# Patient Record
Sex: MALE | Race: WHITE | NOT HISPANIC OR LATINO | Employment: OTHER | URBAN - NONMETROPOLITAN AREA
[De-identification: names, ages, dates, MRNs, and addresses within clinical notes are randomized per-mention and may not be internally consistent; named-entity substitution may affect disease eponyms.]

---

## 2017-02-07 ENCOUNTER — LAB (OUTPATIENT)
Dept: CARDIOLOGY | Facility: CLINIC | Age: 67
End: 2017-02-07

## 2017-02-07 DIAGNOSIS — M1A.0710 IDIOPATHIC CHRONIC GOUT OF RIGHT FOOT WITHOUT TOPHUS: ICD-10-CM

## 2017-02-07 DIAGNOSIS — R73.03 BORDERLINE DIABETES MELLITUS: ICD-10-CM

## 2017-02-07 DIAGNOSIS — E78.2 MIXED HYPERLIPIDEMIA: ICD-10-CM

## 2017-02-07 LAB
ALBUMIN SERPL-MCNC: 4.4 G/DL (ref 3.4–4.8)
ALBUMIN UR-MCNC: 31.7 MG/L
ALBUMIN/GLOB SERPL: 1.4 G/DL (ref 1.5–2.5)
ALP SERPL-CCNC: 60 U/L (ref 46–116)
ALT SERPL W P-5'-P-CCNC: 23 U/L (ref 10–44)
ANION GAP SERPL CALCULATED.3IONS-SCNC: 6.9 MMOL/L (ref 3.6–11.2)
AST SERPL-CCNC: 27 U/L (ref 10–34)
BASOPHILS # BLD AUTO: 0.03 10*3/MM3 (ref 0–0.3)
BASOPHILS NFR BLD AUTO: 0.5 % (ref 0–2)
BILIRUB SERPL-MCNC: 0.7 MG/DL (ref 0.2–1.8)
BUN BLD-MCNC: 23 MG/DL (ref 7–21)
BUN/CREAT SERPL: 14.7 (ref 7–25)
CALCIUM SPEC-SCNC: 9.8 MG/DL (ref 7.7–10)
CHLORIDE SERPL-SCNC: 108 MMOL/L (ref 99–112)
CHOLEST SERPL-MCNC: 155 MG/DL (ref 0–200)
CK SERPL-CCNC: 60 U/L (ref 24–204)
CO2 SERPL-SCNC: 28.1 MMOL/L (ref 24.3–31.9)
CREAT BLD-MCNC: 1.56 MG/DL (ref 0.43–1.29)
DEPRECATED RDW RBC AUTO: 42.7 FL (ref 37–54)
EOSINOPHIL # BLD AUTO: 0.13 10*3/MM3 (ref 0–0.7)
EOSINOPHIL NFR BLD AUTO: 2.3 % (ref 0–7)
ERYTHROCYTE [DISTWIDTH] IN BLOOD BY AUTOMATED COUNT: 13.3 % (ref 11.5–14.5)
GFR SERPL CREATININE-BSD FRML MDRD: 44 ML/MIN/1.73
GLOBULIN UR ELPH-MCNC: 3.1 GM/DL
GLUCOSE BLD-MCNC: 125 MG/DL (ref 70–110)
HBA1C MFR BLD: 7.7 % (ref 4.5–5.7)
HCT VFR BLD AUTO: 45.1 % (ref 42–52)
HDLC SERPL-MCNC: 42 MG/DL (ref 60–100)
HGB BLD-MCNC: 15.1 G/DL (ref 14–18)
IMM GRANULOCYTES # BLD: 0.22 10*3/MM3 (ref 0–0.03)
IMM GRANULOCYTES NFR BLD: 3.9 % (ref 0–0.5)
LDLC SERPL CALC-MCNC: 73 MG/DL (ref 0–100)
LDLC/HDLC SERPL: 1.73 {RATIO}
LYMPHOCYTES # BLD AUTO: 1.75 10*3/MM3 (ref 1–3)
LYMPHOCYTES NFR BLD AUTO: 31.3 % (ref 16–46)
MCH RBC QN AUTO: 29.8 PG (ref 27–33)
MCHC RBC AUTO-ENTMCNC: 33.5 G/DL (ref 33–37)
MCV RBC AUTO: 89 FL (ref 80–94)
MONOCYTES # BLD AUTO: 0.81 10*3/MM3 (ref 0.1–0.9)
MONOCYTES NFR BLD AUTO: 14.5 % (ref 0–12)
NEUTROPHILS # BLD AUTO: 2.65 10*3/MM3 (ref 1.4–6.5)
NEUTROPHILS NFR BLD AUTO: 47.5 % (ref 40–75)
OSMOLALITY SERPL CALC.SUM OF ELEC: 290.1 MOSM/KG (ref 273–305)
PLATELET # BLD AUTO: 166 10*3/MM3 (ref 130–400)
PMV BLD AUTO: 10.4 FL (ref 6–10)
POTASSIUM BLD-SCNC: 5.1 MMOL/L (ref 3.5–5.3)
PROT SERPL-MCNC: 7.5 G/DL (ref 6–8)
RBC # BLD AUTO: 5.07 10*6/MM3 (ref 4.7–6.1)
SODIUM BLD-SCNC: 143 MMOL/L (ref 135–153)
TRIGL SERPL-MCNC: 201 MG/DL (ref 0–150)
URATE SERPL-MCNC: 8.1 MG/DL (ref 3.7–7)
VLDLC SERPL-MCNC: 40.2 MG/DL
WBC NRBC COR # BLD: 5.59 10*3/MM3 (ref 4.5–12.5)

## 2017-02-07 PROCEDURE — 83036 HEMOGLOBIN GLYCOSYLATED A1C: CPT | Performed by: INTERNAL MEDICINE

## 2017-02-07 PROCEDURE — 82043 UR ALBUMIN QUANTITATIVE: CPT | Performed by: INTERNAL MEDICINE

## 2017-02-07 PROCEDURE — 84550 ASSAY OF BLOOD/URIC ACID: CPT | Performed by: INTERNAL MEDICINE

## 2017-02-07 PROCEDURE — 82550 ASSAY OF CK (CPK): CPT | Performed by: INTERNAL MEDICINE

## 2017-02-07 PROCEDURE — 80061 LIPID PANEL: CPT | Performed by: INTERNAL MEDICINE

## 2017-02-07 PROCEDURE — 80053 COMPREHEN METABOLIC PANEL: CPT | Performed by: INTERNAL MEDICINE

## 2017-02-07 PROCEDURE — 85025 COMPLETE CBC W/AUTO DIFF WBC: CPT | Performed by: INTERNAL MEDICINE

## 2017-02-09 ENCOUNTER — OFFICE VISIT (OUTPATIENT)
Dept: CARDIOLOGY | Facility: CLINIC | Age: 67
End: 2017-02-09

## 2017-02-09 VITALS
OXYGEN SATURATION: 96 % | WEIGHT: 221.8 LBS | SYSTOLIC BLOOD PRESSURE: 128 MMHG | BODY MASS INDEX: 30.04 KG/M2 | DIASTOLIC BLOOD PRESSURE: 78 MMHG | HEIGHT: 72 IN | HEART RATE: 60 BPM

## 2017-02-09 DIAGNOSIS — F51.01 PRIMARY INSOMNIA: ICD-10-CM

## 2017-02-09 DIAGNOSIS — J44.9 CHRONIC OBSTRUCTIVE PULMONARY DISEASE, UNSPECIFIED COPD TYPE (HCC): ICD-10-CM

## 2017-02-09 DIAGNOSIS — F41.9 ANXIETY AND DEPRESSION: ICD-10-CM

## 2017-02-09 DIAGNOSIS — R73.03 BORDERLINE DIABETES MELLITUS: ICD-10-CM

## 2017-02-09 DIAGNOSIS — F32.A ANXIETY AND DEPRESSION: ICD-10-CM

## 2017-02-09 DIAGNOSIS — K21.9 GASTROESOPHAGEAL REFLUX DISEASE WITHOUT ESOPHAGITIS: ICD-10-CM

## 2017-02-09 DIAGNOSIS — M1A.0710 IDIOPATHIC CHRONIC GOUT OF RIGHT FOOT WITHOUT TOPHUS: ICD-10-CM

## 2017-02-09 DIAGNOSIS — I10 ESSENTIAL HYPERTENSION: ICD-10-CM

## 2017-02-09 DIAGNOSIS — N18.2 CHRONIC RENAL FAILURE, STAGE 2 (MILD): ICD-10-CM

## 2017-02-09 DIAGNOSIS — E78.2 MIXED HYPERLIPIDEMIA: Primary | ICD-10-CM

## 2017-02-09 PROCEDURE — 99214 OFFICE O/P EST MOD 30 MIN: CPT | Performed by: INTERNAL MEDICINE

## 2017-02-09 RX ORDER — RANITIDINE 150 MG/1
150 TABLET ORAL NIGHTLY
COMMUNITY
End: 2017-10-23 | Stop reason: HOSPADM

## 2017-02-09 NOTE — PROGRESS NOTES
subjective     Chief Complaint   Patient presents with   • Hypertension   • Hyperlipidemia   • Diabetes Mellitus     History of Present Illness    HYPERTENSION  Todd Phillips has long-standing essential hypertension. he is taking medications regularly. There are no medication side effects. Blood pressure is very well controlled. There has been no headache nausea chest pain. There has been no syncopal or presyncopal episode. he denies episodes of hypo-tension or accelerated hypertension.       Hyperlipidemia  Todd Phillips has long-standing history of hyperlipidemia. Has been trying to lose weight and trying to follow diet and activity recommandations. Patient is tolerating medications very well. There has been no side effects. Latest lipid levels have been fluctuating.     Diabetes Mellitus   Todd Phillips has type 2 diabetes mellitus. Taking oral hypoglycemic agents. Tolerating medications very well. he sugar is fluctuating. he is trying to diet and lose weight and exercise. There are no diabetic complications.     Patient has not had any chest pain lately. He had mildly positive stress test in the past however coronary angiography was not done because of renal failure. He currently is asymptomatic and has no chest      Renal failure  Stable. Creatinine around 1.49.  Patient is avoiding NSAID therapy and is drinking more fluids    Anxiety  Significantly better and patient is sleeping better.    Gout  Patient has not had any gouty attack in quite some time.  He is taking Zyloprim for hyperuricemia but only 100 mg daily because of her renal failure.     Patient Active Problem List   Diagnosis   • Palpitations   • Chest pain, with typical and atypical features   • Hyperlipidemia   • COPD (chronic obstructive pulmonary disease)   • Essential hypertension   • Chronic renal failure, stage 2 (mild)   • Borderline diabetes mellitus   • Gout without tophus   • Anxiety and depression   • Primary insomnia   • Gastroesophageal  reflux disease without esophagitis       Social History   Substance Use Topics   • Smoking status: Never Smoker   • Smokeless tobacco: Never Used   • Alcohol use No      Comment: s/p 1975       Allergies   Allergen Reactions   • Morphine And Related          Current Outpatient Prescriptions:   •  albuterol (PROVENTIL HFA;VENTOLIN HFA) 108 (90 BASE) MCG/ACT inhaler, Inhale 2 puffs. EVERY 4-6 HOURS, Disp: , Rfl:   •  aspirin 81 MG tablet, Take 81 mg by mouth daily., Disp: , Rfl:   •  atenolol (TENORMIN) 25 MG tablet, Take 25 mg by mouth daily., Disp: , Rfl:   •  atorvastatin (LIPITOR) 10 MG tablet, Take 5 mg by mouth daily., Disp: , Rfl:   •  colchicine 0.6 MG tablet, Take 1 tablet by mouth daily. (Patient taking differently: Take 0.6 mg by mouth Daily As Needed.), Disp: 30 tablet, Rfl: 2  •  FLUoxetine (PROzac) 20 MG capsule, Take 20 mg by mouth 3 (three) times a day., Disp: , Rfl:   •  mometasone (ASMANEX) 220 MCG/INH inhaler, Inhale 2 puffs 1 (one) time daily., Disp: , Rfl:   •  montelukast (SINGULAIR) 10 MG tablet, Take 10 mg by mouth daily., Disp: , Rfl:   •  nitroglycerin (NITROSTAT) 0.4 MG SL tablet, Place 0.4 mg under the tongue as needed for chest pain., Disp: , Rfl:   •  pantoprazole (PROTONIX) 40 MG EC tablet, Take 40 mg by mouth 2 (two) times a day., Disp: , Rfl:   •  polyethylene glycol (MIRALAX) packet, Take 17 g by mouth daily., Disp: , Rfl:   •  raNITIdine (ZANTAC) 150 MG tablet, Take 150 mg by mouth Every Night., Disp: , Rfl:   •  ranolazine (RANEXA) 500 MG 12 hr tablet, Take 1 tablet by mouth 2 (Two) Times a Day., Disp: 180 tablet, Rfl: 2  •  Tiotropium Bromide-Olodaterol 2.5-2.5 MCG/ACT aerosol solution, Inhale 2 puffs Daily., Disp: , Rfl:   •  Zolpidem Tartrate 10 MG sublingual tablet, Place  under the tongue every night., Disp: , Rfl:       The following portions of the patient's history were reviewed and updated as appropriate: allergies, current medications, past family history, past medical  "history, past social history, past surgical history and problem list.    Review of Systems   Constitution: Negative.   HENT: Negative.    Eyes: Negative.    Cardiovascular: Negative.    Respiratory: Negative.    Hematologic/Lymphatic: Negative.    Musculoskeletal: Negative.    Gastrointestinal: Negative.    Neurological: Negative.           Objective:     Visit Vitals   • /78 (BP Location: Left arm, Patient Position: Sitting)   • Pulse 60   • Ht 72\" (182.9 cm)   • Wt 221 lb 12.8 oz (101 kg)   • SpO2 96%   • BMI 30.08 kg/m2     Physical Exam   Constitutional: He appears well-developed and well-nourished.   HENT:   Head: Normocephalic and atraumatic.   Mouth/Throat: Oropharynx is clear and moist.   Eyes: Conjunctivae and EOM are normal. Pupils are equal, round, and reactive to light. No scleral icterus.   Neck: Normal range of motion. Neck supple. No JVD present. No tracheal deviation present. No thyromegaly present.   Cardiovascular: Normal rate, regular rhythm, normal heart sounds and intact distal pulses.  Exam reveals no friction rub.    No murmur heard.  Pulmonary/Chest: Effort normal and breath sounds normal. No respiratory distress. He has no wheezes. He has no rales. He exhibits no tenderness.   Abdominal: Soft. Bowel sounds are normal. He exhibits no distension and no mass. There is no tenderness. There is no rebound and no guarding.   Musculoskeletal: Normal range of motion. He exhibits no edema, tenderness or deformity.   Lymphadenopathy:     He has no cervical adenopathy.   Neurological: He is alert. He has normal reflexes. No cranial nerve deficit. He exhibits normal muscle tone. Coordination normal.   Skin: Skin is warm and dry.   Psychiatric: He has a normal mood and affect. His behavior is normal. Judgment and thought content normal.         Lab Review  Lab Results   Component Value Date     02/07/2017    K 5.1 02/07/2017     02/07/2017    BUN 23 (H) 02/07/2017    CREATININE 1.56 (H) " 02/07/2017    GLUCOSE 125 (H) 02/07/2017    CALCIUM 9.8 02/07/2017    ALT 23 02/07/2017    ALKPHOS 60 02/07/2017    LABIL2 1.4 (L) 02/07/2017     Lab Results   Component Value Date    CKTOTAL 60 02/07/2017     Lab Results   Component Value Date    WBC 5.59 02/07/2017    HGB 15.1 02/07/2017    HCT 45.1 02/07/2017     02/07/2017     Lab Results   Component Value Date    INR <0.90 08/30/2016     No results found for: MG  Lab Results   Component Value Date    PSA 1.66 03/25/2016    TSH 1.494 10/07/2015     No results found for: BNP  Lab Results   Component Value Date    CHLPL 144 03/25/2016     Lab Results   Component Value Date    CHOL 155 02/07/2017    TRIG 201 (H) 02/07/2017    HDL 42 (L) 02/07/2017    LDLCALC 73 02/07/2017    VLDL 40.2 02/07/2017    LDLHDL 1.73 02/07/2017         Procedures     I personally viewed and interpreted the patient's LAB data         Assessment:     1. Mixed hyperlipidemia    2. Essential hypertension    3. Chronic obstructive pulmonary disease, unspecified COPD type    4. Chronic renal failure, stage 2 (mild)    5. Borderline diabetes mellitus    6. Idiopathic chronic gout of right foot without tophus    7. Gastroesophageal reflux disease without esophagitis    8. Primary insomnia    9. Anxiety and depression          Plan:       Patient is doing very well.  He has not had any chest pains.  Lipids are abnormal with hypertriglyceridemia however her cholesterol is good with LDL of 73.  Weight loss and risk factor modification again discussed with the patient    Blood pressure is very nicely controlled with current medications    She is breathing better using Singulair and DuoNeb and albuterol HFA.  Along with Asmanex  GI symptoms are controlled.  Renal failure is stable patient was advised to drink more fluids and avoid NSAID therapy.    Refills of medications were given follow-up scheduled.          Return in about 3 months (around 5/9/2017).

## 2017-02-13 ENCOUNTER — LAB (OUTPATIENT)
Dept: LAB | Facility: HOSPITAL | Age: 67
End: 2017-02-13
Attending: INTERNAL MEDICINE

## 2017-02-13 ENCOUNTER — TRANSCRIBE ORDERS (OUTPATIENT)
Dept: ADMINISTRATIVE | Facility: HOSPITAL | Age: 67
End: 2017-02-13

## 2017-02-13 DIAGNOSIS — N18.30 CHRONIC KIDNEY DISEASE, STAGE III (MODERATE) (HCC): ICD-10-CM

## 2017-02-13 DIAGNOSIS — N18.30 CHRONIC KIDNEY DISEASE, STAGE III (MODERATE) (HCC): Primary | ICD-10-CM

## 2017-02-13 LAB
ALBUMIN SERPL-MCNC: 4.3 G/DL (ref 3.4–4.8)
ALBUMIN UR-MCNC: 3.2 MG/L
ALBUMIN/GLOB SERPL: 1.5 G/DL (ref 1.5–2.5)
ALP SERPL-CCNC: 69 U/L (ref 46–116)
ALT SERPL W P-5'-P-CCNC: 20 U/L (ref 10–44)
ANION GAP SERPL CALCULATED.3IONS-SCNC: 5.8 MMOL/L (ref 3.6–11.2)
AST SERPL-CCNC: 22 U/L (ref 10–34)
BILIRUB SERPL-MCNC: 0.7 MG/DL (ref 0.2–1.8)
BILIRUB UR QL STRIP: NEGATIVE
BUN BLD-MCNC: 22 MG/DL (ref 7–21)
BUN/CREAT SERPL: 15.1 (ref 7–25)
CALCIUM SPEC-SCNC: 9.5 MG/DL (ref 7.7–10)
CHLORIDE SERPL-SCNC: 105 MMOL/L (ref 99–112)
CLARITY UR: CLEAR
CO2 SERPL-SCNC: 30.2 MMOL/L (ref 24.3–31.9)
COLOR UR: YELLOW
CREAT BLD-MCNC: 1.46 MG/DL (ref 0.43–1.29)
CREAT UR-MCNC: 163.1 MG/DL
GFR SERPL CREATININE-BSD FRML MDRD: 48 ML/MIN/1.73
GLOBULIN UR ELPH-MCNC: 2.9 GM/DL
GLUCOSE BLD-MCNC: 112 MG/DL (ref 70–110)
GLUCOSE UR STRIP-MCNC: NEGATIVE MG/DL
HGB UR QL STRIP.AUTO: NEGATIVE
KETONES UR QL STRIP: NEGATIVE
LEUKOCYTE ESTERASE UR QL STRIP.AUTO: NEGATIVE
MICROALBUMIN/CREAT UR: 2 MG/G
NITRITE UR QL STRIP: NEGATIVE
OSMOLALITY SERPL CALC.SUM OF ELEC: 285.3 MOSM/KG (ref 273–305)
PH UR STRIP.AUTO: <=5 [PH] (ref 5–8)
POTASSIUM BLD-SCNC: 4.6 MMOL/L (ref 3.5–5.3)
PROT SERPL-MCNC: 7.2 G/DL (ref 6–8)
PROT UR QL STRIP: NEGATIVE
SODIUM BLD-SCNC: 141 MMOL/L (ref 135–153)
SP GR UR STRIP: 1.02 (ref 1–1.03)
UROBILINOGEN UR QL STRIP: NORMAL

## 2017-02-13 PROCEDURE — 36415 COLL VENOUS BLD VENIPUNCTURE: CPT

## 2017-02-13 PROCEDURE — 87205 SMEAR GRAM STAIN: CPT | Performed by: INTERNAL MEDICINE

## 2017-02-13 PROCEDURE — 81003 URINALYSIS AUTO W/O SCOPE: CPT | Performed by: INTERNAL MEDICINE

## 2017-02-13 PROCEDURE — 82043 UR ALBUMIN QUANTITATIVE: CPT | Performed by: INTERNAL MEDICINE

## 2017-02-13 PROCEDURE — 80053 COMPREHEN METABOLIC PANEL: CPT | Performed by: INTERNAL MEDICINE

## 2017-02-13 PROCEDURE — 82570 ASSAY OF URINE CREATININE: CPT | Performed by: INTERNAL MEDICINE

## 2017-02-14 LAB — EOSINOPHIL SPEC QL WRIGHT STN: NORMAL %

## 2017-04-26 ENCOUNTER — OFFICE VISIT (OUTPATIENT)
Dept: CARDIOLOGY | Facility: CLINIC | Age: 67
End: 2017-04-26

## 2017-04-26 VITALS
SYSTOLIC BLOOD PRESSURE: 131 MMHG | BODY MASS INDEX: 30.34 KG/M2 | HEIGHT: 72 IN | OXYGEN SATURATION: 97 % | DIASTOLIC BLOOD PRESSURE: 75 MMHG | HEART RATE: 61 BPM | WEIGHT: 224 LBS

## 2017-04-26 DIAGNOSIS — F51.01 PRIMARY INSOMNIA: ICD-10-CM

## 2017-04-26 DIAGNOSIS — N18.2 CHRONIC RENAL FAILURE, STAGE 2 (MILD): ICD-10-CM

## 2017-04-26 DIAGNOSIS — E78.2 MIXED HYPERLIPIDEMIA: ICD-10-CM

## 2017-04-26 DIAGNOSIS — I10 ESSENTIAL HYPERTENSION: Primary | ICD-10-CM

## 2017-04-26 DIAGNOSIS — F32.A ANXIETY AND DEPRESSION: ICD-10-CM

## 2017-04-26 DIAGNOSIS — R59.0 LYMPHADENOPATHY, SUBMANDIBULAR: ICD-10-CM

## 2017-04-26 DIAGNOSIS — Z84.2 FAMILY HISTORY OF PROSTATE PROBLEMS: ICD-10-CM

## 2017-04-26 DIAGNOSIS — J44.9 CHRONIC OBSTRUCTIVE PULMONARY DISEASE, UNSPECIFIED COPD TYPE (HCC): ICD-10-CM

## 2017-04-26 DIAGNOSIS — R73.03 BORDERLINE DIABETES MELLITUS: ICD-10-CM

## 2017-04-26 DIAGNOSIS — R00.2 PALPITATIONS: ICD-10-CM

## 2017-04-26 DIAGNOSIS — F41.9 ANXIETY AND DEPRESSION: ICD-10-CM

## 2017-04-26 PROCEDURE — 99214 OFFICE O/P EST MOD 30 MIN: CPT | Performed by: INTERNAL MEDICINE

## 2017-04-26 RX ORDER — COLCHICINE 0.6 MG/1
0.6 TABLET ORAL DAILY
Qty: 30 TABLET | Refills: 2 | Status: SHIPPED | OUTPATIENT
Start: 2017-04-26 | End: 2017-07-26 | Stop reason: SDUPTHER

## 2017-04-26 RX ORDER — AMOXICILLIN 500 MG/1
500 CAPSULE ORAL 3 TIMES DAILY
Qty: 30 CAPSULE | Refills: 0 | Status: SHIPPED | OUTPATIENT
Start: 2017-04-26 | End: 2017-06-20

## 2017-04-29 PROBLEM — R59.0 LYMPHADENOPATHY, SUBMANDIBULAR: Status: ACTIVE | Noted: 2017-04-29

## 2017-06-20 ENCOUNTER — APPOINTMENT (OUTPATIENT)
Dept: CT IMAGING | Facility: HOSPITAL | Age: 67
End: 2017-06-20

## 2017-06-20 ENCOUNTER — HOSPITAL ENCOUNTER (EMERGENCY)
Facility: HOSPITAL | Age: 67
Discharge: HOME | End: 2017-06-20
Attending: FAMILY MEDICINE | Admitting: FAMILY MEDICINE

## 2017-06-20 ENCOUNTER — APPOINTMENT (OUTPATIENT)
Dept: GENERAL RADIOLOGY | Facility: HOSPITAL | Age: 67
End: 2017-06-20

## 2017-06-20 VITALS
OXYGEN SATURATION: 98 % | RESPIRATION RATE: 18 BRPM | TEMPERATURE: 98 F | SYSTOLIC BLOOD PRESSURE: 133 MMHG | WEIGHT: 225 LBS | HEIGHT: 72 IN | HEART RATE: 68 BPM | DIASTOLIC BLOOD PRESSURE: 80 MMHG | BODY MASS INDEX: 30.48 KG/M2

## 2017-06-20 DIAGNOSIS — S09.90XA HEAD INJURY, INITIAL ENCOUNTER: Primary | ICD-10-CM

## 2017-06-20 DIAGNOSIS — S16.1XXA CERVICAL STRAIN, INITIAL ENCOUNTER: ICD-10-CM

## 2017-06-20 DIAGNOSIS — S01.01XA SCALP LACERATION, INITIAL ENCOUNTER: ICD-10-CM

## 2017-06-20 PROCEDURE — 72125 CT NECK SPINE W/O DYE: CPT | Performed by: RADIOLOGY

## 2017-06-20 PROCEDURE — 96375 TX/PRO/DX INJ NEW DRUG ADDON: CPT

## 2017-06-20 PROCEDURE — 25010000002 HYDROMORPHONE PER 4 MG: Performed by: FAMILY MEDICINE

## 2017-06-20 PROCEDURE — 96374 THER/PROPH/DIAG INJ IV PUSH: CPT

## 2017-06-20 PROCEDURE — 72125 CT NECK SPINE W/O DYE: CPT

## 2017-06-20 PROCEDURE — 25010000002 TDAP 5-2.5-18.5 LF-MCG/0.5 SUSPENSION: Performed by: PHYSICIAN ASSISTANT

## 2017-06-20 PROCEDURE — 90715 TDAP VACCINE 7 YRS/> IM: CPT | Performed by: PHYSICIAN ASSISTANT

## 2017-06-20 PROCEDURE — 70450 CT HEAD/BRAIN W/O DYE: CPT | Performed by: RADIOLOGY

## 2017-06-20 PROCEDURE — 72128 CT CHEST SPINE W/O DYE: CPT | Performed by: RADIOLOGY

## 2017-06-20 PROCEDURE — 71010 HC CHEST PA OR AP: CPT

## 2017-06-20 PROCEDURE — 71010 XR CHEST 1 VW: CPT | Performed by: RADIOLOGY

## 2017-06-20 PROCEDURE — 72131 CT LUMBAR SPINE W/O DYE: CPT

## 2017-06-20 PROCEDURE — 99284 EMERGENCY DEPT VISIT MOD MDM: CPT

## 2017-06-20 PROCEDURE — 90471 IMMUNIZATION ADMIN: CPT | Performed by: PHYSICIAN ASSISTANT

## 2017-06-20 PROCEDURE — 72131 CT LUMBAR SPINE W/O DYE: CPT | Performed by: RADIOLOGY

## 2017-06-20 PROCEDURE — 70450 CT HEAD/BRAIN W/O DYE: CPT

## 2017-06-20 PROCEDURE — 25010000002 ONDANSETRON PER 1 MG: Performed by: FAMILY MEDICINE

## 2017-06-20 PROCEDURE — 72128 CT CHEST SPINE W/O DYE: CPT

## 2017-06-20 RX ORDER — ONDANSETRON 4 MG/1
4 TABLET, ORALLY DISINTEGRATING ORAL ONCE
Status: COMPLETED | OUTPATIENT
Start: 2017-06-20 | End: 2017-06-20

## 2017-06-20 RX ORDER — SODIUM CHLORIDE 0.9 % (FLUSH) 0.9 %
10 SYRINGE (ML) INJECTION AS NEEDED
Status: DISCONTINUED | OUTPATIENT
Start: 2017-06-20 | End: 2017-06-20 | Stop reason: HOSPADM

## 2017-06-20 RX ORDER — MAGNESIUM HYDROXIDE 1200 MG/15ML
1000 LIQUID ORAL ONCE
Status: COMPLETED | OUTPATIENT
Start: 2017-06-20 | End: 2017-06-20

## 2017-06-20 RX ORDER — HYDROCODONE BITARTRATE AND ACETAMINOPHEN 5; 325 MG/1; MG/1
1 TABLET ORAL EVERY 6 HOURS PRN
Qty: 10 TABLET | Refills: 0 | Status: SHIPPED | OUTPATIENT
Start: 2017-06-20 | End: 2017-10-25

## 2017-06-20 RX ORDER — HYDROMORPHONE HYDROCHLORIDE 1 MG/ML
0.5 INJECTION, SOLUTION INTRAMUSCULAR; INTRAVENOUS; SUBCUTANEOUS ONCE
Status: COMPLETED | OUTPATIENT
Start: 2017-06-20 | End: 2017-06-20

## 2017-06-20 RX ORDER — MAGNESIUM HYDROXIDE 1200 MG/15ML
LIQUID ORAL
Status: COMPLETED
Start: 2017-06-20 | End: 2017-06-20

## 2017-06-20 RX ORDER — BACITRACIN, NEOMYCIN, POLYMYXIN B 400; 3.5; 5 [USP'U]/G; MG/G; [USP'U]/G
OINTMENT TOPICAL ONCE
Status: COMPLETED | OUTPATIENT
Start: 2017-06-20 | End: 2017-06-20

## 2017-06-20 RX ORDER — ONDANSETRON 2 MG/ML
4 INJECTION INTRAMUSCULAR; INTRAVENOUS ONCE
Status: COMPLETED | OUTPATIENT
Start: 2017-06-20 | End: 2017-06-20

## 2017-06-20 RX ADMIN — ONDANSETRON 4 MG: 4 TABLET, ORALLY DISINTEGRATING ORAL at 10:55

## 2017-06-20 RX ADMIN — BACITRACIN, NEOMYCIN, POLYMYXIN B 1 APPLICATION: 400; 3.5; 5 OINTMENT TOPICAL at 14:10

## 2017-06-20 RX ADMIN — HYDROMORPHONE HYDROCHLORIDE 0.5 MG: 1 INJECTION, SOLUTION INTRAMUSCULAR; INTRAVENOUS; SUBCUTANEOUS at 11:47

## 2017-06-20 RX ADMIN — TETANUS TOXOID, REDUCED DIPHTHERIA TOXOID AND ACELLULAR PERTUSSIS VACCINE, ADSORBED 0.5 ML: 5; 2.5; 8; 8; 2.5 SUSPENSION INTRAMUSCULAR at 10:53

## 2017-06-20 RX ADMIN — MAGNESIUM HYDROXIDE 1000 ML: 1200 LIQUID ORAL at 13:25

## 2017-06-20 RX ADMIN — SODIUM CHLORIDE 1000 ML: 900 IRRIGANT IRRIGATION at 13:25

## 2017-06-20 RX ADMIN — Medication 10 ML: at 11:46

## 2017-06-20 RX ADMIN — ONDANSETRON 4 MG: 2 INJECTION, SOLUTION INTRAMUSCULAR; INTRAVENOUS at 11:45

## 2017-06-20 NOTE — DISCHARGE INSTRUCTIONS

## 2017-06-20 NOTE — ED PROVIDER NOTES
Subjective   Patient is a 67 y.o. male presenting with fall.   Fall   Mechanism of injury: fall    Injury location:  Head/neck  Head/neck injury location:  Head and scalp  Time since incident:  2 hours  Arrived directly from scene: yes    Fall:     Fall occurred:  From a roof    Height of fall:  12 feet    Impact surface:  Dirt    Point of impact:  Head    Entrapped after fall: no    Protective equipment: none    Suspicion of alcohol use: no    Suspicion of drug use: no    Tetanus status:  Out of date  Prior to arrival data:     Bystander interventions:  Bystander C-spine precautions    Responsiveness at scene:  Alert    Orientation at scene:  Person, place, situation and time    Loss of consciousness: no      Amnesic to event: no      Airway interventions:  None    Cardiac interventions:  None    Immobilization:  Long board and C-collar  Associated symptoms: difficulty breathing (in the beginning though feeling better.) and nausea (attributes this to being carsick.)    Associated symptoms: no abdominal pain, no back pain, no blindness, no chest pain, no loss of consciousness, no neck pain and no vomiting        Review of Systems   Constitutional: Negative.  Negative for fever.   HENT: Negative.    Eyes: Negative for blindness.   Respiratory: Negative.    Cardiovascular: Negative.  Negative for chest pain.   Gastrointestinal: Positive for nausea (attributes this to being carsick.). Negative for abdominal pain and vomiting.   Endocrine: Negative.    Genitourinary: Negative.  Negative for dysuria.   Musculoskeletal: Negative for back pain and neck pain.   Skin: Negative.    Neurological: Negative.  Negative for loss of consciousness.   Psychiatric/Behavioral: Negative.    All other systems reviewed and are negative.      Past Medical History:   Diagnosis Date   • Antral gastritis    • Borderline diabetes mellitus    • Chest pain    • COPD (chronic obstructive pulmonary disease)    • Duodenitis    • Emphysema of lung     • History of EKG 03/28/2016    NORMAL   • Hyperlipidemia    • Hypertension    • Obesity    • Palpitations    • Reflux esophagitis    • Renal failure     MILD       Allergies   Allergen Reactions   • Morphine And Related        Past Surgical History:   Procedure Laterality Date   • CARDIAC CATHETERIZATION  11/01/1999   • KIDNEY STONE SURGERY     • UPPER GASTROINTESTINAL ENDOSCOPY  08/30/2012       Family History   Problem Relation Age of Onset   • Heart attack Mother    • Heart disease Mother    • Stroke Mother    • Kidney disease Mother    • Heart failure Mother    • Lung disease Father    • Tuberculosis Father    • Diabetes Sister    • Heart attack Brother    • Heart failure Brother    • Heart disease Brother    • Diabetes Sister    • No Known Problems Brother    • No Known Problems Brother        Social History     Social History   • Marital status:      Spouse name: N/A   • Number of children: N/A   • Years of education: N/A     Social History Main Topics   • Smoking status: Never Smoker   • Smokeless tobacco: Never Used   • Alcohol use No      Comment: s/p 1975   • Drug use: No   • Sexual activity: Not Asked     Other Topics Concern   • None     Social History Narrative           Objective   Physical Exam   Constitutional: He is oriented to person, place, and time. He appears well-developed and well-nourished. No distress.   HENT:   Head: Normocephalic and atraumatic.   Right Ear: External ear normal.   Left Ear: External ear normal.   Nose: Nose normal.   Eyes: Conjunctivae and EOM are normal. Pupils are equal, round, and reactive to light.   Neck: Normal range of motion. Neck supple. No JVD present. No tracheal deviation present.   Cardiovascular: Normal rate, regular rhythm and normal heart sounds.    No murmur heard.  Pulmonary/Chest: Effort normal and breath sounds normal. No respiratory distress. He has no wheezes.   Abdominal: Soft. Bowel sounds are normal. There is no tenderness.    Musculoskeletal: Normal range of motion. He exhibits no edema or deformity.   Neurological: He is alert and oriented to person, place, and time. No cranial nerve deficit.   Skin: Skin is warm and dry. No rash noted. He is not diaphoretic. No erythema. No pallor.   Laceration to posterior scalp   Psychiatric: He has a normal mood and affect. His behavior is normal. Thought content normal.   Nursing note and vitals reviewed.      Laceration repair  Date/Time: 6/20/2017 2:43 PM  Performed by: JOON MONTEZ  Authorized by: ROCIO ALEMAN   Body area: head/neck  Location details: scalp  Laceration length: 2.5 cm  Foreign bodies: no foreign bodies  Tendon involvement: none  Nerve involvement: none  Vascular damage: no  Anesthesia: local infiltration    Anesthesia:  Anesthesia: local infiltration  Local Anesthetic: lidocaine 1% with epinephrine   Sedation:  Patient sedated: no    Preparation: Patient was prepped and draped in the usual sterile fashion.  Irrigation solution: saline  Amount of cleaning: standard  Debridement: none  Degree of undermining: none  Skin closure: staples  Number of sutures: 5  Technique: simple  Approximation: close  Approximation difficulty: simple  Dressing: 4x4 sterile gauze and antibiotic ointment  Patient tolerance: Patient tolerated the procedure well with no immediate complications               ED Course  ED Course   Comment By Time   Declines anything for pain at this time.  States he just needs something for nausea NORBERT Garvin 06/20 1048                  [facility]  Number of Diagnoses or Management Options  Cervical strain, initial encounter: new and requires workup  Head injury, initial encounter: new and requires workup  Scalp laceration, initial encounter: new and requires workup     Amount and/or Complexity of Data Reviewed  Tests in the radiology section of CPT®: ordered and reviewed  Discuss the patient with other providers: yes  Independent visualization of images,  tracings, or specimens: yes    Risk of Complications, Morbidity, and/or Mortality  Presenting problems: moderate        Final diagnoses:   Head injury, initial encounter   Scalp laceration, initial encounter   Cervical strain, initial encounter            NORBERT Garvin  06/20/17 1444       NORBERT Garvin  06/20/17 1447

## 2017-06-23 ENCOUNTER — LAB (OUTPATIENT)
Dept: LAB | Facility: HOSPITAL | Age: 67
End: 2017-06-23
Attending: INTERNAL MEDICINE

## 2017-06-23 ENCOUNTER — TRANSCRIBE ORDERS (OUTPATIENT)
Dept: LAB | Facility: HOSPITAL | Age: 67
End: 2017-06-23

## 2017-06-23 DIAGNOSIS — N18.30 CHRONIC KIDNEY DISEASE, STAGE III (MODERATE) (HCC): ICD-10-CM

## 2017-06-23 DIAGNOSIS — M10.9 GOUT, UNSPECIFIED: ICD-10-CM

## 2017-06-23 DIAGNOSIS — N18.30 CHRONIC KIDNEY DISEASE, STAGE III (MODERATE) (HCC): Primary | ICD-10-CM

## 2017-06-23 LAB
ALBUMIN SERPL-MCNC: 4.6 G/DL (ref 3.4–4.8)
ALBUMIN UR-MCNC: 4 MG/L
ALBUMIN/GLOB SERPL: 1.5 G/DL (ref 1.5–2.5)
ALP SERPL-CCNC: 66 U/L (ref 40–129)
ALT SERPL W P-5'-P-CCNC: 22 U/L (ref 10–44)
ANION GAP SERPL CALCULATED.3IONS-SCNC: 5.6 MMOL/L (ref 3.6–11.2)
AST SERPL-CCNC: 27 U/L (ref 10–34)
BILIRUB SERPL-MCNC: 0.6 MG/DL (ref 0.2–1.8)
BILIRUB UR QL STRIP: NEGATIVE
BUN BLD-MCNC: 16 MG/DL (ref 7–21)
BUN/CREAT SERPL: 11.9 (ref 7–25)
CALCIUM SPEC-SCNC: 9.9 MG/DL (ref 7.7–10)
CHLORIDE SERPL-SCNC: 106 MMOL/L (ref 99–112)
CLARITY UR: CLEAR
CO2 SERPL-SCNC: 26.4 MMOL/L (ref 24.3–31.9)
COLOR UR: YELLOW
CREAT BLD-MCNC: 1.35 MG/DL (ref 0.43–1.29)
CREAT UR-MCNC: 135.8 MG/DL
GFR SERPL CREATININE-BSD FRML MDRD: 52 ML/MIN/1.73
GLOBULIN UR ELPH-MCNC: 3.1 GM/DL
GLUCOSE BLD-MCNC: 113 MG/DL (ref 70–110)
GLUCOSE UR STRIP-MCNC: NEGATIVE MG/DL
HGB UR QL STRIP.AUTO: NEGATIVE
KETONES UR QL STRIP: NEGATIVE
LEUKOCYTE ESTERASE UR QL STRIP.AUTO: NEGATIVE
MICROALBUMIN/CREAT UR: 2.9 MG/G
NITRITE UR QL STRIP: NEGATIVE
OSMOLALITY SERPL CALC.SUM OF ELEC: 277.7 MOSM/KG (ref 273–305)
PH UR STRIP.AUTO: 5.5 [PH] (ref 5–8)
POTASSIUM BLD-SCNC: 4.7 MMOL/L (ref 3.5–5.3)
PROT SERPL-MCNC: 7.7 G/DL (ref 6–8)
PROT UR QL STRIP: NEGATIVE
SODIUM BLD-SCNC: 138 MMOL/L (ref 135–153)
SP GR UR STRIP: 1.02 (ref 1–1.03)
URATE SERPL-MCNC: 9 MG/DL (ref 3.7–7)
UROBILINOGEN UR QL STRIP: NORMAL

## 2017-06-23 PROCEDURE — 36415 COLL VENOUS BLD VENIPUNCTURE: CPT

## 2017-06-23 PROCEDURE — 87205 SMEAR GRAM STAIN: CPT | Performed by: INTERNAL MEDICINE

## 2017-06-23 PROCEDURE — 84550 ASSAY OF BLOOD/URIC ACID: CPT | Performed by: INTERNAL MEDICINE

## 2017-06-23 PROCEDURE — 81003 URINALYSIS AUTO W/O SCOPE: CPT | Performed by: INTERNAL MEDICINE

## 2017-06-23 PROCEDURE — 82043 UR ALBUMIN QUANTITATIVE: CPT | Performed by: INTERNAL MEDICINE

## 2017-06-23 PROCEDURE — 82570 ASSAY OF URINE CREATININE: CPT | Performed by: INTERNAL MEDICINE

## 2017-06-23 PROCEDURE — 80053 COMPREHEN METABOLIC PANEL: CPT | Performed by: INTERNAL MEDICINE

## 2017-06-24 LAB — EOSINOPHIL SPEC QL WRIGHT STN: NORMAL %

## 2017-07-11 ENCOUNTER — LAB (OUTPATIENT)
Dept: LAB | Facility: HOSPITAL | Age: 67
End: 2017-07-11
Attending: INTERNAL MEDICINE

## 2017-07-11 DIAGNOSIS — Z84.2 FAMILY HISTORY OF PROSTATE PROBLEMS: ICD-10-CM

## 2017-07-11 DIAGNOSIS — E78.2 MIXED HYPERLIPIDEMIA: ICD-10-CM

## 2017-07-11 LAB
ALBUMIN SERPL-MCNC: 4.7 G/DL (ref 3.4–4.8)
ALBUMIN/GLOB SERPL: 1.6 G/DL (ref 1.5–2.5)
ALP SERPL-CCNC: 71 U/L (ref 40–129)
ALT SERPL W P-5'-P-CCNC: 26 U/L (ref 10–44)
ANION GAP SERPL CALCULATED.3IONS-SCNC: 3.8 MMOL/L (ref 3.6–11.2)
AST SERPL-CCNC: 29 U/L (ref 10–34)
BASOPHILS # BLD AUTO: 0.03 10*3/MM3 (ref 0–0.3)
BASOPHILS NFR BLD AUTO: 0.6 % (ref 0–2)
BILIRUB SERPL-MCNC: 0.7 MG/DL (ref 0.2–1.8)
BUN BLD-MCNC: 20 MG/DL (ref 7–21)
BUN/CREAT SERPL: 14 (ref 7–25)
CALCIUM SPEC-SCNC: 10.3 MG/DL (ref 7.7–10)
CHLORIDE SERPL-SCNC: 107 MMOL/L (ref 99–112)
CHOLEST SERPL-MCNC: 188 MG/DL (ref 0–200)
CK SERPL-CCNC: 104 U/L (ref 24–204)
CO2 SERPL-SCNC: 29.2 MMOL/L (ref 24.3–31.9)
CREAT BLD-MCNC: 1.43 MG/DL (ref 0.43–1.29)
DEPRECATED RDW RBC AUTO: 41.8 FL (ref 37–54)
EOSINOPHIL # BLD AUTO: 0.19 10*3/MM3 (ref 0–0.7)
EOSINOPHIL NFR BLD AUTO: 3.9 % (ref 0–7)
ERYTHROCYTE [DISTWIDTH] IN BLOOD BY AUTOMATED COUNT: 13.1 % (ref 11.5–14.5)
GFR SERPL CREATININE-BSD FRML MDRD: 49 ML/MIN/1.73
GLOBULIN UR ELPH-MCNC: 3 GM/DL
GLUCOSE BLD-MCNC: 115 MG/DL (ref 70–110)
HCT VFR BLD AUTO: 45.6 % (ref 42–52)
HDLC SERPL-MCNC: 40 MG/DL (ref 60–100)
HGB BLD-MCNC: 15 G/DL (ref 14–18)
IMM GRANULOCYTES # BLD: 0.03 10*3/MM3 (ref 0–0.03)
IMM GRANULOCYTES NFR BLD: 0.6 % (ref 0–0.5)
LDLC SERPL CALC-MCNC: 95 MG/DL (ref 0–100)
LDLC/HDLC SERPL: 2.38 {RATIO}
LYMPHOCYTES # BLD AUTO: 1.88 10*3/MM3 (ref 1–3)
LYMPHOCYTES NFR BLD AUTO: 38.2 % (ref 16–46)
MCH RBC QN AUTO: 29.2 PG (ref 27–33)
MCHC RBC AUTO-ENTMCNC: 32.9 G/DL (ref 33–37)
MCV RBC AUTO: 88.7 FL (ref 80–94)
MONOCYTES # BLD AUTO: 0.55 10*3/MM3 (ref 0.1–0.9)
MONOCYTES NFR BLD AUTO: 11.2 % (ref 0–12)
NEUTROPHILS # BLD AUTO: 2.24 10*3/MM3 (ref 1.4–6.5)
NEUTROPHILS NFR BLD AUTO: 45.5 % (ref 40–75)
OSMOLALITY SERPL CALC.SUM OF ELEC: 282.9 MOSM/KG (ref 273–305)
PLATELET # BLD AUTO: 159 10*3/MM3 (ref 130–400)
PMV BLD AUTO: 10.2 FL (ref 6–10)
POTASSIUM BLD-SCNC: 5.3 MMOL/L (ref 3.5–5.3)
PROT SERPL-MCNC: 7.7 G/DL (ref 6–8)
PSA SERPL-MCNC: 1.45 NG/ML (ref 0–4)
RBC # BLD AUTO: 5.14 10*6/MM3 (ref 4.7–6.1)
SODIUM BLD-SCNC: 140 MMOL/L (ref 135–153)
TRIGL SERPL-MCNC: 265 MG/DL (ref 0–150)
VLDLC SERPL-MCNC: 53 MG/DL
WBC NRBC COR # BLD: 4.92 10*3/MM3 (ref 4.5–12.5)

## 2017-07-11 PROCEDURE — 82550 ASSAY OF CK (CPK): CPT | Performed by: INTERNAL MEDICINE

## 2017-07-11 PROCEDURE — 85025 COMPLETE CBC W/AUTO DIFF WBC: CPT | Performed by: INTERNAL MEDICINE

## 2017-07-11 PROCEDURE — 80061 LIPID PANEL: CPT | Performed by: INTERNAL MEDICINE

## 2017-07-11 PROCEDURE — 80053 COMPREHEN METABOLIC PANEL: CPT | Performed by: INTERNAL MEDICINE

## 2017-07-11 PROCEDURE — 84153 ASSAY OF PSA TOTAL: CPT | Performed by: INTERNAL MEDICINE

## 2017-07-26 ENCOUNTER — OFFICE VISIT (OUTPATIENT)
Dept: CARDIOLOGY | Facility: CLINIC | Age: 67
End: 2017-07-26

## 2017-07-26 VITALS
DIASTOLIC BLOOD PRESSURE: 98 MMHG | OXYGEN SATURATION: 97 % | WEIGHT: 227.6 LBS | HEIGHT: 72 IN | BODY MASS INDEX: 30.83 KG/M2 | SYSTOLIC BLOOD PRESSURE: 152 MMHG | HEART RATE: 59 BPM

## 2017-07-26 DIAGNOSIS — F32.A ANXIETY AND DEPRESSION: ICD-10-CM

## 2017-07-26 DIAGNOSIS — F51.01 PRIMARY INSOMNIA: ICD-10-CM

## 2017-07-26 DIAGNOSIS — N18.2 CHRONIC RENAL FAILURE, STAGE 2 (MILD): ICD-10-CM

## 2017-07-26 DIAGNOSIS — I10 ESSENTIAL HYPERTENSION: Primary | ICD-10-CM

## 2017-07-26 DIAGNOSIS — J44.9 CHRONIC OBSTRUCTIVE PULMONARY DISEASE, UNSPECIFIED COPD TYPE (HCC): ICD-10-CM

## 2017-07-26 DIAGNOSIS — E78.2 MIXED HYPERLIPIDEMIA: ICD-10-CM

## 2017-07-26 DIAGNOSIS — F41.9 ANXIETY AND DEPRESSION: ICD-10-CM

## 2017-07-26 PROCEDURE — 99214 OFFICE O/P EST MOD 30 MIN: CPT | Performed by: INTERNAL MEDICINE

## 2017-07-26 RX ORDER — TRAZODONE HYDROCHLORIDE 100 MG/1
TABLET ORAL
Refills: 0 | COMMUNITY
Start: 2017-05-22 | End: 2017-10-25

## 2017-07-26 RX ORDER — COLCHICINE 0.6 MG/1
0.6 TABLET ORAL DAILY
Qty: 90 TABLET | Refills: 2 | Status: ON HOLD | OUTPATIENT
Start: 2017-07-26 | End: 2019-04-22

## 2017-07-26 RX ORDER — ZOLPIDEM TARTRATE 5 MG/1
5 TABLET ORAL NIGHTLY PRN
Qty: 30 TABLET | Refills: 3 | Status: SHIPPED | OUTPATIENT
Start: 2017-07-26 | End: 2017-10-23 | Stop reason: HOSPADM

## 2017-07-26 RX ORDER — ALLOPURINOL 100 MG/1
TABLET ORAL
Refills: 0 | COMMUNITY
Start: 2017-07-11 | End: 2017-11-30

## 2017-07-26 NOTE — PROGRESS NOTES
subjective     Chief Complaint   Patient presents with   • Follow-up   • Palpitations   • Hyperlipidemia   • Hypertension   • Back Pain     S/P FALL    • Headache     History of Present Illness    Patient recently fell from the roof changing the roof.  He went to the emergency room required some stitches in the scalp.  It happened on 6/20/2017 which is almost a month ago.  CT scan of the head and neck and back was all normal.  He still complains of some aches and pains.  Range of motion is normal.  No headaches or dizziness.    Insomnia  Patient has been taking Ambien 5 mg at bedtime for sleep he wants refills.  Is not causing any problem.  No side effects he is resting better.  Ambien refill was given.    HYPERTENSION  Todd Phillips has long-standing essential hypertension. he is taking medications regularly. There are no medication side effects. Blood pressure is very well controlled. There has been no headache nausea chest pain. There has been no syncopal or presyncopal episode. he denies episodes of hypo-tension or accelerated hypertension.          Hyperlipidemia  Todd Phillips has long-standing history of hyperlipidemia. Has been trying to lose weight and trying to follow diet and activity recommandations. Patient is tolerating medications very well. There has been no side effects. Latest lipid levels have been fluctuating.      Diabetes Mellitus   Todd Phillips has type 2 diabetes mellitus. Taking oral hypoglycemic agents. Tolerating medications very well. he sugar is fluctuating. he is trying to diet and lose weight and exercise. There are no diabetic complications.      Patient has not had any chest pain lately. He had mildly positive stress test in the past however coronary angiography was not done because of renal failure. He currently is asymptomatic and has no chest       Renal failure  Stable. Creatinine around 1.49.  Patient is avoiding NSAID therapy and is drinking more  fluids      Anxiety  Significantly better and patient is sleeping better.      Gout  Patient has not had any gouty attack in quite some time. He is taking Zyloprim for hyperuricemia but only 100 mg daily because of her renal failure.      Past Surgical History:   Procedure Laterality Date   • CARDIAC CATHETERIZATION  11/01/1999   • CARDIOVASCULAR STRESS TEST  09/2012   • ECHO - CONVERTED  09/2012   • KIDNEY STONE SURGERY     • UPPER GASTROINTESTINAL ENDOSCOPY  08/30/2012     Family History   Problem Relation Age of Onset   • Heart attack Mother    • Heart disease Mother    • Stroke Mother    • Kidney disease Mother    • Heart failure Mother    • Lung disease Father    • Tuberculosis Father    • Diabetes Sister    • Heart attack Brother    • Heart failure Brother    • Heart disease Brother    • Diabetes Sister    • No Known Problems Brother    • No Known Problems Brother      Past Medical History:   Diagnosis Date   • Antral gastritis    • Borderline diabetes mellitus    • Chest pain    • COPD (chronic obstructive pulmonary disease)    • Duodenitis    • Emphysema of lung    • History of EKG 03/28/2016    NORMAL   • Hyperlipidemia    • Hypertension    • Obesity    • Palpitations    • Reflux esophagitis    • Renal failure     MILD     Patient Active Problem List   Diagnosis   • Palpitations   • Chest pain, with typical and atypical features   • Hyperlipidemia   • COPD (chronic obstructive pulmonary disease)   • Essential hypertension   • Chronic renal failure, stage 2 (mild)   • Borderline diabetes mellitus   • Gout without tophus   • Anxiety and depression   • Primary insomnia   • Gastroesophageal reflux disease without esophagitis   • Lymphadenopathy, submandibular       Social History   Substance Use Topics   • Smoking status: Never Smoker   • Smokeless tobacco: Never Used   • Alcohol use No      Comment: s/p 1975       Allergies   Allergen Reactions   • Morphine And Related        Current Outpatient Prescriptions  on File Prior to Visit   Medication Sig   • albuterol (PROVENTIL HFA;VENTOLIN HFA) 108 (90 BASE) MCG/ACT inhaler Inhale 2 puffs. EVERY 4-6 HOURS   • aspirin 81 MG tablet Take 81 mg by mouth daily.   • atenolol (TENORMIN) 25 MG tablet Take 25 mg by mouth daily.   • atorvastatin (LIPITOR) 10 MG tablet Take 5 mg by mouth daily.   • FLUoxetine (PROzac) 20 MG capsule Take 20 mg by mouth 3 (three) times a day.   • mometasone (ASMANEX) 220 MCG/INH inhaler Inhale 2 puffs 1 (one) time daily.   • montelukast (SINGULAIR) 10 MG tablet Take 10 mg by mouth daily.   • nitroglycerin (NITROSTAT) 0.4 MG SL tablet Place 0.4 mg under the tongue as needed for chest pain.   • pantoprazole (PROTONIX) 40 MG EC tablet Take 40 mg by mouth 2 (two) times a day.   • polyethylene glycol (MIRALAX) packet Take 17 g by mouth daily.   • raNITIdine (ZANTAC) 150 MG tablet Take 150 mg by mouth Every Night.   • ranolazine (RANEXA) 500 MG 12 hr tablet Take 1 tablet by mouth 2 (Two) Times a Day.   • Tiotropium Bromide-Olodaterol 2.5-2.5 MCG/ACT aerosol solution Inhale 2 puffs Daily.   • [DISCONTINUED] colchicine 0.6 MG tablet Take 1 tablet by mouth Daily.   • [DISCONTINUED] Zolpidem Tartrate 10 MG sublingual tablet Place  under the tongue every night.   • HYDROcodone-acetaminophen (NORCO) 5-325 MG per tablet Take 1 tablet by mouth Every 6 (Six) Hours As Needed for Severe Pain (7-10).     No current facility-administered medications on file prior to visit.          The following portions of the patient's history were reviewed and updated as appropriate: allergies, current medications, past family history, past medical history, past social history, past surgical history and problem list.    Review of Systems   Constitution: Negative.   HENT: Negative.  Negative for congestion and headaches.    Eyes: Negative.    Cardiovascular: Negative.  Negative for chest pain, cyanosis, dyspnea on exertion, irregular heartbeat, leg swelling, near-syncope, orthopnea,  "palpitations, paroxysmal nocturnal dyspnea and syncope.   Respiratory: Negative.  Negative for shortness of breath.    Hematologic/Lymphatic: Negative.    Skin: Negative.    Musculoskeletal: Positive for arthritis and back pain.   Gastrointestinal: Positive for heartburn.   Genitourinary: Negative.    Neurological: Negative.    Psychiatric/Behavioral: The patient has insomnia and is nervous/anxious.    Allergic/Immunologic: Negative.           Objective:     /98 (BP Location: Left arm, Patient Position: Sitting)  Pulse 59  Ht 72\" (182.9 cm)  Wt 227 lb 9.6 oz (103 kg)  SpO2 97%  BMI 30.87 kg/m2  Physical Exam   Constitutional: He appears well-developed and well-nourished.   HENT:   Head: Normocephalic and atraumatic.   Mouth/Throat: Oropharynx is clear and moist.   Eyes: Conjunctivae and EOM are normal. Pupils are equal, round, and reactive to light. No scleral icterus.   Neck: Normal range of motion. Neck supple. No JVD present. No tracheal deviation present. No thyromegaly present.   Cardiovascular: Normal rate, regular rhythm, normal heart sounds and intact distal pulses.  Exam reveals no friction rub.    No murmur heard.  Pulmonary/Chest: Effort normal and breath sounds normal. No respiratory distress. He has no wheezes. He has no rales. He exhibits no tenderness.   Abdominal: Soft. Bowel sounds are normal. He exhibits no distension and no mass. There is no tenderness. There is no rebound and no guarding.   Musculoskeletal: Normal range of motion. He exhibits no edema, tenderness or deformity.   Lymphadenopathy:     He has no cervical adenopathy.   Neurological: He is alert. He has normal reflexes. No cranial nerve deficit. He exhibits normal muscle tone. Coordination normal.   Skin: Skin is warm and dry.   Psychiatric: He has a normal mood and affect. His behavior is normal. Judgment and thought content normal.         Lab Review  Lab Results   Component Value Date     07/11/2017    K 5.3 " 07/11/2017     07/11/2017    BUN 20 07/11/2017    CREATININE 1.43 (H) 07/11/2017    GLUCOSE 115 (H) 07/11/2017    CALCIUM 10.3 (H) 07/11/2017    ALT 26 07/11/2017    ALKPHOS 71 07/11/2017    LABIL2 1.6 07/11/2017     Lab Results   Component Value Date    CKTOTAL 104 07/11/2017     Lab Results   Component Value Date    WBC 4.92 07/11/2017    HGB 15.0 07/11/2017    HCT 45.6 07/11/2017     07/11/2017     Lab Results   Component Value Date    INR <0.90 08/30/2016     No results found for: MG  Lab Results   Component Value Date    PSA 1.450 07/11/2017    TSH 1.494 10/07/2015     No results found for: BNP  Lab Results   Component Value Date    CHOL 188 07/11/2017    CHLPL 144 03/25/2016    TRIG 265 (H) 07/11/2017    HDL 40 (L) 07/11/2017    LDLCALC 95 07/11/2017    VLDL 53 07/11/2017    LDLHDL 2.38 07/11/2017         Procedures       I personally viewed and interpreted the patient's LAB data         Assessment:     1. Essential hypertension    2. Mixed hyperlipidemia    3. Chronic renal failure, stage 2 (mild)    4. Chronic obstructive pulmonary disease, unspecified COPD type    5. Anxiety and depression    6. Primary insomnia          Plan:      Lab work reviewed and discussed with the patient.  Cholesterol is slightly worse.  It is 188 compared to 144 last year.  Triglyceride also is elevated at 265.  LDL is 95.  Weight loss and risk factor modification was emphasized.  Patient was advised to take Lipitor 10 mg regularly.    Renal failure is stable.  Creatinine is 1.43 avoidance of NSAIDs and other nephrotoxic drugs was discussed.  Patient will drink more fluids to    For insomnia patient was given Ambien.  He does have anxiety disorder and is taking Prozac which will be continued.    GI symptoms are better but patient is requiring Protonix and Zantac.    Breathing is better with Proventil HFA.    Patient has no cardiac symptoms at this time.  Patient had mildly positive stress test medical management is  being done.  Patient is also taking Ranexa and is totally asymptomatic coronary angiography was not done because of renal failure and not being very symptomatic.    Follow-up scheduled        Return in about 3 months (around 10/26/2017).

## 2017-08-22 ENCOUNTER — TELEPHONE (OUTPATIENT)
Dept: CARDIOLOGY | Facility: CLINIC | Age: 67
End: 2017-08-22

## 2017-08-22 RX ORDER — ISOSORBIDE MONONITRATE 30 MG/1
30 TABLET, EXTENDED RELEASE ORAL DAILY
Qty: 90 TABLET | Refills: 2 | Status: SHIPPED | OUTPATIENT
Start: 2017-08-22 | End: 2017-10-23 | Stop reason: HOSPADM

## 2017-08-22 NOTE — TELEPHONE ENCOUNTER
----- Message from Adiel Denney MD sent at 8/22/2017 10:24 AM EDT -----  ok  ----- Message -----     From: Toña Gilbert MA     Sent: 8/18/2017  11:19 AM       To: Adiel Denney MD    [facility] ranexa is too expensive wants to change med to imdur. If so 30mg qd ok?

## 2017-10-22 ENCOUNTER — HOSPITAL ENCOUNTER (EMERGENCY)
Facility: HOSPITAL | Age: 67
Discharge: ANOTHER HOSPITAL | End: 2017-10-22
Attending: EMERGENCY MEDICINE | Admitting: EMERGENCY MEDICINE

## 2017-10-22 ENCOUNTER — HOSPITAL ENCOUNTER (INPATIENT)
Facility: HOSPITAL | Age: 67
LOS: 1 days | Discharge: HOME | End: 2017-10-23
Attending: INTERNAL MEDICINE | Admitting: INTERNAL MEDICINE

## 2017-10-22 ENCOUNTER — APPOINTMENT (OUTPATIENT)
Dept: GENERAL RADIOLOGY | Facility: HOSPITAL | Age: 67
End: 2017-10-22

## 2017-10-22 ENCOUNTER — APPOINTMENT (OUTPATIENT)
Dept: CARDIOLOGY | Facility: HOSPITAL | Age: 67
End: 2017-10-22
Attending: INTERNAL MEDICINE

## 2017-10-22 VITALS
RESPIRATION RATE: 18 BRPM | BODY MASS INDEX: 31.64 KG/M2 | SYSTOLIC BLOOD PRESSURE: 118 MMHG | OXYGEN SATURATION: 100 % | WEIGHT: 226 LBS | HEART RATE: 101 BPM | TEMPERATURE: 97.9 F | DIASTOLIC BLOOD PRESSURE: 95 MMHG | HEIGHT: 71 IN

## 2017-10-22 DIAGNOSIS — I48.91 ATRIAL FIBRILLATION WITH RAPID VENTRICULAR RESPONSE (HCC): Primary | ICD-10-CM

## 2017-10-22 DIAGNOSIS — R07.9 CHEST PAIN, UNSPECIFIED TYPE: ICD-10-CM

## 2017-10-22 PROBLEM — E11.9 DM2 (DIABETES MELLITUS, TYPE 2) (HCC): Status: ACTIVE | Noted: 2017-10-22

## 2017-10-22 PROBLEM — E11.22 CKD STAGE 3 SECONDARY TO DIABETES (HCC): Status: ACTIVE | Noted: 2017-10-22

## 2017-10-22 PROBLEM — N18.30 CKD STAGE 3 SECONDARY TO DIABETES (HCC): Status: ACTIVE | Noted: 2017-10-22

## 2017-10-22 PROBLEM — I20.9 ANGINA PECTORIS (HCC): Status: ACTIVE | Noted: 2017-10-22

## 2017-10-22 PROBLEM — I25.10 NONOBSTRUCTIVE ATHEROSCLEROSIS OF CORONARY ARTERY: Status: ACTIVE | Noted: 2017-10-22

## 2017-10-22 LAB
ALBUMIN SERPL-MCNC: 4.3 G/DL (ref 3.4–4.8)
ALBUMIN/GLOB SERPL: 1.6 G/DL (ref 1.5–2.5)
ALP SERPL-CCNC: 84 U/L (ref 40–129)
ALT SERPL W P-5'-P-CCNC: 33 U/L (ref 10–44)
ANION GAP SERPL CALCULATED.3IONS-SCNC: 6 MMOL/L (ref 3–11)
ANION GAP SERPL CALCULATED.3IONS-SCNC: 7.7 MMOL/L (ref 3.6–11.2)
APTT PPP: 26.3 SECONDS (ref 23.8–36.1)
AST SERPL-CCNC: 32 U/L (ref 10–34)
BASOPHILS # BLD AUTO: 0.03 10*3/MM3 (ref 0–0.3)
BASOPHILS NFR BLD AUTO: 0.5 % (ref 0–2)
BILIRUB SERPL-MCNC: 0.4 MG/DL (ref 0.2–1.8)
BILIRUB UR QL STRIP: NEGATIVE
BUN BLD-MCNC: 24 MG/DL (ref 9–23)
BUN BLD-MCNC: 30 MG/DL (ref 7–21)
BUN/CREAT SERPL: 17.1 (ref 7–25)
BUN/CREAT SERPL: 19.9 (ref 7–25)
CALCIUM SPEC-SCNC: 9 MG/DL (ref 8.7–10.4)
CALCIUM SPEC-SCNC: 9.7 MG/DL (ref 7.7–10)
CHLORIDE SERPL-SCNC: 107 MMOL/L (ref 99–112)
CHLORIDE SERPL-SCNC: 110 MMOL/L (ref 99–109)
CK MB SERPL-CCNC: 1.97 NG/ML (ref 0–5)
CK MB SERPL-RTO: 1.3 % (ref 0–3)
CK SERPL-CCNC: 151 U/L (ref 24–204)
CLARITY UR: CLEAR
CO2 SERPL-SCNC: 24 MMOL/L (ref 20–31)
CO2 SERPL-SCNC: 25.3 MMOL/L (ref 24.3–31.9)
COLOR UR: YELLOW
CREAT BLD-MCNC: 1.4 MG/DL (ref 0.6–1.3)
CREAT BLD-MCNC: 1.51 MG/DL (ref 0.43–1.29)
DEPRECATED RDW RBC AUTO: 44.3 FL (ref 37–54)
DEPRECATED RDW RBC AUTO: 46.2 FL (ref 37–54)
EOSINOPHIL # BLD AUTO: 0.36 10*3/MM3 (ref 0–0.7)
EOSINOPHIL NFR BLD AUTO: 6 % (ref 0–7)
ERYTHROCYTE [DISTWIDTH] IN BLOOD BY AUTOMATED COUNT: 13.8 % (ref 11.3–14.5)
ERYTHROCYTE [DISTWIDTH] IN BLOOD BY AUTOMATED COUNT: 13.8 % (ref 11.5–14.5)
GFR SERPL CREATININE-BSD FRML MDRD: 46 ML/MIN/1.73
GFR SERPL CREATININE-BSD FRML MDRD: 50 ML/MIN/1.73
GLOBULIN UR ELPH-MCNC: 2.7 GM/DL
GLUCOSE BLD-MCNC: 121 MG/DL (ref 70–100)
GLUCOSE BLD-MCNC: 166 MG/DL (ref 70–110)
GLUCOSE BLDC GLUCOMTR-MCNC: 102 MG/DL (ref 70–130)
GLUCOSE BLDC GLUCOMTR-MCNC: 85 MG/DL (ref 70–130)
GLUCOSE BLDC GLUCOMTR-MCNC: 99 MG/DL (ref 70–130)
GLUCOSE UR STRIP-MCNC: NEGATIVE MG/DL
HCT VFR BLD AUTO: 42.8 % (ref 38.9–50.9)
HCT VFR BLD AUTO: 43.3 % (ref 42–52)
HGB BLD-MCNC: 14.3 G/DL (ref 13.1–17.5)
HGB BLD-MCNC: 14.5 G/DL (ref 14–18)
HGB UR QL STRIP.AUTO: NEGATIVE
IMM GRANULOCYTES # BLD: 0.04 10*3/MM3 (ref 0–0.03)
IMM GRANULOCYTES NFR BLD: 0.7 % (ref 0–0.5)
INR PPP: 0.92 (ref 0.9–1.1)
KETONES UR QL STRIP: NEGATIVE
LEUKOCYTE ESTERASE UR QL STRIP.AUTO: NEGATIVE
LYMPHOCYTES # BLD AUTO: 2.37 10*3/MM3 (ref 1–3)
LYMPHOCYTES NFR BLD AUTO: 39.8 % (ref 16–46)
MAGNESIUM SERPL-MCNC: 1.9 MG/DL (ref 1.3–2.7)
MCH RBC QN AUTO: 30.1 PG (ref 27–33)
MCH RBC QN AUTO: 30.6 PG (ref 27–31)
MCHC RBC AUTO-ENTMCNC: 33.4 G/DL (ref 32–36)
MCHC RBC AUTO-ENTMCNC: 33.5 G/DL (ref 33–37)
MCV RBC AUTO: 90 FL (ref 80–94)
MCV RBC AUTO: 91.5 FL (ref 80–99)
MONOCYTES # BLD AUTO: 0.77 10*3/MM3 (ref 0.1–0.9)
MONOCYTES NFR BLD AUTO: 12.9 % (ref 0–12)
NEUTROPHILS # BLD AUTO: 2.39 10*3/MM3 (ref 1.4–6.5)
NEUTROPHILS NFR BLD AUTO: 40.1 % (ref 40–75)
NITRITE UR QL STRIP: NEGATIVE
OSMOLALITY SERPL CALC.SUM OF ELEC: 289.3 MOSM/KG (ref 273–305)
PH UR STRIP.AUTO: 5.5 [PH] (ref 5–8)
PLATELET # BLD AUTO: 163 10*3/MM3 (ref 150–450)
PLATELET # BLD AUTO: 175 10*3/MM3 (ref 130–400)
PMV BLD AUTO: 10.1 FL (ref 6–10)
PMV BLD AUTO: 9.6 FL (ref 6–12)
POTASSIUM BLD-SCNC: 3.9 MMOL/L (ref 3.5–5.3)
POTASSIUM BLD-SCNC: 4.6 MMOL/L (ref 3.5–5.5)
PROT SERPL-MCNC: 7 G/DL (ref 6–8)
PROT UR QL STRIP: NEGATIVE
PROTHROMBIN TIME: 12.4 SECONDS (ref 11–15.4)
RBC # BLD AUTO: 4.68 10*6/MM3 (ref 4.2–5.76)
RBC # BLD AUTO: 4.81 10*6/MM3 (ref 4.7–6.1)
SODIUM BLD-SCNC: 140 MMOL/L (ref 132–146)
SODIUM BLD-SCNC: 140 MMOL/L (ref 135–153)
SP GR UR STRIP: 1.01 (ref 1–1.03)
TROPONIN I SERPL-MCNC: 0.01 NG/ML
TROPONIN I SERPL-MCNC: 0.02 NG/ML
TROPONIN I SERPL-MCNC: <0.006 NG/ML
TSH SERPL DL<=0.05 MIU/L-ACNC: 2.18 MIU/ML (ref 0.35–5.35)
UROBILINOGEN UR QL STRIP: NORMAL
WBC NRBC COR # BLD: 5.09 10*3/MM3 (ref 3.5–10.8)
WBC NRBC COR # BLD: 5.96 10*3/MM3 (ref 4.5–12.5)

## 2017-10-22 PROCEDURE — 82550 ASSAY OF CK (CPK): CPT | Performed by: PHYSICIAN ASSISTANT

## 2017-10-22 PROCEDURE — 93010 ELECTROCARDIOGRAM REPORT: CPT | Performed by: INTERNAL MEDICINE

## 2017-10-22 PROCEDURE — 82553 CREATINE MB FRACTION: CPT | Performed by: PHYSICIAN ASSISTANT

## 2017-10-22 PROCEDURE — 99223 1ST HOSP IP/OBS HIGH 75: CPT | Performed by: INTERNAL MEDICINE

## 2017-10-22 PROCEDURE — 81003 URINALYSIS AUTO W/O SCOPE: CPT | Performed by: PHYSICIAN ASSISTANT

## 2017-10-22 PROCEDURE — 85610 PROTHROMBIN TIME: CPT | Performed by: PHYSICIAN ASSISTANT

## 2017-10-22 PROCEDURE — 93306 TTE W/DOPPLER COMPLETE: CPT | Performed by: INTERNAL MEDICINE

## 2017-10-22 PROCEDURE — 84443 ASSAY THYROID STIM HORMONE: CPT | Performed by: INTERNAL MEDICINE

## 2017-10-22 PROCEDURE — 85027 COMPLETE CBC AUTOMATED: CPT | Performed by: INTERNAL MEDICINE

## 2017-10-22 PROCEDURE — 80053 COMPREHEN METABOLIC PANEL: CPT | Performed by: PHYSICIAN ASSISTANT

## 2017-10-22 PROCEDURE — 85730 THROMBOPLASTIN TIME PARTIAL: CPT | Performed by: PHYSICIAN ASSISTANT

## 2017-10-22 PROCEDURE — 71010 HC CHEST PA OR AP: CPT

## 2017-10-22 PROCEDURE — 94640 AIRWAY INHALATION TREATMENT: CPT

## 2017-10-22 PROCEDURE — 83735 ASSAY OF MAGNESIUM: CPT | Performed by: INTERNAL MEDICINE

## 2017-10-22 PROCEDURE — 96365 THER/PROPH/DIAG IV INF INIT: CPT

## 2017-10-22 PROCEDURE — 99222 1ST HOSP IP/OBS MODERATE 55: CPT | Performed by: INTERNAL MEDICINE

## 2017-10-22 PROCEDURE — 85025 COMPLETE CBC W/AUTO DIFF WBC: CPT | Performed by: PHYSICIAN ASSISTANT

## 2017-10-22 PROCEDURE — 93005 ELECTROCARDIOGRAM TRACING: CPT | Performed by: PHYSICIAN ASSISTANT

## 2017-10-22 PROCEDURE — 84484 ASSAY OF TROPONIN QUANT: CPT | Performed by: INTERNAL MEDICINE

## 2017-10-22 PROCEDURE — 82962 GLUCOSE BLOOD TEST: CPT

## 2017-10-22 PROCEDURE — 93306 TTE W/DOPPLER COMPLETE: CPT

## 2017-10-22 PROCEDURE — 84484 ASSAY OF TROPONIN QUANT: CPT | Performed by: PHYSICIAN ASSISTANT

## 2017-10-22 PROCEDURE — 96372 THER/PROPH/DIAG INJ SC/IM: CPT

## 2017-10-22 PROCEDURE — 96366 THER/PROPH/DIAG IV INF ADDON: CPT

## 2017-10-22 PROCEDURE — 93005 ELECTROCARDIOGRAM TRACING: CPT | Performed by: INTERNAL MEDICINE

## 2017-10-22 PROCEDURE — 25010000002 ENOXAPARIN PER 10 MG: Performed by: PHYSICIAN ASSISTANT

## 2017-10-22 PROCEDURE — 99285 EMERGENCY DEPT VISIT HI MDM: CPT

## 2017-10-22 PROCEDURE — 71010 XR CHEST 1 VW: CPT | Performed by: RADIOLOGY

## 2017-10-22 RX ORDER — ASPIRIN 81 MG/1
81 TABLET, CHEWABLE ORAL DAILY
Status: DISCONTINUED | OUTPATIENT
Start: 2017-10-22 | End: 2017-10-23 | Stop reason: HOSPADM

## 2017-10-22 RX ORDER — COLCHICINE 0.6 MG/1
0.6 TABLET ORAL DAILY
Status: DISCONTINUED | OUTPATIENT
Start: 2017-10-22 | End: 2017-10-22

## 2017-10-22 RX ORDER — NITROGLYCERIN 0.4 MG/1
0.4 TABLET SUBLINGUAL AS NEEDED
Status: DISCONTINUED | OUTPATIENT
Start: 2017-10-22 | End: 2017-10-23 | Stop reason: HOSPADM

## 2017-10-22 RX ORDER — HYDROCODONE BITARTRATE AND ACETAMINOPHEN 5; 325 MG/1; MG/1
1 TABLET ORAL EVERY 6 HOURS PRN
Status: DISCONTINUED | OUTPATIENT
Start: 2017-10-22 | End: 2017-10-23 | Stop reason: HOSPADM

## 2017-10-22 RX ORDER — GLIPIZIDE 5 MG/1
5 TABLET ORAL DAILY
COMMUNITY
End: 2017-11-30

## 2017-10-22 RX ORDER — ONDANSETRON 4 MG/1
4 TABLET, FILM COATED ORAL EVERY 6 HOURS PRN
Status: DISCONTINUED | OUTPATIENT
Start: 2017-10-22 | End: 2017-10-23 | Stop reason: HOSPADM

## 2017-10-22 RX ORDER — SODIUM CHLORIDE 0.9 % (FLUSH) 0.9 %
10 SYRINGE (ML) INJECTION AS NEEDED
Status: DISCONTINUED | OUTPATIENT
Start: 2017-10-22 | End: 2017-10-22 | Stop reason: HOSPADM

## 2017-10-22 RX ORDER — ONDANSETRON 2 MG/ML
4 INJECTION INTRAMUSCULAR; INTRAVENOUS EVERY 6 HOURS PRN
Status: DISCONTINUED | OUTPATIENT
Start: 2017-10-22 | End: 2017-10-23 | Stop reason: HOSPADM

## 2017-10-22 RX ORDER — RANOLAZINE 500 MG/1
500 TABLET, EXTENDED RELEASE ORAL 2 TIMES DAILY
Status: DISCONTINUED | OUTPATIENT
Start: 2017-10-22 | End: 2017-10-23

## 2017-10-22 RX ORDER — MONTELUKAST SODIUM 10 MG/1
10 TABLET ORAL DAILY
Status: DISCONTINUED | OUTPATIENT
Start: 2017-10-22 | End: 2017-10-23 | Stop reason: HOSPADM

## 2017-10-22 RX ORDER — BUDESONIDE 0.5 MG/2ML
0.5 INHALANT ORAL
Status: DISCONTINUED | OUTPATIENT
Start: 2017-10-22 | End: 2017-10-23 | Stop reason: HOSPADM

## 2017-10-22 RX ORDER — POLYETHYLENE GLYCOL 3350 17 G/17G
17 POWDER, FOR SOLUTION ORAL DAILY
Status: DISCONTINUED | OUTPATIENT
Start: 2017-10-22 | End: 2017-10-23 | Stop reason: HOSPADM

## 2017-10-22 RX ORDER — HYDROCODONE BITARTRATE AND ACETAMINOPHEN 5; 325 MG/1; MG/1
1 TABLET ORAL ONCE
Status: COMPLETED | OUTPATIENT
Start: 2017-10-22 | End: 2017-10-22

## 2017-10-22 RX ORDER — ALLOPURINOL 100 MG/1
100 TABLET ORAL DAILY
Status: DISCONTINUED | OUTPATIENT
Start: 2017-10-22 | End: 2017-10-23 | Stop reason: HOSPADM

## 2017-10-22 RX ORDER — ATENOLOL 25 MG/1
25 TABLET ORAL DAILY
Status: DISCONTINUED | OUTPATIENT
Start: 2017-10-22 | End: 2017-10-22

## 2017-10-22 RX ORDER — DEXTROSE MONOHYDRATE 25 G/50ML
25 INJECTION, SOLUTION INTRAVENOUS
Status: DISCONTINUED | OUTPATIENT
Start: 2017-10-22 | End: 2017-10-23 | Stop reason: HOSPADM

## 2017-10-22 RX ORDER — SODIUM CHLORIDE 0.9 % (FLUSH) 0.9 %
1-10 SYRINGE (ML) INJECTION AS NEEDED
Status: DISCONTINUED | OUTPATIENT
Start: 2017-10-22 | End: 2017-10-23 | Stop reason: HOSPADM

## 2017-10-22 RX ORDER — NICOTINE POLACRILEX 4 MG
15 LOZENGE BUCCAL
Status: DISCONTINUED | OUTPATIENT
Start: 2017-10-22 | End: 2017-10-23 | Stop reason: HOSPADM

## 2017-10-22 RX ORDER — PANTOPRAZOLE SODIUM 40 MG/1
40 TABLET, DELAYED RELEASE ORAL
Status: DISCONTINUED | OUTPATIENT
Start: 2017-10-23 | End: 2017-10-23 | Stop reason: HOSPADM

## 2017-10-22 RX ORDER — FLUOXETINE HYDROCHLORIDE 20 MG/1
40 CAPSULE ORAL DAILY
Status: DISCONTINUED | OUTPATIENT
Start: 2017-10-22 | End: 2017-10-23 | Stop reason: HOSPADM

## 2017-10-22 RX ORDER — ATORVASTATIN CALCIUM 10 MG/1
5 TABLET, FILM COATED ORAL DAILY
Status: DISCONTINUED | OUTPATIENT
Start: 2017-10-22 | End: 2017-10-23 | Stop reason: HOSPADM

## 2017-10-22 RX ORDER — DILTIAZEM HCL IN NACL,ISO-OSM 125 MG/125
5-15 PLASTIC BAG, INJECTION (ML) INTRAVENOUS
Status: DISCONTINUED | OUTPATIENT
Start: 2017-10-22 | End: 2017-10-22

## 2017-10-22 RX ORDER — SODIUM CHLORIDE 9 MG/ML
INJECTION, SOLUTION INTRAVENOUS
Status: DISCONTINUED
Start: 2017-10-22 | End: 2017-10-22 | Stop reason: HOSPADM

## 2017-10-22 RX ORDER — RANOLAZINE 500 MG/1
500 TABLET, EXTENDED RELEASE ORAL 2 TIMES DAILY
COMMUNITY
End: 2017-10-23 | Stop reason: HOSPADM

## 2017-10-22 RX ADMIN — APIXABAN 5 MG: 5 TABLET, FILM COATED ORAL at 14:24

## 2017-10-22 RX ADMIN — BUDESONIDE 0.5 MG: 0.5 INHALANT RESPIRATORY (INHALATION) at 22:00

## 2017-10-22 RX ADMIN — FLUOXETINE HYDROCHLORIDE 40 MG: 20 CAPSULE ORAL at 13:25

## 2017-10-22 RX ADMIN — SODIUM CHLORIDE 1000 ML: 9 INJECTION, SOLUTION INTRAVENOUS at 02:57

## 2017-10-22 RX ADMIN — ENOXAPARIN SODIUM 100 MG: 100 INJECTION SUBCUTANEOUS at 02:57

## 2017-10-22 RX ADMIN — APIXABAN 5 MG: 5 TABLET, FILM COATED ORAL at 21:06

## 2017-10-22 RX ADMIN — ALLOPURINOL 100 MG: 100 TABLET ORAL at 13:25

## 2017-10-22 RX ADMIN — RANOLAZINE 500 MG: 500 TABLET, FILM COATED, EXTENDED RELEASE ORAL at 17:24

## 2017-10-22 RX ADMIN — HYDROCODONE BITARTRATE AND ACETAMINOPHEN 1 TABLET: 5; 325 TABLET ORAL at 05:24

## 2017-10-22 RX ADMIN — ATORVASTATIN CALCIUM 5 MG: 10 TABLET, FILM COATED ORAL at 13:25

## 2017-10-22 RX ADMIN — SODIUM CHLORIDE 1000 ML: 9 INJECTION, SOLUTION INTRAVENOUS at 06:57

## 2017-10-22 RX ADMIN — DILTIAZEM HYDROCHLORIDE 5 MG/HR: 100 INJECTION, POWDER, LYOPHILIZED, FOR SOLUTION INTRAVENOUS at 02:54

## 2017-10-22 RX ADMIN — METOPROLOL TARTRATE 25 MG: 25 TABLET ORAL at 21:07

## 2017-10-22 RX ADMIN — MONTELUKAST SODIUM 10 MG: 10 TABLET, FILM COATED ORAL at 13:26

## 2017-10-22 RX ADMIN — ASPIRIN 81 MG 81 MG: 81 TABLET ORAL at 13:26

## 2017-10-22 RX ADMIN — Medication 5 MG/HR: at 11:00

## 2017-10-22 RX ADMIN — POLYETHYLENE GLYCOL 3350 17 G: 17 POWDER, FOR SOLUTION ORAL at 13:25

## 2017-10-22 RX ADMIN — ATENOLOL 25 MG: 25 TABLET ORAL at 13:25

## 2017-10-22 NOTE — ED NOTES
[facility] here to transport pt to [facility]      Marco Antonio Renteria  10/22/17 0845

## 2017-10-22 NOTE — ED NOTES
Called [facility] for transfer, spoke with Mickey. He will call us back with Dr. Burns.      Heather Symes  10/22/17 7338

## 2017-10-22 NOTE — ED NOTES
Called [facility] spoke with Ranjit advised its a ALS transfer. They accepted the transfer, he states it will probably be after shift change. No ETA was given. SAGAR Gaona RN is aware.      Heather Symes  10/22/17 0623

## 2017-10-22 NOTE — H&P
[facility]  HISTORY AND PHYSICAL    Patient Name: Todd Phillips  : 1950  MRN: [id number]  Primary Care Physician: Adiel Denney MD    Subjective   Subjective     Chief Complaint:  Chest pain and atrial fibrillation    HPI:  Todd Phillips is a 67 y.o. male with history of non-obstructive CAD (heart cath in  with 20% lad dz, otherwise nonobstructive plaque noted), copd, ckd 3 (baseline cr 1.4-1.5), dm2, HL who follows with dr. maloney (cardiologist) in [place], [place].   Patient was in his usual state of health Saturday 10/21 evening when developed palpitations, accompanying retrosternal chest pressure (8/10 intensity) with no radiation, + accompanying nausea (no emesis) and dyspnea. At local ER, noted to be in Atrial fibrillation with rate in 140's, troponin normal (as was a second set), started on diltiazem with cessation of symptoms. No preceding fever, no cough.  Apparently there were no [facility] available [facility].  At time of my interview, patient no chest pain, no palpitations. On telemetry appears in sinus rhythm. Feeling much better      Review of Systems   No f/c, no headache, no rash, no sore throat, no confusion, no dysuria, no abd pain, no vomiting or diarrhea.     Otherwise complete 10-system ROS is negative except as mentioned in the HPI.    Personal History     Past Medical History:   Diagnosis Date   • Antral gastritis    • Borderline diabetes mellitus    • Chest pain    • COPD (chronic obstructive pulmonary disease)    • Coronary artery disease    • Diabetes mellitus    • Duodenitis    • Emphysema of lung    • History of EKG 2016    NORMAL   • Hyperlipidemia    • Hypertension    • Kidney stone    • Obesity    • Palpitations    • Reflux esophagitis    • Renal failure     MILD       Past Surgical History:   Procedure Laterality Date   • CARDIAC  CATHETERIZATION  11/01/1999   • CARDIOVASCULAR STRESS TEST  09/2012   • ECHO - CONVERTED  09/2012   • KIDNEY STONE SURGERY     • UPPER GASTROINTESTINAL ENDOSCOPY  08/30/2012       Family History: family history includes Diabetes in his sister and sister; Heart attack in his brother and mother; Heart disease in his brother and mother; Heart failure in his brother and mother; Kidney disease in his mother; Lung disease in his father; No Known Problems in his brother and brother; Stroke in his mother; Tuberculosis in his father.     Social History:  reports that he has never smoked. He has never used smokeless tobacco. He reports that he does not drink alcohol or use illicit drugs.    Medications:  Prescriptions Prior to Admission   Medication Sig Dispense Refill Last Dose   • albuterol (PROVENTIL HFA;VENTOLIN HFA) 108 (90 BASE) MCG/ACT inhaler Inhale 2 puffs. EVERY 4-6 HOURS   Taking   • aspirin 81 MG tablet Take 81 mg by mouth daily.   Taking   • atenolol (TENORMIN) 25 MG tablet Take 25 mg by mouth daily.   Taking   • atorvastatin (LIPITOR) 10 MG tablet Take 5 mg by mouth daily.   Taking   • colchicine 0.6 MG tablet Take 1 tablet by mouth Daily. 90 tablet 2    • FLUoxetine (PROzac) 20 MG capsule Take 20 mg by mouth 3 (three) times a day.   Taking   • glipiZIDE (GLUCOTROL) 5 MG tablet Take 5 mg by mouth Daily.      • mometasone (ASMANEX) 220 MCG/INH inhaler Inhale 2 puffs 1 (one) time daily.   Taking   • montelukast (SINGULAIR) 10 MG tablet Take 10 mg by mouth daily.   Taking   • pantoprazole (PROTONIX) 40 MG EC tablet Take 40 mg by mouth 2 (two) times a day.   Taking   • raNITIdine (ZANTAC) 150 MG tablet Take 150 mg by mouth Every Night.   Taking   • ranolazine (RANEXA) 500 MG 12 hr tablet Take 500 mg by mouth 2 (Two) Times a Day.      • Tiotropium Bromide-Olodaterol 2.5-2.5 MCG/ACT aerosol solution Inhale 2 puffs Daily.   Taking   • zolpidem (AMBIEN) 5 MG tablet Take 1 tablet by mouth At Night As Needed for Sleep. 30  tablet 3    • allopurinol (ZYLOPRIM) 100 MG tablet take 1 tablet by mouth once daily  0 Taking   • HYDROcodone-acetaminophen (NORCO) 5-325 MG per tablet Take 1 tablet by mouth Every 6 (Six) Hours As Needed for Severe Pain (7-10). 10 tablet 0 Not Taking   • isosorbide mononitrate (IMDUR) 30 MG 24 hr tablet Take 1 tablet by mouth Daily. 90 tablet 2    • nitroglycerin (NITROSTAT) 0.4 MG SL tablet Place 0.4 mg under the tongue as needed for chest pain.   Taking   • polyethylene glycol (MIRALAX) packet Take 17 g by mouth daily.   Taking   • traZODone (DESYREL) 100 MG tablet take 1/2 tablet by mouth AT  BEDTIME FOR 1 WEEK THEN INCREASE TO 1 TABLET AT BEDTIME  0 Not Taking       Allergies   Allergen Reactions   • Morphine And Related      Morphine caused pt to isaac       Objective   Objective     Vital Signs: Temp:  [97.7 °F (36.5 °C)-97.9 °F (36.6 °C)] 97.9 °F (36.6 °C)  Heart Rate:  [] 64  Resp:  [18-20] 18  BP: ()/(58-95) 114/87     Physical Exam   Alert, oriented x 4  Ncat, oroph clear  rrr  ctab  abd soft, nontender  No cce  No extremity rash  Face symmetric, speech clear, equal   Normal affect    Results Reviewed:  I have personally reviewed current lab, radiology, and data and agree.      Results from last 7 days  Lab Units 10/22/17  1059 10/22/17  0300   WBC 10*3/mm3 5.09 5.96   HEMOGLOBIN g/dL 14.3 14.5   HEMATOCRIT % 42.8 43.3   PLATELETS 10*3/mm3 163 175   INR   --  0.92       Results from last 7 days  Lab Units 10/22/17  1059 10/22/17  0557 10/22/17  0300   SODIUM mmol/L 140  --  140   POTASSIUM mmol/L 4.6  --  3.9   CHLORIDE mmol/L 110*  --  107   CO2 mmol/L 24.0  --  25.3   BUN mg/dL 24*  --  30*   CREATININE mg/dL 1.40*  --  1.51*   GLUCOSE mg/dL 121*  --  166*   CALCIUM mg/dL 9.0  --  9.7   ALT (SGPT) U/L  --   --  33   AST (SGOT) U/L  --   --  32   TROPONIN I ng/mL 0.018 0.008 <0.006     No results found for: BNP  No results found for: PHART  Imaging Results (last 24 hours)     ** No  results found for the last 24 hours. **             Assessment/Plan   Assessment / Plan     Hospital Problem List     Hyperlipidemia    COPD (chronic obstructive pulmonary disease)    Essential hypertension    Gout without tophus    New onset a-fib    Atrial fibrillation with RVR    Angina pectoris    Nonobstructive atherosclerosis of coronary artery    Overview Signed 10/22/2017 12:37 PM by Jono Cruz MD     [facility] 2014 dr. Reyes: 20% dz LAD, mild plaque         CKD stage 3 secondary to diabetes    Overview Signed 10/22/2017 12:37 PM by Jono Cruz MD     Baseline creatinine 1.4-1.5         DM2 (diabetes mellitus, type 2)    Atrial fibrillation        tsh normal      Assessment & Plan:  -wean dilt drip and use prn for rate control  -restart atenolol  -echo ordered/pending  -cards consult; spoke w/ dr. luna and he will see later (decide on stress test, etc)  -continue asa, lipitor for now  -a.m. Lipid panel, a1c, bmp  -tomorrow re-assess dvt prophylaxis needs (see below)  DVT prophylaxis: received 100mg sq lovenox at 02:57 10/22/17 a.m. At outside facility       CODE STATUS: full    Admission Status:  I believe this patient meets observation for now, reevaluate based on clinical response    Jono Cruz MD   10/22/17   12:47 PM    Addendum:  Making inpatient

## 2017-10-22 NOTE — NURSING NOTE
ACC REVIEW REPORT: [facility]        PATIENT NAME: Todd Phillips    PATIENT ID: [id number]    BED: N611    BED TYPE: TELEMETRY    BED GIVEN TO: ROHIT PEREZ    TIME BED GIVEN: 0600    YOB: 1950    AGE: 67 YEARS OLD    GENDER: MALE    TODAY'S DATE: 10/22/2017    TRANSFER DATE: 10/22/2017    TRANSFERRING FACILITY: 10/22/2017 @ 0458    TRANSFERRING FACILITY PHONE # : [facility]    DATE/TIME REQUEST RECEIVED: 10/22/2017 @ [address] [place] [address] RN: PITO NIEVES RN    REPORT FROM: ROHIT PEREZ    TIME REPORT TAKEN: 0600    DIAGNOSIS: AFIB WITH RVR    REASON FOR TRANSFER TO [facility]: HIGHER LEVEL OF CARE    TRANSPORTATION: AMBULANCE    CLINICAL REASON FOR TRANSFER TO [facility]: THE PATIENT WENT TO THE ER COMPLAINING OF LEFT SIDED CHEST PAIN WITH PRESSURE.  HE WAS FOUND TO BE IN AFIB WITH RVR. HE HAS NO HISTORY.  INITIALLY HIS HEART RATE WAS IN THE 140s. IT IS NOW RUNNING BETWEEN 70-90s ON A CARDIZEM GTT.      CLINICAL INFORMATION    ALLERGIES: MORPHINE    LAST VITAL SIGNS:  TIME: 0600  TEMP: 97.7  PULSE: 76  B/P: 117/79  RESP: 18    LAB INFORMATION: TROPONIN IS NEGATIVE, GLUCOSE 166  CREATNINE 1.51  BUN 30    MEDS/IV FLUIDS: #20 GAUGE IN THE LEFT HAND   #20 GAUGE IN THE RIGHT FA   #18 GAUGE IN THE LEFT AC  1 LITER NS BOLUS  LOVENOX SQ  CARDIZEM BOLUS OF 10 AND A GTT AT 5    3 SL NITRO PTA    MG PO      CARDIAC SYSTEM:    CHEST PAIN: YES    RATE: CURRENTLY 70-90    RHYTHM: AFIB    CARDIAC ENZYMES: NEGATIVE    RESPIRATORY SYSTEM:    OXYGEN: 2 LITERS    O2 SAT: 100%    ADMINISTRATION ROUTE: PNC    CNS/MUSCULOSKELETAL    ALERT AND ORIENTED: X3    PAST MEDICAL HISTORY: INCREASED CHOLESTEROL, COPD, HTN, DIABETES, CAD, PREVIOUS HEART CATH, AND KIDNEY DISEASE      Pito Nieves RN  10/22/2017  6:06 AM

## 2017-10-22 NOTE — ED PROVIDER NOTES
Subjective   HPI Comments: This is a 14-year-old male patient presents to ER with chief complaint of chest pressure.  This began 5:30 PM.  Located in the left chest and does not radiate.  At its highest it was 8 out of 10 in intensity but on presentation was 6 out of 10 in intensity.  Patient took a total of 3 sublingual nitros, one at home and 2 in the ambulance.  He was also given aspirin in the ambulance.  In addition to the chest pain, patient is having palpitations and shortness of breath.  Nothing known to aggravate the chest pain.  It is at rest as well as with movement.  Patient sees Dr. Denney as his cardiologist.  He has never had any MI that he is aware of.  He says he had a cardiac cath 3-4 years ago but did not require stenting.  His last stress test was 2 years ago.  I looked into one of Dr. Denney's notes said that it showed some abnormality but because of his chronic renal failure that is stage II they did not proceed with cardiac catheterization.  He has no known history of atrial fibrillation or rhythm issues.  He is not on any blood thinners or medications for rate control.  He also has COPD and uses an albuterol inhaler but does not utilize oxygen therapy.  He does have hypertension, hyperlipidemia, and is obese.  He has never been a smoker.    Patient is a 67 y.o. male presenting with chest pain.   Chest Pain   Pain location:  L chest  Pain quality: pressure    Pain radiates to:  Does not radiate  Pain severity:  Moderate  Onset quality:  Sudden  Duration:  9 hours  Timing:  Constant  Progression:  Waxing and waning  Chronicity:  New  Context: movement and at rest    Context: not breathing, not drug use, not eating, not intercourse, not lifting, not raising an arm, not stress and not trauma    Relieved by:  Aspirin and nitroglycerin  Worsened by:  Nothing  Associated symptoms: palpitations and shortness of breath    Associated symptoms: no abdominal pain, no AICD problem, no altered mental  status, no anorexia, no anxiety, no back pain, no claudication, no cough, no diaphoresis, no dizziness, no dysphagia, no fatigue, no fever, no headache, no heartburn, no lower extremity edema, no nausea, no near-syncope, no numbness, no orthopnea, no PND, no syncope, no vomiting and no weakness    Risk factors: coronary artery disease, diabetes mellitus, high cholesterol, hypertension, male sex and obesity    Risk factors: no aortic disease, no birth control, no Misti-Danlos syndrome, no immobilization, no Marfan's syndrome, not pregnant, no prior DVT/PE, no smoking and no surgery        Review of Systems   Constitutional: Negative.  Negative for diaphoresis, fatigue and fever.   HENT: Negative.  Negative for trouble swallowing.    Respiratory: Positive for shortness of breath. Negative for cough and wheezing.    Cardiovascular: Positive for chest pain and palpitations. Negative for orthopnea, claudication, leg swelling, syncope, PND and near-syncope.   Gastrointestinal: Negative.  Negative for abdominal pain, anorexia, heartburn, nausea and vomiting.   Endocrine: Negative.    Genitourinary: Negative.  Negative for dysuria.   Musculoskeletal: Negative for back pain.   Skin: Negative.    Neurological: Negative.  Negative for dizziness, weakness, numbness and headaches.   Psychiatric/Behavioral: Negative.    All other systems reviewed and are negative.      Past Medical History:   Diagnosis Date   • Antral gastritis    • Borderline diabetes mellitus    • Chest pain    • COPD (chronic obstructive pulmonary disease)    • Coronary artery disease    • Diabetes mellitus    • Duodenitis    • Emphysema of lung    • History of EKG 03/28/2016    NORMAL   • Hyperlipidemia    • Hypertension    • Kidney stone    • Obesity    • Palpitations    • Reflux esophagitis    • Renal failure     MILD       Allergies   Allergen Reactions   • Morphine And Related        Past Surgical History:   Procedure Laterality Date   • CARDIAC  CATHETERIZATION  11/01/1999   • CARDIOVASCULAR STRESS TEST  09/2012   • ECHO - CONVERTED  09/2012   • KIDNEY STONE SURGERY     • UPPER GASTROINTESTINAL ENDOSCOPY  08/30/2012       Family History   Problem Relation Age of Onset   • Heart attack Mother    • Heart disease Mother    • Stroke Mother    • Kidney disease Mother    • Heart failure Mother    • Lung disease Father    • Tuberculosis Father    • Diabetes Sister    • Heart attack Brother    • Heart failure Brother    • Heart disease Brother    • Diabetes Sister    • No Known Problems Brother    • No Known Problems Brother        Social History     Social History   • Marital status:      Spouse name: N/A   • Number of children: N/A   • Years of education: N/A     Social History Main Topics   • Smoking status: Never Smoker   • Smokeless tobacco: Never Used   • Alcohol use No      Comment: s/p 1975   • Drug use: No   • Sexual activity: Not Asked     Other Topics Concern   • None     Social History Narrative   • None           Objective   Physical Exam   Constitutional: He is oriented to person, place, and time. He appears well-developed and well-nourished. No distress.   HENT:   Head: Normocephalic and atraumatic.   Right Ear: External ear normal.   Left Ear: External ear normal.   Nose: Nose normal.   Eyes: Conjunctivae and EOM are normal. Pupils are equal, round, and reactive to light.   Neck: Normal range of motion. Neck supple. No JVD present. No tracheal deviation present.   Cardiovascular: Normal rate, regular rhythm and normal heart sounds.    No murmur heard.  Pulmonary/Chest: Effort normal and breath sounds normal. No respiratory distress. He has no wheezes.   Abdominal: Soft. Bowel sounds are normal. There is no tenderness.   Musculoskeletal: Normal range of motion. He exhibits no edema or deformity.   Neurological: He is alert and oriented to person, place, and time. No cranial nerve deficit.   Skin: Skin is warm and dry. No rash noted. He is not  diaphoretic. No erythema. No pallor.   Psychiatric: He has a normal mood and affect. His behavior is normal. Thought content normal.   Nursing note and vitals reviewed.      Procedures         ED Course  ED Course   Value Comment By Time   ECG 12 Lead A. Fibb with RVR; no other acute changes per Dr. Carbalol. NORBERT Pascual 10/22 0247   XR Chest 1 View Cardiomegaly, minimally increased vascularization; otherwise negative per Dr. Carballo. NORBERT Pascual 10/22 0400    Spoke with Dr. Ba and he said to call Dr. Ross and if he admits, he will be happy to consult. NORBERT Pascual 10/22 0416    Dr. Ross says the patient will have to be transferred as we have no CCU or PCU beds. NORBERT Pascual 10/22 0433    Dr. Mcleod at [facility] is capped out on patients so we would have to wait for dayshift hospitalist after 8 am. NORBERT Pascual 10/22 5372    Patient will be admitted to [facility] under the care of Dr. Burns. NORBERT Pascual 10/22 0508                  MDM  Number of Diagnoses or Management Options     Amount and/or Complexity of Data Reviewed  Clinical lab tests: reviewed and ordered  Tests in the radiology section of CPT®: reviewed and ordered  Discuss the patient with other providers: yes        Final diagnoses:   Atrial fibrillation with rapid ventricular response   Chest pain, unspecified type            NORBERT Pascual  10/22/17 0638

## 2017-10-22 NOTE — CONSULTS
Electrophysiology Consultation     Todd Phillips  [id number]/1  [id number]  1950    DATE OF ADMISSION: 10/22/2017    Adiel Denney MD      Chief complaint: chest pain and afib     History of Present Illness     Patient is a 66 yo male with a hx of DM 2, HTN, HLD, COPD, and CKD that presented to ED at [facility] early this AM with complaints of chest pain and palpitations. He was in his usual state of health last night until he developed sudden palps and chest pressure accompanied with SOB and nausea. He tried to go to sleep and when it didn't subside, he presented to local ED. Was found to be in afib w/ rates in 140s. He was started on diltiazem gtt and eventually converted to SR this AM. On my exam, patient reports resolution of symptoms. He has had one episode like this before a few years ago, but it was brief and he did not see a healthcare provider. Had LHC in 2014 that showed mild nonobstructive disease. No current chest pain. He is always SOA due to his COPD and is at his baseline. At times having palpitations and skipped beats but never been documented with Afib prior to this admission.     Past Medical History:   Diagnosis Date   • Antral gastritis    • Borderline diabetes mellitus    • Chest pain    • COPD (chronic obstructive pulmonary disease)    • Coronary artery disease    • Diabetes mellitus    • Duodenitis    • Emphysema of lung    • History of EKG 03/28/2016    NORMAL   • Hyperlipidemia    • Hypertension    • Kidney stone    • Obesity    • Palpitations    • Reflux esophagitis    • Renal failure     MILD         Past Surgical History:   Procedure Laterality Date   • CARDIAC CATHETERIZATION  11/01/1999   • CARDIOVASCULAR STRESS TEST  09/2012   • ECHO - CONVERTED  09/2012   • KIDNEY STONE SURGERY     • UPPER GASTROINTESTINAL ENDOSCOPY  08/30/2012       Prescriptions Prior to Admission   Medication Sig Dispense Refill Last Dose   • albuterol (PROVENTIL HFA;VENTOLIN HFA) 108 (90 BASE) MCG/ACT  inhaler Inhale 2 puffs. EVERY 4-6 HOURS   Taking   • aspirin 81 MG tablet Take 81 mg by mouth daily.   Taking   • atenolol (TENORMIN) 25 MG tablet Take 25 mg by mouth daily.   Taking   • atorvastatin (LIPITOR) 10 MG tablet Take 5 mg by mouth daily.   Taking   • colchicine 0.6 MG tablet Take 1 tablet by mouth Daily. 90 tablet 2    • FLUoxetine (PROzac) 20 MG capsule Take 20 mg by mouth 3 (three) times a day.   Taking   • glipiZIDE (GLUCOTROL) 5 MG tablet Take 5 mg by mouth Daily.      • mometasone (ASMANEX) 220 MCG/INH inhaler Inhale 2 puffs 1 (one) time daily.   Taking   • montelukast (SINGULAIR) 10 MG tablet Take 10 mg by mouth daily.   Taking   • pantoprazole (PROTONIX) 40 MG EC tablet Take 40 mg by mouth 2 (two) times a day.   Taking   • raNITIdine (ZANTAC) 150 MG tablet Take 150 mg by mouth Every Night.   Taking   • ranolazine (RANEXA) 500 MG 12 hr tablet Take 500 mg by mouth 2 (Two) Times a Day.      • Tiotropium Bromide-Olodaterol 2.5-2.5 MCG/ACT aerosol solution Inhale 2 puffs Daily.   Taking   • zolpidem (AMBIEN) 5 MG tablet Take 1 tablet by mouth At Night As Needed for Sleep. 30 tablet 3    • allopurinol (ZYLOPRIM) 100 MG tablet take 1 tablet by mouth once daily  0 Taking   • HYDROcodone-acetaminophen (NORCO) 5-325 MG per tablet Take 1 tablet by mouth Every 6 (Six) Hours As Needed for Severe Pain (7-10). 10 tablet 0 Not Taking   • isosorbide mononitrate (IMDUR) 30 MG 24 hr tablet Take 1 tablet by mouth Daily. 90 tablet 2    • nitroglycerin (NITROSTAT) 0.4 MG SL tablet Place 0.4 mg under the tongue as needed for chest pain.   Taking   • polyethylene glycol (MIRALAX) packet Take 17 g by mouth daily.   Taking   • traZODone (DESYREL) 100 MG tablet take 1/2 tablet by mouth AT  BEDTIME FOR 1 WEEK THEN INCREASE TO 1 TABLET AT BEDTIME  0 Not Taking       Current Meds  Current Facility-Administered Medications on File Prior to Encounter   Medication Dose Route Frequency Provider Last Rate Last Dose   • [COMPLETED]  diltiazem (CARDIZEM) bolus from bag 1 mg/mL 10 mg  10 mg Intravenous Once Lizzy H NORBERT Sexton   10 mg at 10/22/17 0253   • [COMPLETED] enoxaparin (LOVENOX) syringe 100 mg  1 mg/kg Subcutaneous Once Lizzy H NORBERT Sexton   100 mg at 10/22/17 0257   • [COMPLETED] HYDROcodone-acetaminophen (NORCO) 5-325 MG per tablet 1 tablet  1 tablet Oral Once Tyree Carballo MD   1 tablet at 10/22/17 0524   • [COMPLETED] sodium chloride 0.9 % bolus 1,000 mL  1,000 mL Intravenous Once Lizzy H NORBERT Sexton   Stopped at 10/22/17 0400   • [COMPLETED] sodium chloride 0.9 % bolus 1,000 mL  1,000 mL Intravenous Once Lizzy H NORBERT Sexton   Stopped at 10/22/17 0844   • [DISCONTINUED] diltiaZEM (CARDIZEM) 100 mg/100 mL (1 mg/mL) NS infusion (ADV)  5-15 mg/hr Intravenous Continuous Lizzy H NORBERT Sexton   Stopped at 10/22/17 0849   • [DISCONTINUED] sodium chloride 0.9 % flush 10 mL  10 mL Intravenous PRN Lizzy H NORBERT Sexton         Current Outpatient Prescriptions on File Prior to Encounter   Medication Sig Dispense Refill   • albuterol (PROVENTIL HFA;VENTOLIN HFA) 108 (90 BASE) MCG/ACT inhaler Inhale 2 puffs. EVERY 4-6 HOURS     • aspirin 81 MG tablet Take 81 mg by mouth daily.     • atenolol (TENORMIN) 25 MG tablet Take 25 mg by mouth daily.     • atorvastatin (LIPITOR) 10 MG tablet Take 5 mg by mouth daily.     • colchicine 0.6 MG tablet Take 1 tablet by mouth Daily. 90 tablet 2   • FLUoxetine (PROzac) 20 MG capsule Take 20 mg by mouth 3 (three) times a day.     • mometasone (ASMANEX) 220 MCG/INH inhaler Inhale 2 puffs 1 (one) time daily.     • montelukast (SINGULAIR) 10 MG tablet Take 10 mg by mouth daily.     • pantoprazole (PROTONIX) 40 MG EC tablet Take 40 mg by mouth 2 (two) times a day.     • raNITIdine (ZANTAC) 150 MG tablet Take 150 mg by mouth Every Night.     • Tiotropium Bromide-Olodaterol 2.5-2.5 MCG/ACT aerosol solution Inhale 2 puffs Daily.     • zolpidem (AMBIEN) 5 MG tablet Take 1 tablet by mouth At Night As Needed for  Sleep. 30 tablet 3   • allopurinol (ZYLOPRIM) 100 MG tablet take 1 tablet by mouth once daily  0   • HYDROcodone-acetaminophen (NORCO) 5-325 MG per tablet Take 1 tablet by mouth Every 6 (Six) Hours As Needed for Severe Pain (7-10). 10 tablet 0   • isosorbide mononitrate (IMDUR) 30 MG 24 hr tablet Take 1 tablet by mouth Daily. 90 tablet 2   • nitroglycerin (NITROSTAT) 0.4 MG SL tablet Place 0.4 mg under the tongue as needed for chest pain.     • polyethylene glycol (MIRALAX) packet Take 17 g by mouth daily.     • traZODone (DESYREL) 100 MG tablet take 1/2 tablet by mouth AT  BEDTIME FOR 1 WEEK THEN INCREASE TO 1 TABLET AT BEDTIME  0         Social History     Social History   • Marital status:      Spouse name: N/A   • Number of children: N/A   • Years of education: N/A     Occupational History   • Not on file.     Social History Main Topics   • Smoking status: Never Smoker   • Smokeless tobacco: Never Used   • Alcohol use No      Comment: s/p 1975   • Drug use: No   • Sexual activity: Defer     Other Topics Concern   • Not on file     Social History Narrative   • No narrative on file       Family History   Problem Relation Age of Onset   • Heart attack Mother    • Heart disease Mother    • Stroke Mother    • Kidney disease Mother    • Heart failure Mother    • Lung disease Father    • Tuberculosis Father    • Diabetes Sister    • Heart attack Brother    • Heart failure Brother    • Heart disease Brother    • Diabetes Sister    • No Known Problems Brother    • No Known Problems Brother            REVIEW OF SYSTEMS:   14 point ROS was performed and is negative except as outlined in HPI           Objective:     Vitals:    10/22/17 1100   BP: 114/87   BP Location: Left arm   Patient Position: Lying   Pulse: 64   Resp: 18   Temp: 97.9 °F (36.6 °C)   TempSrc: Oral   SpO2: 97%   Weight: 230 lb 3.2 oz (104 kg)     Body mass index is 32.11 kg/(m^2).  Flowsheet Rows         First Filed Value    Admission Height       Admission Weight  230 lb 3.2 oz (104 kg) Documented at 10/22/2017 1100          General Appearance:    Alert, cooperative, in no acute distress   Head:    Normocephalic, without obvious abnormality, atraumatic   Eyes:            Lids and lashes normal, conjunctivae and sclerae normal, no   icterus, no pallor, corneas clear, PERRLA   Ears:    Ears appear intact with no abnormalities noted       Neck:   supple, trachea midline, no carotid bruit, no JVD       Lungs:     Clear to auscultation,respirations regular, even and unlabored    Heart:    Regular rhythm and normal rate, normal S1 and S2, no murmur, no gallop, no rub, no click   Chest Wall:    No abnormalities observed   Abdomen:     Normal bowel sounds, no masses, no organomegaly, soft        non-tender, non-distended, no guarding, no rebound  tenderness   Extremities:   Moves all extremities well, no edema, no cyanosis, no redness   Pulses:   Pulses palpable and equal bilaterally. Normal radial, carotid, femoral, dorsalis pedis and posterior tibial pulses bilaterally. Normal abdominal aorta   Skin:   No bleeding, bruising or rash       sychiatric:   Alert and oented x 3, normal mood and affect.   Lab Review:      Results Review:     I reviewed the patient's new clinical results.      Results from last 7 days  Lab Units 10/22/17  1059   WBC 10*3/mm3 5.09   HEMOGLOBIN g/dL 14.3   HEMATOCRIT % 42.8   PLATELETS 10*3/mm3 163       Results from last 7 days  Lab Units 10/22/17  1059 10/22/17  0300   SODIUM mmol/L 140 140   POTASSIUM mmol/L 4.6 3.9   CHLORIDE mmol/L 110* 107   CO2 mmol/L 24.0 25.3   BUN mg/dL 24* 30*   CREATININE mg/dL 1.40* 1.51*   CALCIUM mg/dL 9.0 9.7   BILIRUBIN mg/dL  --  0.4   ALK PHOS U/L  --  84   ALT (SGPT) U/L  --  33   AST (SGOT) U/L  --  32   GLUCOSE mg/dL 121* 166*       Results from last 7 days  Lab Units 10/22/17  1059   SODIUM mmol/L 140   POTASSIUM mmol/L 4.6   CHLORIDE mmol/L 110*   CO2 mmol/L 24.0   BUN mg/dL 24*   CREATININE mg/dL  1.40*   GLUCOSE mg/dL 121*   CALCIUM mg/dL 9.0       Results from last 7 days  Lab Units 10/22/17  0300   INR  0.92     No results found for: TROPONINT    Results from last 7 days  Lab Units 10/22/17  1059   TSH mIU/mL 2.184     EKG: sinus rhythm w/ no acute changes this AM.     I personally viewed and interpreted the patient's EKG/Telemetry data    Assessment:    Active Problems:    Hyperlipidemia    COPD (chronic obstructive pulmonary disease)    Essential hypertension    Gout without tophus    New onset a-fib    Atrial fibrillation with RVR    Angina pectoris    Nonobstructive atherosclerosis of coronary artery    CKD stage 3 secondary to diabetes    DM2 (diabetes mellitus, type 2)    Atrial fibrillation       Plan:       1. New Onset Atrial Fibrillation w/ RVR   - patient now back in SR. Discontinue cardizem gtt.   - will check echocardiogram and get stress test tomorrow AM   - if both return normal, will start Flecainide 50mg BID. Limited with AAD choices due to CKD of 1.4 today and in the past 1.6.   - Change Atenolol to Lopressor 25mg BID.   - Eliquis 5mg BID for CHADS-VASC of 3     2. Chest pain   - [facility] in 2014 with mild nonobstructive CAD. Will get Lexiscan tomorrow to rule out ischemia. NPO after midnight     3. CKD, stage 3. Cr today at 1.4    4. COPD     5. HTN     6. HLD       Scribed for Fidel Lozano DO by Darby Nascimento PA-C. 10/22/2017  1:10 PM      NORBERT Shrestha  [facility]  10/22/17  1:10 PM    STAFF:  Patient is a 66 yo male with a hx of DM 2, HTN, HLD, COPD, and CKD that presented to ED at [facility] early this AM with complaints of chest pain and palpitations. He was in his usual state of health last night until he developed sudden palps and chest pressure accompanied with SOB and nausea. He tried to go to sleep and when it didn't subside, he presented to local ED. Was found to be in afib w/ rates in 140s. He was started on diltiazem gtt and eventually converted to SR this  AM. On my exam, patient reports resolution of symptoms. He has had one episode like this before a few years ago, but it was brief and he did not see a healthcare provider. Had LHC in 2014 that showed mild nonobstructive disease. No current chest pain. He is always SOA due to his COPD and is at his baseline.     Social history surgical history and family history as noted above.    Original EKG atrial fibrillation with rapid ventricular rates with nonspecific ST/T changes    EKG now normal sinus rhythm heart rate 65 bpm QRS duration 76 msec.  ST T nonspecific changes.    Physical Exam   Constitutional: oriented to person, place, and time.  well-developed and well-nourished. No distress.   HENT: Normocephalic.   Eyes: Conjunctivae are normal. No scleral icterus.   Neck: Normal carotid pulses, no hepatojugular reflux and no JVD present. Carotid bruit is not present. No tracheal deviation, no edema and no erythema present. No thyromegaly present.   Cardiovascular: Normal rate, regular rhythm, S1 normal, S2 normal, normal heart sounds and intact distal pulses.   No extrasystoles are present. PMI is not displaced.  Exam reveals no gallop, no distant heart sounds and no friction rub.    No murmur heard.  Pulses:       Radial pulses are 2+ on the right side, and 2+ on the left side.       Dorsalis pedis pulses are 2+ on the right side, and 2+ on the left side.   Pulmonary/Chest: Effort normal and breath sounds normal. No respiratory distress. She has no decreased breath sounds.  no wheezes,  Rhonchi or rales.  no tenderness.   Abdominal: Soft. Bowel sounds are normal. She exhibits no distension and no mass. There is no hepatosplenomegaly. There is no tenderness. There is no rebound and no guarding.   Musculoskeletal:  exhibits no edema, tenderness or deformity.   Neurological: is alert and oriented to person, place, and time.   Skin: Skin is warm and dry. No rash noted. No diaphoretic. No cyanosis or erythema. No pallor.  Nails show no clubbing.   Psychiatric: Normal mood and affect.Speech is normal and behavior is normal.    IMPRESSION:  1.  New onset symptomatic atrial fibrillation with rapid ventricular rates.  Patient now back in normal sinus rhythm.  --> We'll stop Cardizem drip and change atenolol to Lopressor 25 mg twice a day.  --> Check echocardiogram and get stress test in the a.m.  If both are normal heart flecainide 50 mg twice a day.  Limited with antiarrhythmic drug choices due to the patient's history of chronic kidney disease with creatinine today at 1.4 however even in the past up to 1.6.  --> Start on Eliquis 5 mg BID CHADS VASC of 3    2.  Chest pain in the setting of atrial fibrillation with rapid ventricular rates.  Left heart catheterization in 2014 with mild nonobstructive coronary disease.  Stress test in the a.m. to rule out ischemia.  Nothing by mouth after midnight.    3.  Chronic kidney disease stage III today creatinine 1.4 however in the recent past up to 1.6.    4. COPD    5. HTN stable    6. HPL Lipid panel    I, Fidel Lozano, personally performed the services described in this documentation as scribed by the above named individual in my presence, and it is both accurate and complete.  10/22/2017  2:17 PM    Fidel Lozano,   2:17 PM  10/22/17

## 2017-10-22 NOTE — ED NOTES
JOHNNY consent for transfer signed by Pt and placed on chart.      Amairani Gaona RN  10/22/17 0614

## 2017-10-22 NOTE — ED NOTES
Jayne with [facility] called back, and states she moved some things around and can take the patient. Go ahead and call the transfer line.      Heather Symes  10/22/17 5110

## 2017-10-22 NOTE — ED NOTES
Pt alert and oriented, skin pwd, no respiratory distress. Pt remains on 2lpm nc, afib noted at a rate of 92. Pt complains of cp, rates pain 2/10. Ns bolus infusing at 999ml/hr, cardizem infusing at 5mg/hr. Pt and family waiting on [facility] for transport to [facility]. Fall precautions maintained.      Lashon Tao RN  10/22/17 0715

## 2017-10-22 NOTE — ED NOTES
Called [facility] spoke with Jayne to see if she has any beds. She said to give her a call back in 5 minutes.      Heather Symes  10/22/17 2673

## 2017-10-22 NOTE — ED NOTES
Report called to Bailey Bhatti RN. Pt is going to room 611 in the [facility].      Amairani Gaona RN  10/22/17 0606

## 2017-10-22 NOTE — ED NOTES
[facility] called back from [facility] and states it will be about 1.5 hours for the transfer. SAGAR Gaona RN is aware.      Heather Symes  10/22/17 0624

## 2017-10-22 NOTE — ED NOTES
Pt BP 75/50 manually. NORBERT Head notified with [facility] to begin 1,000ml NS bolus.      Amairani Gaona RN  10/22/17 0700

## 2017-10-22 NOTE — ED NOTES
Pt HR 98, Afib on the monitor noted. Pt states that he is feeling much better. Pt states that he continues to feel pressure in his chest. NORBERT Head notified.      Amairani Gaona RN  10/22/17 2898

## 2017-10-22 NOTE — ED NOTES
NORBERT Head to the bedside at this time. Provider aware of Pt  and no known previous history of Atrial Fibrillation.      Amairani Gaona RN  10/22/17 0305

## 2017-10-23 ENCOUNTER — APPOINTMENT (OUTPATIENT)
Dept: CARDIOLOGY | Facility: HOSPITAL | Age: 67
End: 2017-10-23

## 2017-10-23 VITALS
RESPIRATION RATE: 16 BRPM | HEART RATE: 73 BPM | BODY MASS INDEX: 32.23 KG/M2 | OXYGEN SATURATION: 98 % | SYSTOLIC BLOOD PRESSURE: 126 MMHG | WEIGHT: 230.2 LBS | DIASTOLIC BLOOD PRESSURE: 79 MMHG | TEMPERATURE: 97.8 F | HEIGHT: 71 IN

## 2017-10-23 LAB
ANION GAP SERPL CALCULATED.3IONS-SCNC: 5 MMOL/L (ref 3–11)
ARTICHOKE IGE QN: 88 MG/DL (ref 0–130)
BH CV ECHO MEAS - AO ROOT AREA (BSA CORRECTED): 1.6
BH CV ECHO MEAS - AO ROOT AREA: 9.9 CM^2
BH CV ECHO MEAS - AO ROOT DIAM: 3.5 CM
BH CV ECHO MEAS - BSA(HAYCOCK): 2.3 M^2
BH CV ECHO MEAS - BSA: 2.2 M^2
BH CV ECHO MEAS - BZI_BMI: 31.5 KILOGRAMS/M^2
BH CV ECHO MEAS - BZI_METRIC_HEIGHT: 180.3 CM
BH CV ECHO MEAS - BZI_METRIC_WEIGHT: 102.5 KG
BH CV ECHO MEAS - EDV(CUBED): 80 ML
BH CV ECHO MEAS - EDV(TEICH): 83.5 ML
BH CV ECHO MEAS - EF(CUBED): 56.8 %
BH CV ECHO MEAS - EF(TEICH): 48.8 %
BH CV ECHO MEAS - ESV(CUBED): 34.5 ML
BH CV ECHO MEAS - ESV(TEICH): 42.8 ML
BH CV ECHO MEAS - FS: 24.4 %
BH CV ECHO MEAS - IVS/LVPW: 0.97
BH CV ECHO MEAS - IVSD: 1.1 CM
BH CV ECHO MEAS - LA DIMENSION: 3.3 CM
BH CV ECHO MEAS - LA/AO: 0.93
BH CV ECHO MEAS - LV MASS(C)D: 177.1 GRAMS
BH CV ECHO MEAS - LV MASS(C)DI: 79.7 GRAMS/M^2
BH CV ECHO MEAS - LVIDD: 4.3 CM
BH CV ECHO MEAS - LVIDS: 3.3 CM
BH CV ECHO MEAS - LVPWD: 1.2 CM
BH CV ECHO MEAS - MV A MAX VEL: 42.3 CM/SEC
BH CV ECHO MEAS - MV DEC SLOPE: 356.5 CM/SEC^2
BH CV ECHO MEAS - MV DEC TIME: 0.23 SEC
BH CV ECHO MEAS - MV E MAX VEL: 69.1 CM/SEC
BH CV ECHO MEAS - MV E/A: 1.6
BH CV ECHO MEAS - MV P1/2T MAX VEL: 84.7 CM/SEC
BH CV ECHO MEAS - MV P1/2T: 69.6 MSEC
BH CV ECHO MEAS - MVA P1/2T LCG: 2.6 CM^2
BH CV ECHO MEAS - MVA(P1/2T): 3.2 CM^2
BH CV ECHO MEAS - PA ACC SLOPE: 511.3 CM/SEC^2
BH CV ECHO MEAS - PA ACC TIME: 0.12 SEC
BH CV ECHO MEAS - PA PR(ACCEL): 25.1 MMHG
BH CV ECHO MEAS - RV MAX PG: 0.59 MMHG
BH CV ECHO MEAS - RV V1 MAX: 38.6 CM/SEC
BH CV ECHO MEAS - SI(CUBED): 20.5 ML/M^2
BH CV ECHO MEAS - SI(TEICH): 18.3 ML/M^2
BH CV ECHO MEAS - SV(CUBED): 45.5 ML
BH CV ECHO MEAS - SV(TEICH): 40.7 ML
BH CV ECHO MEAS - TAPSE (>1.6): 1.7 CM2
BH CV STRESS BP STAGE 1: NORMAL
BH CV STRESS BP STAGE 2: NORMAL
BH CV STRESS BP STAGE 4: NORMAL
BH CV STRESS COMMENTS STAGE 1: NORMAL
BH CV STRESS DOSE REGADENOSON STAGE 1: 0.4
BH CV STRESS DURATION MIN STAGE 1: 0
BH CV STRESS DURATION MIN STAGE 2: 1
BH CV STRESS DURATION MIN STAGE 3: 1
BH CV STRESS DURATION MIN STAGE 4: 1
BH CV STRESS DURATION SEC STAGE 1: 15
BH CV STRESS DURATION SEC STAGE 2: 0
BH CV STRESS HR STAGE 1: 90
BH CV STRESS HR STAGE 2: 93
BH CV STRESS HR STAGE 3: 91
BH CV STRESS HR STAGE 4: 86
BH CV STRESS PROTOCOL 1: NORMAL
BH CV STRESS RECOVERY BP: NORMAL MMHG
BH CV STRESS RECOVERY HR: 82 BPM
BH CV STRESS STAGE 1: 1
BH CV STRESS STAGE 2: 2
BH CV STRESS STAGE 3: 3
BH CV STRESS STAGE 4: 4
BH CV XLRA - RV BASE: 3.8 CM
BH CV XLRA - RV LENGTH: 8.8 CM
BH CV XLRA - RV MID: 3.5 CM
BH CV XLRA - TDI S': 8.8 CM/SEC
BUN BLD-MCNC: 22 MG/DL (ref 9–23)
BUN/CREAT SERPL: 15.7 (ref 7–25)
CALCIUM SPEC-SCNC: 9.1 MG/DL (ref 8.7–10.4)
CHLORIDE SERPL-SCNC: 109 MMOL/L (ref 99–109)
CHOLEST SERPL-MCNC: 154 MG/DL (ref 0–200)
CO2 SERPL-SCNC: 25 MMOL/L (ref 20–31)
CREAT BLD-MCNC: 1.4 MG/DL (ref 0.6–1.3)
GFR SERPL CREATININE-BSD FRML MDRD: 50 ML/MIN/1.73
GLUCOSE BLD-MCNC: 111 MG/DL (ref 70–100)
GLUCOSE BLDC GLUCOMTR-MCNC: 109 MG/DL (ref 70–130)
GLUCOSE BLDC GLUCOMTR-MCNC: 124 MG/DL (ref 70–130)
HBA1C MFR BLD: 6 % (ref 4.8–5.6)
HDLC SERPL-MCNC: 33 MG/DL (ref 40–60)
LEFT ATRIUM VOLUME INDEX: 9.5 ML/M2
LEFT ATRIUM VOLUME: 21 CM3
LV EF 2D ECHO EST: 60 %
MAGNESIUM SERPL-MCNC: 2 MG/DL (ref 1.3–2.7)
MAXIMAL PREDICTED HEART RATE: 151 BPM
PERCENT MAX PREDICTED HR: 60.26 %
POTASSIUM BLD-SCNC: 4.5 MMOL/L (ref 3.5–5.5)
SODIUM BLD-SCNC: 139 MMOL/L (ref 132–146)
STRESS BASELINE BP: NORMAL MMHG
STRESS BASELINE HR: 61 BPM
STRESS PERCENT HR: 71 %
STRESS POST PEAK BP: NORMAL MMHG
STRESS POST PEAK HR: 91 BPM
STRESS TARGET HR: 128 BPM
TRIGL SERPL-MCNC: 262 MG/DL (ref 0–150)

## 2017-10-23 PROCEDURE — 94640 AIRWAY INHALATION TREATMENT: CPT

## 2017-10-23 PROCEDURE — 0 RUBIDIUM CHLORIDE: Performed by: INTERNAL MEDICINE

## 2017-10-23 PROCEDURE — 80061 LIPID PANEL: CPT | Performed by: INTERNAL MEDICINE

## 2017-10-23 PROCEDURE — 99239 HOSP IP/OBS DSCHRG MGMT >30: CPT | Performed by: INTERNAL MEDICINE

## 2017-10-23 PROCEDURE — 83735 ASSAY OF MAGNESIUM: CPT | Performed by: INTERNAL MEDICINE

## 2017-10-23 PROCEDURE — 99232 SBSQ HOSP IP/OBS MODERATE 35: CPT | Performed by: INTERNAL MEDICINE

## 2017-10-23 PROCEDURE — 25010000002 REGADENOSON 0.4 MG/5ML SOLUTION: Performed by: INTERNAL MEDICINE

## 2017-10-23 PROCEDURE — 78491 MYOCRD IMG PET 1STD RST/STRS: CPT

## 2017-10-23 PROCEDURE — 82962 GLUCOSE BLOOD TEST: CPT

## 2017-10-23 PROCEDURE — 78491 MYOCRD IMG PET 1STD RST/STRS: CPT | Performed by: INTERNAL MEDICINE

## 2017-10-23 PROCEDURE — 83036 HEMOGLOBIN GLYCOSYLATED A1C: CPT | Performed by: INTERNAL MEDICINE

## 2017-10-23 PROCEDURE — A9555 RB82 RUBIDIUM: HCPCS | Performed by: INTERNAL MEDICINE

## 2017-10-23 PROCEDURE — 93017 CV STRESS TEST TRACING ONLY: CPT

## 2017-10-23 PROCEDURE — 93018 CV STRESS TEST I&R ONLY: CPT | Performed by: INTERNAL MEDICINE

## 2017-10-23 PROCEDURE — 80048 BASIC METABOLIC PNL TOTAL CA: CPT | Performed by: INTERNAL MEDICINE

## 2017-10-23 RX ORDER — ISOSORBIDE MONONITRATE 30 MG/1
30 TABLET, EXTENDED RELEASE ORAL
Qty: 30 TABLET | Refills: 0 | Status: SHIPPED | OUTPATIENT
Start: 2017-10-23 | End: 2017-10-25 | Stop reason: SINTOL

## 2017-10-23 RX ORDER — FLECAINIDE ACETATE 50 MG/1
50 TABLET ORAL EVERY 12 HOURS SCHEDULED
Status: DISCONTINUED | OUTPATIENT
Start: 2017-10-23 | End: 2017-10-23 | Stop reason: HOSPADM

## 2017-10-23 RX ORDER — ISOSORBIDE MONONITRATE 30 MG/1
30 TABLET, EXTENDED RELEASE ORAL
Status: DISCONTINUED | OUTPATIENT
Start: 2017-10-23 | End: 2017-10-23 | Stop reason: HOSPADM

## 2017-10-23 RX ORDER — FLECAINIDE ACETATE 50 MG/1
50 TABLET ORAL EVERY 12 HOURS SCHEDULED
Qty: 60 TABLET | Refills: 0 | Status: SHIPPED | OUTPATIENT
Start: 2017-10-23 | End: 2019-04-03

## 2017-10-23 RX ADMIN — PANTOPRAZOLE SODIUM 40 MG: 40 TABLET, DELAYED RELEASE ORAL at 06:08

## 2017-10-23 RX ADMIN — ATORVASTATIN CALCIUM 5 MG: 10 TABLET, FILM COATED ORAL at 08:20

## 2017-10-23 RX ADMIN — RUBIDIUM CHLORIDE RB-82 1 DOSE: 150 INJECTION, SOLUTION INTRAVENOUS at 09:50

## 2017-10-23 RX ADMIN — APIXABAN 5 MG: 5 TABLET, FILM COATED ORAL at 08:20

## 2017-10-23 RX ADMIN — ISOSORBIDE MONONITRATE 30 MG: 30 TABLET, EXTENDED RELEASE ORAL at 13:41

## 2017-10-23 RX ADMIN — FLECAINIDE ACETATE 50 MG: 50 TABLET ORAL at 13:41

## 2017-10-23 RX ADMIN — BUDESONIDE 0.5 MG: 0.5 INHALANT RESPIRATORY (INHALATION) at 08:34

## 2017-10-23 RX ADMIN — REGADENOSON 0.4 MG: 0.08 INJECTION, SOLUTION INTRAVENOUS at 09:58

## 2017-10-23 RX ADMIN — ASPIRIN 81 MG 81 MG: 81 TABLET ORAL at 08:20

## 2017-10-23 RX ADMIN — FLUOXETINE HYDROCHLORIDE 40 MG: 20 CAPSULE ORAL at 08:20

## 2017-10-23 RX ADMIN — MONTELUKAST SODIUM 10 MG: 10 TABLET, FILM COATED ORAL at 08:20

## 2017-10-23 RX ADMIN — ALLOPURINOL 100 MG: 100 TABLET ORAL at 08:20

## 2017-10-23 RX ADMIN — POLYETHYLENE GLYCOL 3350 17 G: 17 POWDER, FOR SOLUTION ORAL at 08:21

## 2017-10-23 RX ADMIN — RANOLAZINE 500 MG: 500 TABLET, FILM COATED, EXTENDED RELEASE ORAL at 08:20

## 2017-10-23 RX ADMIN — RUBIDIUM CHLORIDE RB-82 1 DOSE: 150 INJECTION, SOLUTION INTRAVENOUS at 10:00

## 2017-10-23 NOTE — DISCHARGE SUMMARY
[facility] Medicine Services  DISCHARGE SUMMARY    Patient Name: Todd Phillips  : 1950  MRN: [id number]    Date of Admission: 10/22/2017  Date of Discharge:    Length of Stay: 1  Primary Care Physician: Adiel Denney MD    Consults     Date and Time Order Name Status Description    10/22/2017 1246 Inpatient Consult to Cardiology Completed           Hospital Course     Presenting Problem:   Atrial fibrillation [I48.91]  A-fib [I48.91]      Active Hospital Problems (** Indicates Principal Problem)    Diagnosis Date Noted   • **New onset a-fib [I48.91] 10/22/2017     Priority: High   • Atrial fibrillation with RVR [I48.91] 10/22/2017     Priority: High   • Angina pectoris [I20.9] 10/22/2017     Priority: High   • Nonobstructive atherosclerosis of coronary artery [I70.91] 10/22/2017     Priority: Medium   • CKD stage 3 secondary to diabetes [E11.22, N18.3] 10/22/2017   • DM2 (diabetes mellitus, type 2) [E11.9] 10/22/2017   • COPD (chronic obstructive pulmonary disease) [J44.9] 2016   • Gout without tophus [M10.9] 2016   • Essential hypertension [I10] 2016   • Hyperlipidemia [E78.5] 2016      Resolved Hospital Problems    Diagnosis Date Noted Date Resolved   No resolved problems to display.          Hospital Course:    Todd Phillips is a 67 y.o. male with history of non-obstructive CAD (heart cath in  with 20% lad dz, otherwise nonobstructive plaque noted), copd, ckd 3 (baseline cr 1.4-1.5), dm2, HL who follows with dr. maloney (cardiologist) in [place], [place]. Patient was in his usual state of health Saturday 10/21 evening when developed palpitations, accompanying retrosternal chest pressure (8/10 intensity) with no radiation, + accompanying nausea (no emesis) and dyspnea. At local ER, noted to be in Atrial fibrillation with rate in 140's, troponin normal (as was a second set), started on diltiazem with cessation of symptoms. No preceding fever, no  cough.  Apparently there were no beds available locally so ultimately they contacted [facility] hospitalist who accepted patient in transfer.   During hospital stay here at [facility], patient had no chest pain, and shortly after arrival converted to sinus rhythm and remained in sinus rhythm. Diltiazem drip was discontinued. Switched to oral beta blocker, started on eliquis. TSH normal. Echo normal (see below for official read). Cardiology consulted, dr. Luna of electrophysiology/cardiology saw patient during the say. Stress test performed (see below for official read) which showed no reversible ischemia. I spoke with dr. Luna directly. He recommends flecainide 50mg po bid, metoprolol tartated 25mg po bid, eliquis 5mg po bid, imdur 30mg po daily. He recommends stopping ranexa. Dr. Luna has ordered ekg for this Wednesday and provided script for this to be sent to his office for review. Follow up pcp 1 week. Follow up dr. Luna 4 weeks. Patient and wife appreciative of care received and voiced understanding. I also mentioned that, as now on eliquis, he should avoid situations in which trauma/hemorrhage risk might be increased.           Day of Discharge     HPI:   Today no chest pain, sinus rhythm overnight. No palpitations, no dyspnea. Underwent stress test this morning, eager for discharge home. Has seen dr. luna of cardiology today and says ready for discharge home    Review of Systems  No f/c, no headache, no chest pain, no palpitations.    Otherwise complete ROS is negative except as mentioned in the HPI.      Vital Signs: Temp:  [97.4 °F (36.3 °C)-98 °F (36.7 °C)] 97.8 °F (36.6 °C)  Heart Rate:  [54-73] 73  Resp:  [15-18] 16  BP: (105-126)/(62-82) 126/79     Physical Exam:  Constitutional: No acute distress, awake, alert  HENT: NCAT, mucous membranes moist  Respiratory: Clear to auscultation bilaterally, respiratory effort normal   Cardiovascular: RRR, no murmurs, rubs, or gallops,  palpable pedal pulses bilaterally  Gastrointestinal: Positive bowel sounds, soft, nontender, nondistended  Musculoskeletal: No bilateral ankle edema  Psychiatric: Oriented x 3, appropriate affect, cooperative  Neurologic: Strength symmetric in all extremities, CN grossly intact to confrontation, speech is clear  Skin: No rashes      Pertinent  and/or Most Recent Results         Results from last 7 days  Lab Units 10/23/17  0508 10/22/17  1059 10/22/17  0300   WBC 10*3/mm3  --  5.09 5.96   HEMOGLOBIN g/dL  --  14.3 14.5   HEMATOCRIT %  --  42.8 43.3   PLATELETS 10*3/mm3  --  163 175   SODIUM mmol/L 139 140 140   POTASSIUM mmol/L 4.5 4.6 3.9   CHLORIDE mmol/L 109 110* 107   CO2 mmol/L 25.0 24.0 25.3   BUN mg/dL 22 24* 30*   CREATININE mg/dL 1.40* 1.40* 1.51*   GLUCOSE mg/dL 111* 121* 166*   CALCIUM mg/dL 9.1 9.0 9.7       Results from last 7 days  Lab Units 10/22/17  0300   BILIRUBIN mg/dL 0.4   ALK PHOS U/L 84   ALT (SGPT) U/L 33   AST (SGOT) U/L 32   PROTIME Seconds 12.4   INR  0.92   APTT seconds 26.3      Results from last 7 days  Lab Units 10/23/17  0508   CHOLESTEROL mg/dL 154   TRIGLYCERIDES mg/dL 262*   HDL CHOL mg/dL 33*   LDL CHOL mg/dL 88       Results from last 7 days  Lab Units 10/23/17  0508 10/22/17  1059 10/22/17  0557 10/22/17  0300   TSH mIU/mL  --  2.184  --   --    HEMOGLOBIN A1C % 6.00*  --   --   --    TROPONIN I ng/mL  --  0.018 0.008 <0.006     Brief Urine Lab Results  (Last result in the past 365 days)      Color   Clarity   Blood   Leuk Est   Nitrite   Protein   CREAT   Urine HCG        10/22/17 0342 Yellow Clear Negative Negative Negative Negative                    Imaging Results (all)     None          Results for orders placed during the hospital encounter of 10/22/17   Adult Transthoracic Echo Complete W/ Cont if Necessary Per Protocol    Narrative · Left ventricular systolic function is normal. Estimated EF = 60%.  · The cardiac valves are anatomically and functionally normal.           Myocardial perfusion Stress Test 10/23/17:    Interpretation Summary   · When the stress and rest PET images were compared no areas of fixed hypoperfusion or stress-induced hypoperfusion were seen. The 3-D reconstruction showed normal contractility in all segments with a calculated ejection fraction of 65% at rest and 68% with stress. No LV dilation was seen with stress..  · No evidence of a deep inducible ischemia by scintigraphic criteria.           Discharge Details      Todd Phillips   Home Medication Instructions REBECCA:[id number]    Printed on:10/23/17 4389   Medication Information                      albuterol (PROVENTIL HFA;VENTOLIN HFA) 108 (90 BASE) MCG/ACT inhaler  Inhale 2 puffs. EVERY 4-6 HOURS             allopurinol (ZYLOPRIM) 100 MG tablet  take 1 tablet by mouth once daily             apixaban (ELIQUIS) 5 MG tablet tablet  Take 1 tablet by mouth Every 12 (Twelve) Hours.             aspirin 81 MG tablet  Take 81 mg by mouth daily.             atorvastatin (LIPITOR) 10 MG tablet  Take 5 mg by mouth daily.             colchicine 0.6 MG tablet  Take 1 tablet by mouth Daily.             flecainide (TAMBOCOR) 50 MG tablet  Take 1 tablet by mouth Every 12 (Twelve) Hours.             FLUoxetine (PROzac) 20 MG capsule  Take 20 mg by mouth 3 (three) times a day.             glipiZIDE (GLUCOTROL) 5 MG tablet  Take 5 mg by mouth Daily.             HYDROcodone-acetaminophen (NORCO) 5-325 MG per tablet  Take 1 tablet by mouth Every 6 (Six) Hours As Needed for Severe Pain (7-10).             isosorbide mononitrate (IMDUR) 30 MG 24 hr tablet  Take 1 tablet by mouth Daily.             metoprolol tartrate (LOPRESSOR) 25 MG tablet  Take 1 tablet by mouth Every 12 (Twelve) Hours.             mometasone (ASMANEX) 220 MCG/INH inhaler  Inhale 2 puffs 1 (one) time daily.             montelukast (SINGULAIR) 10 MG tablet  Take 10 mg by mouth daily.             nitroglycerin (NITROSTAT) 0.4 MG SL tablet  Place 0.4 mg  under the tongue as needed for chest pain.             pantoprazole (PROTONIX) 40 MG EC tablet  Take 40 mg by mouth 2 (two) times a day.             polyethylene glycol (MIRALAX) packet  Take 17 g by mouth daily.             Tiotropium Bromide-Olodaterol 2.5-2.5 MCG/ACT aerosol solution  Inhale 2 puffs Daily.             traZODone (DESYREL) 100 MG tablet  take 1/2 tablet by mouth AT  BEDTIME FOR 1 WEEK THEN INCREASE TO 1 TABLET AT BEDTIME                   Discharge Disposition:  Home or Self Care    Discharge Diet: cardiac      Discharge Activity: ad anthony        Future Appointments  Date Time Provider Department Center   10/25/2017 9:00 AM Adiel Denney MD [facility] None       Additional Instructions for the Follow-ups that You Need to Schedule     Discharge Follow-up with PCP    As directed    Follow Up Details:  1 week       Discharge Follow-up with Specialty    As directed    Specialty:  dr. luna ([facility] cardiology/EP)   Follow Up:  1 Month                   Time Spent on Discharge:  35 minutes spent on discharge    Jono Cruz MD  10/23/17  1:44 PM

## 2017-10-23 NOTE — PROGRESS NOTES
Discharge Planning Assessment  [facility]     Patient Name: Todd Phillips  MRN: [id number]  Today's Date: 10/23/2017    Admit Date: 10/22/2017          Discharge Needs Assessment       10/23/17 1528    Living Environment    Lives With spouse    Living Arrangements house    Home Accessibility no concerns    Stair Railings at Home none    Type of Financial/Environmental Concern none    Transportation Available car;family or friend will provide    Living Environment    Provides Primary Care For no one    Quality Of Family Relationships supportive    Able to Return to Prior Living Arrangements yes    Discharge Needs Assessment    Concerns To Be Addressed discharge planning concerns    Readmission Within The Last 30 Days no previous admission in last 30 days    Anticipated Changes Related to Illness none    Equipment Currently Used at Home none    Equipment Needed After Discharge none    Discharge Facility/Level Of Care Needs home with home health    Discharge Disposition still a patient            Discharge Plan       10/23/17 1392    Case Management/Social Work Plan    Plan Home    Additional Comments Met with Mr. Phillips and his wife Emmy, he reports having a st. cane, glucometer, and cpap. He denies having any  services.  His plan is to return home with his wife, no needs identified. They report copays and medications are affordable. CM will cont to follow.         Discharge Placement     No information found        Expected Discharge Date and Time     Expected Discharge Date Expected Discharge Time    Oct 23, 2017               Demographic Summary       10/23/17 1524    Referral Information    Admission Type inpatient    Arrived From home or self-care    Referral Source admission list    Reason For Consult discharge planning    Record Reviewed medical record            Functional Status       10/23/17 1528    IADL    Medications independent    Meal Preparation independent    Housekeeping independent    Laundry  independent    Shopping independent    Oral Care independent    Activity Tolerance    Current Activity Limitations none    Usual Activity Tolerance good    Current Activity Tolerance good      10/23/17 1526    Functional Status Current    Current Functional Level Comment see nursing notes    Functional Status Prior    Ambulation 0-->independent    Transferring 0-->independent    Toileting 0-->independent    Bathing 0-->independent    Dressing 0-->independent    Eating 0-->independent    Communication 0-->understands/communicates without difficulty    Swallowing 0-->swallows foods/liquids without difficulty            Psychosocial     None            Abuse/Neglect     None            Legal     None            Substance Abuse     None            Patient Forms     None          Digna Orona, RN

## 2017-10-23 NOTE — PLAN OF CARE
Problem: Patient Care Overview (Adult)  Goal: Plan of Care Review  Outcome: Ongoing (interventions implemented as appropriate)    10/23/17 0452   Coping/Psychosocial Response Interventions   Plan Of Care Reviewed With patient;spouse   Patient Care Overview   Progress no change   Outcome Evaluation   Outcome Summary/Follow up Plan Pt. was pleasant and cooperative during the evening; denied current CP or SOB; SR on the monitor; NPO since midnight for stress test; VSS; continue to monitor.

## 2017-10-23 NOTE — PROGRESS NOTES
[facility]  Cardiovascular Consultation Note  Todd Phillips  [id number]  [id number]  1950    DATE OF ADMISSION: 10/22/2017  DATE OF FOLLOW UP:  10/23/2017    Adiel Denney MD    Subjective:     Patient ID: Todd Phillips is a 67 y.o. male.    Chief Complaint: Afib      Allergies   Allergen Reactions   • Morphine And Related      Morphine caused pt to isaac       Current Facility-Administered Medications:   •  allopurinol (ZYLOPRIM) tablet 100 mg, 100 mg, Oral, Daily, Jono Cruz MD, 100 mg at 10/23/17 0820  •  apixaban (ELIQUIS) tablet 5 mg, 5 mg, Oral, Q12H, NORBERT Matias, 5 mg at 10/23/17 0820  •  aspirin chewable tablet 81 mg, 81 mg, Oral, Daily, Jono Cruz MD, 81 mg at 10/23/17 0820  •  atorvastatin (LIPITOR) tablet 5 mg, 5 mg, Oral, Daily, Jono Cruz MD, 5 mg at 10/23/17 0820  •  budesonide (PULMICORT) nebulizer solution 0.5 mg, 0.5 mg, Nebulization, BID - RT, Jono Cruz MD, 0.5 mg at 10/23/17 0834  •  dextrose (D50W) solution 25 g, 25 g, Intravenous, Q15 Min PRN, Jono Cruz MD  •  dextrose (GLUTOSE) oral gel 15 g, 15 g, Oral, Q15 Min PRN, Jono Cruz MD  •  FLUoxetine (PROzac) capsule 40 mg, 40 mg, Oral, Daily, Jono Cruz MD, 40 mg at 10/23/17 0820  •  glucagon (GLUCAGEN) injection 1 mg, 1 mg, Subcutaneous, Q15 Min PRN, Jono Cruz MD  •  HYDROcodone-acetaminophen (NORCO) 5-325 MG per tablet 1 tablet, 1 tablet, Oral, Q6H PRN, Jono Cruz MD  •  influenza vac split quad (FLUZONE,FLUARIX,AFLURIA) injection 0.5 mL, 0.5 mL, Intramuscular, During Hospitalization, Simone Burns MD  •  insulin lispro (humaLOG) injection 0-7 Units, 0-7 Units, Subcutaneous, 4x Daily With Meals & Nightly, Jono Cruz MD  •  metoprolol tartrate (LOPRESSOR) tablet 25 mg, 25 mg, Oral, Q12H, NORBERT Matias, Stopped at 10/23/17 0826  •  montelukast (SINGULAIR) tablet 10 mg, 10 mg, Oral, Daily, Jono  CONSTNATIN Cruz MD, 10 mg at 10/23/17 0820  •  nitroglycerin (NITROSTAT) SL tablet 0.4 mg, 0.4 mg, Sublingual, PRN, Jono Cruz MD  •  ondansetron (ZOFRAN) tablet 4 mg, 4 mg, Oral, Q6H PRN **OR** ondansetron (ZOFRAN) injection 4 mg, 4 mg, Intravenous, Q6H PRN, Jono Cruz MD  •  pantoprazole (PROTONIX) EC tablet 40 mg, 40 mg, Oral, Q AM, Jono Cruz MD, 40 mg at 10/23/17 0608  •  polyethylene glycol (MIRALAX) powder 17 g, 17 g, Oral, Daily, Jono Cruz MD, 17 g at 10/23/17 0821  •  ranolazine (RANEXA) 12 hr tablet 500 mg, 500 mg, Oral, BID, Jono Cruz MD, 500 mg at 10/23/17 0820  •  sodium chloride 0.9 % flush 1-10 mL, 1-10 mL, Intravenous, PRN, Jono Cruz MD    History of Present Illness  Patient doing okay today.  No major issues.  Stress test and echocardiogram completed which both were normal with no signs of ischemia.    The following portions of the patient's history were reviewed and updated as appropriate: allergies, current medications, past family history, past medical history, past social history, past surgical history and problem list.    ROS   14 point ROS negative except as outlined in problem list, HPI and other parts of the note.    Procedures       Objective:       Vitals:    10/23/17 0428 10/23/17 0708 10/23/17 0710 10/23/17 0834   BP: 110/80 126/79     BP Location: Left arm      Patient Position: Lying      Pulse:   62 73   Resp: 18  16 16   Temp: 97.5 °F (36.4 °C)  97.8 °F (36.6 °C)    TempSrc: Oral  Oral    SpO2:       Weight:       Height:         No intake or output data in the 24 hours ending 10/23/17 1301    GENERAL: Well-developed, well-nourished patient in no acute distress.  HEENT: Normocephalic, atraumatic, PERRLA. Moist mucous membranes.  NECK: No JVD present at 30°. No carotid bruits auscultated.  LUNGS: Clear to auscultation.  CARDIOVASCULAR: Heart has a regular rate and rhythm. No murmurs, gallops or rubs noted.   ABDOMEN: Soft, nontender.  Positive bowel sounds.  MUSCULOSKELETAL: No gross deformities. No clubbing, cyanosis  EXT: pulses intact, no swelling  SKIN: Pink, warm  Neuro: Nonfocal exam. Gait intact    The patient's old records including ambulatory rhythm recordings (ECGs, Holter/event monitor) were reviewed and discussed.      Lab Review:     Results from last 7 days  Lab Units 10/23/17  0508 10/22/17  1059 10/22/17  0300   SODIUM mmol/L 139 140 140   POTASSIUM mmol/L 4.5 4.6 3.9   CHLORIDE mmol/L 109 110* 107   CO2 mmol/L 25.0 24.0 25.3   BUN mg/dL 22 24* 30*   CREATININE mg/dL 1.40* 1.40* 1.51*   GLUCOSE mg/dL 111* 121* 166*   CALCIUM mg/dL 9.1 9.0 9.7       Results from last 7 days  Lab Units 10/22/17  1059 10/22/17  0557 10/22/17  0300   CK TOTAL U/L  --   --  151   TROPONIN I ng/mL 0.018 0.008 <0.006   CK MB INDEX %  --   --  1.3       Results from last 7 days  Lab Units 10/22/17  1059 10/22/17  0300   WBC 10*3/mm3 5.09 5.96   HEMOGLOBIN g/dL 14.3 14.5   HEMATOCRIT % 42.8 43.3   PLATELETS 10*3/mm3 163 175       Results from last 7 days  Lab Units 10/22/17  0300   INR  0.92   APTT seconds 26.3       Results from last 7 days  Lab Units 10/23/17  0508   CHOLESTEROL mg/dL 154       Results from last 7 days  Lab Units 10/23/17  0508   MAGNESIUM mg/dL 2.0       Results from last 7 days  Lab Units 10/23/17  0508   CHOLESTEROL mg/dL 154   TRIGLYCERIDES mg/dL 262*   HDL CHOL mg/dL 33*     TELE: NSR    Echo   EF 60%, no sig VHD    STRESS  · When the stress and rest PET images were compared no areas of fixed hypoperfusion or stress-induced hypoperfusion were seen. The 3-D reconstruction showed normal contractility in all segments with a calculated ejection fraction of 65% at rest and 68% with stress. No LV dilation was seen with stress..  · No evidence of a deep inducible ischemia by scintigraphic criteria.        Assessment & Plan:   1.  Onset symptomatic atrial fibrillation with rapid ventricular rates now back in normal sinus rhythm.  Continue  on Lopressor 25 mg twice a day and start flecainide 50 mg twice a day.  Also continuing on Eliquis.  Stress test and echocardiogram today normal with normal ejection fraction and no signs of ischemia noted.  2.  Chest pain is setting of atrial fibrillation with rapid ventricular rates with a stress test today showing no signs of ischemia.  Previous left heart catheterization with mild coronary disease in 2014.  Likely chest pain secondary to rapid rates.  3.  COPD  4.  HTN stable    --> Okay per cardiac standpoint to be discharged home.  We'll continue on Lopressor 25 mg twice a day and Eliquis 5 mg twice a day.  Added on flecainide 50 mg twice a day.  Also, would stop Ranexa and continue on Imdur.  He is to check an EKG on Wednesday and send our office.  He will follow-up with Dr. Rosales in 4-6 weeks.        Fidel Lozano,   10/23/17  1:01 PM        EMR Dragon/Transcription disclaimer:  Much of this encounter note is an electronic transcription/translation of spoken language to printed text. Electronic translation of spoken language may permit erroneous, or at times, nonsensical words or phrases to be inadvertently transcribed. Although I have reviewed the note for such errors, some may still exist.

## 2017-10-24 ENCOUNTER — LAB (OUTPATIENT)
Dept: LAB | Facility: HOSPITAL | Age: 67
End: 2017-10-24
Attending: INTERNAL MEDICINE

## 2017-10-24 DIAGNOSIS — N18.2 CHRONIC RENAL FAILURE, STAGE 2 (MILD): ICD-10-CM

## 2017-10-24 LAB
ANION GAP SERPL CALCULATED.3IONS-SCNC: 5.6 MMOL/L (ref 3.6–11.2)
BUN BLD-MCNC: 22 MG/DL (ref 7–21)
BUN/CREAT SERPL: 13.7 (ref 7–25)
CALCIUM SPEC-SCNC: 10 MG/DL (ref 7.7–10)
CHLORIDE SERPL-SCNC: 107 MMOL/L (ref 99–112)
CO2 SERPL-SCNC: 25.4 MMOL/L (ref 24.3–31.9)
CREAT BLD-MCNC: 1.61 MG/DL (ref 0.43–1.29)
GFR SERPL CREATININE-BSD FRML MDRD: 43 ML/MIN/1.73
GLUCOSE BLD-MCNC: 79 MG/DL (ref 70–110)
OSMOLALITY SERPL CALC.SUM OF ELEC: 277.9 MOSM/KG (ref 273–305)
POTASSIUM BLD-SCNC: 4.7 MMOL/L (ref 3.5–5.3)
SODIUM BLD-SCNC: 138 MMOL/L (ref 135–153)

## 2017-10-24 PROCEDURE — 80048 BASIC METABOLIC PNL TOTAL CA: CPT | Performed by: INTERNAL MEDICINE

## 2017-10-25 ENCOUNTER — OFFICE VISIT (OUTPATIENT)
Dept: CARDIOLOGY | Facility: CLINIC | Age: 67
End: 2017-10-25

## 2017-10-25 VITALS
OXYGEN SATURATION: 98 % | WEIGHT: 227.8 LBS | BODY MASS INDEX: 31.89 KG/M2 | HEIGHT: 71 IN | DIASTOLIC BLOOD PRESSURE: 96 MMHG | TEMPERATURE: 97.3 F | HEART RATE: 70 BPM | SYSTOLIC BLOOD PRESSURE: 144 MMHG

## 2017-10-25 DIAGNOSIS — I10 ESSENTIAL HYPERTENSION: ICD-10-CM

## 2017-10-25 DIAGNOSIS — I48.91 ATRIAL FIBRILLATION, UNSPECIFIED TYPE (HCC): Primary | ICD-10-CM

## 2017-10-25 DIAGNOSIS — Z23 IMMUNIZATION DUE: ICD-10-CM

## 2017-10-25 DIAGNOSIS — N18.2 CHRONIC RENAL FAILURE, STAGE 2 (MILD): ICD-10-CM

## 2017-10-25 DIAGNOSIS — E11.9 TYPE 2 DIABETES MELLITUS WITHOUT COMPLICATION, WITHOUT LONG-TERM CURRENT USE OF INSULIN (HCC): ICD-10-CM

## 2017-10-25 DIAGNOSIS — R00.2 PALPITATIONS: ICD-10-CM

## 2017-10-25 DIAGNOSIS — J44.9 CHRONIC OBSTRUCTIVE PULMONARY DISEASE, UNSPECIFIED COPD TYPE (HCC): ICD-10-CM

## 2017-10-25 DIAGNOSIS — Z79.01 CHRONIC ANTICOAGULATION: ICD-10-CM

## 2017-10-25 PROCEDURE — G0008 ADMIN INFLUENZA VIRUS VAC: HCPCS | Performed by: INTERNAL MEDICINE

## 2017-10-25 PROCEDURE — 93000 ELECTROCARDIOGRAM COMPLETE: CPT | Performed by: INTERNAL MEDICINE

## 2017-10-25 PROCEDURE — 90662 IIV NO PRSV INCREASED AG IM: CPT | Performed by: INTERNAL MEDICINE

## 2017-10-25 PROCEDURE — 99214 OFFICE O/P EST MOD 30 MIN: CPT | Performed by: INTERNAL MEDICINE

## 2017-10-25 RX ORDER — METOPROLOL TARTRATE 50 MG/1
50 TABLET, FILM COATED ORAL 2 TIMES DAILY
Qty: 180 TABLET | Refills: 3 | Status: SHIPPED | OUTPATIENT
Start: 2017-10-25 | End: 2018-05-01

## 2017-10-25 NOTE — PROGRESS NOTES
subjective     Chief Complaint   Patient presents with   • Follow-up   • Atrial Fibrillation   • Hypertension   • Hyperlipidemia     History of Present Illness  Patient is 67 years old white male who states that his blood pressure has been running high and heart has been eating little bit faster.  Patient recently had atrial fibrillation with rapid ventricular response.  He went to [facility] in [place]..  He was started on Tambocor therapy.  He also had exercise PET scan which was negative for significant exercise-induced myocardial ischemia.    Patient has chads VASC 3.  He has been taking eliquis 5 mg twice a day.  No side effects no bleeding episodes.    Patient patient has a chronic renal failure creatinine has been running around 1.4-1.6.  That makes antiarrhythmic choice difficult.    Hyperlipidemia apparently has been quite well controlled with Lipitor.  Patient to has no side effects.  Other problems consist of mild COPD, hyperuricemia anxiety and depression, diabetes mellitus and gastroesophageal reflux disorder.  Overall he seems to be doing fairly well.    Past Surgical History:   Procedure Laterality Date   • CARDIAC CATHETERIZATION  11/01/1999   • CARDIOVASCULAR STRESS TEST  09/2012   • ECHO - CONVERTED  09/2012   • KIDNEY STONE SURGERY     • UPPER GASTROINTESTINAL ENDOSCOPY  08/30/2012     Family History   Problem Relation Age of Onset   • Heart attack Mother    • Heart disease Mother    • Stroke Mother    • Kidney disease Mother    • Heart failure Mother    • Lung disease Father    • Tuberculosis Father    • Diabetes Sister    • Heart attack Brother    • Heart failure Brother    • Heart disease Brother    • Diabetes Sister    • No Known Problems Brother    • No Known Problems Brother      Past Medical History:   Diagnosis Date   • Abnormal ECG    • Antral gastritis    • Atrial fibrillation    • Borderline diabetes mellitus    • Chest pain    • COPD (chronic obstructive pulmonary disease)    •  Coronary artery disease    • Diabetes mellitus    • Duodenitis    • Emphysema of lung    • History of EKG 03/28/2016    NORMAL   • Hyperlipidemia    • Hypertension    • Kidney stone    • Obesity    • Palpitations    • Reflux esophagitis    • Renal failure     MILD     Patient Active Problem List   Diagnosis   • Palpitations   • Chest pain, with typical and atypical features   • Hyperlipidemia   • COPD (chronic obstructive pulmonary disease)   • Essential hypertension   • Chronic renal failure, stage 2 (mild)   • Borderline diabetes mellitus   • Gout without tophus   • Anxiety and depression   • Primary insomnia   • Gastroesophageal reflux disease without esophagitis   • Lymphadenopathy, submandibular   • New onset a-fib   • Atrial fibrillation with RVR   • Angina pectoris   • Nonobstructive atherosclerosis of coronary artery   • CKD stage 3 secondary to diabetes   • DM2 (diabetes mellitus, type 2)   • Atrial fibrillation   • A-fib   • Chronic anticoagulation, eliquis       Social History   Substance Use Topics   • Smoking status: Never Smoker   • Smokeless tobacco: Never Used   • Alcohol use No      Comment: s/p 1975       Allergies   Allergen Reactions   • Morphine And Related      Morphine caused pt to isaac       Current Outpatient Prescriptions on File Prior to Visit   Medication Sig   • albuterol (PROVENTIL HFA;VENTOLIN HFA) 108 (90 BASE) MCG/ACT inhaler Inhale 2 puffs. EVERY 4-6 HOURS   • apixaban (ELIQUIS) 5 MG tablet tablet Take 1 tablet by mouth Every 12 (Twelve) Hours.   • aspirin 81 MG tablet Take 81 mg by mouth daily.   • atorvastatin (LIPITOR) 10 MG tablet Take 5 mg by mouth daily.   • colchicine 0.6 MG tablet Take 1 tablet by mouth Daily.   • flecainide (TAMBOCOR) 50 MG tablet Take 1 tablet by mouth Every 12 (Twelve) Hours.   • FLUoxetine (PROzac) 20 MG capsule Take 20 mg by mouth 3 (three) times a day.   • glipiZIDE (GLUCOTROL) 5 MG tablet Take 5 mg by mouth Daily.   • mometasone (ASMANEX) 220 MCG/INH  "inhaler Inhale 2 puffs 1 (one) time daily.   • montelukast (SINGULAIR) 10 MG tablet Take 10 mg by mouth daily.   • nitroglycerin (NITROSTAT) 0.4 MG SL tablet Place 0.4 mg under the tongue as needed for chest pain.   • pantoprazole (PROTONIX) 40 MG EC tablet Take 40 mg by mouth 2 (two) times a day.   • polyethylene glycol (MIRALAX) packet Take 17 g by mouth daily.   • Tiotropium Bromide-Olodaterol 2.5-2.5 MCG/ACT aerosol solution Inhale 2 puffs Daily.   • allopurinol (ZYLOPRIM) 100 MG tablet take 1 tablet by mouth once daily     No current facility-administered medications on file prior to visit.          The following portions of the patient's history were reviewed and updated as appropriate: allergies, current medications, past family history, past medical history, past social history, past surgical history and problem list.    Review of Systems   Constitution: Negative.   HENT: Negative.  Negative for congestion.    Eyes: Negative.    Cardiovascular: Negative.  Negative for chest pain, cyanosis, dyspnea on exertion, irregular heartbeat, leg swelling, near-syncope, orthopnea, palpitations, paroxysmal nocturnal dyspnea and syncope.   Respiratory: Negative.  Negative for shortness of breath.    Hematologic/Lymphatic: Negative.    Skin: Negative.    Musculoskeletal: Negative.    Gastrointestinal: Negative.    Genitourinary: Negative.    Neurological: Negative.  Negative for headaches.   Psychiatric/Behavioral: Negative.           Objective:     /96 (BP Location: Left arm, Patient Position: Sitting)  Pulse 70  Temp 97.3 °F (36.3 °C)  Ht 71\" (180.3 cm)  Wt 227 lb 12.8 oz (103 kg)  SpO2 98%  BMI 31.77 kg/m2  Physical Exam   Constitutional: He appears well-developed and well-nourished. No distress.   HENT:   Head: Normocephalic and atraumatic.   Mouth/Throat: Oropharynx is clear and moist. No oropharyngeal exudate.   Eyes: Conjunctivae and EOM are normal. Pupils are equal, round, and reactive to light. No " scleral icterus.   Neck: Normal range of motion. Neck supple. No JVD present. No tracheal deviation present. No thyromegaly present.   Cardiovascular: Normal rate, regular rhythm, normal heart sounds and intact distal pulses.  PMI is not displaced.  Exam reveals no gallop, no friction rub and no decreased pulses.    No murmur heard.  Pulses:       Carotid pulses are 3+ on the right side, and 3+ on the left side.       Radial pulses are 3+ on the right side, and 3+ on the left side.   Pulmonary/Chest: Effort normal and breath sounds normal. No respiratory distress. He has no wheezes. He has no rales. He exhibits no tenderness.   Abdominal: Soft. Bowel sounds are normal. He exhibits no distension, no abdominal bruit and no mass. There is no splenomegaly or hepatomegaly. There is no tenderness. There is no rebound and no guarding.   Musculoskeletal: Normal range of motion. He exhibits no edema, tenderness or deformity.   Lymphadenopathy:     He has no cervical adenopathy.   Neurological: He is alert. He has normal reflexes. No cranial nerve deficit. He exhibits normal muscle tone. Coordination normal.   Skin: Skin is warm and dry. No rash noted. He is not diaphoretic. No erythema.   Psychiatric: He has a normal mood and affect. His behavior is normal. Judgment and thought content normal.         Lab Review  Lab Results   Component Value Date     10/24/2017    K 4.7 10/24/2017     10/24/2017    BUN 22 (H) 10/24/2017    CREATININE 1.61 (H) 10/24/2017    GLUCOSE 79 10/24/2017    CALCIUM 10.0 10/24/2017    ALT 33 10/22/2017    ALKPHOS 84 10/22/2017    LABIL2 1.6 10/22/2017     Lab Results   Component Value Date    CKTOTAL 151 10/22/2017     Lab Results   Component Value Date    WBC 5.09 10/22/2017    HGB 14.3 10/22/2017    HCT 42.8 10/22/2017     10/22/2017     Lab Results   Component Value Date    INR 0.92 10/22/2017    INR <0.90 08/30/2016     Lab Results   Component Value Date    MG 2.0 10/23/2017      Lab Results   Component Value Date    PSA 1.450 07/11/2017    TSH 2.184 10/22/2017     No results found for: BNP  Lab Results   Component Value Date    CHLPL 144 03/25/2016    CHOL 154 10/23/2017    TRIG 262 (H) 10/23/2017    HDL 33 (L) 10/23/2017    LDLCALC 95 07/11/2017    VLDL 53 07/11/2017    LDLHDL 2.38 07/11/2017           ECG 12 Lead  Date/Time: 10/27/2017 8:37 AM  Performed by: DUSTIN ANN  Authorized by: DUSTIN ANN   Comparison: compared with previous ECG from 10/22/2017  Comparison to previous ECG: Atrial fibrillation has converted to sinus rhythm  Rhythm: sinus rhythm  Rate: normal  ST Segments: ST segments normal  T Waves: T waves normal  QRS axis: normal  Other: no other findings  Other findings: LAE  Clinical impression: abnormal ECG and myocardial infarction  Comments: Old inferior wall myocardial infarction               I personally viewed and interpreted the patient's LAB data         Assessment:     1. Atrial fibrillation, unspecified type    2. Immunization due    3. Essential hypertension    4. Palpitations    5. Chronic obstructive pulmonary disease, unspecified COPD type    6. Type 2 diabetes mellitus without complication, without long-term current use of insulin    7. Chronic renal failure, stage 2 (mild)    8. Chronic anticoagulation, eliquis          Plan:      Patient recently was found to have new onset atrial fibrillation with rapid ventricular rate.  Currently is in sinus rhythm on Tambocor therapy that'll be continued.  Heart rate is a little bit fast.  He was advised to increase Lopressor 50 twice a day.  Anticoagulation with eliquis would be continued there are no side effects.  Lipid panel shows normal cholesterol but elevated triglyceride.  Weight loss exercise program and healthy lifestyle emphasized.  An  Renal functions are abnormal but stable.  Patient will check his blood pressure closely and will adjust medications further.        Return in about 3  months (around 1/25/2018).

## 2017-10-27 ENCOUNTER — TELEPHONE (OUTPATIENT)
Dept: CARDIOLOGY | Facility: CLINIC | Age: 67
End: 2017-10-27

## 2017-10-27 PROBLEM — Z79.01 CHRONIC ANTICOAGULATION: Status: ACTIVE | Noted: 2017-10-27

## 2017-10-27 NOTE — TELEPHONE ENCOUNTER
----- Message from Adiel Denney MD sent at 10/26/2017  3:23 PM EDT -----  It is your choice to have colonoscopy or not her.  Stopping eliquis is required but you can have a stroke but nothing might happen.  You have to decide because there is no easy answer  ----- Message -----     From: Toña Gilbert MA     Sent: 10/26/2017   1:21 PM       To: MD Dr. Georges Quintana requesting that Todd be off eliquis 72 hours before colonoscopy

## 2017-11-06 ENCOUNTER — LAB (OUTPATIENT)
Dept: LAB | Facility: HOSPITAL | Age: 67
End: 2017-11-06
Attending: INTERNAL MEDICINE

## 2017-11-06 ENCOUNTER — TELEPHONE (OUTPATIENT)
Dept: CARDIOLOGY | Facility: CLINIC | Age: 67
End: 2017-11-06

## 2017-11-06 ENCOUNTER — TRANSCRIBE ORDERS (OUTPATIENT)
Dept: ADMINISTRATIVE | Facility: HOSPITAL | Age: 67
End: 2017-11-06

## 2017-11-06 DIAGNOSIS — N18.30 CHRONIC KIDNEY DISEASE, STAGE III (MODERATE) (HCC): ICD-10-CM

## 2017-11-06 DIAGNOSIS — N18.30 CHRONIC KIDNEY DISEASE, STAGE III (MODERATE) (HCC): Primary | ICD-10-CM

## 2017-11-06 LAB
ALBUMIN SERPL-MCNC: 4.6 G/DL (ref 3.4–4.8)
ALBUMIN UR-MCNC: 0.7 MG/L
ALBUMIN/GLOB SERPL: 1.4 G/DL (ref 1.5–2.5)
ALP SERPL-CCNC: 73 U/L (ref 40–129)
ALT SERPL W P-5'-P-CCNC: 39 U/L (ref 10–44)
ANION GAP SERPL CALCULATED.3IONS-SCNC: 6.2 MMOL/L (ref 3.6–11.2)
AST SERPL-CCNC: 37 U/L (ref 10–34)
BILIRUB SERPL-MCNC: 1 MG/DL (ref 0.2–1.8)
BILIRUB UR QL STRIP: NEGATIVE
BUN BLD-MCNC: 20 MG/DL (ref 7–21)
BUN/CREAT SERPL: 12.3 (ref 7–25)
CALCIUM SPEC-SCNC: 10.1 MG/DL (ref 7.7–10)
CHLORIDE SERPL-SCNC: 106 MMOL/L (ref 99–112)
CLARITY UR: CLEAR
CO2 SERPL-SCNC: 27.8 MMOL/L (ref 24.3–31.9)
COLOR UR: YELLOW
CREAT BLD-MCNC: 1.62 MG/DL (ref 0.43–1.29)
CREAT UR-MCNC: 109.3 MG/DL
GFR SERPL CREATININE-BSD FRML MDRD: 42 ML/MIN/1.73
GLOBULIN UR ELPH-MCNC: 3.2 GM/DL
GLUCOSE BLD-MCNC: 111 MG/DL (ref 70–110)
GLUCOSE UR STRIP-MCNC: NEGATIVE MG/DL
HGB UR QL STRIP.AUTO: NEGATIVE
KETONES UR QL STRIP: NEGATIVE
LEUKOCYTE ESTERASE UR QL STRIP.AUTO: NEGATIVE
MICROALBUMIN/CREAT UR: 0.6 MG/G
NITRITE UR QL STRIP: NEGATIVE
OSMOLALITY SERPL CALC.SUM OF ELEC: 282.7 MOSM/KG (ref 273–305)
PH UR STRIP.AUTO: 7 [PH] (ref 5–8)
POTASSIUM BLD-SCNC: 4.7 MMOL/L (ref 3.5–5.3)
PROT SERPL-MCNC: 7.8 G/DL (ref 6–8)
PROT UR QL STRIP: NEGATIVE
SODIUM BLD-SCNC: 140 MMOL/L (ref 135–153)
SP GR UR STRIP: 1.01 (ref 1–1.03)
UROBILINOGEN UR QL STRIP: NORMAL

## 2017-11-06 PROCEDURE — 82570 ASSAY OF URINE CREATININE: CPT | Performed by: INTERNAL MEDICINE

## 2017-11-06 PROCEDURE — 80053 COMPREHEN METABOLIC PANEL: CPT | Performed by: INTERNAL MEDICINE

## 2017-11-06 PROCEDURE — 81003 URINALYSIS AUTO W/O SCOPE: CPT | Performed by: INTERNAL MEDICINE

## 2017-11-06 PROCEDURE — 36415 COLL VENOUS BLD VENIPUNCTURE: CPT

## 2017-11-06 PROCEDURE — 82043 UR ALBUMIN QUANTITATIVE: CPT | Performed by: INTERNAL MEDICINE

## 2017-11-06 NOTE — TELEPHONE ENCOUNTER
----- Message from Adiel Denney MD sent at 10/26/2017  3:22 PM EDT -----  There is no good answer for that it is your choice to have colonoscopy or not.  Obviously he cannot do colonoscopy with eliquis.  It has to be stopped.  You're to make a decision  ----- Message -----     From: Toña Gilbert MA     Sent: 10/26/2017   1:21 PM       To: MD Dr. Georges Quintana requesting that Todd be off eliquis 72 hours before colonoscopy

## 2017-11-13 ENCOUNTER — HOSPITAL ENCOUNTER (EMERGENCY)
Facility: HOSPITAL | Age: 67
End: 2017-11-13

## 2017-11-13 ENCOUNTER — LAB (OUTPATIENT)
Dept: LAB | Facility: HOSPITAL | Age: 67
End: 2017-11-13
Attending: INTERNAL MEDICINE

## 2017-11-13 ENCOUNTER — TRANSCRIBE ORDERS (OUTPATIENT)
Dept: GENERAL RADIOLOGY | Facility: HOSPITAL | Age: 67
End: 2017-11-13

## 2017-11-13 DIAGNOSIS — N18.30 CHRONIC KIDNEY DISEASE, STAGE III (MODERATE) (HCC): ICD-10-CM

## 2017-11-13 DIAGNOSIS — N18.30 CHRONIC KIDNEY DISEASE, STAGE III (MODERATE) (HCC): Primary | ICD-10-CM

## 2017-11-13 LAB
ANION GAP SERPL CALCULATED.3IONS-SCNC: 6.9 MMOL/L (ref 3.6–11.2)
BUN BLD-MCNC: 20 MG/DL (ref 7–21)
BUN/CREAT SERPL: 11.8 (ref 7–25)
CALCIUM SPEC-SCNC: 9.6 MG/DL (ref 7.7–10)
CHLORIDE SERPL-SCNC: 107 MMOL/L (ref 99–112)
CO2 SERPL-SCNC: 28.1 MMOL/L (ref 24.3–31.9)
CREAT BLD-MCNC: 1.69 MG/DL (ref 0.43–1.29)
GFR SERPL CREATININE-BSD FRML MDRD: 40 ML/MIN/1.73
GLUCOSE BLD-MCNC: 96 MG/DL (ref 70–110)
OSMOLALITY SERPL CALC.SUM OF ELEC: 285.6 MOSM/KG (ref 273–305)
POTASSIUM BLD-SCNC: 5.1 MMOL/L (ref 3.5–5.3)
SODIUM BLD-SCNC: 142 MMOL/L (ref 135–153)

## 2017-11-13 PROCEDURE — 80048 BASIC METABOLIC PNL TOTAL CA: CPT | Performed by: INTERNAL MEDICINE

## 2017-11-13 PROCEDURE — 36415 COLL VENOUS BLD VENIPUNCTURE: CPT

## 2017-11-15 ENCOUNTER — CONSULT (OUTPATIENT)
Dept: CARDIOLOGY | Facility: CLINIC | Age: 67
End: 2017-11-15

## 2017-11-15 VITALS
HEART RATE: 64 BPM | HEIGHT: 71 IN | SYSTOLIC BLOOD PRESSURE: 122 MMHG | OXYGEN SATURATION: 95 % | BODY MASS INDEX: 32.23 KG/M2 | WEIGHT: 230.2 LBS | DIASTOLIC BLOOD PRESSURE: 70 MMHG

## 2017-11-15 DIAGNOSIS — Z79.01 CHRONIC ANTICOAGULATION: ICD-10-CM

## 2017-11-15 DIAGNOSIS — J44.9 CHRONIC OBSTRUCTIVE PULMONARY DISEASE, UNSPECIFIED COPD TYPE (HCC): ICD-10-CM

## 2017-11-15 DIAGNOSIS — Z51.81 ENCOUNTER FOR MONITORING FLECAINIDE THERAPY: ICD-10-CM

## 2017-11-15 DIAGNOSIS — E78.2 MIXED HYPERLIPIDEMIA: ICD-10-CM

## 2017-11-15 DIAGNOSIS — K21.9 GASTROESOPHAGEAL REFLUX DISEASE WITHOUT ESOPHAGITIS: ICD-10-CM

## 2017-11-15 DIAGNOSIS — F41.9 ANXIETY AND DEPRESSION: ICD-10-CM

## 2017-11-15 DIAGNOSIS — Z01.818 PREOPERATIVE CLEARANCE: Primary | ICD-10-CM

## 2017-11-15 DIAGNOSIS — N18.30 CKD STAGE 3 SECONDARY TO DIABETES (HCC): ICD-10-CM

## 2017-11-15 DIAGNOSIS — E11.9 TYPE 2 DIABETES MELLITUS WITHOUT COMPLICATION, WITHOUT LONG-TERM CURRENT USE OF INSULIN (HCC): ICD-10-CM

## 2017-11-15 DIAGNOSIS — F32.A ANXIETY AND DEPRESSION: ICD-10-CM

## 2017-11-15 DIAGNOSIS — E11.22 CKD STAGE 3 SECONDARY TO DIABETES (HCC): ICD-10-CM

## 2017-11-15 DIAGNOSIS — Z79.899 ENCOUNTER FOR MONITORING FLECAINIDE THERAPY: ICD-10-CM

## 2017-11-15 DIAGNOSIS — M10.9 GOUT WITHOUT TOPHUS: ICD-10-CM

## 2017-11-15 DIAGNOSIS — I48.0 PAROXYSMAL ATRIAL FIBRILLATION (HCC): ICD-10-CM

## 2017-11-15 PROBLEM — I48.91 NEW ONSET A-FIB (HCC): Status: RESOLVED | Noted: 2017-10-22 | Resolved: 2017-11-15

## 2017-11-15 PROBLEM — I20.9 ANGINA PECTORIS (HCC): Status: RESOLVED | Noted: 2017-10-22 | Resolved: 2017-11-15

## 2017-11-15 PROBLEM — R59.0 LYMPHADENOPATHY, SUBMANDIBULAR: Status: RESOLVED | Noted: 2017-04-29 | Resolved: 2017-11-15

## 2017-11-15 PROBLEM — I48.91 ATRIAL FIBRILLATION WITH RVR (HCC): Status: RESOLVED | Noted: 2017-10-22 | Resolved: 2017-11-15

## 2017-11-15 PROCEDURE — 99214 OFFICE O/P EST MOD 30 MIN: CPT | Performed by: INTERNAL MEDICINE

## 2017-11-15 NOTE — PROGRESS NOTES
subjective     Chief Complaint   Patient presents with   • Medical Clearance     History of Present Illness  Patient is 67 years old white male who is planning to have colonoscopy.  Patient is here for preop clearance.  Patient has paroxysmal atrial fibrillation he required cardioversion he is not taking eliquis and Tambocor with that he is maintaining sinus rhythm.  Heart rate is controlled with Lopressor.  Currently patient is asymptomatic no chest pain palpitations or shortness of breath.    Recently patient had cardiac workup done.  Exercise PET scan was negative for significant exercise-induced myocardial ischemia.    He presented very well controlled with Lipitor.  Sugar is controlled with Glucotrol  Currently patient is asymptomatic.    Past Surgical History:   Procedure Laterality Date   • CARDIAC CATHETERIZATION  11/01/1999   • CARDIOVASCULAR STRESS TEST  09/2012   • ECHO - CONVERTED  09/2012   • KIDNEY STONE SURGERY     • UPPER GASTROINTESTINAL ENDOSCOPY  08/30/2012     Family History   Problem Relation Age of Onset   • Heart attack Mother    • Heart disease Mother    • Stroke Mother    • Kidney disease Mother    • Heart failure Mother    • Lung disease Father    • Tuberculosis Father    • Diabetes Sister    • Heart attack Brother    • Heart failure Brother    • Heart disease Brother    • Diabetes Sister    • No Known Problems Brother    • No Known Problems Brother      Past Medical History:   Diagnosis Date   • Abnormal ECG    • Antral gastritis    • Atrial fibrillation    • Borderline diabetes mellitus    • Chest pain    • COPD (chronic obstructive pulmonary disease)    • Coronary artery disease    • Diabetes mellitus    • Duodenitis    • Emphysema of lung    • History of EKG 03/28/2016    NORMAL   • Hyperlipidemia    • Hypertension    • Kidney stone    • Obesity    • Palpitations    • Reflux esophagitis    • Renal failure     MILD     Patient Active Problem List   Diagnosis   • Hyperlipidemia   • COPD  (chronic obstructive pulmonary disease)   • Gout without tophus   • Anxiety and depression   • Primary insomnia   • Gastroesophageal reflux disease without esophagitis   • Nonobstructive atherosclerosis of coronary artery   • CKD stage 3 secondary to diabetes   • DM2 (diabetes mellitus, type 2)   • Paroxysmal atrial fibrillation   • Chronic anticoagulation, eliquis   • Encounter for monitoring flecainide therapy   • Preoperative clearance       Social History   Substance Use Topics   • Smoking status: Never Smoker   • Smokeless tobacco: Never Used   • Alcohol use No      Comment: s/p 1975       Allergies   Allergen Reactions   • Morphine And Related      Morphine caused pt to isaac       Current Outpatient Prescriptions on File Prior to Visit   Medication Sig   • albuterol (PROVENTIL HFA;VENTOLIN HFA) 108 (90 BASE) MCG/ACT inhaler Inhale 2 puffs. EVERY 4-6 HOURS   • apixaban (ELIQUIS) 5 MG tablet tablet Take 1 tablet by mouth Every 12 (Twelve) Hours.   • aspirin 81 MG tablet Take 81 mg by mouth daily.   • atorvastatin (LIPITOR) 10 MG tablet Take 5 mg by mouth daily.   • colchicine 0.6 MG tablet Take 1 tablet by mouth Daily.   • flecainide (TAMBOCOR) 50 MG tablet Take 1 tablet by mouth Every 12 (Twelve) Hours.   • FLUoxetine (PROzac) 20 MG capsule Take 20 mg by mouth 3 (three) times a day.   • glipiZIDE (GLUCOTROL) 5 MG tablet Take 5 mg by mouth Daily.   • metoprolol tartrate (LOPRESSOR) 50 MG tablet Take 1 tablet by mouth 2 (Two) Times a Day.   • mometasone (ASMANEX) 220 MCG/INH inhaler Inhale 2 puffs 1 (one) time daily.   • montelukast (SINGULAIR) 10 MG tablet Take 10 mg by mouth daily.   • nitroglycerin (NITROSTAT) 0.4 MG SL tablet Place 0.4 mg under the tongue as needed for chest pain.   • pantoprazole (PROTONIX) 40 MG EC tablet Take 40 mg by mouth 2 (two) times a day.   • polyethylene glycol (MIRALAX) packet Take 17 g by mouth daily.   • Tiotropium Bromide-Olodaterol 2.5-2.5 MCG/ACT aerosol solution Inhale 2  "puffs Daily.   • allopurinol (ZYLOPRIM) 100 MG tablet take 1 tablet by mouth once daily     No current facility-administered medications on file prior to visit.          The following portions of the patient's history were reviewed and updated as appropriate: allergies, current medications, past family history, past medical history, past social history, past surgical history and problem list.    Review of Systems   Constitution: Negative.   HENT: Negative.  Negative for congestion.    Eyes: Negative.    Cardiovascular: Negative.  Negative for chest pain, cyanosis, dyspnea on exertion, irregular heartbeat, leg swelling, near-syncope, orthopnea, palpitations, paroxysmal nocturnal dyspnea and syncope.   Respiratory: Negative.  Negative for shortness of breath.    Hematologic/Lymphatic: Negative.    Musculoskeletal: Negative.    Gastrointestinal: Negative.    Neurological: Negative.  Negative for headaches.          Objective:     /70 (BP Location: Left arm, Patient Position: Sitting)  Pulse 64  Ht 71\" (180.3 cm)  Wt 230 lb 3.2 oz (104 kg)  SpO2 95%  BMI 32.11 kg/m2  Physical Exam   Constitutional: He appears well-developed and well-nourished. No distress.   HENT:   Head: Normocephalic and atraumatic.   Mouth/Throat: Oropharynx is clear and moist. No oropharyngeal exudate.   Eyes: Conjunctivae and EOM are normal. Pupils are equal, round, and reactive to light. No scleral icterus.   Neck: Normal range of motion. Neck supple. No JVD present. No tracheal deviation present. No thyromegaly present.   Cardiovascular: Normal rate, regular rhythm, normal heart sounds and intact distal pulses.  PMI is not displaced.  Exam reveals no gallop, no friction rub and no decreased pulses.    No murmur heard.  Pulses:       Carotid pulses are 3+ on the right side, and 3+ on the left side.       Radial pulses are 3+ on the right side, and 3+ on the left side.   Pulmonary/Chest: Effort normal and breath sounds normal. No " respiratory distress. He has no wheezes. He has no rales. He exhibits no tenderness.   Abdominal: Soft. Bowel sounds are normal. He exhibits no distension, no abdominal bruit and no mass. There is no splenomegaly or hepatomegaly. There is no tenderness. There is no rebound and no guarding.   Musculoskeletal: Normal range of motion. He exhibits no edema, tenderness or deformity.   Lymphadenopathy:     He has no cervical adenopathy.   Neurological: He is alert. He has normal reflexes. No cranial nerve deficit. He exhibits normal muscle tone. Coordination normal.   Skin: Skin is warm and dry. No rash noted. He is not diaphoretic. No erythema.   Psychiatric: He has a normal mood and affect. His behavior is normal. Judgment and thought content normal.         Lab Review  Lab Results   Component Value Date     11/13/2017    K 5.1 11/13/2017     11/13/2017    BUN 20 11/13/2017    CREATININE 1.69 (H) 11/13/2017    GLUCOSE 96 11/13/2017    CALCIUM 9.6 11/13/2017    ALT 39 11/06/2017    ALKPHOS 73 11/06/2017    LABIL2 1.4 (L) 11/06/2017     Lab Results   Component Value Date    CKTOTAL 151 10/22/2017     Lab Results   Component Value Date    WBC 5.09 10/22/2017    HGB 14.3 10/22/2017    HCT 42.8 10/22/2017     10/22/2017     Lab Results   Component Value Date    INR 0.92 10/22/2017    INR <0.90 08/30/2016     Lab Results   Component Value Date    MG 2.0 10/23/2017     Lab Results   Component Value Date    PSA 1.450 07/11/2017    TSH 2.184 10/22/2017     No results found for: BNP  Lab Results   Component Value Date    CHLPL 144 03/25/2016    CHOL 154 10/23/2017    TRIG 262 (H) 10/23/2017    HDL 33 (L) 10/23/2017    LDLCALC 95 07/11/2017    VLDL 53 07/11/2017    LDLHDL 2.38 07/11/2017         Procedures       I personally viewed and interpreted the patient's LAB data         Assessment:     1. Preoperative clearance    2. Paroxysmal atrial fibrillation    3. Encounter for monitoring flecainide therapy    4.  Chronic anticoagulation, eliquis    5. Chronic obstructive pulmonary disease, unspecified COPD type    6. Anxiety and depression    7. Mixed hyperlipidemia    8. CKD stage 3 secondary to diabetes    9. Type 2 diabetes mellitus without complication, without long-term current use of insulin    10. Gastroesophageal reflux disease without esophagitis    11. Gout without tophus          Plan:      Patient is doing very well.  He is stable from cardiac standpoint.  He has paroxysmal atrial fibrillation status post cardioversion currently is in sinus rhythm with flecainide and metoprolol therapy.  Patient is anticoagulated with eliquis.  His cardiac workup has been negative.  Patient is cleared for colonoscopy.    He was advised to stop eliquis for 2 days and he will have colonoscopy on the third day.  That will make him without eliquis for almost 72 hours.    Patient will resume eliquis whenever it is okay with Dr. Su.    Follow-up scheduled        Return for Next scheduled follow up.

## 2017-11-21 ENCOUNTER — TRANSCRIBE ORDERS (OUTPATIENT)
Dept: GENERAL RADIOLOGY | Facility: HOSPITAL | Age: 67
End: 2017-11-21

## 2017-11-21 ENCOUNTER — LAB (OUTPATIENT)
Dept: LAB | Facility: HOSPITAL | Age: 67
End: 2017-11-21
Attending: INTERNAL MEDICINE

## 2017-11-21 DIAGNOSIS — N18.30 CHRONIC KIDNEY DISEASE, STAGE III (MODERATE) (HCC): Primary | ICD-10-CM

## 2017-11-21 DIAGNOSIS — N18.30 CHRONIC KIDNEY DISEASE, STAGE III (MODERATE) (HCC): ICD-10-CM

## 2017-11-21 LAB
ANION GAP SERPL CALCULATED.3IONS-SCNC: 5.5 MMOL/L (ref 3.6–11.2)
BUN BLD-MCNC: 23 MG/DL (ref 7–21)
BUN/CREAT SERPL: 14.6 (ref 7–25)
CALCIUM SPEC-SCNC: 9.8 MG/DL (ref 7.7–10)
CHLORIDE SERPL-SCNC: 106 MMOL/L (ref 99–112)
CO2 SERPL-SCNC: 27.5 MMOL/L (ref 24.3–31.9)
CREAT BLD-MCNC: 1.57 MG/DL (ref 0.43–1.29)
GFR SERPL CREATININE-BSD FRML MDRD: 44 ML/MIN/1.73
GLUCOSE BLD-MCNC: 106 MG/DL (ref 70–110)
OSMOLALITY SERPL CALC.SUM OF ELEC: 281.6 MOSM/KG (ref 273–305)
POTASSIUM BLD-SCNC: 5.3 MMOL/L (ref 3.5–5.3)
SODIUM BLD-SCNC: 139 MMOL/L (ref 135–153)

## 2017-11-21 PROCEDURE — 80048 BASIC METABOLIC PNL TOTAL CA: CPT

## 2017-11-21 PROCEDURE — 36415 COLL VENOUS BLD VENIPUNCTURE: CPT

## 2017-11-30 ENCOUNTER — OFFICE VISIT (OUTPATIENT)
Dept: CARDIOLOGY | Facility: CLINIC | Age: 67
End: 2017-11-30

## 2017-11-30 VITALS
HEART RATE: 64 BPM | WEIGHT: 231.6 LBS | DIASTOLIC BLOOD PRESSURE: 70 MMHG | HEIGHT: 71 IN | BODY MASS INDEX: 32.42 KG/M2 | SYSTOLIC BLOOD PRESSURE: 112 MMHG

## 2017-11-30 DIAGNOSIS — I10 ESSENTIAL HYPERTENSION: ICD-10-CM

## 2017-11-30 DIAGNOSIS — I48.0 PAROXYSMAL ATRIAL FIBRILLATION (HCC): Primary | ICD-10-CM

## 2017-11-30 DIAGNOSIS — E78.2 MIXED HYPERLIPIDEMIA: ICD-10-CM

## 2017-11-30 PROCEDURE — 93000 ELECTROCARDIOGRAM COMPLETE: CPT | Performed by: PHYSICIAN ASSISTANT

## 2017-11-30 PROCEDURE — 99214 OFFICE O/P EST MOD 30 MIN: CPT | Performed by: PHYSICIAN ASSISTANT

## 2017-11-30 RX ORDER — PIOGLITAZONEHYDROCHLORIDE 15 MG/1
30 TABLET ORAL DAILY
Status: ON HOLD | COMMUNITY
End: 2018-08-13

## 2017-11-30 NOTE — PROGRESS NOTES
[facility] at [facility] - Office Note  Todd Phillips         [address] [place] [address]  1950   [phone] (home)      LOCATION:  [facility].  Visit Type: Follow Up.    PCP:  Adiel Denney MD    11/30/17   Todd Phillips is a 67 y.o.  male  retired.      Chief Complaint: FU Afib s/p ECV  PROBLEM LIST:  1. Paroxysmal Atrial Fibrillation   A.  New onset w/ RVR.  Converted to NSR on IV Cardizem   B.  Echo:  Left ventricular systolic function is normal. Estimated EF = 60%.  The cardiac valves are anatomically and functionally normal.   C.  MPS:  When the stress and rest PET images were compared no areas of fixed hypoperfusion or stress-induced hypoperfusion were seen. The 3-D reconstruction showed normal contractility in all segments with a calculated ejection fraction of 65% at rest and 68% with stress. No LV dilation was seen with stress.  No evidence of a deep inducible ischemia by scintigraphic criteria.   D.  Initiated Flecainide 50mg BID. Limited with AAD choices due to CKD of 1.4 recently and in the past 1.6. Atenolol switched to Lopressor 25mg BID.    E.   Eliquis 5mg BID for CHADS-VASC of 3      2. Chest pain   - LHC in 2014 with mild nonobstructive CAD. Will get Lexiscan tomorrow to rule out ischemia. NPO after midnight      3. CKD, stage 3. Cr 1.4 recent admission     4. COPD      5.  Essential HTN      6. Hyperlipidemia       Allergies   Allergen Reactions   • Morphine And Related      Morphine caused pt to isaac         Current Outpatient Prescriptions:   •  albuterol (PROVENTIL HFA;VENTOLIN HFA) 108 (90 BASE) MCG/ACT inhaler, Inhale 2 puffs. EVERY 4-6 HOURS, Disp: , Rfl:   •  apixaban (ELIQUIS) 5 MG tablet tablet, Take 1 tablet by mouth Every 12 (Twelve) Hours., Disp: 60 tablet, Rfl: 0  •  aspirin 81 MG tablet, Take 81 mg by mouth daily., Disp: , Rfl:   •  atorvastatin (LIPITOR) 10 MG tablet, Take 5 mg by mouth daily., Disp: , Rfl:   •  colchicine  0.6 MG tablet, Take 1 tablet by mouth Daily., Disp: 90 tablet, Rfl: 2  •  flecainide (TAMBOCOR) 50 MG tablet, Take 1 tablet by mouth Every 12 (Twelve) Hours., Disp: 60 tablet, Rfl: 0  •  FLUoxetine (PROzac) 20 MG capsule, Take 20 mg by mouth 3 (three) times a day., Disp: , Rfl:   •  metoprolol tartrate (LOPRESSOR) 50 MG tablet, Take 1 tablet by mouth 2 (Two) Times a Day., Disp: 180 tablet, Rfl: 3  •  mometasone (ASMANEX) 220 MCG/INH inhaler, Inhale 2 puffs 1 (one) time daily., Disp: , Rfl:   •  montelukast (SINGULAIR) 10 MG tablet, Take 10 mg by mouth daily., Disp: , Rfl:   •  nitroglycerin (NITROSTAT) 0.4 MG SL tablet, Place 0.4 mg under the tongue as needed for chest pain., Disp: , Rfl:   •  pantoprazole (PROTONIX) 40 MG EC tablet, Take 40 mg by mouth 2 (two) times a day., Disp: , Rfl:   •  pioglitazone (ACTOS) 15 MG tablet, Take 15 mg by mouth Daily., Disp: , Rfl:   •  polyethylene glycol (MIRALAX) packet, Take 17 g by mouth daily., Disp: , Rfl:   •  Tiotropium Bromide-Olodaterol 2.5-2.5 MCG/ACT aerosol solution, Inhale 2 puffs Daily., Disp: , Rfl:     HPI  Mr. Phillips is here today for his first hospital follow-up after undergoing evaluation for A. fib with RVR, new onset.  He converted with IV Cardizem.  He underwent a cardiac workup involving stress test and echocardiogram.  No evidence of ischemia, normal EF.  He was placed on flecainide.  His antiarrhythmic options are limited secondary to renal disease.  Since discharge, he's done quite well.  On occasion, he will have fluttering in his mid sternal chest area mostly at night.  He's had no documented A. fib since discharge.  He is tolerating the medications particularly the blood thinner without any bleeding issues.      The following portions of the patient's history were reviewed in the chart and updated as appropriate: allergies, current medications, past family history, past medical history, past social history, past surgical history and problem  "list.    Review of Systems   Constitution: Negative for weakness and malaise/fatigue.   Cardiovascular: Positive for palpitations. Negative for chest pain, dyspnea on exertion and leg swelling.   Hematologic/Lymphatic: Negative for bleeding problem. Does not bruise/bleed easily.   All other systems reviewed and are negative.        height is 71\" (180.3 cm) and weight is 231 lb 9.6 oz (105 kg). His blood pressure is 112/70 and his pulse is 64.   Physical Exam   Constitutional: Vital signs are normal. He appears well-developed and well-nourished.   Cardiovascular: Normal rate, regular rhythm, S1 normal, S2 normal, normal heart sounds, intact distal pulses and normal pulses.    Pulmonary/Chest: Effort normal and breath sounds normal. He has no wheezes. He has no rhonchi. He has no rales.   Abdominal: Soft. Normal appearance and bowel sounds are normal. There is no hepatosplenomegaly.   Neurological: He is alert.           ECG 12 Lead  Date/Time: 11/30/2017 3:23 PM  Performed by: ALISON KRAMER  Authorized by: ALISON KRAMER   Comparison: compared with previous ECG from 10/25/2017  Similar to previous ECG  Rhythm: sinus rhythm  Rate: normal  QRS axis: normal  Clinical impression: normal ECG             Assessment/ Plan     Paroxysmal atrial fibrillation:  EKG performed and reviewed.  Maintaining NSR.  Continue  Medications.  RTC 5 months with EKG or sooner PRN with Dr. Fidel Lozano.    Mixed hyperlipidemia:  Continue Lipitor and appropriate diet.  Get annual lipid panel and CMP with PCP.    Essential hypertension:  Well controlled.  Continue medications.      Alison Kramer PA-C  11/30/2017 3:22 PM      EMR Dragon/Transcription disclaimer:   Much of this encounter note is an electronic transcription/translation of spoken language to printed text. The electronic translation of spoken language may permit erroneous, or at times, nonsensical words or phrases to be inadvertently transcribed; Although I have reviewed " the note for such errors, some may still exist.

## 2017-12-14 ENCOUNTER — TELEPHONE (OUTPATIENT)
Dept: CARDIOLOGY | Facility: CLINIC | Age: 67
End: 2017-12-14

## 2017-12-14 NOTE — TELEPHONE ENCOUNTER
----- Message from Adiel Denney MD sent at 12/14/2017  4:43 PM EST -----  Contact: [phone]  Patient is to be seen tomorrow and procedure postponed  ----- Message -----     From: Mony Carrillo MA     Sent: 12/14/2017   3:41 PM       To: MD Dr aster Quintana office called & Pt states that he has chest pain daily. He is having colonoscopy & EGD 12/15/17 @ 9:30am. They will NOT do without clearance from you because he said that it is daily. What to do? Is he stable?

## 2017-12-15 ENCOUNTER — CONSULT (OUTPATIENT)
Dept: CARDIOLOGY | Facility: CLINIC | Age: 67
End: 2017-12-15

## 2017-12-15 VITALS
OXYGEN SATURATION: 95 % | DIASTOLIC BLOOD PRESSURE: 84 MMHG | SYSTOLIC BLOOD PRESSURE: 124 MMHG | WEIGHT: 228 LBS | HEART RATE: 60 BPM | BODY MASS INDEX: 31.92 KG/M2 | HEIGHT: 71 IN

## 2017-12-15 DIAGNOSIS — R07.9 CHEST PAIN, UNSPECIFIED TYPE: Primary | ICD-10-CM

## 2017-12-15 DIAGNOSIS — I10 ESSENTIAL HYPERTENSION: ICD-10-CM

## 2017-12-15 DIAGNOSIS — I48.0 PAROXYSMAL ATRIAL FIBRILLATION (HCC): ICD-10-CM

## 2017-12-15 PROCEDURE — 93000 ELECTROCARDIOGRAM COMPLETE: CPT | Performed by: INTERNAL MEDICINE

## 2017-12-15 PROCEDURE — 99213 OFFICE O/P EST LOW 20 MIN: CPT | Performed by: INTERNAL MEDICINE

## 2017-12-15 NOTE — PROGRESS NOTES
subjective     Chief Complaint   Patient presents with   • Surgical Clearance   • Hypertension   • Atrial Fibrillation   • Hyperlipidemia     History of Present Illness  Patient is 67 years old white male who is planning to have colonoscopy today patient is here for preop clearance.  He already has been taking prep all night and is ready to have colonoscopy in 30 minutes.    He says that he asymptomatic.  He is not having any chest pain.  He feels some fluttering sensation in the chest.  He is taking Tambocor 50 twice a day.  He has had no sustained arrhythmia  Patient recently had a exercise PET scan which was negative for ischemia.  Currently patient is a symptomatic EKG shows normal sinus rhythm at a rate of 61 per minute      Past Surgical History:   Procedure Laterality Date   • CARDIAC CATHETERIZATION  11/01/1999   • CARDIOVASCULAR STRESS TEST  09/2012   • ECHO - CONVERTED  09/2012   • KIDNEY STONE SURGERY     • UPPER GASTROINTESTINAL ENDOSCOPY  08/30/2012     Family History   Problem Relation Age of Onset   • Heart attack Mother    • Heart disease Mother    • Stroke Mother    • Kidney disease Mother    • Heart failure Mother    • Lung disease Father    • Tuberculosis Father    • Diabetes Sister    • Heart attack Brother    • Heart failure Brother    • Heart disease Brother    • Diabetes Sister    • No Known Problems Brother    • No Known Problems Brother      Past Medical History:   Diagnosis Date   • Abnormal ECG    • Antral gastritis    • Atrial fibrillation    • Borderline diabetes mellitus    • Chest pain    • COPD (chronic obstructive pulmonary disease)    • Coronary artery disease    • Diabetes mellitus    • Duodenitis    • Emphysema of lung    • History of EKG 03/28/2016    NORMAL   • Hyperlipidemia    • Hypertension    • Kidney stone    • Obesity    • Palpitations    • Reflux esophagitis    • Renal failure     MILD     Patient Active Problem List   Diagnosis   • Hyperlipidemia   • COPD (chronic  obstructive pulmonary disease)   • Essential hypertension   • Gout without tophus   • Anxiety and depression   • Primary insomnia   • Gastroesophageal reflux disease without esophagitis   • Nonobstructive atherosclerosis of coronary artery   • CKD stage 3 secondary to diabetes   • DM2 (diabetes mellitus, type 2)   • Paroxysmal atrial fibrillation   • Chronic anticoagulation, eliquis   • Encounter for monitoring flecainide therapy   • Preoperative clearance       Social History   Substance Use Topics   • Smoking status: Never Smoker   • Smokeless tobacco: Never Used   • Alcohol use No      Comment: s/p 1975       Allergies   Allergen Reactions   • Morphine And Related      Morphine caused pt to isaac       Current Outpatient Prescriptions on File Prior to Visit   Medication Sig   • albuterol (PROVENTIL HFA;VENTOLIN HFA) 108 (90 BASE) MCG/ACT inhaler Inhale 2 puffs. EVERY 4-6 HOURS   • apixaban (ELIQUIS) 5 MG tablet tablet Take 1 tablet by mouth Every 12 (Twelve) Hours.   • atorvastatin (LIPITOR) 10 MG tablet Take 5 mg by mouth daily.   • colchicine 0.6 MG tablet Take 1 tablet by mouth Daily.   • flecainide (TAMBOCOR) 50 MG tablet Take 1 tablet by mouth Every 12 (Twelve) Hours.   • FLUoxetine (PROzac) 20 MG capsule Take 20 mg by mouth 3 (three) times a day.   • metoprolol tartrate (LOPRESSOR) 50 MG tablet Take 1 tablet by mouth 2 (Two) Times a Day.   • mometasone (ASMANEX) 220 MCG/INH inhaler Inhale 2 puffs 1 (one) time daily.   • montelukast (SINGULAIR) 10 MG tablet Take 10 mg by mouth daily.   • nitroglycerin (NITROSTAT) 0.4 MG SL tablet Place 0.4 mg under the tongue as needed for chest pain.   • pantoprazole (PROTONIX) 40 MG EC tablet Take 40 mg by mouth 2 (two) times a day.   • pioglitazone (ACTOS) 15 MG tablet Take 15 mg by mouth Daily.   • polyethylene glycol (MIRALAX) packet Take 17 g by mouth daily.   • Tiotropium Bromide-Olodaterol 2.5-2.5 MCG/ACT aerosol solution Inhale 2 puffs Daily.   • aspirin 81 MG  "tablet Take 81 mg by mouth daily.     No current facility-administered medications on file prior to visit.          The following portions of the patient's history were reviewed and updated as appropriate: allergies, current medications, past family history, past medical history, past social history, past surgical history and problem list.    Review of Systems   Constitution: Negative.   HENT: Negative.  Negative for congestion.    Eyes: Negative.    Cardiovascular: Negative.  Negative for chest pain, cyanosis, dyspnea on exertion, irregular heartbeat, leg swelling, near-syncope, orthopnea, palpitations, paroxysmal nocturnal dyspnea and syncope.   Respiratory: Negative.  Negative for shortness of breath.    Hematologic/Lymphatic: Negative.    Skin: Negative.    Musculoskeletal: Negative.    Gastrointestinal: Negative.    Genitourinary: Negative.    Neurological: Negative.  Negative for headaches.   Psychiatric/Behavioral: Negative.           Objective:     /84 (BP Location: Left arm, Patient Position: Sitting, Cuff Size: Adult)  Pulse 60  Ht 180.3 cm (71\")  Wt 103 kg (228 lb)  SpO2 95%  BMI 31.8 kg/m2  Physical Exam   Constitutional: He appears well-developed and well-nourished. No distress.   HENT:   Head: Normocephalic and atraumatic.   Mouth/Throat: Oropharynx is clear and moist. No oropharyngeal exudate.   Eyes: Conjunctivae and EOM are normal. Pupils are equal, round, and reactive to light. No scleral icterus.   Neck: Normal range of motion. Neck supple. No JVD present. No tracheal deviation present. No thyromegaly present.   Cardiovascular: Normal rate, regular rhythm, normal heart sounds and intact distal pulses.  PMI is not displaced.  Exam reveals no gallop, no friction rub and no decreased pulses.    No murmur heard.  Pulses:       Carotid pulses are 3+ on the right side, and 3+ on the left side.       Radial pulses are 3+ on the right side, and 3+ on the left side.   Pulmonary/Chest: Effort " normal and breath sounds normal. No respiratory distress. He has no wheezes. He has no rales. He exhibits no tenderness.   Abdominal: Soft. Bowel sounds are normal. He exhibits no distension, no abdominal bruit and no mass. There is no splenomegaly or hepatomegaly. There is no tenderness. There is no rebound and no guarding.   Musculoskeletal: Normal range of motion. He exhibits no edema, tenderness or deformity.   Lymphadenopathy:     He has no cervical adenopathy.   Neurological: He is alert. He has normal reflexes. No cranial nerve deficit. He exhibits normal muscle tone. Coordination normal.   Skin: Skin is warm and dry. No rash noted. He is not diaphoretic. No erythema.   Psychiatric: He has a normal mood and affect. His behavior is normal. Judgment and thought content normal.         Lab Review  Lab Results   Component Value Date     11/21/2017    K 5.3 11/21/2017     11/21/2017    BUN 23 (H) 11/21/2017    CREATININE 1.57 (H) 11/21/2017    GLUCOSE 106 11/21/2017    CALCIUM 9.8 11/21/2017    ALT 39 11/06/2017    ALKPHOS 73 11/06/2017    LABIL2 1.4 (L) 11/06/2017     Lab Results   Component Value Date    CKTOTAL 151 10/22/2017     Lab Results   Component Value Date    WBC 5.09 10/22/2017    HGB 14.3 10/22/2017    HCT 42.8 10/22/2017     10/22/2017     Lab Results   Component Value Date    INR 0.92 10/22/2017    INR <0.90 08/30/2016     Lab Results   Component Value Date    MG 2.0 10/23/2017     Lab Results   Component Value Date    PSA 1.450 07/11/2017    TSH 2.184 10/22/2017     No results found for: BNP  Lab Results   Component Value Date    CHLPL 144 03/25/2016    CHOL 154 10/23/2017    TRIG 262 (H) 10/23/2017    HDL 33 (L) 10/23/2017    LDLCALC 95 07/11/2017    VLDL 53 07/11/2017    LDLHDL 2.38 07/11/2017           ECG 12 Lead  Date/Time: 12/15/2017 12:28 PM  Performed by: DUSTIN ANN  Authorized by: DUSTIN ANN   Comparison: compared with previous ECG from  11/30/2017  Similar to previous ECG  Rhythm: sinus rhythm  Rate: normal  Conduction: conduction normal  ST Segments: ST segments normal  T Waves: T waves normal  QRS axis: normal  Other: no other findings  Clinical impression: normal ECG               I personally viewed and interpreted the patient's LAB data         Assessment:     1. Chest pain, unspecified type    2. Paroxysmal atrial fibrillation    3. Essential hypertension          Plan:      Patient had atypical chest pain in the past.  He had a negative exercise PET scan recently.  He is currently asymptomatic.  He has some fluttery feeling in the chest.  He is known to have paroxysmal atrial fibrillation and is taking Tambocor.  Lately he has had no arrhythmia EKG today is normal.  Patient is asymptomatic blood pressure is normal patient is cleared for surgery.        No Follow-up on file.

## 2018-01-29 ENCOUNTER — OFFICE VISIT (OUTPATIENT)
Dept: CARDIOLOGY | Facility: CLINIC | Age: 68
End: 2018-01-29

## 2018-01-29 VITALS
DIASTOLIC BLOOD PRESSURE: 86 MMHG | BODY MASS INDEX: 32.48 KG/M2 | HEART RATE: 57 BPM | OXYGEN SATURATION: 96 % | WEIGHT: 232 LBS | RESPIRATION RATE: 16 BRPM | HEIGHT: 71 IN | SYSTOLIC BLOOD PRESSURE: 144 MMHG

## 2018-01-29 DIAGNOSIS — Z79.899 ENCOUNTER FOR MONITORING FLECAINIDE THERAPY: ICD-10-CM

## 2018-01-29 DIAGNOSIS — F51.01 PRIMARY INSOMNIA: ICD-10-CM

## 2018-01-29 DIAGNOSIS — I48.0 PAROXYSMAL ATRIAL FIBRILLATION (HCC): ICD-10-CM

## 2018-01-29 DIAGNOSIS — I10 ESSENTIAL HYPERTENSION: Primary | ICD-10-CM

## 2018-01-29 DIAGNOSIS — E11.21 TYPE 2 DIABETES MELLITUS WITH DIABETIC NEPHROPATHY, WITHOUT LONG-TERM CURRENT USE OF INSULIN (HCC): ICD-10-CM

## 2018-01-29 DIAGNOSIS — Z79.01 CHRONIC ANTICOAGULATION: ICD-10-CM

## 2018-01-29 DIAGNOSIS — Z51.81 ENCOUNTER FOR MONITORING FLECAINIDE THERAPY: ICD-10-CM

## 2018-01-29 DIAGNOSIS — E78.2 MIXED HYPERLIPIDEMIA: ICD-10-CM

## 2018-01-29 PROCEDURE — 99214 OFFICE O/P EST MOD 30 MIN: CPT | Performed by: INTERNAL MEDICINE

## 2018-01-29 RX ORDER — PSEUDOEPHEDRINE HCL 30 MG/1
TABLET, FILM COATED ORAL
Refills: 0 | COMMUNITY
Start: 2017-12-18 | End: 2018-04-30

## 2018-01-29 RX ORDER — ACETAMINOPHEN 325 MG/1
325 TABLET ORAL 4 TIMES DAILY PRN
Status: ON HOLD | COMMUNITY
End: 2018-08-13

## 2018-01-29 RX ORDER — MELATONIN
1000 DAILY
COMMUNITY

## 2018-01-29 RX ORDER — LOSARTAN POTASSIUM 50 MG/1
50 TABLET ORAL DAILY
Qty: 90 TABLET | Refills: 1 | Status: SHIPPED | OUTPATIENT
Start: 2018-01-29 | End: 2018-05-01

## 2018-01-29 RX ORDER — CEFDINIR 300 MG/1
CAPSULE ORAL
Refills: 0 | COMMUNITY
Start: 2017-12-18 | End: 2018-04-30

## 2018-02-19 ENCOUNTER — TRANSCRIBE ORDERS (OUTPATIENT)
Dept: GENERAL RADIOLOGY | Facility: HOSPITAL | Age: 68
End: 2018-02-19

## 2018-02-19 ENCOUNTER — LAB (OUTPATIENT)
Dept: LAB | Facility: HOSPITAL | Age: 68
End: 2018-02-19
Attending: INTERNAL MEDICINE

## 2018-02-19 DIAGNOSIS — M10.9 GOUT, ARTHRITIS: ICD-10-CM

## 2018-02-19 DIAGNOSIS — N18.30 CHRONIC KIDNEY DISEASE, STAGE III (MODERATE) (HCC): ICD-10-CM

## 2018-02-19 DIAGNOSIS — N18.30 CHRONIC KIDNEY DISEASE, STAGE III (MODERATE) (HCC): Primary | ICD-10-CM

## 2018-02-19 LAB
ALBUMIN SERPL-MCNC: 4.6 G/DL (ref 3.4–4.8)
ALBUMIN/GLOB SERPL: 1.9 G/DL (ref 1.5–2.5)
ALP SERPL-CCNC: 72 U/L (ref 40–129)
ALT SERPL W P-5'-P-CCNC: 29 U/L (ref 10–44)
ANION GAP SERPL CALCULATED.3IONS-SCNC: 5.2 MMOL/L (ref 3.6–11.2)
AST SERPL-CCNC: 29 U/L (ref 10–34)
BILIRUB SERPL-MCNC: 0.8 MG/DL (ref 0.2–1.8)
BILIRUB UR QL STRIP: NEGATIVE
BUN BLD-MCNC: 28 MG/DL (ref 7–21)
BUN/CREAT SERPL: 18.9 (ref 7–25)
CALCIUM SPEC-SCNC: 9.7 MG/DL (ref 7.7–10)
CHLORIDE SERPL-SCNC: 106 MMOL/L (ref 99–112)
CLARITY UR: CLEAR
CO2 SERPL-SCNC: 29.8 MMOL/L (ref 24.3–31.9)
COLOR UR: YELLOW
CREAT BLD-MCNC: 1.48 MG/DL (ref 0.43–1.29)
CREAT UR-MCNC: 122.5 MG/DL
GFR SERPL CREATININE-BSD FRML MDRD: 47 ML/MIN/1.73
GLOBULIN UR ELPH-MCNC: 2.4 GM/DL
GLUCOSE BLD-MCNC: 115 MG/DL (ref 70–110)
GLUCOSE UR STRIP-MCNC: NEGATIVE MG/DL
HGB UR QL STRIP.AUTO: NEGATIVE
KETONES UR QL STRIP: NEGATIVE
LEUKOCYTE ESTERASE UR QL STRIP.AUTO: NEGATIVE
NITRITE UR QL STRIP: NEGATIVE
OSMOLALITY SERPL CALC.SUM OF ELEC: 287.6 MOSM/KG (ref 273–305)
PH UR STRIP.AUTO: 6.5 [PH] (ref 5–8)
POTASSIUM BLD-SCNC: 4.8 MMOL/L (ref 3.5–5.3)
PROT SERPL-MCNC: 7 G/DL (ref 6–8)
PROT UR QL STRIP: NEGATIVE
PROT UR-MCNC: 4.5 MG/DL
PROT/CREAT UR: 36.7 MG/G CREA (ref 0–200)
SODIUM BLD-SCNC: 141 MMOL/L (ref 135–153)
SP GR UR STRIP: 1.02 (ref 1–1.03)
URATE SERPL-MCNC: 7.1 MG/DL (ref 3.7–7)
UROBILINOGEN UR QL STRIP: NORMAL

## 2018-02-19 PROCEDURE — 81003 URINALYSIS AUTO W/O SCOPE: CPT

## 2018-02-19 PROCEDURE — 84550 ASSAY OF BLOOD/URIC ACID: CPT

## 2018-02-19 PROCEDURE — 84156 ASSAY OF PROTEIN URINE: CPT

## 2018-02-19 PROCEDURE — 82570 ASSAY OF URINE CREATININE: CPT

## 2018-02-19 PROCEDURE — 80053 COMPREHEN METABOLIC PANEL: CPT

## 2018-02-19 PROCEDURE — 36415 COLL VENOUS BLD VENIPUNCTURE: CPT

## 2018-04-27 ENCOUNTER — LAB (OUTPATIENT)
Dept: LAB | Facility: HOSPITAL | Age: 68
End: 2018-04-27
Attending: INTERNAL MEDICINE

## 2018-04-27 DIAGNOSIS — E78.2 MIXED HYPERLIPIDEMIA: ICD-10-CM

## 2018-04-27 DIAGNOSIS — E11.21 TYPE 2 DIABETES MELLITUS WITH DIABETIC NEPHROPATHY, WITHOUT LONG-TERM CURRENT USE OF INSULIN (HCC): ICD-10-CM

## 2018-04-27 LAB
ALBUMIN SERPL-MCNC: 4.4 G/DL (ref 3.4–4.8)
ALBUMIN UR-MCNC: 2.4 MG/L
ALBUMIN/GLOB SERPL: 1.5 G/DL (ref 1.5–2.5)
ALP SERPL-CCNC: 69 U/L (ref 40–129)
ALT SERPL W P-5'-P-CCNC: 29 U/L (ref 10–44)
ANION GAP SERPL CALCULATED.3IONS-SCNC: 8.4 MMOL/L (ref 3.6–11.2)
AST SERPL-CCNC: 29 U/L (ref 10–34)
BASOPHILS # BLD AUTO: 0.01 10*3/MM3 (ref 0–0.3)
BASOPHILS NFR BLD AUTO: 0.2 % (ref 0–2)
BILIRUB SERPL-MCNC: 0.4 MG/DL (ref 0.2–1.8)
BUN BLD-MCNC: 23 MG/DL (ref 7–21)
BUN/CREAT SERPL: 14.8 (ref 7–25)
CALCIUM SPEC-SCNC: 9.8 MG/DL (ref 7.7–10)
CHLORIDE SERPL-SCNC: 107 MMOL/L (ref 99–112)
CHOLEST SERPL-MCNC: 128 MG/DL (ref 0–200)
CK SERPL-CCNC: 85 U/L (ref 24–204)
CO2 SERPL-SCNC: 26.6 MMOL/L (ref 24.3–31.9)
CREAT BLD-MCNC: 1.55 MG/DL (ref 0.43–1.29)
DEPRECATED RDW RBC AUTO: 44 FL (ref 37–54)
EOSINOPHIL # BLD AUTO: 0.09 10*3/MM3 (ref 0–0.7)
EOSINOPHIL NFR BLD AUTO: 1.8 % (ref 0–7)
ERYTHROCYTE [DISTWIDTH] IN BLOOD BY AUTOMATED COUNT: 13.6 % (ref 11.5–14.5)
GFR SERPL CREATININE-BSD FRML MDRD: 45 ML/MIN/1.73
GLOBULIN UR ELPH-MCNC: 3 GM/DL
GLUCOSE BLD-MCNC: 119 MG/DL (ref 70–110)
HBA1C MFR BLD: 6.2 % (ref 4.5–5.7)
HCT VFR BLD AUTO: 44.9 % (ref 42–52)
HDLC SERPL-MCNC: 41 MG/DL (ref 60–100)
HGB BLD-MCNC: 15.1 G/DL (ref 14–18)
IMM GRANULOCYTES # BLD: 0.03 10*3/MM3 (ref 0–0.03)
IMM GRANULOCYTES NFR BLD: 0.6 % (ref 0–0.5)
LDLC SERPL CALC-MCNC: 59 MG/DL (ref 0–100)
LDLC/HDLC SERPL: 1.44 {RATIO}
LYMPHOCYTES # BLD AUTO: 1.82 10*3/MM3 (ref 1–3)
LYMPHOCYTES NFR BLD AUTO: 36.5 % (ref 16–46)
MCH RBC QN AUTO: 30.3 PG (ref 27–33)
MCHC RBC AUTO-ENTMCNC: 33.6 G/DL (ref 33–37)
MCV RBC AUTO: 90 FL (ref 80–94)
MONOCYTES # BLD AUTO: 0.52 10*3/MM3 (ref 0.1–0.9)
MONOCYTES NFR BLD AUTO: 10.4 % (ref 0–12)
NEUTROPHILS # BLD AUTO: 2.51 10*3/MM3 (ref 1.4–6.5)
NEUTROPHILS NFR BLD AUTO: 50.5 % (ref 40–75)
OSMOLALITY SERPL CALC.SUM OF ELEC: 287.9 MOSM/KG (ref 273–305)
PLATELET # BLD AUTO: 161 10*3/MM3 (ref 130–400)
PMV BLD AUTO: 10.1 FL (ref 6–10)
POTASSIUM BLD-SCNC: 5.1 MMOL/L (ref 3.5–5.3)
PROT SERPL-MCNC: 7.4 G/DL (ref 6–8)
RBC # BLD AUTO: 4.99 10*6/MM3 (ref 4.7–6.1)
SODIUM BLD-SCNC: 142 MMOL/L (ref 135–153)
TRIGL SERPL-MCNC: 140 MG/DL (ref 0–150)
VLDLC SERPL-MCNC: 28 MG/DL
WBC NRBC COR # BLD: 4.98 10*3/MM3 (ref 4.5–12.5)

## 2018-04-27 PROCEDURE — 82550 ASSAY OF CK (CPK): CPT

## 2018-04-27 PROCEDURE — 80061 LIPID PANEL: CPT

## 2018-04-27 PROCEDURE — 82043 UR ALBUMIN QUANTITATIVE: CPT

## 2018-04-27 PROCEDURE — 83036 HEMOGLOBIN GLYCOSYLATED A1C: CPT

## 2018-04-27 PROCEDURE — 85025 COMPLETE CBC W/AUTO DIFF WBC: CPT

## 2018-04-27 PROCEDURE — 80053 COMPREHEN METABOLIC PANEL: CPT

## 2018-04-30 ENCOUNTER — OFFICE VISIT (OUTPATIENT)
Dept: CARDIOLOGY | Facility: CLINIC | Age: 68
End: 2018-04-30

## 2018-04-30 VITALS
HEART RATE: 61 BPM | WEIGHT: 236.2 LBS | DIASTOLIC BLOOD PRESSURE: 78 MMHG | HEIGHT: 71 IN | BODY MASS INDEX: 33.07 KG/M2 | SYSTOLIC BLOOD PRESSURE: 110 MMHG

## 2018-04-30 DIAGNOSIS — I48.0 PAROXYSMAL ATRIAL FIBRILLATION (HCC): Primary | ICD-10-CM

## 2018-04-30 DIAGNOSIS — I10 ESSENTIAL HYPERTENSION: ICD-10-CM

## 2018-04-30 PROCEDURE — 99213 OFFICE O/P EST LOW 20 MIN: CPT | Performed by: NURSE PRACTITIONER

## 2018-04-30 PROCEDURE — 93000 ELECTROCARDIOGRAM COMPLETE: CPT | Performed by: NURSE PRACTITIONER

## 2018-04-30 NOTE — PROGRESS NOTES
Subjective:   Todd Phillips  1950  [phone]      04/30/2018    [facility] CARDIOLOGY    Adiel Denney MD  [address] [place] [address] [place]  [place] [address]      Patient ID: Todd Phillips is a 67 y.o. male.    Chief Complaint:   Chief Complaint   Patient presents with   • Atrial Fibrillation   • Hypertension     Problem List:  1) Paroxsymal Atrial fibrillation, onset 10/2017   A. Started on Flecainide, continued on BBL   B. CHADSVASC = 3, on Eliquis.   C. Echocardiogram 10/2017- normal Ef   D. Stress test 10/2017- no ischemia  2) Type II DM  3) Hypertension  4) Hyperlipidemia  5) Chronic kidney disease  6) ELSY, wears CPAP    Allergies   Allergen Reactions   • Morphine And Related      Morphine caused pt to isaac       Current Outpatient Prescriptions:   •  acetaminophen (TYLENOL) 325 MG tablet, Take 325 mg by mouth 4 (Four) Times a Day As Needed for Mild Pain ., Disp: , Rfl:   •  albuterol (PROVENTIL HFA;VENTOLIN HFA) 108 (90 BASE) MCG/ACT inhaler, Inhale 2 puffs. EVERY 4-6 HOURS, Disp: , Rfl:   •  apixaban (ELIQUIS) 5 MG tablet tablet, Take 1 tablet by mouth Every 12 (Twelve) Hours., Disp: 60 tablet, Rfl: 0  •  aspirin 81 MG tablet, Take 81 mg by mouth daily., Disp: , Rfl:   •  atorvastatin (LIPITOR) 10 MG tablet, Take 5 mg by mouth daily., Disp: , Rfl:   •  cholecalciferol (VITAMIN D3) 1000 units tablet, Take 1,000 Units by mouth Daily., Disp: , Rfl:   •  colchicine 0.6 MG tablet, Take 1 tablet by mouth Daily., Disp: 90 tablet, Rfl: 2  •  flecainide (TAMBOCOR) 50 MG tablet, Take 1 tablet by mouth Every 12 (Twelve) Hours., Disp: 60 tablet, Rfl: 0  •  FLUoxetine (PROzac) 20 MG capsule, Take 20 mg by mouth 3 (three) times a day., Disp: , Rfl:   •  losartan (COZAAR) 50 MG tablet, Take 1 tablet by mouth Daily., Disp: 90 tablet, Rfl: 1  •  metoprolol tartrate (LOPRESSOR) 50 MG tablet, Take 1 tablet by mouth 2 (Two) Times a Day. (Patient taking differently: Take 25 mg by mouth 2 (Two) Times a  Day.), Disp: 180 tablet, Rfl: 3  •  mometasone (ASMANEX) 220 MCG/INH inhaler, Inhale 2 puffs 1 (one) time daily., Disp: , Rfl:   •  montelukast (SINGULAIR) 10 MG tablet, Take 10 mg by mouth daily., Disp: , Rfl:   •  OLODATEROL HCL IN, Inhale 2 puffs Daily., Disp: , Rfl:   •  pantoprazole (PROTONIX) 40 MG EC tablet, Take 40 mg by mouth 2 (two) times a day., Disp: , Rfl:   •  pioglitazone (ACTOS) 15 MG tablet, Take 30 mg by mouth Daily., Disp: , Rfl:   •  polyethylene glycol (MIRALAX) packet, Take 17 g by mouth daily., Disp: , Rfl:   •  Tiotropium Bromide-Olodaterol 2.5-2.5 MCG/ACT aerosol solution, Inhale 2 puffs Daily., Disp: , Rfl:   •  nitroglycerin (NITROSTAT) 0.4 MG SL tablet, Place 0.4 mg under the tongue as needed for chest pain., Disp: , Rfl:     History of Present Illness: Mr. Phillips is a 68 y/o male with the above noted past medical history who presentst [facility] for f/u on Afib. He had new onset Afib October 2017 but today he reports having one episode about a year ago prior to this event in October. He was not evaluated for this but reports his heart rate was very high at this time. He was started on Flecainide and lopressor. He appears to be maintaining NSR. Symptoms of afib have included feeling an irregular beat, sharp pains in chest. He reports having occasional dizziness upon standing up too quickly.  He reports another issue with getting dizzy which occurred after standing for a long period of time and upon climbing the hill. This only occurred after starting on Losartan. He reports his heart rate is usually in the 60's-70's but occasionally drops into the 40's at night. He has a CHADSVASC of 3 and is on Eliquis. No signs or symptoms of bleeding. He denies chest pain, shortness of breath, fevers, chills, night sweats, PND, orthopnea or palpitations. No recent ER visits or hospital stays. He has h/o sleep apena and is compliant with CPAP. He overall is doing well.     The following portions of the  "patient's history were reviewed and updated as appropriate: allergies, current medications, past family history, past medical history, past social history, past surgical history and problem list.    ROS   14 point ROS negative except as outlined in problem list, HPI and other parts of the note.      ECG 12 Lead  Date/Time: 4/30/2018 1:12 PM  Performed by: CHICHO MCKEON  Authorized by: CHICHO MCKEON   Comparison: compared with previous ECG from 12/15/2017  Rhythm: sinus rhythm  BPM: 61                 Objective:       Vitals:    04/30/18 1304   BP: 110/78   BP Location: Left arm   Patient Position: Sitting   Pulse: 61   Weight: 107 kg (236 lb 3.2 oz)   Height: 180.3 cm (71\")     Body mass index is 32.94 kg/m².    Physical Exam:  GENERAL: Well-developed, well-nourished patient in no acute distress.  HEENT: Normocephalic, atraumatic, PERRLA. Moist mucous membranes.  NECK: No JVD present at 30°. No carotid bruits auscultated.  LUNGS: Non labored. Clear to auscultation.  CARDIOVASCULAR: Regular rate and rhythm. No murmurs, gallops or rubs noted.   ABDOMEN: Soft, nontender. Non-distended. positive bowel sounds.  MUSCULOSKELETAL: No gross deformities. No clubbing, cyanosis, or lower extremity edema.  SKIN: Pink, warm.   Neuro: Nonfocal exam grossly intact.  Ext: No edema or bruising    The patient's old records including ambulatory rhythm recordings (ECGs, Holter/event monitor) were reviewed and discussed.      Lab Review:   Results for orders placed or performed in visit on 04/27/18   CK   Result Value Ref Range    Creatine Kinase 85 24 - 204 U/L   Comprehensive Metabolic Panel   Result Value Ref Range    Glucose 119 (H) 70 - 110 mg/dL    BUN 23 (H) 7 - 21 mg/dL    Creatinine 1.55 (H) 0.43 - 1.29 mg/dL    Sodium 142 135 - 153 mmol/L    Potassium 5.1 3.5 - 5.3 mmol/L    Chloride 107 99 - 112 mmol/L    CO2 26.6 24.3 - 31.9 mmol/L    Calcium 9.8 7.7 - 10.0 mg/dL    Total Protein 7.4 6.0 - 8.0 g/dL    " Albumin 4.40 3.40 - 4.80 g/dL    ALT (SGPT) 29 10 - 44 U/L    AST (SGOT) 29 10 - 34 U/L    Alkaline Phosphatase 69 40 - 129 U/L    Total Bilirubin 0.4 0.2 - 1.8 mg/dL    eGFR Non African Amer 45 (L) >60 mL/min/1.73    Globulin 3.0 gm/dL    A/G Ratio 1.5 1.5 - 2.5 g/dL    BUN/Creatinine Ratio 14.8 7.0 - 25.0    Anion Gap 8.4 3.6 - 11.2 mmol/L   Lipid Panel   Result Value Ref Range    Total Cholesterol 128 0 - 200 mg/dL    Triglycerides 140 0 - 150 mg/dL    HDL Cholesterol 41 (L) 60 - 100 mg/dL    LDL Cholesterol  59 0 - 100 mg/dL    VLDL Cholesterol 28 mg/dL    LDL/HDL Ratio 1.44    Hemoglobin A1c   Result Value Ref Range    Hemoglobin A1C 6.20 (H) 4.50 - 5.70 %   MicroAlbumin, Urine, Random - Urine, Clean Catch   Result Value Ref Range    Microalbumin, Urine 2.4 mg/L   CBC Auto Differential   Result Value Ref Range    WBC 4.98 4.50 - 12.50 10*3/mm3    RBC 4.99 4.70 - 6.10 10*6/mm3    Hemoglobin 15.1 14.0 - 18.0 g/dL    Hematocrit 44.9 42.0 - 52.0 %    MCV 90.0 80.0 - 94.0 fL    MCH 30.3 27.0 - 33.0 pg    MCHC 33.6 33.0 - 37.0 g/dL    RDW 13.6 11.5 - 14.5 %    RDW-SD 44.0 37.0 - 54.0 fl    MPV 10.1 (H) 6.0 - 10.0 fL    Platelets 161 130 - 400 10*3/mm3    Neutrophil % 50.5 40.0 - 75.0 %    Lymphocyte % 36.5 16.0 - 46.0 %    Monocyte % 10.4 0.0 - 12.0 %    Eosinophil % 1.8 0.0 - 7.0 %    Basophil % 0.2 0.0 - 2.0 %    Immature Grans % 0.6 (H) 0.0 - 0.5 %    Neutrophils, Absolute 2.51 1.40 - 6.50 10*3/mm3    Lymphocytes, Absolute 1.82 1.00 - 3.00 10*3/mm3    Monocytes, Absolute 0.52 0.10 - 0.90 10*3/mm3    Eosinophils, Absolute 0.09 0.00 - 0.70 10*3/mm3    Basophils, Absolute 0.01 0.00 - 0.30 10*3/mm3    Immature Grans, Absolute 0.03 0.00 - 0.03 10*3/mm3   Osmolality, Calculated   Result Value Ref Range    Osmolality Calc 287.9 273.0 - 305.0 mOsm/kg           Diagnosis:   1. Paroxysmal atrial fibrillation  2. Essential hypertension  Assessment & Plan:   1) Paroxsymal atrial fibrillation, new onset October 2017, started  on Flecainide. QRS acceptable. Appears to be maintaining NSR now. On Eliquis and lopressor was well.   Continue current medications.    2) Hypertension  Two episodes of dizziness after starting losartan. Will discuss with Dr. Denney about decreasing or discontinuing. Push fluids. If symptoms persist after these changes, he will notify our office.     F/U 6 months    VIMAL Javier. 4/30/2018  1:12 PM

## 2018-05-01 ENCOUNTER — OFFICE VISIT (OUTPATIENT)
Dept: CARDIOLOGY | Facility: CLINIC | Age: 68
End: 2018-05-01

## 2018-05-01 VITALS
OXYGEN SATURATION: 96 % | SYSTOLIC BLOOD PRESSURE: 128 MMHG | WEIGHT: 232.2 LBS | BODY MASS INDEX: 32.51 KG/M2 | HEART RATE: 61 BPM | HEIGHT: 71 IN | DIASTOLIC BLOOD PRESSURE: 90 MMHG

## 2018-05-01 DIAGNOSIS — J44.9 CHRONIC OBSTRUCTIVE PULMONARY DISEASE, UNSPECIFIED COPD TYPE (HCC): ICD-10-CM

## 2018-05-01 DIAGNOSIS — I48.0 PAROXYSMAL ATRIAL FIBRILLATION (HCC): ICD-10-CM

## 2018-05-01 DIAGNOSIS — N18.30 CKD STAGE 3 SECONDARY TO DIABETES (HCC): ICD-10-CM

## 2018-05-01 DIAGNOSIS — E78.2 MIXED HYPERLIPIDEMIA: ICD-10-CM

## 2018-05-01 DIAGNOSIS — E11.9 TYPE 2 DIABETES MELLITUS WITHOUT COMPLICATION, WITHOUT LONG-TERM CURRENT USE OF INSULIN (HCC): ICD-10-CM

## 2018-05-01 DIAGNOSIS — I10 ESSENTIAL HYPERTENSION: Primary | ICD-10-CM

## 2018-05-01 DIAGNOSIS — Z79.01 CHRONIC ANTICOAGULATION: ICD-10-CM

## 2018-05-01 DIAGNOSIS — E11.22 CKD STAGE 3 SECONDARY TO DIABETES (HCC): ICD-10-CM

## 2018-05-01 PROCEDURE — 99214 OFFICE O/P EST MOD 30 MIN: CPT | Performed by: INTERNAL MEDICINE

## 2018-05-01 RX ORDER — LOSARTAN POTASSIUM 50 MG/1
25 TABLET ORAL DAILY
Qty: 90 TABLET | Refills: 1 | Status: ON HOLD | OUTPATIENT
Start: 2018-05-01 | End: 2018-09-12

## 2018-05-01 NOTE — PROGRESS NOTES
subjective     Chief Complaint   Patient presents with   • Follow-up     Lab Results   • Hypertension   • Atrial Fibrillation   • Diabetes     History of Present Illness    Patient is 67 years old white male who states that lately he has been dizzy.  When he gets up suddenly he feels dizzy.  It goes away after a few seconds.    Heart rate and blood pressure has been running low.  Patient has history of paroxysmal atrial fibrillation he is doing much better with Tambocor therapy denies any palpitations.    He is anticoagulated with eliquis 5 mg twice a day without any side effects.    Patient also has hyperlipidemia and is taking Lipitor 10 mg daily.  No drug side effects.  Patient also has diabetes mellitus.  He is taking Actos.  Sugar has been well controlled.  Patient had lab work will be discussed today.    Past Surgical History:   Procedure Laterality Date   • CARDIAC CATHETERIZATION  11/01/1999   • CARDIOVASCULAR STRESS TEST  09/2012   • ECHO - CONVERTED  09/2012   • KIDNEY STONE SURGERY     • UPPER GASTROINTESTINAL ENDOSCOPY  08/30/2012     Family History   Problem Relation Age of Onset   • Heart attack Mother    • Heart disease Mother    • Stroke Mother    • Kidney disease Mother    • Heart failure Mother    • Lung disease Father    • Tuberculosis Father    • Diabetes Sister    • Heart attack Brother    • Heart failure Brother    • Heart disease Brother    • Diabetes Sister    • No Known Problems Brother    • No Known Problems Brother      Past Medical History:   Diagnosis Date   • Abnormal ECG    • Antral gastritis    • Atrial fibrillation    • Borderline diabetes mellitus    • Chest pain    • COPD (chronic obstructive pulmonary disease)    • Coronary artery disease    • Diabetes mellitus    • Duodenitis    • Emphysema of lung    • History of EKG 03/28/2016    NORMAL   • Hyperlipidemia    • Hypertension    • Kidney stone    • Obesity    • Palpitations    • Reflux esophagitis    • Renal failure     MILD      Patient Active Problem List   Diagnosis   • Hyperlipidemia   • COPD (chronic obstructive pulmonary disease)   • Essential hypertension   • Gout without tophus   • Anxiety and depression   • Primary insomnia   • Gastroesophageal reflux disease without esophagitis   • Nonobstructive atherosclerosis of coronary artery   • CKD stage 3 secondary to diabetes   • DM2 (diabetes mellitus, type 2)   • Paroxysmal atrial fibrillation   • Chronic anticoagulation, eliquis   • Encounter for monitoring flecainide therapy   • Preoperative clearance       Social History   Substance Use Topics   • Smoking status: Never Smoker   • Smokeless tobacco: Never Used   • Alcohol use No      Comment: s/p 1975       Allergies   Allergen Reactions   • Morphine And Related      Morphine caused pt to isaac       Current Outpatient Prescriptions on File Prior to Visit   Medication Sig   • acetaminophen (TYLENOL) 325 MG tablet Take 325 mg by mouth 4 (Four) Times a Day As Needed for Mild Pain .   • albuterol (PROVENTIL HFA;VENTOLIN HFA) 108 (90 BASE) MCG/ACT inhaler Inhale 2 puffs. EVERY 4-6 HOURS   • apixaban (ELIQUIS) 5 MG tablet tablet Take 1 tablet by mouth Every 12 (Twelve) Hours.   • aspirin 81 MG tablet Take 81 mg by mouth daily.   • atorvastatin (LIPITOR) 10 MG tablet Take 5 mg by mouth daily.   • cholecalciferol (VITAMIN D3) 1000 units tablet Take 1,000 Units by mouth Daily.   • colchicine 0.6 MG tablet Take 1 tablet by mouth Daily.   • flecainide (TAMBOCOR) 50 MG tablet Take 1 tablet by mouth Every 12 (Twelve) Hours.   • FLUoxetine (PROzac) 20 MG capsule Take 20 mg by mouth 3 (three) times a day.   • mometasone (ASMANEX) 220 MCG/INH inhaler Inhale 2 puffs 1 (one) time daily.   • montelukast (SINGULAIR) 10 MG tablet Take 10 mg by mouth daily.   • nitroglycerin (NITROSTAT) 0.4 MG SL tablet Place 0.4 mg under the tongue as needed for chest pain.   • OLODATEROL HCL IN Inhale 2 puffs Daily.   • pantoprazole (PROTONIX) 40 MG EC tablet Take 40  "mg by mouth 2 (two) times a day.   • pioglitazone (ACTOS) 15 MG tablet Take 30 mg by mouth Daily.   • polyethylene glycol (MIRALAX) packet Take 17 g by mouth daily.   • Tiotropium Bromide-Olodaterol 2.5-2.5 MCG/ACT aerosol solution Inhale 2 puffs Daily.     No current facility-administered medications on file prior to visit.          The following portions of the patient's history were reviewed and updated as appropriate: allergies, current medications, past family history, past medical history, past social history, past surgical history and problem list.    Review of Systems   Constitution: Negative.   HENT: Negative.  Negative for congestion.    Eyes: Negative.    Cardiovascular: Negative.  Negative for chest pain, cyanosis, dyspnea on exertion, irregular heartbeat, leg swelling, near-syncope, orthopnea, palpitations, paroxysmal nocturnal dyspnea and syncope.   Respiratory: Negative.  Negative for shortness of breath.    Hematologic/Lymphatic: Negative.    Musculoskeletal: Negative.    Gastrointestinal: Negative.    Neurological: Positive for dizziness. Negative for headaches.          Objective:     /90 (BP Location: Left arm, Patient Position: Sitting, Cuff Size: Adult)   Pulse 61   Ht 180.3 cm (70.98\")   Wt 105 kg (232 lb 3.2 oz)   SpO2 96%   BMI 32.40 kg/m²   Physical Exam   Constitutional: He appears well-developed and well-nourished. No distress.   HENT:   Head: Normocephalic and atraumatic.   Mouth/Throat: Oropharynx is clear and moist. No oropharyngeal exudate.   Eyes: Conjunctivae and EOM are normal. Pupils are equal, round, and reactive to light. No scleral icterus.   Neck: Normal range of motion. Neck supple. No JVD present. No tracheal deviation present. No thyromegaly present.   Cardiovascular: Normal rate, regular rhythm, normal heart sounds and intact distal pulses.  PMI is not displaced.  Exam reveals no gallop, no friction rub and no decreased pulses.    No murmur heard.  Pulses:       " Carotid pulses are 3+ on the right side, and 3+ on the left side.       Radial pulses are 3+ on the right side, and 3+ on the left side.   Pulmonary/Chest: Effort normal and breath sounds normal. No respiratory distress. He has no wheezes. He has no rales. He exhibits no tenderness.   Abdominal: Soft. Bowel sounds are normal. He exhibits no distension, no abdominal bruit and no mass. There is no splenomegaly or hepatomegaly. There is no tenderness. There is no rebound and no guarding.   Musculoskeletal: Normal range of motion. He exhibits no edema, tenderness or deformity.   Lymphadenopathy:     He has no cervical adenopathy.   Neurological: He is alert. He has normal reflexes. No cranial nerve deficit. He exhibits normal muscle tone. Coordination normal.   Skin: Skin is warm and dry. No rash noted. He is not diaphoretic. No erythema.   Psychiatric: He has a normal mood and affect. His behavior is normal. Judgment and thought content normal.         Lab Review  Lab Results   Component Value Date     04/27/2018    K 5.1 04/27/2018     04/27/2018    BUN 23 (H) 04/27/2018    CREATININE 1.55 (H) 04/27/2018    GLUCOSE 119 (H) 04/27/2018    CALCIUM 9.8 04/27/2018    ALT 29 04/27/2018    ALKPHOS 69 04/27/2018    LABIL2 1.5 04/27/2018     Lab Results   Component Value Date    CKTOTAL 85 04/27/2018     Lab Results   Component Value Date    WBC 4.98 04/27/2018    HGB 15.1 04/27/2018    HCT 44.9 04/27/2018     04/27/2018     Lab Results   Component Value Date    INR 0.92 10/22/2017    INR <0.90 08/30/2016     Lab Results   Component Value Date    MG 2.0 10/23/2017     Lab Results   Component Value Date    PSA 1.450 07/11/2017    TSH 2.184 10/22/2017     No results found for: BNP  Lab Results   Component Value Date    CHLPL 144 03/25/2016    CHOL 128 04/27/2018    TRIG 140 04/27/2018    HDL 41 (L) 04/27/2018    VLDL 28 04/27/2018    LDLHDL 1.44 04/27/2018     Lab Results   Component Value Date    LDL 59  04/27/2018    LDL 88 10/23/2017       Procedures       I personally viewed and interpreted the patient's LAB data         Assessment:     1. Essential hypertension    2. Mixed hyperlipidemia    3. Paroxysmal atrial fibrillation    4. Chronic anticoagulation, eliquis    5. CKD stage 3 secondary to diabetes    6. Chronic obstructive pulmonary disease, unspecified COPD type    7. Type 2 diabetes mellitus without complication, without long-term current use of insulin          Plan:      Labs reviewed and discussed with the patient  Lipid panel is normal with total cholesterol 128 and LDL 59.    Blood sugar is 119  Renal functions are abnormal with creatinine 1.55  Dizziness is due to postural hypotension and bradycardia.  Patient was advised to decrease Lopressor 12.5 twice a day.  He will decrease losartan 25 mg daily.  Tambocor will be continued.  Patient was advised to call with symptoms and reading of blood pressure and heart rate.  Renal function abnormal however it is not getting any worse.  Tambocor will be continued.  Sugar control is also better.  Follow-up scheduled          Return in about 3 months (around 8/1/2018).

## 2018-06-20 ENCOUNTER — TRANSCRIBE ORDERS (OUTPATIENT)
Dept: ADMINISTRATIVE | Facility: HOSPITAL | Age: 68
End: 2018-06-20

## 2018-06-20 ENCOUNTER — LAB (OUTPATIENT)
Dept: LAB | Facility: HOSPITAL | Age: 68
End: 2018-06-20
Attending: INTERNAL MEDICINE

## 2018-06-20 DIAGNOSIS — M10.9 PODAGRA: ICD-10-CM

## 2018-06-20 DIAGNOSIS — N18.30 CHRONIC KIDNEY DISEASE, STAGE III (MODERATE) (HCC): Primary | ICD-10-CM

## 2018-06-20 DIAGNOSIS — N18.30 CHRONIC KIDNEY DISEASE, STAGE III (MODERATE) (HCC): ICD-10-CM

## 2018-06-20 LAB
ALBUMIN SERPL-MCNC: 4.5 G/DL (ref 3.4–4.8)
ALBUMIN UR-MCNC: 2.1 MG/L
ALBUMIN/GLOB SERPL: 1.8 G/DL (ref 1.5–2.5)
ALP SERPL-CCNC: 72 U/L (ref 40–129)
ALT SERPL W P-5'-P-CCNC: 30 U/L (ref 10–44)
ANION GAP SERPL CALCULATED.3IONS-SCNC: 6 MMOL/L (ref 3.6–11.2)
AST SERPL-CCNC: 28 U/L (ref 10–34)
BILIRUB SERPL-MCNC: 0.7 MG/DL (ref 0.2–1.8)
BILIRUB UR QL STRIP: NEGATIVE
BUN BLD-MCNC: 19 MG/DL (ref 7–21)
BUN/CREAT SERPL: 12.3 (ref 7–25)
CALCIUM SPEC-SCNC: 9.6 MG/DL (ref 7.7–10)
CHLORIDE SERPL-SCNC: 106 MMOL/L (ref 99–112)
CLARITY UR: CLEAR
CO2 SERPL-SCNC: 27 MMOL/L (ref 24.3–31.9)
COLOR UR: YELLOW
CREAT BLD-MCNC: 1.54 MG/DL (ref 0.43–1.29)
CREAT UR-MCNC: 118.1 MG/DL
GFR SERPL CREATININE-BSD FRML MDRD: 45 ML/MIN/1.73
GLOBULIN UR ELPH-MCNC: 2.5 GM/DL
GLUCOSE BLD-MCNC: 108 MG/DL (ref 70–110)
GLUCOSE UR STRIP-MCNC: NEGATIVE MG/DL
HGB UR QL STRIP.AUTO: NEGATIVE
KETONES UR QL STRIP: NEGATIVE
LEUKOCYTE ESTERASE UR QL STRIP.AUTO: NEGATIVE
MICROALBUMIN/CREAT UR: 1.8 MG/G
NITRITE UR QL STRIP: NEGATIVE
OSMOLALITY SERPL CALC.SUM OF ELEC: 280.3 MOSM/KG (ref 273–305)
PH UR STRIP.AUTO: 7.5 [PH] (ref 5–8)
POTASSIUM BLD-SCNC: 4.6 MMOL/L (ref 3.5–5.3)
PROT SERPL-MCNC: 7 G/DL (ref 6–8)
PROT UR QL STRIP: NEGATIVE
SODIUM BLD-SCNC: 139 MMOL/L (ref 135–153)
SP GR UR STRIP: 1.02 (ref 1–1.03)
URATE SERPL-MCNC: 8.2 MG/DL (ref 3.7–7)
UROBILINOGEN UR QL STRIP: NORMAL

## 2018-06-20 PROCEDURE — 36415 COLL VENOUS BLD VENIPUNCTURE: CPT

## 2018-06-20 PROCEDURE — 82570 ASSAY OF URINE CREATININE: CPT

## 2018-06-20 PROCEDURE — 80053 COMPREHEN METABOLIC PANEL: CPT

## 2018-06-20 PROCEDURE — 84550 ASSAY OF BLOOD/URIC ACID: CPT

## 2018-06-20 PROCEDURE — 81003 URINALYSIS AUTO W/O SCOPE: CPT

## 2018-06-20 PROCEDURE — 82043 UR ALBUMIN QUANTITATIVE: CPT

## 2018-07-11 PROCEDURE — 99284 EMERGENCY DEPT VISIT MOD MDM: CPT

## 2018-07-11 PROCEDURE — 96372 THER/PROPH/DIAG INJ SC/IM: CPT

## 2018-07-12 ENCOUNTER — APPOINTMENT (OUTPATIENT)
Dept: GENERAL RADIOLOGY | Facility: HOSPITAL | Age: 68
End: 2018-07-12

## 2018-07-12 ENCOUNTER — HOSPITAL ENCOUNTER (EMERGENCY)
Facility: HOSPITAL | Age: 68
Discharge: HOME | End: 2018-07-12
Attending: EMERGENCY MEDICINE | Admitting: EMERGENCY MEDICINE

## 2018-07-12 VITALS
TEMPERATURE: 97.5 F | HEIGHT: 71 IN | HEART RATE: 66 BPM | BODY MASS INDEX: 32.2 KG/M2 | RESPIRATION RATE: 18 BRPM | SYSTOLIC BLOOD PRESSURE: 130 MMHG | WEIGHT: 230 LBS | DIASTOLIC BLOOD PRESSURE: 80 MMHG | OXYGEN SATURATION: 99 %

## 2018-07-12 DIAGNOSIS — M51.36 DEGENERATION OF LUMBAR INTERVERTEBRAL DISC: Primary | ICD-10-CM

## 2018-07-12 LAB
BILIRUB UR QL STRIP: NEGATIVE
CLARITY UR: CLEAR
COLOR UR: YELLOW
GLUCOSE UR STRIP-MCNC: NEGATIVE MG/DL
HGB UR QL STRIP.AUTO: NEGATIVE
KETONES UR QL STRIP: NEGATIVE
LEUKOCYTE ESTERASE UR QL STRIP.AUTO: NEGATIVE
NITRITE UR QL STRIP: NEGATIVE
PH UR STRIP.AUTO: <=5 [PH] (ref 5–8)
PROT UR QL STRIP: NEGATIVE
SP GR UR STRIP: 1.02 (ref 1–1.03)
UROBILINOGEN UR QL STRIP: NORMAL

## 2018-07-12 PROCEDURE — 81003 URINALYSIS AUTO W/O SCOPE: CPT | Performed by: EMERGENCY MEDICINE

## 2018-07-12 PROCEDURE — 72110 X-RAY EXAM L-2 SPINE 4/>VWS: CPT | Performed by: RADIOLOGY

## 2018-07-12 PROCEDURE — 72110 X-RAY EXAM L-2 SPINE 4/>VWS: CPT

## 2018-07-12 PROCEDURE — 25010000002 KETOROLAC TROMETHAMINE PER 15 MG: Performed by: EMERGENCY MEDICINE

## 2018-07-12 PROCEDURE — 25010000002 DEXAMETHASONE PER 1 MG: Performed by: EMERGENCY MEDICINE

## 2018-07-12 PROCEDURE — 25010000002 ORPHENADRINE CITRATE PER 60 MG: Performed by: EMERGENCY MEDICINE

## 2018-07-12 RX ORDER — KETOROLAC TROMETHAMINE 30 MG/ML
60 INJECTION, SOLUTION INTRAMUSCULAR; INTRAVENOUS ONCE
Status: COMPLETED | OUTPATIENT
Start: 2018-07-12 | End: 2018-07-12

## 2018-07-12 RX ORDER — DEXAMETHASONE SODIUM PHOSPHATE 4 MG/ML
10 INJECTION, SOLUTION INTRA-ARTICULAR; INTRALESIONAL; INTRAMUSCULAR; INTRAVENOUS; SOFT TISSUE ONCE
Status: COMPLETED | OUTPATIENT
Start: 2018-07-12 | End: 2018-07-12

## 2018-07-12 RX ORDER — METAXALONE 800 MG/1
800 TABLET ORAL 3 TIMES DAILY PRN
Qty: 15 TABLET | Refills: 0 | Status: SHIPPED | OUTPATIENT
Start: 2018-07-12 | End: 2018-08-08

## 2018-07-12 RX ORDER — NABUMETONE 750 MG/1
750 TABLET, FILM COATED ORAL 2 TIMES DAILY PRN
Qty: 30 TABLET | Refills: 0 | Status: SHIPPED | OUTPATIENT
Start: 2018-07-12 | End: 2018-08-08

## 2018-07-12 RX ORDER — DEXAMETHASONE SODIUM PHOSPHATE 4 MG/ML
10 INJECTION, SOLUTION INTRA-ARTICULAR; INTRALESIONAL; INTRAMUSCULAR; INTRAVENOUS; SOFT TISSUE ONCE
Status: DISCONTINUED | OUTPATIENT
Start: 2018-07-12 | End: 2018-07-12 | Stop reason: SDUPTHER

## 2018-07-12 RX ORDER — ORPHENADRINE CITRATE 30 MG/ML
60 INJECTION INTRAMUSCULAR; INTRAVENOUS ONCE
Status: COMPLETED | OUTPATIENT
Start: 2018-07-12 | End: 2018-07-12

## 2018-07-12 RX ADMIN — ORPHENADRINE CITRATE 60 MG: 30 INJECTION INTRAMUSCULAR; INTRAVENOUS at 00:45

## 2018-07-12 RX ADMIN — DEXAMETHASONE SODIUM PHOSPHATE 10 MG: 4 INJECTION, SOLUTION INTRAMUSCULAR; INTRAVENOUS at 00:44

## 2018-07-12 RX ADMIN — KETOROLAC TROMETHAMINE 60 MG: 60 INJECTION, SOLUTION INTRAMUSCULAR at 00:44

## 2018-07-12 NOTE — ED NOTES
Patient states that his bilateral flank areas are hurting, states that the other day he picked up a gallon of water and heard a crunch and he has been having pain since then. Noted to be ambulatory, wife noted to be at bedside. NADN. WCTM. Resps even and unlabored. Call light and fall precautions in place.      Rose Mary Rodriguez RN  07/12/18 [id number]

## 2018-07-12 NOTE — ED PROVIDER NOTES
Subjective   Pt comes in with c/o worsened low back pain.  Pt has chronic low back pain from degenerative disk disease.  He was lifting buckets of water on T-4.  Pain has worsened since.  Across lower back and wrapping [id number] both sides.  No NVD.  No fever        History provided by:  Patient  Back Pain   Location:  Lumbar spine  Quality:  Aching and stiffness  Radiates to: Bilateral flanks.  Pain severity:  Severe  Onset quality:  Sudden  Duration:  3 days  Timing:  Constant  Progression:  Worsening  Chronicity:  Chronic  Context: lifting heavy objects, recent injury and twisting    Context: not emotional stress, not falling, not jumping from heights, not MCA, not MVA, not occupational injury, not pedestrian accident, not physical stress and not recent illness    Relieved by:  Nothing  Worsened by:  Bending, bowel movement and palpation  Ineffective treatments:  None tried  Associated symptoms: abdominal pain    Associated symptoms: no abdominal swelling, no bladder incontinence, no bowel incontinence, no chest pain, no dysuria, no fever, no headaches, no leg pain, no numbness, no paresthesias, no pelvic pain, no perianal numbness, no tingling, no weakness and no weight loss    Risk factors: no hx of cancer, no hx of osteoporosis, no lack of exercise, no menopause, not obese, not pregnant, no recent surgery, no steroid use and no vascular disease        Review of Systems   Constitutional: Negative.  Negative for fever and weight loss.   HENT: Negative.    Eyes: Negative.    Respiratory: Negative.    Cardiovascular: Negative.  Negative for chest pain.   Gastrointestinal: Positive for abdominal pain. Negative for bowel incontinence.   Endocrine: Negative.    Genitourinary: Negative.  Negative for bladder incontinence, difficulty urinating, dysuria and pelvic pain.   Musculoskeletal: Positive for back pain.   Skin: Negative.    Allergic/Immunologic: Negative.    Neurological: Negative.  Negative for tingling,  weakness, numbness, headaches and paresthesias.   Hematological: Negative.    Psychiatric/Behavioral: Negative.    All other systems reviewed and are negative.      Past Medical History:   Diagnosis Date   • Abnormal ECG    • Antral gastritis    • Atrial fibrillation (CMS/HCC)    • Borderline diabetes mellitus    • Chest pain    • COPD (chronic obstructive pulmonary disease) (CMS/HCC)    • Coronary artery disease    • Diabetes mellitus (CMS/HCC)    • Duodenitis    • Emphysema of lung (CMS/HCC)    • History of EKG 03/28/2016    NORMAL   • Hyperlipidemia    • Hypertension    • Kidney stone    • Obesity    • Palpitations    • Reflux esophagitis    • Renal failure     MILD       Allergies   Allergen Reactions   • Morphine And Related      Morphine caused pt to isaac       Past Surgical History:   Procedure Laterality Date   • CARDIAC CATHETERIZATION  11/01/1999   • CARDIOVASCULAR STRESS TEST  09/2012   • ECHO - CONVERTED  09/2012   • KIDNEY STONE SURGERY     • UPPER GASTROINTESTINAL ENDOSCOPY  08/30/2012       Family History   Problem Relation Age of Onset   • Heart attack Mother    • Heart disease Mother    • Stroke Mother    • Kidney disease Mother    • Heart failure Mother    • Lung disease Father    • Tuberculosis Father    • Diabetes Sister    • Heart attack Brother    • Heart failure Brother    • Heart disease Brother    • Diabetes Sister    • No Known Problems Brother    • No Known Problems Brother        Social History     Social History   • Marital status:      Social History Main Topics   • Smoking status: Never Smoker   • Smokeless tobacco: Never Used   • Alcohol use No      Comment: s/p 1975   • Drug use: No   • Sexual activity: Defer     Other Topics Concern   • Not on file           Objective   Physical Exam   Constitutional: He is oriented to person, place, and time. He appears well-developed and well-nourished. No distress.   Appears to be in discomfort   HENT:   Head: Normocephalic and  atraumatic.   Nose: Nose normal.   Mouth/Throat: Oropharynx is clear and moist.   Eyes: Conjunctivae and EOM are normal. Right eye exhibits no discharge. Left eye exhibits no discharge. No scleral icterus.   Neck: Normal range of motion. Neck supple. No JVD present. No tracheal deviation present. No thyromegaly present.   Cardiovascular: Normal rate, regular rhythm, normal heart sounds and intact distal pulses.  Exam reveals no gallop and no friction rub.    No murmur heard.  Pulmonary/Chest: Effort normal and breath sounds normal. No stridor. No respiratory distress. He has no wheezes. He has no rales.   Abdominal: Soft. Bowel sounds are normal. He exhibits no distension and no mass. There is no tenderness. There is no guarding.   Genitourinary:   Genitourinary Comments: B-CVAT   Musculoskeletal: He exhibits no deformity.   Pain with trunk movements  Diffuse lumbar TTP   Lymphadenopathy:     He has no cervical adenopathy.   Neurological: He is alert and oriented to person, place, and time. He exhibits normal muscle tone. Coordination normal.   Skin: Skin is warm and dry. Capillary refill takes less than 2 seconds. No pallor.   Psychiatric: He has a normal mood and affect. His behavior is normal. Judgment and thought content normal.   Nursing note and vitals reviewed.      Procedures           ED Course      XR Spine Lumbar 4+ View   ED Interpretation   Lumbar spine series   My read   Advanced degenerative disc and joint disease.  No apparent acute fracture or subluxation.        Labs Reviewed   URINALYSIS W/ MICROSCOPIC IF INDICATED (NO CULTURE) - Normal    Narrative:     Urine microscopic not indicated.        Medication List      START taking these medications    metaxalone 800 MG tablet  Commonly known as:  SKELAXIN  Take 1 tablet by mouth 3 (Three) Times a Day As Needed for Muscle Spasms.     nabumetone 750 MG tablet  Commonly known as:  RELAFEN  Take 1 tablet by mouth 2 (Two) Times a Day As Needed for Moderate  Pain .        CONTINUE taking these medications    acetaminophen 325 MG tablet  Commonly known as:  TYLENOL     albuterol 108 (90 Base) MCG/ACT inhaler  Commonly known as:  PROVENTIL HFA;VENTOLIN HFA     apixaban 5 MG tablet tablet  Commonly known as:  ELIQUIS  Take 1 tablet by mouth Every 12 (Twelve) Hours.     aspirin 81 MG tablet     atorvastatin 10 MG tablet  Commonly known as:  LIPITOR     cholecalciferol 1000 units tablet  Commonly known as:  VITAMIN D3     colchicine 0.6 MG tablet  Take 1 tablet by mouth Daily.     flecainide 50 MG tablet  Commonly known as:  TAMBOCOR  Take 1 tablet by mouth Every 12 (Twelve) Hours.     FLUoxetine 20 MG capsule  Commonly known as:  PROzac     losartan 50 MG tablet  Commonly known as:  COZAAR  Take 0.5 tablets by mouth Daily.     metoprolol tartrate 25 MG tablet  Commonly known as:  LOPRESSOR  Take 1 tablet by mouth 2 (Two) Times a Day.     mometasone 220 MCG/INH inhaler  Commonly known as:  ASMANEX     montelukast 10 MG tablet  Commonly known as:  SINGULAIR     nitroglycerin 0.4 MG SL tablet  Commonly known as:  NITROSTAT     OLODATEROL HCL IN     pantoprazole 40 MG EC tablet  Commonly known as:  PROTONIX     pioglitazone 15 MG tablet  Commonly known as:  ACTOS     polyethylene glycol packet  Commonly known as:  MIRALAX     tiotropium bromide-olodaterol 2.5-2.5 MCG/ACT aerosol solution inhaler  Commonly known as:  STIOLTO RESPIMAT              MDM  Number of Diagnoses or Management Options  Degeneration of lumbar intervertebral disc: established and worsening     Amount and/or Complexity of Data Reviewed  Tests in the radiology section of CPT®: ordered and reviewed  Independent visualization of images, tracings, or specimens: yes    Risk of Complications, Morbidity, and/or Mortality  Presenting problems: high  Diagnostic procedures: moderate  Management options: moderate    Patient Progress  Patient progress: improved        Final diagnoses:   Degeneration of lumbar  intervertebral disc            Tyree Carballo MD  07/12/18 [id number]

## 2018-07-13 ENCOUNTER — TRANSCRIBE ORDERS (OUTPATIENT)
Dept: ADMINISTRATIVE | Facility: HOSPITAL | Age: 68
End: 2018-07-13

## 2018-07-13 DIAGNOSIS — R10.9 ABDOMINAL PAIN, UNSPECIFIED ABDOMINAL LOCATION: Primary | ICD-10-CM

## 2018-07-16 ENCOUNTER — TRANSCRIBE ORDERS (OUTPATIENT)
Dept: ADMINISTRATIVE | Facility: HOSPITAL | Age: 68
End: 2018-07-16

## 2018-07-16 ENCOUNTER — HOSPITAL ENCOUNTER (OUTPATIENT)
Dept: ULTRASOUND IMAGING | Facility: HOSPITAL | Age: 68
Discharge: HOME | End: 2018-07-16
Admitting: INTERNAL MEDICINE

## 2018-07-16 DIAGNOSIS — R10.11 ABDOMINAL PAIN, RIGHT UPPER QUADRANT: Primary | ICD-10-CM

## 2018-07-16 DIAGNOSIS — R10.9 ABDOMINAL PAIN, UNSPECIFIED ABDOMINAL LOCATION: ICD-10-CM

## 2018-07-16 PROCEDURE — 76700 US EXAM ABDOM COMPLETE: CPT

## 2018-07-16 PROCEDURE — 76700 US EXAM ABDOM COMPLETE: CPT | Performed by: RADIOLOGY

## 2018-07-18 ENCOUNTER — HOSPITAL ENCOUNTER (OUTPATIENT)
Dept: NUCLEAR MEDICINE | Facility: HOSPITAL | Age: 68
Discharge: HOME | End: 2018-07-18

## 2018-07-18 DIAGNOSIS — R10.11 ABDOMINAL PAIN, RIGHT UPPER QUADRANT: ICD-10-CM

## 2018-07-18 PROCEDURE — 78227 HEPATOBIL SYST IMAGE W/DRUG: CPT | Performed by: RADIOLOGY

## 2018-07-18 PROCEDURE — 78227 HEPATOBIL SYST IMAGE W/DRUG: CPT

## 2018-07-18 PROCEDURE — 25010000002 SINCALIDE PER 5 MCG: Performed by: INTERNAL MEDICINE

## 2018-07-18 PROCEDURE — 0 TECHNETIUM TC 99M MEBROFENIN KIT: Performed by: INTERNAL MEDICINE

## 2018-07-18 PROCEDURE — A9537 TC99M MEBROFENIN: HCPCS | Performed by: INTERNAL MEDICINE

## 2018-07-18 RX ORDER — KIT FOR THE PREPARATION OF TECHNETIUM TC 99M MEBROFENIN 45 MG/10ML
1 INJECTION, POWDER, LYOPHILIZED, FOR SOLUTION INTRAVENOUS
Status: COMPLETED | OUTPATIENT
Start: 2018-07-18 | End: 2018-07-18

## 2018-07-18 RX ADMIN — MEBROFENIN 1 DOSE: 45 INJECTION, POWDER, LYOPHILIZED, FOR SOLUTION INTRAVENOUS at 13:25

## 2018-07-18 RX ADMIN — SINCALIDE 4.2 MCG: 5 INJECTION, POWDER, LYOPHILIZED, FOR SOLUTION INTRAVENOUS at 14:15

## 2018-07-25 ENCOUNTER — OFFICE VISIT (OUTPATIENT)
Dept: SURGERY | Facility: CLINIC | Age: 68
End: 2018-07-25

## 2018-07-25 VITALS
BODY MASS INDEX: 32.2 KG/M2 | HEART RATE: 98 BPM | SYSTOLIC BLOOD PRESSURE: 111 MMHG | WEIGHT: 230 LBS | HEIGHT: 71 IN | DIASTOLIC BLOOD PRESSURE: 66 MMHG

## 2018-07-25 DIAGNOSIS — K82.8 BILIARY DYSKINESIA: ICD-10-CM

## 2018-07-25 DIAGNOSIS — I48.0 PAROXYSMAL ATRIAL FIBRILLATION (HCC): Primary | ICD-10-CM

## 2018-07-25 PROCEDURE — 99204 OFFICE O/P NEW MOD 45 MIN: CPT | Performed by: SURGERY

## 2018-07-26 PROBLEM — K82.8 BILIARY DYSKINESIA: Status: ACTIVE | Noted: 2018-07-26

## 2018-07-26 NOTE — PROGRESS NOTES
Subjective   Todd Phillips is a 68 y.o. male is being seen for consultation today at the request of Adiel Denney MD    Todd Phillips is a 68 y.o. male68 yo M with RUQ pain with fatty and greasy meals.  HIDA scan shows decreased ejection fraction.  Patient has atrial fibrillation on chronic anticoagulation.  No current angina or other cardiac symptoms.  No vomiting or diarrhea.  No fever or jaundice.        Past Medical History:   Diagnosis Date   • Abnormal ECG    • Antral gastritis    • Atrial fibrillation (CMS/HCC)    • Borderline diabetes mellitus    • Chest pain    • COPD (chronic obstructive pulmonary disease) (CMS/HCC)    • Coronary artery disease    • Diabetes mellitus (CMS/HCC)    • Duodenitis    • Emphysema of lung (CMS/HCC)    • History of EKG 03/28/2016    NORMAL   • Hyperlipidemia    • Hypertension    • Kidney stone    • Obesity    • Palpitations    • Reflux esophagitis    • Renal failure     MILD       Family History   Problem Relation Age of Onset   • Heart attack Mother    • Heart disease Mother    • Stroke Mother    • Kidney disease Mother    • Heart failure Mother    • Lung disease Father    • Tuberculosis Father    • Diabetes Sister    • Heart attack Brother    • Heart failure Brother    • Heart disease Brother    • Diabetes Sister    • No Known Problems Brother    • No Known Problems Brother        Social History     Social History   • Marital status:      Spouse name: N/A   • Number of children: N/A   • Years of education: N/A     Occupational History   • Not on file.     Social History Main Topics   • Smoking status: Never Smoker   • Smokeless tobacco: Never Used   • Alcohol use No      Comment: s/p 1975   • Drug use: No   • Sexual activity: Defer     Other Topics Concern   • Not on file     Social History Narrative   • No narrative on file       Past Surgical History:   Procedure Laterality Date   • CARDIAC CATHETERIZATION  11/01/1999   • CARDIOVASCULAR STRESS TEST  09/2012   •  "ECHO - CONVERTED  09/2012   • KIDNEY STONE SURGERY     • UPPER GASTROINTESTINAL ENDOSCOPY  08/30/2012       Review of Systems   Constitutional: Negative for activity change, appetite change, chills and fever.   HENT: Negative for sore throat and trouble swallowing.    Eyes: Negative for visual disturbance.   Respiratory: Negative for cough and shortness of breath.    Cardiovascular: Negative for chest pain and palpitations.   Gastrointestinal: Negative for abdominal distention, abdominal pain, blood in stool, constipation, diarrhea, nausea and vomiting.   Endocrine: Negative for cold intolerance and heat intolerance.   Genitourinary: Negative for dysuria.   Musculoskeletal: Negative for joint swelling.   Skin: Negative for color change, rash and wound.   Allergic/Immunologic: Negative for immunocompromised state.   Neurological: Negative for dizziness, seizures, weakness and headaches.   Hematological: Negative for adenopathy. Does not bruise/bleed easily.   Psychiatric/Behavioral: Negative for agitation and confusion.         /66   Pulse 98   Ht 180.3 cm (71\")   Wt 104 kg (230 lb)   BMI 32.08 kg/m²   Objective   Physical Exam   Constitutional: He is oriented to person, place, and time. He appears well-developed.   HENT:   Head: Normocephalic and atraumatic.   Mouth/Throat: Mucous membranes are normal.   Eyes: Pupils are equal, round, and reactive to light. Conjunctivae are normal.   Neck: Neck supple. No JVD present. No tracheal deviation present. No thyromegaly present.   Cardiovascular: Normal rate and regular rhythm.  Exam reveals no gallop and no friction rub.    No murmur heard.  Pulmonary/Chest: Effort normal and breath sounds normal.   Abdominal: Soft. He exhibits no distension. There is no splenomegaly or hepatomegaly. There is no tenderness. No hernia.   Musculoskeletal: Normal range of motion. He exhibits no deformity.   Neurological: He is alert and oriented to person, place, and time.   Skin: " Skin is warm and dry.   Psychiatric: He has a normal mood and affect.         Todd was seen today for gallbladder dysfunction.    Diagnoses and all orders for this visit:    Paroxysmal atrial fibrillation (CMS/HCC)    Biliary dyskinesia        Assessment     Todd Phillips is a 68 y.o. male with biliary dyskinesia.  The patient will obtain cardiac clearance and follow up to schedule surgery after clearance has been obtained.    Patient's Body mass index is 32.08 kg/m². BMI is above normal parameters. Recommendations include: educational material.

## 2018-07-27 DIAGNOSIS — Z01.818 PREOPERATIVE CLEARANCE: Primary | ICD-10-CM

## 2018-07-30 ENCOUNTER — CONSULT (OUTPATIENT)
Dept: CARDIOLOGY | Facility: CLINIC | Age: 68
End: 2018-07-30

## 2018-07-30 VITALS
HEIGHT: 71 IN | BODY MASS INDEX: 32.34 KG/M2 | HEART RATE: 59 BPM | OXYGEN SATURATION: 92 % | WEIGHT: 231 LBS | DIASTOLIC BLOOD PRESSURE: 76 MMHG | SYSTOLIC BLOOD PRESSURE: 122 MMHG

## 2018-07-30 DIAGNOSIS — Z79.899 ENCOUNTER FOR MONITORING FLECAINIDE THERAPY: ICD-10-CM

## 2018-07-30 DIAGNOSIS — I10 ESSENTIAL HYPERTENSION: ICD-10-CM

## 2018-07-30 DIAGNOSIS — Z79.01 CHRONIC ANTICOAGULATION: ICD-10-CM

## 2018-07-30 DIAGNOSIS — E66.9 OBESITY (BMI 30.0-34.9): ICD-10-CM

## 2018-07-30 DIAGNOSIS — Z01.818 PREOPERATIVE CLEARANCE: Primary | ICD-10-CM

## 2018-07-30 DIAGNOSIS — E78.2 MIXED HYPERLIPIDEMIA: ICD-10-CM

## 2018-07-30 DIAGNOSIS — Z51.81 ENCOUNTER FOR MONITORING FLECAINIDE THERAPY: ICD-10-CM

## 2018-07-30 DIAGNOSIS — I48.0 PAROXYSMAL ATRIAL FIBRILLATION (HCC): ICD-10-CM

## 2018-07-30 PROCEDURE — 93000 ELECTROCARDIOGRAM COMPLETE: CPT | Performed by: INTERNAL MEDICINE

## 2018-07-30 PROCEDURE — 99214 OFFICE O/P EST MOD 30 MIN: CPT | Performed by: INTERNAL MEDICINE

## 2018-07-30 NOTE — PROGRESS NOTES
subjective     Chief Complaint   Patient presents with   • Surgical Clearance     Gallbladder Surgery Pending      History of Present Illness  Patient is 68 years old white male who is planning to have cholecystectomy and is here for preop  Clearance.  Patient has history of paroxysmal atrial fibrillation.  He is currently in sinus rhythm on Tambocor therapy.  He denies any dizziness syncope or presyncope.    He is also anticoagulated with eliquis 5 mg twice a day with no drug side effects.    Patient has no chest pain palpitations or shortness of breath.    Blood pressure apparently has been very well controlled.  Patient also has diabetes mellitus on oral hypoglycemic agents fairly well controlled.  Lipids are also controlled.  Patient is overweight    Past Surgical History:   Procedure Laterality Date   • CARDIAC CATHETERIZATION  11/01/1999   • CARDIOVASCULAR STRESS TEST  09/2012   • ECHO - CONVERTED  09/2012   • KIDNEY STONE SURGERY     • UPPER GASTROINTESTINAL ENDOSCOPY  08/30/2012     Family History   Problem Relation Age of Onset   • Heart attack Mother    • Heart disease Mother    • Stroke Mother    • Kidney disease Mother    • Heart failure Mother    • Lung disease Father    • Tuberculosis Father    • Diabetes Sister    • Heart attack Brother    • Heart failure Brother    • Heart disease Brother    • Diabetes Sister    • No Known Problems Brother    • No Known Problems Brother      Past Medical History:   Diagnosis Date   • Abnormal ECG    • Antral gastritis    • Atrial fibrillation (CMS/HCC)    • Borderline diabetes mellitus    • Chest pain    • COPD (chronic obstructive pulmonary disease) (CMS/HCC)    • Coronary artery disease    • Diabetes mellitus (CMS/HCC)    • Duodenitis    • Emphysema of lung (CMS/HCC)    • History of EKG 03/28/2016    NORMAL   • Hyperlipidemia    • Hypertension    • Kidney stone    • Obesity    • Palpitations    • Reflux esophagitis    • Renal failure     MILD     Patient Active  Problem List   Diagnosis   • Hyperlipidemia   • COPD (chronic obstructive pulmonary disease) (CMS/[facility])   • Essential hypertension   • Gout without tophus   • Anxiety and depression   • Primary insomnia   • Gastroesophageal reflux disease without esophagitis   • Nonobstructive atherosclerosis of coronary artery   • CKD stage 3 secondary to diabetes (CMS/[facility])   • DM2 (diabetes mellitus, type 2) (CMS/[facility])   • Paroxysmal atrial fibrillation   • Chronic anticoagulation, eliquis   • Encounter for monitoring flecainide therapy   • Preoperative clearance   • Biliary dyskinesia   • Obesity (BMI 30.0-34.9)       Social History   Substance Use Topics   • Smoking status: Never Smoker   • Smokeless tobacco: Never Used   • Alcohol use No      Comment: s/p 1975       Allergies   Allergen Reactions   • Morphine And Related      Morphine caused pt to isaac       Current Outpatient Prescriptions on File Prior to Visit   Medication Sig   • acetaminophen (TYLENOL) 325 MG tablet Take 325 mg by mouth 4 (Four) Times a Day As Needed for Mild Pain .   • albuterol (PROVENTIL HFA;VENTOLIN HFA) 108 (90 BASE) MCG/ACT inhaler Inhale 2 puffs. EVERY 4-6 HOURS   • apixaban (ELIQUIS) 5 MG tablet tablet Take 1 tablet by mouth Every 12 (Twelve) Hours.   • aspirin 81 MG tablet Take 81 mg by mouth daily.   • atorvastatin (LIPITOR) 10 MG tablet Take 5 mg by mouth daily.   • cholecalciferol (VITAMIN D3) 1000 units tablet Take 1,000 Units by mouth Daily.   • colchicine 0.6 MG tablet Take 1 tablet by mouth Daily.   • flecainide (TAMBOCOR) 50 MG tablet Take 1 tablet by mouth Every 12 (Twelve) Hours.   • FLUoxetine (PROzac) 20 MG capsule Take 20 mg by mouth 3 (three) times a day.   • losartan (COZAAR) 50 MG tablet Take 0.5 tablets by mouth Daily.   • metaxalone (SKELAXIN) 800 MG tablet Take 1 tablet by mouth 3 (Three) Times a Day As Needed for Muscle Spasms.   • metoprolol tartrate (LOPRESSOR) 25 MG tablet Take 1 tablet by mouth 2 (Two) Times a Day.   •  "mometasone (ASMANEX) 220 MCG/INH inhaler Inhale 2 puffs 1 (one) time daily.   • montelukast (SINGULAIR) 10 MG tablet Take 10 mg by mouth daily.   • nabumetone (RELAFEN) 750 MG tablet Take 1 tablet by mouth 2 (Two) Times a Day As Needed for Moderate Pain .   • nitroglycerin (NITROSTAT) 0.4 MG SL tablet Place 0.4 mg under the tongue as needed for chest pain.   • OLODATEROL HCL IN Inhale 2 puffs Daily.   • pantoprazole (PROTONIX) 40 MG EC tablet Take 40 mg by mouth 2 (two) times a day.   • pioglitazone (ACTOS) 15 MG tablet Take 30 mg by mouth Daily.   • polyethylene glycol (MIRALAX) packet Take 17 g by mouth daily.   • Tiotropium Bromide-Olodaterol 2.5-2.5 MCG/ACT aerosol solution Inhale 2 puffs Daily.     No current facility-administered medications on file prior to visit.          The following portions of the patient's history were reviewed and updated as appropriate: allergies, current medications, past family history, past medical history, past social history, past surgical history and problem list.    Review of Systems   Constitution: Negative.   HENT: Negative.  Negative for congestion.    Eyes: Negative.    Cardiovascular: Negative.  Negative for chest pain, cyanosis, dyspnea on exertion, irregular heartbeat, leg swelling, near-syncope, orthopnea, palpitations, paroxysmal nocturnal dyspnea and syncope.   Respiratory: Negative.  Negative for shortness of breath.    Hematologic/Lymphatic: Negative.    Musculoskeletal: Negative.    Gastrointestinal: Negative.    Neurological: Negative.  Negative for headaches.          Objective:     /76 (BP Location: Left arm, Patient Position: Sitting)   Pulse 59   Ht 180.3 cm (71\")   Wt 105 kg (231 lb)   SpO2 92%   BMI 32.22 kg/m²   Physical Exam   Constitutional: He appears well-developed and well-nourished. No distress.   HENT:   Head: Normocephalic and atraumatic.   Mouth/Throat: Oropharynx is clear and moist. No oropharyngeal exudate.   Eyes: Pupils are equal, " round, and reactive to light. Conjunctivae and EOM are normal. No scleral icterus.   Neck: Normal range of motion. Neck supple. No JVD present. No tracheal deviation present. No thyromegaly present.   Cardiovascular: Normal rate, regular rhythm, normal heart sounds and intact distal pulses.  PMI is not displaced.  Exam reveals no gallop, no friction rub and no decreased pulses.    No murmur heard.  Pulses:       Carotid pulses are 3+ on the right side, and 3+ on the left side.       Radial pulses are 3+ on the right side, and 3+ on the left side.   Pulmonary/Chest: Effort normal and breath sounds normal. No respiratory distress. He has no wheezes. He has no rales. He exhibits no tenderness.   Abdominal: Soft. Bowel sounds are normal. He exhibits no distension, no abdominal bruit and no mass. There is no splenomegaly or hepatomegaly. There is no tenderness. There is no rebound and no guarding.   Musculoskeletal: Normal range of motion. He exhibits no edema, tenderness or deformity.   Lymphadenopathy:     He has no cervical adenopathy.   Neurological: He is alert. He has normal reflexes. No cranial nerve deficit. He exhibits normal muscle tone. Coordination normal.   Skin: Skin is warm and dry. No rash noted. He is not diaphoretic. No erythema.   Psychiatric: He has a normal mood and affect. His behavior is normal. Judgment and thought content normal.         Lab Review  Lab Results   Component Value Date     06/20/2018    K 4.6 06/20/2018     06/20/2018    BUN 19 06/20/2018    CREATININE 1.54 (H) 06/20/2018    GLUCOSE 108 06/20/2018    CALCIUM 9.6 06/20/2018    ALT 30 06/20/2018    ALKPHOS 72 06/20/2018    LABIL2 1.5 03/25/2016     Lab Results   Component Value Date    CKTOTAL 85 04/27/2018     Lab Results   Component Value Date    WBC 4.98 04/27/2018    HGB 15.1 04/27/2018    HCT 44.9 04/27/2018     04/27/2018     Lab Results   Component Value Date    INR 0.92 10/22/2017    INR <0.90 08/30/2016      Lab Results   Component Value Date    MG 2.0 10/23/2017     Lab Results   Component Value Date    PSA 1.450 07/11/2017    TSH 2.184 10/22/2017     No results found for: BNP  Lab Results   Component Value Date    CHLPL 144 03/25/2016    CHOL 128 04/27/2018    TRIG 140 04/27/2018    HDL 41 (L) 04/27/2018    VLDL 28 04/27/2018    LDLHDL 1.44 04/27/2018     Lab Results   Component Value Date    LDL 59 04/27/2018    LDL 88 10/23/2017         ECG 12 Lead  Date/Time: 7/30/2018 12:33 PM  Performed by: DUSTIN ANN  Authorized by: DUSTIN ANN   Comparison: compared with previous ECG from 4/30/2018  Similar to previous ECG  Rhythm: sinus rhythm and sinus bradycardia  Rate: bradycardic  BPM: 59  Conduction: conduction normal  ST Segments: ST segments normal  T Waves: T waves normal  QRS axis: normal  Other: no other findings  Clinical impression: normal ECG               I personally viewed and interpreted the patient's LAB data         Assessment:     1. Preoperative clearance    2. Paroxysmal atrial fibrillation (CMS/HCC)    3. Mixed hyperlipidemia    4. Essential hypertension    5. Encounter for monitoring flecainide therapy    6. Chronic anticoagulation, eliquis    7. Obesity (BMI 30.0-34.9)          Plan:      Patient is here for preop clearance for cholecystectomy.  He has multiple chronic medical problems but overall he is stable and has no specific complaints.  He has chronic renal failure with creatinine 1.5 which is stable.  Blood pressure is very well controlled.  Blood sugar is is controlled.  Lipids are also controlled.    He is on flecainide therapy for paroxysmal atrial fibrillation and is maintaining sinus rhythm.  Anticoagulation with eliquis.  EKG today does not show any acute changes.    Patient is cleared for surgery.  He was advised to hold eliquis for 2 days and he can have surgery on the third day.          No Follow-up on file.

## 2018-08-01 ENCOUNTER — PREP FOR SURGERY (OUTPATIENT)
Dept: OTHER | Facility: HOSPITAL | Age: 68
End: 2018-08-01

## 2018-08-01 DIAGNOSIS — K82.8 BILIARY DYSKINESIA: Primary | ICD-10-CM

## 2018-08-01 RX ORDER — SODIUM CHLORIDE 0.9 % (FLUSH) 0.9 %
1-10 SYRINGE (ML) INJECTION AS NEEDED
Status: CANCELLED | OUTPATIENT
Start: 2018-08-01 | End: 2019-08-01

## 2018-08-08 ENCOUNTER — APPOINTMENT (OUTPATIENT)
Dept: PREADMISSION TESTING | Facility: HOSPITAL | Age: 68
End: 2018-08-08

## 2018-08-08 VITALS — BODY MASS INDEX: 32.08 KG/M2 | WEIGHT: 230 LBS

## 2018-08-08 DIAGNOSIS — K82.8 BILIARY DYSKINESIA: ICD-10-CM

## 2018-08-08 LAB
ALBUMIN SERPL-MCNC: 4.6 G/DL (ref 3.4–4.8)
ALBUMIN/GLOB SERPL: 1.6 G/DL (ref 1.5–2.5)
ALP SERPL-CCNC: 201 U/L (ref 40–129)
ALT SERPL W P-5'-P-CCNC: 425 U/L (ref 10–44)
ANION GAP SERPL CALCULATED.3IONS-SCNC: 7.1 MMOL/L (ref 3.6–11.2)
AST SERPL-CCNC: 247 U/L (ref 10–34)
BASOPHILS # BLD AUTO: 0.04 10*3/MM3 (ref 0–0.3)
BASOPHILS NFR BLD AUTO: 0.8 % (ref 0–2)
BILIRUB SERPL-MCNC: 0.9 MG/DL (ref 0.2–1.8)
BUN BLD-MCNC: 21 MG/DL (ref 7–21)
BUN/CREAT SERPL: 13.8 (ref 7–25)
CALCIUM SPEC-SCNC: 9.6 MG/DL (ref 7.7–10)
CHLORIDE SERPL-SCNC: 108 MMOL/L (ref 99–112)
CO2 SERPL-SCNC: 24.9 MMOL/L (ref 24.3–31.9)
CREAT BLD-MCNC: 1.52 MG/DL (ref 0.43–1.29)
DEPRECATED RDW RBC AUTO: 44.4 FL (ref 37–54)
EOSINOPHIL # BLD AUTO: 0.23 10*3/MM3 (ref 0–0.7)
EOSINOPHIL NFR BLD AUTO: 4.5 % (ref 0–7)
ERYTHROCYTE [DISTWIDTH] IN BLOOD BY AUTOMATED COUNT: 13.7 % (ref 11.5–14.5)
GFR SERPL CREATININE-BSD FRML MDRD: 46 ML/MIN/1.73
GLOBULIN UR ELPH-MCNC: 2.9 GM/DL
GLUCOSE BLD-MCNC: 105 MG/DL (ref 70–110)
HCT VFR BLD AUTO: 43.4 % (ref 42–52)
HGB BLD-MCNC: 14.6 G/DL (ref 14–18)
IMM GRANULOCYTES # BLD: 0.08 10*3/MM3 (ref 0–0.03)
IMM GRANULOCYTES NFR BLD: 1.6 % (ref 0–0.5)
LYMPHOCYTES # BLD AUTO: 2.04 10*3/MM3 (ref 1–3)
LYMPHOCYTES NFR BLD AUTO: 40.3 % (ref 16–46)
MCH RBC QN AUTO: 30.2 PG (ref 27–33)
MCHC RBC AUTO-ENTMCNC: 33.6 G/DL (ref 33–37)
MCV RBC AUTO: 89.7 FL (ref 80–94)
MONOCYTES # BLD AUTO: 0.58 10*3/MM3 (ref 0.1–0.9)
MONOCYTES NFR BLD AUTO: 11.5 % (ref 0–12)
NEUTROPHILS # BLD AUTO: 2.09 10*3/MM3 (ref 1.4–6.5)
NEUTROPHILS NFR BLD AUTO: 41.3 % (ref 40–75)
OSMOLALITY SERPL CALC.SUM OF ELEC: 282.7 MOSM/KG (ref 273–305)
PLATELET # BLD AUTO: 195 10*3/MM3 (ref 130–400)
PMV BLD AUTO: 10.6 FL (ref 6–10)
POTASSIUM BLD-SCNC: 4.6 MMOL/L (ref 3.5–5.3)
PROT SERPL-MCNC: 7.5 G/DL (ref 6–8)
RBC # BLD AUTO: 4.84 10*6/MM3 (ref 4.7–6.1)
SODIUM BLD-SCNC: 140 MMOL/L (ref 135–153)
WBC NRBC COR # BLD: 5.06 10*3/MM3 (ref 4.5–12.5)

## 2018-08-08 PROCEDURE — 85025 COMPLETE CBC W/AUTO DIFF WBC: CPT | Performed by: SURGERY

## 2018-08-08 PROCEDURE — 80053 COMPREHEN METABOLIC PANEL: CPT | Performed by: SURGERY

## 2018-08-08 PROCEDURE — 36415 COLL VENOUS BLD VENIPUNCTURE: CPT

## 2018-08-08 RX ORDER — SUCRALFATE 1 G/1
1 TABLET ORAL 4 TIMES DAILY
COMMUNITY
End: 2018-08-22

## 2018-08-08 RX ORDER — LANSOPRAZOLE 30 MG/1
30 CAPSULE, DELAYED RELEASE ORAL DAILY
Status: ON HOLD | COMMUNITY
End: 2018-12-13 | Stop reason: SDUPTHER

## 2018-08-08 NOTE — DISCHARGE INSTRUCTIONS
TAKE the following medications the morning of surgery:  All heart or blood pressure medications    HOLD all diabetic medications the morning of surgery as ordered by physician.    Please discontinue all blood thinners and anticoagulants (except aspirin) prior to surgery as per your surgeon and cardiologist instructions.  Aspirin may be continued up to the day prior to surgery.     CHLORHEXIDINE CLOTHS GIVEN WITH INSTRUCTIONS AND FORM TO RETURN TO HOSPITAL    General Instructions:  · Do not eat or drink after midnight: includes water, mints, or gum. You may brush your teeth.  Dental appliances that are removable must be taken out day of surgery.  · Do not smoke, chew tobacco, or drink alcohol.  · Bring medications in original bottles, any inhalers and if applicable your C-PAP/BI-PAP machine.  · Bring any papers given to you in the doctor's office.  · Wear clean comfortable clothes and socks.  · Do not wear contact lenses or make-up. Bring a case for your glasses if applicable.  · Bring crutches or walker if applicable.  · Leave all other valuables and jewelry at home.    If you were given a blood bank ID arm band remember to bring it with you the day of surgery.    Preventing a Surgical Site Infection:  Shower the night before surgery (unless instructed other wise) using a fresh bar of anti-bacterial soap (such as Dial) and clean washcloth. Dry with a clean towel and dress in clean clothing.  For 2 to 3 days before surgery, avoid shaving with a razor near where you will have surgery because the razor can irritate skin and make it easier to develop an infection. Ask your surgeon if you will be receiving antibiotics prior to surgery.  Make sure you, your family, and all healthcare providers clear their hands with soap and water or an alcohol-based hand  before caring for you or your wound.  If at all possible, quit smoking as many days before surgery as you can.    Day of surgery:  Upon arrival, a Pre-op nurse  and Anesthesiologist will review your health history, obtain vital signs, and answer questions you may have. The only belongings needed at this time will be your home medications and if applicable your C-PAP/BI-PAP machine. If you are staying overnight your family can leave the rest of your belongings in the car and bring them to your room later. A Pre-op nurse will start an IV and you may receive medication in preparation for surgery, including something to help you relax. Your family will be able to see you in the Pre-op area. While you are in surgery your family should notify the waiting room  if they leave the waiting room area and provide a contact phone number.    Please be aware that surgery does come with discomfort. We want to make every effort to control your discomfort so please discuss any uncontrolled symptoms with your nurse. Your doctor will most likely have prescribed pain medications.    If you are going home after surgery you will receive individualized written care instructions before being discharged. A responsible adult must drive you to and from the hospital on the day of surgery and stay with you for 24 hours.    If you are staying overnight following surgery, you will be transported to your hospital room following the recovery period.  [facility] has all private rooms.    If you have any questions please call Pre-Admission Testing at [phone].  Deductibles and co-payments are collected on the day of service. Please be prepared to pay the required co-pay, deductible or deposit on the day of service as defined by your plan.

## 2018-08-10 ENCOUNTER — ANESTHESIA (OUTPATIENT)
Dept: PERIOP | Facility: HOSPITAL | Age: 68
End: 2018-08-10

## 2018-08-10 ENCOUNTER — ANESTHESIA EVENT (OUTPATIENT)
Dept: PERIOP | Facility: HOSPITAL | Age: 68
End: 2018-08-10

## 2018-08-10 ENCOUNTER — HOSPITAL ENCOUNTER (OUTPATIENT)
Facility: HOSPITAL | Age: 68
Setting detail: HOSPITAL OUTPATIENT SURGERY
Discharge: HOME | End: 2018-08-10
Attending: SURGERY | Admitting: SURGERY

## 2018-08-10 VITALS
SYSTOLIC BLOOD PRESSURE: 132 MMHG | TEMPERATURE: 98.4 F | HEART RATE: 74 BPM | RESPIRATION RATE: 16 BRPM | OXYGEN SATURATION: 96 % | WEIGHT: 230 LBS | DIASTOLIC BLOOD PRESSURE: 85 MMHG | BODY MASS INDEX: 32.08 KG/M2

## 2018-08-10 DIAGNOSIS — K82.8 BILIARY DYSKINESIA: ICD-10-CM

## 2018-08-10 LAB
GLUCOSE BLDC GLUCOMTR-MCNC: 121 MG/DL (ref 70–130)
GLUCOSE BLDC GLUCOMTR-MCNC: 174 MG/DL (ref 70–130)
HBV SURFACE AG SERPL QL IA: NORMAL
HCV AB SER DONR QL: NORMAL
HIV1+2 AB SER QL: NORMAL

## 2018-08-10 PROCEDURE — 25010000002 PROPOFOL 10 MG/ML EMULSION: Performed by: NURSE ANESTHETIST, CERTIFIED REGISTERED

## 2018-08-10 PROCEDURE — 25010000002 FENTANYL CITRATE (PF) 100 MCG/2ML SOLUTION: Performed by: NURSE ANESTHETIST, CERTIFIED REGISTERED

## 2018-08-10 PROCEDURE — 94799 UNLISTED PULMONARY SVC/PX: CPT

## 2018-08-10 PROCEDURE — 25010000002 HYDRALAZINE PER 20 MG: Performed by: NURSE ANESTHETIST, CERTIFIED REGISTERED

## 2018-08-10 PROCEDURE — 82962 GLUCOSE BLOOD TEST: CPT

## 2018-08-10 PROCEDURE — 0FT44ZZ RESECTION OF GALLBLADDER, PERCUTANEOUS ENDOSCOPIC APPROACH: ICD-10-PCS | Performed by: SURGERY

## 2018-08-10 PROCEDURE — 86803 HEPATITIS C AB TEST: CPT | Performed by: SURGERY

## 2018-08-10 PROCEDURE — 25010000002 PHENYLEPHRINE PER 1 ML: Performed by: NURSE ANESTHETIST, CERTIFIED REGISTERED

## 2018-08-10 PROCEDURE — 88304 TISSUE EXAM BY PATHOLOGIST: CPT | Performed by: SURGERY

## 2018-08-10 PROCEDURE — 25010000002 MIDAZOLAM PER 1 MG: Performed by: NURSE ANESTHETIST, CERTIFIED REGISTERED

## 2018-08-10 PROCEDURE — 25010000002 NEOSTIGMINE 10 MG/10ML SOLUTION: Performed by: NURSE ANESTHETIST, CERTIFIED REGISTERED

## 2018-08-10 PROCEDURE — 47562 LAPAROSCOPIC CHOLECYSTECTOMY: CPT | Performed by: SURGERY

## 2018-08-10 PROCEDURE — 87340 HEPATITIS B SURFACE AG IA: CPT | Performed by: SURGERY

## 2018-08-10 PROCEDURE — G0432 EIA HIV-1/HIV-2 SCREEN: HCPCS | Performed by: SURGERY

## 2018-08-10 PROCEDURE — 25010000002 ONDANSETRON PER 1 MG: Performed by: NURSE ANESTHETIST, CERTIFIED REGISTERED

## 2018-08-10 RX ORDER — MIDAZOLAM HYDROCHLORIDE 1 MG/ML
INJECTION INTRAMUSCULAR; INTRAVENOUS AS NEEDED
Status: DISCONTINUED | OUTPATIENT
Start: 2018-08-10 | End: 2018-08-10 | Stop reason: SURG

## 2018-08-10 RX ORDER — LABETALOL HYDROCHLORIDE 5 MG/ML
5 INJECTION, SOLUTION INTRAVENOUS ONCE
Status: COMPLETED | OUTPATIENT
Start: 2018-08-10 | End: 2018-08-10

## 2018-08-10 RX ORDER — FENTANYL CITRATE 50 UG/ML
INJECTION, SOLUTION INTRAMUSCULAR; INTRAVENOUS AS NEEDED
Status: DISCONTINUED | OUTPATIENT
Start: 2018-08-10 | End: 2018-08-10 | Stop reason: SURG

## 2018-08-10 RX ORDER — PROPOFOL 10 MG/ML
VIAL (ML) INTRAVENOUS AS NEEDED
Status: DISCONTINUED | OUTPATIENT
Start: 2018-08-10 | End: 2018-08-10 | Stop reason: SURG

## 2018-08-10 RX ORDER — SODIUM CHLORIDE, SODIUM LACTATE, POTASSIUM CHLORIDE, CALCIUM CHLORIDE 600; 310; 30; 20 MG/100ML; MG/100ML; MG/100ML; MG/100ML
125 INJECTION, SOLUTION INTRAVENOUS CONTINUOUS
Status: DISCONTINUED | OUTPATIENT
Start: 2018-08-10 | End: 2018-08-10 | Stop reason: HOSPADM

## 2018-08-10 RX ORDER — OXYCODONE HYDROCHLORIDE AND ACETAMINOPHEN 5; 325 MG/1; MG/1
1 TABLET ORAL ONCE AS NEEDED
Status: DISCONTINUED | OUTPATIENT
Start: 2018-08-10 | End: 2018-08-10 | Stop reason: HOSPADM

## 2018-08-10 RX ORDER — SODIUM CHLORIDE 0.9 % (FLUSH) 0.9 %
1-10 SYRINGE (ML) INJECTION AS NEEDED
Status: DISCONTINUED | OUTPATIENT
Start: 2018-08-10 | End: 2018-08-10 | Stop reason: HOSPADM

## 2018-08-10 RX ORDER — HYDRALAZINE HYDROCHLORIDE 20 MG/ML
INJECTION INTRAMUSCULAR; INTRAVENOUS AS NEEDED
Status: DISCONTINUED | OUTPATIENT
Start: 2018-08-10 | End: 2018-08-10 | Stop reason: SURG

## 2018-08-10 RX ORDER — LIDOCAINE HYDROCHLORIDE 20 MG/ML
INJECTION, SOLUTION EPIDURAL; INFILTRATION; INTRACAUDAL; PERINEURAL AS NEEDED
Status: DISCONTINUED | OUTPATIENT
Start: 2018-08-10 | End: 2018-08-10 | Stop reason: SURG

## 2018-08-10 RX ORDER — FAMOTIDINE 10 MG/ML
INJECTION, SOLUTION INTRAVENOUS AS NEEDED
Status: DISCONTINUED | OUTPATIENT
Start: 2018-08-10 | End: 2018-08-10 | Stop reason: SURG

## 2018-08-10 RX ORDER — METOPROLOL TARTRATE 5 MG/5ML
INJECTION INTRAVENOUS AS NEEDED
Status: DISCONTINUED | OUTPATIENT
Start: 2018-08-10 | End: 2018-08-10 | Stop reason: SURG

## 2018-08-10 RX ORDER — IPRATROPIUM BROMIDE AND ALBUTEROL SULFATE 2.5; .5 MG/3ML; MG/3ML
3 SOLUTION RESPIRATORY (INHALATION) ONCE AS NEEDED
Status: DISCONTINUED | OUTPATIENT
Start: 2018-08-10 | End: 2018-08-10 | Stop reason: HOSPADM

## 2018-08-10 RX ORDER — MAGNESIUM HYDROXIDE 1200 MG/15ML
LIQUID ORAL AS NEEDED
Status: DISCONTINUED | OUTPATIENT
Start: 2018-08-10 | End: 2018-08-10 | Stop reason: HOSPADM

## 2018-08-10 RX ORDER — ONDANSETRON 2 MG/ML
INJECTION INTRAMUSCULAR; INTRAVENOUS AS NEEDED
Status: DISCONTINUED | OUTPATIENT
Start: 2018-08-10 | End: 2018-08-10 | Stop reason: SURG

## 2018-08-10 RX ORDER — FENTANYL CITRATE 50 UG/ML
50 INJECTION, SOLUTION INTRAMUSCULAR; INTRAVENOUS
Status: COMPLETED | OUTPATIENT
Start: 2018-08-10 | End: 2018-08-10

## 2018-08-10 RX ORDER — ONDANSETRON 2 MG/ML
4 INJECTION INTRAMUSCULAR; INTRAVENOUS ONCE AS NEEDED
Status: DISCONTINUED | OUTPATIENT
Start: 2018-08-10 | End: 2018-08-10 | Stop reason: HOSPADM

## 2018-08-10 RX ORDER — SODIUM CHLORIDE 9 MG/ML
INJECTION, SOLUTION INTRAVENOUS AS NEEDED
Status: DISCONTINUED | OUTPATIENT
Start: 2018-08-10 | End: 2018-08-10 | Stop reason: HOSPADM

## 2018-08-10 RX ORDER — ROCURONIUM BROMIDE 10 MG/ML
INJECTION, SOLUTION INTRAVENOUS AS NEEDED
Status: DISCONTINUED | OUTPATIENT
Start: 2018-08-10 | End: 2018-08-10 | Stop reason: SURG

## 2018-08-10 RX ORDER — BUPIVACAINE HYDROCHLORIDE AND EPINEPHRINE 5; 5 MG/ML; UG/ML
INJECTION, SOLUTION EPIDURAL; INTRACAUDAL; PERINEURAL AS NEEDED
Status: DISCONTINUED | OUTPATIENT
Start: 2018-08-10 | End: 2018-08-10 | Stop reason: HOSPADM

## 2018-08-10 RX ORDER — MEPERIDINE HYDROCHLORIDE 50 MG/ML
12.5 INJECTION INTRAMUSCULAR; INTRAVENOUS; SUBCUTANEOUS
Status: DISCONTINUED | OUTPATIENT
Start: 2018-08-10 | End: 2018-08-10 | Stop reason: HOSPADM

## 2018-08-10 RX ORDER — NEOSTIGMINE METHYLSULFATE 1 MG/ML
INJECTION, SOLUTION INTRAVENOUS AS NEEDED
Status: DISCONTINUED | OUTPATIENT
Start: 2018-08-10 | End: 2018-08-10 | Stop reason: SURG

## 2018-08-10 RX ORDER — GLYCOPYRROLATE 0.2 MG/ML
INJECTION INTRAMUSCULAR; INTRAVENOUS AS NEEDED
Status: DISCONTINUED | OUTPATIENT
Start: 2018-08-10 | End: 2018-08-10 | Stop reason: SURG

## 2018-08-10 RX ORDER — HYDROCODONE BITARTRATE AND ACETAMINOPHEN 5; 325 MG/1; MG/1
1-2 TABLET ORAL EVERY 4 HOURS PRN
Qty: 15 TABLET | Refills: 0 | Status: SHIPPED | OUTPATIENT
Start: 2018-08-10 | End: 2018-08-16

## 2018-08-10 RX ADMIN — PHENYLEPHRINE HYDROCHLORIDE 100 MCG: 10 INJECTION, SOLUTION INTRAMUSCULAR; INTRAVENOUS; SUBCUTANEOUS at 12:26

## 2018-08-10 RX ADMIN — METOPROLOL TARTRATE 3 MG: 1 INJECTION, SOLUTION INTRAVENOUS at 12:34

## 2018-08-10 RX ADMIN — SODIUM CHLORIDE, POTASSIUM CHLORIDE, SODIUM LACTATE AND CALCIUM CHLORIDE 125 ML/HR: 600; 310; 30; 20 INJECTION, SOLUTION INTRAVENOUS at 09:41

## 2018-08-10 RX ADMIN — HYDRALAZINE HYDROCHLORIDE 5 MG: 20 INJECTION INTRAMUSCULAR; INTRAVENOUS at 12:55

## 2018-08-10 RX ADMIN — GLYCOPYRROLATE 0.4 MG: 0.2 INJECTION INTRAMUSCULAR; INTRAVENOUS at 12:55

## 2018-08-10 RX ADMIN — ROCURONIUM BROMIDE 30 MG: 10 INJECTION INTRAVENOUS at 12:18

## 2018-08-10 RX ADMIN — PHENYLEPHRINE HYDROCHLORIDE 100 MCG: 10 INJECTION, SOLUTION INTRAMUSCULAR; INTRAVENOUS; SUBCUTANEOUS at 12:34

## 2018-08-10 RX ADMIN — AMPICILLIN SODIUM AND SULBACTAM SODIUM 3 G: 2; 1 INJECTION, POWDER, FOR SOLUTION INTRAMUSCULAR; INTRAVENOUS at 12:12

## 2018-08-10 RX ADMIN — METOPROLOL TARTRATE 2 MG: 1 INJECTION, SOLUTION INTRAVENOUS at 12:38

## 2018-08-10 RX ADMIN — ONDANSETRON 4 MG: 2 INJECTION, SOLUTION INTRAMUSCULAR; INTRAVENOUS at 12:18

## 2018-08-10 RX ADMIN — LABETALOL HYDROCHLORIDE 5 MG: 5 INJECTION, SOLUTION INTRAVENOUS at 13:24

## 2018-08-10 RX ADMIN — LIDOCAINE HYDROCHLORIDE 40 MG: 20 INJECTION, SOLUTION EPIDURAL; INFILTRATION; INTRACAUDAL; PERINEURAL at 12:18

## 2018-08-10 RX ADMIN — FENTANYL CITRATE 50 MCG: 50 INJECTION INTRAMUSCULAR; INTRAVENOUS at 13:50

## 2018-08-10 RX ADMIN — FENTANYL CITRATE 100 MCG: 50 INJECTION INTRAMUSCULAR; INTRAVENOUS at 12:12

## 2018-08-10 RX ADMIN — SODIUM CHLORIDE, POTASSIUM CHLORIDE, SODIUM LACTATE AND CALCIUM CHLORIDE 125 ML/HR: 600; 310; 30; 20 INJECTION, SOLUTION INTRAVENOUS at 14:35

## 2018-08-10 RX ADMIN — NEOSTIGMINE METHYLSULFATE 3 MG: 1 INJECTION, SOLUTION INTRAVENOUS at 12:55

## 2018-08-10 RX ADMIN — PROPOFOL 150 MG: 10 INJECTION, EMULSION INTRAVENOUS at 12:18

## 2018-08-10 RX ADMIN — HYDRALAZINE HYDROCHLORIDE 5 MG: 20 INJECTION INTRAMUSCULAR; INTRAVENOUS at 12:42

## 2018-08-10 RX ADMIN — FENTANYL CITRATE 50 MCG: 50 INJECTION INTRAMUSCULAR; INTRAVENOUS at 13:45

## 2018-08-10 RX ADMIN — FAMOTIDINE 20 MG: 10 INJECTION, SOLUTION INTRAVENOUS at 12:18

## 2018-08-10 RX ADMIN — MIDAZOLAM HYDROCHLORIDE 2 MG: 1 INJECTION, SOLUTION INTRAMUSCULAR; INTRAVENOUS at 12:12

## 2018-08-10 NOTE — ANESTHESIA PROCEDURE NOTES
Airway  Urgency: elective    Date/Time: 8/10/2018 12:19 PM  Airway not difficult    General Information and Staff    Patient location during procedure: OR  Anesthesiologist: CODY BELLA  CRNA: ROHAN HOWELL    Indications and Patient Condition  Indications for airway management: airway protection    Preoxygenated: yes  MILS maintained throughout  Mask difficulty assessment: 0 - not attempted    Final Airway Details  Final airway type: endotracheal airway      Successful airway: ETT  Cuffed: yes   Successful intubation technique: video laryngoscopy and direct laryngoscopy  Facilitating devices/methods: intubating stylet  Endotracheal tube insertion site: oral  Blade: Juancho  Blade size: #3  ETT size: 7.5 mm  Cormack-Lehane Classification: grade I - full view of glottis  Placement verified by: chest auscultation, capnometry and palpation of cuff   Cuff volume (mL): 10  Measured from: lips  ETT to lips (cm): 21  Number of attempts at approach: 2    Additional Comments  Dentition and lips same preop  DL done grade three view with very minimal view of epigottis, one attempt at intubation unsuccessful, glide scope used grade 1 view intubation successful with one attempt

## 2018-08-10 NOTE — ANESTHESIA POSTPROCEDURE EVALUATION
Patient: Todd Phillips    Procedure Summary     Date:  08/10/18 Room / Location:  [facility] OR 02 /  [facility] OR    Anesthesia Start:  1212 Anesthesia Stop:  1306    Procedure:  CHOLECYSTECTOMY LAPAROSCOPIC (N/A Abdomen) Diagnosis:       Biliary dyskinesia      (Biliary dyskinesia [K82.8])    Surgeon:  Ronak Downs MD Provider:  Franki Scott MD    Anesthesia Type:  general ASA Status:  3          Anesthesia Type: general  Last vitals  BP   132/85 (08/10/18 1501)   Temp   98.4 °F (36.9 °C) (08/10/18 1501)   Pulse   74 (08/10/18 1501)   Resp   16 (08/10/18 1501)     SpO2   96 % (08/10/18 1501)     Post Anesthesia Care and Evaluation    Patient location during evaluation: PHASE II  Patient participation: complete - patient participated  Level of consciousness: awake and alert  Pain score: 1  Pain management: adequate  Airway patency: patent  Anesthetic complications: No anesthetic complications  PONV Status: controlled  Cardiovascular status: acceptable  Respiratory status: acceptable  Hydration status: acceptable

## 2018-08-10 NOTE — ANESTHESIA PREPROCEDURE EVALUATION
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, surface ), asthma (child), sleep apnea,   (-) not a smoker  Cardiovascular - normal exam  Exercise tolerance: good (4-7 METS)    NYHA Classification: II  PT is on anticoagulation therapy  Patient on routine beta blocker and Beta blocker given within 24 hours of surgery    (+) hypertension, CAD, dysrhythmias, hyperlipidemia,   (-) past MI, angina, CHF      Neuro/Psych  (+) psychiatric history Depression and Anxiety,     (-) seizures, CVA  GI/Hepatic/Renal/Endo    (+) obesity,  GERD,  renal disease CRI and stones, diabetes mellitus,   (-) morbid obesity, liver disease, hypothyroidism    Musculoskeletal     (+) back pain,   Abdominal  - normal exam    Bowel sounds: normal.   Substance History - negative use     OB/GYN negative ob/gyn ROS         Other   (+) arthritis                     Anesthesia Plan    ASA 3     general     intravenous induction   Anesthetic plan and risks discussed with patient.  Use of blood products discussed with patient  Consented to blood products.

## 2018-08-13 ENCOUNTER — APPOINTMENT (OUTPATIENT)
Dept: CT IMAGING | Facility: HOSPITAL | Age: 68
End: 2018-08-13

## 2018-08-13 ENCOUNTER — HOSPITAL ENCOUNTER (INPATIENT)
Facility: HOSPITAL | Age: 68
LOS: 3 days | Discharge: HOME | End: 2018-08-16
Attending: FAMILY MEDICINE | Admitting: SURGERY

## 2018-08-13 ENCOUNTER — APPOINTMENT (OUTPATIENT)
Dept: ULTRASOUND IMAGING | Facility: HOSPITAL | Age: 68
End: 2018-08-13

## 2018-08-13 DIAGNOSIS — G89.18 POSTOPERATIVE ABDOMINAL PAIN: ICD-10-CM

## 2018-08-13 DIAGNOSIS — R10.9 POSTOPERATIVE ABDOMINAL PAIN: ICD-10-CM

## 2018-08-13 DIAGNOSIS — E80.6 HYPERBILIRUBINEMIA: Primary | ICD-10-CM

## 2018-08-13 PROBLEM — K81.9 CHOLECYSTITIS: Status: ACTIVE | Noted: 2018-08-13

## 2018-08-13 LAB
ALBUMIN SERPL-MCNC: 4.4 G/DL (ref 3.4–4.8)
ALBUMIN/GLOB SERPL: 1.7 G/DL (ref 1.5–2.5)
ALP SERPL-CCNC: 300 U/L (ref 40–129)
ALT SERPL W P-5'-P-CCNC: 839 U/L (ref 10–44)
AMYLASE SERPL-CCNC: 70 U/L (ref 28–100)
ANION GAP SERPL CALCULATED.3IONS-SCNC: 10.7 MMOL/L (ref 3.6–11.2)
APTT PPP: 26.2 SECONDS (ref 23.8–36.1)
AST SERPL-CCNC: 555 U/L (ref 10–34)
BACTERIA UR QL AUTO: NORMAL /HPF
BASOPHILS # BLD AUTO: 0.02 10*3/MM3 (ref 0–0.3)
BASOPHILS NFR BLD AUTO: 0.4 % (ref 0–2)
BILIRUB SERPL-MCNC: 5.5 MG/DL (ref 0.2–1.8)
BILIRUB UR QL STRIP: ABNORMAL
BUN BLD-MCNC: 20 MG/DL (ref 7–21)
BUN/CREAT SERPL: 13.2 (ref 7–25)
CALCIUM SPEC-SCNC: 9.3 MG/DL (ref 7.7–10)
CHLORIDE SERPL-SCNC: 103 MMOL/L (ref 99–112)
CLARITY UR: CLEAR
CO2 SERPL-SCNC: 24.3 MMOL/L (ref 24.3–31.9)
COLOR UR: ABNORMAL
CREAT BLD-MCNC: 1.51 MG/DL (ref 0.43–1.29)
CRP SERPL-MCNC: <0.5 MG/DL (ref 0–0.99)
D-LACTATE SERPL-SCNC: 0.9 MMOL/L (ref 0.5–2)
DEPRECATED RDW RBC AUTO: 44.6 FL (ref 37–54)
EOSINOPHIL # BLD AUTO: 0.1 10*3/MM3 (ref 0–0.7)
EOSINOPHIL NFR BLD AUTO: 1.9 % (ref 0–7)
ERYTHROCYTE [DISTWIDTH] IN BLOOD BY AUTOMATED COUNT: 13.9 % (ref 11.5–14.5)
GFR SERPL CREATININE-BSD FRML MDRD: 46 ML/MIN/1.73
GLOBULIN UR ELPH-MCNC: 2.6 GM/DL
GLUCOSE BLD-MCNC: 170 MG/DL (ref 70–110)
GLUCOSE BLDC GLUCOMTR-MCNC: 101 MG/DL (ref 70–130)
GLUCOSE BLDC GLUCOMTR-MCNC: 107 MG/DL (ref 70–130)
GLUCOSE BLDC GLUCOMTR-MCNC: 134 MG/DL (ref 70–130)
GLUCOSE UR STRIP-MCNC: NEGATIVE MG/DL
HCT VFR BLD AUTO: 40.4 % (ref 42–52)
HGB BLD-MCNC: 13.9 G/DL (ref 14–18)
HGB UR QL STRIP.AUTO: NEGATIVE
HYALINE CASTS UR QL AUTO: NORMAL /LPF
IMM GRANULOCYTES # BLD: 0.08 10*3/MM3 (ref 0–0.03)
IMM GRANULOCYTES NFR BLD: 1.5 % (ref 0–0.5)
INR PPP: 1 (ref 0.9–1.1)
KETONES UR QL STRIP: NEGATIVE
LAB AP CASE REPORT: NORMAL
LEUKOCYTE ESTERASE UR QL STRIP.AUTO: ABNORMAL
LIPASE SERPL-CCNC: 34 U/L (ref 13–60)
LYMPHOCYTES # BLD AUTO: 1.34 10*3/MM3 (ref 1–3)
LYMPHOCYTES NFR BLD AUTO: 25.3 % (ref 16–46)
MCH RBC QN AUTO: 30.3 PG (ref 27–33)
MCHC RBC AUTO-ENTMCNC: 34.4 G/DL (ref 33–37)
MCV RBC AUTO: 88 FL (ref 80–94)
MONOCYTES # BLD AUTO: 0.56 10*3/MM3 (ref 0.1–0.9)
MONOCYTES NFR BLD AUTO: 10.6 % (ref 0–12)
NEUTROPHILS # BLD AUTO: 3.19 10*3/MM3 (ref 1.4–6.5)
NEUTROPHILS NFR BLD AUTO: 60.3 % (ref 40–75)
NITRITE UR QL STRIP: POSITIVE
OSMOLALITY SERPL CALC.SUM OF ELEC: 282.3 MOSM/KG (ref 273–305)
PATH REPORT.FINAL DX SPEC: NORMAL
PH UR STRIP.AUTO: <=5 [PH] (ref 5–8)
PLATELET # BLD AUTO: 183 10*3/MM3 (ref 130–400)
PMV BLD AUTO: 10.7 FL (ref 6–10)
POTASSIUM BLD-SCNC: 4 MMOL/L (ref 3.5–5.3)
PROT SERPL-MCNC: 7 G/DL (ref 6–8)
PROT UR QL STRIP: NEGATIVE
PROTHROMBIN TIME: 13.4 SECONDS (ref 11–15.4)
RBC # BLD AUTO: 4.59 10*6/MM3 (ref 4.7–6.1)
RBC # UR: NORMAL /HPF
REF LAB TEST METHOD: NORMAL
SODIUM BLD-SCNC: 138 MMOL/L (ref 135–153)
SP GR UR STRIP: 1.02 (ref 1–1.03)
SQUAMOUS #/AREA URNS HPF: NORMAL /HPF
UROBILINOGEN UR QL STRIP: ABNORMAL
WBC NRBC COR # BLD: 5.29 10*3/MM3 (ref 4.5–12.5)
WBC UR QL AUTO: NORMAL /HPF

## 2018-08-13 PROCEDURE — 99285 EMERGENCY DEPT VISIT HI MDM: CPT

## 2018-08-13 PROCEDURE — G0378 HOSPITAL OBSERVATION PER HR: HCPCS

## 2018-08-13 PROCEDURE — 36415 COLL VENOUS BLD VENIPUNCTURE: CPT

## 2018-08-13 PROCEDURE — 76705 ECHO EXAM OF ABDOMEN: CPT | Performed by: RADIOLOGY

## 2018-08-13 PROCEDURE — 25010000002 ONDANSETRON PER 1 MG: Performed by: FAMILY MEDICINE

## 2018-08-13 PROCEDURE — 74176 CT ABD & PELVIS W/O CONTRAST: CPT

## 2018-08-13 PROCEDURE — 85610 PROTHROMBIN TIME: CPT | Performed by: FAMILY MEDICINE

## 2018-08-13 PROCEDURE — 76705 ECHO EXAM OF ABDOMEN: CPT

## 2018-08-13 PROCEDURE — 86140 C-REACTIVE PROTEIN: CPT | Performed by: FAMILY MEDICINE

## 2018-08-13 PROCEDURE — 74176 CT ABD & PELVIS W/O CONTRAST: CPT | Performed by: RADIOLOGY

## 2018-08-13 PROCEDURE — 80053 COMPREHEN METABOLIC PANEL: CPT | Performed by: FAMILY MEDICINE

## 2018-08-13 PROCEDURE — 94799 UNLISTED PULMONARY SVC/PX: CPT

## 2018-08-13 PROCEDURE — 25010000002 HYDROMORPHONE PER 4 MG: Performed by: FAMILY MEDICINE

## 2018-08-13 PROCEDURE — 82962 GLUCOSE BLOOD TEST: CPT

## 2018-08-13 PROCEDURE — 83605 ASSAY OF LACTIC ACID: CPT | Performed by: FAMILY MEDICINE

## 2018-08-13 PROCEDURE — 81001 URINALYSIS AUTO W/SCOPE: CPT | Performed by: FAMILY MEDICINE

## 2018-08-13 PROCEDURE — 25010000002 ENOXAPARIN PER 10 MG: Performed by: SURGERY

## 2018-08-13 PROCEDURE — 85025 COMPLETE CBC W/AUTO DIFF WBC: CPT | Performed by: FAMILY MEDICINE

## 2018-08-13 PROCEDURE — 99223 1ST HOSP IP/OBS HIGH 75: CPT | Performed by: SURGERY

## 2018-08-13 PROCEDURE — 63710000001 DIPHENHYDRAMINE PER 50 MG: Performed by: SURGERY

## 2018-08-13 PROCEDURE — 87040 BLOOD CULTURE FOR BACTERIA: CPT | Performed by: FAMILY MEDICINE

## 2018-08-13 PROCEDURE — 25010000002 ONDANSETRON PER 1 MG: Performed by: SURGERY

## 2018-08-13 PROCEDURE — 82150 ASSAY OF AMYLASE: CPT | Performed by: FAMILY MEDICINE

## 2018-08-13 PROCEDURE — 85730 THROMBOPLASTIN TIME PARTIAL: CPT | Performed by: FAMILY MEDICINE

## 2018-08-13 PROCEDURE — 83690 ASSAY OF LIPASE: CPT | Performed by: FAMILY MEDICINE

## 2018-08-13 RX ORDER — MONTELUKAST SODIUM 10 MG/1
10 TABLET ORAL NIGHTLY
Status: DISCONTINUED | OUTPATIENT
Start: 2018-08-13 | End: 2018-08-16 | Stop reason: HOSPADM

## 2018-08-13 RX ORDER — ONDANSETRON 4 MG/1
4 TABLET, ORALLY DISINTEGRATING ORAL EVERY 6 HOURS PRN
Status: DISCONTINUED | OUTPATIENT
Start: 2018-08-13 | End: 2018-08-14

## 2018-08-13 RX ORDER — ALBUTEROL SULFATE 2.5 MG/3ML
2.5 SOLUTION RESPIRATORY (INHALATION) EVERY 6 HOURS PRN
Status: DISCONTINUED | OUTPATIENT
Start: 2018-08-13 | End: 2018-08-16 | Stop reason: HOSPADM

## 2018-08-13 RX ORDER — HYDROCODONE BITARTRATE AND ACETAMINOPHEN 5; 325 MG/1; MG/1
1-2 TABLET ORAL EVERY 4 HOURS PRN
Status: CANCELLED | OUTPATIENT
Start: 2018-08-13

## 2018-08-13 RX ORDER — ARFORMOTEROL TARTRATE 15 UG/2ML
15 SOLUTION RESPIRATORY (INHALATION)
Status: DISCONTINUED | OUTPATIENT
Start: 2018-08-13 | End: 2018-08-16 | Stop reason: HOSPADM

## 2018-08-13 RX ORDER — PANTOPRAZOLE SODIUM 40 MG/1
40 TABLET, DELAYED RELEASE ORAL EVERY MORNING
Status: DISCONTINUED | OUTPATIENT
Start: 2018-08-13 | End: 2018-08-16 | Stop reason: HOSPADM

## 2018-08-13 RX ORDER — FLUOXETINE HYDROCHLORIDE 20 MG/1
20 CAPSULE ORAL DAILY
Status: CANCELLED | OUTPATIENT
Start: 2018-08-13

## 2018-08-13 RX ORDER — MORPHINE SULFATE 2 MG/ML
2 INJECTION, SOLUTION INTRAMUSCULAR; INTRAVENOUS
Status: DISCONTINUED | OUTPATIENT
Start: 2018-08-13 | End: 2018-08-13

## 2018-08-13 RX ORDER — LOSARTAN POTASSIUM 25 MG/1
25 TABLET ORAL DAILY
Status: DISCONTINUED | OUTPATIENT
Start: 2018-08-13 | End: 2018-08-16 | Stop reason: HOSPADM

## 2018-08-13 RX ORDER — ASPIRIN 81 MG/1
81 TABLET ORAL DAILY
Status: DISCONTINUED | OUTPATIENT
Start: 2018-08-13 | End: 2018-08-16 | Stop reason: HOSPADM

## 2018-08-13 RX ORDER — FLUOXETINE HYDROCHLORIDE 20 MG/1
40 CAPSULE ORAL EVERY MORNING
Status: CANCELLED | OUTPATIENT
Start: 2018-08-13

## 2018-08-13 RX ORDER — OMEGA-3S/DHA/EPA/FISH OIL/D3 300MG-1000
1000 CAPSULE ORAL DAILY
Status: CANCELLED | OUTPATIENT
Start: 2018-08-13

## 2018-08-13 RX ORDER — SODIUM CHLORIDE 0.9 % (FLUSH) 0.9 %
10 SYRINGE (ML) INJECTION AS NEEDED
Status: DISCONTINUED | OUTPATIENT
Start: 2018-08-13 | End: 2018-08-16 | Stop reason: HOSPADM

## 2018-08-13 RX ORDER — FLUOXETINE HYDROCHLORIDE 20 MG/1
40 CAPSULE ORAL EVERY MORNING
COMMUNITY
End: 2018-10-11 | Stop reason: SDUPTHER

## 2018-08-13 RX ORDER — NALOXONE HCL 0.4 MG/ML
0.4 VIAL (ML) INJECTION
Status: DISCONTINUED | OUTPATIENT
Start: 2018-08-13 | End: 2018-08-13

## 2018-08-13 RX ORDER — ONDANSETRON 2 MG/ML
4 INJECTION INTRAMUSCULAR; INTRAVENOUS EVERY 6 HOURS PRN
Status: DISCONTINUED | OUTPATIENT
Start: 2018-08-13 | End: 2018-08-14

## 2018-08-13 RX ORDER — PIOGLITAZONEHYDROCHLORIDE 30 MG/1
30 TABLET ORAL DAILY
COMMUNITY
End: 2018-12-13 | Stop reason: HOSPADM

## 2018-08-13 RX ORDER — SUCRALFATE 1 G/1
1 TABLET ORAL 4 TIMES DAILY
Status: DISCONTINUED | OUTPATIENT
Start: 2018-08-13 | End: 2018-08-16 | Stop reason: HOSPADM

## 2018-08-13 RX ORDER — ONDANSETRON 2 MG/ML
4 INJECTION INTRAMUSCULAR; INTRAVENOUS ONCE
Status: COMPLETED | OUTPATIENT
Start: 2018-08-13 | End: 2018-08-13

## 2018-08-13 RX ORDER — FLUOXETINE HYDROCHLORIDE 20 MG/1
20 CAPSULE ORAL NIGHTLY
COMMUNITY
End: 2018-09-07

## 2018-08-13 RX ORDER — HYDROCODONE BITARTRATE AND ACETAMINOPHEN 5; 325 MG/1; MG/1
1 TABLET ORAL EVERY 4 HOURS PRN
Status: DISCONTINUED | OUTPATIENT
Start: 2018-08-13 | End: 2018-08-16 | Stop reason: HOSPADM

## 2018-08-13 RX ORDER — NITROGLYCERIN 0.4 MG/1
0.4 TABLET SUBLINGUAL AS NEEDED
Status: DISCONTINUED | OUTPATIENT
Start: 2018-08-13 | End: 2018-08-16 | Stop reason: HOSPADM

## 2018-08-13 RX ORDER — BUDESONIDE 0.5 MG/2ML
0.5 INHALANT ORAL
Status: DISCONTINUED | OUTPATIENT
Start: 2018-08-13 | End: 2018-08-16 | Stop reason: HOSPADM

## 2018-08-13 RX ORDER — DEXTROSE MONOHYDRATE 25 G/50ML
25 INJECTION, SOLUTION INTRAVENOUS
Status: DISCONTINUED | OUTPATIENT
Start: 2018-08-13 | End: 2018-08-16 | Stop reason: HOSPADM

## 2018-08-13 RX ORDER — POLYETHYLENE GLYCOL 3350 17 G/17G
17 POWDER, FOR SOLUTION ORAL DAILY PRN
Status: DISCONTINUED | OUTPATIENT
Start: 2018-08-13 | End: 2018-08-16 | Stop reason: HOSPADM

## 2018-08-13 RX ORDER — NICOTINE POLACRILEX 4 MG
15 LOZENGE BUCCAL
Status: DISCONTINUED | OUTPATIENT
Start: 2018-08-13 | End: 2018-08-16 | Stop reason: HOSPADM

## 2018-08-13 RX ORDER — FLECAINIDE ACETATE 50 MG/1
50 TABLET ORAL EVERY 12 HOURS SCHEDULED
Status: DISCONTINUED | OUTPATIENT
Start: 2018-08-13 | End: 2018-08-16 | Stop reason: HOSPADM

## 2018-08-13 RX ORDER — ONDANSETRON 4 MG/1
4 TABLET, FILM COATED ORAL EVERY 6 HOURS PRN
Status: DISCONTINUED | OUTPATIENT
Start: 2018-08-13 | End: 2018-08-14

## 2018-08-13 RX ORDER — PIOGLITAZONEHYDROCHLORIDE 15 MG/1
30 TABLET ORAL DAILY
Status: CANCELLED | OUTPATIENT
Start: 2018-08-13

## 2018-08-13 RX ORDER — COLCHICINE 0.6 MG/1
0.6 TABLET ORAL DAILY
Status: CANCELLED | OUTPATIENT
Start: 2018-08-13

## 2018-08-13 RX ORDER — DIPHENHYDRAMINE HCL 25 MG
25 CAPSULE ORAL EVERY 8 HOURS PRN
Status: DISCONTINUED | OUTPATIENT
Start: 2018-08-13 | End: 2018-08-16 | Stop reason: HOSPADM

## 2018-08-13 RX ORDER — ATORVASTATIN CALCIUM 10 MG/1
5 TABLET, FILM COATED ORAL DAILY
Status: CANCELLED | OUTPATIENT
Start: 2018-08-13

## 2018-08-13 RX ADMIN — BUDESONIDE 0.5 MG: 0.5 SUSPENSION RESPIRATORY (INHALATION) at 19:08

## 2018-08-13 RX ADMIN — FLECAINIDE ACETATE 50 MG: 50 TABLET ORAL at 14:10

## 2018-08-13 RX ADMIN — ENOXAPARIN SODIUM 100 MG: 100 INJECTION SUBCUTANEOUS at 21:42

## 2018-08-13 RX ADMIN — ONDANSETRON 4 MG: 2 INJECTION, SOLUTION INTRAMUSCULAR; INTRAVENOUS at 11:08

## 2018-08-13 RX ADMIN — SUCRALFATE 1 G: 1 TABLET ORAL at 21:42

## 2018-08-13 RX ADMIN — DIPHENHYDRAMINE HYDROCHLORIDE 25 MG: 25 CAPSULE ORAL at 14:09

## 2018-08-13 RX ADMIN — PANTOPRAZOLE SODIUM 40 MG: 40 TABLET, DELAYED RELEASE ORAL at 14:09

## 2018-08-13 RX ADMIN — SODIUM CHLORIDE 1000 ML: 9 INJECTION, SOLUTION INTRAVENOUS at 06:40

## 2018-08-13 RX ADMIN — ENOXAPARIN SODIUM 100 MG: 100 INJECTION SUBCUTANEOUS at 11:08

## 2018-08-13 RX ADMIN — DIPHENHYDRAMINE HYDROCHLORIDE 25 MG: 25 CAPSULE ORAL at 22:40

## 2018-08-13 RX ADMIN — ONDANSETRON 4 MG: 2 INJECTION, SOLUTION INTRAMUSCULAR; INTRAVENOUS at 17:54

## 2018-08-13 RX ADMIN — HYDROCODONE BITARTRATE AND ACETAMINOPHEN 1 TABLET: 5; 325 TABLET ORAL at 11:08

## 2018-08-13 RX ADMIN — ARFORMOTEROL TARTRATE 15 MCG: 15 SOLUTION RESPIRATORY (INHALATION) at 12:36

## 2018-08-13 RX ADMIN — HYDROMORPHONE HYDROCHLORIDE 1 MG: 1 INJECTION, SOLUTION INTRAMUSCULAR; INTRAVENOUS; SUBCUTANEOUS at 06:36

## 2018-08-13 RX ADMIN — BUDESONIDE 0.5 MG: 0.5 SUSPENSION RESPIRATORY (INHALATION) at 12:36

## 2018-08-13 RX ADMIN — ARFORMOTEROL TARTRATE 15 MCG: 15 SOLUTION RESPIRATORY (INHALATION) at 19:08

## 2018-08-13 RX ADMIN — MONTELUKAST SODIUM 10 MG: 10 TABLET, COATED ORAL at 21:42

## 2018-08-13 RX ADMIN — LOSARTAN POTASSIUM 25 MG: 25 TABLET, FILM COATED ORAL at 14:10

## 2018-08-13 RX ADMIN — HYDROCODONE BITARTRATE AND ACETAMINOPHEN 1 TABLET: 5; 325 TABLET ORAL at 20:32

## 2018-08-13 RX ADMIN — HYDROCODONE BITARTRATE AND ACETAMINOPHEN 1 TABLET: 5; 325 TABLET ORAL at 15:08

## 2018-08-13 RX ADMIN — METOPROLOL TARTRATE 12.5 MG: 25 TABLET, FILM COATED ORAL at 14:09

## 2018-08-13 RX ADMIN — ONDANSETRON 4 MG: 2 INJECTION, SOLUTION INTRAMUSCULAR; INTRAVENOUS at 06:33

## 2018-08-13 RX ADMIN — FLECAINIDE ACETATE 50 MG: 50 TABLET ORAL at 21:42

## 2018-08-14 ENCOUNTER — ANESTHESIA EVENT (OUTPATIENT)
Dept: PERIOP | Facility: HOSPITAL | Age: 68
End: 2018-08-14

## 2018-08-14 ENCOUNTER — ANESTHESIA (OUTPATIENT)
Dept: PERIOP | Facility: HOSPITAL | Age: 68
End: 2018-08-14

## 2018-08-14 ENCOUNTER — APPOINTMENT (OUTPATIENT)
Dept: GENERAL RADIOLOGY | Facility: HOSPITAL | Age: 68
End: 2018-08-14

## 2018-08-14 ENCOUNTER — EPISODE CHANGES (OUTPATIENT)
Dept: CASE MANAGEMENT | Facility: OTHER | Age: 68
End: 2018-08-14

## 2018-08-14 LAB
ALBUMIN SERPL-MCNC: 4.3 G/DL (ref 3.4–4.8)
ALBUMIN/GLOB SERPL: 1.5 G/DL (ref 1.5–2.5)
ALP SERPL-CCNC: 260 U/L (ref 40–129)
ALT SERPL W P-5'-P-CCNC: 668 U/L (ref 10–44)
ANION GAP SERPL CALCULATED.3IONS-SCNC: 8.5 MMOL/L (ref 3.6–11.2)
AST SERPL-CCNC: 361 U/L (ref 10–34)
BASOPHILS # BLD AUTO: 0.03 10*3/MM3 (ref 0–0.3)
BASOPHILS NFR BLD AUTO: 0.6 % (ref 0–2)
BILIRUB SERPL-MCNC: 6.3 MG/DL (ref 0.2–1.8)
BUN BLD-MCNC: 16 MG/DL (ref 7–21)
BUN/CREAT SERPL: 11.1 (ref 7–25)
CALCIUM SPEC-SCNC: 9.8 MG/DL (ref 7.7–10)
CHLORIDE SERPL-SCNC: 104 MMOL/L (ref 99–112)
CO2 SERPL-SCNC: 26.5 MMOL/L (ref 24.3–31.9)
CREAT BLD-MCNC: 1.44 MG/DL (ref 0.43–1.29)
DEPRECATED RDW RBC AUTO: 45.2 FL (ref 37–54)
EOSINOPHIL # BLD AUTO: 0.13 10*3/MM3 (ref 0–0.7)
EOSINOPHIL NFR BLD AUTO: 2.4 % (ref 0–7)
ERYTHROCYTE [DISTWIDTH] IN BLOOD BY AUTOMATED COUNT: 14.1 % (ref 11.5–14.5)
GFR SERPL CREATININE-BSD FRML MDRD: 48 ML/MIN/1.73
GLOBULIN UR ELPH-MCNC: 2.9 GM/DL
GLUCOSE BLD-MCNC: 108 MG/DL (ref 70–110)
GLUCOSE BLDC GLUCOMTR-MCNC: 106 MG/DL (ref 70–130)
GLUCOSE BLDC GLUCOMTR-MCNC: 127 MG/DL (ref 70–130)
GLUCOSE BLDC GLUCOMTR-MCNC: 138 MG/DL (ref 70–130)
HCT VFR BLD AUTO: 41.2 % (ref 42–52)
HGB BLD-MCNC: 13.7 G/DL (ref 14–18)
IMM GRANULOCYTES # BLD: 0.09 10*3/MM3 (ref 0–0.03)
IMM GRANULOCYTES NFR BLD: 1.7 % (ref 0–0.5)
LYMPHOCYTES # BLD AUTO: 2.21 10*3/MM3 (ref 1–3)
LYMPHOCYTES NFR BLD AUTO: 41.3 % (ref 16–46)
MCH RBC QN AUTO: 29.9 PG (ref 27–33)
MCHC RBC AUTO-ENTMCNC: 33.3 G/DL (ref 33–37)
MCV RBC AUTO: 90 FL (ref 80–94)
MONOCYTES # BLD AUTO: 0.61 10*3/MM3 (ref 0.1–0.9)
MONOCYTES NFR BLD AUTO: 11.4 % (ref 0–12)
NEUTROPHILS # BLD AUTO: 2.28 10*3/MM3 (ref 1.4–6.5)
NEUTROPHILS NFR BLD AUTO: 42.6 % (ref 40–75)
OSMOLALITY SERPL CALC.SUM OF ELEC: 279.3 MOSM/KG (ref 273–305)
PLATELET # BLD AUTO: 171 10*3/MM3 (ref 130–400)
PMV BLD AUTO: 10.9 FL (ref 6–10)
POTASSIUM BLD-SCNC: 3.6 MMOL/L (ref 3.5–5.3)
PROT SERPL-MCNC: 7.2 G/DL (ref 6–8)
RBC # BLD AUTO: 4.58 10*6/MM3 (ref 4.7–6.1)
SODIUM BLD-SCNC: 139 MMOL/L (ref 135–153)
WBC NRBC COR # BLD: 5.35 10*3/MM3 (ref 4.5–12.5)

## 2018-08-14 PROCEDURE — 80053 COMPREHEN METABOLIC PANEL: CPT | Performed by: SURGERY

## 2018-08-14 PROCEDURE — 99231 SBSQ HOSP IP/OBS SF/LOW 25: CPT | Performed by: SURGERY

## 2018-08-14 PROCEDURE — 25010000002 FENTANYL CITRATE (PF) 100 MCG/2ML SOLUTION: Performed by: NURSE ANESTHETIST, CERTIFIED REGISTERED

## 2018-08-14 PROCEDURE — BF111ZZ FLUOROSCOPY OF BILIARY AND PANCREATIC DUCTS USING LOW OSMOLAR CONTRAST: ICD-10-PCS | Performed by: INTERNAL MEDICINE

## 2018-08-14 PROCEDURE — 94799 UNLISTED PULMONARY SVC/PX: CPT

## 2018-08-14 PROCEDURE — 0F798DZ DILATION OF COMMON BILE DUCT WITH INTRALUMINAL DEVICE, VIA NATURAL OR ARTIFICIAL OPENING ENDOSCOPIC: ICD-10-PCS | Performed by: INTERNAL MEDICINE

## 2018-08-14 PROCEDURE — 25010000002 PROMETHAZINE PER 50 MG: Performed by: ANESTHESIOLOGY

## 2018-08-14 PROCEDURE — 25010000002 PROPOFOL 10 MG/ML EMULSION: Performed by: NURSE ANESTHETIST, CERTIFIED REGISTERED

## 2018-08-14 PROCEDURE — 0F9980Z DRAINAGE OF COMMON BILE DUCT WITH DRAINAGE DEVICE, VIA NATURAL OR ARTIFICIAL OPENING ENDOSCOPIC: ICD-10-PCS | Performed by: INTERNAL MEDICINE

## 2018-08-14 PROCEDURE — C1769 GUIDE WIRE: HCPCS | Performed by: INTERNAL MEDICINE

## 2018-08-14 PROCEDURE — C1726 CATH, BAL DIL, NON-VASCULAR: HCPCS | Performed by: INTERNAL MEDICINE

## 2018-08-14 PROCEDURE — 25010000002 ONDANSETRON PER 1 MG: Performed by: SURGERY

## 2018-08-14 PROCEDURE — 85025 COMPLETE CBC W/AUTO DIFF WBC: CPT | Performed by: SURGERY

## 2018-08-14 PROCEDURE — 88112 CYTOPATH CELL ENHANCE TECH: CPT | Performed by: INTERNAL MEDICINE

## 2018-08-14 PROCEDURE — 76000 FLUOROSCOPY <1 HR PHYS/QHP: CPT

## 2018-08-14 PROCEDURE — 25010000002 ENOXAPARIN PER 10 MG: Performed by: SURGERY

## 2018-08-14 PROCEDURE — 76000 FLUOROSCOPY <1 HR PHYS/QHP: CPT | Performed by: RADIOLOGY

## 2018-08-14 PROCEDURE — C2625 STENT, NON-COR, TEM W/DEL SY: HCPCS | Performed by: INTERNAL MEDICINE

## 2018-08-14 PROCEDURE — 82962 GLUCOSE BLOOD TEST: CPT

## 2018-08-14 PROCEDURE — 25010000002 ONDANSETRON PER 1 MG: Performed by: NURSE ANESTHETIST, CERTIFIED REGISTERED

## 2018-08-14 DEVICE — BILIARY STENT WITH NAVIFLEXTM RX DELIVERY SYSTEM
Type: IMPLANTABLE DEVICE | Site: BILE DUCT | Status: FUNCTIONAL
Brand: ADVANIX™ BILIARY

## 2018-08-14 RX ORDER — MEPERIDINE HYDROCHLORIDE 50 MG/ML
12.5 INJECTION INTRAMUSCULAR; INTRAVENOUS; SUBCUTANEOUS
Status: DISCONTINUED | OUTPATIENT
Start: 2018-08-14 | End: 2018-08-14 | Stop reason: HOSPADM

## 2018-08-14 RX ORDER — PROMETHAZINE HYDROCHLORIDE 25 MG/ML
6.25 INJECTION, SOLUTION INTRAMUSCULAR; INTRAVENOUS ONCE
Status: COMPLETED | OUTPATIENT
Start: 2018-08-14 | End: 2018-08-14

## 2018-08-14 RX ORDER — ONDANSETRON 4 MG/1
4 TABLET, FILM COATED ORAL EVERY 4 HOURS PRN
Status: DISCONTINUED | OUTPATIENT
Start: 2018-08-14 | End: 2018-08-16 | Stop reason: HOSPADM

## 2018-08-14 RX ORDER — FENTANYL CITRATE 50 UG/ML
50 INJECTION, SOLUTION INTRAMUSCULAR; INTRAVENOUS
Status: DISCONTINUED | OUTPATIENT
Start: 2018-08-14 | End: 2018-08-14 | Stop reason: HOSPADM

## 2018-08-14 RX ORDER — LIDOCAINE HYDROCHLORIDE 20 MG/ML
INJECTION, SOLUTION EPIDURAL; INFILTRATION; INTRACAUDAL; PERINEURAL AS NEEDED
Status: DISCONTINUED | OUTPATIENT
Start: 2018-08-14 | End: 2018-08-14 | Stop reason: SURG

## 2018-08-14 RX ORDER — IPRATROPIUM BROMIDE AND ALBUTEROL SULFATE 2.5; .5 MG/3ML; MG/3ML
3 SOLUTION RESPIRATORY (INHALATION) ONCE AS NEEDED
Status: DISCONTINUED | OUTPATIENT
Start: 2018-08-14 | End: 2018-08-14 | Stop reason: HOSPADM

## 2018-08-14 RX ORDER — ONDANSETRON 2 MG/ML
4 INJECTION INTRAMUSCULAR; INTRAVENOUS ONCE AS NEEDED
Status: DISCONTINUED | OUTPATIENT
Start: 2018-08-14 | End: 2018-08-14 | Stop reason: HOSPADM

## 2018-08-14 RX ORDER — ONDANSETRON 2 MG/ML
4 INJECTION INTRAMUSCULAR; INTRAVENOUS ONCE
Status: COMPLETED | OUTPATIENT
Start: 2018-08-14 | End: 2018-08-14

## 2018-08-14 RX ORDER — PROPOFOL 10 MG/ML
VIAL (ML) INTRAVENOUS AS NEEDED
Status: DISCONTINUED | OUTPATIENT
Start: 2018-08-14 | End: 2018-08-14 | Stop reason: SURG

## 2018-08-14 RX ORDER — ONDANSETRON 2 MG/ML
INJECTION INTRAMUSCULAR; INTRAVENOUS AS NEEDED
Status: DISCONTINUED | OUTPATIENT
Start: 2018-08-14 | End: 2018-08-14 | Stop reason: SURG

## 2018-08-14 RX ORDER — ONDANSETRON 4 MG/1
4 TABLET, ORALLY DISINTEGRATING ORAL EVERY 4 HOURS PRN
Status: DISCONTINUED | OUTPATIENT
Start: 2018-08-14 | End: 2018-08-16 | Stop reason: HOSPADM

## 2018-08-14 RX ORDER — OXYCODONE HYDROCHLORIDE AND ACETAMINOPHEN 5; 325 MG/1; MG/1
1 TABLET ORAL ONCE AS NEEDED
Status: DISCONTINUED | OUTPATIENT
Start: 2018-08-14 | End: 2018-08-14 | Stop reason: HOSPADM

## 2018-08-14 RX ORDER — MEPERIDINE HYDROCHLORIDE 25 MG/ML
12.5 INJECTION INTRAMUSCULAR; INTRAVENOUS; SUBCUTANEOUS EVERY 4 HOURS PRN
Status: DISCONTINUED | OUTPATIENT
Start: 2018-08-14 | End: 2018-08-16 | Stop reason: HOSPADM

## 2018-08-14 RX ORDER — SODIUM CHLORIDE 9 MG/ML
50 INJECTION, SOLUTION INTRAVENOUS ONCE
Status: COMPLETED | OUTPATIENT
Start: 2018-08-14 | End: 2018-08-14

## 2018-08-14 RX ORDER — ONDANSETRON 2 MG/ML
4 INJECTION INTRAMUSCULAR; INTRAVENOUS EVERY 4 HOURS PRN
Status: DISCONTINUED | OUTPATIENT
Start: 2018-08-14 | End: 2018-08-16 | Stop reason: HOSPADM

## 2018-08-14 RX ORDER — FAMOTIDINE 10 MG/ML
INJECTION, SOLUTION INTRAVENOUS AS NEEDED
Status: DISCONTINUED | OUTPATIENT
Start: 2018-08-14 | End: 2018-08-14 | Stop reason: SURG

## 2018-08-14 RX ORDER — ROCURONIUM BROMIDE 10 MG/ML
INJECTION, SOLUTION INTRAVENOUS AS NEEDED
Status: DISCONTINUED | OUTPATIENT
Start: 2018-08-14 | End: 2018-08-14 | Stop reason: SURG

## 2018-08-14 RX ORDER — FENTANYL CITRATE 50 UG/ML
INJECTION, SOLUTION INTRAMUSCULAR; INTRAVENOUS AS NEEDED
Status: DISCONTINUED | OUTPATIENT
Start: 2018-08-14 | End: 2018-08-14 | Stop reason: SURG

## 2018-08-14 RX ORDER — SODIUM CHLORIDE, SODIUM LACTATE, POTASSIUM CHLORIDE, CALCIUM CHLORIDE 600; 310; 30; 20 MG/100ML; MG/100ML; MG/100ML; MG/100ML
INJECTION, SOLUTION INTRAVENOUS CONTINUOUS PRN
Status: DISCONTINUED | OUTPATIENT
Start: 2018-08-14 | End: 2018-08-14 | Stop reason: SURG

## 2018-08-14 RX ADMIN — FENTANYL CITRATE 50 MCG: 50 INJECTION INTRAMUSCULAR; INTRAVENOUS at 18:47

## 2018-08-14 RX ADMIN — PROPOFOL 100 MG: 10 INJECTION, EMULSION INTRAVENOUS at 16:27

## 2018-08-14 RX ADMIN — MONTELUKAST SODIUM 10 MG: 10 TABLET, COATED ORAL at 21:40

## 2018-08-14 RX ADMIN — ONDANSETRON 4 MG: 2 INJECTION, SOLUTION INTRAMUSCULAR; INTRAVENOUS at 02:35

## 2018-08-14 RX ADMIN — HYDROCODONE BITARTRATE AND ACETAMINOPHEN 1 TABLET: 5; 325 TABLET ORAL at 12:44

## 2018-08-14 RX ADMIN — METOPROLOL TARTRATE 12.5 MG: 25 TABLET, FILM COATED ORAL at 08:29

## 2018-08-14 RX ADMIN — ONDANSETRON 4 MG: 2 INJECTION, SOLUTION INTRAMUSCULAR; INTRAVENOUS at 20:10

## 2018-08-14 RX ADMIN — PROMETHAZINE HYDROCHLORIDE 6.25 MG: 25 INJECTION INTRAMUSCULAR; INTRAVENOUS at 18:47

## 2018-08-14 RX ADMIN — LOSARTAN POTASSIUM 25 MG: 25 TABLET, FILM COATED ORAL at 08:29

## 2018-08-14 RX ADMIN — ONDANSETRON 4 MG: 2 INJECTION, SOLUTION INTRAMUSCULAR; INTRAVENOUS at 18:21

## 2018-08-14 RX ADMIN — EPHEDRINE SULFATE 20 MG: 50 INJECTION INTRAMUSCULAR; INTRAVENOUS; SUBCUTANEOUS at 16:52

## 2018-08-14 RX ADMIN — HYDROCODONE BITARTRATE AND ACETAMINOPHEN 1 TABLET: 5; 325 TABLET ORAL at 02:35

## 2018-08-14 RX ADMIN — ONDANSETRON 4 MG: 2 INJECTION, SOLUTION INTRAMUSCULAR; INTRAVENOUS at 11:04

## 2018-08-14 RX ADMIN — HYDROCODONE BITARTRATE AND ACETAMINOPHEN 1 TABLET: 5; 325 TABLET ORAL at 19:40

## 2018-08-14 RX ADMIN — BUDESONIDE 0.5 MG: 0.5 SUSPENSION RESPIRATORY (INHALATION) at 06:28

## 2018-08-14 RX ADMIN — MEPERIDINE HYDROCHLORIDE 12.5 MG: 25 INJECTION INTRAMUSCULAR; INTRAVENOUS; SUBCUTANEOUS at 23:00

## 2018-08-14 RX ADMIN — SODIUM CHLORIDE, POTASSIUM CHLORIDE, SODIUM LACTATE AND CALCIUM CHLORIDE: 600; 310; 30; 20 INJECTION, SOLUTION INTRAVENOUS at 16:17

## 2018-08-14 RX ADMIN — ENOXAPARIN SODIUM 100 MG: 100 INJECTION SUBCUTANEOUS at 21:39

## 2018-08-14 RX ADMIN — FENTANYL CITRATE 100 MCG: 50 INJECTION INTRAMUSCULAR; INTRAVENOUS at 16:29

## 2018-08-14 RX ADMIN — PROPOFOL 100 MG: 10 INJECTION, EMULSION INTRAVENOUS at 16:23

## 2018-08-14 RX ADMIN — LIDOCAINE HYDROCHLORIDE 40 MG: 20 INJECTION, SOLUTION EPIDURAL; INFILTRATION; INTRACAUDAL; PERINEURAL at 16:23

## 2018-08-14 RX ADMIN — SODIUM CHLORIDE 50 ML/HR: 9 INJECTION, SOLUTION INTRAVENOUS at 18:47

## 2018-08-14 RX ADMIN — FLECAINIDE ACETATE 50 MG: 50 TABLET ORAL at 21:39

## 2018-08-14 RX ADMIN — FAMOTIDINE 20 MG: 10 INJECTION, SOLUTION INTRAVENOUS at 16:37

## 2018-08-14 RX ADMIN — ROCURONIUM BROMIDE 30 MG: 10 INJECTION INTRAVENOUS at 16:23

## 2018-08-14 RX ADMIN — ONDANSETRON 4 MG: 2 INJECTION, SOLUTION INTRAMUSCULAR; INTRAVENOUS at 16:37

## 2018-08-14 RX ADMIN — ARFORMOTEROL TARTRATE 15 MCG: 15 SOLUTION RESPIRATORY (INHALATION) at 06:28

## 2018-08-14 RX ADMIN — BUDESONIDE 0.5 MG: 0.5 SUSPENSION RESPIRATORY (INHALATION) at 22:43

## 2018-08-14 RX ADMIN — ARFORMOTEROL TARTRATE 15 MCG: 15 SOLUTION RESPIRATORY (INHALATION) at 22:42

## 2018-08-14 RX ADMIN — SUCRALFATE 1 G: 1 TABLET ORAL at 21:39

## 2018-08-14 RX ADMIN — HYDROCODONE BITARTRATE AND ACETAMINOPHEN 1 TABLET: 5; 325 TABLET ORAL at 07:32

## 2018-08-14 RX ADMIN — FLECAINIDE ACETATE 50 MG: 50 TABLET ORAL at 08:29

## 2018-08-14 NOTE — ANESTHESIA PREPROCEDURE EVALUATION
Anesthesia Evaluation                  Airway   Mallampati: II  TM distance: >3 FB  Neck ROM: limited  Possible difficult intubation  Dental - normal exam   (+) poor dentition    Pulmonary - normal exam   (+) COPD,   Cardiovascular - normal exam    (+) hypertension, CAD, dysrhythmias, hyperlipidemia,       Neuro/Psych  (+) psychiatric history,     GI/Hepatic/Renal/Endo    (+) obesity, morbid obesity, GERD,  renal disease, diabetes mellitus,     Musculoskeletal     Abdominal  - normal exam    Bowel sounds: normal.   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, surface ), asthma (child), sleep apnea,   (-) not a smoker  Cardiovascular - normal exam  Exercise tolerance: good (4-7 METS)    NYHA Classification: II  PT is on anticoagulation therapy  Patient on routine beta blocker and Beta blocker given within 24 hours of surgery    (+) hypertension, CAD, dysrhythmias, hyperlipidemia,   (-) past MI, angina, CHF      Neuro/Psych  (+) psychiatric history Depression and Anxiety,     (-) seizures, CVA  GI/Hepatic/Renal/Endo    (+) obesity,  GERD,  renal disease CRI and stones, diabetes mellitus,   (-) morbid obesity, liver disease, hypothyroidism    Musculoskeletal     (+) back pain,   Abdominal  - normal exam    Bowel sounds: normal.   Substance History - negative use     OB/GYN negative ob/gyn ROS         Other   (+) arthritis                     Anesthesia Plan    ASA 3     general     intravenous induction   Anesthetic plan and risks discussed with patient.  Use of blood products discussed with patient  Consented to blood products.             Anesthesia Plan    ASA 3     general     intravenous induction   Anesthetic plan and risks discussed with patient.    Plan discussed with CRNA.

## 2018-08-14 NOTE — ANESTHESIA PROCEDURE NOTES
Airway  Urgency: elective    Airway not difficult    General Information and Staff    Patient location during procedure: OR  Anesthesiologist: COURTNEY CHIANG  CRNA: MICAH BROCK    Indications and Patient Condition  Indications for airway management: airway protection    Preoxygenated: yes  MILS maintained throughout  Mask difficulty assessment: 1 - vent by mask    Final Airway Details  Final airway type: endotracheal airway      Successful airway: ETT  Cuffed: yes   Successful intubation technique: direct laryngoscopy and video laryngoscopy  Facilitating devices/methods: Bougie, intubating stylet, cricoid pressure and anterior pressure/BURP  Endotracheal tube insertion site: oral  Blade: Mcguire  Blade size: #2  ETT size: 7.5 mm  Cormack-Lehane Classification: grade III - view of epiglottis only  Placement verified by: chest auscultation, capnometry and palpation of cuff   Measured from: lips  ETT to lips (cm): 24  Number of attempts at approach: 3 or more    Additional Comments  #1 - DL, Grade III view unable to obtain any view.  #2 - DL, Bougie attempted, unable to place successfully. Mask ventilated until LMA placed.  #3 - #4 LMA placed and ventilated with ease.  #4 - DL, MAC #3 glidescope, ETT with stylet unable to make curve to reach cords. With good view still maintained bougie utilized with success and ETT placed and secured.  Atraumatic ETT placement, dentition unchanged.

## 2018-08-15 LAB
ALBUMIN SERPL-MCNC: 4.3 G/DL (ref 3.4–4.8)
ALP SERPL-CCNC: 254 U/L (ref 40–129)
ALT SERPL W P-5'-P-CCNC: 550 U/L (ref 10–44)
AST SERPL-CCNC: 253 U/L (ref 10–34)
BILIRUB CONJ SERPL-MCNC: 2.6 MG/DL (ref 0–0.2)
BILIRUB INDIRECT SERPL-MCNC: 1.2 MG/DL
BILIRUB SERPL-MCNC: 3.8 MG/DL (ref 0.2–1.8)
GLUCOSE BLDC GLUCOMTR-MCNC: 135 MG/DL (ref 70–130)
GLUCOSE BLDC GLUCOMTR-MCNC: 143 MG/DL (ref 70–130)
GLUCOSE BLDC GLUCOMTR-MCNC: 144 MG/DL (ref 70–130)
GLUCOSE BLDC GLUCOMTR-MCNC: 165 MG/DL (ref 70–130)
GLUCOSE BLDC GLUCOMTR-MCNC: 165 MG/DL (ref 70–130)
PROT SERPL-MCNC: 7.3 G/DL (ref 6–8)

## 2018-08-15 PROCEDURE — 80076 HEPATIC FUNCTION PANEL: CPT | Performed by: INTERNAL MEDICINE

## 2018-08-15 PROCEDURE — 94799 UNLISTED PULMONARY SVC/PX: CPT

## 2018-08-15 PROCEDURE — 25010000002 ENOXAPARIN PER 10 MG: Performed by: SURGERY

## 2018-08-15 PROCEDURE — 86301 IMMUNOASSAY TUMOR CA 19-9: CPT | Performed by: INTERNAL MEDICINE

## 2018-08-15 PROCEDURE — 82962 GLUCOSE BLOOD TEST: CPT

## 2018-08-15 PROCEDURE — 25010000002 ONDANSETRON PER 1 MG: Performed by: SURGERY

## 2018-08-15 PROCEDURE — 63710000001 INSULIN ASPART PER 5 UNITS: Performed by: SURGERY

## 2018-08-15 PROCEDURE — 99231 SBSQ HOSP IP/OBS SF/LOW 25: CPT | Performed by: SURGERY

## 2018-08-15 PROCEDURE — 25010000002 PROMETHAZINE PER 50 MG: Performed by: SURGERY

## 2018-08-15 RX ORDER — PROMETHAZINE HYDROCHLORIDE 25 MG/ML
12.5 INJECTION, SOLUTION INTRAMUSCULAR; INTRAVENOUS EVERY 6 HOURS PRN
Status: DISCONTINUED | OUTPATIENT
Start: 2018-08-15 | End: 2018-08-15 | Stop reason: SDUPTHER

## 2018-08-15 RX ADMIN — MEPERIDINE HYDROCHLORIDE 12.5 MG: 25 INJECTION INTRAMUSCULAR; INTRAVENOUS; SUBCUTANEOUS at 21:54

## 2018-08-15 RX ADMIN — PROMETHAZINE HYDROCHLORIDE 6.25 MG: 25 INJECTION, SOLUTION INTRAMUSCULAR; INTRAVENOUS at 01:13

## 2018-08-15 RX ADMIN — MONTELUKAST SODIUM 10 MG: 10 TABLET, COATED ORAL at 20:20

## 2018-08-15 RX ADMIN — FLECAINIDE ACETATE 50 MG: 50 TABLET ORAL at 08:15

## 2018-08-15 RX ADMIN — MEPERIDINE HYDROCHLORIDE 12.5 MG: 25 INJECTION INTRAMUSCULAR; INTRAVENOUS; SUBCUTANEOUS at 17:05

## 2018-08-15 RX ADMIN — SUCRALFATE 1 G: 1 TABLET ORAL at 17:06

## 2018-08-15 RX ADMIN — MEPERIDINE HYDROCHLORIDE 12.5 MG: 25 INJECTION INTRAMUSCULAR; INTRAVENOUS; SUBCUTANEOUS at 07:27

## 2018-08-15 RX ADMIN — SUCRALFATE 1 G: 1 TABLET ORAL at 11:13

## 2018-08-15 RX ADMIN — PROMETHAZINE HYDROCHLORIDE 6.25 MG: 25 INJECTION, SOLUTION INTRAMUSCULAR; INTRAVENOUS at 13:05

## 2018-08-15 RX ADMIN — MEPERIDINE HYDROCHLORIDE 12.5 MG: 25 INJECTION INTRAMUSCULAR; INTRAVENOUS; SUBCUTANEOUS at 03:47

## 2018-08-15 RX ADMIN — PROMETHAZINE HYDROCHLORIDE 6.25 MG: 25 INJECTION, SOLUTION INTRAMUSCULAR; INTRAVENOUS at 17:05

## 2018-08-15 RX ADMIN — PROMETHAZINE HYDROCHLORIDE 6.25 MG: 25 INJECTION, SOLUTION INTRAMUSCULAR; INTRAVENOUS at 07:34

## 2018-08-15 RX ADMIN — MEPERIDINE HYDROCHLORIDE 12.5 MG: 25 INJECTION INTRAMUSCULAR; INTRAVENOUS; SUBCUTANEOUS at 13:05

## 2018-08-15 RX ADMIN — LOSARTAN POTASSIUM 25 MG: 25 TABLET, FILM COATED ORAL at 08:15

## 2018-08-15 RX ADMIN — APIXABAN 5 MG: 5 TABLET, FILM COATED ORAL at 20:20

## 2018-08-15 RX ADMIN — APIXABAN 5 MG: 5 TABLET, FILM COATED ORAL at 10:29

## 2018-08-15 RX ADMIN — ARFORMOTEROL TARTRATE 15 MCG: 15 SOLUTION RESPIRATORY (INHALATION) at 06:57

## 2018-08-15 RX ADMIN — PROMETHAZINE HYDROCHLORIDE 6.25 MG: 25 INJECTION, SOLUTION INTRAMUSCULAR; INTRAVENOUS at 21:55

## 2018-08-15 RX ADMIN — BUDESONIDE 0.5 MG: 0.5 SUSPENSION RESPIRATORY (INHALATION) at 06:57

## 2018-08-15 RX ADMIN — INSULIN ASPART 2 UNITS: 100 INJECTION, SOLUTION INTRAVENOUS; SUBCUTANEOUS at 11:13

## 2018-08-15 RX ADMIN — BUDESONIDE 0.5 MG: 0.5 SUSPENSION RESPIRATORY (INHALATION) at 18:46

## 2018-08-15 RX ADMIN — ASPIRIN 81 MG: 81 TABLET, DELAYED RELEASE ORAL at 08:15

## 2018-08-15 RX ADMIN — ENOXAPARIN SODIUM 100 MG: 100 INJECTION SUBCUTANEOUS at 10:29

## 2018-08-15 RX ADMIN — ONDANSETRON 4 MG: 2 INJECTION, SOLUTION INTRAMUSCULAR; INTRAVENOUS at 03:56

## 2018-08-15 RX ADMIN — ARFORMOTEROL TARTRATE 15 MCG: 15 SOLUTION RESPIRATORY (INHALATION) at 18:47

## 2018-08-15 RX ADMIN — SUCRALFATE 1 G: 1 TABLET ORAL at 20:20

## 2018-08-15 RX ADMIN — FLECAINIDE ACETATE 50 MG: 50 TABLET ORAL at 20:20

## 2018-08-15 RX ADMIN — SUCRALFATE 1 G: 1 TABLET ORAL at 08:15

## 2018-08-15 NOTE — ANESTHESIA POSTPROCEDURE EVALUATION
Patient: Todd Phillips    Procedure Summary     Date:  08/14/18 Room / Location:  [facility] OR  /  [facility] OR    Anesthesia Start:  1617 Anesthesia Stop:  1751    Procedure:  ENDOSCOPIC RETROGRADE CHOLANGIOPANCREATOGRAPHY WITH PAPILLOTOMY (N/A ) Diagnosis:       Hyperbilirubinemia      (Hyperbilirubinemia [E80.6])    Surgeon:  Scot Su MD Provider:  Scot Bird DO    Anesthesia Type:  general ASA Status:  3          Anesthesia Type: general  Last vitals  BP   114/78 (08/15/18 0705)   Temp   97.7 °F (36.5 °C) (08/15/18 0705)   Pulse   93 (08/15/18 0705)   Resp   20 (08/15/18 0705)     SpO2   96 % (08/15/18 0657)     Post Anesthesia Care and Evaluation    Patient location during evaluation: PHASE II  Patient participation: complete - patient participated  Level of consciousness: awake and alert  Pain score: 1  Pain management: adequate  Airway patency: patent  Anesthetic complications: No anesthetic complications  PONV Status: controlled  Cardiovascular status: acceptable  Respiratory status: acceptable  Hydration status: acceptable

## 2018-08-16 VITALS
WEIGHT: 226.6 LBS | TEMPERATURE: 98.2 F | HEART RATE: 90 BPM | SYSTOLIC BLOOD PRESSURE: 139 MMHG | DIASTOLIC BLOOD PRESSURE: 92 MMHG | OXYGEN SATURATION: 98 % | HEIGHT: 71 IN | BODY MASS INDEX: 31.72 KG/M2 | RESPIRATION RATE: 16 BRPM

## 2018-08-16 LAB
ALBUMIN SERPL-MCNC: 3.9 G/DL (ref 3.4–4.8)
ALBUMIN/GLOB SERPL: 1.3 G/DL (ref 1.5–2.5)
ALP SERPL-CCNC: 224 U/L (ref 40–129)
ALT SERPL W P-5'-P-CCNC: 373 U/L (ref 10–44)
ANION GAP SERPL CALCULATED.3IONS-SCNC: 8.2 MMOL/L (ref 3.6–11.2)
AST SERPL-CCNC: 135 U/L (ref 10–34)
BILIRUB SERPL-MCNC: 1.8 MG/DL (ref 0.2–1.8)
BUN BLD-MCNC: 16 MG/DL (ref 7–21)
BUN/CREAT SERPL: 10 (ref 7–25)
CALCIUM SPEC-SCNC: 9.4 MG/DL (ref 7.7–10)
CANCER AG19-9 SERPL-ACNC: 4032 U/ML (ref 0–35)
CHLORIDE SERPL-SCNC: 105 MMOL/L (ref 99–112)
CO2 SERPL-SCNC: 24.8 MMOL/L (ref 24.3–31.9)
CREAT BLD-MCNC: 1.6 MG/DL (ref 0.43–1.29)
GFR SERPL CREATININE-BSD FRML MDRD: 43 ML/MIN/1.73
GLOBULIN UR ELPH-MCNC: 2.9 GM/DL
GLUCOSE BLD-MCNC: 163 MG/DL (ref 70–110)
GLUCOSE BLDC GLUCOMTR-MCNC: 128 MG/DL (ref 70–130)
GLUCOSE BLDC GLUCOMTR-MCNC: 150 MG/DL (ref 70–130)
LAB AP CASE REPORT: NORMAL
OSMOLALITY SERPL CALC.SUM OF ELEC: 280.4 MOSM/KG (ref 273–305)
POTASSIUM BLD-SCNC: 3.7 MMOL/L (ref 3.5–5.3)
PROT SERPL-MCNC: 6.8 G/DL (ref 6–8)
SODIUM BLD-SCNC: 138 MMOL/L (ref 135–153)

## 2018-08-16 PROCEDURE — 99238 HOSP IP/OBS DSCHRG MGMT 30/<: CPT | Performed by: SURGERY

## 2018-08-16 PROCEDURE — 80053 COMPREHEN METABOLIC PANEL: CPT | Performed by: INTERNAL MEDICINE

## 2018-08-16 PROCEDURE — 25010000002 PROMETHAZINE PER 50 MG: Performed by: SURGERY

## 2018-08-16 PROCEDURE — 94799 UNLISTED PULMONARY SVC/PX: CPT

## 2018-08-16 PROCEDURE — 82962 GLUCOSE BLOOD TEST: CPT

## 2018-08-16 RX ORDER — HYDROCODONE BITARTRATE AND ACETAMINOPHEN 5; 325 MG/1; MG/1
1 TABLET ORAL EVERY 6 HOURS PRN
Qty: 20 TABLET | Refills: 0 | Status: ON HOLD | OUTPATIENT
Start: 2018-08-16 | End: 2018-08-25

## 2018-08-16 RX ORDER — ONDANSETRON 4 MG/1
4 TABLET, FILM COATED ORAL EVERY 4 HOURS PRN
Qty: 30 TABLET | Refills: 0 | Status: SHIPPED | OUTPATIENT
Start: 2018-08-16 | End: 2018-09-17 | Stop reason: HOSPADM

## 2018-08-16 RX ADMIN — METOPROLOL TARTRATE 12.5 MG: 25 TABLET, FILM COATED ORAL at 09:29

## 2018-08-16 RX ADMIN — ARFORMOTEROL TARTRATE 15 MCG: 15 SOLUTION RESPIRATORY (INHALATION) at 06:40

## 2018-08-16 RX ADMIN — PROMETHAZINE HYDROCHLORIDE 6.25 MG: 25 INJECTION, SOLUTION INTRAMUSCULAR; INTRAVENOUS at 04:07

## 2018-08-16 RX ADMIN — SUCRALFATE 1 G: 1 TABLET ORAL at 09:30

## 2018-08-16 RX ADMIN — MEPERIDINE HYDROCHLORIDE 12.5 MG: 25 INJECTION INTRAMUSCULAR; INTRAVENOUS; SUBCUTANEOUS at 10:37

## 2018-08-16 RX ADMIN — APIXABAN 5 MG: 5 TABLET, FILM COATED ORAL at 09:29

## 2018-08-16 RX ADMIN — MEPERIDINE HYDROCHLORIDE 12.5 MG: 25 INJECTION INTRAMUSCULAR; INTRAVENOUS; SUBCUTANEOUS at 04:07

## 2018-08-16 RX ADMIN — PANTOPRAZOLE SODIUM 40 MG: 40 TABLET, DELAYED RELEASE ORAL at 06:40

## 2018-08-16 RX ADMIN — LOSARTAN POTASSIUM 25 MG: 25 TABLET, FILM COATED ORAL at 09:30

## 2018-08-16 RX ADMIN — FLECAINIDE ACETATE 50 MG: 50 TABLET ORAL at 09:30

## 2018-08-16 RX ADMIN — ASPIRIN 81 MG: 81 TABLET, DELAYED RELEASE ORAL at 09:30

## 2018-08-16 RX ADMIN — BUDESONIDE 0.5 MG: 0.5 SUSPENSION RESPIRATORY (INHALATION) at 06:40

## 2018-08-17 ENCOUNTER — EPISODE CHANGES (OUTPATIENT)
Dept: CASE MANAGEMENT | Facility: OTHER | Age: 68
End: 2018-08-17

## 2018-08-18 LAB
BACTERIA SPEC AEROBE CULT: NORMAL
BACTERIA SPEC AEROBE CULT: NORMAL

## 2018-08-21 ENCOUNTER — HOSPITAL ENCOUNTER (INPATIENT)
Facility: HOSPITAL | Age: 68
LOS: 3 days | Discharge: HOME | End: 2018-08-25
Attending: EMERGENCY MEDICINE | Admitting: SURGERY

## 2018-08-21 DIAGNOSIS — A41.9 SEPSIS, DUE TO UNSPECIFIED ORGANISM: Primary | ICD-10-CM
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
## 2018-08-21 DIAGNOSIS — R91.8 PULMONARY NODULES: ICD-10-CM

## 2018-08-21 DIAGNOSIS — J18.9 HOSPITAL-ACQUIRED PNEUMONIA: ICD-10-CM

## 2018-08-21 LAB
APTT PPP: 27.7 SECONDS (ref 23.8–36.1)
BASOPHILS # BLD AUTO: 0.01 10*3/MM3 (ref 0–0.3)
BASOPHILS NFR BLD AUTO: 0.1 % (ref 0–2)
BILIRUB UR QL STRIP: ABNORMAL
CLARITY UR: CLEAR
COLOR UR: ABNORMAL
DEPRECATED RDW RBC AUTO: 44.9 FL (ref 37–54)
EOSINOPHIL # BLD AUTO: 0.02 10*3/MM3 (ref 0–0.7)
EOSINOPHIL NFR BLD AUTO: 0.2 % (ref 0–7)
ERYTHROCYTE [DISTWIDTH] IN BLOOD BY AUTOMATED COUNT: 14 % (ref 11.5–14.5)
GLUCOSE UR STRIP-MCNC: ABNORMAL MG/DL
HCT VFR BLD AUTO: 38.7 % (ref 42–52)
HGB BLD-MCNC: 13 G/DL (ref 14–18)
HGB UR QL STRIP.AUTO: NEGATIVE
IMM GRANULOCYTES # BLD: 0.05 10*3/MM3 (ref 0–0.03)
IMM GRANULOCYTES NFR BLD: 0.5 % (ref 0–0.5)
INR PPP: 1.08 (ref 0.9–1.1)
KETONES UR QL STRIP: ABNORMAL
LEUKOCYTE ESTERASE UR QL STRIP.AUTO: NEGATIVE
LIPASE SERPL-CCNC: 33 U/L (ref 13–60)
LYMPHOCYTES # BLD AUTO: 0.53 10*3/MM3 (ref 1–3)
LYMPHOCYTES NFR BLD AUTO: 4.8 % (ref 16–46)
MCH RBC QN AUTO: 30 PG (ref 27–33)
MCHC RBC AUTO-ENTMCNC: 33.6 G/DL (ref 33–37)
MCV RBC AUTO: 89.2 FL (ref 80–94)
MONOCYTES # BLD AUTO: 0.77 10*3/MM3 (ref 0.1–0.9)
MONOCYTES NFR BLD AUTO: 7 % (ref 0–12)
NEUTROPHILS # BLD AUTO: 9.66 10*3/MM3 (ref 1.4–6.5)
NEUTROPHILS NFR BLD AUTO: 87.4 % (ref 40–75)
NITRITE UR QL STRIP: NEGATIVE
PH UR STRIP.AUTO: 7 [PH] (ref 5–8)
PLATELET # BLD AUTO: 210 10*3/MM3 (ref 130–400)
PMV BLD AUTO: 10.5 FL (ref 6–10)
PROT UR QL STRIP: NEGATIVE
PROTHROMBIN TIME: 14.3 SECONDS (ref 11–15.4)
RBC # BLD AUTO: 4.34 10*6/MM3 (ref 4.7–6.1)
SP GR UR STRIP: 1.02 (ref 1–1.03)
UROBILINOGEN UR QL STRIP: ABNORMAL
WBC NRBC COR # BLD: 11.04 10*3/MM3 (ref 4.5–12.5)

## 2018-08-21 PROCEDURE — 87186 SC STD MICRODIL/AGAR DIL: CPT | Performed by: PHYSICIAN ASSISTANT

## 2018-08-21 PROCEDURE — 85610 PROTHROMBIN TIME: CPT | Performed by: PHYSICIAN ASSISTANT

## 2018-08-21 PROCEDURE — 36415 COLL VENOUS BLD VENIPUNCTURE: CPT

## 2018-08-21 PROCEDURE — 87077 CULTURE AEROBIC IDENTIFY: CPT | Performed by: PHYSICIAN ASSISTANT

## 2018-08-21 PROCEDURE — 25010000002 HYDROMORPHONE PER 4 MG: Performed by: EMERGENCY MEDICINE

## 2018-08-21 PROCEDURE — 25010000002 MORPHINE PER 10 MG: Performed by: EMERGENCY MEDICINE

## 2018-08-21 PROCEDURE — 87040 BLOOD CULTURE FOR BACTERIA: CPT | Performed by: PHYSICIAN ASSISTANT

## 2018-08-21 PROCEDURE — 85652 RBC SED RATE AUTOMATED: CPT | Performed by: PHYSICIAN ASSISTANT

## 2018-08-21 PROCEDURE — 85025 COMPLETE CBC W/AUTO DIFF WBC: CPT | Performed by: PHYSICIAN ASSISTANT

## 2018-08-21 PROCEDURE — 87150 DNA/RNA AMPLIFIED PROBE: CPT | Performed by: PHYSICIAN ASSISTANT

## 2018-08-21 PROCEDURE — 83690 ASSAY OF LIPASE: CPT | Performed by: PHYSICIAN ASSISTANT

## 2018-08-21 PROCEDURE — 99285 EMERGENCY DEPT VISIT HI MDM: CPT

## 2018-08-21 PROCEDURE — 80053 COMPREHEN METABOLIC PANEL: CPT | Performed by: PHYSICIAN ASSISTANT

## 2018-08-21 PROCEDURE — 83605 ASSAY OF LACTIC ACID: CPT | Performed by: PHYSICIAN ASSISTANT

## 2018-08-21 PROCEDURE — 81003 URINALYSIS AUTO W/O SCOPE: CPT | Performed by: PHYSICIAN ASSISTANT

## 2018-08-21 PROCEDURE — 85730 THROMBOPLASTIN TIME PARTIAL: CPT | Performed by: PHYSICIAN ASSISTANT

## 2018-08-21 RX ORDER — SODIUM CHLORIDE 0.9 % (FLUSH) 0.9 %
10 SYRINGE (ML) INJECTION AS NEEDED
Status: DISCONTINUED | OUTPATIENT
Start: 2018-08-21 | End: 2018-08-25 | Stop reason: HOSPADM

## 2018-08-21 RX ORDER — HYDROMORPHONE HYDROCHLORIDE 1 MG/ML
0.5 INJECTION, SOLUTION INTRAMUSCULAR; INTRAVENOUS; SUBCUTANEOUS ONCE
Status: COMPLETED | OUTPATIENT
Start: 2018-08-21 | End: 2018-08-21

## 2018-08-21 RX ADMIN — HYDROMORPHONE HYDROCHLORIDE 0.5 MG: 1 INJECTION, SOLUTION INTRAMUSCULAR; INTRAVENOUS; SUBCUTANEOUS at 23:21

## 2018-08-21 RX ADMIN — SODIUM CHLORIDE 1000 ML: 9 INJECTION, SOLUTION INTRAVENOUS at 22:40

## 2018-08-22 ENCOUNTER — APPOINTMENT (OUTPATIENT)
Dept: CT IMAGING | Facility: HOSPITAL | Age: 68
End: 2018-08-22

## 2018-08-22 DIAGNOSIS — K86.89 PANCREATIC MASS: Primary | ICD-10-CM

## 2018-08-22 PROBLEM — A41.9 SEPSIS (HCC): Status: ACTIVE | Noted: 2018-08-22

## 2018-08-22 LAB
ALBUMIN SERPL-MCNC: 4.3 G/DL (ref 3.4–4.8)
ALBUMIN/GLOB SERPL: 1.5 G/DL (ref 1.5–2.5)
ALP SERPL-CCNC: 407 U/L (ref 40–129)
ALT SERPL W P-5'-P-CCNC: 387 U/L (ref 10–44)
ANION GAP SERPL CALCULATED.3IONS-SCNC: 9.2 MMOL/L (ref 3.6–11.2)
AST SERPL-CCNC: 397 U/L (ref 10–34)
BACTERIA BLD CULT: ABNORMAL
BILIRUB SERPL-MCNC: 2.3 MG/DL (ref 0.2–1.8)
BUN BLD-MCNC: 23 MG/DL (ref 7–21)
BUN/CREAT SERPL: 13.9 (ref 7–25)
CALCIUM SPEC-SCNC: 9.5 MG/DL (ref 7.7–10)
CHLORIDE SERPL-SCNC: 104 MMOL/L (ref 99–112)
CO2 SERPL-SCNC: 21.8 MMOL/L (ref 24.3–31.9)
CREAT BLD-MCNC: 1.65 MG/DL (ref 0.43–1.29)
CRP SERPL-MCNC: 1.99 MG/DL (ref 0–0.99)
D-LACTATE SERPL-SCNC: 1.4 MMOL/L (ref 0.5–2)
D-LACTATE SERPL-SCNC: 2.2 MMOL/L (ref 0.5–2)
ERYTHROCYTE [SEDIMENTATION RATE] IN BLOOD: 37 MM/HR (ref 0–20)
GFR SERPL CREATININE-BSD FRML MDRD: 41 ML/MIN/1.73
GLOBULIN UR ELPH-MCNC: 2.8 GM/DL
GLUCOSE BLD-MCNC: 261 MG/DL (ref 70–110)
HOLD SPECIMEN: NORMAL
OSMOLALITY SERPL CALC.SUM OF ELEC: 282.8 MOSM/KG (ref 273–305)
POTASSIUM BLD-SCNC: 4.1 MMOL/L (ref 3.5–5.3)
PROT SERPL-MCNC: 7.1 G/DL (ref 6–8)
SODIUM BLD-SCNC: 135 MMOL/L (ref 135–153)

## 2018-08-22 PROCEDURE — 25010000002 HYDROMORPHONE PER 4 MG: Performed by: SURGERY

## 2018-08-22 PROCEDURE — 94799 UNLISTED PULMONARY SVC/PX: CPT

## 2018-08-22 PROCEDURE — 25010000002 VANCOMYCIN PER 500 MG: Performed by: PHYSICIAN ASSISTANT

## 2018-08-22 PROCEDURE — 83605 ASSAY OF LACTIC ACID: CPT | Performed by: PHYSICIAN ASSISTANT

## 2018-08-22 PROCEDURE — 25010000002 PIPERACILLIN-TAZOBACTAM: Performed by: PHYSICIAN ASSISTANT

## 2018-08-22 PROCEDURE — 25010000002 HYDROMORPHONE PER 4 MG: Performed by: INTERNAL MEDICINE

## 2018-08-22 PROCEDURE — 99223 1ST HOSP IP/OBS HIGH 75: CPT | Performed by: SURGERY

## 2018-08-22 PROCEDURE — 74177 CT ABD & PELVIS W/CONTRAST: CPT | Performed by: RADIOLOGY

## 2018-08-22 PROCEDURE — 25010000002 IOPAMIDOL 61 % SOLUTION: Performed by: SURGERY

## 2018-08-22 PROCEDURE — 25010000002 ONDANSETRON PER 1 MG: Performed by: PHYSICIAN ASSISTANT

## 2018-08-22 PROCEDURE — 74177 CT ABD & PELVIS W/CONTRAST: CPT

## 2018-08-22 PROCEDURE — 94640 AIRWAY INHALATION TREATMENT: CPT

## 2018-08-22 PROCEDURE — 25010000002 HYDROMORPHONE PER 4 MG: Performed by: EMERGENCY MEDICINE

## 2018-08-22 PROCEDURE — 25010000002 PIPERACILLIN-TAZOBACTAM: Performed by: SURGERY

## 2018-08-22 PROCEDURE — 86140 C-REACTIVE PROTEIN: CPT | Performed by: PHYSICIAN ASSISTANT

## 2018-08-22 PROCEDURE — 74176 CT ABD & PELVIS W/O CONTRAST: CPT | Performed by: RADIOLOGY

## 2018-08-22 PROCEDURE — 25810000003 SODIUM CHLORIDE 0.9 % WITH KCL 20 MEQ 20-0.9 MEQ/L-% SOLUTION: Performed by: SURGERY

## 2018-08-22 PROCEDURE — 74176 CT ABD & PELVIS W/O CONTRAST: CPT

## 2018-08-22 RX ORDER — SODIUM CHLORIDE AND POTASSIUM CHLORIDE 150; 900 MG/100ML; MG/100ML
100 INJECTION, SOLUTION INTRAVENOUS CONTINUOUS
Status: DISCONTINUED | OUTPATIENT
Start: 2018-08-22 | End: 2018-08-25 | Stop reason: HOSPADM

## 2018-08-22 RX ORDER — ONDANSETRON 4 MG/1
4 TABLET, FILM COATED ORAL EVERY 4 HOURS PRN
Status: DISCONTINUED | OUTPATIENT
Start: 2018-08-22 | End: 2018-08-25 | Stop reason: HOSPADM

## 2018-08-22 RX ORDER — FLUOXETINE HYDROCHLORIDE 20 MG/1
20 CAPSULE ORAL NIGHTLY
Status: DISCONTINUED | OUTPATIENT
Start: 2018-08-22 | End: 2018-08-25 | Stop reason: HOSPADM

## 2018-08-22 RX ORDER — NALOXONE HCL 0.4 MG/ML
0.4 VIAL (ML) INJECTION
Status: DISCONTINUED | OUTPATIENT
Start: 2018-08-22 | End: 2018-08-25 | Stop reason: HOSPADM

## 2018-08-22 RX ORDER — ATORVASTATIN CALCIUM 10 MG/1
5 TABLET, FILM COATED ORAL DAILY
Status: CANCELLED | OUTPATIENT
Start: 2018-08-22

## 2018-08-22 RX ORDER — NITROGLYCERIN 0.4 MG/1
0.4 TABLET SUBLINGUAL AS NEEDED
Status: DISCONTINUED | OUTPATIENT
Start: 2018-08-22 | End: 2018-08-25 | Stop reason: HOSPADM

## 2018-08-22 RX ORDER — HYDROMORPHONE HYDROCHLORIDE 1 MG/ML
0.5 INJECTION, SOLUTION INTRAMUSCULAR; INTRAVENOUS; SUBCUTANEOUS ONCE
Status: COMPLETED | OUTPATIENT
Start: 2018-08-22 | End: 2018-08-22

## 2018-08-22 RX ORDER — FLUOXETINE HYDROCHLORIDE 20 MG/1
40 CAPSULE ORAL EVERY MORNING
Status: DISCONTINUED | OUTPATIENT
Start: 2018-08-23 | End: 2018-08-25 | Stop reason: HOSPADM

## 2018-08-22 RX ORDER — HYDROCODONE BITARTRATE AND ACETAMINOPHEN 5; 325 MG/1; MG/1
1 TABLET ORAL EVERY 4 HOURS PRN
Status: DISCONTINUED | OUTPATIENT
Start: 2018-08-22 | End: 2018-08-23

## 2018-08-22 RX ORDER — LOSARTAN POTASSIUM 25 MG/1
25 TABLET ORAL DAILY
Status: DISCONTINUED | OUTPATIENT
Start: 2018-08-22 | End: 2018-08-25 | Stop reason: HOSPADM

## 2018-08-22 RX ORDER — FLECAINIDE ACETATE 50 MG/1
50 TABLET ORAL EVERY 12 HOURS SCHEDULED
Status: DISCONTINUED | OUTPATIENT
Start: 2018-08-22 | End: 2018-08-25 | Stop reason: HOSPADM

## 2018-08-22 RX ORDER — PIOGLITAZONEHYDROCHLORIDE 15 MG/1
30 TABLET ORAL DAILY
Status: DISCONTINUED | OUTPATIENT
Start: 2018-08-22 | End: 2018-08-25 | Stop reason: HOSPADM

## 2018-08-22 RX ORDER — POLYETHYLENE GLYCOL 3350 17 G/17G
17 POWDER, FOR SOLUTION ORAL DAILY PRN
Status: DISCONTINUED | OUTPATIENT
Start: 2018-08-22 | End: 2018-08-25 | Stop reason: HOSPADM

## 2018-08-22 RX ORDER — HYDROCODONE BITARTRATE AND ACETAMINOPHEN 5; 325 MG/1; MG/1
1 TABLET ORAL EVERY 6 HOURS PRN
Status: CANCELLED | OUTPATIENT
Start: 2018-08-22 | End: 2018-08-23

## 2018-08-22 RX ORDER — ONDANSETRON 2 MG/ML
4 INJECTION INTRAMUSCULAR; INTRAVENOUS ONCE
Status: COMPLETED | OUTPATIENT
Start: 2018-08-22 | End: 2018-08-22

## 2018-08-22 RX ORDER — BUDESONIDE 0.5 MG/2ML
0.5 INHALANT ORAL
Status: DISCONTINUED | OUTPATIENT
Start: 2018-08-22 | End: 2018-08-25 | Stop reason: HOSPADM

## 2018-08-22 RX ORDER — MONTELUKAST SODIUM 10 MG/1
10 TABLET ORAL NIGHTLY
Status: DISCONTINUED | OUTPATIENT
Start: 2018-08-22 | End: 2018-08-25 | Stop reason: HOSPADM

## 2018-08-22 RX ORDER — ALBUTEROL SULFATE 2.5 MG/3ML
2.5 SOLUTION RESPIRATORY (INHALATION) EVERY 6 HOURS PRN
Status: DISCONTINUED | OUTPATIENT
Start: 2018-08-22 | End: 2018-08-25 | Stop reason: HOSPADM

## 2018-08-22 RX ORDER — PANTOPRAZOLE SODIUM 40 MG/1
40 TABLET, DELAYED RELEASE ORAL EVERY MORNING
Status: DISCONTINUED | OUTPATIENT
Start: 2018-08-23 | End: 2018-08-25 | Stop reason: HOSPADM

## 2018-08-22 RX ORDER — BUDESONIDE 0.5 MG/2ML
0.5 INHALANT ORAL
Status: CANCELLED | OUTPATIENT
Start: 2018-08-22

## 2018-08-22 RX ORDER — SODIUM CHLORIDE 0.9 % (FLUSH) 0.9 %
1-10 SYRINGE (ML) INJECTION AS NEEDED
Status: DISCONTINUED | OUTPATIENT
Start: 2018-08-22 | End: 2018-08-25 | Stop reason: HOSPADM

## 2018-08-22 RX ORDER — OMEGA-3S/DHA/EPA/FISH OIL/D3 300MG-1000
1000 CAPSULE ORAL DAILY
Status: DISCONTINUED | OUTPATIENT
Start: 2018-08-22 | End: 2018-08-25 | Stop reason: HOSPADM

## 2018-08-22 RX ORDER — COLCHICINE 0.6 MG/1
0.6 TABLET ORAL DAILY
Status: DISCONTINUED | OUTPATIENT
Start: 2018-08-22 | End: 2018-08-24

## 2018-08-22 RX ADMIN — VANCOMYCIN HYDROCHLORIDE 2000 MG: 5 INJECTION, POWDER, LYOPHILIZED, FOR SOLUTION INTRAVENOUS at 01:53

## 2018-08-22 RX ADMIN — HYDROMORPHONE HYDROCHLORIDE 1 MG: 1 INJECTION, SOLUTION INTRAMUSCULAR; INTRAVENOUS; SUBCUTANEOUS at 23:47

## 2018-08-22 RX ADMIN — METOPROLOL TARTRATE 12.5 MG: 25 TABLET, FILM COATED ORAL at 22:00

## 2018-08-22 RX ADMIN — CHOLECALCIFEROL TAB 10 MCG (400 UNIT) 1000 UNITS: 10 TAB at 22:00

## 2018-08-22 RX ADMIN — FLECAINIDE ACETATE 50 MG: 50 TABLET ORAL at 22:00

## 2018-08-22 RX ADMIN — LOSARTAN POTASSIUM 25 MG: 25 TABLET, FILM COATED ORAL at 22:01

## 2018-08-22 RX ADMIN — COLCHICINE 0.6 MG: 0.6 TABLET, FILM COATED ORAL at 22:01

## 2018-08-22 RX ADMIN — TAZOBACTAM SODIUM AND PIPERACILLIN SODIUM 4.5 G: .5; 4 INJECTION, POWDER, LYOPHILIZED, FOR SOLUTION INTRAVENOUS at 01:22

## 2018-08-22 RX ADMIN — VANCOMYCIN HYDROCHLORIDE 1000 MG: 5 INJECTION, POWDER, LYOPHILIZED, FOR SOLUTION INTRAVENOUS at 14:42

## 2018-08-22 RX ADMIN — ALBUTEROL SULFATE 2.5 MG: 2.5 SOLUTION RESPIRATORY (INHALATION) at 21:43

## 2018-08-22 RX ADMIN — BUDESONIDE 0.5 MG: 0.5 SUSPENSION RESPIRATORY (INHALATION) at 21:43

## 2018-08-22 RX ADMIN — SODIUM CHLORIDE 1000 ML: 9 INJECTION, SOLUTION INTRAVENOUS at 21:12

## 2018-08-22 RX ADMIN — HYDROMORPHONE HYDROCHLORIDE 0.5 MG: 1 INJECTION, SOLUTION INTRAMUSCULAR; INTRAVENOUS; SUBCUTANEOUS at 11:58

## 2018-08-22 RX ADMIN — SODIUM CHLORIDE 1000 ML: 9 INJECTION, SOLUTION INTRAVENOUS at 19:30

## 2018-08-22 RX ADMIN — MONTELUKAST SODIUM 10 MG: 10 TABLET, COATED ORAL at 22:01

## 2018-08-22 RX ADMIN — ONDANSETRON 4 MG: 2 INJECTION, SOLUTION INTRAMUSCULAR; INTRAVENOUS at 11:57

## 2018-08-22 RX ADMIN — POTASSIUM CHLORIDE AND SODIUM CHLORIDE 100 ML/HR: 900; 150 INJECTION, SOLUTION INTRAVENOUS at 23:47

## 2018-08-22 RX ADMIN — HYDROMORPHONE HYDROCHLORIDE 1 MG: 1 INJECTION, SOLUTION INTRAMUSCULAR; INTRAVENOUS; SUBCUTANEOUS at 21:05

## 2018-08-22 RX ADMIN — TAZOBACTAM SODIUM AND PIPERACILLIN SODIUM 4.5 G: .5; 4 INJECTION, POWDER, LYOPHILIZED, FOR SOLUTION INTRAVENOUS at 21:58

## 2018-08-22 RX ADMIN — IOPAMIDOL 100 ML: 612 INJECTION, SOLUTION INTRAVENOUS at 22:00

## 2018-08-22 RX ADMIN — PIOGLITAZONE 30 MG: 15 TABLET ORAL at 21:59

## 2018-08-22 RX ADMIN — HYDROMORPHONE HYDROCHLORIDE 1 MG: 1 INJECTION, SOLUTION INTRAMUSCULAR; INTRAVENOUS; SUBCUTANEOUS at 04:18

## 2018-08-22 RX ADMIN — SODIUM CHLORIDE 1000 ML: 9 INJECTION, SOLUTION INTRAVENOUS at 00:23

## 2018-08-22 RX ADMIN — FLUOXETINE HYDROCHLORIDE 20 MG: 20 CAPSULE ORAL at 22:00

## 2018-08-22 RX ADMIN — HYDROMORPHONE HYDROCHLORIDE 1 MG: 1 INJECTION, SOLUTION INTRAMUSCULAR; INTRAVENOUS; SUBCUTANEOUS at 18:18

## 2018-08-22 RX ADMIN — TAZOBACTAM SODIUM AND PIPERACILLIN SODIUM 4.5 G: .5; 4 INJECTION, POWDER, LYOPHILIZED, FOR SOLUTION INTRAVENOUS at 11:24

## 2018-08-22 NOTE — ED NOTES
Called [facility] for an update on bed status. Talked to Darby, She advised that the pt has been moved up to 2nd on the list and that it could be late this evening before they could get him in. Provider has been notified.      Janett Mcguire  08/22/18 1238

## 2018-08-22 NOTE — ED NOTES
"Patient presents to Emergency Department with generalized abdominal pain that radiates into his back \"7\" on pain scale 0-10.      Idalmis Roberson RN  08/22/18 0203    "

## 2018-08-22 NOTE — ED NOTES
Called [facility] for a possible update on pts waiting list for bed. Talked to Reta, she could not give me a time but stated he is #3 on the list as of now. Well check back for updates. Informed Provider.      Janett Mcguire  08/22/18 0756

## 2018-08-22 NOTE — PLAN OF CARE
Problem: Patient Care Overview  Goal: Plan of Care Review  Outcome: Ongoing (interventions implemented as appropriate)    Goal: Individualization and Mutuality  Outcome: Ongoing (interventions implemented as appropriate)    Goal: Discharge Needs Assessment  Outcome: Ongoing (interventions implemented as appropriate)    Goal: Interprofessional Rounds/Family Conf  Outcome: Ongoing (interventions implemented as appropriate)      Problem: Fall Risk (Adult)  Goal: Identify Related Risk Factors and Signs and Symptoms  Outcome: Ongoing (interventions implemented as appropriate)    Goal: Absence of Fall  Outcome: Ongoing (interventions implemented as appropriate)      Problem: Skin Injury Risk (Adult)  Goal: Identify Related Risk Factors and Signs and Symptoms  Outcome: Ongoing (interventions implemented as appropriate)

## 2018-08-22 NOTE — NURSING NOTE
Called DR Downs to clarify contrast dye allergy, and CT w/ IV and oral contrast order. Patient has Stage III kidney disease.     Dr Downs states to give 1 L NS bolus over one hour prior to CT, and to give 1 L NS bolus over 3 hours after CT.     Called Ronak in CT to co ordinate patient care. Stated he would bring contrast to floor, and that he would suggest starting fluids as patient starts second bottle of contrast related to timing getting patient to CT.     Will update on coming shift of detailed patient care plan at shift change.

## 2018-08-22 NOTE — NURSING NOTE
Patient requesting pain medication at this time. Notified Dr Downs that Morphine is order, and listed as an allergy with palpitations as the result. See new orders for Dilaudid and d/c orders for Morphine.

## 2018-08-22 NOTE — ED NOTES
Called [facility] call center at this time to page hospitalist on call, Lori Crespo  08/22/18 [id number]

## 2018-08-22 NOTE — ED NOTES
Falls bracelet placed on pt in triage, pt taken to treatment room via WC.     Anna Dacosta RN  08/21/18 [id number]

## 2018-08-22 NOTE — ED NOTES
Pt on waiting list for admission to [facility] at this time     Lori Mittal  08/22/18 0620

## 2018-08-22 NOTE — ED PROVIDER NOTES
Subjective   Patient presents to the ED today related to abdominal pain, nausea, and fever. Patient had laparoscopic cholecystecomy on Aug 10. On Aug 14 he had a stent placed in the bile duct. He is scheduled to have an endoscopic ultrasound of the pancreas next week.  Patient had an elevated CA 19 level. Patient had temp at home 102.5. He has been taking Norco 5 for pain. Patient has also been taking Zofran 4 mg for nausea.         History provided by:  Patient   used: No    Abdominal Pain   Pain location:  Epigastric and RUQ  Pain quality: sharp    Duration:  1 day  Timing:  Constant  Progression:  Worsening  Chronicity:  New  Context: previous surgery    Associated symptoms: fever and nausea        Review of Systems   Constitutional: Positive for fever.   HENT: Negative.    Eyes: Negative.    Respiratory: Negative.    Cardiovascular: Negative.    Gastrointestinal: Positive for abdominal pain and nausea.   Endocrine: Negative.    Genitourinary: Negative.    Musculoskeletal: Negative.    Skin: Negative.    Allergic/Immunologic: Negative.    Neurological: Negative.    Hematological: Negative.    Psychiatric/Behavioral: Negative.    All other systems reviewed and are negative.      Past Medical History:   Diagnosis Date   • Abnormal ECG    • Antral gastritis    • Atrial fibrillation (CMS/HCC)    • Chest pain    • COPD (chronic obstructive pulmonary disease) (CMS/HCC)    • Coronary artery disease    • Diabetes mellitus (CMS/HCC)     type 2    • Duodenitis    • Emphysema of lung (CMS/HCC)    • GERD (gastroesophageal reflux disease)    • Hyperlipidemia    • Hypertension    • Kidney stone    • Palpitations    • Reflux esophagitis    • Renal failure     stage 3 kidney disease       Allergies   Allergen Reactions   • Contrast Dye Other (See Comments)     Can't have due to kidney failure per family   • Morphine And Related Palpitations     Morphine caused pt to isaac - several years ago         Past  Surgical History:   Procedure Laterality Date   • CARDIAC CATHETERIZATION  11/01/1999   • CARDIOVASCULAR STRESS TEST  09/2012   • CHOLECYSTECTOMY N/A 8/10/2018    Procedure: CHOLECYSTECTOMY LAPAROSCOPIC;  Surgeon: Ronak Downs MD;  Location: [facility];  Service: General   • ECHO - CONVERTED  09/2012   • ERCP N/A 8/14/2018    Procedure: ENDOSCOPIC RETROGRADE CHOLANGIOPANCREATOGRAPHY WITH PAPILLOTOMY;  Surgeon: Scot Su MD;  Location: [facility];  Service: Gastroenterology   • KIDNEY STONE SURGERY     • UPPER GASTROINTESTINAL ENDOSCOPY  08/30/2012       Family History   Problem Relation Age of Onset   • Heart attack Mother    • Heart disease Mother    • Stroke Mother    • Kidney disease Mother    • Heart failure Mother    • Lung disease Father    • Tuberculosis Father    • Diabetes Sister    • Heart attack Brother    • Heart failure Brother    • Heart disease Brother    • Diabetes Sister    • No Known Problems Brother    • No Known Problems Brother        Social History     Social History   • Marital status:      Social History Main Topics   • Smoking status: Never Smoker   • Smokeless tobacco: Never Used   • Alcohol use No   • Drug use: No   • Sexual activity: Defer     Other Topics Concern   • Not on file           Objective   Physical Exam   Constitutional: He is oriented to person, place, and time. He appears well-developed and well-nourished.   HENT:   Head: Normocephalic and atraumatic.   Right Ear: External ear normal.   Left Ear: External ear normal.   Nose: Nose normal.   Mouth/Throat: Oropharynx is clear and moist.   Eyes: Pupils are equal, round, and reactive to light. Conjunctivae and EOM are normal.   Neck: Normal range of motion. Neck supple.   Cardiovascular: Normal rate, regular rhythm, normal heart sounds and intact distal pulses.    Pulmonary/Chest: Effort normal and breath sounds normal.   Abdominal: Soft. Bowel sounds are normal. There is tenderness.   S/p lap ashlyn     Musculoskeletal: Normal range of motion.   Neurological: He is alert and oriented to person, place, and time.   Skin: Skin is warm and dry.   Nursing note and vitals reviewed.      Procedures           ED Course  ED Course as of Aug 22 1905   Wed Aug 22, 2018   0150 Bibasilar opacities, subsegmental consolidation in the left lower lobe, atelectasis, infection, or inflammation.  A 2-3mm nodules in the right lower lobe, a 4mm nodule in the left lung base. No f/u is necessary. For high risk patients an optional chest CT at 12 months could be performed.  Prior cholecystectomy.  Mild nonspecific prominence of the head of the pancreas. Nonspecific nonbstructive bowel gas patter per [facility]  CT Abdomen Pelvis Stone Protocol [ML]   0229 Discussed with Dr. Reddy- no GI coverage.   [ML]   0238 Spoke with Keron with  [facility] on divert   [ML]   0422 Dr. Horn accepts at [facility]   [ML]   0748 Sign out to Yakelin Romano   [ML]   1431 Awaiting bed at [facility]   Number 2 in line for bed   Lab called pos blood culture klebsiella   Discussed with Dr Alanis continue with Zosyn and give 1 g vancomycin  [LC]   1542 Dr Downs called and knows pt will admit   [LC]      ED Course User Index  [LC] Yakelin Romano PA  [ML] Khloe Fuentes PA                  [facility]      Final diagnoses:   Sepsis, due to unspecified organism (CMS/[facility])   Hospital-acquired pneumonia   Pulmonary nodules            Yakelin Romano PA  08/22/18 1905

## 2018-08-22 NOTE — ED NOTES
Patient moved onto hospital bed to promote comfort. Assisted patient to position onto Left side. Patient tolerated well.      Idalmis Roberson RN  08/22/18 0621

## 2018-08-22 NOTE — ED NOTES
Patient requesting pain medication at this time. Provider aware. No new orders given, will continue to monitor and follow plan of care.      Masha Metcalf RN  08/22/18 [id number]

## 2018-08-22 NOTE — ED NOTES
Changed pt into hospital gown. Placed clothes and belongings into a Personal Belonging Bag and gave to family. Pt tolerated it well.      Bacilio Smallwood  08/22/18 0637

## 2018-08-23 LAB
ANION GAP SERPL CALCULATED.3IONS-SCNC: 6.6 MMOL/L (ref 3.6–11.2)
BASOPHILS # BLD AUTO: 0.01 10*3/MM3 (ref 0–0.3)
BASOPHILS NFR BLD AUTO: 0.1 % (ref 0–2)
BUN BLD-MCNC: 14 MG/DL (ref 7–21)
BUN/CREAT SERPL: 10.1 (ref 7–25)
CALCIUM SPEC-SCNC: 8.5 MG/DL (ref 7.7–10)
CHLORIDE SERPL-SCNC: 106 MMOL/L (ref 99–112)
CO2 SERPL-SCNC: 21.4 MMOL/L (ref 24.3–31.9)
CREAT BLD-MCNC: 1.39 MG/DL (ref 0.43–1.29)
DEPRECATED RDW RBC AUTO: 46.8 FL (ref 37–54)
EOSINOPHIL # BLD AUTO: 0.09 10*3/MM3 (ref 0–0.7)
EOSINOPHIL NFR BLD AUTO: 1.3 % (ref 0–7)
ERYTHROCYTE [DISTWIDTH] IN BLOOD BY AUTOMATED COUNT: 14.3 % (ref 11.5–14.5)
GFR SERPL CREATININE-BSD FRML MDRD: 51 ML/MIN/1.73
GLUCOSE BLD-MCNC: 108 MG/DL (ref 70–110)
GLUCOSE BLDC GLUCOMTR-MCNC: 115 MG/DL (ref 70–130)
HCT VFR BLD AUTO: 33.1 % (ref 42–52)
HGB BLD-MCNC: 10.9 G/DL (ref 14–18)
IMM GRANULOCYTES # BLD: 0.04 10*3/MM3 (ref 0–0.03)
IMM GRANULOCYTES NFR BLD: 0.6 % (ref 0–0.5)
LYMPHOCYTES # BLD AUTO: 0.84 10*3/MM3 (ref 1–3)
LYMPHOCYTES NFR BLD AUTO: 12.4 % (ref 16–46)
MCH RBC QN AUTO: 30.4 PG (ref 27–33)
MCHC RBC AUTO-ENTMCNC: 32.9 G/DL (ref 33–37)
MCV RBC AUTO: 92.2 FL (ref 80–94)
MONOCYTES # BLD AUTO: 0.62 10*3/MM3 (ref 0.1–0.9)
MONOCYTES NFR BLD AUTO: 9.2 % (ref 0–12)
NEUTROPHILS # BLD AUTO: 5.17 10*3/MM3 (ref 1.4–6.5)
NEUTROPHILS NFR BLD AUTO: 76.4 % (ref 40–75)
OSMOLALITY SERPL CALC.SUM OF ELEC: 269.2 MOSM/KG (ref 273–305)
PLATELET # BLD AUTO: 159 10*3/MM3 (ref 130–400)
PMV BLD AUTO: 10.5 FL (ref 6–10)
POTASSIUM BLD-SCNC: 4.2 MMOL/L (ref 3.5–5.3)
RBC # BLD AUTO: 3.59 10*6/MM3 (ref 4.7–6.1)
SODIUM BLD-SCNC: 134 MMOL/L (ref 135–153)
WBC NRBC COR # BLD: 6.77 10*3/MM3 (ref 4.5–12.5)

## 2018-08-23 PROCEDURE — 87040 BLOOD CULTURE FOR BACTERIA: CPT | Performed by: INTERNAL MEDICINE

## 2018-08-23 PROCEDURE — 80048 BASIC METABOLIC PNL TOTAL CA: CPT | Performed by: SURGERY

## 2018-08-23 PROCEDURE — 94799 UNLISTED PULMONARY SVC/PX: CPT

## 2018-08-23 PROCEDURE — 25810000003 SODIUM CHLORIDE 0.9 % WITH KCL 20 MEQ 20-0.9 MEQ/L-% SOLUTION: Performed by: SURGERY

## 2018-08-23 PROCEDURE — 25010000002 HYDROMORPHONE PER 4 MG: Performed by: SURGERY

## 2018-08-23 PROCEDURE — 85025 COMPLETE CBC W/AUTO DIFF WBC: CPT | Performed by: SURGERY

## 2018-08-23 PROCEDURE — 25010000002 CEFEPIME: Performed by: INTERNAL MEDICINE

## 2018-08-23 PROCEDURE — 99231 SBSQ HOSP IP/OBS SF/LOW 25: CPT | Performed by: SURGERY

## 2018-08-23 PROCEDURE — 25010000002 PIPERACILLIN-TAZOBACTAM: Performed by: SURGERY

## 2018-08-23 PROCEDURE — 82962 GLUCOSE BLOOD TEST: CPT

## 2018-08-23 RX ORDER — MORPHINE SULFATE 15 MG/1
15 TABLET, FILM COATED, EXTENDED RELEASE ORAL EVERY 12 HOURS SCHEDULED
Status: DISCONTINUED | OUTPATIENT
Start: 2018-08-23 | End: 2018-08-23

## 2018-08-23 RX ORDER — OXYCODONE HCL 10 MG/1
10 TABLET, FILM COATED, EXTENDED RELEASE ORAL EVERY 12 HOURS SCHEDULED
Status: DISCONTINUED | OUTPATIENT
Start: 2018-08-23 | End: 2018-08-25 | Stop reason: HOSPADM

## 2018-08-23 RX ORDER — HYDROCODONE BITARTRATE AND ACETAMINOPHEN 5; 325 MG/1; MG/1
1 TABLET ORAL EVERY 6 HOURS PRN
Status: DISCONTINUED | OUTPATIENT
Start: 2018-08-23 | End: 2018-08-25 | Stop reason: HOSPADM

## 2018-08-23 RX ADMIN — OXYCODONE HYDROCHLORIDE 10 MG: 10 TABLET, FILM COATED, EXTENDED RELEASE ORAL at 12:35

## 2018-08-23 RX ADMIN — HYDROMORPHONE HYDROCHLORIDE 1 MG: 1 INJECTION, SOLUTION INTRAMUSCULAR; INTRAVENOUS; SUBCUTANEOUS at 02:15

## 2018-08-23 RX ADMIN — HYDROCODONE BITARTRATE AND ACETAMINOPHEN 1 TABLET: 5; 325 TABLET ORAL at 09:18

## 2018-08-23 RX ADMIN — COLCHICINE 0.6 MG: 0.6 TABLET, FILM COATED ORAL at 09:12

## 2018-08-23 RX ADMIN — PIOGLITAZONE 30 MG: 15 TABLET ORAL at 09:12

## 2018-08-23 RX ADMIN — OXYCODONE HYDROCHLORIDE 10 MG: 10 TABLET, FILM COATED, EXTENDED RELEASE ORAL at 20:26

## 2018-08-23 RX ADMIN — HYDROMORPHONE HYDROCHLORIDE 1 MG: 1 INJECTION, SOLUTION INTRAMUSCULAR; INTRAVENOUS; SUBCUTANEOUS at 10:24

## 2018-08-23 RX ADMIN — HYDROCODONE BITARTRATE AND ACETAMINOPHEN 1 TABLET: 5; 325 TABLET ORAL at 22:03

## 2018-08-23 RX ADMIN — CEFEPIME 2 G: 2 INJECTION, POWDER, FOR SOLUTION INTRAVENOUS at 10:42

## 2018-08-23 RX ADMIN — MONTELUKAST SODIUM 10 MG: 10 TABLET, COATED ORAL at 20:26

## 2018-08-23 RX ADMIN — METRONIDAZOLE 500 MG: 500 INJECTION, SOLUTION INTRAVENOUS at 14:14

## 2018-08-23 RX ADMIN — HYDROMORPHONE HYDROCHLORIDE 1 MG: 1 INJECTION, SOLUTION INTRAMUSCULAR; INTRAVENOUS; SUBCUTANEOUS at 05:41

## 2018-08-23 RX ADMIN — POTASSIUM CHLORIDE AND SODIUM CHLORIDE 100 ML/HR: 900; 150 INJECTION, SOLUTION INTRAVENOUS at 20:28

## 2018-08-23 RX ADMIN — LOSARTAN POTASSIUM 25 MG: 25 TABLET, FILM COATED ORAL at 09:12

## 2018-08-23 RX ADMIN — BUDESONIDE 0.5 MG: 0.5 SUSPENSION RESPIRATORY (INHALATION) at 20:30

## 2018-08-23 RX ADMIN — TAZOBACTAM SODIUM AND PIPERACILLIN SODIUM 4.5 G: .5; 4 INJECTION, POWDER, LYOPHILIZED, FOR SOLUTION INTRAVENOUS at 02:15

## 2018-08-23 RX ADMIN — METRONIDAZOLE 500 MG: 500 INJECTION, SOLUTION INTRAVENOUS at 22:03

## 2018-08-23 RX ADMIN — POTASSIUM CHLORIDE AND SODIUM CHLORIDE 100 ML/HR: 900; 150 INJECTION, SOLUTION INTRAVENOUS at 07:58

## 2018-08-23 RX ADMIN — FLECAINIDE ACETATE 50 MG: 50 TABLET ORAL at 20:26

## 2018-08-23 RX ADMIN — ALBUTEROL SULFATE 2.5 MG: 2.5 SOLUTION RESPIRATORY (INHALATION) at 18:57

## 2018-08-23 RX ADMIN — PANTOPRAZOLE SODIUM 40 MG: 40 TABLET, DELAYED RELEASE ORAL at 05:42

## 2018-08-23 RX ADMIN — CHOLECALCIFEROL TAB 10 MCG (400 UNIT) 1000 UNITS: 10 TAB at 09:12

## 2018-08-23 RX ADMIN — METOPROLOL TARTRATE 12.5 MG: 25 TABLET, FILM COATED ORAL at 09:12

## 2018-08-23 RX ADMIN — FLECAINIDE ACETATE 50 MG: 50 TABLET ORAL at 09:12

## 2018-08-23 RX ADMIN — FLUOXETINE HYDROCHLORIDE 20 MG: 20 CAPSULE ORAL at 20:26

## 2018-08-23 RX ADMIN — ALBUTEROL SULFATE 2.5 MG: 2.5 SOLUTION RESPIRATORY (INHALATION) at 06:47

## 2018-08-23 RX ADMIN — METOPROLOL TARTRATE 12.5 MG: 25 TABLET, FILM COATED ORAL at 20:26

## 2018-08-23 RX ADMIN — FLUOXETINE HYDROCHLORIDE 40 MG: 20 CAPSULE ORAL at 09:12

## 2018-08-23 RX ADMIN — BUDESONIDE 0.5 MG: 0.5 SUSPENSION RESPIRATORY (INHALATION) at 06:46

## 2018-08-23 NOTE — H&P
Patient Care Team:  Adiel Denney MD as PCP - General  Adiel Denney MD as PCP - Claims Attributed    Chief complaint abdominal and back pain    Subjective     68-year-old male recently admitted after cholecystectomy for was presented to the acute cholecystitis is found to have a benign gallbladder with distention and postoperatively developed elevated bilirubin which resulted in ERCP showing distal common bile duct stricture.  Cytology is negative but CA-19-9 is greater than 4000 concerning for possible pancreatic malignancy with extrinsic compression of the common bile duct.  Stent was placed at the time of ERCP.  He presents with back and abdominal pain that cannot be controlled on oral narcotics at home.  Bilirubin remains elevated at 2 but stent is in place.  Noncontrasted CT shows no new findings.  Blood cultures show Klebsiella pneumonia with elevated white blood cell count.      Abdominal Pain   Pertinent negatives include no constipation, diarrhea, dysuria, fever, headaches, nausea or vomiting.   Back Pain   Associated symptoms include abdominal pain. Pertinent negatives include no chest pain, dysuria, fever, headaches or weakness.       Review of Systems   Constitutional: Negative for activity change, appetite change, chills and fever.   HENT: Negative for sore throat and trouble swallowing.    Eyes: Negative for visual disturbance.   Respiratory: Negative for cough and shortness of breath.    Cardiovascular: Negative for chest pain and palpitations.   Gastrointestinal: Positive for abdominal pain. Negative for abdominal distention, blood in stool, constipation, diarrhea, nausea and vomiting.   Endocrine: Negative for cold intolerance and heat intolerance.   Genitourinary: Negative for dysuria.   Musculoskeletal: Positive for back pain. Negative for joint swelling.   Skin: Negative for color change, rash and wound.   Allergic/Immunologic: Negative for immunocompromised state.    Neurological: Negative for dizziness, seizures, weakness and headaches.   Hematological: Negative for adenopathy. Does not bruise/bleed easily.   Psychiatric/Behavioral: Negative for agitation and confusion.        Past Medical History:   Diagnosis Date   • Abnormal ECG    • Antral gastritis    • Atrial fibrillation (CMS/[facility])    • Chest pain    • COPD (chronic obstructive pulmonary disease) (CMS/[facility])    • Coronary artery disease    • Diabetes mellitus (CMS/[facility])     type 2    • Duodenitis    • Emphysema of lung (CMS/[facility])    • GERD (gastroesophageal reflux disease)    • Hyperlipidemia    • Hypertension    • Kidney stone    • Palpitations    • Reflux esophagitis    • Renal failure     stage 3 kidney disease     Past Surgical History:   Procedure Laterality Date   • CARDIAC CATHETERIZATION  11/01/1999   • CARDIOVASCULAR STRESS TEST  09/2012   • CHOLECYSTECTOMY N/A 8/10/2018    Procedure: CHOLECYSTECTOMY LAPAROSCOPIC;  Surgeon: Ronak Downs MD;  Location: [facility];  Service: General   • ECHO - CONVERTED  09/2012   • ERCP N/A 8/14/2018    Procedure: ENDOSCOPIC RETROGRADE CHOLANGIOPANCREATOGRAPHY WITH PAPILLOTOMY;  Surgeon: Scot Su MD;  Location: [facility];  Service: Gastroenterology   • KIDNEY STONE SURGERY     • UPPER GASTROINTESTINAL ENDOSCOPY  08/30/2012     Family History   Problem Relation Age of Onset   • Heart attack Mother    • Heart disease Mother    • Stroke Mother    • Kidney disease Mother    • Heart failure Mother    • Lung disease Father    • Tuberculosis Father    • Diabetes Sister    • Heart attack Brother    • Heart failure Brother    • Heart disease Brother    • Diabetes Sister    • No Known Problems Brother    • No Known Problems Brother      Social History   Substance Use Topics   • Smoking status: Never Smoker   • Smokeless tobacco: Never Used   • Alcohol use No     Prescriptions Prior to Admission   Medication Sig Dispense Refill Last Dose   • albuterol (PROVENTIL  HFA;VENTOLIN HFA) 108 (90 BASE) MCG/ACT inhaler Inhale 2 puffs Every 6 (Six) Hours As Needed. EVERY 4-6 HOURS    8/21/2018 at Unknown time   • apixaban (ELIQUIS) 5 MG tablet tablet Take 1 tablet by mouth Every 12 (Twelve) Hours. 60 tablet 0 8/21/2018 at Unknown time   • atorvastatin (LIPITOR) 10 MG tablet Take 5 mg by mouth daily.   8/21/2018 at Unknown time   • cholecalciferol (VITAMIN D3) 1000 units tablet Take 1,000 Units by mouth Daily.   8/21/2018 at Unknown time   • colchicine 0.6 MG tablet Take 1 tablet by mouth Daily. 90 tablet 2 8/21/2018 at Unknown time   • flecainide (TAMBOCOR) 50 MG tablet Take 1 tablet by mouth Every 12 (Twelve) Hours. 60 tablet 0 8/21/2018 at Unknown time   • FLUoxetine (PROzac) 20 MG capsule Take 40 mg by mouth Every Morning.   8/21/2018 at Unknown time   • FLUoxetine (PROzac) 20 MG capsule Take 20 mg by mouth Every Night.   8/20/2018 at Unknown time   • HYDROcodone-acetaminophen (NORCO) 5-325 MG per tablet Take 1 tablet by mouth Every 6 (Six) Hours As Needed for Moderate Pain  for up to 7 days. 20 tablet 0 8/21/2018 at Unknown time   • lansoprazole (PREVACID) 30 MG capsule Take 30 mg by mouth Daily.   8/21/2018 at Unknown time   • losartan (COZAAR) 50 MG tablet Take 0.5 tablets by mouth Daily. 90 tablet 1 8/21/2018 at Unknown time   • metoprolol tartrate (LOPRESSOR) 25 MG tablet Take 0.5 tablets by mouth Every 12 (Twelve) Hours.   8/21/2018 at Unknown time   • mometasone (ASMANEX) 220 MCG/INH inhaler Inhale 2 puffs Every Night.   8/20/2018 at Unknown time   • montelukast (SINGULAIR) 10 MG tablet Take 10 mg by mouth Every Night.   8/20/2018 at Unknown time   • ondansetron (ZOFRAN) 4 MG tablet Take 1 tablet by mouth Every 4 (Four) Hours As Needed for Nausea or Vomiting. 30 tablet 0 Past Week at Unknown time   • pioglitazone (ACTOS) 30 MG tablet Take 30 mg by mouth Daily.   8/21/2018 at Unknown time   • polyethylene glycol (MIRALAX) packet Take 17 g by mouth Daily As Needed.   Past Week  at Unknown time   • nitroglycerin (NITROSTAT) 0.4 MG SL tablet Place 0.4 mg under the tongue as needed for chest pain.   Unknown at Unknown time   • Tiotropium Bromide-Olodaterol 2.5-2.5 MCG/ACT aerosol solution Inhale 2 puffs Every Night.   8/20/2018     Allergies:  Contrast dye and Morphine and related    Objective      Vital Signs  Temp:  [98.8 °F (37.1 °C)-99.4 °F (37.4 °C)] 99.2 °F (37.3 °C)  Heart Rate:  [] 87  Resp:  [18-20] 18  BP: (102-153)/() 137/83    Physical Exam   Constitutional: He is oriented to person, place, and time. He appears well-developed.   HENT:   Head: Normocephalic and atraumatic.   Mouth/Throat: Mucous membranes are normal.   Eyes: Pupils are equal, round, and reactive to light. Conjunctivae are normal.   Neck: Neck supple. No JVD present. No tracheal deviation present. No thyromegaly present.   Cardiovascular: Normal rate and regular rhythm.  Exam reveals no gallop and no friction rub.    No murmur heard.  Pulmonary/Chest: Effort normal and breath sounds normal.   Abdominal: Soft. He exhibits no distension. There is no splenomegaly or hepatomegaly. There is no tenderness. No hernia.   Musculoskeletal: Normal range of motion. He exhibits no deformity.   Neurological: He is alert and oriented to person, place, and time.   Skin: Skin is warm and dry.   Psychiatric: He has a normal mood and affect.       Results Review:   I reviewed the patient's new clinical results.      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    He is also presenting signs of sepsis due to Klebsiella pneumonia on blood cultures).      Plan:   (CT of the abdomen and pelvis with IV contrast  Pain control  Nothing by mouth for now  Hold chronic anticoagulation  Broad-spectrum antibiotics).       I discussed the patients findings and my recommendations with patient and family    Ronak Downs MD  08/22/18  8:11 PM

## 2018-08-23 NOTE — PLAN OF CARE
Problem: Patient Care Overview  Goal: Plan of Care Review  Outcome: Ongoing (interventions implemented as appropriate)      Problem: Fall Risk (Adult)  Goal: Identify Related Risk Factors and Signs and Symptoms  Outcome: Ongoing (interventions implemented as appropriate)    Goal: Absence of Fall  Outcome: Ongoing (interventions implemented as appropriate)      Problem: Infection, Risk/Actual (Adult)  Goal: Identify Related Risk Factors and Signs and Symptoms  Outcome: Ongoing (interventions implemented as appropriate)    Goal: Infection Prevention/Resolution  Outcome: Ongoing (interventions implemented as appropriate)      Problem: Skin Injury Risk (Adult)  Goal: Identify Related Risk Factors and Signs and Symptoms  Outcome: Ongoing (interventions implemented as appropriate)

## 2018-08-23 NOTE — PLAN OF CARE
Problem: Patient Care Overview  Goal: Plan of Care Review  Outcome: Ongoing (interventions implemented as appropriate)   08/22/18 2105 08/23/18 0049   Plan of Care Review   Progress --  no change   Coping/Psychosocial   Plan of Care Reviewed With patient;spouse;family;son;daughter --      Goal: Individualization and Mutuality  Outcome: Ongoing (interventions implemented as appropriate)    Goal: Discharge Needs Assessment  Outcome: Ongoing (interventions implemented as appropriate)    Goal: Interprofessional Rounds/Family Conf  Outcome: Ongoing (interventions implemented as appropriate)      Problem: Fall Risk (Adult)  Goal: Identify Related Risk Factors and Signs and Symptoms  Outcome: Ongoing (interventions implemented as appropriate)   08/23/18 0049   Fall Risk (Adult)   Related Risk Factors (Fall Risk) age-related changes;fatigue/slow reaction;fear of falling;environment unfamiliar     Goal: Absence of Fall  Outcome: Ongoing (interventions implemented as appropriate)   08/23/18 1008   Fall Risk (Adult)   Absence of Fall making progress toward outcome       Problem: Infection, Risk/Actual (Adult)  Goal: Identify Related Risk Factors and Signs and Symptoms  Outcome: Ongoing (interventions implemented as appropriate)   08/23/18 0049   Infection, Risk/Actual (Adult)   Related Risk Factors (Infection, Risk/Actual) age extremes;chronic illness/condition;surgery/procedure   Signs and Symptoms (Infection, Risk/Actual) lab value changes;pain;cultures positive     Goal: Infection Prevention/Resolution  Outcome: Ongoing (interventions implemented as appropriate)   08/23/18 1008   Infection, Risk/Actual (Adult)   Infection Prevention/Resolution making progress toward outcome       Problem: Skin Injury Risk (Adult)  Goal: Identify Related Risk Factors and Signs and Symptoms  Outcome: Ongoing (interventions implemented as appropriate)   08/23/18 0049   Skin Injury Risk (Adult)   Related Risk Factors (Skin Injury Risk) advanced  age;infection;medication     Goal: Skin Health and Integrity  Outcome: Ongoing (interventions implemented as appropriate)   08/23/18 1008   Skin Injury Risk (Adult)   Skin Health and Integrity making progress toward outcome

## 2018-08-23 NOTE — PROGRESS NOTES
Discharge Planning Assessment   Jameel     Patient Name: Todd Phillips  MRN: [id number]  Today's Date: 8/23/2018    Admit Date: 8/21/2018          Discharge Needs Assessment     Row Name 08/23/18 1212       Living Environment    Lives With spouse    Name(s) of Who Lives With Patient Spouse, Emmy.    Current Living Arrangements home/apartment/condo    Primary Care Provided by spouse/significant other    Provides Primary Care For no one    Family Caregiver if Needed spouse    Quality of Family Relationships supportive    Able to Return to Prior Arrangements yes       Resource/Environmental Concerns    Resource/Environmental Concerns none    Transportation Concerns car, none   Family will provide discharge transportation.       Transition Planning    Patient/Family Anticipates Transition to home with family    Patient/Family Anticipated Services at Transition none    Transportation Anticipated family or friend will provide       Discharge Needs Assessment    Readmission Within the Last 30 Days other (see comments)   Amitted with ERCP with Papillotomy 8/13-8/16/18.    Concerns to be Addressed no discharge needs identified;denies needs/concerns at this time   States he has no needs at this time.    Equipment Currently Used at Home glucometer;bipap/cpap    Anticipated Changes Related to Illness none    Equipment Needed After Discharge none    Outpatient/Agency/Support Group Needs --   Currently denies needs for Home Health or outpt needs.            Discharge Plan     Row Name 08/23/18 1216       Plan    Plan Patient came to ED with abdominal pain, N/V, weakness and a reported Temp of 102.5. Dx: Sepsis and Bacteremia.  ID following for Positive Blood Cx. He is receiving IV ATB's and IV fluids. Pain meds for pain control.Today WBC 6.77. Cr 1.39. Temp 98.5. Advance diet today. Possible plan for outpt endoscopic US of CBD stricture at [facility] per patient. Dr. Denney is his PCP. He has Medicare [place], [place] and [employer] coverage. [facility]. States he has no co-pay issues and no home care needs at this time. CM will follow and assist as needed.    Patient/Family in Agreement with Plan yes        Destination     No service coordination in this encounter.      Durable Medical Equipment     No service coordination in this encounter.      Dialysis/Infusion     No service coordination in this encounter.      Home Medical Care     No service coordination in this encounter.      Social Care     No service coordination in this encounter.                Demographic Summary     Row Name 08/23/18 1207       General Information    Admission Type inpatient    Arrived From home    Referral Source admission list    Reason for Consult discharge planning    Preferred Language English     Used During This Interaction no            Functional Status     Row Name 08/23/18 1202       Functional Status    Usual Activity Tolerance --   Activity level decreased since he has been sick.    Current Activity Tolerance Moderate.       Functional Status, IADL    IADL Comments Spouse assisting as needed with ADL's and IADL's.       Mental Status    General Appearance WDL WDL       Mental Status Summary    Recent Changes in Mental Status/Cognitive Functioning no changes   Alert and oriented.            Psychosocial    No documentation.Alert and oriented. Pleasant.           Abuse/Neglect    No documentation.           Legal    No documentation.           Substance Abuse    No documentation.           Patient Forms    No documentation.         Emily Hernandez RN

## 2018-08-23 NOTE — CONSULTS
INFECTIOUS DISEASE CONSULTATION REPORT      Referring Provider: Dr. Downs  Reason for Consultation: BCID positive for Klebsiella pneumoniae - currently on Zosyn      Principal problem: <principal problem not specified>    Subjective .     History of present illness:    As you well know Dr. Downs, Mr. Todd Phillips is a 68 y.o. male with past medical history significant for COPD, Type 2 Diabetes, stage 3 kidney disease, who was recently admitted after a cholecystectomy and postoperatively developed elevated bilirubin requiring ERCP and biliary stent placement.  Found to have a pancreatic head mass with elevated CA-19-9 concerning for possible pancreatic malignancy although pathology was negative.  The patient reports he had an episode of weakness with nausea and vomiting yesterday.  Found to have 2 sets out of 2 positive blood cultures with BC ID showing possible Klebsiella/Enterobacter.  He reports fever at home up to 102.  On admission and lactic acid elevated at 2.2.    Infectious Disease consultation was requested for antimicrobial management.     History taken from: patient chart family RN    Case was discussed with patient, family and nursing staff    Subjective         Review of Systems     Constitutional: See HPI  Eyes: No eye drainage, itching or redness.  HEENT: No mouth sores, dysphagia or nose bleed.  Respiratory: No for shortness of breath, cough or production of sputum.  Cardiovascular: No chest pain, no palpitations, no orthopnea.  Gastrointestinal: See HPI  Genitourinary: No dysuria or polyuria.  Hematologic/lymphatic: No lymph node abnormalities, no easy bruising or easy bleeding.  Musculoskeletal: No muscle or joint pain.  Skin: No rash and no itching.  Neurological: No loss of consciousness, no seizure, no headache.  Behavioral/Psych: No depression or suicidal ideation.  Endocrine: No hot flashes.  Immunologic: Negative.    Past Medical History    Past Medical History:   Diagnosis Date   •  "Abnormal ECG    • Antral gastritis    • Atrial fibrillation (CMS/HCC)    • Chest pain    • COPD (chronic obstructive pulmonary disease) (CMS/HCC)    • Coronary artery disease    • Diabetes mellitus (CMS/HCC)     type 2    • Duodenitis    • Emphysema of lung (CMS/HCC)    • GERD (gastroesophageal reflux disease)    • Hyperlipidemia    • Hypertension    • Kidney stone    • Palpitations    • Reflux esophagitis    • Renal failure     stage 3 kidney disease       Past Surgical History    Past Surgical History:   Procedure Laterality Date   • CARDIAC CATHETERIZATION  11/01/1999   • CARDIOVASCULAR STRESS TEST  09/2012   • CHOLECYSTECTOMY N/A 8/10/2018    Procedure: CHOLECYSTECTOMY LAPAROSCOPIC;  Surgeon: Ronak Downs MD;  Location: [facility];  Service: General   • ECHO - CONVERTED  09/2012   • ERCP N/A 8/14/2018    Procedure: ENDOSCOPIC RETROGRADE CHOLANGIOPANCREATOGRAPHY WITH PAPILLOTOMY;  Surgeon: Scot Su MD;  Location: [facility];  Service: Gastroenterology   • KIDNEY STONE SURGERY     • UPPER GASTROINTESTINAL ENDOSCOPY  08/30/2012       Family History    Family History   Problem Relation Age of Onset   • Heart attack Mother    • Heart disease Mother    • Stroke Mother    • Kidney disease Mother    • Heart failure Mother    • Lung disease Father    • Tuberculosis Father    • Diabetes Sister    • Heart attack Brother    • Heart failure Brother    • Heart disease Brother    • Diabetes Sister    • No Known Problems Brother    • No Known Problems Brother      Social History    Social History   Substance Use Topics   • Smoking status: Never Smoker   • Smokeless tobacco: Never Used   • Alcohol use No       Allergies    Contrast dye and Morphine and related        Objective         Objective     /72   Pulse 89   Temp 98.5 °F (36.9 °C) (Oral)   Resp 18   Ht 180.3 cm (71\")   Wt 105 kg (231 lb 8 oz)   SpO2 95%   BMI 32.29 kg/m²     Temp:  [98.2 °F (36.8 °C)-99.4 °F (37.4 °C)] 98.5 °F (36.9 " °C)        Intake/Output Summary (Last 24 hours) at 08/23/18 1018  Last data filed at 08/22/18 1542   Gross per 24 hour   Intake              250 ml   Output                0 ml   Net              250 ml         Physical Exam:      General Appearance:    Alert, cooperative, in no acute distress, ill-appearing, shaky   Head:    Normocephalic, without obvious abnormality, atraumatic   Eyes:            Lids and lashes normal, conjunctivae and sclerae normal, no   icterus, no pallor, corneas clear, PERRLA   Ears:    Ears appear intact with no abnormalities noted   Throat:   No oral lesions, no thrush, oral mucosa moist   Neck:   No adenopathy, supple, trachea midline, no thyromegaly, no   carotid bruit, no JVD   Back:     No tenderness to percussion or palpation, range of motion   normal   Lungs:     Clear to auscultation,respirations regular, even and           unlabored. No wheezing, no ronchi and no crackles.    Heart:    Regular rhythm and normal rate, normal S1 and S2, no            murmur, no gallop, no rub, no click   Chest Wall:    No abnormalities observed   Abdomen:     Normal bowel sounds, no masses, no organomegaly, soft        non-tender, non-distended, no guarding, no rebound                tenderness   Rectal:     Deferred   Extremities:   Moves all extremities well, no edema, no cyanosis, no             redness   Pulses:   Pulses palpable and equal bilaterally   Skin:   No bleeding, bruising or rash   Lymph nodes:   No palpable adenopathy   Neurologic:   Awake, alert and oriented x 3. Following commands.       Results:      Results from last 7 days  Lab Units 08/23/18  0116 08/21/18  2306   WBC 10*3/mm3 6.77 11.04     Lab Results   Component Value Date    NEUTROABS 5.17 08/23/2018         Results from last 7 days  Lab Units 08/23/18  0116   CREATININE mg/dL 1.39*         Results from last 7 days  Lab Units 08/22/18  0234   CRP mg/dL 1.99*       Imaging Results (last 24 hours)     Procedure Component  Value Units Date/Time    CT Abdomen Pelvis With Contrast [[id number]] Collected:  08/23/18 0725     Updated:  08/23/18 0725    Narrative:       CT ABDOMEN PELVIS WITH CONTRAST-     CLINICAL INDICATION: Neoplasm: pancreas, suspected; A41.9-Sepsis,  unspecified organism; J18.9-Pneumonia, unspecified organism; R91.8-Other  nonspecific abnormal finding of lung field.          COMPARISON: None available.     TECHNIQUE: Axial images were acquired from the lung bases through the  pubic symphysis after IV and oral contrast.  Reformatted images were created in both the coronal and sagittal planes.     Radiation dose reduction techniques were utilized per ALARA protocol.  Automated exposure control was initiated through either or [employer] or  DoseRight software packages by  protocol.        FINDINGS:   Tiny left basilar nodule measuring 4 mm on image 14 of the axial series.  Minimal bibasilar atelectasis.     The liver is homogeneous. There is no evidence of focal hepatic mass     The spleen is homogeneous     Indistinct appearance of the pancreatic head. A biliary stent is  present.     Small left adrenal nodule is present.     The kidneys show no evidence of hydronephrosis or hydroureter. I do not  see any distal ureteral stones.      Otherwise I do not see any free fluid or walled off fluid collections.     Large volume stool is present in the colon.               Impression:       1. Slightly enlarged, indistinct appearance of the pancreatic head.  Underlying neoplasm certainly not excluded but no delineable mass is  present. There is a biliary stent.  2. Tiny nodule in the left lung base.  3. Small left adrenal nodule.  4. Hiatal hernia.  5. Moderate to large volume stool in the colon.                           CT Abdomen Pelvis Stone Protocol [[id number]] Collected:  08/22/18 0731     Updated:  08/22/18 1313    Narrative:       CT ABDOMEN PELVIS STONE PROTOCOL-     CLINICAL INDICATION: Abdominal pain,  unspecified.          COMPARISON: None available.     TECHNIQUE: Axial images were acquired from the lung bases through the  pubic symphysis without any IV or oral contrast.  Reformatted images were created in both the coronal and sagittal planes.     Radiation dose reduction techniques were utilized per ALARA protocol.  Automated exposure control was initiated through either or [employer] or  DoseRight software packages by  protocol.           FINDINGS:   Minimal bibasilar atelectasis.  Hiatal hernia.     The liver is homogeneous. There is no evidence of focal hepatic mass.     The spleen is homogeneous.     Stent is noted traversing the common bile duct.     3 mm nodule in the right lung on image 8 of the axial series.     There is no adrenal enlargement.     The kidneys show no evidence of hydronephrosis or hydroureter. I do not  see any distal ureteral stones.      Otherwise I do not see any free fluid or walled off fluid collections.     There is moderate to large volume stool in the colon.     There is no evidence of mesenteric or retroperitoneal adenopathy.               Impression:       1. Tiny nodule in the right lung base.  2. Minimal bibasilar atelectasis.  3. Moderate to large volume stool in the colon.     Other findings as above.                This report was finalized on 8/22/2018 1:11 PM by Dr. Ernesto Bell MD.               Cultures:    Blood Culture   Date Value Ref Range Status   08/21/2018 (A)  Preliminary    Gram Negative Bacilli, Morphology consistent with Klebsiela / Enterobacter   08/21/2018 (A)  Preliminary    Gram Negative Bacilli, Morphology consistent with Klebsiela / Enterobacter       Results Review:    I have personally reviewed laboratory data, culture results, radiology studies and antimicrobial therapy.    Hospital Medications (active)       Dose Frequency Start End    albuterol (PROVENTIL) nebulizer solution 0.083% 2.5 mg/3mL 2.5 mg Every 6 Hours PRN 8/22/2018     Sig  - Route: Take 2.5 mg by nebulization Every 6 (Six) Hours As Needed for Wheezing or Shortness of Air. - Nebulization    budesonide (PULMICORT) nebulizer solution 0.5 mg 0.5 mg 2 Times Daily - RT 8/22/2018     Sig - Route: Take 2 mL by nebulization 2 (Two) Times a Day. - Nebulization    cefepime (MAXIPIME) 2 g/100 mL 0.9% NS (mbp) 2 g Once 8/23/2018     Sig - Route: Infuse 100 mL into a venous catheter 1 (One) Time. - Intravenous    cefepime (MAXIPIME) 2 g/100 mL 0.9% NS (mbp) 2 g Every 12 Hours 8/23/2018 9/2/2018    Sig - Route: Infuse 100 mL into a venous catheter Every 12 (Twelve) Hours. - Intravenous    cholecalciferol (VITAMIN D3) tablet 1,000 Units 1,000 Units Daily 8/22/2018     Sig - Route: Take 2.5 tablets by mouth Daily. - Oral    colchicine tablet 0.6 mg 0.6 mg Daily 8/22/2018     Sig - Route: Take 1 tablet by mouth Daily. - Oral    flecainide (TAMBOCOR) tablet 50 mg 50 mg Every 12 Hours Scheduled 8/22/2018     Sig - Route: Take 1 tablet by mouth Every 12 (Twelve) Hours. - Oral    FLUoxetine (PROzac) capsule 20 mg 20 mg Nightly 8/22/2018     Sig - Route: Take 1 capsule by mouth Every Night. - Oral    FLUoxetine (PROzac) capsule 40 mg 40 mg Every Morning 8/23/2018     Sig - Route: Take 2 capsules by mouth Every Morning. - Oral    HYDROcodone-acetaminophen (NORCO) 5-325 MG per tablet 1 tablet 1 tablet Every 4 Hours PRN 8/22/2018 9/1/2018    Sig - Route: Take 1 tablet by mouth Every 4 (Four) Hours As Needed for Moderate Pain . - Oral    HYDROmorphone (DILAUDID) injection 0.5 mg 0.5 mg Once 8/22/2018 8/22/2018    Sig - Route: Infuse 0.5 mL into a venous catheter 1 (One) Time. - Intravenous    HYDROmorphone (DILAUDID) injection 1 mg 1 mg Every 2 Hours PRN 8/22/2018 9/1/2018    Sig - Route: Infuse 1 mL into a venous catheter Every 2 (Two) Hours As Needed for Severe Pain . - Intravenous    iopamidol (ISOVUE-300) 61 % injection 100 mL 100 mL Once in Imaging 8/22/2018 8/22/2018    Sig - Route: Infuse 100 mL into a  "venous catheter Once. - Intravenous    losartan (COZAAR) tablet 25 mg 25 mg Daily 8/22/2018     Sig - Route: Take 1 tablet by mouth Daily. - Oral    metoprolol tartrate (LOPRESSOR) tablet 12.5 mg 12.5 mg Every 12 Hours Scheduled 8/22/2018     Sig - Route: Take 0.5 tablets by mouth Every 12 (Twelve) Hours. - Oral    metroNIDAZOLE (FLAGYL) IVPB 500 mg 500 mg Every 8 Hours 8/23/2018 9/2/2018    Sig - Route: Infuse 100 mL into a venous catheter Every 8 (Eight) Hours. - Intravenous    montelukast (SINGULAIR) tablet 10 mg 10 mg Nightly 8/22/2018     Sig - Route: Take 1 tablet by mouth Every Night. - Oral    naloxone (NARCAN) injection 0.4 mg 0.4 mg Every 5 Minutes PRN 8/22/2018     Sig - Route: Infuse 1 mL into a venous catheter Every 5 (Five) Minutes As Needed for Respiratory Depression. - Intravenous    Linked Group 1:  \"And\" Linked Group Details        nitroglycerin (NITROSTAT) SL tablet 0.4 mg 0.4 mg As Needed 8/22/2018     Sig - Route: Place 1 tablet under the tongue As Needed for Chest Pain. - Sublingual    ondansetron (ZOFRAN) injection 4 mg 4 mg Once 8/22/2018 8/22/2018    Sig - Route: Infuse 2 mL into a venous catheter 1 (One) Time. - Intravenous    Cosign for Ordering: Required by Tyree Carballo MD    ondansetron (ZOFRAN) tablet 4 mg 4 mg Every 4 Hours PRN 8/22/2018     Sig - Route: Take 1 tablet by mouth Every 4 (Four) Hours As Needed for Nausea or Vomiting. - Oral    pantoprazole (PROTONIX) EC tablet 40 mg 40 mg Every Morning 8/23/2018     Sig - Route: Take 1 tablet by mouth Every Morning. - Oral    pioglitazone (ACTOS) tablet 30 mg 30 mg Daily 8/22/2018     Sig - Route: Take 2 tablets by mouth Daily. - Oral    piperacillin-tazobactam (ZOSYN) 4.5 g/100 mL 0.9% NS IVPB (mbp) 4.5 g Once 8/22/2018 8/22/2018    Sig - Route: Infuse 100 mL into a venous catheter 1 (One) Time. - Intravenous    Cosign for Ordering: Required by Tyree Carballo MD    polyethylene glycol (MIRALAX) powder 17 g 17 g Daily " "PRN 8/22/2018     Sig - Route: Take 17 g by mouth Daily As Needed for Constipation. - Oral    sodium chloride 0.9 % bolus 2,000 mL 2,000 mL Once 8/22/2018 8/23/2018    Sig - Route: Infuse 2,000 mL into a venous catheter 1 (One) Time. - Intravenous    sodium chloride 0.9 % flush 1-10 mL 1-10 mL As Needed 8/22/2018     Sig - Route: Infuse 1-10 mL into a venous catheter As Needed for Line Care. - Intravenous    sodium chloride 0.9 % flush 10 mL 10 mL As Needed 8/21/2018     Sig - Route: Infuse 10 mL into a venous catheter As Needed for Line Care. - Intravenous    Cosign for Ordering: Accepted by Tyree Carballo MD on 8/22/2018  2:42 AM    Linked Group 2:  \"And\" Linked Group Details        sodium chloride 0.9 % with KCl 20 mEq/L infusion 100 mL/hr Continuous 8/22/2018     Sig - Route: Infuse 100 mL/hr into a venous catheter Continuous. - Intravenous    tiotropium bromide-olodaterol (STIOLTO RESPIMAT) 2.5-2.5 MCG/ACT inhaler 2 puff 2 puff Nightly 8/22/2018     Sig - Route: Inhale 2 puffs Every Night. - Inhalation    Non-formulary Exception Code: Patient supplied medication    vancomycin (VANCOCIN) 1,000 mg in sodium chloride 0.9 % 250 mL IVPB 1,000 mg Once 8/22/2018 8/22/2018    Sig - Route: Infuse 1,000 mg into a venous catheter 1 (One) Time. - Intravenous    Cosign for Ordering: Required by Tyree Carballo MD    morphine injection 2 mg (Discontinued) 2 mg Every 4 Hours PRN 8/22/2018 8/22/2018    Sig - Route: Infuse 0.5 mL into a venous catheter Every 4 (Four) Hours As Needed for Moderate Pain . - Intravenous    Linked Group 1:  \"And\" Linked Group Details        piperacillin-tazobactam (ZOSYN) 4.5 g/100 mL 0.9% NS IVPB (mbp) (Discontinued) 4.5 g Every 8 Hours 8/22/2018 8/23/2018    Sig - Route: Infuse 100 mL into a venous catheter Every 8 (Eight) Hours. - Intravenous    vancomycin (VANCOCIN) 1,000 mg in sodium chloride 0.9 % 250 mL IVPB (Discontinued) 1,000 mg Once 8/22/2018 8/22/2018    Sig - Route: " Infuse 1,000 mg into a venous catheter 1 (One) Time. - Intravenous    Reason for Discontinue: Duplicate order            ASSESSMENT/PLAN           Assessment/Plan     ASSESSMENT:    1.  Severe sepsis and lactic acid greater than 2 on admission  2.  Bacteremia    PLAN:    We are concerned about bacteremia post cholecystectomy and in the setting of biliary stent and abnormal LFTs acute cholangitis is possible but unlikely.  Based on culture results antibiotic therapy was changed to cefepime 2 g IV every 12 hours and Flagyl 500 mg IV every 8 hours with hope to de-escalate coverage when susceptibilities are available.  Zosyn was discontinued for now and blood cultures repeated ×2 sets peripherally today to document clearing of bacteremia      Patient's findings and recommendations were discussed with patient, family and nursing staff    Code Status:   Code Status and Medical Interventions:   Ordered at: 08/22/18 1525     Level Of Support Discussed With:    Patient     Code Status:    CPR     Medical Interventions (Level of Support Prior to Arrest):    Full       Tamera Schwab PA-C  08/23/18  10:18 AM

## 2018-08-24 ENCOUNTER — DOCUMENTATION (OUTPATIENT)
Dept: SURGERY | Facility: CLINIC | Age: 68
End: 2018-08-24

## 2018-08-24 LAB
ANION GAP SERPL CALCULATED.3IONS-SCNC: 6.5 MMOL/L (ref 3.6–11.2)
BACTERIA SPEC AEROBE CULT: ABNORMAL
BACTERIA SPEC AEROBE CULT: ABNORMAL
BASOPHILS # BLD AUTO: 0.01 10*3/MM3 (ref 0–0.3)
BASOPHILS NFR BLD AUTO: 0.3 % (ref 0–2)
BUN BLD-MCNC: 16 MG/DL (ref 7–21)
BUN/CREAT SERPL: 10.7 (ref 7–25)
CALCIUM SPEC-SCNC: 8.8 MG/DL (ref 7.7–10)
CHLORIDE SERPL-SCNC: 107 MMOL/L (ref 99–112)
CK MB SERPL-CCNC: 1.76 NG/ML (ref 0–5)
CK MB SERPL-RTO: 1.6 % (ref 0–3)
CK SERPL-CCNC: 107 U/L (ref 24–204)
CO2 SERPL-SCNC: 22.5 MMOL/L (ref 24.3–31.9)
CREAT BLD-MCNC: 1.5 MG/DL (ref 0.43–1.29)
DEPRECATED RDW RBC AUTO: 46.6 FL (ref 37–54)
EOSINOPHIL # BLD AUTO: 0.12 10*3/MM3 (ref 0–0.7)
EOSINOPHIL NFR BLD AUTO: 3 % (ref 0–7)
ERYTHROCYTE [DISTWIDTH] IN BLOOD BY AUTOMATED COUNT: 14.2 % (ref 11.5–14.5)
GFR SERPL CREATININE-BSD FRML MDRD: 46 ML/MIN/1.73
GLUCOSE BLD-MCNC: 159 MG/DL (ref 70–110)
GRAM STN SPEC: ABNORMAL
GRAM STN SPEC: ABNORMAL
HCT VFR BLD AUTO: 35.4 % (ref 42–52)
HGB BLD-MCNC: 11.4 G/DL (ref 14–18)
IMM GRANULOCYTES # BLD: 0.03 10*3/MM3 (ref 0–0.03)
IMM GRANULOCYTES NFR BLD: 0.8 % (ref 0–0.5)
ISOLATED FROM: ABNORMAL
ISOLATED FROM: ABNORMAL
LYMPHOCYTES # BLD AUTO: 0.98 10*3/MM3 (ref 1–3)
LYMPHOCYTES NFR BLD AUTO: 24.6 % (ref 16–46)
MCH RBC QN AUTO: 29.7 PG (ref 27–33)
MCHC RBC AUTO-ENTMCNC: 32.2 G/DL (ref 33–37)
MCV RBC AUTO: 92.2 FL (ref 80–94)
MONOCYTES # BLD AUTO: 0.46 10*3/MM3 (ref 0.1–0.9)
MONOCYTES NFR BLD AUTO: 11.6 % (ref 0–12)
NEUTROPHILS # BLD AUTO: 2.38 10*3/MM3 (ref 1.4–6.5)
NEUTROPHILS NFR BLD AUTO: 59.7 % (ref 40–75)
OSMOLALITY SERPL CALC.SUM OF ELEC: 276.5 MOSM/KG (ref 273–305)
PLATELET # BLD AUTO: 168 10*3/MM3 (ref 130–400)
PMV BLD AUTO: 10.8 FL (ref 6–10)
POTASSIUM BLD-SCNC: 4 MMOL/L (ref 3.5–5.3)
RBC # BLD AUTO: 3.84 10*6/MM3 (ref 4.7–6.1)
SODIUM BLD-SCNC: 136 MMOL/L (ref 135–153)
TROPONIN I SERPL-MCNC: 0.02 NG/ML
TROPONIN I SERPL-MCNC: 0.03 NG/ML
TROPONIN I SERPL-MCNC: 0.03 NG/ML
WBC NRBC COR # BLD: 3.98 10*3/MM3 (ref 4.5–12.5)

## 2018-08-24 PROCEDURE — 80048 BASIC METABOLIC PNL TOTAL CA: CPT | Performed by: SURGERY

## 2018-08-24 PROCEDURE — 99222 1ST HOSP IP/OBS MODERATE 55: CPT | Performed by: INTERNAL MEDICINE

## 2018-08-24 PROCEDURE — 25010000002 CEFEPIME: Performed by: INTERNAL MEDICINE

## 2018-08-24 PROCEDURE — 84484 ASSAY OF TROPONIN QUANT: CPT | Performed by: SURGERY

## 2018-08-24 PROCEDURE — 99231 SBSQ HOSP IP/OBS SF/LOW 25: CPT | Performed by: SURGERY

## 2018-08-24 PROCEDURE — 82550 ASSAY OF CK (CPK): CPT | Performed by: SURGERY

## 2018-08-24 PROCEDURE — 93010 ELECTROCARDIOGRAM REPORT: CPT | Performed by: INTERNAL MEDICINE

## 2018-08-24 PROCEDURE — 94799 UNLISTED PULMONARY SVC/PX: CPT

## 2018-08-24 PROCEDURE — 82553 CREATINE MB FRACTION: CPT | Performed by: SURGERY

## 2018-08-24 PROCEDURE — 25810000003 SODIUM CHLORIDE 0.9 % WITH KCL 20 MEQ 20-0.9 MEQ/L-% SOLUTION: Performed by: SURGERY

## 2018-08-24 PROCEDURE — 93005 ELECTROCARDIOGRAM TRACING: CPT | Performed by: SURGERY

## 2018-08-24 PROCEDURE — 85025 COMPLETE CBC W/AUTO DIFF WBC: CPT | Performed by: SURGERY

## 2018-08-24 RX ORDER — CEFDINIR 300 MG/1
300 CAPSULE ORAL EVERY 12 HOURS SCHEDULED
Status: DISCONTINUED | OUTPATIENT
Start: 2018-08-24 | End: 2018-08-25 | Stop reason: HOSPADM

## 2018-08-24 RX ORDER — L.ACID,PARA/B.BIFIDUM/S.THERM 8B CELL
1 CAPSULE ORAL DAILY
Status: DISCONTINUED | OUTPATIENT
Start: 2018-08-24 | End: 2018-08-25 | Stop reason: HOSPADM

## 2018-08-24 RX ADMIN — CEFEPIME 2 G: 2 INJECTION, POWDER, FOR SOLUTION INTRAVENOUS at 10:11

## 2018-08-24 RX ADMIN — METOPROLOL TARTRATE 12.5 MG: 25 TABLET, FILM COATED ORAL at 09:09

## 2018-08-24 RX ADMIN — NITROGLYCERIN 0.4 MG: 0.4 TABLET SUBLINGUAL at 09:13

## 2018-08-24 RX ADMIN — FLUOXETINE HYDROCHLORIDE 40 MG: 20 CAPSULE ORAL at 09:09

## 2018-08-24 RX ADMIN — APIXABAN 5 MG: 5 TABLET, FILM COATED ORAL at 13:29

## 2018-08-24 RX ADMIN — LOSARTAN POTASSIUM 25 MG: 25 TABLET, FILM COATED ORAL at 09:09

## 2018-08-24 RX ADMIN — CEFEPIME 2 G: 2 INJECTION, POWDER, FOR SOLUTION INTRAVENOUS at 00:00

## 2018-08-24 RX ADMIN — CEFDINIR 300 MG: 300 CAPSULE ORAL at 19:34

## 2018-08-24 RX ADMIN — NITROGLYCERIN 0.4 MG: 0.4 TABLET SUBLINGUAL at 09:18

## 2018-08-24 RX ADMIN — ALBUTEROL SULFATE 2.5 MG: 2.5 SOLUTION RESPIRATORY (INHALATION) at 23:27

## 2018-08-24 RX ADMIN — MONTELUKAST SODIUM 10 MG: 10 TABLET, COATED ORAL at 19:35

## 2018-08-24 RX ADMIN — PANTOPRAZOLE SODIUM 40 MG: 40 TABLET, DELAYED RELEASE ORAL at 04:57

## 2018-08-24 RX ADMIN — ALBUTEROL SULFATE 2.5 MG: 2.5 SOLUTION RESPIRATORY (INHALATION) at 06:44

## 2018-08-24 RX ADMIN — ALBUTEROL SULFATE 2.5 MG: 2.5 SOLUTION RESPIRATORY (INHALATION) at 18:08

## 2018-08-24 RX ADMIN — COLCHICINE 0.6 MG: 0.6 TABLET, FILM COATED ORAL at 09:09

## 2018-08-24 RX ADMIN — APIXABAN 5 MG: 5 TABLET, FILM COATED ORAL at 19:34

## 2018-08-24 RX ADMIN — CHOLECALCIFEROL TAB 10 MCG (400 UNIT) 1000 UNITS: 10 TAB at 09:09

## 2018-08-24 RX ADMIN — CEFDINIR 300 MG: 300 CAPSULE ORAL at 13:29

## 2018-08-24 RX ADMIN — FLECAINIDE ACETATE 50 MG: 50 TABLET ORAL at 19:35

## 2018-08-24 RX ADMIN — PIOGLITAZONE 30 MG: 15 TABLET ORAL at 09:09

## 2018-08-24 RX ADMIN — POTASSIUM CHLORIDE AND SODIUM CHLORIDE 100 ML/HR: 900; 150 INJECTION, SOLUTION INTRAVENOUS at 09:21

## 2018-08-24 RX ADMIN — OXYCODONE HYDROCHLORIDE 10 MG: 10 TABLET, FILM COATED, EXTENDED RELEASE ORAL at 20:09

## 2018-08-24 RX ADMIN — METOPROLOL TARTRATE 12.5 MG: 25 TABLET, FILM COATED ORAL at 19:35

## 2018-08-24 RX ADMIN — FLECAINIDE ACETATE 50 MG: 50 TABLET ORAL at 09:09

## 2018-08-24 RX ADMIN — BUDESONIDE 0.5 MG: 0.5 SUSPENSION RESPIRATORY (INHALATION) at 18:18

## 2018-08-24 RX ADMIN — OXYCODONE HYDROCHLORIDE 10 MG: 10 TABLET, FILM COATED, EXTENDED RELEASE ORAL at 09:10

## 2018-08-24 RX ADMIN — Medication 1 CAPSULE: at 19:07

## 2018-08-24 RX ADMIN — BUDESONIDE 0.5 MG: 0.5 SUSPENSION RESPIRATORY (INHALATION) at 06:44

## 2018-08-24 RX ADMIN — HYDROCODONE BITARTRATE AND ACETAMINOPHEN 1 TABLET: 5; 325 TABLET ORAL at 04:55

## 2018-08-24 RX ADMIN — METRONIDAZOLE 500 MG: 500 INJECTION, SOLUTION INTRAVENOUS at 19:34

## 2018-08-24 RX ADMIN — HYDROCODONE BITARTRATE AND ACETAMINOPHEN 1 TABLET: 5; 325 TABLET ORAL at 10:10

## 2018-08-24 RX ADMIN — METRONIDAZOLE 500 MG: 500 INJECTION, SOLUTION INTRAVENOUS at 13:30

## 2018-08-24 RX ADMIN — POTASSIUM CHLORIDE AND SODIUM CHLORIDE 100 ML/HR: 900; 150 INJECTION, SOLUTION INTRAVENOUS at 19:33

## 2018-08-24 RX ADMIN — FLUOXETINE HYDROCHLORIDE 20 MG: 20 CAPSULE ORAL at 19:35

## 2018-08-24 RX ADMIN — METRONIDAZOLE 500 MG: 500 INJECTION, SOLUTION INTRAVENOUS at 04:57

## 2018-08-24 RX ADMIN — NITROGLYCERIN 0.4 MG: 0.4 TABLET SUBLINGUAL at 09:08

## 2018-08-24 NOTE — NURSING NOTE
Patient reports that his chest pain has increased from 4 to 8 at this time. Called Dr. Downs with EKG results and an update. Nitroglycerin 0.4mg SL was given x3 with no relief. Telemetry called and states that patient has converted to afib 120-170's. Per Dr. Downs order cardiac enzymes. Continue to monitor. Call Cardiology back and have them come and assess the patient as soon as possible.

## 2018-08-24 NOTE — NURSING NOTE
"Patient reports that he is having chest pain on the left anterior chest. He rates the pain 4 and states that it is \"stinging\" and that his heart is \"racing\". He states that it \"feels like I am going into a-fib again, this is how I felt the last time.\" Vitals obtained. No shortness of breath at this time. 97% O2 on room air. Called Dr. Downs called and STAT EKG ordered, Cardiology consult ordered and telemetry monitoring ordered.   "

## 2018-08-24 NOTE — CONSULTS
Patient Name: Todd Phillips  Age/Sex: 68 y.o. male  : 1950  MRN: [id number]    Date of Hospital Visit: 2018  Encounter Provider: Rukhsana Perez MD  Referring Provider: No ref. provider found         Subjective:       Chief Complaint: Paroxysmal atrial fibrillation    History of Present Illness:  Todd Phillips is a 68 y.o. male history of paroxysmal atrial fibrillation, last episode about 10 months ago.  The patient is status post recent cholecystectomy and has a suspected pancreatic mass.  He is status post stenting of the common biliary duct.  He was admitted 2 days ago on the surgical service for abdominal pain.  On admission he was in normal sinus rhythm, his EKG showed nonspecific ST changes.  This morning the patient complaining of chest pain and was found to have atrial fibrillation with rapid ventricular response and average heart rate of 150 bpm.  He complains of chest pain which was not relieved by nitroglycerin.    Patient is on chronic anticoagulation with Eliquis, whichwas held on admission to the hospital 2 days ago.  He is on flecainide 50 mg twice a day to maintain sinus rhythm.    The patient has diabetes and hypertension, he has had a cardiac catheterization over 10 years ago showing small vessel disease.  A stress test performed 10 months ago in [place] was reportedly negative.      Past Medical History:  Past Medical History:   Diagnosis Date   • Abnormal ECG    • Antral gastritis    • Atrial fibrillation (CMS/HCC)    • Chest pain    • COPD (chronic obstructive pulmonary disease) (CMS/HCC)    • Coronary artery disease    • Diabetes mellitus (CMS/HCC)     type 2    • Duodenitis    • Emphysema of lung (CMS/HCC)    • GERD (gastroesophageal reflux disease)    • Hyperlipidemia    • Hypertension    • Kidney stone    • Palpitations    • Reflux esophagitis    • Renal failure     stage 3 kidney disease       Past Surgical History:   Procedure Laterality Date   • CARDIAC CATHETERIZATION   1999   • CARDIOVASCULAR STRESS TEST  2012   • CHOLECYSTECTOMY N/A 8/10/2018    Procedure: CHOLECYSTECTOMY LAPAROSCOPIC;  Surgeon: Ronak Downs MD;  [facility];  Service: General   • ECHO - CONVERTED  2012   • ERCP N/A 2018    Procedure: ENDOSCOPIC RETROGRADE CHOLANGIOPANCREATOGRAPHY WITH PAPILLOTOMY;  Surgeon: Scot Su MD;  Location: [facility] OR;  Service: Gastroenterology   • KIDNEY STONE SURGERY     • UPPER GASTROINTESTINAL ENDOSCOPY  2012       Home Medications:    Prescriptions Prior to Admission   Medication Sig Dispense Refill Last Dose   • albuterol (PROVENTIL HFA;VENTOLIN HFA) 108 (90 BASE) MCG/ACT inhaler Inhale 2 puffs Every 6 (Six) Hours As Needed. EVERY 4-6 HOURS    2018 at Unknown time   • apixaban (ELIQUIS) 5 MG tablet tablet Take 1 tablet by mouth Every 12 (Twelve) Hours. 60 tablet 0 2018 at Unknown time   • atorvastatin (LIPITOR) 10 MG tablet Take 5 mg by mouth daily.   2018 at Unknown time   • cholecalciferol (VITAMIN D3) 1000 units tablet Take 1,000 Units by mouth Daily.   2018 at Unknown time   • colchicine 0.6 MG tablet Take 1 tablet by mouth Daily. 90 tablet 2 2018 at Unknown time   • flecainide (TAMBOCOR) 50 MG tablet Take 1 tablet by mouth Every 12 (Twelve) Hours. 60 tablet 0 2018 at Unknown time   • FLUoxetine (PROzac) 20 MG capsule Take 40 mg by mouth Every Morning.   2018 at Unknown time   • FLUoxetine (PROzac) 20 MG capsule Take 20 mg by mouth Every Night.   2018 at Unknown time   • [] HYDROcodone-acetaminophen (NORCO) 5-325 MG per tablet Take 1 tablet by mouth Every 6 (Six) Hours As Needed for Moderate Pain  for up to 7 days. 20 tablet 0 2018 at Unknown time   • lansoprazole (PREVACID) 30 MG capsule Take 30 mg by mouth Daily.   2018 at Unknown time   • losartan (COZAAR) 50 MG tablet Take 0.5 tablets by mouth Daily. 90 tablet 1 2018 at Unknown time   • metoprolol tartrate  (LOPRESSOR) 25 MG tablet Take 0.5 tablets by mouth Every 12 (Twelve) Hours.   8/21/2018 at Unknown time   • mometasone (ASMANEX) 220 MCG/INH inhaler Inhale 2 puffs Every Night.   8/20/2018 at Unknown time   • montelukast (SINGULAIR) 10 MG tablet Take 10 mg by mouth Every Night.   8/20/2018 at Unknown time   • ondansetron (ZOFRAN) 4 MG tablet Take 1 tablet by mouth Every 4 (Four) Hours As Needed for Nausea or Vomiting. 30 tablet 0 Past Week at Unknown time   • pioglitazone (ACTOS) 30 MG tablet Take 30 mg by mouth Daily.   8/21/2018 at Unknown time   • polyethylene glycol (MIRALAX) packet Take 17 g by mouth Daily As Needed.   Past Week at Unknown time   • nitroglycerin (NITROSTAT) 0.4 MG SL tablet Place 0.4 mg under the tongue as needed for chest pain.   Unknown at Unknown time   • Tiotropium Bromide-Olodaterol 2.5-2.5 MCG/ACT aerosol solution Inhale 2 puffs Every Night.   8/20/2018       Allergies:  Allergies   Allergen Reactions   • Contrast Dye Other (See Comments)     Can't have due to kidney failure per family   • Morphine And Related Palpitations     Morphine caused pt to isaac - several years ago         Past Social History:  Social History     Social History   • Marital status:      Social History Main Topics   • Smoking status: Never Smoker   • Smokeless tobacco: Never Used   • Alcohol use No   • Drug use: No   • Sexual activity: Defer     Other Topics Concern   • Not on file       Past Family History:  His mother had stroke and hypertension.    Review of Systems:   Constitution: No chills, no rigors, no unexplained weight loss or weight gain  Eyes:  No diplopia, no blurred vision, no loss of vision, conjunctiva is pink and sclera is anicteric  ENT:  No tinnitus, no otorrhea, no epistaxis, no sore throat   Respiratory: No cough, no hemoptysis  Cardiovascular: Infrequent episodes of palpitation, no recent episodes of angina.  Gastrointestinal: See history of present illness  Genitourinary: No frequency  of dysuria no hematuria  Integument: positive for  No pruritis and  no skin rash  Hematologic / Lymphatic: No excessive bleeding, easy bruising, fatigue, lymphadenopathy and petechiae  Musculoskeletal: No joint pain, joint stiffness, joint swelling, muscle pain, muscle weakness and neck pain  Neurological: No dizziness, headaches, light headedness, seizures and vertigo  Behavioral/Psych: No depression, homicidal ideations and suicidal ideations  Endocrine: No frequent urination and nocturia, temperature intolerance, weight gain, unintended and weight loss, unintended      Objective:     Objective:  Vital Signs (last 24 hours)       08/23 0700  -  08/24 0659 08/24 0700  -  08/24 1009   Most Recent    Temp (°F) 97.9 -  98.5    97.6 -  98.2     97.6 (36.4)    Heart Rate 69 -  89    70 -  (!)152     (!) 152    Resp 17 -  20      18     18    /74 -  141/88    122/78 -  (!) 168/106     144/91    SpO2 (%) 94 -  98      96     96          Body mass index is 32.29 kg/m².  Weight change:           Physical Exam:   General Appearance:    Alert, oriented, cooperative, in no acute distress   Head:    Normocephalic, atraumatic, without obvious abnormality   Eyes:            Lids and lashes normal, conjunctivae and sclerae normal, no   icterus, no pallor   Ears:    Ears appear intact with no abnormalities noted   Throat:   Mucous membranes pink and moist   Neck:   Supple, trachea midline, no carotid bruit, no JVD   Lungs:     Clear to auscultation, respirations regular, even and                   Unlabored. No wheezes, rales, rhonchi    Heart:     irregularly irregular rhythm, normal S1 and S2, no            murmur, no gallop, no rub, no click   Abdomen:     Normal bowel sounds, no masses, liver and spleen nonpalpable, soft, non-tender, non-distended, no guarding, no rebound tenderness   Genitalia:    Deferred   Extremities:   Moves all extremities well, no edema, no cyanosis, no              Redness, no clubbing   Pulses:    Pulses palpable and equal bilaterally   Skin:   No bleeding, bruising or rash   Neurologic:   Alert and oriented to person, place, and time. No focal neurological deficits       Lab Review:       Results from last 7 days  Lab Units 08/24/18  0045  08/21/18  2306   SODIUM mmol/L 136  < > 135   POTASSIUM mmol/L 4.0  < > 4.1   CHLORIDE mmol/L 107  < > 104   CO2 mmol/L 22.5*  < > 21.8*   BUN mg/dL 16  < > 23*   CREATININE mg/dL 1.50*  < > 1.65*   CALCIUM mg/dL 8.8  < > 9.5   BILIRUBIN mg/dL  --   --  2.3*   ALK PHOS U/L  --   --  407*   ALT (SGPT) U/L  --   --  387*   AST (SGOT) U/L  --   --  397*   GLUCOSE mg/dL 159*  < > 261*   < > = values in this interval not displayed.    Results from last 7 days  Lab Units 08/24/18  0929   CK TOTAL U/L 107   TROPONIN I ng/mL 0.029   CK MB INDEX % 1.6           Results from last 7 days  Lab Units 08/24/18  0045   WBC 10*3/mm3 3.98*   HEMOGLOBIN g/dL 11.4*   HEMATOCRIT % 35.4*   PLATELETS 10*3/mm3 168       Results from last 7 days  Lab Units 08/21/18  2306   INR  1.08   APTT seconds 27.7                   Invalid input(s):  T4,  FREET4    EKG:   ECG/EMG Results (last 24 hours)     Procedure Component Value Units Date/Time    ECG 12 Lead [[id number]] Collected:  08/24/18 0906     Updated:  08/24/18 0908    Narrative:       Test Reason : chest pain  Blood Pressure : **/** mmHG  Vent. Rate : 087 BPM     Atrial Rate : 087 BPM     P-R Int : 180 ms          QRS Dur : 096 ms      QT Int : 348 ms       P-R-T Axes : 032 017 064 degrees     QTc Int : 418 ms    Normal sinus rhythm  Inferior infarct , age undetermined  T wave abnormality, consider anterior ischemia  Abnormal ECG  When compared with ECG of 22-OCT-2017 02:25,  Sinus rhythm has replaced Atrial fibrillation  Vent. rate has decreased BY  54 BPM  Inferior infarct is now present  Non-specific change in ST segment in Inferior leads  T wave inversion now evident in Anterior leads    Referred By:             Confirmed By:            Imaging:  Imaging Results (last 24 hours)     Procedure Component Value Units Date/Time    CT Abdomen Pelvis With Contrast [[id number]] Collected:  08/23/18 0725     Updated:  08/23/18 1118    Narrative:       CT ABDOMEN PELVIS WITH CONTRAST-     CLINICAL INDICATION: Neoplasm: pancreas, suspected; A41.9-Sepsis,  unspecified organism; J18.9-Pneumonia, unspecified organism; R91.8-Other  nonspecific abnormal finding of lung field.          COMPARISON: None available.     TECHNIQUE: Axial images were acquired from the lung bases through the  pubic symphysis after IV and oral contrast.  Reformatted images were created in both the coronal and sagittal planes.     Radiation dose reduction techniques were utilized per ALARA protocol.  Automated exposure control was initiated through either or [employer] or  DoseRight software packages by  protocol.        FINDINGS:   Tiny left basilar nodule measuring 4 mm on image 14 of the axial series.  Minimal bibasilar atelectasis.     The liver is homogeneous. There is no evidence of focal hepatic mass     The spleen is homogeneous     Indistinct appearance of the pancreatic head. A biliary stent is  present.     Small left adrenal nodule is present.     The kidneys show no evidence of hydronephrosis or hydroureter. I do not  see any distal ureteral stones.      Otherwise I do not see any free fluid or walled off fluid collections.     Large volume stool is present in the colon.               Impression:       1. Slightly enlarged, indistinct appearance of the pancreatic head.  Underlying neoplasm certainly not excluded but no delineable mass is  present. There is a biliary stent.  2. Tiny nodule in the left lung base.  3. Small left adrenal nodule.  4. Hiatal hernia.  5. Moderate to large volume stool in the colon.                   This report was finalized on 8/23/2018 11:15 AM by Dr. Ernesto Bell MD.             I personally viewed and interpreted the patient's  EKG/Telemetry data.    Assessment:     Active Problems:    Sepsis (CMS/HCC)    Paroxysmal atrial fibrillation:  EKG and cardiac enzymes have been ordered.  Patient just didn't be transferred to [facility]. and started on IV Cardizem drip for rate control.      Plan:       check cardiac enzymes.  IV Cardizem for rate control.  Further recommendation and then to be depending on response of initial treatment.  Flecainide and Eliquis will be continued at the same dosage.      Rukhsana Perez MD  08/24/18  10:09 AM

## 2018-08-24 NOTE — PLAN OF CARE
Problem: Patient Care Overview  Goal: Plan of Care Review   08/24/18 0636   Plan of Care Review   Progress no change   Coping/Psychosocial   Plan of Care Reviewed With patient

## 2018-08-24 NOTE — PLAN OF CARE
Problem: Patient Care Overview  Goal: Plan of Care Review  Outcome: Ongoing (interventions implemented as appropriate)   08/24/18 0636 08/24/18 0858   Plan of Care Review   Progress no change --    Coping/Psychosocial   Plan of Care Reviewed With --  patient       Problem: Fall Risk (Adult)  Goal: Identify Related Risk Factors and Signs and Symptoms  Outcome: Ongoing (interventions implemented as appropriate)    Goal: Absence of Fall  Outcome: Ongoing (interventions implemented as appropriate)   08/24/18 1000   Fall Risk (Adult)      08/24/18 1111   Fall Risk (Adult)   Absence of Fall making progress toward outcome       Problem: Infection, Risk/Actual (Adult)  Goal: Identify Related Risk Factors and Signs and Symptoms  Outcome: Ongoing (interventions implemented as appropriate)   08/23/18 0049   Infection, Risk/Actual (Adult)   Related Risk Factors (Infection, Risk/Actual) age extremes;chronic illness/condition;surgery/procedure   Signs and Symptoms (Infection, Risk/Actual) lab value changes;pain;cultures positive     Goal: Infection Prevention/Resolution  Outcome: Ongoing (interventions implemented as appropriate)   08/24/18 1000   Infection, Risk/Actual (Adult)   Infection Prevention/Resolution making progress toward outcome       Problem: Skin Injury Risk (Adult)  Goal: Identify Related Risk Factors and Signs and Symptoms  Outcome: Ongoing (interventions implemented as appropriate)   08/23/18 0049   Skin Injury Risk (Adult)   Related Risk Factors (Skin Injury Risk) advanced age;infection;medication     Goal: Skin Health and Integrity  Outcome: Ongoing (interventions implemented as appropriate)   08/24/18 1000   Skin Injury Risk (Adult)   Skin Health and Integrity making progress toward outcome

## 2018-08-24 NOTE — PROGRESS NOTES
"  I have personally seen and examined the patient today and discussed overnight interval progress and pertinent issues with nursing staff.    Subjective       Blood cultures ×2 sets from 8/21/18 finalized with Klebsiella fairly susceptible and only resistant to ampicillin.  Repeat blood cultures on 8/23/18 showing no growth so far.  Patient is in significant amount of pain today to the abdomen. Case discussed at length with the patient and his wife.    History taken from: patient chart family RN      Objective       Vital Signs    /73 (BP Location: Right arm, Patient Position: Lying)   Pulse (!) 138   Temp 97.8 °F (36.6 °C) (Oral)   Resp 18   Ht 180.3 cm (71\")   Wt 105 kg (231 lb 8 oz)   SpO2 96%   BMI 32.29 kg/m²     Temp:  [97.6 °F (36.4 °C)-98.5 °F (36.9 °C)] 97.8 °F (36.6 °C)      Intake/Output Summary (Last 24 hours) at 08/24/18 1043  Last data filed at 08/24/18 1011   Gross per 24 hour   Intake             3060 ml   Output                0 ml   Net             3060 ml     Intake & Output (last 3 days)       08/21 0701 - 08/22 0700 08/22 0701 - 08/23 0700 08/23 0701 - 08/24 0700 08/24 0701 - 08/25 0700    P.O.   1080 480    I.V. (mL/kg)   1000 (9.5)     IV Piggyback 5200 250 500 100    Total Intake(mL/kg) 5200 (51) 250 (2.4) 2580 (24.6) 580 (5.5)    Net +5200 +250 +2580 +580            Unmeasured Urine Occurrence  2 x 7 x     Unmeasured Stool Occurrence  0 x 1 x           Objective    General Appearance:    Alert, cooperative, in no acute distress, uncomfortable   Head:    Normocephalic, without obvious abnormality, atraumatic   Eyes:            Lids and lashes normal, conjunctivae and sclerae normal, no   icterus, no pallor, corneas clear, PERRLA   Ears:    Ears appear intact with no abnormalities noted   Throat:   No oral lesions, no thrush, oral mucosa moist   Neck:   No adenopathy, supple, trachea midline, no thyromegaly, no   carotid bruit, no JVD   Back:     No tenderness to percussion or " palpation, range of motion   normal   Lungs:     Clear to auscultation,respirations regular, even and unlabored. No wheezing, no ronchi and no crackles.    Heart:    Regular rhythm and normal rate, normal S1 and S2, no            murmur, no gallop, no rub, no click   Chest Wall:    No abnormalities observed   Abdomen:     Normal bowel sounds, no masses, no organomegaly, soft        Significantly tender, non-distended, no guarding, no rebound        tenderness   Rectal:     Deferred   Extremities:   Moves all extremities well, no edema, no cyanosis, no             redness   Pulses:   Pulses palpable and equal bilaterally   Skin:   No bleeding, bruising or rash   Lymph nodes:   No palpable adenopathy   Neurologic:   Awake, alert and oriented x 3. Following commands.           Results:      Results from last 7 days  Lab Units 08/24/18  0045 08/23/18  0116 08/21/18  2306   WBC 10*3/mm3 3.98* 6.77 11.04     Lab Results   Component Value Date    NEUTROABS 2.38 08/24/2018         Results from last 7 days  Lab Units 08/24/18  0045   CREATININE mg/dL 1.50*         Results from last 7 days  Lab Units 08/22/18  0234   CRP mg/dL 1.99*       Imaging Results (last 24 hours)     Procedure Component Value Units Date/Time    CT Abdomen Pelvis With Contrast [[id number]] Collected:  08/23/18 0725     Updated:  08/23/18 1118    Narrative:       CT ABDOMEN PELVIS WITH CONTRAST-     CLINICAL INDICATION: Neoplasm: pancreas, suspected; A41.9-Sepsis,  unspecified organism; J18.9-Pneumonia, unspecified organism; R91.8-Other  nonspecific abnormal finding of lung field.          COMPARISON: None available.     TECHNIQUE: Axial images were acquired from the lung bases through the  pubic symphysis after IV and oral contrast.  Reformatted images were created in both the coronal and sagittal planes.     Radiation dose reduction techniques were utilized per ALARA protocol.  Automated exposure control was initiated through either or [employer]  or  [employer] software packages by  protocol.        FINDINGS:   Tiny left basilar nodule measuring 4 mm on image 14 of the axial series.  Minimal bibasilar atelectasis.     The liver is homogeneous. There is no evidence of focal hepatic mass     The spleen is homogeneous     Indistinct appearance of the pancreatic head. A biliary stent is  present.     Small left adrenal nodule is present.     The kidneys show no evidence of hydronephrosis or hydroureter. I do not  see any distal ureteral stones.      Otherwise I do not see any free fluid or walled off fluid collections.     Large volume stool is present in the colon.               Impression:       1. Slightly enlarged, indistinct appearance of the pancreatic head.  Underlying neoplasm certainly not excluded but no delineable mass is  present. There is a biliary stent.  2. Tiny nodule in the left lung base.  3. Small left adrenal nodule.  4. Hiatal hernia.  5. Moderate to large volume stool in the colon.                   This report was finalized on 8/23/2018 11:15 AM by Dr. Ernesto Bell MD.               Results Review:    I have personally reviewed laboratory data, culture results, radiology studies and antimicrobial therapy.    Hospital Medications (active)       Dose Frequency Start End    albuterol (PROVENTIL) nebulizer solution 0.083% 2.5 mg/3mL 2.5 mg Every 6 Hours PRN 8/22/2018     Sig - Route: Take 2.5 mg by nebulization Every 6 (Six) Hours As Needed for Wheezing or Shortness of Air. - Nebulization    budesonide (PULMICORT) nebulizer solution 0.5 mg 0.5 mg 2 Times Daily - RT 8/22/2018     Sig - Route: Take 2 mL by nebulization 2 (Two) Times a Day. - Nebulization    cefepime (MAXIPIME) 2 g/100 mL 0.9% NS (mbp) 2 g Once 8/23/2018 8/23/2018    Sig - Route: Infuse 100 mL into a venous catheter 1 (One) Time. - Intravenous    cefTRIAXone (ROCEPHIN) 2 g/100 mL 0.9% NS VTB (ANGELIQUE) 2 g Every 24 Hours 8/24/2018 8/31/2018    Sig - Route: Infuse 100 mL  into a venous catheter Daily. - Intravenous    Cosign for Ordering: Required by Davin Faye MD    cholecalciferol (VITAMIN D3) tablet 1,000 Units 1,000 Units Daily 8/22/2018     Sig - Route: Take 2.5 tablets by mouth Daily. - Oral    diltiaZEM (CARDIZEM) 125 mg in sodium chloride 0.9 % 125 mL (1 mg/mL) infusion 5-15 mg/hr Titrated 8/24/2018     Sig - Route: Infuse 5-15 mg/hr into a venous catheter Dose Adjusted By Provider As Needed. - Intravenous    diltiazem (CARDIZEM) bolus from bag 1 mg/mL 10 mg 10 mg Once 8/24/2018     Sig - Route: Infuse 10 mg into a venous catheter 1 (One) Time. - Intravenous    flecainide (TAMBOCOR) tablet 50 mg 50 mg Every 12 Hours Scheduled 8/22/2018     Sig - Route: Take 1 tablet by mouth Every 12 (Twelve) Hours. - Oral    FLUoxetine (PROzac) capsule 20 mg 20 mg Nightly 8/22/2018     Sig - Route: Take 1 capsule by mouth Every Night. - Oral    FLUoxetine (PROzac) capsule 40 mg 40 mg Every Morning 8/23/2018     Sig - Route: Take 2 capsules by mouth Every Morning. - Oral    HYDROcodone-acetaminophen (NORCO) 5-325 MG per tablet 1 tablet 1 tablet Every 6 Hours PRN 8/23/2018 9/2/2018    Sig - Route: Take 1 tablet by mouth Every 6 (Six) Hours As Needed for Moderate Pain . - Oral    losartan (COZAAR) tablet 25 mg 25 mg Daily 8/22/2018     Sig - Route: Take 1 tablet by mouth Daily. - Oral    metoprolol tartrate (LOPRESSOR) tablet 12.5 mg 12.5 mg Every 12 Hours Scheduled 8/22/2018     Sig - Route: Take 0.5 tablets by mouth Every 12 (Twelve) Hours. - Oral    metroNIDAZOLE (FLAGYL) IVPB 500 mg 500 mg Every 8 Hours 8/23/2018 9/2/2018    Sig - Route: Infuse 100 mL into a venous catheter Every 8 (Eight) Hours. - Intravenous    montelukast (SINGULAIR) tablet 10 mg 10 mg Nightly 8/22/2018     Sig - Route: Take 1 tablet by mouth Every Night. - Oral    naloxone (NARCAN) injection 0.4 mg 0.4 mg Every 5 Minutes PRN 8/22/2018     Sig - Route: Infuse 1 mL into a venous catheter Every 5 (Five) Minutes  "As Needed for Respiratory Depression. - Intravenous    Linked Group 1:  \"And\" Linked Group Details        nitroglycerin (NITROSTAT) SL tablet 0.4 mg 0.4 mg As Needed 8/22/2018     Sig - Route: Place 1 tablet under the tongue As Needed for Chest Pain. - Sublingual    ondansetron (ZOFRAN) tablet 4 mg 4 mg Every 4 Hours PRN 8/22/2018     Sig - Route: Take 1 tablet by mouth Every 4 (Four) Hours As Needed for Nausea or Vomiting. - Oral    oxyCODONE (oxyCONTIN) 12 hr tablet 10 mg 10 mg Every 12 Hours Scheduled 8/23/2018 9/2/2018    Sig - Route: Take 1 tablet by mouth Every 12 (Twelve) Hours. - Oral    pantoprazole (PROTONIX) EC tablet 40 mg 40 mg Every Morning 8/23/2018     Sig - Route: Take 1 tablet by mouth Every Morning. - Oral    Pharmacy Consult - Pharmacy to dose  Continuous PRN 8/24/2018     Sig - Route: Continuous As Needed for Consult. - Does not apply    pioglitazone (ACTOS) tablet 30 mg 30 mg Daily 8/22/2018     Sig - Route: Take 2 tablets by mouth Daily. - Oral    polyethylene glycol (MIRALAX) powder 17 g 17 g Daily PRN 8/22/2018     Sig - Route: Take 17 g by mouth Daily As Needed for Constipation. - Oral    sodium chloride 0.9 % flush 1-10 mL 1-10 mL As Needed 8/22/2018     Sig - Route: Infuse 1-10 mL into a venous catheter As Needed for Line Care. - Intravenous    sodium chloride 0.9 % flush 10 mL 10 mL As Needed 8/21/2018     Sig - Route: Infuse 10 mL into a venous catheter As Needed for Line Care. - Intravenous    Cosign for Ordering: Accepted by Tyree Carballo MD on 8/22/2018  2:42 AM    Linked Group 2:  \"And\" Linked Group Details        sodium chloride 0.9 % with KCl 20 mEq/L infusion 100 mL/hr Continuous 8/22/2018     Sig - Route: Infuse 100 mL/hr into a venous catheter Continuous. - Intravenous    tiotropium bromide-olodaterol (STIOLTO RESPIMAT) 2.5-2.5 MCG/ACT inhaler 2 puff 2 puff Nightly 8/22/2018     Sig - Route: Inhale 2 puffs Every Night. - Inhalation    Non-formulary Exception Code: " Patient supplied medication    cefepime (MAXIPIME) 2 g/100 mL 0.9% NS (mbp) (Discontinued) 2 g Every 12 Hours 8/23/2018 8/24/2018    Sig - Route: Infuse 100 mL into a venous catheter Every 12 (Twelve) Hours. - Intravenous    colchicine tablet 0.6 mg (Discontinued) 0.6 mg Daily 8/22/2018 8/24/2018    Sig - Route: Take 1 tablet by mouth Daily. - Oral    HYDROcodone-acetaminophen (NORCO) 5-325 MG per tablet 1 tablet (Discontinued) 1 tablet Every 4 Hours PRN 8/22/2018 8/23/2018    Sig - Route: Take 1 tablet by mouth Every 4 (Four) Hours As Needed for Moderate Pain . - Oral    HYDROmorphone (DILAUDID) injection 1 mg (Discontinued) 1 mg Every 2 Hours PRN 8/22/2018 8/23/2018    Sig - Route: Infuse 1 mL into a venous catheter Every 2 (Two) Hours As Needed for Severe Pain . - Intravenous    Morphine (MS CONTIN) 12 hr tablet 15 mg (Discontinued) 15 mg Every 12 Hours Scheduled 8/23/2018 8/23/2018    Sig - Route: Take 1 tablet by mouth Every 12 (Twelve) Hours. - Oral            Cultures:    Blood Culture   Date Value Ref Range Status   08/23/2018 No growth at less than 24 hours  Preliminary   08/23/2018 No growth at less than 24 hours  Preliminary   08/21/2018 Klebsiella pneumoniae (A)  Final   08/21/2018 Klebsiella pneumoniae (A)  Final         ASSESSMENT/PLAN           Assessment/Plan     ASSESSMENT:    1.  Severe sepsis and lactic acid greater than 2 on admission  2.  Bacteremia     PLAN:     Blood cultures ×2 sets from 8/21/18 finalized with Klebsiella fairly susceptible and only resistant to ampicillin.  Repeat blood cultures on 8/23/18 showing no growth so far.  Patient is in significant amount of pain today to the abdomen.  Based on culture results would recommend to de-escalate cefepime to Rocephin 2 g IV every 24 hours and Flagyl to de-escalate on discharged to Omnicef 300 mg by mouth twice a day and Flagyl 500 mg by mouth 3 times a day to continue for 10 day course through 9/3/18 as long as repeat blood cultures  remain negative. Case discussed at length with the patient and his wife.    We are concerned about bacteremia post cholecystectomy and in the setting of biliary stent and abnormal LFTs acute cholangitis is possible but unlikely.      Patient's findings and recommendations were discussed with patient, family and nursing staff    Code Status:   Code Status and Medical Interventions:   Ordered at: 08/22/18 1525     Level Of Support Discussed With:    Patient     Code Status:    CPR     Medical Interventions (Level of Support Prior to Arrest):    Full       Tamera Schwab PA-C  08/24/18  10:43 AM

## 2018-08-24 NOTE — NURSING NOTE
Called Dr. Vaca per Dr. Downs request to have him come and assess patient for chest pain that was not relieved with nitroglycerin 0.4mg SL x 3. Per Dr. Vaca he will be over to see the patient.

## 2018-08-24 NOTE — PLAN OF CARE
Problem: Fall Risk (Adult)  Goal: Absence of Fall  Outcome: Ongoing (interventions implemented as appropriate)      Problem: Infection, Risk/Actual (Adult)  Goal: Identify Related Risk Factors and Signs and Symptoms  Outcome: Ongoing (interventions implemented as appropriate)    Goal: Infection Prevention/Resolution  Outcome: Ongoing (interventions implemented as appropriate)      Problem: Skin Injury Risk (Adult)  Goal: Identify Related Risk Factors and Signs and Symptoms  Outcome: Ongoing (interventions implemented as appropriate)    Goal: Skin Health and Integrity  Outcome: Ongoing (interventions implemented as appropriate)

## 2018-08-24 NOTE — PROGRESS NOTES
Chief complaint: Abdominal pain     Patient with persistent abdominal pain.  He has developed atrial fibrillation this morning with heart rate in the 130s to 140s but normotensive.  Repeat blood cultures remain negative.     Afebrile, atrial fibrillation  Clear to auscultation bilaterally  Scleral icterus  Abdomen soft, mild epigastric tenderness to palpation     68-year-old male admitted for Klebsiella and Enterobacter bacteremia currently on broad-spectrum antibiotics awaiting cultures and sensitivities.  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  Bilirubin is elevated and ERCP with stent has been performed to alleviate stricture.  Cytology negative at this time.     -Advance diet  -Pain control  -Patient has outpatient surgical oncology follow-up scheduled with Serafin Gore M.D.  -We'll convert to oral antibiotics per culture and susceptibility results he'll require 10 day total course of Omnicef and Flagyl  -Okay for discharge home once atrial fibrillation has stabilized per cardiology recommendations

## 2018-08-24 NOTE — PROGRESS NOTES
Medical records were faxed to [phone]. Appointment date and time was given to  along with all other information.    Miquel Brody MD   [facility] [place] [id number] [phone]    Appointment date and time 8/29 @ 2:30pm

## 2018-08-24 NOTE — PROGRESS NOTES
Continued Stay Note  QUINCY Jorgensen     Patient Name: Todd Phillips  MRN: [id number]  Today's Date: 8/24/2018    Admit Date: 8/21/2018          Discharge Plan     Row Name 08/24/18 1214       Plan    Plan Transferred to Telemetry today. HR up to 140. Started on IV Cardizem. Also, on Eliquis PO. CM will follow and assist as needed.              Discharge Codes    No documentation.           Emily Hernandez RN

## 2018-08-25 VITALS
BODY MASS INDEX: 32.51 KG/M2 | HEART RATE: 75 BPM | WEIGHT: 232.2 LBS | SYSTOLIC BLOOD PRESSURE: 153 MMHG | TEMPERATURE: 97.9 F | HEIGHT: 71 IN | DIASTOLIC BLOOD PRESSURE: 91 MMHG | OXYGEN SATURATION: 97 % | RESPIRATION RATE: 18 BRPM

## 2018-08-25 PROBLEM — A41.9 SEPSIS (HCC): Status: RESOLVED | Noted: 2018-08-22 | Resolved: 2018-08-25

## 2018-08-25 LAB
ANION GAP SERPL CALCULATED.3IONS-SCNC: 6.5 MMOL/L (ref 3.6–11.2)
BASOPHILS # BLD AUTO: 0.04 10*3/MM3 (ref 0–0.3)
BASOPHILS NFR BLD AUTO: 0.7 % (ref 0–2)
BUN BLD-MCNC: 14 MG/DL (ref 7–21)
BUN/CREAT SERPL: 10.7 (ref 7–25)
CALCIUM SPEC-SCNC: 8.8 MG/DL (ref 7.7–10)
CHLORIDE SERPL-SCNC: 108 MMOL/L (ref 99–112)
CO2 SERPL-SCNC: 22.5 MMOL/L (ref 24.3–31.9)
CREAT BLD-MCNC: 1.31 MG/DL (ref 0.43–1.29)
CRP SERPL-MCNC: 2.88 MG/DL (ref 0–0.99)
DEPRECATED RDW RBC AUTO: 45.3 FL (ref 37–54)
EOSINOPHIL # BLD AUTO: 0.25 10*3/MM3 (ref 0–0.7)
EOSINOPHIL NFR BLD AUTO: 4.5 % (ref 0–7)
ERYTHROCYTE [DISTWIDTH] IN BLOOD BY AUTOMATED COUNT: 14 % (ref 11.5–14.5)
GFR SERPL CREATININE-BSD FRML MDRD: 54 ML/MIN/1.73
GLUCOSE BLD-MCNC: 120 MG/DL (ref 70–110)
HCT VFR BLD AUTO: 35.6 % (ref 42–52)
HGB BLD-MCNC: 11.7 G/DL (ref 14–18)
IMM GRANULOCYTES # BLD: 0.07 10*3/MM3 (ref 0–0.03)
IMM GRANULOCYTES NFR BLD: 1.3 % (ref 0–0.5)
LYMPHOCYTES # BLD AUTO: 1.87 10*3/MM3 (ref 1–3)
LYMPHOCYTES NFR BLD AUTO: 33.8 % (ref 16–46)
MCH RBC QN AUTO: 30 PG (ref 27–33)
MCHC RBC AUTO-ENTMCNC: 32.9 G/DL (ref 33–37)
MCV RBC AUTO: 91.3 FL (ref 80–94)
MONOCYTES # BLD AUTO: 0.84 10*3/MM3 (ref 0.1–0.9)
MONOCYTES NFR BLD AUTO: 15.2 % (ref 0–12)
NEUTROPHILS # BLD AUTO: 2.46 10*3/MM3 (ref 1.4–6.5)
NEUTROPHILS NFR BLD AUTO: 44.5 % (ref 40–75)
OSMOLALITY SERPL CALC.SUM OF ELEC: 275.5 MOSM/KG (ref 273–305)
PLATELET # BLD AUTO: 195 10*3/MM3 (ref 130–400)
PMV BLD AUTO: 10.6 FL (ref 6–10)
POTASSIUM BLD-SCNC: 4.2 MMOL/L (ref 3.5–5.3)
RBC # BLD AUTO: 3.9 10*6/MM3 (ref 4.7–6.1)
SODIUM BLD-SCNC: 137 MMOL/L (ref 135–153)
WBC NRBC COR # BLD: 5.53 10*3/MM3 (ref 4.5–12.5)

## 2018-08-25 PROCEDURE — 94799 UNLISTED PULMONARY SVC/PX: CPT

## 2018-08-25 PROCEDURE — 85025 COMPLETE CBC W/AUTO DIFF WBC: CPT | Performed by: SURGERY

## 2018-08-25 PROCEDURE — 25810000003 SODIUM CHLORIDE 0.9 % WITH KCL 20 MEQ 20-0.9 MEQ/L-% SOLUTION: Performed by: SURGERY

## 2018-08-25 PROCEDURE — 99238 HOSP IP/OBS DSCHRG MGMT 30/<: CPT | Performed by: SURGERY

## 2018-08-25 PROCEDURE — 86140 C-REACTIVE PROTEIN: CPT | Performed by: PHYSICIAN ASSISTANT

## 2018-08-25 PROCEDURE — 99232 SBSQ HOSP IP/OBS MODERATE 35: CPT | Performed by: INTERNAL MEDICINE

## 2018-08-25 PROCEDURE — 80048 BASIC METABOLIC PNL TOTAL CA: CPT | Performed by: SURGERY

## 2018-08-25 RX ORDER — METRONIDAZOLE 250 MG/1
500 TABLET ORAL EVERY 8 HOURS SCHEDULED
Status: DISCONTINUED | OUTPATIENT
Start: 2018-08-25 | End: 2018-08-25 | Stop reason: HOSPADM

## 2018-08-25 RX ORDER — HYDROCODONE BITARTRATE AND ACETAMINOPHEN 5; 325 MG/1; MG/1
1 TABLET ORAL EVERY 6 HOURS PRN
Qty: 20 TABLET | Refills: 0 | Status: SHIPPED | OUTPATIENT
Start: 2018-08-25 | End: 2018-09-01

## 2018-08-25 RX ORDER — CEFDINIR 300 MG/1
300 CAPSULE ORAL EVERY 12 HOURS SCHEDULED
Qty: 19 CAPSULE | Refills: 0 | Status: SHIPPED | OUTPATIENT
Start: 2018-08-25 | End: 2018-09-04

## 2018-08-25 RX ORDER — ARFORMOTEROL TARTRATE 15 UG/2ML
15 SOLUTION RESPIRATORY (INHALATION)
Status: DISCONTINUED | OUTPATIENT
Start: 2018-08-25 | End: 2018-08-25 | Stop reason: HOSPADM

## 2018-08-25 RX ORDER — METRONIDAZOLE 500 MG/1
500 TABLET ORAL EVERY 8 HOURS SCHEDULED
Qty: 26 TABLET | Refills: 0 | Status: SHIPPED | OUTPATIENT
Start: 2018-08-25 | End: 2018-09-03

## 2018-08-25 RX ORDER — HYDROCODONE BITARTRATE AND ACETAMINOPHEN 10; 325 MG/1; MG/1
1 TABLET ORAL EVERY 6 HOURS PRN
Qty: 30 TABLET | Refills: 0 | Status: SHIPPED | OUTPATIENT
Start: 2018-08-25 | End: 2018-08-25

## 2018-08-25 RX ORDER — HYDROCODONE BITARTRATE AND ACETAMINOPHEN 5; 325 MG/1; MG/1
1 TABLET ORAL EVERY 6 HOURS PRN
Qty: 20 TABLET | Refills: 0 | Status: SHIPPED | OUTPATIENT
Start: 2018-08-25 | End: 2018-08-25

## 2018-08-25 RX ADMIN — PANTOPRAZOLE SODIUM 40 MG: 40 TABLET, DELAYED RELEASE ORAL at 05:11

## 2018-08-25 RX ADMIN — METRONIDAZOLE 500 MG: 250 TABLET ORAL at 14:02

## 2018-08-25 RX ADMIN — PIOGLITAZONE 30 MG: 15 TABLET ORAL at 08:45

## 2018-08-25 RX ADMIN — POTASSIUM CHLORIDE AND SODIUM CHLORIDE 100 ML/HR: 900; 150 INJECTION, SOLUTION INTRAVENOUS at 05:13

## 2018-08-25 RX ADMIN — HYDROCODONE BITARTRATE AND ACETAMINOPHEN 1 TABLET: 5; 325 TABLET ORAL at 05:20

## 2018-08-25 RX ADMIN — FLUOXETINE HYDROCHLORIDE 40 MG: 20 CAPSULE ORAL at 08:47

## 2018-08-25 RX ADMIN — APIXABAN 5 MG: 5 TABLET, FILM COATED ORAL at 08:47

## 2018-08-25 RX ADMIN — LOSARTAN POTASSIUM 25 MG: 25 TABLET, FILM COATED ORAL at 08:45

## 2018-08-25 RX ADMIN — CEFDINIR 300 MG: 300 CAPSULE ORAL at 08:45

## 2018-08-25 RX ADMIN — CHOLECALCIFEROL TAB 10 MCG (400 UNIT) 1000 UNITS: 10 TAB at 08:46

## 2018-08-25 RX ADMIN — METRONIDAZOLE 500 MG: 500 INJECTION, SOLUTION INTRAVENOUS at 05:11

## 2018-08-25 RX ADMIN — METOPROLOL TARTRATE 12.5 MG: 25 TABLET, FILM COATED ORAL at 08:45

## 2018-08-25 RX ADMIN — ALBUTEROL SULFATE 2.5 MG: 2.5 SOLUTION RESPIRATORY (INHALATION) at 09:30

## 2018-08-25 RX ADMIN — OXYCODONE HYDROCHLORIDE 10 MG: 10 TABLET, FILM COATED, EXTENDED RELEASE ORAL at 08:51

## 2018-08-25 RX ADMIN — BUDESONIDE 0.5 MG: 0.5 SUSPENSION RESPIRATORY (INHALATION) at 09:30

## 2018-08-25 RX ADMIN — IPRATROPIUM BROMIDE 0.5 MG: 0.5 SOLUTION RESPIRATORY (INHALATION) at 13:52

## 2018-08-25 RX ADMIN — FLECAINIDE ACETATE 50 MG: 50 TABLET ORAL at 08:45

## 2018-08-25 NOTE — PROGRESS NOTES
Name: Todd Phillips  Age/Sex: 68 y.o. male  :  1950        PCP: Adiel Denney MD    Subjective   Feels better, no chest pain.    Objective   Vital Signs  Temp:  [97.9 °F (36.6 °C)-98.2 °F (36.8 °C)] 97.9 °F (36.6 °C)  Heart Rate:  [68-79] 75  Resp:  [18] 18  BP: (125-158)/() 153/91  Body mass index is 32.39 kg/m².      PEX:  Neck: Supple no JVD  Lung: CTA B   COR: RRR No m/r/g  Abd: Soft NT ND  Ext: No CCE, DP intact B      Objective    Results Review:       I reviewed the patient's new clinical results.    Results from last 7 days  Lab Units 18  0401 18  0045 18  0116 18  2306   WBC 10*3/mm3 5.53 3.98* 6.77 11.04   HEMOGLOBIN g/dL 11.7* 11.4* 10.9* 13.0*   PLATELETS 10*3/mm3 195 168 159 210     Results from last 7 days  Lab Units 18  0401 18  0045 18  0116 18  2306   SODIUM mmol/L 137 136 134* 135   POTASSIUM mmol/L 4.2 4.0 4.2 4.1   CHLORIDE mmol/L 108 107 106 104   CO2 mmol/L 22.5* 22.5* 21.4* 21.8*   BUN mg/dL 14 16 14 23*   CREATININE mg/dL 1.31* 1.50* 1.39* 1.65*   CALCIUM mg/dL 8.8 8.8 8.5 9.5   GLUCOSE mg/dL 120* 159* 108 261*   Estimated Creatinine Clearance: 64.7 mL/min (A) (by C-G formula based on SCr of 1.31 mg/dL (H)).  Imaging Results (last 72 hours)     Procedure Component Value Units Date/Time    CT Abdomen Pelvis With Contrast [[id number]] Collected:  18     Updated:  18    Narrative:       CT ABDOMEN PELVIS WITH CONTRAST-     CLINICAL INDICATION: Neoplasm: pancreas, suspected; A41.9-Sepsis,  unspecified organism; J18.9-Pneumonia, unspecified organism; R91.8-Other  nonspecific abnormal finding of lung field.          COMPARISON: None available.     TECHNIQUE: Axial images were acquired from the lung bases through the  pubic symphysis after IV and oral contrast.  Reformatted images were created in both the coronal and sagittal planes.     Radiation dose reduction techniques were utilized per ALARA  protocol.  Automated exposure control was initiated through either or [employer] or  [employer] software packages by  protocol.        FINDINGS:   Tiny left basilar nodule measuring 4 mm on image 14 of the axial series.  Minimal bibasilar atelectasis.     The liver is homogeneous. There is no evidence of focal hepatic mass     The spleen is homogeneous     Indistinct appearance of the pancreatic head. A biliary stent is  present.     Small left adrenal nodule is present.     The kidneys show no evidence of hydronephrosis or hydroureter. I do not  see any distal ureteral stones.      Otherwise I do not see any free fluid or walled off fluid collections.     Large volume stool is present in the colon.               Impression:       1. Slightly enlarged, indistinct appearance of the pancreatic head.  Underlying neoplasm certainly not excluded but no delineable mass is  present. There is a biliary stent.  2. Tiny nodule in the left lung base.  3. Small left adrenal nodule.  4. Hiatal hernia.  5. Moderate to large volume stool in the colon.                   This report was finalized on 8/23/2018 11:15 AM by Dr. Ernesto Bell MD.               Assessment/Plan   Active Problems:    Sepsis (CMS/HCC)  Paroxysmal atrial fibrillation    PLAN   Continue Flecainide at current dosage.  Pt converted to sinus rhythm shortly after transfer yesterday.        Rukhsana Perez MD  08/25/18  3:31 PM

## 2018-08-25 NOTE — PROGRESS NOTES
"  I have personally seen and examined the patient today and discussed overnight interval progress and pertinent issues with nursing staff.    Subjective     The patient is feeling better today with less pain today.  Repeat blood culture showing no growth so far.  Fever or diarrhea reported.      History taken from: patient chart RN      Objective       Vital Signs    /86 (BP Location: Left arm, Patient Position: Lying)   Pulse 78   Temp 98.2 °F (36.8 °C) (Oral)   Resp 18   Ht 180.3 cm (71\")   Wt 105 kg (232 lb 3.2 oz)   SpO2 98%   BMI 32.39 kg/m²     Temp:  [98 °F (36.7 °C)-98.2 °F (36.8 °C)] 98.2 °F (36.8 °C)      Intake/Output Summary (Last 24 hours) at 08/25/18 1110  Last data filed at 08/25/18 0316   Gross per 24 hour   Intake              580 ml   Output                0 ml   Net              580 ml     Intake & Output (last 3 days)       08/22 0701 - 08/23 0700 08/23 0701 - 08/24 0700 08/24 0701 - 08/25 0700 08/25 0701 - 08/26 0700    P.O.  1080 960     I.V. (mL/kg)  1000 (9.5)      IV Piggyback 250 500 200     Total Intake(mL/kg) 250 (2.4) 2580 (24.6) 1160 (11)     Net +250 +2580 +1160              Unmeasured Urine Occurrence 2 x 7 x 3 x     Unmeasured Stool Occurrence 0 x 1 x            Objective    General Appearance:    Alert, cooperative, in no acute distress, uncomfortable   Head:    Normocephalic, without obvious abnormality, atraumatic   Eyes:            Lids and lashes normal, conjunctivae and sclerae normal, no   icterus, no pallor, corneas clear, PERRLA   Ears:    Ears appear intact with no abnormalities noted   Throat:   No oral lesions, no thrush, oral mucosa moist   Neck:   No adenopathy, supple, trachea midline, no thyromegaly, no   carotid bruit, no JVD   Back:     No tenderness to percussion or palpation, range of motion   normal   Lungs:     Clear to auscultation,respirations regular, even and unlabored. No wheezing, no ronchi and no crackles.    Heart:    Regular rhythm and " normal rate, normal S1 and S2, no            murmur, no gallop, no rub, no click   Chest Wall:    No abnormalities observed   Abdomen:     Normal bowel sounds, no masses, no organomegaly, soft        less tender, non-distended, no guarding, no rebound        tenderness   Rectal:     Deferred   Extremities:   Moves all extremities well, no edema, no cyanosis, no             redness   Pulses:   Pulses palpable and equal bilaterally   Skin:   No bleeding, bruising or rash   Lymph nodes:   No palpable adenopathy   Neurologic:   Awake, alert and oriented x 3. Following commands.           Results:      Results from last 7 days  Lab Units 08/25/18  0401 08/24/18  0045 08/23/18  0116 08/21/18  2306   WBC 10*3/mm3 5.53 3.98* 6.77 11.04     Lab Results   Component Value Date    NEUTROABS 2.46 08/25/2018         Results from last 7 days  Lab Units 08/25/18  0401   CREATININE mg/dL 1.31*         Results from last 7 days  Lab Units 08/25/18  0401 08/22/18  0234   CRP mg/dL 2.88* 1.99*     Results Review:    I have personally reviewed laboratory data, culture results, radiology studies and antimicrobial therapy.    Hospital Medications (active)       Dose Frequency Start End    albuterol (PROVENTIL) nebulizer solution 0.083% 2.5 mg/3mL 2.5 mg Every 6 Hours PRN 8/22/2018     Sig - Route: Take 2.5 mg by nebulization Every 6 (Six) Hours As Needed for Wheezing or Shortness of Air. - Nebulization    budesonide (PULMICORT) nebulizer solution 0.5 mg 0.5 mg 2 Times Daily - RT 8/22/2018     Sig - Route: Take 2 mL by nebulization 2 (Two) Times a Day. - Nebulization    cefepime (MAXIPIME) 2 g/100 mL 0.9% NS (mbp) 2 g Once 8/23/2018 8/23/2018    Sig - Route: Infuse 100 mL into a venous catheter 1 (One) Time. - Intravenous    cefTRIAXone (ROCEPHIN) 2 g/100 mL 0.9% NS VTB (ANGELIQUE) 2 g Every 24 Hours 8/24/2018 8/31/2018    Sig - Route: Infuse 100 mL into a venous catheter Daily. - Intravenous    Cosign for Ordering: Required by Davin Faye  "MD Keith    cholecalciferol (VITAMIN D3) tablet 1,000 Units 1,000 Units Daily 8/22/2018     Sig - Route: Take 2.5 tablets by mouth Daily. - Oral    diltiaZEM (CARDIZEM) 125 mg in sodium chloride 0.9 % 125 mL (1 mg/mL) infusion 5-15 mg/hr Titrated 8/24/2018     Sig - Route: Infuse 5-15 mg/hr into a venous catheter Dose Adjusted By Provider As Needed. - Intravenous    diltiazem (CARDIZEM) bolus from bag 1 mg/mL 10 mg 10 mg Once 8/24/2018     Sig - Route: Infuse 10 mg into a venous catheter 1 (One) Time. - Intravenous    flecainide (TAMBOCOR) tablet 50 mg 50 mg Every 12 Hours Scheduled 8/22/2018     Sig - Route: Take 1 tablet by mouth Every 12 (Twelve) Hours. - Oral    FLUoxetine (PROzac) capsule 20 mg 20 mg Nightly 8/22/2018     Sig - Route: Take 1 capsule by mouth Every Night. - Oral    FLUoxetine (PROzac) capsule 40 mg 40 mg Every Morning 8/23/2018     Sig - Route: Take 2 capsules by mouth Every Morning. - Oral    HYDROcodone-acetaminophen (NORCO) 5-325 MG per tablet 1 tablet 1 tablet Every 6 Hours PRN 8/23/2018 9/2/2018    Sig - Route: Take 1 tablet by mouth Every 6 (Six) Hours As Needed for Moderate Pain . - Oral    losartan (COZAAR) tablet 25 mg 25 mg Daily 8/22/2018     Sig - Route: Take 1 tablet by mouth Daily. - Oral    metoprolol tartrate (LOPRESSOR) tablet 12.5 mg 12.5 mg Every 12 Hours Scheduled 8/22/2018     Sig - Route: Take 0.5 tablets by mouth Every 12 (Twelve) Hours. - Oral    metroNIDAZOLE (FLAGYL) IVPB 500 mg 500 mg Every 8 Hours 8/23/2018 9/2/2018    Sig - Route: Infuse 100 mL into a venous catheter Every 8 (Eight) Hours. - Intravenous    montelukast (SINGULAIR) tablet 10 mg 10 mg Nightly 8/22/2018     Sig - Route: Take 1 tablet by mouth Every Night. - Oral    naloxone (NARCAN) injection 0.4 mg 0.4 mg Every 5 Minutes PRN 8/22/2018     Sig - Route: Infuse 1 mL into a venous catheter Every 5 (Five) Minutes As Needed for Respiratory Depression. - Intravenous    Linked Group 1:  \"And\" Linked Group " "Details        nitroglycerin (NITROSTAT) SL tablet 0.4 mg 0.4 mg As Needed 8/22/2018     Sig - Route: Place 1 tablet under the tongue As Needed for Chest Pain. - Sublingual    ondansetron (ZOFRAN) tablet 4 mg 4 mg Every 4 Hours PRN 8/22/2018     Sig - Route: Take 1 tablet by mouth Every 4 (Four) Hours As Needed for Nausea or Vomiting. - Oral    oxyCODONE (oxyCONTIN) 12 hr tablet 10 mg 10 mg Every 12 Hours Scheduled 8/23/2018 9/2/2018    Sig - Route: Take 1 tablet by mouth Every 12 (Twelve) Hours. - Oral    pantoprazole (PROTONIX) EC tablet 40 mg 40 mg Every Morning 8/23/2018     Sig - Route: Take 1 tablet by mouth Every Morning. - Oral    Pharmacy Consult - Pharmacy to dose  Continuous PRN 8/24/2018     Sig - Route: Continuous As Needed for Consult. - Does not apply    pioglitazone (ACTOS) tablet 30 mg 30 mg Daily 8/22/2018     Sig - Route: Take 2 tablets by mouth Daily. - Oral    polyethylene glycol (MIRALAX) powder 17 g 17 g Daily PRN 8/22/2018     Sig - Route: Take 17 g by mouth Daily As Needed for Constipation. - Oral    sodium chloride 0.9 % flush 1-10 mL 1-10 mL As Needed 8/22/2018     Sig - Route: Infuse 1-10 mL into a venous catheter As Needed for Line Care. - Intravenous    sodium chloride 0.9 % flush 10 mL 10 mL As Needed 8/21/2018     Sig - Route: Infuse 10 mL into a venous catheter As Needed for Line Care. - Intravenous    Cosign for Ordering: Accepted by Tyree Carballo MD on 8/22/2018  2:42 AM    Linked Group 2:  \"And\" Linked Group Details        sodium chloride 0.9 % with KCl 20 mEq/L infusion 100 mL/hr Continuous 8/22/2018     Sig - Route: Infuse 100 mL/hr into a venous catheter Continuous. - Intravenous    tiotropium bromide-olodaterol (STIOLTO RESPIMAT) 2.5-2.5 MCG/ACT inhaler 2 puff 2 puff Nightly 8/22/2018     Sig - Route: Inhale 2 puffs Every Night. - Inhalation    Non-formulary Exception Code: Patient supplied medication    cefepime (MAXIPIME) 2 g/100 mL 0.9% NS (mbp) (Discontinued) 2 g " Every 12 Hours 8/23/2018 8/24/2018    Sig - Route: Infuse 100 mL into a venous catheter Every 12 (Twelve) Hours. - Intravenous    colchicine tablet 0.6 mg (Discontinued) 0.6 mg Daily 8/22/2018 8/24/2018    Sig - Route: Take 1 tablet by mouth Daily. - Oral    HYDROcodone-acetaminophen (NORCO) 5-325 MG per tablet 1 tablet (Discontinued) 1 tablet Every 4 Hours PRN 8/22/2018 8/23/2018    Sig - Route: Take 1 tablet by mouth Every 4 (Four) Hours As Needed for Moderate Pain . - Oral    HYDROmorphone (DILAUDID) injection 1 mg (Discontinued) 1 mg Every 2 Hours PRN 8/22/2018 8/23/2018    Sig - Route: Infuse 1 mL into a venous catheter Every 2 (Two) Hours As Needed for Severe Pain . - Intravenous    Morphine (MS CONTIN) 12 hr tablet 15 mg (Discontinued) 15 mg Every 12 Hours Scheduled 8/23/2018 8/23/2018    Sig - Route: Take 1 tablet by mouth Every 12 (Twelve) Hours. - Oral            Cultures:    Blood Culture   Date Value Ref Range Status   08/23/2018 No growth at less than 24 hours  Preliminary   08/23/2018 No growth at less than 24 hours  Preliminary   08/21/2018 Klebsiella pneumoniae (A)  Final   08/21/2018 Klebsiella pneumoniae (A)  Final         ASSESSMENT/PLAN           Assessment/Plan     ASSESSMENT:    1.  Severe sepsis and lactic acid greater than 2 on admission  2.  Bacteremia     PLAN:     The patient is feeling better today with less pain today.  Repeat blood culture showing no growth so far.  Fever or diarrhea reported.    Blood cultures ×2 sets from 8/21/18 finalized with Klebsiella fairly susceptible and only resistant to ampicillin.  Based on culture results antibiotic therapy was de-escalated on 8/24/18 from cefepime to Rocephin 2 g IV every 24 hours and Flagyl then further de-escalated to Omnicef 300 mg by mouth twice a day and Flagyl 500 mg by mouth 3 times a day to continue for 10 day course through 9/3/18 as long as repeat blood cultures remain negative.      Patient's findings and recommendations were  discussed with patient and nursing staff    Code Status:   Code Status and Medical Interventions:   Ordered at: 08/22/18 1525     Level Of Support Discussed With:    Patient     Code Status:    CPR     Medical Interventions (Level of Support Prior to Arrest):    Full       Tamera Schwab PA-C  08/25/18  11:10 AM

## 2018-08-25 NOTE — SIGNIFICANT NOTE
Pt requested treatment, albuterol given pulmicort given after.       Pt has been unable to bring inhaler from home, spoke to pharmacy and a substitute has been ordered.

## 2018-08-25 NOTE — PHARMACY PATIENT ASSISTANCE
Pharmacy checked on cost of Eliquis for this patient. It is a [facility], and the patient reports he is not charged for it.  Gunjan Munoz [facility]

## 2018-08-26 ENCOUNTER — READMISSION MANAGEMENT (OUTPATIENT)
Dept: CALL CENTER | Facility: HOSPITAL | Age: 68
End: 2018-08-26

## 2018-08-26 NOTE — OUTREACH NOTE
Prep Survey      Responses   Facility patient discharged from?  [facility]   Is patient eligible?  Yes   Discharge diagnosis  Sepsis   Does the patient have one of the following disease processes/diagnoses(primary or secondary)?  Sepsis   Does the patient have Home health ordered?  No   Is there a DME ordered?  No   Prep survey completed?  Yes          Zuleyma Penn RN

## 2018-08-27 ENCOUNTER — READMISSION MANAGEMENT (OUTPATIENT)
Dept: CALL CENTER | Facility: HOSPITAL | Age: 68
End: 2018-08-27

## 2018-08-27 ENCOUNTER — PREP FOR SURGERY (OUTPATIENT)
Dept: OTHER | Facility: HOSPITAL | Age: 68
End: 2018-08-27

## 2018-08-27 RX ORDER — MORPHINE SULFATE 15 MG/1
15 TABLET, FILM COATED, EXTENDED RELEASE ORAL EVERY 12 HOURS SCHEDULED
Qty: 14 TABLET | Refills: 0 | Status: SHIPPED | OUTPATIENT
Start: 2018-08-27 | End: 2018-09-03

## 2018-08-27 NOTE — OUTREACH NOTE
Sepsis Week 1 Survey      Responses   Facility patient discharged from?  [facility]   Does the patient have one of the following disease processes/diagnoses(primary or secondary)?  Sepsis   Is there a successful TCM telephone encounter documented?  No   Week 1 attempt successful?  Yes   Call start time  1753   Call end time  1801   Discharge diagnosis  Sepsis   Is patient permission given to speak with other caregiver?  No   Meds reviewed with patient/caregiver?  Yes   Is the patient having any side effects they believe may be caused by any medication additions or changes?  No   Does the patient have all medications related to this admission filled (includes all antibiotics, inhalers, nebulizers,steroids,etc.)  Yes   Is the patient taking all medications as directed (includes completed medication regime)?  Yes   Comments regarding appointments  Patient states that he is going to [place] on Wednesday 8/29/18 to see surgeon regarding pancreatic mass.    Does the patient have a primary care provider?   Yes   Comments regarding PCP  Dr Downs, one week   Does the patient have an appointment with their PCP within 7 days of discharge?  No   Nursing Interventions  Advised patient to make appointment, Educated patient on importance of making appointment   Has the patient kept scheduled appointments due by today?  N/A   Has home health visited the patient within 72 hours of discharge?  N/A   Psychosocial issues?  No   Did the patient receive a copy of their discharge instructions?  Yes   Nursing interventions  Reviewed instructions with patient   What is the patient's perception of their health status since discharge?  Same   Nursing interventions  Nurse provided patient education, Advised patient to call provider   Is the patient/caregiver able to teach back Sepsis?  S - Shivering,fever or very cold, E - Extreme pain or generalized discomfort (worst ever,especially abdomen), P - Pale or discolored skin, S - Sleepy, difficult  to arouse,confused, I -   I feel like I might die-a feeling of hopelessness, S - Short of breath   Nursing interventions  Nurse provided reassurance to patient, Nurse provided patient education   Is patient/caregiver able to teach back steps to recovery at home?  Rest and regain strength, Eat a balanced diet   Is the patient/caregiver able to teach back signs and symptoms of worsening condition:  Fever   Is the patient/caregiver able to teach back the hierarchy of who to call/visit for symptoms/problems? PCP, Specialist, Home health nurse, Urgent Care, ED, 911  Yes   Week 1 call completed?  Yes          Tresa Mejía RN

## 2018-08-28 LAB
BACTERIA SPEC AEROBE CULT: NORMAL
BACTERIA SPEC AEROBE CULT: NORMAL

## 2018-08-29 ENCOUNTER — DOCUMENTATION (OUTPATIENT)
Dept: OTHER | Facility: HOSPITAL | Age: 68
End: 2018-08-29

## 2018-08-29 NOTE — PROGRESS NOTES
I saw patient with Dr. Brody in [facility] today. Patient's CA 19-9 is elevated and mass was seen on imaging. Patient is having abdominal pain and back pain. Dr. Brody thoroughly spoke with pt/spouse and daughter about treatment plans. Dr. Brody talked with Dr. Alves, Medical Oncologist, in [place], [place] about patient. Dr. Brody requested that patient be scheduled for EUS with Dr. Browning as soon as possible for path for treatment purposes. Plans are for patient to have CT abdomen with pancreas protocol and get EUS for treatment with chemotherapy. I called Dr. Browning's office and got a recording. I emailed Sallie Alejandre, . I will follow up with her tomorrow. Dr. Brody also wrote a prescription for Morphine 15mg ER po Q 12 hours and I gave the prescription to patient's spouse. I also provided patient with educational packet with [facility] information, post-operative instructions and a list of Dr. Brody's Surgical Team. I gave patient my contact information and encouraged patient to call me so that I could navigate them through the [facility]. AG

## 2018-08-30 ENCOUNTER — DOCUMENTATION (OUTPATIENT)
Dept: OTHER | Facility: HOSPITAL | Age: 68
End: 2018-08-30

## 2018-08-30 NOTE — PROGRESS NOTES
I went over to [facility] office and spoke with Olya and Sallie. Olya will be the one to schedule the EUS procedures for Dr. Browning. Olya said that Sallie forwarded her this patient for EUS and she was going to work on time/date and send me an e-mail when patient was scheduled. AG

## 2018-08-31 ENCOUNTER — PREP FOR SURGERY (OUTPATIENT)
Dept: OTHER | Facility: HOSPITAL | Age: 68
End: 2018-08-31

## 2018-08-31 ENCOUNTER — DOCUMENTATION (OUTPATIENT)
Dept: OTHER | Facility: HOSPITAL | Age: 68
End: 2018-08-31

## 2018-08-31 DIAGNOSIS — C25.0 MALIGNANT NEOPLASM OF HEAD OF PANCREAS (HCC): Primary | ICD-10-CM

## 2018-09-05 ENCOUNTER — READMISSION MANAGEMENT (OUTPATIENT)
Dept: CALL CENTER | Facility: HOSPITAL | Age: 68
End: 2018-09-05

## 2018-09-05 PROBLEM — C25.0 MALIGNANT NEOPLASM OF HEAD OF PANCREAS (HCC): Status: ACTIVE | Noted: 2018-09-05

## 2018-09-05 NOTE — OUTREACH NOTE
Sepsis Week 2 Survey      Responses   Facility patient discharged from?  [facility]   Does the patient have one of the following disease processes/diagnoses(primary or secondary)?  Sepsis   Week 2 attempt successful?  Yes   Call start time  1531   Call end time  1537   Discharge diagnosis  Sepsis   Meds reviewed with patient/caregiver?  Yes   Is the patient having any side effects they believe may be caused by any medication additions or changes?  No   Does the patient have all medications related to this admission filled (includes all antibiotics, inhalers, nebulizers,steroids,etc.)  Yes   Is the patient taking all medications as directed (includes completed medication regime)?  Yes   Medication comments  Will complete last antibiotic today. Also on morphine. He is running out out pain meds. Advised to call MD PRN for refills.   Does the patient have a primary care provider?   Yes   Does the patient have an appointment with their PCP within 7 days of discharge?  Yes   Has the patient kept scheduled appointments due by today?  Yes   Comments  GI procedure on the 14th.   Has home health visited the patient within 72 hours of discharge?  N/A   Psychosocial issues?  No   Comments  No major complaints except for pain. He perceives that he is about the same as before.   Did the patient receive a copy of their discharge instructions?  Yes   Nursing interventions  Reviewed instructions with patient   What is the patient's perception of their health status since discharge?  Same   Nursing interventions  Nurse provided patient education, Advised patient to call provider   Is the patient/caregiver able to teach back Sepsis?  S - Shivering,fever or very cold, E - Extreme pain or generalized discomfort (worst ever,especially abdomen), P - Pale or discolored skin, S - Sleepy, difficult to arouse,confused, I -   I feel like I might die-a feeling of hopelessness, S - Short of breath   Nursing interventions  Nurse provided reassurance  to patient, Nurse provided patient education   Is patient/caregiver able to teach back steps to recovery at home?  Rest and regain strength, Eat a balanced diet   Is the patient/caregiver able to teach back signs and symptoms of worsening condition:  Fever, Rapid heart rate (>90), Shortness of breath/rapid respiratory rate, Altered mental status(confusion/coma)   Is the patient/caregiver able to teach back the hierarchy of who to call/visit for symptoms/problems? PCP, Specialist, Home health nurse, Urgent Care, ED, 911  Yes   Week 2 call completed?  Yes          Marco Antonio Barker RN

## 2018-09-06 ENCOUNTER — DOCUMENTATION (OUTPATIENT)
Dept: OTHER | Facility: HOSPITAL | Age: 68
End: 2018-09-06

## 2018-09-06 NOTE — PROGRESS NOTES
Patient's daughter, Alena([phone]), called me today saying that patient is taking Morphine ER 15mg Q 12 hours and now having to take norco 5/325mg, 1/2 tab every 6 hours PRN for breakthrough pain. Daughter said that patient needs a refill on Isabella and we could mail her the prescription. Patient says that his pain level is a 3-4 after taking norco. Patient is very fatigued and in bed most of the time. I asked daughter if patient had a fever, change in color or dark urine and daughter checked with spouse and said no to all three. I asked if I could make appointment for pt with Dr. Brody tomorrow and family said that patient does not want to drive that far. I suggested that Alena take patient to [facility] for temp, change in color or dark urine or increase in pain and she verbalized understanding. I asked daughter if she thought that patient needed to go to ER now and she asked her father, but he does not want to go right now. I told daughter that Mary Grace was in surgery, but that I would send him a message. I did sent Mary Grace a message. I told Alena that I would check with her regarding her father in the am and she agreed to take patient to the ER as we discussed above. AG

## 2018-09-07 ENCOUNTER — DOCUMENTATION (OUTPATIENT)
Dept: OTHER | Facility: HOSPITAL | Age: 68
End: 2018-09-07

## 2018-09-07 ENCOUNTER — CONSULT (OUTPATIENT)
Dept: ONCOLOGY | Facility: CLINIC | Age: 68
End: 2018-09-07

## 2018-09-07 VITALS
HEIGHT: 72 IN | HEART RATE: 76 BPM | TEMPERATURE: 97.4 F | DIASTOLIC BLOOD PRESSURE: 81 MMHG | OXYGEN SATURATION: 98 % | SYSTOLIC BLOOD PRESSURE: 115 MMHG | BODY MASS INDEX: 30.2 KG/M2 | WEIGHT: 223 LBS | RESPIRATION RATE: 20 BRPM

## 2018-09-07 DIAGNOSIS — C25.0 MALIGNANT NEOPLASM OF HEAD OF PANCREAS (HCC): Primary | ICD-10-CM

## 2018-09-07 DIAGNOSIS — R10.9 ABDOMINAL PAIN, UNSPECIFIED ABDOMINAL LOCATION: ICD-10-CM

## 2018-09-07 DIAGNOSIS — R97.8 ELEVATED CA 19-9 LEVEL: ICD-10-CM

## 2018-09-07 DIAGNOSIS — G89.3 NEOPLASM RELATED PAIN: ICD-10-CM

## 2018-09-07 DIAGNOSIS — R53.83 FATIGUE, UNSPECIFIED TYPE: ICD-10-CM

## 2018-09-07 DIAGNOSIS — N18.30 CKD (CHRONIC KIDNEY DISEASE), STAGE III (HCC): ICD-10-CM

## 2018-09-07 LAB
ALBUMIN SERPL-MCNC: 4.7 G/DL (ref 3.4–4.8)
ALBUMIN/GLOB SERPL: 1.4 G/DL (ref 1.5–2.5)
ALP SERPL-CCNC: 155 U/L (ref 40–129)
ALT SERPL W P-5'-P-CCNC: 51 U/L (ref 10–44)
ANION GAP SERPL CALCULATED.3IONS-SCNC: 4.5 MMOL/L (ref 3.6–11.2)
AST SERPL-CCNC: 53 U/L (ref 10–34)
BASOPHILS # BLD AUTO: 0.03 10*3/MM3 (ref 0–0.3)
BASOPHILS NFR BLD AUTO: 0.4 % (ref 0–2)
BILIRUB SERPL-MCNC: 0.8 MG/DL (ref 0.2–1.8)
BUN BLD-MCNC: 25 MG/DL (ref 7–21)
BUN/CREAT SERPL: 15.9 (ref 7–25)
CALCIUM SPEC-SCNC: 10.2 MG/DL (ref 7.7–10)
CEA SERPL-MCNC: 3.8 NG/ML (ref 0–5)
CHLORIDE SERPL-SCNC: 102 MMOL/L (ref 99–112)
CO2 SERPL-SCNC: 28.5 MMOL/L (ref 24.3–31.9)
CREAT BLD-MCNC: 1.57 MG/DL (ref 0.43–1.29)
DEPRECATED RDW RBC AUTO: 44.5 FL (ref 37–54)
EOSINOPHIL # BLD AUTO: 0.19 10*3/MM3 (ref 0–0.7)
EOSINOPHIL NFR BLD AUTO: 2.6 % (ref 0–7)
ERYTHROCYTE [DISTWIDTH] IN BLOOD BY AUTOMATED COUNT: 13.8 % (ref 11.5–14.5)
FERRITIN SERPL-MCNC: 367 NG/ML (ref 21.9–321.7)
FOLATE SERPL-MCNC: 22.81 NG/ML (ref 5.4–20)
GFR SERPL CREATININE-BSD FRML MDRD: 44 ML/MIN/1.73
GLOBULIN UR ELPH-MCNC: 3.3 GM/DL
GLUCOSE BLD-MCNC: 126 MG/DL (ref 70–110)
HCT VFR BLD AUTO: 43.6 % (ref 42–52)
HGB BLD-MCNC: 14.5 G/DL (ref 14–18)
IMM GRANULOCYTES # BLD: 0.05 10*3/MM3 (ref 0–0.03)
IMM GRANULOCYTES NFR BLD: 0.7 % (ref 0–0.5)
IRON 24H UR-MRATE: 93 MCG/DL (ref 53–167)
IRON SATN MFR SERPL: 31 % (ref 20–50)
LYMPHOCYTES # BLD AUTO: 1.85 10*3/MM3 (ref 1–3)
LYMPHOCYTES NFR BLD AUTO: 25.8 % (ref 16–46)
MCH RBC QN AUTO: 29.8 PG (ref 27–33)
MCHC RBC AUTO-ENTMCNC: 33.3 G/DL (ref 33–37)
MCV RBC AUTO: 89.5 FL (ref 80–94)
MONOCYTES # BLD AUTO: 0.7 10*3/MM3 (ref 0.1–0.9)
MONOCYTES NFR BLD AUTO: 9.7 % (ref 0–12)
NEUTROPHILS # BLD AUTO: 4.36 10*3/MM3 (ref 1.4–6.5)
NEUTROPHILS NFR BLD AUTO: 60.8 % (ref 40–75)
OSMOLALITY SERPL CALC.SUM OF ELEC: 276 MOSM/KG (ref 273–305)
PLATELET # BLD AUTO: 248 10*3/MM3 (ref 130–400)
PMV BLD AUTO: 10.3 FL (ref 6–10)
POTASSIUM BLD-SCNC: 4.8 MMOL/L (ref 3.5–5.3)
PROT SERPL-MCNC: 8 G/DL (ref 6–8)
RBC # BLD AUTO: 4.87 10*6/MM3 (ref 4.7–6.1)
SODIUM BLD-SCNC: 135 MMOL/L (ref 135–153)
TIBC SERPL-MCNC: 297 MCG/DL (ref 241–421)
TSH SERPL DL<=0.05 MIU/L-ACNC: 1.81 MIU/ML (ref 0.55–4.78)
VIT B12 BLD-MCNC: 677 PG/ML (ref 211–911)
WBC NRBC COR # BLD: 7.18 10*3/MM3 (ref 4.5–12.5)

## 2018-09-07 PROCEDURE — 82728 ASSAY OF FERRITIN: CPT | Performed by: INTERNAL MEDICINE

## 2018-09-07 PROCEDURE — 99205 OFFICE O/P NEW HI 60 MIN: CPT | Performed by: INTERNAL MEDICINE

## 2018-09-07 PROCEDURE — 85025 COMPLETE CBC W/AUTO DIFF WBC: CPT | Performed by: INTERNAL MEDICINE

## 2018-09-07 PROCEDURE — 80053 COMPREHEN METABOLIC PANEL: CPT | Performed by: INTERNAL MEDICINE

## 2018-09-07 PROCEDURE — 82378 CARCINOEMBRYONIC ANTIGEN: CPT | Performed by: INTERNAL MEDICINE

## 2018-09-07 PROCEDURE — 86301 IMMUNOASSAY TUMOR CA 19-9: CPT | Performed by: INTERNAL MEDICINE

## 2018-09-07 PROCEDURE — 82607 VITAMIN B-12: CPT | Performed by: INTERNAL MEDICINE

## 2018-09-07 PROCEDURE — 83540 ASSAY OF IRON: CPT | Performed by: INTERNAL MEDICINE

## 2018-09-07 PROCEDURE — 83550 IRON BINDING TEST: CPT | Performed by: INTERNAL MEDICINE

## 2018-09-07 PROCEDURE — 82746 ASSAY OF FOLIC ACID SERUM: CPT | Performed by: INTERNAL MEDICINE

## 2018-09-07 PROCEDURE — 84443 ASSAY THYROID STIM HORMONE: CPT | Performed by: INTERNAL MEDICINE

## 2018-09-07 RX ORDER — OXYCODONE HYDROCHLORIDE 5 MG/1
TABLET ORAL
Qty: 90 TABLET | Refills: 0 | Status: ON HOLD | OUTPATIENT
Start: 2018-09-07 | End: 2018-09-12

## 2018-09-07 RX ORDER — HYDROCODONE BITARTRATE AND ACETAMINOPHEN 5; 325 MG/1; MG/1
1 TABLET ORAL EVERY 6 HOURS PRN
Status: ON HOLD | COMMUNITY
End: 2018-09-12

## 2018-09-07 RX ORDER — MORPHINE SULFATE 15 MG/1
15 TABLET ORAL EVERY 12 HOURS
Status: ON HOLD | COMMUNITY
End: 2018-09-12

## 2018-09-07 RX ORDER — MORPHINE SULFATE 30 MG/1
30 TABLET, FILM COATED, EXTENDED RELEASE ORAL EVERY 12 HOURS SCHEDULED
Qty: 60 TABLET | Refills: 0 | Status: SHIPPED | OUTPATIENT
Start: 2018-09-07 | End: 2018-10-19 | Stop reason: SDUPTHER

## 2018-09-07 NOTE — PROGRESS NOTES
Keya from Dr. Alves office called and said that MD would see patient today at 1:30pm. I called patient and spoke with spouse and notified her of time,date and location of patient's appointment today. BUFFY

## 2018-09-07 NOTE — PROGRESS NOTES
DATE OF CONSULTATION:  9/7/2018    REASON FOR REFERRAL: Suspected pancreatic malignancy, not pathologically confirmed.     REFERRING PHYSICIAN:  Dr. Miquel Brody     CHIEF COMPLAINT:  Abdominal pain       HISTORY OF PRESENT ILLNESS:   Todd Phillips is a very pleasant 68 y.o. male who is being seen today at the request of Dr. Miquel Brody for evaluation and treatment of pancreatic cancer.  Mr. Phillips initially developed symptoms in January of 2018. At that time he had pain in his upper abdomen which would radiate to his back.  In July, the pain intensified.  He was seen in the ER and also by Dr. Su.  He underwent RUQ U/S and then HIDA scan and it was determined he had a dysfunctional gallbladder.  He saw Dr. Downs and had cholecystectomy on 8-10-18.  Pathology showed chronic cholecystitis and an incidental benign lymph node.  He re-presented in the post-operative period with jaundice.  Dr. Su performed ERCP on8-14-18.  He was noted to have a 3 cm irregular tight stricture of the distal common bile duct with proximal biliary ductal dilatation.  Biliary papillotomy and stricture dilatation performed and brushings taken.  A 10 Fr x 5 cm biliary stent was placed.  Brushings were taken but were negative.  There was sl dilatation of the distal pancreatic duct without discrete stricture.   He was discharged with improving jaundice on 8-16.  On 8-21 he represented with sepsis due to Klebsiella pneumonia bacteremia and was admitted.  He then was referred to Dr. Brody for evaluation and treatment.  He has been set up for EUS with biopsy next Friday for what appears to be a primary pancreatic malignancy in the head of the pancreas.  Dr. Brody has referred him for consideration of neoadjuvant therapy.    Mr. Phillips complains today of pain which is not controlled.  He has been taking Morphine 15 mg q12h and norco 5  I tab q6h.  He isn't sleeping because of the pain.   He previously struggled with abdominal pain  but has had diarrhea after every meal since his cholecystectomy.    PAST MEDICAL HISTORY:  Past Medical History:   Diagnosis Date   • Abnormal ECG    • Antral gastritis    • Atrial fibrillation (CMS/HCC)     treated with oral blood thinner   • Chest pain    • COPD (chronic obstructive pulmonary disease) (CMS/[facility])    • Coronary artery disease     no stents   • Diabetes mellitus (CMS/HCC)     type 2    • Duodenitis    • Emphysema of lung (CMS/HCC)    • Gallbladder disease     removed    • GERD (gastroesophageal reflux disease)    • Hyperlipidemia    • Hypertension    • Kidney stone    • Kidney stones     had lithotripsy and passed 36 kidney stones as well as had one surgically removed.   • Malignant neoplasm of head of pancreas (CMS/[facility]) 9/5/2018   • Palpitations    • Reflux esophagitis    • Renal failure     stage 3 kidney disease       PAST SURGICAL HISTORY:  Past Surgical History:   Procedure Laterality Date   • CARDIAC CATHETERIZATION  11/01/1999   • CARDIOVASCULAR STRESS TEST  09/2012   • CHOLECYSTECTOMY N/A 8/10/2018    Procedure: CHOLECYSTECTOMY LAPAROSCOPIC;  Surgeon: Ronak Downs MD;  Location: [facility];  Service: General   • CYSTOSCOPY BLADDER STONE LITHOTRIPSY     • ECHO - CONVERTED  09/2012   • ERCP N/A 8/14/2018    Procedure: ENDOSCOPIC RETROGRADE CHOLANGIOPANCREATOGRAPHY WITH PAPILLOTOMY;  Surgeon: Scot Su MD;  Location: [facility];  Service: Gastroenterology   • KIDNEY STONE SURGERY     • KIDNEY STONE SURGERY      open abdominal surgery   • UPPER GASTROINTESTINAL ENDOSCOPY  08/30/2012       FAMILY HISTORY:  Family History   Problem Relation Age of Onset   • Heart attack Mother    • Heart disease Mother    • Stroke Mother    • Kidney disease Mother    • Heart failure Mother    • Lung disease Father    • Tuberculosis Father    • Diabetes Sister    • Heart attack Brother    • Heart failure Brother    • Heart disease Brother    • Diabetes Sister    • No Known Problems Brother    • No  Known Problems Brother        SOCIAL HISTORY:  Social History     Social History   • Marital status:      Spouse name: N/A   • Number of children: N/A   • Years of education: N/A     Occupational History   • Not on file.     Social History Main Topics   • Smoking status: Never Smoker   • Smokeless tobacco: Never Used   • Alcohol use No   • Drug use: No   • Sexual activity: Defer     Other Topics Concern   • Not on file     Social History Narrative    He is  and lives alone with his wife although they have 3 children together who all live close by. He is retired from the [employer] and was exposed to Agent Orange during [place]. He is a lifelong nonsmoker.         A DIL  of cancer.      REVIEW OF SYSTEMS:   A comprehensive 14 point review of systems was performed.  Significant findings as mentioned above.  All other systems reviewed and are negative.      MEDICATIONS:  The current medication list was reviewed in the EMR    Current Outpatient Prescriptions:   •  albuterol (PROVENTIL HFA;VENTOLIN HFA) 108 (90 BASE) MCG/ACT inhaler, Inhale 2 puffs Every 6 (Six) Hours As Needed. EVERY 4-6 HOURS , Disp: , Rfl:   •  apixaban (ELIQUIS) 5 MG tablet tablet, Take 1 tablet by mouth Every 12 (Twelve) Hours., Disp: 60 tablet, Rfl: 0  •  atorvastatin (LIPITOR) 10 MG tablet, Take 5 mg by mouth daily., Disp: , Rfl:   •  cholecalciferol (VITAMIN D3) 1000 units tablet, Take 1,000 Units by mouth Daily., Disp: , Rfl:   •  colchicine 0.6 MG tablet, Take 1 tablet by mouth Daily., Disp: 90 tablet, Rfl: 2  •  flecainide (TAMBOCOR) 50 MG tablet, Take 1 tablet by mouth Every 12 (Twelve) Hours., Disp: 60 tablet, Rfl: 0  •  FLUoxetine (PROzac) 20 MG capsule, Take 20 mg by mouth 3 (Three) Times a Day., Disp: , Rfl:   •  HYDROcodone-acetaminophen (NORCO) 5-325 MG per tablet, Take 1 tablet by mouth Every 6 (Six) Hours As Needed., Disp: , Rfl:   •  lansoprazole (PREVACID) 30 MG capsule, Take 30 mg by mouth Daily., Disp: , Rfl:   •   losartan (COZAAR) 50 MG tablet, Take 0.5 tablets by mouth Daily., Disp: 90 tablet, Rfl: 1  •  metoprolol tartrate (LOPRESSOR) 25 MG tablet, Take 0.5 tablets by mouth Every 12 (Twelve) Hours., Disp: , Rfl:   •  mometasone (ASMANEX) 220 MCG/INH inhaler, Inhale 2 puffs Every Night., Disp: , Rfl:   •  montelukast (SINGULAIR) 10 MG tablet, Take 10 mg by mouth Every Night., Disp: , Rfl:   •  Morphine (MSIR) 15 MG tablet, Take 15 mg by mouth Every 12 (Twelve) Hours., Disp: , Rfl:   •  nitroglycerin (NITROSTAT) 0.4 MG SL tablet, Place 0.4 mg under the tongue as needed for chest pain., Disp: , Rfl:   •  ondansetron (ZOFRAN) 4 MG tablet, Take 1 tablet by mouth Every 4 (Four) Hours As Needed for Nausea or Vomiting., Disp: 30 tablet, Rfl: 0  •  pioglitazone (ACTOS) 30 MG tablet, Take 30 mg by mouth Daily., Disp: , Rfl:   •  polyethylene glycol (MIRALAX) packet, Take 17 g by mouth Daily As Needed., Disp: , Rfl:   •  Tiotropium Bromide-Olodaterol 2.5-2.5 MCG/ACT aerosol solution, Inhale 2 puffs Every Night., Disp: , Rfl:     ALLERGIES:    Allergies   Allergen Reactions   • Contrast Dye Other (See Comments)     Can't have due to kidney failure per family   • Morphine And Related Palpitations     Morphine caused pt to isaac - several years ago         PHYSICAL EXAM:  Vitals:    09/07/18 1339   BP: 115/81   Pulse: 76   Resp: 20   Temp: 97.4 °F (36.3 °C)   SpO2: 98%   General:  Awake, alert and oriented, in no distress  HEENT:  Pupils are equal, round and reactive to light and accommodation, Extra-ocular movements full, Oropharyx clear, mucous membranes moist  Neck:  No JVD, thyromegaly or lymphadenopathy  CV:  Regular rate and rhythm, no murmurs, rubs or gallops  Resp:  Lungs are clear to auscultation bilaterally  Abd:  Soft, somewhat tender to palpation over the epigastric and LUQ areas, non-distended, bowel sounds present, no organomegaly or masses.  Surgical incisions healing nicely.  Ext:  No clubbing, cyanosis or edema  Lymph:   No cervical, supraclavicular, axillary, inguinal or femoral adenopathy  Neuro:  MS as above, CN II-XII intact, grossly non-focal exam      PATHOLOGY:  08-15-18         ENDOSCOPY:  18 ERCP with papillotomy, balloon rotation of the biliary stricture, pathology brushings, ileum stent placement (Georges)  1.  Irregular 3 cm distal common bile duct stricture concerning for neoplastic disease. Biliary papillotomy, stricture dilatation by  through the scope balloon, cytology brushings performed and 10 Amharic by 5 cm biliary stent placed.    2.  Dilated common hepatic duct to 15 mm when fully contrast filled.  Dilated intrahepatic biliary tree.    3.  No stones proximal to the stricture were identified.    4.  Slight dilatation of the distal pancreatic duct without a discrete stricture identified.    IMAGIN18 CT Abdomen Pelvis Stone Protocol   Findings  - LOWER THORAX: Clear. No effusions.  - LIVER: Homogeneous. No focal hepatic mass or ductal dilatation.  - GALLBLADDER: MINIMAL STRANDING AROUND REGION OF GALLBLADDER FOSSA  THAT MAY BE POSTSURGICAL.  - PANCREAS: Unremarkable. No mass or ductal dilatation.  - SPLEEN: Homogeneous. No splenomegaly.  - ADRENALS: No mass.  - KIDNEYS: No mass. No obstructive uropathy.  No evidence of urolithiasis.  - GI TRACT: SMALL HIATAL HERNIA.  - PERITONEUM: No free air. No free fluid or loculated fluid collections.  - MESENTERY: Unremarkable.  - LYMPH NODES: No lymphadenopathy.  - VASCULATURE: ATHEROSCLEROTIC VASCULAR CALCIFICATION.  - ABDOMINAL WALL: No focal hernia or mass.  - OTHER: None.  - BLADDER: No focal mass or significant wall thickening  - REPRODUCTIVE: Unremarkable as visualized.  - APPENDIX: Nondistended. No surrounding inflammation.  - BONES: No acute bony abnormality.     IMPRESSION:  - Minimal stranding around region of gallbladder fossa that may be postsurgical.     18 US Liver   FINDINGS:   - Visualized pancreas is unremarkable.   - The gallbladder is  absent. No collection identified.   - The CBD measures 10.82177358480 mm    .  - The liver demonstrates normal echogenicity without focal lesion.    - No ascites demonstrated.        IMPRESSION:  - As above.    08-22-18 CT Abdomen Pelvis Stone Protocol   FINDINGS:   - Minimal bibasilar atelectasis.  - Hiatal hernia.  - The liver is homogeneous. There is no evidence of focal hepatic mass.  - The spleen is homogeneous.  - Stent is noted traversing the common bile duct.  - 3 mm nodule in the right lung on image 8 of the axial series.  - There is no adrenal enlargement.  - The kidneys show no evidence of hydronephrosis or hydroureter. I do not see any distal ureteral stones.   - Otherwise I do not see any free fluid or walled off fluid collections.  - There is moderate to large volume stool in the colon.  - There is no evidence of mesenteric or retroperitoneal adenopathy.     IMPRESSION:  1. Tiny nodule in the right lung base.  2. Minimal bibasilar atelectasis.  3. Moderate to large volume stool in the colon.     Other findings as above.    08-22-18 CT Abdomen Pelvis With Contrast   FINDINGS:   - Tiny left basilar nodule measuring 4 mm on image 14 of the axial series.  - Minimal bibasilar atelectasis.  - The liver is homogeneous. There is no evidence of focal hepatic mass  - The spleen is homogeneous  - Indistinct appearance of the pancreatic head. A biliary stent is present.  - Small left adrenal nodule is present.  - The kidneys show no evidence of hydronephrosis or hydroureter. I do not see any distal ureteral stones.   - Otherwise I do not see any free fluid or walled off fluid collections.  - Large volume stool is present in the colon.     IMPRESSION:  1. Slightly enlarged, indistinct appearance of the pancreatic head.  - Underlying neoplasm certainly not excluded but no delineable mass is present. There is a biliary stent.    2. Tiny nodule in the left lung base.    3. Small left adrenal nodule.    4. Hiatal  hernia.    5. Moderate to large volume stool in the colon.    RECENT LABS:  Lab Results   Component Value Date    WBC 5.53 08/25/2018    HGB 11.7 (L) 08/25/2018    HCT 35.6 (L) 08/25/2018    MCV 91.3 08/25/2018    RDW 14.0 08/25/2018     08/25/2018    NEUTRORELPCT 44.5 08/25/2018    LYMPHORELPCT 33.8 08/25/2018    MONORELPCT 15.2 (H) 08/25/2018    EOSRELPCT 4.5 08/25/2018    BASORELPCT 0.7 08/25/2018    NEUTROABS 2.46 08/25/2018    LYMPHSABS 1.87 08/25/2018       Lab Results   Component Value Date     08/25/2018    K 4.2 08/25/2018    CO2 22.5 (L) 08/25/2018     08/25/2018    BUN 14 08/25/2018    CREATININE 1.31 (H) 08/25/2018    EGFRIFNONA 54 (L) 08/25/2018    EGFRIFAFRI  09/02/2016      Comment:      <15 Indicative of kidney failure.    GLUCOSE 120 (H) 08/25/2018    CALCIUM 8.8 08/25/2018    ALKPHOS 407 (H) 08/21/2018     (H) 08/21/2018     (H) 08/21/2018    BILITOT 2.3 (H) 08/21/2018    ALBUMIN 4.30 08/21/2018    PROTEINTOT 7.1 08/21/2018    MG 2.0 10/23/2017       Lab Results   Component Value Date/Time    URICACID 8.2 (H) 06/20/2018 11:48 AM     Lab Results   Component Value Date     4032 (H) 08/15/2018         ASSESSMENT & PLAN:  Todd Phillips is a very pleasant 68 y.o. male with presumed cancer of the head of the pancreas with a malignant appearing stricture in the CBD.    1.  Suspected pancreatic cancer:  -  Imaging shows vague abnormality in the head of the pancreas.  Dr. Brody has arranged for EUS/Bx next Friday.  -   is elevated.  -  Will order PET-CT to better evaluate extent of disease.  -  Assuming this is confirmed, I have recommended neoadjuvant chemotherapy with Gemcitabine and Abraxane.  Will refer back to Dr. Downs for PAC placement.    2.  Recent sepsis due to Klebsiella pneumoniae following cholecystectomy.  Treated with Cefdnir and Flagyl.  He has no infectious symptoms currently.    3.  Neoplasm related pain:  Poorly controlled.  Will increase  Morphine ER to 30 mg q12h and will give Oxycodone 5 mg 1-3 tabs po q4h prn pain.  He will keep a log so I can adjust long acting medicaiton as needed.      4.  History of Atrial fibrillation:  Chronically anticoagulated on Eliquis. He knows to make sure his surgeons know he is taking this.  He knows it will need to be held perioperatively.    5.  CKD III:  Will check labwork today. Will need to watch kidney function carefully.    6.  ACO Quality measures  - Todd Phillips does not use tobacco products.  - Patient's Body mass index is 30.24 kg/m². BMI is above normal parameters. Recommendations include: none given current problems.  Will address in the future..  - Current outpatient and discharge medications have been reconciled for the patient.  Reviewed by: Gwen Alves MD     I spent 60 minutes with Todd Phillips today.  More than 50% of the time was spent in counseling / coordination of care for the above problems.      Electronically Signed by: Gewn Alves MD      CC:   MD Miquel Quintana MD Aaron House, MD Michael Simons, MD

## 2018-09-07 NOTE — PROGRESS NOTES
I called Dr. Alves office at 8am and talked with Keya about patient getting in to see Dr. Alves. Keya said that she has been trying to get patient in to see MD, but there was no path report noted. Keya says that she does have Dr. Rosa's H&P. I told Keya that patient's EUS is scheduled for 9/14/2018 with Dr. Browning. I also let Keya know that patient has images and an elevated CA 19-9 of 4032 along with pain issues. Kyea says that she is going to discuss appointment with Dr. Alves and will call me back. I gave her my cell phone number. AG

## 2018-09-08 LAB — CANCER AG19-9 SERPL-ACNC: 3636 U/ML (ref 0–35)

## 2018-09-11 ENCOUNTER — APPOINTMENT (OUTPATIENT)
Dept: CT IMAGING | Facility: HOSPITAL | Age: 68
End: 2018-09-11

## 2018-09-11 ENCOUNTER — HOSPITAL ENCOUNTER (EMERGENCY)
Facility: HOSPITAL | Age: 68
Discharge: HOME | End: 2018-09-11
Attending: FAMILY MEDICINE | Admitting: FAMILY MEDICINE

## 2018-09-11 ENCOUNTER — APPOINTMENT (OUTPATIENT)
Dept: GENERAL RADIOLOGY | Facility: HOSPITAL | Age: 68
End: 2018-09-11

## 2018-09-11 VITALS
SYSTOLIC BLOOD PRESSURE: 113 MMHG | DIASTOLIC BLOOD PRESSURE: 67 MMHG | HEART RATE: 86 BPM | OXYGEN SATURATION: 99 % | HEIGHT: 72 IN | TEMPERATURE: 98.5 F | BODY MASS INDEX: 31.56 KG/M2 | WEIGHT: 233 LBS | RESPIRATION RATE: 18 BRPM

## 2018-09-11 DIAGNOSIS — J18.9 COMMUNITY ACQUIRED PNEUMONIA OF RIGHT LOWER LOBE OF LUNG: Primary | ICD-10-CM

## 2018-09-11 LAB
A-A DO2: 25.6 MMHG (ref 0–300)
A-A DO2: 39.6 MMHG (ref 0–300)
ALBUMIN SERPL-MCNC: 4.2 G/DL (ref 3.4–4.8)
ALBUMIN/GLOB SERPL: 1.4 G/DL (ref 1.5–2.5)
ALP SERPL-CCNC: 216 U/L (ref 40–129)
ALT SERPL W P-5'-P-CCNC: 122 U/L (ref 10–44)
AMYLASE SERPL-CCNC: 67 U/L (ref 28–100)
ANION GAP SERPL CALCULATED.3IONS-SCNC: 8.1 MMOL/L (ref 3.6–11.2)
ARTERIAL PATENCY WRIST A: POSITIVE
ARTERIAL PATENCY WRIST A: POSITIVE
AST SERPL-CCNC: 162 U/L (ref 10–34)
ATMOSPHERIC PRESS: 724 MMHG
ATMOSPHERIC PRESS: 729 MMHG
BACTERIA BLD CULT: ABNORMAL
BACTERIA UR QL AUTO: ABNORMAL /HPF
BASE EXCESS BLDA CALC-SCNC: 0.2 MMOL/L
BASE EXCESS BLDA CALC-SCNC: 2.3 MMOL/L
BASOPHILS # BLD AUTO: 0.01 10*3/MM3 (ref 0–0.3)
BASOPHILS NFR BLD AUTO: 0.1 % (ref 0–2)
BDY SITE: ABNORMAL
BDY SITE: ABNORMAL
BILIRUB SERPL-MCNC: 1.2 MG/DL (ref 0.2–1.8)
BILIRUB UR QL STRIP: ABNORMAL
BODY TEMPERATURE: 98.6 C
BODY TEMPERATURE: 98.6 C
BUN BLD-MCNC: 27 MG/DL (ref 7–21)
BUN/CREAT SERPL: 14.3 (ref 7–25)
CA-I BLD-MCNC: 4.81 MG/DL (ref 4.6–5.3)
CALCIUM SPEC-SCNC: 9.4 MG/DL (ref 7.7–10)
CHLORIDE SERPL-SCNC: 102 MMOL/L (ref 99–112)
CLARITY UR: CLEAR
CO2 SERPL-SCNC: 23.9 MMOL/L (ref 24.3–31.9)
COHGB MFR BLD: 1.4 % (ref 0–5)
COHGB MFR BLD: 1.8 % (ref 0–5)
COLOR UR: ABNORMAL
CREAT BLD-MCNC: 1.89 MG/DL (ref 0.43–1.29)
CRP SERPL-MCNC: <0.5 MG/DL (ref 0–0.99)
D-LACTATE SERPL-SCNC: 1.2 MMOL/L (ref 0.5–2)
DEPRECATED RDW RBC AUTO: 42.1 FL (ref 37–54)
EOSINOPHIL # BLD AUTO: 0.03 10*3/MM3 (ref 0–0.7)
EOSINOPHIL NFR BLD AUTO: 0.3 % (ref 0–7)
ERYTHROCYTE [DISTWIDTH] IN BLOOD BY AUTOMATED COUNT: 13.4 % (ref 11.5–14.5)
FLUAV AG NPH QL: NEGATIVE
FLUBV AG NPH QL IA: NEGATIVE
GFR SERPL CREATININE-BSD FRML MDRD: 35 ML/MIN/1.73
GLOBULIN UR ELPH-MCNC: 3.1 GM/DL
GLUCOSE BLD-MCNC: 143 MG/DL (ref 70–110)
GLUCOSE UR STRIP-MCNC: NEGATIVE MG/DL
HCO3 BLDA-SCNC: 24.6 MMOL/L (ref 22–26)
HCO3 BLDA-SCNC: 25.9 MMOL/L (ref 22–26)
HCT VFR BLD AUTO: 39.1 % (ref 42–52)
HCT VFR BLD CALC: 38 % (ref 42–52)
HCT VFR BLD CALC: 41 % (ref 42–52)
HGB BLD-MCNC: 13.2 G/DL (ref 14–18)
HGB BLDA-MCNC: 12.8 G/DL (ref 12–16)
HGB BLDA-MCNC: 13.9 G/DL (ref 12–16)
HGB UR QL STRIP.AUTO: NEGATIVE
HOLD SPECIMEN: NORMAL
HOLD SPECIMEN: NORMAL
HOROWITZ INDEX BLD+IHG-RTO: 21 %
HOROWITZ INDEX BLD+IHG-RTO: 21 %
HYALINE CASTS UR QL AUTO: ABNORMAL /LPF
IMM GRANULOCYTES # BLD: 0.04 10*3/MM3 (ref 0–0.03)
IMM GRANULOCYTES NFR BLD: 0.4 % (ref 0–0.5)
KETONES UR QL STRIP: ABNORMAL
LEUKOCYTE ESTERASE UR QL STRIP.AUTO: ABNORMAL
LIPASE SERPL-CCNC: 26 U/L (ref 13–60)
LYMPHOCYTES # BLD AUTO: 0.54 10*3/MM3 (ref 1–3)
LYMPHOCYTES NFR BLD AUTO: 5.9 % (ref 16–46)
MAGNESIUM SERPL-MCNC: 1.9 MG/DL (ref 1.7–2.6)
MCH RBC QN AUTO: 29.8 PG (ref 27–33)
MCHC RBC AUTO-ENTMCNC: 33.8 G/DL (ref 33–37)
MCV RBC AUTO: 88.3 FL (ref 80–94)
METHGB BLD QL: 0.5 % (ref 0–3)
METHGB BLD QL: 0.7 % (ref 0–3)
MODALITY: ABNORMAL
MODALITY: ABNORMAL
MONOCYTES # BLD AUTO: 0.65 10*3/MM3 (ref 0.1–0.9)
MONOCYTES NFR BLD AUTO: 7.1 % (ref 0–12)
MUCOUS THREADS URNS QL MICRO: ABNORMAL /HPF
NEUTROPHILS # BLD AUTO: 7.85 10*3/MM3 (ref 1.4–6.5)
NEUTROPHILS NFR BLD AUTO: 86.2 % (ref 40–75)
NITRITE UR QL STRIP: NEGATIVE
OSMOLALITY SERPL CALC.SUM OF ELEC: 275.8 MOSM/KG (ref 273–305)
OXYHGB MFR BLDV: 89 % (ref 85–100)
OXYHGB MFR BLDV: 91.8 % (ref 85–100)
PCO2 BLDA: 36.7 MM HG (ref 35–45)
PCO2 BLDA: 39.2 MM HG (ref 35–45)
PH BLDA: 7.42 PH UNITS (ref 7.35–7.45)
PH BLDA: 7.47 PH UNITS (ref 7.35–7.45)
PH UR STRIP.AUTO: <=5 [PH] (ref 5–8)
PLATELET # BLD AUTO: 167 10*3/MM3 (ref 130–400)
PMV BLD AUTO: 10.4 FL (ref 6–10)
PO2 BLDA: 58.6 MM HG (ref 80–100)
PO2 BLDA: 70.7 MM HG (ref 80–100)
POTASSIUM BLD-SCNC: 4.9 MMOL/L (ref 3.5–5.3)
PROT SERPL-MCNC: 7.3 G/DL (ref 6–8)
PROT UR QL STRIP: NEGATIVE
RBC # BLD AUTO: 4.43 10*6/MM3 (ref 4.7–6.1)
RBC # UR: ABNORMAL /HPF
REF LAB TEST METHOD: ABNORMAL
S PYO AG THROAT QL: NEGATIVE
SAO2 % BLDCOA: 91.1 % (ref 90–100)
SAO2 % BLDCOA: 93.8 % (ref 90–100)
SODIUM BLD-SCNC: 134 MMOL/L (ref 135–153)
SP GR UR STRIP: 1.02 (ref 1–1.03)
SQUAMOUS #/AREA URNS HPF: ABNORMAL /HPF
TROPONIN I SERPL-MCNC: <0.006 NG/ML
UROBILINOGEN UR QL STRIP: ABNORMAL
WBC NRBC COR # BLD: 9.12 10*3/MM3 (ref 4.5–12.5)
WBC UR QL AUTO: ABNORMAL /HPF
WHOLE BLOOD HOLD SPECIMEN: NORMAL
WHOLE BLOOD HOLD SPECIMEN: NORMAL

## 2018-09-11 PROCEDURE — 74176 CT ABD & PELVIS W/O CONTRAST: CPT | Performed by: RADIOLOGY

## 2018-09-11 PROCEDURE — 85025 COMPLETE CBC W/AUTO DIFF WBC: CPT | Performed by: FAMILY MEDICINE

## 2018-09-11 PROCEDURE — 83050 HGB METHEMOGLOBIN QUAN: CPT | Performed by: FAMILY MEDICINE

## 2018-09-11 PROCEDURE — 82805 BLOOD GASES W/O2 SATURATION: CPT | Performed by: FAMILY MEDICINE

## 2018-09-11 PROCEDURE — 82150 ASSAY OF AMYLASE: CPT | Performed by: FAMILY MEDICINE

## 2018-09-11 PROCEDURE — 87150 DNA/RNA AMPLIFIED PROBE: CPT | Performed by: FAMILY MEDICINE

## 2018-09-11 PROCEDURE — 71045 X-RAY EXAM CHEST 1 VIEW: CPT | Performed by: RADIOLOGY

## 2018-09-11 PROCEDURE — 82375 ASSAY CARBOXYHB QUANT: CPT | Performed by: FAMILY MEDICINE

## 2018-09-11 PROCEDURE — 80053 COMPREHEN METABOLIC PANEL: CPT | Performed by: FAMILY MEDICINE

## 2018-09-11 PROCEDURE — 96361 HYDRATE IV INFUSION ADD-ON: CPT

## 2018-09-11 PROCEDURE — 25010000002 MORPHINE SULFATE (PF) 2 MG/ML SOLUTION: Performed by: FAMILY MEDICINE

## 2018-09-11 PROCEDURE — 86140 C-REACTIVE PROTEIN: CPT | Performed by: FAMILY MEDICINE

## 2018-09-11 PROCEDURE — 74176 CT ABD & PELVIS W/O CONTRAST: CPT

## 2018-09-11 PROCEDURE — 81001 URINALYSIS AUTO W/SCOPE: CPT | Performed by: FAMILY MEDICINE

## 2018-09-11 PROCEDURE — 71250 CT THORAX DX C-: CPT

## 2018-09-11 PROCEDURE — 83735 ASSAY OF MAGNESIUM: CPT | Performed by: FAMILY MEDICINE

## 2018-09-11 PROCEDURE — 99285 EMERGENCY DEPT VISIT HI MDM: CPT

## 2018-09-11 PROCEDURE — 87077 CULTURE AEROBIC IDENTIFY: CPT | Performed by: FAMILY MEDICINE

## 2018-09-11 PROCEDURE — 83605 ASSAY OF LACTIC ACID: CPT | Performed by: FAMILY MEDICINE

## 2018-09-11 PROCEDURE — 87081 CULTURE SCREEN ONLY: CPT | Performed by: FAMILY MEDICINE

## 2018-09-11 PROCEDURE — 87804 INFLUENZA ASSAY W/OPTIC: CPT | Performed by: FAMILY MEDICINE

## 2018-09-11 PROCEDURE — 93010 ELECTROCARDIOGRAM REPORT: CPT | Performed by: INTERNAL MEDICINE

## 2018-09-11 PROCEDURE — 25010000002 ONDANSETRON PER 1 MG: Performed by: FAMILY MEDICINE

## 2018-09-11 PROCEDURE — 84484 ASSAY OF TROPONIN QUANT: CPT | Performed by: FAMILY MEDICINE

## 2018-09-11 PROCEDURE — 71250 CT THORAX DX C-: CPT | Performed by: RADIOLOGY

## 2018-09-11 PROCEDURE — 87040 BLOOD CULTURE FOR BACTERIA: CPT | Performed by: FAMILY MEDICINE

## 2018-09-11 PROCEDURE — 82330 ASSAY OF CALCIUM: CPT | Performed by: FAMILY MEDICINE

## 2018-09-11 PROCEDURE — 96365 THER/PROPH/DIAG IV INF INIT: CPT

## 2018-09-11 PROCEDURE — 36600 WITHDRAWAL OF ARTERIAL BLOOD: CPT | Performed by: FAMILY MEDICINE

## 2018-09-11 PROCEDURE — 36415 COLL VENOUS BLD VENIPUNCTURE: CPT

## 2018-09-11 PROCEDURE — 87186 SC STD MICRODIL/AGAR DIL: CPT | Performed by: FAMILY MEDICINE

## 2018-09-11 PROCEDURE — 83690 ASSAY OF LIPASE: CPT | Performed by: FAMILY MEDICINE

## 2018-09-11 PROCEDURE — 96375 TX/PRO/DX INJ NEW DRUG ADDON: CPT

## 2018-09-11 PROCEDURE — P9612 CATHETERIZE FOR URINE SPEC: HCPCS

## 2018-09-11 PROCEDURE — 93005 ELECTROCARDIOGRAM TRACING: CPT | Performed by: FAMILY MEDICINE

## 2018-09-11 PROCEDURE — 71045 X-RAY EXAM CHEST 1 VIEW: CPT

## 2018-09-11 PROCEDURE — 87880 STREP A ASSAY W/OPTIC: CPT | Performed by: FAMILY MEDICINE

## 2018-09-11 RX ORDER — DOXYCYCLINE HYCLATE 100 MG/1
100 TABLET, DELAYED RELEASE ORAL 2 TIMES DAILY
Qty: 20 TABLET | Refills: 0 | Status: ON HOLD | OUTPATIENT
Start: 2018-09-11 | End: 2018-09-12

## 2018-09-11 RX ORDER — SODIUM CHLORIDE 0.9 % (FLUSH) 0.9 %
10 SYRINGE (ML) INJECTION AS NEEDED
Status: DISCONTINUED | OUTPATIENT
Start: 2018-09-11 | End: 2018-09-11 | Stop reason: HOSPADM

## 2018-09-11 RX ORDER — ONDANSETRON 2 MG/ML
4 INJECTION INTRAMUSCULAR; INTRAVENOUS ONCE
Status: COMPLETED | OUTPATIENT
Start: 2018-09-11 | End: 2018-09-11

## 2018-09-11 RX ORDER — MORPHINE SULFATE 2 MG/ML
4 INJECTION, SOLUTION INTRAMUSCULAR; INTRAVENOUS ONCE
Status: COMPLETED | OUTPATIENT
Start: 2018-09-11 | End: 2018-09-11

## 2018-09-11 RX ADMIN — ONDANSETRON 4 MG: 2 INJECTION INTRAMUSCULAR; INTRAVENOUS at 03:21

## 2018-09-11 RX ADMIN — SODIUM CHLORIDE 1000 ML: 9 INJECTION, SOLUTION INTRAVENOUS at 03:21

## 2018-09-11 RX ADMIN — MORPHINE SULFATE 4 MG: 2 INJECTION, SOLUTION INTRAMUSCULAR; INTRAVENOUS at 05:02

## 2018-09-11 RX ADMIN — DOXYCYCLINE 100 MG: 100 INJECTION, POWDER, LYOPHILIZED, FOR SOLUTION INTRAVENOUS at 07:16

## 2018-09-11 RX ADMIN — SODIUM CHLORIDE 1000 ML: 9 INJECTION, SOLUTION INTRAVENOUS at 06:08

## 2018-09-12 ENCOUNTER — APPOINTMENT (OUTPATIENT)
Dept: GENERAL RADIOLOGY | Facility: HOSPITAL | Age: 68
End: 2018-09-12

## 2018-09-12 ENCOUNTER — HOSPITAL ENCOUNTER (INPATIENT)
Facility: HOSPITAL | Age: 68
LOS: 5 days | Discharge: HOME | End: 2018-09-17
Attending: EMERGENCY MEDICINE | Admitting: INTERNAL MEDICINE

## 2018-09-12 ENCOUNTER — TELEPHONE (OUTPATIENT)
Dept: ONCOLOGY | Facility: CLINIC | Age: 68
End: 2018-09-12

## 2018-09-12 ENCOUNTER — TELEPHONE (OUTPATIENT)
Dept: OTHER | Facility: HOSPITAL | Age: 68
End: 2018-09-12

## 2018-09-12 DIAGNOSIS — B96.20 BACTEREMIA DUE TO ESCHERICHIA COLI: Primary | ICD-10-CM

## 2018-09-12 DIAGNOSIS — R78.81 BACTEREMIA DUE TO ESCHERICHIA COLI: Primary | ICD-10-CM

## 2018-09-12 LAB
6-ACETYL MORPHINE: NEGATIVE
A-A DO2: 22.4 MMHG (ref 0–300)
ALBUMIN SERPL-MCNC: 4.1 G/DL (ref 3.4–4.8)
ALBUMIN/GLOB SERPL: 1.4 G/DL (ref 1.5–2.5)
ALP SERPL-CCNC: 160 U/L (ref 40–129)
ALT SERPL W P-5'-P-CCNC: 76 U/L (ref 10–44)
AMPHET+METHAMPHET UR QL: NEGATIVE
AMYLASE SERPL-CCNC: 49 U/L (ref 28–100)
ANION GAP SERPL CALCULATED.3IONS-SCNC: 4.3 MMOL/L (ref 3.6–11.2)
ARTERIAL PATENCY WRIST A: ABNORMAL
AST SERPL-CCNC: 56 U/L (ref 10–34)
ATMOSPHERIC PRESS: 729 MMHG
BACTERIA SPEC AEROBE CULT: NORMAL
BACTERIA UR QL AUTO: NORMAL /HPF
BARBITURATES UR QL SCN: NEGATIVE
BASE EXCESS BLDA CALC-SCNC: -0.4 MMOL/L
BASOPHILS # BLD AUTO: 0.01 10*3/MM3 (ref 0–0.3)
BASOPHILS NFR BLD AUTO: 0.2 % (ref 0–2)
BDY SITE: ABNORMAL
BENZODIAZ UR QL SCN: NEGATIVE
BILIRUB SERPL-MCNC: 0.8 MG/DL (ref 0.2–1.8)
BILIRUB UR QL STRIP: NEGATIVE
BNP SERPL-MCNC: 124 PG/ML (ref 0–100)
BODY TEMPERATURE: 98.6 C
BUN BLD-MCNC: 23 MG/DL (ref 7–21)
BUN/CREAT SERPL: 12.2 (ref 7–25)
BUPRENORPHINE SERPL-MCNC: NEGATIVE NG/ML
CALCIUM SPEC-SCNC: 9.5 MG/DL (ref 7.7–10)
CANNABINOIDS SERPL QL: NEGATIVE
CHLORIDE SERPL-SCNC: 101 MMOL/L (ref 99–112)
CLARITY UR: CLEAR
CO2 SERPL-SCNC: 27.7 MMOL/L (ref 24.3–31.9)
COCAINE UR QL: NEGATIVE
COHGB MFR BLD: 0.9 % (ref 0–5)
COLOR UR: YELLOW
CREAT BLD-MCNC: 1.89 MG/DL (ref 0.43–1.29)
CRP SERPL-MCNC: 4.48 MG/DL (ref 0–0.99)
D-LACTATE SERPL-SCNC: 1 MMOL/L (ref 0.5–2)
DEPRECATED RDW RBC AUTO: 44 FL (ref 37–54)
EOSINOPHIL # BLD AUTO: 0.1 10*3/MM3 (ref 0–0.7)
EOSINOPHIL NFR BLD AUTO: 1.8 % (ref 0–7)
ERYTHROCYTE [DISTWIDTH] IN BLOOD BY AUTOMATED COUNT: 13.5 % (ref 11.5–14.5)
FLUAV AG NPH QL: NEGATIVE
FLUBV AG NPH QL IA: NEGATIVE
GFR SERPL CREATININE-BSD FRML MDRD: 35 ML/MIN/1.73
GLOBULIN UR ELPH-MCNC: 3 GM/DL
GLUCOSE BLD-MCNC: 105 MG/DL (ref 70–110)
GLUCOSE BLDC GLUCOMTR-MCNC: 114 MG/DL (ref 70–130)
GLUCOSE BLDC GLUCOMTR-MCNC: 122 MG/DL (ref 70–130)
GLUCOSE UR STRIP-MCNC: NEGATIVE MG/DL
HCO3 BLDA-SCNC: 23 MMOL/L (ref 22–26)
HCT VFR BLD AUTO: 36.2 % (ref 42–52)
HCT VFR BLD CALC: 36 % (ref 42–52)
HGB BLD-MCNC: 11.9 G/DL (ref 14–18)
HGB BLDA-MCNC: 12.3 G/DL (ref 12–16)
HGB UR QL STRIP.AUTO: ABNORMAL
HOROWITZ INDEX BLD+IHG-RTO: 21 %
HYALINE CASTS UR QL AUTO: NORMAL /LPF
IMM GRANULOCYTES # BLD: 0.02 10*3/MM3 (ref 0–0.03)
IMM GRANULOCYTES NFR BLD: 0.4 % (ref 0–0.5)
KETONES UR QL STRIP: NEGATIVE
LEUKOCYTE ESTERASE UR QL STRIP.AUTO: NEGATIVE
LIPASE SERPL-CCNC: 25 U/L (ref 13–60)
LYMPHOCYTES # BLD AUTO: 1.12 10*3/MM3 (ref 1–3)
LYMPHOCYTES NFR BLD AUTO: 20.2 % (ref 16–46)
MCH RBC QN AUTO: 29.8 PG (ref 27–33)
MCHC RBC AUTO-ENTMCNC: 32.9 G/DL (ref 33–37)
MCV RBC AUTO: 90.7 FL (ref 80–94)
METHADONE UR QL SCN: NEGATIVE
METHGB BLD QL: 0.4 % (ref 0–3)
MODALITY: ABNORMAL
MONOCYTES # BLD AUTO: 0.63 10*3/MM3 (ref 0.1–0.9)
MONOCYTES NFR BLD AUTO: 11.4 % (ref 0–12)
NEUTROPHILS # BLD AUTO: 3.66 10*3/MM3 (ref 1.4–6.5)
NEUTROPHILS NFR BLD AUTO: 66 % (ref 40–75)
NITRITE UR QL STRIP: NEGATIVE
OPIATES UR QL: POSITIVE
OSMOLALITY SERPL CALC.SUM OF ELEC: 270.4 MOSM/KG (ref 273–305)
OXYCODONE UR QL SCN: POSITIVE
OXYHGB MFR BLDV: 94.8 % (ref 85–100)
PCO2 BLDA: 34 MM HG (ref 35–45)
PCP UR QL SCN: NEGATIVE
PH BLDA: 7.45 PH UNITS (ref 7.35–7.45)
PH UR STRIP.AUTO: 5.5 [PH] (ref 5–8)
PLATELET # BLD AUTO: 147 10*3/MM3 (ref 130–400)
PMV BLD AUTO: 10.4 FL (ref 6–10)
PO2 BLDA: 80.1 MM HG (ref 80–100)
POTASSIUM BLD-SCNC: 4.4 MMOL/L (ref 3.5–5.3)
PROT SERPL-MCNC: 7.1 G/DL (ref 6–8)
PROT UR QL STRIP: NEGATIVE
RBC # BLD AUTO: 3.99 10*6/MM3 (ref 4.7–6.1)
RBC # UR: NORMAL /HPF
REF LAB TEST METHOD: NORMAL
SAO2 % BLDCOA: 96 % (ref 90–100)
SODIUM BLD-SCNC: 133 MMOL/L (ref 135–153)
SP GR UR STRIP: 1.02 (ref 1–1.03)
SQUAMOUS #/AREA URNS HPF: NORMAL /HPF
TROPONIN I SERPL-MCNC: <0.006 NG/ML
UROBILINOGEN UR QL STRIP: ABNORMAL
WBC NRBC COR # BLD: 5.54 10*3/MM3 (ref 4.5–12.5)
WBC UR QL AUTO: NORMAL /HPF

## 2018-09-12 PROCEDURE — 80053 COMPREHEN METABOLIC PANEL: CPT | Performed by: PHYSICIAN ASSISTANT

## 2018-09-12 PROCEDURE — 80307 DRUG TEST PRSMV CHEM ANLYZR: CPT | Performed by: INTERNAL MEDICINE

## 2018-09-12 PROCEDURE — 83605 ASSAY OF LACTIC ACID: CPT | Performed by: PHYSICIAN ASSISTANT

## 2018-09-12 PROCEDURE — 82375 ASSAY CARBOXYHB QUANT: CPT | Performed by: PHYSICIAN ASSISTANT

## 2018-09-12 PROCEDURE — 81001 URINALYSIS AUTO W/SCOPE: CPT | Performed by: PHYSICIAN ASSISTANT

## 2018-09-12 PROCEDURE — 82805 BLOOD GASES W/O2 SATURATION: CPT | Performed by: PHYSICIAN ASSISTANT

## 2018-09-12 PROCEDURE — 71046 X-RAY EXAM CHEST 2 VIEWS: CPT | Performed by: RADIOLOGY

## 2018-09-12 PROCEDURE — 84484 ASSAY OF TROPONIN QUANT: CPT | Performed by: PHYSICIAN ASSISTANT

## 2018-09-12 PROCEDURE — 93010 ELECTROCARDIOGRAM REPORT: CPT | Performed by: INTERNAL MEDICINE

## 2018-09-12 PROCEDURE — 94799 UNLISTED PULMONARY SVC/PX: CPT

## 2018-09-12 PROCEDURE — 87804 INFLUENZA ASSAY W/OPTIC: CPT | Performed by: PHYSICIAN ASSISTANT

## 2018-09-12 PROCEDURE — 99223 1ST HOSP IP/OBS HIGH 75: CPT | Performed by: INTERNAL MEDICINE

## 2018-09-12 PROCEDURE — 83880 ASSAY OF NATRIURETIC PEPTIDE: CPT | Performed by: PHYSICIAN ASSISTANT

## 2018-09-12 PROCEDURE — 86140 C-REACTIVE PROTEIN: CPT | Performed by: PHYSICIAN ASSISTANT

## 2018-09-12 PROCEDURE — 87040 BLOOD CULTURE FOR BACTERIA: CPT | Performed by: PHYSICIAN ASSISTANT

## 2018-09-12 PROCEDURE — 25010000002 CEFEPIME 2 G/NS 100 ML SOLUTION: Performed by: INTERNAL MEDICINE

## 2018-09-12 PROCEDURE — 36415 COLL VENOUS BLD VENIPUNCTURE: CPT

## 2018-09-12 PROCEDURE — 93005 ELECTROCARDIOGRAM TRACING: CPT | Performed by: PHYSICIAN ASSISTANT

## 2018-09-12 PROCEDURE — 71046 X-RAY EXAM CHEST 2 VIEWS: CPT

## 2018-09-12 PROCEDURE — 82962 GLUCOSE BLOOD TEST: CPT

## 2018-09-12 PROCEDURE — 85025 COMPLETE CBC W/AUTO DIFF WBC: CPT | Performed by: PHYSICIAN ASSISTANT

## 2018-09-12 PROCEDURE — 83690 ASSAY OF LIPASE: CPT | Performed by: PHYSICIAN ASSISTANT

## 2018-09-12 PROCEDURE — 99284 EMERGENCY DEPT VISIT MOD MDM: CPT

## 2018-09-12 PROCEDURE — 25010000002 MEROPENEM: Performed by: PHYSICIAN ASSISTANT

## 2018-09-12 PROCEDURE — 36600 WITHDRAWAL OF ARTERIAL BLOOD: CPT | Performed by: PHYSICIAN ASSISTANT

## 2018-09-12 PROCEDURE — 83050 HGB METHEMOGLOBIN QUAN: CPT | Performed by: PHYSICIAN ASSISTANT

## 2018-09-12 PROCEDURE — 82150 ASSAY OF AMYLASE: CPT | Performed by: PHYSICIAN ASSISTANT

## 2018-09-12 RX ORDER — FLUOXETINE HYDROCHLORIDE 20 MG/1
20 CAPSULE ORAL NIGHTLY
Status: CANCELLED | OUTPATIENT
Start: 2018-09-12

## 2018-09-12 RX ORDER — SODIUM CHLORIDE 0.9 % (FLUSH) 0.9 %
10 SYRINGE (ML) INJECTION AS NEEDED
Status: DISCONTINUED | OUTPATIENT
Start: 2018-09-12 | End: 2018-09-17 | Stop reason: HOSPADM

## 2018-09-12 RX ORDER — NITROGLYCERIN 0.4 MG/1
0.4 TABLET SUBLINGUAL AS NEEDED
Status: CANCELLED | OUTPATIENT
Start: 2018-09-12

## 2018-09-12 RX ORDER — NICOTINE POLACRILEX 4 MG
15 LOZENGE BUCCAL
Status: DISCONTINUED | OUTPATIENT
Start: 2018-09-12 | End: 2018-09-17 | Stop reason: HOSPADM

## 2018-09-12 RX ORDER — ALBUTEROL SULFATE 2.5 MG/3ML
2.5 SOLUTION RESPIRATORY (INHALATION) EVERY 4 HOURS PRN
Status: CANCELLED | OUTPATIENT
Start: 2018-09-12

## 2018-09-12 RX ORDER — MORPHINE SULFATE 30 MG/1
30 TABLET, FILM COATED, EXTENDED RELEASE ORAL EVERY 12 HOURS SCHEDULED
Status: CANCELLED | OUTPATIENT
Start: 2018-09-12

## 2018-09-12 RX ORDER — IPRATROPIUM BROMIDE AND ALBUTEROL SULFATE 2.5; .5 MG/3ML; MG/3ML
3 SOLUTION RESPIRATORY (INHALATION)
Status: DISCONTINUED | OUTPATIENT
Start: 2018-09-12 | End: 2018-09-16

## 2018-09-12 RX ORDER — BUDESONIDE 0.5 MG/2ML
0.5 INHALANT ORAL
Status: CANCELLED | OUTPATIENT
Start: 2018-09-12

## 2018-09-12 RX ORDER — POLYETHYLENE GLYCOL 3350 17 G/17G
17 POWDER, FOR SOLUTION ORAL DAILY PRN
Status: CANCELLED | OUTPATIENT
Start: 2018-09-12

## 2018-09-12 RX ORDER — PIOGLITAZONEHYDROCHLORIDE 15 MG/1
30 TABLET ORAL DAILY
Status: CANCELLED | OUTPATIENT
Start: 2018-09-13

## 2018-09-12 RX ORDER — OMEGA-3S/DHA/EPA/FISH OIL/D3 300MG-1000
1000 CAPSULE ORAL DAILY
Status: DISCONTINUED | OUTPATIENT
Start: 2018-09-13 | End: 2018-09-17 | Stop reason: HOSPADM

## 2018-09-12 RX ORDER — TROLAMINE SALICYLATE 10 G/100G
CREAM TOPICAL 2 TIMES DAILY PRN
Status: CANCELLED | OUTPATIENT
Start: 2018-09-12

## 2018-09-12 RX ORDER — FLUOXETINE HYDROCHLORIDE 20 MG/1
20 CAPSULE ORAL NIGHTLY
Status: DISCONTINUED | OUTPATIENT
Start: 2018-09-12 | End: 2018-09-17 | Stop reason: HOSPADM

## 2018-09-12 RX ORDER — POLYETHYLENE GLYCOL 3350 17 G/17G
17 POWDER, FOR SOLUTION ORAL DAILY PRN
Status: DISCONTINUED | OUTPATIENT
Start: 2018-09-12 | End: 2018-09-17 | Stop reason: HOSPADM

## 2018-09-12 RX ORDER — OXYCODONE HYDROCHLORIDE 5 MG/1
5 TABLET ORAL EVERY 6 HOURS PRN
Status: DISCONTINUED | OUTPATIENT
Start: 2018-09-12 | End: 2018-09-17 | Stop reason: HOSPADM

## 2018-09-12 RX ORDER — OXYCODONE HYDROCHLORIDE 5 MG/1
5 TABLET ORAL EVERY 6 HOURS PRN
Status: CANCELLED | OUTPATIENT
Start: 2018-09-12

## 2018-09-12 RX ORDER — SODIUM CHLORIDE 9 MG/ML
50 INJECTION, SOLUTION INTRAVENOUS CONTINUOUS
Status: DISCONTINUED | OUTPATIENT
Start: 2018-09-12 | End: 2018-09-15

## 2018-09-12 RX ORDER — PANTOPRAZOLE SODIUM 40 MG/1
40 TABLET, DELAYED RELEASE ORAL EVERY MORNING
Status: DISCONTINUED | OUTPATIENT
Start: 2018-09-13 | End: 2018-09-17 | Stop reason: HOSPADM

## 2018-09-12 RX ORDER — FLECAINIDE ACETATE 50 MG/1
50 TABLET ORAL EVERY 12 HOURS SCHEDULED
Status: CANCELLED | OUTPATIENT
Start: 2018-09-12

## 2018-09-12 RX ORDER — HEPARIN SODIUM 5000 [USP'U]/ML
5000 INJECTION, SOLUTION INTRAVENOUS; SUBCUTANEOUS EVERY 12 HOURS SCHEDULED
Status: DISCONTINUED | OUTPATIENT
Start: 2018-09-12 | End: 2018-09-12

## 2018-09-12 RX ORDER — BUDESONIDE 0.5 MG/2ML
0.5 INHALANT ORAL
Status: DISCONTINUED | OUTPATIENT
Start: 2018-09-12 | End: 2018-09-17 | Stop reason: HOSPADM

## 2018-09-12 RX ORDER — DEXTROSE MONOHYDRATE 25 G/50ML
25 INJECTION, SOLUTION INTRAVENOUS
Status: DISCONTINUED | OUTPATIENT
Start: 2018-09-12 | End: 2018-09-17 | Stop reason: HOSPADM

## 2018-09-12 RX ORDER — FLUOXETINE HYDROCHLORIDE 20 MG/1
40 CAPSULE ORAL EVERY MORNING
Status: DISCONTINUED | OUTPATIENT
Start: 2018-09-13 | End: 2018-09-17 | Stop reason: HOSPADM

## 2018-09-12 RX ORDER — MONTELUKAST SODIUM 10 MG/1
10 TABLET ORAL DAILY
Status: CANCELLED | OUTPATIENT
Start: 2018-09-13

## 2018-09-12 RX ORDER — ATORVASTATIN CALCIUM 10 MG/1
5 TABLET, FILM COATED ORAL NIGHTLY
Status: DISCONTINUED | OUTPATIENT
Start: 2018-09-12 | End: 2018-09-14

## 2018-09-12 RX ORDER — OXYCODONE HYDROCHLORIDE 5 MG/1
5 TABLET ORAL EVERY 6 HOURS PRN
COMMUNITY
End: 2018-09-25 | Stop reason: SDUPTHER

## 2018-09-12 RX ORDER — METOPROLOL TARTRATE 50 MG/1
25 TABLET, FILM COATED ORAL AS NEEDED
COMMUNITY
End: 2018-12-07

## 2018-09-12 RX ORDER — MENTHOL AND METHYL SALICYLATE 10; 30 G/100G; G/100G
1 CREAM TOPICAL 3 TIMES DAILY PRN
COMMUNITY
End: 2018-12-07

## 2018-09-12 RX ORDER — SODIUM CHLORIDE 0.9 % (FLUSH) 0.9 %
1-10 SYRINGE (ML) INJECTION AS NEEDED
Status: DISCONTINUED | OUTPATIENT
Start: 2018-09-12 | End: 2018-09-17 | Stop reason: HOSPADM

## 2018-09-12 RX ORDER — OMEGA-3S/DHA/EPA/FISH OIL/D3 300MG-1000
1000 CAPSULE ORAL DAILY
Status: CANCELLED | OUTPATIENT
Start: 2018-09-13

## 2018-09-12 RX ORDER — PANTOPRAZOLE SODIUM 40 MG/1
40 TABLET, DELAYED RELEASE ORAL EVERY MORNING
Status: CANCELLED | OUTPATIENT
Start: 2018-09-13

## 2018-09-12 RX ORDER — COLCHICINE 0.6 MG/1
0.6 TABLET ORAL DAILY
Status: CANCELLED | OUTPATIENT
Start: 2018-09-13

## 2018-09-12 RX ORDER — ATORVASTATIN CALCIUM 10 MG/1
5 TABLET, FILM COATED ORAL NIGHTLY
Status: CANCELLED | OUTPATIENT
Start: 2018-09-12

## 2018-09-12 RX ORDER — ONDANSETRON 4 MG/1
4 TABLET, FILM COATED ORAL EVERY 4 HOURS PRN
Status: CANCELLED | OUTPATIENT
Start: 2018-09-12

## 2018-09-12 RX ORDER — NITROGLYCERIN 0.4 MG/1
0.4 TABLET SUBLINGUAL AS NEEDED
Status: DISCONTINUED | OUTPATIENT
Start: 2018-09-12 | End: 2018-09-12 | Stop reason: SDUPTHER

## 2018-09-12 RX ORDER — FLUOXETINE HYDROCHLORIDE 20 MG/1
40 CAPSULE ORAL EVERY MORNING
COMMUNITY

## 2018-09-12 RX ORDER — FLECAINIDE ACETATE 50 MG/1
50 TABLET ORAL EVERY 12 HOURS SCHEDULED
Status: DISCONTINUED | OUTPATIENT
Start: 2018-09-12 | End: 2018-09-17 | Stop reason: HOSPADM

## 2018-09-12 RX ORDER — FLUOXETINE HYDROCHLORIDE 20 MG/1
40 CAPSULE ORAL EVERY MORNING
Status: CANCELLED | OUTPATIENT
Start: 2018-09-13

## 2018-09-12 RX ORDER — DOCUSATE SODIUM 100 MG/1
100 CAPSULE, LIQUID FILLED ORAL 2 TIMES DAILY
Status: DISCONTINUED | OUTPATIENT
Start: 2018-09-12 | End: 2018-09-17 | Stop reason: HOSPADM

## 2018-09-12 RX ORDER — MONTELUKAST SODIUM 10 MG/1
10 TABLET ORAL DAILY
Status: DISCONTINUED | OUTPATIENT
Start: 2018-09-13 | End: 2018-09-17 | Stop reason: HOSPADM

## 2018-09-12 RX ORDER — ARFORMOTEROL TARTRATE 15 UG/2ML
15 SOLUTION RESPIRATORY (INHALATION)
Status: DISCONTINUED | OUTPATIENT
Start: 2018-09-12 | End: 2018-09-17 | Stop reason: HOSPADM

## 2018-09-12 RX ORDER — NITROGLYCERIN 0.4 MG/1
0.4 TABLET SUBLINGUAL
Status: DISCONTINUED | OUTPATIENT
Start: 2018-09-12 | End: 2018-09-17 | Stop reason: HOSPADM

## 2018-09-12 RX ORDER — MORPHINE SULFATE 30 MG/1
30 TABLET, FILM COATED, EXTENDED RELEASE ORAL EVERY 12 HOURS SCHEDULED
Status: DISCONTINUED | OUTPATIENT
Start: 2018-09-12 | End: 2018-09-17 | Stop reason: HOSPADM

## 2018-09-12 RX ADMIN — APIXABAN 5 MG: 5 TABLET, FILM COATED ORAL at 20:28

## 2018-09-12 RX ADMIN — CEFEPIME 2 G: 2 INJECTION, POWDER, FOR SOLUTION INTRAVENOUS at 20:30

## 2018-09-12 RX ADMIN — BUDESONIDE 0.5 MG: 0.5 SUSPENSION RESPIRATORY (INHALATION) at 18:54

## 2018-09-12 RX ADMIN — ATORVASTATIN CALCIUM 5 MG: 10 TABLET, FILM COATED ORAL at 20:28

## 2018-09-12 RX ADMIN — MORPHINE SULFATE 30 MG: 30 TABLET, EXTENDED RELEASE ORAL at 20:29

## 2018-09-12 RX ADMIN — FLECAINIDE ACETATE 50 MG: 50 TABLET ORAL at 20:28

## 2018-09-12 RX ADMIN — POLYETHYLENE GLYCOL (3350) 17 G: 17 POWDER, FOR SOLUTION ORAL at 21:09

## 2018-09-12 RX ADMIN — FLUOXETINE 20 MG: 20 CAPSULE ORAL at 20:28

## 2018-09-12 RX ADMIN — SODIUM CHLORIDE 75 ML/HR: 9 INJECTION, SOLUTION INTRAVENOUS at 20:29

## 2018-09-12 RX ADMIN — DOCUSATE SODIUM 100 MG: 100 CAPSULE, LIQUID FILLED ORAL at 21:09

## 2018-09-12 RX ADMIN — MEROPENEM 1 G: 1 INJECTION, POWDER, FOR SOLUTION INTRAVENOUS at 16:16

## 2018-09-12 RX ADMIN — ARFORMOTEROL TARTRATE 15 MCG: 15 SOLUTION RESPIRATORY (INHALATION) at 18:54

## 2018-09-12 NOTE — TELEPHONE ENCOUNTER
----- Message from Selin Joe sent at 9/12/2018 11:01 AM EDT -----  Regarding: PATIENT CALL  Contact: [phone]  The patient's wife called to let you know Todd had been in the ER the other night and was dx'd with pneumonia.  They put him on antibiotics and sent him home.  She said that someone from the hospital called her this morning and told her they had missed something on his blood work and wanted her to bring him back to be admitted.  She's not sure if he's septic again.  She's worried because he's supposed to go to [facility] tomorrow for a biopsy.  She would like you to indio her back ASAP.

## 2018-09-12 NOTE — TELEPHONE ENCOUNTER
Wife called stating patient went to ER 09/10/2018 with fever, nausea, and vomiting. Labs drawn at that time and discharged. Patient has been called back today to return to ER for positive E.coli cultures and admission to [facility]. Patient has biopsy scheduled 09/14/2018 and they need to cancel this at this time due to admission and reschedule. Notified ROHIT [facility] GI navigator to assist. Notified wife, she v/u and agreeable to plan. She knows to call with questions or concerns.

## 2018-09-13 ENCOUNTER — READMISSION MANAGEMENT (OUTPATIENT)
Dept: CALL CENTER | Facility: HOSPITAL | Age: 68
End: 2018-09-13

## 2018-09-13 ENCOUNTER — DOCUMENTATION (OUTPATIENT)
Dept: OTHER | Facility: HOSPITAL | Age: 68
End: 2018-09-13

## 2018-09-13 LAB
ALBUMIN SERPL-MCNC: 3.6 G/DL (ref 3.4–4.8)
ALBUMIN/GLOB SERPL: 1.3 G/DL (ref 1.5–2.5)
ALP SERPL-CCNC: 170 U/L (ref 40–129)
ALT SERPL W P-5'-P-CCNC: 72 U/L (ref 10–44)
ANION GAP SERPL CALCULATED.3IONS-SCNC: 9.7 MMOL/L (ref 3.6–11.2)
AST SERPL-CCNC: 64 U/L (ref 10–34)
BACTERIA SPEC AEROBE CULT: ABNORMAL
BACTERIA SPEC AEROBE CULT: ABNORMAL
BASOPHILS # BLD AUTO: 0.03 10*3/MM3 (ref 0–0.3)
BASOPHILS NFR BLD AUTO: 0.7 % (ref 0–2)
BILIRUB SERPL-MCNC: 0.7 MG/DL (ref 0.2–1.8)
BUN BLD-MCNC: 17 MG/DL (ref 7–21)
BUN/CREAT SERPL: 10.4 (ref 7–25)
CALCIUM SPEC-SCNC: 9 MG/DL (ref 7.7–10)
CHLORIDE SERPL-SCNC: 105 MMOL/L (ref 99–112)
CO2 SERPL-SCNC: 23.3 MMOL/L (ref 24.3–31.9)
CREAT BLD-MCNC: 1.64 MG/DL (ref 0.43–1.29)
CRP SERPL-MCNC: 3.03 MG/DL (ref 0–0.99)
DEPRECATED RDW RBC AUTO: 44.7 FL (ref 37–54)
EOSINOPHIL # BLD AUTO: 0.18 10*3/MM3 (ref 0–0.7)
EOSINOPHIL NFR BLD AUTO: 4.1 % (ref 0–7)
ERYTHROCYTE [DISTWIDTH] IN BLOOD BY AUTOMATED COUNT: 13.6 % (ref 11.5–14.5)
GFR SERPL CREATININE-BSD FRML MDRD: 42 ML/MIN/1.73
GLOBULIN UR ELPH-MCNC: 2.7 GM/DL
GLUCOSE BLD-MCNC: 104 MG/DL (ref 70–110)
GLUCOSE BLDC GLUCOMTR-MCNC: 104 MG/DL (ref 70–130)
GLUCOSE BLDC GLUCOMTR-MCNC: 107 MG/DL (ref 70–130)
GLUCOSE BLDC GLUCOMTR-MCNC: 123 MG/DL (ref 70–130)
GLUCOSE BLDC GLUCOMTR-MCNC: 141 MG/DL (ref 70–130)
GRAM STN SPEC: ABNORMAL
GRAM STN SPEC: ABNORMAL
HBA1C MFR BLD: 6.3 % (ref 4.5–5.7)
HCT VFR BLD AUTO: 35.2 % (ref 42–52)
HGB BLD-MCNC: 11.5 G/DL (ref 14–18)
IMM GRANULOCYTES # BLD: 0.02 10*3/MM3 (ref 0–0.03)
IMM GRANULOCYTES NFR BLD: 0.5 % (ref 0–0.5)
ISOLATED FROM: ABNORMAL
ISOLATED FROM: ABNORMAL
LYMPHOCYTES # BLD AUTO: 1.49 10*3/MM3 (ref 1–3)
LYMPHOCYTES NFR BLD AUTO: 34.3 % (ref 16–46)
MCH RBC QN AUTO: 30.3 PG (ref 27–33)
MCHC RBC AUTO-ENTMCNC: 32.7 G/DL (ref 33–37)
MCV RBC AUTO: 92.9 FL (ref 80–94)
MONOCYTES # BLD AUTO: 0.65 10*3/MM3 (ref 0.1–0.9)
MONOCYTES NFR BLD AUTO: 14.9 % (ref 0–12)
NEUTROPHILS # BLD AUTO: 1.98 10*3/MM3 (ref 1.4–6.5)
NEUTROPHILS NFR BLD AUTO: 45.5 % (ref 40–75)
OSMOLALITY SERPL CALC.SUM OF ELEC: 277.5 MOSM/KG (ref 273–305)
PLATELET # BLD AUTO: 129 10*3/MM3 (ref 130–400)
PMV BLD AUTO: 10.5 FL (ref 6–10)
POTASSIUM BLD-SCNC: 4.1 MMOL/L (ref 3.5–5.3)
PROT SERPL-MCNC: 6.3 G/DL (ref 6–8)
RBC # BLD AUTO: 3.79 10*6/MM3 (ref 4.7–6.1)
SODIUM BLD-SCNC: 138 MMOL/L (ref 135–153)
WBC NRBC COR # BLD: 4.35 10*3/MM3 (ref 4.5–12.5)

## 2018-09-13 PROCEDURE — 94799 UNLISTED PULMONARY SVC/PX: CPT

## 2018-09-13 PROCEDURE — 83036 HEMOGLOBIN GLYCOSYLATED A1C: CPT | Performed by: NURSE PRACTITIONER

## 2018-09-13 PROCEDURE — 86140 C-REACTIVE PROTEIN: CPT | Performed by: INTERNAL MEDICINE

## 2018-09-13 PROCEDURE — 85025 COMPLETE CBC W/AUTO DIFF WBC: CPT | Performed by: INTERNAL MEDICINE

## 2018-09-13 PROCEDURE — 82962 GLUCOSE BLOOD TEST: CPT

## 2018-09-13 PROCEDURE — 87040 BLOOD CULTURE FOR BACTERIA: CPT | Performed by: NURSE PRACTITIONER

## 2018-09-13 PROCEDURE — 80053 COMPREHEN METABOLIC PANEL: CPT | Performed by: INTERNAL MEDICINE

## 2018-09-13 PROCEDURE — 25010000002 CEFEPIME 2 G/NS 100 ML SOLUTION: Performed by: INTERNAL MEDICINE

## 2018-09-13 PROCEDURE — 25010000002 ERTAPENEM 1 GM/100ML SOLUTION

## 2018-09-13 PROCEDURE — 99233 SBSQ HOSP IP/OBS HIGH 50: CPT | Performed by: INTERNAL MEDICINE

## 2018-09-13 RX ORDER — ONDANSETRON 4 MG/1
4 TABLET, ORALLY DISINTEGRATING ORAL EVERY 6 HOURS PRN
Status: DISCONTINUED | OUTPATIENT
Start: 2018-09-13 | End: 2018-09-17 | Stop reason: HOSPADM

## 2018-09-13 RX ADMIN — PANTOPRAZOLE SODIUM 40 MG: 40 TABLET, DELAYED RELEASE ORAL at 06:06

## 2018-09-13 RX ADMIN — POLYETHYLENE GLYCOL (3350) 17 G: 17 POWDER, FOR SOLUTION ORAL at 22:56

## 2018-09-13 RX ADMIN — POLYETHYLENE GLYCOL (3350) 17 G: 17 POWDER, FOR SOLUTION ORAL at 08:29

## 2018-09-13 RX ADMIN — IPRATROPIUM BROMIDE AND ALBUTEROL SULFATE 3 ML: .5; 3 SOLUTION RESPIRATORY (INHALATION) at 13:51

## 2018-09-13 RX ADMIN — ATORVASTATIN CALCIUM 5 MG: 10 TABLET, FILM COATED ORAL at 20:34

## 2018-09-13 RX ADMIN — IPRATROPIUM BROMIDE AND ALBUTEROL SULFATE 3 ML: .5; 3 SOLUTION RESPIRATORY (INHALATION) at 00:08

## 2018-09-13 RX ADMIN — DOCUSATE SODIUM 100 MG: 100 CAPSULE, LIQUID FILLED ORAL at 20:34

## 2018-09-13 RX ADMIN — IPRATROPIUM BROMIDE AND ALBUTEROL SULFATE 3 ML: .5; 3 SOLUTION RESPIRATORY (INHALATION) at 07:01

## 2018-09-13 RX ADMIN — CHOLECALCIFEROL TAB 10 MCG (400 UNIT) 1000 UNITS: 10 TAB at 08:28

## 2018-09-13 RX ADMIN — FLECAINIDE ACETATE 50 MG: 50 TABLET ORAL at 20:34

## 2018-09-13 RX ADMIN — BUDESONIDE 0.5 MG: 0.5 SUSPENSION RESPIRATORY (INHALATION) at 19:23

## 2018-09-13 RX ADMIN — APIXABAN 5 MG: 5 TABLET, FILM COATED ORAL at 08:28

## 2018-09-13 RX ADMIN — BUDESONIDE 0.5 MG: 0.5 SUSPENSION RESPIRATORY (INHALATION) at 07:01

## 2018-09-13 RX ADMIN — SODIUM CHLORIDE 75 ML/HR: 9 INJECTION, SOLUTION INTRAVENOUS at 10:30

## 2018-09-13 RX ADMIN — MORPHINE SULFATE 30 MG: 30 TABLET, EXTENDED RELEASE ORAL at 08:28

## 2018-09-13 RX ADMIN — FLECAINIDE ACETATE 50 MG: 50 TABLET ORAL at 08:28

## 2018-09-13 RX ADMIN — ERTAPENEM SODIUM 1 G: 1 INJECTION, POWDER, LYOPHILIZED, FOR SOLUTION INTRAMUSCULAR; INTRAVENOUS at 13:29

## 2018-09-13 RX ADMIN — METOPROLOL TARTRATE 25 MG: 25 TABLET, FILM COATED ORAL at 08:28

## 2018-09-13 RX ADMIN — MONTELUKAST SODIUM 10 MG: 10 TABLET, COATED ORAL at 08:28

## 2018-09-13 RX ADMIN — CEFEPIME 2 G: 2 INJECTION, POWDER, FOR SOLUTION INTRAVENOUS at 08:28

## 2018-09-13 RX ADMIN — APIXABAN 5 MG: 5 TABLET, FILM COATED ORAL at 20:34

## 2018-09-13 RX ADMIN — ARFORMOTEROL TARTRATE 15 MCG: 15 SOLUTION RESPIRATORY (INHALATION) at 19:23

## 2018-09-13 RX ADMIN — OXYCODONE HYDROCHLORIDE 5 MG: 5 TABLET ORAL at 15:03

## 2018-09-13 RX ADMIN — FLUOXETINE 20 MG: 20 CAPSULE ORAL at 20:34

## 2018-09-13 RX ADMIN — DOCUSATE SODIUM 100 MG: 100 CAPSULE, LIQUID FILLED ORAL at 08:28

## 2018-09-13 RX ADMIN — MORPHINE SULFATE 30 MG: 30 TABLET, EXTENDED RELEASE ORAL at 20:33

## 2018-09-13 RX ADMIN — FLUOXETINE 40 MG: 20 CAPSULE ORAL at 06:06

## 2018-09-13 RX ADMIN — ONDANSETRON 4 MG: 4 TABLET, ORALLY DISINTEGRATING ORAL at 13:26

## 2018-09-13 NOTE — OUTREACH NOTE
Sepsis Week 3 Survey      Responses   Facility patient discharged from?  [facility]   Does the patient have one of the following disease processes/diagnoses(primary or secondary)?  Sepsis   Week 3 attempt successful?  No   Revoke  Readmitted          Kay Maciel RN

## 2018-09-14 ENCOUNTER — APPOINTMENT (OUTPATIENT)
Dept: CT IMAGING | Facility: HOSPITAL | Age: 68
End: 2018-09-14

## 2018-09-14 LAB
ALBUMIN SERPL-MCNC: 3.9 G/DL (ref 3.4–4.8)
ALBUMIN/GLOB SERPL: 1.3 G/DL (ref 1.5–2.5)
ALP SERPL-CCNC: 304 U/L (ref 40–129)
ALT SERPL W P-5'-P-CCNC: 180 U/L (ref 10–44)
ANION GAP SERPL CALCULATED.3IONS-SCNC: 6.5 MMOL/L (ref 3.6–11.2)
AST SERPL-CCNC: 197 U/L (ref 10–34)
BASOPHILS # BLD AUTO: 0.02 10*3/MM3 (ref 0–0.3)
BASOPHILS NFR BLD AUTO: 0.6 % (ref 0–2)
BILIRUB SERPL-MCNC: 1 MG/DL (ref 0.2–1.8)
BUN BLD-MCNC: 15 MG/DL (ref 7–21)
BUN/CREAT SERPL: 10.5 (ref 7–25)
CALCIUM SPEC-SCNC: 9.7 MG/DL (ref 7.7–10)
CHLORIDE SERPL-SCNC: 103 MMOL/L (ref 99–112)
CO2 SERPL-SCNC: 26.5 MMOL/L (ref 24.3–31.9)
CREAT BLD-MCNC: 1.43 MG/DL (ref 0.43–1.29)
CRP SERPL-MCNC: 1.92 MG/DL (ref 0–0.99)
DEPRECATED RDW RBC AUTO: 43.4 FL (ref 37–54)
EOSINOPHIL # BLD AUTO: 0.12 10*3/MM3 (ref 0–0.7)
EOSINOPHIL NFR BLD AUTO: 3.4 % (ref 0–7)
ERYTHROCYTE [DISTWIDTH] IN BLOOD BY AUTOMATED COUNT: 13.4 % (ref 11.5–14.5)
GFR SERPL CREATININE-BSD FRML MDRD: 49 ML/MIN/1.73
GLOBULIN UR ELPH-MCNC: 3 GM/DL
GLUCOSE BLD-MCNC: 119 MG/DL (ref 70–110)
GLUCOSE BLDC GLUCOMTR-MCNC: 101 MG/DL (ref 70–130)
GLUCOSE BLDC GLUCOMTR-MCNC: 102 MG/DL (ref 70–130)
GLUCOSE BLDC GLUCOMTR-MCNC: 109 MG/DL (ref 70–130)
GLUCOSE BLDC GLUCOMTR-MCNC: 95 MG/DL (ref 70–130)
HCT VFR BLD AUTO: 36.2 % (ref 42–52)
HGB BLD-MCNC: 12 G/DL (ref 14–18)
IMM GRANULOCYTES # BLD: 0.02 10*3/MM3 (ref 0–0.03)
IMM GRANULOCYTES NFR BLD: 0.6 % (ref 0–0.5)
LYMPHOCYTES # BLD AUTO: 1.27 10*3/MM3 (ref 1–3)
LYMPHOCYTES NFR BLD AUTO: 36.2 % (ref 16–46)
MCH RBC QN AUTO: 29.7 PG (ref 27–33)
MCHC RBC AUTO-ENTMCNC: 33.1 G/DL (ref 33–37)
MCV RBC AUTO: 89.6 FL (ref 80–94)
MONOCYTES # BLD AUTO: 0.45 10*3/MM3 (ref 0.1–0.9)
MONOCYTES NFR BLD AUTO: 12.8 % (ref 0–12)
NEUTROPHILS # BLD AUTO: 1.63 10*3/MM3 (ref 1.4–6.5)
NEUTROPHILS NFR BLD AUTO: 46.4 % (ref 40–75)
OSMOLALITY SERPL CALC.SUM OF ELEC: 273.9 MOSM/KG (ref 273–305)
PLATELET # BLD AUTO: 148 10*3/MM3 (ref 130–400)
PMV BLD AUTO: 10 FL (ref 6–10)
POTASSIUM BLD-SCNC: 4.3 MMOL/L (ref 3.5–5.3)
PROT SERPL-MCNC: 6.9 G/DL (ref 6–8)
RBC # BLD AUTO: 4.04 10*6/MM3 (ref 4.7–6.1)
SODIUM BLD-SCNC: 136 MMOL/L (ref 135–153)
WBC NRBC COR # BLD: 3.51 10*3/MM3 (ref 4.5–12.5)

## 2018-09-14 PROCEDURE — 80053 COMPREHEN METABOLIC PANEL: CPT | Performed by: INTERNAL MEDICINE

## 2018-09-14 PROCEDURE — 25010000002 ERTAPENEM 1 GM/100ML SOLUTION

## 2018-09-14 PROCEDURE — 86140 C-REACTIVE PROTEIN: CPT | Performed by: NURSE PRACTITIONER

## 2018-09-14 PROCEDURE — 82962 GLUCOSE BLOOD TEST: CPT

## 2018-09-14 PROCEDURE — 94799 UNLISTED PULMONARY SVC/PX: CPT

## 2018-09-14 PROCEDURE — 85025 COMPLETE CBC W/AUTO DIFF WBC: CPT | Performed by: INTERNAL MEDICINE

## 2018-09-14 PROCEDURE — 74150 CT ABDOMEN W/O CONTRAST: CPT

## 2018-09-14 PROCEDURE — 99233 SBSQ HOSP IP/OBS HIGH 50: CPT | Performed by: INTERNAL MEDICINE

## 2018-09-14 PROCEDURE — 74150 CT ABDOMEN W/O CONTRAST: CPT | Performed by: RADIOLOGY

## 2018-09-14 RX ADMIN — PANTOPRAZOLE SODIUM 40 MG: 40 TABLET, DELAYED RELEASE ORAL at 05:55

## 2018-09-14 RX ADMIN — OXYCODONE HYDROCHLORIDE 5 MG: 5 TABLET ORAL at 18:24

## 2018-09-14 RX ADMIN — MONTELUKAST SODIUM 10 MG: 10 TABLET, COATED ORAL at 09:03

## 2018-09-14 RX ADMIN — BUDESONIDE 0.5 MG: 0.5 SUSPENSION RESPIRATORY (INHALATION) at 18:51

## 2018-09-14 RX ADMIN — ONDANSETRON 4 MG: 4 TABLET, ORALLY DISINTEGRATING ORAL at 04:22

## 2018-09-14 RX ADMIN — FLUOXETINE 20 MG: 20 CAPSULE ORAL at 21:19

## 2018-09-14 RX ADMIN — DOCUSATE SODIUM 100 MG: 100 CAPSULE, LIQUID FILLED ORAL at 09:03

## 2018-09-14 RX ADMIN — ERTAPENEM SODIUM 1 G: 1 INJECTION, POWDER, LYOPHILIZED, FOR SOLUTION INTRAMUSCULAR; INTRAVENOUS at 11:16

## 2018-09-14 RX ADMIN — IPRATROPIUM BROMIDE AND ALBUTEROL SULFATE 3 ML: .5; 3 SOLUTION RESPIRATORY (INHALATION) at 18:51

## 2018-09-14 RX ADMIN — IPRATROPIUM BROMIDE AND ALBUTEROL SULFATE 3 ML: .5; 3 SOLUTION RESPIRATORY (INHALATION) at 07:15

## 2018-09-14 RX ADMIN — ARFORMOTEROL TARTRATE 15 MCG: 15 SOLUTION RESPIRATORY (INHALATION) at 20:58

## 2018-09-14 RX ADMIN — FLUOXETINE 40 MG: 20 CAPSULE ORAL at 05:56

## 2018-09-14 RX ADMIN — SODIUM CHLORIDE 50 ML/HR: 9 INJECTION, SOLUTION INTRAVENOUS at 16:00

## 2018-09-14 RX ADMIN — BUDESONIDE 0.5 MG: 0.5 SUSPENSION RESPIRATORY (INHALATION) at 11:35

## 2018-09-14 RX ADMIN — POLYETHYLENE GLYCOL (3350) 17 G: 17 POWDER, FOR SOLUTION ORAL at 09:04

## 2018-09-14 RX ADMIN — FLECAINIDE ACETATE 50 MG: 50 TABLET ORAL at 09:03

## 2018-09-14 RX ADMIN — APIXABAN 5 MG: 5 TABLET, FILM COATED ORAL at 09:04

## 2018-09-14 RX ADMIN — SODIUM CHLORIDE 75 ML/HR: 9 INJECTION, SOLUTION INTRAVENOUS at 01:18

## 2018-09-14 RX ADMIN — CHOLECALCIFEROL TAB 10 MCG (400 UNIT) 1000 UNITS: 10 TAB at 09:03

## 2018-09-14 RX ADMIN — MORPHINE SULFATE 30 MG: 30 TABLET, EXTENDED RELEASE ORAL at 21:19

## 2018-09-14 RX ADMIN — DOCUSATE SODIUM 100 MG: 100 CAPSULE, LIQUID FILLED ORAL at 21:19

## 2018-09-14 RX ADMIN — MORPHINE SULFATE 30 MG: 30 TABLET, EXTENDED RELEASE ORAL at 09:03

## 2018-09-14 RX ADMIN — FLECAINIDE ACETATE 50 MG: 50 TABLET ORAL at 21:19

## 2018-09-14 RX ADMIN — ARFORMOTEROL TARTRATE 15 MCG: 15 SOLUTION RESPIRATORY (INHALATION) at 11:35

## 2018-09-14 RX ADMIN — METOPROLOL TARTRATE 25 MG: 25 TABLET, FILM COATED ORAL at 09:03

## 2018-09-14 RX ADMIN — IPRATROPIUM BROMIDE AND ALBUTEROL SULFATE 3 ML: .5; 3 SOLUTION RESPIRATORY (INHALATION) at 00:42

## 2018-09-14 RX ADMIN — ONDANSETRON 4 MG: 4 TABLET, ORALLY DISINTEGRATING ORAL at 11:20

## 2018-09-14 RX ADMIN — ONDANSETRON 4 MG: 4 TABLET, ORALLY DISINTEGRATING ORAL at 18:27

## 2018-09-14 RX ADMIN — OXYCODONE HYDROCHLORIDE 5 MG: 5 TABLET ORAL at 01:18

## 2018-09-14 RX ADMIN — IPRATROPIUM BROMIDE AND ALBUTEROL SULFATE 3 ML: .5; 3 SOLUTION RESPIRATORY (INHALATION) at 14:54

## 2018-09-14 RX ADMIN — APIXABAN 5 MG: 5 TABLET, FILM COATED ORAL at 21:55

## 2018-09-14 NOTE — PROGRESS NOTES
I called and spoke with patient's daughter yesterday. Daughter knows that EUS will be rescheduled due to hospital admit with positive blood cultures with E. Coli. Olya from Dr. Browning's office called me yesterday and said that we would reschedule EUS after patient was discharged from [facility] in [place]. Daughter knows to call if they need any navigational assistance. AG

## 2018-09-15 LAB
ALBUMIN SERPL-MCNC: 3.9 G/DL (ref 3.4–4.8)
ALBUMIN/GLOB SERPL: 1.3 G/DL (ref 1.5–2.5)
ALP SERPL-CCNC: 265 U/L (ref 40–129)
ALT SERPL W P-5'-P-CCNC: 143 U/L (ref 10–44)
ANION GAP SERPL CALCULATED.3IONS-SCNC: 9.1 MMOL/L (ref 3.6–11.2)
AST SERPL-CCNC: 114 U/L (ref 10–34)
BASOPHILS # BLD AUTO: 0.02 10*3/MM3 (ref 0–0.3)
BASOPHILS NFR BLD AUTO: 0.4 % (ref 0–2)
BILIRUB SERPL-MCNC: 0.7 MG/DL (ref 0.2–1.8)
BUN BLD-MCNC: 12 MG/DL (ref 7–21)
BUN/CREAT SERPL: 8.3 (ref 7–25)
CALCIUM SPEC-SCNC: 9.7 MG/DL (ref 7.7–10)
CHLORIDE SERPL-SCNC: 103 MMOL/L (ref 99–112)
CO2 SERPL-SCNC: 23.9 MMOL/L (ref 24.3–31.9)
CREAT BLD-MCNC: 1.45 MG/DL (ref 0.43–1.29)
CRP SERPL-MCNC: 1.24 MG/DL (ref 0–0.99)
DEPRECATED RDW RBC AUTO: 44.3 FL (ref 37–54)
EOSINOPHIL # BLD AUTO: 0.31 10*3/MM3 (ref 0–0.7)
EOSINOPHIL NFR BLD AUTO: 6.9 % (ref 0–7)
ERYTHROCYTE [DISTWIDTH] IN BLOOD BY AUTOMATED COUNT: 13.7 % (ref 11.5–14.5)
GFR SERPL CREATININE-BSD FRML MDRD: 48 ML/MIN/1.73
GLOBULIN UR ELPH-MCNC: 3 GM/DL
GLUCOSE BLD-MCNC: 107 MG/DL (ref 70–110)
GLUCOSE BLDC GLUCOMTR-MCNC: 100 MG/DL (ref 70–130)
GLUCOSE BLDC GLUCOMTR-MCNC: 106 MG/DL (ref 70–130)
GLUCOSE BLDC GLUCOMTR-MCNC: 114 MG/DL (ref 70–130)
GLUCOSE BLDC GLUCOMTR-MCNC: 130 MG/DL (ref 70–130)
HAV IGM SERPL QL IA: NORMAL
HBV CORE IGM SERPL QL IA: NORMAL
HBV SURFACE AG SERPL QL IA: NORMAL
HCT VFR BLD AUTO: 37 % (ref 42–52)
HCV AB SER DONR QL: NORMAL
HGB BLD-MCNC: 12.3 G/DL (ref 14–18)
IMM GRANULOCYTES # BLD: 0.04 10*3/MM3 (ref 0–0.03)
IMM GRANULOCYTES NFR BLD: 0.9 % (ref 0–0.5)
LYMPHOCYTES # BLD AUTO: 1.77 10*3/MM3 (ref 1–3)
LYMPHOCYTES NFR BLD AUTO: 39.2 % (ref 16–46)
MCH RBC QN AUTO: 30.2 PG (ref 27–33)
MCHC RBC AUTO-ENTMCNC: 33.2 G/DL (ref 33–37)
MCV RBC AUTO: 90.9 FL (ref 80–94)
MONOCYTES # BLD AUTO: 0.6 10*3/MM3 (ref 0.1–0.9)
MONOCYTES NFR BLD AUTO: 13.3 % (ref 0–12)
NEUTROPHILS # BLD AUTO: 1.77 10*3/MM3 (ref 1.4–6.5)
NEUTROPHILS NFR BLD AUTO: 39.3 % (ref 40–75)
OSMOLALITY SERPL CALC.SUM OF ELEC: 272.2 MOSM/KG (ref 273–305)
PLATELET # BLD AUTO: 144 10*3/MM3 (ref 130–400)
PMV BLD AUTO: 10.1 FL (ref 6–10)
POTASSIUM BLD-SCNC: 4 MMOL/L (ref 3.5–5.3)
PROT SERPL-MCNC: 6.9 G/DL (ref 6–8)
RBC # BLD AUTO: 4.07 10*6/MM3 (ref 4.7–6.1)
SODIUM BLD-SCNC: 136 MMOL/L (ref 135–153)
WBC NRBC COR # BLD: 4.51 10*3/MM3 (ref 4.5–12.5)

## 2018-09-15 PROCEDURE — 94799 UNLISTED PULMONARY SVC/PX: CPT

## 2018-09-15 PROCEDURE — 82962 GLUCOSE BLOOD TEST: CPT

## 2018-09-15 PROCEDURE — 25010000002 ERTAPENEM 1 GM/100ML SOLUTION

## 2018-09-15 PROCEDURE — 85025 COMPLETE CBC W/AUTO DIFF WBC: CPT | Performed by: INTERNAL MEDICINE

## 2018-09-15 PROCEDURE — 86140 C-REACTIVE PROTEIN: CPT | Performed by: INTERNAL MEDICINE

## 2018-09-15 PROCEDURE — 80053 COMPREHEN METABOLIC PANEL: CPT | Performed by: INTERNAL MEDICINE

## 2018-09-15 PROCEDURE — 80074 ACUTE HEPATITIS PANEL: CPT | Performed by: INTERNAL MEDICINE

## 2018-09-15 PROCEDURE — 99232 SBSQ HOSP IP/OBS MODERATE 35: CPT | Performed by: INTERNAL MEDICINE

## 2018-09-15 RX ADMIN — APIXABAN 5 MG: 5 TABLET, FILM COATED ORAL at 09:40

## 2018-09-15 RX ADMIN — MORPHINE SULFATE 30 MG: 30 TABLET, EXTENDED RELEASE ORAL at 22:27

## 2018-09-15 RX ADMIN — BUDESONIDE 0.5 MG: 0.5 SUSPENSION RESPIRATORY (INHALATION) at 06:51

## 2018-09-15 RX ADMIN — CHOLECALCIFEROL TAB 10 MCG (400 UNIT) 1000 UNITS: 10 TAB at 09:40

## 2018-09-15 RX ADMIN — FLUOXETINE 40 MG: 20 CAPSULE ORAL at 05:34

## 2018-09-15 RX ADMIN — FLUOXETINE 20 MG: 20 CAPSULE ORAL at 22:28

## 2018-09-15 RX ADMIN — IPRATROPIUM BROMIDE AND ALBUTEROL SULFATE 3 ML: .5; 3 SOLUTION RESPIRATORY (INHALATION) at 00:20

## 2018-09-15 RX ADMIN — POLYETHYLENE GLYCOL (3350) 17 G: 17 POWDER, FOR SOLUTION ORAL at 09:40

## 2018-09-15 RX ADMIN — METOPROLOL TARTRATE 25 MG: 25 TABLET, FILM COATED ORAL at 09:40

## 2018-09-15 RX ADMIN — ARFORMOTEROL TARTRATE 15 MCG: 15 SOLUTION RESPIRATORY (INHALATION) at 11:07

## 2018-09-15 RX ADMIN — IPRATROPIUM BROMIDE AND ALBUTEROL SULFATE 3 ML: .5; 3 SOLUTION RESPIRATORY (INHALATION) at 18:51

## 2018-09-15 RX ADMIN — APIXABAN 5 MG: 5 TABLET, FILM COATED ORAL at 22:28

## 2018-09-15 RX ADMIN — PANTOPRAZOLE SODIUM 40 MG: 40 TABLET, DELAYED RELEASE ORAL at 05:34

## 2018-09-15 RX ADMIN — MONTELUKAST SODIUM 10 MG: 10 TABLET, COATED ORAL at 09:40

## 2018-09-15 RX ADMIN — IPRATROPIUM BROMIDE AND ALBUTEROL SULFATE 3 ML: .5; 3 SOLUTION RESPIRATORY (INHALATION) at 06:51

## 2018-09-15 RX ADMIN — FLECAINIDE ACETATE 50 MG: 50 TABLET ORAL at 22:28

## 2018-09-15 RX ADMIN — DOCUSATE SODIUM 100 MG: 100 CAPSULE, LIQUID FILLED ORAL at 09:40

## 2018-09-15 RX ADMIN — MORPHINE SULFATE 30 MG: 30 TABLET, EXTENDED RELEASE ORAL at 09:40

## 2018-09-15 RX ADMIN — DOCUSATE SODIUM 100 MG: 100 CAPSULE, LIQUID FILLED ORAL at 22:28

## 2018-09-15 RX ADMIN — IPRATROPIUM BROMIDE AND ALBUTEROL SULFATE 3 ML: .5; 3 SOLUTION RESPIRATORY (INHALATION) at 13:32

## 2018-09-15 RX ADMIN — FLECAINIDE ACETATE 50 MG: 50 TABLET ORAL at 09:40

## 2018-09-15 RX ADMIN — BUDESONIDE 0.5 MG: 0.5 SUSPENSION RESPIRATORY (INHALATION) at 18:51

## 2018-09-15 RX ADMIN — ERTAPENEM SODIUM 1 G: 1 INJECTION, POWDER, LYOPHILIZED, FOR SOLUTION INTRAMUSCULAR; INTRAVENOUS at 12:09

## 2018-09-16 LAB
ALBUMIN SERPL-MCNC: 4.2 G/DL (ref 3.4–4.8)
ALBUMIN/GLOB SERPL: 1.3 G/DL (ref 1.5–2.5)
ALP SERPL-CCNC: 351 U/L (ref 40–129)
ALT SERPL W P-5'-P-CCNC: 148 U/L (ref 10–44)
ANION GAP SERPL CALCULATED.3IONS-SCNC: 7.5 MMOL/L (ref 3.6–11.2)
AST SERPL-CCNC: 131 U/L (ref 10–34)
BASOPHILS # BLD AUTO: 0.03 10*3/MM3 (ref 0–0.3)
BASOPHILS NFR BLD AUTO: 0.5 % (ref 0–2)
BILIRUB SERPL-MCNC: 0.7 MG/DL (ref 0.2–1.8)
BUN BLD-MCNC: 11 MG/DL (ref 7–21)
BUN/CREAT SERPL: 7.2 (ref 7–25)
CALCIUM SPEC-SCNC: 9.8 MG/DL (ref 7.7–10)
CHLORIDE SERPL-SCNC: 102 MMOL/L (ref 99–112)
CO2 SERPL-SCNC: 24.5 MMOL/L (ref 24.3–31.9)
CREAT BLD-MCNC: 1.52 MG/DL (ref 0.43–1.29)
CRP SERPL-MCNC: 0.68 MG/DL (ref 0–0.99)
DEPRECATED RDW RBC AUTO: 44.8 FL (ref 37–54)
EOSINOPHIL # BLD AUTO: 0.32 10*3/MM3 (ref 0–0.7)
EOSINOPHIL NFR BLD AUTO: 5.5 % (ref 0–7)
ERYTHROCYTE [DISTWIDTH] IN BLOOD BY AUTOMATED COUNT: 13.8 % (ref 11.5–14.5)
GFR SERPL CREATININE-BSD FRML MDRD: 46 ML/MIN/1.73
GLOBULIN UR ELPH-MCNC: 3.2 GM/DL
GLUCOSE BLD-MCNC: 119 MG/DL (ref 70–110)
GLUCOSE BLDC GLUCOMTR-MCNC: 105 MG/DL (ref 70–130)
GLUCOSE BLDC GLUCOMTR-MCNC: 105 MG/DL (ref 70–130)
GLUCOSE BLDC GLUCOMTR-MCNC: 125 MG/DL (ref 70–130)
HCT VFR BLD AUTO: 39.9 % (ref 42–52)
HGB BLD-MCNC: 13 G/DL (ref 14–18)
IMM GRANULOCYTES # BLD: 0.07 10*3/MM3 (ref 0–0.03)
IMM GRANULOCYTES NFR BLD: 1.2 % (ref 0–0.5)
LYMPHOCYTES # BLD AUTO: 2.12 10*3/MM3 (ref 1–3)
LYMPHOCYTES NFR BLD AUTO: 36.5 % (ref 16–46)
MCH RBC QN AUTO: 29.6 PG (ref 27–33)
MCHC RBC AUTO-ENTMCNC: 32.6 G/DL (ref 33–37)
MCV RBC AUTO: 90.9 FL (ref 80–94)
MONOCYTES # BLD AUTO: 0.67 10*3/MM3 (ref 0.1–0.9)
MONOCYTES NFR BLD AUTO: 11.5 % (ref 0–12)
NEUTROPHILS # BLD AUTO: 2.6 10*3/MM3 (ref 1.4–6.5)
NEUTROPHILS NFR BLD AUTO: 44.8 % (ref 40–75)
OSMOLALITY SERPL CALC.SUM OF ELEC: 268.8 MOSM/KG (ref 273–305)
PLATELET # BLD AUTO: 155 10*3/MM3 (ref 130–400)
PMV BLD AUTO: 9.8 FL (ref 6–10)
POTASSIUM BLD-SCNC: 4.1 MMOL/L (ref 3.5–5.3)
PROT SERPL-MCNC: 7.4 G/DL (ref 6–8)
RBC # BLD AUTO: 4.39 10*6/MM3 (ref 4.7–6.1)
SODIUM BLD-SCNC: 134 MMOL/L (ref 135–153)
WBC NRBC COR # BLD: 5.81 10*3/MM3 (ref 4.5–12.5)

## 2018-09-16 PROCEDURE — 99232 SBSQ HOSP IP/OBS MODERATE 35: CPT | Performed by: INTERNAL MEDICINE

## 2018-09-16 PROCEDURE — 85025 COMPLETE CBC W/AUTO DIFF WBC: CPT | Performed by: INTERNAL MEDICINE

## 2018-09-16 PROCEDURE — 94799 UNLISTED PULMONARY SVC/PX: CPT

## 2018-09-16 PROCEDURE — 86140 C-REACTIVE PROTEIN: CPT | Performed by: NURSE PRACTITIONER

## 2018-09-16 PROCEDURE — 25010000002 ERTAPENEM 1 GM/100ML SOLUTION

## 2018-09-16 PROCEDURE — 80053 COMPREHEN METABOLIC PANEL: CPT | Performed by: INTERNAL MEDICINE

## 2018-09-16 PROCEDURE — 82962 GLUCOSE BLOOD TEST: CPT

## 2018-09-16 RX ORDER — IPRATROPIUM BROMIDE AND ALBUTEROL SULFATE 2.5; .5 MG/3ML; MG/3ML
3 SOLUTION RESPIRATORY (INHALATION) EVERY 6 HOURS PRN
Status: DISCONTINUED | OUTPATIENT
Start: 2018-09-16 | End: 2018-09-17 | Stop reason: HOSPADM

## 2018-09-16 RX ADMIN — APIXABAN 5 MG: 5 TABLET, FILM COATED ORAL at 21:31

## 2018-09-16 RX ADMIN — OXYCODONE HYDROCHLORIDE 5 MG: 5 TABLET ORAL at 00:49

## 2018-09-16 RX ADMIN — BUDESONIDE 0.5 MG: 0.5 SUSPENSION RESPIRATORY (INHALATION) at 06:47

## 2018-09-16 RX ADMIN — ARFORMOTEROL TARTRATE 15 MCG: 15 SOLUTION RESPIRATORY (INHALATION) at 06:47

## 2018-09-16 RX ADMIN — DOCUSATE SODIUM 100 MG: 100 CAPSULE, LIQUID FILLED ORAL at 21:30

## 2018-09-16 RX ADMIN — DOCUSATE SODIUM 100 MG: 100 CAPSULE, LIQUID FILLED ORAL at 08:41

## 2018-09-16 RX ADMIN — POLYETHYLENE GLYCOL (3350) 17 G: 17 POWDER, FOR SOLUTION ORAL at 08:41

## 2018-09-16 RX ADMIN — ERTAPENEM SODIUM 1 G: 1 INJECTION, POWDER, LYOPHILIZED, FOR SOLUTION INTRAMUSCULAR; INTRAVENOUS at 11:21

## 2018-09-16 RX ADMIN — ARFORMOTEROL TARTRATE 15 MCG: 15 SOLUTION RESPIRATORY (INHALATION) at 18:55

## 2018-09-16 RX ADMIN — APIXABAN 5 MG: 5 TABLET, FILM COATED ORAL at 08:40

## 2018-09-16 RX ADMIN — ONDANSETRON 4 MG: 4 TABLET, ORALLY DISINTEGRATING ORAL at 21:32

## 2018-09-16 RX ADMIN — FLUOXETINE 20 MG: 20 CAPSULE ORAL at 21:30

## 2018-09-16 RX ADMIN — ONDANSETRON 4 MG: 4 TABLET, ORALLY DISINTEGRATING ORAL at 08:41

## 2018-09-16 RX ADMIN — MORPHINE SULFATE 30 MG: 30 TABLET, EXTENDED RELEASE ORAL at 08:40

## 2018-09-16 RX ADMIN — FLUOXETINE 40 MG: 20 CAPSULE ORAL at 06:34

## 2018-09-16 RX ADMIN — IPRATROPIUM BROMIDE AND ALBUTEROL SULFATE 3 ML: .5; 3 SOLUTION RESPIRATORY (INHALATION) at 00:14

## 2018-09-16 RX ADMIN — POLYETHYLENE GLYCOL 3350 17 G: 17 POWDER, FOR SOLUTION ORAL at 21:30

## 2018-09-16 RX ADMIN — FLECAINIDE ACETATE 50 MG: 50 TABLET ORAL at 21:31

## 2018-09-16 RX ADMIN — MONTELUKAST SODIUM 10 MG: 10 TABLET, COATED ORAL at 08:41

## 2018-09-16 RX ADMIN — METOPROLOL TARTRATE 25 MG: 25 TABLET, FILM COATED ORAL at 08:41

## 2018-09-16 RX ADMIN — PANTOPRAZOLE SODIUM 40 MG: 40 TABLET, DELAYED RELEASE ORAL at 06:34

## 2018-09-16 RX ADMIN — CHOLECALCIFEROL TAB 10 MCG (400 UNIT) 1000 UNITS: 10 TAB at 08:40

## 2018-09-16 RX ADMIN — BUDESONIDE 0.5 MG: 0.5 SUSPENSION RESPIRATORY (INHALATION) at 18:55

## 2018-09-16 RX ADMIN — FLECAINIDE ACETATE 50 MG: 50 TABLET ORAL at 08:41

## 2018-09-16 RX ADMIN — ONDANSETRON 4 MG: 4 TABLET, ORALLY DISINTEGRATING ORAL at 01:50

## 2018-09-16 RX ADMIN — MORPHINE SULFATE 30 MG: 30 TABLET, EXTENDED RELEASE ORAL at 21:31

## 2018-09-17 ENCOUNTER — TRANSCRIBE ORDERS (OUTPATIENT)
Dept: INFUSION THERAPY | Facility: HOSPITAL | Age: 68
End: 2018-09-17

## 2018-09-17 VITALS
TEMPERATURE: 98.4 F | HEIGHT: 72 IN | WEIGHT: 225.9 LBS | BODY MASS INDEX: 30.6 KG/M2 | RESPIRATION RATE: 18 BRPM | DIASTOLIC BLOOD PRESSURE: 82 MMHG | SYSTOLIC BLOOD PRESSURE: 120 MMHG | HEART RATE: 82 BPM | OXYGEN SATURATION: 99 %

## 2018-09-17 DIAGNOSIS — A49.9 ESBL (EXTENDED SPECTRUM BETA-LACTAMASE) PRODUCING BACTERIA INFECTION: Primary | ICD-10-CM

## 2018-09-17 DIAGNOSIS — R78.81 BACTEREMIA: ICD-10-CM

## 2018-09-17 DIAGNOSIS — Z16.12 ESBL (EXTENDED SPECTRUM BETA-LACTAMASE) PRODUCING BACTERIA INFECTION: Primary | ICD-10-CM

## 2018-09-17 LAB
ALBUMIN SERPL-MCNC: 4 G/DL (ref 3.4–4.8)
ALBUMIN/GLOB SERPL: 1.2 G/DL (ref 1.5–2.5)
ALP SERPL-CCNC: 309 U/L (ref 40–129)
ALT SERPL W P-5'-P-CCNC: 100 U/L (ref 10–44)
ANION GAP SERPL CALCULATED.3IONS-SCNC: 8.2 MMOL/L (ref 3.6–11.2)
AST SERPL-CCNC: 63 U/L (ref 10–34)
BACTERIA SPEC AEROBE CULT: NORMAL
BACTERIA SPEC AEROBE CULT: NORMAL
BASOPHILS # BLD AUTO: 0.03 10*3/MM3 (ref 0–0.3)
BASOPHILS NFR BLD AUTO: 0.5 % (ref 0–2)
BILIRUB SERPL-MCNC: 0.8 MG/DL (ref 0.2–1.8)
BUN BLD-MCNC: 12 MG/DL (ref 7–21)
BUN/CREAT SERPL: 8.1 (ref 7–25)
CALCIUM SPEC-SCNC: 10.1 MG/DL (ref 7.7–10)
CHLORIDE SERPL-SCNC: 104 MMOL/L (ref 99–112)
CO2 SERPL-SCNC: 24.8 MMOL/L (ref 24.3–31.9)
CREAT BLD-MCNC: 1.48 MG/DL (ref 0.43–1.29)
CRP SERPL-MCNC: <0.5 MG/DL (ref 0–0.99)
DEPRECATED RDW RBC AUTO: 46 FL (ref 37–54)
EOSINOPHIL # BLD AUTO: 0.49 10*3/MM3 (ref 0–0.7)
EOSINOPHIL NFR BLD AUTO: 8.1 % (ref 0–7)
ERYTHROCYTE [DISTWIDTH] IN BLOOD BY AUTOMATED COUNT: 14 % (ref 11.5–14.5)
GFR SERPL CREATININE-BSD FRML MDRD: 47 ML/MIN/1.73
GLOBULIN UR ELPH-MCNC: 3.3 GM/DL
GLUCOSE BLD-MCNC: 108 MG/DL (ref 70–110)
GLUCOSE BLDC GLUCOMTR-MCNC: 104 MG/DL (ref 70–130)
GLUCOSE BLDC GLUCOMTR-MCNC: 111 MG/DL (ref 70–130)
GLUCOSE BLDC GLUCOMTR-MCNC: 122 MG/DL (ref 70–130)
HCT VFR BLD AUTO: 40.4 % (ref 42–52)
HGB BLD-MCNC: 13.2 G/DL (ref 14–18)
IMM GRANULOCYTES # BLD: 0.1 10*3/MM3 (ref 0–0.03)
IMM GRANULOCYTES NFR BLD: 1.7 % (ref 0–0.5)
LYMPHOCYTES # BLD AUTO: 1.78 10*3/MM3 (ref 1–3)
LYMPHOCYTES NFR BLD AUTO: 29.5 % (ref 16–46)
MCH RBC QN AUTO: 29.9 PG (ref 27–33)
MCHC RBC AUTO-ENTMCNC: 32.7 G/DL (ref 33–37)
MCV RBC AUTO: 91.4 FL (ref 80–94)
MONOCYTES # BLD AUTO: 0.8 10*3/MM3 (ref 0.1–0.9)
MONOCYTES NFR BLD AUTO: 13.2 % (ref 0–12)
NEUTROPHILS # BLD AUTO: 2.84 10*3/MM3 (ref 1.4–6.5)
NEUTROPHILS NFR BLD AUTO: 47 % (ref 40–75)
OSMOLALITY SERPL CALC.SUM OF ELEC: 274.1 MOSM/KG (ref 273–305)
PLATELET # BLD AUTO: 151 10*3/MM3 (ref 130–400)
PMV BLD AUTO: 10.2 FL (ref 6–10)
POTASSIUM BLD-SCNC: 4.2 MMOL/L (ref 3.5–5.3)
PROT SERPL-MCNC: 7.3 G/DL (ref 6–8)
RBC # BLD AUTO: 4.42 10*6/MM3 (ref 4.7–6.1)
SODIUM BLD-SCNC: 137 MMOL/L (ref 135–153)
WBC NRBC COR # BLD: 6.04 10*3/MM3 (ref 4.5–12.5)

## 2018-09-17 PROCEDURE — 82962 GLUCOSE BLOOD TEST: CPT

## 2018-09-17 PROCEDURE — 85025 COMPLETE CBC W/AUTO DIFF WBC: CPT | Performed by: INTERNAL MEDICINE

## 2018-09-17 PROCEDURE — 86140 C-REACTIVE PROTEIN: CPT | Performed by: NURSE PRACTITIONER

## 2018-09-17 PROCEDURE — 80053 COMPREHEN METABOLIC PANEL: CPT | Performed by: INTERNAL MEDICINE

## 2018-09-17 PROCEDURE — 25010000002 ERTAPENEM 1 GM/100ML SOLUTION

## 2018-09-17 PROCEDURE — 94799 UNLISTED PULMONARY SVC/PX: CPT

## 2018-09-17 PROCEDURE — 99239 HOSP IP/OBS DSCHRG MGMT >30: CPT | Performed by: INTERNAL MEDICINE

## 2018-09-17 RX ORDER — ONDANSETRON 4 MG/1
4 TABLET, ORALLY DISINTEGRATING ORAL EVERY 6 HOURS PRN
Qty: 30 TABLET | Refills: 0 | Status: SHIPPED | OUTPATIENT
Start: 2018-09-17 | End: 2018-10-11 | Stop reason: SDUPTHER

## 2018-09-17 RX ADMIN — METOPROLOL TARTRATE 25 MG: 25 TABLET, FILM COATED ORAL at 08:34

## 2018-09-17 RX ADMIN — POLYETHYLENE GLYCOL 3350 17 G: 17 POWDER, FOR SOLUTION ORAL at 08:34

## 2018-09-17 RX ADMIN — MORPHINE SULFATE 30 MG: 30 TABLET, EXTENDED RELEASE ORAL at 08:35

## 2018-09-17 RX ADMIN — ARFORMOTEROL TARTRATE 15 MCG: 15 SOLUTION RESPIRATORY (INHALATION) at 07:33

## 2018-09-17 RX ADMIN — MONTELUKAST SODIUM 10 MG: 10 TABLET, COATED ORAL at 08:34

## 2018-09-17 RX ADMIN — APIXABAN 5 MG: 5 TABLET, FILM COATED ORAL at 08:34

## 2018-09-17 RX ADMIN — PANTOPRAZOLE SODIUM 40 MG: 40 TABLET, DELAYED RELEASE ORAL at 06:32

## 2018-09-17 RX ADMIN — DOCUSATE SODIUM 100 MG: 100 CAPSULE, LIQUID FILLED ORAL at 08:34

## 2018-09-17 RX ADMIN — CHOLECALCIFEROL TAB 10 MCG (400 UNIT) 1000 UNITS: 10 TAB at 08:34

## 2018-09-17 RX ADMIN — BUDESONIDE 0.5 MG: 0.5 SUSPENSION RESPIRATORY (INHALATION) at 07:33

## 2018-09-17 RX ADMIN — FLUOXETINE 40 MG: 20 CAPSULE ORAL at 06:32

## 2018-09-17 RX ADMIN — ERTAPENEM SODIUM 1 G: 1 INJECTION, POWDER, LYOPHILIZED, FOR SOLUTION INTRAMUSCULAR; INTRAVENOUS at 11:09

## 2018-09-17 RX ADMIN — FLECAINIDE ACETATE 50 MG: 50 TABLET ORAL at 08:34

## 2018-09-17 RX ADMIN — OXYCODONE HYDROCHLORIDE 5 MG: 5 TABLET ORAL at 06:32

## 2018-09-18 ENCOUNTER — READMISSION MANAGEMENT (OUTPATIENT)
Dept: CALL CENTER | Facility: HOSPITAL | Age: 68
End: 2018-09-18

## 2018-09-18 ENCOUNTER — HOSPITAL ENCOUNTER (OUTPATIENT)
Dept: INFUSION THERAPY | Facility: HOSPITAL | Age: 68
Setting detail: INFUSION SERIES
Discharge: HOME | End: 2018-09-18
Attending: INTERNAL MEDICINE

## 2018-09-18 VITALS
HEIGHT: 72 IN | DIASTOLIC BLOOD PRESSURE: 61 MMHG | SYSTOLIC BLOOD PRESSURE: 106 MMHG | BODY MASS INDEX: 30.2 KG/M2 | HEART RATE: 70 BPM | TEMPERATURE: 98.9 F | WEIGHT: 223 LBS | RESPIRATION RATE: 17 BRPM

## 2018-09-18 DIAGNOSIS — B96.20 BACTEREMIA DUE TO ESCHERICHIA COLI: ICD-10-CM

## 2018-09-18 DIAGNOSIS — R78.81 BACTEREMIA DUE TO ESCHERICHIA COLI: ICD-10-CM

## 2018-09-18 LAB
BACTERIA SPEC AEROBE CULT: NORMAL
BACTERIA SPEC AEROBE CULT: NORMAL

## 2018-09-18 PROCEDURE — 25010000002 ERTAPENEM 1 GM/100ML SOLUTION: Performed by: INTERNAL MEDICINE

## 2018-09-18 PROCEDURE — 96365 THER/PROPH/DIAG IV INF INIT: CPT

## 2018-09-18 RX ADMIN — ERTAPENEM SODIUM 1 G: 1 INJECTION, POWDER, LYOPHILIZED, FOR SOLUTION INTRAMUSCULAR; INTRAVENOUS at 11:06

## 2018-09-18 NOTE — OUTREACH NOTE
Prep Survey      Responses   Facility patient discharged from?  [facility]   Is patient eligible?  Yes   Discharge diagnosis  Community acquired pneumonia   Does the patient have one of the following disease processes/diagnoses(primary or secondary)?  COPD/Pneumonia   Does the patient have Home health ordered?  No   Is there a DME ordered?  No   What DME was ordered?  Pt. has shower chair, wheelchair and C-PAP machine at home   Comments regarding appointments  See AVS   Prep survey completed?  Yes          Tresa Cuellar RN

## 2018-09-18 NOTE — PATIENT INSTRUCTIONS
Ertapenem injection  What is this medicine?  ERTAPENEM (er ta PEN em) is a carbapenem antibiotic. It is used to treat certain kinds of bacterial infections. It will not work for colds, flu, or other viral infections.  This medicine may be used for other purposes; ask your health care provider or pharmacist if you have questions.  COMMON BRAND NAME(S): Invanz  What should I tell my health care provider before I take this medicine?  They need to know if you have any of these conditions:  -brain tumor, lesion  -kidney disease  -seizure disorder  -an unusual or allergic reaction to ertapenem, other antibiotics, amide local anesthetics like lidocaine, or other medicines, foods, dyes, or preservatives  -pregnant or trying to get pregnant  -breast-feeding  How should I use this medicine?  This medicine is infused into a vein or injected deep into a muscle. It is usually given by a health care professional in a hospital or clinic setting.  If you get this medicine at home, you will be taught how to prepare and give this medicine. Use exactly as directed. Take your medicine at regular intervals. Do not take your medicine more often than directed.  It is important that you put your used needles and syringes in a special sharps container. Do not put them in a trash can. If you do not have a sharps container, call your pharmacist or healthcare provider to get one.  Talk to your pediatrician regarding the use of this medicine in children. While this drug may be prescribed for children as young as 3 months old for selected conditions, precautions do apply.  Overdosage: If you think you have taken too much of this medicine contact a poison control center or emergency room at once.  NOTE: This medicine is only for you. Do not share this medicine with others.  What if I miss a dose?  If you miss a dose, take it as soon as you can. If it is almost time for your next dose, take only that dose. Do not take double or extra doses.  What  may interact with this medicine?  -birth control pills  -probenecid  This list may not describe all possible interactions. Give your health care provider a list of all the medicines, herbs, non-prescription drugs, or dietary supplements you use. Also tell them if you smoke, drink alcohol, or use illegal drugs. Some items may interact with your medicine.  What should I watch for while using this medicine?  Tell your doctor or health care professional if your symptoms do not improve or if you get new symptoms. Your doctor will monitor your condition and blood work as needed.  Do not treat diarrhea with over the counter products. Contact your doctor if you have diarrhea that lasts more than 2 days or if it is severe and watery.  What side effects may I notice from receiving this medicine?  Side effects that you should report to your doctor or health care professional as soon as possible:  -allergic reactions like skin rash, itching or hives, swelling of the face, lips, or tongue  -anxiety, confusion, dizzy  -chest pain  -difficulty breathing, wheezing  -edema, swelling  -fever  -irregular heart rate, blood pressure  -pain or difficulty passing urine  -seizures  -unusually weak or tired  -white or red patches in mouth  Side effects that usually do not require medical attention (report to your doctor or health care professional if they continue or are bothersome):  -constipation or diarrhea  -difficulty sleeping  -headache  -nausea, vomiting  -pain, swelling or irritation where injected  -stomach upset  -vaginal itch, irritation  This list may not describe all possible side effects. Call your doctor for medical advice about side effects. You may report side effects to FDA at 0-611-FDA-0272.  Where should I keep my medicine?  Keep out of the reach of children.  You will be instructed on how to store this medicine. Throw away any unused medicine after the expiration date on the label.  NOTE: This sheet is a summary. It may  not cover all possible information. If you have questions about this medicine, talk to your doctor, pharmacist, or health care provider.  © 2018 [employer]/Gold Standard (2014-07-25 07:36:44)  =

## 2018-09-19 ENCOUNTER — HOSPITAL ENCOUNTER (OUTPATIENT)
Dept: INFUSION THERAPY | Facility: HOSPITAL | Age: 68
Setting detail: INFUSION SERIES
Discharge: HOME | End: 2018-09-19
Attending: INTERNAL MEDICINE

## 2018-09-19 VITALS
HEART RATE: 75 BPM | TEMPERATURE: 98.1 F | RESPIRATION RATE: 20 BRPM | DIASTOLIC BLOOD PRESSURE: 76 MMHG | SYSTOLIC BLOOD PRESSURE: 114 MMHG | BODY MASS INDEX: 30.2 KG/M2 | HEIGHT: 72 IN | OXYGEN SATURATION: 98 % | WEIGHT: 223 LBS

## 2018-09-19 DIAGNOSIS — B96.20 BACTEREMIA DUE TO ESCHERICHIA COLI: ICD-10-CM

## 2018-09-19 DIAGNOSIS — R78.81 BACTEREMIA DUE TO ESCHERICHIA COLI: ICD-10-CM

## 2018-09-19 PROCEDURE — 96365 THER/PROPH/DIAG IV INF INIT: CPT

## 2018-09-19 PROCEDURE — 25010000002 ERTAPENEM 1 GM/100ML SOLUTION: Performed by: INTERNAL MEDICINE

## 2018-09-19 RX ADMIN — ERTAPENEM SODIUM 1 G: 1 INJECTION, POWDER, LYOPHILIZED, FOR SOLUTION INTRAMUSCULAR; INTRAVENOUS at 13:04

## 2018-09-19 NOTE — PATIENT INSTRUCTIONS
Ertapenem injection  What is this medicine?  ERTAPENEM (er ta PEN em) is a carbapenem antibiotic. It is used to treat certain kinds of bacterial infections. It will not work for colds, flu, or other viral infections.  This medicine may be used for other purposes; ask your health care provider or pharmacist if you have questions.  COMMON BRAND NAME(S): Invanz  What should I tell my health care provider before I take this medicine?  They need to know if you have any of these conditions:  -brain tumor, lesion  -kidney disease  -seizure disorder  -an unusual or allergic reaction to ertapenem, other antibiotics, amide local anesthetics like lidocaine, or other medicines, foods, dyes, or preservatives  -pregnant or trying to get pregnant  -breast-feeding  How should I use this medicine?  This medicine is infused into a vein or injected deep into a muscle. It is usually given by a health care professional in a hospital or clinic setting.  If you get this medicine at home, you will be taught how to prepare and give this medicine. Use exactly as directed. Take your medicine at regular intervals. Do not take your medicine more often than directed.  It is important that you put your used needles and syringes in a special sharps container. Do not put them in a trash can. If you do not have a sharps container, call your pharmacist or healthcare provider to get one.  Talk to your pediatrician regarding the use of this medicine in children. While this drug may be prescribed for children as young as 3 months old for selected conditions, precautions do apply.  Overdosage: If you think you have taken too much of this medicine contact a poison control center or emergency room at once.  NOTE: This medicine is only for you. Do not share this medicine with others.  What if I miss a dose?  If you miss a dose, take it as soon as you can. If it is almost time for your next dose, take only that dose. Do not take double or extra doses.  What  may interact with this medicine?  -birth control pills  -probenecid  This list may not describe all possible interactions. Give your health care provider a list of all the medicines, herbs, non-prescription drugs, or dietary supplements you use. Also tell them if you smoke, drink alcohol, or use illegal drugs. Some items may interact with your medicine.  What should I watch for while using this medicine?  Tell your doctor or health care professional if your symptoms do not improve or if you get new symptoms. Your doctor will monitor your condition and blood work as needed.  Do not treat diarrhea with over the counter products. Contact your doctor if you have diarrhea that lasts more than 2 days or if it is severe and watery.  What side effects may I notice from receiving this medicine?  Side effects that you should report to your doctor or health care professional as soon as possible:  -allergic reactions like skin rash, itching or hives, swelling of the face, lips, or tongue  -anxiety, confusion, dizzy  -chest pain  -difficulty breathing, wheezing  -edema, swelling  -fever  -irregular heart rate, blood pressure  -pain or difficulty passing urine  -seizures  -unusually weak or tired  -white or red patches in mouth  Side effects that usually do not require medical attention (report to your doctor or health care professional if they continue or are bothersome):  -constipation or diarrhea  -difficulty sleeping  -headache  -nausea, vomiting  -pain, swelling or irritation where injected  -stomach upset  -vaginal itch, irritation  This list may not describe all possible side effects. Call your doctor for medical advice about side effects. You may report side effects to FDA at 9-345-FDA-3117.  Where should I keep my medicine?  Keep out of the reach of children.  You will be instructed on how to store this medicine. Throw away any unused medicine after the expiration date on the label.  NOTE: This sheet is a summary. It may  not cover all possible information. If you have questions about this medicine, talk to your doctor, pharmacist, or health care provider.  © 2018 [employer]vier/Gold Standard (2014-07-25 07:36:44)

## 2018-09-20 ENCOUNTER — HOSPITAL ENCOUNTER (OUTPATIENT)
Dept: INFUSION THERAPY | Facility: HOSPITAL | Age: 68
Setting detail: INFUSION SERIES
Discharge: HOME | End: 2018-09-20
Attending: INTERNAL MEDICINE

## 2018-09-20 ENCOUNTER — READMISSION MANAGEMENT (OUTPATIENT)
Dept: CALL CENTER | Facility: HOSPITAL | Age: 68
End: 2018-09-20

## 2018-09-20 VITALS
TEMPERATURE: 98.8 F | RESPIRATION RATE: 18 BRPM | DIASTOLIC BLOOD PRESSURE: 80 MMHG | SYSTOLIC BLOOD PRESSURE: 126 MMHG | HEART RATE: 78 BPM

## 2018-09-20 DIAGNOSIS — B96.20 BACTEREMIA DUE TO ESCHERICHIA COLI: ICD-10-CM

## 2018-09-20 DIAGNOSIS — R78.81 BACTEREMIA DUE TO ESCHERICHIA COLI: ICD-10-CM

## 2018-09-20 PROCEDURE — 25010000002 ERTAPENEM 1 GM/100ML SOLUTION: Performed by: INTERNAL MEDICINE

## 2018-09-20 PROCEDURE — 96365 THER/PROPH/DIAG IV INF INIT: CPT

## 2018-09-20 RX ADMIN — ERTAPENEM SODIUM 1 G: 1 INJECTION, POWDER, LYOPHILIZED, FOR SOLUTION INTRAMUSCULAR; INTRAVENOUS at 12:19

## 2018-09-20 NOTE — OUTREACH NOTE
COPD/PN Week 1 Survey      Responses   Facility patient discharged from?  [facility]   Does the patient have one of the following disease processes/diagnoses(primary or secondary)?  COPD/Pneumonia   Is there a successful TCM telephone encounter documented?  No   Was the primary reason for admission:  Pneumonia   Week 1 attempt successful?  No   Unsuccessful attempts  Attempt 1          Varsha Wolfe RN

## 2018-09-21 ENCOUNTER — HOSPITAL ENCOUNTER (OUTPATIENT)
Dept: PET IMAGING | Facility: HOSPITAL | Age: 68
Discharge: HOME | End: 2018-09-21
Attending: INTERNAL MEDICINE | Admitting: INTERNAL MEDICINE

## 2018-09-21 ENCOUNTER — HOSPITAL ENCOUNTER (OUTPATIENT)
Dept: INFUSION THERAPY | Facility: HOSPITAL | Age: 68
Setting detail: INFUSION SERIES
Discharge: HOME | End: 2018-09-21
Attending: INTERNAL MEDICINE

## 2018-09-21 VITALS
DIASTOLIC BLOOD PRESSURE: 64 MMHG | SYSTOLIC BLOOD PRESSURE: 99 MMHG | RESPIRATION RATE: 16 BRPM | HEART RATE: 91 BPM | TEMPERATURE: 98.3 F

## 2018-09-21 DIAGNOSIS — R78.81 BACTEREMIA DUE TO ESCHERICHIA COLI: ICD-10-CM

## 2018-09-21 DIAGNOSIS — B96.20 BACTEREMIA DUE TO ESCHERICHIA COLI: ICD-10-CM

## 2018-09-21 PROCEDURE — 25010000002 ERTAPENEM 1 GM/100ML SOLUTION: Performed by: INTERNAL MEDICINE

## 2018-09-21 PROCEDURE — 96365 THER/PROPH/DIAG IV INF INIT: CPT

## 2018-09-21 PROCEDURE — 78815 PET IMAGE W/CT SKULL-THIGH: CPT | Performed by: RADIOLOGY

## 2018-09-21 PROCEDURE — A9552 F18 FDG: HCPCS | Performed by: INTERNAL MEDICINE

## 2018-09-21 PROCEDURE — 78815 PET IMAGE W/CT SKULL-THIGH: CPT

## 2018-09-21 PROCEDURE — 0 FLUDEOXYGLUCOSE F18 SOLUTION: Performed by: INTERNAL MEDICINE

## 2018-09-21 RX ADMIN — FLUDEOXYGLUCOSE F18 1 DOSE: 300 INJECTION INTRAVENOUS at 09:09

## 2018-09-21 RX ADMIN — ERTAPENEM SODIUM 1 G: 1 INJECTION, POWDER, LYOPHILIZED, FOR SOLUTION INTRAMUSCULAR; INTRAVENOUS at 12:47

## 2018-09-21 NOTE — PROGRESS NOTES
DATE :  9/21/2018    DIAGNOSIS:   1.  Suspected pancreatic malignancy, not pathologically confirmed.     2.  Repeated episodes of bacteremia - Klebsiella pneumoniae in August 2018, ESBL E coli in September 2018    CHIEF COMPLAINT:  Abdominal pain   Generalized weakness  Poor appetite    HISTORY OF PRESENT ILLNESS:   Todd Phillips is a very pleasant 68 y.o. male who is being seen today at the request of Dr. Miquel Brody for evaluation and treatment of pancreatic cancer.  Mr. Phillips initially developed symptoms in January of 2018. At that time he had pain in his upper abdomen which would radiate to his back.  In July, the pain intensified.  He was seen in the ER and also by Dr. Su.  He underwent RUQ U/S and then HIDA scan and it was determined he had a dysfunctional gallbladder.  He saw Dr. Downs and had cholecystectomy on 8-10-18.  Pathology showed chronic cholecystitis and an incidental benign lymph node.  He re-presented in the post-operative period with jaundice.  Dr. Su performed ERCP on8-14-18.  He was noted to have a 3 cm irregular tight stricture of the distal common bile duct with proximal biliary ductal dilatation.  Biliary papillotomy and stricture dilatation performed and brushings taken.  A 10 Fr x 5 cm biliary stent was placed.  Brushings were taken but were negative.  There was sl dilatation of the distal pancreatic duct without discrete stricture.   He was discharged with improving jaundice on 8-16.  On 8-21 he represented with sepsis due to Klebsiella pneumonia bacteremia and was admitted.  He then was referred to Dr. Brody for evaluation and treatment.  He has been set up for EUS with biopsy next Friday for what appears to be a primary pancreatic malignancy in the head of the pancreas.  Dr. Brody has referred him for consideration of neoadjuvant therapy.    Mr. Phillips complains today of pain which is not controlled.  He has been taking Morphine 15 mg q12h and norco 5  I tab q6h.  He  isn't sleeping because of the pain.   He previously struggled with abdominal pain but has had diarrhea after every meal since his cholecystectomy.    INTERVAL HISTORY:  Mr. Phillips is here today for follow up of presumed pancreatic cancer and hospital follow up.  He was admitted with ESBL bacteremia and completed antibiotic therapy yesterday. He says he is feeling slightly better but is still weak. He continues to have pain in his abdomen and back as well as night sweats that soak his shirt. His wife says his urine was dark yesterday but he denies dysuria. For pain, he is taking Morphine 30 mg BID and Oxycodone 5 mg 1 tab 1-2 times/daily. This is controlling his pain. His bowels are moving well, he reports nausea without vomiting. He has been taking Zofran 1-2 times per day.  His appetite has been poor. He is currently holding Eliquis since Sunday in preparation of pancreatic biopsy on Friday.       PAST MEDICAL HISTORY:  Past Medical History:   Diagnosis Date   • Abnormal ECG    • Antral gastritis    • Atrial fibrillation (CMS/HCC)     treated with oral blood thinner   • Chest pain    • COPD (chronic obstructive pulmonary disease) (CMS/HCC)    • Coronary artery disease     no stents   • Diabetes mellitus (CMS/HCC)     type 2    • Duodenitis    • Emphysema of lung (CMS/HCC)    • Gallbladder disease     removed    • GERD (gastroesophageal reflux disease)    • Hyperlipidemia    • Hypertension    • Kidney stone    • Kidney stones     had lithotripsy and passed 36 kidney stones as well as had one surgically removed.   • Malignant neoplasm of head of pancreas (CMS/HCC) 9/5/2018   • Palpitations    • Reflux esophagitis    • Renal failure     stage 3 kidney disease       PAST SURGICAL HISTORY:  Past Surgical History:   Procedure Laterality Date   • CARDIAC CATHETERIZATION  11/01/1999   • CARDIOVASCULAR STRESS TEST  09/2012   • CHOLECYSTECTOMY N/A 8/10/2018    Procedure: CHOLECYSTECTOMY LAPAROSCOPIC;  Surgeon: Ronak Downs  MD Sid;  Location: [facility];  Service: General   • CYSTOSCOPY BLADDER STONE LITHOTRIPSY     • ECHO - CONVERTED  2012   • ERCP N/A 2018    Procedure: ENDOSCOPIC RETROGRADE CHOLANGIOPANCREATOGRAPHY WITH PAPILLOTOMY;  Surgeon: Scot Su MD;  Location: [facility];  Service: Gastroenterology   • KIDNEY STONE SURGERY     • KIDNEY STONE SURGERY      open abdominal surgery   • UPPER GASTROINTESTINAL ENDOSCOPY  2012       FAMILY HISTORY:  Family History   Problem Relation Age of Onset   • Heart attack Mother    • Heart disease Mother    • Stroke Mother    • Kidney disease Mother    • Heart failure Mother    • Lung disease Father    • Tuberculosis Father    • Diabetes Sister    • Heart attack Brother    • Heart failure Brother    • Heart disease Brother    • Diabetes Sister    • No Known Problems Brother    • No Known Problems Brother        SOCIAL HISTORY:  Social History     Social History   • Marital status:      Spouse name: N/A   • Number of children: N/A   • Years of education: N/A     Occupational History   • Not on file.     Social History Main Topics   • Smoking status: Never Smoker   • Smokeless tobacco: Never Used   • Alcohol use No   • Drug use: No   • Sexual activity: Defer     Other Topics Concern   • Not on file     Social History Narrative    He is  and lives alone with his wife although they have 3 children together who all live close by. He is retired from the [employer] and was exposed to Agent Orange during [place]. He is a lifelong nonsmoker.         A DIL  of cancer.      REVIEW OF SYSTEMS:   A comprehensive 14 point review of systems was performed.  Significant findings as mentioned above.  All other systems reviewed and are negative.      MEDICATIONS:  The current medication list was reviewed in the EMR    Current Outpatient Prescriptions:   •  albuterol (PROVENTIL HFA;VENTOLIN HFA) 108 (90 BASE) MCG/ACT inhaler, Inhale 2 puffs Every 4 (Four) Hours As  Needed for Wheezing or Shortness of Air., Disp: , Rfl:   •  apixaban (ELIQUIS) 5 MG tablet tablet, Take 1 tablet by mouth Every 12 (Twelve) Hours., Disp: 60 tablet, Rfl: 0  •  cholecalciferol (VITAMIN D3) 1000 units tablet, Take 1,000 Units by mouth Daily., Disp: , Rfl:   •  colchicine 0.6 MG tablet, Take 1 tablet by mouth Daily., Disp: 90 tablet, Rfl: 2  •  ertapenem (INVanz) 1 g/100 mL 0.9% NS VTB (mbp), Infuse 100 mL into a venous catheter Daily for 7 days., Disp: 700 mL, Rfl: 0  •  flecainide (TAMBOCOR) 50 MG tablet, Take 1 tablet by mouth Every 12 (Twelve) Hours., Disp: 60 tablet, Rfl: 0  •  FLUoxetine (PROzac) 20 MG capsule, Take 40 mg by mouth Every Morning., Disp: , Rfl:   •  FLUoxetine (PROzac) 20 MG capsule, Take 20 mg by mouth Every Night., Disp: , Rfl:   •  lansoprazole (PREVACID) 30 MG capsule, Take 30 mg by mouth Daily., Disp: , Rfl:   •  metoprolol tartrate (LOPRESSOR) 50 MG tablet, Take 25 mg by mouth Daily., Disp: , Rfl:   •  mometasone (ASMANEX) 220 MCG/INH inhaler, Inhale 2 puffs Every Night., Disp: , Rfl:   •  montelukast (SINGULAIR) 10 MG tablet, Take 10 mg by mouth Daily., Disp: , Rfl:   •  Morphine (MS CONTIN) 30 MG 12 hr tablet, Take 1 tablet by mouth Every 12 (Twelve) Hours., Disp: 60 tablet, Rfl: 0  •  nitroglycerin (NITROSTAT) 0.4 MG SL tablet, Place 0.4 mg under the tongue as needed for chest pain., Disp: , Rfl:   •  ondansetron ODT (ZOFRAN-ODT) 4 MG disintegrating tablet, Dissolve 1 tablet on tongue Every 6 (Six) Hours As Needed for Nausea or Vomiting., Disp: 30 tablet, Rfl: 0  •  oxyCODONE (ROXICODONE) 5 MG immediate release tablet, Take 5 mg by mouth Every 6 (Six) Hours As Needed for Moderate Pain . Prior to [facility] Admission, Patient was on: can have up to 2 tablets every 6 hours as needed, Disp: , Rfl:   •  pioglitazone (ACTOS) 30 MG tablet, Take 30 mg by mouth Daily., Disp: , Rfl:   •  polyethylene glycol (MIRALAX) packet, Take 17 g by mouth Daily As Needed., Disp: , Rfl:   •  sore  muscle (ICY HOT EXTRA STRENGTH) 10-30 % cream cream, Apply 1 application topically to the appropriate area as directed 3 (Three) Times a Day As Needed (local pain)., Disp: , Rfl:   •  Tiotropium Bromide-Olodaterol 2.5-2.5 MCG/ACT aerosol solution, Inhale 2 puffs Daily., Disp: , Rfl:   No current facility-administered medications for this visit.     ALLERGIES:    Allergies   Allergen Reactions   • Contrast Dye Other (See Comments)     Can't have due to kidney failure per family       PHYSICAL EXAM:  Vitals:    09/25/18 1339   BP: 101/75   Pulse: 102   Resp: 20   Temp: 98.3 °F (36.8 °C)   SpO2: 95%   General:  Awake, alert and oriented, in no distress  HEENT:  Pupils are equal, round and reactive to light and accommodation, Extra-ocular movements full, Oropharyx clear, mucous membranes moist  Neck:  No JVD, thyromegaly or lymphadenopathy  CV:  Regular rate and rhythm, no murmurs, rubs or gallops  Resp:  Lungs are clear to auscultation bilaterally  Abd:  Soft, somewhat tender to palpation over the epigastric and LUQ areas, non-distended, bowel sounds present, no organomegaly or masses.  Surgical incisions well-healed.  Ext:  No clubbing, cyanosis or edema  Lymph:  No cervical, supraclavicular, axillary, inguinal or femoral adenopathy  Neuro:  MS as above, CN II-XII intact, grossly non-focal exam      PATHOLOGY:  08-15-18         ENDOSCOPY:  08-14-18 ERCP with papillotomy, balloon rotation of the biliary stricture, pathology brushings, ileum stent placement ([place])  1.  Irregular 3 cm distal common bile duct stricture concerning for neoplastic disease. Biliary papillotomy, stricture dilatation by  through the scope balloon, cytology brushings performed and 10 French by 5 cm biliary stent placed.    2.  Dilated common hepatic duct to 15 mm when fully contrast filled.  Dilated intrahepatic biliary tree.    3.  No stones proximal to the stricture were identified.    4.  Slight dilatation of the distal pancreatic duct  without a discrete stricture identified.    IMAGIN18 CT Abdomen Pelvis Stone Protocol   Findings  - LOWER THORAX: Clear. No effusions.  - LIVER: Homogeneous. No focal hepatic mass or ductal dilatation.  - GALLBLADDER: MINIMAL STRANDING AROUND REGION OF GALLBLADDER FOSSA  THAT MAY BE POSTSURGICAL.  - PANCREAS: Unremarkable. No mass or ductal dilatation.  - SPLEEN: Homogeneous. No splenomegaly.  - ADRENALS: No mass.  - KIDNEYS: No mass. No obstructive uropathy.  No evidence of urolithiasis.  - GI TRACT: SMALL HIATAL HERNIA.  - PERITONEUM: No free air. No free fluid or loculated fluid collections.  - MESENTERY: Unremarkable.  - LYMPH NODES: No lymphadenopathy.  - VASCULATURE: ATHEROSCLEROTIC VASCULAR CALCIFICATION.  - ABDOMINAL WALL: No focal hernia or mass.  - OTHER: None.  - BLADDER: No focal mass or significant wall thickening  - REPRODUCTIVE: Unremarkable as visualized.  - APPENDIX: Nondistended. No surrounding inflammation.  - BONES: No acute bony abnormality.     IMPRESSION:  - Minimal stranding around region of gallbladder fossa that may be postsurgical.     18 US Liver   FINDINGS:   - Visualized pancreas is unremarkable.   - The gallbladder is absent. No collection identified.   - The CBD measures 10.86754368323 mm    .  - The liver demonstrates normal echogenicity without focal lesion.    - No ascites demonstrated.        IMPRESSION:  - As above.    18 CT Abdomen Pelvis Stone Protocol   FINDINGS:   - Minimal bibasilar atelectasis.  - Hiatal hernia.  - The liver is homogeneous. There is no evidence of focal hepatic mass.  - The spleen is homogeneous.  - Stent is noted traversing the common bile duct.  - 3 mm nodule in the right lung on image 8 of the axial series.  - There is no adrenal enlargement.  - The kidneys show no evidence of hydronephrosis or hydroureter. I do not see any distal ureteral stones.   - Otherwise I do not see any free fluid or walled off fluid collections.  - There is  moderate to large volume stool in the colon.  - There is no evidence of mesenteric or retroperitoneal adenopathy.     IMPRESSION:  1. Tiny nodule in the right lung base.  2. Minimal bibasilar atelectasis.  3. Moderate to large volume stool in the colon.     Other findings as above.    08-22-18 CT Abdomen Pelvis With Contrast   FINDINGS:   - Tiny left basilar nodule measuring 4 mm on image 14 of the axial series.  - Minimal bibasilar atelectasis.  - The liver is homogeneous. There is no evidence of focal hepatic mass  - The spleen is homogeneous  - Indistinct appearance of the pancreatic head. A biliary stent is present.  - Small left adrenal nodule is present.  - The kidneys show no evidence of hydronephrosis or hydroureter. I do not see any distal ureteral stones.   - Otherwise I do not see any free fluid or walled off fluid collections.  - Large volume stool is present in the colon.     IMPRESSION:  1. Slightly enlarged, indistinct appearance of the pancreatic head.  - Underlying neoplasm certainly not excluded but no delineable mass is present. There is a biliary stent.    2. Tiny nodule in the left lung base.    3. Small left adrenal nodule.    4. Hiatal hernia.    5. Moderate to large volume stool in the colon.        09-11-18 CT Chest Without Contrast   FINDINGS:   - Minimal coronary artery calcifications present.  - No pericardial or pleural effusions.  - No parenchymal soft tissue nodules or masses. There are findings of COPD.     IMPRESSION:  - COPD.  - Hiatal hernia.  - Minimal coronary artery calcification.       09-11-18 CT Abdomen Pelvis Without Contrast   FINDINGS:   - Lung bases show mild increased interstitial markings.  - There is a hiatal hernia.  - The liver is homogeneous. There is no evidence of focal hepatic mass.  - The spleen is homogeneous.  - Mildly indistinct appearance of the pancreas. There is a biliary stent present. I cannot exclude mild degree of pancreatitis..  - There is no adrenal  enlargement.  - The kidneys show no evidence of hydronephrosis or hydroureter. I do not see any distal ureteral stones.   - Otherwise I do not see any free fluid or walled off fluid collections.  - Large volume stool is present in the colon.  - Urinary bladder wall is slightly thickened.  - There is no evidence of mesenteric or retroperitoneal adenopathy.    IMPRESSION:  1. Mildly indistinct appearance of the proximal pancreas.  2. Urinary bladder wall thickening.  3. Large volume stool in the colon.    09-14-18 CT Abdomen Without Contrast   FINDINGS:   - Again noted is very mild indistinct appearance of the pancreatic head. Stent is stable in position.  - The liver is stable and homogeneous.  - Spleen is homogeneous.  - No adrenal enlargement.  - Moderate to large volume stool in the colon.     IMPRESSION:  - No significant interval change since the previous exam.     09-21-18 NM Pet Skull Base To Mid Thigh   FINDINGS:      HEAD/NECK:  - No FDG hypermetabolic neck adenopathy.  - No FDG hypermetabolic masses.     CHEST:   - No FDG hypermetabolic thoracic adenopathy.  - No FDG hypermetabolic lung nodules or masses.  - Evaluation for tiny parenchymal nodules is somewhat limited on low dose CT secondary to respiratory motion.     ABDOMEN/PELVIS:   - There is hypermetabolic activity in the pancreatic head with a maximum SUV of 4.971.  - Physiologic FDG hypermetabolism seen throughout the GI tract.  - Physiologic FDG hypermetabolism seen throughout the mesentery, retroperitoneum and pelvis.     BONES:   - There are scattered foci of FDG hypermetabolism most suggestive of degenerative change seen throughout the axial skeleton. If concern persist for metastatic lesions of the bone, HDP Bone scan is recommended for further evaluation.     IMPRESSION:  - Abnormal hypermetabolic activity corresponding to area of indistinctness in the pancreatic head. Maximum SUV is 4.97.    RECENT LABS:  Lab Results   Component Value Date     WBC 6.04 09/17/2018    HGB 13.2 (L) 09/17/2018    HCT 40.4 (L) 09/17/2018    MCV 91.4 09/17/2018    RDW 14.0 09/17/2018     09/17/2018    NEUTRORELPCT 47.0 09/17/2018    LYMPHORELPCT 29.5 09/17/2018    MONORELPCT 13.2 (H) 09/17/2018    EOSRELPCT 8.1 (H) 09/17/2018    BASORELPCT 0.5 09/17/2018    NEUTROABS 2.84 09/17/2018    LYMPHSABS 1.78 09/17/2018       Lab Results   Component Value Date     09/17/2018    K 4.2 09/17/2018    CO2 24.8 09/17/2018     09/17/2018    BUN 12 09/17/2018    CREATININE 1.48 (H) 09/17/2018    EGFRIFNONA 47 (L) 09/17/2018    EGFRIFAFRI  09/02/2016      Comment:      <15 Indicative of kidney failure.    GLUCOSE 108 09/17/2018    CALCIUM 10.1 (H) 09/17/2018    ALKPHOS 309 (H) 09/17/2018    AST 63 (H) 09/17/2018     (H) 09/17/2018    BILITOT 0.8 09/17/2018    ALBUMIN 4.00 09/17/2018    PROTEINTOT 7.3 09/17/2018    MG 1.9 09/11/2018       Lab Results   Component Value Date/Time    URICACID 8.2 (H) 06/20/2018 11:48 AM     Lab Results   Component Value Date     3636 (H) 09/07/2018     4032 (H) 08/15/2018         ASSESSMENT & PLAN:  Todd Phillips is a very pleasant 68 y.o. male with presumed cancer of the head of the pancreas with a malignant appearing stricture in the CBD.    1.  Suspected pancreatic cancer:  -  Imaging shows vague abnormality in the head of the pancreas.  Dr. Brody has arranged for EUS/Bx which is planned now for this Friday.  -   is elevated.  -  PET-CT with hypermetabolism in the head of the pancreas but no other abnormality.  -  Assuming biopsy confirms dx of pancreatic cancer, I have recommended neoadjuvant chemotherapy with Gemcitabine and Abraxane.  Referred back to Dr. Downs for PAC placement, but this is on hold given recent bacteremia.    2.  Recent episodes of bacteremia x 2 - due to Klebsiella pneumoniae in August 2018, ESBL E coli in September.  Etiology is uncertain.  He has some complaints of dark urine but no prostatism  or dysuria.  Will check UA/UC.  Will repeat CBCD, CMP, CRP.  -  ? Possibility of relationship to stent?    3.  Bilary stent in place:  -  Family mention that Dr. Su said this stent was temporary and wouldn't last longer than about 3 months.  Will call Dr. Su to see if further intervention is warranted.      4.   Neoplasm related pain:  Well- controlled.  Continue Morphine ER  30 mg q12h and Oxycodone 5 mg 1-3 tabs po q4h prn pain.  He will keep a log so I can adjust long acting medicaiton as needed.      5.  History of Atrial fibrillation:  Chronically anticoagulated on Eliquis. This has been held for upcoming biopsy.    5.  CKD III:  Will re-check labwork today. Will need to watch kidney function carefully.    6.  Poor appetite and nausea:  Will give trial of Marinol 2.5 mg BID.  Continue Zofran as needed.    7.  ACO Quality measures  - Todd Phillips does not use tobacco products.  - Patient's Body mass index is 29.57 kg/m². BMI is above normal parameters. Recommendations include: none given current problems.  Will address in the future..  - Current outpatient and discharge medications have been reconciled for the patient.  Reviewed by: Gwen Alves MD     This note was scribed for Gwen Alves MD by Tamara Morejon RN.    I, Gwen Alves MD, personally performed the services described in this documentation as scribed by the above named individual in my presence, and it is both accurate and complete.  09/25/2018     I spent 25 minutes with Todd Phillips today.  More than 50% of the time was spent in counseling / coordination of care for the above problems.      Electronically Signed by: Gwen Alves MD      CC:   MD Miquel Quintana MD Aaron House, MD Michael Simons, MD

## 2018-09-22 ENCOUNTER — HOSPITAL ENCOUNTER (OUTPATIENT)
Dept: INFUSION THERAPY | Facility: HOSPITAL | Age: 68
Setting detail: INFUSION SERIES
Discharge: HOME | End: 2018-09-22
Attending: INTERNAL MEDICINE

## 2018-09-22 VITALS
TEMPERATURE: 98.2 F | SYSTOLIC BLOOD PRESSURE: 128 MMHG | DIASTOLIC BLOOD PRESSURE: 79 MMHG | RESPIRATION RATE: 18 BRPM | HEART RATE: 97 BPM

## 2018-09-22 DIAGNOSIS — B96.20 BACTEREMIA DUE TO ESCHERICHIA COLI: ICD-10-CM

## 2018-09-22 DIAGNOSIS — R78.81 BACTEREMIA DUE TO ESCHERICHIA COLI: ICD-10-CM

## 2018-09-22 PROCEDURE — 25010000002 ERTAPENEM 1 GM/100ML SOLUTION: Performed by: INTERNAL MEDICINE

## 2018-09-22 PROCEDURE — 96365 THER/PROPH/DIAG IV INF INIT: CPT

## 2018-09-22 RX ADMIN — ERTAPENEM SODIUM 1 G: 1 INJECTION, POWDER, LYOPHILIZED, FOR SOLUTION INTRAMUSCULAR; INTRAVENOUS at 12:04

## 2018-09-23 ENCOUNTER — HOSPITAL ENCOUNTER (OUTPATIENT)
Dept: INFUSION THERAPY | Facility: HOSPITAL | Age: 68
Setting detail: INFUSION SERIES
Discharge: HOME | End: 2018-09-23
Attending: INTERNAL MEDICINE

## 2018-09-23 VITALS
DIASTOLIC BLOOD PRESSURE: 85 MMHG | TEMPERATURE: 97.9 F | SYSTOLIC BLOOD PRESSURE: 134 MMHG | RESPIRATION RATE: 18 BRPM | HEART RATE: 100 BPM

## 2018-09-23 DIAGNOSIS — R78.81 BACTEREMIA DUE TO ESCHERICHIA COLI: ICD-10-CM

## 2018-09-23 DIAGNOSIS — B96.20 BACTEREMIA DUE TO ESCHERICHIA COLI: ICD-10-CM

## 2018-09-23 PROCEDURE — 96365 THER/PROPH/DIAG IV INF INIT: CPT

## 2018-09-23 PROCEDURE — 25010000002 ERTAPENEM 1 GM/100ML SOLUTION: Performed by: INTERNAL MEDICINE

## 2018-09-23 RX ADMIN — ERTAPENEM SODIUM 1 G: 1 INJECTION, POWDER, LYOPHILIZED, FOR SOLUTION INTRAMUSCULAR; INTRAVENOUS at 10:12

## 2018-09-24 ENCOUNTER — HOSPITAL ENCOUNTER (OUTPATIENT)
Dept: INFUSION THERAPY | Facility: HOSPITAL | Age: 68
Setting detail: INFUSION SERIES
Discharge: HOME | End: 2018-09-24
Attending: INTERNAL MEDICINE

## 2018-09-24 ENCOUNTER — DOCUMENTATION (OUTPATIENT)
Dept: OTHER | Facility: HOSPITAL | Age: 68
End: 2018-09-24

## 2018-09-24 VITALS
SYSTOLIC BLOOD PRESSURE: 127 MMHG | DIASTOLIC BLOOD PRESSURE: 80 MMHG | RESPIRATION RATE: 16 BRPM | TEMPERATURE: 99 F | HEART RATE: 96 BPM

## 2018-09-24 DIAGNOSIS — R78.81 BACTEREMIA DUE TO ESCHERICHIA COLI: ICD-10-CM

## 2018-09-24 DIAGNOSIS — B96.20 BACTEREMIA DUE TO ESCHERICHIA COLI: ICD-10-CM

## 2018-09-24 PROCEDURE — 25010000002 ERTAPENEM 1 GM/100ML SOLUTION: Performed by: INTERNAL MEDICINE

## 2018-09-24 PROCEDURE — 96365 THER/PROPH/DIAG IV INF INIT: CPT

## 2018-09-24 RX ADMIN — ERTAPENEM SODIUM 1 G: 1 INJECTION, POWDER, LYOPHILIZED, FOR SOLUTION INTRAMUSCULAR; INTRAVENOUS at 11:57

## 2018-09-24 NOTE — PROGRESS NOTES
Patient's spouse called to see if patient's EUS is still scheduled for 9/27/2018. I sent Olya at Dr. Browning's office an email to ask here and let her know that patient's last dose of antibiotics will be today. I told spouse that I would return her call after I called the MD office to find out. Spouse verbalized understanding and knows that I will call her back with all information. AG

## 2018-09-24 NOTE — PROGRESS NOTES
I talked with Olya at Dr. Browning's office. She said that she talked with Dr. Browning and patient is scheduled for EUS on September 28th with arrival time of 12:30pm. I also asked spouse to bring patient to the 2nd floor in the [address] taking the [place] [employer]. Spouse knows that patient will be NPO at midnight the night before the scheduled procedure. Spouse said that patient's last dose of Eliquis was yesterday.Spouse knows to call for any other questions. AG

## 2018-09-25 ENCOUNTER — READMISSION MANAGEMENT (OUTPATIENT)
Dept: CALL CENTER | Facility: HOSPITAL | Age: 68
End: 2018-09-25

## 2018-09-25 ENCOUNTER — OFFICE VISIT (OUTPATIENT)
Dept: ONCOLOGY | Facility: CLINIC | Age: 68
End: 2018-09-25

## 2018-09-25 VITALS
TEMPERATURE: 98.3 F | SYSTOLIC BLOOD PRESSURE: 101 MMHG | RESPIRATION RATE: 20 BRPM | HEART RATE: 102 BPM | DIASTOLIC BLOOD PRESSURE: 75 MMHG | BODY MASS INDEX: 29.57 KG/M2 | WEIGHT: 218 LBS | OXYGEN SATURATION: 95 %

## 2018-09-25 DIAGNOSIS — A41.9 SEPSIS, DUE TO UNSPECIFIED ORGANISM: ICD-10-CM

## 2018-09-25 DIAGNOSIS — R97.8 ELEVATED CA 19-9 LEVEL: ICD-10-CM

## 2018-09-25 DIAGNOSIS — N18.30 CKD (CHRONIC KIDNEY DISEASE), STAGE III (HCC): ICD-10-CM

## 2018-09-25 DIAGNOSIS — G89.3 NEOPLASM RELATED PAIN: ICD-10-CM

## 2018-09-25 DIAGNOSIS — C25.0 MALIGNANT NEOPLASM OF HEAD OF PANCREAS (HCC): Primary | ICD-10-CM

## 2018-09-25 DIAGNOSIS — R78.81 BACTEREMIA: ICD-10-CM

## 2018-09-25 LAB
ALBUMIN SERPL-MCNC: 4.3 G/DL (ref 3.4–4.8)
ALBUMIN/GLOB SERPL: 1.3 G/DL (ref 1.5–2.5)
ALP SERPL-CCNC: 474 U/L (ref 40–129)
ALT SERPL W P-5'-P-CCNC: 399 U/L (ref 10–44)
ANION GAP SERPL CALCULATED.3IONS-SCNC: 8.1 MMOL/L (ref 3.6–11.2)
AST SERPL-CCNC: 319 U/L (ref 10–34)
BASOPHILS # BLD AUTO: 0.03 10*3/MM3 (ref 0–0.3)
BASOPHILS NFR BLD AUTO: 0.5 % (ref 0–2)
BILIRUB SERPL-MCNC: 1.1 MG/DL (ref 0.2–1.8)
BILIRUB UR QL STRIP: ABNORMAL
BUN BLD-MCNC: 24 MG/DL (ref 7–21)
BUN/CREAT SERPL: 14.6 (ref 7–25)
CALCIUM SPEC-SCNC: 9.7 MG/DL (ref 7.7–10)
CHLORIDE SERPL-SCNC: 103 MMOL/L (ref 99–112)
CLARITY UR: CLEAR
CO2 SERPL-SCNC: 25.9 MMOL/L (ref 24.3–31.9)
COLOR UR: ABNORMAL
CREAT BLD-MCNC: 1.64 MG/DL (ref 0.43–1.29)
CRP SERPL-MCNC: <0.5 MG/DL (ref 0–0.99)
DEPRECATED RDW RBC AUTO: 44.2 FL (ref 37–54)
EOSINOPHIL # BLD AUTO: 0.13 10*3/MM3 (ref 0–0.7)
EOSINOPHIL NFR BLD AUTO: 2.3 % (ref 0–7)
ERYTHROCYTE [DISTWIDTH] IN BLOOD BY AUTOMATED COUNT: 13.8 % (ref 11.5–14.5)
GFR SERPL CREATININE-BSD FRML MDRD: 42 ML/MIN/1.73
GLOBULIN UR ELPH-MCNC: 3.4 GM/DL
GLUCOSE BLD-MCNC: 126 MG/DL (ref 70–110)
GLUCOSE UR STRIP-MCNC: NEGATIVE MG/DL
HCT VFR BLD AUTO: 40.4 % (ref 42–52)
HGB BLD-MCNC: 13.7 G/DL (ref 14–18)
HGB UR QL STRIP.AUTO: NEGATIVE
IMM GRANULOCYTES # BLD: 0.03 10*3/MM3 (ref 0–0.03)
IMM GRANULOCYTES NFR BLD: 0.5 % (ref 0–0.5)
KETONES UR QL STRIP: ABNORMAL
LEUKOCYTE ESTERASE UR QL STRIP.AUTO: NEGATIVE
LYMPHOCYTES # BLD AUTO: 1.71 10*3/MM3 (ref 1–3)
LYMPHOCYTES NFR BLD AUTO: 30.1 % (ref 16–46)
MCH RBC QN AUTO: 29.9 PG (ref 27–33)
MCHC RBC AUTO-ENTMCNC: 33.9 G/DL (ref 33–37)
MCV RBC AUTO: 88.2 FL (ref 80–94)
MONOCYTES # BLD AUTO: 0.54 10*3/MM3 (ref 0.1–0.9)
MONOCYTES NFR BLD AUTO: 9.5 % (ref 0–12)
NEUTROPHILS # BLD AUTO: 3.24 10*3/MM3 (ref 1.4–6.5)
NEUTROPHILS NFR BLD AUTO: 57.1 % (ref 40–75)
NITRITE UR QL STRIP: NEGATIVE
OSMOLALITY SERPL CALC.SUM OF ELEC: 279.4 MOSM/KG (ref 273–305)
PH UR STRIP.AUTO: <=5 [PH] (ref 5–8)
PLATELET # BLD AUTO: 236 10*3/MM3 (ref 130–400)
PMV BLD AUTO: 10.3 FL (ref 6–10)
POTASSIUM BLD-SCNC: 4.1 MMOL/L (ref 3.5–5.3)
PROT SERPL-MCNC: 7.7 G/DL (ref 6–8)
PROT UR QL STRIP: NEGATIVE
RBC # BLD AUTO: 4.58 10*6/MM3 (ref 4.7–6.1)
SODIUM BLD-SCNC: 137 MMOL/L (ref 135–153)
SP GR UR STRIP: 1.02 (ref 1–1.03)
UROBILINOGEN UR QL STRIP: ABNORMAL
WBC NRBC COR # BLD: 5.68 10*3/MM3 (ref 4.5–12.5)

## 2018-09-25 PROCEDURE — 99214 OFFICE O/P EST MOD 30 MIN: CPT | Performed by: INTERNAL MEDICINE

## 2018-09-25 PROCEDURE — 86140 C-REACTIVE PROTEIN: CPT | Performed by: INTERNAL MEDICINE

## 2018-09-25 PROCEDURE — 86301 IMMUNOASSAY TUMOR CA 19-9: CPT | Performed by: INTERNAL MEDICINE

## 2018-09-25 PROCEDURE — 81003 URINALYSIS AUTO W/O SCOPE: CPT | Performed by: INTERNAL MEDICINE

## 2018-09-25 PROCEDURE — 85025 COMPLETE CBC W/AUTO DIFF WBC: CPT | Performed by: INTERNAL MEDICINE

## 2018-09-25 PROCEDURE — 80053 COMPREHEN METABOLIC PANEL: CPT | Performed by: INTERNAL MEDICINE

## 2018-09-25 RX ORDER — DRONABINOL 2.5 MG/1
2.5 CAPSULE ORAL
Qty: 60 CAPSULE | Refills: 0 | Status: SHIPPED | OUTPATIENT
Start: 2018-09-25 | End: 2018-09-26 | Stop reason: ALTCHOICE

## 2018-09-25 RX ORDER — OXYCODONE HYDROCHLORIDE 5 MG/1
TABLET ORAL
Qty: 200 TABLET | Refills: 0 | Status: SHIPPED | OUTPATIENT
Start: 2018-09-25 | End: 2018-12-07

## 2018-09-25 NOTE — OUTREACH NOTE
COPD/PN Week 1 Survey      Responses   Facility patient discharged from?  [facility]   Does the patient have one of the following disease processes/diagnoses(primary or secondary)?  COPD/Pneumonia   Is there a successful TCM telephone encounter documented?  No   Was the primary reason for admission:  Pneumonia   Week 1 attempt successful?  Yes   Call start time  0846   Call end time  0854   Discharge diagnosis  Community acquired pneumonia, ESBL E coli bacteremia, JOVANNA on CKD III, Nausea, abdominal pain and constipation,COPD, stable with no acute exacerbation     Is patient permission given to speak with other caregiver?  Yes   List who call center can speak with  Emmy Phillips, spouse   Person spoke with today (if not patient) and relationship  Emmy, spouse   Meds reviewed with patient/caregiver?  Yes   Is the patient having any side effects they believe may be caused by any medication additions or changes?  No   Does the patient have all medications ordered at discharge?  Yes   Is the patient taking all medications as directed (includes completed medication regime)?  Yes   Medication comments  Wife states that patient took his last IV antibiotic treatment yesterday.    Does the patient have a primary care provider?   Yes   Does the patient have an appointment with their PCP or pulmonologist within 7 days of discharge?  No   Comments regarding PCP  Dr. Downs in 2 weeks   What is preventing the patient from scheduling follow up appointments within 7 days of discharge?  Haven't had time [Busy with other appts. ]   Nursing Interventions  Educated patient on importance of making appointment   Has the patient kept scheduled appointments due by today?  Yes   Comments   Gwen Alves MD today Tuesday Sep 25, 2018 2:00 PM, Adiel Denney MD Wednesday Sep 26, 2018 9:20 AM , wife states also scheduled for biopsy on Friday 9/28/18.      Has home health visited the patient within 72 hours of discharge?  N/A    Psychosocial issues?  No   Did the patient receive a copy of their discharge instructions?  Yes   Nursing interventions  Reviewed instructions with patient   What is the patient's perception of their health status since discharge?  Same   Nursing Interventions  Nurse provided patient education   Are the patient's immunizations up to date?   Yes   Is the patient/caregiver able to teach back the hierarchy of who to call/visit for symptoms/problems? PCP, Specialist, Home health nurse, Urgent Care, ED, 911  Yes   Is the patient able to teach back COPD zones?  Yes   Nursing interventions  Education provided on various zones   Patient reports what zone on this call?  Green Zone   Green Zone  Breathing without shortness of breath, Usual activity and exercise level, Usual amount of phlegm/mucus without difficulty coughing up, Reports doing well   Green Zone interventions:  Take daily medications, Avoid indoor/outdoor triggers   Is the patient/caregiver able to teach back signs and symptoms of worsening condition:  Fever/chills, Shortness of breath, Chest pain   Is the patient/caregiver able to teach back importance of completing antibiotic course of treatment?  Yes [Wife reports finished IV antibiotics yesterday. ]   Week 1 call completed?  Yes   Wrap up additional comments  Wife reports patients breathing doing well. States biggest complaint is continued abdominal pain. Patient to see oncology Dr today and planned for biopsy on Friday.           Tresa Mejía RN

## 2018-09-26 ENCOUNTER — OFFICE VISIT (OUTPATIENT)
Dept: CARDIOLOGY | Facility: CLINIC | Age: 68
End: 2018-09-26

## 2018-09-26 VITALS
DIASTOLIC BLOOD PRESSURE: 76 MMHG | WEIGHT: 216 LBS | HEART RATE: 90 BPM | OXYGEN SATURATION: 95 % | SYSTOLIC BLOOD PRESSURE: 110 MMHG | HEIGHT: 71 IN | BODY MASS INDEX: 30.24 KG/M2

## 2018-09-26 DIAGNOSIS — N18.30 CKD STAGE 3 SECONDARY TO DIABETES (HCC): ICD-10-CM

## 2018-09-26 DIAGNOSIS — E11.22 CKD STAGE 3 SECONDARY TO DIABETES (HCC): ICD-10-CM

## 2018-09-26 DIAGNOSIS — I48.0 PAROXYSMAL ATRIAL FIBRILLATION (HCC): Primary | ICD-10-CM

## 2018-09-26 DIAGNOSIS — F41.9 ANXIETY AND DEPRESSION: ICD-10-CM

## 2018-09-26 DIAGNOSIS — I10 ESSENTIAL HYPERTENSION: ICD-10-CM

## 2018-09-26 DIAGNOSIS — F32.A ANXIETY AND DEPRESSION: ICD-10-CM

## 2018-09-26 DIAGNOSIS — E78.2 MIXED HYPERLIPIDEMIA: ICD-10-CM

## 2018-09-26 DIAGNOSIS — Z79.01 CHRONIC ANTICOAGULATION: ICD-10-CM

## 2018-09-26 LAB — CANCER AG19-9 SERPL-ACNC: 7602 U/ML (ref 0–35)

## 2018-09-26 PROCEDURE — 99213 OFFICE O/P EST LOW 20 MIN: CPT | Performed by: INTERNAL MEDICINE

## 2018-09-26 NOTE — PROGRESS NOTES
subjective     Chief Complaint   Patient presents with   • Atrial Fibrillation     Follow up   • Follow-up     S/P hospitilization      History of Present Illness  Patient is 68 years old white male who has been having lot of problems lately.  Patient was found to have pancreatic mass which was hyperactive on PET scan.  He is planning to have biopsy done next week.  He has seen Dr. Alves already for possible chemotherapy after biopsy.    Reason also has paroxysmal atrial fibrillation and is currently in sinus rhythm and is anticoagulated with eliquis.  Sinus rhythm was maintained with a Tambocor 50 twice a day.  There are no drug side effects.  Patient has hyperlipidemia but is statin therapy has been put on hold because of abnormal liver functions.  Patient also has lot of anxiety.  He is taking Prozac for quite some time but is under a lot of stress at this time because of possible pancreatic cancer..    Past Surgical History:   Procedure Laterality Date   • CARDIAC CATHETERIZATION  11/01/1999   • CARDIOVASCULAR STRESS TEST  09/2012   • CHOLECYSTECTOMY N/A 8/10/2018    Procedure: CHOLECYSTECTOMY LAPAROSCOPIC;  Surgeon: Ronak Downs MD;  Location: [facility];  Service: General   • CYSTOSCOPY BLADDER STONE LITHOTRIPSY     • ECHO - CONVERTED  09/2012   • ERCP N/A 8/14/2018    Procedure: ENDOSCOPIC RETROGRADE CHOLANGIOPANCREATOGRAPHY WITH PAPILLOTOMY;  Surgeon: Scot Su MD;  Location: [facility];  Service: Gastroenterology   • KIDNEY STONE SURGERY     • KIDNEY STONE SURGERY      open abdominal surgery   • UPPER GASTROINTESTINAL ENDOSCOPY  08/30/2012     Family History   Problem Relation Age of Onset   • Heart attack Mother    • Heart disease Mother    • Stroke Mother    • Kidney disease Mother    • Heart failure Mother    • Lung disease Father    • Tuberculosis Father    • Diabetes Sister    • Heart attack Brother    • Heart failure Brother    • Heart disease Brother    • Diabetes Sister    • No  Known Problems Brother    • No Known Problems Brother      Past Medical History:   Diagnosis Date   • Abnormal ECG    • Antral gastritis    • Atrial fibrillation (CMS/HCC)     treated with oral blood thinner   • Chest pain    • COPD (chronic obstructive pulmonary disease) (CMS/formerly [facility])    • Coronary artery disease     no stents   • Diabetes mellitus (CMS/formerly [facility])     type 2    • Duodenitis    • Emphysema of lung (CMS/formerly [facility])    • Gallbladder disease     removed    • GERD (gastroesophageal reflux disease)    • Hyperlipidemia    • Hypertension    • Kidney stone    • Kidney stones     had lithotripsy and passed 36 kidney stones as well as had one surgically removed.   • Malignant neoplasm of head of pancreas (CMS/formerly [facility]) 9/5/2018   • Palpitations    • Reflux esophagitis    • Renal failure     stage 3 kidney disease     Patient Active Problem List   Diagnosis   • Hyperlipidemia   • COPD (chronic obstructive pulmonary disease) (CMS/formerly [facility])   • Essential hypertension   • Gout without tophus   • Anxiety and depression   • Primary insomnia   • Gastroesophageal reflux disease without esophagitis   • Nonobstructive atherosclerosis of coronary artery   • CKD stage 3 secondary to diabetes (CMS/formerly [facility])   • DM2 (diabetes mellitus, type 2) (CMS/formerly [facility])   • Paroxysmal atrial fibrillation   • Chronic anticoagulation, eliquis   • Encounter for monitoring flecainide therapy   • Preoperative clearance   • Biliary dyskinesia   • Obesity (BMI 30.0-34.9)   • Hyperbilirubinemia   • Cholecystitis   • Malignant neoplasm of head of pancreas (CMS/formerly [facility])   • Bacteremia due to Escherichia coli       Social History   Substance Use Topics   • Smoking status: Never Smoker   • Smokeless tobacco: Never Used   • Alcohol use No       Allergies   Allergen Reactions   • Contrast Dye Other (See Comments)     Can't have due to kidney failure per family       Current Outpatient Prescriptions on File Prior to Visit   Medication Sig   • apixaban (ELIQUIS) 5 MG tablet tablet Take 1  tablet by mouth Every 12 (Twelve) Hours.   • cholecalciferol (VITAMIN D3) 1000 units tablet Take 1,000 Units by mouth Daily.   • colchicine 0.6 MG tablet Take 1 tablet by mouth Daily.   • flecainide (TAMBOCOR) 50 MG tablet Take 1 tablet by mouth Every 12 (Twelve) Hours.   • FLUoxetine (PROzac) 20 MG capsule Take 40 mg by mouth Every Morning.   • FLUoxetine (PROzac) 20 MG capsule Take 20 mg by mouth Every Night.   • lansoprazole (PREVACID) 30 MG capsule Take 30 mg by mouth Daily.   • metoprolol tartrate (LOPRESSOR) 50 MG tablet Take 25 mg by mouth Daily.   • mometasone (ASMANEX) 220 MCG/INH inhaler Inhale 2 puffs Every Night.   • montelukast (SINGULAIR) 10 MG tablet Take 10 mg by mouth Daily.   • Morphine (MS CONTIN) 30 MG 12 hr tablet Take 1 tablet by mouth Every 12 (Twelve) Hours.   • nitroglycerin (NITROSTAT) 0.4 MG SL tablet Place 0.4 mg under the tongue as needed for chest pain.   • ondansetron ODT (ZOFRAN-ODT) 4 MG disintegrating tablet Dissolve 1 tablet on tongue Every 6 (Six) Hours As Needed for Nausea or Vomiting.   • oxyCODONE (ROXICODONE) 5 MG immediate release tablet Take 1-3 tabs every 4 hours as needed for pain   • pioglitazone (ACTOS) 30 MG tablet Take 30 mg by mouth Daily.   • polyethylene glycol (MIRALAX) packet Take 17 g by mouth Daily As Needed.   • sore muscle (ICY HOT EXTRA STRENGTH) 10-30 % cream cream Apply 1 application topically to the appropriate area as directed 3 (Three) Times a Day As Needed (local pain).   • Tiotropium Bromide-Olodaterol 2.5-2.5 MCG/ACT aerosol solution Inhale 2 puffs Daily.   • albuterol (PROVENTIL HFA;VENTOLIN HFA) 108 (90 BASE) MCG/ACT inhaler Inhale 2 puffs Every 4 (Four) Hours As Needed for Wheezing or Shortness of Air.   • [DISCONTINUED] dronabinol (MARINOL) 2.5 MG capsule Take 1 capsule by mouth 2 (Two) Times a Day Before Meals.     No current facility-administered medications on file prior to visit.          The following portions of the patient's history were  "reviewed and updated as appropriate: allergies, current medications, past family history, past medical history, past social history, past surgical history and problem list.    Review of Systems   Constitution: Positive for decreased appetite, weakness and malaise/fatigue.   HENT: Negative.  Negative for congestion.    Eyes: Negative.    Cardiovascular: Negative.  Negative for chest pain, cyanosis, dyspnea on exertion, irregular heartbeat, leg swelling, near-syncope, orthopnea, palpitations, paroxysmal nocturnal dyspnea and syncope.   Respiratory: Negative.  Negative for shortness of breath.    Hematologic/Lymphatic: Negative.    Musculoskeletal: Negative.    Gastrointestinal: Positive for nausea.   Genitourinary: Negative.    Neurological: Negative for headaches.   Psychiatric/Behavioral: The patient is nervous/anxious.           Objective:     /76 (BP Location: Left arm, Patient Position: Sitting, Cuff Size: Adult)   Pulse 90   Ht 180.3 cm (71\")   Wt 98 kg (216 lb)   SpO2 95%   BMI 30.13 kg/m²   Physical Exam   Constitutional: He appears well-developed and well-nourished. No distress.   HENT:   Head: Normocephalic and atraumatic.   Mouth/Throat: Oropharynx is clear and moist. No oropharyngeal exudate.   Eyes: Pupils are equal, round, and reactive to light. Conjunctivae and EOM are normal. No scleral icterus.   Neck: Normal range of motion. Neck supple. No JVD present. No tracheal deviation present. No thyromegaly present.   Cardiovascular: Normal rate, regular rhythm, normal heart sounds and intact distal pulses.  PMI is not displaced.  Exam reveals no gallop, no friction rub and no decreased pulses.    No murmur heard.  Pulses:       Carotid pulses are 3+ on the right side, and 3+ on the left side.       Radial pulses are 3+ on the right side, and 3+ on the left side.   Pulmonary/Chest: Effort normal and breath sounds normal. No respiratory distress. He has no wheezes. He has no rales. He exhibits no " tenderness.   Abdominal: Soft. Bowel sounds are normal. He exhibits no distension, no abdominal bruit and no mass. There is no splenomegaly or hepatomegaly. There is no tenderness. There is no rebound and no guarding.   Musculoskeletal: Normal range of motion. He exhibits no edema, tenderness or deformity.   Lymphadenopathy:     He has no cervical adenopathy.   Neurological: He is alert. He has normal reflexes. No cranial nerve deficit. He exhibits normal muscle tone. Coordination normal.   Skin: Skin is warm and dry. No rash noted. He is not diaphoretic. No erythema.   Psychiatric: He has a normal mood and affect. His behavior is normal. Judgment and thought content normal.         Lab Review  Lab Results   Component Value Date     09/25/2018    K 4.1 09/25/2018     09/25/2018    BUN 24 (H) 09/25/2018    CREATININE 1.64 (H) 09/25/2018    GLUCOSE 126 (H) 09/25/2018    CALCIUM 9.7 09/25/2018     (H) 09/25/2018    ALKPHOS 474 (H) 09/25/2018    LABIL2 1.5 03/25/2016     Lab Results   Component Value Date    CKTOTAL 107 08/24/2018     Lab Results   Component Value Date    WBC 5.68 09/25/2018    HGB 13.7 (L) 09/25/2018    HCT 40.4 (L) 09/25/2018     09/25/2018     Lab Results   Component Value Date    INR 1.08 08/21/2018    INR 1.00 08/13/2018    INR 0.92 10/22/2017     Lab Results   Component Value Date    MG 1.9 09/11/2018     Lab Results   Component Value Date    PSA 1.450 07/11/2017    TSH 1.811 09/07/2018     Lab Results   Component Value Date    .0 (H) 09/12/2018     Lab Results   Component Value Date    CHLPL 144 03/25/2016    CHOL 128 04/27/2018    TRIG 140 04/27/2018    HDL 41 (L) 04/27/2018    VLDL 28 04/27/2018    LDLHDL 1.44 04/27/2018     Lab Results   Component Value Date    LDL 59 04/27/2018    LDL 88 10/23/2017       Procedures       I personally viewed and interpreted the patient's LAB data         Assessment:     1. Paroxysmal atrial fibrillation (CMS/HCC)    2.  Essential hypertension    3. Mixed hyperlipidemia    4. CKD stage 3 secondary to diabetes (CMS/HCC)    5. Chronic anticoagulation, eliquis    6. Anxiety and depression          Plan:      Patient has paroxysmal atrial fibrillation currently is in sinus rhythm on Tambocor therapy.  Heart rate is controlled with Lopressor.  His last EKG on September 12, 2018 showed nonspecific anterior wall T-wave changes otherwise sinus rhythm rate of 82/m.    And pressure is very well controlled.  Patient has hyperlipidemia however her statin was discontinued because of abnormal liver functions.    He has pancreatic mass in his    Biopsy done next week.  Alkaline phosphatase and AST are  Elevated.  Patient will continue current medications.  Follow-up scheduled    No Follow-up on file.

## 2018-09-28 ENCOUNTER — ANESTHESIA (OUTPATIENT)
Dept: GASTROENTEROLOGY | Facility: HOSPITAL | Age: 68
End: 2018-09-28

## 2018-09-28 ENCOUNTER — HOSPITAL ENCOUNTER (OUTPATIENT)
Facility: HOSPITAL | Age: 68
Setting detail: HOSPITAL OUTPATIENT SURGERY
Discharge: HOME | End: 2018-09-28
Attending: INTERNAL MEDICINE | Admitting: ANESTHESIOLOGY

## 2018-09-28 ENCOUNTER — ANESTHESIA EVENT (OUTPATIENT)
Dept: GASTROENTEROLOGY | Facility: HOSPITAL | Age: 68
End: 2018-09-28

## 2018-09-28 VITALS
BODY MASS INDEX: 30.13 KG/M2 | DIASTOLIC BLOOD PRESSURE: 83 MMHG | RESPIRATION RATE: 20 BRPM | WEIGHT: 216 LBS | HEART RATE: 89 BPM | OXYGEN SATURATION: 99 % | TEMPERATURE: 98 F | SYSTOLIC BLOOD PRESSURE: 127 MMHG

## 2018-09-28 DIAGNOSIS — C25.0 MALIGNANT NEOPLASM OF HEAD OF PANCREAS (HCC): ICD-10-CM

## 2018-09-28 LAB
GLUCOSE BLDC GLUCOMTR-MCNC: 108 MG/DL (ref 70–130)
POTASSIUM BLDA-SCNC: 4.34 MMOL/L (ref 3.5–5.3)

## 2018-09-28 PROCEDURE — 88177 CYTP FNA EVAL EA ADDL: CPT | Performed by: INTERNAL MEDICINE

## 2018-09-28 PROCEDURE — 88173 CYTOPATH EVAL FNA REPORT: CPT | Performed by: INTERNAL MEDICINE

## 2018-09-28 PROCEDURE — 88172 CYTP DX EVAL FNA 1ST EA SITE: CPT | Performed by: INTERNAL MEDICINE

## 2018-09-28 PROCEDURE — 25010000002 PROPOFOL 1000 MG/ML EMULSION: Performed by: NURSE ANESTHETIST, CERTIFIED REGISTERED

## 2018-09-28 PROCEDURE — 82962 GLUCOSE BLOOD TEST: CPT

## 2018-09-28 PROCEDURE — 25010000002 PROPOFOL 10 MG/ML EMULSION: Performed by: NURSE ANESTHETIST, CERTIFIED REGISTERED

## 2018-09-28 PROCEDURE — 88305 TISSUE EXAM BY PATHOLOGIST: CPT | Performed by: INTERNAL MEDICINE

## 2018-09-28 PROCEDURE — 84132 ASSAY OF SERUM POTASSIUM: CPT | Performed by: ANESTHESIOLOGY

## 2018-09-28 RX ORDER — SODIUM CHLORIDE 9 MG/ML
9 INJECTION, SOLUTION INTRAVENOUS CONTINUOUS
Status: DISCONTINUED | OUTPATIENT
Start: 2018-09-28 | End: 2018-09-28 | Stop reason: HOSPADM

## 2018-09-28 RX ORDER — SODIUM CHLORIDE, SODIUM LACTATE, POTASSIUM CHLORIDE, CALCIUM CHLORIDE 600; 310; 30; 20 MG/100ML; MG/100ML; MG/100ML; MG/100ML
9 INJECTION, SOLUTION INTRAVENOUS CONTINUOUS
Status: DISCONTINUED | OUTPATIENT
Start: 2018-09-28 | End: 2018-09-28 | Stop reason: HOSPADM

## 2018-09-28 RX ORDER — FAMOTIDINE 10 MG/ML
20 INJECTION, SOLUTION INTRAVENOUS ONCE
Status: COMPLETED | OUTPATIENT
Start: 2018-09-28 | End: 2018-09-28

## 2018-09-28 RX ORDER — PROMETHAZINE HYDROCHLORIDE 25 MG/1
25 TABLET ORAL ONCE AS NEEDED
Status: DISCONTINUED | OUTPATIENT
Start: 2018-09-28 | End: 2018-09-28 | Stop reason: HOSPADM

## 2018-09-28 RX ORDER — PROMETHAZINE HYDROCHLORIDE 25 MG/1
25 SUPPOSITORY RECTAL ONCE AS NEEDED
Status: DISCONTINUED | OUTPATIENT
Start: 2018-09-28 | End: 2018-09-28 | Stop reason: HOSPADM

## 2018-09-28 RX ORDER — PROPOFOL 10 MG/ML
VIAL (ML) INTRAVENOUS AS NEEDED
Status: DISCONTINUED | OUTPATIENT
Start: 2018-09-28 | End: 2018-09-28 | Stop reason: SURG

## 2018-09-28 RX ORDER — ACETAMINOPHEN 325 MG/1
650 TABLET ORAL ONCE AS NEEDED
Status: DISCONTINUED | OUTPATIENT
Start: 2018-09-28 | End: 2018-09-28 | Stop reason: HOSPADM

## 2018-09-28 RX ORDER — GLYCOPYRROLATE 0.2 MG/ML
INJECTION INTRAMUSCULAR; INTRAVENOUS AS NEEDED
Status: DISCONTINUED | OUTPATIENT
Start: 2018-09-28 | End: 2018-09-28 | Stop reason: SURG

## 2018-09-28 RX ORDER — LIDOCAINE HYDROCHLORIDE 10 MG/ML
0.5 INJECTION, SOLUTION EPIDURAL; INFILTRATION; INTRACAUDAL; PERINEURAL ONCE AS NEEDED
Status: DISCONTINUED | OUTPATIENT
Start: 2018-09-28 | End: 2018-09-28 | Stop reason: HOSPADM

## 2018-09-28 RX ORDER — IPRATROPIUM BROMIDE AND ALBUTEROL SULFATE 2.5; .5 MG/3ML; MG/3ML
3 SOLUTION RESPIRATORY (INHALATION) ONCE AS NEEDED
Status: DISCONTINUED | OUTPATIENT
Start: 2018-09-28 | End: 2018-09-28 | Stop reason: HOSPADM

## 2018-09-28 RX ORDER — PROMETHAZINE HYDROCHLORIDE 25 MG/ML
6.25 INJECTION, SOLUTION INTRAMUSCULAR; INTRAVENOUS ONCE AS NEEDED
Status: DISCONTINUED | OUTPATIENT
Start: 2018-09-28 | End: 2018-09-28 | Stop reason: HOSPADM

## 2018-09-28 RX ORDER — LIDOCAINE HYDROCHLORIDE 10 MG/ML
INJECTION, SOLUTION EPIDURAL; INFILTRATION; INTRACAUDAL; PERINEURAL AS NEEDED
Status: DISCONTINUED | OUTPATIENT
Start: 2018-09-28 | End: 2018-09-28 | Stop reason: SURG

## 2018-09-28 RX ORDER — ONDANSETRON 2 MG/ML
4 INJECTION INTRAMUSCULAR; INTRAVENOUS ONCE AS NEEDED
Status: DISCONTINUED | OUTPATIENT
Start: 2018-09-28 | End: 2018-09-28 | Stop reason: HOSPADM

## 2018-09-28 RX ORDER — FAMOTIDINE 20 MG/1
20 TABLET, FILM COATED ORAL ONCE
Status: DISCONTINUED | OUTPATIENT
Start: 2018-09-28 | End: 2018-09-28 | Stop reason: HOSPADM

## 2018-09-28 RX ORDER — SODIUM CHLORIDE 0.9 % (FLUSH) 0.9 %
1-10 SYRINGE (ML) INJECTION AS NEEDED
Status: DISCONTINUED | OUTPATIENT
Start: 2018-09-28 | End: 2018-09-28 | Stop reason: HOSPADM

## 2018-09-28 RX ADMIN — SODIUM CHLORIDE, POTASSIUM CHLORIDE, SODIUM LACTATE AND CALCIUM CHLORIDE: 600; 310; 30; 20 INJECTION, SOLUTION INTRAVENOUS at 14:35

## 2018-09-28 RX ADMIN — PROPOFOL 160 MCG/KG/MIN: 10 INJECTION, EMULSION INTRAVENOUS at 14:39

## 2018-09-28 RX ADMIN — LIDOCAINE HYDROCHLORIDE 50 MG: 10 INJECTION, SOLUTION EPIDURAL; INFILTRATION; INTRACAUDAL; PERINEURAL at 14:39

## 2018-09-28 RX ADMIN — GLYCOPYRROLATE 0.2 MG: 0.2 INJECTION, SOLUTION INTRAMUSCULAR; INTRAVENOUS at 14:40

## 2018-09-28 RX ADMIN — PROPOFOL 50 MG: 10 INJECTION, EMULSION INTRAVENOUS at 14:59

## 2018-09-28 RX ADMIN — FAMOTIDINE 20 MG: 10 INJECTION, SOLUTION INTRAVENOUS at 13:12

## 2018-09-28 RX ADMIN — SODIUM CHLORIDE 9 ML/HR: 9 INJECTION, SOLUTION INTRAVENOUS at 13:12

## 2018-09-28 RX ADMIN — PROPOFOL 100 MG: 10 INJECTION, EMULSION INTRAVENOUS at 14:39

## 2018-09-28 NOTE — ANESTHESIA PREPROCEDURE EVALUATION
Anesthesia Evaluation     NPO Solid Status: > 8 hours  NPO Liquid Status: > 8 hours           Airway   Dental      Pulmonary    (-) asthma, not a smoker  Cardiovascular     (+) hypertension, CAD, cardiac stents (says he has one stent. )   (-) pacemaker      Neuro/Psych  (-) seizures, TIA, CVA  GI/Hepatic/Renal/Endo      Musculoskeletal     Abdominal    Substance History      OB/GYN          Other        ROS/Med Hx Other: August 10,2018 lap ashlyn, then 3 days later stented biliary duct, then pneumonia.    Now much improved . Room air sats 99%                  Anesthesia Plan    ASA 3     general

## 2018-09-28 NOTE — ANESTHESIA POSTPROCEDURE EVALUATION
Patient: Todd Phillips    Procedure Summary     Date:  09/28/18 Room / Location:   [facility]    Anesthesia Start:  1435 Anesthesia Stop:  1551    Procedure:  ENDOSCOPIC ULTRASOUND (UPPER) (N/A ) Diagnosis:       Malignant neoplasm of head of pancreas (CMS/HCC)      (Malignant neoplasm of head of pancreas (CMS/HCC) [C25.0])    Surgeon:  Bhargav Browning MD Provider:  Kristopher Mercado MD    Anesthesia Type:  general ASA Status:  3          Anesthesia Type: general  Last vitals  BP   148/99 (09/28/18 1302)   Temp   97.2 °F (36.2 °C) (09/28/18 1302)   Pulse   87 (09/28/18 1302)   Resp   20 (09/28/18 1302)     SpO2   97 % (09/28/18 1302)     Post Anesthesia Care and Evaluation    Patient location during evaluation: PACU  Patient participation: complete - patient participated  Level of consciousness: awake and alert  Pain score: 0  Pain management: adequate  Airway patency: patent  Anesthetic complications: No anesthetic complications  PONV Status: none  Cardiovascular status: hemodynamically stable and acceptable  Respiratory status: nonlabored ventilation, acceptable and nasal cannula  Hydration status: acceptable

## 2018-09-28 NOTE — ANESTHESIA POSTPROCEDURE EVALUATION
Patient: Todd Phillips    Procedure Summary     Date:  09/28/18 Room / Location:   [facility]    Anesthesia Start:  1435 Anesthesia Stop:  1551    Procedure:  ENDOSCOPIC ULTRASOUND (UPPER) (N/A ) Diagnosis:       Malignant neoplasm of head of pancreas (CMS/HCC)      (Malignant neoplasm of head of pancreas (CMS/HCC) [C25.0])    Surgeon:  Bhargav Browning MD Provider:  Kristopher Mercado MD    Anesthesia Type:  Not recorded ASA Status:  Not recorded          Anesthesia Type: No value filed.  Last vitals  BP   148/99 (09/28/18 1302)   Temp   97.2 °F (36.2 °C) (09/28/18 1302)   Pulse   87 (09/28/18 1302)   Resp   20 (09/28/18 1302)     SpO2   97 % (09/28/18 1302)     Post Anesthesia Care and Evaluation    Patient location during evaluation: PACU  Patient participation: complete - patient participated  Level of consciousness: awake and alert  Pain score: 0  Pain management: adequate  Airway patency: patent  Anesthetic complications: No anesthetic complications  PONV Status: none  Cardiovascular status: hemodynamically stable and acceptable  Respiratory status: nonlabored ventilation, acceptable and nasal cannula  Hydration status: acceptable

## 2018-09-30 ENCOUNTER — APPOINTMENT (OUTPATIENT)
Dept: GENERAL RADIOLOGY | Facility: HOSPITAL | Age: 68
End: 2018-09-30

## 2018-09-30 ENCOUNTER — HOSPITAL ENCOUNTER (EMERGENCY)
Facility: HOSPITAL | Age: 68
Discharge: ANOTHER HOSPITAL | End: 2018-09-30
Attending: INTERNAL MEDICINE | Admitting: INTERNAL MEDICINE

## 2018-09-30 ENCOUNTER — APPOINTMENT (OUTPATIENT)
Dept: CT IMAGING | Facility: HOSPITAL | Age: 68
End: 2018-09-30

## 2018-09-30 DIAGNOSIS — A41.9 SEPSIS, DUE TO UNSPECIFIED ORGANISM: Primary | ICD-10-CM

## 2018-09-30 DIAGNOSIS — R41.0 ACUTE DELIRIUM: ICD-10-CM

## 2018-09-30 DIAGNOSIS — K83.1 BILIARY OBSTRUCTION: ICD-10-CM

## 2018-09-30 DIAGNOSIS — C25.9 MALIGNANT NEOPLASM OF PANCREAS, UNSPECIFIED LOCATION OF MALIGNANCY (HCC): ICD-10-CM

## 2018-09-30 LAB
ALBUMIN SERPL-MCNC: 4 G/DL (ref 3.4–4.8)
ALBUMIN/GLOB SERPL: 1.3 G/DL (ref 1.5–2.5)
ALP SERPL-CCNC: 475 U/L (ref 40–129)
ALT SERPL W P-5'-P-CCNC: 225 U/L (ref 10–44)
AMMONIA BLD-SCNC: 43 UMOL/L (ref 16–60)
AMYLASE SERPL-CCNC: 52 U/L (ref 28–100)
ANION GAP SERPL CALCULATED.3IONS-SCNC: 11.2 MMOL/L (ref 3.6–11.2)
APTT PPP: 24.2 SECONDS (ref 23.8–36.1)
AST SERPL-CCNC: 235 U/L (ref 10–34)
BACTERIA UR QL AUTO: ABNORMAL /HPF
BASOPHILS # BLD AUTO: 0.01 10*3/MM3 (ref 0–0.3)
BASOPHILS NFR BLD AUTO: 0.2 % (ref 0–2)
BILIRUB SERPL-MCNC: 3.2 MG/DL (ref 0.2–1.8)
BILIRUB UR QL STRIP: ABNORMAL
BUN BLD-MCNC: 21 MG/DL (ref 7–21)
BUN/CREAT SERPL: 16 (ref 7–25)
CALCIUM SPEC-SCNC: 9.5 MG/DL (ref 7.7–10)
CHLORIDE SERPL-SCNC: 104 MMOL/L (ref 99–112)
CLARITY UR: CLEAR
CO2 SERPL-SCNC: 21.8 MMOL/L (ref 24.3–31.9)
COLOR UR: ABNORMAL
CREAT BLD-MCNC: 1.31 MG/DL (ref 0.43–1.29)
D-LACTATE SERPL-SCNC: 1.4 MMOL/L (ref 0.5–2)
DEPRECATED RDW RBC AUTO: 44 FL (ref 37–54)
EOSINOPHIL # BLD AUTO: 0.06 10*3/MM3 (ref 0–0.7)
EOSINOPHIL NFR BLD AUTO: 1.1 % (ref 0–7)
ERYTHROCYTE [DISTWIDTH] IN BLOOD BY AUTOMATED COUNT: 13.9 % (ref 11.5–14.5)
GFR SERPL CREATININE-BSD FRML MDRD: 54 ML/MIN/1.73
GLOBULIN UR ELPH-MCNC: 3.1 GM/DL
GLUCOSE BLD-MCNC: 192 MG/DL (ref 70–110)
GLUCOSE UR STRIP-MCNC: NEGATIVE MG/DL
HCT VFR BLD AUTO: 37.3 % (ref 42–52)
HGB BLD-MCNC: 12.5 G/DL (ref 14–18)
HGB UR QL STRIP.AUTO: NEGATIVE
HYALINE CASTS UR QL AUTO: ABNORMAL /LPF
IMM GRANULOCYTES # BLD: 0.02 10*3/MM3 (ref 0–0.03)
IMM GRANULOCYTES NFR BLD: 0.4 % (ref 0–0.5)
INR PPP: 0.99 (ref 0.9–1.1)
KETONES UR QL STRIP: NEGATIVE
LEUKOCYTE ESTERASE UR QL STRIP.AUTO: ABNORMAL
LIPASE SERPL-CCNC: 24 U/L (ref 13–60)
LYMPHOCYTES # BLD AUTO: 0.94 10*3/MM3 (ref 1–3)
LYMPHOCYTES NFR BLD AUTO: 16.9 % (ref 16–46)
MAGNESIUM SERPL-MCNC: 1.5 MG/DL (ref 1.7–2.6)
MCH RBC QN AUTO: 29.7 PG (ref 27–33)
MCHC RBC AUTO-ENTMCNC: 33.5 G/DL (ref 33–37)
MCV RBC AUTO: 88.6 FL (ref 80–94)
MONOCYTES # BLD AUTO: 0.34 10*3/MM3 (ref 0.1–0.9)
MONOCYTES NFR BLD AUTO: 6.1 % (ref 0–12)
NEUTROPHILS # BLD AUTO: 4.2 10*3/MM3 (ref 1.4–6.5)
NEUTROPHILS NFR BLD AUTO: 75.3 % (ref 40–75)
NITRITE UR QL STRIP: NEGATIVE
OSMOLALITY SERPL CALC.SUM OF ELEC: 282 MOSM/KG (ref 273–305)
PH UR STRIP.AUTO: 5.5 [PH] (ref 5–8)
PLATELET # BLD AUTO: 224 10*3/MM3 (ref 130–400)
PMV BLD AUTO: 10.2 FL (ref 6–10)
POTASSIUM BLD-SCNC: 4.1 MMOL/L (ref 3.5–5.3)
PROT SERPL-MCNC: 7.1 G/DL (ref 6–8)
PROT UR QL STRIP: NEGATIVE
PROTHROMBIN TIME: 13.3 SECONDS (ref 11–15.4)
RBC # BLD AUTO: 4.21 10*6/MM3 (ref 4.7–6.1)
RBC # UR: ABNORMAL /HPF
REF LAB TEST METHOD: ABNORMAL
SODIUM BLD-SCNC: 137 MMOL/L (ref 135–153)
SP GR UR STRIP: 1.02 (ref 1–1.03)
SQUAMOUS #/AREA URNS HPF: ABNORMAL /HPF
TROPONIN I SERPL-MCNC: 0.01 NG/ML
TROPONIN I SERPL-MCNC: <0.006 NG/ML
TSH SERPL DL<=0.05 MIU/L-ACNC: 0.62 MIU/ML (ref 0.55–4.78)
UROBILINOGEN UR QL STRIP: ABNORMAL
WBC NRBC COR # BLD: 5.57 10*3/MM3 (ref 4.5–12.5)
WBC UR QL AUTO: ABNORMAL /HPF

## 2018-09-30 PROCEDURE — 84443 ASSAY THYROID STIM HORMONE: CPT | Performed by: INTERNAL MEDICINE

## 2018-09-30 PROCEDURE — 87186 SC STD MICRODIL/AGAR DIL: CPT | Performed by: INTERNAL MEDICINE

## 2018-09-30 PROCEDURE — 25010000002 MORPHINE PER 10 MG: Performed by: INTERNAL MEDICINE

## 2018-09-30 PROCEDURE — 83690 ASSAY OF LIPASE: CPT | Performed by: INTERNAL MEDICINE

## 2018-09-30 PROCEDURE — 85610 PROTHROMBIN TIME: CPT | Performed by: INTERNAL MEDICINE

## 2018-09-30 PROCEDURE — 96375 TX/PRO/DX INJ NEW DRUG ADDON: CPT

## 2018-09-30 PROCEDURE — 84484 ASSAY OF TROPONIN QUANT: CPT | Performed by: INTERNAL MEDICINE

## 2018-09-30 PROCEDURE — 74176 CT ABD & PELVIS W/O CONTRAST: CPT | Performed by: RADIOLOGY

## 2018-09-30 PROCEDURE — 81001 URINALYSIS AUTO W/SCOPE: CPT | Performed by: INTERNAL MEDICINE

## 2018-09-30 PROCEDURE — 83735 ASSAY OF MAGNESIUM: CPT | Performed by: INTERNAL MEDICINE

## 2018-09-30 PROCEDURE — 82140 ASSAY OF AMMONIA: CPT | Performed by: INTERNAL MEDICINE

## 2018-09-30 PROCEDURE — 93005 ELECTROCARDIOGRAM TRACING: CPT | Performed by: INTERNAL MEDICINE

## 2018-09-30 PROCEDURE — 96365 THER/PROPH/DIAG IV INF INIT: CPT

## 2018-09-30 PROCEDURE — 74176 CT ABD & PELVIS W/O CONTRAST: CPT

## 2018-09-30 PROCEDURE — 25010000002 MORPHINE SULFATE (PF) 2 MG/ML SOLUTION: Performed by: INTERNAL MEDICINE

## 2018-09-30 PROCEDURE — 83605 ASSAY OF LACTIC ACID: CPT | Performed by: INTERNAL MEDICINE

## 2018-09-30 PROCEDURE — 85025 COMPLETE CBC W/AUTO DIFF WBC: CPT | Performed by: INTERNAL MEDICINE

## 2018-09-30 PROCEDURE — 85730 THROMBOPLASTIN TIME PARTIAL: CPT | Performed by: INTERNAL MEDICINE

## 2018-09-30 PROCEDURE — 87040 BLOOD CULTURE FOR BACTERIA: CPT | Performed by: INTERNAL MEDICINE

## 2018-09-30 PROCEDURE — 25010000002 VANCOMYCIN PER 500 MG: Performed by: INTERNAL MEDICINE

## 2018-09-30 PROCEDURE — 82150 ASSAY OF AMYLASE: CPT | Performed by: INTERNAL MEDICINE

## 2018-09-30 PROCEDURE — 71045 X-RAY EXAM CHEST 1 VIEW: CPT

## 2018-09-30 PROCEDURE — 71045 X-RAY EXAM CHEST 1 VIEW: CPT | Performed by: RADIOLOGY

## 2018-09-30 PROCEDURE — 80053 COMPREHEN METABOLIC PANEL: CPT | Performed by: INTERNAL MEDICINE

## 2018-09-30 PROCEDURE — 99285 EMERGENCY DEPT VISIT HI MDM: CPT

## 2018-09-30 PROCEDURE — 87077 CULTURE AEROBIC IDENTIFY: CPT | Performed by: INTERNAL MEDICINE

## 2018-09-30 PROCEDURE — 25010000002 PIPERACILLIN-TAZOBACTAM: Performed by: INTERNAL MEDICINE

## 2018-09-30 PROCEDURE — 25010000002 ONDANSETRON PER 1 MG: Performed by: INTERNAL MEDICINE

## 2018-09-30 PROCEDURE — 87150 DNA/RNA AMPLIFIED PROBE: CPT | Performed by: INTERNAL MEDICINE

## 2018-09-30 PROCEDURE — 96367 TX/PROPH/DG ADDL SEQ IV INF: CPT

## 2018-09-30 RX ORDER — SODIUM CHLORIDE 0.9 % (FLUSH) 0.9 %
10 SYRINGE (ML) INJECTION AS NEEDED
Status: DISCONTINUED | OUTPATIENT
Start: 2018-09-30 | End: 2018-10-01 | Stop reason: HOSPADM

## 2018-09-30 RX ORDER — ACETAMINOPHEN 500 MG
1000 TABLET ORAL ONCE
Status: DISCONTINUED | OUTPATIENT
Start: 2018-09-30 | End: 2018-09-30

## 2018-09-30 RX ORDER — MORPHINE SULFATE 2 MG/ML
2 INJECTION, SOLUTION INTRAMUSCULAR; INTRAVENOUS ONCE
Status: COMPLETED | OUTPATIENT
Start: 2018-09-30 | End: 2018-09-30

## 2018-09-30 RX ORDER — ACETAMINOPHEN 650 MG/1
650 SUPPOSITORY RECTAL ONCE
Status: COMPLETED | OUTPATIENT
Start: 2018-09-30 | End: 2018-09-30

## 2018-09-30 RX ORDER — MORPHINE SULFATE 2 MG/ML
4 INJECTION, SOLUTION INTRAMUSCULAR; INTRAVENOUS ONCE
Status: COMPLETED | OUTPATIENT
Start: 2018-09-30 | End: 2018-09-30

## 2018-09-30 RX ORDER — ONDANSETRON 2 MG/ML
8 INJECTION INTRAMUSCULAR; INTRAVENOUS ONCE
Status: COMPLETED | OUTPATIENT
Start: 2018-09-30 | End: 2018-09-30

## 2018-09-30 RX ADMIN — MORPHINE SULFATE 4 MG: 2 INJECTION, SOLUTION INTRAMUSCULAR; INTRAVENOUS at 04:00

## 2018-09-30 RX ADMIN — SODIUM CHLORIDE 2994 ML: 9 INJECTION, SOLUTION INTRAVENOUS at 17:47

## 2018-09-30 RX ADMIN — MORPHINE SULFATE 2 MG: 2 INJECTION, SOLUTION INTRAMUSCULAR; INTRAVENOUS at 22:03

## 2018-09-30 RX ADMIN — VANCOMYCIN HYDROCHLORIDE 2000 MG: 5 INJECTION, POWDER, LYOPHILIZED, FOR SOLUTION INTRAVENOUS at 18:44

## 2018-09-30 RX ADMIN — PIPERACILLIN SODIUM,TAZOBACTAM SODIUM 3.38 G: 3; .375 INJECTION, POWDER, FOR SOLUTION INTRAVENOUS at 17:47

## 2018-09-30 RX ADMIN — ACETAMINOPHEN 650 MG: 650 SUPPOSITORY RECTAL at 18:27

## 2018-09-30 RX ADMIN — ONDANSETRON 8 MG: 2 INJECTION, SOLUTION INTRAMUSCULAR; INTRAVENOUS at 17:47

## 2018-10-01 ENCOUNTER — APPOINTMENT (OUTPATIENT)
Dept: GENERAL RADIOLOGY | Facility: HOSPITAL | Age: 68
End: 2018-10-01

## 2018-10-01 ENCOUNTER — ANESTHESIA EVENT (OUTPATIENT)
Dept: GASTROENTEROLOGY | Facility: HOSPITAL | Age: 68
End: 2018-10-01

## 2018-10-01 ENCOUNTER — ANESTHESIA (OUTPATIENT)
Dept: GASTROENTEROLOGY | Facility: HOSPITAL | Age: 68
End: 2018-10-01

## 2018-10-01 ENCOUNTER — HOSPITAL ENCOUNTER (INPATIENT)
Facility: HOSPITAL | Age: 68
LOS: 3 days | Discharge: HOME | End: 2018-10-04
Attending: FAMILY MEDICINE | Admitting: HOSPITALIST

## 2018-10-01 ENCOUNTER — READMISSION MANAGEMENT (OUTPATIENT)
Dept: CALL CENTER | Facility: HOSPITAL | Age: 68
End: 2018-10-01

## 2018-10-01 VITALS
HEART RATE: 102 BPM | SYSTOLIC BLOOD PRESSURE: 138 MMHG | TEMPERATURE: 99.6 F | HEIGHT: 72 IN | RESPIRATION RATE: 18 BRPM | WEIGHT: 220 LBS | OXYGEN SATURATION: 97 % | DIASTOLIC BLOOD PRESSURE: 84 MMHG | BODY MASS INDEX: 29.8 KG/M2

## 2018-10-01 DIAGNOSIS — Z78.9 IMPAIRED MOBILITY AND ADLS: ICD-10-CM

## 2018-10-01 DIAGNOSIS — K83.1 BILIARY OBSTRUCTION: ICD-10-CM

## 2018-10-01 DIAGNOSIS — Z74.09 IMPAIRED MOBILITY AND ADLS: ICD-10-CM

## 2018-10-01 DIAGNOSIS — R50.9 FEVER, UNSPECIFIED FEVER CAUSE: ICD-10-CM

## 2018-10-01 DIAGNOSIS — A41.9 SEPSIS, DUE TO UNSPECIFIED ORGANISM: Primary | ICD-10-CM

## 2018-10-01 PROBLEM — R11.2 NAUSEA VOMITING AND DIARRHEA: Status: ACTIVE | Noted: 2018-10-01

## 2018-10-01 PROBLEM — R10.9 ABDOMINAL PAIN: Status: ACTIVE | Noted: 2018-10-01

## 2018-10-01 PROBLEM — R19.7 NAUSEA VOMITING AND DIARRHEA: Status: ACTIVE | Noted: 2018-10-01

## 2018-10-01 LAB
ALBUMIN SERPL-MCNC: 3.3 G/DL (ref 3.2–4.8)
ALBUMIN/GLOB SERPL: 1.7 G/DL (ref 1.5–2.5)
ALP SERPL-CCNC: 352 U/L (ref 25–100)
ALT SERPL W P-5'-P-CCNC: 202 U/L (ref 7–40)
ANION GAP SERPL CALCULATED.3IONS-SCNC: 6 MMOL/L (ref 3–11)
AST SERPL-CCNC: 190 U/L (ref 0–33)
BACTERIA BLD CULT: ABNORMAL
BASOPHILS # BLD AUTO: 0.01 10*3/MM3 (ref 0–0.2)
BASOPHILS NFR BLD AUTO: 0.1 % (ref 0–1)
BILIRUB SERPL-MCNC: 4.4 MG/DL (ref 0.3–1.2)
BUN BLD-MCNC: 17 MG/DL (ref 9–23)
BUN/CREAT SERPL: 13.9 (ref 7–25)
CALCIUM SPEC-SCNC: 8.3 MG/DL (ref 8.7–10.4)
CHLORIDE SERPL-SCNC: 105 MMOL/L (ref 99–109)
CO2 SERPL-SCNC: 24 MMOL/L (ref 20–31)
CREAT BLD-MCNC: 1.22 MG/DL (ref 0.6–1.3)
D-LACTATE SERPL-SCNC: 1.1 MMOL/L (ref 0.5–2)
DEPRECATED RDW RBC AUTO: 46.4 FL (ref 37–54)
EOSINOPHIL # BLD AUTO: 0 10*3/MM3 (ref 0–0.3)
EOSINOPHIL NFR BLD AUTO: 0 % (ref 0–3)
ERYTHROCYTE [DISTWIDTH] IN BLOOD BY AUTOMATED COUNT: 14.3 % (ref 11.3–14.5)
GFR SERPL CREATININE-BSD FRML MDRD: 59 ML/MIN/1.73
GLOBULIN UR ELPH-MCNC: 2 GM/DL
GLUCOSE BLD-MCNC: 167 MG/DL (ref 70–100)
GLUCOSE BLDC GLUCOMTR-MCNC: 133 MG/DL (ref 70–130)
GLUCOSE BLDC GLUCOMTR-MCNC: 134 MG/DL (ref 70–130)
GLUCOSE BLDC GLUCOMTR-MCNC: 138 MG/DL (ref 70–130)
GLUCOSE BLDC GLUCOMTR-MCNC: 145 MG/DL (ref 70–130)
HCT VFR BLD AUTO: 33.6 % (ref 38.9–50.9)
HGB BLD-MCNC: 11.2 G/DL (ref 13.1–17.5)
IMM GRANULOCYTES # BLD: 0.02 10*3/MM3 (ref 0–0.03)
IMM GRANULOCYTES NFR BLD: 0.2 % (ref 0–0.6)
LYMPHOCYTES # BLD AUTO: 0.55 10*3/MM3 (ref 0.6–4.8)
LYMPHOCYTES NFR BLD AUTO: 5.4 % (ref 24–44)
MCH RBC QN AUTO: 29.6 PG (ref 27–31)
MCHC RBC AUTO-ENTMCNC: 33.3 G/DL (ref 32–36)
MCV RBC AUTO: 88.7 FL (ref 80–99)
MONOCYTES # BLD AUTO: 0.66 10*3/MM3 (ref 0–1)
MONOCYTES NFR BLD AUTO: 6.5 % (ref 0–12)
NEUTROPHILS # BLD AUTO: 8.95 10*3/MM3 (ref 1.5–8.3)
NEUTROPHILS NFR BLD AUTO: 88 % (ref 41–71)
PLATELET # BLD AUTO: 190 10*3/MM3 (ref 150–450)
PMV BLD AUTO: 9.9 FL (ref 6–12)
POTASSIUM BLD-SCNC: 3.9 MMOL/L (ref 3.5–5.5)
PROCALCITONIN SERPL-MCNC: 17.63 NG/ML
PROT SERPL-MCNC: 5.3 G/DL (ref 5.7–8.2)
RBC # BLD AUTO: 3.79 10*6/MM3 (ref 4.2–5.76)
SODIUM BLD-SCNC: 135 MMOL/L (ref 132–146)
WBC NRBC COR # BLD: 10.17 10*3/MM3 (ref 3.5–10.8)

## 2018-10-01 PROCEDURE — 99223 1ST HOSP IP/OBS HIGH 75: CPT | Performed by: INTERNAL MEDICINE

## 2018-10-01 PROCEDURE — 83605 ASSAY OF LACTIC ACID: CPT | Performed by: INTERNAL MEDICINE

## 2018-10-01 PROCEDURE — 25010000002 HYDROMORPHONE PER 4 MG: Performed by: INTERNAL MEDICINE

## 2018-10-01 PROCEDURE — 94799 UNLISTED PULMONARY SVC/PX: CPT

## 2018-10-01 PROCEDURE — 74330 X-RAY BILE/PANC ENDOSCOPY: CPT

## 2018-10-01 PROCEDURE — BF101ZZ FLUOROSCOPY OF BILE DUCTS USING LOW OSMOLAR CONTRAST: ICD-10-PCS | Performed by: INTERNAL MEDICINE

## 2018-10-01 PROCEDURE — C1874 STENT, COATED/COV W/DEL SYS: HCPCS | Performed by: INTERNAL MEDICINE

## 2018-10-01 PROCEDURE — 25010000002 MEROPENEM: Performed by: INTERNAL MEDICINE

## 2018-10-01 PROCEDURE — 25010000002 NEOSTIGMINE PER 0.5 MG: Performed by: ANESTHESIOLOGY

## 2018-10-01 PROCEDURE — 0F798DZ DILATION OF COMMON BILE DUCT WITH INTRALUMINAL DEVICE, VIA NATURAL OR ARTIFICIAL OPENING ENDOSCOPIC: ICD-10-PCS | Performed by: INTERNAL MEDICINE

## 2018-10-01 PROCEDURE — 87040 BLOOD CULTURE FOR BACTERIA: CPT | Performed by: INTERNAL MEDICINE

## 2018-10-01 PROCEDURE — 25010000002 PROPOFOL 10 MG/ML EMULSION: Performed by: ANESTHESIOLOGY

## 2018-10-01 PROCEDURE — 99222 1ST HOSP IP/OBS MODERATE 55: CPT | Performed by: NURSE PRACTITIONER

## 2018-10-01 PROCEDURE — 85025 COMPLETE CBC W/AUTO DIFF WBC: CPT | Performed by: INTERNAL MEDICINE

## 2018-10-01 PROCEDURE — 94640 AIRWAY INHALATION TREATMENT: CPT

## 2018-10-01 PROCEDURE — 84145 PROCALCITONIN (PCT): CPT | Performed by: INTERNAL MEDICINE

## 2018-10-01 PROCEDURE — 80053 COMPREHEN METABOLIC PANEL: CPT | Performed by: INTERNAL MEDICINE

## 2018-10-01 PROCEDURE — 0FPB8DZ REMOVAL OF INTRALUMINAL DEVICE FROM HEPATOBILIARY DUCT, VIA NATURAL OR ARTIFICIAL OPENING ENDOSCOPIC: ICD-10-PCS | Performed by: INTERNAL MEDICINE

## 2018-10-01 PROCEDURE — C1726 CATH, BAL DIL, NON-VASCULAR: HCPCS | Performed by: INTERNAL MEDICINE

## 2018-10-01 PROCEDURE — 63710000001 INSULIN LISPRO (HUMAN) PER 5 UNITS: Performed by: INTERNAL MEDICINE

## 2018-10-01 PROCEDURE — 25010000002 VANCOMYCIN 10 G RECONSTITUTED SOLUTION

## 2018-10-01 PROCEDURE — 25010000002 HEPARIN (PORCINE) PER 1000 UNITS: Performed by: INTERNAL MEDICINE

## 2018-10-01 PROCEDURE — 25010000002 FENTANYL CITRATE (PF) 100 MCG/2ML SOLUTION: Performed by: ANESTHESIOLOGY

## 2018-10-01 PROCEDURE — 25010000002 ONDANSETRON PER 1 MG: Performed by: INTERNAL MEDICINE

## 2018-10-01 PROCEDURE — C1769 GUIDE WIRE: HCPCS | Performed by: INTERNAL MEDICINE

## 2018-10-01 PROCEDURE — 94760 N-INVAS EAR/PLS OXIMETRY 1: CPT

## 2018-10-01 PROCEDURE — 82962 GLUCOSE BLOOD TEST: CPT

## 2018-10-01 DEVICE — STENT SYSTEM RMV
Type: IMPLANTABLE DEVICE | Site: BILE DUCT | Status: FUNCTIONAL
Brand: WALLFLEX BILIARY

## 2018-10-01 RX ORDER — SODIUM CHLORIDE 0.9 % (FLUSH) 0.9 %
1-10 SYRINGE (ML) INJECTION AS NEEDED
Status: DISCONTINUED | OUTPATIENT
Start: 2018-10-01 | End: 2018-10-04 | Stop reason: HOSPADM

## 2018-10-01 RX ORDER — SODIUM CHLORIDE, SODIUM LACTATE, POTASSIUM CHLORIDE, CALCIUM CHLORIDE 600; 310; 30; 20 MG/100ML; MG/100ML; MG/100ML; MG/100ML
9 INJECTION, SOLUTION INTRAVENOUS CONTINUOUS
Status: DISCONTINUED | OUTPATIENT
Start: 2018-10-01 | End: 2018-10-04

## 2018-10-01 RX ORDER — SODIUM CHLORIDE 9 MG/ML
50 INJECTION, SOLUTION INTRAVENOUS ONCE
Status: DISCONTINUED | OUTPATIENT
Start: 2018-10-01 | End: 2018-10-01 | Stop reason: HOSPADM

## 2018-10-01 RX ORDER — HEPARIN SODIUM 5000 [USP'U]/ML
5000 INJECTION, SOLUTION INTRAVENOUS; SUBCUTANEOUS EVERY 8 HOURS SCHEDULED
Status: COMPLETED | OUTPATIENT
Start: 2018-10-01 | End: 2018-10-04

## 2018-10-01 RX ORDER — FLUOXETINE HYDROCHLORIDE 20 MG/1
40 CAPSULE ORAL DAILY
Status: DISCONTINUED | OUTPATIENT
Start: 2018-10-01 | End: 2018-10-04 | Stop reason: HOSPADM

## 2018-10-01 RX ORDER — DEXTROSE MONOHYDRATE 25 G/50ML
25 INJECTION, SOLUTION INTRAVENOUS
Status: DISCONTINUED | OUTPATIENT
Start: 2018-10-01 | End: 2018-10-04 | Stop reason: HOSPADM

## 2018-10-01 RX ORDER — SODIUM CHLORIDE 0.9 % (FLUSH) 0.9 %
1-10 SYRINGE (ML) INJECTION AS NEEDED
Status: DISCONTINUED | OUTPATIENT
Start: 2018-10-01 | End: 2018-10-01 | Stop reason: HOSPADM

## 2018-10-01 RX ORDER — SODIUM CHLORIDE 9 MG/ML
150 INJECTION, SOLUTION INTRAVENOUS CONTINUOUS
Status: DISCONTINUED | OUTPATIENT
Start: 2018-10-01 | End: 2018-10-04 | Stop reason: HOSPADM

## 2018-10-01 RX ORDER — BUDESONIDE 0.5 MG/2ML
0.25 INHALANT ORAL
Status: DISCONTINUED | OUTPATIENT
Start: 2018-10-01 | End: 2018-10-04 | Stop reason: HOSPADM

## 2018-10-01 RX ORDER — SODIUM CHLORIDE 9 MG/ML
100 INJECTION, SOLUTION INTRAVENOUS ONCE
Status: COMPLETED | OUTPATIENT
Start: 2018-10-01 | End: 2018-10-01

## 2018-10-01 RX ORDER — FLECAINIDE ACETATE 50 MG/1
50 TABLET ORAL EVERY 12 HOURS SCHEDULED
Status: DISCONTINUED | OUTPATIENT
Start: 2018-10-01 | End: 2018-10-04 | Stop reason: HOSPADM

## 2018-10-01 RX ORDER — MORPHINE SULFATE 30 MG/1
30 TABLET, FILM COATED, EXTENDED RELEASE ORAL EVERY 12 HOURS SCHEDULED
Status: DISCONTINUED | OUTPATIENT
Start: 2018-10-01 | End: 2018-10-04 | Stop reason: HOSPADM

## 2018-10-01 RX ORDER — HYDROMORPHONE HYDROCHLORIDE 1 MG/ML
0.5 INJECTION, SOLUTION INTRAMUSCULAR; INTRAVENOUS; SUBCUTANEOUS
Status: DISCONTINUED | OUTPATIENT
Start: 2018-10-01 | End: 2018-10-01

## 2018-10-01 RX ORDER — ALBUTEROL SULFATE 2.5 MG/3ML
2.5 SOLUTION RESPIRATORY (INHALATION) EVERY 6 HOURS PRN
Status: DISCONTINUED | OUTPATIENT
Start: 2018-10-01 | End: 2018-10-04 | Stop reason: HOSPADM

## 2018-10-01 RX ORDER — PROPOFOL 10 MG/ML
VIAL (ML) INTRAVENOUS AS NEEDED
Status: DISCONTINUED | OUTPATIENT
Start: 2018-10-01 | End: 2018-10-01 | Stop reason: SURG

## 2018-10-01 RX ORDER — NICOTINE POLACRILEX 4 MG
15 LOZENGE BUCCAL
Status: DISCONTINUED | OUTPATIENT
Start: 2018-10-01 | End: 2018-10-04 | Stop reason: HOSPADM

## 2018-10-01 RX ORDER — FAMOTIDINE 20 MG/1
20 TABLET, FILM COATED ORAL ONCE
Status: DISCONTINUED | OUTPATIENT
Start: 2018-10-01 | End: 2018-10-01 | Stop reason: HOSPADM

## 2018-10-01 RX ORDER — ATRACURIUM BESYLATE 10 MG/ML
INJECTION, SOLUTION INTRAVENOUS AS NEEDED
Status: DISCONTINUED | OUTPATIENT
Start: 2018-10-01 | End: 2018-10-01 | Stop reason: SURG

## 2018-10-01 RX ORDER — ONDANSETRON 2 MG/ML
4 INJECTION INTRAMUSCULAR; INTRAVENOUS EVERY 6 HOURS PRN
Status: DISCONTINUED | OUTPATIENT
Start: 2018-10-01 | End: 2018-10-04 | Stop reason: HOSPADM

## 2018-10-01 RX ORDER — PANTOPRAZOLE SODIUM 40 MG/1
40 TABLET, DELAYED RELEASE ORAL
Status: DISCONTINUED | OUTPATIENT
Start: 2018-10-01 | End: 2018-10-04 | Stop reason: HOSPADM

## 2018-10-01 RX ORDER — GLYCOPYRROLATE 0.2 MG/ML
INJECTION INTRAMUSCULAR; INTRAVENOUS AS NEEDED
Status: DISCONTINUED | OUTPATIENT
Start: 2018-10-01 | End: 2018-10-01 | Stop reason: SURG

## 2018-10-01 RX ORDER — FENTANYL CITRATE 50 UG/ML
INJECTION, SOLUTION INTRAMUSCULAR; INTRAVENOUS AS NEEDED
Status: DISCONTINUED | OUTPATIENT
Start: 2018-10-01 | End: 2018-10-01 | Stop reason: SURG

## 2018-10-01 RX ORDER — COLCHICINE 0.6 MG/1
0.6 TABLET ORAL DAILY
Status: DISCONTINUED | OUTPATIENT
Start: 2018-10-01 | End: 2018-10-04 | Stop reason: HOSPADM

## 2018-10-01 RX ORDER — LIDOCAINE HYDROCHLORIDE 10 MG/ML
0.5 INJECTION, SOLUTION EPIDURAL; INFILTRATION; INTRACAUDAL; PERINEURAL ONCE AS NEEDED
Status: DISCONTINUED | OUTPATIENT
Start: 2018-10-01 | End: 2018-10-01 | Stop reason: HOSPADM

## 2018-10-01 RX ADMIN — HYDROMORPHONE HYDROCHLORIDE 1 MG: 1 INJECTION, SOLUTION INTRAMUSCULAR; INTRAVENOUS; SUBCUTANEOUS at 12:23

## 2018-10-01 RX ADMIN — SODIUM CHLORIDE 100 ML/HR: 900 INJECTION INTRAVENOUS at 10:28

## 2018-10-01 RX ADMIN — FLECAINIDE ACETATE 50 MG: 50 TABLET ORAL at 21:31

## 2018-10-01 RX ADMIN — MORPHINE SULFATE 30 MG: 30 TABLET, EXTENDED RELEASE ORAL at 08:33

## 2018-10-01 RX ADMIN — COLCHICINE 0.6 MG: 0.6 TABLET, FILM COATED ORAL at 08:15

## 2018-10-01 RX ADMIN — EPHEDRINE SULFATE 10 MG: 50 INJECTION INTRAMUSCULAR; INTRAVENOUS; SUBCUTANEOUS at 16:36

## 2018-10-01 RX ADMIN — HYDROMORPHONE HYDROCHLORIDE 0.5 MG: 1 INJECTION, SOLUTION INTRAMUSCULAR; INTRAVENOUS; SUBCUTANEOUS at 08:15

## 2018-10-01 RX ADMIN — FLUOXETINE HYDROCHLORIDE 40 MG: 20 CAPSULE ORAL at 08:15

## 2018-10-01 RX ADMIN — Medication 3 MG: at 17:03

## 2018-10-01 RX ADMIN — HYDROMORPHONE HYDROCHLORIDE 1 MG: 1 INJECTION, SOLUTION INTRAMUSCULAR; INTRAVENOUS; SUBCUTANEOUS at 10:28

## 2018-10-01 RX ADMIN — MEROPENEM 1 G: 1 INJECTION, POWDER, FOR SOLUTION INTRAVENOUS at 21:31

## 2018-10-01 RX ADMIN — SODIUM CHLORIDE 100 ML/HR: 900 INJECTION INTRAVENOUS at 21:32

## 2018-10-01 RX ADMIN — PANTOPRAZOLE SODIUM 40 MG: 40 TABLET, DELAYED RELEASE ORAL at 06:24

## 2018-10-01 RX ADMIN — FENTANYL CITRATE 50 MCG: 50 INJECTION, SOLUTION INTRAMUSCULAR; INTRAVENOUS at 16:33

## 2018-10-01 RX ADMIN — HEPARIN SODIUM 5000 UNITS: 5000 INJECTION, SOLUTION INTRAVENOUS; SUBCUTANEOUS at 06:24

## 2018-10-01 RX ADMIN — ONDANSETRON 4 MG: 2 INJECTION INTRAMUSCULAR; INTRAVENOUS at 08:33

## 2018-10-01 RX ADMIN — MORPHINE SULFATE 30 MG: 30 TABLET, EXTENDED RELEASE ORAL at 21:31

## 2018-10-01 RX ADMIN — FLECAINIDE ACETATE 50 MG: 50 TABLET ORAL at 08:15

## 2018-10-01 RX ADMIN — HYDROMORPHONE HYDROCHLORIDE 1 MG: 1 INJECTION, SOLUTION INTRAMUSCULAR; INTRAVENOUS; SUBCUTANEOUS at 04:17

## 2018-10-01 RX ADMIN — VANCOMYCIN HYDROCHLORIDE 1500 MG: 10 INJECTION, POWDER, LYOPHILIZED, FOR SOLUTION INTRAVENOUS at 06:24

## 2018-10-01 RX ADMIN — SODIUM CHLORIDE 100 ML/HR: 9 INJECTION, SOLUTION INTRAVENOUS at 04:46

## 2018-10-01 RX ADMIN — HEPARIN SODIUM 5000 UNITS: 5000 INJECTION, SOLUTION INTRAVENOUS; SUBCUTANEOUS at 14:06

## 2018-10-01 RX ADMIN — FENTANYL CITRATE 50 MCG: 50 INJECTION, SOLUTION INTRAMUSCULAR; INTRAVENOUS at 16:32

## 2018-10-01 RX ADMIN — MEROPENEM 1 G: 1 INJECTION, POWDER, FOR SOLUTION INTRAVENOUS at 12:23

## 2018-10-01 RX ADMIN — BUDESONIDE 0.5 MG: 0.5 INHALANT RESPIRATORY (INHALATION) at 19:29

## 2018-10-01 RX ADMIN — HEPARIN SODIUM 5000 UNITS: 5000 INJECTION, SOLUTION INTRAVENOUS; SUBCUTANEOUS at 21:33

## 2018-10-01 RX ADMIN — GLYCOPYRROLATE 0.4 MG: 0.2 INJECTION, SOLUTION INTRAMUSCULAR; INTRAVENOUS at 17:02

## 2018-10-01 RX ADMIN — METOPROLOL TARTRATE 25 MG: 25 TABLET ORAL at 08:15

## 2018-10-01 RX ADMIN — MEROPENEM 1 G: 1 INJECTION, POWDER, FOR SOLUTION INTRAVENOUS at 05:19

## 2018-10-01 RX ADMIN — PROPOFOL 200 MG: 10 INJECTION, EMULSION INTRAVENOUS at 16:32

## 2018-10-01 RX ADMIN — ATRACURIUM BESYLATE 50 MG: 10 INJECTION, SOLUTION INTRAVENOUS at 16:32

## 2018-10-01 RX ADMIN — BUDESONIDE 0.25 MG: 0.5 INHALANT RESPIRATORY (INHALATION) at 08:39

## 2018-10-01 NOTE — NURSING NOTE
ACC REVIEW REPORT: [facility]        PATIENT NAME: Todd Phillips    PATIENT ID: [id number]    BED: [id number]    BED TYPE: TELEMETRY    BED GIVEN TO: TED HONG RN    TIME BED GIVEN: 2015    YOB: 1950    AGE: 68    GENDER: MALE    TODAY'S DATE: 9/30/2018    TRANSFER DATE: 9/30/18    TRANSFERRING FACILITY: [facility]    TRANSFERRING FACILITY PHONE # : [phone]    TRANSFERRING MD: DR. DO    DATE/TIME REQUEST RECEIVED: 9/30/181929    [facility] RN: WALDEMAR DUPREE RN    REPORT FROM: TED HONG RN    TIME REPORT TAKEN: 1958    DIAGNOSIS: SEPSIS    REASON FOR TRANSFER TO [facility]: PATIENTS MD IS AT [facility]    TRANSPORTATION: AMBULANCE    CLINICAL REASON FOR TRANSFER TO [facility]: ADM 9/30/18 WITH ABD PAIN,SHAKING AND FEVER. HAD NEEDLE  BX OF PANCREAS 9/28/18 AT [facility]. TEMP ON ADMISSION .3.      CLINICAL INFORMATION    ALLERGIES: CONTRAST DYE    PENN: NO    INFECTIOUS DISEASE: NO    ISOLATION: NO    LAST VITAL SIGNS:  TIME: 2000  TEMP: 99.7  PULSE: 105  B/P: 138/85  RESP: 18    MEDS/IV FLUIDS: LEFT AC 20G SL. RECEIVED 3 LITERS NS,ZOSYN,VANCOMYCIN AND TYLENOL SUPPOSITORY.  SEE MED SHEETS.       CARDIAC SYSTEM:    RHYTHM: SINUS TACHYCARDIA 100-110    Is patient taking or has patient been given any drugs that could increase bleeding? YES  ELIQUIS 5MG BID    RESPIRATORY SYSTEM:    OXYGEN: 2LPM NC    O2 SAT: 96%    RESPIRATORY STATUS: NO S/S OF RESPIRATORY DISTRESS      CNS/MUSCULOSKELETAL    ALERT AND ORIENTED:     TEQUILA COMA SCALE:    E: 4  M: 6  V: 4 CONFUSED    GI//GY      ABDOMINAL PAIN: YES    VOMITING: YES    DIARRHEA: NO    PAST MEDICAL HISTORY: COPD,CA PANCREAS,CAD,DM AND KIDNEY STONE      Waldemar Dupree RN  9/30/2018  8:04 PM

## 2018-10-01 NOTE — ANESTHESIA PREPROCEDURE EVALUATION
Anesthesia Evaluation     Patient summary reviewed and Nursing notes reviewed   NPO Solid Status: > 8 hours  NPO Liquid Status: > 4 hours           Airway   Mallampati: II  TM distance: >3 FB  Neck ROM: full  No difficulty expected  Dental    (+) implants    Pulmonary - normal exam   (+) COPD, sleep apnea,   Cardiovascular - normal exam    ECG reviewed    (+) hypertension, dysrhythmias Atrial Fib, hyperlipidemia,   (-) angina, FAIR    ROS comment: Negative stress 2017  Normal EF    Neuro/Psych  (+) psychiatric history Anxiety and Depression,     GI/Hepatic/Renal/Endo    (+)  GERD poorly controlled,  liver disease (elevated LFTS), diabetes mellitus,   (-) no renal disease, hypothyroidism    Musculoskeletal     Abdominal    Substance History      OB/GYN          Other      history of cancer (pancreatic cancer)                  Anesthesia Plan    ASA 4     general   Rapid sequence  intravenous induction   Anesthetic plan, all risks, benefits, and alternatives have been provided, discussed and informed consent has been obtained with: patient.    Plan discussed with CRNA.

## 2018-10-01 NOTE — ANESTHESIA PROCEDURE NOTES
Airway  Urgency: elective    Airway not difficult    General Information and Staff    Patient location during procedure: OR    Indications and Patient Condition  Indications for airway management: airway protection    Preoxygenated: yes  MILS not maintained throughout  Mask difficulty assessment: 2 - vent by mask + OA or adjuvant +/- NMBA    Final Airway Details  Final airway type: endotracheal airway      Successful airway: ETT  Cuffed: yes   Successful intubation technique: direct laryngoscopy and video laryngoscopy  Endotracheal tube insertion site: oral  Blade: Juancho  Blade size: 3  ETT size: 7.0 mm  Cormack-Lehane Classification: grade I - full view of glottis  Placement verified by: chest auscultation and capnometry   Measured from: lips  ETT to lips (cm): 20  Number of attempts at approach: 1    Additional Comments  Negative epigastric sounds, Breath sound equal bilaterally with symmetric chest rise and fall  Video laryngoscopy utilized after [id number] view with MAC 3

## 2018-10-01 NOTE — ANESTHESIA POSTPROCEDURE EVALUATION
Patient: Todd Phillips    Procedure Summary     Date:  10/01/18 Room / Location:   [facility]    Anesthesia Start:  1622 Anesthesia Stop:      Procedure:  ENDOSCOPIC RETROGRADE CHOLANGIOPANCREATOGRAPHY (N/A ) Diagnosis:       Biliary obstruction      Sepsis, due to unspecified organism (CMS/HCC)      (Biliary obstruction [K83.1])      (Sepsis, due to unspecified organism (CMS/HCC) [A41.9])    Surgeon:  Eb Jimenez MD Provider:  Jere Casanova MD    Anesthesia Type:  general ASA Status:  4          Anesthesia Type: general  Last vitals  BP   117/74 (10/01/18 1546)   Temp   97.6 °F (36.4 °C) (10/01/18 1546)   Pulse   86 (10/01/18 1546)   Resp   18 (10/01/18 1546)     SpO2   94 % (10/01/18 1546)     Post Anesthesia Care and Evaluation    Patient location during evaluation: PACU  Patient participation: complete - patient participated  Level of consciousness: awake and alert  Pain score: 0  Pain management: adequate  Airway patency: patent  Anesthetic complications: No anesthetic complications  PONV Status: none  Cardiovascular status: hemodynamically stable and acceptable  Respiratory status: nonlabored ventilation, acceptable and nasal cannula  Hydration status: acceptable

## 2018-10-01 NOTE — PLAN OF CARE
Problem: Fall Risk (Adult)  Goal: Identify Related Risk Factors and Signs and Symptoms  Outcome: Outcome(s) achieved Date Met: 10/01/18    Goal: Absence of Fall  Outcome: Outcome(s) achieved Date Met: 10/01/18      Problem: Sepsis/Septic Shock (Adult)  Goal: Signs and Symptoms of Listed Potential Problems Will be Absent, Minimized or Managed (Sepsis/Septic Shock)  Outcome: Ongoing (interventions implemented as appropriate)   10/01/18 1708   Goal/Outcome Evaluation   Problems Assessed (Sepsis) situational response   Problems Present (Sepsis) infection progression       Problem: Patient Care Overview  Goal: Plan of Care Review  Outcome: Ongoing (interventions implemented as appropriate)   10/01/18 1708   Coping/Psychosocial   Plan of Care Reviewed With family   Plan of Care Review   Progress no change     Goal: Individualization and Mutuality  Outcome: Ongoing (interventions implemented as appropriate)

## 2018-10-01 NOTE — PROGRESS NOTES
H and P reviewed. Patient admitted with recurrent bacteremia 1 day after stopping IV invanz. My concern is that his biliary stent is infected given multiple episodes of bacteremia and will need to be removed. ID and GI to see. Continue IV abx while awaiting input.

## 2018-10-01 NOTE — PLAN OF CARE
Problem: Fall Risk (Adult)  Goal: Identify Related Risk Factors and Signs and Symptoms  Outcome: Ongoing (interventions implemented as appropriate)    Goal: Absence of Fall  Outcome: Ongoing (interventions implemented as appropriate)      Problem: Sepsis/Septic Shock (Adult)  Goal: Signs and Symptoms of Listed Potential Problems Will be Absent, Minimized or Managed (Sepsis/Septic Shock)  Outcome: Ongoing (interventions implemented as appropriate)      Problem: Patient Care Overview  Goal: Plan of Care Review  Outcome: Ongoing (interventions implemented as appropriate)   10/01/18 0543   Coping/Psychosocial   Plan of Care Reviewed With patient;spouse;family;son   Plan of Care Review   Progress no change   OTHER   Outcome Summary Pt new admit this shift; having abdominal pain/diarrhea. VSS. ID and GI consults for today. Continue current POC

## 2018-10-01 NOTE — PROGRESS NOTES
Pharmacokinetic Consult - Vancomycin Dosing    Todd Phillips is a 68 y.o. male who has been consulted for vancomycin dosing for sepsis .    Relevant clinical data and objective history reviewed:  Lab Results   Component Value Date/Time    CREATININE 1.31 (H) 09/30/2018 05:09 PM    CREATININE 1.64 (H) 09/25/2018 02:39 PM    CREATININE 1.48 (H) 09/17/2018 03:44 AM    BUN 21 09/30/2018 05:09 PM    BUN 24 (H) 09/25/2018 02:39 PM    BUN 12 09/17/2018 03:44 AM     Estimated Creatinine Clearance: 63.8 mL/min (A) (by C-G formula based on SCr of 1.31 mg/dL (H)).  No intake/output data recorded.  Lab Results   Component Value Date/Time    WBC 5.57 09/30/2018 05:09 PM    HGB 12.5 (L) 09/30/2018 05:09 PM    HCT 37.3 (L) 09/30/2018 05:09 PM    MCV 88.6 09/30/2018 05:09 PM     09/30/2018 05:09 PM     Temp Readings from Last 3 Encounters:   10/01/18 97.7 °F (36.5 °C) (Oral)   09/30/18 99.6 °F (37.6 °C) (Oral)   09/28/18 98 °F (36.7 °C) (Temporal Artery )     Weight: 98.7 kg (217 lb 9.6 oz)  Baseline culture/source/susceptibility: Pending    Assessment/Plan  The patient will be started on vancomycin utilizing scheduled dosing.  Vancomycin 2000 mg IV x 1 (given), then Vancomycin 1500 mg IV Q12H (15 mg/kg/dose).  Plan for trough as patient approaches steady state, prior to the 4th dose (06:00 on Tuesday 10/2/18).  Due to infection severity, will target a trough of 15-20 ug/mL.  Pharmacy will continue to follow the patient’s culture results and clinical progress daily.    Scot Funes [facility]

## 2018-10-01 NOTE — H&P
[facility] Medicine Services  HISTORY AND PHYSICAL    Patient Name: Todd Phillips  : 1950  MRN: [id number]  Primary Care Physician: Adiel Denney MD  Date of admission: 10/1/2018      Subjective   Subjective     Chief Complaint:  Fever, abdominal pain    HPI:  Todd Phillips is a 68 y.o. male  with a PMH imaging concerning for pancreatic head mass status post biopsy on 18, A. Fib, COPD who presents to [facility] due to fever and abdominal pain.  Patient underwent a EUS with biopsy with Dr. Browning on .  He states that he was doing well after discharge until .  He began to feel poorly with symptoms worsening throughout the day.  Patient began to have a high fever with worsening abdominal pain, rigors, nausea and vomiting and presented to [facility] ED for further evaluation and treatment.  At presentation to OSH, patient was noted to have a fever of 103.    Patient initially presented to the ED in July with severe abdominal pain radiating to his back.  With initial work-up, he was noted to have an abnormal HIDA scan and underwent a cholecystectomy on 8-10-18.  She underwent an ERCP on 18 with Dr. Su in [place] and was noted to have an irregular tight stricture of the distal CBD with proximal biliary ductal dilation with pathology brushings obtained and biliary stent placement.  Shortly after his cholecystectomy patient was noted to have jaundice with worsening abdominal pain.  CT with contrast of the abdomen showed concerns for mass at the head of the pancreas.  He has continued to have persistent pain since that time and has been hospitalized on 2 separate occasions due to Klebsiella bacteremia (2018) and ESBL Escherichia coli bacteremia (2018).  Patient was also noted to have markedly elevation of CA 19-9. Patient just finished his second course of ertapenem this past Monday, 2018 to complete a 7 day  course.        Review of Systems   Otherwise 10-system ROS reviewed and is negative except as mentioned in the HPI.    Personal History     Past Medical History:   Diagnosis Date   • Abnormal ECG    • Antral gastritis    • Atrial fibrillation (CMS/HCC)     treated with oral blood thinner   • Chest pain    • COPD (chronic obstructive pulmonary disease) (CMS/HCC)    • Coronary artery disease     no stents   • Diabetes mellitus (CMS/HCC)     type 2    • Duodenitis    • Emphysema of lung (CMS/HCC)    • Gallbladder disease     removed    • GERD (gastroesophageal reflux disease)    • Hyperlipidemia    • Hypertension    • Kidney stone    • Kidney stones     had lithotripsy and passed 36 kidney stones as well as had one surgically removed.   • Malignant neoplasm of head of pancreas (CMS/HCC) 9/5/2018   • Palpitations    • Reflux esophagitis    • Renal failure     stage 3 kidney disease       Past Surgical History:   Procedure Laterality Date   • CARDIAC CATHETERIZATION  11/01/1999   • CARDIOVASCULAR STRESS TEST  09/2012   • CHOLECYSTECTOMY N/A 8/10/2018    Procedure: CHOLECYSTECTOMY LAPAROSCOPIC;  Surgeon: Ronak Downs MD;  Location: [facility];  Service: General   • CYSTOSCOPY BLADDER STONE LITHOTRIPSY     • ECHO - CONVERTED  09/2012   • ERCP N/A 8/14/2018    Procedure: ENDOSCOPIC RETROGRADE CHOLANGIOPANCREATOGRAPHY WITH PAPILLOTOMY;  Surgeon: Scot Su MD;  Location: [facility];  Service: Gastroenterology   • KIDNEY STONE SURGERY     • KIDNEY STONE SURGERY      open abdominal surgery   • UPPER GASTROINTESTINAL ENDOSCOPY  08/30/2012       Family History: family history includes Diabetes in his sister and sister; Heart attack in his brother and mother; Heart disease in his brother and mother; Heart failure in his brother and mother; Kidney disease in his mother; Lung disease in his father; No Known Problems in his brother and brother; Stroke in his mother; Tuberculosis in his father.     Social History:   reports that he has never smoked. He has never used smokeless tobacco. He reports that he does not drink alcohol or use drugs.  Social History     Social History Narrative    He is  and lives alone with his wife although they have 3 children together who all live close by. He is retired from [employer] and was exposed to Agent Orange during [place]. He is a lifelong nonsmoker.        Medications:  Available home medication information reviewed     Allergies   Allergen Reactions   • Contrast Dye Other (See Comments)     Can't have due to kidney failure per family       Objective   Objective     Vital Signs:   Temp:  [97.7 °F (36.5 °C)-98 °F (36.7 °C)] 97.7 °F (36.5 °C)  Heart Rate:  [79-90] 79  Resp:  [8-20] 8  BP: (141-155)/(90-94) 155/94        Physical Exam   Constitutional: No acute distress, awake, alert  Eyes: PERRLA, sclerae anicteric, no conjunctival injection  HENT: NCAT, mucous membranes moist  Neck: Supple, no thyromegaly, no lymphadenopathy, trachea midline  Respiratory: Clear to auscultation bilaterally, nonlabored respirations   Cardiovascular: RRR, no murmurs, rubs, or gallops, palpable pedal pulses bilaterally  Gastrointestinal: Hypoactive bowel sounds, soft, tender to palpation diffusely, nondistended  Musculoskeletal: No bilateral ankle edema, no clubbing or cyanosis to extremities  Psychiatric: Appropriate affect, cooperative  Neurologic: Oriented x 3, strength symmetric in all extremities, Cranial Nerves grossly intact to confrontation, speech clear  Skin: No rashes      Results Reviewed:  I have personally reviewed current lab, radiology, and data and agree.      Results from last 7 days  Lab Units 09/30/18  1709   WBC 10*3/mm3 5.57   HEMOGLOBIN g/dL 12.5*   HEMATOCRIT % 37.3*   PLATELETS 10*3/mm3 224   INR  0.99       Results from last 7 days  Lab Units 09/30/18  1910 09/30/18  1709   SODIUM mmol/L  --  137   POTASSIUM mmol/L  --  4.1   CHLORIDE mmol/L  --  104   CO2 mmol/L  --  21.8*    BUN mg/dL  --  21   CREATININE mg/dL  --  1.31*   GLUCOSE mg/dL  --  192*   CALCIUM mg/dL  --  9.5   ALT (SGPT) U/L  --  225*   AST (SGOT) U/L  --  235*   TROPONIN I ng/mL 0.007 <0.006     Estimated Creatinine Clearance: 63.8 mL/min (A) (by C-G formula based on SCr of 1.31 mg/dL (H)).  Brief Urine Lab Results  (Last result in the past 365 days)      Color   Clarity   Blood   Leuk Est   Nitrite   Protein   CREAT   Urine HCG        09/30/18 1837 Dark Yellow(A) Clear Negative Trace(A) Negative Negative             No results found for: BNP  Imaging Results (last 24 hours)     ** No results found for the last 24 hours. **        Results for orders placed during the hospital encounter of 10/22/17   Adult Transthoracic Echo Complete W/ Cont if Necessary Per Protocol    Narrative · Left ventricular systolic function is normal. Estimated EF = 60%.  · The cardiac valves are anatomically and functionally normal.          Assessment/Plan   Assessment / Plan     Hospital Problem List     * (Principal)Sepsis (CMS/[facility])    Hyperlipidemia    COPD (chronic obstructive pulmonary disease) (CMS/[facility])    Essential hypertension    Nonobstructive atherosclerosis of coronary artery    Overview Signed 10/22/2017 12:37 PM by Jono Cruz MD     [facility] 2014 dr. Reyes: 20% dz LAD, mild plaque         CKD stage 3 secondary to diabetes (CMS/[facility])    Overview Signed 10/22/2017 12:37 PM by Jono Cruz MD     Baseline creatinine 1.4-1.5         DM2 (diabetes mellitus, type 2) (CMS/[facility])    Paroxysmal atrial fibrillation    Malignant neoplasm of head of pancreas (CMS/[facility])    Overview Signed 9/5/2018  9:58 AM by Olya Vaughn     Added automatically from request for surgery [id number]         Fever    Abdominal pain    Nausea vomiting and diarrhea        Is a 68-year-old male patient with past medical history significant for concerns for malignant neoplasm in the pancreas with recent biopsy obtained on 9/28/18 who presents to outside ED  due to fever, abdominal pain meeting criteria for sepsis who recently completed antibiotic treatment for ESBL Escherichia coli.    Assessment & Plan:  Sepsis with a recent history of ESBL Escherichia coli bacteremia in a patient with concerns for malignant neoplasm of the head of the pancreas  -agent recently completed 7 day ertapenem tx  -gastroenterology, infectious disease consult  -Blood cultures ×2 pending  -Start meropenem and vancomycin  -IV fluids  -IV pain meds when necessary    Nausea/Vomiting/Diarrhea   -zofran when necessary  -Will get stool studies/C. Difficile due to recent antibiotic    Paroxysmal atrial fibrillation  -Patient was recommended to hold Eliquis until Tuesday 10/2/18  -Continue home meds    Hypertention/hyperlipidemia  -C/W home regimen    Diabetes mellitus type II  -SSI with accuchecks    CKD stage III  -at baseline, monitor    COPD  -Not in an exacerbation  -c/w home meds    DVT prophylaxis:    CODE STATUS:    Code Status and Medical Interventions:   Ordered at: 10/01/18 0420     Level Of Support Discussed With:    Patient     Code Status:    CPR     Medical Interventions (Level of Support Prior to Arrest):    Full       Admission Status:  I believe this patient meets INPATIENT status due to the need for care which can only be reasonably provided in an hospital setting such as aggressive/expedited ancillary services and/or consultation services, the necessity for IV medications, close physician monitoring and/or the possible need for procedures.  In such, I feel patient’s risk for adverse outcomes and need for care warrant INPATIENT evaluation and predict the patient’s care encounter to likely last beyond 2 midnights.      Electronically signed by Milana Villeda DO, 10/01/18, 3:38 AM.

## 2018-10-01 NOTE — CONSULTS
Todd Phillips  1950  [id number]    Date of Consult: 10/1/2018    10/1/2018      Requesting Provider: Tommie Aparicio MD  Evaluating Physician: Scot Higgins MD    Chief Complaint: Abdominal pain    Reason for Consultation: Acute bacteremia/sepsis    History of present illness:    Patient is a 68 y.o.  Yr old male with history of biliary tract obstruction, admitted to [facility] multiple times in the last 2 months including August 10 until August 16, underwent cholecystectomy with pathology suggesting chronic cholecystitis, had ERCP on August 14 with biliary stricture/proximal duct dilation and had stent placement.  He was readmitted August 21, 2018 until August 25, 2018 with acute sepsis, Klebsiella bacteremia, discharged with oral antibiotics.  Readmitted September 12, 2018 until September 17, 2018 with ESBL Escherichia coli bacteremia seen by infectious disease in [place] and treated with Invanz, duration of therapy unclear but approximately until September 24.  During that period of time, concern for malignancy mentioned in notes and referred to [facility] for further biopsy, done September 28.  Presented once again to [facility] emergency room September 30 with acute onset abdominal pain/nausea/vomiting, delirium and fever to 103/ tachy to 115.  Blood cultures positive again with initial PCR Klebsiella species and no carbopenem resistance per micro there by PCR.  Transferred to [facility].    He reports abdominal pain, diffuse, nonradiating, worse with palpation, better with pain meds, 8 out of 10 and associated with jaundice.  Recent nausea/vomiting but not today.  No diarrhea.  No hematochezia melena or hematemesis.    No headache photophobia or neck stiffness.  No shortness of breath cough or hemoptysis.  No dysuria hematuria or pyuria.  No other new skin rash or exposure.    No raw or undercooked food.  No unpasteurized milk or milk products.  No  sheep/goat/cattle/livestock exposure No animal insect or arthropod exposure.  No tick bites.  No outdoor camping or hunting exposure.  No travel exposure.  No ill exposure.  No history of TB or TB exposure.   Denies a history of MRSA/VRE and no history of C. difficile or KPC  organisms.    --    Past Medical History:   Diagnosis Date   • Abnormal ECG    • Antral gastritis    • Atrial fibrillation (CMS/HCC)     treated with oral blood thinner   • Chest pain    • COPD (chronic obstructive pulmonary disease) (CMS/[facility])    • Coronary artery disease     no stents   • Diabetes mellitus (CMS/HCC)     type 2    • Duodenitis    • Emphysema of lung (CMS/HCC)    • Gallbladder disease     removed    • GERD (gastroesophageal reflux disease)    • Hyperlipidemia    • Hypertension    • Kidney stone    • Kidney stones     had lithotripsy and passed 36 kidney stones as well as had one surgically removed.   • Malignant neoplasm of head of pancreas (CMS/[facility]) 9/5/2018   • Palpitations    • Reflux esophagitis    • Renal failure     stage 3 kidney disease       Past Surgical History:   Procedure Laterality Date   • CARDIAC CATHETERIZATION  11/01/1999   • CARDIOVASCULAR STRESS TEST  09/2012   • CHOLECYSTECTOMY N/A 8/10/2018    Procedure: CHOLECYSTECTOMY LAPAROSCOPIC;  Surgeon: Ronak Downs MD;  Location: [facility];  Service: General   • CYSTOSCOPY BLADDER STONE LITHOTRIPSY     • ECHO - CONVERTED  09/2012   • ERCP N/A 8/14/2018    Procedure: ENDOSCOPIC RETROGRADE CHOLANGIOPANCREATOGRAPHY WITH PAPILLOTOMY;  Surgeon: Scot Su MD;  Location: [facility];  Service: Gastroenterology   • KIDNEY STONE SURGERY     • KIDNEY STONE SURGERY      open abdominal surgery   • UPPER GASTROINTESTINAL ENDOSCOPY  08/30/2012       Pediatric History   Patient Guardian Status   • Not on file.     Other Topics Concern   • Not on file     Social History Narrative    He is  and lives alone with his wife although they have 3 children  together who all live close by. He is retired from [employer] and was exposed to Agent Orange during [place]. He is a lifelong nonsmoker.        family history includes Diabetes in his sister and sister; Heart attack in his brother and mother; Heart disease in his brother and mother; Heart failure in his brother and mother; Kidney disease in his mother; Lung disease in his father; No Known Problems in his brother and brother; Stroke in his mother; Tuberculosis in his father.    Allergies   Allergen Reactions   • Contrast Dye Other (See Comments)     Can't have due to kidney failure per family       Medication:  Current Facility-Administered Medications   Medication Dose Route Frequency Provider Last Rate Last Dose   • [START ON 10/2/2018] ! Vancomycin Trough level before 06:00 dose on Tuesday 10/2/18; Hold dose until level evaluated   Does not apply Once Scot Funes, [facility]       • albuterol (PROVENTIL) nebulizer solution 0.083% 2.5 mg/3mL  2.5 mg Nebulization Q6H PRN Milana Villeda G, DO       • budesonide (PULMICORT) nebulizer solution 0.25 mg  0.25 mg Nebulization BID - RT RonalMilana G, DO       • colchicine tablet 0.6 mg  0.6 mg Oral Daily Ronal, Milana G, DO       • dextrose (D50W) 25 g/ 50mL Intravenous Solution 25 g  25 g Intravenous Q15 Min PRN Ronal, Milana G, DO       • dextrose (GLUTOSE) oral gel 15 g  15 g Oral Q15 Min PRN Ronal, Milana G, DO       • flecainide (TAMBOCOR) tablet 50 mg  50 mg Oral Q12H RonalMilana G, DO       • FLUoxetine (PROzac) capsule 40 mg  40 mg Oral Daily Ronal, Milana G, DO       • glucagon (human recombinant) (GLUCAGEN DIAGNOSTIC) injection 1 mg  1 mg Subcutaneous PRN Ronal, Milana G, DO       • heparin (porcine) 5000 UNIT/ML injection 5,000 Units  5,000 Units Subcutaneous Q8H RonalMilana coley G, DO   5,000 Units at 10/01/18 0624   • HYDROmorphone (DILAUDID) injection 0.5 mg  0.5 mg Intravenous Q3H PRN RonalMilana G, DO       • insulin lispro (humaLOG) injection 0-7 Units   0-7 Units Subcutaneous 4x Daily With Meals & Nightly Milana Villeda G, DO       • meropenem (MERREM) 1 g/100 mL 0.9% NS VTB (mbp)  1 g Intravenous Q8H Milana Villeda G, DO       • metoprolol tartrate (LOPRESSOR) tablet 25 mg  25 mg Oral Daily RonalMilana coley G, DO       • Morphine (MS CONTIN) 12 hr tablet 30 mg  30 mg Oral Q12H Milana Villeda G, DO       • ondansetron (ZOFRAN) injection 4 mg  4 mg Intravenous Q6H PRN Milana Vlileda G, DO       • pantoprazole (PROTONIX) EC tablet 40 mg  40 mg Oral Q AM Milana Villeda, DO   40 mg at 10/01/18 0624   • Pharmacy to dose vancomycin   Does not apply Continuous PRN Milana Villeda G, DO       • sodium chloride 0.9 % flush 1-10 mL  1-10 mL Intravenous PRN Milana Villeda, DO       • vancomycin 1500 mg/500 mL 0.9% NS IVPB ([facility])  15 mg/kg Intravenous Q12H Scot Funes, RP   1,500 mg at 10/01/18 0624       Antibiotics:  Anti-Infectives     Ordered     Dose/Rate Route Frequency Start Stop    10/01/18 0420  meropenem (MERREM) 1 g/100 mL 0.9% NS VTB (mbp)     Ordering Provider:  Milana Villeda, DO    1 g  over 3 Hours Intravenous Every 8 Hours 10/01/18 1200 10/08/18 1159    10/01/18 0430  vancomycin 1500 mg/500 mL 0.9% NS IVPB ([facility])     Ordering Provider:  Scot Funes RPH    15 mg/kg × 98.7 kg  over 90 Minutes Intravenous Every 12 Hours 10/01/18 0600 10/08/18 0559    10/01/18 0420  meropenem (MERREM) 1 g/100 mL 0.9% NS VTB (mbp)     Ordering Provider:  Milana Villeda, DO    1 g  over 30 Minutes Intravenous Once 10/01/18 0515 10/01/18 0549    10/01/18 0420  Pharmacy to dose vancomycin     Ordering Provider:  Milana Villeda G, DO     Does not apply Continuous PRN 10/01/18 0418 10/08/18 0417            Review of Systems    Constitutional-- had recent chills/sweats, diminished appetite and generalized fatigue with malaise  Heent-- No new vision, hearing or throat complaints.  No epistaxis or oral sores.  Denies odynophagia or dysphagia.  No flashers, floaters or eye pain. No  "odynophagia or dysphagia. No headache, photophobia or neck stiffness.  CV-- No chest pain, palpitation or syncope  Resp-- No SOB/cough/Hemoptysis  GI- No  diarrhea.  No hematochezia, melena, or hematemesis. Denies jaundice or chronic liver disease.  -- No dysuria, hematuria, or flank pain.  Denies hesitancy, urgency or flank pain.  Lymph- no swollen lymph nodes in neck/axilla or groin.   Heme- No active bruising or bleeding; no Hx of DVT or PE.  MS-- no swelling or pain in the bones or joints of arms/legs.  No new back pain.  Neuro-- No acute focal weakness or numbness in the arms or legs.  No seizures.    Full 12 point review of systems reviewed and negative otherwise for acute complaints, except for above    Physical Exam: Nursing/chaperone present  Vital Signs   /85   Pulse 71   Temp 97.9 °F (36.6 °C) (Oral)   Resp 18   Ht 182.9 cm (72\")   Wt 98.7 kg (217 lb 9.6 oz)   SpO2 98%   BMI 29.51 kg/m²     GENERAL: Awake and alert, in no acute distress. Nc O2  HEENT: Normocephalic, atraumatic.  PERRL. EOMI. No conjunctival injection. +icterus. Oropharynx clear without evidence of thrush or exudate. No evidence of peridontal disease.    NECK: Supple without nuchal rigidity. No mass.  LYMPH: No cervical, axillary or inguinal lymphadenopathy.  HEART: RRR; No murmur, rubs, gallops.   LUNGS: Clear to auscultation bilaterally without wheezing, rales, rhonchi. Normal respiratory effort. Nonlabored. No dullness.  ABDOMEN: Soft, tender, nondistended. Positive bowel sounds. No rebound or guarding. NO mass or HSM.  EXT:  No cyanosis, clubbing or edema. No cord.  : Genitalia generally unremarkable.  Without Hassan catheter.  MSK: FROM without joint effusions noted arms/legs.    SKIN: Warm and dry without cutaneous eruptions on Inspection/palpation.    NEURO: Oriented to PPT. No focal deficits on motor/sensory exam at arms/legs.  PSYCHIATRIC: Normal insight and judgement. Cooperative with PE    No peripheral " stigmata/phenomena of endocarditis    Laboratory Data      Results from last 7 days  Lab Units 10/01/18  0459 09/30/18  1709 09/25/18  1439   WBC 10*3/mm3 10.17 5.57 5.68   HEMOGLOBIN g/dL 11.2* 12.5* 13.7*   HEMATOCRIT % 33.6* 37.3* 40.4*   PLATELETS 10*3/mm3 190 224 236       Results from last 7 days  Lab Units 10/01/18  0459   SODIUM mmol/L 135   POTASSIUM mmol/L 3.9   CHLORIDE mmol/L 105   CO2 mmol/L 24.0   BUN mg/dL 17   CREATININE mg/dL 1.22   GLUCOSE mg/dL 167*   CALCIUM mg/dL 8.3*       Results from last 7 days  Lab Units 10/01/18  0459   ALK PHOS U/L 352*   BILIRUBIN mg/dL 4.4*   ALT (SGPT) U/L 202*   AST (SGOT) U/L 190*           Results from last 7 days  Lab Units 09/25/18  1439   CRP mg/dL <0.50       Estimated Creatinine Clearance: 68.5 mL/min (by C-G formula based on SCr of 1.22 mg/dL).      Microbiology:      Radiology:  Imaging Results (last 72 hours)     ** No results found for the last 72 hours. **        --CT abdomen done September 30 in [place] with radiology report indicating ill-defined soft tissue density in the pancreatic head and biliary duct stent appear unchanged.  Mild increased intrahepatic biliary ductal dilation    Impression:     --Acute severe sepsis, febrile/tachycardic with confusion and acute kidney injury at presentation to [facility] September 30.  Sepsis parameters improved with better hemodynamics, fever decreased and creatinine improved.  Intra-abdominal source is primary concern.  Broad-spectrum antimicrobials until further data.  Gram-negative kassie in blood culture at [facility]    --Acute gram-negative kassie septicemia.  History as outlined above with Klebsiella in August, ESBL Escherichia coli in September and gram-negative kassie on this admission with preliminary PCR Klebsiella and no carbopenem resistance per micro at [place] (I d/w them directly)    --Acute/recurrent cholangitis with prior cholecystectomy, pathology showing chronic cholecystitis  requiring postoperative ERCP and obstruction noted, status post stent in August and abnormal liver function tests, cholestasis and abdominal pain with jaundice and possible malignancy.  He requires further consideration for intervention regarding his obstruction, to help minimize risk for relapsing infection.  Surgery/GI to help define option timing and threshold for further surgery/intervention.  Pathology pending regarding recent biopsies.  Patient understands high risk for further infection, potential progressive sepsis and other serious sequela/mortality etc.     --Acute kidney injury.  Creatinine trending down with resuscitation.  Likely prerenal associated with sepsis    --Acute encephalopathy, improved per family and likely metabolic associated with sepsis    --Chronic kidney disease stage III per records.  Monitor to help guide further adjustments in antimicrobials    --COPD per notes    --Diabetes type 2.      PLAN: Thank you for asking us to see Todd Phillips, I recommend the following:    --IV Merrem    --I discussed potential risks and benefits of the prescribed antibiotics that include, but are not limited to, solid organ toxicity, neuro toxicity, renal toxicity, CDiff, cytopenias, hypersensitivity,etc.. Patient/Family voice understanding and agree to proceed.    --Check/review labs cultures and scans     --History per nursing and family    --Discussed with microbiology    --Highly complex set of issues with high risk for further serious morbidity and other serious sequela    Scot Higgins MD  10/1/2018

## 2018-10-01 NOTE — ED NOTES
[facility] called back, said he didn't have a medic to send out of town right now, said he could do BLS with a nurse. Let Blu Alexander know.     Phyllis Craven  09/30/18 2056    
[facility] said they had a truck out of town for a couple more hours, but would take this transfer when they returned.     Phyllis Craven  09/30/18 2037    
[facility] here for patient transfer.     Phyllis Craven  09/30/18 6961    
Pt leaving ED at this time. No distress noted. Skin warm pink and dry. Respirations even and unlabored. No skin issues noted. IV saline locked, no signs of infiltration noted. Oxygen 2 L going. [place]ala sent in folder with EMS.      Radha Meier RN  09/30/18 3203    
Spoke to [facility] dispatch for possible transport to [facility]. Said they would talk to [facility] and return my phone call.     Phyllis Craven  09/30/18 2013    
Spoke to [facility] dispatch to see if they could take the transfer any quicker, said they would talk to [facility] and return my phone call.     Phyllis Craven  09/30/18 2037    
Spoke to [facility] for possible transfer to [facility]. Dispatch spoke to [facility] and they said they didn't have enough trucks to take an out of town.     Phyllis Craven  09/30/18 2007    
[facility] room [facility]  [phone]  Please call when pt leaves     Radha Meier RN  09/30/18 2015    
NONE

## 2018-10-01 NOTE — CONSULTS
[facility] [place]      Name:JOSE YA : 1950 MRN# [id number]  Date of Service: 10/01/18   Admitted 10/1/2018  1:34 AM  LOS: 0 days Room N644/1  Reason for Consultation: abd pain  CC: abdominal pain   Assessment/Plan                                             ASSESSMENT & PLANS   68 y.o. male who is admitted on 10/1/2018 for Sepsis (CMS/HCC) [A41.9]    Sepsis w/ recurrent gram-negtivebacteremia. Has multiple recent procedures and surgery. Currently on Merrem and IV Vanc.  Pt has defervesced.  JOVANNA d/t sepsis, dehydration, and abx  New onset of diarrhea (non-bloody)  · abx per ID  · Supportive care w/ hydration, pain meds, and anti-emetic.  Pt currently no on IV d/t unclear reason.  Restart IV w/ normal saline at 100 mL/hr.    · Stool for CDiff    Pancreatic mass. Elevated CA 19-9 up to 7600. Recent EUS 18. Concerned for malignancy  · Follow up on path    Principal Problem:    Sepsis (CMS/HCC)  Active Problems:    Hyperlipidemia    COPD (chronic obstructive pulmonary disease) (CMS/HCC)    Essential hypertension    Nonobstructive atherosclerosis of coronary artery    CKD stage 3 secondary to diabetes (CMS/HCC)    DM2 (diabetes mellitus, type 2) (CMS/HCC)    Paroxysmal atrial fibrillation    Malignant neoplasm of head of pancreas (CMS/HCC)    Fever    Abdominal pain    Nausea vomiting and diarrhea         Subjective                                                     SUBJECTIVE     HPI:Mr. Jose Ya is a 68 y.o. male who underwent a cholecystectomy on 8/10/18, but pt returned jaundice and worsening abd pain. He was found to have a stricture in the bile duct, and underwent ERCP w/ stent placement on 18 with Dr. Su in [place]. CBD duct brushing was negative for malignancy.  CT with contrast of the abdomen showed concerns for mass at the head of the pancreas.  He underwent EUS on 18 with pancreatic head mass biopsy with Dr. Browning.  He was doing well after discharge until  Sunday.  He began to feel poorly with symptoms worsening throughout the day.  Patient began to have a high fever of 103 with worsening abdominal pain, rigors, nausea and vomiting and presented to [facility] ED for further evaluation and treatment.       He reports abdominal pain, diffuse, nonradiating, worse with palpation, better with pain meds, 8 out of 10 and associated with jaundice.  Abd persists and minimally controlled w/ pain meds per pt. Recent nausea/vomiting but not today.  New on set of diarrhea since recent abx.  No hematochezia melena or hematemesis. Last BM this morning, but stools yet collected for CDiff    Repeated CT scan yesterday, showed a mild intrahepatic ductal dilatation, indistinct density in the pancreatic head and common duct stent in placed.   Pt has been hospitalized recently on 2 separate occasions due to Klebsiella bacteremia (8/2018) and ESBL Escherichia coli bacteremia (9/17/2018).  Patient was also noted to have markedly elevation of CA 19-9. Patient just finished his second course of ertapenem this past Monday, September 24, 2018 to complete a 7 day course.  Last colonoscopy, done in 1/2018 by Dr Ignacio, per pt, show polyps.  Has colonoscopy every 5 yrs    ROS: Complete 14-system ROS performed & documented w/ pertinent findings included in HPI.  All other sys are negative.  Subjective   PAST MED HX: Pt has a past medical history of Abnormal ECG; Antral gastritis; Atrial fibrillation (CMS/HCC); Chest pain; COPD (chronic obstructive pulmonary disease) (CMS/HCC); Coronary artery disease; Diabetes mellitus (CMS/HCC); Duodenitis; Emphysema of lung (CMS/HCC); Gallbladder disease; GERD (gastroesophageal reflux disease); Hyperlipidemia; Hypertension; Kidney stone; Kidney stones; Malignant neoplasm of head of pancreas (CMS/HCC) (9/5/2018); Palpitations; Reflux esophagitis; and Renal failure.  PAST SURG HX: Pt has a past surgical history that includes Upper gastrointestinal  endoscopy (08/30/2012); Cardiac catheterization (11/01/1999); Kidney stone surgery; Echo - Converted (09/2012); Cardiovascular stress test (09/2012); Cholecystectomy (N/A, 8/10/2018); ERCP (N/A, 8/14/2018); cystoscopy bladder stone lithotripsy; and Kidney stone surgery.  FAM HX: family history includes Diabetes in his sister and sister; Heart attack in his brother and mother; Heart disease in his brother and mother; Heart failure in his brother and mother; Kidney disease in his mother; Lung disease in his father; No Known Problems in his brother and brother; Stroke in his mother; Tuberculosis in his father.  SOC HX: Pt reports that he has never smoked. He has never used smokeless tobacco. He reports that he does not drink alcohol or use drugs.  Objective                                                           OBJECTIVE   Allergy: Pt is allergic to contrast dye.  Scheduled Meds  [START ON 10/2/2018] Pharmacy Consult  Does not apply Once   budesonide 0.25 mg Nebulization BID - RT   colchicine 0.6 mg Oral Daily   flecainide 50 mg Oral Q12H   FLUoxetine 40 mg Oral Daily   heparin (porcine) 5,000 Units Subcutaneous Q8H   insulin lispro 0-7 Units Subcutaneous 4x Daily With Meals & Nightly   meropenem 1 g Intravenous Q8H   metoprolol tartrate 25 mg Oral Daily   Morphine 30 mg Oral Q12H   pantoprazole 40 mg Oral Q AM   vancomycin 15 mg/kg Intravenous Q12H     Infusions  Pharmacy to dose vancomycin      PRN Medsalbuterol  •  dextrose  •  dextrose  •  glucagon (human recombinant)  •  HYDROmorphone  •  ondansetron  •  [DISCONTINUED] vancomycin **AND** Pharmacy to dose vancomycin  •  sodium chloride  Home Meds  Prescriptions Prior to Admission   Medication Sig Dispense Refill Last Dose   • albuterol (PROVENTIL HFA;VENTOLIN HFA) 108 (90 BASE) MCG/ACT inhaler Inhale 2 puffs Every 4 (Four) Hours As Needed for Wheezing or Shortness of Air.   9/27/2018 at Unknown time   • [START ON 10/2/2018] apixaban (ELIQUIS) 5 MG tablet tablet  Take 1 tablet by mouth Every 12 (Twelve) Hours. 60 tablet 0    • cholecalciferol (VITAMIN D3) 1000 units tablet Take 1,000 Units by mouth Daily.   9/27/2018 at Unknown time   • colchicine 0.6 MG tablet Take 1 tablet by mouth Daily. 90 tablet 2 9/27/2018 at Unknown time   • flecainide (TAMBOCOR) 50 MG tablet Take 1 tablet by mouth Every 12 (Twelve) Hours. 60 tablet 0 9/27/2018 at Unknown time   • FLUoxetine (PROzac) 20 MG capsule Take 40 mg by mouth Every Morning.   9/27/2018 at Unknown time   • FLUoxetine (PROzac) 20 MG capsule Take 20 mg by mouth Every Night.   9/27/2018 at Unknown time   • lansoprazole (PREVACID) 30 MG capsule Take 30 mg by mouth Daily.   9/27/2018 at Unknown time   • metoprolol tartrate (LOPRESSOR) 50 MG tablet Take 25 mg by mouth Daily.   9/27/2018 at Unknown time   • mometasone (ASMANEX) 220 MCG/INH inhaler Inhale 2 puffs Every Night.   9/27/2018 at Unknown time   • montelukast (SINGULAIR) 10 MG tablet Take 10 mg by mouth Daily.   9/27/2018 at Unknown time   • Morphine (MS CONTIN) 30 MG 12 hr tablet Take 1 tablet by mouth Every 12 (Twelve) Hours. 60 tablet 0 9/28/2018 at Unknown time   • nitroglycerin (NITROSTAT) 0.4 MG SL tablet Place 0.4 mg under the tongue as needed for chest pain.   Past Month at Unknown time   • ondansetron ODT (ZOFRAN-ODT) 4 MG disintegrating tablet Dissolve 1 tablet on tongue Every 6 (Six) Hours As Needed for Nausea or Vomiting. 30 tablet 0 9/27/2018 at Unknown time   • oxyCODONE (ROXICODONE) 5 MG immediate release tablet Take 1-3 tabs every 4 hours as needed for pain 200 tablet 0 9/27/2018 at Unknown time   • pioglitazone (ACTOS) 30 MG tablet Take 30 mg by mouth Daily.   9/28/2018 at Unknown time   • polyethylene glycol (MIRALAX) packet Take 17 g by mouth Daily As Needed.   9/27/2018 at Unknown time   • sore muscle (ICY HOT EXTRA STRENGTH) 10-30 % cream cream Apply 1 application topically to the appropriate area as directed 3 (Three) Times a Day As Needed (local pain).    Past Week at Unknown time   • Tiotropium Bromide-Olodaterol 2.5-2.5 MCG/ACT aerosol solution Inhale 2 puffs Daily.   9/27/2018 at Unknown time     Lab Results   Component Value Date     7602 (H) 09/25/2018     3636 (H) 09/07/2018     4032 (H) 08/15/2018           Results from last 7 days  Lab Units 10/01/18  0459 09/30/18  1709 09/25/18  1439   HEMOGLOBIN g/dL 11.2* 12.5* 13.7*   HEMATOCRIT % 33.6* 37.3* 40.4*       Results from last 7 days  Lab Units 10/01/18  0459 09/30/18  1709 09/30/18  1655 09/25/18  1439   WBC 10*3/mm3 10.17 5.57  --  5.68   MCV fL 88.7 88.6  --  88.2   PLATELETS 10*3/mm3 190 224  --  236   BUN / CREAT RATIO  13.9 16.0  --  14.6   INR   --  0.99  --   --    CRP mg/dL  --   --   --  <0.50   LACTATE mmol/L 1.1  --  1.4  --    PROCALCITONIN ng/mL 17.63*  --   --   --    ANION GAP mmol/L 6.0 11.2  --  8.1       Results from last 7 days  Lab Units 10/01/18  0459 09/30/18  1709 09/25/18  1439   AST (SGOT) U/L 190* 235* 319*   ALT (SGPT) U/L 202* 225* 399*   ALK PHOS U/L 352* 475* 474*   BILIRUBIN mg/dL 4.4* 3.2* 1.1   ALBUMIN g/dL 3.30 4.00 4.30   LIPASE U/L  --  24  --    AMYLASE U/L  --  52  --    BUN mg/dL 17 21 24*   CREATININE mg/dL 1.22 1.31* 1.64*   EGFR IF NONAFRICN AM mL/min/1.73 59* 54* 42*   SODIUM mmol/L 135 137 137   POTASSIUM mmol/L 3.9 4.1 4.1   MAGNESIUM mg/dL  --  1.5*  --    CO2 mmol/L 24.0 21.8* 25.9   GLUCOSE mg/dL 167* 192* 126*     Temp  Min: 97.7 °F (36.5 °C)  Max: 103.3 °F (39.6 °C)   BP  Min: 138/84  Max: 155/94   Pulse  Min: 71  Max: 124   Resp  Min: 8  Max: 24   SpO2  Min: 90 %  Max: 98 %   Body mass index is 29.51 kg/m².  Physical Exam  General Well developed; well nourished; no acute distress.   ENT Oral mucosa pink and moist without thrush or lesions.    Neck Neck supple; trachea midline. No thyromegaly   Resp CTA; no rhonchi, rales, or wheezes.  Respiration effort normal  CV RRR; normal S1, S2; no M/R/G. No lower extremity edema  GI Abd soft, diffused  TTP, ND, normal active bowel sounds.  No abd hernia  Skin No rash; no lesions; no bruises.  Skin turgor normal  Musc No clubbing; no cyanosis.  Moving all extremities  Psych Oriented to time, place, and person.  Appropriate affect    Marilu Saravia APRN  [phone]

## 2018-10-01 NOTE — OUTREACH NOTE
COPD/PN Week 2 Survey      Responses   Facility patient discharged from?  [facility]   Does the patient have one of the following disease processes/diagnoses(primary or secondary)?  COPD/Pneumonia   Was the primary reason for admission:  Pneumonia   Week 2 attempt successful?  No   Revoke  Readmitted          Allie Gilliland RN

## 2018-10-02 LAB
ALBUMIN SERPL-MCNC: 2.94 G/DL (ref 3.2–4.8)
ALBUMIN/GLOB SERPL: 1.6 G/DL (ref 1.5–2.5)
ALP SERPL-CCNC: 291 U/L (ref 25–100)
ALT SERPL W P-5'-P-CCNC: 176 U/L (ref 7–40)
ANION GAP SERPL CALCULATED.3IONS-SCNC: 7 MMOL/L (ref 3–11)
AST SERPL-CCNC: 170 U/L (ref 0–33)
BASOPHILS # BLD AUTO: 0.01 10*3/MM3 (ref 0–0.2)
BASOPHILS NFR BLD AUTO: 0.1 % (ref 0–1)
BILIRUB SERPL-MCNC: 4.9 MG/DL (ref 0.3–1.2)
BUN BLD-MCNC: 26 MG/DL (ref 9–23)
BUN/CREAT SERPL: 19.5 (ref 7–25)
CALCIUM SPEC-SCNC: 8.4 MG/DL (ref 8.7–10.4)
CHLORIDE SERPL-SCNC: 104 MMOL/L (ref 99–109)
CO2 SERPL-SCNC: 26 MMOL/L (ref 20–31)
CREAT BLD-MCNC: 1.33 MG/DL (ref 0.6–1.3)
DEPRECATED RDW RBC AUTO: 48.1 FL (ref 37–54)
EOSINOPHIL # BLD AUTO: 0.03 10*3/MM3 (ref 0–0.3)
EOSINOPHIL NFR BLD AUTO: 0.4 % (ref 0–3)
ERYTHROCYTE [DISTWIDTH] IN BLOOD BY AUTOMATED COUNT: 14.7 % (ref 11.3–14.5)
GFR SERPL CREATININE-BSD FRML MDRD: 53 ML/MIN/1.73
GLOBULIN UR ELPH-MCNC: 1.9 GM/DL
GLUCOSE BLD-MCNC: 98 MG/DL (ref 70–100)
GLUCOSE BLDC GLUCOMTR-MCNC: 103 MG/DL (ref 70–130)
GLUCOSE BLDC GLUCOMTR-MCNC: 108 MG/DL (ref 70–130)
GLUCOSE BLDC GLUCOMTR-MCNC: 108 MG/DL (ref 70–130)
GLUCOSE BLDC GLUCOMTR-MCNC: 128 MG/DL (ref 70–130)
HCT VFR BLD AUTO: 33.6 % (ref 38.9–50.9)
HGB BLD-MCNC: 11.1 G/DL (ref 13.1–17.5)
IMM GRANULOCYTES # BLD: 0.02 10*3/MM3 (ref 0–0.03)
IMM GRANULOCYTES NFR BLD: 0.3 % (ref 0–0.6)
LYMPHOCYTES # BLD AUTO: 1.35 10*3/MM3 (ref 0.6–4.8)
LYMPHOCYTES NFR BLD AUTO: 18.3 % (ref 24–44)
MCH RBC QN AUTO: 29.4 PG (ref 27–31)
MCHC RBC AUTO-ENTMCNC: 33 G/DL (ref 32–36)
MCV RBC AUTO: 89.1 FL (ref 80–99)
MONOCYTES # BLD AUTO: 0.83 10*3/MM3 (ref 0–1)
MONOCYTES NFR BLD AUTO: 11.3 % (ref 0–12)
NEUTROPHILS # BLD AUTO: 5.15 10*3/MM3 (ref 1.5–8.3)
NEUTROPHILS NFR BLD AUTO: 69.9 % (ref 41–71)
PLATELET # BLD AUTO: 173 10*3/MM3 (ref 150–450)
PMV BLD AUTO: 10.5 FL (ref 6–12)
POTASSIUM BLD-SCNC: 4.3 MMOL/L (ref 3.5–5.5)
PROT SERPL-MCNC: 4.8 G/DL (ref 5.7–8.2)
RBC # BLD AUTO: 3.77 10*6/MM3 (ref 4.2–5.76)
SODIUM BLD-SCNC: 137 MMOL/L (ref 132–146)
WBC NRBC COR # BLD: 7.37 10*3/MM3 (ref 3.5–10.8)

## 2018-10-02 PROCEDURE — 94799 UNLISTED PULMONARY SVC/PX: CPT

## 2018-10-02 PROCEDURE — 25010000002 MEROPENEM: Performed by: INTERNAL MEDICINE

## 2018-10-02 PROCEDURE — 25010000002 ONDANSETRON PER 1 MG: Performed by: INTERNAL MEDICINE

## 2018-10-02 PROCEDURE — 99233 SBSQ HOSP IP/OBS HIGH 50: CPT | Performed by: INTERNAL MEDICINE

## 2018-10-02 PROCEDURE — 25010000002 HEPARIN (PORCINE) PER 1000 UNITS: Performed by: INTERNAL MEDICINE

## 2018-10-02 PROCEDURE — 80053 COMPREHEN METABOLIC PANEL: CPT | Performed by: INTERNAL MEDICINE

## 2018-10-02 PROCEDURE — 82962 GLUCOSE BLOOD TEST: CPT

## 2018-10-02 PROCEDURE — 99232 SBSQ HOSP IP/OBS MODERATE 35: CPT | Performed by: NURSE PRACTITIONER

## 2018-10-02 PROCEDURE — 94760 N-INVAS EAR/PLS OXIMETRY 1: CPT

## 2018-10-02 PROCEDURE — 85025 COMPLETE CBC W/AUTO DIFF WBC: CPT | Performed by: INTERNAL MEDICINE

## 2018-10-02 PROCEDURE — 94640 AIRWAY INHALATION TREATMENT: CPT

## 2018-10-02 RX ADMIN — MORPHINE SULFATE 30 MG: 30 TABLET, EXTENDED RELEASE ORAL at 09:57

## 2018-10-02 RX ADMIN — ONDANSETRON 4 MG: 2 INJECTION INTRAMUSCULAR; INTRAVENOUS at 21:07

## 2018-10-02 RX ADMIN — SODIUM CHLORIDE 150 ML/HR: 900 INJECTION INTRAVENOUS at 18:49

## 2018-10-02 RX ADMIN — MEROPENEM 1 G: 1 INJECTION, POWDER, FOR SOLUTION INTRAVENOUS at 04:47

## 2018-10-02 RX ADMIN — MEROPENEM 1 G: 1 INJECTION, POWDER, FOR SOLUTION INTRAVENOUS at 12:22

## 2018-10-02 RX ADMIN — FLECAINIDE ACETATE 50 MG: 50 TABLET ORAL at 09:58

## 2018-10-02 RX ADMIN — MEROPENEM 1 G: 1 INJECTION, POWDER, FOR SOLUTION INTRAVENOUS at 21:18

## 2018-10-02 RX ADMIN — METOPROLOL TARTRATE 25 MG: 25 TABLET ORAL at 10:02

## 2018-10-02 RX ADMIN — MORPHINE SULFATE 30 MG: 30 TABLET, EXTENDED RELEASE ORAL at 21:09

## 2018-10-02 RX ADMIN — HEPARIN SODIUM 5000 UNITS: 5000 INJECTION, SOLUTION INTRAVENOUS; SUBCUTANEOUS at 22:17

## 2018-10-02 RX ADMIN — FLECAINIDE ACETATE 50 MG: 50 TABLET ORAL at 21:09

## 2018-10-02 RX ADMIN — BUDESONIDE 0.25 MG: 0.5 INHALANT RESPIRATORY (INHALATION) at 09:37

## 2018-10-02 RX ADMIN — FLUOXETINE HYDROCHLORIDE 40 MG: 20 CAPSULE ORAL at 09:57

## 2018-10-02 RX ADMIN — HEPARIN SODIUM 5000 UNITS: 5000 INJECTION, SOLUTION INTRAVENOUS; SUBCUTANEOUS at 05:32

## 2018-10-02 RX ADMIN — SODIUM CHLORIDE 100 ML/HR: 900 INJECTION INTRAVENOUS at 10:06

## 2018-10-02 RX ADMIN — HEPARIN SODIUM 5000 UNITS: 5000 INJECTION, SOLUTION INTRAVENOUS; SUBCUTANEOUS at 16:02

## 2018-10-02 RX ADMIN — PANTOPRAZOLE SODIUM 40 MG: 40 TABLET, DELAYED RELEASE ORAL at 05:32

## 2018-10-02 RX ADMIN — COLCHICINE 0.6 MG: 0.6 TABLET, FILM COATED ORAL at 09:57

## 2018-10-02 RX ADMIN — SODIUM CHLORIDE 100 ML/HR: 900 INJECTION INTRAVENOUS at 04:46

## 2018-10-02 RX ADMIN — BUDESONIDE 0.5 MG: 0.5 INHALANT RESPIRATORY (INHALATION) at 19:40

## 2018-10-02 NOTE — PROGRESS NOTES
Discharge Planning Assessment  [facility]     Patient Name: Todd Phillips  MRN: [id number]  Today's Date: 10/2/2018    Admit Date: 10/1/2018          Discharge Needs Assessment     Row Name 10/02/18 1442       Living Environment    Lives With spouse    Name(s) of Who Lives With Patient Emmy Phillips    Current Living Arrangements home/apartment/condo    Primary Care Provided by self    Provides Primary Care For no one    Family Caregiver if Needed spouse    Family Caregiver Names Emmy Phillips    Quality of Family Relationships helpful;involved;supportive    Able to Return to Prior Arrangements yes       Resource/Environmental Concerns    Resource/Environmental Concerns none       Transition Planning    Patient/Family Anticipates Transition to home;home with family    Patient/Family Anticipated Services at Transition none    Transportation Anticipated family or friend will provide       Discharge Needs Assessment    Readmission Within the Last 30 Days no previous admission in last 30 days    Concerns to be Addressed no discharge needs identified;denies needs/concerns at this time    Equipment Currently Used at Home shower chair    Anticipated Changes Related to Illness none    Equipment Needed After Discharge none    Discharge Coordination/Progress Home            Discharge Plan     Row Name 10/02/18 1444       Plan    Plan Home    Patient/Family in Agreement with Plan yes    Plan Comments Spoke with patient and wife at bedside regarding discharge planning.  Patient denies the use of HH and has a Shower Chair.  [facility] reports that [facility] most [facility] and his insurance picks up the rest of most medications not covered through the [facility].  Patient lives with his wife in a single level home with finished basement and 2 steps to access but patient reports no concerns regarding home safety.  No discharge needs verbalized at this time.  CM following.  Patient plan is to discharge home via car with family  to transport when medically ready.     Final Discharge Disposition Code 01 - home or self-care        Destination     No service coordination in this encounter.      Durable Medical Equipment     No service coordination in this encounter.      Dialysis/Infusion     No service coordination in this encounter.      Home Medical Care     No service coordination in this encounter.      Social Care     No service coordination in this encounter.        Expected Discharge Date and Time     Expected Discharge Date Expected Discharge Time    Oct 5, 2018               Demographic Summary     Row Name 10/02/18 1441       General Information    Admission Type inpatient    Arrived From home    Referral Source admission list    Reason for Consult discharge planning    Preferred Language English     Used During This Interaction no    General Information Comments Adiel Valdez MD       Contact Information    Permission Granted to Share Info With     Contact Information Obtained for     Contact Information Comments Emmy Phillips, spouse  [phone]            Functional Status     Row Name 10/02/18 1442       Functional Status    Usual Activity Tolerance good    Current Activity Tolerance moderate       Functional Status, IADL    Medications independent    Meal Preparation independent    Housekeeping independent    Laundry independent    Shopping independent       Employment/    Employment/ Comments Medicare/[employer]            Psychosocial    No documentation.           Abuse/Neglect    No documentation.           Legal    No documentation.           Substance Abuse    No documentation.           Patient Forms    No documentation.         Aisha Quispe RN

## 2018-10-02 NOTE — PROGRESS NOTES
[facility] Progress Note        Antibiotics:  Anti-Infectives     Ordered     Dose/Rate Route Frequency Start Stop    10/01/18 0420  meropenem (MERREM) 1 g/100 mL 0.9% NS VTB (mbp)     Ordering Provider:  Milana Villeda, DO    1 g  over 3 Hours Intravenous Every 8 Hours 10/01/18 1200 10/08/18 1159    10/01/18 0420  meropenem (MERREM) 1 g/100 mL 0.9% NS VTB (mbp)     Ordering Provider:  Milana Villeda, DO    1 g  over 30 Minutes Intravenous Once 10/01/18 0515 10/01/18 0549          CC: abd pain    HPI:  Patient is a 68 y.o.  Yr old male with history of biliary tract obstruction, admitted to [facility] multiple times in the last 2 months including August 10 until August 16, underwent cholecystectomy with pathology suggesting chronic cholecystitis, had ERCP on August 14 with biliary stricture/proximal duct dilation and had stent placement.  He was readmitted August 21, 2018 until August 25, 2018 with acute sepsis, Klebsiella bacteremia, discharged with oral antibiotics.  Readmitted September 12, 2018 until September 17, 2018 with ESBL Escherichia coli bacteremia seen by infectious disease in [place] and treated with Invanz, duration of therapy unclear but approximately until September 24.  During that period of time, concern for malignancy mentioned in notes and referred to [facility] for further biopsy, done September 28.  Presented once again to [facility] emergency room September 30 with acute onset abdominal pain/nausea/vomiting, delirium and fever to 103/ tachy to 115.  Blood cultures positive again with initial PCR Klebsiella species and no carbopenem resistance per micro there by PCR.  Transferred to [facility].    10/1/18 ERCP with evidence for obstruction/purulence and new stent placed; diarrhea develops and C. difficile PCR ordered     10/2/18 He reports less intense abdominal pain, diffuse, nonradiating, worse with palpation, better with pain meds, 4 out of 10 and  "associated with jaundice.  Recent nausea/vomiting but not today.   diarrhea less but he is unable to quantify.  No hematochezia melena or hematemesis.     No headache photophobia or neck stiffness.  No shortness of breath cough or hemoptysis.  No dysuria hematuria or pyuria.  No other new skin rash or exposure.    ROS:      10/2/18 No f/c/s. No n/v. No rash. No new ADR to Abx.     Constitutional-- No Fever, chills or sweats.  Appetite diminished with generalized malaise and fatigue  Heent-- No new vision, hearing or throat complaints.  No epistaxis or oral sores.  Denies odynophagia or dysphagia.  No flashers, floaters or eye pain. No odynophagia or dysphagia. No headache, photophobia or neck stiffness.  CV-- No chest pain, palpitation or syncope  Resp-- No SOB/cough/Hemoptysis  GI- No nausea, vomiting.  No hematochezia, melena, or hematemesis. Denies jaundice or chronic liver disease.  -- No dysuria, hematuria, or flank pain.  Denies hesitancy, urgency or flank pain.  Lymph- no swollen lymph nodes in neck/axilla or groin.   Heme- No active bruising or bleeding; no Hx of DVT or PE.  MS-- no swelling or pain in the bones or joints of arms/legs.  No new back pain.  Neuro-- No acute focal weakness or numbness in the arms or legs.  No seizures.    Full 12 point review of systems reviewed and negative otherwise for acute complaints, aside from above      PE:   /77   Pulse 100   Temp 98.1 °F (36.7 °C) (Oral)   Resp 18   Ht 182.9 cm (72\")   Wt 99.8 kg (220 lb)   SpO2 97%   BMI 29.84 kg/m²     GENERAL: Awake and alert, in no acute distress. Nc O2  HEENT: Normocephalic, atraumatic.  PERRL. EOMI. No conjunctival injection. +icterus. Oropharynx clear without evidence of thrush or exudate. No evidence of peridontal disease.    NECK: Supple without nuchal rigidity. No mass.  LYMPH: No cervical, axillary or inguinal lymphadenopathy.  HEART: RRR; No murmur, rubs, gallops.   LUNGS: Clear to auscultation bilaterally " without wheezing, rales, rhonchi. Normal respiratory effort. Nonlabored. No dullness.  ABDOMEN: Soft, tender, nondistended. Positive bowel sounds. No rebound or guarding. NO mass or HSM.  EXT:  No cyanosis, clubbing or edema. No cord.  : Genitalia generally unremarkable.  Without Hassan catheter.  MSK: FROM without joint effusions noted arms/legs.    SKIN: Warm and dry without cutaneous eruptions on Inspection/palpation.    NEURO: Oriented to PPT. No focal deficits on motor/sensory exam at arms/legs.  PSYCHIATRIC: Normal insight and judgement. Cooperative with PE     No peripheral stigmata/phenomena of endocarditis    Laboratory Data      Results from last 7 days  Lab Units 10/02/18  0552 10/01/18  0459 09/30/18  1709   WBC 10*3/mm3 7.37 10.17 5.57   HEMOGLOBIN g/dL 11.1* 11.2* 12.5*   HEMATOCRIT % 33.6* 33.6* 37.3*   PLATELETS 10*3/mm3 173 190 224       Results from last 7 days  Lab Units 10/02/18  0552   SODIUM mmol/L 137   POTASSIUM mmol/L 4.3   CHLORIDE mmol/L 104   CO2 mmol/L 26.0   BUN mg/dL 26*   CREATININE mg/dL 1.33*   GLUCOSE mg/dL 98   CALCIUM mg/dL 8.4*       Results from last 7 days  Lab Units 10/02/18  0552   ALK PHOS U/L 291*   BILIRUBIN mg/dL 4.9*   ALT (SGPT) U/L 176*   AST (SGOT) U/L 170*           Results from last 7 days  Lab Units 09/25/18  1439   CRP mg/dL <0.50       Estimated Creatinine Clearance: 63.2 mL/min (A) (by C-G formula based on SCr of 1.33 mg/dL (H)).      Microbiology:      Radiology:  Imaging Results (last 72 hours)     Procedure Component Value Units Date/Time    FL ERCP pancreatic and biliary ducts [[id number]] Collected:  10/02/18 0947     Updated:  10/02/18 1029    Narrative:       EXAMINATION: FLUOROSCOPY ERCP PANCREATIC AND BILIARY DUCTS-      INDICATION: ERCP ; A41.9-Sepsis, unspecified organism; R50.9-Fever,  unspecified; K83.1-Obstruction of bile duct.      COMPARISON: None.     FINDINGS: Intraoperative fluoroscopy during ERCP with stent placement.  Total fluoroscopic  time usage 3 minutes 15 seconds and five  representative images saved.        Impression:       Intraoperative fluoroscopy utilized during ERCP with stent  placement. Please see ERCP procedure report for full details.     D:  10/02/2018  E:  10/02/2018     This report was finalized on 10/2/2018 10:27 AM by Dr. Luis A Oneal.               Impression:     --Acute severe sepsis, febrile/tachycardic with confusion and acute kidney injury at presentation to [facility] September 30.  Sepsis parameters improved with better hemodynamics, fever decreased and creatinine improved.  Intra-abdominal source is primary concern.  Broad-spectrum antimicrobials until further data.  Gram-negative kassie in blood culture at [facility]     --Acute gram-negative kassie septicemia.  History as outlined above with Klebsiella in August, ESBL Escherichia coli in September and gram-negative kassie on this admission with preliminary PCR Klebsiella and no carbopenem resistance per micro at [place] (I d/w them directly)     --Acute/recurrent cholangitis with prior cholecystectomy, pathology showing chronic cholecystitis requiring postoperative ERCP and obstruction noted, status post stent in August and abnormal liver function tests, cholestasis and abdominal pain with jaundice and possible malignancy.   had ERCP/new stent October 1 with evidence for purulence/obstruction.   Surgery/GI to help define option timing and threshold for further surgery/intervention.  Pathology pending regarding recent biopsies.  Patient understands high risk for further infection, potential progressive sepsis and other serious sequela/mortality etc.      --Acute kidney injury.  Creatinine trending down with resuscitation.  Likely prerenal associated with sepsis     --Acute encephalopathy, improved per family and likely metabolic associated with sepsis     --Chronic kidney disease stage III per records.  Monitor to help guide further adjustments in  antimicrobials     --COPD per notes     --Diabetes type 2.    PLAN:     --IV Merrem     --I discussed potential risks and benefits of the prescribed antibiotics that include, but are not limited to, solid organ toxicity, neuro toxicity, renal toxicity, CDiff, cytopenias, hypersensitivity,etc.. Patient/Family voice understanding and agree to proceed.     --Check/review labs cultures and scans      --History per nursing and family     --Discussed with microbiology    --d/w Dr Jimenez     --Highly complex set of issues with high risk for further serious morbidity and other serious sequela    Scot Higgins MD  10/2/2018

## 2018-10-02 NOTE — PROGRESS NOTES
"GI Daily Progress Note 10/02/18  N644/1  JOSE YA is a 68 y.o. male who is admitted on 10/1/2018 for Sepsis (CMS/[facility]) [A41.9]  Sepsis (CMS/[facility]) [A41.9].  Subjective   Chief Complaint:  I'm feeling a lot better today  Patient is doing much better after ERCP yesterday  Abdominal pain, rating 1 on 0-10 scale, compared to 10 yesterday  Tolerating regular diet without nausea and vomiting  Last bowel movement yesterday morning.  No bloody stool    Diet Order   Procedures   • Diet Regular; Low Fat     ROS:  Objective   /77   Pulse 80   Temp 97.7 °F (36.5 °C) (Oral)   Resp 18   Ht 182.9 cm (72\")   Wt 99.8 kg (220 lb)   SpO2 97%   BMI 29.84 kg/m²   Physical Exam   General Well developed; well nourished; no acute distress.   Eyes Conjunctiva pink, non-icterus  ENT Good dentition.  Oral mucosa pink & moist without thrush or lesions.    Resp Respiration effort normal  CV No lower extremity edema  GI Abd soft, NT, ND, normal active bowel sounds.  No HSM.  No abd hernia  Skin No rash; no lesions; no bruises.    Psych Oriented to time, place, and person.    Lab    Results from last 7 days  Lab Units 10/02/18  0552 10/01/18  0459 09/30/18  1709 09/25/18  1439   HEMOGLOBIN g/dL 11.1* 11.2* 12.5* 13.7*   HEMATOCRIT % 33.6* 33.6* 37.3* 40.4*       Results from last 7 days  Lab Units 10/02/18  0552 10/01/18  0459 09/30/18  1709 09/30/18  1655 09/25/18  1439   WBC 10*3/mm3 7.37 10.17 5.57  --  5.68   MCV fL 89.1 88.7 88.6  --  88.2   PLATELETS 10*3/mm3 173 190 224  --  236   BUN / CREAT RATIO  19.5 13.9 16.0  --  14.6   INR   --   --  0.99  --   --    CRP mg/dL  --   --   --   --  <0.50   LACTATE mmol/L  --  1.1  --  1.4  --    PROCALCITONIN ng/mL  --  17.63*  --   --   --    ANION GAP mmol/L 7.0 6.0 11.2  --  8.1       Results from last 7 days  Lab Units 10/02/18  0552 10/01/18  0459 09/30/18  1709 09/25/18  1439   AST (SGOT) U/L 170* 190* 235* 319*   ALT (SGPT) U/L 176* 202* 225* 399*   ALK PHOS U/L 291* 352* 475* " 474*   BILIRUBIN mg/dL 4.9* 4.4* 3.2* 1.1   ALBUMIN g/dL 2.94* 3.30 4.00 4.30   LIPASE U/L  --   --  24  --    AMYLASE U/L  --   --  52  --    BUN mg/dL 26* 17 21 24*   CREATININE mg/dL 1.33* 1.22 1.31* 1.64*   EGFR IF NONAFRICN AM mL/min/1.73 53* 59* 54* 42*   SODIUM mmol/L 137 135 137 137   POTASSIUM mmol/L 4.3 3.9 4.1 4.1   MAGNESIUM mg/dL  --   --  1.5*  --    CO2 mmol/L 26.0 24.0 21.8* 25.9   GLUCOSE mg/dL 98 167* 192* 126*     Scheduled Meds  budesonide 0.25 mg Nebulization BID - RT   colchicine 0.6 mg Oral Daily   flecainide 50 mg Oral Q12H   FLUoxetine 40 mg Oral Daily   heparin (porcine) 5,000 Units Subcutaneous Q8H   insulin lispro 0-7 Units Subcutaneous 4x Daily With Meals & Nightly   meropenem 1 g Intravenous Q8H   metoprolol tartrate 25 mg Oral Daily   Morphine 30 mg Oral Q12H   pantoprazole 40 mg Oral Q AM     Infusions  lactated ringers 9 mL/hr    sodium chloride 100 mL/hr Last Rate: 100 mL/hr (10/02/18 1006)     PRN Meds•  albuterol  •  dextrose  •  dextrose  •  glucagon (human recombinant)  •  HYDROmorphone  •  ondansetron  •  sodium chloride  Assessment/Plan   Recurrent Sepsis w/ recurrent gram-negtivebacteremia. Has multiple recent procedures and surgery.    Cholangitis status post CBD stent exchange with ERCP on 10/1/18 by Dr. Luna   Recent ccy 8/10/18  LFT elevation r/t to recent biliary obstruction.  LFT trending downward.  Bilirubin remains stable.  Expected to come down tomorrow  · Supportive care and abx per ID    JOVANNA d/t sepsis, dehydration, and abx  New onset of diarrhea (non-bloody).  Yesterday. No BM since  · abx per ID  · Stool for CDiff     Pancreatic mass. Elevated CA 19-9 up to 7600. Recent EUS 9/28/18. Concerned for malignancy  · Follow up on path  Principal Problem:    Sepsis (CMS/HCC)  Active Problems:    Hyperlipidemia    COPD (chronic obstructive pulmonary disease) (CMS/HCC)    Essential hypertension    Nonobstructive atherosclerosis of coronary artery    CKD stage 3 secondary  to diabetes (CMS/HCC)    DM2 (diabetes mellitus, type 2) (CMS/HCC)    Paroxysmal atrial fibrillation    Malignant neoplasm of head of pancreas (CMS/HCC)    Sepsis (CMS/HCC)    Fever    Abdominal pain    Nausea vomiting and diarrhea    Biliary obstruction          Marilu CELIS    For further questions, pls contact [phone] or [email or web address]

## 2018-10-02 NOTE — PLAN OF CARE
Problem: Sepsis/Septic Shock (Adult)  Goal: Signs and Symptoms of Listed Potential Problems Will be Absent, Minimized or Managed (Sepsis/Septic Shock)  Outcome: Ongoing (interventions implemented as appropriate)      Problem: Patient Care Overview  Goal: Plan of Care Review  Outcome: Ongoing (interventions implemented as appropriate)   10/02/18 3106   Coping/Psychosocial   Plan of Care Reviewed With patient;spouse   Plan of Care Review   Progress improving   OTHER   Outcome Summary Pt rested well overnight with no complaints of pain (pain 1/10 on pain scale). Continue current POC; hopefully home soon

## 2018-10-02 NOTE — PROGRESS NOTES
[facility] Medicine Services  PROGRESS NOTE    Patient Name: Todd Phillips  : 1950  MRN: [id number]    Date of Admission: 10/1/2018  Length of Stay: 1  Primary Care Physician: Adiel Denney MD    Subjective   Subjective     CC: f/u cholangitis    HPI: Up in bed. Pain much better. Feels much stronger. No new complaints.     Review of Systems  Gen- No fevers, chills  CV- No chest pain, palpitations  Resp- No cough, dyspnea  GI- No N/V/D, abd pain    Otherwise ROS is negative except as mentioned in the HPI.    Objective   Objective     Vital Signs:   Temp:  [97 °F (36.1 °C)-99.2 °F (37.3 °C)] 97.7 °F (36.5 °C)  Heart Rate:  [] 80  Resp:  [16-20] 18  BP: (105-125)/(55-77) 125/77        Physical Exam:  Constitutional: No acute distress, awake, alert, looks better  HENT: NCAT, mucous membranes moist  Respiratory: Clear to auscultation bilaterally, respiratory effort normal   Cardiovascular: RRR, no murmurs, rubs, or gallops, palpable pedal pulses bilaterally  Gastrointestinal: Positive bowel sounds, soft, ND, improved TTP  Musculoskeletal: No bilateral ankle edema  Psychiatric: Appropriate affect, cooperative  Neurologic: Oriented x 3, strength symmetric in all extremities, Cranial Nerves grossly intact to confrontation, speech clear  Skin: No rashes    Results Reviewed:  I have personally reviewed current lab, radiology, and data and agree.      Results from last 7 days  Lab Units 10/02/18  0552 10/01/18  0459 09/30/18  1709   WBC 10*3/mm3 7.37 10.17 5.57   HEMOGLOBIN g/dL 11.1* 11.2* 12.5*   HEMATOCRIT % 33.6* 33.6* 37.3*   PLATELETS 10*3/mm3 173 190 224   INR   --   --  0.99       Results from last 7 days  Lab Units 10/02/18  0552 10/01/18  0459 09/30/18  1910 18  1709   SODIUM mmol/L 137 135  --  137   POTASSIUM mmol/L 4.3 3.9  --  4.1   CHLORIDE mmol/L 104 105  --  104   CO2 mmol/L 26.0 24.0  --  21.8*   BUN mg/dL 26* 17  --  21   CREATININE mg/dL 1.33* 1.22  --   1.31*   GLUCOSE mg/dL 98 167*  --  192*   CALCIUM mg/dL 8.4* 8.3*  --  9.5   ALT (SGPT) U/L 176* 202*  --  225*   AST (SGOT) U/L 170* 190*  --  235*   TROPONIN I ng/mL  --   --  0.007 <0.006     Estimated Creatinine Clearance: 63.2 mL/min (A) (by C-G formula based on SCr of 1.33 mg/dL (H)).  No results found for: BNP    Microbiology Results Abnormal     Procedure Component Value - Date/Time    Blood Culture - Blood, [[id number]]  (Normal) Collected:  10/01/18 0459    Lab Status:  Preliminary result Specimen:  Blood from Arm, Left Updated:  10/02/18 0530     Blood Culture No growth at 24 hours    Blood Culture - Blood, [[id number]]  (Normal) Collected:  10/01/18 0459    Lab Status:  Preliminary result Specimen:  Blood from Arm, Right Updated:  10/02/18 0530     Blood Culture No growth at 24 hours          Imaging Results (last 24 hours)     Procedure Component Value Units Date/Time    FL ERCP pancreatic and biliary ducts [[id number]] Collected:  10/02/18 0947     Updated:  10/02/18 1029    Narrative:       EXAMINATION: FLUOROSCOPY ERCP PANCREATIC AND BILIARY DUCTS-      INDICATION: ERCP ; A41.9-Sepsis, unspecified organism; R50.9-Fever,  unspecified; K83.1-Obstruction of bile duct.      COMPARISON: None.     FINDINGS: Intraoperative fluoroscopy during ERCP with stent placement.  Total fluoroscopic time usage 3 minutes 15 seconds and five  representative images saved.        Impression:       Intraoperative fluoroscopy utilized during ERCP with stent  placement. Please see ERCP procedure report for full details.     D:  10/02/2018  E:  10/02/2018     This report was finalized on 10/2/2018 10:27 AM by Dr. Luis A Oneal.           Results for orders placed during the hospital encounter of 10/22/17   Adult Transthoracic Echo Complete W/ Cont if Necessary Per Protocol    Narrative · Left ventricular systolic function is normal. Estimated EF = 60%.  · The cardiac valves are anatomically and functionally normal.          I  have reviewed the medications.      budesonide 0.25 mg Nebulization BID - RT   colchicine 0.6 mg Oral Daily   flecainide 50 mg Oral Q12H   FLUoxetine 40 mg Oral Daily   heparin (porcine) 5,000 Units Subcutaneous Q8H   insulin lispro 0-7 Units Subcutaneous 4x Daily With Meals & Nightly   meropenem 1 g Intravenous Q8H   metoprolol tartrate 25 mg Oral Daily   Morphine 30 mg Oral Q12H   pantoprazole 40 mg Oral Q AM         Assessment/Plan   Assessment / Plan     Hospital Problem List     * (Principal)Sepsis (CMS/[facility])    Hyperlipidemia    COPD (chronic obstructive pulmonary disease) (CMS/[facility])    Essential hypertension    Nonobstructive atherosclerosis of coronary artery    Overview Signed 10/22/2017 12:37 PM by Jono Cruz MD     [facility] 2014 dr. Reyes: 20% dz LAD, mild plaque         CKD stage 3 secondary to diabetes (CMS/[facility])    Overview Signed 10/22/2017 12:37 PM by Jono Cruz MD     Baseline creatinine 1.4-1.5         DM2 (diabetes mellitus, type 2) (CMS/[facility])    Paroxysmal atrial fibrillation    Malignant neoplasm of head of pancreas (CMS/[facility])    Overview Signed 9/5/2018  9:58 AM by Olya Vaughn     Added automatically from request for surgery [id number]         Sepsis (CMS/[facility])    Fever    Abdominal pain    Nausea vomiting and diarrhea    Biliary obstruction    Overview Signed 10/1/2018  1:55 PM by Eb Jimenez MD     Added automatically from request for surgery [id number]               Brief Hospital Course to date:  Todd Phillips is a 68 y.o. male with likely pancreatic cancer presenting from home with sepsis due to cholangitis from occluded biliary stent. He underwent stent placement by Dr. Luna on 10/1.    Assessment & Plan:  --I have d/w Dr. Luna. Patient stent occluded due to mass effect. Metal stent placed with expression of purulent material. His pain and LFTs are improving.   --ID following. Continue IV abx per them.   --Pathology pending from recent biopsy. Concerning for malignancy.  Will follow up with Dr. Brody.  --DM2 currently controlled. Continue SSI.  --PT/OT.  --Labs in am.    DVT Prophylaxis: [facility]    CODE STATUS:   Code Status and Medical Interventions:   Ordered at: 10/01/18 0420     Level Of Support Discussed With:    Patient     Code Status:    CPR     Medical Interventions (Level of Support Prior to Arrest):    Full       Disposition: I expect the patient to be discharged home with IV abx in 1-2 days.      Electronically signed by Tamara Cooley II, DO, 10/02/18, 2:28 PM.

## 2018-10-03 LAB
ALBUMIN SERPL-MCNC: 2.89 G/DL (ref 3.2–4.8)
ALBUMIN/GLOB SERPL: 1.5 G/DL (ref 1.5–2.5)
ALP SERPL-CCNC: 304 U/L (ref 25–100)
ALT SERPL W P-5'-P-CCNC: 142 U/L (ref 7–40)
ANION GAP SERPL CALCULATED.3IONS-SCNC: 6 MMOL/L (ref 3–11)
AST SERPL-CCNC: 114 U/L (ref 0–33)
BACTERIA SPEC AEROBE CULT: ABNORMAL
BACTERIA SPEC AEROBE CULT: ABNORMAL
BILIRUB SERPL-MCNC: 2.3 MG/DL (ref 0.3–1.2)
BUN BLD-MCNC: 23 MG/DL (ref 9–23)
BUN/CREAT SERPL: 22.3 (ref 7–25)
CALCIUM SPEC-SCNC: 8 MG/DL (ref 8.7–10.4)
CHLORIDE SERPL-SCNC: 108 MMOL/L (ref 99–109)
CO2 SERPL-SCNC: 24 MMOL/L (ref 20–31)
CREAT BLD-MCNC: 1.03 MG/DL (ref 0.6–1.3)
DEPRECATED RDW RBC AUTO: 45.9 FL (ref 37–54)
ERYTHROCYTE [DISTWIDTH] IN BLOOD BY AUTOMATED COUNT: 14.3 % (ref 11.3–14.5)
GFR SERPL CREATININE-BSD FRML MDRD: 71 ML/MIN/1.73
GLOBULIN UR ELPH-MCNC: 1.9 GM/DL
GLUCOSE BLD-MCNC: 88 MG/DL (ref 70–100)
GLUCOSE BLDC GLUCOMTR-MCNC: 110 MG/DL (ref 70–130)
GLUCOSE BLDC GLUCOMTR-MCNC: 113 MG/DL (ref 70–130)
GLUCOSE BLDC GLUCOMTR-MCNC: 120 MG/DL (ref 70–130)
GLUCOSE BLDC GLUCOMTR-MCNC: 91 MG/DL (ref 70–130)
GRAM STN SPEC: ABNORMAL
GRAM STN SPEC: ABNORMAL
HCT VFR BLD AUTO: 31.8 % (ref 38.9–50.9)
HGB BLD-MCNC: 10.6 G/DL (ref 13.1–17.5)
ISOLATED FROM: ABNORMAL
ISOLATED FROM: ABNORMAL
LAB AP CASE REPORT: NORMAL
LAB AP DIAGNOSIS COMMENT: NORMAL
MCH RBC QN AUTO: 29 PG (ref 27–31)
MCHC RBC AUTO-ENTMCNC: 33.3 G/DL (ref 32–36)
MCV RBC AUTO: 87.1 FL (ref 80–99)
PATH REPORT.FINAL DX SPEC: NORMAL
PLATELET # BLD AUTO: 189 10*3/MM3 (ref 150–450)
PMV BLD AUTO: 10.2 FL (ref 6–12)
POTASSIUM BLD-SCNC: 3.9 MMOL/L (ref 3.5–5.5)
PROT SERPL-MCNC: 4.8 G/DL (ref 5.7–8.2)
RBC # BLD AUTO: 3.65 10*6/MM3 (ref 4.2–5.76)
SODIUM BLD-SCNC: 138 MMOL/L (ref 132–146)
WBC NRBC COR # BLD: 5.28 10*3/MM3 (ref 3.5–10.8)

## 2018-10-03 PROCEDURE — 94799 UNLISTED PULMONARY SVC/PX: CPT

## 2018-10-03 PROCEDURE — 97165 OT EVAL LOW COMPLEX 30 MIN: CPT

## 2018-10-03 PROCEDURE — 25010000002 ONDANSETRON PER 1 MG: Performed by: INTERNAL MEDICINE

## 2018-10-03 PROCEDURE — 99233 SBSQ HOSP IP/OBS HIGH 50: CPT | Performed by: INTERNAL MEDICINE

## 2018-10-03 PROCEDURE — 99232 SBSQ HOSP IP/OBS MODERATE 35: CPT | Performed by: INTERNAL MEDICINE

## 2018-10-03 PROCEDURE — 25010000002 HEPARIN (PORCINE) PER 1000 UNITS: Performed by: INTERNAL MEDICINE

## 2018-10-03 PROCEDURE — 25010000002 MEROPENEM: Performed by: INTERNAL MEDICINE

## 2018-10-03 PROCEDURE — 94640 AIRWAY INHALATION TREATMENT: CPT

## 2018-10-03 PROCEDURE — 85027 COMPLETE CBC AUTOMATED: CPT | Performed by: INTERNAL MEDICINE

## 2018-10-03 PROCEDURE — 82962 GLUCOSE BLOOD TEST: CPT

## 2018-10-03 PROCEDURE — 80053 COMPREHEN METABOLIC PANEL: CPT | Performed by: INTERNAL MEDICINE

## 2018-10-03 PROCEDURE — 25010000002 HYDROMORPHONE PER 4 MG: Performed by: INTERNAL MEDICINE

## 2018-10-03 PROCEDURE — 25010000002 PROMETHAZINE PER 50 MG: Performed by: INTERNAL MEDICINE

## 2018-10-03 PROCEDURE — 94760 N-INVAS EAR/PLS OXIMETRY 1: CPT

## 2018-10-03 PROCEDURE — 97162 PT EVAL MOD COMPLEX 30 MIN: CPT

## 2018-10-03 RX ORDER — PROMETHAZINE HYDROCHLORIDE 25 MG/ML
12.5 INJECTION, SOLUTION INTRAMUSCULAR; INTRAVENOUS EVERY 6 HOURS PRN
Status: DISCONTINUED | OUTPATIENT
Start: 2018-10-03 | End: 2018-10-04 | Stop reason: HOSPADM

## 2018-10-03 RX ADMIN — HEPARIN SODIUM 5000 UNITS: 5000 INJECTION, SOLUTION INTRAVENOUS; SUBCUTANEOUS at 06:10

## 2018-10-03 RX ADMIN — ALBUTEROL SULFATE 2.5 MG: 2.5 SOLUTION RESPIRATORY (INHALATION) at 16:07

## 2018-10-03 RX ADMIN — MORPHINE SULFATE 30 MG: 30 TABLET, EXTENDED RELEASE ORAL at 08:34

## 2018-10-03 RX ADMIN — ALBUTEROL SULFATE 2.5 MG: 2.5 SOLUTION RESPIRATORY (INHALATION) at 08:43

## 2018-10-03 RX ADMIN — BUDESONIDE 0.5 MG: 0.5 INHALANT RESPIRATORY (INHALATION) at 19:33

## 2018-10-03 RX ADMIN — ONDANSETRON 4 MG: 2 INJECTION INTRAMUSCULAR; INTRAVENOUS at 10:11

## 2018-10-03 RX ADMIN — SODIUM CHLORIDE 150 ML/HR: 900 INJECTION INTRAVENOUS at 01:25

## 2018-10-03 RX ADMIN — PROMETHAZINE HYDROCHLORIDE 12.5 MG: 25 INJECTION INTRAMUSCULAR; INTRAVENOUS at 13:49

## 2018-10-03 RX ADMIN — SODIUM CHLORIDE 150 ML/HR: 900 INJECTION INTRAVENOUS at 20:24

## 2018-10-03 RX ADMIN — MORPHINE SULFATE 30 MG: 30 TABLET, EXTENDED RELEASE ORAL at 20:41

## 2018-10-03 RX ADMIN — PANTOPRAZOLE SODIUM 40 MG: 40 TABLET, DELAYED RELEASE ORAL at 06:10

## 2018-10-03 RX ADMIN — MEROPENEM 1 G: 1 INJECTION, POWDER, FOR SOLUTION INTRAVENOUS at 13:49

## 2018-10-03 RX ADMIN — MEROPENEM 1 G: 1 INJECTION, POWDER, FOR SOLUTION INTRAVENOUS at 20:40

## 2018-10-03 RX ADMIN — ONDANSETRON 4 MG: 2 INJECTION INTRAMUSCULAR; INTRAVENOUS at 03:37

## 2018-10-03 RX ADMIN — SODIUM CHLORIDE 150 ML/HR: 900 INJECTION INTRAVENOUS at 08:19

## 2018-10-03 RX ADMIN — HYDROMORPHONE HYDROCHLORIDE 1 MG: 1 INJECTION, SOLUTION INTRAMUSCULAR; INTRAVENOUS; SUBCUTANEOUS at 03:41

## 2018-10-03 RX ADMIN — HEPARIN SODIUM 5000 UNITS: 5000 INJECTION, SOLUTION INTRAVENOUS; SUBCUTANEOUS at 20:41

## 2018-10-03 RX ADMIN — METOPROLOL TARTRATE 25 MG: 25 TABLET ORAL at 08:34

## 2018-10-03 RX ADMIN — MEROPENEM 1 G: 1 INJECTION, POWDER, FOR SOLUTION INTRAVENOUS at 03:34

## 2018-10-03 RX ADMIN — FLECAINIDE ACETATE 50 MG: 50 TABLET ORAL at 20:41

## 2018-10-03 RX ADMIN — FLECAINIDE ACETATE 50 MG: 50 TABLET ORAL at 08:20

## 2018-10-03 RX ADMIN — FLUOXETINE HYDROCHLORIDE 40 MG: 20 CAPSULE ORAL at 08:20

## 2018-10-03 RX ADMIN — BUDESONIDE 0.5 MG: 0.5 INHALANT RESPIRATORY (INHALATION) at 08:43

## 2018-10-03 RX ADMIN — HEPARIN SODIUM 5000 UNITS: 5000 INJECTION, SOLUTION INTRAVENOUS; SUBCUTANEOUS at 13:49

## 2018-10-03 RX ADMIN — SODIUM CHLORIDE 150 ML/HR: 900 INJECTION INTRAVENOUS at 13:54

## 2018-10-03 RX ADMIN — COLCHICINE 0.6 MG: 0.6 TABLET, FILM COATED ORAL at 08:20

## 2018-10-03 NOTE — PROGRESS NOTES
"GI Daily Progress Note  Subjective     Todd Phillips is a 68 y.o. male who was admitted with Sepsis (CMS/HCC).   Secondary to ascending cholangitis.  He is feeling better after wallstent    Chief Complaint:  Fever/chills    Objective     /84   Pulse 71   Temp 98.8 °F (37.1 °C) (Oral)   Resp 18   Ht 182.9 cm (72\")   Wt 103 kg (227 lb 12.8 oz)   SpO2 97%   BMI 30.90 kg/m²     Intake/Output last 3 shifts:  I/O last 3 completed shifts:  In: 3928.2 [P.O.:720; I.V.:3208.2]  Out: 1400 [Urine:1400]  Intake/Output this shift:  I/O this shift:  In: 680 [P.O.:580; IV Piggyback:100]  Out: 400 [Urine:400]      Physical Exam  Wt Readings from Last 3 Encounters:   10/03/18 103 kg (227 lb 12.8 oz)   09/30/18 99.8 kg (220 lb)   09/28/18 98 kg (216 lb)   ,body mass index is 30.9 kg/m².,@FLOWAMB(6)@,@FLOWAMB(5)@,@FLOWAMB(8)@   CONSTITUTIONAL:  Resp CTA; no rhonchi, rales, or wheezes.  Respiration effort normal  CV RRR; no M/R/G. No lower extremity edema  GI Abd soft, NT, ND, normal active bowel sounds.    Psych: Awake and alert    DATA:    Assessment/Plan       Sepsis  Ascending Cholangitis  A Fib  Pancreatic Ca      Resume Eliquis in am.  Keep FU with oncologist in [place].  Has apt with Dr Rivas in Nov  I will sign off  Return if black stool or blood in stool        Principal Problem:    Sepsis (CMS/HCC)  Active Problems:    Hyperlipidemia    COPD (chronic obstructive pulmonary disease) (CMS/HCC)    Essential hypertension    Nonobstructive atherosclerosis of coronary artery    CKD stage 3 secondary to diabetes (CMS/HCC)    DM2 (diabetes mellitus, type 2) (CMS/HCC)    Paroxysmal atrial fibrillation    Malignant neoplasm of head of pancreas (CMS/HCC)    Sepsis (CMS/HCC)    Fever    Abdominal pain    Nausea vomiting and diarrhea    Biliary obstruction       LOS: 2 days     Eb Jimenez MD  10/03/18  6:53 PM  "

## 2018-10-03 NOTE — PROGRESS NOTES
[facility] Medicine Services  PROGRESS NOTE    Patient Name: Todd Phillips  : 1950  MRN: [id number]    Date of Admission: 10/1/2018  Length of Stay: 2  Primary Care Physician: Adiel Denney MD    Subjective   Subjective     CC: f/u sepsis    HPI: Lying in bed with wife at bedside. Had some upper abd pain and nausea last night but it is better this am. Peeing more. No new concerns.     Review of Systems  Gen- No fevers, chills  CV- No chest pain, palpitations  Resp- No cough, dyspnea  GI- No N/V/D, abd pain    Otherwise ROS is negative except as mentioned in the HPI.    Objective   Objective     Vital Signs:   Temp:  [96.9 °F (36.1 °C)-98.3 °F (36.8 °C)] 98.3 °F (36.8 °C)  Heart Rate:  [69-89] 73  Resp:  [16-18] 18  BP: (126-144)/(80-95) 143/91        Physical Exam:  Constitutional: No acute distress, awake, alert, looks better  HENT: NCAT, mucous membranes moist  Respiratory: Clear to auscultation bilaterally, respiratory effort normal   Cardiovascular: RRR, no murmurs, rubs, or gallops, palpable pedal pulses bilaterally  Gastrointestinal: Positive bowel sounds, soft, nontender, nondistended  Musculoskeletal: No bilateral ankle edema  Psychiatric: Appropriate affect, cooperative  Neurologic: Oriented x 3, strength symmetric in all extremities, Cranial Nerves grossly intact to confrontation, speech clear  Skin: No rashes    Results Reviewed:  I have personally reviewed current lab, radiology, and data and agree.      Results from last 7 days  Lab Units 10/03/18  0634 10/02/18  0552 10/01/18  0459 09/30/18  1709   WBC 10*3/mm3 5.28 7.37 10.17 5.57   HEMOGLOBIN g/dL 10.6* 11.1* 11.2* 12.5*   HEMATOCRIT % 31.8* 33.6* 33.6* 37.3*   PLATELETS 10*3/mm3 189 173 190 224   INR   --   --   --  0.99       Results from last 7 days  Lab Units 10/03/18  0634 10/02/18  0552 10/01/18  0459 09/30/18  1910 30/18  1709   SODIUM mmol/L 138 137 135  --  137   POTASSIUM mmol/L 3.9 4.3 3.9  --   4.1   CHLORIDE mmol/L 108 104 105  --  104   CO2 mmol/L 24.0 26.0 24.0  --  21.8*   BUN mg/dL 23 26* 17  --  21   CREATININE mg/dL 1.03 1.33* 1.22  --  1.31*   GLUCOSE mg/dL 88 98 167*  --  192*   CALCIUM mg/dL 8.0* 8.4* 8.3*  --  9.5   ALT (SGPT) U/L 142* 176* 202*  --  225*   AST (SGOT) U/L 114* 170* 190*  --  235*   TROPONIN I ng/mL  --   --   --  0.007 <0.006     Estimated Creatinine Clearance: 82.9 mL/min (by C-G formula based on SCr of 1.03 mg/dL).  No results found for: BNP    Microbiology Results Abnormal     Procedure Component Value - Date/Time    Blood Culture - Blood, [[id number]]  (Normal) Collected:  10/01/18 0459    Lab Status:  Preliminary result Specimen:  Blood from Arm, Left Updated:  10/03/18 0531     Blood Culture No growth at 2 days    Blood Culture - Blood, [[id number]]  (Normal) Collected:  10/01/18 0459    Lab Status:  Preliminary result Specimen:  Blood from Arm, Right Updated:  10/03/18 0530     Blood Culture No growth at 2 days          Imaging Results (last 24 hours)     ** No results found for the last 24 hours. **        Results for orders placed during the hospital encounter of 10/22/17   Adult Transthoracic Echo Complete W/ Cont if Necessary Per Protocol    Narrative · Left ventricular systolic function is normal. Estimated EF = 60%.  · The cardiac valves are anatomically and functionally normal.          I have reviewed the medications.      budesonide 0.25 mg Nebulization BID - RT   colchicine 0.6 mg Oral Daily   flecainide 50 mg Oral Q12H   FLUoxetine 40 mg Oral Daily   heparin (porcine) 5,000 Units Subcutaneous Q8H   insulin lispro 0-7 Units Subcutaneous 4x Daily With Meals & Nightly   meropenem 1 g Intravenous Q8H   metoprolol tartrate 25 mg Oral Daily   Morphine 30 mg Oral Q12H   pantoprazole 40 mg Oral Q AM         Assessment/Plan   Assessment / Plan     Hospital Problem List     * (Principal)Sepsis (CMS/HCC)    Hyperlipidemia    COPD (chronic obstructive pulmonary disease)  (CMS/[facility])    Essential hypertension    Nonobstructive atherosclerosis of coronary artery    Overview Signed 10/22/2017 12:37 PM by Jono Cruz MD     [facility] 2014 dr. Reyes: 20% dz LAD, mild plaque         CKD stage 3 secondary to diabetes (CMS/[facility])    Overview Signed 10/22/2017 12:37 PM by Jono Cruz MD     Baseline creatinine 1.4-1.5         DM2 (diabetes mellitus, type 2) (CMS/[facility])    Paroxysmal atrial fibrillation    Malignant neoplasm of head of pancreas (CMS/[facility])    Overview Signed 9/5/2018  9:58 AM by Olya Vaughn     Added automatically from request for surgery [id number]         Sepsis (CMS/[facility])    Fever    Abdominal pain    Nausea vomiting and diarrhea    Biliary obstruction    Overview Signed 10/1/2018  1:55 PM by Eb Jimenez MD     Added automatically from request for surgery [id number]               Brief Hospital Course to date:  Todd Phillips is a 68 y.o. male with likely pancreatic cancer presenting from home with sepsis due to cholangitis from occluded biliary stent. He underwent stent placement by Dr. Luna on 10/1.     Assessment & Plan:  --I have d/w Dr. Luna. Patient stent occluded due to mass effect. Metal stent placed with expression of purulent material. His pain and LFTs are improving.   --Blood cx with widely susceptible klebsiella. ID following. Continue IV abx per them.   --Pathology appears c/w adenocarcinoma. D/W Dr. Jimenez today, he has d/w Dr. Brody as to definitive plan. Patient will f/u with oncologist in [place] and [facility] will follow him there.  --DM2 currently controlled. Continue SSI.  --PT/OT.  --Labs in am.     DVT Prophylaxis: [facility]    CODE STATUS:   Code Status and Medical Interventions:   Ordered at: 10/01/18 9024     Level Of Support Discussed With:    Patient     Code Status:    CPR     Medical Interventions (Level of Support Prior to Arrest):    Full       Disposition: I expect the patient to be discharged TBD.      Electronically signed by Tamara  TRU Cooley, II, DO, 10/03/18, 12:55 PM.

## 2018-10-03 NOTE — PLAN OF CARE
Problem: Patient Care Overview  Goal: Plan of Care Review  Outcome: Ongoing (interventions implemented as appropriate)   10/03/18 0811   Coping/Psychosocial   Plan of Care Reviewed With spouse;patient   Plan of Care Review   Progress no change   OTHER   Outcome Summary OT evaluation completed. Pt. demostrating good UE strength, but deficit with balance and activity tolerance impacting ADL independence. Skilled OT services appropriate to address deficit areas and assist in return to PLOF. Liklely home with family assist . May need wx at discharge.

## 2018-10-03 NOTE — THERAPY EVALUATION
Acute Care - Physical Therapy Initial Evaluation  [facility]     Patient Name: Todd Phillips  : 1950  MRN: [id number]  Today's Date: 10/3/2018   Onset of Illness/Injury or Date of Surgery: 10/01/18  Date of Referral to PT: 10/03/18  Referring Physician: VIMAL Ellsworth      Admit Date: 10/1/2018    Visit Dx:     ICD-10-CM ICD-9-CM   1. Sepsis, due to unspecified organism (CMS/[facility]) A41.9 038.9     995.91   2. Fever, unspecified fever cause R50.9 780.60   3. Biliary obstruction K83.1 576.2   4. Impaired mobility and ADLs Z74.09 799.89     Patient Active Problem List   Diagnosis   • Hyperlipidemia   • COPD (chronic obstructive pulmonary disease) (CMS/[facility])   • Essential hypertension   • Gout without tophus   • Anxiety and depression   • Primary insomnia   • Gastroesophageal reflux disease without esophagitis   • Nonobstructive atherosclerosis of coronary artery   • CKD stage 3 secondary to diabetes (CMS/[facility])   • DM2 (diabetes mellitus, type 2) (CMS/[facility])   • Paroxysmal atrial fibrillation   • Chronic anticoagulation, eliquis   • Encounter for monitoring flecainide therapy   • Preoperative clearance   • Biliary dyskinesia   • Obesity (BMI 30.0-34.9)   • Hyperbilirubinemia   • Cholecystitis   • Malignant neoplasm of head of pancreas (CMS/[facility])   • Bacteremia due to Escherichia coli   • Sepsis (CMS/HCC)   • Sepsis (CMS/[facility])   • Fever   • Abdominal pain   • Nausea vomiting and diarrhea   • Biliary obstruction     Past Medical History:   Diagnosis Date   • Abnormal ECG    • Antral gastritis    • Atrial fibrillation (CMS/HCC)     treated with oral blood thinner   • Chest pain    • COPD (chronic obstructive pulmonary disease) (CMS/HCC)    • Coronary artery disease     no stents   • Diabetes mellitus (CMS/HCC)     type 2    • Duodenitis    • Emphysema of lung (CMS/[facility])    • Gallbladder disease     removed    • GERD (gastroesophageal reflux disease)    • Hyperlipidemia    • Hypertension    • Kidney stone    • Kidney  stones     had lithotripsy and passed 36 kidney stones as well as had one surgically removed.   • Malignant neoplasm of head of pancreas (CMS/HCC) 9/5/2018   • Palpitations    • Reflux esophagitis    • Renal failure     stage 3 kidney disease     Past Surgical History:   Procedure Laterality Date   • CARDIAC CATHETERIZATION  11/01/1999   • CARDIOVASCULAR STRESS TEST  09/2012   • CHOLECYSTECTOMY N/A 8/10/2018    Procedure: CHOLECYSTECTOMY LAPAROSCOPIC;  Surgeon: Ronak Downs MD;  Location: [facility] OR;  Service: General   • CYSTOSCOPY BLADDER STONE LITHOTRIPSY     • ECHO - CONVERTED  09/2012   • ERCP N/A 8/14/2018    Procedure: ENDOSCOPIC RETROGRADE CHOLANGIOPANCREATOGRAPHY WITH PAPILLOTOMY;  Surgeon: Scot Su MD;  Location:  [facility] OR;  Service: Gastroenterology   • ERCP N/A 10/1/2018    Procedure: ENDOSCOPIC RETROGRADE CHOLANGIOPANCREATOGRAPHY;  Surgeon: Jefry Luna MD;  Location:  [facility];  Service: Gastroenterology   • KIDNEY STONE SURGERY     • KIDNEY STONE SURGERY      open abdominal surgery   • UPPER GASTROINTESTINAL ENDOSCOPY  08/30/2012        PT ASSESSMENT (last 12 hours)      Physical Therapy Evaluation     Row Name 10/03/18 1015          PT Evaluation Time/Intention    Subjective Information complains of;fatigue  -VG     Document Type evaluation  -VG     Mode of Treatment individual therapy;physical therapy  -VG     Patient Effort good  -VG     Symptoms Noted During/After Treatment fatigue;shortness of breath  -VG     Row Name 10/03/18 1015          General Information    Patient Profile Reviewed? yes  -VG     Onset of Illness/Injury or Date of Surgery 10/01/18  -VG     Referring Physician VIMAL Ellsworth  -VG     Patient Observations alert;cooperative;agree to therapy  -VG     Patient/Family Observations Wife present at bedside  -VG     General Observations of Patient In bed, sleeping, RA, tele, IV  -VG     Prior Level of Function independent:;all household  mobility;community mobility;ADL's;home management  -VG     Equipment Currently Used at Home shower chair  -VG     Pertinent History of Current Functional Problem Pt transferred from outside hospital, c/o acute onset abdominal pain, N/V, delerium, fever, and tachy. Blood cultures (+) Klebsiella bacterium. Multiple recent hospitalizations for choleccytectomy, ERC{ with billiary stricture dilation and stent placement, and acute sepsis x 2. PMHx pancreatic head mass s/p biopsy 9/28, Afib, and COPD. Currently managed medically for acute sepsis, acute/recurrent cholangitis, JOVANNA, and acute encephalopathy. ERCP 10/1 indicated biliiary stent obstruction, new stent placement.   -VG     Existing Precautions/Restrictions fall;other (see comments)   CDIFF rule out, spore prec  -VG     Limitations/Impairments safety/cognitive  -VG     Risks Reviewed patient and family:;nausea/vomiting;LOB;dizziness;increased discomfort;change in vital signs  -VG     Benefits Reviewed patient and family:;improve function;increase independence;increase strength;increase balance;decrease pain  -VG     Barriers to Rehab medically complex;previous functional deficit  -VG     Row Name 10/03/18 1015          Relationship/Environment    Primary Source of Support/Comfort spouse  -VG     Lives With spouse  -VG     Concerns About Impact on Relationships Wife available 24/7 for assist/spv.  -VG     Row Name 10/03/18 1015          Resource/Environmental Concerns    Current Living Arrangements home/apartment/condo  -VG     Resource/Environmental Concerns home accessibility  -VG     Home Accessibility Concerns stairs to enter home  -VG     Row Name 10/03/18 1015          Home Main Entrance    Number of Stairs, Main Entrance two  -VG     Stair Railings, Main Entrance none  -VG     Stairs Comment, Main Entrance 2 JAIDEN to [facility] with tub shower  -VG     Row Name 10/03/18 1015          Cognitive Assessment/Interventions    Additional Documentation Cognitive  Assessment/Intervention (Group)  -     Row Name 10/03/18 1015          Cognitive Assessment/Intervention- PT/OT    Affect/Mental Status (Cognitive) flat/blunted affect  -VG     Orientation Status (Cognition) oriented x 4  -VG     Follows Commands (Cognition) follows one step commands;75-90% accuracy;delayed response/completion;increased processing time needed;repetition of directions required  -VG     Cognitive Function (Cognitive) safety deficit  -VG     Safety Deficit (Cognitive) moderate deficit;impulsivity;insight into deficits/self awareness;problem solving  -VG     Personal Safety Interventions fall prevention program maintained;gait belt;nonskid shoes/slippers when out of bed;supervised activity  -VG     Row Name 10/03/18 1015          Bed Mobility Assessment/Treatment    Bed Mobility Assessment/Treatment supine-sit  -VG     Supine-Sit Caret (Bed Mobility) supervision  -VG     Comment (Bed Mobility) Inc time/effort, no dizziness noted upon sitting EOB.  -     Row Name 10/03/18 1015          Transfer Assessment/Treatment    Transfer Assessment/Treatment sit-stand transfer;stand-sit transfer  -VG     Comment (Transfers) Cues for sequencing and hand placement.  -VG     Sit-Stand Caret (Transfers) contact guard;verbal cues  -VG     Stand-Sit Caret (Transfers) contact guard;verbal cues  -VG     Row Name 10/03/18 1015          Sit-Stand Transfer    Assistive Device (Sit-Stand Transfers) walker, front-wheeled  -     Row Name 10/03/18 1015          Stand-Sit Transfer    Assistive Device (Stand-Sit Transfers) walker, front-wheeled  -     Row Name 10/03/18 1015          Gait/Stairs Assessment/Training    Gait/Stairs Assessment/Training gait/ambulation independence;gait/ambulation assistive device;distance ambulated;gait pattern;gait deviations;maintains weight-bearing status  -VG     Caret Level (Gait) contact guard;minimum assist (75% patient effort);verbal cues  -VG     Assistive  Device (Gait) walker, front-wheeled  -VG     Distance in Feet (Gait) 50  -VG     Pattern (Gait) step-to;step-through  -VG     Deviations/Abnormal Patterns (Gait) ataxic;base of support, narrow;gait speed decreased;festinating/shuffling;stride length decreased  -VG     Bilateral Gait Deviations forward flexed posture  -VG     Comment (Gait/Stairs) Cues for safety, mild difficulty with safety and problem solving, slightly impulsive. Minor LOB on turn requiring MARLYN for recovery. Distance limited by SOA and fatigue. Unsteadiness and weakness noted in BLEs.  -VG     Row Name 10/03/18 1015          General ROM    GENERAL ROM COMMENTS BLEs WFL  -VG     Row Name 10/03/18 1015          MMT (Manual Muscle Testing)    General MMT Comments BLEs 4/5 grossly  -VG     Row Name 10/03/18 1015          Sensory Assessment/Intervention    Sensory General Assessment no sensation deficits identified  -VG     Row Name 10/03/18 1015          Pain Assessment    Additional Documentation Pain Scale: Numbers Pre/Post-Treatment (Group)  -VG     Row Name 10/03/18 1015          Pain Scale: Numbers Pre/Post-Treatment    Pain Scale: Numbers, Pretreatment 2/10  -VG     Pain Scale: Numbers, Post-Treatment 2/10  -VG     Pain Location - Orientation generalized  -VG     Pain Location abdomen  -VG     Pain Intervention(s) Repositioned;Ambulation/increased activity  -VG     Row Name 10/03/18 1015          Coping    Observed Emotional State accepting  -VG     Verbalized Emotional State acceptance  -VG     Row Name 10/03/18 1015          Plan of Care Review    Plan of Care Reviewed With patient;spouse  -VG     Row Name 10/03/18 1015          Physical Therapy Clinical Impression    Date of Referral to PT 10/03/18  -VG     PT Diagnosis (PT Clinical Impression) Impaired endurance, impaired strength, impaired balance.  -VG     Criteria for Skilled Interventions Met (PT Clinical Impression) yes;treatment indicated  -VG     Pathology/Pathophysiology Noted  (Describe Specifically for Each System) musculoskeletal  -VG     Impairments Found (describe specific impairments) aerobic capacity/endurance;gait, locomotion, and balance;muscle performance  -VG     Rehab Potential (PT Clinical Summary) good, to achieve stated therapy goals  -VG     Care Plan Review (PT) evaluation/treatment results reviewed;patient/other agree to care plan  -VG     Care Plan Review, Other Participant (PT Clinical Impression) spouse  -VG     Patient/Family Concerns, Anticipated Discharge Disposition (PT) Pt and wife not agreeable to SNF, prefer to go home with HH  -VG     Row Name 10/03/18 1015          Physical Therapy Goals    Bed Mobility Goal Selection (PT) bed mobility, PT goal 1  -VG     Transfer Goal Selection (PT) transfer, PT goal 1  -VG     Gait Training Goal Selection (PT) gait training, PT goal 1  -VG     Stairs Goal Selection (PT) stairs, PT goal 1  -VG     Additional Documentation Stairs Goal Selection (PT) (Row)  -VG     Row Name 10/03/18 1015          Bed Mobility Goal 1 (PT)    Activity/Assistive Device (Bed Mobility Goal 1, PT) sit to supine/supine to sit  -VG     Branch Level/Cues Needed (Bed Mobility Goal 1, PT) independent  -VG     Time Frame (Bed Mobility Goal 1, PT) long term goal (LTG);2 weeks  -VG     Row Name 10/03/18 1015          Transfer Goal 1 (PT)    Activity/Assistive Device (Transfer Goal 1, PT) sit-to-stand/stand-to-sit;walker, rolling  -VG     Branch Level/Cues Needed (Transfer Goal 1, PT) conditional independence  -VG     Time Frame (Transfer Goal 1, PT) long term goal (LTG);2 weeks  -VG     Row Name 10/03/18 1015          Gait Training Goal 1 (PT)    Activity/Assistive Device (Gait Training Goal 1, PT) gait (walking locomotion);assistive device use;walker, rolling  -VG     Branch Level (Gait Training Goal 1, PT) supervision required  -VG     Distance (Gait Goal 1, PT) 150  -VG     Time Frame (Gait Training Goal 1, PT) long term goal (LTG);2  weeks  -VG     Row Name 10/03/18 1015          Stairs Goal 1 (PT)    Activity/Assistive Device (Stairs Goal 1, PT) stairs, all skills;cane, straight  -VG     Parmer Level/Cues Needed (Stairs Goal 1, PT) contact guard assist  -VG     Number of Stairs (Stairs Goal 1, PT) 2  -VG     Time Frame (Stairs Goal 1, PT) long term goal (LTG);2 weeks  -VG     Row Name 10/03/18 1015          Patient Education Goal (PT)    Activity (Patient Education Goal, PT) HEP  -VG     Parmer/Cues/Accuracy (Memory Goal 2, PT) demonstrates adequately;verbalizes understanding  -VG     Time Frame (Patient Education Goal, PT) long term goal (LTG);2 weeks  -VG     Row Name 10/03/18 1015          Positioning and Restraints    Pre-Treatment Position in bed  -VG     Post Treatment Position chair  -VG     In Bed sitting;call light within reach;encouraged to call for assist;with family/caregiver  -VG     Row Name 10/03/18 1015          Living Environment    Home Accessibility stairs to enter home;tub/shower is not walk in  -VG       User Key  (r) = Recorded By, (t) = Taken By, (c) = Cosigned By    Initials Name Provider Type    VG Maribeth Lynn, PT Physical Therapist          Physical Therapy Education     Title: PT OT SLP Therapies (Active)     Topic: Physical Therapy (Active)     Point: Mobility training (Active)    Learning Progress Summary     Learner Status Readiness Method Response Comment Documented by    Patient Active Acceptance E NR Educated on correct mechanics for safe functional mobility, reinforcement needed d/t current cognitive status. Discussed d/c recommendations/planning and PT POC. VG 10/03/18 1015    Significant Other Active Acceptance E NR Educated on correct mechanics for safe functional mobility, reinforcement needed d/t current cognitive status. Discussed d/c recommendations/planning and PT POC. VG 10/03/18 1015          Point: Body mechanics (Active)    Learning Progress Summary     Learner Status Readiness  Method Response Comment Documented by    Patient Active Acceptance E NR Educated on correct mechanics for safe functional mobility, reinforcement needed d/t current cognitive status. Discussed d/c recommendations/planning and PT POC.  10/03/18 1015    Significant Other Active Acceptance E NR Educated on correct mechanics for safe functional mobility, reinforcement needed d/t current cognitive status. Discussed d/c recommendations/planning and PT POC.  10/03/18 1015          Point: Precautions (Active)    Learning Progress Summary     Learner Status Readiness Method Response Comment Documented by    Patient Active Acceptance E NR Educated on correct mechanics for safe functional mobility, reinforcement needed d/t current cognitive status. Discussed d/c recommendations/planning and PT POC.  10/03/18 1015    Significant Other Active Acceptance E NR Educated on correct mechanics for safe functional mobility, reinforcement needed d/t current cognitive status. Discussed d/c recommendations/planning and PT POC.  10/03/18 1015                      User Key     Initials Effective Dates Name Provider Type Discipline     05/29/18 -  Maribeth Lynn, PT Physical Therapist PT                PT Recommendation and Plan  Anticipated Discharge Disposition (PT): home with assist, home with home health, other (see comments) (Vs SNF pending IP progress)  Planned Therapy Interventions (PT Eval): bed mobility training, balance training, gait training, home exercise program, patient/family education, stair training, strengthening, transfer training  Therapy Frequency (PT Clinical Impression): daily  Outcome Summary/Treatment Plan (PT)  Anticipated Equipment Needs at Discharge (PT): front wheeled walker  Anticipated Discharge Disposition (PT): home with assist, home with home health, other (see comments) (Vs SNF pending IP progress)  Patient/Family Concerns, Anticipated Discharge Disposition (PT): Pt and wife not agreeable to  SNF, prefer to go home with HH  Plan of Care Reviewed With: patient, spouse  Outcome Summary: IP PT ricky completed this date. Pt ambulated 50 ft with RW and CGA-MARLYN, distance limited by fatigue and SOA. Minor LOB requiring MARLYN to correct, demonstrates impaired motor planning and problem solving with mobility and safety. Recommend appropriate for home with HH PT vs SNF pending IP progress, pt and spouse not open to SNF, prefers to go home. Will progress pt as able per POC.          Outcome Measures     Row Name 10/03/18 1015 10/03/18 0811          How much help from another person do you currently need...    Turning from your back to your side while in flat bed without using bedrails? 4  -VG  --     Moving from lying on back to sitting on the side of a flat bed without bedrails? 4  -VG  --     Moving to and from a bed to a chair (including a wheelchair)? 3  -VG  --     Standing up from a chair using your arms (e.g., wheelchair, bedside chair)? 3  -VG  --     Climbing 3-5 steps with a railing? 2  -VG  --     To walk in hospital room? 3  -VG  --     AM-PAC 6 Clicks Score 19  -VG  --        How much help from another is currently needed...    Putting on and taking off regular lower body clothing?  -- 3  -MARC     Bathing (including washing, rinsing, and drying)  -- 3  -MARC     Toileting (which includes using toilet bed pan or urinal)  -- 3  -MARC     Putting on and taking off regular upper body clothing  -- 4  -MARC     Taking care of personal grooming (such as brushing teeth)  -- 3  -MARC     Eating meals  -- 4  -MARC     Score  -- 20  -MARC        Functional Assessment    Outcome Measure Options AM-PAC 6 Clicks Basic Mobility (PT)  -VG AM-PAC 6 Clicks Daily Activity (OT)  -MARC       User Key  (r) = Recorded By, (t) = Taken By, (c) = Cosigned By    Initials Name Provider Type    Genny Ramírez, OT Occupational Therapist    Maribeth Campa, PT Physical Therapist           Time Calculation:         PT Charges     Row Name  10/03/18 1015             Time Calculation    Start Time 1015  -VG      PT Received On 10/03/18  -VG      PT Goal Re-Cert Due Date 10/13/18  -VG        User Key  (r) = Recorded By, (t) = Taken By, (c) = Cosigned By    Initials Name Provider Type    VG Maribeth Lynn, PT Physical Therapist        Therapy Suggested Charges     Code   Minutes Charges    None           Therapy Charges for Today     Code Description Service Date Service Provider Modifiers Qty    97157113154 HC PT EVAL MOD COMPLEXITY 3 10/3/2018 Maribeth Lynn, PT GP 1          PT G-Codes  Outcome Measure Options: AM-PAC 6 Clicks Basic Mobility (PT)  AM-PAC 6 Clicks Score: 19  Score: 20      Ciera Lynn PT  10/3/2018

## 2018-10-03 NOTE — PROGRESS NOTES
Continued Stay Note  [facility]     Patient Name: Todd Phillips  MRN: [id number]  Today's Date: 10/3/2018    Admit Date: 10/1/2018          Discharge Plan     Row Name 10/03/18 1405       Plan    Plan update    Patient/Family in Agreement with Plan yes    Plan Comments Spoke with patient and wife at bedside regarding discharge plan.  Patient reports that he may be going home in the next day or so with antibiotics.  Discussed possible need for HH, patient in agreement with plan.  CM following.  Patient plan is to discharge home with HH services via car with family to transport when medically ready.     Final Discharge Disposition Code 06 - home with home health care              Discharge Codes    No documentation.       Expected Discharge Date and Time     Expected Discharge Date Expected Discharge Time    Oct 5, 2018             Aisha Quispe RN

## 2018-10-03 NOTE — PLAN OF CARE
Problem: Patient Care Overview  Goal: Plan of Care Review   10/03/18 1015   Coping/Psychosocial   Plan of Care Reviewed With patient;spouse   OTHER   Outcome Summary IP PT eval completed this date. Pt ambulated 50 ft with RW and CGA-MARLYN, distance limited by fatigue and SOA. Minor LOB requiring MARLYN to correct, demonstrates impaired motor planning and problem solving with mobility and safety. Recommend appropriate for home with HH PT vs SNF pending IP progress, pt and spouse not open to SNF, prefers to go home. Will progress pt as able per POC.

## 2018-10-03 NOTE — THERAPY EVALUATION
Acute Care - Occupational Therapy Initial Evaluation  [facility]     Patient Name: Todd Phillips  : 1950  MRN: [id number]  Today's Date: 10/3/2018  Onset of Illness/Injury or Date of Surgery: 10/01/18  Date of Referral to OT: 10/03/18  Referring Physician: Jodee CELIS    Admit Date: 10/1/2018       ICD-10-CM ICD-9-CM   1. Sepsis, due to unspecified organism (CMS/[facility]) A41.9 038.9     995.91   2. Fever, unspecified fever cause R50.9 780.60   3. Biliary obstruction K83.1 576.2   4. Impaired mobility and ADLs Z74.09 799.89     Patient Active Problem List   Diagnosis   • Hyperlipidemia   • COPD (chronic obstructive pulmonary disease) (CMS/[facility])   • Essential hypertension   • Gout without tophus   • Anxiety and depression   • Primary insomnia   • Gastroesophageal reflux disease without esophagitis   • Nonobstructive atherosclerosis of coronary artery   • CKD stage 3 secondary to diabetes (CMS/[facility])   • DM2 (diabetes mellitus, type 2) (CMS/[facility])   • Paroxysmal atrial fibrillation   • Chronic anticoagulation, eliquis   • Encounter for monitoring flecainide therapy   • Preoperative clearance   • Biliary dyskinesia   • Obesity (BMI 30.0-34.9)   • Hyperbilirubinemia   • Cholecystitis   • Malignant neoplasm of head of pancreas (CMS/HCC)   • Bacteremia due to Escherichia coli   • Sepsis (CMS/HCC)   • Sepsis (CMS/HCC)   • Fever   • Abdominal pain   • Nausea vomiting and diarrhea   • Biliary obstruction     Past Medical History:   Diagnosis Date   • Abnormal ECG    • Antral gastritis    • Atrial fibrillation (CMS/HCC)     treated with oral blood thinner   • Chest pain    • COPD (chronic obstructive pulmonary disease) (CMS/HCC)    • Coronary artery disease     no stents   • Diabetes mellitus (CMS/HCC)     type 2    • Duodenitis    • Emphysema of lung (CMS/[facility])    • Gallbladder disease     removed    • GERD (gastroesophageal reflux disease)    • Hyperlipidemia    • Hypertension    • Kidney stone    • Kidney stones     had  lithotripsy and passed 36 kidney stones as well as had one surgically removed.   • Malignant neoplasm of head of pancreas (CMS/HCC) 9/5/2018   • Palpitations    • Reflux esophagitis    • Renal failure     stage 3 kidney disease     Past Surgical History:   Procedure Laterality Date   • CARDIAC CATHETERIZATION  11/01/1999   • CARDIOVASCULAR STRESS TEST  09/2012   • CHOLECYSTECTOMY N/A 8/10/2018    Procedure: CHOLECYSTECTOMY LAPAROSCOPIC;  Surgeon: Ronak Downs MD;  Location: [facility] OR;  Service: General   • CYSTOSCOPY BLADDER STONE LITHOTRIPSY     • ECHO - CONVERTED  09/2012   • ERCP N/A 8/14/2018    Procedure: ENDOSCOPIC RETROGRADE CHOLANGIOPANCREATOGRAPHY WITH PAPILLOTOMY;  Surgeon: Scot Su MD;  Location:  [facility] OR;  Service: Gastroenterology   • ERCP N/A 10/1/2018    Procedure: ENDOSCOPIC RETROGRADE CHOLANGIOPANCREATOGRAPHY;  Surgeon: Jefry Luna MD;  Location:  [facility];  Service: Gastroenterology   • KIDNEY STONE SURGERY     • KIDNEY STONE SURGERY      open abdominal surgery   • UPPER GASTROINTESTINAL ENDOSCOPY  08/30/2012          OT ASSESSMENT FLOWSHEET (last 72 hours)      Occupational Therapy Evaluation     Row Name 10/03/18 0811                   OT Evaluation Time/Intention    Subjective Information complains of;weakness;fatigue;nausea/vomiting   nausea after moving around  -MARC        Document Type evaluation  -MARC        Mode of Treatment individual therapy;occupational therapy  -MARC        Patient Effort excellent  -MARC           General Information    Patient Profile Reviewed? yes  -MARC        Onset of Illness/Injury or Date of Surgery 10/01/18  -MARC        Referring Physician Jodee CELIS  -MARC        Patient Observations alert;cooperative;agree to therapy  -MARC        Patient/Family Observations wife present.  Pt. supine with IV/teley  -MARC        Prior Level of Function independent:;feeding;grooming;dressing;min assist:;all household mobility;bathing;dependent:;home  management;driving;shopping  -MARC        Equipment Currently Used at Home shower chair  -MARC        Pertinent History of Current Functional Problem Pt. with resent ERCP wtih stent placement 8/14 and choley 8/10 and tx Klebsiella bactermia 8/18 with antibiotics.  Pt. recently developed fever, abd. pain and N/V.  Pt. being ruled out for C diff.  and CT shows mass head of pancreas. Sepsis.   -MARC        Existing Precautions/Restrictions fall   spore precuations  -MARC        Risks Reviewed patient and family:;LOB;increased discomfort;change in vital signs;lines disloged  -MARC        Benefits Reviewed patient and family:;improve function;increase independence;increase balance;increase knowledge   activity tolerance  -MARC        Barriers to Rehab medically complex;previous functional deficit  -MARC           Relationship/Environment    Primary Source of Support/Comfort spouse  -MARC        Name of Support/Comfort Primary Source Emmy Phillips  -MARC        Lives With spouse  -MARC        Family Caregiver if Needed spouse  -MARC           Resource/Environmental Concerns    Current Living Arrangements home/apartment/condo  -MARC           Cognitive Assessment/Interventions    Additional Documentation Cognitive Assessment/Intervention (Group)  -MARC           Cognitive Assessment/Intervention- PT/OT    Orientation Status (Cognition) oriented x 4  -MARC        Follows Commands (Cognition) WFL  -MARC        Cognitive Function (Cognitive) safety deficit  -MARC        Safety Deficit (Cognitive) mild deficit;safety precautions awareness   not used walker before and unsafe with mvmt at times  -MARC        Personal Safety Interventions fall prevention program maintained;gait belt;nonskid shoes/slippers when out of bed  -MARC           Safety Issues, Functional Mobility    Safety Issues Affecting Function (Mobility) safety precaution awareness  -MARC        Impairments Affecting Function (Mobility) endurance/activity tolerance;balance  -MARC           Bed Mobility  Assessment/Treatment    Bed Mobility Assessment/Treatment supine-sit;scooting/bridging  -MARC        Scooting/Bridging [place] (Bed Mobility) supervision  -        Supine-Sit [place] (Bed Mobility) supervision  -MARC        Bed Mobility, Safety Issues decreased use of arms for pushing/pulling;decreased use of legs for bridging/pushing;impaired trunk control for bed mobility  -MARC        Assistive Device (Bed Mobility) bed rails  -MARC        Comment (Bed Mobility) extra effort, but performed.  -MARC           Functional Mobility    Functional Mobility- Ind. Level contact guard assist  -MARC        Functional Mobility- Device rolling walker  -MARC        Functional Mobility-Distance (Feet) 20  -MARC        Functional Mobility- Comment pt. wanting to  walker with turns.  -MARC           Transfer Assessment/Treatment    Transfer Assessment/Treatment sit-stand transfer;stand-sit transfer  -MARC           Sit-Stand Transfer    Sit-Stand [place] (Transfers) supervision  -        Assistive Device (Sit-Stand Transfers) walker, front-wheeled  -           Stand-Sit Transfer    Stand-Sit [place] (Transfers) supervision;verbal cues   cues to bring walker back with him  -        Assistive Device (Stand-Sit Transfers) walker, front-wheeled  -           ADL Assessment/Intervention    BADL Assessment/Intervention lower body dressing  -MARC           Lower Body Dressing Assessment/Training    Lower Body Dressing [place] Level doff;don;socks;independent  -MARC        Comment (Lower Body Dressing) good indep. with sitting portion, but likely need CGA any standing task  -MARC           BADL Safety/Performance    Impairments, BADL Safety/Performance endurance/activity tolerance;balance  -MARC           General ROM    GENERAL ROM COMMENTS WFL AROM BUE  -MARC           MMT (Manual Muscle Testing)    General MMT Comments BUE 4+ to 5/5  -MARC           Motor Assessment/Interventions    Additional Documentation Balance  (Group);Balance Interventions (Group);Therapeutic Exercise (Group);Therapeutic Exercise Interventions (Group)  -MARC           Balance    Balance static standing balance  -MARC           Static Standing Balance    Level of [place] (Supported Standing, Static Balance) supervision  -MARC        Time Able to Maintain Position (Supported Standing, Static Balance) 30 to 45 seconds  -MARC           Sensory Assessment/Intervention    Sensory General Assessment no sensation deficits identified   BUE per pt. report  -MARC           Positioning and Restraints    Pre-Treatment Position in bed  -MARC        Post Treatment Position bed  -MARC        In Bed sitting EOB;call light within reach;with nsg;with family/caregiver  -MARC           Pain Assessment    Additional Documentation Pain Scale: Numbers Pre/Post-Treatment (Group)  -MARC           Pain Scale: Numbers Pre/Post-Treatment    Pain Scale: Numbers, Pretreatment 0/10 - no pain  -MARC        Pain Scale: Numbers, Post-Treatment 0/10 - no pain  -MARC           Clinical Impression (OT)    Date of Referral to OT 10/03/18  -MARC        OT Diagnosis Impaired ADL and mobility independence due to decreased activity tolerance and balance.  -MARC        Patient/Family Goals Statement (OT Eval) Plans return home with family assist  -MARC        Criteria for Skilled Therapeutic Interventions Met (OT Eval) yes;treatment indicated  -MARC        Rehab Potential (OT Eval) good, to achieve stated therapy goals  -MARC        Therapy Frequency (OT Eval) daily  -MARC        Care Plan Review (OT) evaluation/treatment results reviewed;care plan/treatment goals reviewed;risks/benefits reviewed;current/potential barriers reviewed;patient/other agree to care plan  -MARC        Care Plan Review, Other Participant (OT Eval) spouse  -MARC        Anticipated Equipment Needs at Discharge (OT) front wheeled walker   pending progress  -MARC        Anticipated Discharge Disposition (OT) home with assist  -MARC           Vital Signs    Pre  Systolic BP Rehab 144  -MARC        Pre Treatment Diastolic BP 95  -MARC        Post Systolic BP Rehab 143  -MARC        Post Treatment Diastolic BP 91  -MARC        Pretreatment Heart Rate (beats/min) 73  -MARC        Posttreatment Heart Rate (beats/min) 78  -MARC        Pre SpO2 (%) 96  -MARC        O2 Delivery Pre Treatment room air  -MARC        Post SpO2 (%) 97  -MARC        O2 Delivery Post Treatment room air  -MARC        Pre Patient Position Supine  -MARC        Intra Patient Position Standing  -MARC        Post Patient Position Sitting  -MARC           Planned OT Interventions    Planned Therapy Interventions (OT Eval) activity tolerance training;ROM/therapeutic exercise;patient/caregiver education/training;functional balance retraining  -MARC           OT Goals    Transfer Goal Selection (OT) transfer, OT goal 1  -MARC        Balance Goal Selection (OT) balance, OT goal 1  -MARC        Activity Tolerance Goal Selection (OT) activity tolerance, OT goal 1  -MARC        Additional Documentation Activity Tolerance Goal Selection (OT) (Row);Balance Goal Selection (OT) (Row)  -MARC           Transfer Goal 1 (OT)    Activity/Assistive Device (Transfer Goal 1, OT) sit-to-stand/stand-to-sit;toilet  -MARC        [place] Level/Cues Needed (Transfer Goal 1, OT) conditional independence  -MARC        Time Frame (Transfer Goal 1, OT) long term goal (LTG);1 week  -MARC        Progress/Outcome (Transfer Goal 1, OT) goal ongoing  -MARC           Balance Goal 1 (OT)    Activity/Assistive Device (Balance Goal 1, OT) standing, dynamic;walker, rolling   AD prn  -MARC        [place] Level/Cues Needed (Balance Goal 1, OT) supervision required   higher level balance exer., item retrieval, ADL task  -MARC        Time Frame (Balance Goal 1, OT) long term goal (LTG);1 week  -MARC        Progress/Outcomes (Balance Goal 1, OT) goal ongoing  -MARC            Activity Tolerance Goal 1 (OT)    Activity Tolerance Goal 1 (OT) With ther. exercises, ADL task and mobility  -MARC         Activity Level (Endurance Goal 1, OT) 10 min activity   one rest 30 sec or less  -MARC        Time Frame (Activity Tolerance Goal 1, OT) long term goal (LTG);1 week  -MARC        Progress/Outcome (Activity Tolerance Goal 1, OT) goal ongoing  -MARC           Living Environment    Home Accessibility tub/shower is not walk in  -MARC          User Key  (r) = Recorded By, (t) = Taken By, (c) = Cosigned By    Initials Name Effective Dates    Genny Ramírez OT 06/08/18 -            Occupational Therapy Education     Title: PT OT SLP Therapies (Active)     Topic: Occupational Therapy (Active)     Point: ADL training (Done)     Description: Instruct learner(s) on proper safety adaptation and remediation techniques during self care or transfers.   Instruct in proper use of assistive devices.   Learning Progress Summary     Learner Status Readiness Method Response Comment Documented by    Patient Done Acceptance E VU,NR role OT, reason for consult, noted deficits, goals setting, wx safety MARC 10/03/18 0811    Family Done Acceptance E VU,NR role OT, reason for consult, noted deficits, goals setting, wx safety MARC 10/03/18 0811          Point: Precautions (Done)     Description: Instruct learner(s) on prescribed precautions during self-care and functional transfers.   Learning Progress Summary     Learner Status Readiness Method Response Comment Documented by    Patient Done Acceptance E VU,NR role OT, reason for consult, noted deficits, goals setting, wx safety MARC 10/03/18 0811    Family Done Acceptance E VU,NR role OT, reason for consult, noted deficits, goals setting, wx safety MARC 10/03/18 0811                      User Key     Initials Effective Dates Name Provider Type Discipline     06/08/18 -  Genny Gonzalez, OT Occupational Therapist OT                  OT Recommendation and Plan  Outcome Summary/Treatment Plan (OT)  Anticipated Equipment Needs at Discharge (OT): front wheeled walker (pending progress)  Anticipated  Discharge Disposition (OT): home with assist  Planned Therapy Interventions (OT Eval): activity tolerance training, ROM/therapeutic exercise, patient/caregiver education/training, functional balance retraining  Therapy Frequency (OT Eval): daily  Plan of Care Review  Plan of Care Reviewed With: spouse, patient  Plan of Care Reviewed With: spouse, patient  Outcome Summary: OT evaluation completed.  Pt. demostrating good UE strength, but deficit with balance and activity tolerance impacting ADL independence.  Skilled OT services appropriate to address deficit areas and assist in return to PLOF.  Liklely home with family assist .  May need wx at discharge.          Outcome Measures     Row Name 10/03/18 0811             How much help from another is currently needed...    Putting on and taking off regular lower body clothing? 3  -MARC      Bathing (including washing, rinsing, and drying) 3  -MARC      Toileting (which includes using toilet bed pan or urinal) 3  -MARC      Putting on and taking off regular upper body clothing 4  -MARC      Taking care of personal grooming (such as brushing teeth) 3  -MARC      Eating meals 4  -MARC      Score 20  -MARC         Functional Assessment    Outcome Measure Options AM-PAC 6 Clicks Daily Activity (OT)  -MARC        User Key  (r) = Recorded By, (t) = Taken By, (c) = Cosigned By    Initials Name Provider Type    Genny Ramírez OT Occupational Therapist          Time Calculation:         Time Calculation- OT     Row Name 10/03/18 0811             Time Calculation- OT    OT Start Time 0811  -MARC      OT Received On 10/03/18  -MARC      OT Goal Re-Cert Due Date 10/13/18  -MARC        User Key  (r) = Recorded By, (t) = Taken By, (c) = Cosigned By    Initials Name Provider Type    Genny Ramírez OT Occupational Therapist        Therapy Suggested Charges     Code   Minutes Charges    None           Therapy Charges for Today     Code Description Service Date Service Provider Modifiers Qty     [id number]  OT EVAL LOW COMPLEXITY 3 10/3/2018 Genny Gonzalez, OT GO 1               Genny Gonzalez OT  10/3/2018

## 2018-10-03 NOTE — PROGRESS NOTES
[facility] Progress Note        Antibiotics:  Anti-Infectives     Ordered     Dose/Rate Route Frequency Start Stop    10/01/18 0420  meropenem (MERREM) 1 g/100 mL 0.9% NS VTB (mbp)     Ordering Provider:  Milana Villeda, DO    1 g  over 3 Hours Intravenous Every 8 Hours 10/01/18 1200 10/08/18 1159    10/01/18 0420  meropenem (MERREM) 1 g/100 mL 0.9% NS VTB (mbp)     Ordering Provider:  Milana Villeda, DO    1 g  over 30 Minutes Intravenous Once 10/01/18 0515 10/01/18 0549          CC: abd pain    HPI:  Patient is a 68 y.o.  Yr old male with history of biliary tract obstruction, admitted to [facility] multiple times in the last 2 months including August 10 until August 16, underwent cholecystectomy with pathology suggesting chronic cholecystitis, had ERCP on August 14 with biliary stricture/proximal duct dilation and had stent placement.  He was readmitted August 21, 2018 until August 25, 2018 with acute sepsis, Klebsiella bacteremia, discharged with oral antibiotics.  Readmitted September 12, 2018 until September 17, 2018 with ESBL Escherichia coli bacteremia seen by infectious disease in [place] and treated with Invanz, duration of therapy unclear but approximately until September 24.  During that period of time, concern for malignancy mentioned in notes and referred to [facility] for further biopsy, done September 28.  Presented once again to [facility] emergency room September 30 with acute onset abdominal pain/nausea/vomiting, delirium and fever to 103/ tachy to 115.  Blood cultures positive again with initial PCR Klebsiella species and no carbopenem resistance per micro there by PCR.  Transferred to [facility].    10/1/18 ERCP with evidence for obstruction/purulence and new stent placed; diarrhea develops and C. difficile PCR ordered     10/3/18 He reports less intense abdominal pain, diffuse, nonradiating, worse with palpation, better with pain meds, 4 out of 10 and  "associated with jaundice.  Recent nausea/vomiting but not today.   diarrhea less but he is unable to quantify.  No hematochezia melena or hematemesis.     No headache photophobia or neck stiffness.  No shortness of breath cough or hemoptysis.  No dysuria hematuria or pyuria.  No other new skin rash or exposure.    ROS:      10/3/18 No f/c/s. No n/v. No rash. No new ADR to Abx.     Constitutional-- No Fever, chills or sweats.  Appetite diminished with generalized malaise and fatigue  Heent-- No new vision, hearing or throat complaints.  No epistaxis or oral sores.  Denies odynophagia or dysphagia.  No flashers, floaters or eye pain. No odynophagia or dysphagia. No headache, photophobia or neck stiffness.  CV-- No chest pain, palpitation or syncope  Resp-- No SOB/cough/Hemoptysis  GI- No nausea, vomiting.  No hematochezia, melena, or hematemesis. Denies jaundice or chronic liver disease.  -- No dysuria, hematuria, or flank pain.  Denies hesitancy, urgency or flank pain.  Lymph- no swollen lymph nodes in neck/axilla or groin.   Heme- No active bruising or bleeding; no Hx of DVT or PE.  MS-- no swelling or pain in the bones or joints of arms/legs.  No new back pain.  Neuro-- No acute focal weakness or numbness in the arms or legs.  No seizures.    Full 12 point review of systems reviewed and negative otherwise for acute complaints, aside from above      PE:   /91   Pulse 73   Temp 98.3 °F (36.8 °C) (Oral)   Resp 18   Ht 182.9 cm (72\")   Wt 103 kg (227 lb 12.8 oz)   SpO2 95%   BMI 30.90 kg/m²     GENERAL: Awake and alert, in no acute distress. RA  HEENT: Normocephalic, atraumatic.  PERRL. EOMI. No conjunctival injection. +icterus. Oropharynx clear without evidence of thrush or exudate. No evidence of peridontal disease.    NECK: Supple without nuchal rigidity. No mass.  LYMPH: No cervical, axillary or inguinal lymphadenopathy.  HEART: RRR; No murmur, rubs, gallops.   LUNGS: Clear to auscultation " bilaterally without wheezing, rales, rhonchi. Normal respiratory effort. Nonlabored. No dullness.  ABDOMEN: Soft, tender, nondistended. Positive bowel sounds. No rebound or guarding. NO mass or HSM.  EXT:  No cyanosis, clubbing or edema. No cord.  : Genitalia generally unremarkable.  Without Hassan catheter.  MSK: FROM without joint effusions noted arms/legs.    SKIN: Warm and dry without cutaneous eruptions on Inspection/palpation.    NEURO: Oriented to PPT. No focal deficits on motor/sensory exam at arms/legs.  PSYCHIATRIC: Normal insight and judgement. Cooperative with PE     No peripheral stigmata/phenomena of endocarditis    Laboratory Data      Results from last 7 days  Lab Units 10/03/18  0634 10/02/18  0552 10/01/18  0459   WBC 10*3/mm3 5.28 7.37 10.17   HEMOGLOBIN g/dL 10.6* 11.1* 11.2*   HEMATOCRIT % 31.8* 33.6* 33.6*   PLATELETS 10*3/mm3 189 173 190       Results from last 7 days  Lab Units 10/03/18  0634   SODIUM mmol/L 138   POTASSIUM mmol/L 3.9   CHLORIDE mmol/L 108   CO2 mmol/L 24.0   BUN mg/dL 23   CREATININE mg/dL 1.03   GLUCOSE mg/dL 88   CALCIUM mg/dL 8.0*       Results from last 7 days  Lab Units 10/03/18  0634   ALK PHOS U/L 304*   BILIRUBIN mg/dL 2.3*   ALT (SGPT) U/L 142*   AST (SGOT) U/L 114*               Estimated Creatinine Clearance: 82.9 mL/min (by C-G formula based on SCr of 1.03 mg/dL).      Microbiology:      Radiology:  Imaging Results (last 72 hours)     Procedure Component Value Units Date/Time    FL ERCP pancreatic and biliary ducts [[id number]] Collected:  10/02/18 0947     Updated:  10/02/18 1029    Narrative:       EXAMINATION: FLUOROSCOPY ERCP PANCREATIC AND BILIARY DUCTS-      INDICATION: ERCP ; A41.9-Sepsis, unspecified organism; R50.9-Fever,  unspecified; K83.1-Obstruction of bile duct.      COMPARISON: None.     FINDINGS: Intraoperative fluoroscopy during ERCP with stent placement.  Total fluoroscopic time usage 3 minutes 15 seconds and five  representative images saved.         Impression:       Intraoperative fluoroscopy utilized during ERCP with stent  placement. Please see ERCP procedure report for full details.     D:  10/02/2018  E:  10/02/2018     This report was finalized on 10/2/2018 10:27 AM by Dr. Luis A Oneal.               Impression:     --Acute severe sepsis, febrile/tachycardic with confusion and acute kidney injury at presentation to [facility] September 30.  Sepsis parameters improved with better hemodynamics, fever decreased and creatinine improved.  Intra-abdominal source is primary concern.  Broad-spectrum antimicrobials until further data.  Gram-negative kassie in blood culture at [facility]     --Acute gram-negative kassie septicemia.  History as outlined above with Klebsiella in August, ESBL Escherichia coli in September and gram-negative kassie on this admission with preliminary PCR Klebsiella and no carbopenem resistance per micro at [place] (I d/w them directly)     --Acute/recurrent cholangitis with prior cholecystectomy, pathology showing chronic cholecystitis requiring postoperative ERCP and obstruction noted, status post stent in August and abnormal liver function tests, cholestasis and abdominal pain with jaundice and possible malignancy.   had ERCP/new stent October 1 with evidence for purulence/obstruction.   Surgery/GI to help define option timing and threshold for further surgery/intervention.  Pathology pending regarding recent biopsies.  Patient understands high risk for further infection, potential progressive sepsis and other serious sequela/mortality etc.      --Acute kidney injury.  Creatinine trending down with resuscitation.  Likely prerenal associated with sepsis     --Acute encephalopathy, improved per family and likely metabolic associated with sepsis     --Chronic kidney disease stage III per records.  Monitor to help guide further adjustments in antimicrobials     --COPD per notes     --Diabetes type 2.    PLAN:     --IV  Merrem     --I discussed potential risks and benefits of the prescribed antibiotics that include, but are not limited to, solid organ toxicity, neuro toxicity, renal toxicity, CDiff, cytopenias, hypersensitivity,etc.. Patient/Family voice understanding and agree to proceed.     --Check/review labs cultures and scans      --History per nursing and family     --Discussed with microbiology    --d/w Dr Jimenez     --Highly complex set of issues with high risk for further serious morbidity and other serious sequela    Scot Higgins MD  10/3/2018

## 2018-10-04 ENCOUNTER — TRANSCRIBE ORDERS (OUTPATIENT)
Dept: INFUSION THERAPY | Facility: HOSPITAL | Age: 68
End: 2018-10-04

## 2018-10-04 VITALS
DIASTOLIC BLOOD PRESSURE: 93 MMHG | HEIGHT: 72 IN | HEART RATE: 57 BPM | RESPIRATION RATE: 18 BRPM | TEMPERATURE: 98.7 F | OXYGEN SATURATION: 95 % | WEIGHT: 228 LBS | SYSTOLIC BLOOD PRESSURE: 153 MMHG | BODY MASS INDEX: 30.88 KG/M2

## 2018-10-04 DIAGNOSIS — A41.9 SEPSIS, DUE TO UNSPECIFIED ORGANISM: Primary | ICD-10-CM

## 2018-10-04 DIAGNOSIS — A41.59 ENTEROBACTER SEPSIS (HCC): Primary | ICD-10-CM

## 2018-10-04 PROBLEM — R50.9 FEVER: Status: RESOLVED | Noted: 2018-10-01 | Resolved: 2018-10-04

## 2018-10-04 PROBLEM — R19.7 NAUSEA VOMITING AND DIARRHEA: Status: RESOLVED | Noted: 2018-10-01 | Resolved: 2018-10-04

## 2018-10-04 PROBLEM — R10.9 ABDOMINAL PAIN: Status: RESOLVED | Noted: 2018-10-01 | Resolved: 2018-10-04

## 2018-10-04 PROBLEM — R11.2 NAUSEA VOMITING AND DIARRHEA: Status: RESOLVED | Noted: 2018-10-01 | Resolved: 2018-10-04

## 2018-10-04 LAB
ALBUMIN SERPL-MCNC: 2.83 G/DL (ref 3.2–4.8)
ALBUMIN/GLOB SERPL: 1.5 G/DL (ref 1.5–2.5)
ALP SERPL-CCNC: 307 U/L (ref 25–100)
ALT SERPL W P-5'-P-CCNC: 104 U/L (ref 7–40)
ANION GAP SERPL CALCULATED.3IONS-SCNC: 9 MMOL/L (ref 3–11)
AST SERPL-CCNC: 75 U/L (ref 0–33)
BILIRUB SERPL-MCNC: 1.5 MG/DL (ref 0.3–1.2)
BUN BLD-MCNC: 16 MG/DL (ref 9–23)
BUN/CREAT SERPL: 14.4 (ref 7–25)
CALCIUM SPEC-SCNC: 8.1 MG/DL (ref 8.7–10.4)
CHLORIDE SERPL-SCNC: 107 MMOL/L (ref 99–109)
CO2 SERPL-SCNC: 24 MMOL/L (ref 20–31)
CREAT BLD-MCNC: 1.11 MG/DL (ref 0.6–1.3)
DEPRECATED RDW RBC AUTO: 45.7 FL (ref 37–54)
ERYTHROCYTE [DISTWIDTH] IN BLOOD BY AUTOMATED COUNT: 14.3 % (ref 11.3–14.5)
GFR SERPL CREATININE-BSD FRML MDRD: 65 ML/MIN/1.73
GLOBULIN UR ELPH-MCNC: 1.9 GM/DL
GLUCOSE BLD-MCNC: 95 MG/DL (ref 70–100)
GLUCOSE BLDC GLUCOMTR-MCNC: 128 MG/DL (ref 70–130)
GLUCOSE BLDC GLUCOMTR-MCNC: 95 MG/DL (ref 70–130)
HCT VFR BLD AUTO: 29.8 % (ref 38.9–50.9)
HGB BLD-MCNC: 10 G/DL (ref 13.1–17.5)
MAGNESIUM SERPL-MCNC: 1.5 MG/DL (ref 1.3–2.7)
MCH RBC QN AUTO: 29.3 PG (ref 27–31)
MCHC RBC AUTO-ENTMCNC: 33.6 G/DL (ref 32–36)
MCV RBC AUTO: 87.4 FL (ref 80–99)
PLATELET # BLD AUTO: 188 10*3/MM3 (ref 150–450)
PMV BLD AUTO: 10.5 FL (ref 6–12)
POTASSIUM BLD-SCNC: 3.7 MMOL/L (ref 3.5–5.5)
PROT SERPL-MCNC: 4.7 G/DL (ref 5.7–8.2)
RBC # BLD AUTO: 3.41 10*6/MM3 (ref 4.2–5.76)
SODIUM BLD-SCNC: 140 MMOL/L (ref 132–146)
WBC NRBC COR # BLD: 4.84 10*3/MM3 (ref 3.5–10.8)

## 2018-10-04 PROCEDURE — 25010000003 CEFTRIAXONE PER 250 MG: Performed by: INTERNAL MEDICINE

## 2018-10-04 PROCEDURE — 25010000002 MEROPENEM: Performed by: INTERNAL MEDICINE

## 2018-10-04 PROCEDURE — 25010000002 ONDANSETRON PER 1 MG: Performed by: INTERNAL MEDICINE

## 2018-10-04 PROCEDURE — 85027 COMPLETE CBC AUTOMATED: CPT | Performed by: INTERNAL MEDICINE

## 2018-10-04 PROCEDURE — 82962 GLUCOSE BLOOD TEST: CPT

## 2018-10-04 PROCEDURE — 99239 HOSP IP/OBS DSCHRG MGMT >30: CPT | Performed by: HOSPITALIST

## 2018-10-04 PROCEDURE — 25010000002 HEPARIN (PORCINE) PER 1000 UNITS: Performed by: INTERNAL MEDICINE

## 2018-10-04 PROCEDURE — 94640 AIRWAY INHALATION TREATMENT: CPT

## 2018-10-04 PROCEDURE — 99231 SBSQ HOSP IP/OBS SF/LOW 25: CPT | Performed by: INTERNAL MEDICINE

## 2018-10-04 PROCEDURE — 83735 ASSAY OF MAGNESIUM: CPT

## 2018-10-04 PROCEDURE — 80053 COMPREHEN METABOLIC PANEL: CPT | Performed by: INTERNAL MEDICINE

## 2018-10-04 RX ORDER — CEFTRIAXONE SODIUM 2 G/50ML
2 INJECTION, SOLUTION INTRAVENOUS EVERY 24 HOURS
Status: DISCONTINUED | OUTPATIENT
Start: 2018-10-06 | End: 2018-10-04 | Stop reason: HOSPADM

## 2018-10-04 RX ORDER — METRONIDAZOLE 500 MG/1
500 TABLET ORAL EVERY 8 HOURS SCHEDULED
Qty: 21 TABLET | Refills: 0 | Status: SHIPPED | OUTPATIENT
Start: 2018-10-04 | End: 2018-10-11

## 2018-10-04 RX ORDER — CEFTRIAXONE SODIUM 2 G/50ML
INJECTION, SOLUTION INTRAVENOUS
Refills: 0
Start: 2018-10-04 | End: 2018-10-11

## 2018-10-04 RX ORDER — CEFTRIAXONE 1 G/1
2 INJECTION, POWDER, FOR SOLUTION INTRAMUSCULAR; INTRAVENOUS EVERY 24 HOURS
Start: 2018-10-04 | End: 2018-10-04 | Stop reason: HOSPADM

## 2018-10-04 RX ORDER — METRONIDAZOLE 500 MG/1
500 TABLET ORAL EVERY 8 HOURS SCHEDULED
Status: DISCONTINUED | OUTPATIENT
Start: 2018-10-04 | End: 2018-10-04 | Stop reason: HOSPADM

## 2018-10-04 RX ORDER — CEFTRIAXONE SODIUM 2 G/50ML
2 INJECTION, SOLUTION INTRAVENOUS EVERY 24 HOURS
Status: DISCONTINUED | OUTPATIENT
Start: 2018-10-04 | End: 2018-10-04 | Stop reason: HOSPADM

## 2018-10-04 RX ADMIN — CEFTRIAXONE SODIUM 2 G: 2 INJECTION, SOLUTION INTRAVENOUS at 11:08

## 2018-10-04 RX ADMIN — ONDANSETRON 4 MG: 2 INJECTION INTRAMUSCULAR; INTRAVENOUS at 09:12

## 2018-10-04 RX ADMIN — MEROPENEM 1 G: 1 INJECTION, POWDER, FOR SOLUTION INTRAVENOUS at 04:06

## 2018-10-04 RX ADMIN — METOPROLOL TARTRATE 25 MG: 25 TABLET ORAL at 09:15

## 2018-10-04 RX ADMIN — ALBUTEROL SULFATE 2.5 MG: 2.5 SOLUTION RESPIRATORY (INHALATION) at 08:22

## 2018-10-04 RX ADMIN — PANTOPRAZOLE SODIUM 40 MG: 40 TABLET, DELAYED RELEASE ORAL at 05:15

## 2018-10-04 RX ADMIN — MORPHINE SULFATE 30 MG: 30 TABLET, EXTENDED RELEASE ORAL at 09:16

## 2018-10-04 RX ADMIN — METRONIDAZOLE 500 MG: 500 TABLET ORAL at 16:48

## 2018-10-04 RX ADMIN — FLECAINIDE ACETATE 50 MG: 50 TABLET ORAL at 09:15

## 2018-10-04 RX ADMIN — APIXABAN 5 MG: 5 TABLET, FILM COATED ORAL at 09:15

## 2018-10-04 RX ADMIN — FLUOXETINE HYDROCHLORIDE 40 MG: 20 CAPSULE ORAL at 09:15

## 2018-10-04 RX ADMIN — METRONIDAZOLE 500 MG: 500 TABLET ORAL at 09:15

## 2018-10-04 RX ADMIN — SODIUM CHLORIDE 150 ML/HR: 900 INJECTION INTRAVENOUS at 09:04

## 2018-10-04 RX ADMIN — BUDESONIDE 0.5 MG: 0.5 INHALANT RESPIRATORY (INHALATION) at 08:15

## 2018-10-04 RX ADMIN — HEPARIN SODIUM 5000 UNITS: 5000 INJECTION, SOLUTION INTRAVENOUS; SUBCUTANEOUS at 05:15

## 2018-10-04 RX ADMIN — SODIUM CHLORIDE 150 ML/HR: 900 INJECTION INTRAVENOUS at 02:45

## 2018-10-04 RX ADMIN — COLCHICINE 0.6 MG: 0.6 TABLET, FILM COATED ORAL at 09:15

## 2018-10-04 NOTE — PROGRESS NOTES
Continued Stay Note  [facility]     Patient Name: Todd Phillips  MRN: [id number]  Today's Date: 10/4/2018    Admit Date: 10/1/2018          Discharge Plan     Row Name 10/04/18 1401       Plan    Plan update    Patient/Family in Agreement with Plan yes    Plan Comments Per CM consult spoke with patient regarding place that he had infusions in the past who reports that he had infusions before at [facility] in the Infusion Center.  Called the infusion center [phone] and spoke to Aletha who advises that they will need an order from a physican who has privledges at their hospital such as the patients PCP.  Called patient PCP office, Adiel Lima MD, and spoke to Nicole who request to fax order, discharge summary and last ID note to their office at [phone].  Awaiting callback from office to confirm.  Patient requesting Rolling Walker for home and would like to have a Walker brougn to the room prior to discharge and opted to use [facility].  Called Giorgio with [facility] who will bring a Rolling Walker to the room.   Discussed with patient and wife whether or not he would need HH services but at this time he does not feel he needs PT/OT to see him.  No other discharge needs noted.  Patient plan is to discharge home with outpatient infusion services via car with family to transport.      Final Discharge Disposition Code 01 - home or self-care              Discharge Codes    No documentation.       Expected Discharge Date and Time     Expected Discharge Date Expected Discharge Time    Oct 4, 2018             Aisha Quispe RN

## 2018-10-04 NOTE — DISCHARGE SUMMARY
[facility] Medicine Services  DISCHARGE SUMMARY    Patient Name: Todd Phillips  : 1950  MRN: [id number]    Date of Admission: 10/1/2018  Date of Discharge:  10/04/18  Primary Care Physician: Adiel Denney MD    Consults     Date and Time Order Name Status Description    10/1/2018 0420 Inpatient Infectious Diseases Consult Completed     10/1/2018 0420 Inpatient Gastroenterology Consult      2018 1746 Inpatient Infectious Diseases Consult Completed         Hospital Course     Presenting Problem:   Sepsis (CMS/HCC) [A41.9]  Sepsis (CMS/HCC) [A41.9]    Active Hospital Problems    Diagnosis Date Noted   • **Sepsis (CMS/HCC) [A41.9] 10/01/2018   • Fever [R50.9] 10/01/2018   • Abdominal pain [R10.9] 10/01/2018   • Nausea vomiting and diarrhea [R11.2, R19.7] 10/01/2018   • Sepsis (CMS/HCC) [A41.9] 10/01/2018   • Biliary obstruction [K83.1] 2018   • Malignant neoplasm of head of pancreas (CMS/HCC) [C25.0] 2018   • Paroxysmal atrial fibrillation [I48.91] 10/22/2017   • Nonobstructive atherosclerosis of coronary artery [I25.10] 10/22/2017   • DM2 (diabetes mellitus, type 2) (CMS/HCC) [E11.9] 10/22/2017   • CKD stage 3 secondary to diabetes (CMS/HCC) [E11.22, N18.3] 10/22/2017   • Hyperlipidemia [E78.5] 2016   • Essential hypertension [I10] 2016   • COPD (chronic obstructive pulmonary disease) (CMS/HCC) [J44.9] 2016      Resolved Hospital Problems    Diagnosis Date Noted Date Resolved   No resolved problems to display.          Hospital Course:  Todd Phillips is a 68 y.o. male with hx  Afib, COPD, and recent EUS with biopsy due to finding of a pancreatic head mass, presents due to high fever, worsening abdominal pain, nausea, and vomiting. Initially went to [facility], where he was febrile to 103. Transferred to [facility]. Of note, patient had CCY on 8/10/18, followed by ERCP and biliary stent placement on 18 in [place], after which CT abd w/contrast was  obtained concerning for mass at head of pancreas. He has also had 2 separate occasions where he was hospitalized for ESBL E.coli and Klebsiella bacteremia, for which he just completed a course of Invanz on 9/24. Patient admitted to hospitalists. Dr. Luna with GI was consulted. An ERCP was performed on 10/2/18 and noted biliary stent occlusion due to mass effect. A metal stent was placed with expression of purulent material. Pain and LFT's improved after this. Blood culture grew Klebsiella. Infectious Disease continued on Merrem. Based on sensitivities, he will be transitioned to PO Flagyl + IV Rocephin at discharge. Dr. Higgins will see him in the office tomorrow.  arranging for outpatient ATBx. Pathology appears consistent with adenocarcinoma. He will follow up with Oncology in [place] next week as well as Dr. Brody (Surgery).       Discharge Follow Up Recommendations for labs/diagnostics:  F/U with Dr. Higgins/ID tomorrow as scheduled  F/U with PCP in 1 week  F/U with [facility]cology in 1 week  F/U with Dr. Brody in November as already scheduled    Day of Discharge     HPI:   Patient seen this afternoon. No complaints.     Review of Systems  Gen-no fevers, no chills  CV-no chest pain, no palpitations  Resp-no cough, no dyspnea  GI-no N/V/D, no abd pain    Otherwise ROS is negative except as mentioned in the HPI.    Vital Signs:   Temp:  [97.3 °F (36.3 °C)-98.8 °F (37.1 °C)] 98.7 °F (37.1 °C)  Heart Rate:  [57-73] 57  Resp:  [16-18] 18  BP: (139-161)/(84-96) 153/93     Physical Exam:  Gen-no acute distress  HENT-NCAT, mucous membranes moist  CV-RRR, S1 S2 normal, no m/r/g  Resp-CTAB, no wheezes or rales  Abd-soft, NT, ND, +BS  Ext-no edema  Neuro-A&Ox3, no focal deficits  Skin-no rashes  Psych-appropriate mood        Pertinent  and/or Most Recent Results       Results from last 7 days  Lab Units 10/04/18  0608 10/03/18  0634 10/02/18  0552 10/01/18  0459 09/30/18  1709   WBC 10*3/mm3 4.84  5.28 7.37 10.17 5.57   HEMOGLOBIN g/dL 10.0* 10.6* 11.1* 11.2* 12.5*   HEMATOCRIT % 29.8* 31.8* 33.6* 33.6* 37.3*   PLATELETS 10*3/mm3 188 189 173 190 224   SODIUM mmol/L 140 138 137 135 137   POTASSIUM mmol/L 3.7 3.9 4.3 3.9 4.1   CHLORIDE mmol/L 107 108 104 105 104   CO2 mmol/L 24.0 24.0 26.0 24.0 21.8*   BUN mg/dL 16 23 26* 17 21   CREATININE mg/dL 1.11 1.03 1.33* 1.22 1.31*   GLUCOSE mg/dL 95 88 98 167* 192*   CALCIUM mg/dL 8.1* 8.0* 8.4* 8.3* 9.5       Results from last 7 days  Lab Units 10/04/18  0608 10/03/18  0634 10/02/18  0552 10/01/18  0459 09/30/18  1709   BILIRUBIN mg/dL 1.5* 2.3* 4.9* 4.4* 3.2*   ALK PHOS U/L 307* 304* 291* 352* 475*   ALT (SGPT) U/L 104* 142* 176* 202* 225*   AST (SGOT) U/L 75* 114* 170* 190* 235*   PROTIME Seconds  --   --   --   --  13.3   INR   --   --   --   --  0.99   APTT seconds  --   --   --   --  24.2           Invalid input(s): TG, LDLCALC, LDLREALC    Results from last 7 days  Lab Units 09/30/18  1910 09/30/18  1709   TSH mIU/mL  --  0.621   TROPONIN I ng/mL 0.007 <0.006     Brief Urine Lab Results  (Last result in the past 365 days)      Color   Clarity   Blood   Leuk Est   Nitrite   Protein   CREAT   Urine HCG        09/30/18 1837 Dark Yellow(A) Clear Negative Trace(A) Negative Negative               Microbiology Results Abnormal     Procedure Component Value - Date/Time    Blood Culture - Blood, [[id number]]  (Normal) Collected:  10/01/18 0459    Lab Status:  Preliminary result Specimen:  Blood from Arm, Left Updated:  10/04/18 0530     Blood Culture No growth at 3 days    Blood Culture - Blood, [[id number]]  (Normal) Collected:  10/01/18 0459    Lab Status:  Preliminary result Specimen:  Blood from Arm, Right Updated:  10/04/18 0530     Blood Culture No growth at 3 days          Imaging Results (all)     Procedure Component Value Units Date/Time    FL ERCP pancreatic and biliary ducts [[id number]] Collected:  10/02/18 0947     Updated:  10/02/18 1029    Narrative:        EXAMINATION: FLUOROSCOPY ERCP PANCREATIC AND BILIARY DUCTS-      INDICATION: ERCP ; A41.9-Sepsis, unspecified organism; R50.9-Fever,  unspecified; K83.1-Obstruction of bile duct.      COMPARISON: None.     FINDINGS: Intraoperative fluoroscopy during ERCP with stent placement.  Total fluoroscopic time usage 3 minutes 15 seconds and five  representative images saved.        Impression:       Intraoperative fluoroscopy utilized during ERCP with stent  placement. Please see ERCP procedure report for full details.     D:  10/02/2018  E:  10/02/2018     This report was finalized on 10/2/2018 10:27 AM by Dr. Luis A Oneal.                            Order Current Status    Blood Culture - Blood, Preliminary result    Blood Culture - Blood, Preliminary result        Discharge Details        Discharge Medications      New Medications      Instructions Start Date   cefTRIAXone 1 g injection  Commonly known as:  ROCEPHIN   2 g, Intravenous, Every 24 Hours      metroNIDAZOLE 500 MG tablet  Commonly known as:  FLAGYL   500 mg, Oral, Every 8 Hours Scheduled         Continue These Medications      Instructions Start Date   albuterol 108 (90 Base) MCG/ACT inhaler  Commonly known as:  PROVENTIL HFA;VENTOLIN HFA   2 puffs, Inhalation, Every 4 Hours PRN      apixaban 5 MG tablet tablet  Commonly known as:  ELIQUIS   5 mg, Oral, Every 12 Hours Scheduled      cholecalciferol 1000 units tablet  Commonly known as:  VITAMIN D3   1,000 Units, Oral, Daily      colchicine 0.6 MG tablet   0.6 mg, Oral, Daily      flecainide 50 MG tablet  Commonly known as:  TAMBOCOR   50 mg, Oral, Every 12 Hours Scheduled      FLUoxetine 20 MG capsule  Commonly known as:  PROzac   40 mg, Oral, Every Morning      FLUoxetine 20 MG capsule  Commonly known as:  PROzac   20 mg, Oral, Nightly      lansoprazole 30 MG capsule  Commonly known as:  PREVACID   30 mg, Oral, Daily      metoprolol tartrate 50 MG tablet  Commonly known as:  LOPRESSOR   25 mg, Oral, Daily       mometasone 220 MCG/INH inhaler  Commonly known as:  ASMANEX   2 puffs, Inhalation, Nightly      montelukast 10 MG tablet  Commonly known as:  SINGULAIR   10 mg, Oral, Daily      Morphine 30 MG 12 hr tablet  Commonly known as:  MS CONTIN   30 mg, Oral, Every 12 Hours Scheduled      nitroglycerin 0.4 MG SL tablet  Commonly known as:  NITROSTAT   0.4 mg, Sublingual, As Needed      ondansetron ODT 4 MG disintegrating tablet  Commonly known as:  ZOFRAN-ODT   4 mg, Oral, Every 6 Hours PRN      oxyCODONE 5 MG immediate release tablet  Commonly known as:  ROXICODONE   Take 1-3 tabs every 4 hours as needed for pain      pioglitazone 30 MG tablet  Commonly known as:  ACTOS   30 mg, Oral, Daily      polyethylene glycol packet  Commonly known as:  MIRALAX   17 g, Oral, Daily PRN      sore muscle 10-30 % cream cream   1 application, Topical, 3 Times Daily PRN      tiotropium bromide-olodaterol 2.5-2.5 MCG/ACT aerosol solution inhaler  Commonly known as:  STIOLTO RESPIMAT   2 puffs, Inhalation, Daily               Discharge Disposition:  Home or Self Care    Discharge Diet:  Diet Instructions     Advance Diet As Tolerated             Discharge Activity:   Activity Instructions     Activity as Tolerated               Code Status/Level of Support:  Code Status and Medical Interventions:   Ordered at: 10/01/18 0420     Level Of Support Discussed With:    Patient     Code Status:    CPR     Medical Interventions (Level of Support Prior to Arrest):    Full       Future Appointments  Date Time Provider Department Center   10/22/2018 2:30 PM Fidel Lozano DO [facility] None   12/26/2018 10:00 AM Adiel Denney MD [facility] None       Additional Instructions for the Follow-ups that You Need to Schedule     Discharge Follow-up with PCP    As directed      Currently Documented PCP:  Adiel Denney MD  PCP Phone Number:  [phone]    Follow Up Details:  1 week         Discharge Follow-up with Specialty:   Mary Grace as scheduled    As directed      Specialty:  Dr. Brody as scheduled         Discharge Follow-up with Specified Provider: Oncology at [facility] 1 week    As directed      To:  Oncology at [facility] 1 week               Time Spent on Discharge: 40 minutes    Electronically signed by Marjorie Lechuga MD, 10/04/18, 1:26 PM

## 2018-10-04 NOTE — PROGRESS NOTES
[facility] Progress Note        Antibiotics:  Anti-Infectives     Ordered     Dose/Rate Route Frequency Start Stop    10/04/18 0913  cefTRIAXone (ROCEPHIN) IVPB 2 g     Ordering Provider:  Scot Higgins MD    2 g  100 mL/hr over 30 Minutes Intravenous Every 24 Hours 10/04/18 1200 10/18/18 1159    10/04/18 0734  metroNIDAZOLE (FLAGYL) tablet 500 mg     Ordering Provider:  Scot Higgins MD    500 mg Oral Every 8 Hours Scheduled 10/04/18 0830 10/14/18 0559    10/01/18 0420  meropenem (MERREM) 1 g/100 mL 0.9% NS VTB (mbp)     Ordering Provider:  Milana Villeda DO    1 g  over 30 Minutes Intravenous Once 10/01/18 0515 10/01/18 0549          CC: abd pain    HPI:  Patient is a 68 y.o.  Yr old male with history of biliary tract obstruction, admitted to [facility] multiple times in the last 2 months including August 10 until August 16, underwent cholecystectomy with pathology suggesting chronic cholecystitis, had ERCP on August 14 with biliary stricture/proximal duct dilation and had stent placement.  He was readmitted August 21, 2018 until August 25, 2018 with acute sepsis, Klebsiella bacteremia, discharged with oral antibiotics.  Readmitted September 12, 2018 until September 17, 2018 with ESBL Escherichia coli bacteremia seen by infectious disease in [place] and treated with Invanz, duration of therapy unclear but approximately until September 24.  During that period of time, concern for malignancy mentioned in notes and referred to [facility] for further biopsy, done September 28.  Presented once again to [facility] emergency room September 30 with acute onset abdominal pain/nausea/vomiting, delirium and fever to 103/ tachy to 115.  Blood cultures positive again with initial PCR Klebsiella species and no carbopenem resistance per micro there by PCR.  Transferred to [facility].    10/1/18 ERCP with evidence for obstruction/purulence and new stent placed; diarrhea  "develops and C. difficile PCR ordered     10/4/18 he is doing better and he would love to get home.  He reports less intense abdominal pain, diffuse, nonradiating, worse with palpation, better with pain meds, 2-4 out of 10 and associated with jaundice.  Recent nausea/vomiting but not today.   diarrhea less but he is unable to quantify.  No hematochezia melena or hematemesis.     No headache photophobia or neck stiffness.  No shortness of breath cough or hemoptysis.  No dysuria hematuria or pyuria.  No other new skin rash or exposure.    ROS:      10/4/18 No f/c/s. No n/v. No rash. No new ADR to Abx.     Constitutional-- No Fever, chills or sweats.  Appetite diminished with generalized malaise and fatigue  Heent-- No new vision, hearing or throat complaints.  No epistaxis or oral sores.  Denies odynophagia or dysphagia.  No flashers, floaters or eye pain. No odynophagia or dysphagia. No headache, photophobia or neck stiffness.  CV-- No chest pain, palpitation or syncope  Resp-- No SOB/cough/Hemoptysis  GI- No nausea, vomiting.  No hematochezia, melena, or hematemesis. Denies jaundice or chronic liver disease.  -- No dysuria, hematuria, or flank pain.  Denies hesitancy, urgency or flank pain.  Lymph- no swollen lymph nodes in neck/axilla or groin.   Heme- No active bruising or bleeding; no Hx of DVT or PE.  MS-- no swelling or pain in the bones or joints of arms/legs.  No new back pain.  Neuro-- No acute focal weakness or numbness in the arms or legs.  No seizures.    Full 12 point review of systems reviewed and negative otherwise for acute complaints, aside from above      PE:   /93   Pulse 57   Temp 98.7 °F (37.1 °C) (Oral)   Resp 18   Ht 182.9 cm (72\")   Wt 103 kg (228 lb)   SpO2 95%   BMI 30.92 kg/m²     GENERAL: Awake and alert, in no acute distress. RA  HEENT: Normocephalic, atraumatic.  PERRL. EOMI. No conjunctival injection. +icterus. Oropharynx clear without evidence of thrush or exudate. No " evidence of peridontal disease.    NECK: Supple without nuchal rigidity. No mass.  LYMPH: No cervical, axillary or inguinal lymphadenopathy.  HEART: RRR; No murmur, rubs, gallops.   LUNGS: Clear to auscultation bilaterally without wheezing, rales, rhonchi. Normal respiratory effort. Nonlabored. No dullness.  ABDOMEN: Soft, mildly tender, nondistended. Positive bowel sounds. No rebound or guarding. NO mass or HSM.  EXT:  No cyanosis, clubbing or edema. No cord.  : Genitalia generally unremarkable.  Without Hassan catheter.  MSK: FROM without joint effusions noted arms/legs.    SKIN: Warm and dry without cutaneous eruptions on Inspection/palpation.    NEURO: Oriented to PPT. No focal deficits on motor/sensory exam at arms/legs.  PSYCHIATRIC: Normal insight and judgement. Cooperative with PE     No peripheral stigmata/phenomena of endocarditis    Laboratory Data      Results from last 7 days  Lab Units 10/04/18  0608 10/03/18  0634 10/02/18  0552   WBC 10*3/mm3 4.84 5.28 7.37   HEMOGLOBIN g/dL 10.0* 10.6* 11.1*   HEMATOCRIT % 29.8* 31.8* 33.6*   PLATELETS 10*3/mm3 188 189 173       Results from last 7 days  Lab Units 10/04/18  0608   SODIUM mmol/L 140   POTASSIUM mmol/L 3.7   CHLORIDE mmol/L 107   CO2 mmol/L 24.0   BUN mg/dL 16   CREATININE mg/dL 1.11   GLUCOSE mg/dL 95   CALCIUM mg/dL 8.1*       Results from last 7 days  Lab Units 10/04/18  0608   ALK PHOS U/L 307*   BILIRUBIN mg/dL 1.5*   ALT (SGPT) U/L 104*   AST (SGOT) U/L 75*               Estimated Creatinine Clearance: 76.9 mL/min (by C-G formula based on SCr of 1.11 mg/dL).      Microbiology:      Radiology:  Imaging Results (last 72 hours)     Procedure Component Value Units Date/Time    FL ERCP pancreatic and biliary ducts [[id number]] Collected:  10/02/18 0947     Updated:  10/02/18 1029    Narrative:       EXAMINATION: FLUOROSCOPY ERCP PANCREATIC AND BILIARY DUCTS-      INDICATION: ERCP ; A41.9-Sepsis, unspecified organism; R50.9-Fever,  unspecified;  K83.1-Obstruction of bile duct.      COMPARISON: None.     FINDINGS: Intraoperative fluoroscopy during ERCP with stent placement.  Total fluoroscopic time usage 3 minutes 15 seconds and five  representative images saved.        Impression:       Intraoperative fluoroscopy utilized during ERCP with stent  placement. Please see ERCP procedure report for full details.     D:  10/02/2018  E:  10/02/2018     This report was finalized on 10/2/2018 10:27 AM by Dr. Luis A Oneal.               Impression:     --Acute severe sepsis, febrile/tachycardic with confusion and acute kidney injury at presentation to [facility] September 30.  Sepsis parameters improved with better hemodynamics, fever decreased and creatinine improved.  Intra-abdominal source is primary concern.   tapered to ceftriaxone/oral Flagyl     --Acute Klebsiella septicemia.  History as outlined above with Klebsiella in August, ESBL Escherichia coli in September and Klebsiella on this admission this time as well.  Tapered to ceftriaxone     --Acute/recurrent cholangitis with prior cholecystectomy, pathology showing chronic cholecystitis requiring postoperative ERCP and obstruction noted, status post stent in August and abnormal liver function tests, cholestasis and abdominal pain with jaundice and possible malignancy.   had ERCP/new stent October 1 with evidence for purulence/obstruction.   Surgery/GI to help define option timing and threshold for further surgery/intervention.  Pathology pending regarding recent biopsies.  Patient understands high risk for further infection, potential progressive sepsis and other serious sequela/mortality etc.  tapered to ceftriaxone/oral Flagyl     --Acute kidney injury.  Creatinine trending down with resuscitation.  Likely prerenal associated with sepsis     --Acute encephalopathy, improved per family and likely metabolic associated with sepsis     --Chronic kidney disease stage III per records.  Monitor to help  guide further adjustments in antimicrobials     --COPD per notes     --Diabetes type 2.    PLAN:     --IV Rocephin/oral Flagyl     --I discussed potential risks and benefits of the prescribed antibiotics that include, but are not limited to, solid organ toxicity, neuro toxicity, renal toxicity, CDiff, cytopenias, hypersensitivity,etc.. Patient/Family voice understanding and agree to proceed.     --Check/review labs cultures and scans      --History per nursing and family     --Discussed with microbiology    --d/w Dr Jimenez     --Highly complex set of issues with high risk for further serious morbidity and other serious sequela    --Home today okay with me, IV ceftriaxone/oral Flagyl, follow-up with me in clinic October 5 at 11 AM.    Scot Higgins MD  10/4/2018

## 2018-10-04 NOTE — SIGNIFICANT NOTE
RN states wife concerned that pt doesn't seem himself tonight. More shaky.    Has bacteremia, ID following, on merrum. Also has pancreatic cancer recently dx and to start chemo soon but hasn't had port placed r/t infections. Recurrent hospitalizations - see all the ones in August, [facility], then ERCP and stent, the pancreatic cancer workup, klebsiella PNA. Also had e coli bacteremia in September. Has klebsiella bacteremia now.    VSS all stable. A&O. Assessment unchanged. NNO for now. On fluids. Merrem. Labs in AM.    RN to call back if any changes.    Full code / full support.    Certainly is ill acutely and chronically.

## 2018-10-04 NOTE — PROGRESS NOTES
"GI Daily Progress Note  Subjective:    Chief Complaint:  Fever    No fever today. No chills today. Eating well.    Objective:    /93   Pulse 57   Temp 98.7 °F (37.1 °C) (Oral)   Resp 18   Ht 182.9 cm (72\")   Wt 103 kg (228 lb)   SpO2 95%   BMI 30.92 kg/m²     Physical Exam   Constitutional: He is oriented to person, place, and time. He appears well-developed and well-nourished. No distress.   HENT:   Head: Normocephalic.   Eyes: Conjunctivae are normal. No scleral icterus.   Neck: Normal range of motion.   Cardiovascular: Normal rate and regular rhythm.    Pulmonary/Chest: Effort normal and breath sounds normal. No respiratory distress. He has no wheezes. He has no rales.   Abdominal: Soft. Bowel sounds are normal. He exhibits no distension. There is no tenderness. There is no guarding.   Genitourinary:   Genitourinary Comments: Deferred   Musculoskeletal: Normal range of motion. He exhibits no edema.   Neurological: He is alert and oriented to person, place, and time.   Skin: Skin is warm and dry. Capillary refill takes less than 2 seconds. No rash noted.   Psychiatric: He has a normal mood and affect. His behavior is normal.   Nursing note and vitals reviewed.      Lab  Lab Results   Component Value Date    WBC 4.84 10/04/2018    HGB 10.0 (L) 10/04/2018    HGB 10.6 (L) 10/03/2018    HGB 11.1 (L) 10/02/2018    MCV 87.4 10/04/2018     10/04/2018    INR 0.99 09/30/2018    INR 1.08 08/21/2018    INR 1.00 08/13/2018    INR 0.92 10/22/2017    INR <0.90 08/30/2016       Lab Results   Component Value Date    GLUCOSE 95 10/04/2018    BUN 16 10/04/2018    CREATININE 1.11 10/04/2018    EGFRIFNONA 65 10/04/2018    EGFRIFAFRI  09/02/2016      Comment:      <15 Indicative of kidney failure.    BCR 14.4 10/04/2018    CO2 24.0 10/04/2018    CALCIUM 8.1 (L) 10/04/2018    ALBUMIN 2.83 (L) 10/04/2018    ALKPHOS 307 (H) 10/04/2018    BILITOT 1.5 (H) 10/04/2018    BILIDIR 2.6 (H) 08/15/2018     (H) " 10/04/2018    AST 75 (H) 10/04/2018       Assessment:    Malignant neoplasm head of pancreas.  Biliary obstruction with ascending cholangitis, s/p wallstent.  Klebsiella bacteremia.    Plan:    Will need stent exchange about every 6 months with Dr. Luna.    Stable for d/c from GI standpoint.    Mark I. Brunner, MD  10/04/18  3:57 PM

## 2018-10-04 NOTE — PROGRESS NOTES
Continued Stay Note  [facility]     Patient Name: Todd Phillips  MRN: [id number]  Today's Date: 10/4/2018    Admit Date: 10/1/2018          Discharge Plan     Row Name 10/04/18 1456       Plan    Plan Update    Patient/Family in Agreement with Plan yes    Plan Comments Spoke with Nicole with PCP office who advises that they cannot accept the CM order even though it has the prescribed antibiotics they will require an order by the ID MD to be sent to their office detailing the medication.  Called [facility] and spoke to Phoebe who will fax an order from their office to the PCP office.   CM following.      Final Discharge Disposition Code 01 - home or self-care    Row Name 10/04/18 1406       Plan    Plan update    Patient/Family in Agreement with Plan yes    Plan Comments Per CM consult spoke with patient regarding place that he had infusions in the past who reports that he had infusions before at [facility] in the Infusion Center.  Called the infusion center [phone] and spoke to Aletha who advises that they will need an order from a physican who has privledges at their hospital such as the patients PCP.  Called patient PCP office, Adiel Lima MD, and spoke to Nicole who request to fax order, discharge summary and last ID note to their office at [phone].  Awaiting callback from office to confirm.  Patient requesting Rolling Walker for home and would like to have a Walker brougn to the room prior to discharge and opted to use [facility].  Called Jesusita with [facility] who will bring a Rolling Walker to the room.   Discussed with patient and wife whether or not he would need HH services but at this time he does not feel he needs PT/OT to see him.  No other discharge needs noted.  Patient plan is to discharge home with outpatient infusion services via car with family to transport.      Final Discharge Disposition Code 01 - home or self-care              Discharge Codes    No  documentation.       Expected Discharge Date and Time     Expected Discharge Date Expected Discharge Time    Oct 4, 2018             Aisha Quispe RN

## 2018-10-04 NOTE — PLAN OF CARE
"Problem: Patient Care Overview  Goal: Plan of Care Review  Outcome: Ongoing (interventions implemented as appropriate)   10/04/18 7058   OTHER   Outcome Summary Called to bedside by patient's wife who states that patient is \"shaking and flushed like he was when we first brought him in.\" Rn to bedside to assess, VSS, afebrile, patient with audible wheezes but clear lung sounds, O2 98% on RA. Covered patient with warm blanket and shivering subsided, patient flushed. APRN notified at wife's request with no new orders at this time. Patient currently resting in bed with eyes closed. Will continue to [facility].         "

## 2018-10-04 NOTE — DISCHARGE INSTR - APPOINTMENTS
You have an appointment with Miquel Brody MD  on November 28,2018 @ 11:15.   Call them if you have any questions. Phone: [phone]  [address]  [facility],[address]    You have an appointment with Scot Higgins MD  on October 5, 2018 @ 11:00.   Call them if you have any questions. Phone: [phone] [address]  [place] [id number]

## 2018-10-04 NOTE — PLAN OF CARE
Problem: Patient Care Overview  Goal: Individualization and Mutuality  Outcome: Outcome(s) achieved Date Met: 10/04/18    Goal: Discharge Needs Assessment  Outcome: Outcome(s) achieved Date Met: 10/04/18    Goal: Interprofessional Rounds/Family Conf  Outcome: Outcome(s) achieved Date Met: 10/04/18

## 2018-10-04 NOTE — PROGRESS NOTES
Case Management Discharge Note    Final Note: Received a call from Nicole with patient PCP office who has sent an order to Outpatient infusion for the antibiotics.  Called Outpatient infusion at [facility] and spoke to Aletha who has made an appointment for patient beginning Saturday 10/6/2018 at 1100.  Spoke with patient and wife at bedside to advise.  No further discharge needs noted.  Patient plan is to discharge home today via car with family to transport.    Destination     No service has been selected for the patient.      Durable Medical Equipment     No service has been selected for the patient.      Dialysis/Infusion     No service has been selected for the patient.      Home Medical Care     No service has been selected for the patient.      Social Care     No service has been selected for the patient.             Final Discharge Disposition Code: 01 - home or self-care

## 2018-10-05 ENCOUNTER — READMISSION MANAGEMENT (OUTPATIENT)
Dept: CALL CENTER | Facility: HOSPITAL | Age: 68
End: 2018-10-05

## 2018-10-05 ENCOUNTER — TRANSCRIBE ORDERS (OUTPATIENT)
Dept: LAB | Facility: HOSPITAL | Age: 68
End: 2018-10-05

## 2018-10-05 ENCOUNTER — LAB (OUTPATIENT)
Dept: LAB | Facility: HOSPITAL | Age: 68
End: 2018-10-05

## 2018-10-05 DIAGNOSIS — E11.8 TYPE 2 DIABETES MELLITUS WITH COMPLICATION, UNSPECIFIED WHETHER LONG TERM INSULIN USE: ICD-10-CM

## 2018-10-05 DIAGNOSIS — K83.09 CHOLANGITIS: ICD-10-CM

## 2018-10-05 DIAGNOSIS — A28.0 HEMORRHAGIC SEPTICEMIA DUE TO PASTEURELLA MULTOCIDA (HCC): ICD-10-CM

## 2018-10-05 DIAGNOSIS — A41.59 HEMORRHAGIC SEPTICEMIA DUE TO PASTEURELLA MULTOCIDA (HCC): ICD-10-CM

## 2018-10-05 DIAGNOSIS — N18.30 CHRONIC KIDNEY DISEASE, STAGE III (MODERATE) (HCC): ICD-10-CM

## 2018-10-05 DIAGNOSIS — K83.1 BILIARY OBSTRUCTION: ICD-10-CM

## 2018-10-05 DIAGNOSIS — E11.8 TYPE 2 DIABETES MELLITUS WITH COMPLICATION, UNSPECIFIED WHETHER LONG TERM INSULIN USE: Primary | ICD-10-CM

## 2018-10-05 LAB
ALBUMIN SERPL-MCNC: 3.35 G/DL (ref 3.2–4.8)
ALBUMIN/GLOB SERPL: 1.5 G/DL (ref 1.5–2.5)
ALP SERPL-CCNC: 318 U/L (ref 25–100)
ALT SERPL W P-5'-P-CCNC: 86 U/L (ref 7–40)
ANION GAP SERPL CALCULATED.3IONS-SCNC: 7 MMOL/L (ref 3–11)
AST SERPL-CCNC: 55 U/L (ref 0–33)
BASOPHILS # BLD AUTO: 0.03 10*3/MM3 (ref 0–0.2)
BASOPHILS NFR BLD AUTO: 0.5 % (ref 0–1)
BILIRUB SERPL-MCNC: 1.1 MG/DL (ref 0.3–1.2)
BUN BLD-MCNC: 15 MG/DL (ref 9–23)
BUN/CREAT SERPL: 12.9 (ref 7–25)
CALCIUM SPEC-SCNC: 8.8 MG/DL (ref 8.7–10.4)
CHLORIDE SERPL-SCNC: 103 MMOL/L (ref 99–109)
CO2 SERPL-SCNC: 29 MMOL/L (ref 20–31)
CREAT BLD-MCNC: 1.16 MG/DL (ref 0.6–1.3)
DEPRECATED RDW RBC AUTO: 45.7 FL (ref 37–54)
EOSINOPHIL # BLD AUTO: 0.14 10*3/MM3 (ref 0–0.3)
EOSINOPHIL NFR BLD AUTO: 2.2 % (ref 0–3)
ERYTHROCYTE [DISTWIDTH] IN BLOOD BY AUTOMATED COUNT: 14.2 % (ref 11.3–14.5)
GFR SERPL CREATININE-BSD FRML MDRD: 62 ML/MIN/1.73
GLOBULIN UR ELPH-MCNC: 2.3 GM/DL
GLUCOSE BLD-MCNC: 112 MG/DL (ref 70–100)
HCT VFR BLD AUTO: 34.6 % (ref 38.9–50.9)
HGB BLD-MCNC: 11.3 G/DL (ref 13.1–17.5)
IMM GRANULOCYTES # BLD: 0.24 10*3/MM3 (ref 0–0.03)
IMM GRANULOCYTES NFR BLD: 3.8 % (ref 0–0.6)
LYMPHOCYTES # BLD AUTO: 1.93 10*3/MM3 (ref 0.6–4.8)
LYMPHOCYTES NFR BLD AUTO: 30.5 % (ref 24–44)
MCH RBC QN AUTO: 28.6 PG (ref 27–31)
MCHC RBC AUTO-ENTMCNC: 32.7 G/DL (ref 32–36)
MCV RBC AUTO: 87.6 FL (ref 80–99)
MONOCYTES # BLD AUTO: 0.59 10*3/MM3 (ref 0–1)
MONOCYTES NFR BLD AUTO: 9.3 % (ref 0–12)
NEUTROPHILS # BLD AUTO: 3.64 10*3/MM3 (ref 1.5–8.3)
NEUTROPHILS NFR BLD AUTO: 57.5 % (ref 41–71)
PLATELET # BLD AUTO: 229 10*3/MM3 (ref 150–450)
PMV BLD AUTO: 10.1 FL (ref 6–12)
POTASSIUM BLD-SCNC: 4.1 MMOL/L (ref 3.5–5.5)
PROT SERPL-MCNC: 5.6 G/DL (ref 5.7–8.2)
RBC # BLD AUTO: 3.95 10*6/MM3 (ref 4.2–5.76)
SODIUM BLD-SCNC: 139 MMOL/L (ref 132–146)
WBC NRBC COR # BLD: 6.33 10*3/MM3 (ref 3.5–10.8)

## 2018-10-05 PROCEDURE — 85025 COMPLETE CBC W/AUTO DIFF WBC: CPT

## 2018-10-05 PROCEDURE — 36415 COLL VENOUS BLD VENIPUNCTURE: CPT

## 2018-10-05 PROCEDURE — 80053 COMPREHEN METABOLIC PANEL: CPT

## 2018-10-05 NOTE — OUTREACH NOTE
Prep Survey      Responses   Facility patient discharged from?  [place]   Is patient eligible?  Yes   Discharge diagnosis  Sepsis,       Does the patient have one of the following disease processes/diagnoses(primary or secondary)?  Sepsis   Does the patient have Home health ordered?  No   What is the Home health agency?   [facility]   Is there a DME ordered?  No   Prep survey completed?  Yes          Daisy Pena RN

## 2018-10-06 ENCOUNTER — HOSPITAL ENCOUNTER (OUTPATIENT)
Dept: INFUSION THERAPY | Facility: HOSPITAL | Age: 68
Setting detail: INFUSION SERIES
Discharge: HOME | End: 2018-10-06
Attending: INTERNAL MEDICINE

## 2018-10-06 VITALS
HEART RATE: 88 BPM | TEMPERATURE: 98.1 F | DIASTOLIC BLOOD PRESSURE: 74 MMHG | RESPIRATION RATE: 18 BRPM | SYSTOLIC BLOOD PRESSURE: 117 MMHG

## 2018-10-06 DIAGNOSIS — B96.20 BACTEREMIA DUE TO ESCHERICHIA COLI: ICD-10-CM

## 2018-10-06 DIAGNOSIS — R78.81 BACTEREMIA DUE TO ESCHERICHIA COLI: ICD-10-CM

## 2018-10-06 LAB
BACTERIA SPEC AEROBE CULT: NORMAL
BACTERIA SPEC AEROBE CULT: NORMAL

## 2018-10-06 PROCEDURE — 25010000002 CEFTRIAXONE: Performed by: INTERNAL MEDICINE

## 2018-10-06 PROCEDURE — 96365 THER/PROPH/DIAG IV INF INIT: CPT

## 2018-10-06 RX ADMIN — CEFTRIAXONE 2 G: 2 INJECTION, POWDER, FOR SOLUTION INTRAMUSCULAR; INTRAVENOUS at 11:13

## 2018-10-07 DIAGNOSIS — C25.0 MALIGNANT NEOPLASM OF HEAD OF PANCREAS (HCC): ICD-10-CM

## 2018-10-07 RX ORDER — PACLITAXEL 100 MG/20ML
125 INJECTION, POWDER, LYOPHILIZED, FOR SUSPENSION INTRAVENOUS ONCE
Status: CANCELLED | OUTPATIENT
Start: 2018-11-13

## 2018-10-07 RX ORDER — SODIUM CHLORIDE 9 MG/ML
250 INJECTION, SOLUTION INTRAVENOUS ONCE
Status: CANCELLED | OUTPATIENT
Start: 2018-10-23 | End: 2018-10-23

## 2018-10-07 RX ORDER — PACLITAXEL 100 MG/20ML
125 INJECTION, POWDER, LYOPHILIZED, FOR SUSPENSION INTRAVENOUS ONCE
Status: CANCELLED | OUTPATIENT
Start: 2018-10-23

## 2018-10-07 RX ORDER — SODIUM CHLORIDE 9 MG/ML
250 INJECTION, SOLUTION INTRAVENOUS ONCE
Status: CANCELLED | OUTPATIENT
Start: 2018-11-06 | End: 2018-10-30

## 2018-10-07 RX ORDER — PACLITAXEL 100 MG/20ML
125 INJECTION, POWDER, LYOPHILIZED, FOR SUSPENSION INTRAVENOUS ONCE
Status: CANCELLED | OUTPATIENT
Start: 2018-11-06

## 2018-10-07 RX ORDER — SODIUM CHLORIDE 9 MG/ML
250 INJECTION, SOLUTION INTRAVENOUS ONCE
Status: CANCELLED | OUTPATIENT
Start: 2018-11-13

## 2018-10-08 ENCOUNTER — LAB (OUTPATIENT)
Dept: LAB | Facility: HOSPITAL | Age: 68
End: 2018-10-08

## 2018-10-08 ENCOUNTER — READMISSION MANAGEMENT (OUTPATIENT)
Dept: CALL CENTER | Facility: HOSPITAL | Age: 68
End: 2018-10-08

## 2018-10-08 ENCOUNTER — TRANSCRIBE ORDERS (OUTPATIENT)
Dept: LAB | Facility: HOSPITAL | Age: 68
End: 2018-10-08

## 2018-10-08 DIAGNOSIS — A41.59 HEMORRHAGIC SEPTICEMIA DUE TO PASTEURELLA MULTOCIDA (HCC): Primary | ICD-10-CM

## 2018-10-08 DIAGNOSIS — K83.09 CHOLANGITIS: ICD-10-CM

## 2018-10-08 DIAGNOSIS — N18.30 CHRONIC KIDNEY DISEASE, STAGE III (MODERATE) (HCC): ICD-10-CM

## 2018-10-08 DIAGNOSIS — A41.59 HEMORRHAGIC SEPTICEMIA DUE TO PASTEURELLA MULTOCIDA (HCC): ICD-10-CM

## 2018-10-08 DIAGNOSIS — A28.0 HEMORRHAGIC SEPTICEMIA DUE TO PASTEURELLA MULTOCIDA (HCC): Primary | ICD-10-CM

## 2018-10-08 DIAGNOSIS — A28.0 HEMORRHAGIC SEPTICEMIA DUE TO PASTEURELLA MULTOCIDA (HCC): ICD-10-CM

## 2018-10-08 DIAGNOSIS — E13.8 DIABETES MELLITUS OF OTHER TYPE WITH COMPLICATION, UNSPECIFIED WHETHER LONG TERM INSULIN USE: ICD-10-CM

## 2018-10-08 LAB
ALBUMIN SERPL-MCNC: 3.78 G/DL (ref 3.2–4.8)
ALBUMIN/GLOB SERPL: 1.6 G/DL (ref 1.5–2.5)
ALP SERPL-CCNC: 275 U/L (ref 25–100)
ALT SERPL W P-5'-P-CCNC: 60 U/L (ref 7–40)
ANION GAP SERPL CALCULATED.3IONS-SCNC: 11 MMOL/L (ref 3–11)
AST SERPL-CCNC: 58 U/L (ref 0–33)
BASOPHILS # BLD AUTO: 0.04 10*3/MM3 (ref 0–0.2)
BASOPHILS NFR BLD AUTO: 0.5 % (ref 0–1)
BILIRUB SERPL-MCNC: 1 MG/DL (ref 0.3–1.2)
BUN BLD-MCNC: 16 MG/DL (ref 9–23)
BUN/CREAT SERPL: 11.9 (ref 7–25)
CALCIUM SPEC-SCNC: 9.2 MG/DL (ref 8.7–10.4)
CHLORIDE SERPL-SCNC: 96 MMOL/L (ref 99–109)
CO2 SERPL-SCNC: 30 MMOL/L (ref 20–31)
CREAT BLD-MCNC: 1.35 MG/DL (ref 0.6–1.3)
DEPRECATED RDW RBC AUTO: 47.4 FL (ref 37–54)
EOSINOPHIL # BLD AUTO: 0.21 10*3/MM3 (ref 0–0.3)
EOSINOPHIL NFR BLD AUTO: 2.6 % (ref 0–3)
ERYTHROCYTE [DISTWIDTH] IN BLOOD BY AUTOMATED COUNT: 14.6 % (ref 11.3–14.5)
GFR SERPL CREATININE-BSD FRML MDRD: 52 ML/MIN/1.73
GLOBULIN UR ELPH-MCNC: 2.4 GM/DL
GLUCOSE BLD-MCNC: 121 MG/DL (ref 70–100)
HCT VFR BLD AUTO: 38.4 % (ref 38.9–50.9)
HGB BLD-MCNC: 12.6 G/DL (ref 13.1–17.5)
IMM GRANULOCYTES # BLD: 0.23 10*3/MM3 (ref 0–0.03)
IMM GRANULOCYTES NFR BLD: 2.8 % (ref 0–0.6)
LYMPHOCYTES # BLD AUTO: 2.3 10*3/MM3 (ref 0.6–4.8)
LYMPHOCYTES NFR BLD AUTO: 28.1 % (ref 24–44)
MCH RBC QN AUTO: 29.4 PG (ref 27–31)
MCHC RBC AUTO-ENTMCNC: 32.8 G/DL (ref 32–36)
MCV RBC AUTO: 89.5 FL (ref 80–99)
MONOCYTES # BLD AUTO: 0.83 10*3/MM3 (ref 0–1)
MONOCYTES NFR BLD AUTO: 10.1 % (ref 0–12)
NEUTROPHILS # BLD AUTO: 4.58 10*3/MM3 (ref 1.5–8.3)
NEUTROPHILS NFR BLD AUTO: 55.9 % (ref 41–71)
PLATELET # BLD AUTO: 311 10*3/MM3 (ref 150–450)
PMV BLD AUTO: 10.1 FL (ref 6–12)
POTASSIUM BLD-SCNC: 4.3 MMOL/L (ref 3.5–5.5)
PROT SERPL-MCNC: 6.2 G/DL (ref 5.7–8.2)
RBC # BLD AUTO: 4.29 10*6/MM3 (ref 4.2–5.76)
SODIUM BLD-SCNC: 137 MMOL/L (ref 132–146)
WBC NRBC COR # BLD: 8.19 10*3/MM3 (ref 3.5–10.8)

## 2018-10-08 PROCEDURE — 80053 COMPREHEN METABOLIC PANEL: CPT

## 2018-10-08 PROCEDURE — 85025 COMPLETE CBC W/AUTO DIFF WBC: CPT

## 2018-10-08 PROCEDURE — 36415 COLL VENOUS BLD VENIPUNCTURE: CPT

## 2018-10-08 NOTE — OUTREACH NOTE
Sepsis Week 1 Survey      Responses   Facility patient discharged from?  [place]   Does the patient have one of the following disease processes/diagnoses(primary or secondary)?  Sepsis   Is there a successful TCM telephone encounter documented?  No   Week 1 attempt successful?  No   Unsuccessful attempts  Attempt 1          Viral Alejandra RN

## 2018-10-09 ENCOUNTER — HOSPITAL ENCOUNTER (OUTPATIENT)
Dept: INFUSION THERAPY | Facility: HOSPITAL | Age: 68
Setting detail: INFUSION SERIES
Discharge: HOME | End: 2018-10-09
Attending: INTERNAL MEDICINE

## 2018-10-09 ENCOUNTER — READMISSION MANAGEMENT (OUTPATIENT)
Dept: CALL CENTER | Facility: HOSPITAL | Age: 68
End: 2018-10-09

## 2018-10-09 VITALS
DIASTOLIC BLOOD PRESSURE: 66 MMHG | HEART RATE: 87 BPM | SYSTOLIC BLOOD PRESSURE: 113 MMHG | RESPIRATION RATE: 18 BRPM | TEMPERATURE: 98.3 F

## 2018-10-09 DIAGNOSIS — B96.20 BACTEREMIA DUE TO ESCHERICHIA COLI: ICD-10-CM

## 2018-10-09 DIAGNOSIS — R78.81 BACTEREMIA DUE TO ESCHERICHIA COLI: ICD-10-CM

## 2018-10-09 PROCEDURE — 96365 THER/PROPH/DIAG IV INF INIT: CPT

## 2018-10-09 PROCEDURE — 25010000002 CEFTRIAXONE: Performed by: INTERNAL MEDICINE

## 2018-10-09 RX ADMIN — CEFTRIAXONE 2 G: 2 INJECTION, POWDER, FOR SOLUTION INTRAMUSCULAR; INTRAVENOUS at 14:16

## 2018-10-09 NOTE — OUTREACH NOTE
Sepsis Week 1 Survey      Responses   Facility patient discharged from?  [facility]   Does the patient have one of the following disease processes/diagnoses(primary or secondary)?  Sepsis   Is there a successful TCM telephone encounter documented?  No   Week 1 attempt successful?  Yes   Call start time  1434   Rescheduled  Rescheduled-pt requested [place] hear on the phone, rescheduled]   Discharge diagnosis  Sepsis            Kasie Steward RN

## 2018-10-10 ENCOUNTER — HOSPITAL ENCOUNTER (OUTPATIENT)
Dept: INFUSION THERAPY | Facility: HOSPITAL | Age: 68
Setting detail: INFUSION SERIES
Discharge: HOME | End: 2018-10-10
Attending: INTERNAL MEDICINE

## 2018-10-10 VITALS
DIASTOLIC BLOOD PRESSURE: 62 MMHG | TEMPERATURE: 98 F | SYSTOLIC BLOOD PRESSURE: 93 MMHG | HEART RATE: 83 BPM | RESPIRATION RATE: 18 BRPM

## 2018-10-10 DIAGNOSIS — B96.20 BACTEREMIA DUE TO ESCHERICHIA COLI: ICD-10-CM

## 2018-10-10 DIAGNOSIS — R78.81 BACTEREMIA DUE TO ESCHERICHIA COLI: ICD-10-CM

## 2018-10-10 PROCEDURE — 25010000002 CEFTRIAXONE: Performed by: INTERNAL MEDICINE

## 2018-10-10 PROCEDURE — 96365 THER/PROPH/DIAG IV INF INIT: CPT

## 2018-10-10 RX ADMIN — CEFTRIAXONE 2 G: 2 INJECTION, POWDER, FOR SOLUTION INTRAMUSCULAR; INTRAVENOUS at 14:33

## 2018-10-11 ENCOUNTER — HOSPITAL ENCOUNTER (OUTPATIENT)
Dept: INFUSION THERAPY | Facility: HOSPITAL | Age: 68
Setting detail: INFUSION SERIES
Discharge: HOME | End: 2018-10-11
Attending: INTERNAL MEDICINE

## 2018-10-11 ENCOUNTER — OFFICE VISIT (OUTPATIENT)
Dept: CARDIOLOGY | Facility: CLINIC | Age: 68
End: 2018-10-11

## 2018-10-11 VITALS
HEART RATE: 91 BPM | BODY MASS INDEX: 28.85 KG/M2 | WEIGHT: 213 LBS | HEIGHT: 72 IN | OXYGEN SATURATION: 97 % | DIASTOLIC BLOOD PRESSURE: 70 MMHG | SYSTOLIC BLOOD PRESSURE: 108 MMHG

## 2018-10-11 VITALS
TEMPERATURE: 98.2 F | DIASTOLIC BLOOD PRESSURE: 72 MMHG | SYSTOLIC BLOOD PRESSURE: 113 MMHG | RESPIRATION RATE: 18 BRPM | HEART RATE: 83 BPM

## 2018-10-11 DIAGNOSIS — I10 ESSENTIAL HYPERTENSION: ICD-10-CM

## 2018-10-11 DIAGNOSIS — I48.91 ATRIAL FIBRILLATION, UNSPECIFIED TYPE (HCC): Primary | ICD-10-CM

## 2018-10-11 DIAGNOSIS — B96.20 BACTEREMIA DUE TO ESCHERICHIA COLI: ICD-10-CM

## 2018-10-11 DIAGNOSIS — Z79.01 CHRONIC ANTICOAGULATION: ICD-10-CM

## 2018-10-11 DIAGNOSIS — R78.81 BACTEREMIA DUE TO ESCHERICHIA COLI: ICD-10-CM

## 2018-10-11 PROCEDURE — 25010000002 CEFTRIAXONE: Performed by: INTERNAL MEDICINE

## 2018-10-11 PROCEDURE — 96365 THER/PROPH/DIAG IV INF INIT: CPT

## 2018-10-11 PROCEDURE — 99213 OFFICE O/P EST LOW 20 MIN: CPT | Performed by: INTERNAL MEDICINE

## 2018-10-11 RX ORDER — LOSARTAN POTASSIUM 100 MG/1
TABLET ORAL
COMMUNITY
Start: 2018-10-08 | End: 2018-10-11 | Stop reason: SDUPTHER

## 2018-10-11 RX ORDER — AMLODIPINE BESYLATE 5 MG/1
5 TABLET ORAL DAILY
Qty: 90 TABLET | Refills: 3 | Status: SHIPPED | OUTPATIENT
Start: 2018-10-11 | End: 2018-10-19

## 2018-10-11 RX ORDER — LOSARTAN POTASSIUM 100 MG/1
100 TABLET ORAL DAILY
Qty: 90 TABLET | Refills: 3 | Status: SHIPPED | OUTPATIENT
Start: 2018-10-11 | End: 2018-10-31 | Stop reason: SDUPTHER

## 2018-10-11 RX ORDER — ONDANSETRON 4 MG/1
4 TABLET, ORALLY DISINTEGRATING ORAL EVERY 6 HOURS PRN
Qty: 30 TABLET | Refills: 0 | Status: SHIPPED | OUTPATIENT
Start: 2018-10-11 | End: 2018-10-19 | Stop reason: DRUGHIGH

## 2018-10-11 RX ORDER — AMLODIPINE BESYLATE 5 MG/1
TABLET ORAL
COMMUNITY
Start: 2018-10-08 | End: 2018-10-11 | Stop reason: SDUPTHER

## 2018-10-11 RX ADMIN — CEFTRIAXONE 2 G: 2 INJECTION, POWDER, FOR SOLUTION INTRAMUSCULAR; INTRAVENOUS at 13:59

## 2018-10-11 NOTE — PROGRESS NOTES
subjective     Chief Complaint   Patient presents with   • Hospital Follow Up   • Follow-up     History of Present Illness  Patient is 68 years old white male who was recently diagnosed of having malignant neoplasm of the head of pancreas.  Patient has been following closely with Dr. Alves.  Recently he had the Escherichia coli septicemia and had been taking Rocephin intravenously.  From cardiac standpoint patient has paroxysmal atrial fibrillation on Tambocor therapy is maintaining in sinus rhythm and is anticoagulated with eliquis.  Patient is here for follow-up  Blood pressure is also well controlled.  He complains of nausea off and on and is taking Zofran.    Past Surgical History:   Procedure Laterality Date   • CARDIAC CATHETERIZATION  11/01/1999   • CARDIOVASCULAR STRESS TEST  09/2012   • CHOLECYSTECTOMY N/A 8/10/2018    Procedure: CHOLECYSTECTOMY LAPAROSCOPIC;  Surgeon: Ronak Downs MD;  Location: [facility] OR;  Service: General   • CYSTOSCOPY BLADDER STONE LITHOTRIPSY     • ECHO - CONVERTED  09/2012   • ERCP N/A 8/14/2018    Procedure: ENDOSCOPIC RETROGRADE CHOLANGIOPANCREATOGRAPHY WITH PAPILLOTOMY;  Surgeon: Scot Su MD;  Location: [facility] OR;  Service: Gastroenterology   • ERCP N/A 10/1/2018    Procedure: ENDOSCOPIC RETROGRADE CHOLANGIOPANCREATOGRAPHY;  Surgeon: Jefry Luna MD;  Location:  [facility];  Service: Gastroenterology   • KIDNEY STONE SURGERY     • KIDNEY STONE SURGERY      open abdominal surgery   • UPPER GASTROINTESTINAL ENDOSCOPY  08/30/2012     Family History   Problem Relation Age of Onset   • Heart attack Mother    • Heart disease Mother    • Stroke Mother    • Kidney disease Mother    • Heart failure Mother    • Lung disease Father    • Tuberculosis Father    • Diabetes Sister    • Heart attack Brother    • Heart failure Brother    • Heart disease Brother    • Diabetes Sister    • No Known Problems Brother    • No Known Problems Brother      Past Medical  History:   Diagnosis Date   • Abnormal ECG    • Antral gastritis    • Atrial fibrillation (CMS/HCC)     treated with oral blood thinner   • Chest pain    • COPD (chronic obstructive pulmonary disease) (CMS/Formerly [facility])    • Coronary artery disease     no stents   • Diabetes mellitus (CMS/[facility])     type 2    • Duodenitis    • Emphysema of lung (CMS/[facility])    • Gallbladder disease     removed    • GERD (gastroesophageal reflux disease)    • Hyperlipidemia    • Hypertension    • Kidney stone    • Kidney stones     had lithotripsy and passed 36 kidney stones as well as had one surgically removed.   • Malignant neoplasm of head of pancreas (CMS/Formerly [facility]) 9/5/2018   • Palpitations    • Reflux esophagitis    • Renal failure     stage 3 kidney disease     Patient Active Problem List   Diagnosis   • Hyperlipidemia   • COPD (chronic obstructive pulmonary disease) (CMS/[facility])   • Essential hypertension   • Gout without tophus   • Anxiety and depression   • Primary insomnia   • Gastroesophageal reflux disease without esophagitis   • Nonobstructive atherosclerosis of coronary artery   • CKD stage 3 secondary to diabetes (CMS/[facility])   • DM2 (diabetes mellitus, type 2) (CMS/[facility])   • Paroxysmal atrial fibrillation   • Chronic anticoagulation, eliquis   • Encounter for monitoring flecainide therapy   • Preoperative clearance   • Biliary dyskinesia   • Obesity (BMI 30.0-34.9)   • Hyperbilirubinemia   • Cholecystitis   • Malignant neoplasm of head of pancreas (CMS/[facility])   • Bacteremia due to Escherichia coli   • Biliary obstruction       Social History   Substance Use Topics   • Smoking status: Never Smoker   • Smokeless tobacco: Never Used   • Alcohol use No       Allergies   Allergen Reactions   • Contrast Dye Other (See Comments)     Can't have due to kidney failure per family       Current Outpatient Prescriptions on File Prior to Visit   Medication Sig   • albuterol (PROVENTIL HFA;VENTOLIN HFA) 108 (90 BASE) MCG/ACT inhaler Inhale 2 puffs Every 4  (Four) Hours As Needed for Wheezing or Shortness of Air.   • apixaban (ELIQUIS) 5 MG tablet tablet Take 1 tablet by mouth Every 12 (Twelve) Hours.   • cholecalciferol (VITAMIN D3) 1000 units tablet Take 1,000 Units by mouth Daily.   • colchicine 0.6 MG tablet Take 1 tablet by mouth Daily.   • flecainide (TAMBOCOR) 50 MG tablet Take 1 tablet by mouth Every 12 (Twelve) Hours.   • FLUoxetine (PROzac) 20 MG capsule Take 20 mg by mouth Every Night.   • lansoprazole (PREVACID) 30 MG capsule Take 30 mg by mouth Daily.   • metoprolol tartrate (LOPRESSOR) 50 MG tablet Take 25 mg by mouth Daily.   • mometasone (ASMANEX) 220 MCG/INH inhaler Inhale 2 puffs Every Night.   • montelukast (SINGULAIR) 10 MG tablet Take 10 mg by mouth Daily.   • Morphine (MS CONTIN) 30 MG 12 hr tablet Take 1 tablet by mouth Every 12 (Twelve) Hours.   • nitroglycerin (NITROSTAT) 0.4 MG SL tablet Place 0.4 mg under the tongue as needed for chest pain.   • oxyCODONE (ROXICODONE) 5 MG immediate release tablet Take 1-3 tabs every 4 hours as needed for pain   • pioglitazone (ACTOS) 30 MG tablet Take 30 mg by mouth Daily.   • polyethylene glycol (MIRALAX) packet Take 17 g by mouth Daily As Needed.   • sore muscle (ICY HOT EXTRA STRENGTH) 10-30 % cream cream Apply 1 application topically to the appropriate area as directed 3 (Three) Times a Day As Needed (local pain).   • Tiotropium Bromide-Olodaterol 2.5-2.5 MCG/ACT aerosol solution Inhale 2 puffs Daily.     No current facility-administered medications on file prior to visit.          The following portions of the patient's history were reviewed and updated as appropriate: allergies, current medications, past family history, past medical history, past social history, past surgical history and problem list.    Review of Systems   Constitution: Positive for weakness and malaise/fatigue.   HENT: Negative.  Negative for congestion.    Eyes: Negative.    Cardiovascular: Negative.  Negative for chest pain,  "cyanosis, dyspnea on exertion, irregular heartbeat, leg swelling, near-syncope, orthopnea, palpitations, paroxysmal nocturnal dyspnea and syncope.   Respiratory: Negative.  Negative for shortness of breath.    Hematologic/Lymphatic: Negative.    Musculoskeletal: Negative.    Gastrointestinal: Positive for bloating, abdominal pain, anorexia and nausea.   Neurological: Negative for headaches.   Psychiatric/Behavioral: The patient is nervous/anxious.           Objective:     /70 (BP Location: Left arm, Patient Position: Sitting)   Pulse 91   Ht 182.9 cm (72\")   Wt 96.6 kg (213 lb)   SpO2 97%   BMI 28.89 kg/m²   Physical Exam   Constitutional: He appears well-developed and well-nourished. No distress.   HENT:   Head: Normocephalic and atraumatic.   Mouth/Throat: Oropharynx is clear and moist. No oropharyngeal exudate.   Eyes: Pupils are equal, round, and reactive to light. Conjunctivae and EOM are normal. No scleral icterus.   Neck: Normal range of motion. Neck supple. No JVD present. No tracheal deviation present. No thyromegaly present.   Cardiovascular: Normal rate, regular rhythm, normal heart sounds and intact distal pulses.  PMI is not displaced.  Exam reveals no gallop, no friction rub and no decreased pulses.    No murmur heard.  Pulses:       Carotid pulses are 3+ on the right side, and 3+ on the left side.       Radial pulses are 3+ on the right side, and 3+ on the left side.   Pulmonary/Chest: Effort normal and breath sounds normal. No respiratory distress. He has no wheezes. He has no rales. He exhibits no tenderness.   Abdominal: Soft. Bowel sounds are normal. He exhibits no distension, no abdominal bruit and no mass. There is no splenomegaly or hepatomegaly. There is no tenderness. There is no rebound and no guarding.   Musculoskeletal: Normal range of motion. He exhibits no edema, tenderness or deformity.   Lymphadenopathy:     He has no cervical adenopathy.   Neurological: He is alert. He has " normal reflexes. No cranial nerve deficit. He exhibits normal muscle tone. Coordination normal.   Skin: Skin is warm and dry. No rash noted. He is not diaphoretic. No erythema.   Psychiatric: He has a normal mood and affect. His behavior is normal. Judgment and thought content normal.         Lab Review  Lab Results   Component Value Date     10/08/2018    K 4.3 10/08/2018    CL 96 (L) 10/08/2018    BUN 16 10/08/2018    CREATININE 1.35 (H) 10/08/2018    GLUCOSE 121 (H) 10/08/2018    CALCIUM 9.2 10/08/2018    ALT 60 (H) 10/08/2018    ALKPHOS 275 (H) 10/08/2018    LABIL2 1.5 03/25/2016     Lab Results   Component Value Date    CKTOTAL 107 08/24/2018     Lab Results   Component Value Date    WBC 8.19 10/08/2018    HGB 12.6 (L) 10/08/2018    HCT 38.4 (L) 10/08/2018     10/08/2018     Lab Results   Component Value Date    INR 0.99 09/30/2018    INR 1.08 08/21/2018    INR 1.00 08/13/2018     Lab Results   Component Value Date    MG 1.5 10/04/2018     Lab Results   Component Value Date    PSA 1.450 07/11/2017    TSH 0.621 09/30/2018     Lab Results   Component Value Date    .0 (H) 09/12/2018     Lab Results   Component Value Date    CHLPL 144 03/25/2016    CHOL 128 04/27/2018    TRIG 140 04/27/2018    HDL 41 (L) 04/27/2018    VLDL 28 04/27/2018    LDLHDL 1.44 04/27/2018     Lab Results   Component Value Date    LDL 59 04/27/2018    LDL 88 10/23/2017       Procedures       I personally viewed and interpreted the patient's LAB data         Assessment:     1. Atrial fibrillation, unspecified type (CMS/HCC)    2. Essential hypertension    3. Chronic anticoagulation, eliquis          Plan:      Todd is 68 years old gentleman who unfortunately has been diagnosed of having malignant neoplasm of the head of pancreas.  Recently we had Escherichia coli septicemia and just finished Rocephin therapy.  He is following closely with Dr. Alves.    From cardiac standpoint he is doing very well.  Paroxysmal atrial  fibrillation maintaining in sinus rhythm with the flecainide therapy.  Anticoagulation with eliquis low-dose.  Blood pressure is well controlled.  He was advised to check his blood pressure closely because he may need to hold off blood pressure medications if blood pressure drops further.    Agent will call for instructions if blood pressure drops.      No Follow-up on file.

## 2018-10-12 ENCOUNTER — HOSPITAL ENCOUNTER (OUTPATIENT)
Dept: INFUSION THERAPY | Facility: HOSPITAL | Age: 68
Setting detail: INFUSION SERIES
Discharge: HOME | End: 2018-10-12
Attending: INTERNAL MEDICINE

## 2018-10-12 VITALS
RESPIRATION RATE: 16 BRPM | TEMPERATURE: 98.2 F | DIASTOLIC BLOOD PRESSURE: 73 MMHG | SYSTOLIC BLOOD PRESSURE: 109 MMHG | HEART RATE: 83 BPM

## 2018-10-12 DIAGNOSIS — B96.20 BACTEREMIA DUE TO ESCHERICHIA COLI: ICD-10-CM

## 2018-10-12 DIAGNOSIS — R78.81 BACTEREMIA DUE TO ESCHERICHIA COLI: ICD-10-CM

## 2018-10-12 PROCEDURE — 96365 THER/PROPH/DIAG IV INF INIT: CPT

## 2018-10-12 PROCEDURE — 25010000002 CEFTRIAXONE: Performed by: INTERNAL MEDICINE

## 2018-10-12 RX ADMIN — CEFTRIAXONE 2 G: 2 INJECTION, POWDER, FOR SOLUTION INTRAMUSCULAR; INTRAVENOUS at 13:57

## 2018-10-13 ENCOUNTER — HOSPITAL ENCOUNTER (OUTPATIENT)
Dept: INFUSION THERAPY | Facility: HOSPITAL | Age: 68
Setting detail: INFUSION SERIES
Discharge: HOME | End: 2018-10-13
Attending: INTERNAL MEDICINE

## 2018-10-13 VITALS
RESPIRATION RATE: 20 BRPM | HEART RATE: 94 BPM | SYSTOLIC BLOOD PRESSURE: 90 MMHG | TEMPERATURE: 98.3 F | DIASTOLIC BLOOD PRESSURE: 60 MMHG

## 2018-10-13 DIAGNOSIS — R78.81 BACTEREMIA DUE TO ESCHERICHIA COLI: ICD-10-CM

## 2018-10-13 DIAGNOSIS — B96.20 BACTEREMIA DUE TO ESCHERICHIA COLI: ICD-10-CM

## 2018-10-13 PROCEDURE — 25010000002 CEFTRIAXONE PER 250 MG: Performed by: INTERNAL MEDICINE

## 2018-10-13 PROCEDURE — 96365 THER/PROPH/DIAG IV INF INIT: CPT

## 2018-10-13 RX ADMIN — CEFTRIAXONE SODIUM 2 G: 1 INJECTION, POWDER, FOR SOLUTION INTRAMUSCULAR; INTRAVENOUS at 11:16

## 2018-10-14 ENCOUNTER — HOSPITAL ENCOUNTER (OUTPATIENT)
Dept: INFUSION THERAPY | Facility: HOSPITAL | Age: 68
Setting detail: INFUSION SERIES
Discharge: HOME | End: 2018-10-14
Attending: INTERNAL MEDICINE

## 2018-10-14 VITALS
RESPIRATION RATE: 20 BRPM | DIASTOLIC BLOOD PRESSURE: 68 MMHG | TEMPERATURE: 98.3 F | HEART RATE: 90 BPM | SYSTOLIC BLOOD PRESSURE: 122 MMHG

## 2018-10-14 DIAGNOSIS — R78.81 BACTEREMIA DUE TO ESCHERICHIA COLI: ICD-10-CM

## 2018-10-14 DIAGNOSIS — B96.20 BACTEREMIA DUE TO ESCHERICHIA COLI: ICD-10-CM

## 2018-10-14 PROCEDURE — 25010000002 CEFTRIAXONE: Performed by: INTERNAL MEDICINE

## 2018-10-14 PROCEDURE — 96365 THER/PROPH/DIAG IV INF INIT: CPT

## 2018-10-14 RX ADMIN — CEFTRIAXONE 2 G: 2 INJECTION, POWDER, FOR SOLUTION INTRAMUSCULAR; INTRAVENOUS at 09:25

## 2018-10-15 ENCOUNTER — READMISSION MANAGEMENT (OUTPATIENT)
Dept: CALL CENTER | Facility: HOSPITAL | Age: 68
End: 2018-10-15

## 2018-10-15 NOTE — OUTREACH NOTE
Sepsis Week 2 Survey      Responses   Facility patient discharged from?  [facility]   Does the patient have one of the following disease processes/diagnoses(primary or secondary)?  Sepsis   Week 2 attempt successful?  Yes   Call start time  1146   Call end time  1152   Discharge diagnosis  Sepsis     Is patient permission given to speak with other caregiver?  Yes   List who call center can speak with  Emmy Phillips, spouse   Medication alerts for this patient  completed IV infusions on 10/14/18   Meds reviewed with patient/caregiver?  Yes   Is the patient having any side effects they believe may be caused by any medication additions or changes?  No   Does the patient have all medications related to this admission filled (includes all antibiotics, inhalers, nebulizers,steroids,etc.)  Yes   Prescription comments  Dr Brody has d/c'd Flagyl   Is the patient taking all medications as directed (includes completed medication regime)?  Yes   Does the patient have a primary care provider?   Yes   Does the patient have an appointment with their PCP within 7 days of discharge?  Yes   Has the patient kept scheduled appointments due by today?  Yes   Comments  pt will be seeing oncologist soon, to set up chemotherapy   What DME was ordered?  Pt. has shower chair, wheelchair and C-PAP machine at home   Has all DME been delivered?  No   Psychosocial issues?  No   Comments  pt states nausea and poor appetite is still a problem, has completed antibiotic infusions on 10/14, and is hopeful that appetite will return   Did the patient receive a copy of their discharge instructions?  Yes   Nursing interventions  Reviewed instructions with patient   What is the patient's perception of their health status since discharge?  Improving   Nursing interventions  Nurse provided patient education   Is the patient/caregiver able to teach back Sepsis?  P - Pale or discolored skin, E - Extreme pain or generalized discomfort (worst ever,especially  abdomen), S - Shivering,fever or very cold, S - Sleepy, difficult to arouse,confused, I -   I feel like I might die-a feeling of hopelessness, S - Short of breath   Nursing interventions  Nurse provided patient education   Is patient/caregiver able to teach back steps to recovery at home?  Set small, achievable goals for return to baseline health, Rest and regain strength, Eat a balanced diet   Is the patient/caregiver able to teach back signs and symptoms of worsening condition:  Fever, Hyperthermia, Rapid heart rate (>90), Shortness of breath/rapid respiratory rate, Altered mental status(confusion/coma), Edema   Is the patient/caregiver able to teach back the hierarchy of who to call/visit for symptoms/problems? PCP, Specialist, Home health nurse, Urgent Care, ED, 911  Yes   Week 2 call completed?  Yes          Tara Noble RN

## 2018-10-16 ENCOUNTER — LAB (OUTPATIENT)
Dept: LAB | Facility: HOSPITAL | Age: 68
End: 2018-10-16
Attending: INTERNAL MEDICINE

## 2018-10-16 ENCOUNTER — TELEPHONE (OUTPATIENT)
Dept: CARDIOLOGY | Facility: CLINIC | Age: 68
End: 2018-10-16

## 2018-10-16 ENCOUNTER — APPOINTMENT (OUTPATIENT)
Dept: PREADMISSION TESTING | Facility: HOSPITAL | Age: 68
End: 2018-10-16

## 2018-10-16 ENCOUNTER — TRANSCRIBE ORDERS (OUTPATIENT)
Dept: LAB | Facility: HOSPITAL | Age: 68
End: 2018-10-16

## 2018-10-16 ENCOUNTER — OFFICE VISIT (OUTPATIENT)
Dept: SURGERY | Facility: CLINIC | Age: 68
End: 2018-10-16

## 2018-10-16 VITALS — BODY MASS INDEX: 28.89 KG/M2 | WEIGHT: 213 LBS

## 2018-10-16 VITALS — HEIGHT: 72 IN | WEIGHT: 225 LBS | BODY MASS INDEX: 30.48 KG/M2

## 2018-10-16 DIAGNOSIS — M10.9 GOUT, UNSPECIFIED CAUSE, UNSPECIFIED CHRONICITY, UNSPECIFIED SITE: ICD-10-CM

## 2018-10-16 DIAGNOSIS — I48.91 ATRIAL FIBRILLATION, UNSPECIFIED TYPE (HCC): ICD-10-CM

## 2018-10-16 DIAGNOSIS — C25.0 MALIGNANT NEOPLASM OF HEAD OF PANCREAS (HCC): Primary | ICD-10-CM

## 2018-10-16 DIAGNOSIS — N18.30 CHRONIC KIDNEY DISEASE, STAGE III (MODERATE) (HCC): ICD-10-CM

## 2018-10-16 DIAGNOSIS — N18.30 CHRONIC KIDNEY DISEASE, STAGE III (MODERATE) (HCC): Primary | ICD-10-CM

## 2018-10-16 LAB
ALBUMIN SERPL-MCNC: 4.3 G/DL (ref 3.4–4.8)
ALBUMIN/GLOB SERPL: 1.3 G/DL (ref 1.5–2.5)
ALP SERPL-CCNC: 173 U/L (ref 40–129)
ALT SERPL W P-5'-P-CCNC: 31 U/L (ref 10–44)
ANION GAP SERPL CALCULATED.3IONS-SCNC: 9.3 MMOL/L (ref 3.6–11.2)
AST SERPL-CCNC: 37 U/L (ref 10–34)
BACTERIA UR QL AUTO: ABNORMAL /HPF
BASOPHILS # BLD AUTO: 0.04 10*3/MM3 (ref 0–0.3)
BASOPHILS NFR BLD AUTO: 0.6 % (ref 0–2)
BILIRUB SERPL-MCNC: 0.9 MG/DL (ref 0.2–1.8)
BILIRUB UR QL STRIP: NEGATIVE
BUN BLD-MCNC: 22 MG/DL (ref 7–21)
BUN/CREAT SERPL: 12.9 (ref 7–25)
CALCIUM SPEC-SCNC: 10.3 MG/DL (ref 7.7–10)
CHLORIDE SERPL-SCNC: 98 MMOL/L (ref 99–112)
CLARITY UR: CLEAR
CO2 SERPL-SCNC: 30.7 MMOL/L (ref 24.3–31.9)
COLOR UR: ABNORMAL
CREAT BLD-MCNC: 1.7 MG/DL (ref 0.43–1.29)
CREAT UR-MCNC: 208.8 MG/DL
DEPRECATED RDW RBC AUTO: 46.3 FL (ref 37–54)
EOSINOPHIL # BLD AUTO: 0.07 10*3/MM3 (ref 0–0.7)
EOSINOPHIL NFR BLD AUTO: 1.1 % (ref 0–7)
ERYTHROCYTE [DISTWIDTH] IN BLOOD BY AUTOMATED COUNT: 14.1 % (ref 11.5–14.5)
GFR SERPL CREATININE-BSD FRML MDRD: 40 ML/MIN/1.73
GLOBULIN UR ELPH-MCNC: 3.4 GM/DL
GLUCOSE BLD-MCNC: 124 MG/DL (ref 70–110)
GLUCOSE UR STRIP-MCNC: NEGATIVE MG/DL
HCT VFR BLD AUTO: 40.1 % (ref 42–52)
HGB BLD-MCNC: 13.1 G/DL (ref 14–18)
HGB UR QL STRIP.AUTO: NEGATIVE
HYALINE CASTS UR QL AUTO: ABNORMAL /LPF
IMM GRANULOCYTES # BLD: 0.03 10*3/MM3 (ref 0–0.03)
IMM GRANULOCYTES NFR BLD: 0.5 % (ref 0–0.5)
KETONES UR QL STRIP: ABNORMAL
LEUKOCYTE ESTERASE UR QL STRIP.AUTO: NEGATIVE
LYMPHOCYTES # BLD AUTO: 1.83 10*3/MM3 (ref 1–3)
LYMPHOCYTES NFR BLD AUTO: 28.2 % (ref 16–46)
MCH RBC QN AUTO: 29.4 PG (ref 27–33)
MCHC RBC AUTO-ENTMCNC: 32.7 G/DL (ref 33–37)
MCV RBC AUTO: 89.9 FL (ref 80–94)
MONOCYTES # BLD AUTO: 0.82 10*3/MM3 (ref 0.1–0.9)
MONOCYTES NFR BLD AUTO: 12.7 % (ref 0–12)
NEUTROPHILS # BLD AUTO: 3.69 10*3/MM3 (ref 1.4–6.5)
NEUTROPHILS NFR BLD AUTO: 56.9 % (ref 40–75)
NITRITE UR QL STRIP: NEGATIVE
OSMOLALITY SERPL CALC.SUM OF ELEC: 280.4 MOSM/KG (ref 273–305)
PH UR STRIP.AUTO: <=5 [PH] (ref 5–8)
PLATELET # BLD AUTO: 356 10*3/MM3 (ref 130–400)
PMV BLD AUTO: 10 FL (ref 6–10)
POTASSIUM BLD-SCNC: 4.7 MMOL/L (ref 3.5–5.3)
PROT SERPL-MCNC: 7.7 G/DL (ref 6–8)
PROT UR QL STRIP: NEGATIVE
PROT UR-MCNC: 20.5 MG/DL
PROT/CREAT UR: 98.2 MG/G CREA (ref 0–200)
RBC # BLD AUTO: 4.46 10*6/MM3 (ref 4.7–6.1)
RBC # UR: ABNORMAL /HPF
REF LAB TEST METHOD: ABNORMAL
SODIUM BLD-SCNC: 138 MMOL/L (ref 135–153)
SP GR UR STRIP: 1.02 (ref 1–1.03)
SQUAMOUS #/AREA URNS HPF: ABNORMAL /HPF
URATE SERPL-MCNC: 7.5 MG/DL (ref 3.7–7)
UROBILINOGEN UR QL STRIP: ABNORMAL
WBC NRBC COR # BLD: 6.48 10*3/MM3 (ref 4.5–12.5)
WBC UR QL AUTO: ABNORMAL /HPF

## 2018-10-16 PROCEDURE — 81001 URINALYSIS AUTO W/SCOPE: CPT

## 2018-10-16 PROCEDURE — 84550 ASSAY OF BLOOD/URIC ACID: CPT

## 2018-10-16 PROCEDURE — 84156 ASSAY OF PROTEIN URINE: CPT

## 2018-10-16 PROCEDURE — 82570 ASSAY OF URINE CREATININE: CPT

## 2018-10-16 PROCEDURE — 80053 COMPREHEN METABOLIC PANEL: CPT

## 2018-10-16 PROCEDURE — 85025 COMPLETE CBC W/AUTO DIFF WBC: CPT

## 2018-10-16 PROCEDURE — 36415 COLL VENOUS BLD VENIPUNCTURE: CPT

## 2018-10-16 PROCEDURE — 99213 OFFICE O/P EST LOW 20 MIN: CPT | Performed by: SURGERY

## 2018-10-16 RX ORDER — CEFAZOLIN SODIUM 2 G/50ML
2 SOLUTION INTRAVENOUS ONCE
Status: CANCELLED | OUTPATIENT
Start: 2018-10-23 | End: 2018-10-23

## 2018-10-16 RX ORDER — FLUOXETINE HYDROCHLORIDE 20 MG/1
20 CAPSULE ORAL DAILY
COMMUNITY
End: 2018-11-02 | Stop reason: SDUPTHER

## 2018-10-16 NOTE — PROGRESS NOTES
Subjective   Todd Phillips is a 68 y.o. male is being seen for consultation today at the request of Adiel Denney MD    Todd Phillips is a 68 y.o. male 68-year-old male with pancreatic cancer need for chemotherapy access.  He has had intermittent bouts of sepsis that is delayed port placement and biopsy for pathologic diagnosis.  He has a bare-metal stent in place now and will undergo chemotherapy next week.  Port will be placed on day of first treatment.        Past Medical History:   Diagnosis Date   • Abnormal ECG    • Antral gastritis    • Atrial fibrillation (CMS/HCC)     treated with oral blood thinner   • Chest pain    • COPD (chronic obstructive pulmonary disease) (CMS/HCC)    • Coronary artery disease     no stents   • Diabetes mellitus (CMS/HCC)     type 2    • Duodenitis    • Emphysema of lung (CMS/HCC)    • Gallbladder disease     removed    • GERD (gastroesophageal reflux disease)    • Hyperlipidemia    • Hypertension    • Kidney stone    • Kidney stones     had lithotripsy and passed 36 kidney stones as well as had one surgically removed.   • Malignant neoplasm of head of pancreas (CMS/HCC) 9/5/2018   • Palpitations    • Reflux esophagitis    • Renal failure     stage 3 kidney disease       Family History   Problem Relation Age of Onset   • Heart attack Mother    • Heart disease Mother    • Stroke Mother    • Kidney disease Mother    • Heart failure Mother    • Lung disease Father    • Tuberculosis Father    • Diabetes Sister    • Heart attack Brother    • Heart failure Brother    • Heart disease Brother    • Diabetes Sister    • No Known Problems Brother    • No Known Problems Brother        Social History     Social History   • Marital status:      Spouse name: N/A   • Number of children: N/A   • Years of education: N/A     Occupational History   • Not on file.     Social History Main Topics   • Smoking status: Never Smoker   • Smokeless tobacco: Never Used   • Alcohol use No   • Drug  use: No   • Sexual activity: Defer     Other Topics Concern   • Not on file     Social History Narrative    He is  and lives alone with his wife although they have 3 children together who all live close by. He is retired from the [employer] and was exposed to Agent Orange during [place]. He is a lifelong nonsmoker.        Past Surgical History:   Procedure Laterality Date   • CARDIAC CATHETERIZATION  11/01/1999   • CARDIOVASCULAR STRESS TEST  09/2012   • CHOLECYSTECTOMY N/A 8/10/2018    Procedure: CHOLECYSTECTOMY LAPAROSCOPIC;  Surgeon: Ronak Downs MD;  Location: [facility] OR;  Service: General   • CYSTOSCOPY BLADDER STONE LITHOTRIPSY     • ECHO - CONVERTED  09/2012   • ERCP N/A 8/14/2018    Procedure: ENDOSCOPIC RETROGRADE CHOLANGIOPANCREATOGRAPHY WITH PAPILLOTOMY;  Surgeon: Scot Su MD;  Location: [facility] OR;  Service: Gastroenterology   • ERCP N/A 10/1/2018    Procedure: ENDOSCOPIC RETROGRADE CHOLANGIOPANCREATOGRAPHY;  Surgeon: Jefry Luna MD;  Location:  [facility];  Service: Gastroenterology   • KIDNEY STONE SURGERY     • KIDNEY STONE SURGERY      open abdominal surgery   • UPPER GASTROINTESTINAL ENDOSCOPY  08/30/2012       Review of Systems   Constitutional: Negative for activity change, appetite change, chills and fever.   HENT: Negative for sore throat and trouble swallowing.    Eyes: Negative for visual disturbance.   Respiratory: Negative for cough and shortness of breath.    Cardiovascular: Negative for chest pain and palpitations.   Gastrointestinal: Negative for abdominal distention, abdominal pain, blood in stool, constipation, diarrhea, nausea and vomiting.   Endocrine: Negative for cold intolerance and heat intolerance.   Genitourinary: Negative for dysuria.   Musculoskeletal: Negative for joint swelling.   Skin: Negative for color change, rash and wound.   Allergic/Immunologic: Negative for immunocompromised state.   Neurological: Negative for dizziness,  "seizures, weakness and headaches.   Hematological: Negative for adenopathy. Does not bruise/bleed easily.   Psychiatric/Behavioral: Negative for agitation and confusion.         Ht 182.9 cm (72\")   Wt 102 kg (225 lb)   BMI 30.52 kg/m²   Objective   Physical Exam   Constitutional: He is oriented to person, place, and time. He appears well-developed.   HENT:   Head: Normocephalic and atraumatic.   Mouth/Throat: Mucous membranes are normal.   Eyes: Pupils are equal, round, and reactive to light. Conjunctivae are normal.   Neck: Neck supple. No JVD present. No tracheal deviation present. No thyromegaly present.   Cardiovascular: Normal rate and regular rhythm.  Exam reveals no gallop and no friction rub.    No murmur heard.  Pulmonary/Chest: Effort normal and breath sounds normal.   Abdominal: Soft. He exhibits no distension. There is no splenomegaly or hepatomegaly. There is no tenderness. No hernia.   Musculoskeletal: Normal range of motion. He exhibits no deformity.   Neurological: He is alert and oriented to person, place, and time.   Skin: Skin is warm and dry.   Psychiatric: He has a normal mood and affect.         Todd was seen today for port placement consult.    Diagnoses and all orders for this visit:    Malignant neoplasm of head of pancreas (CMS/HCC)  -     Case Request; Standing  -     CeFAZolin Sodium-Dextrose (ANCEF) IVPB (duplex) 2 g; Infuse 2,000 mg into a venous catheter 1 (One) Time.  -     Case Request    Other orders  -     Follow anesthesia standing orders.  -     Provide instructions to patient on NPO status  -     Clorhexidine skin prep  -     Follow Anesthesia Guidelines / Standing Orders; Standing  -     Verify NPO Status; Standing  -     Obtain informed consent; Standing  -     SCD (sequential compression device)- to be placed on patient in Pre-op; Standing        Assessment     Todd Phillips is a 68 y.o. male with pancreatic cancer and need for chemotherapy access.  He will undergo " chemotherapy next Tuesday and I will place a port that morning and leave it accessed for initiation of treatments.    Patient's Body mass index is 30.52 kg/m². BMI is above normal parameters. Recommendations include: educational material.

## 2018-10-16 NOTE — TELEPHONE ENCOUNTER
----- Message from Adiel Denney MD sent at 10/16/2018  2:43 PM EDT -----  Renal functions are getting worse.  Drink more fluids

## 2018-10-17 ENCOUNTER — HOSPITAL ENCOUNTER (EMERGENCY)
Facility: HOSPITAL | Age: 68
Discharge: HOME | End: 2018-10-17
Attending: EMERGENCY MEDICINE | Admitting: EMERGENCY MEDICINE

## 2018-10-17 ENCOUNTER — APPOINTMENT (OUTPATIENT)
Dept: GENERAL RADIOLOGY | Facility: HOSPITAL | Age: 68
End: 2018-10-17

## 2018-10-17 VITALS
HEIGHT: 72 IN | SYSTOLIC BLOOD PRESSURE: 115 MMHG | DIASTOLIC BLOOD PRESSURE: 75 MMHG | RESPIRATION RATE: 18 BRPM | WEIGHT: 213 LBS | OXYGEN SATURATION: 95 % | HEART RATE: 83 BPM | TEMPERATURE: 98.2 F | BODY MASS INDEX: 28.85 KG/M2

## 2018-10-17 DIAGNOSIS — N18.9 ACUTE ON CHRONIC RENAL INSUFFICIENCY: Primary | ICD-10-CM

## 2018-10-17 DIAGNOSIS — E86.0 DEHYDRATION: ICD-10-CM

## 2018-10-17 DIAGNOSIS — N28.9 ACUTE ON CHRONIC RENAL INSUFFICIENCY: Primary | ICD-10-CM

## 2018-10-17 LAB
ALBUMIN SERPL-MCNC: 4.29 G/DL (ref 3.2–4.8)
ALBUMIN/GLOB SERPL: 1.4 G/DL (ref 1.5–2.5)
ALP SERPL-CCNC: 166 U/L (ref 25–100)
ALT SERPL W P-5'-P-CCNC: 23 U/L (ref 7–40)
ANION GAP SERPL CALCULATED.3IONS-SCNC: 9 MMOL/L (ref 3–11)
AST SERPL-CCNC: 38 U/L (ref 0–33)
BASOPHILS # BLD AUTO: 0.02 10*3/MM3 (ref 0–0.2)
BASOPHILS NFR BLD AUTO: 0.2 % (ref 0–1)
BILIRUB SERPL-MCNC: 0.7 MG/DL (ref 0.3–1.2)
BNP SERPL-MCNC: 11 PG/ML (ref 0–100)
BUN BLD-MCNC: 25 MG/DL (ref 9–23)
BUN/CREAT SERPL: 16.9 (ref 7–25)
CALCIUM SPEC-SCNC: 9.8 MG/DL (ref 8.7–10.4)
CHLORIDE SERPL-SCNC: 94 MMOL/L (ref 99–109)
CO2 SERPL-SCNC: 27 MMOL/L (ref 20–31)
CREAT BLD-MCNC: 1.48 MG/DL (ref 0.6–1.3)
D-LACTATE SERPL-SCNC: 1.7 MMOL/L (ref 0.5–2)
DEPRECATED RDW RBC AUTO: 46.1 FL (ref 37–54)
EOSINOPHIL # BLD AUTO: 0.09 10*3/MM3 (ref 0–0.3)
EOSINOPHIL NFR BLD AUTO: 1 % (ref 0–3)
ERYTHROCYTE [DISTWIDTH] IN BLOOD BY AUTOMATED COUNT: 14.1 % (ref 11.3–14.5)
GFR SERPL CREATININE-BSD FRML MDRD: 47 ML/MIN/1.73
GLOBULIN UR ELPH-MCNC: 3 GM/DL
GLUCOSE BLD-MCNC: 131 MG/DL (ref 70–100)
HCT VFR BLD AUTO: 39.1 % (ref 38.9–50.9)
HGB BLD-MCNC: 12.8 G/DL (ref 13.1–17.5)
HOLD SPECIMEN: NORMAL
HOLD SPECIMEN: NORMAL
IMM GRANULOCYTES # BLD: 0.04 10*3/MM3 (ref 0–0.03)
IMM GRANULOCYTES NFR BLD: 0.5 % (ref 0–0.6)
LIPASE SERPL-CCNC: 29 U/L (ref 6–51)
LYMPHOCYTES # BLD AUTO: 2.6 10*3/MM3 (ref 0.6–4.8)
LYMPHOCYTES NFR BLD AUTO: 29.8 % (ref 24–44)
MCH RBC QN AUTO: 29.2 PG (ref 27–31)
MCHC RBC AUTO-ENTMCNC: 32.7 G/DL (ref 32–36)
MCV RBC AUTO: 89.1 FL (ref 80–99)
MONOCYTES # BLD AUTO: 0.93 10*3/MM3 (ref 0–1)
MONOCYTES NFR BLD AUTO: 10.7 % (ref 0–12)
NEUTROPHILS # BLD AUTO: 5.09 10*3/MM3 (ref 1.5–8.3)
NEUTROPHILS NFR BLD AUTO: 58.3 % (ref 41–71)
PLATELET # BLD AUTO: 335 10*3/MM3 (ref 150–450)
PMV BLD AUTO: 9.6 FL (ref 6–12)
POTASSIUM BLD-SCNC: 4.1 MMOL/L (ref 3.5–5.5)
PROT SERPL-MCNC: 7.3 G/DL (ref 5.7–8.2)
RBC # BLD AUTO: 4.39 10*6/MM3 (ref 4.2–5.76)
SODIUM BLD-SCNC: 130 MMOL/L (ref 132–146)
TROPONIN I SERPL-MCNC: 0 NG/ML (ref 0–0.07)
WBC NRBC COR # BLD: 8.73 10*3/MM3 (ref 3.5–10.8)
WHOLE BLOOD HOLD SPECIMEN: NORMAL
WHOLE BLOOD HOLD SPECIMEN: NORMAL

## 2018-10-17 PROCEDURE — 83880 ASSAY OF NATRIURETIC PEPTIDE: CPT

## 2018-10-17 PROCEDURE — 83605 ASSAY OF LACTIC ACID: CPT | Performed by: EMERGENCY MEDICINE

## 2018-10-17 PROCEDURE — 80053 COMPREHEN METABOLIC PANEL: CPT

## 2018-10-17 PROCEDURE — 85025 COMPLETE CBC W/AUTO DIFF WBC: CPT

## 2018-10-17 PROCEDURE — 83690 ASSAY OF LIPASE: CPT

## 2018-10-17 PROCEDURE — 99284 EMERGENCY DEPT VISIT MOD MDM: CPT

## 2018-10-17 PROCEDURE — 71045 X-RAY EXAM CHEST 1 VIEW: CPT

## 2018-10-17 PROCEDURE — 93005 ELECTROCARDIOGRAM TRACING: CPT | Performed by: EMERGENCY MEDICINE

## 2018-10-17 PROCEDURE — 25010000002 ONDANSETRON PER 1 MG: Performed by: EMERGENCY MEDICINE

## 2018-10-17 PROCEDURE — 84484 ASSAY OF TROPONIN QUANT: CPT

## 2018-10-17 PROCEDURE — 96374 THER/PROPH/DIAG INJ IV PUSH: CPT

## 2018-10-17 RX ORDER — ONDANSETRON 2 MG/ML
4 INJECTION INTRAMUSCULAR; INTRAVENOUS ONCE
Status: COMPLETED | OUTPATIENT
Start: 2018-10-17 | End: 2018-10-17

## 2018-10-17 RX ORDER — SODIUM CHLORIDE 0.9 % (FLUSH) 0.9 %
10 SYRINGE (ML) INJECTION AS NEEDED
Status: DISCONTINUED | OUTPATIENT
Start: 2018-10-17 | End: 2018-10-17 | Stop reason: HOSPADM

## 2018-10-17 RX ADMIN — SODIUM CHLORIDE 500 ML: 9 INJECTION, SOLUTION INTRAVENOUS at 17:01

## 2018-10-17 RX ADMIN — SODIUM CHLORIDE 500 ML: 9 INJECTION, SOLUTION INTRAVENOUS at 17:43

## 2018-10-17 RX ADMIN — ONDANSETRON 4 MG: 2 INJECTION, SOLUTION INTRAMUSCULAR; INTRAVENOUS at 17:02

## 2018-10-17 NOTE — DISCHARGE INSTRUCTIONS
Push fluids.  Try half and half with water and Gatorade.    Return to the emergency department if you have any concerns of worsening condition or you begin running fevers.    Please review the medications you are supposed to be taking according to prior physician recommendations. I have not changed your home medications during this visit. If your discharge instructions indicate that I have changed your home medications, this is not the case, and you should disregard. If you have any questions about the medication you should be taking at home, please call your physician.

## 2018-10-17 NOTE — ED PROVIDER NOTES
Chencho Phillips is a 68 y.o.male who presents to the ED with his wife with c/o referral to [facility] ED by Dr. Leiva increased seen by ID today BENIGNO Marino for elevated creatinine. He initially developed abdominal pain following a cholecystectomy on 8/10/18 by Dr. Himanshu MD. He was admitted 3 days later for biliary obstruction and had a stent placed. He was admitted then 2 days later had a secondary operation for a post-op complication. He was then admitted on 8/21/18 for sepsis s/t bilateral PNA. He was admitted several times for sepsis then had a CT scan of his abdomen that showed a pancreatic mass then had a biopsy by Dr. Nam MD that is suspicious for cancer. He saw Dr. Jumana MD for IV infusion and peripheral stent placement. He saw Dr. Jumana MD for port placement yesterday then was seen today by BENIGNO Marino. Upon receiving his lab work up he noticed that his creatinine had increased from prior lab work 3 days ago from 1.4 to 1.7. Per his wife, he experienced a significant increase in his blood pressure 2 weeks ago and was seen at [place] ED. He has a CT abdominal that look good at that time. He was already on Norvasc 25 mg but had it increased to 100 mg. Yesterday, he developed dizziness and weakness so she checked his blood pressure noting it to be hypotensive at 70/50. He experienced a syncopal episode but denies any injury. This morning she only gave him a 25 mg Norvasc because she was worried about dropping his blood pressure. He had been placed on Norvasc 100 after a hypertensive episode within the last week.        He experiences constant nausea that worsens with drinking water so he has a decreased fluid intake. He tries to drink gatorade but does not like the sugar taste. He experienced constant abdominal pain that worsens with movement and twisting. He denies any fevers and his wife states his temperature has been 97.8. She has noticed him to be dizzy and confused. He denies any  chest pain, dyspnea, palpitation, vomiting, diarrhea, melena, constipation, dysuria or any additional acute complaints at this time. He has an appointment with Gwen Mcdonald this coming Friday for a chemotherapy infusion. He has a h/o CKD3, pancreatic mass, biliary obstruction, A-fib on Eliquis, HTN on Norvasc, DM2 on insulin and GERD.         History provided by:  Patient  Abnormal Lab   Location:  Creatinine  Quality:  Elevated  Severity:  Mild  Onset quality:  Gradual  Duration:  3 days  Timing:  Constant  Progression:  Worsening  Chronicity:  Recurrent  Context:  H/o CKD3 had labs 3 days ago creatinine at 1.4 today labs showed at 1.7 referred to ED by ID  Relieved by:  Fluids  Worsened by:  Dehydration  Ineffective treatments:  Drinking water  Associated symptoms: abdominal pain, fatigue and nausea    Associated symptoms: no chest pain, no cough, no diarrhea, no fever, no headaches, no rash, no shortness of breath and no vomiting    Risk factors:  CKD3, paf on eliquis, pancreatic mass, biliary obstruction      Review of Systems   Constitutional: Positive for appetite change and fatigue. Negative for fever.   Respiratory: Negative for cough and shortness of breath.    Cardiovascular: Negative for chest pain, palpitations and leg swelling.   Gastrointestinal: Positive for abdominal pain and nausea. Negative for blood in stool, constipation, diarrhea and vomiting.   Genitourinary: Negative for dysuria and frequency.   Skin: Negative for rash.   Neurological: Positive for dizziness and weakness. Negative for headaches.   Psychiatric/Behavioral: Positive for confusion.   All other systems reviewed and are negative.      Past Medical History:   Diagnosis Date   • Antral gastritis    • Atrial fibrillation (CMS/HCC)     treated with oral blood thinner   • Chest pain    • COPD (chronic obstructive pulmonary disease) (CMS/HCC)    • Coronary artery disease     no stents   • Diabetes mellitus (CMS/HCC)     type 2    •  Duodenitis    • Emphysema of lung (CMS/HCC)    • Gallbladder disease     removed    • GERD (gastroesophageal reflux disease)    • Hyperlipidemia    • Hypertension    • Kidney stone    • Kidney stones     had lithotripsy and passed 36 kidney stones as well as had one surgically removed.   • Malignant neoplasm of head of pancreas (CMS/HCC) 9/5/2018   • Palpitations    • Reflux esophagitis    • Renal failure     stage 3 kidney disease       Allergies   Allergen Reactions   • Contrast Dye Other (See Comments)     Can't have due to kidney failure per family       Past Surgical History:   Procedure Laterality Date   • BILE DUCT STENT PLACEMENT      [facility] 2 weeks ago    • CARDIAC CATHETERIZATION  11/01/1999   • CARDIOVASCULAR STRESS TEST  09/2012   • CHOLECYSTECTOMY N/A 8/10/2018    Procedure: CHOLECYSTECTOMY LAPAROSCOPIC;  Surgeon: Ronak Downs MD;  Location: [facility];  Service: General   • CYSTOSCOPY BLADDER STONE LITHOTRIPSY     • ECHO - CONVERTED  09/2012   • ERCP N/A 8/14/2018    Procedure: ENDOSCOPIC RETROGRADE CHOLANGIOPANCREATOGRAPHY WITH PAPILLOTOMY;  Surgeon: Scot Su MD;  Location: [facility] OR;  Service: Gastroenterology   • ERCP N/A 10/1/2018    Procedure: ENDOSCOPIC RETROGRADE CHOLANGIOPANCREATOGRAPHY;  Surgeon: Jefry Luna MD;  Location: [facility] of [facility] ENDOSCOPY;  Service: Gastroenterology   • KIDNEY STONE SURGERY     • KIDNEY STONE SURGERY      open abdominal surgery   • UPPER GASTROINTESTINAL ENDOSCOPY  08/30/2012       Family History   Problem Relation Age of Onset   • Heart attack Mother    • Heart disease Mother    • Stroke Mother    • Kidney disease Mother    • Heart failure Mother    • Lung disease Father    • Tuberculosis Father    • Diabetes Sister    • Heart attack Brother    • Heart failure Brother    • Heart disease Brother    • Diabetes Sister    • No Known Problems Brother    • No Known Problems Brother        Social History     Social History   •  Marital status:      Social History Main Topics   • Smoking status: Never Smoker   • Smokeless tobacco: Never Used   • Alcohol use No   • Drug use: No   • Sexual activity: Defer     Other Topics Concern   • Not on file     Social History Narrative    He is  and lives alone with his wife although they have 3 children together who all live close by. He is retired from [employer] and was exposed to Agent Orange during [place]. He is a lifelong nonsmoker.          Objective   Physical Exam   Constitutional: He is oriented to person, place, and time. He appears well-developed and well-nourished. No distress.   Appears uncomfortable but non toxic.    HENT:   Head: Normocephalic and atraumatic.   Right Ear: External ear normal.   Left Ear: External ear normal.   Nose: Nose normal.   Eyes: Conjunctivae are normal. No scleral icterus.   Neck: Normal range of motion. Neck supple.   Cardiovascular: Normal rate, regular rhythm and normal heart sounds.  Exam reveals no friction rub.    No murmur heard.  Pulmonary/Chest: Effort normal and breath sounds normal. No respiratory distress. He has no wheezes. He has no rales.   Abdominal: Soft. Bowel sounds are normal. He exhibits no distension. There is tenderness. There is no rebound and no guarding.   Moderate diffuse tenderness throughout RUQ and epigastrium.  Minimal tenderness to the lower abdomen.   Patient and wife confirm this is normal/baseline x weeks    Musculoskeletal: Normal range of motion. He exhibits no edema or tenderness.   Neurological: He is alert and oriented to person, place, and time.   Skin: Skin is warm and dry. No rash noted. He is not diaphoretic. No erythema. There is pallor.   Surgical sites appear well healing. No signs of infection.    Psychiatric: He has a normal mood and affect. His behavior is normal.   Nursing note and vitals reviewed.      Procedures         ED Course  Recent Results (from the past 24 hour(s))   Comprehensive  Metabolic Panel    Collection Time: 10/17/18  3:56 PM   Result Value Ref Range    Glucose 131 (H) 70 - 100 mg/dL    BUN 25 (H) 9 - 23 mg/dL    Creatinine 1.48 (H) 0.60 - 1.30 mg/dL    Sodium 130 (L) 132 - 146 mmol/L    Potassium 4.1 3.5 - 5.5 mmol/L    Chloride 94 (L) 99 - 109 mmol/L    CO2 27.0 20.0 - 31.0 mmol/L    Calcium 9.8 8.7 - 10.4 mg/dL    Total Protein 7.3 5.7 - 8.2 g/dL    Albumin 4.29 3.20 - 4.80 g/dL    ALT (SGPT) 23 7 - 40 U/L    AST (SGOT) 38 (H) 0 - 33 U/L    Alkaline Phosphatase 166 (H) 25 - 100 U/L    Total Bilirubin 0.7 0.3 - 1.2 mg/dL    eGFR Non African Amer 47 (L) >60 mL/min/1.73    Globulin 3.0 gm/dL    A/G Ratio 1.4 (L) 1.5 - 2.5 g/dL    BUN/Creatinine Ratio 16.9 7.0 - 25.0    Anion Gap 9.0 3.0 - 11.0 mmol/L   Lipase    Collection Time: 10/17/18  3:56 PM   Result Value Ref Range    Lipase 29 6 - 51 U/L   BNP    Collection Time: 10/17/18  3:56 PM   Result Value Ref Range    BNP 11.0 0.0 - 100.0 pg/mL   Light Blue Top    Collection Time: 10/17/18  3:56 PM   Result Value Ref Range    Extra Tube hold for add-on    Green Top (Gel)    Collection Time: 10/17/18  3:56 PM   Result Value Ref Range    Extra Tube Hold for add-ons.    Lavender Top    Collection Time: 10/17/18  3:56 PM   Result Value Ref Range    Extra Tube hold for add-on    Gold Top - SST    Collection Time: 10/17/18  3:56 PM   Result Value Ref Range    Extra Tube Hold for add-ons.    CBC Auto Differential    Collection Time: 10/17/18  3:56 PM   Result Value Ref Range    WBC 8.73 3.50 - 10.80 10*3/mm3    RBC 4.39 4.20 - 5.76 10*6/mm3    Hemoglobin 12.8 (L) 13.1 - 17.5 g/dL    Hematocrit 39.1 38.9 - 50.9 %    MCV 89.1 80.0 - 99.0 fL    MCH 29.2 27.0 - 31.0 pg    MCHC 32.7 32.0 - 36.0 g/dL    RDW 14.1 11.3 - 14.5 %    RDW-SD 46.1 37.0 - 54.0 fl    MPV 9.6 6.0 - 12.0 fL    Platelets 335 150 - 450 10*3/mm3    Neutrophil % 58.3 41.0 - 71.0 %    Lymphocyte % 29.8 24.0 - 44.0 %    Monocyte % 10.7 0.0 - 12.0 %    Eosinophil % 1.0 0.0 - 3.0 %     Basophil % 0.2 0.0 - 1.0 %    Immature Grans % 0.5 0.0 - 0.6 %    Neutrophils, Absolute 5.09 1.50 - 8.30 10*3/mm3    Lymphocytes, Absolute 2.60 0.60 - 4.80 10*3/mm3    Monocytes, Absolute 0.93 0.00 - 1.00 10*3/mm3    Eosinophils, Absolute 0.09 0.00 - 0.30 10*3/mm3    Basophils, Absolute 0.02 0.00 - 0.20 10*3/mm3    Immature Grans, Absolute 0.04 (H) 0.00 - 0.03 10*3/mm3   Lactic Acid, Plasma    Collection Time: 10/17/18  3:58 PM   Result Value Ref Range    Lactate 1.7 0.5 - 2.0 mmol/L   POC Troponin, Rapid    Collection Time: 10/17/18  4:01 PM   Result Value Ref Range    Troponin I 0.00 0.00 - 0.07 ng/mL     Note: In addition to lab results from this visit, the labs listed above may include labs taken at another facility or during a different encounter within the last 24 hours. Please correlate lab times with ED admission and discharge times for further clarification of the services performed during this visit.    XR Chest 1 View   Final Result   Postinflammatory bibasilar changes with no acute process.       D:  10/17/2018   E:  10/17/2018       This report was finalized on 10/17/2018 5:17 PM by Dr. Jeff Garcia MD.            Vitals:    10/17/18 1702 10/17/18 1707 10/17/18 1730 10/17/18 1800   BP:   101/87 115/75   BP Location:       Patient Position:       Pulse: 91 83 85 83   Resp:       Temp:       TempSrc:       SpO2: 94% 96% 97% 95%   Weight:       Height:         Medications   sodium chloride 0.9 % flush 10 mL (not administered)   sodium chloride 0.9 % bolus 500 mL (0 mL Intravenous Stopped 10/17/18 1731)   ondansetron (ZOFRAN) injection 4 mg (4 mg Intravenous Given 10/17/18 1702)   sodium chloride 0.9 % bolus 500 mL (0 mL Intravenous Stopped 10/17/18 1813)     ECG/EMG Results (last 24 hours)     Procedure Component Value Units Date/Time    ECG 12 Lead [[id number]] Collected:  10/17/18 1551     Updated:  10/17/18 1603        ECG 12 Lead   Final Result   Test Reason : CHEST PAIN/WEAKNESS   Blood Pressure :  **/** mmHG   Vent. Rate : 091 BPM     Atrial Rate : 091 BPM      P-R Int : 188 ms          QRS Dur : 094 ms       QT Int : 356 ms       P-R-T Axes : 014 021 035 degrees      QTc Int : 437 ms      Normal sinus rhythm   Inferior infarct (cited on or before 22-OCT-2017)   Abnormal ECG   When compared with ECG of 22-OCT-2017 11:10,   Nonspecific T wave abnormality, worse in Anterior leads   Confirmed by LOUISE STEELE MD ([id number]) on 10/17/2018 9:24:13 PM      Referred By:  IVETTE           Confirmed By:LOUISE STEELE MD            ED Course as of Oct 17 1936   Wed Oct 17, 2018   1735 Case discussed in detail with Dr. Madrid, ID who sent the patient to the ED today who agrees with IV rehydration and that the patient she be stable for outpatient follow up.   [ML]      ED Course User Index  [ML] Abdifatah Fuentes                     [facility]    Final diagnoses:   Acute on chronic renal insufficiency   Dehydration       Documentation assistance provided by tammy Fuentes.  Information recorded by the scribe was done at my direction and has been verified and validated by me.     Abdifatah Fuentes  10/17/18 1737       Abdifatah Fuentes  10/17/18 1936       Louise Steele MD  10/20/18 1720

## 2018-10-19 ENCOUNTER — OFFICE VISIT (OUTPATIENT)
Dept: ONCOLOGY | Facility: CLINIC | Age: 68
End: 2018-10-19
Attending: INTERNAL MEDICINE

## 2018-10-19 ENCOUNTER — HOSPITAL ENCOUNTER (OUTPATIENT)
Dept: INFUSION THERAPY | Facility: HOSPITAL | Age: 68
Setting detail: INFUSION SERIES
Discharge: HOME | End: 2018-10-19
Attending: INTERNAL MEDICINE

## 2018-10-19 VITALS
BODY MASS INDEX: 27.77 KG/M2 | RESPIRATION RATE: 18 BRPM | WEIGHT: 205 LBS | DIASTOLIC BLOOD PRESSURE: 78 MMHG | TEMPERATURE: 98.1 F | HEART RATE: 78 BPM | HEIGHT: 72 IN | OXYGEN SATURATION: 97 % | SYSTOLIC BLOOD PRESSURE: 134 MMHG

## 2018-10-19 DIAGNOSIS — R97.8 ELEVATED CA 19-9 LEVEL: ICD-10-CM

## 2018-10-19 DIAGNOSIS — N18.30 CKD (CHRONIC KIDNEY DISEASE), STAGE III (HCC): ICD-10-CM

## 2018-10-19 DIAGNOSIS — C25.0 MALIGNANT NEOPLASM OF HEAD OF PANCREAS (HCC): ICD-10-CM

## 2018-10-19 DIAGNOSIS — R11.2 NAUSEA AND VOMITING, INTRACTABILITY OF VOMITING NOT SPECIFIED, UNSPECIFIED VOMITING TYPE: ICD-10-CM

## 2018-10-19 DIAGNOSIS — E86.0 DEHYDRATION: ICD-10-CM

## 2018-10-19 DIAGNOSIS — G89.3 NEOPLASM RELATED PAIN: ICD-10-CM

## 2018-10-19 DIAGNOSIS — C25.0 MALIGNANT NEOPLASM OF HEAD OF PANCREAS (HCC): Primary | ICD-10-CM

## 2018-10-19 LAB
ALBUMIN SERPL-MCNC: 4.5 G/DL (ref 3.4–4.8)
ALBUMIN/GLOB SERPL: 1.3 G/DL (ref 1.5–2.5)
ALP SERPL-CCNC: 147 U/L (ref 40–129)
ALT SERPL W P-5'-P-CCNC: 34 U/L (ref 10–44)
ANION GAP SERPL CALCULATED.3IONS-SCNC: 11.1 MMOL/L (ref 3.6–11.2)
AST SERPL-CCNC: 42 U/L (ref 10–34)
BASOPHILS # BLD AUTO: 0.02 10*3/MM3 (ref 0–0.3)
BASOPHILS NFR BLD AUTO: 0.4 % (ref 0–2)
BILIRUB SERPL-MCNC: 0.7 MG/DL (ref 0.2–1.8)
BUN BLD-MCNC: 19 MG/DL (ref 7–21)
BUN/CREAT SERPL: 12.6 (ref 7–25)
CALCIUM SPEC-SCNC: 10.1 MG/DL (ref 7.7–10)
CHLORIDE SERPL-SCNC: 100 MMOL/L (ref 99–112)
CO2 SERPL-SCNC: 25.9 MMOL/L (ref 24.3–31.9)
CREAT BLD-MCNC: 1.51 MG/DL (ref 0.43–1.29)
DEPRECATED RDW RBC AUTO: 43.3 FL (ref 37–54)
EOSINOPHIL # BLD AUTO: 0.05 10*3/MM3 (ref 0–0.7)
EOSINOPHIL NFR BLD AUTO: 0.9 % (ref 0–7)
ERYTHROCYTE [DISTWIDTH] IN BLOOD BY AUTOMATED COUNT: 13.5 % (ref 11.5–14.5)
GFR SERPL CREATININE-BSD FRML MDRD: 46 ML/MIN/1.73
GLOBULIN UR ELPH-MCNC: 3.5 GM/DL
GLUCOSE BLD-MCNC: 119 MG/DL (ref 70–110)
HCT VFR BLD AUTO: 38.1 % (ref 42–52)
HGB BLD-MCNC: 12.6 G/DL (ref 14–18)
IMM GRANULOCYTES # BLD: 0.03 10*3/MM3 (ref 0–0.03)
IMM GRANULOCYTES NFR BLD: 0.5 % (ref 0–0.5)
LYMPHOCYTES # BLD AUTO: 2.16 10*3/MM3 (ref 1–3)
LYMPHOCYTES NFR BLD AUTO: 39.6 % (ref 16–46)
MCH RBC QN AUTO: 29.5 PG (ref 27–33)
MCHC RBC AUTO-ENTMCNC: 33.1 G/DL (ref 33–37)
MCV RBC AUTO: 89.2 FL (ref 80–94)
MONOCYTES # BLD AUTO: 0.65 10*3/MM3 (ref 0.1–0.9)
MONOCYTES NFR BLD AUTO: 11.9 % (ref 0–12)
NEUTROPHILS # BLD AUTO: 2.55 10*3/MM3 (ref 1.4–6.5)
NEUTROPHILS NFR BLD AUTO: 46.7 % (ref 40–75)
OSMOLALITY SERPL CALC.SUM OF ELEC: 277.2 MOSM/KG (ref 273–305)
PLATELET # BLD AUTO: 301 10*3/MM3 (ref 130–400)
PMV BLD AUTO: 9.7 FL (ref 6–10)
POTASSIUM BLD-SCNC: 4.5 MMOL/L (ref 3.5–5.3)
PROT SERPL-MCNC: 8 G/DL (ref 6–8)
RBC # BLD AUTO: 4.27 10*6/MM3 (ref 4.7–6.1)
SODIUM BLD-SCNC: 137 MMOL/L (ref 135–153)
WBC NRBC COR # BLD: 5.46 10*3/MM3 (ref 4.5–12.5)

## 2018-10-19 PROCEDURE — 80053 COMPREHEN METABOLIC PANEL: CPT | Performed by: INTERNAL MEDICINE

## 2018-10-19 PROCEDURE — 99214 OFFICE O/P EST MOD 30 MIN: CPT | Performed by: INTERNAL MEDICINE

## 2018-10-19 PROCEDURE — 96365 THER/PROPH/DIAG IV INF INIT: CPT

## 2018-10-19 PROCEDURE — 85025 COMPLETE CBC W/AUTO DIFF WBC: CPT | Performed by: INTERNAL MEDICINE

## 2018-10-19 PROCEDURE — 96368 THER/DIAG CONCURRENT INF: CPT

## 2018-10-19 PROCEDURE — 25010000002 ONDANSETRON PER 1 MG: Performed by: INTERNAL MEDICINE

## 2018-10-19 PROCEDURE — 86301 IMMUNOASSAY TUMOR CA 19-9: CPT | Performed by: INTERNAL MEDICINE

## 2018-10-19 RX ORDER — SODIUM CHLORIDE 9 MG/ML
1000 INJECTION, SOLUTION INTRAVENOUS ONCE
Status: COMPLETED | OUTPATIENT
Start: 2018-10-19 | End: 2018-10-19

## 2018-10-19 RX ORDER — SODIUM CHLORIDE 9 MG/ML
INJECTION, SOLUTION INTRAVENOUS
Status: DISCONTINUED
Start: 2018-10-19 | End: 2018-10-21 | Stop reason: HOSPADM

## 2018-10-19 RX ADMIN — SODIUM CHLORIDE 1000 ML: 9 INJECTION, SOLUTION INTRAVENOUS at 18:24

## 2018-10-19 RX ADMIN — ONDANSETRON 16 MG: 2 INJECTION INTRAMUSCULAR; INTRAVENOUS at 18:24

## 2018-10-19 NOTE — PATIENT INSTRUCTIONS
Rehydration  Rehydration is the replacement of body fluids and salts and minerals (electrolytes) that are lost during dehydration. Dehydration is when there is not enough fluid or water in the body. This happens when you lose more fluids than you take in. People who are age 65 or older have a higher risk of dehydration than younger adults. Common causes of dehydration include:  · Vomiting.  · Diarrhea.  · Excessive sweating, such as from heat exposure or exercise.  · Taking medicines that cause the body to lose excess fluid (diuretics).  · Impaired kidney function.  · Not drinking enough fluid.  · Certain illnesses or infections.  · Certain poorly controlled long-term (chronic) illnesses, such as diabetes, heart disease, and kidney disease.    Symptoms of mild dehydration may include thirst, dry lips and mouth, dry skin, and dizziness. Symptoms of severe dehydration may include increased heart rate, confusion, fainting, and not urinating.  You can rehydrate by drinking certain fluids or getting fluids through an IV tube, as told by your health care provider.  What are the risks?  Generally, rehydration is safe. However, one problem that can happen is taking in too much fluid (overhydration). This is rare. If overhydration happens, it can cause an electrolyte imbalance, kidney failure, fluid in the lungs, or a decrease in salt (sodium) levels in the body.  How to rehydrate  Follow instructions from your health care provider for rehydration. The kind of fluid you should drink and the amount you should drink depend on your condition.  · If directed by your health care provider, drink an oral rehydration solution (ORS). This is a drink designed to treat dehydration that is found in pharmacies and retail stores.  ? Make an ORS by following instructions on the package.  ? Start by drinking small amounts, about ½ cup (120 mL) every 5-10 minutes.  ? Slowly increase how much you drink until you have taken the amount  recommended by your health care provider.  · Drink enough clear fluids to keep your urine clear or pale yellow. If you were instructed to drink an ORS, finish the ORS first, then start slowly drinking other clear fluids. Drink fluids such as:  ? Water. Do not drink only water. Doing that can lead to having too little sodium in your body (hyponatremia).  ? Ice chips.  ? Fruit juice that you have added water to (diluted juice).  ? Low-calorie sports drinks.  · If you are severely dehydrated, your health care provider may recommend that you receive fluids through an IV tube in the hospital.  · Do not take sodium tablets. Doing that can lead to the condition of having too much sodium in your body (hypernatremia).    Eating while you rehydrate  Follow instructions from your health care provider about what to eat while you rehydrate. Your health care provider may recommend that you slowly begin eating regular foods in small amounts.  · Eat foods that contain a healthy balance of electrolytes, such as bananas, oranges, potatoes, tomatoes, and spinach.  · Avoid foods that are greasy or contain a lot of fat or sugar.    In some cases, you may get nutrition through a feeding tube that is passed through your nose and into your stomach (nasogastric tube, or NG tube). This may be done if you have uncontrolled vomiting or diarrhea.  Beverages to avoid  Certain beverages may make dehydration worse. While you rehydrate, avoid:  · Alcohol.  · Caffeine.  · Drinks that contain a lot of sugar. These include:  ? High-calorie sports drinks.  ? Fruit juice that is not diluted.  ? Soda.    Check nutrition labels to see how much sugar or caffeine a beverage contains.  Signs of dehydration recovery  You may be recovering from dehydration if:  · You urinate more often than you did before you started rehydrating.  · Your urine is clear or pale yellow.  · Your energy level improves.  · You vomit less frequently.  · You have diarrhea less  frequently.  · Your appetite improves or returns to normal.  · You feel less dizzy or less light-headed.  · Your skin tone and color start to look more normal.    Contact a health care provider if:  · You continue to have symptoms of mild dehydration, such as:  ? Thirst.  ? Dry lips.  ? Slightly dry mouth.  ? Dry, warm skin.  ? Dizziness.  · You continue to vomit or have diarrhea.  Get help right away if:  · You have symptoms of dehydration that get worse.  · You feel:  ? Confused.  ? Weak.  ? Like you are going to faint.  · You have not urinated in 6-8 hours.  · You have very dark urine.  · You have trouble breathing.  · Your heart rate while sitting still is over 100 beats a minute.  · You cannot drink fluids without vomiting.  · You have vomiting or diarrhea that:  ? Gets worse.  ? Does not go away.  · You have a fever.  This information is not intended to replace advice given to you by your health care provider. Make sure you discuss any questions you have with your health care provider.  Document Released: 03/11/2013 Document Revised: 07/07/2017 Document Reviewed: 02/10/2017  [employer] Interactive Patient Education © 2018 [employer]  Ondansetron injection  What is this medicine?  ONDANSETRON (on DAN se alex) is used to treat nausea and vomiting caused by chemotherapy. It is also used to prevent or treat nausea and vomiting after surgery.  This medicine may be used for other purposes; ask your health care provider or pharmacist if you have questions.  COMMON BRAND NAME(S): Zofran  What should I tell my health care provider before I take this medicine?  They need to know if you have any of these conditions:  -heart disease  -history of irregular heartbeat  -liver disease  -low levels of magnesium or potassium in the blood  -an unusual or allergic reaction to ondansetron, granisetron, other medicines, foods, dyes, or preservatives  -pregnant or trying to get pregnant  -breast-feeding  How should I use this  medicine?  This medicine is for infusion into a vein. It is given by a health care professional in a hospital or clinic setting.  Talk to your pediatrician regarding the use of this medicine in children. Special care may be needed.  Overdosage: If you think you have taken too much of this medicine contact a poison control center or emergency room at once.  NOTE: This medicine is only for you. Do not share this medicine with others.  What if I miss a dose?  This does not apply.  What may interact with this medicine?  Do not take this medicine with any of the following medications:  -apomorphine  -certain medicines for fungal infections like fluconazole, itraconazole, ketoconazole, posaconazole, voriconazole  -cisapride  -dofetilide  -dronedarone  -pimozide  -thioridazine  -ziprasidone  This medicine may also interact with the following medications:  -carbamazepine  -certain medicines for depression, anxiety, or psychotic disturbances  -fentanyl  -linezolid  -MAOIs like Carbex, Eldepryl, Marplan, Nardil, and Parnate  -methylene blue (injected into a vein)  -other medicines that prolong the QT interval (cause an abnormal heart rhythm)  -phenytoin  -rifampicin  -tramadol  This list may not describe all possible interactions. Give your health care provider a list of all the medicines, herbs, non-prescription drugs, or dietary supplements you use. Also tell them if you smoke, drink alcohol, or use illegal drugs. Some items may interact with your medicine.  What should I watch for while using this medicine?  Your condition will be monitored carefully while you are receiving this medicine.  What side effects may I notice from receiving this medicine?  Side effects that you should report to your doctor or health care professional as soon as possible:  -allergic reactions like skin rash, itching or hives, swelling of the face, lips, or tongue  -breathing problems  -confusion  -dizziness  -fast or irregular heartbeat  -feeling  faint or lightheaded, falls  -fever and chills  -loss of balance or coordination  -seizures  -sweating  -swelling of the hands and feet  -tightness in the chest  -tremors  -unusually weak or tired  Side effects that usually do not require medical attention (report to your doctor or health care professional if they continue or are bothersome):  -constipation or diarrhea  -headache  This list may not describe all possible side effects. Call your doctor for medical advice about side effects. You may report side effects to FDA at 5-215-FDA-1729.  Where should I keep my medicine?  This drug is given in a hospital or clinic and will not be stored at home.  NOTE: This sheet is a summary. It may not cover all possible information. If you have questions about this medicine, talk to your doctor, pharmacist, or health care provider.  © 2018 [employer] Standard (2014-09-24 16:18:28)

## 2018-10-21 LAB — CANCER AG19-9 SERPL-ACNC: 5149 U/ML (ref 0–35)

## 2018-10-22 ENCOUNTER — READMISSION MANAGEMENT (OUTPATIENT)
Dept: CALL CENTER | Facility: HOSPITAL | Age: 68
End: 2018-10-22

## 2018-10-22 ENCOUNTER — TELEPHONE (OUTPATIENT)
Dept: ONCOLOGY | Facility: HOSPITAL | Age: 68
End: 2018-10-22

## 2018-10-22 NOTE — OUTREACH NOTE
Sepsis Week 3 Survey      Responses   Facility patient discharged from?  [place]   Does the patient have one of the following disease processes/diagnoses(primary or secondary)?  Sepsis   Week 3 attempt successful?  No   Unsuccessful attempts  Attempt 1          Jayne Anderson RN

## 2018-10-22 NOTE — TELEPHONE ENCOUNTER
----- Message from Aletha Lucero MA sent at 10/22/2018 11:27 AM EDT -----  Contact: [phone]  Is it ok for Todd to get a flu shot today?  He goes in for port placement and has Chemo tomorrow.  Or should he wait a few days?    Thank you

## 2018-10-23 ENCOUNTER — ANESTHESIA (OUTPATIENT)
Dept: PERIOP | Facility: HOSPITAL | Age: 68
End: 2018-10-23

## 2018-10-23 ENCOUNTER — APPOINTMENT (OUTPATIENT)
Dept: GENERAL RADIOLOGY | Facility: HOSPITAL | Age: 68
End: 2018-10-23

## 2018-10-23 ENCOUNTER — ANESTHESIA EVENT (OUTPATIENT)
Dept: PERIOP | Facility: HOSPITAL | Age: 68
End: 2018-10-23

## 2018-10-23 ENCOUNTER — HOSPITAL ENCOUNTER (OUTPATIENT)
Facility: HOSPITAL | Age: 68
Setting detail: HOSPITAL OUTPATIENT SURGERY
Discharge: HOME | End: 2018-10-23
Attending: SURGERY | Admitting: SURGERY

## 2018-10-23 ENCOUNTER — APPOINTMENT (OUTPATIENT)
Dept: ONCOLOGY | Facility: HOSPITAL | Age: 68
End: 2018-10-23
Attending: INTERNAL MEDICINE

## 2018-10-23 ENCOUNTER — INFUSION (OUTPATIENT)
Dept: ONCOLOGY | Facility: HOSPITAL | Age: 68
End: 2018-10-23
Attending: INTERNAL MEDICINE

## 2018-10-23 VITALS
RESPIRATION RATE: 15 BRPM | BODY MASS INDEX: 27.77 KG/M2 | WEIGHT: 205 LBS | HEIGHT: 72 IN | TEMPERATURE: 97.2 F | OXYGEN SATURATION: 98 % | HEART RATE: 90 BPM | SYSTOLIC BLOOD PRESSURE: 126 MMHG | DIASTOLIC BLOOD PRESSURE: 84 MMHG

## 2018-10-23 VITALS
TEMPERATURE: 97.6 F | OXYGEN SATURATION: 95 % | SYSTOLIC BLOOD PRESSURE: 106 MMHG | RESPIRATION RATE: 18 BRPM | HEART RATE: 97 BPM | WEIGHT: 205.1 LBS | DIASTOLIC BLOOD PRESSURE: 72 MMHG | BODY MASS INDEX: 27.82 KG/M2

## 2018-10-23 DIAGNOSIS — C25.0 MALIGNANT NEOPLASM OF HEAD OF PANCREAS (HCC): Primary | ICD-10-CM

## 2018-10-23 DIAGNOSIS — C25.0 MALIGNANT NEOPLASM OF HEAD OF PANCREAS (HCC): ICD-10-CM

## 2018-10-23 LAB — GLUCOSE BLDC GLUCOMTR-MCNC: 126 MG/DL (ref 70–130)

## 2018-10-23 PROCEDURE — 76000 FLUOROSCOPY <1 HR PHYS/QHP: CPT

## 2018-10-23 PROCEDURE — 71045 X-RAY EXAM CHEST 1 VIEW: CPT | Performed by: RADIOLOGY

## 2018-10-23 PROCEDURE — C1788 PORT, INDWELLING, IMP: HCPCS | Performed by: SURGERY

## 2018-10-23 PROCEDURE — 25010000002 FENTANYL CITRATE (PF) 100 MCG/2ML SOLUTION: Performed by: NURSE ANESTHETIST, CERTIFIED REGISTERED

## 2018-10-23 PROCEDURE — 96375 TX/PRO/DX INJ NEW DRUG ADDON: CPT

## 2018-10-23 PROCEDURE — 36561 INSERT TUNNELED CV CATH: CPT | Performed by: SURGERY

## 2018-10-23 PROCEDURE — 82962 GLUCOSE BLOOD TEST: CPT

## 2018-10-23 PROCEDURE — 76937 US GUIDE VASCULAR ACCESS: CPT | Performed by: SURGERY

## 2018-10-23 PROCEDURE — 77001 FLUOROGUIDE FOR VEIN DEVICE: CPT | Performed by: SURGERY

## 2018-10-23 PROCEDURE — 25010000002 ONDANSETRON PER 1 MG: Performed by: ANESTHESIOLOGY

## 2018-10-23 PROCEDURE — 25010000003 CEFAZOLIN SODIUM-DEXTROSE 2-3 GM-%(50ML) RECONSTITUTED SOLUTION: Performed by: NURSE ANESTHETIST, CERTIFIED REGISTERED

## 2018-10-23 PROCEDURE — 25010000002 DEXAMETHASONE SODIUM PHOSPHATE 20 MG/5ML SOLUTION 5 ML VIAL: Performed by: INTERNAL MEDICINE

## 2018-10-23 PROCEDURE — 25010000002 GEMCITABINE HCL 2 GM/20ML SOLUTION 20 ML VIAL: Performed by: INTERNAL MEDICINE

## 2018-10-23 PROCEDURE — 96417 CHEMO IV INFUS EACH ADDL SEQ: CPT

## 2018-10-23 PROCEDURE — 25010000002 PACLITAXEL PROTEIN-BOUND PART PER 1 MG: Performed by: INTERNAL MEDICINE

## 2018-10-23 PROCEDURE — 96413 CHEMO IV INFUSION 1 HR: CPT

## 2018-10-23 PROCEDURE — 25010000002 PROPOFOL 10 MG/ML EMULSION: Performed by: NURSE ANESTHETIST, CERTIFIED REGISTERED

## 2018-10-23 PROCEDURE — 71045 X-RAY EXAM CHEST 1 VIEW: CPT

## 2018-10-23 PROCEDURE — 25010000002 PHENYLEPHRINE PER 1 ML: Performed by: NURSE ANESTHETIST, CERTIFIED REGISTERED

## 2018-10-23 DEVICE — VACCESS CT POWER-INJECTABLE IMPLANTABLE PORT (WITH SUTURE PLUGS) (8F)
Type: IMPLANTABLE DEVICE | Status: FUNCTIONAL
Brand: VACCESS

## 2018-10-23 RX ORDER — FAMOTIDINE 10 MG/ML
INJECTION, SOLUTION INTRAVENOUS AS NEEDED
Status: DISCONTINUED | OUTPATIENT
Start: 2018-10-23 | End: 2018-10-23 | Stop reason: SURG

## 2018-10-23 RX ORDER — SODIUM CHLORIDE 0.9 % (FLUSH) 0.9 %
3-10 SYRINGE (ML) INJECTION AS NEEDED
Status: DISCONTINUED | OUTPATIENT
Start: 2018-10-23 | End: 2018-10-23 | Stop reason: HOSPADM

## 2018-10-23 RX ORDER — MAGNESIUM HYDROXIDE 1200 MG/15ML
LIQUID ORAL AS NEEDED
Status: DISCONTINUED | OUTPATIENT
Start: 2018-10-23 | End: 2018-10-23 | Stop reason: HOSPADM

## 2018-10-23 RX ORDER — CEFAZOLIN SODIUM 2 G/50ML
SOLUTION INTRAVENOUS AS NEEDED
Status: DISCONTINUED | OUTPATIENT
Start: 2018-10-23 | End: 2018-10-23 | Stop reason: SURG

## 2018-10-23 RX ORDER — PROPOFOL 10 MG/ML
VIAL (ML) INTRAVENOUS AS NEEDED
Status: DISCONTINUED | OUTPATIENT
Start: 2018-10-23 | End: 2018-10-23 | Stop reason: SURG

## 2018-10-23 RX ORDER — BUPIVACAINE HYDROCHLORIDE AND EPINEPHRINE 5; 5 MG/ML; UG/ML
INJECTION, SOLUTION PERINEURAL AS NEEDED
Status: DISCONTINUED | OUTPATIENT
Start: 2018-10-23 | End: 2018-10-23 | Stop reason: HOSPADM

## 2018-10-23 RX ORDER — SODIUM CHLORIDE 9 MG/ML
INJECTION, SOLUTION INTRAVENOUS
Status: DISPENSED
Start: 2018-10-23

## 2018-10-23 RX ORDER — FENTANYL CITRATE 50 UG/ML
INJECTION, SOLUTION INTRAMUSCULAR; INTRAVENOUS AS NEEDED
Status: DISCONTINUED | OUTPATIENT
Start: 2018-10-23 | End: 2018-10-23 | Stop reason: SURG

## 2018-10-23 RX ORDER — LIDOCAINE HYDROCHLORIDE 20 MG/ML
INJECTION, SOLUTION EPIDURAL; INFILTRATION; INTRACAUDAL; PERINEURAL AS NEEDED
Status: DISCONTINUED | OUTPATIENT
Start: 2018-10-23 | End: 2018-10-23 | Stop reason: SURG

## 2018-10-23 RX ORDER — SODIUM CHLORIDE 0.9 % (FLUSH) 0.9 %
10 SYRINGE (ML) INJECTION AS NEEDED
Status: CANCELLED | OUTPATIENT
Start: 2018-10-23

## 2018-10-23 RX ORDER — CEFAZOLIN SODIUM 2 G/50ML
2 SOLUTION INTRAVENOUS ONCE
Status: DISCONTINUED | OUTPATIENT
Start: 2018-10-23 | End: 2018-10-23 | Stop reason: HOSPADM

## 2018-10-23 RX ORDER — SODIUM CHLORIDE, SODIUM LACTATE, POTASSIUM CHLORIDE, CALCIUM CHLORIDE 600; 310; 30; 20 MG/100ML; MG/100ML; MG/100ML; MG/100ML
125 INJECTION, SOLUTION INTRAVENOUS CONTINUOUS
Status: DISCONTINUED | OUTPATIENT
Start: 2018-10-23 | End: 2018-10-23 | Stop reason: HOSPADM

## 2018-10-23 RX ORDER — MEPERIDINE HYDROCHLORIDE 25 MG/ML
12.5 INJECTION INTRAMUSCULAR; INTRAVENOUS; SUBCUTANEOUS
Status: DISCONTINUED | OUTPATIENT
Start: 2018-10-23 | End: 2018-10-23 | Stop reason: HOSPADM

## 2018-10-23 RX ORDER — ONDANSETRON 2 MG/ML
4 INJECTION INTRAMUSCULAR; INTRAVENOUS ONCE AS NEEDED
Status: DISCONTINUED | OUTPATIENT
Start: 2018-10-23 | End: 2018-10-23 | Stop reason: HOSPADM

## 2018-10-23 RX ORDER — LIDOCAINE AND PRILOCAINE 25; 25 MG/G; MG/G
CREAM TOPICAL
Qty: 30 G | Refills: 5 | Status: SHIPPED | OUTPATIENT
Start: 2018-10-23 | End: 2018-12-07

## 2018-10-23 RX ORDER — ONDANSETRON 2 MG/ML
INJECTION INTRAMUSCULAR; INTRAVENOUS AS NEEDED
Status: DISCONTINUED | OUTPATIENT
Start: 2018-10-23 | End: 2018-10-23 | Stop reason: SURG

## 2018-10-23 RX ORDER — IPRATROPIUM BROMIDE AND ALBUTEROL SULFATE 2.5; .5 MG/3ML; MG/3ML
3 SOLUTION RESPIRATORY (INHALATION) ONCE AS NEEDED
Status: DISCONTINUED | OUTPATIENT
Start: 2018-10-23 | End: 2018-10-23 | Stop reason: HOSPADM

## 2018-10-23 RX ORDER — SODIUM CHLORIDE 0.9 % (FLUSH) 0.9 %
3 SYRINGE (ML) INJECTION EVERY 12 HOURS SCHEDULED
Status: DISCONTINUED | OUTPATIENT
Start: 2018-10-23 | End: 2018-10-23 | Stop reason: HOSPADM

## 2018-10-23 RX ORDER — OXYCODONE HYDROCHLORIDE AND ACETAMINOPHEN 5; 325 MG/1; MG/1
1 TABLET ORAL ONCE AS NEEDED
Status: DISCONTINUED | OUTPATIENT
Start: 2018-10-23 | End: 2018-10-23 | Stop reason: HOSPADM

## 2018-10-23 RX ORDER — FENTANYL CITRATE 50 UG/ML
50 INJECTION, SOLUTION INTRAMUSCULAR; INTRAVENOUS
Status: DISCONTINUED | OUTPATIENT
Start: 2018-10-23 | End: 2018-10-23 | Stop reason: HOSPADM

## 2018-10-23 RX ORDER — PACLITAXEL 100 MG/20ML
270 INJECTION, POWDER, LYOPHILIZED, FOR SUSPENSION INTRAVENOUS ONCE
Status: COMPLETED | OUTPATIENT
Start: 2018-10-23 | End: 2018-10-23

## 2018-10-23 RX ADMIN — FENTANYL CITRATE 50 MCG: 50 INJECTION INTRAMUSCULAR; INTRAVENOUS at 08:36

## 2018-10-23 RX ADMIN — SODIUM CHLORIDE 1000 ML: 9 INJECTION, SOLUTION INTRAVENOUS at 11:15

## 2018-10-23 RX ADMIN — SODIUM CHLORIDE, POTASSIUM CHLORIDE, SODIUM LACTATE AND CALCIUM CHLORIDE 125 ML/HR: 600; 310; 30; 20 INJECTION, SOLUTION INTRAVENOUS at 07:40

## 2018-10-23 RX ADMIN — FAMOTIDINE 20 MG: 10 INJECTION, SOLUTION INTRAVENOUS at 08:46

## 2018-10-23 RX ADMIN — CEFAZOLIN SODIUM 2 G: 2 SOLUTION INTRAVENOUS at 08:36

## 2018-10-23 RX ADMIN — SODIUM CHLORIDE, PRESERVATIVE FREE 500 UNITS: 5 INJECTION INTRAVENOUS at 13:20

## 2018-10-23 RX ADMIN — GEMCITABINE 2150 MG: 100 INJECTION, SOLUTION INTRAVENOUS at 12:10

## 2018-10-23 RX ADMIN — DEXAMETHASONE SODIUM PHOSPHATE 12 MG: 4 INJECTION, SOLUTION INTRAMUSCULAR; INTRAVENOUS at 11:15

## 2018-10-23 RX ADMIN — PHENYLEPHRINE HYDROCHLORIDE 100 MCG: 10 INJECTION, SOLUTION INTRAMUSCULAR; INTRAVENOUS; SUBCUTANEOUS at 08:59

## 2018-10-23 RX ADMIN — ONDANSETRON 4 MG: 2 INJECTION, SOLUTION INTRAMUSCULAR; INTRAVENOUS at 07:49

## 2018-10-23 RX ADMIN — LIDOCAINE HYDROCHLORIDE 50 MG: 20 INJECTION, SOLUTION EPIDURAL; INFILTRATION; INTRACAUDAL; PERINEURAL at 08:43

## 2018-10-23 RX ADMIN — PHENYLEPHRINE HYDROCHLORIDE 100 MCG: 10 INJECTION, SOLUTION INTRAMUSCULAR; INTRAVENOUS; SUBCUTANEOUS at 08:49

## 2018-10-23 RX ADMIN — EPHEDRINE SULFATE 10 MG: 50 INJECTION INTRAMUSCULAR; INTRAVENOUS; SUBCUTANEOUS at 08:45

## 2018-10-23 RX ADMIN — PACLITAXEL 270 MG: 100 INJECTION, POWDER, LYOPHILIZED, FOR SUSPENSION INTRAVENOUS at 11:30

## 2018-10-23 RX ADMIN — FENTANYL CITRATE 50 MCG: 50 INJECTION INTRAMUSCULAR; INTRAVENOUS at 08:40

## 2018-10-23 RX ADMIN — PROPOFOL 70 MG: 10 INJECTION, EMULSION INTRAVENOUS at 08:43

## 2018-10-23 NOTE — ANESTHESIA PREPROCEDURE EVALUATION
Anesthesia Evaluation     Patient summary reviewed and Nursing notes reviewed   no history of anesthetic complications:  NPO Solid Status: > 8 hours  NPO Liquid Status: > 8 hours           Airway   Mallampati: II  TM distance: >3 FB  Neck ROM: full  no difficulty expected  Dental - normal exam   (+) implants    Pulmonary - normal exam   (+) COPD, asthma (child), sleep apnea,   (-) not a smoker  Cardiovascular - normal exam  Exercise tolerance: good (4-7 METS)    NYHA Classification: II  PT is on anticoagulation therapy  Patient on routine beta blocker and Beta blocker given within 24 hours of surgery    (+) hypertension, CAD, dysrhythmias Atrial Fib, hyperlipidemia,   (-) past MI, angina, CHF      Neuro/Psych  (+) psychiatric history Depression and Anxiety,     (-) seizures, CVA  GI/Hepatic/Renal/Endo    (+) obesity,  GERD,  renal disease CRI and stones, diabetes mellitus,   (-) morbid obesity, liver disease, hypothyroidism    Musculoskeletal (-) negative ROS    Abdominal  - normal exam    Bowel sounds: normal.   Substance History - negative use     OB/GYN negative ob/gyn ROS         Other      history of cancer                   Anesthesia Evaluation     Patient summary reviewed and Nursing notes reviewed   NPO Solid Status: > 8 hours  NPO Liquid Status: > 4 hours           Airway   Mallampati: II  TM distance: >3 FB  Neck ROM: full  No difficulty expected  Dental    (+) implants    Pulmonary - normal exam   (+) COPD, sleep apnea,   Cardiovascular - normal exam    ECG reviewed    (+) hypertension, dysrhythmias Atrial Fib, hyperlipidemia,   (-) angina, FAIR    ROS comment: Negative stress 2017  Normal EF    Neuro/Psych  (+) psychiatric history Anxiety and Depression,     GI/Hepatic/Renal/Endo    (+)  GERD poorly controlled,  liver disease (elevated LFTS), diabetes mellitus,   (-) no renal disease, hypothyroidism    Musculoskeletal     Abdominal    Substance History      OB/GYN          Other      history of cancer  (pancreatic cancer)                  Anesthesia Plan    ASA 4     general   Rapid sequence  intravenous induction   Anesthetic plan, all risks, benefits, and alternatives have been provided, discussed and informed consent has been obtained with: patient.    Plan discussed with CRNA.        Anesthesia Plan    ASA 3     general     intravenous induction   Anesthetic plan, all risks, benefits, and alternatives have been provided, discussed and informed consent has been obtained with: patient.  Use of blood products discussed with patient  Consented to blood products.

## 2018-10-23 NOTE — ANESTHESIA POSTPROCEDURE EVALUATION
Patient: Todd Phillips    Procedure Summary     Date:  10/23/18 Room / Location:  [facility] OR  /  [facility] OR    Anesthesia Start:  0836 Anesthesia Stop:  0909    Procedure:  INSERTION OF PORTACATH (Right ) Diagnosis:       Malignant neoplasm of head of pancreas (CMS/HCC)      (Malignant neoplasm of head of pancreas (CMS/HCC) [C25.0])    Surgeon:  Ronak Downs MD Provider:  Franki Scott MD    Anesthesia Type:  general ASA Status:  3          Anesthesia Type: general  Last vitals  BP   109/74 (10/23/18 0919)   Temp   97 °F (36.1 °C) (10/23/18 0909)   Pulse   83 (10/23/18 0919)   Resp   10 (10/23/18 0919)     SpO2   97 % (10/23/18 0919)     Post Anesthesia Care and Evaluation    Patient location during evaluation: PHASE II  Patient participation: complete - patient participated  Level of consciousness: awake and alert  Pain score: 1  Pain management: adequate  Airway patency: patent  Anesthetic complications: No anesthetic complications  PONV Status: controlled  Cardiovascular status: acceptable  Respiratory status: acceptable  Hydration status: acceptable

## 2018-10-23 NOTE — PATIENT INSTRUCTIONS
Nanoparticle Albumin-Bound Paclitaxel injection  What is this medicine?  NANOPARTICLE ALBUMIN-BOUND PACLITAXEL (Na no PAHR ti gracie al BYOO muhn-bound ADA li TAX el) is a chemotherapy drug. It targets fast dividing cells, like cancer cells, and causes these cells to die. This medicine is used to treat advanced breast cancer and advanced lung cancer.  This medicine may be used for other purposes; ask your health care provider or pharmacist if you have questions.  COMMON BRAND NAME(S): Abraxane  What should I tell my health care provider before I take this medicine?  They need to know if you have any of these conditions:  -kidney disease  -liver disease  -low blood counts, like low platelets, red blood cells, or white blood cells  -recent or ongoing radiation therapy  -an unusual or allergic reaction to paclitaxel, albumin, other chemotherapy, other medicines, foods, dyes, or preservatives  -pregnant or trying to get pregnant  -breast-feeding  How should I use this medicine?  This drug is given as an infusion into a vein. It is administered in a hospital or clinic by a specially trained health care professional.  Talk to your pediatrician regarding the use of this medicine in children. Special care may be needed.  Overdosage: If you think you have taken too much of this medicine contact a poison control center or emergency room at once.  NOTE: This medicine is only for you. Do not share this medicine with others.  What if I miss a dose?  It is important not to miss your dose. Call your doctor or health care professional if you are unable to keep an appointment.  What may interact with this medicine?  -cyclosporine  -diazepam  -ketoconazole  -medicines to increase blood counts like filgrastim, pegfilgrastim, sargramostim  -other chemotherapy drugs like cisplatin, doxorubicin, epirubicin, etoposide, teniposide, vincristine  -quinidine  -testosterone  -vaccines  -verapamil  Talk to your doctor or health care professional  before taking any of these medicines:  -acetaminophen  -aspirin  -ibuprofen  -ketoprofen  -naproxen  This list may not describe all possible interactions. Give your health care provider a list of all the medicines, herbs, non-prescription drugs, or dietary supplements you use. Also tell them if you smoke, drink alcohol, or use illegal drugs. Some items may interact with your medicine.  What should I watch for while using this medicine?  Your condition will be monitored carefully while you are receiving this medicine. You will need important blood work done while you are taking this medicine.  This medicine can cause serious allergic reactions. If you experience allergic reactions like skin rash, itching or hives, swelling of the face, lips, or tongue, tell your doctor or health care professional right away.  In some cases, you may be given additional medicines to help with side effects. Follow all directions for their use.  This drug may make you feel generally unwell. This is not uncommon, as chemotherapy can affect healthy cells as well as cancer cells. Report any side effects. Continue your course of treatment even though you feel ill unless your doctor tells you to stop.  Call your doctor or health care professional for advice if you get a fever, chills or sore throat, or other symptoms of a cold or flu. Do not treat yourself. This drug decreases your body's ability to fight infections. Try to avoid being around people who are sick.  This medicine may increase your risk to bruise or bleed. Call your doctor or health care professional if you notice any unusual bleeding.  Be careful brushing and flossing your teeth or using a toothpick because you may get an infection or bleed more easily. If you have any dental work done, tell your dentist you are receiving this medicine.  Avoid taking products that contain aspirin, acetaminophen, ibuprofen, naproxen, or ketoprofen unless instructed by your doctor. These  medicines may hide a fever.  Do not become pregnant while taking this medicine. Women should inform their doctor if they wish to become pregnant or think they might be pregnant. There is a potential for serious side effects to an unborn child. Talk to your health care professional or pharmacist for more information. Do not breast-feed an infant while taking this medicine.  Men are advised not to father a child while receiving this medicine.  What side effects may I notice from receiving this medicine?  Side effects that you should report to your doctor or health care professional as soon as possible:  -allergic reactions like skin rash, itching or hives, swelling of the face, lips, or tongue  -low blood counts - This drug may decrease the number of white blood cells, red blood cells and platelets. You may be at increased risk for infections and bleeding.  -signs of infection - fever or chills, cough, sore throat, pain or difficulty passing urine  -signs of decreased platelets or bleeding - bruising, pinpoint red spots on the skin, black, tarry stools, nosebleeds  -signs of decreased red blood cells - unusually weak or tired, fainting spells, lightheadedness  -breathing problems  -changes in vision  -chest pain  -high or low blood pressure  -mouth sores  -nausea and vomiting  -pain, swelling, redness or irritation at the injection site  -pain, tingling, numbness in the hands or feet  -slow or irregular heartbeat  -swelling of the ankle, feet, hands  Side effects that usually do not require medical attention (report to your doctor or health care professional if they continue or are bothersome):  -aches, pains  -changes in the color of fingernails  -diarrhea  -hair loss  -loss of appetite  This list may not describe all possible side effects. Call your doctor for medical advice about side effects. You may report side effects to FDA at 5-434-FDA-1679.  Where should I keep my medicine?  This drug is given in a hospital  or clinic and will not be stored at home.  NOTE: This sheet is a summary. It may not cover all possible information. If you have questions about this medicine, talk to your doctor, pharmacist, or health care provider.  © 2018 [employer]/Gold Standard (2016-10-19 10:05:20)  Gemcitabine injection  What is this medicine?  GEMCITABINE (tray SIT a been) is a chemotherapy drug. This medicine is used to treat many types of cancer like breast cancer, lung cancer, pancreatic cancer, and ovarian cancer.  This medicine may be used for other purposes; ask your health care provider or pharmacist if you have questions.  COMMON BRAND NAME(S): Gemzar  What should I tell my health care provider before I take this medicine?  They need to know if you have any of these conditions:  -blood disorders  -infection  -kidney disease  -liver disease  -recent or ongoing radiation therapy  -an unusual or allergic reaction to gemcitabine, other chemotherapy, other medicines, foods, dyes, or preservatives  -pregnant or trying to get pregnant  -breast-feeding  How should I use this medicine?  This drug is given as an infusion into a vein. It is administered in a hospital or clinic by a specially trained health care professional.  Talk to your pediatrician regarding the use of this medicine in children. Special care may be needed.  Overdosage: If you think you have taken too much of this medicine contact a poison control center or emergency room at once.  NOTE: This medicine is only for you. Do not share this medicine with others.  What if I miss a dose?  It is important not to miss your dose. Call your doctor or health care professional if you are unable to keep an appointment.  What may interact with this medicine?  -medicines to increase blood counts like filgrastim, pegfilgrastim, sargramostim  -some other chemotherapy drugs like cisplatin  -vaccines  Talk to your doctor or health care professional before taking any of these  medicines:  -acetaminophen  -aspirin  -ibuprofen  -ketoprofen  -naproxen  This list may not describe all possible interactions. Give your health care provider a list of all the medicines, herbs, non-prescription drugs, or dietary supplements you use. Also tell them if you smoke, drink alcohol, or use illegal drugs. Some items may interact with your medicine.  What should I watch for while using this medicine?  Visit your doctor for checks on your progress. This drug may make you feel generally unwell. This is not uncommon, as chemotherapy can affect healthy cells as well as cancer cells. Report any side effects. Continue your course of treatment even though you feel ill unless your doctor tells you to stop.  In some cases, you may be given additional medicines to help with side effects. Follow all directions for their use.  Call your doctor or health care professional for advice if you get a fever, chills or sore throat, or other symptoms of a cold or flu. Do not treat yourself. This drug decreases your body's ability to fight infections. Try to avoid being around people who are sick.  This medicine may increase your risk to bruise or bleed. Call your doctor or health care professional if you notice any unusual bleeding.  Be careful brushing and flossing your teeth or using a toothpick because you may get an infection or bleed more easily. If you have any dental work done, tell your dentist you are receiving this medicine.  Avoid taking products that contain aspirin, acetaminophen, ibuprofen, naproxen, or ketoprofen unless instructed by your doctor. These medicines may hide a fever.  Women should inform their doctor if they wish to become pregnant or think they might be pregnant. There is a potential for serious side effects to an unborn child. Talk to your health care professional or pharmacist for more information. Do not breast-feed an infant while taking this medicine.  What side effects may I notice from  receiving this medicine?  Side effects that you should report to your doctor or health care professional as soon as possible:  -allergic reactions like skin rash, itching or hives, swelling of the face, lips, or tongue  -low blood counts - this medicine may decrease the number of white blood cells, red blood cells and platelets. You may be at increased risk for infections and bleeding.  -signs of infection - fever or chills, cough, sore throat, pain or difficulty passing urine  -signs of decreased platelets or bleeding - bruising, pinpoint red spots on the skin, black, tarry stools, blood in the urine  -signs of decreased red blood cells - unusually weak or tired, fainting spells, lightheadedness  -breathing problems  -chest pain  -mouth sores  -nausea and vomiting  -pain, swelling, redness at site where injected  -pain, tingling, numbness in the hands or feet  -stomach pain  -swelling of ankles, feet, hands  -unusual bleeding  Side effects that usually do not require medical attention (report to your doctor or health care professional if they continue or are bothersome):  -constipation  -diarrhea  -hair loss  -loss of appetite  -stomach upset  This list may not describe all possible side effects. Call your doctor for medical advice about side effects. You may report side effects to FDA at 6-438-FDA-5872.  Where should I keep my medicine?  This drug is given in a hospital or clinic and will not be stored at home.  NOTE: This sheet is a summary. It may not cover all possible information. If you have questions about this medicine, talk to your doctor, pharmacist, or health care provider.  © 2018 [employer]vier/Gold Standard (2009-04-28 18:45:54)

## 2018-10-23 NOTE — H&P (VIEW-ONLY)
Subjective   Todd Phillips is a 68 y.o. male is being seen for consultation today at the request of Adiel Denney MD    Todd Phillips is a 68 y.o. male 68-year-old male with pancreatic cancer need for chemotherapy access.  He has had intermittent bouts of sepsis that is delayed port placement and biopsy for pathologic diagnosis.  He has a bare-metal stent in place now and will undergo chemotherapy next week.  Port will be placed on day of first treatment.        Past Medical History:   Diagnosis Date   • Abnormal ECG    • Antral gastritis    • Atrial fibrillation (CMS/HCC)     treated with oral blood thinner   • Chest pain    • COPD (chronic obstructive pulmonary disease) (CMS/HCC)    • Coronary artery disease     no stents   • Diabetes mellitus (CMS/HCC)     type 2    • Duodenitis    • Emphysema of lung (CMS/HCC)    • Gallbladder disease     removed    • GERD (gastroesophageal reflux disease)    • Hyperlipidemia    • Hypertension    • Kidney stone    • Kidney stones     had lithotripsy and passed 36 kidney stones as well as had one surgically removed.   • Malignant neoplasm of head of pancreas (CMS/HCC) 9/5/2018   • Palpitations    • Reflux esophagitis    • Renal failure     stage 3 kidney disease       Family History   Problem Relation Age of Onset   • Heart attack Mother    • Heart disease Mother    • Stroke Mother    • Kidney disease Mother    • Heart failure Mother    • Lung disease Father    • Tuberculosis Father    • Diabetes Sister    • Heart attack Brother    • Heart failure Brother    • Heart disease Brother    • Diabetes Sister    • No Known Problems Brother    • No Known Problems Brother        Social History     Social History   • Marital status:      Spouse name: N/A   • Number of children: N/A   • Years of education: N/A     Occupational History   • Not on file.     Social History Main Topics   • Smoking status: Never Smoker   • Smokeless tobacco: Never Used   • Alcohol use No   • Drug  use: No   • Sexual activity: Defer     Other Topics Concern   • Not on file     Social History Narrative    He is  and lives alone with his wife although they have 3 children together who all live close by. He is retired from the [employer] and was exposed to Agent Orange during [place]. He is a lifelong nonsmoker.        Past Surgical History:   Procedure Laterality Date   • CARDIAC CATHETERIZATION  11/01/1999   • CARDIOVASCULAR STRESS TEST  09/2012   • CHOLECYSTECTOMY N/A 8/10/2018    Procedure: CHOLECYSTECTOMY LAPAROSCOPIC;  Surgeon: Ronak Downs MD;  Location: [facility] OR;  Service: General   • CYSTOSCOPY BLADDER STONE LITHOTRIPSY     • ECHO - CONVERTED  09/2012   • ERCP N/A 8/14/2018    Procedure: ENDOSCOPIC RETROGRADE CHOLANGIOPANCREATOGRAPHY WITH PAPILLOTOMY;  Surgeon: Scot uS MD;  Location: [facility] OR;  Service: Gastroenterology   • ERCP N/A 10/1/2018    Procedure: ENDOSCOPIC RETROGRADE CHOLANGIOPANCREATOGRAPHY;  Surgeon: Jefry Luna MD;  Location:  [facility];  Service: Gastroenterology   • KIDNEY STONE SURGERY     • KIDNEY STONE SURGERY      open abdominal surgery   • UPPER GASTROINTESTINAL ENDOSCOPY  08/30/2012       Review of Systems   Constitutional: Negative for activity change, appetite change, chills and fever.   HENT: Negative for sore throat and trouble swallowing.    Eyes: Negative for visual disturbance.   Respiratory: Negative for cough and shortness of breath.    Cardiovascular: Negative for chest pain and palpitations.   Gastrointestinal: Negative for abdominal distention, abdominal pain, blood in stool, constipation, diarrhea, nausea and vomiting.   Endocrine: Negative for cold intolerance and heat intolerance.   Genitourinary: Negative for dysuria.   Musculoskeletal: Negative for joint swelling.   Skin: Negative for color change, rash and wound.   Allergic/Immunologic: Negative for immunocompromised state.   Neurological: Negative for dizziness,  "seizures, weakness and headaches.   Hematological: Negative for adenopathy. Does not bruise/bleed easily.   Psychiatric/Behavioral: Negative for agitation and confusion.         Ht 182.9 cm (72\")   Wt 102 kg (225 lb)   BMI 30.52 kg/m²   Objective   Physical Exam   Constitutional: He is oriented to person, place, and time. He appears well-developed.   HENT:   Head: Normocephalic and atraumatic.   Mouth/Throat: Mucous membranes are normal.   Eyes: Pupils are equal, round, and reactive to light. Conjunctivae are normal.   Neck: Neck supple. No JVD present. No tracheal deviation present. No thyromegaly present.   Cardiovascular: Normal rate and regular rhythm.  Exam reveals no gallop and no friction rub.    No murmur heard.  Pulmonary/Chest: Effort normal and breath sounds normal.   Abdominal: Soft. He exhibits no distension. There is no splenomegaly or hepatomegaly. There is no tenderness. No hernia.   Musculoskeletal: Normal range of motion. He exhibits no deformity.   Neurological: He is alert and oriented to person, place, and time.   Skin: Skin is warm and dry.   Psychiatric: He has a normal mood and affect.         Todd was seen today for port placement consult.    Diagnoses and all orders for this visit:    Malignant neoplasm of head of pancreas (CMS/HCC)  -     Case Request; Standing  -     CeFAZolin Sodium-Dextrose (ANCEF) IVPB (duplex) 2 g; Infuse 2,000 mg into a venous catheter 1 (One) Time.  -     Case Request    Other orders  -     Follow anesthesia standing orders.  -     Provide instructions to patient on NPO status  -     Clorhexidine skin prep  -     Follow Anesthesia Guidelines / Standing Orders; Standing  -     Verify NPO Status; Standing  -     Obtain informed consent; Standing  -     SCD (sequential compression device)- to be placed on patient in Pre-op; Standing        Assessment     Todd Phillips is a 68 y.o. male with pancreatic cancer and need for chemotherapy access.  He will undergo " chemotherapy next Tuesday and I will place a port that morning and leave it accessed for initiation of treatments.    Patient's Body mass index is 30.52 kg/m². BMI is above normal parameters. Recommendations include: educational material.

## 2018-10-23 NOTE — OP NOTE
INSERTION OF PORTACATH  Procedure Note    Todd Phillips  10/23/2018    Pre-op Diagnosis:   Malignant neoplasm of head of pancreas (CMS/HCC) [C25.0]    Post-op Diagnosis:     Post-Op Diagnosis Codes:     * Malignant neoplasm of head of pancreas (CMS/HCC) [C25.0]    Procedure(s):  INSERTION OF PORTACATH    Surgeon(s):  Ronak Downs MD    Indication:  Pancreatic cancer    Anesthesia: General    Staff:   Circulator: Nneka Beltran RN  Radiology Technologist: Jaimie Arguelles  Assistant: London Zepeda CSA  Other: Ernesto Lucero    Operative Description: The patient was taken operating suite and placed supine on operating table.  Bilateral sequential compression devices were applied and anesthesia was administered.  The right neck and chest are prepped and draped in sterile fashion.  Timeout procedure was performed after antibiotics and been confirmed.   The right neck and chest were then infiltrated with local anesthetic.  Under ultrasound visualization an 18-gauge access needle was inserted into the right internal jugular vein under direct visualization.  Venous blood was aspirated.  Through the access needle guidewire was placed without resistance.  Ultrasound and fluoroscopy confirmed guidewire appropriate position and course.  A stab incision at the guidewire entry site was made.  2 fingerbreadths below the right clavicle a transverse incision was then made and with blunt and cautery dissection a subcutaneous pocket was created.  The catheter was then tunneled subcutaneously over the clavicle through the guidewire entry site in the right neck.  The catheter was cut to appropriate length based on patient height and the port was assembled and placed in the subcutaneous pocket.  Under fluoroscopic visualization the dilator introducer sheath was inserted via Seldinger technique over the guidewire into the right internal jugular vein.  The guidewire and sheath were removed.  The catheter was  inserted into the removable sheath and the sheath was removed.  Fluoroscopy showed the catheter tip in appropriate position with no evidence of kinking.  The port was accessed and withdrew venous blood easily and flushed with heparinized saline solution easily.  The port was secured to the pectoralis fascia with Prolene sutures.  The port site was closed with subcutaneous Vicryl and Monocryl subcuticular suture.  The right neck incision was closed with a subcuticular Monocryl suture.  The incisions are dressed with sure close and the port was accessed with a 19G Souza needle.  The patient was awakened from anesthesia after all counts were correct and taken to recovery where stat chest x-ray was ordered confirming placement.    Estimated Blood Loss: 10mL    Specimens:   none                 Drains: none    Grafts/Implants: R IJ port    Findings: R IJ port withdrew and flushed, left accessed with 19G Souza needle    Complications: none      oRnak Downs MD     Date: 10/23/2018  Time: 9:24 AM

## 2018-10-28 ENCOUNTER — HOSPITAL ENCOUNTER (EMERGENCY)
Facility: HOSPITAL | Age: 68
Discharge: HOME | End: 2018-10-28
Admitting: INTERNAL MEDICINE

## 2018-10-28 ENCOUNTER — APPOINTMENT (OUTPATIENT)
Dept: GENERAL RADIOLOGY | Facility: HOSPITAL | Age: 68
End: 2018-10-28

## 2018-10-28 VITALS
WEIGHT: 212 LBS | TEMPERATURE: 98 F | SYSTOLIC BLOOD PRESSURE: 118 MMHG | DIASTOLIC BLOOD PRESSURE: 72 MMHG | RESPIRATION RATE: 18 BRPM | HEART RATE: 81 BPM | HEIGHT: 72 IN | OXYGEN SATURATION: 99 % | BODY MASS INDEX: 28.71 KG/M2

## 2018-10-28 DIAGNOSIS — E86.0 DEHYDRATION: Primary | ICD-10-CM

## 2018-10-28 DIAGNOSIS — R11.0 NAUSEA: ICD-10-CM

## 2018-10-28 LAB
ALBUMIN SERPL-MCNC: 4.1 G/DL (ref 3.4–4.8)
ALBUMIN/GLOB SERPL: 1.4 G/DL (ref 1.5–2.5)
ALP SERPL-CCNC: 96 U/L (ref 40–129)
ALT SERPL W P-5'-P-CCNC: 22 U/L (ref 10–44)
ANION GAP SERPL CALCULATED.3IONS-SCNC: 6.6 MMOL/L (ref 3.6–11.2)
AST SERPL-CCNC: 31 U/L (ref 10–34)
BASOPHILS # BLD AUTO: 0.02 10*3/MM3 (ref 0–0.3)
BASOPHILS NFR BLD AUTO: 0.5 % (ref 0–2)
BILIRUB SERPL-MCNC: 0.8 MG/DL (ref 0.2–1.8)
BILIRUB UR QL STRIP: NEGATIVE
BUN BLD-MCNC: 31 MG/DL (ref 7–21)
BUN/CREAT SERPL: 19.4 (ref 7–25)
CALCIUM SPEC-SCNC: 9.2 MG/DL (ref 7.7–10)
CHLORIDE SERPL-SCNC: 99 MMOL/L (ref 99–112)
CLARITY UR: CLEAR
CO2 SERPL-SCNC: 26.4 MMOL/L (ref 24.3–31.9)
COLOR UR: ABNORMAL
CREAT BLD-MCNC: 1.6 MG/DL (ref 0.43–1.29)
D-LACTATE SERPL-SCNC: 1.2 MMOL/L (ref 0.5–2)
DEPRECATED RDW RBC AUTO: 40.3 FL (ref 37–54)
EOSINOPHIL # BLD AUTO: 0.07 10*3/MM3 (ref 0–0.7)
EOSINOPHIL NFR BLD AUTO: 1.7 % (ref 0–7)
ERYTHROCYTE [DISTWIDTH] IN BLOOD BY AUTOMATED COUNT: 12.9 % (ref 11.5–14.5)
GFR SERPL CREATININE-BSD FRML MDRD: 43 ML/MIN/1.73
GLOBULIN UR ELPH-MCNC: 3 GM/DL
GLUCOSE BLD-MCNC: 139 MG/DL (ref 70–110)
GLUCOSE UR STRIP-MCNC: NEGATIVE MG/DL
HCT VFR BLD AUTO: 33.6 % (ref 42–52)
HGB BLD-MCNC: 11 G/DL (ref 14–18)
HGB UR QL STRIP.AUTO: NEGATIVE
IMM GRANULOCYTES # BLD: 0.01 10*3/MM3 (ref 0–0.03)
IMM GRANULOCYTES NFR BLD: 0.2 % (ref 0–0.5)
KETONES UR QL STRIP: NEGATIVE
LEUKOCYTE ESTERASE UR QL STRIP.AUTO: NEGATIVE
LYMPHOCYTES # BLD AUTO: 1.03 10*3/MM3 (ref 1–3)
LYMPHOCYTES NFR BLD AUTO: 24.9 % (ref 16–46)
MCH RBC QN AUTO: 28.9 PG (ref 27–33)
MCHC RBC AUTO-ENTMCNC: 32.7 G/DL (ref 33–37)
MCV RBC AUTO: 88.2 FL (ref 80–94)
MONOCYTES # BLD AUTO: 0.08 10*3/MM3 (ref 0.1–0.9)
MONOCYTES NFR BLD AUTO: 1.9 % (ref 0–12)
NEUTROPHILS # BLD AUTO: 2.92 10*3/MM3 (ref 1.4–6.5)
NEUTROPHILS NFR BLD AUTO: 70.8 % (ref 40–75)
NITRITE UR QL STRIP: NEGATIVE
OSMOLALITY SERPL CALC.SUM OF ELEC: 273.3 MOSM/KG (ref 273–305)
PH UR STRIP.AUTO: 5.5 [PH] (ref 5–8)
PLATELET # BLD AUTO: 164 10*3/MM3 (ref 130–400)
PMV BLD AUTO: 10.4 FL (ref 6–10)
POTASSIUM BLD-SCNC: 4.8 MMOL/L (ref 3.5–5.3)
PROT SERPL-MCNC: 7.1 G/DL (ref 6–8)
PROT UR QL STRIP: NEGATIVE
RBC # BLD AUTO: 3.81 10*6/MM3 (ref 4.7–6.1)
SODIUM BLD-SCNC: 132 MMOL/L (ref 135–153)
SP GR UR STRIP: 1.02 (ref 1–1.03)
UROBILINOGEN UR QL STRIP: ABNORMAL
WBC NRBC COR # BLD: 4.13 10*3/MM3 (ref 4.5–12.5)

## 2018-10-28 PROCEDURE — 80053 COMPREHEN METABOLIC PANEL: CPT | Performed by: NURSE PRACTITIONER

## 2018-10-28 PROCEDURE — 71045 X-RAY EXAM CHEST 1 VIEW: CPT

## 2018-10-28 PROCEDURE — 25010000002 ONDANSETRON PER 1 MG: Performed by: NURSE PRACTITIONER

## 2018-10-28 PROCEDURE — 99284 EMERGENCY DEPT VISIT MOD MDM: CPT

## 2018-10-28 PROCEDURE — 85025 COMPLETE CBC W/AUTO DIFF WBC: CPT | Performed by: NURSE PRACTITIONER

## 2018-10-28 PROCEDURE — 81003 URINALYSIS AUTO W/O SCOPE: CPT | Performed by: NURSE PRACTITIONER

## 2018-10-28 PROCEDURE — 71045 X-RAY EXAM CHEST 1 VIEW: CPT | Performed by: RADIOLOGY

## 2018-10-28 PROCEDURE — 96361 HYDRATE IV INFUSION ADD-ON: CPT

## 2018-10-28 PROCEDURE — 83605 ASSAY OF LACTIC ACID: CPT | Performed by: NURSE PRACTITIONER

## 2018-10-28 PROCEDURE — 96374 THER/PROPH/DIAG INJ IV PUSH: CPT

## 2018-10-28 PROCEDURE — 87040 BLOOD CULTURE FOR BACTERIA: CPT | Performed by: NURSE PRACTITIONER

## 2018-10-28 PROCEDURE — 36415 COLL VENOUS BLD VENIPUNCTURE: CPT

## 2018-10-28 RX ORDER — SODIUM CHLORIDE 0.9 % (FLUSH) 0.9 %
10 SYRINGE (ML) INJECTION AS NEEDED
Status: DISCONTINUED | OUTPATIENT
Start: 2018-10-28 | End: 2018-10-28 | Stop reason: HOSPADM

## 2018-10-28 RX ORDER — ONDANSETRON 2 MG/ML
4 INJECTION INTRAMUSCULAR; INTRAVENOUS ONCE
Status: COMPLETED | OUTPATIENT
Start: 2018-10-28 | End: 2018-10-28

## 2018-10-28 RX ADMIN — ONDANSETRON 4 MG: 2 INJECTION, SOLUTION INTRAMUSCULAR; INTRAVENOUS at 17:50

## 2018-10-28 RX ADMIN — SODIUM CHLORIDE 500 ML: 9 INJECTION, SOLUTION INTRAVENOUS at 17:54

## 2018-10-28 RX ADMIN — SODIUM CHLORIDE 500 ML: 9 INJECTION, SOLUTION INTRAVENOUS at 15:34

## 2018-10-28 RX ADMIN — HEPARIN SODIUM (PORCINE) LOCK FLUSH IV SOLN 100 UNIT/ML 500 UNITS: 100 SOLUTION at 19:50

## 2018-10-29 ENCOUNTER — INFUSION (OUTPATIENT)
Dept: ONCOLOGY | Facility: HOSPITAL | Age: 68
End: 2018-10-29
Attending: INTERNAL MEDICINE

## 2018-10-29 ENCOUNTER — TELEPHONE (OUTPATIENT)
Dept: ONCOLOGY | Facility: HOSPITAL | Age: 68
End: 2018-10-29

## 2018-10-29 VITALS
BODY MASS INDEX: 27.67 KG/M2 | WEIGHT: 204 LBS | HEART RATE: 103 BPM | SYSTOLIC BLOOD PRESSURE: 93 MMHG | RESPIRATION RATE: 18 BRPM | DIASTOLIC BLOOD PRESSURE: 67 MMHG | OXYGEN SATURATION: 96 % | TEMPERATURE: 98.2 F

## 2018-10-29 DIAGNOSIS — E86.0 DEHYDRATION: Primary | ICD-10-CM

## 2018-10-29 DIAGNOSIS — C25.0 MALIGNANT NEOPLASM OF HEAD OF PANCREAS (HCC): ICD-10-CM

## 2018-10-29 PROCEDURE — 96361 HYDRATE IV INFUSION ADD-ON: CPT

## 2018-10-29 PROCEDURE — 25010000002 ONDANSETRON PER 1 MG: Performed by: INTERNAL MEDICINE

## 2018-10-29 PROCEDURE — 96365 THER/PROPH/DIAG IV INF INIT: CPT

## 2018-10-29 PROCEDURE — 96374 THER/PROPH/DIAG INJ IV PUSH: CPT

## 2018-10-29 RX ORDER — SODIUM CHLORIDE 0.9 % (FLUSH) 0.9 %
10 SYRINGE (ML) INJECTION AS NEEDED
Status: DISCONTINUED | OUTPATIENT
Start: 2018-10-29 | End: 2018-10-29 | Stop reason: HOSPADM

## 2018-10-29 RX ORDER — SODIUM CHLORIDE 0.9 % (FLUSH) 0.9 %
10 SYRINGE (ML) INJECTION AS NEEDED
Status: CANCELLED | OUTPATIENT
Start: 2018-10-30

## 2018-10-29 RX ORDER — SODIUM CHLORIDE 9 MG/ML
999 INJECTION, SOLUTION INTRAVENOUS ONCE
Status: COMPLETED | OUTPATIENT
Start: 2018-10-29 | End: 2018-10-29

## 2018-10-29 RX ADMIN — HEPARIN SODIUM (PORCINE) LOCK FLUSH IV SOLN 100 UNIT/ML 500 UNITS: 100 SOLUTION at 14:50

## 2018-10-29 RX ADMIN — SODIUM CHLORIDE 999 ML/HR: 9 INJECTION, SOLUTION INTRAVENOUS at 13:45

## 2018-10-29 RX ADMIN — SODIUM CHLORIDE 8 MG: 9 INJECTION, SOLUTION INTRAVENOUS at 14:10

## 2018-10-29 RX ADMIN — Medication 10 ML: at 14:50

## 2018-10-30 ENCOUNTER — APPOINTMENT (OUTPATIENT)
Dept: GENERAL RADIOLOGY | Facility: HOSPITAL | Age: 68
End: 2018-10-30

## 2018-10-30 ENCOUNTER — OFFICE VISIT (OUTPATIENT)
Dept: ONCOLOGY | Facility: CLINIC | Age: 68
End: 2018-10-30

## 2018-10-30 ENCOUNTER — INFUSION (OUTPATIENT)
Dept: ONCOLOGY | Facility: HOSPITAL | Age: 68
End: 2018-10-30
Attending: INTERNAL MEDICINE

## 2018-10-30 ENCOUNTER — HOSPITAL ENCOUNTER (EMERGENCY)
Facility: HOSPITAL | Age: 68
Discharge: HOME | End: 2018-10-30
Attending: EMERGENCY MEDICINE | Admitting: EMERGENCY MEDICINE

## 2018-10-30 ENCOUNTER — LAB (OUTPATIENT)
Dept: ONCOLOGY | Facility: CLINIC | Age: 68
End: 2018-10-30

## 2018-10-30 VITALS
BODY MASS INDEX: 28.01 KG/M2 | SYSTOLIC BLOOD PRESSURE: 144 MMHG | HEART RATE: 96 BPM | RESPIRATION RATE: 20 BRPM | OXYGEN SATURATION: 97 % | WEIGHT: 206.5 LBS | DIASTOLIC BLOOD PRESSURE: 80 MMHG | TEMPERATURE: 96.5 F

## 2018-10-30 VITALS
HEART RATE: 78 BPM | RESPIRATION RATE: 16 BRPM | OXYGEN SATURATION: 100 % | BODY MASS INDEX: 27.77 KG/M2 | HEIGHT: 72 IN | DIASTOLIC BLOOD PRESSURE: 83 MMHG | SYSTOLIC BLOOD PRESSURE: 124 MMHG | TEMPERATURE: 98 F | WEIGHT: 205 LBS

## 2018-10-30 VITALS
HEART RATE: 96 BPM | BODY MASS INDEX: 28.01 KG/M2 | RESPIRATION RATE: 20 BRPM | WEIGHT: 206.5 LBS | OXYGEN SATURATION: 97 % | DIASTOLIC BLOOD PRESSURE: 80 MMHG | TEMPERATURE: 96.5 F | SYSTOLIC BLOOD PRESSURE: 144 MMHG

## 2018-10-30 DIAGNOSIS — G89.3 NEOPLASM RELATED PAIN: ICD-10-CM

## 2018-10-30 DIAGNOSIS — R11.0 NAUSEA: ICD-10-CM

## 2018-10-30 DIAGNOSIS — R10.9 ABDOMINAL PAIN, UNSPECIFIED ABDOMINAL LOCATION: ICD-10-CM

## 2018-10-30 DIAGNOSIS — E86.0 DEHYDRATION: ICD-10-CM

## 2018-10-30 DIAGNOSIS — R11.2 CHEMOTHERAPY INDUCED NAUSEA AND VOMITING: Primary | ICD-10-CM

## 2018-10-30 DIAGNOSIS — N17.9 AKI (ACUTE KIDNEY INJURY) (HCC): ICD-10-CM

## 2018-10-30 DIAGNOSIS — K56.41 FECAL IMPACTION (HCC): ICD-10-CM

## 2018-10-30 DIAGNOSIS — C25.0 MALIGNANT NEOPLASM OF HEAD OF PANCREAS (HCC): Primary | ICD-10-CM

## 2018-10-30 DIAGNOSIS — C25.9 MALIGNANT NEOPLASM OF PANCREAS, UNSPECIFIED LOCATION OF MALIGNANCY (HCC): Primary | ICD-10-CM

## 2018-10-30 DIAGNOSIS — E86.0 DEHYDRATION: Primary | ICD-10-CM

## 2018-10-30 DIAGNOSIS — I48.91 ATRIAL FIBRILLATION, UNSPECIFIED TYPE (HCC): ICD-10-CM

## 2018-10-30 DIAGNOSIS — K59.00 CONSTIPATION, UNSPECIFIED CONSTIPATION TYPE: ICD-10-CM

## 2018-10-30 DIAGNOSIS — T45.1X5A CHEMOTHERAPY INDUCED NAUSEA AND VOMITING: Primary | ICD-10-CM

## 2018-10-30 DIAGNOSIS — R63.0 POOR APPETITE: ICD-10-CM

## 2018-10-30 LAB
ALBUMIN SERPL-MCNC: 3.7 G/DL (ref 3.4–4.8)
ALBUMIN SERPL-MCNC: 3.9 G/DL (ref 3.4–4.8)
ALBUMIN/GLOB SERPL: 1.3 G/DL (ref 1.5–2.5)
ALBUMIN/GLOB SERPL: 1.4 G/DL (ref 1.5–2.5)
ALP SERPL-CCNC: 94 U/L (ref 40–129)
ALP SERPL-CCNC: 97 U/L (ref 40–129)
ALT SERPL W P-5'-P-CCNC: 23 U/L (ref 10–44)
ALT SERPL W P-5'-P-CCNC: 26 U/L (ref 10–44)
AMYLASE SERPL-CCNC: 46 U/L (ref 28–100)
ANION GAP SERPL CALCULATED.3IONS-SCNC: 4.3 MMOL/L (ref 3.6–11.2)
ANION GAP SERPL CALCULATED.3IONS-SCNC: 7 MMOL/L (ref 3.6–11.2)
AST SERPL-CCNC: 29 U/L (ref 10–34)
AST SERPL-CCNC: 32 U/L (ref 10–34)
BASOPHILS # BLD AUTO: 0.01 10*3/MM3 (ref 0–0.3)
BASOPHILS # BLD AUTO: 0.02 10*3/MM3 (ref 0–0.3)
BASOPHILS NFR BLD AUTO: 0.3 % (ref 0–2)
BASOPHILS NFR BLD AUTO: 0.6 % (ref 0–2)
BILIRUB SERPL-MCNC: 0.4 MG/DL (ref 0.2–1.8)
BILIRUB SERPL-MCNC: 0.5 MG/DL (ref 0.2–1.8)
BILIRUB UR QL STRIP: NEGATIVE
BUN BLD-MCNC: 26 MG/DL (ref 7–21)
BUN BLD-MCNC: 28 MG/DL (ref 7–21)
BUN/CREAT SERPL: 17.2 (ref 7–25)
BUN/CREAT SERPL: 17.2 (ref 7–25)
CALCIUM SPEC-SCNC: 8.7 MG/DL (ref 7.7–10)
CALCIUM SPEC-SCNC: 9.2 MG/DL (ref 7.7–10)
CHLORIDE SERPL-SCNC: 106 MMOL/L (ref 99–112)
CHLORIDE SERPL-SCNC: 110 MMOL/L (ref 99–112)
CLARITY UR: CLEAR
CO2 SERPL-SCNC: 23.7 MMOL/L (ref 24.3–31.9)
CO2 SERPL-SCNC: 24 MMOL/L (ref 24.3–31.9)
COLOR UR: YELLOW
CREAT BLD-MCNC: 1.51 MG/DL (ref 0.43–1.29)
CREAT BLD-MCNC: 1.63 MG/DL (ref 0.43–1.29)
D-LACTATE SERPL-SCNC: 1.4 MMOL/L (ref 0.5–2)
DEPRECATED RDW RBC AUTO: 40.4 FL (ref 37–54)
DEPRECATED RDW RBC AUTO: 40.6 FL (ref 37–54)
EOSINOPHIL # BLD AUTO: 0.01 10*3/MM3 (ref 0–0.7)
EOSINOPHIL # BLD AUTO: 0.01 10*3/MM3 (ref 0–0.7)
EOSINOPHIL NFR BLD AUTO: 0.3 % (ref 0–7)
EOSINOPHIL NFR BLD AUTO: 0.3 % (ref 0–7)
ERYTHROCYTE [DISTWIDTH] IN BLOOD BY AUTOMATED COUNT: 12.9 % (ref 11.5–14.5)
ERYTHROCYTE [DISTWIDTH] IN BLOOD BY AUTOMATED COUNT: 13 % (ref 11.5–14.5)
GFR SERPL CREATININE-BSD FRML MDRD: 42 ML/MIN/1.73
GFR SERPL CREATININE-BSD FRML MDRD: 46 ML/MIN/1.73
GLOBULIN UR ELPH-MCNC: 2.7 GM/DL
GLOBULIN UR ELPH-MCNC: 2.9 GM/DL
GLUCOSE BLD-MCNC: 113 MG/DL (ref 70–110)
GLUCOSE BLD-MCNC: 115 MG/DL (ref 70–110)
GLUCOSE UR STRIP-MCNC: NEGATIVE MG/DL
HCT VFR BLD AUTO: 31 % (ref 42–52)
HCT VFR BLD AUTO: 32.2 % (ref 42–52)
HGB BLD-MCNC: 10.1 G/DL (ref 14–18)
HGB BLD-MCNC: 10.5 G/DL (ref 14–18)
HGB UR QL STRIP.AUTO: NEGATIVE
HOLD SPECIMEN: NORMAL
HOLD SPECIMEN: NORMAL
IMM GRANULOCYTES # BLD: 0.03 10*3/MM3 (ref 0–0.03)
IMM GRANULOCYTES # BLD: 0.03 10*3/MM3 (ref 0–0.03)
IMM GRANULOCYTES NFR BLD: 0.9 % (ref 0–0.5)
IMM GRANULOCYTES NFR BLD: 1 % (ref 0–0.5)
KETONES UR QL STRIP: NEGATIVE
LEUKOCYTE ESTERASE UR QL STRIP.AUTO: NEGATIVE
LIPASE SERPL-CCNC: 22 U/L (ref 13–60)
LYMPHOCYTES # BLD AUTO: 0.94 10*3/MM3 (ref 1–3)
LYMPHOCYTES # BLD AUTO: 1.49 10*3/MM3 (ref 1–3)
LYMPHOCYTES NFR BLD AUTO: 30.5 % (ref 16–46)
LYMPHOCYTES NFR BLD AUTO: 43.3 % (ref 16–46)
MAGNESIUM SERPL-MCNC: 1.7 MG/DL (ref 1.7–2.6)
MCH RBC QN AUTO: 28.9 PG (ref 27–33)
MCH RBC QN AUTO: 28.9 PG (ref 27–33)
MCHC RBC AUTO-ENTMCNC: 32.6 G/DL (ref 33–37)
MCHC RBC AUTO-ENTMCNC: 32.6 G/DL (ref 33–37)
MCV RBC AUTO: 88.7 FL (ref 80–94)
MCV RBC AUTO: 88.8 FL (ref 80–94)
MONOCYTES # BLD AUTO: 0.27 10*3/MM3 (ref 0.1–0.9)
MONOCYTES # BLD AUTO: 0.3 10*3/MM3 (ref 0.1–0.9)
MONOCYTES NFR BLD AUTO: 7.8 % (ref 0–12)
MONOCYTES NFR BLD AUTO: 9.7 % (ref 0–12)
NEUTROPHILS # BLD AUTO: 1.63 10*3/MM3 (ref 1.4–6.5)
NEUTROPHILS # BLD AUTO: 1.78 10*3/MM3 (ref 1.4–6.5)
NEUTROPHILS NFR BLD AUTO: 47.4 % (ref 40–75)
NEUTROPHILS NFR BLD AUTO: 57.9 % (ref 40–75)
NITRITE UR QL STRIP: NEGATIVE
OSMOLALITY SERPL CALC.SUM OF ELEC: 280.2 MOSM/KG (ref 273–305)
OSMOLALITY SERPL CALC.SUM OF ELEC: 281.2 MOSM/KG (ref 273–305)
PH UR STRIP.AUTO: 6 [PH] (ref 5–8)
PLATELET # BLD AUTO: 110 10*3/MM3 (ref 130–400)
PLATELET # BLD AUTO: 117 10*3/MM3 (ref 130–400)
PMV BLD AUTO: 10 FL (ref 6–10)
PMV BLD AUTO: 9.8 FL (ref 6–10)
POTASSIUM BLD-SCNC: 3.9 MMOL/L (ref 3.5–5.3)
POTASSIUM BLD-SCNC: 4.4 MMOL/L (ref 3.5–5.3)
PROT SERPL-MCNC: 6.4 G/DL (ref 6–8)
PROT SERPL-MCNC: 6.8 G/DL (ref 6–8)
PROT UR QL STRIP: NEGATIVE
RBC # BLD AUTO: 3.49 10*6/MM3 (ref 4.7–6.1)
RBC # BLD AUTO: 3.63 10*6/MM3 (ref 4.7–6.1)
SODIUM BLD-SCNC: 137 MMOL/L (ref 135–153)
SODIUM BLD-SCNC: 138 MMOL/L (ref 135–153)
SP GR UR STRIP: 1.02 (ref 1–1.03)
T4 FREE SERPL-MCNC: 1.14 NG/DL (ref 0.89–1.76)
TROPONIN I SERPL-MCNC: <0.006 NG/ML
TSH SERPL DL<=0.05 MIU/L-ACNC: 0.55 MIU/ML (ref 0.55–4.78)
UROBILINOGEN UR QL STRIP: NORMAL
WBC NRBC COR # BLD: 3.08 10*3/MM3 (ref 4.5–12.5)
WBC NRBC COR # BLD: 3.44 10*3/MM3 (ref 4.5–12.5)
WHOLE BLOOD HOLD SPECIMEN: NORMAL
WHOLE BLOOD HOLD SPECIMEN: NORMAL

## 2018-10-30 PROCEDURE — 87040 BLOOD CULTURE FOR BACTERIA: CPT | Performed by: EMERGENCY MEDICINE

## 2018-10-30 PROCEDURE — 82150 ASSAY OF AMYLASE: CPT | Performed by: EMERGENCY MEDICINE

## 2018-10-30 PROCEDURE — 71045 X-RAY EXAM CHEST 1 VIEW: CPT

## 2018-10-30 PROCEDURE — 83735 ASSAY OF MAGNESIUM: CPT | Performed by: EMERGENCY MEDICINE

## 2018-10-30 PROCEDURE — 84484 ASSAY OF TROPONIN QUANT: CPT | Performed by: EMERGENCY MEDICINE

## 2018-10-30 PROCEDURE — 96361 HYDRATE IV INFUSION ADD-ON: CPT

## 2018-10-30 PROCEDURE — 96374 THER/PROPH/DIAG INJ IV PUSH: CPT

## 2018-10-30 PROCEDURE — 85025 COMPLETE CBC W/AUTO DIFF WBC: CPT | Performed by: EMERGENCY MEDICINE

## 2018-10-30 PROCEDURE — 93010 ELECTROCARDIOGRAM REPORT: CPT | Performed by: INTERNAL MEDICINE

## 2018-10-30 PROCEDURE — 71045 X-RAY EXAM CHEST 1 VIEW: CPT | Performed by: RADIOLOGY

## 2018-10-30 PROCEDURE — 80053 COMPREHEN METABOLIC PANEL: CPT | Performed by: INTERNAL MEDICINE

## 2018-10-30 PROCEDURE — 25010000002 ONDANSETRON PER 1 MG: Performed by: EMERGENCY MEDICINE

## 2018-10-30 PROCEDURE — 83605 ASSAY OF LACTIC ACID: CPT | Performed by: EMERGENCY MEDICINE

## 2018-10-30 PROCEDURE — 84443 ASSAY THYROID STIM HORMONE: CPT | Performed by: EMERGENCY MEDICINE

## 2018-10-30 PROCEDURE — 99214 OFFICE O/P EST MOD 30 MIN: CPT | Performed by: NURSE PRACTITIONER

## 2018-10-30 PROCEDURE — 85025 COMPLETE CBC W/AUTO DIFF WBC: CPT | Performed by: INTERNAL MEDICINE

## 2018-10-30 PROCEDURE — 93005 ELECTROCARDIOGRAM TRACING: CPT | Performed by: EMERGENCY MEDICINE

## 2018-10-30 PROCEDURE — 81003 URINALYSIS AUTO W/O SCOPE: CPT | Performed by: EMERGENCY MEDICINE

## 2018-10-30 PROCEDURE — 96375 TX/PRO/DX INJ NEW DRUG ADDON: CPT

## 2018-10-30 PROCEDURE — 25010000002 ONDANSETRON PER 1 MG: Performed by: NURSE PRACTITIONER

## 2018-10-30 PROCEDURE — 80053 COMPREHEN METABOLIC PANEL: CPT | Performed by: EMERGENCY MEDICINE

## 2018-10-30 PROCEDURE — 83690 ASSAY OF LIPASE: CPT | Performed by: EMERGENCY MEDICINE

## 2018-10-30 PROCEDURE — 84439 ASSAY OF FREE THYROXINE: CPT | Performed by: EMERGENCY MEDICINE

## 2018-10-30 PROCEDURE — 99285 EMERGENCY DEPT VISIT HI MDM: CPT

## 2018-10-30 RX ORDER — SODIUM CHLORIDE 9 MG/ML
125 INJECTION, SOLUTION INTRAVENOUS CONTINUOUS
Status: DISCONTINUED | OUTPATIENT
Start: 2018-10-30 | End: 2018-10-30 | Stop reason: HOSPADM

## 2018-10-30 RX ORDER — PANTOPRAZOLE SODIUM 40 MG/10ML
40 INJECTION, POWDER, LYOPHILIZED, FOR SOLUTION INTRAVENOUS ONCE
Status: COMPLETED | OUTPATIENT
Start: 2018-10-30 | End: 2018-10-30

## 2018-10-30 RX ORDER — SODIUM CHLORIDE 9 MG/ML
INJECTION, SOLUTION INTRAVENOUS
Status: DISPENSED
Start: 2018-10-30

## 2018-10-30 RX ORDER — SODIUM CHLORIDE 9 MG/ML
999 INJECTION, SOLUTION INTRAVENOUS ONCE
Status: COMPLETED | OUTPATIENT
Start: 2018-10-30 | End: 2018-10-30

## 2018-10-30 RX ORDER — ONDANSETRON 2 MG/ML
4 INJECTION INTRAMUSCULAR; INTRAVENOUS ONCE
Status: COMPLETED | OUTPATIENT
Start: 2018-10-30 | End: 2018-10-30

## 2018-10-30 RX ADMIN — PANTOPRAZOLE SODIUM 40 MG: 40 INJECTION, POWDER, FOR SOLUTION INTRAVENOUS at 14:16

## 2018-10-30 RX ADMIN — SODIUM CHLORIDE 8 MG: 9 INJECTION, SOLUTION INTRAVENOUS at 11:39

## 2018-10-30 RX ADMIN — SODIUM CHLORIDE 999 ML/HR: 9 INJECTION, SOLUTION INTRAVENOUS at 11:34

## 2018-10-30 RX ADMIN — ONDANSETRON 4 MG: 2 INJECTION, SOLUTION INTRAMUSCULAR; INTRAVENOUS at 14:14

## 2018-10-30 RX ADMIN — SODIUM CHLORIDE, PRESERVATIVE FREE 300 UNITS: 5 INJECTION INTRAVENOUS at 18:57

## 2018-10-30 RX ADMIN — SODIUM CHLORIDE 2790 ML: 9 INJECTION, SOLUTION INTRAVENOUS at 14:14

## 2018-10-30 NOTE — PROGRESS NOTES
DATE : 10/30/18    DIAGNOSIS:   1.  Pancreatic cancer    2.  Repeated episodes of bacteremia - Klebsiella pneumoniae in August 2018, ESBL E coli in September 2018    CHIEF COMPLAINT:  Abdominal pain, constipation  Fatigue, weakness  Poor appetite, ongoing nausea    TREATMENT:      HISTORY OF PRESENT ILLNESS:   Todd Phillips is a very pleasant 68 y.o. male who is being seen today at the request of Dr. Miquel Brody for evaluation and treatment of pancreatic cancer.  Mr. Phillips initially developed symptoms in January of 2018. At that time he had pain in his upper abdomen which would radiate to his back.  In July, the pain intensified.  He was seen in the ER and also by Dr. Su.  He underwent RUQ U/S and then HIDA scan and it was determined he had a dysfunctional gallbladder.  He saw Dr. Downs and had cholecystectomy on 8-10-18.  Pathology showed chronic cholecystitis and an incidental benign lymph node.  He re-presented in the post-operative period with jaundice.  Dr. Su performed ERCP on8-14-18.  He was noted to have a 3 cm irregular tight stricture of the distal common bile duct with proximal biliary ductal dilatation.  Biliary papillotomy and stricture dilatation performed and brushings taken.  A 10 Fr x 5 cm biliary stent was placed.  Brushings were taken but were negative.  There was sl dilatation of the distal pancreatic duct without discrete stricture.   He was discharged with improving jaundice on 8-16.  On 8-21 he represented with sepsis due to Klebsiella pneumonia bacteremia and was admitted.  He then was referred to Dr. Brody for evaluation and treatment.  He has been set up for EUS with biopsy next Friday for what appears to be a primary pancreatic malignancy in the head of the pancreas.  Dr. Brody has referred him for consideration of neoadjuvant therapy.    Mr. Phillips complains today of pain which is not controlled.  He has been taking Morphine 15 mg q12h and norco 5  I tab q6h.  He isn't  sleeping because of the pain.   He previously struggled with abdominal pain but has had diarrhea after every meal since his cholecystectomy.    INTERVAL HISTORY:  Mr. Phillips presents today, accompanied by his wife for follow up of pancreatic cancer and toxicity check. He underwent powerport placement and was recently started on neoadjuvant chemotherapy with Gemcitabine and Abraxane, completed cycle 1, day 1 last week. Over the past week, he reports having worsening fatigue, weakness, poor appetite and ongoing nausea. He says his BP was low a couple of days ago and presented to ER for further evaluation. He reports receiving IVF and zofran IVPB for dehydration and nausea and discharged home. He came to clinic yesterday and was again given IVF and zofran IVPB. He does report the zofran was helpful and was able to eat some last evening. He is scheduled for cycle 1, day 8 today but he continues to be extremely weak and tired. His wife says he is eating and drinking very little. He is taking zofran and compazine scheduled along with Marinol BID with no relief of nausea. He has not been vomiting. He also complains of abdominal pain and rectal pain and reports having constipation. He is taking colace BID and Miralax daily. His wife also gave him glycerin supp this morning. He says his LBM was Friday. He reports pain is controlled with current medications. He denies any other complaints today.     PAST MEDICAL HISTORY:  Past Medical History:   Diagnosis Date   • Antral gastritis    • Asthma    • Atrial fibrillation (CMS/HCC)     treated with oral blood thinner   • Chest pain    • COPD (chronic obstructive pulmonary disease) (CMS/HCC)    • Coronary artery disease     no stents   • Diabetes mellitus (CMS/HCC)     type 2    • Duodenitis    • Elevated cholesterol    • Emphysema of lung (CMS/HCC)    • Gallbladder disease     removed    • GERD (gastroesophageal reflux disease)    • Hyperlipidemia    • Hypertension    • Jaundice     • Kidney stone    • Kidney stones     had lithotripsy and passed 36 kidney stones as well as had one surgically removed.   • Malignant neoplasm of head of pancreas (CMS/HCC) 9/5/2018   • Palpitations    • Pneumonia 08/2018   • Reflux esophagitis    • Renal failure     stage 3 kidney disease   • Sepsis (CMS/HCC)        PAST SURGICAL HISTORY:  Past Surgical History:   Procedure Laterality Date   • BILE DUCT STENT PLACEMENT      [facility] 2 weeks ago    • CARDIAC CATHETERIZATION  11/01/1999   • CARDIOVASCULAR STRESS TEST  09/2012   • CHOLECYSTECTOMY N/A 8/10/2018    Procedure: CHOLECYSTECTOMY LAPAROSCOPIC;  Surgeon: Ronak Downs MD;  Location: [facility] OR;  Service: General   • COLONOSCOPY     • CYSTOSCOPY BLADDER STONE LITHOTRIPSY     • ECHO - CONVERTED  09/2012   • ERCP N/A 8/14/2018    Procedure: ENDOSCOPIC RETROGRADE CHOLANGIOPANCREATOGRAPHY WITH PAPILLOTOMY;  Surgeon: Scot Su MD;  Location: [facility] OR;  Service: Gastroenterology   • ERCP N/A 10/1/2018    Procedure: ENDOSCOPIC RETROGRADE CHOLANGIOPANCREATOGRAPHY;  Surgeon: Jefry Luna MD;  Location: [facility] ENDOSCOPY;  Service: Gastroenterology   • KIDNEY STONE SURGERY     • KIDNEY STONE SURGERY      open abdominal surgery   • PORTACATH PLACEMENT Right 10/23/2018    Procedure: INSERTION OF PORTACATH;  Surgeon: Ronak Downs MD;  Location: [facility] OR;  Service: General   • UPPER GASTROINTESTINAL ENDOSCOPY  08/30/2012       FAMILY HISTORY:  Family History   Problem Relation Age of Onset   • Heart attack Mother    • Heart disease Mother    • Stroke Mother    • Kidney disease Mother    • Heart failure Mother    • Lung disease Father    • Tuberculosis Father    • Diabetes Sister    • Heart attack Brother    • Heart failure Brother    • Heart disease Brother    • Diabetes Sister    • No Known Problems Brother    • No Known Problems Brother        SOCIAL HISTORY:  Social History     Social History   • Marital status:       Spouse name: N/A   • Number of children: N/A   • Years of education: N/A     Occupational History   • Not on file.     Social History Main Topics   • Smoking status: Never Smoker   • Smokeless tobacco: Never Used   • Alcohol use No   • Drug use: No   • Sexual activity: Defer     Other Topics Concern   • Not on file     Social History Narrative    He is  and lives alone with his wife although they have 3 children together who all live close by. He is retired from the [employer] and was exposed to Agent Orange during [place]. He is a lifelong nonsmoker.         A DIL  of cancer.      REVIEW OF SYSTEMS:   A comprehensive 14 point review of systems was performed.  Significant findings as mentioned above.  All other systems reviewed and are negative.      MEDICATIONS:  The current medication list was reviewed in the EMR    Current Outpatient Prescriptions:   •  albuterol (PROVENTIL HFA;VENTOLIN HFA) 108 (90 BASE) MCG/ACT inhaler, Inhale 2 puffs Every 4 (Four) Hours As Needed for Wheezing or Shortness of Air., Disp: , Rfl:   •  apixaban (ELIQUIS) 5 MG tablet tablet, Take 1 tablet by mouth Every 12 (Twelve) Hours., Disp: 60 tablet, Rfl: 0  •  cholecalciferol (VITAMIN D3) 1000 units tablet, Take 1,000 Units by mouth Daily., Disp: , Rfl:   •  colchicine 0.6 MG tablet, Take 1 tablet by mouth Daily., Disp: 90 tablet, Rfl: 2  •  dronabinol (MARINOL) 5 MG capsule, Take 1 capsule by mouth 2 (Two) Times a Day Before Meals., Disp: 60 capsule, Rfl: 0  •  flecainide (TAMBOCOR) 50 MG tablet, Take 1 tablet by mouth Every 12 (Twelve) Hours., Disp: 60 tablet, Rfl: 0  •  FLUoxetine (PROzac) 20 MG capsule, Take 20 mg by mouth Every Night., Disp: , Rfl:   •  FLUoxetine (PROzac) 20 MG capsule, Take 20 mg by mouth Daily., Disp: , Rfl:   •  lansoprazole (PREVACID) 30 MG capsule, Take 30 mg by mouth Daily., Disp: , Rfl:   •  lidocaine-prilocaine (EMLA) 2.5-2.5 % cream, Apply topically to the appropriate area as directed  Every 2 (Two) Hours As Needed for Mild Pain ., Disp: 30 g, Rfl: 5  •  losartan (COZAAR) 100 MG tablet, Take 1 tablet by mouth Daily. (Patient taking differently: Take 25 mg by mouth Daily.), Disp: 90 tablet, Rfl: 3  •  metoprolol tartrate (LOPRESSOR) 50 MG tablet, Take 25 mg by mouth Daily., Disp: , Rfl:   •  mometasone (ASMANEX) 220 MCG/INH inhaler, Inhale 2 puffs Every Night., Disp: , Rfl:   •  montelukast (SINGULAIR) 10 MG tablet, Take 10 mg by mouth Daily., Disp: , Rfl:   •  Morphine (MS CONTIN) 30 MG 12 hr tablet, Take 1 tablet by mouth Every 12 (Twelve) Hours., Disp: 60 tablet, Rfl: 0  •  nitroglycerin (NITROSTAT) 0.4 MG SL tablet, Place 0.4 mg under the tongue as needed for chest pain., Disp: , Rfl:   •  ondansetron (ZOFRAN) 8 MG tablet, Take 1 tablet by mouth Every 8 (Eight) Hours As Needed for Nausea or Vomiting., Disp: 90 tablet, Rfl: 6  •  oxyCODONE (ROXICODONE) 5 MG immediate release tablet, Take 1-3 tabs every 4 hours as needed for pain, Disp: 200 tablet, Rfl: 0  •  pioglitazone (ACTOS) 30 MG tablet, Take 30 mg by mouth Daily., Disp: , Rfl:   •  polyethylene glycol (MIRALAX) packet, Take 17 g by mouth Daily As Needed., Disp: , Rfl:   •  prochlorperazine (COMPAZINE) 10 MG tablet, Take 1 tablet by mouth Every 6 (Six) Hours As Needed for Nausea or Vomiting., Disp: 60 tablet, Rfl: 5  •  sore muscle (ICY HOT EXTRA STRENGTH) 10-30 % cream cream, Apply 1 application topically to the appropriate area as directed 3 (Three) Times a Day As Needed (local pain)., Disp: , Rfl:   •  Tiotropium Bromide-Olodaterol 2.5-2.5 MCG/ACT aerosol solution, Inhale 2 puffs Daily., Disp: , Rfl:   No current facility-administered medications for this visit.     Facility-Administered Medications Ordered in Other Visits:   •  ondansetron (ZOFRAN) 8 mg in sodium chloride 0.9 % 50 mL IVPB, 8 mg, Intravenous, Once, Laura Jackman APRN, 8 mg at 10/30/18 1298    ALLERGIES:    Allergies   Allergen Reactions   • Contrast Dye Other  (See Comments)     Can't have due to kidney failure per family       PHYSICAL EXAM:  Vitals:    10/30/18 1027   BP: 144/80   Pulse: 96   Resp: 20   Temp: 96.5 °F (35.8 °C)   SpO2: 97%   General:  Awake, alert and oriented. Appears fatigued.   HEENT:  Pupils are equal, round and reactive to light and accommodation, Extra-ocular movements full, Oropharyx clear, mucous membranes dry  Neck:  No JVD, thyromegaly or lymphadenopathy  CV:  Regular rate and rhythm, no murmurs, rubs or gallops  Resp:  Lungs are clear to auscultation bilaterally  Abd:  Soft, somewhat tender to palpation over the epigastric and LUQ areas, non-distended, bowel sounds hypoactive, no organomegaly or masses.  Surgical incisions well-healed.  Ext:  No clubbing, cyanosis or edema  Lymph:  No cervical, supraclavicular, axillary,adenopathy  Neuro: grossly non-focal exam    PATHOLOGY:  08-15-18         ENDOSCOPY:  18 ERCP with papillotomy, balloon rotation of the biliary stricture, pathology brushings, ileum stent placement ([place])  1.  Irregular 3 cm distal common bile duct stricture concerning for neoplastic disease. Biliary papillotomy, stricture dilatation by  through the scope balloon, cytology brushings performed and 10 Maltese by 5 cm biliary stent placed.    2.  Dilated common hepatic duct to 15 mm when fully contrast filled.  Dilated intrahepatic biliary tree.    3.  No stones proximal to the stricture were identified.    4.  Slight dilatation of the distal pancreatic duct without a discrete stricture identified.    IMAGIN18 CT Abdomen Pelvis Stone Protocol   Findings  - LOWER THORAX: Clear. No effusions.  - LIVER: Homogeneous. No focal hepatic mass or ductal dilatation.  - GALLBLADDER: MINIMAL STRANDING AROUND REGION OF GALLBLADDER FOSSA  THAT MAY BE POSTSURGICAL.  - PANCREAS: Unremarkable. No mass or ductal dilatation.  - SPLEEN: Homogeneous. No splenomegaly.  - ADRENALS: No mass.  - KIDNEYS: No mass. No obstructive uropathy.   No evidence of urolithiasis.  - GI TRACT: SMALL HIATAL HERNIA.  - PERITONEUM: No free air. No free fluid or loculated fluid collections.  - MESENTERY: Unremarkable.  - LYMPH NODES: No lymphadenopathy.  - VASCULATURE: ATHEROSCLEROTIC VASCULAR CALCIFICATION.  - ABDOMINAL WALL: No focal hernia or mass.  - OTHER: None.  - BLADDER: No focal mass or significant wall thickening  - REPRODUCTIVE: Unremarkable as visualized.  - APPENDIX: Nondistended. No surrounding inflammation.  - BONES: No acute bony abnormality.     IMPRESSION:  - Minimal stranding around region of gallbladder fossa that may be postsurgical.     08-13-18 US Liver   FINDINGS:   - Visualized pancreas is unremarkable.   - The gallbladder is absent. No collection identified.   - The CBD measures [id number] mm    .  - The liver demonstrates normal echogenicity without focal lesion.    - No ascites demonstrated.        IMPRESSION:  - As above.    08-22-18 CT Abdomen Pelvis Stone Protocol   FINDINGS:   - Minimal bibasilar atelectasis.  - Hiatal hernia.  - The liver is homogeneous. There is no evidence of focal hepatic mass.  - The spleen is homogeneous.  - Stent is noted traversing the common bile duct.  - 3 mm nodule in the right lung on image 8 of the axial series.  - There is no adrenal enlargement.  - The kidneys show no evidence of hydronephrosis or hydroureter. I do not see any distal ureteral stones.   - Otherwise I do not see any free fluid or walled off fluid collections.  - There is moderate to large volume stool in the colon.  - There is no evidence of mesenteric or retroperitoneal adenopathy.     IMPRESSION:  1. Tiny nodule in the right lung base.  2. Minimal bibasilar atelectasis.  3. Moderate to large volume stool in the colon.     Other findings as above.    08-22-18 CT Abdomen Pelvis With Contrast   FINDINGS:   - Tiny left basilar nodule measuring 4 mm on image 14 of the axial series.  - Minimal bibasilar atelectasis.  - The liver is  homogeneous. There is no evidence of focal hepatic mass  - The spleen is homogeneous  - Indistinct appearance of the pancreatic head. A biliary stent is present.  - Small left adrenal nodule is present.  - The kidneys show no evidence of hydronephrosis or hydroureter. I do not see any distal ureteral stones.   - Otherwise I do not see any free fluid or walled off fluid collections.  - Large volume stool is present in the colon.     IMPRESSION:  1. Slightly enlarged, indistinct appearance of the pancreatic head.  - Underlying neoplasm certainly not excluded but no delineable mass is present. There is a biliary stent.    2. Tiny nodule in the left lung base.    3. Small left adrenal nodule.    4. Hiatal hernia.    5. Moderate to large volume stool in the colon.      09-11-18 CT Chest Without Contrast   FINDINGS:   - Minimal coronary artery calcifications present.  - No pericardial or pleural effusions.  - No parenchymal soft tissue nodules or masses. There are findings of COPD.     IMPRESSION:  - COPD.  - Hiatal hernia.  - Minimal coronary artery calcification.       09-11-18 CT Abdomen Pelvis Without Contrast   FINDINGS:   - Lung bases show mild increased interstitial markings.  - There is a hiatal hernia.  - The liver is homogeneous. There is no evidence of focal hepatic mass.  - The spleen is homogeneous.  - Mildly indistinct appearance of the pancreas. There is a biliary stent present. I cannot exclude mild degree of pancreatitis..  - There is no adrenal enlargement.  - The kidneys show no evidence of hydronephrosis or hydroureter. I do not see any distal ureteral stones.   - Otherwise I do not see any free fluid or walled off fluid collections.  - Large volume stool is present in the colon.  - Urinary bladder wall is slightly thickened.  - There is no evidence of mesenteric or retroperitoneal adenopathy.    IMPRESSION:  1. Mildly indistinct appearance of the proximal pancreas.  2. Urinary bladder wall  thickening.  3. Large volume stool in the colon.    09-14-18 CT Abdomen Without Contrast   FINDINGS:   - Again noted is very mild indistinct appearance of the pancreatic head. Stent is stable in position.  - The liver is stable and homogeneous.  - Spleen is homogeneous.  - No adrenal enlargement.  - Moderate to large volume stool in the colon.     IMPRESSION:  - No significant interval change since the previous exam.     09-21-18 NM Pet Skull Base To Mid Thigh   FINDINGS:      HEAD/NECK:  - No FDG hypermetabolic neck adenopathy.  - No FDG hypermetabolic masses.     CHEST:   - No FDG hypermetabolic thoracic adenopathy.  - No FDG hypermetabolic lung nodules or masses.  - Evaluation for tiny parenchymal nodules is somewhat limited on low dose CT secondary to respiratory motion.     ABDOMEN/PELVIS:   - There is hypermetabolic activity in the pancreatic head with a maximum SUV of 4.971.  - Physiologic FDG hypermetabolism seen throughout the GI tract.  - Physiologic FDG hypermetabolism seen throughout the mesentery, retroperitoneum and pelvis.     BONES:   - There are scattered foci of FDG hypermetabolism most suggestive of degenerative change seen throughout the axial skeleton. If concern persist for metastatic lesions of the bone, HDP Bone scan is recommended for further evaluation.     IMPRESSION:  - Abnormal hypermetabolic activity corresponding to area of indistinctness in the pancreatic head. Maximum SUV is 4.97.    RECENT LABS:  Lab Results   Component Value Date    WBC 3.08 (L) 10/30/2018    HGB 10.5 (L) 10/30/2018    HCT 32.2 (L) 10/30/2018    MCV 88.7 10/30/2018    RDW 13.0 10/30/2018     (L) 10/30/2018    NEUTRORELPCT 57.9 10/30/2018    LYMPHORELPCT 30.5 10/30/2018    MONORELPCT 9.7 10/30/2018    EOSRELPCT 0.3 10/30/2018    BASORELPCT 0.6 10/30/2018    NEUTROABS 1.78 10/30/2018    LYMPHSABS 0.94 (L) 10/30/2018       Lab Results   Component Value Date     10/30/2018    K 3.9 10/30/2018    CO2 24.0  (L) 10/30/2018     10/30/2018    BUN 28 (H) 10/30/2018    CREATININE 1.63 (H) 10/30/2018    EGFRIFNONA 42 (L) 10/30/2018    EGFRIFAFRI  09/02/2016      Comment:      <15 Indicative of kidney failure.    GLUCOSE 115 (H) 10/30/2018    CALCIUM 9.2 10/30/2018    ALKPHOS 97 10/30/2018    AST 32 10/30/2018    ALT 26 10/30/2018    BILITOT 0.5 10/30/2018    ALBUMIN 3.90 10/30/2018    PROTEINTOT 6.8 10/30/2018    MG 1.5 10/04/2018       Lab Results   Component Value Date/Time    URICACID 7.5 (H) 10/16/2018 12:18 PM     Lab Results   Component Value Date     5149 (H) 10/19/2018     7602 (H) 09/25/2018     3636 (H) 09/07/2018     4032 (H) 08/15/2018         ASSESSMENT & PLAN:  Todd Phillips is a very pleasant 68 y.o. male with presumed cancer of the head of the pancreas with a malignant appearing stricture in the CBD.    1.  Pancreatic cancer:  -Please see Dr. Alves' most recent follow up note for full workup/details from 10/19/18.   -Neoadjuvant chemotherapy with Gemcitabine and Abraxane was recommended. He underwent powerport placement and was started on treatment last week, completed cycle 1, day 1 on 10/23/18.   -Over the past few days he has been struggling with worsening fatigue, weakness, uncontrolled nausea, dehydration and constipation (see below).   -Given acute symptoms, will hold treatment today. D/w Dr. Alves and will send patient to ER for further management.        2.  Acute on chronic renal failure: Cr recently worsening and secondary to issue #3. We gave him 1L of IVF in clinic today and sending to ER for further management.     3.  Poor appetite/ uncontrolled nausea/ Dehydration:  He has been taking zofran and compazine scheduled along with Marinol 2.5 mg BID. He is eating and drinking very little. He has received IVF in ER and in our clinic for past couple of days along with Zofran IVPB with no improvement. We gave 1L of IVF in clinic today along with Zofran 8 mg IVPB.  Sending  patient to ER as above for further management.     4. Constipation: LBM was on Friday. Patient's wife has been giving him Colace 200 mg BID along with Miralax daily. She also gave him Glycerin supp this morning. He is complaining of abdominal pain and rectal pain today. Sending patient to ER as above.     5.   Neoplasm related pain:  Controlled with Morphine ER  30 mg q12h and Oxycodone 5 mg 1-3 tabs po q4h prn pain.    6.  History of Atrial fibrillation:  Chronically anticoagulated on Eliquis.     7.  ACO Quality measures  - Todd Phillips does not use tobacco products.  - Patient's Body mass index is 28.01 kg/m². BMI is above normal parameters. Recommendations include: none given current problems.  Will address in the future..  - Current outpatient and discharge medications have been reconciled for the patient.  Reviewed by: VIMAL Rolle       I spent 25 minutes with Todd Phillips today.  More than 50% of the time was spent in counseling / coordination of care for the above problems.      Electronically Signed by: VIMAL Rolle      CC:   MD Miquel Quintana MD Aaron House, MD Michael Simons, MD

## 2018-10-31 ENCOUNTER — TELEPHONE (OUTPATIENT)
Dept: ONCOLOGY | Facility: HOSPITAL | Age: 68
End: 2018-10-31

## 2018-10-31 ENCOUNTER — TELEPHONE (OUTPATIENT)
Dept: CARDIOLOGY | Facility: CLINIC | Age: 68
End: 2018-10-31

## 2018-10-31 RX ORDER — LOSARTAN POTASSIUM 100 MG/1
100 TABLET ORAL DAILY
Qty: 90 TABLET | Refills: 3 | Status: SHIPPED | OUTPATIENT
Start: 2018-10-31 | End: 2018-12-07

## 2018-11-01 ENCOUNTER — OFFICE VISIT (OUTPATIENT)
Dept: ONCOLOGY | Facility: CLINIC | Age: 68
End: 2018-11-01

## 2018-11-01 ENCOUNTER — DOCUMENTATION (OUTPATIENT)
Dept: ONCOLOGY | Facility: HOSPITAL | Age: 68
End: 2018-11-01

## 2018-11-01 ENCOUNTER — LAB (OUTPATIENT)
Dept: ONCOLOGY | Facility: CLINIC | Age: 68
End: 2018-11-01

## 2018-11-01 VITALS
TEMPERATURE: 97.8 F | OXYGEN SATURATION: 97 % | HEART RATE: 76 BPM | SYSTOLIC BLOOD PRESSURE: 94 MMHG | DIASTOLIC BLOOD PRESSURE: 65 MMHG | RESPIRATION RATE: 18 BRPM

## 2018-11-01 DIAGNOSIS — C25.0 MALIGNANT NEOPLASM OF HEAD OF PANCREAS (HCC): ICD-10-CM

## 2018-11-01 DIAGNOSIS — K59.00 CONSTIPATION, UNSPECIFIED CONSTIPATION TYPE: ICD-10-CM

## 2018-11-01 DIAGNOSIS — R11.0 NAUSEA: ICD-10-CM

## 2018-11-01 DIAGNOSIS — E86.0 DEHYDRATION: ICD-10-CM

## 2018-11-01 DIAGNOSIS — C25.0 MALIGNANT NEOPLASM OF HEAD OF PANCREAS (HCC): Primary | ICD-10-CM

## 2018-11-01 LAB
ALBUMIN SERPL-MCNC: 3.8 G/DL (ref 3.4–4.8)
ALBUMIN/GLOB SERPL: 1.3 G/DL (ref 1.5–2.5)
ALP SERPL-CCNC: 103 U/L (ref 40–129)
ALT SERPL W P-5'-P-CCNC: 22 U/L (ref 10–44)
ANION GAP SERPL CALCULATED.3IONS-SCNC: 6.1 MMOL/L (ref 3.6–11.2)
AST SERPL-CCNC: 29 U/L (ref 10–34)
BASOPHILS # BLD AUTO: 0.01 10*3/MM3 (ref 0–0.3)
BASOPHILS NFR BLD AUTO: 0.2 % (ref 0–2)
BILIRUB SERPL-MCNC: 0.5 MG/DL (ref 0.2–1.8)
BUN BLD-MCNC: 15 MG/DL (ref 7–21)
BUN/CREAT SERPL: 11.7 (ref 7–25)
CALCIUM SPEC-SCNC: 9.2 MG/DL (ref 7.7–10)
CHLORIDE SERPL-SCNC: 105 MMOL/L (ref 99–112)
CO2 SERPL-SCNC: 24.9 MMOL/L (ref 24.3–31.9)
CREAT BLD-MCNC: 1.28 MG/DL (ref 0.43–1.29)
DEPRECATED RDW RBC AUTO: 40.4 FL (ref 37–54)
EOSINOPHIL # BLD AUTO: 0.04 10*3/MM3 (ref 0–0.7)
EOSINOPHIL NFR BLD AUTO: 0.8 % (ref 0–7)
ERYTHROCYTE [DISTWIDTH] IN BLOOD BY AUTOMATED COUNT: 12.9 % (ref 11.5–14.5)
GFR SERPL CREATININE-BSD FRML MDRD: 56 ML/MIN/1.73
GLOBULIN UR ELPH-MCNC: 3 GM/DL
GLUCOSE BLD-MCNC: 116 MG/DL (ref 70–110)
HCT VFR BLD AUTO: 32.2 % (ref 42–52)
HGB BLD-MCNC: 10.7 G/DL (ref 14–18)
IMM GRANULOCYTES # BLD: 0.03 10*3/MM3 (ref 0–0.03)
IMM GRANULOCYTES NFR BLD: 0.6 % (ref 0–0.5)
LYMPHOCYTES # BLD AUTO: 1.02 10*3/MM3 (ref 1–3)
LYMPHOCYTES NFR BLD AUTO: 19.7 % (ref 16–46)
MAGNESIUM SERPL-MCNC: 1.7 MG/DL (ref 1.7–2.6)
MCH RBC QN AUTO: 29.6 PG (ref 27–33)
MCHC RBC AUTO-ENTMCNC: 33.2 G/DL (ref 33–37)
MCV RBC AUTO: 89.2 FL (ref 80–94)
MONOCYTES # BLD AUTO: 0.57 10*3/MM3 (ref 0.1–0.9)
MONOCYTES NFR BLD AUTO: 11 % (ref 0–12)
NEUTROPHILS # BLD AUTO: 3.51 10*3/MM3 (ref 1.4–6.5)
NEUTROPHILS NFR BLD AUTO: 67.7 % (ref 40–75)
OSMOLALITY SERPL CALC.SUM OF ELEC: 273.8 MOSM/KG (ref 273–305)
PLATELET # BLD AUTO: 128 10*3/MM3 (ref 130–400)
PMV BLD AUTO: 10 FL (ref 6–10)
POTASSIUM BLD-SCNC: 4 MMOL/L (ref 3.5–5.3)
PROT SERPL-MCNC: 6.8 G/DL (ref 6–8)
RBC # BLD AUTO: 3.61 10*6/MM3 (ref 4.7–6.1)
SODIUM BLD-SCNC: 136 MMOL/L (ref 135–153)
WBC NRBC COR # BLD: 5.18 10*3/MM3 (ref 4.5–12.5)

## 2018-11-01 PROCEDURE — 80053 COMPREHEN METABOLIC PANEL: CPT | Performed by: INTERNAL MEDICINE

## 2018-11-01 PROCEDURE — 83735 ASSAY OF MAGNESIUM: CPT | Performed by: INTERNAL MEDICINE

## 2018-11-01 PROCEDURE — 99211 OFF/OP EST MAY X REQ PHY/QHP: CPT | Performed by: NURSE PRACTITIONER

## 2018-11-01 PROCEDURE — 85025 COMPLETE CBC W/AUTO DIFF WBC: CPT | Performed by: INTERNAL MEDICINE

## 2018-11-01 NOTE — PROGRESS NOTES
SS received inbaskets on patient as follows:    spoke with pt's wife and she was wondering if we had heard anything from home health? She said she thought they were ordered to come out to give him fluids and zofran     Wife called asking about home health coming out to give IVF. Do you know anything about this?     I asked Dr. Alves about home health for Todd and she doesn't want it right now.  Pt is coming back tomorrow for an acute visit.  Pt's wife is aware     SS spoke with Chan RN regarding these messages.  SS will be glad to assist with needs as ordered by MD.  SS will follow.

## 2018-11-01 NOTE — PROGRESS NOTES
SS received order per Tamara Morejon per Dr. Alves to arrange IV fluids for five days and IV Zofran every eight hours for the next five days.  SS contacted [facility] [phone] per Georgina to arrange home health services as above orders.  SS faxed [phone] face sheet, MD order, and dictation notes.  SS contacted [facility] [phone] per Matilde to arrange above orders.  Aranza unable to get IV fluids and IV Zofran to patient's home in the morning.  SS faxed [phone] face sheet, MD orders, and dictation notes.  SS will follow.

## 2018-11-01 NOTE — PROGRESS NOTES
Date: 2018    Patient Name: Todd Phillips   : 1950   ID: [id number]    Complaint: Patient presents to clinic via wheelchair with daughter with reports of weakness, nausea, vomiting and confusion per his daughter. He states his appetite is good, but when he tries to eat he starts to vomit. He is able to take small sips at a time of Glucerna and water and estimates he is drinking the equivalent of a half of a gallon of water per day for the last 2 days. He denies further constipation and states after taking Linzess, he is able to have a bowel movement.       VS:   BP 94/65   Pulse 76   Temp 97.8 °F (36.6 °C) (Oral)   Resp 18   SpO2 97%        Labs:       Lab Results   Component Value Date    WBC 5.18 2018    HGB 10.7 (L) 2018    HCT 32.2 (L) 2018    MCV 89.2 2018    RDW 12.9 2018     (L) 2018    NEUTRORELPCT 67.7 2018    LYMPHORELPCT 19.7 2018    MONORELPCT 11.0 2018    EOSRELPCT 0.8 2018    BASORELPCT 0.2 2018    NEUTROABS 3.51 2018    LYMPHSABS 1.02 2018       Lab Results   Component Value Date     2018    K 4.0 2018    CO2 24.9 2018     2018    BUN 15 2018    CREATININE 1.28 2018    GLUCOSE 116 (H) 2018    CALCIUM 9.2 2018    ALKPHOS 103 2018    AST 29 2018    ALT 22 2018    BILITOT 0.5 2018    ALBUMIN 3.80 2018    PROTEINTOT 6.8 2018    MG 1.7 2018       Lab Results   Component Value Date    URICACID 7.5 (H) 10/16/2018        Imaging Results (last 24 hours)     ** No results found for the last 24 hours. **          Meds Due: see below     Action: VIMAL Rolle notified of patient condition and complaints. Home health arranged with [facility] for 8 mg Zofran IV Q8h and IVF 1 L daily x 5 days and return to see Dr. Alves on  for follow up. Patient was also given Linzess 142 mcg 1 PO daily. Home  health arrangements discussed with BHANU Morrissey who will make arrangements, and when we have a follow up time for 11/6, Keya Ramon will notify patient. Instructed patient and daughter on MD recommendations and s/sx to report to physician. Patient and daughter verbalize understanding of all instructions.     Nurse: Tamara Morejon, RN    Provider Notes:   Todd Phillips presents today an acute visit. He was seen by me on 10/30/18 for an acute visit (please see note for further details) and was sent to ER for worsening fatigue, weakness, dehydration, JOVANNA, uncontrolled n/v and constipation. Patient was not admitted. Labs today showing normalized Cr but continues to struggle with eating and drinking (due to nausea). Agree with the above assessment and management. Will arrange for IVF with HH for dehydration and Zofran IVPB q 8 hours. Patient will follow up with Dr. Alves next week. If he continues to have ongoing n/v, could give him a trial of Sancuso Patch.       Electronically Signed By: VIMAL Rolle    Date Signed: 11/01/18

## 2018-11-02 ENCOUNTER — OFFICE VISIT (OUTPATIENT)
Dept: CARDIOLOGY | Facility: CLINIC | Age: 68
End: 2018-11-02

## 2018-11-02 VITALS — SYSTOLIC BLOOD PRESSURE: 130 MMHG | DIASTOLIC BLOOD PRESSURE: 62 MMHG | HEART RATE: 88 BPM

## 2018-11-02 DIAGNOSIS — I48.19 PERSISTENT ATRIAL FIBRILLATION (HCC): Primary | ICD-10-CM

## 2018-11-02 LAB
BACTERIA SPEC AEROBE CULT: NORMAL
BACTERIA SPEC AEROBE CULT: NORMAL

## 2018-11-02 PROCEDURE — 93000 ELECTROCARDIOGRAM COMPLETE: CPT | Performed by: INTERNAL MEDICINE

## 2018-11-02 PROCEDURE — 99213 OFFICE O/P EST LOW 20 MIN: CPT | Performed by: INTERNAL MEDICINE

## 2018-11-02 NOTE — PROGRESS NOTES
Subjective:   Todd Phillips  1950  [phone]      04/30/2018    [facility] CARDIOLOGY    Adiel Denney MD  [address] [place] [address] [place] [address]      Patient ID: Todd Phillips is a 68 y.o. male.    Chief Complaint:   Chief Complaint   Patient presents with   • Atrial Fibrillation     Problem List:  1) Paroxsymal Atrial fibrillation, onset 10/2017   A. Started on Flecainide, continued on BBL   B. CHADSVASC = 3, on Eliquis.   C. Echocardiogram 10/2017- normal Ef   D. Stress test 10/2017- no ischemia  2) Type II DM  3) Hypertension  4) Hyperlipidemia  5) Chronic kidney disease  6) ELSY, wears CPAP    Allergies   Allergen Reactions   • Contrast Dye Other (See Comments)     Can't have due to kidney failure per family       Current Outpatient Prescriptions:   •  albuterol (PROVENTIL HFA;VENTOLIN HFA) 108 (90 BASE) MCG/ACT inhaler, Inhale 2 puffs Every 4 (Four) Hours As Needed for Wheezing or Shortness of Air., Disp: , Rfl:   •  apixaban (ELIQUIS) 5 MG tablet tablet, Take 1 tablet by mouth Every 12 (Twelve) Hours., Disp: 60 tablet, Rfl: 0  •  cholecalciferol (VITAMIN D3) 1000 units tablet, Take 1,000 Units by mouth Daily., Disp: , Rfl:   •  colchicine 0.6 MG tablet, Take 1 tablet by mouth Daily., Disp: 90 tablet, Rfl: 2  •  dronabinol (MARINOL) 5 MG capsule, Take 1 capsule by mouth 2 (Two) Times a Day Before Meals., Disp: 60 capsule, Rfl: 0  •  flecainide (TAMBOCOR) 50 MG tablet, Take 1 tablet by mouth Every 12 (Twelve) Hours., Disp: 60 tablet, Rfl: 0  •  FLUoxetine (PROzac) 20 MG capsule, Take 20 mg by mouth Every Night., Disp: , Rfl:   •  lansoprazole (PREVACID) 30 MG capsule, Take 30 mg by mouth Daily., Disp: , Rfl:   •  lidocaine-prilocaine (EMLA) 2.5-2.5 % cream, Apply topically to the appropriate area as directed Every 2 (Two) Hours As Needed for Mild Pain ., Disp: 30 g, Rfl: 5  •  linaclotide (LINZESS) 145 MCG capsule capsule, Take 1 capsule by mouth Every Morning Before  Breakfast., Disp: 30 capsule, Rfl: 3  •  losartan (COZAAR) 100 MG tablet, Take 1 tablet by mouth Daily. (Patient taking differently: Take 25 mg by mouth Daily.), Disp: 90 tablet, Rfl: 3  •  metoprolol tartrate (LOPRESSOR) 50 MG tablet, Take 25 mg by mouth As Needed., Disp: , Rfl:   •  mometasone (ASMANEX) 220 MCG/INH inhaler, Inhale 2 puffs Every Night., Disp: , Rfl:   •  montelukast (SINGULAIR) 10 MG tablet, Take 10 mg by mouth Daily., Disp: , Rfl:   •  Morphine (MS CONTIN) 30 MG 12 hr tablet, Take 1 tablet by mouth Every 12 (Twelve) Hours., Disp: 60 tablet, Rfl: 0  •  nitroglycerin (NITROSTAT) 0.4 MG SL tablet, Place 0.4 mg under the tongue as needed for chest pain., Disp: , Rfl:   •  ondansetron (ZOFRAN) 8 MG tablet, Take 1 tablet by mouth Every 8 (Eight) Hours As Needed for Nausea or Vomiting., Disp: 90 tablet, Rfl: 6  •  oxyCODONE (ROXICODONE) 5 MG immediate release tablet, Take 1-3 tabs every 4 hours as needed for pain, Disp: 200 tablet, Rfl: 0  •  pioglitazone (ACTOS) 30 MG tablet, Take 30 mg by mouth Daily., Disp: , Rfl:   •  polyethylene glycol (MIRALAX) packet, Take 17 g by mouth Daily As Needed., Disp: , Rfl:   •  prochlorperazine (COMPAZINE) 10 MG tablet, Take 1 tablet by mouth Every 6 (Six) Hours As Needed for Nausea or Vomiting., Disp: 60 tablet, Rfl: 5  •  sore muscle (ICY HOT EXTRA STRENGTH) 10-30 % cream cream, Apply 1 application topically to the appropriate area as directed 3 (Three) Times a Day As Needed (local pain)., Disp: , Rfl:   •  Tiotropium Bromide-Olodaterol 2.5-2.5 MCG/ACT aerosol solution, Inhale 2 puffs Daily., Disp: , Rfl:   No current facility-administered medications for this visit.     Facility-Administered Medications Ordered in Other Visits:   •  sodium chloride 0.9 % infusion  - ADS Override Pull, , , ,   •  sodium chloride 0.9 % infusion  - ADS Override Pull, , , ,     History of Present Illness: Mr. Phillips is a 66 y/o male with the above noted past medical history who  presentslogan dickerson for f/u on Afib. He had new onset Afib October 2017 but today he reports having one episode about a year ago prior to this event in October. He was not evaluated for this but reports his heart rate was very high at this time. He was started on Flecainide and lopressor. He is s/p removal of his gallbladder in Aug and then had to have a bile stent followed by double PNA , sepsis and given IV antibiotics and then recurrent E coli. About 3 weeks ago was having fevers and found issues at the ball duct with recurrent infections and at [facility] had another stent placed. Pancreatic lesion also found and biopsy and suspicious for cancer. Just had his first chemo and port in place.     No chest pain, syncope or LH noted. Did have some Afib with RVR during an infection but now back in rhythm. Also dehydrated at times and O2 at times sometimes lower. Bp also running lower at times. Home health following     The following portions of the patient's history were reviewed and updated as appropriate: allergies, current medications, past family history, past medical history, past social history, past surgical history and problem list.    ROS   14 point ROS negative except as outlined in problem list, HPI and other parts of the note.      ECG 12 Lead  Date/Time: 11/2/2018 2:45 PM  Performed by: BRENNAN DIALLO  Authorized by: BRENNAN DIALLO   Rhythm: sinus rhythm  Comments: QRS 88 msec.                Objective:       Vitals:    11/02/18 1422   BP: 130/62   BP Location: Left arm   Patient Position: Sitting   Pulse: 88     There is no height or weight on file to calculate BMI.    Physical Exam:  GENERAL: NAD in Wheelchair  HEENT: Normocephalic, atraumatic, PERRLA. Moist mucous membranes.  NECK: No JVD present at 30°. No carotid bruits auscultated.  LUNGS: Non labored. Clear to auscultation.  CARDIOVASCULAR: Regular rate and rhythm. No murmurs, gallops or rubs noted.   ABDOMEN: Soft, nontender. Non-distended. positive  bowel sounds.  MUSCULOSKELETAL: No gross deformities. No clubbing, cyanosis, or lower extremity edema.  SKIN: Pink, warm.   Neuro: Nonfocal exam grossly intact.  Ext: No edema or bruising    The patient's old records including ambulatory rhythm recordings (ECGs, Holter/event monitor) were reviewed and discussed.      Lab Review:   Results for orders placed or performed in visit on 11/01/18   Comprehensive Metabolic Panel   Result Value Ref Range    Glucose 116 (H) 70 - 110 mg/dL    BUN 15 7 - 21 mg/dL    Creatinine 1.28 0.43 - 1.29 mg/dL    Sodium 136 135 - 153 mmol/L    Potassium 4.0 3.5 - 5.3 mmol/L    Chloride 105 99 - 112 mmol/L    CO2 24.9 24.3 - 31.9 mmol/L    Calcium 9.2 7.7 - 10.0 mg/dL    Total Protein 6.8 6.0 - 8.0 g/dL    Albumin 3.80 3.40 - 4.80 g/dL    ALT (SGPT) 22 10 - 44 U/L    AST (SGOT) 29 10 - 34 U/L    Alkaline Phosphatase 103 40 - 129 U/L    Total Bilirubin 0.5 0.2 - 1.8 mg/dL    eGFR Non African Amer 56 (L) >60 mL/min/1.73    Globulin 3.0 gm/dL    A/G Ratio 1.3 (L) 1.5 - 2.5 g/dL    BUN/Creatinine Ratio 11.7 7.0 - 25.0    Anion Gap 6.1 3.6 - 11.2 mmol/L   Magnesium   Result Value Ref Range    Magnesium 1.7 1.7 - 2.6 mg/dL   CBC Auto Differential   Result Value Ref Range    WBC 5.18 4.50 - 12.50 10*3/mm3    RBC 3.61 (L) 4.70 - 6.10 10*6/mm3    Hemoglobin 10.7 (L) 14.0 - 18.0 g/dL    Hematocrit 32.2 (L) 42.0 - 52.0 %    MCV 89.2 80.0 - 94.0 fL    MCH 29.6 27.0 - 33.0 pg    MCHC 33.2 33.0 - 37.0 g/dL    RDW 12.9 11.5 - 14.5 %    RDW-SD 40.4 37.0 - 54.0 fl    MPV 10.0 6.0 - 10.0 fL    Platelets 128 (L) 130 - 400 10*3/mm3    Neutrophil % 67.7 40.0 - 75.0 %    Lymphocyte % 19.7 16.0 - 46.0 %    Monocyte % 11.0 0.0 - 12.0 %    Eosinophil % 0.8 0.0 - 7.0 %    Basophil % 0.2 0.0 - 2.0 %    Immature Grans % 0.6 (H) 0.0 - 0.5 %    Neutrophils, Absolute 3.51 1.40 - 6.50 10*3/mm3    Lymphocytes, Absolute 1.02 1.00 - 3.00 10*3/mm3    Monocytes, Absolute 0.57 0.10 - 0.90 10*3/mm3    Eosinophils, Absolute 0.04  0.00 - 0.70 10*3/mm3    Basophils, Absolute 0.01 0.00 - 0.30 10*3/mm3    Immature Grans, Absolute 0.03 0.00 - 0.03 10*3/mm3   Osmolality, Calculated   Result Value Ref Range    Osmolality Calc 273.8 273.0 - 305.0 mOsm/kg           Diagnosis:   1. Paroxysmal atrial fibrillation  2. Essential hypertension  Assessment & Plan:   1) Paroxsymal atrial fibrillation, new onset October 2017, started on Flecainide. QRS acceptable. Appears to be maintaining NSR now with only episode of Afib during an infectious issue. On Eliquis and lopressor was well.   Continue current medications once able.    2) Hypertension  Lower at times. Hold on all BP pills for now.     3) Infectious issues and newly pancreatic cancer now getting chemo dealing with lower O2 sats. Needs to discuss with primary about possibility of ATc oxygen. Multiple medical issues more pressing then any other cardiac issues at this time.     F/U 6 months and follow up with Dr Denney's office later today    Fidel Lozano DO  2:38 PM  11/02/18

## 2018-11-04 LAB
BACTERIA SPEC AEROBE CULT: NORMAL
BACTERIA SPEC AEROBE CULT: NORMAL

## 2018-11-06 ENCOUNTER — LAB (OUTPATIENT)
Dept: ONCOLOGY | Facility: CLINIC | Age: 68
End: 2018-11-06

## 2018-11-06 ENCOUNTER — INFUSION (OUTPATIENT)
Dept: ONCOLOGY | Facility: HOSPITAL | Age: 68
End: 2018-11-06
Attending: INTERNAL MEDICINE

## 2018-11-06 ENCOUNTER — OFFICE VISIT (OUTPATIENT)
Dept: ONCOLOGY | Facility: CLINIC | Age: 68
End: 2018-11-06

## 2018-11-06 VITALS
DIASTOLIC BLOOD PRESSURE: 73 MMHG | SYSTOLIC BLOOD PRESSURE: 115 MMHG | OXYGEN SATURATION: 98 % | WEIGHT: 207 LBS | HEART RATE: 92 BPM | HEART RATE: 92 BPM | TEMPERATURE: 98.4 F | BODY MASS INDEX: 28.07 KG/M2 | SYSTOLIC BLOOD PRESSURE: 115 MMHG | OXYGEN SATURATION: 98 % | TEMPERATURE: 98.4 F | BODY MASS INDEX: 28.07 KG/M2 | DIASTOLIC BLOOD PRESSURE: 73 MMHG | RESPIRATION RATE: 18 BRPM | RESPIRATION RATE: 18 BRPM | WEIGHT: 207 LBS

## 2018-11-06 DIAGNOSIS — C25.0 MALIGNANT NEOPLASM OF HEAD OF PANCREAS (HCC): Primary | ICD-10-CM

## 2018-11-06 DIAGNOSIS — N18.30 CKD (CHRONIC KIDNEY DISEASE), STAGE III (HCC): ICD-10-CM

## 2018-11-06 DIAGNOSIS — G89.3 NEOPLASM RELATED PAIN: ICD-10-CM

## 2018-11-06 DIAGNOSIS — E86.0 DEHYDRATION: ICD-10-CM

## 2018-11-06 DIAGNOSIS — C25.0 MALIGNANT NEOPLASM OF HEAD OF PANCREAS (HCC): ICD-10-CM

## 2018-11-06 DIAGNOSIS — R11.0 NAUSEA: ICD-10-CM

## 2018-11-06 LAB
ALBUMIN SERPL-MCNC: 3.9 G/DL (ref 3.4–4.8)
ALBUMIN/GLOB SERPL: 1.3 G/DL (ref 1.5–2.5)
ALP SERPL-CCNC: 109 U/L (ref 40–129)
ALT SERPL W P-5'-P-CCNC: 21 U/L (ref 10–44)
ANION GAP SERPL CALCULATED.3IONS-SCNC: 5.6 MMOL/L (ref 3.6–11.2)
AST SERPL-CCNC: 25 U/L (ref 10–34)
BASOPHILS # BLD AUTO: 0.01 10*3/MM3 (ref 0–0.3)
BASOPHILS NFR BLD AUTO: 0.2 % (ref 0–2)
BILIRUB SERPL-MCNC: 0.6 MG/DL (ref 0.2–1.8)
BUN BLD-MCNC: 13 MG/DL (ref 7–21)
BUN/CREAT SERPL: 11.3 (ref 7–25)
CALCIUM SPEC-SCNC: 9.3 MG/DL (ref 7.7–10)
CHLORIDE SERPL-SCNC: 103 MMOL/L (ref 99–112)
CO2 SERPL-SCNC: 26.4 MMOL/L (ref 24.3–31.9)
CREAT BLD-MCNC: 1.15 MG/DL (ref 0.43–1.29)
DEPRECATED RDW RBC AUTO: 39.8 FL (ref 37–54)
EOSINOPHIL # BLD AUTO: 0.06 10*3/MM3 (ref 0–0.7)
EOSINOPHIL NFR BLD AUTO: 1.2 % (ref 0–7)
ERYTHROCYTE [DISTWIDTH] IN BLOOD BY AUTOMATED COUNT: 13.3 % (ref 11.5–14.5)
GFR SERPL CREATININE-BSD FRML MDRD: 63 ML/MIN/1.73
GLOBULIN UR ELPH-MCNC: 3 GM/DL
GLUCOSE BLD-MCNC: 132 MG/DL (ref 70–110)
HCT VFR BLD AUTO: 32.4 % (ref 42–52)
HGB BLD-MCNC: 10.8 G/DL (ref 14–18)
IMM GRANULOCYTES # BLD: 0.04 10*3/MM3 (ref 0–0.03)
IMM GRANULOCYTES NFR BLD: 0.8 % (ref 0–0.5)
LYMPHOCYTES # BLD AUTO: 0.96 10*3/MM3 (ref 1–3)
LYMPHOCYTES NFR BLD AUTO: 18.8 % (ref 16–46)
MCH RBC QN AUTO: 29.4 PG (ref 27–33)
MCHC RBC AUTO-ENTMCNC: 33.3 G/DL (ref 33–37)
MCV RBC AUTO: 88.3 FL (ref 80–94)
MONOCYTES # BLD AUTO: 0.66 10*3/MM3 (ref 0.1–0.9)
MONOCYTES NFR BLD AUTO: 12.9 % (ref 0–12)
NEUTROPHILS # BLD AUTO: 3.39 10*3/MM3 (ref 1.4–6.5)
NEUTROPHILS NFR BLD AUTO: 66.1 % (ref 40–75)
OSMOLALITY SERPL CALC.SUM OF ELEC: 272.1 MOSM/KG (ref 273–305)
PLATELET # BLD AUTO: 304 10*3/MM3 (ref 130–400)
PMV BLD AUTO: 9.3 FL (ref 6–10)
POTASSIUM BLD-SCNC: 3.9 MMOL/L (ref 3.5–5.3)
PROT SERPL-MCNC: 6.9 G/DL (ref 6–8)
RBC # BLD AUTO: 3.67 10*6/MM3 (ref 4.7–6.1)
SODIUM BLD-SCNC: 135 MMOL/L (ref 135–153)
WBC NRBC COR # BLD: 5.12 10*3/MM3 (ref 4.5–12.5)

## 2018-11-06 PROCEDURE — 80053 COMPREHEN METABOLIC PANEL: CPT | Performed by: INTERNAL MEDICINE

## 2018-11-06 PROCEDURE — 25010000002 LORAZEPAM PER 2 MG: Performed by: INTERNAL MEDICINE

## 2018-11-06 PROCEDURE — 96413 CHEMO IV INFUSION 1 HR: CPT | Performed by: NURSE PRACTITIONER

## 2018-11-06 PROCEDURE — 25010000002 PACLITAXEL PROTEIN-BOUND PART PER 1 MG: Performed by: INTERNAL MEDICINE

## 2018-11-06 PROCEDURE — 99214 OFFICE O/P EST MOD 30 MIN: CPT | Performed by: INTERNAL MEDICINE

## 2018-11-06 PROCEDURE — 25010000002 HYDROMORPHONE 1 MG/ML SOLUTION: Performed by: INTERNAL MEDICINE

## 2018-11-06 PROCEDURE — 96417 CHEMO IV INFUS EACH ADDL SEQ: CPT | Performed by: NURSE PRACTITIONER

## 2018-11-06 PROCEDURE — 25010000002 DEXAMETHASONE SODIUM PHOSPHATE 20 MG/5ML SOLUTION 5 ML VIAL: Performed by: INTERNAL MEDICINE

## 2018-11-06 PROCEDURE — 25010000002 GEMCITABINE HCL 2 GM/20ML SOLUTION 20 ML VIAL: Performed by: INTERNAL MEDICINE

## 2018-11-06 PROCEDURE — 96375 TX/PRO/DX INJ NEW DRUG ADDON: CPT | Performed by: NURSE PRACTITIONER

## 2018-11-06 PROCEDURE — 96367 TX/PROPH/DG ADDL SEQ IV INF: CPT | Performed by: NURSE PRACTITIONER

## 2018-11-06 PROCEDURE — 25010000002 FOSAPREPITANT PER 1 MG: Performed by: INTERNAL MEDICINE

## 2018-11-06 PROCEDURE — 85025 COMPLETE CBC W/AUTO DIFF WBC: CPT | Performed by: INTERNAL MEDICINE

## 2018-11-06 RX ORDER — PACLITAXEL 100 MG/20ML
270 INJECTION, POWDER, LYOPHILIZED, FOR SUSPENSION INTRAVENOUS ONCE
Status: COMPLETED | OUTPATIENT
Start: 2018-11-06 | End: 2018-11-06

## 2018-11-06 RX ORDER — SODIUM CHLORIDE 0.9 % (FLUSH) 0.9 %
10 SYRINGE (ML) INJECTION AS NEEDED
Status: CANCELLED | OUTPATIENT
Start: 2018-11-13

## 2018-11-06 RX ORDER — LORAZEPAM 2 MG/ML
1 INJECTION INTRAMUSCULAR ONCE
Status: COMPLETED | OUTPATIENT
Start: 2018-11-06 | End: 2018-11-06

## 2018-11-06 RX ORDER — PREDNISONE 10 MG/1
TABLET ORAL
Qty: 20 TABLET | Refills: 0 | Status: SHIPPED | OUTPATIENT
Start: 2018-11-06 | End: 2018-11-27 | Stop reason: SDUPTHER

## 2018-11-06 RX ORDER — LORAZEPAM 0.5 MG/1
0.5 TABLET ORAL EVERY 8 HOURS PRN
Qty: 60 TABLET | Refills: 0 | Status: SHIPPED | OUTPATIENT
Start: 2018-11-06 | End: 2018-11-20 | Stop reason: SDUPTHER

## 2018-11-06 RX ORDER — SODIUM CHLORIDE 0.9 % (FLUSH) 0.9 %
10 SYRINGE (ML) INJECTION AS NEEDED
Status: DISCONTINUED | OUTPATIENT
Start: 2018-11-06 | End: 2018-11-06 | Stop reason: HOSPADM

## 2018-11-06 RX ORDER — SODIUM CHLORIDE 9 MG/ML
250 INJECTION, SOLUTION INTRAVENOUS ONCE
Status: COMPLETED | OUTPATIENT
Start: 2018-11-06 | End: 2018-11-06

## 2018-11-06 RX ADMIN — HYDROMORPHONE HYDROCHLORIDE 1 MG: 1 INJECTION, SOLUTION INTRAMUSCULAR; INTRAVENOUS; SUBCUTANEOUS at 12:13

## 2018-11-06 RX ADMIN — SODIUM CHLORIDE 250 ML: 9 INJECTION, SOLUTION INTRAVENOUS at 12:10

## 2018-11-06 RX ADMIN — LORAZEPAM 1 MG: 2 INJECTION INTRAMUSCULAR; INTRAVENOUS at 12:10

## 2018-11-06 RX ADMIN — DEXAMETHASONE SODIUM PHOSPHATE 12 MG: 4 INJECTION, SOLUTION INTRAMUSCULAR; INTRAVENOUS at 12:20

## 2018-11-06 RX ADMIN — GEMCITABINE 2150 MG: 100 INJECTION, SOLUTION INTRAVENOUS at 14:23

## 2018-11-06 RX ADMIN — PACLITAXEL 270 MG: 100 INJECTION, POWDER, LYOPHILIZED, FOR SUSPENSION INTRAVENOUS at 13:40

## 2018-11-06 RX ADMIN — Medication 10 ML: at 15:15

## 2018-11-06 RX ADMIN — SODIUM CHLORIDE, PRESERVATIVE FREE 500 UNITS: 5 INJECTION INTRAVENOUS at 15:15

## 2018-11-06 RX ADMIN — SODIUM CHLORIDE 150 MG: 9 INJECTION, SOLUTION INTRAVENOUS at 12:55

## 2018-11-06 NOTE — PROGRESS NOTES
DATE : 11/06/18    DIAGNOSIS:   1.  Pancreatic cancer    2.  Repeated episodes of bacteremia - Klebsiella pneumoniae in August 2018, ESBL E coli in September 2018    CHIEF COMPLAINT:  Abdominal pain, constipation  Fatigue, weakness  Poor appetite, ongoing nausea    TREATMENT:      HISTORY OF PRESENT ILLNESS:   Todd Phillips is a very pleasant 68 y.o. male who is being seen today at the request of Dr. Miquel Brody for evaluation and treatment of pancreatic cancer.  Mr. Phillips initially developed symptoms in January of 2018. At that time he had pain in his upper abdomen which would radiate to his back.  In July, the pain intensified.  He was seen in the ER and also by Dr. Su.  He underwent RUQ U/S and then HIDA scan and it was determined he had a dysfunctional gallbladder.  He saw Dr. Downs and had cholecystectomy on 8-10-18.  Pathology showed chronic cholecystitis and an incidental benign lymph node.  He re-presented in the post-operative period with jaundice.  Dr. Su performed ERCP on8-14-18.  He was noted to have a 3 cm irregular tight stricture of the distal common bile duct with proximal biliary ductal dilatation.  Biliary papillotomy and stricture dilatation performed and brushings taken.  A 10 Fr x 5 cm biliary stent was placed.  Brushings were taken but were negative.  There was sl dilatation of the distal pancreatic duct without discrete stricture.   He was discharged with improving jaundice on 8-16.  On 8-21 he represented with sepsis due to Klebsiella pneumonia bacteremia and was admitted.  He then was referred to Dr. Brody for evaluation and treatment.  He has been set up for EUS with biopsy next Friday for what appears to be a primary pancreatic malignancy in the head of the pancreas.  Dr. Brody has referred him for consideration of neoadjuvant therapy.    Mr. Phillips complains today of pain which is not controlled.  He has been taking Morphine 15 mg q12h and norco 5  I tab q6h.  He isn't  sleeping because of the pain.   He previously struggled with abdominal pain but has had diarrhea after every meal since his cholecystectomy.    INTERVAL HISTORY:  Mr. Phillips presents today, accompanied by his son for follow up of pancreatic cancer and toxicity check. He underwent powerport placement and was recently started on neoadjuvant chemotherapy with Gemcitabine and Abraxane, completed cycle 1, day 1.  D8 treatment was delayed by a week for nausea/vomiting/dehydration / generalized weakness.  He has been getting daily IVF hydration and zofran which has been helpful, but he has continued to feel poorly and starting yesterday afternoon / evening his symtpoms worsened.   Abdominal pain and nausea gradually became worse last night and continued to this morning. Bowels have been moving, last night was last bowel movement (moved twice yesterday).  No vomiting. He complains of gout flare involving cecily feet with L > R. His wife is concerned she may have missed giving him his evening dose of Morphine last night.  He hasn't been taking Oxycodone very much (ususally 5 mg twice a day), but he says this is because he often forgets to ask for it and his wife handles his medicatons.    PAST MEDICAL HISTORY:  Past Medical History:   Diagnosis Date   • Antral gastritis    • Asthma    • Atrial fibrillation (CMS/HCC)     treated with oral blood thinner   • Chest pain    • COPD (chronic obstructive pulmonary disease) (CMS/HCC)    • Coronary artery disease     no stents   • Diabetes mellitus (CMS/HCC)     type 2    • Duodenitis    • Elevated cholesterol    • Emphysema of lung (CMS/HCC)    • Gallbladder disease     removed    • GERD (gastroesophageal reflux disease)    • Hyperlipidemia    • Hypertension    • Jaundice    • Kidney stone    • Kidney stones     had lithotripsy and passed 36 kidney stones as well as had one surgically removed.   • Malignant neoplasm of head of pancreas (CMS/HCC) 9/5/2018   • Palpitations    • Pneumonia  08/2018   • Reflux esophagitis    • Renal failure     stage 3 kidney disease   • Sepsis (CMS/HCC)        PAST SURGICAL HISTORY:  Past Surgical History:   Procedure Laterality Date   • BILE DUCT STENT PLACEMENT      [facility] 2 weeks ago    • CARDIAC CATHETERIZATION  11/01/1999   • CARDIOVASCULAR STRESS TEST  09/2012   • CHOLECYSTECTOMY N/A 8/10/2018    Procedure: CHOLECYSTECTOMY LAPAROSCOPIC;  Surgeon: Roank Downs MD;  Location: [facility] OR;  Service: General   • COLONOSCOPY     • CYSTOSCOPY BLADDER STONE LITHOTRIPSY     • ECHO - CONVERTED  09/2012   • ERCP N/A 8/14/2018    Procedure: ENDOSCOPIC RETROGRADE CHOLANGIOPANCREATOGRAPHY WITH PAPILLOTOMY;  Surgeon: Scot Su MD;  Location: [facility] OR;  Service: Gastroenterology   • ERCP N/A 10/1/2018    Procedure: ENDOSCOPIC RETROGRADE CHOLANGIOPANCREATOGRAPHY;  Surgeon: Jefry Luna MD;  Location: [facility] ENDOSCOPY;  Service: Gastroenterology   • KIDNEY STONE SURGERY     • KIDNEY STONE SURGERY      open abdominal surgery   • PORTACATH PLACEMENT Right 10/23/2018    Procedure: INSERTION OF PORTACATH;  Surgeon: Ronak Downs MD;  Location: [facility] OR;  Service: General   • UPPER GASTROINTESTINAL ENDOSCOPY  08/30/2012       FAMILY HISTORY:  Family History   Problem Relation Age of Onset   • Heart attack Mother    • Heart disease Mother    • Stroke Mother    • Kidney disease Mother    • Heart failure Mother    • Lung disease Father    • Tuberculosis Father    • Diabetes Sister    • Heart attack Brother    • Heart failure Brother    • Heart disease Brother    • Diabetes Sister    • No Known Problems Brother    • No Known Problems Brother        SOCIAL HISTORY:  Social History     Social History   • Marital status:      Spouse name: N/A   • Number of children: N/A   • Years of education: N/A     Occupational History   • Not on file.     Social History Main Topics   • Smoking status: Never Smoker   • Smokeless tobacco: Never  Used   • Alcohol use No   • Drug use: No   • Sexual activity: Defer     Other Topics Concern   • Not on file     Social History Narrative    He is  and lives alone with his wife although they have 3 children together who all live close by. He is retired from the [employer] and was exposed to Agent Orange during [place]. He is a lifelong nonsmoker.         A DIL  of cancer.      REVIEW OF SYSTEMS:   A comprehensive 14 point review of systems was performed.  Significant findings as mentioned above.  All other systems reviewed and are negative.      MEDICATIONS:  The current medication list was reviewed in the EMR    Current Outpatient Prescriptions:   •  albuterol (PROVENTIL HFA;VENTOLIN HFA) 108 (90 BASE) MCG/ACT inhaler, Inhale 2 puffs Every 4 (Four) Hours As Needed for Wheezing or Shortness of Air., Disp: , Rfl:   •  apixaban (ELIQUIS) 5 MG tablet tablet, Take 1 tablet by mouth Every 12 (Twelve) Hours., Disp: 60 tablet, Rfl: 0  •  cholecalciferol (VITAMIN D3) 1000 units tablet, Take 1,000 Units by mouth Daily., Disp: , Rfl:   •  colchicine 0.6 MG tablet, Take 1 tablet by mouth Daily., Disp: 90 tablet, Rfl: 2  •  dronabinol (MARINOL) 5 MG capsule, Take 1 capsule by mouth 2 (Two) Times a Day Before Meals., Disp: 60 capsule, Rfl: 0  •  flecainide (TAMBOCOR) 50 MG tablet, Take 1 tablet by mouth Every 12 (Twelve) Hours., Disp: 60 tablet, Rfl: 0  •  FLUoxetine (PROzac) 20 MG capsule, Take 20 mg by mouth Every Night., Disp: , Rfl:   •  lansoprazole (PREVACID) 30 MG capsule, Take 30 mg by mouth Daily., Disp: , Rfl:   •  lidocaine-prilocaine (EMLA) 2.5-2.5 % cream, Apply topically to the appropriate area as directed Every 2 (Two) Hours As Needed for Mild Pain ., Disp: 30 g, Rfl: 5  •  linaclotide (LINZESS) 145 MCG capsule capsule, Take 1 capsule by mouth Every Morning Before Breakfast., Disp: 30 capsule, Rfl: 3  •  losartan (COZAAR) 100 MG tablet, Take 1 tablet by mouth Daily. (Patient taking differently: Take 25  mg by mouth Daily.), Disp: 90 tablet, Rfl: 3  •  metoprolol tartrate (LOPRESSOR) 50 MG tablet, Take 25 mg by mouth As Needed., Disp: , Rfl:   •  mometasone (ASMANEX) 220 MCG/INH inhaler, Inhale 2 puffs Every Night., Disp: , Rfl:   •  montelukast (SINGULAIR) 10 MG tablet, Take 10 mg by mouth Daily., Disp: , Rfl:   •  Morphine (MS CONTIN) 30 MG 12 hr tablet, Take 1 tablet by mouth Every 12 (Twelve) Hours., Disp: 60 tablet, Rfl: 0  •  nitroglycerin (NITROSTAT) 0.4 MG SL tablet, Place 0.4 mg under the tongue as needed for chest pain., Disp: , Rfl:   •  ondansetron (ZOFRAN) 8 MG tablet, Take 1 tablet by mouth Every 8 (Eight) Hours As Needed for Nausea or Vomiting., Disp: 90 tablet, Rfl: 6  •  oxyCODONE (ROXICODONE) 5 MG immediate release tablet, Take 1-3 tabs every 4 hours as needed for pain, Disp: 200 tablet, Rfl: 0  •  pioglitazone (ACTOS) 30 MG tablet, Take 30 mg by mouth Daily., Disp: , Rfl:   •  polyethylene glycol (MIRALAX) packet, Take 17 g by mouth Daily As Needed., Disp: , Rfl:   •  prochlorperazine (COMPAZINE) 10 MG tablet, Take 1 tablet by mouth Every 6 (Six) Hours As Needed for Nausea or Vomiting., Disp: 60 tablet, Rfl: 5  •  sore muscle (ICY HOT EXTRA STRENGTH) 10-30 % cream cream, Apply 1 application topically to the appropriate area as directed 3 (Three) Times a Day As Needed (local pain)., Disp: , Rfl:   •  Tiotropium Bromide-Olodaterol 2.5-2.5 MCG/ACT aerosol solution, Inhale 2 puffs Daily., Disp: , Rfl:   No current facility-administered medications for this visit.     Facility-Administered Medications Ordered in Other Visits:   •  sodium chloride 0.9 % infusion  - ADS Override Pull, , , ,   •  sodium chloride 0.9 % infusion  - ADS Override Pull, , , ,     ALLERGIES:    Allergies   Allergen Reactions   • Contrast Dye Other (See Comments)     Can't have due to kidney failure per family       PHYSICAL EXAM:  Vitals:    11/06/18 1011   BP: 115/73   Pulse: 92   Resp: 18   Temp: 98.4 °F (36.9 °C)   SpO2: 98%    General:  Awake, alert and oriented. Appears fatigued, in pain.  Holding emesis basin.  HEENT:  Pupils are equal, round and reactive to light and accommodation, Extra-ocular movements full, Oropharyx clear, mucous membranes dry  Neck:  No JVD, thyromegaly or lymphadenopathy  CV:  Regular rate and rhythm, no murmurs, rubs or gallops  Resp:  Lungs are clear to auscultation bilaterally  Abd:  Soft, somewhat tender to palpation bi over the epigastric and LUQ areas, non-distended, bowel sounds normal, no organomegaly or masses.  Surgical incisions well-healed.  Ext:  No clubbing, cyanosis or edema  Lymph:  No cervical, supraclavicular, axillary,adenopathy  Neuro: grossly non-focal exam    PATHOLOGY:  08-15-18         ENDOSCOPY:  18 ERCP with papillotomy, balloon rotation of the biliary stricture, pathology brushings, ileum stent placement (Georges)  1.  Irregular 3 cm distal common bile duct stricture concerning for neoplastic disease. Biliary papillotomy, stricture dilatation by  through the scope balloon, cytology brushings performed and 10 Mexican by 5 cm biliary stent placed.    2.  Dilated common hepatic duct to 15 mm when fully contrast filled.  Dilated intrahepatic biliary tree.    3.  No stones proximal to the stricture were identified.    4.  Slight dilatation of the distal pancreatic duct without a discrete stricture identified.    IMAGIN18 CT Abdomen Pelvis Stone Protocol   Findings  - LOWER THORAX: Clear. No effusions.  - LIVER: Homogeneous. No focal hepatic mass or ductal dilatation.  - GALLBLADDER: MINIMAL STRANDING AROUND REGION OF GALLBLADDER FOSSA  THAT MAY BE POSTSURGICAL.  - PANCREAS: Unremarkable. No mass or ductal dilatation.  - SPLEEN: Homogeneous. No splenomegaly.  - ADRENALS: No mass.  - KIDNEYS: No mass. No obstructive uropathy.  No evidence of urolithiasis.  - GI TRACT: SMALL HIATAL HERNIA.  - PERITONEUM: No free air. No free fluid or loculated fluid collections.  - MESENTERY:  Unremarkable.  - LYMPH NODES: No lymphadenopathy.  - VASCULATURE: ATHEROSCLEROTIC VASCULAR CALCIFICATION.  - ABDOMINAL WALL: No focal hernia or mass.  - OTHER: None.  - BLADDER: No focal mass or significant wall thickening  - REPRODUCTIVE: Unremarkable as visualized.  - APPENDIX: Nondistended. No surrounding inflammation.  - BONES: No acute bony abnormality.     IMPRESSION:  - Minimal stranding around region of gallbladder fossa that may be postsurgical.     08-13-18 US Liver   FINDINGS:   - Visualized pancreas is unremarkable.   - The gallbladder is absent. No collection identified.   - The CBD measures 10.34426160982 mm    .  - The liver demonstrates normal echogenicity without focal lesion.    - No ascites demonstrated.        IMPRESSION:  - As above.    08-22-18 CT Abdomen Pelvis Stone Protocol   FINDINGS:   - Minimal bibasilar atelectasis.  - Hiatal hernia.  - The liver is homogeneous. There is no evidence of focal hepatic mass.  - The spleen is homogeneous.  - Stent is noted traversing the common bile duct.  - 3 mm nodule in the right lung on image 8 of the axial series.  - There is no adrenal enlargement.  - The kidneys show no evidence of hydronephrosis or hydroureter. I do not see any distal ureteral stones.   - Otherwise I do not see any free fluid or walled off fluid collections.  - There is moderate to large volume stool in the colon.  - There is no evidence of mesenteric or retroperitoneal adenopathy.     IMPRESSION:  1. Tiny nodule in the right lung base.  2. Minimal bibasilar atelectasis.  3. Moderate to large volume stool in the colon.     Other findings as above.    08-22-18 CT Abdomen Pelvis With Contrast   FINDINGS:   - Tiny left basilar nodule measuring 4 mm on image 14 of the axial series.  - Minimal bibasilar atelectasis.  - The liver is homogeneous. There is no evidence of focal hepatic mass  - The spleen is homogeneous  - Indistinct appearance of the pancreatic head. A biliary stent is  present.  - Small left adrenal nodule is present.  - The kidneys show no evidence of hydronephrosis or hydroureter. I do not see any distal ureteral stones.   - Otherwise I do not see any free fluid or walled off fluid collections.  - Large volume stool is present in the colon.     IMPRESSION:  1. Slightly enlarged, indistinct appearance of the pancreatic head.  - Underlying neoplasm certainly not excluded but no delineable mass is present. There is a biliary stent.    2. Tiny nodule in the left lung base.    3. Small left adrenal nodule.    4. Hiatal hernia.    5. Moderate to large volume stool in the colon.      09-11-18 CT Chest Without Contrast   FINDINGS:   - Minimal coronary artery calcifications present.  - No pericardial or pleural effusions.  - No parenchymal soft tissue nodules or masses. There are findings of COPD.     IMPRESSION:  - COPD.  - Hiatal hernia.  - Minimal coronary artery calcification.       09-11-18 CT Abdomen Pelvis Without Contrast   FINDINGS:   - Lung bases show mild increased interstitial markings.  - There is a hiatal hernia.  - The liver is homogeneous. There is no evidence of focal hepatic mass.  - The spleen is homogeneous.  - Mildly indistinct appearance of the pancreas. There is a biliary stent present. I cannot exclude mild degree of pancreatitis..  - There is no adrenal enlargement.  - The kidneys show no evidence of hydronephrosis or hydroureter. I do not see any distal ureteral stones.   - Otherwise I do not see any free fluid or walled off fluid collections.  - Large volume stool is present in the colon.  - Urinary bladder wall is slightly thickened.  - There is no evidence of mesenteric or retroperitoneal adenopathy.    IMPRESSION:  1. Mildly indistinct appearance of the proximal pancreas.  2. Urinary bladder wall thickening.  3. Large volume stool in the colon.    09-14-18 CT Abdomen Without Contrast   FINDINGS:   - Again noted is very mild indistinct appearance of the  pancreatic head. Stent is stable in position.  - The liver is stable and homogeneous.  - Spleen is homogeneous.  - No adrenal enlargement.  - Moderate to large volume stool in the colon.     IMPRESSION:  - No significant interval change since the previous exam.     09-21-18 NM Pet Skull Base To Mid Thigh   FINDINGS:      HEAD/NECK:  - No FDG hypermetabolic neck adenopathy.  - No FDG hypermetabolic masses.     CHEST:   - No FDG hypermetabolic thoracic adenopathy.  - No FDG hypermetabolic lung nodules or masses.  - Evaluation for tiny parenchymal nodules is somewhat limited on low dose CT secondary to respiratory motion.     ABDOMEN/PELVIS:   - There is hypermetabolic activity in the pancreatic head with a maximum SUV of 4.971.  - Physiologic FDG hypermetabolism seen throughout the GI tract.  - Physiologic FDG hypermetabolism seen throughout the mesentery, retroperitoneum and pelvis.     BONES:   - There are scattered foci of FDG hypermetabolism most suggestive of degenerative change seen throughout the axial skeleton. If concern persist for metastatic lesions of the bone, HDP Bone scan is recommended for further evaluation.     IMPRESSION:  - Abnormal hypermetabolic activity corresponding to area of indistinctness in the pancreatic head. Maximum SUV is 4.97.    RECENT LABS:  Lab Results   Component Value Date    WBC 5.12 11/06/2018    HGB 10.8 (L) 11/06/2018    HCT 32.4 (L) 11/06/2018    MCV 88.3 11/06/2018    RDW 13.3 11/06/2018     11/06/2018    NEUTRORELPCT 66.1 11/06/2018    LYMPHORELPCT 18.8 11/06/2018    MONORELPCT 12.9 (H) 11/06/2018    EOSRELPCT 1.2 11/06/2018    BASORELPCT 0.2 11/06/2018    NEUTROABS 3.39 11/06/2018    LYMPHSABS 0.96 (L) 11/06/2018       Lab Results   Component Value Date     11/06/2018    K 3.9 11/06/2018    CO2 26.4 11/06/2018     11/06/2018    BUN 13 11/06/2018    CREATININE 1.15 11/06/2018    EGFRIFNONA 63 11/06/2018    EGFRIFAFRI  09/02/2016      Comment:      <15  Indicative of kidney failure.    GLUCOSE 132 (H) 11/06/2018    CALCIUM 9.3 11/06/2018    ALKPHOS 109 11/06/2018    AST 25 11/06/2018    ALT 21 11/06/2018    BILITOT 0.6 11/06/2018    ALBUMIN 3.90 11/06/2018    PROTEINTOT 6.9 11/06/2018    MG 1.7 11/01/2018       Lab Results   Component Value Date/Time    URICACID 7.5 (H) 10/16/2018 12:18 PM     Lab Results   Component Value Date     5149 (H) 10/19/2018     7602 (H) 09/25/2018     3636 (H) 09/07/2018     4032 (H) 08/15/2018         ASSESSMENT & PLAN:  Todd Phillips is a very pleasant 68 y.o. male with presumed cancer of the head of the pancreas with a malignant appearing stricture in the CBD.    1.  Pancreatic cancer:  -  Imaging shows vague abnormality in the head of the pancreas which is hypermetabolic on PET-CT.  Biopsy was c/w pancreatic cancer and Ca19-9 is markedly elevated.    -  Biliary stent was exchanged for metal stent to relieve obstruction / cholangitis.   I have recommended neoadjuvant chemotherapy with Gemcitabine and Abraxane and he started treatment.  -  Will increase supportive care as below and continue with D8 treatment today (delayed 1 week).    2.  Nausea/Vomiting / Dehydration;  -  Will give 1L NS with treatment today and continue 1L NS daily with HH for the time being.  -  Will see if see if we can add Emend to his treatment regimen and will d/c zofran and start Sancuso patch. Continue Marinol 5 mg BID.  Continue Compazine as needed.  Will add Ativan 0.5-1 mg q4-6h prn breakthough nausea.    3.  Neoplasm related pain:  -  May be worse today b/c he may have missed Morphine yesterday evening.  It seems he isn't really taking Oxycodone as much / as frequently as he needs it.  Will give Dilaudid 1 mg IV today and continue Morphine 30 mg q12h.  Encouraged to liberalize Oxycodone use if needed (and discussed strategy for this with he and his son today).  -  Could consider referral for block if needed.    4. Constipation: Continue  Senna/ Colace ii BID with Mirallax as needed.  Bowels now moving well.      5.   History of Atrial fibrillation:  Chronically anticoagulated on Eliquis.     6.  Prophylaxis:  Has had 2018 influenza vaccine.      7.  ACO Quality measures  - Todd Phillips does not use tobacco products.  - Patient's Body mass index is 28.07 kg/m². BMI is above normal parameters. Recommendations include: none given current problems.  Will address in the future..  - Current outpatient and discharge medications have been reconciled for the patient.  Reviewed by:Gwen Alves MD     This note was scribed for Gwen Alves MD by Tamara Morejon RN.    I, Gwen Alves MD, personally performed the services described in this documentation as scribed by the above named individual in my presence, and it is both accurate and complete.  11/06/2018      I spent 35 minutes with Todd Phillips today.  More than 50% of the time was spent in counseling / coordination of care for the above problems.      Electronically Signed by: VIMAL Rolle      CC:   MD Miquel Quintana MD Aaron House, MD Michael Simons, MD

## 2018-11-07 ENCOUNTER — DOCUMENTATION (OUTPATIENT)
Dept: ONCOLOGY | Facility: HOSPITAL | Age: 68
End: 2018-11-07

## 2018-11-07 NOTE — PROGRESS NOTES
SS received consult per Dr. Alves to continue IV fluids therapy.  Pt not to take Zofran while using the sancuso patches for nausea.      SS contacted [facility] [phone] per John to refill IV fluids for the next five days.   IV fluids to be delivered to pt's home today.  SS spoke with ROHIT Sawyer to make aware.  SS will follow.

## 2018-11-13 ENCOUNTER — INFUSION (OUTPATIENT)
Dept: ONCOLOGY | Facility: HOSPITAL | Age: 68
End: 2018-11-13
Attending: INTERNAL MEDICINE

## 2018-11-13 ENCOUNTER — LAB (OUTPATIENT)
Dept: ONCOLOGY | Facility: CLINIC | Age: 68
End: 2018-11-13

## 2018-11-13 ENCOUNTER — DOCUMENTATION (OUTPATIENT)
Dept: ONCOLOGY | Facility: HOSPITAL | Age: 68
End: 2018-11-13

## 2018-11-13 ENCOUNTER — OFFICE VISIT (OUTPATIENT)
Dept: ONCOLOGY | Facility: CLINIC | Age: 68
End: 2018-11-13

## 2018-11-13 VITALS
BODY MASS INDEX: 28.18 KG/M2 | SYSTOLIC BLOOD PRESSURE: 92 MMHG | HEART RATE: 105 BPM | OXYGEN SATURATION: 97 % | TEMPERATURE: 96.6 F | WEIGHT: 207.8 LBS | DIASTOLIC BLOOD PRESSURE: 62 MMHG | RESPIRATION RATE: 18 BRPM

## 2018-11-13 VITALS
WEIGHT: 207.8 LBS | DIASTOLIC BLOOD PRESSURE: 62 MMHG | OXYGEN SATURATION: 97 % | TEMPERATURE: 96.6 F | HEART RATE: 105 BPM | RESPIRATION RATE: 18 BRPM | BODY MASS INDEX: 28.18 KG/M2 | SYSTOLIC BLOOD PRESSURE: 92 MMHG

## 2018-11-13 DIAGNOSIS — R53.83 FATIGUE, UNSPECIFIED TYPE: ICD-10-CM

## 2018-11-13 DIAGNOSIS — R63.0 POOR APPETITE: ICD-10-CM

## 2018-11-13 DIAGNOSIS — N18.30 CKD (CHRONIC KIDNEY DISEASE), STAGE III (HCC): ICD-10-CM

## 2018-11-13 DIAGNOSIS — R97.8 ELEVATED CA 19-9 LEVEL: ICD-10-CM

## 2018-11-13 DIAGNOSIS — C25.0 MALIGNANT NEOPLASM OF HEAD OF PANCREAS (HCC): Primary | ICD-10-CM

## 2018-11-13 DIAGNOSIS — G89.3 NEOPLASM RELATED PAIN: ICD-10-CM

## 2018-11-13 DIAGNOSIS — C25.0 MALIGNANT NEOPLASM OF HEAD OF PANCREAS (HCC): ICD-10-CM

## 2018-11-13 DIAGNOSIS — R78.81 BACTEREMIA: ICD-10-CM

## 2018-11-13 DIAGNOSIS — R11.0 NAUSEA: ICD-10-CM

## 2018-11-13 DIAGNOSIS — A41.9 SEPSIS, DUE TO UNSPECIFIED ORGANISM: ICD-10-CM

## 2018-11-13 LAB
ALBUMIN SERPL-MCNC: 3.6 G/DL (ref 3.4–4.8)
ALBUMIN/GLOB SERPL: 1.4 G/DL (ref 1.5–2.5)
ALP SERPL-CCNC: 94 U/L (ref 40–129)
ALT SERPL W P-5'-P-CCNC: 32 U/L (ref 10–44)
ANION GAP SERPL CALCULATED.3IONS-SCNC: 7.5 MMOL/L (ref 3.6–11.2)
AST SERPL-CCNC: 28 U/L (ref 10–34)
BILIRUB SERPL-MCNC: 0.3 MG/DL (ref 0.2–1.8)
BUN BLD-MCNC: 20 MG/DL (ref 7–21)
BUN/CREAT SERPL: 17.1 (ref 7–25)
CALCIUM SPEC-SCNC: 8.9 MG/DL (ref 7.7–10)
CHLORIDE SERPL-SCNC: 102 MMOL/L (ref 99–112)
CO2 SERPL-SCNC: 27.5 MMOL/L (ref 24.3–31.9)
CREAT BLD-MCNC: 1.17 MG/DL (ref 0.43–1.29)
CRP SERPL-MCNC: <0.5 MG/DL (ref 0–0.99)
DEPRECATED RDW RBC AUTO: 42.7 FL (ref 37–54)
ERYTHROCYTE [DISTWIDTH] IN BLOOD BY AUTOMATED COUNT: 13.4 % (ref 11.5–14.5)
GFR SERPL CREATININE-BSD FRML MDRD: 62 ML/MIN/1.73
GLOBULIN UR ELPH-MCNC: 2.5 GM/DL
GLUCOSE BLD-MCNC: 162 MG/DL (ref 70–110)
HCT VFR BLD AUTO: 31.2 % (ref 42–52)
HGB BLD-MCNC: 10.4 G/DL (ref 14–18)
LYMPHOCYTES # BLD MANUAL: 1.38 10*3/MM3 (ref 1–3)
LYMPHOCYTES NFR BLD MANUAL: 14 % (ref 0–12)
LYMPHOCYTES NFR BLD MANUAL: 32 % (ref 16–46)
MCH RBC QN AUTO: 29.1 PG (ref 27–33)
MCHC RBC AUTO-ENTMCNC: 33.3 G/DL (ref 33–37)
MCV RBC AUTO: 87.4 FL (ref 80–94)
METAMYELOCYTES NFR BLD MANUAL: 1 % (ref 0–0)
MONOCYTES # BLD AUTO: 0.6 10*3/MM3 (ref 0.1–0.9)
NEUTROPHILS # BLD AUTO: 2.28 10*3/MM3 (ref 1.4–6.5)
NEUTROPHILS NFR BLD MANUAL: 52 % (ref 40–75)
NEUTS BAND NFR BLD MANUAL: 1 % (ref 0–8)
OSMOLALITY SERPL CALC.SUM OF ELEC: 280 MOSM/KG (ref 273–305)
PLAT MORPH BLD: NORMAL
PLATELET # BLD AUTO: 257 10*3/MM3 (ref 130–400)
PMV BLD AUTO: 9.2 FL (ref 6–10)
POTASSIUM BLD-SCNC: 3.5 MMOL/L (ref 3.5–5.3)
PROT SERPL-MCNC: 6.1 G/DL (ref 6–8)
RBC # BLD AUTO: 3.57 10*6/MM3 (ref 4.7–6.1)
RBC MORPH BLD: NORMAL
SCAN SLIDE: NORMAL
SODIUM BLD-SCNC: 137 MMOL/L (ref 135–153)
WBC NRBC COR # BLD: 4.31 10*3/MM3 (ref 4.5–12.5)

## 2018-11-13 PROCEDURE — 86140 C-REACTIVE PROTEIN: CPT | Performed by: INTERNAL MEDICINE

## 2018-11-13 PROCEDURE — 25010000002 PACLITAXEL PROTEIN-BOUND PART PER 1 MG: Performed by: INTERNAL MEDICINE

## 2018-11-13 PROCEDURE — 96375 TX/PRO/DX INJ NEW DRUG ADDON: CPT

## 2018-11-13 PROCEDURE — 25010000002 FOSAPREPITANT PER 1 MG: Performed by: INTERNAL MEDICINE

## 2018-11-13 PROCEDURE — 25010000002 DEXAMETHASONE SODIUM PHOSPHATE 20 MG/5ML SOLUTION 5 ML VIAL: Performed by: INTERNAL MEDICINE

## 2018-11-13 PROCEDURE — 96367 TX/PROPH/DG ADDL SEQ IV INF: CPT

## 2018-11-13 PROCEDURE — 96413 CHEMO IV INFUSION 1 HR: CPT

## 2018-11-13 PROCEDURE — 85007 BL SMEAR W/DIFF WBC COUNT: CPT | Performed by: INTERNAL MEDICINE

## 2018-11-13 PROCEDURE — 86301 IMMUNOASSAY TUMOR CA 19-9: CPT | Performed by: INTERNAL MEDICINE

## 2018-11-13 PROCEDURE — 96417 CHEMO IV INFUS EACH ADDL SEQ: CPT

## 2018-11-13 PROCEDURE — 80053 COMPREHEN METABOLIC PANEL: CPT | Performed by: INTERNAL MEDICINE

## 2018-11-13 PROCEDURE — 85025 COMPLETE CBC W/AUTO DIFF WBC: CPT | Performed by: INTERNAL MEDICINE

## 2018-11-13 PROCEDURE — 25010000002 GEMCITABINE HCL 2 GM/20ML SOLUTION 20 ML VIAL: Performed by: INTERNAL MEDICINE

## 2018-11-13 PROCEDURE — 99214 OFFICE O/P EST MOD 30 MIN: CPT | Performed by: INTERNAL MEDICINE

## 2018-11-13 RX ORDER — SODIUM CHLORIDE 0.9 % (FLUSH) 0.9 %
10 SYRINGE (ML) INJECTION AS NEEDED
Status: DISCONTINUED | OUTPATIENT
Start: 2018-11-13 | End: 2018-11-13 | Stop reason: HOSPADM

## 2018-11-13 RX ORDER — SODIUM CHLORIDE 0.9 % (FLUSH) 0.9 %
10 SYRINGE (ML) INJECTION AS NEEDED
Status: CANCELLED | OUTPATIENT
Start: 2018-11-13

## 2018-11-13 RX ORDER — PACLITAXEL 100 MG/20ML
270 INJECTION, POWDER, LYOPHILIZED, FOR SUSPENSION INTRAVENOUS ONCE
Status: COMPLETED | OUTPATIENT
Start: 2018-11-13 | End: 2018-11-13

## 2018-11-13 RX ORDER — FENTANYL 25 UG/H
1 PATCH TRANSDERMAL
Qty: 10 EACH | Refills: 0 | Status: SHIPPED | OUTPATIENT
Start: 2018-11-13 | End: 2018-11-18 | Stop reason: HOSPADM

## 2018-11-13 RX ORDER — SODIUM CHLORIDE 9 MG/ML
250 INJECTION, SOLUTION INTRAVENOUS ONCE
Status: COMPLETED | OUTPATIENT
Start: 2018-11-13 | End: 2018-11-13

## 2018-11-13 RX ADMIN — GEMCITABINE 2150 MG: 100 INJECTION, SOLUTION INTRAVENOUS at 12:00

## 2018-11-13 RX ADMIN — PACLITAXEL 270 MG: 100 INJECTION, POWDER, LYOPHILIZED, FOR SUSPENSION INTRAVENOUS at 11:25

## 2018-11-13 RX ADMIN — SODIUM CHLORIDE 250 ML: 9 INJECTION, SOLUTION INTRAVENOUS at 10:35

## 2018-11-13 RX ADMIN — DEXAMETHASONE SODIUM PHOSPHATE 12 MG: 4 INJECTION, SOLUTION INTRAMUSCULAR; INTRAVENOUS at 10:35

## 2018-11-13 RX ADMIN — Medication 10 ML: at 13:05

## 2018-11-13 RX ADMIN — SODIUM CHLORIDE 150 MG: 9 INJECTION, SOLUTION INTRAVENOUS at 10:51

## 2018-11-13 RX ADMIN — HEPARIN SODIUM (PORCINE) LOCK FLUSH IV SOLN 100 UNIT/ML 500 UNITS: 100 SOLUTION at 13:05

## 2018-11-13 NOTE — PROGRESS NOTES
DATE : 11/13/18    DIAGNOSIS:   1.  Pancreatic cancer    2.  Repeated episodes of bacteremia - Klebsiella pneumoniae in August 2018, ESBL E coli in September 2018    CHIEF COMPLAINT:  Abdominal pain, Nausea    TREATMENT:  1.       HISTORY OF PRESENT ILLNESS:   Todd Phillips is a very pleasant 68 y.o. male who is being seen today at the request of Dr. Miquel Brody for evaluation and treatment of pancreatic cancer.  Mr. Phillips initially developed symptoms in January of 2018. At that time he had pain in his upper abdomen which would radiate to his back.  In July, the pain intensified.  He was seen in the ER and also by Dr. Su.  He underwent RUQ U/S and then HIDA scan and it was determined he had a dysfunctional gallbladder.  He saw Dr. Downs and had cholecystectomy on 8-10-18.  Pathology showed chronic cholecystitis and an incidental benign lymph node.  He re-presented in the post-operative period with jaundice.  Dr. Su performed ERCP on8-14-18.  He was noted to have a 3 cm irregular tight stricture of the distal common bile duct with proximal biliary ductal dilatation.  Biliary papillotomy and stricture dilatation performed and brushings taken.  A 10 Fr x 5 cm biliary stent was placed.  Brushings were taken but were negative.  There was sl dilatation of the distal pancreatic duct without discrete stricture.   He was discharged with improving jaundice on 8-16.  On 8-21 he represented with sepsis due to Klebsiella pneumonia bacteremia and was admitted.  He then was referred to Dr. Brody for evaluation and treatment.  He has been set up for EUS with biopsy next Friday for what appears to be a primary pancreatic malignancy in the head of the pancreas.  Dr. Brody has referred him for consideration of neoadjuvant therapy.    INTERVAL HISTORY:  Mr. Phillips is here today with his wife for follow up of pancreatic cancer and C1D15 of Gemzar/Abraxane. Since his last visit, his wife says his appetite is slightly  better with Marinol 5 mg, he has been able to walk to the bathroom independently without a walker and his nausea is still present, but improved with Sancuso patch and addition of Emend.  He does still have significant breakthough nausea however.    PAST MEDICAL HISTORY:  Past Medical History:   Diagnosis Date   • Antral gastritis    • Asthma    • Atrial fibrillation (CMS/HCC)     treated with oral blood thinner   • Chest pain    • COPD (chronic obstructive pulmonary disease) (CMS/[facility])    • Coronary artery disease     no stents   • Diabetes mellitus (CMS/[facility])     type 2    • Duodenitis    • Elevated cholesterol    • Emphysema of lung (CMS/[facility])    • Gallbladder disease     removed    • GERD (gastroesophageal reflux disease)    • Hyperlipidemia    • Hypertension    • Jaundice    • Kidney stone    • Kidney stones     had lithotripsy and passed 36 kidney stones as well as had one surgically removed.   • Malignant neoplasm of head of pancreas (CMS/[facility]) 9/5/2018   • Palpitations    • Pneumonia 08/2018   • Reflux esophagitis    • Renal failure     stage 3 kidney disease   • Sepsis (CMS/[facility])        PAST SURGICAL HISTORY:  Past Surgical History:   Procedure Laterality Date   • BILE DUCT STENT PLACEMENT      [facility] 2 weeks ago    • CARDIAC CATHETERIZATION  11/01/1999   • CARDIOVASCULAR STRESS TEST  09/2012   • COLONOSCOPY     • CYSTOSCOPY BLADDER STONE LITHOTRIPSY     • ECHO - CONVERTED  09/2012   • KIDNEY STONE SURGERY     • KIDNEY STONE SURGERY      open abdominal surgery   • UPPER GASTROINTESTINAL ENDOSCOPY  08/30/2012       FAMILY HISTORY:  Family History   Problem Relation Age of Onset   • Heart attack Mother    • Heart disease Mother    • Stroke Mother    • Kidney disease Mother    • Heart failure Mother    • Lung disease Father    • Tuberculosis Father    • Diabetes Sister    • Heart attack Brother    • Heart failure Brother    • Heart disease Brother    • Diabetes Sister    • No Known Problems  Brother    • No Known Problems Brother        SOCIAL HISTORY:  Social History     Socioeconomic History   • Marital status:      Spouse name: Not on file   • Number of children: Not on file   • Years of education: Not on file   • Highest education level: Not on file   Social Needs   • Financial resource strain: Not on file   • Food insecurity - worry: Not on file   • Food insecurity - inability: Not on file   • Transportation needs - medical: Not on file   • Transportation needs - non-medical: Not on file   Occupational History   • Not on file   Tobacco Use   • Smoking status: Never Smoker   • Smokeless tobacco: Never Used   Substance and Sexual Activity   • Alcohol use: No   • Drug use: No   • Sexual activity: Defer     Partners: Female   Other Topics Concern   • Not on file   Social History Narrative    He is  and lives alone with his wife although they have 3 children together who all live close by. He is retired from the [employer] and was exposed to Agent Orange during [place]. He is a lifelong nonsmoker.         A DIL  of cancer.      REVIEW OF SYSTEMS:   A comprehensive 14 point review of systems was performed.  Significant findings as mentioned above.  All other systems reviewed and are negative.      MEDICATIONS:  The current medication list was reviewed in the EMR    Current Outpatient Medications:   •  albuterol (PROVENTIL HFA;VENTOLIN HFA) 108 (90 BASE) MCG/ACT inhaler, Inhale 2 puffs Every 4 (Four) Hours As Needed for Wheezing or Shortness of Air., Disp: , Rfl:   •  apixaban (ELIQUIS) 5 MG tablet tablet, Take 1 tablet by mouth Every 12 (Twelve) Hours., Disp: 60 tablet, Rfl: 0  •  cholecalciferol (VITAMIN D3) 1000 units tablet, Take 1,000 Units by mouth Daily., Disp: , Rfl:   •  colchicine 0.6 MG tablet, Take 1 tablet by mouth Daily., Disp: 90 tablet, Rfl: 2  •  dronabinol (MARINOL) 5 MG capsule, Take 1 capsule by mouth 2 (Two) Times a Day Before Meals., Disp: 60 capsule, Rfl: 0  •   flecainide (TAMBOCOR) 50 MG tablet, Take 1 tablet by mouth Every 12 (Twelve) Hours., Disp: 60 tablet, Rfl: 0  •  FLUoxetine (PROzac) 20 MG capsule, Take 20 mg by mouth Every Night., Disp: , Rfl:   •  lansoprazole (PREVACID) 30 MG capsule, Take 30 mg by mouth Daily., Disp: , Rfl:   •  lidocaine-prilocaine (EMLA) 2.5-2.5 % cream, Apply topically to the appropriate area as directed Every 2 (Two) Hours As Needed for Mild Pain ., Disp: 30 g, Rfl: 5  •  linaclotide (LINZESS) 145 MCG capsule capsule, Take 1 capsule by mouth Every Morning Before Breakfast., Disp: 30 capsule, Rfl: 3  •  LORazepam (ATIVAN) 0.5 MG tablet, Take 1 tablet by mouth Every 8 (Eight) Hours As Needed (for breakthrough nausea)., Disp: 60 tablet, Rfl: 0  •  losartan (COZAAR) 100 MG tablet, Take 1 tablet by mouth Daily. (Patient taking differently: Take 25 mg by mouth Daily.), Disp: 90 tablet, Rfl: 3  •  metoprolol tartrate (LOPRESSOR) 50 MG tablet, Take 25 mg by mouth As Needed., Disp: , Rfl:   •  mometasone (ASMANEX) 220 MCG/INH inhaler, Inhale 2 puffs Every Night., Disp: , Rfl:   •  montelukast (SINGULAIR) 10 MG tablet, Take 10 mg by mouth Daily., Disp: , Rfl:   •  Morphine (MS CONTIN) 30 MG 12 hr tablet, Take 1 tablet by mouth Every 12 (Twelve) Hours., Disp: 60 tablet, Rfl: 0  •  nitroglycerin (NITROSTAT) 0.4 MG SL tablet, Place 0.4 mg under the tongue as needed for chest pain., Disp: , Rfl:   •  ondansetron (ZOFRAN) 8 MG tablet, Take 1 tablet by mouth Every 8 (Eight) Hours As Needed for Nausea or Vomiting., Disp: 90 tablet, Rfl: 6  •  oxyCODONE (ROXICODONE) 5 MG immediate release tablet, Take 1-3 tabs every 4 hours as needed for pain, Disp: 200 tablet, Rfl: 0  •  pioglitazone (ACTOS) 30 MG tablet, Take 30 mg by mouth Daily., Disp: , Rfl:   •  polyethylene glycol (MIRALAX) packet, Take 17 g by mouth Daily As Needed., Disp: , Rfl:   •  predniSONE (DELTASONE) 10 MG tablet, Take 4 tabs x 2 days, 3 tabs x 2 days, 2 tabs x 2 days, 1 tab x 2 days, then  stop, Disp: 20 tablet, Rfl: 0  •  prochlorperazine (COMPAZINE) 10 MG tablet, Take 1 tablet by mouth Every 6 (Six) Hours As Needed for Nausea or Vomiting., Disp: 60 tablet, Rfl: 5  •  sore muscle (ICY HOT EXTRA STRENGTH) 10-30 % cream cream, Apply 1 application topically to the appropriate area as directed 3 (Three) Times a Day As Needed (local pain)., Disp: , Rfl:   •  Tiotropium Bromide-Olodaterol 2.5-2.5 MCG/ACT aerosol solution, Inhale 2 puffs Daily., Disp: , Rfl:   No current facility-administered medications for this visit.     Facility-Administered Medications Ordered in Other Visits:   •  sodium chloride 0.9 % infusion  - ADS Override Pull, , , ,   •  sodium chloride 0.9 % infusion  - ADS Override Pull, , , ,     ALLERGIES:    Allergies   Allergen Reactions   • Contrast Dye Other (See Comments)     Can't have due to kidney failure per family       PHYSICAL EXAM:  Vitals:    11/13/18 0902   BP: 92/62   Pulse: 105   Resp: 18   Temp: 96.6 °F (35.9 °C)   SpO2: 97%   General:  Awake, alert and oriented. Appears fatigued but more comfortable than his last visit  HEENT:  Pupils are equal, round and reactive to light and accommodation, Extra-ocular movements full, Oropharyx clear  Neck:  No JVD, thyromegaly or lymphadenopathy  CV:  Regular tachycardia, no murmurs, rubs or gallops  Resp:  Lungs are clear to auscultation bilaterally  Abd:  Soft, somewhat tender to palpation bi over the epigastric and RUQ areas, non-distended, bowel sounds normal, no organomegaly or masses.  Surgical incisions well-healed.  Ext:  No clubbing, cyanosis or edema  Lymph:  No cervical, supraclavicular, axillary,adenopathy  Neuro: grossly non-focal exam    PATHOLOGY:  08-15-18         ENDOSCOPY:  08-14-18 ERCP with papillotomy, balloon rotation of the biliary stricture, pathology brushings, ileum stent placement (Georges)  1.  Irregular 3 cm distal common bile duct stricture concerning for neoplastic disease. Biliary papillotomy, stricture  dilatation by  through the scope balloon, cytology brushings performed and 10 Swedish by 5 cm biliary stent placed.    2.  Dilated common hepatic duct to 15 mm when fully contrast filled.  Dilated intrahepatic biliary tree.    3.  No stones proximal to the stricture were identified.    4.  Slight dilatation of the distal pancreatic duct without a discrete stricture identified.    IMAGIN18 CT Abdomen Pelvis Stone Protocol   Findings  - LOWER THORAX: Clear. No effusions.  - LIVER: Homogeneous. No focal hepatic mass or ductal dilatation.  - GALLBLADDER: MINIMAL STRANDING AROUND REGION OF GALLBLADDER FOSSA  THAT MAY BE POSTSURGICAL.  - PANCREAS: Unremarkable. No mass or ductal dilatation.  - SPLEEN: Homogeneous. No splenomegaly.  - ADRENALS: No mass.  - KIDNEYS: No mass. No obstructive uropathy.  No evidence of urolithiasis.  - GI TRACT: SMALL HIATAL HERNIA.  - PERITONEUM: No free air. No free fluid or loculated fluid collections.  - MESENTERY: Unremarkable.  - LYMPH NODES: No lymphadenopathy.  - VASCULATURE: ATHEROSCLEROTIC VASCULAR CALCIFICATION.  - ABDOMINAL WALL: No focal hernia or mass.  - OTHER: None.  - BLADDER: No focal mass or significant wall thickening  - REPRODUCTIVE: Unremarkable as visualized.  - APPENDIX: Nondistended. No surrounding inflammation.  - BONES: No acute bony abnormality.     IMPRESSION:  - Minimal stranding around region of gallbladder fossa that may be postsurgical.     18 US Liver   FINDINGS:   - Visualized pancreas is unremarkable.   - The gallbladder is absent. No collection identified.   - The CBD measures 10.69997881661 mm    .  - The liver demonstrates normal echogenicity without focal lesion.    - No ascites demonstrated.        IMPRESSION:  - As above.    18 CT Abdomen Pelvis Stone Protocol   FINDINGS:   - Minimal bibasilar atelectasis.  - Hiatal hernia.  - The liver is homogeneous. There is no evidence of focal hepatic mass.  - The spleen is homogeneous.  -  Stent is noted traversing the common bile duct.  - 3 mm nodule in the right lung on image 8 of the axial series.  - There is no adrenal enlargement.  - The kidneys show no evidence of hydronephrosis or hydroureter. I do not see any distal ureteral stones.   - Otherwise I do not see any free fluid or walled off fluid collections.  - There is moderate to large volume stool in the colon.  - There is no evidence of mesenteric or retroperitoneal adenopathy.     IMPRESSION:  1. Tiny nodule in the right lung base.  2. Minimal bibasilar atelectasis.  3. Moderate to large volume stool in the colon.     Other findings as above.    08-22-18 CT Abdomen Pelvis With Contrast   FINDINGS:   - Tiny left basilar nodule measuring 4 mm on image 14 of the axial series.  - Minimal bibasilar atelectasis.  - The liver is homogeneous. There is no evidence of focal hepatic mass  - The spleen is homogeneous  - Indistinct appearance of the pancreatic head. A biliary stent is present.  - Small left adrenal nodule is present.  - The kidneys show no evidence of hydronephrosis or hydroureter. I do not see any distal ureteral stones.   - Otherwise I do not see any free fluid or walled off fluid collections.  - Large volume stool is present in the colon.     IMPRESSION:  1. Slightly enlarged, indistinct appearance of the pancreatic head.  - Underlying neoplasm certainly not excluded but no delineable mass is present. There is a biliary stent.    2. Tiny nodule in the left lung base.    3. Small left adrenal nodule.    4. Hiatal hernia.    5. Moderate to large volume stool in the colon.      09-11-18 CT Chest Without Contrast   FINDINGS:   - Minimal coronary artery calcifications present.  - No pericardial or pleural effusions.  - No parenchymal soft tissue nodules or masses. There are findings of COPD.     IMPRESSION:  - COPD.  - Hiatal hernia.  - Minimal coronary artery calcification.       09-11-18 CT Abdomen Pelvis Without Contrast   FINDINGS:    - Lung bases show mild increased interstitial markings.  - There is a hiatal hernia.  - The liver is homogeneous. There is no evidence of focal hepatic mass.  - The spleen is homogeneous.  - Mildly indistinct appearance of the pancreas. There is a biliary stent present. I cannot exclude mild degree of pancreatitis..  - There is no adrenal enlargement.  - The kidneys show no evidence of hydronephrosis or hydroureter. I do not see any distal ureteral stones.   - Otherwise I do not see any free fluid or walled off fluid collections.  - Large volume stool is present in the colon.  - Urinary bladder wall is slightly thickened.  - There is no evidence of mesenteric or retroperitoneal adenopathy.    IMPRESSION:  1. Mildly indistinct appearance of the proximal pancreas.  2. Urinary bladder wall thickening.  3. Large volume stool in the colon.    09-14-18 CT Abdomen Without Contrast   FINDINGS:   - Again noted is very mild indistinct appearance of the pancreatic head. Stent is stable in position.  - The liver is stable and homogeneous.  - Spleen is homogeneous.  - No adrenal enlargement.  - Moderate to large volume stool in the colon.     IMPRESSION:  - No significant interval change since the previous exam.     09-21-18 NM Pet Skull Base To Mid Thigh   FINDINGS:      HEAD/NECK:  - No FDG hypermetabolic neck adenopathy.  - No FDG hypermetabolic masses.     CHEST:   - No FDG hypermetabolic thoracic adenopathy.  - No FDG hypermetabolic lung nodules or masses.  - Evaluation for tiny parenchymal nodules is somewhat limited on low dose CT secondary to respiratory motion.     ABDOMEN/PELVIS:   - There is hypermetabolic activity in the pancreatic head with a maximum SUV of 4.971.  - Physiologic FDG hypermetabolism seen throughout the GI tract.  - Physiologic FDG hypermetabolism seen throughout the mesentery, retroperitoneum and pelvis.     BONES:   - There are scattered foci of FDG hypermetabolism most suggestive of degenerative  change seen throughout the axial skeleton. If concern persist for metastatic lesions of the bone, HDP Bone scan is recommended for further evaluation.     IMPRESSION:  - Abnormal hypermetabolic activity corresponding to area of indistinctness in the pancreatic head. Maximum SUV is 4.97.    RECENT LABS:  Lab Results   Component Value Date    WBC 5.12 11/06/2018    HGB 10.8 (L) 11/06/2018    HCT 32.4 (L) 11/06/2018    MCV 88.3 11/06/2018    RDW 13.3 11/06/2018     11/06/2018    NEUTRORELPCT 66.1 11/06/2018    LYMPHORELPCT 18.8 11/06/2018    MONORELPCT 12.9 (H) 11/06/2018    EOSRELPCT 1.2 11/06/2018    BASORELPCT 0.2 11/06/2018    NEUTROABS 3.39 11/06/2018    LYMPHSABS 0.96 (L) 11/06/2018       Lab Results   Component Value Date     11/06/2018    K 3.9 11/06/2018    CO2 26.4 11/06/2018     11/06/2018    BUN 13 11/06/2018    CREATININE 1.15 11/06/2018    EGFRIFNONA 63 11/06/2018    EGFRIFAFRI  09/02/2016      Comment:      <15 Indicative of kidney failure.    GLUCOSE 132 (H) 11/06/2018    CALCIUM 9.3 11/06/2018    ALKPHOS 109 11/06/2018    AST 25 11/06/2018    ALT 21 11/06/2018    BILITOT 0.6 11/06/2018    ALBUMIN 3.90 11/06/2018    PROTEINTOT 6.9 11/06/2018    MG 1.7 11/01/2018       Lab Results   Component Value Date/Time    URICACID 7.5 (H) 10/16/2018 12:18 PM     Lab Results   Component Value Date     5149 (H) 10/19/2018     7602 (H) 09/25/2018     3636 (H) 09/07/2018     4032 (H) 08/15/2018         ASSESSMENT & PLAN:  Todd Phillips is a very pleasant 68 y.o. male with presumed cancer of the head of the pancreas with a malignant appearing stricture in the CBD.    1.  Pancreatic cancer:  -  Imaging shows vague abnormality in the head of the pancreas which is hypermetabolic on PET-CT.  Biopsy was c/w pancreatic cancer and Ca19-9 is markedly elevated.    -  Biliary stent was exchanged for metal stent to relieve obstruction / cholangitis.   I have recommended neoadjuvant  chemotherapy with Gemcitabine and Abraxane and he started treatment.  -  Will continue treatment and continue to work on symptom control.      2.  Nausea/Vomiting / Dehydration;  -  Will continue 1L NS daily with HH through Saturday because this really helped him.  -  Continue Emend, Sancuso patch. Continue Marinol 5 mg BID.  Continue Compazine as needed and Ativan 0.5-1 mg q4-6h prn breakthough nausea.  -  Will change Morphine to Fentanyl to see if this may help with nausea.    3.  Neoplasm related pain:  -  Will change Morphine 30 mg BID to Fentanyl 25 mcg and continue Oxycodone 5 mg 1-3 tabs q 4h prn (usually only uses 2 tabs/day but pain may not be totally controlled).   -  Could consider referral for block if needed.    4. Constipation: Continue Senna/ Colace ii BID with Mirallax as needed.  Bowels now moving well.      5.   History of Atrial fibrillation:  Chronically anticoagulated on Eliquis.     6.  Prophylaxis:  Has had 2018 influenza vaccine.      7.  ACO Quality measures  - Todd Phillips does not use tobacco products.  - Patient's Body mass index is 28.18 kg/m². BMI is above normal parameters. Recommendations include: none given current problems.  Will address in the future..  - Current outpatient and discharge medications have been reconciled for the patient.  Reviewed by:Gwen Alves MD     This note was scribed for Gwen Alves MD by Tamara Morejon RN.    I, Gwen Alves MD, personally performed the services described in this documentation as scribed by the above named individual in my presence, and it is both accurate and complete.  11/13/2018     I spent 25 minutes with Todd Phillips today.  More than 50% of the time was spent in counseling / coordination of care for the above problems.      Electronically Signed by: VIMAL Rolle      CC:   MD Miquel Quintana MD Aaron House, MD Michael Simons, MD

## 2018-11-13 NOTE — PROGRESS NOTES
SS spoke with Tamara Morejon and Dr. Alves.  MD request for patient to receive IV fluids starting today November 13 through Saturday November 17th.  SS contacted [facility] [phone] per Matilde to order IV fluids.  IV fluids to be delivered to pt's home today.    SS contacted [facility] [phone] per Sakina to make aware that IV fluids have been arranged for five more days.  SS will follow as needed.

## 2018-11-14 ENCOUNTER — TELEPHONE (OUTPATIENT)
Dept: ONCOLOGY | Facility: HOSPITAL | Age: 68
End: 2018-11-14

## 2018-11-14 LAB — CANCER AG19-9 SERPL-ACNC: 1576 U/ML (ref 0–35)

## 2018-11-14 NOTE — TELEPHONE ENCOUNTER
Telephoned pt to check on him after his Gemxar/ Abraxane treatment yesterday, spoke with pt's wife and she stated pt was doing well.  Pt's wife reports some episodes of nausea but denies any vomiting.  Pt's wife aware to call the office regarding any questions or concerns.

## 2018-11-16 ENCOUNTER — HOSPITAL ENCOUNTER (OUTPATIENT)
Facility: HOSPITAL | Age: 68
Setting detail: OBSERVATION
LOS: 1 days | Discharge: HOME | End: 2018-11-18
Attending: EMERGENCY MEDICINE | Admitting: EMERGENCY MEDICINE

## 2018-11-16 DIAGNOSIS — R41.82 ALTERED MENTAL STATUS, UNSPECIFIED ALTERED MENTAL STATUS TYPE: Primary | ICD-10-CM

## 2018-11-16 DIAGNOSIS — Z91.89 AT RISK FOR POLYPHARMACY: ICD-10-CM

## 2018-11-16 PROCEDURE — 99285 EMERGENCY DEPT VISIT HI MDM: CPT

## 2018-11-16 PROCEDURE — 93005 ELECTROCARDIOGRAM TRACING: CPT | Performed by: EMERGENCY MEDICINE

## 2018-11-16 RX ORDER — ASPIRIN 81 MG/1
324 TABLET, CHEWABLE ORAL ONCE
Status: DISCONTINUED | OUTPATIENT
Start: 2018-11-16 | End: 2018-11-17

## 2018-11-16 RX ORDER — SODIUM CHLORIDE 0.9 % (FLUSH) 0.9 %
10 SYRINGE (ML) INJECTION AS NEEDED
Status: DISCONTINUED | OUTPATIENT
Start: 2018-11-16 | End: 2018-11-18 | Stop reason: HOSPADM

## 2018-11-17 ENCOUNTER — APPOINTMENT (OUTPATIENT)
Dept: CT IMAGING | Facility: HOSPITAL | Age: 68
End: 2018-11-17

## 2018-11-17 ENCOUNTER — APPOINTMENT (OUTPATIENT)
Dept: GENERAL RADIOLOGY | Facility: HOSPITAL | Age: 68
End: 2018-11-17

## 2018-11-17 PROBLEM — D69.6 THROMBOCYTOPENIA (HCC): Status: ACTIVE | Noted: 2018-11-17

## 2018-11-17 PROBLEM — D64.81 ANEMIA DUE TO CHEMOTHERAPY: Chronic | Status: ACTIVE | Noted: 2018-11-17

## 2018-11-17 PROBLEM — R74.8 ELEVATED LIVER ENZYMES: Status: ACTIVE | Noted: 2018-11-17

## 2018-11-17 PROBLEM — T45.1X5A ANEMIA DUE TO CHEMOTHERAPY: Chronic | Status: ACTIVE | Noted: 2018-11-17

## 2018-11-17 PROBLEM — R41.82 ALTERED MENTAL STATUS: Status: ACTIVE | Noted: 2018-11-17

## 2018-11-17 LAB
ALBUMIN SERPL-MCNC: 3.5 G/DL (ref 3.2–4.8)
ALBUMIN/GLOB SERPL: 1.5 G/DL (ref 1.5–2.5)
ALP SERPL-CCNC: 242 U/L (ref 25–100)
ALT SERPL W P-5'-P-CCNC: 278 U/L (ref 7–40)
AMMONIA BLD-SCNC: 17 UMOL/L (ref 19–60)
ANION GAP SERPL CALCULATED.3IONS-SCNC: 4 MMOL/L (ref 3–11)
AST SERPL-CCNC: 192 U/L (ref 0–33)
BASOPHILS # BLD AUTO: 0 10*3/MM3 (ref 0–0.2)
BASOPHILS NFR BLD AUTO: 0 % (ref 0–1)
BILIRUB SERPL-MCNC: 1.4 MG/DL (ref 0.3–1.2)
BILIRUB UR QL STRIP: NEGATIVE
BNP SERPL-MCNC: 62 PG/ML (ref 0–100)
BUN BLD-MCNC: 20 MG/DL (ref 9–23)
BUN/CREAT SERPL: 22.2 (ref 7–25)
CALCIUM SPEC-SCNC: 8.6 MG/DL (ref 8.7–10.4)
CHLORIDE SERPL-SCNC: 106 MMOL/L (ref 99–109)
CLARITY UR: CLEAR
CO2 SERPL-SCNC: 24 MMOL/L (ref 20–31)
COLOR UR: YELLOW
CREAT BLD-MCNC: 0.9 MG/DL (ref 0.6–1.3)
DEPRECATED RDW RBC AUTO: 43.2 FL (ref 37–54)
EOSINOPHIL # BLD AUTO: 0.04 10*3/MM3 (ref 0–0.3)
EOSINOPHIL NFR BLD AUTO: 1.1 % (ref 0–3)
ERYTHROCYTE [DISTWIDTH] IN BLOOD BY AUTOMATED COUNT: 13.8 % (ref 11.3–14.5)
GFR SERPL CREATININE-BSD FRML MDRD: 83 ML/MIN/1.73
GLOBULIN UR ELPH-MCNC: 2.3 GM/DL
GLUCOSE BLD-MCNC: 122 MG/DL (ref 70–100)
GLUCOSE BLDC GLUCOMTR-MCNC: 141 MG/DL (ref 70–130)
GLUCOSE UR STRIP-MCNC: NEGATIVE MG/DL
HCT VFR BLD AUTO: 30.1 % (ref 38.9–50.9)
HGB BLD-MCNC: 10.2 G/DL (ref 13.1–17.5)
HGB UR QL STRIP.AUTO: NEGATIVE
HOLD SPECIMEN: NORMAL
IMM GRANULOCYTES # BLD: 0.02 10*3/MM3 (ref 0–0.03)
IMM GRANULOCYTES NFR BLD: 0.5 % (ref 0–0.6)
KETONES UR QL STRIP: NEGATIVE
LEUKOCYTE ESTERASE UR QL STRIP.AUTO: NEGATIVE
LIPASE SERPL-CCNC: 23 U/L (ref 6–51)
LYMPHOCYTES # BLD AUTO: 1.06 10*3/MM3 (ref 0.6–4.8)
LYMPHOCYTES NFR BLD AUTO: 28.4 % (ref 24–44)
MCH RBC QN AUTO: 29.6 PG (ref 27–31)
MCHC RBC AUTO-ENTMCNC: 33.9 G/DL (ref 32–36)
MCV RBC AUTO: 87.2 FL (ref 80–99)
MONOCYTES # BLD AUTO: 0.01 10*3/MM3 (ref 0–1)
MONOCYTES NFR BLD AUTO: 0.3 % (ref 0–12)
NEUTROPHILS # BLD AUTO: 2.6 10*3/MM3 (ref 1.5–8.3)
NEUTROPHILS NFR BLD AUTO: 69.7 % (ref 41–71)
NITRITE UR QL STRIP: NEGATIVE
PH UR STRIP.AUTO: 5.5 [PH] (ref 5–8)
PLATELET # BLD AUTO: 130 10*3/MM3 (ref 150–450)
PMV BLD AUTO: 9.5 FL (ref 6–12)
POTASSIUM BLD-SCNC: 4 MMOL/L (ref 3.5–5.5)
PROCALCITONIN SERPL-MCNC: 0.34 NG/ML
PROT SERPL-MCNC: 5.8 G/DL (ref 5.7–8.2)
PROT UR QL STRIP: NEGATIVE
RBC # BLD AUTO: 3.45 10*6/MM3 (ref 4.2–5.76)
SODIUM BLD-SCNC: 134 MMOL/L (ref 132–146)
SP GR UR STRIP: 1.01 (ref 1–1.03)
TROPONIN I SERPL-MCNC: 0 NG/ML (ref 0–0.07)
UROBILINOGEN UR QL STRIP: NORMAL
WBC NRBC COR # BLD: 3.73 10*3/MM3 (ref 3.5–10.8)
WHOLE BLOOD HOLD SPECIMEN: NORMAL
WHOLE BLOOD HOLD SPECIMEN: NORMAL

## 2018-11-17 PROCEDURE — 99220 PR INITIAL OBSERVATION CARE/DAY 70 MINUTES: CPT | Performed by: INTERNAL MEDICINE

## 2018-11-17 PROCEDURE — 80053 COMPREHEN METABOLIC PANEL: CPT | Performed by: EMERGENCY MEDICINE

## 2018-11-17 PROCEDURE — 83690 ASSAY OF LIPASE: CPT | Performed by: EMERGENCY MEDICINE

## 2018-11-17 PROCEDURE — 63710000001 INSULIN LISPRO (HUMAN) PER 5 UNITS: Performed by: INTERNAL MEDICINE

## 2018-11-17 PROCEDURE — 96376 TX/PRO/DX INJ SAME DRUG ADON: CPT

## 2018-11-17 PROCEDURE — 96374 THER/PROPH/DIAG INJ IV PUSH: CPT

## 2018-11-17 PROCEDURE — G0378 HOSPITAL OBSERVATION PER HR: HCPCS

## 2018-11-17 PROCEDURE — 71045 X-RAY EXAM CHEST 1 VIEW: CPT

## 2018-11-17 PROCEDURE — 82140 ASSAY OF AMMONIA: CPT | Performed by: EMERGENCY MEDICINE

## 2018-11-17 PROCEDURE — 74176 CT ABD & PELVIS W/O CONTRAST: CPT

## 2018-11-17 PROCEDURE — 70450 CT HEAD/BRAIN W/O DYE: CPT

## 2018-11-17 PROCEDURE — 63710000001 MORPHINE 30 MG TABLET CONTROLLED-RELEASE: Performed by: INTERNAL MEDICINE

## 2018-11-17 PROCEDURE — 25010000002 ONDANSETRON PER 1 MG: Performed by: INTERNAL MEDICINE

## 2018-11-17 PROCEDURE — 96375 TX/PRO/DX INJ NEW DRUG ADDON: CPT

## 2018-11-17 PROCEDURE — 71250 CT THORAX DX C-: CPT

## 2018-11-17 PROCEDURE — 82962 GLUCOSE BLOOD TEST: CPT

## 2018-11-17 PROCEDURE — 83880 ASSAY OF NATRIURETIC PEPTIDE: CPT | Performed by: EMERGENCY MEDICINE

## 2018-11-17 PROCEDURE — 87040 BLOOD CULTURE FOR BACTERIA: CPT | Performed by: EMERGENCY MEDICINE

## 2018-11-17 PROCEDURE — P9612 CATHETERIZE FOR URINE SPEC: HCPCS

## 2018-11-17 PROCEDURE — 81003 URINALYSIS AUTO W/O SCOPE: CPT | Performed by: EMERGENCY MEDICINE

## 2018-11-17 PROCEDURE — A9270 NON-COVERED ITEM OR SERVICE: HCPCS | Performed by: INTERNAL MEDICINE

## 2018-11-17 PROCEDURE — 84484 ASSAY OF TROPONIN QUANT: CPT

## 2018-11-17 PROCEDURE — 63710000001 DRONABINOL PER 2.5 MG: Performed by: INTERNAL MEDICINE

## 2018-11-17 PROCEDURE — 96361 HYDRATE IV INFUSION ADD-ON: CPT

## 2018-11-17 PROCEDURE — 63710000001 LORAZEPAM 0.5 MG TABLET: Performed by: INTERNAL MEDICINE

## 2018-11-17 PROCEDURE — 85025 COMPLETE CBC W/AUTO DIFF WBC: CPT | Performed by: EMERGENCY MEDICINE

## 2018-11-17 PROCEDURE — 84145 PROCALCITONIN (PCT): CPT | Performed by: EMERGENCY MEDICINE

## 2018-11-17 PROCEDURE — 25010000002 PROCHLORPERAZINE EDISYLATE PER 10 MG: Performed by: INTERNAL MEDICINE

## 2018-11-17 RX ORDER — SODIUM CHLORIDE 0.9 % (FLUSH) 0.9 %
3 SYRINGE (ML) INJECTION EVERY 12 HOURS SCHEDULED
Status: DISCONTINUED | OUTPATIENT
Start: 2018-11-17 | End: 2018-11-18 | Stop reason: HOSPADM

## 2018-11-17 RX ORDER — NICOTINE POLACRILEX 4 MG
15 LOZENGE BUCCAL
Status: DISCONTINUED | OUTPATIENT
Start: 2018-11-17 | End: 2018-11-18 | Stop reason: HOSPADM

## 2018-11-17 RX ORDER — FLECAINIDE ACETATE 50 MG/1
50 TABLET ORAL EVERY 12 HOURS SCHEDULED
Status: DISCONTINUED | OUTPATIENT
Start: 2018-11-17 | End: 2018-11-18 | Stop reason: HOSPADM

## 2018-11-17 RX ORDER — MORPHINE SULFATE 30 MG/1
30 TABLET, FILM COATED, EXTENDED RELEASE ORAL EVERY 12 HOURS SCHEDULED
Status: DISCONTINUED | OUTPATIENT
Start: 2018-11-17 | End: 2018-11-18 | Stop reason: HOSPADM

## 2018-11-17 RX ORDER — DRONABINOL 2.5 MG/1
5 CAPSULE ORAL
Status: DISCONTINUED | OUTPATIENT
Start: 2018-11-17 | End: 2018-11-18 | Stop reason: HOSPADM

## 2018-11-17 RX ORDER — ALBUTEROL SULFATE 2.5 MG/3ML
2.5 SOLUTION RESPIRATORY (INHALATION) EVERY 6 HOURS PRN
Status: DISCONTINUED | OUTPATIENT
Start: 2018-11-17 | End: 2018-11-18 | Stop reason: HOSPADM

## 2018-11-17 RX ORDER — PROCHLORPERAZINE 25 MG
25 SUPPOSITORY, RECTAL RECTAL EVERY 12 HOURS PRN
Status: DISCONTINUED | OUTPATIENT
Start: 2018-11-17 | End: 2018-11-18 | Stop reason: HOSPADM

## 2018-11-17 RX ORDER — SODIUM CHLORIDE 9 MG/ML
125 INJECTION, SOLUTION INTRAVENOUS CONTINUOUS
Status: DISCONTINUED | OUTPATIENT
Start: 2018-11-17 | End: 2018-11-18 | Stop reason: HOSPADM

## 2018-11-17 RX ORDER — OXYCODONE HYDROCHLORIDE 5 MG/1
5 TABLET ORAL EVERY 4 HOURS PRN
Status: DISCONTINUED | OUTPATIENT
Start: 2018-11-17 | End: 2018-11-18 | Stop reason: HOSPADM

## 2018-11-17 RX ORDER — ONDANSETRON 4 MG/1
8 TABLET, FILM COATED ORAL EVERY 8 HOURS PRN
Status: DISCONTINUED | OUTPATIENT
Start: 2018-11-17 | End: 2018-11-18 | Stop reason: HOSPADM

## 2018-11-17 RX ORDER — FLUOXETINE HYDROCHLORIDE 20 MG/1
20 CAPSULE ORAL NIGHTLY
Status: DISCONTINUED | OUTPATIENT
Start: 2018-11-17 | End: 2018-11-18 | Stop reason: HOSPADM

## 2018-11-17 RX ORDER — MONTELUKAST SODIUM 10 MG/1
10 TABLET ORAL DAILY
Status: DISCONTINUED | OUTPATIENT
Start: 2018-11-17 | End: 2018-11-18 | Stop reason: HOSPADM

## 2018-11-17 RX ORDER — PROCHLORPERAZINE MALEATE 5 MG/1
5 TABLET ORAL EVERY 6 HOURS PRN
Status: DISCONTINUED | OUTPATIENT
Start: 2018-11-17 | End: 2018-11-18 | Stop reason: HOSPADM

## 2018-11-17 RX ORDER — ONDANSETRON 2 MG/ML
4 INJECTION INTRAMUSCULAR; INTRAVENOUS EVERY 6 HOURS PRN
Status: DISCONTINUED | OUTPATIENT
Start: 2018-11-17 | End: 2018-11-18 | Stop reason: HOSPADM

## 2018-11-17 RX ORDER — ACETAMINOPHEN 325 MG/1
650 TABLET ORAL EVERY 4 HOURS PRN
Status: DISCONTINUED | OUTPATIENT
Start: 2018-11-17 | End: 2018-11-18 | Stop reason: HOSPADM

## 2018-11-17 RX ORDER — MELATONIN
1000 DAILY
Status: DISCONTINUED | OUTPATIENT
Start: 2018-11-17 | End: 2018-11-18 | Stop reason: HOSPADM

## 2018-11-17 RX ORDER — SODIUM CHLORIDE 9 MG/ML
1000 INJECTION, SOLUTION INTRAVENOUS DAILY
COMMUNITY
End: 2018-12-07

## 2018-11-17 RX ORDER — PANTOPRAZOLE SODIUM 40 MG/1
40 TABLET, DELAYED RELEASE ORAL EVERY MORNING
Status: DISCONTINUED | OUTPATIENT
Start: 2018-11-17 | End: 2018-11-18 | Stop reason: HOSPADM

## 2018-11-17 RX ORDER — DEXTROSE MONOHYDRATE 25 G/50ML
25 INJECTION, SOLUTION INTRAVENOUS
Status: DISCONTINUED | OUTPATIENT
Start: 2018-11-17 | End: 2018-11-18 | Stop reason: HOSPADM

## 2018-11-17 RX ORDER — LOSARTAN POTASSIUM 25 MG/1
25 TABLET ORAL DAILY
Status: DISCONTINUED | OUTPATIENT
Start: 2018-11-17 | End: 2018-11-18 | Stop reason: HOSPADM

## 2018-11-17 RX ORDER — SODIUM CHLORIDE 0.9 % (FLUSH) 0.9 %
1-10 SYRINGE (ML) INJECTION AS NEEDED
Status: DISCONTINUED | OUTPATIENT
Start: 2018-11-17 | End: 2018-11-18 | Stop reason: HOSPADM

## 2018-11-17 RX ORDER — LORAZEPAM 0.5 MG/1
0.5 TABLET ORAL EVERY 8 HOURS PRN
Status: DISCONTINUED | OUTPATIENT
Start: 2018-11-17 | End: 2018-11-18 | Stop reason: HOSPADM

## 2018-11-17 RX ORDER — COLCHICINE 0.6 MG/1
0.6 TABLET ORAL DAILY
Status: DISCONTINUED | OUTPATIENT
Start: 2018-11-17 | End: 2018-11-18 | Stop reason: HOSPADM

## 2018-11-17 RX ADMIN — ONDANSETRON 4 MG: 2 INJECTION INTRAMUSCULAR; INTRAVENOUS at 12:42

## 2018-11-17 RX ADMIN — PROCHLORPERAZINE EDISYLATE 5 MG: 5 INJECTION INTRAMUSCULAR; INTRAVENOUS at 17:39

## 2018-11-17 RX ADMIN — MORPHINE SULFATE 30 MG: 30 TABLET, EXTENDED RELEASE ORAL at 09:43

## 2018-11-17 RX ADMIN — APIXABAN 5 MG: 5 TABLET, FILM COATED ORAL at 21:00

## 2018-11-17 RX ADMIN — OXYCODONE HYDROCHLORIDE 5 MG: 5 TABLET ORAL at 17:10

## 2018-11-17 RX ADMIN — ONDANSETRON 4 MG: 2 INJECTION INTRAMUSCULAR; INTRAVENOUS at 06:35

## 2018-11-17 RX ADMIN — DRONABINOL 5 MG: 2.5 CAPSULE ORAL at 17:10

## 2018-11-17 RX ADMIN — LORAZEPAM 0.5 MG: 0.5 TABLET ORAL at 10:57

## 2018-11-17 RX ADMIN — MORPHINE SULFATE 30 MG: 30 TABLET, EXTENDED RELEASE ORAL at 19:50

## 2018-11-17 RX ADMIN — Medication 3 ML: at 21:02

## 2018-11-17 RX ADMIN — PROCHLORPERAZINE EDISYLATE 5 MG: 5 INJECTION INTRAMUSCULAR; INTRAVENOUS at 11:52

## 2018-11-17 RX ADMIN — SODIUM CHLORIDE 125 ML/HR: 9 INJECTION, SOLUTION INTRAVENOUS at 22:28

## 2018-11-17 RX ADMIN — OXYCODONE HYDROCHLORIDE 5 MG: 5 TABLET ORAL at 11:31

## 2018-11-17 RX ADMIN — SODIUM CHLORIDE 125 ML/HR: 9 INJECTION, SOLUTION INTRAVENOUS at 06:35

## 2018-11-17 RX ADMIN — ONDANSETRON 8 MG: 4 TABLET, FILM COATED ORAL at 19:51

## 2018-11-17 RX ADMIN — MORPHINE SULFATE 30 MG: 30 TABLET, EXTENDED RELEASE ORAL at 21:00

## 2018-11-17 RX ADMIN — FLUOXETINE HYDROCHLORIDE 20 MG: 20 CAPSULE ORAL at 21:00

## 2018-11-17 RX ADMIN — FLECAINIDE ACETATE 50 MG: 50 TABLET ORAL at 21:00

## 2018-11-17 NOTE — ED PROVIDER NOTES
Subjective   Mr. Todd Phillips is a 68 y.o. male with a hx of who presents to the ED with c/o CP. His sons report for the past 3-4 days Mr. Phillips has had confusion and personality changes, last night he developed SoA and a cough, and tonight he developed a fever and chills. He also complains of CP and SoA. His sons indicate that Mr. Phillips has had sepsis several times in the past 2/2 a malignant neoplasm of the head of his pancreas blocking off his bile duct. He has a hx of COPD, HLD, HTN, DM, a-fib, and CAD. Dr. Alves is his oncologist. No other acute complaints at this time.         History provided by:  Relative and patient  History limited by:  Mental status change  Fever   Severity:  Moderate  Onset quality:  Gradual  Chronicity:  New  Associated symptoms: chest pain, chills, confusion and cough        Review of Systems   Unable to perform ROS: Mental status change   Constitutional: Positive for chills and fever.   Respiratory: Positive for cough and shortness of breath.    Cardiovascular: Positive for chest pain.   Gastrointestinal: Positive for abdominal pain.   Psychiatric/Behavioral: Positive for confusion.       Past Medical History:   Diagnosis Date   • Antral gastritis    • Asthma    • Atrial fibrillation (CMS/HCC)     treated with oral blood thinner   • Chest pain    • COPD (chronic obstructive pulmonary disease) (CMS/HCC)    • Coronary artery disease     no stents   • Diabetes mellitus (CMS/HCC)     type 2    • Duodenitis    • Elevated cholesterol    • Emphysema of lung (CMS/HCC)    • Gallbladder disease     removed    • GERD (gastroesophageal reflux disease)    • Hyperlipidemia    • Hypertension    • Jaundice    • Kidney stone    • Kidney stones     had lithotripsy and passed 36 kidney stones as well as had one surgically removed.   • Malignant neoplasm of head of pancreas (CMS/HCC) 9/5/2018   • Palpitations    • Pneumonia 08/2018   • Reflux esophagitis    • Renal failure     stage 3 kidney disease    • Sepsis (CMS/HCC)        Allergies   Allergen Reactions   • Contrast Dye Other (See Comments)     Can't have due to kidney failure per family       Past Surgical History:   Procedure Laterality Date   • BILE DUCT STENT PLACEMENT      [facility] 2 weeks ago    • CARDIAC CATHETERIZATION  11/01/1999   • CARDIOVASCULAR STRESS TEST  09/2012   • COLONOSCOPY     • CYSTOSCOPY BLADDER STONE LITHOTRIPSY     • ECHO - CONVERTED  09/2012   • KIDNEY STONE SURGERY     • KIDNEY STONE SURGERY      open abdominal surgery   • UPPER GASTROINTESTINAL ENDOSCOPY  08/30/2012       Family History   Problem Relation Age of Onset   • Heart attack Mother    • Heart disease Mother    • Stroke Mother    • Kidney disease Mother    • Heart failure Mother    • Lung disease Father    • Tuberculosis Father    • Diabetes Sister    • Heart attack Brother    • Heart failure Brother    • Heart disease Brother    • Diabetes Sister    • No Known Problems Brother    • No Known Problems Brother        Social History     Socioeconomic History   • Marital status:      Spouse name: Not on file   • Number of children: Not on file   • Years of education: Not on file   • Highest education level: Not on file   Tobacco Use   • Smoking status: Never Smoker   • Smokeless tobacco: Never Used   Substance and Sexual Activity   • Alcohol use: No   • Drug use: No   • Sexual activity: Defer     Partners: Female   Social History Narrative    He is  and lives alone with his wife although they have 3 children together who all live close by. He is retired from the [employer] and was exposed to Agent Orange during [place]. He is a lifelong nonsmoker.          Objective   Physical Exam   Constitutional: He appears well-developed and well-nourished. No distress.   HENT:   Head: Normocephalic and atraumatic.   Nose: Nose normal.   Eyes: Conjunctivae are normal. Scleral icterus is present.   Patient has mild scleral icterus.    Neck: Normal range of  motion. Neck supple.   Cardiovascular: Normal rate, regular rhythm and normal heart sounds.   No murmur heard.  Pulmonary/Chest: Effort normal and breath sounds normal. No respiratory distress.   Abdominal: Soft. There is tenderness. There is no rebound and no guarding.   Patient has diffuse abdominal TTP with no guarding or rebound. Surprisingly, the patient has no increased TTP to the RUQ.   Musculoskeletal: Normal range of motion. He exhibits no edema (No peripheral edema).   Neurological: He is alert.   No focal neurological deficits.   Skin: Skin is warm and dry. He is not diaphoretic.   No significant jaundice is appreciated.   Psychiatric: He has a normal mood and affect. His behavior is normal.   Nursing note and vitals reviewed.      Procedures         ED Course  ED Course as of Nov 17 0417   Sat Nov 17, 2018   4166 Paged Dr. Ross, hospitalist. -CP  [AT]      ED Course User Index  [AT] Phyllis Muniz SUSANNAH       Recent Results (from the past 24 hour(s))   Comprehensive Metabolic Panel    Collection Time: 11/17/18 12:26 AM   Result Value Ref Range    Glucose 122 (H) 70 - 100 mg/dL    BUN 20 9 - 23 mg/dL    Creatinine 0.90 0.60 - 1.30 mg/dL    Sodium 134 132 - 146 mmol/L    Potassium 4.0 3.5 - 5.5 mmol/L    Chloride 106 99 - 109 mmol/L    CO2 24.0 20.0 - 31.0 mmol/L    Calcium 8.6 (L) 8.7 - 10.4 mg/dL    Total Protein 5.8 5.7 - 8.2 g/dL    Albumin 3.50 3.20 - 4.80 g/dL    ALT (SGPT) 278 (H) 7 - 40 U/L    AST (SGOT) 192 (H) 0 - 33 U/L    Alkaline Phosphatase 242 (H) 25 - 100 U/L    Total Bilirubin 1.4 (H) 0.3 - 1.2 mg/dL    eGFR Non African Amer 83 >60 mL/min/1.73    Globulin 2.3 gm/dL    A/G Ratio 1.5 1.5 - 2.5 g/dL    BUN/Creatinine Ratio 22.2 7.0 - 25.0    Anion Gap 4.0 3.0 - 11.0 mmol/L   Lipase    Collection Time: 11/17/18 12:26 AM   Result Value Ref Range    Lipase 23 6 - 51 U/L   BNP    Collection Time: 11/17/18 12:26 AM   Result Value Ref Range    BNP 62.0 0.0 - 100.0 pg/mL   Light Blue Top    Collection  Time: 11/17/18 12:26 AM   Result Value Ref Range    Extra Tube hold for add-on    Green Top (Gel)    Collection Time: 11/17/18 12:26 AM   Result Value Ref Range    Extra Tube Hold for add-ons.    Lavender Top    Collection Time: 11/17/18 12:26 AM   Result Value Ref Range    Extra Tube hold for add-on    Gold Top - SST    Collection Time: 11/17/18 12:26 AM   Result Value Ref Range    Extra Tube Hold for add-ons.    Green Top (No Gel)    Collection Time: 11/17/18 12:26 AM   Result Value Ref Range    Extra Tube Hold for add-ons.    CBC Auto Differential    Collection Time: 11/17/18 12:26 AM   Result Value Ref Range    WBC 3.73 3.50 - 10.80 10*3/mm3    RBC 3.45 (L) 4.20 - 5.76 10*6/mm3    Hemoglobin 10.2 (L) 13.1 - 17.5 g/dL    Hematocrit 30.1 (L) 38.9 - 50.9 %    MCV 87.2 80.0 - 99.0 fL    MCH 29.6 27.0 - 31.0 pg    MCHC 33.9 32.0 - 36.0 g/dL    RDW 13.8 11.3 - 14.5 %    RDW-SD 43.2 37.0 - 54.0 fl    MPV 9.5 6.0 - 12.0 fL    Platelets 130 (L) 150 - 450 10*3/mm3    Neutrophil % 69.7 41.0 - 71.0 %    Lymphocyte % 28.4 24.0 - 44.0 %    Monocyte % 0.3 0.0 - 12.0 %    Eosinophil % 1.1 0.0 - 3.0 %    Basophil % 0.0 0.0 - 1.0 %    Immature Grans % 0.5 0.0 - 0.6 %    Neutrophils, Absolute 2.60 1.50 - 8.30 10*3/mm3    Lymphocytes, Absolute 1.06 0.60 - 4.80 10*3/mm3    Monocytes, Absolute 0.01 0.00 - 1.00 10*3/mm3    Eosinophils, Absolute 0.04 0.00 - 0.30 10*3/mm3    Basophils, Absolute 0.00 0.00 - 0.20 10*3/mm3    Immature Grans, Absolute 0.02 0.00 - 0.03 10*3/mm3   Procalcitonin    Collection Time: 11/17/18 12:26 AM   Result Value Ref Range    Procalcitonin 0.34 (H) <=0.25 ng/mL   Ammonia    Collection Time: 11/17/18 12:32 AM   Result Value Ref Range    Ammonia 17 (L) 19 - 60 umol/L   POC Troponin, Rapid    Collection Time: 11/17/18 12:37 AM   Result Value Ref Range    Troponin I 0.00 0.00 - 0.07 ng/mL   Urinalysis With Culture If Indicated - Urine, Catheter    Collection Time: 11/17/18  2:22 AM   Result Value Ref Range     Color, UA Yellow Yellow, Straw    Appearance, UA Clear Clear    pH, UA 5.5 5.0 - 8.0    Specific Gravity, UA 1.015 1.001 - 1.030    Glucose, UA Negative Negative    Ketones, UA Negative Negative    Bilirubin, UA Negative Negative    Blood, UA Negative Negative    Protein, UA Negative Negative    Leuk Esterase, UA Negative Negative    Nitrite, UA Negative Negative    Urobilinogen, UA 1.0 E.U./dL 0.2 - 1.0 E.U./dL     Note: In addition to lab results from this visit, the labs listed above may include labs taken at another facility or during a different encounter within the last 24 hours. Please correlate lab times with ED admission and discharge times for further clarification of the services performed during this visit.    CT Head Without Contrast   Final Result   No acute infarct, hemorrhage or mass      THIS DOCUMENT HAS BEEN ELECTRONICALLY SIGNED BY JOYA REBOLLEDO MD      CT Chest Without Contrast   Final Result   Unremarkable noncontrast CT of the chest.       THIS DOCUMENT HAS BEEN ELECTRONICALLY SIGNED BY DANETTE TODD MD      CT Abdomen Pelvis Without Contrast   Final Result   1. Interval pancreatic stent extending from the common bile duct into the    ampulla without significant bile duct dilatation. Underlying apparent    pancreatic head mass.    2. Small hiatal hernia which can be associated with abdominal pain in the    setting of GE reflux       THIS DOCUMENT HAS BEEN ELECTRONICALLY SIGNED BY DANETTE TODD MD      XR Chest 1 View   Final Result   1.  Ovoid collection of air projecting over the left lung base, new from prior    exam, suspect air in transverse colon or gastric hiatal hernia.   2.  New right-sided chest port.      THIS DOCUMENT HAS BEEN ELECTRONICALLY SIGNED BY WINDY JOHNSON MD        Vitals:    11/17/18 0048 11/17/18 0130 11/17/18 0233 11/17/18 0400   BP:  104/67 114/71 (!) 188/96   BP Location:   Left arm    Patient Position:   Lying    Pulse: 83 82 79    Resp:  16 16    Temp:        TempSrc:       SpO2: 95% 95% 95%    Weight:       Height:         Medications   sodium chloride 0.9 % flush 10 mL (not administered)     ECG/EMG Results (last 24 hours)     Procedure Component Value Units Date/Time    ECG 12 Lead [[id number]] Collected:  11/16/18 2340     Updated:  11/16/18 2342                      [facility]    Final diagnoses:   Altered mental status, unspecified altered mental status type   At risk for polypharmacy       Documentation assistance provided by tammy Muniz.  Information recorded by the scribe was done at my direction and has been verified and validated by me.     Phyllis Muniz  11/17/18 0113       Phyllis Muniz  11/17/18 0417       Babak Suárez DO  11/17/18 0711

## 2018-11-17 NOTE — PLAN OF CARE
Problem: Patient Care Overview  Goal: Interprofessional Rounds/Family Conf  Outcome: Ongoing (interventions implemented as appropriate)  Patient having pain and N/V today, tough to control, taking as many PRN meds as allowable. Fentanyl patch removed- sons thought this caused his confusion/agression. Pt requesting his pain medicine, even the PRN meds, to be given around the clock.     Problem: Skin Injury Risk (Adult)  Goal: Identify Related Risk Factors and Signs and Symptoms  Outcome: Ongoing (interventions implemented as appropriate)    Goal: Skin Health and Integrity  Outcome: Ongoing (interventions implemented as appropriate)

## 2018-11-17 NOTE — PLAN OF CARE
Problem: Patient Care Overview  Goal: Plan of Care Review  Outcome: Ongoing (interventions implemented as appropriate)   11/17/18 0636   Coping/Psychosocial   Plan of Care Reviewed With patient;son   Plan of Care Review   Progress no change   OTHER   Outcome Summary Pt's vitals stable, answered appropriately, but distracted by nausea and abdominal pain. Zofran given - pt appears comfortable at the moment.        Problem: Fall Risk (Adult)  Goal: Identify Related Risk Factors and Signs and Symptoms  Outcome: Ongoing (interventions implemented as appropriate)   11/17/18 0636   Fall Risk (Adult)   Related Risk Factors (Fall Risk) fatigue/slow reaction;history of falls;polypharmacy;environment unfamiliar   Signs and Symptoms (Fall Risk) presence of risk factors     Goal: Absence of Fall  Outcome: Ongoing (interventions implemented as appropriate)   11/17/18 0636   Fall Risk (Adult)   Absence of Fall making progress toward outcome       Problem: Nausea/Vomiting (Adult)  Goal: Identify Related Risk Factors and Signs and Symptoms  Outcome: Ongoing (interventions implemented as appropriate)   11/17/18 0636   Nausea/Vomiting (Adult)   Related Risk Factors (Nausea/Vomiting) chemotherapy treatment   Signs and Symptoms (Nausea/Vomiting) abdominal discomfort/pain;report of emesis;weakness     Goal: Symptom Relief  Outcome: Ongoing (interventions implemented as appropriate)   11/17/18 0636   Nausea/Vomiting (Adult)   Symptom Relief making progress toward outcome     Goal: Adequate Hydration  Outcome: Ongoing (interventions implemented as appropriate)   11/17/18 0636   Nausea/Vomiting (Adult)   Adequate Hydration making progress toward outcome       Problem: Pain, Acute (Adult)  Goal: Identify Related Risk Factors and Signs and Symptoms  Outcome: Ongoing (interventions implemented as appropriate)   11/17/18 0636   Pain, Acute (Adult)   Related Risk Factors (Acute Pain) disease process;persistent pain   Signs and Symptoms (Acute  Pain) fatigue/weakness;facial mask of pain/grimace;nausea/vomiting/anorexia;verbalization of pain descriptors     Goal: Acceptable Pain Control/Comfort Level  Outcome: Ongoing (interventions implemented as appropriate)   11/17/18 0636   Pain, Acute (Adult)   Acceptable Pain Control/Comfort Level making progress toward outcome

## 2018-11-17 NOTE — H&P
"    [facility] Medicine Services  HISTORY AND PHYSICAL    Patient Name: Todd Phillips  : 1950  MRN: [id number]  Primary Care Physician: Adiel Denney MD  Date of admission: 2018      Subjective   Subjective     Chief Complaint:  AMS    HPI:  Todd Phillips is a 68 y.o. male with PMH of pancreatic cancer currently undergoing chemo (completed cycle 1 of gemcitabine and Abraxane), Afib on Eliquis, COPD and recent episodes of ESBL Ecoli bacteremia (2018) and Klebsiella bacteremia (2018) who presents with complaints of altered mental status and \"fever\".  Pt is a poor historian currently, so most history is obtained from sons at bedside. Per sons, pt has recently been switched from his MS contin and Oxycodone pain medication to a Fentanyl patch by his Oncologist and, since then, he has been acting differently.  Over the last few days, pt has had a low grade \"fever\" (Tmax of 100.6F).  Family was concerned he could be septic again or could have elevated ammonia, so they brought him here.  Pt denies any other systemic symptoms, but, again, he is a poor historian at present.     Review of Systems   General- no F/C, no weight loss or gain, no fatigue  HEENT- no blurry vision, no nasal congestion, no hearing loss, no sore throat, no dysphagia, no oral ulcers, no dental caries, no bleeding gums  CVS- no chest pain, no palpitations  Pulm- no SOA, no cough, no orthopnea, no PND  GI- no diarrhea, no constipation, no BRBPR, no nausea, no vomiting  MSK- no joint pain, no swelling, no erythema  - no dysuria, no hematuria  Neuro- no headaches, no focal motor deficits  Psych- no SI/ HI, no depression/anxiety  Otherwise 10-system ROS reviewed and is negative except as mentioned in the HPI.    Personal History     Past Medical History:   Diagnosis Date   • Antral gastritis    • Asthma    • Atrial fibrillation (CMS/HCC)     treated with oral blood thinner   • Chest pain    • COPD " (chronic obstructive pulmonary disease) (CMS/HCC)    • Coronary artery disease     no stents   • Diabetes mellitus (CMS/HCC)     type 2    • Duodenitis    • Elevated cholesterol    • Emphysema of lung (CMS/HCC)    • Gallbladder disease     removed    • GERD (gastroesophageal reflux disease)    • Hyperlipidemia    • Hypertension    • Jaundice    • Kidney stone    • Kidney stones     had lithotripsy and passed 36 kidney stones as well as had one surgically removed.   • Malignant neoplasm of head of pancreas (CMS/HCC) 9/5/2018   • Palpitations    • Pneumonia 08/2018   • Reflux esophagitis    • Renal failure     stage 3 kidney disease   • Sepsis (CMS/HCC)        Past Surgical History:   Procedure Laterality Date   • BILE DUCT STENT PLACEMENT         • CARDIAC CATHETERIZATION  11/01/1999   • CARDIOVASCULAR STRESS TEST  09/2012   • COLONOSCOPY     • CYSTOSCOPY BLADDER STONE LITHOTRIPSY     • ECHO - CONVERTED  09/2012   • KIDNEY STONE SURGERY     • KIDNEY STONE SURGERY      open abdominal surgery   • UPPER GASTROINTESTINAL ENDOSCOPY  08/30/2012       Family History: family history includes Diabetes in his sister and sister; Heart attack in his brother and mother; Heart disease in his brother and mother; Heart failure in his brother and mother; Kidney disease in his mother; Lung disease in his father; No Known Problems in his brother and brother; Stroke in his mother; Tuberculosis in his father. Otherwise pertinent FHx was reviewed and unremarkable.     Social History:  reports that  has never smoked. he has never used smokeless tobacco. He reports that he does not drink alcohol or use drugs.  Social History     Social History Narrative    He is  and lives alone with his wife although they have 3 children together who all live close by. He is retired from the [employer] and was exposed to Agent Orange during [place]. He is a lifelong nonsmoker.        Medications:  Current Facility-Administered Medications on File  Prior to Encounter   Medication Dose Route Frequency Provider Last Rate Last Dose   • sodium chloride 0.9 % infusion  - ADS Override Pull            • sodium chloride 0.9 % infusion  - ADS Override Pull              Current Outpatient Medications on File Prior to Encounter   Medication Sig Dispense Refill   • albuterol (PROVENTIL HFA;VENTOLIN HFA) 108 (90 BASE) MCG/ACT inhaler Inhale 2 puffs Every 4 (Four) Hours As Needed for Wheezing or Shortness of Air.     • apixaban (ELIQUIS) 5 MG tablet tablet Take 1 tablet by mouth Every 12 (Twelve) Hours. 60 tablet 0   • cholecalciferol (VITAMIN D3) 1000 units tablet Take 1,000 Units by mouth Daily.     • colchicine 0.6 MG tablet Take 1 tablet by mouth Daily. 90 tablet 2   • dronabinol (MARINOL) 5 MG capsule Take 1 capsule by mouth 2 (Two) Times a Day Before Meals. 60 capsule 0   • fentaNYL (DURAGESIC) 25 MCG/HR patch Place 1 patch on the skin as directed by provider Every 72 (Seventy-Two) Hours. 10 each 0   • flecainide (TAMBOCOR) 50 MG tablet Take 1 tablet by mouth Every 12 (Twelve) Hours. 60 tablet 0   • FLUoxetine (PROzac) 20 MG capsule Take 20 mg by mouth Every Night.     • granisetron (SANCUSO) 3.1 MG/24HR Place 1 patch on the skin as directed by provider Every 7 (Seven) Days. 4 patch 3   • lansoprazole (PREVACID) 30 MG capsule Take 30 mg by mouth Daily.     • lidocaine-prilocaine (EMLA) 2.5-2.5 % cream Apply topically to the appropriate area as directed Every 2 (Two) Hours As Needed for Mild Pain . 30 g 5   • linaclotide (LINZESS) 145 MCG capsule capsule Take 1 capsule by mouth Every Morning Before Breakfast. 30 capsule 3   • LORazepam (ATIVAN) 0.5 MG tablet Take 1 tablet by mouth Every 8 (Eight) Hours As Needed (for breakthrough nausea). 60 tablet 0   • losartan (COZAAR) 100 MG tablet Take 1 tablet by mouth Daily. (Patient taking differently: Take 25 mg by mouth Daily.) 90 tablet 3   • metoprolol tartrate (LOPRESSOR) 50 MG tablet Take 25 mg by mouth As Needed.     •  mometasone (ASMANEX) 220 MCG/INH inhaler Inhale 2 puffs Every Night.     • montelukast (SINGULAIR) 10 MG tablet Take 10 mg by mouth Daily.     • nitroglycerin (NITROSTAT) 0.4 MG SL tablet Place 0.4 mg under the tongue as needed for chest pain.     • ondansetron (ZOFRAN) 8 MG tablet Take 1 tablet by mouth Every 8 (Eight) Hours As Needed for Nausea or Vomiting. 90 tablet 6   • oxyCODONE (ROXICODONE) 5 MG immediate release tablet Take 1-3 tabs every 4 hours as needed for pain 200 tablet 0   • pioglitazone (ACTOS) 30 MG tablet Take 30 mg by mouth Daily.     • polyethylene glycol (MIRALAX) packet Take 17 g by mouth Daily As Needed.     • predniSONE (DELTASONE) 10 MG tablet Take 4 tabs x 2 days, 3 tabs x 2 days, 2 tabs x 2 days, 1 tab x 2 days, then stop 20 tablet 0   • prochlorperazine (COMPAZINE) 10 MG tablet Take 1 tablet by mouth Every 6 (Six) Hours As Needed for Nausea or Vomiting. 60 tablet 5   • sodium chloride 0.9 % solution Infuse 1,000 mL into a venous catheter Daily. To be given over 4 hours     • sore muscle (ICY HOT EXTRA STRENGTH) 10-30 % cream cream Apply 1 application topically to the appropriate area as directed 3 (Three) Times a Day As Needed (local pain).     • Tiotropium Bromide-Olodaterol 2.5-2.5 MCG/ACT aerosol solution Inhale 2 puffs Daily.         Allergies   Allergen Reactions   • Contrast Dye Other (See Comments)     Can't have due to kidney failure per family       Objective   Objective     Vital Signs:   Temp:  [98.2 °F (36.8 °C)-98.7 °F (37.1 °C)] 98.7 °F (37.1 °C)  Heart Rate:  [] 81  Resp:  [16-18] 16  BP: (104-188)/(67-96) 109/73        Physical Exam   Constitutional: No acute distress, lethargic, slow to respond  Eyes: PERRLA, sclerae anicteric, no conjunctival injection  HENT: NCAT, mucous membranes dry  Neck: Supple, no thyromegaly, no lymphadenopathy, trachea midline  Respiratory: Clear to auscultation bilaterally, nonlabored respirations   Cardiovascular: RRR, no murmurs, rubs,  or gallops, palpable pedal pulses bilaterally  Gastrointestinal: Positive bowel sounds, soft, nontender, nondistended  Musculoskeletal: No bilateral ankle edema, no clubbing or cyanosis to extremities  Psychiatric: flat affect, cooperative  Neurologic: Oriented x 3, but slow to answer questions, strength symmetric in all extremities, Cranial Nerves grossly intact to confrontation, speech clear  Skin: No rashes    Results Reviewed:  I have personally reviewed current lab, radiology, and data and agree.    Results from last 7 days   Lab Units  11/17/18   0026   WBC 10*3/mm3  3.73   HEMOGLOBIN g/dL  10.2*   HEMATOCRIT %  30.1*   PLATELETS 10*3/mm3  130*     Results from last 7 days   Lab Units  11/17/18   0026   SODIUM mmol/L  134   POTASSIUM mmol/L  4.0   CHLORIDE mmol/L  106   CO2 mmol/L  24.0   BUN mg/dL  20   CREATININE mg/dL  0.90   GLUCOSE mg/dL  122*   CALCIUM mg/dL  8.6*   ALT (SGPT) U/L  278*   AST (SGOT) U/L  192*     Estimated Creatinine Clearance: 95.8 mL/min (by C-G formula based on SCr of 0.9 mg/dL).  Brief Urine Lab Results  (Last result in the past 365 days)      Color   Clarity   Blood   Leuk Est   Nitrite   Protein   CREAT   Urine HCG        11/17/18 0222 Yellow Clear Negative Negative Negative Negative             BNP   Date Value Ref Range Status   11/17/2018 62.0 0.0 - 100.0 pg/mL Final     Comment:     Results may be falsely decreased if patient taking Biotin.     Imaging Results (last 24 hours)     Procedure Component Value Units Date/Time    CT Head Without Contrast [[id number]] Collected:  11/17/18 0414     Updated:  11/17/18 0518    Narrative:       EXAM:    CT Head Without Intravenous Contrast     EXAM DATE/TIME:    11/17/2018 4:14 AM     CLINICAL HISTORY:    68 years old, male; Altered mental status, unspecified; Other specified   personal risk factors, not elsewhere classified; Signs and symptoms; Altered   mental status/memory loss; Additional info: AMS     TECHNIQUE:    Axial computed  tomography images of the head/brain without intravenous   contrast.    All CT scans at this facility use at least one of these dose optimization   techniques: automated exposure control; mA and/or kV adjustment per patient   size (includes targeted exams where dose is matched to clinical indication); or   iterative reconstruction.     COMPARISON:    No relevant prior studies available.     FINDINGS:    Brain:  No hemorrhage. No significant white matter disease. No edema.    Ventricles: Normal. No ventriculomegaly.    Bones/joints: Normal. No acute fracture.    Sinuses: Normal as visualized. No acute sinusitis.    Mastoid air cells: Normal as visualized. No mastoid effusion.    Soft tissues: Normal.       Impression:       No acute infarct, hemorrhage or mass    THIS DOCUMENT HAS BEEN ELECTRONICALLY SIGNED BY JOYA REBOLLEDO MD    CT Chest Without Contrast [[id number]] Collected:  11/17/18 0144     Updated:  11/17/18 0244    Narrative:       EXAM:  CT Chest Without Contrast    EXAM DATE/TIME:  11/17/2018 1:44 AM    CLINICAL HISTORY:  68 years old, male; Pain; Chest pain; Additional info:   Abdominal pain, fever, SOA    TECHNIQUE:  Axial computed tomography images of the chest without intravenous   contrast.  All CT scans at this facility use at least one of these dose optimization   techniques: automated exposure control; mA and/or kV adjustment per patient   size (includes targeted exams where dose is matched to clinical indication); or   iterative reconstruction.  Coronal reformatted images were created and reviewed.    COMPARISON:  CR XR CHEST 1 VW 11/17/2018 12:07 AM    FINDINGS:    Limited evaluation without IV contrast.    Thoracic aorta is tortuous without focal aneurysm or dissection.   No pleural effusion or pneumothorax.   No enlarged lymph nodes.   Pulmonary vascular and interstitial pattern does not suggest active failure.   No suspicious lung mass or air space process.   No central endobronchial lesion.    Bony structures show no acute fracture or destructive process.         Impression:       Unremarkable noncontrast CT of the chest.     THIS DOCUMENT HAS BEEN ELECTRONICALLY SIGNED BY DANETTE TODD MD    CT Abdomen Pelvis Without Contrast [[id number]] Collected:  11/17/18 0143     Updated:  11/17/18 0240    Narrative:       EXAM:    CT Abdomen and Pelvis Without Intravenous Contrast     EXAM DATE/TIME:    11/17/2018 1:43 AM     CLINICAL HISTORY:    68 years old, male; Pain; Abdominal pain; Generalized; Prior surgery; Patient   HX: Alan has had sepsis several times in the past 2/2 a malignant neoplasm of   the head of his pancreas blocking off his bile duct. He has a HX of copd, hld,   HTN, dm, a-fib, and cad; Additional info: Abdominal pain, fever, SOA     TECHNIQUE:    Axial computed tomography images of the abdomen and pelvis without intravenous   contrast.    All CT scans at this facility use at least one of these dose optimization   techniques: automated exposure control; mA and/or kV adjustment per patient   size (includes targeted exams where dose is matched to clinical indication); or   iterative reconstruction.    Coronal reformatted images were created and reviewed.     COMPARISON:    CT ABDOMEN PELVIS STONE PROTOCOL 8/13/2018 7:19 AM     FINDINGS:    Lower thorax:  No suspicious mass or airspace process in the visualized lung   bases.     ABDOMEN:    Liver:  Noncontrast liver shows no obvious lesion. Pneumobilia is consistent   with prior sphincterotomy.    Gallbladder and bile ducts:  Gallbladder is surgically absent. Biliary stent   extends from the distal common bile duct through the pancreatic head and   ampulla level.    Pancreas:  Pancreatic body and tail are unremarkable for noncontrast CT   fullness in the pancreatic head with peripancreatic stranding suggests a mass.   The stent is new.    Spleen:  Noncontrast spleen shows no obvious focal deformity.    Adrenals:  Adrenal glands are normal in  appearance.    Kidneys and ureters:  Kidneys show no stone or hydronephrosis.    Stomach and bowel:  No evidence of small bowel obstruction. No evidence of   acute diverticulitis. Moderate pattern of colonic stool is present.    Appendix:  Normal caliber appendix is identified, with no adjacent   inflammation.     PELVIS:    Bladder:  Bladder appears normal.    Reproductive: Unremarkable as visualized.     ABDOMEN and PELVIS:    Intraperitoneal space:  No pneumoperitoneum.    Bones/joints:  Degenerative changes are seen in the lumbar spine with disc   height loss.    Soft tissues: Unremarkable.    Vasculature:  Atherosclerotic change present in the aorta, without aneurysm.    Lymph nodes: Normal. No enlarged lymph nodes.    Other findings:  Limited evaluation without enteric or IV contrast.       Impression:       1. Interval pancreatic stent extending from the common bile duct into the   ampulla without significant bile duct dilatation. Underlying apparent   pancreatic head mass.   2. Small hiatal hernia which can be associated with abdominal pain in the   setting of GE reflux     THIS DOCUMENT HAS BEEN ELECTRONICALLY SIGNED BY DANETTE TODD MD    XR Chest 1 View [[id number]] Collected:  11/16/18 2338     Updated:  11/17/18 0109    Narrative:       EXAM:    XR Chest, 1 View    CLINICAL HISTORY:    68 years old, male; Signs and symptoms; Fever; Prior surgery; Surgery date:   1-6 months; Surgery type: Port for chemo; Additional info: Chest pain triage   protocol    TECHNIQUE:    Frontal view of the chest.    COMPARISON:    CR XR CHEST 1 VW 10/17/2018 4:12 PM    FINDINGS:    Lungs: No acute lobar consolidation.    Pleural space:  Unremarkable.  No pneumothorax.    Heart:  Unremarkable.  No cardiomegaly.    Mediastinum:  Ovoid collection of air projecting over the left lung base, new   from prior exam, suspect air in transverse colon or gastric hiatal hernia.    Bones/joints:  Unremarkable.    Vasculature:  Tortuous  aorta.    Tubes, lines and devices:  New right-sided chest port.      Impression:       1.  Ovoid collection of air projecting over the left lung base, new from prior   exam, suspect air in transverse colon or gastric hiatal hernia.  2.  New right-sided chest port.    THIS DOCUMENT HAS BEEN ELECTRONICALLY SIGNED BY WINDY JOHNSON MD        Results for orders placed during the hospital encounter of 10/22/17   Adult Transthoracic Echo Complete W/ Cont if Necessary Per Protocol    Narrative · Left ventricular systolic function is normal. Estimated EF = 60%.  · The cardiac valves are anatomically and functionally normal.          Assessment/Plan   Assessment / Plan     Active Hospital Problems    Diagnosis   • **Altered mental status   • Thrombocytopenia (CMS/HCC)   • Anemia due to chemotherapy   • Elevated liver enzymes   • Malignant neoplasm of head of pancreas (CMS/HCC)     Added automatically from request for surgery [id number]     • Paroxysmal atrial fibrillation   • DM2 (diabetes mellitus, type 2) (CMS/HCC)   • Gastroesophageal reflux disease without esophagitis   • Essential hypertension   • COPD (chronic obstructive pulmonary disease) (CMS/HCC)     Mr. Phillips is a 69 yo WM w/ PMH of Afib on chronic anticoagulation with Eliquis, T2DM, HTN, COPD, recent diagnosis of pancreatic cancer currently undergoing chemo (completed cycle 1 of gemcitabine and abraxane) and recent admissions for ESBL Ecoli bacteremia (August) and Klebsiella bacteremia (October) who presents with encephalopathy and subjective fevers. Pt's LFTs were also found to be elevated on admission.     Assessment & Plan:  --Encephalopathy- suspect toxic due to recent change in opioid pain medication (from MS Contin and Oxycodone to Fentanyl patch).  Will d/c Fentanyl patch and resume prior regimen.  Pt afebrile with no leukocytosis and all infectious workup thus far is unremarkable. Will defer ABX for time being. If he were to become febrile and/or have a  leukocytosis, then would start ABX and ask ID to see again. Most likely source would be biliary given above history.   --TCP-mild, monitor, likely chemo induced.  --Anemia- monitor and transfuse PRN Hgb<8   --PAF- continue rhythm controlling agents and Eliquis. Pt unable to tolerate his beta blocker recently due to bradycardia, so will hold Metoprolol for time being  --T2DM- hold oral hypoglycemics, SSI for now  --GERD- continue formulary equivalent of PPI  --HTN- continue home antihypertensives  --h/o COPD-not on supplemental O2 at baseline.     DVT prophylaxis: anticoagulated    CODE STATUS:    Code Status and Medical Interventions:   Ordered at: 11/17/18 1050     Level Of Support Discussed With:    Patient     Code Status:    CPR     Medical Interventions (Level of Support Prior to Arrest):    Full       Admission Status:  I believe this patient meets INPATIENT status due to the need for care which can only be reasonably provided in an hospital setting such as aggressive/expedited ancillary services and/or consultation services, the necessity for IV medications, close physician monitoring and/or the possible need for procedures.  In such, I feel patient’s risk for adverse outcomes and need for care warrant INPATIENT evaluation and predict the patient’s care encounter to likely last beyond 2 midnights.    Electronically signed by Martha Luna MD, 11/17/18, 10:53 AM.

## 2018-11-18 VITALS
SYSTOLIC BLOOD PRESSURE: 126 MMHG | BODY MASS INDEX: 26.49 KG/M2 | HEART RATE: 79 BPM | TEMPERATURE: 98 F | DIASTOLIC BLOOD PRESSURE: 80 MMHG | WEIGHT: 195.6 LBS | HEIGHT: 72 IN | OXYGEN SATURATION: 95 % | RESPIRATION RATE: 16 BRPM

## 2018-11-18 LAB
ALBUMIN SERPL-MCNC: 3.22 G/DL (ref 3.2–4.8)
ALBUMIN/GLOB SERPL: 1.6 G/DL (ref 1.5–2.5)
ALP SERPL-CCNC: 174 U/L (ref 25–100)
ALT SERPL W P-5'-P-CCNC: 151 U/L (ref 7–40)
ANION GAP SERPL CALCULATED.3IONS-SCNC: 5 MMOL/L (ref 3–11)
ARTICHOKE IGE QN: 52 MG/DL (ref 0–130)
AST SERPL-CCNC: 64 U/L (ref 0–33)
BILIRUB SERPL-MCNC: 0.6 MG/DL (ref 0.3–1.2)
BUN BLD-MCNC: 13 MG/DL (ref 9–23)
BUN/CREAT SERPL: 12.4 (ref 7–25)
CALCIUM SPEC-SCNC: 8.7 MG/DL (ref 8.7–10.4)
CHLORIDE SERPL-SCNC: 107 MMOL/L (ref 99–109)
CHOLEST SERPL-MCNC: 108 MG/DL (ref 0–200)
CO2 SERPL-SCNC: 27 MMOL/L (ref 20–31)
CREAT BLD-MCNC: 1.05 MG/DL (ref 0.6–1.3)
DEPRECATED RDW RBC AUTO: 43.7 FL (ref 37–54)
ERYTHROCYTE [DISTWIDTH] IN BLOOD BY AUTOMATED COUNT: 13.8 % (ref 11.3–14.5)
GFR SERPL CREATININE-BSD FRML MDRD: 70 ML/MIN/1.73
GLOBULIN UR ELPH-MCNC: 2 GM/DL
GLUCOSE BLD-MCNC: 108 MG/DL (ref 70–100)
GLUCOSE BLDC GLUCOMTR-MCNC: 111 MG/DL (ref 70–130)
GLUCOSE BLDC GLUCOMTR-MCNC: 116 MG/DL (ref 70–130)
HBA1C MFR BLD: 6.4 % (ref 4.8–5.6)
HCT VFR BLD AUTO: 29.1 % (ref 38.9–50.9)
HDLC SERPL-MCNC: 43 MG/DL (ref 40–60)
HGB BLD-MCNC: 9.5 G/DL (ref 13.1–17.5)
MCH RBC QN AUTO: 29.1 PG (ref 27–31)
MCHC RBC AUTO-ENTMCNC: 32.6 G/DL (ref 32–36)
MCV RBC AUTO: 89.3 FL (ref 80–99)
PLATELET # BLD AUTO: 136 10*3/MM3 (ref 150–450)
PMV BLD AUTO: 9.1 FL (ref 6–12)
POTASSIUM BLD-SCNC: 4.6 MMOL/L (ref 3.5–5.5)
PROT SERPL-MCNC: 5.2 G/DL (ref 5.7–8.2)
RBC # BLD AUTO: 3.26 10*6/MM3 (ref 4.2–5.76)
SODIUM BLD-SCNC: 139 MMOL/L (ref 132–146)
TRIGL SERPL-MCNC: 87 MG/DL (ref 0–150)
TSH SERPL DL<=0.05 MIU/L-ACNC: 1.5 MIU/ML (ref 0.35–5.35)
WBC NRBC COR # BLD: 3.14 10*3/MM3 (ref 3.5–10.8)

## 2018-11-18 PROCEDURE — G0378 HOSPITAL OBSERVATION PER HR: HCPCS

## 2018-11-18 PROCEDURE — 96376 TX/PRO/DX INJ SAME DRUG ADON: CPT

## 2018-11-18 PROCEDURE — 84443 ASSAY THYROID STIM HORMONE: CPT | Performed by: INTERNAL MEDICINE

## 2018-11-18 PROCEDURE — 80053 COMPREHEN METABOLIC PANEL: CPT | Performed by: INTERNAL MEDICINE

## 2018-11-18 PROCEDURE — 82962 GLUCOSE BLOOD TEST: CPT

## 2018-11-18 PROCEDURE — 83036 HEMOGLOBIN GLYCOSYLATED A1C: CPT | Performed by: INTERNAL MEDICINE

## 2018-11-18 PROCEDURE — 63710000001 PROCHLORPERAZINE MALEATE PER 5 MG: Performed by: INTERNAL MEDICINE

## 2018-11-18 PROCEDURE — 99217 PR OBSERVATION CARE DISCHARGE MANAGEMENT: CPT | Performed by: INTERNAL MEDICINE

## 2018-11-18 PROCEDURE — 25010000002 ONDANSETRON PER 1 MG: Performed by: INTERNAL MEDICINE

## 2018-11-18 PROCEDURE — 96361 HYDRATE IV INFUSION ADD-ON: CPT

## 2018-11-18 PROCEDURE — 63710000001 DRONABINOL PER 2.5 MG: Performed by: INTERNAL MEDICINE

## 2018-11-18 PROCEDURE — 85027 COMPLETE CBC AUTOMATED: CPT | Performed by: INTERNAL MEDICINE

## 2018-11-18 PROCEDURE — 80061 LIPID PANEL: CPT | Performed by: INTERNAL MEDICINE

## 2018-11-18 RX ORDER — MORPHINE SULFATE 30 MG/1
30 TABLET, FILM COATED, EXTENDED RELEASE ORAL EVERY 12 HOURS SCHEDULED
Qty: 16 TABLET | Refills: 0
Start: 2018-11-18 | End: 2018-11-27 | Stop reason: SDUPTHER

## 2018-11-18 RX ORDER — ALLOPURINOL 100 MG/1
100 TABLET ORAL DAILY
COMMUNITY
End: 2018-12-26 | Stop reason: SDUPTHER

## 2018-11-18 RX ADMIN — SODIUM CHLORIDE 125 ML/HR: 9 INJECTION, SOLUTION INTRAVENOUS at 05:45

## 2018-11-18 RX ADMIN — LORAZEPAM 0.5 MG: 0.5 TABLET ORAL at 09:39

## 2018-11-18 RX ADMIN — MORPHINE SULFATE 30 MG: 30 TABLET, EXTENDED RELEASE ORAL at 09:39

## 2018-11-18 RX ADMIN — OXYCODONE HYDROCHLORIDE 5 MG: 5 TABLET ORAL at 06:13

## 2018-11-18 RX ADMIN — ONDANSETRON 8 MG: 4 TABLET, FILM COATED ORAL at 11:32

## 2018-11-18 RX ADMIN — FLECAINIDE ACETATE 50 MG: 50 TABLET ORAL at 11:32

## 2018-11-18 RX ADMIN — PANTOPRAZOLE SODIUM 40 MG: 40 TABLET, DELAYED RELEASE ORAL at 06:13

## 2018-11-18 RX ADMIN — DRONABINOL 5 MG: 2.5 CAPSULE ORAL at 09:39

## 2018-11-18 RX ADMIN — APIXABAN 5 MG: 5 TABLET, FILM COATED ORAL at 11:32

## 2018-11-18 RX ADMIN — ONDANSETRON 4 MG: 2 INJECTION INTRAMUSCULAR; INTRAVENOUS at 06:12

## 2018-11-18 RX ADMIN — PROCHLORPERAZINE MALEATE 5 MG: 5 TABLET, FILM COATED ORAL at 12:26

## 2018-11-18 RX ADMIN — OXYCODONE HYDROCHLORIDE 5 MG: 5 TABLET ORAL at 09:57

## 2018-11-18 NOTE — PLAN OF CARE
Problem: Patient Care Overview  Goal: Plan of Care Review  Outcome: Ongoing (interventions implemented as appropriate)   11/18/18 0500   Coping/Psychosocial   Plan of Care Reviewed With patient;family   Plan of Care Review   Progress improving   OTHER   Outcome Summary Pt's vitals stable, c/o abdominal pain initally - scheduled McContin given with PO Zofran. Pt slept majority of the night.        Problem: Fall Risk (Adult)  Goal: Identify Related Risk Factors and Signs and Symptoms  Outcome: Ongoing (interventions implemented as appropriate)   11/17/18 0636   Fall Risk (Adult)   Related Risk Factors (Fall Risk) fatigue/slow reaction;history of falls;polypharmacy;environment unfamiliar   Signs and Symptoms (Fall Risk) presence of risk factors     Goal: Absence of Fall  Outcome: Ongoing (interventions implemented as appropriate)   11/18/18 0500   Fall Risk (Adult)   Absence of Fall making progress toward outcome       Problem: Nausea/Vomiting (Adult)  Goal: Identify Related Risk Factors and Signs and Symptoms  Outcome: Ongoing (interventions implemented as appropriate)   11/17/18 0636   Nausea/Vomiting (Adult)   Related Risk Factors (Nausea/Vomiting) chemotherapy treatment   Signs and Symptoms (Nausea/Vomiting) abdominal discomfort/pain;report of emesis;weakness     Goal: Symptom Relief  Outcome: Ongoing (interventions implemented as appropriate)   11/18/18 0500   Nausea/Vomiting (Adult)   Symptom Relief making progress toward outcome     Goal: Adequate Hydration  Outcome: Ongoing (interventions implemented as appropriate)   11/18/18 0500   Nausea/Vomiting (Adult)   Adequate Hydration making progress toward outcome       Problem: Pain, Acute (Adult)  Goal: Identify Related Risk Factors and Signs and Symptoms  Outcome: Ongoing (interventions implemented as appropriate)   11/17/18 0636   Pain, Acute (Adult)   Related Risk Factors (Acute Pain) disease process;persistent pain   Signs and Symptoms (Acute Pain)  fatigue/weakness;facial mask of pain/grimace;nausea/vomiting/anorexia;verbalization of pain descriptors     Goal: Acceptable Pain Control/Comfort Level  Outcome: Ongoing (interventions implemented as appropriate)   11/18/18 0500   Pain, Acute (Adult)   Acceptable Pain Control/Comfort Level making progress toward outcome       Problem: Skin Injury Risk (Adult)  Goal: Identify Related Risk Factors and Signs and Symptoms  Outcome: Ongoing (interventions implemented as appropriate)   11/18/18 0500   Skin Injury Risk (Adult)   Related Risk Factors (Skin Injury Risk) mobility impaired;nutritional deficiencies     Goal: Skin Health and Integrity  Outcome: Ongoing (interventions implemented as appropriate)   11/18/18 0500   Skin Injury Risk (Adult)   Skin Health and Integrity making progress toward outcome

## 2018-11-18 NOTE — DISCHARGE SUMMARY
[facility] Medicine Services  DISCHARGE SUMMARY    Patient Name: Todd Phillips  : 1950  MRN: [id number]    Date of Admission: 2018  Date of Discharge:  2018  Primary Care Physician: Adiel Denney MD    Consults     No orders found from 10/18/2018 to 2018.        Hospital Course     Presenting Problem:   Altered mental status, unspecified altered mental status type [R41.82]  Altered mental status, unspecified altered mental status type [R41.82]  Altered mental status, unspecified altered mental status type [R41.82]    Active Hospital Problems    Diagnosis Date Noted   • **Altered mental status [R41.82] 2018     Priority: High   • Thrombocytopenia (CMS/HCC) [D69.6] 2018     Priority: Medium   • Anemia due to chemotherapy [D64.81, T45.1X5A] 2018     Priority: Medium   • Elevated liver enzymes [R74.8] 2018     Priority: Medium   • Malignant neoplasm of head of pancreas (CMS/HCC) [C25.0] 2018     Priority: Medium   • Paroxysmal atrial fibrillation [I48.91] 10/22/2017     Priority: Medium   • DM2 (diabetes mellitus, type 2) (CMS/HCC) [E11.9] 10/22/2017     Priority: Low   • Gastroesophageal reflux disease without esophagitis [K21.9] 2017     Priority: Low   • Essential hypertension [I10] 2016     Priority: Low   • COPD (chronic obstructive pulmonary disease) (CMS/HCC) [J44.9] 2016     Priority: Low      Resolved Hospital Problems   No resolved problems to display.          Hospital Course:  Todd Phillips is a 68 y.o. male w/ PMH of Afib on chronic anticoagulation with Eliquis, T2DM, HTN, COPD, recent diagnosis of pancreatic cancer currently undergoing chemo (completed cycle 1 of gemcitabine and abraxane) and recent admissions for ESBL Ecoli bacteremia (August) and Klebsiella bacteremia (October) who presents with encephalopathy and subjective fevers. Pt's LFTs were also found to be elevated on admission. Pt was  admitted to our service and observed overnight.  Upon review, it seemed that his pain medications had recently been adjusted by Oncology and this seemed to correspond with his change in mental status. He had no signs of infection- he has remained afebrile and without leukocytosis. UA was negative, blood cultures are NGTD and imaging showed no active infection (CXR, CT A/P). I put him back on his previous regimen of MS Contin and Oxycodone and stopped the Fentanyl. He has done well and is now back to his baseline mental status.      In terms of his LFTs, which were mildly elevated on admission, his CT A/P showed a patent biliary stent and his enzymes have trended down today- I suspect they were mildly elevated due to chemo.              Discharge Follow Up Recommendations for labs/diagnostics:  With PCP in one week  Day of Discharge     HPI:   Doing well this am, no issues overnight other than intermittent nausea.     Review of Systems  Gen- No fevers, chills  CV- No chest pain, palpitations  Resp- No cough, dyspnea  GI- No N/V/D, abd pain    Otherwise ROS is negative except as mentioned in the HPI.    Vital Signs:   Temp:  [98 °F (36.7 °C)-98.6 °F (37 °C)] 98 °F (36.7 °C)  Heart Rate:  [75-86] 79  Resp:  [16] 16  BP: (121-146)/(80-92) 126/80     Physical Exam:  Constitutional: No acute distress, awake, alert  Eyes: PERRLA, sclerae anicteric, no conjunctival injection  HENT: NCAT, mucous membranes dry  Neck: Supple, no thyromegaly, no lymphadenopathy, trachea midline  Respiratory: Clear to auscultation bilaterally, nonlabored respirations   Cardiovascular: RRR, no murmurs, rubs, or gallops, palpable pedal pulses bilaterally  Gastrointestinal: Positive bowel sounds, soft, nontender, nondistended  Musculoskeletal: No bilateral ankle edema, no clubbing or cyanosis to extremities  Psychiatric: flat affect, cooperative  Neurologic: Oriented x 3, more alert today, strength symmetric in all extremities, Cranial Nerves  grossly intact to confrontation, speech clear  Skin: No rashes       Pertinent  and/or Most Recent Results     Results from last 7 days   Lab Units  11/18/18   0519  11/17/18   0026  11/13/18   0920   WBC 10*3/mm3  3.14*  3.73  4.31*   HEMOGLOBIN g/dL  9.5*  10.2*  10.4*   HEMATOCRIT %  29.1*  30.1*  31.2*   PLATELETS 10*3/mm3  136*  130*  257   SODIUM mmol/L  139  134  137   POTASSIUM mmol/L  4.6  4.0  3.5   CHLORIDE mmol/L  107  106  102   CO2 mmol/L  27.0  24.0  27.5   BUN mg/dL  13  20  20   CREATININE mg/dL  1.05  0.90  1.17   GLUCOSE mg/dL  108*  122*  162*   CALCIUM mg/dL  8.7  8.6*  8.9     Results from last 7 days   Lab Units  11/18/18   0519  11/17/18   0026  11/13/18   0920   BILIRUBIN mg/dL  0.6  1.4*  0.3   ALK PHOS U/L  174*  242*  94   ALT (SGPT) U/L  151*  278*  32   AST (SGOT) U/L  64*  192*  28     Results from last 7 days   Lab Units  11/18/18   0519   CHOLESTEROL mg/dL  108   TRIGLYCERIDES mg/dL  87   HDL CHOL mg/dL  43     Results from last 7 days   Lab Units  11/18/18   0519  11/17/18   0026   TSH mIU/mL  1.502   --    HEMOGLOBIN A1C %  6.40*   --    BNP pg/mL   --   62.0     Brief Urine Lab Results  (Last result in the past 365 days)      Color   Clarity   Blood   Leuk Est   Nitrite   Protein   CREAT   Urine HCG        11/17/18 0222 Yellow Clear Negative Negative Negative Negative               Microbiology Results Abnormal     Procedure Component Value - Date/Time    Blood Culture - Blood, Arm, Right [[id number]] Collected:  11/17/18 0032    Lab Status:  Preliminary result Specimen:  Blood from Arm, Right Updated:  11/18/18 0045     Blood Culture No growth at 24 hours    Blood Culture - Blood, Arm, Right [[id number]] Collected:  11/17/18 0037    Lab Status:  Preliminary result Specimen:  Blood from Arm, Right Updated:  11/18/18 0045     Blood Culture No growth at 24 hours          Imaging Results (all)     Procedure Component Value Units Date/Time    CT Head Without Contrast [[id number]]  Collected:  11/17/18 0414     Updated:  11/17/18 0518    Narrative:       EXAM:    CT Head Without Intravenous Contrast     EXAM DATE/TIME:    11/17/2018 4:14 AM     CLINICAL HISTORY:    68 years old, male; Altered mental status, unspecified; Other specified   personal risk factors, not elsewhere classified; Signs and symptoms; Altered   mental status/memory loss; Additional info: AMS     TECHNIQUE:    Axial computed tomography images of the head/brain without intravenous   contrast.    All CT scans at this facility use at least one of these dose optimization   techniques: automated exposure control; mA and/or kV adjustment per patient   size (includes targeted exams where dose is matched to clinical indication); or   iterative reconstruction.     COMPARISON:    No relevant prior studies available.     FINDINGS:    Brain:  No hemorrhage. No significant white matter disease. No edema.    Ventricles: Normal. No ventriculomegaly.    Bones/joints: Normal. No acute fracture.    Sinuses: Normal as visualized. No acute sinusitis.    Mastoid air cells: Normal as visualized. No mastoid effusion.    Soft tissues: Normal.       Impression:       No acute infarct, hemorrhage or mass    THIS DOCUMENT HAS BEEN ELECTRONICALLY SIGNED BY JOYA REBOLLEDO MD    CT Chest Without Contrast [[id number]] Collected:  11/17/18 0144     Updated:  11/17/18 0244    Narrative:       EXAM:  CT Chest Without Contrast    EXAM DATE/TIME:  11/17/2018 1:44 AM    CLINICAL HISTORY:  68 years old, male; Pain; Chest pain; Additional info:   Abdominal pain, fever, SOA    TECHNIQUE:  Axial computed tomography images of the chest without intravenous   contrast.  All CT scans at this facility use at least one of these dose optimization   techniques: automated exposure control; mA and/or kV adjustment per patient   size (includes targeted exams where dose is matched to clinical indication); or   iterative reconstruction.  Coronal reformatted images were created  and reviewed.    COMPARISON:  CR XR CHEST 1 VW 11/17/2018 12:07 AM    FINDINGS:    Limited evaluation without IV contrast.    Thoracic aorta is tortuous without focal aneurysm or dissection.   No pleural effusion or pneumothorax.   No enlarged lymph nodes.   Pulmonary vascular and interstitial pattern does not suggest active failure.   No suspicious lung mass or air space process.   No central endobronchial lesion.   Bony structures show no acute fracture or destructive process.         Impression:       Unremarkable noncontrast CT of the chest.     THIS DOCUMENT HAS BEEN ELECTRONICALLY SIGNED BY DANETTE TODD MD    CT Abdomen Pelvis Without Contrast [[id number]] Collected:  11/17/18 0143     Updated:  11/17/18 0240    Narrative:       EXAM:    CT Abdomen and Pelvis Without Intravenous Contrast     EXAM DATE/TIME:    11/17/2018 1:43 AM     CLINICAL HISTORY:    68 years old, male; Pain; Abdominal pain; Generalized; Prior surgery; Patient   HX: Alan has had sepsis several times in the past 2/2 a malignant neoplasm of   the head of his pancreas blocking off his bile duct. He has a HX of copd, hld,   HTN, dm, a-fib, and cad; Additional info: Abdominal pain, fever, SOA     TECHNIQUE:    Axial computed tomography images of the abdomen and pelvis without intravenous   contrast.    All CT scans at this facility use at least one of these dose optimization   techniques: automated exposure control; mA and/or kV adjustment per patient   size (includes targeted exams where dose is matched to clinical indication); or   iterative reconstruction.    Coronal reformatted images were created and reviewed.     COMPARISON:    CT ABDOMEN PELVIS STONE PROTOCOL 8/13/2018 7:19 AM     FINDINGS:    Lower thorax:  No suspicious mass or airspace process in the visualized lung   bases.     ABDOMEN:    Liver:  Noncontrast liver shows no obvious lesion. Pneumobilia is consistent   with prior sphincterotomy.    Gallbladder and bile ducts:   Gallbladder is surgically absent. Biliary stent   extends from the distal common bile duct through the pancreatic head and   ampulla level.    Pancreas:  Pancreatic body and tail are unremarkable for noncontrast CT   fullness in the pancreatic head with peripancreatic stranding suggests a mass.   The stent is new.    Spleen:  Noncontrast spleen shows no obvious focal deformity.    Adrenals:  Adrenal glands are normal in appearance.    Kidneys and ureters:  Kidneys show no stone or hydronephrosis.    Stomach and bowel:  No evidence of small bowel obstruction. No evidence of   acute diverticulitis. Moderate pattern of colonic stool is present.    Appendix:  Normal caliber appendix is identified, with no adjacent   inflammation.     PELVIS:    Bladder:  Bladder appears normal.    Reproductive: Unremarkable as visualized.     ABDOMEN and PELVIS:    Intraperitoneal space:  No pneumoperitoneum.    Bones/joints:  Degenerative changes are seen in the lumbar spine with disc   height loss.    Soft tissues: Unremarkable.    Vasculature:  Atherosclerotic change present in the aorta, without aneurysm.    Lymph nodes: Normal. No enlarged lymph nodes.    Other findings:  Limited evaluation without enteric or IV contrast.       Impression:       1. Interval pancreatic stent extending from the common bile duct into the   ampulla without significant bile duct dilatation. Underlying apparent   pancreatic head mass.   2. Small hiatal hernia which can be associated with abdominal pain in the   setting of GE reflux     THIS DOCUMENT HAS BEEN ELECTRONICALLY SIGNED BY DANETTE TODD MD    XR Chest 1 View [[id number]] Collected:  11/16/18 2338     Updated:  11/17/18 0109    Narrative:       EXAM:    XR Chest, 1 View    CLINICAL HISTORY:    68 years old, male; Signs and symptoms; Fever; Prior surgery; Surgery date:   1-6 months; Surgery type: Port for chemo; Additional info: Chest pain triage   protocol    TECHNIQUE:    Frontal view of the  chest.    COMPARISON:    CR XR CHEST 1 VW 10/17/2018 4:12 PM    FINDINGS:    Lungs: No acute lobar consolidation.    Pleural space:  Unremarkable.  No pneumothorax.    Heart:  Unremarkable.  No cardiomegaly.    Mediastinum:  Ovoid collection of air projecting over the left lung base, new   from prior exam, suspect air in transverse colon or gastric hiatal hernia.    Bones/joints:  Unremarkable.    Vasculature:  Tortuous aorta.    Tubes, lines and devices:  New right-sided chest port.      Impression:       1.  Ovoid collection of air projecting over the left lung base, new from prior   exam, suspect air in transverse colon or gastric hiatal hernia.  2.  New right-sided chest port.    THIS DOCUMENT HAS BEEN ELECTRONICALLY SIGNED BY WINDY JOHNSON MD                    Results for orders placed during the hospital encounter of 10/22/17   Adult Transthoracic Echo Complete W/ Cont if Necessary Per Protocol    Narrative · Left ventricular systolic function is normal. Estimated EF = 60%.  · The cardiac valves are anatomically and functionally normal.           Order Current Status    Blood Culture - Blood, Arm, Right Preliminary result    Blood Culture - Blood, Arm, Right Preliminary result        Discharge Details        Discharge Medications      New Medications      Instructions Start Date   Morphine 30 MG 12 hr tablet  Commonly known as:  MS CONTIN   30 mg, Oral, Every 12 Hours Scheduled         Changes to Medications      Instructions Start Date   losartan 100 MG tablet  Commonly known as:  COZAAR  What changed:  how much to take   100 mg, Oral, Daily         Continue These Medications      Instructions Start Date   albuterol 108 (90 Base) MCG/ACT inhaler  Commonly known as:  PROVENTIL HFA;VENTOLIN HFA   2 puffs, Inhalation, Every 4 Hours PRN      allopurinol 100 MG tablet  Commonly known as:  ZYLOPRIM   100 mg, Oral, Daily      apixaban 5 MG tablet tablet  Commonly known as:  ELIQUIS   5 mg, Oral, Every 12 Hours  Scheduled      cholecalciferol 1000 units tablet  Commonly known as:  VITAMIN D3   1,000 Units, Oral, Daily      colchicine 0.6 MG tablet   0.6 mg, Oral, Daily      dronabinol 5 MG capsule  Commonly known as:  MARINOL   5 mg, Oral, 2 Times Daily Before Meals      flecainide 50 MG tablet  Commonly known as:  TAMBOCOR   50 mg, Oral, Every 12 Hours Scheduled      FLUoxetine 20 MG capsule  Commonly known as:  PROzac   20 mg, Oral, Nightly      granisetron 3.1 MG/24HR  Commonly known as:  SANCUSO   1 patch, Transdermal, Every 7 Days      lansoprazole 30 MG capsule  Commonly known as:  PREVACID   30 mg, Oral, Daily      lidocaine-prilocaine 2.5-2.5 % cream  Commonly known as:  EMLA   Topical, Every 2 Hours PRN      linaclotide 145 MCG capsule capsule  Commonly known as:  LINZESS   145 mcg, Oral, Every Morning Before Breakfast      LORazepam 0.5 MG tablet  Commonly known as:  ATIVAN   0.5 mg, Oral, Every 8 Hours PRN      metoprolol tartrate 50 MG tablet  Commonly known as:  LOPRESSOR   25 mg, Oral, As Needed      mometasone 220 MCG/INH inhaler  Commonly known as:  ASMANEX   2 puffs, Inhalation, Nightly      montelukast 10 MG tablet  Commonly known as:  SINGULAIR   10 mg, Oral, Daily      nitroglycerin 0.4 MG SL tablet  Commonly known as:  NITROSTAT   0.4 mg, Sublingual, As Needed      ondansetron 8 MG tablet  Commonly known as:  ZOFRAN   8 mg, Oral, Every 8 Hours PRN      oxyCODONE 5 MG immediate release tablet  Commonly known as:  ROXICODONE   Take 1-3 tabs every 4 hours as needed for pain      pioglitazone 30 MG tablet  Commonly known as:  ACTOS   30 mg, Oral, Daily      polyethylene glycol packet  Commonly known as:  MIRALAX   17 g, Oral, Daily PRN      predniSONE 10 MG tablet  Commonly known as:  DELTASONE   Take 4 tabs x 2 days, 3 tabs x 2 days, 2 tabs x 2 days, 1 tab x 2 days, then stop      prochlorperazine 10 MG tablet  Commonly known as:  COMPAZINE   10 mg, Oral, Every 6 Hours PRN      sodium chloride 0.9 %  solution   1,000 mL, Intravenous, Daily, To be given over 4 hours       sore muscle 10-30 % cream cream   1 application, Topical, 3 Times Daily PRN      tiotropium bromide-olodaterol 2.5-2.5 MCG/ACT aerosol solution inhaler  Commonly known as:  STIOLTO RESPIMAT   2 puffs, Inhalation, Daily         Stop These Medications    fentaNYL 25 MCG/HR patch  Commonly known as:  DURAGESIC              Discharge Disposition:  Home or Self Care    Discharge Diet:      Discharge Activity:           Special Instructions:    Code Status/Level of Support:  Code Status and Medical Interventions:   Ordered at: 11/17/18 1050     Level Of Support Discussed With:    Patient     Code Status:    CPR     Medical Interventions (Level of Support Prior to Arrest):    Full       Future Appointments   Date Time Provider Department Center   11/20/2018  1:00 PM [facility] NURSE [facility]   11/20/2018  1:30 PM Laura Jackman APRN [facility]   11/27/2018 11:30 AM [facility]   11/27/2018 11:30 AM  [facility] OP INFUS CHAIR 15  [facility]   11/27/2018  2:00 PM Gwen Alves MD [facility]   12/19/2018 11:00 AM Adiel Denney MD [facility] None   2/5/2019  2:30 PM Jefry Luna MD [facility] None   6/21/2019 10:15 AM Rj Perez MD [facility] None       Additional Instructions for the Follow-ups that You Need to Schedule     Discharge Follow-up with PCP   As directed       Currently Documented PCP:    Adiel Denney MD    PCP Phone Number:    [phone]     Follow Up Details:  in one week with PCP               Time Spent on Discharge:  35 minutes    Electronically signed by Martha Luna MD, 11/18/18, 10:45 AM.

## 2018-11-19 ENCOUNTER — READMISSION MANAGEMENT (OUTPATIENT)
Dept: CALL CENTER | Facility: HOSPITAL | Age: 68
End: 2018-11-19

## 2018-11-19 NOTE — OUTREACH NOTE
Prep Survey      Responses   Facility patient discharged from?  [facility]   Is patient eligible?  Yes   Discharge diagnosis  Altered Mental Status due to chemo and pain meds, pancreatic ca   Does the patient have one of the following disease processes/diagnoses(primary or secondary)?  Other   Does the patient have Home health ordered?  No   Is there a DME ordered?  No   Prep survey completed?  Yes          hSanika Sanders RN

## 2018-11-20 ENCOUNTER — OFFICE VISIT (OUTPATIENT)
Dept: ONCOLOGY | Facility: CLINIC | Age: 68
End: 2018-11-20

## 2018-11-20 ENCOUNTER — APPOINTMENT (OUTPATIENT)
Dept: ONCOLOGY | Facility: HOSPITAL | Age: 68
End: 2018-11-20
Attending: INTERNAL MEDICINE

## 2018-11-20 ENCOUNTER — DOCUMENTATION (OUTPATIENT)
Dept: ONCOLOGY | Facility: HOSPITAL | Age: 68
End: 2018-11-20

## 2018-11-20 ENCOUNTER — INFUSION (OUTPATIENT)
Dept: ONCOLOGY | Facility: HOSPITAL | Age: 68
End: 2018-11-20
Attending: INTERNAL MEDICINE

## 2018-11-20 ENCOUNTER — LAB (OUTPATIENT)
Dept: ONCOLOGY | Facility: CLINIC | Age: 68
End: 2018-11-20

## 2018-11-20 VITALS
TEMPERATURE: 97.2 F | BODY MASS INDEX: 26.62 KG/M2 | WEIGHT: 196.3 LBS | RESPIRATION RATE: 18 BRPM | OXYGEN SATURATION: 94 % | SYSTOLIC BLOOD PRESSURE: 72 MMHG | DIASTOLIC BLOOD PRESSURE: 54 MMHG | HEART RATE: 92 BPM

## 2018-11-20 VITALS
BODY MASS INDEX: 26.62 KG/M2 | RESPIRATION RATE: 18 BRPM | OXYGEN SATURATION: 94 % | SYSTOLIC BLOOD PRESSURE: 72 MMHG | WEIGHT: 196.3 LBS | TEMPERATURE: 97.2 F | HEART RATE: 92 BPM | DIASTOLIC BLOOD PRESSURE: 54 MMHG

## 2018-11-20 DIAGNOSIS — C25.0 MALIGNANT NEOPLASM OF HEAD OF PANCREAS (HCC): Primary | ICD-10-CM

## 2018-11-20 DIAGNOSIS — G89.3 NEOPLASM RELATED PAIN: ICD-10-CM

## 2018-11-20 DIAGNOSIS — R11.0 NAUSEA: ICD-10-CM

## 2018-11-20 DIAGNOSIS — C25.0 MALIGNANT NEOPLASM OF HEAD OF PANCREAS (HCC): ICD-10-CM

## 2018-11-20 DIAGNOSIS — E63.9 POOR NUTRITION: ICD-10-CM

## 2018-11-20 DIAGNOSIS — K59.00 CONSTIPATION, UNSPECIFIED CONSTIPATION TYPE: ICD-10-CM

## 2018-11-20 DIAGNOSIS — I48.91 ATRIAL FIBRILLATION, UNSPECIFIED TYPE (HCC): ICD-10-CM

## 2018-11-20 DIAGNOSIS — E86.0 DEHYDRATION: ICD-10-CM

## 2018-11-20 LAB
ALBUMIN SERPL-MCNC: 4.1 G/DL (ref 3.4–4.8)
ALBUMIN/GLOB SERPL: 1.5 G/DL (ref 1.5–2.5)
ALP SERPL-CCNC: 206 U/L (ref 40–129)
ALT SERPL W P-5'-P-CCNC: 105 U/L (ref 10–44)
ANION GAP SERPL CALCULATED.3IONS-SCNC: 5.9 MMOL/L (ref 3.6–11.2)
AST SERPL-CCNC: 53 U/L (ref 10–34)
BASOPHILS # BLD AUTO: 0.01 10*3/MM3 (ref 0–0.3)
BASOPHILS NFR BLD AUTO: 0.2 % (ref 0–2)
BILIRUB SERPL-MCNC: 0.8 MG/DL (ref 0.2–1.8)
BUN BLD-MCNC: 15 MG/DL (ref 7–21)
BUN/CREAT SERPL: 11.3 (ref 7–25)
CALCIUM SPEC-SCNC: 9.1 MG/DL (ref 7.7–10)
CHLORIDE SERPL-SCNC: 102 MMOL/L (ref 99–112)
CO2 SERPL-SCNC: 26.1 MMOL/L (ref 24.3–31.9)
CREAT BLD-MCNC: 1.33 MG/DL (ref 0.43–1.29)
DEPRECATED RDW RBC AUTO: 42.4 FL (ref 37–54)
EOSINOPHIL # BLD AUTO: 0 10*3/MM3 (ref 0–0.7)
EOSINOPHIL NFR BLD AUTO: 0 % (ref 0–7)
ERYTHROCYTE [DISTWIDTH] IN BLOOD BY AUTOMATED COUNT: 13.9 % (ref 11.5–14.5)
GFR SERPL CREATININE-BSD FRML MDRD: 53 ML/MIN/1.73
GLOBULIN UR ELPH-MCNC: 2.7 GM/DL
GLUCOSE BLD-MCNC: 131 MG/DL (ref 70–110)
HCT VFR BLD AUTO: 32.5 % (ref 42–52)
HGB BLD-MCNC: 10.9 G/DL (ref 14–18)
IMM GRANULOCYTES # BLD: 0.09 10*3/MM3 (ref 0–0.03)
IMM GRANULOCYTES NFR BLD: 2 % (ref 0–0.5)
LYMPHOCYTES # BLD AUTO: 1.13 10*3/MM3 (ref 1–3)
LYMPHOCYTES NFR BLD AUTO: 25.3 % (ref 16–46)
MCH RBC QN AUTO: 29.1 PG (ref 27–33)
MCHC RBC AUTO-ENTMCNC: 33.5 G/DL (ref 33–37)
MCV RBC AUTO: 86.7 FL (ref 80–94)
MONOCYTES # BLD AUTO: 0.48 10*3/MM3 (ref 0.1–0.9)
MONOCYTES NFR BLD AUTO: 10.8 % (ref 0–12)
NEUTROPHILS # BLD AUTO: 2.75 10*3/MM3 (ref 1.4–6.5)
NEUTROPHILS NFR BLD AUTO: 61.7 % (ref 40–75)
OSMOLALITY SERPL CALC.SUM OF ELEC: 270.9 MOSM/KG (ref 273–305)
PLATELET # BLD AUTO: 102 10*3/MM3 (ref 130–400)
PMV BLD AUTO: 9.5 FL (ref 6–10)
POTASSIUM BLD-SCNC: 3.9 MMOL/L (ref 3.5–5.3)
PROT SERPL-MCNC: 6.8 G/DL (ref 6–8)
RBC # BLD AUTO: 3.75 10*6/MM3 (ref 4.7–6.1)
SODIUM BLD-SCNC: 134 MMOL/L (ref 135–153)
WBC NRBC COR # BLD: 4.46 10*3/MM3 (ref 4.5–12.5)

## 2018-11-20 PROCEDURE — 36415 COLL VENOUS BLD VENIPUNCTURE: CPT

## 2018-11-20 PROCEDURE — 96360 HYDRATION IV INFUSION INIT: CPT | Performed by: NURSE PRACTITIONER

## 2018-11-20 PROCEDURE — 80053 COMPREHEN METABOLIC PANEL: CPT | Performed by: INTERNAL MEDICINE

## 2018-11-20 PROCEDURE — 99215 OFFICE O/P EST HI 40 MIN: CPT | Performed by: NURSE PRACTITIONER

## 2018-11-20 PROCEDURE — 85025 COMPLETE CBC W/AUTO DIFF WBC: CPT | Performed by: INTERNAL MEDICINE

## 2018-11-20 RX ORDER — SODIUM CHLORIDE 0.9 % (FLUSH) 0.9 %
10 SYRINGE (ML) INJECTION AS NEEDED
Status: CANCELLED | OUTPATIENT
Start: 2018-11-27

## 2018-11-20 RX ORDER — SODIUM CHLORIDE 0.9 % (FLUSH) 0.9 %
10 SYRINGE (ML) INJECTION AS NEEDED
Status: DISCONTINUED | OUTPATIENT
Start: 2018-11-20 | End: 2018-11-20 | Stop reason: HOSPADM

## 2018-11-20 RX ORDER — DRONABINOL 5 MG/1
5 CAPSULE ORAL
Qty: 60 CAPSULE | Refills: 2 | OUTPATIENT
Start: 2018-11-20 | End: 2019-02-20

## 2018-11-20 RX ORDER — SODIUM CHLORIDE 9 MG/ML
1000 INJECTION, SOLUTION INTRAVENOUS ONCE
Status: COMPLETED | OUTPATIENT
Start: 2018-11-20 | End: 2018-11-20

## 2018-11-20 RX ORDER — LORAZEPAM 0.5 MG/1
TABLET ORAL
Qty: 120 TABLET | Refills: 0 | OUTPATIENT
Start: 2018-11-20 | End: 2019-02-01 | Stop reason: HOSPADM

## 2018-11-20 RX ADMIN — SODIUM CHLORIDE, PRESERVATIVE FREE 10 ML: 5 INJECTION INTRAVENOUS at 16:00

## 2018-11-20 RX ADMIN — SODIUM CHLORIDE, PRESERVATIVE FREE 500 UNITS: 5 INJECTION INTRAVENOUS at 16:00

## 2018-11-20 RX ADMIN — SODIUM CHLORIDE 1000 ML: 9 INJECTION, SOLUTION INTRAVENOUS at 14:55

## 2018-11-20 NOTE — PROGRESS NOTES
DATE : 11/20/18    DIAGNOSIS:   1.  Pancreatic cancer    2.  Repeated episodes of bacteremia - Klebsiella pneumoniae in August 2018, ESBL E coli in September 2018    CHIEF COMPLAINT:  Follow up of pancreatic cancer and toxicity check    TREATMENT:  1.       HISTORY OF PRESENT ILLNESS:   Todd Phillips is a very pleasant 68 y.o. male who is being seen today at the request of Dr. Miquel Brody for evaluation and treatment of pancreatic cancer.  Mr. Phillips initially developed symptoms in January of 2018. At that time he had pain in his upper abdomen which would radiate to his back.  In July, the pain intensified.  He was seen in the ER and also by Dr. Su.  He underwent RUQ U/S and then HIDA scan and it was determined he had a dysfunctional gallbladder.  He saw Dr. Downs and had cholecystectomy on 8-10-18.  Pathology showed chronic cholecystitis and an incidental benign lymph node.  He re-presented in the post-operative period with jaundice.  Dr. Su performed ERCP on8-14-18.  He was noted to have a 3 cm irregular tight stricture of the distal common bile duct with proximal biliary ductal dilatation.  Biliary papillotomy and stricture dilatation performed and brushings taken.  A 10 Fr x 5 cm biliary stent was placed.  Brushings were taken but were negative.  There was sl dilatation of the distal pancreatic duct without discrete stricture.   He was discharged with improving jaundice on 8-16.  On 8-21 he represented with sepsis due to Klebsiella pneumonia bacteremia and was admitted.  He then was referred to Dr. Brody for evaluation and treatment.  He has been set up for EUS with biopsy next Friday for what appears to be a primary pancreatic malignancy in the head of the pancreas.  Dr. Brody has referred him for consideration of neoadjuvant therapy.    INTERVAL HISTORY:  Mr. Phillips is here today with his daughter for follow up of pancreatic cancer and toxicity check. He has been receiving treatment with  Gemzar/Abraxane and completed his first cycle last week. Patient has been struggling with ongoing fatigue, weakness, poor appetite, uncontrolled nausea and dehydration. Currently he is on Sancuso patch, taking Marinol 5 mg BID, scheduled compazine and Ativan every 4 hours and continues to have nausea. He is also receiving Emend with treatment. As a result, he has been unable to eat. His daughter says he tried sipping on Ensure and began vomiting. She says he has vomited three times today. At last OV, Dr. Alves tried changing his MSContin to Fentanyl patch to see if this would help with his symptoms. However, his daughter says he began having worsening confusion and mental status changes and they took him to ER in [place]. He was admitted to [facility] for encephalopathy, dehydration and subjective fevers. He received IVF and infectious workup was unremarkable. They discontinued Fentanyl patch and started him back on MSContin 30 mg BID and his daughter says his confusion resolved. At present he says his pain is controlled. He is also taking oxycodone prn. Patient says he is absolutely miserable.     PAST MEDICAL HISTORY:  Past Medical History:   Diagnosis Date   • Antral gastritis    • Asthma    • Atrial fibrillation (CMS/HCC)     treated with oral blood thinner   • Chest pain    • COPD (chronic obstructive pulmonary disease) (CMS/HCC)    • Coronary artery disease     no stents   • Diabetes mellitus (CMS/HCC)     type 2    • Duodenitis    • Elevated cholesterol    • Emphysema of lung (CMS/HCC)    • Gallbladder disease     removed    • GERD (gastroesophageal reflux disease)    • Hyperlipidemia    • Hypertension    • Jaundice    • Kidney stone    • Kidney stones     had lithotripsy and passed 36 kidney stones as well as had one surgically removed.   • Malignant neoplasm of head of pancreas (CMS/HCC) 9/5/2018   • Palpitations    • Pneumonia 08/2018   • Reflux esophagitis    • Renal failure     stage 3 kidney  disease   • Sepsis (CMS/HCC)        PAST SURGICAL HISTORY:  Past Surgical History:   Procedure Laterality Date   • BILE DUCT STENT PLACEMENT      [facility] 2 weeks ago    • CARDIAC CATHETERIZATION  1999   • CARDIOVASCULAR STRESS TEST  2012   • COLONOSCOPY     • CYSTOSCOPY BLADDER STONE LITHOTRIPSY     • ECHO - CONVERTED  2012   • KIDNEY STONE SURGERY     • KIDNEY STONE SURGERY      open abdominal surgery   • UPPER GASTROINTESTINAL ENDOSCOPY  2012       FAMILY HISTORY:  Family History   Problem Relation Age of Onset   • Heart attack Mother    • Heart disease Mother    • Stroke Mother    • Kidney disease Mother    • Heart failure Mother    • Lung disease Father    • Tuberculosis Father    • Diabetes Sister    • Heart attack Brother    • Heart failure Brother    • Heart disease Brother    • Diabetes Sister    • No Known Problems Brother    • No Known Problems Brother        SOCIAL HISTORY:  Social History     Socioeconomic History   • Marital status:      Spouse name: Not on file   • Number of children: Not on file   • Years of education: Not on file   • Highest education level: Not on file   Social Needs   • Financial resource strain: Not on file   • Food insecurity - worry: Not on file   • Food insecurity - inability: Not on file   • Transportation needs - medical: Not on file   • Transportation needs - non-medical: Not on file   Occupational History   • Not on file   Tobacco Use   • Smoking status: Never Smoker   • Smokeless tobacco: Never Used   Substance and Sexual Activity   • Alcohol use: No   • Drug use: No   • Sexual activity: Defer     Partners: Female   Other Topics Concern   • Not on file   Social History Narrative    He is  and lives alone with his wife although they have 3 children together who all live close by. He is retired from the [employer] and was exposed to Agent Orange during [place]. He is a lifelong nonsmoker.         A DIL  of  cancer.      REVIEW OF SYSTEMS:   A comprehensive 14 point review of systems was performed.  Significant findings as mentioned above.  All other systems reviewed and are negative.      MEDICATIONS:  The current medication list was reviewed in the EMR    Current Outpatient Medications:   •  albuterol (PROVENTIL HFA;VENTOLIN HFA) 108 (90 BASE) MCG/ACT inhaler, Inhale 2 puffs Every 4 (Four) Hours As Needed for Wheezing or Shortness of Air., Disp: , Rfl:   •  allopurinol (ZYLOPRIM) 100 MG tablet, Take 100 mg by mouth Daily., Disp: , Rfl:   •  apixaban (ELIQUIS) 5 MG tablet tablet, Take 1 tablet by mouth Every 12 (Twelve) Hours., Disp: 60 tablet, Rfl: 0  •  cholecalciferol (VITAMIN D3) 1000 units tablet, Take 1,000 Units by mouth Daily., Disp: , Rfl:   •  colchicine 0.6 MG tablet, Take 1 tablet by mouth Daily., Disp: 90 tablet, Rfl: 2  •  dronabinol (MARINOL) 5 MG capsule, Take 1 capsule by mouth 2 (Two) Times a Day Before Meals., Disp: 60 capsule, Rfl: 0  •  flecainide (TAMBOCOR) 50 MG tablet, Take 1 tablet by mouth Every 12 (Twelve) Hours., Disp: 60 tablet, Rfl: 0  •  FLUoxetine (PROzac) 20 MG capsule, Take 20 mg by mouth Every Night., Disp: , Rfl:   •  granisetron (SANCUSO) 3.1 MG/24HR, Place 1 patch on the skin as directed by provider Every 7 (Seven) Days., Disp: 4 patch, Rfl: 3  •  lansoprazole (PREVACID) 30 MG capsule, Take 30 mg by mouth Daily., Disp: , Rfl:   •  lidocaine-prilocaine (EMLA) 2.5-2.5 % cream, Apply topically to the appropriate area as directed Every 2 (Two) Hours As Needed for Mild Pain ., Disp: 30 g, Rfl: 5  •  linaclotide (LINZESS) 145 MCG capsule capsule, Take 1 capsule by mouth Every Morning Before Breakfast., Disp: 30 capsule, Rfl: 3  •  LORazepam (ATIVAN) 0.5 MG tablet, Take 1 tablet by mouth Every 8 (Eight) Hours As Needed (for breakthrough nausea)., Disp: 60 tablet, Rfl: 0  •  losartan (COZAAR) 100 MG tablet, Take 1 tablet by mouth Daily. (Patient taking differently: Take 25 mg by mouth  Daily.), Disp: 90 tablet, Rfl: 3  •  metoprolol tartrate (LOPRESSOR) 50 MG tablet, Take 25 mg by mouth As Needed., Disp: , Rfl:   •  mometasone (ASMANEX) 220 MCG/INH inhaler, Inhale 2 puffs Every Night., Disp: , Rfl:   •  montelukast (SINGULAIR) 10 MG tablet, Take 10 mg by mouth Daily., Disp: , Rfl:   •  Morphine (MS CONTIN) 30 MG 12 hr tablet, Take 1 tablet by mouth Every 12 (Twelve) Hours for 16 doses., Disp: 16 tablet, Rfl: 0  •  nitroglycerin (NITROSTAT) 0.4 MG SL tablet, Place 0.4 mg under the tongue as needed for chest pain., Disp: , Rfl:   •  ondansetron (ZOFRAN) 8 MG tablet, Take 1 tablet by mouth Every 8 (Eight) Hours As Needed for Nausea or Vomiting., Disp: 90 tablet, Rfl: 6  •  oxyCODONE (ROXICODONE) 5 MG immediate release tablet, Take 1-3 tabs every 4 hours as needed for pain, Disp: 200 tablet, Rfl: 0  •  pioglitazone (ACTOS) 30 MG tablet, Take 30 mg by mouth Daily., Disp: , Rfl:   •  polyethylene glycol (MIRALAX) packet, Take 17 g by mouth Daily As Needed., Disp: , Rfl:   •  predniSONE (DELTASONE) 10 MG tablet, Take 4 tabs x 2 days, 3 tabs x 2 days, 2 tabs x 2 days, 1 tab x 2 days, then stop, Disp: 20 tablet, Rfl: 0  •  prochlorperazine (COMPAZINE) 10 MG tablet, Take 1 tablet by mouth Every 6 (Six) Hours As Needed for Nausea or Vomiting., Disp: 60 tablet, Rfl: 5  •  sodium chloride 0.9 % solution, Infuse 1,000 mL into a venous catheter Daily. To be given over 4 hours, Disp: , Rfl:   •  sore muscle (ICY HOT EXTRA STRENGTH) 10-30 % cream cream, Apply 1 application topically to the appropriate area as directed 3 (Three) Times a Day As Needed (local pain)., Disp: , Rfl:   •  Tiotropium Bromide-Olodaterol 2.5-2.5 MCG/ACT aerosol solution, Inhale 2 puffs Daily., Disp: , Rfl:   No current facility-administered medications for this visit.     Facility-Administered Medications Ordered in Other Visits:   •  sodium chloride 0.9 % infusion  - ADS Override Pull, , , ,   •  sodium chloride 0.9 % infusion  - ADS  Override Pull, , , ,     ALLERGIES:    Allergies   Allergen Reactions   • Contrast Dye Other (See Comments)     Can't have due to kidney failure per family       PHYSICAL EXAM:  Vitals:    18 1317   BP: (!) 72/54   Pulse: 92   Resp: 18   Temp: 97.2 °F (36.2 °C)   SpO2: 94%   General:  Awake, alert and oriented. Appears fatigued/weak  HEENT:  Pupils are equal, round and reactive to light and accommodation, Extra-ocular movements full, Oropharyx clear, mm dry  Neck:  No JVD, thyromegaly or lymphadenopathy  CV:  Regular rate/rhythm, no murmurs, rubs or gallops  Resp:  Lungs are clear to auscultation bilaterally  Abd:  Soft, somewhat tender to palpation bi over the epigastric and RUQ areas, non-distended, bowel sounds normal, no organomegaly or masses.  Surgical incisions well-healed.  Ext:  No clubbing, cyanosis or edema  Lymph:  No cervical, supraclavicular, axillary,adenopathy  Neuro: grossly non-focal exam    PATHOLOGY:  08-15-18         ENDOSCOPY:  18 ERCP with papillotomy, balloon rotation of the biliary stricture, pathology brushings, ileum stent placement (Georges)  1.  Irregular 3 cm distal common bile duct stricture concerning for neoplastic disease. Biliary papillotomy, stricture dilatation by  through the scope balloon, cytology brushings performed and 10 Wolof by 5 cm biliary stent placed.    2.  Dilated common hepatic duct to 15 mm when fully contrast filled.  Dilated intrahepatic biliary tree.    3.  No stones proximal to the stricture were identified.    4.  Slight dilatation of the distal pancreatic duct without a discrete stricture identified.    IMAGIN18 CT Abdomen Pelvis Stone Protocol   Findings  - LOWER THORAX: Clear. No effusions.  - LIVER: Homogeneous. No focal hepatic mass or ductal dilatation.  - GALLBLADDER: MINIMAL STRANDING AROUND REGION OF GALLBLADDER FOSSA  THAT MAY BE POSTSURGICAL.  - PANCREAS: Unremarkable. No mass or ductal dilatation.  - SPLEEN: Homogeneous. No  splenomegaly.  - ADRENALS: No mass.  - KIDNEYS: No mass. No obstructive uropathy.  No evidence of urolithiasis.  - GI TRACT: SMALL HIATAL HERNIA.  - PERITONEUM: No free air. No free fluid or loculated fluid collections.  - MESENTERY: Unremarkable.  - LYMPH NODES: No lymphadenopathy.  - VASCULATURE: ATHEROSCLEROTIC VASCULAR CALCIFICATION.  - ABDOMINAL WALL: No focal hernia or mass.  - OTHER: None.  - BLADDER: No focal mass or significant wall thickening  - REPRODUCTIVE: Unremarkable as visualized.  - APPENDIX: Nondistended. No surrounding inflammation.  - BONES: No acute bony abnormality.     IMPRESSION:  - Minimal stranding around region of gallbladder fossa that may be postsurgical.     08-13-18 US Liver   FINDINGS:   - Visualized pancreas is unremarkable.   - The gallbladder is absent. No collection identified.   - The CBD measures 10.19639061776 mm    .  - The liver demonstrates normal echogenicity without focal lesion.    - No ascites demonstrated.        IMPRESSION:  - As above.    08-22-18 CT Abdomen Pelvis Stone Protocol   FINDINGS:   - Minimal bibasilar atelectasis.  - Hiatal hernia.  - The liver is homogeneous. There is no evidence of focal hepatic mass.  - The spleen is homogeneous.  - Stent is noted traversing the common bile duct.  - 3 mm nodule in the right lung on image 8 of the axial series.  - There is no adrenal enlargement.  - The kidneys show no evidence of hydronephrosis or hydroureter. I do not see any distal ureteral stones.   - Otherwise I do not see any free fluid or walled off fluid collections.  - There is moderate to large volume stool in the colon.  - There is no evidence of mesenteric or retroperitoneal adenopathy.     IMPRESSION:  1. Tiny nodule in the right lung base.  2. Minimal bibasilar atelectasis.  3. Moderate to large volume stool in the colon.     Other findings as above.    08-22-18 CT Abdomen Pelvis With Contrast   FINDINGS:   - Tiny left basilar nodule measuring 4 mm on image  14 of the axial series.  - Minimal bibasilar atelectasis.  - The liver is homogeneous. There is no evidence of focal hepatic mass  - The spleen is homogeneous  - Indistinct appearance of the pancreatic head. A biliary stent is present.  - Small left adrenal nodule is present.  - The kidneys show no evidence of hydronephrosis or hydroureter. I do not see any distal ureteral stones.   - Otherwise I do not see any free fluid or walled off fluid collections.  - Large volume stool is present in the colon.     IMPRESSION:  1. Slightly enlarged, indistinct appearance of the pancreatic head.  - Underlying neoplasm certainly not excluded but no delineable mass is present. There is a biliary stent.    2. Tiny nodule in the left lung base.    3. Small left adrenal nodule.    4. Hiatal hernia.    5. Moderate to large volume stool in the colon.      09-11-18 CT Chest Without Contrast   FINDINGS:   - Minimal coronary artery calcifications present.  - No pericardial or pleural effusions.  - No parenchymal soft tissue nodules or masses. There are findings of COPD.     IMPRESSION:  - COPD.  - Hiatal hernia.  - Minimal coronary artery calcification.       09-11-18 CT Abdomen Pelvis Without Contrast   FINDINGS:   - Lung bases show mild increased interstitial markings.  - There is a hiatal hernia.  - The liver is homogeneous. There is no evidence of focal hepatic mass.  - The spleen is homogeneous.  - Mildly indistinct appearance of the pancreas. There is a biliary stent present. I cannot exclude mild degree of pancreatitis..  - There is no adrenal enlargement.  - The kidneys show no evidence of hydronephrosis or hydroureter. I do not see any distal ureteral stones.   - Otherwise I do not see any free fluid or walled off fluid collections.  - Large volume stool is present in the colon.  - Urinary bladder wall is slightly thickened.  - There is no evidence of mesenteric or retroperitoneal adenopathy.    IMPRESSION:  1. Mildly indistinct  appearance of the proximal pancreas.  2. Urinary bladder wall thickening.  3. Large volume stool in the colon.    09-14-18 CT Abdomen Without Contrast   FINDINGS:   - Again noted is very mild indistinct appearance of the pancreatic head. Stent is stable in position.  - The liver is stable and homogeneous.  - Spleen is homogeneous.  - No adrenal enlargement.  - Moderate to large volume stool in the colon.     IMPRESSION:  - No significant interval change since the previous exam.     09-21-18 NM Pet Skull Base To Mid Thigh   FINDINGS:      HEAD/NECK:  - No FDG hypermetabolic neck adenopathy.  - No FDG hypermetabolic masses.     CHEST:   - No FDG hypermetabolic thoracic adenopathy.  - No FDG hypermetabolic lung nodules or masses.  - Evaluation for tiny parenchymal nodules is somewhat limited on low dose CT secondary to respiratory motion.     ABDOMEN/PELVIS:   - There is hypermetabolic activity in the pancreatic head with a maximum SUV of 4.971.  - Physiologic FDG hypermetabolism seen throughout the GI tract.  - Physiologic FDG hypermetabolism seen throughout the mesentery, retroperitoneum and pelvis.     BONES:   - There are scattered foci of FDG hypermetabolism most suggestive of degenerative change seen throughout the axial skeleton. If concern persist for metastatic lesions of the bone, HDP Bone scan is recommended for further evaluation.     IMPRESSION:  - Abnormal hypermetabolic activity corresponding to area of indistinctness in the pancreatic head. Maximum SUV is 4.97.    RECENT LABS:  Lab Results   Component Value Date    WBC 4.46 (L) 11/20/2018    HGB 10.9 (L) 11/20/2018    HCT 32.5 (L) 11/20/2018    MCV 86.7 11/20/2018    RDW 13.9 11/20/2018     (L) 11/20/2018    NEUTRORELPCT 61.7 11/20/2018    LYMPHORELPCT 25.3 11/20/2018    MONORELPCT 10.8 11/20/2018    EOSRELPCT 0.0 11/20/2018    BASORELPCT 0.2 11/20/2018    NEUTROABS 2.75 11/20/2018    LYMPHSABS 1.13 11/20/2018       Lab Results   Component Value  Date     (L) 11/20/2018    K 3.9 11/20/2018    CO2 26.1 11/20/2018     11/20/2018    BUN 15 11/20/2018    CREATININE 1.33 (H) 11/20/2018    EGFRIFNONA 53 (L) 11/20/2018    EGFRIFAFRI  09/02/2016      Comment:      <15 Indicative of kidney failure.    GLUCOSE 131 (H) 11/20/2018    CALCIUM 9.1 11/20/2018    ALKPHOS 206 (H) 11/20/2018    AST 53 (H) 11/20/2018     (H) 11/20/2018    BILITOT 0.8 11/20/2018    ALBUMIN 4.10 11/20/2018    PROTEINTOT 6.8 11/20/2018    MG 1.7 11/01/2018       Lab Results   Component Value Date/Time    URICACID 7.5 (H) 10/16/2018 12:18 PM     Lab Results   Component Value Date     1576 (H) 11/13/2018     5149 (H) 10/19/2018     7602 (H) 09/25/2018     3636 (H) 09/07/2018     4032 (H) 08/15/2018     ASSESSMENT & PLAN:  Todd Phillips is a very pleasant 68 y.o. male with presumed cancer of the head of the pancreas with a malignant appearing stricture in the CBD.    1.  Pancreatic cancer:  -  Imaging shows vague abnormality in the head of the pancreas which is hypermetabolic on PET-CT.  Biopsy was c/w pancreatic cancer and Ca19-9 is markedly elevated.    -  Biliary stent was exchanged for metal stent to relieve obstruction / cholangitis.   -Recommended neoadjuvant chemotherapy with Gemcitabine and Abraxane and he has completed one cycle to date. He has had significant improvement in CA 19-9 as above.   - Unfortunately, he has been struggling with ongoing nausea/vomiting and dehydration. As a result, he has very poor nutrition and his symptoms have made it very difficult to treat him. Dr. Alves called and d/w Dr. Hale who has agreed to evaluate him in his clinic next week. In the meantime, he recommended we start him on TPN for at least one month. Will make arrangements for this. He will follow up with Dr. Alves next week as scheduled.  Will continue treatment and continue to work on symptom control.      2.  Nausea/Vomiting /  Dehydration/hypotension/Poor nutrition:  -Will give 1L of IVF in clinic today.   -  Continue Emend, Sancuso patch. Continue Marinol 5 mg BID, will refill today.  Continue Compazine and Ativan 0.5-1 mg scheduled every 4 hours for breakthough nausea. Will refill Ativan today.   -Arranging for TPN as above per Dr. Hale's recommendations. Will continue to closely monitor.     3.  Neoplasm related pain:  - Morphine 30 mg BID was changed to Fentanyl 25 mcg to see if this could be contributing to issue #2. However, he developed mental status changes and was admitted to [facility]. They changed this back to [facility] and his confusion has resolved. He continues to take Oxycodone 5 mg 1-3 tabs q 4h prn. At present, reports pain is controlled. Will monitor.     4. Constipation: Continue Senna/ Colace ii BID with Miralax as needed.      5.   History of Atrial fibrillation:  Chronically anticoagulated on Eliquis.     6.  Prophylaxis:  Has had 2018 influenza vaccine.      7.  ACO Quality measures  - Todd Phillips does not use tobacco products.  - Patient's Body mass index is 26.62 kg/m². BMI is above normal parameters. Recommendations include: none given current problems above. .  - Current outpatient and discharge medications have been reconciled for the patient.  Reviewed by:VIMAL Nielson      I spent 40 minutes with Todd Phillips today.  More than 50% of the time was spent in counseling / coordination of care for the above problems.      Electronically Signed by: VIMAL Rolle      CC:   MD Miquel Quintana MD Aaron House, MD Michael Simons, MD

## 2018-11-20 NOTE — PROGRESS NOTES
SS received order per Laura and Dr. Alves to arrange TPN for patient at home.  SS contacted dietary per Pam ext [phone] who states that pt will need 2022 calories, 89 grams protein, 250 mls lipids 20% three times a week, 75 mls hour clinimix, 20 dextrose, and 5 % amino acids.  SS contacted [facility] [phone] per Katherin to arrange TPN.  SS faxed [phone] face sheet, MD orders, labs, medications, and dictation notes.      [facility] to review patient's information and to call .    SS received call from [facility] who states being agreeable to arranging for patient at home as recommended by lamont Orozco.  [facility] states that pt will need glucometer and strips to check insulin level.  [facility] to contact pt's home health with lab orders needed to monitor pt's levels.      SS spoke with pt and pt's daughter Alena to make aware of arrangements.  Pt and daughter agreeable to arrangements being completed.  [facility] to deliver TPN and supplies to pt's home in a.m.     SS contacted [facility] on call [phone] per on call.  ROHIT Erickson returned call to  and states that she will follow up with home health in the morning for specific orders and labs.  SS provided personal cell phone in the event of any issues over the holiday weekend.  SS will follow as needed.

## 2018-11-21 ENCOUNTER — READMISSION MANAGEMENT (OUTPATIENT)
Dept: CALL CENTER | Facility: HOSPITAL | Age: 68
End: 2018-11-21

## 2018-11-21 ENCOUNTER — DOCUMENTATION (OUTPATIENT)
Dept: ONCOLOGY | Facility: HOSPITAL | Age: 68
End: 2018-11-21

## 2018-11-21 NOTE — OUTREACH NOTE
Medical Week 1 Survey      Responses   Facility patient discharged from?  [facility]   Does the patient have one of the following disease processes/diagnoses(primary or secondary)?  Other   Is there a successful TCM telephone encounter documented?  No   Week 1 attempt successful?  Yes   Call start time  1102   Rescheduled  Rescheduled-pt requested   Call end time  1103          Judi Fritz RN

## 2018-11-22 LAB
BACTERIA SPEC AEROBE CULT: NORMAL
BACTERIA SPEC AEROBE CULT: NORMAL

## 2018-11-24 ENCOUNTER — READMISSION MANAGEMENT (OUTPATIENT)
Dept: CALL CENTER | Facility: HOSPITAL | Age: 68
End: 2018-11-24

## 2018-11-24 NOTE — OUTREACH NOTE
Medical Week 1 Survey      Responses   Facility patient discharged from?  [facility]   Does the patient have one of the following disease processes/diagnoses(primary or secondary)?  Other   Is there a successful TCM telephone encounter documented?  No   Week 1 attempt successful?  Yes   Call start time  0937   Call end time  0944   Discharge diagnosis  Altered Mental Status due to chemo and pain meds, pancreatic ca   Is patient permission given to speak with other caregiver?  Yes   Person spoke with today (if not patient) and relationship  Wife   Medication alerts for this patient  Pt was started on Lipids continuous through his port.    Meds reviewed with patient/caregiver?  Yes   Is the patient having any side effects they believe may be caused by any medication additions or changes?  Yes   Side effects comments   constipation   Does the patient have all medications ordered at discharge?  Yes   Is the patient taking all medications as directed (includes completed medication regime)?  Yes   Does the patient have a primary care provider?   Yes   Does the patient have an appointment with their PCP within 7 days of discharge?  Yes   Has the patient kept scheduled appointments due by today?  Yes   What is the Home health agency?   [facility] in place.    Has home health visited the patient within 72 hours of discharge?  Yes   Home health comments  [facility] home infusion provides Lipids.    DME comments  Has all DME in place.    Psychosocial issues?  No   Comments  Pt having chemo weekly x3wks on then 1 wk off. Begins again 11-26. Decreased appetite. Confusion has improved, not back to baseline mentation.    Did the patient receive a copy of their discharge instructions?  Yes   Nursing interventions  Reviewed instructions with patient   What is the patient's perception of their health status since discharge?  Improving   Is the patient/caregiver able to teach back signs and symptoms related to disease process for when  to call PCP?  Yes   Is the patient/caregiver able to teach back signs and symptoms related to disease process for when to call 911?  Yes   Is the patient/caregiver able to teach back the hierarchy of who to call/visit for symptoms/problems? PCP, Specialist, Home health nurse, Urgent Care, ED, 911  Yes   Week 1 call completed?  Yes   Revoked  No further contact(revokes)-requires comment   Graduated/Revoked comments  Pt having freq oncology visits, chemo infusion.    Wrap up additional comments  .          Meena Enrique, RN

## 2018-11-25 DIAGNOSIS — C25.0 MALIGNANT NEOPLASM OF HEAD OF PANCREAS (HCC): ICD-10-CM

## 2018-11-25 RX ORDER — SODIUM CHLORIDE 9 MG/ML
250 INJECTION, SOLUTION INTRAVENOUS ONCE
Status: CANCELLED | OUTPATIENT
Start: 2019-05-13

## 2018-11-25 RX ORDER — PACLITAXEL 100 MG/20ML
125 INJECTION, POWDER, LYOPHILIZED, FOR SUSPENSION INTRAVENOUS ONCE
Status: CANCELLED | OUTPATIENT
Start: 2019-04-15

## 2018-11-25 RX ORDER — SODIUM CHLORIDE 9 MG/ML
250 INJECTION, SOLUTION INTRAVENOUS ONCE
Status: CANCELLED | OUTPATIENT
Start: 2019-04-22

## 2018-11-25 RX ORDER — SODIUM CHLORIDE 9 MG/ML
250 INJECTION, SOLUTION INTRAVENOUS ONCE
Status: CANCELLED | OUTPATIENT
Start: 2019-04-15

## 2018-11-25 RX ORDER — PACLITAXEL 100 MG/20ML
125 INJECTION, POWDER, LYOPHILIZED, FOR SUSPENSION INTRAVENOUS ONCE
Status: CANCELLED | OUTPATIENT
Start: 2019-05-20

## 2018-11-25 RX ORDER — SODIUM CHLORIDE 9 MG/ML
250 INJECTION, SOLUTION INTRAVENOUS ONCE
Status: CANCELLED | OUTPATIENT
Start: 2019-04-08

## 2018-11-25 RX ORDER — PACLITAXEL 100 MG/20ML
125 INJECTION, POWDER, LYOPHILIZED, FOR SUSPENSION INTRAVENOUS ONCE
Status: CANCELLED | OUTPATIENT
Start: 2019-05-27

## 2018-11-25 RX ORDER — SODIUM CHLORIDE 9 MG/ML
250 INJECTION, SOLUTION INTRAVENOUS ONCE
Status: CANCELLED | OUTPATIENT
Start: 2019-05-27

## 2018-11-25 RX ORDER — SODIUM CHLORIDE 9 MG/ML
250 INJECTION, SOLUTION INTRAVENOUS ONCE
Status: CANCELLED | OUTPATIENT
Start: 2019-05-20

## 2018-11-25 RX ORDER — PACLITAXEL 100 MG/20ML
125 INJECTION, POWDER, LYOPHILIZED, FOR SUSPENSION INTRAVENOUS ONCE
Status: CANCELLED | OUTPATIENT
Start: 2019-05-13

## 2018-11-25 RX ORDER — PACLITAXEL 100 MG/20ML
125 INJECTION, POWDER, LYOPHILIZED, FOR SUSPENSION INTRAVENOUS ONCE
Status: CANCELLED | OUTPATIENT
Start: 2019-04-08

## 2018-11-25 RX ORDER — PACLITAXEL 100 MG/20ML
125 INJECTION, POWDER, LYOPHILIZED, FOR SUSPENSION INTRAVENOUS ONCE
Status: CANCELLED | OUTPATIENT
Start: 2019-04-22

## 2018-11-27 ENCOUNTER — INFUSION (OUTPATIENT)
Dept: ONCOLOGY | Facility: HOSPITAL | Age: 68
End: 2018-11-27
Attending: INTERNAL MEDICINE

## 2018-11-27 ENCOUNTER — LAB (OUTPATIENT)
Dept: ONCOLOGY | Facility: CLINIC | Age: 68
End: 2018-11-27

## 2018-11-27 ENCOUNTER — APPOINTMENT (OUTPATIENT)
Dept: ONCOLOGY | Facility: HOSPITAL | Age: 68
End: 2018-11-27
Attending: INTERNAL MEDICINE

## 2018-11-27 ENCOUNTER — DOCUMENTATION (OUTPATIENT)
Dept: ONCOLOGY | Facility: HOSPITAL | Age: 68
End: 2018-11-27

## 2018-11-27 ENCOUNTER — OFFICE VISIT (OUTPATIENT)
Dept: ONCOLOGY | Facility: CLINIC | Age: 68
End: 2018-11-27

## 2018-11-27 VITALS
WEIGHT: 200 LBS | RESPIRATION RATE: 20 BRPM | OXYGEN SATURATION: 95 % | SYSTOLIC BLOOD PRESSURE: 110 MMHG | TEMPERATURE: 98.3 F | WEIGHT: 200 LBS | HEART RATE: 93 BPM | SYSTOLIC BLOOD PRESSURE: 110 MMHG | RESPIRATION RATE: 20 BRPM | HEART RATE: 93 BPM | TEMPERATURE: 98.3 F | BODY MASS INDEX: 27.12 KG/M2 | DIASTOLIC BLOOD PRESSURE: 68 MMHG | BODY MASS INDEX: 27.12 KG/M2 | OXYGEN SATURATION: 95 % | DIASTOLIC BLOOD PRESSURE: 68 MMHG

## 2018-11-27 DIAGNOSIS — R53.83 FATIGUE, UNSPECIFIED TYPE: ICD-10-CM

## 2018-11-27 DIAGNOSIS — C25.0 MALIGNANT NEOPLASM OF HEAD OF PANCREAS (HCC): ICD-10-CM

## 2018-11-27 DIAGNOSIS — G89.3 NEOPLASM RELATED PAIN: ICD-10-CM

## 2018-11-27 DIAGNOSIS — C25.0 MALIGNANT NEOPLASM OF HEAD OF PANCREAS (HCC): Primary | ICD-10-CM

## 2018-11-27 DIAGNOSIS — E63.9 POOR NUTRITION: ICD-10-CM

## 2018-11-27 DIAGNOSIS — R11.0 NAUSEA: ICD-10-CM

## 2018-11-27 DIAGNOSIS — R63.0 POOR APPETITE: ICD-10-CM

## 2018-11-27 LAB
ALBUMIN SERPL-MCNC: 3.6 G/DL (ref 3.4–4.8)
ALBUMIN/GLOB SERPL: 1.2 G/DL (ref 1.5–2.5)
ALP SERPL-CCNC: 134 U/L (ref 40–129)
ALT SERPL W P-5'-P-CCNC: 22 U/L (ref 10–44)
ANION GAP SERPL CALCULATED.3IONS-SCNC: 6 MMOL/L (ref 3.6–11.2)
AST SERPL-CCNC: 20 U/L (ref 10–34)
BILIRUB SERPL-MCNC: 0.4 MG/DL (ref 0.2–1.8)
BUN BLD-MCNC: 23 MG/DL (ref 7–21)
BUN/CREAT SERPL: 20.4 (ref 7–25)
CALCIUM SPEC-SCNC: 9.3 MG/DL (ref 7.7–10)
CHLORIDE SERPL-SCNC: 100 MMOL/L (ref 99–112)
CO2 SERPL-SCNC: 28 MMOL/L (ref 24.3–31.9)
CREAT BLD-MCNC: 1.13 MG/DL (ref 0.43–1.29)
DEPRECATED RDW RBC AUTO: 44.8 FL (ref 37–54)
EOSINOPHIL # BLD MANUAL: 0.12 10*3/MM3 (ref 0–0.7)
EOSINOPHIL NFR BLD MANUAL: 2 % (ref 0–7)
ERYTHROCYTE [DISTWIDTH] IN BLOOD BY AUTOMATED COUNT: 14.7 % (ref 11.5–14.5)
GFR SERPL CREATININE-BSD FRML MDRD: 64 ML/MIN/1.73
GLOBULIN UR ELPH-MCNC: 3 GM/DL
GLUCOSE BLD-MCNC: 147 MG/DL (ref 70–110)
HCT VFR BLD AUTO: 31.1 % (ref 42–52)
HGB BLD-MCNC: 10.2 G/DL (ref 14–18)
LYMPHOCYTES # BLD MANUAL: 0.96 10*3/MM3 (ref 1–3)
LYMPHOCYTES NFR BLD MANUAL: 14 % (ref 0–12)
LYMPHOCYTES NFR BLD MANUAL: 16 % (ref 16–46)
MCH RBC QN AUTO: 29.5 PG (ref 27–33)
MCHC RBC AUTO-ENTMCNC: 32.8 G/DL (ref 33–37)
MCV RBC AUTO: 89.9 FL (ref 80–94)
METAMYELOCYTES NFR BLD MANUAL: 1 % (ref 0–0)
MONOCYTES # BLD AUTO: 0.84 10*3/MM3 (ref 0.1–0.9)
MYELOCYTES NFR BLD MANUAL: 3 % (ref 0–0)
NEUTROPHILS # BLD AUTO: 3.78 10*3/MM3 (ref 1.4–6.5)
NEUTROPHILS NFR BLD MANUAL: 52 % (ref 40–75)
NEUTS BAND NFR BLD MANUAL: 11 % (ref 0–8)
OSMOLALITY SERPL CALC.SUM OF ELEC: 274.6 MOSM/KG (ref 273–305)
PLATELET # BLD AUTO: 525 10*3/MM3 (ref 130–400)
PMV BLD AUTO: 9.4 FL (ref 6–10)
POIKILOCYTOSIS BLD QL SMEAR: ABNORMAL
POTASSIUM BLD-SCNC: 4.4 MMOL/L (ref 3.5–5.3)
PROMYELOCYTES NFR BLD MANUAL: 1 % (ref 0–0)
PROT SERPL-MCNC: 6.6 G/DL (ref 6–8)
RBC # BLD AUTO: 3.46 10*6/MM3 (ref 4.7–6.1)
SCAN SLIDE: NORMAL
SMALL PLATELETS BLD QL SMEAR: ABNORMAL
SODIUM BLD-SCNC: 134 MMOL/L (ref 135–153)
WBC NRBC COR # BLD: 6 10*3/MM3 (ref 4.5–12.5)

## 2018-11-27 PROCEDURE — 85025 COMPLETE CBC W/AUTO DIFF WBC: CPT | Performed by: INTERNAL MEDICINE

## 2018-11-27 PROCEDURE — 80053 COMPREHEN METABOLIC PANEL: CPT | Performed by: INTERNAL MEDICINE

## 2018-11-27 PROCEDURE — 36415 COLL VENOUS BLD VENIPUNCTURE: CPT

## 2018-11-27 PROCEDURE — 85007 BL SMEAR W/DIFF WBC COUNT: CPT | Performed by: INTERNAL MEDICINE

## 2018-11-27 PROCEDURE — 99214 OFFICE O/P EST MOD 30 MIN: CPT | Performed by: INTERNAL MEDICINE

## 2018-11-27 RX ORDER — MORPHINE SULFATE 30 MG/1
30 TABLET, FILM COATED, EXTENDED RELEASE ORAL EVERY 12 HOURS
Qty: 60 TABLET | Refills: 0
Start: 2018-11-27 | End: 2018-11-27 | Stop reason: SDUPTHER

## 2018-11-27 RX ORDER — MORPHINE SULFATE 30 MG/1
30 TABLET, FILM COATED, EXTENDED RELEASE ORAL EVERY 12 HOURS
Qty: 60 TABLET | Refills: 0 | Status: SHIPPED | OUTPATIENT
Start: 2018-11-27 | End: 2019-02-01 | Stop reason: HOSPADM

## 2018-11-27 RX ORDER — PREDNISONE 10 MG/1
TABLET ORAL
Qty: 20 TABLET | Refills: 0 | Status: SHIPPED | OUTPATIENT
Start: 2018-11-27 | End: 2018-12-07

## 2018-11-27 NOTE — PROGRESS NOTES
SS contacted pt's spouse Meagan [phone] to make aware that TPN has been arranged with [facility] and will be delivered to pt's home today.  SS will follow as needed.

## 2018-11-27 NOTE — PROGRESS NOTES
SS spoke with pt and spouse this date.  Pt appears to be feeling badly today.  Spouse states that TPN has been going okay at home.  SS spoke with MD about getting new labs today due to pt receiving labwork from [facility] yesterday through port a cath.  Oncology MA received labs today via peripheral line.  SS will follow.

## 2018-11-28 ENCOUNTER — DOCUMENTATION (OUTPATIENT)
Dept: OTHER | Facility: HOSPITAL | Age: 68
End: 2018-11-28

## 2018-11-28 NOTE — PROGRESS NOTES
I saw patient with Dr. Brody today in [facility]. Patient comes to clinic in a wheelchair with his son. Patient following up with Dr. Brody. Patient has TPN infusion at this time. Patient was started on the TPN on last Wednesday. Dr. Brody is ordering a CT pancreatic protocol with IV contrast and CT chest. Dr. Brody looked up patient's creatinine level and it was 1.13 from yesterday. MD is ordering hydration before scan. Aletha is aware and is going to get pt's CTs on the same day as follow with Dr. Brody in 2 weeks. Patient agrees to have scans done at this time.

## 2018-11-29 ENCOUNTER — TRANSCRIBE ORDERS (OUTPATIENT)
Dept: ADMINISTRATIVE | Facility: HOSPITAL | Age: 68
End: 2018-11-29

## 2018-11-29 DIAGNOSIS — C25.0 MALIGNANT NEOPLASM OF HEAD OF PANCREAS (HCC): Primary | ICD-10-CM

## 2018-12-04 ENCOUNTER — OFFICE VISIT (OUTPATIENT)
Dept: ONCOLOGY | Facility: CLINIC | Age: 68
End: 2018-12-04

## 2018-12-04 ENCOUNTER — LAB (OUTPATIENT)
Dept: ONCOLOGY | Facility: CLINIC | Age: 68
End: 2018-12-04

## 2018-12-04 ENCOUNTER — INFUSION (OUTPATIENT)
Dept: ONCOLOGY | Facility: HOSPITAL | Age: 68
End: 2018-12-04
Attending: INTERNAL MEDICINE

## 2018-12-04 ENCOUNTER — DOCUMENTATION (OUTPATIENT)
Dept: OTHER | Facility: HOSPITAL | Age: 68
End: 2018-12-04

## 2018-12-04 VITALS
RESPIRATION RATE: 18 BRPM | WEIGHT: 194.8 LBS | DIASTOLIC BLOOD PRESSURE: 88 MMHG | TEMPERATURE: 97.6 F | SYSTOLIC BLOOD PRESSURE: 120 MMHG | HEART RATE: 96 BPM | OXYGEN SATURATION: 97 % | BODY MASS INDEX: 26.42 KG/M2

## 2018-12-04 DIAGNOSIS — G89.3 NEOPLASM RELATED PAIN: ICD-10-CM

## 2018-12-04 DIAGNOSIS — E63.9 POOR NUTRITION: ICD-10-CM

## 2018-12-04 DIAGNOSIS — R11.0 NAUSEA: ICD-10-CM

## 2018-12-04 DIAGNOSIS — K59.00 CONSTIPATION, UNSPECIFIED CONSTIPATION TYPE: ICD-10-CM

## 2018-12-04 DIAGNOSIS — E86.0 DEHYDRATION: ICD-10-CM

## 2018-12-04 DIAGNOSIS — C25.0 MALIGNANT NEOPLASM OF HEAD OF PANCREAS (HCC): ICD-10-CM

## 2018-12-04 DIAGNOSIS — I48.91 ATRIAL FIBRILLATION, UNSPECIFIED TYPE (HCC): ICD-10-CM

## 2018-12-04 DIAGNOSIS — C25.0 MALIGNANT NEOPLASM OF HEAD OF PANCREAS (HCC): Primary | ICD-10-CM

## 2018-12-04 LAB
ALBUMIN SERPL-MCNC: 4.2 G/DL (ref 3.4–4.8)
ALBUMIN/GLOB SERPL: 1.4 G/DL (ref 1.5–2.5)
ALP SERPL-CCNC: 118 U/L (ref 40–129)
ALT SERPL W P-5'-P-CCNC: 16 U/L (ref 10–44)
ANION GAP SERPL CALCULATED.3IONS-SCNC: 7.2 MMOL/L (ref 3.6–11.2)
ANISOCYTOSIS BLD QL: ABNORMAL
AST SERPL-CCNC: 21 U/L (ref 10–34)
BILIRUB SERPL-MCNC: 0.3 MG/DL (ref 0.2–1.8)
BUN BLD-MCNC: 36 MG/DL (ref 7–21)
BUN/CREAT SERPL: 26.3 (ref 7–25)
CALCIUM SPEC-SCNC: 9.8 MG/DL (ref 7.7–10)
CHLORIDE SERPL-SCNC: 100 MMOL/L (ref 99–112)
CO2 SERPL-SCNC: 28.8 MMOL/L (ref 24.3–31.9)
CREAT BLD-MCNC: 1.37 MG/DL (ref 0.43–1.29)
DEPRECATED RDW RBC AUTO: 49.3 FL (ref 37–54)
ERYTHROCYTE [DISTWIDTH] IN BLOOD BY AUTOMATED COUNT: 15.8 % (ref 11.5–14.5)
GFR SERPL CREATININE-BSD FRML MDRD: 51 ML/MIN/1.73
GLOBULIN UR ELPH-MCNC: 3.1 GM/DL
GLUCOSE BLD-MCNC: 128 MG/DL (ref 70–110)
HCT VFR BLD AUTO: 38.1 % (ref 42–52)
HGB BLD-MCNC: 12.5 G/DL (ref 14–18)
HYPOCHROMIA BLD QL: ABNORMAL
LYMPHOCYTES # BLD MANUAL: 2.39 10*3/MM3 (ref 1–3)
LYMPHOCYTES NFR BLD MANUAL: 15 % (ref 0–12)
LYMPHOCYTES NFR BLD MANUAL: 21 % (ref 16–46)
MCH RBC QN AUTO: 29.7 PG (ref 27–33)
MCHC RBC AUTO-ENTMCNC: 32.8 G/DL (ref 33–37)
MCV RBC AUTO: 90.5 FL (ref 80–94)
METAMYELOCYTES NFR BLD MANUAL: 1 % (ref 0–0)
MONOCYTES # BLD AUTO: 1.7 10*3/MM3 (ref 0.1–0.9)
MYELOCYTES NFR BLD MANUAL: 3 % (ref 0–0)
NEUTROPHILS # BLD AUTO: 6.82 10*3/MM3 (ref 1.4–6.5)
NEUTROPHILS NFR BLD MANUAL: 60 % (ref 40–75)
OSMOLALITY SERPL CALC.SUM OF ELEC: 281.9 MOSM/KG (ref 273–305)
PLATELET # BLD AUTO: 564 10*3/MM3 (ref 130–400)
PMV BLD AUTO: 9.3 FL (ref 6–10)
POTASSIUM BLD-SCNC: 4.1 MMOL/L (ref 3.5–5.3)
PROT SERPL-MCNC: 7.3 G/DL (ref 6–8)
RBC # BLD AUTO: 4.21 10*6/MM3 (ref 4.7–6.1)
SCAN SLIDE: NORMAL
SMALL PLATELETS BLD QL SMEAR: ABNORMAL
SODIUM BLD-SCNC: 136 MMOL/L (ref 135–153)
WBC NRBC COR # BLD: 11.36 10*3/MM3 (ref 4.5–12.5)

## 2018-12-04 PROCEDURE — 36415 COLL VENOUS BLD VENIPUNCTURE: CPT

## 2018-12-04 PROCEDURE — 80053 COMPREHEN METABOLIC PANEL: CPT | Performed by: INTERNAL MEDICINE

## 2018-12-04 PROCEDURE — 85007 BL SMEAR W/DIFF WBC COUNT: CPT | Performed by: INTERNAL MEDICINE

## 2018-12-04 PROCEDURE — 96361 HYDRATE IV INFUSION ADD-ON: CPT

## 2018-12-04 PROCEDURE — 85025 COMPLETE CBC W/AUTO DIFF WBC: CPT | Performed by: INTERNAL MEDICINE

## 2018-12-04 PROCEDURE — 99214 OFFICE O/P EST MOD 30 MIN: CPT | Performed by: NURSE PRACTITIONER

## 2018-12-04 PROCEDURE — 96360 HYDRATION IV INFUSION INIT: CPT

## 2018-12-04 RX ORDER — SODIUM CHLORIDE 9 MG/ML
500 INJECTION, SOLUTION INTRAVENOUS CONTINUOUS
Status: ACTIVE | OUTPATIENT
Start: 2018-12-04 | End: 2018-12-04

## 2018-12-04 RX ORDER — SODIUM CHLORIDE 9 MG/ML
INJECTION, SOLUTION INTRAVENOUS
Status: COMPLETED
Start: 2018-12-04 | End: 2018-12-04

## 2018-12-04 RX ADMIN — SODIUM CHLORIDE 1000 ML: 9 INJECTION, SOLUTION INTRAVENOUS at 13:10

## 2018-12-04 NOTE — PROGRESS NOTES
DATE : 12/04/18    DIAGNOSIS:   1.  Pancreatic cancer    2.  Repeated episodes of bacteremia due to cholangitis -    3.  Refractory nausea/vomiting    CHIEF COMPLAINT:  Follow up of pancreatic cancer    TREATMENT:  1.       HISTORY OF PRESENT ILLNESS:   Todd Phillips is a very pleasant 68 y.o. male who is being seen today at the request of Dr. Miquel Brody for evaluation and treatment of pancreatic cancer.  Mr. Phillips initially developed symptoms in January of 2018. At that time he had pain in his upper abdomen which would radiate to his back.  In July, the pain intensified.  He was seen in the ER and also by Dr. Su.  He underwent RUQ U/S and then HIDA scan and it was determined he had a dysfunctional gallbladder.  He saw Dr. Downs and had cholecystectomy on 8-10-18.  Pathology showed chronic cholecystitis and an incidental benign lymph node.  He re-presented in the post-operative period with jaundice.  Dr. Su performed ERCP on8-14-18.  He was noted to have a 3 cm irregular tight stricture of the distal common bile duct with proximal biliary ductal dilatation.  Biliary papillotomy and stricture dilatation performed and brushings taken.  A 10 Fr x 5 cm biliary stent was placed.  Brushings were taken but were negative.  There was sl dilatation of the distal pancreatic duct without discrete stricture.   He was discharged with improving jaundice on 8-16.  On 8-21 he represented with sepsis due to Klebsiella pneumonia bacteremia and was admitted.  He then was referred to Dr. Brody for evaluation and treatment.  He has been set up for EUS with biopsy next Friday for what appears to be a primary pancreatic malignancy in the head of the pancreas.  Dr. Brody has referred him for consideration of neoadjuvant therapy.    INTERVAL HISTORY:  Mr. Phillips is here today with his wife for follow up of pancreatic cancer. He has been receiving TPN for approximately two weeks and his wife says this has been a blessing for  him. He is slowly regaining strength and reports improvement in fatigue. He has also been trying to drink boost occasionally. However, he struggles to drink enough water. He says he drank very little last week but did a little better over weekend, drinking 2 bottles of water/day. He continues to take Marinol 5 mg BID. He continues to have nausea but overall this has improved. He is using Sancuso patch and taking Ativan 0.5 mg, 1-2 tabs every 4 hours along with compazine every 4 hours. He reports pain is controlled with MSContin 30 mg BID and rarely has to take oxycodone (typically 1-2 times/week). He followed up with Dr. Hale last week and says he wants him to continue with TPN and planning for repeat imaging and follow up with him next week. Based on imaging, will possibly schedule surgery at that time. He denies any other complaints at this time.      PAST MEDICAL HISTORY:  Past Medical History:   Diagnosis Date   • Antral gastritis    • Asthma    • Atrial fibrillation (CMS/HCC)     treated with oral blood thinner   • Chest pain    • COPD (chronic obstructive pulmonary disease) (CMS/HCC)    • Coronary artery disease     no stents   • Diabetes mellitus (CMS/HCC)     type 2    • Duodenitis    • Elevated cholesterol    • Emphysema of lung (CMS/HCC)    • Gallbladder disease     removed    • GERD (gastroesophageal reflux disease)    • Hyperlipidemia    • Hypertension    • Jaundice    • Kidney stone    • Kidney stones     had lithotripsy and passed 36 kidney stones as well as had one surgically removed.   • Malignant neoplasm of head of pancreas (CMS/HCC) 9/5/2018   • Palpitations    • Pneumonia 08/2018   • Reflux esophagitis    • Renal failure     stage 3 kidney disease   • Sepsis (CMS/HCC)        PAST SURGICAL HISTORY:  Past Surgical History:   Procedure Laterality Date   • BILE DUCT STENT PLACEMENT      [facility] 2 weeks ago    • CARDIAC CATHETERIZATION  11/01/1999   • CARDIOVASCULAR STRESS TEST   2012   • COLONOSCOPY     • CYSTOSCOPY BLADDER STONE LITHOTRIPSY     • ECHO - CONVERTED  2012   • KIDNEY STONE SURGERY     • KIDNEY STONE SURGERY      open abdominal surgery   • UPPER GASTROINTESTINAL ENDOSCOPY  2012       FAMILY HISTORY:  Family History   Problem Relation Age of Onset   • Heart attack Mother    • Heart disease Mother    • Stroke Mother    • Kidney disease Mother    • Heart failure Mother    • Lung disease Father    • Tuberculosis Father    • Diabetes Sister    • Heart attack Brother    • Heart failure Brother    • Heart disease Brother    • Diabetes Sister    • No Known Problems Brother    • No Known Problems Brother        SOCIAL HISTORY:  Social History     Socioeconomic History   • Marital status:      Spouse name: Not on file   • Number of children: Not on file   • Years of education: Not on file   • Highest education level: Not on file   Social Needs   • Financial resource strain: Not on file   • Food insecurity - worry: Not on file   • Food insecurity - inability: Not on file   • Transportation needs - medical: Not on file   • Transportation needs - non-medical: Not on file   Occupational History   • Not on file   Tobacco Use   • Smoking status: Never Smoker   • Smokeless tobacco: Never Used   Substance and Sexual Activity   • Alcohol use: No   • Drug use: No   • Sexual activity: Defer     Partners: Female   Other Topics Concern   • Not on file   Social History Narrative    He is  and lives alone with his wife although they have 3 children together who all live close by. He is retired from the [employer] and was exposed to Agent Orange during [place]. He is a lifelong nonsmoker.         A DIL  of cancer.    REVIEW OF SYSTEMS:   A comprehensive 14 point review of systems was performed.  Significant findings as mentioned above.  All other systems reviewed and are negative.      MEDICATIONS:  The current medication list was reviewed in the EMR    Current Outpatient  Medications:   •  albuterol (PROVENTIL HFA;VENTOLIN HFA) 108 (90 BASE) MCG/ACT inhaler, Inhale 2 puffs Every 4 (Four) Hours As Needed for Wheezing or Shortness of Air., Disp: , Rfl:   •  allopurinol (ZYLOPRIM) 100 MG tablet, Take 100 mg by mouth Daily., Disp: , Rfl:   •  apixaban (ELIQUIS) 5 MG tablet tablet, Take 1 tablet by mouth Every 12 (Twelve) Hours., Disp: 60 tablet, Rfl: 0  •  cholecalciferol (VITAMIN D3) 1000 units tablet, Take 1,000 Units by mouth Daily., Disp: , Rfl:   •  colchicine 0.6 MG tablet, Take 1 tablet by mouth Daily., Disp: 90 tablet, Rfl: 2  •  dronabinol (MARINOL) 5 MG capsule, Take 1 capsule by mouth 2 (Two) Times a Day Before Meals., Disp: 60 capsule, Rfl: 2  •  flecainide (TAMBOCOR) 50 MG tablet, Take 1 tablet by mouth Every 12 (Twelve) Hours., Disp: 60 tablet, Rfl: 0  •  FLUoxetine (PROzac) 20 MG capsule, Take 20 mg by mouth Every Night., Disp: , Rfl:   •  granisetron (SANCUSO) 3.1 MG/24HR, Place 1 patch on the skin as directed by provider Every 7 (Seven) Days., Disp: 4 patch, Rfl: 3  •  lansoprazole (PREVACID) 30 MG capsule, Take 30 mg by mouth Daily., Disp: , Rfl:   •  lidocaine-prilocaine (EMLA) 2.5-2.5 % cream, Apply topically to the appropriate area as directed Every 2 (Two) Hours As Needed for Mild Pain ., Disp: 30 g, Rfl: 5  •  linaclotide (LINZESS) 145 MCG capsule capsule, Take 1 capsule by mouth Every Morning Before Breakfast., Disp: 30 capsule, Rfl: 3  •  LORazepam (ATIVAN) 0.5 MG tablet, Take one to two tablets by mouth every 4 to 6 hours for nausea., Disp: 120 tablet, Rfl: 0  •  losartan (COZAAR) 100 MG tablet, Take 1 tablet by mouth Daily. (Patient taking differently: Take 25 mg by mouth Daily.), Disp: 90 tablet, Rfl: 3  •  metoprolol tartrate (LOPRESSOR) 50 MG tablet, Take 25 mg by mouth As Needed., Disp: , Rfl:   •  mometasone (ASMANEX) 220 MCG/INH inhaler, Inhale 2 puffs Every Night., Disp: , Rfl:   •  montelukast (SINGULAIR) 10 MG tablet, Take 10 mg by mouth Daily., Disp:  , Rfl:   •  Morphine (MS CONTIN) 30 MG 12 hr tablet, Take 1 tablet by mouth Every 12 (Twelve) Hours., Disp: 60 tablet, Rfl: 0  •  nitroglycerin (NITROSTAT) 0.4 MG SL tablet, Place 0.4 mg under the tongue as needed for chest pain., Disp: , Rfl:   •  ondansetron (ZOFRAN) 8 MG tablet, Take 1 tablet by mouth Every 8 (Eight) Hours As Needed for Nausea or Vomiting., Disp: 90 tablet, Rfl: 6  •  oxyCODONE (ROXICODONE) 5 MG immediate release tablet, Take 1-3 tabs every 4 hours as needed for pain, Disp: 200 tablet, Rfl: 0  •  pioglitazone (ACTOS) 30 MG tablet, Take 30 mg by mouth Daily., Disp: , Rfl:   •  polyethylene glycol (MIRALAX) packet, Take 17 g by mouth Daily As Needed., Disp: , Rfl:   •  predniSONE (DELTASONE) 10 MG tablet, Take 4 tabs x 2 days, 3 tabs x 2 days, 2 tabs x 2 days, 1 tab x 2 days, then stop, Disp: 20 tablet, Rfl: 0  •  prochlorperazine (COMPAZINE) 10 MG tablet, Take 1 tablet by mouth Every 6 (Six) Hours As Needed for Nausea or Vomiting., Disp: 60 tablet, Rfl: 5  •  sodium chloride 0.9 % solution, Infuse 1,000 mL into a venous catheter Daily. To be given over 4 hours, Disp: , Rfl:   •  sore muscle (ICY HOT EXTRA STRENGTH) 10-30 % cream cream, Apply 1 application topically to the appropriate area as directed 3 (Three) Times a Day As Needed (local pain)., Disp: , Rfl:   •  Tiotropium Bromide-Olodaterol 2.5-2.5 MCG/ACT aerosol solution, Inhale 2 puffs Daily., Disp: , Rfl:   No current facility-administered medications for this visit.     Facility-Administered Medications Ordered in Other Visits:   •  sodium chloride 0.9 % infusion  - ADS Override Pull, , , ,   •  sodium chloride 0.9 % infusion  - ADS Override Pull, , , ,     ALLERGIES:    Allergies   Allergen Reactions   • Contrast Dye Other (See Comments)     Can't have due to kidney failure per family     PHYSICAL EXAM:  Vitals:    12/04/18 1139   BP: 120/88   Pulse: 96   Resp: 18   Temp: 97.6 °F (36.4 °C)   SpO2: 97%   General:  Awake, alert and  oriented. Appears to be feeling better today and smiling, in better spirits.   HEENT:  Pupils are equal, round and reactive to light and accommodation, Extra-ocular movements full, Oropharyx clear, mm dry  Neck:  No JVD, thyromegaly or lymphadenopathy  CV:  Regular rate/rhythm, no murmurs, rubs or gallops  Resp:  Lungs are clear to auscultation bilaterally  Abd:  Soft, sl tender to palpation bi over the epigastric and RUQ areas, non-distended, bowel sounds normal, no organomegaly or masses.  Surgical incisions well-healed.  Ext:  No clubbing, cyanosis or edema  Lymph:  No cervical, supraclavicular, axillary,adenopathy  Neuro: grossly non-focal exam    PATHOLOGY:  08-15-18         ENDOSCOPY:  18 ERCP with papillotomy, balloon rotation of the biliary stricture, pathology brushings, ileum stent placement (Georges)  1.  Irregular 3 cm distal common bile duct stricture concerning for neoplastic disease. Biliary papillotomy, stricture dilatation by  through the scope balloon, cytology brushings performed and 10 Japanese by 5 cm biliary stent placed.    2.  Dilated common hepatic duct to 15 mm when fully contrast filled.  Dilated intrahepatic biliary tree.    3.  No stones proximal to the stricture were identified.    4.  Slight dilatation of the distal pancreatic duct without a discrete stricture identified.    IMAGIN18 CT Abdomen Pelvis Stone Protocol   Findings  - LOWER THORAX: Clear. No effusions.  - LIVER: Homogeneous. No focal hepatic mass or ductal dilatation.  - GALLBLADDER: MINIMAL STRANDING AROUND REGION OF GALLBLADDER FOSSA  THAT MAY BE POSTSURGICAL.  - PANCREAS: Unremarkable. No mass or ductal dilatation.  - SPLEEN: Homogeneous. No splenomegaly.  - ADRENALS: No mass.  - KIDNEYS: No mass. No obstructive uropathy.  No evidence of urolithiasis.  - GI TRACT: SMALL HIATAL HERNIA.  - PERITONEUM: No free air. No free fluid or loculated fluid collections.  - MESENTERY: Unremarkable.  - LYMPH NODES: No  lymphadenopathy.  - VASCULATURE: ATHEROSCLEROTIC VASCULAR CALCIFICATION.  - ABDOMINAL WALL: No focal hernia or mass.  - OTHER: None.  - BLADDER: No focal mass or significant wall thickening  - REPRODUCTIVE: Unremarkable as visualized.  - APPENDIX: Nondistended. No surrounding inflammation.  - BONES: No acute bony abnormality.     IMPRESSION:  - Minimal stranding around region of gallbladder fossa that may be postsurgical.     08-13-18 US Liver   FINDINGS:   - Visualized pancreas is unremarkable.   - The gallbladder is absent. No collection identified.   - The CBD measures 10.14081844466 mm    .  - The liver demonstrates normal echogenicity without focal lesion.    - No ascites demonstrated.        IMPRESSION:  - As above.    08-22-18 CT Abdomen Pelvis Stone Protocol   FINDINGS:   - Minimal bibasilar atelectasis.  - Hiatal hernia.  - The liver is homogeneous. There is no evidence of focal hepatic mass.  - The spleen is homogeneous.  - Stent is noted traversing the common bile duct.  - 3 mm nodule in the right lung on image 8 of the axial series.  - There is no adrenal enlargement.  - The kidneys show no evidence of hydronephrosis or hydroureter. I do not see any distal ureteral stones.   - Otherwise I do not see any free fluid or walled off fluid collections.  - There is moderate to large volume stool in the colon.  - There is no evidence of mesenteric or retroperitoneal adenopathy.     IMPRESSION:  1. Tiny nodule in the right lung base.  2. Minimal bibasilar atelectasis.  3. Moderate to large volume stool in the colon.     Other findings as above.    08-22-18 CT Abdomen Pelvis With Contrast   FINDINGS:   - Tiny left basilar nodule measuring 4 mm on image 14 of the axial series.  - Minimal bibasilar atelectasis.  - The liver is homogeneous. There is no evidence of focal hepatic mass  - The spleen is homogeneous  - Indistinct appearance of the pancreatic head. A biliary stent is present.  - Small left adrenal nodule  is present.  - The kidneys show no evidence of hydronephrosis or hydroureter. I do not see any distal ureteral stones.   - Otherwise I do not see any free fluid or walled off fluid collections.  - Large volume stool is present in the colon.     IMPRESSION:  1. Slightly enlarged, indistinct appearance of the pancreatic head.  - Underlying neoplasm certainly not excluded but no delineable mass is present. There is a biliary stent.    2. Tiny nodule in the left lung base.    3. Small left adrenal nodule.    4. Hiatal hernia.    5. Moderate to large volume stool in the colon.      09-11-18 CT Chest Without Contrast   FINDINGS:   - Minimal coronary artery calcifications present.  - No pericardial or pleural effusions.  - No parenchymal soft tissue nodules or masses. There are findings of COPD.     IMPRESSION:  - COPD.  - Hiatal hernia.  - Minimal coronary artery calcification.       09-11-18 CT Abdomen Pelvis Without Contrast   FINDINGS:   - Lung bases show mild increased interstitial markings.  - There is a hiatal hernia.  - The liver is homogeneous. There is no evidence of focal hepatic mass.  - The spleen is homogeneous.  - Mildly indistinct appearance of the pancreas. There is a biliary stent present. I cannot exclude mild degree of pancreatitis..  - There is no adrenal enlargement.  - The kidneys show no evidence of hydronephrosis or hydroureter. I do not see any distal ureteral stones.   - Otherwise I do not see any free fluid or walled off fluid collections.  - Large volume stool is present in the colon.  - Urinary bladder wall is slightly thickened.  - There is no evidence of mesenteric or retroperitoneal adenopathy.    IMPRESSION:  1. Mildly indistinct appearance of the proximal pancreas.  2. Urinary bladder wall thickening.  3. Large volume stool in the colon.    09-14-18 CT Abdomen Without Contrast   FINDINGS:   - Again noted is very mild indistinct appearance of the pancreatic head. Stent is stable in  position.  - The liver is stable and homogeneous.  - Spleen is homogeneous.  - No adrenal enlargement.  - Moderate to large volume stool in the colon.     IMPRESSION:  - No significant interval change since the previous exam.     09-21-18 NM Pet Skull Base To Mid Thigh   FINDINGS:      HEAD/NECK:  - No FDG hypermetabolic neck adenopathy.  - No FDG hypermetabolic masses.     CHEST:   - No FDG hypermetabolic thoracic adenopathy.  - No FDG hypermetabolic lung nodules or masses.  - Evaluation for tiny parenchymal nodules is somewhat limited on low dose CT secondary to respiratory motion.     ABDOMEN/PELVIS:   - There is hypermetabolic activity in the pancreatic head with a maximum SUV of 4.971.  - Physiologic FDG hypermetabolism seen throughout the GI tract.  - Physiologic FDG hypermetabolism seen throughout the mesentery, retroperitoneum and pelvis.     BONES:   - There are scattered foci of FDG hypermetabolism most suggestive of degenerative change seen throughout the axial skeleton. If concern persist for metastatic lesions of the bone, HDP Bone scan is recommended for further evaluation.     IMPRESSION:  - Abnormal hypermetabolic activity corresponding to area of indistinctness in the pancreatic head. Maximum SUV is 4.97.    RECENT LABS:  Lab Results   Component Value Date    WBC 11.36 12/04/2018    HGB 12.5 (L) 12/04/2018    HCT 38.1 (L) 12/04/2018    MCV 90.5 12/04/2018    RDW 15.8 (H) 12/04/2018     (H) 12/04/2018    NEUTRORELPCT 61.7 11/20/2018    LYMPHORELPCT 25.3 11/20/2018    MONORELPCT 10.8 11/20/2018    EOSRELPCT 0.0 11/20/2018    BASORELPCT 0.2 11/20/2018    NEUTROABS 6.82 (H) 12/04/2018    LYMPHSABS 1.13 11/20/2018       Lab Results   Component Value Date     12/04/2018    K 4.1 12/04/2018    CO2 28.8 12/04/2018     12/04/2018    BUN 36 (H) 12/04/2018    CREATININE 1.37 (H) 12/04/2018    EGFRIFNONA 51 (L) 12/04/2018    EGFRIFAFRI  09/02/2016      Comment:      <15 Indicative of kidney  failure.    GLUCOSE 128 (H) 12/04/2018    CALCIUM 9.8 12/04/2018    ALKPHOS 118 12/04/2018    AST 21 12/04/2018    ALT 16 12/04/2018    BILITOT 0.3 12/04/2018    ALBUMIN 4.20 12/04/2018    PROTEINTOT 7.3 12/04/2018    MG 1.7 11/01/2018       Lab Results   Component Value Date/Time    URICACID 7.5 (H) 10/16/2018 12:18 PM     Lab Results   Component Value Date     1576 (H) 11/13/2018     5149 (H) 10/19/2018     7602 (H) 09/25/2018     3636 (H) 09/07/2018     4032 (H) 08/15/2018     ASSESSMENT & PLAN:  Todd Phillips is a very pleasant 68 y.o. male with presumed cancer of the head of the pancreas with a malignant appearing stricture in the CBD.    1.  Pancreatic cancer:  -  Imaging shows vague abnormality in the head of the pancreas which is hypermetabolic on PET-CT.  Biopsy was c/w pancreatic cancer and Ca19-9 is markedly elevated.    -  Biliary stent was exchanged for metal stent to relieve obstruction / cholangitis.   -Recommended neoadjuvant chemotherapy with Gemcitabine and Abraxane and he has completed one cycle to date. He has had significant improvement in CA 19-9 as above.   - Unfortunately, he has been struggling with ongoing nausea/vomiting and dehydration. As a result, he has very poor nutrition and his symptoms have made it very difficult to treat him. Dr. Alves called and d/w Dr. Hale who  recommended we start him on TPN which has been very helpful and he has been slowly improving. He followed up with Dr. Hale last week and says he wants him to continue with TPN and planning for repeat imaging and follow up with him next week. Based on imaging, will possibly schedule surgery at that time. Will continue holding treatment and closely monitor with supportive care. He will follow up again next week with repeat labs.     2.  Nausea/Dehydration/Poor nutrition:  -Nausea currently improved with Sancuso patch and Compazine and Ativan 0.5 mg (1-2 tabs) scheduled every 4 hours. He  is also taking Marinol 5 mg BID.  -Will continueTPN per Dr. Hale's recommendations which has been very helpful.   -He continues to struggle with oral hydration. Cr 1.37 today. Therefore, will give 1L of IVF in clinic today. He will try and increase water intake to 4 bottles of water/day. Will continue to closely monitor.     3.  Neoplasm related pain:  -Currently well controlled with MSContin 30 mg BID. He also has Oxycodone 5 mg 1-3 tabs q 4h prn (which he rarely takes (maybe 1-2 times/week).     4. Constipation: Currently denies. Continue Senna/ Colace ii BID with Miralax as needed.      5.   History of Atrial fibrillation:  Chronically anticoagulated on Eliquis.     6.  Prophylaxis:  Has had 2018 influenza vaccine.      7.  ACO Quality measures  - Todd Phillips does not use tobacco products.  - Patient's Body mass index is 26.42 kg/m². BMI is above normal parameters. Recommendations include: none given current problems above. .  - Current outpatient and discharge medications have been reconciled for the patient.  Reviewed by:VIMAL Nielson      I spent 25 minutes with Todd Phillips today.  More than 50% of the time was spent in counseling / coordination of care for the above problems.      Electronically Signed by: VIMAL Rolle      CC:   MD Miquel Quintana MD Aaron House, MD Michael Simons, MD

## 2018-12-04 NOTE — PROGRESS NOTES
Patient's spouse called me and was asking when patient's scan was scheduled for. I looked patient up in [facility] and saw that the scan is scheduled for 12/12/2018 at 8am. I sent Aletha at [facility] an All[employer] message asking if she could scheduled patient with Mary Grace later on that day. I called spouse back and let her know that scan is scheduled on 12/12/2018 at 8am. I also gave her directions to [address] at the [address]  and go right up the ramp and registration was on the left. Spouse verbalized understanding at this time. BUFFY

## 2018-12-06 ENCOUNTER — HOSPITAL ENCOUNTER (INPATIENT)
Facility: HOSPITAL | Age: 68
LOS: 5 days | Discharge: HOME HEALTH | End: 2018-12-13
Attending: FAMILY MEDICINE | Admitting: INTERNAL MEDICINE

## 2018-12-06 ENCOUNTER — APPOINTMENT (OUTPATIENT)
Dept: GENERAL RADIOLOGY | Facility: HOSPITAL | Age: 68
End: 2018-12-06

## 2018-12-06 DIAGNOSIS — R50.9 FEVER, UNSPECIFIED FEVER CAUSE: Primary | ICD-10-CM

## 2018-12-06 DIAGNOSIS — C25.9 MALIGNANT NEOPLASM OF PANCREAS, UNSPECIFIED LOCATION OF MALIGNANCY (HCC): ICD-10-CM

## 2018-12-06 DIAGNOSIS — R53.1 WEAKNESS: ICD-10-CM

## 2018-12-06 LAB
A-A DO2: 34.3 MMHG (ref 0–300)
AMMONIA BLD-SCNC: 20 UMOL/L (ref 16–60)
APTT PPP: 36.5 SECONDS (ref 23.8–36.1)
ARTERIAL PATENCY WRIST A: POSITIVE
ATMOSPHERIC PRESS: 736 MMHG
BASE EXCESS BLDA CALC-SCNC: 2.2 MMOL/L
BASOPHILS # BLD AUTO: 0.06 10*3/MM3 (ref 0–0.3)
BASOPHILS NFR BLD AUTO: 0.6 % (ref 0–2)
BDY SITE: ABNORMAL
BILIRUB UR QL STRIP: NEGATIVE
BODY TEMPERATURE: 98.6 C
CLARITY UR: CLEAR
COHGB MFR BLD: 1.1 % (ref 0–5)
COLOR UR: YELLOW
CRP SERPL-MCNC: 2 MG/DL (ref 0–0.99)
D-LACTATE SERPL-SCNC: 1.3 MMOL/L (ref 0.5–2)
DEPRECATED RDW RBC AUTO: 50.5 FL (ref 37–54)
EOSINOPHIL # BLD AUTO: 0.21 10*3/MM3 (ref 0–0.7)
EOSINOPHIL NFR BLD AUTO: 2 % (ref 0–7)
ERYTHROCYTE [DISTWIDTH] IN BLOOD BY AUTOMATED COUNT: 16.1 % (ref 11.5–14.5)
GLUCOSE UR STRIP-MCNC: NEGATIVE MG/DL
HCO3 BLDA-SCNC: 24.3 MMOL/L (ref 22–26)
HCT VFR BLD AUTO: 33.9 % (ref 42–52)
HCT VFR BLD CALC: 35 % (ref 42–52)
HGB BLD-MCNC: 11.3 G/DL (ref 14–18)
HGB BLDA-MCNC: 12 G/DL (ref 12–16)
HGB UR QL STRIP.AUTO: NEGATIVE
HOROWITZ INDEX BLD+IHG-RTO: 21 %
IMM GRANULOCYTES # BLD: 0.47 10*3/MM3 (ref 0–0.03)
IMM GRANULOCYTES NFR BLD: 4.4 % (ref 0–0.5)
INR PPP: 1.56 (ref 0.9–1.1)
KETONES UR QL STRIP: NEGATIVE
LEUKOCYTE ESTERASE UR QL STRIP.AUTO: NEGATIVE
LIPASE SERPL-CCNC: 19 U/L (ref 13–60)
LYMPHOCYTES # BLD AUTO: 1.39 10*3/MM3 (ref 1–3)
LYMPHOCYTES NFR BLD AUTO: 13 % (ref 16–46)
MCH RBC QN AUTO: 29.8 PG (ref 27–33)
MCHC RBC AUTO-ENTMCNC: 33.3 G/DL (ref 33–37)
MCV RBC AUTO: 89.4 FL (ref 80–94)
METHGB BLD QL: 0.3 % (ref 0–3)
MODALITY: ABNORMAL
MONOCYTES # BLD AUTO: 2.02 10*3/MM3 (ref 0.1–0.9)
MONOCYTES NFR BLD AUTO: 18.8 % (ref 0–12)
NEUTROPHILS # BLD AUTO: 6.57 10*3/MM3 (ref 1.4–6.5)
NEUTROPHILS NFR BLD AUTO: 61.2 % (ref 40–75)
NITRITE UR QL STRIP: NEGATIVE
OXYHGB MFR BLDV: 94.1 % (ref 85–100)
PCO2 BLDA: 29.7 MM HG (ref 35–45)
PH BLDA: 7.53 PH UNITS (ref 7.35–7.45)
PH UR STRIP.AUTO: 7 [PH] (ref 5–8)
PLATELET # BLD AUTO: 323 10*3/MM3 (ref 130–400)
PMV BLD AUTO: 10 FL (ref 6–10)
PO2 BLDA: 74.8 MM HG (ref 80–100)
PROT UR QL STRIP: NEGATIVE
PROTHROMBIN TIME: 18.9 SECONDS (ref 11–15.4)
RBC # BLD AUTO: 3.79 10*6/MM3 (ref 4.7–6.1)
SAO2 % BLDCOA: 95.4 % (ref 90–100)
SP GR UR STRIP: 1.02 (ref 1–1.03)
UROBILINOGEN UR QL STRIP: NORMAL
VOLUME % O2: 34.3 %
WBC NRBC COR # BLD: 10.72 10*3/MM3 (ref 4.5–12.5)

## 2018-12-06 PROCEDURE — 82375 ASSAY CARBOXYHB QUANT: CPT | Performed by: FAMILY MEDICINE

## 2018-12-06 PROCEDURE — 83880 ASSAY OF NATRIURETIC PEPTIDE: CPT | Performed by: FAMILY MEDICINE

## 2018-12-06 PROCEDURE — 93010 ELECTROCARDIOGRAM REPORT: CPT | Performed by: INTERNAL MEDICINE

## 2018-12-06 PROCEDURE — 81003 URINALYSIS AUTO W/O SCOPE: CPT | Performed by: FAMILY MEDICINE

## 2018-12-06 PROCEDURE — 87040 BLOOD CULTURE FOR BACTERIA: CPT | Performed by: FAMILY MEDICINE

## 2018-12-06 PROCEDURE — 83605 ASSAY OF LACTIC ACID: CPT | Performed by: FAMILY MEDICINE

## 2018-12-06 PROCEDURE — 85730 THROMBOPLASTIN TIME PARTIAL: CPT | Performed by: FAMILY MEDICINE

## 2018-12-06 PROCEDURE — 93005 ELECTROCARDIOGRAM TRACING: CPT | Performed by: FAMILY MEDICINE

## 2018-12-06 PROCEDURE — 99284 EMERGENCY DEPT VISIT MOD MDM: CPT

## 2018-12-06 PROCEDURE — 83050 HGB METHEMOGLOBIN QUAN: CPT | Performed by: FAMILY MEDICINE

## 2018-12-06 PROCEDURE — 85610 PROTHROMBIN TIME: CPT | Performed by: FAMILY MEDICINE

## 2018-12-06 PROCEDURE — 36600 WITHDRAWAL OF ARTERIAL BLOOD: CPT | Performed by: FAMILY MEDICINE

## 2018-12-06 PROCEDURE — 82140 ASSAY OF AMMONIA: CPT | Performed by: FAMILY MEDICINE

## 2018-12-06 PROCEDURE — 85025 COMPLETE CBC W/AUTO DIFF WBC: CPT | Performed by: FAMILY MEDICINE

## 2018-12-06 PROCEDURE — 86140 C-REACTIVE PROTEIN: CPT | Performed by: FAMILY MEDICINE

## 2018-12-06 PROCEDURE — 83690 ASSAY OF LIPASE: CPT | Performed by: FAMILY MEDICINE

## 2018-12-06 PROCEDURE — 82805 BLOOD GASES W/O2 SATURATION: CPT | Performed by: FAMILY MEDICINE

## 2018-12-06 PROCEDURE — 82150 ASSAY OF AMYLASE: CPT | Performed by: FAMILY MEDICINE

## 2018-12-06 PROCEDURE — 71045 X-RAY EXAM CHEST 1 VIEW: CPT | Performed by: RADIOLOGY

## 2018-12-06 PROCEDURE — 84484 ASSAY OF TROPONIN QUANT: CPT | Performed by: FAMILY MEDICINE

## 2018-12-06 PROCEDURE — 71045 X-RAY EXAM CHEST 1 VIEW: CPT

## 2018-12-06 PROCEDURE — 80053 COMPREHEN METABOLIC PANEL: CPT | Performed by: FAMILY MEDICINE

## 2018-12-06 PROCEDURE — 87086 URINE CULTURE/COLONY COUNT: CPT | Performed by: FAMILY MEDICINE

## 2018-12-06 PROCEDURE — 25010000002 ONDANSETRON PER 1 MG: Performed by: FAMILY MEDICINE

## 2018-12-06 RX ORDER — ONDANSETRON 2 MG/ML
4 INJECTION INTRAMUSCULAR; INTRAVENOUS ONCE
Status: COMPLETED | OUTPATIENT
Start: 2018-12-06 | End: 2018-12-06

## 2018-12-06 RX ORDER — SODIUM CHLORIDE 0.9 % (FLUSH) 0.9 %
10 SYRINGE (ML) INJECTION AS NEEDED
Status: DISCONTINUED | OUTPATIENT
Start: 2018-12-06 | End: 2018-12-13 | Stop reason: HOSPADM

## 2018-12-06 RX ADMIN — SODIUM CHLORIDE, POTASSIUM CHLORIDE, SODIUM LACTATE AND CALCIUM CHLORIDE 2640 ML: 600; 310; 30; 20 INJECTION, SOLUTION INTRAVENOUS at 23:22

## 2018-12-06 RX ADMIN — ONDANSETRON 4 MG: 2 INJECTION, SOLUTION INTRAMUSCULAR; INTRAVENOUS at 23:41

## 2018-12-07 ENCOUNTER — APPOINTMENT (OUTPATIENT)
Dept: CT IMAGING | Facility: HOSPITAL | Age: 68
End: 2018-12-07

## 2018-12-07 ENCOUNTER — APPOINTMENT (OUTPATIENT)
Dept: GENERAL RADIOLOGY | Facility: HOSPITAL | Age: 68
End: 2018-12-07

## 2018-12-07 PROBLEM — R50.9 FEVER: Status: ACTIVE | Noted: 2018-12-07

## 2018-12-07 LAB
ALBUMIN SERPL-MCNC: 3.8 G/DL (ref 3.4–4.8)
ALBUMIN/GLOB SERPL: 1.4 G/DL (ref 1.5–2.5)
ALP SERPL-CCNC: 102 U/L (ref 40–129)
ALT SERPL W P-5'-P-CCNC: 18 U/L (ref 10–44)
AMYLASE SERPL-CCNC: 57 U/L (ref 28–100)
ANION GAP SERPL CALCULATED.3IONS-SCNC: 8.4 MMOL/L (ref 3.6–11.2)
ANION GAP SERPL CALCULATED.3IONS-SCNC: 9 MMOL/L (ref 3.6–11.2)
ANISOCYTOSIS BLD QL: ABNORMAL
AST SERPL-CCNC: 18 U/L (ref 10–34)
BILIRUB SERPL-MCNC: 0.4 MG/DL (ref 0.2–1.8)
BNP SERPL-MCNC: 37 PG/ML (ref 0–100)
BUN BLD-MCNC: 30 MG/DL (ref 7–21)
BUN BLD-MCNC: 33 MG/DL (ref 7–21)
BUN/CREAT SERPL: 24.2 (ref 7–25)
BUN/CREAT SERPL: 26 (ref 7–25)
CALCIUM SPEC-SCNC: 8.6 MG/DL (ref 7.7–10)
CALCIUM SPEC-SCNC: 9.1 MG/DL (ref 7.7–10)
CHLORIDE SERPL-SCNC: 102 MMOL/L (ref 99–112)
CHLORIDE SERPL-SCNC: 99 MMOL/L (ref 99–112)
CO2 SERPL-SCNC: 20.6 MMOL/L (ref 24.3–31.9)
CO2 SERPL-SCNC: 24 MMOL/L (ref 24.3–31.9)
CREAT BLD-MCNC: 1.24 MG/DL (ref 0.43–1.29)
CREAT BLD-MCNC: 1.27 MG/DL (ref 0.43–1.29)
CRP SERPL-MCNC: 3.15 MG/DL (ref 0–0.99)
D-LACTATE SERPL-SCNC: 2.1 MMOL/L (ref 0.5–2)
D-LACTATE SERPL-SCNC: 2.5 MMOL/L (ref 0.5–2)
DEPRECATED RDW RBC AUTO: 51.7 FL (ref 37–54)
EOSINOPHIL # BLD MANUAL: 0.11 10*3/MM3 (ref 0–0.7)
EOSINOPHIL NFR BLD MANUAL: 1 % (ref 0–7)
ERYTHROCYTE [DISTWIDTH] IN BLOOD BY AUTOMATED COUNT: 16.1 % (ref 11.5–14.5)
GFR SERPL CREATININE-BSD FRML MDRD: 56 ML/MIN/1.73
GFR SERPL CREATININE-BSD FRML MDRD: 58 ML/MIN/1.73
GLOBULIN UR ELPH-MCNC: 2.8 GM/DL
GLUCOSE BLD-MCNC: 172 MG/DL (ref 70–110)
GLUCOSE BLD-MCNC: 251 MG/DL (ref 70–110)
GLUCOSE BLDC GLUCOMTR-MCNC: 231 MG/DL (ref 70–130)
GLUCOSE BLDC GLUCOMTR-MCNC: 243 MG/DL (ref 70–130)
GLUCOSE BLDC GLUCOMTR-MCNC: 252 MG/DL (ref 70–130)
GLUCOSE BLDC GLUCOMTR-MCNC: 294 MG/DL (ref 70–130)
HCT VFR BLD AUTO: 34.6 % (ref 42–52)
HGB BLD-MCNC: 11.3 G/DL (ref 14–18)
HOLD SPECIMEN: NORMAL
LYMPHOCYTES # BLD MANUAL: 1.07 10*3/MM3 (ref 1–3)
LYMPHOCYTES NFR BLD MANUAL: 10 % (ref 16–46)
LYMPHOCYTES NFR BLD MANUAL: 3 % (ref 0–12)
MAGNESIUM SERPL-MCNC: 1.7 MG/DL (ref 1.7–2.6)
MCH RBC QN AUTO: 29.8 PG (ref 27–33)
MCHC RBC AUTO-ENTMCNC: 32.7 G/DL (ref 33–37)
MCV RBC AUTO: 91.3 FL (ref 80–94)
METAMYELOCYTES NFR BLD MANUAL: 3 % (ref 0–0)
MONOCYTES # BLD AUTO: 0.32 10*3/MM3 (ref 0.1–0.9)
MYELOCYTES NFR BLD MANUAL: 2 % (ref 0–0)
NEUTROPHILS # BLD AUTO: 8.65 10*3/MM3 (ref 1.4–6.5)
NEUTROPHILS NFR BLD MANUAL: 79 % (ref 40–75)
NEUTS BAND NFR BLD MANUAL: 2 % (ref 0–8)
OSMOLALITY SERPL CALC.SUM OF ELEC: 275.9 MOSM/KG (ref 273–305)
OSMOLALITY SERPL CALC.SUM OF ELEC: 277.3 MOSM/KG (ref 273–305)
OVALOCYTES BLD QL SMEAR: ABNORMAL
PHOSPHATE SERPL-MCNC: 3.9 MG/DL (ref 2.7–4.5)
PLAT MORPH BLD: NORMAL
PLATELET # BLD AUTO: 282 10*3/MM3 (ref 130–400)
PMV BLD AUTO: 9.8 FL (ref 6–10)
POTASSIUM BLD-SCNC: 4.2 MMOL/L (ref 3.5–5.3)
POTASSIUM BLD-SCNC: 4.2 MMOL/L (ref 3.5–5.3)
PROT SERPL-MCNC: 6.6 G/DL (ref 6–8)
RBC # BLD AUTO: 3.79 10*6/MM3 (ref 4.7–6.1)
SCAN SLIDE: NORMAL
SODIUM BLD-SCNC: 131 MMOL/L (ref 135–153)
SODIUM BLD-SCNC: 132 MMOL/L (ref 135–153)
TRIGL SERPL-MCNC: 127 MG/DL (ref 0–150)
TROPONIN I SERPL-MCNC: 0.01 NG/ML
WBC NRBC COR # BLD: 10.68 10*3/MM3 (ref 4.5–12.5)

## 2018-12-07 PROCEDURE — 63710000001 INSULIN ASPART PER 5 UNITS: Performed by: INTERNAL MEDICINE

## 2018-12-07 PROCEDURE — 84478 ASSAY OF TRIGLYCERIDES: CPT

## 2018-12-07 PROCEDURE — 85007 BL SMEAR W/DIFF WBC COUNT: CPT | Performed by: INTERNAL MEDICINE

## 2018-12-07 PROCEDURE — 63710000001 DRONABINOL PER 2.5 MG: Performed by: INTERNAL MEDICINE

## 2018-12-07 PROCEDURE — 85025 COMPLETE CBC W/AUTO DIFF WBC: CPT | Performed by: INTERNAL MEDICINE

## 2018-12-07 PROCEDURE — 71046 X-RAY EXAM CHEST 2 VIEWS: CPT | Performed by: RADIOLOGY

## 2018-12-07 PROCEDURE — 83735 ASSAY OF MAGNESIUM: CPT | Performed by: INTERNAL MEDICINE

## 2018-12-07 PROCEDURE — 82962 GLUCOSE BLOOD TEST: CPT

## 2018-12-07 PROCEDURE — 99222 1ST HOSP IP/OBS MODERATE 55: CPT | Performed by: NURSE PRACTITIONER

## 2018-12-07 PROCEDURE — 80048 BASIC METABOLIC PNL TOTAL CA: CPT | Performed by: INTERNAL MEDICINE

## 2018-12-07 PROCEDURE — 70450 CT HEAD/BRAIN W/O DYE: CPT | Performed by: RADIOLOGY

## 2018-12-07 PROCEDURE — 94799 UNLISTED PULMONARY SVC/PX: CPT

## 2018-12-07 PROCEDURE — 25010000002 CEFEPIME 2 G/NS 100 ML SOLUTION: Performed by: INTERNAL MEDICINE

## 2018-12-07 PROCEDURE — 25010000002 ONDANSETRON PER 1 MG: Performed by: INTERNAL MEDICINE

## 2018-12-07 PROCEDURE — 74019 RADEX ABDOMEN 2 VIEWS: CPT | Performed by: RADIOLOGY

## 2018-12-07 PROCEDURE — 25010000003 MICAFUNGIN SODIUM 100 MG RECONSTITUTED SOLUTION 1 EACH VIAL: Performed by: FAMILY MEDICINE

## 2018-12-07 PROCEDURE — 63710000001 INSULIN ASPART PER 5 UNITS

## 2018-12-07 PROCEDURE — 25010000002 METHYLPREDNISOLONE PER 125 MG: Performed by: FAMILY MEDICINE

## 2018-12-07 PROCEDURE — G0378 HOSPITAL OBSERVATION PER HR: HCPCS

## 2018-12-07 PROCEDURE — 74019 RADEX ABDOMEN 2 VIEWS: CPT

## 2018-12-07 PROCEDURE — 25010000002 PROMETHAZINE PER 50 MG

## 2018-12-07 PROCEDURE — 70450 CT HEAD/BRAIN W/O DYE: CPT

## 2018-12-07 PROCEDURE — G0108 DIAB MANAGE TRN  PER INDIV: HCPCS

## 2018-12-07 PROCEDURE — 25010000002 MORPHINE PER 10 MG: Performed by: FAMILY MEDICINE

## 2018-12-07 PROCEDURE — 25010000002 VANCOMYCIN 5 G RECONSTITUTED SOLUTION 5,000 MG VIAL: Performed by: FAMILY MEDICINE

## 2018-12-07 PROCEDURE — 84100 ASSAY OF PHOSPHORUS: CPT

## 2018-12-07 PROCEDURE — 71046 X-RAY EXAM CHEST 2 VIEWS: CPT

## 2018-12-07 PROCEDURE — 83605 ASSAY OF LACTIC ACID: CPT | Performed by: INTERNAL MEDICINE

## 2018-12-07 PROCEDURE — 25010000002 PIPERACILLIN-TAZOBACTAM: Performed by: FAMILY MEDICINE

## 2018-12-07 PROCEDURE — 25010000002 PROMETHAZINE PER 50 MG: Performed by: NURSE PRACTITIONER

## 2018-12-07 PROCEDURE — 86140 C-REACTIVE PROTEIN: CPT | Performed by: INTERNAL MEDICINE

## 2018-12-07 RX ORDER — ACETAMINOPHEN 325 MG/1
650 TABLET ORAL EVERY 4 HOURS PRN
Status: DISCONTINUED | OUTPATIENT
Start: 2018-12-07 | End: 2018-12-13 | Stop reason: HOSPADM

## 2018-12-07 RX ORDER — LORAZEPAM 0.5 MG/1
0.5 TABLET ORAL EVERY 6 HOURS PRN
Status: DISCONTINUED | OUTPATIENT
Start: 2018-12-07 | End: 2018-12-12

## 2018-12-07 RX ORDER — FLECAINIDE ACETATE 50 MG/1
50 TABLET ORAL EVERY 12 HOURS SCHEDULED
Status: DISCONTINUED | OUTPATIENT
Start: 2018-12-07 | End: 2018-12-13 | Stop reason: HOSPADM

## 2018-12-07 RX ORDER — METHYLPREDNISOLONE SODIUM SUCCINATE 125 MG/2ML
125 INJECTION, POWDER, LYOPHILIZED, FOR SOLUTION INTRAMUSCULAR; INTRAVENOUS ONCE
Status: COMPLETED | OUTPATIENT
Start: 2018-12-07 | End: 2018-12-07

## 2018-12-07 RX ORDER — ALBUTEROL SULFATE 2.5 MG/3ML
2.5 SOLUTION RESPIRATORY (INHALATION) EVERY 6 HOURS PRN
COMMUNITY

## 2018-12-07 RX ORDER — MAGNESIUM SULFATE HEPTAHYDRATE 40 MG/ML
4 INJECTION, SOLUTION INTRAVENOUS ONCE
Status: COMPLETED | OUTPATIENT
Start: 2018-12-07 | End: 2018-12-08

## 2018-12-07 RX ORDER — NICOTINE POLACRILEX 4 MG
15 LOZENGE BUCCAL
Status: DISCONTINUED | OUTPATIENT
Start: 2018-12-07 | End: 2018-12-13 | Stop reason: HOSPADM

## 2018-12-07 RX ORDER — OXYCODONE HYDROCHLORIDE 5 MG/1
5 TABLET ORAL EVERY 4 HOURS PRN
Status: DISCONTINUED | OUTPATIENT
Start: 2018-12-07 | End: 2018-12-13 | Stop reason: HOSPADM

## 2018-12-07 RX ORDER — SODIUM CHLORIDE 0.9 % (FLUSH) 0.9 %
3-10 SYRINGE (ML) INJECTION AS NEEDED
Status: DISCONTINUED | OUTPATIENT
Start: 2018-12-07 | End: 2018-12-13 | Stop reason: HOSPADM

## 2018-12-07 RX ORDER — OXYCODONE HYDROCHLORIDE 5 MG/1
5 TABLET ORAL EVERY 4 HOURS PRN
COMMUNITY
End: 2019-03-20 | Stop reason: SDUPTHER

## 2018-12-07 RX ORDER — OXYCODONE HYDROCHLORIDE 15 MG/1
15 TABLET ORAL EVERY 6 HOURS PRN
Status: DISCONTINUED | OUTPATIENT
Start: 2018-12-07 | End: 2018-12-07

## 2018-12-07 RX ORDER — PROMETHAZINE HYDROCHLORIDE 25 MG/ML
INJECTION, SOLUTION INTRAMUSCULAR; INTRAVENOUS
Status: COMPLETED
Start: 2018-12-07 | End: 2018-12-07

## 2018-12-07 RX ORDER — MONTELUKAST SODIUM 10 MG/1
10 TABLET ORAL DAILY
Status: DISCONTINUED | OUTPATIENT
Start: 2018-12-07 | End: 2018-12-13 | Stop reason: HOSPADM

## 2018-12-07 RX ORDER — BUDESONIDE 0.5 MG/2ML
0.5 INHALANT ORAL
Status: CANCELLED | OUTPATIENT
Start: 2018-12-07

## 2018-12-07 RX ORDER — OXYCODONE HYDROCHLORIDE 5 MG/1
15 TABLET ORAL EVERY 6 HOURS PRN
Status: ON HOLD | COMMUNITY
End: 2018-12-07

## 2018-12-07 RX ORDER — SODIUM CHLORIDE 9 MG/ML
INJECTION, SOLUTION INTRAVENOUS
Status: DISPENSED
Start: 2018-12-07 | End: 2018-12-08

## 2018-12-07 RX ORDER — ALLOPURINOL 100 MG/1
100 TABLET ORAL DAILY
Status: DISCONTINUED | OUTPATIENT
Start: 2018-12-07 | End: 2018-12-13 | Stop reason: HOSPADM

## 2018-12-07 RX ORDER — DRONABINOL 2.5 MG/1
5 CAPSULE ORAL
Status: DISCONTINUED | OUTPATIENT
Start: 2018-12-07 | End: 2018-12-13 | Stop reason: HOSPADM

## 2018-12-07 RX ORDER — ALBUTEROL SULFATE 2.5 MG/3ML
2.5 SOLUTION RESPIRATORY (INHALATION) EVERY 4 HOURS PRN
Status: DISCONTINUED | OUTPATIENT
Start: 2018-12-07 | End: 2018-12-13 | Stop reason: HOSPADM

## 2018-12-07 RX ORDER — POTASSIUM CHLORIDE 20 MEQ/1
40 TABLET, EXTENDED RELEASE ORAL AS NEEDED
Status: DISCONTINUED | OUTPATIENT
Start: 2018-12-07 | End: 2018-12-13 | Stop reason: HOSPADM

## 2018-12-07 RX ORDER — ONDANSETRON 4 MG/1
4 TABLET, ORALLY DISINTEGRATING ORAL EVERY 6 HOURS PRN
Status: DISCONTINUED | OUTPATIENT
Start: 2018-12-07 | End: 2018-12-11

## 2018-12-07 RX ORDER — MAGNESIUM SULFATE HEPTAHYDRATE 40 MG/ML
2 INJECTION, SOLUTION INTRAVENOUS AS NEEDED
Status: DISCONTINUED | OUTPATIENT
Start: 2018-12-07 | End: 2018-12-13 | Stop reason: HOSPADM

## 2018-12-07 RX ORDER — FLUOXETINE HYDROCHLORIDE 20 MG/1
20 CAPSULE ORAL 2 TIMES DAILY
Status: DISCONTINUED | OUTPATIENT
Start: 2018-12-07 | End: 2018-12-13 | Stop reason: HOSPADM

## 2018-12-07 RX ORDER — PANTOPRAZOLE SODIUM 40 MG/1
40 TABLET, DELAYED RELEASE ORAL EVERY MORNING
Status: DISCONTINUED | OUTPATIENT
Start: 2018-12-07 | End: 2018-12-12

## 2018-12-07 RX ORDER — MORPHINE SULFATE 30 MG/1
30 TABLET, FILM COATED, EXTENDED RELEASE ORAL EVERY 12 HOURS
Status: DISCONTINUED | OUTPATIENT
Start: 2018-12-07 | End: 2018-12-13 | Stop reason: HOSPADM

## 2018-12-07 RX ORDER — ONDANSETRON 4 MG/1
4 TABLET, FILM COATED ORAL EVERY 6 HOURS PRN
Status: DISCONTINUED | OUTPATIENT
Start: 2018-12-07 | End: 2018-12-11

## 2018-12-07 RX ORDER — ALUMINA, MAGNESIA, AND SIMETHICONE 2400; 2400; 240 MG/30ML; MG/30ML; MG/30ML
15 SUSPENSION ORAL EVERY 6 HOURS PRN
Status: DISCONTINUED | OUTPATIENT
Start: 2018-12-07 | End: 2018-12-13 | Stop reason: HOSPADM

## 2018-12-07 RX ORDER — PIOGLITAZONEHYDROCHLORIDE 15 MG/1
30 TABLET ORAL DAILY
Status: DISCONTINUED | OUTPATIENT
Start: 2018-12-07 | End: 2018-12-08

## 2018-12-07 RX ORDER — MORPHINE SULFATE 2 MG/ML
2 INJECTION, SOLUTION INTRAMUSCULAR; INTRAVENOUS ONCE
Status: COMPLETED | OUTPATIENT
Start: 2018-12-07 | End: 2018-12-07

## 2018-12-07 RX ORDER — POTASSIUM CHLORIDE 7.45 MG/ML
10 INJECTION INTRAVENOUS
Status: DISCONTINUED | OUTPATIENT
Start: 2018-12-07 | End: 2018-12-13 | Stop reason: HOSPADM

## 2018-12-07 RX ORDER — SODIUM CHLORIDE 0.9 % (FLUSH) 0.9 %
3 SYRINGE (ML) INJECTION EVERY 12 HOURS SCHEDULED
Status: DISCONTINUED | OUTPATIENT
Start: 2018-12-07 | End: 2018-12-13 | Stop reason: HOSPADM

## 2018-12-07 RX ORDER — NITROGLYCERIN 0.4 MG/1
0.4 TABLET SUBLINGUAL AS NEEDED
Status: DISCONTINUED | OUTPATIENT
Start: 2018-12-07 | End: 2018-12-13 | Stop reason: HOSPADM

## 2018-12-07 RX ORDER — OMEGA-3S/DHA/EPA/FISH OIL/D3 300MG-1000
1000 CAPSULE ORAL DAILY
Status: DISCONTINUED | OUTPATIENT
Start: 2018-12-07 | End: 2018-12-13 | Stop reason: HOSPADM

## 2018-12-07 RX ORDER — MAGNESIUM SULFATE HEPTAHYDRATE 40 MG/ML
4 INJECTION, SOLUTION INTRAVENOUS AS NEEDED
Status: DISCONTINUED | OUTPATIENT
Start: 2018-12-07 | End: 2018-12-13 | Stop reason: HOSPADM

## 2018-12-07 RX ORDER — DRONABINOL 2.5 MG/1
5 CAPSULE ORAL
Status: DISCONTINUED | OUTPATIENT
Start: 2018-12-07 | End: 2018-12-07

## 2018-12-07 RX ORDER — PROCHLORPERAZINE MALEATE 10 MG
10 TABLET ORAL EVERY 4 HOURS PRN
Status: DISCONTINUED | OUTPATIENT
Start: 2018-12-07 | End: 2018-12-12

## 2018-12-07 RX ORDER — POTASSIUM CHLORIDE 1.5 G/1.77G
40 POWDER, FOR SOLUTION ORAL AS NEEDED
Status: DISCONTINUED | OUTPATIENT
Start: 2018-12-07 | End: 2018-12-13 | Stop reason: HOSPADM

## 2018-12-07 RX ORDER — ALBUTEROL SULFATE 2.5 MG/3ML
2.5 SOLUTION RESPIRATORY (INHALATION) EVERY 4 HOURS PRN
Status: CANCELLED | OUTPATIENT
Start: 2018-12-07

## 2018-12-07 RX ORDER — DEXTROSE MONOHYDRATE 25 G/50ML
25 INJECTION, SOLUTION INTRAVENOUS
Status: DISCONTINUED | OUTPATIENT
Start: 2018-12-07 | End: 2018-12-13 | Stop reason: HOSPADM

## 2018-12-07 RX ORDER — ONDANSETRON 2 MG/ML
4 INJECTION INTRAMUSCULAR; INTRAVENOUS EVERY 6 HOURS PRN
Status: DISCONTINUED | OUTPATIENT
Start: 2018-12-07 | End: 2018-12-11

## 2018-12-07 RX ORDER — COLCHICINE 0.6 MG/1
0.6 TABLET ORAL DAILY
Status: DISCONTINUED | OUTPATIENT
Start: 2018-12-07 | End: 2018-12-13 | Stop reason: HOSPADM

## 2018-12-07 RX ORDER — ALBUTEROL SULFATE 2.5 MG/3ML
2.5 SOLUTION RESPIRATORY (INHALATION) 2 TIMES DAILY PRN
Status: CANCELLED | OUTPATIENT
Start: 2018-12-07

## 2018-12-07 RX ORDER — POLYETHYLENE GLYCOL 3350 17 G/17G
17 POWDER, FOR SOLUTION ORAL DAILY PRN
Status: DISCONTINUED | OUTPATIENT
Start: 2018-12-07 | End: 2018-12-13 | Stop reason: HOSPADM

## 2018-12-07 RX ADMIN — CHOLECALCIFEROL TAB 10 MCG (400 UNIT) 1000 UNITS: 10 TAB at 10:10

## 2018-12-07 RX ADMIN — DRONABINOL 5 MG: 2.5 CAPSULE ORAL at 10:10

## 2018-12-07 RX ADMIN — MORPHINE SULFATE 30 MG: 30 TABLET, EXTENDED RELEASE ORAL at 20:37

## 2018-12-07 RX ADMIN — MORPHINE SULFATE 2 MG: 2 INJECTION, SOLUTION INTRAMUSCULAR; INTRAVENOUS at 14:32

## 2018-12-07 RX ADMIN — SODIUM CHLORIDE, PRESERVATIVE FREE 3 ML: 5 INJECTION INTRAVENOUS at 08:11

## 2018-12-07 RX ADMIN — FLUOXETINE 20 MG: 20 CAPSULE ORAL at 10:10

## 2018-12-07 RX ADMIN — MORPHINE SULFATE 30 MG: 30 TABLET, EXTENDED RELEASE ORAL at 10:10

## 2018-12-07 RX ADMIN — CEFEPIME 2 G: 2 INJECTION, POWDER, FOR SOLUTION INTRAVENOUS at 08:11

## 2018-12-07 RX ADMIN — SODIUM CHLORIDE, PRESERVATIVE FREE 3 ML: 5 INJECTION INTRAVENOUS at 20:38

## 2018-12-07 RX ADMIN — PIOGLITAZONE 30 MG: 15 TABLET ORAL at 10:10

## 2018-12-07 RX ADMIN — PANTOPRAZOLE SODIUM 40 MG: 40 TABLET, DELAYED RELEASE ORAL at 10:10

## 2018-12-07 RX ADMIN — COLCHICINE 0.6 MG: 0.6 TABLET, FILM COATED ORAL at 10:10

## 2018-12-07 RX ADMIN — METHYLPREDNISOLONE SODIUM SUCCINATE 125 MG: 125 INJECTION, POWDER, FOR SOLUTION INTRAMUSCULAR; INTRAVENOUS at 02:16

## 2018-12-07 RX ADMIN — LEUCINE, PHENYLALANINE, LYSINE, METHIONINE, ISOLEUCINE, VALINE, HISTIDINE, THREONINE, TRYPTOPHAN, ALANINE, GLYCINE, ARGININE, PROLINE, SERINE, TYROSINE, SODIUM ACETATE, DIBASIC POTASSIUM PHOSPHATE, MAGNESIUM CHLORIDE, SODIUM CHLORIDE, CALCIUM CHLORIDE, DEXTROSE
365; 280; 290; 200; 300; 290; 240; 210; 90; 1035; 515; 575; 340; 250; 20; 340; 261; 51; 59; 33; 20 INJECTION INTRAVENOUS at 17:44

## 2018-12-07 RX ADMIN — PROMETHAZINE HYDROCHLORIDE 12.5 MG: 25 INJECTION INTRAMUSCULAR; INTRAVENOUS at 15:29

## 2018-12-07 RX ADMIN — MAGNESIUM SULFATE HEPTAHYDRATE 4 G: 40 INJECTION, SOLUTION INTRAVENOUS at 20:37

## 2018-12-07 RX ADMIN — APIXABAN 5 MG: 5 TABLET, FILM COATED ORAL at 10:10

## 2018-12-07 RX ADMIN — FLUOXETINE 20 MG: 20 CAPSULE ORAL at 20:37

## 2018-12-07 RX ADMIN — CEFEPIME 2 G: 2 INJECTION, POWDER, FOR SOLUTION INTRAVENOUS at 16:54

## 2018-12-07 RX ADMIN — FLECAINIDE ACETATE 50 MG: 50 TABLET ORAL at 10:10

## 2018-12-07 RX ADMIN — MONTELUKAST SODIUM 10 MG: 10 TABLET, COATED ORAL at 10:10

## 2018-12-07 RX ADMIN — FLECAINIDE ACETATE 50 MG: 50 TABLET ORAL at 20:36

## 2018-12-07 RX ADMIN — ONDANSETRON 4 MG: 4 TABLET, FILM COATED ORAL at 21:43

## 2018-12-07 RX ADMIN — INSULIN ASPART 3 UNITS: 100 INJECTION, SOLUTION INTRAVENOUS; SUBCUTANEOUS at 17:47

## 2018-12-07 RX ADMIN — PIPERACILLIN SODIUM,TAZOBACTAM SODIUM 3.38 G: 3; .375 INJECTION, POWDER, FOR SOLUTION INTRAVENOUS at 01:50

## 2018-12-07 RX ADMIN — ALLOPURINOL 100 MG: 100 TABLET ORAL at 10:10

## 2018-12-07 RX ADMIN — DRONABINOL 5 MG: 2.5 CAPSULE ORAL at 16:54

## 2018-12-07 RX ADMIN — PROMETHAZINE HYDROCHLORIDE 25 MG: 25 INJECTION, SOLUTION INTRAMUSCULAR; INTRAVENOUS at 15:29

## 2018-12-07 RX ADMIN — I.V. FAT EMULSION 50 G: 20 EMULSION INTRAVENOUS at 17:45

## 2018-12-07 RX ADMIN — INSULIN ASPART 4 UNITS: 100 INJECTION, SOLUTION INTRAVENOUS; SUBCUTANEOUS at 11:38

## 2018-12-07 RX ADMIN — APIXABAN 5 MG: 5 TABLET, FILM COATED ORAL at 20:36

## 2018-12-07 RX ADMIN — PROMETHAZINE HYDROCHLORIDE 25 MG: 25 INJECTION INTRAMUSCULAR; INTRAVENOUS at 15:29

## 2018-12-07 RX ADMIN — MICAFUNGIN SODIUM 100 MG: 20 INJECTION, POWDER, LYOPHILIZED, FOR SOLUTION INTRAVENOUS at 06:27

## 2018-12-07 RX ADMIN — VANCOMYCIN HYDROCHLORIDE 1750 MG: 5 INJECTION, POWDER, LYOPHILIZED, FOR SOLUTION INTRAVENOUS at 02:16

## 2018-12-07 RX ADMIN — ONDANSETRON 4 MG: 2 INJECTION, SOLUTION INTRAMUSCULAR; INTRAVENOUS at 14:30

## 2018-12-07 NOTE — ED PROVIDER NOTES
Subjective   68yomale with history of DM2, afib, HTN, COPD, anemia of chronic disease, and pancreatic cancer (dx 8/18) presents to the ED with family with a temperature of 101.7 axillary at home; worsening fatigue, congestion, and just not feeling well. PT has had bactremia x2 since his cancer diagnosis and has had recurrent cholangitis - pt was getting neoadjuvant chemo- gemcitabine and abraxane - last tx was 11/13 - pt was having worsening appetite and nausea and vomiting- so Dr Alves and Dr Rivas decided to stop the treatment for 2 weeks and place pt on TPN and then if pt is improved consider the resection of the tumor per patient family. Pt saw oncology on Tuesday and was doing better then yesterday and today started having decreased appetite and fatigue and cough then developed a temperature today and they came to the ED.    ESBL Ecoli bacteremia (August) and Klebsiella bacteremia (October)        History provided by:  Patient, relative and spouse  Fever   Max temp prior to arrival:  101.7  Temp source:  Axillary  Severity:  Mild  Onset quality:  Gradual  Timing:  Constant  Progression:  Unchanged  Chronicity:  New  Relieved by:  Nothing  Worsened by:  Nothing  Ineffective treatments:  None tried  Associated symptoms: chest pain, confusion, congestion, cough, nausea and vomiting    Associated symptoms: no dysuria    Risk factors: hx of cancer and immunosuppression        Review of Systems   Constitutional: Positive for fever.   HENT: Positive for congestion.    Respiratory: Positive for cough.    Cardiovascular: Positive for chest pain.   Gastrointestinal: Positive for nausea and vomiting. Negative for abdominal pain.   Endocrine: Negative.    Genitourinary: Negative.  Negative for dysuria.   Skin: Negative.    Neurological: Negative.    Psychiatric/Behavioral: Positive for confusion.   All other systems reviewed and are negative.      Past Medical History:   Diagnosis Date   • Antral gastritis    • Asthma     • Atrial fibrillation (CMS/HCC)     treated with oral blood thinner   • Chest pain    • COPD (chronic obstructive pulmonary disease) (CMS/HCC)    • Coronary artery disease     no stents   • Diabetes mellitus (CMS/HCC)     type 2    • Duodenitis    • Elevated cholesterol    • Emphysema of lung (CMS/HCC)    • Gallbladder disease     removed    • GERD (gastroesophageal reflux disease)    • Hyperlipidemia    • Hypertension    • Jaundice    • Kidney stone    • Kidney stones     had lithotripsy and passed 36 kidney stones as well as had one surgically removed.   • Malignant neoplasm of head of pancreas (CMS/HCC) 9/5/2018   • Palpitations    • Pneumonia 08/2018   • Reflux esophagitis    • Renal failure     stage 3 kidney disease   • Sepsis (CMS/HCC)        Allergies   Allergen Reactions   • Contrast Dye Other (See Comments)     Can't have due to kidney failure per family       Past Surgical History:   Procedure Laterality Date   • BILE DUCT STENT PLACEMENT      [facility] 2 weeks ago    • CARDIAC CATHETERIZATION  11/01/1999   • CARDIOVASCULAR STRESS TEST  09/2012   • COLONOSCOPY     • CYSTOSCOPY BLADDER STONE LITHOTRIPSY     • ECHO - CONVERTED  09/2012   • KIDNEY STONE SURGERY     • KIDNEY STONE SURGERY      open abdominal surgery   • UPPER GASTROINTESTINAL ENDOSCOPY  08/30/2012       Family History   Problem Relation Age of Onset   • Heart attack Mother    • Heart disease Mother    • Stroke Mother    • Kidney disease Mother    • Heart failure Mother    • Lung disease Father    • Tuberculosis Father    • Diabetes Sister    • Heart attack Brother    • Heart failure Brother    • Heart disease Brother    • Diabetes Sister    • No Known Problems Brother    • No Known Problems Brother        Social History     Socioeconomic History   • Marital status:      Spouse name: Not on file   • Number of children: Not on file   • Years of education: Not on file   • Highest education level: Not on file   Tobacco  Use   • Smoking status: Never Smoker   • Smokeless tobacco: Never Used   Substance and Sexual Activity   • Alcohol use: No   • Drug use: No   • Sexual activity: Defer     Partners: Female   Social History Narrative    He is  and lives alone with his wife although they have 3 children together who all live close by. He is retired from the [employer] and was exposed to Agent Orange during [place]. He is a lifelong nonsmoker.            Objective   Physical Exam   Constitutional: He appears ill.   HENT:   Head: Normocephalic and atraumatic.   Eyes: EOM are normal. Pupils are equal, round, and reactive to light.   Neck: Normal range of motion.   Cardiovascular: Regular rhythm and normal pulses.   Pulmonary/Chest: Effort normal. He has decreased breath sounds in the right middle field and the right lower field.   Abdominal: Soft. He exhibits no distension and no mass. There is no tenderness. There is no guarding.   Musculoskeletal: Normal range of motion.        Right lower leg: Normal.        Left lower leg: Normal.   Neurological: He is alert.   Skin: Skin is warm. Capillary refill takes less than 2 seconds.   Psychiatric: He has a normal mood and affect.   Nursing note and vitals reviewed.      Procedures           ED Course  ED Course as of Dec 07 1950   Thu Dec 06, 2018   2351 EKG interpretation time is 2300 normal sinus rhythm 95 bpm QRS duration is 94 ms QT is 350 QTc is 439 no evidence of acute ischemic change  []   Fri Dec 07, 2018   0518 Reviewed case with Dr Harley will awaiting provider to return call from observation- who feels that pt would better benefit from observation  []   0557 DIscussed case with Dr Faye who agrees to place in observation- says he would have done cefepime and vanco  however zosyn is ok; would like me to add a dose of micafungin   [MH]      ED Course User Index  [MH] Aisha Wolfe DO                  MDM  Number of Diagnoses or Management Options  Fever,  unspecified fever cause: new and requires workup     Amount and/or Complexity of Data Reviewed  Clinical lab tests: ordered and reviewed  Tests in the radiology section of CPT®: ordered and reviewed  Tests in the medicine section of CPT®: ordered and reviewed  Decide to obtain previous medical records or to obtain history from someone other than the patient: yes  Discuss the patient with other providers: yes  Independent visualization of images, tracings, or specimens: yes    Risk of Complications, Morbidity, and/or Mortality  Presenting problems: high  Diagnostic procedures: high  Management options: high    Critical Care  Total time providing critical care: 30-74 minutes (40 minutes of critical care provided. This time excludes other billable procedures. Time does include preparation of documents, medical consultations, review of old records, and direct bedside care. Patient was at high risk for life-threatening deterioration due to recurrent bactremia from being immunocompromised from pancreatic cancer and currently receiving TPN and had a fever of 101.7 at home.   )    Patient Progress  Patient progress: (guarded)        Final diagnoses:   Fever, unspecified fever cause            Aisha Wolfe DO  12/07/18 2017

## 2018-12-07 NOTE — ED NOTES
Called Dr Faye for Dr Wolfe. He did not answer so I left him a voicemail to call back.     Aisha Hobbs  12/07/18 2403

## 2018-12-07 NOTE — PROGRESS NOTES
Nutrition Services    Patient Name:  Todd Phillips  YOB: 1950  MRN: [id number]  Admit Date:  12/6/2018    Consult for TPN assessment.  Recommend Clinimix (20% dextrose, 5% AA) @ 80 ml/hr, with 250 ml of 20% lipids 3 x/week.  This will provide 1690 kcals without lipids and 2190 kcals with lipids, which will meet estimated kcal/protein needs.     Note will d/c cardiac, renal diet due to poor intakes.        Thank you    Electronically signed by:  Amairani Cho RD  12/07/18 2:07 PM

## 2018-12-07 NOTE — H&P
HISTORY AND PHYSICAL        Patient Identification:  Name:  Todd Phillips  Age:  68 y.o.  Sex:  male  :  1950  MRN:  [id number]   Visit Number:  [id number]  Primary Care Physician:  Adiel Denney MD       Subjective     Subjective     Chief complaint:     Chief Complaint   Patient presents with   • Chest Pain   • Fever       History of presenting illness:     Mr. Phillips is a 68 year old male with history of A -fib, COPD, CAD, DM, GERD, hypertension, kidney stones, pancreatic cancer, history of sepsis, and renal failure. He has recently been receiving chemotherapy but had to stop due to his acute onset of fever, and worsening weakness. Wife reports fever at home of 101.7, worsening weakness, and decreased po intake. He was recently started on TPN per oncology team. Near future plans for Whipple procedure reported per wife.White count is normal, CRP level is 3.15, Lactic acid on admission was 2.5. He has a port a cath that was placed in . It is currently accessed and without erythema or drainage.        ---------------------------------------------------------------------------------------------------------------------     Review Of Systems:    Constitutional:  fever, chills , decreased appetite ,  fatigue.  Eyes: no eye drainage, itching or redness.  HEENT: no mouth sores, dysphagia or nose bleed.  Respiratory: See HPI  Cardiovascular:  See HPI  Gastrointestinal: See HPI  Genitourinary: no dysuria or polyuria.  Hematologic/lymphatic: See HPI  Musculoskeletal: no muscle or joint pain.  Skin: No rash and no itching.  Neurological: no loss of consciousness, no seizure, no headache.  Behavioral/Psych: no depression or suicidal ideation.  Endocrine: no hot flashes.  Immunologic: See HPI  ---------------------------------------------------------------------------------------------------------------------     Past Medical History    Past Medical History:   Diagnosis Date   • Antral  gastritis    • Asthma    • Atrial fibrillation (CMS/HCC)     treated with oral blood thinner   • Chest pain    • COPD (chronic obstructive pulmonary disease) (CMS/HCC)    • Coronary artery disease     no stents   • Diabetes mellitus (CMS/HCC)     type 2    • Duodenitis    • Elevated cholesterol    • Emphysema of lung (CMS/HCC)    • Gallbladder disease     removed    • GERD (gastroesophageal reflux disease)    • Hyperlipidemia    • Hypertension    • Jaundice    • Kidney stone    • Kidney stones     had lithotripsy and passed 36 kidney stones as well as had one surgically removed.   • Malignant neoplasm of head of pancreas (CMS/HCC) 9/5/2018   • Palpitations    • Pneumonia 08/2018   • Reflux esophagitis    • Renal failure     stage 3 kidney disease   • Sepsis (CMS/HCC)        Past Surgical History    Past Surgical History:   Procedure Laterality Date   • BILE DUCT STENT PLACEMENT      [facility] 2 weeks ago    • CARDIAC CATHETERIZATION  11/01/1999   • CARDIOVASCULAR STRESS TEST  09/2012   • COLONOSCOPY     • CYSTOSCOPY BLADDER STONE LITHOTRIPSY     • ECHO - CONVERTED  09/2012   • KIDNEY STONE SURGERY     • KIDNEY STONE SURGERY      open abdominal surgery   • UPPER GASTROINTESTINAL ENDOSCOPY  08/30/2012       Family History    Family History   Problem Relation Age of Onset   • Heart attack Mother    • Heart disease Mother    • Stroke Mother    • Kidney disease Mother    • Heart failure Mother    • Lung disease Father    • Tuberculosis Father    • Diabetes Sister    • Heart attack Brother    • Heart failure Brother    • Heart disease Brother    • Diabetes Sister    • No Known Problems Brother    • No Known Problems Brother        Social History    Social History     Tobacco Use   • Smoking status: Never Smoker   • Smokeless tobacco: Never Used   Substance Use Topics   • Alcohol use: No   • Drug use: No       Allergies    Contrast  dye  ---------------------------------------------------------------------------------------------------------------------     Home Medications:    Prior to Admission Medications     Prescriptions Last Dose Informant Patient Reported? Taking?    albuterol (PROVENTIL HFA;VENTOLIN HFA) 108 (90 BASE) MCG/ACT inhaler 12/6/2018 Spouse/Significant Other Yes Yes    Inhale 2 puffs Every 4 (Four) Hours As Needed for Wheezing or Shortness of Air.    albuterol (PROVENTIL) (2.5 MG/3ML) 0.083% nebulizer solution 12/6/2018 Spouse/Significant Other Yes Yes    Take 2.5 mg by nebulization 2 (Two) Times a Day As Needed for Wheezing or Shortness of Air.    allopurinol (ZYLOPRIM) 100 MG tablet 12/6/2018 Spouse/Significant Other Yes Yes    Take 100 mg by mouth Daily.    apixaban (ELIQUIS) 5 MG tablet tablet 12/6/2018 Spouse/Significant Other No Yes    Take 1 tablet by mouth Every 12 (Twelve) Hours.    cholecalciferol (VITAMIN D3) 1000 units tablet 12/6/2018 Spouse/Significant Other Yes Yes    Take 1,000 Units by mouth Daily.    colchicine 0.6 MG tablet 12/6/2018 Spouse/Significant Other No Yes    Take 1 tablet by mouth Daily.    dronabinol (MARINOL) 5 MG capsule 12/6/2018 Spouse/Significant Other No Yes    Take 1 capsule by mouth 2 (Two) Times a Day Before Meals.    flecainide (TAMBOCOR) 50 MG tablet 12/6/2018 Spouse/Significant Other No Yes    Take 1 tablet by mouth Every 12 (Twelve) Hours.    FLUoxetine (PROzac) 20 MG capsule 12/6/2018 Spouse/Significant Other Yes Yes    Take 20 mg by mouth 2 (Two) Times a Day.    lansoprazole (PREVACID) 30 MG capsule 12/6/2018 Spouse/Significant Other Yes Yes    Take 30 mg by mouth Daily.    linaclotide (LINZESS) 145 MCG capsule capsule 12/6/2018 Spouse/Significant Other No Yes    Take 1 capsule by mouth Every Morning Before Breakfast.    LORazepam (ATIVAN) 0.5 MG tablet 12/6/2018 Spouse/Significant Other No Yes    Take one to two tablets by mouth every 4 to 6 hours for nausea.    Patient taking  differently:  Take 0.5 mg by mouth Every 6 (Six) Hours As Needed for Anxiety. Take one to two tablets by mouth every 4 to 6 hours for nausea.    mometasone (ASMANEX) 220 MCG/INH inhaler 12/6/2018 Spouse/Significant Other Yes Yes    Inhale 2 puffs Every Night.    montelukast (SINGULAIR) 10 MG tablet 12/6/2018 Spouse/Significant Other Yes Yes    Take 10 mg by mouth Daily.    Morphine (MS CONTIN) 30 MG 12 hr tablet 12/6/2018 Spouse/Significant Other No Yes    Take 1 tablet by mouth Every 12 (Twelve) Hours.    oxyCODONE (ROXICODONE) 5 MG immediate release tablet 12/6/2018 Spouse/Significant Other Yes Yes    Take 15 mg by mouth Every 6 (Six) Hours As Needed for Moderate Pain .    pioglitazone (ACTOS) 30 MG tablet 12/6/2018 Spouse/Significant Other Yes Yes    Take 30 mg by mouth Daily.    polyethylene glycol (MIRALAX) packet 12/6/2018 Spouse/Significant Other Yes Yes    Take 17 g by mouth Daily As Needed.    prochlorperazine (COMPAZINE) 10 MG tablet 12/6/2018 Spouse/Significant Other No Yes    Take 1 tablet by mouth Every 6 (Six) Hours As Needed for Nausea or Vomiting.    Patient taking differently:  Take 10 mg by mouth Every 4 (Four) Hours As Needed for Nausea or Vomiting.    Tiotropium Bromide-Olodaterol 2.5-2.5 MCG/ACT aerosol solution 12/6/2018 Spouse/Significant Other Yes Yes    Inhale 2 puffs Daily.    granisetron (SANCUSO) 3.1 MG/24HR 12/4/2018 Spouse/Significant Other No No    Place 1 patch on the skin as directed by provider Every 7 (Seven) Days.    nitroglycerin (NITROSTAT) 0.4 MG SL tablet Unknown Spouse/Significant Other Yes No    Place 0.4 mg under the tongue as needed for chest pain.        ---------------------------------------------------------------------------------------------------------------------    Objective     Objective     Hospital Scheduled Meds:    allopurinol 100 mg Oral Daily   apixaban 5 mg Oral Q12H   cefepime 2 g Intravenous Q8H   cholecalciferol 1,000 Units Oral Daily   colchicine 0.6  mg Oral Daily   dronabinol 5 mg Oral BID AC   flecainide 50 mg Oral Q12H   FLUoxetine 20 mg Oral BID   insulin aspart 0-7 Units Subcutaneous 4x Daily AC & at Bedtime   linaclotide 145 mcg Oral QAM AC   mometasone 2 puff Inhalation Nightly   montelukast 10 mg Oral Daily   Morphine 30 mg Oral Q12H   Morphine 2 mg Intravenous Once   pantoprazole 40 mg Oral QAM   pioglitazone 30 mg Oral Daily   sodium chloride 3 mL Intravenous Q12H   tiotropium bromide-olodaterol 2 puff Inhalation Daily       Pharmacy Consult      ---------------------------------------------------------------------------------------------------------------------   Vital Signs:  Temp:  [97.7 °F (36.5 °C)-99 °F (37.2 °C)] 98.3 °F (36.8 °C)  Heart Rate:  [65-97] 92  Resp:  [16-18] 18  BP: (100-157)/(61-95) 157/87  Mean Arterial Pressure (Non-Invasive) for the past 24 hrs (Last 3 readings):   Noninvasive MAP (mmHg)   12/07/18 0708 107   12/07/18 0627 97   12/07/18 0612 111     SpO2 Percentage    12/07/18 0612 12/07/18 0627 12/07/18 0708   SpO2: 99% 97% 97%     SpO2:  [93 %-100 %] 97 %  on   ;   Device (Oxygen Therapy): room air    Body mass index is 26.31 kg/m².  Wt Readings from Last 3 Encounters:   12/06/18 88 kg (194 lb)   12/04/18 88.4 kg (194 lb 12.8 oz)   11/27/18 90.7 kg (200 lb)     ---------------------------------------------------------------------------------------------------------------------     Physical Exam:    Constitutional:  Very drowsy, Pale, frail.  No respiratory distress.      HENT:  Head: Normocephalic and atraumatic.  Mouth:  Moist mucous membranes.    Eyes:  Conjunctivae and EOM are normal.  No scleral icterus.  Neck:  Neck supple.  No JVD present.    Cardiovascular:  Normal rate, regular rhythm and normal heart sounds with no murmur. No edema.  Pulmonary/Chest:  No respiratory distress, no wheezes, no crackles, with normal breath sounds and good air movement.  Abdominal:  Soft.  Bowel sounds are normal.  No distension and no  tenderness.   Musculoskeletal:  No edema, no tenderness, and no deformity.  No swelling or redness of joints.  Neurological:  Alert and oriented to person, place, and time.  No facial droop.  No slurred speech.   Skin:  Skin is warm and dry. Pale  Psychiatric:   Very drowsy Normal mood and affect.  Behavior is normal.    ---------------------------------------------------------------------------------------------------------------------  I have personally reviewed the EKG/Telemetry strip  ---------------------------------------------------------------------------------------------------------------------   Results from last 7 days   Lab Units  12/06/18   2305   TROPONIN I ng/mL  0.007         Results from last 7 days   Lab Units  12/06/18   2319   PH, ARTERIAL pH units  7.530*   PO2 ART mm Hg  74.8*   PCO2, ARTERIAL mm Hg  29.7*   HCO3 ART mmol/L  24.3     Results from last 7 days   Lab Units  12/07/18   0705  12/06/18   2305  12/04/18   1149   CRP mg/dL  3.15*  2.00*   --    LACTATE mmol/L  2.5*  1.3   --    WBC 10*3/mm3  10.68  10.72  11.36   HEMOGLOBIN g/dL  11.3*  11.3*  12.5*   HEMATOCRIT %  34.6*  33.9*  38.1*   MCV fL  91.3  89.4  90.5   MCHC g/dL  32.7*  33.3  32.8*   PLATELETS 10*3/mm3  282  323  564*   INR    --   1.56*   --      Results from last 7 days   Lab Units  12/07/18   0705  12/06/18   2305  12/04/18   1149   SODIUM mmol/L  131*  132*  136   POTASSIUM mmol/L  4.2  4.2  4.1   MAGNESIUM mg/dL  1.7   --    --    CHLORIDE mmol/L  102  99  100   CO2 mmol/L  20.6*  24.0*  28.8   BUN mg/dL  30*  33*  36*   CREATININE mg/dL  1.24  1.27  1.37*   EGFR IF NONAFRICN AM mL/min/1.73  58*  56*  51*   CALCIUM mg/dL  8.6  9.1  9.8   GLUCOSE mg/dL  251*  172*  128*   ALBUMIN g/dL   --   3.80  4.20   BILIRUBIN mg/dL   --   0.4  0.3   ALK PHOS U/L   --   102  118   AST (SGOT) U/L   --   18  21   ALT (SGPT) U/L   --   18  16   Estimated Creatinine Clearance: 69 mL/min (by C-G formula based on SCr of 1.24  mg/dL).  Ammonia   Date Value Ref Range Status   12/06/2018 20 16 - 60 umol/L Final       Glucose   Date/Time Value Ref Range Status   12/07/2018 0650 252 (H) 70 - 130 mg/dL Final     Lab Results   Component Value Date    HGBA1C 6.40 (H) 11/18/2018     Lab Results   Component Value Date    TSH 1.502 11/18/2018    FREET4 1.14 10/30/2018                      Pain Management Panel     Pain Management Panel Latest Ref Rng & Units 10/16/2018 9/12/2018    CREATININE UR mg/dL 208.8 -    AMPHETAMINES SCREEN, URINE Negative - Negative    BARBITURATES SCREEN Negative - Negative    BENZODIAZEPINE SCREEN, URINE Negative - Negative    BUPRENORPHINE Negative - Negative    COCAINE SCREEN, URINE Negative - Negative    METHADONE SCREEN, URINE Negative - Negative        I have personally reviewed the above laboratory results.   ---------------------------------------------------------------------------------------------------------------------  Imaging Results (last 7 days)     Procedure Component Value Units Date/Time    XR Chest 1 View [[id number]] Collected:  12/07/18 0737     Updated:  12/07/18 1009    Narrative:       XR CHEST 1 VIEW-     CLINICAL INDICATION: fever          COMPARISON: 10/30/2018      TECHNIQUE: Single frontal view of the chest.     FINDINGS:     Left basilar atelectasis.  The cardiac silhouette is normal. The pulmonary vasculature is  unremarkable.  There is no evidence of an acute osseous abnormality.   There are no suspicious-appearing parenchymal soft tissue nodules.            Impression:       Left basilar atelectasis.         This report was finalized on 12/7/2018 10:07 AM by Dr. Ernesto Bell MD.       XR Abdomen Flat & Upright [[id number]] Collected:  12/07/18 0737     Updated:  12/07/18 1002    Narrative:       XR ABDOMEN FLAT AND UPRIGHT-     CLINICAL INDICATION: constipation          COMPARISON: None available.      FINDINGS:  Chest:  The included view of the chest demonstrates the lungs to be  well  aerated. There is no focal alveolar infiltrate or pleural effusion. The  cardiac silhouette is normal. No acute osseous abnormality is seen  within the chest.     Abdomen:  Two views of the abdomen show no free air. I do not see abnormal bowel  distention to suggest complete obstruction. The bony structures are  intact. No abnormal soft tissue masses are seen. No suspicious appearing  calcifications are identified on today's exam.       Impression:       1. Moderate stool in the colon.  2. The lungs are clear.  3. No evidence of bowel obstruction.  4. No free air.               This report was finalized on 12/7/2018 10:00 AM by Dr. Ernesto Bell MD.       CT Head Without Contrast [[id number]] Collected:  12/07/18 0737     Updated:  12/07/18 0950    Narrative:       CT HEAD WITHOUT CONTRAST-     CLINICAL INDICATION: confusion          COMPARISON: 06/20/2017      TECHNIQUE: Axial images of the brain were obtained without intravenous  contrast.  Reformatted images were created in the sagittal and coronal  planes.     DOSE: 746.31 mGy.cm     Radiation dose reduction techniques were utilized per ALARA protocol.  Automated exposure control was initiated through either or CareDose or  DoseRigSimple Star software packages by  protocol.           FINDINGS:   Today's study shows no mass, hemorrhage, or midline shift.   The ventricles, cisterns, and sulci are unremarkable. There is no  hydrocephalus.   There is no evidence of acute ischemia.  I do not see epidural or subdural hematoma.  The gray-white differentiation is appropriate.   The bone window setting images show no destructive calvarial lesion or  acute calvarial fracture.   The posterior fossa is unremarkable.             Impression:       No acute intracranial pathology. Nothing is seen on this exam to  specifically account for the patient's symptoms.     This report was finalized on 12/7/2018 9:48 AM by Dr. Ernesto Bell MD.           I have personally  reviewed the above radiology results.   ---------------------------------------------------------------------------------------------------------------------      Assessment & Plan        Assessment/Plan       ASSESSMENT:    1. Severe sepsis with lactic acid greater than 2 on admission  2. Fever of unknown origin  3. Immunosupression      PLAN:    Mr. Phillips is a 68 year old male with history of A -fib, COPD, CAD, DM, GERD, hypertension, kidney stones, pancreatic cancer, history of sepsis, and renal failure. He has recently been receiving chemotherapy but had to stop due to his acute onset of fever, and worsening weakness. Wife reports fever at home of 101.7, worsening weakness, and decreased po intake. He was recently started on TPN per oncology team. Near future plans for Whipple procedure reported per wife.White count is normal, CRP level is 3.15, Lactic acid on admission was 2.5. He has a port a cath that was placed in October, 2018. It is currently accessed and without erythema or drainage.      Patient had a prior episode of bacteremia and is at very high risk for relapsing bacteremia.    In the setting of risk of relapsing bacteremia vancomycin pharmacy to dose and Rocephin 2 gm IV q 24 hrs, and Micafungin 100 mg IV q 24 hrs was initiated.    Continue TPN as previously recommended per oncology team.     Continue to monitor blood cultures closely.     Patient's findings and recommendations were discussed with patient and nursing staff      Code Status:   Code Status and Medical Interventions:   Ordered at: 12/07/18 0635     Code Status:    CPR     Medical Interventions (Level of Support Prior to Arrest):    Full           Marianela Zepeda, VIMAL  12/07/18  10:36 AM

## 2018-12-07 NOTE — ED NOTES
Called Dr Faye again for Dr Wolfe with no answer. I left another voicemail to return her call.     Aisha Hobbs  12/07/18 [id number]

## 2018-12-07 NOTE — ED NOTES
Attempted to call Dr Faye again with no answer. All attempts have been to cell # [phone].     Aisha Hobbs  12/07/18 0518

## 2018-12-07 NOTE — PHARMACY PATIENT ASSISTANCE
Pharmacy was asked to check the price of this patient's Eliquis which is a home medication. [facility]. Phillips is currently [facility] and we were able to verify that the patient receives this medication at no cost to him. No price barriers or compliance issues exist at this time.  Thank you,   Abdifatah Zurita  12/07/18  11:26 AM

## 2018-12-07 NOTE — CONSULTS
Name:  Todd Phillips  :  1950    DATE OF ADMISSION  2018    DATE OF CONSULT  2018    PRIMARY CARE PHYSICIAN  Adiel Denney MD    REASON FOR CONSULT  Pancreatic cancer    CHIEF COMPLAINT:  Chief Complaint   Patient presents with   • Chest Pain   • Fever     HISTORY OF PRESENT ILLNESS:   Todd Phillips is a 68 y.o. male, known to our clinic, being followed by Dr. Alves, who is being seen today in consultation given his history of pancreatic cancer. In regards to his oncology history, please see most recent follow up note dated 18 for full details. In brief, he was recently started on neoadjuvant treatment with Gemzar/Abraxane and completed his first cycle in mid-November. Unfortunately, he has been struggling with ongoing nausea/vomiting and dehydration. As a result, he has very poor nutrition and his symptoms have made it very difficult to treat him. Therefore, we started him on TPN per Dr. Hale's recommendations a couple of weeks ago which has been very helpful. We saw him in clinic earlier this week as above and overall, he was feeling much better but did give him IVF for dehydration. Chemotherapy has been on hold and plan was to follow up with Dr. Hale early next week with repeat imaging and possibly schedule surgery at that time. Patient's wife is at bedside and says a couple of days ago, he began having worsening fatigue, weakness and cough. She says he then developed a fever with axillary temp of 101.7 and presented to ER for further evaluation. Of note, patient also has history of bacteremia and cholangitis since diagnosis and high risk for infection (positive lactic acid).  Patient currently in observation and has been started on broad spectrum antibiotics. He is resting comfortably in bed. He complains of fatigue, weakness and abdominal pain. At present, denies dyspnea, cough, CP and nausea. He does have constipation controlled with stool softeners. He continues to  take Marinol 5 mg BID. For nausea, he uses Sancuso patch and his wife gives him Compazine and Ativan 0.5 mg (1-2 tabs) scheduled every 4 hours. Although, she says she has caught him not taking some of his medications lately as he has been confused.     PAST MEDICAL HISTORY  Past Medical History:   Diagnosis Date   • Antral gastritis    • Asthma    • Atrial fibrillation (CMS/[facility])     treated with oral blood thinner   • Chest pain    • COPD (chronic obstructive pulmonary disease) (CMS/[facility])    • Coronary artery disease     no stents   • Diabetes mellitus (CMS/[facility])     type 2    • Duodenitis    • Elevated cholesterol    • Emphysema of lung (CMS/[facility])    • Gallbladder disease     removed    • GERD (gastroesophageal reflux disease)    • Hyperlipidemia    • Hypertension    • Jaundice    • Kidney stone    • Kidney stones     had lithotripsy and passed 36 kidney stones as well as had one surgically removed.   • Malignant neoplasm of head of pancreas (CMS/[facility]) 9/5/2018   • Palpitations    • Pneumonia 08/2018   • Reflux esophagitis    • Renal failure     stage 3 kidney disease   • Sepsis (CMS/[facility])        PAST SURGICAL HISTORY  Past Surgical History:   Procedure Laterality Date   • BILE DUCT STENT PLACEMENT      [facility] 2 weeks ago    • CARDIAC CATHETERIZATION  11/01/1999   • CARDIOVASCULAR STRESS TEST  09/2012   • COLONOSCOPY     • CYSTOSCOPY BLADDER STONE LITHOTRIPSY     • ECHO - CONVERTED  09/2012   • KIDNEY STONE SURGERY     • KIDNEY STONE SURGERY      open abdominal surgery   • UPPER GASTROINTESTINAL ENDOSCOPY  08/30/2012       SOCIAL HISTORY  Social History     Socioeconomic History   • Marital status:      Spouse name: Not on file   • Number of children: Not on file   • Years of education: Not on file   • Highest education level: Not on file   Social Needs   • Financial resource strain: Not on file   • Food insecurity - worry: Not on file   • Food insecurity - inability: Not on file   •  Transportation needs - medical: Not on file   • Transportation needs - non-medical: Not on file   Occupational History   • Not on file   Tobacco Use   • Smoking status: Never Smoker   • Smokeless tobacco: Never Used   Substance and Sexual Activity   • Alcohol use: No   • Drug use: No   • Sexual activity: Defer     Partners: Female   Other Topics Concern   • Not on file   Social History Narrative    He is  and lives alone with his wife although they have 3 children together who all live close by. He is retired from the [employer] and was exposed to Agent Orange during [place]. He is a lifelong nonsmoker.        FAMILY HISTORY  Family History   Problem Relation Age of Onset   • Heart attack Mother    • Heart disease Mother    • Stroke Mother    • Kidney disease Mother    • Heart failure Mother    • Lung disease Father    • Tuberculosis Father    • Diabetes Sister    • Heart attack Brother    • Heart failure Brother    • Heart disease Brother    • Diabetes Sister    • No Known Problems Brother    • No Known Problems Brother        ALLERGIES  Allergies   Allergen Reactions   • Contrast Dye Other (See Comments)     Can't have due to kidney failure per family       INPATIENT MEDICATIONS  Current Facility-Administered Medications   Medication Dose Route Frequency Provider Last Rate Last Dose   • acetaminophen (TYLENOL) tablet 650 mg  650 mg Oral Q4H PRN Davin Faye MD       • albuterol (PROVENTIL) nebulizer solution 0.083% 2.5 mg/3mL  2.5 mg Nebulization Q4H PRN Davin Faye MD       • allopurinol (ZYLOPRIM) tablet 100 mg  100 mg Oral Daily Davin Faye MD       • aluminum-magnesium hydroxide-simethicone (MAALOX MAX) 400-400-40 MG/5ML suspension 15 mL  15 mL Oral Q6H PRN Davin Faye MD       • apixaban (ELIQUIS) tablet 5 mg  5 mg Oral Q12H Davin Faye MD       • cefepime (MAXIPIME) 2 g/100 mL 0.9% NS (mbp)  2 g Intravenous Q8H Davin Faye MD       • cholecalciferol  (VITAMIN D3) tablet 1,000 Units  1,000 Units Oral Daily Davin Faye MD       • colchicine tablet 0.6 mg  0.6 mg Oral Daily Davin Faye MD       • dextrose (D50W) 25 g/ 50mL Intravenous Solution 25 g  25 g Intravenous Q15 Min PRN Davin Faye MD       • dextrose (GLUTOSE) oral gel 15 g  15 g Oral Q15 Min PRN Davin Faye MD       • dronabinol (MARINOL) capsule 5 mg  5 mg Oral BID AC Davin Faye MD       • flecainide (TAMBOCOR) tablet 50 mg  50 mg Oral Q12H Davin Faye MD       • FLUoxetine (PROzac) capsule 20 mg  20 mg Oral BID Davin Faye MD       • glucagon (human recombinant) (GLUCAGEN DIAGNOSTIC) injection 1 mg  1 mg Subcutaneous PRN Davin Faye MD       • insulin aspart (novoLOG) injection 0-7 Units  0-7 Units Subcutaneous 4x Daily AC & at Bedtime Davin Faye MD       • linaclotide (LINZESS) capsule 145 mcg  145 mcg Oral QAM AC Davin Faye MD       • LORazepam (ATIVAN) tablet 0.5 mg  0.5 mg Oral Q6H PRN Davin Faye MD       • Magnesium Sulfate 2 gram Bolus, followed by 8 gram infusion (total Mg dose 10 grams)- Mg less than or equal to 1mg/dL  2 g Intravenous PRN Davin Faye MD        Or   • Magnesium Sulfate 2 gram / 50mL Infusion (GIVE X 3 BAGS TO EQUAL 6GM TOTAL DOSE) - Mg 1.1 - 1.5 mg/dl  2 g Intravenous PRN Davin Faye MD        Or   • Magnesium Sulfate 4 gram infusion- Mg 1.6-1.9 mg/dL  4 g Intravenous PRN Davin Faye MD       • mometasone (ASMANEX) 220 MCG/INH inhaler 2 puff  2 puff Inhalation Nightly Davin Faye MD       • montelukast (SINGULAIR) tablet 10 mg  10 mg Oral Daily Davin Faye MD       • Morphine (MS CONTIN) 12 hr tablet 30 mg  30 mg Oral Q12H Davin Faye MD       • morphine injection 2 mg  2 mg Intravenous Once Aisha Wolfe,        • nitroglycerin (NITROSTAT) SL tablet 0.4 mg  0.4 mg Sublingual PRN Davin Faye MD       •  ondansetron (ZOFRAN) tablet 4 mg  4 mg Oral Q6H PRN Davin Faye MD        Or   • ondansetron ODT (ZOFRAN-ODT) disintegrating tablet 4 mg  4 mg Oral Q6H PRN Davin Faye MD        Or   • ondansetron (ZOFRAN) injection 4 mg  4 mg Intravenous Q6H PRN Davin Faye MD       • oxyCODONE (ROXICODONE) immediate release tablet 15 mg  15 mg Oral Q6H PRN Davin Faye MD       • pantoprazole (PROTONIX) EC tablet 40 mg  40 mg Oral QAM Davin Faye MD       • Pharmacy Consult   Does not apply Continuous PRN Davin Faye MD       • pioglitazone (ACTOS) tablet 30 mg  30 mg Oral Daily Davin Faye MD       • polyethylene glycol (MIRALAX) powder 17 g  17 g Oral Daily PRN Davin Faye MD       • potassium chloride (K-DUR,KLOR-CON) CR tablet 40 mEq  40 mEq Oral PRN Davin Faye MD        Or   • potassium chloride (KLOR-CON) packet 40 mEq  40 mEq Oral PRN Davin Faye MD        Or   • potassium chloride 10 mEq in 100 mL IVPB  10 mEq Intravenous Q1H PRN Davin Faye MD       • prochlorperazine (COMPAZINE) tablet 10 mg  10 mg Oral Q4H PRN Davin Faye MD       • sodium chloride 0.9 % flush 10 mL  10 mL Intravenous PRN Aisha Wolfe,        • sodium chloride 0.9 % flush 3 mL  3 mL Intravenous Q12H Davin Faye MD   3 mL at 12/07/18 0811   • sodium chloride 0.9 % flush 3-10 mL  3-10 mL Intravenous PRN Davin Faye MD       • tiotropium bromide-olodaterol (STIOLTO RESPIMAT) 2.5-2.5 MCG/ACT inhaler 2 puff  2 puff Inhalation Daily Davin Faye MD         Facility-Administered Medications Ordered in Other Encounters   Medication Dose Route Frequency Provider Last Rate Last Dose   • sodium chloride 0.9 % infusion  - ADS Override Pull            • sodium chloride 0.9 % infusion  - ADS Override Pull                Review of Systems  A comprehensive 14 point review of systems was performed.  Significant findings as  "mentioned above.  All other systems reviewed and are negative.     Physical Exam   Vital Signs: /87 (BP Location: Left arm, Patient Position: Sitting)   Pulse 92   Temp 98.3 °F (36.8 °C) (Oral)   Resp 18   Ht 182.9 cm (72\")   Wt 88 kg (194 lb)   SpO2 97%   BMI 26.31 kg/m²   General: Drowsy, in no acute distress. Ill appearing and fatigued. Currently oriented.   HEENT:  Pupils are equal, round and reactive to light and accommodation, Extra-ocular movements full, Oropharyx clear, mmm  Neck:  No JVD, thyromegaly or lymphadenopathy  CV:  Regular rate/rhythm, no murmurs, rubs or gallops  Resp:  Lungs are clear to auscultation bilaterally  Abd:  Soft, sl tender to palpation bi over the epigastric and RUQ areas, non-distended, bowel sounds normal, no organomegaly or masses.  Surgical incisions well-healed.  Ext:  No clubbing, cyanosis or edema  Lymph:  No cervical, supraclavicular, axillary,adenopathy  Neuro: grossly non-focal exam    IMAGING:  Ct Abdomen Pelvis Without Contrast    Result Date: 11/17/2018  EXAM:  CT Abdomen and Pelvis Without Intravenous Contrast EXAM DATE/TIME:  11/17/2018 1:43 AM CLINICAL HISTORY:  68 years old, male; Pain; Abdominal pain; Generalized; Prior surgery; Patient HX: Alan has had sepsis several times in the past 2/2 a malignant neoplasm of the head of his pancreas blocking off his bile duct. He has a HX of copd, hld, HTN, dm, a-fib, and cad; Additional info: Abdominal pain, fever, SOA TECHNIQUE:  Axial computed tomography images of the abdomen and pelvis without intravenous contrast.  All CT scans at this facility use at least one of these dose optimization techniques: automated exposure control; mA and/or kV adjustment per patient size (includes targeted exams where dose is matched to clinical indication); or iterative reconstruction.  Coronal reformatted images were created and reviewed. COMPARISON:  CT ABDOMEN PELVIS STONE PROTOCOL 8/13/2018 7:19 AM FINDINGS:  Lower thorax: "  No suspicious mass or airspace process in the visualized lung bases. ABDOMEN:  Liver:  Noncontrast liver shows no obvious lesion. Pneumobilia is consistent with prior sphincterotomy.  Gallbladder and bile ducts:  Gallbladder is surgically absent. Biliary stent extends from the distal common bile duct through the pancreatic head and ampulla level.  Pancreas:  Pancreatic body and tail are unremarkable for noncontrast CT fullness in the pancreatic head with peripancreatic stranding suggests a mass. The stent is new.  Spleen:  Noncontrast spleen shows no obvious focal deformity.  Adrenals:  Adrenal glands are normal in appearance.  Kidneys and ureters:  Kidneys show no stone or hydronephrosis.  Stomach and bowel:  No evidence of small bowel obstruction. No evidence of acute diverticulitis. Moderate pattern of colonic stool is present.  Appendix:  Normal caliber appendix is identified, with no adjacent inflammation. PELVIS:  Bladder:  Bladder appears normal.  Reproductive: Unremarkable as visualized. ABDOMEN and PELVIS:  Intraperitoneal space:  No pneumoperitoneum.  Bones/joints:  Degenerative changes are seen in the lumbar spine with disc height loss.  Soft tissues: Unremarkable.  Vasculature:  Atherosclerotic change present in the aorta, without aneurysm.  Lymph nodes: Normal. No enlarged lymph nodes.  Other findings:  Limited evaluation without enteric or IV contrast.     1. Interval pancreatic stent extending from the common bile duct into the ampulla without significant bile duct dilatation. Underlying apparent pancreatic head mass. 2. Small hiatal hernia which can be associated with abdominal pain in the setting of GE reflux THIS DOCUMENT HAS BEEN ELECTRONICALLY SIGNED BY DANETTE TODD MD    Xr Abdomen Flat & Upright    Result Date: 12/7/2018  XR ABDOMEN FLAT AND UPRIGHT-  CLINICAL INDICATION: constipation     COMPARISON: None available.  FINDINGS: Chest: The included view of the chest demonstrates the lungs to  be well aerated. There is no focal alveolar infiltrate or pleural effusion. The cardiac silhouette is normal. No acute osseous abnormality is seen within the chest.  Abdomen: Two views of the abdomen show no free air. I do not see abnormal bowel distention to suggest complete obstruction. The bony structures are intact. No abnormal soft tissue masses are seen. No suspicious appearing calcifications are identified on today's exam.      1. Moderate stool in the colon. 2. The lungs are clear. 3. No evidence of bowel obstruction. 4. No free air.        This report was finalized on 12/7/2018 10:00 AM by Dr. Ernesto Bell MD.      Ct Head Without Contrast    Result Date: 12/7/2018  CT HEAD WITHOUT CONTRAST-  CLINICAL INDICATION: confusion     COMPARISON: 06/20/2017  TECHNIQUE: Axial images of the brain were obtained without intravenous contrast.  Reformatted images were created in the sagittal and coronal planes.  DOSE: 746.31 mGy.cm  Radiation dose reduction techniques were utilized per ALARA protocol. Automated exposure control was initiated through either or [employer] or DoseRight software packages by  protocol.     FINDINGS: Today's study shows no mass, hemorrhage, or midline shift. The ventricles, cisterns, and sulci are unremarkable. There is no hydrocephalus. There is no evidence of acute ischemia. I do not see epidural or subdural hematoma. The gray-white differentiation is appropriate. The bone window setting images show no destructive calvarial lesion or acute calvarial fracture. The posterior fossa is unremarkable.          No acute intracranial pathology. Nothing is seen on this exam to specifically account for the patient's symptoms.  This report was finalized on 12/7/2018 9:48 AM by Dr. Ernesto Bell MD.      Ct Head Without Contrast    Result Date: 11/17/2018  EXAM:  CT Head Without Intravenous Contrast EXAM DATE/TIME:  11/17/2018 4:14 AM CLINICAL HISTORY:  68 years old, male; Altered mental  status, unspecified; Other specified personal risk factors, not elsewhere classified; Signs and symptoms; Altered mental status/memory loss; Additional info: AMS TECHNIQUE:  Axial computed tomography images of the head/brain without intravenous contrast.  All CT scans at this facility use at least one of these dose optimization techniques: automated exposure control; mA and/or kV adjustment per patient size (includes targeted exams where dose is matched to clinical indication); or iterative reconstruction. COMPARISON:  No relevant prior studies available. FINDINGS:  Brain:  No hemorrhage. No significant white matter disease. No edema.  Ventricles: Normal. No ventriculomegaly.  Bones/joints: Normal. No acute fracture.  Sinuses: Normal as visualized. No acute sinusitis.  Mastoid air cells: Normal as visualized. No mastoid effusion.  Soft tissues: Normal.     No acute infarct, hemorrhage or mass THIS DOCUMENT HAS BEEN ELECTRONICALLY SIGNED BY JOYA REBOLLEDO MD    Ct Chest Without Contrast    Result Date: 11/17/2018  EXAM:  CT Chest Without Contrast EXAM DATE/TIME:  11/17/2018 1:44 AM CLINICAL HISTORY:  68 years old, male; Pain; Chest pain; Additional info: Abdominal pain, fever, SOA TECHNIQUE:  Axial computed tomography images of the chest without intravenous contrast. All CT scans at this facility use at least one of these dose optimization techniques: automated exposure control; mA and/or kV adjustment per patient size (includes targeted exams where dose is matched to clinical indication); or iterative reconstruction. Coronal reformatted images were created and reviewed. COMPARISON:  CR XR CHEST 1 VW 11/17/2018 12:07 AM FINDINGS: Limited evaluation without IV contrast. Thoracic aorta is tortuous without focal aneurysm or dissection. No pleural effusion or pneumothorax. No enlarged lymph nodes. Pulmonary vascular and interstitial pattern does not suggest active failure. No suspicious lung mass or air space process. No  central endobronchial lesion. Bony structures show no acute fracture or destructive process.     Unremarkable noncontrast CT of the chest. THIS DOCUMENT HAS BEEN ELECTRONICALLY SIGNED BY DANETTE TODD MD    Xr Chest 1 View    Result Date: 12/7/2018  XR CHEST 1 VIEW-  CLINICAL INDICATION: fever     COMPARISON: 10/30/2018  TECHNIQUE: Single frontal view of the chest.  FINDINGS:  Left basilar atelectasis. The cardiac silhouette is normal. The pulmonary vasculature is unremarkable. There is no evidence of an acute osseous abnormality. There are no suspicious-appearing parenchymal soft tissue nodules.         Left basilar atelectasis.         Xr Chest 1 View    Result Date: 11/17/2018  EXAM:   XR Chest, 1 View CLINICAL HISTORY:   68 years old, male; Signs and symptoms; Fever; Prior surgery; Surgery date: 1-6 months; Surgery type: Port for chemo; Additional info: Chest pain triage protocol TECHNIQUE:   Frontal view of the chest. COMPARISON:   CR XR CHEST 1 VW 10/17/2018 4:12 PM FINDINGS:   Lungs: No acute lobar consolidation.   Pleural space:  Unremarkable.  No pneumothorax.   Heart:  Unremarkable.  No cardiomegaly.   Mediastinum:  Ovoid collection of air projecting over the left lung base, new from prior exam, suspect air in transverse colon or gastric hiatal hernia.   Bones/joints:  Unremarkable.   Vasculature:  Tortuous aorta.   Tubes, lines and devices:  New right-sided chest port.     1.  Ovoid collection of air projecting over the left lung base, new from prior exam, suspect air in transverse colon or gastric hiatal hernia. 2.  New right-sided chest port. THIS DOCUMENT HAS BEEN ELECTRONICALLY SIGNED BY WINDY JOHNSON MD      RECENT LABS:  Lab Results   Component Value Date    WBC 10.68 12/07/2018    HGB 11.3 (L) 12/07/2018    HCT 34.6 (L) 12/07/2018    MCV 91.3 12/07/2018    RDW 16.1 (H) 12/07/2018     12/07/2018    NEUTRORELPCT 61.2 12/06/2018    LYMPHORELPCT 13.0 (L) 12/06/2018    MONORELPCT 18.8 (H)  12/06/2018    EOSRELPCT 2.0 12/06/2018    BASORELPCT 0.6 12/06/2018    NEUTROABS 8.65 (H) 12/07/2018    LYMPHSABS 1.39 12/06/2018       Lab Results   Component Value Date     (L) 12/07/2018    K 4.2 12/07/2018    CO2 20.6 (L) 12/07/2018     12/07/2018    BUN 30 (H) 12/07/2018    CREATININE 1.24 12/07/2018    EGFRIFNONA 58 (L) 12/07/2018    EGFRIFAFRI  09/02/2016      Comment:      <15 Indicative of kidney failure.    GLUCOSE 251 (H) 12/07/2018    CALCIUM 8.6 12/07/2018    ALKPHOS 102 12/06/2018    AST 18 12/06/2018    ALT 18 12/06/2018    BILITOT 0.4 12/06/2018    ALBUMIN 3.80 12/06/2018    PROTEINTOT 6.6 12/06/2018    MG 1.7 12/07/2018     Lab Results   Component Value Date    FERRITIN 367.00 (H) 09/07/2018    IRON 93 09/07/2018    TIBC 297 09/07/2018    LABIRON 31 09/07/2018    MEFCHFHA54 677 09/07/2018    FOLATE 22.81 (H) 09/07/2018     Lactate 0.5 - 2.0 mmol/L 2.5 Critically high       C-Reactive Protein 0.00 - 0.99 mg/dL 3.15 Abnormally high         ASSESSMENT & PLAN:  Todd Phillips is a very pleasant 68 y.o. male with    1.  Pancreatic Cancer:   -In regards to his oncology history, please see most recent follow up note dated 12/4/18 for full details.   -Recently started on neoadjuvant treatment with Gemzar/Abraxane and completed his first cycle in mid-November. -Unfortunately, he has been struggling with ongoing nausea/vomiting and dehydration. As a result, he has very poor nutrition and his symptoms have made it very difficult to treat him. We started him on TPN per Dr. Hale's recommendations a couple of weeks ago which had been very helpful. We saw him in clinic earlier this week as above and overall, he was feeling much better but did give him IVF for dehydration. Chemotherapy has been on hold and plan was to follow up with Dr. Hale early next week with repeat imaging and possibly schedule surgery at that time. Unfortunately, a couple of days ago, began having worsening fatigue,  weakness, cough, fever with temp 101.7. He is now in observation for further evaluation/managment. Will continue with supportive care for now and continue to closely monitor.     2. Fever:  -Noted axillary temp of 101.7 at home. Currently in observation. Agree with broad spectrum antibiotics. Of note, patient has history of bacteremia and cholangitis since diagnosis. He is at high risk for infection, currently with positive lactic acid, elevated CRP and altered mental status.  CT head unremarkable. U/A negative. BC pending. XR abdomen showed moderate stool in colon with no evidence of bowel obstruction. Will obtain CXR.     3.Nausea/Dehydration/Poor nutrition:  -Continue Sancuso patch with Compazine and Ativan 0.5 mg (1-2 tabs) scheduled every 4 hours. He is also taking Marinol 5 mg BID.  -Will continueTPN per Dr. Hale's recommendations.   -He was given IVF in ER.     4. Neoplasm related pain:  -Continue MSContin 30 mg BID and oxycodone 5 mg 1-3 tabs every 4 hours prn.     Thank you so much for allowing us to participate in the care of Mr. Phillips. Please do not hesitate to contact Hem/Onc with any questions or concerns.       Electronically Signed by: VIMAL Nielson , December 7, 2018 9:47 AM

## 2018-12-07 NOTE — CONSULTS
Pt asleep in bed, wife at bedside.  Wife takes care of all of pts medical needs and states her daughter is a RN who helps as well.  Couns pts wife on proper insulin usage.  Performed demonstration of drawing up Insulin with  Bottle and syringe and taught pen usage and importance of priming a pen before usage.  Pt wife gives proper return demonstration with both.  Couns pt wife on importance of monitoring for hypoglycemia and how to treat.  Handouts given on hypoglycemia and hyperglycemia.  Will continue to monitor pt as needed.

## 2018-12-07 NOTE — PLAN OF CARE
Problem: Skin Injury Risk (Adult)  Goal: Identify Related Risk Factors and Signs and Symptoms  Outcome: Ongoing (interventions implemented as appropriate)      Problem: Patient Care Overview  Goal: Plan of Care Review  Outcome: Ongoing (interventions implemented as appropriate)    Goal: Individualization and Mutuality  Outcome: Ongoing (interventions implemented as appropriate)    Goal: Discharge Needs Assessment  Outcome: Ongoing (interventions implemented as appropriate)    Goal: Interprofessional Rounds/Family Conf  Outcome: Ongoing (interventions implemented as appropriate)      Problem: Nutrition, Parenteral (Adult)  Goal: Signs and Symptoms of Listed Potential Problems Will be Absent, Minimized or Managed (Nutrition, Parenteral)  Outcome: Ongoing (interventions implemented as appropriate)

## 2018-12-07 NOTE — PLAN OF CARE
Problem: Skin Injury Risk (Adult)  Goal: Identify Related Risk Factors and Signs and Symptoms  Outcome: Ongoing (interventions implemented as appropriate)   12/07/18 0953   Skin Injury Risk (Adult)   Related Risk Factors (Skin Injury Risk) advanced age       Problem: Patient Care Overview  Goal: Plan of Care Review  Outcome: Ongoing (interventions implemented as appropriate)   12/07/18 0726 12/07/18 0730   Coping/Psychosocial   Plan of Care Reviewed With --  spouse   Coping/Psychosocial   Patient Agreement with Plan of Care agrees --    Plan of Care Review   Progress no change --        Problem: Hyperglycemia, Persistent (Adult)  Goal: Signs and Symptoms of Listed Potential Problems Will be Absent, Minimized or Managed (Hyperglycemia, Persistent)  Outcome: Ongoing (interventions implemented as appropriate)   12/07/18 0726 12/07/18 0953   Goal/Outcome Evaluation   Problems Assessed (Hyperglycemia) hyperglycemia --    Problems Present (Hyperglycemia) --  hyperglycemia       Problem: Nutrition, Parenteral (Adult)  Goal: Signs and Symptoms of Listed Potential Problems Will be Absent, Minimized or Managed (Nutrition, Parenteral)  Outcome: Ongoing (interventions implemented as appropriate)   12/07/18 0953   Goal/Outcome Evaluation   Problems Assessed (Parenteral Nutrition) all       Problem: Cardiac Output Decreased (Adult)  Goal: Identify Related Risk Factors and Signs and Symptoms  Outcome: Ongoing (interventions implemented as appropriate)

## 2018-12-08 PROBLEM — R41.82 ALTERED MENTAL STATUS: Status: RESOLVED | Noted: 2018-11-17 | Resolved: 2018-12-08

## 2018-12-08 PROBLEM — R74.8 ELEVATED LIVER ENZYMES: Status: RESOLVED | Noted: 2018-11-17 | Resolved: 2018-12-08

## 2018-12-08 PROBLEM — Z01.818 PREOPERATIVE CLEARANCE: Status: RESOLVED | Noted: 2017-11-15 | Resolved: 2018-12-08

## 2018-12-08 PROBLEM — K81.9 CHOLECYSTITIS: Status: RESOLVED | Noted: 2018-08-13 | Resolved: 2018-12-08

## 2018-12-08 PROBLEM — K82.8 BILIARY DYSKINESIA: Status: RESOLVED | Noted: 2018-07-26 | Resolved: 2018-12-08

## 2018-12-08 PROBLEM — E66.9 OBESITY (BMI 30.0-34.9): Status: RESOLVED | Noted: 2018-07-30 | Resolved: 2018-12-08

## 2018-12-08 PROBLEM — E80.6 HYPERBILIRUBINEMIA: Status: RESOLVED | Noted: 2018-08-13 | Resolved: 2018-12-08

## 2018-12-08 LAB
ANION GAP SERPL CALCULATED.3IONS-SCNC: 5.2 MMOL/L (ref 3.6–11.2)
BASOPHILS # BLD AUTO: 0.03 10*3/MM3 (ref 0–0.3)
BASOPHILS NFR BLD AUTO: 0.2 % (ref 0–2)
BUN BLD-MCNC: 30 MG/DL (ref 7–21)
BUN/CREAT SERPL: 25 (ref 7–25)
CALCIUM SPEC-SCNC: 9.1 MG/DL (ref 7.7–10)
CHLORIDE SERPL-SCNC: 100 MMOL/L (ref 99–112)
CO2 SERPL-SCNC: 24.8 MMOL/L (ref 24.3–31.9)
CREAT BLD-MCNC: 1.2 MG/DL (ref 0.43–1.29)
D-LACTATE SERPL-SCNC: 2 MMOL/L (ref 0.5–2)
DEPRECATED RDW RBC AUTO: 50.4 FL (ref 37–54)
EOSINOPHIL # BLD AUTO: 0 10*3/MM3 (ref 0–0.7)
EOSINOPHIL NFR BLD AUTO: 0 % (ref 0–7)
ERYTHROCYTE [DISTWIDTH] IN BLOOD BY AUTOMATED COUNT: 15.9 % (ref 11.5–14.5)
FLUAV AG NPH QL: NEGATIVE
FLUBV AG NPH QL IA: NEGATIVE
GFR SERPL CREATININE-BSD FRML MDRD: 60 ML/MIN/1.73
GLUCOSE BLD-MCNC: 251 MG/DL (ref 70–110)
GLUCOSE BLDC GLUCOMTR-MCNC: 164 MG/DL (ref 70–130)
GLUCOSE BLDC GLUCOMTR-MCNC: 176 MG/DL (ref 70–130)
GLUCOSE BLDC GLUCOMTR-MCNC: 177 MG/DL (ref 70–130)
GLUCOSE BLDC GLUCOMTR-MCNC: 177 MG/DL (ref 70–130)
GLUCOSE BLDC GLUCOMTR-MCNC: 196 MG/DL (ref 70–130)
GLUCOSE BLDC GLUCOMTR-MCNC: 259 MG/DL (ref 70–130)
HBA1C MFR BLD: 7 % (ref 4.5–5.7)
HCT VFR BLD AUTO: 34.3 % (ref 42–52)
HGB BLD-MCNC: 11.1 G/DL (ref 14–18)
IMM GRANULOCYTES # BLD: 0.31 10*3/MM3 (ref 0–0.03)
IMM GRANULOCYTES NFR BLD: 2.1 % (ref 0–0.5)
LYMPHOCYTES # BLD AUTO: 1.17 10*3/MM3 (ref 1–3)
LYMPHOCYTES NFR BLD AUTO: 8 % (ref 16–46)
MAGNESIUM SERPL-MCNC: 2.6 MG/DL (ref 1.7–2.6)
MCH RBC QN AUTO: 29.6 PG (ref 27–33)
MCHC RBC AUTO-ENTMCNC: 32.4 G/DL (ref 33–37)
MCV RBC AUTO: 91.5 FL (ref 80–94)
MONOCYTES # BLD AUTO: 1.42 10*3/MM3 (ref 0.1–0.9)
MONOCYTES NFR BLD AUTO: 9.8 % (ref 0–12)
NEUTROPHILS # BLD AUTO: 11.63 10*3/MM3 (ref 1.4–6.5)
NEUTROPHILS NFR BLD AUTO: 79.9 % (ref 40–75)
OSMOLALITY SERPL CALC.SUM OF ELEC: 275.5 MOSM/KG (ref 273–305)
PHOSPHATE SERPL-MCNC: 3.4 MG/DL (ref 2.7–4.5)
PLATELET # BLD AUTO: 273 10*3/MM3 (ref 130–400)
PMV BLD AUTO: 10.1 FL (ref 6–10)
POTASSIUM BLD-SCNC: 4.4 MMOL/L (ref 3.5–5.3)
RBC # BLD AUTO: 3.75 10*6/MM3 (ref 4.7–6.1)
SODIUM BLD-SCNC: 130 MMOL/L (ref 135–153)
WBC NRBC COR # BLD: 14.56 10*3/MM3 (ref 4.5–12.5)

## 2018-12-08 PROCEDURE — 25010000002 ONDANSETRON PER 1 MG: Performed by: INTERNAL MEDICINE

## 2018-12-08 PROCEDURE — 63710000001 INSULIN ASPART PER 5 UNITS

## 2018-12-08 PROCEDURE — 63710000001 DRONABINOL PER 2.5 MG: Performed by: INTERNAL MEDICINE

## 2018-12-08 PROCEDURE — 83036 HEMOGLOBIN GLYCOSYLATED A1C: CPT | Performed by: PHYSICIAN ASSISTANT

## 2018-12-08 PROCEDURE — 94799 UNLISTED PULMONARY SVC/PX: CPT

## 2018-12-08 PROCEDURE — 82962 GLUCOSE BLOOD TEST: CPT

## 2018-12-08 PROCEDURE — 93010 ELECTROCARDIOGRAM REPORT: CPT | Performed by: INTERNAL MEDICINE

## 2018-12-08 PROCEDURE — 94640 AIRWAY INHALATION TREATMENT: CPT

## 2018-12-08 PROCEDURE — 25010000003 MICAFUNGIN SODIUM 100 MG RECONSTITUTED SOLUTION 1 EACH VIAL: Performed by: PHYSICIAN ASSISTANT

## 2018-12-08 PROCEDURE — 80048 BASIC METABOLIC PNL TOTAL CA: CPT | Performed by: INTERNAL MEDICINE

## 2018-12-08 PROCEDURE — 99233 SBSQ HOSP IP/OBS HIGH 50: CPT | Performed by: PHYSICIAN ASSISTANT

## 2018-12-08 PROCEDURE — 93005 ELECTROCARDIOGRAM TRACING: CPT | Performed by: PHYSICIAN ASSISTANT

## 2018-12-08 PROCEDURE — 85025 COMPLETE CBC W/AUTO DIFF WBC: CPT | Performed by: INTERNAL MEDICINE

## 2018-12-08 PROCEDURE — 25010000002 PROMETHAZINE PER 50 MG: Performed by: NURSE PRACTITIONER

## 2018-12-08 PROCEDURE — 83605 ASSAY OF LACTIC ACID: CPT | Performed by: NURSE PRACTITIONER

## 2018-12-08 PROCEDURE — 25010000002 CEFEPIME 2 G/NS 100 ML SOLUTION: Performed by: INTERNAL MEDICINE

## 2018-12-08 PROCEDURE — 87804 INFLUENZA ASSAY W/OPTIC: CPT | Performed by: PHYSICIAN ASSISTANT

## 2018-12-08 PROCEDURE — 83735 ASSAY OF MAGNESIUM: CPT | Performed by: INTERNAL MEDICINE

## 2018-12-08 PROCEDURE — 84100 ASSAY OF PHOSPHORUS: CPT

## 2018-12-08 RX ORDER — MORPHINE SULFATE 2 MG/ML
1 INJECTION, SOLUTION INTRAMUSCULAR; INTRAVENOUS EVERY 4 HOURS PRN
Status: DISCONTINUED | OUTPATIENT
Start: 2018-12-08 | End: 2018-12-13 | Stop reason: HOSPADM

## 2018-12-08 RX ORDER — LANSOPRAZOLE
30 KIT
Status: DISCONTINUED | OUTPATIENT
Start: 2018-12-08 | End: 2018-12-11 | Stop reason: SDUPTHER

## 2018-12-08 RX ORDER — PROMETHAZINE HYDROCHLORIDE 12.5 MG/1
12.5 TABLET ORAL EVERY 6 HOURS PRN
Status: DISCONTINUED | OUTPATIENT
Start: 2018-12-08 | End: 2018-12-11

## 2018-12-08 RX ORDER — PROMETHAZINE HYDROCHLORIDE 12.5 MG/1
12.5 TABLET ORAL EVERY 6 HOURS PRN
Status: DISCONTINUED | OUTPATIENT
Start: 2018-12-08 | End: 2018-12-08

## 2018-12-08 RX ORDER — PROMETHAZINE HYDROCHLORIDE 12.5 MG/1
12.5 SUPPOSITORY RECTAL EVERY 6 HOURS PRN
Status: DISCONTINUED | OUTPATIENT
Start: 2018-12-08 | End: 2018-12-11

## 2018-12-08 RX ORDER — SODIUM CHLORIDE 9 MG/ML
INJECTION, SOLUTION INTRAVENOUS
Status: COMPLETED
Start: 2018-12-08 | End: 2018-12-08

## 2018-12-08 RX ORDER — NITROGLYCERIN 0.4 MG/1
TABLET SUBLINGUAL
Status: DISPENSED
Start: 2018-12-08 | End: 2018-12-09

## 2018-12-08 RX ORDER — PROMETHAZINE HYDROCHLORIDE 25 MG/ML
12.5 INJECTION, SOLUTION INTRAMUSCULAR; INTRAVENOUS EVERY 6 HOURS PRN
Status: DISCONTINUED | OUTPATIENT
Start: 2018-12-08 | End: 2018-12-08

## 2018-12-08 RX ORDER — PROMETHAZINE HYDROCHLORIDE 25 MG/ML
12.5 INJECTION, SOLUTION INTRAMUSCULAR; INTRAVENOUS EVERY 6 HOURS PRN
Status: DISCONTINUED | OUTPATIENT
Start: 2018-12-08 | End: 2018-12-11

## 2018-12-08 RX ORDER — PROMETHAZINE HYDROCHLORIDE 12.5 MG/1
12.5 SUPPOSITORY RECTAL EVERY 6 HOURS PRN
Status: DISCONTINUED | OUTPATIENT
Start: 2018-12-08 | End: 2018-12-08

## 2018-12-08 RX ADMIN — CEFEPIME 2 G: 2 INJECTION, POWDER, FOR SOLUTION INTRAVENOUS at 00:30

## 2018-12-08 RX ADMIN — SODIUM CHLORIDE 50 ML: 9 INJECTION, SOLUTION INTRAVENOUS at 18:11

## 2018-12-08 RX ADMIN — DRONABINOL 5 MG: 2.5 CAPSULE ORAL at 19:35

## 2018-12-08 RX ADMIN — CEFEPIME 2 G: 2 INJECTION, POWDER, FOR SOLUTION INTRAVENOUS at 23:26

## 2018-12-08 RX ADMIN — MONTELUKAST SODIUM 10 MG: 10 TABLET, COATED ORAL at 08:21

## 2018-12-08 RX ADMIN — FLECAINIDE ACETATE 50 MG: 50 TABLET ORAL at 08:21

## 2018-12-08 RX ADMIN — SODIUM CHLORIDE 50 ML: 9 INJECTION, SOLUTION INTRAVENOUS at 01:29

## 2018-12-08 RX ADMIN — ONDANSETRON 4 MG: 4 TABLET, FILM COATED ORAL at 22:08

## 2018-12-08 RX ADMIN — APIXABAN 5 MG: 5 TABLET, FILM COATED ORAL at 08:21

## 2018-12-08 RX ADMIN — DRONABINOL 5 MG: 2.5 CAPSULE ORAL at 08:20

## 2018-12-08 RX ADMIN — PROMETHAZINE HYDROCHLORIDE 12.5 MG: 25 INJECTION INTRAMUSCULAR; INTRAVENOUS at 18:12

## 2018-12-08 RX ADMIN — PIOGLITAZONE 30 MG: 15 TABLET ORAL at 08:21

## 2018-12-08 RX ADMIN — PANTOPRAZOLE SODIUM 40 MG: 40 TABLET, DELAYED RELEASE ORAL at 06:12

## 2018-12-08 RX ADMIN — INSULIN ASPART 2 UNITS: 100 INJECTION, SOLUTION INTRAVENOUS; SUBCUTANEOUS at 06:12

## 2018-12-08 RX ADMIN — MORPHINE SULFATE 30 MG: 30 TABLET, EXTENDED RELEASE ORAL at 20:41

## 2018-12-08 RX ADMIN — LEUCINE, PHENYLALANINE, LYSINE, METHIONINE, ISOLEUCINE, VALINE, HISTIDINE, THREONINE, TRYPTOPHAN, ALANINE, GLYCINE, ARGININE, PROLINE, SERINE, TYROSINE, SODIUM ACETATE, DIBASIC POTASSIUM PHOSPHATE, MAGNESIUM CHLORIDE, SODIUM CHLORIDE, CALCIUM CHLORIDE, DEXTROSE
365; 280; 290; 200; 300; 290; 240; 210; 90; 1035; 515; 575; 340; 250; 20; 340; 261; 51; 59; 33; 20 INJECTION INTRAVENOUS at 17:43

## 2018-12-08 RX ADMIN — ALBUTEROL SULFATE 2.5 MG: 2.5 SOLUTION RESPIRATORY (INHALATION) at 07:14

## 2018-12-08 RX ADMIN — FLUOXETINE 20 MG: 20 CAPSULE ORAL at 08:21

## 2018-12-08 RX ADMIN — ONDANSETRON 4 MG: 2 INJECTION, SOLUTION INTRAMUSCULAR; INTRAVENOUS at 15:14

## 2018-12-08 RX ADMIN — PROMETHAZINE HYDROCHLORIDE 12.5 MG: 25 INJECTION INTRAMUSCULAR; INTRAVENOUS at 01:28

## 2018-12-08 RX ADMIN — INSULIN ASPART 2 UNITS: 100 INJECTION, SOLUTION INTRAVENOUS; SUBCUTANEOUS at 11:18

## 2018-12-08 RX ADMIN — LANSOPRAZOLE 30 MG: KIT at 20:35

## 2018-12-08 RX ADMIN — INSULIN ASPART 2 UNITS: 100 INJECTION, SOLUTION INTRAVENOUS; SUBCUTANEOUS at 23:27

## 2018-12-08 RX ADMIN — SODIUM CHLORIDE, PRESERVATIVE FREE 3 ML: 5 INJECTION INTRAVENOUS at 08:23

## 2018-12-08 RX ADMIN — INSULIN ASPART 2 UNITS: 100 INJECTION, SOLUTION INTRAVENOUS; SUBCUTANEOUS at 17:44

## 2018-12-08 RX ADMIN — ONDANSETRON 4 MG: 2 INJECTION, SOLUTION INTRAMUSCULAR; INTRAVENOUS at 08:21

## 2018-12-08 RX ADMIN — CHOLECALCIFEROL TAB 10 MCG (400 UNIT) 1000 UNITS: 10 TAB at 08:28

## 2018-12-08 RX ADMIN — COLCHICINE 0.6 MG: 0.6 TABLET, FILM COATED ORAL at 08:21

## 2018-12-08 RX ADMIN — INSULIN ASPART 4 UNITS: 100 INJECTION, SOLUTION INTRAVENOUS; SUBCUTANEOUS at 00:37

## 2018-12-08 RX ADMIN — ALBUTEROL SULFATE 2.5 MG: 2.5 SOLUTION RESPIRATORY (INHALATION) at 01:10

## 2018-12-08 RX ADMIN — MICAFUNGIN SODIUM 100 MG: 20 INJECTION, POWDER, LYOPHILIZED, FOR SOLUTION INTRAVENOUS at 20:34

## 2018-12-08 RX ADMIN — ALLOPURINOL 100 MG: 100 TABLET ORAL at 08:21

## 2018-12-08 RX ADMIN — OXYCODONE HYDROCHLORIDE 5 MG: 5 TABLET ORAL at 04:49

## 2018-12-08 RX ADMIN — LANSOPRAZOLE 30 MG: KIT at 11:03

## 2018-12-08 RX ADMIN — FLUOXETINE 20 MG: 20 CAPSULE ORAL at 20:41

## 2018-12-08 RX ADMIN — CEFEPIME 2 G: 2 INJECTION, POWDER, FOR SOLUTION INTRAVENOUS at 08:24

## 2018-12-08 RX ADMIN — CEFEPIME 2 G: 2 INJECTION, POWDER, FOR SOLUTION INTRAVENOUS at 14:49

## 2018-12-08 RX ADMIN — MORPHINE SULFATE 30 MG: 30 TABLET, EXTENDED RELEASE ORAL at 08:20

## 2018-12-08 RX ADMIN — FLECAINIDE ACETATE 50 MG: 50 TABLET ORAL at 20:41

## 2018-12-08 RX ADMIN — APIXABAN 5 MG: 5 TABLET, FILM COATED ORAL at 20:41

## 2018-12-08 RX ADMIN — SODIUM CHLORIDE, PRESERVATIVE FREE 3 ML: 5 INJECTION INTRAVENOUS at 20:42

## 2018-12-08 RX ADMIN — ALBUTEROL SULFATE 2.5 MG: 2.5 SOLUTION RESPIRATORY (INHALATION) at 19:03

## 2018-12-08 NOTE — PLAN OF CARE
Problem: Skin Injury Risk (Adult)  Goal: Identify Related Risk Factors and Signs and Symptoms  Outcome: Ongoing (interventions implemented as appropriate)    Goal: Skin Health and Integrity  Outcome: Ongoing (interventions implemented as appropriate)      Problem: Patient Care Overview  Goal: Discharge Needs Assessment  Outcome: Ongoing (interventions implemented as appropriate)    Goal: Interprofessional Rounds/Family Conf  Outcome: Ongoing (interventions implemented as appropriate)      Problem: Cardiac Output Decreased (Adult)  Goal: Effective Tissue Perfusion  Outcome: Ongoing (interventions implemented as appropriate)      Problem: Patient Care Overview  Goal: Interprofessional Rounds/Family Conf  Outcome: Ongoing (interventions implemented as appropriate)

## 2018-12-08 NOTE — PROGRESS NOTES
[facility] HOSPITALIST PROGRESS NOTE     Patient Identification:  Name:  Todd Phillips  Age:  68 y.o.  Sex:  male  :  1950  MRN:  [id number]  Visit Number:  [id number]  Primary Care Provider:  Adiel Denney MD    Length of stay:  0     I have reviewed labs/imaging/records from this hospitalization, including ER staff, consultants, and observation unit staff's H&P and progress notes to establish a comprehensive understanding of this patient's clinical hospital course, as well as to establish a transition of care appropriately.    ----------------------------------------------------------------------------------------------------------------------  Subjective     Chief Complaint:  Transfer of care from Obs unit. Patient with severe sepsis and pancreatic cancer     History of Presenting Illness:  Patient is a 68-year-old male with past medical history significant for pancreatic cancer, paroxysmal atrial fibrillation, hypertension, stage III CK D, COPD non-oxygen dependent, non-insulin-dependent diabetes, gout, GERD, malnutrition on TPN, and anxiety/depression that was initially admitted to the observation unit with fever and severe sepsis.  Due to need for empiric IV antibiotics and prolonged antibiotics, patient was admitted patient today.     Subjective:  Today, the patient was sitting up on side of bed during my evaluation. Patient states that he feels sick, reports nausea and generalized ill sensation. Reports emesis yesterday, no emesis today. Patient wife states that prior to admission he did have mild cough without sputum production, he initially came in for fever. No further documented fevers, denies any subjective fevers, chills, or diaphoresis. Denies any chest pain or shortness of breath, no palpitations. Denies any urinary symptoms. He does report some abdominal discomfort for which he states is due to his cancer.     Present during exam: Patient's wife  and daughter   ----------------------------------------------------------------------------------------------------------------------  Objective     Consults:  Hematology/Oncology   Infectious disease      Procedures:  None     Current [facility] Meds:    allopurinol 100 mg Oral Daily   apixaban 5 mg Oral Q12H   cefepime 2 g Intravenous Q8H   cholecalciferol 1,000 Units Oral Daily   colchicine 0.6 mg Oral Daily   dronabinol 5 mg Oral BID AC   fat emulsion 250 mL Intravenous Once per day on Mon Wed Fri   And      [START ON 12/10/2018] trace minerals + multivitamin IVPB  Intravenous Once per day on Mon Wed Fri   flecainide 50 mg Oral Q12H   FLUoxetine 20 mg Oral BID   insulin aspart 0-7 Units Subcutaneous Q6H   lansoprazole 30 mg Oral BID AC   linaclotide 145 mcg Oral QAM AC   mometasone 2 puff Inhalation Nightly   montelukast 10 mg Oral Daily   Morphine 30 mg Oral Q12H   pantoprazole 40 mg Oral QAM   pioglitazone 30 mg Oral Daily   sodium chloride 3 mL Intravenous Q12H   tiotropium bromide-olodaterol 2 puff Inhalation Daily       Adult [employer] TPN  Last Rate: 80 mL/hr at 12/08/18 0925   Pharmacy Consult       ----------------------------------------------------------------------------------------------------------------------  Vital Signs:  Temp:  [96.2 °F (35.7 °C)-98 °F (36.7 °C)] 97.6 °F (36.4 °C)  Heart Rate:  [70-83] 80  Resp:  [16-18] 18  BP: (101-147)/(64-93) 101/64  Mean Arterial Pressure (Non-Invasive) for the past 24 hrs (Last 3 readings):   Noninvasive MAP (mmHg)   12/08/18 1000 79   12/08/18 0600 89   12/08/18 0400 91     SpO2 Percentage    12/08/18 0600 12/08/18 0714 12/08/18 1000   SpO2: 98% 98% 98%     SpO2:  [93 %-98 %] 98 %  on   ;   Device (Oxygen Therapy): room air    Body mass index is 26.31 kg/m².  Wt Readings from Last 3 Encounters:   12/06/18 88 kg (194 lb)   12/04/18 88.4 kg (194 lb 12.8 oz)   11/27/18 90.7 kg (200 lb)        Intake/Output Summary (Last 24 hours) at 12/8/2018 1411  Last  data filed at 12/8/2018 0714  Gross per 24 hour   Intake 480 ml   Output 1200 ml   Net -720 ml     Adult [employer] TPN  Diet Regular; Consistent Carbohydrate  ----------------------------------------------------------------------------------------------------------------------  Physical exam:  Physical Exam   Constitutional: He is oriented to person, place, and time. He appears well-developed and well-nourished.  Non-toxic appearance. He appears ill. No distress.   HENT:   Head: Normocephalic and atraumatic.   Right Ear: External ear normal.   Left Ear: External ear normal.   Nose: Nose normal.   Mouth/Throat: Oropharynx is clear and moist and mucous membranes are normal. No oropharyngeal exudate.   Eyes: Conjunctivae and EOM are normal. Pupils are equal, round, and reactive to light. No scleral icterus.   Neck: Trachea normal and normal range of motion. Neck supple. No JVD present. No muscular tenderness present. Carotid bruit is not present. No tracheal deviation present. No thyromegaly present.   Cardiovascular: Normal rate, regular rhythm, normal heart sounds and intact distal pulses. Exam reveals no gallop and no friction rub.   No murmur heard.  Pulmonary/Chest: Effort normal and breath sounds normal. No respiratory distress. He has no wheezes. He has no rhonchi. He has no rales. He exhibits no tenderness.   PAC right chest, no surrounding erythema or edema    Abdominal: Soft. Bowel sounds are normal. He exhibits no distension and no mass. There is no hepatosplenomegaly. There is no tenderness. There is no rebound and no guarding. No hernia.   Musculoskeletal: He exhibits no edema, tenderness or deformity.   Neurological: He is alert and oriented to person, place, and time. He has normal strength. No cranial nerve deficit or sensory deficit.   Skin: Skin is warm and dry. Capillary refill takes less than 2 seconds. No rash noted. No cyanosis or erythema. There is pallor. Nails show no clubbing.    Psychiatric: He has a normal mood and affect. His speech is normal and behavior is normal. Judgment and thought content normal.   Nursing note and vitals reviewed.     ----------------------------------------------------------------------------------------------------------------------  Tele:    Sinus 70s     I have personally reviewed the EKG/Telemetry strips   ----------------------------------------------------------------------------------------------------------------------  Results from last 7 days   Lab Units  12/06/18   2305   TROPONIN I ng/mL  0.007       Results from last 7 days   Lab Units  12/07/18   0705   TRIGLYCERIDES mg/dL  127     Results from last 7 days   Lab Units  12/06/18   2319   PH, ARTERIAL pH units  7.530*   PO2 ART mm Hg  74.8*   PCO2, ARTERIAL mm Hg  29.7*   HCO3 ART mmol/L  24.3     Results from last 7 days   Lab Units  12/08/18   0816  12/08/18   0325  12/07/18   1141  12/07/18   0705  12/06/18   2305   CRP mg/dL   --    --    --   3.15*  2.00*   LACTATE mmol/L  2.0   --   2.1*  2.5*  1.3   WBC 10*3/mm3   --   14.56*   --   10.68  10.72   HEMOGLOBIN g/dL   --   11.1*   --   11.3*  11.3*   HEMATOCRIT %   --   34.3*   --   34.6*  33.9*   MCV fL   --   91.5   --   91.3  89.4   MCHC g/dL   --   32.4*   --   32.7*  33.3   PLATELETS 10*3/mm3   --   273   --   282  323   INR    --    --    --    --   1.56*     Results from last 7 days   Lab Units  12/08/18   0325  12/07/18   0705  12/06/18   2305  12/04/18   1149   SODIUM mmol/L  130*  131*  132*  136   POTASSIUM mmol/L  4.4  4.2  4.2  4.1   MAGNESIUM mg/dL  2.6  1.7   --    --    CHLORIDE mmol/L  100  102  99  100   CO2 mmol/L  24.8  20.6*  24.0*  28.8   BUN mg/dL  30*  30*  33*  36*   CREATININE mg/dL  1.20  1.24  1.27  1.37*   EGFR IF NONAFRICN AM mL/min/1.73  60*  58*  56*  51*   CALCIUM mg/dL  9.1  8.6  9.1  9.8   GLUCOSE mg/dL  251*  251*  172*  128*   ALBUMIN g/dL   --    --   3.80  4.20   BILIRUBIN mg/dL   --    --   0.4  0.3   ALK  PHOS U/L   --    --   102  118   AST (SGOT) U/L   --    --   18  21   ALT (SGPT) U/L   --    --   18  16   Estimated Creatinine Clearance: 71.3 mL/min (by C-G formula based on SCr of 1.2 mg/dL).  Ammonia   Date Value Ref Range Status   12/06/2018 20 16 - 60 umol/L Final     Glucose   Date/Time Value Ref Range Status   12/08/2018 1050 177 (H) 70 - 130 mg/dL Final   12/08/2018 0718 177 (H) 70 - 130 mg/dL Final   12/08/2018 0606 196 (H) 70 - 130 mg/dL Final   12/08/2018 0030 259 (H) 70 - 130 mg/dL Final   12/07/2018 1959 243 (H) 70 - 130 mg/dL Final   12/07/2018 1633 231 (H) 70 - 130 mg/dL Final   12/07/2018 1109 294 (H) 70 - 130 mg/dL Final   12/07/2018 0650 252 (H) 70 - 130 mg/dL Final     Lab Results   Component Value Date    HGBA1C 6.40 (H) 11/18/2018     Lab Results   Component Value Date    TSH 1.502 11/18/2018    FREET4 1.14 10/30/2018     Blood Culture   Date Value Ref Range Status   12/06/2018 No growth at 24 hours  Preliminary   12/06/2018 No growth at 24 hours  Preliminary     Urine Culture   Date Value Ref Range Status   12/06/2018 No growth at 24 hours  Preliminary      I have personally reviewed the above laboratory results.   ----------------------------------------------------------------------------------------------------------------------  Imaging Results (last 24 hours)     Procedure Component Value Units Date/Time    XR Chest PA & Lateral [[id number]] Collected:  12/07/18 1505     Updated:  12/07/18 1508    Narrative:       FINDINGS:   PowerPort stable in the superior vena cava   Heart and mediastinal contours are unremarkable.   No pneumothorax.   Bony and soft tissue structures are unremarkable.    Impression:       No radiographic evidence of acute cardiac or pulmonary disease.     This report was finalized on 12/7/2018 3:06 PM by Dr. Ernesto Bell MD.      I have personally reviewed the above radiology results.    ----------------------------------------------------------------------------------------------------------------------  Assessment/Plan     -Sepsis that was present on admission (lactic acid > 2, elevated CRP, and fever)  with unclear etiology: WBC elevated today, hemodynamically stable. Patient at high risk of infection, has history of ESBL E coli bacteremia in August 2018 and Klebsiella bacteremia in October 2018 and is immunocompromised in setting of pancreatic cancer with recent chemotherapy. Blood cultures with no growth to date. U/A unremarkable. ID on the case, I personally spoke with Annabelle CELIS. Patient to be on daily Micafungin, Cefepime, and pharmacy to dose vancomycin. Continue per ID recommendations. Follow cultures for final results. Follow temperature curve, PRN tylenol available. Consider CXR tomorrow, CXR on admit with atelectasis. May show as pneumonia with hydration. Incentive spirometry ordered. Mycoplasma, legionella, and influenza to rule out atypical pneumonia.     -Pancreatic cancer on current chemotherapy: Neoadjuvant chemo on hold per oncology. Patient on TPN per oncology team. Possible whipple in the future. Has had bile duct stent placed recently. PRN antiemetics available.     -Cancer related pain: Continue MS contin scheduled, holding parameters in place to prevent hypotension and/or respiratory depression. PRN pain medication also available.     -Paroxysmal atrial fibrillation, chronically anticoagulated with Eliquis: Currently rate controlled and in NSR. Patient as on flecainide. Will obtain ABG to evaluate QTc while on flecainide and abx therapy. Continue close telemetry monitoring.     -Essential hypertension: BP stable. Patient not on any blood pressure medications. He is on Flecainide for afib. Closely monitor BP per hospital protocol.     -Stage III CKD: Stable. Closely monitor renal function. Avoid nephrotoxins. CMP in AM.     -Non insulin dependent type II diabetes mellitus:  Patient with hyperglycemia. Will obtain hgbA1c to evaluate glycemic control. Blood glucose levels 170s-250s. Hold home Actos to prevent hypoglycemia. SSI in place, will increase to moderate dose. Accuchecks QAC and QHS. Titrate insulin therapy as necessary.     -Normocytic anemia: Stable. Closely monitor H&H, transfuse if hemoglobin were to drop below 7.0. Likely anemia of chronic disease.     -COPD without acute exacerbation: PRN nebs available. Continue Singulair, Asmanex, and Stiolto inhalers. Supplemental O2 as necessary to titrate SpO2 > 90%.     -Gout: Colchicine and allopurinol.     -GERD: Continue Protonix     -Anxiety/Depression: Continue Prozac and as needed ativan.      -Status post port-a-cath placement: Line care per protocol.     -Malnutrition: Continue Marinol. Patient on TPN. Nutrition consult.     -Hypomagnesemia: Replace per protocol, Mag level in AM. Monitor all electrolytes and replace per protocol as necessary, CMP mag and phos levels in AM.     --------------------------------------------------  DVT Prophylaxis: Eliquis   GI Prophylaxis: Protonix   Activity: Bed rest     The patient is high risk due to the following diagnoses/reasons:  Sepsis, pancreatic cancer, malnutrition, poor appetite, recent chemotx, CKD, HTN, DM    I have discussed the patient's assessment and plan with the patient and patient's wife present at bedside as well as nursing staff.     Disposition: Home when medically stable.       Sarah Arce PA-C  Hospitalist Service --    Pager: [phone]    12/08/18  2:12 PM    Attending Physician: Cindy Valdez MD    ----------------------------------------------------------------------------------------------------------------------

## 2018-12-08 NOTE — PROGRESS NOTES
Discharge Planning Assessment   Jameel     Patient Name: Todd Phillips  MRN: [id number]  Today's Date: 12/8/2018    Admit Date: 12/6/2018    Discharge Needs Assessment     Row Name 12/08/18 1137       Living Environment    Lives With  spouse Pt lives at home with his spouse.    Current Living Arrangements  home/apartment/condo    Quality of Family Relationships  supportive;non-existent;involved Pt has good family support.    Able to Return to Prior Arrangements  yes       Transition Planning    Patient/Family Anticipates Transition to  home    Transportation Anticipated  family or friend will provide       Discharge Needs Assessment    Equipment Currently Used at Home  walker, rolling;shower chair    Equipment Needed After Discharge  none    Outpatient/Agency/Support Group Needs  homecare agency Pt utilizes [facility]. [facility] provides TPN.     Discharge Facility/Level of Care Needs  home with home health [facility] to be contacted at discharge and made aware.        Discharge Plan     Row Name 12/08/18 1140       Plan    Plan  Pt lives at home with his spouse and plans to return home at discharge. Pt utilizes [facility]. [facility] to be contacted at discharge and made aware. [facility] provides TPN. Pt has a rolling walker and shower chair at home. Pt's PCP is Dr. Alves. Pt does not have a POA or advance directive. Pt utilizes [facility] in [place] for prescriptions. Pt's family to provide transportation at discharge. SS will follow and assist as needed with dishcarge planning.     Patient/Family in Agreement with Plan  yes     Demographic Summary     Row Name 12/08/18 1137       General Information    Admission Type  inpatient    Referral Source  nursing    Reason for Consult  discharge planning    Preferred Language  English     Used During This Interaction  no     Krystal Frias

## 2018-12-08 NOTE — PLAN OF CARE
Problem: Skin Injury Risk (Adult)  Goal: Skin Health and Integrity  Outcome: Ongoing (interventions implemented as appropriate)      Problem: Patient Care Overview  Goal: Individualization and Mutuality  Outcome: Ongoing (interventions implemented as appropriate)    Goal: Discharge Needs Assessment  Outcome: Ongoing (interventions implemented as appropriate)    Goal: Interprofessional Rounds/Family Conf  Outcome: Ongoing (interventions implemented as appropriate)      Problem: Cardiac Output Decreased (Adult)  Goal: Identify Related Risk Factors and Signs and Symptoms  Outcome: Ongoing (interventions implemented as appropriate)    Goal: Effective Tissue Perfusion  Outcome: Ongoing (interventions implemented as appropriate)      Problem: Patient Care Overview  Goal: Plan of Care Review  Outcome: Ongoing (interventions implemented as appropriate)    Goal: Individualization and Mutuality  Outcome: Ongoing (interventions implemented as appropriate)    Goal: Discharge Needs Assessment  Outcome: Ongoing (interventions implemented as appropriate)    Goal: Interprofessional Rounds/Family Conf  Outcome: Ongoing (interventions implemented as appropriate)

## 2018-12-08 NOTE — PROGRESS NOTES
PROGRESS NOTE         Patient Identification:  Name:  Todd Phillips  Age:  68 y.o.  Sex:  male  :  1950  MRN:  [id number]  Visit Number:  [id number]  Primary Care Provider:  Adiel Denney MD      ----------------------------------------------------------------------------------------------------------------------  Subjective       Chief Complaints:    Chest Pain and Fever        Interval History:      He is feeling better this morning, nausea has improved sinec last night, for now. No fever reported overnight. White count is 14.56 elevated from 18. CRP level is 3.15 elevated from 2.00. Lactic acid is 2.0.Blood and urine cultures are still in process. Case discussed with Dr. Gold, since he has a significant history for bacteremia, and plans for Whipple surgery soon, would like to admit him to continue antibiotic therapy and TPN. Case discussed with Dr. Valdez and she has accepted him for inpatient. Thank you. ID will continue to follow along with you.    Review of Systems:    Constitutional: no fever, chills and night sweats. No appetite change or unexpected weight change. No fatigue.  Eyes: no eye drainage, itching or redness.  HEENT: no mouth sores, dysphagia or nose bleed.  Respiratory: no for shortness of breath, cough or production of sputum.  Cardiovascular: no chest pain, no palpitations, no orthopnea.  Gastrointestinal: no nausea, vomiting or diarrhea. No abdominal pain, hematemesis or rectal bleeding.  Genitourinary: no dysuria or polyuria.  Hematologic/lymphatic: no lymph node abnormalities, no easy bruising or easy bleeding.  Musculoskeletal: no muscle or joint pain.  Skin: No rash and no itching.  Neurological: no loss of consciousness, no seizure, no headache.  Behavioral/Psych: no depression or suicidal ideation.  Endocrine: no hot flashes.  Immunologic:  negative.  ----------------------------------------------------------------------------------------------------------------------      Objective       Current [facility] Meds:    allopurinol 100 mg Oral Daily   apixaban 5 mg Oral Q12H   cefepime 2 g Intravenous Q8H   cholecalciferol 1,000 Units Oral Daily   colchicine 0.6 mg Oral Daily   dronabinol 5 mg Oral BID AC   fat emulsion 250 mL Intravenous Once per day on Mon Wed Fri   And      [START ON 12/10/2018] trace minerals + multivitamin IVPB  Intravenous Once per day on Mon Wed Fri   flecainide 50 mg Oral Q12H   FLUoxetine 20 mg Oral BID   insulin aspart 0-7 Units Subcutaneous Q6H   lansoprazole 30 mg Oral BID AC   linaclotide 145 mcg Oral QAM AC   mometasone 2 puff Inhalation Nightly   montelukast 10 mg Oral Daily   Morphine 30 mg Oral Q12H   pantoprazole 40 mg Oral QAM   pioglitazone 30 mg Oral Daily   sodium chloride 3 mL Intravenous Q12H   tiotropium bromide-olodaterol 2 puff Inhalation Daily       Adult [facility] [employer] TPN  Last Rate: 80 mL/hr at 12/08/18 0925   Pharmacy Consult       ----------------------------------------------------------------------------------------------------------------------    Vital Signs:  Temp:  [96.2 °F (35.7 °C)-98 °F (36.7 °C)] 97.7 °F (36.5 °C)  Heart Rate:  [70-83] 70  Resp:  [16-18] 18  BP: (112-147)/(71-93) 114/74  Mean Arterial Pressure (Non-Invasive) for the past 24 hrs (Last 3 readings):   Noninvasive MAP (mmHg)   12/08/18 0600 89   12/08/18 0400 91   12/08/18 0000 94     SpO2 Percentage    12/08/18 0124 12/08/18 0600 12/08/18 0714   SpO2: 93% 98% 98%     SpO2:  [93 %-98 %] 98 %  on   ;   Device (Oxygen Therapy): room air    Body mass index is 26.31 kg/m².  Wt Readings from Last 3 Encounters:   12/06/18 88 kg (194 lb)   12/04/18 88.4 kg (194 lb 12.8 oz)   11/27/18 90.7 kg (200 lb)        Intake/Output Summary (Last 24 hours) at 12/8/2018 1009  Last data filed at 12/8/2018 0714  Gross per 24 hour   Intake 480 ml   Output  1200 ml   Net -720 ml     Adult [facility] [employer] TPN  Diet Regular; Consistent Carbohydrate  ----------------------------------------------------------------------------------------------------------------------    Physical exam:    Constitutional:  Well-developed and well-nourished.  No respiratory distress.      HENT:  Head:  Normocephalic and atraumatic.  Mouth:  Moist mucous membranes.    Eyes:  Conjunctivae and EOM are normal.  No scleral icterus.    Neck:  Neck supple.  No JVD present.    Cardiovascular:  Normal rate, regular rhythm and normal heart sounds with no murmur. No edema.  Pulmonary/Chest:  No respiratory distress, no wheezes, no crackles, with normal breath sounds and good air movement.  Abdominal:  Soft.  Bowel sounds are normal.  No distension and no tenderness.   Musculoskeletal:  No edema, no tenderness, and no deformity.  No red or swollen joints anywhere.    Neurological:  Alert and oriented to person, place, and time. No facial droop.  No slurred speech.   Skin:  Skin is warm and dry. No rash noted. No pallor. Right chest port a cath without erythema or drainage, currently accessed and nurse reports no problems.    ----------------------------------------------------------------------------------------------------------------------  I have personally reviewed the EKG/Telemetry strips   ----------------------------------------------------------------------------------------------------------------------  Results from last 7 days   Lab Units  12/06/18   2305   TROPONIN I ng/mL  0.007       Results from last 7 days   Lab Units  12/07/18   0705   TRIGLYCERIDES mg/dL  127     Results from last 7 days   Lab Units  12/06/18   2319   PH, ARTERIAL pH units  7.530*   PO2 ART mm Hg  74.8*   PCO2, ARTERIAL mm Hg  29.7*   HCO3 ART mmol/L  24.3     Results from last 7 days   Lab Units  12/08/18   0816  12/08/18   0325  12/07/18   1141  12/07/18   0705  12/06/18   2305   CRP mg/dL   --    --    --   3.15*   2.00*   LACTATE mmol/L  2.0   --   2.1*  2.5*  1.3   WBC 10*3/mm3   --   14.56*   --   10.68  10.72   HEMOGLOBIN g/dL   --   11.1*   --   11.3*  11.3*   HEMATOCRIT %   --   34.3*   --   34.6*  33.9*   MCV fL   --   91.5   --   91.3  89.4   MCHC g/dL   --   32.4*   --   32.7*  33.3   PLATELETS 10*3/mm3   --   273   --   282  323   INR    --    --    --    --   1.56*     Results from last 7 days   Lab Units  12/08/18   0325  12/07/18   0705  12/06/18   2305  12/04/18   1149   SODIUM mmol/L  130*  131*  132*  136   POTASSIUM mmol/L  4.4  4.2  4.2  4.1   MAGNESIUM mg/dL  2.6  1.7   --    --    CHLORIDE mmol/L  100  102  99  100   CO2 mmol/L  24.8  20.6*  24.0*  28.8   BUN mg/dL  30*  30*  33*  36*   CREATININE mg/dL  1.20  1.24  1.27  1.37*   EGFR IF NONAFRICN AM mL/min/1.73  60*  58*  56*  51*   CALCIUM mg/dL  9.1  8.6  9.1  9.8   GLUCOSE mg/dL  251*  251*  172*  128*   ALBUMIN g/dL   --    --   3.80  4.20   BILIRUBIN mg/dL   --    --   0.4  0.3   ALK PHOS U/L   --    --   102  118   AST (SGOT) U/L   --    --   18  21   ALT (SGPT) U/L   --    --   18  16   Estimated Creatinine Clearance: 71.3 mL/min (by C-G formula based on SCr of 1.2 mg/dL).  Ammonia   Date Value Ref Range Status   12/06/2018 20 16 - 60 umol/L Final       Glucose   Date/Time Value Ref Range Status   12/08/2018 0718 177 (H) 70 - 130 mg/dL Final   12/08/2018 0606 196 (H) 70 - 130 mg/dL Final   12/08/2018 0030 259 (H) 70 - 130 mg/dL Final   12/07/2018 1959 243 (H) 70 - 130 mg/dL Final   12/07/2018 1633 231 (H) 70 - 130 mg/dL Final   12/07/2018 1109 294 (H) 70 - 130 mg/dL Final   12/07/2018 0650 252 (H) 70 - 130 mg/dL Final     Lab Results   Component Value Date    HGBA1C 6.40 (H) 11/18/2018     Lab Results   Component Value Date    TSH 1.502 11/18/2018    FREET4 1.14 10/30/2018       Blood Culture   Date Value Ref Range Status   12/06/2018 No growth at 24 hours  Preliminary   12/06/2018 No growth at 24 hours  Preliminary     Urine Culture   Date Value  Ref Range Status   12/06/2018 No growth at 24 hours  Preliminary              Pain Management Panel     Pain Management Panel Latest Ref Rng & Units 10/16/2018 9/12/2018    CREATININE UR mg/dL 208.8 -    AMPHETAMINES SCREEN, URINE Negative - Negative    BARBITURATES SCREEN Negative - Negative    BENZODIAZEPINE SCREEN, URINE Negative - Negative    BUPRENORPHINE Negative - Negative    COCAINE SCREEN, URINE Negative - Negative    METHADONE SCREEN, URINE Negative - Negative          I have personally reviewed the above laboratory results.   ----------------------------------------------------------------------------------------------------------------------  Imaging Results (last 24 hours)     Procedure Component Value Units Date/Time    XR Chest PA & Lateral [[id number]] Collected:  12/07/18 1505     Updated:  12/07/18 1508    Narrative:       EXAMINATION: XR CHEST PA AND LATERAL-      CLINICAL INDICATION: fever, cough          COMPARISON: 12/6/2018      TECHNIQUE: XR CHEST PA AND LATERAL-      FINDINGS:   PowerPort stable in the superior vena cava   Heart and mediastinal contours are unremarkable.   No pneumothorax.   Bony and soft tissue structures are unremarkable.            Impression:       No radiographic evidence of acute cardiac or pulmonary disease.     This report was finalized on 12/7/2018 3:06 PM by Dr. Ernesto Bell MD.       XR Chest 1 View [[id number]] Collected:  12/07/18 0737     Updated:  12/07/18 1009    Narrative:       XR CHEST 1 VIEW-     CLINICAL INDICATION: fever          COMPARISON: 10/30/2018      TECHNIQUE: Single frontal view of the chest.     FINDINGS:     Left basilar atelectasis.  The cardiac silhouette is normal. The pulmonary vasculature is  unremarkable.  There is no evidence of an acute osseous abnormality.   There are no suspicious-appearing parenchymal soft tissue nodules.            Impression:       Left basilar atelectasis.         This report was finalized on 12/7/2018 10:07 AM  by Dr. Ernesto Bell MD.           I have personally reviewed the above radiology results.   ----------------------------------------------------------------------------------------------------------------------    Assessment/Plan       Assessment/Plan     ASSESSMENT:  1. Severe sepsis with lactic acid greater than 2 on admission  2. Fever of unknown origin  3. Immunosupression            PLAN:    He is feeling better this morning, nausea has improved sinec last night, for now. No fever reported overnight. White count is 14.56 elevated from 12/08/18. CRP level is 3.15 elevated from 2.00. Lactic acid is 2.0.Blood and urine cultures are still in process. Case discussed with Dr. Gold, since he has a significant history for bacteremia, and plans for Whipple surgery soon, would like to admit him to continue antibiotic therapy and TPN. Case discussed with Dr. Valdez and she has accepted him for inpatient. Thank you. ID will continue to follow along with you.    Patient had a prior episode of bacteremia and is at very high risk for relapsing bacteremia.     In the setting of risk of relapsing bacteremia vancomycin pharmacy to dose and Cefepime and Micafungin 100 mg IV q 24 hrs was initiated.     Continue TPN as previously recommended per oncology team.      Continue to monitor blood cultures closely.     Code Status:   Code Status and Medical Interventions:   Ordered at: 12/07/18 0635     Code Status:    CPR     Medical Interventions (Level of Support Prior to Arrest):    Full       Marianela Zepeda, VIMAL  12/08/18  10:09 AM

## 2018-12-08 NOTE — PROGRESS NOTES
Pharmacy was consulted for vancomycin dosing for bacteremia.  Based on pt parameters will initiate vancomycin at 1500mg iv q18hrs to target trough levels of 15-20 mg/L.  Pharmacy will continue to follow and obtain trough level once steady state is achieved.  Thank you.

## 2018-12-09 ENCOUNTER — APPOINTMENT (OUTPATIENT)
Dept: GENERAL RADIOLOGY | Facility: HOSPITAL | Age: 68
End: 2018-12-09

## 2018-12-09 ENCOUNTER — APPOINTMENT (OUTPATIENT)
Dept: CT IMAGING | Facility: HOSPITAL | Age: 68
End: 2018-12-09

## 2018-12-09 LAB
ALBUMIN SERPL-MCNC: 3.2 G/DL (ref 3.4–4.8)
ALBUMIN/GLOB SERPL: 1.1 G/DL (ref 1.5–2.5)
ALP SERPL-CCNC: 77 U/L (ref 40–129)
ALT SERPL W P-5'-P-CCNC: 10 U/L (ref 10–44)
ANION GAP SERPL CALCULATED.3IONS-SCNC: 8.7 MMOL/L (ref 3.6–11.2)
AST SERPL-CCNC: 22 U/L (ref 10–34)
BACTERIA SPEC AEROBE CULT: NO GROWTH
BASOPHILS # BLD AUTO: 0.03 10*3/MM3 (ref 0–0.3)
BASOPHILS NFR BLD AUTO: 0.4 % (ref 0–2)
BILIRUB SERPL-MCNC: 0.2 MG/DL (ref 0.2–1.8)
BUN BLD-MCNC: 33 MG/DL (ref 7–21)
BUN/CREAT SERPL: 26.4 (ref 7–25)
CALCIUM SPEC-SCNC: 8.8 MG/DL (ref 7.7–10)
CHLORIDE SERPL-SCNC: 103 MMOL/L (ref 99–112)
CO2 SERPL-SCNC: 23.3 MMOL/L (ref 24.3–31.9)
CREAT BLD-MCNC: 1.25 MG/DL (ref 0.43–1.29)
CRP SERPL-MCNC: 1.05 MG/DL (ref 0–0.99)
DEPRECATED RDW RBC AUTO: 51.5 FL (ref 37–54)
EOSINOPHIL # BLD AUTO: 0.12 10*3/MM3 (ref 0–0.7)
EOSINOPHIL NFR BLD AUTO: 1.6 % (ref 0–7)
ERYTHROCYTE [DISTWIDTH] IN BLOOD BY AUTOMATED COUNT: 16.2 % (ref 11.5–14.5)
GFR SERPL CREATININE-BSD FRML MDRD: 57 ML/MIN/1.73
GLOBULIN UR ELPH-MCNC: 2.8 GM/DL
GLUCOSE BLD-MCNC: 127 MG/DL (ref 70–110)
GLUCOSE BLDC GLUCOMTR-MCNC: 133 MG/DL (ref 70–130)
GLUCOSE BLDC GLUCOMTR-MCNC: 141 MG/DL (ref 70–130)
GLUCOSE BLDC GLUCOMTR-MCNC: 142 MG/DL (ref 70–130)
GLUCOSE BLDC GLUCOMTR-MCNC: 160 MG/DL (ref 70–130)
HCT VFR BLD AUTO: 31.9 % (ref 42–52)
HGB BLD-MCNC: 10.2 G/DL (ref 14–18)
IMM GRANULOCYTES # BLD: 0.26 10*3/MM3 (ref 0–0.03)
IMM GRANULOCYTES NFR BLD: 3.4 % (ref 0–0.5)
L PNEUMO1 AG UR QL IA: NEGATIVE
LYMPHOCYTES # BLD AUTO: 1.37 10*3/MM3 (ref 1–3)
LYMPHOCYTES NFR BLD AUTO: 18 % (ref 16–46)
M PNEUMO IGM SER QL: NEGATIVE
MAGNESIUM SERPL-MCNC: 2 MG/DL (ref 1.7–2.6)
MCH RBC QN AUTO: 29.2 PG (ref 27–33)
MCHC RBC AUTO-ENTMCNC: 32 G/DL (ref 33–37)
MCV RBC AUTO: 91.4 FL (ref 80–94)
MONOCYTES # BLD AUTO: 0.82 10*3/MM3 (ref 0.1–0.9)
MONOCYTES NFR BLD AUTO: 10.8 % (ref 0–12)
NEUTROPHILS # BLD AUTO: 5.02 10*3/MM3 (ref 1.4–6.5)
NEUTROPHILS NFR BLD AUTO: 65.8 % (ref 40–75)
OSMOLALITY SERPL CALC.SUM OF ELEC: 278.9 MOSM/KG (ref 273–305)
PHOSPHATE SERPL-MCNC: 3.8 MG/DL (ref 2.7–4.5)
PLATELET # BLD AUTO: 240 10*3/MM3 (ref 130–400)
PMV BLD AUTO: 9.9 FL (ref 6–10)
POTASSIUM BLD-SCNC: 4.8 MMOL/L (ref 3.5–5.3)
PROT SERPL-MCNC: 6 G/DL (ref 6–8)
RBC # BLD AUTO: 3.49 10*6/MM3 (ref 4.7–6.1)
SODIUM BLD-SCNC: 135 MMOL/L (ref 135–153)
WBC NRBC COR # BLD: 7.62 10*3/MM3 (ref 4.5–12.5)

## 2018-12-09 PROCEDURE — 25010000003 MICAFUNGIN SODIUM 100 MG RECONSTITUTED SOLUTION 1 EACH VIAL: Performed by: PHYSICIAN ASSISTANT

## 2018-12-09 PROCEDURE — 84100 ASSAY OF PHOSPHORUS: CPT | Performed by: PHYSICIAN ASSISTANT

## 2018-12-09 PROCEDURE — 82962 GLUCOSE BLOOD TEST: CPT

## 2018-12-09 PROCEDURE — 71250 CT THORAX DX C-: CPT | Performed by: RADIOLOGY

## 2018-12-09 PROCEDURE — 71046 X-RAY EXAM CHEST 2 VIEWS: CPT

## 2018-12-09 PROCEDURE — 63710000001 INSULIN ASPART PER 5 UNITS

## 2018-12-09 PROCEDURE — 63710000001 DRONABINOL PER 2.5 MG: Performed by: INTERNAL MEDICINE

## 2018-12-09 PROCEDURE — 25010000002 VANCOMYCIN 5 G RECONSTITUTED SOLUTION 5,000 MG VIAL

## 2018-12-09 PROCEDURE — 86140 C-REACTIVE PROTEIN: CPT | Performed by: HOSPITALIST

## 2018-12-09 PROCEDURE — 71046 X-RAY EXAM CHEST 2 VIEWS: CPT | Performed by: RADIOLOGY

## 2018-12-09 PROCEDURE — 25010000002 ONDANSETRON PER 1 MG: Performed by: INTERNAL MEDICINE

## 2018-12-09 PROCEDURE — 25010000002 LORAZEPAM PER 2 MG: Performed by: HOSPITALIST

## 2018-12-09 PROCEDURE — 87899 AGENT NOS ASSAY W/OPTIC: CPT | Performed by: PHYSICIAN ASSISTANT

## 2018-12-09 PROCEDURE — 99232 SBSQ HOSP IP/OBS MODERATE 35: CPT | Performed by: HOSPITALIST

## 2018-12-09 PROCEDURE — 80053 COMPREHEN METABOLIC PANEL: CPT | Performed by: PHYSICIAN ASSISTANT

## 2018-12-09 PROCEDURE — 85025 COMPLETE CBC W/AUTO DIFF WBC: CPT | Performed by: INTERNAL MEDICINE

## 2018-12-09 PROCEDURE — 25010000002 PROMETHAZINE PER 50 MG: Performed by: NURSE PRACTITIONER

## 2018-12-09 PROCEDURE — 71250 CT THORAX DX C-: CPT

## 2018-12-09 PROCEDURE — 86738 MYCOPLASMA ANTIBODY: CPT | Performed by: PHYSICIAN ASSISTANT

## 2018-12-09 PROCEDURE — 25010000002 CEFEPIME 2 G/NS 100 ML SOLUTION: Performed by: INTERNAL MEDICINE

## 2018-12-09 PROCEDURE — 83735 ASSAY OF MAGNESIUM: CPT | Performed by: PHYSICIAN ASSISTANT

## 2018-12-09 PROCEDURE — 94799 UNLISTED PULMONARY SVC/PX: CPT

## 2018-12-09 RX ORDER — SODIUM CHLORIDE 9 MG/ML
INJECTION, SOLUTION INTRAVENOUS
Status: COMPLETED
Start: 2018-12-09 | End: 2018-12-09

## 2018-12-09 RX ORDER — LORAZEPAM 2 MG/ML
1 INJECTION INTRAMUSCULAR ONCE
Status: COMPLETED | OUTPATIENT
Start: 2018-12-09 | End: 2018-12-09

## 2018-12-09 RX ADMIN — APIXABAN 5 MG: 5 TABLET, FILM COATED ORAL at 20:15

## 2018-12-09 RX ADMIN — MICAFUNGIN SODIUM 100 MG: 20 INJECTION, POWDER, LYOPHILIZED, FOR SOLUTION INTRAVENOUS at 20:00

## 2018-12-09 RX ADMIN — FLECAINIDE ACETATE 50 MG: 50 TABLET ORAL at 08:20

## 2018-12-09 RX ADMIN — LEUCINE, PHENYLALANINE, LYSINE, METHIONINE, ISOLEUCINE, VALINE, HISTIDINE, THREONINE, TRYPTOPHAN, ALANINE, GLYCINE, ARGININE, PROLINE, SERINE, TYROSINE, SODIUM ACETATE, DIBASIC POTASSIUM PHOSPHATE, MAGNESIUM CHLORIDE, SODIUM CHLORIDE, CALCIUM CHLORIDE, DEXTROSE
365; 280; 290; 200; 300; 290; 240; 210; 90; 1035; 515; 575; 340; 250; 20; 340; 261; 51; 59; 33; 20 INJECTION INTRAVENOUS at 17:15

## 2018-12-09 RX ADMIN — SODIUM CHLORIDE 50 ML: 9 INJECTION, SOLUTION INTRAVENOUS at 03:24

## 2018-12-09 RX ADMIN — VANCOMYCIN HYDROCHLORIDE 1500 MG: 5 INJECTION, POWDER, LYOPHILIZED, FOR SOLUTION INTRAVENOUS at 17:15

## 2018-12-09 RX ADMIN — CHOLECALCIFEROL TAB 10 MCG (400 UNIT) 1000 UNITS: 10 TAB at 08:21

## 2018-12-09 RX ADMIN — DRONABINOL 5 MG: 2.5 CAPSULE ORAL at 08:22

## 2018-12-09 RX ADMIN — PANTOPRAZOLE SODIUM 40 MG: 40 TABLET, DELAYED RELEASE ORAL at 06:32

## 2018-12-09 RX ADMIN — ONDANSETRON 4 MG: 4 TABLET, FILM COATED ORAL at 06:55

## 2018-12-09 RX ADMIN — MONTELUKAST SODIUM 10 MG: 10 TABLET, COATED ORAL at 08:21

## 2018-12-09 RX ADMIN — APIXABAN 5 MG: 5 TABLET, FILM COATED ORAL at 08:20

## 2018-12-09 RX ADMIN — SODIUM CHLORIDE 50 ML: 9 INJECTION, SOLUTION INTRAVENOUS at 16:15

## 2018-12-09 RX ADMIN — MORPHINE SULFATE 30 MG: 30 TABLET, EXTENDED RELEASE ORAL at 10:37

## 2018-12-09 RX ADMIN — FLUOXETINE 20 MG: 20 CAPSULE ORAL at 08:20

## 2018-12-09 RX ADMIN — SODIUM CHLORIDE 50 ML: 9 INJECTION, SOLUTION INTRAVENOUS at 09:46

## 2018-12-09 RX ADMIN — ONDANSETRON 4 MG: 2 INJECTION, SOLUTION INTRAMUSCULAR; INTRAVENOUS at 21:26

## 2018-12-09 RX ADMIN — ONDANSETRON 4 MG: 2 INJECTION, SOLUTION INTRAMUSCULAR; INTRAVENOUS at 12:14

## 2018-12-09 RX ADMIN — PROMETHAZINE HYDROCHLORIDE 12.5 MG: 25 INJECTION INTRAMUSCULAR; INTRAVENOUS at 03:24

## 2018-12-09 RX ADMIN — LORAZEPAM 1 MG: 2 INJECTION INTRAMUSCULAR; INTRAVENOUS at 15:26

## 2018-12-09 RX ADMIN — SODIUM CHLORIDE, PRESERVATIVE FREE 3 ML: 5 INJECTION INTRAVENOUS at 11:04

## 2018-12-09 RX ADMIN — PROMETHAZINE HYDROCHLORIDE 12.5 MG: 25 INJECTION INTRAMUSCULAR; INTRAVENOUS at 16:13

## 2018-12-09 RX ADMIN — PROMETHAZINE HYDROCHLORIDE 12.5 MG: 25 INJECTION INTRAMUSCULAR; INTRAVENOUS at 09:45

## 2018-12-09 RX ADMIN — ALLOPURINOL 100 MG: 100 TABLET ORAL at 08:20

## 2018-12-09 RX ADMIN — VANCOMYCIN HYDROCHLORIDE 1500 MG: 5 INJECTION, POWDER, LYOPHILIZED, FOR SOLUTION INTRAVENOUS at 00:55

## 2018-12-09 RX ADMIN — COLCHICINE 0.6 MG: 0.6 TABLET, FILM COATED ORAL at 08:20

## 2018-12-09 RX ADMIN — INSULIN ASPART 2 UNITS: 100 INJECTION, SOLUTION INTRAVENOUS; SUBCUTANEOUS at 17:21

## 2018-12-09 RX ADMIN — CEFEPIME 2 G: 2 INJECTION, POWDER, FOR SOLUTION INTRAVENOUS at 08:23

## 2018-12-09 RX ADMIN — SODIUM CHLORIDE, PRESERVATIVE FREE 3 ML: 5 INJECTION INTRAVENOUS at 20:17

## 2018-12-09 RX ADMIN — FLUOXETINE 20 MG: 20 CAPSULE ORAL at 20:15

## 2018-12-09 RX ADMIN — MORPHINE SULFATE 30 MG: 30 TABLET, EXTENDED RELEASE ORAL at 20:16

## 2018-12-09 RX ADMIN — FLECAINIDE ACETATE 50 MG: 50 TABLET ORAL at 20:15

## 2018-12-09 RX ADMIN — CEFEPIME 2 G: 2 INJECTION, POWDER, FOR SOLUTION INTRAVENOUS at 15:27

## 2018-12-09 NOTE — PROGRESS NOTES
[facility]     Patient Identification:  Name:  Todd Phillips  Age:  68 y.o.  Sex:  male  :  1950  MRN:  [id number]  Visit Number:  [id number]  Primary Care Provider:  Adiel Denney MD    Length of stay:  1     I have reviewed labs/imaging/records from this hospitalization, including ER staff, consultants, and observation unit staff's H&P and progress notes to establish a comprehensive understanding of this patient's clinical hospital course, as well as to establish a transition of care appropriately.    ----------------------------------------------------------------------------------------------------------------------  Subjective   Chief complaint: F/U for sepsis    History of Presenting Illness:  Patient is a 68-year-old male with past medical history significant for pancreatic cancer, paroxysmal atrial fibrillation, hypertension, stage III CK D, COPD non-oxygen dependent, non-insulin-dependent diabetes, gout, GERD, malnutrition on TPN, and anxiety/depression that was initially admitted to the observation unit with fever and severe sepsis.  Due to need for empiric IV antibiotics and prolonged antibiotics, patient was admitted patient today.     Subjective:  Patient seen and examined this morning with son at the bedside.  States that he is nauseated after taking pills and has persistent nausea so does not feel well.  Denies any vomiting.  Has abdominal pain.  Denies any chest pain or shortness of breath or cough.  Very poor oral intake because of persistent nausea.  Tolerating TPN.  Afebrile and no events overnight.    ----------------------------------------------------------------------------------------------------------------------  Objective     Current [facility] Meds:    allopurinol 100 mg Oral Daily   apixaban 5 mg Oral Q12H   cefepime 2 g Intravenous Q8H   cholecalciferol 1,000 Units Oral Daily   colchicine 0.6 mg Oral Daily    dronabinol 5 mg Oral BID AC   fat emulsion 250 mL Intravenous Once per day on Mon Wed Fri   And      [START ON 12/10/2018] trace minerals + multivitamin IVPB  Intravenous Once per day on Mon Wed Fri   flecainide 50 mg Oral Q12H   FLUoxetine 20 mg Oral BID   insulin aspart 0-7 Units Subcutaneous Q6H   lansoprazole 30 mg Oral BID AC   linaclotide 145 mcg Oral QAM AC   micafungin (MYCAMINE)  mg Intravenous Q24H   mometasone 2 puff Inhalation Nightly   montelukast 10 mg Oral Daily   Morphine 30 mg Oral Q12H   pantoprazole 40 mg Oral QAM   sodium chloride 3 mL Intravenous Q12H   tiotropium bromide-olodaterol 2 puff Inhalation Daily   vancomycin 1,500 mg Intravenous Q18H       Adult [facility] TPN  Last Rate: 80 mL/hr at 12/09/18 1715   Pharmacy Consult       ----------------------------------------------------------------------------------------------------------------------  Vital Signs:  Temp:  [97.4 °F (36.3 °C)-99.1 °F (37.3 °C)] 99.1 °F (37.3 °C)  Heart Rate:  [70-76] 76  Resp:  [17-20] 20  BP: (111-166)/(72-95) 166/95  No data found.  SpO2 Percentage    12/09/18 0650 12/09/18 1100 12/09/18 1517   SpO2: 97% 97% 93%     SpO2:  [93 %-99 %] 93 %  on   ;   Device (Oxygen Therapy): nasal cannula    Body mass index is 26.83 kg/m².  Wt Readings from Last 3 Encounters:   12/09/18 89.7 kg (197 lb 12.8 oz)   12/04/18 88.4 kg (194 lb 12.8 oz)   11/27/18 90.7 kg (200 lb)        Intake/Output Summary (Last 24 hours) at 12/9/2018 1720  Last data filed at 12/9/2018 1412  Gross per 24 hour   Intake 360 ml   Output 1875 ml   Net -1515 ml     Adult [facility] [employer] TPN  Diet Regular; Consistent Carbohydrate  ----------------------------------------------------------------------------------------------------------------------  Physical exam:  General: Comfortable, Not in distress.    HENT:  Head:  Normocephalic and atraumatic.  Mouth:  Moist mucous membranes.    Eyes:  Conjunctivae and EOM are normal.  Pupils are equal,  round, and reactive to light.  No scleral icterus.    Neck:  Neck supple.  No JVD present.  trachea midline.  Cardiovascular:  Normal rate, regular rhythm with no murmur.  Pulmonary/Chest:  No respiratory distress, no wheezes, no crackles, with coarse breath sounds in the B/L base and good air movement. Right side with port cath + without any erythema or swelling.  Abdomen:  Soft.  Bowel sounds are normal.  No distension and no tenderness. No organomegaly.  Musculoskeletal:  No edema, no tenderness, and no deformity.  No red or swollen joints anywhere.    Neurological:  Alert and oriented to person, place, and time.  No cranial nerve deficit. No focal deficits. No facial droop.  No slurred speech.   Skin:  Skin is warm and dry. No rash noted. No pallor.   Peripheral vascular:  pulses in all 4 extremities with no clubbing, no cyanosis, no edema.  Genitourinary: no landy     ----------------------------------------------------------------------------------------------------------------------  Tele:    Sinus 70s     I have personally reviewed the EKG/Telemetry strips   ----------------------------------------------------------------------------------------------------------------------  Results from last 7 days   Lab Units  12/06/18   2305   TROPONIN I ng/mL  0.007       Results from last 7 days   Lab Units  12/07/18   0705   TRIGLYCERIDES mg/dL  127     Results from last 7 days   Lab Units  12/06/18   2319   PH, ARTERIAL pH units  7.530*   PO2 ART mm Hg  74.8*   PCO2, ARTERIAL mm Hg  29.7*   HCO3 ART mmol/L  24.3     Results from last 7 days   Lab Units  12/09/18   0440  12/09/18   0412  12/08/18   0816  12/08/18   0325  12/07/18   1141  12/07/18   0705  12/06/18   2305   CRP mg/dL  1.05*   --    --    --    --   3.15*  2.00*   LACTATE mmol/L   --    --   2.0   --   2.1*  2.5*  1.3   WBC 10*3/mm3   --   7.62   --   14.56*   --   10.68  10.72   HEMOGLOBIN g/dL   --   10.2*   --   11.1*   --   11.3*  11.3*   HEMATOCRIT  %   --   31.9*   --   34.3*   --   34.6*  33.9*   MCV fL   --   91.4   --   91.5   --   91.3  89.4   MCHC g/dL   --   32.0*   --   32.4*   --   32.7*  33.3   PLATELETS 10*3/mm3   --   240   --   273   --   282  323   INR    --    --    --    --    --    --   1.56*     Results from last 7 days   Lab Units  12/09/18   0412  12/08/18   0325  12/07/18   0705  12/06/18   2305  12/04/18   1149   SODIUM mmol/L  135  130*  131*  132*  136   POTASSIUM mmol/L  4.8  4.4  4.2  4.2  4.1   MAGNESIUM mg/dL  2.0  2.6  1.7   --    --    CHLORIDE mmol/L  103  100  102  99  100   CO2 mmol/L  23.3*  24.8  20.6*  24.0*  28.8   BUN mg/dL  33*  30*  30*  33*  36*   CREATININE mg/dL  1.25  1.20  1.24  1.27  1.37*   EGFR IF NONAFRICN AM mL/min/1.73  57*  60*  58*  56*  51*   CALCIUM mg/dL  8.8  9.1  8.6  9.1  9.8   GLUCOSE mg/dL  127*  251*  251*  172*  128*   ALBUMIN g/dL  3.20*   --    --   3.80  4.20   BILIRUBIN mg/dL  0.2   --    --   0.4  0.3   ALK PHOS U/L  77   --    --   102  118   AST (SGOT) U/L  22   --    --   18  21   ALT (SGPT) U/L  10   --    --   18  16   Estimated Creatinine Clearance: 69.8 mL/min (by C-G formula based on SCr of 1.25 mg/dL).  Ammonia   Date Value Ref Range Status   12/06/2018 20 16 - 60 umol/L Final     Hemoglobin A1C   Date/Time Value Ref Range Status   12/08/2018 1513 7.00 (H) 4.50 - 5.70 % Final     Glucose   Date/Time Value Ref Range Status   12/09/2018 1642 160 (H) 70 - 130 mg/dL Final   12/09/2018 1225 141 (H) 70 - 130 mg/dL Final   12/09/2018 0601 142 (H) 70 - 130 mg/dL Final   12/08/2018 2320 176 (H) 70 - 130 mg/dL Final   12/08/2018 1630 164 (H) 70 - 130 mg/dL Final   12/08/2018 1050 177 (H) 70 - 130 mg/dL Final   12/08/2018 0718 177 (H) 70 - 130 mg/dL Final   12/08/2018 0606 196 (H) 70 - 130 mg/dL Final     Lab Results   Component Value Date    HGBA1C 7.00 (H) 12/08/2018     Lab Results   Component Value Date    TSH 1.502 11/18/2018    FREET4 1.14 10/30/2018     Blood Culture   Date Value Ref Range  Status   12/06/2018 No growth at 24 hours  Preliminary   12/06/2018 No growth at 24 hours  Preliminary     Urine Culture   Date Value Ref Range Status   12/06/2018 No growth at 24 hours  Preliminary      I have personally reviewed the above laboratory results.   ----------------------------------------------------------------------------------------------------------------------  Imaging Results (last 24 hours)     Procedure Component Value Units Date/Time    XR Chest PA & Lateral [[id number]] Collected:  12/07/18 1505     Updated:  12/07/18 1508    Narrative:       FINDINGS:   PowerPort stable in the superior vena cava   Heart and mediastinal contours are unremarkable.   No pneumothorax.   Bony and soft tissue structures are unremarkable.    Impression:       No radiographic evidence of acute cardiac or pulmonary disease.     This report was finalized on 12/7/2018 3:06 PM by Dr. Ernesto Bell MD.      I have personally reviewed the above radiology results.   ----------------------------------------------------------------------------------------------------------------------  Assessment/Plan     -Sepsis that was present on admission (lactic acid > 2, elevated CRP, and fever)  secondary to ? Pneumonia(HCAP): WBC normal today, CRP trending down hemodynamically stable. Patient at high risk of infection, has history of ESBL E coli bacteremia in August 2018 and Klebsiella bacteremia in October 2018 and is immunocompromised in setting of pancreatic cancer with recent chemotherapy. Blood cultures with no growth to date. U/A unremarkable. ID on the case. Continue with  daily Micafungin, Cefepime, and pharmacy to dose vancomycin. Continue per ID recommendations. Follow cultures for final results.  X-ray done today shows consolidation.  Will get CT chest without contrast to confirm.  Incentive spirometry ordered. Mycoplasma, legionella, and influenza negative.     -Pancreatic cancer on current chemotherapy: Neoadjuvant chemo  on hold per oncology. Patient on TPN per oncology team. Possible whipple in the future. Has had bile duct stent placed recently. PRN antiemetics available.  Patient has an appointment on 12/12 with the oncologist regarding follow-up for pancreatic surgery.    -Cancer related pain: Continue MS contin scheduled, holding parameters in place to prevent hypotension and/or respiratory depression. PRN pain medication also available.     -Paroxysmal atrial fibrillation, chronically anticoagulated with Eliquis: in NSR. Patient as on flecainide. Continue close telemetry monitoring.     -Essential hypertension: BP stable. Patient not on any blood pressure medications. Closely monitor BP per hospital protocol.     -Stage III CKD: Stable. Closely monitor renal function. Avoid nephrotoxins. CMP in AM.     -Non insulin dependent type II diabetes mellitus: controled, A1C 7.  CBG controlled. Hold home Actos to prevent hypoglycemia. SSI in place, will increase to moderate dose. Accuchecks QAC and QHS. Titrate insulin therapy as necessary.     -Normocytic anemia: Stable. Closely monitor H&H, transfuse if hemoglobin were to drop below 7.0. Likely anemia of chronic disease.     -COPD without acute exacerbation: PRN nebs available. Continue Singulair, Asmanex, and Stiolto inhalers. Supplemental O2 as necessary to titrate SpO2 > 90%.     -Gout: Colchicine and allopurinol.     -GERD: Continue Protonix     -Anxiety/Depression: Continue Prozac and as needed ativan.      -Status post port-a-cath placement: Line care per protocol.     -Malnutrition: Continue Marinol. Patient on TPN. Nutrition consult.     -Hypomagnesemia: Replace per protocol, Mag level in AM. Monitor all electrolytes and replace per protocol as necessary, CMP mag and phos levels in AM.     --------------------------------------------------  DVT Prophylaxis: Eliquis   GI Prophylaxis: Protonix       The patient is high risk due to the following diagnoses/reasons:  Sepsis,  pancreatic cancer, malnutrition, poor appetite, recent chemotx, CKD, HTN, DM    I have discussed the patient's assessment and plan with the patient and patient's son present at bedside.     Disposition: Home when medically stable.     ----------------------------------------------------------------------------------------------------------------------

## 2018-12-09 NOTE — PLAN OF CARE
Problem: Skin Injury Risk (Adult)  Goal: Identify Related Risk Factors and Signs and Symptoms  Outcome: Ongoing (interventions implemented as appropriate)    Goal: Skin Health and Integrity  Outcome: Ongoing (interventions implemented as appropriate)      Problem: Hyperglycemia, Persistent (Adult)  Goal: Signs and Symptoms of Listed Potential Problems Will be Absent, Minimized or Managed (Hyperglycemia, Persistent)  Outcome: Ongoing (interventions implemented as appropriate)      Problem: Nutrition, Parenteral (Adult)  Goal: Signs and Symptoms of Listed Potential Problems Will be Absent, Minimized or Managed (Nutrition, Parenteral)  Outcome: Ongoing (interventions implemented as appropriate)      Problem: Cardiac Output Decreased (Adult)  Goal: Identify Related Risk Factors and Signs and Symptoms  Outcome: Ongoing (interventions implemented as appropriate)    Goal: Effective Tissue Perfusion  Outcome: Ongoing (interventions implemented as appropriate)      Problem: Patient Care Overview  Goal: Plan of Care Review  Outcome: Ongoing (interventions implemented as appropriate)      Problem: Fall Risk (Adult)  Goal: Identify Related Risk Factors and Signs and Symptoms  Outcome: Ongoing (interventions implemented as appropriate)    Goal: Absence of Fall  Outcome: Ongoing (interventions implemented as appropriate)

## 2018-12-09 NOTE — NURSING NOTE
Patient and son states he has a part that connects to his inhalers and they can't find it. I called the transferring department, observation and had them check the last room patient was in they did not find it. I called respiratory and they are checking with patient to see if they may have what they patient needs. Will follow up.

## 2018-12-09 NOTE — PLAN OF CARE
Problem: Skin Injury Risk (Adult)  Goal: Identify Related Risk Factors and Signs and Symptoms  Outcome: Ongoing (interventions implemented as appropriate)   12/07/18 0953   Skin Injury Risk (Adult)   Related Risk Factors (Skin Injury Risk) advanced age     Goal: Skin Health and Integrity  Outcome: Ongoing (interventions implemented as appropriate)   12/09/18 0142   Skin Injury Risk (Adult)   Skin Health and Integrity making progress toward outcome       Problem: Hyperglycemia, Persistent (Adult)  Goal: Signs and Symptoms of Listed Potential Problems Will be Absent, Minimized or Managed (Hyperglycemia, Persistent)  Outcome: Ongoing (interventions implemented as appropriate)   12/09/18 0142   Goal/Outcome Evaluation   Problems Assessed (Hyperglycemia) all   Problems Present (Hyperglycemia) hyperglycemia       Problem: Nutrition, Parenteral (Adult)  Goal: Signs and Symptoms of Listed Potential Problems Will be Absent, Minimized or Managed (Nutrition, Parenteral)  Outcome: Ongoing (interventions implemented as appropriate)   12/09/18 0142   Goal/Outcome Evaluation   Problems Assessed (Parenteral Nutrition) all   Problems Present (Parenteral Nutrition) infection;malnutrition       Problem: Cardiac Output Decreased (Adult)  Goal: Identify Related Risk Factors and Signs and Symptoms  Outcome: Ongoing (interventions implemented as appropriate)   12/09/18 0142   Cardiac Output Decreased (Adult)   Related Risk Factors (Cardiac Output Decreased) disease process   Signs and Symptoms (Cardiac Output Decreased) weakness/activity intolerance;fatigue     Goal: Effective Tissue Perfusion  Outcome: Ongoing (interventions implemented as appropriate)   12/09/18 0142   Cardiac Output Decreased (Adult)   Effective Tissue Perfusion making progress toward outcome       Problem: Fall Risk (Adult)  Goal: Identify Related Risk Factors and Signs and Symptoms  Outcome: Ongoing (interventions implemented as appropriate)   12/09/18 0142   Fall  Risk (Adult)   Related Risk Factors (Fall Risk) age-related changes;confusion/agitation;fatigue/slow reaction;gait/mobility problems;sleep pattern alteration;environment unfamiliar     Goal: Absence of Fall  Outcome: Ongoing (interventions implemented as appropriate)   12/09/18 0142   Fall Risk (Adult)   Absence of Fall making progress toward outcome

## 2018-12-09 NOTE — CONSULTS
INFECTIOUS DISEASE CONSULTATION REPORT        Patient Identification:  Name:  Todd Phillips  Age:  68 y.o.  Sex:  male  :  1950  MRN:  [id number]   Visit Number:  [id number]  Primary Care Physician:  Adiel Denney MD         Subjective       Subjective     History of present illness:      Thank you Dr. Valdez for allowing us to participate in the care of your patient.  As you well know, Mr. Todd Phillips is a 68 y.o. male with past medical history significant for of A -fib, COPD, CAD, DM, GERD, hypertension, kidney stones, pancreatic cancer, history of sepsis, and renal failure, who presented to [facility] Emergency Department on 2018 for acute onset fever and worsening weakness. CRP improving at 1.05. WBC normal. Mycoplasma negative. Legionella negative. Influenza negative. Urine culture from 18 finalized as no growth. Blood cultures from 18 show no growth at this time. Lactic acid 2.0, improved from 2.5 on admission.   Chest x-ray from 18 reveals right infrahilar consolidation. KUB from 18 unremarkable.     Infectious Disease consultation was requested for antimicrobial management.      ---------------------------------------------------------------------------------------------------------------------     Review Of Systems:    Constitutional: no fever, chills and night sweats. No appetite change or unexpected weight change. No fatigue.  Eyes: no eye drainage, itching or redness.  HEENT: no mouth sores, dysphagia or nose bleed.  Respiratory: no for shortness of breath, cough or production of sputum.  Cardiovascular: no chest pain, no palpitations, no orthopnea.  Gastrointestinal: Positive nausea, no vomiting or diarrhea. Positive abdominal pain, no hematemesis or rectal bleeding.  Genitourinary: no dysuria or polyuria.  Hematologic/lymphatic: no lymph node abnormalities, no easy bruising or easy bleeding.  Musculoskeletal: no muscle or joint  pain.  Skin: No rash and no itching.  Neurological: no loss of consciousness, no seizure, no headache.  Behavioral/Psych: no depression or suicidal ideation.  Endocrine: no hot flashes.  Immunologic: negative.    ---------------------------------------------------------------------------------------------------------------------     Past Medical History    Past Medical History:   Diagnosis Date   • Antral gastritis    • Asthma    • Atrial fibrillation (CMS/Formerly [facility])     treated with oral blood thinner   • Chest pain    • COPD (chronic obstructive pulmonary disease) (CMS/Formerly [facility])    • Coronary artery disease     no stents   • Diabetes mellitus (CMS/Formerly [facility])     type 2    • Duodenitis    • Elevated cholesterol    • Emphysema of lung (CMS/Formerly [facility])    • Gallbladder disease     removed    • GERD (gastroesophageal reflux disease)    • Hyperlipidemia    • Hypertension    • Jaundice    • Kidney stone    • Kidney stones     had lithotripsy and passed 36 kidney stones as well as had one surgically removed.   • Malignant neoplasm of head of pancreas (CMS/Formerly [facility]) 9/5/2018   • Palpitations    • Pneumonia 08/2018   • Reflux esophagitis    • Renal failure     stage 3 kidney disease   • Sepsis (CMS/Formerly [facility])        Past Surgical History    Past Surgical History:   Procedure Laterality Date   • BILE DUCT STENT PLACEMENT      [facility] 2 weeks ago    • CARDIAC CATHETERIZATION  11/01/1999   • CARDIOVASCULAR STRESS TEST  09/2012   • COLONOSCOPY     • CYSTOSCOPY BLADDER STONE LITHOTRIPSY     • ECHO - CONVERTED  09/2012   • KIDNEY STONE SURGERY     • KIDNEY STONE SURGERY      open abdominal surgery   • UPPER GASTROINTESTINAL ENDOSCOPY  08/30/2012       Family History    Family History   Problem Relation Age of Onset   • Heart attack Mother    • Heart disease Mother    • Stroke Mother    • Kidney disease Mother    • Heart failure Mother    • Lung disease Father    • Tuberculosis Father    • Diabetes Sister    • Heart attack Brother    • Heart  failure Brother    • Heart disease Brother    • Diabetes Sister    • No Known Problems Brother    • No Known Problems Brother        Social History    Social History     Tobacco Use   • Smoking status: Never Smoker   • Smokeless tobacco: Never Used   Substance Use Topics   • Alcohol use: No   • Drug use: No       Allergies    Contrast dye  ---------------------------------------------------------------------------------------------------------------------     Home Medications:    Prior to Admission Medications     Prescriptions Last Dose Informant Patient Reported? Taking?    albuterol (PROVENTIL HFA;VENTOLIN HFA) 108 (90 BASE) MCG/ACT inhaler 12/6/2018 Spouse/Significant Other Yes Yes    Inhale 2 puffs Every 4 (Four) Hours As Needed for Wheezing or Shortness of Air.    albuterol (PROVENTIL) (2.5 MG/3ML) 0.083% nebulizer solution 12/6/2018 Spouse/Significant Other Yes Yes    Take 2.5 mg by nebulization 2 (Two) Times a Day As Needed for Wheezing or Shortness of Air.    allopurinol (ZYLOPRIM) 100 MG tablet 12/6/2018 Spouse/Significant Other Yes Yes    Take 100 mg by mouth Daily.    apixaban (ELIQUIS) 5 MG tablet tablet 12/6/2018 Spouse/Significant Other No Yes    Take 1 tablet by mouth Every 12 (Twelve) Hours.    cholecalciferol (VITAMIN D3) 1000 units tablet 12/6/2018 Spouse/Significant Other Yes Yes    Take 1,000 Units by mouth Daily.    colchicine 0.6 MG tablet 12/6/2018 Spouse/Significant Other No Yes    Take 1 tablet by mouth Daily.    dronabinol (MARINOL) 5 MG capsule 12/6/2018 Spouse/Significant Other No Yes    Take 1 capsule by mouth 2 (Two) Times a Day Before Meals.    flecainide (TAMBOCOR) 50 MG tablet 12/6/2018 Spouse/Significant Other No Yes    Take 1 tablet by mouth Every 12 (Twelve) Hours.    FLUoxetine (PROzac) 20 MG capsule 12/6/2018 Spouse/Significant Other Yes Yes    Take 20 mg by mouth 2 (Two) Times a Day.    lansoprazole (PREVACID) 30 MG capsule 12/6/2018 Spouse/Significant Other Yes Yes    Take 30  mg by mouth Daily.    linaclotide (LINZESS) 145 MCG capsule capsule 12/6/2018 Spouse/Significant Other No Yes    Take 1 capsule by mouth Every Morning Before Breakfast.    LORazepam (ATIVAN) 0.5 MG tablet 12/6/2018 Spouse/Significant Other No Yes    Take one to two tablets by mouth every 4 to 6 hours for nausea.    Patient taking differently:  Take 0.5 mg by mouth Every 6 (Six) Hours As Needed for Anxiety. Take one to two tablets by mouth every 4 to 6 hours for nausea.    mometasone (ASMANEX) 220 MCG/INH inhaler 12/6/2018 Spouse/Significant Other Yes Yes    Inhale 2 puffs Every Night.    montelukast (SINGULAIR) 10 MG tablet 12/6/2018 Spouse/Significant Other Yes Yes    Take 10 mg by mouth Daily.    Morphine (MS CONTIN) 30 MG 12 hr tablet 12/6/2018 Spouse/Significant Other No Yes    Take 1 tablet by mouth Every 12 (Twelve) Hours.    oxyCODONE (ROXICODONE) 5 MG immediate release tablet 12/6/2018 Pharmacy Yes Yes    Take 5 mg by mouth Every 4 (Four) Hours As Needed for Moderate Pain .    pioglitazone (ACTOS) 30 MG tablet 12/6/2018 Spouse/Significant Other Yes Yes    Take 30 mg by mouth Daily.    polyethylene glycol (MIRALAX) packet 12/6/2018 Spouse/Significant Other Yes Yes    Take 17 g by mouth Daily As Needed.    prochlorperazine (COMPAZINE) 10 MG tablet 12/6/2018 Spouse/Significant Other No Yes    Take 1 tablet by mouth Every 6 (Six) Hours As Needed for Nausea or Vomiting.    Patient taking differently:  Take 10 mg by mouth Every 4 (Four) Hours As Needed for Nausea or Vomiting.    Tiotropium Bromide-Olodaterol 2.5-2.5 MCG/ACT aerosol solution 12/6/2018 Spouse/Significant Other Yes Yes    Inhale 2 puffs Daily.    granisetron (SANCUSO) 3.1 MG/24HR 12/4/2018 Spouse/Significant Other No No    Place 1 patch on the skin as directed by provider Every 7 (Seven) Days.    nitroglycerin (NITROSTAT) 0.4 MG SL tablet Unknown Spouse/Significant Other Yes No    Place 0.4 mg under the tongue as needed for chest pain.         ---------------------------------------------------------------------------------------------------------------------    Objective       Objective     Hospital Scheduled Meds:    allopurinol 100 mg Oral Daily   apixaban 5 mg Oral Q12H   cefepime 2 g Intravenous Q8H   cholecalciferol 1,000 Units Oral Daily   colchicine 0.6 mg Oral Daily   dronabinol 5 mg Oral BID AC   fat emulsion 250 mL Intravenous Once per day on Mon Wed Fri   And      [START ON 12/10/2018] trace minerals + multivitamin IVPB  Intravenous Once per day on Mon Wed Fri   flecainide 50 mg Oral Q12H   FLUoxetine 20 mg Oral BID   insulin aspart 0-7 Units Subcutaneous Q6H   lansoprazole 30 mg Oral BID AC   linaclotide 145 mcg Oral QAM AC   micafungin (MYCAMINE)  mg Intravenous Q24H   mometasone 2 puff Inhalation Nightly   montelukast 10 mg Oral Daily   Morphine 30 mg Oral Q12H   pantoprazole 40 mg Oral QAM   sodium chloride 3 mL Intravenous Q12H   tiotropium bromide-olodaterol 2 puff Inhalation Daily   vancomycin 1,500 mg Intravenous Q18H       Adult [facility] TPN  Last Rate: 80 mL/hr at 12/08/18 0421   Pharmacy Consult     Pharmacy to dose vancomycin       ---------------------------------------------------------------------------------------------------------------------   Vital Signs:  Temp:  [97.4 °F (36.3 °C)-98.9 °F (37.2 °C)] 97.4 °F (36.3 °C)  Heart Rate:  [70-84] 71  Resp:  [17-20] 20  BP: (105-158)/(68-94) 116/72  Mean Arterial Pressure (Non-Invasive) for the past 24 hrs (Last 3 readings):   Noninvasive MAP (mmHg)   12/08/18 1500 84     SpO2 Percentage    12/09/18 0635 12/09/18 0650 12/09/18 1100   SpO2: 99% 97% 97%     SpO2:  [95 %-99 %] 97 %  on   ;   Device (Oxygen Therapy): room air    Body mass index is 26.83 kg/m².  Wt Readings from Last 3 Encounters:   12/09/18 89.7 kg (197 lb 12.8 oz)   12/04/18 88.4 kg (194 lb 12.8 oz)   11/27/18 90.7 kg (200 lb)      ---------------------------------------------------------------------------------------------------------------------     Physical Exam:    Constitutional:  Well-developed and well-nourished.  No respiratory distress.      HENT:  Head: Normocephalic and atraumatic.  Mouth:  Moist mucous membranes.    Eyes:  Conjunctivae and EOM are normal.  No scleral icterus.  Neck:  Neck supple.  No JVD present.    Cardiovascular:  Normal rate, regular rhythm and normal heart sounds with no murmur. No edema.  Pulmonary/Chest:  No respiratory distress, no wheezes, no crackles, with normal breath sounds and good air movement.  Abdominal:  Soft.  Bowel sounds are normal.  No distension. Positive tenderness.  Musculoskeletal:  No edema, no tenderness, and no deformity.  No swelling or redness of joints.  Neurological:  Alert and oriented to person, place, and time.  No facial droop.  No slurred speech.   Skin:  Skin is warm and dry.  No rash noted.  No pallor.   Psychiatric:  Normal mood and affect.  Behavior is normal.    ---------------------------------------------------------------------------------------------------------------------    Results from last 7 days   Lab Units  12/06/18   2305   TROPONIN I ng/mL  0.007       Results from last 7 days   Lab Units  12/07/18   0705   TRIGLYCERIDES mg/dL  127     Results from last 7 days   Lab Units  12/06/18   2319   PH, ARTERIAL pH units  7.530*   PO2 ART mm Hg  74.8*   PCO2, ARTERIAL mm Hg  29.7*   HCO3 ART mmol/L  24.3     Results from last 7 days   Lab Units  12/09/18   0440  12/09/18   0412  12/08/18   0816  12/08/18   0325  12/07/18   1141  12/07/18   0705  12/06/18   2305   CRP mg/dL  1.05*   --    --    --    --   3.15*  2.00*   LACTATE mmol/L   --    --   2.0   --   2.1*  2.5*  1.3   WBC 10*3/mm3   --   7.62   --   14.56*   --   10.68  10.72   HEMOGLOBIN g/dL   --   10.2*   --   11.1*   --   11.3*  11.3*   HEMATOCRIT %   --   31.9*   --   34.3*   --   34.6*  33.9*   MCV fL   --    91.4   --   91.5   --   91.3  89.4   MCHC g/dL   --   32.0*   --   32.4*   --   32.7*  33.3   PLATELETS 10*3/mm3   --   240   --   273   --   282  323   INR    --    --    --    --    --    --   1.56*     Results from last 7 days   Lab Units  12/09/18   0412  12/08/18   0325  12/07/18   0705  12/06/18   2305  12/04/18   1149   SODIUM mmol/L  135  130*  131*  132*  136   POTASSIUM mmol/L  4.8  4.4  4.2  4.2  4.1   MAGNESIUM mg/dL  2.0  2.6  1.7   --    --    CHLORIDE mmol/L  103  100  102  99  100   CO2 mmol/L  23.3*  24.8  20.6*  24.0*  28.8   BUN mg/dL  33*  30*  30*  33*  36*   CREATININE mg/dL  1.25  1.20  1.24  1.27  1.37*   EGFR IF NONAFRICN AM mL/min/1.73  57*  60*  58*  56*  51*   CALCIUM mg/dL  8.8  9.1  8.6  9.1  9.8   GLUCOSE mg/dL  127*  251*  251*  172*  128*   ALBUMIN g/dL  3.20*   --    --   3.80  4.20   BILIRUBIN mg/dL  0.2   --    --   0.4  0.3   ALK PHOS U/L  77   --    --   102  118   AST (SGOT) U/L  22   --    --   18  21   ALT (SGPT) U/L  10   --    --   18  16   Estimated Creatinine Clearance: 69.8 mL/min (by C-G formula based on SCr of 1.25 mg/dL).  Ammonia   Date Value Ref Range Status   12/06/2018 20 16 - 60 umol/L Final       Hemoglobin A1C   Date/Time Value Ref Range Status   12/08/2018 1513 7.00 (H) 4.50 - 5.70 % Final     Glucose   Date/Time Value Ref Range Status   12/09/2018 1225 141 (H) 70 - 130 mg/dL Final   12/09/2018 0601 142 (H) 70 - 130 mg/dL Final   12/08/2018 2320 176 (H) 70 - 130 mg/dL Final   12/08/2018 1630 164 (H) 70 - 130 mg/dL Final   12/08/2018 1050 177 (H) 70 - 130 mg/dL Final   12/08/2018 0718 177 (H) 70 - 130 mg/dL Final   12/08/2018 0606 196 (H) 70 - 130 mg/dL Final   12/08/2018 0030 259 (H) 70 - 130 mg/dL Final     Lab Results   Component Value Date    HGBA1C 7.00 (H) 12/08/2018     Lab Results   Component Value Date    TSH 1.502 11/18/2018    FREET4 1.14 10/30/2018       Blood Culture   Date Value Ref Range Status   12/06/2018 No growth at 2 days  Preliminary    12/06/2018 No growth at 2 days  Preliminary     Urine Culture   Date Value Ref Range Status   12/06/2018 No growth  Final              Pain Management Panel     Pain Management Panel Latest Ref Rng & Units 10/16/2018 9/12/2018    CREATININE UR mg/dL 208.8 -    AMPHETAMINES SCREEN, URINE Negative - Negative    BARBITURATES SCREEN Negative - Negative    BENZODIAZEPINE SCREEN, URINE Negative - Negative    BUPRENORPHINE Negative - Negative    COCAINE SCREEN, URINE Negative - Negative    METHADONE SCREEN, URINE Negative - Negative        I have personally reviewed the above laboratory results.   ---------------------------------------------------------------------------------------------------------------------  Imaging Results (last 7 days)     Procedure Component Value Units Date/Time    XR Chest PA & Lateral [[id number]] Collected:  12/09/18 1115     Updated:  12/09/18 1118    Narrative:       EXAMINATION: XR CHEST PA AND LATERAL-      CLINICAL INDICATION: r/o pneumonia; R50.9-Fever, unspecified          COMPARISON: 12/7/2018      TECHNIQUE: XR CHEST PA AND LATERAL-      FINDINGS:   PowerPort is stable in the superior vena cava   Atelectasis in the left lung base  Equivocal pneumonia in the right infrahilar region   No pneumothorax.   Bony and soft tissue structures are unremarkable.            Impression:       Right infrahilar consolidation     This report was finalized on 12/9/2018 11:16 AM by Dr. Ernesto Bell MD.       XR Chest PA & Lateral [[id number]] Collected:  12/07/18 1505     Updated:  12/07/18 1508    Narrative:       EXAMINATION: XR CHEST PA AND LATERAL-      CLINICAL INDICATION: fever, cough          COMPARISON: 12/6/2018      TECHNIQUE: XR CHEST PA AND LATERAL-      FINDINGS:   PowerPort stable in the superior vena cava   Heart and mediastinal contours are unremarkable.   No pneumothorax.   Bony and soft tissue structures are unremarkable.            Impression:       No radiographic evidence of acute  cardiac or pulmonary disease.     This report was finalized on 12/7/2018 3:06 PM by Dr. Ernesto Bell MD.       XR Chest 1 View [[id number]] Collected:  12/07/18 0737     Updated:  12/07/18 1009    Narrative:       XR CHEST 1 VIEW-     CLINICAL INDICATION: fever          COMPARISON: 10/30/2018      TECHNIQUE: Single frontal view of the chest.     FINDINGS:     Left basilar atelectasis.  The cardiac silhouette is normal. The pulmonary vasculature is  unremarkable.  There is no evidence of an acute osseous abnormality.   There are no suspicious-appearing parenchymal soft tissue nodules.            Impression:       Left basilar atelectasis.         This report was finalized on 12/7/2018 10:07 AM by Dr. Ernesto Bell MD.       XR Abdomen Flat & Upright [[id number]] Collected:  12/07/18 0737     Updated:  12/07/18 1002    Narrative:       XR ABDOMEN FLAT AND UPRIGHT-     CLINICAL INDICATION: constipation          COMPARISON: None available.      FINDINGS:  Chest:  The included view of the chest demonstrates the lungs to be well  aerated. There is no focal alveolar infiltrate or pleural effusion. The  cardiac silhouette is normal. No acute osseous abnormality is seen  within the chest.     Abdomen:  Two views of the abdomen show no free air. I do not see abnormal bowel  distention to suggest complete obstruction. The bony structures are  intact. No abnormal soft tissue masses are seen. No suspicious appearing  calcifications are identified on today's exam.       Impression:       1. Moderate stool in the colon.  2. The lungs are clear.  3. No evidence of bowel obstruction.  4. No free air.               This report was finalized on 12/7/2018 10:00 AM by Dr. Ernesto Bell MD.       CT Head Without Contrast [[id number]] Collected:  12/07/18 0737     Updated:  12/07/18 0950    Narrative:       CT HEAD WITHOUT CONTRAST-     CLINICAL INDICATION: confusion          COMPARISON: 06/20/2017      TECHNIQUE: Axial images of the brain  were obtained without intravenous  contrast.  Reformatted images were created in the sagittal and coronal  planes.     DOSE: 746.31 mGy.cm     Radiation dose reduction techniques were utilized per ALARA protocol.  Automated exposure control was initiated through either or Mems-ID or  Stylect software packages by  protocol.           FINDINGS:   Today's study shows no mass, hemorrhage, or midline shift.   The ventricles, cisterns, and sulci are unremarkable. There is no  hydrocephalus.   There is no evidence of acute ischemia.  I do not see epidural or subdural hematoma.  The gray-white differentiation is appropriate.   The bone window setting images show no destructive calvarial lesion or  acute calvarial fracture.   The posterior fossa is unremarkable.             Impression:       No acute intracranial pathology. Nothing is seen on this exam to  specifically account for the patient's symptoms.     This report was finalized on 12/7/2018 9:48 AM by Dr. Ernesto Bell MD.           I have personally reviewed the above radiology results.   ---------------------------------------------------------------------------------------------------------------------      Assessment & Plan        Assessment/Plan       ASSESSMENT:    1. Severe sepsis with lactic acid greater than 2 on admission  2. Fever of unknown origin  3. Immunosupression      PLAN:    Patient presents with acute onset fever and worsening weakness. CRP improving at 1.05. WBC normal. Mycoplasma negative. Legionella negative. Influenza negative. Urine culture from 12/6/18 finalized as no growth. Blood cultures from 12/6/18 show no growth at this time. Lactic acid 2.0, improved from 2.5 on admission.   Chest x-ray from 12/9/18 reveals right infrahilar consolidation. KUB from 12/7/18 unremarkable.     Patient had a prior episode of bacteremia and is at very high risk for relapsing bacteremia.     In the setting of risk of relapsing bacteremia  vancomycin pharmacy to dose, Cefepime 2gm IV Q12H , and Micafungin 100 mg IV q 24 hrs was initiated.    We recommend to continue current regimen for now due to patient's immunosuppresion. Will follow closely and adjust antibiotic therapy as needed. CRP in AM.    Current Antimicrobials:  Vancomycin pharmacy to dose  Cefepime 2gm IV Q12H  Micafungin 100mg IV Q24H      Code Status:   Code Status and Medical Interventions:   Ordered at: 12/07/18 0635     Code Status:    CPR     Medical Interventions (Level of Support Prior to Arrest):    Full           VIMAL Wakefield  12/09/18  12:50 PM

## 2018-12-10 ENCOUNTER — DOCUMENTATION (OUTPATIENT)
Dept: OTHER | Facility: HOSPITAL | Age: 68
End: 2018-12-10

## 2018-12-10 LAB
ANION GAP SERPL CALCULATED.3IONS-SCNC: 7 MMOL/L (ref 3.6–11.2)
BASOPHILS # BLD AUTO: 0.04 10*3/MM3 (ref 0–0.3)
BASOPHILS NFR BLD AUTO: 0.5 % (ref 0–2)
BUN BLD-MCNC: 32 MG/DL (ref 7–21)
BUN/CREAT SERPL: 26.2 (ref 7–25)
CALCIUM SPEC-SCNC: 8.7 MG/DL (ref 7.7–10)
CHLORIDE SERPL-SCNC: 101 MMOL/L (ref 99–112)
CO2 SERPL-SCNC: 27 MMOL/L (ref 24.3–31.9)
CREAT BLD-MCNC: 1.22 MG/DL (ref 0.43–1.29)
CRP SERPL-MCNC: 0.67 MG/DL (ref 0–0.99)
DEPRECATED RDW RBC AUTO: 52.4 FL (ref 37–54)
EOSINOPHIL # BLD AUTO: 0.29 10*3/MM3 (ref 0–0.7)
EOSINOPHIL NFR BLD AUTO: 3.5 % (ref 0–7)
ERYTHROCYTE [DISTWIDTH] IN BLOOD BY AUTOMATED COUNT: 16.3 % (ref 11.5–14.5)
GFR SERPL CREATININE-BSD FRML MDRD: 59 ML/MIN/1.73
GLUCOSE BLD-MCNC: 134 MG/DL (ref 70–110)
GLUCOSE BLDC GLUCOMTR-MCNC: 139 MG/DL (ref 70–130)
GLUCOSE BLDC GLUCOMTR-MCNC: 141 MG/DL (ref 70–130)
GLUCOSE BLDC GLUCOMTR-MCNC: 145 MG/DL (ref 70–130)
GLUCOSE BLDC GLUCOMTR-MCNC: 156 MG/DL (ref 70–130)
HCT VFR BLD AUTO: 33.4 % (ref 42–52)
HGB BLD-MCNC: 10.5 G/DL (ref 14–18)
IMM GRANULOCYTES # BLD: 0.24 10*3/MM3 (ref 0–0.03)
IMM GRANULOCYTES NFR BLD: 2.9 % (ref 0–0.5)
LYMPHOCYTES # BLD AUTO: 1.43 10*3/MM3 (ref 1–3)
LYMPHOCYTES NFR BLD AUTO: 17.5 % (ref 16–46)
MCH RBC QN AUTO: 29.1 PG (ref 27–33)
MCHC RBC AUTO-ENTMCNC: 31.4 G/DL (ref 33–37)
MCV RBC AUTO: 92.5 FL (ref 80–94)
MONOCYTES # BLD AUTO: 1.37 10*3/MM3 (ref 0.1–0.9)
MONOCYTES NFR BLD AUTO: 16.7 % (ref 0–12)
NEUTROPHILS # BLD AUTO: 4.81 10*3/MM3 (ref 1.4–6.5)
NEUTROPHILS NFR BLD AUTO: 58.9 % (ref 40–75)
OSMOLALITY SERPL CALC.SUM OF ELEC: 279 MOSM/KG (ref 273–305)
PLATELET # BLD AUTO: 217 10*3/MM3 (ref 130–400)
PMV BLD AUTO: 10.5 FL (ref 6–10)
POTASSIUM BLD-SCNC: 4.4 MMOL/L (ref 3.5–5.3)
RBC # BLD AUTO: 3.61 10*6/MM3 (ref 4.7–6.1)
SODIUM BLD-SCNC: 135 MMOL/L (ref 135–153)
WBC NRBC COR # BLD: 8.18 10*3/MM3 (ref 4.5–12.5)

## 2018-12-10 PROCEDURE — 80048 BASIC METABOLIC PNL TOTAL CA: CPT | Performed by: HOSPITALIST

## 2018-12-10 PROCEDURE — 25010000003 MICAFUNGIN SODIUM 100 MG RECONSTITUTED SOLUTION 1 EACH VIAL: Performed by: PHYSICIAN ASSISTANT

## 2018-12-10 PROCEDURE — 97116 GAIT TRAINING THERAPY: CPT

## 2018-12-10 PROCEDURE — 85025 COMPLETE CBC W/AUTO DIFF WBC: CPT | Performed by: HOSPITALIST

## 2018-12-10 PROCEDURE — 25010000002 CEFEPIME 2 G/NS 100 ML SOLUTION: Performed by: INTERNAL MEDICINE

## 2018-12-10 PROCEDURE — 82962 GLUCOSE BLOOD TEST: CPT

## 2018-12-10 PROCEDURE — G8979 MOBILITY GOAL STATUS: HCPCS

## 2018-12-10 PROCEDURE — 63710000001 INSULIN ASPART PER 5 UNITS

## 2018-12-10 PROCEDURE — G8987 SELF CARE CURRENT STATUS: HCPCS

## 2018-12-10 PROCEDURE — 94799 UNLISTED PULMONARY SVC/PX: CPT

## 2018-12-10 PROCEDURE — 97167 OT EVAL HIGH COMPLEX 60 MIN: CPT

## 2018-12-10 PROCEDURE — 25010000002 ONDANSETRON PER 1 MG: Performed by: INTERNAL MEDICINE

## 2018-12-10 PROCEDURE — 25010000002 PROMETHAZINE PER 50 MG: Performed by: NURSE PRACTITIONER

## 2018-12-10 PROCEDURE — 25010000002 VANCOMYCIN 5 G RECONSTITUTED SOLUTION 5,000 MG VIAL

## 2018-12-10 PROCEDURE — 86301 IMMUNOASSAY TUMOR CA 19-9: CPT | Performed by: INTERNAL MEDICINE

## 2018-12-10 PROCEDURE — 97163 PT EVAL HIGH COMPLEX 45 MIN: CPT

## 2018-12-10 PROCEDURE — 63710000001 DRONABINOL PER 2.5 MG: Performed by: INTERNAL MEDICINE

## 2018-12-10 PROCEDURE — 99233 SBSQ HOSP IP/OBS HIGH 50: CPT | Performed by: INTERNAL MEDICINE

## 2018-12-10 PROCEDURE — G8988 SELF CARE GOAL STATUS: HCPCS

## 2018-12-10 PROCEDURE — G8978 MOBILITY CURRENT STATUS: HCPCS

## 2018-12-10 PROCEDURE — 86140 C-REACTIVE PROTEIN: CPT | Performed by: NURSE PRACTITIONER

## 2018-12-10 RX ORDER — SODIUM CHLORIDE 9 MG/ML
INJECTION, SOLUTION INTRAVENOUS
Status: COMPLETED
Start: 2018-12-10 | End: 2018-12-10

## 2018-12-10 RX ADMIN — CHOLECALCIFEROL TAB 10 MCG (400 UNIT) 1000 UNITS: 10 TAB at 08:12

## 2018-12-10 RX ADMIN — I.V. FAT EMULSION 50 G: 20 EMULSION INTRAVENOUS at 17:55

## 2018-12-10 RX ADMIN — ALBUTEROL SULFATE 2.5 MG: 2.5 SOLUTION RESPIRATORY (INHALATION) at 22:24

## 2018-12-10 RX ADMIN — FLUOXETINE 20 MG: 20 CAPSULE ORAL at 19:41

## 2018-12-10 RX ADMIN — APIXABAN 5 MG: 5 TABLET, FILM COATED ORAL at 08:12

## 2018-12-10 RX ADMIN — CEFEPIME 2 G: 2 INJECTION, POWDER, FOR SOLUTION INTRAVENOUS at 06:50

## 2018-12-10 RX ADMIN — PANTOPRAZOLE SODIUM 40 MG: 40 TABLET, DELAYED RELEASE ORAL at 06:50

## 2018-12-10 RX ADMIN — DRONABINOL 5 MG: 2.5 CAPSULE ORAL at 06:50

## 2018-12-10 RX ADMIN — ALBUTEROL SULFATE 2.5 MG: 2.5 SOLUTION RESPIRATORY (INHALATION) at 01:06

## 2018-12-10 RX ADMIN — FLECAINIDE ACETATE 50 MG: 50 TABLET ORAL at 19:41

## 2018-12-10 RX ADMIN — ONDANSETRON 4 MG: 4 TABLET, FILM COATED ORAL at 06:57

## 2018-12-10 RX ADMIN — COLCHICINE 0.6 MG: 0.6 TABLET, FILM COATED ORAL at 08:12

## 2018-12-10 RX ADMIN — ONDANSETRON 4 MG: 4 TABLET, FILM COATED ORAL at 23:09

## 2018-12-10 RX ADMIN — ALBUTEROL SULFATE 2.5 MG: 2.5 SOLUTION RESPIRATORY (INHALATION) at 13:25

## 2018-12-10 RX ADMIN — FLECAINIDE ACETATE 50 MG: 50 TABLET ORAL at 08:12

## 2018-12-10 RX ADMIN — ONDANSETRON 4 MG: 4 TABLET, FILM COATED ORAL at 13:53

## 2018-12-10 RX ADMIN — ONDANSETRON 4 MG: 2 INJECTION, SOLUTION INTRAMUSCULAR; INTRAVENOUS at 19:40

## 2018-12-10 RX ADMIN — VANCOMYCIN HYDROCHLORIDE 1500 MG: 5 INJECTION, POWDER, LYOPHILIZED, FOR SOLUTION INTRAVENOUS at 10:51

## 2018-12-10 RX ADMIN — ALLOPURINOL 100 MG: 100 TABLET ORAL at 08:12

## 2018-12-10 RX ADMIN — CEFEPIME 2 G: 2 INJECTION, POWDER, FOR SOLUTION INTRAVENOUS at 00:30

## 2018-12-10 RX ADMIN — CEFEPIME 2 G: 2 INJECTION, POWDER, FOR SOLUTION INTRAVENOUS at 22:53

## 2018-12-10 RX ADMIN — LEUCINE, PHENYLALANINE, LYSINE, METHIONINE, ISOLEUCINE, VALINE, HISTIDINE, THREONINE, TRYPTOPHAN, ALANINE, GLYCINE, ARGININE, PROLINE, SERINE, TYROSINE, SODIUM ACETATE, DIBASIC POTASSIUM PHOSPHATE, MAGNESIUM CHLORIDE, SODIUM CHLORIDE, CALCIUM CHLORIDE, DEXTROSE
365; 280; 290; 200; 300; 290; 240; 210; 90; 1035; 515; 575; 340; 250; 20; 340; 261; 51; 59; 33; 20 INJECTION INTRAVENOUS at 18:47

## 2018-12-10 RX ADMIN — CEFEPIME 2 G: 2 INJECTION, POWDER, FOR SOLUTION INTRAVENOUS at 14:33

## 2018-12-10 RX ADMIN — MONTELUKAST SODIUM 10 MG: 10 TABLET, COATED ORAL at 08:12

## 2018-12-10 RX ADMIN — SODIUM CHLORIDE, PRESERVATIVE FREE 3 ML: 5 INJECTION INTRAVENOUS at 19:42

## 2018-12-10 RX ADMIN — PROMETHAZINE HYDROCHLORIDE 12.5 MG: 25 INJECTION INTRAMUSCULAR; INTRAVENOUS at 08:12

## 2018-12-10 RX ADMIN — MICAFUNGIN SODIUM 100 MG: 20 INJECTION, POWDER, LYOPHILIZED, FOR SOLUTION INTRAVENOUS at 17:57

## 2018-12-10 RX ADMIN — FLUOXETINE 20 MG: 20 CAPSULE ORAL at 08:12

## 2018-12-10 RX ADMIN — MORPHINE SULFATE 30 MG: 30 TABLET, EXTENDED RELEASE ORAL at 08:40

## 2018-12-10 RX ADMIN — APIXABAN 5 MG: 5 TABLET, FILM COATED ORAL at 19:41

## 2018-12-10 RX ADMIN — SODIUM CHLORIDE 50 ML: 9 INJECTION, SOLUTION INTRAVENOUS at 08:14

## 2018-12-10 RX ADMIN — LANSOPRAZOLE 30 MG: KIT at 06:50

## 2018-12-10 RX ADMIN — ASCORBIC ACID, VITAMIN A PALMITATE, CHOLECALCIFEROL, THIAMINE HYDROCHLORIDE, RIBOFLAVIN-5 PHOSPHATE SODIUM, PYRIDOXINE HYDROCHLORIDE, NIACINAMIDE, DEXPANTHENOL, ALPHA-TOCOPHEROL ACETATE, VITAMIN K1, FOLIC ACID, BIOTIN, CYANOCOBALAMIN: 200; 3300; 200; 6; 3.6; 6; 40; 15; 10; 150; 600; 60; 5 INJECTION, SOLUTION INTRAVENOUS at 12:02

## 2018-12-10 RX ADMIN — MORPHINE SULFATE 30 MG: 30 TABLET, EXTENDED RELEASE ORAL at 19:41

## 2018-12-10 RX ADMIN — PROMETHAZINE HYDROCHLORIDE 12.5 MG: 25 INJECTION INTRAMUSCULAR; INTRAVENOUS at 15:14

## 2018-12-10 RX ADMIN — INSULIN ASPART 2 UNITS: 100 INJECTION, SOLUTION INTRAVENOUS; SUBCUTANEOUS at 12:01

## 2018-12-10 RX ADMIN — SODIUM CHLORIDE 50 ML: 9 INJECTION, SOLUTION INTRAVENOUS at 15:14

## 2018-12-10 RX ADMIN — ALBUTEROL SULFATE 2.5 MG: 2.5 SOLUTION RESPIRATORY (INHALATION) at 06:44

## 2018-12-10 RX ADMIN — DRONABINOL 5 MG: 2.5 CAPSULE ORAL at 17:56

## 2018-12-10 NOTE — THERAPY EVALUATION
Acute Care - Occupational Therapy Initial Evaluation   Jameel     Patient Name: Todd Phillips  : 1950  MRN: [id number]  Today's Date: 12/10/2018  Onset of Illness/Injury or Date of Surgery: 18(Admit Date)     Referring Physician: José Miguel    Admit Date: 2018       ICD-10-CM ICD-9-CM   1. Fever, unspecified fever cause R50.9 780.60     Patient Active Problem List   Diagnosis   • Hyperlipidemia   • COPD (chronic obstructive pulmonary disease) (CMS/HCC)   • Essential hypertension   • Gout without tophus   • Anxiety and depression   • Primary insomnia   • Gastroesophageal reflux disease without esophagitis   • Nonobstructive atherosclerosis of coronary artery   • CKD stage 3 secondary to diabetes (CMS/HCC)   • DM2 (diabetes mellitus, type 2) (CMS/HCC)   • Paroxysmal atrial fibrillation   • Chronic anticoagulation, eliquis   • Encounter for monitoring flecainide therapy   • Malignant neoplasm of head of pancreas (CMS/HCC)   • Bacteremia due to Escherichia coli   • Biliary obstruction   • Thrombocytopenia (CMS/HCC)   • Anemia due to chemotherapy   • Fever     Past Medical History:   Diagnosis Date   • Antral gastritis    • Asthma    • Atrial fibrillation (CMS/HCC)     treated with oral blood thinner   • Chest pain    • COPD (chronic obstructive pulmonary disease) (CMS/HCC)    • Coronary artery disease     no stents   • Diabetes mellitus (CMS/HCC)     type 2    • Duodenitis    • Elevated cholesterol    • Emphysema of lung (CMS/HCC)    • Gallbladder disease     removed    • GERD (gastroesophageal reflux disease)    • Hyperlipidemia    • Hypertension    • Jaundice    • Kidney stone    • Kidney stones     had lithotripsy and passed 36 kidney stones as well as had one surgically removed.   • Malignant neoplasm of head of pancreas (CMS/HCC) 2018   • Palpitations    • Pneumonia 2018   • Reflux esophagitis    • Renal failure     stage 3 kidney disease   • Sepsis (CMS/HCC)      Past Surgical History:    Procedure Laterality Date   • BILE DUCT STENT PLACEMENT      [facility] 2 weeks ago    • CARDIAC CATHETERIZATION  11/01/1999   • CARDIOVASCULAR STRESS TEST  09/2012   • COLONOSCOPY     • CYSTOSCOPY BLADDER STONE LITHOTRIPSY     • ECHO - CONVERTED  09/2012   • KIDNEY STONE SURGERY     • KIDNEY STONE SURGERY      open abdominal surgery   • UPPER GASTROINTESTINAL ENDOSCOPY  08/30/2012          OT ASSESSMENT FLOWSHEET (last 72 hours)      Occupational Therapy Evaluation     Row Name 12/10/18 1245                   OT Evaluation Time/Intention    Subjective Information  complains of;nausea/vomiting  -        Document Type  evaluation  -        Mode of Treatment  occupational therapy;individual therapy  -        Patient Effort  adequate  -        Comment  Nursing and pt agreeable to OT eval  -           General Information    Patient Profile Reviewed?  yes  -        Onset of Illness/Injury or Date of Surgery  12/06/18 Admit Date  -        Referring Physician  José Miguel  -        Patient Observations  alert;cooperative;agree to therapy  -        Patient/Family Observations  Family in room and supportive  -        General Observations of Patient  Pt supine and agreeable to OT eval  -KH        Prior Level of Function  min assist:;mod assist:;ADL's  -        Equipment Currently Used at Home  shower chair;walker, rolling;bipap/ cpap;nebulizer  -        Existing Precautions/Restrictions  fall  -        Equipment Ordered for Patient  -- TBD  -        Risks Reviewed  patient and family:;LOB;nausea/vomiting;dizziness;increased discomfort;change in vital signs;increased drainage;lines disloged  -        Benefits Reviewed  patient and family:;improve function;increase strength;increase independence;increase balance;decrease pain;decrease risk of DVT;improve skin integrity;increase knowledge  -        Barriers to Rehab  medically complex;previous functional deficit  -            Relationship/Environment    Primary Source of Support/Comfort  spouse  -KH        Name of Support/Comfort Primary Source  Wife: Emmy DESIR        Lives With  spouse  -        Name(s) of Who Lives With Patient  Wife: Emmy DESIR           Resource/Environmental Concerns    Current Living Arrangements  home/apartment/condo  -        Resource/Environmental Concerns  none  -KH           Home Main Entrance    Number of Stairs, Main Entrance  four  -KH        Stair Railings, Main Entrance  railings safe and in good condition;railings on both sides of stairs  -KH           Cognitive Assessment/Intervention- PT/OT    Orientation Status (Cognition)  oriented x 3  -KH        Follows Commands (Cognition)  follows one step commands;WFL  -KH           ADL Assessment/Intervention    BADL Assessment/Intervention  bathing;upper body dressing;lower body dressing;grooming;feeding;toileting  -KH           Bathing Assessment/Intervention    Bathing [place] Level  bathing skills  -KH        Comment (Bathing)  Mod A  -KH           Upper Body Dressing Assessment/Training    Upper Body Dressing [place] Level  upper body dressing skills  -KH        Comment (Upper Body Dressing)  Min A  -KH           Lower Body Dressing Assessment/Training    Lower Body Dressing [place] Level  lower body dressing skills  -KH        Comment (Lower Body Dressing)  Mod A  -KH           Grooming Assessment/Training    [place] Level (Grooming)  grooming skills  -KH        Comment (Grooming)  Set Up  -KH           Self-Feeding Assessment/Training    [place] Level (Feeding)  feeding skills  -KH        Comment (Feeding)  Mod I  -KH           Toileting Assessment/Training    [place] Level (Toileting)  toileting skills  -KH        Comment (Toileting)  Max A  -KH           General ROM    GENERAL ROM COMMENTS  BUE WFL  -KH           MMT (Manual Muscle Testing)    General MMT Comments  BUE 3-/5  -KH           Positioning and Restraints     Pre-Treatment Position  in bed  -        Post Treatment Position  bed  -        In Bed  notified nsg;supine;call light within reach;encouraged to call for assist;with family/caregiver;side rails up x2  -           Plan of Care Review    Plan of Care Reviewed With  patient  -           Clinical Impression (OT)    OT Diagnosis  Debility  -        Patient/Family Goals Statement (OT Eval)  To return home safely  -        Therapy Frequency (OT Eval)  3 times/wk 3-5 x per week  -        Anticipated Equipment Needs at Discharge (OT)  -- TBD  -           Planned OT Interventions    Planned Therapy Interventions (OT Eval)  activity tolerance training;adaptive equipment training;BADL retraining;functional balance retraining;occupation/activity based interventions;patient/caregiver education/training;ROM/therapeutic exercise;strengthening exercise;transfer/mobility retraining  -           OT Goals    Toileting Goal Selection (OT)  toileting, OT goal 1  -        Strength Goal Selection (OT)  strength, OT goal 1  -        Additional Documentation  Strength Goal Selection (OT) (Row)  -           Toileting Goal 1 (OT)    Activity/Device (Toileting Goal 1, OT)  toileting skills, all  -        [place] Level/Cues Needed (Toileting Goal 1, OT)  minimum assist (75% or more patient effort)  -        Time Frame (Toileting Goal 1, OT)  by discharge  -           Strength Goal 1 (OT)    Strength Goal 1 (OT)  Pt will increase BUE strength x 1 to increase ability to safely participate in self care tasks and functional transfers w/ increased independence.   -        Time Frame (Strength Goal 1, OT)  by discharge  -           Living Environment    Home Accessibility  stairs to enter home;tub/shower is not walk in  -          User Key  (r) = Recorded By, (t) = Taken By, (c) = Cosigned By    Initials Name Effective Dates    Tabatha Cantor, OT 04/17/18 -          Occupational Therapy Education      Title: PT OT SLP Therapies (Done)     Topic: Occupational Therapy (Done)     Point: ADL training (Done)     Description: Instruct learner(s) on proper safety adaptation and remediation techniques during self care or transfers.   Instruct in proper use of assistive devices.    Learning Progress Summary           Patient Acceptance, E,TB, VU by ML at 12/8/2018  9:43 AM    Acceptance, E,TB, VU by AM at 12/7/2018  9:53 AM                   Point: Home exercise program (Done)     Description: Instruct learner(s) on appropriate technique for monitoring, assisting and/or progressing therapeutic exercises/activities.    Learning Progress Summary           Patient Acceptance, E,TB, VU by ML at 12/8/2018  9:43 AM    Acceptance, E,TB, VU by AM at 12/7/2018  9:53 AM                   Point: Precautions (Done)     Description: Instruct learner(s) on prescribed precautions during self-care and functional transfers.    Learning Progress Summary           Patient Acceptance, E,TB, VU by ML at 12/8/2018  9:43 AM    Acceptance, E,TB, VU by AM at 12/7/2018  9:53 AM                   Point: Body mechanics (Done)     Description: Instruct learner(s) on proper positioning and spine alignment during self-care, functional mobility activities and/or exercises.    Learning Progress Summary           Patient Acceptance, E,TB, VU by ML at 12/8/2018  9:43 AM    Acceptance, E,TB, VU by AM at 12/7/2018  9:53 AM                               User Key     Initials Effective Dates Name Provider Type Discipline    AM 06/11/18 -  Aletha Hargrove, RN Registered Nurse Nurse     07/25/18 -  Mable Cooper RN Registered Nurse Nurse                  OT Recommendation and Plan  Outcome Summary/Treatment Plan (OT)  Anticipated Equipment Needs at Discharge (OT): (TBD)  Planned Therapy Interventions (OT Eval): activity tolerance training, adaptive equipment training, BADL retraining, functional balance retraining, occupation/activity based interventions,  patient/caregiver education/training, ROM/therapeutic exercise, strengthening exercise, transfer/mobility retraining  Therapy Frequency (OT Eval): 3 times/wk(3-5 x per week)  Plan of Care Review  Plan of Care Reviewed With: patient  Plan of Care Reviewed With: patient    Outcome Measures     Row Name 12/10/18 1519             How much help from another is currently needed...    Putting on and taking off regular lower body clothing?  2  -KH      Bathing (including washing, rinsing, and drying)  2  -KH      Toileting (which includes using toilet bed pan or urinal)  2  -KH      Putting on and taking off regular upper body clothing  3  -KH      Taking care of personal grooming (such as brushing teeth)  3  -KH      Eating meals  3  -KH      Score  15  -KH         Functional Assessment    Outcome Measure Options  AM-PAC 6 Clicks Daily Activity (OT)  -        User Key  (r) = Recorded By, (t) = Taken By, (c) = Cosigned By    Initials Name Provider Type    Tabatha Cantor OT Occupational Therapist          Time Calculation:   Time Calculation- OT     Row Name 12/10/18 1520             Time Calculation- OT    Total Timed Code Minutes- OT  24 minute(s)  -        User Key  (r) = Recorded By, (t) = Taken By, (c) = Cosigned By    Initials Name Provider Type    Tabatha Cantor OT Occupational Therapist        Therapy Suggested Charges     Code   Minutes Charges    None           Therapy Charges for Today     Code Description Service Date Service Provider Modifiers Qty    [id number]  OT SELFCARE CURRENT 12/10/2018 Tabatha Myles OT GO CK 1    [id number] HC OT SELFCARE PROJECTED 12/10/2018 Tabatha Myles OT GO CJ 1    [id number]  OT EVAL HIGH COMPLEXITY 2 12/10/2018 Tabatha Myles OT GO 1          OT G-codes  OT Professional Judgement Used?: Yes  OT Functional Scales Options: AM-PAC 6 Clicks Daily Activity (OT)  Functional Assessment Tool Used:  FIM  Functional Limitation: Self care  Self Care Current Status (): At least 40 percent but less than 60 percent impaired, limited or restricted  Self Care Goal Status (): At least 20 percent but less than 40 percent impaired, limited or restricted    Tabatha Myles OT  12/10/2018

## 2018-12-10 NOTE — PLAN OF CARE
Problem: Patient Care Overview  Goal: Plan of Care Review   12/10/18 0425   Coping/Psychosocial   Plan of Care Reviewed With patient   Plan of Care Review   Progress no change

## 2018-12-10 NOTE — PROGRESS NOTES
Discharge Planning Assessment   Jameel     Patient Name: Todd Phillips  MRN: [id number]  Today's Date: 12/10/2018    Admit Date: 12/6/2018      Discharge Plan     Row Name 12/10/18 1215       Plan    Plan  Pt lives at home with his spouse and he plans to return home at discharge. Pt utilizes [facility]. [facility] [phone] to be contacted at discharge. Pt receives TPN at home from [facility]. Pt has a rolling walker and shower chair at home. PCP is Dr. Denney. SS to follow.         Mercedes Castellanos

## 2018-12-10 NOTE — PROGRESS NOTES
I called patient's spouse to check on patient and saw where he is in-patient at [facility]. I did notify Aletha at [facility].

## 2018-12-10 NOTE — PROGRESS NOTES
PROGRESS NOTE         Patient Identification:  Name:  Todd Phillips  Age:  68 y.o.  Sex:  male  :  1950  MRN:  [id number]  Visit Number:  [id number]  Primary Care Provider:  Adiel Denney MD      ----------------------------------------------------------------------------------------------------------------------  Subjective       Chief Complaints:    Chest Pain and Fever        Interval History:      Patient more comfortable this morning. Improved nausea and abdominal pain. No fever or diarrhea. WBC normal. CRP improving at 0.67. Blood cultures from 18 show no growth at this time.    Review of Systems:    Constitutional: no fever, chills and night sweats. No appetite change or unexpected weight change. No fatigue.  Eyes: no eye drainage, itching or redness.  HEENT: no mouth sores, dysphagia or nose bleed.  Respiratory: no for shortness of breath, cough or production of sputum.  Cardiovascular: no chest pain, no palpitations, no orthopnea.  Gastrointestinal: Positive nausea, no vomiting or diarrhea. Positive abdominal pain, no hematemesis or rectal bleeding.  Genitourinary: no dysuria or polyuria.  Hematologic/lymphatic: no lymph node abnormalities, no easy bruising or easy bleeding.  Musculoskeletal: no muscle or joint pain.  Skin: No rash and no itching.  Neurological: no loss of consciousness, no seizure, no headache.  Behavioral/Psych: no depression or suicidal ideation.  Endocrine: no hot flashes.  Immunologic: negative.    ----------------------------------------------------------------------------------------------------------------------      Objective       Current Hospital Meds:    allopurinol 100 mg Oral Daily   apixaban 5 mg Oral Q12H   cefepime 2 g Intravenous Q8H   cholecalciferol 1,000 Units Oral Daily   colchicine 0.6 mg Oral Daily   dronabinol 5 mg Oral BID AC   fat emulsion 250 mL Intravenous Once per day on    And      trace minerals + multivitamin IVPB   Intravenous Once per day on Mon Wed Fri   flecainide 50 mg Oral Q12H   FLUoxetine 20 mg Oral BID   insulin aspart 0-7 Units Subcutaneous Q6H   lansoprazole 30 mg Oral BID AC   linaclotide 145 mcg Oral QAM AC   micafungin (MYCAMINE)  mg Intravenous Q24H   mometasone 2 puff Inhalation Nightly   montelukast 10 mg Oral Daily   Morphine 30 mg Oral Q12H   pantoprazole 40 mg Oral QAM   sodium chloride 3 mL Intravenous Q12H   tiotropium bromide-olodaterol 2 puff Inhalation Daily   vancomycin 1,500 mg Intravenous Q18H       Adult [facility] TPN  Last Rate: 80 mL/hr at 12/09/18 1715   Pharmacy Consult       ----------------------------------------------------------------------------------------------------------------------    Vital Signs:  Temp:  [97.5 °F (36.4 °C)-99.1 °F (37.3 °C)] 97.5 °F (36.4 °C)  Heart Rate:  [73-87] 86  Resp:  [18-20] 20  BP: (128-169)/(75-95) 128/82  No data found.  SpO2 Percentage    12/10/18 0644 12/10/18 0653 12/10/18 1040   SpO2: 95% 95% 98%     SpO2:  [93 %-98 %] 98 %  on   ;   Device (Oxygen Therapy): room air    Body mass index is 26.73 kg/m².  Wt Readings from Last 3 Encounters:   12/10/18 89.4 kg (197 lb 2 oz)   12/04/18 88.4 kg (194 lb 12.8 oz)   11/27/18 90.7 kg (200 lb)        Intake/Output Summary (Last 24 hours) at 12/10/2018 1316  Last data filed at 12/10/2018 1245  Gross per 24 hour   Intake 660 ml   Output 1025 ml   Net -365 ml     Adult [facility] TPN  Diet Regular; Consistent Carbohydrate  ----------------------------------------------------------------------------------------------------------------------    Physical exam:      Constitutional:  Well-developed and well-nourished.  No respiratory distress.      HENT:  Head: Normocephalic and atraumatic.  Mouth:  Moist mucous membranes.    Eyes:  Conjunctivae and EOM are normal.  No scleral icterus.  Neck:  Neck supple.  No JVD present.    Cardiovascular:  Normal rate, regular rhythm and normal heart sounds with no  murmur. No edema.  Pulmonary/Chest:  No respiratory distress, no wheezes, no crackles, with normal breath sounds and good air movement.  Abdominal:  Soft.  Bowel sounds are normal.  No distension. Positive tenderness.  Musculoskeletal:  No edema, no tenderness, and no deformity.  No swelling or redness of joints.  Neurological:  Alert and oriented to person, place, and time.  No facial droop.  No slurred speech.   Skin:  Skin is warm and dry.  No rash noted.  No pallor.   Psychiatric:  Normal mood and affect.  Behavior is normal.    ----------------------------------------------------------------------------------------------------------------------  I have personally reviewed the EKG/Telemetry strips   ----------------------------------------------------------------------------------------------------------------------  Results from last 7 days   Lab Units  12/06/18   2305   TROPONIN I ng/mL  0.007       Results from last 7 days   Lab Units  12/07/18   0705   TRIGLYCERIDES mg/dL  127     Results from last 7 days   Lab Units  12/06/18   2319   PH, ARTERIAL pH units  7.530*   PO2 ART mm Hg  74.8*   PCO2, ARTERIAL mm Hg  29.7*   HCO3 ART mmol/L  24.3     Results from last 7 days   Lab Units  12/10/18   0453  12/09/18   0440  12/09/18   0412  12/08/18   0816  12/08/18   0325  12/07/18   1141  12/07/18   0705  12/06/18   2305   CRP mg/dL  0.67  1.05*   --    --    --    --   3.15*  2.00*   LACTATE mmol/L   --    --    --   2.0   --   2.1*  2.5*  1.3   WBC 10*3/mm3  8.18   --   7.62   --   14.56*   --   10.68  10.72   HEMOGLOBIN g/dL  10.5*   --   10.2*   --   11.1*   --   11.3*  11.3*   HEMATOCRIT %  33.4*   --   31.9*   --   34.3*   --   34.6*  33.9*   MCV fL  92.5   --   91.4   --   91.5   --   91.3  89.4   MCHC g/dL  31.4*   --   32.0*   --   32.4*   --   32.7*  33.3   PLATELETS 10*3/mm3  217   --   240   --   273   --   282  323   INR    --    --    --    --    --    --    --   1.56*     Results from last 7 days    Lab Units  12/10/18   0453  12/09/18   0412  12/08/18   0325  12/07/18   0705  12/06/18   2305  12/04/18   1149   SODIUM mmol/L  135  135  130*  131*  132*  136   POTASSIUM mmol/L  4.4  4.8  4.4  4.2  4.2  4.1   MAGNESIUM mg/dL   --   2.0  2.6  1.7   --    --    CHLORIDE mmol/L  101  103  100  102  99  100   CO2 mmol/L  27.0  23.3*  24.8  20.6*  24.0*  28.8   BUN mg/dL  32*  33*  30*  30*  33*  36*   CREATININE mg/dL  1.22  1.25  1.20  1.24  1.27  1.37*   EGFR IF NONAFRICN AM mL/min/1.73  59*  57*  60*  58*  56*  51*   CALCIUM mg/dL  8.7  8.8  9.1  8.6  9.1  9.8   GLUCOSE mg/dL  134*  127*  251*  251*  172*  128*   ALBUMIN g/dL   --   3.20*   --    --   3.80  4.20   BILIRUBIN mg/dL   --   0.2   --    --   0.4  0.3   ALK PHOS U/L   --   77   --    --   102  118   AST (SGOT) U/L   --   22   --    --   18  21   ALT (SGPT) U/L   --   10   --    --   18  16   Estimated Creatinine Clearance: 71.2 mL/min (by C-G formula based on SCr of 1.22 mg/dL).  No results found for: AMMONIA    Hemoglobin A1C   Date/Time Value Ref Range Status   12/08/2018 1513 7.00 (H) 4.50 - 5.70 % Final     Glucose   Date/Time Value Ref Range Status   12/10/2018 1057 156 (H) 70 - 130 mg/dL Final   12/10/2018 0726 139 (H) 70 - 130 mg/dL Final   12/09/2018 2227 133 (H) 70 - 130 mg/dL Final   12/09/2018 1642 160 (H) 70 - 130 mg/dL Final   12/09/2018 1225 141 (H) 70 - 130 mg/dL Final   12/09/2018 0601 142 (H) 70 - 130 mg/dL Final   12/08/2018 2320 176 (H) 70 - 130 mg/dL Final   12/08/2018 1630 164 (H) 70 - 130 mg/dL Final     Lab Results   Component Value Date    HGBA1C 7.00 (H) 12/08/2018     Lab Results   Component Value Date    TSH 1.502 11/18/2018    FREET4 1.14 10/30/2018       Blood Culture   Date Value Ref Range Status   12/06/2018 No growth at 3 days  Preliminary   12/06/2018 No growth at 3 days  Preliminary     Urine Culture   Date Value Ref Range Status   12/06/2018 No growth  Final              Pain Management Panel     Pain Management  Panel Latest Ref Rng & Units 10/16/2018 9/12/2018    CREATININE UR mg/dL 208.8 -    AMPHETAMINES SCREEN, URINE Negative - Negative    BARBITURATES SCREEN Negative - Negative    BENZODIAZEPINE SCREEN, URINE Negative - Negative    BUPRENORPHINE Negative - Negative    COCAINE SCREEN, URINE Negative - Negative    METHADONE SCREEN, URINE Negative - Negative          I have personally reviewed the above laboratory results.   ----------------------------------------------------------------------------------------------------------------------  Imaging Results (last 24 hours)     Procedure Component Value Units Date/Time    CT Chest Without Contrast [[id number]] Collected:  12/10/18 0728     Updated:  12/10/18 0732    Narrative:       EXAM: CT CHEST WO CONTRAST-              CLINICAL INDICATION:R/O pneumonia. XRAY with consolidation; R50.9-Fever,  unspecified      COMPARISON: COMPARISON: 9/11/2018     TECHNIQUE: Multiple axial CT images were obtained from lung apex through  upper abdomen WITHOUT administration of IV contrast. Reformatted images  in the coronal and/or sagittal plane(s) were generated from the axial  data set to facilitate diagnostic accuracy and/or surgical planning.  Oral Contrast:NONE.     Radiation dose reduction techniques were utilized per ALARA protocol.  Automated exposure control was initiated through either or [employer] or  DoseRight software packages by  protocol.    DOSE (DLP mGy-cm): 516.01 mGy.cm        FINDINGS:     LUNGS: CHRONIC APPEARING INTERSTITIAL LUNG CHANGES ARE NOTED WITH  UNDERLYING MILD CENTRILOBULAR EMPHYSEMA. SUBPLEURAL AND PERIPHERAL  FIBROSIS OF THE LOWER LUNG ZONES NOTED. NO AIRSPACE CONSOLIDATIONS OR  SUSPICIOUS PULMONARY NODULES/MASSES IDENTIFIED.     HEART: MODERATE CARDIOMEGALY WITH MODERATE CORONARY VASCULAR  CALCIFICATIONS.     PERICARDIUM: No effusion.     MEDIASTINUM: No masses. No enlarged lymph nodes.  No fluid collections.     PLEURA: No pleural effusion. No  pleural mass or abnormal calcification.  No pneumothorax.     MAJOR AIRWAYS: Clear. No intrinsic mass.     VASCULATURE: No evidence of aneurysm.     VISUALIZED UPPER ABDOMEN:SINCE THE PREVIOUS EXAM, THERE HAS BEEN  INTERVAL PLACEMENT OF BILIARY STENT WHICH LIKELY ACCOUNTS FOR PRESENCE  OF PNEUMOBILIA WITHIN THE LIVER.     OTHER: None.     BONES: DEGENERATIVE CHANGES THORACIC SPINE BUT NO ACUTE BONY FINDINGS.       Impression:       1. NO SIGNIFICANT CHANGE IN THE APPEARANCE OF THE CHEST AGAIN  DEMONSTRATING CHRONIC LUNG CHANGES WITH MILD BASILAR SUBPLEURAL  FIBROSIS.  2. STABLE CARDIOMEGALY.  3. INTERVAL PLACEMENT OF BILIARY STENT WHICH ACCOUNTS FOR PNEUMOBILIA.     This report was finalized on 12/10/2018 7:30 AM by Dr. Klever Paredes MD.           I have personally reviewed the above radiology results.   ----------------------------------------------------------------------------------------------------------------------    Assessment/Plan       Assessment/Plan     ASSESSMENT:    1. Severe sepsis with lactic acid greater than 2 on admission  2. Fever of unknown origin  3. Immunosupression    PLAN:    Patient more comfortable this morning. Improved nausea and abdominal pain. No fever or diarrhea. WBC normal. CRP improving at 0.67.     Mycoplasma negative. Legionella negative. Influenza negative. Urine culture from 12/6/18 finalized as no growth. Blood cultures from 12/6/18 show no growth at this time. Lactic acid 2.0, improved from 2.5 on admission.     Chest x-ray from 12/9/18 reveals right infrahilar consolidation. KUB from 12/7/18 unremarkable.      Patient had a prior episode of bacteremia and is at very high risk for relapsing bacteremia.     In the setting of risk of relapsing bacteremia vancomycin pharmacy to dose, Cefepime 2gm IV Q12H , and Micafungin 100 mg IV q 24 hrs was initiated.     We recommend to continue current regimen until discharge due to patient's immunosuppresion. Will follow closely and  adjust antibiotic therapy as needed. CRP in AM.     Current Antimicrobials:  Vancomycin pharmacy to dose  Cefepime 2gm IV Q12H  Micafungin 100mg IV Q24H        Code Status:   Code Status and Medical Interventions:   Ordered at: 12/07/18 0635     Code Status:    CPR     Medical Interventions (Level of Support Prior to Arrest):    Full       VIMAL Wakefield  12/10/18  1:16 PM

## 2018-12-10 NOTE — PLAN OF CARE
Problem: Skin Injury Risk (Adult)  Goal: Skin Health and Integrity  Outcome: Ongoing (interventions implemented as appropriate)   12/10/18 1334   Skin Injury Risk (Adult)   Skin Health and Integrity making progress toward outcome       Problem: Patient Care Overview  Goal: Plan of Care Review  Outcome: Ongoing (interventions implemented as appropriate)      Problem: Nutrition, Parenteral (Adult)  Goal: Signs and Symptoms of Listed Potential Problems Will be Absent, Minimized or Managed (Nutrition, Parenteral)  Outcome: Ongoing (interventions implemented as appropriate)      Problem: Cardiac Output Decreased (Adult)  Goal: Effective Tissue Perfusion  Outcome: Ongoing (interventions implemented as appropriate)      Problem: Patient Care Overview  Goal: Plan of Care Review  Outcome: Ongoing (interventions implemented as appropriate)   12/10/18 5354   Coping/Psychosocial   Plan of Care Reviewed With patient       Problem: Fall Risk (Adult)  Goal: Absence of Fall  Outcome: Ongoing (interventions implemented as appropriate)   12/10/18 7954   Fall Risk (Adult)   Absence of Fall making progress toward outcome

## 2018-12-10 NOTE — THERAPY EVALUATION
Acute Care - Physical Therapy Initial Evaluation   Jameel     Patient Name: Todd Phillips  : 1950  MRN: [id number]  Today's Date: 12/10/2018   Onset of Illness/Injury or Date of Surgery: 18  Date of Referral to PT: 18  Referring Physician: Dr. Valdez      Admit Date: 2018    Visit Dx:     ICD-10-CM ICD-9-CM   1. Fever, unspecified fever cause R50.9 780.60     Patient Active Problem List   Diagnosis   • Hyperlipidemia   • COPD (chronic obstructive pulmonary disease) (CMS/[facility])   • Essential hypertension   • Gout without tophus   • Anxiety and depression   • Primary insomnia   • Gastroesophageal reflux disease without esophagitis   • Nonobstructive atherosclerosis of coronary artery   • CKD stage 3 secondary to diabetes (CMS/[facility])   • DM2 (diabetes mellitus, type 2) (CMS/[facility])   • Paroxysmal atrial fibrillation   • Chronic anticoagulation, eliquis   • Encounter for monitoring flecainide therapy   • Malignant neoplasm of head of pancreas (CMS/[facility])   • Bacteremia due to Escherichia coli   • Biliary obstruction   • Thrombocytopenia (CMS/[facility])   • Anemia due to chemotherapy   • Fever     Past Medical History:   Diagnosis Date   • Antral gastritis    • Asthma    • Atrial fibrillation (CMS/[facility])     treated with oral blood thinner   • Chest pain    • COPD (chronic obstructive pulmonary disease) (CMS/[facility])    • Coronary artery disease     no stents   • Diabetes mellitus (CMS/[facility])     type 2    • Duodenitis    • Elevated cholesterol    • Emphysema of lung (CMS/[facility])    • Gallbladder disease     removed    • GERD (gastroesophageal reflux disease)    • Hyperlipidemia    • Hypertension    • Jaundice    • Kidney stone    • Kidney stones     had lithotripsy and passed 36 kidney stones as well as had one surgically removed.   • Malignant neoplasm of head of pancreas (CMS/HCC) 2018   • Palpitations    • Pneumonia 2018   • Reflux esophagitis    • Renal failure     stage 3 kidney disease   • Sepsis  (CMS/[facility])      Past Surgical History:   Procedure Laterality Date   • BILE DUCT STENT PLACEMENT      [facility] 2 weeks ago    • CARDIAC CATHETERIZATION  11/01/1999   • CARDIOVASCULAR STRESS TEST  09/2012   • COLONOSCOPY     • CYSTOSCOPY BLADDER STONE LITHOTRIPSY     • ECHO - CONVERTED  09/2012   • KIDNEY STONE SURGERY     • KIDNEY STONE SURGERY      open abdominal surgery   • UPPER GASTROINTESTINAL ENDOSCOPY  08/30/2012        PT ASSESSMENT (last 12 hours)      Physical Therapy Evaluation     Row Name 12/10/18 9527          PT Evaluation Time/Intention    Subjective Information  complains of;nausea/vomiting  -AG     Document Type  evaluation  -AG     Mode of Treatment  individual therapy;physical therapy  -AG     Patient Effort  good  -AG     Symptoms Noted During/After Treatment  other (see comments) increased nausea  -AG     Row Name 12/10/18 9675          General Information    Patient Profile Reviewed?  yes  -AG     Onset of Illness/Injury or Date of Surgery  12/06/18  -AG     Referring Physician  Dr. Valdez  -AG     Patient Observations  alert;cooperative;agree to therapy  -AG     Patient/Family Observations  pt. supine in bed; IV and TPN in place.  Spouse is at bedside.  Patient is awake and cooperative for participation.   -AG     Prior Level of Function  min assist:;all household mobility wife reports she has been holding on to him for gait; no a.d  -AG     Equipment Currently Used at Home  walker, rolling;wheelchair has transport chair, using for community access  -AG     Pertinent History of Current Functional Problem  pt. presents with incr COPD; pain and nausea associated with known pancreatic CA.   -AG     Existing Precautions/Restrictions  fall  -AG     Limitations/Impairments  safety/cognitive  -AG     Equipment Issued to Patient  gait belt  -AG     Equipment Ordered for Patient  -- TBD  -AG     Risks Reviewed  patient and family:;LOB;nausea/vomiting;dizziness;increased discomfort   -AG     Benefits Reviewed  patient and family:;improve function;increase independence;increase strength;increase balance;decrease risk of DVT;increase knowledge  -AG     Barriers to Rehab  medically complex  -AG     Row Name 12/10/18 1740          Relationship/Environment    Primary Source of Support/Comfort  spouse  -AG     Lives With  spouse  -AG     Row Name 12/10/18 1740          Resource/Environmental Concerns    Current Living Arrangements  home/apartment/condo  -AG     Row Name 12/10/18 1740          Home Main Entrance    Number of Stairs, Main Entrance  four  -AG     Stair Railings, Main Entrance  railings safe and in good condition;railings on both sides of stairs  -AG     Row Name 12/10/18 1740          Safety Issues, Functional Mobility    Impairments Affecting Function (Mobility)  balance;coordination;endurance/activity tolerance;pain;strength  -AG     Row Name 12/10/18 1740          Mobility Assessment/Treatment    Extremity Weight-bearing Status  left lower extremity;right lower extremity  -AG     Left Lower Extremity (Weight-bearing Status)  weight-bearing as tolerated (WBAT)  -AG     Right Lower Extremity (Weight-bearing Status)  weight-bearing as tolerated (WBAT)  -AG     Row Name 12/10/18 1740          Bed Mobility Assessment/Treatment    Bed Mobility Assessment/Treatment  scooting/bridging;supine-sit  -AG     Scooting/Bridging [place] (Bed Mobility)  verbal cues;nonverbal cues (demo/gesture);minimum assist (75% patient effort)  -AG     Supine-Sit [place] (Bed Mobility)  verbal cues;nonverbal cues (demo/gesture);minimum assist (75% patient effort)  -AG     Bed Mobility, Safety Issues  decreased use of arms for pushing/pulling;decreased use of legs for bridging/pushing  -AG     Assistive Device (Bed Mobility)  bed rails  -     Row Name 12/10/18 1740          Transfer Assessment/Treatment    Transfer Assessment/Treatment  sit-stand transfer;stand-sit transfer;stand pivot/stand step  transfer  -AG     Maintains Weight-bearing Status (Transfers)  able to maintain  -AG     Sit-Stand [place] (Transfers)  verbal cues;nonverbal cues (demo/gesture);minimum assist (75% patient effort);2 person assist  -AG     Stand-Sit [place] (Transfers)  verbal cues;nonverbal cues (demo/gesture);minimum assist (75% patient effort);2 person assist  -AG     Row Name 12/10/18 1740          Sit-Stand Transfer    Assistive Device (Sit-Stand Transfers)  walker, front-wheeled  -AG     Row Name 12/10/18 1740          Stand-Sit Transfer    Assistive Device (Stand-Sit Transfers)  walker, front-wheeled  -AG     Row Name 12/10/18 1740          Stand Pivot/Stand Step Transfer    Stand Pivot/Stand Step [place]  verbal cues;nonverbal cues (demo/gesture);minimum assist (75% patient effort);2 person assist  -AG     Assistive Device (Stand Pivot Stand Step Transfer)  walker, front-wheeled  -AG     Row Name 12/10/18 1740          Gait/Stairs Assessment/Training    Gait/Stairs Assessment/Training  gait/ambulation independence;gait/ambulation assistive device;distance ambulated;gait pattern;gait deviations;maintains weight-bearing status  -AG     [place] Level (Gait)  verbal cues;nonverbal cues (demo/gesture);minimum assist (75% patient effort);2 person assist  -AG     Assistive Device (Gait)  walker, front-wheeled  -AG     Distance in Feet (Gait)  60  -AG     Pattern (Gait)  step-through  -AG     Deviations/Abnormal Patterns (Gait)  bobby decreased;gait speed decreased  -AG     Row Name 12/10/18 1740          General ROM    GENERAL ROM COMMENTS  B LE AROM WFL  -AG     Row Name 12/10/18 1740          MMT (Manual Muscle Testing)    General MMT Comments  B LE 4/5  -AG     Row Name 12/10/18 1740          Sensory Assessment/Intervention    Sensory General Assessment  no sensation deficits identified  -AG     Row Name 12/10/18 1740          Vision Assessment/Intervention    Visual Impairment/Limitations  WFL  -AG     Row  Name 12/10/18 1740          Pain Assessment    Additional Documentation  Pain Scale: FACES Pre/Post-Treatment (Group)  -AG     Row Name 12/10/18 1740          Pain Scale: Numbers Pre/Post-Treatment    Pain Location - Orientation  generalized  -AG     Pain Location  abdomen  -AG     Row Name 12/10/18 1740          Pain Scale: FACES Pre/Post-Treatment    Pain: FACES Scale, Pretreatment  2-->hurts little bit  -AG     Pain: FACES Scale, Post-Treatment  2-->hurts little bit  -AG     Row Name 12/10/18 1740          Coping    Observed Emotional State  accepting;calm;cooperative;flat  -AG     Verbalized Emotional State  acceptance  -AG     Row Name 12/10/18 1740          Plan of Care Review    Plan of Care Reviewed With  patient;spouse  -AG     Row Name 12/10/18 1740          Physical Therapy Clinical Impression    Date of Referral to PT  12/08/18  -AG     PT Diagnosis (PT Clinical Impression)  decr functional mobility and endurance; LE weakness  -AG     Prognosis (PT Clinical Impression)  fair to good  -AG     Functional Level at Time of Evaluation (PT Clinical Impression)  min A x 2  -AG     Patient/Family Goals Statement (PT Clinical Impression)  return to optimum function  -AG     Criteria for Skilled Interventions Met (PT Clinical Impression)  yes;treatment indicated  -AG     Pathology/Pathophysiology Noted (Describe Specifically for Each System)  musculoskeletal  -AG     Impairments Found (describe specific impairments)  ergonomics and body mechanics;gait, locomotion, and balance;joint integrity and mobility;muscle performance  -AG     Functional Limitations in Following Categories (Describe Specific Limitations)  self-care;community/leisure  -AG     Rehab Potential (PT Clinical Summary)  fair, will monitor progress closely  -AG     Predicted Duration of Therapy (PT)  hospital LOS  -AG     Care Plan Review (PT)  evaluation/treatment results reviewed;care plan/treatment goals reviewed;risks/benefits  reviewed;current/potential barriers reviewed;patient/other agree to care plan  -AG     Care Plan Review, Other Participant (PT Clinical Impression)  spouse  -AG     Row Name 12/10/18 2814          Physical Therapy Goals    Bed Mobility Goal Selection (PT)  bed mobility, PT goal 1  -AG     Transfer Goal Selection (PT)  transfer, PT goal 1  -AG     Gait Training Goal Selection (PT)  gait training, PT goal 1  -AG     Row Name 12/10/18 0233          Bed Mobility Goal 1 (PT)    Activity/Assistive Device (Bed Mobility Goal 1, PT)  scooting  -AG     [place] Level/Cues Needed (Bed Mobility Goal 1, PT)  standby assist  -AG     Time Frame (Bed Mobility Goal 1, PT)  by discharge  -AG     Row Name 12/10/18 7740          Transfer Goal 1 (PT)    Activity/Assistive Device (Transfer Goal 1, PT)  sit-to-stand/stand-to-sit;bed-to-chair/chair-to-bed;other (see comments) appropriate a.d.  -AG     [place] Level/Cues Needed (Transfer Goal 1, PT)  contact guard assist  -AG     Time Frame (Transfer Goal 1, PT)  by discharge  -AG     Row Name 12/10/18 5440          Gait Training Goal 1 (PT)    Activity/Assistive Device (Gait Training Goal 1, PT)  gait (walking locomotion);assistive device use;decrease fall risk;diminish gait deviation;improve balance and speed;increase endurance/gait distance;walker, rolling  -AG     [place] Level (Gait Training Goal 1, PT)  contact guard assist  -AG     Distance (Gait Goal 1, PT)  120  -AG     Time Frame (Gait Training Goal 1, PT)  by discharge  -AG     Row Name 12/10/18 4194          Positioning and Restraints    Pre-Treatment Position  in bed  -AG     Post Treatment Position  bed  -AG     In Bed  sitting EOB;call light within reach;encouraged to call for assist;with family/caregiver;side rails up x3  -AG     Row Name 12/10/18 1564          Living Environment    Home Accessibility  stairs to enter home  -AG       User Key  (r) = Recorded By, (t) = Taken By, (c) = Cosigned By    Initials  Name Provider Type    Aletha Moran, PT Physical Therapist        Physical Therapy Education     Title: PT OT SLP Therapies (Done)     Topic: Physical Therapy (Done)     Point: Mobility training (Done)     Learning Progress Summary           Patient Acceptance, E,D, VU,NR by AG at 12/10/2018  5:52 PM    Acceptance, E,TB, VU by ML at 12/8/2018  9:43 AM    Acceptance, E,TB, VU by AM at 12/7/2018  9:53 AM   Family Acceptance, E,D, VU,NR by AG at 12/10/2018  5:52 PM                   Point: Home exercise program (Done)     Learning Progress Summary           Patient Acceptance, E,D, VU,NR by AG at 12/10/2018  5:52 PM    Acceptance, E,TB, VU by ML at 12/8/2018  9:43 AM    Acceptance, E,TB, VU by AM at 12/7/2018  9:53 AM   Family Acceptance, E,D, VU,NR by AG at 12/10/2018  5:52 PM                   Point: Body mechanics (Done)     Learning Progress Summary           Patient Acceptance, E,D, VU,NR by AG at 12/10/2018  5:52 PM    Acceptance, E,TB, VU by ML at 12/8/2018  9:43 AM    Acceptance, E,TB, VU by AM at 12/7/2018  9:53 AM   Family Acceptance, E,D, VU,NR by AG at 12/10/2018  5:52 PM                   Point: Precautions (Done)     Learning Progress Summary           Patient Acceptance, E,D, VU,NR by AG at 12/10/2018  5:52 PM    Acceptance, E,TB, VU by  at 12/8/2018  9:43 AM    Acceptance, E,TB, VU by AM at 12/7/2018  9:53 AM   Family Acceptance, E,D, VU,NR by AG at 12/10/2018  5:52 PM                               User Key     Initials Effective Dates Name Provider Type Discipline     04/03/18 -  Aletha Metcalf, PT Physical Therapist PT    AM 06/11/18 -  Aletha Hargrove, RN Registered Nurse Nurse     07/25/18 -  Mable Cooper RN Registered Nurse Nurse              PT Recommendation and Plan  Anticipated Discharge Disposition (PT): home with assist, home with home health  Planned Therapy Interventions (PT Eval): balance training, bed mobility training, gait training, home exercise program, patient/family  education, strengthening, transfer training  Therapy Frequency (PT Clinical Impression): other (see comments)(3-5 times/ week per priority policy)  Outcome Summary/Treatment Plan (PT)  Anticipated Equipment Needs at Discharge (PT): (TBD)  Anticipated Discharge Disposition (PT): home with assist, home with home health  Plan of Care Reviewed With: patient, spouse  Outcome Measures     Row Name 12/10/18 1700 12/10/18 1519          How much help from another person do you currently need...    Turning from your back to your side while in flat bed without using bedrails?  3  -AG  --     Moving from lying on back to sitting on the side of a flat bed without bedrails?  3  -AG  --     Moving to and from a bed to a chair (including a wheelchair)?  3  -AG  --     Standing up from a chair using your arms (e.g., wheelchair, bedside chair)?  3  -AG  --     Climbing 3-5 steps with a railing?  2  -AG  --     To walk in hospital room?  3  -AG  --     AM-PAC 6 Clicks Score  17  -AG  --        How much help from another is currently needed...    Putting on and taking off regular lower body clothing?  --  2  -KH     Bathing (including washing, rinsing, and drying)  --  2  -KH     Toileting (which includes using toilet bed pan or urinal)  --  2  -KH     Putting on and taking off regular upper body clothing  --  3  -KH     Taking care of personal grooming (such as brushing teeth)  --  3  -KH     Eating meals  --  3  -KH     Score  --  15  -KH        Functional Assessment    Outcome Measure Options  AM-PAC 6 Clicks Basic Mobility (PT)  -AG  AM-PAC 6 Clicks Daily Activity (OT)  -KH       User Key  (r) = Recorded By, (t) = Taken By, (c) = Cosigned By    Initials Name Provider Type    AG Aletha Metcalf, PT Physical Therapist    Tabatha Cantor, OT Occupational Therapist         Time Calculation:   PT Charges     Row Name 12/10/18 9038             Time Calculation    PT Received On  12/10/18  -      PT - Next Appointment   12/11/18  -AG      PT Goal Re-Cert Due Date  12/24/18  -AG         Time Calculation- PT    TCU Minutes- PT  25 min  -AG        User Key  (r) = Recorded By, (t) = Taken By, (c) = Cosigned By    Initials Name Provider Type    Aletha Moran, PT Physical Therapist        Therapy Suggested Charges     Code   Minutes Charges    None           Therapy Charges for Today     Code Description Service Date Service Provider Modifiers Qty    81208031343 HC PT MOBILITY CURRENT 12/10/2018 Aletha Metcalf, PT GP, CK 1    70942026011 HC PT MOBILITY PROJECTED 12/10/2018 Aletha Metcalf, PT GP, CJ 1    57411197531 HC GAIT TRAINING EA 15 MIN 12/10/2018 Aletha Metcalf, PT GP 1    95545456668 HC PT EVAL HIGH COMPLEXITY 1 12/10/2018 Aletha Metcalf, PT GP 1    17144693748 HC PT THER SUPP EA 15 MIN 12/10/2018 Aletha Metcalf, PT GP 1          PT G-Codes  Outcome Measure Options: AM-PAC 6 Clicks Basic Mobility (PT)  AM-PAC 6 Clicks Score: 17  Score: 15  Functional Limitation: Mobility: Walking and moving around  Mobility: Walking and Moving Around Current Status (): At least 40 percent but less than 60 percent impaired, limited or restricted  Mobility: Walking and Moving Around Goal Status (): At least 20 percent but less than 40 percent impaired, limited or restricted      Aletha Mectalf PT  12/10/2018

## 2018-12-10 NOTE — PROGRESS NOTES
Assisted By: Natividad BEE    CC: Follow-up pancreas cancer    Interview History/HPI: Patient is still complaining of high-intensity abdominal pain diffuse does not really no exacerbating or relieving factors, persistent, associated with nausea, in the context of known pancreatic cancer.  He denies any chest pain breathing is doing about the same.  He was nauseated sometimes with the smell of food, he is on TPN.  He's had no further fever overnight, mild cough.      Vitals:    12/10/18 1643   BP: 152/87   Pulse: 73   Resp: 20   Temp: 97.7 °F (36.5 °C)   SpO2: 95%       Intake/Output Summary (Last 24 hours) at 12/10/2018 1708  Last data filed at 12/10/2018 1245  Gross per 24 hour   Intake 660 ml   Output 650 ml   Net 10 ml       EXAM: He is an ill-appearing male however in no distress.  Skin is warm and dry lungs bilateral breath sounds overall are clear heart regular rate and rhythm without murmur rub or gallop.  Abdomen is soft but diffusely tender without peritoneal signs no definite organomegaly appreciated.  Affect is flat but overall mood is acceptable, he is alert and nonfocal.  His wife is also at bedside.    Tele: Sinus rhythm, reviewed    LABS:   Lab Results (last 48 hours)     Procedure Component Value Units Date/Time    POC Glucose Once [[id number]]  (Abnormal) Collected:  12/10/18 1639    Specimen:  Blood Updated:  12/10/18 1648     Glucose 141 mg/dL     POC Glucose Once [[id number]]  (Abnormal) Collected:  12/10/18 1057    Specimen:  Blood Updated:  12/10/18 1104     Glucose 156 mg/dL     POC Glucose Once [[id number]]  (Abnormal) Collected:  12/10/18 0726    Specimen:  Blood Updated:  12/10/18 0756     Glucose 139 mg/dL     Basic Metabolic Panel [[id number]]  (Abnormal) Collected:  12/10/18 0453    Specimen:  Blood Updated:  12/10/18 0623     Glucose 134 mg/dL      BUN 32 mg/dL      Creatinine 1.22 mg/dL      Sodium 135 mmol/L      Potassium 4.4 mmol/L      Chloride 101 mmol/L      CO2 27.0 mmol/L      Calcium  8.7 mg/dL      eGFR Non African Amer 59 mL/min/1.73      BUN/Creatinine Ratio 26.2     Anion Gap 7.0 mmol/L     Narrative:       GFR Normal >60  Chronic Kidney Disease <60  Kidney Failure <15    C-reactive Protein [[id number]]  (Normal) Collected:  12/10/18 0453    Specimen:  Blood Updated:  12/10/18 0623     C-Reactive Protein 0.67 mg/dL     Osmolality, Calculated [[id number]]  (Normal) Collected:  12/10/18 0453    Specimen:  Blood Updated:  12/10/18 0623     Osmolality Calc 279.0 mOsm/kg     CBC & Differential [[id number]] Collected:  12/10/18 0453    Specimen:  Blood Updated:  12/10/18 0557    Narrative:       The following orders were created for panel order CBC & Differential.  Procedure                               Abnormality         Status                     ---------                               -----------         ------                     CBC Auto Differential[[id number]]        Abnormal            Final result                 Please view results for these tests on the individual orders.    CBC Auto Differential [[id number]]  (Abnormal) Collected:  12/10/18 0453    Specimen:  Blood Updated:  12/10/18 0557     WBC 8.18 10*3/mm3      RBC 3.61 10*6/mm3      Hemoglobin 10.5 g/dL      Hematocrit 33.4 %      MCV 92.5 fL      MCH 29.1 pg      MCHC 31.4 g/dL      RDW 16.3 %      RDW-SD 52.4 fl      MPV 10.5 fL      Platelets 217 10*3/mm3      Neutrophil % 58.9 %      Lymphocyte % 17.5 %      Monocyte % 16.7 %      Eosinophil % 3.5 %      Basophil % 0.5 %      Immature Grans % 2.9 %      Neutrophils, Absolute 4.81 10*3/mm3      Lymphocytes, Absolute 1.43 10*3/mm3      Monocytes, Absolute 1.37 10*3/mm3      Eosinophils, Absolute 0.29 10*3/mm3      Basophils, Absolute 0.04 10*3/mm3      Immature Grans, Absolute 0.24 10*3/mm3     Blood Culture - Blood, Arm, Left [[id number]] Collected:  12/06/18 5540    Specimen:  Blood from Arm, Left Updated:  12/09/18 3543     Blood Culture No growth at 3 days    Blood Culture -  Blood, Arm, Right [[id number]] Collected:  12/06/18 2312    Specimen:  Blood from Arm, Right Updated:  12/09/18 2345     Blood Culture No growth at 3 days    POC Glucose Once [[id number]]  (Abnormal) Collected:  12/09/18 2227    Specimen:  Blood Updated:  12/09/18 2235     Glucose 133 mg/dL     POC Glucose Once [[id number]]  (Abnormal) Collected:  12/09/18 1642    Specimen:  Blood Updated:  12/09/18 1647     Glucose 160 mg/dL     POC Glucose Once [[id number]]  (Abnormal) Collected:  12/09/18 1225    Specimen:  Blood Updated:  12/09/18 1231     Glucose 141 mg/dL     Urine Culture - Urine, Urine, Clean Catch [[id number]]  (Normal) Collected:  12/06/18 2342    Specimen:  Urine, Clean Catch Updated:  12/09/18 1058     Urine Culture No growth    POC Glucose Once [[id number]]  (Abnormal) Collected:  12/09/18 0601    Specimen:  Blood Updated:  12/09/18 0607     Glucose 142 mg/dL     Mycoplasma Pneumoniae Antibody, IgM [[id number]]  (Normal) Collected:  12/09/18 0440    Specimen:  Blood Updated:  12/09/18 0522     Mycoplasma pneumo IgM Negative    Osmolality, Calculated [[id number]]  (Normal) Collected:  12/09/18 0412    Specimen:  Blood Updated:  12/09/18 0516     Osmolality Calc 278.9 mOsm/kg     Comprehensive Metabolic Panel [[id number]]  (Abnormal) Collected:  12/09/18 0412    Specimen:  Blood Updated:  12/09/18 0516     Glucose 127 mg/dL      BUN 33 mg/dL      Creatinine 1.25 mg/dL      Sodium 135 mmol/L      Potassium 4.8 mmol/L      Comment: 1+ Hemolysis         Chloride 103 mmol/L      CO2 23.3 mmol/L      Calcium 8.8 mg/dL      Total Protein 6.0 g/dL      Albumin 3.20 g/dL      ALT (SGPT) 10 U/L      AST (SGOT) 22 U/L      Alkaline Phosphatase 77 U/L      Comment: Note New Reference Ranges        Total Bilirubin 0.2 mg/dL      eGFR Non African Amer 57 mL/min/1.73      Globulin 2.8 gm/dL      A/G Ratio 1.1 g/dL      BUN/Creatinine Ratio 26.4     Anion Gap 8.7 mmol/L     C-reactive Protein [[id number]]  (Abnormal) Collected:   12/09/18 0440    Specimen:  Blood Updated:  12/09/18 0516     C-Reactive Protein 1.05 mg/dL     Magnesium [[id number]]  (Normal) Collected:  12/09/18 0412    Specimen:  Blood Updated:  12/09/18 0514     Magnesium 2.0 mg/dL     Phosphorus [[id number]]  (Normal) Collected:  12/09/18 0412    Specimen:  Blood Updated:  12/09/18 0514     Phosphorus 3.8 mg/dL     CBC & Differential [[id number]] Collected:  12/09/18 0412    Specimen:  Blood Updated:  12/09/18 0500    Narrative:       The following orders were created for panel order CBC & Differential.  Procedure                               Abnormality         Status                     ---------                               -----------         ------                     CBC Auto Differential[[id number]]        Abnormal            Final result                 Please view results for these tests on the individual orders.    CBC Auto Differential [[id number]]  (Abnormal) Collected:  12/09/18 0412    Specimen:  Blood Updated:  12/09/18 0500     WBC 7.62 10*3/mm3      RBC 3.49 10*6/mm3      Hemoglobin 10.2 g/dL      Hematocrit 31.9 %      MCV 91.4 fL      MCH 29.2 pg      MCHC 32.0 g/dL      RDW 16.2 %      RDW-SD 51.5 fl      MPV 9.9 fL      Platelets 240 10*3/mm3      Neutrophil % 65.8 %      Lymphocyte % 18.0 %      Monocyte % 10.8 %      Eosinophil % 1.6 %      Basophil % 0.4 %      Immature Grans % 3.4 %      Neutrophils, Absolute 5.02 10*3/mm3      Lymphocytes, Absolute 1.37 10*3/mm3      Monocytes, Absolute 0.82 10*3/mm3      Eosinophils, Absolute 0.12 10*3/mm3      Basophils, Absolute 0.03 10*3/mm3      Immature Grans, Absolute 0.26 10*3/mm3     Legionella Antigen, Urine - Urine, Urine, Clean Catch [[id number]]  (Normal) Collected:  12/09/18 0318    Specimen:  Urine, Clean Catch Updated:  12/09/18 0340     LEGIONELLA ANTIGEN, URINE Negative    Narrative:       Presumptive negative for L. pneumophilia serogroup 1 antigen, suggesting no recent or current infection.    POC  Glucose Once [[id number]]  (Abnormal) Collected:  12/08/18 2320    Specimen:  Blood Updated:  12/08/18 2326     Glucose 176 mg/dL     Influenza Antigen, Rapid - Swab, Nasopharynx [[id number]]  (Normal) Collected:  12/08/18 1955    Specimen:  Swab from Nasopharynx Updated:  12/08/18 2012     Influenza A Ag, EIA Negative     Influenza B Ag, EIA Negative          Radiology:  Imaging Results (last 72 hours)     Procedure Component Value Units Date/Time    CT Chest Without Contrast [[id number]] Collected:  12/10/18 0728     Updated:  12/10/18 0732    Narrative:       EXAM: CT CHEST WO CONTRAST-              CLINICAL INDICATION:R/O pneumonia. XRAY with consolidation; R50.9-Fever,  unspecified      COMPARISON: COMPARISON: 9/11/2018     TECHNIQUE: Multiple axial CT images were obtained from lung apex through  upper abdomen WITHOUT administration of IV contrast. Reformatted images  in the coronal and/or sagittal plane(s) were generated from the axial  data set to facilitate diagnostic accuracy and/or surgical planning.  Oral Contrast:NONE.     Radiation dose reduction techniques were utilized per ALARA protocol.  Automated exposure control was initiated through either or [employer] or  DoseRigMeedor software packages by  protocol.    DOSE (DLP mGy-cm): 516.01 mGy.cm        FINDINGS:     LUNGS: CHRONIC APPEARING INTERSTITIAL LUNG CHANGES ARE NOTED WITH  UNDERLYING MILD CENTRILOBULAR EMPHYSEMA. SUBPLEURAL AND PERIPHERAL  FIBROSIS OF THE LOWER LUNG ZONES NOTED. NO AIRSPACE CONSOLIDATIONS OR  SUSPICIOUS PULMONARY NODULES/MASSES IDENTIFIED.     HEART: MODERATE CARDIOMEGALY WITH MODERATE CORONARY VASCULAR  CALCIFICATIONS.     PERICARDIUM: No effusion.     MEDIASTINUM: No masses. No enlarged lymph nodes.  No fluid collections.     PLEURA: No pleural effusion. No pleural mass or abnormal calcification.  No pneumothorax.     MAJOR AIRWAYS: Clear. No intrinsic mass.     VASCULATURE: No evidence of aneurysm.     VISUALIZED UPPER  ABDOMEN:SINCE THE PREVIOUS EXAM, THERE HAS BEEN  INTERVAL PLACEMENT OF BILIARY STENT WHICH LIKELY ACCOUNTS FOR PRESENCE  OF PNEUMOBILIA WITHIN THE LIVER.     OTHER: None.     BONES: DEGENERATIVE CHANGES THORACIC SPINE BUT NO ACUTE BONY FINDINGS.       Impression:       1. NO SIGNIFICANT CHANGE IN THE APPEARANCE OF THE CHEST AGAIN  DEMONSTRATING CHRONIC LUNG CHANGES WITH MILD BASILAR SUBPLEURAL  FIBROSIS.  2. STABLE CARDIOMEGALY.  3. INTERVAL PLACEMENT OF BILIARY STENT WHICH ACCOUNTS FOR PNEUMOBILIA.     This report was finalized on 12/10/2018 7:30 AM by Dr. Klever Paredes MD.       XR Chest PA & Lateral [[id number]] Collected:  12/09/18 1115     Updated:  12/09/18 1118    Narrative:       EXAMINATION: XR CHEST PA AND LATERAL-      CLINICAL INDICATION: r/o pneumonia; R50.9-Fever, unspecified          COMPARISON: 12/7/2018      TECHNIQUE: XR CHEST PA AND LATERAL-      FINDINGS:   PowerPort is stable in the superior vena cava   Atelectasis in the left lung base  Equivocal pneumonia in the right infrahilar region   No pneumothorax.   Bony and soft tissue structures are unremarkable.            Impression:       Right infrahilar consolidation     This report was finalized on 12/9/2018 11:16 AM by Dr. Ernesto Bell MD.               Results for orders placed during the hospital encounter of 10/22/17   Adult Transthoracic Echo Complete W/ Cont if Necessary Per Protocol    Narrative · Left ventricular systolic function is normal. Estimated EF = 60%.  · The cardiac valves are anatomically and functionally normal.        D/W DR Rivas and Dr Alves    Assessment/Plan:   Severe sepsis on admission of uncertain etiology.  Infectious disease notes seen, cultures negative and no obvious source, CT chest was negative.  Patient does have a biliary stent and has been bacteremic from this the past however blood cultures negative and transaminases not elevated, not suggestive of cholangitis.  CRP and white count loss.    Pancreatic  cancer, patient appears to be to be getting progressively weaker, will proceed with TPN, discussed with his oncologic surgeon, to be no benefit to transfer the patient to [place] to him as he would not do a Whipple procedure at this time due to the patient's weakness.  He suggested even discussing oncology about the possibility of getting hospice involved.  I discussed with Dr. Alves who is going to evaluate the patient to get her thoughts.  I am going to recheck a CA-19-9.  Patient was almost 8000 on his 19 9 that had come down in November to 1500 but no recent has been drawn.  Patient did have a biopsy that showed atypical cell suggestive of adenocarcinoma.  This was done by endoscopic ultrasound.    Diabetes, glucoses controlled    DVT prophylaxis, full dose Eliquis will serve for this as well    History of paroxysmal atrial fibrillation, on Tambocor, seems to be maintaining sinus rhythm, on Eliquis for stroke prevention    Pain associated with pancreatic cancer, palliative care consulted for improved pain control.

## 2018-12-11 ENCOUNTER — APPOINTMENT (OUTPATIENT)
Dept: ONCOLOGY | Facility: HOSPITAL | Age: 68
End: 2018-12-11
Attending: INTERNAL MEDICINE

## 2018-12-11 LAB
ALBUMIN SERPL-MCNC: 3.6 G/DL (ref 3.4–4.8)
ALBUMIN/GLOB SERPL: 1.4 G/DL (ref 1.5–2.5)
ALP SERPL-CCNC: 83 U/L (ref 40–129)
ALT SERPL W P-5'-P-CCNC: 13 U/L (ref 10–44)
ANION GAP SERPL CALCULATED.3IONS-SCNC: 5.4 MMOL/L (ref 3.6–11.2)
AST SERPL-CCNC: 18 U/L (ref 10–34)
BACTERIA SPEC AEROBE CULT: NORMAL
BACTERIA SPEC AEROBE CULT: NORMAL
BASOPHILS # BLD AUTO: 0.03 10*3/MM3 (ref 0–0.3)
BASOPHILS NFR BLD AUTO: 0.4 % (ref 0–2)
BILIRUB SERPL-MCNC: 0.2 MG/DL (ref 0.2–1.8)
BUN BLD-MCNC: 28 MG/DL (ref 7–21)
BUN/CREAT SERPL: 22 (ref 7–25)
CALCIUM SPEC-SCNC: 9.2 MG/DL (ref 7.7–10)
CHLORIDE SERPL-SCNC: 101 MMOL/L (ref 99–112)
CO2 SERPL-SCNC: 27.6 MMOL/L (ref 24.3–31.9)
CREAT BLD-MCNC: 1.27 MG/DL (ref 0.43–1.29)
CRP SERPL-MCNC: 0.53 MG/DL (ref 0–0.99)
DEPRECATED RDW RBC AUTO: 53.2 FL (ref 37–54)
EOSINOPHIL # BLD AUTO: 0.43 10*3/MM3 (ref 0–0.7)
EOSINOPHIL NFR BLD AUTO: 5.4 % (ref 0–7)
ERYTHROCYTE [DISTWIDTH] IN BLOOD BY AUTOMATED COUNT: 16.2 % (ref 11.5–14.5)
GFR SERPL CREATININE-BSD FRML MDRD: 56 ML/MIN/1.73
GLOBULIN UR ELPH-MCNC: 2.6 GM/DL
GLUCOSE BLD-MCNC: 143 MG/DL (ref 70–110)
GLUCOSE BLDC GLUCOMTR-MCNC: 141 MG/DL (ref 70–130)
GLUCOSE BLDC GLUCOMTR-MCNC: 144 MG/DL (ref 70–130)
GLUCOSE BLDC GLUCOMTR-MCNC: 159 MG/DL (ref 70–130)
GLUCOSE BLDC GLUCOMTR-MCNC: 161 MG/DL (ref 70–130)
HCT VFR BLD AUTO: 32.8 % (ref 42–52)
HGB BLD-MCNC: 10.7 G/DL (ref 14–18)
IMM GRANULOCYTES # BLD: 0.31 10*3/MM3 (ref 0–0.03)
IMM GRANULOCYTES NFR BLD: 3.9 % (ref 0–0.5)
LYMPHOCYTES # BLD AUTO: 1.58 10*3/MM3 (ref 1–3)
LYMPHOCYTES NFR BLD AUTO: 19.8 % (ref 16–46)
MAGNESIUM SERPL-MCNC: 1.8 MG/DL (ref 1.7–2.6)
MCH RBC QN AUTO: 29.4 PG (ref 27–33)
MCHC RBC AUTO-ENTMCNC: 32.6 G/DL (ref 33–37)
MCV RBC AUTO: 90.1 FL (ref 80–94)
MONOCYTES # BLD AUTO: 1.15 10*3/MM3 (ref 0.1–0.9)
MONOCYTES NFR BLD AUTO: 14.4 % (ref 0–12)
NEUTROPHILS # BLD AUTO: 4.47 10*3/MM3 (ref 1.4–6.5)
NEUTROPHILS NFR BLD AUTO: 56.1 % (ref 40–75)
OSMOLALITY SERPL CALC.SUM OF ELEC: 276.2 MOSM/KG (ref 273–305)
PHOSPHATE SERPL-MCNC: 4.4 MG/DL (ref 2.7–4.5)
PLATELET # BLD AUTO: 197 10*3/MM3 (ref 130–400)
PMV BLD AUTO: 10.1 FL (ref 6–10)
POTASSIUM BLD-SCNC: 4 MMOL/L (ref 3.5–5.3)
PROT SERPL-MCNC: 6.2 G/DL (ref 6–8)
RBC # BLD AUTO: 3.64 10*6/MM3 (ref 4.7–6.1)
SODIUM BLD-SCNC: 134 MMOL/L (ref 135–153)
VANCOMYCIN TROUGH SERPL-MCNC: 22 MCG/ML (ref 5–15)
WBC NRBC COR # BLD: 7.97 10*3/MM3 (ref 4.5–12.5)

## 2018-12-11 PROCEDURE — 63710000001 INSULIN ASPART PER 5 UNITS

## 2018-12-11 PROCEDURE — 82962 GLUCOSE BLOOD TEST: CPT

## 2018-12-11 PROCEDURE — 25010000003 MICAFUNGIN SODIUM 100 MG RECONSTITUTED SOLUTION 1 EACH VIAL: Performed by: PHYSICIAN ASSISTANT

## 2018-12-11 PROCEDURE — 80202 ASSAY OF VANCOMYCIN: CPT

## 2018-12-11 PROCEDURE — 84100 ASSAY OF PHOSPHORUS: CPT

## 2018-12-11 PROCEDURE — 97110 THERAPEUTIC EXERCISES: CPT

## 2018-12-11 PROCEDURE — 25010000002 ONDANSETRON PER 1 MG: Performed by: INTERNAL MEDICINE

## 2018-12-11 PROCEDURE — 94799 UNLISTED PULMONARY SVC/PX: CPT

## 2018-12-11 PROCEDURE — 63710000001 DRONABINOL PER 2.5 MG: Performed by: INTERNAL MEDICINE

## 2018-12-11 PROCEDURE — 25010000002 VANCOMYCIN 5 G RECONSTITUTED SOLUTION 5,000 MG VIAL

## 2018-12-11 PROCEDURE — 85025 COMPLETE CBC W/AUTO DIFF WBC: CPT | Performed by: INTERNAL MEDICINE

## 2018-12-11 PROCEDURE — 25010000002 PROMETHAZINE PER 50 MG: Performed by: NURSE PRACTITIONER

## 2018-12-11 PROCEDURE — 99233 SBSQ HOSP IP/OBS HIGH 50: CPT | Performed by: NURSE PRACTITIONER

## 2018-12-11 PROCEDURE — 85007 BL SMEAR W/DIFF WBC COUNT: CPT | Performed by: INTERNAL MEDICINE

## 2018-12-11 PROCEDURE — 25010000002 PROMETHAZINE PER 50 MG

## 2018-12-11 PROCEDURE — 97530 THERAPEUTIC ACTIVITIES: CPT

## 2018-12-11 PROCEDURE — 25010000002 CEFEPIME 2 G/NS 100 ML SOLUTION: Performed by: INTERNAL MEDICINE

## 2018-12-11 PROCEDURE — 86140 C-REACTIVE PROTEIN: CPT | Performed by: NURSE PRACTITIONER

## 2018-12-11 PROCEDURE — 83735 ASSAY OF MAGNESIUM: CPT

## 2018-12-11 PROCEDURE — 99232 SBSQ HOSP IP/OBS MODERATE 35: CPT | Performed by: INTERNAL MEDICINE

## 2018-12-11 PROCEDURE — 97116 GAIT TRAINING THERAPY: CPT

## 2018-12-11 PROCEDURE — 80053 COMPREHEN METABOLIC PANEL: CPT

## 2018-12-11 RX ORDER — SODIUM CHLORIDE 9 MG/ML
INJECTION, SOLUTION INTRAVENOUS
Status: COMPLETED
Start: 2018-12-11 | End: 2018-12-11

## 2018-12-11 RX ORDER — MAGNESIUM SULFATE HEPTAHYDRATE 40 MG/ML
4 INJECTION, SOLUTION INTRAVENOUS ONCE
Status: COMPLETED | OUTPATIENT
Start: 2018-12-11 | End: 2018-12-11

## 2018-12-11 RX ORDER — PROMETHAZINE HYDROCHLORIDE 25 MG/ML
12.5 INJECTION, SOLUTION INTRAMUSCULAR; INTRAVENOUS 4 TIMES DAILY
Status: DISCONTINUED | OUTPATIENT
Start: 2018-12-11 | End: 2018-12-11 | Stop reason: CLARIF

## 2018-12-11 RX ORDER — MAGNESIUM SULFATE HEPTAHYDRATE 40 MG/ML
4 INJECTION, SOLUTION INTRAVENOUS ONCE
Status: DISCONTINUED | OUTPATIENT
Start: 2018-12-11 | End: 2018-12-11

## 2018-12-11 RX ORDER — PROMETHAZINE HYDROCHLORIDE 25 MG/ML
12.5 INJECTION, SOLUTION INTRAMUSCULAR; INTRAVENOUS 4 TIMES DAILY
Status: DISCONTINUED | OUTPATIENT
Start: 2018-12-11 | End: 2018-12-13 | Stop reason: HOSPADM

## 2018-12-11 RX ADMIN — PROMETHAZINE HYDROCHLORIDE 12.5 MG: 25 INJECTION INTRAMUSCULAR; INTRAVENOUS at 17:28

## 2018-12-11 RX ADMIN — ALBUTEROL SULFATE 2.5 MG: 2.5 SOLUTION RESPIRATORY (INHALATION) at 07:01

## 2018-12-11 RX ADMIN — ALBUTEROL SULFATE 2.5 MG: 2.5 SOLUTION RESPIRATORY (INHALATION) at 18:57

## 2018-12-11 RX ADMIN — DRONABINOL 5 MG: 2.5 CAPSULE ORAL at 07:47

## 2018-12-11 RX ADMIN — INSULIN ASPART 2 UNITS: 100 INJECTION, SOLUTION INTRAVENOUS; SUBCUTANEOUS at 11:22

## 2018-12-11 RX ADMIN — SODIUM CHLORIDE 50 ML: 9 INJECTION, SOLUTION INTRAVENOUS at 07:38

## 2018-12-11 RX ADMIN — MORPHINE SULFATE 30 MG: 30 TABLET, EXTENDED RELEASE ORAL at 22:13

## 2018-12-11 RX ADMIN — ALLOPURINOL 100 MG: 100 TABLET ORAL at 08:39

## 2018-12-11 RX ADMIN — CEFEPIME 2 G: 2 INJECTION, POWDER, FOR SOLUTION INTRAVENOUS at 15:55

## 2018-12-11 RX ADMIN — APIXABAN 5 MG: 5 TABLET, FILM COATED ORAL at 08:39

## 2018-12-11 RX ADMIN — FLUOXETINE 20 MG: 20 CAPSULE ORAL at 08:39

## 2018-12-11 RX ADMIN — PANTOPRAZOLE SODIUM 40 MG: 40 TABLET, DELAYED RELEASE ORAL at 05:29

## 2018-12-11 RX ADMIN — SODIUM CHLORIDE 50 ML: 9 INJECTION, SOLUTION INTRAVENOUS at 17:28

## 2018-12-11 RX ADMIN — PROMETHAZINE HYDROCHLORIDE 12.5 MG: 25 INJECTION INTRAMUSCULAR; INTRAVENOUS at 11:33

## 2018-12-11 RX ADMIN — ONDANSETRON 8 MG: 2 INJECTION INTRAMUSCULAR; INTRAVENOUS at 23:30

## 2018-12-11 RX ADMIN — SODIUM CHLORIDE, PRESERVATIVE FREE 3 ML: 5 INJECTION INTRAVENOUS at 08:39

## 2018-12-11 RX ADMIN — MORPHINE SULFATE 30 MG: 30 TABLET, EXTENDED RELEASE ORAL at 08:39

## 2018-12-11 RX ADMIN — FLECAINIDE ACETATE 50 MG: 50 TABLET ORAL at 08:39

## 2018-12-11 RX ADMIN — CEFEPIME 2 G: 2 INJECTION, POWDER, FOR SOLUTION INTRAVENOUS at 23:50

## 2018-12-11 RX ADMIN — INSULIN ASPART 2 UNITS: 100 INJECTION, SOLUTION INTRAVENOUS; SUBCUTANEOUS at 23:01

## 2018-12-11 RX ADMIN — COLCHICINE 0.6 MG: 0.6 TABLET, FILM COATED ORAL at 08:39

## 2018-12-11 RX ADMIN — DRONABINOL 5 MG: 2.5 CAPSULE ORAL at 17:28

## 2018-12-11 RX ADMIN — FLECAINIDE ACETATE 50 MG: 50 TABLET ORAL at 22:13

## 2018-12-11 RX ADMIN — CEFEPIME 2 G: 2 INJECTION, POWDER, FOR SOLUTION INTRAVENOUS at 07:50

## 2018-12-11 RX ADMIN — CHOLECALCIFEROL TAB 10 MCG (400 UNIT) 1000 UNITS: 10 TAB at 08:39

## 2018-12-11 RX ADMIN — APIXABAN 5 MG: 5 TABLET, FILM COATED ORAL at 22:13

## 2018-12-11 RX ADMIN — MAGNESIUM SULFATE HEPTAHYDRATE 4 G: 40 INJECTION, SOLUTION INTRAVENOUS at 11:21

## 2018-12-11 RX ADMIN — FLUOXETINE 20 MG: 20 CAPSULE ORAL at 22:13

## 2018-12-11 RX ADMIN — PROMETHAZINE HYDROCHLORIDE 12.5 MG: 25 INJECTION INTRAMUSCULAR; INTRAVENOUS at 22:09

## 2018-12-11 RX ADMIN — VANCOMYCIN HYDROCHLORIDE 1500 MG: 5 INJECTION, POWDER, LYOPHILIZED, FOR SOLUTION INTRAVENOUS at 11:46

## 2018-12-11 RX ADMIN — PROMETHAZINE HYDROCHLORIDE 12.5 MG: 25 INJECTION INTRAMUSCULAR; INTRAVENOUS at 07:38

## 2018-12-11 RX ADMIN — MONTELUKAST SODIUM 10 MG: 10 TABLET, COATED ORAL at 08:39

## 2018-12-11 RX ADMIN — ONDANSETRON 4 MG: 2 INJECTION, SOLUTION INTRAMUSCULAR; INTRAVENOUS at 05:29

## 2018-12-11 RX ADMIN — ONDANSETRON 8 MG: 2 INJECTION INTRAMUSCULAR; INTRAVENOUS at 13:39

## 2018-12-11 RX ADMIN — MICAFUNGIN SODIUM 100 MG: 20 INJECTION, POWDER, LYOPHILIZED, FOR SOLUTION INTRAVENOUS at 22:06

## 2018-12-11 RX ADMIN — LEUCINE, PHENYLALANINE, LYSINE, METHIONINE, ISOLEUCINE, VALINE, HISTIDINE, THREONINE, TRYPTOPHAN, ALANINE, GLYCINE, ARGININE, PROLINE, SERINE, TYROSINE, SODIUM ACETATE, DIBASIC POTASSIUM PHOSPHATE, MAGNESIUM CHLORIDE, SODIUM CHLORIDE, CALCIUM CHLORIDE, DEXTROSE
365; 280; 290; 200; 300; 290; 240; 210; 90; 1035; 515; 575; 340; 250; 20; 340; 261; 51; 59; 33; 20 INJECTION INTRAVENOUS at 19:20

## 2018-12-11 RX ADMIN — SODIUM CHLORIDE, PRESERVATIVE FREE 3 ML: 5 INJECTION INTRAVENOUS at 22:14

## 2018-12-11 NOTE — PLAN OF CARE
Problem: Patient Care Overview  Goal: Plan of Care Review  Outcome: Ongoing (interventions implemented as appropriate)   12/11/18 1121   Coping/Psychosocial   Plan of Care Reviewed With patient   Coping/Psychosocial   Patient Agreement with Plan of Care agrees   Plan of Care Review   Progress improving   OTHER   Outcome Summary Pt. performed all therapeutic activities as prescribed and required adequate rest breaks. Demonstrated improvements towards functional independence.

## 2018-12-11 NOTE — PROGRESS NOTES
Assisted By: Heidi BEE, Wife also present    CC: Nausea    Interview History/HPI: Patient states he is feeling a little better today.  He has tolerated some of his meal, the scheduled anti-medic seem to be helping.  He states his abdominal pain is somewhat improved, he had a bowel movement night before last.  He did ambulate down the [place] with therapy.    Vitals:    12/11/18 1100   BP: 118/78   Pulse: 86   Resp: 20   Temp: 98.1 °F (36.7 °C)   SpO2: 92%       Intake/Output Summary (Last 24 hours) at 12/11/2018 1308  Last data filed at 12/11/2018 0300  Gross per 24 hour   Intake 240 ml   Output --   Net 240 ml       EXAM: He does overall appear to feel better today, lungs have bilateral breath sounds are clear no rhonchi rales or wheezing heard, heart regular rate and rhythm without murmur rub or gallop, abdomen is soft and nontender today bowel sounds are active no definite organomegaly appreciated extremities are without edema strength is symmetric he was somewhat somnolent but was easily awakened and answered questions.  Wife states that he slept better in the private room last p.m.     Tele: Sinus rhythm, reviewed    LABS:   Lab Results (last 48 hours)     Procedure Component Value Units Date/Time    POC Glucose Once [[id number]]  (Abnormal) Collected:  12/11/18 1056    Specimen:  Blood Updated:  12/11/18 1107     Glucose 159 mg/dL     POC Glucose Once [[id number]]  (Abnormal) Collected:  12/11/18 0528    Specimen:  Blood Updated:  12/11/18 0535     Glucose 141 mg/dL     C-reactive Protein [[id number]]  (Normal) Collected:  12/11/18 0435    Specimen:  Blood Updated:  12/11/18 0520     C-Reactive Protein 0.53 mg/dL     Vancomycin, Trough [[id number]]  (Abnormal) Collected:  12/11/18 0435    Specimen:  Blood Updated:  12/11/18 0517     Vancomycin Trough 22.00 mcg/mL     Comprehensive Metabolic Panel [[id number]]  (Abnormal) Collected:  12/11/18 0435    Specimen:  Blood Updated:  12/11/18 0512     Glucose 143 mg/dL       BUN 28 mg/dL      Creatinine 1.27 mg/dL      Sodium 134 mmol/L      Potassium 4.0 mmol/L      Chloride 101 mmol/L      CO2 27.6 mmol/L      Calcium 9.2 mg/dL      Total Protein 6.2 g/dL      Albumin 3.60 g/dL      ALT (SGPT) 13 U/L      AST (SGOT) 18 U/L      Alkaline Phosphatase 83 U/L      Comment: Note New Reference Ranges        Total Bilirubin 0.2 mg/dL      eGFR Non African Amer 56 mL/min/1.73      Globulin 2.6 gm/dL      A/G Ratio 1.4 g/dL      BUN/Creatinine Ratio 22.0     Anion Gap 5.4 mmol/L     Osmolality, Calculated [[id number]]  (Normal) Collected:  12/11/18 0435    Specimen:  Blood Updated:  12/11/18 0512     Osmolality Calc 276.2 mOsm/kg     Magnesium [[id number]]  (Normal) Collected:  12/11/18 0435    Specimen:  Blood Updated:  12/11/18 0512     Magnesium 1.8 mg/dL     Phosphorus [[id number]]  (Normal) Collected:  12/11/18 0435    Specimen:  Blood Updated:  12/11/18 0512     Phosphorus 4.4 mg/dL     CBC & Differential [[id number]] Collected:  12/11/18 0435    Specimen:  Blood Updated:  12/11/18 0455    Narrative:       The following orders were created for panel order CBC & Differential.  Procedure                               Abnormality         Status                     ---------                               -----------         ------                     Scan Slide[[id number]]                                       Final result               CBC Auto Differential[[id number]]        Abnormal            Final result                 Please view results for these tests on the individual orders.    Scan Slide [[id number]] Collected:  12/11/18 0435    Specimen:  Blood Updated:  12/11/18 0455    CBC Auto Differential [[id number]]  (Abnormal) Collected:  12/11/18 0435    Specimen:  Blood Updated:  12/11/18 0455     WBC 7.97 10*3/mm3      RBC 3.64 10*6/mm3      Hemoglobin 10.7 g/dL      Hematocrit 32.8 %      MCV 90.1 fL      MCH 29.4 pg      MCHC 32.6 g/dL      RDW 16.2 %      RDW-SD 53.2 fl      MPV 10.1 fL       Platelets 197 10*3/mm3      Neutrophil % 56.1 %      Lymphocyte % 19.8 %      Monocyte % 14.4 %      Eosinophil % 5.4 %      Basophil % 0.4 %      Immature Grans % 3.9 %      Neutrophils, Absolute 4.47 10*3/mm3      Lymphocytes, Absolute 1.58 10*3/mm3      Monocytes, Absolute 1.15 10*3/mm3      Eosinophils, Absolute 0.43 10*3/mm3      Basophils, Absolute 0.03 10*3/mm3      Immature Grans, Absolute 0.31 10*3/mm3     Blood Culture - Blood, Arm, Right [[id number]] Collected:  12/06/18 2312    Specimen:  Blood from Arm, Right Updated:  12/10/18 2345     Blood Culture No growth at 4 days    Blood Culture - Blood, Arm, Left [[id number]] Collected:  12/06/18 2305    Specimen:  Blood from Arm, Left Updated:  12/10/18 2345     Blood Culture No growth at 4 days    POC Glucose Once [[id number]]  (Abnormal) Collected:  12/10/18 2306    Specimen:  Blood Updated:  12/10/18 2312     Glucose 145 mg/dL     Cancer Antigen 19-9 [[id number]] Collected:  12/10/18 1810    Specimen:  Blood Updated:  12/10/18 1909    POC Glucose Once [[id number]]  (Abnormal) Collected:  12/10/18 1639    Specimen:  Blood Updated:  12/10/18 1648     Glucose 141 mg/dL     POC Glucose Once [[id number]]  (Abnormal) Collected:  12/10/18 1057    Specimen:  Blood Updated:  12/10/18 1104     Glucose 156 mg/dL     POC Glucose Once [[id number]]  (Abnormal) Collected:  12/10/18 0726    Specimen:  Blood Updated:  12/10/18 0756     Glucose 139 mg/dL     Basic Metabolic Panel [[id number]]  (Abnormal) Collected:  12/10/18 0453    Specimen:  Blood Updated:  12/10/18 0623     Glucose 134 mg/dL      BUN 32 mg/dL      Creatinine 1.22 mg/dL      Sodium 135 mmol/L      Potassium 4.4 mmol/L      Chloride 101 mmol/L      CO2 27.0 mmol/L      Calcium 8.7 mg/dL      eGFR Non African Amer 59 mL/min/1.73      BUN/Creatinine Ratio 26.2     Anion Gap 7.0 mmol/L     Narrative:       GFR Normal >60  Chronic Kidney Disease <60  Kidney Failure <15    C-reactive Protein [[id number]]  (Normal)  Collected:  12/10/18 0453    Specimen:  Blood Updated:  12/10/18 0623     C-Reactive Protein 0.67 mg/dL     Osmolality, Calculated [[id number]]  (Normal) Collected:  12/10/18 0453    Specimen:  Blood Updated:  12/10/18 0623     Osmolality Calc 279.0 mOsm/kg     CBC & Differential [[id number]] Collected:  12/10/18 0453    Specimen:  Blood Updated:  12/10/18 0557    Narrative:       The following orders were created for panel order CBC & Differential.  Procedure                               Abnormality         Status                     ---------                               -----------         ------                     CBC Auto Differential[[id number]]        Abnormal            Final result                 Please view results for these tests on the individual orders.    CBC Auto Differential [[id number]]  (Abnormal) Collected:  12/10/18 0453    Specimen:  Blood Updated:  12/10/18 0557     WBC 8.18 10*3/mm3      RBC 3.61 10*6/mm3      Hemoglobin 10.5 g/dL      Hematocrit 33.4 %      MCV 92.5 fL      MCH 29.1 pg      MCHC 31.4 g/dL      RDW 16.3 %      RDW-SD 52.4 fl      MPV 10.5 fL      Platelets 217 10*3/mm3      Neutrophil % 58.9 %      Lymphocyte % 17.5 %      Monocyte % 16.7 %      Eosinophil % 3.5 %      Basophil % 0.5 %      Immature Grans % 2.9 %      Neutrophils, Absolute 4.81 10*3/mm3      Lymphocytes, Absolute 1.43 10*3/mm3      Monocytes, Absolute 1.37 10*3/mm3      Eosinophils, Absolute 0.29 10*3/mm3      Basophils, Absolute 0.04 10*3/mm3      Immature Grans, Absolute 0.24 10*3/mm3     POC Glucose Once [[id number]]  (Abnormal) Collected:  12/09/18 2227    Specimen:  Blood Updated:  12/09/18 2235     Glucose 133 mg/dL     POC Glucose Once [[id number]]  (Abnormal) Collected:  12/09/18 1642    Specimen:  Blood Updated:  12/09/18 1647     Glucose 160 mg/dL           Radiology:  Imaging Results (last 72 hours)     Procedure Component Value Units Date/Time    CT Chest Without Contrast [[id number]] Collected:   12/10/18 0728     Updated:  12/10/18 0732    Narrative:       EXAM: CT CHEST WO CONTRAST-              CLINICAL INDICATION:R/O pneumonia. XRAY with consolidation; R50.9-Fever,  unspecified      COMPARISON: COMPARISON: 9/11/2018     TECHNIQUE: Multiple axial CT images were obtained from lung apex through  upper abdomen WITHOUT administration of IV contrast. Reformatted images  in the coronal and/or sagittal plane(s) were generated from the axial  data set to facilitate diagnostic accuracy and/or surgical planning.  Oral Contrast:NONE.     Radiation dose reduction techniques were utilized per ALARA protocol.  Automated exposure control was initiated through either or [employer] or  [employer] software packages by  protocol.    DOSE (DLP mGy-cm): 516.01 mGy.cm        FINDINGS:     LUNGS: CHRONIC APPEARING INTERSTITIAL LUNG CHANGES ARE NOTED WITH  UNDERLYING MILD CENTRILOBULAR EMPHYSEMA. SUBPLEURAL AND PERIPHERAL  FIBROSIS OF THE LOWER LUNG ZONES NOTED. NO AIRSPACE CONSOLIDATIONS OR  SUSPICIOUS PULMONARY NODULES/MASSES IDENTIFIED.     HEART: MODERATE CARDIOMEGALY WITH MODERATE CORONARY VASCULAR  CALCIFICATIONS.     PERICARDIUM: No effusion.     MEDIASTINUM: No masses. No enlarged lymph nodes.  No fluid collections.     PLEURA: No pleural effusion. No pleural mass or abnormal calcification.  No pneumothorax.     MAJOR AIRWAYS: Clear. No intrinsic mass.     VASCULATURE: No evidence of aneurysm.     VISUALIZED UPPER ABDOMEN:SINCE THE PREVIOUS EXAM, THERE HAS BEEN  INTERVAL PLACEMENT OF BILIARY STENT WHICH LIKELY ACCOUNTS FOR PRESENCE  OF PNEUMOBILIA WITHIN THE LIVER.     OTHER: None.     BONES: DEGENERATIVE CHANGES THORACIC SPINE BUT NO ACUTE BONY FINDINGS.       Impression:       1. NO SIGNIFICANT CHANGE IN THE APPEARANCE OF THE CHEST AGAIN  DEMONSTRATING CHRONIC LUNG CHANGES WITH MILD BASILAR SUBPLEURAL  FIBROSIS.  2. STABLE CARDIOMEGALY.  3. INTERVAL PLACEMENT OF BILIARY STENT WHICH ACCOUNTS FOR PNEUMOBILIA.      This report was finalized on 12/10/2018 7:30 AM by Dr. Klever Paredes MD.       XR Chest PA & Lateral [[id number]] Collected:  12/09/18 1115     Updated:  12/09/18 1118    Narrative:       EXAMINATION: XR CHEST PA AND LATERAL-      CLINICAL INDICATION: r/o pneumonia; R50.9-Fever, unspecified          COMPARISON: 12/7/2018      TECHNIQUE: XR CHEST PA AND LATERAL-      FINDINGS:   PowerPort is stable in the superior vena cava   Atelectasis in the left lung base  Equivocal pneumonia in the right infrahilar region   No pneumothorax.   Bony and soft tissue structures are unremarkable.            Impression:       Right infrahilar consolidation     This report was finalized on 12/9/2018 11:16 AM by Dr. Ernesto Bell MD.               Results for orders placed during the hospital encounter of 10/22/17   Adult Transthoracic Echo Complete W/ Cont if Necessary Per Protocol    Narrative · Left ventricular systolic function is normal. Estimated EF = 60%.  · The cardiac valves are anatomically and functionally normal.            Assessment/Plan:   Severe sepsis on admission, although etiology is uncertain I do think there was an infectious process going on, he has defervesced and CRP is normalized.  They came in Thursday and with time he seems to be getting stronger.  At least part of this patient's decline I do feel like was an infectious process and we are continuing him on current antibiotics.    Pancreatic cancer, he is slightly improved today he has tolerated some of his diet and is participating with therapy.  I've discussed with oncology, will continue supporting him with TPN as well as nutrition as he tolerates continue therapy and hopefully the farther he gets away from the infectious process he will continue to improve enough to get to his possible [place] procedure.  I had discussed with Dr. Adamson yesterday and he did not feel like the patient at this point will benefit from going to [place] as he stated the  patient was still too weak to consider the surgery as this is a large surgery.  He was afraid patient would not survive the surgery and his current condition.  Palliative care been consulted for better nausea and pain control.    Diabetes, glucoses controlled    History of paroxysmal atrial fibrillation, in sinus rhythm, on Eliquis for stroke prevention.  Hemoglobin is stable.

## 2018-12-11 NOTE — PROGRESS NOTES
Discharge Planning Assessment   Jameel     Patient Name: Todd Phillips  MRN: [id number]  Today's Date: 12/11/2018    Admit Date: 12/6/2018        Discharge Plan     Row Name 12/11/18 1646       Plan    Plan  Pt has a Palliative Care consult. SS discussed pt with Dr. Thomson and reviewed her documentation. Palliative Care did not discuss pt's prognosis at this time. Pt lives with spouse and he plans to return home at discharge. Pt utilizes [facility]. [facility] [phone] to be contacted at discharge. Pt has a rolling walker and a shower chair at home. Pt utilizes [facility] for TPN. SS to follow.         Mercedes Castellanos

## 2018-12-11 NOTE — PLAN OF CARE
Problem: Patient Care Overview  Goal: Plan of Care Review  Outcome: Ongoing (interventions implemented as appropriate)   12/11/18 1434   Coping/Psychosocial   Plan of Care Reviewed With patient   Coping/Psychosocial   Patient Agreement with Plan of Care agrees   Plan of Care Review   Progress improving   OTHER   Outcome Summary Pt tolerated OT treatment well this date w/ rest as needed. Completed 4 BUE strengthening/endurance exercises x 10-15 reps each w/ hand over hand assistance as needed. Skilled OT to continue current POC as tolerated.

## 2018-12-11 NOTE — PROGRESS NOTES
Pharmacokinetics Service Note:    Day 5 of 7 cefepime  Day 5 of 11 micafungin  Day 4 of 10 vancomycin IV    Mr. Phillips's 15.75 hour post 2 hour infusion vancomycin trough level was reported as 22 mg/L today on his current regimen of 1.5 gm q18hrs.  This is higher than desired and consistent with some accumulation.  Will extend the dosage interval to q24hrs to target troughs = 15-20 mg/L.  Will repeat a trough level at steady state if treatment continues.      Thank you.  Cinthia Nettles, Pharm.D.  12/11/2018  9:44 AM

## 2018-12-11 NOTE — PROGRESS NOTES
PROGRESS NOTE         Patient Identification:  Name:  Todd Phillips  Age:  68 y.o.  Sex:  male  :  1950  MRN:  [id number]  Visit Number:  [id number]  Primary Care Provider:  Adiel Denney MD      ----------------------------------------------------------------------------------------------------------------------  Subjective       Chief Complaints:    Chest Pain and Fever        Interval History:      Patient more comfortable this morning. Improved nausea and abdominal pain. No fever or diarrhea. WBC normal. CRP improving at 0.53. Blood cultures from 18 show no growth at this time.    Review of Systems:    Constitutional: no fever, chills and night sweats. No appetite change or unexpected weight change. No fatigue.  Eyes: no eye drainage, itching or redness.  HEENT: no mouth sores, dysphagia or nose bleed.  Respiratory: no for shortness of breath, cough or production of sputum.  Cardiovascular: no chest pain, no palpitations, no orthopnea.  Gastrointestinal: Positive nausea, no vomiting or diarrhea. Positive abdominal pain, no hematemesis or rectal bleeding.  Genitourinary: no dysuria or polyuria.  Hematologic/lymphatic: no lymph node abnormalities, no easy bruising or easy bleeding.  Musculoskeletal: no muscle or joint pain.  Skin: No rash and no itching.  Neurological: no loss of consciousness, no seizure, no headache.  Behavioral/Psych: no depression or suicidal ideation.  Endocrine: no hot flashes.  Immunologic: negative.    ----------------------------------------------------------------------------------------------------------------------      Objective       Current Hospital Meds:    allopurinol 100 mg Oral Daily   apixaban 5 mg Oral Q12H   cefepime 2 g Intravenous Q8H   cholecalciferol 1,000 Units Oral Daily   colchicine 0.6 mg Oral Daily   dronabinol 5 mg Oral BID AC   fat emulsion 250 mL Intravenous Once per day on    And      trace minerals + multivitamin IVPB   Intravenous Once per day on Mon Wed Fri   flecainide 50 mg Oral Q12H   FLUoxetine 20 mg Oral BID   insulin aspart 0-7 Units Subcutaneous Q6H   linaclotide 145 mcg Oral QAM AC   magnesium sulfate 4 g Intravenous Once   micafungin (MYCAMINE)  mg Intravenous Q24H   mometasone 2 puff Inhalation Nightly   montelukast 10 mg Oral Daily   Morphine 30 mg Oral Q12H   ondansetron 8 mg Intravenous Q8H   pantoprazole 40 mg Oral QAM   promethazine (PHENERGAN) in NS 50 mL 12.5 mg Intravenous 4x Daily   sodium chloride 3 mL Intravenous Q12H   tiotropium bromide-olodaterol 2 puff Inhalation Daily   vancomycin 1,500 mg Intravenous Q24H       Adult [facility] TPN  Last Rate: 80 mL/hr at 12/10/18 1847   Pharmacy Consult       ----------------------------------------------------------------------------------------------------------------------    Vital Signs:  Temp:  [97.1 °F (36.2 °C)-98.2 °F (36.8 °C)] 98.1 °F (36.7 °C)  Heart Rate:  [70-86] 86  Resp:  [18-20] 20  BP: (118-152)/() 118/78  No data found.  SpO2 Percentage    12/11/18 0701 12/11/18 0710 12/11/18 1100   SpO2: 97% 97% 92%     SpO2:  [92 %-98 %] 92 %  on   ;   Device (Oxygen Therapy): room air    Body mass index is 26.73 kg/m².  Wt Readings from Last 3 Encounters:   12/10/18 89.4 kg (197 lb 2 oz)   12/04/18 88.4 kg (194 lb 12.8 oz)   11/27/18 90.7 kg (200 lb)        Intake/Output Summary (Last 24 hours) at 12/11/2018 1316  Last data filed at 12/11/2018 0300  Gross per 24 hour   Intake 240 ml   Output --   Net 240 ml     Adult [facility] TPN  Diet Regular; Consistent Carbohydrate  ----------------------------------------------------------------------------------------------------------------------    Physical exam:      Constitutional:  Well-developed and well-nourished.  No respiratory distress.      HENT:  Head: Normocephalic and atraumatic.  Mouth:  Moist mucous membranes.    Eyes:  Conjunctivae and EOM are normal.  No scleral icterus.  Neck:   Neck supple.  No JVD present.    Cardiovascular:  Normal rate, regular rhythm and normal heart sounds with no murmur. No edema.  Pulmonary/Chest:  No respiratory distress, no wheezes, no crackles, with normal breath sounds and good air movement.  Abdominal:  Soft.  Bowel sounds are normal.  No distension. Positive tenderness.  Musculoskeletal:  No edema, no tenderness, and no deformity.  No swelling or redness of joints.  Neurological:  Alert and oriented to person, place, and time.  No facial droop.  No slurred speech.   Skin:  Skin is warm and dry.  No rash noted.  No pallor.   Psychiatric:  Normal mood and affect.  Behavior is normal.    ----------------------------------------------------------------------------------------------------------------------  I have personally reviewed the EKG/Telemetry strips   ----------------------------------------------------------------------------------------------------------------------  Results from last 7 days   Lab Units  12/06/18   2305   TROPONIN I ng/mL  0.007       Results from last 7 days   Lab Units  12/07/18   0705   TRIGLYCERIDES mg/dL  127     Results from last 7 days   Lab Units  12/06/18   2319   PH, ARTERIAL pH units  7.530*   PO2 ART mm Hg  74.8*   PCO2, ARTERIAL mm Hg  29.7*   HCO3 ART mmol/L  24.3     Results from last 7 days   Lab Units  12/11/18   0435  12/10/18   0453  12/09/18   0440  12/09/18   0412  12/08/18   0816   12/07/18   1141  12/07/18   0705  12/06/18   2305   CRP mg/dL  0.53  0.67  1.05*   --    --    --    --   3.15*  2.00*   LACTATE mmol/L   --    --    --    --   2.0   --   2.1*  2.5*  1.3   WBC 10*3/mm3  7.97  8.18   --   7.62   --    < >   --   10.68  10.72   HEMOGLOBIN g/dL  10.7*  10.5*   --   10.2*   --    < >   --   11.3*  11.3*   HEMATOCRIT %  32.8*  33.4*   --   31.9*   --    < >   --   34.6*  33.9*   MCV fL  90.1  92.5   --   91.4   --    < >   --   91.3  89.4   MCHC g/dL  32.6*  31.4*   --   32.0*   --    < >   --   32.7*  33.3    PLATELETS 10*3/mm3  197  217   --   240   --    < >   --   282  323   INR    --    --    --    --    --    --    --    --   1.56*    < > = values in this interval not displayed.     Results from last 7 days   Lab Units  12/11/18   0435  12/10/18   0453  12/09/18   0412  12/08/18   0325   12/06/18   2305   SODIUM mmol/L  134*  135  135  130*   < >  132*   POTASSIUM mmol/L  4.0  4.4  4.8  4.4   < >  4.2   MAGNESIUM mg/dL  1.8   --   2.0  2.6   < >   --    CHLORIDE mmol/L  101  101  103  100   < >  99   CO2 mmol/L  27.6  27.0  23.3*  24.8   < >  24.0*   BUN mg/dL  28*  32*  33*  30*   < >  33*   CREATININE mg/dL  1.27  1.22  1.25  1.20   < >  1.27   EGFR IF NONAFRICN AM mL/min/1.73  56*  59*  57*  60*   < >  56*   CALCIUM mg/dL  9.2  8.7  8.8  9.1   < >  9.1   GLUCOSE mg/dL  143*  134*  127*  251*   < >  172*   ALBUMIN g/dL  3.60   --   3.20*   --    --   3.80   BILIRUBIN mg/dL  0.2   --   0.2   --    --   0.4   ALK PHOS U/L  83   --   77   --    --   102   AST (SGOT) U/L  18   --   22   --    --   18   ALT (SGPT) U/L  13   --   10   --    --   18    < > = values in this interval not displayed.   Estimated Creatinine Clearance: 68.4 mL/min (by C-G formula based on SCr of 1.27 mg/dL).  No results found for: AMMONIA    Hemoglobin A1C   Date/Time Value Ref Range Status   12/08/2018 1513 7.00 (H) 4.50 - 5.70 % Final     Glucose   Date/Time Value Ref Range Status   12/11/2018 1056 159 (H) 70 - 130 mg/dL Final   12/11/2018 0528 141 (H) 70 - 130 mg/dL Final   12/10/2018 2306 145 (H) 70 - 130 mg/dL Final   12/10/2018 1639 141 (H) 70 - 130 mg/dL Final   12/10/2018 1057 156 (H) 70 - 130 mg/dL Final   12/10/2018 0726 139 (H) 70 - 130 mg/dL Final   12/09/2018 2227 133 (H) 70 - 130 mg/dL Final   12/09/2018 1642 160 (H) 70 - 130 mg/dL Final     Lab Results   Component Value Date    HGBA1C 7.00 (H) 12/08/2018     Lab Results   Component Value Date    TSH 1.502 11/18/2018    FREET4 1.14 10/30/2018       Blood Culture   Date Value  Ref Range Status   12/06/2018 No growth at 3 days  Preliminary   12/06/2018 No growth at 3 days  Preliminary     Urine Culture   Date Value Ref Range Status   12/06/2018 No growth  Final              Pain Management Panel     Pain Management Panel Latest Ref Rng & Units 10/16/2018 9/12/2018    CREATININE UR mg/dL 208.8 -    AMPHETAMINES SCREEN, URINE Negative - Negative    BARBITURATES SCREEN Negative - Negative    BENZODIAZEPINE SCREEN, URINE Negative - Negative    BUPRENORPHINE Negative - Negative    COCAINE SCREEN, URINE Negative - Negative    METHADONE SCREEN, URINE Negative - Negative          I have personally reviewed the above laboratory results.   ----------------------------------------------------------------------------------------------------------------------  Imaging Results (last 24 hours)     ** No results found for the last 24 hours. **        I have personally reviewed the above radiology results.   ----------------------------------------------------------------------------------------------------------------------    Assessment/Plan       Assessment/Plan     ASSESSMENT:    1. Severe sepsis with lactic acid greater than 2 on admission  2. Fever of unknown origin  3. Immunosupression    PLAN:    Patient more comfortable this morning. Improved nausea and abdominal pain. No fever or diarrhea. WBC normal. CRP improving at 0.53. Blood cultures from 12/6/18 show no growth at this time.    Mycoplasma negative. Legionella negative. Influenza negative. Urine culture from 12/6/18 finalized as no growth. Blood cultures from 12/6/18 show no growth at this time. Lactic acid 2.0, improved from 2.5 on admission.     Chest x-ray from 12/9/18 reveals right infrahilar consolidation. KUB from 12/7/18 unremarkable.      Patient had a prior episode of bacteremia and is at very high risk for relapsing bacteremia.     In the setting of risk of relapsing bacteremia vancomycin pharmacy to dose, Cefepime 2gm IV Q12H , and  Micafungin 100 mg IV q 24 hrs was initiated.     At any time patient can be discharged. We recommend to continue current regimen until discharge with no antibiotics needed up on discharge due to patient's immunosuppression. CRP in AM.     Current Antimicrobials:  Vancomycin pharmacy to dose  Cefepime 2gm IV Q12H  Micafungin 100mg IV Q24H        Code Status:   Code Status and Medical Interventions:   Ordered at: 12/07/18 0635     Code Status:    CPR     Medical Interventions (Level of Support Prior to Arrest):    Full       Coni Fuller, VIMAL  12/11/18  1:16 PM

## 2018-12-11 NOTE — PROGRESS NOTES
Date:  12/11/18    CC: Nausea    Interval History: Mr. Phillips is resting in bed this morning. He continues to complain of fatigue and weakness. His main complaint today is nausea. As an outpatient, he had been using Sancuso patch along with scheduled compazine and Ativan every 4 hours. While inpatient, he has been receiving zofran and phenergan prn and says his symptoms have not been controlled. He does feel like phenergan has been working better than some of the other medications and would like to continue with this. He reports pain is controlled with current medications. He continues to receive TPN. He is worried about missing appointment with Dr. Hale tomorrow. He denies any other complaints at this time.     Review of Systems  A comprehensive 14 point review of systems was performed.  Significant findings as mentioned above.  All other systems reviewed and are negative.     Objective     Vital Signs  Vitals:    12/11/18 1100   BP: 118/78   Pulse: 86   Resp: 20   Temp: 98.1 °F (36.7 °C)   SpO2: 92%      Physical Exam:  General: Ill appearing and fatigued. Alert and oriented, in no acute distress.   HEENT:  Pupils are equal, round and reactive to light and accommodation, Extra-ocular movements full, Oropharyx clear, mmm  Neck:  No JVD, thyromegaly or lymphadenopathy  CV:  Regular rate/rhythm, no murmurs, rubs or gallops  Resp: Lungs are clear to auscultation bilaterally  Abd:  Soft, sl tender to palpation bi over the epigastric and RUQ areas, non-distended, bowel sounds normal, no organomegaly or masses.  Surgical incisions well-healed.  Ext:  No clubbing, cyanosis or edema  Lymph:  No cervical, supraclavicular, axillary,adenopathy  Neuro: grossly non-focal exam    Labs / Studies:  Lab Results   Component Value Date    WBC 7.97 12/11/2018    HGB 10.7 (L) 12/11/2018    HCT 32.8 (L) 12/11/2018    MCV 90.1 12/11/2018    RDW 16.2 (H) 12/11/2018     12/11/2018    NEUTRORELPCT 56.1 12/11/2018     LYMPHORELPCT 19.8 12/11/2018    MONORELPCT 14.4 (H) 12/11/2018    EOSRELPCT 5.4 12/11/2018    BASORELPCT 0.4 12/11/2018    NEUTROABS 4.47 12/11/2018    LYMPHSABS 1.58 12/11/2018       Lab Results   Component Value Date     (L) 12/11/2018    K 4.0 12/11/2018    CO2 27.6 12/11/2018     12/11/2018    BUN 28 (H) 12/11/2018    CREATININE 1.27 12/11/2018    EGFRIFNONA 56 (L) 12/11/2018    EGFRIFAFRI  09/02/2016      Comment:      <15 Indicative of kidney failure.    GLUCOSE 143 (H) 12/11/2018    CALCIUM 9.2 12/11/2018    ALKPHOS 83 12/11/2018    AST 18 12/11/2018    ALT 13 12/11/2018    BILITOT 0.2 12/11/2018    ALBUMIN 3.60 12/11/2018    PROTEINTOT 6.2 12/11/2018    MG 1.8 12/11/2018    PHOS 4.4 12/11/2018     Lab Results   Component Value Date    FERRITIN 367.00 (H) 09/07/2018    IRON 93 09/07/2018    TIBC 297 09/07/2018    LABIRON 31 09/07/2018    HVMEXSMG16 677 09/07/2018    FOLATE 22.81 (H) 09/07/2018     Imaging:  Ct Abdomen Pelvis Without Contrast    Result Date: 11/17/2018  EXAM:  CT Abdomen and Pelvis Without Intravenous Contrast EXAM DATE/TIME:  11/17/2018 1:43 AM CLINICAL HISTORY:  68 years old, male; Pain; Abdominal pain; Generalized; Prior surgery; Patient HX: Alan has had sepsis several times in the past 2/2 a malignant neoplasm of the head of his pancreas blocking off his bile duct. He has a HX of copd, hld, HTN, dm, a-fib, and cad; Additional info: Abdominal pain, fever, SOA TECHNIQUE:  Axial computed tomography images of the abdomen and pelvis without intravenous contrast.  All CT scans at this facility use at least one of these dose optimization techniques: automated exposure control; mA and/or kV adjustment per patient size (includes targeted exams where dose is matched to clinical indication); or iterative reconstruction.  Coronal reformatted images were created and reviewed. COMPARISON:  CT ABDOMEN PELVIS STONE PROTOCOL 8/13/2018 7:19 AM FINDINGS:  Lower thorax:  No suspicious mass or  airspace process in the visualized lung bases. ABDOMEN:  Liver:  Noncontrast liver shows no obvious lesion. Pneumobilia is consistent with prior sphincterotomy.  Gallbladder and bile ducts:  Gallbladder is surgically absent. Biliary stent extends from the distal common bile duct through the pancreatic head and ampulla level.  Pancreas:  Pancreatic body and tail are unremarkable for noncontrast CT fullness in the pancreatic head with peripancreatic stranding suggests a mass. The stent is new.  Spleen:  Noncontrast spleen shows no obvious focal deformity.  Adrenals:  Adrenal glands are normal in appearance.  Kidneys and ureters:  Kidneys show no stone or hydronephrosis.  Stomach and bowel:  No evidence of small bowel obstruction. No evidence of acute diverticulitis. Moderate pattern of colonic stool is present.  Appendix:  Normal caliber appendix is identified, with no adjacent inflammation. PELVIS:  Bladder:  Bladder appears normal.  Reproductive: Unremarkable as visualized. ABDOMEN and PELVIS:  Intraperitoneal space:  No pneumoperitoneum.  Bones/joints:  Degenerative changes are seen in the lumbar spine with disc height loss.  Soft tissues: Unremarkable.  Vasculature:  Atherosclerotic change present in the aorta, without aneurysm.  Lymph nodes: Normal. No enlarged lymph nodes.  Other findings:  Limited evaluation without enteric or IV contrast.     1. Interval pancreatic stent extending from the common bile duct into the ampulla without significant bile duct dilatation. Underlying apparent pancreatic head mass. 2. Small hiatal hernia which can be associated with abdominal pain in the setting of GE reflux THIS DOCUMENT HAS BEEN ELECTRONICALLY SIGNED BY DANETTE TODD MD    Xr Abdomen Flat & Upright    Result Date: 12/7/2018  XR ABDOMEN FLAT AND UPRIGHT-  CLINICAL INDICATION: constipation     COMPARISON: None available.  FINDINGS: Chest: The included view of the chest demonstrates the lungs to be well aerated. There  is no focal alveolar infiltrate or pleural effusion. The cardiac silhouette is normal. No acute osseous abnormality is seen within the chest.  Abdomen: Two views of the abdomen show no free air. I do not see abnormal bowel distention to suggest complete obstruction. The bony structures are intact. No abnormal soft tissue masses are seen. No suspicious appearing calcifications are identified on today's exam.      1. Moderate stool in the colon. 2. The lungs are clear. 3. No evidence of bowel obstruction. 4. No free air.        This report was finalized on 12/7/2018 10:00 AM by Dr. Ernesto Bell MD.      Ct Head Without Contrast    Result Date: 12/7/2018  CT HEAD WITHOUT CONTRAST-  CLINICAL INDICATION: confusion     COMPARISON: 06/20/2017  TECHNIQUE: Axial images of the brain were obtained without intravenous contrast.  Reformatted images were created in the sagittal and coronal planes.  DOSE: 746.31 mGy.cm  Radiation dose reduction techniques were utilized per ALARA protocol. Automated exposure control was initiated through either or [email or web address] or [employer] software packages by  protocol.     FINDINGS: Today's study shows no mass, hemorrhage, or midline shift. The ventricles, cisterns, and sulci are unremarkable. There is no hydrocephalus. There is no evidence of acute ischemia. I do not see epidural or subdural hematoma. The gray-white differentiation is appropriate. The bone window setting images show no destructive calvarial lesion or acute calvarial fracture. The posterior fossa is unremarkable.          No acute intracranial pathology. Nothing is seen on this exam to specifically account for the patient's symptoms.  This report was finalized on 12/7/2018 9:48 AM by Dr. Ernesto Bell MD.      Ct Head Without Contrast    Result Date: 11/17/2018  EXAM:  CT Head Without Intravenous Contrast EXAM DATE/TIME:  11/17/2018 4:14 AM CLINICAL HISTORY:  68 years old, male; Altered mental status, unspecified; Other  specified personal risk factors, not elsewhere classified; Signs and symptoms; Altered mental status/memory loss; Additional info: AMS TECHNIQUE:  Axial computed tomography images of the head/brain without intravenous contrast.  All CT scans at this facility use at least one of these dose optimization techniques: automated exposure control; mA and/or kV adjustment per patient size (includes targeted exams where dose is matched to clinical indication); or iterative reconstruction. COMPARISON:  No relevant prior studies available. FINDINGS:  Brain:  No hemorrhage. No significant white matter disease. No edema.  Ventricles: Normal. No ventriculomegaly.  Bones/joints: Normal. No acute fracture.  Sinuses: Normal as visualized. No acute sinusitis.  Mastoid air cells: Normal as visualized. No mastoid effusion.  Soft tissues: Normal.     No acute infarct, hemorrhage or mass THIS DOCUMENT HAS BEEN ELECTRONICALLY SIGNED BY JOYA REBOLLEDO MD    Ct Chest Without Contrast    Result Date: 12/10/2018  EXAM: CT CHEST WO CONTRAST-      CLINICAL INDICATION:R/O pneumonia. XRAY with consolidation; R50.9-Fever, unspecified  COMPARISON: COMPARISON: 9/11/2018  TECHNIQUE: Multiple axial CT images were obtained from lung apex through upper abdomen WITHOUT administration of IV contrast. Reformatted images in the coronal and/or sagittal plane(s) were generated from the axial data set to facilitate diagnostic accuracy and/or surgical planning. Oral Contrast:NONE.  Radiation dose reduction techniques were utilized per ALARA protocol. Automated exposure control was initiated through either or [employer] or [employer] software packages by  protocol.  DOSE (DLP mGy-cm): 516.01 mGy.cm   FINDINGS:  LUNGS: CHRONIC APPEARING INTERSTITIAL LUNG CHANGES ARE NOTED WITH UNDERLYING MILD CENTRILOBULAR EMPHYSEMA. SUBPLEURAL AND PERIPHERAL FIBROSIS OF THE LOWER LUNG ZONES NOTED. NO AIRSPACE CONSOLIDATIONS OR SUSPICIOUS PULMONARY NODULES/MASSES  IDENTIFIED.  HEART: MODERATE CARDIOMEGALY WITH MODERATE CORONARY VASCULAR CALCIFICATIONS.  PERICARDIUM: No effusion.  MEDIASTINUM: No masses. No enlarged lymph nodes.  No fluid collections.  PLEURA: No pleural effusion. No pleural mass or abnormal calcification. No pneumothorax.  MAJOR AIRWAYS: Clear. No intrinsic mass.  VASCULATURE: No evidence of aneurysm.  VISUALIZED UPPER ABDOMEN:SINCE THE PREVIOUS EXAM, THERE HAS BEEN INTERVAL PLACEMENT OF BILIARY STENT WHICH LIKELY ACCOUNTS FOR PRESENCE OF PNEUMOBILIA WITHIN THE LIVER.  OTHER: None.  BONES: DEGENERATIVE CHANGES THORACIC SPINE BUT NO ACUTE BONY FINDINGS.      1. NO SIGNIFICANT CHANGE IN THE APPEARANCE OF THE CHEST AGAIN DEMONSTRATING CHRONIC LUNG CHANGES WITH MILD BASILAR SUBPLEURAL FIBROSIS. 2. STABLE CARDIOMEGALY. 3. INTERVAL PLACEMENT OF BILIARY STENT WHICH ACCOUNTS FOR PNEUMOBILIA.  This report was finalized on 12/10/2018 7:30 AM by Dr. Klever Paredes MD.      Ct Chest Without Contrast    Result Date: 11/17/2018  EXAM:  CT Chest Without Contrast EXAM DATE/TIME:  11/17/2018 1:44 AM CLINICAL HISTORY:  68 years old, male; Pain; Chest pain; Additional info: Abdominal pain, fever, SOA TECHNIQUE:  Axial computed tomography images of the chest without intravenous contrast. All CT scans at this facility use at least one of these dose optimization techniques: automated exposure control; mA and/or kV adjustment per patient size (includes targeted exams where dose is matched to clinical indication); or iterative reconstruction. Coronal reformatted images were created and reviewed. COMPARISON:  CR XR CHEST 1 VW 11/17/2018 12:07 AM FINDINGS: Limited evaluation without IV contrast. Thoracic aorta is tortuous without focal aneurysm or dissection. No pleural effusion or pneumothorax. No enlarged lymph nodes. Pulmonary vascular and interstitial pattern does not suggest active failure. No suspicious lung mass or air space process. No central endobronchial lesion. Bony  structures show no acute fracture or destructive process.     Unremarkable noncontrast CT of the chest. THIS DOCUMENT HAS BEEN ELECTRONICALLY SIGNED BY DANETTE TODD MD    Xr Chest 1 View    Result Date: 12/7/2018  XR CHEST 1 VIEW-  CLINICAL INDICATION: fever     COMPARISON: 10/30/2018  TECHNIQUE: Single frontal view of the chest.  FINDINGS:  Left basilar atelectasis. The cardiac silhouette is normal. The pulmonary vasculature is unremarkable. There is no evidence of an acute osseous abnormality. There are no suspicious-appearing parenchymal soft tissue nodules.         Left basilar atelectasis.     This report was finalized on 12/7/2018 10:07 AM by Dr. Ernesot Bell MD.      Xr Chest 1 View    Result Date: 11/17/2018  EXAM:   XR Chest, 1 View CLINICAL HISTORY:   68 years old, male; Signs and symptoms; Fever; Prior surgery; Surgery date: 1-6 months; Surgery type: Port for chemo; Additional info: Chest pain triage protocol TECHNIQUE:   Frontal view of the chest. COMPARISON:   CR XR CHEST 1 VW 10/17/2018 4:12 PM FINDINGS:   Lungs: No acute lobar consolidation.   Pleural space:  Unremarkable.  No pneumothorax.   Heart:  Unremarkable.  No cardiomegaly.   Mediastinum:  Ovoid collection of air projecting over the left lung base, new from prior exam, suspect air in transverse colon or gastric hiatal hernia.   Bones/joints:  Unremarkable.   Vasculature:  Tortuous aorta.   Tubes, lines and devices:  New right-sided chest port.     1.  Ovoid collection of air projecting over the left lung base, new from prior exam, suspect air in transverse colon or gastric hiatal hernia. 2.  New right-sided chest port. THIS DOCUMENT HAS BEEN ELECTRONICALLY SIGNED BY WINDY JOHNSON MD    Xr Chest Pa & Lateral    Result Date: 12/9/2018  EXAMINATION: XR CHEST PA AND LATERAL-  CLINICAL INDICATION: r/o pneumonia; R50.9-Fever, unspecified     COMPARISON: 12/7/2018  TECHNIQUE: XR CHEST PA AND LATERAL-  FINDINGS: PowerPort is stable in the superior  vena cava Atelectasis in the left lung base Equivocal pneumonia in the right infrahilar region No pneumothorax. Bony and soft tissue structures are unremarkable.         Right infrahilar consolidation  This report was finalized on 12/9/2018 11:16 AM by Dr. Ernesto Bell MD.      Xr Chest Pa & Lateral    Result Date: 12/7/2018  EXAMINATION: XR CHEST PA AND LATERAL-  CLINICAL INDICATION: fever, cough     COMPARISON: 12/6/2018  TECHNIQUE: XR CHEST PA AND LATERAL-  FINDINGS: PowerPort stable in the superior vena cava Heart and mediastinal contours are unremarkable. No pneumothorax. Bony and soft tissue structures are unremarkable.         No radiographic evidence of acute cardiac or pulmonary disease.  This report was finalized on 12/7/2018 3:06 PM by Dr. Ernesto Bell MD.      Assessment & Plan:  Todd Phillips is a 68 y.o. year-old male with     1.  Pancreatic Cancer:   -In regards to his oncology history, please see most recent follow up note dated 12/4/18 for full details.   -Recently started on neoadjuvant treatment with Gemzar/Abraxane and completed his first cycle in mid-November. -Unfortunately, he has been struggling with ongoing nausea/vomiting and dehydration. As a result, he has very poor nutrition and his symptoms have made it very difficult to treat him. We started him on TPN per Dr. Hale's recommendations a couple of weeks ago which had been very helpful. We saw him in clinic last week and overall, he was feeling much better. Chemotherapy has been on hold and plan was to follow up with Dr. Hale tomorrow with repeat imaging and possibly schedule surgery at that time. Unfortunately, he is now admitted with sepsis. Will continue with supportive care for now and continue to closely monitor. Will discuss recommendations/goals of care further with Dr. Alves.    2. Nausea:  -Currently uncontrolled. As an outpatient, he had been using Sancuso patch along with scheduled compazine and Ativan every 4 hours.  While inpatient, he has been receiving zofran and phenergan prn. He does feel like phenergan has been working better than some of the other medications and would like to continue with this. Therefore, for now, will change his medications to scheduled zofran and phenergan. Will continue to monitor.     3. Neoplasm related pain:  -Currently controlled with MSContin 30 mg BID and oxycodone prn. Will monitor.     4. Sepsis:   -Being managed by primary team and ID.    Electronically signed by: VIMAL Nielson , December 11, 2018 2:59 PM

## 2018-12-11 NOTE — PLAN OF CARE
Problem: Patient Care Overview  Goal: Individualization and Mutuality  Outcome: Ongoing (interventions implemented as appropriate)    Goal: Discharge Needs Assessment  Outcome: Ongoing (interventions implemented as appropriate)    Goal: Interprofessional Rounds/Family Conf  Outcome: Ongoing (interventions implemented as appropriate)      Problem: Patient Care Overview  Goal: Individualization and Mutuality  Outcome: Ongoing (interventions implemented as appropriate)    Goal: Discharge Needs Assessment  Outcome: Ongoing (interventions implemented as appropriate)    Goal: Interprofessional Rounds/Family Conf  Outcome: Ongoing (interventions implemented as appropriate)      Problem: Self-Care Deficit (Adult,Obstetrics,Pediatric)  Goal: Identify Related Risk Factors and Signs and Symptoms  Outcome: Ongoing (interventions implemented as appropriate)    Goal: Improved Ability to Perform BADL and IADL  Outcome: Ongoing (interventions implemented as appropriate)      Problem: Functional Mobility Impairment (IRF) (Adult)  Goal: Optimal/Safe Level of [place] with Mobility  Outcome: Ongoing (interventions implemented as appropriate)

## 2018-12-11 NOTE — THERAPY TREATMENT NOTE
Acute Care - Occupational Therapy Treatment Note   Jameel     Patient Name: Todd Phillips  : 1950  MRN: [id number]  Today's Date: 2018  Onset of Illness/Injury or Date of Surgery: 18     Referring Physician: Dr. Valdez    Admit Date: 2018       ICD-10-CM ICD-9-CM   1. Fever, unspecified fever cause R50.9 780.60     Patient Active Problem List   Diagnosis   • Hyperlipidemia   • COPD (chronic obstructive pulmonary disease) (CMS/Formerly [facility])   • Essential hypertension   • Gout without tophus   • Anxiety and depression   • Primary insomnia   • Gastroesophageal reflux disease without esophagitis   • Nonobstructive atherosclerosis of coronary artery   • CKD stage 3 secondary to diabetes (CMS/Formerly [facility])   • DM2 (diabetes mellitus, type 2) (CMS/Formerly [facility])   • Paroxysmal atrial fibrillation   • Chronic anticoagulation, eliquis   • Encounter for monitoring flecainide therapy   • Malignant neoplasm of head of pancreas (CMS/Formerly [facility])   • Bacteremia due to Escherichia coli   • Biliary obstruction   • Thrombocytopenia (CMS/Formerly [facility])   • Anemia due to chemotherapy   • Fever     Past Medical History:   Diagnosis Date   • Antral gastritis    • Asthma    • Atrial fibrillation (CMS/HCC)     treated with oral blood thinner   • Chest pain    • COPD (chronic obstructive pulmonary disease) (CMS/Formerly [facility])    • Coronary artery disease     no stents   • Diabetes mellitus (CMS/HCC)     type 2    • Duodenitis    • Elevated cholesterol    • Emphysema of lung (CMS/HCC)    • Gallbladder disease     removed    • GERD (gastroesophageal reflux disease)    • Hyperlipidemia    • Hypertension    • Jaundice    • Kidney stone    • Kidney stones     had lithotripsy and passed 36 kidney stones as well as had one surgically removed.   • Malignant neoplasm of head of pancreas (CMS/HCC) 2018   • Palpitations    • Pneumonia 2018   • Reflux esophagitis    • Renal failure     stage 3 kidney disease   • Sepsis (CMS/HCC)      Past Surgical History:   Procedure  Laterality Date   • BILE DUCT STENT PLACEMENT      [facility] 2 weeks ago    • CARDIAC CATHETERIZATION  11/01/1999   • CARDIOVASCULAR STRESS TEST  09/2012   • COLONOSCOPY     • CYSTOSCOPY BLADDER STONE LITHOTRIPSY     • ECHO - CONVERTED  09/2012   • KIDNEY STONE SURGERY     • KIDNEY STONE SURGERY      open abdominal surgery   • UPPER GASTROINTESTINAL ENDOSCOPY  08/30/2012       Therapy Treatment    Rehabilitation Treatment Summary     Row Name 12/11/18 1429 12/11/18 1123          Treatment Time/Intention    Discipline  occupational therapist  -KH  physical therapy assistant  -KB     Document Type  therapy note (daily note)  -  therapy note (daily note)  -KB     Subjective Information  no complaints  -  no complaints  -KB     Mode of Treatment  occupational therapy;individual therapy  -  individual therapy;physical therapy  -KB     Patient/Family Observations  Pt and family in room and agreeable to OT treatment  -  Pt. in supine with no apparent signs of distress; agreeable to PT.  -KB     Therapy Frequency (PT Clinical Impression)  --  other (see comments) 3-5 times/ week per priority policy  -KB     Patient Effort  good  -  good  -KB     Existing Precautions/Restrictions  fall  -  fall  -KB     Patient Response to Treatment  Pt tolerated OT treatment well this date w/ rest as needed  -  Pt. tolerated tx well with no adverse effects noted.  -KB     Recorded by [KH] Tabatha Myles, OT 12/11/18 1434 [KB] Sully Jauregui, PTA 12/11/18 1130     Row Name 12/11/18 1429 12/11/18 1123          Cognitive Assessment/Intervention- PT/OT    Orientation Status (Cognition)  oriented x 3  -KH  oriented x 3  -KB     Follows Commands (Cognition)  follows one step commands;WFL  -KH  follows one step commands;WFL  -KB     Recorded by [KH] Tabatha Myles, OT 12/11/18 1434 [KB] Sully Jauregui, PTA 12/11/18 1130     Row Name 12/11/18 1123             Safety Issues, Functional Mobility     Safety Issues Affecting Function (Mobility)  awareness of need for assistance;insight into deficits/self awareness;safety precaution awareness;safety precautions follow-through/compliance  -KB      Impairments Affecting Function (Mobility)  balance;coordination;endurance/activity tolerance;pain;strength  -KB      Recorded by [KB] Sully Jauregui, PTA 12/11/18 1130      Row Name 12/11/18 1123             Mobility Assessment/Intervention    Extremity Weight-bearing Status  left lower extremity;right lower extremity  -KB      Left Lower Extremity (Weight-bearing Status)  weight-bearing as tolerated (WBAT)  -KB      Right Lower Extremity (Weight-bearing Status)  weight-bearing as tolerated (WBAT)  -KB      Recorded by [KB] Sully Jauregui, PTA 12/11/18 1130      Row Name 12/11/18 1123             Bed Mobility Assessment/Treatment    Bed Mobility Assessment/Treatment  supine-sit  -KB      Supine-Sit [place] (Bed Mobility)  minimum assist (75% patient effort);nonverbal cues (demo/gesture);verbal cues  -KB      Bed Mobility, Safety Issues  decreased use of arms for pushing/pulling;decreased use of legs for bridging/pushing  -KB      Assistive Device (Bed Mobility)  bed rails  -KB      Recorded by [KB] Sully Jauregui, PTA 12/11/18 1130      Row Name 12/11/18 1123             Transfer Assessment/Treatment    Transfer Assessment/Treatment  sit-stand transfer;stand-sit transfer  -KB      Maintains Weight-bearing Status (Transfers)  able to maintain  -KB      Recorded by [KB] Sully Jauregui, PTA 12/11/18 1130      Row Name 12/11/18 1123             Sit-Stand Transfer    Sit-Stand [place] (Transfers)  minimum assist (75% patient effort);nonverbal cues (demo/gesture);verbal cues  -KB      Assistive Device (Sit-Stand Transfers)  walker, front-wheeled  -KB      Recorded by [KB] Sully Jauregui, PTA 12/11/18 1130      Row Name 12/11/18 1123             Stand-Sit Transfer    Stand-Sit [place] (Transfers)  minimum  assist (75% patient effort);nonverbal cues (demo/gesture);verbal cues  -KB      Assistive Device (Stand-Sit Transfers)  walker, front-wheeled  -KB      Recorded by [KB] Sully Jauregui, PTA 12/11/18 1130      Row Name 12/11/18 1123             Gait/Stairs Assessment/Training    03285 - Gait Training Minutes   15  -KB      Gait/Stairs Assessment/Training  gait/ambulation independence;gait/ambulation assistive device;distance ambulated;gait pattern;gait deviations;maintains weight-bearing status  -KB      [place] Level (Gait)  minimum assist (75% patient effort);nonverbal cues (demo/gesture);verbal cues  -KB      Assistive Device (Gait)  walker, front-wheeled  -KB      Distance in Feet (Gait)  150  -KB      Pattern (Gait)  step-through  -KB      Deviations/Abnormal Patterns (Gait)  bobby decreased;gait speed decreased  -KB      Recorded by [KB] Sully Jauregui, PTA 12/11/18 1130      Row Name 12/11/18 1123             Motor Skills Assessment/Interventions    Additional Documentation  Therapeutic Exercise (Group)  -KB      Recorded by [KB] Sully Jauregui, PTA 12/11/18 1130      Row Name 12/11/18 1429 12/11/18 1123          Therapeutic Exercise    Therapeutic Exercise  supine, upper extremities  -KH  seated, lower extremities  -KB     Additional Documentation  --  Therapeutic Exercise (Row)  -KB     24468 - PT Therapeutic Exercise Minutes  --  10  -KB     Recorded by [KH] Tabatha Myles, OT 12/11/18 1434 [KB] Sully Jauregui, PTA 12/11/18 1130     Row Name 12/11/18 1429             Upper Extremity Supine Therapeutic Exercise    Performed, Supine Upper Extremity (Therapeutic Exercise)  shoulder flexion/extension;elbow flexion/extension;shoulder/scapular protraction/retraction  -      Device, Supine Upper Extremity (Therapeutic Exercise)  elastic bands/tubing weighted dowel  -      Exercise Type, Supine Upper Extremity (Therapeutic Exercise)  AAROM (active assistive range of motion);AROM (active  range of motion) BUE GMC, BUE TherEx/Act  -KH      Expected Outcomes, Supine Upper Extremity (Therapeutic Exercise)  improve functional tolerance, self-care activity;improve performance, BADLs  -      Sets/Reps Detail, Supine Upper Extremity (Therapeutic Exercise)  4 BUE strengthening/endurance exercises x 10-15 reps each w/ hand over hand assistance as needed  -      Recorded by [KH] Tabatha Myles, OT 12/11/18 1434      Row Name 12/11/18 1123             Lower Extremity Seated Therapeutic Exercise    Performed, Seated Lower Extremity (Therapeutic Exercise)  LAQ (long arc quad), knee extension;ankle dorsiflexion/plantarflexion;hip abduction/adduction;hip flexion/extension  -KB      Device, Seated Lower Extremity (Therapeutic Exercise)  other (see comments) pillow  -KB      Exercise Type, Seated Lower Extremity (Therapeutic Exercise)  AROM (active range of motion)  -KB      Expected Outcomes, Seated Lower Extremity (Therapeutic Exercise)  improve functional stability  -KB      Sets/Reps Detail, Seated Lower Extremity (Therapeutic Exercise)  1x10  -KB      Transfers Skills, Training to Functional Activity, Seated Lower Extremity (Therapeutic Exercise)  beginning to transfer skills to functional activity  -KB      Recorded by [KB] Sully Jauregui, PTA 12/11/18 1130      Row Name 12/11/18 1429 12/11/18 1123          Positioning and Restraints    Pre-Treatment Position  in bed  -KH  in bed  -KB     Post Treatment Position  bed  -KH  chair  -KB     In Bed  supine;call light within reach;encouraged to call for assist;with family/caregiver;side rails up x2  -KH  --     In Chair  --  sitting;call light within reach;encouraged to call for assist;with family/caregiver  -KB     Recorded by [KH] Tabatha Myles, OT 12/11/18 1434 [KB] Sully Jauregui, PTA 12/11/18 1130     Row Name 12/11/18 1123             Pain Scale: Numbers Pre/Post-Treatment    Pain Location - Orientation  --  -KB      Pain Location   --  -KB      Recorded by [KB] [place], Sully L, PTA 12/11/18 1130      Row Name 12/11/18 1123             Pain Scale: FACES Pre/Post-Treatment    Pain: FACES Scale, Pretreatment  --  -KB      Pain: FACES Scale, Post-Treatment  --  -KB      Recorded by [KB] Sully Jauregui, PTA 12/11/18 1130      Row Name 12/11/18 1123             Sensory Assessment/Intervention    Sensory General Assessment  no sensation deficits identified  -KB      Recorded by [KB] Sully Jauregui, PTA 12/11/18 1130      Row Name 12/11/18 1123             Vision Assessment/Intervention    Visual Impairment/Limitations  WFL  -KB      Recorded by [KB] Sully Jauregui, PTA 12/11/18 1130      Row Name 12/11/18 1123             Coping    Observed Emotional State  accepting;calm;cooperative;flat  -KB      Verbalized Emotional State  acceptance  -KB      Recorded by [KB] Sully Jauregui, PTA 12/11/18 1130      Row Name 12/11/18 1429             Plan of Care Review    Plan of Care Reviewed With  patient  -KH      Recorded by [KH] Tabatha Myles, OT 12/11/18 1434      Row Name 12/11/18 1123             Outcome Summary/Treatment Plan (PT)    Daily Summary of Progress (PT)  progress toward functional goals is good  -KB      Plan for Continued Treatment (PT)  Cont. per POC.  -KB      Anticipated Equipment Needs at Discharge (PT)  -- TBD  -KB      Anticipated Discharge Disposition (PT)  home with assist;home with home health  -KB      Recorded by [KB] Sully Jauregui, PTA 12/11/18 1130        User Key  (r) = Recorded By, (t) = Taken By, (c) = Cosigned By    Initials Name Effective Dates Discipline    KB Sully Jauregui, PTA 12/20/17 -  PT    Tabatha Cantor, OT 04/17/18 -  OT             Occupational Therapy Education     Title: PT OT SLP Therapies (Done)     Topic: Occupational Therapy (Done)     Point: ADL training (Done)     Description: Instruct learner(s) on proper safety adaptation and remediation techniques during self care or  transfers.   Instruct in proper use of assistive devices.    Learning Progress Summary           Patient Acceptance, E,TB, VU by ML at 12/8/2018  9:43 AM    Acceptance, E,TB, VU by AM at 12/7/2018  9:53 AM                   Point: Home exercise program (Done)     Description: Instruct learner(s) on appropriate technique for monitoring, assisting and/or progressing therapeutic exercises/activities.    Learning Progress Summary           Patient Acceptance, E,TB, VU by ML at 12/8/2018  9:43 AM    Acceptance, E,TB, VU by AM at 12/7/2018  9:53 AM                   Point: Precautions (Done)     Description: Instruct learner(s) on prescribed precautions during self-care and functional transfers.    Learning Progress Summary           Patient Acceptance, E,TB, VU by ML at 12/8/2018  9:43 AM    Acceptance, E,TB, VU by AM at 12/7/2018  9:53 AM                   Point: Body mechanics (Done)     Description: Instruct learner(s) on proper positioning and spine alignment during self-care, functional mobility activities and/or exercises.    Learning Progress Summary           Patient Acceptance, E,TB, VU by ML at 12/8/2018  9:43 AM    Acceptance, E,TB, VU by AM at 12/7/2018  9:53 AM                               User Key     Initials Effective Dates Name Provider Type Discipline     06/11/18 -  Aletha Hargrove, RN Registered Nurse Nurse     07/25/18 -  Mable Cooper, ROHIT Registered Nurse Nurse                OT Recommendation and Plan  Outcome Summary/Treatment Plan (OT)  Anticipated Equipment Needs at Discharge (OT): (TBD)  Planned Therapy Interventions (OT Eval): activity tolerance training, adaptive equipment training, BADL retraining, functional balance retraining, occupation/activity based interventions, patient/caregiver education/training, ROM/therapeutic exercise, strengthening exercise, transfer/mobility retraining  Therapy Frequency (OT Eval): 3 times/wk(3-5 x per week)  Plan of Care Review  Plan of Care Reviewed With:  patient  Plan of Care Reviewed With: patient  Outcome Summary: Pt tolerated OT treatment well this date w/ rest as needed. Completed 4 BUE strengthening/endurance exercises x 10-15 reps each w/ hand over hand assistance as needed. Skilled OT to continue current POC as tolerated.   Outcome Measures     Row Name 12/11/18 1100 12/10/18 1700 12/10/18 1519       How much help from another person do you currently need...    Turning from your back to your side while in flat bed without using bedrails?  3  -KB  3  -AG  --    Moving from lying on back to sitting on the side of a flat bed without bedrails?  3  -KB  3  -AG  --    Moving to and from a bed to a chair (including a wheelchair)?  3  -KB  3  -AG  --    Standing up from a chair using your arms (e.g., wheelchair, bedside chair)?  3  -KB  3  -AG  --    Climbing 3-5 steps with a railing?  2  -KB  2  -AG  --    To walk in hospital room?  3  -KB  3  -AG  --    AM-PAC 6 Clicks Score  17  -KB  17  -AG  --       How much help from another is currently needed...    Putting on and taking off regular lower body clothing?  --  --  2  -KH    Bathing (including washing, rinsing, and drying)  --  --  2  -KH    Toileting (which includes using toilet bed pan or urinal)  --  --  2  -KH    Putting on and taking off regular upper body clothing  --  --  3  -KH    Taking care of personal grooming (such as brushing teeth)  --  --  3  -KH    Eating meals  --  --  3  -KH    Score  --  --  15  -KH       Functional Assessment    Outcome Measure Options  AM-PAC 6 Clicks Basic Mobility (PT)  -KB  AM-PAC 6 Clicks Basic Mobility (PT)  -AG  AM-PAC 6 Clicks Daily Activity (OT)  -KH      User Key  (r) = Recorded By, (t) = Taken By, (c) = Cosigned By    Initials Name Provider Type    Aletha Moran, PT Physical Therapist    Sully Moreno, PTA Physical Therapy Assistant    Tabatha Cantor, OT Occupational Therapist           Time Calculation:   Time Calculation- OT     Row Name  12/11/18 1435 12/11/18 1123          Time Calculation- OT    Total Timed Code Minutes- OT  18 minute(s)  -KH  --        Timed Charges    16710 - Gait Training Minutes   --  15  -KB     43138 - OT Therapeutic Activity Minutes  18  -KH  --       User Key  (r) = Recorded By, (t) = Taken By, (c) = Cosigned By    Initials Name Provider Type    Sully Moreno, PTA Physical Therapy Assistant    Tabatha Cantor, OT Occupational Therapist           Therapy Suggested Charges     Code   Minutes Charges    67089 (CPT®) Hc Ot Neuromusc Re Education Ea 15 Min      56809 (CPT®) Hc Ot Ther Proc Ea 15 Min      26331 (CPT®) Hc Ot Therapeutic Act Ea 15 Min 18 1    73163 (CPT®) Hc Ot Manual Therapy Ea 15 Min      30614 (CPT®) Hc Ot Iontophoresis Ea 15 Min      98829 (CPT®) Hc Ot Elec Stim Ea-Per 15 Min      16892 (CPT®) Hc Ot Ultrasound Ea 15 Min      03758 (CPT®) Hc Ot Self Care/Mgmt/Train Ea 15 Min      Total  18 1        Therapy Charges for Today     Code Description Service Date Service Provider Modifiers Qty    07193245533 HC OT SELFCARE CURRENT 12/10/2018 Tabatha Myles OT GO, CK 1    16094271022 HC OT SELFCARE PROJECTED 12/10/2018 Tabatha Myles OT GO, CJ 1    52367804557 HC OT EVAL HIGH COMPLEXITY 2 12/10/2018 Tabatha Myles OT GO 1    74952011950 HC OT THERAPEUTIC ACT EA 15 MIN 12/11/2018 Tabatha Myles OT GO 1          OT G-codes  OT Professional Judgement Used?: Yes  OT Functional Scales Options: AM-PAC 6 Clicks Daily Activity (OT)  Functional Assessment Tool Used: FIM  Functional Limitation: Self care  Self Care Current Status (): At least 40 percent but less than 60 percent impaired, limited or restricted  Self Care Goal Status (): At least 20 percent but less than 40 percent impaired, limited or restricted    Tabatha Myles OT  12/11/2018

## 2018-12-11 NOTE — THERAPY TREATMENT NOTE
Acute Care - Physical Therapy Treatment Note   [place]     Patient Name: Todd Phillips  : 1950  MRN: [id number]  Today's Date: 2018  Onset of Illness/Injury or Date of Surgery: 18  Date of Referral to PT: 18  Referring Physician: Dr. Valdez    Admit Date: 2018    Visit Dx:    ICD-10-CM ICD-9-CM   1. Fever, unspecified fever cause R50.9 780.60     Patient Active Problem List   Diagnosis   • Hyperlipidemia   • COPD (chronic obstructive pulmonary disease) (CMS/HCC)   • Essential hypertension   • Gout without tophus   • Anxiety and depression   • Primary insomnia   • Gastroesophageal reflux disease without esophagitis   • Nonobstructive atherosclerosis of coronary artery   • CKD stage 3 secondary to diabetes (CMS/HCC)   • DM2 (diabetes mellitus, type 2) (CMS/HCC)   • Paroxysmal atrial fibrillation   • Chronic anticoagulation, eliquis   • Encounter for monitoring flecainide therapy   • Malignant neoplasm of head of pancreas (CMS/HCC)   • Bacteremia due to Escherichia coli   • Biliary obstruction   • Thrombocytopenia (CMS/HCC)   • Anemia due to chemotherapy   • Fever       Therapy Treatment    Rehabilitation Treatment Summary     Row Name 18 1123             Treatment Time/Intention    Discipline  physical therapy assistant  -KB      Document Type  therapy note (daily note)  -KB      Subjective Information  no complaints  -KB      Mode of Treatment  individual therapy;physical therapy  -KB      Patient/Family Observations  Pt. in supine with no apparent signs of distress; agreeable to PT.  -KB      Therapy Frequency (PT Clinical Impression)  other (see comments) 3-5 times/ week per priority policy  -KB      Patient Effort  good  -KB      Existing Precautions/Restrictions  fall  -KB      Patient Response to Treatment  Pt. tolerated tx well with no adverse effects noted.  -KB      Recorded by [KB] Sully Jauregui, PTA 18 1130      Row Name 18 1123             Cognitive  Assessment/Intervention- PT/OT    Orientation Status (Cognition)  oriented x 3  -KB      Follows Commands (Cognition)  follows one step commands;WFL  -KB      Recorded by [KB] Sully Jauregui, PTA 12/11/18 1130      Row Name 12/11/18 1123             Safety Issues, Functional Mobility    Safety Issues Affecting Function (Mobility)  awareness of need for assistance;insight into deficits/self awareness;safety precaution awareness;safety precautions follow-through/compliance  -KB      Impairments Affecting Function (Mobility)  balance;coordination;endurance/activity tolerance;pain;strength  -KB      Recorded by [KB] Sully Jauregui, PTA 12/11/18 1130      Row Name 12/11/18 1123             Mobility Assessment/Intervention    Extremity Weight-bearing Status  left lower extremity;right lower extremity  -KB      Left Lower Extremity (Weight-bearing Status)  weight-bearing as tolerated (WBAT)  -KB      Right Lower Extremity (Weight-bearing Status)  weight-bearing as tolerated (WBAT)  -KB      Recorded by [KB] Sully Jauregui, PTA 12/11/18 1130      Row Name 12/11/18 1123             Bed Mobility Assessment/Treatment    Bed Mobility Assessment/Treatment  supine-sit  -KB      Supine-Sit [place] (Bed Mobility)  minimum assist (75% patient effort);nonverbal cues (demo/gesture);verbal cues  -KB      Bed Mobility, Safety Issues  decreased use of arms for pushing/pulling;decreased use of legs for bridging/pushing  -KB      Assistive Device (Bed Mobility)  bed rails  -KB      Recorded by [KB] Sully Jauregui, PTA 12/11/18 1130      Row Name 12/11/18 1123             Transfer Assessment/Treatment    Transfer Assessment/Treatment  sit-stand transfer;stand-sit transfer  -KB      Maintains Weight-bearing Status (Transfers)  able to maintain  -KB      Recorded by [KB] Sully Jauregui, PTA 12/11/18 1130      Row Name 12/11/18 1123             Sit-Stand Transfer    Sit-Stand [place] (Transfers)  minimum assist (75% patient  effort);nonverbal cues (demo/gesture);verbal cues  -KB      Assistive Device (Sit-Stand Transfers)  walker, front-wheeled  -KB      Recorded by [KB] Sully Jauregui, PTA 12/11/18 1130      Row Name 12/11/18 1123             Stand-Sit Transfer    Stand-Sit [place] (Transfers)  minimum assist (75% patient effort);nonverbal cues (demo/gesture);verbal cues  -KB      Assistive Device (Stand-Sit Transfers)  walker, front-wheeled  -KB      Recorded by [KB] Sully Jauregui, PTA 12/11/18 1130      Row Name 12/11/18 1123             Gait/Stairs Assessment/Training    55824 - Gait Training Minutes   15  -KB      Gait/Stairs Assessment/Training  gait/ambulation independence;gait/ambulation assistive device;distance ambulated;gait pattern;gait deviations;maintains weight-bearing status  -KB      [place] Level (Gait)  minimum assist (75% patient effort);nonverbal cues (demo/gesture);verbal cues  -KB      Assistive Device (Gait)  walker, front-wheeled  -KB      Distance in Feet (Gait)  150  -KB      Pattern (Gait)  step-through  -KB      Deviations/Abnormal Patterns (Gait)  bobby decreased;gait speed decreased  -KB      Recorded by [KB] Sully Jauregui, PTA 12/11/18 1130      Row Name 12/11/18 1123             Motor Skills Assessment/Interventions    Additional Documentation  Therapeutic Exercise (Group)  -KB      Recorded by [KB] Sully Jauregui, PTA 12/11/18 1130      Row Name 12/11/18 1123             Therapeutic Exercise    Therapeutic Exercise  seated, lower extremities  -KB      Additional Documentation  Therapeutic Exercise (Row)  -KB      30451 - PT Therapeutic Exercise Minutes  10  -KB      Recorded by [KB] Sully Jauregui, PTA 12/11/18 1130      Row Name 12/11/18 1123             Lower Extremity Seated Therapeutic Exercise    Performed, Seated Lower Extremity (Therapeutic Exercise)  LAQ (long arc quad), knee extension;ankle dorsiflexion/plantarflexion;hip abduction/adduction;hip flexion/extension  -KB       Device, Seated Lower Extremity (Therapeutic Exercise)  other (see comments) pillow  -KB      Exercise Type, Seated Lower Extremity (Therapeutic Exercise)  AROM (active range of motion)  -KB      Expected Outcomes, Seated Lower Extremity (Therapeutic Exercise)  improve functional stability  -KB      Sets/Reps Detail, Seated Lower Extremity (Therapeutic Exercise)  1x10  -KB      Transfers Skills, Training to Functional Activity, Seated Lower Extremity (Therapeutic Exercise)  beginning to transfer skills to functional activity  -KB      Recorded by [KB] Sully Jauregui, [facility] 12/11/18 1130      Row Name 12/11/18 1123             Positioning and Restraints    Pre-Treatment Position  in bed  -KB      Post Treatment Position  chair  -KB      In Chair  sitting;call light within reach;encouraged to call for assist;with family/caregiver  -KB      Recorded by [KB] Sully Jauregui, [facility] 12/11/18 1130      Row Name 12/11/18 1123             Pain Scale: Numbers Pre/Post-Treatment    Pain Location - Orientation  --  -KB      Pain Location  --  -KB      Recorded by [KB] Sully Jauregui, [facility] 12/11/18 1130      Row Name 12/11/18 1123             Pain Scale: FACES Pre/Post-Treatment    Pain: FACES Scale, Pretreatment  --  -KB      Pain: FACES Scale, Post-Treatment  --  -KB      Recorded by [KB] Sully Jauregui, [facility] 12/11/18 1130      Row Name 12/11/18 1123             Sensory Assessment/Intervention    Sensory General Assessment  no sensation deficits identified  -KB      Recorded by [KB] Sully Jauregui, [facility] 12/11/18 1130      Row Name 12/11/18 1123             Vision Assessment/Intervention    Visual Impairment/Limitations  WFL  -KB      Recorded by [KB] Sully Jauregui, [facility] 12/11/18 1130      Row Name 12/11/18 1123             Coping    Observed Emotional State  accepting;calm;cooperative;flat  -KB      Verbalized Emotional State  acceptance  -KB      Recorded by [KB] Sully Jauregui, [facility] 12/11/18 1130      Row Name 12/11/18 1123              Outcome Summary/Treatment Plan (PT)    Daily Summary of Progress (PT)  progress toward functional goals is good  -KB      Plan for Continued Treatment (PT)  Cont. per POC.  -KB      Anticipated Equipment Needs at Discharge (PT)  -- TBD  -KB      Anticipated Discharge Disposition (PT)  home with assist;home with home health  -KB      Recorded by [KB] Sully Jauregui, PTA 12/11/18 5330        User Key  (r) = Recorded By, (t) = Taken By, (c) = Cosigned By    Initials Name Effective Dates Discipline    GONZALES Sully Jauregui, PTA 12/20/17 -  PT                   Physical Therapy Education     Title: PT OT SLP Therapies (Done)     Topic: Physical Therapy (Done)     Point: Mobility training (Done)     Learning Progress Summary           Patient Acceptance, E,TB, VU,NR by KB at 12/11/2018 11:22 AM    Acceptance, E,D, VU,NR by AG at 12/10/2018  5:52 PM    Acceptance, E,TB, VU by ML at 12/8/2018  9:43 AM    Acceptance, E,TB, VU by AM at 12/7/2018  9:53 AM   Family Acceptance, E,D, VU,NR by AG at 12/10/2018  5:52 PM                   Point: Home exercise program (Done)     Learning Progress Summary           Patient Acceptance, E,TB, VU,NR by KB at 12/11/2018 11:22 AM    Acceptance, E,D, VU,NR by AG at 12/10/2018  5:52 PM    Acceptance, E,TB, VU by ML at 12/8/2018  9:43 AM    Acceptance, E,TB, VU by AM at 12/7/2018  9:53 AM   Family Acceptance, E,D, VU,NR by AG at 12/10/2018  5:52 PM                   Point: Body mechanics (Done)     Learning Progress Summary           Patient Acceptance, E,TB, VU,NR by KB at 12/11/2018 11:22 AM    Acceptance, E,D, VU,NR by AG at 12/10/2018  5:52 PM    Acceptance, E,TB, VU by ML at 12/8/2018  9:43 AM    Acceptance, E,TB, VU by AM at 12/7/2018  9:53 AM   Family Acceptance, E,D, VU,NR by AG at 12/10/2018  5:52 PM                   Point: Precautions (Done)     Learning Progress Summary           Patient Acceptance, E,TB, VU,NR by GONZALES at 12/11/2018 11:22 AM    Acceptance, CLARISSA CARRINGTON VU,NR by AG at  12/10/2018  5:52 PM    Acceptance, E,TB, VU by ML at 12/8/2018  9:43 AM    Acceptance, E,TB, VU by AM at 12/7/2018  9:53 AM   Family Acceptance, E,D, VU,NR by  at 12/10/2018  5:52 PM                               User Key     Initials Effective Dates Name Provider Type Discipline     04/03/18 -  Aletha Metcalf, PT Physical Therapist PT     12/20/17 -  Sully Jauregui PTA Physical Therapy Assistant PT     06/11/18 -  Aletha Hargrove, RN Registered Nurse Nurse     07/25/18 -  Mable Cooper, ROHIT Registered Nurse Nurse                PT Recommendation and Plan  Anticipated Discharge Disposition (PT): home with assist, home with home health  Therapy Frequency (PT Clinical Impression): other (see comments)(3-5 times/ week per priority policy)  Outcome Summary/Treatment Plan (PT)  Daily Summary of Progress (PT): progress toward functional goals is good  Plan for Continued Treatment (PT): Cont. per POC.  Anticipated Equipment Needs at Discharge (PT): (TBD)  Anticipated Discharge Disposition (PT): home with assist, home with home health  Plan of Care Reviewed With: patient  Progress: improving  Outcome Summary: Pt. performed all therapeutic activities as prescribed and required adequate rest breaks. Demonstrated improvements towards functional independence.  Outcome Measures     Row Name 12/11/18 1100 12/10/18 1700 12/10/18 1519       How much help from another person do you currently need...    Turning from your back to your side while in flat bed without using bedrails?  3  -KB  3  -AG  --    Moving from lying on back to sitting on the side of a flat bed without bedrails?  3  -KB  3  -AG  --    Moving to and from a bed to a chair (including a wheelchair)?  3  -KB  3  -AG  --    Standing up from a chair using your arms (e.g., wheelchair, bedside chair)?  3  -KB  3  -AG  --    Climbing 3-5 steps with a railing?  2  -KB  2  -AG  --    To walk in hospital room?  3  -KB  3  -AG  --    AM-PAC 6 Clicks Score  17  -KB  17   -AG  --       How much help from another is currently needed...    Putting on and taking off regular lower body clothing?  --  --  2  -KH    Bathing (including washing, rinsing, and drying)  --  --  2  -KH    Toileting (which includes using toilet bed pan or urinal)  --  --  2  -KH    Putting on and taking off regular upper body clothing  --  --  3  -KH    Taking care of personal grooming (such as brushing teeth)  --  --  3  -KH    Eating meals  --  --  3  -KH    Score  --  --  15  -KH       Functional Assessment    Outcome Measure Options  AM-PAC 6 Clicks Basic Mobility (PT)  -KB  AM-PAC 6 Clicks Basic Mobility (PT)  -AG  AM-PAC 6 Clicks Daily Activity (OT)  -KH      User Key  (r) = Recorded By, (t) = Taken By, (c) = Cosigned By    Initials Name Provider Type    Aletha Moran, PT Physical Therapist    Sully Moreno, EDWARD Physical Therapy Assistant    Tabatha Cantor, OT Occupational Therapist         Time Calculation:   PT Charges     Row Name 12/11/18 1123 12/11/18 1120          Time Calculation    PT Received On  --  12/11/18  -KB     PT - Next Appointment  --  12/12/18  -KB     PT Goal Re-Cert Due Date  --  12/24/18  -KB        Time Calculation- PT    Total Timed Code Minutes- PT  --  25 minute(s)  -KB        Timed Charges    47237 - PT Therapeutic Exercise Minutes  10  -KB  --     33904 - Gait Training Minutes   15  -KB  --       User Key  (r) = Recorded By, (t) = Taken By, (c) = Cosigned By    Initials Name Provider Type    Sully Moreno PTA Physical Therapy Assistant        Therapy Suggested Charges     Code   Minutes Charges    86798 (CPT®) Hc Pt Neuromusc Re Education Ea 15 Min      90545 (CPT®) Hc Pt Ther Proc Ea 15 Min 10 1    33737 (CPT®) Hc Gait Training Ea 15 Min 15 1    49024 (CPT®) Hc Pt Therapeutic Act Ea 15 Min      49919 (CPT®) Hc Pt Manual Therapy Ea 15 Min      27656 (CPT®) Hc Pt Iontophoresis Ea 15 Min      73708 (CPT®) Hc Pt Elec Stim Ea-Per 15 Min      88073 (CPT®)  Hc Pt Ultrasound Ea 15 Min      10491 (CPT®) Hc Pt Self Care/Mgmt/Train Ea 15 Min      11149 (CPT®) Hc Pt Prosthetic (S) Train Initial Encounter, Each 15 Min      67104 (CPT®) Hc Pt Orthotic(S)/Prosthetic(S) Encounter, Each 15 Min      59642 (CPT®) Hc Orthotic(S) Mgmt/Train Initial Encounter, Each 15min      Total  25 2        Therapy Charges for Today     Code Description Service Date Service Provider Modifiers Qty    26227500125 HC GAIT TRAINING EA 15 MIN 12/11/2018 Sully Jauregui, PTA GP 1    05682294524 HC PT THER PROC EA 15 MIN 12/11/2018 Sully Jauregui, PTA GP 1    12788562651 HC PT THER SUPP EA 15 MIN 12/11/2018 Sully Jauregui, PTA GP 1          PT G-Codes  Outcome Measure Options: AM-PAC 6 Clicks Basic Mobility (PT)  AM-PAC 6 Clicks Score: 17  Score: 15  Functional Limitation: Mobility: Walking and moving around  Mobility: Walking and Moving Around Current Status (): At least 40 percent but less than 60 percent impaired, limited or restricted  Mobility: Walking and Moving Around Goal Status (): At least 20 percent but less than 40 percent impaired, limited or restricted    Sully Jauregui PTA  12/11/2018

## 2018-12-11 NOTE — CONSULTS
Palliative Care Initial Consult     Attending Physician: Aaron Rao MD  Referring Provider: Dr. Rao    assistance with clarification of goals of care, non-pain symptoms and pain  Code Status:   Code Status and Medical Interventions:   Ordered at: 12/07/18 0635     Code Status:    CPR     Medical Interventions (Level of Support Prior to Arrest):    Full      Advanced Directives: Advance Directive Status: Patient does not have advance directive   Healthcare surrogate: Wife  Goals of Care: To get strong enough to have Whipple performed.    HPI:  Todd Phillips is a 68 y.o. male admitted on 12/6 with sepsis. Pt was diagnosed with pancreatic ca following gallbladder surgery in August. He has had multiple hospitalizations for sepsis related to this. He has been followed by Dr. Adamson in [place] for possible Whipple, and Dr. Alves locally for chemo prior to surgery. He has been started on TPN at home to help him regain strength. We were consulted for sx mgmt and GOC.     Pt reports that his n/v is better on scheduled zofran and phenergan. He doesn't like to take the phenergan due to sedation. Denies trial of reglan or haldol in the past. Has been on compazine. He also reports his abd pain is mostly after vomiting. He doesn't take his PRN very often, here or at home. He does have issues with constipation despite being started on Linzess and Miralax at home. He last went on 12/9. He denies trouble sleeping. He does admit to some problems with his mood. He otherwise denies complaints other than being frustrated that he hasn't been able to get to surgery and fear that he may not be able to get to surgery now.         ROS: Negative except as above in HPI.     Past Medical History:   Diagnosis Date   • Antral gastritis    • Asthma    • Atrial fibrillation (CMS/HCC)     treated with oral blood thinner   • Chest pain    • COPD (chronic obstructive pulmonary disease) (CMS/HCC)    • Coronary artery disease     no stents    • Diabetes mellitus (CMS/HCC)     type 2    • Duodenitis    • Elevated cholesterol    • Emphysema of lung (CMS/HCC)    • Gallbladder disease     removed    • GERD (gastroesophageal reflux disease)    • Hyperlipidemia    • Hypertension    • Jaundice    • Kidney stone    • Kidney stones     had lithotripsy and passed 36 kidney stones as well as had one surgically removed.   • Malignant neoplasm of head of pancreas (CMS/HCC) 9/5/2018   • Palpitations    • Pneumonia 08/2018   • Reflux esophagitis    • Renal failure     stage 3 kidney disease   • Sepsis (CMS/HCC)      Past Surgical History:   Procedure Laterality Date   • BILE DUCT STENT PLACEMENT      [facility] 2 weeks ago    • CARDIAC CATHETERIZATION  11/01/1999   • CARDIOVASCULAR STRESS TEST  09/2012   • COLONOSCOPY     • CYSTOSCOPY BLADDER STONE LITHOTRIPSY     • ECHO - CONVERTED  09/2012   • KIDNEY STONE SURGERY     • KIDNEY STONE SURGERY      open abdominal surgery   • UPPER GASTROINTESTINAL ENDOSCOPY  08/30/2012     Social History     Socioeconomic History   • Marital status:      Spouse name: Not on file   • Number of children: Not on file   • Years of education: Not on file   • Highest education level: Not on file   Social Needs   • Financial resource strain: Not on file   • Food insecurity - worry: Not on file   • Food insecurity - inability: Not on file   • Transportation needs - medical: Not on file   • Transportation needs - non-medical: Not on file   Occupational History   • Not on file   Tobacco Use   • Smoking status: Never Smoker   • Smokeless tobacco: Never Used   Substance and Sexual Activity   • Alcohol use: No   • Drug use: No   • Sexual activity: Defer     Partners: Female   Other Topics Concern   • Not on file   Social History Narrative    He is  and lives alone with his wife although they have 3 children together who all live close by. He is retired from the [employer] and was exposed to Agent Orange during [place].  He is a lifelong nonsmoker.      Family History   Problem Relation Age of Onset   • Heart attack Mother    • Heart disease Mother    • Stroke Mother    • Kidney disease Mother    • Heart failure Mother    • Lung disease Father    • Tuberculosis Father    • Diabetes Sister    • Heart attack Brother    • Heart failure Brother    • Heart disease Brother    • Diabetes Sister    • No Known Problems Brother    • No Known Problems Brother        Allergies   Allergen Reactions   • Contrast Dye Other (See Comments)     Can't have due to kidney failure per family       Current Facility-Administered Medications   Medication Dose Route Frequency Provider Last Rate Last Dose   • acetaminophen (TYLENOL) tablet 650 mg  650 mg Oral Q4H PRN Davin Faye MD       • Adult [facility] TPN   Intravenous Q24H (TPN) Rebeca Zepeda, RPH 80 mL/hr at 12/10/18 1847      And   • fat emulsion (INTRALIPID,LIPOSYN) 20 % infusion 50 g  250 mL Intravenous Once per day on Mon Wed Fri Rebeca Zepeda, RPH 20.8 mL/hr at 12/10/18 1755 50 g at 12/10/18 1755    And   • multiple vitamin 10 mL, trace elements Cr-Cu-Mn-Zn 5 mL in sodium chloride 0.9 % 500 mL IVPB   Intravenous Once per day on Mon Wed Fri Rebeca Zepeda, .8 mL/hr at 12/10/18 1202     • albuterol (PROVENTIL) nebulizer solution 0.083% 2.5 mg/3mL  2.5 mg Nebulization Q4H PRN Davin Faye MD   2.5 mg at 12/11/18 0701   • allopurinol (ZYLOPRIM) tablet 100 mg  100 mg Oral Daily Davin Faye MD   100 mg at 12/11/18 0839   • aluminum-magnesium hydroxide-simethicone (MAALOX MAX) 400-400-40 MG/5ML suspension 15 mL  15 mL Oral Q6H PRN Davin Faye MD       • apixaban (ELIQUIS) tablet 5 mg  5 mg Oral Q12H Davin Faye MD   5 mg at 12/11/18 0839   • cefepime (MAXIPIME) 2 g/100 mL 0.9% NS (mbp)  2 g Intravenous Q8H Davin Faye, MD 0 mL/hr at 12/08/18 0500 2 g at 12/11/18 3624   • cholecalciferol (VITAMIN D3) tablet 1,000 Units   1,000 Units Oral Daily Davin Faye MD   1,000 Units at 12/11/18 0839   • colchicine tablet 0.6 mg  0.6 mg Oral Daily Davin Faye MD   0.6 mg at 12/11/18 0839   • dextrose (D50W) 25 g/ 50mL Intravenous Solution 25 g  25 g Intravenous Q15 Min PRN Davin Faye MD       • dextrose (GLUTOSE) oral gel 15 g  15 g Oral Q15 Min PRN Davin Faye MD       • dronabinol (MARINOL) capsule 5 mg  5 mg Oral BID  Davin Faye MD   5 mg at 12/11/18 0747   • flecainide (TAMBOCOR) tablet 50 mg  50 mg Oral Q12H Davin Faye MD   50 mg at 12/11/18 0839   • FLUoxetine (PROzac) capsule 20 mg  20 mg Oral BID Davin Faye MD   20 mg at 12/11/18 0839   • glucagon (human recombinant) (GLUCAGEN DIAGNOSTIC) injection 1 mg  1 mg Subcutaneous PRN Davin Faye MD       • insulin aspart (novoLOG) injection 0-7 Units  0-7 Units Subcutaneous [facility]6H [facility]   2 Units at 12/11/18 1122   • linaclotide (LINZESS) capsule 145 mcg  145 mcg Oral QAM  Davin Faye MD   145 mcg at 12/11/18 0743   • LORazepam (ATIVAN) tablet 0.5 mg  0.5 mg Oral Q6H PRN Sarah Arce PA       • Magnesium Sulfate 2 gram Bolus, followed by 8 gram infusion (total Mg dose 10 grams)- Mg less than or equal to 1mg/dL  2 g Intravenous PRN Davin Faye MD        Or   • Magnesium Sulfate 2 gram / 50mL Infusion (GIVE X 3 BAGS TO EQUAL 6GM TOTAL DOSE) - Mg 1.1 - 1.5 mg/dl  2 g Intravenous PRN Davin Faye MD        Or   • Magnesium Sulfate 4 gram infusion- Mg 1.6-1.9 mg/dL  4 g Intravenous PRN Davin Faye MD       • micafungin sodium (MYCAMINE) 100 mg in sodium chloride 0.9 % 100 mL IVPB  100 mg Intravenous Q24H OSarah Rojas PA   100 mg at 12/10/18 1757   • mometasone (ASMANEX) 220 MCG/INH inhaler 2 puff  2 puff Inhalation Nightly Davin Faye MD   2 puff at 12/08/18 2100   • montelukast (SINGULAIR) tablet 10 mg  10 mg Oral Daily Davin Faye MD   10  mg at 12/11/18 0839   • Morphine (MS CONTIN) 12 hr tablet 30 mg  30 mg Oral Q12H Sarah Arce PA   30 mg at 12/11/18 0839   • morphine injection 1 mg  1 mg Intravenous Q4H PRN Cindy Valdez MD       • nitroglycerin (NITROSTAT) SL tablet 0.4 mg  0.4 mg Sublingual PRN Davin Faye MD       • ondansetron (ZOFRAN) 8 mg in sodium chloride 0.9 % 50 mL IVPB  8 mg Intravenous Q8H Faiza Toro MD   Stopped at 12/11/18 1355   • oxyCODONE (ROXICODONE) immediate release tablet 5 mg  5 mg Oral Q4H PRN Sarah Arce PA   5 mg at 12/08/18 0449   • pantoprazole (PROTONIX) EC tablet 40 mg  40 mg Oral QAM Davin Faye MD   40 mg at 12/11/18 0529   • Pharmacy Consult   Does not apply Continuous PRN Davin Faye MD       • polyethylene glycol (MIRALAX) powder 17 g  17 g Oral Daily PRN Davin Faye MD       • potassium chloride (K-DUR,KLOR-CON) CR tablet 40 mEq  40 mEq Oral PRN Davin Faye MD        Or   • potassium chloride (KLOR-CON) packet 40 mEq  40 mEq Oral PRN Davin Faye MD        Or   • potassium chloride 10 mEq in 100 mL IVPB  10 mEq Intravenous Q1H PRN Davin Faye MD       • prochlorperazine (COMPAZINE) tablet 10 mg  10 mg Oral Q4H PRN Davin Faye MD       • promethazine (PHENERGAN) injection 12.5 mg  12.5 mg Intravenous 4x Daily Osvaldo Callaway, H       • sodium chloride 0.9 % flush 10 mL  10 mL Intravenous PRN Aisha Wolfe DO       • sodium chloride 0.9 % flush 3 mL  3 mL Intravenous Q12H Davin Faye MD   3 mL at 12/11/18 0839   • sodium chloride 0.9 % flush 3-10 mL  3-10 mL Intravenous PRN Davin Faye MD       • tiotropium bromide-olodaterol (STIOLTO RESPIMAT) 2.5-2.5 MCG/ACT inhaler 2 puff  2 puff Inhalation Daily Davin Faye MD   2 puff at 12/11/18 1012   • vancomycin (VANCOCIN) 1,500 mg in sodium chloride 0.9 % 500 mL IVPB  1,500 mg Intravenous Q24H Cinthia Nettles RPH   1,500 mg at  "12/11/18 1146     Facility-Administered Medications Ordered in Other Encounters   Medication Dose Route Frequency Provider Last Rate Last Dose   • sodium chloride 0.9 % infusion  - ADS Override Pull            • sodium chloride 0.9 % infusion  - ADS Override Pull                Adult [facility] TPN  Last Rate: 80 mL/hr at 12/10/18 1847   Pharmacy Consult       •  acetaminophen  •  albuterol  •  aluminum-magnesium hydroxide-simethicone  •  dextrose  •  dextrose  •  glucagon (human recombinant)  •  LORazepam  •  magnesium sulfate **OR** magnesium sulfate **OR** magnesium sulfate  •  Morphine  •  nitroglycerin  •  oxyCODONE  •  Pharmacy Consult  •  polyethylene glycol  •  potassium chloride **OR** potassium chloride **OR** potassium chloride  •  prochlorperazine  •  [COMPLETED] Insert peripheral IV **AND** sodium chloride  •  sodium chloride    Current medication reviewed for route, type, dose and frequency and are current per MAR.    Palliative Performance Scale Score:     /76 (BP Location: Left arm, Patient Position: Lying)   Pulse 74   Temp 97.7 °F (36.5 °C) (Oral)   Resp 18   Ht 182.9 cm (72\")   Wt 89.4 kg (197 lb 2 oz)   SpO2 92%   BMI 26.73 kg/m²     Intake/Output Summary (Last 24 hours) at 12/11/2018 1631  Last data filed at 12/11/2018 1555  Gross per 24 hour   Intake 460 ml   Output --   Net 460 ml       PE:  General Appearance:    Chronically ill appearing, alert, cooperative, NAD   HEENT:    NC/AT, without obvious abnormality, EOMI, anicteric    Neck:   supple, trachea midline, no JVD   Lungs:     CTAB without w/r/r    Heart:    RRR, normal S1 and S2, no M/R/G   Abdomen:     Soft, NT, ND, hypoactive BS    Extremities:   Moves all extremities, no edema   Pulses:   Pulses palpable and equal bilaterally   Skin:   Warm, dry   Neurologic:   A/Ox3, cooperative   Psych:   Calm, appropriate, slightly withdrawn         Labs:   Results from last 7 days   Lab Units  12/11/18   0435   WBC 10*3/mm3  " 7.97   HEMOGLOBIN g/dL  10.7*   HEMATOCRIT %  32.8*   PLATELETS 10*3/mm3  197     Results from last 7 days   Lab Units  12/11/18   0435   SODIUM mmol/L  134*   POTASSIUM mmol/L  4.0   CHLORIDE mmol/L  101   CO2 mmol/L  27.6   BUN mg/dL  28*   CREATININE mg/dL  1.27   GLUCOSE mg/dL  143*   CALCIUM mg/dL  9.2     Results from last 7 days   Lab Units  12/11/18   0435   SODIUM mmol/L  134*   POTASSIUM mmol/L  4.0   CHLORIDE mmol/L  101   CO2 mmol/L  27.6   BUN mg/dL  28*   CREATININE mg/dL  1.27   CALCIUM mg/dL  9.2   BILIRUBIN mg/dL  0.2   ALK PHOS U/L  83   ALT (SGPT) U/L  13   AST (SGOT) U/L  18   GLUCOSE mg/dL  143*     Imaging Results (last 72 hours)     Procedure Component Value Units Date/Time    CT Chest Without Contrast [[id number]] Collected:  12/10/18 0728     Updated:  12/10/18 0732    Narrative:       EXAM: CT CHEST WO CONTRAST-              CLINICAL INDICATION:R/O pneumonia. XRAY with consolidation; R50.9-Fever,  unspecified      COMPARISON: COMPARISON: 9/11/2018     TECHNIQUE: Multiple axial CT images were obtained from lung apex through  upper abdomen WITHOUT administration of IV contrast. Reformatted images  in the coronal and/or sagittal plane(s) were generated from the axial  data set to facilitate diagnostic accuracy and/or surgical planning.  Oral Contrast:NONE.     Radiation dose reduction techniques were utilized per ALARA protocol.  Automated exposure control was initiated through either or [employer] or  [email or web address] software packages by  protocol.    DOSE (DLP mGy-cm): 516.01 mGy.cm        FINDINGS:     LUNGS: CHRONIC APPEARING INTERSTITIAL LUNG CHANGES ARE NOTED WITH  UNDERLYING MILD CENTRILOBULAR EMPHYSEMA. SUBPLEURAL AND PERIPHERAL  FIBROSIS OF THE LOWER LUNG ZONES NOTED. NO AIRSPACE CONSOLIDATIONS OR  SUSPICIOUS PULMONARY NODULES/MASSES IDENTIFIED.     HEART: MODERATE CARDIOMEGALY WITH MODERATE CORONARY VASCULAR  CALCIFICATIONS.     PERICARDIUM: No effusion.     MEDIASTINUM: No  masses. No enlarged lymph nodes.  No fluid collections.     PLEURA: No pleural effusion. No pleural mass or abnormal calcification.  No pneumothorax.     MAJOR AIRWAYS: Clear. No intrinsic mass.     VASCULATURE: No evidence of aneurysm.     VISUALIZED UPPER ABDOMEN:SINCE THE PREVIOUS EXAM, THERE HAS BEEN  INTERVAL PLACEMENT OF BILIARY STENT WHICH LIKELY ACCOUNTS FOR PRESENCE  OF PNEUMOBILIA WITHIN THE LIVER.     OTHER: None.     BONES: DEGENERATIVE CHANGES THORACIC SPINE BUT NO ACUTE BONY FINDINGS.       Impression:       1. NO SIGNIFICANT CHANGE IN THE APPEARANCE OF THE CHEST AGAIN  DEMONSTRATING CHRONIC LUNG CHANGES WITH MILD BASILAR SUBPLEURAL  FIBROSIS.  2. STABLE CARDIOMEGALY.  3. INTERVAL PLACEMENT OF BILIARY STENT WHICH ACCOUNTS FOR PNEUMOBILIA.     This report was finalized on 12/10/2018 7:30 AM by Dr. Klever Paredes MD.       XR Chest PA & Lateral [[id number]] Collected:  12/09/18 1115     Updated:  12/09/18 1118    Narrative:       EXAMINATION: XR CHEST PA AND LATERAL-      CLINICAL INDICATION: r/o pneumonia; R50.9-Fever, unspecified          COMPARISON: 12/7/2018      TECHNIQUE: XR CHEST PA AND LATERAL-      FINDINGS:   PowerPort is stable in the superior vena cava   Atelectasis in the left lung base  Equivocal pneumonia in the right infrahilar region   No pneumothorax.   Bony and soft tissue structures are unremarkable.            Impression:       Right infrahilar consolidation     This report was finalized on 12/9/2018 11:16 AM by Dr. Ernesto Bell MD.             Diagnostics: Reviewed    A: Todd Phillips is a 68 y.o. male with pancreatic ca, sepsis (recurrent) from GI source         P:   Pt reports improvement in n/v. Suggest PRN haldol at low dose of 0.5-1mg q6h PRN for nausea. Also may want to consider trial of reglan, which could also help with constipation.     Pt has trouble with volume in Miralax at times due to nausea. Would suggest trying lactulose PRN or scheduled to help with constipation,  which certainly will not help his n/v or abd pain if his constipation worsens.     No changes to pain meds suggested at this time.     Pt voiced concerns that he may not get to surgery. Dtr at bedside very tearful asking if palliative care was consulted because they don't think he'll make it to surgery now. Spent some time reviewing role as symptom management and supportive care. Did mention that it is reasonable to be considering alternatives if improving and surgery do not seem feasible in the future, but pt and dtr are not prepared to hear about prognosis from that standpoint yet.     We appreciate the consult and the opportunity to participate in Todd Phillips's care. We will continue to follow along. Please do not hesitate to contact us regarding further symptom management or goals of care needs, including after hours or on weekends via our on call provider at [phone].     Time: 75 minutes spent reviewing medical and medication records, assessing and examining patient, discussing with family, answering questions, providing some guidance about a plan and documentation of care, and coordinating care with other healthcare members, with > 50% time spent face to face.     Nneka Thomson MD    12/11/2018

## 2018-12-12 LAB
ALBUMIN SERPL-MCNC: 3.5 G/DL (ref 3.4–4.8)
ALBUMIN/GLOB SERPL: 1.3 G/DL (ref 1.5–2.5)
ALP SERPL-CCNC: 85 U/L (ref 40–129)
ALT SERPL W P-5'-P-CCNC: 15 U/L (ref 10–44)
ANION GAP SERPL CALCULATED.3IONS-SCNC: 5.5 MMOL/L (ref 3.6–11.2)
ANISOCYTOSIS BLD QL: NORMAL
AST SERPL-CCNC: 21 U/L (ref 10–34)
BILIRUB SERPL-MCNC: 0.4 MG/DL (ref 0.2–1.8)
BUN BLD-MCNC: 25 MG/DL (ref 7–21)
BUN/CREAT SERPL: 21.6 (ref 7–25)
CALCIUM SPEC-SCNC: 9.1 MG/DL (ref 7.7–10)
CANCER AG19-9 SERPL-ACNC: 1089 U/ML (ref 0–35)
CHLORIDE SERPL-SCNC: 104 MMOL/L (ref 99–112)
CO2 SERPL-SCNC: 23.5 MMOL/L (ref 24.3–31.9)
CREAT BLD-MCNC: 1.16 MG/DL (ref 0.43–1.29)
CRP SERPL-MCNC: <0.5 MG/DL (ref 0–0.99)
DEPRECATED RDW RBC AUTO: 53.1 FL (ref 37–54)
EOSINOPHIL # BLD MANUAL: 0.37 10*3/MM3 (ref 0–0.7)
EOSINOPHIL NFR BLD MANUAL: 5 % (ref 0–7)
ERYTHROCYTE [DISTWIDTH] IN BLOOD BY AUTOMATED COUNT: 16.4 % (ref 11.5–14.5)
GFR SERPL CREATININE-BSD FRML MDRD: 62 ML/MIN/1.73
GLOBULIN UR ELPH-MCNC: 2.8 GM/DL
GLUCOSE BLD-MCNC: 137 MG/DL (ref 70–110)
GLUCOSE BLDC GLUCOMTR-MCNC: 137 MG/DL (ref 70–130)
GLUCOSE BLDC GLUCOMTR-MCNC: 139 MG/DL (ref 70–130)
GLUCOSE BLDC GLUCOMTR-MCNC: 149 MG/DL (ref 70–130)
GLUCOSE BLDC GLUCOMTR-MCNC: 151 MG/DL (ref 70–130)
HCT VFR BLD AUTO: 34.4 % (ref 42–52)
HGB BLD-MCNC: 11 G/DL (ref 14–18)
HYPOCHROMIA BLD QL: NORMAL
LYMPHOCYTES # BLD MANUAL: 1.91 10*3/MM3 (ref 1–3)
LYMPHOCYTES NFR BLD MANUAL: 26 % (ref 16–46)
LYMPHOCYTES NFR BLD MANUAL: 7 % (ref 0–12)
MAGNESIUM SERPL-MCNC: 2.1 MG/DL (ref 1.7–2.6)
MCH RBC QN AUTO: 29.6 PG (ref 27–33)
MCHC RBC AUTO-ENTMCNC: 32 G/DL (ref 33–37)
MCV RBC AUTO: 92.5 FL (ref 80–94)
MONOCYTES # BLD AUTO: 0.51 10*3/MM3 (ref 0.1–0.9)
NEUTROPHILS # BLD AUTO: 4.56 10*3/MM3 (ref 1.4–6.5)
NEUTROPHILS NFR BLD MANUAL: 59 % (ref 40–75)
NEUTS BAND NFR BLD MANUAL: 3 % (ref 0–8)
OSMOLALITY SERPL CALC.SUM OF ELEC: 272.9 MOSM/KG (ref 273–305)
PLAT MORPH BLD: NORMAL
PLATELET # BLD AUTO: 189 10*3/MM3 (ref 130–400)
PMV BLD AUTO: 10.1 FL (ref 6–10)
POTASSIUM BLD-SCNC: 4.1 MMOL/L (ref 3.5–5.3)
PROT SERPL-MCNC: 6.3 G/DL (ref 6–8)
RBC # BLD AUTO: 3.72 10*6/MM3 (ref 4.7–6.1)
SCAN SLIDE: NORMAL
SODIUM BLD-SCNC: 133 MMOL/L (ref 135–153)
WBC NRBC COR # BLD: 7.35 10*3/MM3 (ref 4.5–12.5)

## 2018-12-12 PROCEDURE — 94799 UNLISTED PULMONARY SVC/PX: CPT

## 2018-12-12 PROCEDURE — 82962 GLUCOSE BLOOD TEST: CPT

## 2018-12-12 PROCEDURE — 63710000001 DRONABINOL PER 2.5 MG: Performed by: INTERNAL MEDICINE

## 2018-12-12 PROCEDURE — 25010000002 PROMETHAZINE PER 50 MG

## 2018-12-12 PROCEDURE — 99232 SBSQ HOSP IP/OBS MODERATE 35: CPT | Performed by: INTERNAL MEDICINE

## 2018-12-12 PROCEDURE — 83735 ASSAY OF MAGNESIUM: CPT | Performed by: INTERNAL MEDICINE

## 2018-12-12 PROCEDURE — 80053 COMPREHEN METABOLIC PANEL: CPT | Performed by: INTERNAL MEDICINE

## 2018-12-12 PROCEDURE — 63710000001 INSULIN ASPART PER 5 UNITS

## 2018-12-12 PROCEDURE — 85025 COMPLETE CBC W/AUTO DIFF WBC: CPT | Performed by: INTERNAL MEDICINE

## 2018-12-12 PROCEDURE — 97530 THERAPEUTIC ACTIVITIES: CPT

## 2018-12-12 PROCEDURE — 86140 C-REACTIVE PROTEIN: CPT | Performed by: NURSE PRACTITIONER

## 2018-12-12 PROCEDURE — 97116 GAIT TRAINING THERAPY: CPT

## 2018-12-12 PROCEDURE — 25010000002 ONDANSETRON PER 1 MG: Performed by: INTERNAL MEDICINE

## 2018-12-12 PROCEDURE — 97110 THERAPEUTIC EXERCISES: CPT

## 2018-12-12 PROCEDURE — 99233 SBSQ HOSP IP/OBS HIGH 50: CPT | Performed by: INTERNAL MEDICINE

## 2018-12-12 PROCEDURE — 25010000002 CEFEPIME 2 G/NS 100 ML SOLUTION: Performed by: INTERNAL MEDICINE

## 2018-12-12 PROCEDURE — 25010000002 VANCOMYCIN 5 G RECONSTITUTED SOLUTION 5,000 MG VIAL

## 2018-12-12 PROCEDURE — 85007 BL SMEAR W/DIFF WBC COUNT: CPT | Performed by: INTERNAL MEDICINE

## 2018-12-12 RX ORDER — SODIUM CHLORIDE 9 MG/ML
INJECTION, SOLUTION INTRAVENOUS
Status: COMPLETED
Start: 2018-12-12 | End: 2018-12-12

## 2018-12-12 RX ORDER — CEFDINIR 300 MG/1
300 CAPSULE ORAL EVERY 12 HOURS SCHEDULED
Status: DISCONTINUED | OUTPATIENT
Start: 2018-12-12 | End: 2018-12-13 | Stop reason: HOSPADM

## 2018-12-12 RX ORDER — METRONIDAZOLE 250 MG/1
500 TABLET ORAL EVERY 12 HOURS SCHEDULED
Status: DISCONTINUED | OUTPATIENT
Start: 2018-12-12 | End: 2018-12-13 | Stop reason: HOSPADM

## 2018-12-12 RX ORDER — PANTOPRAZOLE SODIUM 40 MG/1
40 TABLET, DELAYED RELEASE ORAL
Status: DISCONTINUED | OUTPATIENT
Start: 2018-12-12 | End: 2018-12-13 | Stop reason: HOSPADM

## 2018-12-12 RX ORDER — LORAZEPAM 0.5 MG/1
0.5 TABLET ORAL EVERY 6 HOURS PRN
Status: DISCONTINUED | OUTPATIENT
Start: 2018-12-12 | End: 2018-12-13 | Stop reason: HOSPADM

## 2018-12-12 RX ADMIN — APIXABAN 5 MG: 5 TABLET, FILM COATED ORAL at 21:25

## 2018-12-12 RX ADMIN — FLECAINIDE ACETATE 50 MG: 50 TABLET ORAL at 09:34

## 2018-12-12 RX ADMIN — ONDANSETRON 8 MG: 2 INJECTION INTRAMUSCULAR; INTRAVENOUS at 03:47

## 2018-12-12 RX ADMIN — LEUCINE, PHENYLALANINE, LYSINE, METHIONINE, ISOLEUCINE, VALINE, HISTIDINE, THREONINE, TRYPTOPHAN, ALANINE, GLYCINE, ARGININE, PROLINE, SERINE, TYROSINE, SODIUM ACETATE, DIBASIC POTASSIUM PHOSPHATE, MAGNESIUM CHLORIDE, SODIUM CHLORIDE, CALCIUM CHLORIDE, DEXTROSE
365; 280; 290; 200; 300; 290; 240; 210; 90; 1035; 515; 575; 340; 250; 20; 340; 261; 51; 59; 33; 20 INJECTION INTRAVENOUS at 18:21

## 2018-12-12 RX ADMIN — ONDANSETRON 8 MG: 2 INJECTION INTRAMUSCULAR; INTRAVENOUS at 13:54

## 2018-12-12 RX ADMIN — ASCORBIC ACID, VITAMIN A PALMITATE, CHOLECALCIFEROL, THIAMINE HYDROCHLORIDE, RIBOFLAVIN-5 PHOSPHATE SODIUM, PYRIDOXINE HYDROCHLORIDE, NIACINAMIDE, DEXPANTHENOL, ALPHA-TOCOPHEROL ACETATE, VITAMIN K1, FOLIC ACID, BIOTIN, CYANOCOBALAMIN: 200; 3300; 200; 6; 3.6; 6; 40; 15; 10; 150; 600; 60; 5 INJECTION, SOLUTION INTRAVENOUS at 12:07

## 2018-12-12 RX ADMIN — PROMETHAZINE HYDROCHLORIDE 12.5 MG: 25 INJECTION INTRAMUSCULAR; INTRAVENOUS at 17:39

## 2018-12-12 RX ADMIN — ALLOPURINOL 100 MG: 100 TABLET ORAL at 09:34

## 2018-12-12 RX ADMIN — SODIUM CHLORIDE 50 ML: 9 INJECTION, SOLUTION INTRAVENOUS at 21:24

## 2018-12-12 RX ADMIN — SODIUM CHLORIDE, PRESERVATIVE FREE 3 ML: 5 INJECTION INTRAVENOUS at 21:20

## 2018-12-12 RX ADMIN — METRONIDAZOLE 500 MG: 250 TABLET ORAL at 21:25

## 2018-12-12 RX ADMIN — COLCHICINE 0.6 MG: 0.6 TABLET, FILM COATED ORAL at 09:34

## 2018-12-12 RX ADMIN — DRONABINOL 5 MG: 2.5 CAPSULE ORAL at 17:39

## 2018-12-12 RX ADMIN — VANCOMYCIN HYDROCHLORIDE 1500 MG: 5 INJECTION, POWDER, LYOPHILIZED, FOR SOLUTION INTRAVENOUS at 12:07

## 2018-12-12 RX ADMIN — PANTOPRAZOLE SODIUM 40 MG: 40 TABLET, DELAYED RELEASE ORAL at 05:23

## 2018-12-12 RX ADMIN — SODIUM CHLORIDE 50 ML: 9 INJECTION, SOLUTION INTRAVENOUS at 07:40

## 2018-12-12 RX ADMIN — SODIUM CHLORIDE 50 ML: 9 INJECTION, SOLUTION INTRAVENOUS at 17:39

## 2018-12-12 RX ADMIN — ONDANSETRON 8 MG: 2 INJECTION INTRAMUSCULAR; INTRAVENOUS at 21:01

## 2018-12-12 RX ADMIN — SODIUM CHLORIDE 50 ML: 9 INJECTION, SOLUTION INTRAVENOUS at 12:03

## 2018-12-12 RX ADMIN — APIXABAN 5 MG: 5 TABLET, FILM COATED ORAL at 09:34

## 2018-12-12 RX ADMIN — CEFEPIME 2 G: 2 INJECTION, POWDER, FOR SOLUTION INTRAVENOUS at 07:36

## 2018-12-12 RX ADMIN — CEFDINIR 300 MG: 300 CAPSULE ORAL at 13:57

## 2018-12-12 RX ADMIN — PROMETHAZINE HYDROCHLORIDE 12.5 MG: 25 INJECTION INTRAMUSCULAR; INTRAVENOUS at 12:03

## 2018-12-12 RX ADMIN — CHOLECALCIFEROL TAB 10 MCG (400 UNIT) 1000 UNITS: 10 TAB at 09:34

## 2018-12-12 RX ADMIN — PANTOPRAZOLE SODIUM 40 MG: 40 TABLET, DELAYED RELEASE ORAL at 17:42

## 2018-12-12 RX ADMIN — I.V. FAT EMULSION 50 G: 20 EMULSION INTRAVENOUS at 17:40

## 2018-12-12 RX ADMIN — MORPHINE SULFATE 30 MG: 30 TABLET, EXTENDED RELEASE ORAL at 09:34

## 2018-12-12 RX ADMIN — PROMETHAZINE HYDROCHLORIDE 12.5 MG: 25 INJECTION INTRAMUSCULAR; INTRAVENOUS at 07:40

## 2018-12-12 RX ADMIN — MONTELUKAST SODIUM 10 MG: 10 TABLET, COATED ORAL at 09:34

## 2018-12-12 RX ADMIN — FLECAINIDE ACETATE 50 MG: 50 TABLET ORAL at 21:25

## 2018-12-12 RX ADMIN — FLUOXETINE 20 MG: 20 CAPSULE ORAL at 21:25

## 2018-12-12 RX ADMIN — METRONIDAZOLE 500 MG: 250 TABLET ORAL at 13:57

## 2018-12-12 RX ADMIN — MORPHINE SULFATE 30 MG: 30 TABLET, EXTENDED RELEASE ORAL at 21:25

## 2018-12-12 RX ADMIN — INSULIN ASPART 2 UNITS: 100 INJECTION, SOLUTION INTRAVENOUS; SUBCUTANEOUS at 07:36

## 2018-12-12 RX ADMIN — PROMETHAZINE HYDROCHLORIDE 12.5 MG: 25 INJECTION INTRAMUSCULAR; INTRAVENOUS at 21:19

## 2018-12-12 RX ADMIN — FLUOXETINE 20 MG: 20 CAPSULE ORAL at 09:34

## 2018-12-12 RX ADMIN — SODIUM CHLORIDE, PRESERVATIVE FREE 3 ML: 5 INJECTION INTRAVENOUS at 09:35

## 2018-12-12 RX ADMIN — CEFDINIR 300 MG: 300 CAPSULE ORAL at 21:25

## 2018-12-12 RX ADMIN — ALBUTEROL SULFATE 2.5 MG: 2.5 SOLUTION RESPIRATORY (INHALATION) at 06:31

## 2018-12-12 RX ADMIN — DRONABINOL 5 MG: 2.5 CAPSULE ORAL at 07:36

## 2018-12-12 NOTE — PROGRESS NOTES
Todd Phillips  [id number]  1950 12/12/2018    Referring Provider:   VIMAL Diaz    Reason for Followup:   Pancreatic Cancer    Patient Care Team:  Adiel Denney MD as PCP - General  Adiel Denney MD as PCP - Claims Attributed    Chief Complaint:  Nausea      Subjective:    Mr. Flynn states that his nausea has improved overall with scheduling of Phenergan and Zofran as Sancuso patch was removed yesterday. He currently remains on Marinol. He denies of any constipation at present. His wife at bedside notes that he has been having more reflux however his abdominal pain has overall improved. He continues to have low appetite and reports that the sight of food makes him nauseous. Blood cultures remain negative and he has been afebrile.      Allergies:    Contrast dye    Outpatient Medications:  Medications Prior to Admission   Medication Sig Dispense Refill Last Dose   • albuterol (PROVENTIL HFA;VENTOLIN HFA) 108 (90 BASE) MCG/ACT inhaler Inhale 2 puffs Every 4 (Four) Hours As Needed for Wheezing or Shortness of Air.   12/6/2018 at Unknown time   • albuterol (PROVENTIL) (2.5 MG/3ML) 0.083% nebulizer solution Take 2.5 mg by nebulization 2 (Two) Times a Day As Needed for Wheezing or Shortness of Air.   12/6/2018 at 1800   • allopurinol (ZYLOPRIM) 100 MG tablet Take 100 mg by mouth Daily.   12/6/2018 at 0900   • apixaban (ELIQUIS) 5 MG tablet tablet Take 1 tablet by mouth Every 12 (Twelve) Hours. 60 tablet 0 12/6/2018 at 1800   • cholecalciferol (VITAMIN D3) 1000 units tablet Take 1,000 Units by mouth Daily.   12/6/2018 at 0900   • colchicine 0.6 MG tablet Take 1 tablet by mouth Daily. 90 tablet 2 12/6/2018 at Unknown time   • dronabinol (MARINOL) 5 MG capsule Take 1 capsule by mouth 2 (Two) Times a Day Before Meals. 60 capsule 2 12/6/2018 at 1800   • flecainide (TAMBOCOR) 50 MG tablet Take 1 tablet by mouth Every 12 (Twelve) Hours. 60 tablet 0 12/6/2018 at 1800   • FLUoxetine (PROzac) 20  MG capsule Take 20 mg by mouth 2 (Two) Times a Day.   12/6/2018 at 1800   • lansoprazole (PREVACID) 30 MG capsule Take 30 mg by mouth Daily.   12/6/2018 at 0900   • linaclotide (LINZESS) 145 MCG capsule capsule Take 1 capsule by mouth Every Morning Before Breakfast. 30 capsule 3 12/6/2018 at 0900   • LORazepam (ATIVAN) 0.5 MG tablet Take one to two tablets by mouth every 4 to 6 hours for nausea. (Patient taking differently: Take 0.5 mg by mouth Every 6 (Six) Hours As Needed for Anxiety. Take one to two tablets by mouth every 4 to 6 hours for nausea.) 120 tablet 0 12/6/2018 at 1800   • mometasone (ASMANEX) 220 MCG/INH inhaler Inhale 2 puffs Every Night.   12/6/2018 at 1800   • montelukast (SINGULAIR) 10 MG tablet Take 10 mg by mouth Daily.   12/6/2018 at 0900   • Morphine (MS CONTIN) 30 MG 12 hr tablet Take 1 tablet by mouth Every 12 (Twelve) Hours. 60 tablet 0 12/6/2018 at 1800   • oxyCODONE (ROXICODONE) 5 MG immediate release tablet Take 5 mg by mouth Every 4 (Four) Hours As Needed for Moderate Pain .   12/6/2018 at 0900   • pioglitazone (ACTOS) 30 MG tablet Take 30 mg by mouth Daily.   12/6/2018 at 0900   • polyethylene glycol (MIRALAX) packet Take 17 g by mouth Daily As Needed.   12/6/2018 at 0900   • prochlorperazine (COMPAZINE) 10 MG tablet Take 1 tablet by mouth Every 6 (Six) Hours As Needed for Nausea or Vomiting. (Patient taking differently: Take 10 mg by mouth Every 4 (Four) Hours As Needed for Nausea or Vomiting.) 60 tablet 5 12/6/2018 at 1800   • Tiotropium Bromide-Olodaterol 2.5-2.5 MCG/ACT aerosol solution Inhale 2 puffs Daily.   12/6/2018 at 0900   • granisetron (SANCUSO) 3.1 MG/24HR Place 1 patch on the skin as directed by provider Every 7 (Seven) Days. 4 patch 3 12/4/2018 at unknown   • nitroglycerin (NITROSTAT) 0.4 MG SL tablet Place 0.4 mg under the tongue as needed for chest pain.   Unknown at Unknown time       Inpatient Medications:  Scheduled Meds:    allopurinol 100 mg Oral Daily   apixaban 5  mg Oral Q12H   cefepime 2 g Intravenous Q8H   cholecalciferol 1,000 Units Oral Daily   colchicine 0.6 mg Oral Daily   dronabinol 5 mg Oral BID AC   fat emulsion 250 mL Intravenous Once per day on Mon Wed Fri   And      trace minerals + multivitamin IVPB  Intravenous Once per day on Mon Wed Fri   flecainide 50 mg Oral Q12H   FLUoxetine 20 mg Oral BID   insulin aspart 0-7 Units Subcutaneous Q6H   linaclotide 145 mcg Oral QAM AC   micafungin (MYCAMINE)  mg Intravenous Q24H   mometasone 2 puff Inhalation Nightly   montelukast 10 mg Oral Daily   Morphine 30 mg Oral Q12H   ondansetron 8 mg Intravenous Q8H   pantoprazole 40 mg Oral QAM   promethazine 12.5 mg Intravenous 4x Daily   sodium chloride 3 mL Intravenous Q12H   tiotropium bromide-olodaterol 2 puff Inhalation Daily   vancomycin 1,500 mg Intravenous Q24H       Continuous Infusions:    Adult Central Clinimix TPN  Last Rate: 80 mL/hr at 12/11/18 1920   Pharmacy Consult         PRN Meds:  •  acetaminophen  •  albuterol  •  aluminum-magnesium hydroxide-simethicone  •  dextrose  •  dextrose  •  glucagon (human recombinant)  •  LORazepam  •  magnesium sulfate **OR** magnesium sulfate **OR** magnesium sulfate  •  Morphine  •  nitroglycerin  •  oxyCODONE  •  Pharmacy Consult  •  polyethylene glycol  •  potassium chloride **OR** potassium chloride **OR** potassium chloride  •  [COMPLETED] Insert peripheral IV **AND** sodium chloride  •  sodium chloride      Review of Systems:  A comprehensive 14 point review of systems was performed.  Significant findings as mentioned above.  All other systems reviewed and are negative.      Physical Exam:  Vital Signs: These were reviewed and listed as per patient’s electronic medical chart  Vitals:    12/12/18 0709   BP: 118/77   Pulse: 77   Resp: 16   Temp: 97.4 °F (36.3 °C)   SpO2: 97%     General: Awake, alert and oriented, in no distress, chronically ill appearing  HEENT: Head is atraumatic, normocephalic, extraocular movements  full, oropharynx clear, no scleral icterus, pink moist mucous membranes  Neck: no jvd or masses  Cardiovascular: regular rate and rhythm without murmurs, rubs or gallops  Pulmonary: non-labored, clear to auscultation bilaterally, no wheezing  Abdomen: soft, non-tender, non-distended, normal active bowel sounds present  Extremities: No clubbing, cyanosis or edema  Neurologic: Mental status as above, alert, awake and oriented, grossly non-focal exam  Skin: warm, dry, intact, right PAC in place      Labs / Studies:  Lab Results (last 72 hours)     Procedure Component Value Units Date/Time    POC Glucose Once [[id number]]  (Abnormal) Collected:  12/12/18 0647    Specimen:  Blood Updated:  12/12/18 0654     Glucose 151 mg/dL     CBC & Differential [[id number]] Collected:  12/12/18 0445    Specimen:  Blood Updated:  12/12/18 0542    Narrative:       The following orders were created for panel order CBC & Differential.  Procedure                               Abnormality         Status                     ---------                               -----------         ------                     Manual Differential[[id number]]                              Final result               Scan Slide[[id number]]                                       Final result               CBC Auto Differential[[id number]]        Abnormal            Final result                 Please view results for these tests on the individual orders.    CBC Auto Differential [[id number]]  (Abnormal) Collected:  12/12/18 0445    Specimen:  Blood Updated:  12/12/18 0542     WBC 7.35 10*3/mm3      RBC 3.72 10*6/mm3      Hemoglobin 11.0 g/dL      Hematocrit 34.4 %      MCV 92.5 fL      MCH 29.6 pg      MCHC 32.0 g/dL      RDW 16.4 %      RDW-SD 53.1 fl      MPV 10.1 fL      Platelets 189 10*3/mm3     Scan Slide [[id number]] Collected:  12/12/18 0445    Specimen:  Blood Updated:  12/12/18 0542     Scan Slide --     Comment: See Manual Differential Results       Manual  Differential [[id number]] Collected:  12/12/18 0445    Specimen:  Blood Updated:  12/12/18 0542     Neutrophil % 59.0 %      Lymphocyte % 26.0 %      Monocyte % 7.0 %      Eosinophil % 5.0 %      Bands %  3.0 %      Neutrophils Absolute 4.56 10*3/mm3      Lymphocytes Absolute 1.91 10*3/mm3      Monocytes Absolute 0.51 10*3/mm3      Eosinophils Absolute 0.37 10*3/mm3      Anisocytosis Slight/1+     Hypochromia Slight/1+     Platelet Morphology Normal    C-reactive Protein [[id number]]  (Normal) Collected:  12/12/18 0445    Specimen:  Blood Updated:  12/12/18 0537     C-Reactive Protein <0.50 mg/dL     Comprehensive Metabolic Panel [[id number]]  (Abnormal) Collected:  12/12/18 0445    Specimen:  Blood Updated:  12/12/18 0535     Glucose 137 mg/dL      BUN 25 mg/dL      Creatinine 1.16 mg/dL      Sodium 133 mmol/L      Potassium 4.1 mmol/L      Chloride 104 mmol/L      CO2 23.5 mmol/L      Calcium 9.1 mg/dL      Total Protein 6.3 g/dL      Albumin 3.50 g/dL      ALT (SGPT) 15 U/L      AST (SGOT) 21 U/L      Alkaline Phosphatase 85 U/L      Comment: Note New Reference Ranges        Total Bilirubin 0.4 mg/dL      eGFR Non African Amer 62 mL/min/1.73      Globulin 2.8 gm/dL      A/G Ratio 1.3 g/dL      BUN/Creatinine Ratio 21.6     Anion Gap 5.5 mmol/L     Osmolality, Calculated [[id number]]  (Abnormal) Collected:  12/12/18 0445    Specimen:  Blood Updated:  12/12/18 0535     Osmolality Calc 272.9 mOsm/kg     Magnesium [[id number]]  (Normal) Collected:  12/12/18 0445    Specimen:  Blood Updated:  12/12/18 0535     Magnesium 2.1 mg/dL     Blood Culture - Blood, Arm, Right [[id number]] Collected:  12/06/18 2312    Specimen:  Blood from Arm, Right Updated:  12/11/18 2346     Blood Culture No growth at 5 days    Blood Culture - Blood, Arm, Left [[id number]] Collected:  12/06/18 2305    Specimen:  Blood from Arm, Left Updated:  12/11/18 2346     Blood Culture No growth at 5 days    POC Glucose Once [[id number]]  (Abnormal) Collected:   12/11/18 2218    Specimen:  Blood Updated:  12/11/18 2229     Glucose 161 mg/dL     POC Glucose Once [[id number]]  (Abnormal) Collected:  12/11/18 1631    Specimen:  Blood Updated:  12/11/18 1646     Glucose 144 mg/dL     POC Glucose Once [[id number]]  (Abnormal) Collected:  12/11/18 1056    Specimen:  Blood Updated:  12/11/18 1107     Glucose 159 mg/dL     POC Glucose Once [[id number]]  (Abnormal) Collected:  12/11/18 0528    Specimen:  Blood Updated:  12/11/18 0535     Glucose 141 mg/dL     C-reactive Protein [[id number]]  (Normal) Collected:  12/11/18 0435    Specimen:  Blood Updated:  12/11/18 0520     C-Reactive Protein 0.53 mg/dL     Vancomycin, Trough [[id number]]  (Abnormal) Collected:  12/11/18 0435    Specimen:  Blood Updated:  12/11/18 0517     Vancomycin Trough 22.00 mcg/mL     Comprehensive Metabolic Panel [[id number]]  (Abnormal) Collected:  12/11/18 0435    Specimen:  Blood Updated:  12/11/18 0512     Glucose 143 mg/dL      BUN 28 mg/dL      Creatinine 1.27 mg/dL      Sodium 134 mmol/L      Potassium 4.0 mmol/L      Chloride 101 mmol/L      CO2 27.6 mmol/L      Calcium 9.2 mg/dL      Total Protein 6.2 g/dL      Albumin 3.60 g/dL      ALT (SGPT) 13 U/L      AST (SGOT) 18 U/L      Alkaline Phosphatase 83 U/L      Comment: Note New Reference Ranges        Total Bilirubin 0.2 mg/dL      eGFR Non African Amer 56 mL/min/1.73      Globulin 2.6 gm/dL      A/G Ratio 1.4 g/dL      BUN/Creatinine Ratio 22.0     Anion Gap 5.4 mmol/L     Osmolality, Calculated [[id number]]  (Normal) Collected:  12/11/18 0435    Specimen:  Blood Updated:  12/11/18 0512     Osmolality Calc 276.2 mOsm/kg     Magnesium [[id number]]  (Normal) Collected:  12/11/18 0435    Specimen:  Blood Updated:  12/11/18 0512     Magnesium 1.8 mg/dL     Phosphorus [[id number]]  (Normal) Collected:  12/11/18 0435    Specimen:  Blood Updated:  12/11/18 0512     Phosphorus 4.4 mg/dL     CBC & Differential [[id number]] Collected:  12/11/18 0435    Specimen:  Blood  Updated:  12/11/18 0455    Narrative:       The following orders were created for panel order CBC & Differential.  Procedure                               Abnormality         Status                     ---------                               -----------         ------                     Scan Slide[[id number]]                                       Final result               CBC Auto Differential[[id number]]        Abnormal            Final result                 Please view results for these tests on the individual orders.    Scan Slide [[id number]] Collected:  12/11/18 0435    Specimen:  Blood Updated:  12/11/18 0455    CBC Auto Differential [[id number]]  (Abnormal) Collected:  12/11/18 0435    Specimen:  Blood Updated:  12/11/18 0455     WBC 7.97 10*3/mm3      RBC 3.64 10*6/mm3      Hemoglobin 10.7 g/dL      Hematocrit 32.8 %      MCV 90.1 fL      MCH 29.4 pg      MCHC 32.6 g/dL      RDW 16.2 %      RDW-SD 53.2 fl      MPV 10.1 fL      Platelets 197 10*3/mm3      Neutrophil % 56.1 %      Lymphocyte % 19.8 %      Monocyte % 14.4 %      Eosinophil % 5.4 %      Basophil % 0.4 %      Immature Grans % 3.9 %      Neutrophils, Absolute 4.47 10*3/mm3      Lymphocytes, Absolute 1.58 10*3/mm3      Monocytes, Absolute 1.15 10*3/mm3      Eosinophils, Absolute 0.43 10*3/mm3      Basophils, Absolute 0.03 10*3/mm3      Immature Grans, Absolute 0.31 10*3/mm3     POC Glucose Once [[id number]]  (Abnormal) Collected:  12/10/18 2306    Specimen:  Blood Updated:  12/10/18 2312     Glucose 145 mg/dL     Cancer Antigen 19-9 [[id number]] Collected:  12/10/18 1810    Specimen:  Blood Updated:  12/10/18 1909    POC Glucose Once [[id number]]  (Abnormal) Collected:  12/10/18 1639    Specimen:  Blood Updated:  12/10/18 1648     Glucose 141 mg/dL     POC Glucose Once [[id number]]  (Abnormal) Collected:  12/10/18 1057    Specimen:  Blood Updated:  12/10/18 1104     Glucose 156 mg/dL     POC Glucose Once [[id number]]  (Abnormal) Collected:  12/10/18 3198     Specimen:  Blood Updated:  12/10/18 0756     Glucose 139 mg/dL     Basic Metabolic Panel [[id number]]  (Abnormal) Collected:  12/10/18 0453    Specimen:  Blood Updated:  12/10/18 0623     Glucose 134 mg/dL      BUN 32 mg/dL      Creatinine 1.22 mg/dL      Sodium 135 mmol/L      Potassium 4.4 mmol/L      Chloride 101 mmol/L      CO2 27.0 mmol/L      Calcium 8.7 mg/dL      eGFR Non African Amer 59 mL/min/1.73      BUN/Creatinine Ratio 26.2     Anion Gap 7.0 mmol/L     Narrative:       GFR Normal >60  Chronic Kidney Disease <60  Kidney Failure <15    C-reactive Protein [[id number]]  (Normal) Collected:  12/10/18 0453    Specimen:  Blood Updated:  12/10/18 0623     C-Reactive Protein 0.67 mg/dL     Osmolality, Calculated [[id number]]  (Normal) Collected:  12/10/18 0453    Specimen:  Blood Updated:  12/10/18 0623     Osmolality Calc 279.0 mOsm/kg     CBC & Differential [[id number]] Collected:  12/10/18 0453    Specimen:  Blood Updated:  12/10/18 0557    Narrative:       The following orders were created for panel order CBC & Differential.  Procedure                               Abnormality         Status                     ---------                               -----------         ------                     CBC Auto Differential[[id number]]        Abnormal            Final result                 Please view results for these tests on the individual orders.    CBC Auto Differential [[id number]]  (Abnormal) Collected:  12/10/18 0453    Specimen:  Blood Updated:  12/10/18 0557     WBC 8.18 10*3/mm3      RBC 3.61 10*6/mm3      Hemoglobin 10.5 g/dL      Hematocrit 33.4 %      MCV 92.5 fL      MCH 29.1 pg      MCHC 31.4 g/dL      RDW 16.3 %      RDW-SD 52.4 fl      MPV 10.5 fL      Platelets 217 10*3/mm3      Neutrophil % 58.9 %      Lymphocyte % 17.5 %      Monocyte % 16.7 %      Eosinophil % 3.5 %      Basophil % 0.5 %      Immature Grans % 2.9 %      Neutrophils, Absolute 4.81 10*3/mm3      Lymphocytes, Absolute 1.43  10*3/mm3      Monocytes, Absolute 1.37 10*3/mm3      Eosinophils, Absolute 0.29 10*3/mm3      Basophils, Absolute 0.04 10*3/mm3      Immature Grans, Absolute 0.24 10*3/mm3     POC Glucose Once [[id number]]  (Abnormal) Collected:  12/09/18 2227    Specimen:  Blood Updated:  12/09/18 2235     Glucose 133 mg/dL     POC Glucose Once [[id number]]  (Abnormal) Collected:  12/09/18 1642    Specimen:  Blood Updated:  12/09/18 1647     Glucose 160 mg/dL     POC Glucose Once [[id number]]  (Abnormal) Collected:  12/09/18 1225    Specimen:  Blood Updated:  12/09/18 1231     Glucose 141 mg/dL     Urine Culture - Urine, Urine, Clean Catch [[id number]]  (Normal) Collected:  12/06/18 2342    Specimen:  Urine, Clean Catch Updated:  12/09/18 1058     Urine Culture No growth            Imaging Results (last 72 hours)     Procedure Component Value Units Date/Time    CT Chest Without Contrast [[id number]] Collected:  12/10/18 0728     Updated:  12/10/18 0732    Narrative:       EXAM: CT CHEST WO CONTRAST-              CLINICAL INDICATION:R/O pneumonia. XRAY with consolidation; R50.9-Fever,  unspecified      COMPARISON: COMPARISON: 9/11/2018     TECHNIQUE: Multiple axial CT images were obtained from lung apex through  upper abdomen WITHOUT administration of IV contrast. Reformatted images  in the coronal and/or sagittal plane(s) were generated from the axial  data set to facilitate diagnostic accuracy and/or surgical planning.  Oral Contrast:NONE.     Radiation dose reduction techniques were utilized per ALARA protocol.  Automated exposure control was initiated through either or CareDoUnblab or  DoseRigPlink software packages by  protocol.    DOSE (DLP mGy-cm): 516.01 mGy.cm        FINDINGS:     LUNGS: CHRONIC APPEARING INTERSTITIAL LUNG CHANGES ARE NOTED WITH  UNDERLYING MILD CENTRILOBULAR EMPHYSEMA. SUBPLEURAL AND PERIPHERAL  FIBROSIS OF THE LOWER LUNG ZONES NOTED. NO AIRSPACE CONSOLIDATIONS OR  SUSPICIOUS PULMONARY NODULES/MASSES  IDENTIFIED.     HEART: MODERATE CARDIOMEGALY WITH MODERATE CORONARY VASCULAR  CALCIFICATIONS.     PERICARDIUM: No effusion.     MEDIASTINUM: No masses. No enlarged lymph nodes.  No fluid collections.     PLEURA: No pleural effusion. No pleural mass or abnormal calcification.  No pneumothorax.     MAJOR AIRWAYS: Clear. No intrinsic mass.     VASCULATURE: No evidence of aneurysm.     VISUALIZED UPPER ABDOMEN:SINCE THE PREVIOUS EXAM, THERE HAS BEEN  INTERVAL PLACEMENT OF BILIARY STENT WHICH LIKELY ACCOUNTS FOR PRESENCE  OF PNEUMOBILIA WITHIN THE LIVER.     OTHER: None.     BONES: DEGENERATIVE CHANGES THORACIC SPINE BUT NO ACUTE BONY FINDINGS.       Impression:       1. NO SIGNIFICANT CHANGE IN THE APPEARANCE OF THE CHEST AGAIN  DEMONSTRATING CHRONIC LUNG CHANGES WITH MILD BASILAR SUBPLEURAL  FIBROSIS.  2. STABLE CARDIOMEGALY.  3. INTERVAL PLACEMENT OF BILIARY STENT WHICH ACCOUNTS FOR PNEUMOBILIA.     This report was finalized on 12/10/2018 7:30 AM by Dr. Klever Paredes MD.       XR Chest PA & Lateral [[id number]] Collected:  12/09/18 1115     Updated:  12/09/18 1118    Narrative:       EXAMINATION: XR CHEST PA AND LATERAL-      CLINICAL INDICATION: r/o pneumonia; R50.9-Fever, unspecified          COMPARISON: 12/7/2018      TECHNIQUE: XR CHEST PA AND LATERAL-      FINDINGS:   PowerPort is stable in the superior vena cava   Atelectasis in the left lung base  Equivocal pneumonia in the right infrahilar region   No pneumothorax.   Bony and soft tissue structures are unremarkable.            Impression:       Right infrahilar consolidation     This report was finalized on 12/9/2018 11:16 AM by Dr. Ernesto Bell MD.                   Assessment & Plan:  Mr. Phillips is a 69 y/o male with    1. Pancreatic Cancer  - In regards to Mr. Phillips's oncology history, please see last follow up note by VIMAL Rolle dated on 12/4/18 for full details.   - He was recently started on neoadjuvant treatment with Gemzar/Abraxane and  completed his first cycle in mid-November.   - Unfortunately, he has experienced ongoing nausea/vomiting and dehydration and as a result, has very poor nutrition in which he was started on TPN per Dr. Hale's recommendations couple of weeks ago and during his last clinic visit was feeling much better.   - Chemotherapy had been on hold and he was to follow with Dr. Hale today with repeat imaging. Unfortunately, he has been admitted with sepsis.   - Dr. Alves discussed his case with Dr. Hale and plan will be to reschedule his follow up appointment to next week to re-evaluate his performance status and symptoms. Dr. Hale's office to schedule this with the family. In the interim at discharge would prescribe prophylactic antibiotics for the prevention of cholangitis and would consider Flagyl versus Cipro/Flagyl and ID recommendations on this would be greatly appreciated.  - Will continue with supportive care for now and continue to closely monitor.     2. Nausea:  - This appears to be currently controlled and as per patient has improved since yesterday with scheduling of Phenergan and Zofran (since sancuso patch was to be changed), and compazine was discontinued. If patient has better relief with phenergan this can be prescribed as an outpatient and they can discontinue compazine  - He was also advised to request Ativan half hour prior to meals to help relieve anxiety induced nausea with food and he does have this as needed every 6 hours and can be increased to every 4 hours as this was prescribed at home if patient is not lethargic.   - He will continue on Marinol 5mg BID which is his home dose.  - I have also increased his Pepcid to BID as he notes worsening reflux symptoms which can contribute to nausea.  - Appreciate assistance of palliative care.     3. Neoplasm related pain:  - Currently controlled with MSContin 30 mg BID and oxycodone prn. Will monitor. He denies of any pain this am     4.  Sepsis:   - Being managed by primary team and ID.  - Dr. Hale has also recommended prophylaxis against cholangitis at discharge, would appreciate ID assistance.                Faiza Toro MD  12/12/18  8:41 AM

## 2018-12-12 NOTE — PROGRESS NOTES
"  Assisted By: Wife and Heidi BEE    CC: F/U nausea    Interview History/HPI: Patient states he continues to feel better.  Less nausea, tolerating some of his diet, he states his abdominal pain has \"eased\".  No bowel movement as of yet today.  He does feel like he is getting stronger.  No fever overnight.      Vitals:    12/12/18 1100   BP: 109/70   Pulse: 82   Resp: 16   Temp: 98.1 °F (36.7 °C)   SpO2:        Intake/Output Summary (Last 24 hours) at 12/12/2018 1245  Last data filed at 12/12/2018 1100  Gross per 24 hour   Intake 1060 ml   Output --   Net 1060 ml       EXAM: He does appear to be improved, mood is good he is more alert lungs bilateral breath sounds are clear without rhonchi rales or wheezing, heart regular rate and rhythm without murmur or gallop.  Abdomen overall is a benign exam minimal tenderness bowel sounds are active nondistended.  No edema.    Tele: Sinus, reviewed    LABS:   Lab Results (last 48 hours)     Procedure Component Value Units Date/Time    POC Glucose Once [[id number]]  (Abnormal) Collected:  12/12/18 1125    Specimen:  Blood Updated:  12/12/18 1143     Glucose 137 mg/dL     POC Glucose Once [[id number]]  (Abnormal) Collected:  12/12/18 0647    Specimen:  Blood Updated:  12/12/18 0654     Glucose 151 mg/dL     CBC & Differential [[id number]] Collected:  12/12/18 0445    Specimen:  Blood Updated:  12/12/18 0542    Narrative:       The following orders were created for panel order CBC & Differential.  Procedure                               Abnormality         Status                     ---------                               -----------         ------                     Manual Differential[[id number]]                              Final result               Scan Slide[[id number]]                                       Final result               CBC Auto Differential[[id number]]        Abnormal            Final result                 Please view results for these tests on the individual " orders.    CBC Auto Differential [[id number]]  (Abnormal) Collected:  12/12/18 0445    Specimen:  Blood Updated:  12/12/18 0542     WBC 7.35 10*3/mm3      RBC 3.72 10*6/mm3      Hemoglobin 11.0 g/dL      Hematocrit 34.4 %      MCV 92.5 fL      MCH 29.6 pg      MCHC 32.0 g/dL      RDW 16.4 %      RDW-SD 53.1 fl      MPV 10.1 fL      Platelets 189 10*3/mm3     Scan Slide [[id number]] Collected:  12/12/18 0445    Specimen:  Blood Updated:  12/12/18 0542     Scan Slide --     Comment: See Manual Differential Results       Manual Differential [[id number]] Collected:  12/12/18 0445    Specimen:  Blood Updated:  12/12/18 0542     Neutrophil % 59.0 %      Lymphocyte % 26.0 %      Monocyte % 7.0 %      Eosinophil % 5.0 %      Bands %  3.0 %      Neutrophils Absolute 4.56 10*3/mm3      Lymphocytes Absolute 1.91 10*3/mm3      Monocytes Absolute 0.51 10*3/mm3      Eosinophils Absolute 0.37 10*3/mm3      Anisocytosis Slight/1+     Hypochromia Slight/1+     Platelet Morphology Normal    C-reactive Protein [[id number]]  (Normal) Collected:  12/12/18 0445    Specimen:  Blood Updated:  12/12/18 0537     C-Reactive Protein <0.50 mg/dL     Comprehensive Metabolic Panel [[id number]]  (Abnormal) Collected:  12/12/18 0445    Specimen:  Blood Updated:  12/12/18 0535     Glucose 137 mg/dL      BUN 25 mg/dL      Creatinine 1.16 mg/dL      Sodium 133 mmol/L      Potassium 4.1 mmol/L      Chloride 104 mmol/L      CO2 23.5 mmol/L      Calcium 9.1 mg/dL      Total Protein 6.3 g/dL      Albumin 3.50 g/dL      ALT (SGPT) 15 U/L      AST (SGOT) 21 U/L      Alkaline Phosphatase 85 U/L      Comment: Note New Reference Ranges        Total Bilirubin 0.4 mg/dL      eGFR Non African Amer 62 mL/min/1.73      Globulin 2.8 gm/dL      A/G Ratio 1.3 g/dL      BUN/Creatinine Ratio 21.6     Anion Gap 5.5 mmol/L     Osmolality, Calculated [[id number]]  (Abnormal) Collected:  12/12/18 0445    Specimen:  Blood Updated:  12/12/18 0535     Osmolality Calc 272.9 mOsm/kg      Magnesium [[id number]]  (Normal) Collected:  12/12/18 0445    Specimen:  Blood Updated:  12/12/18 0535     Magnesium 2.1 mg/dL     Blood Culture - Blood, Arm, Right [[id number]] Collected:  12/06/18 2312    Specimen:  Blood from Arm, Right Updated:  12/11/18 2346     Blood Culture No growth at 5 days    Blood Culture - Blood, Arm, Left [[id number]] Collected:  12/06/18 2305    Specimen:  Blood from Arm, Left Updated:  12/11/18 2346     Blood Culture No growth at 5 days    POC Glucose Once [[id number]]  (Abnormal) Collected:  12/11/18 2218    Specimen:  Blood Updated:  12/11/18 2229     Glucose 161 mg/dL     POC Glucose Once [[id number]]  (Abnormal) Collected:  12/11/18 1631    Specimen:  Blood Updated:  12/11/18 1646     Glucose 144 mg/dL     POC Glucose Once [[id number]]  (Abnormal) Collected:  12/11/18 1056    Specimen:  Blood Updated:  12/11/18 1107     Glucose 159 mg/dL     POC Glucose Once [[id number]]  (Abnormal) Collected:  12/11/18 0528    Specimen:  Blood Updated:  12/11/18 0535     Glucose 141 mg/dL     C-reactive Protein [[id number]]  (Normal) Collected:  12/11/18 0435    Specimen:  Blood Updated:  12/11/18 0520     C-Reactive Protein 0.53 mg/dL     Vancomycin, Trough [[id number]]  (Abnormal) Collected:  12/11/18 0435    Specimen:  Blood Updated:  12/11/18 0517     Vancomycin Trough 22.00 mcg/mL     Comprehensive Metabolic Panel [[id number]]  (Abnormal) Collected:  12/11/18 0435    Specimen:  Blood Updated:  12/11/18 0512     Glucose 143 mg/dL      BUN 28 mg/dL      Creatinine 1.27 mg/dL      Sodium 134 mmol/L      Potassium 4.0 mmol/L      Chloride 101 mmol/L      CO2 27.6 mmol/L      Calcium 9.2 mg/dL      Total Protein 6.2 g/dL      Albumin 3.60 g/dL      ALT (SGPT) 13 U/L      AST (SGOT) 18 U/L      Alkaline Phosphatase 83 U/L      Comment: Note New Reference Ranges        Total Bilirubin 0.2 mg/dL      eGFR Non African Amer 56 mL/min/1.73      Globulin 2.6 gm/dL      A/G Ratio 1.4 g/dL      BUN/Creatinine  Ratio 22.0     Anion Gap 5.4 mmol/L     Osmolality, Calculated [[id number]]  (Normal) Collected:  12/11/18 0435    Specimen:  Blood Updated:  12/11/18 0512     Osmolality Calc 276.2 mOsm/kg     Magnesium [[id number]]  (Normal) Collected:  12/11/18 0435    Specimen:  Blood Updated:  12/11/18 0512     Magnesium 1.8 mg/dL     Phosphorus [[id number]]  (Normal) Collected:  12/11/18 0435    Specimen:  Blood Updated:  12/11/18 0512     Phosphorus 4.4 mg/dL     CBC & Differential [[id number]] Collected:  12/11/18 0435    Specimen:  Blood Updated:  12/11/18 0455    Narrative:       The following orders were created for panel order CBC & Differential.  Procedure                               Abnormality         Status                     ---------                               -----------         ------                     Scan Slide[[id number]]                                       Final result               CBC Auto Differential[[id number]]        Abnormal            Final result                 Please view results for these tests on the individual orders.    Scan Slide [[id number]] Collected:  12/11/18 0435    Specimen:  Blood Updated:  12/11/18 0455    CBC Auto Differential [[id number]]  (Abnormal) Collected:  12/11/18 0435    Specimen:  Blood Updated:  12/11/18 0455     WBC 7.97 10*3/mm3      RBC 3.64 10*6/mm3      Hemoglobin 10.7 g/dL      Hematocrit 32.8 %      MCV 90.1 fL      MCH 29.4 pg      MCHC 32.6 g/dL      RDW 16.2 %      RDW-SD 53.2 fl      MPV 10.1 fL      Platelets 197 10*3/mm3      Neutrophil % 56.1 %      Lymphocyte % 19.8 %      Monocyte % 14.4 %      Eosinophil % 5.4 %      Basophil % 0.4 %      Immature Grans % 3.9 %      Neutrophils, Absolute 4.47 10*3/mm3      Lymphocytes, Absolute 1.58 10*3/mm3      Monocytes, Absolute 1.15 10*3/mm3      Eosinophils, Absolute 0.43 10*3/mm3      Basophils, Absolute 0.03 10*3/mm3      Immature Grans, Absolute 0.31 10*3/mm3     POC Glucose Once [[id number]]  (Abnormal)  Collected:  12/10/18 2306    Specimen:  Blood Updated:  12/10/18 2312     Glucose 145 mg/dL     Cancer Antigen 19-9 [[id number]] Collected:  12/10/18 1810    Specimen:  Blood Updated:  12/10/18 1909    POC Glucose Once [[id number]]  (Abnormal) Collected:  12/10/18 1639    Specimen:  Blood Updated:  12/10/18 1648     Glucose 141 mg/dL           Radiology:  Imaging Results (last 72 hours)     Procedure Component Value Units Date/Time    CT Chest Without Contrast [[id number]] Collected:  12/10/18 0728     Updated:  12/10/18 0732    Narrative:       EXAM: CT CHEST WO CONTRAST-              CLINICAL INDICATION:R/O pneumonia. XRAY with consolidation; R50.9-Fever,  unspecified      COMPARISON: COMPARISON: 9/11/2018     TECHNIQUE: Multiple axial CT images were obtained from lung apex through  upper abdomen WITHOUT administration of IV contrast. Reformatted images  in the coronal and/or sagittal plane(s) were generated from the axial  data set to facilitate diagnostic accuracy and/or surgical planning.  Oral Contrast:NONE.     Radiation dose reduction techniques were utilized per ALARA protocol.  Automated exposure control was initiated through either or CareDose or  DoseRight software packages by  protocol.    DOSE (DLP mGy-cm): 516.01 mGy.cm        FINDINGS:     LUNGS: CHRONIC APPEARING INTERSTITIAL LUNG CHANGES ARE NOTED WITH  UNDERLYING MILD CENTRILOBULAR EMPHYSEMA. SUBPLEURAL AND PERIPHERAL  FIBROSIS OF THE LOWER LUNG ZONES NOTED. NO AIRSPACE CONSOLIDATIONS OR  SUSPICIOUS PULMONARY NODULES/MASSES IDENTIFIED.     HEART: MODERATE CARDIOMEGALY WITH MODERATE CORONARY VASCULAR  CALCIFICATIONS.     PERICARDIUM: No effusion.     MEDIASTINUM: No masses. No enlarged lymph nodes.  No fluid collections.     PLEURA: No pleural effusion. No pleural mass or abnormal calcification.  No pneumothorax.     MAJOR AIRWAYS: Clear. No intrinsic mass.     VASCULATURE: No evidence of aneurysm.     VISUALIZED UPPER ABDOMEN:SINCE THE  PREVIOUS EXAM, THERE HAS BEEN  INTERVAL PLACEMENT OF BILIARY STENT WHICH LIKELY ACCOUNTS FOR PRESENCE  OF PNEUMOBILIA WITHIN THE LIVER.     OTHER: None.     BONES: DEGENERATIVE CHANGES THORACIC SPINE BUT NO ACUTE BONY FINDINGS.       Impression:       1. NO SIGNIFICANT CHANGE IN THE APPEARANCE OF THE CHEST AGAIN  DEMONSTRATING CHRONIC LUNG CHANGES WITH MILD BASILAR SUBPLEURAL  FIBROSIS.  2. STABLE CARDIOMEGALY.  3. INTERVAL PLACEMENT OF BILIARY STENT WHICH ACCOUNTS FOR PNEUMOBILIA.     This report was finalized on 12/10/2018 7:30 AM by Dr. Klever Paredes MD.               Results for orders placed during the hospital encounter of 10/22/17   Adult Transthoracic Echo Complete W/ Cont if Necessary Per Protocol    Narrative · Left ventricular systolic function is normal. Estimated EF = 60%.  · The cardiac valves are anatomically and functionally normal.            Assessment/Plan:   Pancreatic cancer, discussed with Dr. Alves who is discussed with Dr. Hale oncologic surgeon.  Because the patient is improving, plan is to hopefully get the patient home and he will follow-up as an outpatient with surgeon.  Possibly plan for [facility] early January.    Weakness, this is most likely infectious process and although no definite infectious process has been found he has clinically improved significantly since antibiotics were started.  I discussed with Dr. Alves.  She had discussed with surgeon about suppressive therapy on antibiotics for this and in his biliary system.  We discussed Cipro and Flagyl however because the patient is on Tambocor and this can have a major QTC interaction I have changed the patient to Omnicef and Flagyl.  If he tolerates this, would plan discharge home tomorrow and get home health with physical therapy and then outpatient follow-up.    History of A. fib, maintaining sinus rhythm on Tambocor, on Eliquis for stroke prevention.     Pain from pancreatic cancer, improving    On scheduled Zofran,  will check QTC in the a.m.    Mild hyponatremia

## 2018-12-12 NOTE — THERAPY TREATMENT NOTE
Acute Care - Occupational Therapy Treatment Note   Jameel     Patient Name: Todd Phillips  : 1950  MRN: [id number]  Today's Date: 2018  Onset of Illness/Injury or Date of Surgery: 18     Referring Physician: Dr. Valdez    Admit Date: 2018       ICD-10-CM ICD-9-CM   1. Fever, unspecified fever cause R50.9 780.60     Patient Active Problem List   Diagnosis   • Hyperlipidemia   • COPD (chronic obstructive pulmonary disease) (CMS/[facility])   • Essential hypertension   • Gout without tophus   • Anxiety and depression   • Primary insomnia   • Gastroesophageal reflux disease without esophagitis   • Nonobstructive atherosclerosis of coronary artery   • CKD stage 3 secondary to diabetes (CMS/[facility])   • DM2 (diabetes mellitus, type 2) (CMS/[facility])   • Paroxysmal atrial fibrillation   • Chronic anticoagulation, eliquis   • Encounter for monitoring flecainide therapy   • Malignant neoplasm of head of pancreas (CMS/[facility])   • Bacteremia due to Escherichia coli   • Biliary obstruction   • Thrombocytopenia (CMS/[facility])   • Anemia due to chemotherapy   • Fever     Past Medical History:   Diagnosis Date   • Antral gastritis    • Asthma    • Atrial fibrillation (CMS/HCC)     treated with oral blood thinner   • Chest pain    • COPD (chronic obstructive pulmonary disease) (CMS/[facility])    • Coronary artery disease     no stents   • Diabetes mellitus (CMS/HCC)     type 2    • Duodenitis    • Elevated cholesterol    • Emphysema of lung (CMS/HCC)    • Gallbladder disease     removed    • GERD (gastroesophageal reflux disease)    • Hyperlipidemia    • Hypertension    • Jaundice    • Kidney stone    • Kidney stones     had lithotripsy and passed 36 kidney stones as well as had one surgically removed.   • Malignant neoplasm of head of pancreas (CMS/HCC) 2018   • Palpitations    • Pneumonia 2018   • Reflux esophagitis    • Renal failure     stage 3 kidney disease   • Sepsis (CMS/HCC)      Past Surgical History:   Procedure  Laterality Date   • BILE DUCT STENT PLACEMENT      [facility] 2 weeks ago    • CARDIAC CATHETERIZATION  11/01/1999   • CARDIOVASCULAR STRESS TEST  09/2012   • COLONOSCOPY     • CYSTOSCOPY BLADDER STONE LITHOTRIPSY     • ECHO - CONVERTED  09/2012   • KIDNEY STONE SURGERY     • KIDNEY STONE SURGERY      open abdominal surgery   • UPPER GASTROINTESTINAL ENDOSCOPY  08/30/2012       Therapy Treatment    Rehabilitation Treatment Summary     Row Name 12/12/18 1436 12/12/18 1414          Treatment Time/Intention    Discipline  occupational therapist  -KH  physical therapy assistant  -KB     Document Type  therapy note (daily note)  -  therapy note (daily note)  -KB     Subjective Information  no complaints  -  no complaints  -KB     Mode of Treatment  occupational therapy;individual therapy  -  individual therapy;physical therapy  -KB     Patient/Family Observations  Pt sitting up in chair with wife in room; agreeable to OT treatment  -ARSALAN  Pt. sitting in chair in no apparent signs of distress; wife present. Both agreeable to PT tx on this date.  -KB     Therapy Frequency (PT Clinical Impression)  --  other (see comments) 3-5 times/ week per priority policy  -KB     Patient Effort  good  -  good  -KB     Existing Precautions/Restrictions  fall  -KH  fall  -KB     Patient Response to Treatment  Pt tolerated OT treatment well this date w/ rest as needed  -  Pt. tolerated tx well with no adverse effects noted; gait distance increased/  -KB     Recorded by [KH] Tabatha Myles, OT 12/12/18 1439 [KB] Sully Jauregui, PTA 12/12/18 1417     Row Name 12/12/18 1436 12/12/18 1414          Cognitive Assessment/Intervention- PT/OT    Orientation Status (Cognition)  oriented x 3  -KH  oriented x 3  -KB     Follows Commands (Cognition)  follows one step commands;WFL  -KH  follows one step commands;WFL  -KB     Recorded by [KH] Tabatha Myles, OT 12/12/18 1439 [KB] Sully Jauregui, PTA 12/12/18  1417     Row Name 12/12/18 1414             Safety Issues, Functional Mobility    Safety Issues Affecting Function (Mobility)  awareness of need for assistance;safety precaution awareness;safety precautions follow-through/compliance  -KB      Impairments Affecting Function (Mobility)  balance;coordination;endurance/activity tolerance;pain;strength  -KB      Recorded by [KB] Sully Jauregui, PTA 12/12/18 1417      Row Name 12/12/18 1414             Mobility Assessment/Intervention    Extremity Weight-bearing Status  left lower extremity;right lower extremity  -KB      Left Lower Extremity (Weight-bearing Status)  weight-bearing as tolerated (WBAT)  -KB      Right Lower Extremity (Weight-bearing Status)  weight-bearing as tolerated (WBAT)  -KB      Recorded by [KB] Sully Jauregui, PTA 12/12/18 1417      Row Name 12/12/18 1414             Bed Mobility Assessment/Treatment    Comment (Bed Mobility)  Not observed on this date.  -KB      Recorded by [KB] Sully Jauregui, PTA 12/12/18 1417      Row Name 12/12/18 1414             Transfer Assessment/Treatment    Transfer Assessment/Treatment  sit-stand transfer;stand-sit transfer  -KB      Maintains Weight-bearing Status (Transfers)  able to maintain  -KB      Recorded by [KB] Sully Jauregui, PTA 12/12/18 1417      Row Name 12/12/18 1414             Sit-Stand Transfer    Sit-Stand [place] (Transfers)  minimum assist (75% patient effort);nonverbal cues (demo/gesture);verbal cues  -KB      Assistive Device (Sit-Stand Transfers)  walker, front-wheeled  -KB      Recorded by [KB] Sully Jauregui, PTA 12/12/18 1417      Row Name 12/12/18 1414             Stand-Sit Transfer    Stand-Sit [place] (Transfers)  minimum assist (75% patient effort);nonverbal cues (demo/gesture);verbal cues  -KB      Assistive Device (Stand-Sit Transfers)  walker, front-wheeled  -KB      Recorded by [KB] Sully Jauregui, PTA 12/12/18 1417      Row Name 12/12/18 1414             Gait/Stairs  Assessment/Training    25663 - Gait Training Minutes   15  -KB      Gait/Stairs Assessment/Training  gait/ambulation independence;gait/ambulation assistive device;distance ambulated;gait pattern;gait deviations;maintains weight-bearing status  -KB      [place] Level (Gait)  minimum assist (75% patient effort);nonverbal cues (demo/gesture);verbal cues  -KB      Assistive Device (Gait)  walker, front-wheeled  -KB      Distance in Feet (Gait)  250  -KB      Pattern (Gait)  step-through  -KB      Deviations/Abnormal Patterns (Gait)  bobby decreased;gait speed decreased  -KB      Recorded by [KB] Sully Jauregui, PTA 12/12/18 1417      Row Name 12/12/18 1436             Motor Skills Assessment/Interventions    Additional Documentation  Therapeutic Exercise (Group)  -      Recorded by [KH] Tabatha Myles, OT 12/12/18 1439      Row Name 12/12/18 1436 12/12/18 1414          Therapeutic Exercise    Therapeutic Exercise  seated, upper extremities  -  seated, lower extremities  -KB     Additional Documentation  --  Therapeutic Exercise (Row)  -     70612 - PT Therapeutic Exercise Minutes  --  10  -KB     Recorded by [KH] Tabatha Myles, OT 12/12/18 1439 [KB] Sully Jauregui, PTA 12/12/18 1417     Row Name 12/12/18 1436             Upper Extremity Seated Therapeutic Exercise    Performed, Seated Upper Extremity (Therapeutic Exercise)  shoulder flexion/extension;scapular protraction/retraction;elbow flexion/extension;wrist flexion/extension;digit flexion/extension  -      Device, Seated Upper Extremity (Therapeutic Exercise)  elastic bands/tubing weighted dowel  -      Exercise Type, Seated Upper Extremity (Therapeutic Exercise)  AROM (active range of motion) BUE GMC, BUE TherEx/Act  -      Expected Outcomes, Seated Upper Extremity (Therapeutic Exercise)  improve functional tolerance, self-care activity;improve performance, BADLs  -      Sets/Reps Detail, Seated Upper Extremity  (Therapeutic Exercise)  5 BUE strengthening/endurance exercises x 20 reps each  -KH      Recorded by [KH] Tabatha Myles, OT 12/12/18 1439      Row Name 12/12/18 1414             Lower Extremity Seated Therapeutic Exercise    Performed, Seated Lower Extremity (Therapeutic Exercise)  LAQ (long arc quad), knee extension;ankle dorsiflexion/plantarflexion;hip abduction/adduction;hip flexion/extension  -KB      Device, Seated Lower Extremity (Therapeutic Exercise)  other (see comments) pillow  -KB      Exercise Type, Seated Lower Extremity (Therapeutic Exercise)  AROM (active range of motion)  -KB      Expected Outcomes, Seated Lower Extremity (Therapeutic Exercise)  improve functional stability  -KB      Sets/Reps Detail, Seated Lower Extremity (Therapeutic Exercise)  1x10  -KB      Transfers Skills, Training to Functional Activity, Seated Lower Extremity (Therapeutic Exercise)  transfers skills to functional activity most of the time  -KB      Recorded by [KB] Sully Jauregui, PTA 12/12/18 1417      Row Name 12/12/18 1436 12/12/18 1414          Positioning and Restraints    Pre-Treatment Position  sitting in chair/recliner  -KH  sitting in chair/recliner  -KB     Post Treatment Position  chair  -KH  chair  -KB     In Chair  sitting;call light within reach;encouraged to call for assist;with family/caregiver  -KH  sitting;call light within reach;encouraged to call for assist;with family/caregiver  -KB     Recorded by [KH] Tabatha Myles, OT 12/12/18 1439 [KB] Sully Jauregui, PTA 12/12/18 1417     Row Name 12/12/18 1414             Sensory Assessment/Intervention    Sensory General Assessment  no sensation deficits identified  -KB      Recorded by [KB] Sully Jauregui, PTA 12/12/18 1417      Row Name 12/12/18 1414             Vision Assessment/Intervention    Visual Impairment/Limitations  WFL  -KB      Recorded by [KB] Sully Jauregui, PTA 12/12/18 1417      Row Name 12/12/18 1414              Coping    Observed Emotional State  accepting;calm;cooperative;flat  -KB      Verbalized Emotional State  acceptance  -KB      Recorded by [KB] Sully Jauregui, PTA 12/12/18 1417      Row Name 12/12/18 1436             Plan of Care Review    Plan of Care Reviewed With  patient  -KH      Recorded by [KH] Tabatha Myles, OT 12/12/18 1439      Row Name 12/12/18 1414             Outcome Summary/Treatment Plan (PT)    Daily Summary of Progress (PT)  progress toward functional goals as expected  -KB      Plan for Continued Treatment (PT)  Cont. per POC.  -KB      Anticipated Equipment Needs at Discharge (PT)  -- TBD  -GONZALES      Anticipated Discharge Disposition (PT)  home with assist;home with home health  -KB      Recorded by [GONZALES] Sully Jauregui, PTA 12/12/18 1417        User Key  (r) = Recorded By, (t) = Taken By, (c) = Cosigned By    Initials Name Effective Dates Discipline    KB Sully Jauregui, PTA 12/20/17 -  PT    Tabatha Cantor, OT 04/17/18 -  OT             Occupational Therapy Education     Title: PT OT SLP Therapies (Done)     Topic: Occupational Therapy (Done)     Point: ADL training (Done)     Description: Instruct learner(s) on proper safety adaptation and remediation techniques during self care or transfers.   Instruct in proper use of assistive devices.    Learning Progress Summary           Patient Acceptance, E,TB, VU by ML at 12/8/2018  9:43 AM    Acceptance, E,TB, VU by AM at 12/7/2018  9:53 AM                   Point: Home exercise program (Done)     Description: Instruct learner(s) on appropriate technique for monitoring, assisting and/or progressing therapeutic exercises/activities.    Learning Progress Summary           Patient Acceptance, E,TB, VU by ML at 12/8/2018  9:43 AM    Acceptance, E,TB, VU by AM at 12/7/2018  9:53 AM                   Point: Precautions (Done)     Description: Instruct learner(s) on prescribed precautions during self-care and functional transfers.     Learning Progress Summary           Patient Acceptance, E,TB, VU by ML at 12/8/2018  9:43 AM    Acceptance, E,TB, VU by AM at 12/7/2018  9:53 AM                   Point: Body mechanics (Done)     Description: Instruct learner(s) on proper positioning and spine alignment during self-care, functional mobility activities and/or exercises.    Learning Progress Summary           Patient Acceptance, E,TB, VU by ML at 12/8/2018  9:43 AM    Acceptance, E,TB, VU by AM at 12/7/2018  9:53 AM                               User Key     Initials Effective Dates Name Provider Type Discipline    AM 06/11/18 -  Aletha Hargrove, RN Registered Nurse Nurse     07/25/18 -  Mable Cooper, ROHIT Registered Nurse Nurse                OT Recommendation and Plan  Outcome Summary/Treatment Plan (OT)  Anticipated Equipment Needs at Discharge (OT): (TBD)  Planned Therapy Interventions (OT Eval): activity tolerance training, adaptive equipment training, BADL retraining, functional balance retraining, occupation/activity based interventions, patient/caregiver education/training, ROM/therapeutic exercise, strengthening exercise, transfer/mobility retraining  Therapy Frequency (OT Eval): 3 times/wk(3-5 x per week)  Plan of Care Review  Plan of Care Reviewed With: patient  Plan of Care Reviewed With: patient  Outcome Summary: Pt tolerated OT treatment well this date w/ rest as needed. Completed 5 BUE strengthening/endurance exercises x 20 reps each. Skilled OT to continue current POC.   Outcome Measures     Row Name 12/12/18 1400 12/11/18 1100 12/10/18 1700       How much help from another person do you currently need...    Turning from your back to your side while in flat bed without using bedrails?  3  -KB  3  -KB  3  -AG    Moving from lying on back to sitting on the side of a flat bed without bedrails?  3  -KB  3  -KB  3  -AG    Moving to and from a bed to a chair (including a wheelchair)?  3  -KB  3  -KB  3  -AG    Standing up from a chair using  your arms (e.g., wheelchair, bedside chair)?  3  -KB  3  -KB  3  -AG    Climbing 3-5 steps with a railing?  2  -KB  2  -KB  2  -AG    To walk in hospital room?  3  -KB  3  -KB  3  -AG    AM-PAC 6 Clicks Score  17  -KB  17  -KB  17  -AG       Functional Assessment    Outcome Measure Options  AM-PAC 6 Clicks Basic Mobility (PT)  -KB  AM-PAC 6 Clicks Basic Mobility (PT)  -KB  AM-PAC 6 Clicks Basic Mobility (PT)  -AG    Row Name 12/10/18 1519             How much help from another is currently needed...    Putting on and taking off regular lower body clothing?  2  -KH      Bathing (including washing, rinsing, and drying)  2  -KH      Toileting (which includes using toilet bed pan or urinal)  2  -KH      Putting on and taking off regular upper body clothing  3  -KH      Taking care of personal grooming (such as brushing teeth)  3  -KH      Eating meals  3  -KH      Score  15  -KH         Functional Assessment    Outcome Measure Options  AM-PAC 6 Clicks Daily Activity (OT)  -KH        User Key  (r) = Recorded By, (t) = Taken By, (c) = Cosigned By    Initials Name Provider Type    Aletha Moran, PT Physical Therapist    Sully Moreno PTA Physical Therapy Assistant    Tabatha Cantor, OT Occupational Therapist           Time Calculation:   Time Calculation- OT     Row Name 12/12/18 1440 12/12/18 1414          Time Calculation- OT    Total Timed Code Minutes- OT  25 minute(s)  -KH  --        Timed Charges    83105 - Gait Training Minutes   --  15  -KB     77060 - OT Therapeutic Activity Minutes  25  -KH  --       User Key  (r) = Recorded By, (t) = Taken By, (c) = Cosigned By    Initials Name Provider Type    Sully Moreno PTA Physical Therapy Assistant    Tabatha Cantor, OT Occupational Therapist           Therapy Suggested Charges     Code   Minutes Charges    63056 (CPT®) Hc Ot Neuromusc Re Education Ea 15 Min      52895 (CPT®) Hc Ot Ther Proc Ea 15 Min      15694 (CPT®) Hc Ot  Therapeutic Act Ea 15 Min 25 2    82631 (CPT®) Hc Ot Manual Therapy Ea 15 Min      60981 (CPT®) Hc Ot Iontophoresis Ea 15 Min      30356 (CPT®) Hc Ot Elec Stim Ea-Per 15 Min      87605 (CPT®) Hc Ot Ultrasound Ea 15 Min      54000 (CPT®) Hc Ot Self Care/Mgmt/Train Ea 15 Min      Total  25 2        Therapy Charges for Today     Code Description Service Date Service Provider Modifiers Qty    89297409119 HC OT THERAPEUTIC ACT EA 15 MIN 12/11/2018 Tabatha Myles, OT GO 1    66404349797 HC OT THERAPEUTIC ACT EA 15 MIN 12/12/2018 Tabatha Myles, OT GO 2          OT G-codes  OT Professional Judgement Used?: Yes  OT Functional Scales Options: AM-PAC 6 Clicks Daily Activity (OT)  Functional Assessment Tool Used: FIM  Functional Limitation: Self care  Self Care Current Status (): At least 40 percent but less than 60 percent impaired, limited or restricted  Self Care Goal Status (): At least 20 percent but less than 40 percent impaired, limited or restricted    Tabatha Myles OT  12/12/2018

## 2018-12-12 NOTE — PLAN OF CARE
Problem: Skin Injury Risk (Adult)  Goal: Identify Related Risk Factors and Signs and Symptoms  Outcome: Ongoing (interventions implemented as appropriate)   12/07/18 0953   Skin Injury Risk (Adult)   Related Risk Factors (Skin Injury Risk) advanced age     Goal: Skin Health and Integrity  Outcome: Ongoing (interventions implemented as appropriate)   12/12/18 0141   Skin Injury Risk (Adult)   Skin Health and Integrity making progress toward outcome       Problem: Hyperglycemia, Persistent (Adult)  Goal: Signs and Symptoms of Listed Potential Problems Will be Absent, Minimized or Managed (Hyperglycemia, Persistent)  Outcome: Ongoing (interventions implemented as appropriate)   12/12/18 0141   Goal/Outcome Evaluation   Problems Assessed (Hyperglycemia) all   Problems Present (Hyperglycemia) hyperglycemia       Problem: Fall Risk (Adult)  Goal: Identify Related Risk Factors and Signs and Symptoms  Outcome: Ongoing (interventions implemented as appropriate)   12/09/18 0142 12/12/18 0141   Fall Risk (Adult)   Related Risk Factors (Fall Risk) age-related changes;confusion/agitation;fatigue/slow reaction;gait/mobility problems;sleep pattern alteration;environment unfamiliar --    Signs and Symptoms (Fall Risk) --  presence of risk factors     Goal: Absence of Fall  Outcome: Ongoing (interventions implemented as appropriate)   12/12/18 0141   Fall Risk (Adult)   Absence of Fall making progress toward outcome       Problem: Self-Care Deficit (Adult,Obstetrics,Pediatric)  Goal: Identify Related Risk Factors and Signs and Symptoms  Outcome: Ongoing (interventions implemented as appropriate)   12/12/18 0141   Self-Care Deficit (Adult,Obstetrics,Pediatric)   Related Risk Factors (Self-Care Deficit) activity intolerance;pain/discomfort   Signs and Symptoms (Self-Care Deficit) requests assistance     Goal: Improved Ability to Perform BADL and IADL  Outcome: Ongoing (interventions implemented as appropriate)   12/12/18 0141    Self-Care Deficit (Adult,Obstetrics,Pediatric)   Improved Ability to Perform BADL and IADL making progress toward outcome

## 2018-12-12 NOTE — PROGRESS NOTES
Discharge Planning Assessment  QUINCY Jorgensen     Patient Name: Todd Phillips  MRN: [id number]  Today's Date: 12/12/2018    Admit Date: 12/6/2018      Discharge Plan     Row Name 12/12/18 1449       Plan    Plan  Pt lives at home with his spouse and plans to return home at discharge. Pt utilizes [facility]. [facility] [phone] to be contacted at discharge. Pt receives home TPN from [facility]. Pt has a rolling walker and a shower chair at home. SS to follow and assist as needed with discharge planning.         Mercedes Castellanos

## 2018-12-12 NOTE — PROGRESS NOTES
"Palliative Care Progress Note    Date of Admission: 12/6/2018    Code Status:   Current Code Status     Date Active Code Status Order ID Comments User Context       12/7/2018 06:35 CPR [id number]  Marianela Zepeda APRN ED       Questions for Current Code Status     Question Answer Comment    Code Status CPR     Medical Interventions (Level of Support Prior to Arrest) Full         Advance Directive: None  Surrogate decision maker: Emmy, wife     Subjective:     Patient sitting up in chair, beginning to eat.  Wife at bedside also. Dr. Rao just left room and GOC- PLAN in place.  Patient and wife are content with plan.  He states very little nausea right now.  Phenergan and zofran scheduled seems to be helping he says.  He also states pain is much better and tolerable.     Reviewed current scheduled and prn medications for route, type, dose, and frequency.    Using no PRN pain meds.     [facility]ult [facility] TPN  Last Rate: 80 mL/hr at 12/11/18 1920   Pharmacy Consult       •  acetaminophen  •  albuterol  •  aluminum-magnesium hydroxide-simethicone  •  dextrose  •  dextrose  •  glucagon (human recombinant)  •  LORazepam  •  magnesium sulfate **OR** magnesium sulfate **OR** magnesium sulfate  •  Morphine  •  nitroglycerin  •  oxyCODONE  •  Pharmacy Consult  •  polyethylene glycol  •  potassium chloride **OR** potassium chloride **OR** potassium chloride  •  [COMPLETED] Insert peripheral IV **AND** sodium chloride  •  sodium chloride    Objective:  PPS 40 /70 (BP Location: Left arm, Patient Position: Sitting)   Pulse 82   Temp 98.1 °F (36.7 °C) (Oral)   Resp 16   Ht 182.9 cm (72\")   Wt 89.4 kg (197 lb 1.6 oz)   SpO2 97%   BMI 26.73 kg/m²    Intake & Output (last day)       12/11 0701 - 12/12 0700 12/12 0701 - 12/13 0700    P.O. 360 600    IV Piggyback 100     Total Intake(mL/kg) 460 (5.1) 600 (6.7)    Net +460 +600          Urine Unmeasured Occurrence 7 x 2 x    Stool Unmeasured Occurrence 0 x         Lab " Results (last 24 hours)     Procedure Component Value Units Date/Time    POC Glucose Once [[id number]]  (Abnormal) Collected:  12/12/18 1125    Specimen:  Blood Updated:  12/12/18 1143     Glucose 137 mg/dL     POC Glucose Once [[id number]]  (Abnormal) Collected:  12/12/18 0647    Specimen:  Blood Updated:  12/12/18 0654     Glucose 151 mg/dL     CBC & Differential [[id number]] Collected:  12/12/18 0445    Specimen:  Blood Updated:  12/12/18 0542    Narrative:       The following orders were created for panel order CBC & Differential.  Procedure                               Abnormality         Status                     ---------                               -----------         ------                     Manual Differential[[id number]]                              Final result               Scan Slide[[id number]]                                       Final result               CBC Auto Differential[[id number]]        Abnormal            Final result                 Please view results for these tests on the individual orders.    CBC Auto Differential [[id number]]  (Abnormal) Collected:  12/12/18 0445    Specimen:  Blood Updated:  12/12/18 0542     WBC 7.35 10*3/mm3      RBC 3.72 10*6/mm3      Hemoglobin 11.0 g/dL      Hematocrit 34.4 %      MCV 92.5 fL      MCH 29.6 pg      MCHC 32.0 g/dL      RDW 16.4 %      RDW-SD 53.1 fl      MPV 10.1 fL      Platelets 189 10*3/mm3     Scan Slide [[id number]] Collected:  12/12/18 0445    Specimen:  Blood Updated:  12/12/18 0542     Scan Slide --     Comment: See Manual Differential Results       Manual Differential [[id number]] Collected:  12/12/18 0445    Specimen:  Blood Updated:  12/12/18 0542     Neutrophil % 59.0 %      Lymphocyte % 26.0 %      Monocyte % 7.0 %      Eosinophil % 5.0 %      Bands %  3.0 %      Neutrophils Absolute 4.56 10*3/mm3      Lymphocytes Absolute 1.91 10*3/mm3      Monocytes Absolute 0.51 10*3/mm3      Eosinophils Absolute 0.37 10*3/mm3      Anisocytosis  Slight/1+     Hypochromia Slight/1+     Platelet Morphology Normal    C-reactive Protein [[id number]]  (Normal) Collected:  12/12/18 0445    Specimen:  Blood Updated:  12/12/18 0537     C-Reactive Protein <0.50 mg/dL     Comprehensive Metabolic Panel [[id number]]  (Abnormal) Collected:  12/12/18 0445    Specimen:  Blood Updated:  12/12/18 0535     Glucose 137 mg/dL      BUN 25 mg/dL      Creatinine 1.16 mg/dL      Sodium 133 mmol/L      Potassium 4.1 mmol/L      Chloride 104 mmol/L      CO2 23.5 mmol/L      Calcium 9.1 mg/dL      Total Protein 6.3 g/dL      Albumin 3.50 g/dL      ALT (SGPT) 15 U/L      AST (SGOT) 21 U/L      Alkaline Phosphatase 85 U/L      Comment: Note New Reference Ranges        Total Bilirubin 0.4 mg/dL      eGFR Non African Amer 62 mL/min/1.73      Globulin 2.8 gm/dL      A/G Ratio 1.3 g/dL      BUN/Creatinine Ratio 21.6     Anion Gap 5.5 mmol/L     Osmolality, Calculated [[id number]]  (Abnormal) Collected:  12/12/18 0445    Specimen:  Blood Updated:  12/12/18 0535     Osmolality Calc 272.9 mOsm/kg     Magnesium [[id number]]  (Normal) Collected:  12/12/18 0445    Specimen:  Blood Updated:  12/12/18 0535     Magnesium 2.1 mg/dL     Blood Culture - Blood, Arm, Right [[id number]] Collected:  12/06/18 2312    Specimen:  Blood from Arm, Right Updated:  12/11/18 2346     Blood Culture No growth at 5 days    Blood Culture - Blood, Arm, Left [[id number]] Collected:  12/06/18 2305    Specimen:  Blood from Arm, Left Updated:  12/11/18 2346     Blood Culture No growth at 5 days    POC Glucose Once [[id number]]  (Abnormal) Collected:  12/11/18 2218    Specimen:  Blood Updated:  12/11/18 2229     Glucose 161 mg/dL     POC Glucose Once [[id number]]  (Abnormal) Collected:  12/11/18 1631    Specimen:  Blood Updated:  12/11/18 1646     Glucose 144 mg/dL         Imaging Results (last 24 hours)     ** No results found for the last 24 hours. **          Physical Exam:  Gen: NAD, sitting up in chair   Skin: warm,  "dry  Eyes: FREDDIE, conjunctiva clear and moist  HEENT: oral cavity moist, no lesions or thrush noted   Resp/thorax: even effort, non labored, CTA  CV: RRR   ABD: soft, slightly tender (did not palpate hard), bowel sounds normal   Neuro: alert, interactive, no myoclonus   Psych: appropriate conversation and mood    Reviewed labs and diagnostic results.   Lab Results   Component Value Date    HGBA1C 7.00 (H) 12/08/2018     Results from last 7 days   Lab Units  12/12/18   0445   WBC 10*3/mm3  7.35   HEMOGLOBIN g/dL  11.0*   HEMATOCRIT %  34.4*   PLATELETS 10*3/mm3  189     Results from last 7 days   Lab Units  12/12/18   0445   SODIUM mmol/L  133*   POTASSIUM mmol/L  4.1   CHLORIDE mmol/L  104   CO2 mmol/L  23.5*   BUN mg/dL  25*   CREATININE mg/dL  1.16   CALCIUM mg/dL  9.1   BILIRUBIN mg/dL  0.4   ALK PHOS U/L  85   ALT (SGPT) U/L  15   AST (SGOT) U/L  21   GLUCOSE mg/dL  137*       Impression: 68 y.o. male with pancreatic cancer, recurrent sepsis from GI source    Plan:     1.  Goals of Care   - Plan is to discharge (hopefully) tomorrow, and go home with HH and PT and get some strength built up.  Then have WHIPPLE surgery the first of January.  Dr. Thomson did mention to family yesterday to have back up plans \"just in case\"     2.  Abd Pain   - Controlled on scheduled morphine ER.  Pt have oxycodone IR for BTP and is not using it at all.  He states pain is much much better today and he is tolerating it well.  He would rate his pain a 4/10- pain generally is around the umbilicus and the left side.  He denies any radiation to his back.  I have encouraged to use PRN pain meds if he has BTP.      3.  Nausea/vomiting    - Improved with scheduled zofran and phenergan.  I have discussed with him and wife about taking both at home on a set schedule like they have here.  Both verbalize understanding.  And as Dr. Thomson suggested, if nausea continues and becomes less controlled, haldol/compazine/reglan are good alternatives.  "     We appreciate the consult.  Patient symptoms seem to be controlled.  The GOC are established and a plan is in place by hospitalist and Dr. Alves.  We will sign off at this time, and if we can be of assistance again with Mr. Phillips, please do not hesitate to re-consult.     Time: 30 minutes   > 50% time spent in counseling and education concerning current orders for symptom management with nurse, wife, patient, palliative team.     Angelita Ignacio, APRN  [phone]  12/12/18  1:20 PM

## 2018-12-12 NOTE — PLAN OF CARE
Problem: Patient Care Overview  Goal: Plan of Care Review  Outcome: Ongoing (interventions implemented as appropriate)   12/12/18 1412   Coping/Psychosocial   Plan of Care Reviewed With patient   Coping/Psychosocial   Patient Agreement with Plan of Care agrees   Plan of Care Review   Progress improving   OTHER   Outcome Summary Pt. performed all therapeutic activities as prescribed and required adequate rest breaks. Cont. per POC.

## 2018-12-12 NOTE — PROGRESS NOTES
PROGRESS NOTE         Patient Identification:  Name:  Todd Phillips  Age:  68 y.o.  Sex:  male  :  1950  MRN:  [id number]  Visit Number:  [id number]  Primary Care Provider:  Adiel Denney MD      ----------------------------------------------------------------------------------------------------------------------  Subjective       Chief Complaints:    Chest Pain and Fever        Interval History:      Patient doing well this morning. Resolving nausea and abdominal pain. Eating breakfast during rounds this morning. WBC normal. CRP normal.     Review of Systems:    Constitutional: no fever, chills and night sweats. No appetite change or unexpected weight change. No fatigue.  Eyes: no eye drainage, itching or redness.  HEENT: no mouth sores, dysphagia or nose bleed.  Respiratory: no for shortness of breath, cough or production of sputum.  Cardiovascular: no chest pain, no palpitations, no orthopnea.  Gastrointestinal: No nausea, no vomiting or diarrhea. No abdominal pain, no hematemesis or rectal bleeding.  Genitourinary: no dysuria or polyuria.  Hematologic/lymphatic: no lymph node abnormalities, no easy bruising or easy bleeding.  Musculoskeletal: no muscle or joint pain.  Skin: No rash and no itching.  Neurological: no loss of consciousness, no seizure, no headache.  Behavioral/Psych: no depression or suicidal ideation.  Endocrine: no hot flashes.  Immunologic: negative.    ----------------------------------------------------------------------------------------------------------------------      Objective       Current Hospital Meds:    allopurinol 100 mg Oral Daily   apixaban 5 mg Oral Q12H   cefepime 2 g Intravenous Q8H   cholecalciferol 1,000 Units Oral Daily   colchicine 0.6 mg Oral Daily   dronabinol 5 mg Oral BID AC   fat emulsion 250 mL Intravenous Once per day on    And      trace minerals + multivitamin IVPB  Intravenous Once per day on    flecainide 50 mg  Oral Q12H   FLUoxetine 20 mg Oral BID   insulin aspart 0-7 Units Subcutaneous Q6H   linaclotide 145 mcg Oral QAM AC   micafungin (MYCAMINE)  mg Intravenous Q24H   mometasone 2 puff Inhalation Nightly   montelukast 10 mg Oral Daily   Morphine 30 mg Oral Q12H   ondansetron 8 mg Intravenous Q8H   pantoprazole 40 mg Oral BID AC   promethazine 12.5 mg Intravenous 4x Daily   sodium chloride 3 mL Intravenous Q12H   tiotropium bromide-olodaterol 2 puff Inhalation Daily   vancomycin 1,500 mg Intravenous Q24H       [facility]ult [facility] TPN  Last Rate: 80 mL/hr at 12/11/18 1920   Pharmacy Consult       ----------------------------------------------------------------------------------------------------------------------    Vital Signs:  Temp:  [97.4 °F (36.3 °C)-98.4 °F (36.9 °C)] 97.4 °F (36.3 °C)  Heart Rate:  [71-86] 77  Resp:  [16-20] 16  BP: (112-139)/(75-89) 118/77  No data found.  SpO2 Percentage    12/11/18 1857 12/12/18 0631 12/12/18 0709   SpO2: 94% 96% 97%     SpO2:  [92 %-97 %] 97 %  on   ;   Device (Oxygen Therapy): room air    Body mass index is 26.73 kg/m².  Wt Readings from Last 3 Encounters:   12/12/18 89.4 kg (197 lb 1.6 oz)   12/04/18 88.4 kg (194 lb 12.8 oz)   11/27/18 90.7 kg (200 lb)        Intake/Output Summary (Last 24 hours) at 12/12/2018 1006  Last data filed at 12/12/2018 0900  Gross per 24 hour   Intake 820 ml   Output --   Net 820 ml     [facility]ult [facility] TPN  Diet Regular; Consistent Carbohydrate  ----------------------------------------------------------------------------------------------------------------------    Physical exam:      Constitutional:  Well-developed and well-nourished.  No respiratory distress.      HENT:  Head: Normocephalic and atraumatic.  Mouth:  Moist mucous membranes.    Eyes:  Conjunctivae and EOM are normal.  No scleral icterus.  Neck:  Neck supple.  No JVD present.    Cardiovascular:  Normal rate, regular rhythm and normal heart sounds with no murmur. No  edema.  Pulmonary/Chest:  No respiratory distress, no wheezes, no crackles, with normal breath sounds and good air movement.  Abdominal:  Soft.  Bowel sounds are normal.  No distension.No tenderness.  Musculoskeletal:  No edema, no tenderness, and no deformity.  No swelling or redness of joints.  Neurological:  Alert and oriented to person, place, and time.  No facial droop.  No slurred speech.   Skin:  Skin is warm and dry.  No rash noted.  No pallor.   Psychiatric:  Normal mood and affect.  Behavior is normal.    ----------------------------------------------------------------------------------------------------------------------  I have personally reviewed the EKG/Telemetry strips   ----------------------------------------------------------------------------------------------------------------------  Results from last 7 days   Lab Units  12/06/18   2305   TROPONIN I ng/mL  0.007       Results from last 7 days   Lab Units  12/07/18   0705   TRIGLYCERIDES mg/dL  127     Results from last 7 days   Lab Units  12/06/18   2319   PH, ARTERIAL pH units  7.530*   PO2 ART mm Hg  74.8*   PCO2, ARTERIAL mm Hg  29.7*   HCO3 ART mmol/L  24.3     Results from last 7 days   Lab Units  12/12/18   0445  12/11/18   0435  12/10/18   0453   12/08/18   0816   12/07/18   1141  12/07/18   0705  12/06/18   2305   CRP mg/dL  <0.50  0.53  0.67   < >   --    --    --   3.15*  2.00*   LACTATE mmol/L   --    --    --    --   2.0   --   2.1*  2.5*  1.3   WBC 10*3/mm3  7.35  7.97  8.18   < >   --    < >   --   10.68  10.72   HEMOGLOBIN g/dL  11.0*  10.7*  10.5*   < >   --    < >   --   11.3*  11.3*   HEMATOCRIT %  34.4*  32.8*  33.4*   < >   --    < >   --   34.6*  33.9*   MCV fL  92.5  90.1  92.5   < >   --    < >   --   91.3  89.4   MCHC g/dL  32.0*  32.6*  31.4*   < >   --    < >   --   32.7*  33.3   PLATELETS 10*3/mm3  189  197  217   < >   --    < >   --   282  323   INR    --    --    --    --    --    --    --    --   1.56*    < > =  values in this interval not displayed.     Results from last 7 days   Lab Units  12/12/18   0445  12/11/18   0435  12/10/18   0453  12/09/18   0412   SODIUM mmol/L  133*  134*  135  135   POTASSIUM mmol/L  4.1  4.0  4.4  4.8   MAGNESIUM mg/dL  2.1  1.8   --   2.0   CHLORIDE mmol/L  104  101  101  103   CO2 mmol/L  23.5*  27.6  27.0  23.3*   BUN mg/dL  25*  28*  32*  33*   CREATININE mg/dL  1.16  1.27  1.22  1.25   EGFR IF NONAFRICN AM mL/min/1.73  62  56*  59*  57*   CALCIUM mg/dL  9.1  9.2  8.7  8.8   GLUCOSE mg/dL  137*  143*  134*  127*   ALBUMIN g/dL  3.50  3.60   --   3.20*   BILIRUBIN mg/dL  0.4  0.2   --   0.2   ALK PHOS U/L  85  83   --   77   AST (SGOT) U/L  21  18   --   22   ALT (SGPT) U/L  15  13   --   10   Estimated Creatinine Clearance: 74.9 mL/min (by C-G formula based on SCr of 1.16 mg/dL).  No results found for: AMMONIA    Glucose   Date/Time Value Ref Range Status   12/12/2018 0647 151 (H) 70 - 130 mg/dL Final   12/11/2018 2218 161 (H) 70 - 130 mg/dL Final   12/11/2018 1631 144 (H) 70 - 130 mg/dL Final   12/11/2018 1056 159 (H) 70 - 130 mg/dL Final   12/11/2018 0528 141 (H) 70 - 130 mg/dL Final   12/10/2018 2306 145 (H) 70 - 130 mg/dL Final   12/10/2018 1639 141 (H) 70 - 130 mg/dL Final   12/10/2018 1057 156 (H) 70 - 130 mg/dL Final     Lab Results   Component Value Date    HGBA1C 7.00 (H) 12/08/2018     Lab Results   Component Value Date    TSH 1.502 11/18/2018    FREET4 1.14 10/30/2018       Blood Culture   Date Value Ref Range Status   12/06/2018 No growth at 3 days  Preliminary   12/06/2018 No growth at 3 days  Preliminary     Urine Culture   Date Value Ref Range Status   12/06/2018 No growth  Final              Pain Management Panel     Pain Management Panel Latest Ref Rng & Units 10/16/2018 9/12/2018    CREATININE UR mg/dL 208.8 -    AMPHETAMINES SCREEN, URINE Negative - Negative    BARBITURATES SCREEN Negative - Negative    BENZODIAZEPINE SCREEN, URINE Negative - Negative    BUPRENORPHINE  Negative - Negative    COCAINE SCREEN, URINE Negative - Negative    METHADONE SCREEN, URINE Negative - Negative          I have personally reviewed the above laboratory results.   ----------------------------------------------------------------------------------------------------------------------  Imaging Results (last 24 hours)     ** No results found for the last 24 hours. **        I have personally reviewed the above radiology results.   ----------------------------------------------------------------------------------------------------------------------    Assessment/Plan       Assessment/Plan     ASSESSMENT:    1. Severe sepsis with lactic acid greater than 2 on admission  2. Fever of unknown origin  3. Immunosupression    PLAN:    Patient doing well this morning. Resolving nausea and abdominal pain. Eating breakfast during rounds this morning. WBC normal. CRP normal.     Blood cultures from 12/6/18 show no growth at this time.    Mycoplasma negative. Legionella negative. Influenza negative. Urine culture from 12/6/18 finalized as no growth. Blood cultures from 12/6/18 show no growth at this time. Lactic acid 2.0, improved from 2.5 on admission.     Chest x-ray from 12/9/18 reveals right infrahilar consolidation. KUB from 12/7/18 unremarkable.      Patient had a prior episode of bacteremia and is at very high risk for relapsing bacteremia.     In the setting of risk of relapsing bacteremia vancomycin pharmacy to dose, Cefepime 2gm IV Q12H , and Micafungin 100 mg IV q 24 hrs was initiated.     At any time patient can be discharged. We recommend to continue current regimen until discharge with no antibiotics needed up on discharge due to patient's immunosuppression. CRP in AM.     Current Antimicrobials:  Vancomycin pharmacy to dose  Cefepime 2gm IV Q12H  Micafungin 100mg IV Q24H        Code Status:   Code Status and Medical Interventions:   Ordered at: 12/07/18 0635     Code Status:    [facility]     Medical  Interventions (Level of Support Prior to Arrest):    Full       VIMAL Wakefield  12/12/18  10:06 AM

## 2018-12-12 NOTE — PLAN OF CARE
Problem: Patient Care Overview  Goal: Plan of Care Review  Outcome: Ongoing (interventions implemented as appropriate)   12/12/18 3347   Coping/Psychosocial   Plan of Care Reviewed With patient   Plan of Care Review   Progress improving   OTHER   Outcome Summary Pt tolerated OT treatment well this date w/ rest as needed. Completed 5 BUE strengthening/endurance exercises x 20 reps each. Skilled OT to continue current POC.

## 2018-12-12 NOTE — THERAPY TREATMENT NOTE
Acute Care - Physical Therapy Treatment Note   Jameel     Patient Name: Todd Phillips  : 1950  MRN: [id number]  Today's Date: 2018  Onset of Illness/Injury or Date of Surgery: 18  Date of Referral to PT: 18  Referring Physician: Dr. Valdez    Admit Date: 2018    Visit Dx:    ICD-10-CM ICD-9-CM   1. Fever, unspecified fever cause R50.9 780.60     Patient Active Problem List   Diagnosis   • Hyperlipidemia   • COPD (chronic obstructive pulmonary disease) (CMS/HCC)   • Essential hypertension   • Gout without tophus   • Anxiety and depression   • Primary insomnia   • Gastroesophageal reflux disease without esophagitis   • Nonobstructive atherosclerosis of coronary artery   • CKD stage 3 secondary to diabetes (CMS/HCC)   • DM2 (diabetes mellitus, type 2) (CMS/HCC)   • Paroxysmal atrial fibrillation   • Chronic anticoagulation, eliquis   • Encounter for monitoring flecainide therapy   • Malignant neoplasm of head of pancreas (CMS/HCC)   • Bacteremia due to Escherichia coli   • Biliary obstruction   • Thrombocytopenia (CMS/HCC)   • Anemia due to chemotherapy   • Fever       Therapy Treatment    Rehabilitation Treatment Summary     Row Name 18 1414             Treatment Time/Intention    Discipline  physical therapy assistant  -KB      Document Type  therapy note (daily note)  -KB      Subjective Information  no complaints  -KB      Mode of Treatment  individual therapy;physical therapy  -KB      Patient/Family Observations  Pt. sitting in chair in no apparent signs of distress; wife present. Both agreeable to PT tx on this date.  -KB      Therapy Frequency (PT Clinical Impression)  other (see comments) 3-5 times/ week per priority policy  -KB      Patient Effort  good  -KB      Existing Precautions/Restrictions  fall  -KB      Patient Response to Treatment  Pt. tolerated tx well with no adverse effects noted; gait distance increased/  -KB      Recorded by [KB] Sully Jauregui, PTA  12/12/18 1417      Row Name 12/12/18 1414             Cognitive Assessment/Intervention- PT/OT    Orientation Status (Cognition)  oriented x 3  -KB      Follows Commands (Cognition)  follows one step commands;WFL  -KB      Recorded by [KB] Sully Jauregui, PTA 12/12/18 1417      Row Name 12/12/18 1414             Safety Issues, Functional Mobility    Safety Issues Affecting Function (Mobility)  awareness of need for assistance;safety precaution awareness;safety precautions follow-through/compliance  -KB      Impairments Affecting Function (Mobility)  balance;coordination;endurance/activity tolerance;pain;strength  -KB      Recorded by [KB] Sully Jauregui, PTA 12/12/18 1417      Row Name 12/12/18 1414             Mobility Assessment/Intervention    Extremity Weight-bearing Status  left lower extremity;right lower extremity  -KB      Left Lower Extremity (Weight-bearing Status)  weight-bearing as tolerated (WBAT)  -KB      Right Lower Extremity (Weight-bearing Status)  weight-bearing as tolerated (WBAT)  -KB      Recorded by [KB] Sully Jauregui, PTA 12/12/18 1417      Row Name 12/12/18 1414             Bed Mobility Assessment/Treatment    Comment (Bed Mobility)  Not observed on this date.  -KB      Recorded by [KB] Sully Jauregui, PTA 12/12/18 1417      Row Name 12/12/18 1414             Transfer Assessment/Treatment    Transfer Assessment/Treatment  sit-stand transfer;stand-sit transfer  -KB      Maintains Weight-bearing Status (Transfers)  able to maintain  -KB      Recorded by [KB] Sully Jauregui, PTA 12/12/18 1417      Row Name 12/12/18 1414             Sit-Stand Transfer    Sit-Stand [place] (Transfers)  minimum assist (75% patient effort);nonverbal cues (demo/gesture);verbal cues  -KB      Assistive Device (Sit-Stand Transfers)  walker, front-wheeled  -KB      Recorded by [KB] Sully Jauregui, PTA 12/12/18 1417      Row Name 12/12/18 1414             Stand-Sit Transfer    Stand-Sit [place]  (Transfers)  minimum assist (75% patient effort);nonverbal cues (demo/gesture);verbal cues  -KB      Assistive Device (Stand-Sit Transfers)  walker, front-wheeled  -KB      Recorded by [KB] Sully Jauregui, PTA 12/12/18 1417      Row Name 12/12/18 1414             Gait/Stairs Assessment/Training    01038 - Gait Training Minutes   15  -KB      Gait/Stairs Assessment/Training  gait/ambulation independence;gait/ambulation assistive device;distance ambulated;gait pattern;gait deviations;maintains weight-bearing status  -KB      [place] Level (Gait)  minimum assist (75% patient effort);nonverbal cues (demo/gesture);verbal cues  -KB      Assistive Device (Gait)  walker, front-wheeled  -KB      Distance in Feet (Gait)  250  -KB      Pattern (Gait)  step-through  -KB      Deviations/Abnormal Patterns (Gait)  bobby decreased;gait speed decreased  -KB      Recorded by [KB] Sully Jauregui, PTA 12/12/18 1417      Row Name 12/12/18 1414             Therapeutic Exercise    Therapeutic Exercise  seated, lower extremities  -KB      Additional Documentation  Therapeutic Exercise (Row)  -KB      68783 - PT Therapeutic Exercise Minutes  10  -KB      Recorded by [KB] Sully Jauregui, PTA 12/12/18 1417      Row Name 12/12/18 1414             Lower Extremity Seated Therapeutic Exercise    Performed, Seated Lower Extremity (Therapeutic Exercise)  LAQ (long arc quad), knee extension;ankle dorsiflexion/plantarflexion;hip abduction/adduction;hip flexion/extension  -KB      Device, Seated Lower Extremity (Therapeutic Exercise)  other (see comments) pillow  -KB      Exercise Type, Seated Lower Extremity (Therapeutic Exercise)  AROM (active range of motion)  -KB      Expected Outcomes, Seated Lower Extremity (Therapeutic Exercise)  improve functional stability  -KB      Sets/Reps Detail, Seated Lower Extremity (Therapeutic Exercise)  1x10  -KB      Transfers Skills, Training to Functional Activity, Seated Lower Extremity (Therapeutic  Exercise)  transfers skills to functional activity most of the time  -KB      Recorded by [KB] Sully Jauregui, PTA 12/12/18 1417      Row Name 12/12/18 1414             Positioning and Restraints    Pre-Treatment Position  sitting in chair/recliner  -KB      Post Treatment Position  chair  -KB      In Chair  sitting;call light within reach;encouraged to call for assist;with family/caregiver  -KB      Recorded by [KB] Sully Jauregui, PTA 12/12/18 1417      Row Name 12/12/18 1414             Sensory Assessment/Intervention    Sensory General Assessment  no sensation deficits identified  -KB      Recorded by [KB] Sully Jauregui, PTA 12/12/18 1417      Row Name 12/12/18 1414             Vision Assessment/Intervention    Visual Impairment/Limitations  WFL  -KB      Recorded by [KB] Sully Jauregui, PTA 12/12/18 1417      Row Name 12/12/18 1414             Coping    Observed Emotional State  accepting;calm;cooperative;flat  -KB      Verbalized Emotional State  acceptance  -KB      Recorded by [KB] Sully Jauregui, PTA 12/12/18 1417      Row Name 12/12/18 1414             Outcome Summary/Treatment Plan (PT)    Daily Summary of Progress (PT)  progress toward functional goals as expected  -KB      Plan for Continued Treatment (PT)  Cont. per POC.  -KB      Anticipated Equipment Needs at Discharge (PT)  -- TBD  -KB      Anticipated Discharge Disposition (PT)  home with assist;home with home health  -KB      Recorded by [KB] Sully Jauregui, PTA 12/12/18 1417        User Key  (r) = Recorded By, (t) = Taken By, (c) = Cosigned By    Initials Name Effective Dates Discipline    Sully Moreno, [facility] 12/20/17 -  PT                   Physical Therapy Education     Title: PT OT SLP Therapies (Done)     Topic: Physical Therapy (Done)     Point: Mobility training (Done)     Learning Progress Summary           Patient Acceptance, E,TB, VU by GONZALES at 12/12/2018  2:12 PM    Acceptance, E,TB, VU,NR by GONZALES at 12/11/2018 11:22 AM     Acceptance, E,D, VU,NR by AG at 12/10/2018  5:52 PM    Acceptance, E,TB, VU by ML at 12/8/2018  9:43 AM    Acceptance, E,TB, VU by AM at 12/7/2018  9:53 AM   Family Acceptance, E,TB, VU by KB at 12/12/2018  2:12 PM    Acceptance, E,D, VU,NR by AG at 12/10/2018  5:52 PM                   Point: Home exercise program (Done)     Learning Progress Summary           Patient Acceptance, E,TB, VU by KB at 12/12/2018  2:12 PM    Acceptance, E,TB, VU,NR by KB at 12/11/2018 11:22 AM    Acceptance, E,D, VU,NR by AG at 12/10/2018  5:52 PM    Acceptance, E,TB, VU by ML at 12/8/2018  9:43 AM    Acceptance, E,TB, VU by AM at 12/7/2018  9:53 AM   Family Acceptance, E,TB, VU by KB at 12/12/2018  2:12 PM    Acceptance, E,D, VU,NR by AG at 12/10/2018  5:52 PM                   Point: Body mechanics (Done)     Learning Progress Summary           Patient Acceptance, E,TB, VU by KB at 12/12/2018  2:12 PM    Acceptance, E,TB, VU,NR by KB at 12/11/2018 11:22 AM    Acceptance, E,D, VU,NR by AG at 12/10/2018  5:52 PM    Acceptance, E,TB, VU by ML at 12/8/2018  9:43 AM    Acceptance, E,TB, VU by AM at 12/7/2018  9:53 AM   Family Acceptance, E,TB, VU by KB at 12/12/2018  2:12 PM    Acceptance, E,D, VU,NR by AG at 12/10/2018  5:52 PM                   Point: Precautions (Done)     Learning Progress Summary           Patient Acceptance, E,TB, VU by KB at 12/12/2018  2:12 PM    Acceptance, E,TB, VU,NR by KB at 12/11/2018 11:22 AM    Acceptance, E,D, VU,NR by AG at 12/10/2018  5:52 PM    Acceptance, E,TB, VU by ML at 12/8/2018  9:43 AM    Acceptance, E,TB, VU by AM at 12/7/2018  9:53 AM   Family Acceptance, E,TB, VU by KB at 12/12/2018  2:12 PM    Acceptance, E,D, VU,NR by BUFFY at 12/10/2018  5:52 PM                               User Key     Initials Effective Dates Name Provider Type Discipline     04/03/18 -  Aletha Metcalf, PT Physical Therapist PT     12/20/17 -  Sully Jauregui PTA Physical Therapy Assistant PT    AM 06/11/18 -  Delvin  Aletha PICKARD, RN Registered Nurse Nurse     07/25/18 -  Mable Cooper RN Registered Nurse Nurse                PT Recommendation and Plan  Anticipated Discharge Disposition (PT): home with assist, home with home health  Therapy Frequency (PT Clinical Impression): other (see comments)(3-5 times/ week per priority policy)  Outcome Summary/Treatment Plan (PT)  Daily Summary of Progress (PT): progress toward functional goals as expected  Plan for Continued Treatment (PT): Cont. per POC.  Anticipated Equipment Needs at Discharge (PT): (TBD)  Anticipated Discharge Disposition (PT): home with assist, home with home health  Plan of Care Reviewed With: patient  Progress: improving  Outcome Summary: Pt. performed all therapeutic activities as prescribed and required adequate rest breaks. Cont. per POC.  Outcome Measures     Row Name 12/12/18 1400 12/11/18 1100 12/10/18 1700       How much help from another person do you currently need...    Turning from your back to your side while in flat bed without using bedrails?  3  -KB  3  -KB  3  -AG    Moving from lying on back to sitting on the side of a flat bed without bedrails?  3  -KB  3  -KB  3  -AG    Moving to and from a bed to a chair (including a wheelchair)?  3  -KB  3  -KB  3  -AG    Standing up from a chair using your arms (e.g., wheelchair, bedside chair)?  3  -KB  3  -KB  3  -AG    Climbing 3-5 steps with a railing?  2  -KB  2  -KB  2  -AG    To walk in hospital room?  3  -KB  3  -KB  3  -AG    AM-PAC 6 Clicks Score  17  -KB  17  -KB  17  -AG       Functional Assessment    Outcome Measure Options  AM-PAC 6 Clicks Basic Mobility (PT)  -KB  AM-PAC 6 Clicks Basic Mobility (PT)  -KB  AM-PAC 6 Clicks Basic Mobility (PT)  -AG    Row Name 12/10/18 1519             How much help from another is currently needed...    Putting on and taking off regular lower body clothing?  2  -KH      Bathing (including washing, rinsing, and drying)  2  -KH      Toileting (which includes using toilet  bed pan or urinal)  2  -KH      Putting on and taking off regular upper body clothing  3  -KH      Taking care of personal grooming (such as brushing teeth)  3  -KH      Eating meals  3  -KH      Score  15  -KH         Functional Assessment    Outcome Measure Options  AM-PAC 6 Clicks Daily Activity (OT)  -KH        User Key  (r) = Recorded By, (t) = Taken By, (c) = Cosigned By    Initials Name Provider Type    Aletha Moran, PT Physical Therapist    Sully Moreno, [place] Physical Therapy Assistant    Tabatha Cantor, OT Occupational Therapist         Time Calculation:   PT Charges     Row Name 12/12/18 1414 12/12/18 1411          Time Calculation    PT Received On  --  12/12/18  -KB     PT - Next Appointment  --  12/13/18  -KB     PT Goal Re-Cert Due Date  --  12/24/18  -KB        Time Calculation- PT    Total Timed Code Minutes- PT  --  25 minute(s)  -KB        Timed Charges    35767 - PT Therapeutic Exercise Minutes  10  -KB  --     09552 - Gait Training Minutes   15  -KB  --       User Key  (r) = Recorded By, (t) = Taken By, (c) = Cosigned By    Initials Name Provider Type    Sully Moreno, [place] Physical Therapy Assistant        Therapy Suggested Charges     Code   Minutes Charges    48168 (CPT®) Hc Pt Neuromusc Re Education Ea 15 Min      43957 (CPT®) Hc Pt Ther Proc Ea 15 Min 10 1    44577 (CPT®) Hc Gait Training Ea 15 Min 15 1    24792 (CPT®) Hc Pt Therapeutic Act Ea 15 Min      08397 (CPT®) Hc Pt Manual Therapy Ea 15 Min      79878 (CPT®) Hc Pt Iontophoresis Ea 15 Min      22113 (CPT®) Hc Pt Elec Stim Ea-Per 15 Min      38458 (CPT®) Hc Pt Ultrasound Ea 15 Min      71098 (CPT®) Hc Pt Self Care/Mgmt/Train Ea 15 Min      82732 (CPT®) Hc Pt Prosthetic (S) Train Initial Encounter, Each 15 Min      33196 (CPT®) Hc Pt Orthotic(S)/Prosthetic(S) Encounter, Each 15 Min      56493 (CPT®) Hc Orthotic(S) Mgmt/Train Initial Encounter, Each 15min      Total  25 2        Therapy Charges for Today      Code Description Service Date Service Provider Modifiers Qty    08985752780 HC GAIT TRAINING EA 15 MIN 12/11/2018 Sully Jauregui, PTA GP 1    57000627661 HC PT THER PROC EA 15 MIN 12/11/2018 Sully Jauregui, PTA GP 1    35953536114 HC PT THER SUPP EA 15 MIN 12/11/2018 Sully Jauregui L, PTA GP 1    84297357184 HC GAIT TRAINING EA 15 MIN 12/12/2018 Sully Jauregui, PTA GP 1    35775567602 HC PT THER PROC EA 15 MIN 12/12/2018 Sully Jauregui, PTA GP 1    00814645593 HC PT THER SUPP EA 15 MIN 12/12/2018 Sully Jauregui, PTA GP 2          PT G-Codes  Outcome Measure Options: AM-PAC 6 Clicks Basic Mobility (PT)  AM-PAC 6 Clicks Score: 17  Score: 15  Functional Limitation: Mobility: Walking and moving around  Mobility: Walking and Moving Around Current Status (): At least 40 percent but less than 60 percent impaired, limited or restricted  Mobility: Walking and Moving Around Goal Status (): At least 20 percent but less than 40 percent impaired, limited or restricted    Sully Jauregui PTA  12/12/2018

## 2018-12-13 VITALS
HEART RATE: 82 BPM | OXYGEN SATURATION: 98 % | HEIGHT: 72 IN | BODY MASS INDEX: 26.91 KG/M2 | RESPIRATION RATE: 18 BRPM | DIASTOLIC BLOOD PRESSURE: 81 MMHG | SYSTOLIC BLOOD PRESSURE: 116 MMHG | WEIGHT: 198.7 LBS | TEMPERATURE: 98.6 F

## 2018-12-13 LAB
ANION GAP SERPL CALCULATED.3IONS-SCNC: 7.8 MMOL/L (ref 3.6–11.2)
BASOPHILS # BLD AUTO: 0.03 10*3/MM3 (ref 0–0.3)
BASOPHILS NFR BLD AUTO: 0.4 % (ref 0–2)
BUN BLD-MCNC: 30 MG/DL (ref 7–21)
BUN/CREAT SERPL: 23.4 (ref 7–25)
CALCIUM SPEC-SCNC: 8.7 MG/DL (ref 7.7–10)
CHLORIDE SERPL-SCNC: 102 MMOL/L (ref 99–112)
CO2 SERPL-SCNC: 23.2 MMOL/L (ref 24.3–31.9)
CREAT BLD-MCNC: 1.28 MG/DL (ref 0.43–1.29)
CRP SERPL-MCNC: <0.5 MG/DL (ref 0–0.99)
DEPRECATED RDW RBC AUTO: 53.5 FL (ref 37–54)
EOSINOPHIL # BLD AUTO: 0.63 10*3/MM3 (ref 0–0.7)
EOSINOPHIL NFR BLD AUTO: 7.6 % (ref 0–7)
ERYTHROCYTE [DISTWIDTH] IN BLOOD BY AUTOMATED COUNT: 16.4 % (ref 11.5–14.5)
GFR SERPL CREATININE-BSD FRML MDRD: 56 ML/MIN/1.73
GLUCOSE BLD-MCNC: 145 MG/DL (ref 70–110)
GLUCOSE BLDC GLUCOMTR-MCNC: 140 MG/DL (ref 70–130)
GLUCOSE BLDC GLUCOMTR-MCNC: 195 MG/DL (ref 70–130)
HCT VFR BLD AUTO: 32.7 % (ref 42–52)
HGB BLD-MCNC: 10.7 G/DL (ref 14–18)
IMM GRANULOCYTES # BLD: 0.31 10*3/MM3 (ref 0–0.03)
IMM GRANULOCYTES NFR BLD: 3.7 % (ref 0–0.5)
LYMPHOCYTES # BLD AUTO: 1.12 10*3/MM3 (ref 1–3)
LYMPHOCYTES NFR BLD AUTO: 13.5 % (ref 16–46)
MCH RBC QN AUTO: 29.6 PG (ref 27–33)
MCHC RBC AUTO-ENTMCNC: 32.7 G/DL (ref 33–37)
MCV RBC AUTO: 90.3 FL (ref 80–94)
MONOCYTES # BLD AUTO: 1.14 10*3/MM3 (ref 0.1–0.9)
MONOCYTES NFR BLD AUTO: 13.8 % (ref 0–12)
NEUTROPHILS # BLD AUTO: 5.05 10*3/MM3 (ref 1.4–6.5)
NEUTROPHILS NFR BLD AUTO: 61 % (ref 40–75)
OSMOLALITY SERPL CALC.SUM OF ELEC: 275.1 MOSM/KG (ref 273–305)
PLATELET # BLD AUTO: 164 10*3/MM3 (ref 130–400)
PMV BLD AUTO: 10.7 FL (ref 6–10)
POTASSIUM BLD-SCNC: 4.2 MMOL/L (ref 3.5–5.3)
RBC # BLD AUTO: 3.62 10*6/MM3 (ref 4.7–6.1)
SODIUM BLD-SCNC: 133 MMOL/L (ref 135–153)
WBC NRBC COR # BLD: 8.28 10*3/MM3 (ref 4.5–12.5)

## 2018-12-13 PROCEDURE — 97116 GAIT TRAINING THERAPY: CPT

## 2018-12-13 PROCEDURE — 94799 UNLISTED PULMONARY SVC/PX: CPT

## 2018-12-13 PROCEDURE — 25010000002 ONDANSETRON PER 1 MG: Performed by: INTERNAL MEDICINE

## 2018-12-13 PROCEDURE — 93005 ELECTROCARDIOGRAM TRACING: CPT | Performed by: INTERNAL MEDICINE

## 2018-12-13 PROCEDURE — 82962 GLUCOSE BLOOD TEST: CPT

## 2018-12-13 PROCEDURE — 97530 THERAPEUTIC ACTIVITIES: CPT

## 2018-12-13 PROCEDURE — 99239 HOSP IP/OBS DSCHRG MGMT >30: CPT | Performed by: INTERNAL MEDICINE

## 2018-12-13 PROCEDURE — 63710000001 DRONABINOL PER 2.5 MG: Performed by: INTERNAL MEDICINE

## 2018-12-13 PROCEDURE — 63710000001 INSULIN ASPART PER 5 UNITS

## 2018-12-13 PROCEDURE — 80048 BASIC METABOLIC PNL TOTAL CA: CPT | Performed by: INTERNAL MEDICINE

## 2018-12-13 PROCEDURE — 85025 COMPLETE CBC W/AUTO DIFF WBC: CPT | Performed by: INTERNAL MEDICINE

## 2018-12-13 PROCEDURE — 93010 ELECTROCARDIOGRAM REPORT: CPT | Performed by: INTERNAL MEDICINE

## 2018-12-13 PROCEDURE — 97110 THERAPEUTIC EXERCISES: CPT

## 2018-12-13 PROCEDURE — 25010000002 PROMETHAZINE PER 50 MG

## 2018-12-13 PROCEDURE — 86140 C-REACTIVE PROTEIN: CPT | Performed by: NURSE PRACTITIONER

## 2018-12-13 RX ORDER — CEFDINIR 300 MG/1
300 CAPSULE ORAL EVERY 12 HOURS SCHEDULED
Qty: 20 CAPSULE | Refills: 0 | Status: SHIPPED | OUTPATIENT
Start: 2018-12-13 | End: 2018-12-26 | Stop reason: SDUPTHER

## 2018-12-13 RX ORDER — ONDANSETRON 4 MG/1
4 TABLET, FILM COATED ORAL EVERY 8 HOURS PRN
Qty: 30 TABLET | Refills: 0 | Status: SHIPPED | OUTPATIENT
Start: 2018-12-13 | End: 2018-12-18 | Stop reason: SDUPTHER

## 2018-12-13 RX ORDER — SODIUM CHLORIDE 9 MG/ML
INJECTION, SOLUTION INTRAVENOUS
Status: COMPLETED
Start: 2018-12-13 | End: 2018-12-13

## 2018-12-13 RX ORDER — METRONIDAZOLE 500 MG/1
500 TABLET ORAL EVERY 12 HOURS SCHEDULED
Qty: 20 TABLET | Refills: 0 | Status: SHIPPED | OUTPATIENT
Start: 2018-12-13 | End: 2019-01-15 | Stop reason: HOSPADM

## 2018-12-13 RX ORDER — LANSOPRAZOLE 30 MG/1
30 CAPSULE, DELAYED RELEASE ORAL
Qty: 60 CAPSULE | Refills: 0 | Status: SHIPPED | OUTPATIENT
Start: 2018-12-13 | End: 2019-01-15 | Stop reason: HOSPADM

## 2018-12-13 RX ORDER — PROMETHAZINE HYDROCHLORIDE 12.5 MG/1
12.5 TABLET ORAL EVERY 6 HOURS PRN
Qty: 30 TABLET | Refills: 0 | Status: SHIPPED | OUTPATIENT
Start: 2018-12-13 | End: 2018-12-18 | Stop reason: SDUPTHER

## 2018-12-13 RX ADMIN — SODIUM CHLORIDE, PRESERVATIVE FREE 3 ML: 5 INJECTION INTRAVENOUS at 09:15

## 2018-12-13 RX ADMIN — COLCHICINE 0.6 MG: 0.6 TABLET, FILM COATED ORAL at 09:14

## 2018-12-13 RX ADMIN — PROMETHAZINE HYDROCHLORIDE 12.5 MG: 25 INJECTION INTRAMUSCULAR; INTRAVENOUS at 07:37

## 2018-12-13 RX ADMIN — PANTOPRAZOLE SODIUM 40 MG: 40 TABLET, DELAYED RELEASE ORAL at 07:34

## 2018-12-13 RX ADMIN — METRONIDAZOLE 500 MG: 250 TABLET ORAL at 09:14

## 2018-12-13 RX ADMIN — ALLOPURINOL 100 MG: 100 TABLET ORAL at 09:14

## 2018-12-13 RX ADMIN — ONDANSETRON 8 MG: 2 INJECTION INTRAMUSCULAR; INTRAVENOUS at 11:32

## 2018-12-13 RX ADMIN — MORPHINE SULFATE 30 MG: 30 TABLET, EXTENDED RELEASE ORAL at 09:18

## 2018-12-13 RX ADMIN — ONDANSETRON 8 MG: 2 INJECTION INTRAMUSCULAR; INTRAVENOUS at 03:29

## 2018-12-13 RX ADMIN — INSULIN ASPART 2 UNITS: 100 INJECTION, SOLUTION INTRAVENOUS; SUBCUTANEOUS at 07:34

## 2018-12-13 RX ADMIN — FLECAINIDE ACETATE 50 MG: 50 TABLET ORAL at 09:14

## 2018-12-13 RX ADMIN — CHOLECALCIFEROL TAB 10 MCG (400 UNIT) 1000 UNITS: 10 TAB at 09:14

## 2018-12-13 RX ADMIN — FLUOXETINE 20 MG: 20 CAPSULE ORAL at 09:14

## 2018-12-13 RX ADMIN — CEFDINIR 300 MG: 300 CAPSULE ORAL at 09:14

## 2018-12-13 RX ADMIN — APIXABAN 5 MG: 5 TABLET, FILM COATED ORAL at 09:14

## 2018-12-13 RX ADMIN — SODIUM CHLORIDE 50 ML: 9 INJECTION, SOLUTION INTRAVENOUS at 07:39

## 2018-12-13 RX ADMIN — DRONABINOL 5 MG: 2.5 CAPSULE ORAL at 07:34

## 2018-12-13 RX ADMIN — ALBUTEROL SULFATE 2.5 MG: 2.5 SOLUTION RESPIRATORY (INHALATION) at 08:10

## 2018-12-13 RX ADMIN — MONTELUKAST SODIUM 10 MG: 10 TABLET, COATED ORAL at 09:14

## 2018-12-13 NOTE — DISCHARGE PLACEMENT REQUEST
"Jose Ya (68 y.o. Male)     Date of Birth Social Security Number Address Home Phone MRN    1950  [address]  [place] [address] [phone] [id number]    [facility] Mar[facility] Status          Other        Admission Date Admission Type Admitting Provider Attending Provider Department, Room/Bed    12/6/18 Emergency Cindy Valdez MD Heinss, Karl F, MD [address], [address]/    Discharge Date Discharge Disposition Discharge Destination         Home or Self Care              Attending Provider:  Aaron Rao MD    Allergies:  Contrast Dye    Isolation:  None   Infection:  None   Code Status:  CPR    Ht:  182.9 cm (72\")   Wt:  90.1 kg (198 lb 11.2 oz)    Admission Cmt:  None   Principal Problem:  None                Active Insurance as of 12/6/2018     Primary Coverage     Payor Plan Insurance Group Employer/Plan Group    MEDICARE MEDICARE A & B      Payor Plan Address Payor Plan Phone Number Payor Plan Fax Number Effective Dates    [address] [phone]  4/1/2005 - None Entered    [facility] [id number]       Subscriber Name Subscriber Birth Date Member ID       JOSE YA 1950 [id number]           Secondary Coverage     Payor Plan Insurance Group Employer/Plan Group    [employer] COMM[employer]CIAL GEHA - ASA [id number]     Payor Plan Address Payor Plan Phone Number Payor Plan Fax Number Effective Dates    [address]   6/16/2016 - None Entered    [facility] [id number]       Subscriber Name Subscriber Birth Date Member ID       JOSE YA 1950 [id number]                 Emergency Contacts      (Rel.) Home Phone Work Phone Mobile Phone    Emmy Ya (Spouse) [phone] -- [phone]    Carlos Ya (Son) -- -- [phone]    Richard Ya (Son) -- -- [phone]        [address] [address]  Phone:  [phone]  Fax:   Date: Dec 13, 2018      Referral to Home Health     Patient:  Jose Ya " MRN:  [id number]   [address]  [place] [address] :  1950  SSN:    Phone: [phone] Sex:  M      INSURANCE PAYOR PLAN GROUP # SUBSCRIBER ID   Primary:  Secondary:    MEDICARE  [employer] COMMERCIAL [id number]  [id number]    [id number]  [id number]      Referring Provider Information:  TRENT ADAM Phone: [phone] Fax:       Referral Information:   # Visits:  1 Referral Type: Home Health [42]   Urgency:  Routine Referral Reason: Specialty Services Required   Start Date: Dec 13, 2018 End Date:  To be determined by Insurer   Diagnosis: Malignant neoplasm of pancreas, unspecified location of malignancy (CMS/HCC) (C25.9 [ICD-10-CM] 157.9 [ICD-9-CM])  Weakness (R53.1 [ICD-10-CM] 780.79 [ICD-9-CM])      Refer to Dept:   Refer to Provider:   Refer to Facility:       Face to Face Visit Date: 2018  Follow-up Provider for Plan of Care? I treated the patient in an acute care facility and will not continue treatment after discharge.  Follow-up Provider: DUSTIN DENNEY [[id number]]  Reason/Clinical Findings: TPN  Describe mobility limitations that make leaving home difficult: weakness  Nursing/Therapeutic Services Requested: Skilled Nursing  Nursing/Therapeutic Services Requested: Physical Therapy  Nursing/Therapeutic Services Requested: Occupational Therapy  Skilled nursing orders: Medication education (CMP/CBC/mag/phos in 4 days and call to Dr Denney)  Skilled nursing orders: Pain management  Skilled nursing orders: Infusion therapy  Skilled nursing orders: Cardiopulmonary assessments  PT orders: Strengthening  PT orders: Home safety assessment  Occupational orders: Strengthening  Occupational orders: Home safety assessment  Frequency: 1 Week 1     This document serves as a request of services and does not constitute Insurance authorization or approval of services.  To determine eligibility, please contact the members Insurance carrier to verify and review coverage.     If you have  medical questions regarding this request for services. Please contact [address] at [phone] between the hours of 8:00am - 5:00pm (Mon-Fri).       Authorizing Provider:Aaron Rao MD  Authorizing Provider's NPI: [id number]  Order Entered By: Aaron Rao MD 2018 11:34 AM     Electronically signed by: Aaron Rao MD 2018 11:34 AM               History & Physical      Marianela Zepeda APRN at 2018 10:36 AM                   HISTORY AND PHYSICAL        Patient Identification:  Name:  Todd Phillips  Age:  68 y.o.  Sex:  male  :  1950  MRN:  [id number]   Visit Number:  [id number]  Primary Care Physician:  Adiel Denney MD       Subjective     Subjective     Chief complaint:     Chief Complaint   Patient presents with   • Chest Pain   • Fever       History of presenting illness:     Mr. Phillips is a 68 year old male with history of A -fib, COPD, CAD, DM, GERD, hypertension, kidney stones, pancreatic cancer, history of sepsis, and renal failure. He has recently been receiving chemotherapy but had to stop due to his acute onset of fever, and worsening weakness. Wife reports fever at home of 101.7, worsening weakness, and decreased po intake. He was recently started on TPN per oncology team. Near future plans for Whipple procedure reported per wife.White count is normal, CRP level is 3.15, Lactic acid on admission was 2.5. He has a port a cath that was placed in . It is currently accessed and without erythema or drainage.        ---------------------------------------------------------------------------------------------------------------------     Review Of Systems:    Constitutional:  fever, chills , decreased appetite ,  fatigue.  Eyes: no eye drainage, itching or redness.  HEENT: no mouth sores, dysphagia or nose bleed.  Respiratory: See HPI  Cardiovascular:  See HPI  Gastrointestinal: See HPI  Genitourinary: no dysuria or  polyuria.  Hematologic/lymphatic: See HPI  Musculoskeletal: no muscle or joint pain.  Skin: No rash and no itching.  Neurological: no loss of consciousness, no seizure, no headache.  Behavioral/Psych: no depression or suicidal ideation.  Endocrine: no hot flashes.  Immunologic: See HPI  ---------------------------------------------------------------------------------------------------------------------     Past Medical History    Past Medical History:   Diagnosis Date   • Antral gastritis    • Asthma    • Atrial fibrillation (CMS/[facility])     treated with oral blood thinner   • Chest pain    • COPD (chronic obstructive pulmonary disease) (CMS/[facility])    • Coronary artery disease     no stents   • Diabetes mellitus (CMS/[facility])     type 2    • Duodenitis    • Elevated cholesterol    • Emphysema of lung (CMS/[facility])    • Gallbladder disease     removed    • GERD (gastroesophageal reflux disease)    • Hyperlipidemia    • Hypertension    • Jaundice    • Kidney stone    • Kidney stones     had lithotripsy and passed 36 kidney stones as well as had one surgically removed.   • Malignant neoplasm of head of pancreas (CMS/[facility]) 9/5/2018   • Palpitations    • Pneumonia 08/2018   • Reflux esophagitis    • Renal failure     stage 3 kidney disease   • Sepsis (CMS/[facility])        Past Surgical History    Past Surgical History:   Procedure Laterality Date   • BILE DUCT STENT PLACEMENT      [facility] 2 weeks ago    • CARDIAC CATHETERIZATION  11/01/1999   • CARDIOVASCULAR STRESS TEST  09/2012   • COLONOSCOPY     • CYSTOSCOPY BLADDER STONE LITHOTRIPSY     • ECHO - CONVERTED  09/2012   • KIDNEY STONE SURGERY     • KIDNEY STONE SURGERY      open abdominal surgery   • UPPER GASTROINTESTINAL ENDOSCOPY  08/30/2012       Family History    Family History   Problem Relation Age of Onset   • Heart attack Mother    • Heart disease Mother    • Stroke Mother    • Kidney disease Mother    • Heart failure Mother    • Lung disease Father    •  Tuberculosis Father    • Diabetes Sister    • Heart attack Brother    • Heart failure Brother    • Heart disease Brother    • Diabetes Sister    • No Known Problems Brother    • No Known Problems Brother        Social History    Social History     Tobacco Use   • Smoking status: Never Smoker   • Smokeless tobacco: Never Used   Substance Use Topics   • Alcohol use: No   • Drug use: No       Allergies    Contrast dye  ---------------------------------------------------------------------------------------------------------------------       Objective     Objective     Hospital Scheduled Meds:    allopurinol 100 mg Oral Daily   apixaban 5 mg Oral Q12H   cefepime 2 g Intravenous Q8H   cholecalciferol 1,000 Units Oral Daily   colchicine 0.6 mg Oral Daily   dronabinol 5 mg Oral BID AC   flecainide 50 mg Oral Q12H   FLUoxetine 20 mg Oral BID   insulin aspart 0-7 Units Subcutaneous 4x Daily AC & at Bedtime   linaclotide 145 mcg Oral QAM AC   mometasone 2 puff Inhalation Nightly   montelukast 10 mg Oral Daily   Morphine 30 mg Oral Q12H   Morphine 2 mg Intravenous Once   pantoprazole 40 mg Oral QAM   pioglitazone 30 mg Oral Daily   sodium chloride 3 mL Intravenous Q12H   tiotropium bromide-olodaterol 2 puff Inhalation Daily       Pharmacy Consult      ---------------------------------------------------------------------------------------------------------------------   Vital Signs:  Temp:  [97.7 °F (36.5 °C)-99 °F (37.2 °C)] 98.3 °F (36.8 °C)  Heart Rate:  [65-97] 92  Resp:  [16-18] 18  BP: (100-157)/(61-95) 157/87  Mean Arterial Pressure (Non-Invasive) for the past 24 hrs (Last 3 readings):   Noninvasive MAP (mmHg)   12/07/18 0708 107   12/07/18 0627 97   12/07/18 0612 111     SpO2 Percentage    12/07/18 0612 12/07/18 0627 12/07/18 0708   SpO2: 99% 97% 97%     SpO2:  [93 %-100 %] 97 %  on   ;   Device (Oxygen Therapy): room air    Body mass index is 26.31 kg/m².  Wt Readings from Last 3 Encounters:   12/06/18 88 kg (194 lb)    12/04/18 88.4 kg (194 lb 12.8 oz)   11/27/18 90.7 kg (200 lb)     ---------------------------------------------------------------------------------------------------------------------     Physical Exam:    Constitutional:  Very drowsy, Pale, frail.  No respiratory distress.      HENT:  Head: Normocephalic and atraumatic.  Mouth:  Moist mucous membranes.    Eyes:  Conjunctivae and EOM are normal.  No scleral icterus.  Neck:  Neck supple.  No JVD present.    Cardiovascular:  Normal rate, regular rhythm and normal heart sounds with no murmur. No edema.  Pulmonary/Chest:  No respiratory distress, no wheezes, no crackles, with normal breath sounds and good air movement.  Abdominal:  Soft.  Bowel sounds are normal.  No distension and no tenderness.   Musculoskeletal:  No edema, no tenderness, and no deformity.  No swelling or redness of joints.  Neurological:  Alert and oriented to person, place, and time.  No facial droop.  No slurred speech.   Skin:  Skin is warm and dry. Pale  Psychiatric:   Very drowsy Normal mood and affect.  Behavior is normal.    ---------------------------------------------------------------------------------------------------------------------  I have personally reviewed the EKG/Telemetry strip  ---------------------------------------------------------------------------------------------------------------------   Results from last 7 days   Lab Units  12/06/18   2305   TROPONIN I ng/mL  0.007         Results from last 7 days   Lab Units  12/06/18   2319   PH, ARTERIAL pH units  7.530*   PO2 ART mm Hg  74.8*   PCO2, ARTERIAL mm Hg  29.7*   HCO3 ART mmol/L  24.3     Results from last 7 days   Lab Units  12/07/18   0705  12/06/18 2305 12/04/18   1149   CRP mg/dL  3.15*  2.00*   --    LACTATE mmol/L  2.5*  1.3   --    WBC 10*3/mm3  10.68  10.72  11.36   HEMOGLOBIN g/dL  11.3*  11.3*  12.5*   HEMATOCRIT %  34.6*  33.9*  38.1*   MCV fL  91.3  89.4  90.5   MCHC g/dL  32.7*  33.3  32.8*   PLATELETS  10*3/mm3  282  323  564*   INR    --   1.56*   --      Results from last 7 days   Lab Units  12/07/18   0705  12/06/18   2305  12/04/18   1149   SODIUM mmol/L  131*  132*  136   POTASSIUM mmol/L  4.2  4.2  4.1   MAGNESIUM mg/dL  1.7   --    --    CHLORIDE mmol/L  102  99  100   CO2 mmol/L  20.6*  24.0*  28.8   BUN mg/dL  30*  33*  36*   CREATININE mg/dL  1.24  1.27  1.37*   EGFR IF NONAFRICN AM mL/min/1.73  58*  56*  51*   CALCIUM mg/dL  8.6  9.1  9.8   GLUCOSE mg/dL  251*  172*  128*   ALBUMIN g/dL   --   3.80  4.20   BILIRUBIN mg/dL   --   0.4  0.3   ALK PHOS U/L   --   102  118   AST (SGOT) U/L   --   18  21   ALT (SGPT) U/L   --   18  16   Estimated Creatinine Clearance: 69 mL/min (by C-G formula based on SCr of 1.24 mg/dL).  Ammonia   Date Value Ref Range Status   12/06/2018 20 16 - 60 umol/L Final       Glucose   Date/Time Value Ref Range Status   12/07/2018 0650 252 (H) 70 - 130 mg/dL Final     Lab Results   Component Value Date    HGBA1C 6.40 (H) 11/18/2018     Lab Results   Component Value Date    TSH 1.502 11/18/2018    FREET4 1.14 10/30/2018                      Pain Management Panel     Pain Management Panel Latest Ref Rng & Units 10/16/2018 9/12/2018    CREATININE UR mg/dL 208.8 -    AMPHETAMINES SCREEN, URINE Negative - Negative    BARBITURATES SCREEN Negative - Negative    BENZODIAZEPINE SCREEN, URINE Negative - Negative    BUPRENORPHINE Negative - Negative    COCAINE SCREEN, URINE Negative - Negative    METHADONE SCREEN, URINE Negative - Negative        I have personally reviewed the above laboratory results.   ---------------------------------------------------------------------------------------------------------------------  Imaging Results (last 7 days)     Procedure Component Value Units Date/Time    XR Chest 1 View [[id number]] Collected:  12/07/18 0737     Updated:  12/07/18 1009    Narrative:       XR CHEST 1 VIEW-     CLINICAL INDICATION: fever          COMPARISON: 10/30/2018      TECHNIQUE:  Single frontal view of the chest.     FINDINGS:     Left basilar atelectasis.  The cardiac silhouette is normal. The pulmonary vasculature is  unremarkable.  There is no evidence of an acute osseous abnormality.   There are no suspicious-appearing parenchymal soft tissue nodules.            Impression:       Left basilar atelectasis.         This report was finalized on 12/7/2018 10:07 AM by Dr. Ernesto Bell MD.       XR Abdomen Flat & Upright [[id number]] Collected:  12/07/18 0737     Updated:  12/07/18 1002    Narrative:       XR ABDOMEN FLAT AND UPRIGHT-     CLINICAL INDICATION: constipation          COMPARISON: None available.      FINDINGS:  Chest:  The included view of the chest demonstrates the lungs to be well  aerated. There is no focal alveolar infiltrate or pleural effusion. The  cardiac silhouette is normal. No acute osseous abnormality is seen  within the chest.     Abdomen:  Two views of the abdomen show no free air. I do not see abnormal bowel  distention to suggest complete obstruction. The bony structures are  intact. No abnormal soft tissue masses are seen. No suspicious appearing  calcifications are identified on today's exam.       Impression:       1. Moderate stool in the colon.  2. The lungs are clear.  3. No evidence of bowel obstruction.  4. No free air.               This report was finalized on 12/7/2018 10:00 AM by Dr. Ernesto Bell MD.       CT Head Without Contrast [[id number]] Collected:  12/07/18 0737     Updated:  12/07/18 0950    Narrative:       CT HEAD WITHOUT CONTRAST-     CLINICAL INDICATION: confusion          COMPARISON: 06/20/2017      TECHNIQUE: Axial images of the brain were obtained without intravenous  contrast.  Reformatted images were created in the sagittal and coronal  planes.     DOSE: 746.31 mGy.cm     Radiation dose reduction techniques were utilized per ALARA protocol.  Automated exposure control was initiated through either or [employer] or  [employer] software  packages by  protocol.           FINDINGS:   Today's study shows no mass, hemorrhage, or midline shift.   The ventricles, cisterns, and sulci are unremarkable. There is no  hydrocephalus.   There is no evidence of acute ischemia.  I do not see epidural or subdural hematoma.  The gray-white differentiation is appropriate.   The bone window setting images show no destructive calvarial lesion or  acute calvarial fracture.   The posterior fossa is unremarkable.             Impression:       No acute intracranial pathology. Nothing is seen on this exam to  specifically account for the patient's symptoms.     This report was finalized on 12/7/2018 9:48 AM by Dr. Ernesto Bell MD.           I have personally reviewed the above radiology results.   ---------------------------------------------------------------------------------------------------------------------      Assessment & Plan        Assessment/Plan       ASSESSMENT:    1. Severe sepsis with lactic acid greater than 2 on admission  2. Fever of unknown origin  3. Immunosupression      PLAN:    Mr. Phillips is a 68 year old male with history of A -fib, COPD, CAD, DM, GERD, hypertension, kidney stones, pancreatic cancer, history of sepsis, and renal failure. He has recently been receiving chemotherapy but had to stop due to his acute onset of fever, and worsening weakness. Wife reports fever at home of 101.7, worsening weakness, and decreased po intake. He was recently started on TPN per oncology team. Near future plans for Whipple procedure reported per wife.White count is normal, CRP level is 3.15, Lactic acid on admission was 2.5. He has a port a cath that was placed in October, 2018. It is currently accessed and without erythema or drainage.      Patient had a prior episode of bacteremia and is at very high risk for relapsing bacteremia.    In the setting of risk of relapsing bacteremia vancomycin pharmacy to dose and Rocephin 2 gm IV q 24 hrs, and  Micafungin 100 mg IV q 24 hrs was initiated.    Continue TPN as previously recommended per oncology team.     Continue to monitor blood cultures closely.     Patient's findings and recommendations were discussed with patient and nursing staff      Code Status:   Code Status and Medical Interventions:   Ordered at: 12/07/18 0635     Code Status:    CPR     Medical Interventions (Level of Support Prior to Arrest):    Full           Marianela TUTTLE Duane, VIMAL  12/07/18  10:36 AM    Electronically signed by Davin Faye MD at 12/10/2018  1:04 PM       ICU Vital Signs     Row Name 12/13/18 0819 12/13/18 0810 12/13/18 0612 12/13/18 0500 12/13/18 0300       Height and Weight    Weight  --  --  --  90.1 kg (198 lb 11.2 oz)  --    Weight Method  --  --  --  Bed scale  --       Vitals    Temp  --  --  98.6 °F (37 °C)  --  --    Temp src  --  --  Oral  --  --    Pulse  82  85  82  --  82    Heart Rate Source  Monitor  Monitor  Monitor  --  Monitor    Resp  18  18  20  --  18    Resp Rate Source  Visual  Visual  Visual  --  Visual    BP  --  --  116/81  --  131/86    BP Location  --  --  Left arm  --  Left arm    BP Method  --  --  Automatic  --  Automatic    Patient Position  --  --  Lying  --  Lying       Oxygen Therapy    SpO2  98 %  96 %  --  --  --    Device (Oxygen Therapy)  --  room air  --  --  --    Row Name 12/12/18 2300 12/12/18 2101 12/12/18 1926 12/12/18 1841 12/12/18 1500       Vitals    Temp  --  --  --  98.3 °F (36.8 °C)  98 °F (36.7 °C)    Temp src  --  --  --  Oral  Oral    Pulse  80  --  --  80  75    Heart Rate Source  Monitor  --  --  Monitor  Monitor    Resp  18  --  --  18  16    Resp Rate Source  Visual  --  --  Visual  Visual    BP  107/72  --  --  122/77  107/70    BP Location  Left arm  --  --  Left arm  Left arm    BP Method  Automatic  --  --  Automatic  Automatic    Patient Position  Lying  --  --  Lying  Sitting       Oxygen Therapy    SpO2  --  --  92 %  --  --    Device (Oxygen Therapy)  --   room air  room air  --  --            Orders (last 24 hrs)     Start     Ordered    12/13/18 1132  POC Glucose Once  Once      12/13/18 1123    12/13/18 1129  Discontinue IV  Once      12/13/18 1134    12/13/18 1118  Discharge patient  Once      12/13/18 1134    12/13/18 0736  sodium chloride 0.9 % infusion  - ADS Override Pull     Comments:  Created by cabinet override    12/13/18 0736    12/13/18 0719  POC Glucose Once  Once      12/13/18 0708    12/13/18 0622  Osmolality, Calculated  Once      12/13/18 0621    12/13/18 0602  Scan Slide  Once,   Status:  Canceled      12/13/18 0601    12/13/18 0600  C-reactive Protein  Morning Draw      12/12/18 1010    12/13/18 0600  Basic Metabolic Panel  Morning Draw      12/12/18 1239    12/13/18 0600  CBC & Differential  Morning Draw      12/12/18 1239    12/13/18 0600  CBC Auto Differential  PROCEDURE ONCE      12/13/18 0002    12/13/18 0500  ECG 12 Lead  Tomorrow AM      12/12/18 1252    12/13/18 0000  cefdinir (OMNICEF) 300 MG capsule  Every 12 Hours Scheduled      12/13/18 1134    12/13/18 0000  metroNIDAZOLE (FLAGYL) 500 MG tablet  Every 12 Hours Scheduled      12/13/18 1134    12/13/18 0000  lansoprazole (PREVACID) 30 MG capsule  Daily With Breakfast & Dinner      12/13/18 1134    12/13/18 0000  Discharge Follow-up with Specified Provider: Dr Hale as already scheduled next week in [place]      12/13/18 1134    12/13/18 0000  Referral to Home Health      12/13/18 1134    12/13/18 0000  Diet: Regular      12/13/18 1134    12/13/18 0000  Discharge Instructions     Comments:  Continue Home TPN as previously ordered    12/13/18 1134    12/13/18 0000  promethazine (PHENERGAN) 12.5 MG tablet  Every 6 Hours PRN      12/13/18 1134    12/13/18 0000  ondansetron (ZOFRAN) 4 MG tablet  Every 8 Hours PRN      12/13/18 1134    12/12/18 2129  POC Glucose Once  Once      12/12/18 2122 12/12/18 2110  sodium chloride 0.9 % infusion  - ADS Override Pull     Comments:  Created by  cabinet override    12/12/18 2110    12/12/18 1737  sodium chloride 0.9 % infusion  - ADS Override Pull     Comments:  Created by cabinet override    12/12/18 1737    12/12/18 1730  pantoprazole (PROTONIX) EC tablet 40 mg  2 Times Daily Before Meals      12/12/18 0852    12/12/18 1645  POC Glucose Once  Once      12/12/18 1632    12/12/18 1330  metroNIDAZOLE (FLAGYL) tablet 500 mg  Every 12 Hours Scheduled      12/12/18 1244    12/12/18 1330  cefdinir (OMNICEF) capsule 300 mg  Every 12 Hours Scheduled      12/12/18 1244    12/12/18 1202  sodium chloride 0.9 % infusion  - ADS Override Pull     Comments:  Created by cabinet override    12/12/18 1202    12/12/18 0644  LORazepam (ATIVAN) tablet 0.5 mg  Every 6 Hours PRN      12/12/18 0645    12/11/18 1800  promethazine (PHENERGAN) injection 12.5 mg  4 Times Daily      12/11/18 1415    12/11/18 1215  ondansetron (ZOFRAN) 8 mg in sodium chloride 0.9 % 50 mL IVPB  Every 8 Hours      12/11/18 1118    12/11/18 1200  vancomycin (VANCOCIN) 1,500 mg in sodium chloride 0.9 % 500 mL IVPB  Every 24 Hours,   Status:  Discontinued      12/11/18 0942    12/10/18 1200  multiple vitamin 10 mL, trace elements Cr-Cu-Mn-Zn 5 mL in sodium chloride 0.9 % 500 mL IVPB  Once per day on Mon Wed Fri 12/07/18 1517    12/08/18 1700  micafungin sodium (MYCAMINE) 100 mg in sodium chloride 0.9 % 100 mL IVPB  Every 24 Hours,   Status:  Discontinued      12/08/18 1518    12/08/18 1617  morphine injection 1 mg  Every 4 Hours PRN      12/08/18 1619    12/07/18 2100  mometasone (ASMANEX) 220 MCG/INH inhaler 2 puff  Nightly      12/07/18 0842    12/07/18 1800  fat emulsion (INTRALIPID,LIPOSYN) 20 % infusion 50 g  Once per day on Mon Wed Fri 12/07/18 1517    12/07/18 1800  insulin aspart (novoLOG) injection 0-7 Units  Every 6 Hours Scheduled      12/07/18 1517    12/07/18 1600  linaclotide (LINZESS) capsule 145 mcg  Every Morning Before Breakfast      12/07/18 0842    12/07/18 1600  tiotropium  bromide-olodaterol (STIOLTO RESPIMAT) 2.5-2.5 MCG/ACT inhaler 2 puff  Daily      12/07/18 0842    12/07/18 1530  cefepime (MAXIPIME) 2 g/100 mL 0.9% NS (mbp)  Every 8 Hours,   Status:  Discontinued      12/07/18 0637    12/07/18 1515  Adult Central Clinimix TPN  Every 24 Hours Scheduled (TPN)      12/07/18 1517    12/07/18 1255  oxyCODONE (ROXICODONE) immediate release tablet 5 mg  Every 4 Hours PRN      12/07/18 1256    12/07/18 1100  POC Glucose 4x Daily AC & at Bedtime  4 Times Daily Before Meals & at Bedtime      12/07/18 0842    12/07/18 1000  allopurinol (ZYLOPRIM) tablet 100 mg  Daily      12/07/18 0842    12/07/18 1000  apixaban (ELIQUIS) tablet 5 mg  Every 12 Hours Scheduled      12/07/18 0842    12/07/18 1000  cholecalciferol (VITAMIN D3) tablet 1,000 Units  Daily      12/07/18 0842    12/07/18 1000  colchicine tablet 0.6 mg  Daily      12/07/18 0842    12/07/18 1000  flecainide (TAMBOCOR) tablet 50 mg  Every 12 Hours Scheduled      12/07/18 0842    12/07/18 1000  FLUoxetine (PROzac) capsule 20 mg  2 Times Daily      12/07/18 0842    12/07/18 1000  montelukast (SINGULAIR) tablet 10 mg  Daily      12/07/18 0842    12/07/18 0915  Morphine (MS CONTIN) 12 hr tablet 30 mg  Every 12 Hours      12/07/18 0842    12/07/18 0900  sodium chloride 0.9 % flush 3 mL  Every 12 Hours Scheduled      12/07/18 0635    12/07/18 0900  dronabinol (MARINOL) capsule 5 mg  2 Times Daily Before Meals      12/07/18 0847    12/07/18 0841  dextrose (GLUTOSE) oral gel 15 g  Every 15 Minutes PRN      12/07/18 0842    12/07/18 0841  dextrose (D50W) 25 g/ 50mL Intravenous Solution 25 g  Every 15 Minutes PRN      12/07/18 0842    12/07/18 0841  glucagon (human recombinant) (GLUCAGEN DIAGNOSTIC) injection 1 mg  As Needed      12/07/18 0842    12/07/18 0829  albuterol (PROVENTIL) nebulizer solution 0.083% 2.5 mg/3mL  Every 4 Hours PRN      12/07/18 0842    12/07/18 0827  nitroglycerin (NITROSTAT) SL tablet 0.4 mg  As Needed      12/07/18 0842     12/07/18 0827  polyethylene glycol (MIRALAX) powder 17 g  Daily PRN      12/07/18 0842    12/07/18 0637  Pharmacy Consult  Continuous PRN      12/07/18 0638    12/07/18 0635  aluminum-magnesium hydroxide-simethicone (MAALOX MAX) 400-400-40 MG/5ML suspension 15 mL  Every 6 Hours PRN      12/07/18 0635    12/07/18 0634  Magnesium Sulfate 2 gram Bolus, followed by 8 gram infusion (total Mg dose 10 grams)- Mg less than or equal to 1mg/dL  As Needed      12/07/18 0635    12/07/18 0634  Magnesium Sulfate 2 gram / 50mL Infusion (GIVE X 3 BAGS TO EQUAL 6GM TOTAL DOSE) - Mg 1.1 - 1.5 mg/dl  As Needed      12/07/18 0635    12/07/18 0634  Magnesium Sulfate 4 gram infusion- Mg 1.6-1.9 mg/dL  As Needed      12/07/18 0635    12/07/18 0634  potassium chloride (K-DUR,KLOR-CON) CR tablet 40 mEq  As Needed      12/07/18 0635    12/07/18 0634  potassium chloride (KLOR-CON) packet 40 mEq  As Needed      12/07/18 0635    12/07/18 0634  potassium chloride 10 mEq in 100 mL IVPB  Every 1 Hour PRN      12/07/18 0635    12/07/18 0634  acetaminophen (TYLENOL) tablet 650 mg  Every 4 Hours PRN      12/07/18 0635    12/07/18 0631  sodium chloride 0.9 % flush 3-10 mL  As Needed      12/07/18 0635    12/06/18 2258  sodium chloride 0.9 % flush 10 mL  As Needed      12/06/18 2258    Unscheduled  Magnesium  As Needed      12/07/18 0635    Unscheduled  Potassium  As Needed      12/07/18 0635    Unscheduled  Change Dressing to IV Site As Needed When Damp, Loose or Soiled  As Needed      12/07/18 0717    Unscheduled  Change Needleless Connectors  As Needed     Comments:  Change Needleless Connectors When:  - Removed For Any Reason  - Residual Blood or Debris Within Connector  - Prior to Drawing Blood Cultures  - Contamination of Connector  - After Administration of Blood or Blood Components    12/07/18 0717    Unscheduled  Change Tubing  As Needed     Comments:  1) Change Lipid tubing daily, unless lipid emulsion not infusing continuously, then  change lipid tubing with every bottle change. 2) Change other IV tubing and cassette every 72 hours.    12/07/18 1517    Unscheduled  ECG 12 Lead  As Needed     Comments:  Nurse to Release if Patient Expericences Acute Chest Pain or Dysrhythmias    12/08/18 1421    Unscheduled  Potassium  As Needed     Comments:  For Ventricular Arrhythmias      12/08/18 1421    Unscheduled  Magnesium  As Needed     Comments:  For Ventricular Arrhythmias      12/08/18 1421    Unscheduled  Troponin  As Needed     Comments:  For Chest Pain      12/08/18 1421    Unscheduled  Digoxin Level  As Needed     Comments:  For Atrial Arrhythmias      12/08/18 1421    Unscheduled  Blood Gas, Arterial  As Needed     Comments:  Per O2 PolicyNotify Physician      12/08/18 1421    --  albuterol (PROVENTIL) (2.5 MG/3ML) 0.083% nebulizer solution  2 Times Daily PRN      12/07/18 0618    --  oxyCODONE (ROXICODONE) 5 MG immediate release tablet  Every 4 Hours PRN      12/07/18 1208    --  SCANNED - TELEMETRY        12/06/18 0000    --  SCANNED - TELEMETRY        12/06/18 0000          Operative/Procedure Notes (most recent note)     No notes of this type exist for this encounter.           Physician Progress Notes (most recent note)      Angelita Ignacio APRN at 12/12/2018 12:45 PM          Palliative Care Progress Note    Date of Admission: 12/6/2018    Code Status:   Current Code Status     Date Active Code Status Order ID Comments User Context       12/7/2018 06:35 CPR [id number]  Marianela Zepeda APRN ED       Questions for Current Code Status     Question Answer Comment    Code Status CPR     Medical Interventions (Level of Support Prior to Arrest) Full         Advance Directive: None  Surrogate decision maker: Emmy, wife     Subjective:     Patient sitting up in chair, beginning to eat.  Wife at bedside also. Dr. Rao just left room and GOC- PLAN in place.  Patient and wife are content with plan.  He states very little nausea right now.   "Phenergan and zofran scheduled seems to be helping he says.  He also states pain is much better and tolerable.     Reviewed current scheduled and prn medications for route, type, dose, and frequency.    Using no PRN pain meds.     Adult [facility] TPN  Last Rate: 80 mL/hr at 12/11/18 1920   Pharmacy Consult       •  acetaminophen  •  albuterol  •  aluminum-magnesium hydroxide-simethicone  •  dextrose  •  dextrose  •  glucagon (human recombinant)  •  LORazepam  •  magnesium sulfate **OR** magnesium sulfate **OR** magnesium sulfate  •  Morphine  •  nitroglycerin  •  oxyCODONE  •  Pharmacy Consult  •  polyethylene glycol  •  potassium chloride **OR** potassium chloride **OR** potassium chloride  •  [COMPLETED] Insert peripheral IV **AND** sodium chloride  •  sodium chloride    Objective:  PPS 40 /70 (BP Location: Left arm, Patient Position: Sitting)   Pulse 82   Temp 98.1 °F (36.7 °C) (Oral)   Resp 16   Ht 182.9 cm (72\")   Wt 89.4 kg (197 lb 1.6 oz)   SpO2 97%   BMI 26.73 kg/m²     Intake & Output (last day)       12/11 0701 - 12/12 0700 12/12 0701 - 12/13 0700    P.O. 360 600    IV Piggyback 100     Total Intake(mL/kg) 460 (5.1) 600 (6.7)    Net +460 +600          Urine Unmeasured Occurrence 7 x 2 x    Stool Unmeasured Occurrence 0 x         Lab Results (last 24 hours)     Procedure Component Value Units Date/Time    POC Glucose Once [[id number]]  (Abnormal) Collected:  12/12/18 1125    Specimen:  Blood Updated:  12/12/18 1143     Glucose 137 mg/dL     POC Glucose Once [[id number]]  (Abnormal) Collected:  12/12/18 0647    Specimen:  Blood Updated:  12/12/18 0654     Glucose 151 mg/dL     CBC & Differential [[id number]] Collected:  12/12/18 0445    Specimen:  Blood Updated:  12/12/18 0542    Narrative:       The following orders were created for panel order CBC & Differential.  Procedure                               Abnormality         Status                     ---------                               " -----------         ------                     Manual Differential[[id number]]                              Final result               Scan Slide[[id number]]                                       Final result               CBC Auto Differential[[id number]]        Abnormal            Final result                 Please view results for these tests on the individual orders.    CBC Auto Differential [[id number]]  (Abnormal) Collected:  12/12/18 0445    Specimen:  Blood Updated:  12/12/18 0542     WBC 7.35 10*3/mm3      RBC 3.72 10*6/mm3      Hemoglobin 11.0 g/dL      Hematocrit 34.4 %      MCV 92.5 fL      MCH 29.6 pg      MCHC 32.0 g/dL      RDW 16.4 %      RDW-SD 53.1 fl      MPV 10.1 fL      Platelets 189 10*3/mm3     Scan Slide [[id number]] Collected:  12/12/18 0445    Specimen:  Blood Updated:  12/12/18 0542     Scan Slide --     Comment: See Manual Differential Results       Manual Differential [[id number]] Collected:  12/12/18 0445    Specimen:  Blood Updated:  12/12/18 0542     Neutrophil % 59.0 %      Lymphocyte % 26.0 %      Monocyte % 7.0 %      Eosinophil % 5.0 %      Bands %  3.0 %      Neutrophils Absolute 4.56 10*3/mm3      Lymphocytes Absolute 1.91 10*3/mm3      Monocytes Absolute 0.51 10*3/mm3      Eosinophils Absolute 0.37 10*3/mm3      Anisocytosis Slight/1+     Hypochromia Slight/1+     Platelet Morphology Normal    C-reactive Protein [[id number]]  (Normal) Collected:  12/12/18 0445    Specimen:  Blood Updated:  12/12/18 0537     C-Reactive Protein <0.50 mg/dL     Comprehensive Metabolic Panel [[id number]]  (Abnormal) Collected:  12/12/18 0445    Specimen:  Blood Updated:  12/12/18 0535     Glucose 137 mg/dL      BUN 25 mg/dL      Creatinine 1.16 mg/dL      Sodium 133 mmol/L      Potassium 4.1 mmol/L      Chloride 104 mmol/L      CO2 23.5 mmol/L      Calcium 9.1 mg/dL      Total Protein 6.3 g/dL      Albumin 3.50 g/dL      ALT (SGPT) 15 U/L      AST (SGOT) 21 U/L      Alkaline Phosphatase 85 U/L       Comment: Note New Reference Ranges        Total Bilirubin 0.4 mg/dL      eGFR Non African Amer 62 mL/min/1.73      Globulin 2.8 gm/dL      A/G Ratio 1.3 g/dL      BUN/Creatinine Ratio 21.6     Anion Gap 5.5 mmol/L     Osmolality, Calculated [[id number]]  (Abnormal) Collected:  12/12/18 0445    Specimen:  Blood Updated:  12/12/18 0535     Osmolality Calc 272.9 mOsm/kg     Magnesium [[id number]]  (Normal) Collected:  12/12/18 0445    Specimen:  Blood Updated:  12/12/18 0535     Magnesium 2.1 mg/dL     Blood Culture - Blood, Arm, Right [[id number]] Collected:  12/06/18 2312    Specimen:  Blood from Arm, Right Updated:  12/11/18 2346     Blood Culture No growth at 5 days    Blood Culture - Blood, Arm, Left [[id number]] Collected:  12/06/18 2305    Specimen:  Blood from Arm, Left Updated:  12/11/18 2346     Blood Culture No growth at 5 days    POC Glucose Once [[id number]]  (Abnormal) Collected:  12/11/18 2218    Specimen:  Blood Updated:  12/11/18 2229     Glucose 161 mg/dL     POC Glucose Once [[id number]]  (Abnormal) Collected:  12/11/18 1631    Specimen:  Blood Updated:  12/11/18 1646     Glucose 144 mg/dL         Imaging Results (last 24 hours)     ** No results found for the last 24 hours. **          Physical Exam:  Gen: NAD, sitting up in chair   Skin: warm, dry  Eyes: FREDDIE, conjunctiva clear and moist  HEENT: oral cavity moist, no lesions or thrush noted   Resp/thorax: even effort, non labored, CTA  CV: RRR   ABD: soft, slightly tender (did not palpate hard), bowel sounds normal   Neuro: alert, interactive, no myoclonus   Psych: appropriate conversation and mood    Reviewed labs and diagnostic results.   Lab Results   Component Value Date    HGBA1C 7.00 (H) 12/08/2018     Results from last 7 days   Lab Units  12/12/18 0445   WBC 10*3/mm3  7.35   HEMOGLOBIN g/dL  11.0*   HEMATOCRIT %  34.4*   PLATELETS 10*3/mm3  189     Results from last 7 days   Lab Units  12/12/18 0445   SODIUM mmol/L  133*   POTASSIUM mmol/L   "4.1   CHLORIDE mmol/L  104   CO2 mmol/L  23.5*   BUN mg/dL  25*   CREATININE mg/dL  1.16   CALCIUM mg/dL  9.1   BILIRUBIN mg/dL  0.4   ALK PHOS U/L  85   ALT (SGPT) U/L  15   AST (SGOT) U/L  21   GLUCOSE mg/dL  137*       Impression: 68 y.o. male with pancreatic cancer, recurrent sepsis from GI source    Plan:     1.  Goals of Care   - Plan is to discharge (hopefully) tomorrow, and go home with HH and PT and get some strength built up.  Then have WHIPPLE surgery the first of January.  Dr. Thomson did mention to family yesterday to have back up plans \"just in case\"     2.  Abd Pain   - Controlled on scheduled morphine ER.  Pt have oxycodone IR for BTP and is not using it at all.  He states pain is much much better today and he is tolerating it well.  He would rate his pain a 4/10- pain generally is around the umbilicus and the left side.  He denies any radiation to his back.  I have encouraged to use PRN pain meds if he has BTP.      3.  Nausea/vomiting    - Improved with scheduled zofran and phenergan.  I have discussed with him and wife about taking both at home on a set schedule like they have here.  Both verbalize understanding.  And as Dr. Thomson suggested, if nausea continues and becomes less controlled, haldol/compazine/reglan are good alternatives.      We appreciate the consult.  Patient symptoms seem to be controlled.  The GOC are established and a plan is in place by hospitalist and Dr. Alves.  We will sign off at this time, and if we can be of assistance again with Mr. Phillips, please do not hesitate to re-consult.     Time: 30 minutes   > 50% time spent in counseling and education concerning current orders for symptom management with nurse, wife, patient, palliative team.     VIMAL Weber  [phone]  12/12/18  1:20 PM    Electronically signed by Angelita Ignacio APRN at 12/12/2018  1:35 PM          Consult Notes (most recent note)      Nneka Thomson MD at 12/11/2018  4:31 PM      " Consult Orders    1. Inpatient Palliative Care MD Consult [[id number]] ordered by Aaron Rao MD at 12/10/18 6216                Palliative Care Initial Consult     Attending Physician: Aaron Rao MD  Referring Provider: Dr. Rao    assistance with clarification of goals of care, non-pain symptoms and pain  Code Status:   Code Status and Medical Interventions:   Ordered at: 12/07/18 0635     Code Status:    CPR     Medical Interventions (Level of Support Prior to Arrest):    Full      Advanced Directives: Advance Directive Status: Patient does not have advance directive   Healthcare surrogate: Wife  Goals of Care: To get strong enough to have Whipple performed.    HPI:  Todd Phillips is a 68 y.o. male admitted on 12/6 with sepsis. Pt was diagnosed with pancreatic ca following gallbladder surgery in August. He has had multiple hospitalizations for sepsis related to this. He has been followed by Dr. Adamson in [place] for possible Whipple, and Dr. Alves locally for chemo prior to surgery. He has been started on TPN at home to help him regain strength. We were consulted for sx mgmt and GOC.     Pt reports that his n/v is better on scheduled zofran and phenergan. He doesn't like to take the phenergan due to sedation. Denies trial of reglan or haldol in the past. Has been on compazine. He also reports his abd pain is mostly after vomiting. He doesn't take his PRN very often, here or at home. He does have issues with constipation despite being started on Linzess and Miralax at home. He last went on 12/9. He denies trouble sleeping. He does admit to some problems with his mood. He otherwise denies complaints other than being frustrated that he hasn't been able to get to surgery and fear that he may not be able to get to surgery now.         ROS: Negative except as above in HPI.     Past Medical History:   Diagnosis Date   • Antral gastritis    • Asthma    • Atrial fibrillation (CMS/HCC)     treated with oral  blood thinner   • Chest pain    • COPD (chronic obstructive pulmonary disease) (CMS/HCC)    • Coronary artery disease     no stents   • Diabetes mellitus (CMS/HCC)     type 2    • Duodenitis    • Elevated cholesterol    • Emphysema of lung (CMS/HCC)    • Gallbladder disease     removed    • GERD (gastroesophageal reflux disease)    • Hyperlipidemia    • Hypertension    • Jaundice    • Kidney stone    • Kidney stones     had lithotripsy and passed 36 kidney stones as well as had one surgically removed.   • Malignant neoplasm of head of pancreas (CMS/HCC) 9/5/2018   • Palpitations    • Pneumonia 08/2018   • Reflux esophagitis    • Renal failure     stage 3 kidney disease   • Sepsis (CMS/HCC)      Past Surgical History:   Procedure Laterality Date   • BILE DUCT STENT PLACEMENT      [facility] 2 weeks ago    • CARDIAC CATHETERIZATION  11/01/1999   • CARDIOVASCULAR STRESS TEST  09/2012   • COLONOSCOPY     • CYSTOSCOPY BLADDER STONE LITHOTRIPSY     • ECHO - CONVERTED  09/2012   • KIDNEY STONE SURGERY     • KIDNEY STONE SURGERY      open abdominal surgery   • UPPER GASTROINTESTINAL ENDOSCOPY  08/30/2012     Social History     Socioeconomic History   • Marital status:      Spouse name: Not on file   • Number of children: Not on file   • Years of education: Not on file   • Highest education level: Not on file   Social Needs   • Financial resource strain: Not on file   • Food insecurity - worry: Not on file   • Food insecurity - inability: Not on file   • Transportation needs - medical: Not on file   • Transportation needs - non-medical: Not on file   Occupational History   • Not on file   Tobacco Use   • Smoking status: Never Smoker   • Smokeless tobacco: Never Used   Substance and Sexual Activity   • Alcohol use: No   • Drug use: No   • Sexual activity: Defer     Partners: Female   Other Topics Concern   • Not on file   Social History Narrative    He is  and lives alone with his wife although  they have 3 children together who all live close by. He is retired from the [employer] and was exposed to Agent Orange during [place]. He is a lifelong nonsmoker.      Family History   Problem Relation Age of Onset   • Heart attack Mother    • Heart disease Mother    • Stroke Mother    • Kidney disease Mother    • Heart failure Mother    • Lung disease Father    • Tuberculosis Father    • Diabetes Sister    • Heart attack Brother    • Heart failure Brother    • Heart disease Brother    • Diabetes Sister    • No Known Problems Brother    • No Known Problems Brother        Allergies   Allergen Reactions   • Contrast Dye Other (See Comments)     Can't have due to kidney failure per family       Current Facility-Administered Medications   Medication Dose Route Frequency Provider Last Rate Last Dose   • acetaminophen (TYLENOL) tablet 650 mg  650 mg Oral Q4H PRN Davin Faye MD       • Adult [facility] TPN   Intravenous Q24H (TPN) Rebeca Zepeda, [facility] 80 mL/hr at 12/10/18 1847      And   • fat emulsion (INTRALIPID,LIPOSYN) 20 % infusion 50 g  250 mL Intravenous Once per day on Mon Wed Fri Rebeca Zepeda [facility] 20.8 mL/hr at 12/10/18 1755 50 g at 12/10/18 1755    And   • multiple vitamin 10 mL, trace elements Cr-Cu-Mn-Zn 5 mL in sodium chloride 0.9 % 500 mL IVPB   Intravenous Once per day on Mon Wed Fri Rebeca Zepeda, H 128.8 mL/hr at 12/10/18 1202     • albuterol (PROVENTIL) nebulizer solution 0.083% 2.5 mg/3mL  2.5 mg Nebulization Q4H PRN Davin Faye MD   2.5 mg at 12/11/18 0701   • allopurinol (ZYLOPRIM) tablet 100 mg  100 mg Oral Daily Davin Faye MD   100 mg at 12/11/18 0839   • aluminum-magnesium hydroxide-simethicone (MAALOX MAX) 400-400-40 MG/5ML suspension 15 mL  15 mL Oral Q6H PRN Davin Faye MD       • apixaban (ELIQUIS) tablet 5 mg  5 mg Oral Q12H Davin Faye MD   5 mg at 12/11/18 0839   • cefepime (MAXIPIME) 2 g/100 mL 0.9% NS (mbp)  2 g  Intravenous Q8H Davin Faye MD 0 mL/hr at 12/08/18 0500 2 g at 12/11/18 1555   • cholecalciferol (VITAMIN D3) tablet 1,000 Units  1,000 Units Oral Daily Davin Faye MD   1,000 Units at 12/11/18 0839   • colchicine tablet 0.6 mg  0.6 mg Oral Daily Davin Faye MD   0.6 mg at 12/11/18 0839   • dextrose (D50W) 25 g/ 50mL Intravenous Solution 25 g  25 g Intravenous Q15 Min PRN Davin Faye MD       • dextrose (GLUTOSE) oral gel 15 g  15 g Oral Q15 Min PRN Davin Faye MD       • dronabinol (MARINOL) capsule 5 mg  5 mg Oral BID  Davin Faye MD   5 mg at 12/11/18 0747   • flecainide (TAMBOCOR) tablet 50 mg  50 mg Oral Q12H Davin Faye MD   50 mg at 12/11/18 0839   • FLUoxetine (PROzac) capsule 20 mg  20 mg Oral BID Davin Faye MD   20 mg at 12/11/18 0839   • glucagon (human recombinant) (GLUCAGEN DIAGNOSTIC) injection 1 mg  1 mg Subcutaneous PRN Davin Faye MD       • insulin aspart (novoLOG) injection 0-7 Units  0-7 Units Subcutaneous Q6H Rebeca Zepeda [facility]   2 Units at 12/11/18 1122   • linaclotide (LINZESS) capsule 145 mcg  145 mcg Oral QAM  Davin Faye MD   145 mcg at 12/11/18 0743   • LORazepam (ATIVAN) tablet 0.5 mg  0.5 mg Oral Q6H PRN Sarah Arce PA       • Magnesium Sulfate 2 gram Bolus, followed by 8 gram infusion (total Mg dose 10 grams)- Mg less than or equal to 1mg/dL  2 g Intravenous PRN Davin Faye MD        Or   • Magnesium Sulfate 2 gram / 50mL Infusion (GIVE X 3 BAGS TO EQUAL 6GM TOTAL DOSE) - Mg 1.1 - 1.5 mg/dl  2 g Intravenous PRN Davin Faye MD        Or   • Magnesium Sulfate 4 gram infusion- Mg 1.6-1.9 mg/dL  4 g Intravenous PRN Davin Faye MD       • micafungin sodium (MYCAMINE) 100 mg in sodium chloride 0.9 % 100 mL IVPB  100 mg Intravenous Q24H OSarah Rojas PA   100 mg at 12/10/18 5195   • mometasone (ASMANEX) 220 MCG/INH inhaler 2 puff  2 puff Inhalation Nightly  Davin Faye MD   2 puff at 12/08/18 2100   • montelukast (SINGULAIR) tablet 10 mg  10 mg Oral Daily Davin Faye MD   10 mg at 12/11/18 0839   • Morphine (MS CONTIN) 12 hr tablet 30 mg  30 mg Oral Q12H ANTOINETTE'Sarah Nick, PA   30 mg at 12/11/18 0839   • morphine injection 1 mg  1 mg Intravenous Q4H PRN Cindy Valdez MD       • nitroglycerin (NITROSTAT) SL tablet 0.4 mg  0.4 mg Sublingual PRN Davin Faye MD       • ondansetron (ZOFRAN) 8 mg in sodium chloride 0.9 % 50 mL IVPB  8 mg Intravenous Q8H Faiza Toro MD   Stopped at 12/11/18 1355   • oxyCODONE (ROXICODONE) immediate release tablet 5 mg  5 mg Oral Q4H PRN ANTOINETTE'Sarah Nick PA   5 mg at 12/08/18 0449   • pantoprazole (PROTONIX) EC tablet 40 mg  40 mg Oral QAM Davin Faye MD   40 mg at 12/11/18 0529   • Pharmacy Consult   Does not apply Continuous PRN Davin Faye MD       • polyethylene glycol (MIRALAX) powder 17 g  17 g Oral Daily PRN Davin Faye MD       • potassium chloride (K-DUR,KLOR-CON) CR tablet 40 mEq  40 mEq Oral PRN Davin Faye MD        Or   • potassium chloride (KLOR-CON) packet 40 mEq  40 mEq Oral PRN Davin Faye MD        Or   • potassium chloride 10 mEq in 100 mL IVPB  10 mEq Intravenous Q1H PRN Davin Faye MD       • prochlorperazine (COMPAZINE) tablet 10 mg  10 mg Oral Q4H PRN Davin Faye MD       • promethazine (PHENERGAN) injection 12.5 mg  12.5 mg Intravenous 4x Daily Osvaldo Callaway, [facility]       • sodium chloride 0.9 % flush 10 mL  10 mL Intravenous PRN Aisha Wolfe,        • sodium chloride 0.9 % flush 3 mL  3 mL Intravenous Q12H Davin Faye MD   3 mL at 12/11/18 0839   • sodium chloride 0.9 % flush 3-10 mL  3-10 mL Intravenous PRN Davin Faye MD       • tiotropium bromide-olodaterol (STIOLTO RESPIMAT) 2.5-2.5 MCG/ACT inhaler 2 puff  2 puff Inhalation Daily Davin Faye MD   2 puff at 12/11/18 1012  "  • vancomycin (VANCOCIN) 1,500 mg in sodium chloride 0.9 % 500 mL IVPB  1,500 mg Intravenous Q24H Cinthia Nettles, [facility]   1,500 mg at 12/11/18 1146     Facility-Administered Medications Ordered in Other Encounters   Medication Dose Route Frequency Provider Last Rate Last Dose   • sodium chloride 0.9 % infusion  - ADS Override Pull            • sodium chloride 0.9 % infusion  - ADS Override Pull                Adult [facility] TPN  Last Rate: 80 mL/hr at 12/10/18 1847   Pharmacy Consult       •  acetaminophen  •  albuterol  •  aluminum-magnesium hydroxide-simethicone  •  dextrose  •  dextrose  •  glucagon (human recombinant)  •  LORazepam  •  magnesium sulfate **OR** magnesium sulfate **OR** magnesium sulfate  •  Morphine  •  nitroglycerin  •  oxyCODONE  •  Pharmacy Consult  •  polyethylene glycol  •  potassium chloride **OR** potassium chloride **OR** potassium chloride  •  prochlorperazine  •  [COMPLETED] Insert peripheral IV **AND** sodium chloride  •  sodium chloride    Current medication reviewed for route, type, dose and frequency and are current per MAR.    Palliative Performance Scale Score:     /76 (BP Location: Left arm, Patient Position: Lying)   Pulse 74   Temp 97.7 °F (36.5 °C) (Oral)   Resp 18   Ht 182.9 cm (72\")   Wt 89.4 kg (197 lb 2 oz)   SpO2 92%   BMI 26.73 kg/m²      Intake/Output Summary (Last 24 hours) at 12/11/2018 1631  Last data filed at 12/11/2018 1555  Gross per 24 hour   Intake 460 ml   Output --   Net 460 ml       PE:  General Appearance:    Chronically ill appearing, alert, cooperative, NAD   HEENT:    NC/AT, without obvious abnormality, EOMI, anicteric    Neck:   supple, trachea midline, no JVD   Lungs:     CTAB without w/r/r    Heart:    RRR, normal S1 and S2, no M/R/G   Abdomen:     Soft, NT, ND, hypoactive BS    Extremities:   Moves all extremities, no edema   Pulses:   Pulses palpable and equal bilaterally   Skin:   Warm, dry   Neurologic:   A/Ox3, cooperative "   Psych:   Calm, appropriate, slightly withdrawn         Labs:   Results from last 7 days   Lab Units  12/11/18   0435   WBC 10*3/mm3  7.97   HEMOGLOBIN g/dL  10.7*   HEMATOCRIT %  32.8*   PLATELETS 10*3/mm3  197     Results from last 7 days   Lab Units  12/11/18   0435   SODIUM mmol/L  134*   POTASSIUM mmol/L  4.0   CHLORIDE mmol/L  101   CO2 mmol/L  27.6   BUN mg/dL  28*   CREATININE mg/dL  1.27   GLUCOSE mg/dL  143*   CALCIUM mg/dL  9.2     Results from last 7 days   Lab Units  12/11/18   0435   SODIUM mmol/L  134*   POTASSIUM mmol/L  4.0   CHLORIDE mmol/L  101   CO2 mmol/L  27.6   BUN mg/dL  28*   CREATININE mg/dL  1.27   CALCIUM mg/dL  9.2   BILIRUBIN mg/dL  0.2   ALK PHOS U/L  83   ALT (SGPT) U/L  13   AST (SGOT) U/L  18   GLUCOSE mg/dL  143*     Imaging Results (last 72 hours)     Procedure Component Value Units Date/Time    CT Chest Without Contrast [[id number]] Collected:  12/10/18 0728     Updated:  12/10/18 0732    Narrative:       EXAM: CT CHEST WO CONTRAST-              CLINICAL INDICATION:R/O pneumonia. XRAY with consolidation; R50.9-Fever,  unspecified      COMPARISON: COMPARISON: 9/11/2018     TECHNIQUE: Multiple axial CT images were obtained from lung apex through  upper abdomen WITHOUT administration of IV contrast. Reformatted images  in the coronal and/or sagittal plane(s) were generated from the axial  data set to facilitate diagnostic accuracy and/or surgical planning.  Oral Contrast:NONE.     Radiation dose reduction techniques were utilized per ALARA protocol.  Automated exposure control was initiated through either or [employer] or  [employer] software packages by  protocol.    DOSE (DLP mGy-cm): 516.01 mGy.cm        FINDINGS:     LUNGS: CHRONIC APPEARING INTERSTITIAL LUNG CHANGES ARE NOTED WITH  UNDERLYING MILD CENTRILOBULAR EMPHYSEMA. SUBPLEURAL AND PERIPHERAL  FIBROSIS OF THE LOWER LUNG ZONES NOTED. NO AIRSPACE CONSOLIDATIONS OR  SUSPICIOUS PULMONARY NODULES/MASSES IDENTIFIED.      HEART: MODERATE CARDIOMEGALY WITH MODERATE CORONARY VASCULAR  CALCIFICATIONS.     PERICARDIUM: No effusion.     MEDIASTINUM: No masses. No enlarged lymph nodes.  No fluid collections.     PLEURA: No pleural effusion. No pleural mass or abnormal calcification.  No pneumothorax.     MAJOR AIRWAYS: Clear. No intrinsic mass.     VASCULATURE: No evidence of aneurysm.     VISUALIZED UPPER ABDOMEN:SINCE THE PREVIOUS EXAM, THERE HAS BEEN  INTERVAL PLACEMENT OF BILIARY STENT WHICH LIKELY ACCOUNTS FOR PRESENCE  OF PNEUMOBILIA WITHIN THE LIVER.     OTHER: None.     BONES: DEGENERATIVE CHANGES THORACIC SPINE BUT NO ACUTE BONY FINDINGS.       Impression:       1. NO SIGNIFICANT CHANGE IN THE APPEARANCE OF THE CHEST AGAIN  DEMONSTRATING CHRONIC LUNG CHANGES WITH MILD BASILAR SUBPLEURAL  FIBROSIS.  2. STABLE CARDIOMEGALY.  3. INTERVAL PLACEMENT OF BILIARY STENT WHICH ACCOUNTS FOR PNEUMOBILIA.     This report was finalized on 12/10/2018 7:30 AM by Dr. Klever Paredes MD.       XR Chest PA & Lateral [[id number]] Collected:  12/09/18 1115     Updated:  12/09/18 1118    Narrative:       EXAMINATION: XR CHEST PA AND LATERAL-      CLINICAL INDICATION: r/o pneumonia; R50.9-Fever, unspecified          COMPARISON: 12/7/2018      TECHNIQUE: XR CHEST PA AND LATERAL-      FINDINGS:   PowerPort is stable in the superior vena cava   Atelectasis in the left lung base  Equivocal pneumonia in the right infrahilar region   No pneumothorax.   Bony and soft tissue structures are unremarkable.            Impression:       Right infrahilar consolidation     This report was finalized on 12/9/2018 11:16 AM by Dr. Ernesto Bell MD.             Diagnostics: Reviewed    A: Todd Phillips is a 68 y.o. male with pancreatic ca, sepsis (recurrent) from GI source         P:   Pt reports improvement in n/v. Suggest PRN haldol at low dose of 0.5-1mg q6h PRN for nausea. Also may want to consider trial of reglan, which could also help with constipation.     Pt has  trouble with volume in Miralax at times due to nausea. Would suggest trying lactulose PRN or scheduled to help with constipation, which certainly will not help his n/v or abd pain if his constipation worsens.     No changes to pain meds suggested at this time.     Pt voiced concerns that he may not get to surgery. Dtr at bedside very tearful asking if palliative care was consulted because they don't think he'll make it to surgery now. Spent some time reviewing role as symptom management and supportive care. Did mention that it is reasonable to be considering alternatives if improving and surgery do not seem feasible in the future, but pt and dtr are not prepared to hear about prognosis from that standpoint yet.     We appreciate the consult and the opportunity to participate in Todd Phillips's care. We will continue to follow along. Please do not hesitate to contact us regarding further symptom management or goals of care needs, including after hours or on weekends via our on call provider at [phone].     Time: 75 minutes spent reviewing medical and medication records, assessing and examining patient, discussing with family, answering questions, providing some guidance about a plan and documentation of care, and coordinating care with other healthcare members, with > 50% time spent face to face.     Nneka Thomson MD    12/11/2018        Electronically signed by Nneka Thomson MD at 12/11/2018  4:39 PM          Nutrition Notes (most recent note)      Amairani Cho RD at 12/7/2018  2:07 PM        Nutrition Services    Patient Name:  Todd Phillips  YOB: 1950  MRN: [id number]  Admit Date:  12/6/2018    Consult for TPN assessment.  Recommend Clinimix (20% dextrose, 5% AA) @ 80 ml/hr, with 250 ml of 20% lipids 3 x/week.  This will provide 1690 kcals without lipids and 2190 kcals with lipids, which will meet estimated kcal/protein needs.     Note will d/c cardiac, renal diet due to poor  intakes.        Thank you    Electronically signed by:  Amairani Cho RD  18 2:07 PM     Electronically signed by Amairani Cho RD at 2018  4:36 PM          Physical Therapy Notes (most recent note)      Sully Jauregui, PTA at 2018  9:22 AM  Version 1 of 1         Acute Care - Physical Therapy Treatment Note  [facility]     Patient Name: Todd Phillips  : 1950  MRN: [id number]  Today's Date: 2018  Onset of Illness/Injury or Date of Surgery: 18  Date of Referral to PT: 18  Referring Physician: Dr. Valdez    Admit Date: 2018    Visit Dx:    ICD-10-CM ICD-9-CM   1. Fever, unspecified fever cause R50.9 780.60     Patient Active Problem List   Diagnosis   • Hyperlipidemia   • COPD (chronic obstructive pulmonary disease) (CMS/HCC)   • Essential hypertension   • Gout without tophus   • Anxiety and depression   • Primary insomnia   • Gastroesophageal reflux disease without esophagitis   • Nonobstructive atherosclerosis of coronary artery   • CKD stage 3 secondary to diabetes (CMS/HCC)   • DM2 (diabetes mellitus, type 2) (CMS/HCC)   • Paroxysmal atrial fibrillation   • Chronic anticoagulation, eliquis   • Encounter for monitoring flecainide therapy   • Malignant neoplasm of head of pancreas (CMS/HCC)   • Bacteremia due to Escherichia coli   • Biliary obstruction   • Thrombocytopenia (CMS/HCC)   • Anemia due to chemotherapy   • Fever       Therapy Treatment    Rehabilitation Treatment Summary     Row Name 18 0916             Treatment Time/Intention    Discipline  physical therapy assistant  -KB      Document Type  therapy note (daily note)  -KB      Subjective Information  no complaints  -KB      Mode of Treatment  individual therapy;physical therapy  -KB      Patient/Family Observations  Pt. sidelying in bed in no apparent signs of distress upon PTA arrival; pt. agreeable to PT tx on this date.  -KB      Therapy Frequency (PT Clinical Impression)  other (see  comments) 3-5 times/ week per priority policy  -KB      Patient Effort  good  -KB      Existing Precautions/Restrictions  fall  -KB      Patient Response to Treatment  Pt. tolerated tx well with no adverse effects noted. Increased gait distance on this date. Pt. and whife educated on HEP and importance of compliance. Pt. given red and green theraband as well as written HEP.  -KB      Recorded by [KB] Sully Jauregui, [facility] 12/13/18 0922      Row Name 12/13/18 0916             Cognitive Assessment/Intervention- PT/OT    Orientation Status (Cognition)  oriented x 3  -KB      Follows Commands (Cognition)  follows one step commands;WFL  -KB      Recorded by [KB] Sully Jauregui, [facility] 12/13/18 0922      Row Name 12/13/18 0916             Safety Issues, Functional Mobility    Safety Issues Affecting Function (Mobility)  awareness of need for assistance;insight into deficits/self awareness;safety precaution awareness;safety precautions follow-through/compliance  -KB      Impairments Affecting Function (Mobility)  balance;coordination;endurance/activity tolerance;pain;strength  -KB      Recorded by [KB] Sully Jauregui, PTA 12/13/18 0922      Row Name 12/13/18 09             Mobility Assessment/Intervention    Extremity Weight-bearing Status  left lower extremity;right lower extremity  -KB      Left Lower Extremity (Weight-bearing Status)  weight-bearing as tolerated (WBAT)  -KB      Right Lower Extremity (Weight-bearing Status)  weight-bearing as tolerated (WBAT)  -KB      Recorded by [KB] Sully Jauregui, PTA 12/13/18 0922      Row Name 12/13/18 0916             Bed Mobility Assessment/Treatment    Bed Mobility Assessment/Treatment  bed mobility (all) activities  -KB      [place] Level (Bed Mobility)  standby assist;verbal cues;nonverbal cues (demo/gesture)  -KB      Assistive Device (Bed Mobility)  bed rails  -KB      Recorded by [KB] Sully Jauregui, PTA 12/13/18 0922      Row Name 12/13/18 0916             Transfer  Assessment/Treatment    Transfer Assessment/Treatment  sit-stand transfer;stand-sit transfer  -KB      Maintains Weight-bearing Status (Transfers)  able to maintain  -KB      Recorded by [KB] Sully Jauregui, [facility] 12/13/18 0922      Row Name 12/13/18 0916             Sit-Stand Transfer    Sit-Stand [place] (Transfers)  contact guard;nonverbal cues (demo/gesture);verbal cues  -KB      Assistive Device (Sit-Stand Transfers)  walker, front-wheeled  -KB      Recorded by [KB] Sully Jauregui, [facility] 12/13/18 0922      Row Name 12/13/18 0916             Stand-Sit Transfer    Stand-Sit [place] (Transfers)  contact guard;nonverbal cues (demo/gesture);verbal cues  -KB      Assistive Device (Stand-Sit Transfers)  walker, front-wheeled  -KB      Recorded by [KB] Sully Jauregui, [facility] 12/13/18 0922      Row Name 12/13/18 0916             Gait/Stairs Assessment/Training    25779 - Gait Training Minutes   15  -KB      Gait/Stairs Assessment/Training  gait/ambulation independence;gait/ambulation assistive device;distance ambulated;gait pattern;gait deviations;maintains weight-bearing status  -KB      [place] Level (Gait)  contact guard;verbal cues;nonverbal cues (demo/gesture)  -KB      Assistive Device (Gait)  walker, front-wheeled  -KB      Distance in Feet (Gait)  300  -KB      Pattern (Gait)  step-through  -KB      Deviations/Abnormal Patterns (Gait)  bobby decreased;gait speed decreased  -KB      Recorded by [KB] Sully Jauregui, PTA 12/13/18 0922      Row Name 12/13/18 0916             Therapeutic Exercise    Therapeutic Exercise  seated, lower extremities  -KB      Additional Documentation  Therapeutic Exercise (Row)  -KB      03976 - PT Therapeutic Exercise Minutes  15  -KB      01444 - PT Therapeutic Activity Minutes  8  -KB      Recorded by [KB] Sully Jauregui, PTA 12/13/18 0922      Row Name 12/13/18 0916             Lower Extremity Seated Therapeutic Exercise    Performed, Seated Lower Extremity  (Therapeutic Exercise)  LAQ (long arc quad), knee extension;hip flexion/extension;hip abduction/adduction;knee flexion/extension  -KB      Device, Seated Lower Extremity (Therapeutic Exercise)  other (see comments);elastic bands/tubing pillow  -KB      Exercise Type, Seated Lower Extremity (Therapeutic Exercise)  AROM (active range of motion)  -KB      Expected Outcomes, Seated Lower Extremity (Therapeutic Exercise)  improve functional stability  -KB      Sets/Reps Detail, Seated Lower Extremity (Therapeutic Exercise)  1x10  -KB      Transfers Skills, Training to Functional Activity, Seated Lower Extremity (Therapeutic Exercise)  able to transfer skills from training to functional activity  -KB      Recorded by [KB] Sully Jauregui, [facility] 12/13/18 0922      Row Name 12/13/18 0916             Positioning and Restraints    Pre-Treatment Position  in bed  -KB      Post Treatment Position  chair  -KB      In Chair  sitting;call light within reach;encouraged to call for assist;with family/caregiver  -KB      Recorded by [KB] Sully Jauregui, [facility] 12/13/18 0922      Row Name 12/13/18 0916             Sensory Assessment/Intervention    Sensory General Assessment  no sensation deficits identified  -KB      Recorded by [KB] Sully Jauregui, PTA 12/13/18 0922      Row Name 12/13/18 0916             Vision Assessment/Intervention    Visual Impairment/Limitations  WFL  -KB      Recorded by [KB] Sully Jauregui, [facility] 12/13/18 0922      Row Name 12/13/18 0916             Coping    Observed Emotional State  accepting;calm;cooperative;flat  -KB      Verbalized Emotional State  acceptance  -KB      Recorded by [KB] Sully Jauregui, PTA 12/13/18 0922      Row Name 12/13/18 0916             Outcome Summary/Treatment Plan (PT)    Daily Summary of Progress (PT)  progress toward functional goals as expected  -GONZALES      Plan for Continued Treatment (PT)  Cont. per POC.  -KB      Anticipated Equipment Needs at Discharge (PT)  -- TBD  -KB       Anticipated Discharge Disposition (PT)  home with assist;home with home health  -GONZALES      Recorded by [GONZALES] Sully Jauregui, PTA 12/13/18 0922        User Key  (r) = Recorded By, (t) = Taken By, (c) = Cosigned By    Initials Name Effective Dates Discipline    Sully Moreno, PTA 12/20/17 -  PT                   Physical Therapy Education     Title: PT OT SLP Therapies (Done)     Topic: Physical Therapy (Done)     Point: Mobility training (Done)     Learning Progress Summary           Patient Acceptance, E,TB, VU by KB at 12/13/2018  9:15 AM    Acceptance, E,TB, VU by KB at 12/12/2018  2:12 PM    Acceptance, E,TB, VU,NR by KB at 12/11/2018 11:22 AM    Acceptance, E,D, VU,NR by AG at 12/10/2018  5:52 PM    Acceptance, E,TB, VU by ML at 12/8/2018  9:43 AM    Acceptance, E,TB, VU by AM at 12/7/2018  9:53 AM   Family Acceptance, E,TB, VU by KB at 12/13/2018  9:15 AM    Acceptance, E,TB, VU by KB at 12/12/2018  2:12 PM    Acceptance, E,D, VU,NR by AG at 12/10/2018  5:52 PM                   Point: Home exercise program (Done)     Learning Progress Summary           Patient Acceptance, E,TB, VU by KB at 12/13/2018  9:15 AM    Acceptance, E,TB, VU by KB at 12/12/2018  2:12 PM    Acceptance, E,TB, VU,NR by KB at 12/11/2018 11:22 AM    Acceptance, E,D, VU,NR by AG at 12/10/2018  5:52 PM    Acceptance, E,TB, VU by ML at 12/8/2018  9:43 AM    Acceptance, E,TB, VU by AM at 12/7/2018  9:53 AM   Family Acceptance, E,TB, VU by KB at 12/13/2018  9:15 AM    Acceptance, E,TB, VU by KB at 12/12/2018  2:12 PM    Acceptance, E,D, VU,NR by AG at 12/10/2018  5:52 PM                   Point: Body mechanics (Done)     Learning Progress Summary           Patient Acceptance, E,TB, VU by KB at 12/13/2018  9:15 AM    Acceptance, E,TB, VU by KB at 12/12/2018  2:12 PM    Acceptance, E,TB, VU,NR by KB at 12/11/2018 11:22 AM    Acceptance, E,D, VU,NR by AG at 12/10/2018  5:52 PM    Acceptance, E,TB, VU by ML at 12/8/2018  9:43 AM    Acceptance,  E,TB, VU by AM at 12/7/2018  9:53 AM   Family Acceptance, E,TB, VU by KB at 12/13/2018  9:15 AM    Acceptance, E,TB, VU by KB at 12/12/2018  2:12 PM    Acceptance, E,D, VU,NR by AG at 12/10/2018  5:52 PM                   Point: Precautions (Done)     Learning Progress Summary           Patient Acceptance, E,TB, VU by KB at 12/13/2018  9:15 AM    Acceptance, E,TB, VU by KB at 12/12/2018  2:12 PM    Acceptance, E,TB, VU,NR by KB at 12/11/2018 11:22 AM    Acceptance, E,D, VU,NR by AG at 12/10/2018  5:52 PM    Acceptance, E,TB, VU by ML at 12/8/2018  9:43 AM    Acceptance, E,TB, VU by AM at 12/7/2018  9:53 AM   Family Acceptance, E,TB, VU by  at 12/13/2018  9:15 AM    Acceptance, E,TB, VU by KB at 12/12/2018  2:12 PM    Acceptance, E,D, VU,NR by AG at 12/10/2018  5:52 PM                               User Key     Initials Effective Dates Name Provider Type Discipline     04/03/18 -  Aletha Metcalf, PT Physical Therapist PT     12/20/17 -  Sully Jauregui PTA Physical Therapy Assistant PT     06/11/18 -  Aletha Hargrove, RN Registered Nurse Nurse     07/25/18 -  Mable Cooper RN Registered Nurse Nurse                PT Recommendation and Plan  Anticipated Discharge Disposition (PT): home with assist, home with home health  Therapy Frequency (PT Clinical Impression): other (see comments)(3-5 times/ week per priority policy)  Outcome Summary/Treatment Plan (PT)  Daily Summary of Progress (PT): progress toward functional goals as expected  Plan for Continued Treatment (PT): Cont. per POC.  Anticipated Equipment Needs at Discharge (PT): (TBD)  Anticipated Discharge Disposition (PT): home with assist, home with home health  Plan of Care Reviewed With: patient  Progress: improving  Outcome Summary: Pt. performed all therapeutic activities as prescribed and required adequate rest breaks. Cont. per POC.  Outcome Measures     Row Name 12/13/18 0900 12/12/18 1400 12/11/18 1100       How much help from another person do  you currently need...    Turning from your back to your side while in flat bed without using bedrails?  3  -KB  3  -KB  3  -KB    Moving from lying on back to sitting on the side of a flat bed without bedrails?  3  -KB  3  -KB  3  -KB    Moving to and from a bed to a chair (including a wheelchair)?  3  -KB  3  -KB  3  -KB    Standing up from a chair using your arms (e.g., wheelchair, bedside chair)?  3  -KB  3  -KB  3  -KB    Climbing 3-5 steps with a railing?  2  -KB  2  -KB  2  -KB    To walk in hospital room?  3  -KB  3  -KB  3  -KB    AM-PAC 6 Clicks Score  17  -KB  17  -KB  17  -KB       Functional Assessment    Outcome Measure Options  AM-PAC 6 Clicks Basic Mobility (PT)  -KB  AM-PAC 6 Clicks Basic Mobility (PT)  -KB  AM-PAC 6 Clicks Basic Mobility (PT)  -KB    Row Name 12/10/18 1700 12/10/18 5719          How much help from another person do you currently need...    Turning from your back to your side while in flat bed without using bedrails?  3  -AG  --     Moving from lying on back to sitting on the side of a flat bed without bedrails?  3  -AG  --     Moving to and from a bed to a chair (including a wheelchair)?  3  -AG  --     Standing up from a chair using your arms (e.g., wheelchair, bedside chair)?  3  -AG  --     Climbing 3-5 steps with a railing?  2  -AG  --     To walk in hospital room?  3  -AG  --     AM-PAC 6 Clicks Score  17  -AG  --        How much help from another is currently needed...    Putting on and taking off regular lower body clothing?  --  2  -KH     Bathing (including washing, rinsing, and drying)  --  2  -KH     Toileting (which includes using toilet bed pan or urinal)  --  2  -KH     Putting on and taking off regular upper body clothing  --  3  -KH     Taking care of personal grooming (such as brushing teeth)  --  3  -KH     Eating meals  --  3  -KH     Score  --  15  -KH        Functional Assessment    Outcome Measure Options  AM-PAC 6 Clicks Basic Mobility (PT)  -AG  AM-PAC 6  Clicks Daily Activity (OT)  -ARSALAN       User Key  (r) = Recorded By, (t) = Taken By, (c) = Cosigned By    Initials Name Provider Type    Aletha Moran, PT Physical Therapist    Sully Moreno, EDWARD Physical Therapy Assistant    Tabatha Cantor, OT Occupational Therapist         Time Calculation:   PT Charges     Row Name 12/13/18 0916 12/13/18 0914          Time Calculation    PT Received On  --  12/13/18  -GONZALES     PT - Next Appointment  --  12/14/18  -GONZALES     PT Goal Re-Cert Due Date  --  12/24/18  -KB        Time Calculation- PT    Total Timed Code Minutes- PT  --  38 minute(s)  -KB        Timed Charges    31562 - PT Therapeutic Exercise Minutes  15  -KB  --     38439 - Gait Training Minutes   15  -KB  --     49065 - PT Therapeutic Activity Minutes  8  -KB  --       User Key  (r) = Recorded By, (t) = Taken By, (c) = Cosigned By    Initials Name Provider Type    Sully Moreno PTA Physical Therapy Assistant        Therapy Suggested Charges     Code   Minutes Charges    05859 (CPT®) Hc Pt Neuromusc Re Education Ea 15 Min      85228 (CPT®) Hc Pt Ther Proc Ea 15 Min 15 1    34158 (CPT®) Hc Gait Training Ea 15 Min 15 1    93479 (CPT®) Hc Pt Therapeutic Act Ea 15 Min 8 1    21713 (CPT®) Hc Pt Manual Therapy Ea 15 Min      00089 (CPT®) Hc Pt Iontophoresis Ea 15 Min      27628 (CPT®) Hc Pt Elec Stim Ea-Per 15 Min      19207 (CPT®) Hc Pt Ultrasound Ea 15 Min      04283 (CPT®) Hc Pt Self Care/Mgmt/Train Ea 15 Min      94628 (CPT®) Hc Pt Prosthetic (S) Train Initial Encounter, Each 15 Min      31403 (CPT®) Hc Pt Orthotic(S)/Prosthetic(S) Encounter, Each 15 Min      75094 (CPT®) Hc Orthotic(S) Mgmt/Train Initial Encounter, Each 15min      Total  38 3        Therapy Charges for Today     Code Description Service Date Service Provider Modifiers Qty    67506830448 HC GAIT TRAINING EA 15 MIN 12/12/2018 Sully Jauregui, PTA GP 1    43425050711 HC PT THER PROC EA 15 MIN 12/12/2018 Sully Jauregui PTA GP 1     46001831322 HC PT THER SUPP EA 15 MIN 2018 Sully Jauregui, PTA GP 2    13416267976 HC GAIT TRAINING EA 15 MIN 2018 Sully Jauregui, PTA GP 1    17910705355 HC PT THER PROC EA 15 MIN 2018 Sully Jauregui, PTA GP 1    70520213470 HC PT THERAPEUTIC ACT EA 15 MIN 2018 Sully Jauregui, PTA GP 1    13378240136 HC PT THER SUPP EA 15 MIN 2018 Sully Jauregui, PTA GP 2          PT G-Codes  Outcome Measure Options: AM-PAC 6 Clicks Basic Mobility (PT)  AM-PAC 6 Clicks Score: 17  Score: 15  Functional Limitation: Mobility: Walking and moving around  Mobility: Walking and Moving Around Current Status (): At least 40 percent but less than 60 percent impaired, limited or restricted  Mobility: Walking and Moving Around Goal Status (): At least 20 percent but less than 40 percent impaired, limited or restricted    Sully Jauregui PTA  2018         Electronically signed by Sully Jauregui PTA at 2018  9:23 AM          Occupational Therapy Notes (most recent note)      Tabatha Myles, OT at 2018  2:41 PM          Acute Care - Occupational Therapy Treatment Note  [facility]     Patient Name: Todd Phillips  : 1950  MRN: [id number]  Today's Date: 2018  Onset of Illness/Injury or Date of Surgery: 18     Referring Physician: Dr. Valdez    Admit Date: 2018       ICD-10-CM ICD-9-CM   1. Fever, unspecified fever cause R50.9 780.60     Patient Active Problem List   Diagnosis   • Hyperlipidemia   • COPD (chronic obstructive pulmonary disease) (CMS/HCC)   • Essential hypertension   • Gout without tophus   • Anxiety and depression   • Primary insomnia   • Gastroesophageal reflux disease without esophagitis   • Nonobstructive atherosclerosis of coronary artery   • CKD stage 3 secondary to diabetes (CMS/[facility])   • DM2 (diabetes mellitus, type 2) (CMS/HCC)   • Paroxysmal atrial fibrillation   • Chronic anticoagulation, eliquis   • Encounter for monitoring  flecainide therapy   • Malignant neoplasm of head of pancreas (CMS/HCC)   • Bacteremia due to Escherichia coli   • Biliary obstruction   • Thrombocytopenia (CMS/HCC)   • Anemia due to chemotherapy   • Fever     Past Medical History:   Diagnosis Date   • Antral gastritis    • Asthma    • Atrial fibrillation (CMS/HCC)     treated with oral blood thinner   • Chest pain    • COPD (chronic obstructive pulmonary disease) (CMS/HCC)    • Coronary artery disease     no stents   • Diabetes mellitus (CMS/HCC)     type 2    • Duodenitis    • Elevated cholesterol    • Emphysema of lung (CMS/HCC)    • Gallbladder disease     removed    • GERD (gastroesophageal reflux disease)    • Hyperlipidemia    • Hypertension    • Jaundice    • Kidney stone    • Kidney stones     had lithotripsy and passed 36 kidney stones as well as had one surgically removed.   • Malignant neoplasm of head of pancreas (CMS/HCC) 9/5/2018   • Palpitations    • Pneumonia 08/2018   • Reflux esophagitis    • Renal failure     stage 3 kidney disease   • Sepsis (CMS/HCC)      Past Surgical History:   Procedure Laterality Date   • BILE DUCT STENT PLACEMENT      [facility] 2 weeks ago    • CARDIAC CATHETERIZATION  11/01/1999   • CARDIOVASCULAR STRESS TEST  09/2012   • COLONOSCOPY     • CYSTOSCOPY BLADDER STONE LITHOTRIPSY     • ECHO - CONVERTED  09/2012   • KIDNEY STONE SURGERY     • KIDNEY STONE SURGERY      open abdominal surgery   • UPPER GASTROINTESTINAL ENDOSCOPY  08/30/2012       Therapy Treatment    Rehabilitation Treatment Summary     Row Name 12/12/18 1436 12/12/18 1414          Treatment Time/Intention    Discipline  occupational therapist  -ARSALAN  physical therapy assistant  -GONZALES     Document Type  therapy note (daily note)  -ARSALAN  therapy note (daily note)  -GONZALES     Subjective Information  no complaints  -ARSALAN  no complaints  -GONZALES     Mode of Treatment  occupational therapy;individual therapy  -ARSALAN  individual therapy;physical therapy  -GONZALES      Patient/Family Observations  Pt sitting up in chair with wife in room; agreeable to OT treatment  -  Pt. sitting in chair in no apparent signs of distress; wife present. Both agreeable to PT tx on this date.  -KB     Therapy Frequency (PT Clinical Impression)  --  other (see comments) 3-5 times/ week per priority policy  -KB     Patient Effort  good  -KH  good  -KB     Existing Precautions/Restrictions  fall  -KH  fall  -KB     Patient Response to Treatment  Pt tolerated OT treatment well this date w/ rest as needed  -ARSALAN  Pt. tolerated tx well with no adverse effects noted; gait distance increased/  -KB     Recorded by [KH] Tabatha Myles, OT 12/12/18 1439 [KB] Sully Jauregui, [facility] 12/12/18 1417     Row Name 12/12/18 1436 12/12/18 1414          Cognitive Assessment/Intervention- PT/OT    Orientation Status (Cognition)  oriented x 3  -KH  oriented x 3  -KB     Follows Commands (Cognition)  follows one step commands;WFL  -KH  follows one step commands;WFL  -KB     Recorded by [KH] Tabatha Myles, OT 12/12/18 1439 [KB] Sully Jauregui, PTA 12/12/18 1417     Row Name 12/12/18 1414             Safety Issues, Functional Mobility    Safety Issues Affecting Function (Mobility)  awareness of need for assistance;safety precaution awareness;safety precautions follow-through/compliance  -KB      Impairments Affecting Function (Mobility)  balance;coordination;endurance/activity tolerance;pain;strength  -KB      Recorded by [KB] Sully Jauregui, PTA 12/12/18 1417      Row Name 12/12/18 1414             Mobility Assessment/Intervention    Extremity Weight-bearing Status  left lower extremity;right lower extremity  -KB      Left Lower Extremity (Weight-bearing Status)  weight-bearing as tolerated (WBAT)  -KB      Right Lower Extremity (Weight-bearing Status)  weight-bearing as tolerated (WBAT)  -KB      Recorded by [KB] Sully Jauregui, PTA 12/12/18 1417      Row Name 12/12/18 1414             Bed Mobility  Assessment/Treatment    Comment (Bed Mobility)  Not observed on this date.  -KB      Recorded by [KB] Sully Jauregui, PTA 12/12/18 1417      Row Name 12/12/18 1414             Transfer Assessment/Treatment    Transfer Assessment/Treatment  sit-stand transfer;stand-sit transfer  -KB      Maintains Weight-bearing Status (Transfers)  able to maintain  -KB      Recorded by [KB] Sully Jauregui, PTA 12/12/18 1417      Row Name 12/12/18 1414             Sit-Stand Transfer    Sit-Stand Kleberg (Transfers)  minimum assist (75% patient effort);nonverbal cues (demo/gesture);verbal cues  -KB      Assistive Device (Sit-Stand Transfers)  walker, front-wheeled  -KB      Recorded by [KB] Sully Jauregui, PTA 12/12/18 1417      Row Name 12/12/18 1414             Stand-Sit Transfer    Stand-Sit Kleberg (Transfers)  minimum assist (75% patient effort);nonverbal cues (demo/gesture);verbal cues  -KB      Assistive Device (Stand-Sit Transfers)  walker, front-wheeled  -KB      Recorded by [KB] Sully Jauregui, PTA 12/12/18 1417      Row Name 12/12/18 1414             Gait/Stairs Assessment/Training    80387 - Gait Training Minutes   15  -KB      Gait/Stairs Assessment/Training  gait/ambulation independence;gait/ambulation assistive device;distance ambulated;gait pattern;gait deviations;maintains weight-bearing status  -KB      Kleberg Level (Gait)  minimum assist (75% patient effort);nonverbal cues (demo/gesture);verbal cues  -KB      Assistive Device (Gait)  walker, front-wheeled  -KB      Distance in Feet (Gait)  250  -KB      Pattern (Gait)  step-through  -KB      Deviations/Abnormal Patterns (Gait)  bobby decreased;gait speed decreased  -KB      Recorded by [KB] Sully Jauregui, PTA 12/12/18 1417      Row Name 12/12/18 1436             Motor Skills Assessment/Interventions    Additional Documentation  Therapeutic Exercise (Group)  -KH      Recorded by [KH] Tabatha Myles, OT 12/12/18 1439      Row Name  12/12/18 1436 12/12/18 1414          Therapeutic Exercise    Therapeutic Exercise  seated, upper extremities  -KH  seated, lower extremities  -KB     Additional Documentation  --  Therapeutic Exercise (Row)  -KB     98399 - PT Therapeutic Exercise Minutes  --  10  -KB     Recorded by [KH] Tabatha Myles, OT 12/12/18 1439 [KB] Sully Jauregui, PTA 12/12/18 1417     Row Name 12/12/18 1436             Upper Extremity Seated Therapeutic Exercise    Performed, Seated Upper Extremity (Therapeutic Exercise)  shoulder flexion/extension;scapular protraction/retraction;elbow flexion/extension;wrist flexion/extension;digit flexion/extension  -      Device, Seated Upper Extremity (Therapeutic Exercise)  elastic bands/tubing weighted dowel  -      Exercise Type, Seated Upper Extremity (Therapeutic Exercise)  AROM (active range of motion) BUE GMC, BUE TherEx/Act  -      Expected Outcomes, Seated Upper Extremity (Therapeutic Exercise)  improve functional tolerance, self-care activity;improve performance, BADLs  -      Sets/Reps Detail, Seated Upper Extremity (Therapeutic Exercise)  5 BUE strengthening/endurance exercises x 20 reps each  -KH      Recorded by [KH] Tabatha Myles, LORENA 12/12/18 1439      Row Name 12/12/18 1414             Lower Extremity Seated Therapeutic Exercise    Performed, Seated Lower Extremity (Therapeutic Exercise)  LAQ (long arc quad), knee extension;ankle dorsiflexion/plantarflexion;hip abduction/adduction;hip flexion/extension  -KB      Device, Seated Lower Extremity (Therapeutic Exercise)  other (see comments) pillow  -KB      Exercise Type, Seated Lower Extremity (Therapeutic Exercise)  AROM (active range of motion)  -KB      Expected Outcomes, Seated Lower Extremity (Therapeutic Exercise)  improve functional stability  -KB      Sets/Reps Detail, Seated Lower Extremity (Therapeutic Exercise)  1x10  -KB      Transfers Skills, Training to Functional Activity, Seated Lower  Extremity (Therapeutic Exercise)  transfers skills to functional activity most of the time  -KB      Recorded by [KB] Sully Jauregui, PTA 12/12/18 1417      Row Name 12/12/18 1436 12/12/18 1414          Positioning and Restraints    Pre-Treatment Position  sitting in chair/recliner  -KH  sitting in chair/recliner  -KB     Post Treatment Position  chair  -KH  chair  -KB     In Chair  sitting;call light within reach;encouraged to call for assist;with family/caregiver  -KH  sitting;call light within reach;encouraged to call for assist;with family/caregiver  -KB     Recorded by [KH] Tabatha Myles, OT 12/12/18 1439 [KB] Sully Jauregui, PTA 12/12/18 1417     Row Name 12/12/18 1414             Sensory Assessment/Intervention    Sensory General Assessment  no sensation deficits identified  -KB      Recorded by [KB] Sully Jauregui, PTA 12/12/18 1417      Row Name 12/12/18 1414             Vision Assessment/Intervention    Visual Impairment/Limitations  WFL  -KB      Recorded by [KB] Sully Jauregui, PTA 12/12/18 1417      Row Name 12/12/18 1414             Coping    Observed Emotional State  accepting;calm;cooperative;flat  -KB      Verbalized Emotional State  acceptance  -KB      Recorded by [KB] Sully Jauregui, PTA 12/12/18 1417      Row Name 12/12/18 1436             Plan of Care Review    Plan of Care Reviewed With  patient  -KH      Recorded by [KH] Tabatha Myles, OT 12/12/18 1439      Row Name 12/12/18 1414             Outcome Summary/Treatment Plan (PT)    Daily Summary of Progress (PT)  progress toward functional goals as expected  -KB      Plan for Continued Treatment (PT)  Cont. per POC.  -KB      Anticipated Equipment Needs at Discharge (PT)  -- TBD  -KB      Anticipated Discharge Disposition (PT)  home with assist;home with home health  -KB      Recorded by [KB] Sully Jauregui, PTA 12/12/18 1417        User Key  (r) = Recorded By, (t) = Taken By, (c) = Cosigned By    Initials Name  Effective Dates Discipline     Sully Jauregui, PTA 12/20/17 -  PT    Tabatha Cantor, OT 04/17/18 -  OT             Occupational Therapy Education     Title: PT OT SLP Therapies (Done)     Topic: Occupational Therapy (Done)     Point: ADL training (Done)     Description: Instruct learner(s) on proper safety adaptation and remediation techniques during self care or transfers.   Instruct in proper use of assistive devices.    Learning Progress Summary           Patient Acceptance, E,TB, VU by ML at 12/8/2018  9:43 AM    Acceptance, E,TB, VU by AM at 12/7/2018  9:53 AM                   Point: Home exercise program (Done)     Description: Instruct learner(s) on appropriate technique for monitoring, assisting and/or progressing therapeutic exercises/activities.    Learning Progress Summary           Patient Acceptance, E,TB, VU by ML at 12/8/2018  9:43 AM    Acceptance, E,TB, VU by AM at 12/7/2018  9:53 AM                   Point: Precautions (Done)     Description: Instruct learner(s) on prescribed precautions during self-care and functional transfers.    Learning Progress Summary           Patient Acceptance, E,TB, VU by ML at 12/8/2018  9:43 AM    Acceptance, E,TB, VU by AM at 12/7/2018  9:53 AM                   Point: Body mechanics (Done)     Description: Instruct learner(s) on proper positioning and spine alignment during self-care, functional mobility activities and/or exercises.    Learning Progress Summary           Patient Acceptance, E,TB, VU by ML at 12/8/2018  9:43 AM    Acceptance, E,TB, VU by AM at 12/7/2018  9:53 AM                               User Key     Initials Effective Dates Name Provider Type Discipline    AM 06/11/18 -  Aletha Hargrove, RN Registered Nurse Nurse     07/25/18 -  Mable Cooper, ROHIT Registered Nurse Nurse                OT Recommendation and Plan  Outcome Summary/Treatment Plan (OT)  Anticipated Equipment Needs at Discharge (OT): (TBD)  Planned Therapy Interventions  (OT Eval): activity tolerance training, adaptive equipment training, BADL retraining, functional balance retraining, occupation/activity based interventions, patient/caregiver education/training, ROM/therapeutic exercise, strengthening exercise, transfer/mobility retraining  Therapy Frequency (OT Eval): 3 times/wk(3-5 x per week)  Plan of Care Review  Plan of Care Reviewed With: patient  Plan of Care Reviewed With: patient  Outcome Summary: Pt tolerated OT treatment well this date w/ rest as needed. Completed 5 BUE strengthening/endurance exercises x 20 reps each. Skilled OT to continue current POC.   Outcome Measures     Row Name 12/12/18 1400 12/11/18 1100 12/10/18 1700       How much help from another person do you currently need...    Turning from your back to your side while in flat bed without using bedrails?  3  -KB  3  -KB  3  -AG    Moving from lying on back to sitting on the side of a flat bed without bedrails?  3  -KB  3  -KB  3  -AG    Moving to and from a bed to a chair (including a wheelchair)?  3  -KB  3  -KB  3  -AG    Standing up from a chair using your arms (e.g., wheelchair, bedside chair)?  3  -KB  3  -KB  3  -AG    Climbing 3-5 steps with a railing?  2  -KB  2  -KB  2  -AG    To walk in hospital room?  3  -KB  3  -KB  3  -AG    AM-PAC 6 Clicks Score  17  -KB  17  -KB  17  -AG       Functional Assessment    Outcome Measure Options  AM-PAC 6 Clicks Basic Mobility (PT)  -KB  AM-PAC 6 Clicks Basic Mobility (PT)  -KB  AM-PAC 6 Clicks Basic Mobility (PT)  -AG    Row Name 12/10/18 1519             How much help from another is currently needed...    Putting on and taking off regular lower body clothing?  2  -KH      Bathing (including washing, rinsing, and drying)  2  -KH      Toileting (which includes using toilet bed pan or urinal)  2  -KH      Putting on and taking off regular upper body clothing  3  -KH      Taking care of personal grooming (such as brushing teeth)  3  -KH      Eating meals  3  -KH       Score  15  -KH         Functional Assessment    Outcome Measure Options  AM-PAC 6 Clicks Daily Activity (OT)  -        User Key  (r) = Recorded By, (t) = Taken By, (c) = Cosigned By    Initials Name Provider Type    Aletha Moran, PT Physical Therapist    Sully Moreno, PTA Physical Therapy Assistant    Tabatha Cantor, OT Occupational Therapist           Time Calculation:   Time Calculation- OT     Row Name 12/12/18 1440 12/12/18 1414          Time Calculation- OT    Total Timed Code Minutes- OT  25 minute(s)  -  --        Timed Charges    87937 - Gait Training Minutes   --  15  -KB     39910 - OT Therapeutic Activity Minutes  25  -KH  --       User Key  (r) = Recorded By, (t) = Taken By, (c) = Cosigned By    Initials Name Provider Type    Sully Moreno, PTA Physical Therapy Assistant    Tabatha Cantor, OT Occupational Therapist           Therapy Suggested Charges     Code   Minutes Charges    77091 (CPT®) Hc Ot Neuromusc Re Education Ea 15 Min      62203 (CPT®) Hc Ot Ther Proc Ea 15 Min      46344 (CPT®) Hc Ot Therapeutic Act Ea 15 Min 25 2    23451 (CPT®) Hc Ot Manual Therapy Ea 15 Min      78201 (CPT®) Hc Ot Iontophoresis Ea 15 Min      46007 (CPT®) Hc Ot Elec Stim Ea-Per 15 Min      82796 (CPT®) Hc Ot Ultrasound Ea 15 Min      10405 (CPT®) Hc Ot Self Care/Mgmt/Train Ea 15 Min      Total  25 2        Therapy Charges for Today     Code Description Service Date Service Provider Modifiers Qty    81529714821 HC OT THERAPEUTIC ACT EA 15 MIN 12/11/2018 Tabatha Myles, OT GO 1    26315765679 HC OT THERAPEUTIC ACT EA 15 MIN 12/12/2018 Tabatha Myles OT GO 2          OT G-codes  OT Professional Judgement Used?: Yes  OT Functional Scales Options: AM-PAC 6 Clicks Daily Activity (OT)  Functional Assessment Tool Used: FIM  Functional Limitation: Self care  Self Care Current Status (): At least 40 percent but less than 60 percent impaired, limited  or restricted  Self Care Goal Status (): At least 20 percent but less than 40 percent impaired, limited or restricted    Tabatha Myles, LORENA  12/12/2018    Electronically signed by Tabatha Myles OT at 12/12/2018  2:41 PM       Speech Language Pathology Notes (most recent note)     No notes of this type exist for this encounter.        Respiratory Therapy Notes (most recent note)     No notes of this type exist for this encounter.        ADL Documentation (most recent)      Most Recent Value   Presence of Pain  complains of pain/discomfort   Transferring  2 - assistive person   Toileting  3 - assistive equipment and person   Bathing  2 - assistive person   Dressing  2 - assistive person   Eating  0 - independent   Communication  0 - understands/communicates without difficulty   Swallowing  0 - swallows foods/liquids without difficulty   Equipment Currently Used at Home  walker, rolling, shower chair               Discharge Summary      Aaron Rao MD at 12/13/2018 11:36 AM        Date of admission: 12/6/18  Date of discharge: 12/13/18    Principal diagnosis: Severe sepsis present on admission, source unknown possibly biliary stent  Secondary diagnosis:  Pancreatic cancer  Functional decline and weakness  Chronic pain associated with pancreatic cancer  History of paroxysmal atrial fibrillation chronically anticoagulated  Type 2 diabetes and do not feel you should this time  Gastroesophageal reflux disease, with his abdominal symptoms are increased his PPI to twice a day  Chronic kidney disease stage III  COPD by history without exacerbation this admission  Mild hyponatremia that is stable on discharge    Consultants:  Oncology   Palliative care  Infectious disease Dr. Faye    Procedures:  CT chest without contrast  CT head without contrast    Disposition: Home with home health and continued on TPN    Exam: Assisted byMague RN  Patient states he's had some nausea this morning but  tolerated some food he is on TPN he is ambulated and did well with this and otherwise continues to feel better.  Vital signs: 116/81, 18, 82, 98.6  Overall he appears to feel better pupils equal round sclerae nonicteric skin warm and dry mood is good lungs bilateral breath sounds are clear today without rhonchi rales or wheezing, heart regular rate and rhythm without murmur or gallop, saturation is good on room air, abdomen is soft and nontender today nondistended bowel sounds are active.  Patient's Port-A-Cath right chest looks good    Hospital course: Patient was admitted with severe sepsis, workup has been unrevealing except he has had sepsis in the past from a biliary stent.  He was treated with broad-spectrum antibiotics as guided by infectious disease and although cultures remain negative I feel like the source of this infection was most likely biliary in nature.  Patient has clinically improved his infection has been treated.  Because the patient has an ongoing stent and we are bridging him until he can get his Whipple done I am treating him with an additional 10 days of Omnicef and Flagyl at home.  He has follow-up with the surgeon in one week.  Patient initially was not tolerant of any food and was having increased pain in his abdomen.  With treatment the infectious process the pain in his abdomen has improved and his appetite is increasing as well.  He has been receiving Zofran and Phenergan while here and he will be discharged home on this as well.  This has helped his nausea.  I did check the QTC this morning and it is adequate on the Zofran.  Since patient is improving and his strength quite dramatically he will be followed up with his surgeon next Wednesday in anticipation of scheduling for his Whipple surgery.  I did check a CA-19-9, this continues to come down and now is at 1089.  Patient has history of paroxysmal atrial fibrillation and is maintained on Tambocor and he is maintaining sinus rhythm  here, he is chronically on Eliquis for stroke prevention.  Hemoglobin is overall stable.  I have reminded them to make sure and discussed with the surgeon about the Eliquis to see timing of stopping this preoperatively.  His condition on discharge is stable and improved.  See chart for full details of this visit.    Follow-up:  -Dr. Serafin Hale next Wednesday as scheduled  -Home health, I've asked that home health in 4 days check a CMP CBC magnesium and phosphorus considering the patient is going to be on TPN    Activity: As tolerated    Nutrition/diet, as tolerated with TPN as previously ordered    Medications:  Albuterol home dose when necessary  Allopurinol 100 mg daily  Eliquis 5 mg twice a day  Omnicef twice a day  Vitamin D3 1000 units daily  Colchicine 0.6 mg daily  Marinol 1 capsule twice a day before meals  Flecainide 50 mg twice a day  Prozac 20 mg a day  Linzess 145 µg every morning before breakfast  Ativan 0.5 mg every 4-6 hours  Flagyl 500 mg every 12 hours  Asmanex 2 puffs every night  Singulair 10 mg a day  MS Contin 30 mg twice a day  Sublingual nitroglycerin when necessary  Zofran 4 mg every 8 hours as needed  Oxycodone 5 mg every 4 hours as needed for breakthrough pain   MiraLAX 17 g daily  Promethazine 12.5 mg every 6 hours as needed  Stiolto respimat 2 puffs daily    11:05-11:57AM discharge    Electronically signed by Aaron Adam MD at 12/13/2018 11:52 AM       Discharge Order (From admission, onward)    Start     Ordered    12/13/18 1118  Discharge patient  Once     Expected Discharge Date:  12/13/18    Discharge Disposition:  Home or Self Care    Physician of Record for Attribution - Please select from Treatment Team:  AARON ADAM [[id number]]    Review needed by CMO to determine Physician of Record:  No       Question Answer Comment   Physician of Record for Attribution - Please select from Treatment Team AARON ADAM    Review needed by CMO to determine Physician of Record No         12/13/18 1135

## 2018-12-13 NOTE — PROGRESS NOTES
PROGRESS NOTE         Patient Identification:  Name:  Todd Phillips  Age:  68 y.o.  Sex:  male  :  1950  MRN:  [id number]  Visit Number:  [id number]  Primary Care Provider:  Adiel Denney MD      ----------------------------------------------------------------------------------------------------------------------  Subjective       Chief Complaints:    Chest Pain and Fever    Interval History:      Patient doing well this morning. Seen sitting in chair with wife at side. Patient admitted to one episode of vomiting overnight. Denies any nausea, fever, or diarrhea. CRP and WBC remain normal.     Review of Systems:    Constitutional: no fever, chills and night sweats. No appetite change or unexpected weight change. No fatigue.  Eyes: no eye drainage, itching or redness.  HEENT: no mouth sores, dysphagia or nose bleed.  Respiratory: no for shortness of breath, cough or production of sputum.  Cardiovascular: no chest pain, no palpitations, no orthopnea.  Gastrointestinal: No nausea, no vomiting or diarrhea. No abdominal pain, no hematemesis or rectal bleeding.  Genitourinary: no dysuria or polyuria.  Hematologic/lymphatic: no lymph node abnormalities, no easy bruising or easy bleeding.  Musculoskeletal: no muscle or joint pain.  Skin: No rash and no itching.  Neurological: no loss of consciousness, no seizure, no headache.  Behavioral/Psych: no depression or suicidal ideation.  Endocrine: no hot flashes.  Immunologic: negative.    ----------------------------------------------------------------------------------------------------------------------      Objective       Current Hospital Meds:      No current facility-administered medications for this encounter.   ----------------------------------------------------------------------------------------------------------------------    Vital Signs:  Temp:  [98.3 °F (36.8 °C)-98.6 °F (37 °C)] 98.6 °F (37 °C)  Heart Rate:  [80-85] 82  Resp:  [18-20]  18  BP: (107-131)/(72-86) 116/81  No data found.  SpO2 Percentage    12/12/18 1926 12/13/18 0810 12/13/18 0819   SpO2: 92% 96% 98%     SpO2:  [92 %-98 %] 98 %  on   ;   Device (Oxygen Therapy): room air    Body mass index is 26.95 kg/m².  Wt Readings from Last 3 Encounters:   12/13/18 90.1 kg (198 lb 11.2 oz)   12/04/18 88.4 kg (194 lb 12.8 oz)   11/27/18 90.7 kg (200 lb)        Intake/Output Summary (Last 24 hours) at 12/13/2018 1618  Last data filed at 12/13/2018 0819  Gross per 24 hour   Intake 710 ml   Output --   Net 710 ml     No diet orders on file  ----------------------------------------------------------------------------------------------------------------------    Physical exam:      Constitutional:  Well-developed and well-nourished.  No respiratory distress.      HENT:  Head: Normocephalic and atraumatic.  Mouth:  Moist mucous membranes.    Eyes:  Conjunctivae and EOM are normal.  No scleral icterus.  Neck:  Neck supple.  No JVD present.    Cardiovascular:  Normal rate, regular rhythm and normal heart sounds with no murmur. No edema.  Pulmonary/Chest:  No respiratory distress, no wheezes, no crackles, with normal breath sounds and good air movement.  Abdominal:  Soft.  Bowel sounds are normal.  No distension.No tenderness.  Musculoskeletal:  No edema, no tenderness, and no deformity.  No swelling or redness of joints.  Neurological:  Alert and oriented to person, place, and time.  No facial droop.  No slurred speech.   Skin:  Skin is warm and dry.  No rash noted.  No pallor.   Psychiatric:  Normal mood and affect.  Behavior is normal.    ----------------------------------------------------------------------------------------------------------------------  I have personally reviewed the EKG/Telemetry strips   ----------------------------------------------------------------------------------------------------------------------  Results from last 7 days   Lab Units  12/06/18   7649   TROPONIN I ng/mL   0.007       Results from last 7 days   Lab Units  12/07/18   0705   TRIGLYCERIDES mg/dL  127     Results from last 7 days   Lab Units  12/06/18   2319   PH, ARTERIAL pH units  7.530*   PO2 ART mm Hg  74.8*   PCO2, ARTERIAL mm Hg  29.7*   HCO3 ART mmol/L  24.3     Results from last 7 days   Lab Units  12/13/18   0510  12/12/18   0445  12/11/18   0435   12/08/18   0816   12/07/18   1141  12/07/18   0705  12/06/18   2305   CRP mg/dL  <0.50  <0.50  0.53   < >   --    --    --   3.15*  2.00*   LACTATE mmol/L   --    --    --    --   2.0   --   2.1*  2.5*  1.3   WBC 10*3/mm3  8.28  7.35  7.97   < >   --    < >   --   10.68  10.72   HEMOGLOBIN g/dL  10.7*  11.0*  10.7*   < >   --    < >   --   11.3*  11.3*   HEMATOCRIT %  32.7*  34.4*  32.8*   < >   --    < >   --   34.6*  33.9*   MCV fL  90.3  92.5  90.1   < >   --    < >   --   91.3  89.4   MCHC g/dL  32.7*  32.0*  32.6*   < >   --    < >   --   32.7*  33.3   PLATELETS 10*3/mm3  164  189  197   < >   --    < >   --   282  323   INR    --    --    --    --    --    --    --    --   1.56*    < > = values in this interval not displayed.     Results from last 7 days   Lab Units  12/13/18   0510  12/12/18   0445  12/11/18   0435   12/09/18   0412   SODIUM mmol/L  133*  133*  134*   < >  135   POTASSIUM mmol/L  4.2  4.1  4.0   < >  4.8   MAGNESIUM mg/dL   --   2.1  1.8   --   2.0   CHLORIDE mmol/L  102  104  101   < >  103   CO2 mmol/L  23.2*  23.5*  27.6   < >  23.3*   BUN mg/dL  30*  25*  28*   < >  33*   CREATININE mg/dL  1.28  1.16  1.27   < >  1.25   EGFR IF NONAFRICN AM mL/min/1.73  56*  62  56*   < >  57*   CALCIUM mg/dL  8.7  9.1  9.2   < >  8.8   GLUCOSE mg/dL  145*  137*  143*   < >  127*   ALBUMIN g/dL   --   3.50  3.60   --   3.20*   BILIRUBIN mg/dL   --   0.4  0.2   --   0.2   ALK PHOS U/L   --   85  83   --   77   AST (SGOT) U/L   --   21  18   --   22   ALT (SGPT) U/L   --   15  13   --   10    < > = values in this interval not displayed.   Estimated Creatinine  Clearance: 68.4 mL/min (by C-G formula based on SCr of 1.28 mg/dL).  No results found for: AMMONIA    Glucose   Date/Time Value Ref Range Status   12/13/2018 1123 140 (H) 70 - 130 mg/dL Final   12/13/2018 0708 195 (H) 70 - 130 mg/dL Final   12/12/2018 2122 139 (H) 70 - 130 mg/dL Final   12/12/2018 1632 149 (H) 70 - 130 mg/dL Final   12/12/2018 1125 137 (H) 70 - 130 mg/dL Final   12/12/2018 0647 151 (H) 70 - 130 mg/dL Final   12/11/2018 2218 161 (H) 70 - 130 mg/dL Final   12/11/2018 1631 144 (H) 70 - 130 mg/dL Final     Lab Results   Component Value Date    HGBA1C 7.00 (H) 12/08/2018     Lab Results   Component Value Date    TSH 1.502 11/18/2018    FREET4 1.14 10/30/2018       Blood Culture   Date Value Ref Range Status   12/06/2018 No growth at 3 days  Preliminary   12/06/2018 No growth at 3 days  Preliminary     Urine Culture   Date Value Ref Range Status   12/06/2018 No growth  Final              Pain Management Panel     Pain Management Panel Latest Ref Rng & Units 10/16/2018 9/12/2018    CREATININE UR mg/dL 208.8 -    AMPHETAMINES SCREEN, URINE Negative - Negative    BARBITURATES SCREEN Negative - Negative    BENZODIAZEPINE SCREEN, URINE Negative - Negative    BUPRENORPHINE Negative - Negative    COCAINE SCREEN, URINE Negative - Negative    METHADONE SCREEN, URINE Negative - Negative          I have personally reviewed the above laboratory results.   ----------------------------------------------------------------------------------------------------------------------  Imaging Results (last 24 hours)     ** No results found for the last 24 hours. **        I have personally reviewed the above radiology results.   ----------------------------------------------------------------------------------------------------------------------    Assessment/Plan       Assessment/Plan     ASSESSMENT:    1. Severe sepsis with lactic acid greater than 2 on admission  2. Fever of unknown origin  3.  Immunosupression    PLAN:    Patient doing well this morning. Seen sitting in chair with wife at side. Patient admitted to one episode of vomiting overnight. Denies any nausea, fever, or diarrhea. CRP and WBC remain normal.      Patient had a prior episode of bacteremia and is at very high risk for relapsing bacteremia.    At any time patient can be discharged. We recommend to continue current regimen until discharge with no antibiotics needed up on discharge due to patient's immunosuppression. CRP in AM.     Current Antimicrobials:  Vancomycin pharmacy to dose  Cefepime 2gm IV Q12H  Micafungin 100mg IV Q24H        Code Status:   Code Status and Medical Interventions:   Ordered at: 12/07/18 0635     Code Status:    CPR     Medical Interventions (Level of Support Prior to Arrest):    Full       Fish Zepeda PA-C  12/13/18  4:18 PM

## 2018-12-13 NOTE — PROGRESS NOTES
Discharge Planning Assessment  QUINCY Jorgensen     Patient Name: Todd Phillips  MRN: [id number]  Today's Date: 12/13/2018    Admit Date: 12/6/2018      Discharge Plan     Row Name 12/13/18 1158       Plan    Final Discharge Disposition Code  06 - home with home health care    Final Note  Pt is being discharged home today. Pt utilizes [facility]. SS contacted [facility] [phone] per Yanni. SS faxed information including order and AVS to [facility] [phone]. Nurse to call report to [facility]. Pt utilizes [facility] for TPN. SS notified [facility] per Pretty [phone] of pt's discharge today. SS faxed order per Dr. Rao to continue TPN as previously ordered. No other needs identified.         Home Medical Care - Selection Complete      Service Provider Request Status Selected Services Address Phone Number Fax Number    [facility] [address] [place] [id number] [phone] --     Mercedes Castellanos

## 2018-12-13 NOTE — DISCHARGE PLACEMENT REQUEST
"Jose Ya (68 y.o. Male)     Date of Birth Social Security Number Address Home Phone MRN    1950  [id number]  [place] [address] [phone] [id number]    [employer] Status          Other        Admission Date Admission Type Admitting Provider Attending Provider Department, Room/Bed    12/6/18 Emergency Cindy Valdez MD Heinss, Karl F, MD [address], [address]/    Discharge Date Discharge Disposition Discharge Destination         Home or Self Care              Attending Provider:  Aaron Rao MD    Allergies:  Contrast Dye    Isolation:  None   Infection:  None   Code Status:  CPR    Ht:  182.9 cm (72\")   Wt:  90.1 kg (198 lb 11.2 oz)    Admission Cmt:  None   Principal Problem:  None                Active Insurance as of 12/6/2018     Primary Coverage     Payor Plan Insurance Group Employer/Plan Group    MEDICARE MEDICARE A & B      Payor Plan Address Payor Plan Phone Number Payor Plan Fax Number Effective Dates    [address] [phone]  4/1/2005 - None Entered    [facility] [id number]       Subscriber Name Subscriber Birth Date Member ID       JOSE YA 1950 [id number]           Secondary Coverage     Payor Plan Insurance Group Employer/Plan Group    [employer] - [id number]     Payor Plan Address Payor Plan Phone Number Payor Plan Fax Number Effective Dates    [address]   6/16/2016 - None Entered    [facility] [id number]       Subscriber Name Subscriber Birth Date Member ID       JOSE YA 1950 [id number]                 Emergency Contacts      (Rel.) Home Phone Work Phone Mobile Phone    Emmy Ya (Spouse) [phone] -- [phone]    Carlos Ya (Son) -- -- [phone]    Richard Ya (Son) -- -- [phone]        Aaron Rao MD   Physician   Medicine   Discharge Summary   Signed   Date of Service:  12/13/2018 11:36 AM               Signed                  Show:Clear " all  [x]Manual[]Template[]Copied    Added by:  [x]Aaron Rao MD      []Carmen for details      Date of admission: 12/6/18  Date of discharge: 12/13/18     Principal diagnosis: Severe sepsis present on admission, source unknown possibly biliary stent  Secondary diagnosis:  Pancreatic cancer  Functional decline and weakness  Chronic pain associated with pancreatic cancer  History of paroxysmal atrial fibrillation chronically anticoagulated  Type 2 diabetes and do not feel you should this time  Gastroesophageal reflux disease, with his abdominal symptoms are increased his PPI to twice a day  Chronic kidney disease stage III  COPD by history without exacerbation this admission  Mild hyponatremia that is stable on discharge     Consultants:  Oncology   Palliative care  Infectious disease Dr. Faye     Procedures:  CT chest without contrast  CT head without contrast     Disposition: Home with home health and continued on TPN     Exam: Assisted byMague RN  Patient states he's had some nausea this morning but tolerated some food he is on TPN he is ambulated and did well with this and otherwise continues to feel better.  Vital signs: 116/81, 18, 82, 98.6  Overall he appears to feel better pupils equal round sclerae nonicteric skin warm and dry mood is good lungs bilateral breath sounds are clear today without rhonchi rales or wheezing, heart regular rate and rhythm without murmur or gallop, saturation is good on room air, abdomen is soft and nontender today nondistended bowel sounds are active.  Patient's Port-A-Cath right chest looks good     Hospital course: Patient was admitted with severe sepsis, workup has been unrevealing except he has had sepsis in the past from a biliary stent.  He was treated with broad-spectrum antibiotics as guided by infectious disease and although cultures remain negative I feel like the source of this infection was most likely biliary in nature.  Patient has clinically  improved his infection has been treated.  Because the patient has an ongoing stent and we are bridging him until he can get his Whipple done I am treating him with an additional 10 days of Omnicef and Flagyl at home.  He has follow-up with the surgeon in one week.  Patient initially was not tolerant of any food and was having increased pain in his abdomen.  With treatment the infectious process the pain in his abdomen has improved and his appetite is increasing as well.  He has been receiving Zofran and Phenergan while here and he will be discharged home on this as well.  This has helped his nausea.  I did check the QTC this morning and it is adequate on the Zofran.  Since patient is improving and his strength quite dramatically he will be followed up with his surgeon next Wednesday in anticipation of scheduling for his Whipple surgery.  I did check a CA-19-9, this continues to come down and now is at 1089.  Patient has history of paroxysmal atrial fibrillation and is maintained on Tambocor and he is maintaining sinus rhythm here, he is chronically on Eliquis for stroke prevention.  Hemoglobin is overall stable.  I have reminded them to make sure and discussed with the surgeon about the Eliquis to see timing of stopping this preoperatively.  His condition on discharge is stable and improved.  See chart for full details of this visit.     Follow-up:  -Dr. Serafin Hale next Wednesday as scheduled  -Home health, I've asked that home health in 4 days check a CMP CBC magnesium and phosphorus considering the patient is going to be on TPN     Activity: As tolerated     Nutrition/diet, as tolerated with TPN as previously ordered     Medications:  Albuterol home dose when necessary  Allopurinol 100 mg daily  Eliquis 5 mg twice a day  Omnicef twice a day  Vitamin D3 1000 units daily  Colchicine 0.6 mg daily  Marinol 1 capsule twice a day before meals  Flecainide 50 mg twice a day  Prozac 20 mg a day  Linzess 145 µg every  morning before breakfast  Ativan 0.5 mg every 4-6 hours  Flagyl 500 mg every 12 hours  Asmanex 2 puffs every night  Singulair 10 mg a day  MS Contin 30 mg twice a day  Sublingual nitroglycerin when necessary  Zofran 4 mg every 8 hours as needed  Oxycodone 5 mg every 4 hours as needed for breakthrough pain   MiraLAX 17 g daily  Promethazine 12.5 mg every 6 hours as needed  Stiolto respimat 2 puffs daily     11:05-11:57AM discharge                Routing History

## 2018-12-13 NOTE — DISCHARGE PLACEMENT REQUEST
"Jose Ya (68 y.o. Male)     Date of Birth Social Security Number Address Home Phone MRN    1950  [id number]  [place] [address] [phone] [id number]    Episcopalian Marital Status          Other        Admission Date Admission Type Admitting Provider Attending Provider Department, Room/Bed    12/6/18 Emergency Cindy Valdez MD Heinss, Karl F, MD [address], 3334/1P    Discharge Date Discharge Disposition Discharge Destination         Home or Self Care              Attending Provider:  Aaron Roa MD    Allergies:  Contrast Dye    Isolation:  None   Infection:  None   Code Status:  CPR    Ht:  182.9 cm (72\")   Wt:  90.1 kg (198 lb 11.2 oz)    Admission Cmt:  None   Principal Problem:  None                Active Insurance as of 12/6/2018     Primary Coverage     Payor Plan Insurance Group Employer/Plan Group    MEDICARE MEDICARE A & B      Payor Plan Address Payor Plan Phone Number Payor Plan Fax Number Effective Dates    [address] [phone]  4/1/2005 - None Entered    [facility] [id number]       Subscriber Name Subscriber Birth Date Member ID       JOSE YA 1950 [id number]           Secondary Coverage     Payor Plan Insurance Group Employer/Plan Group    [employer] - ASA [id number]     Payor Plan Address Payor Plan Phone Number Payor Plan Fax Number Effective Dates    [address]   6/16/2016 - None Entered    [facility] [id number]       Subscriber Name Subscriber Birth Date Member ID       JOSE YA 1950 [id number]                 Emergency Contacts      (Rel.) Home Phone Work Phone Mobile Phone    Emmy Ya (Spouse) [phone] -- [phone]    Carlos Ya (Son) -- -- [phone]    Richard Ya (Son) -- -- [phone]        After Visit Summary     Jose Ya MRN: [id number]      12/7/2018 - 12/13/2018     [address]    Your Next Steps     Do     ·    these medications " from any pharmacy with your printed prescription   ? cefdinir   ? lansoprazole   ? metroNIDAZOLE   ? ondansetron   ? promethazine   Go     Dec 18 INFUSION 1:00 PM   [facility] OUTPATIENT INFUSION   [address]   [place] [address]   [phone]    You have more appointments on this date. Please review your full appointment list.   [email or web address]     View your After Visit Summary and more online at [email or web address]   After Visit Summary   Instructions     ·   Your medications have changed     START taking:   ? cefdinir (OMNICEF)   ? metroNIDAZOLE (FLAGYL)   ? ondansetron (ZOFRAN)   ? promethazine (PHENERGAN)   CHANGE how you take:   ? lansoprazole (PREVACID)   STOP taking:   ? granisetron 3.1 MG/24HR (SANCUSO)   ? pioglitazone 30 MG tablet (ACTOS)   ? prochlorperazine 10 MG tablet (COMPAZINE)   Review your updated medication list below.   Your Next Steps   Do   Go       If you have any questions about your recovery, please call the [facility] Nurse Call Center at [phone]. A registered nurse is available 24 hours a day 7 days a week to assist you.   ·   Diet instructions     Diet: Regular   Discharge Diet:   Regular   ·   Other instructions     Discharge Follow-up with Specified Provider: Dr Hale as already scheduled next week in [place]   Discharge Instructions   Continue Home TPN as previously ordered   What's next     What's next          Follow up [facility] Adiel Denney MD  [address] [facility] [facility] [address]   [place] [address]   [phone]          Referral to Home Health   Home Health Services     Dec 18 INFUSION   Tuesday Dec 18, 2018 1:00 PM  [facility] OUTPATIENT INFUSION   [address] [facility]  [place] [address]   [phone]          LAB   Tuesday Dec 18, 2018 1:00 PM  [facility] H[facility]  AND ONCOLOGY   [address] [facility]  [place] [address]   [phone]    Dec 19 Follow Up with Adiel Denney MD    Wednesday Dec 19, 2018 11:00 AM   Arrive 15 minutes prior to appointment.  [facility] CARDIOLOGY   [address] [place] [address] [phone]    Feb 5 2019 Office Visit with Jefry Luna MD   Tuesday Feb 5, 2019 2:30 PM   Arrive 30 minutes prior to appointment.  If you are in need of a language or hearing  please call the Department.  [facility] GASTROENTEROLOGY   [address]  [facility] [address]   [phone]    Jun 21 2019 Follow Up with Rj Perez MD   Friday Jun 21, 2019 10:15 AM   Arrive 15 minutes prior to appointment.  [facility] CARDIOLOGY   [address]  [facility] [address]   [phone]    Your Allergies   Date Reviewed: 12/7/2018   Your Allergies   Allergen Reactions   Contrast Dye Other (See Comments)   Can't have due to kidney failure per family   Patient Belongings Returned     Document Return of Belongings Flowsheet     Were the patient bedside belongings sent home? --   Medications Retrieved from Pharmacy & Sent Home --   Belongings Sent to Safe --   Belongings sent with: --   Belongings Retrieved from Security & Sent Home --       Labels That Talkhart Signup     Our records indicate that you have an active [employer] account.     You can view your After Visit Summary by going to Thrasos and logging in with your Inn[employer]s username and password.  If you don't have a Innometrics username and password but a parent or guardian has access to your record, the parent or guardian should login with their own Innometrics username and password and access your record to view the After Visit Summary.     If you have questions, you can email [email or web address] or call [phone] or toll free number [phone] to talk to our Inn[employer]s staff.  Remember, Innometrics is NOT to be used for urgent needs.  For medical emergencies, dial 911.     Medication List   Medication List      Morning Afternoon Evening Bedtime As Needed    * albuterol (2.5 MG/3ML) 0.083% nebulizer solution   Commonly known as: PROVENTIL   Take 2.5 mg by nebulization 2 (Two) Times a Day As Needed for Wheezing or Shortness of Air.          * albuterol 108 (90 Base) MCG/ACT inhaler   Commonly known as: PROVENTIL HFA;VENTOLIN HFA;PROAIR HFA   Inhale 2 puffs Every 4 (Four) Hours As Needed for Wheezing or Shortness of Air.          allopurinol 100 MG tablet   Commonly known as: ZYLOPRIM   Take 100 mg by mouth Daily.          apixaban 5 MG tablet tablet   Commonly known as: ELIQUIS   Take 1 tablet by mouth Every 12 (Twelve) Hours.          cefdinir 300 MG capsule   Commonly known as: OMNICEF   Take 1 capsule by mouth Every 12 (Twelve) Hours for 11 doses.   For: Infection Within the Abdomen          cholecalciferol 1000 units tablet   Commonly known as: VITAMIN D3   Take 1,000 Units by mouth Daily.          colchicine 0.6 MG tablet   Take 1 tablet by mouth Daily.          dronabinol 5 MG capsule   Commonly known as: MARINOL   Take 1 capsule by mouth 2 (Two) Times a Day Before Meals.          flecainide 50 MG tablet   Commonly known as: TAMBOCOR   Take 1 tablet by mouth Every 12 (Twelve) Hours.          FLUoxetine 20 MG capsule   Commonly known as: PROzac   Take 20 mg by mouth 2 (Two) Times a Day.          lansoprazole 30 MG capsule   Commonly known as: PREVACID   Take 1 capsule by mouth Daily With Breakfast & Dinner.   What changed: when to take this          linaclotide 145 MCG capsule capsule   Commonly known as: LINZESS   Take 1 capsule by mouth Every Morning Before Breakfast.          LORazepam 0.5 MG tablet   Commonly known as: ATIVAN   Take one to two tablets by mouth every 4 to 6 hours for nausea.   What changed:   · how much to take   · how to take this   · when to take this   · reasons to take this   · additional instructions   According to our records, you may have been taking this medication differently.           metroNIDAZOLE 500 MG tablet   Commonly known as: FLAGYL   Take 1 tablet by mouth Every 12 (Twelve) Hours for 11 doses.   For: Infection Within the Abdomen          mometasone 220 MCG/INH inhaler   Commonly known as: ASMANEX   Inhale 2 puffs Every Night.          montelukast 10 MG tablet   Commonly known as: SINGULAIR   Take 10 mg by mouth Daily.          Morphine 30 MG 12 hr tablet   Commonly known as: MS CONTIN   Take 1 tablet by mouth Every 12 (Twelve) Hours.          nitroglycerin 0.4 MG SL tablet   Commonly known as: NITROSTAT   Place 0.4 mg under the tongue as needed for chest pain.          ondansetron 4 MG tablet   Commonly known as: ZOFRAN   Take 1 tablet by mouth Every 8 (Eight) Hours As Needed for Nausea or Vomiting.          oxyCODONE 5 MG immediate release tablet   Commonly known as: ROXICODONE   Take 5 mg by mouth Every 4 (Four) Hours As Needed for Moderate Pain .          polyethylene glycol packet   Commonly known as: MIRALAX   Take 17 g by mouth Daily As Needed.          promethazine 12.5 MG tablet   Commonly known as: PHENERGAN   Take 1 tablet by mouth Every 6 (Six) Hours As Needed for Nausea or Vomiting.          tiotropium bromide-olodaterol 2.5-2.5 MCG/ACT aerosol solution inhaler   Commonly known as: STIOLTO RESPIMAT   Inhale 2 puffs Daily.         Very important * This list has 2 medication(s) that are the same as other medications prescribed for you. Read the directions carefully, and ask your doctor or other care provider to review them with you.   Where to  your medications     ·    these medications from any pharmacy with your printed prescription     ? cefdinir • lansoprazole • metroNIDAZOLE • ondansetron • promethazine

## 2018-12-13 NOTE — THERAPY TREATMENT NOTE
Acute Care - Occupational Therapy Treatment Note   Jameel     Patient Name: Todd Phillips  : 1950  MRN: [id number]  Today's Date: 2018  Onset of Illness/Injury or Date of Surgery: 18     Referring Physician: Dr. Valdez    Admit Date: 2018       ICD-10-CM ICD-9-CM   1. Fever, unspecified fever cause R50.9 780.60   2. Malignant neoplasm of pancreas, unspecified location of malignancy (CMS/HCC) C25.9 157.9   3. Weakness R53.1 780.79     Patient Active Problem List   Diagnosis   • Hyperlipidemia   • COPD (chronic obstructive pulmonary disease) (CMS/HCC)   • Essential hypertension   • Gout without tophus   • Anxiety and depression   • Primary insomnia   • Gastroesophageal reflux disease without esophagitis   • Nonobstructive atherosclerosis of coronary artery   • CKD stage 3 secondary to diabetes (CMS/HCC)   • DM2 (diabetes mellitus, type 2) (CMS/[facility])   • Paroxysmal atrial fibrillation   • Chronic anticoagulation, eliquis   • Encounter for monitoring flecainide therapy   • Malignant neoplasm of head of pancreas (CMS/HCC)   • Bacteremia due to Escherichia coli   • Biliary obstruction   • Thrombocytopenia (CMS/HCC)   • Anemia due to chemotherapy   • Fever     Past Medical History:   Diagnosis Date   • Antral gastritis    • Asthma    • Atrial fibrillation (CMS/HCC)     treated with oral blood thinner   • Chest pain    • COPD (chronic obstructive pulmonary disease) (CMS/[facility])    • Coronary artery disease     no stents   • Diabetes mellitus (CMS/HCC)     type 2    • Duodenitis    • Elevated cholesterol    • Emphysema of lung (CMS/HCC)    • Gallbladder disease     removed    • GERD (gastroesophageal reflux disease)    • Hyperlipidemia    • Hypertension    • Jaundice    • Kidney stone    • Kidney stones     had lithotripsy and passed 36 kidney stones as well as had one surgically removed.   • Malignant neoplasm of head of pancreas (CMS/HCC) 2018   • Palpitations    • Pneumonia 2018   • Reflux  esophagitis    • Renal failure     stage 3 kidney disease   • Sepsis (CMS/HCC)      Past Surgical History:   Procedure Laterality Date   • BILE DUCT STENT PLACEMENT      [facility] 2 weeks ago    • CARDIAC CATHETERIZATION  11/01/1999   • CARDIOVASCULAR STRESS TEST  09/2012   • COLONOSCOPY     • CYSTOSCOPY BLADDER STONE LITHOTRIPSY     • ECHO - CONVERTED  09/2012   • KIDNEY STONE SURGERY     • KIDNEY STONE SURGERY      open abdominal surgery   • UPPER GASTROINTESTINAL ENDOSCOPY  08/30/2012       Therapy Treatment    Rehabilitation Treatment Summary     Row Name 12/13/18 1440 12/13/18 0916          Treatment Time/Intention    Discipline  --  physical therapy assistant  -KB     Document Type  therapy note (daily note)  -  therapy note (daily note)  -KB     Subjective Information  no complaints  -  no complaints  -     Mode of Treatment  occupational therapy;individual therapy  -  individual therapy;physical therapy  -KB     Patient/Family Observations  Pt sitting up in chair and agreeable to OT treatment  -  Pt. sidelying in bed in no apparent signs of distress upon PTA arrival; pt. agreeable to PT tx on this date.  -KB     Therapy Frequency (PT Clinical Impression)  --  other (see comments) 3-5 times/ week per priority policy  -KB     Patient Effort  good  -  good  -KB     Comment  Nursing and pt agreeable to AM OT treatment  -  --     Existing Precautions/Restrictions  fall  -  fall  -KB     Patient Response to Treatment  Pt tolerated OT treatment well this date w/ rest as needed; Pt and wife also given and educated on BUE home exercises program at this time  -  Pt. tolerated tx well with no adverse effects noted. Increased gait distance on this date. Pt. and whife educated on HEP and importance of compliance. Pt. given red and green theraband as well as written HEP.  -KB     Recorded by [KH] Tabatha Myles, OT 12/13/18 1442 [KB] Sully Jauregui, PTA 12/13/18 0922     Row  Name 12/13/18 1440 12/13/18 0916          Cognitive Assessment/Intervention- PT/OT    Orientation Status (Cognition)  oriented x 3  -KH  oriented x 3  -KB     Follows Commands (Cognition)  follows one step commands;WFL  -KH  follows one step commands;WFL  -KB     Recorded by [KH] Tabatha Myles, OT 12/13/18 1442 [KB] Sully Jauregui, [facility] 12/13/18 0922     Row Name 12/13/18 0916             Safety Issues, Functional Mobility    Safety Issues Affecting Function (Mobility)  awareness of need for assistance;insight into deficits/self awareness;safety precaution awareness;safety precautions follow-through/compliance  -KB      Impairments Affecting Function (Mobility)  balance;coordination;endurance/activity tolerance;pain;strength  -KB      Recorded by [KB] Sully Jauregui, [facility] 12/13/18 0922      Row Name 12/13/18 0916             Mobility Assessment/Intervention    Extremity Weight-bearing Status  left lower extremity;right lower extremity  -KB      Left Lower Extremity (Weight-bearing Status)  weight-bearing as tolerated (WBAT)  -KB      Right Lower Extremity (Weight-bearing Status)  weight-bearing as tolerated (WBAT)  -KB      Recorded by [KB] Sully Jauregui, [facility] 12/13/18 0922      Row Name 12/13/18 0916             Bed Mobility Assessment/Treatment    Bed Mobility Assessment/Treatment  bed mobility (all) activities  -KB      [place] Level (Bed Mobility)  standby assist;verbal cues;nonverbal cues (demo/gesture)  -KB      Assistive Device (Bed Mobility)  bed rails  -KB      Recorded by [KB] Sully Jauregui, [facility] 12/13/18 0922      Row Name 12/13/18 0916             Transfer Assessment/Treatment    Transfer Assessment/Treatment  sit-stand transfer;stand-sit transfer  -KB      Maintains Weight-bearing Status (Transfers)  able to maintain  -KB      Recorded by [KB] Sully Jauregui, [facility] 12/13/18 0922      Row Name 12/13/18 0916             Sit-Stand Transfer    Sit-Stand [place] (Transfers)  contact  guard;nonverbal cues (demo/gesture);verbal cues  -KB      Assistive Device (Sit-Stand Transfers)  walker, front-wheeled  -KB      Recorded by [KB] Sully Jauregui, [facility] 12/13/18 0922      Row Name 12/13/18 0916             Stand-Sit Transfer    Stand-Sit [place] (Transfers)  contact guard;nonverbal cues (demo/gesture);verbal cues  -KB      Assistive Device (Stand-Sit Transfers)  walker, front-wheeled  -KB      Recorded by [KB] Sully Jauregui, [facility] 12/13/18 0922      Row Name 12/13/18 0916             Gait/Stairs Assessment/Training    33408 - Gait Training Minutes   15  -KB      Gait/Stairs Assessment/Training  gait/ambulation independence;gait/ambulation assistive device;distance ambulated;gait pattern;gait deviations;maintains weight-bearing status  -KB      [place] Level (Gait)  contact guard;verbal cues;nonverbal cues (demo/gesture)  -KB      Assistive Device (Gait)  walker, front-wheeled  -KB      Distance in Feet (Gait)  300  -KB      Pattern (Gait)  step-through  -KB      Deviations/Abnormal Patterns (Gait)  bobby decreased;gait speed decreased  -KB      Recorded by [KB] Sully Jauregui, [facility] 12/13/18 0922      Row Name 12/13/18 0916             Therapeutic Exercise    Therapeutic Exercise  seated, lower extremities  -KB      Additional Documentation  Therapeutic Exercise (Row)  -KB      70631 - PT Therapeutic Exercise Minutes  15  -KB      66140 - PT Therapeutic Activity Minutes  8  -KB      Recorded by [KB] Sully Jauregui, [facility] 12/13/18 0922      Row Name 12/13/18 1440             Upper Extremity Seated Therapeutic Exercise    Performed, Seated Upper Extremity (Therapeutic Exercise)  shoulder flexion/extension;scapular protraction/retraction;elbow flexion/extension;wrist flexion/extension;digit flexion/extension  -      Device, Seated Upper Extremity (Therapeutic Exercise)  elastic bands/tubing weighted dowel  -      Exercise Type, Seated Upper Extremity (Therapeutic Exercise)  AROM (active  range of motion) BUE GMC, BUE TherEx/Act  -KH      Expected Outcomes, Seated Upper Extremity (Therapeutic Exercise)  improve functional tolerance, self-care activity;improve performance, BADLs  -      Sets/Reps Detail, Seated Upper Extremity (Therapeutic Exercise)  5 BUE strengthening/endurance exercises x 20 reps each  -KH      Recorded by [KH] Tabatha Myles, OT 12/13/18 1442      Row Name 12/13/18 0916             Lower Extremity Seated Therapeutic Exercise    Performed, Seated Lower Extremity (Therapeutic Exercise)  LAQ (long arc quad), knee extension;hip flexion/extension;hip abduction/adduction;knee flexion/extension  -KB      Device, Seated Lower Extremity (Therapeutic Exercise)  other (see comments);elastic bands/tubing pillow  -KB      Exercise Type, Seated Lower Extremity (Therapeutic Exercise)  AROM (active range of motion)  -KB      Expected Outcomes, Seated Lower Extremity (Therapeutic Exercise)  improve functional stability  -KB      Sets/Reps Detail, Seated Lower Extremity (Therapeutic Exercise)  1x10  -KB      Transfers Skills, Training to Functional Activity, Seated Lower Extremity (Therapeutic Exercise)  able to transfer skills from training to functional activity  -KB      Recorded by [KB] Sully Jauregui, PTA 12/13/18 0922      Row Name 12/13/18 1440 12/13/18 0916          Positioning and Restraints    Pre-Treatment Position  sitting in chair/recliner  -KH  in bed  -KB     Post Treatment Position  chair  -  chair  -KB     In Chair  sitting;call light within reach;encouraged to call for assist;with family/caregiver  -  sitting;call light within reach;encouraged to call for assist;with family/caregiver  -KB     Recorded by [KH] Tabatha Myles, OT 12/13/18 1442 [GONZALES] Sully Jauregui, PTA 12/13/18 0922     Row Name 12/13/18 0916             Sensory Assessment/Intervention    Sensory General Assessment  no sensation deficits identified  -KB      Recorded by [GONZALES] Shania  Sully STANFORD, PTA 12/13/18 0922      Row Name 12/13/18 0916             Vision Assessment/Intervention    Visual Impairment/Limitations  WFL  -KB      Recorded by [KB] Sully Jauregui, PTA 12/13/18 0922      Row Name 12/13/18 0916             Coping    Observed Emotional State  accepting;calm;cooperative;flat  -KB      Verbalized Emotional State  acceptance  -KB      Recorded by [KB] Sully Jauregui, PTA 12/13/18 0922      Row Name 12/13/18 0916             Outcome Summary/Treatment Plan (PT)    Daily Summary of Progress (PT)  progress toward functional goals as expected  -KB      Plan for Continued Treatment (PT)  Cont. per POC.  -KB      Anticipated Equipment Needs at Discharge (PT)  -- TBD  -KB      Anticipated Discharge Disposition (PT)  home with assist;home with home health  -KB      Recorded by [KB] Sully Jauregui, PTA 12/13/18 0922        User Key  (r) = Recorded By, (t) = Taken By, (c) = Cosigned By    Initials Name Effective Dates Discipline    KB Sully Jauregui, PTA 12/20/17 -  PT    Tabatha Cantor, OT 04/17/18 -  OT             Occupational Therapy Education     Title: PT OT SLP Therapies (Done)     Topic: Occupational Therapy (Done)     Point: ADL training (Done)     Description: Instruct learner(s) on proper safety adaptation and remediation techniques during self care or transfers.   Instruct in proper use of assistive devices.    Learning Progress Summary           Patient Acceptance, E,TB, VU by ML at 12/8/2018  9:43 AM    Acceptance, E,TB, VU by AM at 12/7/2018  9:53 AM                   Point: Home exercise program (Done)     Description: Instruct learner(s) on appropriate technique for monitoring, assisting and/or progressing therapeutic exercises/activities.    Learning Progress Summary           Patient Acceptance, E,TB, VU by ML at 12/8/2018  9:43 AM    Acceptance, E,TB, VU by AM at 12/7/2018  9:53 AM                   Point: Precautions (Done)     Description: Instruct learner(s) on  prescribed precautions during self-care and functional transfers.    Learning Progress Summary           Patient Acceptance, E,TB, VU by ML at 12/8/2018  9:43 AM    Acceptance, E,TB, VU by AM at 12/7/2018  9:53 AM                   Point: Body mechanics (Done)     Description: Instruct learner(s) on proper positioning and spine alignment during self-care, functional mobility activities and/or exercises.    Learning Progress Summary           Patient Acceptance, E,TB, VU by ML at 12/8/2018  9:43 AM    Acceptance, E,TB, VU by AM at 12/7/2018  9:53 AM                               User Key     Initials Effective Dates Name Provider Type Discipline     06/11/18 -  Aletha Hargrove, RN Registered Nurse Nurse     07/25/18 -  Mable Cooper RN Registered Nurse Nurse                OT Recommendation and Plan  Outcome Summary/Treatment Plan (OT)  Anticipated Equipment Needs at Discharge (OT): (TBD)  Planned Therapy Interventions (OT Eval): activity tolerance training, adaptive equipment training, BADL retraining, functional balance retraining, occupation/activity based interventions, patient/caregiver education/training, ROM/therapeutic exercise, strengthening exercise, transfer/mobility retraining  Therapy Frequency (OT Eval): 3 times/wk(3-5 x per week)  Plan of Care Review  Plan of Care Reviewed With: patient  Plan of Care Reviewed With: patient  Outcome Summary: Pt tolerated OT treatment well this date. Was given and educated on use of home exercises program and completed 5 BUE strengthening/endurance exercises x 20 reps each. Skilled OT to continue current POC.   Outcome Measures     Row Name 12/13/18 0900 12/12/18 1400 12/11/18 1100       How much help from another person do you currently need...    Turning from your back to your side while in flat bed without using bedrails?  3  -KB  3  -KB  3  -KB    Moving from lying on back to sitting on the side of a flat bed without bedrails?  3  -KB  3  -KB  3  -KB    Moving to  and from a bed to a chair (including a wheelchair)?  3  -KB  3  -KB  3  -KB    Standing up from a chair using your arms (e.g., wheelchair, bedside chair)?  3  -KB  3  -KB  3  -KB    Climbing 3-5 steps with a railing?  2  -KB  2  -KB  2  -KB    To walk in hospital room?  3  -KB  3  -KB  3  -KB    AM-PAC 6 Clicks Score  17  -KB  17  -KB  17  -KB       Functional Assessment    Outcome Measure Options  AM-PAC 6 Clicks Basic Mobility (PT)  -KB  AM-PAC 6 Clicks Basic Mobility (PT)  -KB  AM-PAC 6 Clicks Basic Mobility (PT)  -KB    Row Name 12/10/18 1700 12/10/18 1519          How much help from another person do you currently need...    Turning from your back to your side while in flat bed without using bedrails?  3  -AG  --     Moving from lying on back to sitting on the side of a flat bed without bedrails?  3  -AG  --     Moving to and from a bed to a chair (including a wheelchair)?  3  -AG  --     Standing up from a chair using your arms (e.g., wheelchair, bedside chair)?  3  -AG  --     Climbing 3-5 steps with a railing?  2  -AG  --     To walk in hospital room?  3  -AG  --     AM-PAC 6 Clicks Score  17  -AG  --        How much help from another is currently needed...    Putting on and taking off regular lower body clothing?  --  2  -KH     Bathing (including washing, rinsing, and drying)  --  2  -KH     Toileting (which includes using toilet bed pan or urinal)  --  2  -KH     Putting on and taking off regular upper body clothing  --  3  -KH     Taking care of personal grooming (such as brushing teeth)  --  3  -KH     Eating meals  --  3  -KH     Score  --  15  -KH        Functional Assessment    Outcome Measure Options  AM-PAC 6 Clicks Basic Mobility (PT)  -AG  AM-PAC 6 Clicks Daily Activity (OT)  -KH       User Key  (r) = Recorded By, (t) = Taken By, (c) = Cosigned By    Initials Name Provider Type    Aletha Moran, PT Physical Therapist    Sully Moreno, PTA Physical Therapy Assistant    Tabatha Cantor  Selin, OT Occupational Therapist           Time Calculation:   Time Calculation- OT     Row Name 12/13/18 1443 12/13/18 0916          Time Calculation- OT    Total Timed Code Minutes- OT  26 minute(s)  -  --        Timed Charges    67557 - Gait Training Minutes   --  15  -KB     00994 - OT Therapeutic Activity Minutes  26  -KH  --       User Key  (r) = Recorded By, (t) = Taken By, (c) = Cosigned By    Initials Name Provider Type    Sully Moreno, PTA Physical Therapy Assistant    Tabatha Cantor, OT Occupational Therapist           Therapy Suggested Charges     Code   Minutes Charges    04121 (CPT®) Hc Ot Neuromusc Re Education Ea 15 Min      88907 (CPT®) Hc Ot Ther Proc Ea 15 Min      60379 (CPT®) Hc Ot Therapeutic Act Ea 15 Min 26 2    18628 (CPT®) Hc Ot Manual Therapy Ea 15 Min      76569 (CPT®) Hc Ot Iontophoresis Ea 15 Min      05570 (CPT®) Hc Ot Elec Stim Ea-Per 15 Min      71778 (CPT®) Hc Ot Ultrasound Ea 15 Min      91779 (CPT®) Hc Ot Self Care/Mgmt/Train Ea 15 Min      Total  26 2        Therapy Charges for Today     Code Description Service Date Service Provider Modifiers Qty    79271564711 HC OT THERAPEUTIC ACT EA 15 MIN 12/12/2018 Tabatha Myles, LORENA GO 2    20431487845 HC OT THERAPEUTIC ACT EA 15 MIN 12/13/2018 Tabatha Myles, LORENA GO 2          OT G-codes  OT Professional Judgement Used?: Yes  OT Functional Scales Options: AM-PAC 6 Clicks Daily Activity (OT)  Functional Assessment Tool Used: FIM  Functional Limitation: Self care  Self Care Current Status (): At least 40 percent but less than 60 percent impaired, limited or restricted  Self Care Goal Status (): At least 20 percent but less than 40 percent impaired, limited or restricted    Tabatha Myles OT  12/13/2018

## 2018-12-13 NOTE — PLAN OF CARE
Problem: Patient Care Overview  Goal: Plan of Care Review  Outcome: Ongoing (interventions implemented as appropriate)   12/13/18 0965   Coping/Psychosocial   Plan of Care Reviewed With patient   Coping/Psychosocial   Patient Agreement with Plan of Care agrees   Plan of Care Review   Progress improving   OTHER   Outcome Summary Pt. performed all therapeutic activities as prescribed and required adequate rest breaks. Cont. per POC.

## 2018-12-13 NOTE — PLAN OF CARE
Problem: Patient Care Overview  Goal: Plan of Care Review  Outcome: Ongoing (interventions implemented as appropriate)   12/13/18 1442   Coping/Psychosocial   Plan of Care Reviewed With patient   Coping/Psychosocial   Patient Agreement with Plan of Care agrees   Plan of Care Review   Progress improving   OTHER   Outcome Summary Pt tolerated OT treatment well this date. Was given and educated on use of home exercises program and completed 5 BUE strengthening/endurance exercises x 20 reps each. Skilled OT to continue current POC.

## 2018-12-13 NOTE — DISCHARGE SUMMARY
Date of admission: 12/6/18  Date of discharge: 12/13/18    Principal diagnosis: Severe sepsis present on admission, source unknown possibly biliary stent  Secondary diagnosis:  Pancreatic cancer  Functional decline and weakness  Chronic pain associated with pancreatic cancer  History of paroxysmal atrial fibrillation chronically anticoagulated  Type 2 diabetes and do not feel you should this time  Gastroesophageal reflux disease, with his abdominal symptoms are increased his PPI to twice a day  Chronic kidney disease stage III  COPD by history without exacerbation this admission  Mild hyponatremia that is stable on discharge    Consultants:  Oncology   Palliative care  Infectious disease Dr. Faye    Procedures:  CT chest without contrast  CT head without contrast    Disposition: Home with home health and continued on TPN    Exam: Assisted byMague RN  Patient states he's had some nausea this morning but tolerated some food he is on TPN he is ambulated and did well with this and otherwise continues to feel better.  Vital signs: 116/81, 18, 82, 98.6  Overall he appears to feel better pupils equal round sclerae nonicteric skin warm and dry mood is good lungs bilateral breath sounds are clear today without rhonchi rales or wheezing, heart regular rate and rhythm without murmur or gallop, saturation is good on room air, abdomen is soft and nontender today nondistended bowel sounds are active.  Patient's Port-A-Cath right chest looks good    Hospital course: Patient was admitted with severe sepsis, workup has been unrevealing except he has had sepsis in the past from a biliary stent.  He was treated with broad-spectrum antibiotics as guided by infectious disease and although cultures remain negative I feel like the source of this infection was most likely biliary in nature.  Patient has clinically improved his infection has been treated.  Because the patient has an ongoing stent and we are bridging him  until he can get his Whipple done I am treating him with an additional 10 days of Omnicef and Flagyl at home.  He has follow-up with the surgeon in one week.  Patient initially was not tolerant of any food and was having increased pain in his abdomen.  With treatment the infectious process the pain in his abdomen has improved and his appetite is increasing as well.  He has been receiving Zofran and Phenergan while here and he will be discharged home on this as well.  This has helped his nausea.  I did check the QTC this morning and it is adequate on the Zofran.  Since patient is improving and his strength quite dramatically he will be followed up with his surgeon next Wednesday in anticipation of scheduling for his Whipple surgery.  I did check a CA-19-9, this continues to come down and now is at 1089.  Patient has history of paroxysmal atrial fibrillation and is maintained on Tambocor and he is maintaining sinus rhythm here, he is chronically on Eliquis for stroke prevention.  Hemoglobin is overall stable.  I have reminded them to make sure and discussed with the surgeon about the Eliquis to see timing of stopping this preoperatively.  His condition on discharge is stable and improved.  See chart for full details of this visit.    Follow-up:  -Dr. Serafin Hale next Wednesday as scheduled  -Home health, I've asked that home health in 4 days check a CMP CBC magnesium and phosphorus considering the patient is going to be on TPN    Activity: As tolerated    Nutrition/diet, as tolerated with TPN as previously ordered    Medications:  Albuterol home dose when necessary  Allopurinol 100 mg daily  Eliquis 5 mg twice a day  Omnicef twice a day  Vitamin D3 1000 units daily  Colchicine 0.6 mg daily  Marinol 1 capsule twice a day before meals  Flecainide 50 mg twice a day  Prozac 20 mg a day  Linzess 145 µg every morning before breakfast  Ativan 0.5 mg every 4-6 hours  Flagyl 500 mg every 12 hours  Asmanex 2 puffs every  night  Singulair 10 mg a day  MS Contin 30 mg twice a day  Sublingual nitroglycerin when necessary  Zofran 4 mg every 8 hours as needed  Oxycodone 5 mg every 4 hours as needed for breakthrough pain   MiraLAX 17 g daily  Promethazine 12.5 mg every 6 hours as needed  Stiolto respimat 2 puffs daily    11:05-11:57AM discharge

## 2018-12-13 NOTE — THERAPY TREATMENT NOTE
Acute Care - Physical Therapy Treatment Note   [place]     Patient Name: Todd Phillips  : 1950  MRN: [id number]  Today's Date: 2018  Onset of Illness/Injury or Date of Surgery: 18  Date of Referral to PT: 18  Referring Physician: Dr. Valdez    Admit Date: 2018    Visit Dx:    ICD-10-CM ICD-9-CM   1. Fever, unspecified fever cause R50.9 780.60     Patient Active Problem List   Diagnosis   • Hyperlipidemia   • COPD (chronic obstructive pulmonary disease) (CMS/HCC)   • Essential hypertension   • Gout without tophus   • Anxiety and depression   • Primary insomnia   • Gastroesophageal reflux disease without esophagitis   • Nonobstructive atherosclerosis of coronary artery   • CKD stage 3 secondary to diabetes (CMS/HCC)   • DM2 (diabetes mellitus, type 2) (CMS/HCC)   • Paroxysmal atrial fibrillation   • Chronic anticoagulation, eliquis   • Encounter for monitoring flecainide therapy   • Malignant neoplasm of head of pancreas (CMS/HCC)   • Bacteremia due to Escherichia coli   • Biliary obstruction   • Thrombocytopenia (CMS/HCC)   • Anemia due to chemotherapy   • Fever       Therapy Treatment    Rehabilitation Treatment Summary     Row Name 18 0916             Treatment Time/Intention    Discipline  physical therapy assistant  -KB      Document Type  therapy note (daily note)  -KB      Subjective Information  no complaints  -KB      Mode of Treatment  individual therapy;physical therapy  -KB      Patient/Family Observations  Pt. sidelying in bed in no apparent signs of distress upon PTA arrival; pt. agreeable to PT tx on this date.  -KB      Therapy Frequency (PT Clinical Impression)  other (see comments) 3-5 times/ week per priority policy  -KB      Patient Effort  good  -KB      Existing Precautions/Restrictions  fall  -KB      Patient Response to Treatment  Pt. tolerated tx well with no adverse effects noted. Increased gait distance on this date. Pt. and whife educated on HEP and  importance of compliance. Pt. given red and green theraband as well as written HEP.  -KB      Recorded by [KB] Sully Jauregui, PTA 12/13/18 0922      Row Name 12/13/18 0916             Cognitive Assessment/Intervention- PT/OT    Orientation Status (Cognition)  oriented x 3  -KB      Follows Commands (Cognition)  follows one step commands;WFL  -KB      Recorded by [KB] Sully Jauregui, [facility] 12/13/18 0922      Row Name 12/13/18 0916             Safety Issues, Functional Mobility    Safety Issues Affecting Function (Mobility)  awareness of need for assistance;insight into deficits/self awareness;safety precaution awareness;safety precautions follow-through/compliance  -KB      Impairments Affecting Function (Mobility)  balance;coordination;endurance/activity tolerance;pain;strength  -KB      Recorded by [KB] Sully Jauregui, [facility] 12/13/18 0922      Row Name 12/13/18 0916             Mobility Assessment/Intervention    Extremity Weight-bearing Status  left lower extremity;right lower extremity  -KB      Left Lower Extremity (Weight-bearing Status)  weight-bearing as tolerated (WBAT)  -KB      Right Lower Extremity (Weight-bearing Status)  weight-bearing as tolerated (WBAT)  -KB      Recorded by [KB] Sully Jauregui, [facility] 12/13/18 0922      Row Name 12/13/18 0916             Bed Mobility Assessment/Treatment    Bed Mobility Assessment/Treatment  bed mobility (all) activities  -KB      Benson Level (Bed Mobility)  standby assist;verbal cues;nonverbal cues (demo/gesture)  -KB      Assistive Device (Bed Mobility)  bed rails  -KB      Recorded by [KB] Sully Jauregui, PTA 12/13/18 0922      Row Name 12/13/18 0916             Transfer Assessment/Treatment    Transfer Assessment/Treatment  sit-stand transfer;stand-sit transfer  -KB      Maintains Weight-bearing Status (Transfers)  able to maintain  -KB      Recorded by [KB] Sully Jauregui, [facility] 12/13/18 0922      Row Name 12/13/18 0916             Sit-Stand Transfer     Sit-Stand Fallon (Transfers)  contact guard;nonverbal cues (demo/gesture);verbal cues  -KB      Assistive Device (Sit-Stand Transfers)  walker, front-wheeled  -KB      Recorded by [KB] Sully Jauregui, [facility] 12/13/18 0922      Row Name 12/13/18 0916             Stand-Sit Transfer    Stand-Sit Fallon (Transfers)  contact guard;nonverbal cues (demo/gesture);verbal cues  -KB      Assistive Device (Stand-Sit Transfers)  walker, front-wheeled  -KB      Recorded by [KB] Sully Jauregui, [facility] 12/13/18 0922      Row Name 12/13/18 0916             Gait/Stairs Assessment/Training    60189 - Gait Training Minutes   15  -KB      Gait/Stairs Assessment/Training  gait/ambulation independence;gait/ambulation assistive device;distance ambulated;gait pattern;gait deviations;maintains weight-bearing status  -KB      Fallon Level (Gait)  contact guard;verbal cues;nonverbal cues (demo/gesture)  -KB      Assistive Device (Gait)  walker, front-wheeled  -KB      Distance in Feet (Gait)  300  -KB      Pattern (Gait)  step-through  -KB      Deviations/Abnormal Patterns (Gait)  bobby decreased;gait speed decreased  -KB      Recorded by [KB] Sully Jauregui, [facility] 12/13/18 0922      Row Name 12/13/18 0916             Therapeutic Exercise    Therapeutic Exercise  seated, lower extremities  -KB      Additional Documentation  Therapeutic Exercise (Row)  -KB      86095 - PT Therapeutic Exercise Minutes  15  -KB      62683 - PT Therapeutic Activity Minutes  8  -KB      Recorded by [KB] Sully Jauregui, [facility] 12/13/18 0922      Row Name 12/13/18 0916             Lower Extremity Seated Therapeutic Exercise    Performed, Seated Lower Extremity (Therapeutic Exercise)  LAQ (long arc quad), knee extension;hip flexion/extension;hip abduction/adduction;knee flexion/extension  -KB      Device, Seated Lower Extremity (Therapeutic Exercise)  other (see comments);elastic bands/tubing pillow  -KB      Exercise Type, Seated Lower Extremity  (Therapeutic Exercise)  AROM (active range of motion)  -KB      Expected Outcomes, Seated Lower Extremity (Therapeutic Exercise)  improve functional stability  -KB      Sets/Reps Detail, Seated Lower Extremity (Therapeutic Exercise)  1x10  -KB      Transfers Skills, Training to Functional Activity, Seated Lower Extremity (Therapeutic Exercise)  able to transfer skills from training to functional activity  -KB      Recorded by [KB] Sully Jauregui, PTA 12/13/18 0922      Row Name 12/13/18 0916             Positioning and Restraints    Pre-Treatment Position  in bed  -KB      Post Treatment Position  chair  -KB      In Chair  sitting;call light within reach;encouraged to call for assist;with family/caregiver  -KB      Recorded by [KB] Sully Jauregui, PTA 12/13/18 0922      Row Name 12/13/18 0916             Sensory Assessment/Intervention    Sensory General Assessment  no sensation deficits identified  -KB      Recorded by [KB] Sully Jauregui, PTA 12/13/18 0922      Row Name 12/13/18 0916             Vision Assessment/Intervention    Visual Impairment/Limitations  WFL  -KB      Recorded by [KB] Sully Jauregui, PTA 12/13/18 0922      Row Name 12/13/18 0916             Coping    Observed Emotional State  accepting;calm;cooperative;flat  -KB      Verbalized Emotional State  acceptance  -KB      Recorded by [KB] Sully Jauregui, PTA 12/13/18 0922      Row Name 12/13/18 0916             Outcome Summary/Treatment Plan (PT)    Daily Summary of Progress (PT)  progress toward functional goals as expected  -GONZALES      Plan for Continued Treatment (PT)  Cont. per POC.  -KB      Anticipated Equipment Needs at Discharge (PT)  -- TBD  -KB      Anticipated Discharge Disposition (PT)  home with assist;home with home health  -KB      Recorded by [KB] Sully Jauregui, PTA 12/13/18 0922        User Key  (r) = Recorded By, (t) = Taken By, (c) = Cosigned By    Initials Name Effective Dates Discipline    Sully Moreno, PTA 12/20/17 -   PT                   Physical Therapy Education     Title: PT OT SLP Therapies (Done)     Topic: Physical Therapy (Done)     Point: Mobility training (Done)     Learning Progress Summary           Patient Acceptance, E,TB, VU by KB at 12/13/2018  9:15 AM    Acceptance, E,TB, VU by KB at 12/12/2018  2:12 PM    Acceptance, E,TB, VU,NR by KB at 12/11/2018 11:22 AM    Acceptance, E,D, VU,NR by AG at 12/10/2018  5:52 PM    Acceptance, E,TB, VU by ML at 12/8/2018  9:43 AM    Acceptance, E,TB, VU by AM at 12/7/2018  9:53 AM   Family Acceptance, E,TB, VU by KB at 12/13/2018  9:15 AM    Acceptance, E,TB, VU by KB at 12/12/2018  2:12 PM    Acceptance, E,D, VU,NR by AG at 12/10/2018  5:52 PM                   Point: Home exercise program (Done)     Learning Progress Summary           Patient Acceptance, E,TB, VU by KB at 12/13/2018  9:15 AM    Acceptance, E,TB, VU by KB at 12/12/2018  2:12 PM    Acceptance, E,TB, VU,NR by KB at 12/11/2018 11:22 AM    Acceptance, E,D, VU,NR by AG at 12/10/2018  5:52 PM    Acceptance, E,TB, VU by ML at 12/8/2018  9:43 AM    Acceptance, E,TB, VU by AM at 12/7/2018  9:53 AM   Family Acceptance, E,TB, VU by KB at 12/13/2018  9:15 AM    Acceptance, E,TB, VU by KB at 12/12/2018  2:12 PM    Acceptance, E,D, VU,NR by AG at 12/10/2018  5:52 PM                   Point: Body mechanics (Done)     Learning Progress Summary           Patient Acceptance, E,TB, VU by KB at 12/13/2018  9:15 AM    Acceptance, E,TB, VU by KB at 12/12/2018  2:12 PM    Acceptance, E,TB, VU,NR by KB at 12/11/2018 11:22 AM    Acceptance, E,D, VU,NR by AG at 12/10/2018  5:52 PM    Acceptance, E,TB, VU by ML at 12/8/2018  9:43 AM    Acceptance, E,TB, VU by AM at 12/7/2018  9:53 AM   Family Acceptance, E,TB, VU by KB at 12/13/2018  9:15 AM    Acceptance, E,TB, VU by KB at 12/12/2018  2:12 PM    Acceptance, E,D, VU,NR by AG at 12/10/2018  5:52 PM                   Point: Precautions (Done)     Learning Progress Summary           Patient  Acceptance, E,TB, VU by  at 12/13/2018  9:15 AM    Acceptance, E,TB, VU by  at 12/12/2018  2:12 PM    Acceptance, E,TB, VU,NR by  at 12/11/2018 11:22 AM    Acceptance, E,D, VU,NR by AG at 12/10/2018  5:52 PM    Acceptance, E,TB, VU by  at 12/8/2018  9:43 AM    Acceptance, E,TB, VU by AM at 12/7/2018  9:53 AM   Family Acceptance, E,TB, VU by  at 12/13/2018  9:15 AM    Acceptance, E,TB, VU by  at 12/12/2018  2:12 PM    Acceptance, E,D, VU,NR by  at 12/10/2018  5:52 PM                               User Key     Initials Effective Dates Name Provider Type Discipline     04/03/18 -  Aletha Metcalf, PT Physical Therapist PT     12/20/17 -  Sully Jauregui PTA Physical Therapy Assistant PT     06/11/18 -  Aletha Hargrove, RN Registered Nurse Nurse     07/25/18 -  Mable Cooper RN Registered Nurse Nurse                PT Recommendation and Plan  Anticipated Discharge Disposition (PT): home with assist, home with home health  Therapy Frequency (PT Clinical Impression): other (see comments)(3-5 times/ week per priority policy)  Outcome Summary/Treatment Plan (PT)  Daily Summary of Progress (PT): progress toward functional goals as expected  Plan for Continued Treatment (PT): Cont. per POC.  Anticipated Equipment Needs at Discharge (PT): (TBD)  Anticipated Discharge Disposition (PT): home with assist, home with home health  Plan of Care Reviewed With: patient  Progress: improving  Outcome Summary: Pt. performed all therapeutic activities as prescribed and required adequate rest breaks. Cont. per POC.  Outcome Measures     Row Name 12/13/18 0900 12/12/18 1400 12/11/18 1100       How much help from another person do you currently need...    Turning from your back to your side while in flat bed without using bedrails?  3  -KB  3  -KB  3  -KB    Moving from lying on back to sitting on the side of a flat bed without bedrails?  3  -KB  3  -KB  3  -KB    Moving to and from a bed to a chair (including a  wheelchair)?  3  -KB  3  -KB  3  -KB    Standing up from a chair using your arms (e.g., wheelchair, bedside chair)?  3  -KB  3  -KB  3  -KB    Climbing 3-5 steps with a railing?  2  -KB  2  -KB  2  -KB    To walk in hospital room?  3  -KB  3  -KB  3  -KB    AM-PAC 6 Clicks Score  17  -KB  17  -KB  17  -KB       Functional Assessment    Outcome Measure Options  AM-PAC 6 Clicks Basic Mobility (PT)  -KB  AM-PAC 6 Clicks Basic Mobility (PT)  -KB  AM-PAC 6 Clicks Basic Mobility (PT)  -KB    Row Name 12/10/18 1700 12/10/18 1519          How much help from another person do you currently need...    Turning from your back to your side while in flat bed without using bedrails?  3  -AG  --     Moving from lying on back to sitting on the side of a flat bed without bedrails?  3  -AG  --     Moving to and from a bed to a chair (including a wheelchair)?  3  -AG  --     Standing up from a chair using your arms (e.g., wheelchair, bedside chair)?  3  -AG  --     Climbing 3-5 steps with a railing?  2  -AG  --     To walk in hospital room?  3  -AG  --     AM-PAC 6 Clicks Score  17  -AG  --        How much help from another is currently needed...    Putting on and taking off regular lower body clothing?  --  2  -KH     Bathing (including washing, rinsing, and drying)  --  2  -KH     Toileting (which includes using toilet bed pan or urinal)  --  2  -KH     Putting on and taking off regular upper body clothing  --  3  -KH     Taking care of personal grooming (such as brushing teeth)  --  3  -KH     Eating meals  --  3  -KH     Score  --  15  -KH        Functional Assessment    Outcome Measure Options  AM-PAC 6 Clicks Basic Mobility (PT)  -AG  AM-PAC 6 Clicks Daily Activity (OT)  -KH       User Key  (r) = Recorded By, (t) = Taken By, (c) = Cosigned By    Initials Name Provider Type    Aletha Moran, PT Physical Therapist    Sully Moreno, PTA Physical Therapy Assistant    Tabatha Cantor, OT Occupational Therapist          Time Calculation:   PT Charges     Row Name 12/13/18 0916 12/13/18 0914          Time Calculation    PT Received On  --  12/13/18  -KB     PT - Next Appointment  --  12/14/18  -KB     PT Goal Re-Cert Due Date  --  12/24/18  -KB        Time Calculation- PT    Total Timed Code Minutes- PT  --  38 minute(s)  -KB        Timed Charges    49923 - PT Therapeutic Exercise Minutes  15  -KB  --     92126 - Gait Training Minutes   15  -KB  --     72514 - PT Therapeutic Activity Minutes  8  -KB  --       User Key  (r) = Recorded By, (t) = Taken By, (c) = Cosigned By    Initials Name Provider Type    Sully Moreno PTA Physical Therapy Assistant        Therapy Suggested Charges     Code   Minutes Charges    70224 (CPT®) Hc Pt Neuromusc Re Education Ea 15 Min      40877 (CPT®) Hc Pt Ther Proc Ea 15 Min 15 1    60403 (CPT®) Hc Gait Training Ea 15 Min 15 1    09829 (CPT®) Hc Pt Therapeutic Act Ea 15 Min 8 1    60367 (CPT®) Hc Pt Manual Therapy Ea 15 Min      05150 (CPT®) Hc Pt Iontophoresis Ea 15 Min      79194 (CPT®) Hc Pt Elec Stim Ea-Per 15 Min      51949 (CPT®) Hc Pt Ultrasound Ea 15 Min      12634 (CPT®) Hc Pt Self Care/Mgmt/Train Ea 15 Min      48001 (CPT®) Hc Pt Prosthetic (S) Train Initial Encounter, Each 15 Min      95716 (CPT®) Hc Pt Orthotic(S)/Prosthetic(S) Encounter, Each 15 Min      34231 (CPT®) Hc Orthotic(S) Mgmt/Train Initial Encounter, Each 15min      Total  38 3        Therapy Charges for Today     Code Description Service Date Service Provider Modifiers Qty    04441571186 HC GAIT TRAINING EA 15 MIN 12/12/2018 Sully Jauregui, PTA GP 1    81523765660 HC PT THER PROC EA 15 MIN 12/12/2018 Sully Jauregui, PTA GP 1    27078258140 HC PT THER SUPP EA 15 MIN 12/12/2018 Sully Jauregui, PTA GP 2    33785817069 HC GAIT TRAINING EA 15 MIN 12/13/2018 Sully Jauregui, PTA GP 1    22585548038 HC PT THER PROC EA 15 MIN 12/13/2018 Sully Jauregui, PTA GP 1    93833227400  PT THERAPEUTIC ACT EA 15 MIN 12/13/2018  Sully Jauregui, EDWARD GP 1    57431489526 HC PT THER SUPP EA 15 MIN 12/13/2018 Sully Jauregui PTA GP 2          PT G-Codes  Outcome Measure Options: AM-PAC 6 Clicks Basic Mobility (PT)  AM-PAC 6 Clicks Score: 17  Score: 15  Functional Limitation: Mobility: Walking and moving around  Mobility: Walking and Moving Around Current Status (): At least 40 percent but less than 60 percent impaired, limited or restricted  Mobility: Walking and Moving Around Goal Status (): At least 20 percent but less than 40 percent impaired, limited or restricted    Sully Jauregui PTA  12/13/2018

## 2018-12-13 NOTE — PLAN OF CARE
Problem: Skin Injury Risk (Adult)  Goal: Identify Related Risk Factors and Signs and Symptoms  Outcome: Ongoing (interventions implemented as appropriate)   12/07/18 0953   Skin Injury Risk (Adult)   Related Risk Factors (Skin Injury Risk) advanced age     Goal: Skin Health and Integrity  Outcome: Ongoing (interventions implemented as appropriate)   12/12/18 1853   Skin Injury Risk (Adult)   Skin Health and Integrity making progress toward outcome       Problem: Nutrition, Parenteral (Adult)  Goal: Signs and Symptoms of Listed Potential Problems Will be Absent, Minimized or Managed (Nutrition, Parenteral)  Outcome: Ongoing (interventions implemented as appropriate)   12/10/18 1334   Goal/Outcome Evaluation   Problems Assessed (Parenteral Nutrition) all   Problems Present (Parenteral Nutrition) malnutrition;infection       Problem: Cardiac Output Decreased (Adult)  Goal: Identify Related Risk Factors and Signs and Symptoms  Outcome: Ongoing (interventions implemented as appropriate)   12/09/18 0142   Cardiac Output Decreased (Adult)   Related Risk Factors (Cardiac Output Decreased) disease process   Signs and Symptoms (Cardiac Output Decreased) weakness/activity intolerance;fatigue     Goal: Effective Tissue Perfusion  Outcome: Ongoing (interventions implemented as appropriate)   12/12/18 2253   Cardiac Output Decreased (Adult)   Effective Tissue Perfusion making progress toward outcome       Problem: Fall Risk (Adult)  Goal: Identify Related Risk Factors and Signs and Symptoms  Outcome: Ongoing (interventions implemented as appropriate)   12/09/18 0142 12/12/18 0141   Fall Risk (Adult)   Related Risk Factors (Fall Risk) age-related changes;confusion/agitation;fatigue/slow reaction;gait/mobility problems;sleep pattern alteration;environment unfamiliar --    Signs and Symptoms (Fall Risk) --  presence of risk factors     Goal: Absence of Fall  Outcome: Ongoing (interventions implemented as appropriate)   12/12/18 1194    Fall Risk (Adult)   Absence of Fall making progress toward outcome       Problem: Self-Care Deficit (Adult,Obstetrics,Pediatric)  Goal: Identify Related Risk Factors and Signs and Symptoms  Outcome: Ongoing (interventions implemented as appropriate)   12/12/18 0141   Self-Care Deficit (Adult,Obstetrics,Pediatric)   Related Risk Factors (Self-Care Deficit) activity intolerance;pain/discomfort   Signs and Symptoms (Self-Care Deficit) requests assistance     Goal: Improved Ability to Perform BADL and IADL  Outcome: Ongoing (interventions implemented as appropriate)   12/12/18 2009   Self-Care Deficit (Adult,Obstetrics,Pediatric)   Improved Ability to Perform BADL and IADL making progress toward outcome

## 2018-12-14 ENCOUNTER — READMISSION MANAGEMENT (OUTPATIENT)
Dept: CALL CENTER | Facility: HOSPITAL | Age: 68
End: 2018-12-14

## 2018-12-14 NOTE — OUTREACH NOTE
Prep Survey      Responses   Facility patient discharged from?  [facility]   Is patient eligible?  Yes   Discharge diagnosis  sepis   Does the patient have one of the following disease processes/diagnoses(primary or secondary)?  Sepsis   Does the patient have Home health ordered?  Yes   What is the Home health agency?   [facility]   Is there a DME ordered?  No   Comments regarding appointments  Please see AVS   General alerts for this patient  Pancreatic CA   Prep survey completed?  Yes          Zuleyma Penn RN

## 2018-12-15 ENCOUNTER — READMISSION MANAGEMENT (OUTPATIENT)
Dept: CALL CENTER | Facility: HOSPITAL | Age: 68
End: 2018-12-15

## 2018-12-15 NOTE — OUTREACH NOTE
Sepsis Week 1 Survey      Responses   Facility patient discharged from?  [facility]   Does the patient have one of the following disease processes/diagnoses(primary or secondary)?  Sepsis   Is there a successful TCM telephone encounter documented?  No   Week 1 attempt successful?  Yes   Call start time  1350   Call end time  1354   General alerts for this patient  Pancreatic CA   Discharge diagnosis  sepis   Person spoke with today (if not patient) and relationship  Wife   Meds reviewed with patient/caregiver?  Yes   Is the patient having any side effects they believe may be caused by any medication additions or changes?  No   Does the patient have all medications related to this admission filled (includes all antibiotics, inhalers, nebulizers,steroids,etc.)  Yes   Is the patient taking all medications as directed (includes completed medication regime)?  Yes   Does the patient have a primary care provider?   Yes   Does the patient have an appointment with their PCP within 7 days of discharge?  Yes   Has the patient kept scheduled appointments due by today?  N/A   Psychosocial issues?  No   Did the patient receive a copy of their discharge instructions?  Yes   Nursing interventions  Reviewed instructions with patient   What is the patient's perception of their health status since discharge?  Improving   Nursing interventions  Nurse provided patient education   Is the patient/caregiver able to teach back Sepsis?  P - Pale or discolored skin, S - Shivering,fever or very cold, S - Short of breath   Nursing interventions  Nurse provided reassurance to patient   Is patient/caregiver able to teach back steps to recovery at home?  Rest and regain strength, Eat a balanced diet, Set small, achievable goals for return to baseline health   Is the patient/caregiver able to teach back signs and symptoms of worsening condition:  Fever, Shortness of breath/rapid respiratory rate, Altered mental status(confusion/coma)   Is the  "patient/caregiver able to teach back the hierarchy of who to call/visit for symptoms/problems? PCP, Specialist, Home health nurse, Urgent Care, ED, 911  Yes   Additional teach back comments  Pt was sleeping during call. His confusion is better. Wife says Dr wants to \"build him Up\" so he can have \"whipple surgery\" before starting chemo again.     Week 1 call completed?  Yes          Marga Alejandre RN  "

## 2018-12-18 ENCOUNTER — OFFICE VISIT (OUTPATIENT)
Dept: ONCOLOGY | Facility: CLINIC | Age: 68
End: 2018-12-18

## 2018-12-18 ENCOUNTER — INFUSION (OUTPATIENT)
Dept: ONCOLOGY | Facility: HOSPITAL | Age: 68
End: 2018-12-18
Attending: INTERNAL MEDICINE

## 2018-12-18 ENCOUNTER — LAB (OUTPATIENT)
Dept: ONCOLOGY | Facility: CLINIC | Age: 68
End: 2018-12-18

## 2018-12-18 ENCOUNTER — APPOINTMENT (OUTPATIENT)
Dept: ONCOLOGY | Facility: HOSPITAL | Age: 68
End: 2018-12-18
Attending: INTERNAL MEDICINE

## 2018-12-18 ENCOUNTER — DOCUMENTATION (OUTPATIENT)
Dept: OTHER | Facility: HOSPITAL | Age: 68
End: 2018-12-18

## 2018-12-18 VITALS
TEMPERATURE: 98.4 F | WEIGHT: 196.8 LBS | HEART RATE: 102 BPM | SYSTOLIC BLOOD PRESSURE: 121 MMHG | OXYGEN SATURATION: 98 % | RESPIRATION RATE: 20 BRPM | BODY MASS INDEX: 26.69 KG/M2 | DIASTOLIC BLOOD PRESSURE: 82 MMHG

## 2018-12-18 VITALS
OXYGEN SATURATION: 98 % | TEMPERATURE: 98.4 F | BODY MASS INDEX: 26.69 KG/M2 | HEART RATE: 102 BPM | WEIGHT: 196.8 LBS | RESPIRATION RATE: 20 BRPM | SYSTOLIC BLOOD PRESSURE: 121 MMHG | DIASTOLIC BLOOD PRESSURE: 82 MMHG

## 2018-12-18 DIAGNOSIS — R11.0 NAUSEA: ICD-10-CM

## 2018-12-18 DIAGNOSIS — E86.0 DEHYDRATION: ICD-10-CM

## 2018-12-18 DIAGNOSIS — G89.3 NEOPLASM RELATED PAIN: ICD-10-CM

## 2018-12-18 DIAGNOSIS — K59.00 CONSTIPATION, UNSPECIFIED CONSTIPATION TYPE: ICD-10-CM

## 2018-12-18 DIAGNOSIS — I48.91 ATRIAL FIBRILLATION, UNSPECIFIED TYPE (HCC): ICD-10-CM

## 2018-12-18 DIAGNOSIS — E63.9 POOR NUTRITION: ICD-10-CM

## 2018-12-18 DIAGNOSIS — C25.0 MALIGNANT NEOPLASM OF HEAD OF PANCREAS (HCC): ICD-10-CM

## 2018-12-18 DIAGNOSIS — C25.0 MALIGNANT NEOPLASM OF HEAD OF PANCREAS (HCC): Primary | ICD-10-CM

## 2018-12-18 LAB
ALBUMIN SERPL-MCNC: 4 G/DL (ref 3.4–4.8)
ALBUMIN/GLOB SERPL: 1.3 G/DL (ref 1.5–2.5)
ALP SERPL-CCNC: 98 U/L (ref 40–129)
ALT SERPL W P-5'-P-CCNC: 16 U/L (ref 10–44)
ANION GAP SERPL CALCULATED.3IONS-SCNC: 7.8 MMOL/L (ref 3.6–11.2)
AST SERPL-CCNC: 24 U/L (ref 10–34)
BASOPHILS # BLD AUTO: 0.03 10*3/MM3 (ref 0–0.3)
BASOPHILS NFR BLD AUTO: 0.3 % (ref 0–2)
BILIRUB SERPL-MCNC: 0.4 MG/DL (ref 0.2–1.8)
BUN BLD-MCNC: 28 MG/DL (ref 7–21)
BUN/CREAT SERPL: 21.1 (ref 7–25)
CALCIUM SPEC-SCNC: 9.8 MG/DL (ref 7.7–10)
CHLORIDE SERPL-SCNC: 101 MMOL/L (ref 99–112)
CO2 SERPL-SCNC: 23.2 MMOL/L (ref 24.3–31.9)
CREAT BLD-MCNC: 1.33 MG/DL (ref 0.43–1.29)
DEPRECATED RDW RBC AUTO: 51.7 FL (ref 37–54)
EOSINOPHIL # BLD AUTO: 0.62 10*3/MM3 (ref 0–0.7)
EOSINOPHIL NFR BLD AUTO: 7 % (ref 0–7)
ERYTHROCYTE [DISTWIDTH] IN BLOOD BY AUTOMATED COUNT: 16.2 % (ref 11.5–14.5)
GFR SERPL CREATININE-BSD FRML MDRD: 53 ML/MIN/1.73
GLOBULIN UR ELPH-MCNC: 3.2 GM/DL
GLUCOSE BLD-MCNC: 184 MG/DL (ref 70–110)
HCT VFR BLD AUTO: 35.6 % (ref 42–52)
HGB BLD-MCNC: 11.6 G/DL (ref 14–18)
IMM GRANULOCYTES # BLD: 0.14 10*3/MM3 (ref 0–0.03)
IMM GRANULOCYTES NFR BLD: 1.6 % (ref 0–0.5)
LYMPHOCYTES # BLD AUTO: 1.12 10*3/MM3 (ref 1–3)
LYMPHOCYTES NFR BLD AUTO: 12.6 % (ref 16–46)
MCH RBC QN AUTO: 29.4 PG (ref 27–33)
MCHC RBC AUTO-ENTMCNC: 32.6 G/DL (ref 33–37)
MCV RBC AUTO: 90.1 FL (ref 80–94)
MONOCYTES # BLD AUTO: 0.73 10*3/MM3 (ref 0.1–0.9)
MONOCYTES NFR BLD AUTO: 8.2 % (ref 0–12)
NEUTROPHILS # BLD AUTO: 6.28 10*3/MM3 (ref 1.4–6.5)
NEUTROPHILS NFR BLD AUTO: 70.3 % (ref 40–75)
OSMOLALITY SERPL CALC.SUM OF ELEC: 274.7 MOSM/KG (ref 273–305)
PLATELET # BLD AUTO: 152 10*3/MM3 (ref 130–400)
PMV BLD AUTO: 11.2 FL (ref 6–10)
POTASSIUM BLD-SCNC: 4.2 MMOL/L (ref 3.5–5.3)
PROT SERPL-MCNC: 7.2 G/DL (ref 6–8)
RBC # BLD AUTO: 3.95 10*6/MM3 (ref 4.7–6.1)
SODIUM BLD-SCNC: 132 MMOL/L (ref 135–153)
WBC NRBC COR # BLD: 8.92 10*3/MM3 (ref 4.5–12.5)

## 2018-12-18 PROCEDURE — 96360 HYDRATION IV INFUSION INIT: CPT

## 2018-12-18 PROCEDURE — 96374 THER/PROPH/DIAG INJ IV PUSH: CPT

## 2018-12-18 PROCEDURE — 96361 HYDRATE IV INFUSION ADD-ON: CPT

## 2018-12-18 PROCEDURE — 96366 THER/PROPH/DIAG IV INF ADDON: CPT

## 2018-12-18 PROCEDURE — 85025 COMPLETE CBC W/AUTO DIFF WBC: CPT | Performed by: NURSE PRACTITIONER

## 2018-12-18 PROCEDURE — 25010000002 MAGNESIUM SULFATE 2 GM/50ML SOLUTION: Performed by: INTERNAL MEDICINE

## 2018-12-18 PROCEDURE — 99214 OFFICE O/P EST MOD 30 MIN: CPT | Performed by: NURSE PRACTITIONER

## 2018-12-18 PROCEDURE — 80053 COMPREHEN METABOLIC PANEL: CPT | Performed by: NURSE PRACTITIONER

## 2018-12-18 PROCEDURE — 96365 THER/PROPH/DIAG IV INF INIT: CPT

## 2018-12-18 RX ORDER — MAGNESIUM SULFATE HEPTAHYDRATE 40 MG/ML
2 INJECTION, SOLUTION INTRAVENOUS ONCE
Status: COMPLETED | OUTPATIENT
Start: 2018-12-18 | End: 2018-12-18

## 2018-12-18 RX ORDER — PROMETHAZINE HYDROCHLORIDE 12.5 MG/1
12.5 TABLET ORAL EVERY 6 HOURS PRN
Qty: 90 TABLET | Refills: 3 | Status: ON HOLD | OUTPATIENT
Start: 2018-12-18 | End: 2019-08-06

## 2018-12-18 RX ORDER — ONDANSETRON HYDROCHLORIDE 8 MG/1
8 TABLET, FILM COATED ORAL EVERY 8 HOURS PRN
Qty: 60 TABLET | Refills: 3 | Status: SHIPPED | OUTPATIENT
Start: 2018-12-18

## 2018-12-18 RX ORDER — SODIUM CHLORIDE 9 MG/ML
1000 INJECTION, SOLUTION INTRAVENOUS ONCE
Status: COMPLETED | OUTPATIENT
Start: 2018-12-18 | End: 2018-12-18

## 2018-12-18 RX ADMIN — MAGNESIUM SULFATE IN WATER 2 G: 40 INJECTION, SOLUTION INTRAVENOUS at 13:45

## 2018-12-18 RX ADMIN — SODIUM CHLORIDE 1000 ML: 9 INJECTION, SOLUTION INTRAVENOUS at 13:45

## 2018-12-18 NOTE — PROGRESS NOTES
Tamara from Dr. Alves office called today to let me know that  called them and said that patient's magnesium level was 0.8. Dr. Alves wanted Dr. Brody to know in case he wanted adjust the magnesium in the TPN. Joselyn was giving patient 2 grams of magnesium in his IV today. I also reminded Tamara that patient has appointment with [facility] tomorrow and Tamara said that patient is planning on being there. I also notified Dr. Brody and Aletha of patient's magnesium level and that patient was getting an infusion of magnesium today. AG

## 2018-12-18 NOTE — PROGRESS NOTES
DATE : 12/18/18    DIAGNOSIS:   1.  Pancreatic cancer    2.  Repeated episodes of bacteremia due to cholangitis -    3.  Refractory nausea/vomiting    CHIEF COMPLAINT:  Follow up of pancreatic cancer    TREATMENT:  1.       HISTORY OF PRESENT ILLNESS:   Todd Phillips is a very pleasant 68 y.o. male who is being seen today at the request of Dr. Miquel Brody for evaluation and treatment of pancreatic cancer.  Mr. Phillips initially developed symptoms in January of 2018. At that time he had pain in his upper abdomen which would radiate to his back.  In July, the pain intensified.  He was seen in the ER and also by Dr. Su.  He underwent RUQ U/S and then HIDA scan and it was determined he had a dysfunctional gallbladder.  He saw Dr. Downs and had cholecystectomy on 8-10-18.  Pathology showed chronic cholecystitis and an incidental benign lymph node.  He re-presented in the post-operative period with jaundice.  Dr. Su performed ERCP on8-14-18.  He was noted to have a 3 cm irregular tight stricture of the distal common bile duct with proximal biliary ductal dilatation.  Biliary papillotomy and stricture dilatation performed and brushings taken.  A 10 Fr x 5 cm biliary stent was placed.  Brushings were taken but were negative.  There was sl dilatation of the distal pancreatic duct without discrete stricture.   He was discharged with improving jaundice on 8-16.  On 8-21 he represented with sepsis due to Klebsiella pneumonia bacteremia and was admitted.  He then was referred to Dr. Brody for evaluation and treatment.  He has been set up for EUS with biopsy next Friday for what appears to be a primary pancreatic malignancy in the head of the pancreas.  Dr. Brody has referred him for consideration of neoadjuvant therapy.    INTERVAL HISTORY:  Mr. Phillips is here today with his wife for follow up of pancreatic cancer. Shortly after his last OV, he developed fever and was admitted to hospital with sepsis, etiology  unknown. He was discharged home last week on  Omnicef and Flagyl per ID which he is taking. Denies any fevers/chills. During hospitalization, he received phenergan and zofran scheduled which seemed to better control his nausea. He continues to receive TPN per recommendations of Dr. Hale. He will follow up with him tomorrow. He says his pain is controlled with MS Contin 30 mg BID. He also has oxycodone prn but rarely has to use this. He says he is feeling much better today with improvement in fatigue and weakness. Physical therapy as been working with him at home. He has been drinking approximately 4 glasses of water/day. His nausea is well controlled with changes mentioned above. He denies any other complaints today.     PAST MEDICAL HISTORY:  Past Medical History:   Diagnosis Date   • Antral gastritis    • Asthma    • Atrial fibrillation (CMS/HCC)     treated with oral blood thinner   • Chest pain    • COPD (chronic obstructive pulmonary disease) (CMS/[facility])    • Coronary artery disease     no stents   • Diabetes mellitus (CMS/[facility])     type 2    • Duodenitis    • Elevated cholesterol    • Emphysema of lung (CMS/[facility])    • Gallbladder disease     removed    • GERD (gastroesophageal reflux disease)    • Hyperlipidemia    • Hypertension    • Jaundice    • Kidney stone    • Kidney stones     had lithotripsy and passed 36 kidney stones as well as had one surgically removed.   • Malignant neoplasm of head of pancreas (CMS/HCC) 9/5/2018   • Palpitations    • Pneumonia 08/2018   • Reflux esophagitis    • Renal failure     stage 3 kidney disease   • Sepsis (CMS/HCC)        PAST SURGICAL HISTORY:  Past Surgical History:   Procedure Laterality Date   • BILE DUCT STENT PLACEMENT      [facility] 2 weeks ago    • CARDIAC CATHETERIZATION  11/01/1999   • CARDIOVASCULAR STRESS TEST  09/2012   • COLONOSCOPY     • CYSTOSCOPY BLADDER STONE LITHOTRIPSY     • ECHO - CONVERTED  09/2012   • KIDNEY STONE SURGERY     •  KIDNEY STONE SURGERY      open abdominal surgery   • UPPER GASTROINTESTINAL ENDOSCOPY  2012       FAMILY HISTORY:  Family History   Problem Relation Age of Onset   • Heart attack Mother    • Heart disease Mother    • Stroke Mother    • Kidney disease Mother    • Heart failure Mother    • Lung disease Father    • Tuberculosis Father    • Diabetes Sister    • Heart attack Brother    • Heart failure Brother    • Heart disease Brother    • Diabetes Sister    • No Known Problems Brother    • No Known Problems Brother        SOCIAL HISTORY:  Social History     Socioeconomic History   • Marital status:      Spouse name: Not on file   • Number of children: Not on file   • Years of education: Not on file   • Highest education level: Not on file   Social Needs   • Financial resource strain: Not on file   • Food insecurity - worry: Not on file   • Food insecurity - inability: Not on file   • Transportation needs - medical: Not on file   • Transportation needs - non-medical: Not on file   Occupational History   • Not on file   Tobacco Use   • Smoking status: Never Smoker   • Smokeless tobacco: Never Used   Substance and Sexual Activity   • Alcohol use: No   • Drug use: No   • Sexual activity: Defer     Partners: Female   Other Topics Concern   • Not on file   Social History Narrative    He is  and lives alone with his wife although they have 3 children together who all live close by. He is retired from the [employer] and was exposed to Agent Orange during [place]. He is a lifelong nonsmoker.         A DIL  of cancer.    REVIEW OF SYSTEMS:   A comprehensive 14 point review of systems was performed.  Significant findings as mentioned above.  All other systems reviewed and are negative.      MEDICATIONS:  The current medication list was reviewed in the EMR    Current Outpatient Medications:   •  albuterol (PROVENTIL HFA;VENTOLIN HFA) 108 (90 BASE) MCG/ACT inhaler, Inhale 2 puffs Every 4 (Four) Hours As  Needed for Wheezing or Shortness of Air., Disp: , Rfl:   •  albuterol (PROVENTIL) (2.5 MG/3ML) 0.083% nebulizer solution, Take 2.5 mg by nebulization 2 (Two) Times a Day As Needed for Wheezing or Shortness of Air., Disp: , Rfl:   •  allopurinol (ZYLOPRIM) 100 MG tablet, Take 100 mg by mouth Daily., Disp: , Rfl:   •  apixaban (ELIQUIS) 5 MG tablet tablet, Take 1 tablet by mouth Every 12 (Twelve) Hours., Disp: 60 tablet, Rfl: 0  •  cefdinir (OMNICEF) 300 MG capsule, Take 1 capsule by mouth Every 12 (Twelve) Hours for 10 days., Disp: 20 capsule, Rfl: 0  •  cholecalciferol (VITAMIN D3) 1000 units tablet, Take 1,000 Units by mouth Daily., Disp: , Rfl:   •  colchicine 0.6 MG tablet, Take 1 tablet by mouth Daily., Disp: 90 tablet, Rfl: 2  •  dronabinol (MARINOL) 5 MG capsule, Take 1 capsule by mouth 2 (Two) Times a Day Before Meals., Disp: 60 capsule, Rfl: 2  •  flecainide (TAMBOCOR) 50 MG tablet, Take 1 tablet by mouth Every 12 (Twelve) Hours., Disp: 60 tablet, Rfl: 0  •  FLUoxetine (PROzac) 20 MG capsule, Take 20 mg by mouth 2 (Two) Times a Day., Disp: , Rfl:   •  lansoprazole (PREVACID) 30 MG capsule, Take 1 capsule by mouth twice daily (With Breakfast & Dinner)., Disp: 60 capsule, Rfl: 0  •  linaclotide (LINZESS) 145 MCG capsule capsule, Take 1 capsule by mouth Every Morning Before Breakfast., Disp: 30 capsule, Rfl: 3  •  LORazepam (ATIVAN) 0.5 MG tablet, Take one to two tablets by mouth every 4 to 6 hours for nausea. (Patient taking differently: Take 0.5 mg by mouth Every 6 (Six) Hours As Needed for Anxiety. Take one to two tablets by mouth every 4 to 6 hours for nausea.), Disp: 120 tablet, Rfl: 0  •  metroNIDAZOLE (FLAGYL) 500 MG tablet, Take 1 tablet by mouth Every 12 (Twelve) Hours for 10 days., Disp: 20 tablet, Rfl: 0  •  mometasone (ASMANEX) 220 MCG/INH inhaler, Inhale 2 puffs Every Night., Disp: , Rfl:   •  montelukast (SINGULAIR) 10 MG tablet, Take 10 mg by mouth Daily., Disp: , Rfl:   •  Morphine (MS CONTIN)  30 MG 12 hr tablet, Take 1 tablet by mouth Every 12 (Twelve) Hours., Disp: 60 tablet, Rfl: 0  •  nitroglycerin (NITROSTAT) 0.4 MG SL tablet, Place 0.4 mg under the tongue as needed for chest pain., Disp: , Rfl:   •  ondansetron (ZOFRAN) 4 MG tablet, Take 1 tablet by mouth Every 8 (Eight) Hours As Needed for Nausea or Vomiting., Disp: 30 tablet, Rfl: 0  •  oxyCODONE (ROXICODONE) 5 MG immediate release tablet, Take 5 mg by mouth Every 4 (Four) Hours As Needed for Moderate Pain ., Disp: , Rfl:   •  polyethylene glycol (MIRALAX) packet, Take 17 g by mouth Daily As Needed., Disp: , Rfl:   •  promethazine (PHENERGAN) 12.5 MG tablet, Take 1 tablet by mouth Every 6 (Six) Hours As Needed for Nausea or Vomiting., Disp: 30 tablet, Rfl: 0  •  Tiotropium Bromide-Olodaterol 2.5-2.5 MCG/ACT aerosol solution, Inhale 2 puffs Daily., Disp: , Rfl:   No current facility-administered medications for this visit.     Facility-Administered Medications Ordered in Other Visits:   •  sodium chloride 0.9 % infusion  - ADS Override Pull, , , ,   •  sodium chloride 0.9 % infusion  - ADS Override Pull, , , ,     ALLERGIES:    Allergies   Allergen Reactions   • Contrast Dye Other (See Comments)     Can't have due to kidney failure per family     PHYSICAL EXAM:  Vitals:    12/18/18 1211   BP: 121/82   Pulse: 102   Resp: 20   Temp: 98.4 °F (36.9 °C)   SpO2: 98%   General:  Awake, alert and oriented. Appears to be feeling well today, in good spirits.   HEENT:  Pupils are equal, round and reactive to light and accommodation, Extra-ocular movements full, Oropharyx clear, mmm   Neck:  No JVD, thyromegaly or lymphadenopathy  CV:  S1,S2 no murmurs, rubs or gallops  Resp:  Lungs are clear to auscultation bilaterally  Abd:  Soft, sl tender to palpation bi over the epigastric and RUQ areas, non-distended, bowel sounds normal, no organomegaly or masses.  Surgical incisions well-healed.  Ext:  No clubbing, cyanosis or edema  Lymph:  No cervical,  supraclavicular, axillary,adenopathy  Neuro: grossly non-focal exam    PATHOLOGY:  08-15-18         ENDOSCOPY:  18 ERCP with papillotomy, balloon rotation of the biliary stricture, pathology brushings, ileum stent placement ([place])  1.  Irregular 3 cm distal common bile duct stricture concerning for neoplastic disease. Biliary papillotomy, stricture dilatation by  through the scope balloon, cytology brushings performed and 10 Albanian by 5 cm biliary stent placed.    2.  Dilated common hepatic duct to 15 mm when fully contrast filled.  Dilated intrahepatic biliary tree.    3.  No stones proximal to the stricture were identified.    4.  Slight dilatation of the distal pancreatic duct without a discrete stricture identified.    IMAGIN18 CT Abdomen Pelvis Stone Protocol   Findings  - LOWER THORAX: Clear. No effusions.  - LIVER: Homogeneous. No focal hepatic mass or ductal dilatation.  - GALLBLADDER: MINIMAL STRANDING AROUND REGION OF GALLBLADDER FOSSA  THAT MAY BE POSTSURGICAL.  - PANCREAS: Unremarkable. No mass or ductal dilatation.  - SPLEEN: Homogeneous. No splenomegaly.  - ADRENALS: No mass.  - KIDNEYS: No mass. No obstructive uropathy.  No evidence of urolithiasis.  - GI TRACT: SMALL HIATAL HERNIA.  - PERITONEUM: No free air. No free fluid or loculated fluid collections.  - MESENTERY: Unremarkable.  - LYMPH NODES: No lymphadenopathy.  - VASCULATURE: ATHEROSCLEROTIC VASCULAR CALCIFICATION.  - ABDOMINAL WALL: No focal hernia or mass.  - OTHER: None.  - BLADDER: No focal mass or significant wall thickening  - REPRODUCTIVE: Unremarkable as visualized.  - APPENDIX: Nondistended. No surrounding inflammation.  - BONES: No acute bony abnormality.     IMPRESSION:  - Minimal stranding around region of gallbladder fossa that may be postsurgical.     18 US Liver   FINDINGS:   - Visualized pancreas is unremarkable.   - The gallbladder is absent. No collection identified.   - The CBD measures [id number]  mm    .  - The liver demonstrates normal echogenicity without focal lesion.    - No ascites demonstrated.        IMPRESSION:  - As above.    08-22-18 CT Abdomen Pelvis Stone Protocol   FINDINGS:   - Minimal bibasilar atelectasis.  - Hiatal hernia.  - The liver is homogeneous. There is no evidence of focal hepatic mass.  - The spleen is homogeneous.  - Stent is noted traversing the common bile duct.  - 3 mm nodule in the right lung on image 8 of the axial series.  - There is no adrenal enlargement.  - The kidneys show no evidence of hydronephrosis or hydroureter. I do not see any distal ureteral stones.   - Otherwise I do not see any free fluid or walled off fluid collections.  - There is moderate to large volume stool in the colon.  - There is no evidence of mesenteric or retroperitoneal adenopathy.     IMPRESSION:  1. Tiny nodule in the right lung base.  2. Minimal bibasilar atelectasis.  3. Moderate to large volume stool in the colon.     Other findings as above.    08-22-18 CT Abdomen Pelvis With Contrast   FINDINGS:   - Tiny left basilar nodule measuring 4 mm on image 14 of the axial series.  - Minimal bibasilar atelectasis.  - The liver is homogeneous. There is no evidence of focal hepatic mass  - The spleen is homogeneous  - Indistinct appearance of the pancreatic head. A biliary stent is present.  - Small left adrenal nodule is present.  - The kidneys show no evidence of hydronephrosis or hydroureter. I do not see any distal ureteral stones.   - Otherwise I do not see any free fluid or walled off fluid collections.  - Large volume stool is present in the colon.     IMPRESSION:  1. Slightly enlarged, indistinct appearance of the pancreatic head.  - Underlying neoplasm certainly not excluded but no delineable mass is present. There is a biliary stent.    2. Tiny nodule in the left lung base.    3. Small left adrenal nodule.    4. Hiatal hernia.    5. Moderate to large volume stool in the colon.      09-11-18 CT  Chest Without Contrast   FINDINGS:   - Minimal coronary artery calcifications present.  - No pericardial or pleural effusions.  - No parenchymal soft tissue nodules or masses. There are findings of COPD.     IMPRESSION:  - COPD.  - Hiatal hernia.  - Minimal coronary artery calcification.       09-11-18 CT Abdomen Pelvis Without Contrast   FINDINGS:   - Lung bases show mild increased interstitial markings.  - There is a hiatal hernia.  - The liver is homogeneous. There is no evidence of focal hepatic mass.  - The spleen is homogeneous.  - Mildly indistinct appearance of the pancreas. There is a biliary stent present. I cannot exclude mild degree of pancreatitis..  - There is no adrenal enlargement.  - The kidneys show no evidence of hydronephrosis or hydroureter. I do not see any distal ureteral stones.   - Otherwise I do not see any free fluid or walled off fluid collections.  - Large volume stool is present in the colon.  - Urinary bladder wall is slightly thickened.  - There is no evidence of mesenteric or retroperitoneal adenopathy.    IMPRESSION:  1. Mildly indistinct appearance of the proximal pancreas.  2. Urinary bladder wall thickening.  3. Large volume stool in the colon.    09-14-18 CT Abdomen Without Contrast   FINDINGS:   - Again noted is very mild indistinct appearance of the pancreatic head. Stent is stable in position.  - The liver is stable and homogeneous.  - Spleen is homogeneous.  - No adrenal enlargement.  - Moderate to large volume stool in the colon.     IMPRESSION:  - No significant interval change since the previous exam.     09-21-18 NM Pet Skull Base To Mid Thigh   FINDINGS:      HEAD/NECK:  - No FDG hypermetabolic neck adenopathy.  - No FDG hypermetabolic masses.     CHEST:   - No FDG hypermetabolic thoracic adenopathy.  - No FDG hypermetabolic lung nodules or masses.  - Evaluation for tiny parenchymal nodules is somewhat limited on low dose CT secondary to respiratory motion.      ABDOMEN/PELVIS:   - There is hypermetabolic activity in the pancreatic head with a maximum SUV of 4.971.  - Physiologic FDG hypermetabolism seen throughout the GI tract.  - Physiologic FDG hypermetabolism seen throughout the mesentery, retroperitoneum and pelvis.     BONES:   - There are scattered foci of FDG hypermetabolism most suggestive of degenerative change seen throughout the axial skeleton. If concern persist for metastatic lesions of the bone, HDP Bone scan is recommended for further evaluation.     IMPRESSION:  - Abnormal hypermetabolic activity corresponding to area of indistinctness in the pancreatic head. Maximum SUV is 4.97.    RECENT LABS:  Lab Results   Component Value Date    WBC 8.92 12/18/2018    HGB 11.6 (L) 12/18/2018    HCT 35.6 (L) 12/18/2018    MCV 90.1 12/18/2018    RDW 16.2 (H) 12/18/2018     12/18/2018    NEUTRORELPCT 70.3 12/18/2018    LYMPHORELPCT 12.6 (L) 12/18/2018    MONORELPCT 8.2 12/18/2018    EOSRELPCT 7.0 12/18/2018    BASORELPCT 0.3 12/18/2018    NEUTROABS 6.28 12/18/2018    LYMPHSABS 1.12 12/18/2018       Lab Results   Component Value Date     (L) 12/18/2018    K 4.2 12/18/2018    CO2 23.2 (L) 12/18/2018     12/18/2018    BUN 28 (H) 12/18/2018    CREATININE 1.33 (H) 12/18/2018    EGFRIFNONA 53 (L) 12/18/2018    EGFRIFAFRI  09/02/2016      Comment:      <15 Indicative of kidney failure.    GLUCOSE 184 (H) 12/18/2018    CALCIUM 9.8 12/18/2018    ALKPHOS 98 12/18/2018    AST 24 12/18/2018    ALT 16 12/18/2018    BILITOT 0.4 12/18/2018    ALBUMIN 4.00 12/18/2018    PROTEINTOT 7.2 12/18/2018    MG 2.1 12/12/2018    PHOS 4.4 12/11/2018       Lab Results   Component Value Date/Time    URICACID 7.5 (H) 10/16/2018 12:18 PM     Lab Results   Component Value Date     1089 (H) 12/10/2018     1576 (H) 11/13/2018     5149 (H) 10/19/2018     7602 (H) 09/25/2018     3636 (H) 09/07/2018     4032 (H) 08/15/2018     ASSESSMENT & PLAN:  Todd  Alan is a very pleasant 68 y.o. male with presumed cancer of the head of the pancreas with a malignant appearing stricture in the CBD.    1.  Pancreatic cancer:  -  Imaging shows vague abnormality in the head of the pancreas which is hypermetabolic on PET-CT.  Biopsy was c/w pancreatic cancer and Ca19-9 is markedly elevated.    -  Biliary stent was exchanged for metal stent to relieve obstruction / cholangitis.   -Recommended neoadjuvant chemotherapy with Gemcitabine and Abraxane and he has completed one cycle to date. He has had significant improvement in CA 19-9 as above.   - Unfortunately, he has been struggling with ongoing nausea/vomiting and dehydration. As a result, he has very poor nutrition and his symptoms have made it very difficult to treat him. Dr. Alves called and d/w Dr. Hale who  recommended we start him on TPN which has been very helpful. Unfortunately, shortly after his last OV, he developed fever and was admitted to hospital with sepsis, etiology unknown. He was discharged home last week on  Omnicef and Flagyl per ID. Clinically he is doing much better with supportive care. He is scheduled to follow up with  tomorrow to discuss possibility of surgery.  -Will continue holding treatment for now and provide supportive care. Will follow up again next week with repeat labs.     2.  Nausea/Dehydration/Poor nutrition:  -He had using Sancuso patch and Compazine and Ativan 0.5 mg (1-2 tabs) scheduled every 4 hours along with Marinol 5 mg BID. However, this was changed to scheduled zofran and phenergan while recently hospitalized and seems to be helping better control his symptoms. Therefore, have discontinued Sancuso patch. Will continue scheduled zofran and phenergan, refills provided today. Also continue Marinol BID and Ativan prn.  -Will continueTPN per Dr. Hale's recommendations which has been very helpful.   -He continues to struggle with oral hydration. Cr 1.33 today.  Therefore, will give 1L of IVF in clinic today. He knows to push oral hydration.     3.  Neoplasm related pain:  -Currently well controlled with MSContin 30 mg BID. He also has Oxycodone 5 mg 1-3 tabs q 4h prn (which he rarely takes (maybe 1-2 times/week).     4. Constipation: Currently denies. Continue Senna/ Colace ii BID with Miralax as needed.      5.   History of Atrial fibrillation:  Chronically anticoagulated on Eliquis.     6.  Prophylaxis:  Has had 2018 influenza vaccine.      7.  ACO Quality measures  - Todd Phillips does not use tobacco products.  - Patient's Body mass index is 26.69 kg/m². BMI is above normal parameters. Recommendations include: none given current problems above. .  - Current outpatient and discharge medications have been reconciled for the patient.  Reviewed by:VIMAL Nielson      I spent 25 minutes with Todd Phillips today.  More than 50% of the time was spent in counseling / coordination of care for the above problems.      Electronically Signed by: VIMAL Rolle      CC:   MD Miquel Quintana MD Aaron House, MD Michael Simons, MD

## 2018-12-19 ENCOUNTER — HOSPITAL ENCOUNTER (OUTPATIENT)
Dept: CT IMAGING | Facility: HOSPITAL | Age: 68
Discharge: HOME | End: 2018-12-19
Attending: SURGERY | Admitting: SURGERY

## 2018-12-19 ENCOUNTER — DOCUMENTATION (OUTPATIENT)
Dept: OTHER | Facility: HOSPITAL | Age: 68
End: 2018-12-19

## 2018-12-19 ENCOUNTER — APPOINTMENT (OUTPATIENT)
Dept: PREADMISSION TESTING | Facility: HOSPITAL | Age: 68
End: 2018-12-19

## 2018-12-19 VITALS — BODY MASS INDEX: 26.95 KG/M2 | HEIGHT: 72 IN | WEIGHT: 199 LBS

## 2018-12-19 VITALS
TEMPERATURE: 98.3 F | RESPIRATION RATE: 18 BRPM | SYSTOLIC BLOOD PRESSURE: 101 MMHG | HEART RATE: 93 BPM | BODY MASS INDEX: 26.67 KG/M2 | OXYGEN SATURATION: 94 % | HEIGHT: 72 IN | WEIGHT: 196.87 LBS | DIASTOLIC BLOOD PRESSURE: 68 MMHG

## 2018-12-19 DIAGNOSIS — Z01.89 LABORATORY TEST: Primary | ICD-10-CM

## 2018-12-19 DIAGNOSIS — C25.0 MALIGNANT NEOPLASM OF HEAD OF PANCREAS (HCC): ICD-10-CM

## 2018-12-19 LAB
ABO GROUP BLD: NORMAL
INR PPP: 1.09 (ref 0.91–1.09)
PROTHROMBIN TIME: 11.4 SECONDS (ref 9.6–11.5)
RH BLD: POSITIVE

## 2018-12-19 PROCEDURE — 36415 COLL VENOUS BLD VENIPUNCTURE: CPT

## 2018-12-19 PROCEDURE — 74170 CT ABD WO CNTRST FLWD CNTRST: CPT

## 2018-12-19 PROCEDURE — 71260 CT THORAX DX C+: CPT

## 2018-12-19 PROCEDURE — 86901 BLOOD TYPING SEROLOGIC RH(D): CPT

## 2018-12-19 PROCEDURE — 86301 IMMUNOASSAY TUMOR CA 19-9: CPT | Performed by: SURGERY

## 2018-12-19 PROCEDURE — 86900 BLOOD TYPING SEROLOGIC ABO: CPT

## 2018-12-19 PROCEDURE — 85610 PROTHROMBIN TIME: CPT | Performed by: SURGERY

## 2018-12-19 PROCEDURE — 0 IOPAMIDOL PER 1 ML: Performed by: SURGERY

## 2018-12-19 RX ORDER — SODIUM CHLORIDE 0.9 % (FLUSH) 0.9 %
3-10 SYRINGE (ML) INJECTION AS NEEDED
Status: DISCONTINUED | OUTPATIENT
Start: 2018-12-19 | End: 2018-12-20 | Stop reason: HOSPADM

## 2018-12-19 RX ORDER — SODIUM CHLORIDE 0.9 % (FLUSH) 0.9 %
3 SYRINGE (ML) INJECTION EVERY 12 HOURS SCHEDULED
Status: DISCONTINUED | OUTPATIENT
Start: 2018-12-19 | End: 2018-12-20 | Stop reason: HOSPADM

## 2018-12-19 RX ADMIN — SODIUM CHLORIDE 801 ML: 9 INJECTION, SOLUTION INTRAVENOUS at 07:58

## 2018-12-19 RX ADMIN — IOPAMIDOL 95 ML: 755 INJECTION, SOLUTION INTRAVENOUS at 10:04

## 2018-12-19 NOTE — PAT
T&s too early so please do in preop dos with blood permit.         a1c done 12/6/18 so not repeated since good for month.      Cbc and cmp done 12/18/18 so not repeated either.       ekg done 12/13/18 on chart.     eras information given to patient and explained.       Gi nurse notified of surgery at this time.

## 2018-12-19 NOTE — NURSING NOTE
Pt discharged from IR s/p ct chest abd and pelvis with and without contrast with renal protocol.  Pt tolerated fluids without complications, pt tolerating po fluids but requires encouragement as he complains of nausea.  Continuous TPN infusing via implanted port throughout visit today remains intact.  Instructed patient and family re: importance of increased fluid intake for renal protection of next 48 hours s/p iv contrast.  Pt transported via family per wheelchair to exit to Dr. Hale's office for follow up visit.

## 2018-12-19 NOTE — PROGRESS NOTES
Patient had his CT scan with contrast today with pre/post hydration. Patient came to see Dr. Brody in his office. Patient ambulatory with walker and family present. Dr. Brody talked with patient and patient agrees to move on with Ruby on 12/31/2018. Patient/spouse knows that they can call me if needed and they have my contact number. Patient has TPN infusing. Spouse tells me that patient has been getting PT. AG

## 2018-12-20 LAB — CANCER AG19-9 SERPL-ACNC: 1349 U/ML (ref 0–35)

## 2018-12-24 ENCOUNTER — READMISSION MANAGEMENT (OUTPATIENT)
Dept: CALL CENTER | Facility: HOSPITAL | Age: 68
End: 2018-12-24

## 2018-12-24 ENCOUNTER — APPOINTMENT (OUTPATIENT)
Dept: CT IMAGING | Facility: HOSPITAL | Age: 68
End: 2018-12-24
Attending: SURGERY

## 2018-12-24 NOTE — OUTREACH NOTE
Sepsis Week 2 Survey      Responses   Facility patient discharged from?  [facility]   Does the patient have one of the following disease processes/diagnoses(primary or secondary)?  Sepsis   Week 2 attempt successful?  Yes   Call start time  1057   Call end time  1108   General alerts for this patient  Pancreatic CA   Discharge diagnosis  sepis   Is patient permission given to speak with other caregiver?  Yes   Person spoke with today (if not patient) and relationship  Wife   Meds reviewed with patient/caregiver?  Yes   Is the patient having any side effects they believe may be caused by any medication additions or changes?  No   Does the patient have all medications related to this admission filled (includes all antibiotics, inhalers, nebulizers,steroids,etc.)  Yes   Is the patient taking all medications as directed (includes completed medication regime)?  Yes   Comments regarding appointments  Seen by Dr Jackman on 12/18 and again on 12/26   Does the patient have a primary care provider?   Yes   Comments regarding PCP  PCP Dr Denney- on 12/26/2018   Does the patient have an appointment with their PCP within 7 days of discharge?  Yes   Has the patient kept scheduled appointments due by today?  Yes   What is the Home health agency?   [facility]   Has home health visited the patient within 72 hours of discharge?  Yes   Home health comments  [facility] home infusion provides Lipids.    Psychosocial issues?  No   Did the patient receive a copy of their discharge instructions?  Yes   Nursing interventions  Reviewed instructions with patient   What is the patient's perception of their health status since discharge?  Improving   Nursing interventions  Nurse provided patient education   Is the patient/caregiver able to teach back Sepsis?  P - Pale or discolored skin, S - Shivering,fever or very cold, S - Short of breath   Nursing interventions  Nurse provided reassurance to patient   Is patient/caregiver able to teach  back steps to recovery at home?  Rest and regain strength, Eat a balanced diet, Set small, achievable goals for return to baseline health   Is the patient/caregiver able to teach back signs and symptoms of worsening condition:  Fever, Shortness of breath/rapid respiratory rate, Altered mental status(confusion/coma), Rapid heart rate (>90)   Is the patient/caregiver able to teach back the hierarchy of who to call/visit for symptoms/problems? PCP, Specialist, Home health nurse, Urgent Care, ED, 911  Yes   Week 2 call completed?  Yes          Viral Alejandra RN

## 2018-12-26 ENCOUNTER — OFFICE VISIT (OUTPATIENT)
Dept: CARDIOLOGY | Facility: CLINIC | Age: 68
End: 2018-12-26

## 2018-12-26 ENCOUNTER — LAB (OUTPATIENT)
Dept: ONCOLOGY | Facility: CLINIC | Age: 68
End: 2018-12-26

## 2018-12-26 ENCOUNTER — OFFICE VISIT (OUTPATIENT)
Dept: ONCOLOGY | Facility: CLINIC | Age: 68
End: 2018-12-26

## 2018-12-26 VITALS
HEART RATE: 100 BPM | SYSTOLIC BLOOD PRESSURE: 124 MMHG | WEIGHT: 201.8 LBS | TEMPERATURE: 98.7 F | RESPIRATION RATE: 18 BRPM | DIASTOLIC BLOOD PRESSURE: 84 MMHG | OXYGEN SATURATION: 99 % | BODY MASS INDEX: 27.37 KG/M2

## 2018-12-26 VITALS
BODY MASS INDEX: 27.22 KG/M2 | SYSTOLIC BLOOD PRESSURE: 120 MMHG | HEART RATE: 98 BPM | OXYGEN SATURATION: 97 % | RESPIRATION RATE: 20 BRPM | DIASTOLIC BLOOD PRESSURE: 86 MMHG | WEIGHT: 201 LBS | HEIGHT: 72 IN

## 2018-12-26 DIAGNOSIS — C25.0 MALIGNANT NEOPLASM OF HEAD OF PANCREAS (HCC): ICD-10-CM

## 2018-12-26 DIAGNOSIS — M10.9 GOUT WITHOUT TOPHUS: ICD-10-CM

## 2018-12-26 DIAGNOSIS — Z79.01 CHRONIC ANTICOAGULATION: ICD-10-CM

## 2018-12-26 DIAGNOSIS — E86.0 DEHYDRATION: ICD-10-CM

## 2018-12-26 DIAGNOSIS — R11.0 NAUSEA: ICD-10-CM

## 2018-12-26 DIAGNOSIS — I48.91 ATRIAL FIBRILLATION, UNSPECIFIED TYPE (HCC): Primary | ICD-10-CM

## 2018-12-26 DIAGNOSIS — G89.3 NEOPLASM RELATED PAIN: ICD-10-CM

## 2018-12-26 DIAGNOSIS — E78.2 MIXED HYPERLIPIDEMIA: ICD-10-CM

## 2018-12-26 DIAGNOSIS — K59.00 CONSTIPATION, UNSPECIFIED CONSTIPATION TYPE: ICD-10-CM

## 2018-12-26 DIAGNOSIS — I10 ESSENTIAL HYPERTENSION: ICD-10-CM

## 2018-12-26 DIAGNOSIS — E63.9 POOR NUTRITION: ICD-10-CM

## 2018-12-26 DIAGNOSIS — C25.0 MALIGNANT NEOPLASM OF HEAD OF PANCREAS (HCC): Primary | ICD-10-CM

## 2018-12-26 DIAGNOSIS — I48.91 ATRIAL FIBRILLATION, UNSPECIFIED TYPE (HCC): ICD-10-CM

## 2018-12-26 LAB
ALBUMIN SERPL-MCNC: 4.1 G/DL (ref 3.4–4.8)
ALBUMIN/GLOB SERPL: 1.2 G/DL (ref 1.5–2.5)
ALP SERPL-CCNC: 101 U/L (ref 40–129)
ALT SERPL W P-5'-P-CCNC: 12 U/L (ref 10–44)
ANION GAP SERPL CALCULATED.3IONS-SCNC: 8.2 MMOL/L (ref 3.6–11.2)
AST SERPL-CCNC: 18 U/L (ref 10–34)
BASOPHILS # BLD AUTO: 0.02 10*3/MM3 (ref 0–0.3)
BASOPHILS NFR BLD AUTO: 0.3 % (ref 0–2)
BILIRUB SERPL-MCNC: 0.5 MG/DL (ref 0.2–1.8)
BUN BLD-MCNC: 22 MG/DL (ref 7–21)
BUN/CREAT SERPL: 17.7 (ref 7–25)
CALCIUM SPEC-SCNC: 9.7 MG/DL (ref 7.7–10)
CHLORIDE SERPL-SCNC: 100 MMOL/L (ref 99–112)
CO2 SERPL-SCNC: 26.8 MMOL/L (ref 24.3–31.9)
CREAT BLD-MCNC: 1.24 MG/DL (ref 0.43–1.29)
DEPRECATED RDW RBC AUTO: 54.2 FL (ref 37–54)
EOSINOPHIL # BLD AUTO: 0.64 10*3/MM3 (ref 0–0.7)
EOSINOPHIL NFR BLD AUTO: 9.7 % (ref 0–7)
ERYTHROCYTE [DISTWIDTH] IN BLOOD BY AUTOMATED COUNT: 16.5 % (ref 11.5–14.5)
GFR SERPL CREATININE-BSD FRML MDRD: 58 ML/MIN/1.73
GLOBULIN UR ELPH-MCNC: 3.3 GM/DL
GLUCOSE BLD-MCNC: 144 MG/DL (ref 70–110)
HCT VFR BLD AUTO: 37.3 % (ref 42–52)
HGB BLD-MCNC: 12 G/DL (ref 14–18)
IMM GRANULOCYTES # BLD AUTO: 0.06 10*3/MM3 (ref 0–0.03)
IMM GRANULOCYTES NFR BLD AUTO: 0.9 % (ref 0–0.5)
LYMPHOCYTES # BLD AUTO: 1.2 10*3/MM3 (ref 1–3)
LYMPHOCYTES NFR BLD AUTO: 18.2 % (ref 16–46)
MCH RBC QN AUTO: 29.6 PG (ref 27–33)
MCHC RBC AUTO-ENTMCNC: 32.2 G/DL (ref 33–37)
MCV RBC AUTO: 92.1 FL (ref 80–94)
MONOCYTES # BLD AUTO: 0.44 10*3/MM3 (ref 0.1–0.9)
MONOCYTES NFR BLD AUTO: 6.7 % (ref 0–12)
NEUTROPHILS # BLD AUTO: 4.23 10*3/MM3 (ref 1.4–6.5)
NEUTROPHILS NFR BLD AUTO: 64.2 % (ref 40–75)
OSMOLALITY SERPL CALC.SUM OF ELEC: 276 MOSM/KG (ref 273–305)
PLATELET # BLD AUTO: 254 10*3/MM3 (ref 130–400)
PMV BLD AUTO: 10.3 FL (ref 6–10)
POTASSIUM BLD-SCNC: 4.4 MMOL/L (ref 3.5–5.3)
PROT SERPL-MCNC: 7.4 G/DL (ref 6–8)
RBC # BLD AUTO: 4.05 10*6/MM3 (ref 4.7–6.1)
SODIUM BLD-SCNC: 135 MMOL/L (ref 135–153)
WBC NRBC COR # BLD: 6.59 10*3/MM3 (ref 4.5–12.5)

## 2018-12-26 PROCEDURE — 80053 COMPREHEN METABOLIC PANEL: CPT | Performed by: NURSE PRACTITIONER

## 2018-12-26 PROCEDURE — 99214 OFFICE O/P EST MOD 30 MIN: CPT | Performed by: NURSE PRACTITIONER

## 2018-12-26 PROCEDURE — 99213 OFFICE O/P EST LOW 20 MIN: CPT | Performed by: INTERNAL MEDICINE

## 2018-12-26 PROCEDURE — 85025 COMPLETE CBC W/AUTO DIFF WBC: CPT | Performed by: NURSE PRACTITIONER

## 2018-12-26 RX ORDER — CEFDINIR 300 MG/1
300 CAPSULE ORAL EVERY 12 HOURS SCHEDULED
Qty: 20 CAPSULE | Refills: 0 | Status: SHIPPED | OUTPATIENT
Start: 2018-12-26 | End: 2019-01-15 | Stop reason: HOSPADM

## 2018-12-26 RX ORDER — ALLOPURINOL 100 MG/1
100 TABLET ORAL DAILY
Qty: 90 TABLET | Refills: 0 | Status: SHIPPED | OUTPATIENT
Start: 2018-12-26

## 2018-12-26 NOTE — PROGRESS NOTES
DATE : 12/26/18    DIAGNOSIS:   1.  Pancreatic cancer    2.  Repeated episodes of bacteremia due to cholangitis -    3.  Refractory nausea/vomiting    CHIEF COMPLAINT:  Follow up of pancreatic cancer    TREATMENT:  1.       HISTORY OF PRESENT ILLNESS:   Todd Phillips is a very pleasant 68 y.o. male who is being seen today at the request of Dr. Miquel Brody for evaluation and treatment of pancreatic cancer.  Mr. Phillips initially developed symptoms in January of 2018. At that time he had pain in his upper abdomen which would radiate to his back.  In July, the pain intensified.  He was seen in the ER and also by Dr. Su.  He underwent RUQ U/S and then HIDA scan and it was determined he had a dysfunctional gallbladder.  He saw Dr. Downs and had cholecystectomy on 8-10-18.  Pathology showed chronic cholecystitis and an incidental benign lymph node.  He re-presented in the post-operative period with jaundice.  Dr. Su performed ERCP on8-14-18.  He was noted to have a 3 cm irregular tight stricture of the distal common bile duct with proximal biliary ductal dilatation.  Biliary papillotomy and stricture dilatation performed and brushings taken.  A 10 Fr x 5 cm biliary stent was placed.  Brushings were taken but were negative.  There was sl dilatation of the distal pancreatic duct without discrete stricture.   He was discharged with improving jaundice on 8-16.  On 8-21 he represented with sepsis due to Klebsiella pneumonia bacteremia and was admitted.  He then was referred to Dr. Brody for evaluation and treatment.  He has been set up for EUS with biopsy next Friday for what appears to be a primary pancreatic malignancy in the head of the pancreas.  Dr. Brody has referred him for consideration of neoadjuvant therapy.    INTERVAL HISTORY:  Mr. Phillips is here today with his wife for follow up of pancreatic cancer. Since his last visit, he reports he has been doing well. He followed up with Dr. Hale last week  and surgery has been scheduled for 12/31/18. He and his wife are very anxious and excited to proceed with surgery. His nausea is currently well controlled with zofran and phenergan scheduled. He is also taking Marinol BID. He reports pain is controlled with MS Contin 30 mg BID. He also has oxycodone prn but rarely has to use this. Denies constipation. He continues to receive TPN per recommendations of Dr. Hale. He says he has been drinking more water, approximately 5-6 bottles of water/day. He also tries to eat small frequent meals as he can. He reports having some nasal drainage and has been taking Claritin. He has also been working with physical therapy at home which has been helpful and is slowly regaining some strength. Although he continues to have fatigue and weakness. He denies any other complaints today.     PAST MEDICAL HISTORY:  Past Medical History:   Diagnosis Date   • Antral gastritis    • Asthma    • Atrial fibrillation (CMS/[facility])     treated with oral blood thinner   • Chest pain    • COPD (chronic obstructive pulmonary disease) (CMS/[facility])    • Coronary artery disease     no stents   • Diabetes mellitus (CMS/[facility])     type 2 - checks sugar 4 times per day    • Duodenitis    • Elevated cholesterol    • Emphysema of lung (CMS/[facility])    • Gallbladder disease     removed    • GERD (gastroesophageal reflux disease)    • Hard to intubate     busted lip last time   • Hyperlipidemia    • Hypertension    • Jaundice    • Kidney stone    • Kidney stones     had lithotripsy and passed 36 kidney stones as well as had one surgically removed.   • Malignant neoplasm of head of pancreas (CMS/[facility]) 9/5/2018   • Nausea    • Obstructive sleep apnea on CPAP     compliant with machine    • Palpitations    • Pneumonia 08/2018   • Reflux esophagitis    • Renal failure     stage 3 kidney disease   • Sepsis (CMS/[facility])        PAST SURGICAL HISTORY:  Past Surgical History:   Procedure Laterality Date   • BILE DUCT STENT PLACEMENT       [facility] 2 weeks ago    • CARDIAC CATHETERIZATION  11/01/1999   • CARDIOVASCULAR STRESS TEST  09/2012   • CHOLECYSTECTOMY N/A 8/10/2018    Procedure: CHOLECYSTECTOMY LAPAROSCOPIC;  Surgeon: Ronak Downs MD;  Location: [facility] OR;  Service: General   • COLONOSCOPY     • CYSTOSCOPY BLADDER STONE LITHOTRIPSY     • ECHO - CONVERTED  09/2012   • ERCP N/A 8/14/2018    Procedure: ENDOSCOPIC RETROGRADE CHOLANGIOPANCREATOGRAPHY WITH PAPILLOTOMY;  Surgeon: Scot Su MD;  Location: [facility] OR;  Service: Gastroenterology   • ERCP N/A 10/1/2018    Procedure: ENDOSCOPIC RETROGRADE CHOLANGIOPANCREATOGRAPHY;  Surgeon: Jefry Luna MD;  Location: [facility] ENDOSCOPY;  Service: Gastroenterology   • KIDNEY STONE SURGERY     • KIDNEY STONE SURGERY      open abdominal surgery   • PORTACATH PLACEMENT Right 10/23/2018    Procedure: INSERTION OF PORTACATH;  Surgeon: Ronak Downs MD;  Location: [facility] OR;  Service: General   • TONSILLECTOMY     • UPPER GASTROINTESTINAL ENDOSCOPY  08/30/2012       FAMILY HISTORY:  Family History   Problem Relation Age of Onset   • Heart attack Mother    • Heart disease Mother    • Stroke Mother    • Kidney disease Mother    • Heart failure Mother    • Lung disease Father    • Tuberculosis Father    • Diabetes Sister    • Heart attack Brother    • Heart failure Brother    • Heart disease Brother    • Diabetes Sister    • No Known Problems Brother    • No Known Problems Brother        SOCIAL HISTORY:  Social History     Socioeconomic History   • Marital status:      Spouse name: Not on file   • Number of children: Not on file   • Years of education: Not on file   • Highest education level: Not on file   Social Needs   • Financial resource strain: Not on file   • Food insecurity - worry: Not on file   • Food insecurity - inability: Not on file   • Transportation needs - medical: Not on file   • Transportation needs - non-medical: Not on file    Occupational History   • Not on file   Tobacco Use   • Smoking status: Never Smoker   • Smokeless tobacco: Never Used   Substance and Sexual Activity   • Alcohol use: No   • Drug use: No   • Sexual activity: Defer     Partners: Female   Other Topics Concern   • Not on file   Social History Narrative    He is  and lives alone with his wife although they have 3 children together who all live close by. He is retired from the [employer] and was exposed to Agent Orange during [place]. He is a lifelong nonsmoker.         A DIL  of cancer.    REVIEW OF SYSTEMS:   A comprehensive 14 point review of systems was performed.  Significant findings as mentioned above.  All other systems reviewed and are negative.      MEDICATIONS:  The current medication list was reviewed in the EMR    Current Outpatient Medications:   •  albuterol (PROVENTIL HFA;VENTOLIN HFA) 108 (90 BASE) MCG/ACT inhaler, Inhale 2 puffs Every 4 (Four) Hours As Needed for Wheezing or Shortness of Air., Disp: , Rfl:   •  albuterol (PROVENTIL) (2.5 MG/3ML) 0.083% nebulizer solution, Take 2.5 mg by nebulization 2 (Two) Times a Day As Needed for Wheezing or Shortness of Air., Disp: , Rfl:   •  allopurinol (ZYLOPRIM) 100 MG tablet, Take 1 tablet by mouth Daily., Disp: 90 tablet, Rfl: 0  •  apixaban (ELIQUIS) 5 MG tablet tablet, Take 1 tablet by mouth Every 12 (Twelve) Hours. (Patient taking differently: Take 5 mg by mouth Every 12 (Twelve) Hours. Not instructed to stop pt states), Disp: 60 tablet, Rfl: 0  •  cefdinir (OMNICEF) 300 MG capsule, Take 1 capsule by mouth Every 12 (Twelve) Hours for 10 days., Disp: 20 capsule, Rfl: 0  •  cholecalciferol (VITAMIN D3) 1000 units tablet, Take 1,000 Units by mouth Daily., Disp: , Rfl:   •  colchicine 0.6 MG tablet, Take 1 tablet by mouth Daily., Disp: 90 tablet, Rfl: 2  •  dronabinol (MARINOL) 5 MG capsule, Take 1 capsule by mouth 2 (Two) Times a Day Before Meals., Disp: 60 capsule, Rfl: 2  •  flecainide (TAMBOCOR)  50 MG tablet, Take 1 tablet by mouth Every 12 (Twelve) Hours., Disp: 60 tablet, Rfl: 0  •  FLUoxetine (PROzac) 20 MG capsule, Take 20 mg by mouth 2 (Two) Times a Day., Disp: , Rfl:   •  insulin lispro (humaLOG) 100 UNIT/ML injection, Inject  under the skin into the appropriate area as directed 3 (Three) Times a Day Before Meals., Disp: , Rfl:   •  lansoprazole (PREVACID) 30 MG capsule, Take 1 capsule by mouth twice daily (With Breakfast & Dinner)., Disp: 60 capsule, Rfl: 0  •  linaclotide (LINZESS) 145 MCG capsule capsule, Take 1 capsule by mouth Every Morning Before Breakfast., Disp: 30 capsule, Rfl: 3  •  LORazepam (ATIVAN) 0.5 MG tablet, Take one to two tablets by mouth every 4 to 6 hours for nausea. (Patient taking differently: Take 0.5 mg by mouth Every 6 (Six) Hours As Needed for Anxiety. Take one to two tablets by mouth every 4 to 6 hours for nausea.), Disp: 120 tablet, Rfl: 0  •  metroNIDAZOLE (FLAGYL) 500 MG tablet, Take 1 tablet by mouth Every 12 (Twelve) Hours for 10 days., Disp: 20 tablet, Rfl: 0  •  mometasone (ASMANEX) 220 MCG/INH inhaler, Inhale 2 puffs Every Night., Disp: , Rfl:   •  montelukast (SINGULAIR) 10 MG tablet, Take 10 mg by mouth Daily., Disp: , Rfl:   •  Morphine (MS CONTIN) 30 MG 12 hr tablet, Take 1 tablet by mouth Every 12 (Twelve) Hours., Disp: 60 tablet, Rfl: 0  •  nitroglycerin (NITROSTAT) 0.4 MG SL tablet, Place 0.4 mg under the tongue as needed for chest pain., Disp: , Rfl:   •  ondansetron (ZOFRAN) 8 MG tablet, Take 1 tablet by mouth Every 8 (Eight) Hours As Needed for Nausea or Vomiting., Disp: 60 tablet, Rfl: 3  •  oxyCODONE (ROXICODONE) 5 MG immediate release tablet, Take 5 mg by mouth Every 4 (Four) Hours As Needed for Moderate Pain ., Disp: , Rfl:   •  polyethylene glycol (MIRALAX) packet, Take 17 g by mouth Daily As Needed., Disp: , Rfl:   •  promethazine (PHENERGAN) 12.5 MG tablet, Take 1 tablet by mouth Every 6 (Six) Hours As Needed for Nausea or Vomiting., Disp: 90  tablet, Rfl: 3  •  Tiotropium Bromide-Olodaterol 2.5-2.5 MCG/ACT aerosol solution, Inhale 2 puffs Daily., Disp: , Rfl:   No current facility-administered medications for this visit.     Facility-Administered Medications Ordered in Other Visits:   •  sodium chloride 0.9 % infusion  - ADS Override Pull, , , ,   •  sodium chloride 0.9 % infusion  - ADS Override Pull, , , ,     ALLERGIES:    Allergies   Allergen Reactions   • Contrast Dye Other (See Comments)     Can't have due to kidney failure per family   • Chlorhexidine Hives and Rash     PHYSICAL EXAM:  Vitals:    12/26/18 1303   BP: 124/84   Pulse: 100   Resp: 18   Temp: 98.7 °F (37.1 °C)   SpO2: 99%   General:  Awake, alert and oriented. Appears to be feeling well today, in good spirits.   HEENT:  Pupils are equal, round and reactive to light and accommodation, Extra-ocular movements full, Oropharyx clear, mmm   Neck:  No JVD, thyromegaly or lymphadenopathy  CV:  S1,S2 no murmurs, rubs or gallops  Resp:  Lungs are clear to auscultation bilaterally  Abd:  Soft, sl tender to palpation bi over the epigastric and RUQ areas, non-distended, bowel sounds normal, no organomegaly or masses.  Surgical incisions well-healed.  Ext:  No clubbing, cyanosis or edema  Lymph:  No cervical, supraclavicular, axillary,adenopathy  Neuro: grossly non-focal exam    PATHOLOGY:  08-15-18         ENDOSCOPY:  08-14-18 ERCP with papillotomy, balloon rotation of the biliary stricture, pathology brushings, ileum stent placement ([place])  1.  Irregular 3 cm distal common bile duct stricture concerning for neoplastic disease. Biliary papillotomy, stricture dilatation by  through the scope balloon, cytology brushings performed and 10 Yemeni by 5 cm biliary stent placed.    2.  Dilated common hepatic duct to 15 mm when fully contrast filled.  Dilated intrahepatic biliary tree.    3.  No stones proximal to the stricture were identified.    4.  Slight dilatation of the distal pancreatic duct  without a discrete stricture identified.    IMAGIN18 CT Abdomen Pelvis Stone Protocol   Findings  - LOWER THORAX: Clear. No effusions.  - LIVER: Homogeneous. No focal hepatic mass or ductal dilatation.  - GALLBLADDER: MINIMAL STRANDING AROUND REGION OF GALLBLADDER FOSSA  THAT MAY BE POSTSURGICAL.  - PANCREAS: Unremarkable. No mass or ductal dilatation.  - SPLEEN: Homogeneous. No splenomegaly.  - ADRENALS: No mass.  - KIDNEYS: No mass. No obstructive uropathy.  No evidence of urolithiasis.  - GI TRACT: SMALL HIATAL HERNIA.  - PERITONEUM: No free air. No free fluid or loculated fluid collections.  - MESENTERY: Unremarkable.  - LYMPH NODES: No lymphadenopathy.  - VASCULATURE: ATHEROSCLEROTIC VASCULAR CALCIFICATION.  - ABDOMINAL WALL: No focal hernia or mass.  - OTHER: None.  - BLADDER: No focal mass or significant wall thickening  - REPRODUCTIVE: Unremarkable as visualized.  - APPENDIX: Nondistended. No surrounding inflammation.  - BONES: No acute bony abnormality.     IMPRESSION:  - Minimal stranding around region of gallbladder fossa that may be postsurgical.     18 US Liver   FINDINGS:   - Visualized pancreas is unremarkable.   - The gallbladder is absent. No collection identified.   - The CBD measures 10.79615349516 mm    .  - The liver demonstrates normal echogenicity without focal lesion.    - No ascites demonstrated.        IMPRESSION:  - As above.    18 CT Abdomen Pelvis Stone Protocol   FINDINGS:   - Minimal bibasilar atelectasis.  - Hiatal hernia.  - The liver is homogeneous. There is no evidence of focal hepatic mass.  - The spleen is homogeneous.  - Stent is noted traversing the common bile duct.  - 3 mm nodule in the right lung on image 8 of the axial series.  - There is no adrenal enlargement.  - The kidneys show no evidence of hydronephrosis or hydroureter. I do not see any distal ureteral stones.   - Otherwise I do not see any free fluid or walled off fluid collections.  - There is  moderate to large volume stool in the colon.  - There is no evidence of mesenteric or retroperitoneal adenopathy.     IMPRESSION:  1. Tiny nodule in the right lung base.  2. Minimal bibasilar atelectasis.  3. Moderate to large volume stool in the colon.     Other findings as above.    08-22-18 CT Abdomen Pelvis With Contrast   FINDINGS:   - Tiny left basilar nodule measuring 4 mm on image 14 of the axial series.  - Minimal bibasilar atelectasis.  - The liver is homogeneous. There is no evidence of focal hepatic mass  - The spleen is homogeneous  - Indistinct appearance of the pancreatic head. A biliary stent is present.  - Small left adrenal nodule is present.  - The kidneys show no evidence of hydronephrosis or hydroureter. I do not see any distal ureteral stones.   - Otherwise I do not see any free fluid or walled off fluid collections.  - Large volume stool is present in the colon.     IMPRESSION:  1. Slightly enlarged, indistinct appearance of the pancreatic head.  - Underlying neoplasm certainly not excluded but no delineable mass is present. There is a biliary stent.    2. Tiny nodule in the left lung base.    3. Small left adrenal nodule.    4. Hiatal hernia.    5. Moderate to large volume stool in the colon.      09-11-18 CT Chest Without Contrast   FINDINGS:   - Minimal coronary artery calcifications present.  - No pericardial or pleural effusions.  - No parenchymal soft tissue nodules or masses. There are findings of COPD.     IMPRESSION:  - COPD.  - Hiatal hernia.  - Minimal coronary artery calcification.       09-11-18 CT Abdomen Pelvis Without Contrast   FINDINGS:   - Lung bases show mild increased interstitial markings.  - There is a hiatal hernia.  - The liver is homogeneous. There is no evidence of focal hepatic mass.  - The spleen is homogeneous.  - Mildly indistinct appearance of the pancreas. There is a biliary stent present. I cannot exclude mild degree of pancreatitis..  - There is no adrenal  enlargement.  - The kidneys show no evidence of hydronephrosis or hydroureter. I do not see any distal ureteral stones.   - Otherwise I do not see any free fluid or walled off fluid collections.  - Large volume stool is present in the colon.  - Urinary bladder wall is slightly thickened.  - There is no evidence of mesenteric or retroperitoneal adenopathy.    IMPRESSION:  1. Mildly indistinct appearance of the proximal pancreas.  2. Urinary bladder wall thickening.  3. Large volume stool in the colon.    09-14-18 CT Abdomen Without Contrast   FINDINGS:   - Again noted is very mild indistinct appearance of the pancreatic head. Stent is stable in position.  - The liver is stable and homogeneous.  - Spleen is homogeneous.  - No adrenal enlargement.  - Moderate to large volume stool in the colon.     IMPRESSION:  - No significant interval change since the previous exam.     09-21-18 NM Pet Skull Base To Mid Thigh   FINDINGS:      HEAD/NECK:  - No FDG hypermetabolic neck adenopathy.  - No FDG hypermetabolic masses.     CHEST:   - No FDG hypermetabolic thoracic adenopathy.  - No FDG hypermetabolic lung nodules or masses.  - Evaluation for tiny parenchymal nodules is somewhat limited on low dose CT secondary to respiratory motion.     ABDOMEN/PELVIS:   - There is hypermetabolic activity in the pancreatic head with a maximum SUV of 4.971.  - Physiologic FDG hypermetabolism seen throughout the GI tract.  - Physiologic FDG hypermetabolism seen throughout the mesentery, retroperitoneum and pelvis.     BONES:   - There are scattered foci of FDG hypermetabolism most suggestive of degenerative change seen throughout the axial skeleton. If concern persist for metastatic lesions of the bone, HDP Bone scan is recommended for further evaluation.     IMPRESSION:  - Abnormal hypermetabolic activity corresponding to area of indistinctness in the pancreatic head. Maximum SUV is 4.97.    RECENT LABS:  Lab Results   Component Value Date     WBC 6.59 12/26/2018    HGB 12.0 (L) 12/26/2018    HCT 37.3 (L) 12/26/2018    MCV 92.1 12/26/2018    RDW 16.5 (H) 12/26/2018     12/26/2018    NEUTRORELPCT 64.2 12/26/2018    LYMPHORELPCT 18.2 12/26/2018    MONORELPCT 6.7 12/26/2018    EOSRELPCT 9.7 (H) 12/26/2018    BASORELPCT 0.3 12/26/2018    NEUTROABS 4.23 12/26/2018    LYMPHSABS 1.20 12/26/2018       Lab Results   Component Value Date     12/26/2018    K 4.4 12/26/2018    CO2 26.8 12/26/2018     12/26/2018    BUN 22 (H) 12/26/2018    CREATININE 1.24 12/26/2018    EGFRIFNONA 58 (L) 12/26/2018    EGFRIFAFRI  09/02/2016      Comment:      <15 Indicative of kidney failure.    GLUCOSE 144 (H) 12/26/2018    CALCIUM 9.7 12/26/2018    ALKPHOS 101 12/26/2018    AST 18 12/26/2018    ALT 12 12/26/2018    BILITOT 0.5 12/26/2018    ALBUMIN 4.10 12/26/2018    PROTEINTOT 7.4 12/26/2018    MG 2.1 12/12/2018    PHOS 4.4 12/11/2018       Lab Results   Component Value Date/Time    URICACID 7.5 (H) 10/16/2018 12:18 PM     Lab Results   Component Value Date     1349 (H) 12/19/2018     1089 (H) 12/10/2018     1576 (H) 11/13/2018     5149 (H) 10/19/2018     7602 (H) 09/25/2018     3636 (H) 09/07/2018     4032 (H) 08/15/2018     ASSESSMENT & PLAN:  Todd Phillips is a very pleasant 68 y.o. male with presumed cancer of the head of the pancreas with a malignant appearing stricture in the CBD.    1.  Pancreatic cancer:  -  Imaging shows vague abnormality in the head of the pancreas which is hypermetabolic on PET-CT.  Biopsy was c/w pancreatic cancer and Ca19-9 is markedly elevated.    -  Biliary stent was exchanged for metal stent to relieve obstruction / cholangitis.   -Recommended neoadjuvant chemotherapy with Gemcitabine and Abraxane and he received one cycle. He has had significant improvement in CA 19-9 as above.   - Unfortunately, he has been struggling with refractory nausea/vomiting and dehydration. As a result, he has very poor  nutrition and his symptoms have made it very difficult to treat him. Dr. Alves called and d/w Dr. Hale who  recommended we start him on TPN which has been very helpful. Unfortunately, he recently developed fever and was admitted to hospital with sepsis, etiology unknown. He was discharged home on Omnicef and Flagyl per ID (now completed).   -We have been following him closely and providing him with supportive care and he has slowly recovered. He followed up with  last week and surgery has been scheduled for 12/31/18.   -Clinically he is doing well today and labs/exam without cause for concern.   -Will plan to follow up in 3-4 weeks after his surgery.    2.  Nausea/Dehydration/Poor nutrition:  -He had been using Sancuso patch and Compazine and Ativan 0.5 mg (1-2 tabs) scheduled every 4 hours along with Marinol 5 mg BID but continued to have nausea. This was changed to scheduled zofran and phenergan during recent hospitalization and this has been working better for him. Therefore, discontinued Sancuso patch and continued scheduled zofran and phenergan. Also continued Marinol BID and Ativan prn. He currently denies nausea.   -Will continueTPN per Dr. Hale's recommendations which has been very helpful.   -He has been struggling with oral hydration and we have been giving IVF as needed. Now that nausea is better controlled, he has increased water intake, drinking approximately 5-6 bottles of water/day. Cr normalized today. He knows to continue with oral hydration.  Will monitor.     3.  Neoplasm related pain:  -Currently well controlled with MSContin 30 mg BID. He also has Oxycodone 5 mg 1-3 tabs q 4h prn (which he rarely takes (maybe 1-2 times/week).     4. Constipation: Currently denies. Continue Senna/ Colace ii BID with Miralax as needed.      5.   History of Atrial fibrillation:  Chronically anticoagulated on Eliquis.     6.  Prophylaxis:  Has had 2018 influenza vaccine.      7.  ACO Quality  measures  - Todd Phillips does not use tobacco products.  - Patient's Body mass index is 27.37 kg/m². BMI is above normal parameters. Recommendations include: none given current problems above. .  - Current outpatient and discharge medications have been reconciled for the patient.  Reviewed by:VIMAL Nielson      I spent 25 minutes with Todd Phillips today.  More than 50% of the time was spent in counseling / coordination of care for the above problems.      Electronically Signed by: VIMAL Rolle      CC:   MD Miquel Quintana MD Aaron House, MD Michael Simons, MD

## 2018-12-26 NOTE — PROGRESS NOTES
subjective     Chief Complaint   Patient presents with   • Atrial Fibrillation   • Essential hypertension   • Med Refill     Pending   • Follow-up     History of Present Illness  Riki is 68 years old white male who is here for follow-up.  Patient has history of pancreatic tumor.  At this point he seems to be doing very well.  He has paroxysmal atrial fibrillation.  He is taking Tambocor 50 twice a day and eliquis.  Patient also has history of hyperuricemia on allopurinol and colchicine on when necessary basis.    Patient wife says that he has been having lot of coughing and yellowish sputum without fever.  She wishes to have an antibiotics.    Past Surgical History:   Procedure Laterality Date   • BILE DUCT STENT PLACEMENT      [facility] 2 weeks ago    • CARDIAC CATHETERIZATION  11/01/1999   • CARDIOVASCULAR STRESS TEST  09/2012   • CHOLECYSTECTOMY N/A 8/10/2018    Procedure: CHOLECYSTECTOMY LAPAROSCOPIC;  Surgeon: Ronak Downs MD;  Location: [facility] OR;  Service: General   • COLONOSCOPY     • CYSTOSCOPY BLADDER STONE LITHOTRIPSY     • ECHO - CONVERTED  09/2012   • ERCP N/A 8/14/2018    Procedure: ENDOSCOPIC RETROGRADE CHOLANGIOPANCREATOGRAPHY WITH PAPILLOTOMY;  Surgeon: Scot Su MD;  Location: [facility] OR;  Service: Gastroenterology   • ERCP N/A 10/1/2018    Procedure: ENDOSCOPIC RETROGRADE CHOLANGIOPANCREATOGRAPHY;  Surgeon: Jefry Luna MD;  Location: [facility] ENDOSCOPY;  Service: Gastroenterology   • KIDNEY STONE SURGERY     • KIDNEY STONE SURGERY      open abdominal surgery   • PORTACATH PLACEMENT Right 10/23/2018    Procedure: INSERTION OF PORTACATH;  Surgeon: Ronak Downs MD;  Location: [facility] OR;  Service: General   • TONSILLECTOMY     • UPPER GASTROINTESTINAL ENDOSCOPY  08/30/2012     Family History   Problem Relation Age of Onset   • Heart attack Mother    • Heart disease Mother    • Stroke Mother    • Kidney disease Mother    • Heart failure Mother    •  Lung disease Father    • Tuberculosis Father    • Diabetes Sister    • Heart attack Brother    • Heart failure Brother    • Heart disease Brother    • Diabetes Sister    • No Known Problems Brother    • No Known Problems Brother      Past Medical History:   Diagnosis Date   • Antral gastritis    • Asthma    • Atrial fibrillation (CMS/HCC)     treated with oral blood thinner   • Chest pain    • COPD (chronic obstructive pulmonary disease) (CMS/[facility]ly [facility])    • Coronary artery disease     no stents   • Diabetes mellitus (CMS/[facility]ly [facility])     type 2 - checks sugar 4 times per day    • Duodenitis    • Elevated cholesterol    • Emphysema of lung (CMS/Formerly [facility])    • Gallbladder disease     removed    • GERD (gastroesophageal reflux disease)    • Hard to intubate     busted lip last time   • Hyperlipidemia    • Hypertension    • Jaundice    • Kidney stone    • Kidney stones     had lithotripsy and passed 36 kidney stones as well as had one surgically removed.   • Malignant neoplasm of head of pancreas (CMS/[facility]ly [facility]) 9/5/2018   • Nausea    • Obstructive sleep apnea on CPAP     compliant with machine    • Palpitations    • Pneumonia 08/2018   • Reflux esophagitis    • Renal failure     stage 3 kidney disease   • Sepsis (CMS/[facility]ly [facility])      Patient Active Problem List   Diagnosis   • Hyperlipidemia   • COPD (chronic obstructive pulmonary disease) (CMS/[facility])   • Essential hypertension   • Gout without tophus   • Anxiety and depression   • Primary insomnia   • Gastroesophageal reflux disease without esophagitis   • Nonobstructive atherosclerosis of coronary artery   • CKD stage 3 secondary to diabetes (CMS/[facility])   • DM2 (diabetes mellitus, type 2) (CMS/[facility]ly [facility])   • Paroxysmal atrial fibrillation   • Chronic anticoagulation, eliquis   • Encounter for monitoring flecainide therapy   • Malignant neoplasm of head of pancreas (CMS/[facility])   • Bacteremia due to Escherichia coli   • Biliary obstruction   • Thrombocytopenia (CMS/[facility])   • Anemia due to chemotherapy   • Fever        Social History     Tobacco Use   • Smoking status: Never Smoker   • Smokeless tobacco: Never Used   Substance Use Topics   • Alcohol use: No   • Drug use: No       Allergies   Allergen Reactions   • Contrast Dye Other (See Comments)     Can't have due to kidney failure per family   • Chlorhexidine Hives and Rash       Current Outpatient Medications on File Prior to Visit   Medication Sig   • albuterol (PROVENTIL HFA;VENTOLIN HFA) 108 (90 BASE) MCG/ACT inhaler Inhale 2 puffs Every 4 (Four) Hours As Needed for Wheezing or Shortness of Air.   • albuterol (PROVENTIL) (2.5 MG/3ML) 0.083% nebulizer solution Take 2.5 mg by nebulization 2 (Two) Times a Day As Needed for Wheezing or Shortness of Air.   • apixaban (ELIQUIS) 5 MG tablet tablet Take 1 tablet by mouth Every 12 (Twelve) Hours. (Patient taking differently: Take 5 mg by mouth Every 12 (Twelve) Hours. Not instructed to stop pt states)   • cholecalciferol (VITAMIN D3) 1000 units tablet Take 1,000 Units by mouth Daily.   • colchicine 0.6 MG tablet Take 1 tablet by mouth Daily.   • dronabinol (MARINOL) 5 MG capsule Take 1 capsule by mouth 2 (Two) Times a Day Before Meals.   • flecainide (TAMBOCOR) 50 MG tablet Take 1 tablet by mouth Every 12 (Twelve) Hours.   • FLUoxetine (PROzac) 20 MG capsule Take 20 mg by mouth 2 (Two) Times a Day.   • insulin lispro (humaLOG) 100 UNIT/ML injection Inject  under the skin into the appropriate area as directed 3 (Three) Times a Day Before Meals.   • lansoprazole (PREVACID) 30 MG capsule Take 1 capsule by mouth twice daily (With Breakfast & Dinner).   • linaclotide (LINZESS) 145 MCG capsule capsule Take 1 capsule by mouth Every Morning Before Breakfast.   • LORazepam (ATIVAN) 0.5 MG tablet Take one to two tablets by mouth every 4 to 6 hours for nausea. (Patient taking differently: Take 0.5 mg by mouth Every 6 (Six) Hours As Needed for Anxiety. Take one to two tablets by mouth every 4 to 6 hours for nausea.)   • mometasone  (ASMANEX) 220 MCG/INH inhaler Inhale 2 puffs Every Night.   • montelukast (SINGULAIR) 10 MG tablet Take 10 mg by mouth Daily.   • Morphine (MS CONTIN) 30 MG 12 hr tablet Take 1 tablet by mouth Every 12 (Twelve) Hours.   • nitroglycerin (NITROSTAT) 0.4 MG SL tablet Place 0.4 mg under the tongue as needed for chest pain.   • ondansetron (ZOFRAN) 8 MG tablet Take 1 tablet by mouth Every 8 (Eight) Hours As Needed for Nausea or Vomiting.   • oxyCODONE (ROXICODONE) 5 MG immediate release tablet Take 5 mg by mouth Every 4 (Four) Hours As Needed for Moderate Pain .   • polyethylene glycol (MIRALAX) packet Take 17 g by mouth Daily As Needed.   • promethazine (PHENERGAN) 12.5 MG tablet Take 1 tablet by mouth Every 6 (Six) Hours As Needed for Nausea or Vomiting.   • Tiotropium Bromide-Olodaterol 2.5-2.5 MCG/ACT aerosol solution Inhale 2 puffs Daily.   • [DISCONTINUED] allopurinol (ZYLOPRIM) 100 MG tablet Take 100 mg by mouth Daily.   • metroNIDAZOLE (FLAGYL) 500 MG tablet Take 1 tablet by mouth Every 12 (Twelve) Hours for 10 days.   • [DISCONTINUED] cefdinir (OMNICEF) 300 MG capsule Take 1 capsule by mouth Every 12 (Twelve) Hours for 10 days.     Current Facility-Administered Medications on File Prior to Visit   Medication   • sodium chloride 0.9 % infusion  - ADS Override Pull   • sodium chloride 0.9 % infusion  - ADS Override Pull         The following portions of the patient's history were reviewed and updated as appropriate: allergies, current medications, past family history, past medical history, past social history, past surgical history and problem list.    Review of Systems   Constitution: Positive for weakness and malaise/fatigue.   HENT: Negative.  Negative for congestion.    Eyes: Negative.    Cardiovascular: Negative.  Negative for chest pain, cyanosis, dyspnea on exertion, irregular heartbeat, leg swelling, near-syncope, orthopnea, palpitations, paroxysmal nocturnal dyspnea and syncope.   Respiratory: Positive  "for cough. Negative for shortness of breath.    Hematologic/Lymphatic: Negative.    Skin: Negative.    Musculoskeletal: Negative.    Gastrointestinal: Positive for bloating, abdominal pain and nausea.   Neurological: Negative for headaches.          Objective:     /86 (BP Location: Left arm, Patient Position: Sitting, Cuff Size: Adult)   Pulse 98   Resp 20   Ht 182.9 cm (72\")   Wt 91.2 kg (201 lb)   SpO2 97%   BMI 27.26 kg/m²   Physical Exam   Constitutional: He appears well-developed and well-nourished. No distress.   HENT:   Head: Normocephalic and atraumatic.   Mouth/Throat: Oropharynx is clear and moist. No oropharyngeal exudate.   Eyes: Conjunctivae and EOM are normal. Pupils are equal, round, and reactive to light. No scleral icterus.   Neck: Normal range of motion. Neck supple. No JVD present. No tracheal deviation present. No thyromegaly present.   Cardiovascular: Normal rate, regular rhythm, normal heart sounds and intact distal pulses. PMI is not displaced. Exam reveals no gallop, no friction rub and no decreased pulses.   No murmur heard.  Pulses:       Carotid pulses are 3+ on the right side, and 3+ on the left side.       Radial pulses are 3+ on the right side, and 3+ on the left side.   Pulmonary/Chest: Effort normal and breath sounds normal. No respiratory distress. He has no wheezes. He has no rales. He exhibits no tenderness.   Abdominal: Soft. Bowel sounds are normal. He exhibits no distension, no abdominal bruit and no mass. There is no splenomegaly or hepatomegaly. There is no tenderness. There is no rebound and no guarding.   Musculoskeletal: Normal range of motion. He exhibits no edema, tenderness or deformity.   Lymphadenopathy:     He has no cervical adenopathy.   Neurological: He is alert. He has normal reflexes. No cranial nerve deficit. He exhibits normal muscle tone. Coordination normal.   Skin: Skin is warm and dry. No rash noted. He is not diaphoretic. No erythema. "   Psychiatric: He has a normal mood and affect. His behavior is normal. Judgment and thought content normal.         Lab Review  Lab Results   Component Value Date     12/26/2018    K 4.4 12/26/2018     12/26/2018    BUN 22 (H) 12/26/2018    CREATININE 1.24 12/26/2018    GLUCOSE 144 (H) 12/26/2018    CALCIUM 9.7 12/26/2018    ALT 12 12/26/2018    ALKPHOS 101 12/26/2018    LABIL2 1.5 03/25/2016     Lab Results   Component Value Date    CKTOTAL 107 08/24/2018     Lab Results   Component Value Date    WBC 6.59 12/26/2018    HGB 12.0 (L) 12/26/2018    HCT 37.3 (L) 12/26/2018     12/26/2018     Lab Results   Component Value Date    INR 1.09 12/19/2018    INR 1.56 (H) 12/06/2018    INR 0.99 09/30/2018     Lab Results   Component Value Date    MG 2.1 12/12/2018     Lab Results   Component Value Date    PSA 1.450 07/11/2017    TSH 1.502 11/18/2018     Lab Results   Component Value Date    BNP 37.0 12/06/2018     Lab Results   Component Value Date    CHLPL 144 03/25/2016    CHOL 108 11/18/2018    TRIG 127 12/07/2018    HDL 43 11/18/2018    VLDL 28 04/27/2018    LDLHDL 1.44 04/27/2018     Lab Results   Component Value Date    LDL 52 11/18/2018    LDL 59 04/27/2018       Procedures       I personally viewed and interpreted the patient's LAB data         Assessment:     1. Atrial fibrillation, unspecified type (CMS/HCC)    2. Mixed hyperlipidemia    3. Essential hypertension    4. Gout without tophus    5. Chronic anticoagulation, eliquis          Plan:      Patient sees be doing very well.  He is maintaining sinus rhythm with that Tambocor therapy.  He is anticoagulated with eliquis.  Family complains of cough which is productive of yellowish sputum.  Patient is afebrile.  He was given Omnicef 300 twice a day but was instructed not to start it at this time.  He was started only if he becomes febrile or if his symptoms worsen.  He will stop anticoagulation 48 hours prior to surgery        No Follow-up on  file.

## 2018-12-30 ENCOUNTER — ANESTHESIA EVENT (OUTPATIENT)
Dept: PERIOP | Facility: HOSPITAL | Age: 68
End: 2018-12-30

## 2018-12-30 RX ORDER — FAMOTIDINE 10 MG/ML
20 INJECTION, SOLUTION INTRAVENOUS ONCE
Status: CANCELLED | OUTPATIENT
Start: 2018-12-30 | End: 2018-12-30

## 2018-12-31 ENCOUNTER — ANESTHESIA (OUTPATIENT)
Dept: PERIOP | Facility: HOSPITAL | Age: 68
End: 2018-12-31

## 2018-12-31 ENCOUNTER — HOSPITAL ENCOUNTER (INPATIENT)
Facility: HOSPITAL | Age: 68
LOS: 15 days | Discharge: NURSING, REHAB OR LONG-TERM CARE | End: 2019-01-15
Attending: SURGERY | Admitting: SURGERY

## 2018-12-31 ENCOUNTER — READMISSION MANAGEMENT (OUTPATIENT)
Dept: CALL CENTER | Facility: HOSPITAL | Age: 68
End: 2018-12-31

## 2018-12-31 ENCOUNTER — DOCUMENTATION (OUTPATIENT)
Dept: OTHER | Facility: HOSPITAL | Age: 68
End: 2018-12-31

## 2018-12-31 DIAGNOSIS — C25.9 PANCREATIC CANCER (HCC): ICD-10-CM

## 2018-12-31 DIAGNOSIS — Z74.09 IMPAIRED FUNCTIONAL MOBILITY, BALANCE, GAIT, AND ENDURANCE: Primary | ICD-10-CM

## 2018-12-31 DIAGNOSIS — Z01.89 LABORATORY TEST: ICD-10-CM

## 2018-12-31 DIAGNOSIS — Z78.9 IMPAIRED MOBILITY AND ADLS: ICD-10-CM

## 2018-12-31 DIAGNOSIS — Z74.09 IMPAIRED MOBILITY AND ADLS: ICD-10-CM

## 2018-12-31 PROBLEM — E11.9 TYPE 2 DIABETES MELLITUS (HCC): Status: ACTIVE | Noted: 2018-12-31

## 2018-12-31 LAB
ABO GROUP BLD: NORMAL
ALBUMIN SERPL-MCNC: 3.6 G/DL (ref 3.2–4.8)
ALBUMIN/GLOB SERPL: 1.6 G/DL (ref 1.5–2.5)
ALP SERPL-CCNC: 78 U/L (ref 25–100)
ALT SERPL W P-5'-P-CCNC: 28 U/L (ref 7–40)
ANION GAP SERPL CALCULATED.3IONS-SCNC: 7 MMOL/L (ref 3–11)
ARTERIAL PATENCY WRIST A: ABNORMAL
ARTERIAL PATENCY WRIST A: ABNORMAL
AST SERPL-CCNC: 47 U/L (ref 0–33)
ATMOSPHERIC PRESS: ABNORMAL MMHG
ATMOSPHERIC PRESS: ABNORMAL MMHG
BASE EXCESS BLDA CALC-SCNC: -0.6 MMOL/L (ref 0–2)
BASE EXCESS BLDA CALC-SCNC: -2.6 MMOL/L (ref 0–2)
BASE EXCESS BLDA CALC-SCNC: -2.7 MMOL/L (ref 0–2)
BASE EXCESS BLDA CALC-SCNC: -3.7 MMOL/L (ref 0–2)
BDY SITE: ABNORMAL
BDY SITE: ABNORMAL
BILIRUB SERPL-MCNC: 0.7 MG/DL (ref 0.3–1.2)
BLD GP AB SCN SERPL QL: NEGATIVE
BODY TEMPERATURE: 37 C
BODY TEMPERATURE: 37 C
BUN BLD-MCNC: 23 MG/DL (ref 9–23)
BUN/CREAT SERPL: 19.2 (ref 7–25)
CA-I BLDA-SCNC: 1.09 MMOL/L (ref 1.12–1.3)
CA-I BLDA-SCNC: 1.1 MMOL/L (ref 1.12–1.3)
CALCIUM SPEC-SCNC: 8.4 MG/DL (ref 8.7–10.4)
CHLORIDE BLDA-SCNC: 102 MMOL/L (ref 98–105)
CHLORIDE BLDA-SCNC: 103 MMOL/L (ref 98–105)
CHLORIDE SERPL-SCNC: 102 MMOL/L (ref 99–109)
CO2 BLDA-SCNC: 21 MMOL/L (ref 23–27)
CO2 BLDA-SCNC: 21.6 MMOL/L (ref 23–27)
CO2 BLDA-SCNC: 22.3 MMOL/L (ref 23–27)
CO2 BLDA-SCNC: 24.1 MMOL/L (ref 23–27)
CO2 SERPL-SCNC: 21 MMOL/L (ref 20–31)
COHGB MFR BLD: 1.4 % (ref 0–2)
COHGB MFR BLD: 1.6 % (ref 0–2)
CREAT BLD-MCNC: 1.2 MG/DL (ref 0.6–1.3)
DEPRECATED RDW RBC AUTO: 54 FL (ref 37–54)
ERYTHROCYTE [DISTWIDTH] IN BLOOD BY AUTOMATED COUNT: 16.3 % (ref 11.3–14.5)
GFR SERPL CREATININE-BSD FRML MDRD: 60 ML/MIN/1.73
GLOBULIN UR ELPH-MCNC: 2.2 GM/DL
GLUCOSE BLD-MCNC: 239 MG/DL (ref 70–100)
GLUCOSE BLDA-MCNC: 170 MG/DL (ref 67–93)
GLUCOSE BLDA-MCNC: 175 MG/DL (ref 67–93)
GLUCOSE BLDC GLUCOMTR-MCNC: 162 MG/DL (ref 70–130)
GLUCOSE BLDC GLUCOMTR-MCNC: 162 MG/DL (ref 70–130)
GLUCOSE BLDC GLUCOMTR-MCNC: 165 MG/DL (ref 70–130)
GLUCOSE BLDC GLUCOMTR-MCNC: 247 MG/DL (ref 70–130)
HCO3 BLDA-SCNC: 20.1 MMOL/L (ref 20–26)
HCO3 BLDA-SCNC: 20.5 MMOL/L (ref 20–26)
HCO3 BLDA-SCNC: 21.3 MMOL/L (ref 20–26)
HCO3 BLDA-SCNC: 23.1 MMOL/L (ref 20–26)
HCT VFR BLD AUTO: 31.1 % (ref 38.9–50.9)
HCT VFR BLD CALC: 31 %
HCT VFR BLD CALC: 31.6 %
HCT VFR BLDA CALC: 32 % (ref 39–51)
HCT VFR BLDA CALC: 35 % (ref 39–51)
HGB BLD-MCNC: 10.3 G/DL (ref 13.1–17.5)
HGB BLDA-MCNC: 10.1 G/DL (ref 13.5–17.5)
HGB BLDA-MCNC: 10.3 G/DL (ref 13.5–17.5)
HGB BLDA-MCNC: 10.8 G/DL (ref 10–16)
HGB BLDA-MCNC: 11.9 G/DL (ref 10–16)
HOROWITZ INDEX BLD+IHG-RTO: 21 %
HOROWITZ INDEX BLD+IHG-RTO: 32 %
MAGNESIUM SERPL-MCNC: 1.7 MG/DL (ref 1.3–2.7)
MCH RBC QN AUTO: 30.2 PG (ref 27–31)
MCHC RBC AUTO-ENTMCNC: 33.1 G/DL (ref 32–36)
MCV RBC AUTO: 91.2 FL (ref 80–99)
METHGB BLD QL: 0.8 % (ref 0–1.5)
METHGB BLD QL: ABNORMAL % (ref 0–1.5)
MODALITY: ABNORMAL
MODALITY: ABNORMAL
NOTE: ABNORMAL
NOTE: ABNORMAL
OXYHGB MFR BLDV: 93.4 % (ref 94–99)
OXYHGB MFR BLDV: 94.3 % (ref 94–99)
PCO2 BLDA: 29.1 MM HG (ref 34–46)
PCO2 BLDA: 32.8 MM HG
PCO2 BLDA: 33.4 MM HG
PCO2 BLDA: 34.1 MM HG (ref 34–46)
PCO2 TEMP ADJ BLD: 32.8 MM HG (ref 35–48)
PCO2 TEMP ADJ BLD: 33.4 MM HG (ref 35–48)
PH BLDA: 7.4 PH UNITS (ref 7.34–7.46)
PH BLDA: 7.42 PH UNITS (ref 7.35–7.45)
PH BLDA: 7.45 PH UNITS (ref 7.35–7.45)
PH BLDA: 7.46 PH UNITS (ref 7.34–7.46)
PH, TEMP CORRECTED: 7.42 PH UNITS
PH, TEMP CORRECTED: 7.45 PH UNITS
PHOSPHATE SERPL-MCNC: 3.7 MG/DL (ref 2.4–5.1)
PLATELET # BLD AUTO: 187 10*3/MM3 (ref 150–450)
PMV BLD AUTO: 8.9 FL (ref 6–12)
PO2 BLDA: 302.6 MMHG (ref 79–99)
PO2 BLDA: 386 MMHG (ref 79–99)
PO2 BLDA: 62.8 MM HG (ref 83–108)
PO2 BLDA: 75 MM HG (ref 83–108)
PO2 TEMP ADJ BLD: 75 MM HG (ref 83–108)
PO2 TEMP ADJ BLD: ABNORMAL MM HG (ref 83–108)
POTASSIUM BLD-SCNC: 5.3 MMOL/L (ref 3.5–5.5)
POTASSIUM BLDA-SCNC: 3.95 MMOL/L (ref 3.5–5.3)
POTASSIUM BLDA-SCNC: 4.28 MMOL/L (ref 3.5–5.3)
POTASSIUM BLDA-SCNC: 5.46 MMOL/L (ref 3.5–5.3)
PROT SERPL-MCNC: 5.8 G/DL (ref 5.7–8.2)
RBC # BLD AUTO: 3.41 10*6/MM3 (ref 4.2–5.76)
RH BLD: POSITIVE
SAO2 % BLD: 99.7 % (ref 92–98)
SAO2 % BLD: 99.8 % (ref 92–98)
SODIUM BLD-SCNC: 130 MMOL/L (ref 132–146)
SODIUM BLDA-SCNC: 128.8 MMOL/L (ref 134–146)
SODIUM BLDA-SCNC: 130 MMOL/L (ref 134–146)
T&S EXPIRATION DATE: NORMAL
VENTILATOR MODE: ABNORMAL
VENTILATOR MODE: ABNORMAL
WBC NRBC COR # BLD: 7.81 10*3/MM3 (ref 3.5–10.8)

## 2018-12-31 PROCEDURE — 82805 BLOOD GASES W/O2 SATURATION: CPT

## 2018-12-31 PROCEDURE — 94760 N-INVAS EAR/PLS OXIMETRY 1: CPT

## 2018-12-31 PROCEDURE — 25010000002 DEXAMETHASONE SODIUM PHOSPHATE 10 MG/ML SOLUTION: Performed by: ANESTHESIOLOGY

## 2018-12-31 PROCEDURE — 94640 AIRWAY INHALATION TREATMENT: CPT

## 2018-12-31 PROCEDURE — 86850 RBC ANTIBODY SCREEN: CPT | Performed by: SURGERY

## 2018-12-31 PROCEDURE — 83735 ASSAY OF MAGNESIUM: CPT | Performed by: SURGERY

## 2018-12-31 PROCEDURE — 25010000002 PROPOFOL 10 MG/ML EMULSION: Performed by: NURSE ANESTHETIST, CERTIFIED REGISTERED

## 2018-12-31 PROCEDURE — 86900 BLOOD TYPING SEROLOGIC ABO: CPT | Performed by: SURGERY

## 2018-12-31 PROCEDURE — 80053 COMPREHEN METABOLIC PANEL: CPT | Performed by: SURGERY

## 2018-12-31 PROCEDURE — 0FBG0ZZ EXCISION OF PANCREAS, OPEN APPROACH: ICD-10-PCS | Performed by: SURGERY

## 2018-12-31 PROCEDURE — 25010000002 ALBUMIN HUMAN 5% PER 50 ML: Performed by: NURSE ANESTHETIST, CERTIFIED REGISTERED

## 2018-12-31 PROCEDURE — 25010000002 DEXAMETHASONE PER 1 MG: Performed by: NURSE ANESTHETIST, CERTIFIED REGISTERED

## 2018-12-31 PROCEDURE — 07BD0ZX EXCISION OF AORTIC LYMPHATIC, OPEN APPROACH, DIAGNOSTIC: ICD-10-PCS | Performed by: SURGERY

## 2018-12-31 PROCEDURE — 25010000002 HYDROMORPHONE HCL-NACL 30-0.9 MG/30ML-% SOLUTION PREFILLED SYRINGE: Performed by: SURGERY

## 2018-12-31 PROCEDURE — 25010000002 BUPRENORPHINE PER 0.1 MG: Performed by: ANESTHESIOLOGY

## 2018-12-31 PROCEDURE — 88331 PATH CONSLTJ SURG 1 BLK 1SPC: CPT | Performed by: PATHOLOGY

## 2018-12-31 PROCEDURE — 88307 TISSUE EXAM BY PATHOLOGIST: CPT | Performed by: SURGERY

## 2018-12-31 PROCEDURE — 25010000002 ONDANSETRON PER 1 MG: Performed by: NURSE ANESTHETIST, CERTIFIED REGISTERED

## 2018-12-31 PROCEDURE — 88305 TISSUE EXAM BY PATHOLOGIST: CPT | Performed by: SURGERY

## 2018-12-31 PROCEDURE — 25010000002 PROPOFOL 1000 MG/ML EMULSION: Performed by: NURSE ANESTHETIST, CERTIFIED REGISTERED

## 2018-12-31 PROCEDURE — 63710000001 INSULIN LISPRO (HUMAN) PER 5 UNITS

## 2018-12-31 PROCEDURE — 88309 TISSUE EXAM BY PATHOLOGIST: CPT | Performed by: SURGERY

## 2018-12-31 PROCEDURE — 94799 UNLISTED PULMONARY SVC/PX: CPT

## 2018-12-31 PROCEDURE — 86901 BLOOD TYPING SEROLOGIC RH(D): CPT | Performed by: SURGERY

## 2018-12-31 PROCEDURE — 63710000001 INSULIN LISPRO (HUMAN) PER 5 UNITS: Performed by: SURGERY

## 2018-12-31 PROCEDURE — 84132 ASSAY OF SERUM POTASSIUM: CPT | Performed by: SURGERY

## 2018-12-31 PROCEDURE — 85018 HEMOGLOBIN: CPT | Performed by: SURGERY

## 2018-12-31 PROCEDURE — 06B80ZZ EXCISION OF PORTAL VEIN, OPEN APPROACH: ICD-10-PCS | Performed by: SURGERY

## 2018-12-31 PROCEDURE — 82435 ASSAY OF BLOOD CHLORIDE: CPT | Performed by: SURGERY

## 2018-12-31 PROCEDURE — 84100 ASSAY OF PHOSPHORUS: CPT | Performed by: SURGERY

## 2018-12-31 PROCEDURE — 82962 GLUCOSE BLOOD TEST: CPT

## 2018-12-31 PROCEDURE — 25010000002 FENTANYL CITRATE (PF) 100 MCG/2ML SOLUTION: Performed by: NURSE ANESTHETIST, CERTIFIED REGISTERED

## 2018-12-31 PROCEDURE — 0FB10ZX EXCISION OF RIGHT LOBE LIVER, OPEN APPROACH, DIAGNOSTIC: ICD-10-PCS | Performed by: SURGERY

## 2018-12-31 PROCEDURE — 25010000003 CEFAZOLIN IN DEXTROSE 2-4 GM/100ML-% SOLUTION: Performed by: SURGERY

## 2018-12-31 PROCEDURE — 99221 1ST HOSP IP/OBS SF/LOW 40: CPT | Performed by: NURSE PRACTITIONER

## 2018-12-31 PROCEDURE — 36600 WITHDRAWAL OF ARTERIAL BLOOD: CPT

## 2018-12-31 PROCEDURE — 82805 BLOOD GASES W/O2 SATURATION: CPT | Performed by: SURGERY

## 2018-12-31 PROCEDURE — 82330 ASSAY OF CALCIUM: CPT | Performed by: SURGERY

## 2018-12-31 PROCEDURE — P9041 ALBUMIN (HUMAN),5%, 50ML: HCPCS | Performed by: NURSE ANESTHETIST, CERTIFIED REGISTERED

## 2018-12-31 PROCEDURE — 25010000002 HEPARIN (PORCINE) PER 1000 UNITS: Performed by: SURGERY

## 2018-12-31 PROCEDURE — 84295 ASSAY OF SERUM SODIUM: CPT | Performed by: SURGERY

## 2018-12-31 PROCEDURE — 85027 COMPLETE CBC AUTOMATED: CPT | Performed by: SURGERY

## 2018-12-31 PROCEDURE — 25010000002 NEOSTIGMINE 10 MG/10ML SOLUTION: Performed by: NURSE ANESTHETIST, CERTIFIED REGISTERED

## 2018-12-31 PROCEDURE — 84132 ASSAY OF SERUM POTASSIUM: CPT | Performed by: ANESTHESIOLOGY

## 2018-12-31 PROCEDURE — 0DB90ZZ EXCISION OF DUODENUM, OPEN APPROACH: ICD-10-PCS | Performed by: SURGERY

## 2018-12-31 DEVICE — ARTICULATING RELOAD WITH TRI-STAPLE TECHNOLOGY
Type: IMPLANTABLE DEVICE | Site: ABDOMEN | Status: FUNCTIONAL
Brand: ENDO GIA

## 2018-12-31 DEVICE — CURVED TIP INTELLIGENT RELOAD AND INTRODUCER
Type: IMPLANTABLE DEVICE | Site: ABDOMEN | Status: FUNCTIONAL
Brand: TRI-STAPLE 2.0

## 2018-12-31 RX ORDER — FENTANYL CITRATE 50 UG/ML
INJECTION, SOLUTION INTRAMUSCULAR; INTRAVENOUS AS NEEDED
Status: DISCONTINUED | OUTPATIENT
Start: 2018-12-31 | End: 2018-12-31 | Stop reason: SURG

## 2018-12-31 RX ORDER — SODIUM CHLORIDE, SODIUM LACTATE, POTASSIUM CHLORIDE, CALCIUM CHLORIDE 600; 310; 30; 20 MG/100ML; MG/100ML; MG/100ML; MG/100ML
9 INJECTION, SOLUTION INTRAVENOUS CONTINUOUS
Status: DISCONTINUED | OUTPATIENT
Start: 2018-12-31 | End: 2018-12-31

## 2018-12-31 RX ORDER — ONDANSETRON 2 MG/ML
4 INJECTION INTRAMUSCULAR; INTRAVENOUS EVERY 6 HOURS PRN
Status: DISCONTINUED | OUTPATIENT
Start: 2018-12-31 | End: 2019-01-15 | Stop reason: HOSPADM

## 2018-12-31 RX ORDER — DEXAMETHASONE SODIUM PHOSPHATE 4 MG/ML
INJECTION, SOLUTION INTRA-ARTICULAR; INTRALESIONAL; INTRAMUSCULAR; INTRAVENOUS; SOFT TISSUE AS NEEDED
Status: DISCONTINUED | OUTPATIENT
Start: 2018-12-31 | End: 2018-12-31 | Stop reason: SURG

## 2018-12-31 RX ORDER — MONTELUKAST SODIUM 10 MG/1
10 TABLET ORAL NIGHTLY
Status: DISCONTINUED | OUTPATIENT
Start: 2018-12-31 | End: 2019-01-05

## 2018-12-31 RX ORDER — PREGABALIN 75 MG/1
75 CAPSULE ORAL DAILY
Status: DISCONTINUED | OUTPATIENT
Start: 2018-12-31 | End: 2019-01-04

## 2018-12-31 RX ORDER — GLYCOPYRROLATE 0.2 MG/ML
INJECTION INTRAMUSCULAR; INTRAVENOUS AS NEEDED
Status: DISCONTINUED | OUTPATIENT
Start: 2018-12-31 | End: 2018-12-31 | Stop reason: SURG

## 2018-12-31 RX ORDER — MAGNESIUM HYDROXIDE 1200 MG/15ML
LIQUID ORAL AS NEEDED
Status: DISCONTINUED | OUTPATIENT
Start: 2018-12-31 | End: 2018-12-31 | Stop reason: HOSPADM

## 2018-12-31 RX ORDER — SODIUM CHLORIDE, SODIUM LACTATE, POTASSIUM CHLORIDE, CALCIUM CHLORIDE 600; 310; 30; 20 MG/100ML; MG/100ML; MG/100ML; MG/100ML
INJECTION, SOLUTION INTRAVENOUS CONTINUOUS PRN
Status: DISCONTINUED | OUTPATIENT
Start: 2018-12-31 | End: 2018-12-31 | Stop reason: SURG

## 2018-12-31 RX ORDER — ONDANSETRON 2 MG/ML
INJECTION INTRAMUSCULAR; INTRAVENOUS AS NEEDED
Status: DISCONTINUED | OUTPATIENT
Start: 2018-12-31 | End: 2018-12-31 | Stop reason: SURG

## 2018-12-31 RX ORDER — BUPRENORPHINE HYDROCHLORIDE 0.32 MG/ML
INJECTION INTRAMUSCULAR; INTRAVENOUS
Status: COMPLETED | OUTPATIENT
Start: 2018-12-31 | End: 2018-12-31

## 2018-12-31 RX ORDER — LIDOCAINE HYDROCHLORIDE 10 MG/ML
INJECTION, SOLUTION EPIDURAL; INFILTRATION; INTRACAUDAL; PERINEURAL AS NEEDED
Status: DISCONTINUED | OUTPATIENT
Start: 2018-12-31 | End: 2018-12-31 | Stop reason: SURG

## 2018-12-31 RX ORDER — FAMOTIDINE 20 MG/1
20 TABLET, FILM COATED ORAL ONCE
Status: COMPLETED | OUTPATIENT
Start: 2018-12-31 | End: 2018-12-31

## 2018-12-31 RX ORDER — NEOSTIGMINE METHYLSULFATE 1 MG/ML
INJECTION, SOLUTION INTRAVENOUS AS NEEDED
Status: DISCONTINUED | OUTPATIENT
Start: 2018-12-31 | End: 2018-12-31 | Stop reason: SURG

## 2018-12-31 RX ORDER — NITROGLYCERIN 0.4 MG/1
0.4 TABLET SUBLINGUAL AS NEEDED
Status: DISCONTINUED | OUTPATIENT
Start: 2018-12-31 | End: 2019-01-15 | Stop reason: HOSPADM

## 2018-12-31 RX ORDER — SODIUM CHLORIDE 0.9 % (FLUSH) 0.9 %
3-10 SYRINGE (ML) INJECTION AS NEEDED
Status: DISCONTINUED | OUTPATIENT
Start: 2018-12-31 | End: 2018-12-31 | Stop reason: HOSPADM

## 2018-12-31 RX ORDER — ONDANSETRON 2 MG/ML
4 INJECTION INTRAMUSCULAR; INTRAVENOUS ONCE AS NEEDED
Status: DISCONTINUED | OUTPATIENT
Start: 2018-12-31 | End: 2018-12-31 | Stop reason: HOSPADM

## 2018-12-31 RX ORDER — HEPARIN SODIUM 5000 [USP'U]/ML
5000 INJECTION, SOLUTION INTRAVENOUS; SUBCUTANEOUS ONCE
Status: COMPLETED | OUTPATIENT
Start: 2018-12-31 | End: 2018-12-31

## 2018-12-31 RX ORDER — COLCHICINE 0.6 MG/1
0.6 TABLET ORAL DAILY
Status: DISCONTINUED | OUTPATIENT
Start: 2018-12-31 | End: 2019-01-05

## 2018-12-31 RX ORDER — ROCURONIUM BROMIDE 10 MG/ML
INJECTION, SOLUTION INTRAVENOUS AS NEEDED
Status: DISCONTINUED | OUTPATIENT
Start: 2018-12-31 | End: 2018-12-31 | Stop reason: SURG

## 2018-12-31 RX ORDER — LIDOCAINE HYDROCHLORIDE 10 MG/ML
0.5 INJECTION, SOLUTION EPIDURAL; INFILTRATION; INTRACAUDAL; PERINEURAL ONCE AS NEEDED
Status: COMPLETED | OUTPATIENT
Start: 2018-12-31 | End: 2018-12-31

## 2018-12-31 RX ORDER — ACETAMINOPHEN 500 MG
1000 TABLET ORAL ONCE
Status: COMPLETED | OUTPATIENT
Start: 2018-12-31 | End: 2018-12-31

## 2018-12-31 RX ORDER — ESMOLOL HYDROCHLORIDE 10 MG/ML
INJECTION INTRAVENOUS AS NEEDED
Status: DISCONTINUED | OUTPATIENT
Start: 2018-12-31 | End: 2018-12-31 | Stop reason: SURG

## 2018-12-31 RX ORDER — HYDROMORPHONE HYDROCHLORIDE 1 MG/ML
0.5 INJECTION, SOLUTION INTRAMUSCULAR; INTRAVENOUS; SUBCUTANEOUS
Status: DISCONTINUED | OUTPATIENT
Start: 2018-12-31 | End: 2018-12-31 | Stop reason: HOSPADM

## 2018-12-31 RX ORDER — ALBUMIN, HUMAN INJ 5% 5 %
SOLUTION INTRAVENOUS CONTINUOUS PRN
Status: DISCONTINUED | OUTPATIENT
Start: 2018-12-31 | End: 2018-12-31 | Stop reason: SURG

## 2018-12-31 RX ORDER — BUDESONIDE 0.5 MG/2ML
0.5 INHALANT ORAL
Status: DISCONTINUED | OUTPATIENT
Start: 2018-12-31 | End: 2019-01-15

## 2018-12-31 RX ORDER — ALBUTEROL SULFATE 2.5 MG/3ML
2.5 SOLUTION RESPIRATORY (INHALATION) EVERY 4 HOURS PRN
Status: DISCONTINUED | OUTPATIENT
Start: 2018-12-31 | End: 2019-01-05

## 2018-12-31 RX ORDER — CEFAZOLIN SODIUM 2 G/100ML
2 INJECTION, SOLUTION INTRAVENOUS ONCE
Status: COMPLETED | OUTPATIENT
Start: 2018-12-31 | End: 2018-12-31

## 2018-12-31 RX ORDER — ACETAMINOPHEN 500 MG
1000 TABLET ORAL EVERY 6 HOURS SCHEDULED
Status: DISCONTINUED | OUTPATIENT
Start: 2018-12-31 | End: 2019-01-01 | Stop reason: ALTCHOICE

## 2018-12-31 RX ORDER — PROPOFOL 10 MG/ML
VIAL (ML) INTRAVENOUS AS NEEDED
Status: DISCONTINUED | OUTPATIENT
Start: 2018-12-31 | End: 2018-12-31 | Stop reason: SURG

## 2018-12-31 RX ORDER — DEXAMETHASONE SODIUM PHOSPHATE 10 MG/ML
INJECTION, SOLUTION INTRAMUSCULAR; INTRAVENOUS
Status: COMPLETED | OUTPATIENT
Start: 2018-12-31 | End: 2018-12-31

## 2018-12-31 RX ORDER — CYCLOBENZAPRINE HCL 10 MG
10 TABLET ORAL EVERY 8 HOURS PRN
Status: DISCONTINUED | OUTPATIENT
Start: 2018-12-31 | End: 2019-01-01

## 2018-12-31 RX ORDER — FLECAINIDE ACETATE 50 MG/1
50 TABLET ORAL EVERY 12 HOURS SCHEDULED
Status: DISCONTINUED | OUTPATIENT
Start: 2018-12-31 | End: 2019-01-05

## 2018-12-31 RX ORDER — FENTANYL CITRATE 50 UG/ML
50 INJECTION, SOLUTION INTRAMUSCULAR; INTRAVENOUS
Status: DISCONTINUED | OUTPATIENT
Start: 2018-12-31 | End: 2018-12-31 | Stop reason: HOSPADM

## 2018-12-31 RX ORDER — SODIUM CHLORIDE 9 MG/ML
100 INJECTION, SOLUTION INTRAVENOUS CONTINUOUS
Status: DISCONTINUED | OUTPATIENT
Start: 2018-12-31 | End: 2019-01-01

## 2018-12-31 RX ORDER — ALLOPURINOL 100 MG/1
100 TABLET ORAL NIGHTLY
Status: DISCONTINUED | OUTPATIENT
Start: 2018-12-31 | End: 2019-01-05

## 2018-12-31 RX ORDER — LABETALOL HYDROCHLORIDE 5 MG/ML
INJECTION, SOLUTION INTRAVENOUS AS NEEDED
Status: DISCONTINUED | OUTPATIENT
Start: 2018-12-31 | End: 2018-12-31 | Stop reason: SURG

## 2018-12-31 RX ORDER — PREGABALIN 75 MG/1
75 CAPSULE ORAL ONCE
Status: COMPLETED | OUTPATIENT
Start: 2018-12-31 | End: 2018-12-31

## 2018-12-31 RX ORDER — ATRACURIUM BESYLATE 10 MG/ML
INJECTION, SOLUTION INTRAVENOUS AS NEEDED
Status: DISCONTINUED | OUTPATIENT
Start: 2018-12-31 | End: 2018-12-31 | Stop reason: SURG

## 2018-12-31 RX ORDER — PANTOPRAZOLE SODIUM 40 MG/1
40 TABLET, DELAYED RELEASE ORAL EVERY MORNING
Status: DISCONTINUED | OUTPATIENT
Start: 2019-01-01 | End: 2019-01-05

## 2018-12-31 RX ORDER — SODIUM CHLORIDE 0.9 % (FLUSH) 0.9 %
3 SYRINGE (ML) INJECTION EVERY 12 HOURS SCHEDULED
Status: DISCONTINUED | OUTPATIENT
Start: 2018-12-31 | End: 2018-12-31 | Stop reason: HOSPADM

## 2018-12-31 RX ORDER — NALOXONE HCL 0.4 MG/ML
0.1 VIAL (ML) INJECTION
Status: DISCONTINUED | OUTPATIENT
Start: 2018-12-31 | End: 2019-01-15 | Stop reason: HOSPADM

## 2018-12-31 RX ORDER — PROMETHAZINE HYDROCHLORIDE 25 MG/ML
12.5 INJECTION, SOLUTION INTRAMUSCULAR; INTRAVENOUS EVERY 6 HOURS PRN
Status: DISCONTINUED | OUTPATIENT
Start: 2018-12-31 | End: 2019-01-02

## 2018-12-31 RX ORDER — MELOXICAM 7.5 MG/1
15 TABLET ORAL ONCE
Status: COMPLETED | OUTPATIENT
Start: 2018-12-31 | End: 2018-12-31

## 2018-12-31 RX ORDER — PROMETHAZINE HYDROCHLORIDE 25 MG/ML
6.25 INJECTION, SOLUTION INTRAMUSCULAR; INTRAVENOUS EVERY 8 HOURS PRN
Status: DISCONTINUED | OUTPATIENT
Start: 2018-12-31 | End: 2019-01-02

## 2018-12-31 RX ORDER — BUPIVACAINE HYDROCHLORIDE 2.5 MG/ML
INJECTION, SOLUTION EPIDURAL; INFILTRATION; INTRACAUDAL
Status: COMPLETED | OUTPATIENT
Start: 2018-12-31 | End: 2018-12-31

## 2018-12-31 RX ORDER — SENNA AND DOCUSATE SODIUM 50; 8.6 MG/1; MG/1
2 TABLET, FILM COATED ORAL NIGHTLY
Status: DISCONTINUED | OUTPATIENT
Start: 2018-12-31 | End: 2019-01-05

## 2018-12-31 RX ORDER — FLUOXETINE HYDROCHLORIDE 20 MG/1
20 CAPSULE ORAL 2 TIMES DAILY
Status: DISCONTINUED | OUTPATIENT
Start: 2018-12-31 | End: 2019-01-05

## 2018-12-31 RX ORDER — ALBUTEROL SULFATE 2.5 MG/3ML
2.5 SOLUTION RESPIRATORY (INHALATION) 2 TIMES DAILY PRN
Status: DISCONTINUED | OUTPATIENT
Start: 2018-12-31 | End: 2019-01-05

## 2018-12-31 RX ADMIN — SODIUM CHLORIDE 100 ML/HR: 9 INJECTION, SOLUTION INTRAVENOUS at 17:40

## 2018-12-31 RX ADMIN — LIDOCAINE HYDROCHLORIDE 0.2 ML: 10 INJECTION, SOLUTION EPIDURAL; INFILTRATION; INTRACAUDAL; PERINEURAL at 07:03

## 2018-12-31 RX ADMIN — BUPRENORPHINE HYDROCHLORIDE 0.3 MG: 0.32 INJECTION INTRAMUSCULAR; INTRAVENOUS at 08:47

## 2018-12-31 RX ADMIN — ATRACURIUM BESYLATE 10 MG: 10 INJECTION, SOLUTION INTRAVENOUS at 10:26

## 2018-12-31 RX ADMIN — INSULIN LISPRO 2 UNITS: 100 INJECTION, SOLUTION INTRAVENOUS; SUBCUTANEOUS at 18:59

## 2018-12-31 RX ADMIN — EPHEDRINE SULFATE 10 MG: 50 INJECTION INTRAMUSCULAR; INTRAVENOUS; SUBCUTANEOUS at 10:34

## 2018-12-31 RX ADMIN — ESMOLOL HYDROCHLORIDE 40 MG: 10 INJECTION INTRAVENOUS at 08:36

## 2018-12-31 RX ADMIN — ATRACURIUM BESYLATE 10 MG: 10 INJECTION, SOLUTION INTRAVENOUS at 10:58

## 2018-12-31 RX ADMIN — ATRACURIUM BESYLATE 10 MG: 10 INJECTION, SOLUTION INTRAVENOUS at 09:59

## 2018-12-31 RX ADMIN — ROCURONIUM BROMIDE 10 MG: 10 SOLUTION INTRAVENOUS at 11:29

## 2018-12-31 RX ADMIN — PROPOFOL 120 MG: 10 INJECTION, EMULSION INTRAVENOUS at 08:07

## 2018-12-31 RX ADMIN — ALBUMIN HUMAN: 0.05 INJECTION, SOLUTION INTRAVENOUS at 11:15

## 2018-12-31 RX ADMIN — LABETALOL HYDROCHLORIDE 5 MG: 5 INJECTION, SOLUTION INTRAVENOUS at 09:29

## 2018-12-31 RX ADMIN — SODIUM CHLORIDE, POTASSIUM CHLORIDE, SODIUM LACTATE AND CALCIUM CHLORIDE 9 ML/HR: 600; 310; 30; 20 INJECTION, SOLUTION INTRAVENOUS at 07:03

## 2018-12-31 RX ADMIN — NEOSTIGMINE METHYLSULFATE 2.5 MG: 1 INJECTION, SOLUTION INTRAVENOUS at 12:44

## 2018-12-31 RX ADMIN — ONDANSETRON 4 MG: 2 INJECTION INTRAMUSCULAR; INTRAVENOUS at 12:27

## 2018-12-31 RX ADMIN — ROCURONIUM BROMIDE 5 MG: 10 SOLUTION INTRAVENOUS at 12:00

## 2018-12-31 RX ADMIN — METRONIDAZOLE 500 MG: 500 INJECTION, SOLUTION INTRAVENOUS at 08:30

## 2018-12-31 RX ADMIN — SODIUM CHLORIDE, POTASSIUM CHLORIDE, SODIUM LACTATE AND CALCIUM CHLORIDE: 600; 310; 30; 20 INJECTION, SOLUTION INTRAVENOUS at 08:00

## 2018-12-31 RX ADMIN — HEPARIN SODIUM 5000 UNITS: 5000 INJECTION, SOLUTION INTRAVENOUS; SUBCUTANEOUS at 07:11

## 2018-12-31 RX ADMIN — FENTANYL CITRATE 25 MCG: 50 INJECTION, SOLUTION INTRAMUSCULAR; INTRAVENOUS at 08:32

## 2018-12-31 RX ADMIN — ACETAMINOPHEN 1000 MG: 500 TABLET ORAL at 07:08

## 2018-12-31 RX ADMIN — GLYCOPYRROLATE 0.4 MG: 0.2 INJECTION, SOLUTION INTRAMUSCULAR; INTRAVENOUS at 12:44

## 2018-12-31 RX ADMIN — LIDOCAINE HYDROCHLORIDE 50 MG: 10 INJECTION, SOLUTION EPIDURAL; INFILTRATION; INTRACAUDAL; PERINEURAL at 08:07

## 2018-12-31 RX ADMIN — MELOXICAM 15 MG: 7.5 TABLET ORAL at 07:08

## 2018-12-31 RX ADMIN — CEFAZOLIN SODIUM 2 G: 2 INJECTION, SOLUTION INTRAVENOUS at 08:00

## 2018-12-31 RX ADMIN — ALBUMIN HUMAN: 0.05 INJECTION, SOLUTION INTRAVENOUS at 10:00

## 2018-12-31 RX ADMIN — FENTANYL CITRATE 75 MCG: 50 INJECTION, SOLUTION INTRAMUSCULAR; INTRAVENOUS at 08:07

## 2018-12-31 RX ADMIN — DEXAMETHASONE SODIUM PHOSPHATE 8 MG: 4 INJECTION, SOLUTION INTRAMUSCULAR; INTRAVENOUS at 08:21

## 2018-12-31 RX ADMIN — ROCURONIUM BROMIDE 5 MG: 10 SOLUTION INTRAVENOUS at 12:28

## 2018-12-31 RX ADMIN — SODIUM CHLORIDE, POTASSIUM CHLORIDE, SODIUM LACTATE AND CALCIUM CHLORIDE: 600; 310; 30; 20 INJECTION, SOLUTION INTRAVENOUS at 11:45

## 2018-12-31 RX ADMIN — BUDESONIDE 0.5 MG: 0.5 INHALANT RESPIRATORY (INHALATION) at 19:48

## 2018-12-31 RX ADMIN — PROPOFOL 25 MCG/KG/MIN: 10 INJECTION, EMULSION INTRAVENOUS at 08:29

## 2018-12-31 RX ADMIN — EPHEDRINE SULFATE 10 MG: 50 INJECTION INTRAMUSCULAR; INTRAVENOUS; SUBCUTANEOUS at 10:20

## 2018-12-31 RX ADMIN — FAMOTIDINE 20 MG: 20 TABLET, FILM COATED ORAL at 07:08

## 2018-12-31 RX ADMIN — DEXAMETHASONE SODIUM PHOSPHATE 4 MG: 10 INJECTION, SOLUTION INTRAMUSCULAR; INTRAVENOUS at 08:47

## 2018-12-31 RX ADMIN — LABETALOL HYDROCHLORIDE 5 MG: 5 INJECTION, SOLUTION INTRAVENOUS at 08:45

## 2018-12-31 RX ADMIN — ATRACURIUM BESYLATE 50 MG: 10 INJECTION, SOLUTION INTRAVENOUS at 08:07

## 2018-12-31 RX ADMIN — ATRACURIUM BESYLATE 10 MG: 10 INJECTION, SOLUTION INTRAVENOUS at 09:00

## 2018-12-31 RX ADMIN — HYDROMORPHONE HYDROCHLORIDE: 10 INJECTION INTRAMUSCULAR; INTRAVENOUS; SUBCUTANEOUS at 13:56

## 2018-12-31 RX ADMIN — PREGABALIN 75 MG: 75 CAPSULE ORAL at 07:08

## 2018-12-31 RX ADMIN — BUPIVACAINE HYDROCHLORIDE 30 ML: 2.5 INJECTION, SOLUTION EPIDURAL; INFILTRATION; INTRACAUDAL; PERINEURAL at 08:47

## 2018-12-31 RX ADMIN — ATRACURIUM BESYLATE 10 MG: 10 INJECTION, SOLUTION INTRAVENOUS at 09:29

## 2018-12-31 NOTE — ANESTHESIA PROCEDURE NOTES
ANESTHESIA INTUBATION  Urgency: elective    Airway not difficult    General Information and Staff    Patient location during procedure: OR    Indications and Patient Condition  Indications for airway management: airway protection    Preoxygenated: yes  MILS not maintained throughout  Mask difficulty assessment: 1 - vent by mask    Final Airway Details  Final airway type: endotracheal airway      Successful airway: ETT  Cuffed: yes   Successful intubation technique: direct laryngoscopy  Facilitating devices/methods: intubating stylet and cricoid pressure  Endotracheal tube insertion site: oral  Blade: Juancho  Blade size: 4  ETT size (mm): 7.5  Cormack-Lehane Classification: grade IIb - view of arytenoids or posterior of glottis only  Placement verified by: chest auscultation and capnometry   Measured from: lips  ETT to lips (cm): 20  Number of attempts at approach: 1    Additional Comments  Negative epigastric sounds, Breath sound equal bilaterally with symmetric chest rise and fall

## 2018-12-31 NOTE — PROGRESS NOTES
I went and saw patient's family in the surgery waiting area. I talked with patient's spouse and son and they all were so glad that patient is now in surgery after all the patient has gone through. Patient has been in the hospital several times being septic. Patient was able to tolerate [place] and spend time with family. I asked spouse if she needed anything and she said that she had my name in her phone and would call if she needed anything. I will continue to be available to navigate as needed. AG

## 2018-12-31 NOTE — ANESTHESIA PROCEDURE NOTES
Arterial Line      Patient location during procedure: pre-op   Line placed for hemodynamic monitoring.  Performed By   Anesthesiologist: Mateo Powers MD  Preanesthetic Checklist  Completed: patient identified, site marked, surgical consent, pre-op evaluation, timeout performed, IV checked, risks and benefits discussed and monitors and equipment checked  Arterial Line Prep   Sterile Tech: cap, gloves and sterile barriers  Prep: ChloraPrep  Patient monitoring: blood pressure monitoring, continuous pulse oximetry and EKG  Arterial Line Procedure   Laterality:right  Location:  radial artery  Catheter size: 20 G   Guidance: palpation technique  Number of attempts: 1  Successful placement: yes          Post Assessment   Dressing Type: occlusive dressing applied, secured with tape and wrist guard applied.   Complications no  Circ/Move/Sens Assessment: normal and unchanged.   Patient Tolerance: patient tolerated the procedure well with no apparent complications

## 2018-12-31 NOTE — ANESTHESIA POSTPROCEDURE EVALUATION
Patient: Todd Phillips    Procedure Summary     Date:  12/31/18 [facility] / [facility]    Anesthesia Start:  0800 Anesthesia Stop:      Procedure:  WHIPPLE PROCEDURE, BIOPSY OF LIVER, PORTAL VEIN RESECTION AND REPAIR, G/J TUBE PLACEMENT (N/A Abdomen) Diagnosis:      Surgeon:  Miquel Brody MD Provider:  Mateo Powers MD    Anesthesia Type:  general ASA Status:  3          Anesthesia Type: general  Last vitals  /88  141/89 (12/31/18 0655)   Temp 97.8  98.5 °F (36.9 °C) (12/31/18 0655)   Pulse 80  105 (12/31/18 0655)   Resp 16  18 (12/31/18 0655)     SpO2 99  95 % (12/31/18 0655)     Post Anesthesia Care and Evaluation    Patient location during evaluation: PACU  Patient participation: complete - patient participated  Level of consciousness: awake and alert  Pain score: 0  Pain management: adequate  Airway patency: patent  Anesthetic complications: No anesthetic complications  PONV Status: none  Cardiovascular status: hemodynamically stable and acceptable  Respiratory status: nonlabored ventilation, acceptable, nasal cannula and oral airway  Hydration status: acceptable

## 2018-12-31 NOTE — ANESTHESIA PROCEDURE NOTES
ANESTHESIA PERIPHERAL BLOCK      Patient location during procedure: OR  Reason for block: at surgeon's request and post-op pain management  Performed by  Anesthesiologist: Mateo Powers MD  Preanesthetic Checklist  Completed: patient identified, site marked, surgical consent, pre-op evaluation, timeout performed, IV checked, risks and benefits discussed and monitors and equipment checked  Prep:  Pt Position: supine  Sterile barriers:cap, gloves, sterile barriers and mask  Prep: ChloraPrep  Patient monitoring: blood pressure monitoring, continuous pulse oximetry and EKG  Procedure  Sedation:yes  Performed under: general  Guidance:ultrasound guided  Images:still images obtained    Laterality:Bilateral  Block Type:TAP  Injection Technique:single-shot  Needle Type:short-bevel and echogenic  Needle Gauge:20 G    Medications Used: buprenorphine (BUPRENEX) injection, 0.3 mg  dexamethasone sodium phosphate injection, 4 mg  bupivacaine PF (MARCAINE) 0.25 % injection, 30 mL  Medications  Comment:Block Injection:  LA dose divided between Right and Left block       Adjuncts:  Decadron 4mg PSF, Buprenex 0.3mg (Per total volume of LA)    Post Assessment  Injection Assessment: negative aspiration for heme, incremental injection and no paresthesia on injection  Patient Tolerance:comfortable throughout block  Complications:no  Additional Notes    4 quadrant   Under Ultrasound guidance, a BBraun 4inch 360 degree needle was advanced with Normal Saline hydro dissection of tissue.  The Internal Oblique and Transversus Abdominus muscles where visualized.  At or before the aponeurosis of Internal Oblique, local anesthetic spread was visualized in the Transversus Abdominus Plane. Injection was made incrementally with aspiration every 5 mls.  There was no  intravascular injection,  injection pressure was normal, there was no neural injection, and the procedure was completed without difficulty.  Thank You.

## 2018-12-31 NOTE — ANESTHESIA PREPROCEDURE EVALUATION
Anesthesia Evaluation     Patient summary reviewed and Nursing notes reviewed                Airway   Mallampati: III  TM distance: >3 FB  Neck ROM: full  Possible difficult intubation  Dental - normal exam     Pulmonary - normal exam   (+) COPD, asthma, sleep apnea on CPAP,   Cardiovascular - normal exam    (+) hypertension, CAD, dysrhythmias (;aroxysmal; eliquis treatment (last dose 3 days ago)) Atrial Fib, hyperlipidemia,     ROS comment: Echo 10/17: normal EF, normal valves  Stress test 10/17: negative for ischemia    Neuro/Psych- negative ROS  GI/Hepatic/Renal/Endo    (+)  GERD,  renal disease CRI, diabetes mellitus using insulin,     ROS Comment: Pancreatic cancer    Musculoskeletal (-) negative ROS    Abdominal  - normal exam    Bowel sounds: normal.   Substance History - negative use     OB/GYN negative ob/gyn ROS         Other                      Anesthesia Plan    ASA 3     general   (A line, +/- CVL, TAP blocks)  intravenous induction   Anesthetic plan, all risks, benefits, and alternatives have been provided, discussed and informed consent has been obtained with: patient.    Plan discussed with CRNA.

## 2019-01-01 PROBLEM — K21.9 GASTROESOPHAGEAL REFLUX DISEASE WITHOUT ESOPHAGITIS: Chronic | Status: ACTIVE | Noted: 2017-02-09

## 2019-01-01 PROBLEM — I48.91 ATRIAL FIBRILLATION (HCC): Chronic | Status: ACTIVE | Noted: 2017-10-22

## 2019-01-01 PROBLEM — Z79.01 CHRONIC ANTICOAGULATION: Chronic | Status: ACTIVE | Noted: 2017-10-27

## 2019-01-01 PROBLEM — E11.9 DM2 (DIABETES MELLITUS, TYPE 2) (HCC): Chronic | Status: ACTIVE | Noted: 2017-10-22

## 2019-01-01 LAB
AMYLASE FLD-CCNC: 3498 U/L
ANION GAP SERPL CALCULATED.3IONS-SCNC: 10 MMOL/L (ref 3–11)
BASOPHILS # BLD AUTO: 0 10*3/MM3 (ref 0–0.2)
BASOPHILS NFR BLD AUTO: 0 % (ref 0–1)
BUN BLD-MCNC: 22 MG/DL (ref 9–23)
BUN/CREAT SERPL: 21.4 (ref 7–25)
CALCIUM SPEC-SCNC: 8.3 MG/DL (ref 8.7–10.4)
CHLORIDE SERPL-SCNC: 101 MMOL/L (ref 99–109)
CO2 SERPL-SCNC: 22 MMOL/L (ref 20–31)
CREAT BLD-MCNC: 1.03 MG/DL (ref 0.6–1.3)
DEPRECATED RDW RBC AUTO: 54.1 FL (ref 37–54)
EOSINOPHIL # BLD AUTO: 0 10*3/MM3 (ref 0–0.3)
EOSINOPHIL NFR BLD AUTO: 0 % (ref 0–3)
ERYTHROCYTE [DISTWIDTH] IN BLOOD BY AUTOMATED COUNT: 16.5 % (ref 11.3–14.5)
GFR SERPL CREATININE-BSD FRML MDRD: 71 ML/MIN/1.73
GLUCOSE BLD-MCNC: 199 MG/DL (ref 70–100)
GLUCOSE BLDC GLUCOMTR-MCNC: 155 MG/DL (ref 70–130)
GLUCOSE BLDC GLUCOMTR-MCNC: 159 MG/DL (ref 70–130)
GLUCOSE BLDC GLUCOMTR-MCNC: 201 MG/DL (ref 70–130)
GLUCOSE BLDC GLUCOMTR-MCNC: 209 MG/DL (ref 70–130)
GLUCOSE BLDC GLUCOMTR-MCNC: 240 MG/DL (ref 70–130)
HBA1C MFR BLD: 6.1 % (ref 4.8–5.6)
HCT VFR BLD AUTO: 30.1 % (ref 38.9–50.9)
HGB BLD-MCNC: 9.9 G/DL (ref 13.1–17.5)
IMM GRANULOCYTES # BLD AUTO: 0.03 10*3/MM3 (ref 0–0.03)
IMM GRANULOCYTES NFR BLD AUTO: 0.2 % (ref 0–0.6)
INR PPP: 1.09 (ref 0.91–1.09)
LYMPHOCYTES # BLD AUTO: 0.79 10*3/MM3 (ref 0.6–4.8)
LYMPHOCYTES NFR BLD AUTO: 6.3 % (ref 24–44)
MAGNESIUM SERPL-MCNC: 1.6 MG/DL (ref 1.3–2.7)
MCH RBC QN AUTO: 29.7 PG (ref 27–31)
MCHC RBC AUTO-ENTMCNC: 32.9 G/DL (ref 32–36)
MCV RBC AUTO: 90.4 FL (ref 80–99)
MONOCYTES # BLD AUTO: 0.85 10*3/MM3 (ref 0–1)
MONOCYTES NFR BLD AUTO: 6.8 % (ref 0–12)
NEUTROPHILS # BLD AUTO: 10.9 10*3/MM3 (ref 1.5–8.3)
NEUTROPHILS NFR BLD AUTO: 86.9 % (ref 41–71)
PLATELET # BLD AUTO: 232 10*3/MM3 (ref 150–450)
PMV BLD AUTO: 9.6 FL (ref 6–12)
POTASSIUM BLD-SCNC: 4.2 MMOL/L (ref 3.5–5.5)
PROTHROMBIN TIME: 11.4 SECONDS (ref 9.6–11.5)
RBC # BLD AUTO: 3.33 10*6/MM3 (ref 4.2–5.76)
SODIUM BLD-SCNC: 133 MMOL/L (ref 132–146)
WBC NRBC COR # BLD: 12.54 10*3/MM3 (ref 3.5–10.8)

## 2019-01-01 PROCEDURE — 25010000002 MORPHINE PER 10 MG: Performed by: SURGERY

## 2019-01-01 PROCEDURE — 85610 PROTHROMBIN TIME: CPT | Performed by: SURGERY

## 2019-01-01 PROCEDURE — 25010000002 ONDANSETRON PER 1 MG: Performed by: SURGERY

## 2019-01-01 PROCEDURE — 94640 AIRWAY INHALATION TREATMENT: CPT

## 2019-01-01 PROCEDURE — 83036 HEMOGLOBIN GLYCOSYLATED A1C: CPT | Performed by: NURSE PRACTITIONER

## 2019-01-01 PROCEDURE — 85025 COMPLETE CBC W/AUTO DIFF WBC: CPT | Performed by: SURGERY

## 2019-01-01 PROCEDURE — 83735 ASSAY OF MAGNESIUM: CPT | Performed by: SURGERY

## 2019-01-01 PROCEDURE — 82150 ASSAY OF AMYLASE: CPT | Performed by: SURGERY

## 2019-01-01 PROCEDURE — 97162 PT EVAL MOD COMPLEX 30 MIN: CPT

## 2019-01-01 PROCEDURE — 99232 SBSQ HOSP IP/OBS MODERATE 35: CPT | Performed by: FAMILY MEDICINE

## 2019-01-01 PROCEDURE — 80048 BASIC METABOLIC PNL TOTAL CA: CPT | Performed by: SURGERY

## 2019-01-01 PROCEDURE — 25010000002 ENOXAPARIN PER 10 MG: Performed by: SURGERY

## 2019-01-01 PROCEDURE — 94799 UNLISTED PULMONARY SVC/PX: CPT

## 2019-01-01 PROCEDURE — 25010000002 PIPERACILLIN SOD-TAZOBACTAM PER 1 G: Performed by: SURGERY

## 2019-01-01 PROCEDURE — 82962 GLUCOSE BLOOD TEST: CPT

## 2019-01-01 RX ORDER — SCOLOPAMINE TRANSDERMAL SYSTEM 1 MG/1
1 PATCH, EXTENDED RELEASE TRANSDERMAL
Status: DISCONTINUED | OUTPATIENT
Start: 2019-01-01 | End: 2019-01-15 | Stop reason: HOSPADM

## 2019-01-01 RX ORDER — MAGNESIUM SULFATE HEPTAHYDRATE 40 MG/ML
2 INJECTION, SOLUTION INTRAVENOUS AS NEEDED
Status: DISCONTINUED | OUTPATIENT
Start: 2019-01-01 | End: 2019-01-15 | Stop reason: HOSPADM

## 2019-01-01 RX ORDER — ACETAMINOPHEN 160 MG/5ML
1000 SOLUTION ORAL EVERY 6 HOURS SCHEDULED
Status: DISCONTINUED | OUTPATIENT
Start: 2019-01-01 | End: 2019-01-04

## 2019-01-01 RX ORDER — DEXTROSE AND SODIUM CHLORIDE 5; .9 G/100ML; G/100ML
75 INJECTION, SOLUTION INTRAVENOUS CONTINUOUS
Status: DISCONTINUED | OUTPATIENT
Start: 2019-01-01 | End: 2019-01-02

## 2019-01-01 RX ORDER — MORPHINE SULFATE 2 MG/ML
2 INJECTION, SOLUTION INTRAMUSCULAR; INTRAVENOUS EVERY 4 HOURS PRN
Status: DISCONTINUED | OUTPATIENT
Start: 2019-01-01 | End: 2019-01-05

## 2019-01-01 RX ORDER — MAGNESIUM SULFATE HEPTAHYDRATE 40 MG/ML
4 INJECTION, SOLUTION INTRAVENOUS AS NEEDED
Status: DISCONTINUED | OUTPATIENT
Start: 2019-01-01 | End: 2019-01-15 | Stop reason: HOSPADM

## 2019-01-01 RX ADMIN — MORPHINE SULFATE 2 MG: 2 INJECTION, SOLUTION INTRAMUSCULAR; INTRAVENOUS at 14:38

## 2019-01-01 RX ADMIN — ONDANSETRON 4 MG: 2 INJECTION INTRAMUSCULAR; INTRAVENOUS at 09:40

## 2019-01-01 RX ADMIN — INSULIN LISPRO 4 UNITS: 100 INJECTION, SOLUTION INTRAVENOUS; SUBCUTANEOUS at 16:52

## 2019-01-01 RX ADMIN — TAZOBACTAM SODIUM AND PIPERACILLIN SODIUM 3.38 G: 375; 3 INJECTION, SOLUTION INTRAVENOUS at 17:00

## 2019-01-01 RX ADMIN — FLUOXETINE HYDROCHLORIDE 20 MG: 20 CAPSULE ORAL at 09:39

## 2019-01-01 RX ADMIN — DEXTROSE AND SODIUM CHLORIDE 75 ML/HR: 5; 900 INJECTION, SOLUTION INTRAVENOUS at 19:38

## 2019-01-01 RX ADMIN — BUDESONIDE 0.5 MG: 0.5 INHALANT RESPIRATORY (INHALATION) at 09:47

## 2019-01-01 RX ADMIN — PREGABALIN 75 MG: 75 CAPSULE ORAL at 09:39

## 2019-01-01 RX ADMIN — ONDANSETRON 4 MG: 2 INJECTION INTRAMUSCULAR; INTRAVENOUS at 04:47

## 2019-01-01 RX ADMIN — SCOPALAMINE 1 PATCH: 1 PATCH, EXTENDED RELEASE TRANSDERMAL at 11:39

## 2019-01-01 RX ADMIN — FLECAINIDE ACETATE 50 MG: 50 TABLET ORAL at 09:39

## 2019-01-01 RX ADMIN — MORPHINE SULFATE 2 MG: 2 INJECTION, SOLUTION INTRAMUSCULAR; INTRAVENOUS at 22:31

## 2019-01-01 RX ADMIN — TAZOBACTAM SODIUM AND PIPERACILLIN SODIUM 3.38 G: 375; 3 INJECTION, SOLUTION INTRAVENOUS at 11:39

## 2019-01-01 RX ADMIN — MAGNESIUM SULFATE HEPTAHYDRATE 4 G: 40 INJECTION, SOLUTION INTRAVENOUS at 16:51

## 2019-01-01 RX ADMIN — INSULIN LISPRO 4 UNITS: 100 INJECTION, SOLUTION INTRAVENOUS; SUBCUTANEOUS at 09:39

## 2019-01-01 RX ADMIN — ENOXAPARIN SODIUM 40 MG: 40 INJECTION, SOLUTION INTRAVENOUS; SUBCUTANEOUS at 09:39

## 2019-01-01 RX ADMIN — BUDESONIDE 0.5 MG: 0.5 INHALANT RESPIRATORY (INHALATION) at 20:32

## 2019-01-01 RX ADMIN — INSULIN LISPRO 2 UNITS: 100 INJECTION, SOLUTION INTRAVENOUS; SUBCUTANEOUS at 13:35

## 2019-01-01 RX ADMIN — MORPHINE SULFATE 2 MG: 2 INJECTION, SOLUTION INTRAMUSCULAR; INTRAVENOUS at 18:06

## 2019-01-01 RX ADMIN — ACETAMINOPHEN 1000 MG: 650 SOLUTION ORAL at 18:06

## 2019-01-01 RX ADMIN — INSULIN LISPRO 2 UNITS: 100 INJECTION, SOLUTION INTRAVENOUS; SUBCUTANEOUS at 21:27

## 2019-01-01 RX ADMIN — DEXTROSE AND SODIUM CHLORIDE 75 ML/HR: 5; 900 INJECTION, SOLUTION INTRAVENOUS at 09:41

## 2019-01-01 RX ADMIN — COLCHICINE 0.6 MG: 0.6 TABLET, FILM COATED ORAL at 09:39

## 2019-01-01 NOTE — THERAPY EVALUATION
Acute Care - Physical Therapy Initial Evaluation  [facility]     Patient Name: Todd Phillips  : 1950  MRN: [id number]  Today's Date: 2019   Onset of Illness/Injury or Date of Surgery: 18  Date of Referral to PT: 18  Referring Physician: Mary Grace      Admit Date: 2018    Visit Dx:     ICD-10-CM ICD-9-CM   1. Impaired functional mobility, balance, gait, and endurance Z74.09 V49.89   2. Laboratory test Z01.89 V72.60   3. Pancreatic cancer (CMS/HCC) C25.9 157.9     Patient Active Problem List   Diagnosis   • Hyperlipidemia   • COPD (chronic obstructive pulmonary disease) (CMS/HCC)   • Essential hypertension   • Gout without tophus   • Anxiety and depression   • Primary insomnia   • Gastroesophageal reflux disease without esophagitis   • Nonobstructive atherosclerosis of coronary artery   • CKD stage 3 secondary to diabetes (CMS/HCC)   • DM2 (diabetes mellitus, type 2) (CMS/HCC)   • Paroxysmal atrial fibrillation   • Chronic anticoagulation, eliquis   • Encounter for monitoring flecainide therapy   • Malignant neoplasm of head of pancreas (CMS/HCC)   • Bacteremia due to Escherichia coli   • Biliary obstruction   • Thrombocytopenia (CMS/HCC)   • Anemia due to chemotherapy   • Fever   • Laboratory test     Past Medical History:   Diagnosis Date   • Antral gastritis    • Asthma    • Atrial fibrillation (CMS/HCC)     treated with oral blood thinner   • Chest pain    • COPD (chronic obstructive pulmonary disease) (CMS/HCC)    • Coronary artery disease     no stents   • Diabetes mellitus (CMS/HCC)     type 2 - checks sugar 4 times per day    • Duodenitis    • Elevated cholesterol    • Emphysema of lung (CMS/HCC)    • Gallbladder disease     removed    • GERD (gastroesophageal reflux disease)    • Hard to intubate     busted lip last time   • Hyperlipidemia    • Hypertension    • Jaundice    • Kidney stone    • Kidney stones     had lithotripsy and passed 36 kidney stones as well as had one  surgically removed.   • Malignant neoplasm of head of pancreas (CMS/HCC) 9/5/2018   • Nausea    • Obstructive sleep apnea on CPAP     compliant with machine    • Palpitations    • Pneumonia 08/2018   • Reflux esophagitis    • Renal failure     stage 3 kidney disease   • Sepsis (CMS/HCC)      Past Surgical History:   Procedure Laterality Date   • BILE DUCT STENT PLACEMENT      [facility] 2 weeks ago    • CARDIAC CATHETERIZATION  11/01/1999   • CARDIOVASCULAR STRESS TEST  09/2012   • CHOLECYSTECTOMY N/A 8/10/2018    Procedure: CHOLECYSTECTOMY LAPAROSCOPIC;  Surgeon: Ronak Downs MD;  Location: [facility] OR;  Service: General   • COLONOSCOPY     • CYSTOSCOPY BLADDER STONE LITHOTRIPSY     • ECHO - CONVERTED  09/2012   • ERCP N/A 8/14/2018    Procedure: ENDOSCOPIC RETROGRADE CHOLANGIOPANCREATOGRAPHY WITH PAPILLOTOMY;  Surgeon: Scot Su MD;  Location: [facility] OR;  Service: Gastroenterology   • ERCP N/A 10/1/2018    Procedure: ENDOSCOPIC RETROGRADE CHOLANGIOPANCREATOGRAPHY;  Surgeon: Jefry Luna MD;  Location: [facility] ENDOSCOPY;  Service: Gastroenterology   • KIDNEY STONE SURGERY     • KIDNEY STONE SURGERY      open abdominal surgery   • PORTACATH PLACEMENT Right 10/23/2018    Procedure: INSERTION OF PORTACATH;  Surgeon: Ronak Downs MD;  Location: [facility] OR;  Service: General   • TONSILLECTOMY     • UPPER GASTROINTESTINAL ENDOSCOPY  08/30/2012        PT ASSESSMENT (last 12 hours)      Physical Therapy Evaluation     Row Name 01/01/19 1325          PT Evaluation Time/Intention    Patient Effort  poor  -SJ     Symptoms Noted During/After Treatment  none  -SJ     Row Name 01/01/19 1325          General Information    Patient Profile Reviewed?  yes  -SJ     Onset of Illness/Injury or Date of Surgery  12/31/18  -     Referring Physician  Mary Grace  -     Patient Observations  obtunded;poorly cooperative;decreased LOC  -SJ     General Observations of Patient  Pt in bed,  restless, has multiple drains, tubes  -SJ     Prior Level of Function  independent:;all household mobility;community mobility;gait;transfer;bed mobility;ADL's  -SJ     Equipment Currently Used at Home  walker, rolling;wheelchair  -SJ     Pertinent History of Current Functional Problem  68 y.o.M with hx of pancreatic cx, s/p Whipple procedure, liver biopsy, portal vein resection and repair, G/J tube placement  -SJ     Existing Precautions/Restrictions  fall  -SJ     Limitations/Impairments  safety/cognitive  -SJ     Risks Reviewed  patient and family:;increased discomfort  -SJ     Benefits Reviewed  patient and family:;improve function;increase independence;increase strength  -SJ     Barriers to Rehab  medically complex;cognitive status  -SJ     Row Name 01/01/19 1325          Relationship/Environment    Primary Source of Support/Comfort  spouse;child(cuauhtemoc)  -     Lives With  spouse  -     Primary Roles/Responsibilities  retired  -     Concerns About Impact on Relationships  wife available 24/7, children available prn  -SJ     Row Name 01/01/19 1325          Resource/Environmental Concerns    Current Living Arrangements  home/apartment/condo  -     Resource/Environmental Concerns  none  -SJ     Row Name 01/01/19 1325          Cognitive Assessment/Intervention- PT/OT    Orientation Status (Cognition)  disoriented to;person;place;situation;time  -SJ     Follows Commands (Cognition)  follows one step commands;0-24% accuracy  -     Safety Deficit (Cognitive)  severe deficit;ability to follow commands;awareness of need for assistance;insight into deficits/self awareness;impulsivity  -SJ     Row Name 01/01/19 1325          Safety Issues, Functional Mobility    Safety Issues Affecting Function (Mobility)  ability to follow commands;awareness of need for assistance;impulsivity;insight into deficits/self awareness;sequencing abilities  -     Impairments Affecting Function (Mobility)  cognition;pain  -     Row  Name 01/01/19 1325          Bed Mobility Assessment/Treatment    Bed Mobility Assessment/Treatment  rolling left;rolling right;scooting/bridging  -     Rolling Left [place]kin (Bed Mobility)  dependent (less than 25% patient effort);2 person assist;verbal cues  -SJ     Rolling Right [place]kin (Bed Mobility)  dependent (less than 25% patient effort);2 person assist;verbal cues  -SJ     Scooting/Bridging Wilkin (Bed Mobility)  dependent (less than 25% patient effort);2 person assist;verbal cues  -SJ     Bed Mobility, Safety Issues  cognitive deficits limit understanding;decreased use of arms for pushing/pulling;decreased use of legs for bridging/pushing;impaired trunk control for bed mobility  -     Assistive Device (Bed Mobility)  bed rails;draw sheet  -     Comment (Bed Mobility)  pt unable to follow commands  -     Row Name 01/01/19 1325          Transfer Assessment/Treatment    Comment (Transfers)  OOB  mobility deferred due to cognitive status  -     Row Name 01/01/19 1325          General ROM    GENERAL ROM COMMENTS  BLE's WFL  -[facility] Name 01/01/19 1325          MMT (Manual Muscle Testing)    General MMT Comments  BLE's difficult to assess due to cog status, but grossly appears at least 4/5  -[facility] Name 01/01/19 1325          Pain Assessment    Additional Documentation  Pain Scale: FACES Pre/Post-Treatment (Group)  -[facility] Name 01/01/19 1325          Pain Scale: FACES Pre/Post-Treatment    Pain: FACES Scale, Pretreatment  6-->hurts even more  -     Pain: FACES Scale, Post-Treatment  6-->hurts even more  -     Pre/Post Treatment Pain Comment  difficult to assess  -     Row Name 01/01/19 1325          Coping    Observed Emotional State  restless  -     Row Name 01/01/19 1325          Plan of Care Review    Plan of Care Reviewed With  patient  -     Row Name 01/01/19 1325          Physical Therapy Clinical Impression    Date of Referral to PT  12/31/18  -     PT  Diagnosis (PT Clinical Impression)  impaired mobility  -SJ     Patient/Family Goals Statement (PT Clinical Impression)  family wishes him to return home  -SJ     Criteria for Skilled Interventions Met (PT Clinical Impression)  yes;treatment indicated  -SJ     Pathology/Pathophysiology Noted (Describe Specifically for Each System)  musculoskeletal  -SJ     Impairments Found (describe specific impairments)  arousal, attention, and cognition;gait, locomotion, and balance  -SJ     Functional Limitations in Following Categories (Describe Specific Limitations)  self-care  -SJ     Rehab Potential (PT Clinical Summary)  fair, will monitor progress closely  -SJ     Predicted Duration of Therapy (PT)  2wks  -SJ     Care Plan Review (PT)  evaluation/treatment results reviewed;care plan/treatment goals reviewed;risks/benefits reviewed;current/potential barriers reviewed;patient/other agree to care plan  -SJ     Care Plan Review, Other Participant (PT Clinical Impression)  spouse  -SJ     Row Name 01/01/19 1325          Vital Signs    Pre Systolic BP Rehab  152  -SJ     Pre Treatment Diastolic BP  76  -SJ     Pretreatment Heart Rate (beats/min)  118  -SJ     Pre SpO2 (%)  92  -SJ     O2 Delivery Pre Treatment  room air  -SJ     Pre Patient Position  Supine  -SJ     Intra Patient Position  Supine  -SJ     Post Patient Position  Supine  -SJ     Row Name 01/01/19 1325          Physical Therapy Goals    Bed Mobility Goal Selection (PT)  bed mobility, PT goal 1  -SJ     Transfer Goal Selection (PT)  transfer, PT goal 1  -SJ     Gait Training Goal Selection (PT)  gait training, PT goal 1  -SJ     Row Name 01/01/19 1325          Bed Mobility Goal 1 (PT)    Activity/Assistive Device (Bed Mobility Goal 1, PT)  rolling to left;rolling to right;sit to supine/supine to sit  -SJ     [place] Level/Cues Needed (Bed Mobility Goal 1, PT)  minimum assist (75% or more patient effort)  -SJ     Time Frame (Bed Mobility Goal 1, PT)  2 weeks;long  term goal (LTG)  -     Row Name 01/01/19 1325          Transfer Goal 1 (PT)    Activity/Assistive Device (Transfer Goal 1, PT)  sit-to-stand/stand-to-sit;bed-to-chair/chair-to-bed  -SJ     [place] Level/Cues Needed (Transfer Goal 1, PT)  minimum assist (75% or more patient effort)  -SJ     Time Frame (Transfer Goal 1, PT)  2 weeks;long term goal (LTG)  -     Row Name 01/01/19 1325          Gait Training Goal 1 (PT)    Activity/Assistive Device (Gait Training Goal 1, PT)  walker, rolling  -SJ     [place] Level (Gait Training Goal 1, PT)  minimum assist (75% or more patient effort)  -SJ     Distance (Gait Goal 1, PT)  200  -SJ     Time Frame (Gait Training Goal 1, PT)  2 weeks;long term goal (LTG)  -     Row Name 01/01/19 1325          Positioning and Restraints    Pre-Treatment Position  in bed  -SJ     Post Treatment Position  bed  -SJ     In Bed  notified nsg;supine;call light within reach;encouraged to call for assist;exit alarm on;with family/caregiver  -SJ       User Key  (r) = Recorded By, (t) = Taken By, (c) = Cosigned By    Initials Name Provider Type    Reta Chen PT Physical Therapist        Physical Therapy Education     Title: PT OT SLP Therapies (In Progress)     Topic: Physical Therapy (In Progress)     Point: Mobility training (In Progress)     Learning Progress Summary           Patient Acceptance, E,D, NR by  at 1/1/2019  2:22 PM   Significant Other Acceptance, E,D, NR by ILEANA at 1/1/2019  2:22 PM                   Point: Home exercise program (In Progress)     Learning Progress Summary           Patient Acceptance, E,D, NR by  at 1/1/2019  2:22 PM   Significant Other Acceptance, E,D, NR by ILEANA at 1/1/2019  2:22 PM                   Point: Body mechanics (In Progress)     Learning Progress Summary           Patient Acceptance, E,D, NR by  at 1/1/2019  2:22 PM   Significant Other Acceptance, E,D, NR by  at 1/1/2019  2:22 PM                   Point: Precautions (In  Progress)     Learning Progress Summary           Patient Acceptance, E,D, NR by  at 1/1/2019  2:22 PM   Significant Other Acceptance, E,D, NR by  at 1/1/2019  2:22 PM                               User Key     Initials Effective Dates Name Provider Type Discipline     06/19/15 -  Reta Iqbal, PT Physical Therapist PT              PT Recommendation and Plan  Anticipated Discharge Disposition (PT): inpatient rehabilitation facility(pending progress)  Planned Therapy Interventions (PT Eval): balance training, bed mobility training, gait training, home exercise program, patient/family education, strengthening, transfer training  Therapy Frequency (PT Clinical Impression): 3 times/wk  Outcome Summary/Treatment Plan (PT)  Anticipated Equipment Needs at Discharge (PT): (TBD)  Anticipated Discharge Disposition (PT): inpatient rehabilitation facility(pending progress)  Plan of Care Reviewed With: patient  Outcome Summary: PT eval completed. Pt presents obtunded, confused, with decreased functional mobility independence s/p surgery. Pt olga PROM to BLE's and BUE's in all planes. OOB mobility deferred due to safety concerns. PT to follow 3x/wk until able to participate more, recommend d/c to IRF pending progress.  Outcome Measures     Row Name 01/01/19 6885             How much help from another person do you currently need...    Turning from your back to your side while in flat bed without using bedrails?  1  -SJ      Moving from lying on back to sitting on the side of a flat bed without bedrails?  1  -SJ      Moving to and from a bed to a chair (including a wheelchair)?  1  -SJ      Standing up from a chair using your arms (e.g., wheelchair, bedside chair)?  1  -SJ      Climbing 3-5 steps with a railing?  1  -SJ      To walk in hospital room?  1  -SJ      AM-PAC 6 Clicks Score  6  -SJ         Functional Assessment    Outcome Measure Options  AM-PAC 6 Clicks Basic Mobility (PT)  -        User Key  (r) = Recorded  By, (t) = Taken By, (c) = Cosigned By    Initials Name Provider Type    Reta Chen, PT Physical Therapist         Time Calculation:   PT Charges     Row Name 01/01/19 1423             Time Calculation    Start Time  1325  -      PT Received On  01/01/19  -      PT Goal Re-Cert Due Date  01/11/19  -        User Key  (r) = Recorded By, (t) = Taken By, (c) = Cosigned By    Initials Name Provider Type     Reta Iqabl, PT Physical Therapist        Therapy Suggested Charges     Code   Minutes Charges    None           Therapy Charges for Today     Code Description Service Date Service Provider Modifiers Qty    30378122913 HC PT EVAL MOD COMPLEXITY 4 1/1/2019 Reta Iqbal, PT GP 1          PT G-Codes  Outcome Measure Options: AM-PAC 6 Clicks Basic Mobility (PT)  AM-PAC 6 Clicks Score: 6      Reta Iqbal PT  1/1/2019

## 2019-01-01 NOTE — PROGRESS NOTES
[facility] Medicine Services  PROGRESS NOTE    Patient Name: Todd Phillips  : 1950  MRN: [id number]    Date of Admission: 2018  Length of Stay: 1  Primary Care Physician: Adiel Denney MD    Subjective   Subjective     CC:  Pancreatic cancer s/p whipple 18, DM2    HPI:  Oversedated today, thought to be due to anesthesia meds given yesterday as had similar episode in past with sedative meds. Will awaken, wife at bedside.  (+) BM x1 today.  Belching x2 noted while at bedside.     ROS:  Otherwise ROS is negative except as mentioned in the HPI.    Objective   Objective     Vital Signs:   Temp:  [97.1 °F (36.2 °C)-99.4 °F (37.4 °C)] 97.3 °F (36.3 °C)  Heart Rate:  [] 109  Resp:  [14-18] 18  BP: (118-154)/(76-98) 152/76  Arterial Line BP: (147-158)/(54-73) 153/54        Physical Exam:  Constitutional: No acute distress, awake, alert, nontoxic, overweight body habitus  Respiratory: Clear to auscultation bilaterally but decreased bases due to poor effort, nonlabored respirations   Cardiovascular: RRR, no murmur  Gastrointestinal: Positive but infrequent bowel sounds, soft, nondistended, did not palpate deeply due to post-op  Musculoskeletal: No peripheral edema, normal muscle tone for age  Psychiatric: Groggy but will awaken briefly  Skin: No rashes, no jaundice, no petechiae, no mottling      Results Reviewed:  I have personally reviewed current lab, radiology, and data and agree.    Results from last 7 days   Lab Units  19   0542  18   1344  18   1301   WBC 10*3/mm3  12.54*  7.81  6.59   HEMOGLOBIN g/dL  9.9*  10.3*  12.0*   HEMATOCRIT %  30.1*  31.1*  37.3*   PLATELETS 10*3/mm3  232  187  254   INR   1.09   --    --      Results from last 7 days   Lab Units  19   0542  18   1343  18   1301   SODIUM mmol/L  133  130*  135   POTASSIUM mmol/L  4.2  5.3  4.4   CHLORIDE mmol/L  101  102  100   CO2 mmol/L  22.0  21.0  26.8   BUN  mg/dL  22  23  22*   CREATININE mg/dL  1.03  1.20  1.24   GLUCOSE mg/dL  199*  239*  144*   CALCIUM mg/dL  8.3*  8.4*  9.7   ALT (SGPT) U/L   --   28  12   AST (SGOT) U/L   --   47*  18     Estimated Creatinine Clearance: 86.4 mL/min (by C-G formula based on SCr of 1.03 mg/dL).  No results found for: BNP    Microbiology Results Abnormal     None          Imaging Results (last 24 hours)     ** No results found for the last 24 hours. **        Results for orders placed during the hospital encounter of 10/22/17   Adult Transthoracic Echo Complete W/ Cont if Necessary Per Protocol    Narrative · Left ventricular systolic function is normal. Estimated EF = 60%.  · The cardiac valves are anatomically and functionally normal.          I have reviewed the medications.    Current Facility-Administered Medications:   •  acetaminophen (TYLENOL) tablet 1,000 mg, 1,000 mg, Oral, Q6H, Miquel Brody MD  •  albuterol (PROVENTIL) nebulizer solution 0.083% 2.5 mg/3mL, 2.5 mg, Nebulization, Q4H PRN, Miquel Brody MD  •  albuterol (PROVENTIL) nebulizer solution 0.083% 2.5 mg/3mL, 2.5 mg, Nebulization, BID PRN, Miquel Brody MD  •  allopurinol (ZYLOPRIM) tablet 100 mg, 100 mg, Oral, Nightly, Miquel Brody MD  •  budesonide (PULMICORT) nebulizer solution 0.5 mg, 0.5 mg, Nebulization, BID - RT, Miquel Brody MD, 0.5 mg at 01/01/19 0947  •  colchicine tablet 0.6 mg, 0.6 mg, Oral, Daily, Miquel Brody MD, 0.6 mg at 01/01/19 0939  •  dextrose 5 % and sodium chloride 0.9 % infusion, 75 mL/hr, Intravenous, Continuous, Miquel Brody MD, Last Rate: 75 mL/hr at 01/01/19 0941, 75 mL/hr at 01/01/19 0941  •  enoxaparin (LOVENOX) syringe 40 mg, 40 mg, Subcutaneous, Q24H, Miquel Brody MD, 40 mg at 01/01/19 0939  •  flecainide (TAMBOCOR) tablet 50 mg, 50 mg, Oral, Q12H, Miquel Brody MD, 50 mg at 01/01/19 0939  •  FLUoxetine (PROzac) capsule 20 mg, 20  mg, Oral, BID, Miquel Brody MD, 20 mg at 01/01/19 0939  •  HYDROmorphone (DILAUDID) PCA 1 mg/mL syringe, , Intravenous, Continuous, Miquel Brody MD  •  insulin lispro (humaLOG) injection 0-9 Units, 0-9 Units, Subcutaneous, 4x Daily With Meals & Nightly, Miquel Brody MD, 4 Units at 01/01/19 0939  •  montelukast (SINGULAIR) tablet 10 mg, 10 mg, Oral, Nightly, Miquel Brody MD  •  naloxone (NARCAN) injection 0.1 mg, 0.1 mg, Intravenous, Q5 Min PRN, Miquel Brody MD  •  nitroglycerin (NITROSTAT) SL tablet 0.4 mg, 0.4 mg, Sublingual, PRN, Miquel Brody MD  •  ondansetron (ZOFRAN) injection 4 mg, 4 mg, Intravenous, Q6H PRN, Miquel Brody MD, 4 mg at 01/01/19 0940  •  pantoprazole (PROTONIX) EC tablet 40 mg, 40 mg, Oral, QAM, Miquel Brody MD, Stopped at 01/01/19 0801  •  piperacillin-tazobactam (ZOSYN) 3.375 g in iso-osmotic dextrose 50 ml (premix), 3.375 g, Intravenous, Once, Miquel Brody MD, 3.375 g at 01/01/19 1139  •  piperacillin-tazobactam (ZOSYN) 3.375 g in iso-osmotic dextrose 50 ml (premix), 3.375 g, Intravenous, Q8H, Miquel Brody MD  •  pregabalin (LYRICA) capsule 75 mg, 75 mg, Oral, Daily, Miquel Brody MD, 75 mg at 01/01/19 0939  •  promethazine (PHENERGAN) injection 12.5 mg, 12.5 mg, Intravenous, Q6H PRN, Miquel Brody MD  •  promethazine (PHENERGAN) injection 6.25 mg, 6.25 mg, Intravenous, Q8H PRN, Miquel Brody MD  •  Scopolamine (TRANSDERM-SCOP) 1.5 MG/3DAYS patch 1 patch, 1 patch, Transdermal, Q72H, Miquel Brody MD, 1 patch at 01/01/19 1139  •  sennosides-docusate sodium (SENOKOT-S) 8.6-50 MG tablet 2 tablet, 2 tablet, Oral, Nightly, Miquel Brody MD    Facility-Administered Medications Ordered in Other Encounters:   •  sodium chloride 0.9 % infusion  - ADS Override Pull, , , ,   •  sodium chloride 0.9 % infusion  - ADS Override Pull, ,  , ,     Assessment/Plan   Assessment / Plan     Active Hospital Problems    Diagnosis Date Noted   • **Malignant neoplasm of head of pancreas (CMS/[facility]) [C25.0] 09/05/2018     Added automatically from request for surgery [id number]     • Laboratory test [Z01.89] 12/31/2018   • Chronic anticoagulation, eliquis [Z79.01] 10/27/2017   • Paroxysmal atrial fibrillation [I48.91] 10/22/2017   • DM2 (diabetes mellitus, type 2) (CMS/[facility]) [E11.9] 10/22/2017   • Gastroesophageal reflux disease without esophagitis [K21.9] 02/09/2017   • COPD (chronic obstructive pulmonary disease) (CMS/[facility]) [J44.9] 06/16/2016   • Hyperlipidemia [E78.5] 06/16/2016   • Essential hypertension [I10] 06/16/2016            Brief Hospital Course to date:  Todd Phillisp is a 68 y.o. male with a past medical history significant for pancreatic cancer, COPD, diabetes mellitus type 2, PAF, GERD, essential hypertension and hyperlipidemia who was admitted by Dr. Rivas 12/31/18 and underwent planned whipple due to advance pancreatic cancer.    - expectant post-op management per Dr. Rivas  - PCA turned off, monitor for over-sedation  - FSBS reasonably controlled with current SSI, if become reliably >200 will add basal low dose Levemir  - finishing doses of per-operative Zosyn      DVT Prophylaxis:  Lovenox    Disposition: I expect the patient to be discharged TBS pending post-op course at Dr. Rivas's discretion.    CODE STATUS:   Code Status and Medical Interventions:   Ordered at: 12/31/18 1604     Level Of Support Discussed With:    Patient     Code Status:    CPR     Medical Interventions (Level of Support Prior to Arrest):    Full           Electronically signed by Andreina Beltran MD, 01/01/19, 11:50 AM.

## 2019-01-01 NOTE — PROGRESS NOTES
"Night of Surgery Note    S/P Whipple  Sleepy but otherwise stable  /94 (BP Location: Right arm, Patient Position: Lying)   Pulse 104   Temp 98.2 °F (36.8 °C) (Oral)   Resp 18   Ht 182.9 cm (72\")   Wt 91.5 kg (201 lb 12.8 oz)   SpO2 93%   BMI 27.37 kg/m²     Drains: Serous sanguinous and low output    General: NAD  Wound: Dry dressing   Abdomen: Minimal distention and minimal tenderness  Assessment and plan:    Stable post operative course.  "

## 2019-01-01 NOTE — PLAN OF CARE
Problem: Patient Care Overview  Goal: Plan of Care Review  Outcome: Ongoing (interventions implemented as appropriate)   01/01/19 1514   OTHER   Outcome Summary Patient is too sedated to ambulate or work with PT. Was able to get patient to chair with 4 person assist. Due to safety concerns, a lift was utilized to get patient from chair to bed. Unable to swallow safely. Arouses to voice and gentle shaking, alert to person but unable to say where he is or his birthdate. Frequently falling asleep between nuero assessments but very restless. PCA pump is being held for sedation currently per surgery, Morphine added PRN for pain. Patient has shown improvement in alertness since holding PCA. Hassan cath discontinued per protocol.Passing flatus and belching.Small bowel movement today. VSS. Will continue to monitor very closely and follow up with surgery again as needed.    Coping/Psychosocial   Plan of Care Reviewed With patient;family   Plan of Care Review   Progress no change      01/01/19 1267          Coping/Psychosocial   Plan of Care Reviewed With patient;family   Plan of Care Review   Progress no change       Problem: Fall Risk (Adult)  Goal: Identify Related Risk Factors and Signs and Symptoms  Outcome: Outcome(s) achieved Date Met: 01/01/19

## 2019-01-01 NOTE — CONSULTS
[facility] Medicine Services  CONSULT NOTE      Patient Name: Todd Phillips  : 1950  MRN: [id number]    Primary Care Physician: Adiel Denney MD  Provider requesting consultation: Miquel Rivas,*    Subjective   Subjective     Reason for Consultation:  High blood glucose      HPI:  Todd Phillips is a 68 y.o. male with a past medical history significant for pancreatic cancer, COPD, diabetes mellitus type 2, PAF, GERD, essential hypertension and hyperlipidemia who was admitted by Dr. Rivas for planned whipple due to advance pancreatic cancer.  hospitalist were consulted for diabetes management.  Wife states that at home patient's blood glucose typically averages 150-160.  He administers Humalog per sliding scale.  Since hospitalized her blood glucose has ranged from 247-162.  He is currently receiving sliding scale humalog 0-9 units.        Review of Systems   Unable to perform ROS: Other (sedation, s/p whipple)     Otherwise 10-system ROS reviewed and is negative except as mentioned in the HPI.    Past Medical History:   Diagnosis Date   • Antral gastritis    • Asthma    • Atrial fibrillation (CMS/HCC)     treated with oral blood thinner   • Chest pain    • COPD (chronic obstructive pulmonary disease) (CMS/HCC)    • Coronary artery disease     no stents   • Diabetes mellitus (CMS/HCC)     type 2 - checks sugar 4 times per day    • Duodenitis    • Elevated cholesterol    • Emphysema of lung (CMS/HCC)    • Gallbladder disease     removed    • GERD (gastroesophageal reflux disease)    • Hard to intubate     busted lip last time   • Hyperlipidemia    • Hypertension    • Jaundice    • Kidney stone    • Kidney stones     had lithotripsy and passed 36 kidney stones as well as had one surgically removed.   • Malignant neoplasm of head of pancreas (CMS/HCC) 2018   • Nausea    • Obstructive sleep apnea on CPAP     compliant with machine    • Palpitations    •  Pneumonia 08/2018   • Reflux esophagitis    • Renal failure     stage 3 kidney disease   • Sepsis (CMS/HCC)        Past Surgical History:   Procedure Laterality Date   • BILE DUCT STENT PLACEMENT      [facility] 2 weeks ago    • CARDIAC CATHETERIZATION  11/01/1999   • CARDIOVASCULAR STRESS TEST  09/2012   • CHOLECYSTECTOMY N/A 8/10/2018    Procedure: CHOLECYSTECTOMY LAPAROSCOPIC;  Surgeon: Ronak Downs MD;  Location: [facility] OR;  Service: General   • COLONOSCOPY     • CYSTOSCOPY BLADDER STONE LITHOTRIPSY     • ECHO - CONVERTED  09/2012   • ERCP N/A 8/14/2018    Procedure: ENDOSCOPIC RETROGRADE CHOLANGIOPANCREATOGRAPHY WITH PAPILLOTOMY;  Surgeon: Scot Su MD;  Location:  [facility] OR;  Service: Gastroenterology   • ERCP N/A 10/1/2018    Procedure: ENDOSCOPIC RETROGRADE CHOLANGIOPANCREATOGRAPHY;  Surgeon: Jefry Luna MD;  Location:  [facility];  Service: Gastroenterology   • KIDNEY STONE SURGERY     • KIDNEY STONE SURGERY      open abdominal surgery   • PORTACATH PLACEMENT Right 10/23/2018    Procedure: INSERTION OF PORTACATH;  Surgeon: Ronak Downs MD;  Location: [facility] OR;  Service: General   • TONSILLECTOMY     • UPPER GASTROINTESTINAL ENDOSCOPY  08/30/2012       Family History: family history includes Diabetes in his sister and sister; Heart attack in his brother and mother; Heart disease in his brother and mother; Heart failure in his brother and mother; Kidney disease in his mother; Lung disease in his father; No Known Problems in his brother and brother; Stroke in his mother; Tuberculosis in his father. Otherwise pertinent FHx was reviewed and unremarkable.     Social History:  reports that  has never smoked. he has never used smokeless tobacco. He reports that he does not drink alcohol or use drugs.    Medications:  Medications Prior to Admission   Medication Sig Dispense Refill Last Dose   • allopurinol (ZYLOPRIM) 100 MG tablet Take 1 tablet by mouth  Daily. 90 tablet 0 12/30/2018 at 2100   • cholecalciferol (VITAMIN D3) 1000 units tablet Take 1,000 Units by mouth Daily.   12/30/2018 at 1200   • dronabinol (MARINOL) 5 MG capsule Take 1 capsule by mouth 2 (Two) Times a Day Before Meals. 60 capsule 2 12/30/2018 at 1700   • flecainide (TAMBOCOR) 50 MG tablet Take 1 tablet by mouth Every 12 (Twelve) Hours. 60 tablet 0 12/30/2018 at 2100   • FLUoxetine (PROzac) 20 MG capsule Take 20 mg by mouth 2 (Two) Times a Day.   12/30/2018 at 2100   • lansoprazole (PREVACID) 30 MG capsule Take 1 capsule by mouth twice daily (With Breakfast & Dinner). 60 capsule 0 12/30/2018 at 2100   • linaclotide (LINZESS) 145 MCG capsule capsule Take 1 capsule by mouth Every Morning Before Breakfast. 30 capsule 3 12/30/2018 at 0800   • mometasone (ASMANEX) 220 MCG/INH inhaler Inhale 2 puffs Every Night.   12/30/2018 at 2100   • montelukast (SINGULAIR) 10 MG tablet Take 10 mg by mouth Daily.   12/30/2018 at 2100   • Morphine (MS CONTIN) 30 MG 12 hr tablet Take 1 tablet by mouth Every 12 (Twelve) Hours. 60 tablet 0 12/30/2018 at 2200   • ondansetron (ZOFRAN) 8 MG tablet Take 1 tablet by mouth Every 8 (Eight) Hours As Needed for Nausea or Vomiting. 60 tablet 3 12/30/2018 at 2300   • promethazine (PHENERGAN) 12.5 MG tablet Take 1 tablet by mouth Every 6 (Six) Hours As Needed for Nausea or Vomiting. 90 tablet 3 12/31/2018 at 0330   • Tiotropium Bromide-Olodaterol 2.5-2.5 MCG/ACT aerosol solution Inhale 2 puffs Daily.   12/30/2018 at 2100   • albuterol (PROVENTIL HFA;VENTOLIN HFA) 108 (90 BASE) MCG/ACT inhaler Inhale 2 puffs Every 4 (Four) Hours As Needed for Wheezing or Shortness of Air.   12/27/2018   • albuterol (PROVENTIL) (2.5 MG/3ML) 0.083% nebulizer solution Take 2.5 mg by nebulization 2 (Two) Times a Day As Needed for Wheezing or Shortness of Air.   12/31/2018 at 0450   • apixaban (ELIQUIS) 5 MG tablet tablet Take 1 tablet by mouth Every 12 (Twelve) Hours. (Patient taking differently: Take 5  mg by mouth Every 12 (Twelve) Hours. Not instructed to stop pt states) 60 tablet 0 12/28/2018   • cefdinir (OMNICEF) 300 MG capsule Take 1 capsule by mouth Every 12 (Twelve) Hours for 10 days. 20 capsule 0 12/23/2018   • colchicine 0.6 MG tablet Take 1 tablet by mouth Daily. 90 tablet 2 12/10/2018   • insulin lispro (humaLOG) 100 UNIT/ML injection Inject  under the skin into the appropriate area as directed 3 (Three) Times a Day Before Meals.   12/30/2018   • LORazepam (ATIVAN) 0.5 MG tablet Take one to two tablets by mouth every 4 to 6 hours for nausea. (Patient taking differently: Take 0.5 mg by mouth Every 6 (Six) Hours As Needed for Anxiety. Take one to two tablets by mouth every 4 to 6 hours for nausea.) 120 tablet 0 12/29/2018   • metroNIDAZOLE (FLAGYL) 500 MG tablet Take 1 tablet by mouth Every 12 (Twelve) Hours for 10 days. 20 tablet 0 12/23/2018   • nitroglycerin (NITROSTAT) 0.4 MG SL tablet Place 0.4 mg under the tongue as needed for chest pain.   More than a month at Unknown time   • oxyCODONE (ROXICODONE) 5 MG immediate release tablet Take 5 mg by mouth Every 4 (Four) Hours As Needed for Moderate Pain .   12/28/2018   • polyethylene glycol (MIRALAX) packet Take 17 g by mouth Daily As Needed.   12/28/2018         Current Facility-Administered Medications:   •  acetaminophen (TYLENOL) tablet 1,000 mg, 1,000 mg, Oral, Q6H, Miquel Brody MD  •  albuterol (PROVENTIL) nebulizer solution 0.083% 2.5 mg/3mL, 2.5 mg, Nebulization, Q4H PRN, Miquel Brody MD  •  albuterol (PROVENTIL) nebulizer solution 0.083% 2.5 mg/3mL, 2.5 mg, Nebulization, BID PRN, Miquel Brody MD  •  allopurinol (ZYLOPRIM) tablet 100 mg, 100 mg, Oral, Nightly, Miquel Brody MD  •  budesonide (PULMICORT) nebulizer solution 0.5 mg, 0.5 mg, Nebulization, BID - RT, Miquel Brody MD, 0.5 mg at 12/1950  •  colchicine tablet 0.6 mg, 0.6 mg, Oral, Daily, Miquel Brody MD  •   cyclobenzaprine (FLEXERIL) tablet 10 mg, 10 mg, Oral, Q8H PRN, Miquel Brody MD  •  flecainide (TAMBOCOR) tablet 50 mg, 50 mg, Oral, Q12H, Miquel Brody MD  •  FLUoxetine (PROzac) capsule 20 mg, 20 mg, Oral, BID, Miquel Brody MD  •  HYDROmorphone (DILAUDID) PCA 1 mg/mL syringe, , Intravenous, Continuous, Miquel Brody MD  •  insulin lispro (humaLOG) injection 0-9 Units, 0-9 Units, Subcutaneous, 4x Daily With Meals & Nightly, Miquel Brody MD, 2 Units at 12/31/18 1859  •  montelukast (SINGULAIR) tablet 10 mg, 10 mg, Oral, Nightly, Miquel Brody MD  •  naloxone (NARCAN) injection 0.1 mg, 0.1 mg, Intravenous, Q5 Min PRN, Miquel Brody MD  •  nitroglycerin (NITROSTAT) SL tablet 0.4 mg, 0.4 mg, Sublingual, PRN, Miquel Brody MD  •  ondansetron (ZOFRAN) injection 4 mg, 4 mg, Intravenous, Q6H PRN, Miquel Brody MD  •  [START ON 1/1/2019] pantoprazole (PROTONIX) EC tablet 40 mg, 40 mg, Oral, QAM, Miquel Brody MD  •  pregabalin (LYRICA) capsule 75 mg, 75 mg, Oral, Daily, Miquel Brody MD  •  promethazine (PHENERGAN) injection 12.5 mg, 12.5 mg, Intravenous, Q6H PRN, Miquel Brody MD  •  promethazine (PHENERGAN) injection 6.25 mg, 6.25 mg, Intravenous, Q8H PRN, Miquel Brody MD  •  sennosides-docusate sodium (SENOKOT-S) 8.6-50 MG tablet 2 tablet, 2 tablet, Oral, Nightly, Miquel Brody MD  •  Sodium chloride 0.9 % infusion, 100 mL/hr, Intravenous, Continuous, Miquel Brody MD, Last Rate: 100 mL/hr at 12/31/18 1740, 100 mL/hr at 12/31/18 1740    Facility-Administered Medications Ordered in Other Encounters:   •  sodium chloride 0.9 % infusion  - ADS Override Pull, , , ,   •  sodium chloride 0.9 % infusion  - ADS Override Pull, , , ,     Allergies   Allergen Reactions   • Contrast Dye Other (See Comments)     Can't have due to kidney failure per family   •  Chlorhexidine Hives and Rash       Objective   Objective     Vital Signs:   Temp:  [97.1 °F (36.2 °C)-99.4 °F (37.4 °C)] 99.4 °F (37.4 °C)  Heart Rate:  [] 114  Resp:  [14-18] 16  BP: (118-154)/(77-98) 154/98  Arterial Line BP: (147-158)/(54-73) 153/54     Physical Exam     Constitutional: No acute distress. Sleeping with CPAP on   HENT: NCAT, mucous membranes moist  Respiratory: Clear to auscultation bilaterally, respiratory effort normal   Cardiovascular: RRR, no murmurs, rubs, or gallops,   Gastrointestinal: serous sanguinous from drains, incisional dressing noted, hypoactive bowel sounds   Musculoskeletal: No bilateral ankle edema  Psychiatric: sleeping   Neurologic: s/p whipple tonight, will arouse but will not answer questions.  Cranial Nerves grossly intact to confrontation  Skin: No rashes                  *Results Reviewed:  I have personally reviewed current lab, radiology, and data and agree.    Results from last 7 days   Lab Units  12/31/18   1344   WBC 10*3/mm3  7.81   HEMOGLOBIN g/dL  10.3*   HEMATOCRIT %  31.1*   PLATELETS 10*3/mm3  187     Results from last 7 days   Lab Units  12/31/18   1343   SODIUM mmol/L  130*   POTASSIUM mmol/L  5.3   CHLORIDE mmol/L  102   CO2 mmol/L  21.0   BUN mg/dL  23   CREATININE mg/dL  1.20   GLUCOSE mg/dL  239*   CALCIUM mg/dL  8.4*   ALT (SGPT) U/L  28   AST (SGOT) U/L  47*     Estimated Creatinine Clearance: 74.1 mL/min (by C-G formula based on SCr of 1.2 mg/dL).  Brief Urine Lab Results  (Last result in the past 365 days)      Color   Clarity   Blood   Leuk Est   Nitrite   Protein   CREAT   Urine HCG        12/06/18 2342 Yellow Clear Negative Negative Negative Negative             No results found for: BNP    Microbiology Results Abnormal     None          Imaging Results (last 24 hours)     ** No results found for the last 24 hours. **        Results for orders placed during the hospital encounter of 10/22/17   Adult Transthoracic Echo Complete W/ Cont if  Necessary Per Protocol    Narrative · Left ventricular systolic function is normal. Estimated EF = 60%.  · The cardiac valves are anatomically and functionally normal.          Assessment/Plan   Assessment / Plan     Active Hospital Problems    Diagnosis Date Noted   • Laboratory test [Z01.89] 12/31/2018   • Type 2 diabetes mellitus (CMS/HCC) [E11.9] 12/31/2018         Type 2 diabetes mellitus  -continue SSI  -check hgb a1c  -if blood glucose continues to elevate, consider adding long acting insulin   -finger sticks achs     Pancreatic cancer  -s/p whipple,  gastrojejunostomy tube placement, wedge biopsy of liver tumor, portal vein resection and lateral repair.   -continue treatment plan per Dr. Brody       Thank you for allowing [facility] Medicine Service to provide consultative care for your patient, we will continue to follow while clinically appropriate.    Electronically signed by VIMAL Fuentes, 12/31/18, 11:39 PM.

## 2019-01-01 NOTE — PROGRESS NOTES
Adult Nutrition  Assessment/PES    Patient Name:  Todd Phillips  YOB: 1950  MRN: [id number]  Admit Date:  12/31/2018    Assessment Date:  1/1/2019      Reason for Assessment     Row Name 01/01/19 1522          Reason for Assessment    Reason For Assessment  -- nsg screen/adm database. 25 mins     Diagnosis  -- pancreatic cancer, s/p Whipple procedure 12/31, wedge bx of liver tumor, portal vein resection/repair and G/J tube placed.            Anthropometrics     Row Name 01/01/19 1523          Anthropometrics    Weight  -- ht=72in, tz=720ze (per EPIC; method unknown); BMI=27        Usual Body Weight (UBW)    Weight Gain  intentional   wife reports pt has gained 6lb during the past month, while he was receiving PN.  Wife gives wt hx that pt lost 40lb unintentionally from Aug-Nov 2018 and now pt has regained 6lb of that wt lost (during Dec 2018). Pt not awake during RD's visit.                Nutrition Prescription Ordered     Row Name 01/01/19 1526          Nutrition Prescription PO    Current PO Diet  NPO           Intervention Goal     Row Name 01/01/19 1527          Intervention Goal    PO  Initiate feeding           Nutrition Prescription     Row Name 01/01/19 1527          Nutrition Prescription EN    Enteral Prescription  -- Consider initiating supplemental enteral feeds when feasible. Will cont to follow         Education/Evaluation     Row Name 01/01/19 1527          Monitor/Evaluation    Monitor  Per protocol           Electronically signed by:  Hannah Alonso MS,RD,LD  01/01/19 3:28 PM

## 2019-01-01 NOTE — PROGRESS NOTES
"Patient Name:  Todd Phillips  YOB: 1950  [id number]    Surgery Progress Note    Date of visit: 1/1/2019    Subjective   Subjective:   POD1 s/p Whipple for pancreatic mass - uncinate process malignancy.   Stable though somewhat restless overnight.  Pain controlled when using PCA, though using infrequently.  Sleepy and still somewhat slow to respond this AM but denies any major complaints.          Objective     Objective:     /88 (BP Location: Left arm, Patient Position: Lying)   Pulse 112   Temp 98.7 °F (37.1 °C) (Axillary)   Resp 16   Ht 182.9 cm (72\")   Wt 91.5 kg (201 lb 12.8 oz)   SpO2 96%   BMI 27.37 kg/m²     Intake/Output Summary (Last 24 hours) at 1/1/2019 0810  Last data filed at 1/1/2019 0600  Gross per 24 hour   Intake 2100 ml   Output 1675 ml   Net 425 ml       Gen:  Sleepy but calm and in no distress   CV:  Rhythm regular and rate slightly tachycardic to low 100s   L:  Clear to auscultation bilaterally  Abd:  Appropriately tender, soft, non distended, GJ tube in place draining bilious fluid to bill bag, MADDY serosanguinous, dressings clean and dry   Ext:  No cyanosis, clubbing, edema  Neuro:  Sleepy and somewhat slow to respond but free of focal deficits and GCS 15       Labs that are back at this time have been reviewed.  Results for orders placed or performed during the hospital encounter of 12/31/18   OR Potassium   Result Value Ref Range    Potassium, OR 3.95 3.5 - 5.3 mmol/L   OR Surgery Panel   Result Value Ref Range    pH, Arterial 7.458 7.34 - 7.46 pH units    pCO2, Arterial 29.1 (L) 34 - 46 mm Hg    pO2, Arterial 386.0 (H) 79 - 99 mmHg    HCO3, Arterial 20.1 20 - 26 mmol/L    Base Excess, Arterial -2.7 (L) 0.0 - 2.0 mmol/L    Total CO2 21.0 (L) 23 - 27 mmol/L    Hematocrit, Blood Gas 35 (L) 39 - 51 %    Hemoglobin 11.9 10 - 16 g/dL    O2 Saturation, Arterial 99.7 (H) 92 - 98 %    Sodium, Arterial 128.8 (L) 134 - 146 mmol/L    Potassium, OR 4.28 3.5 - 5.3 mmol/L    " Ionized Calcium, Arterial 1.10 (L) 1.12 - 1.3 mmol/L    Chloride, Arterial 102 98 - 105 mmol/L    Glucose, Arterial 170 (H) 67 - 93 mg/dL   OR Surgery Panel   Result Value Ref Range    pH, Arterial 7.397 7.34 - 7.46 pH units    pCO2, Arterial 34.1 34 - 46 mm Hg    pO2, Arterial 302.6 (H) 79 - 99 mmHg    HCO3, Arterial 20.5 20 - 26 mmol/L    Base Excess, Arterial -3.7 (L) 0.0 - 2.0 mmol/L    Total CO2 21.6 (L) 23 - 27 mmol/L    Hematocrit, Blood Gas 32 (L) 39 - 51 %    Hemoglobin 10.8 10 - 16 g/dL    O2 Saturation, Arterial 99.8 (H) 92 - 98 %    Sodium, Arterial 130.0 (L) 134 - 146 mmol/L    Potassium, OR 5.46 (H) 3.5 - 5.3 mmol/L    Ionized Calcium, Arterial 1.09 (L) 1.12 - 1.3 mmol/L    Chloride, Arterial 103 98 - 105 mmol/L    Glucose, Arterial 175 (H) 67 - 93 mg/dL   CBC (No Diff)   Result Value Ref Range    WBC 7.81 3.50 - 10.80 10*3/mm3    RBC 3.41 (L) 4.20 - 5.76 10*6/mm3    Hemoglobin 10.3 (L) 13.1 - 17.5 g/dL    Hematocrit 31.1 (L) 38.9 - 50.9 %    MCV 91.2 80.0 - 99.0 fL    MCH 30.2 27.0 - 31.0 pg    MCHC 33.1 32.0 - 36.0 g/dL    RDW 16.3 (H) 11.3 - 14.5 %    RDW-SD 54.0 37.0 - 54.0 fl    MPV 8.9 6.0 - 12.0 fL    Platelets 187 150 - 450 10*3/mm3   Magnesium   Result Value Ref Range    Magnesium 1.7 1.3 - 2.7 mg/dL   Phosphorus   Result Value Ref Range    Phosphorus 3.7 2.4 - 5.1 mg/dL   Comprehensive Metabolic Panel   Result Value Ref Range    Glucose 239 (H) 70 - 100 mg/dL    BUN 23 9 - 23 mg/dL    Creatinine 1.20 0.60 - 1.30 mg/dL    Sodium 130 (L) 132 - 146 mmol/L    Potassium 5.3 3.5 - 5.5 mmol/L    Chloride 102 99 - 109 mmol/L    CO2 21.0 20.0 - 31.0 mmol/L    Calcium 8.4 (L) 8.7 - 10.4 mg/dL    Total Protein 5.8 5.7 - 8.2 g/dL    Albumin 3.60 3.20 - 4.80 g/dL    ALT (SGPT) 28 7 - 40 U/L    AST (SGOT) 47 (H) 0 - 33 U/L    Alkaline Phosphatase 78 25 - 100 U/L    Total Bilirubin 0.7 0.3 - 1.2 mg/dL    eGFR Non African Amer 60 (L) >60 mL/min/1.73    Globulin 2.2 gm/dL    A/G Ratio 1.6 1.5 - 2.5 g/dL     BUN/Creatinine Ratio 19.2 7.0 - 25.0    Anion Gap 7.0 3.0 - 11.0 mmol/L   Blood Gas, Arterial With Co-Ox   Result Value Ref Range    Site Arterial Line     Dameon's Test N/A     pH, Arterial 7.421 7.350 - 7.450 pH units    pCO2, Arterial 32.8 mm Hg    pO2, Arterial 75.0 (L) 83.0 - 108.0 mm Hg    HCO3, Arterial 21.3 20.0 - 26.0 mmol/L    Base Excess, Arterial -2.6 (L) 0.0 - 2.0 mmol/L    Hemoglobin, Blood Gas 10.3 (L) 13.5 - 17.5 g/dL    Hematocrit, Blood Gas 31.6 %    Oxyhemoglobin 93.4 (L) 94 - 99 %    Methemoglobin 0.80 0.00 - 1.50 %    Carboxyhemoglobin 1.4 0 - 2 %    CO2 Content 22.3 (L) 23 - 27 mmol/L    Temperature 37.0 C    Barometric Pressure for Blood Gas  mmHg    Modality Nasal Cannula     FIO2 32 %    Ventilator Mode        Note      pH, Temp Corrected 7.421 pH Units    pCO2, Temperature Corrected 32.8 (L) 35 - 48 mm Hg    pO2, Temperature Corrected 75.0 (L) 83 - 108 mm Hg   Blood Gas, Arterial With Co-Ox   Result Value Ref Range    Site Right Radial     Dameon's Test N/A     pH, Arterial 7.448 7.350 - 7.450 pH units    pCO2, Arterial 33.4 mm Hg    pO2, Arterial 62.8 (L) 83.0 - 108.0 mm Hg    HCO3, Arterial 23.1 20.0 - 26.0 mmol/L    Base Excess, Arterial -0.6 (L) 0.0 - 2.0 mmol/L    Hemoglobin, Blood Gas 10.1 (L) 13.5 - 17.5 g/dL    Hematocrit, Blood Gas 31.0 %    Oxyhemoglobin 94.3 94 - 99 %    Methemoglobin  0.00 - 1.50 %    Carboxyhemoglobin 1.6 0 - 2 %    CO2 Content 24.1 23 - 27 mmol/L    Temperature 37.0 C    Barometric Pressure for Blood Gas  mmHg    Modality Room Air     FIO2 21 %    Ventilator Mode NA     Note      pH, Temp Corrected 7.448 pH Units    pCO2, Temperature Corrected 33.4 (L) 35 - 48 mm Hg    pO2, Temperature Corrected  83 - 108 mm Hg   Basic Metabolic Panel   Result Value Ref Range    Glucose 199 (H) 70 - 100 mg/dL    BUN 22 9 - 23 mg/dL    Creatinine 1.03 0.60 - 1.30 mg/dL    Sodium 133 132 - 146 mmol/L    Potassium 4.2 3.5 - 5.5 mmol/L    Chloride 101 99 - 109 mmol/L    CO2 22.0  20.0 - 31.0 mmol/L    Calcium 8.3 (L) 8.7 - 10.4 mg/dL    eGFR Non African Amer 71 >60 mL/min/1.73    BUN/Creatinine Ratio 21.4 7.0 - 25.0    Anion Gap 10.0 3.0 - 11.0 mmol/L   Protime-INR   Result Value Ref Range    Protime 11.4 9.6 - 11.5 Seconds    INR 1.09 0.91 - 1.09   Magnesium   Result Value Ref Range    Magnesium 1.6 1.3 - 2.7 mg/dL   Hemoglobin A1c   Result Value Ref Range    Hemoglobin A1C 6.10 (H) 4.80 - 5.60 %   CBC Auto Differential   Result Value Ref Range    WBC 12.54 (H) 3.50 - 10.80 10*3/mm3    RBC 3.33 (L) 4.20 - 5.76 10*6/mm3    Hemoglobin 9.9 (L) 13.1 - 17.5 g/dL    Hematocrit 30.1 (L) 38.9 - 50.9 %    MCV 90.4 80.0 - 99.0 fL    MCH 29.7 27.0 - 31.0 pg    MCHC 32.9 32.0 - 36.0 g/dL    RDW 16.5 (H) 11.3 - 14.5 %    RDW-SD 54.1 (H) 37.0 - 54.0 fl    MPV 9.6 6.0 - 12.0 fL    Platelets 232 150 - 450 10*3/mm3    Neutrophil % 86.9 (H) 41.0 - 71.0 %    Lymphocyte % 6.3 (L) 24.0 - 44.0 %    Monocyte % 6.8 0.0 - 12.0 %    Eosinophil % 0.0 0.0 - 3.0 %    Basophil % 0.0 0.0 - 1.0 %    Immature Grans % 0.2 0.0 - 0.6 %    Neutrophils, Absolute 10.90 (H) 1.50 - 8.30 10*3/mm3    Lymphocytes, Absolute 0.79 0.60 - 4.80 10*3/mm3    Monocytes, Absolute 0.85 0.00 - 1.00 10*3/mm3    Eosinophils, Absolute 0.00 0.00 - 0.30 10*3/mm3    Basophils, Absolute 0.00 0.00 - 0.20 10*3/mm3    Immature Grans, Absolute 0.03 0.00 - 0.03 10*3/mm3   POC Glucose Once   Result Value Ref Range    Glucose 165 (H) 70 - 130 mg/dL   POC Glucose Once   Result Value Ref Range    Glucose 247 (H) 70 - 130 mg/dL   POC Glucose Once   Result Value Ref Range    Glucose 162 (H) 70 - 130 mg/dL   POC Glucose Once   Result Value Ref Range    Glucose 162 (H) 70 - 130 mg/dL   POC Glucose Once   Result Value Ref Range    Glucose 209 (H) 70 - 130 mg/dL   POC Glucose Once   Result Value Ref Range    Glucose 201 (H) 70 - 130 mg/dL   ABORH 2ND SPECIMEN VERIFICATION   Result Value Ref Range    ABO Type O     RH type Positive    Type & Screen   Result Value Ref  Range    ABO Type O     RH type Positive     Antibody Screen Negative     T&S Expiration Date 1/3/2019 11:59:59 PM    Tissue Pathology Exam   Result Value Ref Range    Case Report       Surgical Pathology Report                         Case: [id number]                                  Authorizing Provider:  Miquel Brody,   Collected:           12/31/2018 08:36 AM                                 MD                                                                           Ordering Location:     [facility]   Received:            12/31/2018 08:45 AM                                 OR                                                                           Pathologist:           Chepe Pratt MD                                                        Intraop:               Chepe Pratt MD                                                        Specimens:   1) - Liver, WEDGE BX                                                                                2) - Omentum, OMENTUM                                                                               3) - Lymph Node, HEPATIC ARTERY LYMPH NODE                                                           4) - Common Bile Duct, BILE DUCT MARGIN                                                             5) - Lymph Node, aorto-caval LYMPH NODE                                                             6) - Pancreas, PANCREATDUODENECTOMY , LONG STITCH MARKS PANCREATIC DUCT MARGIN,                     DOUBLE STITCH SMA MARGIN, WANTS FROZEN ON PANCREATRIC DUCT MARGIN                          Intraoperative Consultation       Frozen section: Verbal report given to Dr. Brody via speakerphone on 12/31/2018 at 8:51 AM.    FROZEN SECTION DIAGNOSIS: Benign appearing bile ductular proliferation (PCC/RLL)        Frozen section: Verbal report given to Dr. Dr. Brody via speakerphone on 12/31/2018 at 9:44 AM.    FROZEN SECTION DIAGNOSIS:   Benign  inflamed duct margin (RLL/PCC)        Frozen section: Verbal report given to Dr. Gore via speakerphone on 12/31/2018 at 11:25 AM.    FROZEN SECTION DIAGNOSIS: Benign pancreatic duct margin  (RLL/PCC)                    Assessment/Plan     Assessment/ Plan:    67 yo man with pancreatic malignancy of uncinate process, POD 1 s/p Whipple     - overall stable and progressing, pain controlled when using PCA, nausea controlled with Zofran  - continue pain and nausea control as needed   - incentive spirometry and out of bed today   - will advance diet as tolerated vs tube feeds via GJ pending progress and bowel function/nausea control       Hospital Problem List     Laboratory test    Type 2 diabetes mellitus (CMS/HCC)                  Nisha Ackerman MD  1/1/2019  8:10 AM

## 2019-01-01 NOTE — PLAN OF CARE
Problem: Patient Care Overview  Goal: Plan of Care Review  Outcome: Ongoing (interventions implemented as appropriate)   01/01/19 1420   OTHER   Outcome Summary PT eval completed. Pt presents obtunded, confused, with decreased functional mobility independence s/p surgery. Pt olga PROM to BLE's and BUE's in all planes. OOB mobility deferred due to safety concerns. PT to follow 3x/wk until able to participate more, recommend d/c to IRF pending progress.   Coping/Psychosocial   Plan of Care Reviewed With patient

## 2019-01-02 ENCOUNTER — APPOINTMENT (OUTPATIENT)
Dept: ONCOLOGY | Facility: HOSPITAL | Age: 69
End: 2019-01-02
Attending: INTERNAL MEDICINE

## 2019-01-02 PROBLEM — R41.0 CONFUSION: Status: ACTIVE | Noted: 2019-01-02

## 2019-01-02 LAB
ANION GAP SERPL CALCULATED.3IONS-SCNC: 9 MMOL/L (ref 3–11)
BUN BLD-MCNC: 22 MG/DL (ref 9–23)
BUN/CREAT SERPL: 23.9 (ref 7–25)
CALCIUM SPEC-SCNC: 7.8 MG/DL (ref 8.7–10.4)
CHLORIDE SERPL-SCNC: 106 MMOL/L (ref 99–109)
CO2 SERPL-SCNC: 23 MMOL/L (ref 20–31)
CREAT BLD-MCNC: 0.92 MG/DL (ref 0.6–1.3)
DEPRECATED RDW RBC AUTO: 53.7 FL (ref 37–54)
ERYTHROCYTE [DISTWIDTH] IN BLOOD BY AUTOMATED COUNT: 16.2 % (ref 11.3–14.5)
GFR SERPL CREATININE-BSD FRML MDRD: 81 ML/MIN/1.73
GLUCOSE BLD-MCNC: 195 MG/DL (ref 70–100)
GLUCOSE BLDC GLUCOMTR-MCNC: 155 MG/DL (ref 70–130)
GLUCOSE BLDC GLUCOMTR-MCNC: 196 MG/DL (ref 70–130)
GLUCOSE BLDC GLUCOMTR-MCNC: 216 MG/DL (ref 70–130)
GLUCOSE BLDC GLUCOMTR-MCNC: 230 MG/DL (ref 70–130)
HCT VFR BLD AUTO: 24.8 % (ref 38.9–50.9)
HGB BLD-MCNC: 8.2 G/DL (ref 13.1–17.5)
MAGNESIUM SERPL-MCNC: 1.9 MG/DL (ref 1.3–2.7)
MCH RBC QN AUTO: 30 PG (ref 27–31)
MCHC RBC AUTO-ENTMCNC: 33.1 G/DL (ref 32–36)
MCV RBC AUTO: 90.8 FL (ref 80–99)
PLATELET # BLD AUTO: 202 10*3/MM3 (ref 150–450)
PMV BLD AUTO: 10.2 FL (ref 6–12)
POTASSIUM BLD-SCNC: 3.5 MMOL/L (ref 3.5–5.5)
RBC # BLD AUTO: 2.73 10*6/MM3 (ref 4.2–5.76)
SODIUM BLD-SCNC: 138 MMOL/L (ref 132–146)
WBC NRBC COR # BLD: 9.27 10*3/MM3 (ref 3.5–10.8)

## 2019-01-02 PROCEDURE — 25010000002 PIPERACILLIN SOD-TAZOBACTAM PER 1 G: Performed by: SURGERY

## 2019-01-02 PROCEDURE — 99233 SBSQ HOSP IP/OBS HIGH 50: CPT | Performed by: HOSPITALIST

## 2019-01-02 PROCEDURE — 25010000002 MORPHINE PER 10 MG: Performed by: SURGERY

## 2019-01-02 PROCEDURE — 94799 UNLISTED PULMONARY SVC/PX: CPT

## 2019-01-02 PROCEDURE — 82962 GLUCOSE BLOOD TEST: CPT

## 2019-01-02 PROCEDURE — 94760 N-INVAS EAR/PLS OXIMETRY 1: CPT

## 2019-01-02 PROCEDURE — 25010000002 ENOXAPARIN PER 10 MG: Performed by: SURGERY

## 2019-01-02 PROCEDURE — 83735 ASSAY OF MAGNESIUM: CPT | Performed by: SURGERY

## 2019-01-02 PROCEDURE — 94640 AIRWAY INHALATION TREATMENT: CPT

## 2019-01-02 PROCEDURE — 25010000002 PROMETHAZINE PER 50 MG: Performed by: SURGERY

## 2019-01-02 PROCEDURE — 80048 BASIC METABOLIC PNL TOTAL CA: CPT | Performed by: SURGERY

## 2019-01-02 PROCEDURE — 25010000002 HYDROMORPHONE 1 MG/ML SOLUTION: Performed by: SURGERY

## 2019-01-02 PROCEDURE — 85027 COMPLETE CBC AUTOMATED: CPT | Performed by: SURGERY

## 2019-01-02 RX ORDER — OXYCODONE HCL 5 MG/5 ML
7.5 SOLUTION, ORAL ORAL EVERY 4 HOURS PRN
Status: DISCONTINUED | OUTPATIENT
Start: 2019-01-02 | End: 2019-01-05

## 2019-01-02 RX ORDER — AMINO ACIDS/PROTEIN HYDROLYS 15G-100/30
1 LIQUID (ML) ORAL 2 TIMES DAILY
Status: DISCONTINUED | OUTPATIENT
Start: 2019-01-02 | End: 2019-01-05

## 2019-01-02 RX ORDER — PROCHLORPERAZINE MALEATE 5 MG/1
5 TABLET ORAL EVERY 6 HOURS PRN
Status: DISCONTINUED | OUTPATIENT
Start: 2019-01-02 | End: 2019-01-05

## 2019-01-02 RX ORDER — OXYCODONE HCL 5 MG/5 ML
10 SOLUTION, ORAL ORAL EVERY 4 HOURS PRN
Status: DISCONTINUED | OUTPATIENT
Start: 2019-01-02 | End: 2019-01-05

## 2019-01-02 RX ORDER — PROCHLORPERAZINE 25 MG
25 SUPPOSITORY, RECTAL RECTAL EVERY 12 HOURS PRN
Status: DISCONTINUED | OUTPATIENT
Start: 2019-01-02 | End: 2019-01-05

## 2019-01-02 RX ADMIN — DOCUSATE SODIUM AND SENNOSIDES 2 TABLET: 8.6; 5 TABLET, FILM COATED ORAL at 21:39

## 2019-01-02 RX ADMIN — PREGABALIN 75 MG: 75 CAPSULE ORAL at 08:45

## 2019-01-02 RX ADMIN — INSULIN LISPRO 2 UNITS: 100 INJECTION, SOLUTION INTRAVENOUS; SUBCUTANEOUS at 11:23

## 2019-01-02 RX ADMIN — TAZOBACTAM SODIUM AND PIPERACILLIN SODIUM 3.38 G: 375; 3 INJECTION, SOLUTION INTRAVENOUS at 16:06

## 2019-01-02 RX ADMIN — INSULIN LISPRO 4 UNITS: 100 INJECTION, SOLUTION INTRAVENOUS; SUBCUTANEOUS at 22:45

## 2019-01-02 RX ADMIN — TAZOBACTAM SODIUM AND PIPERACILLIN SODIUM 3.38 G: 375; 3 INJECTION, SOLUTION INTRAVENOUS at 09:06

## 2019-01-02 RX ADMIN — ACETAMINOPHEN 1000 MG: 650 SOLUTION ORAL at 11:22

## 2019-01-02 RX ADMIN — INSULIN LISPRO 4 UNITS: 100 INJECTION, SOLUTION INTRAVENOUS; SUBCUTANEOUS at 09:06

## 2019-01-02 RX ADMIN — TAZOBACTAM SODIUM AND PIPERACILLIN SODIUM 3.38 G: 375; 3 INJECTION, SOLUTION INTRAVENOUS at 00:18

## 2019-01-02 RX ADMIN — ALLOPURINOL 100 MG: 100 TABLET ORAL at 21:39

## 2019-01-02 RX ADMIN — MORPHINE SULFATE 2 MG: 2 INJECTION, SOLUTION INTRAMUSCULAR; INTRAVENOUS at 06:44

## 2019-01-02 RX ADMIN — MORPHINE SULFATE 2 MG: 2 INJECTION, SOLUTION INTRAMUSCULAR; INTRAVENOUS at 02:17

## 2019-01-02 RX ADMIN — FLECAINIDE ACETATE 50 MG: 50 TABLET ORAL at 21:39

## 2019-01-02 RX ADMIN — FLUOXETINE HYDROCHLORIDE 20 MG: 20 CAPSULE ORAL at 08:45

## 2019-01-02 RX ADMIN — HYDROMORPHONE HYDROCHLORIDE 1 MG: 1 INJECTION, SOLUTION INTRAMUSCULAR; INTRAVENOUS; SUBCUTANEOUS at 21:40

## 2019-01-02 RX ADMIN — ACETAMINOPHEN 1000 MG: 650 SOLUTION ORAL at 06:44

## 2019-01-02 RX ADMIN — BUDESONIDE 0.5 MG: 0.5 INHALANT RESPIRATORY (INHALATION) at 20:37

## 2019-01-02 RX ADMIN — INSULIN LISPRO 2 UNITS: 100 INJECTION, SOLUTION INTRAVENOUS; SUBCUTANEOUS at 17:34

## 2019-01-02 RX ADMIN — HYDROMORPHONE HYDROCHLORIDE 1 MG: 1 INJECTION, SOLUTION INTRAMUSCULAR; INTRAVENOUS; SUBCUTANEOUS at 08:49

## 2019-01-02 RX ADMIN — BUDESONIDE 0.5 MG: 0.5 INHALANT RESPIRATORY (INHALATION) at 08:54

## 2019-01-02 RX ADMIN — COLCHICINE 0.6 MG: 0.6 TABLET, FILM COATED ORAL at 08:45

## 2019-01-02 RX ADMIN — PROMETHAZINE HYDROCHLORIDE 6.25 MG: 25 INJECTION INTRAMUSCULAR; INTRAVENOUS at 08:42

## 2019-01-02 RX ADMIN — MONTELUKAST SODIUM 10 MG: 10 TABLET, FILM COATED ORAL at 21:39

## 2019-01-02 RX ADMIN — FLUOXETINE HYDROCHLORIDE 20 MG: 20 CAPSULE ORAL at 21:39

## 2019-01-02 RX ADMIN — HYDROMORPHONE HYDROCHLORIDE 1 MG: 1 INJECTION, SOLUTION INTRAMUSCULAR; INTRAVENOUS; SUBCUTANEOUS at 13:46

## 2019-01-02 RX ADMIN — HYDROMORPHONE HYDROCHLORIDE 1 MG: 1 INJECTION, SOLUTION INTRAMUSCULAR; INTRAVENOUS; SUBCUTANEOUS at 16:06

## 2019-01-02 RX ADMIN — ENOXAPARIN SODIUM 40 MG: 40 INJECTION, SOLUTION INTRAVENOUS; SUBCUTANEOUS at 11:22

## 2019-01-02 RX ADMIN — Medication 1 PACKET: at 23:24

## 2019-01-02 RX ADMIN — FLECAINIDE ACETATE 50 MG: 50 TABLET ORAL at 08:46

## 2019-01-02 RX ADMIN — HYDROMORPHONE HYDROCHLORIDE 1 MG: 1 INJECTION, SOLUTION INTRAMUSCULAR; INTRAVENOUS; SUBCUTANEOUS at 19:32

## 2019-01-02 NOTE — PROGRESS NOTES
Discharge Planning Assessment  [facility]     Patient Name: Todd Phillips  MRN: [id number]  Today's Date: 1/2/2019    Admit Date: 12/31/2018    Discharge Needs Assessment     Row Name 01/02/19 1353       Living Environment    Lives With  spouse    Name(s) of Who Lives With Patient  spouse is Emmy    Current Living Arrangements  home/apartment/condo One level home with basement    Primary Care Provided by  spouse/significant other    Provides Primary Care For  no one, unable/limited ability to care for self    Family Caregiver if Needed  spouse;child(cuauhtemoc), adult    Quality of Family Relationships  supportive;involved;helpful    Able to Return to Prior Arrangements  yes       Resource/Environmental Concerns    Resource/Environmental Concerns  none    Transportation Concerns  car, none       Transition Planning    Patient/Family Anticipates Transition to  inpatient rehabilitation facility;home with family    Patient/Family Anticipated Services at Transition  home health care;rehabilitation services    Transportation Anticipated  family or friend will provide       Discharge Needs Assessment    Readmission Within the Last 30 Days  no previous admission in last 30 days    Concerns to be Addressed  discharge planning    Equipment Currently Used at Home  shower chair;walker, rolling;wheelchair;bipap/cpap Infusion supplies/TPN provided by [facility] out of [place], He uses CPAP    Anticipated Changes Related to Illness  inability to care for self;other (see comments) Post surgical recuperation    Equipment Needed After Discharge  none    Discharge Facility/Level of Care Needs  skilled transitional care;rehabilitation facility;home with home health    Current Discharge Risk  dependent with mobility/activities of daily living;chronically ill        Discharge Plan     Row Name 01/02/19 6070       Plan    Plan  Home with wife vs placement for rehab    Patient/Family in Agreement with Plan  yes    Plan Comments  I spoke with  patient's wife Emmy. She has been providing most caregiving for her  at home. They do have children nearby. She tells me they are current with [facility] for TPN management.     They prefer home with home health but are not opposed to rehab placement if needed. I discussed facilities with patient's wife and what was near to them. Will give a referral to [facility] in [facility]. I have given Faustina Donaldson the referral. He most likely would need to be off TPN. Can look into Acute rehab at [facility] but [facility] is local.      will continue to follow and help with discharge planning.         Destination      No service coordination in this encounter.      Durable Medical Equipment      No service coordination in this encounter.      Dialysis/Infusion      No service coordination in this encounter.      Home Medical Care      No service coordination in this encounter.      Community Resources      No service coordination in this encounter.        Expected Discharge Date and Time     Expected Discharge Date Expected Discharge Time    Jan 8, 2019         Demographic Summary     Row Name 01/02/19 1351       General Information    Admission Type  inpatient    Referral Source  admission list    Preferred Language  English    General Information Comments  PCP is Adiel Denney       Contact Information    Permission Granted to Share Info With      Contact Information Obtained for      Contact Information Comments  [phone]        Functional Status     Row Name 01/02/19 1352       Functional Status    Usual Activity Tolerance  fair    Current Activity Tolerance  poor       Functional Status, IADL    Medications  assistive person    Meal Preparation  assistive person    Housekeeping  assistive person    Laundry  assistive person    Shopping  assistive person    IADL Comments  His wife helps him shower and she does IADL        Employment/    Employment/ Comments  Has Medicare and [employer]. No concerns with his coverage or medication coverage        Psychosocial    No documentation.       Abuse/Neglect    No documentation.       Legal    No documentation.       Substance Abuse    No documentation.       Patient Forms    No documentation.           Anna Pena RN

## 2019-01-02 NOTE — PROGRESS NOTES
[facility] Medicine Services  PROGRESS NOTE    Patient Name: Todd Phillips  : 1950  MRN: [id number]    Date of Admission: 2018  Length of Stay: 2  Primary Care Physician: Adiel Denney MD    Subjective   Subjective     CC:  F/U pancreatic cancer s/p Whipple    HPI:  Patient seen this morning. He seems to be doing better per wife at bedside. Still somnolent at times and confused. PCA was stopped.     Review of Systems  Limited due to mental status  Denies pain or nausea currently    Otherwise ROS is negative except as mentioned in the HPI.    Objective   Objective     Vital Signs:   Temp:  [98.3 °F (36.8 °C)-99.4 °F (37.4 °C)] 98.3 °F (36.8 °C)  Heart Rate:  [] 97  Resp:  [16-18] 18  BP: (130-160)/(71-99) 149/85        Physical Exam:  Gen-no acute distress  HENT-NCAT, mucous membranes moist  CV-RRR, S1 S2 normal, no m/r/g  Resp-CTAB, no wheezes or rales  Abd-soft, mildly TTP, ND, +BS  Ext-no edema  Neuro-somnolent but more arouseable today, no focal deficits  Skin-no rashes  Psych-appropriate mood      Results Reviewed:  I have personally reviewed current lab, radiology, and data and agree.    Results from last 7 days   Lab Units  19   0609  19   0542  18   1344   WBC 10*3/mm3  9.27  12.54*  7.81   HEMOGLOBIN g/dL  8.2*  9.9*  10.3*   HEMATOCRIT %  24.8*  30.1*  31.1*   PLATELETS 10*3/mm3  202  232  187   INR    --   1.09   --      Results from last 7 days   Lab Units  19   0609  19   0542  18   1343   SODIUM mmol/L  138  133  130*   POTASSIUM mmol/L  3.5  4.2  5.3   CHLORIDE mmol/L  106  101  102   CO2 mmol/L  23.0  22.0  21.0   BUN mg/dL  22  22  23   CREATININE mg/dL  0.92  1.03  1.20   GLUCOSE mg/dL  195*  199*  239*   CALCIUM mg/dL  7.8*  8.3*  8.4*   ALT (SGPT) U/L   --    --   28   AST (SGOT) U/L   --    --   47*     Estimated Creatinine Clearance: 96.7 mL/min (by C-G formula based on SCr of 0.92 mg/dL).    No results  found for: BNP    Microbiology Results Abnormal     None          Imaging Results (last 24 hours)     ** No results found for the last 24 hours. **          Results for orders placed during the hospital encounter of 10/22/17   Adult Transthoracic Echo Complete W/ Cont if Necessary Per Protocol    Narrative · Left ventricular systolic function is normal. Estimated EF = 60%.  · The cardiac valves are anatomically and functionally normal.          I have reviewed the medications:    Current Facility-Administered Medications:   •  acetaminophen (TYLENOL) 160 MG/5ML solution 1,000 mg, 1,000 mg, Oral, Q6H, Miquel Brody MD, 1,000 mg at 01/02/19 1122  •  albuterol (PROVENTIL) nebulizer solution 0.083% 2.5 mg/3mL, 2.5 mg, Nebulization, Q4H PRN, Miquel Brody MD  •  albuterol (PROVENTIL) nebulizer solution 0.083% 2.5 mg/3mL, 2.5 mg, Nebulization, BID PRN, Miquel Brody MD  •  allopurinol (ZYLOPRIM) tablet 100 mg, 100 mg, Oral, Nightly, Miquel Brody MD  •  budesonide (PULMICORT) nebulizer solution 0.5 mg, 0.5 mg, Nebulization, BID - RT, Miquel Brody MD, 0.5 mg at 01/02/19 0854  •  colchicine tablet 0.6 mg, 0.6 mg, Oral, Daily, Miquel Brody MD, 0.6 mg at 01/02/19 0845  •  enoxaparin (LOVENOX) syringe 40 mg, 40 mg, Subcutaneous, Q24H, Miquel Brody MD, 40 mg at 01/02/19 1122  •  flecainide (TAMBOCOR) tablet 50 mg, 50 mg, Oral, Q12H, Miquel Brody MD, 50 mg at 01/02/19 0846  •  FLUoxetine (PROzac) capsule 20 mg, 20 mg, Oral, BID, Miquel Brody MD, 20 mg at 01/02/19 0845  •  HYDROmorphone (DILAUDID) injection 1 mg, 1 mg, Intravenous, Q2H PRN, Miquel Brody MD, 1 mg at 01/02/19 0849  •  insulin lispro (humaLOG) injection 0-9 Units, 0-9 Units, Subcutaneous, 4x Daily With Meals & Nightly, Miquel Brody MD, 2 Units at 01/02/19 1123  •  Magnesium Sulfate 2 gram Bolus, followed by 8 gram infusion (total Mg dose 10  grams)- Mg less than or equal to 1mg/dL, 2 g, Intravenous, PRN **OR** Magnesium Sulfate 2 gram / 50mL Infusion (GIVE X 3 BAGS TO EQUAL 6GM TOTAL DOSE) - Mg 1.1 - 1.5 mg/dl, 2 g, Intravenous, PRN **OR** Magnesium Sulfate 4 gram infusion- Mg 1.6-1.9 mg/dL, 4 g, Intravenous, PRN, Andreina Beltran MD, Last Rate: 25 mL/hr at 01/01/19 1651, 4 g at 01/01/19 1651  •  montelukast (SINGULAIR) tablet 10 mg, 10 mg, Oral, Nightly, Miquel Brody MD  •  morphine injection 2 mg, 2 mg, Intravenous, Q4H PRN, Miquel Brody MD, 2 mg at 01/02/19 0644  •  naloxone (NARCAN) injection 0.1 mg, 0.1 mg, Intravenous, Q5 Min PRN, Miquel Brody MD  •  nitroglycerin (NITROSTAT) SL tablet 0.4 mg, 0.4 mg, Sublingual, PRN, Miquel Brody MD  •  ondansetron (ZOFRAN) injection 4 mg, 4 mg, Intravenous, Q6H PRN, Miquel Brody MD, 4 mg at 01/01/19 0940  •  oxyCODONE (ROXICODONE) 5 MG/5ML solution 10 mg, 10 mg, Per G Tube, Q4H PRN, Miquel Brody MD  •  oxyCODONE (ROXICODONE) 5 MG/5ML solution 7.5 mg, 7.5 mg, Per G Tube, Q4H PRN, Miquel Brody MD  •  pantoprazole (PROTONIX) EC tablet 40 mg, 40 mg, Oral, QAM, Miquel Brody MD, Stopped at 01/01/19 0801  •  piperacillin-tazobactam (ZOSYN) 3.375 g in iso-osmotic dextrose 50 ml (premix), 3.375 g, Intravenous, Q8H, Miquel Brody MD, 3.375 g at 01/02/19 0906  •  pregabalin (LYRICA) capsule 75 mg, 75 mg, Oral, Daily, Miquel Brody MD, 75 mg at 01/02/19 0845  •  prochlorperazine (COMPAZINE) injection 5 mg, 5 mg, Intravenous, Q6H PRN **OR** prochlorperazine (COMPAZINE) tablet 5 mg, 5 mg, Oral, Q6H PRN **OR** prochlorperazine (COMPAZINE) suppository 25 mg, 25 mg, Rectal, Q12H PRN, Miquel Brody MD  •  Scopolamine (TRANSDERM-SCOP) 1.5 MG/3DAYS patch 1 patch, 1 patch, Transdermal, Q72H, Miquel Brody MD, 1 patch at 01/01/19 1139  •  sennosides-docusate sodium (SENOKOT-S) 8.6-50 MG tablet 2  tablet, 2 tablet, Oral, Nightly, Miquel Brody MD    Facility-Administered Medications Ordered in Other Encounters:   •  sodium chloride 0.9 % infusion  - ADS Override Pull, , , ,   •  sodium chloride 0.9 % infusion  - ADS Override Pull, , , ,       Assessment/Plan   Assessment / Plan     Active Hospital Problems    Diagnosis Date Noted   • **Malignant neoplasm of head of pancreas (CMS/[facility]) [C25.0] 09/05/2018     Added automatically from request for surgery [id number]     • Post-operative confusion and somnolence, likely due to oversedation [R41.0] 01/02/2019   • Chronic anticoagulation, Eliquis [Z79.01] 10/27/2017   • Paroxysmal atrial fibrillation [I48.91] 10/22/2017   • DM2 (diabetes mellitus, type 2) (CMS/[facility]) [E11.9] 10/22/2017   • Gastroesophageal reflux disease without esophagitis [K21.9] 02/09/2017   • COPD (chronic obstructive pulmonary disease) (CMS/[facility]) [J44.9] 06/16/2016   • Hyperlipidemia [E78.5] 06/16/2016   • Essential hypertension [I10] 06/16/2016          Brief Hospital Course to date:  Todd Phillips is a 68 y.o. male with hx of pancreatic cancer, COPD, diabetes mellitus type 2, PAF, GERD, essential hypertension, and hyperlipidemia who was admitted by Dr. Brody 12/31/18 and underwent planned Whipple for locally advanced pancreatic cancer. Hospitalists consulted post-operatively for medical management.    PLAN:  --Post-op confusion and somnolence likely due to oversedation and narcotics. PCA turned off. Continue PRN morphine and Dilaudid. Improving.  --Dr. Brody following. Starting on enteral feeds today.  --Complete Zosyn tomorrow.    DVT Prophylaxis:  Lovenox    Disposition: I expect the patient to be discharged TBD    CODE STATUS:   Code Status and Medical Interventions:   Ordered at: 12/31/18 7806     Level Of Support Discussed With:    Patient     Code Status:    CPR     Medical Interventions (Level of Support Prior to Arrest):    Full         Electronically signed by Marjorie HARMAN  MD Ank[place], 01/02/19, 1:30 PM.

## 2019-01-02 NOTE — PLAN OF CARE
Problem: Patient Care Overview  Goal: Plan of Care Review  Outcome: Ongoing (interventions implemented as appropriate)   01/02/19 1640   OTHER   Outcome Summary Patient has done much better today. Alert to person, still somewhat confused to situation. He is responding much better and answering questions appropriately. Pain controlled with IV PRN meds. Frequently belching and passing flatus. Bowel movement today. Up to chair this morning, remained there most of the day. Ambulated approximately 25 feet to the bed. Required 3 person assist. G-Tube clamped this morning, no nausea or vomitting. Tube feeds with Peptamen 1.5 at initial rate of 20ml/hr started at 1500 via Gtube. MADDY drain in place, continues to have bloody drainage. VSS. Will continue to monitor closely.    Coping/Psychosocial   Plan of Care Reviewed With patient;family   Plan of Care Review   Progress improving       Problem: Fall Risk (Adult)  Goal: Absence of Fall  Outcome: Outcome(s) achieved Date Met: 01/02/19      Problem: Skin Injury Risk (Adult)  Goal: Identify Related Risk Factors and Signs and Symptoms  Outcome: Ongoing (interventions implemented as appropriate)

## 2019-01-02 NOTE — PLAN OF CARE
Problem: Patient Care Overview  Goal: Plan of Care Review  Outcome: Ongoing (interventions implemented as appropriate)   01/02/19 7790   OTHER   Outcome Summary Pt continues to be sedated, opens eyes to voice, responds to greeting but does not respond to questions or follow commands. Pt receiving IV morphine every 4 hrs for indications of pain. Pt belching frequently, especially after being turned/repositioned. Unable to verify total amount of output from MADDY drain as it keeps coming open, spilling out onto sheets, sanguinenous. PEG tube drainage continues drainage to gravity, green in appearance. VSS, currently maintaining in NSR on room air.    Coping/Psychosocial   Plan of Care Reviewed With patient   Plan of Care Review   Progress no change       Problem: Fall Risk (Adult)  Goal: Absence of Fall  Outcome: Ongoing (interventions implemented as appropriate)      Problem: Skin Injury Risk (Adult)  Goal: Skin Health and Integrity  Outcome: Ongoing (interventions implemented as appropriate)

## 2019-01-02 NOTE — CONSULTS
Clinical Nutrition     Nutrition Support Assessment  Reason for Visit:   Physician consult, EN      Patient Name: Todd Phillips  YOB: 1950  MRN: [id number]  Date of Encounter: 01/02/19 2:02 PM  Admission date: 12/31/2018      Comments:   Recommend:  Peptamen 1.5:  Goal rate of 60 mL/hr with 2 Prostat per day  20 mL/hr water flush  Initiate EN at 20 mL/hr x12 hours then slow advancement by 10 mL every 12 hours to goal rate of 60 mL/hr    Close monitoring of tolerance.        Nutrition Assessment   Assessment       Hospital Problem List    Malignant neoplasm of head of pancreas (CMS/[facility])    Hyperlipidemia    COPD (chronic obstructive pulmonary disease) (CMS/[facility])    Essential hypertension    Gastroesophageal reflux disease without esophagitis    DM2 (diabetes mellitus, type 2) (CMS/[facility])    Paroxysmal atrial fibrillation    Chronic anticoagulation, Eliquis    Post-operative confusion and somnolence, likely due to oversedation      PMH: He  has a past medical history of Antral gastritis, Asthma, Atrial fibrillation (CMS/[facility]), Chest pain, COPD (chronic obstructive pulmonary disease) (CMS/[facility]), Coronary artery disease, Diabetes mellitus (CMS/[facility]), Duodenitis, Elevated cholesterol, Emphysema of lung (CMS/[facility]), Gallbladder disease, GERD (gastroesophageal reflux disease), Hard to intubate, Hyperlipidemia, Hypertension, Jaundice, Kidney stone, Kidney stones, Malignant neoplasm of head of pancreas (CMS/[facility]) (9/5/2018), Nausea, Obstructive sleep apnea on CPAP, Palpitations, Pneumonia (08/2018), Reflux esophagitis, Renal failure, and Sepsis (CMS/[facility]).   PSxH: He  has a past surgical history that includes Upper gastrointestinal endoscopy (08/30/2012); Cardiac catheterization (11/01/1999); Kidney stone surgery; Echo - Converted (09/2012); Cardiovascular stress test (09/2012); cystoscopy bladder stone lithotripsy; Kidney stone surgery; Bile duct stent placement; Colonoscopy; Tonsillectomy; WHIPPLE  "PROCEDURE, BIOPSY OF LIVER, PORTAL VEIN RESECTION AND REPAIR, G/J TUBE PLACEMENT (N/A, 12/31/2018); INSERTION OF PORTACATH (Right, 10/23/2018); ENDOSCOPIC RETROGRADE CHOLANGIOPANCREATOGRAPHY (N/A, 10/1/2018); ENDOSCOPIC ULTRASOUND (UPPER) (N/A, 9/28/2018); ENDOSCOPIC RETROGRADE CHOLANGIOPANCREATOGRAPHY WITH PAPILLOTOMY (N/A, 8/14/2018); and CHOLECYSTECTOMY LAPAROSCOPIC (N/A, 8/10/2018).     Other nutrition related factors:    12/31/18: S/P Whipple with gastrojejunostomy tube placement      Anthropometrics     Height: 182.9 cm (72\")  Last filed wt: Weight: (ht=72in, uv=121pm (per EPIC; method unknown); BMI=27) (01/01/19 1523)       BMI: BMI (Calculated): 27.4  Overweight: 25.0-29.9kg/m2     Ideal Body Weight (IBW) (kg): 82.07  Admission wt:201 lb 12.8 oz--no method recorded    wife reports pt has gained 6lb during the past month, while he was receiving PN.  Wife gives wt hx that pt lost 40lb unintentionally from Aug-Nov 2018 and now pt has regained 6lb of that wt lost (during Dec 2018)      Labs reviewed     Results from last 7 days   Lab Units  01/02/19   0609  01/01/19   0542  12/31/18   1343   GLUCOSE mg/dL  195*  199*  239*   BUN mg/dL  22  22  23   CREATININE mg/dL  0.92  1.03  1.20   SODIUM mmol/L  138  133  130*   CHLORIDE mmol/L  106  101  102   POTASSIUM mmol/L  3.5  4.2  5.3   PHOSPHORUS mg/dL   --    --   3.7   MAGNESIUM mg/dL  1.9  1.6  1.7   ALT (SGPT) U/L   --    --   28     Results from last 7 days   Lab Units  12/31/18   1343   ALBUMIN g/dL  3.60       Results from last 7 days   Lab Units  01/02/19   1114  01/02/19   0729  01/01/19   2041  01/01/19   1614  01/01/19   1203  01/01/19   0722   GLUCOSE mg/dL  155*  216*  155*  240*  159*  201*     Lab Results   Lab Value Date/Time    HGBA1C 6.10 (H) 01/01/2019 0542    HGBA1C 7.00 (H) 12/08/2018 1513         Medications reviewed   Yes      Intake/Ouptut 24 hrs (7:00AM - 6:59 AM)     Intake & Output (last day)       01/01 0701 - 01/02 0700 01/02 0701 - " "01/03 0700    P.O. 0 0    I.V. (mL/kg) 2000 (21.9)     IV Piggyback 100 50    Total Intake(mL/kg) 2100 (23) 50 (0.5)    Urine (mL/kg/hr) 800 (0.4)     Emesis/NG output 175     Drains 10 110    Stool 0     Blood      Wound 0     Total Output 985 110    Net +1115 -60          Urine Unmeasured Occurrence 1 x     Stool Unmeasured Occurrence 1 x             Needs Assessment       Height used 72\"   Weight used Actual wt 201 lb     lbs     Estimated need Method/Equation used Result    Energy/Calorie need   2100 kcals/d 25 kcal/VIG=2550 kcal  25 kcal/actual= 2300 kcal  28 kcal/IBW= 2271 kcal              Protein   128 g/day 1.2 - 2g/kg/day (IBW)=  gm/day        Current Nutrition Prescription     PO: NPO Diet NPO Except: Ice Chips, Sips With Meds  Orders Placed This Encounter      Snack: Chewing gum x30 minutes three times daily to increase saliva flow and provide gas pain relief. Do not give to ileostomy patients.      Diet, Tube Feeding Tube Feeding Formula: Per RD          Nutrition Diagnosis       Problem Altered GI function   Etiology S/p Whipple, GJ tube placed   Signs/Symptoms Prior PN need for EN via J-tube     Problem Inadequate energy intake   Etiology Pancreatic CA, altered GI function   Signs/Symptoms J-tube placed for EN       Nutrition Intervention   1.  Nutrition support order placed  Route: Jejunostomy  Recommend:  Peptamen 1.5:  Goal rate of 60 mL/hr with 2 Prostat per day  20 mL/hr water flush  Initiate EN at 20 mL/hr x12 hours then slow advancement by 10 mL every 12 hours to goal rate of 60 mL/hr       At Target Goal Volume     % Est needs   Volume  1320ml     Energy/kcals 2180 kcals 100%   Protein 120g pro 100%   Fiber 0 g         Fluid via EN  1016 mL    Total Fluid  1456 mL    Meets 100% RDI Yes          Goal:   General: Nutrition support treatment  PO: initiate po when medically appropriate  EN/PN: Initiate EN, Establish EN tolerance          Monitoring/Evaluation:   Per protocol, I&O, " Pertinent labs, EN delivery/tolerance, Weight, GI status, Symptoms      Will Continue to follow per protocol      Reta Gamble RD  Time Spent: 60 min

## 2019-01-02 NOTE — PROGRESS NOTES
"Patient Name:  Todd Phillips  YOB: 1950  [id number]    Surgery Progress Note    Date of visit: 1/2/2019    Subjective   Subjective: Remains somnolent this AM, but more interactive than yesterday.  Oriented to person but not time or place.  Vitals signs stable and abdominal exam unchanged.  Sat in chair for short time yesterday.          Objective     Objective:     /88 (BP Location: Right arm, Patient Position: Lying)   Pulse 105   Temp 99.4 °F (37.4 °C) (Oral)   Resp 18   Ht 182.9 cm (72\")   Wt 91.5 kg (201 lb 12.8 oz)   SpO2 94%   BMI 27.37 kg/m²     Intake/Output Summary (Last 24 hours) at 1/2/2019 0731  Last data filed at 1/2/2019 0717  Gross per 24 hour   Intake 2100 ml   Output 1015 ml   Net 1085 ml       CV:  Rhythm regular and rate slightly tachycardic in low 100s  L:  Slightly coarse bilaterally  Abd:  Non distended, soft, appropriately tender, GJ tube intact and draining thin bilious fluid, MADDY draining small amount of serosanguinous fluid   Ext:  No cyanosis, clubbing, edema    Labs that are back at this time have been reviewed.  Labs overall benign - no major electrolyte abnormalities, no JOVANNA, no leukocytosis.   Hgb/Hct = 8.2/24.8       Assessment/Plan     Assessment/ Plan:    69 yo man with pancreatic malignancy of uncinate process, POD 2 s/p Whipple      - overall stable and more interactive but remains confused and sedated   - continue pain and nausea control as needed.  Now off PCA and controlled with morphine.   - incentive spirometry and out of bed/ambulate   - IV abx day 2/3  - continue IVF until patient is more alert and can tolerate PO          Hospital Problem List     * (Principal) Malignant neoplasm of head of pancreas (CMS/HCC)            Hyperlipidemia (Chronic)        COPD (chronic obstructive pulmonary disease) (CMS/HCC) (Chronic)        Essential hypertension (Chronic)        Gastroesophageal reflux disease without esophagitis (Chronic)    DM2 (diabetes " mellitus, type 2) (CMS/HCC) (Chronic)        Paroxysmal atrial fibrillation (Chronic)    Chronic anticoagulation, eliquis (Chronic)    Laboratory test              Nisha Ackerman MD  1/2/2019  7:31 AM

## 2019-01-03 ENCOUNTER — DOCUMENTATION (OUTPATIENT)
Dept: OTHER | Facility: HOSPITAL | Age: 69
End: 2019-01-03

## 2019-01-03 ENCOUNTER — APPOINTMENT (OUTPATIENT)
Dept: GENERAL RADIOLOGY | Facility: HOSPITAL | Age: 69
End: 2019-01-03

## 2019-01-03 PROBLEM — I48.91 ATRIAL FIBRILLATION WITH RVR (HCC): Status: ACTIVE | Noted: 2019-01-03

## 2019-01-03 LAB
ALBUMIN SERPL-MCNC: 2.93 G/DL (ref 3.2–4.8)
ALP SERPL-CCNC: 59 U/L (ref 25–100)
ALT SERPL W P-5'-P-CCNC: 21 U/L (ref 7–40)
AMYLASE FLD-CCNC: 1802 U/L
ANION GAP SERPL CALCULATED.3IONS-SCNC: 8 MMOL/L (ref 3–11)
AST SERPL-CCNC: 15 U/L (ref 0–33)
BILIRUB SERPL-MCNC: 0.7 MG/DL (ref 0.3–1.2)
BUN BLD-MCNC: 25 MG/DL (ref 9–23)
CALCIUM SPEC-SCNC: 8 MG/DL (ref 8.7–10.4)
CHLORIDE SERPL-SCNC: 107 MMOL/L (ref 99–109)
CHOLEST SERPL-MCNC: 68 MG/DL (ref 0–200)
CO2 SERPL-SCNC: 24 MMOL/L (ref 20–31)
CREAT BLD-MCNC: 0.95 MG/DL (ref 0.6–1.3)
DEPRECATED RDW RBC AUTO: 55.1 FL (ref 37–54)
ERYTHROCYTE [DISTWIDTH] IN BLOOD BY AUTOMATED COUNT: 16.5 % (ref 11.3–14.5)
GLUCOSE BLD-MCNC: 207 MG/DL (ref 70–100)
GLUCOSE BLDC GLUCOMTR-MCNC: 190 MG/DL (ref 70–130)
GLUCOSE BLDC GLUCOMTR-MCNC: 213 MG/DL (ref 70–130)
GLUCOSE BLDC GLUCOMTR-MCNC: 235 MG/DL (ref 70–130)
HCT VFR BLD AUTO: 24.8 % (ref 38.9–50.9)
HGB BLD-MCNC: 8.1 G/DL (ref 13.1–17.5)
MAGNESIUM SERPL-MCNC: 1.9 MG/DL (ref 1.3–2.7)
MCH RBC QN AUTO: 30 PG (ref 27–31)
MCHC RBC AUTO-ENTMCNC: 32.7 G/DL (ref 32–36)
MCV RBC AUTO: 91.9 FL (ref 80–99)
PHOSPHATE SERPL-MCNC: 2.3 MG/DL (ref 2.4–5.1)
PLATELET # BLD AUTO: 227 10*3/MM3 (ref 150–450)
PMV BLD AUTO: 10.1 FL (ref 6–12)
POTASSIUM BLD-SCNC: 3.4 MMOL/L (ref 3.5–5.5)
PROT SERPL-MCNC: 4.8 G/DL (ref 5.7–8.2)
RBC # BLD AUTO: 2.7 10*6/MM3 (ref 4.2–5.76)
SODIUM BLD-SCNC: 139 MMOL/L (ref 132–146)
TRIGL SERPL-MCNC: 77 MG/DL (ref 0–150)
WBC NRBC COR # BLD: 8.19 10*3/MM3 (ref 3.5–10.8)

## 2019-01-03 PROCEDURE — 83735 ASSAY OF MAGNESIUM: CPT

## 2019-01-03 PROCEDURE — 82962 GLUCOSE BLOOD TEST: CPT

## 2019-01-03 PROCEDURE — 71045 X-RAY EXAM CHEST 1 VIEW: CPT

## 2019-01-03 PROCEDURE — 84478 ASSAY OF TRIGLYCERIDES: CPT

## 2019-01-03 PROCEDURE — 25010000002 MORPHINE PER 10 MG: Performed by: SURGERY

## 2019-01-03 PROCEDURE — 82150 ASSAY OF AMYLASE: CPT | Performed by: SURGERY

## 2019-01-03 PROCEDURE — 93010 ELECTROCARDIOGRAM REPORT: CPT | Performed by: INTERNAL MEDICINE

## 2019-01-03 PROCEDURE — 93005 ELECTROCARDIOGRAM TRACING: CPT | Performed by: HOSPITALIST

## 2019-01-03 PROCEDURE — 94640 AIRWAY INHALATION TREATMENT: CPT

## 2019-01-03 PROCEDURE — 80053 COMPREHEN METABOLIC PANEL: CPT

## 2019-01-03 PROCEDURE — 99233 SBSQ HOSP IP/OBS HIGH 50: CPT | Performed by: HOSPITALIST

## 2019-01-03 PROCEDURE — 82465 ASSAY BLD/SERUM CHOLESTEROL: CPT

## 2019-01-03 PROCEDURE — 94799 UNLISTED PULMONARY SVC/PX: CPT

## 2019-01-03 PROCEDURE — 85027 COMPLETE CBC AUTOMATED: CPT | Performed by: SURGERY

## 2019-01-03 PROCEDURE — 25010000002 NA FERRIC GLUC CPLX PER 12.5 MG

## 2019-01-03 PROCEDURE — 25010000002 PIPERACILLIN SOD-TAZOBACTAM PER 1 G: Performed by: SURGERY

## 2019-01-03 PROCEDURE — 25010000002 ONDANSETRON PER 1 MG: Performed by: SURGERY

## 2019-01-03 PROCEDURE — 25010000002 HYDROMORPHONE 1 MG/ML SOLUTION: Performed by: SURGERY

## 2019-01-03 PROCEDURE — 84100 ASSAY OF PHOSPHORUS: CPT

## 2019-01-03 PROCEDURE — 97116 GAIT TRAINING THERAPY: CPT

## 2019-01-03 PROCEDURE — 94760 N-INVAS EAR/PLS OXIMETRY 1: CPT

## 2019-01-03 RX ORDER — POTASSIUM CHLORIDE 7.45 MG/ML
10 INJECTION INTRAVENOUS
Status: DISCONTINUED | OUTPATIENT
Start: 2019-01-03 | End: 2019-01-15 | Stop reason: HOSPADM

## 2019-01-03 RX ORDER — POTASSIUM CHLORIDE 750 MG/1
40 CAPSULE, EXTENDED RELEASE ORAL AS NEEDED
Status: DISCONTINUED | OUTPATIENT
Start: 2019-01-03 | End: 2019-01-15 | Stop reason: HOSPADM

## 2019-01-03 RX ORDER — POTASSIUM CHLORIDE 1.5 G/1.77G
40 POWDER, FOR SOLUTION ORAL AS NEEDED
Status: DISCONTINUED | OUTPATIENT
Start: 2019-01-03 | End: 2019-01-15 | Stop reason: HOSPADM

## 2019-01-03 RX ORDER — METOPROLOL TARTRATE 5 MG/5ML
5 INJECTION INTRAVENOUS
Status: DISCONTINUED | OUTPATIENT
Start: 2019-01-03 | End: 2019-01-05

## 2019-01-03 RX ADMIN — ONDANSETRON 4 MG: 2 INJECTION INTRAMUSCULAR; INTRAVENOUS at 11:02

## 2019-01-03 RX ADMIN — PREGABALIN 75 MG: 75 CAPSULE ORAL at 12:24

## 2019-01-03 RX ADMIN — SODIUM CHLORIDE 250 MG: 9 INJECTION, SOLUTION INTRAVENOUS at 12:00

## 2019-01-03 RX ADMIN — TAZOBACTAM SODIUM AND PIPERACILLIN SODIUM 3.38 G: 375; 3 INJECTION, SOLUTION INTRAVENOUS at 00:12

## 2019-01-03 RX ADMIN — HYDROMORPHONE HYDROCHLORIDE 1 MG: 1 INJECTION, SOLUTION INTRAMUSCULAR; INTRAVENOUS; SUBCUTANEOUS at 08:52

## 2019-01-03 RX ADMIN — HYDROMORPHONE HYDROCHLORIDE 1 MG: 1 INJECTION, SOLUTION INTRAMUSCULAR; INTRAVENOUS; SUBCUTANEOUS at 16:46

## 2019-01-03 RX ADMIN — ACETAMINOPHEN 649.6 MG: 650 SOLUTION ORAL at 18:01

## 2019-01-03 RX ADMIN — METOPROLOL TARTRATE 25 MG: 25 TABLET ORAL at 18:00

## 2019-01-03 RX ADMIN — BUDESONIDE 0.5 MG: 0.5 INHALANT RESPIRATORY (INHALATION) at 09:01

## 2019-01-03 RX ADMIN — ACETAMINOPHEN 1000 MG: 650 SOLUTION ORAL at 23:53

## 2019-01-03 RX ADMIN — ACETAMINOPHEN 1000 MG: 650 SOLUTION ORAL at 12:24

## 2019-01-03 RX ADMIN — PANTOPRAZOLE SODIUM 40 MG: 40 TABLET, DELAYED RELEASE ORAL at 05:43

## 2019-01-03 RX ADMIN — TAZOBACTAM SODIUM AND PIPERACILLIN SODIUM 3.38 G: 375; 3 INJECTION, SOLUTION INTRAVENOUS at 23:54

## 2019-01-03 RX ADMIN — FLECAINIDE ACETATE 50 MG: 50 TABLET ORAL at 21:29

## 2019-01-03 RX ADMIN — ALLOPURINOL 100 MG: 100 TABLET ORAL at 21:29

## 2019-01-03 RX ADMIN — MORPHINE SULFATE 2 MG: 2 INJECTION, SOLUTION INTRAMUSCULAR; INTRAVENOUS at 10:00

## 2019-01-03 RX ADMIN — HYDROMORPHONE HYDROCHLORIDE 1 MG: 1 INJECTION, SOLUTION INTRAMUSCULAR; INTRAVENOUS; SUBCUTANEOUS at 12:23

## 2019-01-03 RX ADMIN — FLUOXETINE HYDROCHLORIDE 20 MG: 20 CAPSULE ORAL at 12:23

## 2019-01-03 RX ADMIN — MORPHINE SULFATE 2 MG: 2 INJECTION, SOLUTION INTRAMUSCULAR; INTRAVENOUS at 23:54

## 2019-01-03 RX ADMIN — ONDANSETRON 4 MG: 2 INJECTION INTRAMUSCULAR; INTRAVENOUS at 23:54

## 2019-01-03 RX ADMIN — INSULIN LISPRO 2 UNITS: 100 INJECTION, SOLUTION INTRAVENOUS; SUBCUTANEOUS at 21:30

## 2019-01-03 RX ADMIN — MONTELUKAST SODIUM 10 MG: 10 TABLET, FILM COATED ORAL at 21:29

## 2019-01-03 RX ADMIN — HYDROMORPHONE HYDROCHLORIDE 1 MG: 1 INJECTION, SOLUTION INTRAMUSCULAR; INTRAVENOUS; SUBCUTANEOUS at 02:07

## 2019-01-03 RX ADMIN — TAZOBACTAM SODIUM AND PIPERACILLIN SODIUM 3.38 G: 375; 3 INJECTION, SOLUTION INTRAVENOUS at 16:46

## 2019-01-03 RX ADMIN — FLUOXETINE HYDROCHLORIDE 20 MG: 20 CAPSULE ORAL at 21:29

## 2019-01-03 RX ADMIN — COLCHICINE 0.6 MG: 0.6 TABLET, FILM COATED ORAL at 12:23

## 2019-01-03 RX ADMIN — BUDESONIDE 0.5 MG: 0.5 INHALANT RESPIRATORY (INHALATION) at 20:16

## 2019-01-03 RX ADMIN — HYDROMORPHONE HYDROCHLORIDE 1 MG: 1 INJECTION, SOLUTION INTRAMUSCULAR; INTRAVENOUS; SUBCUTANEOUS at 05:41

## 2019-01-03 RX ADMIN — METOPROLOL TARTRATE 5 MG: 5 INJECTION, SOLUTION INTRAVENOUS at 11:25

## 2019-01-03 RX ADMIN — FLECAINIDE ACETATE 50 MG: 50 TABLET ORAL at 12:23

## 2019-01-03 RX ADMIN — ACETAMINOPHEN 1000 MG: 650 SOLUTION ORAL at 00:12

## 2019-01-03 NOTE — PROGRESS NOTES
"    [facility] Medicine Services  PROGRESS NOTE    Patient Name: Todd Phillips  : 1950  MRN: [id number]    Date of Admission: 2018  Length of Stay: 3  Primary Care Physician: Adiel Denney MD    Subjective   Subjective     CC:  F/U pancreatic cancer s/p Whipple    HPI:  Patient seen this morning. Complains of some difficulty breathing, just got done working with PT. Mental status much better. Complains of abdominal pain. \"I feel terrible.\"    Review of Systems  CV-no chest pain, no palpitations  Resp-no cough, +dyspnea  GI-no N/V/D, +abd pain    Otherwise ROS is negative except as mentioned in the HPI.    Objective   Objective     Vital Signs:   Temp:  [98 °F (36.7 °C)-100.1 °F (37.8 °C)] 98 °F (36.7 °C)  Heart Rate:  [] 97  Resp:  [16-20] 20  BP: (112-171)/() 171/101        Physical Exam:  Gen-no acute distress  HENT-NCAT, mucous membranes moist  CV-RRR, S1 S2 normal, no m/r/g  Resp-CTAB, no wheezes or rales, some increased respirations (just got done working with PT)  Abd-soft, mild to moderate TTP, ND, +BS  Ext-no edema  Neuro-awake, alert, no focal deficits  Skin-no rashes  Psych-appropriate mood      Results Reviewed:  I have personally reviewed current lab, radiology, and data and agree.    Results from last 7 days   Lab Units  19   0639  19   0609  19   0542   WBC 10*3/mm3  8.19  9.27  12.54*   HEMOGLOBIN g/dL  8.1*  8.2*  9.9*   HEMATOCRIT %  24.8*  24.8*  30.1*   PLATELETS 10*3/mm3  227  202  232   INR    --    --   1.09     Results from last 7 days   Lab Units  19   0639  19   0609  19   0542  18   1343   SODIUM mmol/L  139  138  133  130*   POTASSIUM mmol/L  3.4*  3.5  4.2  5.3   CHLORIDE mmol/L  107  106  101  102   CO2 mmol/L  24.0  23.0  22.0  21.0   BUN mg/dL  25*  22  22  23   CREATININE mg/dL  0.95  0.92  1.03  1.20   GLUCOSE mg/dL  207*  195*  199*  239*   CALCIUM mg/dL  8.0*  7.8*  8.3*  8.4*   ALT " (SGPT) U/L  21   --    --   28   AST (SGOT) U/L  15   --    --   47*     Estimated Creatinine Clearance: 93.6 mL/min (by C-G formula based on SCr of 0.95 mg/dL).    No results found for: BNP    Microbiology Results Abnormal     None          Imaging Results (last 24 hours)     ** No results found for the last 24 hours. **          Results for orders placed during the hospital encounter of 10/22/17   Adult Transthoracic Echo Complete W/ Cont if Necessary Per Protocol    Narrative · Left ventricular systolic function is normal. Estimated EF = 60%.  · The cardiac valves are anatomically and functionally normal.          I have reviewed the medications:    Current Facility-Administered Medications:   •  acetaminophen (TYLENOL) 160 MG/5ML solution 1,000 mg, 1,000 mg, Oral, Q6H, Miquel Brody MD, 1,000 mg at 01/03/19 1224  •  albuterol (PROVENTIL) nebulizer solution 0.083% 2.5 mg/3mL, 2.5 mg, Nebulization, Q4H PRN, Miquel Brody MD  •  albuterol (PROVENTIL) nebulizer solution 0.083% 2.5 mg/3mL, 2.5 mg, Nebulization, BID PRN, Miquel Brody MD  •  allopurinol (ZYLOPRIM) tablet 100 mg, 100 mg, Oral, Nightly, Miquel Brody MD, 100 mg at 01/02/19 2139  •  budesonide (PULMICORT) nebulizer solution 0.5 mg, 0.5 mg, Nebulization, BID - RT, Miquel Brody MD, 0.5 mg at 01/03/19 0901  •  colchicine tablet 0.6 mg, 0.6 mg, Oral, Daily, Miquel Brody MD, 0.6 mg at 01/03/19 1223  •  enoxaparin (LOVENOX) syringe 40 mg, 40 mg, Subcutaneous, Q24H, Miquel Brody MD, 40 mg at 01/02/19 1122  •  ferric gluconate (FERRLECIT) 250 mg in Sodium chloride 0.9 % 120 mL IVPB, 250 mg, Intravenous, Once, Ignacio Beth, [facility]  •  flecainide (TAMBOCOR) tablet 50 mg, 50 mg, Oral, Q12H, Miquel Brody MD, 50 mg at 01/03/19 1223  •  FLUoxetine (PROzac) capsule 20 mg, 20 mg, Oral, BID, Miquel Brody MD, 20 mg at 01/03/19 1223  •  HYDROmorphone (DILAUDID) injection  1 mg, 1 mg, Intravenous, Q2H PRN, Miquel Brody MD, 1 mg at 01/03/19 1223  •  insulin lispro (humaLOG) injection 0-9 Units, 0-9 Units, Subcutaneous, 4x Daily With Meals & Nightly, Miquel Brody MD, 4 Units at 01/02/19 2245  •  Magnesium Sulfate 2 gram Bolus, followed by 8 gram infusion (total Mg dose 10 grams)- Mg less than or equal to 1mg/dL, 2 g, Intravenous, PRN **OR** Magnesium Sulfate 2 gram / 50mL Infusion (GIVE X 3 BAGS TO EQUAL 6GM TOTAL DOSE) - Mg 1.1 - 1.5 mg/dl, 2 g, Intravenous, PRN **OR** Magnesium Sulfate 4 gram infusion- Mg 1.6-1.9 mg/dL, 4 g, Intravenous, PRN, Andreina Beltran MD, Last Rate: 25 mL/hr at 01/01/19 1651, 4 g at 01/01/19 1651  •  metoprolol tartrate (LOPRESSOR) injection 5 mg, 5 mg, Intravenous, Q5 Min PRN, Marjorie Lechuga MD, 5 mg at 01/03/19 1125  •  montelukast (SINGULAIR) tablet 10 mg, 10 mg, Oral, Nightly, Miquel Brody MD, 10 mg at 01/02/19 2139  •  morphine injection 2 mg, 2 mg, Intravenous, Q4H PRN, Miquel Brody MD, 2 mg at 01/03/19 1000  •  naloxone (NARCAN) injection 0.1 mg, 0.1 mg, Intravenous, Q5 Min PRN, Miquel Brody MD  •  nitroglycerin (NITROSTAT) SL tablet 0.4 mg, 0.4 mg, Sublingual, PRN, Miquel Brody MD  •  ondansetron (ZOFRAN) injection 4 mg, 4 mg, Intravenous, Q6H PRN, Miquel Brody MD, 4 mg at 01/03/19 1102  •  oxyCODONE (ROXICODONE) 5 MG/5ML solution 10 mg, 10 mg, Per G Tube, Q4H PRN, Miquel Brody MD  •  oxyCODONE (ROXICODONE) 5 MG/5ML solution 7.5 mg, 7.5 mg, Per G Tube, Q4H PRN, Miquel Brody MD  •  pantoprazole (PROTONIX) EC tablet 40 mg, 40 mg, Oral, QAM, Miquel Brody MD, 40 mg at 01/03/19 0543  •  piperacillin-tazobactam (ZOSYN) 3.375 g in iso-osmotic dextrose 50 ml (premix), 3.375 g, Intravenous, Q8H, Miquel Brody MD, 3.375 g at 01/03/19 0012  •  potassium chloride (MICRO-K) CR capsule 40 mEq, 40 mEq, Oral, PRN **OR** potassium chloride  (KLOR-CON) packet 40 mEq, 40 mEq, Oral, PRN **OR** potassium chloride 10 mEq in 100 mL IVPB, 10 mEq, Intravenous, Q1H PRN, Marjorie Lechuga MD  •  pregabalin (LYRICA) capsule 75 mg, 75 mg, Oral, Daily, Miquel Brody MD, 75 mg at 01/03/19 1224  •  PRO-STAT 1 packet, 1 packet, Per J Tube, BID, Reta Gamble RD, 1 packet at 01/02/19 2324  •  prochlorperazine (COMPAZINE) injection 5 mg, 5 mg, Intravenous, Q6H PRN **OR** prochlorperazine (COMPAZINE) tablet 5 mg, 5 mg, Oral, Q6H PRN **OR** prochlorperazine (COMPAZINE) suppository 25 mg, 25 mg, Rectal, Q12H PRN, Miquel Brody MD  •  Scopolamine (TRANSDERM-SCOP) 1.5 MG/3DAYS patch 1 patch, 1 patch, Transdermal, Q72H, Miquel Brody MD, 1 patch at 01/01/19 1139  •  sennosides-docusate sodium (SENOKOT-S) 8.6-50 MG tablet 2 tablet, 2 tablet, Oral, Nightly, Miquel Brody MD, 2 tablet at 01/02/19 2139    Facility-Administered Medications Ordered in Other Encounters:   •  sodium chloride 0.9 % infusion  - ADS Override Pull, , , ,   •  sodium chloride 0.9 % infusion  - ADS Override Pull, , , ,       Assessment/Plan   Assessment / Plan     Active Hospital Problems    Diagnosis Date Noted   • **Malignant neoplasm of head of pancreas (CMS/HCC) [C25.0] 09/05/2018     Added automatically from request for surgery [id number]     • Atrial fibrillation with RVR (CMS/HCC) [I48.91] 01/03/2019   • Post-operative confusion and somnolence, likely due to oversedation [R41.0] 01/02/2019   • Chronic anticoagulation, Eliquis [Z79.01] 10/27/2017   • Paroxysmal atrial fibrillation [I48.91] 10/22/2017   • DM2 (diabetes mellitus, type 2) (CMS/[facility]) [E11.9] 10/22/2017   • Gastroesophageal reflux disease without esophagitis [K21.9] 02/09/2017   • COPD (chronic obstructive pulmonary disease) (CMS/[facility]) [J44.9] 06/16/2016   • Hyperlipidemia [E78.5] 06/16/2016   • Essential hypertension [I10] 06/16/2016          Brief Hospital Course to date:  Todd Phillips is a  68 y.o. male with hx of pancreatic cancer, COPD, diabetes mellitus type 2, PAF, GERD, essential hypertension, and hyperlipidemia who was admitted by Dr. Brody 12/31/18 and underwent planned Whipple for locally advanced pancreatic cancer. Hospitalists consulted post-operatively for medical management.    PLAN:  --Post-op confusion and somnolence likely due to oversedation and narcotics. PCA turned off. Continue PRN morphine and Dilaudid. Much improved today.  --Dr. Brody following. Started on enteral feeds 1/2.  --Went into Afib with RVR with chest pain late this morning following PT session. Will give IV Metoprolol to see if we can slow rate down. If no improvement will initiate Cardizem drip and consult Cardiology. Replace K. Continue Flecainide. Not on a beta blocker at home? Resume Eliquis when okay with Dr. Brody.   --PT/OT.    DVT Prophylaxis:  Lovenox    Disposition: I expect the patient to be discharged TBD    CODE STATUS:   Code Status and Medical Interventions:   Ordered at: 12/31/18 1604     Level Of Support Discussed With:    Patient     Code Status:    CPR     Medical Interventions (Level of Support Prior to Arrest):    Full         Electronically signed by Marjorie Lechuga MD, 01/03/19, 12:46 PM.

## 2019-01-03 NOTE — THERAPY TREATMENT NOTE
Acute Care - Physical Therapy Treatment Note  [facility]     Patient Name: Todd Phillips  : 1950  MRN: [id number]  Today's Date: 1/3/2019  Onset of Illness/Injury or Date of Surgery: 18  Date of Referral to PT: 18  Referring Physician: Mary Grace    Admit Date: 2018    Visit Dx:    ICD-10-CM ICD-9-CM   1. Impaired functional mobility, balance, gait, and endurance Z74.09 V49.89   2. Laboratory test Z01.89 V72.60   3. Pancreatic cancer (CMS/HCC) C25.9 157.9     Patient Active Problem List   Diagnosis   • Hyperlipidemia   • COPD (chronic obstructive pulmonary disease) (CMS/HCC)   • Essential hypertension   • Gout without tophus   • Anxiety and depression   • Primary insomnia   • Gastroesophageal reflux disease without esophagitis   • Nonobstructive atherosclerosis of coronary artery   • CKD stage 3 secondary to diabetes (CMS/HCC)   • DM2 (diabetes mellitus, type 2) (CMS/[facility])   • Paroxysmal atrial fibrillation   • Chronic anticoagulation, Eliquis   • Encounter for monitoring flecainide therapy   • Malignant neoplasm of head of pancreas (CMS/HCC)   • Bacteremia due to Escherichia coli   • Biliary obstruction   • Thrombocytopenia (CMS/HCC)   • Anemia due to chemotherapy   • Fever   • Post-operative confusion and somnolence, likely due to oversedation       Therapy Treatment    Rehabilitation Treatment Summary     Row Name 19 0935             Treatment Time/Intention    Discipline  physical therapist  -VG      Document Type  therapy note (daily note)  -VG      Subjective Information  complains of;weakness;fatigue;pain  -VG      Mode of Treatment  individual therapy;physical therapy  -VG      Patient/Family Observations  In bed, tube feeding, ostomy, IV, RA, tele; family present.  -VG      Care Plan Review  patient/other agree to care plan  -VG      Care Plan Review, Other Participant(s)  family  -VG      Therapy Frequency (PT Clinical Impression)  daily  -VG      Patient Effort  good  -VG       Existing Precautions/Restrictions  fall;other (see comments) ostomy, feeding tube, abd inc  -VG      Recorded by [VG] Maribeth Lynn, PT 01/03/19 1015      Row Name 01/03/19 0935             Cognitive Assessment/Intervention    Additional Documentation  Cognitive Assessment/Intervention (Group)  -VG      Recorded by [VG] Maribeth Lynn, PT 01/03/19 1015      Row Name 01/03/19 0935             Cognitive Assessment/Intervention- PT/OT    Affect/Mental Status (Cognitive)  flat/blunted affect;confused  -VG      Orientation Status (Cognition)  oriented to;person  -VG      Follows Commands (Cognition)  follows one step commands;50-74% accuracy;physical/tactile prompts required;repetition of directions required;verbal cues/prompting required  -VG      Cognitive Function (Cognitive)  attention deficit;executive function deficit;memory deficit;safety deficit  -VG      Attention Deficit (Cognitive)  moderate deficit;requires cues/redirection to task  -VG      Executive Function Deficit (Cognition)  moderate deficit;initiation  -VG      Memory Deficit (Cognitive)  moderate deficit;working memory  -VG      Safety Deficit (Cognitive)  moderate deficit;insight into deficits/self awareness  -VG      Personal Safety Interventions  fall prevention program maintained;gait belt;nonskid shoes/slippers when out of bed;supervised activity  -VG      Recorded by [VG] Maribeth Lynn, PT 01/03/19 1015      Row Name 01/03/19 0935             Bed Mobility Assessment/Treatment    Bed Mobility Assessment/Treatment  supine-sit  -VG      Supine-Sit [place] (Bed Mobility)  moderate assist (50% patient effort);2 person assist;verbal cues  -VG      Bed Mobility, Safety Issues  cognitive deficits limit understanding;decreased use of arms for pushing/pulling;decreased use of legs for bridging/pushing  -VG      Assistive Device (Bed Mobility)  bed rails;head of bed elevated;draw sheet  -VG      Comment (Bed Mobility)  MODA x2 at trunk for  supine to sitting EOB. Cues for upright posture and maintaining midline, pt leans posteriorly. MODA for scooting hips to EOB.  -VG      Recorded by [VG] Maribeth Lynn, PT 01/03/19 1015      Row Name 01/03/19 0935             Transfer Assessment/Treatment    Transfer Assessment/Treatment  sit-stand transfer;stand-sit transfer  -VG      Comment (Transfers)  Cues for hand placement and sequencing. Inc time/effort. STS x2, progressed from MARLYN x 2 to MARLYN x 1 with RW.  -VG      Recorded by [VG] Maribeth Lynn, PT 01/03/19 1015      Row Name 01/03/19 0935             Sit-Stand Transfer    Sit-Stand [place] (Transfers)  minimum assist (75% patient effort);2 person assist;verbal cues  -VG      Assistive Device (Sit-Stand Transfers)  walker, front-wheeled  -VG      Recorded by [VG] Maribeth Lynn, PT 01/03/19 1015      Row Name 01/03/19 0935             Stand-Sit Transfer    Stand-Sit [place] (Transfers)  minimum assist (75% patient effort);2 person assist;verbal cues  -VG      Assistive Device (Stand-Sit Transfers)  walker, front-wheeled  -VG      Recorded by [VG] Maribeth Lynn, PT 01/03/19 1015      Row Name 01/03/19 0935             Gait/Stairs Assessment/Training    46200 - Gait Training Minutes   40  -VG      Gait/Stairs Assessment/Training  gait/ambulation independence  -VG2      [place] Level (Gait)  minimum assist (75% patient effort);2 person assist;verbal cues  -VG2      Assistive Device (Gait)  walker, front-wheeled  -VG2      Distance in Feet (Gait)  10  -VG2      Pattern (Gait)  step-to;step-through  -VG2      Deviations/Abnormal Patterns (Gait)  bobby decreased;festinating/shuffling;gait speed decreased;stride length decreased  -VG2      Bilateral Gait Deviations  forward flexed posture  -VG2      Comment (Gait/Stairs)  Distance limited by fatigue, pain, and slowed processing. Demonstrates improved participation/cognition this date, although still lethargic. Cues for sequencing. Slow  pace. Inc time/effort required.   -VG2      Recorded by [VG] Maribeth Lynn, PT 01/03/19 1033  [VG2] Maribeth Lynn, PT 01/03/19 1015      Row Name 01/03/19 0935             Motor Skills Assessment/Interventions    Additional Documentation  Balance (Group)  -VG      Recorded by [VG] Maribeth Lynn, PT 01/03/19 1015      Row Name 01/03/19 0935             Balance    Balance  static sitting balance;static standing balance  -VG      Recorded by [VG] Maribeth Lynn, PT 01/03/19 1015      Row Name 01/03/19 0935             Static Sitting Balance    Level of [place] (Unsupported Sitting, Static Balance)  moderate assist, 50 to 74% patient effort;contact guard assist  -VG      Sitting Position (Unsupported Sitting, Static Balance)  sitting on edge of bed  -VG      Time Able to Maintain Position (Unsupported Sitting, Static Balance)  1 to 2 minutes  -VG      Comment (Unsupported Sitting, Static Balance)  Progressed from MODA to CGA for unsupported sitting balance with cues for upright posture. Leans posteriorly.  -VG      Recorded by [VG] Maribeth Lynn, PT 01/03/19 1015      Row Name 01/03/19 0935             Static Standing Balance    Level of [place] (Supported Standing, Static Balance)  minimal assist, 75% patient effort  -VG      Time Able to Maintain Position (Supported Standing, Static Balance)  45 to 60 seconds  -VG      Assistive Device Utilized (Supported Standing, Static Balance)  rolling walker  -VG      Comment (Supported Standing, Static Balance)  Able to maintain standing with RW and MARLYN, limited by BLE weakness.  -VG      Recorded by [VG] Maribeth Lynn, PT 01/03/19 1015      Row Name 01/03/19 0935             Positioning and Restraints    Pre-Treatment Position  in bed  -VG      Post Treatment Position  chair  -VG      In Chair  sitting;call light within reach;encouraged to call for assist;exit alarm on;with family/caregiver;waffle cushion;on mechanical lift sling  -VG      Recorded  by [VG] Maribeth Lynn, PT 01/03/19 1015      Row Name 01/03/19 0935             Pain Scale: FACES Pre/Post-Treatment    Pain: FACES Scale, Pretreatment  2-->hurts little bit  -VG      Pain: FACES Scale, Post-Treatment  2-->hurts little bit  -VG      Pre/Post Treatment Pain Comment  Nsg administered pain medication following session.  -VG      Recorded by [VG] Maribeth Lynn, PT 01/03/19 1015      Row Name 01/03/19 0935             Coping    Observed Emotional State  accepting  -VG      Verbalized Emotional State  acceptance  -VG      Recorded by [VG] Maribeth Lynn, PT 01/03/19 1015      Row Name 01/03/19 0935             Plan of Care Review    Plan of Care Reviewed With  patient;family  -VG      Recorded by [VG] Maribeth Lynn, PT 01/03/19 1015      Row Name 01/03/19 0935             Outcome Summary/Treatment Plan (PT)    Daily Summary of Progress (PT)  progress toward functional goals is good  -VG      Anticipated Discharge Disposition (PT)  inpatient rehabilitation facility  -VG      Recorded by [VG] Maribeth Lynn, PT 01/03/19 1015        User Key  (r) = Recorded By, (t) = Taken By, (c) = Cosigned By    Initials Name Effective Dates Discipline    VG Maribeth Lynn, PT 05/29/18 -  PT          Wound upper;midline abdomen incision (Active)   Dressing Appearance open to air 1/2/2019  8:05 PM   Closure Staples 1/2/2019  8:05 PM   Base clean;dry 1/2/2019  8:05 PM   Periwound dry;pink 1/2/2019  8:05 PM   Periwound Temperature warm 1/2/2019  8:05 PM   Periwound Skin Turgor soft 1/2/2019  8:05 PM   Drainage Amount none 1/2/2019  8:05 PM           Physical Therapy Education     Title: PT OT SLP Therapies (In Progress)     Topic: Physical Therapy (In Progress)     Point: Mobility training (In Progress)     Learning Progress Summary           Patient Acceptance, E, NR by VG at 1/3/2019  9:35 AM    Comment:  Educated on HEP and correct mechanics for safe functional mobility. Reinforcement needed d/t impaired  safety awareness.    Acceptance, E,D, NR by SJ at 1/1/2019  2:22 PM   Family Acceptance, E, NR by VG at 1/3/2019  9:35 AM    Comment:  Educated on HEP and correct mechanics for safe functional mobility. Reinforcement needed d/t impaired safety awareness.   Significant Other Acceptance, E,D, NR by  at 1/1/2019  2:22 PM                   Point: Home exercise program (In Progress)     Learning Progress Summary           Patient Acceptance, E, NR by VG at 1/3/2019  9:35 AM    Comment:  Educated on HEP and correct mechanics for safe functional mobility. Reinforcement needed d/t impaired safety awareness.    Acceptance, E,D, NR by  at 1/1/2019  2:22 PM   Family Acceptance, E, NR by VG at 1/3/2019  9:35 AM    Comment:  Educated on HEP and correct mechanics for safe functional mobility. Reinforcement needed d/t impaired safety awareness.   Significant Other Acceptance, E,D, NR by  at 1/1/2019  2:22 PM                   Point: Body mechanics (In Progress)     Learning Progress Summary           Patient Acceptance, E, NR by VG at 1/3/2019  9:35 AM    Comment:  Educated on HEP and correct mechanics for safe functional mobility. Reinforcement needed d/t impaired safety awareness.    Acceptance, E,D, NR by  at 1/1/2019  2:22 PM   Family Acceptance, E, NR by VG at 1/3/2019  9:35 AM    Comment:  Educated on HEP and correct mechanics for safe functional mobility. Reinforcement needed d/t impaired safety awareness.   Significant Other Acceptance, E,D, NR by  at 1/1/2019  2:22 PM                   Point: Precautions (In Progress)     Learning Progress Summary           Patient Acceptance, E, NR by VG at 1/3/2019  9:35 AM    Comment:  Educated on HEP and correct mechanics for safe functional mobility. Reinforcement needed d/t impaired safety awareness.    Acceptance, E,D, NR by  at 1/1/2019  2:22 PM   Family Acceptance, E, NR by VG at 1/3/2019  9:35 AM    Comment:  Educated on HEP and correct mechanics for safe functional  mobility. Reinforcement needed d/t impaired safety awareness.   Significant Other Acceptance, E,D, NR by  at 1/1/2019  2:22 PM                               User Key     Initials Effective Dates Name Provider Type Discipline     06/19/15 -  Reta Iqbal, PT Physical Therapist PT     05/29/18 -  Maribeth Lynn PT Physical Therapist PT                PT Recommendation and Plan  Anticipated Discharge Disposition (PT): inpatient rehabilitation facility  Therapy Frequency (PT Clinical Impression): daily  Outcome Summary/Treatment Plan (PT)  Daily Summary of Progress (PT): progress toward functional goals is good  Anticipated Discharge Disposition (PT): inpatient rehabilitation facility  Plan of Care Reviewed With: patient, family  Progress: improving  Outcome Summary: Pt with improved endurance/participation this date. Supine to sitting EOB with MODA x 2 at trunk. STS x 2 with MARLYN x 2 and RW. Amb 10 ft from bed to recliner with MARLYN x 2 and RW. Limited by fatigue, BLE weakness, and impaired endurance. Inc PT freq to daily d/t improved participation. Will continue to progress pt as able per POC.  Outcome Measures     Row Name 01/03/19 0935 01/01/19 1325          How much help from another person do you currently need...    Turning from your back to your side while in flat bed without using bedrails?  3  -VG  1  -SJ     Moving from lying on back to sitting on the side of a flat bed without bedrails?  2  -VG  1  -SJ     Moving to and from a bed to a chair (including a wheelchair)?  2  -VG  1  -SJ     Standing up from a chair using your arms (e.g., wheelchair, bedside chair)?  2  -VG  1  -SJ     Climbing 3-5 steps with a railing?  1  -VG  1  -SJ     To walk in hospital room?  2  -VG  1  -SJ     AM-PAC 6 Clicks Score  12  -VG  6  -SJ        Functional Assessment    Outcome Measure Options  AM-PAC 6 Clicks Basic Mobility (PT)  -VG  AM-PAC 6 Clicks Basic Mobility (PT)  -SJ       User Key  (r) = Recorded By, (t) =  Taken By, (c) = Cosigned By    Initials Name Provider Type    SJ Reta Iqbal, PT Physical Therapist    VG Maribeth Lynn, PT Physical Therapist         Time Calculation:   PT Charges     Row Name 01/03/19 0935             Time Calculation    Start Time  0935  -VG      PT Received On  01/03/19  -VG      PT Goal Re-Cert Due Date  01/11/19  -VG         Time Calculation- PT    Total Timed Code Minutes- PT  40 minute(s)  -VG         Timed Charges    93790 - Gait Training Minutes   40  -VG        User Key  (r) = Recorded By, (t) = Taken By, (c) = Cosigned By    Initials Name Provider Type    VG Maribeth Lynn, PT Physical Therapist        Therapy Suggested Charges     Code   Minutes Charges    50185 (CPT®) Hc Pt Neuromusc Re Education Ea 15 Min      50498 (CPT®) Hc Pt Ther Proc Ea 15 Min      76695 (CPT®) Hc Gait Training Ea 15 Min 40 3    54800 (CPT®) Hc Pt Therapeutic Act Ea 15 Min      70988 (CPT®) Hc Pt Manual Therapy Ea 15 Min      76866 (CPT®) Hc Pt Iontophoresis Ea 15 Min      33733 (CPT®) Hc Pt Elec Stim Ea-Per 15 Min      76065 (CPT®) Hc Pt Ultrasound Ea 15 Min      60937 (CPT®) Hc Pt Self Care/Mgmt/Train Ea 15 Min      01103 (CPT®) Hc Pt Prosthetic (S) Train Initial Encounter, Each 15 Min      86579 (CPT®) Hc Pt Orthotic(S)/Prosthetic(S) Encounter, Each 15 Min      69133 (CPT®) Hc Orthotic(S) Mgmt/Train Initial Encounter, Each 15min      Total  40 3        Therapy Charges for Today     Code Description Service Date Service Provider Modifiers Qty    85653245192 HC GAIT TRAINING EA 15 MIN 1/3/2019 Maribeth Lynn, PT GP 3          PT G-Codes  Outcome Measure Options: AM-PAC 6 Clicks Basic Mobility (PT)  AM-PAC 6 Clicks Score: 12    Ciera Lynn, PT  1/3/2019

## 2019-01-03 NOTE — PROGRESS NOTES
Continued Stay Note  [facility]     Patient Name: Todd Phillips  MRN: [id number]  Today's Date: 1/3/2019    Admit Date: 12/31/2018    Discharge Plan     Row Name 01/03/19 1513       Plan    Plan  Home with  vs SNF    Patient/Family in Agreement with Plan  yes    Plan Comments  Met with patient and wife at bedside to update discharge plan. Patient worked with PT today and walked 10'. Tube feeds through GJ tube. Goal remains to return home with [facility]. He was current for skilled nursing (change port needle/dressing weekly) with [facility] prior to admission. CM spoke with Georgina at [facility] (P: [phone]; F: [phone]) to confirm. She states new orders will be needed at D/C. Depending on how patient progresses over next few days, family will determine whether he will go home with home health vs [facility]. CM will continue to follow. Tamara Luna RN [phone]        Discharge Codes    No documentation.       Expected Discharge Date and Time     Expected Discharge Date Expected Discharge Time    Jan 5, 2019             Tamara Luna RN

## 2019-01-03 NOTE — PROGRESS NOTES
Patient's spouse called me to let me know that patient had to be put on oxygen at 4 liters and he O2 sats were ranging from 94-96%. She also said that patient went into [facility] and they gave him some medicine. Spouse is concerned and feels like patient needs a chest x-ray. I encouraged spouse to push nurse call bell and let patient's nurse, Reta, aware of patient's complaints. I also called Reta, patient's RN, and discussed spouse's concern. Reta says that she has been in contact with Dr. Vega. AG

## 2019-01-03 NOTE — PLAN OF CARE
Problem: Patient Care Overview  Goal: Plan of Care Review  Outcome: Ongoing (interventions implemented as appropriate)   01/03/19 5996   OTHER   Outcome Summary Pt appears to have slept throughout the night with two doses of diluadid to treat symptoms of pain. Pt currently alert and responding verbally to questions and following commands. Tube feed has been increased to 30mL/h, appears to be tolerating well with no residual noted this shift. MADDY drain output 200mL, remains bloody. VSS, maintaining in NSR on room air.   Coping/Psychosocial   Plan of Care Reviewed With patient   Plan of Care Review   Progress improving       Problem: Skin Injury Risk (Adult)  Goal: Skin Health and Integrity  Outcome: Ongoing (interventions implemented as appropriate)

## 2019-01-03 NOTE — PLAN OF CARE
Problem: Patient Care Overview  Goal: Plan of Care Review   01/03/19 0909   OTHER   Outcome Summary Pt with improved endurance/participation this date. Supine to sitting EOB with MODA x 2 at trunk. STS x 2 with MARLYN x 2 and RW. Amb 10 ft from bed to recliner with MARLYN x 2 and RW. Limited by fatigue, BLE weakness, and impaired endurance. Inc PT freq to daily d/t improved participation. Will continue to progress pt as able per POC.   Coping/Psychosocial   Plan of Care Reviewed With patient;family   Plan of Care Review   Progress improving

## 2019-01-03 NOTE — PROGRESS NOTES
"Patient Name:  Todd Phillips  YOB: 1950  [id number]    Surgery Progress Note    Date of visit: 1/3/2019    Subjective   Subjective: Improved today, much more awake and interactive.  Oriented.  Reports moderate abdominal pain, denies nausea.  Tolerating TFs.  Up in chair for 5 hrs yesterday.          Objective     Objective:     /92 (BP Location: Right arm, Patient Position: Lying)   Pulse 97   Temp 99.2 °F (37.3 °C) (Axillary)   Resp 18   Ht 182.9 cm (72\")   Wt 91.5 kg (201 lb 12.8 oz)   SpO2 94%   BMI 27.37 kg/m²     Intake/Output Summary (Last 24 hours) at 1/3/2019 0734  Last data filed at 1/3/2019 0610  Gross per 24 hour   Intake 816 ml   Output 680 ml   Net 136 ml   MADDY = 200cc overnight    Gen:  More awake, no distress   CV:  Rhythm regular and rate regular  L:  Clear to auscultation bilaterally  Abd:  Non distended, soft, appropriately tender, woundclean and dry, MADDY with serosanguinous output, GJ tube intact with TFs running   Ext:  No cyanosis, clubbing, edema  Neuro:  No focal deficits, GCS 15 today    Labs that are back at this time have been reviewed.          Assessment/Plan     Assessment/ Plan:    67 yo man with pancreatic malignancy of uncinate process, POD 3 s/p Whipple      - overall improved today, much more alert  - continue pain and nausea control as needed. Avoid narcotics    - incentive spirometry and out of bed/ambulate   - IV abx day 3/3  - increase TFs/PO as tolerated         Hospital Problem List     * (Principal) Malignant neoplasm of head of pancreas (CMS/HCC)            Hyperlipidemia (Chronic)        COPD (chronic obstructive pulmonary disease) (CMS/HCC) (Chronic)        Essential hypertension (Chronic)        Gastroesophageal reflux disease without esophagitis (Chronic)    DM2 (diabetes mellitus, type 2) (CMS/[facility]) (Chronic)        Paroxysmal atrial fibrillation (Chronic)    Chronic anticoagulation, Eliquis (Chronic)    Post-operative confusion and somnolence, " likely due to oversedation                  Callie E Dowdy, MD  1/3/2019  7:34 AM

## 2019-01-04 ENCOUNTER — DOCUMENTATION (OUTPATIENT)
Dept: OTHER | Facility: HOSPITAL | Age: 69
End: 2019-01-04

## 2019-01-04 LAB
ANION GAP SERPL CALCULATED.3IONS-SCNC: 7 MMOL/L (ref 3–11)
BUN BLD-MCNC: 29 MG/DL (ref 9–23)
BUN/CREAT SERPL: 25.9 (ref 7–25)
CALCIUM SPEC-SCNC: 8.5 MG/DL (ref 8.7–10.4)
CHLORIDE SERPL-SCNC: 100 MMOL/L (ref 99–109)
CO2 SERPL-SCNC: 26 MMOL/L (ref 20–31)
CREAT BLD-MCNC: 1.12 MG/DL (ref 0.6–1.3)
DEPRECATED RDW RBC AUTO: 54.6 FL (ref 37–54)
ERYTHROCYTE [DISTWIDTH] IN BLOOD BY AUTOMATED COUNT: 16.4 % (ref 11.3–14.5)
GFR SERPL CREATININE-BSD FRML MDRD: 65 ML/MIN/1.73
GLUCOSE BLD-MCNC: 174 MG/DL (ref 70–100)
GLUCOSE BLDC GLUCOMTR-MCNC: 165 MG/DL (ref 70–130)
GLUCOSE BLDC GLUCOMTR-MCNC: 167 MG/DL (ref 70–130)
GLUCOSE BLDC GLUCOMTR-MCNC: 168 MG/DL (ref 70–130)
GLUCOSE BLDC GLUCOMTR-MCNC: 174 MG/DL (ref 70–130)
GLUCOSE BLDC GLUCOMTR-MCNC: 177 MG/DL (ref 70–130)
GLUCOSE BLDC GLUCOMTR-MCNC: 178 MG/DL (ref 70–130)
HCT VFR BLD AUTO: 27 % (ref 38.9–50.9)
HGB BLD-MCNC: 8.8 G/DL (ref 13.1–17.5)
MAGNESIUM SERPL-MCNC: 1.8 MG/DL (ref 1.3–2.7)
MCH RBC QN AUTO: 29.7 PG (ref 27–31)
MCHC RBC AUTO-ENTMCNC: 32.6 G/DL (ref 32–36)
MCV RBC AUTO: 91.2 FL (ref 80–99)
PLATELET # BLD AUTO: 228 10*3/MM3 (ref 150–450)
PMV BLD AUTO: 9.7 FL (ref 6–12)
POTASSIUM BLD-SCNC: 3.3 MMOL/L (ref 3.5–5.5)
RBC # BLD AUTO: 2.96 10*6/MM3 (ref 4.2–5.76)
SODIUM BLD-SCNC: 133 MMOL/L (ref 132–146)
WBC NRBC COR # BLD: 11.09 10*3/MM3 (ref 3.5–10.8)

## 2019-01-04 PROCEDURE — 25010000002 PIPERACILLIN SOD-TAZOBACTAM PER 1 G: Performed by: SURGERY

## 2019-01-04 PROCEDURE — 97166 OT EVAL MOD COMPLEX 45 MIN: CPT

## 2019-01-04 PROCEDURE — 25010000002 HYDROMORPHONE 1 MG/ML SOLUTION: Performed by: SURGERY

## 2019-01-04 PROCEDURE — 82962 GLUCOSE BLOOD TEST: CPT

## 2019-01-04 PROCEDURE — 25010000002 ENOXAPARIN PER 10 MG: Performed by: SURGERY

## 2019-01-04 PROCEDURE — 25010000002 MORPHINE PER 10 MG: Performed by: SURGERY

## 2019-01-04 PROCEDURE — 85027 COMPLETE CBC AUTOMATED: CPT | Performed by: SURGERY

## 2019-01-04 PROCEDURE — 94640 AIRWAY INHALATION TREATMENT: CPT

## 2019-01-04 PROCEDURE — 99233 SBSQ HOSP IP/OBS HIGH 50: CPT | Performed by: HOSPITALIST

## 2019-01-04 PROCEDURE — 94799 UNLISTED PULMONARY SVC/PX: CPT

## 2019-01-04 PROCEDURE — 80048 BASIC METABOLIC PNL TOTAL CA: CPT | Performed by: HOSPITALIST

## 2019-01-04 PROCEDURE — 63710000001 PROCHLORPERAZINE MALEATE PER 5 MG: Performed by: SURGERY

## 2019-01-04 PROCEDURE — 97110 THERAPEUTIC EXERCISES: CPT

## 2019-01-04 PROCEDURE — 94760 N-INVAS EAR/PLS OXIMETRY 1: CPT

## 2019-01-04 PROCEDURE — 97116 GAIT TRAINING THERAPY: CPT

## 2019-01-04 PROCEDURE — 83735 ASSAY OF MAGNESIUM: CPT | Performed by: HOSPITALIST

## 2019-01-04 RX ORDER — LORAZEPAM 0.5 MG/1
0.5 TABLET ORAL EVERY 6 HOURS PRN
Status: DISCONTINUED | OUTPATIENT
Start: 2019-01-04 | End: 2019-01-05

## 2019-01-04 RX ORDER — ACETAMINOPHEN 500 MG
500 TABLET ORAL EVERY 4 HOURS PRN
Status: DISCONTINUED | OUTPATIENT
Start: 2019-01-04 | End: 2019-01-15 | Stop reason: HOSPADM

## 2019-01-04 RX ORDER — MORPHINE SULFATE 30 MG/1
30 TABLET, FILM COATED, EXTENDED RELEASE ORAL EVERY 12 HOURS SCHEDULED
Status: DISCONTINUED | OUTPATIENT
Start: 2019-01-04 | End: 2019-01-05

## 2019-01-04 RX ADMIN — POTASSIUM CHLORIDE 40 MEQ: 1.5 POWDER, FOR SOLUTION ORAL at 20:35

## 2019-01-04 RX ADMIN — DOCUSATE SODIUM AND SENNOSIDES 2 TABLET: 8.6; 5 TABLET, FILM COATED ORAL at 20:35

## 2019-01-04 RX ADMIN — MONTELUKAST SODIUM 10 MG: 10 TABLET, FILM COATED ORAL at 20:34

## 2019-01-04 RX ADMIN — ACETAMINOPHEN 1000 MG: 650 SOLUTION ORAL at 12:05

## 2019-01-04 RX ADMIN — HYDROMORPHONE HYDROCHLORIDE 1 MG: 1 INJECTION, SOLUTION INTRAMUSCULAR; INTRAVENOUS; SUBCUTANEOUS at 12:57

## 2019-01-04 RX ADMIN — MAGNESIUM SULFATE HEPTAHYDRATE 4 G: 40 INJECTION, SOLUTION INTRAVENOUS at 16:43

## 2019-01-04 RX ADMIN — POTASSIUM CHLORIDE 40 MEQ: 1.5 POWDER, FOR SOLUTION ORAL at 16:43

## 2019-01-04 RX ADMIN — FLECAINIDE ACETATE 50 MG: 50 TABLET ORAL at 20:35

## 2019-01-04 RX ADMIN — SCOPALAMINE 1 PATCH: 1 PATCH, EXTENDED RELEASE TRANSDERMAL at 08:27

## 2019-01-04 RX ADMIN — TAZOBACTAM SODIUM AND PIPERACILLIN SODIUM 3.38 G: 375; 3 INJECTION, SOLUTION INTRAVENOUS at 16:42

## 2019-01-04 RX ADMIN — HYDROMORPHONE HYDROCHLORIDE 1 MG: 1 INJECTION, SOLUTION INTRAMUSCULAR; INTRAVENOUS; SUBCUTANEOUS at 08:25

## 2019-01-04 RX ADMIN — INSULIN LISPRO 2 UNITS: 100 INJECTION, SOLUTION INTRAVENOUS; SUBCUTANEOUS at 22:01

## 2019-01-04 RX ADMIN — INSULIN LISPRO 2 UNITS: 100 INJECTION, SOLUTION INTRAVENOUS; SUBCUTANEOUS at 08:22

## 2019-01-04 RX ADMIN — ENOXAPARIN SODIUM 40 MG: 40 INJECTION, SOLUTION INTRAVENOUS; SUBCUTANEOUS at 08:24

## 2019-01-04 RX ADMIN — BUDESONIDE 0.5 MG: 0.5 INHALANT RESPIRATORY (INHALATION) at 08:28

## 2019-01-04 RX ADMIN — METOPROLOL TARTRATE 25 MG: 25 TABLET ORAL at 20:35

## 2019-01-04 RX ADMIN — TAZOBACTAM SODIUM AND PIPERACILLIN SODIUM 3.38 G: 375; 3 INJECTION, SOLUTION INTRAVENOUS at 08:25

## 2019-01-04 RX ADMIN — INSULIN LISPRO 2 UNITS: 100 INJECTION, SOLUTION INTRAVENOUS; SUBCUTANEOUS at 12:05

## 2019-01-04 RX ADMIN — HYDROMORPHONE HYDROCHLORIDE 1 MG: 1 INJECTION, SOLUTION INTRAMUSCULAR; INTRAVENOUS; SUBCUTANEOUS at 04:15

## 2019-01-04 RX ADMIN — MORPHINE SULFATE 2 MG: 2 INJECTION, SOLUTION INTRAMUSCULAR; INTRAVENOUS at 03:36

## 2019-01-04 RX ADMIN — APIXABAN 5 MG: 5 TABLET, FILM COATED ORAL at 16:42

## 2019-01-04 RX ADMIN — BUDESONIDE 0.5 MG: 0.5 INHALANT RESPIRATORY (INHALATION) at 20:19

## 2019-01-04 RX ADMIN — APIXABAN 5 MG: 5 TABLET, FILM COATED ORAL at 20:34

## 2019-01-04 RX ADMIN — FLECAINIDE ACETATE 50 MG: 50 TABLET ORAL at 08:26

## 2019-01-04 RX ADMIN — METOPROLOL TARTRATE 25 MG: 25 TABLET ORAL at 08:25

## 2019-01-04 RX ADMIN — COLCHICINE 0.6 MG: 0.6 TABLET, FILM COATED ORAL at 08:25

## 2019-01-04 RX ADMIN — FLUOXETINE HYDROCHLORIDE 20 MG: 20 CAPSULE ORAL at 20:34

## 2019-01-04 RX ADMIN — MORPHINE SULFATE 2 MG: 2 INJECTION, SOLUTION INTRAMUSCULAR; INTRAVENOUS at 16:57

## 2019-01-04 RX ADMIN — MORPHINE SULFATE 2 MG: 2 INJECTION, SOLUTION INTRAMUSCULAR; INTRAVENOUS at 10:22

## 2019-01-04 RX ADMIN — MORPHINE SULFATE 2 MG: 2 INJECTION, SOLUTION INTRAMUSCULAR; INTRAVENOUS at 23:50

## 2019-01-04 RX ADMIN — Medication 1 PACKET: at 20:36

## 2019-01-04 RX ADMIN — ALLOPURINOL 100 MG: 100 TABLET ORAL at 20:34

## 2019-01-04 RX ADMIN — PREGABALIN 75 MG: 75 CAPSULE ORAL at 08:25

## 2019-01-04 RX ADMIN — Medication 1 PACKET: at 08:27

## 2019-01-04 RX ADMIN — HYDROMORPHONE HYDROCHLORIDE 1 MG: 1 INJECTION, SOLUTION INTRAMUSCULAR; INTRAVENOUS; SUBCUTANEOUS at 21:12

## 2019-01-04 RX ADMIN — PANTOPRAZOLE SODIUM 40 MG: 40 TABLET, DELAYED RELEASE ORAL at 04:15

## 2019-01-04 RX ADMIN — FLUOXETINE HYDROCHLORIDE 20 MG: 20 CAPSULE ORAL at 08:25

## 2019-01-04 RX ADMIN — PROCHLORPERAZINE MALEATE 5 MG: 5 TABLET, FILM COATED ORAL at 02:23

## 2019-01-04 RX ADMIN — MORPHINE SULFATE 30 MG: 30 TABLET, EXTENDED RELEASE ORAL at 20:34

## 2019-01-04 RX ADMIN — MORPHINE SULFATE 30 MG: 30 TABLET, EXTENDED RELEASE ORAL at 12:58

## 2019-01-04 NOTE — THERAPY EVALUATION
Acute Care - Occupational Therapy Initial Evaluation  [facility]     Patient Name: Todd Phillips  : 1950  MRN: [id number]  Today's Date: 2019  Onset of Illness/Injury or Date of Surgery: 18  Date of Referral to OT: 19  Referring Physician: Ankush    Admit Date: 2018       ICD-10-CM ICD-9-CM   1. Impaired functional mobility, balance, gait, and endurance Z74.09 V49.89   2. Laboratory test Z01.89 V72.60   3. Pancreatic cancer (CMS/HCC) C25.9 157.9   4. 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)   • DM2 (diabetes mellitus, type 2) (CMS/HCC)   • Paroxysmal atrial fibrillation   • Chronic anticoagulation, Eliquis   • Encounter for monitoring flecainide therapy   • Malignant neoplasm of head of pancreas (CMS/HCC)   • Bacteremia due to Escherichia coli   • Biliary obstruction   • Thrombocytopenia (CMS/HCC)   • Anemia due to chemotherapy   • Fever   • Post-operative confusion and somnolence, likely due to oversedation   • Atrial fibrillation with RVR (CMS/HCC)     Past Medical History:   Diagnosis Date   • Antral gastritis    • Asthma    • Atrial fibrillation (CMS/HCC)     treated with oral blood thinner   • Chest pain    • COPD (chronic obstructive pulmonary disease) (CMS/HCC)    • Coronary artery disease     no stents   • Diabetes mellitus (CMS/HCC)     type 2 - checks sugar 4 times per day    • Duodenitis    • Elevated cholesterol    • Emphysema of lung (CMS/HCC)    • Gallbladder disease     removed    • GERD (gastroesophageal reflux disease)    • Hard to intubate     busted lip last time   • Hyperlipidemia    •  Hypertension    • Jaundice    • Kidney stone    • Kidney stones     had lithotripsy and passed 36 kidney stones as well as had one surgically removed.   • Malignant neoplasm of head of pancreas (CMS/HCC) 9/5/2018   • Nausea    • Obstructive sleep apnea on CPAP     compliant with machine    • Palpitations    • Pneumonia 08/2018   • Reflux esophagitis    • Renal failure     stage 3 kidney disease   • Sepsis (CMS/HCC)      Past Surgical History:   Procedure Laterality Date   • BILE DUCT STENT PLACEMENT      [facility] 2 weeks ago    • CARDIAC CATHETERIZATION  11/01/1999   • CARDIOVASCULAR STRESS TEST  09/2012   • CHOLECYSTECTOMY N/A 8/10/2018    Procedure: CHOLECYSTECTOMY LAPAROSCOPIC;  Surgeon: Ronak Downs MD;  [facility];  Service: General   • COLONOSCOPY     • CYSTOSCOPY BLADDER STONE LITHOTRIPSY     • ECHO - CONVERTED  09/2012   • ERCP N/A 8/14/2018    Procedure: ENDOSCOPIC RETROGRADE CHOLANGIOPANCREATOGRAPHY WITH PAPILLOTOMY;  Surgeon: Scot Su MD;  [facility];  Service: Gastroenterology   • ERCP N/A 10/1/2018    Procedure: ENDOSCOPIC RETROGRADE CHOLANGIOPANCREATOGRAPHY;  Surgeon: Jefry Luna MD;  Location:  [facility];  Service: Gastroenterology   • KIDNEY STONE SURGERY     • KIDNEY STONE SURGERY      open abdominal surgery   • PORTACATH PLACEMENT Right 10/23/2018    Procedure: INSERTION OF PORTACATH;  Surgeon: Ronak Downs MD;  [facility];  Service: General   • TONSILLECTOMY     • UPPER GASTROINTESTINAL ENDOSCOPY  08/30/2012   • WHIPPLE PROCEDURE N/A 12/31/2018    Procedure: WHIPPLE PROCEDURE, BIOPSY OF LIVER, PORTAL VEIN RESECTION AND REPAIR, G/J TUBE PLACEMENT;  Surgeon: Miquel Brody MD;  Location:  [facility];  Service: General          OT ASSESSMENT FLOWSHEET (last 72 hours)      Occupational Therapy Evaluation     Row Name 01/04/19 9622                   OT Evaluation Time/Intention    Subjective Information   complains of;weakness;pain  -TB        Document Type  evaluation  -TB        Mode of Treatment  occupational therapy  -TB        Patient Effort  good  -TB        Symptoms Noted During/After Treatment  fatigue  -TB           General Information    Patient Profile Reviewed?  yes  -TB        Onset of Illness/Injury or Date of Surgery  12/31/18  -TB        Referring Physician  Lechuga  -TB        Patient Observations  cooperative;lethargic  -TB        Patient/Family Observations  Spouse and dtr at bedside and providing history  -TB        General Observations of Patient  Pt in bed, O2 per NC, MADDY drain, feeding tube, IV; exit alarms  -TB        Prior Level of Function  independent:;all household mobility;transfer;bed mobility;ADL's  -TB        Equipment Currently Used at Home  walker, rolling;wheelchair  -TB        Pertinent History of Current Functional Problem  Pt with dx pancreatic cancer s/p whipple, liver biopsy, portal vein resection/repair and GJ tube placement  -TB        Existing Precautions/Restrictions  fall;other (see comments) ostomy, feeding tube, drain, abd incision  -TB        Limitations/Impairments  safety/cognitive  -TB        Risks Reviewed  patient and family:;LOB;increased discomfort;increased drainage;lines disloged  -TB        Benefits Reviewed  patient and family:;improve function;increase strength;decrease pain;increase knowledge  -TB        Barriers to Rehab  medically complex  -TB           Relationship/Environment    Primary Source of Support/Comfort  spouse;child(cuauhtemoc)  -TB        Lives With  spouse  -TB        Family Caregiver if Needed  spouse;child(cuauhtemoc), adult  -TB        Concerns About Impact on Relationships  Spouse available 24/7, children available prn  -TB           Resource/Environmental Concerns    Current Living Arrangements  home/apartment/condo  -TB           Cognitive Assessment/Intervention- PT/OT    Affect/Mental Status (Cognitive)  flat/blunted affect  -TB        Orientation  Status (Cognition)  oriented to;person;place;situation  -TB        Follows Commands (Cognition)  follows one step commands;75-90% accuracy;verbal cues/prompting required;repetition of directions required;physical/tactile prompts required  -TB        Attention Deficit (Cognitive)  requires cues/redirection to task  -TB        Safety Deficit (Cognitive)  insight into deficits/self awareness;awareness of need for assistance;ability to follow commands  -TB        Personal Safety Interventions  fall prevention program maintained;gait belt;nonskid shoes/slippers when out of bed exit alarms  -TB           Safety Issues, Functional Mobility    Safety Issues Affecting Function (Mobility)  ability to follow commands;insight into deficits/self awareness;awareness of need for assistance;sequencing abilities  -TB        Impairments Affecting Function (Mobility)  cognition;balance;pain;strength  -TB           Bed Mobility Assessment/Treatment    Bed Mobility Assessment/Treatment  rolling left;sidelying-sit;scooting/bridging  -TB        Rolling Left [place] (Bed Mobility)  moderate assist (50% patient effort);verbal cues  -TB        Scooting/Bridging [place] (Bed Mobility)  moderate assist (50% patient effort);verbal cues  -TB        Sidelying-Sit [place] (Bed Mobility)  moderate assist (50% patient effort);verbal cues  -TB        Bed Mobility, Safety Issues  decreased use of arms for pushing/pulling;decreased use of legs for bridging/pushing  -TB        Assistive Device (Bed Mobility)  bed rails;draw sheet;head of bed elevated  -TB           Functional Mobility    Functional Mobility- Ind. Level  minimum assist (75% patient effort);verbal cues required  -TB        Functional Mobility- Device  rolling walker  -TB        Functional Mobility- Safety Issues  step length decreased;sequencing ability decreased;supplemental O2  -TB        Functional Mobility- Comment  assist to manage RW  -TB           Transfer  Assessment/Treatment    Transfer Assessment/Treatment  sit-stand transfer;stand-sit transfer  -TB        Comment (Transfers)  cues for hand placement, sequencing, safety  -TB           Sit-Stand Transfer    Sit-Stand [place] (Transfers)  minimum assist (75% patient effort);verbal cues  -TB        Assistive Device (Sit-Stand Transfers)  walker, front-wheeled  -TB           Stand-Sit Transfer    Stand-Sit [place] (Transfers)  minimum assist (75% patient effort);verbal cues  -TB        Assistive Device (Stand-Sit Transfers)  walker, front-wheeled  -TB           ADL Assessment/Intervention    BADL Assessment/Intervention  upper body dressing;lower body dressing;feeding  -TB           Upper Body Dressing Assessment/Training    Upper Body Dressing [place] Level  don;pajama/robe;moderate assist (50% patient effort);verbal cues  -TB        Upper Body Dressing Position  edge of bed sitting  -TB        Comment (Upper Body Dressing)  assist for multiple lines  -TB           Lower Body Dressing Assessment/Training    Lower Body Dressing [place] Level  don;socks;dependent (less than 25% patient effort);maximum assist (25% patient effort);pants/bottoms pt would benefit from AE - TBA in treatment  -TB        Comment (Lower Body Dressing)  assist to thread LEs into undergarment, pt able to pull up with assist  -TB           Self-Feeding Assessment/Training    [place] Level (Feeding)  dependent (less than 25% patient effort)  -TB        Comment (Feeding)  tube feedings  -TB           Toileting Assessment/Training    Comment (Toileting)  incontinence  -TB           BADL Safety/Performance    Impairments, BADL Safety/Performance  cognition;balance;endurance/activity tolerance;pain;strength  -TB        Cognitive Impairments, BADL Safety/Performance  attention;insight into deficits/self awareness;awareness, need for assistance;sequencing abilities  -TB           General ROM    GENERAL ROM COMMENTS  B FIONA PEREZ  WFL  -TB           MMT (Manual Muscle Testing)    General MMT Comments  B UE functionally 3+/4  -TB           Motor Assessment/Interventions    Additional Documentation  Balance (Group);Therapeutic Exercise (Group)  -TB           Therapeutic Exercise    Therapeutic Exercise  supine, upper extremities  -TB        Additional Documentation  Therapeutic Exercise (Row)  -TB           Upper Extremity Supine Therapeutic Exercise    Performed, Supine Upper Extremity (Therapeutic Exercise)  shoulder flexion/extension;shoulder abduction/adduction;shoulder external/internal rotation;shoulder horizontal abduction/adduction;elbow flexion/extension wrist f/e, hand   -TB        Exercise Type, Supine Upper Extremity (Therapeutic Exercise)  AAROM (active assistive range of motion)  -TB        Expected Outcomes, Supine Upper Extremity (Therapeutic Exercise)  improve performance, transfer skills;improve performance, BADLs;improve functional tolerance, self-care activity  -TB        Sets/Reps Detail, Supine Upper Extremity (Therapeutic Exercise)  10  -TB        Comment, Supine Upper Extremity (Therapeutic Exercise)  Education initiated for benefits of OOB activity/therapy, role of OT, HEP and transfer training; recommend IRF at d/c for best outcome.  -TB           Balance    Balance  dynamic sitting balance;dynamic standing balance;static sitting balance;static standing balance  -TB           Static Sitting Balance    Level of [place] (Unsupported Sitting, Static Balance)  standby assist  -TB        Sitting Position (Unsupported Sitting, Static Balance)  sitting on edge of bed  -TB        Time Able to Maintain Position (Unsupported Sitting, Static Balance)  3 to 4 minutes  -TB        Comment (Unsupported Sitting, Static Balance)  ADL tasks  -TB           Dynamic Sitting Balance    Level of [place], Reaches Outside Midline (Sitting, Dynamic Balance)  contact guard assist  -TB           Static Standing Balance    Level of  [place] (Supported Standing, Static Balance)  contact guard assist  -TB        Assistive Device Utilized (Supported Standing, Static Balance)  rolling walker  -TB           Dynamic Standing Balance    Level of [place], Reaches Outside Midline (Standing, Dynamic Balance)  minimal assist, 75% patient effort  -TB        Time Able to Maintain Position, Reaches Outside Midline (Standing, Dynamic Balance)  30 to 45 seconds  -TB        Comment, Reaches Outside Midline (Standing, Dynamic Balance)  RW level ADL tasks/LB dressing  -TB           Sensory Assessment/Intervention    Sensory General Assessment  no sensation deficits identified BUE intact  -TB           Positioning and Restraints    Pre-Treatment Position  in bed  -TB        Post Treatment Position  chair  -TB        In Chair  reclined;call light within reach;encouraged to call for assist;exit alarm on;with family/caregiver;with PT;legs elevated  -TB           Pain Assessment    Additional Documentation  Pain Scale: Word Pre/Post-Treatment (Group)  -TB           Pain Scale: Numbers Pre/Post-Treatment    Pain Location - Side  Bilateral  -TB        Pain Location  abdomen  -TB        Pain Intervention(s)  Ambulation/increased activity;Repositioned  -TB           Pain Scale: Word Pre/Post-Treatment    Pain: Word Scale, Pretreatment  4 - moderate pain  -TB        Pain: Word Scale, Post-Treatment  4 - moderate pain  -TB        Pre/Post Treatment Pain Comment  pt tolerates OOB activity  -TB           Coping    Observed Emotional State  accepting  -TB        Verbalized Emotional State  acceptance  -TB           Plan of Care Review    Plan of Care Reviewed With  patient;family  -TB           Clinical Impression (OT)    Date of Referral to OT  01/03/19  -TB        OT Diagnosis  Impaired mobility and ADL  -TB        Patient/Family Goals Statement (OT Eval)  spouse would like pt to d/c home with HH  -TB        Criteria for Skilled Therapeutic Interventions Met (OT  Eval)  yes;treatment indicated  -TB        Rehab Potential (OT Eval)  fair, will monitor progress closely  -TB        Therapy Frequency (OT Eval)  daily  -TB        Care Plan Review (OT)  evaluation/treatment results reviewed;care plan/treatment goals reviewed;risks/benefits reviewed;patient/other agree to care plan  -TB        Care Plan Review, Other Participant (OT Eval)  family  -TB        Anticipated Equipment Needs at Discharge (OT)  -- Defer to rehab  -TB        Anticipated Discharge Disposition (OT)  inpatient rehabilitation facility  -TB           Vital Signs    Pre Systolic BP Rehab  -- RN cleared OT  -TB        O2 Delivery Post Treatment  supplemental O2  -TB        Pre Patient Position  Supine  -TB        Intra Patient Position  Standing  -TB        Post Patient Position  Sitting  -TB           Planned OT Interventions    Planned Therapy Interventions (OT Eval)  transfer/mobility retraining;strengthening exercise;occupation/activity based interventions;BADL retraining;adaptive equipment training  -TB           OT Goals    Transfer Goal Selection (OT)  transfer, OT goal 1  -TB        Dressing Goal Selection (OT)  dressing, OT goal 1  -TB        Strength Goal Selection (OT)  strength, OT goal 1  -TB        Functional Mobility Goal Selection (OT)  functional mobility, OT goal 1  -TB           Transfer Goal 1 (OT)    Activity/Assistive Device (Transfer Goal 1, OT)  toilet;walker, rolling to BR  -TB        [place] Level/Cues Needed (Transfer Goal 1, OT)  minimum assist (75% or more patient effort);verbal cues required  -TB        Time Frame (Transfer Goal 1, OT)  by discharge  -TB        Barriers (Transfers Goal 1, OT)  strength, occupational endurance  -TB        Progress/Outcome (Transfer Goal 1, OT)  goal ongoing  -TB           Dressing Goal 1 (OT)    Activity/Assistive Device (Dressing Goal 1, OT)  lower body dressing;reacher;long handled shoe horn;sock-aid Pt would benefit from AE and teaching  -TB         [place]/Cues Needed (Dressing Goal 1, OT)  moderate assist (50-74% patient effort);verbal cues required;tactile cues required  -TB        Time Frame (Dressing Goal 1, OT)  by discharge  -TB        Barriers (Dressing Goal 1, OT)  strength, occupational endurance  -TB        Progress/Outcome (Dressing Goal 1, OT)  goal ongoing  -TB           Strength Goal 1 (OT)    Strength Goal 1 (OT)  Pt participates in daily seated HEP 3x10-12 reps AAROM, progress to AROM to support ADLs  -TB        Time Frame (Strength Goal 1, OT)  by discharge  -TB        Barriers (Strength Goal 1, OT)  strength, occupational endurance  -TB        Progress/Outcome (Strength Goal 1, OT)  goal ongoing  -TB           Functional Mobility Goal 1 (OT)    Activity/Assistive Device (Functional Mobility Goal 1, OT)  walker, rolling  -TB        [place] Level/Cues Needed (Functional Mobility Goal 1, OT)  minimum assist (75% or more patient effort);verbal cues required  -TB        Distance Goal 1 (Functional Mobility, OT)  to BR; household distances  -TB        Time Frame (Functional Mobility Goal 1, OT)  by discharge  -TB        Barriers (Functional Mobility Goal 1, OT)  occupational endurance  -TB        Progress/Outcome (Functional Mobility Goal 1, OT)  goal ongoing  -TB           Living Environment    Home Accessibility  stairs to enter home  -TB          User Key  (r) = Recorded By, (t) = Taken By, (c) = Cosigned By    Initials Name Effective Dates    Corina Terrell, LORENA 06/08/18 -          Occupational Therapy Education     Title: PT OT SLP Therapies (In Progress)     Topic: Occupational Therapy (In Progress)     Point: ADL training (Done)     Description: Instruct learner(s) on proper safety adaptation and remediation techniques during self care or transfers.   Instruct in proper use of assistive devices.    Learning Progress Summary           Patient Acceptance, E,D,TB, VU,NR by TB at 1/4/2019  3:55 PM    Comment:  Education  initiated for benefits of OOB activity/therapy, role of OT, transfer training, HEP and recommendation for rehab at d/c   Family Acceptance, E,D,TB, VU,NR by TB at 1/4/2019  3:55 PM    Comment:  Education initiated for benefits of OOB activity/therapy, role of OT, transfer training, HEP and recommendation for rehab at d/c                   Point: Home exercise program (Done)     Description: Instruct learner(s) on appropriate technique for monitoring, assisting and/or progressing therapeutic exercises/activities.    Learning Progress Summary           Patient Acceptance, E,D,TB, VU,NR by TB at 1/4/2019  3:55 PM    Comment:  Education initiated for benefits of OOB activity/therapy, role of OT, transfer training, HEP and recommendation for rehab at d/c   Family Acceptance, E,D,TB, VU,NR by TB at 1/4/2019  3:55 PM    Comment:  Education initiated for benefits of OOB activity/therapy, role of OT, transfer training, HEP and recommendation for rehab at d/c                               User Key     Initials Effective Dates Name Provider Type Discipline     06/08/18 -  Corina Nelson, LORENA Occupational Therapist OT                  OT Recommendation and Plan  Outcome Summary/Treatment Plan (OT)  Anticipated Equipment Needs at Discharge (OT): (Defer to rehab)  Anticipated Discharge Disposition (OT): inpatient rehabilitation facility  Planned Therapy Interventions (OT Eval): transfer/mobility retraining, strengthening exercise, occupation/activity based interventions, BADL retraining, adaptive equipment training  Therapy Frequency (OT Eval): daily  Plan of Care Review  Plan of Care Reviewed With: patient, family  Plan of Care Reviewed With: patient, family  Outcome Summary: OT IE completed. Pt presents with low arousal, decreased strength and poor occupational endurance. Mod A bed mobiltity. Min A STS and in room ambulation with +1 assist for equipment.  Mod A UB dressing. Max A LB dressing. HEP initiated. Pt would  benefit from rehab for best outcome. OT to follow.    Outcome Measures     Row Name 01/04/19 1504 01/04/19 1450 01/03/19 0935       How much help from another person do you currently need...    Turning from your back to your side while in flat bed without using bedrails?  3  -VG  --  3  -VG    Moving from lying on back to sitting on the side of a flat bed without bedrails?  3  -VG  --  2  -VG    Moving to and from a bed to a chair (including a wheelchair)?  2  -VG  --  2  -VG    Standing up from a chair using your arms (e.g., wheelchair, bedside chair)?  2  -VG  --  2  -VG    Climbing 3-5 steps with a railing?  1  -VG  --  1  -VG    To walk in hospital room?  2  -VG  --  2  -VG    AM-PAC 6 Clicks Score  13  -VG  --  12  -VG       How much help from another is currently needed...    Putting on and taking off regular lower body clothing?  --  1  -TB  --    Bathing (including washing, rinsing, and drying)  --  2  -TB  --    Toileting (which includes using toilet bed pan or urinal)  --  2  -TB  --    Putting on and taking off regular upper body clothing  --  2  -TB  --    Taking care of personal grooming (such as brushing teeth)  --  2  -TB  --    Eating meals  --  1  -TB  --    Score  --  10  -TB  --       Functional Assessment    Outcome Measure Options  AM-PAC 6 Clicks Basic Mobility (PT)  -VG  AM-PAC 6 Clicks Daily Activity (OT)  -TB  AM-PAC 6 Clicks Basic Mobility (PT)  -VG      User Key  (r) = Recorded By, (t) = Taken By, (c) = Cosigned By    Initials Name Provider Type    TB Corina Nelson, OT Occupational Therapist    VG Maribeth Lynn, PT Physical Therapist          Time Calculation:   Time Calculation- OT     Row Name 01/04/19 1559 01/04/19 1504          Time Calculation- OT    OT Start Time  1450  -TB  --     OT Received On  01/04/19  -TB  --     OT Goal Re-Cert Due Date  01/14/19  -TB  --        Timed Charges    89707 - Gait Training Minutes   --  13  -VG       User Key  (r) = Recorded By, (t) =  Taken By, (c) = Cosigned By    Initials Name Provider Type    TB Corina Nelson, OT Occupational Therapist    Maribeth Campa, PT Physical Therapist        Therapy Suggested Charges     Code   Minutes Charges    None           Therapy Charges for Today     Code Description Service Date Service Provider Modifiers Qty    [id number] HC OT EVAL MOD COMPLEXITY 4 1/4/2019 Corina Nelson OT GO 1               Corina Nelson OT  1/4/2019

## 2019-01-04 NOTE — PROGRESS NOTES
Continued Stay Note  [facility]     Patient Name: Todd Phillips  MRN: [id number]  Today's Date: 1/4/2019    Admit Date: 12/31/2018    Discharge Plan     Row Name 01/04/19 1233       Plan    Plan  home with     Patient/Family in Agreement with Plan  yes    Plan Comments  Spoke with patient and family at bedside regarding discharge plan.  Both indicate that patient plans to return home with [facility] in [facility].  No needs noted at this time.  CM following  Patient plan is to discharge home with [facility] via car with family to transport when medically ready.     Final Discharge Disposition Code  06 - home with home health care        Discharge Codes    No documentation.       Expected Discharge Date and Time     Expected Discharge Date Expected Discharge Time    Jan 5, 2019             Aisha Quispe RN

## 2019-01-04 NOTE — PROGRESS NOTES
I went to see patient in the hospital. Patient was sitting up in his chair and spouse was giving him a bath. Patient's incision looks very clean, dry and intact at this time with no redness or drainage noted. Spouse made a comment on how good his incision looks. Patient is still a little drowsy. Spouse said that patient walked in halls today. Patient is off of his oxygen at this time with his sats ranging from 91-93%. AG

## 2019-01-04 NOTE — PLAN OF CARE
Problem: Patient Care Overview  Goal: Plan of Care Review  Outcome: Ongoing (interventions implemented as appropriate)   01/04/19 2281   OTHER   Outcome Summary OT IE completed. Pt presents with low arousal, decreased strength and poor occupational endurance. Mod A bed mobiltity. Min A STS and in room ambulation with +1 assist for equipment. Mod A UB dressing. Max A LB dressing. HEP initiated. Pt would benefit from rehab for best outcome. OT to follow.   Coping/Psychosocial   Plan of Care Reviewed With patient;family

## 2019-01-04 NOTE — PROGRESS NOTES
[facility] Medicine Services  PROGRESS NOTE    Patient Name: Todd Phillips  : 1950  MRN: [id number]    Date of Admission: 2018  Length of Stay: 4  Primary Care Physician: Adiel Denney MD    Subjective   Subjective     CC:  F/U pancreatic cancer s/p Whipple    HPI:  Patient seen this morning. Complains of abdominal pain. Breathing better and denies chest pain. In normal sinus rhythm on the monitor.     Review of Systems  CV-no chest pain, no palpitations  Resp-no cough, improved dyspnea  GI-no N/V/D, +abd pain    Otherwise ROS is negative except as mentioned in the HPI.    Objective   Objective     Vital Signs:   Temp:  [97.3 °F (36.3 °C)-100.4 °F (38 °C)] 97.3 °F (36.3 °C)  Heart Rate:  [] 85  Resp:  [16-18] 18  BP: (114-149)/(76-87) 149/86        Physical Exam:  Gen-no acute distress  HENT-NCAT, mucous membranes moist  CV-RRR, S1 S2 normal, no m/r/g  Resp-CTAB, no wheezes or rales, nonlabored  Abd-soft, mild to moderate TTP, ND, +BS  Ext-no edema  Neuro-A&Ox3, no focal deficits  Skin-no rashes  Psych-appropriate mood      Results Reviewed:  I have personally reviewed current lab, radiology, and data and agree.    Results from last 7 days   Lab Units  19   0737  19   0639  19   0609  19   0542   WBC 10*3/mm3  11.09*  8.19  9.27  12.54*   HEMOGLOBIN g/dL  8.8*  8.1*  8.2*  9.9*   HEMATOCRIT %  27.0*  24.8*  24.8*  30.1*   PLATELETS 10*3/mm3  228  227  202  232   INR    --    --    --   1.09     Results from last 7 days   Lab Units  19   0738  19   0639  19   0609   18   1343   SODIUM mmol/L  133  139  138   < >  130*   POTASSIUM mmol/L  3.3*  3.4*  3.5   < >  5.3   CHLORIDE mmol/L  100  107  106   < >  102   CO2 mmol/L  26.0  24.0  23.0   < >  21.0   BUN mg/dL  29*  25*  22   < >  23   CREATININE mg/dL  1.12  0.95  0.92   < >  1.20   GLUCOSE mg/dL  174*  207*  195*   < >  239*   CALCIUM mg/dL  8.5*  8.0*  7.8*   <  >  8.4*   ALT (SGPT) U/L   --   21   --    --   28   AST (SGOT) U/L   --   15   --    --   47*    < > = values in this interval not displayed.     Estimated Creatinine Clearance: 79.4 mL/min (by C-G formula based on SCr of 1.12 mg/dL).    No results found for: BNP    Microbiology Results Abnormal     None          Imaging Results (last 24 hours)     Procedure Component Value Units Date/Time    XR Chest 1 View [[id number]] Collected:  01/03/19 1653     Updated:  01/03/19 1656    Narrative:          EXAMINATION: XR CHEST 1 VW-      INDICATION: dyspnea; Z74.09-Other reduced mobility; Z01.89-Encounter for  other specified special examinations; C25.9-Malignant neoplasm of  pancreas, unspecified      COMPARISON: 11/17/2018     FINDINGS: Right-sided central venous access port is seen with its tip in  the expected location of the mid-distal SVC. Heart is borderline  enlarged. Vasculature appears normal. Lungs are moderately well-expanded  and appear clear except for trace interstitial disease in the lung bases  stable from prior exam. No edema effusion or pneumothorax is seen.           Impression:       No new chest disease.     This report was finalized on 1/3/2019 4:54 PM by DR. Darrell Remy MD.             Results for orders placed during the hospital encounter of 10/22/17   Adult Transthoracic Echo Complete W/ Cont if Necessary Per Protocol    Narrative · Left ventricular systolic function is normal. Estimated EF = 60%.  · The cardiac valves are anatomically and functionally normal.          I have reviewed the medications:    Current Facility-Administered Medications:   •  acetaminophen (TYLENOL) 160 MG/5ML solution 1,000 mg, 1,000 mg, Oral, Q6H, Miquel Brody MD, 1,000 mg at 01/04/19 1205  •  albuterol (PROVENTIL) nebulizer solution 0.083% 2.5 mg/3mL, 2.5 mg, Nebulization, Q4H PRN, Miquel Brody MD  •  albuterol (PROVENTIL) nebulizer solution 0.083% 2.5 mg/3mL, 2.5 mg, Nebulization, BID PRN,  Miquel Brody MD  •  allopurinol (ZYLOPRIM) tablet 100 mg, 100 mg, Oral, Nightly, Miquel Brody MD, 100 mg at 01/03/19 2129  •  budesonide (PULMICORT) nebulizer solution 0.5 mg, 0.5 mg, Nebulization, BID - RT, Miquel Brody MD, 0.5 mg at 01/04/19 0828  •  colchicine tablet 0.6 mg, 0.6 mg, Oral, Daily, Miquel Brody MD, 0.6 mg at 01/04/19 0825  •  enoxaparin (LOVENOX) syringe 40 mg, 40 mg, Subcutaneous, Q24H, Miquel Brody MD, 40 mg at 01/04/19 0824  •  flecainide (TAMBOCOR) tablet 50 mg, 50 mg, Oral, Q12H, Miquel Brody MD, 50 mg at 01/04/19 0826  •  FLUoxetine (PROzac) capsule 20 mg, 20 mg, Oral, BID, Miquel Brody MD, 20 mg at 01/04/19 0825  •  HYDROmorphone (DILAUDID) injection 1 mg, 1 mg, Intravenous, Q2H PRN, Miquel Brody MD, 1 mg at 01/04/19 0825  •  insulin lispro (humaLOG) injection 0-9 Units, 0-9 Units, Subcutaneous, 4x Daily With Meals & Nightly, Miquel Brody MD, 2 Units at 01/04/19 1205  •  Magnesium Sulfate 2 gram Bolus, followed by 8 gram infusion (total Mg dose 10 grams)- Mg less than or equal to 1mg/dL, 2 g, Intravenous, PRN **OR** Magnesium Sulfate 2 gram / 50mL Infusion (GIVE X 3 BAGS TO EQUAL 6GM TOTAL DOSE) - Mg 1.1 - 1.5 mg/dl, 2 g, Intravenous, PRN **OR** Magnesium Sulfate 4 gram infusion- Mg 1.6-1.9 mg/dL, 4 g, Intravenous, PRN, Andreina Beltran MD, Last Rate: 25 mL/hr at 01/01/19 1651, 4 g at 01/01/19 1651  •  metoprolol tartrate (LOPRESSOR) injection 5 mg, 5 mg, Intravenous, Q5 Min PRN, Marjorie Lechuga MD, 5 mg at 01/03/19 1125  •  metoprolol tartrate (LOPRESSOR) tablet 25 mg, 25 mg, Oral, Q12H, Marjorie Lechuga MD, 25 mg at 01/04/19 0825  •  montelukast (SINGULAIR) tablet 10 mg, 10 mg, Oral, Nightly, Miquel Brody MD, 10 mg at 01/03/19 2129  •  Morphine (MS CONTIN) 12 hr tablet 30 mg, 30 mg, Oral, Q12H, Miquel Brody MD  •  morphine injection 2 mg, 2 mg, Intravenous, Q4H  PRN, Miquel Brody MD, 2 mg at 01/04/19 1022  •  naloxone (NARCAN) injection 0.1 mg, 0.1 mg, Intravenous, Q5 Min PRN, Miquel Brody MD  •  nitroglycerin (NITROSTAT) SL tablet 0.4 mg, 0.4 mg, Sublingual, PRN, Miquel Brody MD  •  ondansetron (ZOFRAN) injection 4 mg, 4 mg, Intravenous, Q6H PRN, Miquel Brody MD, 4 mg at 01/03/19 2354  •  oxyCODONE (ROXICODONE) 5 MG/5ML solution 10 mg, 10 mg, Per G Tube, Q4H PRN, Miquel Brody MD  •  oxyCODONE (ROXICODONE) 5 MG/5ML solution 7.5 mg, 7.5 mg, Per G Tube, Q4H PRN, Miquel Brody MD  •  pantoprazole (PROTONIX) EC tablet 40 mg, 40 mg, Oral, QAM, Miquel Brody MD, 40 mg at 01/04/19 0415  •  piperacillin-tazobactam (ZOSYN) 3.375 g in iso-osmotic dextrose 50 ml (premix), 3.375 g, Intravenous, Q8H, Miquel Brody MD, 3.375 g at 01/04/19 0825  •  potassium chloride (MICRO-K) CR capsule 40 mEq, 40 mEq, Oral, PRN **OR** potassium chloride (KLOR-CON) packet 40 mEq, 40 mEq, Oral, PRN **OR** potassium chloride 10 mEq in 100 mL IVPB, 10 mEq, Intravenous, Q1H PRN, Marjorie Lechuga MD  •  pregabalin (LYRICA) capsule 75 mg, 75 mg, Oral, Daily, Miquel Brody MD, 75 mg at 01/04/19 0825  •  PRO-STAT 1 packet, 1 packet, Per J Tube, BID, Reta Gamble RD, 1 packet at 01/04/19 0827  •  prochlorperazine (COMPAZINE) injection 5 mg, 5 mg, Intravenous, Q6H PRN **OR** prochlorperazine (COMPAZINE) tablet 5 mg, 5 mg, Oral, Q6H PRN, 5 mg at 01/04/19 0223 **OR** prochlorperazine (COMPAZINE) suppository 25 mg, 25 mg, Rectal, Q12H PRN, Miquel Brody MD  •  Scopolamine (TRANSDERM-SCOP) 1.5 MG/3DAYS patch 1 patch, 1 patch, Transdermal, Q72H, Miquel Brody MD, 1 patch at 01/04/19 0849  •  sennosides-docusate sodium (SENOKOT-S) 8.6-50 MG tablet 2 tablet, 2 tablet, Oral, Nightly, Miquel Brody MD, Stopped at 01/03/19 9432    Facility-Administered Medications Ordered in Other  Encounters:   •  sodium chloride 0.9 % infusion  - ADS Override Pull, , , ,   •  sodium chloride 0.9 % infusion  - ADS Override Pull, , , ,       Assessment/Plan   Assessment / Plan     Active Hospital Problems    Diagnosis Date Noted   • **Malignant neoplasm of head of pancreas (CMS/[facility]) [C25.0] 09/05/2018     Added automatically from request for surgery [id number]     • Atrial fibrillation with RVR (CMS/[facility]) [I48.91] 01/03/2019   • Post-operative confusion and somnolence, likely due to oversedation [R41.0] 01/02/2019   • Chronic anticoagulation, Eliquis [Z79.01] 10/27/2017   • Paroxysmal atrial fibrillation [I48.91] 10/22/2017   • DM2 (diabetes mellitus, type 2) (CMS/[facility]) [E11.9] 10/22/2017   • Gastroesophageal reflux disease without esophagitis [K21.9] 02/09/2017   • COPD (chronic obstructive pulmonary disease) (CMS/[facility]) [J44.9] 06/16/2016   • Hyperlipidemia [E78.5] 06/16/2016   • Essential hypertension [I10] 06/16/2016          Brief Hospital Course to date:  Todd Phillips is a 68 y.o. male with hx of pancreatic cancer, COPD, diabetes mellitus type 2, PAF, GERD, essential hypertension, and hyperlipidemia who was admitted by Dr. Brody 12/31/18 and underwent planned Whipple for locally advanced pancreatic cancer. Hospitalists consulted post-operatively for medical management.    PLAN:  --Post-op confusion and somnolence likely due to oversedation and narcotics. PCA turned off. Continue PRN morphine and Dilaudid. Resolved.  --Dr. Brody following. Started on enteral feeds 1/2. Now on clear liquids as well.  --Went into Afib with RVR with chest pain 1/3 following PT session. Started on Metoprolol 25 mg BID. Has now converted to NSR. Continue Flecainide. Resume Eliquis today.  --Check UA due to leukocytosis on labs today. CXR done yesterday was negative. Remains on Zosyn.  --Continue PT/OT.    DVT Prophylaxis:  Lovenox    Disposition: I expect the patient to be discharged TBD    CODE STATUS:   Code Status and  Medical Interventions:   Ordered at: 12/31/18 1604     Level Of Support Discussed With:    Patient     Code Status:    CPR     Medical Interventions (Level of Support Prior to Arrest):    Full         Electronically signed by Marjorie Lechuga MD, 01/04/19, 2:01 PM.

## 2019-01-04 NOTE — PROGRESS NOTES
I went to see patient in the hospital yesterday. Patient was complaining of shoulder pain. I had family push nurse call button to call patient's nurse. Patient did not have an incentive spirometer(IS) in his room, so I went and got him one from the clean supply room. I educated patient/family on the importance of IS use 10 times every hour while awake and they verbalized understanding. Patient's nurse brought in some pain meds for pain. I encouraged patient to walk today and talked to patient's nurse about the importance of walking. Patient's spouse said that patient sat in a chair yesterday for 5 hours, but only walked a few steps from bed to chair and later from chair to bed. I let patient's nurse that I got patient a IS and educated patient on its use and patient demonstrated back on how to use. AG

## 2019-01-04 NOTE — PROGRESS NOTES
"Patient Name:  Todd Phillips  YOB: 1950  [id number]    Surgery Progress Note    Date of visit: 1/4/2019    Subjective   Subjective: Mental status continues to improve, more alert today.  Reports \"aching all over.\"  Was up in chair yesterday and worked with PT.  Tolerated some jello.  A fib yesterday after getting out of bed, now on metoprolol and controlled.           Objective     Objective:     /76   Pulse 82   Temp 97.6 °F (36.4 °C) (Axillary)   Resp 16   Ht 182.9 cm (72\")   Wt 91.5 kg (201 lb 12.8 oz)   SpO2 96%   BMI 27.37 kg/m²     Intake/Output Summary (Last 24 hours) at 1/4/2019 0728  Last data filed at 1/3/2019 1802  Gross per 24 hour   Intake 80 ml   Output 140 ml   Net -60 ml   MADDY = 80cc output yesterday    Gen:  Uncomfortable but no distress  CV:  Rhythm regular and rate regular  L:  Slightly coarse bilaterally  Abd:  Non distended, appropriately tender, wounds clean dry and intact with staples in place, GJ with TF running, MADDY with serosanguinous output  Ext:  No cyanosis, clubbing, edema  Neuro:  GCS 15    Labs that are back at this time have been reviewed.    WBC normal yesterday      Assessment/Plan     Assessment/ Plan:    69 yo man with pancreatic malignancy of uncinate process, POD 4 s/p Whipple      - overall mental status continues to improve, more alert  - continue pain and nausea control as needed. Avoid narcotics    - incentive spirometry and out of bed/ambulate/PT   - increase TFs/PO as tolerated   - continue to monitor drain output           Hospital Problem List     * (Principal) Malignant neoplasm of head of pancreas (CMS/HCC)    Overview Signed 9/5/2018  9:58 AM by Olya Vaughn     Added automatically from request for surgery [id number]         Hyperlipidemia (Chronic)        COPD (chronic obstructive pulmonary disease) (CMS/[facility]) (Chronic)        Essential hypertension (Chronic)        Gastroesophageal reflux disease without esophagitis (Chronic)    DM2 " (diabetes mellitus, type 2) (CMS/HCC) (Chronic)        Paroxysmal atrial fibrillation (Chronic)    Chronic anticoagulation, Eliquis (Chronic)    Post-operative confusion and somnolence, likely due to oversedation        Atrial fibrillation with RVR (CMS/HCC)              Nisha Ackerman MD  1/4/2019  7:28 AM

## 2019-01-04 NOTE — PLAN OF CARE
Problem: Patient Care Overview  Goal: Plan of Care Review  Outcome: Ongoing (interventions implemented as appropriate)   01/04/19 0309   OTHER   Outcome Summary Had difficult day with PT going into a .fib RVR. Very lethargic and in pain/ nauseous. Instructed to chew gum and incentive spirometer. Stood up from side of bed once, limited by pain/fatigue. Controlling pain/nausea with medications. No further fevers this evening. Continuing to monitor. Continuing POC.    Coping/Psychosocial   Plan of Care Reviewed With patient;spouse   Plan of Care Review   Progress improving       Problem: Skin Injury Risk (Adult)  Goal: Skin Health and Integrity  Outcome: Ongoing (interventions implemented as appropriate)      Problem: Pain, Acute (Adult)  Goal: Acceptable Pain Control/Comfort Level  Outcome: Ongoing (interventions implemented as appropriate)

## 2019-01-04 NOTE — THERAPY TREATMENT NOTE
Acute Care - Physical Therapy Treatment Note  [facility]     Patient Name: Todd Phillips  : 1950  MRN: [id number]  Today's Date: 2019  Onset of Illness/Injury or Date of Surgery: 18  Date of Referral to PT: 18  Referring Physician: Mary Grace    Admit Date: 2018    Visit Dx:    ICD-10-CM ICD-9-CM   1. Impaired functional mobility, balance, gait, and endurance Z74.09 V49.89   2. Laboratory test Z01.89 V72.60   3. Pancreatic cancer (CMS/HCC) C25.9 157.9   4. Impaired mobility and ADLs Z74.09 799.89     Patient Active Problem List   Diagnosis   • Hyperlipidemia   • COPD (chronic obstructive pulmonary disease) (CMS/HCC)   • Essential hypertension   • Gout without tophus   • Anxiety and depression   • Primary insomnia   • Gastroesophageal reflux disease without esophagitis   • Nonobstructive atherosclerosis of coronary artery   • CKD stage 3 secondary to diabetes (CMS/[facility])   • DM2 (diabetes mellitus, type 2) (CMS/[facility])   • Paroxysmal atrial fibrillation   • Chronic anticoagulation, Eliquis   • Encounter for monitoring flecainide therapy   • Malignant neoplasm of head of pancreas (CMS/HCC)   • Bacteremia due to Escherichia coli   • Biliary obstruction   • Thrombocytopenia (CMS/HCC)   • Anemia due to chemotherapy   • Fever   • Post-operative confusion and somnolence, likely due to oversedation   • Atrial fibrillation with RVR (CMS/HCC)       Therapy Treatment    Rehabilitation Treatment Summary     Row Name 19 1504             Treatment Time/Intention    Discipline  physical therapist  -VG      Document Type  therapy note (daily note)  -VG      Subjective Information  complains of;weakness;fatigue  -VG      Mode of Treatment  individual therapy;physical therapy  -VG      Patient/Family Observations  In bed, supplemental O2, tele, ostomy, tube feeding, IV, subclav port, R drain; family present.  -VG      Care Plan Review  patient/other agree to care plan  -VG      Care Plan Review, Other  Participant(s)  family  -VG      Total Minutes, Physical Therapy Treatment  23  -VG      Therapy Frequency (PT Clinical Impression)  daily  -VG      Patient Effort  good  -VG      Existing Precautions/Restrictions  fall;oxygen therapy device and L/min;other (see comments) ostomy, feeding tube, abd inc, subclav port, R drain  -VG      Recorded by [VG] Maribeth Lynn, PT 01/04/19 1534      Row Name 01/04/19 1504             Vital Signs    Post SpO2 (%)  95  -VG      O2 Delivery Post Treatment  supplemental O2 2LO2NC  -VG      Post Patient Position  Sitting s/p ambulation  -VG      Recorded by [VG] Maribeth Lynn, PT 01/04/19 1534      Row Name 01/04/19 1504             Cognitive Assessment/Intervention- PT/OT    Affect/Mental Status (Cognitive)  flat/blunted affect;confused  -VG      Orientation Status (Cognition)  oriented to;person  -VG      Follows Commands (Cognition)  follows one step commands;50-74% accuracy;physical/tactile prompts required;repetition of directions required;verbal cues/prompting required  -VG      Cognitive Function (Cognitive)  attention deficit;executive function deficit;memory deficit;safety deficit  -VG      Attention Deficit (Cognitive)  mild deficit;requires cues/redirection to task  -VG      Executive Function Deficit (Cognition)  moderate deficit;initiation  -VG      Memory Deficit (Cognitive)  moderate deficit;working memory  -VG      Safety Deficit (Cognitive)  moderate deficit;insight into deficits/self awareness  -VG      Personal Safety Interventions  fall prevention program maintained;gait belt;nonskid shoes/slippers when out of bed;supervised activity  -VG      Recorded by [VG] Maribeth Lynn, PT 01/04/19 1534      Row Name 01/04/19 1504             Bed Mobility Assessment/Treatment    Bed Mobility Assessment/Treatment  supine-sit  -VG      Supine-Sit [place] (Bed Mobility)  moderate assist (50% patient effort);1 person assist;verbal cues  -VG      Bed Mobility, Safety  Issues  decreased use of arms for pushing/pulling;decreased use of legs for bridging/pushing  -VG      Assistive Device (Bed Mobility)  bed rails;head of bed elevated;draw sheet  -VG      Comment (Bed Mobility)  MODA at trunk for supine to sit. Good sitting balance EOB.  -VG      Recorded by [VG] Maribeth Lynn, PT 01/04/19 1534      Row Name 01/04/19 1504             Transfer Assessment/Treatment    Transfer Assessment/Treatment  sit-stand transfer;stand-sit transfer  -VG      Comment (Transfers)  Cues for hand placement and sequencing. Inc time/effort.  -VG      Recorded by [VG] Maribeth Lynn, PT 01/04/19 1534      Row Name 01/04/19 1504             Sit-Stand Transfer    Sit-Stand [place] (Transfers)  minimum assist (75% patient effort);2 person assist;verbal cues  -VG      Assistive Device (Sit-Stand Transfers)  walker, front-wheeled  -VG      Recorded by [VG] Maribeth Lynn, PT 01/04/19 1534      Row Name 01/04/19 1504             Stand-Sit Transfer    Stand-Sit [place] (Transfers)  minimum assist (75% patient effort);1 person assist;verbal cues  -VG      Assistive Device (Stand-Sit Transfers)  walker, front-wheeled  -VG      Recorded by [VG] Maribeth Lynn, PT 01/04/19 1534      Row Name 01/04/19 1504             Gait/Stairs Assessment/Training    79683 - Gait Training Minutes   13  -VG      Gait/Stairs Assessment/Training  gait/ambulation independence  -VG      [place] Level (Gait)  minimum assist (75% patient effort);1 person assist;1 person to manage equipment;verbal cues  -VG      Assistive Device (Gait)  walker, front-wheeled  -VG      Distance in Feet (Gait)  20  -VG      Pattern (Gait)  step-to;step-through  -VG      Deviations/Abnormal Patterns (Gait)  bobby decreased;festinating/shuffling;gait speed decreased;stride length decreased  -VG      Bilateral Gait Deviations  forward flexed posture  -VG      Comment (Gait/Stairs)  Distance limited by fatigue, BLE weakness, and SOA;  VSS on 2LO2NC. Improved endurance and stability with gait. Cues for AD management.  -VG      Recorded by [VG] Maribeth Lynn, PT 01/04/19 1534      Row Name 01/04/19 1504             Motor Skills Assessment/Interventions    Additional Documentation  Therapeutic Exercise (Group);Therapeutic Exercise Interventions (Group)  -VG      Recorded by [VG] Maribeth Lynn, PT 01/04/19 1534      Row Name 01/04/19 1504             Therapeutic Exercise    43743 - PT Therapeutic Exercise Minutes  10  -VG      Recorded by [VG] Maribeth Lynn, PT 01/04/19 1534      Row Name 01/04/19 1504             Therapeutic Exercise    Lower Extremity (Therapeutic Exercise)  heel slides, bilateral;SLR (straight leg raise), bilateral  -VG      Lower Extremity Range of Motion (Therapeutic Exercise)  hip abduction/adduction, bilateral;ankle dorsiflexion/plantar flexion, bilateral  -VG      Exercise Type (Therapeutic Exercise)  AROM (active range of motion);AAROM (active assistive range of motion)  -VG      Position (Therapeutic Exercise)  seated  -VG      Sets/Reps (Therapeutic Exercise)  10x  -VG      Comment (Therapeutic Exercise)  Cues for technique. Rest breaks required d/t fatigue.  -VG      Recorded by [VG] Maribeth Lynn, PT 01/04/19 1534      Row Name 01/04/19 1504             Positioning and Restraints    Pre-Treatment Position  in bed  -VG      Post Treatment Position  chair  -VG      In Chair  reclined;call light within reach;encouraged to call for assist;exit alarm on;with family/caregiver;waffle cushion;on mechanical lift sling;legs elevated  -VG      Recorded by [VG] Maribeth Lynn, PT 01/04/19 1534      Row Name 01/04/19 1504             Pain Scale: FACES Pre/Post-Treatment    Pain: FACES Scale, Pretreatment  0-->no hurt  -VG      Pain: FACES Scale, Post-Treatment  0-->no hurt  -VG      Recorded by [VG] Maribeth Lynn, PT 01/04/19 1534      Row Name 01/04/19 1504             Coping    Observed Emotional State  accepting   -VG      Verbalized Emotional State  acceptance  -VG      Recorded by [VG] Maribeth Lynn, PT 01/04/19 1534      Row Name 01/04/19 1504             Plan of Care Review    Plan of Care Reviewed With  patient;family  -VG      Recorded by [VG] Maribeth Lynn, PT 01/04/19 1534      Row Name 01/04/19 1504             Outcome Summary/Treatment Plan (PT)    Daily Summary of Progress (PT)  progress toward functional goals is good  -VG      Anticipated Discharge Disposition (PT)  inpatient rehabilitation facility  -VG      Recorded by [VG] Maribeth Lynn, PT 01/04/19 1534        User Key  (r) = Recorded By, (t) = Taken By, (c) = Cosigned By    Initials Name Effective Dates Discipline    VG Maribeth Lynn, PT 05/29/18 -  PT          Wound upper;midline abdomen incision (Active)   Dressing Appearance open to air 1/4/2019  8:24 AM   Closure [place] 1/4/2019  8:24 AM   Base clean;dry 1/4/2019  8:24 AM   Drainage Amount none 1/4/2019  8:24 AM           Physical Therapy Education     Title: PT OT SLP Therapies (In Progress)     Topic: Physical Therapy (In Progress)     Point: Mobility training (In Progress)     Learning Progress Summary           Patient Acceptance, E, NR by VG at 1/4/2019  3:04 PM    Comment:  Educated on HEP and correct mechanics for safe functional mobility. Reinforcement needed d/t current cognitive status.    Acceptance, E, NR by VG at 1/3/2019  9:35 AM    Comment:  Educated on HEP and correct mechanics for safe functional mobility. Reinforcement needed d/t impaired safety awareness.    Acceptance, E,D, NR by SJ at 1/1/2019  2:22 PM   Family Acceptance, E, NR by VG at 1/4/2019  3:04 PM    Comment:  Educated on HEP and correct mechanics for safe functional mobility. Reinforcement needed d/t current cognitive status.    Acceptance, E, NR by VG at 1/3/2019  9:35 AM    Comment:  Educated on HEP and correct mechanics for safe functional mobility. Reinforcement needed d/t impaired safety awareness.    Significant Other Acceptance, E,D, NR by SJ at 1/1/2019  2:22 PM                   Point: Home exercise program (In Progress)     Learning Progress Summary           Patient Acceptance, E, NR by VG at 1/4/2019  3:04 PM    Comment:  Educated on HEP and correct mechanics for safe functional mobility. Reinforcement needed d/t current cognitive status.    Acceptance, E, NR by VG at 1/3/2019  9:35 AM    Comment:  Educated on HEP and correct mechanics for safe functional mobility. Reinforcement needed d/t impaired safety awareness.    Acceptance, E,D, NR by SJ at 1/1/2019  2:22 PM   Family Acceptance, E, NR by VG at 1/4/2019  3:04 PM    Comment:  Educated on HEP and correct mechanics for safe functional mobility. Reinforcement needed d/t current cognitive status.    Acceptance, E, NR by VG at 1/3/2019  9:35 AM    Comment:  Educated on HEP and correct mechanics for safe functional mobility. Reinforcement needed d/t impaired safety awareness.   Significant Other Acceptance, E,D, NR by ILEANA at 1/1/2019  2:22 PM                   Point: Body mechanics (In Progress)     Learning Progress Summary           Patient Acceptance, E, NR by VG at 1/4/2019  3:04 PM    Comment:  Educated on HEP and correct mechanics for safe functional mobility. Reinforcement needed d/t current cognitive status.    Acceptance, E, NR by VG at 1/3/2019  9:35 AM    Comment:  Educated on HEP and correct mechanics for safe functional mobility. Reinforcement needed d/t impaired safety awareness.    Acceptance, E,D, NR by ILEANA at 1/1/2019  2:22 PM   Family Acceptance, E, NR by VG at 1/4/2019  3:04 PM    Comment:  Educated on HEP and correct mechanics for safe functional mobility. Reinforcement needed d/t current cognitive status.    Acceptance, E, NR by VG at 1/3/2019  9:35 AM    Comment:  Educated on HEP and correct mechanics for safe functional mobility. Reinforcement needed d/t impaired safety awareness.   Significant Other Acceptance, E,D, NR by ILEANA at  1/1/2019  2:22 PM                   Point: Precautions (In Progress)     Learning Progress Summary           Patient Acceptance, E, NR by VG at 1/4/2019  3:04 PM    Comment:  Educated on HEP and correct mechanics for safe functional mobility. Reinforcement needed d/t current cognitive status.    Acceptance, E, NR by VG at 1/3/2019  9:35 AM    Comment:  Educated on HEP and correct mechanics for safe functional mobility. Reinforcement needed d/t impaired safety awareness.    Acceptance, E,D, NR by  at 1/1/2019  2:22 PM   Family Acceptance, E, NR by  at 1/4/2019  3:04 PM    Comment:  Educated on HEP and correct mechanics for safe functional mobility. Reinforcement needed d/t current cognitive status.    Acceptance, E, NR by VG at 1/3/2019  9:35 AM    Comment:  Educated on HEP and correct mechanics for safe functional mobility. Reinforcement needed d/t impaired safety awareness.   Significant Other Acceptance, E,D, NR by  at 1/1/2019  2:22 PM                               User Key     Initials Effective Dates Name Provider Type Discipline     06/19/15 -  Reta Iqbal, PT Physical Therapist PT     05/29/18 -  Maribeth Lynn PT Physical Therapist PT                PT Recommendation and Plan  Anticipated Discharge Disposition (PT): inpatient rehabilitation facility  Therapy Frequency (PT Clinical Impression): daily  Outcome Summary/Treatment Plan (PT)  Daily Summary of Progress (PT): progress toward functional goals is good  Anticipated Discharge Disposition (PT): inpatient rehabilitation facility  Plan of Care Reviewed With: patient, family  Progress: improving  Outcome Summary: Pt increased ambulation distance to 20 ft with RW and MARLYN x1 with 2nd person managing equipment. STS with MARLYN x 2. Supine to sit with MODA at trunk. Demonstrates improved endurance/stability with gait, limited by fatigue, BLE weakness, and SOA, VSS on 2LO2NC. Will continue to progress pt as able per POC.  Outcome Measures      Row Name 01/04/19 1504 01/03/19 0935          How much help from another person do you currently need...    Turning from your back to your side while in flat bed without using bedrails?  3  -VG  3  -VG     Moving from lying on back to sitting on the side of a flat bed without bedrails?  3  -VG  2  -VG     Moving to and from a bed to a chair (including a wheelchair)?  2  -VG  2  -VG     Standing up from a chair using your arms (e.g., wheelchair, bedside chair)?  2  -VG  2  -VG     Climbing 3-5 steps with a railing?  1  -VG  1  -VG     To walk in hospital room?  2  -VG  2  -VG     AM-PAC 6 Clicks Score  13  -VG  12  -VG        Functional Assessment    Outcome Measure Options  AM-PAC 6 Clicks Basic Mobility (PT)  -VG  AM-PAC 6 Clicks Basic Mobility (PT)  -VG       User Key  (r) = Recorded By, (t) = Taken By, (c) = Cosigned By    Initials Name Provider Type    VG Maribeth Lynn, PT Physical Therapist         Time Calculation:   PT Charges     Row Name 01/04/19 1504             Time Calculation    Start Time  1504  -VG      PT Received On  01/04/19  -VG      PT Goal Re-Cert Due Date  01/11/19  -VG         Time Calculation- PT    Total Timed Code Minutes- PT  23 minute(s)  -VG         Timed Charges    85238 - PT Therapeutic Exercise Minutes  10  -VG      03145 - Gait Training Minutes   13  -VG        User Key  (r) = Recorded By, (t) = Taken By, (c) = Cosigned By    Initials Name Provider Type    VG Maribeth Lynn, PT Physical Therapist        Therapy Suggested Charges     Code   Minutes Charges    29909 (CPT®) Hc Pt Neuromusc Re Education Ea 15 Min      55763 (CPT®) Hc Pt Ther Proc Ea 15 Min 10 1    21541 (CPT®) Hc Gait Training Ea 15 Min 13 1    13192 (CPT®) Hc Pt Therapeutic Act Ea 15 Min      78846 (CPT®) Hc Pt Manual Therapy Ea 15 Min      51686 (CPT®) Hc Pt Iontophoresis Ea 15 Min      24100 (CPT®) Hc Pt Elec Stim Ea-Per 15 Min      27948 (CPT®) Hc Pt Ultrasound Ea 15 Min      44565 (CPT®) Hc Pt Self  Care/Mgmt/Train Ea 15 Min      47056 (CPT®) Hc Pt Prosthetic (S) Train Initial Encounter, Each 15 Min      54886 (CPT®) Hc Pt Orthotic(S)/Prosthetic(S) Encounter, Each 15 Min      38485 (CPT®) Hc Orthotic(S) Mgmt/Train Initial Encounter, Each 15min      Total  23 2        Therapy Charges for Today     Code Description Service Date Service Provider Modifiers Qty    50674700185 HC GAIT TRAINING EA 15 MIN 1/3/2019 Maribeth Lynn, PT GP 3    70758347634 HC PT THER PROC EA 15 MIN 1/4/2019 Maribeth Lynn, PT GP 1    03557954943 HC GAIT TRAINING EA 15 MIN 1/4/2019 Maribeth Lynn, PT GP 1          PT G-Codes  Outcome Measure Options: AM-PAC 6 Clicks Basic Mobility (PT)  AM-PAC 6 Clicks Score: 13    Ciera Lynn, PT  1/4/2019

## 2019-01-04 NOTE — PLAN OF CARE
Problem: Patient Care Overview  Goal: Plan of Care Review   01/04/19 1504   OTHER   Outcome Summary Pt increased ambulation distance to 20 ft with RW and MARLYN x1 with 2nd person managing equipment. STS with MARLYN x 2. Supine to sit with MODA at trunk. Demonstrates improved endurance/stability with gait, limited by fatigue, BLE weakness, and SOA, VSS on 2LO2NC. Will continue to progress pt as able per POC.   Coping/Psychosocial   Plan of Care Reviewed With patient;family   Plan of Care Review   Progress improving

## 2019-01-04 NOTE — PROGRESS NOTES
Adult Nutrition  Assessment/PES    Patient Name:  Todd Phillips  YOB: 1950  MRN: [id number]  Admit Date:  12/31/2018    Assessment Date:  1/4/2019      Reason for Assessment     Row Name 01/04/19 1415          Reason for Assessment    Reason For Assessment  follow-up protocol;TF/PN 45 mins     Diagnosis  -- per notes this adm             Labs/Tests/Procedures/Meds     Row Name 01/04/19 1432          Labs/Procedures/Meds    Lab Results Reviewed  reviewed   noted low P and K yesterday, and low K today. Noted Mg wnl today. Rec checking serum P today, and replacing K and P as approp.             Nutrition Prescription Ordered     Row Name 01/04/19 1434 01/04/19 1416       Nutrition Prescription PO    Current PO Diet  --  Clear Liquid       Nutrition Prescription EN    Enteral Route  -- G-J tube in place; TF being delivered via Jtube.   --    Product  --  Peptamen 1.5 (Vital 1.5)    Modulars  Liquid Protein (15 gm/30 mL)  --    Liquid Protein (15 gm/30 mL)  30 mL/1 packet  --    Protein Liquid Frequency  2 times a day  --    TF Delivery Method  --  Continuous    Continuous TF Goal Rate (mL/hr)  --  60 mL/hr    Continuous TF Current Rate (mL/hr)  --  30 mL/hr   nsg reports pt had some vomitting yesterday and TF rate ran at 20ml/h yesterday.TF advanced to 30ml/h today and pt is tolerating now (per nsg and per pt) .    Water flush (mL)   20 mL  --    Water Flush Frequency  Per hour  --        Evaluation of Received Nutrient/Fluid Intake     Row Name 01/04/19 1435 01/04/19 1431       PO Evaluation    % PO Intake  --  -- pt states he has taken some of po CL diet today and is tolerating, but has not been consuming CL diet prior to today per nsg notes.        EN Evaluation    Number of Days EN Intake Evaluated  1 day  --    EN Average Volume Delivered (mL/day)  540 mL/day as TF beginning, but TF did not start advancing until today. Pt has not achieved goal volume of 60ml/h yet, so less-than-optimal daily delivery  is expected at this point.   --              Problem/Interventions:  Problem 1     Row Name 01/04/19 1436          Nutrition Diagnoses Problem 1    Problem 1  Needs Alternate Route     Etiology (related to)  MNT for Treatment/Condition     Signs/Symptoms (evidenced by)  Report of Mnimal PO Intake                 Intervention Goal     Row Name 01/04/19 1437          Intervention Goal    General  Nutrition support treatment         Nutrition Intervention     Row Name 01/04/19 1437          Nutrition Intervention    RD/Tech Action  Follow Tx progress;Supplement provided;Encourage intake       Rec Continue same TF protocol, advancing to goal rate as olga.   Nutrition Prescription     Row Name 01/04/19 1438 01/04/19 1437       Nutrition Prescription PO    PO Prescription  --  Begin/change supplement    Supplement  --  Boost Breeze    Supplement Frequency  --  3 times a day    New PO Prescription Ordered?  Yes  --        Education/Evaluation     Row Name 01/04/19 1437          Monitor/Evaluation    Monitor  Per protocol           Electronically signed by:  Hannah Alonso MS,RD,LD  01/04/19 2:38 PM

## 2019-01-05 ENCOUNTER — APPOINTMENT (OUTPATIENT)
Dept: CT IMAGING | Facility: HOSPITAL | Age: 69
End: 2019-01-05

## 2019-01-05 ENCOUNTER — APPOINTMENT (OUTPATIENT)
Dept: GENERAL RADIOLOGY | Facility: HOSPITAL | Age: 69
End: 2019-01-05

## 2019-01-05 LAB
ANION GAP SERPL CALCULATED.3IONS-SCNC: 12 MMOL/L (ref 3–11)
ANION GAP SERPL CALCULATED.3IONS-SCNC: 9 MMOL/L (ref 3–11)
ARTERIAL PATENCY WRIST A: ABNORMAL
ARTERIAL PATENCY WRIST A: ABNORMAL
ATMOSPHERIC PRESS: ABNORMAL MMHG
ATMOSPHERIC PRESS: ABNORMAL MMHG
BACTERIA UR QL AUTO: ABNORMAL /HPF
BASE EXCESS BLDA CALC-SCNC: -2.7 MMOL/L (ref 0–2)
BASE EXCESS BLDA CALC-SCNC: -7.1 MMOL/L (ref 0–2)
BASOPHILS # BLD AUTO: 0.02 10*3/MM3 (ref 0–0.2)
BASOPHILS NFR BLD AUTO: 0.1 % (ref 0–1)
BDY SITE: ABNORMAL
BDY SITE: ABNORMAL
BILIRUB UR QL STRIP: NEGATIVE
BNP SERPL-MCNC: 58 PG/ML (ref 0–100)
BODY TEMPERATURE: 37 C
BODY TEMPERATURE: 37 C
BUN BLD-MCNC: 31 MG/DL (ref 9–23)
BUN BLD-MCNC: 35 MG/DL (ref 9–23)
BUN/CREAT SERPL: 27.4 (ref 7–25)
BUN/CREAT SERPL: 28.7 (ref 7–25)
CALCIUM SPEC-SCNC: 8.1 MG/DL (ref 8.7–10.4)
CALCIUM SPEC-SCNC: 8.2 MG/DL (ref 8.7–10.4)
CHLORIDE SERPL-SCNC: 100 MMOL/L (ref 99–109)
CHLORIDE SERPL-SCNC: 103 MMOL/L (ref 99–109)
CLARITY UR: ABNORMAL
CO2 BLDA-SCNC: 15.7 MMOL/L (ref 23–27)
CO2 BLDA-SCNC: 20.4 MMOL/L (ref 23–27)
CO2 SERPL-SCNC: 16 MMOL/L (ref 20–31)
CO2 SERPL-SCNC: 20 MMOL/L (ref 20–31)
COARSE GRAN CASTS URNS QL MICRO: ABNORMAL /LPF
COHGB MFR BLD: 1.7 % (ref 0–2)
COHGB MFR BLD: 1.8 % (ref 0–2)
COLOR UR: ABNORMAL
CREAT BLD-MCNC: 1.13 MG/DL (ref 0.6–1.3)
CREAT BLD-MCNC: 1.22 MG/DL (ref 0.6–1.3)
D-LACTATE SERPL-SCNC: 2 MMOL/L (ref 0.5–2)
D-LACTATE SERPL-SCNC: 2.8 MMOL/L (ref 0.5–2)
D-LACTATE SERPL-SCNC: 3.8 MMOL/L (ref 0.5–2)
DEPRECATED RDW RBC AUTO: 54.3 FL (ref 37–54)
DEPRECATED RDW RBC AUTO: 54.9 FL (ref 37–54)
EOSINOPHIL # BLD AUTO: 0 10*3/MM3 (ref 0–0.3)
EOSINOPHIL NFR BLD AUTO: 0 % (ref 0–3)
ERYTHROCYTE [DISTWIDTH] IN BLOOD BY AUTOMATED COUNT: 16.6 % (ref 11.3–14.5)
ERYTHROCYTE [DISTWIDTH] IN BLOOD BY AUTOMATED COUNT: 16.6 % (ref 11.3–14.5)
FINE GRAN CASTS URNS QL MICRO: ABNORMAL /LPF
GFR SERPL CREATININE-BSD FRML MDRD: 59 ML/MIN/1.73
GFR SERPL CREATININE-BSD FRML MDRD: 64 ML/MIN/1.73
GLUCOSE BLD-MCNC: 233 MG/DL (ref 70–100)
GLUCOSE BLD-MCNC: 235 MG/DL (ref 70–100)
GLUCOSE BLDC GLUCOMTR-MCNC: 186 MG/DL (ref 70–130)
GLUCOSE BLDC GLUCOMTR-MCNC: 212 MG/DL (ref 70–130)
GLUCOSE BLDC GLUCOMTR-MCNC: 220 MG/DL (ref 70–130)
GLUCOSE BLDC GLUCOMTR-MCNC: 262 MG/DL (ref 70–130)
GLUCOSE BLDC GLUCOMTR-MCNC: 299 MG/DL (ref 70–130)
GLUCOSE BLDC GLUCOMTR-MCNC: 311 MG/DL (ref 70–130)
GLUCOSE UR STRIP-MCNC: NEGATIVE MG/DL
HCO3 BLDA-SCNC: 15.1 MMOL/L (ref 20–26)
HCO3 BLDA-SCNC: 19.6 MMOL/L (ref 20–26)
HCT VFR BLD AUTO: 27.6 % (ref 38.9–50.9)
HCT VFR BLD AUTO: 29.8 % (ref 38.9–50.9)
HCT VFR BLD CALC: 28.2 %
HCT VFR BLD CALC: 30.2 %
HGB BLD-MCNC: 9 G/DL (ref 13.1–17.5)
HGB BLD-MCNC: 9.8 G/DL (ref 13.1–17.5)
HGB BLDA-MCNC: 9.2 G/DL (ref 13.5–17.5)
HGB BLDA-MCNC: 9.8 G/DL (ref 13.5–17.5)
HGB UR QL STRIP.AUTO: ABNORMAL
HOLD SPECIMEN: NORMAL
HOROWITZ INDEX BLD+IHG-RTO: 28 %
HOROWITZ INDEX BLD+IHG-RTO: 28 %
HYALINE CASTS UR QL AUTO: ABNORMAL /LPF
IMM GRANULOCYTES # BLD AUTO: 0.22 10*3/MM3 (ref 0–0.03)
IMM GRANULOCYTES NFR BLD AUTO: 1.5 % (ref 0–0.6)
KETONES UR QL STRIP: ABNORMAL
LEUKOCYTE ESTERASE UR QL STRIP.AUTO: NEGATIVE
LYMPHOCYTES # BLD AUTO: 0.72 10*3/MM3 (ref 0.6–4.8)
LYMPHOCYTES NFR BLD AUTO: 4.8 % (ref 24–44)
MAGNESIUM SERPL-MCNC: 2.3 MG/DL (ref 1.3–2.7)
MCH RBC QN AUTO: 29.7 PG (ref 27–31)
MCH RBC QN AUTO: 29.8 PG (ref 27–31)
MCHC RBC AUTO-ENTMCNC: 32.6 G/DL (ref 32–36)
MCHC RBC AUTO-ENTMCNC: 32.9 G/DL (ref 32–36)
MCV RBC AUTO: 90.6 FL (ref 80–99)
MCV RBC AUTO: 91.1 FL (ref 80–99)
METHGB BLD QL: 0.3 % (ref 0–1.5)
METHGB BLD QL: 0.4 % (ref 0–1.5)
MODALITY: ABNORMAL
MODALITY: ABNORMAL
MONOCYTES # BLD AUTO: 0.86 10*3/MM3 (ref 0–1)
MONOCYTES NFR BLD AUTO: 5.7 % (ref 0–12)
NEUTROPHILS # BLD AUTO: 13.38 10*3/MM3 (ref 1.5–8.3)
NEUTROPHILS NFR BLD AUTO: 89.4 % (ref 41–71)
NITRITE UR QL STRIP: NEGATIVE
NOTE: ABNORMAL
NOTE: ABNORMAL
OXYHGB MFR BLDV: 89.6 % (ref 94–99)
OXYHGB MFR BLDV: 91.4 % (ref 94–99)
PCO2 BLDA: 20.9 MM HG
PCO2 BLDA: 25.2 MM HG
PCO2 TEMP ADJ BLD: 20.9 MM HG (ref 35–48)
PCO2 TEMP ADJ BLD: 25.2 MM HG (ref 35–48)
PH BLDA: 7.47 PH UNITS (ref 7.35–7.45)
PH BLDA: 7.5 PH UNITS (ref 7.35–7.45)
PH UR STRIP.AUTO: 5.5 [PH] (ref 5–8)
PH, TEMP CORRECTED: 7.47 PH UNITS
PH, TEMP CORRECTED: 7.5 PH UNITS
PLATELET # BLD AUTO: 279 10*3/MM3 (ref 150–450)
PLATELET # BLD AUTO: 336 10*3/MM3 (ref 150–450)
PMV BLD AUTO: 10 FL (ref 6–12)
PMV BLD AUTO: 9.5 FL (ref 6–12)
PO2 BLDA: 57.8 MM HG (ref 83–108)
PO2 BLDA: 61.5 MM HG (ref 83–108)
PO2 TEMP ADJ BLD: 57.8 MM HG (ref 83–108)
PO2 TEMP ADJ BLD: 61.5 MM HG (ref 83–108)
POTASSIUM BLD-SCNC: 3.5 MMOL/L (ref 3.5–5.5)
POTASSIUM BLD-SCNC: 3.8 MMOL/L (ref 3.5–5.5)
POTASSIUM BLD-SCNC: 3.8 MMOL/L (ref 3.5–5.5)
PROCALCITONIN SERPL-MCNC: <0.05 NG/ML
PROT UR QL STRIP: ABNORMAL
RBC # BLD AUTO: 3.03 10*6/MM3 (ref 4.2–5.76)
RBC # BLD AUTO: 3.29 10*6/MM3 (ref 4.2–5.76)
RBC # UR: ABNORMAL /HPF
REF LAB TEST METHOD: ABNORMAL
SODIUM BLD-SCNC: 128 MMOL/L (ref 132–146)
SODIUM BLD-SCNC: 132 MMOL/L (ref 132–146)
SP GR UR STRIP: 1.04 (ref 1–1.03)
SQUAMOUS #/AREA URNS HPF: ABNORMAL /HPF
TROPONIN I SERPL-MCNC: 0.02 NG/ML
UROBILINOGEN UR QL STRIP: ABNORMAL
VENTILATOR MODE: ABNORMAL
VENTILATOR MODE: ABNORMAL
WBC NRBC COR # BLD: 14.98 10*3/MM3 (ref 3.5–10.8)
WBC NRBC COR # BLD: 22.42 10*3/MM3 (ref 3.5–10.8)
WBC UR QL AUTO: ABNORMAL /HPF
YEAST URNS QL MICRO: ABNORMAL /HPF

## 2019-01-05 PROCEDURE — 82805 BLOOD GASES W/O2 SATURATION: CPT

## 2019-01-05 PROCEDURE — 94799 UNLISTED PULMONARY SVC/PX: CPT

## 2019-01-05 PROCEDURE — 81001 URINALYSIS AUTO W/SCOPE: CPT | Performed by: NURSE PRACTITIONER

## 2019-01-05 PROCEDURE — 25010000002 PIPERACILLIN SOD-TAZOBACTAM PER 1 G: Performed by: SURGERY

## 2019-01-05 PROCEDURE — 80048 BASIC METABOLIC PNL TOTAL CA: CPT | Performed by: NURSE PRACTITIONER

## 2019-01-05 PROCEDURE — 84484 ASSAY OF TROPONIN QUANT: CPT | Performed by: NURSE PRACTITIONER

## 2019-01-05 PROCEDURE — 25010000002 MEROPENEM

## 2019-01-05 PROCEDURE — 94640 AIRWAY INHALATION TREATMENT: CPT

## 2019-01-05 PROCEDURE — 36600 WITHDRAWAL OF ARTERIAL BLOOD: CPT

## 2019-01-05 PROCEDURE — 93010 ELECTROCARDIOGRAM REPORT: CPT | Performed by: INTERNAL MEDICINE

## 2019-01-05 PROCEDURE — 84145 PROCALCITONIN (PCT): CPT | Performed by: INTERNAL MEDICINE

## 2019-01-05 PROCEDURE — 25010000002 HYDROMORPHONE PER 4 MG: Performed by: INTERNAL MEDICINE

## 2019-01-05 PROCEDURE — 83605 ASSAY OF LACTIC ACID: CPT | Performed by: NURSE PRACTITIONER

## 2019-01-05 PROCEDURE — 74176 CT ABD & PELVIS W/O CONTRAST: CPT

## 2019-01-05 PROCEDURE — 83735 ASSAY OF MAGNESIUM: CPT | Performed by: HOSPITALIST

## 2019-01-05 PROCEDURE — 85027 COMPLETE CBC AUTOMATED: CPT | Performed by: SURGERY

## 2019-01-05 PROCEDURE — 85025 COMPLETE CBC W/AUTO DIFF WBC: CPT | Performed by: NURSE PRACTITIONER

## 2019-01-05 PROCEDURE — 71045 X-RAY EXAM CHEST 1 VIEW: CPT

## 2019-01-05 PROCEDURE — 84132 ASSAY OF SERUM POTASSIUM: CPT | Performed by: HOSPITALIST

## 2019-01-05 PROCEDURE — 87040 BLOOD CULTURE FOR BACTERIA: CPT | Performed by: HOSPITALIST

## 2019-01-05 PROCEDURE — 80048 BASIC METABOLIC PNL TOTAL CA: CPT | Performed by: SURGERY

## 2019-01-05 PROCEDURE — 25010000002 ONDANSETRON PER 1 MG: Performed by: SURGERY

## 2019-01-05 PROCEDURE — 25010000002 VANCOMYCIN 10 G RECONSTITUTED SOLUTION

## 2019-01-05 PROCEDURE — 87040 BLOOD CULTURE FOR BACTERIA: CPT | Performed by: INTERNAL MEDICINE

## 2019-01-05 PROCEDURE — 36415 COLL VENOUS BLD VENIPUNCTURE: CPT | Performed by: INTERNAL MEDICINE

## 2019-01-05 PROCEDURE — 82962 GLUCOSE BLOOD TEST: CPT

## 2019-01-05 PROCEDURE — P9041 ALBUMIN (HUMAN),5%, 50ML: HCPCS | Performed by: SURGERY

## 2019-01-05 PROCEDURE — 25010000002 HEPARIN (PORCINE) PER 1000 UNITS: Performed by: INTERNAL MEDICINE

## 2019-01-05 PROCEDURE — 83880 ASSAY OF NATRIURETIC PEPTIDE: CPT | Performed by: NURSE PRACTITIONER

## 2019-01-05 PROCEDURE — 25010000002 ALBUMIN HUMAN 5% PER 50 ML: Performed by: SURGERY

## 2019-01-05 PROCEDURE — 99233 SBSQ HOSP IP/OBS HIGH 50: CPT | Performed by: HOSPITALIST

## 2019-01-05 PROCEDURE — 93005 ELECTROCARDIOGRAM TRACING: CPT | Performed by: NURSE PRACTITIONER

## 2019-01-05 PROCEDURE — 0 DIATRIZOATE MEGLUMINE & SODIUM PER 1 ML

## 2019-01-05 PROCEDURE — 83605 ASSAY OF LACTIC ACID: CPT | Performed by: SURGERY

## 2019-01-05 PROCEDURE — 94760 N-INVAS EAR/PLS OXIMETRY 1: CPT

## 2019-01-05 PROCEDURE — 99291 CRITICAL CARE FIRST HOUR: CPT | Performed by: INTERNAL MEDICINE

## 2019-01-05 PROCEDURE — 25010000002 MORPHINE PER 10 MG: Performed by: SURGERY

## 2019-01-05 RX ORDER — SODIUM CHLORIDE 9 MG/ML
125 INJECTION, SOLUTION INTRAVENOUS CONTINUOUS
Status: DISCONTINUED | OUTPATIENT
Start: 2019-01-05 | End: 2019-01-05

## 2019-01-05 RX ORDER — IPRATROPIUM BROMIDE AND ALBUTEROL SULFATE 2.5; .5 MG/3ML; MG/3ML
3 SOLUTION RESPIRATORY (INHALATION) ONCE
Status: COMPLETED | OUTPATIENT
Start: 2019-01-05 | End: 2019-01-05

## 2019-01-05 RX ORDER — ALBUMIN, HUMAN INJ 5% 5 %
SOLUTION INTRAVENOUS
Status: DISPENSED
Start: 2019-01-05 | End: 2019-01-05

## 2019-01-05 RX ORDER — SODIUM CHLORIDE, SODIUM LACTATE, POTASSIUM CHLORIDE, CALCIUM CHLORIDE 600; 310; 30; 20 MG/100ML; MG/100ML; MG/100ML; MG/100ML
75 INJECTION, SOLUTION INTRAVENOUS CONTINUOUS
Status: DISCONTINUED | OUTPATIENT
Start: 2019-01-05 | End: 2019-01-07

## 2019-01-05 RX ORDER — IPRATROPIUM BROMIDE AND ALBUTEROL SULFATE 2.5; .5 MG/3ML; MG/3ML
3 SOLUTION RESPIRATORY (INHALATION)
Status: DISCONTINUED | OUTPATIENT
Start: 2019-01-05 | End: 2019-01-13

## 2019-01-05 RX ORDER — VANCOMYCIN HYDROCHLORIDE 1 G/200ML
1000 INJECTION, SOLUTION INTRAVENOUS EVERY 12 HOURS
Status: DISCONTINUED | OUTPATIENT
Start: 2019-01-06 | End: 2019-01-07

## 2019-01-05 RX ORDER — ALBUMIN, HUMAN INJ 5% 5 %
500 SOLUTION INTRAVENOUS ONCE
Status: COMPLETED | OUTPATIENT
Start: 2019-01-05 | End: 2019-01-05

## 2019-01-05 RX ORDER — PANTOPRAZOLE SODIUM 40 MG/10ML
40 INJECTION, POWDER, LYOPHILIZED, FOR SOLUTION INTRAVENOUS
Status: DISCONTINUED | OUTPATIENT
Start: 2019-01-06 | End: 2019-01-15 | Stop reason: HOSPADM

## 2019-01-05 RX ORDER — METOPROLOL TARTRATE 5 MG/5ML
5 INJECTION INTRAVENOUS EVERY 6 HOURS
Status: DISCONTINUED | OUTPATIENT
Start: 2019-01-05 | End: 2019-01-05

## 2019-01-05 RX ORDER — SODIUM CHLORIDE 9 MG/ML
100 INJECTION, SOLUTION INTRAVENOUS CONTINUOUS
Status: ACTIVE | OUTPATIENT
Start: 2019-01-05 | End: 2019-01-05

## 2019-01-05 RX ORDER — HEPARIN SODIUM 5000 [USP'U]/ML
5000 INJECTION, SOLUTION INTRAVENOUS; SUBCUTANEOUS EVERY 8 HOURS SCHEDULED
Status: DISCONTINUED | OUTPATIENT
Start: 2019-01-05 | End: 2019-01-07

## 2019-01-05 RX ORDER — HYDROMORPHONE HYDROCHLORIDE 1 MG/ML
0.5 INJECTION, SOLUTION INTRAMUSCULAR; INTRAVENOUS; SUBCUTANEOUS
Status: DISCONTINUED | OUTPATIENT
Start: 2019-01-05 | End: 2019-01-12

## 2019-01-05 RX ADMIN — INSULIN LISPRO 7 UNITS: 100 INJECTION, SOLUTION INTRAVENOUS; SUBCUTANEOUS at 12:24

## 2019-01-05 RX ADMIN — SODIUM CHLORIDE 125 ML/HR: 9 INJECTION, SOLUTION INTRAVENOUS at 17:52

## 2019-01-05 RX ADMIN — MEROPENEM 500 MG: 500 INJECTION, POWDER, FOR SOLUTION INTRAVENOUS at 23:16

## 2019-01-05 RX ADMIN — BUDESONIDE 0.5 MG: 0.5 INHALANT RESPIRATORY (INHALATION) at 21:23

## 2019-01-05 RX ADMIN — IPRATROPIUM BROMIDE AND ALBUTEROL SULFATE 3 ML: 2.5; .5 SOLUTION RESPIRATORY (INHALATION) at 21:23

## 2019-01-05 RX ADMIN — Medication: at 12:09

## 2019-01-05 RX ADMIN — ALBUMIN (HUMAN) 500 ML: 12.5 INJECTION, SOLUTION INTRAVENOUS at 09:11

## 2019-01-05 RX ADMIN — PANTOPRAZOLE SODIUM 40 MG: 40 TABLET, DELAYED RELEASE ORAL at 09:11

## 2019-01-05 RX ADMIN — MEROPENEM 1 G: 1 INJECTION, POWDER, FOR SOLUTION INTRAVENOUS at 20:09

## 2019-01-05 RX ADMIN — INSULIN HUMAN 2 UNITS: 100 INJECTION, SOLUTION PARENTERAL at 23:13

## 2019-01-05 RX ADMIN — DIATRIZOATE MEGLUMINE AND DIATRIZOATE SODIUM 15 ML: 660; 100 LIQUID ORAL; RECTAL at 12:09

## 2019-01-05 RX ADMIN — OXYCODONE HYDROCHLORIDE 7.5 MG: 5 SOLUTION ORAL at 15:25

## 2019-01-05 RX ADMIN — ALBUTEROL SULFATE 2.5 MG: 2.5 SOLUTION RESPIRATORY (INHALATION) at 12:19

## 2019-01-05 RX ADMIN — FLECAINIDE ACETATE 50 MG: 50 TABLET ORAL at 09:11

## 2019-01-05 RX ADMIN — COLCHICINE 0.6 MG: 0.6 TABLET, FILM COATED ORAL at 09:10

## 2019-01-05 RX ADMIN — INSULIN LISPRO 6 UNITS: 100 INJECTION, SOLUTION INTRAVENOUS; SUBCUTANEOUS at 09:17

## 2019-01-05 RX ADMIN — TAZOBACTAM SODIUM AND PIPERACILLIN SODIUM 3.38 G: 375; 3 INJECTION, SOLUTION INTRAVENOUS at 14:24

## 2019-01-05 RX ADMIN — INSULIN LISPRO 4 UNITS: 100 INJECTION, SOLUTION INTRAVENOUS; SUBCUTANEOUS at 17:49

## 2019-01-05 RX ADMIN — MORPHINE SULFATE 2 MG: 2 INJECTION, SOLUTION INTRAMUSCULAR; INTRAVENOUS at 05:29

## 2019-01-05 RX ADMIN — HYDROMORPHONE HYDROCHLORIDE 0.5 MG: 1 INJECTION, SOLUTION INTRAMUSCULAR; INTRAVENOUS; SUBCUTANEOUS at 23:24

## 2019-01-05 RX ADMIN — IPRATROPIUM BROMIDE AND ALBUTEROL SULFATE 3 ML: 2.5; .5 SOLUTION RESPIRATORY (INHALATION) at 01:53

## 2019-01-05 RX ADMIN — ONDANSETRON 4 MG: 2 INJECTION INTRAMUSCULAR; INTRAVENOUS at 09:43

## 2019-01-05 RX ADMIN — FLUOXETINE HYDROCHLORIDE 20 MG: 20 CAPSULE ORAL at 09:11

## 2019-01-05 RX ADMIN — SODIUM CHLORIDE, POTASSIUM CHLORIDE, SODIUM LACTATE AND CALCIUM CHLORIDE 150 ML/HR: 600; 310; 30; 20 INJECTION, SOLUTION INTRAVENOUS at 18:00

## 2019-01-05 RX ADMIN — ALBUMIN HUMAN 500 ML: 0.05 INJECTION, SOLUTION INTRAVENOUS at 20:55

## 2019-01-05 RX ADMIN — VANCOMYCIN HYDROCHLORIDE 2250 MG: 10 INJECTION, POWDER, LYOPHILIZED, FOR SOLUTION INTRAVENOUS at 20:09

## 2019-01-05 RX ADMIN — MICAFUNGIN SODIUM 100 MG: 20 INJECTION, POWDER, LYOPHILIZED, FOR SOLUTION INTRAVENOUS at 20:09

## 2019-01-05 RX ADMIN — TAZOBACTAM SODIUM AND PIPERACILLIN SODIUM 3.38 G: 375; 3 INJECTION, SOLUTION INTRAVENOUS at 00:35

## 2019-01-05 RX ADMIN — BUDESONIDE 0.5 MG: 0.5 INHALANT RESPIRATORY (INHALATION) at 08:42

## 2019-01-05 RX ADMIN — IPRATROPIUM BROMIDE AND ALBUTEROL SULFATE 3 ML: 2.5; .5 SOLUTION RESPIRATORY (INHALATION) at 16:55

## 2019-01-05 RX ADMIN — METOPROLOL TARTRATE 5 MG: 5 INJECTION, SOLUTION INTRAVENOUS at 18:20

## 2019-01-05 RX ADMIN — Medication 1 PACKET: at 09:18

## 2019-01-05 RX ADMIN — ACETAMINOPHEN 500 MG: 500 TABLET, FILM COATED ORAL at 12:24

## 2019-01-05 RX ADMIN — MORPHINE SULFATE 30 MG: 30 TABLET, EXTENDED RELEASE ORAL at 09:11

## 2019-01-05 RX ADMIN — SODIUM CHLORIDE 100 ML/HR: 9 INJECTION, SOLUTION INTRAVENOUS at 03:50

## 2019-01-05 RX ADMIN — APIXABAN 5 MG: 5 TABLET, FILM COATED ORAL at 09:10

## 2019-01-05 RX ADMIN — METOPROLOL TARTRATE 25 MG: 25 TABLET ORAL at 09:10

## 2019-01-05 RX ADMIN — HEPARIN SODIUM 5000 UNITS: 5000 INJECTION INTRAVENOUS; SUBCUTANEOUS at 21:52

## 2019-01-05 NOTE — SIGNIFICANT NOTE
RN states pt c/o worse SOA, mild tachypnea ~24, right arm pain. Otherwise unchanged. 90% on 2L NC, which he has been on.    Repeat XR. Neb. On zosyn already.  EKG + troponin.  Defer d dimer as known a fib and back on eliquis now.  COPD, consider ABG. Oriented/alert. PCA off.  Post op whipple for locally advanced pancreatic cancer, 12/31.    Please f/u further when rounding.          WBC up more. Change the urine to stat as still not done, can I&O cath if needed. Cr/BUN appears about baseline, but has been a little tachy lately, will do a tad bit of fluids overnight. Looks like a hx of recent bacteremia, no blood cultures done since 12/6, defer repeats to rounding providers.

## 2019-01-05 NOTE — PROGRESS NOTES
INTENSIVIST / PULMONARY FOLLOW UP NOTE     Hospital:  LOS: 5 days   Mr. Todd Phillips, 68 y.o. male is followed for:     Malignant neoplasm of head of pancreas (CMS/HCC)    Hyperlipidemia    COPD (chronic obstructive pulmonary disease) (CMS/HCC)    Essential hypertension    Gastroesophageal reflux disease without esophagitis    DM2 (diabetes mellitus, type 2) (CMS/HCC)    Paroxysmal atrial fibrillation    Chronic anticoagulation, Eliquis    Post-operative confusion and somnolence, likely due to oversedation    Atrial fibrillation with RVR (CMS/HCC)          SUBJECTIVE   Patient is getting more confused and tachypnic.  CT w/ portal venous gas, pneumotasis, and ileus.  Asked by surgery to transfer patient to ICU.    The patient's relevant past medical, surgical, family, and social history were reviewed    Allergies and medications were reviewed    ROS:  Per subjective, all other systems were reviewed and were negative        OBJECTIVE     Vital Sign Min/Max for last 24 hours:  Temp  Min: 97.9 °F (36.6 °C)  Max: 99.8 °F (37.7 °C)   BP  Min: 118/79  Max: 137/86   Pulse  Min: 92  Max: 108   Resp  Min: 16  Max: 32   SpO2  Min: 92 %  Max: 97 %   No Data Recorded     Physical Exam:  General Appearance:  Conversant, in moderate distress  Eyes:  No scleral icterus or pallor, pupils normal  Ears, Nose, Mouth, Throat:  Atraumatic, oropharynx clear  Neck:  Trachea midline, thyroid normal  Respiratory:  Clear to auscultation bilaterally, increased effort, no tenderness to palpation  Cardiovascular:  Regular rate and rhythm, no murmurs, no peripheral edema, no thrill  Gastrointestinal:  Distended, tender, incisions C/D/I  Skin:  Normal temperature, no rash  Psychiatric:  Alert, no agitation  Neuro:  No new focal neurologic deficits observed    Telemetry:              Hemodynamics:   CVP:     PAP:     PAOP:     CO:     CI:     SVI:     SVR:       SpO2: 92 % SpO2  Min: 92 %  Max: 97 %   Device:      Flow Rate:   No Data Recorded      Mechanical Ventilator Settings:                                         Intake/Ouptut 24 hrs (7:00AM - 6:59 AM)  Intake & Output (last 3 days)       01/02 0701 - 01/03 0700 01/03 0701 - 01/04 0700 01/04 0701 - 01/05 0700 01/05 0701 - 01/06 0700    P.O. 0 20 250     I.V. (mL/kg)    2000 (21.9)    Other 412 40 583     NG/ 580 922     IV Piggyback 150  50 50    Total Intake(mL/kg) 816 (8.9) 640 (7) 1805 (19.7) 2050 (22.4)    Urine (mL/kg/hr) 200 (0.1) 0 (0) 0 (0)     Emesis/NG output  60      Drains 510 80 65     Stool   0     Wound        Total Output 710 140 65     Net +106 +500 +1740 +2050            Urine Unmeasured Occurrence 2 x 2 x 2 x 2 x    Stool Unmeasured Occurrence   2 x 2 x          Lines, Drains & Airways    Active LDAs     Name:   Placement date:   Placement time:   Site:   Days:    Closed/Suction Drain 1 Abdomen Bulb 19 Fr.   --    --    Abdomen       Gastrostomy/Enterostomy Gastrostomy-jejunostomy 2 22 Fr.   --    --    --       Single Lumen Implantable Port Right Subclavian   --    --    Subclavian                   Hematology:  Results from last 7 days   Lab Units  01/05/19 0132 01/04/19   0737  01/03/19   0639  01/02/19   0609  01/01/19   0542  12/31/18   1344   WBC 10*3/mm3  14.98*  11.09*  8.19  9.27  12.54*  7.81   HEMOGLOBIN g/dL  9.0*  8.8*  8.1*  8.2*  9.9*  10.3*   HEMATOCRIT %  27.6*  27.0*  24.8*  24.8*  30.1*  31.1*   PLATELETS 10*3/mm3  279  228  227  202  232  187     Electrolytes, Magnesium and Phosphorus:  Results from last 7 days   Lab Units  01/05/19 0132 01/04/19   0738  01/03/19   0639  01/02/19   0609  01/01/19   0542  12/31/18   1343   SODIUM mmol/L  132  133  139  138  133  130*   CHLORIDE mmol/L  103  100  107  106  101  102   POTASSIUM mmol/L  3.8  3.8  3.3*  3.4*  3.5  4.2  5.3   CO2 mmol/L  20.0  26.0  24.0  23.0  22.0  21.0   MAGNESIUM mg/dL  2.3  1.8  1.9  1.9  1.6  1.7   PHOSPHORUS mg/dL   --    --   2.3*   --    --   3.7     Renal:  Results from last 7  days   Lab Units  01/05/19   0132  01/04/19   0738  01/03/19   0639  01/02/19   0609  01/01/19   0542  12/31/18   1343   CREATININE mg/dL  1.13  1.12  0.95  0.92  1.03  1.20   BUN mg/dL  31*  29*  25*  22  22  23     Estimated Creatinine Clearance: 78.7 mL/min (by C-G formula based on SCr of 1.13 mg/dL).  Hepatic:  Results from last 7 days   Lab Units  01/03/19   0639  12/31/18   1343   ALK PHOS U/L  59  78   BILIRUBIN mg/dL  0.7  0.7   ALT (SGPT) U/L  21  28   AST (SGOT) U/L  15  47*     Arterial Blood Gases:  Results from last 7 days   Lab Units  12/31/18   1548  12/31/18   1355   PH, ARTERIAL pH units  7.448  7.421   PCO2, ARTERIAL mm Hg  33.4  32.8   PO2 ART mm Hg  62.8*  75.0*   FIO2 %  21  32       Results from last 7 days   Lab Units  01/01/19   0542   HEMOGLOBIN A1C %  6.10*       Lab Results   Component Value Date    LACTATE 2.0 12/08/2018       Relevant imaging studies and labs from 01/05/19 were reviewed and interpreted by me    Medications (drips):    lactated ringers Last Rate: 150 mL/hr (01/05/19 1800)         albumin human      albumin human 500 mL Intravenous Once   budesonide 0.5 mg Nebulization BID - RT   insulin lispro 0-9 Units Subcutaneous 4x Daily With Meals & Nightly   ipratropium-albuterol 3 mL Nebulization 4x Daily - RT   metoprolol tartrate 5 mg Intravenous Q6H   [START ON 1/6/2019] pantoprazole 40 mg Intravenous Q AM   piperacillin-tazobactam 3.375 g Intravenous Q8H   Scopolamine 1 patch Transdermal Q72H       Assessment/Plan   IMPRESSION / PLAN     Inpatient Problem List:  68 y.o.male:  Active Hospital Problems    Diagnosis   • **Malignant neoplasm of head of pancreas (CMS/HCC)     Added automatically from request for surgery [id number]     • Atrial fibrillation with RVR (CMS/HCC)   • Post-operative confusion and somnolence, likely due to oversedation   • Chronic anticoagulation, Eliquis   • Paroxysmal atrial fibrillation   • DM2 (diabetes mellitus, type 2) (CMS/HCC)   • Gastroesophageal  reflux disease without esophagitis   • COPD (chronic obstructive pulmonary disease) (CMS/HCC)   • Hyperlipidemia   • Essential hypertension        Impression:  68 y.o.male with relevant PMH of T2DM, CKD 3, GERD, Paroxysmal Afib on AC, locally advanced pancreatic cancer treated in a neoadjuvant fashion, complicated by recurrent bouts of cholangitis, outpatient TPN use admitted 12/31/2018 post op Pancreaticoduodenectomy (Whipple), wedge biopsy of liver, portal vein resection and lateral repair, and GJ tube by Dr. Rivas on 12/31.    Transferred to ICU due to concerns of increasing respiratory difficulty, enceophalopathy, and findings of protal venous gas, pneumatosis, and ileus.    Plan:  Post op care - discussed with surgery transfer to ICU for resuscitation and closer monitoring    Sepsis? - will get blood cultures, procalcitonin, lactic acid, and ABG.  3rd spacing of fluids w/ dehydration and acidosis vs developing sepsis.  I am concerned about the latter given his post op state and recurrent cholangitis.  Continue IVF resuscitation, decompress abdomen, broaden abx to Vanco / Inge / Micafungin.  Hopefully can rapidly de-escalate as he improves.    Afib - prn IV metop, resume AC when ok from surgical standpoint    NPO    Will probably need to resume TPN tomorrow    DVT/GI prophylaxis    Plan of care and goals reviewed with mulitdisciplinary team at daily rounds    Critical Care time spent in direct patient care: 45 minutes (excluding procedure time, if applicable) including high complexity decision making to assess, manipulate, and support vital organ system failure in this individual who has impairment of one or more vital organ systems such that there is a high probability of imminent or life threatening deterioration in the patient’s condition.       Fermin Cabrera MD  Intensive Care Medicine  01/05/19 6:05 PM

## 2019-01-05 NOTE — PLAN OF CARE
Problem: Patient Care Overview  Goal: Plan of Care Review  Outcome: Ongoing (interventions implemented as appropriate)  VSS, c/o pain in right shoulder radiating down right arm , cxr, troponin, ekg done. Pt has hard time expressing pain , prn meds given pain better, trop-, ekg NSR,  Tolerating tf, pt incontinent still need u/a. Son at bedside all night. Pt sit up in chair until around MN , encouraging pt to do self care     Problem: Skin Injury Risk (Adult)  Goal: Identify Related Risk Factors and Signs and Symptoms  Outcome: Ongoing (interventions implemented as appropriate)    Goal: Skin Health and Integrity  Outcome: Ongoing (interventions implemented as appropriate)      Problem: Pain, Acute (Adult)  Goal: Identify Related Risk Factors and Signs and Symptoms  Outcome: Ongoing (interventions implemented as appropriate)

## 2019-01-05 NOTE — NURSING NOTE
Patient has been tachycardic, running a low grade fever, tachypneic today. Patient became very weak and diaphoretic. Called surgery on call. After reviewing CT and with his symptoms it was decided to move him to the ICU.

## 2019-01-05 NOTE — PROGRESS NOTES
Ct scan reviewed.  Pt with fluid and air around liver and pneumatosis of small bowel (refeeding after whipple this can be seen).  Pt is more tachypnic but only mildly tachycardic.  I spoke with nurse to stop tube feeds and start NS.  I also spoke with Dr. Cabrera about transfer to ICU. We will resuscitate, continue antibiotics and continue to monitor.  If he worsens he may need exploration but hopefully will improve with conservative measures. D/w Dr. Brody.

## 2019-01-05 NOTE — PROGRESS NOTES
"    [facility] Medicine Services  PROGRESS NOTE    Patient Name: Todd Phillips  : 1950  MRN: [id number]    Date of Admission: 2018  Length of Stay: 5  Primary Care Physician: Adiel Denney MD    Subjective   Subjective     CC:  F/U pancreatic cancer s/p Whipple    HPI:  Patient seen this morning. Complains of abdominal pain. Had respiratory distress overnight, CXR obtained and just showed atelectasis. Remains in sinus rhythm. Troponin negative. Breathing better this morning. Using incentive spirometer. Main complaint is \"I hurt.\"    Review of Systems  CV-no chest pain, no palpitations  Resp-no cough, improved dyspnea  GI-no N/V/D, +abd pain    Otherwise ROS is negative except as mentioned in the HPI.    Objective   Objective     Vital Signs:   Temp:  [97.9 °F (36.6 °C)-99.8 °F (37.7 °C)] 99.4 °F (37.4 °C)  Heart Rate:  [] 99  Resp:  [16-28] 28  BP: (110-137)/(70-86) 137/86        Physical Exam:  Gen-no acute distress  HENT-NCAT, mucous membranes moist  CV-RRR, tachycardic, S1 S2 normal, no m/r/g  Resp-mildly diminished at the bases, no wheezes or rales, nonlabored  Abd-soft, moderate TTP, ND, +BS  Ext-no edema  Neuro-A&Ox3, no focal deficits  Skin-no rashes  Psych-appropriate mood      Results Reviewed:  I have personally reviewed current lab, radiology, and data and agree.    Results from last 7 days   Lab Units  19   0132  19   0737  19   0639   19   0542   WBC 10*3/mm3  14.98*  11.09*  8.19   < >  12.54*   HEMOGLOBIN g/dL  9.0*  8.8*  8.1*   < >  9.9*   HEMATOCRIT %  27.6*  27.0*  24.8*   < >  30.1*   PLATELETS 10*3/mm3  279  228  227   < >  232   INR    --    --    --    --   1.09    < > = values in this interval not displayed.     Results from last 7 days   Lab Units  19   0132  19   0738  19   0639   18   1343   SODIUM mmol/L  132  133  139   < >  130*   POTASSIUM mmol/L  3.8  3.8  3.3*  3.4*   < >  5.3 "   CHLORIDE mmol/L  103  100  107   < >  102   CO2 mmol/L  20.0  26.0  24.0   < >  21.0   BUN mg/dL  31*  29*  25*   < >  23   CREATININE mg/dL  1.13  1.12  0.95   < >  1.20   GLUCOSE mg/dL  233*  174*  207*   < >  239*   CALCIUM mg/dL  8.1*  8.5*  8.0*   < >  8.4*   ALT (SGPT) U/L   --    --   21   --   28   AST (SGOT) U/L   --    --   15   --   47*   TROPONIN I ng/mL  0.023   --    --    --    --     < > = values in this interval not displayed.     Estimated Creatinine Clearance: 78.7 mL/min (by C-G formula based on SCr of 1.13 mg/dL).    BNP   Date Value Ref Range Status   01/05/2019 58.0 0.0 - 100.0 pg/mL Final     Comment:     Results may be falsely decreased if patient taking Biotin.       Microbiology Results Abnormal     None          Imaging Results (last 24 hours)     Procedure Component Value Units Date/Time    XR Chest 1 View [[id number]] Collected:  01/05/19 0045     Updated:  01/05/19 0154    Narrative:       EXAM:    XR Chest, 1 View     EXAM DATE/TIME:    1/5/2019 12:45 AM     CLINICAL HISTORY:    68 years old, male; Malignant neoplasm of pancreas, unspecified; Encounter for   other specified special examinations; Other reduced mobility; Other reduced   mobility; Signs and symptoms; Shortness of breath; Patient HX: SOA, right arm   pain, post op whipple     TECHNIQUE:    XR of the chest, 1 view.     COMPARISON:    CR XR CHEST 1 VW 1/3/2019 4:08 PM     FINDINGS:    Tubes, catheters and devices:  Right IJ power port in place. Monitor leads   project over the chest.    Lungs:  Low lung volumes with mild bibasilar atelectasis.    Pleural space:  No significant costophrenic angle blunting. No evidence of   pneumothorax.    Heart/Mediastinum:  Heart size appears prominent but is likely exaggerated by   the portable AP technique.    Vasculature:  Atherosclerotic tortuosity of the thoracic aorta.    Bones/joints: No acute osseous abnormality.       Impression:       Low lung volumes with mild bibasilar  atelectasis.     THIS DOCUMENT HAS BEEN ELECTRONICALLY SIGNED BY DANE HIRSCH JR. MD          Results for orders placed during the hospital encounter of 10/22/17   Adult Transthoracic Echo Complete W/ Cont if Necessary Per Protocol    Narrative · Left ventricular systolic function is normal. Estimated EF = 60%.  · The cardiac valves are anatomically and functionally normal.          I have reviewed the medications:    Current Facility-Administered Medications:   •  acetaminophen (TYLENOL) tablet 500 mg, 500 mg, Oral, Q4H PRN, Miquel Brody MD  •  albumin human 5 % bottle  - ADS Override Pull, , , ,   •  albuterol (PROVENTIL) nebulizer solution 0.083% 2.5 mg/3mL, 2.5 mg, Nebulization, Q4H PRN, Miquel Brody MD  •  albuterol (PROVENTIL) nebulizer solution 0.083% 2.5 mg/3mL, 2.5 mg, Nebulization, BID PRN, Miquel Brody MD  •  allopurinol (ZYLOPRIM) tablet 100 mg, 100 mg, Oral, Nightly, Miquel Brody MD, 100 mg at 01/04/19 2034  •  apixaban (ELIQUIS) tablet 5 mg, 5 mg, Oral, Q12H, Marjorie Lechuga MD, 5 mg at 01/05/19 0910  •  budesonide (PULMICORT) nebulizer solution 0.5 mg, 0.5 mg, Nebulization, BID - RT, Miquel Brody MD, 0.5 mg at 01/05/19 0842  •  colchicine tablet 0.6 mg, 0.6 mg, Oral, Daily, Miquel Brody MD, 0.6 mg at 01/05/19 0910  •  flecainide (TAMBOCOR) tablet 50 mg, 50 mg, Oral, Q12H, Miquel Brody MD, 50 mg at 01/05/19 0911  •  FLUoxetine (PROzac) capsule 20 mg, 20 mg, Oral, BID, Miquel Brody MD, 20 mg at 01/05/19 0911  •  HYDROmorphone (DILAUDID) injection 1 mg, 1 mg, Intravenous, Q2H PRN, Miquel Brody MD, 1 mg at 01/04/19 2112  •  insulin lispro (humaLOG) injection 0-9 Units, 0-9 Units, Subcutaneous, 4x Daily With Meals & Nightly, Miquel Brody MD, 6 Units at 01/05/19 0917  •  LORazepam (ATIVAN) tablet 0.5 mg, 0.5 mg, Oral, Q6H PRN, Miquel Brody MD  •  Magnesium Sulfate 2 gram Bolus,  followed by 8 gram infusion (total Mg dose 10 grams)- Mg less than or equal to 1mg/dL, 2 g, Intravenous, PRN **OR** Magnesium Sulfate 2 gram / 50mL Infusion (GIVE X 3 BAGS TO EQUAL 6GM TOTAL DOSE) - Mg 1.1 - 1.5 mg/dl, 2 g, Intravenous, PRN **OR** Magnesium Sulfate 4 gram infusion- Mg 1.6-1.9 mg/dL, 4 g, Intravenous, PRN, Andreina Beltran MD, Last Rate: 25 mL/hr at 01/04/19 1643, 4 g at 01/04/19 1643  •  metoprolol tartrate (LOPRESSOR) injection 5 mg, 5 mg, Intravenous, Q5 Min PRN, Marjorie Lechuga MD, 5 mg at 01/03/19 1125  •  metoprolol tartrate (LOPRESSOR) tablet 25 mg, 25 mg, Oral, Q12H, Marjorie Lechuga MD, 25 mg at 01/05/19 0910  •  montelukast (SINGULAIR) tablet 10 mg, 10 mg, Oral, Nightly, Miquel Brody MD, 10 mg at 01/04/19 2034  •  Morphine (MS CONTIN) 12 hr tablet 30 mg, 30 mg, Oral, Q12H, Miquel Brody MD, 30 mg at 01/05/19 0911  •  morphine injection 2 mg, 2 mg, Intravenous, Q4H PRN, Miquel Brody MD, 2 mg at 01/05/19 0529  •  naloxone (NARCAN) injection 0.1 mg, 0.1 mg, Intravenous, Q5 Min PRN, Miquel Brody MD  •  nitroglycerin (NITROSTAT) SL tablet 0.4 mg, 0.4 mg, Sublingual, PRN, Miquel Brody MD  •  ondansetron (ZOFRAN) injection 4 mg, 4 mg, Intravenous, Q6H PRN, Miquel Brody MD, 4 mg at 01/05/19 0943  •  oxyCODONE (ROXICODONE) 5 MG/5ML solution 10 mg, 10 mg, Per G Tube, Q4H PRN, Miquel Brody MD  •  oxyCODONE (ROXICODONE) 5 MG/5ML solution 7.5 mg, 7.5 mg, Per G Tube, Q4H PRN, Miquel Brody MD  •  pantoprazole (PROTONIX) EC tablet 40 mg, 40 mg, Oral, QAM, Miquel Brody MD, 40 mg at 01/05/19 0911  •  piperacillin-tazobactam (ZOSYN) 3.375 g in iso-osmotic dextrose 50 ml (premix), 3.375 g, Intravenous, Q8H, Miquel Brody MD, 3.375 g at 01/05/19 0035  •  potassium chloride (MICRO-K) CR capsule 40 mEq, 40 mEq, Oral, PRN **OR** potassium chloride (KLOR-CON) packet 40 mEq, 40 mEq, Oral, PRN, 40 mEq at  01/04/19 2035 **OR** potassium chloride 10 mEq in 100 mL IVPB, 10 mEq, Intravenous, Q1H PRN, Marjorie Lechuga MD  •  PRO-STAT 1 packet, 1 packet, Per J Tube, BID, Reta Gamble, CHEIKH, 1 packet at 01/05/19 0918  •  prochlorperazine (COMPAZINE) injection 5 mg, 5 mg, Intravenous, Q6H PRN **OR** prochlorperazine (COMPAZINE) tablet 5 mg, 5 mg, Oral, Q6H PRN, 5 mg at 01/04/19 0223 **OR** prochlorperazine (COMPAZINE) suppository 25 mg, 25 mg, Rectal, Q12H PRN, Miquel Brody MD  •  Scopolamine (TRANSDERM-SCOP) 1.5 MG/3DAYS patch 1 patch, 1 patch, Transdermal, Q72H, Miquel Brody MD, 1 patch at 01/04/19 0827  •  sennosides-docusate sodium (SENOKOT-S) 8.6-50 MG tablet 2 tablet, 2 tablet, Oral, Nightly, Miquel Brody MD, 2 tablet at 01/04/19 2035    Facility-Administered Medications Ordered in Other Encounters:   •  sodium chloride 0.9 % infusion  - ADS Override Pull, , , ,   •  sodium chloride 0.9 % infusion  - ADS Override Pull, , , ,       Assessment/Plan   Assessment / Plan     Active Hospital Problems    Diagnosis Date Noted   • **Malignant neoplasm of head of pancreas (CMS/HCC) [C25.0] 09/05/2018     Added automatically from request for surgery [id number]     • Atrial fibrillation with RVR (CMS/HCC) [I48.91] 01/03/2019   • Post-operative confusion and somnolence, likely due to oversedation [R41.0] 01/02/2019   • Chronic anticoagulation, Eliquis [Z79.01] 10/27/2017   • Paroxysmal atrial fibrillation [I48.91] 10/22/2017   • DM2 (diabetes mellitus, type 2) (CMS/HCC) [E11.9] 10/22/2017   • Gastroesophageal reflux disease without esophagitis [K21.9] 02/09/2017   • COPD (chronic obstructive pulmonary disease) (CMS/HCC) [J44.9] 06/16/2016   • Hyperlipidemia [E78.5] 06/16/2016   • Essential hypertension [I10] 06/16/2016          Brief Hospital Course to date:  Todd Phillips is a 68 y.o. male with hx of pancreatic cancer, COPD, diabetes mellitus type 2, PAF, GERD, essential hypertension, and  hyperlipidemia who was admitted by Dr. Brody 12/31/18 and underwent planned Whipple for locally advanced pancreatic cancer. Hospitalists consulted post-operatively for medical management.    PLAN:  --Post-op confusion and somnolence likely due to oversedation and narcotics. PCA turned off. Continue PRN morphine and Dilaudid. Resolved.  --Dr. Brody following. Started on enteral feeds 1/2. Now on clear liquids as well.   --He continues to have pain and leukocytosis is worse today despite being on Zosyn. UA unremarkable as was CXR which only showed atelectasis. Dr. Brody repeating CT A/P today. Continue Zosyn. Check blood cultures given hx of bacteremia. Will schedule nebs and encourage IS. At risk for developing pneumonia.  --Went into Afib with RVR with chest pain 1/3 following PT session. Started on Metoprolol 25 mg BID. Has now converted to NSR but still a bit tachycardic. Continue Flecainide. Resumed Eliquis.  --Continue PT/OT.    Discussed with family at length.    DVT Prophylaxis:  Eliquis    Disposition: I expect the patient to be discharged TBD    CODE STATUS:   Code Status and Medical Interventions:   Ordered at: 12/31/18 1604     Level Of Support Discussed With:    Patient     Code Status:    CPR     Medical Interventions (Level of Support Prior to Arrest):    Full         Electronically signed by Marjorie Lechuga MD, 01/05/19, 12:28 PM.

## 2019-01-05 NOTE — PLAN OF CARE
Problem: Patient Care Overview  Goal: Plan of Care Review  Outcome: Ongoing (interventions implemented as appropriate)   01/05/19 1511   OTHER   Outcome Summary Tachycardic. Low grade fever today. Given tylenol. Blood cultures drawn today. CT abd pelvis today. SOA , wheezing, and tachypenic. Called hospitalist to evaluate and orders for nebs put in. Tolerating TF well. Complaints of pain. Recieving pain meds.Will continue to monitor.   Coping/Psychosocial   Plan of Care Reviewed With patient;family   Plan of Care Review   Progress no change     Goal: Individualization and Mutuality  Outcome: Ongoing (interventions implemented as appropriate)    Goal: Discharge Needs Assessment  Outcome: Ongoing (interventions implemented as appropriate)      Problem: Fall Risk (Adult)  Goal: Absence of Fall  Outcome: Ongoing (interventions implemented as appropriate)      Problem: Skin Injury Risk (Adult)  Goal: Identify Related Risk Factors and Signs and Symptoms  Outcome: Outcome(s) achieved Date Met: 01/05/19    Goal: Skin Health and Integrity  Outcome: Ongoing (interventions implemented as appropriate)      Problem: Pain, Acute (Adult)  Goal: Identify Related Risk Factors and Signs and Symptoms  Outcome: Outcome(s) achieved Date Met: 01/05/19    Goal: Acceptable Pain Control/Comfort Level  Outcome: Ongoing (interventions implemented as appropriate)

## 2019-01-05 NOTE — PROGRESS NOTES
"General Surgery Daily Progress Note    01/05/19    Todd Phillips  [id number]  1950    5 Days Post-Op    Reason for Evaluation: Locally advanced pancreatic cancer  Subjective:  Patient's mental status is improving but he continues to complain of uncontrolled pain despite the fact that we added his MS Contin back yesterday.  Had some respiratory issues overnight.  He is only moving 500 on his incentive spirometer.    Objective:  /84 (BP Location: Right arm, Patient Position: Lying)   Pulse 100 Comment: post HR-100  Temp 99.8 °F (37.7 °C) (Axillary)   Resp 28   Ht 182.9 cm (72\")   Wt 91.5 kg (201 lb 12.8 oz)   SpO2 96%   BMI 27.37 kg/m²       INTAKE/OUTPUT      Intake/Output Summary (Last 24 hours) at 1/5/2019 1052  Last data filed at 1/5/2019 0600  Gross per 24 hour   Intake 1755 ml   Output 20 ml   Net 1735 ml       General Appearance: Patient appears comfortable and in no acute distress despite his complaints  Eyes: Anicteric  Neck: Trachea midline   Cardiovascular:  RRR  Lungs:  Bilateral respirations unlabored   Abdomen:  His abdomen is mildly distended but has no peritonitis.  Extremities:  No cyanosis or edema   Skin: Normal color  Neurologic: awake and conversant   Surgical Site: No signs of infection in his drain is now serous in character with no bile staining      Imaging Results (last 24 hours)     Procedure Component Value Units Date/Time    XR Chest 1 View [[id number]] Collected:  01/05/19 0045     Updated:  01/05/19 0154    Narrative:       EXAM:    XR Chest, 1 View     EXAM DATE/TIME:    1/5/2019 12:45 AM     CLINICAL HISTORY:    68 years old, male; Malignant neoplasm of pancreas, unspecified; Encounter for   other specified special examinations; Other reduced mobility; Other reduced   mobility; Signs and symptoms; Shortness of breath; Patient HX: SOA, right arm   pain, post op whipple     TECHNIQUE:    XR of the chest, 1 view.     COMPARISON:    CR XR CHEST 1 VW 1/3/2019 4:08 PM "     FINDINGS:    Tubes, catheters and devices:  Right IJ power port in place. Monitor leads   project over the chest.    Lungs:  Low lung volumes with mild bibasilar atelectasis.    Pleural space:  No significant costophrenic angle blunting. No evidence of   pneumothorax.    Heart/Mediastinum:  Heart size appears prominent but is likely exaggerated by   the portable AP technique.    Vasculature:  Atherosclerotic tortuosity of the thoracic aorta.    Bones/joints: No acute osseous abnormality.       Impression:       Low lung volumes with mild bibasilar atelectasis.     THIS DOCUMENT HAS BEEN ELECTRONICALLY SIGNED BY DANE HIRSCH JR. MD          Labs:  Lab Results (last 24 hours)     Procedure Component Value Units Date/Time    Urinalysis, Microscopic Only - Urine, Clean Catch [[id number]]  (Abnormal) Collected:  01/05/19 0730    Specimen:  Urine, Clean Catch Updated:  01/05/19 0805     RBC, UA 3-6 /HPF      WBC, UA 3-5 /HPF      Bacteria, UA None Seen /HPF      Squamous Epithelial Cells, UA 0-2 /HPF      Yeast, UA Large/3+ Yeast /HPF      Hyaline Casts, UA 0-6 /LPF      Coarse Granular Casts, UA 0-2 /LPF      Fine Granular Casts, UA 0-2 /LPF      Methodology Manual Light Microscopy    Urinalysis With Culture If Indicated - Urine, Clean Catch [[id number]]  (Abnormal) Collected:  01/05/19 0730    Specimen:  Urine, Clean Catch Updated:  01/05/19 0749     Color, UA Dark Yellow     Appearance, UA Cloudy     pH, UA 5.5     Specific Gravity, UA 1.037     Glucose, UA Negative     Ketones, UA Trace     Bilirubin, UA Negative     Blood, UA Moderate (2+)     Protein,  mg/dL (2+)     Leuk Esterase, UA Negative     Nitrite, UA Negative     Urobilinogen, UA 1.0 E.U./dL    POC Glucose Once [[id number]]  (Abnormal) Collected:  01/05/19 0742    Specimen:  Blood Updated:  01/05/19 0744     Glucose 299 mg/dL     POC Glucose Once [[id number]]  (Abnormal) Collected:  01/05/19 0628    Specimen:  Blood Updated:  01/05/19 0629     Glucose  262 mg/dL     CBC & Differential [[id number]] Collected:  01/05/19 0132    Specimen:  Blood Updated:  01/05/19 0231    Narrative:       The following orders were created for panel order CBC & Differential.  Procedure                               Abnormality         Status                     ---------                               -----------         ------                     Scan Slide[[id number]]                                                                  CBC Auto Differential[[id number]]        Abnormal            Final result                 Please view results for these tests on the individual orders.    CBC Auto Differential [[id number]]  (Abnormal) Collected:  01/05/19 0132    Specimen:  Blood Updated:  01/05/19 0231     WBC 14.98 10*3/mm3      RBC 3.03 10*6/mm3      Hemoglobin 9.0 g/dL      Hematocrit 27.6 %      MCV 91.1 fL      MCH 29.7 pg      MCHC 32.6 g/dL      RDW 16.6 %      RDW-SD 54.3 fl      MPV 9.5 fL      Platelets 279 10*3/mm3      Neutrophil % 89.4 %      Lymphocyte % 4.8 %      Monocyte % 5.7 %      Eosinophil % 0.0 %      Basophil % 0.1 %      Immature Grans % 1.5 %      Neutrophils, Absolute 13.38 10*3/mm3      Lymphocytes, Absolute 0.72 10*3/mm3      Monocytes, Absolute 0.86 10*3/mm3      Eosinophils, Absolute 0.00 10*3/mm3      Basophils, Absolute 0.02 10*3/mm3      Immature Grans, Absolute 0.22 10*3/mm3     Potassium [[id number]]  (Normal) Collected:  01/05/19 0132    Specimen:  Blood Updated:  01/05/19 0210     Potassium 3.8 mmol/L     Magnesium [[id number]]  (Normal) Collected:  01/05/19 0132    Specimen:  Blood Updated:  01/05/19 0210     Magnesium 2.3 mg/dL     Troponin [[id number]]  (Normal) Collected:  01/05/19 0132    Specimen:  Blood Updated:  01/05/19 0210     Troponin I 0.023 ng/mL      Comment: Results may be falsely decreased if patient taking Biotin.       BNP [[id number]]  (Normal) Collected:  01/05/19 0132    Specimen:  Blood Updated:  01/05/19 0210     BNP 58.0 pg/mL       Comment: Results may be falsely decreased if patient taking Biotin.       Basic Metabolic Panel [[id number]]  (Abnormal) Collected:  01/05/19 0132    Specimen:  Blood Updated:  01/05/19 0210     Glucose 233 mg/dL      BUN 31 mg/dL      Creatinine 1.13 mg/dL      Sodium 132 mmol/L      Potassium 3.8 mmol/L      Chloride 103 mmol/L      CO2 20.0 mmol/L      Calcium 8.1 mg/dL      eGFR Non African Amer 64 mL/min/1.73      BUN/Creatinine Ratio 27.4     Anion Gap 9.0 mmol/L     Narrative:       [employer] Guidelines    Stage     Description        GFR  1         Normal or High     90+  2         Mild decrease      60-89  3         Moderate decrease  30-59  4         Severe decrease    15-29  5         Kidney failure     <15    The MDRD GFR formula is only valid for adults with stable renal function between ages 18 and 70.    POC Glucose Once [[id number]]  (Abnormal) Collected:  01/04/19 2100    Specimen:  Blood Updated:  01/04/19 2101     Glucose 165 mg/dL     POC Glucose Once [[id number]]  (Abnormal) Collected:  01/04/19 1731    Specimen:  Blood Updated:  01/04/19 1732     Glucose 167 mg/dL     POC Glucose Once [[id number]]  (Abnormal) Collected:  01/04/19 1124    Specimen:  Blood Updated:  01/04/19 1127     Glucose 178 mg/dL             Assessment:  Locally advanced pancreatic cancer.  Although his drain output and overall status make me not concerned for a significant intra-abdominal process given the fact that he continues to complain of uncontrolled abdominal pain and his leukocytosis is present I think abdominal imaging is at least reasonable to rule out any occult undrained collections.    Plan:   Were continuing antibiotics given his history of repeated admissions for cholangitis.  We'll obtain CT scan of the abdomen and pelvis today for completeness and to rule out occult infections.  Drain may be removed later today or tomorrow given its low output and serous character.  We'll resume tube feeds  based on findings of the CT scan    Miquel Brody MD - 1/5/2019, 10:52 AM

## 2019-01-06 ENCOUNTER — APPOINTMENT (OUTPATIENT)
Dept: CT IMAGING | Facility: HOSPITAL | Age: 69
End: 2019-01-06

## 2019-01-06 ENCOUNTER — APPOINTMENT (OUTPATIENT)
Dept: GENERAL RADIOLOGY | Facility: HOSPITAL | Age: 69
End: 2019-01-06

## 2019-01-06 LAB
ALBUMIN SERPL-MCNC: 3 G/DL (ref 3.2–4.8)
ALBUMIN/GLOB SERPL: 1.6 G/DL (ref 1.5–2.5)
ALP SERPL-CCNC: 84 U/L (ref 25–100)
ALT SERPL W P-5'-P-CCNC: 11 U/L (ref 7–40)
ANION GAP SERPL CALCULATED.3IONS-SCNC: 9 MMOL/L (ref 3–11)
ARTERIAL PATENCY WRIST A: ABNORMAL
AST SERPL-CCNC: 18 U/L (ref 0–33)
ATMOSPHERIC PRESS: ABNORMAL MMHG
BASE EXCESS BLDA CALC-SCNC: -1.4 MMOL/L (ref 0–2)
BASOPHILS # BLD MANUAL: 0 10*3/MM3 (ref 0–0.2)
BASOPHILS NFR BLD AUTO: 0 % (ref 0–1)
BDY SITE: ABNORMAL
BILIRUB SERPL-MCNC: 1 MG/DL (ref 0.3–1.2)
BODY TEMPERATURE: 37 C
BUN BLD-MCNC: 36 MG/DL (ref 9–23)
BUN/CREAT SERPL: 31.6 (ref 7–25)
BURR CELLS BLD QL SMEAR: ABNORMAL
CALCIUM SPEC-SCNC: 8 MG/DL (ref 8.7–10.4)
CHLORIDE SERPL-SCNC: 100 MMOL/L (ref 99–109)
CHOLEST SERPL-MCNC: 46 MG/DL (ref 0–200)
CO2 BLDA-SCNC: 21.3 MMOL/L (ref 23–27)
CO2 SERPL-SCNC: 22 MMOL/L (ref 20–31)
COHGB MFR BLD: 1.8 % (ref 0–2)
CREAT BLD-MCNC: 1.14 MG/DL (ref 0.6–1.3)
CRP SERPL-MCNC: 20.71 MG/DL (ref 0–1)
D-LACTATE SERPL-SCNC: 2.2 MMOL/L (ref 0.5–2)
DEPRECATED RDW RBC AUTO: 54 FL (ref 37–54)
EOSINOPHIL # BLD MANUAL: 0 10*3/MM3 (ref 0.1–0.3)
EOSINOPHIL NFR BLD MANUAL: 0 % (ref 0–3)
ERYTHROCYTE [DISTWIDTH] IN BLOOD BY AUTOMATED COUNT: 16.5 % (ref 11.3–14.5)
GFR SERPL CREATININE-BSD FRML MDRD: 64 ML/MIN/1.73
GLOBULIN UR ELPH-MCNC: 1.9 GM/DL
GLUCOSE BLD-MCNC: 179 MG/DL (ref 70–100)
GLUCOSE BLDC GLUCOMTR-MCNC: 142 MG/DL (ref 70–130)
GLUCOSE BLDC GLUCOMTR-MCNC: 177 MG/DL (ref 70–130)
GLUCOSE BLDC GLUCOMTR-MCNC: 178 MG/DL (ref 70–130)
GLUCOSE BLDC GLUCOMTR-MCNC: 229 MG/DL (ref 70–130)
HCO3 BLDA-SCNC: 20.6 MMOL/L (ref 20–26)
HCT VFR BLD AUTO: 27.1 % (ref 38.9–50.9)
HCT VFR BLD CALC: 29 %
HGB BLD-MCNC: 9 G/DL (ref 13.1–17.5)
HGB BLDA-MCNC: 9.4 G/DL (ref 13.5–17.5)
HOROWITZ INDEX BLD+IHG-RTO: 28 %
LYMPHOCYTES # BLD MANUAL: 0.18 10*3/MM3 (ref 0.6–4.8)
LYMPHOCYTES NFR BLD MANUAL: 1 % (ref 24–44)
LYMPHOCYTES NFR BLD MANUAL: 5 % (ref 0–12)
MAGNESIUM SERPL-MCNC: 2.1 MG/DL (ref 1.3–2.7)
MCH RBC QN AUTO: 29.7 PG (ref 27–31)
MCHC RBC AUTO-ENTMCNC: 33.2 G/DL (ref 32–36)
MCV RBC AUTO: 89.4 FL (ref 80–99)
METAMYELOCYTES NFR BLD MANUAL: 2 % (ref 0–0)
METHGB BLD QL: 0.4 % (ref 0–1.5)
MODALITY: ABNORMAL
MONOCYTES # BLD AUTO: 0.92 10*3/MM3 (ref 0–1)
NEUTROPHILS # BLD AUTO: 16.89 10*3/MM3 (ref 1.5–8.3)
NEUTROPHILS NFR BLD MANUAL: 79 % (ref 41–71)
NEUTS BAND NFR BLD MANUAL: 13 % (ref 0–5)
NOTE: ABNORMAL
OXYHGB MFR BLDV: 91.9 % (ref 94–99)
PCO2 BLDA: 24.9 MM HG
PCO2 TEMP ADJ BLD: 24.9 MM HG (ref 35–48)
PH BLDA: 7.53 PH UNITS (ref 7.35–7.45)
PH, TEMP CORRECTED: 7.53 PH UNITS
PHOSPHATE SERPL-MCNC: 3.4 MG/DL (ref 2.4–5.1)
PLAT MORPH BLD: NORMAL
PLATELET # BLD AUTO: 279 10*3/MM3 (ref 150–450)
PMV BLD AUTO: 10.2 FL (ref 6–12)
PO2 BLDA: 61.6 MM HG (ref 83–108)
PO2 TEMP ADJ BLD: 61.6 MM HG (ref 83–108)
POTASSIUM BLD-SCNC: 3.3 MMOL/L (ref 3.5–5.5)
POTASSIUM BLD-SCNC: 3.7 MMOL/L (ref 3.5–5.5)
PREALB SERPL-MCNC: <5 MG/DL (ref 10–40)
PROCALCITONIN SERPL-MCNC: 6.71 NG/ML
PROT SERPL-MCNC: 4.9 G/DL (ref 5.7–8.2)
RBC # BLD AUTO: 3.03 10*6/MM3 (ref 4.2–5.76)
SCAN SLIDE: NORMAL
SODIUM BLD-SCNC: 131 MMOL/L (ref 132–146)
TRIGL SERPL-MCNC: 112 MG/DL (ref 0–150)
VENTILATOR MODE: ABNORMAL
WBC MORPH BLD: NORMAL
WBC NRBC COR # BLD: 18.36 10*3/MM3 (ref 3.5–10.8)

## 2019-01-06 PROCEDURE — 84478 ASSAY OF TRIGLYCERIDES: CPT

## 2019-01-06 PROCEDURE — 25010000002 ONDANSETRON PER 1 MG: Performed by: SURGERY

## 2019-01-06 PROCEDURE — 80053 COMPREHEN METABOLIC PANEL: CPT | Performed by: HOSPITALIST

## 2019-01-06 PROCEDURE — 70450 CT HEAD/BRAIN W/O DYE: CPT

## 2019-01-06 PROCEDURE — 82805 BLOOD GASES W/O2 SATURATION: CPT

## 2019-01-06 PROCEDURE — 86140 C-REACTIVE PROTEIN: CPT

## 2019-01-06 PROCEDURE — 94640 AIRWAY INHALATION TREATMENT: CPT

## 2019-01-06 PROCEDURE — 85007 BL SMEAR W/DIFF WBC COUNT: CPT | Performed by: HOSPITALIST

## 2019-01-06 PROCEDURE — 82465 ASSAY BLD/SERUM CHOLESTEROL: CPT

## 2019-01-06 PROCEDURE — 25010000002 POTASSIUM CHLORIDE PER 2 MEQ OF POTASSIUM

## 2019-01-06 PROCEDURE — C1751 CATH, INF, PER/CENT/MIDLINE: HCPCS

## 2019-01-06 PROCEDURE — 02HV33Z INSERTION OF INFUSION DEVICE INTO SUPERIOR VENA CAVA, PERCUTANEOUS APPROACH: ICD-10-PCS | Performed by: INTERNAL MEDICINE

## 2019-01-06 PROCEDURE — 84100 ASSAY OF PHOSPHORUS: CPT | Performed by: INTERNAL MEDICINE

## 2019-01-06 PROCEDURE — 94760 N-INVAS EAR/PLS OXIMETRY 1: CPT

## 2019-01-06 PROCEDURE — 25010000002 CALCIUM GLUCONATE PER 10 ML

## 2019-01-06 PROCEDURE — C1894 INTRO/SHEATH, NON-LASER: HCPCS

## 2019-01-06 PROCEDURE — 25010000002 HEPARIN (PORCINE) PER 1000 UNITS: Performed by: INTERNAL MEDICINE

## 2019-01-06 PROCEDURE — 82962 GLUCOSE BLOOD TEST: CPT

## 2019-01-06 PROCEDURE — 84145 PROCALCITONIN (PCT): CPT | Performed by: INTERNAL MEDICINE

## 2019-01-06 PROCEDURE — 83735 ASSAY OF MAGNESIUM: CPT | Performed by: INTERNAL MEDICINE

## 2019-01-06 PROCEDURE — 25010000002 HYDROMORPHONE PER 4 MG: Performed by: INTERNAL MEDICINE

## 2019-01-06 PROCEDURE — 36600 WITHDRAWAL OF ARTERIAL BLOOD: CPT

## 2019-01-06 PROCEDURE — 84134 ASSAY OF PREALBUMIN: CPT

## 2019-01-06 PROCEDURE — 94799 UNLISTED PULMONARY SVC/PX: CPT

## 2019-01-06 PROCEDURE — 25010000002 MEROPENEM

## 2019-01-06 PROCEDURE — 99291 CRITICAL CARE FIRST HOUR: CPT | Performed by: INTERNAL MEDICINE

## 2019-01-06 PROCEDURE — 71045 X-RAY EXAM CHEST 1 VIEW: CPT

## 2019-01-06 PROCEDURE — 84132 ASSAY OF SERUM POTASSIUM: CPT | Performed by: NURSE PRACTITIONER

## 2019-01-06 PROCEDURE — 83605 ASSAY OF LACTIC ACID: CPT | Performed by: INTERNAL MEDICINE

## 2019-01-06 PROCEDURE — 85025 COMPLETE CBC W/AUTO DIFF WBC: CPT | Performed by: HOSPITALIST

## 2019-01-06 PROCEDURE — 25010000003 POTASSIUM CHLORIDE 10 MEQ/100ML SOLUTION: Performed by: HOSPITALIST

## 2019-01-06 PROCEDURE — 63710000001 INSULIN REGULAR HUMAN PER 5 UNITS: Performed by: NURSE PRACTITIONER

## 2019-01-06 PROCEDURE — 25010000002 VANCOMYCIN PER 500 MG

## 2019-01-06 PROCEDURE — 25010000002 MAGNESIUM SULFATE PER 500 MG OF MAGNESIUM

## 2019-01-06 RX ORDER — SODIUM CHLORIDE 0.9 % (FLUSH) 0.9 %
10 SYRINGE (ML) INJECTION EVERY 12 HOURS SCHEDULED
Status: DISCONTINUED | OUTPATIENT
Start: 2019-01-06 | End: 2019-01-15 | Stop reason: HOSPADM

## 2019-01-06 RX ORDER — SODIUM CHLORIDE 0.9 % (FLUSH) 0.9 %
10 SYRINGE (ML) INJECTION AS NEEDED
Status: DISCONTINUED | OUTPATIENT
Start: 2019-01-06 | End: 2019-01-15 | Stop reason: HOSPADM

## 2019-01-06 RX ADMIN — PANTOPRAZOLE SODIUM 40 MG: 40 INJECTION, POWDER, FOR SOLUTION INTRAVENOUS at 06:26

## 2019-01-06 RX ADMIN — I.V. FAT EMULSION 200 ML: 20 EMULSION INTRAVENOUS at 18:20

## 2019-01-06 RX ADMIN — HEPARIN SODIUM 5000 UNITS: 5000 INJECTION INTRAVENOUS; SUBCUTANEOUS at 06:25

## 2019-01-06 RX ADMIN — SODIUM CHLORIDE, POTASSIUM CHLORIDE, SODIUM LACTATE AND CALCIUM CHLORIDE 150 ML/HR: 600; 310; 30; 20 INJECTION, SOLUTION INTRAVENOUS at 00:32

## 2019-01-06 RX ADMIN — MEROPENEM 500 MG: 500 INJECTION, POWDER, FOR SOLUTION INTRAVENOUS at 08:10

## 2019-01-06 RX ADMIN — HEPARIN SODIUM 5000 UNITS: 5000 INJECTION INTRAVENOUS; SUBCUTANEOUS at 21:45

## 2019-01-06 RX ADMIN — SODIUM CHLORIDE, POTASSIUM CHLORIDE, SODIUM LACTATE AND CALCIUM CHLORIDE 75 ML/HR: 600; 310; 30; 20 INJECTION, SOLUTION INTRAVENOUS at 21:40

## 2019-01-06 RX ADMIN — HYDROMORPHONE HYDROCHLORIDE 0.5 MG: 1 INJECTION, SOLUTION INTRAMUSCULAR; INTRAVENOUS; SUBCUTANEOUS at 08:10

## 2019-01-06 RX ADMIN — SODIUM CHLORIDE, PRESERVATIVE FREE 10 ML: 5 INJECTION INTRAVENOUS at 20:17

## 2019-01-06 RX ADMIN — HEPARIN SODIUM 5000 UNITS: 5000 INJECTION INTRAVENOUS; SUBCUTANEOUS at 14:28

## 2019-01-06 RX ADMIN — POTASSIUM CHLORIDE 10 MEQ: 10 INJECTION, SOLUTION INTRAVENOUS at 18:18

## 2019-01-06 RX ADMIN — BUDESONIDE 0.5 MG: 0.5 INHALANT RESPIRATORY (INHALATION) at 20:17

## 2019-01-06 RX ADMIN — POTASSIUM CHLORIDE 10 MEQ: 10 INJECTION, SOLUTION INTRAVENOUS at 14:28

## 2019-01-06 RX ADMIN — BUDESONIDE 0.5 MG: 0.5 INHALANT RESPIRATORY (INHALATION) at 08:00

## 2019-01-06 RX ADMIN — IPRATROPIUM BROMIDE AND ALBUTEROL SULFATE 3 ML: 2.5; .5 SOLUTION RESPIRATORY (INHALATION) at 08:00

## 2019-01-06 RX ADMIN — INSULIN HUMAN 2 UNITS: 100 INJECTION, SOLUTION PARENTERAL at 06:26

## 2019-01-06 RX ADMIN — HYDROMORPHONE HYDROCHLORIDE 0.5 MG: 1 INJECTION, SOLUTION INTRAMUSCULAR; INTRAVENOUS; SUBCUTANEOUS at 14:38

## 2019-01-06 RX ADMIN — CALCIUM GLUCONATE: 94 INJECTION, SOLUTION INTRAVENOUS at 18:18

## 2019-01-06 RX ADMIN — IPRATROPIUM BROMIDE AND ALBUTEROL SULFATE 3 ML: 2.5; .5 SOLUTION RESPIRATORY (INHALATION) at 15:58

## 2019-01-06 RX ADMIN — VANCOMYCIN HYDROCHLORIDE 1000 MG: 1 INJECTION, SOLUTION INTRAVENOUS at 08:10

## 2019-01-06 RX ADMIN — HYDROMORPHONE HYDROCHLORIDE 0.5 MG: 1 INJECTION, SOLUTION INTRAMUSCULAR; INTRAVENOUS; SUBCUTANEOUS at 22:44

## 2019-01-06 RX ADMIN — VANCOMYCIN HYDROCHLORIDE 1000 MG: 1 INJECTION, SOLUTION INTRAVENOUS at 20:17

## 2019-01-06 RX ADMIN — POTASSIUM CHLORIDE 10 MEQ: 10 INJECTION, SOLUTION INTRAVENOUS at 16:33

## 2019-01-06 RX ADMIN — IPRATROPIUM BROMIDE AND ALBUTEROL SULFATE 3 ML: 2.5; .5 SOLUTION RESPIRATORY (INHALATION) at 20:17

## 2019-01-06 RX ADMIN — MICAFUNGIN SODIUM 100 MG: 20 INJECTION, POWDER, LYOPHILIZED, FOR SOLUTION INTRAVENOUS at 20:17

## 2019-01-06 RX ADMIN — ONDANSETRON 4 MG: 2 INJECTION INTRAMUSCULAR; INTRAVENOUS at 14:38

## 2019-01-06 RX ADMIN — POTASSIUM CHLORIDE 10 MEQ: 10 INJECTION, SOLUTION INTRAVENOUS at 12:04

## 2019-01-06 RX ADMIN — MEROPENEM 1 G: 1 INJECTION, POWDER, FOR SOLUTION INTRAVENOUS at 16:33

## 2019-01-06 RX ADMIN — SODIUM CHLORIDE 500 ML: 9 INJECTION, SOLUTION INTRAVENOUS at 00:27

## 2019-01-06 RX ADMIN — INSULIN HUMAN 2 UNITS: 100 INJECTION, SOLUTION PARENTERAL at 12:04

## 2019-01-06 NOTE — PROGRESS NOTES
"Pharmacy Consult-Vancomycin Dosing  Todd Phillips is a  68 y.o. male receiving vancomycin therapy. Patient had a planned Whipple procedure on 12/31 to remove a locally advanced pancreatic cancer tumor. Patient's stay has been complicated by post-op confusion and somnolence likely due to over-sedation. Patient developed leukocytosis on 1/4.      Indication: Intra-abdominal infection  Consulting Provider: Dr. Fermin Cabrera  ID Consult: No    Goal Trough: 10-15 mcg/mL    Current Antimicrobial Therapy    Pharmacy to Dose Vancomycin  1/5  Meropenem 500 mg every 8 hours 1/5  Micafungin 100 mg every 24 hours 1/5    Previous Antimicrobial Therapy  Zosyn 3.375g every 8 hours 1/1 to 1/5      Allergies  Allergies as of 12/19/2018 - Reviewed 12/19/2018   Allergen Reaction Noted   • Contrast dye Other (See Comments) 08/22/2018   • Chlorhexidine Hives and Rash 12/19/2018       Labs    Results from last 7 days   Lab Units  01/05/19   1759 01/05/19   0132  01/04/19   0738   BUN mg/dL  35*  31*  29*   CREATININE mg/dL  1.22  1.13  1.12       Results from last 7 days   Lab Units  01/05/19   1759 01/05/19   0132  01/04/19   0737   WBC 10*3/mm3  22.42*  14.98*  11.09*       Evaluation of Dosing     Last Dose Received in the ED/Outside Facility: None    Ht - 182.9 cm (72\")  Wt - 91.5 kg (201 lb 12.8 oz)    Estimated Creatinine Clearance: 72.9 mL/min (by C-G formula based on SCr of 1.22 mg/dL).    Intake & Output (last 3 days)         01/03 0701 - 01/04 0700 01/04 0701 - 01/05 0700 01/05 0701 - 01/06 0700    P.O. 20 250 0    I.V. (mL/kg)   2000 (21.9)    Other 40 583 186    NG/ 922 541    IV Piggyback  50 50    Total Intake(mL/kg) 640 (7) 1805 (19.7) 2777 (30.3)    Urine (mL/kg/hr) 0 (0) 0 (0) 0 (0)    Emesis/NG output 60      Drains 80 65 50    Stool  0 0    Total Output 140 65 50    Net +500 +1740 +2727           Urine Unmeasured Occurrence 2 x 2 x 2 x    Stool Unmeasured Occurrence  2 x 3 x            Microbiology and " Radiology  Microbiology Results (last 10 days)       ** No results found for the last 240 hours. **            Evaluation of Level    Lab Results   Component Value Date    [facility]       Assessment/Plan:    1. Vancomycin for intra-abdominal infection with goal trough 10-15mcg/mL.   2. Patient was loaded with 2250mg (24.5mg/kg) vancomycin on 1/5 around 2000.   2. Current maintenance dose of 1000mg IV (10.9 mg/kg) every 12 hours based on age, renal function and indication starting 1/6 at 0800.   3. Trough to be drawn prior to fourth dose on 1/7 at 0730.   4. Monitor renal function, cultures and sensitivities, and clinical status, and adjust regimen as necessary. Pharmacy will continue to follow.    Thanks,  Levi Bustos, PharmD  Pharmacy Resident  1/5/2019  7:56 PM

## 2019-01-06 NOTE — PROGRESS NOTES
"General Surgery Daily Progress Note    01/06/19    Todd Phillips  [id number]  1950    6 Days Post-Op    Reason for Evaluation: Locally advanced pancreatic cancer  Subjective:  The patient developed adult necrotizing enterocolitis yesterday secondary to his tube feeding previous debilitation and malnutrition and extensive surgery.  He is responded appropriately to volume resuscitation stopping his feeding and IV fluids.  His antibiotics have been broadened appropriately especially given his multiple rounds of antibiotics for cholangitis preoperatively    Objective:  /87   Pulse 101   Temp 98.2 °F (36.8 °C) (Axillary)   Resp 24   Ht 182.9 cm (72\")   Wt 91.5 kg (201 lb 12.8 oz)   SpO2 93%   BMI 27.37 kg/m²       INTAKE/OUTPUT      Intake/Output Summary (Last 24 hours) at 1/6/2019 0900  Last data filed at 1/6/2019 0700  Gross per 24 hour   Intake 6628.9 ml   Output 1720 ml   Net 4908.9 ml       General Appearance: Chronically on appearance but in no acute distress this morning  Eyes: Anicteric  Neck: Trachea midline   Cardiovascular:  RRR  Lungs:  Bilateral respirations unlabored   Abdomen:  His abdomen is softer than yesterday but still distended it is slightly less tender  Extremities:  No cyanosis or edema   Skin:  No jaundice, dry   Neurologic: awake and conversant   Surgical Site: Incision shows no signs of infection his drain output is consistent with clear ascites which is anticipated given his aggressive resuscitation      Imaging Results (last 24 hours)     Procedure Component Value Units Date/Time    XR Chest 1 View [[id number]] Updated:  01/06/19 0704    XR Chest 1 View [[id number]] Collected:  01/05/19 1928     Updated:  01/05/19 1928    Narrative:          EXAMINATION: XR CHEST 1 VW - 1/5/2019     INDICATION:  Z01.89-Encounter for other specified special examinations;  C25.9-Malignant neoplasm of pancreas, unspecified; Z74.09-Other reduced  mobility      COMPARISON: 01/05/2019     FINDINGS: " Portable chest reveals deep line catheter on the right with  tip in the SVC. Mild increased markings at left lung base with small  left pleural effusion. Improvement seen in aeration of the lung bases in  the interval. Degenerative change is seen within the spine. Pulmonary  vascularity is within normal limits.           Impression:       Improvement seen in aeration of the lung bases in the  interval with only minimal right residual markings at the lung bases.     DICTATED:   1/5/2019  EDITED/ls :   1/5/2019           CT Abdomen Pelvis Without Contrast [[id number]] Collected:  01/05/19 1645     Updated:  01/05/19 1655    Narrative:       EXAMINATION: CT ABDOMEN/PELVIS WO CONTRAST - 1/5/2019      INDICATION:  Z01.89-Encounter for other specified special examinations;  C25.9-Malignant neoplasm of pancreas, unspecified; Z74.09-Other reduced  mobility. Abdominal pain.     TECHNIQUE: Multiple axial CT imaging is obtained of the abdomen and  pelvis without the administration of oral or intravenous contrast.     The radiation dose reduction device was turned on for each scan per the  ALARA (As Low as Reasonably Achievable) protocol.     COMPARISON: NONE     FINDINGS: There are small bilateral pleural effusions. Atelectatic  change is seen in the lung bases bilaterally. Extraluminal air is  identified throughout the upper abdomen. There is fluid seen surrounding  the spleen as well as the liver. There is a small to  moderate size  hiatal hernia. There is gastric distention. There are postoperative  changes seen from Whipple procedure. There is a small collection of  fluid identified along the leftward aspect of the liver. There is some  free fluid identified near the sarbjit hepatis. There is a G-tube  identified in satisfactory position. Kidneys and adrenal glands within  normal limits. There is gaseous distention of the small bowel loops  suggesting a postoperative ileus. There is air identified within the  mesenteric  vessels. Findings are concerning for pneumatosis of several  small bowel loops centrally within the abdomen. There is fluid seen  scattered throughout the colon. Mild distention of the colon. There is  some fluid seen within the pelvis.     Pelvis: The pelvic portion of the gastrointestinal tract is within  normal limits. Fluid seen within the colon. The bladder reveals  air-fluid level likely from prior instrumentation. No pelvic adenopathy.  Bony structures reveal no evidence of osseous abnormality.       Impression:       There are small bilateral pleural effusions. There is a  fluid collection seen surrounding the liver and spleen containing air in  the fluid collection around the liver. There is air seen scattered  throughout the abdomen which may be postoperative. There is air  identified within the mesenteric vessels with findings suggesting  pneumatosis throughout scattered small bowel loops within the mid  abdomen. Fluid throughout the colon suggesting clinical presentation of  diarrhea. Gastrostomy tube is in satisfactory position.     DICTATED:   1/5/2019  EDITED/ls :   1/5/2019      This report was finalized on 1/5/2019 4:53 PM by Dr. Tasha Guzmán MD.             Labs:  Lab Results (last 24 hours)     Procedure Component Value Units Date/Time    CBC & Differential [[id number]] Collected:  01/06/19 0506    Specimen:  Blood Updated:  01/06/19 0714    Narrative:       The following orders were created for panel order CBC & Differential.  Procedure                               Abnormality         Status                     ---------                               -----------         ------                     Manual Differential[[id number]]          Abnormal            Final result               Scan Slide[[id number]]                                       Final result               CBC Auto Differential[[id number]]        Abnormal            Final result                 Please view results for these tests  on the individual orders.    Scan Slide [[id number]] Collected:  01/06/19 0506    Specimen:  Blood Updated:  01/06/19 0714     Scan Slide --     Comment: See Manual Differential Results       Manual Differential [[id number]]  (Abnormal) Collected:  01/06/19 0506    Specimen:  Blood Updated:  01/06/19 0714     Neutrophil % 79.0 %      Lymphocyte % 1.0 %      Monocyte % 5.0 %      Eosinophil % 0.0 %      Basophil % 0.0 %      Bands %  13.0 %      Metamyelocyte % 2.0 %      Neutrophils Absolute 16.89 10*3/mm3      Lymphocytes Absolute 0.18 10*3/mm3      Monocytes Absolute 0.92 10*3/mm3      Eosinophils Absolute 0.00 10*3/mm3      Basophils Absolute 0.00 10*3/mm3      Crenated RBC's Slight/1+     WBC Morphology Normal     Platelet Morphology Normal    CBC Auto Differential [[id number]]  (Abnormal) Collected:  01/06/19 0506    Specimen:  Blood Updated:  01/06/19 0714     WBC 18.36 10*3/mm3      RBC 3.03 10*6/mm3      Hemoglobin 9.0 g/dL      Hematocrit 27.1 %      MCV 89.4 fL      MCH 29.7 pg      MCHC 33.2 g/dL      RDW 16.5 %      RDW-SD 54.0 fl      MPV 10.2 fL      Platelets 279 10*3/mm3     Blood Culture - Blood, Arm, Left [[id number]] Collected:  01/05/19 1759    Specimen:  Blood from Arm, Left Updated:  01/06/19 0700     Blood Culture No growth at less than 24 hours    Blood Culture - Blood, Arm, Left [[id number]] Collected:  01/05/19 1830    Specimen:  Blood from Arm, Left Updated:  01/06/19 0700     Blood Culture No growth at less than 24 hours    Lactic Acid, Plasma [[id number]]  (Abnormal) Collected:  01/06/19 0506    Specimen:  Blood Updated:  01/06/19 0630     Lactate 2.2 mmol/L      Comment: Falsely depressed results may occur on samples drawn from patients receiving N-Acetylcysteine (NAC) or Metamizole.       Procalcitonin [[id number]]  (Abnormal) Collected:  01/06/19 0506    Specimen:  Blood Updated:  01/06/19 0630     Procalcitonin 6.71 ng/mL     Narrative:       As a Marker for Sepsis (Non-Neonates):   1. <0.5  ng/mL represents a low risk of severe sepsis and/or septic shock.  2. >2 ng/mL represents a high risk of severe sepsis and/or septic shock.    As a Marker for Lower Respiratory Tract Infections that require antibiotic therapy:    PCT on Admission     Antibiotic Therapy       6-12 Hrs later  > 0.5                Strongly Recommended             >0.25 - <0.5         Recommended  0.1 - 0.25           Discouraged              Remeasure/reassess PCT  <0.1                 Strongly Discouraged     Remeasure/reassess PCT                     PCT values of < 0.5 ng/mL do not exclude an infection, because localized infections (without systemic signs) may be associated with such low concentrations, or a systemic infection in its initial stages (< 6 hours). Furthermore, increased PCT can occur without infection. PCT concentrations between 0.5 and 2.0 ng/mL should be interpreted taking into account the patient's history. It is recommended to retest PCT within 6-24 hours if any concentrations < 2 ng/mL are obtained.    Comprehensive Metabolic Panel [[id number]]  (Abnormal) Collected:  01/06/19 0506    Specimen:  Blood Updated:  01/06/19 0627     Glucose 179 mg/dL      BUN 36 mg/dL      Creatinine 1.14 mg/dL      Sodium 131 mmol/L      Potassium 3.3 mmol/L      Chloride 100 mmol/L      CO2 22.0 mmol/L      Calcium 8.0 mg/dL      Total Protein 4.9 g/dL      Albumin 3.00 g/dL      ALT (SGPT) 11 U/L      AST (SGOT) 18 U/L      Alkaline Phosphatase 84 U/L      Total Bilirubin 1.0 mg/dL      eGFR Non African Amer 64 mL/min/1.73      Globulin 1.9 gm/dL      A/G Ratio 1.6 g/dL      BUN/Creatinine Ratio 31.6     Anion Gap 9.0 mmol/L     Narrative:       National [employer] Guidelines    Stage     Description        GFR  1         Normal or High     90+  2         Mild decrease      60-89  3         Moderate decrease  30-59  4         Severe decrease    15-29  5         Kidney failure     <15    The MDRD GFR formula is only valid for  adults with stable renal function between ages 18 and 70.    Magnesium [[id number]]  (Normal) Collected:  01/06/19 0506    Specimen:  Blood Updated:  01/06/19 0619     Magnesium 2.1 mg/dL     Phosphorus [[id number]]  (Normal) Collected:  01/06/19 0506    Specimen:  Blood Updated:  01/06/19 0619     Phosphorus 3.4 mg/dL     POC Glucose Once [[id number]]  (Abnormal) Collected:  01/06/19 0507    Specimen:  Blood Updated:  01/06/19 0508     Glucose 177 mg/dL     Blood Gas, Arterial With Co-Ox [[id number]]  (Abnormal) Collected:  01/06/19 0350    Specimen:  Arterial Blood Updated:  01/06/19 0358     Site Left Radial     Dameon's Test N/A     pH, Arterial 7.525 pH units      Comment: 83 Value above reference range        pCO2, Arterial 24.9 mm Hg      Comment: 84 Value below reference range        pO2, Arterial 61.6 mm Hg      Comment: 84 Value below reference range        HCO3, Arterial 20.6 mmol/L      Base Excess, Arterial -1.4 mmol/L      Hemoglobin, Blood Gas 9.4 g/dL      Comment: 84 Value below reference range        Hematocrit, Blood Gas 29.0 %      Oxyhemoglobin 91.9 %      Comment: 84 Value below reference range        Methemoglobin 0.40 %      Carboxyhemoglobin 1.8 %      CO2 Content 21.3 mmol/L      Temperature 37.0 C      Barometric Pressure for Blood Gas -- mmHg      Comment: N/A        Modality Nasal Cannula     FIO2 28 %      Ventilator Mode       Comment: Meter: [id number]     :  [id number]        Note --     pH, Temp Corrected 7.525 pH Units      pCO2, Temperature Corrected 24.9 mm Hg      pO2, Temperature Corrected 61.6 mm Hg     Lactic Acid, Reflex [[id number]]  (Normal) Collected:  01/05/19 2259    Specimen:  Blood Updated:  01/05/19 2333     Lactate 2.0 mmol/L      Comment: Falsely depressed results may occur on samples drawn from patients receiving N-Acetylcysteine (NAC) or Metamizole.       Blood Culture - Blood, Arm, Right [[id number]] Collected:  01/05/19 1021    Specimen:  Blood from Arm,  Right Updated:  01/05/19 2330     Blood Culture No growth at less than 24 hours    Blood Culture - Blood, Arm, Right [[id number]] Collected:  01/05/19 1013    Specimen:  Blood from Arm, Right Updated:  01/05/19 2330     Blood Culture No growth at less than 24 hours    POC Glucose Once [[id number]]  (Abnormal) Collected:  01/05/19 2301    Specimen:  Blood Updated:  01/05/19 2303     Glucose 186 mg/dL     Lactic Acid, Reflex Timer (This will reflex a repeat order 3-3:15 hours after ordered.) [[id number]] Collected:  01/05/19 1759    Specimen:  Blood Updated:  01/05/19 2230     Extra Tube Hold for add-ons.     Comment: Auto resulted.       Blood Gas, Arterial With Co-Ox [[id number]]  (Abnormal) Collected:  01/05/19 2131    Specimen:  Arterial Blood Updated:  01/05/19 2138     Site Right Radial     Dameon's Test N/A     pH, Arterial 7.500 pH units      Comment: 83 Value above reference range        pCO2, Arterial 25.2 mm Hg      Comment: 84 Value below reference range        pO2, Arterial 61.5 mm Hg      Comment: 84 Value below reference range        HCO3, Arterial 19.6 mmol/L      Base Excess, Arterial -2.7 mmol/L      Hemoglobin, Blood Gas 9.2 g/dL      Comment: 84 Value below reference range        Hematocrit, Blood Gas 28.2 %      Oxyhemoglobin 91.4 %      Comment: 84 Value below reference range        Methemoglobin 0.30 %      Carboxyhemoglobin 1.8 %      CO2 Content 20.4 mmol/L      Temperature 37.0 C      Barometric Pressure for Blood Gas -- mmHg      Comment: N/A        Modality Nasal Cannula     FIO2 28 %      Ventilator Mode       Comment: Meter: [id number]     :  [id number]        Note --     pH, Temp Corrected 7.500 pH Units      pCO2, Temperature Corrected 25.2 mm Hg      pO2, Temperature Corrected 61.5 mm Hg     Lactic Acid, Plasma [[id number]]  (Abnormal) Collected:  01/05/19 2103    Specimen:  Blood Updated:  01/05/19 2128     Lactate 2.8 mmol/L      Comment: Falsely depressed results may occur on  samples drawn from patients receiving N-Acetylcysteine (NAC) or Metamizole.       Lactic Acid, Plasma [[id number]]  (Abnormal) Collected:  01/05/19 1759    Specimen:  Blood Updated:  01/05/19 1924     Lactate 3.8 mmol/L      Comment: Falsely depressed results may occur on samples drawn from patients receiving N-Acetylcysteine (NAC) or Metamizole.       Procalcitonin [[id number]]  (Normal) Collected:  01/05/19 1830    Specimen:  Blood Updated:  01/05/19 1918     Procalcitonin <0.05 ng/mL     Narrative:       As a Marker for Sepsis (Non-Neonates):   1. <0.5 ng/mL represents a low risk of severe sepsis and/or septic shock.  2. >2 ng/mL represents a high risk of severe sepsis and/or septic shock.    As a Marker for Lower Respiratory Tract Infections that require antibiotic therapy:    PCT on Admission     Antibiotic Therapy       6-12 Hrs later  > 0.5                Strongly Recommended             >0.25 - <0.5         Recommended  0.1 - 0.25           Discouraged              Remeasure/reassess PCT  <0.1                 Strongly Discouraged     Remeasure/reassess PCT                     PCT values of < 0.5 ng/mL do not exclude an infection, because localized infections (without systemic signs) may be associated with such low concentrations, or a systemic infection in its initial stages (< 6 hours). Furthermore, increased PCT can occur without infection. PCT concentrations between 0.5 and 2.0 ng/mL should be interpreted taking into account the patient's history. It is recommended to retest PCT within 6-24 hours if any concentrations < 2 ng/mL are obtained.    Basic Metabolic Panel [[id number]]  (Abnormal) Collected:  01/05/19 1759    Specimen:  Blood Updated:  01/05/19 1854     Glucose 235 mg/dL      BUN 35 mg/dL      Creatinine 1.22 mg/dL      Sodium 128 mmol/L      Potassium 3.5 mmol/L      Chloride 100 mmol/L      CO2 16.0 mmol/L      Calcium 8.2 mg/dL      eGFR Non African Amer 59 mL/min/1.73      BUN/Creatinine Ratio  28.7     Anion Gap 12.0 mmol/L     Narrative:       National Kidney Foundation Guidelines    Stage     Description        GFR  1         Normal or High     90+  2         Mild decrease      60-89  3         Moderate decrease  30-59  4         Severe decrease    15-29  5         Kidney failure     <15    The MDRD GFR formula is only valid for adults with stable renal function between ages 18 and 70.    POC Glucose Once [[id number]]  (Abnormal) Collected:  01/05/19 1850    Specimen:  Blood Updated:  01/05/19 1853     Glucose 220 mg/dL     Blood Gas, Arterial With Co-Ox [[id number]]  (Abnormal) Collected:  01/05/19 1838    Specimen:  Arterial Blood Updated:  01/05/19 1845     Site Right Radial     Dameon's Test N/A     pH, Arterial 7.466 pH units      Comment: 83 Value above reference range        pCO2, Arterial 20.9 mm Hg      Comment: 84 Value below reference range        pO2, Arterial 57.8 mm Hg      Comment: 84 Value below reference range        HCO3, Arterial 15.1 mmol/L      Base Excess, Arterial -7.1 mmol/L      Hemoglobin, Blood Gas 9.8 g/dL      Comment: 84 Value below reference range        Hematocrit, Blood Gas 30.2 %      Oxyhemoglobin 89.6 %      Comment: 84 Value below reference range        Methemoglobin 0.40 %      Carboxyhemoglobin 1.7 %      CO2 Content 15.7 mmol/L      Temperature 37.0 C      Barometric Pressure for Blood Gas -- mmHg      Comment: N/A        Modality Nasal Cannula     FIO2 28 %      Ventilator Mode       Comment: Meter: [id number]     :  [id number]        Note --     pH, Temp Corrected 7.466 pH Units      pCO2, Temperature Corrected 20.9 mm Hg      pO2, Temperature Corrected 57.8 mm Hg     CBC (No Diff) [[id number]]  (Abnormal) Collected:  01/05/19 1759    Specimen:  Blood Updated:  01/05/19 1819     WBC 22.42 10*3/mm3      RBC 3.29 10*6/mm3      Hemoglobin 9.8 g/dL      Hematocrit 29.8 %      MCV 90.6 fL      MCH 29.8 pg      MCHC 32.9 g/dL      RDW 16.6 %      RDW-SD 54.9  fl      MPV 10.0 fL      Platelets 336 10*3/mm3     POC Glucose Once [[id number]]  (Abnormal) Collected:  01/05/19 1630    Specimen:  Blood Updated:  01/05/19 1632     Glucose 212 mg/dL     POC Glucose Once [[id number]]  (Abnormal) Collected:  01/05/19 1129    Specimen:  Blood Updated:  01/05/19 1131     Glucose 311 mg/dL             Assessment:  Adult necrotizing enterocolitis.  This again occasionally occur after upper GI extensive cancer surgery and typically is seen in the setting of jejunal tube feeding.  At this point we'll no longer uses J-tube for feeds.  He needs to return to TPN therapy which she was on for a month preoperatively.  I completely agree with broadening his antibiotics given his previous antibiotic exposure.  Would leave his G-tube to gravity and remove his MADDY drain in place an ostomy appliance over the site allowed to decompress and that matter.  He has acute on chronic renal insufficiency with a baseline creatinine of 1.2 and therefore I would anticipate marginal urine output regardless.  If his acidosis remains resolved for the next 24-36 hours I think gentle diuresis could be considered at that point but not at this time    Plan:   Continue bowel rest, broad-spectrum antibiotics  Nothing per J-tube or G-tube today.  G-tube to gravity J-tube is not to be used  Would do DVT prophylaxis dosing currently do not restart Eliquis until patient is near discharge.  If stabilizes therapeutic anticoagulation can be provided with Lovenox.  Continue ICU stay in close monitoring  With tolerating marginal urine output given chronic renal insufficiency    Miquel Brody MD - 1/6/2019, 9:00 AM

## 2019-01-06 NOTE — PLAN OF CARE
Problem: Patient Care Overview  Goal: Plan of Care Review  Outcome: Ongoing (interventions implemented as appropriate)   01/06/19 0653   OTHER   Outcome Summary Pt. VSS, HR 's, No fever noted, MADDY drain had 1,145 out of serosanguinous drainage, Dr. Huertas notified. Remains tachypic >24 breaths per minute. One 500ml bolus was given due to low UOP, did increase after intervention. Will continue to monitor.   Coping/Psychosocial   Plan of Care Reviewed With patient;family;spouse   Plan of Care Review   Progress declining       Problem: Fall Risk (Adult)  Goal: Absence of Fall  Outcome: Ongoing (interventions implemented as appropriate)   01/06/19 0653   Fall Risk (Adult)   Absence of Fall achieves outcome       Problem: Skin Injury Risk (Adult)  Goal: Skin Health and Integrity  Outcome: Ongoing (interventions implemented as appropriate)   01/06/19 0653   Skin Injury Risk (Adult)   Skin Health and Integrity making progress toward outcome       Problem: Pain, Acute (Adult)  Goal: Acceptable Pain Control/Comfort Level  Outcome: Ongoing (interventions implemented as appropriate)   01/06/19 0653   Pain, Acute (Adult)   Acceptable Pain Control/Comfort Level making progress toward outcome

## 2019-01-06 NOTE — PROGRESS NOTES
Pharmacokinetic Consult - Meropenem Dosing  Todd Phillips is a 68 y.o. male who has been consulted to dose meropenem for intra-abdominal infection .    Current Antimicrobial Therapy     Pharmacy to Dose Vancomycin  1/5  Meropenem 500 mg every 8 hours 1/5  Micafungin 100 mg every 24 hours 1/5     Previous Antimicrobial Therapy  Zosyn 3.375g every 8 hours 1/1 to 1/5    Allergies  Contrast dye; Fentanyl; and Chlorhexidine    Relevant clinical data and objective history reviewed:  Creatinine   Date Value Ref Range Status   01/05/2019 1.22 0.60 - 1.30 mg/dL Final     Estimated Creatinine Clearance: 72.9 mL/min (by C-G formula based on SCr of 1.22 mg/dL).  I/O last 3 completed shifts:  In: 4582 [P.O.:250; I.V.:2000; Other:769; NG/GT:1463; IV Piggyback:100]  Out: 115 [Drains:115]  Patient weight: 91.5 kg (201 lb 12.8 oz)    Asessment/Plan  1. Initiate meropenem with a 1g loading dose and then start 500mg every 8 hours per the extended infusion protocol.   2. Pharmacy will monitor Mr. Phillips's renal function and clinical status and adjust the meropenem dose and/or frequency as needed.    Thanks,  Levi Bustos, PharmD  Pharmacy Resident  1/5/2019  8:06 PM

## 2019-01-06 NOTE — CONSULTS
"                  Clinical Nutrition     Nutrition Support Assessment  Reason for Visit:   Physician consult, PN    Patient Name: Todd Phillips  YOB: 1950  MRN: [id number]  Date of Encounter: 01/06/19 1:15 PM  Admission date: 12/31/2018    Comments: patient made NPO, consult to start TPN. Dicussed with pharmacist.    TPN recommendations: 18% dextrose, 7.5% AA @ goal rate 75ml/hr. SMOF lipids 200ml 20% daily.     Nutrition Assessment     Hospital Problem List    Malignant neoplasm of head of pancreas (CMS/[facility])    (12/31) S/P Whipple with gastrojejunostomy tube placement    (1/6) adult necrotizing enterocolitis     PMH    Hyperlipidemia    COPD (chronic obstructive pulmonary disease) (CMS/[facility])    Essential hypertension    Gastroesophageal reflux disease without esophagitis    DM2 (diabetes mellitus, type 2) (CMS/[facility])    Paroxysmal atrial fibrillation    Chronic anticoagulation, Eliquis    Post-operative confusion and somnolence, likely due to oversedation    Atrial fibrillation with RVR (CMS/[facility])    Other nutrition related factors:  · Home TPN for one prior to current hospital admission.  Per RD note patient was on 20% dextrose, 5% AA @ goal rate 80ml/hr.  Lipids 250ml 20% 3x/week (which would provided 1904kcals, 96grms PRO).  Patient with 6# weight gain while on TPN.  · (12/31)J-tube placement 12/31  · (1/2) Peptamen TF started   · (1/6) patient made NPO and TF D/Damaso due to adult necrotizing enterocolitis. G-tube to drain.  PICC line to start TPN For nutrition support     Reported/Observed/Food/Nutrition Related History:     Pt sleeping at time of visit.  Son at bedside and reports PICC just placed a couple or hours ago.  Aware of plans to start TPN. Not questions for RD at this time.   Anthropometrics     Height: 182.9 cm (72\")  Last filed wt: Weight: (ht=72in, qu=195pj (per EPIC; method unknown); BMI=27) (01/01/19 1523)  BMI: BMI (Calculated): 27.4  Overweight: 25.0-29.9kg/m2     Ideal Body Weight " "(IBW) (kg): 82.07  Admission wt:201 lb 12.8 oz--no method recorded    wife reports pt has gained 6lb during the past month, while he was receiving PN.  Wife gives wt hx that pt lost 40lb unintentionally from Aug-Nov 2018 and now pt has regained 6lb of that wt lost (during Dec 2018)    Labs reviewed     Results from last 7 days   Lab Units  01/06/19   0506  01/05/19   1759  01/05/19   0132  01/03/19   0639  12/31/18   1343   SODIUM mmol/L  131*  128*  132  139  130*   POTASSIUM mmol/L  3.3*  3.5  3.8  3.8  3.4*  5.3   CHLORIDE mmol/L  100  100  103  107  102   CO2 mmol/L  22.0  16.0*  20.0  24.0  21.0   BUN mg/dL  36*  35*  31*  25*  23   CREATININE mg/dL  1.14  1.22  1.13  0.95  1.20   GLUCOSE mg/dL  179*  235*  233*  207*  239*   CALCIUM mg/dL  8.0*  8.2*  8.1*  8.0*  8.4*   PHOSPHORUS mg/dL  3.4   --    --   2.3*  3.7    < > = values in this interval not displayed.     Medications reviewed   Protonix, SSI,   LR @ 75ml/hr     Intake/Ouptut 24 hrs (7:00AM - 6:59 AM)     Intake & Output (last day)       01/05 0701 - 01/06 0700 01/06 0701 - 01/07 0700    P.O. 0     I.V. (mL/kg) 5552.1 (60.7) 187 (2)    Other 186     NG/     IV Piggyback 349.8 13    Total Intake(mL/kg) 6628.9 (72.4) 200 (2.2)    Urine (mL/kg/hr) 525 (0.2) 75 (0.1)    Drains 1195 200    Stool 0     Total Output 1720 275    Net +4908.9 -75          Urine Unmeasured Occurrence 2 x     Stool Unmeasured Occurrence 3 x         Needs Assessment     Height used 72\"   Weight used Actual wt 201 lb     lbs     Estimated need Method/Equation used Result    Energy/Calorie need   2100 kcals/d 25 kcal/actual= 2288 kcal  MSJ= 1713 x 1.2= 2057    Protein   137 g/day 1.5grms/kg CBW =137      Current Nutrition Prescription     PO: NPO Diet  Snack: Chewing gum x30 minutes three times daily to increase saliva flow and provide gas pain relief. Do not give to ileostomy patients.    Nutrition Diagnosis   12/31/2018  Problem Altered GI function   Etiology S/p " Whipple, GJ tube placed   Signs/Symptoms Prior PN need for EN via J-tube   Status- NPO, plan to restart TPN   Revised 1/6    Problem Inadequate energy intake   Etiology Pancreatic CA, altered GI function   Signs/Symptoms Poor PO intake; did not tolerate TF via Jtube        Nutrition Intervention   1.  Follow treatment progress, Care plan reviewed, Nutrition support order placed  TPN recommendations: 18% dextrose, 7.5% AA @ goal rate 75ml/hr. SMOF lipids 200ml 20% daily.      At Target Goal Volume     % Est needs   Volume  1800ml     Energy/kcals 2042 kcals 100%   Protein 135g pro 100%       Goal:   General: Nutrition support treatment  PO: initiate po when medically appropriate  EN/PN: Initiate PN      Monitoring/Evaluation:   Per protocol, I&O, Pertinent labs, Weight, GI status, Symptoms      Will Continue to follow per protocol      Margret Lindsay RD  Time Spent: 60 min

## 2019-01-06 NOTE — SIGNIFICANT NOTE
Notified by nursing of possible change in mental status. Patient has been observed with poor orientation, continuous iteration of words, and unilateral change in  strength. I have ordered a CT of the head as well as NIH evaluation for further evaluation and monitoring.     VIMAL An, ACNP-BC  Pulmonary and Critical Care Service

## 2019-01-06 NOTE — PROGRESS NOTES
INTENSIVIST / PULMONARY FOLLOW UP NOTE     Hospital:  LOS: 6 days   Mr. Todd Phillips, 68 y.o. male is followed for:     Malignant neoplasm of head of pancreas (CMS/HCC)    Hyperlipidemia    COPD (chronic obstructive pulmonary disease) (CMS/HCC)    Essential hypertension    Gastroesophageal reflux disease without esophagitis    DM2 (diabetes mellitus, type 2) (CMS/HCC)    Paroxysmal atrial fibrillation    Chronic anticoagulation, Eliquis    Post-operative confusion and somnolence, likely due to oversedation    Atrial fibrillation with RVR (CMS/[facility])          SUBJECTIVE   Acidosis, perfusion and breathing pattern improved overnight, abdomen less distended    The patient's relevant past medical, surgical, family, and social history were reviewed    Allergies and medications were reviewed    ROS:  Per subjective, all other systems were reviewed and were negative        OBJECTIVE     Vital Sign Min/Max for last 24 hours:  Temp  Min: 97.7 °F (36.5 °C)  Max: 99.4 °F (37.4 °C)   BP  Min: 103/76  Max: 155/98   Pulse  Min: 91  Max: 109   Resp  Min: 22  Max: 32   SpO2  Min: 91 %  Max: 98 %   No Data Recorded     Physical Exam:  General Appearance:  Conversant, in no acute distress  Eyes:  No scleral icterus or pallor, pupils normal  Ears, Nose, Mouth, Throat:  Atraumatic, oropharynx clear  Neck:  Trachea midline, thyroid normal  Respiratory:  Clear to auscultation bilaterally, no tenderness to palpation  Cardiovascular:  Regular rate and rhythm, no murmurs, no peripheral edema, no thrill  Gastrointestinal:  Mildly Distended, tender, incisions C/D/I  Skin:  Normal temperature, no rash  Psychiatric:  Alert, no agitation  Neuro:  No new focal neurologic deficits observed    Telemetry:              Hemodynamics:   CVP:     PAP:     PAOP:     CO:     CI:     SVI:     SVR:       SpO2: 96 % SpO2  Min: 91 %  Max: 98 %   Device:      Flow Rate:   No Data Recorded     Mechanical Ventilator Settings:                                          Intake/Ouptut 24 hrs (7:00AM - 6:59 AM)  Intake & Output (last 3 days)       01/03 0701 - 01/04 0700 01/04 0701 - 01/05 0700 01/05 0701 - 01/06 0700 01/06 0701 - 01/07 0700    P.O. 20 250 0     I.V. (mL/kg)   5552.1 (60.7) 187 (2)    Other 40 583 186     NG/ 922 541     IV Piggyback  50 349.8 13    Total Intake(mL/kg) 640 (7) 1805 (19.7) 6628.9 (72.4) 200 (2.2)    Urine (mL/kg/hr) 0 (0) 0 (0) 525 (0.2) 75 (0.2)    Emesis/NG output 60       Drains 80 65 1195 200    Stool  0 0     Total Output 872 45 5765 275    Net +500 +1740 +4908.9 -75            Urine Unmeasured Occurrence 2 x 2 x 2 x     Stool Unmeasured Occurrence  2 x 3 x           Lines, Drains & Airways    Active LDAs     Name:   Placement date:   Placement time:   Site:   Days:    Closed/Suction Drain 1 Abdomen Bulb 19 Fr.   --    --    Abdomen       Gastrostomy/Enterostomy Gastrostomy-jejunostomy 2 22 Fr.   --    --    --       Single Lumen Implantable Port Right Subclavian   --    --    Subclavian                   Hematology:  Results from last 7 days   Lab Units  01/06/19   0506  01/05/19   1759  01/05/19   0132  01/04/19   0737  01/03/19   0639  01/02/19   0609  01/01/19   0542   WBC 10*3/mm3  18.36*  22.42*  14.98*  11.09*  8.19  9.27  12.54*   HEMOGLOBIN g/dL  9.0*  9.8*  9.0*  8.8*  8.1*  8.2*  9.9*   HEMATOCRIT %  27.1*  29.8*  27.6*  27.0*  24.8*  24.8*  30.1*   PLATELETS 10*3/mm3  279  336  279  228  227  202  232   MONOCYTES % %  5.0   --    --    --    --    --    --      Electrolytes, Magnesium and Phosphorus:  Results from last 7 days   Lab Units  01/06/19   0506  01/05/19   1759  01/05/19   0132  01/04/19   0738  01/03/19   0639  01/02/19   0609  01/01/19   0542  12/31/18   1343   SODIUM mmol/L  131*  128*  132  133  139  138  133  130*   CHLORIDE mmol/L  100  100  103  100  107  106  101  102   POTASSIUM mmol/L  3.3*  3.5  3.8  3.8  3.3*  3.4*  3.5  4.2  5.3   CO2 mmol/L  22.0  16.0*  20.0  26.0  24.0  23.0  22.0  21.0   MAGNESIUM  mg/dL  2.1   --   2.3  1.8  1.9  1.9  1.6  1.7   PHOSPHORUS mg/dL  3.4   --    --    --   2.3*   --    --   3.7     Renal:  Results from last 7 days   Lab Units  01/06/19   0506  01/05/19   1759  01/05/19   0132  01/04/19   0738  01/03/19   0639  01/02/19   0609  01/01/19   0542   CREATININE mg/dL  1.14  1.22  1.13  1.12  0.95  0.92  1.03   BUN mg/dL  36*  35*  31*  29*  25*  22  22     Estimated Creatinine Clearance: 78 mL/min (by C-G formula based on SCr of 1.14 mg/dL).  Hepatic:  Results from last 7 days   Lab Units  01/06/19   0506  01/03/19   0639  12/31/18   1343   ALK PHOS U/L  84  59  78   BILIRUBIN mg/dL  1.0  0.7  0.7   ALT (SGPT) U/L  11  21  28   AST (SGOT) U/L  18  15  47*     Arterial Blood Gases:  Results from last 7 days   Lab Units  01/06/19   0350  01/05/19   2131  01/05/19   1838  12/31/18   1548  12/31/18   1355   PH, ARTERIAL pH units  7.525*  7.500*  7.466*  7.448  7.421   PCO2, ARTERIAL mm Hg  24.9  25.2  20.9  33.4  32.8   PO2 ART mm Hg  61.6*  61.5*  57.8*  62.8*  75.0*   FIO2 %  28  28  28  21  32       Results from last 7 days   Lab Units  01/01/19   0542   HEMOGLOBIN A1C %  6.10*       Lab Results   Component Value Date    LACTATE 2.2 (C) 01/06/2019       Relevant imaging studies and labs from 01/06/19 were reviewed and interpreted by me    Medications (drips):    lactated ringers Last Rate: 75 mL/hr (01/06/19 0700)   Pharmacy Consult    Pharmacy to dose vancomycin          [START ON 1/7/2019] Pharmacy Consult  Does not apply Once   budesonide 0.5 mg Nebulization BID - RT   heparin (porcine) 5,000 Units Subcutaneous Q8H   insulin regular 0-9 Units Subcutaneous Q6H   ipratropium-albuterol 3 mL Nebulization 4x Daily - RT   meropenem 500 mg Intravenous Q8H   micafungin (MYCAMINE)  mg Intravenous Q24H   pantoprazole 40 mg Intravenous Q AM   Scopolamine 1 patch Transdermal Q72H   vancomycin 1,000 mg Intravenous Q12H       Assessment/Plan   IMPRESSION / PLAN     Inpatient Problem  List:  68 y.o.male:  Active Hospital Problems    Diagnosis   • **Malignant neoplasm of head of pancreas (CMS/HCC)     Added automatically from request for surgery [id number]     • Atrial fibrillation with RVR (CMS/HCC)   • Post-operative confusion and somnolence, likely due to oversedation   • Chronic anticoagulation, Eliquis   • Paroxysmal atrial fibrillation   • DM2 (diabetes mellitus, type 2) (CMS/HCC)   • Gastroesophageal reflux disease without esophagitis   • COPD (chronic obstructive pulmonary disease) (CMS/HCC)   • Hyperlipidemia   • Essential hypertension        Impression:  68 y.o.male with relevant PMH of T2DM, CKD 3, GERD, Paroxysmal Afib on AC, locally advanced pancreatic cancer treated in a neoadjuvant fashion, complicated by recurrent bouts of cholangitis, outpatient TPN use admitted 12/31/2018 post op Pancreaticoduodenectomy (Whipple), wedge biopsy of liver, portal vein resection and lateral repair, and GJ tube by Dr. Rivas on 12/31.    Transferred to ICU due to concerns of increasing respiratory difficulty, enceophalopathy, and findings of protal venous gas, pneumatosis, and ileus.  Felt to have developed adult necrotizing enterocolitis after major abdominal surgery and initiation of tube feeds.    Plan:  Post op care / Nutrition - per surgery, will resume TPN after PICC placed    Sepsis / Adult Necrotizing Enterocolitis - NPO, continue to decompress abdomen through G tube.  Continue broad spectrum abx.  Has seen ID in past with recurrent cholangitis.  Will ask their assistance tomorrow since he is likely to need a protracted course of IV broad spectrum abx.  Cut down IVF once TPN started.    Afib - prn IV metop, resume AC when ok from surgical standpoint    NPO    TPN    DVT/GI prophylaxis    Plan of care and goals reviewed with mulitdisciplinary team at daily rounds    Critical Care time spent in direct patient care: 30 minutes (excluding procedure time, if applicable) including high complexity  decision making to assess, manipulate, and support vital organ system failure in this individual who has impairment of one or more vital organ systems such that there is a high probability of imminent or life threatening deterioration in the patient’s condition.       Fermin Cabrera MD  Intensive Care Medicine  01/06/19 12:23 PM

## 2019-01-06 NOTE — PROGRESS NOTES
Pharmacy Parenteral Nutrition Evaluation  Todd Phillips is a  68 y.o. male receiving TPN.     Indication: Necrotizing enterocolitis  Consulting Physician:  Dr Brody    Labs  Results from last 7 days   Lab Units  01/06/19   0506 01/05/19 1759 01/05/19 0132 01/03/19   0639   12/31/18   1343   SODIUM mmol/L  131*  128*  132   < >  139   < >  130*   POTASSIUM mmol/L  3.3*  3.5  3.8  3.8   < >  3.4*   < >  5.3   CHLORIDE mmol/L  100  100  103   < >  107   < >  102   CO2 mmol/L  22.0  16.0*  20.0   < >  24.0   < >  21.0   BUN mg/dL  36*  35*  31*   < >  25*   < >  23   CREATININE mg/dL  1.14  1.22  1.13   < >  0.95   < >  1.20   CALCIUM mg/dL  8.0*  8.2*  8.1*   < >  8.0*   < >  8.4*   BILIRUBIN mg/dL  1.0   --    --    --   0.7   --   0.7   ALK PHOS U/L  84   --    --    --   59   --   78   ALT (SGPT) U/L  11   --    --    --   21   --   28   AST (SGOT) U/L  18   --    --    --   15   --   47*   GLUCOSE mg/dL  179*  235*  233*   < >  207*   < >  239*    < > = values in this interval not displayed.     Results from last 7 days   Lab Units  01/06/19   0506 01/05/19 0132 01/04/19 0738  01/03/19 0639   12/31/18   1343   MAGNESIUM mg/dL  2.1  2.3  1.8  1.9   < >  1.7   PHOSPHORUS mg/dL  3.4   --    --   2.3*   --   3.7    < > = values in this interval not displayed.     Results from last 7 days   Lab Units  01/06/19   0506 01/05/19 1759 01/05/19 0132   WBC 10*3/mm3  18.36*  22.42*  14.98*   HEMOGLOBIN g/dL  9.0*  9.8*  9.0*   HEMATOCRIT %  27.1*  29.8*  27.6*   PLATELETS 10*3/mm3  279  336  279     Triglycerides   Date Value Ref Range Status   01/03/2019 77 0 - 150 mg/dL Final     estimated creatinine clearance is 78 mL/min (by C-G formula based on SCr of 1.14 mg/dL).    Intake & Output (last 3 days)         01/03 0701 - 01/04 0700 01/04 0701 - 01/05 0700 01/05 0701 - 01/06 0700 01/06 0701 - 01/07 0700    P.O. 20 250 0     I.V. (mL/kg)   5552.1 (60.7) 187 (2)    Other 40 583 186     NG/ 922 541      IV Piggyback  50 349.8 13    Total Intake(mL/kg) 640 (7) 1805 (19.7) 6628.9 (72.4) 200 (2.2)    Urine (mL/kg/hr) 0 (0) 0 (0) 525 (0.2) 75 (0.1)    Emesis/NG output 60       Drains 80 65 1195 200    Stool  0 0     Total Output 549 30 6134 275    Net +500 +1740 +4908.9 -75            Urine Unmeasured Occurrence 2 x 2 x 2 x     Stool Unmeasured Occurrence  2 x 3 x             Dietitian Recommendations     Current TPN Regimen Recommendation:  Dextrose 18% / Amino Acid 7% at goal rate of 75mL/hr.  20% SMOF Lipid Emulsion 200mL every 24 hours.    Assessment/Plan:  Initiate TPN with standard electrolytes at a rate of 25 ml/hr for 8 hours then increase to 50 ml/hr for 8 hours then increase to goal rate of 75 ml/hr.    Thanks,   Aletha Alvarez PharmD BCPS

## 2019-01-06 NOTE — NURSING NOTE
Upon receiving report on this patient the nurse on the floor stated that the physician wanted an NG tube placed and placed an order for NG tube placement. MD put in order for J tube to be put to gravity. NG tube was placed prior to seeing order for J tube to gravity. Spoke with Dr. Huertas on the phone that the NG tube was already placed and she said that was fine and that we can leave the ng to low intermittent suction at this time.

## 2019-01-07 PROBLEM — G93.40 ENCEPHALOPATHY: Status: ACTIVE | Noted: 2019-01-07

## 2019-01-07 PROBLEM — K55.30: Status: ACTIVE | Noted: 2019-01-07

## 2019-01-07 LAB
ALBUMIN SERPL-MCNC: 2.61 G/DL (ref 3.2–4.8)
ALP SERPL-CCNC: 117 U/L (ref 25–100)
ALT SERPL W P-5'-P-CCNC: 24 U/L (ref 7–40)
ANION GAP SERPL CALCULATED.3IONS-SCNC: 8 MMOL/L (ref 3–11)
AST SERPL-CCNC: 41 U/L (ref 0–33)
BASOPHILS # BLD AUTO: 0.08 10*3/MM3 (ref 0–0.2)
BASOPHILS NFR BLD AUTO: 0.3 % (ref 0–1)
BILIRUB SERPL-MCNC: 0.8 MG/DL (ref 0.3–1.2)
BUN BLD-MCNC: 33 MG/DL (ref 9–23)
CA-I SERPL ISE-MCNC: 1.25 MMOL/L (ref 1.12–1.32)
CALCIUM SPEC-SCNC: 7.7 MG/DL (ref 8.7–10.4)
CHLORIDE SERPL-SCNC: 104 MMOL/L (ref 99–109)
CHOLEST SERPL-MCNC: 51 MG/DL (ref 0–200)
CO2 SERPL-SCNC: 21 MMOL/L (ref 20–31)
CREAT BLD-MCNC: 0.93 MG/DL (ref 0.6–1.3)
D-LACTATE SERPL-SCNC: 2 MMOL/L (ref 0.5–2)
DEPRECATED RDW RBC AUTO: 51.9 FL (ref 37–54)
EOSINOPHIL # BLD AUTO: 0.01 10*3/MM3 (ref 0–0.3)
EOSINOPHIL NFR BLD AUTO: 0 % (ref 0–3)
ERYTHROCYTE [DISTWIDTH] IN BLOOD BY AUTOMATED COUNT: 16 % (ref 11.3–14.5)
GLUCOSE BLD-MCNC: 202 MG/DL (ref 70–100)
GLUCOSE BLDC GLUCOMTR-MCNC: 216 MG/DL (ref 70–130)
GLUCOSE BLDC GLUCOMTR-MCNC: 216 MG/DL (ref 70–130)
GLUCOSE BLDC GLUCOMTR-MCNC: 262 MG/DL (ref 70–130)
GLUCOSE BLDC GLUCOMTR-MCNC: 272 MG/DL (ref 70–130)
HCT VFR BLD AUTO: 25.8 % (ref 38.9–50.9)
HGB BLD-MCNC: 8.7 G/DL (ref 13.1–17.5)
IMM GRANULOCYTES # BLD AUTO: 0.46 10*3/MM3 (ref 0–0.03)
IMM GRANULOCYTES NFR BLD AUTO: 1.9 % (ref 0–0.6)
LYMPHOCYTES # BLD AUTO: 1.67 10*3/MM3 (ref 0.6–4.8)
LYMPHOCYTES NFR BLD AUTO: 7 % (ref 24–44)
MAGNESIUM SERPL-MCNC: 1.9 MG/DL (ref 1.3–2.7)
MAGNESIUM SERPL-MCNC: 1.9 MG/DL (ref 1.3–2.7)
MCH RBC QN AUTO: 29.7 PG (ref 27–31)
MCHC RBC AUTO-ENTMCNC: 33.7 G/DL (ref 32–36)
MCV RBC AUTO: 88.1 FL (ref 80–99)
MONOCYTES # BLD AUTO: 1.35 10*3/MM3 (ref 0–1)
MONOCYTES NFR BLD AUTO: 5.6 % (ref 0–12)
NEUTROPHILS # BLD AUTO: 20.34 10*3/MM3 (ref 1.5–8.3)
NEUTROPHILS NFR BLD AUTO: 85.2 % (ref 41–71)
PHOSPHATE SERPL-MCNC: 2.4 MG/DL (ref 2.4–5.1)
PHOSPHATE SERPL-MCNC: 2.4 MG/DL (ref 2.4–5.1)
PLAT MORPH BLD: NORMAL
PLATELET # BLD AUTO: 297 10*3/MM3 (ref 150–450)
PMV BLD AUTO: 9.8 FL (ref 6–12)
POTASSIUM BLD-SCNC: 3.7 MMOL/L (ref 3.5–5.5)
PROCALCITONIN SERPL-MCNC: 3.52 NG/ML
PROT SERPL-MCNC: 4.3 G/DL (ref 5.7–8.2)
RBC # BLD AUTO: 2.93 10*6/MM3 (ref 4.2–5.76)
RBC MORPH BLD: NORMAL
SODIUM BLD-SCNC: 133 MMOL/L (ref 132–146)
TRIGL SERPL-MCNC: 133 MG/DL (ref 0–150)
VANCOMYCIN TROUGH SERPL-MCNC: 18.3 MCG/ML (ref 10–20)
WBC MORPH BLD: NORMAL
WBC NRBC COR # BLD: 23.91 10*3/MM3 (ref 3.5–10.8)

## 2019-01-07 PROCEDURE — 86301 IMMUNOASSAY TUMOR CA 19-9: CPT | Performed by: SURGERY

## 2019-01-07 PROCEDURE — 83735 ASSAY OF MAGNESIUM: CPT | Performed by: INTERNAL MEDICINE

## 2019-01-07 PROCEDURE — 94799 UNLISTED PULMONARY SVC/PX: CPT

## 2019-01-07 PROCEDURE — 80202 ASSAY OF VANCOMYCIN: CPT

## 2019-01-07 PROCEDURE — 85025 COMPLETE CBC W/AUTO DIFF WBC: CPT | Performed by: INTERNAL MEDICINE

## 2019-01-07 PROCEDURE — 85007 BL SMEAR W/DIFF WBC COUNT: CPT | Performed by: INTERNAL MEDICINE

## 2019-01-07 PROCEDURE — 97168 OT RE-EVAL EST PLAN CARE: CPT

## 2019-01-07 PROCEDURE — 99291 CRITICAL CARE FIRST HOUR: CPT | Performed by: INTERNAL MEDICINE

## 2019-01-07 PROCEDURE — 84145 PROCALCITONIN (PCT): CPT | Performed by: INTERNAL MEDICINE

## 2019-01-07 PROCEDURE — 25010000002 HEPARIN (PORCINE) PER 1000 UNITS: Performed by: INTERNAL MEDICINE

## 2019-01-07 PROCEDURE — 84100 ASSAY OF PHOSPHORUS: CPT | Performed by: INTERNAL MEDICINE

## 2019-01-07 PROCEDURE — 25010000002 MEROPENEM

## 2019-01-07 PROCEDURE — 83605 ASSAY OF LACTIC ACID: CPT | Performed by: INTERNAL MEDICINE

## 2019-01-07 PROCEDURE — 84478 ASSAY OF TRIGLYCERIDES: CPT

## 2019-01-07 PROCEDURE — 97530 THERAPEUTIC ACTIVITIES: CPT

## 2019-01-07 PROCEDURE — 84100 ASSAY OF PHOSPHORUS: CPT

## 2019-01-07 PROCEDURE — 94640 AIRWAY INHALATION TREATMENT: CPT

## 2019-01-07 PROCEDURE — 82465 ASSAY BLD/SERUM CHOLESTEROL: CPT

## 2019-01-07 PROCEDURE — 63710000001 INSULIN REGULAR HUMAN PER 5 UNITS: Performed by: INTERNAL MEDICINE

## 2019-01-07 PROCEDURE — 25010000002 LINEZOLID 600 MG/300ML SOLUTION: Performed by: INTERNAL MEDICINE

## 2019-01-07 PROCEDURE — 25010000002 POTASSIUM CHLORIDE PER 2 MEQ OF POTASSIUM

## 2019-01-07 PROCEDURE — 25010000002 HYDROMORPHONE PER 4 MG: Performed by: INTERNAL MEDICINE

## 2019-01-07 PROCEDURE — 25010000002 ENOXAPARIN PER 10 MG: Performed by: INTERNAL MEDICINE

## 2019-01-07 PROCEDURE — 83735 ASSAY OF MAGNESIUM: CPT

## 2019-01-07 PROCEDURE — 82962 GLUCOSE BLOOD TEST: CPT

## 2019-01-07 PROCEDURE — 25010000002 ONDANSETRON PER 1 MG: Performed by: SURGERY

## 2019-01-07 PROCEDURE — 80053 COMPREHEN METABOLIC PANEL: CPT

## 2019-01-07 PROCEDURE — 82330 ASSAY OF CALCIUM: CPT

## 2019-01-07 PROCEDURE — 25010000002 CALCIUM GLUCONATE PER 10 ML

## 2019-01-07 PROCEDURE — 25010000002 MAGNESIUM SULFATE PER 500 MG OF MAGNESIUM

## 2019-01-07 PROCEDURE — 94760 N-INVAS EAR/PLS OXIMETRY 1: CPT

## 2019-01-07 RX ORDER — FLECAINIDE ACETATE 50 MG/1
50 TABLET ORAL EVERY 12 HOURS SCHEDULED
Status: DISCONTINUED | OUTPATIENT
Start: 2019-01-07 | End: 2019-01-15 | Stop reason: HOSPADM

## 2019-01-07 RX ORDER — LINEZOLID 2 MG/ML
600 INJECTION, SOLUTION INTRAVENOUS EVERY 12 HOURS SCHEDULED
Status: DISCONTINUED | OUTPATIENT
Start: 2019-01-07 | End: 2019-01-15 | Stop reason: HOSPADM

## 2019-01-07 RX ADMIN — HYDROMORPHONE HYDROCHLORIDE 0.5 MG: 1 INJECTION, SOLUTION INTRAMUSCULAR; INTRAVENOUS; SUBCUTANEOUS at 08:39

## 2019-01-07 RX ADMIN — INSULIN HUMAN 6 UNITS: 100 INJECTION, SOLUTION PARENTERAL at 23:08

## 2019-01-07 RX ADMIN — MEROPENEM 1 G: 1 INJECTION, POWDER, FOR SOLUTION INTRAVENOUS at 08:23

## 2019-01-07 RX ADMIN — MEROPENEM 1 G: 1 INJECTION, POWDER, FOR SOLUTION INTRAVENOUS at 00:21

## 2019-01-07 RX ADMIN — LINEZOLID 600 MG: 600 INJECTION, SOLUTION INTRAVENOUS at 20:37

## 2019-01-07 RX ADMIN — ENOXAPARIN SODIUM 90 MG: 100 INJECTION SUBCUTANEOUS at 12:09

## 2019-01-07 RX ADMIN — INSULIN HUMAN 2 UNITS: 100 INJECTION, SOLUTION PARENTERAL at 17:49

## 2019-01-07 RX ADMIN — SMOFLIPID 200 ML: 6; 6; 5; 3 INJECTION, EMULSION INTRAVENOUS at 17:50

## 2019-01-07 RX ADMIN — FLECAINIDE ACETATE 50 MG: 50 TABLET ORAL at 20:36

## 2019-01-07 RX ADMIN — ONDANSETRON 4 MG: 2 INJECTION INTRAMUSCULAR; INTRAVENOUS at 04:23

## 2019-01-07 RX ADMIN — BUDESONIDE 0.5 MG: 0.5 INHALANT RESPIRATORY (INHALATION) at 07:40

## 2019-01-07 RX ADMIN — INSULIN HUMAN 4 UNITS: 100 INJECTION, SOLUTION PARENTERAL at 00:21

## 2019-01-07 RX ADMIN — HYDROMORPHONE HYDROCHLORIDE 0.5 MG: 1 INJECTION, SOLUTION INTRAMUSCULAR; INTRAVENOUS; SUBCUTANEOUS at 16:38

## 2019-01-07 RX ADMIN — BUDESONIDE 0.5 MG: 0.5 INHALANT RESPIRATORY (INHALATION) at 21:50

## 2019-01-07 RX ADMIN — IPRATROPIUM BROMIDE AND ALBUTEROL SULFATE 3 ML: 2.5; .5 SOLUTION RESPIRATORY (INHALATION) at 13:22

## 2019-01-07 RX ADMIN — IPRATROPIUM BROMIDE AND ALBUTEROL SULFATE 3 ML: 2.5; .5 SOLUTION RESPIRATORY (INHALATION) at 21:50

## 2019-01-07 RX ADMIN — ENOXAPARIN SODIUM 90 MG: 100 INJECTION SUBCUTANEOUS at 20:36

## 2019-01-07 RX ADMIN — MEROPENEM 1 G: 1 INJECTION, POWDER, FOR SOLUTION INTRAVENOUS at 16:31

## 2019-01-07 RX ADMIN — INSULIN HUMAN 4 UNITS: 100 INJECTION, SOLUTION PARENTERAL at 17:50

## 2019-01-07 RX ADMIN — HYDROMORPHONE HYDROCHLORIDE 0.5 MG: 1 INJECTION, SOLUTION INTRAMUSCULAR; INTRAVENOUS; SUBCUTANEOUS at 04:10

## 2019-01-07 RX ADMIN — LINEZOLID 600 MG: 600 INJECTION, SOLUTION INTRAVENOUS at 10:39

## 2019-01-07 RX ADMIN — HYDROMORPHONE HYDROCHLORIDE 0.5 MG: 1 INJECTION, SOLUTION INTRAMUSCULAR; INTRAVENOUS; SUBCUTANEOUS at 20:36

## 2019-01-07 RX ADMIN — INSULIN HUMAN 2 UNITS: 100 INJECTION, SOLUTION PARENTERAL at 14:28

## 2019-01-07 RX ADMIN — PANTOPRAZOLE SODIUM 40 MG: 40 INJECTION, POWDER, FOR SOLUTION INTRAVENOUS at 05:58

## 2019-01-07 RX ADMIN — SCOPALAMINE 1 PATCH: 1 PATCH, EXTENDED RELEASE TRANSDERMAL at 10:39

## 2019-01-07 RX ADMIN — INSULIN HUMAN 6 UNITS: 100 INJECTION, SOLUTION PARENTERAL at 14:27

## 2019-01-07 RX ADMIN — INSULIN HUMAN 4 UNITS: 100 INJECTION, SOLUTION PARENTERAL at 05:58

## 2019-01-07 RX ADMIN — IPRATROPIUM BROMIDE AND ALBUTEROL SULFATE 3 ML: 2.5; .5 SOLUTION RESPIRATORY (INHALATION) at 07:39

## 2019-01-07 RX ADMIN — INSULIN HUMAN 2 UNITS: 100 INJECTION, SOLUTION PARENTERAL at 23:07

## 2019-01-07 RX ADMIN — HEPARIN SODIUM 5000 UNITS: 5000 INJECTION INTRAVENOUS; SUBCUTANEOUS at 05:58

## 2019-01-07 RX ADMIN — CALCIUM GLUCONATE: 94 INJECTION, SOLUTION INTRAVENOUS at 17:49

## 2019-01-07 RX ADMIN — MICAFUNGIN SODIUM 100 MG: 20 INJECTION, POWDER, LYOPHILIZED, FOR SOLUTION INTRAVENOUS at 20:37

## 2019-01-07 RX ADMIN — FLECAINIDE ACETATE 50 MG: 50 TABLET ORAL at 12:09

## 2019-01-07 NOTE — PROGRESS NOTES
Pulmonary/Critical Care Follow-up     LOS: 7 days   Patient Care Team:  Adiel Denney MD as PCP - General  Loida Campbell APRN as Nurse Practitioner (Nurse Practitioner)    Chief Complaint / reason : Pancreatic cancer / medical management of post-operative conditions including diabetes, atrial fibrillation.        Subjective    68-year-old male with a history of biliary tract obstruction who underwent cholecystectomy last August with subsequent ERCP for biliary stricture with stent placement.  He subsequently had an FNA on September 28 which was suspicious for adenocarcinoma in the head of the pancreas.  He had a repeat ERCP on 10/1/2018 and a new stent was placed.  He has been treated multiple times for recurrent bacteremias associated with his biliary obstruction.  He was subsequently treated with neoadjuvant chemotherapy.    The patient underwent a Whipple procedure with wedge biopsy of liver tumor and portal vein resection/lateral repair with gastrojejunostomy tube placement by Dr. Brody on December 31, 2018.  He is transferred to the intensive care unit on January 5 with increasing confusion and tachypnea and a CT scan showing portal venous gas, pneumatosis/ileus and intra-abdominal fluid.    Interval History:   Patient reports 6 out of 10 abdominal pain currently.  His abdominal drain was apparently discontinued yesterday and he has had no significant output from drain site.  He has been somewhat drowsy.  He has been in sinus rhythm.  No fevers or chills.  Family is at bedside.    History taken from: Patient, staff.    PMH/FH/Social History were reviewed and updated appropriately in the electronic medical record.     Review of Systems:    Review of 14 systems was completed with positives and pertinent negatives noted in the subjective section.  All other systems reviewed and are negative.         Objective     Vital Signs  Temp:  [98.3 °F (36.8 °C)-99.5 °F (37.5 °C)] 99.5 °F (37.5  °C)  Heart Rate:  [] 100  Resp:  [20-30] 22  BP: (120-167)/() 167/96  01/06 0701 - 01/07 0700  In: 3963.4 [I.V.:2336.2]  Out: 1965 [Urine:1365; Drains:200]  Body mass index is 27.37 kg/m².     Physical Exam:     Constitutional:   Drowsy, cooperative, in no acute distress   Head:   Normocephalic, without obvious abnormality, atraumatic   Eyes:           Lids and lashes normal, conjunctivae and sclerae normal.  No icterus, no pallor, corneas clear, PER   ENMT:  Ears appear intact with no abnormalities noted     No oral lesions, no thrush, oral mucosa moist   Neck:  No adenopathy, supple, trachea midline, no thyromegaly, no JVD   Lungs/Resp:    Normal effort, symmetric chest rise, no crepitus, clear to      auscultation bilaterally, no chest wall tenderness                Heart/CV:   Regular rhythm and normal rate, normal S1 and S2, no            murmur   Abdomen/GI:    Diminished bowel sounds, midline wound with staples intact, no masses, no organomegaly, soft, mildly tender, right drain site with minimal serous drainage, non-distended   :    Deferred   Extremities/MSK:  No clubbing or cyanosis.  No edema.  Normal tone.    No deformities.    Pulses:  Pulses palpable and equal bilaterally   Skin:  No bleeding, bruising or rash   Heme/Lymph:  No cervical or supraclavicular adenopathy.   Neurologic:    Psychiatric:    Moves all extremities with no obvious focal motor deficit.  Cranial nerves 2 - 12 grossly intact  Oriented x 3, normal affect.      Results Review:     I reviewed the patient's new clinical results.   Results from last 7 days   Lab Units  01/07/19   0750  01/06/19   2240  01/06/19   0506  01/05/19   1759   01/03/19   0639   SODIUM mmol/L  133   --   131*  128*   < >  139   POTASSIUM mmol/L  3.7  3.7  3.3*  3.5   < >  3.4*   CHLORIDE mmol/L  104   --   100  100   < >  107   CO2 mmol/L  21.0   --   22.0  16.0*   < >  24.0   BUN mg/dL  33*   --   36*  35*   < >  25*   CREATININE mg/dL  0.93   --    1.14  1.22   < >  0.95   CALCIUM mg/dL  7.7*   --   8.0*  8.2*   < >  8.0*   BILIRUBIN mg/dL  0.8   --   1.0   --    --   0.7   ALK PHOS U/L  117*   --   84   --    --   59   ALT (SGPT) U/L  24   --   11   --    --   21   AST (SGOT) U/L  41*   --   18   --    --   15   GLUCOSE mg/dL  202*   --   179*  235*   < >  207*    < > = values in this interval not displayed.     Results from last 7 days   Lab Units  01/07/19   0750  01/06/19   0506  01/05/19   1759   WBC 10*3/mm3  23.91*  18.36*  22.42*   HEMOGLOBIN g/dL  8.7*  9.0*  9.8*   HEMATOCRIT %  25.8*  27.1*  29.8*   PLATELETS 10*3/mm3  297  279  336   MONOCYTES % %   --   5.0   --      Results from last 7 days   Lab Units  01/06/19   0350   PH, ARTERIAL pH units  7.525*   PO2 ART mm Hg  61.6*   PCO2, ARTERIAL mm Hg  24.9   HCO3 ART mmol/L  20.6     Results from last 7 days   Lab Units  01/07/19   0750  01/06/19   0506  01/05/19   0132   01/03/19   0639   MAGNESIUM mg/dL  1.9  1.9  2.1  2.3   < >  1.9   PHOSPHORUS mg/dL  2.4  2.4  3.4   --    --   2.3*    < > = values in this interval not displayed.   procalcitonin 1/6: 6.71 -> 1/7: 3.52.   Results from last 7 days   Lab Units  01/01/19   0542   HEMOGLOBIN A1C %  6.10*       I reviewed the patient's new imaging including images and reports.  CXR 1/6/18:  IMPRESSION:  Low lung volumes with increased markings at the lung bases  Bilaterally.    CT Abd/pelvis 1/5/19:  IMPRESSION:  There are small bilateral pleural effusions. There is a  fluid collection seen surrounding the liver and spleen containing air in  the fluid collection around the liver. There is air seen scattered  throughout the abdomen which may be postoperative. There is air  identified within the mesenteric vessels with findings suggesting  pneumatosis throughout scattered small bowel loops within the mid  abdomen. Fluid throughout the colon suggesting clinical presentation of  diarrhea. Gastrostomy tube is in satisfactory position.      Medication  Review:     budesonide 0.5 mg Nebulization BID - RT   enoxaparin 1 mg/kg Subcutaneous Q12H   Fat Emulsion Fish Oil Based 200 mL Intravenous Q24H   flecainide 50 mg Oral Q12H   insulin regular 0-9 Units Subcutaneous Q6H   insulin regular 2 Units Subcutaneous Q6H   ipratropium-albuterol 3 mL Nebulization 4x Daily - RT   Linezolid 600 mg Intravenous Q12H   meropenem 1 g Intravenous Q8H   micafungin (MYCAMINE)  mg Intravenous Q24H   pantoprazole 40 mg Intravenous Q AM   Scopolamine 1 patch Transdermal Q72H   sodium chloride 10 mL Intravenous Q12H       Adult Central 2-in-1 TPN     Adult Cyclic 2-in-1 TPN  Last Rate: 75 mL/hr at 01/07/19 1000   Pharmacy to Dose TPN         Assessment/Plan       Malignant neoplasm of head of pancreas (CMS/HCC)    Hyperlipidemia    COPD (chronic obstructive pulmonary disease) (CMS/HCC)    Essential hypertension    Gastroesophageal reflux disease without esophagitis    DM2 (diabetes mellitus, type 2) (CMS/HCC)    Paroxysmal atrial fibrillation    Chronic anticoagulation, Eliquis    Post-operative confusion and somnolence, likely due to oversedation    Atrial fibrillation with RVR (CMS/HCC)    Adult necrotizing enterocolitis (CMS/HCC)    Encephalopathy    67 YO with h/o DMII, CKD3, GERD, PAF on Eliquis, locally advanced pancreatic cancer with zulay-adjuvant chemotherapy admitted post Pancreaticoduodenectomy, wedge biopsy of liver, portal vein resection and lateral repair and GJ tube by Dr. Brody on 12/31/18.      Moved to ICU on 1/5/18 due to concerns of breathing difficulty, encephalopathy and findings of pneumatosis, portal venous gas and ileus on CT abdomen/pelvis and concern for sepsis.      Plan:  1. Lovenox for anticoagulation for atrial fibrilation (okay for anticoagulation per Dr. Brody).   2. Antibiotics per ID.   3. Add scheduled regular insulin.   4. Continue TPN.   5. Restart oral flecainide (Okay per Dr. Brody).   6. Follow up cultures.   7. Judicious use of  narcotics for pain management.      Patient remains critically ill.       Gerardo Messina MD  01/07/19  4:42 PM    Critical care time : 45 minutes.(This excludes time spent performing separately reportable procedures and services). including high complexity decision making to assess, manipulate, and support vital organ system failure in this individual who has impairment of one or more vital organ systems such that there is a high probability of imminent or life threatening deterioration in the patient’s condition.    *. Please note that portions of this note were completed with a voice recognition program. Efforts were made to edit the dictations, but occasionally words are mistranscribed.

## 2019-01-07 NOTE — PLAN OF CARE
Problem: Patient Care Overview  Goal: Plan of Care Review  Outcome: Ongoing (interventions implemented as appropriate)   01/07/19 4943   OTHER   Outcome Summary OT Re-eval complete s/p ICU t/f. Pt session limited d/t lethargy and limited command following. Pt Depx2 for bed/chair t/f w/ mechanical lift. Recommend cont skilled IPOT POC. Recommend pt DC to SNF.    Coping/Psychosocial   Plan of Care Reviewed With patient;family

## 2019-01-07 NOTE — PROGRESS NOTES
"Patient Name:  Todd Phillips  YOB: 1950  [id number]    Surgery Progress Note    Date of visit: 1/7/2019    Subjective   Subjective: improved somewhat today, more alert per wife.  Up in chair, responds appropriately to questions.  Reports abdominal pain unchanged from this AM, denies nausea.           Objective     Objective:     /96   Pulse 100   Temp 99.5 °F (37.5 °C) (Bladder)   Resp 22   Ht 182.9 cm (72\")   Wt 91.5 kg (201 lb 12.8 oz)   SpO2 97%   BMI 27.37 kg/m²     Intake/Output Summary (Last 24 hours) at 1/7/2019 1427  Last data filed at 1/7/2019 1200  Gross per 24 hour   Intake 4381.4 ml   Output 1715 ml   Net 2666.4 ml       CV:  Rhythm regular and rate regular  L:  Slightly coarse to auscultation bilaterally  Abd:  Mildly distended, soft, Mild to moderate tenderness throughout, no signs of peritonitis   Ext:  No cyanosis, clubbing, edema    Labs that are back at this time have been reviewed.          Assessment/Plan     Assessment/ Plan:    69 yo man s/p Whipple 12/31/18, now with adult necrotizing enterocolitis.  Moved to ICU 1/5/19 and now slowly improving.     - continue NPO and TPN  - continue broad antibiotics   - mobilize as much as possible, pulmonary toilet   - agree with continued ICU care       Hospital Problem List     * (Principal) Malignant neoplasm of head of pancreas (CMS/[facility])    Overview Signed 9/5/2018  9:58 AM by Olya Vaughn     Added automatically from request for surgery [id number]         Hyperlipidemia (Chronic)        COPD (chronic obstructive pulmonary disease) (CMS/[facility]) (Chronic)        Essential hypertension (Chronic)        Gastroesophageal reflux disease without esophagitis (Chronic)    DM2 (diabetes mellitus, type 2) (CMS/[facility]) (Chronic)        Paroxysmal atrial fibrillation (Chronic)    Chronic anticoagulation, Eliquis (Chronic)    Post-operative confusion and somnolence, likely due to oversedation        Atrial fibrillation with RVR (CMS/[facility])    "           Nisha Ackerman MD  1/7/2019  2:27 PM

## 2019-01-07 NOTE — PROGRESS NOTES
Pharmacy Parenteral Nutrition Evaluation  Todd Phillips is a  68 y.o. male receiving TPN.     Indication: Necrotizing enterocolitis  Consulting Physician:  Dr Brody    Labs  Results from last 7 days   Lab Units  01/07/19   0750  01/06/19   2240  01/06/19   0506  01/05/19   1759   01/03/19   0639   SODIUM mmol/L  133   --   131*  128*   < >  139   POTASSIUM mmol/L  3.7  3.7  3.3*  3.5   < >  3.4*   CHLORIDE mmol/L  104   --   100  100   < >  107   CO2 mmol/L  21.0   --   22.0  16.0*   < >  24.0   BUN mg/dL  33*   --   36*  35*   < >  25*   CREATININE mg/dL  0.93   --   1.14  1.22   < >  0.95   CALCIUM mg/dL  7.7*   --   8.0*  8.2*   < >  8.0*   BILIRUBIN mg/dL  0.8   --   1.0   --    --   0.7   ALK PHOS U/L  117*   --   84   --    --   59   ALT (SGPT) U/L  24   --   11   --    --   21   AST (SGOT) U/L  41*   --   18   --    --   15   GLUCOSE mg/dL  202*   --   179*  235*   < >  207*    < > = values in this interval not displayed.     Results from last 7 days   Lab Units  01/07/19   0750  01/06/19   1406  01/06/19   0506  01/05/19   0132   01/03/19   0639   MAGNESIUM mg/dL  1.9  1.9   --   2.1  2.3   < >  1.9   PHOSPHORUS mg/dL  2.4  2.4   --   3.4   --    --   2.3*   PREALBUMIN mg/dL   --   <5.0*   --    --    --    --     < > = values in this interval not displayed.     Results from last 7 days   Lab Units  01/07/19   0750  01/06/19   0506  01/05/19   1759   WBC 10*3/mm3  23.91*  18.36*  22.42*   HEMOGLOBIN g/dL  8.7*  9.0*  9.8*   HEMATOCRIT %  25.8*  27.1*  29.8*   PLATELETS 10*3/mm3  297  279  336     Triglycerides   Date Value Ref Range Status   01/07/2019 133 0 - 150 mg/dL Final     estimated creatinine clearance is 95.7 mL/min (by C-G formula based on SCr of 0.93 mg/dL).    Intake & Output (last 3 days)         01/04 0701 - 01/05 0700 01/05 0701 - 01/06 0700 01/06 0701 - 01/07 0700 01/07 0701 - 01/08 0700    P.O. 250 0      I.V. (mL/kg)  5552.1 (60.7) 2336.2 (25.5) 399 (4.4)    Other 583 186      NG/  541      IV Piggyback 50 349.8 1051 73    TPN   576.2 271    Total Intake(mL/kg) 1805 (19.7) 6628.9 (72.4) 3963.4 (43.3) 743 (8.1)    Urine (mL/kg/hr) 0 (0) 525 (0.2) 1365 (0.6) 275 (0.6)    Emesis/NG output   400 0    Drains 65 1195 200     Stool 0 0      Total Output 65 1720 1965 275    Net +1740 +4908.9 +1998.4 +468            Urine Unmeasured Occurrence 2 x 2 x      Stool Unmeasured Occurrence 2 x 3 x              Dietitian Recommendations     Current TPN Regimen Recommendation:  Dextrose 20% / Amino Acid 7% at goal rate of 75mL/hr.  20% SMOF Lipid Emulsion 200mL every 24 hours.    Assessment/Plan:  1. Continuing TPN for necrotizing enterocolitis. Per dietary recommendation will increase dextrose to 20%. Goal rate of 75mL/hr reached this AM.  2. Will continue with standard electrolytes and 1:1 acid base. Electrolyte replacements and sliding scale insulin are available.   3. Pharmacy will continue to follow and adjust as indicated.     Thanks,   Dhara Brooks, PharmD  Pharmacy Resident  1/7/2019  11:43 AM

## 2019-01-07 NOTE — CONSULTS
Todd Phillips  1950  [id number]    Date of Consult: 1/7/2019    Admit Date:  12/31/2018      Requesting Provider: Miquel Brody,*  Evaluating Physician: Scot Higgins MD    Chief Complaint: abd pain    Reason for Consultation: postop infection    History of present illness:    Patient is a 68 y.o.  Yr old male with history of biliary tract obstruction, admitted to [facility] multiple times in the last 2 months including August 10 until August 16, underwent cholecystectomy with pathology suggesting chronic cholecystitis, had ERCP on August 14 with biliary stricture/proximal duct dilation and had stent placement.  He was readmitted August 21, 2018 until August 25, 2018 with acute sepsis, Klebsiella bacteremia, discharged with oral antibiotics.  Readmitted September 12, 2018 until September 17, 2018 with ESBL Escherichia coli bacteremia seen by infectious disease in [place] and treated with Invanz, duration of therapy unclear but approximately until September 24.  During that period of time, concern for malignancy mentioned in notes and referred to [facility] for further biopsy, done September 28.  Presented once again to [facility] emergency room September 30, 2018 with acute onset abdominal pain/nausea/vomiting.  Blood cultures positive again with  Klebsiella species and  Transferred to [facility]. 10/1/18 ERCP with evidence for obstruction/purulence and new stent placed; He was treated with rocephin/flagyl and last seen by us early October; after that, he had more definitive diagnosis of pancreatic cancer and treated with neoadjuvant chemotherapy and right chest port placement.  In addition he required TPN.  Family not aware of any other specific microbiologic diagnosis since October; he was admitted December 31 and underwent pancreaticoduodenectomy, wedge biopsy of liver tumor and portal vein resection/lateral repair with gastrojejunostomy tube  placement.  Moved to ICU on January 5 with concern regarding increased confusion/Tachypnea, abnormal CT scan with pneumatosis/ileus and portal venous gas in addition to intra-abdominal fluid per description of radiology.  Chest x-ray January 6 with low lung volumes and increased markings at lung bases bilaterally but no focal parenchymal opacification per radiology.  Head CT no acute intracranial abnormality.    Patient  Confused and does not cooperate with a history or review of systems.  He answers yes/no to some simple questions but unclear reliability.  Nursing reports left arm PICC line new on January 6, chronic right port in the chest with no new redness/swelling or change there.  Indwelling Hassan catheter, G/J-tube, and ostomy bag over prior MADDY drain site; MADDY drain removed January 6.  No stool output since admission per nursing.  No rash.  On nasal cannula oxygen with no productive cough.  Patient's alertness better January 7 compared to January 6 per nursing.    Patient does not cooperate with ROS otherwise        Past Medical History:   Diagnosis Date   • Antral gastritis    • Asthma    • Atrial fibrillation (CMS/HCC)     treated with oral blood thinner   • Chest pain    • COPD (chronic obstructive pulmonary disease) (CMS/HCC)    • Coronary artery disease     no stents   • Diabetes mellitus (CMS/HCC)     type 2 - checks sugar 4 times per day    • Duodenitis    • Elevated cholesterol    • Emphysema of lung (CMS/HCC)    • Gallbladder disease     removed    • GERD (gastroesophageal reflux disease)    • Hard to intubate     busted lip last time   • Hyperlipidemia    • Hypertension    • Jaundice    • Kidney stone    • Kidney stones     had lithotripsy and passed 36 kidney stones as well as had one surgically removed.   • Malignant neoplasm of head of pancreas (CMS/HCC) 9/5/2018   • Nausea    • Obstructive sleep apnea on CPAP     compliant with machine    • Palpitations    • Pneumonia 08/2018   • Reflux  esophagitis    • Renal failure     stage 3 kidney disease   • Sepsis (CMS/HCC)        Past Surgical History:   Procedure Laterality Date   • BILE DUCT STENT PLACEMENT      [facility] 2 weeks ago    • CARDIAC CATHETERIZATION  11/01/1999   • CARDIOVASCULAR STRESS TEST  09/2012   • CHOLECYSTECTOMY N/A 8/10/2018    Procedure: CHOLECYSTECTOMY LAPAROSCOPIC;  Surgeon: Ronak Downs MD;  Location:  [facility];  Service: General   • COLONOSCOPY     • CYSTOSCOPY BLADDER STONE LITHOTRIPSY     • ECHO - CONVERTED  09/2012   • ERCP N/A 8/14/2018    Procedure: ENDOSCOPIC RETROGRADE CHOLANGIOPANCREATOGRAPHY WITH PAPILLOTOMY;  Surgeon: Scot Su MD;  Location:  [facility];  Service: Gastroenterology   • ERCP N/A 10/1/2018    Procedure: ENDOSCOPIC RETROGRADE CHOLANGIOPANCREATOGRAPHY;  Surgeon: Jefry Luna MD;  Location:  [facility];  Service: Gastroenterology   • KIDNEY STONE SURGERY     • KIDNEY STONE SURGERY      open abdominal surgery   • PORTACATH PLACEMENT Right 10/23/2018    Procedure: INSERTION OF PORTACATH;  Surgeon: Ronak Downs MD;  Location:  [facility];  Service: General   • TONSILLECTOMY     • UPPER GASTROINTESTINAL ENDOSCOPY  08/30/2012   • WHIPPLE PROCEDURE N/A 12/31/2018    Procedure: WHIPPLE PROCEDURE, BIOPSY OF LIVER, PORTAL VEIN RESECTION AND REPAIR, G/J TUBE PLACEMENT;  Surgeon: Miquel Brody MD;  Location:  [facility];  Service: General       Pediatric History   Patient Guardian Status   • Not on file     Other Topics Concern   • Not on file   Social History Narrative    He is  and lives alone with his wife although they have 3 children together who all live close by. He is retired from the [employer] and was exposed to Agent Orange during [place]. He is a lifelong nonsmoker.        family history includes Diabetes in his sister and sister; Heart attack in his brother and mother; Heart disease in his brother and mother; Heart failure in his  brother and mother; Kidney disease in his mother; Lung disease in his father; No Known Problems in his brother and brother; Stroke in his mother; Tuberculosis in his father.    Allergies   Allergen Reactions   • Contrast Dye Other (See Comments)     Can't have due to kidney failure per family   • Fentanyl Confusion and Unknown (See Comments)     Patient family reports extreme symptoms with fentanyl. Including confusion, restlessness, irritability an heavy sedation.   • Chlorhexidine Hives and Rash       Medication:  Current Facility-Administered Medications   Medication Dose Route Frequency Provider Last Rate Last Dose   • acetaminophen (TYLENOL) tablet 500 mg  500 mg Oral Q4H PRN Miquel Brody MD   500 mg at 01/05/19 1224   • Adult Cyclic 2-in-1 TPN   Intravenous Cyclic TPN - See Admin Instructions Aletha Alvarez, [facility] 50 mL/hr at 01/07/19 0200     • budesonide (PULMICORT) nebulizer solution 0.5 mg  0.5 mg Nebulization BID - RT Miquel Brody MD   0.5 mg at 01/07/19 0740   • Fat Emulsion Fish Oil Based (SMOFLIPID) 20 % emulsion 200 mL  200 mL Intravenous Q24H Meagan Beltran, [facility]       • heparin (porcine) 5000 UNIT/ML injection 5,000 Units  5,000 Units Subcutaneous Q8H Fermin Cabrera MD   5,000 Units at 01/07/19 0558   • HYDROmorphone (DILAUDID) injection 0.5 mg  0.5 mg Intravenous Q2H PRN Fermin Cabrera MD   0.5 mg at 01/07/19 0839   • insulin regular (humuLIN R,novoLIN R) injection 0-9 Units  0-9 Units Subcutaneous Q6H Fredis Weinstein APRN   4 Units at 01/07/19 0558   • insulin regular (humuLIN R,novoLIN R) injection 2 Units  2 Units Subcutaneous Q6H Gerardo Messina MD       • ipratropium-albuterol (DUO-NEB) nebulizer solution 3 mL  3 mL Nebulization 4x Daily - RT Marjorie Lechuga MD   3 mL at 01/07/19 0739   • lactated ringers infusion  75 mL/hr Intravenous Continuous Miquel Brody MD 75 mL/hr at 01/06/19 2140 75 mL/hr at 01/06/19 2140   • Linezolid  (ZYVOX) 600 mg 300 mL  600 mg Intravenous Q12H Scot Higgins MD       • Magnesium Sulfate 2 gram Bolus, followed by 8 gram infusion (total Mg dose 10 grams)- Mg less than or equal to 1mg/dL  2 g Intravenous PRN Andreina Beltran MD        Or   • Magnesium Sulfate 2 gram / 50mL Infusion (GIVE X 3 BAGS TO EQUAL 6GM TOTAL DOSE) - Mg 1.1 - 1.5 mg/dl  2 g Intravenous PRN Andreina Beltran MD        Or   • Magnesium Sulfate 4 gram infusion- Mg 1.6-1.9 mg/dL  4 g Intravenous PRN Andreina Beltran MD 25 mL/hr at 01/04/19 1643 4 g at 01/04/19 1643   • meropenem (MERREM) 1 g/100 mL 0.9% NS VTB (mbp)  1 g Intravenous Q8H Aletha Alvarez, [facility]   1 g at 01/07/19 0823   • metoprolol tartrate (LOPRESSOR) injection 5 mg  5 mg Intravenous Q4H PRN Fermin Cabrera MD       • micafungin 100 mg/100 mL 0.9% NS IVPB (mbp)  100 mg Intravenous Q24H Fermin Cabrera MD   100 mg at 01/06/19 2017   • naloxone (NARCAN) injection 0.1 mg  0.1 mg Intravenous Q5 Min PRN Miquel Brody MD       • nitroglycerin (NITROSTAT) SL tablet 0.4 mg  0.4 mg Sublingual PRN Miquel Brody MD       • ondansetron (ZOFRAN) injection 4 mg  4 mg Intravenous Q6H PRN Miquel Brody MD   4 mg at 01/07/19 0423   • pantoprazole (PROTONIX) injection 40 mg  40 mg Intravenous Q AM Miquel Brody MD   40 mg at 01/07/19 0558   • Pharmacy to Dose TPN   Does not apply Continuous PRN Fermin Cabrera MD       • potassium chloride (MICRO-K) CR capsule 40 mEq  40 mEq Oral PRN Marjorie Lechuga MD        Or   • potassium chloride (KLOR-CON) packet 40 mEq  40 mEq Oral PRN Marjorie Lechuga MD   40 mEq at 01/04/19 2035    Or   • potassium chloride 10 mEq in 100 mL IVPB  10 mEq Intravenous Q1H PRN Marjorie Lechuga  mL/hr at 01/06/19 1818 10 mEq at 01/06/19 1818   • Scopolamine (TRANSDERM-SCOP) 1.5 MG/3DAYS patch 1 patch  1 patch Transdermal Q72H Miquel Brody MD   1 patch at 01/04/19 0827   • sodium chloride  0.9 % flush 10 mL  10 mL Intravenous Q12H Fermin Cabrera MD   10 mL at 01/06/19 2017   • sodium chloride 0.9 % flush 10 mL  10 mL Intravenous PRN Fermin Cabrera MD         Facility-Administered Medications Ordered in Other Encounters   Medication Dose Route Frequency Provider Last Rate Last Dose   • sodium chloride 0.9 % infusion  - ADS Override Pull            • sodium chloride 0.9 % infusion  - ADS Override Pull                Antibiotics:  Anti-Infectives (From admission, onward)    Ordered     Dose/Rate Route Frequency Start Stop    01/07/19 0942  Linezolid (ZYVOX) 600 mg 300 mL     Ordering Provider:  Scot Higgins MD    600 mg  300 mL/hr over 60 Minutes Intravenous Every 12 Hours Scheduled 01/07/19 1100 01/17/19 0859    01/06/19 1306  meropenem (MERREM) 1 g/100 mL 0.9% NS VTB (mbp)     Ordering Provider:  Aletha Alvarez, RPH    1 g  over 3 Hours Intravenous Every 8 Hours 01/06/19 1600 01/12/19 2359    01/05/19 1820  micafungin 100 mg/100 mL 0.9% NS IVPB (mbp)     Ordering Provider:  Fermin Cabrera MD    100 mg  over 60 Minutes Intravenous Every 24 Hours 01/05/19 2000 01/12/19 1959 01/05/19 1859  meropenem (MERREM) 1 g/100 mL 0.9% NS VTB (mbp)     Ordering Provider:  Levi Bustos, RPH    1 g  over 30 Minutes Intravenous Once 01/05/19 1945 01/05/19 2039 01/05/19 1859  vancomycin 2250 mg/500 mL 0.9% NS IVPB ([facility])     Ordering Provider:  Levi Bustos, RPH    2,250 mg Intravenous Once 01/05/19 1945 01/05/19 2009 01/01/19 0936  piperacillin-tazobactam (ZOSYN) 3.375 g in iso-osmotic dextrose 50 ml (premix)     Ordering Provider:  Miquel Brody MD    3.375 g  over 30 Minutes Intravenous Once 01/01/19 1030 01/01/19 1209    12/31/18 0632  ceFAZolin in dextrose (ANCEF) IVPB solution 2 g     Ordering Provider:  Miquel Brody MD    2 g  over 30 Minutes Intravenous Once 12/31/18 0634 12/31/18 0800    12/31/18 0632  metroNIDAZOLE (FLAGYL)  "IVPB 500 mg     Ordering Provider:  Miquel Brody MD    500 mg  over 60 Minutes Intravenous Once 12/31/18 0634 12/31/18 0830            Review of Systems    1/7/19 as above, patient confused and not cooperative with review of systems    Physical Exam:   Vital Signs   /96 (BP Location: Right arm, Patient Position: Lying)   Pulse 101   Temp 99.5 °F (37.5 °C) (Bladder)   Resp 20   Ht 182.9 cm (72\")   Wt 91.5 kg (201 lb 12.8 oz)   SpO2 94%   BMI 27.37 kg/m²     GENERAL: He opens his eyes, follows simple commands but not oriented.  Nasal cannula oxygen  HEENT: Normocephalic, atraumatic.  PERRL. EOMI. No conjunctival injection. No icterus. Oropharynx clear without evidence of thrush or exudate. No evidence of peridontal disease.    NECK: Supple without nuchal rigidity. No mass.  LYMPH: No cervical, axillary or inguinal lymphadenopathy.  HEART: RRR; No murmur, rubs, gallops.   LUNGS: Diminished at bases bilaterally without wheezing, rales, rhonchi. Normal respiratory effort. Nonlabored. No dullness.  ABDOMEN: Soft, tender and distended. Positive bowel sounds managed. No rebound or guarding. NO mass or HSM.  Ostomy bag noted at right abdomen but no active drainage or fluid in bag.  Surgical site noted with no redness induration or warmth.  No mass bulge or fluctuance.  No crepitus or bulla.  Feeding tube site noted with no redness induration or warmth.  No mass bulge or fluctuance appreciated.  No crepitus or bulla.  No purulence or skin necrosis  EXT:  No cyanosis, clubbing. No cord.  : Genitalia generally unremarkable.  With Hassan catheter.  MSK: FROM without joint effusions noted arms/legs.    SKIN: Warm and dry without cutaneous eruptions on Inspection/palpation.    NEURO: He moves all 4 extremities spontaneously but he is not cooperate with a detailed motor or sensory exam    Right chest port with no redness induration or warmth.  No crepitus or bulla.  No purulence    Left arm PICC line with " no redness or purulence    No peripheral stigmata/phenomena of endocarditis    Laboratory Data    Results from last 7 days   Lab Units  01/07/19   0750  01/06/19   0506  01/05/19   1759   WBC 10*3/mm3  23.91*  18.36*  22.42*   HEMOGLOBIN g/dL  8.7*  9.0*  9.8*   HEMATOCRIT %  25.8*  27.1*  29.8*   PLATELETS 10*3/mm3  297  279  336     Results from last 7 days   Lab Units  01/07/19   0750   SODIUM mmol/L  133   POTASSIUM mmol/L  3.7   CHLORIDE mmol/L  104   CO2 mmol/L  21.0   BUN mg/dL  33*   CREATININE mg/dL  0.93   GLUCOSE mg/dL  202*   CALCIUM mg/dL  7.7*     Results from last 7 days   Lab Units  01/07/19   0750   ALK PHOS U/L  117*   BILIRUBIN mg/dL  0.8   ALT (SGPT) U/L  24   AST (SGOT) U/L  41*         Results from last 7 days   Lab Units  01/06/19   1406   CRP mg/dL  20.71*       Estimated Creatinine Clearance: 95.7 mL/min (by C-G formula based on SCr of 0.93 mg/dL).      Microbiology:      Radiology:  Imaging Results (last 72 hours)     Procedure Component Value Units Date/Time    CT Head Without Contrast [[id number]] Collected:  01/06/19 1803     Updated:  01/06/19 1803    Narrative:       EXAMINATION: CT HEAD WO CONTRAST - 1/6/2019     INDICATION:  Z01.89-Encounter for other specified special examinations;  C25.9-Malignant neoplasm of pancreas, unspecified; Z74.09-Other reduced  mobility. Mental status change.     TECHNIQUE: Multiple axial CT imaging is obtained of the head without the  administration of intravenous contrast.     The radiation dose reduction device was turned on for each scan per the  ALARA (As Low as Reasonably Achievable) protocol.     COMPARISON: 11/17/2018     FINDINGS: Atrophy of the brain with low-density area seen in the  periventricular and subcortical white matter suggesting chronic small  vessel ischemic change. No hemorrhage or hydrocephalus. No mass, mass  effect, or midline shift. No abnormal extra-axial fluid collections  identified. The bony structures reveal no evidence of  osseous  abnormality. The visualized paranasal sinuses are clear. The mastoid air  cells are patent.       Impression:       Chronic changes seen within the brain with no acute  intracranial abnormality identified.     DICTATED:   1/6/2019  EDITED/ls :   1/6/2019           XR Chest 1 View [[id number]] Collected:  01/06/19 0915     Updated:  01/06/19 1309    Narrative:          EXAMINATION: XR CHEST 1 VW - 1/6/2019     INDICATION: Z01.89-Encounter for other specified special examinations;  C25.9-Malignant neoplasm of pancreas, unspecified; Z74.09-Other reduced  mobility      COMPARISON: 01/05/2019     FINDINGS: Portable chest reveals low lung volumes. Deep line catheter on  the right with tip in the SVC. Mild increased markings at lung bases  bilaterally with degenerative changes seen within the spine.  No focal  parenchymal opacification present.       Impression:       Low lung volumes with increased markings at the lung bases  bilaterally.     DICTATED:   1/6/2019  EDITED/ls :   1/6/2019           XR Chest 1 View [[id number]] Collected:  01/05/19 1928     Updated:  01/06/19 1008    Narrative:          EXAMINATION: XR CHEST 1 VW - 1/5/2019     INDICATION:  Z01.89-Encounter for other specified special examinations;  C25.9-Malignant neoplasm of pancreas, unspecified; Z74.09-Other reduced  mobility      COMPARISON: 01/05/2019     FINDINGS: Portable chest reveals deep line catheter on the right with  tip in the SVC. Mild increased markings at left lung base with small  left pleural effusion. Improvement seen in aeration of the lung bases in  the interval. Degenerative change is seen within the spine. Pulmonary  vascularity is within normal limits.           Impression:       Improvement seen in aeration of the lung bases in the  interval with only minimal right residual markings at the lung bases.     DICTATED:   1/5/2019  EDITED/ls :   1/5/2019      This report was finalized on 1/6/2019 10:06 AM by Dr. Cordova  MD Ramirez.       CT Abdomen Pelvis Without Contrast [[id number]] Collected:  01/05/19 1645     Updated:  01/05/19 1655    Narrative:       EXAMINATION: CT ABDOMEN/PELVIS WO CONTRAST - 1/5/2019      INDICATION:  Z01.89-Encounter for other specified special examinations;  C25.9-Malignant neoplasm of pancreas, unspecified; Z74.09-Other reduced  mobility. Abdominal pain.     TECHNIQUE: Multiple axial CT imaging is obtained of the abdomen and  pelvis without the administration of oral or intravenous contrast.     The radiation dose reduction device was turned on for each scan per the  ALARA (As Low as Reasonably Achievable) protocol.     COMPARISON: NONE     FINDINGS: There are small bilateral pleural effusions. Atelectatic  change is seen in the lung bases bilaterally. Extraluminal air is  identified throughout the upper abdomen. There is fluid seen surrounding  the spleen as well as the liver. There is a small to  moderate size  hiatal hernia. There is gastric distention. There are postoperative  changes seen from Whipple procedure. There is a small collection of  fluid identified along the leftward aspect of the liver. There is some  free fluid identified near the sarbjit hepatis. There is a G-tube  identified in satisfactory position. Kidneys and adrenal glands within  normal limits. There is gaseous distention of the small bowel loops  suggesting a postoperative ileus. There is air identified within the  mesenteric vessels. Findings are concerning for pneumatosis of several  small bowel loops centrally within the abdomen. There is fluid seen  scattered throughout the colon. Mild distention of the colon. There is  some fluid seen within the pelvis.     Pelvis: The pelvic portion of the gastrointestinal tract is within  normal limits. Fluid seen within the colon. The bladder reveals  air-fluid level likely from prior instrumentation. No pelvic adenopathy.  Bony structures reveal no evidence of osseous abnormality.        Impression:       There are small bilateral pleural effusions. There is a  fluid collection seen surrounding the liver and spleen containing air in  the fluid collection around the liver. There is air seen scattered  throughout the abdomen which may be postoperative. There is air  identified within the mesenteric vessels with findings suggesting  pneumatosis throughout scattered small bowel loops within the mid  abdomen. Fluid throughout the colon suggesting clinical presentation of  diarrhea. Gastrostomy tube is in satisfactory position.     DICTATED:   1/5/2019  EDITED/ls :   1/5/2019      This report was finalized on 1/5/2019 4:53 PM by Dr. Tasha Guzmán MD.       XR Chest 1 View [[id number]] Collected:  01/05/19 0045     Updated:  01/05/19 0154    Narrative:       EXAM:    XR Chest, 1 View     EXAM DATE/TIME:    1/5/2019 12:45 AM     CLINICAL HISTORY:    68 years old, male; Malignant neoplasm of pancreas, unspecified; Encounter for   other specified special examinations; Other reduced mobility; Other reduced   mobility; Signs and symptoms; Shortness of breath; Patient HX: SOA, right arm   pain, post op whipple     TECHNIQUE:    XR of the chest, 1 view.     COMPARISON:    CR XR CHEST 1 VW 1/3/2019 4:08 PM     FINDINGS:    Tubes, catheters and devices:  Right IJ power port in place. Monitor leads   project over the chest.    Lungs:  Low lung volumes with mild bibasilar atelectasis.    Pleural space:  No significant costophrenic angle blunting. No evidence of   pneumothorax.    Heart/Mediastinum:  Heart size appears prominent but is likely exaggerated by   the portable AP technique.    Vasculature:  Atherosclerotic tortuosity of the thoracic aorta.    Bones/joints: No acute osseous abnormality.       Impression:       Low lung volumes with mild bibasilar atelectasis.     THIS DOCUMENT HAS BEEN ELECTRONICALLY SIGNED BY DANE HIRSCH JR. MD            Impression:     --Acute postoperative leukocytosis and  low-grade fever, approx 99, at risk for infection multiple sites including abdomen, lung, urine, IV lines, etc.  IV sites look benign at present and Blood cultures negative so far.  Urinalysis with negative leuk esterase/nitrites and UTI generally less likely at present.  Low lung volumes on chest x-ray not unexpected after abdominal surgery, currently no cough and while he is at risk for pneumonia, unable to confirm at present.  Abdomen remains primary concern with enterocolitis, postoperative fluid and on empiric antibiotics with these concerns in mind with Zyvox/Merrem and micafungin.  At risk for MDR organisms with prior history ESBL and multiple hospitalizations, etc.    --Acute postoperative enterocolitis.  Recent surgery as outlined above in the setting of pancreatic cancer, prior neoadjuvant chemotherapy and tube feeding.  Tube feedings stopped and TPN restarted.  Dr. Brody following closely to determine timing/option/threshold for further surgical intervention or serial imaging/percutaneous drainage etc.  I have paged him to discuss case once he is out of the operating room.    --Pancreatic cancer with prior neoadjuvant chemotherapy, indwelling chronic port and recent surgery as outlined above.    --COPD    --Diabetes type 2    --Chronic anticoagulation with history A. fib and past antiarrhythmic therapy    --Hx ESBL    PLAN: Thank you for asking us to see Todd Phillips, I recommend the following:    --IV Zyvox/Merrem and Mycamine    --Check/review labs cultures and scans    --Discussed with family, history per them    --History per nursing as well    --Discussed with microbiology    --Highly complex set of issues with high risk for further serious morbidity and other serious sequela    --If not steadily improving, you should give consideration to serial imaging and consideration for further surgical intervention versus interventional radiology options, etc.       Scot Higgins,  MD  1/7/2019

## 2019-01-07 NOTE — PLAN OF CARE
Problem: Patient Care Overview  Goal: Plan of Care Review  Outcome: Ongoing (interventions implemented as appropriate)   01/07/19 0456   OTHER   Outcome Summary Pt. HR remained 90's-100's, Low-grade fever of 99.5, Tachypnic with rate up to 30. Pt. requiring PRN dilaudid for pain management, and PRN zofran for nausea. No additional output came from the old MADDY site. Will continue to monitor closely.    Coping/Psychosocial   Plan of Care Reviewed With patient;spouse;family   Plan of Care Review   Progress no change       Problem: Fall Risk (Adult)  Goal: Absence of Fall   01/07/19 0456   Fall Risk (Adult)   Absence of Fall achieves outcome       Problem: Skin Injury Risk (Adult)  Goal: Skin Health and Integrity  Outcome: Ongoing (interventions implemented as appropriate)   01/07/19 0456   Skin Injury Risk (Adult)   Skin Health and Integrity making progress toward outcome       Problem: Pain, Acute (Adult)  Goal: Acceptable Pain Control/Comfort Level  Outcome: Ongoing (interventions implemented as appropriate)   01/07/19 0456   Pain, Acute (Adult)   Acceptable Pain Control/Comfort Level unable to achieve outcome

## 2019-01-07 NOTE — PROGRESS NOTES
Continued Stay Note  [facility]     Patient Name: Todd Phillips  MRN: [id number]  Today's Date: 1/7/2019    Admit Date: 12/31/2018    Discharge Plan     Row Name 01/07/19 0909       Plan    Plan  TBD    Patient/Family in Agreement with Plan  yes    Plan Comments  Goal is home c [facility] vs/ [facility]  Continues to have TPN, g-tube, j tube, and a colostomy bag over old MADDY drain site.  May need surgery again.  Not ready for discharge yet.          Discharge Codes    No documentation.       Expected Discharge Date and Time     Expected Discharge Date Expected Discharge Time    Jan 5, 2019             Lico Cadena RN

## 2019-01-07 NOTE — THERAPY RE-EVALUATION
Acute Care - Occupational Therapy Re-Evaluation  [facility]     Patient Name: Todd Phillips  : 1950  MRN: [id number]  Today's Date: 2019  Onset of Illness/Injury or Date of Surgery: 19  Date of Referral to OT: 19  Referring Physician: MD Mayuri    Admit Date: 2018       ICD-10-CM ICD-9-CM   1. Impaired functional mobility, balance, gait, and endurance Z74.09 V49.89   2. Laboratory test Z01.89 V72.60   3. Pancreatic cancer (CMS/HCC) C25.9 157.9   4. 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)   • DM2 (diabetes mellitus, type 2) (CMS/HCC)   • Paroxysmal atrial fibrillation   • Chronic anticoagulation, Eliquis   • Encounter for monitoring flecainide therapy   • Malignant neoplasm of head of pancreas (CMS/HCC)   • Bacteremia due to Escherichia coli   • Biliary obstruction   • Thrombocytopenia (CMS/HCC)   • Anemia due to chemotherapy   • Fever   • Post-operative confusion and somnolence, likely due to oversedation   • Atrial fibrillation with RVR (CMS/HCC)     Past Medical History:   Diagnosis Date   • Antral gastritis    • Asthma    • Atrial fibrillation (CMS/HCC)     treated with oral blood thinner   • Chest pain    • COPD (chronic obstructive pulmonary disease) (CMS/HCC)    • Coronary artery disease     no stents   • Diabetes mellitus (CMS/HCC)     type 2 - checks sugar 4 times per day    • Duodenitis    • Elevated cholesterol    • Emphysema of lung (CMS/HCC)    • Gallbladder disease     removed    • GERD (gastroesophageal reflux disease)    • Hard to intubate     busted lip last time   • Hyperlipidemia    •  Hypertension    • Jaundice    • Kidney stone    • Kidney stones     had lithotripsy and passed 36 kidney stones as well as had one surgically removed.   • Malignant neoplasm of head of pancreas (CMS/HCC) 9/5/2018   • Nausea    • Obstructive sleep apnea on CPAP     compliant with machine    • Palpitations    • Pneumonia 08/2018   • Reflux esophagitis    • Renal failure     stage 3 kidney disease   • Sepsis (CMS/HCC)      Past Surgical History:   Procedure Laterality Date   • BILE DUCT STENT PLACEMENT      [facility] 2 weeks ago    • CARDIAC CATHETERIZATION  11/01/1999   • CARDIOVASCULAR STRESS TEST  09/2012   • CHOLECYSTECTOMY N/A 8/10/2018    Procedure: CHOLECYSTECTOMY LAPAROSCOPIC;  Surgeon: Ronak Downs MD;  Location: [facility] OR;  Service: General   • COLONOSCOPY     • CYSTOSCOPY BLADDER STONE LITHOTRIPSY     • ECHO - CONVERTED  09/2012   • ERCP N/A 8/14/2018    Procedure: ENDOSCOPIC RETROGRADE CHOLANGIOPANCREATOGRAPHY WITH PAPILLOTOMY;  Surgeon: Scot Su MD;  Location: [facility] OR;  Service: Gastroenterology   • ERCP N/A 10/1/2018    Procedure: ENDOSCOPIC RETROGRADE CHOLANGIOPANCREATOGRAPHY;  Surgeon: Jefry Luna MD;  Location:  [facility];  Service: Gastroenterology   • KIDNEY STONE SURGERY     • KIDNEY STONE SURGERY      open abdominal surgery   • PORTACATH PLACEMENT Right 10/23/2018    Procedure: INSERTION OF PORTACATH;  Surgeon: Ronak Downs MD;  Location: [facility] OR;  Service: General   • TONSILLECTOMY     • UPPER GASTROINTESTINAL ENDOSCOPY  08/30/2012   • WHIPPLE PROCEDURE N/A 12/31/2018    Procedure: WHIPPLE PROCEDURE, BIOPSY OF LIVER, PORTAL VEIN RESECTION AND REPAIR, G/J TUBE PLACEMENT;  Surgeon: Miquel Brody MD;  Location:  [facility] OR;  Service: General          OT ASSESSMENT FLOWSHEET (last 72 hours)      Occupational Therapy Evaluation     Row Name 01/07/19 8549                   OT Evaluation Time/Intention    Document Type   re-evaluation  -CL        Symptoms Noted During/After Treatment  fatigue  -CL           General Information    Patient Profile Reviewed?  yes  -CL        Onset of Illness/Injury or Date of Surgery  01/07/19  -CL        Referring Physician  MD Mayuri  -CL        Prior Level of Function  -- Please see IE.  -CL        Equipment Currently Used at Home  -- Please see IE.  -CL        Pertinent History of Current Functional Problem  Please see IE for full history. P t/f to the ICU on 01/05 2/2 to worsening resp status. RESUME IPOT orders received.   -CL        Limitations/Impairments  safety/cognitive  -CL        Risks Reviewed  patient and family:;LOB;nausea/vomiting;dizziness;increased discomfort;change in vital signs;lines disloged  -CL        Benefits Reviewed  patient and family:;improve function;increase independence;increase strength;increase balance;decrease pain;increase knowledge  -CL        Barriers to Rehab  medically complex;cognitive status  -CL           Relationship/Environment    Lives With  spouse Please see IE.  -CL           Resource/Environmental Concerns    Current Living Arrangements  home/apartment/condo  -CL           Cognitive Assessment/Intervention- PT/OT    Affect/Mental Status (Cognitive)  low arousal/lethargic;flat/blunted affect  -CL        Orientation Status (Cognition)  oriented to;person;disoriented to;place;time  -CL        Follows Commands (Cognition)  follows one step commands;25-49% accuracy;verbal cues/prompting required;repetition of directions required;increased processing time needed  -CL        Cognitive Function (Cognitive)  safety deficit  -CL        Safety Deficit (Cognitive)  moderate deficit;awareness of need for assistance;safety precautions awareness  -CL        Personal Safety Interventions  fall prevention program maintained;nonskid shoes/slippers when out of bed;supervised activity  -CL           Bed Mobility Assessment/Treatment    Bed Mobility Assessment/Treatment   rolling right;rolling left  -CL        Rolling Left [place] (Bed Mobility)  maximum assist (25% patient effort);2 person assist;verbal cues  -CL        Rolling Right [place] (Bed Mobility)  maximum assist (25% patient effort);2 person assist;verbal cues  -CL        Assistive Device (Bed Mobility)  bed rails;draw sheet  -CL        Comment (Bed Mobility)  Rolling to place lift sling.   -CL           Functional Mobility    Functional Mobility- Ind. Level  not appropriate to assess  -CL           Transfer Assessment/Treatment    Transfer Assessment/Treatment  bed-chair transfer  -CL        Comment (Transfers)  Deferred attempts at STS t/f d/t cognitive status and poor trunk control.   -CL           Bed-Chair Transfer    Bed-Chair [place] (Transfers)  dependent (less than 25% patient effort);2 person assist;verbal cues  -CL        Assistive Device (Bed-Chair Transfers)  mechanical lift/aid  -CL           General ROM    GENERAL ROM COMMENTS  Limited formally assessment d/t cognitive status.  -CL           MMT (Manual Muscle Testing)    General MMT Comments  Limited formally assessment d/t cognitive status.  -CL           Therapeutic Exercise    03576 - OT Therapeutic Activity Minutes  24  -CL           Upper Extremity Supine Therapeutic Exercise    Performed, Supine Upper Extremity (Therapeutic Exercise)  -- Pt resisting movement  -CL           Static Sitting Balance    Level of [place] (Unsupported Sitting, Static Balance)  dependent  -CL        Sitting Position (Unsupported Sitting, Static Balance)  sitting in chair  -CL        Time Able to Maintain Position (Unsupported Sitting, Static Balance)  15 to 30 seconds  -CL           Sensory Assessment/Intervention    Sensory General Assessment  no sensation deficits identified  -CL           Positioning and Restraints    Pre-Treatment Position  in bed  -CL        Post Treatment Position  chair  -CL        In Chair  notified nsg;reclined;call light within  reach;encouraged to call for assist;exit alarm on;with family/caregiver;RUE elevated;LUE elevated;waffle cushion;on mechanical lift sling;legs elevated;heels elevated  -CL           Pain Assessment    Additional Documentation  Pain Scale 2: FACES Pre/Post-Treatment (Group)  -CL           Pain Scale 2: FACES Pre/Post-Treatment    Pain 2: FACES Scale, Pretreatment  2-->hurts little bit  -CL        Pain 2: FACES Scale, Post-Treatment  2-->hurts little bit  -CL        Pain Location 2  abdomen  -CL        Pre/Post Treatment Pain 2 Comment  Tolerated.   -CL        Pain Intervention(s) 2  Repositioned;Ambulation/increased activity  -CL           Clinical Impression (OT)    Date of Referral to OT  01/07/19  -CL        OT Diagnosis  Decreased independence in ADLs.   -CL        Patient/Family Goals Statement (OT Eval)  Return to PLOF.   -CL        Criteria for Skilled Therapeutic Interventions Met (OT Eval)  yes;treatment indicated  -CL        Rehab Potential (OT Eval)  fair, will monitor progress closely  -CL        Therapy Frequency (OT Eval)  daily  -CL        Anticipated Equipment Needs at Discharge (OT)  -- TBA further  -CL        Anticipated Discharge Disposition (OT)  skilled nursing facility  -CL           Vital Signs    Pre Patient Position  Supine  -CL        Intra Patient Position  Sitting  -CL        Post Patient Position  Sitting  -CL           OT Goals    Balance Goal Selection (OT)  balance, OT goal 1  -CL        Additional Documentation  Balance Goal Selection (OT) (Row)  -CL           Transfer Goal 1 (OT)    Activity/Assistive Device (Transfer Goal 1, OT)  sit-to-stand/stand-to-sit;toilet AAD  -CL        [place] Level/Cues Needed (Transfer Goal 1, OT)  maximum assist (25-49% patient effort);verbal cues required  -CL        Time Frame (Transfer Goal 1, OT)  by discharge;long term goal (LTG)  -CL        Progress/Outcome (Transfer Goal 1, OT)  goal revised this date  -CL           Dressing Goal 1 (OT)     Progress/Outcome (Dressing Goal 1, OT)  goal ongoing  -CL           Strength Goal 1 (OT)    Progress/Outcome (Strength Goal 1, OT)  goal ongoing  -CL           Balance Goal 1 (OT)    Activity/Assistive Device (Balance Goal 1, OT)  sitting, static  -CL        [place] Level/Cues Needed (Balance Goal 1, OT)  minimum assist (75% or more patient effort);verbal cues required  -CL        Time Frame (Balance Goal 1, OT)  by discharge;long term goal (LTG)  -CL        Progress/Outcomes (Balance Goal 1, OT)  goal ongoing  -CL           Functional Mobility Goal 1 (OT)    Progress/Outcome (Functional Mobility Goal 1, OT)  goal no longer appropriate  -CL          User Key  (r) = Recorded By, (t) = Taken By, (c) = Cosigned By    Initials Name Effective Dates    CL Martha Blackwood OT 04/03/18 -          Occupational Therapy Education     Title: PT OT SLP Therapies (In Progress)     Topic: Occupational Therapy (In Progress)     Point: ADL training (In Progress)     Description: Instruct learner(s) on proper safety adaptation and remediation techniques during self care or transfers.   Instruct in proper use of assistive devices.    Learning Progress Summary           Patient Acceptance, E, NR by CL at 1/7/2019  3:58 PM    Comment:  Pt educated on appropriate safety precautions, t/f techniques, role of OT, positioning, and benefits of therapy.    Acceptance, E,D,TB, VU,NR by TB at 1/4/2019  3:55 PM    Comment:  Education initiated for benefits of OOB activity/therapy, role of OT, transfer training, HEP and recommendation for rehab at d/c   Family Acceptance, E, NR by CL at 1/7/2019  3:58 PM    Comment:  Pt educated on appropriate safety precautions, t/f techniques, role of OT, positioning, and benefits of therapy.    Acceptance, E,D,TB, VU,NR by TB at 1/4/2019  3:55 PM    Comment:  Education initiated for benefits of OOB activity/therapy, role of OT, transfer training, HEP and recommendation for rehab at d/c                    Point: Home exercise program (Done)     Description: Instruct learner(s) on appropriate technique for monitoring, assisting and/or progressing therapeutic exercises/activities.    Learning Progress Summary           Patient Acceptance, E,D,TB, VU,NR by TB at 1/4/2019  3:55 PM    Comment:  Education initiated for benefits of OOB activity/therapy, role of OT, transfer training, HEP and recommendation for rehab at d/c   Family Acceptance, E,D,TB, VU,NR by TB at 1/4/2019  3:55 PM    Comment:  Education initiated for benefits of OOB activity/therapy, role of OT, transfer training, HEP and recommendation for rehab at d/c                   Point: Precautions (In Progress)     Description: Instruct learner(s) on prescribed precautions during self-care and functional transfers.    Learning Progress Summary           Patient Acceptance, E, NR by CL at 1/7/2019  3:58 PM    Comment:  Pt educated on appropriate safety precautions, t/f techniques, role of OT, positioning, and benefits of therapy.   Family Acceptance, E, NR by CL at 1/7/2019  3:58 PM    Comment:  Pt educated on appropriate safety precautions, t/f techniques, role of OT, positioning, and benefits of therapy.                   Point: Body mechanics (In Progress)     Description: Instruct learner(s) on proper positioning and spine alignment during self-care, functional mobility activities and/or exercises.    Learning Progress Summary           Patient Acceptance, E, NR by CL at 1/7/2019  3:58 PM    Comment:  Pt educated on appropriate safety precautions, t/f techniques, role of OT, positioning, and benefits of therapy.   Family Acceptance, E, NR by CL at 1/7/2019  3:58 PM    Comment:  Pt educated on appropriate safety precautions, t/f techniques, role of OT, positioning, and benefits of therapy.                               User Key     Initials Effective Dates Name Provider Type Discipline     06/08/18 -  Corina Nelson OT Occupational Therapist OT      04/03/18 -  Martha Blackwood OT Occupational Therapist OT                  OT Recommendation and Plan  Outcome Summary/Treatment Plan (OT)  Anticipated Equipment Needs at Discharge (OT): (TBA further)  Anticipated Discharge Disposition (OT): skilled nursing facility  Therapy Frequency (OT Eval): daily  Plan of Care Review  Plan of Care Reviewed With: patient, family  Plan of Care Reviewed With: patient, family  Outcome Summary: OT Re-eval complete s/p ICU t/f. Pt session limited d/t lethargy and limited command following. Pt Depx2 for bed/chair t/f w/ mechanical lift. Recommend cont skilled IPOT POC. Recommend pt DC to SNF.     Outcome Measures     Row Name 01/07/19 1339             How much help from another is currently needed...    Putting on and taking off regular lower body clothing?  1  -CL      Bathing (including washing, rinsing, and drying)  1  -CL      Toileting (which includes using toilet bed pan or urinal)  1  -CL      Putting on and taking off regular upper body clothing  2  -CL      Taking care of personal grooming (such as brushing teeth)  2  -CL      Eating meals  1  -CL      Score  8  -CL         Functional Assessment    Outcome Measure Options  AM-PAC 6 Clicks Daily Activity (OT)  -CL        User Key  (r) = Recorded By, (t) = Taken By, (c) = Cosigned By    Initials Name Provider Type    Martha Luis OT Occupational Therapist          Time Calculation:   Time Calculation- OT     Row Name 01/07/19 2209             Time Calculation- OT    OT Start Time  1339  -CL      OT Received On  01/07/19  -CL      OT Goal Re-Cert Due Date  01/17/19  -CL         Timed Charges    85135 - OT Therapeutic Activity Minutes  24  -CL        User Key  (r) = Recorded By, (t) = Taken By, (c) = Cosigned By    Initials Name Provider Type    Martha Luis OT Occupational Therapist        Therapy Suggested Charges     Code   Minutes Charges    23259 (CPT®) Hc Ot Neuromusc Re Education Ea 15 Min      97245 (CPT®) Hc Ot  Ther Proc Ea 15 Min      65881 (CPT®) Hc Ot Therapeutic Act Ea 15 Min 24 2    74269 (CPT®) Hc Ot Manual Therapy Ea 15 Min      79496 (CPT®) Hc Ot Iontophoresis Ea 15 Min      25377 (CPT®) Hc Ot Elec Stim Ea-Per 15 Min      52706 (CPT®) Hc Ot Ultrasound Ea 15 Min      23310 (CPT®) Hc Ot Self Care/Mgmt/Train Ea 15 Min      Total  24 2        Therapy Charges for Today     Code Description Service Date Service Provider Modifiers Qty    85711540315 HC OT THERAPEUTIC ACT EA 15 MIN 1/7/2019 Martha Blackwood, OT GO 2    62143148100 HC OT RE-EVAL 2 1/7/2019 Martha Blackwood, OT GO 1    01970246202 HC OT THER SUPP EA 15 MIN 1/7/2019 Martha Blackwood OT GO 2               Martha Blackwood OT  1/7/2019

## 2019-01-07 NOTE — PROGRESS NOTES
Adult Nutrition  Assessment/PES    Patient Name:  Todd Phillips  YOB: 1950  MRN: [id number]  Admit Date:  12/31/2018    Assessment Date:  1/7/2019    Comments:  PN support advancing to goal rate. RD will monitor adequacy of delivery for PN  and transition to enteral feeding when appropriate.    Reason for Assessment     Row Name 01/07/19 1810          Reason for Assessment    Reason For Assessment  TF/PN;follow-up protocol;per organizational policy MDR; PN f/u 30 mins     Diagnosis  -- pancreatic cancer, s/p Whipple procedure 12/31, wedge bx of liver tumor, portal vein resection/repair and G/J tube placed. ; SOA, ileus, pneumatosis, portal vein gas     Identified At Risk by Screening Criteria  tube feeding or parenteral nutrition         Nutrition/Diet History     Row Name 01/07/19 1811          Nutrition/Diet History    Typical Food/Fluid Intake  RN reports pt transfered to ICU w/ SOA,ileu, tachycardia. CT of abd done; Pt on PN via L PICC     Factors Affecting Nutritional Intake  altered gastrointestinal function           Labs/Tests/Procedures/Meds     Row Name 01/07/19 1815          Labs/Procedures/Meds    Lab Results Reviewed  reviewed, pertinent        Diagnostic Tests/Procedures    Diagnostic Test/Procedure Reviewed  reviewed, pertinent        Medications    Pertinent Medications Reviewed  reviewed, pertinent     Pertinent Medications Comments  insulin adjusted; PN advancing to goal         Physical Findings     Row Name 01/07/19 1817          Physical Findings    Gastrointestinal  feeding tube     Tubes  other (see comments) PEG/J           Nutrition Prescription Ordered     Row Name 01/07/19 1818          Nutrition Prescription PO    Current PO Diet  NPO        Nutrition Prescription PN    PN Route  PICC     PN Goal Rate (mL/hr)  75 mL/hr     Dextrose Concentration (%)  20 %     Amino Acid Concentration (%)  7 %     Lipid Concentration (%)  20%     Lipid Volume (mL)  200 mL     Lipid Frequency   Daily        Propofol Considerations    Propofol (mL/hr)  0 mL/hr         Evaluation of Received Nutrient/Fluid Intake     Row Name 01/07/19 1818          PN Evaluation    Number of Days PN Evaluated  1 day     PN Average delivery (mL/day)   383 mL/day     PN Average lipid delivery (mL/day)   193 mL/day               Problem/Interventions:  Problem 1     Row Name 01/07/19 1820          Nutrition Diagnoses Problem 1    Problem 1  Altered GI Function     Etiology (related to)  Medical Diagnosis     Gastrointestinal  Other (comment);Ileus s/p whipple, liver biopsy and portal vein resection/repair; PEG/J placement (12/31)     Signs/Symptoms (evidenced by)  PO diet not tolerated;NPO                 Intervention Goal     Row Name 01/07/19 1822          Intervention Goal    General  Nutrition support treatment;Meet nutritional needs for age/condition;Reduce/improve symptoms     TF/PN  Adjust TF/PN         Nutrition Intervention     Row Name 01/07/19 1822          Nutrition Intervention    RD/Tech Action  Follow Tx progress;Care plan reviewd;Recommend/ordered         Nutrition Prescription     Row Name 01/07/19 1822          Nutrition Prescription PN    Parenteral Prescription  PN begin/change     PN Route  PICC     Dextrose Concentration (%)  20 %     Amino Acid Concentration (%)  7.0%     PN Goal Rate (mL/hr)  75 mL/hr     PN Goal Volume (mL)  1800 mL     Lipid Concentration (%)  20% SMOF lipids     Lipid Volume (mL)  200 mL     Lipid Frequency  Daily     New PN Prescription Ordered?  Yes         Education/Evaluation     Row Name 01/07/19 1823          Monitor/Evaluation    Monitor  Per protocol;I&O;Symptoms;Pertinent labs;PN delivery/tolerance           Electronically signed by:  Aster Coleman MS,RD,LD  01/07/19 6:23 PM

## 2019-01-08 LAB
ALBUMIN SERPL-MCNC: 2.64 G/DL (ref 3.2–4.8)
ALBUMIN/GLOB SERPL: 1.3 G/DL (ref 1.5–2.5)
ALP SERPL-CCNC: 181 U/L (ref 25–100)
ALT SERPL W P-5'-P-CCNC: 28 U/L (ref 7–40)
ANION GAP SERPL CALCULATED.3IONS-SCNC: 7 MMOL/L (ref 3–11)
AST SERPL-CCNC: 43 U/L (ref 0–33)
BILIRUB SERPL-MCNC: 0.8 MG/DL (ref 0.3–1.2)
BUN BLD-MCNC: 28 MG/DL (ref 9–23)
BUN/CREAT SERPL: 31.8 (ref 7–25)
CA-I SERPL ISE-MCNC: 1.26 MMOL/L (ref 1.12–1.32)
CALCIUM SPEC-SCNC: 8.1 MG/DL (ref 8.7–10.4)
CANCER AG19-9 SERPL-ACNC: 104 U/ML (ref 0–35)
CHLORIDE SERPL-SCNC: 101 MMOL/L (ref 99–109)
CO2 SERPL-SCNC: 23 MMOL/L (ref 20–31)
CREAT BLD-MCNC: 0.88 MG/DL (ref 0.6–1.3)
DEPRECATED RDW RBC AUTO: 52.3 FL (ref 37–54)
ERYTHROCYTE [DISTWIDTH] IN BLOOD BY AUTOMATED COUNT: 16.2 % (ref 11.3–14.5)
GFR SERPL CREATININE-BSD FRML MDRD: 86 ML/MIN/1.73
GLOBULIN UR ELPH-MCNC: 2.1 GM/DL
GLUCOSE BLD-MCNC: 230 MG/DL (ref 70–100)
GLUCOSE BLDC GLUCOMTR-MCNC: 247 MG/DL (ref 70–130)
GLUCOSE BLDC GLUCOMTR-MCNC: 252 MG/DL (ref 70–130)
GLUCOSE BLDC GLUCOMTR-MCNC: 259 MG/DL (ref 70–130)
GLUCOSE BLDC GLUCOMTR-MCNC: 280 MG/DL (ref 70–130)
HCT VFR BLD AUTO: 23.4 % (ref 38.9–50.9)
HGB BLD-MCNC: 7.9 G/DL (ref 13.1–17.5)
MAGNESIUM SERPL-MCNC: 1.8 MG/DL (ref 1.3–2.7)
MCH RBC QN AUTO: 29.5 PG (ref 27–31)
MCHC RBC AUTO-ENTMCNC: 33.8 G/DL (ref 32–36)
MCV RBC AUTO: 87.3 FL (ref 80–99)
PHOSPHATE SERPL-MCNC: 3 MG/DL (ref 2.4–5.1)
PLATELET # BLD AUTO: 297 10*3/MM3 (ref 150–450)
PMV BLD AUTO: 10.2 FL (ref 6–12)
POTASSIUM BLD-SCNC: 3.3 MMOL/L (ref 3.5–5.5)
POTASSIUM BLD-SCNC: 3.4 MMOL/L (ref 3.5–5.5)
PROT SERPL-MCNC: 4.7 G/DL (ref 5.7–8.2)
RBC # BLD AUTO: 2.68 10*6/MM3 (ref 4.2–5.76)
SODIUM BLD-SCNC: 131 MMOL/L (ref 132–146)
WBC NRBC COR # BLD: 20.45 10*3/MM3 (ref 3.5–10.8)

## 2019-01-08 PROCEDURE — 97164 PT RE-EVAL EST PLAN CARE: CPT

## 2019-01-08 PROCEDURE — 25010000002 HYDROMORPHONE PER 4 MG: Performed by: INTERNAL MEDICINE

## 2019-01-08 PROCEDURE — 94640 AIRWAY INHALATION TREATMENT: CPT

## 2019-01-08 PROCEDURE — 94760 N-INVAS EAR/PLS OXIMETRY 1: CPT

## 2019-01-08 PROCEDURE — 25010000002 LINEZOLID 600 MG/300ML SOLUTION: Performed by: INTERNAL MEDICINE

## 2019-01-08 PROCEDURE — 82330 ASSAY OF CALCIUM: CPT

## 2019-01-08 PROCEDURE — 82962 GLUCOSE BLOOD TEST: CPT

## 2019-01-08 PROCEDURE — 25010000002 MEROPENEM

## 2019-01-08 PROCEDURE — 94799 UNLISTED PULMONARY SVC/PX: CPT

## 2019-01-08 PROCEDURE — 84132 ASSAY OF SERUM POTASSIUM: CPT | Performed by: INTERNAL MEDICINE

## 2019-01-08 PROCEDURE — 25010000003 POTASSIUM CHLORIDE 10 MEQ/100ML SOLUTION: Performed by: HOSPITALIST

## 2019-01-08 PROCEDURE — 84100 ASSAY OF PHOSPHORUS: CPT

## 2019-01-08 PROCEDURE — 83735 ASSAY OF MAGNESIUM: CPT

## 2019-01-08 PROCEDURE — 25010000002 ENOXAPARIN PER 10 MG: Performed by: INTERNAL MEDICINE

## 2019-01-08 PROCEDURE — 99233 SBSQ HOSP IP/OBS HIGH 50: CPT | Performed by: INTERNAL MEDICINE

## 2019-01-08 PROCEDURE — 25010000002 MAGNESIUM SULFATE PER 500 MG OF MAGNESIUM

## 2019-01-08 PROCEDURE — 25010000002 CALCIUM GLUCONATE PER 10 ML

## 2019-01-08 PROCEDURE — 80053 COMPREHEN METABOLIC PANEL: CPT

## 2019-01-08 PROCEDURE — 97530 THERAPEUTIC ACTIVITIES: CPT

## 2019-01-08 PROCEDURE — 85027 COMPLETE CBC AUTOMATED: CPT | Performed by: INTERNAL MEDICINE

## 2019-01-08 PROCEDURE — 25010000002 POTASSIUM CHLORIDE PER 2 MEQ OF POTASSIUM

## 2019-01-08 RX ORDER — METOPROLOL TARTRATE 5 MG/5ML
5 INJECTION INTRAVENOUS ONCE
Status: COMPLETED | OUTPATIENT
Start: 2019-01-08 | End: 2019-01-08

## 2019-01-08 RX ADMIN — ENOXAPARIN SODIUM 90 MG: 100 INJECTION SUBCUTANEOUS at 20:20

## 2019-01-08 RX ADMIN — LINEZOLID 600 MG: 600 INJECTION, SOLUTION INTRAVENOUS at 20:20

## 2019-01-08 RX ADMIN — BUDESONIDE 0.5 MG: 0.5 INHALANT RESPIRATORY (INHALATION) at 20:10

## 2019-01-08 RX ADMIN — IPRATROPIUM BROMIDE AND ALBUTEROL SULFATE 3 ML: 2.5; .5 SOLUTION RESPIRATORY (INHALATION) at 12:31

## 2019-01-08 RX ADMIN — IPRATROPIUM BROMIDE AND ALBUTEROL SULFATE 3 ML: 2.5; .5 SOLUTION RESPIRATORY (INHALATION) at 20:10

## 2019-01-08 RX ADMIN — MEROPENEM 1 G: 1 INJECTION, POWDER, FOR SOLUTION INTRAVENOUS at 08:54

## 2019-01-08 RX ADMIN — HYDROMORPHONE HYDROCHLORIDE 0.5 MG: 1 INJECTION, SOLUTION INTRAMUSCULAR; INTRAVENOUS; SUBCUTANEOUS at 07:06

## 2019-01-08 RX ADMIN — POTASSIUM CHLORIDE 10 MEQ: 10 INJECTION, SOLUTION INTRAVENOUS at 10:16

## 2019-01-08 RX ADMIN — POTASSIUM CHLORIDE 10 MEQ: 10 INJECTION, SOLUTION INTRAVENOUS at 07:01

## 2019-01-08 RX ADMIN — MEROPENEM 1 G: 1 INJECTION, POWDER, FOR SOLUTION INTRAVENOUS at 23:44

## 2019-01-08 RX ADMIN — SODIUM CHLORIDE, PRESERVATIVE FREE 10 ML: 5 INJECTION INTRAVENOUS at 08:55

## 2019-01-08 RX ADMIN — METOPROLOL TARTRATE 5 MG: 5 INJECTION, SOLUTION INTRAVENOUS at 02:03

## 2019-01-08 RX ADMIN — INSULIN HUMAN 2 UNITS: 100 INJECTION, SOLUTION PARENTERAL at 06:06

## 2019-01-08 RX ADMIN — INSULIN HUMAN 6 UNITS: 100 INJECTION, SOLUTION PARENTERAL at 11:29

## 2019-01-08 RX ADMIN — INSULIN HUMAN 2 UNITS: 100 INJECTION, SOLUTION PARENTERAL at 11:28

## 2019-01-08 RX ADMIN — HYDROMORPHONE HYDROCHLORIDE 0.5 MG: 1 INJECTION, SOLUTION INTRAMUSCULAR; INTRAVENOUS; SUBCUTANEOUS at 01:51

## 2019-01-08 RX ADMIN — SMOFLIPID 200 ML: 6; 6; 5; 3 INJECTION, EMULSION INTRAVENOUS at 17:23

## 2019-01-08 RX ADMIN — ACETAMINOPHEN 500 MG: 500 TABLET, FILM COATED ORAL at 15:56

## 2019-01-08 RX ADMIN — MEROPENEM 1 G: 1 INJECTION, POWDER, FOR SOLUTION INTRAVENOUS at 15:56

## 2019-01-08 RX ADMIN — POTASSIUM CHLORIDE 10 MEQ: 10 INJECTION, SOLUTION INTRAVENOUS at 08:54

## 2019-01-08 RX ADMIN — BUDESONIDE 0.5 MG: 0.5 INHALANT RESPIRATORY (INHALATION) at 07:24

## 2019-01-08 RX ADMIN — METOPROLOL TARTRATE 5 MG: 5 INJECTION, SOLUTION INTRAVENOUS at 00:50

## 2019-01-08 RX ADMIN — POTASSIUM CHLORIDE 10 MEQ: 10 INJECTION, SOLUTION INTRAVENOUS at 11:26

## 2019-01-08 RX ADMIN — MEROPENEM 1 G: 1 INJECTION, POWDER, FOR SOLUTION INTRAVENOUS at 00:18

## 2019-01-08 RX ADMIN — LINEZOLID 600 MG: 600 INJECTION, SOLUTION INTRAVENOUS at 08:54

## 2019-01-08 RX ADMIN — IPRATROPIUM BROMIDE AND ALBUTEROL SULFATE 3 ML: 2.5; .5 SOLUTION RESPIRATORY (INHALATION) at 07:25

## 2019-01-08 RX ADMIN — PANTOPRAZOLE SODIUM 40 MG: 40 INJECTION, POWDER, FOR SOLUTION INTRAVENOUS at 06:06

## 2019-01-08 RX ADMIN — METOPROLOL TARTRATE 5 MG: 5 INJECTION, SOLUTION INTRAVENOUS at 17:35

## 2019-01-08 RX ADMIN — CALCIUM GLUCONATE: 94 INJECTION, SOLUTION INTRAVENOUS at 17:24

## 2019-01-08 RX ADMIN — INSULIN HUMAN 6 UNITS: 100 INJECTION, SOLUTION PARENTERAL at 17:37

## 2019-01-08 RX ADMIN — INSULIN HUMAN 6 UNITS: 100 INJECTION, SOLUTION PARENTERAL at 06:07

## 2019-01-08 RX ADMIN — IPRATROPIUM BROMIDE AND ALBUTEROL SULFATE 3 ML: 2.5; .5 SOLUTION RESPIRATORY (INHALATION) at 16:09

## 2019-01-08 RX ADMIN — MICAFUNGIN SODIUM 100 MG: 20 INJECTION, POWDER, LYOPHILIZED, FOR SOLUTION INTRAVENOUS at 19:42

## 2019-01-08 RX ADMIN — MAGNESIUM SULFATE HEPTAHYDRATE 4 G: 40 INJECTION, SOLUTION INTRAVENOUS at 07:01

## 2019-01-08 RX ADMIN — FLECAINIDE ACETATE 50 MG: 50 TABLET ORAL at 20:20

## 2019-01-08 RX ADMIN — FLECAINIDE ACETATE 50 MG: 50 TABLET ORAL at 08:54

## 2019-01-08 RX ADMIN — ENOXAPARIN SODIUM 90 MG: 100 INJECTION SUBCUTANEOUS at 08:54

## 2019-01-08 RX ADMIN — INSULIN HUMAN 6 UNITS: 100 INJECTION, SOLUTION PARENTERAL at 23:45

## 2019-01-08 NOTE — PROGRESS NOTES
Pulmonary/Critical Care Follow-up     LOS: 8 days   Patient Care Team:  Adiel Denney MD as PCP - General  Loida Campbell APRN as Nurse Practitioner (Nurse Practitioner)    Chief Complaint / reason : Pancreatic cancer / medical management of post-operative conditions including diabetes, atrial fibrillation.        Subjective    68-year-old male with a history of biliary tract obstruction who underwent cholecystectomy last August with subsequent ERCP for biliary stricture with stent placement.  He subsequently had an FNA on September 28 which was suspicious for adenocarcinoma in the head of the pancreas.  He had a repeat ERCP on 10/1/2018 and a new stent was placed.  He has been treated multiple times for recurrent bacteremias associated with his biliary obstruction.  He was subsequently treated with neoadjuvant chemotherapy.    The patient underwent a Whipple procedure with wedge biopsy of liver tumor and portal vein resection/lateral repair with gastrojejunostomy tube placement by Dr. Brody on December 31, 2018.  He is transferred to the intensive care unit on January 5 with increasing confusion and tachypnea and a CT scan showing portal venous gas, pneumatosis/ileus and intra-abdominal fluid.    Interval History:   Denies pain.  No significant output from abdominal drain site.  He is alert today.  No fevers or chills.  Had atrial fibrillation overnight, now back in sinus rhythm.  Family is at bedside.     History taken from: Patient, staff.    PMH/FH/Social History were reviewed and updated appropriately in the electronic medical record.     Review of Systems:    Review of 14 systems was completed with positives and pertinent negatives noted in the subjective section.  All other systems reviewed and are negative.         Objective     Vital Signs  Temp:  [98.3 °F (36.8 °C)-100 °F (37.8 °C)] 98.3 °F (36.8 °C)  Heart Rate:  [] 101  Resp:  [22-28] 26  BP: (124-169)/() 139/84  01/07  0701 - 01/08 0700  In: 3456.2 [I.V.:1030]  Out: 2175 [Urine:1475]  Body mass index is 27.37 kg/m².     Physical Exam:     Constitutional:   Alert, cooperative, in no acute distress   Head:   Normocephalic, without obvious abnormality, atraumatic   Eyes:           Lids and lashes normal, conjunctivae and sclerae normal.  No icterus, no pallor, corneas clear, PER   ENMT:  Ears appear intact with no abnormalities noted     No oral lesions, no thrush, oral mucosa moist   Neck:  No adenopathy, supple, trachea midline, no thyromegaly, no JVD   Lungs/Resp:    Normal effort, symmetric chest rise, no crepitus, clear to      auscultation bilaterally, no chest wall tenderness                Heart/CV:   Regular rhythm and normal rate, normal S1 and S2, no            murmur   Abdomen/GI:    Positive bowel sounds, midline wound with staples intact, no masses, no organomegaly, soft, mildly tender, right drain site with minimal serous drainage, non-distended   :    Deferred   Extremities/MSK:  No clubbing or cyanosis.  No edema.  Normal tone.    No deformities.    Pulses:  Pulses palpable and equal bilaterally   Skin:  No bleeding, bruising or rash   Heme/Lymph:  No cervical or supraclavicular adenopathy.   Neurologic:    Psychiatric:    Moves all extremities with no obvious focal motor deficit.  Cranial nerves 2 - 12 grossly intact  Oriented x 3, normal affect.      Results Review:     I reviewed the patient's new clinical results.   Results from last 7 days   Lab Units  01/08/19   0311  01/07/19   0750  01/06/19   2240  01/06/19   0506   SODIUM mmol/L  131*  133   --   131*   POTASSIUM mmol/L  3.3*  3.7  3.7  3.3*   CHLORIDE mmol/L  101  104   --   100   CO2 mmol/L  23.0  21.0   --   22.0   BUN mg/dL  28*  33*   --   36*   CREATININE mg/dL  0.88  0.93   --   1.14   CALCIUM mg/dL  8.1*  7.7*   --   8.0*   BILIRUBIN mg/dL  0.8  0.8   --   1.0   ALK PHOS U/L  181*  117*   --   84   ALT (SGPT) U/L  28  24   --   11   AST (SGOT) U/L   43*  41*   --   18   GLUCOSE mg/dL  230*  202*   --   179*     Results from last 7 days   Lab Units  01/08/19   0311  01/07/19   0750  01/06/19   0506   WBC 10*3/mm3  20.45*  23.91*  18.36*   HEMOGLOBIN g/dL  7.9*  8.7*  9.0*   HEMATOCRIT %  23.4*  25.8*  27.1*   PLATELETS 10*3/mm3  297  297  279   MONOCYTES % %   --    --   5.0     Results from last 7 days   Lab Units  01/06/19   0350   PH, ARTERIAL pH units  7.525*   PO2 ART mm Hg  61.6*   PCO2, ARTERIAL mm Hg  24.9   HCO3 ART mmol/L  20.6     Results from last 7 days   Lab Units  01/08/19   0311  01/07/19   0750  01/06/19   0506   MAGNESIUM mg/dL  1.8  1.9  1.9  2.1   PHOSPHORUS mg/dL  3.0  2.4  2.4  3.4   procalcitonin 1/6: 6.71 -> 1/7: 3.52.         I reviewed the patient's new imaging including images and reports.  CXR 1/6/18:  IMPRESSION:  Low lung volumes with increased markings at the lung bases  Bilaterally.    CT Abd/pelvis 1/5/19:  IMPRESSION:  There are small bilateral pleural effusions. There is a  fluid collection seen surrounding the liver and spleen containing air in  the fluid collection around the liver. There is air seen scattered  throughout the abdomen which may be postoperative. There is air  identified within the mesenteric vessels with findings suggesting  pneumatosis throughout scattered small bowel loops within the mid  abdomen. Fluid throughout the colon suggesting clinical presentation of  diarrhea. Gastrostomy tube is in satisfactory position.      Medication Review:     budesonide 0.5 mg Nebulization BID - RT   enoxaparin 1 mg/kg Subcutaneous Q12H   Fat Emulsion Fish Oil Based 200 mL Intravenous Q24H   flecainide 50 mg Oral Q12H   insulin regular 0-9 Units Subcutaneous Q6H   insulin regular 2 Units Subcutaneous Q6H   ipratropium-albuterol 3 mL Nebulization 4x Daily - RT   Linezolid 600 mg Intravenous Q12H   meropenem 1 g Intravenous Q8H   micafungin (MYCAMINE)  mg Intravenous Q24H   pantoprazole 40 mg Intravenous Q AM    Scopolamine 1 patch Transdermal Q72H   sodium chloride 10 mL Intravenous Q12H       Adult Central 2-in-1 TPN  Last Rate: 75 mL/hr at 01/07/19 1741   Adult Central 2-in-1 TPN     Pharmacy to Dose TPN         Assessment/Plan       Malignant neoplasm of head of pancreas (CMS/HCC)    Hyperlipidemia    COPD (chronic obstructive pulmonary disease) (CMS/HCC)    Essential hypertension    Gastroesophageal reflux disease without esophagitis    DM2 (diabetes mellitus, type 2) (CMS/HCC)    Paroxysmal atrial fibrillation    Chronic anticoagulation, Eliquis    Post-operative confusion and somnolence, likely due to oversedation    Atrial fibrillation with RVR (CMS/HCC)    Adult necrotizing enterocolitis (CMS/HCC)    Encephalopathy    67 YO with h/o DMII, CKD3, GERD, PAF on Eliquis, locally advanced pancreatic cancer with zulay-adjuvant chemotherapy admitted post Pancreaticoduodenectomy, wedge biopsy of liver, portal vein resection and lateral repair and GJ tube by Dr. Brody on 12/31/18.      Moved to ICU on 1/5/18 due to concerns of breathing difficulty, encephalopathy and findings of pneumatosis, portal venous gas and ileus on CT abdomen/pelvis and concern for sepsis.      Plan:  1. Lovenox for anticoagulation for atrial fibrilation.  Eventually transition back to Eliquis when okay with surgery.   2. Antibiotics per ID.   3. Increase scheduled regular insulin.  Continue correction insulin.   4. Continue TPN.   5. Continue flecainide.   6. Follow up cultures (negative to date).   7. Judicious use of narcotics for pain management.    8. PT/OT.  9. Replace electrolytes.     Okay to floor from pulmonary standpoint.      Gerardo Messina MD  01/08/19  12:37 PM    *. Please note that portions of this note were completed with a voice recognition program. Efforts were made to edit the dictations, but occasionally words are mistranscribed.

## 2019-01-08 NOTE — PROGRESS NOTES
Pharmacy Parenteral Nutrition Evaluation  Todd Phillips is a  68 y.o. male receiving TPN.     Indication: Necrotizing enterocolitis  Consulting Physician:  Dr Brody    Labs  Results from last 7 days   Lab Units  01/08/19   0311 01/07/19   0750  01/06/19   2240  01/06/19   0506   SODIUM mmol/L  131*  133   --   131*   POTASSIUM mmol/L  3.3*  3.7  3.7  3.3*   CHLORIDE mmol/L  101  104   --   100   CO2 mmol/L  23.0  21.0   --   22.0   BUN mg/dL  28*  33*   --   36*   CREATININE mg/dL  0.88  0.93   --   1.14   CALCIUM mg/dL  8.1*  7.7*   --   8.0*   BILIRUBIN mg/dL  0.8  0.8   --   1.0   ALK PHOS U/L  181*  117*   --   84   ALT (SGPT) U/L  28  24   --   11   AST (SGOT) U/L  43*  41*   --   18   GLUCOSE mg/dL  230*  202*   --   179*     Results from last 7 days   Lab Units  01/08/19   0311 01/07/19   0750  01/06/19   1406  01/06/19   0506   MAGNESIUM mg/dL  1.8  1.9  1.9   --   2.1   PHOSPHORUS mg/dL  3.0  2.4  2.4   --   3.4   PREALBUMIN mg/dL   --    --   <5.0*   --      Results from last 7 days   Lab Units  01/08/19   0311 01/07/19   0750  01/06/19   0506   WBC 10*3/mm3  20.45*  23.91*  18.36*   HEMOGLOBIN g/dL  7.9*  8.7*  9.0*   HEMATOCRIT %  23.4*  25.8*  27.1*   PLATELETS 10*3/mm3  297  297  279     Triglycerides   Date Value Ref Range Status   01/07/2019 133 0 - 150 mg/dL Final     estimated creatinine clearance is 101.1 mL/min (by C-G formula based on SCr of 0.88 mg/dL).    Intake & Output (last 3 days)       01/05 0701 - 01/06 0700 01/06 0701 - 01/07 0700 01/07 0701 - 01/08 0700 01/08 0701 - 01/09 0700    P.O. 0       I.V. (mL/kg) 5552.1 (60.7) 2336.2 (25.5) 1030 (11.3)     Other 186       NG/  90     IV Piggyback 349.8 1051 457.6     TPN  576.2 1878.6     Total Intake(mL/kg) 6628.9 (72.4) 3963.4 (43.3) 3456.2 (37.8)     Urine (mL/kg/hr) 525 (0.2) 1365 (0.6) 1475 (0.7)     Emesis/NG output  400 700     Drains 1195 200      Stool 0  0     Wound   0     Total Output 1720 1965 2175     Net +4908.9  +1998.4 +1281.2             Urine Unmeasured Occurrence 2 x       Stool Unmeasured Occurrence 3 x  1 x           Dietitian Recommendations     Current TPN Regimen Recommendation:  Dextrose 20% / Amino Acid 7% at goal rate of 75mL/hr.  20% SMOF Lipid Emulsion 200mL every 24 hours.    Assessment/Plan:  1. Continuing TPN for necrotizing enterocolitis. Macronutrients per dietary recommendation as listed above. Goal rate of 75mL/hr.  2. Will continue with standard electrolytes and 1:1 acid base. Electrolyte replacements and sliding scale insulin are available. Potassium and mag replaced this AM.   3. Pharmacy will continue to follow and adjust as indicated.     Thanks,   Dhara Brooks, PharmD  Pharmacy Resident  1/8/2019  9:49 AM

## 2019-01-08 NOTE — PLAN OF CARE
Problem: Patient Care Overview  Goal: Plan of Care Review  Outcome: Ongoing (interventions implemented as appropriate)   01/08/19 6591   OTHER   Outcome Summary PT re-eval completed. Pt continues to demonstrate generalized weakness and decreased indep re: functional mobility, warranting further skilled PT services to promote PLOF. Limited today by lethargy; goals revised to reflect current functional status. Able to perform stand-pivot t/f to recliner with max x2 A. Recommend SNF at d/c.    Coping/Psychosocial   Plan of Care Reviewed With patient;spouse

## 2019-01-08 NOTE — DISCHARGE PLACEMENT REQUEST
"Jose Ya (68 y.o. Male)     Yue Cadena RN  Case Management  [facility]  [phone]        Date of Birth Social Security Number Address Home Phone MRN    1950  [address]  [place] [address] [phone] [id number]    Judaism Marital Status          Other        Admission Date Admission Type Admitting Provider Attending Provider Department, Room/Bed    12/31/18 Urgent Miquel Brody MD McKenzie, Shaun Patrick, MD [facility] 2B ICU, N232/1    Discharge Date Discharge Disposition Discharge Destination                       Attending Provider:  Miquel Brody MD    Allergies:  Contrast Dye, Fentanyl, Chlorhexidine    Isolation:  None   Infection:  None   Code Status:  CPR    Ht:  182.9 cm (72\")   Wt:  91.5 kg (201 lb 12.8 oz)    Admission Cmt:  None   Principal Problem:  Malignant neoplasm of head of pancreas (CMS/HCC) [C25.0] More...                 Active Insurance as of 12/31/2018     Primary Coverage     Payor Plan Insurance Group Employer/Plan Group    MEDICARE MEDICARE A & B      Payor Plan Address Payor Plan Phone Number Payor Plan Fax Number Effective Dates    [address] [phone]  4/1/2005 - None Entered    [facility] [id number]       Subscriber Name Subscriber Birth Date Member ID       JOSE YA 1950 [id number]           Secondary Coverage     Payor Plan Insurance Group Employer/Plan Group    [employer] - ASA [id number]     Payor Plan Address Payor Plan Phone Number Payor Plan Fax Number Effective Dates    [address]   6/16/2016 - None Entered    [facility] [id number]       Subscriber Name Subscriber Birth Date Member ID       JOSE YA [id number]                 Emergency Contacts      (Rel.) Home Phone Work Phone Mobile Phone    Emmy Ya (Spouse) [phone] -- [phone]    Carlos Ya (Son) -- -- [phone]    Richard Ya (Son) -- -- [phone]    "            History & Physical      Anjali Swanson PA at 12/31/2018  6:56 AM     Attestation signed by Miquel Brody MD at 12/31/2018  7:46 AM    Pt seen, examined, plan reviewed and agree with above                  Pre-Op H&P (See Recent Office Note Attached for Full H&P)    History and physical note from office, dated 12/19/18, reviewed and updated with the following, otherwise there are no changes in H&P:      Review of Systems:  General ROS:  no fever, chills, rashes, No change since last office visit  Cardiovascular ROS: no chest pain or dyspnea on exertion. Cardiac clearance dated 12/26/2018 ([facility] CARDIOLOGY Dr. Denney)  on chart.   Respiratory ROS: no cough, shortness of breath, or wheezing. Uses C-pap with sleep.    Immunization History:   Influenza:  Yes   Pneumococcal:  Yes    Tetanus:  Yes     Meds:    Current Facility-Administered Medications on File Prior to Encounter   Medication Dose Route Frequency Provider Last Rate Last Dose   • sodium chloride 0.9 % infusion  - ADS Override Pull            • sodium chloride 0.9 % infusion  - ADS Override Pull              Current Outpatient Medications on File Prior to Encounter   Medication Sig Dispense Refill   • cholecalciferol (VITAMIN D3) 1000 units tablet Take 1,000 Units by mouth Daily.     • dronabinol (MARINOL) 5 MG capsule Take 1 capsule by mouth 2 (Two) Times a Day Before Meals. 60 capsule 2   • flecainide (TAMBOCOR) 50 MG tablet Take 1 tablet by mouth Every 12 (Twelve) Hours. 60 tablet 0   • FLUoxetine (PROzac) 20 MG capsule Take 20 mg by mouth 2 (Two) Times a Day.     • lansoprazole (PREVACID) 30 MG capsule Take 1 capsule by mouth twice daily (With Breakfast & Dinner). 60 capsule 0   • linaclotide (LINZESS) 145 MCG capsule capsule Take 1 capsule by mouth Every Morning Before Breakfast. 30 capsule 3   • mometasone (ASMANEX) 220 MCG/INH inhaler Inhale 2 puffs Every Night.     • montelukast (SINGULAIR) 10 MG tablet  "Take 10 mg by mouth Daily.     • Morphine (MS CONTIN) 30 MG 12 hr tablet Take 1 tablet by mouth Every 12 (Twelve) Hours. 60 tablet 0   • ondansetron (ZOFRAN) 8 MG tablet Take 1 tablet by mouth Every 8 (Eight) Hours As Needed for Nausea or Vomiting. 60 tablet 3   • promethazine (PHENERGAN) 12.5 MG tablet Take 1 tablet by mouth Every 6 (Six) Hours As Needed for Nausea or Vomiting. 90 tablet 3   • Tiotropium Bromide-Olodaterol 2.5-2.5 MCG/ACT aerosol solution Inhale 2 puffs Daily.     • albuterol (PROVENTIL HFA;VENTOLIN HFA) 108 (90 BASE) MCG/ACT inhaler Inhale 2 puffs Every 4 (Four) Hours As Needed for Wheezing or Shortness of Air.     • albuterol (PROVENTIL) (2.5 MG/3ML) 0.083% nebulizer solution Take 2.5 mg by nebulization 2 (Two) Times a Day As Needed for Wheezing or Shortness of Air.     • apixaban (ELIQUIS) 5 MG tablet tablet Take 1 tablet by mouth Every 12 (Twelve) Hours. (Patient taking differently: Take 5 mg by mouth Every 12 (Twelve) Hours. Not instructed to stop pt states) 60 tablet 0   • colchicine 0.6 MG tablet Take 1 tablet by mouth Daily. 90 tablet 2   • LORazepam (ATIVAN) 0.5 MG tablet Take one to two tablets by mouth every 4 to 6 hours for nausea. (Patient taking differently: Take 0.5 mg by mouth Every 6 (Six) Hours As Needed for Anxiety. Take one to two tablets by mouth every 4 to 6 hours for nausea.) 120 tablet 0   • metroNIDAZOLE (FLAGYL) 500 MG tablet Take 1 tablet by mouth Every 12 (Twelve) Hours for 10 days. 20 tablet 0   • nitroglycerin (NITROSTAT) 0.4 MG SL tablet Place 0.4 mg under the tongue as needed for chest pain.     • oxyCODONE (ROXICODONE) 5 MG immediate release tablet Take 5 mg by mouth Every 4 (Four) Hours As Needed for Moderate Pain .     • polyethylene glycol (MIRALAX) packet Take 17 g by mouth Daily As Needed.         Vital Signs:  /89 (BP Location: Right arm)   Pulse 105   Temp 98.5 °F (36.9 °C) (Temporal)   Resp 18   Ht 182.9 cm (72\")   Wt 91.5 kg (201 lb 12.8 oz)   " SpO2 95%   BMI 27.37 kg/m²      Physical Exam:    CV:  S1S2 regular rate and rhythm, no murmur               Resp:  Clear to auscultation; respirations regular, even and unlabored    Results Review:    I reviewed the patient's new clinical results.    Cancer Staging (if applicable)  Cancer Patient:  x  yes __no __unknown; If yes, clinical stage T:__ N:__M:__, stage group or __N/A    Assessment/Plan:  Malignant neoplasm of head of pancreas   /  [place]      Anjali NORBERT Concepcion  2018   7:11 AM      Electronically signed by Miquel Brody MD at 2018  7:46 AM       Clinic-Administered Medications       Dose Frequency Start End    sodium chloride 0.9 % infusion  - ADS Override Pull   10/23/2018     Notes to Pharmacy: Created by cabinet override    sodium chloride 0.9 % infusion  - ADS Override Pull   10/30/2018     Notes to Pharmacy: Created by cabinet override      Hospital Medications (active)       Dose Frequency Start End    acetaminophen (TYLENOL) tablet 500 mg 500 mg Every 4 Hours PRN 2019     Sig - Route: Take 1 tablet by mouth Every 4 (Four) Hours As Needed for Mild Pain  or Fever. - Oral    Adult Central 2-in-1 TPN  Continuous TPN 2019    Sig - Route: Infuse  into a venous catheter Continuous. - Intravenous    Adult Central 2-in-1 TPN  Continuous TPN 2019    Sig - Route: Infuse  into a venous catheter Continuous. - Intravenous    Adult Cyclic 2-in-1 TPN  Cyclic TPN - See Admin Instructions 2019    Sig - Route: Infuse  into a venous catheter See Administration Instructions. - Intravenous    Notes to Pharmacy: [id number]  [facility] [phone]  : 1950    budesonide (PULMICORT) nebulizer solution 0.5 mg 0.5 mg 2 Times Daily - RT 2018     Sig - Route: Take 2 mL by nebulization 2 (Two) Times a Day. - Nebulization    enoxaparin (LOVENOX) syringe 90 mg 1 mg/kg × 91.5 kg Every 12 Hours Scheduled 2019     Sig - Route: Inject 0.9 mL under  "the skin into the appropriate area as directed Every 12 (Twelve) Hours. - Subcutaneous    Fat Emulsion Fish Oil Based (SMOFLIPID) 20 % emulsion 200 mL 200 mL Every 24 Hours 1/7/2019     Sig - Route: Infuse 200 mL into a venous catheter Daily. - Intravenous    flecainide (TAMBOCOR) tablet 50 mg 50 mg Every 12 Hours Scheduled 1/7/2019     Sig - Route: Take 1 tablet by mouth Every 12 (Twelve) Hours. - Oral    HYDROmorphone (DILAUDID) injection 0.5 mg 0.5 mg Every 2 Hours PRN 1/5/2019 1/12/2019    Sig - Route: Infuse 0.5 mL into a venous catheter Every 2 (Two) Hours As Needed for Severe Pain . - Intravenous    insulin regular (humuLIN R,novoLIN R) injection 0-9 Units 0-9 Units Every 6 Hours Scheduled 1/6/2019     Sig - Route: Inject 0-9 Units under the skin into the appropriate area as directed Every 6 (Six) Hours. - Subcutaneous    insulin regular (humuLIN R,novoLIN R) injection 2 Units 2 Units Every 6 Hours Scheduled 1/7/2019     Sig - Route: Inject 2 Units under the skin into the appropriate area as directed Every 6 (Six) Hours. - Subcutaneous    ipratropium-albuterol (DUO-NEB) nebulizer solution 3 mL 3 mL 4 Times Daily - RT 1/5/2019     Sig - Route: Take 3 mL by nebulization 4 (Four) Times a Day. - Nebulization    Linezolid (ZYVOX) 600 mg 300 mL 600 mg Every 12 Hours Scheduled 1/7/2019 1/17/2019    Sig - Route: Infuse 300 mL into a venous catheter Every 12 (Twelve) Hours. - Intravenous    Magnesium Sulfate 2 gram / 50mL Infusion (GIVE X 3 BAGS TO EQUAL 6GM TOTAL DOSE) - Mg 1.1 - 1.5 mg/dl 2 g As Needed 1/1/2019     Sig - Route: Infuse 50 mL into a venous catheter As Needed (See Administration Instructions). - Intravenous    Linked Group 1:  \"Or\" Linked Group Details        Magnesium Sulfate 2 gram Bolus, followed by 8 gram infusion (total Mg dose 10 grams)- Mg less than or equal to 1mg/dL 2 g As Needed 1/1/2019     Sig - Route: Infuse 50 mL into a venous catheter As Needed (See Administration Instructions). - " "Intravenous    Linked Group 1:  \"Or\" Linked Group Details        Magnesium Sulfate 4 gram infusion- Mg 1.6-1.9 mg/dL 4 g As Needed 1/1/2019     Sig - Route: Infuse 100 mL into a venous catheter As Needed (See Administration Instructions). - Intravenous    Linked Group 1:  \"Or\" Linked Group Details        meropenem (MERREM) 1 g/100 mL 0.9% NS VTB (mbp) 1 g Every 8 Hours 1/6/2019 1/12/2019    Sig - Route: Infuse 100 mL into a venous catheter Every 8 (Eight) Hours. - Intravenous    metoprolol tartrate (LOPRESSOR) injection 5 mg 5 mg Every 4 Hours PRN 1/5/2019     Sig - Route: Infuse 5 mL into a venous catheter Every 4 (Four) Hours As Needed for Heart Rate (HR > 120). - Intravenous    metoprolol tartrate (LOPRESSOR) injection 5 mg 5 mg Once 1/8/2019 1/8/2019    Sig - Route: Infuse 5 mL into a venous catheter 1 (One) Time. - Intravenous    micafungin 100 mg/100 mL 0.9% NS IVPB (mbp) 100 mg Every 24 Hours 1/5/2019 1/12/2019    Sig - Route: Infuse 100 mL into a venous catheter Daily. - Intravenous    naloxone (NARCAN) injection 0.1 mg 0.1 mg Every 5 Minutes PRN 12/31/2018     Sig - Route: Infuse 0.25 mL into a venous catheter Every 5 (Five) Minutes As Needed for Opioid Reversal or Respiratory Depression (see administration instructions). - Intravenous    nitroglycerin (NITROSTAT) SL tablet 0.4 mg 0.4 mg As Needed 12/31/2018     Sig - Route: Place 1 tablet under the tongue As Needed for Chest Pain. - Sublingual    ondansetron (ZOFRAN) injection 4 mg 4 mg Every 6 Hours PRN 12/31/2018     Sig - Route: Infuse 2 mL into a venous catheter Every 6 (Six) Hours As Needed for Nausea or Vomiting. - Intravenous    pantoprazole (PROTONIX) injection 40 mg 40 mg Every Early Morning 1/6/2019     Sig - Route: Infuse 10 mL into a venous catheter Every Morning. - Intravenous    Pharmacy to Dose TPN  Continuous PRN 1/6/2019     Sig - Route: Continuous As Needed for Consult. - Does not apply    potassium chloride (KLOR-CON) packet 40 mEq 40 " "mEq As Needed 1/3/2019     Sig - Route: Take 40 mEq by mouth As Needed (potassium replacement, see admin instructions). - Oral    Linked Group 2:  \"Or\" Linked Group Details        potassium chloride (MICRO-K) CR capsule 40 mEq 40 mEq As Needed 1/3/2019     Sig - Route: Take 4 capsules by mouth As Needed (potassium replacement.  see admin instructions). - Oral    Linked Group 2:  \"Or\" Linked Group Details        potassium chloride 10 mEq in 100 mL IVPB 10 mEq Every 1 Hour PRN 1/3/2019     Sig - Route: Infuse 100 mL into a venous catheter Every 1 (One) Hour As Needed (potassium protocol PERIPHERAL - see admin instructions). - Intravenous    Linked Group 2:  \"Or\" Linked Group Details        Scopolamine (TRANSDERM-SCOP) 1.5 MG/3DAYS patch 1 patch 1 patch Every 72 Hours 1/1/2019     Sig - Route: Place 1 patch on the skin as directed by provider Every 72 (Seventy-Two) Hours. - Transdermal    sodium chloride 0.9 % flush 10 mL 10 mL Every 12 Hours Scheduled 1/6/2019     Sig - Route: Infuse 10 mL into a venous catheter Every 12 (Twelve) Hours. - Intravenous    Cosign for Ordering: Accepted by Fermin Cabrera MD on 1/7/2019  9:41 AM    sodium chloride 0.9 % flush 10 mL 10 mL As Needed 1/6/2019     Sig - Route: Infuse 10 mL into a venous catheter As Needed for Line Care (After Medication Administration). - Intravenous    Cosign for Ordering: Accepted by Fermin Cabrera MD on 1/7/2019  9:41 AM    heparin (porcine) 5000 UNIT/ML injection 5,000 Units (Discontinued) 5,000 Units Every 8 Hours Scheduled 1/5/2019 1/7/2019    Sig - Route: Inject 1 mL under the skin into the appropriate area as directed Every 8 (Eight) Hours. - Subcutaneous    lactated ringers infusion (Discontinued) 75 mL/hr Continuous 1/5/2019 1/7/2019    Sig - Route: Infuse 75 mL/hr into a venous catheter Continuous. - Intravenous             Physician Progress Notes (last 24 hours) (Notes from 1/7/2019  9:52 AM through 1/8/2019  9:52 AM) "      Scot Higgins MD at 1/8/2019  9:20 AM          [facility] Progress Note        Antibiotics:  Anti-Infectives (From admission, onward)    Ordered     Dose/Rate Route Frequency Start Stop    01/07/19 0942  Linezolid (ZYVOX) 600 mg 300 mL     Ordering Provider:  Scot Higgins MD    600 mg  300 mL/hr over 60 Minutes Intravenous Every 12 Hours Scheduled 01/07/19 1100 01/17/19 0859    01/06/19 1306  meropenem (MERREM) 1 g/100 mL 0.9% NS VTB (mbp)     Ordering Provider:  Aletha Alvarez, RPH    1 g  over 3 Hours Intravenous Every 8 Hours 01/06/19 1600 01/12/19 2359    01/05/19 1820  micafungin 100 mg/100 mL 0.9% NS IVPB (mbp)     Ordering Provider:  Fermin Cbarera MD    100 mg  over 60 Minutes Intravenous Every 24 Hours 01/05/19 2000 01/12/19 1959 01/05/19 1859  meropenem (MERREM) 1 g/100 mL 0.9% NS VTB (mbp)     Ordering Provider:  Levi Bustos, RPH    1 g  over 30 Minutes Intravenous Once 01/05/19 1945 01/05/19 2039 01/05/19 1859  vancomycin 2250 mg/500 mL 0.9% NS IVPB ([facility])     Ordering Provider:  Levi Bustos, RPH    2,250 mg Intravenous Once 01/05/19 1945 01/05/19 2009 01/01/19 0936  piperacillin-tazobactam (ZOSYN) 3.375 g in iso-osmotic dextrose 50 ml (premix)     Ordering Provider:  Miquel Brody MD    3.375 g  over 30 Minutes Intravenous Once 01/01/19 1030 01/01/19 1209    12/31/18 0632  ceFAZolin in dextrose (ANCEF) IVPB solution 2 g     Ordering Provider:  Miquel Brody MD    2 g  over 30 Minutes Intravenous Once 12/31/18 0634 12/31/18 0800    12/31/18 0632  metroNIDAZOLE (FLAGYL) IVPB 500 mg     Ordering Provider:  Miquel Brody MD    500 mg  over 60 Minutes Intravenous Once 12/31/18 0634 12/31/18 0830          CC: fatigue, abd pain    HPI:    Patient is a 68 y.o.  Yr old male with history of biliary tract obstruction, admitted to [facility] multiple times in the last 2 months including August 10 until August 16, underwent  cholecystectomy with pathology suggesting chronic cholecystitis, had ERCP on August 14 with biliary stricture/proximal duct dilation and had stent placement.  He was readmitted August 21, 2018 until August 25, 2018 with acute sepsis, Klebsiella bacteremia, discharged with oral antibiotics.  Readmitted September 12, 2018 until September 17, 2018 with ESBL Escherichia coli bacteremia seen by infectious disease in [place] and treated with Invanz, duration of therapy unclear but approximately until September 24.  During that period of time, concern for malignancy mentioned in notes and referred to [facility] for further biopsy, done September 28.  Presented once again to [facility] emergency room September 30, 2018 with acute onset abdominal pain/nausea/vomiting.  Blood cultures positive again with  Klebsiella species and  Transferred to [facility]. 10/1/18 ERCP with evidence for obstruction/purulence and new stent placed; He was treated with rocephin/flagyl and last seen by us early October; after that, he had more definitive diagnosis of pancreatic cancer and treated with neoadjuvant chemotherapy and right chest port placement.  In addition he required TPN.  Family not aware of any other specific microbiologic diagnosis since October; he was admitted December 31 and underwent pancreaticoduodenectomy, wedge biopsy of liver tumor and portal vein resection/lateral repair with gastrojejunostomy tube placement.  Moved to ICU on January 5 with concern regarding increased confusion/Tachypnea, abnormal CT scan with pneumatosis/ileus and portal venous gas in addition to intra-abdominal fluid per description of radiology.  Chest x-ray January 6 with low lung volumes and increased markings at lung bases bilaterally but no focal parenchymal opacification per radiology.  Head CT no acute intracranial abnormality.     1/8/19 Patient   sleepy/Confused and does not cooperate with a history or  "review of systems.  He answers yes/no to some simple questions but unclear reliability.  Nursing reports left arm PICC line new on January 6 and stable, chronic right port in the chest with no new redness/swelling or change there.  Indwelling Hassan catheter, G/J-tube, and ostomy bag over prior MADDY drain site; MADDY drain removed January 6.  No stool output since admission per nursing.  No rash.  On nasal cannula oxygen with no productive cough.  Patient's alertness better January 7-8 compared to January 6 per nursing. LG fever at 100 last 24 hours     Patient does not cooperate with ROS otherwise      ROS:        1/8/19 as above, patient confused and not cooperative with review of systems          PE:   /87   Pulse 90   Temp 99.9 °F (37.7 °C) (Core)   Resp 26   Ht 182.9 cm (72\")   Wt 91.5 kg (201 lb 12.8 oz)   SpO2 93%   BMI 27.37 kg/m²        GENERAL: He opens his eyes, follows simple commands but not oriented.  Nasal cannula oxygen  HEENT: Normocephalic, atraumatic.  PERRL. EOMI. No conjunctival injection. No icterus. Oropharynx clear without evidence of thrush or exudate. No evidence of peridontal disease.    NECK: Supple without nuchal rigidity. No mass.  LYMPH: No cervical, axillary or inguinal lymphadenopathy.  HEART: RRR; No murmur, rubs, gallops.   LUNGS: Diminished at bases bilaterally without wheezing, rales, rhonchi. Normal respiratory effort. Nonlabored. No dullness.  ABDOMEN: Soft, tender and distended. Positive bowel sounds managed. No rebound or guarding. NO mass or HSM.  Ostomy bag noted at right abdomen but no active drainage or fluid in bag.  Surgical site noted with no redness induration or warmth.  No mass bulge or fluctuance.  No crepitus or bulla.  Feeding tube site noted with no redness induration or warmth.  No mass bulge or fluctuance appreciated.  No crepitus or bulla.  No purulence or skin necrosis  EXT:  No cyanosis, clubbing. No cord.  : Genitalia generally unremarkable.  With " Hassan catheter.  MSK: FROM without joint effusions noted arms/legs.    SKIN: Warm and dry without cutaneous eruptions on Inspection/palpation.    NEURO: He moves all 4 extremities spontaneously but he is not cooperate with a detailed motor or sensory exam     Right chest port with no redness induration or warmth.  No crepitus or bulla.  No purulence     Left arm PICC line with no redness or purulence     No peripheral stigmata/phenomena of endocarditis        Laboratory Data    Results from last 7 days   Lab Units  01/08/19   0311  01/07/19   0750  01/06/19   0506   WBC 10*3/mm3  20.45*  23.91*  18.36*   HEMOGLOBIN g/dL  7.9*  8.7*  9.0*   HEMATOCRIT %  23.4*  25.8*  27.1*   PLATELETS 10*3/mm3  297  297  279     Results from last 7 days   Lab Units  01/08/19   0311   SODIUM mmol/L  131*   POTASSIUM mmol/L  3.3*   CHLORIDE mmol/L  101   CO2 mmol/L  23.0   BUN mg/dL  28*   CREATININE mg/dL  0.88   GLUCOSE mg/dL  230*   CALCIUM mg/dL  8.1*     Results from last 7 days   Lab Units  01/08/19   0311   ALK PHOS U/L  181*   BILIRUBIN mg/dL  0.8   ALT (SGPT) U/L  28   AST (SGOT) U/L  43*         Results from last 7 days   Lab Units  01/06/19   1406   CRP mg/dL  20.71*       Estimated Creatinine Clearance: 101.1 mL/min (by C-G formula based on SCr of 0.88 mg/dL).      Microbiology:      Radiology:  Imaging Results (last 72 hours)     Procedure Component Value Units Date/Time    XR Chest 1 View [[id number]] Collected:  01/06/19 0915     Updated:  01/07/19 1411    Narrative:          EXAMINATION: XR CHEST 1 VW - 1/6/2019     INDICATION: Z01.89-Encounter for other specified special examinations;  C25.9-Malignant neoplasm of pancreas, unspecified; Z74.09-Other reduced  mobility      COMPARISON: 01/05/2019     FINDINGS: Portable chest reveals low lung volumes. Deep line catheter on  the right with tip in the SVC. Mild increased markings at lung bases  bilaterally with degenerative changes seen within the spine.  No  focal  parenchymal opacification present.       Impression:       Low lung volumes with increased markings at the lung bases  bilaterally.     DICTATED:   1/6/2019  EDITED/ls :   1/6/2019      This report was finalized on 1/7/2019 2:09 PM by Dr. Tasha Guzmán MD.       CT Head Without Contrast [[id number]] Collected:  01/06/19 1803     Updated:  01/07/19 1410    Narrative:       EXAMINATION: CT HEAD WO CONTRAST - 1/6/2019     INDICATION:  Z01.89-Encounter for other specified special examinations;  C25.9-Malignant neoplasm of pancreas, unspecified; Z74.09-Other reduced  mobility. Mental status change.     TECHNIQUE: Multiple axial CT imaging is obtained of the head without the  administration of intravenous contrast.     The radiation dose reduction device was turned on for each scan per the  ALARA (As Low as Reasonably Achievable) protocol.     COMPARISON: 11/17/2018     FINDINGS: Atrophy of the brain with low-density area seen in the  periventricular and subcortical white matter suggesting chronic small  vessel ischemic change. No hemorrhage or hydrocephalus. No mass, mass  effect, or midline shift. No abnormal extra-axial fluid collections  identified. The bony structures reveal no evidence of osseous  abnormality. The visualized paranasal sinuses are clear. The mastoid air  cells are patent.       Impression:       Chronic changes seen within the brain with no acute  intracranial abnormality identified.     DICTATED:   1/6/2019  EDITED/ls :   1/6/2019         This report was finalized on 1/7/2019 2:08 PM by Dr. Tasha Guzmán MD.       XR Chest 1 View [[id number]] Collected:  01/05/19 1928     Updated:  01/06/19 1008    Narrative:          EXAMINATION: XR CHEST 1 VW - 1/5/2019     INDICATION:  Z01.89-Encounter for other specified special examinations;  C25.9-Malignant neoplasm of pancreas, unspecified; Z74.09-Other reduced  mobility      COMPARISON: 01/05/2019     FINDINGS: Portable chest reveals deep  line catheter on the right with  tip in the SVC. Mild increased markings at left lung base with small  left pleural effusion. Improvement seen in aeration of the lung bases in  the interval. Degenerative change is seen within the spine. Pulmonary  vascularity is within normal limits.           Impression:       Improvement seen in aeration of the lung bases in the  interval with only minimal right residual markings at the lung bases.     DICTATED:   1/5/2019  EDITED/ls :   1/5/2019      This report was finalized on 1/6/2019 10:06 AM by Dr. Tasha Guzmán MD.       CT Abdomen Pelvis Without Contrast [[id number]] Collected:  01/05/19 1645     Updated:  01/05/19 1655    Narrative:       EXAMINATION: CT ABDOMEN/PELVIS WO CONTRAST - 1/5/2019      INDICATION:  Z01.89-Encounter for other specified special examinations;  C25.9-Malignant neoplasm of pancreas, unspecified; Z74.09-Other reduced  mobility. Abdominal pain.     TECHNIQUE: Multiple axial CT imaging is obtained of the abdomen and  pelvis without the administration of oral or intravenous contrast.     The radiation dose reduction device was turned on for each scan per the  ALARA (As Low as Reasonably Achievable) protocol.     COMPARISON: NONE     FINDINGS: There are small bilateral pleural effusions. Atelectatic  change is seen in the lung bases bilaterally. Extraluminal air is  identified throughout the upper abdomen. There is fluid seen surrounding  the spleen as well as the liver. There is a small to  moderate size  hiatal hernia. There is gastric distention. There are postoperative  changes seen from Whipple procedure. There is a small collection of  fluid identified along the leftward aspect of the liver. There is some  free fluid identified near the sarbjit hepatis. There is a G-tube  identified in satisfactory position. Kidneys and adrenal glands within  normal limits. There is gaseous distention of the small bowel loops  suggesting a postoperative ileus.  There is air identified within the  mesenteric vessels. Findings are concerning for pneumatosis of several  small bowel loops centrally within the abdomen. There is fluid seen  scattered throughout the colon. Mild distention of the colon. There is  some fluid seen within the pelvis.     Pelvis: The pelvic portion of the gastrointestinal tract is within  normal limits. Fluid seen within the colon. The bladder reveals  air-fluid level likely from prior instrumentation. No pelvic adenopathy.  Bony structures reveal no evidence of osseous abnormality.       Impression:       There are small bilateral pleural effusions. There is a  fluid collection seen surrounding the liver and spleen containing air in  the fluid collection around the liver. There is air seen scattered  throughout the abdomen which may be postoperative. There is air  identified within the mesenteric vessels with findings suggesting  pneumatosis throughout scattered small bowel loops within the mid  abdomen. Fluid throughout the colon suggesting clinical presentation of  diarrhea. Gastrostomy tube is in satisfactory position.     DICTATED:   1/5/2019  EDITED/ls :   1/5/2019      This report was finalized on 1/5/2019 4:53 PM by Dr. Tasha Guzmán MD.               Impression:     --Acute postoperative leukocytosis and low-grade fever, approx , at risk for infection multiple sites including abdomen, lung, urine, IV lines, etc.  IV sites look benign at present and Blood cultures negative so far.  Urinalysis with negative leuk esterase/nitrites and UTI generally less likely at present.  Low lung volumes on chest x-ray not unexpected after abdominal surgery, currently no cough and while he is at risk for pneumonia, unable to confirm at present.  Abdomen remains primary concern with enterocolitis, postoperative fluid and on empiric antibiotics with these concerns in mind with Zyvox/Merrem and micafungin.  At risk for MDR organisms with prior history  ESBL and multiple hospitalizations, etc.     --Acute postoperative enterocolitis.  Recent surgery as outlined above in the setting of pancreatic cancer, prior neoadjuvant chemotherapy and tube feeding.  Tube feedings stopped and TPN restarted.  Dr. Brody following closely to determine timing/option/threshold for further surgical intervention or serial imaging/percutaneous drainage etc.  I  d/w Dr Brody     --Pancreatic cancer with prior neoadjuvant chemotherapy, indwelling chronic port and recent surgery as outlined above.     --COPD     --Diabetes type 2     --Chronic anticoagulation with history A. fib and past antiarrhythmic therapy     --Hx ESBL    PLAN:      --IV Zyvox/Merrem and Mycamine     --Check/review labs cultures and scans     --Discussed with family, history per them     --History per nursing as well     --Discussed with microbiology     --Highly complex set of issues with high risk for further serious morbidity and other serious sequela    --d/w Dr Brody     --If not steadily improving, you should give consideration to serial imaging and consideration for further surgical intervention versus interventional radiology options, etc.           Scot Higgins MD  1/8/2019        Electronically signed by Scot Higgins MD at 1/8/2019  9:24 AM     Gerardo Messina MD at 1/7/2019  3:53 PM          Pulmonary/Critical Care Follow-up     LOS: 7 days   Patient Care Team:  Adiel Denney MD as PCP - Loida Harp APRN as Nurse Practitioner (Nurse Practitioner)    Chief Complaint / reason : Pancreatic cancer / medical management of post-operative conditions including diabetes, atrial fibrillation.        Subjective    68-year-old male with a history of biliary tract obstruction who underwent cholecystectomy last August with subsequent ERCP for biliary stricture with stent placement.  He subsequently had an FNA on September 28 which was suspicious for adenocarcinoma in  the head of the pancreas.  He had a repeat ERCP on 10/1/2018 and a new stent was placed.  He has been treated multiple times for recurrent bacteremias associated with his biliary obstruction.  He was subsequently treated with neoadjuvant chemotherapy.    The patient underwent a Whipple procedure with wedge biopsy of liver tumor and portal vein resection/lateral repair with gastrojejunostomy tube placement by Dr. Brody on December 31, 2018.  He is transferred to the intensive care unit on January 5 with increasing confusion and tachypnea and a CT scan showing portal venous gas, pneumatosis/ileus and intra-abdominal fluid.    Interval History:   Patient reports 6 out of 10 abdominal pain currently.  His abdominal drain was apparently discontinued yesterday and he has had no significant output from drain site.  He has been somewhat drowsy.  He has been in sinus rhythm.  No fevers or chills.  Family is at bedside.    History taken from: Patient, staff.    PMH/FH/Social History were reviewed and updated appropriately in the electronic medical record.     Review of Systems:    Review of 14 systems was completed with positives and pertinent negatives noted in the subjective section.  All other systems reviewed and are negative.         Objective     Vital Signs  Temp:  [98.3 °F (36.8 °C)-99.5 °F (37.5 °C)] 99.5 °F (37.5 °C)  Heart Rate:  [] 100  Resp:  [20-30] 22  BP: (120-167)/() 167/96  01/06 0701 - 01/07 0700  In: 3963.4 [I.V.:2336.2]  Out: 1965 [Urine:1365; Drains:200]  Body mass index is 27.37 kg/m².     Physical Exam:     Constitutional:   Drowsy, cooperative, in no acute distress   Head:   Normocephalic, without obvious abnormality, atraumatic   Eyes:           Lids and lashes normal, conjunctivae and sclerae normal.  No icterus, no pallor, corneas clear, PER   ENMT:  Ears appear intact with no abnormalities noted     No oral lesions, no thrush, oral mucosa moist   Neck:  No adenopathy, supple,  trachea midline, no thyromegaly, no JVD   Lungs/Resp:    Normal effort, symmetric chest rise, no crepitus, clear to      auscultation bilaterally, no chest wall tenderness                Heart/CV:   Regular rhythm and normal rate, normal S1 and S2, no            murmur   Abdomen/GI:    Diminished bowel sounds, midline wound with staples intact, no masses, no organomegaly, soft, mildly tender, right drain site with minimal serous drainage, non-distended   :    Deferred   Extremities/MSK:  No clubbing or cyanosis.  No edema.  Normal tone.    No deformities.    Pulses:  Pulses palpable and equal bilaterally   Skin:  No bleeding, bruising or rash   Heme/Lymph:  No cervical or supraclavicular adenopathy.   Neurologic:    Psychiatric:    Moves all extremities with no obvious focal motor deficit.  Cranial nerves 2 - 12 grossly intact  Oriented x 3, normal affect.      Results Review:     I reviewed the patient's new clinical results.   Results from last 7 days   Lab Units  01/07/19   0750 01/06/19   2240 01/06/19   0506 01/05/19   1759 01/03/19   0639   SODIUM mmol/L  133   --   131*  128*   < >  139   POTASSIUM mmol/L  3.7  3.7  3.3*  3.5   < >  3.4*   CHLORIDE mmol/L  104   --   100  100   < >  107   CO2 mmol/L  21.0   --   22.0  16.0*   < >  24.0   BUN mg/dL  33*   --   36*  35*   < >  25*   CREATININE mg/dL  0.93   --   1.14  1.22   < >  0.95   CALCIUM mg/dL  7.7*   --   8.0*  8.2*   < >  8.0*   BILIRUBIN mg/dL  0.8   --   1.0   --    --   0.7   ALK PHOS U/L  117*   --   84   --    --   59   ALT (SGPT) U/L  24   --   11   --    --   21   AST (SGOT) U/L  41*   --   18   --    --   15   GLUCOSE mg/dL  202*   --   179*  235*   < >  207*    < > = values in this interval not displayed.     Results from last 7 days   Lab Units  01/07/19   0750 01/06/19   0506 01/05/19   1759   WBC 10*3/mm3  23.91*  18.36*  22.42*   HEMOGLOBIN g/dL  8.7*  9.0*  9.8*   HEMATOCRIT %  25.8*  27.1*  29.8*   PLATELETS 10*3/mm3  297  279   336   MONOCYTES % %   --   5.0   --      Results from last 7 days   Lab Units  01/06/19   0350   PH, ARTERIAL pH units  7.525*   PO2 ART mm Hg  61.6*   PCO2, ARTERIAL mm Hg  24.9   HCO3 ART mmol/L  20.6     Results from last 7 days   Lab Units  01/07/19   0750  01/06/19   0506  01/05/19   0132   01/03/19   0639   MAGNESIUM mg/dL  1.9  1.9  2.1  2.3   < >  1.9   PHOSPHORUS mg/dL  2.4  2.4  3.4   --    --   2.3*    < > = values in this interval not displayed.   procalcitonin 1/6: 6.71 -> 1/7: 3.52.   Results from last 7 days   Lab Units  01/01/19   0542   HEMOGLOBIN A1C %  6.10*       I reviewed the patient's new imaging including images and reports.  CXR 1/6/18:  IMPRESSION:  Low lung volumes with increased markings at the lung bases  Bilaterally.    CT Abd/pelvis 1/5/19:  IMPRESSION:  There are small bilateral pleural effusions. There is a  fluid collection seen surrounding the liver and spleen containing air in  the fluid collection around the liver. There is air seen scattered  throughout the abdomen which may be postoperative. There is air  identified within the mesenteric vessels with findings suggesting  pneumatosis throughout scattered small bowel loops within the mid  abdomen. Fluid throughout the colon suggesting clinical presentation of  diarrhea. Gastrostomy tube is in satisfactory position.      Medication Review:     budesonide 0.5 mg Nebulization BID - RT   enoxaparin 1 mg/kg Subcutaneous Q12H   Fat Emulsion Fish Oil Based 200 mL Intravenous Q24H   flecainide 50 mg Oral Q12H   insulin regular 0-9 Units Subcutaneous Q6H   insulin regular 2 Units Subcutaneous Q6H   ipratropium-albuterol 3 mL Nebulization 4x Daily - RT   Linezolid 600 mg Intravenous Q12H   meropenem 1 g Intravenous Q8H   micafungin (MYCAMINE)  mg Intravenous Q24H   pantoprazole 40 mg Intravenous Q AM   Scopolamine 1 patch Transdermal Q72H   sodium chloride 10 mL Intravenous Q12H       Adult Central 2-in-1 TPN     Adult Cyclic  2-in-1 TPN  Last Rate: 75 mL/hr at 01/07/19 1000   Pharmacy to Dose TPN         Assessment/Plan       Malignant neoplasm of head of pancreas (CMS/HCC)    Hyperlipidemia    COPD (chronic obstructive pulmonary disease) (CMS/HCC)    Essential hypertension    Gastroesophageal reflux disease without esophagitis    DM2 (diabetes mellitus, type 2) (CMS/HCC)    Paroxysmal atrial fibrillation    Chronic anticoagulation, Eliquis    Post-operative confusion and somnolence, likely due to oversedation    Atrial fibrillation with RVR (CMS/HCC)    Adult necrotizing enterocolitis (CMS/HCC)    Encephalopathy    69 YO with h/o DMII, CKD3, GERD, PAF on Eliquis, locally advanced pancreatic cancer with zulay-adjuvant chemotherapy admitted post Pancreaticoduodenectomy, wedge biopsy of liver, portal vein resection and lateral repair and GJ tube by Dr. Brody on 12/31/18.      Moved to ICU on 1/5/18 due to concerns of breathing difficulty, encephalopathy and findings of pneumatosis, portal venous gas and ileus on CT abdomen/pelvis and concern for sepsis.      Plan:  1. Eliquis for anticoagulation for atrial fibrilation (okay for anticoagulation per Dr. Brody).   2. Antibiotics per ID.   3. Add scheduled regular insulin.   4. Continue TPN.   5. Restart oral flecainide (Okay per Dr. Brody).   6. Follow up cultures.   7. Judicious use of narcotics for pain management.      Patient remains critically ill.       Gerardo Messina MD  01/07/19  4:42 PM    Critical care time : 45 minutes.(This excludes time spent performing separately reportable procedures and services). including high complexity decision making to assess, manipulate, and support vital organ system failure in this individual who has impairment of one or more vital organ systems such that there is a high probability of imminent or life threatening deterioration in the patient’s condition.    *. Please note that portions of this note were completed with a voice recognition  "program. Efforts were made to edit the dictations, but occasionally words are mistranscribed.    Electronically signed by Gerardo Messina MD at 1/7/2019  4:42 PM     Nisha Ackerman MD at 1/7/2019  2:27 PM     Attestation signed by Miquel Brody MD at 1/7/2019  2:33 PM    I have seen the patient, repeated the pertinent aspects of exam, reviewed the documentation above and agree.  Currently he appears much more stable than yesterday although he still has complaints of pain which is anticipated.  I discussed his pathology with his wife.  Would continue TPN at this time.  I think it is okay for him to have ice chips and sips of liquids but still would not use his jejunostomy tube any time in the near future.  Continue to watch him closely for suggestion of clinical demise.  He remains at high risk                    Patient Name:  Todd Phillips  YOB: 1950  [id number]    Surgery Progress Note    Date of visit: 1/7/2019    Subjective   Subjective: improved somewhat today, more alert per wife.  Up in chair, responds appropriately to questions.  Reports abdominal pain unchanged from this AM, denies nausea.          Objective     Objective:     /96   Pulse 100   Temp 99.5 °F (37.5 °C) (Bladder)   Resp 22   Ht 182.9 cm (72\")   Wt 91.5 kg (201 lb 12.8 oz)   SpO2 97%   BMI 27.37 kg/m²      Intake/Output Summary (Last 24 hours) at 1/7/2019 1427  Last data filed at 1/7/2019 1200  Gross per 24 hour   Intake 4381.4 ml   Output 1715 ml   Net 2666.4 ml       CV:  Rhythm regular and rate regular  L:  Slightly coarse to auscultation bilaterally  Abd:  Mildly distended, soft, Mild to moderate tenderness throughout, no signs of peritonitis   Ext:  No cyanosis, clubbing, edema    Labs that are back at this time have been reviewed.         Assessment/Plan     Assessment/ Plan:    67 yo man s/p Duncanipple 12/31/18, now with adult necrotizing enterocolitis.  Moved to ICU 1/5/19 and now slowly " improving.     - continue NPO and TPN  - continue broad antibiotics   - mobilize as much as possible, pulmonary toilet   - agree with continued ICU care       Hospital Problem List     * (Principal) Malignant neoplasm of head of pancreas (CMS/[facility])    Overview Signed 9/5/2018  9:58 AM by Olya Vaughn     Added automatically from request for surgery [id number]         Hyperlipidemia (Chronic)        COPD (chronic obstructive pulmonary disease) (CMS/[facility]) (Chronic)        Essential hypertension (Chronic)        Gastroesophageal reflux disease without esophagitis (Chronic)    DM2 (diabetes mellitus, type 2) (CMS/[facility]) (Chronic)        Paroxysmal atrial fibrillation (Chronic)    Chronic anticoagulation, Eliquis (Chronic)    Post-operative confusion and somnolence, likely due to oversedation        Atrial fibrillation with RVR (CMS/[facility])              Nisha Ackerman MD  1/7/2019  2:27 PM      Electronically signed by Miquel Brody MD at 1/7/2019  2:33 PM       Consult Notes (last 24 hours) (Notes from 1/7/2019  9:52 AM through 1/8/2019  9:52 AM)     No notes of this type exist for this encounter.           Nutrition Notes (last 24 hours) (Notes from 1/7/2019  9:52 AM through 1/8/2019  9:52 AM)      Aster Coleman MS,RD,LD at 1/7/2019  6:23 PM          Adult Nutrition  Assessment/PES    Patient Name:  Todd Phillips  YOB: 1950  MRN: [id number]  Admit Date:  12/31/2018    Assessment Date:  1/7/2019    Comments:  PN support advancing to goal rate. RD will monitor adequacy of delivery for PN  and transition to enteral feeding when appropriate.    Reason for Assessment     Row Name 01/07/19 1810          Reason for Assessment    Reason For Assessment  TF/PN;follow-up protocol;per organizational policy MDR; PN f/u 30 mins     Diagnosis  -- pancreatic cancer, s/p Whipple procedure 12/31, wedge bx of liver tumor, portal vein resection/repair and G/J tube placed. ; SOA, ileus, pneumatosis, portal vein gas      Identified At Risk by Screening Criteria  tube feeding or parenteral nutrition         Nutrition/Diet History     Row Name 01/07/19 1811          Nutrition/Diet History    Typical Food/Fluid Intake  RN reports pt transfered to ICU w/ SOA,ileu, tachycardia. CT of abd done; Pt on PN via L PICC     Factors Affecting Nutritional Intake  altered gastrointestinal function           Labs/Tests/Procedures/Meds     Row Name 01/07/19 1815          Labs/Procedures/Meds    Lab Results Reviewed  reviewed, pertinent        Diagnostic Tests/Procedures    Diagnostic Test/Procedure Reviewed  reviewed, pertinent        Medications    Pertinent Medications Reviewed  reviewed, pertinent     Pertinent Medications Comments  insulin adjusted; PN advancing to goal         Physical Findings     Row Name 01/07/19 1817          Physical Findings    Gastrointestinal  feeding tube     Tubes  other (see comments) PEG/J           Nutrition Prescription Ordered     Row Name 01/07/19 1818          Nutrition Prescription PO    Current PO Diet  NPO        Nutrition Prescription PN    PN Route  PICC     PN Goal Rate (mL/hr)  75 mL/hr     Dextrose Concentration (%)  20 %     Amino Acid Concentration (%)  7 %     Lipid Concentration (%)  20%     Lipid Volume (mL)  200 mL     Lipid Frequency  Daily        Propofol Considerations    Propofol (mL/hr)  0 mL/hr         Evaluation of Received Nutrient/Fluid Intake     Row Name 01/07/19 1818          PN Evaluation    Number of Days PN Evaluated  1 day     PN Average delivery (mL/day)   383 mL/day     PN Average lipid delivery (mL/day)   193 mL/day               Problem/Interventions:  Problem 1     Row Name 01/07/19 1820          Nutrition Diagnoses Problem 1    Problem 1  Altered GI Function     Etiology (related to)  Medical Diagnosis     Gastrointestinal  Other (comment);Ileus s/p whipple, liver biopsy and portal vein resection/repair; PEG/J placement (12/31)     Signs/Symptoms (evidenced by)  PO diet  not tolerated;NPO                 Intervention Goal     Row Name 01/07/19 1822          Intervention Goal    General  Nutrition support treatment;Meet nutritional needs for age/condition;Reduce/improve symptoms     TF/PN  Adjust TF/PN         Nutrition Intervention     Row Name 01/07/19 1822          Nutrition Intervention    RD/Tech Action  Follow Tx progress;Care plan reviewd;Recommend/ordered         Nutrition Prescription     Row Name 01/07/19 1822          Nutrition Prescription PN    Parenteral Prescription  PN begin/change     PN Route  PICC     Dextrose Concentration (%)  20 %     Amino Acid Concentration (%)  7.0%     PN Goal Rate (mL/hr)  75 mL/hr     PN Goal Volume (mL)  1800 mL     Lipid Concentration (%)  20% SMOF lipids     Lipid Volume (mL)  200 mL     Lipid Frequency  Daily     New PN Prescription Ordered?  Yes         Education/Evaluation     Row Name 01/07/19 1823          Monitor/Evaluation    Monitor  Per protocol;I&O;Symptoms;Pertinent labs;PN delivery/tolerance           Electronically signed by:  Aster Coleman MS,RD,LD  01/07/19 6:23 PM    Electronically signed by Aster Coleman MS,RD,LD at 1/7/2019  6:25 PM          Physical Therapy Notes (last 24 hours) (Notes from 1/7/2019  9:52 AM through 1/8/2019  9:52 AM)      Yakelin Alberts, PT at 1/8/2019  9:26 AM  Version 1 of 1         Problem: Patient Care Overview  Goal: Plan of Care Review  Outcome: Ongoing (interventions implemented as appropriate)   01/08/19 0818   OTHER   Outcome Summary PT re-eval completed. Pt continues to demonstrate generalized weakness and decreased indep re: functional mobility, warranting further skilled PT services to promote PLOF. Limited today by lethargy; goals revised to reflect current functional status. Able to perform stand-pivot t/f to recliner with max x2 A. Recommend SNF at d/c.    Coping/Psychosocial   Plan of Care Reviewed With patient;spouse           Electronically signed by Yakelin Alberts, PT at  2019  9:26 AM     Yakelin Alberts, PT at 2019  9:28 AM  Version 1 of 1         Acute Care - Physical Therapy Re-Evaluation   [place]     Patient Name: Todd Phillips  : 1950  MRN: [id number]  Today's Date: 2019   Onset of Illness/Injury or Date of Surgery: 18  Date of Referral to PT: 19  Referring Physician: MD Mayuri      Admit Date: 2018    Visit Dx:     ICD-10-CM ICD-9-CM   1. Impaired functional mobility, balance, gait, and endurance Z74.09 V49.89   2. Laboratory test Z01.89 V72.60   3. Pancreatic cancer (CMS/HCC) C25.9 157.9   4. 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)   • DM2 (diabetes mellitus, type 2) (CMS/HCC)   • Paroxysmal atrial fibrillation   • Chronic anticoagulation, Eliquis   • Encounter for monitoring flecainide therapy   • Malignant neoplasm of head of pancreas (CMS/HCC)   • Bacteremia due to Escherichia coli   • Biliary obstruction   • Thrombocytopenia (CMS/HCC)   • Anemia due to chemotherapy   • Fever   • Post-operative confusion and somnolence, likely due to oversedation   • Atrial fibrillation with RVR (CMS/HCC)   • Adult necrotizing enterocolitis (CMS/HCC)   • Encephalopathy     Past Medical History:   Diagnosis Date   • Antral gastritis    • Asthma    • Atrial fibrillation (CMS/HCC)     treated with oral blood thinner   • Chest pain    • COPD (chronic obstructive pulmonary disease) (CMS/HCC)    • Coronary artery disease     no stents   • Diabetes mellitus (CMS/HCC)     type 2 - checks sugar 4 times per day    • Duodenitis    • Elevated cholesterol    • Emphysema of lung (CMS/HCC)     • Gallbladder disease     removed    • GERD (gastroesophageal reflux disease)    • Hard to intubate     busted lip last time   • Hyperlipidemia    • Hypertension    • Jaundice    • Kidney stone    • Kidney stones     had lithotripsy and passed 36 kidney stones as well as had one surgically removed.   • Malignant neoplasm of head of pancreas (CMS/HCC) 9/5/2018   • Nausea    • Obstructive sleep apnea on CPAP     compliant with machine    • Palpitations    • Pneumonia 08/2018   • Reflux esophagitis    • Renal failure     stage 3 kidney disease   • Sepsis (CMS/HCC)      Past Surgical History:   Procedure Laterality Date   • BILE DUCT STENT PLACEMENT      [facility] 2 weeks ago    • CARDIAC CATHETERIZATION  11/01/1999   • CARDIOVASCULAR STRESS TEST  09/2012   • CHOLECYSTECTOMY N/A 8/10/2018    Procedure: CHOLECYSTECTOMY LAPAROSCOPIC;  Surgeon: Ronak Downs MD;  Location:  [facility];  Service: General   • COLONOSCOPY     • CYSTOSCOPY BLADDER STONE LITHOTRIPSY     • ECHO - CONVERTED  09/2012   • ERCP N/A 8/14/2018    Procedure: ENDOSCOPIC RETROGRADE CHOLANGIOPANCREATOGRAPHY WITH PAPILLOTOMY;  Surgeon: Scot Su MD;  Location:  [facility];  Service: Gastroenterology   • ERCP N/A 10/1/2018    Procedure: ENDOSCOPIC RETROGRADE CHOLANGIOPANCREATOGRAPHY;  Surgeon: Jefry Luna MD;  Location:  [facility];  Service: Gastroenterology   • KIDNEY STONE SURGERY     • KIDNEY STONE SURGERY      open abdominal surgery   • PORTACATH PLACEMENT Right 10/23/2018    Procedure: INSERTION OF PORTACATH;  Surgeon: Ronak Downs MD;  Location:  [facility];  Service: General   • TONSILLECTOMY     • UPPER GASTROINTESTINAL ENDOSCOPY  08/30/2012   • WHIPPLE PROCEDURE N/A 12/31/2018    Procedure: WHIPPLE PROCEDURE, BIOPSY OF LIVER, PORTAL VEIN RESECTION AND REPAIR, G/J TUBE PLACEMENT;  Surgeon: Miquel Brody MD;  Location:  [facility];  Service: General        PT ASSESSMENT (last 12 hours)       Physical Therapy Evaluation     Row Name 01/08/19 0818          PT Evaluation Time/Intention    Subjective Information  no complaints  -LS     Document Type  re-evaluation  -LS     Mode of Treatment  physical therapy  -LS     Patient Effort  good  -LS     Symptoms Noted During/After Treatment  fatigue  -LS     Row Name 01/08/19 0818          General Information    Patient Profile Reviewed?  yes  -LS     Onset of Illness/Injury or Date of Surgery  12/31/18  -LS     Referring Physician  MD Mayuri  -LS     Patient Observations  alert;cooperative;agree to therapy  -LS     Prior Level of Function  -- see IE  -LS     Pertinent History of Current Functional Problem  Please see IE for original admit; pt s/p transfer to ICU on 1/5 with worsening resp status. RESUME PT orders received.   -LS     Existing Precautions/Restrictions  fall;oxygen therapy device and L/min G/j tube; abd incision; colostomy  -LS     Limitations/Impairments  safety/cognitive  -LS     Risks Reviewed  patient:;spouse/S.O.:;LOB;dizziness;increased discomfort;change in vital signs  -LS     Benefits Reviewed  patient:;spouse/S.O.:;improve function;increase independence;increase strength;increase knowledge  -LS     Barriers to Rehab  medically complex  -LS     Row Name 01/08/19 0818          Relationship/Environment    Family Caregiver if Needed  -- see IE  -LS     Row Name 01/08/19 0818          Resource/Environmental Concerns    Current Living Arrangements  home/apartment/condo  -LS     Row Name 01/08/19 0818          Cognitive Assessment/Intervention- PT/OT    Affect/Mental Status (Cognitive)  low arousal/lethargic;flat/blunted affect  -LS     Orientation Status (Cognition)  oriented to;person  -LS     Follows Commands (Cognition)  follows one step commands;25-49% accuracy  -LS     Cognitive Function (Cognitive)  safety deficit  -LS     Safety Deficit (Cognitive)  moderate deficit;awareness of need for assistance;judgment;safety precautions  awareness  -     Personal Safety Interventions  fall prevention program maintained;gait belt;nonskid shoes/slippers when out of bed  -[facility] Name 01/08/19 0818          Safety Issues, Functional Mobility    Impairments Affecting Function (Mobility)  balance;cognition;coordination;endurance/activity tolerance;strength;shortness of breath  -[facility] Name 01/08/19 0818          Bed Mobility Assessment/Treatment    Supine-Sit [place] (Bed Mobility)  maximum assist (25% patient effort);2 person assist;verbal cues  -     Assistive Device (Bed Mobility)  bed rails;draw sheet;head of bed elevated  -[facility] Name 01/08/19 0818          Transfer Assessment/Treatment    Transfer Assessment/Treatment  sit-stand transfer;stand-sit transfer;bed-chair transfer  -     Comment (Transfers)  PT/tech on either side of pt.  -     Bed-Chair [place] (Transfers)  maximum assist (25% patient effort);2 person assist;verbal cues  -     Assistive Device (Bed-Chair Transfers)  -- BUE support via PT/tech and gait belt use  -     Sit-Stand [place] (Transfers)  moderate assist (50% patient effort);2 person assist;verbal cues  -     Stand-Sit [place] (Transfers)  moderate assist (50% patient effort);2 person assist;verbal cues  -[facility] Name 01/08/19 0818          Gait/Stairs Assessment/Training    [place] Level (Gait)  unable to assess  -[facility] Name 01/08/19 0818          General ROM    GENERAL ROM COMMENTS  BLE WFL (AAROM)  -[facility] Name 01/08/19 0818          MMT (Manual Muscle Testing)    General MMT Comments  pt unable to follow formal commands for MMT; grossly 3-/5 as demonstrated functionally  -[facility] Name 01/08/19 0818          Motor Assessment/Intervention    Additional Documentation  Balance (Group);Therapeutic Exercise (Group)  -[facility] Name 01/08/19 0818          Therapeutic Exercise    65691 - PT Therapeutic Activity Minutes  15  -[facility] Name 01/08/19 0818           Static Sitting Balance    Level of [place] (Unsupported Sitting, Static Balance)  moderate assist, 50 to 74% patient effort  -LS     Sitting Position (Unsupported Sitting, Static Balance)  sitting on edge of bed  -LS     Time Able to Maintain Position (Unsupported Sitting, Static Balance)  2 to 3 minutes  -LS     Comment (Unsupported Sitting, Static Balance)  progressed to min A  -LS     Row Name 01/08/19 0818          Static Standing Balance    Level of [place] (Supported Standing, Static Balance)  moderate assist, 50 to 74% patient effort  -LS     Comment (Supported Standing, Static Balance)  BUE support  -     Row Name 01/08/19 0818          Sensory Assessment/Intervention    Sensory General Assessment  no sensation deficits identified BLE  -     Row Name 01/08/19 0818          Pain Assessment    Additional Documentation  Pain Scale: FACES Pre/Post-Treatment (Group)  -     Row Name 01/08/19 0818          Pain Scale: Numbers Pre/Post-Treatment    Pain Location - Orientation  incisional  -LS     Pain Location  abdomen  -LS     Pain Intervention(s)  Repositioned;Ambulation/increased activity  -     Row Name 01/08/19 0818          Pain Scale: FACES Pre/Post-Treatment    Pain: FACES Scale, Pretreatment  0-->no hurt  -LS     Pain: FACES Scale, Post-Treatment  2-->hurts little bit  -LS     Pre/Post Treatment Pain Comment  tolerated  -     Row Name 01/08/19 0818          Plan of Care Review    Plan of Care Reviewed With  patient;spouse  -     Row Name 01/08/19 0818          Physical Therapy Clinical Impression    Date of Referral to PT  01/07/19  -     PT Diagnosis (PT Clinical Impression)  impaired functional mobility, balance, gait  -LS     Patient/Family Goals Statement (PT Clinical Impression)  return to PLOF  -     Criteria for Skilled Interventions Met (PT Clinical Impression)  yes;treatment indicated  -LS     Rehab Potential (PT Clinical Summary)  good, to achieve stated therapy goals   -LS     Care Plan Review (PT)  evaluation/treatment results reviewed  -LS     Care Plan Review, Other Participant (PT Clinical Impression)  spouse  -     Row Name 01/08/19 0818          Vital Signs    Pre Systolic BP Rehab  152  -LS     Pre Treatment Diastolic BP  83  -LS     Posttreatment Heart Rate (beats/min)  98  -LS     Pre SpO2 (%)  93  -LS     O2 Delivery Pre Treatment  supplemental O2  -LS     Post SpO2 (%)  92  -LS     O2 Delivery Post Treatment  supplemental O2  -LS     Pre Patient Position  Supine  -LS     Intra Patient Position  Standing  -LS     Post Patient Position  Sitting  -LS     Row Name 01/08/19 0818          Bed Mobility Goal 1 (PT)    Activity/Assistive Device (Bed Mobility Goal 1, PT)  sit to supine/supine to sit  -LS     [place] Level/Cues Needed (Bed Mobility Goal 1, PT)  minimum assist (75% or more patient effort)  -LS     Time Frame (Bed Mobility Goal 1, PT)  2 weeks  -LS     Progress/Outcomes (Bed Mobility Goal 1, PT)  goal ongoing  -     Row Name 01/08/19 0818          Transfer Goal 1 (PT)    Activity/Assistive Device (Transfer Goal 1, PT)  sit-to-stand/stand-to-sit;walker, rolling  -LS     [place] Level/Cues Needed (Transfer Goal 1, PT)  minimum assist (75% or more patient effort)  -LS     Time Frame (Transfer Goal 1, PT)  2 weeks  -LS     Progress/Outcome (Transfer Goal 1, PT)  goal ongoing  -     Row Name 01/08/19 0818          Gait Training Goal 1 (PT)    Activity/Assistive Device (Gait Training Goal 1, PT)  gait (walking locomotion);assistive device use;walker, rolling  -LS     [place] Level (Gait Training Goal 1, PT)  minimum assist (75% or more patient effort)  -LS     Distance (Gait Goal 1, PT)  100  -LS     Time Frame (Gait Training Goal 1, PT)  2 weeks  -LS     Progress/Outcome (Gait Training Goal 1, PT)  goal revised this date;goal ongoing  -     Row Name 01/08/19 0818          Positioning and Restraints    Pre-Treatment Position  in bed  -LS     Post  Treatment Position  chair  -LS     In Chair  notified nsg;reclined;call light within reach;encouraged to call for assist;with family/caregiver;RUE elevated;LUE elevated;waffle cushion;on mechanical lift sling;legs elevated;heels elevated  -       User Key  (r) = Recorded By, (t) = Taken By, (c) = Cosigned By    Initials Name Provider Type    Yakelin Pop, PT Physical Therapist        Physical Therapy Education     Title: PT OT SLP Therapies (In Progress)     Topic: Physical Therapy (In Progress)     Point: Mobility training (In Progress)     Learning Progress Summary           Patient Acceptance, E,D, VU,NR by SUMAN at 1/8/2019  8:18 AM    Acceptance, E, NR by VG at 1/4/2019  3:04 PM    Comment:  Educated on HEP and correct mechanics for safe functional mobility. Reinforcement needed d/t current cognitive status.    Acceptance, E, NR by VG at 1/3/2019  9:35 AM    Comment:  Educated on HEP and correct mechanics for safe functional mobility. Reinforcement needed d/t impaired safety awareness.    Acceptance, E,D, NR by ILEANA at 1/1/2019  2:22 PM   Family Acceptance, E, NR by VG at 1/4/2019  3:04 PM    Comment:  Educated on HEP and correct mechanics for safe functional mobility. Reinforcement needed d/t current cognitive status.    Acceptance, E, NR by VG at 1/3/2019  9:35 AM    Comment:  Educated on HEP and correct mechanics for safe functional mobility. Reinforcement needed d/t impaired safety awareness.   Significant Other Acceptance, E,D, VU,NR by SUMAN at 1/8/2019  8:18 AM    Acceptance, E,D, NR by ILEANA at 1/1/2019  2:22 PM                   Point: Home exercise program (In Progress)     Learning Progress Summary           Patient Acceptance, E,D, VU,NR by SUMAN at 1/8/2019  8:18 AM    Acceptance, E, NR by VG at 1/4/2019  3:04 PM    Comment:  Educated on HEP and correct mechanics for safe functional mobility. Reinforcement needed d/t current cognitive status.    Acceptance, E, NR by VG at 1/3/2019  9:35 AM    Comment:   Educated on HEP and correct mechanics for safe functional mobility. Reinforcement needed d/t impaired safety awareness.    Acceptance, E,D, NR by SJ at 1/1/2019  2:22 PM   Family Acceptance, E, NR by VG at 1/4/2019  3:04 PM    Comment:  Educated on HEP and correct mechanics for safe functional mobility. Reinforcement needed d/t current cognitive status.    Acceptance, E, NR by VG at 1/3/2019  9:35 AM    Comment:  Educated on HEP and correct mechanics for safe functional mobility. Reinforcement needed d/t impaired safety awareness.   Significant Other Acceptance, E,D, VU,NR by LS at 1/8/2019  8:18 AM    Acceptance, E,D, NR by SJ at 1/1/2019  2:22 PM                   Point: Body mechanics (In Progress)     Learning Progress Summary           Patient Acceptance, E,D, VU,NR by LS at 1/8/2019  8:18 AM    Acceptance, E, NR by VG at 1/4/2019  3:04 PM    Comment:  Educated on HEP and correct mechanics for safe functional mobility. Reinforcement needed d/t current cognitive status.    Acceptance, E, NR by VG at 1/3/2019  9:35 AM    Comment:  Educated on HEP and correct mechanics for safe functional mobility. Reinforcement needed d/t impaired safety awareness.    Acceptance, E,D, NR by SJ at 1/1/2019  2:22 PM   Family Acceptance, E, NR by VG at 1/4/2019  3:04 PM    Comment:  Educated on HEP and correct mechanics for safe functional mobility. Reinforcement needed d/t current cognitive status.    Acceptance, E, NR by VG at 1/3/2019  9:35 AM    Comment:  Educated on HEP and correct mechanics for safe functional mobility. Reinforcement needed d/t impaired safety awareness.   Significant Other Acceptance, E,D, VU,NR by LS at 1/8/2019  8:18 AM    Acceptance, E,D, NR by SJ at 1/1/2019  2:22 PM                   Point: Precautions (In Progress)     Learning Progress Summary           Patient Acceptance, E,D, VU,NR by LS at 1/8/2019  8:18 AM    Acceptance, E, NR by VG at 1/4/2019  3:04 PM    Comment:  Educated on HEP and correct  mechanics for safe functional mobility. Reinforcement needed d/t current cognitive status.    Acceptance, E, NR by VG at 1/3/2019  9:35 AM    Comment:  Educated on HEP and correct mechanics for safe functional mobility. Reinforcement needed d/t impaired safety awareness.    Acceptance, E,D, NR by SJ at 1/1/2019  2:22 PM   Family Acceptance, E, NR by VG at 1/4/2019  3:04 PM    Comment:  Educated on HEP and correct mechanics for safe functional mobility. Reinforcement needed d/t current cognitive status.    Acceptance, E, NR by VG at 1/3/2019  9:35 AM    Comment:  Educated on HEP and correct mechanics for safe functional mobility. Reinforcement needed d/t impaired safety awareness.   Significant Other Acceptance, E,D, VU,NR by SUMAN at 1/8/2019  8:18 AM    Acceptance, E,D, NR by ILEANA at 1/1/2019  2:22 PM                               User Key     Initials Effective Dates Name Provider Type Discipline     06/19/15 -  Reta Iqbal, PT Physical Therapist PT     06/19/15 -  Yakelin Alberts, PT Physical Therapist PT    [facility] 05/29/18 -  Maribeth Lynn, PT Physical Therapist PT              PT Recommendation and Plan  Anticipated Discharge Disposition (PT): skilled nursing facility  Planned Therapy Interventions (PT Eval): balance training, bed mobility training, gait training, home exercise program, patient/family education, strengthening, transfer training  Therapy Frequency (PT Clinical Impression): daily  Outcome Summary/Treatment Plan (PT)  Anticipated Discharge Disposition (PT): skilled nursing facility  Plan of Care Reviewed With: patient, spouse  Outcome Summary: PT re-eval completed. Pt continues to demonstrate generalized weakness and decreased indep re: functional mobility, warranting further skilled PT services to promote PLOF. Limited today by lethargy; goals revised to reflect current functional status. Able to perform stand-pivot t/f to recliner with max x2 A. Recommend SNF at d/c.   Outcome Measures     Row  Name 01/08/19 0818 01/07/19 1339          How much help from another person do you currently need...    Turning from your back to your side while in flat bed without using bedrails?  2  -LS  --     Moving from lying on back to sitting on the side of a flat bed without bedrails?  2  -LS  --     Moving to and from a bed to a chair (including a wheelchair)?  2  -LS  --     Standing up from a chair using your arms (e.g., wheelchair, bedside chair)?  2  -LS  --     Climbing 3-5 steps with a railing?  1  -LS  --     To walk in hospital room?  1  -LS  --     AM-PAC 6 Clicks Score  10  -LS  --        How much help from another is currently needed...    Putting on and taking off regular lower body clothing?  --  1  -CL     Bathing (including washing, rinsing, and drying)  --  1  -CL     Toileting (which includes using toilet bed pan or urinal)  --  1  -CL     Putting on and taking off regular upper body clothing  --  2  -CL     Taking care of personal grooming (such as brushing teeth)  --  2  -CL     Eating meals  --  1  -CL     Score  --  8  -CL        Functional Assessment    Outcome Measure Options  AM-PAC 6 Clicks Basic Mobility (PT)  -LS  AM-PAC 6 Clicks Daily Activity (OT)  -CL       User Key  (r) = Recorded By, (t) = Taken By, (c) = Cosigned By    Initials Name Provider Type    Yakelin Pop, PT Physical Therapist    CL Martha Blackwood, OT Occupational Therapist         Time Calculation:   PT Charges     Row Name 01/08/19 0818             Time Calculation    Start Time  0818  -      PT Received On  01/08/19  -      PT Goal Re-Cert Due Date  01/18/19  -         Time Calculation- PT    Total Timed Code Minutes- PT  15 minute(s)  -         Timed Charges    57607 - PT Therapeutic Activity Minutes  15  -LS        User Key  (r) = Recorded By, (t) = Taken By, (c) = Cosigned By    Initials Name Provider Type    Yakelin Pop, PT Physical Therapist        Therapy Suggested Charges     Code   Minutes Charges     99975 (CPT®) Hc Pt Neuromusc Re Education Ea 15 Min      46090 (CPT®) Hc Pt Ther Proc Ea 15 Min      05197 (CPT®) Hc Gait Training Ea 15 Min      32749 (CPT®) Hc Pt Therapeutic Act Ea 15 Min 15 1    60292 (CPT®) Hc Pt Manual Therapy Ea 15 Min      75658 (CPT®) Hc Pt Iontophoresis Ea 15 Min      20257 (CPT®) Hc Pt Elec Stim Ea-Per 15 Min      24836 (CPT®) Hc Pt Ultrasound Ea 15 Min      06814 (CPT®) Hc Pt Self Care/Mgmt/Train Ea 15 Min      78436 (CPT®) Hc Pt Prosthetic (S) Train Initial Encounter, Each 15 Min      95240 (CPT®) Hc Pt Orthotic(S)/Prosthetic(S) Encounter, Each 15 Min      86588 (CPT®) Hc Orthotic(S) Mgmt/Train Initial Encounter, Each 15min      Total  15 1        Therapy Charges for Today     Code Description Service Date Service Provider Modifiers Qty    83255262210 HC PT RE-EVAL ESTABLISHED PLAN 2 2019 Yakelin Alberts, PT GP 1    07939334846 HC PT THERAPEUTIC ACT EA 15 MIN 2019 Yakelin Alberts, PT GP 1    26184350325 HC PT THER SUPP EA 15 MIN 2019 Yakelin Alberts, PT GP 2          PT G-Codes  Outcome Measure Options: AM-PAC 6 Clicks Basic Mobility (PT)  AM-PAC 6 Clicks Score: 10  Score: 8      Yakelin Alberts, PT  2019         Electronically signed by Yakelin Alberts, PT at 2019  9:28 AM          Occupational Therapy Notes (last 24 hours) (Notes from 2019  9:52 AM through 2019  9:52 AM)      Martha Blackwood, OT at 2019  4:00 PM          Acute Care - Occupational Therapy Re-Evaluation  [facility]     Patient Name: Todd Phillips  : 1950  MRN: [id number]  Today's Date: 2019  Onset of Illness/Injury or Date of Surgery: 19  Date of Referral to OT: 19  Referring Physician: MD Mayuri    Admit Date: 2018       ICD-10-CM ICD-9-CM   1. Impaired functional mobility, balance, gait, and endurance Z74.09 V49.89   2. Laboratory test Z01.89 V72.60   3. Pancreatic cancer (CMS/HCC) C25.9 157.9   4. Impaired mobility and ADLs Z74.09 799.89     Patient Active  Problem List   Diagnosis   • Hyperlipidemia   • COPD (chronic obstructive pulmonary disease) (CMS/HCC)   • Essential hypertension   • Gout without tophus   • Anxiety and depression   • Primary insomnia   • Gastroesophageal reflux disease without esophagitis   • Nonobstructive atherosclerosis of coronary artery   • CKD stage 3 secondary to diabetes (CMS/HCC)   • DM2 (diabetes mellitus, type 2) (CMS/HCC)   • Paroxysmal atrial fibrillation   • Chronic anticoagulation, Eliquis   • Encounter for monitoring flecainide therapy   • Malignant neoplasm of head of pancreas (CMS/HCC)   • Bacteremia due to Escherichia coli   • Biliary obstruction   • Thrombocytopenia (CMS/HCC)   • Anemia due to chemotherapy   • Fever   • Post-operative confusion and somnolence, likely due to oversedation   • Atrial fibrillation with RVR (CMS/HCC)     Past Medical History:   Diagnosis Date   • Antral gastritis    • Asthma    • Atrial fibrillation (CMS/HCC)     treated with oral blood thinner   • Chest pain    • COPD (chronic obstructive pulmonary disease) (CMS/HCC)    • Coronary artery disease     no stents   • Diabetes mellitus (CMS/HCC)     type 2 - checks sugar 4 times per day    • Duodenitis    • Elevated cholesterol    • Emphysema of lung (CMS/HCC)    • Gallbladder disease     removed    • GERD (gastroesophageal reflux disease)    • Hard to intubate     busted lip last time   • Hyperlipidemia    • Hypertension    • Jaundice    • Kidney stone    • Kidney stones     had lithotripsy and passed 36 kidney stones as well as had one surgically removed.   • Malignant neoplasm of head of pancreas (CMS/HCC) 9/5/2018   • Nausea    • Obstructive sleep apnea on CPAP     compliant with machine    • Palpitations    • Pneumonia 08/2018   • Reflux esophagitis    • Renal failure     stage 3 kidney disease   • Sepsis (CMS/HCC)      Past Surgical History:   Procedure Laterality Date   • BILE DUCT STENT PLACEMENT      [facility] 2 weeks ago    •  CARDIAC CATHETERIZATION  11/01/1999   • CARDIOVASCULAR STRESS TEST  09/2012   • CHOLECYSTECTOMY N/A 8/10/2018    Procedure: CHOLECYSTECTOMY LAPAROSCOPIC;  Surgeon: Ronak Downs MD;  Location:  [facility];  Service: General   • COLONOSCOPY     • CYSTOSCOPY BLADDER STONE LITHOTRIPSY     • ECHO - CONVERTED  09/2012   • ERCP N/A 8/14/2018    Procedure: ENDOSCOPIC RETROGRADE CHOLANGIOPANCREATOGRAPHY WITH PAPILLOTOMY;  Surgeon: Scot Su MD;  Location:  [facility];  Service: Gastroenterology   • ERCP N/A 10/1/2018    Procedure: ENDOSCOPIC RETROGRADE CHOLANGIOPANCREATOGRAPHY;  Surgeon: Jefry Luna MD;  Location:  [facility];  Service: Gastroenterology   • KIDNEY STONE SURGERY     • KIDNEY STONE SURGERY      open abdominal surgery   • PORTACATH PLACEMENT Right 10/23/2018    Procedure: INSERTION OF PORTACATH;  Surgeon: Ronak Downs MD;  Location: [facility] OR;  Service: General   • TONSILLECTOMY     • UPPER GASTROINTESTINAL ENDOSCOPY  08/30/2012   • WHIPPLE PROCEDURE N/A 12/31/2018    Procedure: WHIPPLE PROCEDURE, BIOPSY OF LIVER, PORTAL VEIN RESECTION AND REPAIR, G/J TUBE PLACEMENT;  Surgeon: Miquel Brody MD;  Location:  [facility];  Service: General          OT ASSESSMENT FLOWSHEET (last 72 hours)      Occupational Therapy Evaluation     Row Name 01/07/19 1339                   OT Evaluation Time/Intention    Document Type  re-evaluation  -CL        Symptoms Noted During/After Treatment  fatigue  -CL           General Information    Patient Profile Reviewed?  yes  -CL        Onset of Illness/Injury or Date of Surgery  01/07/19  -CL        Referring Physician  MD Mayuri  -CL        Prior Level of Function  -- Please see IE.  -CL        Equipment Currently Used at Home  -- Please see IE.  -CL        Pertinent History of Current Functional Problem  Please see IE for full history. P t/f to the ICU on 01/05 2/2 to worsening resp status. RESUME IPOT orders received.   -CL         Limitations/Impairments  safety/cognitive  -CL        Risks Reviewed  patient and family:;LOB;nausea/vomiting;dizziness;increased discomfort;change in vital signs;lines disloged  -CL        Benefits Reviewed  patient and family:;improve function;increase independence;increase strength;increase balance;decrease pain;increase knowledge  -CL        Barriers to Rehab  medically complex;cognitive status  -CL           Relationship/Environment    Lives With  spouse Please see IE.  -CL           Resource/Environmental Concerns    Current Living Arrangements  home/apartment/condo  -CL           Cognitive Assessment/Intervention- PT/OT    Affect/Mental Status (Cognitive)  low arousal/lethargic;flat/blunted affect  -CL        Orientation Status (Cognition)  oriented to;person;disoriented to;place;time  -CL        Follows Commands (Cognition)  follows one step commands;25-49% accuracy;verbal cues/prompting required;repetition of directions required;increased processing time needed  -CL        Cognitive Function (Cognitive)  safety deficit  -CL        Safety Deficit (Cognitive)  moderate deficit;awareness of need for assistance;safety precautions awareness  -CL        Personal Safety Interventions  fall prevention program maintained;nonskid shoes/slippers when out of bed;supervised activity  -CL           Bed Mobility Assessment/Treatment    Bed Mobility Assessment/Treatment  rolling right;rolling left  -CL        Rolling Left [place] (Bed Mobility)  maximum assist (25% patient effort);2 person assist;verbal cues  -CL        Rolling Right [place] (Bed Mobility)  maximum assist (25% patient effort);2 person assist;verbal cues  -CL        Assistive Device (Bed Mobility)  bed rails;draw sheet  -CL        Comment (Bed Mobility)  Rolling to place lift sling.   -CL           Functional Mobility    Functional Mobility- Ind. Level  not appropriate to assess  -CL           Transfer Assessment/Treatment    Transfer  Assessment/Treatment  bed-chair transfer  -CL        Comment (Transfers)  Deferred attempts at STS t/f d/t cognitive status and poor trunk control.   -CL           Bed-Chair Transfer    Bed-Chair [place] (Transfers)  dependent (less than 25% patient effort);2 person assist;verbal cues  -CL        Assistive Device (Bed-Chair Transfers)  mechanical lift/aid  -CL           General ROM    GENERAL ROM COMMENTS  Limited formally assessment d/t cognitive status.  -CL           MMT (Manual Muscle Testing)    General MMT Comments  Limited formally assessment d/t cognitive status.  -CL           Therapeutic Exercise    95963 - OT Therapeutic Activity Minutes  24  -CL           Upper Extremity Supine Therapeutic Exercise    Performed, Supine Upper Extremity (Therapeutic Exercise)  -- Pt resisting movement  -CL           Static Sitting Balance    Level of [place] (Unsupported Sitting, Static Balance)  dependent  -CL        Sitting Position (Unsupported Sitting, Static Balance)  sitting in chair  -CL        Time Able to Maintain Position (Unsupported Sitting, Static Balance)  15 to 30 seconds  -CL           Sensory Assessment/Intervention    Sensory General Assessment  no sensation deficits identified  -CL           Positioning and Restraints    Pre-Treatment Position  in bed  -CL        Post Treatment Position  chair  -CL        In Chair  notified nsg;reclined;call light within reach;encouraged to call for assist;exit alarm on;with family/caregiver;RUE elevated;LUE elevated;waffle cushion;on mechanical lift sling;legs elevated;heels elevated  -CL           Pain Assessment    Additional Documentation  Pain Scale 2: FACES Pre/Post-Treatment (Group)  -CL           Pain Scale 2: FACES Pre/Post-Treatment    Pain 2: FACES Scale, Pretreatment  2-->hurts little bit  -CL        Pain 2: FACES Scale, Post-Treatment  2-->hurts little bit  -CL        Pain Location 2  abdomen  -CL        Pre/Post Treatment Pain 2 Comment   Tolerated.   -CL        Pain Intervention(s) 2  Repositioned;Ambulation/increased activity  -CL           Clinical Impression (OT)    Date of Referral to OT  01/07/19  -CL        OT Diagnosis  Decreased independence in ADLs.   -CL        Patient/Family Goals Statement (OT Eval)  Return to PLOF.   -CL        Criteria for Skilled Therapeutic Interventions Met (OT Eval)  yes;treatment indicated  -CL        Rehab Potential (OT Eval)  fair, will monitor progress closely  -CL        Therapy Frequency (OT Eval)  daily  -CL        Anticipated Equipment Needs at Discharge (OT)  -- TBA further  -CL        Anticipated Discharge Disposition (OT)  skilled nursing facility  -CL           Vital Signs    Pre Patient Position  Supine  -CL        Intra Patient Position  Sitting  -CL        Post Patient Position  Sitting  -CL           OT Goals    Balance Goal Selection (OT)  balance, OT goal 1  -CL        Additional Documentation  Balance Goal Selection (OT) (Row)  -CL           Transfer Goal 1 (OT)    Activity/Assistive Device (Transfer Goal 1, OT)  sit-to-stand/stand-to-sit;toilet AAD  -CL        [place] Level/Cues Needed (Transfer Goal 1, OT)  maximum assist (25-49% patient effort);verbal cues required  -CL        Time Frame (Transfer Goal 1, OT)  by discharge;long term goal (LTG)  -CL        Progress/Outcome (Transfer Goal 1, OT)  goal revised this date  -CL           Dressing Goal 1 (OT)    Progress/Outcome (Dressing Goal 1, OT)  goal ongoing  -CL           Strength Goal 1 (OT)    Progress/Outcome (Strength Goal 1, OT)  goal ongoing  -CL           Balance Goal 1 (OT)    Activity/Assistive Device (Balance Goal 1, OT)  sitting, static  -CL        [place] Level/Cues Needed (Balance Goal 1, OT)  minimum assist (75% or more patient effort);verbal cues required  -CL        Time Frame (Balance Goal 1, OT)  by discharge;long term goal (LTG)  -CL        Progress/Outcomes (Balance Goal 1, OT)  goal ongoing  -CL            Functional Mobility Goal 1 (OT)    Progress/Outcome (Functional Mobility Goal 1, OT)  goal no longer appropriate  -CL          User Key  (r) = Recorded By, (t) = Taken By, (c) = Cosigned By    Initials Name Effective Dates    CL Martha Blackwood OT 04/03/18 -          Occupational Therapy Education     Title: PT OT SLP Therapies (In Progress)     Topic: Occupational Therapy (In Progress)     Point: ADL training (In Progress)     Description: Instruct learner(s) on proper safety adaptation and remediation techniques during self care or transfers.   Instruct in proper use of assistive devices.    Learning Progress Summary           Patient Acceptance, E, NR by CL at 1/7/2019  3:58 PM    Comment:  Pt educated on appropriate safety precautions, t/f techniques, role of OT, positioning, and benefits of therapy.    Acceptance, E,D,TB, VU,NR by TB at 1/4/2019  3:55 PM    Comment:  Education initiated for benefits of OOB activity/therapy, role of OT, transfer training, HEP and recommendation for rehab at d/c   Family Acceptance, E, NR by CL at 1/7/2019  3:58 PM    Comment:  Pt educated on appropriate safety precautions, t/f techniques, role of OT, positioning, and benefits of therapy.    Acceptance, E,D,TB, VU,NR by TB at 1/4/2019  3:55 PM    Comment:  Education initiated for benefits of OOB activity/therapy, role of OT, transfer training, HEP and recommendation for rehab at d/c                   Point: Home exercise program (Done)     Description: Instruct learner(s) on appropriate technique for monitoring, assisting and/or progressing therapeutic exercises/activities.    Learning Progress Summary           Patient Acceptance, E,D,TB, VU,NR by TB at 1/4/2019  3:55 PM    Comment:  Education initiated for benefits of OOB activity/therapy, role of OT, transfer training, HEP and recommendation for rehab at d/c   Family Acceptance, E,D,TB, VU,NR by TB at 1/4/2019  3:55 PM    Comment:  Education initiated for benefits of OOB  activity/therapy, role of OT, transfer training, HEP and recommendation for rehab at d/c                   Point: Precautions (In Progress)     Description: Instruct learner(s) on prescribed precautions during self-care and functional transfers.    Learning Progress Summary           Patient Acceptance, E, NR by CL at 1/7/2019  3:58 PM    Comment:  Pt educated on appropriate safety precautions, t/f techniques, role of OT, positioning, and benefits of therapy.   Family Acceptance, E, NR by CL at 1/7/2019  3:58 PM    Comment:  Pt educated on appropriate safety precautions, t/f techniques, role of OT, positioning, and benefits of therapy.                   Point: Body mechanics (In Progress)     Description: Instruct learner(s) on proper positioning and spine alignment during self-care, functional mobility activities and/or exercises.    Learning Progress Summary           Patient Acceptance, E, NR by CL at 1/7/2019  3:58 PM    Comment:  Pt educated on appropriate safety precautions, t/f techniques, role of OT, positioning, and benefits of therapy.   Family Acceptance, E, NR by CL at 1/7/2019  3:58 PM    Comment:  Pt educated on appropriate safety precautions, t/f techniques, role of OT, positioning, and benefits of therapy.                               User Key     Initials Effective Dates Name Provider Type Discipline    TB 06/08/18 -  Corina Nelson, OT Occupational Therapist OT    CL 04/03/18 -  Martha Blackwood OT Occupational Therapist OT                  OT Recommendation and Plan  Outcome Summary/Treatment Plan (OT)  Anticipated Equipment Needs at Discharge (OT): (TBA further)  Anticipated Discharge Disposition (OT): skilled nursing facility  Therapy Frequency (OT Eval): daily  Plan of Care Review  Plan of Care Reviewed With: patient, family  Plan of Care Reviewed With: patient, family  Outcome Summary: OT Re-eval complete s/p ICU t/f. Pt session limited d/t lethargy and limited command following. Pt  Depx2 for bed/chair t/f w/ mechanical lift. Recommend cont skilled IPOT POC. Recommend pt DC to SNF.     Outcome Measures     Row Name 01/07/19 1339             How much help from another is currently needed...    Putting on and taking off regular lower body clothing?  1  -CL      Bathing (including washing, rinsing, and drying)  1  -CL      Toileting (which includes using toilet bed pan or urinal)  1  -CL      Putting on and taking off regular upper body clothing  2  -CL      Taking care of personal grooming (such as brushing teeth)  2  -CL      Eating meals  1  -CL      Score  8  -CL         Functional Assessment    Outcome Measure Options  AM-PAC 6 Clicks Daily Activity (OT)  -CL        User Key  (r) = Recorded By, (t) = Taken By, (c) = Cosigned By    Initials Name Provider Type    Martha Luis OT Occupational Therapist          Time Calculation:   Time Calculation- OT     Row Name 01/07/19 1339             Time Calculation- OT    OT Start Time  1339  -CL      OT Received On  01/07/19  -CL      OT Goal Re-Cert Due Date  01/17/19  -CL         Timed Charges    46124 - OT Therapeutic Activity Minutes  24  -CL        User Key  (r) = Recorded By, (t) = Taken By, (c) = Cosigned By    Initials Name Provider Type    Martha Luis OT Occupational Therapist        Therapy Suggested Charges     Code   Minutes Charges    58904 (CPT®) Hc Ot Neuromusc Re Education Ea 15 Min      48167 (CPT®) Hc Ot Ther Proc Ea 15 Min      66028 (CPT®) Hc Ot Therapeutic Act Ea 15 Min 24 2    12353 (CPT®) Hc Ot Manual Therapy Ea 15 Min      98444 (CPT®) Hc Ot Iontophoresis Ea 15 Min      05614 (CPT®) Hc Ot Elec Stim Ea-Per 15 Min      51338 (CPT®) Hc Ot Ultrasound Ea 15 Min      60849 (CPT®) Hc Ot Self Care/Mgmt/Train Ea 15 Min      Total  24 2        Therapy Charges for Today     Code Description Service Date Service Provider Modifiers Qty    35138054209 HC OT THERAPEUTIC ACT EA 15 MIN 1/7/2019 Martha Blackwood OT GO 2    42544277883 HC OT  RE-EVAL 2 1/7/2019 Martha Blackwood OT GO 1    [id number]  OT THER SUPP EA 15 MIN 1/7/2019 Martha Blackwood OT GO 2               Martha Blackwood OT  1/7/2019    Electronically signed by Martha Blackwood OT at 1/7/2019  4:00 PM     Martha Blackwood OT at 1/7/2019  3:59 PM          Problem: Patient Care Overview  Goal: Plan of Care Review  Outcome: Ongoing (interventions implemented as appropriate)   01/07/19 1558   OTHER   Outcome Summary OT Re-eval complete s/p ICU t/f. Pt session limited d/t lethargy and limited command following. Pt Depx2 for bed/chair t/f w/ mechanical lift. Recommend cont skilled IPOT POC. Recommend pt DC to SNF.    Coping/Psychosocial   Plan of Care Reviewed With patient;family           Electronically signed by Martha Blackwood OT at 1/7/2019  3:59 PM       Speech Language Pathology Notes (last 24 hours) (Notes from 1/7/2019  9:52 AM through 1/8/2019  9:52 AM)     No notes of this type exist for this encounter.        Respiratory Therapy Notes (last 24 hours) (Notes from 1/7/2019  9:52 AM through 1/8/2019  9:52 AM)     No notes of this type exist for this encounter.

## 2019-01-08 NOTE — PROGRESS NOTES
Adult Nutrition  Assessment/PES    Patient Name:  Todd Phillips  YOB: 1950  MRN: [id number]  Admit Date:  12/31/2018    Assessment Date:  1/8/2019    Comments:  Pt receiving PN at goal infusion rate; glucoses elevated; insulin adjustments per MDR/pharmacy. Delivery for past 24 hrs documented at 91% rx'd volume.    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 ; SOA, ileus, pneumatosis, portal vein gas     Identified At Risk by Screening Criteria  tube feeding or parenteral nutrition         Nutrition/Diet History     Row Name 01/08/19 1145          Nutrition/Diet History    Typical Food/Fluid Intake  RN reports electrolytes being replaced; pt up to chair today, pt went in and out of a fib last night; otherwise status quo     Factors Affecting Nutritional Intake  altered gastrointestinal function           Labs/Tests/Procedures/Meds     Row Name 01/08/19 1147          Labs/Procedures/Meds    Lab Results Reviewed  reviewed, pertinent     Lab Results Comments  elev Gluc noted, low K Phos noted and replaced        Diagnostic Tests/Procedures    Diagnostic Test/Procedure Reviewed  reviewed, pertinent        Medications    Pertinent Medications Reviewed  reviewed, pertinent     Pertinent Medications Comments  insulin         Physical Findings     Row Name 01/08/19 1149          Physical Findings    Tubes  -- PEG/J          Estimated/Assessed Needs     Row Name 01/08/19 1159          Weight Used For Calculations  91.5 kg (201 lb 11.5 oz)          Additional Documentation  KCAL/KG (Group);Protein Requirements (Group)          14 Kcal/Kg (kcal)  1281    15 Kcal/Kg (kcal)  1372.5     18 Kcal/Kg (kcal)  1647    20 Kcal/Kg (kcal)  1830    25 Kcal/Kg (kcal)  2287.5    30 Kcal/Kg (kcal)  2745    35 Kcal/Kg (kcal)  3202.5    40 Kcal/Kg (kcal)  3660    45 Kcal/Kg (kcal)  4117.5    50 Kcal/Kg (kcal)  4575    kcal/kg (Specify)  25          RMR ([place]-St. Jeor Equation)  1713          Weight Used For Protein Calculations  91.5 kg (201 lb 11.5 oz)    Est Protein Requirement Amount (gms/kg)  1.5 gm protein    Estimated Protein Requirements (gms/day)  137.25          Maninder-Segar Method (over 20 kg)  3330        Nutrition Prescription Ordered     Row Name 01/08/19 1153          Nutrition Prescription PO    Current PO Diet  NPO        Nutrition Prescription PN    PN Route  PICC     PN Goal Rate (mL/hr)  75 mL/hr     PN Goal Volume (mL)  1800 mL     Dextrose Concentration (%)  20 %     Amino Acid Concentration (%)  7 %     Lipid Concentration (%)  20%     Lipid Volume (mL)  200 mL     Lipid Frequency  Daily         Evaluation of Received Nutrient/Fluid Intake     Row Name 01/08/19 1155       Calculation Measurements    Weight Used For Calculations  91.5 kg (201 lb 11.5 oz)       Nutrient/Fluid Evaluation    Number of Days Evaluated  1 day    Additional Documentation  RDI (Group)       Calories Evaluation    Parenteral Calories (kcal)  2149    Total Calories (kcal)  2149    % of Kcal Needs  98       Protein Evaluation    Parenteral Protein (gm)  115    Total Protein (gm)  115    % of Protein Needs  84       Recommended Daily Intake Evaluation    RDI  Met       PN Evaluation    Number of Days PN Evaluated  1 day    PN Average delivery (mL/day)   1643.8 mL/day 91 % rx'd volume    PN Average lipid delivery (mL/day)   234.8 mL/day        Evaluation of Prescribed Nutrient/Fluid Intake     Row Name 01/08/19 1159          Weight Used For Calculations  91.5 kg (201 lb 11.5 oz)            Problem/Interventions:  Problem 1     Row Name 01/08/19 1200          Nutrition Diagnoses Problem 1    Problem 1  Altered GI  Function     Etiology (related to)  Medical Diagnosis     Gastrointestinal  Other (comment);Ileus s/p whipple, liver biopsy and portal vein resection/repair; PEG/J placement (12/31)     Signs/Symptoms (evidenced by)  PO diet not tolerated;NPO         Problem 2     Row Name 01/08/19 1201          Nutrition Diagnoses Problem 2    Problem 2  Inadequate Intake/Infusion     Inadequate Intake Type  Parenteral     Etiology (related to)  MNT for Treatment/Condition     Signs/Symptoms (evidenced by)  PN Intake Delivery     Percent (%) of  PN goal  91 %               Intervention Goal     Row Name 01/08/19 1201          Intervention Goal    General  Nutrition support treatment;Meet nutritional needs for age/condition     TF/PN  Deliver (%) goal     Deliver % of Goal  100 %         Nutrition Intervention     Row Name 01/08/19 1202          Nutrition Intervention    RD/Tech Action  Follow Tx progress;Care plan reviewd           Education/Evaluation     Row Name 01/08/19 1202          Monitor/Evaluation    Monitor  Per protocol;I&O;Pertinent labs;PN delivery/tolerance;Symptoms           Electronically signed by:  Aster Coleman MS,RD,LD  01/08/19 12:03 PM

## 2019-01-08 NOTE — PLAN OF CARE
Problem: Patient Care Overview  Goal: Plan of Care Review  Outcome: Ongoing (interventions implemented as appropriate)   01/08/19 1516   OTHER   Outcome Summary VSS. Pt ambulated to the chair with PT in the morning. Tolerated ambulating back to the bed with the nurses after a few hours in the chair. Given ice chips as tolerated. Advanced to a clear liquid diet by MD. Does appear to have some pain with movement but otherwise is drowsy. HR remains in controlled afib. Continuing to provide support to pt and spouse.    Coping/Psychosocial   Plan of Care Reviewed With spouse;patient   Plan of Care Review   Progress improving       Problem: Fall Risk (Adult)  Goal: Absence of Fall  Outcome: Ongoing (interventions implemented as appropriate)   01/07/19 0456   Fall Risk (Adult)   Absence of Fall achieves outcome       Problem: Skin Injury Risk (Adult)  Goal: Skin Health and Integrity  Outcome: Ongoing (interventions implemented as appropriate)   01/07/19 0456   Skin Injury Risk (Adult)   Skin Health and Integrity making progress toward outcome       Problem: Pain, Acute (Adult)  Goal: Acceptable Pain Control/Comfort Level  Outcome: Ongoing (interventions implemented as appropriate)   01/07/19 0456   Pain, Acute (Adult)   Acceptable Pain Control/Comfort Level unable to achieve outcome

## 2019-01-08 NOTE — PROGRESS NOTES
[facility] Progress Note        Antibiotics:  Anti-Infectives (From admission, onward)    Ordered     Dose/Rate Route Frequency Start Stop    01/07/19 0942  Linezolid (ZYVOX) 600 mg 300 mL     Ordering Provider:  Scot Higgins MD    600 mg  300 mL/hr over 60 Minutes Intravenous Every 12 Hours Scheduled 01/07/19 1100 01/17/19 0859    01/06/19 1306  meropenem (MERREM) 1 g/100 mL 0.9% NS VTB (mbp)     Ordering Provider:  Aletha Alvarez, RPH    1 g  over 3 Hours Intravenous Every 8 Hours 01/06/19 1600 01/12/19 2359    01/05/19 1820  micafungin 100 mg/100 mL 0.9% NS IVPB (mbp)     Ordering Provider:  Fermin Cabrera MD    100 mg  over 60 Minutes Intravenous Every 24 Hours 01/05/19 2000 01/12/19 1959 01/05/19 1859  meropenem (MERREM) 1 g/100 mL 0.9% NS VTB (mbp)     Ordering Provider:  Levi Bustos RPH    1 g  over 30 Minutes Intravenous Once 01/05/19 1945 01/05/19 2039 01/05/19 1859  vancomycin 2250 mg/500 mL 0.9% NS IVPB ([facility])     Ordering Provider:  Levi Bustos RPH    2,250 mg Intravenous Once 01/05/19 1945 01/05/19 2009 01/01/19 0936  piperacillin-tazobactam (ZOSYN) 3.375 g in iso-osmotic dextrose 50 ml (premix)     Ordering Provider:  Miquel Brody MD    3.375 g  over 30 Minutes Intravenous Once 01/01/19 1030 01/01/19 1209    12/31/18 0632  ceFAZolin in dextrose (ANCEF) IVPB solution 2 g     Ordering Provider:  Miquel Brody MD    2 g  over 30 Minutes Intravenous Once 12/31/18 0634 12/31/18 0800    12/31/18 0632  metroNIDAZOLE (FLAGYL) IVPB 500 mg     Ordering Provider:  Miquel Brody MD    500 mg  over 60 Minutes Intravenous Once 12/31/18 0634 12/31/18 0830          CC: fatigue, abd pain    HPI:    Patient is a 68 y.o.  Yr old male with history of biliary tract obstruction, admitted to [facility] multiple times in the last 2 months including August 10 until August 16, underwent cholecystectomy with pathology suggesting chronic cholecystitis,  had ERCP on August 14 with biliary stricture/proximal duct dilation and had stent placement.  He was readmitted August 21, 2018 until August 25, 2018 with acute sepsis, Klebsiella bacteremia, discharged with oral antibiotics.  Readmitted September 12, 2018 until September 17, 2018 with ESBL Escherichia coli bacteremia seen by infectious disease in [place] and treated with Invanz, duration of therapy unclear but approximately until September 24.  During that period of time, concern for malignancy mentioned in notes and referred to [facility] for further biopsy, done September 28.  Presented once again to  emergency room September 30, 2018 with acute onset abdominal pain/nausea/vomiting.  Blood cultures positive again with  Klebsiella species and  Transferred to [facility]. 10/1/18 ERCP with evidence for obstruction/purulence and new stent placed; He was treated with rocephin/flagyl and last seen by us early October; after that, he had more definitive diagnosis of pancreatic cancer and treated with neoadjuvant chemotherapy and right chest port placement.  In addition he required TPN.  Family not aware of any other specific microbiologic diagnosis since October; he was admitted December 31 and underwent pancreaticoduodenectomy, wedge biopsy of liver tumor and portal vein resection/lateral repair with gastrojejunostomy tube placement.  Moved to ICU on January 5 with concern regarding increased confusion/Tachypnea, abnormal CT scan with pneumatosis/ileus and portal venous gas in addition to intra-abdominal fluid per description of radiology.  Chest x-ray January 6 with low lung volumes and increased markings at lung bases bilaterally but no focal parenchymal opacification per radiology.  Head CT no acute intracranial abnormality.     1/8/19 Patient  sleepy/Confused and does not cooperate with a history or review of systems.  He answers yes/no to some simple questions but  "unclear reliability.  Nursing reports left arm PICC line new on January 6 and stable, chronic right port in the chest with no new redness/swelling or change there.  Indwelling Hassan catheter, G/J-tube, and ostomy bag over prior MADDY drain site; MADDY drain removed January 6.  No stool output since admission per nursing.  No rash.  On nasal cannula oxygen with no productive cough.  Patient's alertness better January 7-8 compared to January 6 per nursing. LG fever at 100 last 24 hours     Patient does not cooperate with ROS otherwise      ROS:        1/8/19 as above, patient confused and not cooperative with review of systems          PE:   /87   Pulse 90   Temp 99.9 °F (37.7 °C) (Core)   Resp 26   Ht 182.9 cm (72\")   Wt 91.5 kg (201 lb 12.8 oz)   SpO2 93%   BMI 27.37 kg/m²       GENERAL: He opens his eyes, follows simple commands but not oriented.  Nasal cannula oxygen  HEENT: Normocephalic, atraumatic.  PERRL. EOMI. No conjunctival injection. No icterus. Oropharynx clear without evidence of thrush or exudate. No evidence of peridontal disease.    NECK: Supple without nuchal rigidity. No mass.  LYMPH: No cervical, axillary or inguinal lymphadenopathy.  HEART: RRR; No murmur, rubs, gallops.   LUNGS: Diminished at bases bilaterally without wheezing, rales, rhonchi. Normal respiratory effort. Nonlabored. No dullness.  ABDOMEN: Soft, tender and distended. Positive bowel sounds managed. No rebound or guarding. NO mass or HSM.  Ostomy bag noted at right abdomen but no active drainage or fluid in bag.  Surgical site noted with no redness induration or warmth.  No mass bulge or fluctuance.  No crepitus or bulla.  Feeding tube site noted with no redness induration or warmth.  No mass bulge or fluctuance appreciated.  No crepitus or bulla.  No purulence or skin necrosis  EXT:  No cyanosis, clubbing. No cord.  : Genitalia generally unremarkable.  With Hassan catheter.  MSK: FROM without joint effusions noted arms/legs. "    SKIN: Warm and dry without cutaneous eruptions on Inspection/palpation.    NEURO: He moves all 4 extremities spontaneously but he is not cooperate with a detailed motor or sensory exam     Right chest port with no redness induration or warmth.  No crepitus or bulla.  No purulence     Left arm PICC line with no redness or purulence     No peripheral stigmata/phenomena of endocarditis        Laboratory Data    Results from last 7 days   Lab Units  01/08/19   0311  01/07/19   0750  01/06/19   0506   WBC 10*3/mm3  20.45*  23.91*  18.36*   HEMOGLOBIN g/dL  7.9*  8.7*  9.0*   HEMATOCRIT %  23.4*  25.8*  27.1*   PLATELETS 10*3/mm3  297  297  279     Results from last 7 days   Lab Units  01/08/19   0311   SODIUM mmol/L  131*   POTASSIUM mmol/L  3.3*   CHLORIDE mmol/L  101   CO2 mmol/L  23.0   BUN mg/dL  28*   CREATININE mg/dL  0.88   GLUCOSE mg/dL  230*   CALCIUM mg/dL  8.1*     Results from last 7 days   Lab Units  01/08/19   0311   ALK PHOS U/L  181*   BILIRUBIN mg/dL  0.8   ALT (SGPT) U/L  28   AST (SGOT) U/L  43*         Results from last 7 days   Lab Units  01/06/19   1406   CRP mg/dL  20.71*       Estimated Creatinine Clearance: 101.1 mL/min (by C-G formula based on SCr of 0.88 mg/dL).      Microbiology:      Radiology:  Imaging Results (last 72 hours)     Procedure Component Value Units Date/Time    XR Chest 1 View [[id number]] Collected:  01/06/19 0915     Updated:  01/07/19 1411    Narrative:          EXAMINATION: XR CHEST 1 VW - 1/6/2019     INDICATION: Z01.89-Encounter for other specified special examinations;  C25.9-Malignant neoplasm of pancreas, unspecified; Z74.09-Other reduced  mobility      COMPARISON: 01/05/2019     FINDINGS: Portable chest reveals low lung volumes. Deep line catheter on  the right with tip in the SVC. Mild increased markings at lung bases  bilaterally with degenerative changes seen within the spine.  No focal  parenchymal opacification present.       Impression:       Low lung volumes  with increased markings at the lung bases  bilaterally.     DICTATED:   1/6/2019  EDITED/ls :   1/6/2019      This report was finalized on 1/7/2019 2:09 PM by Dr. Tasha Guzmán MD.       CT Head Without Contrast [[id number]] Collected:  01/06/19 1803     Updated:  01/07/19 1410    Narrative:       EXAMINATION: CT HEAD WO CONTRAST - 1/6/2019     INDICATION:  Z01.89-Encounter for other specified special examinations;  C25.9-Malignant neoplasm of pancreas, unspecified; Z74.09-Other reduced  mobility. Mental status change.     TECHNIQUE: Multiple axial CT imaging is obtained of the head without the  administration of intravenous contrast.     The radiation dose reduction device was turned on for each scan per the  ALARA (As Low as Reasonably Achievable) protocol.     COMPARISON: 11/17/2018     FINDINGS: Atrophy of the brain with low-density area seen in the  periventricular and subcortical white matter suggesting chronic small  vessel ischemic change. No hemorrhage or hydrocephalus. No mass, mass  effect, or midline shift. No abnormal extra-axial fluid collections  identified. The bony structures reveal no evidence of osseous  abnormality. The visualized paranasal sinuses are clear. The mastoid air  cells are patent.       Impression:       Chronic changes seen within the brain with no acute  intracranial abnormality identified.     DICTATED:   1/6/2019  EDITED/ls :   1/6/2019         This report was finalized on 1/7/2019 2:08 PM by Dr. Tasha Guzmán MD.       XR Chest 1 View [[id number]] Collected:  01/05/19 1928     Updated:  01/06/19 1008    Narrative:          EXAMINATION: XR CHEST 1 VW - 1/5/2019     INDICATION:  Z01.89-Encounter for other specified special examinations;  C25.9-Malignant neoplasm of pancreas, unspecified; Z74.09-Other reduced  mobility      COMPARISON: 01/05/2019     FINDINGS: Portable chest reveals deep line catheter on the right with  tip in the SVC. Mild increased markings at left lung  base with small  left pleural effusion. Improvement seen in aeration of the lung bases in  the interval. Degenerative change is seen within the spine. Pulmonary  vascularity is within normal limits.           Impression:       Improvement seen in aeration of the lung bases in the  interval with only minimal right residual markings at the lung bases.     DICTATED:   1/5/2019  EDITED/ls :   1/5/2019      This report was finalized on 1/6/2019 10:06 AM by Dr. Tasha Guzmán MD.       CT Abdomen Pelvis Without Contrast [[id number]] Collected:  01/05/19 1645     Updated:  01/05/19 1655    Narrative:       EXAMINATION: CT ABDOMEN/PELVIS WO CONTRAST - 1/5/2019      INDICATION:  Z01.89-Encounter for other specified special examinations;  C25.9-Malignant neoplasm of pancreas, unspecified; Z74.09-Other reduced  mobility. Abdominal pain.     TECHNIQUE: Multiple axial CT imaging is obtained of the abdomen and  pelvis without the administration of oral or intravenous contrast.     The radiation dose reduction device was turned on for each scan per the  ALARA (As Low as Reasonably Achievable) protocol.     COMPARISON: NONE     FINDINGS: There are small bilateral pleural effusions. Atelectatic  change is seen in the lung bases bilaterally. Extraluminal air is  identified throughout the upper abdomen. There is fluid seen surrounding  the spleen as well as the liver. There is a small to  moderate size  hiatal hernia. There is gastric distention. There are postoperative  changes seen from Whipple procedure. There is a small collection of  fluid identified along the leftward aspect of the liver. There is some  free fluid identified near the sarbjit hepatis. There is a G-tube  identified in satisfactory position. Kidneys and adrenal glands within  normal limits. There is gaseous distention of the small bowel loops  suggesting a postoperative ileus. There is air identified within the  mesenteric vessels. Findings are concerning for  pneumatosis of several  small bowel loops centrally within the abdomen. There is fluid seen  scattered throughout the colon. Mild distention of the colon. There is  some fluid seen within the pelvis.     Pelvis: The pelvic portion of the gastrointestinal tract is within  normal limits. Fluid seen within the colon. The bladder reveals  air-fluid level likely from prior instrumentation. No pelvic adenopathy.  Bony structures reveal no evidence of osseous abnormality.       Impression:       There are small bilateral pleural effusions. There is a  fluid collection seen surrounding the liver and spleen containing air in  the fluid collection around the liver. There is air seen scattered  throughout the abdomen which may be postoperative. There is air  identified within the mesenteric vessels with findings suggesting  pneumatosis throughout scattered small bowel loops within the mid  abdomen. Fluid throughout the colon suggesting clinical presentation of  diarrhea. Gastrostomy tube is in satisfactory position.     DICTATED:   1/5/2019  EDITED/ls :   1/5/2019      This report was finalized on 1/5/2019 4:53 PM by Dr. Tasha Guzmán MD.               Impression:     --Acute postoperative leukocytosis and low-grade fever, approx , at risk for infection multiple sites including abdomen, lung, urine, IV lines, etc.  IV sites look benign at present and Blood cultures negative so far.  Urinalysis with negative leuk esterase/nitrites and UTI generally less likely at present.  Low lung volumes on chest x-ray not unexpected after abdominal surgery, currently no cough and while he is at risk for pneumonia, unable to confirm at present.  Abdomen remains primary concern with enterocolitis, postoperative fluid and on empiric antibiotics with these concerns in mind with Zyvox/Merrem and micafungin.  At risk for MDR organisms with prior history ESBL and multiple hospitalizations, etc.     --Acute postoperative enterocolitis.   Recent surgery as outlined above in the setting of pancreatic cancer, prior neoadjuvant chemotherapy and tube feeding.  Tube feedings stopped and TPN restarted.  Dr. Brody following closely to determine timing/option/threshold for further surgical intervention or serial imaging/percutaneous drainage etc.  I d/w Dr Brody     --Pancreatic cancer with prior neoadjuvant chemotherapy, indwelling chronic port and recent surgery as outlined above.     --COPD     --Diabetes type 2     --Chronic anticoagulation with history A. fib and past antiarrhythmic therapy     --Hx ESBL    PLAN:      --IV Zyvox/Merrem and Mycamine     --Check/review labs cultures and scans     --Discussed with family, history per them     --History per nursing as well     --Discussed with microbiology     --Highly complex set of issues with high risk for further serious morbidity and other serious sequela    --d/w Dr Brody     --If not steadily improving, you should give consideration to serial imaging and consideration for further surgical intervention versus interventional radiology options, etc.           Scot Higgins MD  1/8/2019

## 2019-01-08 NOTE — PROGRESS NOTES
"Patient Name:  Todd Phillips  YOB: 1950  [id number]    Surgery Progress Note    Date of visit: 1/8/2019    Subjective   Subjective: Somewhat more awake today, up in chair.  A fib with RVR requiring IV metoprolol overnight but currently stable.  Tolerating small amount of liquids PO.  No bowel function.           Objective     Objective:     /97 (BP Location: Right arm, Patient Position: Sitting)   Pulse 101   Temp 98.8 °F (37.1 °C) (Oral)   Resp 24   Ht 182.9 cm (72\")   Wt 91.5 kg (201 lb 12.8 oz)   SpO2 93%   BMI 27.37 kg/m²     Intake/Output Summary (Last 24 hours) at 1/8/2019 1105  Last data filed at 1/8/2019 1016  Gross per 24 hour   Intake 3729.2 ml   Output 2175 ml   Net 1554.2 ml       CV:  Rhythm irregular and rate regular  L:  Mildly coarse to auscultation bilaterally   Abd:  Mildly distended, soft, tender throughout but without peritoneal signs, incision clean dry and intact, former drain site with ostomy appliance and minimal serous drainage  Ext:  No cyanosis, clubbing, edema    Labs that are back at this time have been reviewed.   WBC trending down 20.5 from 25 today.        Assessment/Plan     Assessment/ Plan:      69 yo man s/p Whipple 12/31/18, now with adult necrotizing enterocolitis.  Moved to ICU 1/5/19 and now slowly improving.      - continue sips/chips/popcicles and TPN  - continue broad antibiotics, ID following   - mobilize as much as possible, pulmonary toilet   - continue ICU care  - awaiting return of bowel function, if no improvement will assess need for further imaging      Hospital Problem List     * (Principal) Malignant neoplasm of head of pancreas (CMS/HCC)    Overview Signed 9/5/2018  9:58 AM by Olya Vaughn     Added automatically from request for surgery [id number]         Hyperlipidemia (Chronic)        COPD (chronic obstructive pulmonary disease) (CMS/HCC) (Chronic)        Essential hypertension (Chronic)        Gastroesophageal reflux disease without " esophagitis (Chronic)    DM2 (diabetes mellitus, type 2) (CMS/HCC) (Chronic)        Paroxysmal atrial fibrillation (Chronic)    Chronic anticoagulation, Eliquis (Chronic)    Post-operative confusion and somnolence, likely due to oversedation        Atrial fibrillation with RVR (CMS/HCC)    Adult necrotizing enterocolitis (CMS/HCC)    Encephalopathy              Nisha Ackerman MD  1/8/2019  11:05 AM

## 2019-01-08 NOTE — PROGRESS NOTES
Continued Stay Note  [facility]     Patient Name: Todd Phillips  MRN: [id number]  Today's Date: 1/8/2019    Admit Date: 12/31/2018    Discharge Plan     Row Name 01/08/19 0947       Plan    Plan Comments  Talked to Mrs. Phillips at .  Mr. Phillips is sleeping.  We discussed the possible need for SNF when he is medically ready.  She wants to take him home and continue [facility].  She agreed to let me refer him to [facility]. She is agreeable to have a bed available if he needs SNF when medically ready for discharge.          Discharge Codes    No documentation.       Expected Discharge Date and Time     Expected Discharge Date Expected Discharge Time    Jan 12, 2019             Lico Cadena RN

## 2019-01-08 NOTE — THERAPY RE-EVALUATION
Acute Care - Physical Therapy Re-Evaluation  [facility]     Patient Name: Todd Phillips  : 1950  MRN: [id number]  Today's Date: 2019   Onset of Illness/Injury or Date of Surgery: 18  Date of Referral to PT: 19  Referring Physician: MD Mayuri      Admit Date: 2018    Visit Dx:     ICD-10-CM ICD-9-CM   1. Impaired functional mobility, balance, gait, and endurance Z74.09 V49.89   2. Laboratory test Z01.89 V72.60   3. Pancreatic cancer (CMS/HCC) C25.9 157.9   4. 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)   • DM2 (diabetes mellitus, type 2) (CMS/HCC)   • Paroxysmal atrial fibrillation   • Chronic anticoagulation, Eliquis   • Encounter for monitoring flecainide therapy   • Malignant neoplasm of head of pancreas (CMS/HCC)   • Bacteremia due to Escherichia coli   • Biliary obstruction   • Thrombocytopenia (CMS/HCC)   • Anemia due to chemotherapy   • Fever   • Post-operative confusion and somnolence, likely due to oversedation   • Atrial fibrillation with RVR (CMS/HCC)   • Adult necrotizing enterocolitis (CMS/HCC)   • Encephalopathy     Past Medical History:   Diagnosis Date   • Antral gastritis    • Asthma    • Atrial fibrillation (CMS/HCC)     treated with oral blood thinner   • Chest pain    • COPD (chronic obstructive pulmonary disease) (CMS/HCC)    • Coronary artery disease     no stents   • Diabetes mellitus (CMS/HCC)     type 2 - checks sugar 4 times per day    • Duodenitis    • Elevated cholesterol    • Emphysema of lung (CMS/HCC)    • Gallbladder disease     removed    • GERD (gastroesophageal reflux disease)    •  Hard to intubate     busted lip last time   • Hyperlipidemia    • Hypertension    • Jaundice    • Kidney stone    • Kidney stones     had lithotripsy and passed 36 kidney stones as well as had one surgically removed.   • Malignant neoplasm of head of pancreas (CMS/HCC) 9/5/2018   • Nausea    • Obstructive sleep apnea on CPAP     compliant with machine    • Palpitations    • Pneumonia 08/2018   • Reflux esophagitis    • Renal failure     stage 3 kidney disease   • Sepsis (CMS/HCC)      Past Surgical History:   Procedure Laterality Date   • BILE DUCT STENT PLACEMENT      [facility] 2 weeks ago    • CARDIAC CATHETERIZATION  11/01/1999   • CARDIOVASCULAR STRESS TEST  09/2012   • CHOLECYSTECTOMY N/A 8/10/2018    Procedure: CHOLECYSTECTOMY LAPAROSCOPIC;  Surgeon: Ronak oDwns MD;  Location:  [facility];  Service: General   • COLONOSCOPY     • CYSTOSCOPY BLADDER STONE LITHOTRIPSY     • ECHO - CONVERTED  09/2012   • ERCP N/A 8/14/2018    Procedure: ENDOSCOPIC RETROGRADE CHOLANGIOPANCREATOGRAPHY WITH PAPILLOTOMY;  Surgeon: Scot Su MD;  Location:  [facility];  Service: Gastroenterology   • ERCP N/A 10/1/2018    Procedure: ENDOSCOPIC RETROGRADE CHOLANGIOPANCREATOGRAPHY;  Surgeon: Jefry Luna MD;  Location:  [facility];  Service: Gastroenterology   • KIDNEY STONE SURGERY     • KIDNEY STONE SURGERY      open abdominal surgery   • PORTACATH PLACEMENT Right 10/23/2018    Procedure: INSERTION OF PORTACATH;  Surgeon: Ronak Downs MD;  Location:  [facility];  Service: General   • TONSILLECTOMY     • UPPER GASTROINTESTINAL ENDOSCOPY  08/30/2012   • WHIPPLE PROCEDURE N/A 12/31/2018    Procedure: WHIPPLE PROCEDURE, BIOPSY OF LIVER, PORTAL VEIN RESECTION AND REPAIR, G/J TUBE PLACEMENT;  Surgeon: Miquel Brody MD;  Location:  [facility];  Service: General        PT ASSESSMENT (last 12 hours)      Physical Therapy Evaluation     Row Name 01/08/19 0818          PT Evaluation  Time/Intention    Subjective Information  no complaints  -LS     Document Type  re-evaluation  -LS     Mode of Treatment  physical therapy  -LS     Patient Effort  good  -LS     Symptoms Noted During/After Treatment  fatigue  -LS     Row Name 01/08/19 0818          General Information    Patient Profile Reviewed?  yes  -LS     Onset of Illness/Injury or Date of Surgery  12/31/18  -LS     Referring Physician  MD Mayuri  -LS     Patient Observations  alert;cooperative;agree to therapy  -LS     Prior Level of Function  -- see IE  -LS     Pertinent History of Current Functional Problem  Please see IE for original admit; pt s/p transfer to ICU on 1/5 with worsening resp status. RESUME PT orders received.   -LS     Existing Precautions/Restrictions  fall;oxygen therapy device and L/min G/j tube; abd incision; colostomy  -LS     Limitations/Impairments  safety/cognitive  -LS     Risks Reviewed  patient:;spouse/S.O.:;LOB;dizziness;increased discomfort;change in vital signs  -LS     Benefits Reviewed  patient:;spouse/S.O.:;improve function;increase independence;increase strength;increase knowledge  -LS     Barriers to Rehab  medically complex  -LS     Row Name 01/08/19 0818          Relationship/Environment    Family Caregiver if Needed  -- see IE  -LS     Row Name 01/08/19 0818          Resource/Environmental Concerns    Current Living Arrangements  home/apartment/condo  -LS     Row Name 01/08/19 0818          Cognitive Assessment/Intervention- PT/OT    Affect/Mental Status (Cognitive)  low arousal/lethargic;flat/blunted affect  -LS     Orientation Status (Cognition)  oriented to;person  -LS     Follows Commands (Cognition)  follows one step commands;25-49% accuracy  -LS     Cognitive Function (Cognitive)  safety deficit  -LS     Safety Deficit (Cognitive)  moderate deficit;awareness of need for assistance;judgment;safety precautions awareness  -LS     Personal Safety Interventions  fall prevention program maintained;gait  belt;nonskid shoes/slippers when out of bed  -     Row Name 01/08/19 0818          Safety Issues, Functional Mobility    Impairments Affecting Function (Mobility)  balance;cognition;coordination;endurance/activity tolerance;strength;shortness of breath  -     Row Name 01/08/19 0818          Bed Mobility Assessment/Treatment    Supine-Sit [place] (Bed Mobility)  maximum assist (25% patient effort);2 person assist;verbal cues  -     Assistive Device (Bed Mobility)  bed rails;draw sheet;head of bed elevated  -     Row Name 01/08/19 0818          Transfer Assessment/Treatment    Transfer Assessment/Treatment  sit-stand transfer;stand-sit transfer;bed-chair transfer  -     Comment (Transfers)  PT/tech on either side of pt.  -     Bed-Chair [place] (Transfers)  maximum assist (25% patient effort);2 person assist;verbal cues  -     Assistive Device (Bed-Chair Transfers)  -- BUE support via PT/tech and gait belt use  -     Sit-Stand [place] (Transfers)  moderate assist (50% patient effort);2 person assist;verbal cues  -     Stand-Sit [place] (Transfers)  moderate assist (50% patient effort);2 person assist;verbal cues  -     Row Name 01/08/19 0818          Gait/Stairs Assessment/Training    [place] Level (Gait)  unable to assess  -     Row Name 01/08/19 0818          General ROM    GENERAL ROM COMMENTS  BLE WFL (AAROM)  -[facility] Name 01/08/19 0818          MMT (Manual Muscle Testing)    General MMT Comments  pt unable to follow formal commands for MMT; grossly 3-/5 as demonstrated functionally  -[facility] Name 01/08/19 0818          Motor Assessment/Intervention    Additional Documentation  Balance (Group);Therapeutic Exercise (Group)  -     Row Name 01/08/19 0818          Therapeutic Exercise    91982 - PT Therapeutic Activity Minutes  15  -     Row Name 01/08/19 0818          Static Sitting Balance    Level of [place] (Unsupported Sitting, Static Balance)   moderate assist, 50 to 74% patient effort  -LS     Sitting Position (Unsupported Sitting, Static Balance)  sitting on edge of bed  -LS     Time Able to Maintain Position (Unsupported Sitting, Static Balance)  2 to 3 minutes  -LS     Comment (Unsupported Sitting, Static Balance)  progressed to min A  -     Row Name 01/08/19 0818          Static Standing Balance    Level of [place] (Supported Standing, Static Balance)  moderate assist, 50 to 74% patient effort  -LS     Comment (Supported Standing, Static Balance)  BUE support  -     Row Name 01/08/19 0818          Sensory Assessment/Intervention    Sensory General Assessment  no sensation deficits identified BLE  -     Row Name 01/08/19 0818          Pain Assessment    Additional Documentation  Pain Scale: FACES Pre/Post-Treatment (Group)  -     Row Name 01/08/19 0818          Pain Scale: Numbers Pre/Post-Treatment    Pain Location - Orientation  incisional  -LS     Pain Location  abdomen  -LS     Pain Intervention(s)  Repositioned;Ambulation/increased activity  -     Row Name 01/08/19 0818          Pain Scale: FACES Pre/Post-Treatment    Pain: FACES Scale, Pretreatment  0-->no hurt  -LS     Pain: FACES Scale, Post-Treatment  2-->hurts little bit  -LS     Pre/Post Treatment Pain Comment  tolerated  -     Row Name 01/08/19 0818          Plan of Care Review    Plan of Care Reviewed With  patient;spouse  -     Row Name 01/08/19 0818          Physical Therapy Clinical Impression    Date of Referral to PT  01/07/19  -     PT Diagnosis (PT Clinical Impression)  impaired functional mobility, balance, gait  -LS     Patient/Family Goals Statement (PT Clinical Impression)  return to PLOF  -     Criteria for Skilled Interventions Met (PT Clinical Impression)  yes;treatment indicated  -LS     Rehab Potential (PT Clinical Summary)  good, to achieve stated therapy goals  -     Care Plan Review (PT)  evaluation/treatment results reviewed  -     Care Plan  Review, Other Participant (PT Clinical Impression)  spouse  -LS     Row Name 01/08/19 0818          Vital Signs    Pre Systolic BP Rehab  152  -LS     Pre Treatment Diastolic BP  83  -LS     Posttreatment Heart Rate (beats/min)  98  -LS     Pre SpO2 (%)  93  -LS     O2 Delivery Pre Treatment  supplemental O2  -LS     Post SpO2 (%)  92  -LS     O2 Delivery Post Treatment  supplemental O2  -LS     Pre Patient Position  Supine  -LS     Intra Patient Position  Standing  -LS     Post Patient Position  Sitting  -LS     Row Name 01/08/19 0818          Bed Mobility Goal 1 (PT)    Activity/Assistive Device (Bed Mobility Goal 1, PT)  sit to supine/supine to sit  -LS     [place] Level/Cues Needed (Bed Mobility Goal 1, PT)  minimum assist (75% or more patient effort)  -LS     Time Frame (Bed Mobility Goal 1, PT)  2 weeks  -LS     Progress/Outcomes (Bed Mobility Goal 1, PT)  goal ongoing  -     Row Name 01/08/19 0818          Transfer Goal 1 (PT)    Activity/Assistive Device (Transfer Goal 1, PT)  sit-to-stand/stand-to-sit;walker, rolling  -LS     [place] Level/Cues Needed (Transfer Goal 1, PT)  minimum assist (75% or more patient effort)  -LS     Time Frame (Transfer Goal 1, PT)  2 weeks  -LS     Progress/Outcome (Transfer Goal 1, PT)  goal ongoing  -     Row Name 01/08/19 0818          Gait Training Goal 1 (PT)    Activity/Assistive Device (Gait Training Goal 1, PT)  gait (walking locomotion);assistive device use;walker, rolling  -LS     [place] Level (Gait Training Goal 1, PT)  minimum assist (75% or more patient effort)  -LS     Distance (Gait Goal 1, PT)  100  -LS     Time Frame (Gait Training Goal 1, PT)  2 weeks  -LS     Progress/Outcome (Gait Training Goal 1, PT)  goal revised this date;goal ongoing  -     Row Name 01/08/19 0818          Positioning and Restraints    Pre-Treatment Position  in bed  -LS     Post Treatment Position  chair  -LS     In Chair  notified nsg;reclined;call light within  reach;encouraged to call for assist;with family/caregiver;RUE elevated;LUE elevated;waffle cushion;on mechanical lift sling;legs elevated;heels elevated  -       User Key  (r) = Recorded By, (t) = Taken By, (c) = Cosigned By    Initials Name Provider Type    Yakelin Pop PT Physical Therapist        Physical Therapy Education     Title: PT OT SLP Therapies (In Progress)     Topic: Physical Therapy (In Progress)     Point: Mobility training (In Progress)     Learning Progress Summary           Patient Acceptance, E,D, VU,NR by SUMAN at 1/8/2019  8:18 AM    Acceptance, E, NR by VG at 1/4/2019  3:04 PM    Comment:  Educated on HEP and correct mechanics for safe functional mobility. Reinforcement needed d/t current cognitive status.    Acceptance, E, NR by VG at 1/3/2019  9:35 AM    Comment:  Educated on HEP and correct mechanics for safe functional mobility. Reinforcement needed d/t impaired safety awareness.    Acceptance, E,D, NR by ILEANA at 1/1/2019  2:22 PM   Family Acceptance, E, NR by VG at 1/4/2019  3:04 PM    Comment:  Educated on HEP and correct mechanics for safe functional mobility. Reinforcement needed d/t current cognitive status.    Acceptance, E, NR by VG at 1/3/2019  9:35 AM    Comment:  Educated on HEP and correct mechanics for safe functional mobility. Reinforcement needed d/t impaired safety awareness.   Significant Other Acceptance, E,D, VU,NR by SUMAN at 1/8/2019  8:18 AM    Acceptance, E,D, NR by ILEANA at 1/1/2019  2:22 PM                   Point: Home exercise program (In Progress)     Learning Progress Summary           Patient Acceptance, E,D, VU,NR by SUMAN at 1/8/2019  8:18 AM    Acceptance, E, NR by VG at 1/4/2019  3:04 PM    Comment:  Educated on HEP and correct mechanics for safe functional mobility. Reinforcement needed d/t current cognitive status.    Acceptance, E, NR by VG at 1/3/2019  9:35 AM    Comment:  Educated on HEP and correct mechanics for safe functional mobility. Reinforcement  needed d/t impaired safety awareness.    Acceptance, E,D, NR by SJ at 1/1/2019  2:22 PM   Family Acceptance, E, NR by VG at 1/4/2019  3:04 PM    Comment:  Educated on HEP and correct mechanics for safe functional mobility. Reinforcement needed d/t current cognitive status.    Acceptance, E, NR by VG at 1/3/2019  9:35 AM    Comment:  Educated on HEP and correct mechanics for safe functional mobility. Reinforcement needed d/t impaired safety awareness.   Significant Other Acceptance, E,D, VU,NR by LS at 1/8/2019  8:18 AM    Acceptance, E,D, NR by SJ at 1/1/2019  2:22 PM                   Point: Body mechanics (In Progress)     Learning Progress Summary           Patient Acceptance, E,D, VU,NR by LS at 1/8/2019  8:18 AM    Acceptance, E, NR by VG at 1/4/2019  3:04 PM    Comment:  Educated on HEP and correct mechanics for safe functional mobility. Reinforcement needed d/t current cognitive status.    Acceptance, E, NR by VG at 1/3/2019  9:35 AM    Comment:  Educated on HEP and correct mechanics for safe functional mobility. Reinforcement needed d/t impaired safety awareness.    Acceptance, E,D, NR by SJ at 1/1/2019  2:22 PM   Family Acceptance, E, NR by VG at 1/4/2019  3:04 PM    Comment:  Educated on HEP and correct mechanics for safe functional mobility. Reinforcement needed d/t current cognitive status.    Acceptance, E, NR by VG at 1/3/2019  9:35 AM    Comment:  Educated on HEP and correct mechanics for safe functional mobility. Reinforcement needed d/t impaired safety awareness.   Significant Other Acceptance, E,D, VU,NR by LS at 1/8/2019  8:18 AM    Acceptance, E,D, NR by ILEANA at 1/1/2019  2:22 PM                   Point: Precautions (In Progress)     Learning Progress Summary           Patient Acceptance, E,D, VU,NR by LS at 1/8/2019  8:18 AM    Acceptance, E, NR by VG at 1/4/2019  3:04 PM    Comment:  Educated on HEP and correct mechanics for safe functional mobility. Reinforcement needed d/t current cognitive  status.    Acceptance, E, NR by VG at 1/3/2019  9:35 AM    Comment:  Educated on HEP and correct mechanics for safe functional mobility. Reinforcement needed d/t impaired safety awareness.    Acceptance, E,D, NR by SJ at 1/1/2019  2:22 PM   Family Acceptance, E, NR by VG at 1/4/2019  3:04 PM    Comment:  Educated on HEP and correct mechanics for safe functional mobility. Reinforcement needed d/t current cognitive status.    Acceptance, E, NR by VG at 1/3/2019  9:35 AM    Comment:  Educated on HEP and correct mechanics for safe functional mobility. Reinforcement needed d/t impaired safety awareness.   Significant Other Acceptance, E,D, VU,NR by SUMAN at 1/8/2019  8:18 AM    Acceptance, E,D, NR by ILEANA at 1/1/2019  2:22 PM                               User Key     Initials Effective Dates Name Provider Type Discipline     06/19/15 -  Reta Iqbal, PT Physical Therapist PT     06/19/15 -  Yakelin Alberts, PT Physical Therapist PT    [facility] 05/29/18 -  Maribeth Lynn, PT Physical Therapist PT              PT Recommendation and Plan  Anticipated Discharge Disposition (PT): skilled nursing facility  Planned Therapy Interventions (PT Eval): balance training, bed mobility training, gait training, home exercise program, patient/family education, strengthening, transfer training  Therapy Frequency (PT Clinical Impression): daily  Outcome Summary/Treatment Plan (PT)  Anticipated Discharge Disposition (PT): skilled nursing facility  Plan of Care Reviewed With: patient, spouse  Outcome Summary: PT re-eval completed. Pt continues to demonstrate generalized weakness and decreased indep re: functional mobility, warranting further skilled PT services to promote PLOF. Limited today by lethargy; goals revised to reflect current functional status. Able to perform stand-pivot t/f to recliner with max x2 A. Recommend SNF at d/c.   Outcome Measures     Row Name 01/08/19 0818 01/07/19 7825          How much help from another person do you  currently need...    Turning from your back to your side while in flat bed without using bedrails?  2  -LS  --     Moving from lying on back to sitting on the side of a flat bed without bedrails?  2  -LS  --     Moving to and from a bed to a chair (including a wheelchair)?  2  -LS  --     Standing up from a chair using your arms (e.g., wheelchair, bedside chair)?  2  -LS  --     Climbing 3-5 steps with a railing?  1  -LS  --     To walk in hospital room?  1  -LS  --     AM-PAC 6 Clicks Score  10  -LS  --        How much help from another is currently needed...    Putting on and taking off regular lower body clothing?  --  1  -CL     Bathing (including washing, rinsing, and drying)  --  1  -CL     Toileting (which includes using toilet bed pan or urinal)  --  1  -CL     Putting on and taking off regular upper body clothing  --  2  -CL     Taking care of personal grooming (such as brushing teeth)  --  2  -CL     Eating meals  --  1  -CL     Score  --  8  -CL        Functional Assessment    Outcome Measure Options  AM-PAC 6 Clicks Basic Mobility (PT)  -LS  AM-PAC 6 Clicks Daily Activity (OT)  -CL       User Key  (r) = Recorded By, (t) = Taken By, (c) = Cosigned By    Initials Name Provider Type    Yakelin Pop, PT Physical Therapist    CL Martha Blackwood, OT Occupational Therapist         Time Calculation:   PT Charges     Row Name 01/08/19 0818             Time Calculation    Start Time  0818  -      PT Received On  01/08/19  -      PT Goal Re-Cert Due Date  01/18/19  -         Time Calculation- PT    Total Timed Code Minutes- PT  15 minute(s)  -LS         Timed Charges    92960 - PT Therapeutic Activity Minutes  15  -LS        User Key  (r) = Recorded By, (t) = Taken By, (c) = Cosigned By    Initials Name Provider Type    Yakelin Pop, PT Physical Therapist        Therapy Suggested Charges     Code   Minutes Charges    11056 (CPT®)  Pt Neuromusc Re Education Ea 15 Min      08769 (CPT®) Hc Pt Ther Proc  Ea 15 Min      22437 (CPT®) Hc Gait Training Ea 15 Min      77972 (CPT®) Hc Pt Therapeutic Act Ea 15 Min 15 1    24906 (CPT®) Hc Pt Manual Therapy Ea 15 Min      29286 (CPT®) Hc Pt Iontophoresis Ea 15 Min      87369 (CPT®) Hc Pt Elec Stim Ea-Per 15 Min      96472 (CPT®) Hc Pt Ultrasound Ea 15 Min      37488 (CPT®) Hc Pt Self Care/Mgmt/Train Ea 15 Min      18188 (CPT®) Hc Pt Prosthetic (S) Train Initial Encounter, Each 15 Min      08622 (CPT®) Hc Pt Orthotic(S)/Prosthetic(S) Encounter, Each 15 Min      56764 (CPT®) Hc Orthotic(S) Mgmt/Train Initial Encounter, Each 15min      Total  15 1        Therapy Charges for Today     Code Description Service Date Service Provider Modifiers Qty    42241346330 HC PT RE-EVAL ESTABLISHED PLAN 2 1/8/2019 Yakelin Alberts, PT GP 1    95882063703 HC PT THERAPEUTIC ACT EA 15 MIN 1/8/2019 Yakelin Alberts, PT GP 1    88082827229 HC PT THER SUPP EA 15 MIN 1/8/2019 Yakelin Alberts, PT GP 2          PT G-Codes  Outcome Measure Options: AM-PAC 6 Clicks Basic Mobility (PT)  AM-PAC 6 Clicks Score: 10  Score: 8      Yakelin Alberts, PT  1/8/2019

## 2019-01-09 ENCOUNTER — APPOINTMENT (OUTPATIENT)
Dept: GENERAL RADIOLOGY | Facility: HOSPITAL | Age: 69
End: 2019-01-09

## 2019-01-09 LAB
ALBUMIN SERPL-MCNC: 2.73 G/DL (ref 3.2–4.8)
ALBUMIN/GLOB SERPL: 1.1 G/DL (ref 1.5–2.5)
ALP SERPL-CCNC: 188 U/L (ref 25–100)
ALT SERPL W P-5'-P-CCNC: 35 U/L (ref 7–40)
AMMONIA BLD-SCNC: 36 UMOL/L (ref 19–60)
ANION GAP SERPL CALCULATED.3IONS-SCNC: 7 MMOL/L (ref 3–11)
AST SERPL-CCNC: 46 U/L (ref 0–33)
BILIRUB SERPL-MCNC: 0.8 MG/DL (ref 0.3–1.2)
BUN BLD-MCNC: 25 MG/DL (ref 9–23)
BUN/CREAT SERPL: 27.2 (ref 7–25)
CA-I SERPL ISE-MCNC: 1.22 MMOL/L (ref 1.12–1.32)
CALCIUM SPEC-SCNC: 8 MG/DL (ref 8.7–10.4)
CHLORIDE SERPL-SCNC: 102 MMOL/L (ref 99–109)
CO2 SERPL-SCNC: 22 MMOL/L (ref 20–31)
CREAT BLD-MCNC: 0.92 MG/DL (ref 0.6–1.3)
GFR SERPL CREATININE-BSD FRML MDRD: 81 ML/MIN/1.73
GLOBULIN UR ELPH-MCNC: 2.5 GM/DL
GLUCOSE BLD-MCNC: 246 MG/DL (ref 70–100)
GLUCOSE BLDC GLUCOMTR-MCNC: 197 MG/DL (ref 70–130)
GLUCOSE BLDC GLUCOMTR-MCNC: 216 MG/DL (ref 70–130)
GLUCOSE BLDC GLUCOMTR-MCNC: 248 MG/DL (ref 70–130)
GLUCOSE BLDC GLUCOMTR-MCNC: 292 MG/DL (ref 70–130)
MAGNESIUM SERPL-MCNC: 1.9 MG/DL (ref 1.3–2.7)
PHOSPHATE SERPL-MCNC: 3.4 MG/DL (ref 2.4–5.1)
POTASSIUM BLD-SCNC: 3.7 MMOL/L (ref 3.5–5.5)
PROT SERPL-MCNC: 5.2 G/DL (ref 5.7–8.2)
SODIUM BLD-SCNC: 131 MMOL/L (ref 132–146)

## 2019-01-09 PROCEDURE — 80053 COMPREHEN METABOLIC PANEL: CPT

## 2019-01-09 PROCEDURE — 25010000002 CALCIUM GLUCONATE PER 10 ML

## 2019-01-09 PROCEDURE — 82330 ASSAY OF CALCIUM: CPT

## 2019-01-09 PROCEDURE — 94799 UNLISTED PULMONARY SVC/PX: CPT

## 2019-01-09 PROCEDURE — 25010000002 MAGNESIUM SULFATE PER 500 MG OF MAGNESIUM

## 2019-01-09 PROCEDURE — 25010000002 ENOXAPARIN PER 10 MG: Performed by: INTERNAL MEDICINE

## 2019-01-09 PROCEDURE — 25010000002 LINEZOLID 600 MG/300ML SOLUTION: Performed by: INTERNAL MEDICINE

## 2019-01-09 PROCEDURE — 36415 COLL VENOUS BLD VENIPUNCTURE: CPT | Performed by: INTERNAL MEDICINE

## 2019-01-09 PROCEDURE — 99233 SBSQ HOSP IP/OBS HIGH 50: CPT | Performed by: INTERNAL MEDICINE

## 2019-01-09 PROCEDURE — 83735 ASSAY OF MAGNESIUM: CPT

## 2019-01-09 PROCEDURE — 93010 ELECTROCARDIOGRAM REPORT: CPT | Performed by: INTERNAL MEDICINE

## 2019-01-09 PROCEDURE — 82962 GLUCOSE BLOOD TEST: CPT

## 2019-01-09 PROCEDURE — 25010000002 POTASSIUM CHLORIDE PER 2 MEQ OF POTASSIUM

## 2019-01-09 PROCEDURE — 25010000002 HYDROMORPHONE PER 4 MG: Performed by: INTERNAL MEDICINE

## 2019-01-09 PROCEDURE — 94760 N-INVAS EAR/PLS OXIMETRY 1: CPT

## 2019-01-09 PROCEDURE — 93005 ELECTROCARDIOGRAM TRACING: CPT | Performed by: NURSE PRACTITIONER

## 2019-01-09 PROCEDURE — 82140 ASSAY OF AMMONIA: CPT | Performed by: INTERNAL MEDICINE

## 2019-01-09 PROCEDURE — 84100 ASSAY OF PHOSPHORUS: CPT

## 2019-01-09 PROCEDURE — 97110 THERAPEUTIC EXERCISES: CPT

## 2019-01-09 PROCEDURE — 25010000002 MEROPENEM

## 2019-01-09 PROCEDURE — 71045 X-RAY EXAM CHEST 1 VIEW: CPT

## 2019-01-09 RX ADMIN — MAGNESIUM SULFATE HEPTAHYDRATE 4 G: 40 INJECTION, SOLUTION INTRAVENOUS at 10:22

## 2019-01-09 RX ADMIN — FLECAINIDE ACETATE 50 MG: 50 TABLET ORAL at 08:44

## 2019-01-09 RX ADMIN — METOPROLOL TARTRATE 25 MG: 25 TABLET ORAL at 20:29

## 2019-01-09 RX ADMIN — ENOXAPARIN SODIUM 90 MG: 100 INJECTION SUBCUTANEOUS at 20:28

## 2019-01-09 RX ADMIN — MEROPENEM 1 G: 1 INJECTION, POWDER, FOR SOLUTION INTRAVENOUS at 16:02

## 2019-01-09 RX ADMIN — INSULIN HUMAN 6 UNITS: 100 INJECTION, SOLUTION PARENTERAL at 12:18

## 2019-01-09 RX ADMIN — BUDESONIDE 0.5 MG: 0.5 INHALANT RESPIRATORY (INHALATION) at 07:49

## 2019-01-09 RX ADMIN — IPRATROPIUM BROMIDE AND ALBUTEROL SULFATE 3 ML: 2.5; .5 SOLUTION RESPIRATORY (INHALATION) at 20:02

## 2019-01-09 RX ADMIN — IPRATROPIUM BROMIDE AND ALBUTEROL SULFATE 3 ML: 2.5; .5 SOLUTION RESPIRATORY (INHALATION) at 16:39

## 2019-01-09 RX ADMIN — HYDROMORPHONE HYDROCHLORIDE 0.5 MG: 1 INJECTION, SOLUTION INTRAMUSCULAR; INTRAVENOUS; SUBCUTANEOUS at 16:02

## 2019-01-09 RX ADMIN — IPRATROPIUM BROMIDE AND ALBUTEROL SULFATE 3 ML: 2.5; .5 SOLUTION RESPIRATORY (INHALATION) at 07:49

## 2019-01-09 RX ADMIN — HYDROMORPHONE HYDROCHLORIDE 0.5 MG: 1 INJECTION, SOLUTION INTRAMUSCULAR; INTRAVENOUS; SUBCUTANEOUS at 00:41

## 2019-01-09 RX ADMIN — INSULIN HUMAN 4 UNITS: 100 INJECTION, SOLUTION PARENTERAL at 23:59

## 2019-01-09 RX ADMIN — METOPROLOL TARTRATE 25 MG: 25 TABLET ORAL at 10:22

## 2019-01-09 RX ADMIN — PANTOPRAZOLE SODIUM 40 MG: 40 INJECTION, POWDER, FOR SOLUTION INTRAVENOUS at 05:43

## 2019-01-09 RX ADMIN — HYDROMORPHONE HYDROCHLORIDE 0.5 MG: 1 INJECTION, SOLUTION INTRAMUSCULAR; INTRAVENOUS; SUBCUTANEOUS at 05:52

## 2019-01-09 RX ADMIN — HYDROMORPHONE HYDROCHLORIDE 0.5 MG: 1 INJECTION, SOLUTION INTRAMUSCULAR; INTRAVENOUS; SUBCUTANEOUS at 22:07

## 2019-01-09 RX ADMIN — FLECAINIDE ACETATE 50 MG: 50 TABLET ORAL at 20:29

## 2019-01-09 RX ADMIN — INSULIN HUMAN 2 UNITS: 100 INJECTION, SOLUTION PARENTERAL at 18:18

## 2019-01-09 RX ADMIN — MICAFUNGIN SODIUM 100 MG: 20 INJECTION, POWDER, LYOPHILIZED, FOR SOLUTION INTRAVENOUS at 20:49

## 2019-01-09 RX ADMIN — BUDESONIDE 0.5 MG: 0.5 INHALANT RESPIRATORY (INHALATION) at 20:02

## 2019-01-09 RX ADMIN — ENOXAPARIN SODIUM 90 MG: 100 INJECTION SUBCUTANEOUS at 08:44

## 2019-01-09 RX ADMIN — MEROPENEM 1 G: 1 INJECTION, POWDER, FOR SOLUTION INTRAVENOUS at 08:44

## 2019-01-09 RX ADMIN — LINEZOLID 600 MG: 600 INJECTION, SOLUTION INTRAVENOUS at 08:44

## 2019-01-09 RX ADMIN — CALCIUM GLUCONATE: 94 INJECTION, SOLUTION INTRAVENOUS at 18:17

## 2019-01-09 RX ADMIN — LINEZOLID 600 MG: 600 INJECTION, SOLUTION INTRAVENOUS at 20:28

## 2019-01-09 RX ADMIN — INSULIN HUMAN 4 UNITS: 100 INJECTION, SOLUTION PARENTERAL at 05:45

## 2019-01-09 RX ADMIN — SMOFLIPID 200 ML: 6; 6; 5; 3 INJECTION, EMULSION INTRAVENOUS at 18:17

## 2019-01-09 NOTE — PLAN OF CARE
Problem: Patient Care Overview  Goal: Plan of Care Review  Outcome: Ongoing (interventions implemented as appropriate)   01/09/19 1849   OTHER   Outcome Summary Patient remains very drowsy throughout entire shift. Discussed this with Dr. Guerra this evening at bedside. He stated that he was aware and would most likely order a follow up scan of the abdomen in the morning. OKAY with having Gtube to gravity if symptommatic. Hypertensive this shift, ordered metoprolol 25mg q12h. This has helped bring SBP <150. Otherwise, VSS. PRN Dilaudid for pain. Will continue to monitor.    Coping/Psychosocial   Plan of Care Reviewed With patient;spouse   Plan of Care Review   Progress no change       Problem: Fall Risk (Adult)  Goal: Absence of Fall  Outcome: Ongoing (interventions implemented as appropriate)   01/09/19 1849   Fall Risk (Adult)   Absence of Fall making progress toward outcome       Problem: Skin Injury Risk (Adult)  Goal: Skin Health and Integrity  Outcome: Ongoing (interventions implemented as appropriate)   01/09/19 1849   Skin Injury Risk (Adult)   Skin Health and Integrity making progress toward outcome       Problem: Pain, Acute (Adult)  Goal: Acceptable Pain Control/Comfort Level  Outcome: Ongoing (interventions implemented as appropriate)   01/09/19 1849   Pain, Acute (Adult)   Acceptable Pain Control/Comfort Level making progress toward outcome

## 2019-01-09 NOTE — PROGRESS NOTES
Adult Nutrition  Assessment/PES    Patient Name:  Todd Phillips  YOB: 1950  MRN: [id number]  Admit Date:  12/31/2018    Assessment Date:  1/9/2019    Comments:  Pt not taking clear liquid diet r/t lethargy; PN infusing at goal rate, insulin orders adjusted per intensivist; awaiting return of bowel function to initiate j-tube feedings.  RD will continue to monitor support and re-alimentation.    Reason for Assessment     Row Name 01/09/19 1336          Reason For Assessment  TF/PN;follow-up protocol;per organizational policy MDR; PN f/u 30 mins    Diagnosis  cancer diagnosis/related complications;gastrointestinal disease pancreatic cancer, s/p Whipple procedure 12/31, wedge bx of liver tumor, portal vein resection/repair and G/J tube placed. ; SOA, ileus, pneumatosis, portal vein gas    Identified At Risk by Screening Criteria  tube feeding or parenteral nutrition        Nutrition/Diet History     Row Name 01/09/19 1336          Nutrition/Diet History    Typical Food/Fluid Intake  RN reports pt not taking any  po ; pt lethargic today. PEG ordered clamped, no bowel fxn per surgeon.      Factors Affecting Nutritional Intake  altered gastrointestinal function           Labs/Tests/Procedures/Meds     Row Name 01/09/19 1339          Labs/Procedures/Meds    Lab Results Reviewed  reviewed, pertinent     Lab Results Comments  elev glucose        Medications    Pertinent Medications Reviewed  reviewed, pertinent     Pertinent Medications Comments  regular insulin adjusted         Physical Findings     Row Name 01/09/19 1340          Physical Findings    Overall Physical Appearance  -- asleep     Gastrointestinal  feeding tube     Tubes  -- PEG/J             Evaluation of Received Nutrient/Fluid Intake     Row Name 01/09/19 1341          Nutrient/Fluid Evaluation    Number of Days Evaluated  1 day        Calories Evaluation    Parenteral Calories (kcal)  2142     Total Calories (kcal)  2142     % of Kcal Needs   97        Protein Evaluation    Parenteral Protein (gm)  126     Total Protein (gm)  126     % of Protein Needs  92        Intake Assessment    Energy/Calorie Requirement Assessment  meeting needs     Protein Requirement Assessment  not meeting needs     Tolerance  tolerating        Recommended Daily Intake Evaluation    RDI  Met        PN Evaluation    Number of Days PN Evaluated  1 day     PN Average delivery (mL/day)   1809 mL/day     PN Average lipid delivery (mL/day)   204 mL/day               Problem/Interventions:    Problem 2     Row Name 01/09/19 1344          Nutrition Diagnoses Problem 2    Problem 2  No Nutrition Diagnosis at this Time     Inadequate Intake Type  Parenteral     Etiology (related to)  MNT for Treatment/Condition     Signs/Symptoms (evidenced by)  PN Intake Delivery     Percent (%) of  PN goal  100 %               Intervention Goal     Row Name 01/09/19 1345          Intervention Goal    General  Meet nutritional needs for age/condition;Nutrition support treatment     TF/PN  Maintain TF/PN         Nutrition Intervention     Row Name 01/09/19 1345          Nutrition Intervention    RD/Tech Action  Await begin PO;Follow Tx progress;Care plan reviewd;Interview for preference;Advise alternate selection         Nutrition Prescription     Row Name 01/09/19 1345          Nutrition Prescription PN    Parenteral Prescription  Continue same protocal         Education/Evaluation     Row Name 01/09/19 1345          Monitor/Evaluation    Monitor  Per protocol;Pertinent labs;PN delivery/tolerance;Symptoms           Electronically signed by:  Aster Coleman MS,RD,LD  01/09/19 1:46 PM

## 2019-01-09 NOTE — PROGRESS NOTES
Pharmacy Parenteral Nutrition Evaluation  Todd Phillips is a  68 y.o. male receiving TPN.     Indication: Necrotizing enterocolitis  Consulting Physician:  Dr Brody    Labs  Results from last 7 days   Lab Units  01/09/19 0417 01/08/19 2028 01/08/19 0311 01/07/19   0750   SODIUM mmol/L  131*   --   131*  133   POTASSIUM mmol/L  3.7  3.4*  3.3*  3.7   CHLORIDE mmol/L  102   --   101  104   CO2 mmol/L  22.0   --   23.0  21.0   BUN mg/dL  25*   --   28*  33*   CREATININE mg/dL  0.92   --   0.88  0.93   CALCIUM mg/dL  8.0*   --   8.1*  7.7*   BILIRUBIN mg/dL  0.8   --   0.8  0.8   ALK PHOS U/L  188*   --   181*  117*   ALT (SGPT) U/L  35   --   28  24   AST (SGOT) U/L  46*   --   43*  41*   GLUCOSE mg/dL  246*   --   230*  202*     Results from last 7 days   Lab Units  01/09/19 0417 01/08/19 0311 01/07/19 0750 01/06/19   1406   MAGNESIUM mg/dL  1.9  1.8  1.9  1.9   --    PHOSPHORUS mg/dL  3.4  3.0  2.4  2.4   --    PREALBUMIN mg/dL   --    --    --   <5.0*     Results from last 7 days   Lab Units  01/08/19 0311 01/07/19 0750 01/06/19   0506   WBC 10*3/mm3  20.45*  23.91*  18.36*   HEMOGLOBIN g/dL  7.9*  8.7*  9.0*   HEMATOCRIT %  23.4*  25.8*  27.1*   PLATELETS 10*3/mm3  297  297  279     Triglycerides   Date Value Ref Range Status   01/07/2019 133 0 - 150 mg/dL Final     estimated creatinine clearance is 96.7 mL/min (by C-G formula based on SCr of 0.92 mg/dL).    Intake & Output (last 3 days)       01/06 0701 - 01/07 0700 01/07 0701 - 01/08 0700 01/08 0701 - 01/09 0700 01/09 0701 - 01/10 0700    P.O.        I.V. (mL/kg) 2336.2 (25.5) 1030 (11.3) 627 (6.9)     Other   255 90    NG/GT  90      IV Piggyback 1051 457.6 900     .2 1878.6 2013     Total Intake(mL/kg) 3963.4 (43.3) 3456.2 (37.8) 3795 (41.5) 90 (1)    Urine (mL/kg/hr) 1365 (0.6) 1475 (0.7) 1525 (0.7) 150 (0.3)    Emesis/NG output 400 700 275     Drains 200       Stool  0 0     Wound  0      Total Output 1965 2175 1800 150     Net +1998.4 +1281.2 +1995 -60            Stool Unmeasured Occurrence  1 x 4 x           Dietitian Recommendations     Current TPN Regimen Recommendation:  Dextrose 20% / Amino Acid 7% at goal rate of 75mL/hr.  20% SMOF Lipid Emulsion 200mL every 24 hours.    Assessment/Plan:  1. Continuing TPN for necrotizing enterocolitis. Macronutrients per dietary recommendation as listed above. Goal rate of 75mL/hr.  2. Will continue with standard electrolytes and 1:1 acid base. Electrolyte replacements and sliding scale insulin are available. Mag replaced this AM. Insulin increased as well. Continue to monitor blood glucose.   3. Pharmacy will continue to follow and adjust as indicated.     Thanks,   Dhara Brooks, PharmD  Pharmacy Resident  1/9/2019  1:17 PM

## 2019-01-09 NOTE — PROGRESS NOTES
[facility] Progress Note        Antibiotics:  Anti-Infectives (From admission, onward)    Ordered     Dose/Rate Route Frequency Start Stop    01/07/19 0942  Linezolid (ZYVOX) 600 mg 300 mL     Ordering Provider:  Scot Higgins MD    600 mg  300 mL/hr over 60 Minutes Intravenous Every 12 Hours Scheduled 01/07/19 1100 01/17/19 0859    01/06/19 1306  meropenem (MERREM) 1 g/100 mL 0.9% NS VTB (mbp)     Ordering Provider:  Aletha Alvarez, RPH    1 g  over 3 Hours Intravenous Every 8 Hours 01/06/19 1600 01/12/19 2359    01/05/19 1820  micafungin 100 mg/100 mL 0.9% NS IVPB (mbp)     Ordering Provider:  Fermin Cabrera MD    100 mg  over 60 Minutes Intravenous Every 24 Hours 01/05/19 2000 01/12/19 1959 01/05/19 1859  meropenem (MERREM) 1 g/100 mL 0.9% NS VTB (mbp)     Ordering Provider:  Levi Bustos RPH    1 g  over 30 Minutes Intravenous Once 01/05/19 1945 01/05/19 2039 01/05/19 1859  vancomycin 2250 mg/500 mL 0.9% NS IVPB ([facility])     Ordering Provider:  Levi Bustos RPH    2,250 mg Intravenous Once 01/05/19 1945 01/05/19 2009 01/01/19 0936  piperacillin-tazobactam (ZOSYN) 3.375 g in iso-osmotic dextrose 50 ml (premix)     Ordering Provider:  Miquel Brody MD    3.375 g  over 30 Minutes Intravenous Once 01/01/19 1030 01/01/19 1209    12/31/18 0632  ceFAZolin in dextrose (ANCEF) IVPB solution 2 g     Ordering Provider:  Miquel Brody MD    2 g  over 30 Minutes Intravenous Once 12/31/18 0634 12/31/18 0800    12/31/18 0632  metroNIDAZOLE (FLAGYL) IVPB 500 mg     Ordering Provider:  Miquel Brody MD    500 mg  over 60 Minutes Intravenous Once 12/31/18 0634 12/31/18 0830          CC: fatigue, abd pain    HPI:    Patient is a 68 y.o.  Yr old male with history of biliary tract obstruction, admitted to [facility] multiple times in the last 2 months including August 10 until August 16, underwent cholecystectomy with pathology suggesting chronic cholecystitis,  had ERCP on August 14 with biliary stricture/proximal duct dilation and had stent placement.  He was readmitted August 21, 2018 until August 25, 2018 with acute sepsis, Klebsiella bacteremia, discharged with oral antibiotics.  Readmitted September 12, 2018 until September 17, 2018 with ESBL Escherichia coli bacteremia seen by infectious disease in [place] and treated with Invanz, duration of therapy unclear but approximately until September 24.  During that period of time, concern for malignancy mentioned in notes and referred to [facility] for further biopsy, done September 28.  Presented once again to [facility] emergency room September 30, 2018 with acute onset abdominal pain/nausea/vomiting.  Blood cultures positive again with  Klebsiella species and  Transferred to [facility]. 10/1/18 ERCP with evidence for obstruction/purulence and new stent placed; He was treated with rocephin/flagyl and last seen by us early October; after that, he had more definitive diagnosis of pancreatic cancer and treated with neoadjuvant chemotherapy and right chest port placement.  In addition he required TPN.  Family not aware of any other specific microbiologic diagnosis since October; he was admitted December 31 and underwent pancreaticoduodenectomy, wedge biopsy of liver tumor and portal vein resection/lateral repair with gastrojejunostomy tube placement.  Moved to ICU on January 5 with concern regarding increased confusion/Tachypnea, abnormal CT scan with pneumatosis/ileus and portal venous gas in addition to intra-abdominal fluid per description of radiology.  Chest x-ray January 6 with low lung volumes and increased markings at lung bases bilaterally but no focal parenchymal opacification per radiology.  Head CT no acute intracranial abnormality.     1/9/19 Patient  sleepy/Confused and does not cooperate with a history or review of systems.  He answers yes/no to some simple questions but  "unclear reliability.  Nursing reports left arm PICC line placed January 6 and remains stable, chronic right port in the chest with no new redness/swelling or change there.  Indwelling Hassan catheter, G/J-tube, and ostomy bag over prior MADDY drain site; MADDY drain removed January 6.  No stool output since admission per nursing.  No rash.  On nasal cannula oxygen with no productive cough. LG fever at 100.6 last 24 hours     Patient does not cooperate with ROS otherwise      ROS:        1/9/19 as above, patient confused and not cooperative with review of systems          PE:   BP (!) 167/104   Pulse 93   Temp 99.7 °F (37.6 °C) (Core)   Resp 20   Ht 182.9 cm (72\")   Wt 91.5 kg (201 lb 12.8 oz)   SpO2 98%   BMI 27.37 kg/m²       GENERAL: He opens his eyes, follows simple commands but not oriented.  Nasal cannula oxygen  HEENT: Normocephalic, atraumatic.  PERRL. EOMI. No conjunctival injection. No icterus. Oropharynx clear without evidence of thrush or exudate. No evidence of peridontal disease.    NECK: Supple without nuchal rigidity. No mass.  LYMPH: No cervical, axillary or inguinal lymphadenopathy.  HEART: RRR; No murmur, rubs, gallops.   LUNGS: Diminished at bases bilaterally without wheezing, rales, rhonchi. Normal respiratory effort. Nonlabored. No dullness.  ABDOMEN: Soft, tender and distended. Positive bowel sounds managed. No rebound or guarding. NO mass or HSM.  Ostomy bag noted at right abdomen but no active drainage or fluid in bag.  Surgical site noted with no redness induration or warmth.  No mass bulge or fluctuance.  No crepitus or bulla.  Feeding tube site noted with no redness induration or warmth.  No mass bulge or fluctuance appreciated.  No crepitus or bulla.  No purulence or skin necrosis  EXT:  No cyanosis, clubbing. No cord.  : Genitalia generally unremarkable.  With Hassan catheter.  MSK: FROM without joint effusions noted arms/legs.    SKIN: Warm and dry without cutaneous eruptions on " Inspection/palpation.    NEURO: He moves all 4 extremities spontaneously but he is not cooperate with a detailed motor or sensory exam     Right chest port with no redness induration or warmth.  No crepitus or bulla.  No purulence     Left arm PICC line with no redness or purulence     No peripheral stigmata/phenomena of endocarditis        Laboratory Data    Results from last 7 days   Lab Units  01/08/19   0311  01/07/19   0750  01/06/19   0506   WBC 10*3/mm3  20.45*  23.91*  18.36*   HEMOGLOBIN g/dL  7.9*  8.7*  9.0*   HEMATOCRIT %  23.4*  25.8*  27.1*   PLATELETS 10*3/mm3  297  297  279     Results from last 7 days   Lab Units  01/09/19   0417   SODIUM mmol/L  131*   POTASSIUM mmol/L  3.7   CHLORIDE mmol/L  102   CO2 mmol/L  22.0   BUN mg/dL  25*   CREATININE mg/dL  0.92   GLUCOSE mg/dL  246*   CALCIUM mg/dL  8.0*     Results from last 7 days   Lab Units  01/09/19   0417   ALK PHOS U/L  188*   BILIRUBIN mg/dL  0.8   ALT (SGPT) U/L  35   AST (SGOT) U/L  46*         Results from last 7 days   Lab Units  01/06/19   1406   CRP mg/dL  20.71*       Estimated Creatinine Clearance: 96.7 mL/min (by C-G formula based on SCr of 0.92 mg/dL).      Microbiology:      Radiology:  Imaging Results (last 72 hours)     Procedure Component Value Units Date/Time    XR Chest 1 View [[id number]] Collected:  01/06/19 0915     Updated:  01/07/19 1411    Narrative:          EXAMINATION: XR CHEST 1 VW - 1/6/2019     INDICATION: Z01.89-Encounter for other specified special examinations;  C25.9-Malignant neoplasm of pancreas, unspecified; Z74.09-Other reduced  mobility      COMPARISON: 01/05/2019     FINDINGS: Portable chest reveals low lung volumes. Deep line catheter on  the right with tip in the SVC. Mild increased markings at lung bases  bilaterally with degenerative changes seen within the spine.  No focal  parenchymal opacification present.       Impression:       Low lung volumes with increased markings at the lung  bases  bilaterally.     DICTATED:   1/6/2019  EDITED/ls :   1/6/2019      This report was finalized on 1/7/2019 2:09 PM by Dr. Tasha Guzmán MD.       CT Head Without Contrast [[id number]] Collected:  01/06/19 1803     Updated:  01/07/19 1410    Narrative:       EXAMINATION: CT HEAD WO CONTRAST - 1/6/2019     INDICATION:  Z01.89-Encounter for other specified special examinations;  C25.9-Malignant neoplasm of pancreas, unspecified; Z74.09-Other reduced  mobility. Mental status change.     TECHNIQUE: Multiple axial CT imaging is obtained of the head without the  administration of intravenous contrast.     The radiation dose reduction device was turned on for each scan per the  ALARA (As Low as Reasonably Achievable) protocol.     COMPARISON: 11/17/2018     FINDINGS: Atrophy of the brain with low-density area seen in the  periventricular and subcortical white matter suggesting chronic small  vessel ischemic change. No hemorrhage or hydrocephalus. No mass, mass  effect, or midline shift. No abnormal extra-axial fluid collections  identified. The bony structures reveal no evidence of osseous  abnormality. The visualized paranasal sinuses are clear. The mastoid air  cells are patent.       Impression:       Chronic changes seen within the brain with no acute  intracranial abnormality identified.     DICTATED:   1/6/2019  EDITED/ls :   1/6/2019         This report was finalized on 1/7/2019 2:08 PM by Dr. Tasha Guzmán MD.       XR Chest 1 View [[id number]] Collected:  01/05/19 1928     Updated:  01/06/19 1008    Narrative:          EXAMINATION: XR CHEST 1 VW - 1/5/2019     INDICATION:  Z01.89-Encounter for other specified special examinations;  C25.9-Malignant neoplasm of pancreas, unspecified; Z74.09-Other reduced  mobility      COMPARISON: 01/05/2019     FINDINGS: Portable chest reveals deep line catheter on the right with  tip in the SVC. Mild increased markings at left lung base with small  left pleural  effusion. Improvement seen in aeration of the lung bases in  the interval. Degenerative change is seen within the spine. Pulmonary  vascularity is within normal limits.           Impression:       Improvement seen in aeration of the lung bases in the  interval with only minimal right residual markings at the lung bases.     DICTATED:   1/5/2019  EDITED/ls :   1/5/2019      This report was finalized on 1/6/2019 10:06 AM by Dr. Tasha Guzmán MD.       CT Abdomen Pelvis Without Contrast [[id number]] Collected:  01/05/19 1645     Updated:  01/05/19 1655    Narrative:       EXAMINATION: CT ABDOMEN/PELVIS WO CONTRAST - 1/5/2019      INDICATION:  Z01.89-Encounter for other specified special examinations;  C25.9-Malignant neoplasm of pancreas, unspecified; Z74.09-Other reduced  mobility. Abdominal pain.     TECHNIQUE: Multiple axial CT imaging is obtained of the abdomen and  pelvis without the administration of oral or intravenous contrast.     The radiation dose reduction device was turned on for each scan per the  ALARA (As Low as Reasonably Achievable) protocol.     COMPARISON: NONE     FINDINGS: There are small bilateral pleural effusions. Atelectatic  change is seen in the lung bases bilaterally. Extraluminal air is  identified throughout the upper abdomen. There is fluid seen surrounding  the spleen as well as the liver. There is a small to  moderate size  hiatal hernia. There is gastric distention. There are postoperative  changes seen from Whipple procedure. There is a small collection of  fluid identified along the leftward aspect of the liver. There is some  free fluid identified near the sarbjit hepatis. There is a G-tube  identified in satisfactory position. Kidneys and adrenal glands within  normal limits. There is gaseous distention of the small bowel loops  suggesting a postoperative ileus. There is air identified within the  mesenteric vessels. Findings are concerning for pneumatosis of several  small  bowel loops centrally within the abdomen. There is fluid seen  scattered throughout the colon. Mild distention of the colon. There is  some fluid seen within the pelvis.     Pelvis: The pelvic portion of the gastrointestinal tract is within  normal limits. Fluid seen within the colon. The bladder reveals  air-fluid level likely from prior instrumentation. No pelvic adenopathy.  Bony structures reveal no evidence of osseous abnormality.       Impression:       There are small bilateral pleural effusions. There is a  fluid collection seen surrounding the liver and spleen containing air in  the fluid collection around the liver. There is air seen scattered  throughout the abdomen which may be postoperative. There is air  identified within the mesenteric vessels with findings suggesting  pneumatosis throughout scattered small bowel loops within the mid  abdomen. Fluid throughout the colon suggesting clinical presentation of  diarrhea. Gastrostomy tube is in satisfactory position.     DICTATED:   1/5/2019  EDITED/ls :   1/5/2019      This report was finalized on 1/5/2019 4:53 PM by Dr. Tasha Guzmán MD.               Impression:     --Acute postoperative leukocytosis and low-grade fever, approx , at risk for infection multiple sites including abdomen, lung, urine, IV lines, etc.  IV sites look benign at present and Blood cultures negative so far.  Urinalysis with negative leuk esterase/nitrites and UTI generally less likely at present.  Low lung volumes on chest x-ray not unexpected after abdominal surgery, currently no cough and while he is at risk for pneumonia, unable to confirm at present.  Abdomen remains primary concern with enterocolitis, postoperative fluid and on empiric antibiotics with these concerns in mind with Zyvox/Merrem and micafungin.  At risk for MDR organisms with prior history ESBL and multiple hospitalizations, etc.     --Acute postoperative enterocolitis.  Recent surgery as outlined  above in the setting of pancreatic cancer, prior neoadjuvant chemotherapy and tube feeding.  Tube feedings stopped and TPN restarted.  Dr. Brody following closely to determine timing/option/threshold for further surgical intervention or serial imaging/percutaneous drainage etc.  I d/w Dr Brody     --Pancreatic cancer with prior neoadjuvant chemotherapy, indwelling chronic port and recent surgery as outlined above.     --COPD     --Diabetes type 2     --Chronic anticoagulation with history A. fib and past antiarrhythmic therapy     --Hx ESBL    PLAN:      --IV Zyvox/Merrem and Mycamine     --Check/review labs cultures and scans     --Discussed with family, history per them     --History per nursing as well     --Discussed with microbiology     --Highly complex set of issues with high risk for further serious morbidity and other serious sequela    --d/w Dr Brody     --If not steadily improving, you should give consideration to serial imaging and consideration for further surgical intervention versus interventional radiology options, etc.           Scot Higgins MD  1/9/2019

## 2019-01-09 NOTE — THERAPY TREATMENT NOTE
Acute Care - Occupational Therapy Treatment Note  [facility]     Patient Name: Todd Phillips  : 1950  MRN: [id number]  Today's Date: 2019  Onset of Illness/Injury or Date of Surgery: 18  Date of Referral to OT: 19  Referring Physician: MD Mayuri    Admit Date: 2018       ICD-10-CM ICD-9-CM   1. Impaired functional mobility, balance, gait, and endurance Z74.09 V49.89   2. Laboratory test Z01.89 V72.60   3. Pancreatic cancer (CMS/HCC) C25.9 157.9   4. 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)   • DM2 (diabetes mellitus, type 2) (CMS/HCC)   • Paroxysmal atrial fibrillation   • Chronic anticoagulation, Eliquis   • Encounter for monitoring flecainide therapy   • Malignant neoplasm of head of pancreas (CMS/HCC)   • Bacteremia due to Escherichia coli   • Biliary obstruction   • Thrombocytopenia (CMS/HCC)   • Anemia due to chemotherapy   • Fever   • Post-operative confusion and somnolence, likely due to oversedation   • Atrial fibrillation with RVR (CMS/HCC)   • Adult necrotizing enterocolitis (CMS/HCC)   • Encephalopathy     Past Medical History:   Diagnosis Date   • Antral gastritis    • Asthma    • Atrial fibrillation (CMS/HCC)     treated with oral blood thinner   • Chest pain    • COPD (chronic obstructive pulmonary disease) (CMS/HCC)    • Coronary artery disease     no stents   • Diabetes mellitus (CMS/HCC)     type 2 - checks sugar 4 times per day    • Duodenitis    • Elevated cholesterol    • Emphysema of lung (CMS/HCC)    • Gallbladder disease     removed    • GERD (gastroesophageal reflux disease)    • Hard to  intubate     busted lip last time   • Hyperlipidemia    • Hypertension    • Jaundice    • Kidney stone    • Kidney stones     had lithotripsy and passed 36 kidney stones as well as had one surgically removed.   • Malignant neoplasm of head of pancreas (CMS/HCC) 9/5/2018   • Nausea    • Obstructive sleep apnea on CPAP     compliant with machine    • Palpitations    • Pneumonia 08/2018   • Reflux esophagitis    • Renal failure     stage 3 kidney disease   • Sepsis (CMS/HCC)      Past Surgical History:   Procedure Laterality Date   • BILE DUCT STENT PLACEMENT      [facility] 2 weeks ago    • CARDIAC CATHETERIZATION  11/01/1999   • CARDIOVASCULAR STRESS TEST  09/2012   • CHOLECYSTECTOMY N/A 8/10/2018    Procedure: CHOLECYSTECTOMY LAPAROSCOPIC;  Surgeon: Ronak Downs MD;  Location: [facility] OR;  Service: General   • COLONOSCOPY     • CYSTOSCOPY BLADDER STONE LITHOTRIPSY     • ECHO - CONVERTED  09/2012   • ERCP N/A 8/14/2018    Procedure: ENDOSCOPIC RETROGRADE CHOLANGIOPANCREATOGRAPHY WITH PAPILLOTOMY;  Surgeon: Scot Su MD;  Location:  [facility] OR;  Service: Gastroenterology   • ERCP N/A 10/1/2018    Procedure: ENDOSCOPIC RETROGRADE CHOLANGIOPANCREATOGRAPHY;  Surgeon: Jefry Luna MD;  Location:  [facility];  Service: Gastroenterology   • KIDNEY STONE SURGERY     • KIDNEY STONE SURGERY      open abdominal surgery   • PORTACATH PLACEMENT Right 10/23/2018    Procedure: INSERTION OF PORTACATH;  Surgeon: Ronak Downs MD;  Location:  [facility] OR;  Service: General   • TONSILLECTOMY     • UPPER GASTROINTESTINAL ENDOSCOPY  08/30/2012   • WHIPPLE PROCEDURE N/A 12/31/2018    Procedure: WHIPPLE PROCEDURE, BIOPSY OF LIVER, PORTAL VEIN RESECTION AND REPAIR, G/J TUBE PLACEMENT;  Surgeon: Miquel Brody MD;  Location:  [facility] OR;  Service: General       Therapy Treatment    Rehabilitation Treatment Summary     Row Name 01/09/19 1417             Treatment Time/Intention     Discipline  occupational therapist  -CL      Document Type  therapy note (daily note)  -CL      Subjective Information  complains of;pain;fatigue  -CL      Patient Effort  poor  -CL      Existing Precautions/Restrictions  fall;oxygen therapy device and L/min;other (see comments) abdominal incision, G/J tube, colostomy  -CL      Treatment Considerations/Comments  pt w/ significant lethargy and poor command following, deferred any EOB or OOB activity.   -CL      Recorded by [CL] Martha Blackwood OT 01/09/19 1546      Row Name 01/09/19 1417             Vital Signs    Pre Systolic BP Rehab  -- VSS, RN cleared for tx.   -CL      Recorded by [CL] Martha Blackwood OT 01/09/19 1546      Row Name 01/09/19 1417             Cognitive Assessment/Intervention- PT/OT    Affect/Mental Status (Cognitive)  low arousal/lethargic;confused  -CL      Orientation Status (Cognition)  oriented to;person;place;disoriented to;time  -CL      Follows Commands (Cognition)  follows one step commands;0-24% accuracy;verbal cues/prompting required;repetition of directions required  -CL      Cognitive Function (Cognitive)  safety deficit  -CL      Safety Deficit (Cognitive)  severe deficit;awareness of need for assistance;ability to follow commands;insight into deficits/self awareness;safety precautions awareness  -CL      Personal Safety Interventions  fall prevention program maintained;gait belt;nonskid shoes/slippers when out of bed  -CL      Recorded by [CL] Martha Blackwood OT 01/09/19 1546      Row Name 01/09/19 1417             ADL Assessment/Intervention    BADL Assessment/Intervention  grooming  -CL      Recorded by [CL] Martha Blackwood OT 01/09/19 1546      Row Name 01/09/19 1417             Grooming Assessment/Training    [place] Level (Grooming)  wash face, hands;dependent (less than 25% patient effort)  -CL      Grooming Position  supine  -CL      Recorded by [CL] Martha Blackwood OT 01/09/19 1546      Row Name 01/09/19 1417              Therapeutic Exercise    Therapeutic Exercise  seated, upper extremities HOB elevated  -CL      04447 - OT Therapeutic Exercise Minutes  15  -CL      Recorded by [CL] Martha Blackwood, LORENA 01/09/19 1546      Row Name 01/09/19 1417             Upper Extremity Seated Therapeutic Exercise    Performed, Seated Upper Extremity (Therapeutic Exercise)  shoulder flexion/extension;elbow flexion/extension;forearm supination/pronation;wrist flexion/extension;digit flexion/extension  -CL      Exercise Type, Seated Upper Extremity (Therapeutic Exercise)  PROM (passive range of motion);AAROM (active assistive range of motion)  -CL      Sets/Reps Detail, Seated Upper Extremity (Therapeutic Exercise)  1/15  -CL      Comment, Seated Upper Extremity (Therapeutic Exercise)  BUE, family educated on PROM and given handout  -CL      Recorded by [CL] Martha Blackwood OT 01/09/19 1546      Row Name 01/09/19 1417             Positioning and Restraints    Pre-Treatment Position  in bed  -CL      Post Treatment Position  bed  -CL      In Bed  notified nsg;fowlers;call light within reach;encouraged to call for assist;exit alarm on;with family/caregiver;legs elevated;heels elevated  -CL      Recorded by [CL] Martha Blackwood, LORENA 01/09/19 1546      Row Name 01/09/19 1417             Pain Scale 2: FACES Pre/Post-Treatment    Pain 2: FACES Scale, Pretreatment  2-->hurts little bit  -CL      Pain 2: FACES Scale, Post-Treatment  2-->hurts little bit  -CL      Pain Location 2  abdomen  -CL      Pre/Post Treatment Pain 2 Comment  Tolerated.   -CL      Pain Intervention(s) 2  Repositioned;Ambulation/increased activity  -CL      Recorded by [CL] Martha Blackwood, LORENA 01/09/19 1546        User Key  (r) = Recorded By, (t) = Taken By, (c) = Cosigned By    Initials Name Effective Dates Discipline    CL Martha Blackwood, OT 04/03/18 -  OT        Wound upper;midline abdomen incision (Active)   Dressing Appearance open to air 1/9/2019  2:00 PM   Closure Staples 1/9/2019  2:00 PM    Base clean;dry 1/9/2019  2:00 PM   Periwound dry;pink 1/9/2019  2:00 PM   Periwound Temperature warm 1/9/2019  2:00 PM   Periwound Skin Turgor firm 1/9/2019  2:00 PM   Drainage Amount none 1/9/2019  2:00 PM   Dressing Care, Wound open to air 1/9/2019  2:00 PM       Occupational Therapy Education     Title: PT OT SLP Therapies (In Progress)     Topic: Occupational Therapy (In Progress)     Point: ADL training (In Progress)     Description: Instruct learner(s) on proper safety adaptation and remediation techniques during self care or transfers.   Instruct in proper use of assistive devices.    Learning Progress Summary           Patient Acceptance, E,H, NR by CL at 1/9/2019  3:46 PM    Comment:  Pt educated on appropriate safety precautions, PROM HEP, positioning, and appropriate progression of therapy based on level of performance.    Acceptance, E, NR by CL at 1/7/2019  3:58 PM    Comment:  Pt educated on appropriate safety precautions, t/f techniques, role of OT, positioning, and benefits of therapy.    Acceptance, E,D,TB, VU,NR by TB at 1/4/2019  3:55 PM    Comment:  Education initiated for benefits of OOB activity/therapy, role of OT, transfer training, HEP and recommendation for rehab at d/c   Family Acceptance, E,H, NR by CL at 1/9/2019  3:46 PM    Comment:  Pt educated on appropriate safety precautions, PROM HEP, positioning, and appropriate progression of therapy based on level of performance.    Acceptance, E, NR by CL at 1/7/2019  3:58 PM    Comment:  Pt educated on appropriate safety precautions, t/f techniques, role of OT, positioning, and benefits of therapy.    Acceptance, E,D,TB, VU,NR by TB at 1/4/2019  3:55 PM    Comment:  Education initiated for benefits of OOB activity/therapy, role of OT, transfer training, HEP and recommendation for rehab at d/c                   Point: Home exercise program (In Progress)     Description: Instruct learner(s) on appropriate technique for monitoring, assisting  and/or progressing therapeutic exercises/activities.    Learning Progress Summary           Patient Acceptance, E,H, NR by CL at 1/9/2019  3:46 PM    Comment:  Pt educated on appropriate safety precautions, PROM HEP, positioning, and appropriate progression of therapy based on level of performance.    Acceptance, E,D,TB, VU,NR by TB at 1/4/2019  3:55 PM    Comment:  Education initiated for benefits of OOB activity/therapy, role of OT, transfer training, HEP and recommendation for rehab at d/c   Family Acceptance, E,H, NR by CL at 1/9/2019  3:46 PM    Comment:  Pt educated on appropriate safety precautions, PROM HEP, positioning, and appropriate progression of therapy based on level of performance.    Acceptance, E,D,TB, VU,NR by TB at 1/4/2019  3:55 PM    Comment:  Education initiated for benefits of OOB activity/therapy, role of OT, transfer training, HEP and recommendation for rehab at d/c                   Point: Precautions (In Progress)     Description: Instruct learner(s) on prescribed precautions during self-care and functional transfers.    Learning Progress Summary           Patient Acceptance, E,H, NR by CL at 1/9/2019  3:46 PM    Comment:  Pt educated on appropriate safety precautions, PROM HEP, positioning, and appropriate progression of therapy based on level of performance.    Acceptance, E, NR by CL at 1/7/2019  3:58 PM    Comment:  Pt educated on appropriate safety precautions, t/f techniques, role of OT, positioning, and benefits of therapy.   Family Acceptance, E,H, NR by CL at 1/9/2019  3:46 PM    Comment:  Pt educated on appropriate safety precautions, PROM HEP, positioning, and appropriate progression of therapy based on level of performance.    Acceptance, E, NR by CL at 1/7/2019  3:58 PM    Comment:  Pt educated on appropriate safety precautions, t/f techniques, role of OT, positioning, and benefits of therapy.                   Point: Body mechanics (In Progress)     Description: Instruct  learner(s) on proper positioning and spine alignment during self-care, functional mobility activities and/or exercises.    Learning Progress Summary           Patient Acceptance, E, NR by CL at 1/7/2019  3:58 PM    Comment:  Pt educated on appropriate safety precautions, t/f techniques, role of OT, positioning, and benefits of therapy.   Family Acceptance, E, NR by CL at 1/7/2019  3:58 PM    Comment:  Pt educated on appropriate safety precautions, t/f techniques, role of OT, positioning, and benefits of therapy.                               User Key     Initials Effective Dates Name Provider Type Discipline    TB 06/08/18 -  Corina Nelson, OT Occupational Therapist OT    CL 04/03/18 -  Martha Blackwood, OT Occupational Therapist OT                OT Recommendation and Plan  Outcome Summary/Treatment Plan (OT)  Anticipated Equipment Needs at Discharge (OT): (TBA further)  Anticipated Discharge Disposition (OT): skilled nursing facility  Therapy Frequency (OT Eval): daily  Plan of Care Review  Plan of Care Reviewed With: patient, spouse  Plan of Care Reviewed With: patient, spouse  Outcome Summary: Pt session limited d/t lethargy and poor command following, deferred any EOB or OOB activity. Pt's spouse educated on PROM HEP and given handout. Recommend cont skilled IPOT POC.   Outcome Measures     Row Name 01/09/19 1417 01/08/19 0818 01/07/19 1339       How much help from another person do you currently need...    Turning from your back to your side while in flat bed without using bedrails?  --  2  -LS  --    Moving from lying on back to sitting on the side of a flat bed without bedrails?  --  2  -LS  --    Moving to and from a bed to a chair (including a wheelchair)?  --  2  -LS  --    Standing up from a chair using your arms (e.g., wheelchair, bedside chair)?  --  2  -LS  --    Climbing 3-5 steps with a railing?  --  1  -LS  --    To walk in hospital room?  --  1  -LS  --    AM-PAC 6 Clicks Score  --  10  -LS   --       How much help from another is currently needed...    Putting on and taking off regular lower body clothing?  1  -CL  --  1  -CL    Bathing (including washing, rinsing, and drying)  1  -CL  --  1  -CL    Toileting (which includes using toilet bed pan or urinal)  1  -CL  --  1  -CL    Putting on and taking off regular upper body clothing  1  -CL  --  2  -CL    Taking care of personal grooming (such as brushing teeth)  1  -CL  --  2  -CL    Eating meals  1  -CL  --  1  -CL    Score  6  -CL  --  8  -CL       Functional Assessment    Outcome Measure Options  AM-PAC 6 Clicks Daily Activity (OT)  -CL  AM-PAC 6 Clicks Basic Mobility (PT)  -LS  AM-PAC 6 Clicks Daily Activity (OT)  -CL      User Key  (r) = Recorded By, (t) = Taken By, (c) = Cosigned By    Initials Name Provider Type    LS Yakelin Alberts, PT Physical Therapist    CL Martha Blackwood, OT Occupational Therapist           Time Calculation:   Time Calculation- OT     Row Name 01/09/19 1417             Time Calculation- OT    OT Start Time  1417  -CL      OT Received On  01/09/19  -CL      OT Goal Re-Cert Due Date  01/17/19  -CL         Timed Charges    63903 - OT Therapeutic Exercise Minutes  15  -CL        User Key  (r) = Recorded By, (t) = Taken By, (c) = Cosigned By    Initials Name Provider Type    CL Martha Blackwood, OT Occupational Therapist           Therapy Suggested Charges     Code   Minutes Charges    11755 (CPT®) Hc Ot Neuromusc Re Education Ea 15 Min      37050 (CPT®) Hc Ot Ther Proc Ea 15 Min 15 1    35112 (CPT®) Hc Ot Therapeutic Act Ea 15 Min      14134 (CPT®) Hc Ot Manual Therapy Ea 15 Min      88000 (CPT®) Hc Ot Iontophoresis Ea 15 Min      43144 (CPT®) Hc Ot Elec Stim Ea-Per 15 Min      95554 (CPT®) Hc Ot Ultrasound Ea 15 Min      38756 (CPT®) Hc Ot Self Care/Mgmt/Train Ea 15 Min      Total  15 1        Therapy Charges for Today     Code Description Service Date Service Provider Modifiers Qty    [id number] HC OT THER PROC EA 15 MIN 1/9/2019  Martha Blackwood, OT GO 1               Martha Blackwood, OT  1/9/2019

## 2019-01-09 NOTE — PROGRESS NOTES
"Patient Name:  Todd Phillips  YOB: 1950  [id number]    Surgery Progress Note    Date of visit: 1/9/2019    Subjective   Subjective: More sleepy today, but arousable.  Has not been up yet today.  Only taking very small amounts of juice and ice chips PO.  Still without bowel function.  Coughing more today, non productive.          Objective     Objective:     BP (!) 178/106   Pulse 86   Temp 99.7 °F (37.6 °C) (Core)   Resp 20   Ht 182.9 cm (72\")   Wt 91.5 kg (201 lb 12.8 oz)   SpO2 98%   BMI 27.37 kg/m²     Intake/Output Summary (Last 24 hours) at 1/9/2019 1230  Last data filed at 1/9/2019 0800  Gross per 24 hour   Intake 2704 ml   Output 1550 ml   Net 1154 ml       CV:  Rhythm irregular and rate regular  L:  Somewhat coarse to auscultation bilaterally  Abd:  Mildly distended, soft, tender throughout without peritonitis  Ext:  No cyanosis, clubbing, edema  Neuro:  GCS 14, sleepy but arousable    Labs that are back at this time have been reviewed.          Assessment/Plan     Assessment/ Plan:      67 yo man s/p [facility] 12/31/18, now with adult necrotizing enterocolitis.  Moved to ICU 1/5/19 and now slowly improving.      - continue clears and TPN, still has minimal PO intake and no bowel function  - continue broad antibiotics, ID following   - mobilize as much as possible, pulmonary toilet   - continue ICU care  - monitor for development of pneumonia given worsening cough         Hospital Problem List     * (Principal) Malignant neoplasm of head of pancreas (CMS/[facility])    Overview Signed 9/5/2018  9:58 AM by Olya Vaughn     Added automatically from request for surgery [id number]         Hyperlipidemia (Chronic)        COPD (chronic obstructive pulmonary disease) (CMS/[facility]) (Chronic)        Essential hypertension (Chronic)          Gastroesophageal reflux disease without esophagitis (Chronic)    DM2 (diabetes mellitus, type 2) (CMS/[facility]) (Chronic)        Paroxysmal atrial fibrillation (Chronic)    " Chronic anticoagulation, Eliquis (Chronic)    Post-operative confusion and somnolence, likely due to oversedation        Atrial fibrillation with RVR (CMS/HCC)    Adult necrotizing enterocolitis (CMS/HCC)    Encephalopathy              Nisha Ackerman MD  1/9/2019  12:30 PM

## 2019-01-09 NOTE — PLAN OF CARE
Problem: Patient Care Overview  Goal: Plan of Care Review  Outcome: Ongoing (interventions implemented as appropriate)   01/09/19 6589   OTHER   Outcome Summary Pt session limited d/t lethargy and poor command following, deferred any EOB or OOB activity. Pt's spouse educated on PROM HEP and given handout. Recommend cont skilled IPOT POC.    Coping/Psychosocial   Plan of Care Reviewed With patient;spouse

## 2019-01-09 NOTE — PROGRESS NOTES
Continued Stay Note  [facility]     Patient Name: Todd Phillips  MRN: [id number]  Today's Date: 1/9/2019    Admit Date: 12/31/2018    Discharge Plan     Row Name 01/09/19 0938       Plan    Plan  TBD    Patient/Family in Agreement with Plan  yes    Plan Comments  Talked with Faustina Donaldson about referral to [facility].  She is following him for possible transfer.  Family may take him home if it is possible and continue [facility].          Discharge Codes    No documentation.       Expected Discharge Date and Time     Expected Discharge Date Expected Discharge Time    Jan 12, 2019             Lico Cadena RN

## 2019-01-09 NOTE — PROGRESS NOTES
Pulmonary/Critical Care Follow-up     LOS: 9 days   Patient Care Team:  Adiel Denney MD as PCP - General  Loida Campbell APRN as Nurse Practitioner (Nurse Practitioner)    Chief Complaint / reason : Pancreatic cancer / medical management of post-operative conditions including diabetes, atrial fibrillation.        Subjective    68-year-old male with a history of biliary tract obstruction who underwent cholecystectomy last August with subsequent ERCP for biliary stricture with stent placement.  He subsequently had an FNA on September 28 which was suspicious for adenocarcinoma in the head of the pancreas.  He had a repeat ERCP on 10/1/2018 and a new stent was placed.  He has been treated multiple times for recurrent bacteremias associated with his biliary obstruction.  He was subsequently treated with neoadjuvant chemotherapy.    The patient underwent a Whipple procedure with wedge biopsy of liver tumor and portal vein resection/lateral repair with gastrojejunostomy tube placement by Dr. Brody on December 31, 2018.  He is transferred to the intensive care unit on January 5 with increasing confusion and tachypnea and a CT scan showing portal venous gas, pneumatosis/ileus and intra-abdominal fluid.    Interval History:   Patient is more drowsy today.  Low grade fever.  Family reports he has a cough.  He has a tremor which family reports is worse than baseline.  Currently in SR.   BP elevated today.  Family at bedside.     History taken from: Patient, staff.    PMH/FH/Social History were reviewed and updated appropriately in the electronic medical record.     Review of Systems:    Review of 14 systems was completed with positives and pertinent negatives noted in the subjective section.  All other systems reviewed and are negative.         Objective     Vital Signs  Temp:  [98.3 °F (36.8 °C)-100.4 °F (38 °C)] 99.9 °F (37.7 °C)  Heart Rate:  [] 79  Resp:  [18-28] 24  BP: (134-186)/()  152/98  01/08 0701 - 01/09 0700  In: 3795 [I.V.:627]  Out: 1800 [Urine:1525]  Body mass index is 27.37 kg/m².     Physical Exam:     Constitutional:   Drowsy, cooperative, in no acute distress   Head:   Normocephalic, without obvious abnormality, atraumatic   Eyes:           Lids and lashes normal, conjunctivae and sclerae normal.  No icterus, no pallor, corneas clear, PER   ENMT:  Ears appear intact with no abnormalities noted     No oral lesions, no thrush, oral mucosa moist   Neck:  No adenopathy, supple, trachea midline, no thyromegaly, no JVD   Lungs/Resp:    Normal effort, symmetric chest rise, no crepitus, clear to      auscultation bilaterally, no chest wall tenderness                Heart/CV:   Regular rhythm and normal rate, normal S1 and S2, no            murmur   Abdomen/GI:    Positive bowel sounds, midline wound with staples intact, no masses, no organomegaly, soft, mildly tender, right drain site with minimal serous drainage, non-distended   :    Deferred   Extremities/MSK:  No clubbing or cyanosis.  No edema.  Normal tone.    No deformities.    Pulses:  Pulses palpable and equal bilaterally   Skin:  No bleeding, bruising or rash   Heme/Lymph:  No cervical or supraclavicular adenopathy.   Neurologic:    Psychiatric:    Moves all extremities with no obvious focal motor deficit.  Cranial nerves 2 - 12 grossly intact, positive asterixis.   Drowsy, normal affect.      Results Review:     I reviewed the patient's new clinical results.   Results from last 7 days   Lab Units  01/09/19   0417  01/08/19 2028 01/08/19   0311  01/07/19   0750   SODIUM mmol/L  131*   --   131*  133   POTASSIUM mmol/L  3.7  3.4*  3.3*  3.7   CHLORIDE mmol/L  102   --   101  104   CO2 mmol/L  22.0   --   23.0  21.0   BUN mg/dL  25*   --   28*  33*   CREATININE mg/dL  0.92   --   0.88  0.93   CALCIUM mg/dL  8.0*   --   8.1*  7.7*   BILIRUBIN mg/dL  0.8   --   0.8  0.8   ALK PHOS U/L  188*   --   181*  117*   ALT (SGPT) U/L  35    --   28  24   AST (SGOT) U/L  46*   --   43*  41*   GLUCOSE mg/dL  246*   --   230*  202*     Results from last 7 days   Lab Units  01/08/19   0311  01/07/19   0750  01/06/19   0506   WBC 10*3/mm3  20.45*  23.91*  18.36*   HEMOGLOBIN g/dL  7.9*  8.7*  9.0*   HEMATOCRIT %  23.4*  25.8*  27.1*   PLATELETS 10*3/mm3  297  297  279   MONOCYTES % %   --    --   5.0     Results from last 7 days   Lab Units  01/06/19   0350   PH, ARTERIAL pH units  7.525*   PO2 ART mm Hg  61.6*   PCO2, ARTERIAL mm Hg  24.9   HCO3 ART mmol/L  20.6     Results from last 7 days   Lab Units  01/09/19   0417  01/08/19   0311  01/07/19   0750   MAGNESIUM mg/dL  1.9  1.8  1.9  1.9   PHOSPHORUS mg/dL  3.4  3.0  2.4  2.4   procalcitonin 1/6: 6.71 -> 1/7: 3.52.         I reviewed the patient's new imaging including images and reports.  CXR 1/6/18:  IMPRESSION:  Low lung volumes with increased markings at the lung bases  Bilaterally.    CT Abd/pelvis 1/5/19:  IMPRESSION:  There are small bilateral pleural effusions. There is a  fluid collection seen surrounding the liver and spleen containing air in  the fluid collection around the liver. There is air seen scattered  throughout the abdomen which may be postoperative. There is air  identified within the mesenteric vessels with findings suggesting  pneumatosis throughout scattered small bowel loops within the mid  abdomen. Fluid throughout the colon suggesting clinical presentation of  diarrhea. Gastrostomy tube is in satisfactory position.      Medication Review:     budesonide 0.5 mg Nebulization BID - RT   enoxaparin 1 mg/kg Subcutaneous Q12H   Fat Emulsion Fish Oil Based 200 mL Intravenous Q24H   flecainide 50 mg Oral Q12H   insulin regular 0-9 Units Subcutaneous Q6H   insulin regular 8 Units Subcutaneous Q6H   ipratropium-albuterol 3 mL Nebulization 4x Daily - RT   Linezolid 600 mg Intravenous Q12H   meropenem 1 g Intravenous Q8H   metoprolol tartrate 25 mg Oral Q12H   micafungin (MYCAMINE) IV  100 mg Intravenous Q24H   pantoprazole 40 mg Intravenous Q AM   Scopolamine 1 patch Transdermal Q72H   sodium chloride 10 mL Intravenous Q12H       Adult Central 2-in-1 TPN  Last Rate: 75 mL/hr at 01/08/19 1724   Adult Central 2-in-1 TPN     Pharmacy to Dose TPN         Assessment/Plan       Malignant neoplasm of head of pancreas (CMS/HCC)    Hyperlipidemia    COPD (chronic obstructive pulmonary disease) (CMS/HCC)    Essential hypertension    Gastroesophageal reflux disease without esophagitis    DM2 (diabetes mellitus, type 2) (CMS/HCC)    Paroxysmal atrial fibrillation    Chronic anticoagulation, Eliquis    Post-operative confusion and somnolence, likely due to oversedation    Atrial fibrillation with RVR (CMS/HCC)    Adult necrotizing enterocolitis (CMS/HCC)    Encephalopathy    67 YO with h/o DMII, CKD3, GERD, PAF on Eliquis, locally advanced pancreatic cancer with zulay-adjuvant chemotherapy admitted post Pancreaticoduodenectomy, wedge biopsy of liver, portal vein resection and lateral repair and GJ tube by Dr. Brody on 12/31/18.      Moved to ICU on 1/5/18 due to concerns of breathing difficulty, encephalopathy and findings of pneumatosis, portal venous gas and ileus on CT abdomen/pelvis and concern for sepsis.      Appears to be getting better although somewhat somnolent today.     Plan:  1. Lovenox for anticoagulation for atrial fibrilation.  Eventually transition back to Eliquis when okay with surgery.   2. Antibiotics per ID.   3. Increase scheduled regular insulin.  Continue correction insulin.   4. Continue TPN.   5. Continue flecainide.   6. Add scheduled metoprolol.   7. Judicious use of narcotics for pain management.    8. PT/OT.  9. Monitor and replace electrolytes.   10. Check ammonia level.   11. Check CXR.       Gerardo Messina MD  01/09/19  4:07 PM    *. Please note that portions of this note were completed with a voice recognition program. Efforts were made to edit the dictations, but  occasionally words are mistranscribed.

## 2019-01-10 ENCOUNTER — APPOINTMENT (OUTPATIENT)
Dept: CT IMAGING | Facility: HOSPITAL | Age: 69
End: 2019-01-10

## 2019-01-10 LAB
ALBUMIN SERPL-MCNC: 2.71 G/DL (ref 3.2–4.8)
ALBUMIN/GLOB SERPL: 1 G/DL (ref 1.5–2.5)
ALP SERPL-CCNC: 223 U/L (ref 25–100)
ALT SERPL W P-5'-P-CCNC: 45 U/L (ref 7–40)
ANION GAP SERPL CALCULATED.3IONS-SCNC: 5 MMOL/L (ref 3–11)
ARTERIAL PATENCY WRIST A: POSITIVE
AST SERPL-CCNC: 58 U/L (ref 0–33)
ATMOSPHERIC PRESS: ABNORMAL MMHG
BACTERIA SPEC AEROBE CULT: NORMAL
BASE EXCESS BLDA CALC-SCNC: 0.6 MMOL/L (ref 0–2)
BDY SITE: ABNORMAL
BILIRUB SERPL-MCNC: 0.8 MG/DL (ref 0.3–1.2)
BODY TEMPERATURE: 37 C
BUN BLD-MCNC: 27 MG/DL (ref 9–23)
BUN/CREAT SERPL: 33.3 (ref 7–25)
CALCIUM SPEC-SCNC: 7.9 MG/DL (ref 8.7–10.4)
CHLORIDE SERPL-SCNC: 101 MMOL/L (ref 99–109)
CO2 BLDA-SCNC: 24.1 MMOL/L (ref 23–27)
CO2 SERPL-SCNC: 23 MMOL/L (ref 20–31)
COHGB MFR BLD: 1.8 % (ref 0–2)
CREAT BLD-MCNC: 0.81 MG/DL (ref 0.6–1.3)
DEPRECATED RDW RBC AUTO: 54.7 FL (ref 37–54)
ERYTHROCYTE [DISTWIDTH] IN BLOOD BY AUTOMATED COUNT: 16.8 % (ref 11.3–14.5)
GFR SERPL CREATININE-BSD FRML MDRD: 94 ML/MIN/1.73
GLOBULIN UR ELPH-MCNC: 2.8 GM/DL
GLUCOSE BLD-MCNC: 208 MG/DL (ref 70–100)
GLUCOSE BLDC GLUCOMTR-MCNC: 151 MG/DL (ref 70–130)
GLUCOSE BLDC GLUCOMTR-MCNC: 198 MG/DL (ref 70–130)
GLUCOSE BLDC GLUCOMTR-MCNC: 225 MG/DL (ref 70–130)
GLUCOSE BLDC GLUCOMTR-MCNC: 270 MG/DL (ref 70–130)
HCO3 BLDA-SCNC: 23.2 MMOL/L (ref 20–26)
HCT VFR BLD AUTO: 26.4 % (ref 38.9–50.9)
HCT VFR BLD CALC: 24.5 %
HGB BLD-MCNC: 8.6 G/DL (ref 13.1–17.5)
HGB BLDA-MCNC: 8 G/DL (ref 13.5–17.5)
HOROWITZ INDEX BLD+IHG-RTO: 31 %
MAGNESIUM SERPL-MCNC: 2.1 MG/DL (ref 1.3–2.7)
MCH RBC QN AUTO: 29.3 PG (ref 27–31)
MCHC RBC AUTO-ENTMCNC: 32.6 G/DL (ref 32–36)
MCV RBC AUTO: 89.8 FL (ref 80–99)
METHGB BLD QL: 0.4 % (ref 0–1.5)
MODALITY: ABNORMAL
NOTE: ABNORMAL
OXYHGB MFR BLDV: 95.3 % (ref 94–99)
PCO2 BLDA: 28.5 MM HG
PCO2 TEMP ADJ BLD: 28.5 MM HG (ref 35–48)
PH BLDA: 7.52 PH UNITS (ref 7.35–7.45)
PH, TEMP CORRECTED: 7.52 PH UNITS
PHOSPHATE SERPL-MCNC: 3.6 MG/DL (ref 2.4–5.1)
PLATELET # BLD AUTO: 311 10*3/MM3 (ref 150–450)
PMV BLD AUTO: 10.4 FL (ref 6–12)
PO2 BLDA: 76.8 MM HG (ref 83–108)
PO2 TEMP ADJ BLD: 76.8 MM HG (ref 83–108)
POTASSIUM BLD-SCNC: 3.7 MMOL/L (ref 3.5–5.5)
PROT SERPL-MCNC: 5.5 G/DL (ref 5.7–8.2)
RBC # BLD AUTO: 2.94 10*6/MM3 (ref 4.2–5.76)
SODIUM BLD-SCNC: 129 MMOL/L (ref 132–146)
VENTILATOR MODE: ABNORMAL
WBC NRBC COR # BLD: 16.22 10*3/MM3 (ref 3.5–10.8)

## 2019-01-10 PROCEDURE — 74178 CT ABD&PLV WO CNTR FLWD CNTR: CPT

## 2019-01-10 PROCEDURE — 84100 ASSAY OF PHOSPHORUS: CPT

## 2019-01-10 PROCEDURE — 25010000002 MAGNESIUM SULFATE PER 500 MG OF MAGNESIUM

## 2019-01-10 PROCEDURE — 99232 SBSQ HOSP IP/OBS MODERATE 35: CPT | Performed by: INTERNAL MEDICINE

## 2019-01-10 PROCEDURE — 25010000002 IOPAMIDOL 61 % SOLUTION: Performed by: SURGERY

## 2019-01-10 PROCEDURE — 97110 THERAPEUTIC EXERCISES: CPT

## 2019-01-10 PROCEDURE — 82805 BLOOD GASES W/O2 SATURATION: CPT

## 2019-01-10 PROCEDURE — 93010 ELECTROCARDIOGRAM REPORT: CPT | Performed by: INTERNAL MEDICINE

## 2019-01-10 PROCEDURE — 25010000002 POTASSIUM CHLORIDE PER 2 MEQ OF POTASSIUM

## 2019-01-10 PROCEDURE — 94799 UNLISTED PULMONARY SVC/PX: CPT

## 2019-01-10 PROCEDURE — 94760 N-INVAS EAR/PLS OXIMETRY 1: CPT

## 2019-01-10 PROCEDURE — 25010000002 ENOXAPARIN PER 10 MG: Performed by: INTERNAL MEDICINE

## 2019-01-10 PROCEDURE — 36600 WITHDRAWAL OF ARTERIAL BLOOD: CPT

## 2019-01-10 PROCEDURE — 0 DIATRIZOATE MEGLUMINE & SODIUM PER 1 ML

## 2019-01-10 PROCEDURE — 25010000002 LINEZOLID 600 MG/300ML SOLUTION: Performed by: INTERNAL MEDICINE

## 2019-01-10 PROCEDURE — 82962 GLUCOSE BLOOD TEST: CPT

## 2019-01-10 PROCEDURE — 25010000002 CALCIUM GLUCONATE PER 10 ML

## 2019-01-10 PROCEDURE — 97530 THERAPEUTIC ACTIVITIES: CPT

## 2019-01-10 PROCEDURE — 85027 COMPLETE CBC AUTOMATED: CPT | Performed by: INTERNAL MEDICINE

## 2019-01-10 PROCEDURE — 36415 COLL VENOUS BLD VENIPUNCTURE: CPT | Performed by: INTERNAL MEDICINE

## 2019-01-10 PROCEDURE — 25010000002 HYDROMORPHONE PER 4 MG: Performed by: INTERNAL MEDICINE

## 2019-01-10 PROCEDURE — 80053 COMPREHEN METABOLIC PANEL: CPT

## 2019-01-10 PROCEDURE — 83735 ASSAY OF MAGNESIUM: CPT

## 2019-01-10 PROCEDURE — 25010000002 MEROPENEM

## 2019-01-10 RX ADMIN — HYDROMORPHONE HYDROCHLORIDE 0.5 MG: 1 INJECTION, SOLUTION INTRAMUSCULAR; INTRAVENOUS; SUBCUTANEOUS at 22:08

## 2019-01-10 RX ADMIN — BUDESONIDE 0.5 MG: 0.5 INHALANT RESPIRATORY (INHALATION) at 08:01

## 2019-01-10 RX ADMIN — SMOFLIPID 200 ML: 6; 6; 5; 3 INJECTION, EMULSION INTRAVENOUS at 18:11

## 2019-01-10 RX ADMIN — LINEZOLID 600 MG: 600 INJECTION, SOLUTION INTRAVENOUS at 09:01

## 2019-01-10 RX ADMIN — FLECAINIDE ACETATE 50 MG: 50 TABLET ORAL at 21:04

## 2019-01-10 RX ADMIN — HYDROMORPHONE HYDROCHLORIDE 0.5 MG: 1 INJECTION, SOLUTION INTRAMUSCULAR; INTRAVENOUS; SUBCUTANEOUS at 09:01

## 2019-01-10 RX ADMIN — IPRATROPIUM BROMIDE AND ALBUTEROL SULFATE 3 ML: 2.5; .5 SOLUTION RESPIRATORY (INHALATION) at 08:01

## 2019-01-10 RX ADMIN — Medication: at 14:30

## 2019-01-10 RX ADMIN — SODIUM CHLORIDE, PRESERVATIVE FREE 10 ML: 5 INJECTION INTRAVENOUS at 21:04

## 2019-01-10 RX ADMIN — METOPROLOL TARTRATE 25 MG: 25 TABLET ORAL at 21:04

## 2019-01-10 RX ADMIN — MICAFUNGIN SODIUM 100 MG: 20 INJECTION, POWDER, LYOPHILIZED, FOR SOLUTION INTRAVENOUS at 20:00

## 2019-01-10 RX ADMIN — IPRATROPIUM BROMIDE AND ALBUTEROL SULFATE 3 ML: 2.5; .5 SOLUTION RESPIRATORY (INHALATION) at 13:11

## 2019-01-10 RX ADMIN — PANTOPRAZOLE SODIUM 40 MG: 40 INJECTION, POWDER, FOR SOLUTION INTRAVENOUS at 06:24

## 2019-01-10 RX ADMIN — CALCIUM GLUCONATE: 94 INJECTION, SOLUTION INTRAVENOUS at 18:11

## 2019-01-10 RX ADMIN — IPRATROPIUM BROMIDE AND ALBUTEROL SULFATE 3 ML: 2.5; .5 SOLUTION RESPIRATORY (INHALATION) at 19:36

## 2019-01-10 RX ADMIN — LINEZOLID 600 MG: 600 INJECTION, SOLUTION INTRAVENOUS at 21:39

## 2019-01-10 RX ADMIN — INSULIN HUMAN 6 UNITS: 100 INJECTION, SOLUTION PARENTERAL at 12:34

## 2019-01-10 RX ADMIN — HYDROMORPHONE HYDROCHLORIDE 0.5 MG: 1 INJECTION, SOLUTION INTRAMUSCULAR; INTRAVENOUS; SUBCUTANEOUS at 16:07

## 2019-01-10 RX ADMIN — HYDROMORPHONE HYDROCHLORIDE 0.5 MG: 1 INJECTION, SOLUTION INTRAMUSCULAR; INTRAVENOUS; SUBCUTANEOUS at 02:35

## 2019-01-10 RX ADMIN — BUDESONIDE 0.5 MG: 0.5 INHALANT RESPIRATORY (INHALATION) at 19:36

## 2019-01-10 RX ADMIN — METOPROLOL TARTRATE 25 MG: 25 TABLET ORAL at 09:01

## 2019-01-10 RX ADMIN — INSULIN HUMAN 2 UNITS: 100 INJECTION, SOLUTION PARENTERAL at 18:12

## 2019-01-10 RX ADMIN — MEROPENEM 1 G: 1 INJECTION, POWDER, FOR SOLUTION INTRAVENOUS at 00:03

## 2019-01-10 RX ADMIN — IPRATROPIUM BROMIDE AND ALBUTEROL SULFATE 3 ML: 2.5; .5 SOLUTION RESPIRATORY (INHALATION) at 16:32

## 2019-01-10 RX ADMIN — ENOXAPARIN SODIUM 90 MG: 100 INJECTION SUBCUTANEOUS at 21:04

## 2019-01-10 RX ADMIN — SODIUM CHLORIDE, PRESERVATIVE FREE 10 ML: 5 INJECTION INTRAVENOUS at 09:07

## 2019-01-10 RX ADMIN — IOPAMIDOL 95 ML: 612 INJECTION, SOLUTION INTRAVENOUS at 15:44

## 2019-01-10 RX ADMIN — FLECAINIDE ACETATE 50 MG: 50 TABLET ORAL at 09:01

## 2019-01-10 RX ADMIN — INSULIN HUMAN 2 UNITS: 100 INJECTION, SOLUTION PARENTERAL at 06:29

## 2019-01-10 RX ADMIN — MEROPENEM 1 G: 1 INJECTION, POWDER, FOR SOLUTION INTRAVENOUS at 16:07

## 2019-01-10 RX ADMIN — MEROPENEM 1 G: 1 INJECTION, POWDER, FOR SOLUTION INTRAVENOUS at 09:00

## 2019-01-10 RX ADMIN — ENOXAPARIN SODIUM 90 MG: 100 INJECTION SUBCUTANEOUS at 09:01

## 2019-01-10 RX ADMIN — SCOPALAMINE 1 PATCH: 1 PATCH, EXTENDED RELEASE TRANSDERMAL at 09:53

## 2019-01-10 NOTE — NURSING NOTE
With my 1800 assessment, I noticed that the patient looked like he had a change in rhythm on the monitor. His telemetry leads were replaced and an EKG was obtained to make sure there was no ST elevation. EKG was compared with yesterday's EKG and reviewed with Milton CELIS. He stated that there were small changes in some leads and we will continue to monitor. No interventions at this time.

## 2019-01-10 NOTE — PROGRESS NOTES
[facility] Progress Note        Antibiotics:  Anti-Infectives (From admission, onward)    Ordered     Dose/Rate Route Frequency Start Stop    01/07/19 0942  Linezolid (ZYVOX) 600 mg 300 mL     Ordering Provider:  Scot Higgins MD    600 mg  300 mL/hr over 60 Minutes Intravenous Every 12 Hours Scheduled 01/07/19 1100 01/17/19 0859    01/06/19 1306  meropenem (MERREM) 1 g/100 mL 0.9% NS VTB (mbp)     Ordering Provider:  Aletha Alvarez, RPH    1 g  over 3 Hours Intravenous Every 8 Hours 01/06/19 1600 01/12/19 2359    01/05/19 1820  micafungin 100 mg/100 mL 0.9% NS IVPB (mbp)     Scot Higgins MD reviewed the order on 01/10/19 0719.   Ordering Provider:  Scot Higgins MD    100 mg  over 60 Minutes Intravenous Every 24 Hours 01/05/19 2000 01/17/19 0718    01/05/19 1859  meropenem (MERREM) 1 g/100 mL 0.9% NS VTB (mbp)     Ordering Provider:  Levi Bustos, RPH    1 g  over 30 Minutes Intravenous Once 01/05/19 1945 01/05/19 2039 01/05/19 1859  vancomycin 2250 mg/500 mL 0.9% NS IVPB ([facility])     Ordering Provider:  Levi Bustos, RPH    2,250 mg Intravenous Once 01/05/19 1945 01/05/19 2009 01/01/19 0936  piperacillin-tazobactam (ZOSYN) 3.375 g in iso-osmotic dextrose 50 ml (premix)     Ordering Provider:  Miquel Brody MD    3.375 g  over 30 Minutes Intravenous Once 01/01/19 1030 01/01/19 1209    12/31/18 0632  ceFAZolin in dextrose (ANCEF) IVPB solution 2 g     Ordering Provider:  Miquel Brody MD    2 g  over 30 Minutes Intravenous Once 12/31/18 0634 12/31/18 0800    12/31/18 0632  metroNIDAZOLE (FLAGYL) IVPB 500 mg     Ordering Provider:  Mary Grace, Miquel Chepe, MD    500 mg  over 60 Minutes Intravenous Once 12/31/18 0634 12/31/18 0830          CC: fatigue, abd pain    HPI:    Patient is a 68 y.o.  Yr old male with history of biliary tract obstruction, admitted to [facility] multiple times in the last 2 months including August 10 until August 16, underwent  cholecystectomy with pathology suggesting chronic cholecystitis, had ERCP on August 14 with biliary stricture/proximal duct dilation and had stent placement.  He was readmitted August 21, 2018 until August 25, 2018 with acute sepsis, Klebsiella bacteremia, discharged with oral antibiotics.  Readmitted September 12, 2018 until September 17, 2018 with ESBL Escherichia coli bacteremia seen by infectious disease in [place] and treated with Invanz, duration of therapy unclear but approximately until September 24.  During that period of time, concern for malignancy mentioned in notes and referred to [facility] for further biopsy, done September 28.  Presented once again to [facility] emergency room September 30, 2018 with acute onset abdominal pain/nausea/vomiting.  Blood cultures positive again with  Klebsiella species and  Transferred to [facility]. 10/1/18 ERCP with evidence for obstruction/purulence and new stent placed; He was treated with rocephin/flagyl and last seen by us early October; after that, he had more definitive diagnosis of pancreatic cancer and treated with neoadjuvant chemotherapy and right chest port placement.  In addition he required TPN.  Family not aware of any other specific microbiologic diagnosis since October; he was admitted December 31 and underwent pancreaticoduodenectomy, wedge biopsy of liver tumor and portal vein resection/lateral repair with gastrojejunostomy tube placement.  Moved to ICU on January 5 with concern regarding increased confusion/Tachypnea, abnormal CT scan with pneumatosis/ileus and portal venous gas in addition to intra-abdominal fluid per description of radiology.  Chest x-ray January 6 with low lung volumes and increased markings at lung bases bilaterally but no focal parenchymal opacification per radiology.  Head CT no acute intracranial abnormality.     1/10/19 Patient  sleepy/Confused and does not cooperate with a history or  "review of systems.  He answers yes/no to some simple questions but unclear reliability.  Nursing reports left arm PICC line placed January 6 and remains stable, chronic right port in the chest with no new redness/swelling or change there.  Indwelling Hassan catheter, G/J-tube, and ostomy bag over prior MADDY drain site; MADDY drain removed January 6.  No stool output since admission per nursing.  No rash.  On nasal cannula oxygen with no productive cough. LG fever at 100.6 last 24 hours     Patient does not cooperate with ROS otherwise      ROS:        1/10/19 as above, patient confused and not cooperative with review of systems          PE:   /90   Pulse 92   Temp 98.7 °F (37.1 °C) (Oral)   Resp 20   Ht 182.9 cm (72\")   Wt 91.5 kg (201 lb 12.8 oz)   SpO2 96%   BMI 27.37 kg/m²       GENERAL: He opens his eyes, follows simple commands but not oriented.  Nasal cannula oxygen  HEENT: Normocephalic, atraumatic.  PERRL. EOMI. No conjunctival injection. No icterus. Oropharynx clear without evidence of thrush or exudate. No evidence of peridontal disease.    NECK: Supple without nuchal rigidity. No mass.  LYMPH: No cervical, axillary or inguinal lymphadenopathy.  HEART: RRR; No murmur, rubs, gallops.   LUNGS: Diminished at bases bilaterally without wheezing, rales, rhonchi. Normal respiratory effort. Nonlabored. No dullness.  ABDOMEN: Soft, tender and distended. Positive bowel sounds managed. No rebound or guarding. NO mass or HSM.  Ostomy bag noted at right abdomen but no active drainage or fluid in bag.  Surgical site noted with no redness induration or warmth.  No mass bulge or fluctuance.  No crepitus or bulla.  Feeding tube site noted with no redness induration or warmth.  No mass bulge or fluctuance appreciated.  No crepitus or bulla.  No purulence or skin necrosis  EXT:  No cyanosis, clubbing. No cord.  : Genitalia generally unremarkable.  With Hassan catheter.  MSK: FROM without joint effusions noted " arms/legs.    SKIN: Warm and dry without cutaneous eruptions on Inspection/palpation.    NEURO: He moves all 4 extremities spontaneously but he is not cooperate with a detailed motor or sensory exam     Right chest port with no redness induration or warmth.  No crepitus or bulla.  No purulence     Left arm PICC line with no redness or purulence     No peripheral stigmata/phenomena of endocarditis        Laboratory Data    Results from last 7 days   Lab Units  01/10/19   0521  01/08/19   0311  01/07/19   0750   WBC 10*3/mm3  16.22*  20.45*  23.91*   HEMOGLOBIN g/dL  8.6*  7.9*  8.7*   HEMATOCRIT %  26.4*  23.4*  25.8*   PLATELETS 10*3/mm3  311  297  297     Results from last 7 days   Lab Units  01/10/19   0521   SODIUM mmol/L  129*   POTASSIUM mmol/L  3.7   CHLORIDE mmol/L  101   CO2 mmol/L  23.0   BUN mg/dL  27*   CREATININE mg/dL  0.81   GLUCOSE mg/dL  208*   CALCIUM mg/dL  7.9*     Results from last 7 days   Lab Units  01/10/19   0521   ALK PHOS U/L  223*   BILIRUBIN mg/dL  0.8   ALT (SGPT) U/L  45*   AST (SGOT) U/L  58*         Results from last 7 days   Lab Units  01/06/19   1406   CRP mg/dL  20.71*       Estimated Creatinine Clearance: 109.8 mL/min (by C-G formula based on SCr of 0.81 mg/dL).      Microbiology:      Radiology:  Imaging Results (last 72 hours)     Procedure Component Value Units Date/Time    XR Chest 1 View [[id number]] Collected:  01/06/19 0915     Updated:  01/07/19 1411    Narrative:          EXAMINATION: XR CHEST 1 VW - 1/6/2019     INDICATION: Z01.89-Encounter for other specified special examinations;  C25.9-Malignant neoplasm of pancreas, unspecified; Z74.09-Other reduced  mobility      COMPARISON: 01/05/2019     FINDINGS: Portable chest reveals low lung volumes. Deep line catheter on  the right with tip in the SVC. Mild increased markings at lung bases  bilaterally with degenerative changes seen within the spine.  No focal  parenchymal opacification present.       Impression:       Low  lung volumes with increased markings at the lung bases  bilaterally.     DICTATED:   1/6/2019  EDITED/ls :   1/6/2019      This report was finalized on 1/7/2019 2:09 PM by Dr. Tasha Guzmán MD.       CT Head Without Contrast [[id number]] Collected:  01/06/19 1803     Updated:  01/07/19 1410    Narrative:       EXAMINATION: CT HEAD WO CONTRAST - 1/6/2019     INDICATION:  Z01.89-Encounter for other specified special examinations;  C25.9-Malignant neoplasm of pancreas, unspecified; Z74.09-Other reduced  mobility. Mental status change.     TECHNIQUE: Multiple axial CT imaging is obtained of the head without the  administration of intravenous contrast.     The radiation dose reduction device was turned on for each scan per the  ALARA (As Low as Reasonably Achievable) protocol.     COMPARISON: 11/17/2018     FINDINGS: Atrophy of the brain with low-density area seen in the  periventricular and subcortical white matter suggesting chronic small  vessel ischemic change. No hemorrhage or hydrocephalus. No mass, mass  effect, or midline shift. No abnormal extra-axial fluid collections  identified. The bony structures reveal no evidence of osseous  abnormality. The visualized paranasal sinuses are clear. The mastoid air  cells are patent.       Impression:       Chronic changes seen within the brain with no acute  intracranial abnormality identified.     DICTATED:   1/6/2019  EDITED/ls :   1/6/2019         This report was finalized on 1/7/2019 2:08 PM by Dr. Tasha Guzmán MD.       XR Chest 1 View [[id number]] Collected:  01/05/19 1928     Updated:  01/06/19 1008    Narrative:          EXAMINATION: XR CHEST 1 VW - 1/5/2019     INDICATION:  Z01.89-Encounter for other specified special examinations;  C25.9-Malignant neoplasm of pancreas, unspecified; Z74.09-Other reduced  mobility      COMPARISON: 01/05/2019     FINDINGS: Portable chest reveals deep line catheter on the right with  tip in the SVC. Mild increased markings  at left lung base with small  left pleural effusion. Improvement seen in aeration of the lung bases in  the interval. Degenerative change is seen within the spine. Pulmonary  vascularity is within normal limits.           Impression:       Improvement seen in aeration of the lung bases in the  interval with only minimal right residual markings at the lung bases.     DICTATED:   1/5/2019  EDITED/ls :   1/5/2019      This report was finalized on 1/6/2019 10:06 AM by Dr. Tasha Guzmán MD.       CT Abdomen Pelvis Without Contrast [[id number]] Collected:  01/05/19 1645     Updated:  01/05/19 1655    Narrative:       EXAMINATION: CT ABDOMEN/PELVIS WO CONTRAST - 1/5/2019      INDICATION:  Z01.89-Encounter for other specified special examinations;  C25.9-Malignant neoplasm of pancreas, unspecified; Z74.09-Other reduced  mobility. Abdominal pain.     TECHNIQUE: Multiple axial CT imaging is obtained of the abdomen and  pelvis without the administration of oral or intravenous contrast.     The radiation dose reduction device was turned on for each scan per the  ALARA (As Low as Reasonably Achievable) protocol.     COMPARISON: NONE     FINDINGS: There are small bilateral pleural effusions. Atelectatic  change is seen in the lung bases bilaterally. Extraluminal air is  identified throughout the upper abdomen. There is fluid seen surrounding  the spleen as well as the liver. There is a small to  moderate size  hiatal hernia. There is gastric distention. There are postoperative  changes seen from Whipple procedure. There is a small collection of  fluid identified along the leftward aspect of the liver. There is some  free fluid identified near the sarbjit hepatis. There is a G-tube  identified in satisfactory position. Kidneys and adrenal glands within  normal limits. There is gaseous distention of the small bowel loops  suggesting a postoperative ileus. There is air identified within the  mesenteric vessels. Findings are  concerning for pneumatosis of several  small bowel loops centrally within the abdomen. There is fluid seen  scattered throughout the colon. Mild distention of the colon. There is  some fluid seen within the pelvis.     Pelvis: The pelvic portion of the gastrointestinal tract is within  normal limits. Fluid seen within the colon. The bladder reveals  air-fluid level likely from prior instrumentation. No pelvic adenopathy.  Bony structures reveal no evidence of osseous abnormality.       Impression:       There are small bilateral pleural effusions. There is a  fluid collection seen surrounding the liver and spleen containing air in  the fluid collection around the liver. There is air seen scattered  throughout the abdomen which may be postoperative. There is air  identified within the mesenteric vessels with findings suggesting  pneumatosis throughout scattered small bowel loops within the mid  abdomen. Fluid throughout the colon suggesting clinical presentation of  diarrhea. Gastrostomy tube is in satisfactory position.     DICTATED:   1/5/2019  EDITED/ls :   1/5/2019      This report was finalized on 1/5/2019 4:53 PM by Dr. Tasha Guzmán MD.               Impression:     --Acute postoperative leukocytosis and low-grade fever, approx , at risk for infection multiple sites including abdomen, lung, urine, IV lines, etc.  IV sites look benign at present and Blood cultures negative so far.  Urinalysis with negative leuk esterase/nitrites and UTI generally less likely at present.  Low lung volumes on chest x-ray not unexpected after abdominal surgery, currently no cough and while he is at risk for pneumonia, unable to confirm at present.  Abdomen remains primary concern with enterocolitis, postoperative fluid and on empiric antibiotics with these concerns in mind with Zyvox/Merrem and micafungin.  At risk for MDR organisms with prior history ESBL and multiple hospitalizations, etc. WBC count trended down some  1/8-1/10.     --Acute postoperative enterocolitis.  Recent surgery as outlined above in the setting of pancreatic cancer, prior neoadjuvant chemotherapy and tube feeding.  Tube feedings stopped and TPN restarted.  Dr. Brody following closely to determine timing/option/threshold for further surgical intervention or serial imaging/percutaneous drainage etc.  I d/w Dr Brody     --Pancreatic cancer with prior neoadjuvant chemotherapy, indwelling chronic port and recent surgery as outlined above.     --COPD     --Diabetes type 2     --Chronic anticoagulation with history A. fib and past antiarrhythmic therapy     --Hx ESBL    PLAN:      --IV Zyvox/Merrem and Mycamine     --Check/review labs cultures and scans     --Discussed with family, history per them     --History per nursing as well     --Discussed with microbiology     --Highly complex set of issues with high risk for further serious morbidity and other serious sequela    --d/w Dr Brody and family     --If not steadily improving, you should give consideration to serial imaging and consideration for further surgical intervention versus interventional radiology options, etc.           Scot Higgins MD  1/10/2019

## 2019-01-10 NOTE — PROGRESS NOTES
Continued Stay Note  [facility]     Patient Name: Todd Phillips  MRN: [id number]  Today's Date: 1/10/2019    Admit Date: 12/31/2018    Discharge Plan     Row Name 01/10/19 1127       Plan    Plan  LT[facility]    Plan Comments  Talked to Mrs. Phillips about possible transfer to L/TACH hospital to continue IV antibiotics and TPN.  She was in agreement.  Called Dr. Brody and he L/TACH would be good d/c plan.  Mrs. Phillips would like to go to [facility].  Call to Dr. Higgins and he said he may be ready next week for L/TACH.  He was in agreement for him to go to [facility]. when medically ready.     Final Discharge Disposition Code  63 - [facility]        Discharge Codes    No documentation.       Expected Discharge Date and Time     Expected Discharge Date Expected Discharge Time    Jan 12, 2019             Lico Cadena RN

## 2019-01-10 NOTE — NURSING NOTE
"Talked with Radiology Department. They informed me that I needed to page Dr. Rivas and receive consent regarding contrast dye administration. The patient's chart has contrast dye listed as an allergy although they just did not want contrast administered due to chronic kidney disease. I paged Dr. Rivas and spoke with someone who was in OR with him. I said that the Radiologist needed me to confirm that he still wanted us to complete the scan regardless of his \"allergy.\" He said yes, get the scan done with contrast. Radiology department informed.   "

## 2019-01-10 NOTE — THERAPY TREATMENT NOTE
Acute Care - Physical Therapy Treatment Note  [facility]     Patient Name: Todd Phillips  : 1950  MRN: [id number]  Today's Date: 1/10/2019  Onset of Illness/Injury or Date of Surgery: 18  Date of Referral to PT: 19  Referring Physician: MD Mayuri    Admit Date: 2018    Visit Dx:    ICD-10-CM ICD-9-CM   1. Impaired functional mobility, balance, gait, and endurance Z74.09 V49.89   2. Laboratory test Z01.89 V72.60   3. Pancreatic cancer (CMS/HCC) C25.9 157.9   4. 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)   • DM2 (diabetes mellitus, type 2) (CMS/HCC)   • Paroxysmal atrial fibrillation   • Chronic anticoagulation, Eliquis   • Encounter for monitoring flecainide therapy   • Malignant neoplasm of head of pancreas (CMS/HCC)   • Bacteremia due to Escherichia coli   • Biliary obstruction   • Thrombocytopenia (CMS/HCC)   • Anemia due to chemotherapy   • Fever   • Post-operative confusion and somnolence, likely due to oversedation   • Atrial fibrillation with RVR (CMS/HCC)   • Adult necrotizing enterocolitis (CMS/HCC)   • Encephalopathy       Therapy Treatment    Rehabilitation Treatment Summary     Row Name 01/10/19 1350             Treatment Time/Intention    Discipline  physical therapist  -LS      Document Type  therapy note (daily note)  -LS      Subjective Information  complains of;pain  -LS      Mode of Treatment  physical therapy  -LS      Patient Effort  adequate  -LS      Existing Precautions/Restrictions  fall;oxygen therapy device and L/min abd incision; colostomy  -LS      Recorded by [LS] Yakelin Alberts, PT  01/10/19 1617      Row Name 01/10/19 1350             Vital Signs    Pre Systolic BP Rehab  140  -LS      Pre Treatment Diastolic BP  86  -LS      Pretreatment Heart Rate (beats/min)  89  -LS      Posttreatment Heart Rate (beats/min)  89  -LS      Pre SpO2 (%)  94  -LS      O2 Delivery Pre Treatment  supplemental O2  -LS      Post SpO2 (%)  93  -LS      O2 Delivery Post Treatment  supplemental O2  -LS      Pre Patient Position  Supine  -LS      Intra Patient Position  Standing  -LS      Post Patient Position  Supine  -LS      Recorded by [LS] Yakelin Alberts, PT 01/10/19 1617      Row Name 01/10/19 1350             Cognitive Assessment/Intervention- PT/OT    Affect/Mental Status (Cognitive)  low arousal/lethargic  -LS      Orientation Status (Cognition)  oriented to;person  -LS      Follows Commands (Cognition)  follows one step commands;25-49% accuracy;physical/tactile prompts required;repetition of directions required;verbal cues/prompting required  -LS      Cognitive Function (Cognitive)  safety deficit  -LS      Safety Deficit (Cognitive)  severe deficit;insight into deficits/self awareness;safety precautions awareness  -LS      Personal Safety Interventions  fall prevention program maintained;gait belt;nonskid shoes/slippers when out of bed  -LS      Recorded by [LS] Yakelin Alberts, PT 01/10/19 1617      Row Name 01/10/19 1350             Bed Mobility Assessment/Treatment    Supine-Sit [place] (Bed Mobility)  moderate assist (50% patient effort);2 person assist;verbal cues  -LS      Sidelying-Sit [place] (Bed Mobility)  maximum assist (25% patient effort);2 person assist;verbal cues  -LS      Assistive Device (Bed Mobility)  bed rails;draw sheet;head of bed elevated  -LS      Recorded by [LS] Yakelin Alberts, PT 01/10/19 1617      Row Name 01/10/19 1350             Transfer Assessment/Treatment    Comment (Transfers)  STS x4 from EOB. PT/tech on either side for BUE support and blocking feet/knees.   -LS       Recorded by [LS] Yakelin Alberts, PT 01/10/19 1617      Row Name 01/10/19 1350             Sit-Stand Transfer    Sit-Stand [place] (Transfers)  moderate assist (50% patient effort);2 person assist;verbal cues  -LS      Recorded by [LS] Yakelin Alberts, PT 01/10/19 1617      Row Name 01/10/19 1350             Stand-Sit Transfer    Stand-Sit [place] (Transfers)  moderate assist (50% patient effort);2 person assist;verbal cues  -LS      Recorded by [LS] Yakelin Alberts, PT 01/10/19 1617      Row Name 01/10/19 1350             Gait/Stairs Assessment/Training    Gait/Stairs Assessment/Training  gait/ambulation independence  -LS      [place] Level (Gait)  maximum assist (25% patient effort);2 person assist  -LS      Distance in Feet (Gait)  1 side step towards HOB  -LS      Comment (Gait/Stairs)  Max assist for weightshifting and assisting pt with advancement of LEs.   -LS      Recorded by [LS] Yakelin Alberts, PT 01/10/19 1617      Row Name 01/10/19 1350             Therapeutic Exercise    Therapeutic Exercise  supine, lower extremities  -LS      30408 - PT Therapeutic Exercise Minutes  5  -LS      96327 - PT Therapeutic Activity Minutes  18  -LS      Recorded by [LS] Yakelin Alberts, PT 01/10/19 1617      Row Name 01/10/19 1350             Lower Extremity Supine Therapeutic Exercise    Performed, Supine Lower Extremity (Therapeutic Exercise)  hip flexion/extension;hip abduction/adduction;ankle pumps also LAQ in sitting  -LS      Exercise Type, Supine Lower Extremity (Therapeutic Exercise)  PROM (passive range of motion)  -LS      Sets/Reps Detail, Supine Lower Extremity (Therapeutic Exercise)  1/10; constant cues to attend; occasional active assist  -LS      Recorded by [LS] Yakelin Alberts, PT 01/10/19 1617      Row Name 01/10/19 1350             Static Sitting Balance    Level of [place] (Unsupported Sitting, Static Balance)  minimal assist, 75% patient effort  -LS      Sitting Position (Unsupported  Sitting, Static Balance)  sitting on edge of bed  -LS      Recorded by [LS] Yakelin Alberts, PT 01/10/19 1617      Row Name 01/10/19 1350             Static Standing Balance    Level of [place] (Supported Standing, Static Balance)  moderate assist, 50 to 74% patient effort  -LS      Time Able to Maintain Position (Supported Standing, Static Balance)  1 to 2 minutes  -LS      Comment (Unsupported Standing, Static Balance)  15s x4  -LS      Recorded by [LS] Yakelin Alberts, PT 01/10/19 1617      Row Name 01/10/19 1350             Positioning and Restraints    Pre-Treatment Position  in bed  -LS      Post Treatment Position  bed  -LS      In Bed  notified nsg;fowlers;call light within reach;encouraged to call for assist;exit alarm on;with nsg;LUE elevated;RUE elevated;legs elevated  -LS      Recorded by [LS] Yakelin Alberts, PT 01/10/19 1617      Row Name 01/10/19 1350             Pain Scale: Numbers Pre/Post-Treatment    Pain Location - Side  Left  -LS      Pain Location - Orientation  lower  -LS      Pain Location  abdomen  -LS      Pain Intervention(s)  Repositioned;Ambulation/increased activity  -LS      Recorded by [LS] Yakelin Alberts, PT 01/10/19 1617      Row Name 01/10/19 1350             Pain Scale: FACES Pre/Post-Treatment    Pain: FACES Scale, Pretreatment  0-->no hurt  -LS      Pain: FACES Scale, Post-Treatment  0-->no hurt  -LS      Recorded by [LS] Yakelin Alberts, PT 01/10/19 1617      Row Name 01/10/19 1350             Plan of Care Review    Plan of Care Reviewed With  patient;spouse  -LS      Recorded by [LS] Yakelin Alberts, PT 01/10/19 1617      Row Name 01/10/19 1350             Outcome Summary/Treatment Plan (PT)    Daily Summary of Progress (PT)  progress toward functional goals is gradual  -LS      Recorded by [LS] Yakelin Alberts, PT 01/10/19 1617        User Key  (r) = Recorded By, (t) = Taken By, (c) = Cosigned By    Initials Name Effective Dates Discipline     Yakelin Alberts, PT 06/19/15 -   PT          Wound upper;midline abdomen incision (Active)   Dressing Appearance open to air 1/10/2019 12:00 PM   Closure Staples 1/10/2019 12:00 PM   Base clean;dry 1/10/2019 12:00 PM   Periwound dry;pink 1/10/2019 12:00 PM   Periwound Temperature warm 1/10/2019 12:00 PM   Periwound Skin Turgor firm 1/10/2019 12:00 PM   Drainage Amount none 1/10/2019 12:00 PM   Dressing Care, Wound open to air 1/9/2019  8:00 PM           Physical Therapy Education     Title: PT OT SLP Therapies (In Progress)     Topic: Physical Therapy (In Progress)     Point: Mobility training (In Progress)     Learning Progress Summary           Patient Acceptance, E,D, NR by LS at 1/10/2019  1:50 PM    Acceptance, E,D, VU,NR by LS at 1/8/2019  8:18 AM    Acceptance, E, NR by VG at 1/4/2019  3:04 PM    Comment:  Educated on HEP and correct mechanics for safe functional mobility. Reinforcement needed d/t current cognitive status.    Acceptance, E, NR by VG at 1/3/2019  9:35 AM    Comment:  Educated on HEP and correct mechanics for safe functional mobility. Reinforcement needed d/t impaired safety awareness.    Acceptance, E,D, NR by SJ at 1/1/2019  2:22 PM   Family Acceptance, E, NR by VG at 1/4/2019  3:04 PM    Comment:  Educated on HEP and correct mechanics for safe functional mobility. Reinforcement needed d/t current cognitive status.    Acceptance, E, NR by VG at 1/3/2019  9:35 AM    Comment:  Educated on HEP and correct mechanics for safe functional mobility. Reinforcement needed d/t impaired safety awareness.   Significant Other Acceptance, E,D, VU,NR by LS at 1/8/2019  8:18 AM    Acceptance, E,D, NR by SJ at 1/1/2019  2:22 PM                   Point: Home exercise program (In Progress)     Learning Progress Summary           Patient Acceptance, E,D, NR by LS at 1/10/2019  1:50 PM    Acceptance, E,D, VU,NR by LS at 1/8/2019  8:18 AM    Acceptance, E, NR by VG at 1/4/2019  3:04 PM    Comment:  Educated on HEP and correct mechanics for safe  functional mobility. Reinforcement needed d/t current cognitive status.    Acceptance, E, NR by VG at 1/3/2019  9:35 AM    Comment:  Educated on HEP and correct mechanics for safe functional mobility. Reinforcement needed d/t impaired safety awareness.    Acceptance, E,D, NR by SJ at 1/1/2019  2:22 PM   Family Acceptance, E, NR by VG at 1/4/2019  3:04 PM    Comment:  Educated on HEP and correct mechanics for safe functional mobility. Reinforcement needed d/t current cognitive status.    Acceptance, E, NR by VG at 1/3/2019  9:35 AM    Comment:  Educated on HEP and correct mechanics for safe functional mobility. Reinforcement needed d/t impaired safety awareness.   Significant Other Acceptance, E,D, VU,NR by LS at 1/8/2019  8:18 AM    Acceptance, E,D, NR by SJ at 1/1/2019  2:22 PM                   Point: Body mechanics (In Progress)     Learning Progress Summary           Patient Acceptance, E,D, NR by LS at 1/10/2019  1:50 PM    Acceptance, E,D, VU,NR by LS at 1/8/2019  8:18 AM    Acceptance, E, NR by VG at 1/4/2019  3:04 PM    Comment:  Educated on HEP and correct mechanics for safe functional mobility. Reinforcement needed d/t current cognitive status.    Acceptance, E, NR by VG at 1/3/2019  9:35 AM    Comment:  Educated on HEP and correct mechanics for safe functional mobility. Reinforcement needed d/t impaired safety awareness.    Acceptance, E,D, NR by ILEANA at 1/1/2019  2:22 PM   Family Acceptance, E, NR by VG at 1/4/2019  3:04 PM    Comment:  Educated on HEP and correct mechanics for safe functional mobility. Reinforcement needed d/t current cognitive status.    Acceptance, E, NR by VG at 1/3/2019  9:35 AM    Comment:  Educated on HEP and correct mechanics for safe functional mobility. Reinforcement needed d/t impaired safety awareness.   Significant Other Acceptance, E,D, VU,NR by LS at 1/8/2019  8:18 AM    Acceptance, E,D, NR by ILEANA at 1/1/2019  2:22 PM                   Point: Precautions (In Progress)      Learning Progress Summary           Patient Acceptance, E,D, NR by  at 1/10/2019  1:50 PM    Acceptance, E,D, VU,NR by  at 1/8/2019  8:18 AM    Acceptance, E, NR by  at 1/4/2019  3:04 PM    Comment:  Educated on HEP and correct mechanics for safe functional mobility. Reinforcement needed d/t current cognitive status.    Acceptance, E, NR by VG at 1/3/2019  9:35 AM    Comment:  Educated on HEP and correct mechanics for safe functional mobility. Reinforcement needed d/t impaired safety awareness.    Acceptance, E,D, NR by  at 1/1/2019  2:22 PM   Family Acceptance, E, NR by VG at 1/4/2019  3:04 PM    Comment:  Educated on HEP and correct mechanics for safe functional mobility. Reinforcement needed d/t current cognitive status.    Acceptance, E, NR by VG at 1/3/2019  9:35 AM    Comment:  Educated on HEP and correct mechanics for safe functional mobility. Reinforcement needed d/t impaired safety awareness.   Significant Other Acceptance, E,D, VU,NR by  at 1/8/2019  8:18 AM    Acceptance, E,D, NR by  at 1/1/2019  2:22 PM                               User Key     Initials Effective Dates Name Provider Type Discipline     06/19/15 -  Reta Iqbal, PT Physical Therapist PT     06/19/15 -  Yakelin Alberts, PT Physical Therapist PT     05/29/18 -  Maribeth Lynn, PT Physical Therapist PT                PT Recommendation and Plan  Anticipated Discharge Disposition (PT): skilled nursing facility  Planned Therapy Interventions (PT Eval): balance training, bed mobility training, gait training, home exercise program, patient/family education, strengthening, transfer training  Therapy Frequency (PT Clinical Impression): daily  Outcome Summary/Treatment Plan (PT)  Daily Summary of Progress (PT): progress toward functional goals is gradual  Anticipated Discharge Disposition (PT): skilled nursing facility  Plan of Care Reviewed With: patient, spouse  Progress: improving  Outcome Summary: Pt progressed to STS x4  from EOB; small sidesteps towards HOB with max x2 A. Cont to be limited by significant lethargy; constant cues to attend to task. Will cont PT POC.   Outcome Measures     Row Name 01/10/19 1350 01/09/19 1417 01/08/19 0818       How much help from another person do you currently need...    Turning from your back to your side while in flat bed without using bedrails?  2  -LS  --  2  -LS    Moving from lying on back to sitting on the side of a flat bed without bedrails?  2  -LS  --  2  -LS    Moving to and from a bed to a chair (including a wheelchair)?  2  -LS  --  2  -LS    Standing up from a chair using your arms (e.g., wheelchair, bedside chair)?  2  -LS  --  2  -LS    Climbing 3-5 steps with a railing?  1  -LS  --  1  -LS    To walk in hospital room?  2  -LS  --  1  -LS    AM-PAC 6 Clicks Score  11  -LS  --  10  -LS       How much help from another is currently needed...    Putting on and taking off regular lower body clothing?  --  1  -CL  --    Bathing (including washing, rinsing, and drying)  --  1  -CL  --    Toileting (which includes using toilet bed pan or urinal)  --  1  -CL  --    Putting on and taking off regular upper body clothing  --  1  -CL  --    Taking care of personal grooming (such as brushing teeth)  --  1  -CL  --    Eating meals  --  1  -CL  --    Score  --  6  -CL  --       Functional Assessment    Outcome Measure Options  AM-PAC 6 Clicks Basic Mobility (PT)  -LS  AM-PAC 6 Clicks Daily Activity (OT)  -CL  AM-PAC 6 Clicks Basic Mobility (PT)  -LS      User Key  (r) = Recorded By, (t) = Taken By, (c) = Cosigned By    Initials Name Provider Type    Yakelin Pop, PT Physical Therapist    CL Martha Blackwood, OT Occupational Therapist         Time Calculation:   PT Charges     Row Name 01/10/19 1350             Time Calculation    Start Time  1350  -      PT Received On  01/10/19  -         Time Calculation- PT    Total Timed Code Minutes- PT  23 minute(s)  -         Timed Charges    64435 -  PT Therapeutic Exercise Minutes  5  -LS      93169 - PT Therapeutic Activity Minutes  18  -LS        User Key  (r) = Recorded By, (t) = Taken By, (c) = Cosigned By    Initials Name Provider Type    LS Yakelin Alberts, PT Physical Therapist        Therapy Suggested Charges     Code   Minutes Charges    73750 (CPT®) Hc Pt Neuromusc Re Education Ea 15 Min      47063 (CPT®) Hc Pt Ther Proc Ea 15 Min 5 1    85609 (CPT®) Hc Gait Training Ea 15 Min      60836 (CPT®) Hc Pt Therapeutic Act Ea 15 Min 18 1    75291 (CPT®) Hc Pt Manual Therapy Ea 15 Min      90632 (CPT®) Hc Pt Iontophoresis Ea 15 Min      02830 (CPT®) Hc Pt Elec Stim Ea-Per 15 Min      05884 (CPT®) Hc Pt Ultrasound Ea 15 Min      95198 (CPT®) Hc Pt Self Care/Mgmt/Train Ea 15 Min      89169 (CPT®) Hc Pt Prosthetic (S) Train Initial Encounter, Each 15 Min      04105 (CPT®) Hc Pt Orthotic(S)/Prosthetic(S) Encounter, Each 15 Min      66651 (CPT®) Hc Orthotic(S) Mgmt/Train Initial Encounter, Each 15min      Total  23 2        Therapy Charges for Today     Code Description Service Date Service Provider Modifiers Qty    30407687420 HC PT THERAPEUTIC ACT EA 15 MIN 1/10/2019 Yakelin Alberts, PT GP 1    87006667766 HC PT THER PROC EA 15 MIN 1/10/2019 Yakelin Alberts, PT GP 1    03674111660 HC PT THER SUPP EA 15 MIN 1/10/2019 Yakelin Alberts, PT GP 2          PT G-Codes  Outcome Measure Options: AM-PAC 6 Clicks Basic Mobility (PT)  AM-PAC 6 Clicks Score: 11  Score: 6    Yakelin Alberts, PT  1/10/2019

## 2019-01-10 NOTE — PLAN OF CARE
Problem: Patient Care Overview  Goal: Plan of Care Review  Outcome: Ongoing (interventions implemented as appropriate)   01/10/19 1350   OTHER   Outcome Summary Pt progressed to STS x4 from EOB; small sidesteps towards HOB with max x2 A. Cont to be limited by significant lethargy; constant cues to attend to task. Will cont PT POC.    Coping/Psychosocial   Plan of Care Reviewed With patient;spouse   Plan of Care Review   Progress improving

## 2019-01-10 NOTE — PROGRESS NOTES
Pharmacy Parenteral Nutrition Evaluation  Todd Phillips is a  68 y.o. male receiving TPN.     Indication: Necrotizing enterocolitis  Consulting Physician:  Dr Brody    Labs  Results from last 7 days   Lab Units  01/10/19   0521  01/09/19   0417  01/08/19   2028  01/08/19   0311   SODIUM mmol/L  129*  131*   --   131*   POTASSIUM mmol/L  3.7  3.7  3.4*  3.3*   CHLORIDE mmol/L  101  102   --   101   CO2 mmol/L  23.0  22.0   --   23.0   BUN mg/dL  27*  25*   --   28*   CREATININE mg/dL  0.81  0.92   --   0.88   CALCIUM mg/dL  7.9*  8.0*   --   8.1*   BILIRUBIN mg/dL  0.8  0.8   --   0.8   ALK PHOS U/L  223*  188*   --   181*   ALT (SGPT) U/L  45*  35   --   28   AST (SGOT) U/L  58*  46*   --   43*   GLUCOSE mg/dL  208*  246*   --   230*     Results from last 7 days   Lab Units  01/10/19   0521  01/09/19   0417  01/08/19   0311   01/06/19   1406   MAGNESIUM mg/dL  2.1  1.9  1.8   < >   --    PHOSPHORUS mg/dL  3.6  3.4  3.0   < >   --    PREALBUMIN mg/dL   --    --    --    --   <5.0*    < > = values in this interval not displayed.     Results from last 7 days   Lab Units  01/10/19   0521  01/08/19   0311  01/07/19   0750   WBC 10*3/mm3  16.22*  20.45*  23.91*   HEMOGLOBIN g/dL  8.6*  7.9*  8.7*   HEMATOCRIT %  26.4*  23.4*  25.8*   PLATELETS 10*3/mm3  311  297  297     Triglycerides   Date Value Ref Range Status   01/07/2019 133 0 - 150 mg/dL Final     estimated creatinine clearance is 104.9 mL/min (by C-G formula based on SCr of 0.81 mg/dL).    Intake & Output (last 3 days)       01/07 0701 - 01/08 0700 01/08 0701 - 01/09 0700 01/09 0701 - 01/10 0700 01/10 0701 - 01/11 0700    I.V. (mL/kg) 1030 (11.3) 627 (6.9) 733.8 (8)     Other  255 300     NG/GT 90       IV Piggyback 457.6 900 469.2     TPN 1878.6 2013 2006.4     Total Intake(mL/kg) 3456.2 (37.8) 3795 (41.5) 3509.4 (38.4)     Urine (mL/kg/hr) 1475 (0.7) 1525 (0.7) 1450 (0.7)     Emesis/NG output 700 275      Drains        Stool 0 0 0     Wound 0       Total  Output 2175 1800 1450     Net +1281.2 +1995 +2059.4             Stool Unmeasured Occurrence 1 x 4 x 2 x 1 x          Dietitian Recommendations     Current TPN Regimen Recommendation:  Dextrose 20% / Amino Acid 7% at goal rate of 75mL/hr.  20% SMOF Lipid Emulsion 200mL every 24 hours.    Assessment/Plan:  1. Continuing TPN for necrotizing enterocolitis. Macronutrients per dietary recommendation as listed above. Goal rate of 75mL/hr.  2. Will continue with standard electrolytes and 1:1 acid base. Electrolyte replacements and sliding scale insulin are available. Continue close monitoring of blood glucose, insulin increased again today.  3. Pharmacy will continue to follow and adjust as indicated.     Thanks,   Dhara Brooks, PharmD  Pharmacy Resident  1/10/2019  10:03 AM

## 2019-01-10 NOTE — PROGRESS NOTES
Pulmonary/Critical Care Follow-up     LOS: 10 days   Patient Care Team:  Adiel Denney MD as PCP - General  Loida Campbell APRN as Nurse Practitioner (Nurse Practitioner)    Chief Complaint / reason : Pancreatic cancer / medical management of post-operative conditions including diabetes, atrial fibrillation.        Subjective    68-year-old male with a history of biliary tract obstruction who underwent cholecystectomy last August with subsequent ERCP for biliary stricture with stent placement.  He subsequently had an FNA on September 28 which was suspicious for adenocarcinoma in the head of the pancreas.  He had a repeat ERCP on 10/1/2018 and a new stent was placed.  He has been treated multiple times for recurrent bacteremias associated with his biliary obstruction.  He was subsequently treated with neoadjuvant chemotherapy.    The patient underwent a Whipple procedure with wedge biopsy of liver tumor and portal vein resection/lateral repair with gastrojejunostomy tube placement by Dr. Brody on December 31, 2018.  He is transferred to the intensive care unit on January 5 with increasing confusion and tachypnea and a CT scan showing portal venous gas, pneumatosis/ileus and intra-abdominal fluid.    Interval History:   Patient remains drowsy today.  Ongoing low-grade fevers.  Occasional cough.  Ongoing tremor.  Poor concentration with confusion today.  Family at bedside.  He had a CT abdomen pelvis today.    History taken from: Patient, staff.    PMH/FH/Social History were reviewed and updated appropriately in the electronic medical record.     Review of Systems:    Review of 14 systems was completed with positives and pertinent negatives noted in the subjective section.  All other systems reviewed and are negative.         Objective     Vital Signs  Temp:  [98.7 °F (37.1 °C)-100.8 °F (38.2 °C)] 100.8 °F (38.2 °C)  Heart Rate:  [] 87  Resp:  [20-26] 26  BP: (128-164)/() 146/85  01/09  0701 - 01/10 0700  In: 3509.4 [I.V.:733.8]  Out: 1450 [Urine:1450]  Body mass index is 30.44 kg/m².     Physical Exam:     Constitutional:   Drowsy, cooperative, in no acute distress   Head:   Normocephalic, without obvious abnormality, atraumatic   Eyes:           Lids and lashes normal, conjunctivae and sclerae normal.  No icterus, no pallor, corneas clear, PER   ENMT:  Ears appear intact with no abnormalities noted     No oral lesions, no thrush, oral mucosa moist   Neck:  No adenopathy, supple, trachea midline, no thyromegaly, no JVD   Lungs/Resp:    Normal effort, symmetric chest rise, no crepitus, clear to      auscultation bilaterally, no chest wall tenderness                Heart/CV:   Regular rhythm and normal rate, normal S1 and S2, no            murmur   Abdomen/GI:    Positive bowel sounds, midline wound with staples intact, no masses, no organomegaly, soft, mildly tender, right drain site with minimal serous drainage, non-distended   :    Deferred   Extremities/MSK:  No clubbing or cyanosis.  No edema.  Normal tone.    No deformities.    Pulses:  Pulses palpable and equal bilaterally   Skin:  No bleeding, bruising or rash   Heme/Lymph:  No cervical or supraclavicular adenopathy.   Neurologic:      Psychiatric:    Moves all extremities with no obvious focal motor deficit.  Cranial nerves 2 - 12 grossly intact, positive asterixis.  Concentration poor.  Drowsy, normal affect.      Results Review:     I reviewed the patient's new clinical results.   Results from last 7 days   Lab Units  01/10/19   0521  01/09/19   0417  01/08/19 2028 01/08/19   0311   SODIUM mmol/L  129*  131*   --   131*   POTASSIUM mmol/L  3.7  3.7  3.4*  3.3*   CHLORIDE mmol/L  101  102   --   101   CO2 mmol/L  23.0  22.0   --   23.0   BUN mg/dL  27*  25*   --   28*   CREATININE mg/dL  0.81  0.92   --   0.88   CALCIUM mg/dL  7.9*  8.0*   --   8.1*   BILIRUBIN mg/dL  0.8  0.8   --   0.8   ALK PHOS U/L  223*  188*   --   181*   ALT  (SGPT) U/L  45*  35   --   28   AST (SGOT) U/L  58*  46*   --   43*   GLUCOSE mg/dL  208*  246*   --   230*     Results from last 7 days   Lab Units  01/10/19   0521  01/08/19   0311  01/07/19   0750  01/06/19   0506   WBC 10*3/mm3  16.22*  20.45*  23.91*  18.36*   HEMOGLOBIN g/dL  8.6*  7.9*  8.7*  9.0*   HEMATOCRIT %  26.4*  23.4*  25.8*  27.1*   PLATELETS 10*3/mm3  311  297  297  279   MONOCYTES % %   --    --    --   5.0     Results from last 7 days   Lab Units  01/10/19   1634   PH, ARTERIAL pH units  7.519*   PO2 ART mm Hg  76.8*   PCO2, ARTERIAL mm Hg  28.5   HCO3 ART mmol/L  23.2     Results from last 7 days   Lab Units  01/10/19   0521  01/09/19   0417  01/08/19   0311   MAGNESIUM mg/dL  2.1  1.9  1.8   PHOSPHORUS mg/dL  3.6  3.4  3.0   procalcitonin 1/6: 6.71 -> 1/7: 3.52.         I reviewed the patient's new imaging including images and reports.  CXR 1/6/18:  IMPRESSION:  Low lung volumes with increased markings at the lung bases  Bilaterally.    CT Abd/pelvis 1/5/19:  IMPRESSION:  There are small bilateral pleural effusions. There is a  fluid collection seen surrounding the liver and spleen containing air in  the fluid collection around the liver. There is air seen scattered  throughout the abdomen which may be postoperative. There is air  identified within the mesenteric vessels with findings suggesting  pneumatosis throughout scattered small bowel loops within the mid  abdomen. Fluid throughout the colon suggesting clinical presentation of  diarrhea. Gastrostomy tube is in satisfactory position.      Medication Review:     budesonide 0.5 mg Nebulization BID - RT   diatrizoate meglumine-sodium      enoxaparin 1 mg/kg Subcutaneous Q12H   Fat Emulsion Fish Oil Based 200 mL Intravenous Q24H   flecainide 50 mg Oral Q12H   insulin regular 0-9 Units Subcutaneous Q6H   insulin regular 10 Units Subcutaneous Q6H   ipratropium-albuterol 3 mL Nebulization 4x Daily - RT   Linezolid 600 mg Intravenous Q12H    meropenem 1 g Intravenous Q8H   metoprolol tartrate 25 mg Oral Q12H   micafungin (MYCAMINE)  mg Intravenous Q24H   pantoprazole 40 mg Intravenous Q AM   Scopolamine 1 patch Transdermal Q72H   sodium chloride 10 mL Intravenous Q12H   water for contrast          Adult Central 2-in-1 TPN  Last Rate: 75 mL/hr at 01/09/19 1817   Adult Central 2-in-1 TPN     Pharmacy to Dose TPN         Assessment/Plan       Malignant neoplasm of head of pancreas (CMS/HCC)    Hyperlipidemia    COPD (chronic obstructive pulmonary disease) (CMS/HCC)    Essential hypertension    Gastroesophageal reflux disease without esophagitis    DM2 (diabetes mellitus, type 2) (CMS/HCC)    Paroxysmal atrial fibrillation    Chronic anticoagulation, Eliquis    Post-operative confusion and somnolence, likely due to oversedation    Atrial fibrillation with RVR (CMS/HCC)    Adult necrotizing enterocolitis (CMS/HCC)    Encephalopathy    69 YO with h/o DMII, CKD3, GERD, PAF on Eliquis, locally advanced pancreatic cancer with zulay-adjuvant chemotherapy admitted post Pancreaticoduodenectomy, wedge biopsy of liver, portal vein resection and lateral repair and GJ tube by Dr. Brody on 12/31/18.      Moved to ICU on 1/5/18 due to concerns of breathing difficulty, encephalopathy and findings of pneumatosis, portal venous gas and ileus on CT abdomen/pelvis and concern for sepsis.      Patient's condition had been improving however over the last few days has become more somnolent and confused.    Plan:  1. Lovenox for anticoagulation for atrial fibrilation.  Eventually transition back to Eliquis when okay with surgery.   2. Antibiotics per ID.   3. Increase scheduled regular insulin.  Continue correction insulin.   4. Continue TPN.   5. Continue flecainide.   6.  Continue scheduled metoprolol.   7. Judicious use of narcotics for pain management.    8. PT/OT.  9. Monitor and replace electrolytes.   10.  Check arterial blood gas (done).   11.  Follow-up CT  abdomen and pelvis.       Gerardo Messina MD  01/10/19  5:48 PM    *. Please note that portions of this note were completed with a voice recognition program. Efforts were made to edit the dictations, but occasionally words are mistranscribed.

## 2019-01-10 NOTE — PROGRESS NOTES
Multidisciplinary Rounds    Time: 20min  Patient Name: Todd Phillips  Date of Encounter: 01/10/19 12:04 PM  MRN: [id number]  Admission date: 12/31/2018      Reason for visit: MDR. RD to continue to follow per protocol.     Additional information obtained during MDR: RN reports that pt is still not taking in clear liquids. RN states pt has not been alert enough for PO intake, RN to assess today if pt is safe for clear liquid intake today. Pt continues on PN, with adequate delivery over the past 24 hrs. Per I&O- 2 bowel movements over the past 24 hrs. Plan for CT abdomen and pelvis today. Insulin being adjusted per intensivist.     Current diet: Clear liquid  PN: D20%, 7.0%AA at 75 ml/hr at 75 ml/hr with 200 ml smoflipid q 24 hrs via PICC      Intervention:  Follow treatment plan  Care plan reviewed    Follow up:   Per protocol      Arina Morelos, MS RD/ALTA CNSC  12:04 PM

## 2019-01-10 NOTE — PROGRESS NOTES
Continued Stay Note  [facility]     Patient Name: Todd Phillips  MRN: [id number]  Today's Date: 1/10/2019    Admit Date: 12/31/2018    Discharge Plan     Row Name 01/10/19 1127       Plan    Plan  LTACH    Plan Comments  Talked to Mrs. Phillips about possible transfer to /[facility] hospital to continue IV antibiotics and TPN.  She was in agreement.  Called Dr. Brody and he L/TACH would be good d/c plan.  Mrs. Phillips would like to go to [facility].      Final Discharge Disposition Code  63 - [facility]CH        Discharge Codes    No documentation.       Expected Discharge Date and Time     Expected Discharge Date Expected Discharge Time    Jan 12, 2019             Lico Cadena RN

## 2019-01-11 ENCOUNTER — DOCUMENTATION (OUTPATIENT)
Dept: OTHER | Facility: HOSPITAL | Age: 69
End: 2019-01-11

## 2019-01-11 ENCOUNTER — APPOINTMENT (OUTPATIENT)
Dept: CT IMAGING | Facility: HOSPITAL | Age: 69
End: 2019-01-11

## 2019-01-11 LAB
AMYLASE FLD-CCNC: >4500 U/L
ANION GAP SERPL CALCULATED.3IONS-SCNC: 7 MMOL/L (ref 3–11)
APTT PPP: 28.2 SECONDS (ref 24–31)
BUN BLD-MCNC: 28 MG/DL (ref 9–23)
BUN/CREAT SERPL: 35 (ref 7–25)
CALCIUM SPEC-SCNC: 7.5 MG/DL (ref 8.7–10.4)
CHLORIDE SERPL-SCNC: 101 MMOL/L (ref 99–109)
CO2 SERPL-SCNC: 24 MMOL/L (ref 20–31)
CREAT BLD-MCNC: 0.8 MG/DL (ref 0.6–1.3)
DEPRECATED RDW RBC AUTO: 55.5 FL (ref 37–54)
ERYTHROCYTE [DISTWIDTH] IN BLOOD BY AUTOMATED COUNT: 17.1 % (ref 11.3–14.5)
GFR SERPL CREATININE-BSD FRML MDRD: 96 ML/MIN/1.73
GLUCOSE BLD-MCNC: 130 MG/DL (ref 70–100)
GLUCOSE BLDC GLUCOMTR-MCNC: 139 MG/DL (ref 70–130)
GLUCOSE BLDC GLUCOMTR-MCNC: 146 MG/DL (ref 70–130)
GLUCOSE BLDC GLUCOMTR-MCNC: 204 MG/DL (ref 70–130)
GLUCOSE BLDC GLUCOMTR-MCNC: 209 MG/DL (ref 70–130)
HCT VFR BLD AUTO: 24 % (ref 38.9–50.9)
HGB BLD-MCNC: 7.8 G/DL (ref 13.1–17.5)
INR PPP: 1.05 (ref 0.91–1.09)
MAGNESIUM SERPL-MCNC: 1.8 MG/DL (ref 1.3–2.7)
MCH RBC QN AUTO: 29.4 PG (ref 27–31)
MCHC RBC AUTO-ENTMCNC: 32.5 G/DL (ref 32–36)
MCV RBC AUTO: 90.6 FL (ref 80–99)
PHOSPHATE SERPL-MCNC: 3.6 MG/DL (ref 2.4–5.1)
PLATELET # BLD AUTO: 342 10*3/MM3 (ref 150–450)
PMV BLD AUTO: 10.5 FL (ref 6–12)
POTASSIUM BLD-SCNC: 4.2 MMOL/L (ref 3.5–5.5)
PROTHROMBIN TIME: 11 SECONDS (ref 9.6–11.5)
RBC # BLD AUTO: 2.65 10*6/MM3 (ref 4.2–5.76)
SODIUM BLD-SCNC: 132 MMOL/L (ref 132–146)
WBC NRBC COR # BLD: 13.86 10*3/MM3 (ref 3.5–10.8)

## 2019-01-11 PROCEDURE — 87070 CULTURE OTHR SPECIMN AEROBIC: CPT | Performed by: SURGERY

## 2019-01-11 PROCEDURE — 82247 BILIRUBIN TOTAL: CPT | Performed by: SURGERY

## 2019-01-11 PROCEDURE — 84100 ASSAY OF PHOSPHORUS: CPT

## 2019-01-11 PROCEDURE — 25010000002 MAGNESIUM SULFATE PER 500 MG OF MAGNESIUM

## 2019-01-11 PROCEDURE — 87102 FUNGUS ISOLATION CULTURE: CPT | Performed by: SURGERY

## 2019-01-11 PROCEDURE — 94799 UNLISTED PULMONARY SVC/PX: CPT

## 2019-01-11 PROCEDURE — 87077 CULTURE AEROBIC IDENTIFY: CPT | Performed by: SURGERY

## 2019-01-11 PROCEDURE — C1729 CATH, DRAINAGE: HCPCS

## 2019-01-11 PROCEDURE — 25010000002 MEROPENEM

## 2019-01-11 PROCEDURE — 80048 BASIC METABOLIC PNL TOTAL CA: CPT

## 2019-01-11 PROCEDURE — 82962 GLUCOSE BLOOD TEST: CPT

## 2019-01-11 PROCEDURE — 25010000002 MEROPENEM: Performed by: INTERNAL MEDICINE

## 2019-01-11 PROCEDURE — 75989 ABSCESS DRAINAGE UNDER X-RAY: CPT

## 2019-01-11 PROCEDURE — 87015 SPECIMEN INFECT AGNT CONCNTJ: CPT | Performed by: SURGERY

## 2019-01-11 PROCEDURE — 87205 SMEAR GRAM STAIN: CPT | Performed by: SURGERY

## 2019-01-11 PROCEDURE — 25010000002 ENOXAPARIN PER 10 MG: Performed by: INTERNAL MEDICINE

## 2019-01-11 PROCEDURE — 36415 COLL VENOUS BLD VENIPUNCTURE: CPT | Performed by: RADIOLOGY

## 2019-01-11 PROCEDURE — 87106 FUNGI IDENTIFICATION YEAST: CPT | Performed by: SURGERY

## 2019-01-11 PROCEDURE — 0W9G3ZX DRAINAGE OF PERITONEAL CAVITY, PERCUTANEOUS APPROACH, DIAGNOSTIC: ICD-10-PCS | Performed by: SURGERY

## 2019-01-11 PROCEDURE — 25010000002 HYDROMORPHONE PER 4 MG: Performed by: INTERNAL MEDICINE

## 2019-01-11 PROCEDURE — 25010000002 CALCIUM GLUCONATE PER 10 ML

## 2019-01-11 PROCEDURE — 87186 SC STD MICRODIL/AGAR DIL: CPT | Performed by: SURGERY

## 2019-01-11 PROCEDURE — 25010000002 LINEZOLID 600 MG/300ML SOLUTION: Performed by: INTERNAL MEDICINE

## 2019-01-11 PROCEDURE — 85730 THROMBOPLASTIN TIME PARTIAL: CPT | Performed by: RADIOLOGY

## 2019-01-11 PROCEDURE — 99233 SBSQ HOSP IP/OBS HIGH 50: CPT | Performed by: INTERNAL MEDICINE

## 2019-01-11 PROCEDURE — 97530 THERAPEUTIC ACTIVITIES: CPT

## 2019-01-11 PROCEDURE — 88305 TISSUE EXAM BY PATHOLOGIST: CPT | Performed by: SURGERY

## 2019-01-11 PROCEDURE — 82150 ASSAY OF AMYLASE: CPT | Performed by: SURGERY

## 2019-01-11 PROCEDURE — 88112 CYTOPATH CELL ENHANCE TECH: CPT | Performed by: SURGERY

## 2019-01-11 PROCEDURE — 83735 ASSAY OF MAGNESIUM: CPT

## 2019-01-11 PROCEDURE — 94640 AIRWAY INHALATION TREATMENT: CPT

## 2019-01-11 PROCEDURE — 25010000002 POTASSIUM CHLORIDE PER 2 MEQ OF POTASSIUM

## 2019-01-11 PROCEDURE — 85610 PROTHROMBIN TIME: CPT | Performed by: RADIOLOGY

## 2019-01-11 PROCEDURE — 87075 CULTR BACTERIA EXCEPT BLOOD: CPT | Performed by: INTERNAL MEDICINE

## 2019-01-11 PROCEDURE — 85027 COMPLETE CBC AUTOMATED: CPT | Performed by: INTERNAL MEDICINE

## 2019-01-11 RX ADMIN — MEROPENEM 1 G: 1 INJECTION, POWDER, FOR SOLUTION INTRAVENOUS at 16:17

## 2019-01-11 RX ADMIN — INSULIN HUMAN 4 UNITS: 100 INJECTION, SOLUTION PARENTERAL at 00:03

## 2019-01-11 RX ADMIN — IPRATROPIUM BROMIDE AND ALBUTEROL SULFATE 3 ML: 2.5; .5 SOLUTION RESPIRATORY (INHALATION) at 19:32

## 2019-01-11 RX ADMIN — METOPROLOL TARTRATE 25 MG: 25 TABLET ORAL at 08:27

## 2019-01-11 RX ADMIN — MICAFUNGIN SODIUM 100 MG: 20 INJECTION, POWDER, LYOPHILIZED, FOR SOLUTION INTRAVENOUS at 20:05

## 2019-01-11 RX ADMIN — LINEZOLID 600 MG: 600 INJECTION, SOLUTION INTRAVENOUS at 21:09

## 2019-01-11 RX ADMIN — INSULIN HUMAN 4 UNITS: 100 INJECTION, SOLUTION PARENTERAL at 12:12

## 2019-01-11 RX ADMIN — HYDROMORPHONE HYDROCHLORIDE 0.5 MG: 1 INJECTION, SOLUTION INTRAMUSCULAR; INTRAVENOUS; SUBCUTANEOUS at 20:04

## 2019-01-11 RX ADMIN — LINEZOLID 600 MG: 600 INJECTION, SOLUTION INTRAVENOUS at 08:30

## 2019-01-11 RX ADMIN — SMOFLIPID 100 ML: 6; 6; 5; 3 INJECTION, EMULSION INTRAVENOUS at 17:52

## 2019-01-11 RX ADMIN — PANTOPRAZOLE SODIUM 40 MG: 40 INJECTION, POWDER, FOR SOLUTION INTRAVENOUS at 05:42

## 2019-01-11 RX ADMIN — FLECAINIDE ACETATE 50 MG: 50 TABLET ORAL at 21:09

## 2019-01-11 RX ADMIN — HYDROMORPHONE HYDROCHLORIDE 0.5 MG: 1 INJECTION, SOLUTION INTRAMUSCULAR; INTRAVENOUS; SUBCUTANEOUS at 02:51

## 2019-01-11 RX ADMIN — MAGNESIUM SULFATE HEPTAHYDRATE 4 G: 40 INJECTION, SOLUTION INTRAVENOUS at 06:11

## 2019-01-11 RX ADMIN — HYDROMORPHONE HYDROCHLORIDE 0.5 MG: 1 INJECTION, SOLUTION INTRAMUSCULAR; INTRAVENOUS; SUBCUTANEOUS at 14:47

## 2019-01-11 RX ADMIN — IPRATROPIUM BROMIDE AND ALBUTEROL SULFATE 3 ML: 2.5; .5 SOLUTION RESPIRATORY (INHALATION) at 07:56

## 2019-01-11 RX ADMIN — BUDESONIDE 0.5 MG: 0.5 INHALANT RESPIRATORY (INHALATION) at 07:56

## 2019-01-11 RX ADMIN — BUDESONIDE 0.5 MG: 0.5 INHALANT RESPIRATORY (INHALATION) at 19:32

## 2019-01-11 RX ADMIN — METOPROLOL TARTRATE 25 MG: 25 TABLET ORAL at 21:09

## 2019-01-11 RX ADMIN — IPRATROPIUM BROMIDE AND ALBUTEROL SULFATE 3 ML: 2.5; .5 SOLUTION RESPIRATORY (INHALATION) at 12:02

## 2019-01-11 RX ADMIN — ENOXAPARIN SODIUM 90 MG: 100 INJECTION SUBCUTANEOUS at 21:08

## 2019-01-11 RX ADMIN — MEROPENEM 1 G: 1 INJECTION, POWDER, FOR SOLUTION INTRAVENOUS at 00:00

## 2019-01-11 RX ADMIN — FLECAINIDE ACETATE 50 MG: 50 TABLET ORAL at 08:27

## 2019-01-11 RX ADMIN — MEROPENEM 1 G: 1 INJECTION, POWDER, FOR SOLUTION INTRAVENOUS at 08:14

## 2019-01-11 RX ADMIN — IPRATROPIUM BROMIDE AND ALBUTEROL SULFATE 3 ML: 2.5; .5 SOLUTION RESPIRATORY (INHALATION) at 15:45

## 2019-01-11 RX ADMIN — SMOFLIPID 100 ML: 6; 6; 5; 3 INJECTION, EMULSION INTRAVENOUS at 23:33

## 2019-01-11 RX ADMIN — SODIUM CHLORIDE, PRESERVATIVE FREE 10 ML: 5 INJECTION INTRAVENOUS at 21:09

## 2019-01-11 RX ADMIN — SODIUM CHLORIDE, PRESERVATIVE FREE 10 ML: 5 INJECTION INTRAVENOUS at 08:29

## 2019-01-11 RX ADMIN — CALCIUM GLUCONATE: 94 INJECTION, SOLUTION INTRAVENOUS at 17:52

## 2019-01-11 NOTE — PLAN OF CARE
Problem: Patient Care Overview  Goal: Plan of Care Review  Outcome: Ongoing (interventions implemented as appropriate)   01/11/19 0425   OTHER   Outcome Summary Patient VSS this shift with the exception of continued low-grade fever throughout the shift, TMAX 100.4. He has had minimal verbalization, able to respond appropriately to some yes/no questions and make simple requests such as pain or position. Abdomen continues to be distended, firm, and tender to touch per patient response. c/o's of intense pain with movement and activity. G-tube connected to low-wall suction due to c/o's of nausea and noted belching last night. Minimal output this sift of cloudy, green drainage. Abdominal incision LAITH with staples, well-approximated. MADDY site with ostomy device with no output this shift. Patient has been restless and moaning and was given a PRN dose of Dilaudid early on this shift and again this morning per his request. Neurologic status has been unchanged from prior shift. Son at bedside. Plan for CT guided peritoneal draining in AM, consent obtained and on chart. Will continue to monitor.    Coping/Psychosocial   Plan of Care Reviewed With spouse;patient;son   Plan of Care Review   Progress no change       Problem: Fall Risk (Adult)  Goal: Absence of Fall  Outcome: Ongoing (interventions implemented as appropriate)   01/11/19 0425   Fall Risk (Adult)   Absence of Fall making progress toward outcome       Problem: Skin Injury Risk (Adult)  Goal: Skin Health and Integrity  Outcome: Ongoing (interventions implemented as appropriate)   01/11/19 0425   Skin Injury Risk (Adult)   Skin Health and Integrity making progress toward outcome       Problem: Pain, Acute (Adult)  Goal: Acceptable Pain Control/Comfort Level  Outcome: Ongoing (interventions implemented as appropriate)   01/11/19 0425   Pain, Acute (Adult)   Acceptable Pain Control/Comfort Level making progress toward outcome

## 2019-01-11 NOTE — PROGRESS NOTES
"General Surgery Daily Progress Note    01/10/19    Todd Phillips  [id number]  1950    10 Days Post-Op    Reason for Evaluation: Locally advanced pancreatic cancer  Subjective:  Delirium persists    Objective:  /87   Pulse 92   Temp (!) 100.9 °F (38.3 °C) (Core)   Resp 22   Ht 182.9 cm (72\")   Wt 102 kg (224 lb 6.9 oz)   SpO2 94%   BMI 30.44 kg/m²       INTAKE/OUTPUT      Intake/Output Summary (Last 24 hours) at 1/10/2019 1938  Last data filed at 1/10/2019 1800  Gross per 24 hour   Intake 3439.7 ml   Output 1400 ml   Net 2039.7 ml       General Appearance: Somnolent but no distress  Eyes: Anicteric  Neck: Trachea midline   Cardiovascular:  RRR  Lungs:  Bilateral respirations unlabored   Abdomen: Left abdominal fullness compared to the right.  Drains all intact  Extremities:  No cyanosis or edema   Skin:  No Jaundice  Neurologic: awake and conversant   Surgical Site: No midline infection appreciated      Imaging Results (last 24 hours)     Procedure Component Value Units Date/Time    CT Abdomen Pelvis With & Without Contrast [[id number]] Collected:  01/10/19 1705     Updated:  01/10/19 1705    Narrative:       EXAMINATION: CT ABDOMEN/PELVIS WWO CONTRAST - 1/10/2019     INDICATION: Z01.89-Encounter for other specified special examinations;  C25.9-Malignant neoplasm of pancreas, unspecified; Z74.09-Other reduced  mobility.     TECHNIQUE: 5 mm post oral contrast images through the abdomen and pelvis  with subsequent post-IV contrast portal venous phase and delayed venous  phase images.     The radiation dose reduction device was turned on for each scan per the  ALARA (As Low as Reasonably Achievable) protocol.     COMPARISON: 1/5/2019 abdomen and pelvis CT scan.     FINDINGS: Since the previous exam, the patient's subcapsular fluid  collection has significantly further enlarged, approximately 21 x 10 cm  near the dome of the liver and 24 x 5.7 cm more inferiorly. Fluid  collection extends around the " medial margin of the left lobe as well.  There are multiple air bubbles along the margins of the collection and  internal to it indicating possible multilocular or thick/viscous  collection. There is considerable mass effect on the liver. No intrinsic  liver parenchymal lesion is seen. There is a small amount of residual  free air expected for recent surgery. Jejunostomy tube is again noted,  in expected intraluminal position. Small bowel loops appear grossly  normal. There is only a small amount of free ascites. No gross  abnormalities are noted of the spleen, adrenal glands, or kidneys.  Gallbladder is resected. There is no evidence of bowel obstruction.     Regarding the lower abdomen and pelvis, mild ascites is seen along the  paracolic gutters and in the pelvis. The bladder is decompressed by a  Hassan balloon catheter. Colon is nondistended.     Included images of the lung bases show small effusions and mild  bibasilar atelectasis, a little increased from the prior study.       Impression:       1. Very large increasing subcapsular collection of the liver, presumably  enlarging biloma. Infected biloma cannot be excluded. Mildly increased  ascites compared to 1/5/2019 exam.   2. Previously noted pneumatosis has resolved. Small bowel now appears  practically normal.  3. Mild to moderate bilateral lower lobe atelectasis and very small  effusions.     DICTATED:   1/10/2019  EDITED/ls :   1/10/2019        XR Chest 1 View [[id number]] Collected:  01/09/19 1955     Updated:  01/10/19 0750    Narrative:       EXAMINATION: XR CHEST 1 VW-      INDICATION: Cough; Z74.09-Other reduced mobility; Z01.89-Encounter for  other specified special examinations; C25.9-Malignant neoplasm of  pancreas, unspecified.     COMPARISON: 01/06/2019.     FINDINGS: Right-sided Port-A-Cath is again noted. Left upper extremity  PICC line is now seen with its tip in the mid SVC. The heart is  borderline enlarged. The vasculature appears normal.  Lung volumes are  relatively low. There is minimal bibasilar discoid atelectasis. Lungs  otherwise appear clear.           Impression:       Low lung volumes and minimal bibasilar discoid atelectasis.  PICC line tip in the mid SVC.     D:  01/09/2019  E:  01/10/2019             Labs:  Lab Results (last 24 hours)     Procedure Component Value Units Date/Time    Blood Culture - Blood, Arm, Left [[id number]] Collected:  01/05/19 1759    Specimen:  Blood from Arm, Left Updated:  01/10/19 1900     Blood Culture No growth at 5 days    Narrative:       Aerobic bottle only    Blood Culture - Blood, Arm, Left [[id number]] Collected:  01/05/19 1830    Specimen:  Blood from Arm, Left Updated:  01/10/19 1900     Blood Culture No growth at 5 days    POC Glucose Once [[id number]]  (Abnormal) Collected:  01/10/19 1742    Specimen:  Blood Updated:  01/10/19 1744     Glucose 151 mg/dL     Blood Gas, Arterial With Co-Ox [[id number]]  (Abnormal) Collected:  01/10/19 1634    Specimen:  Arterial Blood Updated:  01/10/19 1648     Site Right Radial     Dameon's Test Positive     pH, Arterial 7.519 pH units      Comment: 83 Value above reference range        pCO2, Arterial 28.5 mm Hg      Comment: 84 Value below reference range        pO2, Arterial 76.8 mm Hg      Comment: 84 Value below reference range        HCO3, Arterial 23.2 mmol/L      Base Excess, Arterial 0.6 mmol/L      Hemoglobin, Blood Gas 8.0 g/dL      Comment: 84 Value below reference range        Hematocrit, Blood Gas 24.5 %      Oxyhemoglobin 95.3 %      Methemoglobin 0.40 %      Carboxyhemoglobin 1.8 %      CO2 Content 24.1 mmol/L      Temperature 37.0 C      Barometric Pressure for Blood Gas -- mmHg      Comment: N/A        Modality Nasal Cannula     FIO2 31 %      Ventilator Mode       Comment: Meter: [id number]     :  [id number]        Note --     pH, Temp Corrected 7.519 pH Units      pCO2, Temperature Corrected 28.5 mm Hg      pO2, Temperature Corrected 76.8 mm  Hg     POC Glucose Once [[id number]]  (Abnormal) Collected:  01/10/19 1131    Specimen:  Blood Updated:  01/10/19 1135     Glucose 270 mg/dL     Blood Culture - Blood, Arm, Right [[id number]] Collected:  01/05/19 1021    Specimen:  Blood from Arm, Right Updated:  01/10/19 1130     Blood Culture No growth at 5 days    Blood Culture - Blood, Arm, Right [[id number]] Collected:  01/05/19 1013    Specimen:  Blood from Arm, Right Updated:  01/10/19 1130     Blood Culture No growth at 5 days    Comprehensive Metabolic Panel [[id number]]  (Abnormal) Collected:  01/10/19 0521    Specimen:  Blood Updated:  01/10/19 0708     Glucose 208 mg/dL      BUN 27 mg/dL      Creatinine 0.81 mg/dL      Sodium 129 mmol/L      Potassium 3.7 mmol/L      Chloride 101 mmol/L      CO2 23.0 mmol/L      Calcium 7.9 mg/dL      Total Protein 5.5 g/dL      Albumin 2.71 g/dL      ALT (SGPT) 45 U/L      AST (SGOT) 58 U/L      Alkaline Phosphatase 223 U/L      Total Bilirubin 0.8 mg/dL      eGFR Non African Amer 94 mL/min/1.73      Globulin 2.8 gm/dL      A/G Ratio 1.0 g/dL      BUN/Creatinine Ratio 33.3     Anion Gap 5.0 mmol/L     Narrative:       National Kid[employer] Guidelines    Stage     Description        GFR  1         Normal or High     90+  2         Mild decrease      60-89  3         Moderate decrease  30-59  4         Severe decrease    15-29  5         Kidney failure     <15    The MDRD GFR formula is only valid for adults with stable renal function between ages 18 and 70.    Phosphorus [[id number]]  (Normal) Collected:  01/10/19 0521    Specimen:  Blood Updated:  01/10/19 0708     Phosphorus 3.6 mg/dL     Magnesium [[id number]]  (Normal) Collected:  01/10/19 0521    Specimen:  Blood Updated:  01/10/19 0708     Magnesium 2.1 mg/dL     CBC (No Diff) [[id number]]  (Abnormal) Collected:  01/10/19 0521    Specimen:  Blood Updated:  01/10/19 0632     WBC 16.22 10*3/mm3      RBC 2.94 10*6/mm3      Hemoglobin 8.6 g/dL      Hematocrit 26.4 %       MCV 89.8 fL      MCH 29.3 pg      MCHC 32.6 g/dL      RDW 16.8 %      RDW-SD 54.7 fl      MPV 10.4 fL      Platelets 311 10*3/mm3     POC Glucose Once [[id number]]  (Abnormal) Collected:  01/10/19 0617    Specimen:  Blood Updated:  01/10/19 0620     Glucose 198 mg/dL     POC Glucose Once [[id number]]  (Abnormal) Collected:  01/09/19 2357    Specimen:  Blood Updated:  01/09/19 2358     Glucose 216 mg/dL             Assessment:  Postoperative delirium and failure to thrive in a patient with multiple comorbidities and Whipple's resection complicated by adult necrotizing enterocolitis now resolved.  Repeat CT scan does suggest a right upper quadrant abscess.    Plan:   CT-guided drainage of abscess tomorrow.  I'm hopeful that this area of potential  infection being treated may improve his functional status and mental status.  I did discuss with the wife at length the appropriateness of needing placement after discharge from this facility to allow more time for recovery.  I think an option closer at home is appropriate.  This can be considered in more detail neck suite  Miquel Brody MD - 1/10/2019, 7:38 PM

## 2019-01-11 NOTE — PLAN OF CARE
Problem: Patient Care Overview  Goal: Plan of Care Review  Outcome: Ongoing (interventions implemented as appropriate)   01/10/19 1859   OTHER   Outcome Summary Patient remains drowsy and lethargic throughout shift. FC at times. Tachypneic with movement. VSS. Possible rhythm change noted with 1800 assessment. EKG obtained. NSR. See nursing note. Abdomen remains distended, taut, firm, and tender to the touch. PRN Dilaudid administered once at 1600 after returning from CT of abdomen and pelvis. Constrast dye given through Gtube and IV. Verified with Dr. Brody first due to CKD. Waiting for results. Will continue to monitor.    Coping/Psychosocial   Plan of Care Reviewed With patient;spouse   Plan of Care Review   Progress no change       Problem: Fall Risk (Adult)  Goal: Absence of Fall  Outcome: Ongoing (interventions implemented as appropriate)   01/10/19 1859   Fall Risk (Adult)   Absence of Fall making progress toward outcome       Problem: Skin Injury Risk (Adult)  Goal: Skin Health and Integrity  Outcome: Ongoing (interventions implemented as appropriate)   01/10/19 1859   Skin Injury Risk (Adult)   Skin Health and Integrity making progress toward outcome       Problem: Pain, Acute (Adult)  Goal: Acceptable Pain Control/Comfort Level  Outcome: Ongoing (interventions implemented as appropriate)   01/10/19 1859   Pain, Acute (Adult)   Acceptable Pain Control/Comfort Level making progress toward outcome

## 2019-01-11 NOTE — NURSING NOTE
Patient arrived with ICU staff, on cardiac monitors, vitals stable. Initial images taken and reviewed by Dr. Guzmán. A 12 F drain placed in the right lateral abdomen with 1800 ml purulent drainage. Samples sent to lab. Patient tolerated well. Patient taken back to unit by ICU staff.

## 2019-01-11 NOTE — PROGRESS NOTES
Pulmonary/Critical Care Follow-up     LOS: 11 days   Patient Care Team:  Adiel Denney MD as PCP - General  Loida Campbell APRN as Nurse Practitioner (Nurse Practitioner)    Chief Complaint / reason : Pancreatic cancer / medical management of post-operative conditions including diabetes, atrial fibrillation.        Subjective    68-year-old male with a history of biliary tract obstruction who underwent cholecystectomy last August with subsequent ERCP for biliary stricture with stent placement.  He subsequently had an FNA on September 28 which was suspicious for adenocarcinoma in the head of the pancreas.  He had a repeat ERCP on 10/1/2018 and a new stent was placed.  He has been treated multiple times for recurrent bacteremias associated with his biliary obstruction.  He was subsequently treated with neoadjuvant chemotherapy.    The patient underwent a Whipple procedure with wedge biopsy of liver tumor and portal vein resection/lateral repair with gastrojejunostomy tube placement by Dr. Brody on December 31, 2018.  He is transferred to the intensive care unit on January 5 with increasing confusion and tachypnea and a CT scan showing portal venous gas, pneumatosis/ileus and intra-abdominal fluid.    Interval History:   Patient remains drowsy today.  Ongoing low-grade fevers.  Occasional coughing.  Ongoing tremor.  Seems more confused today.  Family at bedside.  Plan for CT abscess drain today.    History taken from: Patient, staff.    PMH/FH/Social History were reviewed and updated appropriately in the electronic medical record.     Review of Systems:    Review of 14 systems was completed with positives and pertinent negatives noted in the subjective section.  All other systems reviewed and are negative.         Objective     Vital Signs  Temp:  [99.4 °F (37.4 °C)-100.9 °F (38.3 °C)] 99.7 °F (37.6 °C)  Heart Rate:  [] 89  Resp:  [18-26] 20  BP: (125-161)/() 141/84  01/10 0701 - 01/11  0700  In: 3681.5 [P.O.:120; I.V.:26.6]  Out: 1475 [Urine:1455]  Body mass index is 30.44 kg/m².     Physical Exam:     Constitutional:   Drowsy, cooperative, in no acute distress   Head:   Normocephalic, without obvious abnormality, atraumatic   Eyes:           Lids and lashes normal, conjunctivae and sclerae normal.  No icterus, no pallor, corneas clear, PER   ENMT:  Ears appear intact with no abnormalities noted     No oral lesions, no thrush, oral mucosa moist   Neck:  No adenopathy, supple, trachea midline, no thyromegaly, no JVD   Lungs/Resp:    Normal effort, symmetric chest rise, no crepitus, clear to      auscultation bilaterally, no chest wall tenderness                Heart/CV:   Regular rhythm and normal rate, normal S1 and S2, no            murmur   Abdomen/GI:    Positive bowel sounds, midline wound with staples intact, no masses, no organomegaly, soft, mildly tender, right drain site with minimal serous drainage, non-distended   :    Deferred   Extremities/MSK:  No clubbing or cyanosis.  No edema.  Normal tone.    No deformities.    Pulses:  Pulses palpable and equal bilaterally   Skin:  No bleeding, bruising or rash   Heme/Lymph:  No cervical or supraclavicular adenopathy.   Neurologic:      Psychiatric:    Moves all extremities with no obvious focal motor deficit.  Cranial nerves 2 - 12 grossly intact, positive asterixis.  Concentration poor.  Drowsy, normal affect.      Results Review:     I reviewed the patient's new clinical results.   Results from last 7 days   Lab Units  01/11/19   0416  01/10/19   0521  01/09/19   0417   01/08/19   0311   SODIUM mmol/L  132  129*  131*   --   131*   POTASSIUM mmol/L  4.2  3.7  3.7   < >  3.3*   CHLORIDE mmol/L  101  101  102   --   101   CO2 mmol/L  24.0  23.0  22.0   --   23.0   BUN mg/dL  28*  27*  25*   --   28*   CREATININE mg/dL  0.80  0.81  0.92   --   0.88   CALCIUM mg/dL  7.5*  7.9*  8.0*   --   8.1*   BILIRUBIN mg/dL   --   0.8  0.8   --   0.8   ALK  PHOS U/L   --   223*  188*   --   181*   ALT (SGPT) U/L   --   45*  35   --   28   AST (SGOT) U/L   --   58*  46*   --   43*   GLUCOSE mg/dL  130*  208*  246*   --   230*    < > = values in this interval not displayed.     Results from last 7 days   Lab Units  01/11/19   0416  01/10/19   0521  01/08/19   0311   01/06/19   0506   WBC 10*3/mm3  13.86*  16.22*  20.45*   < >  18.36*   HEMOGLOBIN g/dL  7.8*  8.6*  7.9*   < >  9.0*   HEMATOCRIT %  24.0*  26.4*  23.4*   < >  27.1*   PLATELETS 10*3/mm3  342  311  297   < >  279   MONOCYTES % %   --    --    --    --   5.0    < > = values in this interval not displayed.     Results from last 7 days   Lab Units  01/10/19   1634   PH, ARTERIAL pH units  7.519*   PO2 ART mm Hg  76.8*   PCO2, ARTERIAL mm Hg  28.5   HCO3 ART mmol/L  23.2     Results from last 7 days   Lab Units  01/11/19   0416  01/10/19   0521  01/09/19   0417   MAGNESIUM mg/dL  1.8  2.1  1.9   PHOSPHORUS mg/dL  3.6  3.6  3.4   procalcitonin 1/6: 6.71 -> 1/7: 3.52.         I reviewed the patient's new imaging including images and reports.  CXR 1/6/18:  IMPRESSION:  Low lung volumes with increased markings at the lung bases  Bilaterally.    CT Abd/pelvis 1/10/19:  IMPRESSION:  1. Very large, increasing subcapsular collection of the liver,  presumably enlarging biloma. Infected biloma cannot be excluded. Mildly  increased ascites compared to 1/5/2019 exam.   2. Previously noted small bowel pneumatosis has resolved. Small bowel  now appears practically normal.  3. Mild to moderate bilateral lower lobe atelectasis and very small  effusions.    Medication Review:     budesonide 0.5 mg Nebulization BID - RT   enoxaparin 1 mg/kg Subcutaneous Q12H   Fat Emulsion Fish Oil Based 200 mL Intravenous Q24H   flecainide 50 mg Oral Q12H   insulin regular 0-9 Units Subcutaneous Q6H   insulin regular 10 Units Subcutaneous Q6H   ipratropium-albuterol 3 mL Nebulization 4x Daily - RT   Linezolid 600 mg Intravenous Q12H   meropenem 1  g Intravenous Q8H   metoprolol tartrate 25 mg Oral Q12H   micafungin (MYCAMINE)  mg Intravenous Q24H   pantoprazole 40 mg Intravenous Q AM   Scopolamine 1 patch Transdermal Q72H   sodium chloride 10 mL Intravenous Q12H       Adult Central 2-in-1 TPN  Last Rate: 75 mL/hr at 01/10/19 1811   Pharmacy to Dose TPN         Assessment/Plan       Malignant neoplasm of head of pancreas (CMS/HCC)    Hyperlipidemia    COPD (chronic obstructive pulmonary disease) (CMS/HCC)    Essential hypertension    Gastroesophageal reflux disease without esophagitis    DM2 (diabetes mellitus, type 2) (CMS/HCC)    Paroxysmal atrial fibrillation    Chronic anticoagulation, Eliquis    Post-operative confusion and somnolence, likely due to oversedation    Atrial fibrillation with RVR (CMS/HCC)    Adult necrotizing enterocolitis (CMS/HCC)    Encephalopathy    69 YO with h/o DMII, CKD3, GERD, PAF on Eliquis, locally advanced pancreatic cancer with zulay-adjuvant chemotherapy admitted post Pancreaticoduodenectomy, wedge biopsy of liver, portal vein resection and lateral repair and GJ tube by Dr. Brody on 12/31/18.      Moved to ICU on 1/5/18 due to concerns of breathing difficulty, encephalopathy and findings of pneumatosis, portal venous gas and ileus on CT abdomen/pelvis and concern for sepsis.  Portal venous gas resolved on subsequent imaging.    Patient's condition had been improving however over the last few days has become more somnolent and confused.    Findings of possible intra-abdominal fluid on follow up CT abd/pelvis.      Plan:  1. Lovenox for anticoagulation for atrial fibrilation.  Eventually transition back to Eliquis when okay with surgery.   2. Antibiotics per ID.   3. Continue scheduled regular insulin.  Continue correction insulin.   4. Continue TPN.   5. Continue flecainide.   6.  Continue scheduled metoprolol.   7. Judicious use of narcotics for pain management.    8. PT/OT.  9. Monitor and replace electrolytes.   10.   Plan for CT guided abscess drain today.       Gerardo Messina MD  01/11/19  8:10 AM    *. Please note that portions of this note were completed with a voice recognition program. Efforts were made to edit the dictations, but occasionally words are mistranscribed.

## 2019-01-11 NOTE — PROGRESS NOTES
Adult Nutrition  Assessment/PES    Patient Name:  Todd Phillips  YOB: 1950  MRN: [id number]  Admit Date:  12/31/2018    Assessment Date:  1/11/2019    Comments:  PN delivery over past 24 hrs at 95% of rx'd volume PN- 7.0 %AA, D20W w/ 200 ml SMOF IVLE daily ; plan for po clear liquids and NO jejunostomy feedings. Pt currently no taking po diet r/t lethargy. RD following will adjust PN  formulation once po intake established.    Reason for Assessment     Row Name 01/11/19 1549          Reason for Assessment    Reason For Assessment  TF/PN;follow-up protocol;per organizational policy MDR; PN, po  f/u  45 mins     Diagnosis  cancer diagnosis/related complications;gastrointestinal disease pancreatic cancer, s/p Whipple procedure 12/31, wedge bx of liver tumor, portal vein resection/repair and G/J tube placed. ; SOA, ileus, pneumatosis, portal vein gas     Identified At Risk by Screening Criteria  tube feeding or parenteral nutrition         Nutrition/Diet History     Row Name 01/11/19 1546          Nutrition/Diet History    Typical Food/Fluid Intake  RN reports pt too drowsy too eat or drink this am; returned from CT, RN reports 1.8L of light tan opaque fluid removed. Okay for pt to take  po clear liquids w/ NG clamped.     Factors Affecting Nutritional Intake  altered gastrointestinal function           Labs/Tests/Procedures/Meds     Row Name 01/11/19 1545          Labs/Procedures/Meds    Lab Results Reviewed  reviewed, pertinent     Lab Results Comments  elev glucose        Diagnostic Tests/Procedures    Diagnostic Test/Procedure Reviewed  reviewed, pertinent     Diagnostic Test/Procedures Comments  abscess drainage; infected ascites noted        Medications    Pertinent Medications Reviewed  reviewed, pertinent     Pertinent Medications Comments  insulin adjusted             Nutrition Prescription Ordered     Row Name 01/11/19 4132          Nutrition Prescription PO    Current PO Diet  Clear Liquid         Nutrition Prescription PN    PN Route  PICC     PN Goal Rate (mL/hr)  75 mL/hr     PN Goal Volume (mL)  1800 mL     Dextrose Concentration (%)  20 %     Amino Acid Concentration (%)  7 %     Lipid Concentration (%)  20%     Lipid Volume (mL)  200 mL     Lipid Frequency  Daily         Evaluation of Received Nutrient/Fluid Intake     Row Name 01/11/19 1551          Nutrient/Fluid Evaluation    Number of Days Evaluated  1 day        Calories Evaluation    Parenteral Calories (kcal)  2032     Total Calories (kcal)  2032     % of Kcal Needs  92        Protein Evaluation    Parenteral Protein (gm)  119     Total Protein (gm)  119     % of Protein Needs  88        Intake Assessment    Energy/Calorie Requirement Assessment  not meeting needs     Protein Requirement Assessment  not meeting needs     Tolerance  tolerating        Recommended Daily Intake Evaluation    RDI  Met        PN Evaluation    Number of Days PN Evaluated  1 day     PN Average delivery (mL/day)   1701 mL/day 95% of rx'd volume     PN Average lipid delivery (mL/day)   200 mL/day 359 ml documeted; pharmacist reports only 200 ml provided of SMOF lipid ; considered possible chart /pump clearance documentation issue               Problem/Interventions:  Problem 1     Row Name 01/11/19 1555          Nutrition Diagnoses Problem 1    Problem 1  Altered GI Function     Etiology (related to)  Medical Diagnosis     Gastrointestinal  Other (comment);Ileus s/p whipple, liver biopsy and portal vein resection/repair; PEG/J placement (12/31); infected ascites (1/11)     Signs/Symptoms (evidenced by)  Report of Mnimal PO Intake         Problem 2     Row Name 01/11/19 1556          Nutrition Diagnoses Problem 2    Problem 2  Inadequate Intake/Infusion     Inadequate Intake Type  Parenteral     Macronutrient  Kcal;Protein     Etiology (related to)  MNT for Treatment/Condition     Signs/Symptoms (evidenced by)  PN Intake Delivery     Percent (%) of  PN goal  95 %                Intervention Goal     Row Name 01/11/19 1556          Intervention Goal    General  Meet nutritional needs for age/condition;Nutrition support treatment     TF/PN  Deliver (%) goal 1800 ml of  rx'd volume         Nutrition Intervention     Row Name 01/11/19 1556          Nutrition Intervention    RD/Tech Action  Follow Tx progress;Care plan reviewd;Await begin PO           Education/Evaluation     Row Name 01/11/19 1602          Monitor/Evaluation    Monitor  Per protocol;I&O;Pertinent labs;PN delivery/tolerance;PO intake;Symptoms           Electronically signed by:  Aster Coleman MS,RD,LD  01/11/19 4:04 PM

## 2019-01-11 NOTE — PROGRESS NOTES
Pharmacy Parenteral Nutrition Evaluation  Todd Phillips is a  68 y.o. male receiving TPN.     Indication: Necrotizing enterocolitis  Consulting Physician:  Dr Brody    Labs  Results from last 7 days   Lab Units  01/11/19   0416  01/10/19   0521  01/09/19   0417   01/08/19   0311   SODIUM mmol/L  132  129*  131*   --   131*   POTASSIUM mmol/L  4.2  3.7  3.7   < >  3.3*   CHLORIDE mmol/L  101  101  102   --   101   CO2 mmol/L  24.0  23.0  22.0   --   23.0   BUN mg/dL  28*  27*  25*   --   28*   CREATININE mg/dL  0.80  0.81  0.92   --   0.88   CALCIUM mg/dL  7.5*  7.9*  8.0*   --   8.1*   BILIRUBIN mg/dL   --   0.8  0.8   --   0.8   ALK PHOS U/L   --   223*  188*   --   181*   ALT (SGPT) U/L   --   45*  35   --   28   AST (SGOT) U/L   --   58*  46*   --   43*   GLUCOSE mg/dL  130*  208*  246*   --   230*    < > = values in this interval not displayed.     Results from last 7 days   Lab Units  01/11/19   0416  01/10/19   0521  01/09/19   0417   01/06/19   1406   MAGNESIUM mg/dL  1.8  2.1  1.9   < >   --    PHOSPHORUS mg/dL  3.6  3.6  3.4   < >   --    PREALBUMIN mg/dL   --    --    --    --   <5.0*    < > = values in this interval not displayed.     Results from last 7 days   Lab Units  01/11/19   0416  01/10/19   0521  01/08/19   0311   WBC 10*3/mm3  13.86*  16.22*  20.45*   HEMOGLOBIN g/dL  7.8*  8.6*  7.9*   HEMATOCRIT %  24.0*  26.4*  23.4*   PLATELETS 10*3/mm3  342  311  297     Triglycerides   Date Value Ref Range Status   01/07/2019 133 0 - 150 mg/dL Final     estimated creatinine clearance is 107.2 mL/min (by C-G formula based on SCr of 0.8 mg/dL).    Intake & Output (last 3 days)       01/08 0701 - 01/09 0700 01/09 0701 - 01/10 0700 01/10 0701 - 01/11 0700 01/11 0701 - 01/12 0700    P.O.   120     I.V. (mL/kg) 627 (6.9) 733.8 (8) 26.6 (0.3) 106 (1)    Other 255 300 440 30    NG/GT   60     IV Piggyback 900 469.2 974.7 345    TPN 2013 2006.4 2060.2 391    Total Intake(mL/kg) 3795 (41.5) 3509.4 (38.4) 3681.5  (36.1) 872 (8.4)    Urine (mL/kg/hr) 1525 (0.7) 1450 (0.7) 1455 (0.6) 525 (0.8)    Emesis/NG output 275  20     Stool 0 0 0     Wound        Total Output 1800 1450 1475 525    Net +1995 +2059.4 +2206.5 +347            Stool Unmeasured Occurrence 4 x 2 x 2 x           Dietitian Recommendations     Current TPN Regimen Recommendation:  Dextrose 20% / Amino Acid 7% at goal rate of 75mL/hr.  20% SMOF Lipid Emulsion 200mL every 24 hours.    Assessment/Plan:  1. Continuing TPN for necrotizing enterocolitis. Macronutrients per dietary recommendation as listed above. Goal rate of 75mL/hr.  2. Will continue with standard electrolytes and 1:1 acid base. Electrolyte replacements and sliding scale insulin are available. Mag replaced this AM.  3. Pharmacy will continue to follow and adjust as indicated.     Thanks,   Dhara Brooks, PharmD  Pharmacy Resident  1/11/2019  1:22 PM

## 2019-01-11 NOTE — PROGRESS NOTES
"General Surgery Daily Progress Note    01/11/19    Todd Phillips  [id number]  1950    11 Days Post-Op    Reason for Evaluation: Locally advanced pancreatic cancer  Subjective:  Patient just returned from CT scan    Objective:  /73   Pulse 81   Temp 100 °F (37.8 °C) (Core)   Resp 20   Ht 182.9 cm (72\")   Wt 104 kg (228 lb 13.4 oz)   SpO2 100%   BMI 31.04 kg/m²       INTAKE/OUTPUT      Intake/Output Summary (Last 24 hours) at 1/11/2019 1448  Last data filed at 1/11/2019 1348  Gross per 24 hour   Intake 3408.75 ml   Output 3400 ml   Net 8.75 ml       General Appearance: Somnolent but no distress  Eyes: Anicteric  Neck: Trachea midline   Cardiovascular:  RRR  Lungs:  Bilateral respirations unlabored   Abdomen:  Softer abdomen drain character is consistent with infected ascites  Extremities:  No cyanosis or edema   Skin:  No jaundice  Neurologic: awake and conversant   Surgical Site: Tubes and incision intact      Imaging Results (last 24 hours)     Procedure Component Value Units Date/Time    CT Guided Abscess Drain Peritoneal [[id number]] Updated:  01/11/19 1404    CT Abdomen Pelvis With & Without Contrast [[id number]] Collected:  01/10/19 1705     Updated:  01/10/19 2301    Narrative:       EXAMINATION: CT ABDOMEN/PELVIS WWO CONTRAST - 1/10/2019     INDICATION: Z01.89-Encounter for other specified special examinations;  C25.9-Malignant neoplasm of pancreas, unspecified; Z74.09-Other reduced  mobility.     TECHNIQUE: 5 mm post oral contrast images through the abdomen and pelvis  with subsequent post-IV contrast portal venous phase and delayed venous  phase images.     The radiation dose reduction device was turned on for each scan per the  ALARA (As Low as Reasonably Achievable) protocol.     COMPARISON: 1/5/2019 abdomen and pelvis CT scan.     FINDINGS: Since the previous exam, the patient's subcapsular fluid  collection has significantly further enlarged, approximately 21 x 10 cm  near the dome of the " liver and 24 x 5.7 cm more inferiorly. Fluid  collection extends around the medial margin of the left lobe as well.  There are multiple air bubbles along the margins of the collection and  internal to it indicating possible multilocular or thick/viscous  collection. There is considerable mass effect on the liver. No intrinsic  liver parenchymal lesion is seen. There is a small amount of residual  free air expected for recent surgery. Jejunostomy tube is again noted,  in expected intraluminal position. Small bowel loops appear grossly  normal. There is only a small amount of free ascites. No gross  abnormalities are noted of the spleen, adrenal glands, or kidneys.  Gallbladder is resected. There is no evidence of bowel obstruction.     Regarding the lower abdomen and pelvis, mild ascites is seen along the  paracolic gutters and in the pelvis. The bladder is decompressed by a  Hassan balloon catheter. Colon is nondistended.     Included images of the lung bases show small effusions and mild  bibasilar atelectasis, a little increased from the prior study.       Impression:       1. Very large, increasing subcapsular collection of the liver,  presumably enlarging biloma. Infected biloma cannot be excluded. Mildly  increased ascites compared to 1/5/2019 exam.   2. Previously noted small bowel pneumatosis has resolved. Small bowel  now appears practically normal.  3. Mild to moderate bilateral lower lobe atelectasis and very small  effusions.     DICTATED:   1/10/2019  EDITED/ls :   1/10/2019      This report was finalized on 1/10/2019 10:59 PM by DR. Darrell Remy MD.       XR Chest 1 View [[id number]] Collected:  01/09/19 1955     Updated:  01/10/19 2243    Narrative:       EXAMINATION: XR CHEST 1 VW-      INDICATION: Cough; Z74.09-Other reduced mobility; Z01.89-Encounter for  other specified special examinations; C25.9-Malignant neoplasm of  pancreas, unspecified.     COMPARISON: 01/06/2019.     FINDINGS: Right-sided  Port-A-Cath is again noted. Left upper extremity  PICC line is now seen with its tip in the mid SVC. The heart is  borderline enlarged. The vasculature appears normal. Lung volumes are  relatively low. There is minimal bibasilar discoid atelectasis. Lungs  otherwise appear clear.           Impression:       Low lung volumes and minimal bibasilar discoid atelectasis.  PICC line tip in the mid SVC.     D:  01/09/2019  E:  01/10/2019     This report was finalized on 1/10/2019 10:40 PM by DR. Darrell Remy MD.             Labs:  Lab Results (last 24 hours)     Procedure Component Value Units Date/Time    Bilirubin, Body Fluid - Body Fluid, Peritoneum [[id number]] Collected:  01/11/19 1403    Specimen:  Body Fluid from Peritoneum Updated:  01/11/19 1425    Fungus Culture - Body Fluid, Peritoneum [[id number]] Collected:  01/11/19 1403    Specimen:  Body Fluid from Peritoneum Updated:  01/11/19 1425    Body Fluid Culture - Body Fluid, Peritoneum [[id number]] Collected:  01/11/19 1403    Specimen:  Body Fluid from Peritoneum Updated:  01/11/19 1425    Anaerobic Culture - Body Fluid, Peritoneum [[id number]] Collected:  01/11/19 1403    Specimen:  Body Fluid from Peritoneum Updated:  01/11/19 1424    Amylase, Body Fluid - Body Fluid, Peritoneum [[id number]] Collected:  01/11/19 1403    Specimen:  Body Fluid from Peritoneum Updated:  01/11/19 1424    Non-gynecologic Cytology [[id number]] Collected:  01/11/19 1403    Specimen:  Body Fluid from Abdominal Cavity Updated:  01/11/19 1412    aPTT [[id number]]  (Normal) Collected:  01/11/19 1105    Specimen:  Blood Updated:  01/11/19 1142     PTT 28.2 seconds     Narrative:       PTT = The equivalent PTT values for the therapeutic range of heparin levels at 0.3 to 0.5 U/ml are 55 to 70 seconds.    Protime-INR [[id number]]  (Normal) Collected:  01/11/19 1105    Specimen:  Blood Updated:  01/11/19 1142     Protime 11.0 Seconds      INR 1.05    POC Glucose Once [[id number]]  (Abnormal) Collected:   01/11/19 1120    Specimen:  Blood Updated:  01/11/19 1124     Glucose 204 mg/dL     Basic Metabolic Panel [[id number]]  (Abnormal) Collected:  01/11/19 0416    Specimen:  Blood Updated:  01/11/19 0543     Glucose 130 mg/dL      BUN 28 mg/dL      Creatinine 0.80 mg/dL      Sodium 132 mmol/L      Potassium 4.2 mmol/L      Chloride 101 mmol/L      CO2 24.0 mmol/L      Calcium 7.5 mg/dL      eGFR Non African Amer 96 mL/min/1.73      BUN/Creatinine Ratio 35.0     Anion Gap 7.0 mmol/L     Narrative:       [employer] Guidelines    Stage     Description        GFR  1         Normal or High     90+  2         Mild decrease      60-89  3         Moderate decrease  30-59  4         Severe decrease    15-29  5         Kidney failure     <15    The MDRD GFR formula is only valid for adults with stable renal function between ages 18 and 70.    Phosphorus [[id number]]  (Normal) Collected:  01/11/19 0416    Specimen:  Blood Updated:  01/11/19 0543     Phosphorus 3.6 mg/dL     Magnesium [[id number]]  (Normal) Collected:  01/11/19 0416    Specimen:  Blood Updated:  01/11/19 0543     Magnesium 1.8 mg/dL     POC Glucose Once [[id number]]  (Abnormal) Collected:  01/11/19 0522    Specimen:  Blood Updated:  01/11/19 0523     Glucose 146 mg/dL     CBC (No Diff) [[id number]]  (Abnormal) Collected:  01/11/19 0416    Specimen:  Blood Updated:  01/11/19 0521     WBC 13.86 10*3/mm3      RBC 2.65 10*6/mm3      Hemoglobin 7.8 g/dL      Hematocrit 24.0 %      MCV 90.6 fL      MCH 29.4 pg      MCHC 32.5 g/dL      RDW 17.1 %      RDW-SD 55.5 fl      MPV 10.5 fL      Platelets 342 10*3/mm3     POC Glucose Once [[id number]]  (Abnormal) Collected:  01/10/19 2322    Specimen:  Blood Updated:  01/10/19 2323     Glucose 225 mg/dL     Blood Culture - Blood, Arm, Left [[id number]] Collected:  01/05/19 1759    Specimen:  Blood from Arm, Left Updated:  01/10/19 1900     Blood Culture No growth at 5 days    Narrative:       Aerobic bottle only     Blood Culture - Blood, Arm, Left [[id number]] Collected:  01/05/19 1830    Specimen:  Blood from Arm, Left Updated:  01/10/19 1900     Blood Culture No growth at 5 days    POC Glucose Once [[id number]]  (Abnormal) Collected:  01/10/19 1742    Specimen:  Blood Updated:  01/10/19 1744     Glucose 151 mg/dL     Blood Gas, Arterial With Co-Ox [[id number]]  (Abnormal) Collected:  01/10/19 1634    Specimen:  Arterial Blood Updated:  01/10/19 1648     Site Right Radial     Dameon's Test Positive     pH, Arterial 7.519 pH units      Comment: 83 Value above reference range        pCO2, Arterial 28.5 mm Hg      Comment: 84 Value below reference range        pO2, Arterial 76.8 mm Hg      Comment: 84 Value below reference range        HCO3, Arterial 23.2 mmol/L      Base Excess, Arterial 0.6 mmol/L      Hemoglobin, Blood Gas 8.0 g/dL      Comment: 84 Value below reference range        Hematocrit, Blood Gas 24.5 %      Oxyhemoglobin 95.3 %      Methemoglobin 0.40 %      Carboxyhemoglobin 1.8 %      CO2 Content 24.1 mmol/L      Temperature 37.0 C      Barometric Pressure for Blood Gas -- mmHg      Comment: N/A        Modality Nasal Cannula     FIO2 31 %      Ventilator Mode       Comment: Meter: [id number]     :  [id number]        Note --     pH, Temp Corrected 7.519 pH Units      pCO2, Temperature Corrected 28.5 mm Hg      pO2, Temperature Corrected 76.8 mm Hg             Assessment:  Abdominal sepsis secondary to infected ascites clinically.  This may account for some of the patient's failure to thrive and mental status issues.    Plan:   Continued drainage.  I have no intention of restarting tube feeds given patient's previous enterocolitis.  Can resume clears and therapeutic Lovenox.  Clamp G-tube unless patient complains of nausea.    Miquel Brody MD - 1/11/2019, 2:48 PM

## 2019-01-11 NOTE — PROGRESS NOTES
I went to see patient in ICU. Patient was sleeping sitting up in a chair. I did talk with a man in the room and asked him to let spouse know that I stopped by. I will continue to navigate as needed. AG

## 2019-01-11 NOTE — PLAN OF CARE
Problem: Patient Care Overview  Goal: Plan of Care Review  Outcome: Ongoing (interventions implemented as appropriate)   01/11/19 5014   OTHER   Outcome Summary Pt's active participation limited by lethargy; constant vc's for keeping eyes open and attending to task. Able to perform pivot t/f to recliner with max x2 A; noted post lean with sitting/standing balance today. Edu son re: LE HEP. Will cont PT POC.    Coping/Psychosocial   Plan of Care Reviewed With patient;family   Plan of Care Review   Progress no change

## 2019-01-11 NOTE — PROGRESS NOTES
[facility] Progress Note        Antibiotics:  Anti-Infectives (From admission, onward)    Ordered     Dose/Rate Route Frequency Start Stop    01/07/19 0942  Linezolid (ZYVOX) 600 mg 300 mL     Ordering Provider:  Scot Higgins MD    600 mg  300 mL/hr over 60 Minutes Intravenous Every 12 Hours Scheduled 01/07/19 1100 01/17/19 0859    01/06/19 1306  meropenem (MERREM) 1 g/100 mL 0.9% NS VTB (mbp)     Scot Higgins MD reviewed the order on 01/11/19 0842.   Ordering Provider:  Scot Higgins MD    1 g  over 3 Hours Intravenous Every 8 Hours 01/06/19 1600 01/17/19 2359    01/05/19 1820  micafungin 100 mg/100 mL 0.9% NS IVPB (mbp)     Scot Higgins MD reviewed the order on 01/10/19 0719.   Ordering Provider:  Scot Higgins MD    100 mg  over 60 Minutes Intravenous Every 24 Hours 01/05/19 2000 01/17/19 0718    01/05/19 1859  meropenem (MERREM) 1 g/100 mL 0.9% NS VTB (mbp)     Ordering Provider:  Levi Bustos, RPH    1 g  over 30 Minutes Intravenous Once 01/05/19 1945 01/05/19 2039 01/05/19 1859  vancomycin 2250 mg/500 mL 0.9% NS IVPB ([facility])     Ordering Provider:  Levi Bustos RPH    2,250 mg Intravenous Once 01/05/19 1945 01/05/19 2009 01/01/19 0936  piperacillin-tazobactam (ZOSYN) 3.375 g in iso-osmotic dextrose 50 ml (premix)     Ordering Provider:  Miquel Brody MD    3.375 g  over 30 Minutes Intravenous Once 01/01/19 1030 01/01/19 1209    12/31/18 0632  ceFAZolin in dextrose (ANCEF) IVPB solution 2 g     Ordering Provider:  Miquel Brody MD    2 g  over 30 Minutes Intravenous Once 12/31/18 0634 12/31/18 0800    12/31/18 0632  metroNIDAZOLE (FLAGYL) IVPB 500 mg     Ordering Provider:  Miquel Brody MD    500 mg  over 60 Minutes Intravenous Once 12/31/18 0634 12/31/18 0830          CC: fatigue, abd pain    HPI:    Patient is a 68 y.o.  Yr old male with history of biliary tract obstruction, admitted to [facility] multiple times in the last  2 months including August 10 until August 16, underwent cholecystectomy with pathology suggesting chronic cholecystitis, had ERCP on August 14 with biliary stricture/proximal duct dilation and had stent placement.  He was readmitted August 21, 2018 until August 25, 2018 with acute sepsis, Klebsiella bacteremia, discharged with oral antibiotics.  Readmitted September 12, 2018 until September 17, 2018 with ESBL Escherichia coli bacteremia seen by infectious disease in [place] and treated with Invanz, duration of therapy unclear but approximately until September 24.  During that period of time, concern for malignancy mentioned in notes and referred to [facility] for further biopsy, done September 28.  Presented once again to [facility] emergency room September 30, 2018 with acute onset abdominal pain/nausea/vomiting.  Blood cultures positive again with  Klebsiella species and  Transferred to [facility]. 10/1/18 ERCP with evidence for obstruction/purulence and new stent placed; He was treated with rocephin/flagyl and last seen by us early October; after that, he had more definitive diagnosis of pancreatic cancer and treated with neoadjuvant chemotherapy and right chest port placement.  In addition he required TPN.  Family not aware of any other specific microbiologic diagnosis since October; he was admitted December 31 and underwent pancreaticoduodenectomy, wedge biopsy of liver tumor and portal vein resection/lateral repair with gastrojejunostomy tube placement.  Moved to ICU on January 5 with concern regarding increased confusion/Tachypnea, abnormal CT scan with pneumatosis/ileus and portal venous gas in addition to intra-abdominal fluid per description of radiology.  Chest x-ray January 6 with low lung volumes and increased markings at lung bases bilaterally but no focal parenchymal opacification per radiology.  Head CT no acute intracranial abnormality.     1/11/19 Patient   "sleepy/Confused and does not cooperate with a history or review of systems.  He answers yes/no to some simple questions but unclear reliability.  Nursing reports left arm PICC line placed January 6 and remains stable, chronic right port in the chest with no new redness/swelling or change there.  Indwelling Hassan catheter, G/J-tube, and ostomy bag over prior MADDY drain site; MADDY drain removed January 6.  No stool output since admission per nursing.  No rash.  On nasal cannula oxygen with no productive cough. LG fever at 100.9 last 24 hours     Patient does not cooperate with ROS otherwise      ROS:        1/11/19 as above, patient confused and not cooperative with review of systems          PE:   /84 (BP Location: Right arm, Patient Position: Lying)   Pulse 89   Temp 99.7 °F (37.6 °C) (Core)   Resp 20   Ht 182.9 cm (72\")   Wt 104 kg (228 lb 13.4 oz)   SpO2 99%   BMI 31.04 kg/m²       GENERAL: He opens his eyes, follows simple commands but not oriented.  Nasal cannula oxygen  HEENT: Normocephalic, atraumatic.  PERRL. EOMI. No conjunctival injection. No icterus. Oropharynx clear without evidence of thrush or exudate. No evidence of peridontal disease.    NECK: Supple without nuchal rigidity. No mass.  LYMPH: No cervical, axillary or inguinal lymphadenopathy.  HEART: RRR; No murmur, rubs, gallops.   LUNGS: Diminished at bases bilaterally without wheezing, rales, rhonchi. Normal respiratory effort. Nonlabored. No dullness.  ABDOMEN: Soft, tender and distended. Positive bowel sounds managed. No rebound or guarding. NO mass or HSM.  Ostomy bag noted at right abdomen but no active drainage or fluid in bag.  Surgical site noted with no redness induration or warmth.  No mass bulge or fluctuance.  No crepitus or bulla.  Feeding tube site noted with no redness induration or warmth.  No mass bulge or fluctuance appreciated.  No crepitus or bulla.  No purulence or skin necrosis  EXT:  No cyanosis, clubbing. No " cord.  : Genitalia generally unremarkable.  With Hassan catheter.  MSK: FROM without joint effusions noted arms/legs.    SKIN: Warm and dry without cutaneous eruptions on Inspection/palpation.    NEURO: He moves all 4 extremities spontaneously but he is not cooperate with a detailed motor or sensory exam     Right chest port with no redness induration or warmth.  No crepitus or bulla.  No purulence     Left arm PICC line with no redness or purulence     No peripheral stigmata/phenomena of endocarditis        Laboratory Data    Results from last 7 days   Lab Units  01/11/19   0416  01/10/19   0521  01/08/19   0311   WBC 10*3/mm3  13.86*  16.22*  20.45*   HEMOGLOBIN g/dL  7.8*  8.6*  7.9*   HEMATOCRIT %  24.0*  26.4*  23.4*   PLATELETS 10*3/mm3  342  311  297     Results from last 7 days   Lab Units  01/11/19   0416   SODIUM mmol/L  132   POTASSIUM mmol/L  4.2   CHLORIDE mmol/L  101   CO2 mmol/L  24.0   BUN mg/dL  28*   CREATININE mg/dL  0.80   GLUCOSE mg/dL  130*   CALCIUM mg/dL  7.5*     Results from last 7 days   Lab Units  01/10/19   0521   ALK PHOS U/L  223*   BILIRUBIN mg/dL  0.8   ALT (SGPT) U/L  45*   AST (SGOT) U/L  58*         Results from last 7 days   Lab Units  01/06/19   1406   CRP mg/dL  20.71*       Estimated Creatinine Clearance: 107.2 mL/min (by C-G formula based on SCr of 0.8 mg/dL).      Microbiology:      Radiology:  Imaging Results (last 72 hours)     Procedure Component Value Units Date/Time    XR Chest 1 View [[id number]] Collected:  01/06/19 0915     Updated:  01/07/19 1411    Narrative:          EXAMINATION: XR CHEST 1 VW - 1/6/2019     INDICATION: Z01.89-Encounter for other specified special examinations;  C25.9-Malignant neoplasm of pancreas, unspecified; Z74.09-Other reduced  mobility      COMPARISON: 01/05/2019     FINDINGS: Portable chest reveals low lung volumes. Deep line catheter on  the right with tip in the SVC. Mild increased markings at lung bases  bilaterally with degenerative  changes seen within the spine.  No focal  parenchymal opacification present.       Impression:       Low lung volumes with increased markings at the lung bases  bilaterally.     DICTATED:   1/6/2019  EDITED/ls :   1/6/2019      This report was finalized on 1/7/2019 2:09 PM by Dr. Tasha Guzmán MD.       CT Head Without Contrast [[id number]] Collected:  01/06/19 1803     Updated:  01/07/19 1410    Narrative:       EXAMINATION: CT HEAD WO CONTRAST - 1/6/2019     INDICATION:  Z01.89-Encounter for other specified special examinations;  C25.9-Malignant neoplasm of pancreas, unspecified; Z74.09-Other reduced  mobility. Mental status change.     TECHNIQUE: Multiple axial CT imaging is obtained of the head without the  administration of intravenous contrast.     The radiation dose reduction device was turned on for each scan per the  ALARA (As Low as Reasonably Achievable) protocol.     COMPARISON: 11/17/2018     FINDINGS: Atrophy of the brain with low-density area seen in the  periventricular and subcortical white matter suggesting chronic small  vessel ischemic change. No hemorrhage or hydrocephalus. No mass, mass  effect, or midline shift. No abnormal extra-axial fluid collections  identified. The bony structures reveal no evidence of osseous  abnormality. The visualized paranasal sinuses are clear. The mastoid air  cells are patent.       Impression:       Chronic changes seen within the brain with no acute  intracranial abnormality identified.     DICTATED:   1/6/2019  EDITED/ls :   1/6/2019         This report was finalized on 1/7/2019 2:08 PM by Dr. Tasha Guzmán MD.       XR Chest 1 View [[id number]] Collected:  01/05/19 1928     Updated:  01/06/19 1008    Narrative:          EXAMINATION: XR CHEST 1 VW - 1/5/2019     INDICATION:  Z01.89-Encounter for other specified special examinations;  C25.9-Malignant neoplasm of pancreas, unspecified; Z74.09-Other reduced  mobility      COMPARISON: 01/05/2019      FINDINGS: Portable chest reveals deep line catheter on the right with  tip in the SVC. Mild increased markings at left lung base with small  left pleural effusion. Improvement seen in aeration of the lung bases in  the interval. Degenerative change is seen within the spine. Pulmonary  vascularity is within normal limits.           Impression:       Improvement seen in aeration of the lung bases in the  interval with only minimal right residual markings at the lung bases.     DICTATED:   1/5/2019  EDITED/ls :   1/5/2019      This report was finalized on 1/6/2019 10:06 AM by Dr. Tasha Guzmán MD.       CT Abdomen Pelvis Without Contrast [[id number]] Collected:  01/05/19 1645     Updated:  01/05/19 1655    Narrative:       EXAMINATION: CT ABDOMEN/PELVIS WO CONTRAST - 1/5/2019      INDICATION:  Z01.89-Encounter for other specified special examinations;  C25.9-Malignant neoplasm of pancreas, unspecified; Z74.09-Other reduced  mobility. Abdominal pain.     TECHNIQUE: Multiple axial CT imaging is obtained of the abdomen and  pelvis without the administration of oral or intravenous contrast.     The radiation dose reduction device was turned on for each scan per the  ALARA (As Low as Reasonably Achievable) protocol.     COMPARISON: NONE     FINDINGS: There are small bilateral pleural effusions. Atelectatic  change is seen in the lung bases bilaterally. Extraluminal air is  identified throughout the upper abdomen. There is fluid seen surrounding  the spleen as well as the liver. There is a small to  moderate size  hiatal hernia. There is gastric distention. There are postoperative  changes seen from Whipple procedure. There is a small collection of  fluid identified along the leftward aspect of the liver. There is some  free fluid identified near the sarbjit hepatis. There is a G-tube  identified in satisfactory position. Kidneys and adrenal glands within  normal limits. There is gaseous distention of the small bowel  loops  suggesting a postoperative ileus. There is air identified within the  mesenteric vessels. Findings are concerning for pneumatosis of several  small bowel loops centrally within the abdomen. There is fluid seen  scattered throughout the colon. Mild distention of the colon. There is  some fluid seen within the pelvis.     Pelvis: The pelvic portion of the gastrointestinal tract is within  normal limits. Fluid seen within the colon. The bladder reveals  air-fluid level likely from prior instrumentation. No pelvic adenopathy.  Bony structures reveal no evidence of osseous abnormality.       Impression:       There are small bilateral pleural effusions. There is a  fluid collection seen surrounding the liver and spleen containing air in  the fluid collection around the liver. There is air seen scattered  throughout the abdomen which may be postoperative. There is air  identified within the mesenteric vessels with findings suggesting  pneumatosis throughout scattered small bowel loops within the mid  abdomen. Fluid throughout the colon suggesting clinical presentation of  diarrhea. Gastrostomy tube is in satisfactory position.     DICTATED:   1/5/2019  EDITED/ls :   1/5/2019      This report was finalized on 1/5/2019 4:53 PM by Dr. Tasha Guzmán MD.               Impression:     --Acute postoperative leukocytosis and low-grade fever, approx , at risk for infection multiple sites including abdomen, lung, urine, IV lines, etc.  IV sites look benign at present and Blood cultures negative so far.  Urinalysis with negative leuk esterase/nitrites and UTI generally less likely at present.  Low lung volumes on chest x-ray not unexpected after abdominal surgery, currently no cough and while he is at risk for pneumonia, unable to confirm at present.  Abdomen remains primary concern with enterocolitis, postoperative fluid and on empiric antibiotics with these concerns in mind with Zyvox/Merrem and micafungin.  At  risk for MDR organisms with prior history ESBL and multiple hospitalizations, etc. WBC count trended down some 1/8-1/11. Abd CT 1/10 with fluid collection and plans for aspiration/drain 1/11     --Acute postoperative enterocolitis.  Recent surgery as outlined above in the setting of pancreatic cancer, prior neoadjuvant chemotherapy and tube feeding.  Tube feedings stopped and TPN restarted.  Dr. Brody following closely to determine timing/option/threshold for further surgical intervention or serial imaging/percutaneous drainage etc.  I d/w Dr Brody    --acute postop fluid collection by CT 1/10;  Perc drain/aspirate planned 1/11 per nursing     --Pancreatic cancer with prior neoadjuvant chemotherapy, indwelling chronic port and recent surgery as outlined above.     --COPD     --Diabetes type 2     --Chronic anticoagulation with history A. fib and past antiarrhythmic therapy     --Hx ESBL    PLAN:      --IV Zyvox/Merrem and Mycamine     --Check/review labs cultures and scans     --Discussed with family, history per them     --History per nursing as well     --Discussed with microbiology     --Highly complex set of issues with high risk for further serious morbidity and other serious sequela    --d/w Dr Brody and family     --If not steadily improving, you should give consideration to serial imaging and consideration for further surgical intervention versus interventional radiology options, etc.    --you should send any aspirated fluid for culture           Scot Higgins MD  1/11/2019

## 2019-01-11 NOTE — THERAPY TREATMENT NOTE
Acute Care - Physical Therapy Treatment Note  [facility]     Patient Name: Todd Phillips  : 1950  MRN: [id number]  Today's Date: 2019  Onset of Illness/Injury or Date of Surgery: 18  Date of Referral to PT: 19  Referring Physician: MD Mayuri    Admit Date: 2018    Visit Dx:    ICD-10-CM ICD-9-CM   1. Impaired functional mobility, balance, gait, and endurance Z74.09 V49.89   2. Laboratory test Z01.89 V72.60   3. Pancreatic cancer (CMS/HCC) C25.9 157.9   4. 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)   • DM2 (diabetes mellitus, type 2) (CMS/HCC)   • Paroxysmal atrial fibrillation   • Chronic anticoagulation, Eliquis   • Encounter for monitoring flecainide therapy   • Malignant neoplasm of head of pancreas (CMS/HCC)   • Bacteremia due to Escherichia coli   • Biliary obstruction   • Thrombocytopenia (CMS/HCC)   • Anemia due to chemotherapy   • Fever   • Post-operative confusion and somnolence, likely due to oversedation   • Atrial fibrillation with RVR (CMS/HCC)   • Adult necrotizing enterocolitis (CMS/HCC)   • Encephalopathy       Therapy Treatment    Rehabilitation Treatment Summary     Row Name 19 1452             Treatment Time/Intention    Discipline  physical therapist  -LS      Document Type  therapy note (daily note)  -LS      Subjective Information  complains of;pain  -LS      Mode of Treatment  physical therapy  -LS      Patient Effort  adequate  -LS      Existing Precautions/Restrictions  fall;oxygen therapy device and L/min;other (see comments) Jassi drain; abd incision; ostomy  -LS      Treatment  Considerations/Comments  Lethargy persists; constant cues for keeping eyes open and attending to task.  -LS      Recorded by [LS] Yakelin Alberts, PT 01/11/19 1614      Row Name 01/11/19 1452             Vital Signs    Pre Systolic BP Rehab  144  -LS      Pre Treatment Diastolic BP  84  -LS      Post Systolic BP Rehab  126  -LS      Post Treatment Diastolic BP  77  -LS      Pretreatment Heart Rate (beats/min)  84  -LS      Posttreatment Heart Rate (beats/min)  89  -LS      Pre SpO2 (%)  98  -LS      O2 Delivery Pre Treatment  supplemental O2  -LS      Post SpO2 (%)  95  -LS      O2 Delivery Post Treatment  supplemental O2  -LS      Pre Patient Position  Supine  -LS      Intra Patient Position  Standing  -LS      Post Patient Position  Sitting  -LS      Recorded by [LS] Yakelin Alberts, PT 01/11/19 1614      Row Name 01/11/19 1452             Cognitive Assessment/Intervention- PT/OT    Affect/Mental Status (Cognitive)  low arousal/lethargic  -LS      Orientation Status (Cognition)  oriented to;person  -LS      Follows Commands (Cognition)  follows one step commands;0-24% accuracy  -LS      Cognitive Function (Cognitive)  safety deficit  -LS      Safety Deficit (Cognitive)  severe deficit;insight into deficits/self awareness;safety precautions awareness  -LS      Personal Safety Interventions  fall prevention program maintained;gait belt;nonskid shoes/slippers when out of bed  -LS      Recorded by [LS] Yakelin Alberts, PT 01/11/19 1614      Row Name 01/11/19 1452             Bed Mobility Assessment/Treatment    Supine-Sit [place] (Bed Mobility)  maximum assist (25% patient effort);2 person assist;verbal cues  -LS      Assistive Device (Bed Mobility)  bed rails;draw sheet;head of bed elevated  -LS      Recorded by [LS] Yakelin Alberts, PT 01/11/19 1614      Row Name 01/11/19 1452             Transfer Assessment/Treatment    Comment (Transfers)  STS x2 from EOB; pt/tech on either side of pt for blocking feet and  providing BUE support. Increased assist req'd for pivot to recliner.   -LS      Recorded by [LS] Yakelin Alberts, PT 01/11/19 1614      Row Name 01/11/19 1452             Bed-Chair Transfer    Bed-Chair [place] (Transfers)  maximum assist (25% patient effort);2 person assist;verbal cues  -LS      Recorded by [LS] Yakelin Alberts, PT 01/11/19 1614      Row Name 01/11/19 1452             Sit-Stand Transfer    Sit-Stand [place] (Transfers)  moderate assist (50% patient effort);2 person assist;verbal cues  -LS      Recorded by [LS] Yakelin Alberts, PT 01/11/19 1614      Row Name 01/11/19 1452             Stand-Sit Transfer    Stand-Sit [place] (Transfers)  moderate assist (50% patient effort);2 person assist;verbal cues  -LS      Recorded by [LS] Yakelin Alberts, PT 01/11/19 1614      Row Name 01/11/19 1452             Gait/Stairs Assessment/Training    [place] Level (Gait)  unable to assess  -LS      Recorded by [LS] Yakelin Alberts, PT 01/11/19 1614      Row Name 01/11/19 1452             Therapeutic Exercise    Therapeutic Exercise  seated, lower extremities  -LS      81847 - PT Therapeutic Exercise Minutes  3  -LS      04220 - PT Therapeutic Activity Minutes  14  -LS      Recorded by [LS] Yakelin Alberts, PT 01/11/19 1614      Row Name 01/11/19 1452             Lower Extremity Seated Therapeutic Exercise    Performed, Seated Lower Extremity (Therapeutic Exercise)  hip flexion/extension;LAQ (long arc quad), knee extension;ankle dorsiflexion/plantarflexion  -LS      Exercise Type, Seated Lower Extremity (Therapeutic Exercise)  PROM (passive range of motion)  -LS      Sets/Reps Detail, Seated Lower Extremity (Therapeutic Exercise)  1/10 BLE; constant vc's for keeping eyes open and attending  -LS      Recorded by [LS] Yakelin Alberts, PT 01/11/19 1614      Row Name 01/11/19 1452             Static Sitting Balance    Level of [place] (Unsupported Sitting, Static Balance)  moderate assist, 50 to 74%  patient effort  -LS      Sitting Position (Unsupported Sitting, Static Balance)  sitting on edge of bed  -LS      Recorded by [LS] Yakelin Alberts, PT 01/11/19 1614      Row Name 01/11/19 1452             Static Standing Balance    Level of [place] (Supported Standing, Static Balance)  moderate assist, 50 to 74% patient effort x2  -LS      Recorded by [LS] Yakelin Alberts, PT 01/11/19 1614      Row Name 01/11/19 1452             Dynamic Standing Balance    Level of [place], Reaches Outside Midline (Standing, Dynamic Balance)  maximal assist, 25 to 49% patient effort x2  -LS      Recorded by [LS] Yakelin Alberts, PT 01/11/19 1614      Row Name 01/11/19 1452             Positioning and Restraints    Pre-Treatment Position  in bed  -LS      Post Treatment Position  chair  -LS      In Chair  notified nsg;reclined;call light within reach;encouraged to call for assist;exit alarm on;with family/caregiver;RUE elevated;LUE elevated;waffle cushion;on mechanical lift sling;legs elevated;heels elevated  -LS      Recorded by [LS] Yakelin Alberts, PT 01/11/19 1614      Row Name 01/11/19 1452             Pain Assessment    Additional Documentation  Pain Scale: FACES Pre/Post-Treatment (Group)  -LS      Recorded by [LS] Yakelin Alberts, PT 01/11/19 1614      Row Name 01/11/19 1452             Pain Scale: Numbers Pre/Post-Treatment    Pain Location - Orientation  lower  -LS      Pain Location  abdomen  -LS      Pain Intervention(s)  Repositioned;Ambulation/increased activity  -LS      Recorded by [LS] Yakelin Alberts, PT 01/11/19 1614      Row Name 01/11/19 1452             Pain Scale: FACES Pre/Post-Treatment    Pain: FACES Scale, Pretreatment  2-->hurts little bit  -LS      Pain: FACES Scale, Post-Treatment  2-->hurts little bit  -LS      Recorded by [LS] Yakelin Alberts, PT 01/11/19 1614      Row Name 01/11/19 1452             Plan of Care Review    Plan of Care Reviewed With  patient;family  -LS      Recorded by [LS] Lexus  Yakelin PICKARD, PT 01/11/19 1614      Row Name 01/11/19 1452             Outcome Summary/Treatment Plan (PT)    Daily Summary of Progress (PT)  progress toward functional goals is gradual  -LS      Recorded by [LS] Yakelin Alberts MELLY, PT 01/11/19 1614        User Key  (r) = Recorded By, (t) = Taken By, (c) = Cosigned By    Initials Name Effective Dates Discipline    LS Hank Albertstheo PICKARD, PT 06/19/15 -  PT          Wound upper;midline abdomen incision (Active)   Dressing Appearance open to air 1/11/2019  8:00 AM   Closure [place];Open to air 1/11/2019  8:00 AM   Base clean;dry 1/10/2019  6:00 PM   Periwound dry;intact 1/11/2019  8:00 AM   Periwound Temperature warm 1/11/2019  8:00 AM   Periwound Skin Turgor firm 1/11/2019  8:00 AM   Drainage Amount none 1/11/2019  8:00 AM   Dressing Care, Wound open to air 1/11/2019  8:00 AM           Physical Therapy Education     Title: PT OT SLP Therapies (In Progress)     Topic: Physical Therapy (In Progress)     Point: Mobility training (In Progress)     Learning Progress Summary           Patient Acceptance, E,D, NR by LS at 1/11/2019  2:52 PM    Acceptance, E,D, NR by LS at 1/10/2019  1:50 PM    Acceptance, E,D, VU,NR by LS at 1/8/2019  8:18 AM    Acceptance, E, NR by VG at 1/4/2019  3:04 PM    Comment:  Educated on HEP and correct mechanics for safe functional mobility. Reinforcement needed d/t current cognitive status.    Acceptance, E, NR by VG at 1/3/2019  9:35 AM    Comment:  Educated on HEP and correct mechanics for safe functional mobility. Reinforcement needed d/t impaired safety awareness.    Acceptance, E,D, NR by SJ at 1/1/2019  2:22 PM   Family Acceptance, E,D, NR by LS at 1/11/2019  2:52 PM    Acceptance, E, NR by VG at 1/4/2019  3:04 PM    Comment:  Educated on HEP and correct mechanics for safe functional mobility. Reinforcement needed d/t current cognitive status.    Acceptance, E, NR by VG at 1/3/2019  9:35 AM    Comment:  Educated on HEP and correct mechanics for safe  functional mobility. Reinforcement needed d/t impaired safety awareness.   Significant Other Acceptance, E,D, VU,NR by LS at 1/8/2019  8:18 AM    Acceptance, E,D, NR by SJ at 1/1/2019  2:22 PM                   Point: Home exercise program (In Progress)     Learning Progress Summary           Patient Acceptance, E,D, NR by LS at 1/11/2019  2:52 PM    Acceptance, E,D, NR by LS at 1/10/2019  1:50 PM    Acceptance, E,D, VU,NR by LS at 1/8/2019  8:18 AM    Acceptance, E, NR by VG at 1/4/2019  3:04 PM    Comment:  Educated on HEP and correct mechanics for safe functional mobility. Reinforcement needed d/t current cognitive status.    Acceptance, E, NR by VG at 1/3/2019  9:35 AM    Comment:  Educated on HEP and correct mechanics for safe functional mobility. Reinforcement needed d/t impaired safety awareness.    Acceptance, E,D, NR by SJ at 1/1/2019  2:22 PM   Family Acceptance, E,D, NR by LS at 1/11/2019  2:52 PM    Acceptance, E, NR by VG at 1/4/2019  3:04 PM    Comment:  Educated on HEP and correct mechanics for safe functional mobility. Reinforcement needed d/t current cognitive status.    Acceptance, E, NR by VG at 1/3/2019  9:35 AM    Comment:  Educated on HEP and correct mechanics for safe functional mobility. Reinforcement needed d/t impaired safety awareness.   Significant Other Acceptance, E,D, VU,NR by LS at 1/8/2019  8:18 AM    Acceptance, E,D, NR by ILEANA at 1/1/2019  2:22 PM                   Point: Body mechanics (In Progress)     Learning Progress Summary           Patient Acceptance, E,D, NR by LS at 1/11/2019  2:52 PM    Acceptance, E,D, NR by LS at 1/10/2019  1:50 PM    Acceptance, E,D, VU,NR by LS at 1/8/2019  8:18 AM    Acceptance, E, NR by VG at 1/4/2019  3:04 PM    Comment:  Educated on HEP and correct mechanics for safe functional mobility. Reinforcement needed d/t current cognitive status.    Acceptance, E, NR by VG at 1/3/2019  9:35 AM    Comment:  Educated on HEP and correct mechanics for safe  functional mobility. Reinforcement needed d/t impaired safety awareness.    Acceptance, E,D, NR by SJ at 1/1/2019  2:22 PM   Family Acceptance, E,D, NR by LS at 1/11/2019  2:52 PM    Acceptance, E, NR by VG at 1/4/2019  3:04 PM    Comment:  Educated on HEP and correct mechanics for safe functional mobility. Reinforcement needed d/t current cognitive status.    Acceptance, E, NR by VG at 1/3/2019  9:35 AM    Comment:  Educated on HEP and correct mechanics for safe functional mobility. Reinforcement needed d/t impaired safety awareness.   Significant Other Acceptance, E,D, VU,NR by LS at 1/8/2019  8:18 AM    Acceptance, E,D, NR by ILEANA at 1/1/2019  2:22 PM                   Point: Precautions (In Progress)     Learning Progress Summary           Patient Acceptance, E,D, NR by SUMAN at 1/11/2019  2:52 PM    Acceptance, E,D, NR by LS at 1/10/2019  1:50 PM    Acceptance, E,D, VU,NR by SUMAN at 1/8/2019  8:18 AM    Acceptance, E, NR by VG at 1/4/2019  3:04 PM    Comment:  Educated on HEP and correct mechanics for safe functional mobility. Reinforcement needed d/t current cognitive status.    Acceptance, E, NR by VG at 1/3/2019  9:35 AM    Comment:  Educated on HEP and correct mechanics for safe functional mobility. Reinforcement needed d/t impaired safety awareness.    Acceptance, E,D, NR by ILEANA at 1/1/2019  2:22 PM   Family Acceptance, E,D, NR by SUMAN at 1/11/2019  2:52 PM    Acceptance, E, NR by VG at 1/4/2019  3:04 PM    Comment:  Educated on HEP and correct mechanics for safe functional mobility. Reinforcement needed d/t current cognitive status.    Acceptance, E, NR by VG at 1/3/2019  9:35 AM    Comment:  Educated on HEP and correct mechanics for safe functional mobility. Reinforcement needed d/t impaired safety awareness.   Significant Other Acceptance, E,D, VU,NR by SUMAN at 1/8/2019  8:18 AM    Acceptance, E,D, NR by ILEANA at 1/1/2019  2:22 PM                               User Key     Initials Effective Dates Name Provider Type  Discipline    SJ 06/19/15 -  Reta Iqbal, PT Physical Therapist PT    LS 06/19/15 -  Yakelin Alberts, PT Physical Therapist PT    VG 05/29/18 -  Maribeth Lynn, PT Physical Therapist PT                PT Recommendation and Plan  Anticipated Discharge Disposition (PT): skilled nursing facility  Planned Therapy Interventions (PT Eval): balance training, bed mobility training, gait training, home exercise program, patient/family education, strengthening, transfer training  Therapy Frequency (PT Clinical Impression): daily  Outcome Summary/Treatment Plan (PT)  Daily Summary of Progress (PT): progress toward functional goals is gradual  Anticipated Discharge Disposition (PT): skilled nursing facility  Plan of Care Reviewed With: patient, family  Progress: no change  Outcome Summary: Pt's active participation limited by lethargy; constant vc's for keeping eyes open and attending to task. Able to perform pivot t/f to recliner with max x2 A; noted post lean with sitting/standing balance today. Edu son re: LE HEP. Will cont PT POC.   Outcome Measures     Row Name 01/11/19 1452 01/10/19 1350 01/09/19 1417       How much help from another person do you currently need...    Turning from your back to your side while in flat bed without using bedrails?  2  -LS  2  -LS  --    Moving from lying on back to sitting on the side of a flat bed without bedrails?  2  -LS  2  -LS  --    Moving to and from a bed to a chair (including a wheelchair)?  2  -LS  2  -LS  --    Standing up from a chair using your arms (e.g., wheelchair, bedside chair)?  2  -LS  2  -LS  --    Climbing 3-5 steps with a railing?  1  -LS  1  -LS  --    To walk in hospital room?  1  -LS  2  -LS  --    AM-PAC 6 Clicks Score  10  -LS  11  -LS  --       How much help from another is currently needed...    Putting on and taking off regular lower body clothing?  --  --  1  -CL    Bathing (including washing, rinsing, and drying)  --  --  1  -CL    Toileting (which  includes using toilet bed pan or urinal)  --  --  1  -CL    Putting on and taking off regular upper body clothing  --  --  1  -CL    Taking care of personal grooming (such as brushing teeth)  --  --  1  -CL    Eating meals  --  --  1  -CL    Score  --  --  6  -CL       Functional Assessment    Outcome Measure Options  AM-PAC 6 Clicks Basic Mobility (PT)  -LS  AM-PAC 6 Clicks Basic Mobility (PT)  -LS  AM-PAC 6 Clicks Daily Activity (OT)  -CL      User Key  (r) = Recorded By, (t) = Taken By, (c) = Cosigned By    Initials Name Provider Type    Yakelin Pop, PT Physical Therapist    CL Martha Blackwood, OT Occupational Therapist         Time Calculation:   PT Charges     Row Name 01/11/19 1452             Time Calculation    Start Time  1452  -LS      PT Received On  01/11/19  -LS         Time Calculation- PT    Total Timed Code Minutes- PT  17 minute(s)  -LS         Timed Charges    38887 - PT Therapeutic Exercise Minutes  3  -LS      88929 - PT Therapeutic Activity Minutes  14  -LS        User Key  (r) = Recorded By, (t) = Taken By, (c) = Cosigned By    Initials Name Provider Type    Yakelin Pop, PT Physical Therapist        Therapy Suggested Charges     Code   Minutes Charges    26930 (CPT®) Hc Pt Neuromusc Re Education Ea 15 Min      65738 (CPT®) Hc Pt Ther Proc Ea 15 Min 3     86759 (CPT®) Hc Gait Training Ea 15 Min      42226 (CPT®) Hc Pt Therapeutic Act Ea 15 Min 14 1    71199 (CPT®) Hc Pt Manual Therapy Ea 15 Min      75831 (CPT®) Hc Pt Iontophoresis Ea 15 Min      90149 (CPT®) Hc Pt Elec Stim Ea-Per 15 Min      05571 (CPT®) Hc Pt Ultrasound Ea 15 Min      25584 (CPT®) Hc Pt Self Care/Mgmt/Train Ea 15 Min      57100 (CPT®) Hc Pt Prosthetic (S) Train Initial Encounter, Each 15 Min      85394 (CPT®) Hc Pt Orthotic(S)/Prosthetic(S) Encounter, Each 15 Min      40343 (CPT®) Hc Orthotic(S) Mgmt/Train Initial Encounter, Each 15min      Total  17 1        Therapy Charges for Today     Code Description Service  Date Service Provider Modifiers Qty    85283142439 HC PT THERAPEUTIC ACT EA 15 MIN 1/10/2019 Yakelin Alberts, PT GP 1    72569481634 HC PT THER PROC EA 15 MIN 1/10/2019 Yakelin Alberts, PT GP 1    69720861090 HC PT THER SUPP EA 15 MIN 1/10/2019 Yakelin Alberts, PT GP 2    13801282267 HC PT THERAPEUTIC ACT EA 15 MIN 1/11/2019 Yakelin Alberts, PT GP 1    25888108426 HC PT THER SUPP EA 15 MIN 1/11/2019 Yakelin Alberts, PT GP 1          PT G-Codes  Outcome Measure Options: AM-PAC 6 Clicks Basic Mobility (PT)  AM-PAC 6 Clicks Score: 10  Score: 6    Yakelin Alberts, PT  1/11/2019

## 2019-01-12 LAB
ANION GAP SERPL CALCULATED.3IONS-SCNC: 4 MMOL/L (ref 3–11)
BUN BLD-MCNC: 27 MG/DL (ref 9–23)
BUN/CREAT SERPL: 30.7 (ref 7–25)
CALCIUM SPEC-SCNC: 7.7 MG/DL (ref 8.7–10.4)
CHLORIDE SERPL-SCNC: 100 MMOL/L (ref 99–109)
CO2 SERPL-SCNC: 26 MMOL/L (ref 20–31)
CREAT BLD-MCNC: 0.88 MG/DL (ref 0.6–1.3)
GFR SERPL CREATININE-BSD FRML MDRD: 86 ML/MIN/1.73
GLUCOSE BLD-MCNC: 183 MG/DL (ref 70–100)
GLUCOSE BLDC GLUCOMTR-MCNC: 141 MG/DL (ref 70–130)
GLUCOSE BLDC GLUCOMTR-MCNC: 163 MG/DL (ref 70–130)
GLUCOSE BLDC GLUCOMTR-MCNC: 209 MG/DL (ref 70–130)
MAGNESIUM SERPL-MCNC: 2.2 MG/DL (ref 1.3–2.7)
PHOSPHATE SERPL-MCNC: 4 MG/DL (ref 2.4–5.1)
POTASSIUM BLD-SCNC: 4 MMOL/L (ref 3.5–5.5)
SODIUM BLD-SCNC: 130 MMOL/L (ref 132–146)

## 2019-01-12 PROCEDURE — 94799 UNLISTED PULMONARY SVC/PX: CPT

## 2019-01-12 PROCEDURE — 83735 ASSAY OF MAGNESIUM: CPT

## 2019-01-12 PROCEDURE — 25010000002 ONDANSETRON PER 1 MG: Performed by: SURGERY

## 2019-01-12 PROCEDURE — 25010000002 MEROPENEM: Performed by: INTERNAL MEDICINE

## 2019-01-12 PROCEDURE — 25010000002 HYDROMORPHONE PER 4 MG: Performed by: INTERNAL MEDICINE

## 2019-01-12 PROCEDURE — 80048 BASIC METABOLIC PNL TOTAL CA: CPT

## 2019-01-12 PROCEDURE — 25010000002 ENOXAPARIN PER 10 MG: Performed by: INTERNAL MEDICINE

## 2019-01-12 PROCEDURE — 63710000001 INSULIN REGULAR HUMAN PER 5 UNITS: Performed by: NURSE PRACTITIONER

## 2019-01-12 PROCEDURE — 84100 ASSAY OF PHOSPHORUS: CPT

## 2019-01-12 PROCEDURE — 94760 N-INVAS EAR/PLS OXIMETRY 1: CPT

## 2019-01-12 PROCEDURE — 94640 AIRWAY INHALATION TREATMENT: CPT

## 2019-01-12 PROCEDURE — 25010000002 MAGNESIUM SULFATE PER 500 MG OF MAGNESIUM

## 2019-01-12 PROCEDURE — 25010000002 LINEZOLID 600 MG/300ML SOLUTION: Performed by: INTERNAL MEDICINE

## 2019-01-12 PROCEDURE — 82962 GLUCOSE BLOOD TEST: CPT

## 2019-01-12 PROCEDURE — 99232 SBSQ HOSP IP/OBS MODERATE 35: CPT | Performed by: INTERNAL MEDICINE

## 2019-01-12 PROCEDURE — 25010000002 POTASSIUM CHLORIDE PER 2 MEQ OF POTASSIUM

## 2019-01-12 PROCEDURE — 25010000002 CALCIUM GLUCONATE PER 10 ML

## 2019-01-12 RX ADMIN — FLECAINIDE ACETATE 50 MG: 50 TABLET ORAL at 09:35

## 2019-01-12 RX ADMIN — MEROPENEM 1 G: 1 INJECTION, POWDER, FOR SOLUTION INTRAVENOUS at 00:25

## 2019-01-12 RX ADMIN — ONDANSETRON 4 MG: 2 INJECTION INTRAMUSCULAR; INTRAVENOUS at 09:34

## 2019-01-12 RX ADMIN — INSULIN HUMAN 4 UNITS: 100 INJECTION, SOLUTION PARENTERAL at 18:02

## 2019-01-12 RX ADMIN — ENOXAPARIN SODIUM 90 MG: 100 INJECTION SUBCUTANEOUS at 20:43

## 2019-01-12 RX ADMIN — IPRATROPIUM BROMIDE AND ALBUTEROL SULFATE 3 ML: 2.5; .5 SOLUTION RESPIRATORY (INHALATION) at 15:58

## 2019-01-12 RX ADMIN — IPRATROPIUM BROMIDE AND ALBUTEROL SULFATE 3 ML: 2.5; .5 SOLUTION RESPIRATORY (INHALATION) at 07:22

## 2019-01-12 RX ADMIN — IPRATROPIUM BROMIDE AND ALBUTEROL SULFATE 3 ML: 2.5; .5 SOLUTION RESPIRATORY (INHALATION) at 11:51

## 2019-01-12 RX ADMIN — SMOFLIPID 200 ML: 6; 6; 5; 3 INJECTION, EMULSION INTRAVENOUS at 17:57

## 2019-01-12 RX ADMIN — ACETAMINOPHEN 500 MG: 500 TABLET, FILM COATED ORAL at 18:20

## 2019-01-12 RX ADMIN — CALCIUM GLUCONATE: 94 INJECTION, SOLUTION INTRAVENOUS at 18:02

## 2019-01-12 RX ADMIN — ACETAMINOPHEN 500 MG: 500 TABLET, FILM COATED ORAL at 09:47

## 2019-01-12 RX ADMIN — MEROPENEM 1 G: 1 INJECTION, POWDER, FOR SOLUTION INTRAVENOUS at 09:47

## 2019-01-12 RX ADMIN — LINEZOLID 600 MG: 600 INJECTION, SOLUTION INTRAVENOUS at 10:41

## 2019-01-12 RX ADMIN — INSULIN HUMAN 2 UNITS: 100 INJECTION, SOLUTION PARENTERAL at 05:56

## 2019-01-12 RX ADMIN — BUDESONIDE 0.5 MG: 0.5 INHALANT RESPIRATORY (INHALATION) at 19:41

## 2019-01-12 RX ADMIN — METOPROLOL TARTRATE 25 MG: 25 TABLET ORAL at 09:36

## 2019-01-12 RX ADMIN — ACETAMINOPHEN 500 MG: 500 TABLET, FILM COATED ORAL at 22:36

## 2019-01-12 RX ADMIN — SODIUM CHLORIDE, PRESERVATIVE FREE 10 ML: 5 INJECTION INTRAVENOUS at 22:12

## 2019-01-12 RX ADMIN — METOPROLOL TARTRATE 25 MG: 25 TABLET ORAL at 20:43

## 2019-01-12 RX ADMIN — BUDESONIDE 0.5 MG: 0.5 INHALANT RESPIRATORY (INHALATION) at 07:22

## 2019-01-12 RX ADMIN — INSULIN HUMAN 4 UNITS: 100 INJECTION, SOLUTION PARENTERAL at 00:25

## 2019-01-12 RX ADMIN — MEROPENEM 1 G: 1 INJECTION, POWDER, FOR SOLUTION INTRAVENOUS at 16:59

## 2019-01-12 RX ADMIN — ONDANSETRON 4 MG: 2 INJECTION INTRAMUSCULAR; INTRAVENOUS at 22:34

## 2019-01-12 RX ADMIN — INSULIN HUMAN 4 UNITS: 100 INJECTION, SOLUTION PARENTERAL at 12:04

## 2019-01-12 RX ADMIN — HYDROMORPHONE HYDROCHLORIDE 0.5 MG: 1 INJECTION, SOLUTION INTRAMUSCULAR; INTRAVENOUS; SUBCUTANEOUS at 03:05

## 2019-01-12 RX ADMIN — ENOXAPARIN SODIUM 90 MG: 100 INJECTION SUBCUTANEOUS at 09:34

## 2019-01-12 RX ADMIN — MICAFUNGIN SODIUM 100 MG: 20 INJECTION, POWDER, LYOPHILIZED, FOR SOLUTION INTRAVENOUS at 20:44

## 2019-01-12 RX ADMIN — SODIUM CHLORIDE, PRESERVATIVE FREE 10 ML: 5 INJECTION INTRAVENOUS at 10:45

## 2019-01-12 RX ADMIN — PANTOPRAZOLE SODIUM 40 MG: 40 INJECTION, POWDER, FOR SOLUTION INTRAVENOUS at 05:55

## 2019-01-12 RX ADMIN — FLECAINIDE ACETATE 50 MG: 50 TABLET ORAL at 20:43

## 2019-01-12 RX ADMIN — IPRATROPIUM BROMIDE AND ALBUTEROL SULFATE 3 ML: 2.5; .5 SOLUTION RESPIRATORY (INHALATION) at 19:41

## 2019-01-12 RX ADMIN — LINEZOLID 600 MG: 600 INJECTION, SOLUTION INTRAVENOUS at 22:12

## 2019-01-12 NOTE — PLAN OF CARE
Problem: Patient Care Overview  Goal: Plan of Care Review  Outcome: Ongoing (interventions implemented as appropriate)   01/12/19 4798   OTHER   Outcome Summary Pt continues to improve. More awake, more interactive. Follows commands. RODRIGUEZ. OOB to recliner with assist of 2 and gait belt. Back to bed with assist of mechanical lifft. On room air with Sats in [id number]. Edema visibly improved, UOP increased. MADDY drain with creamy yellowish putput but amount lessened. Temp 37.2-37.7 via Hassan temp. Tylenol given for pain.   Coping/Psychosocial   Plan of Care Reviewed With patient;spouse   Plan of Care Review   Progress improving       Problem: Fall Risk (Adult)  Goal: Absence of Fall  Outcome: Ongoing (interventions implemented as appropriate)      Problem: Skin Injury Risk (Adult)  Goal: Skin Health and Integrity  Outcome: Ongoing (interventions implemented as appropriate)      Problem: Pain, Acute (Adult)  Goal: Acceptable Pain Control/Comfort Level  Outcome: Ongoing (interventions implemented as appropriate)

## 2019-01-12 NOTE — PLAN OF CARE
Problem: Patient Care Overview  Goal: Plan of Care Review  Outcome: Ongoing (interventions implemented as appropriate)   01/12/19 0332   OTHER   Outcome Summary Pt VSS this shift. He continues to have low-grade fever ranging 99.4-100.6 F this shift, measured via bladder. He is more alert s/p his percutaneous draining of peritoneal abscess today. Skin color much improved, abdomen is softer, less tender, and patient describes less pain after procedure. He is following most verbal commands now and able to vocalize more than in previous shifts. He continues to have some disorientation to place and situation, but is oriented to self and knows he is in the hospital in [place]. New MADDY drain patent with ~40-60ml Q2 hours. Catheter in place with increased urine output compared to prior shifts. Has requested pain medication twice this shift and has shown no neurologic decline or increased somnolence as a result of this. Overall patient is much improved from last night with this nurse. Will continue to monitor progress.    Coping/Psychosocial   Plan of Care Reviewed With patient;daughter   Plan of Care Review   Progress improving       Problem: Fall Risk (Adult)  Goal: Absence of Fall  Outcome: Ongoing (interventions implemented as appropriate)   01/12/19 0332   Fall Risk (Adult)   Absence of Fall making progress toward outcome       Problem: Skin Injury Risk (Adult)  Goal: Skin Health and Integrity  Outcome: Ongoing (interventions implemented as appropriate)   01/12/19 0332   Skin Injury Risk (Adult)   Skin Health and Integrity making progress toward outcome       Problem: Pain, Acute (Adult)  Goal: Acceptable Pain Control/Comfort Level  Outcome: Ongoing (interventions implemented as appropriate)   01/12/19 0332   Pain, Acute (Adult)   Acceptable Pain Control/Comfort Level making progress toward outcome. Continues to require Dilaudid intermittently. He has had a total of 1.5 mg in the last 18 hours.

## 2019-01-12 NOTE — PLAN OF CARE
Problem: Patient Care Overview  Goal: Plan of Care Review  Outcome: Ongoing (interventions implemented as appropriate)   01/11/19 1928   OTHER   Outcome Summary Pt remains stable. Alert, following simple commands and answering simple questions. Had CT guided aspiration of abdominal abcess today and tolerated well. Was noted to have 1.8L of fluid drained immidiately at time of procedure and placed to bulb suction and still draining tan and milky purulent fluid. Was assisted to bedside chair today using lift and tolerated that well. Recieved pain medication once today post procedure. Continues to maintain a lowgrade fever the entire shift. will continue to mx.    Coping/Psychosocial   Plan of Care Reviewed With patient;son   Plan of Care Review   Progress improving       Problem: Fall Risk (Adult)  Goal: Absence of Fall  Outcome: Ongoing (interventions implemented as appropriate)      Problem: Skin Injury Risk (Adult)  Goal: Skin Health and Integrity  Outcome: Ongoing (interventions implemented as appropriate)      Problem: Pain, Acute (Adult)  Goal: Acceptable Pain Control/Comfort Level  Outcome: Ongoing (interventions implemented as appropriate)

## 2019-01-12 NOTE — PROGRESS NOTES
Pulmonary/Critical Care Follow-up     LOS: 12 days   Patient Care Team:  Adiel Denney MD as PCP - General  Loida Campbell APRN as Nurse Practitioner (Nurse Practitioner)    Chief Complaint / reason : Pancreatic cancer / medical management of post-operative conditions including diabetes, atrial fibrillation.        Subjective    68-year-old male with a history of biliary tract obstruction who underwent cholecystectomy last August with subsequent ERCP for biliary stricture with stent placement.  He subsequently had an FNA on September 28 which was suspicious for adenocarcinoma in the head of the pancreas.  He had a repeat ERCP on 10/1/2018 and a new stent was placed.  He has been treated multiple times for recurrent bacteremias associated with his biliary obstruction.  He was subsequently treated with neoadjuvant chemotherapy.    The patient underwent a Whipple procedure with wedge biopsy of liver tumor and portal vein resection/lateral repair with gastrojejunostomy tube placement by Dr. Brody on December 31, 2018.  He is transferred to the intensive care unit on January 5 with increasing confusion and tachypnea and a CT scan showing portal venous gas, pneumatosis/ileus and intra-abdominal fluid.    Interval History:   Patient had his abdominal abscess drain placed yesterday with 1.8 L out initially of purulent material.  He is doing much better today.  He is more alert and active.  Fever curve is improved.  No nausea or vomiting.  No new complaints.     History taken from: Patient, staff.    PMH/FH/Social History were reviewed and updated appropriately in the electronic medical record.     Review of Systems:    Review of 14 systems was completed with positives and pertinent negatives noted in the subjective section.  All other systems reviewed and are negative.       Objective     Vital Signs  Temp:  [98.8 °F (37.1 °C)-100.2 °F (37.9 °C)] 99 °F (37.2 °C)  Heart Rate:  [74-97] 80  Resp:  [16-24]  18  BP: (107-159)/(63-88) 134/88  01/11 0701 - 01/12 0700  In: 3400.5 [I.V.:441.2]  Out: 5270 [Urine:3150; Drains:2120]  Body mass index is 29.96 kg/m².     Physical Exam:     Constitutional:   Awake, cooperative, in no acute distress   Head:   Normocephalic, without obvious abnormality, atraumatic   Eyes:           Lids and lashes normal, conjunctivae and sclerae normal.  No icterus, no pallor, corneas clear, PER   ENMT:  Ears appear intact with no abnormalities noted     No oral lesions, no thrush, oral mucosa moist   Neck:  No adenopathy, supple, trachea midline, no thyromegaly, no JVD   Lungs/Resp:    Normal effort, symmetric chest rise, no crepitus, clear to      auscultation bilaterally, no chest wall tenderness                Heart/CV:   Regular rhythm and normal rate, normal S1 and S2, no            murmur   Abdomen/GI:    Positive bowel sounds, midline wound with staples intact, no masses, no organomegaly, soft, mildly tender, RUQ drain in place, non-distended   :    Deferred   Extremities/MSK:  No clubbing or cyanosis.  No edema.  Normal tone.    No deformities.    Pulses:  Pulses palpable and equal bilaterally   Skin:  No bleeding, bruising or rash   Heme/Lymph:  No cervical or supraclavicular adenopathy.   Neurologic:      Psychiatric:    Moves all extremities with no obvious focal motor deficit.  Cranial nerves 2 - 12 grossly intact, mild tremor.     Awake, normal affect.      Results Review:     I reviewed the patient's new clinical results.   Results from last 7 days   Lab Units  01/12/19   0421  01/11/19   0416  01/10/19   0521  01/09/19   0417   01/08/19   0311   SODIUM mmol/L  130*  132  129*  131*   --   131*   POTASSIUM mmol/L  4.0  4.2  3.7  3.7   < >  3.3*   CHLORIDE mmol/L  100  101  101  102   --   101   CO2 mmol/L  26.0  24.0  23.0  22.0   --   23.0   BUN mg/dL  27*  28*  27*  25*   --   28*   CREATININE mg/dL  0.88  0.80  0.81  0.92   --   0.88   CALCIUM mg/dL  7.7*  7.5*  7.9*  8.0*   --    8.1*   BILIRUBIN mg/dL   --    --   0.8  0.8   --   0.8   ALK PHOS U/L   --    --   223*  188*   --   181*   ALT (SGPT) U/L   --    --   45*  35   --   28   AST (SGOT) U/L   --    --   58*  46*   --   43*   GLUCOSE mg/dL  183*  130*  208*  246*   --   230*    < > = values in this interval not displayed.     Results from last 7 days   Lab Units  01/11/19   0416  01/10/19   0521  01/08/19   0311   01/06/19   0506   WBC 10*3/mm3  13.86*  16.22*  20.45*   < >  18.36*   HEMOGLOBIN g/dL  7.8*  8.6*  7.9*   < >  9.0*   HEMATOCRIT %  24.0*  26.4*  23.4*   < >  27.1*   PLATELETS 10*3/mm3  342  311  297   < >  279   MONOCYTES % %   --    --    --    --   5.0    < > = values in this interval not displayed.     Results from last 7 days   Lab Units  01/10/19   1634   PH, ARTERIAL pH units  7.519*   PO2 ART mm Hg  76.8*   PCO2, ARTERIAL mm Hg  28.5   HCO3 ART mmol/L  23.2     Results from last 7 days   Lab Units  01/12/19   0421  01/11/19   0416  01/10/19   0521   MAGNESIUM mg/dL  2.2  1.8  2.1   PHOSPHORUS mg/dL  4.0  3.6  3.6   procalcitonin 1/6: 6.71 -> 1/7: 3.52.         I reviewed the patient's new imaging including images and reports.  CXR 1/6/18:  IMPRESSION:  Low lung volumes with increased markings at the lung bases  Bilaterally.    CT Abd/pelvis 1/10/19:  IMPRESSION:  1. Very large, increasing subcapsular collection of the liver,  presumably enlarging biloma. Infected biloma cannot be excluded. Mildly  increased ascites compared to 1/5/2019 exam.   2. Previously noted small bowel pneumatosis has resolved. Small bowel  now appears practically normal.  3. Mild to moderate bilateral lower lobe atelectasis and very small  effusions.    Medication Review:     budesonide 0.5 mg Nebulization BID - RT   enoxaparin 1 mg/kg Subcutaneous Q12H   Fat Emulsion Fish Oil Based 200 mL Intravenous Q24H   flecainide 50 mg Oral Q12H   insulin regular 0-9 Units Subcutaneous Q6H   insulin regular 10 Units Subcutaneous Q6H    ipratropium-albuterol 3 mL Nebulization 4x Daily - RT   Linezolid 600 mg Intravenous Q12H   meropenem 1 g Intravenous Q8H   metoprolol tartrate 25 mg Oral Q12H   micafungin (MYCAMINE)  mg Intravenous Q24H   pantoprazole 40 mg Intravenous Q AM   Scopolamine 1 patch Transdermal Q72H   sodium chloride 10 mL Intravenous Q12H       Adult Central 2-in-1 TPN  Last Rate: 75 mL/hr at 01/12/19 0857   Adult Central 2-in-1 TPN     Pharmacy to Dose TPN         Assessment/Plan       Malignant neoplasm of head of pancreas (CMS/HCC)    Hyperlipidemia    COPD (chronic obstructive pulmonary disease) (CMS/HCC)    Essential hypertension    Gastroesophageal reflux disease without esophagitis    DM2 (diabetes mellitus, type 2) (CMS/HCC)    Paroxysmal atrial fibrillation    Chronic anticoagulation, Eliquis    Post-operative confusion and somnolence, likely due to oversedation    Atrial fibrillation with RVR (CMS/HCC)    Adult necrotizing enterocolitis (CMS/HCC)    Encephalopathy    67 YO with h/o DMII, CKD3, GERD, PAF on Eliquis, locally advanced pancreatic cancer with zulay-adjuvant chemotherapy admitted post Pancreaticoduodenectomy, wedge biopsy of liver, portal vein resection and lateral repair and GJ tube by Dr. Brody on 12/31/18.      Moved to ICU on 1/5/18 due to concerns of breathing difficulty, encephalopathy and findings of pneumatosis, portal venous gas and ileus on CT abdomen/pelvis and concern for sepsis.  Portal venous gas resolved on subsequent imaging.    Patient appears clinically improved after abdominal drain placement.    Findings of possible intra-abdominal fluid on follow up CT abd/pelvis.  CT guided drain placed 1/11/19.     Plan:  1. Lovenox for anticoagulation for atrial fibrilation.  Eventually transition back to Eliquis when okay with surgery.   2. Antibiotics per ID.   3. Continue scheduled regular insulin.  Continue correction insulin.   4. Continue TPN.   5. Continue flecainide.   6.  Continue  scheduled metoprolol.   7. Judicious use of narcotics for pain management.    8. PT/OT.  9. Monitor and replace electrolytes.   10.  Management of abscess drain per surgery.       Gerardo Messina MD  01/12/19  2:40 PM    *. Please note that portions of this note were completed with a voice recognition program. Efforts were made to edit the dictations, but occasionally words are mistranscribed.

## 2019-01-12 NOTE — PROGRESS NOTES
"General Surgery Daily Progress Note    01/12/19    Todd Phillips  [id number]  1950    12 Days Post-Op    Reason for Evaluation: Locally advanced pancreatic cancer  Subjective:  Up in chair for 5 hours yesterday.  More alert this morning    Objective:  /86 (BP Location: Right arm, Patient Position: Lying)   Pulse 87   Temp 99.3 °F (37.4 °C) (Bladder)   Resp 22   Ht 182.9 cm (72\")   Wt 100 kg (220 lb 14.4 oz)   SpO2 98%   BMI 29.96 kg/m²       INTAKE/OUTPUT      Intake/Output Summary (Last 24 hours) at 1/12/2019 0933  Last data filed at 1/12/2019 0857  Gross per 24 hour   Intake 3613.5 ml   Output 5665 ml   Net -2051.5 ml       General Appearance: More alert today  Eyes: Anicteric  Neck: Trachea midline   Cardiovascular:  RRR  Lungs:  Bilateral respirations unlabored   Abdomen:  Abdomen much softer.  Drain output consistent with intra-abdominal infection with draining appropriately.  Extremities:  No cyanosis or edema   Skin:  Jaundice  Neurologic: awake and conversant   Surgical Site: No signs of infection      Imaging Results (last 24 hours)     Procedure Component Value Units Date/Time    CT Guided Abscess Drain Peritoneal [[id number]] Collected:  01/11/19 1552     Updated:  01/11/19 1635    Narrative:       EXAMINATION: CT-GUIDED ABSCESS DRAIN, PERITONEAL-01/11/2019:      INDICATION: perihepatic abscess; Z74.09-Other reduced mobility;  Z01.89-Encounter for other specified special examinations;  C25.9-Malignant neoplasm of pancreas, unspecified; Z74.09-Other reduced  mobility . Perihepatic abscess.     TECHNIQUE: CT-guided drainage of a perihepatic abscess.     The radiation dose reduction device was turned on for each scan per the  ALARA (As Low as Reasonably Achievable) protocol.     COMPARISON: NONE.     FINDINGS: The patient was referred for CT-guided drainage of a  perihepatic abscess. The procedure and risks were explained to the   patient. Informed consent was obtained and signed. The patient " was  placed in the supine position upon the table. The right flank was  prepped and draped in a sterile fashion. 1% lidocaine used as a local  anesthetic. Under CT fluoroscopic guidance a 12-[place] curved catheter  was advanced into the perihepatic fluid collection. Approximately 1.8  liters of a thin greenish fluid was removed. The fluid was sent to  cytology for further analysis. No immediate complication occurred.       Impression:       CT-guided drainage placement within a perihepatic abscess.  No immediate complication occurred.     D:  01/11/2019  E:  01/11/2019           This report was finalized on 1/11/2019 4:33 PM by Dr. Tasha Guzmán MD.             Labs:  Lab Results (last 24 hours)     Procedure Component Value Units Date/Time    POC Glucose Once [[id number]]  (Abnormal) Collected:  01/12/19 0544    Specimen:  Blood Updated:  01/12/19 0544     Glucose 163 mg/dL     Basic Metabolic Panel [[id number]]  (Abnormal) Collected:  01/12/19 0421    Specimen:  Blood Updated:  01/12/19 0538     Glucose 183 mg/dL      BUN 27 mg/dL      Creatinine 0.88 mg/dL      Sodium 130 mmol/L      Potassium 4.0 mmol/L      Chloride 100 mmol/L      CO2 26.0 mmol/L      Calcium 7.7 mg/dL      eGFR Non African Amer 86 mL/min/1.73      BUN/Creatinine Ratio 30.7     Anion Gap 4.0 mmol/L     Narrative:       National Kid[employer] Guidelines    Stage     Description        GFR  1         Normal or High     90+  2         Mild decrease      60-89  3         Moderate decrease  30-59  4         Severe decrease    15-29  5         Kidney failure     <15    The MDRD GFR formula is only valid for adults with stable renal function between ages 18 and 70.    Phosphorus [[id number]]  (Normal) Collected:  01/12/19 0421    Specimen:  Blood Updated:  01/12/19 0538     Phosphorus 4.0 mg/dL     Magnesium [[id number]]  (Normal) Collected:  01/12/19 0421    Specimen:  Blood Updated:  01/12/19 0538     Magnesium 2.2 mg/dL     POC Glucose Once  [[id number]]  (Abnormal) Collected:  01/11/19 2342    Specimen:  Blood Updated:  01/11/19 2349     Glucose 209 mg/dL     Body Fluid Culture - Body Fluid, Peritoneum [[id number]] Collected:  01/11/19 1403    Specimen:  Body Fluid from Peritoneum Updated:  01/11/19 2221     Gram Stain No organisms seen      Many (4+) WBCs per low power field    POC Glucose Once [[id number]]  (Abnormal) Collected:  01/11/19 1800    Specimen:  Blood Updated:  01/11/19 1802     Glucose 139 mg/dL     Amylase, Body Fluid - Body Fluid, Peritoneum [[id number]] Collected:  01/11/19 1403    Specimen:  Body Fluid from Peritoneum Updated:  01/11/19 1638     Amylase, Fluid >4,500 U/L     Narrative:       This method has been FDA approved but not validated for body fluids. Results should be interpreted with caution.    Bilirubin, Body Fluid - Body Fluid, Peritoneum [[id number]] Collected:  01/11/19 1403    Specimen:  Body Fluid from Peritoneum Updated:  01/11/19 1425    Fungus Culture - Body Fluid, Peritoneum [[id number]] Collected:  01/11/19 1403    Specimen:  Body Fluid from Peritoneum Updated:  01/11/19 1425    Anaerobic Culture - Body Fluid, Peritoneum [[id number]] Collected:  01/11/19 1403    Specimen:  Body Fluid from Peritoneum Updated:  01/11/19 1424    Non-gynecologic Cytology [[id number]] Collected:  01/11/19 1403    Specimen:  Body Fluid from Abdominal Cavity Updated:  01/11/19 1412    aPTT [[id number]]  (Normal) Collected:  01/11/19 1105    Specimen:  Blood Updated:  01/11/19 1142     PTT 28.2 seconds     Narrative:       PTT = The equivalent PTT values for the therapeutic range of heparin levels at 0.3 to 0.5 U/ml are 55 to 70 seconds.    Protime-INR [[id number]]  (Normal) Collected:  01/11/19 1105    Specimen:  Blood Updated:  01/11/19 1142     Protime 11.0 Seconds      INR 1.05    POC Glucose Once [[id number]]  (Abnormal) Collected:  01/11/19 1120    Specimen:  Blood Updated:  01/11/19 1124     Glucose 204 mg/dL              Assessment:  Locally advanced pancreatic cancer.  Patient has shown some mild clinical improvement since drainage.    Plan:   Continue drain care  We'll trial full liquids  Continue to avoid tube feeding given previous enterocolitis area did use G-tube for bloating or nausea.  May also be used for medications.  Continue to mobilize  Next we could potentially start considering appropriate LTAC placement closer to home    Miquel Brody MD - 1/12/2019, 9:33 AM

## 2019-01-12 NOTE — PROGRESS NOTES
[facility] Progress Note        Antibiotics:  Anti-Infectives (From admission, onward)    Ordered     Dose/Rate Route Frequency Start Stop    01/07/19 0942  Linezolid (ZYVOX) 600 mg 300 mL     Ordering Provider:  Scot Higgins MD    600 mg  300 mL/hr over 60 Minutes Intravenous Every 12 Hours Scheduled 01/07/19 1100 01/17/19 0859    01/06/19 1306  meropenem (MERREM) 1 g/100 mL 0.9% NS VTB (mbp)     Scot Higgins MD reviewed the order on 01/11/19 0842.   Ordering Provider:  Scot Higgins MD    1 g  over 3 Hours Intravenous Every 8 Hours 01/06/19 1600 01/17/19 2359    01/05/19 1820  micafungin 100 mg/100 mL 0.9% NS IVPB (mbp)     Scot Higgins MD reviewed the order on 01/10/19 0719.   Ordering Provider:  Scot Higgins MD    100 mg  over 60 Minutes Intravenous Every 24 Hours 01/05/19 2000 01/17/19 0718    01/05/19 1859  meropenem (MERREM) 1 g/100 mL 0.9% NS VTB (mbp)     Ordering Provider:  Levi Bustos, RPH    1 g  over 30 Minutes Intravenous Once 01/05/19 1945 01/05/19 2039 01/05/19 1859  vancomycin 2250 mg/500 mL 0.9% NS IVPB ([facility])     Ordering Provider:  Levi Bustos RPH    2,250 mg Intravenous Once 01/05/19 1945 01/05/19 2009 01/01/19 0936  piperacillin-tazobactam (ZOSYN) 3.375 g in iso-osmotic dextrose 50 ml (premix)     Ordering Provider:  Miquel Brody MD    3.375 g  over 30 Minutes Intravenous Once 01/01/19 1030 01/01/19 1209    12/31/18 0632  ceFAZolin in dextrose (ANCEF) IVPB solution 2 g     Ordering Provider:  Miquel Brody MD    2 g  over 30 Minutes Intravenous Once 12/31/18 0634 12/31/18 0800    12/31/18 0632  metroNIDAZOLE (FLAGYL) IVPB 500 mg     Ordering Provider:  Miquel Brody MD    500 mg  over 60 Minutes Intravenous Once 12/31/18 0634 12/31/18 0830          CC: fatigue, abd pain    HPI:    Patient is a 68 y.o.  Yr old male with history of biliary tract obstruction, admitted to [facility] multiple times in the last  2 months including August 10 until August 16, underwent cholecystectomy with pathology suggesting chronic cholecystitis, had ERCP on August 14 with biliary stricture/proximal duct dilation and had stent placement.  He was readmitted August 21, 2018 until August 25, 2018 with acute sepsis, Klebsiella bacteremia, discharged with oral antibiotics.  Readmitted September 12, 2018 until September 17, 2018 with ESBL Escherichia coli bacteremia seen by infectious disease in [place] and treated with Invanz, duration of therapy unclear but approximately until September 24.  During that period of time, concern for malignancy mentioned in notes and referred to [facility] for further biopsy, done September 28.  Presented once again to [facility] emergency room September 30, 2018 with acute onset abdominal pain/nausea/vomiting.  Blood cultures positive again with  Klebsiella species and  Transferred to [facility]. 10/1/18 ERCP with evidence for obstruction/purulence and new stent placed; He was treated with rocephin/flagyl and last seen by us early October; after that, he had more definitive diagnosis of pancreatic cancer and treated with neoadjuvant chemotherapy and right chest port placement.  In addition he required TPN.  Family not aware of any other specific microbiologic diagnosis since October; he was admitted December 31 and underwent pancreaticoduodenectomy, wedge biopsy of liver tumor and portal vein resection/lateral repair with gastrojejunostomy tube placement.  Moved to ICU on January 5 with concern regarding increased confusion/Tachypnea, abnormal CT scan with pneumatosis/ileus and portal venous gas in addition to intra-abdominal fluid per description of radiology.  Chest x-ray January 6 with low lung volumes and increased markings at lung bases bilaterally but no focal parenchymal opacification per radiology.  Head CT no acute intracranial abnormality.    1/11 perc drain with  "1.8 liters fluid removed per radiology     1/12/19 seen early and sleepy;  Patient  sleepy/Confused and does not cooperate with a history or review of systems.  He answers yes/no to some simple questions but unclear reliability.  Nursing reports left arm PICC line placed January 6 and remains stable, chronic right port in the chest with no new redness/swelling or change there.  Indwelling Hassan catheter, G/J-tube, and ostomy bag over prior MADDY drain site; MADDY drain removed January 6.  No stool output since admission per nursing.  No rash.  On nasal cannula oxygen with no productive cough. LG fever at 100.9 last 24 hours     Patient does not cooperate with ROS otherwise      ROS:        1/12/19 as above, patient confused and not cooperative with review of systems          PE:   /86 (BP Location: Right arm, Patient Position: Lying)   Pulse 87   Temp 99.3 °F (37.4 °C) (Bladder)   Resp 22   Ht 182.9 cm (72\")   Wt 100 kg (220 lb 14.4 oz)   SpO2 98%   BMI 29.96 kg/m²       GENERAL: sleepy.  Nasal cannula oxygen  HEENT: Normocephalic, atraumatic.  PERRL. EOMI. No conjunctival injection. No icterus. Oropharynx clear without evidence of thrush or exudate. No evidence of peridontal disease.    NECK: Supple without nuchal rigidity. No mass.  LYMPH: No cervical, axillary or inguinal lymphadenopathy.  HEART: RRR; No murmur, rubs, gallops.   LUNGS: Diminished at bases bilaterally without wheezing, rales, rhonchi. Normal respiratory effort. Nonlabored. No dullness.  ABDOMEN: Soft, tender and distended. Positive bowel sounds managed. No rebound or guarding. NO mass or HSM.  Ostomy bag noted at right abdomen but no active drainage or fluid in bag.  Surgical site noted with no redness induration or warmth.  No mass bulge or fluctuance.  No crepitus or bulla.  Feeding tube site noted with no redness induration or warmth.  No mass bulge or fluctuance appreciated.  No crepitus or bulla.  No purulence or skin necrosis  EXT:  " No cyanosis, clubbing. No cord.  : Genitalia generally unremarkable.  With Hassan catheter.  MSK: FROM without joint effusions noted arms/legs.    SKIN: Warm and dry without cutaneous eruptions on Inspection/palpation.    NEURO: sleepy     Right chest port with no redness induration or warmth.  No crepitus or bulla.  No purulence     Left arm PICC line with no redness or purulence     No peripheral stigmata/phenomena of endocarditis        Laboratory Data    Results from last 7 days   Lab Units  01/11/19   0416  01/10/19   0521  01/08/19   0311   WBC 10*3/mm3  13.86*  16.22*  20.45*   HEMOGLOBIN g/dL  7.8*  8.6*  7.9*   HEMATOCRIT %  24.0*  26.4*  23.4*   PLATELETS 10*3/mm3  342  311  297     Results from last 7 days   Lab Units  01/12/19   0421   SODIUM mmol/L  130*   POTASSIUM mmol/L  4.0   CHLORIDE mmol/L  100   CO2 mmol/L  26.0   BUN mg/dL  27*   CREATININE mg/dL  0.88   GLUCOSE mg/dL  183*   CALCIUM mg/dL  7.7*     Results from last 7 days   Lab Units  01/10/19   0521   ALK PHOS U/L  223*   BILIRUBIN mg/dL  0.8   ALT (SGPT) U/L  45*   AST (SGOT) U/L  58*         Results from last 7 days   Lab Units  01/06/19   1406   CRP mg/dL  20.71*       Estimated Creatinine Clearance: 95.7 mL/min (by C-G formula based on SCr of 0.88 mg/dL).      Microbiology:      Radiology:  Imaging Results (last 72 hours)     Procedure Component Value Units Date/Time    XR Chest 1 View [[id number]] Collected:  01/06/19 0915     Updated:  01/07/19 1411    Narrative:          EXAMINATION: XR CHEST 1 VW - 1/6/2019     INDICATION: Z01.89-Encounter for other specified special examinations;  C25.9-Malignant neoplasm of pancreas, unspecified; Z74.09-Other reduced  mobility      COMPARISON: 01/05/2019     FINDINGS: Portable chest reveals low lung volumes. Deep line catheter on  the right with tip in the SVC. Mild increased markings at lung bases  bilaterally with degenerative changes seen within the spine.  No focal  parenchymal opacification  present.       Impression:       Low lung volumes with increased markings at the lung bases  bilaterally.     DICTATED:   1/6/2019  EDITED/ls :   1/6/2019      This report was finalized on 1/7/2019 2:09 PM by Dr. Tasha Guzmán MD.       CT Head Without Contrast [[id number]] Collected:  01/06/19 1803     Updated:  01/07/19 1410    Narrative:       EXAMINATION: CT HEAD WO CONTRAST - 1/6/2019     INDICATION:  Z01.89-Encounter for other specified special examinations;  C25.9-Malignant neoplasm of pancreas, unspecified; Z74.09-Other reduced  mobility. Mental status change.     TECHNIQUE: Multiple axial CT imaging is obtained of the head without the  administration of intravenous contrast.     The radiation dose reduction device was turned on for each scan per the  ALARA (As Low as Reasonably Achievable) protocol.     COMPARISON: 11/17/2018     FINDINGS: Atrophy of the brain with low-density area seen in the  periventricular and subcortical white matter suggesting chronic small  vessel ischemic change. No hemorrhage or hydrocephalus. No mass, mass  effect, or midline shift. No abnormal extra-axial fluid collections  identified. The bony structures reveal no evidence of osseous  abnormality. The visualized paranasal sinuses are clear. The mastoid air  cells are patent.       Impression:       Chronic changes seen within the brain with no acute  intracranial abnormality identified.     DICTATED:   1/6/2019  EDITED/ls :   1/6/2019         This report was finalized on 1/7/2019 2:08 PM by Dr. Tasha Guzmán MD.       XR Chest 1 View [[id number]] Collected:  01/05/19 1928     Updated:  01/06/19 1008    Narrative:          EXAMINATION: XR CHEST 1 VW - 1/5/2019     INDICATION:  Z01.89-Encounter for other specified special examinations;  C25.9-Malignant neoplasm of pancreas, unspecified; Z74.09-Other reduced  mobility      COMPARISON: 01/05/2019     FINDINGS: Portable chest reveals deep line catheter on the right  with  tip in the SVC. Mild increased markings at left lung base with small  left pleural effusion. Improvement seen in aeration of the lung bases in  the interval. Degenerative change is seen within the spine. Pulmonary  vascularity is within normal limits.           Impression:       Improvement seen in aeration of the lung bases in the  interval with only minimal right residual markings at the lung bases.     DICTATED:   1/5/2019  EDITED/ls :   1/5/2019      This report was finalized on 1/6/2019 10:06 AM by Dr. Tasha Guzmán MD.       CT Abdomen Pelvis Without Contrast [[id number]] Collected:  01/05/19 1645     Updated:  01/05/19 1655    Narrative:       EXAMINATION: CT ABDOMEN/PELVIS WO CONTRAST - 1/5/2019      INDICATION:  Z01.89-Encounter for other specified special examinations;  C25.9-Malignant neoplasm of pancreas, unspecified; Z74.09-Other reduced  mobility. Abdominal pain.     TECHNIQUE: Multiple axial CT imaging is obtained of the abdomen and  pelvis without the administration of oral or intravenous contrast.     The radiation dose reduction device was turned on for each scan per the  ALARA (As Low as Reasonably Achievable) protocol.     COMPARISON: NONE     FINDINGS: There are small bilateral pleural effusions. Atelectatic  change is seen in the lung bases bilaterally. Extraluminal air is  identified throughout the upper abdomen. There is fluid seen surrounding  the spleen as well as the liver. There is a small to  moderate size  hiatal hernia. There is gastric distention. There are postoperative  changes seen from Whipple procedure. There is a small collection of  fluid identified along the leftward aspect of the liver. There is some  free fluid identified near the sarbjit hepatis. There is a G-tube  identified in satisfactory position. Kidneys and adrenal glands within  normal limits. There is gaseous distention of the small bowel loops  suggesting a postoperative ileus. There is air identified  within the  mesenteric vessels. Findings are concerning for pneumatosis of several  small bowel loops centrally within the abdomen. There is fluid seen  scattered throughout the colon. Mild distention of the colon. There is  some fluid seen within the pelvis.     Pelvis: The pelvic portion of the gastrointestinal tract is within  normal limits. Fluid seen within the colon. The bladder reveals  air-fluid level likely from prior instrumentation. No pelvic adenopathy.  Bony structures reveal no evidence of osseous abnormality.       Impression:       There are small bilateral pleural effusions. There is a  fluid collection seen surrounding the liver and spleen containing air in  the fluid collection around the liver. There is air seen scattered  throughout the abdomen which may be postoperative. There is air  identified within the mesenteric vessels with findings suggesting  pneumatosis throughout scattered small bowel loops within the mid  abdomen. Fluid throughout the colon suggesting clinical presentation of  diarrhea. Gastrostomy tube is in satisfactory position.     DICTATED:   1/5/2019  EDITED/ls :   1/5/2019      This report was finalized on 1/5/2019 4:53 PM by Dr. Tasha Guzmán MD.               Impression:     --Acute postoperative leukocytosis and low-grade fever, approx , at risk for infection multiple sites including abdomen, lung, urine, IV lines, etc.  IV sites look benign at present and Blood cultures negative so far.  Urinalysis with negative leuk esterase/nitrites and UTI generally less likely at present.  Low lung volumes on chest x-ray not unexpected after abdominal surgery, currently no cough and while he is at risk for pneumonia, unable to confirm at present.  Abdomen remains primary concern with enterocolitis, postoperative fluid and on empiric antibiotics with these concerns in mind with Zyvox/Merrem and micafungin.  At risk for MDR organisms with prior history ESBL and multiple  hospitalizations, etc. WBC count trended down some 1/8-1/11. Abd CT 1/10 with fluid collection and  aspiration/drain 1/11     --Acute postoperative enterocolitis.  Recent surgery as outlined above in the setting of pancreatic cancer, prior neoadjuvant chemotherapy and tube feeding.  Tube feedings stopped and TPN restarted.  Dr. Brody following closely to determine timing/option/threshold for further surgical intervention or serial imaging/percutaneous drainage etc.  I d/w Dr Brody    --acute postop fluid collection by CT 1/10;  Perc drain/aspirate planned 1/11 per nursing     --Pancreatic cancer with prior neoadjuvant chemotherapy, indwelling chronic port and recent surgery as outlined above.     --COPD     --Diabetes type 2     --Chronic anticoagulation with history A. fib and past antiarrhythmic therapy     --Hx ESBL    PLAN:      --IV Zyvox/Merrem and Mycamine     --Check/review labs cultures and scans     --History per nursing as well     --Discussed with microbiology     --Highly complex set of issues with high risk for further serious morbidity and other serious sequela           Scot Higgins MD  1/12/2019

## 2019-01-13 LAB
ALBUMIN SERPL-MCNC: 2.53 G/DL (ref 3.2–4.8)
ALBUMIN/GLOB SERPL: 0.8 G/DL (ref 1.5–2.5)
ALP SERPL-CCNC: 177 U/L (ref 25–100)
ALT SERPL W P-5'-P-CCNC: 41 U/L (ref 7–40)
ANION GAP SERPL CALCULATED.3IONS-SCNC: 5 MMOL/L (ref 3–11)
AST SERPL-CCNC: 41 U/L (ref 0–33)
BILIRUB SERPL-MCNC: 0.4 MG/DL (ref 0.3–1.2)
BUN BLD-MCNC: 24 MG/DL (ref 9–23)
BUN/CREAT SERPL: 27.3 (ref 7–25)
CALCIUM SPEC-SCNC: 8.2 MG/DL (ref 8.7–10.4)
CHLORIDE SERPL-SCNC: 105 MMOL/L (ref 99–109)
CO2 SERPL-SCNC: 25 MMOL/L (ref 20–31)
CREAT BLD-MCNC: 0.88 MG/DL (ref 0.6–1.3)
DEPRECATED RDW RBC AUTO: 56.5 FL (ref 37–54)
ERYTHROCYTE [DISTWIDTH] IN BLOOD BY AUTOMATED COUNT: 16.9 % (ref 11.3–14.5)
GFR SERPL CREATININE-BSD FRML MDRD: 86 ML/MIN/1.73
GLOBULIN UR ELPH-MCNC: 3.1 GM/DL
GLUCOSE BLD-MCNC: 146 MG/DL (ref 70–100)
GLUCOSE BLDC GLUCOMTR-MCNC: 119 MG/DL (ref 70–130)
GLUCOSE BLDC GLUCOMTR-MCNC: 208 MG/DL (ref 70–130)
GLUCOSE BLDC GLUCOMTR-MCNC: 223 MG/DL (ref 70–130)
GLUCOSE BLDC GLUCOMTR-MCNC: 242 MG/DL (ref 70–130)
GLUCOSE BLDC GLUCOMTR-MCNC: 99 MG/DL (ref 70–130)
HCT VFR BLD AUTO: 23.8 % (ref 38.9–50.9)
HGB BLD-MCNC: 7.6 G/DL (ref 13.1–17.5)
MAGNESIUM SERPL-MCNC: 1.8 MG/DL (ref 1.3–2.7)
MCH RBC QN AUTO: 29.1 PG (ref 27–31)
MCHC RBC AUTO-ENTMCNC: 31.9 G/DL (ref 32–36)
MCV RBC AUTO: 91.2 FL (ref 80–99)
PHOSPHATE SERPL-MCNC: 3.9 MG/DL (ref 2.4–5.1)
PLATELET # BLD AUTO: 389 10*3/MM3 (ref 150–450)
PMV BLD AUTO: 9.8 FL (ref 6–12)
POTASSIUM BLD-SCNC: 4 MMOL/L (ref 3.5–5.5)
PROT SERPL-MCNC: 5.6 G/DL (ref 5.7–8.2)
RBC # BLD AUTO: 2.61 10*6/MM3 (ref 4.2–5.76)
SODIUM BLD-SCNC: 135 MMOL/L (ref 132–146)
WBC NRBC COR # BLD: 7.32 10*3/MM3 (ref 3.5–10.8)

## 2019-01-13 PROCEDURE — 36415 COLL VENOUS BLD VENIPUNCTURE: CPT | Performed by: INTERNAL MEDICINE

## 2019-01-13 PROCEDURE — 97110 THERAPEUTIC EXERCISES: CPT

## 2019-01-13 PROCEDURE — 25010000002 CALCIUM GLUCONATE PER 10 ML

## 2019-01-13 PROCEDURE — 84100 ASSAY OF PHOSPHORUS: CPT

## 2019-01-13 PROCEDURE — 99232 SBSQ HOSP IP/OBS MODERATE 35: CPT | Performed by: INTERNAL MEDICINE

## 2019-01-13 PROCEDURE — 25010000002 HYDROMORPHONE PER 4 MG: Performed by: INTERNAL MEDICINE

## 2019-01-13 PROCEDURE — 25010000002 MAGNESIUM SULFATE PER 500 MG OF MAGNESIUM

## 2019-01-13 PROCEDURE — 94799 UNLISTED PULMONARY SVC/PX: CPT

## 2019-01-13 PROCEDURE — 82962 GLUCOSE BLOOD TEST: CPT

## 2019-01-13 PROCEDURE — 25010000002 LINEZOLID 600 MG/300ML SOLUTION: Performed by: INTERNAL MEDICINE

## 2019-01-13 PROCEDURE — 85027 COMPLETE CBC AUTOMATED: CPT | Performed by: INTERNAL MEDICINE

## 2019-01-13 PROCEDURE — 25010000002 ENOXAPARIN PER 10 MG: Performed by: INTERNAL MEDICINE

## 2019-01-13 PROCEDURE — 97530 THERAPEUTIC ACTIVITIES: CPT

## 2019-01-13 PROCEDURE — 25010000002 POTASSIUM CHLORIDE PER 2 MEQ OF POTASSIUM

## 2019-01-13 PROCEDURE — 94640 AIRWAY INHALATION TREATMENT: CPT

## 2019-01-13 PROCEDURE — 25010000002 MEROPENEM: Performed by: INTERNAL MEDICINE

## 2019-01-13 PROCEDURE — 94760 N-INVAS EAR/PLS OXIMETRY 1: CPT

## 2019-01-13 PROCEDURE — 80053 COMPREHEN METABOLIC PANEL: CPT | Performed by: INTERNAL MEDICINE

## 2019-01-13 PROCEDURE — 83735 ASSAY OF MAGNESIUM: CPT

## 2019-01-13 RX ORDER — IPRATROPIUM BROMIDE AND ALBUTEROL SULFATE 2.5; .5 MG/3ML; MG/3ML
3 SOLUTION RESPIRATORY (INHALATION)
Status: DISCONTINUED | OUTPATIENT
Start: 2019-01-13 | End: 2019-01-15 | Stop reason: HOSPADM

## 2019-01-13 RX ORDER — HYDROMORPHONE HYDROCHLORIDE 1 MG/ML
0.25 INJECTION, SOLUTION INTRAMUSCULAR; INTRAVENOUS; SUBCUTANEOUS EVERY 4 HOURS PRN
Status: DISCONTINUED | OUTPATIENT
Start: 2019-01-13 | End: 2019-01-15 | Stop reason: HOSPADM

## 2019-01-13 RX ORDER — HYDROCODONE BITARTRATE AND ACETAMINOPHEN 5; 325 MG/1; MG/1
2 TABLET ORAL EVERY 6 HOURS PRN
Status: DISCONTINUED | OUTPATIENT
Start: 2019-01-13 | End: 2019-01-15 | Stop reason: HOSPADM

## 2019-01-13 RX ADMIN — METOPROLOL TARTRATE 25 MG: 25 TABLET ORAL at 08:17

## 2019-01-13 RX ADMIN — ENOXAPARIN SODIUM 90 MG: 100 INJECTION SUBCUTANEOUS at 20:23

## 2019-01-13 RX ADMIN — IPRATROPIUM BROMIDE AND ALBUTEROL SULFATE 3 ML: 2.5; .5 SOLUTION RESPIRATORY (INHALATION) at 20:30

## 2019-01-13 RX ADMIN — HYDROMORPHONE HYDROCHLORIDE 0.25 MG: 1 INJECTION, SOLUTION INTRAMUSCULAR; INTRAVENOUS; SUBCUTANEOUS at 00:15

## 2019-01-13 RX ADMIN — LINEZOLID 600 MG: 600 INJECTION, SOLUTION INTRAVENOUS at 21:07

## 2019-01-13 RX ADMIN — MEROPENEM 1 G: 1 INJECTION, POWDER, FOR SOLUTION INTRAVENOUS at 08:15

## 2019-01-13 RX ADMIN — LINEZOLID 600 MG: 600 INJECTION, SOLUTION INTRAVENOUS at 08:20

## 2019-01-13 RX ADMIN — ENOXAPARIN SODIUM 90 MG: 100 INJECTION SUBCUTANEOUS at 08:18

## 2019-01-13 RX ADMIN — PANTOPRAZOLE SODIUM 40 MG: 40 INJECTION, POWDER, FOR SOLUTION INTRAVENOUS at 05:23

## 2019-01-13 RX ADMIN — MICAFUNGIN SODIUM 100 MG: 20 INJECTION, POWDER, LYOPHILIZED, FOR SOLUTION INTRAVENOUS at 21:09

## 2019-01-13 RX ADMIN — INSULIN HUMAN 4 UNITS: 100 INJECTION, SOLUTION PARENTERAL at 06:04

## 2019-01-13 RX ADMIN — SMOFLIPID 100 ML: 6; 6; 5; 3 INJECTION, EMULSION INTRAVENOUS at 18:39

## 2019-01-13 RX ADMIN — SODIUM CHLORIDE, PRESERVATIVE FREE 10 ML: 5 INJECTION INTRAVENOUS at 08:20

## 2019-01-13 RX ADMIN — BUDESONIDE 0.5 MG: 0.5 INHALANT RESPIRATORY (INHALATION) at 20:30

## 2019-01-13 RX ADMIN — BUDESONIDE 0.5 MG: 0.5 INHALANT RESPIRATORY (INHALATION) at 08:36

## 2019-01-13 RX ADMIN — SCOPALAMINE 1 PATCH: 1 PATCH, EXTENDED RELEASE TRANSDERMAL at 10:35

## 2019-01-13 RX ADMIN — HYDROCODONE BITARTRATE AND ACETAMINOPHEN 2 TABLET: 5; 325 TABLET ORAL at 15:13

## 2019-01-13 RX ADMIN — IPRATROPIUM BROMIDE AND ALBUTEROL SULFATE 3 ML: 2.5; .5 SOLUTION RESPIRATORY (INHALATION) at 08:36

## 2019-01-13 RX ADMIN — FLECAINIDE ACETATE 50 MG: 50 TABLET ORAL at 20:22

## 2019-01-13 RX ADMIN — METOPROLOL TARTRATE 25 MG: 25 TABLET ORAL at 20:22

## 2019-01-13 RX ADMIN — HYDROMORPHONE HYDROCHLORIDE 0.25 MG: 1 INJECTION, SOLUTION INTRAMUSCULAR; INTRAVENOUS; SUBCUTANEOUS at 13:42

## 2019-01-13 RX ADMIN — INSULIN HUMAN 4 UNITS: 100 INJECTION, SOLUTION PARENTERAL at 12:06

## 2019-01-13 RX ADMIN — MEROPENEM 1 G: 1 INJECTION, POWDER, FOR SOLUTION INTRAVENOUS at 00:20

## 2019-01-13 RX ADMIN — HYDROCODONE BITARTRATE AND ACETAMINOPHEN 2 TABLET: 5; 325 TABLET ORAL at 09:12

## 2019-01-13 RX ADMIN — HYDROMORPHONE HYDROCHLORIDE 0.25 MG: 1 INJECTION, SOLUTION INTRAMUSCULAR; INTRAVENOUS; SUBCUTANEOUS at 05:23

## 2019-01-13 RX ADMIN — MEROPENEM 1 G: 1 INJECTION, POWDER, FOR SOLUTION INTRAVENOUS at 15:12

## 2019-01-13 RX ADMIN — FLECAINIDE ACETATE 50 MG: 50 TABLET ORAL at 08:17

## 2019-01-13 RX ADMIN — SODIUM CHLORIDE, PRESERVATIVE FREE 10 ML: 5 INJECTION INTRAVENOUS at 23:33

## 2019-01-13 RX ADMIN — MEROPENEM 1 G: 1 INJECTION, POWDER, FOR SOLUTION INTRAVENOUS at 23:34

## 2019-01-13 RX ADMIN — MAGNESIUM SULFATE HEPTAHYDRATE 4 G: 40 INJECTION, SOLUTION INTRAVENOUS at 06:04

## 2019-01-13 RX ADMIN — CALCIUM GLUCONATE: 94 INJECTION, SOLUTION INTRAVENOUS at 18:38

## 2019-01-13 NOTE — PLAN OF CARE
Problem: Patient Care Overview  Goal: Plan of Care Review  Outcome: Ongoing (interventions implemented as appropriate)   01/13/19 0311   OTHER   Outcome Summary Patient VSS this shift, cardiac rhythm SR without atrial fibrillation. Patient is much more alert, talkative, and receptive to conversation. He is oriented to himself, the fact that he's in the hospital, and he knows that he had surgery for pancreatic cancer. He remains confused to time and which hospital he is in. He is following all commands, able to assist with turning, positioning. Generalized edema is much improved and patient continues to have increased urine output. Patient was taken off of pain medication yesterday and around midnight pain became unbearable. APRN ordered 0.25 mg Dilaudid Q4 IVP, first dose given with relief. Some nausea through the night without vomiting. Relieved with G-Tube to gravity. Requiring no O2. MADDY patent, bulb changed. Will continue to monitor.    Coping/Psychosocial   Plan of Care Reviewed With patient;spouse   Plan of Care Review   Progress improving       Problem: Fall Risk (Adult)  Goal: Absence of Fall  Outcome: Ongoing (interventions implemented as appropriate)   01/13/19 0311   Fall Risk (Adult)   Absence of Fall making progress toward outcome       Problem: Skin Injury Risk (Adult)  Goal: Skin Health and Integrity  Outcome: Ongoing (interventions implemented as appropriate)   01/13/19 0311   Skin Injury Risk (Adult)   Skin Health and Integrity making progress toward outcome       Problem: Pain, Acute (Adult)  Goal: Acceptable Pain Control/Comfort Level   01/13/19 0311   Pain, Acute (Adult)   Acceptable Pain Control/Comfort Level other (see comments)  (Pain regimen changed this shift)

## 2019-01-13 NOTE — PLAN OF CARE
Problem: Patient Care Overview  Goal: Plan of Care Review  Outcome: Ongoing (interventions implemented as appropriate)   01/13/19 0410   OTHER   Outcome Summary Pt alert and answering most questions appropriately. Does follow commands, and appears to be getting more strength each day. Encouraging PO diet ordered as full liquids and has been eating poorly but trying. OOB each day and transfers with an assist x2. Continuing to mx and treat pain as well as maintaining drain. Abcess drain continues to drain tan, milky and thick fluid. Maintains O2 Sat with RA and no noted intolerance. Some tachypnea with activity but quick recovers with rest. Will continue to mx   Coping/Psychosocial   Plan of Care Reviewed With patient;spouse   Plan of Care Review   Progress improving

## 2019-01-13 NOTE — PROGRESS NOTES
Adult Nutrition  Assessment/PES    Patient Name:  Todd Phillips  YOB: 1950  MRN: [id number]  Admit Date:  12/31/2018    Assessment Date:  1/13/2019    Comments:  Pt tolerating full liquid diet approx. 25% consumed  per RN report; will decrease PN to 60 ml/hr and maintain this level until po intake improved  and tolerance  well established ; plan slow transition to po diet. Insulin orders adjusted per intensivist. RD monitoring support    Reason for Assessment     Row Name 01/13/19 1303          Reason for Assessment    Reason For Assessment  TF/PN;follow-up protocol;per organizational policy MDR; PN, po  f/u  45 mins     Diagnosis  cancer diagnosis/related complications;gastrointestinal disease pancreatic cancer, s/p Whipple procedure 12/31, wedge bx of liver tumor, portal vein resection/repair and G/J tube placed. ; SOA, ileus, pneumatosis, portal vein gas     Identified At Risk by Screening Criteria  tube feeding or parenteral nutrition         Nutrition/Diet History     Row Name 01/13/19 1303          Nutrition/Diet History    Typical Food/Fluid Intake  RN reports pt improved, more alert, eating and drinking this morning approx 25 % of breakfast mostly grits and milk.      Factors Affecting Nutritional Intake  altered gastrointestinal function           Labs/Tests/Procedures/Meds     Row Name 01/13/19 1308          Labs/Procedures/Meds    Lab Results Reviewed  reviewed, pertinent     Lab Results Comments  elev glucose         Physical Findings     Row Name 01/13/19 1308          Physical Findings    Overall Physical Appearance  -- pt up in chair            Nutrition Prescription Ordered     Row Name 01/13/19 1311          Nutrition Prescription PO    Current PO Diet  Full Liquid        Nutrition Prescription PN    PN Route  PICC     PN Goal Rate (mL/hr)  75 mL/hr     PN Goal Volume (mL)  1800 mL     Dextrose Concentration (%)  20 %     Amino Acid Concentration (%)  7 %     Lipid Concentration (%)   20%     Lipid Volume (mL)  200 mL     Lipid Frequency  Daily                 Problem/Interventions:  Problem 1     Row Name 01/13/19 1311          Nutrition Diagnoses Problem 1    Problem 1  Altered GI Function     Etiology (related to)  Medical Diagnosis     Gastrointestinal  Other (comment);Ileus s/p whipple, liver biopsy and portal vein resection/repair; PEG/J placement (12/31); infected ascites (1/11)     Signs/Symptoms (evidenced by)  Report of Mnimal PO Intake     Resolved?  Yes ileus resolved; po intake increasing gradually                 Intervention Goal     Row Name 01/13/19 1312          Intervention Goal    General  Meet nutritional needs for age/condition;Nutrition support treatment     TF/PN  Adjust TF/PN         Nutrition Intervention     Row Name 01/13/19 1313          Nutrition Intervention    RD/Tech Action  Follow Tx progress;Care plan reviewd;Recommend/ordered     Recommended/Ordered  PN         Nutrition Prescription     Row Name 01/13/19 1313          Nutrition Prescription PN    Parenteral Prescription  Parenteral to supply     PN Goal Rate (mL/hr)  60 mL/hr     PN Goal Volume (mL)  1440 mL     New PN Prescription Ordered?  Yes        PN to Supply    NPC/Day  1379 NPC/day     Kcal/Day  1779 Kcal/day     Protein (gm/day)  100 gm/day        Other Orders    Other  PN infusion rate decreased w/ increasing po intake and elevated blood glucoses; plan to maintain 60 ml/hr until po intake well established.         Education/Evaluation     Row Name 01/13/19 1316          Monitor/Evaluation    Monitor  Per protocol;I&O;Symptoms;PO intake;Pertinent labs;PN delivery/tolerance;Skin status           Electronically signed by:  Aster Coleman MS,RD,LD  01/13/19 1:18 PM

## 2019-01-13 NOTE — THERAPY TREATMENT NOTE
Acute Care - Physical Therapy Treatment Note  [facility]     Patient Name: Todd Phillips  : 1950  MRN: [id number]  Today's Date: 2019  Onset of Illness/Injury or Date of Surgery: 18  Date of Referral to PT: 19  Referring Physician: MD Mayuri    Admit Date: 2018    Visit Dx:    ICD-10-CM ICD-9-CM   1. Impaired functional mobility, balance, gait, and endurance Z74.09 V49.89   2. Laboratory test Z01.89 V72.60   3. Pancreatic cancer (CMS/HCC) C25.9 157.9   4. 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)   • DM2 (diabetes mellitus, type 2) (CMS/HCC)   • Paroxysmal atrial fibrillation   • Chronic anticoagulation, Eliquis   • Encounter for monitoring flecainide therapy   • Malignant neoplasm of head of pancreas (CMS/HCC)   • Bacteremia due to Escherichia coli   • Biliary obstruction   • Thrombocytopenia (CMS/HCC)   • Anemia due to chemotherapy   • Fever   • Post-operative confusion and somnolence, likely due to oversedation   • Atrial fibrillation with RVR (CMS/HCC)   • Adult necrotizing enterocolitis (CMS/HCC)   • Encephalopathy       Therapy Treatment    Rehabilitation Treatment Summary     Row Name 19 1320             Treatment Time/Intention    Discipline  physical therapist  -SJ      Document Type  therapy note (daily note)  -SJ      Subjective Information  complains of;pain  -SJ      Mode of Treatment  individual therapy  -SJ      Patient/Family Observations  Pt in ICU, reclined in chair, awake, had visitors who stepped out for session  -      Care Plan Review  care plan/treatment goals  reviewed;risks/benefits reviewed;current/potential barriers reviewed;patient/other agree to care plan  -SJ      Therapy Frequency (PT Clinical Impression)  daily  -SJ      Patient Effort  adequate  -SJ      Comment  limted by pain  -SJ      Existing Precautions/Restrictions  fall;other (see comments) multiple drains, tubes  -SJ      Recorded by [SJ] Reta Iqbal, PT 01/13/19 1355      Row Name 01/13/19 1320             Vital Signs    Pre Systolic BP Rehab  133  -SJ      Pre Treatment Diastolic BP  79  -SJ      Pretreatment Heart Rate (beats/min)  84  -SJ      Posttreatment Heart Rate (beats/min)  86  -SJ      Pre SpO2 (%)  97  -SJ      O2 Delivery Pre Treatment  room air  -SJ      Post SpO2 (%)  97  -SJ      O2 Delivery Post Treatment  room air  -SJ      Recorded by [SJ] Reta Iqbal, PT 01/13/19 1355      Row Name 01/13/19 1320             Cognitive Assessment/Intervention- PT/OT    Affect/Mental Status (Cognitive)  low arousal/lethargic  -SJ      Orientation Status (Cognition)  oriented to;person  -SJ      Follows Commands (Cognition)  follows one step commands;75-90% accuracy;delayed response/completion;repetition of directions required;physical/tactile prompts required  -SJ      Cognitive Function (Cognitive)  safety deficit  -SJ      Safety Deficit (Cognitive)  moderate deficit;ability to follow commands;awareness of need for assistance;insight into deficits/self awareness  -SJ      Personal Safety Interventions  fall prevention program maintained;gait belt;muscle strengthening facilitated;nonskid shoes/slippers when out of bed  -SJ      Recorded by [SJ] Reta Iqbal, PT 01/13/19 1355      Row Name 01/13/19 1320             Safety Issues, Functional Mobility    Safety Issues Affecting Function (Mobility)  awareness of need for assistance;ability to follow commands;insight into deficits/self awareness;sequencing abilities  -SJ      Impairments Affecting Function (Mobility)   balance;cognition;coordination;endurance/activity tolerance;strength;pain  -SJ      Recorded by [SJ] Reta Iqbal, PT 01/13/19 1355      Row Name 01/13/19 1320             Bed Mobility Assessment/Treatment    Comment (Bed Mobility)  UIC  -SJ      Recorded by [SJ] Reta Iqbal, PT 01/13/19 1355      Row Name 01/13/19 1320             Transfer Assessment/Treatment    Comment (Transfers)  STS x2 reps from recliner facing bedrail to allow pt to pull up into standing.   -SJ      Recorded by [SJ] Reta Iqbal, PT 01/13/19 1355      Row Name 01/13/19 1320             Sit-Stand Transfer    Sit-Stand [place] (Transfers)  contact guard;1 person assist;verbal cues  -SJ      Assistive Device (Sit-Stand Transfers)  other (see comments) bedrail  -SJ      Recorded by [ILEANA] Reta Iqbal, PT 01/13/19 1355      Row Name 01/13/19 1320             Stand-Sit Transfer    Stand-Sit [place] (Transfers)  contact guard;1 person assist  -SJ      Assistive Device (Stand-Sit Transfers)  other (see comments) bedrail  -SJ      Recorded by [SJ] Reta Iqbal, PT 01/13/19 1355      Row Name 01/13/19 1320             Gait/Stairs Assessment/Training    [place] Level (Gait)  not tested  -SJ      Comment (Gait/Stairs)  Pt having too much pain to attempt  -SJ      Recorded by [SJ] Reta Iqbal, PT 01/13/19 1355      Row Name 01/13/19 1320             Therapeutic Exercise    87597 - PT Therapeutic Exercise Minutes  6  -SJ      68908 - PT Therapeutic Activity Minutes  17  -SJ      Recorded by [SJ] Reta Iqbal, PT 01/13/19 1355      Row Name 01/13/19 1320             Lower Extremity Seated Therapeutic Exercise    Performed, Seated Lower Extremity (Therapeutic Exercise)  hip flexion/extension;ankle dorsiflexion/plantarflexion;LAQ (long arc quad), knee extension  -SJ      Exercise Type, Seated Lower Extremity (Therapeutic Exercise)  AROM (active range of motion)  -SJ      Sets/Reps Detail, Seated Lower Extremity  (Therapeutic Exercise)  1/10  -SJ      Comment, Seated Lower Extremity (Therapeutic Exercise)  pt req max cues for completing correctlyu  -SJ      Recorded by [SJ] Reta Iqbal, PT 01/13/19 1355      Row Name 01/13/19 1320             Static Sitting Balance    Level of [place] (Unsupported Sitting, Static Balance)  contact guard assist  -SJ      Sitting Position (Unsupported Sitting, Static Balance)  sitting in chair  -SJ      Time Able to Maintain Position (Unsupported Sitting, Static Balance)  more than 5 minutes  -SJ      Comment (Unsupported Sitting, Static Balance)  unsupported in chair  -SJ      Recorded by [SJ] Reta Iqbal, PT 01/13/19 1355      Row Name 01/13/19 1320             Static Standing Balance    Level of [place] (Supported Standing, Static Balance)  contact guard assist  -SJ      Time Able to Maintain Position (Supported Standing, Static Balance)  30 to 45 seconds  -SJ      Assistive Device Utilized (Supported Standing, Static Balance)  other (see comments) bedrail  -SJ      Recorded by [SJ] Reta Iqbal, PT 01/13/19 1355      Row Name 01/13/19 1320             Positioning and Restraints    Pre-Treatment Position  sitting in chair/recliner  -SJ      Post Treatment Position  chair  -SJ      In Chair  notified nsg;reclined;call light within reach;encouraged to call for assist;exit alarm on;waffle cushion;on mechanical lift sling;heels elevated  -SJ      Recorded by [SJ] Reta Iqbal, PT 01/13/19 1355      Row Name 01/13/19 1320             Pain Scale: Numbers Pre/Post-Treatment    Pain Location - Side  Left  -SJ      Pain Location - Orientation  lower  -SJ      Pain Location  abdomen  -SJ      Pain Intervention(s)  Medication (See MAR);Repositioned;Ambulation/increased activity  -SJ      Recorded by [SJ] Reta Iqbal, PT 01/13/19 1355      Row Name 01/13/19 1320             Pain Scale: Word Pre/Post-Treatment    Pain: Word Scale, Pretreatment  6 - moderate-severe pain   -SJ      Pain: Word Scale, Post-Treatment  6 - moderate-severe pain  -SJ      Recorded by [] Reta Iqbal, PT 01/13/19 1355      Row Name 01/13/19 1320             Coping    Observed Emotional State  cooperative;flat;quiet  -SJ      Verbalized Emotional State  acceptance  -SJ      Recorded by [] Reta Iqbal, PT 01/13/19 1355      Row Name 01/13/19 1320             Plan of Care Review    Plan of Care Reviewed With  patient  -SJ      Recorded by [] Reta Iqbal, PT 01/13/19 1355      Row Name 01/13/19 1320             Outcome Summary/Treatment Plan (PT)    Daily Summary of Progress (PT)  progress toward functional goals is good  -SJ      Recorded by [ILEANA] Reta Iqbal, PT 01/13/19 1356        User Key  (r) = Recorded By, (t) = Taken By, (c) = Cosigned By    Initials Name Effective Dates Discipline     Reta Iqbal, PT 06/19/15 -  PT          Wound upper;midline abdomen incision (Active)   Dressing Appearance open to air 1/13/2019  8:00 AM   Closure Staples;Open to air 1/13/2019  8:00 AM   Base clean;dry;pink 1/13/2019  8:00 AM   Periwound intact;dry 1/13/2019  8:00 AM   Periwound Temperature warm 1/13/2019  8:00 AM   Periwound Skin Turgor firm 1/13/2019  8:00 AM   Drainage Amount none 1/13/2019  8:00 AM   Dressing Care, Wound open to air 1/13/2019  8:00 AM           Physical Therapy Education     Title: PT OT SLP Therapies (In Progress)     Topic: Physical Therapy (In Progress)     Point: Mobility training (In Progress)     Learning Progress Summary           Patient Acceptance, E,D, NR by SJ at 1/13/2019  1:58 PM    Acceptance, E, NR,VU by KAYA at 1/11/2019  7:19 PM    Acceptance, E,D, NR by LS at 1/11/2019  2:52 PM    Acceptance, E,D, NR by LS at 1/10/2019  1:50 PM    Acceptance, E,D, VU,NR by LS at 1/8/2019  8:18 AM    Acceptance, E, NR by CASH at 1/4/2019  3:04 PM    Comment:  Educated on HEP and correct mechanics for safe functional mobility. Reinforcement needed d/t current cognitive  status.    Acceptance, E, NR by VG at 1/3/2019  9:35 AM    Comment:  Educated on HEP and correct mechanics for safe functional mobility. Reinforcement needed d/t impaired safety awareness.    Acceptance, E,D, NR by SJ at 1/1/2019  2:22 PM   Family Acceptance, E,D, NR by LS at 1/11/2019  2:52 PM    Acceptance, E, NR by VG at 1/4/2019  3:04 PM    Comment:  Educated on HEP and correct mechanics for safe functional mobility. Reinforcement needed d/t current cognitive status.    Acceptance, E, NR by VG at 1/3/2019  9:35 AM    Comment:  Educated on HEP and correct mechanics for safe functional mobility. Reinforcement needed d/t impaired safety awareness.   Significant Other Acceptance, E,D, VU,NR by LS at 1/8/2019  8:18 AM    Acceptance, E,D, NR by SJ at 1/1/2019  2:22 PM                   Point: Home exercise program (In Progress)     Learning Progress Summary           Patient Acceptance, E,D, NR by SJ at 1/13/2019  1:58 PM    Acceptance, E, NR,VU by KAYA at 1/11/2019  7:19 PM    Acceptance, E,D, NR by LS at 1/11/2019  2:52 PM    Acceptance, E,D, NR by LS at 1/10/2019  1:50 PM    Acceptance, E,D, VU,NR by LS at 1/8/2019  8:18 AM    Acceptance, E, NR by VG at 1/4/2019  3:04 PM    Comment:  Educated on HEP and correct mechanics for safe functional mobility. Reinforcement needed d/t current cognitive status.    Acceptance, E, NR by VG at 1/3/2019  9:35 AM    Comment:  Educated on HEP and correct mechanics for safe functional mobility. Reinforcement needed d/t impaired safety awareness.    Acceptance, E,D, NR by SJ at 1/1/2019  2:22 PM   Family Acceptance, E,D, NR by LS at 1/11/2019  2:52 PM    Acceptance, E, NR by VG at 1/4/2019  3:04 PM    Comment:  Educated on HEP and correct mechanics for safe functional mobility. Reinforcement needed d/t current cognitive status.    Acceptance, E, NR by VG at 1/3/2019  9:35 AM    Comment:  Educated on HEP and correct mechanics for safe functional mobility. Reinforcement needed d/t  impaired safety awareness.   Significant Other Acceptance, E,D, VU,NR by LS at 1/8/2019  8:18 AM    Acceptance, E,D, NR by SJ at 1/1/2019  2:22 PM                   Point: Body mechanics (In Progress)     Learning Progress Summary           Patient Acceptance, E,D, NR by SJ at 1/13/2019  1:58 PM    Acceptance, E, NR,VU by DC at 1/11/2019  7:19 PM    Acceptance, E,D, NR by LS at 1/11/2019  2:52 PM    Acceptance, E,D, NR by LS at 1/10/2019  1:50 PM    Acceptance, E,D, VU,NR by LS at 1/8/2019  8:18 AM    Acceptance, E, NR by VG at 1/4/2019  3:04 PM    Comment:  Educated on HEP and correct mechanics for safe functional mobility. Reinforcement needed d/t current cognitive status.    Acceptance, E, NR by VG at 1/3/2019  9:35 AM    Comment:  Educated on HEP and correct mechanics for safe functional mobility. Reinforcement needed d/t impaired safety awareness.    Acceptance, E,D, NR by SJ at 1/1/2019  2:22 PM   Family Acceptance, E,D, NR by LS at 1/11/2019  2:52 PM    Acceptance, E, NR by VG at 1/4/2019  3:04 PM    Comment:  Educated on HEP and correct mechanics for safe functional mobility. Reinforcement needed d/t current cognitive status.    Acceptance, E, NR by VG at 1/3/2019  9:35 AM    Comment:  Educated on HEP and correct mechanics for safe functional mobility. Reinforcement needed d/t impaired safety awareness.   Significant Other Acceptance, E,D, VU,NR by LS at 1/8/2019  8:18 AM    Acceptance, E,D, NR by SJ at 1/1/2019  2:22 PM                   Point: Precautions (In Progress)     Learning Progress Summary           Patient Acceptance, E,D, NR by SJ at 1/13/2019  1:58 PM    Acceptance, E, NR,VU by DC at 1/11/2019  7:19 PM    Acceptance, E,D, NR by LS at 1/11/2019  2:52 PM    Acceptance, E,D, NR by LS at 1/10/2019  1:50 PM    Acceptance, E,D, VU,NR by LS at 1/8/2019  8:18 AM    Acceptance, E, NR by VG at 1/4/2019  3:04 PM    Comment:  Educated on HEP and correct mechanics for safe functional mobility. Reinforcement  needed d/t current cognitive status.    Acceptance, E, NR by VG at 1/3/2019  9:35 AM    Comment:  Educated on HEP and correct mechanics for safe functional mobility. Reinforcement needed d/t impaired safety awareness.    Acceptance, E,D, NR by ILEANA at 1/1/2019  2:22 PM   Family Acceptance, E,D, NR by SUMAN at 1/11/2019  2:52 PM    Acceptance, E, NR by VG at 1/4/2019  3:04 PM    Comment:  Educated on HEP and correct mechanics for safe functional mobility. Reinforcement needed d/t current cognitive status.    Acceptance, E, NR by VG at 1/3/2019  9:35 AM    Comment:  Educated on HEP and correct mechanics for safe functional mobility. Reinforcement needed d/t impaired safety awareness.   Significant Other Acceptance, E,D, VU,NR by SUMAN at 1/8/2019  8:18 AM    Acceptance, E,D, NR by ILEANA at 1/1/2019  2:22 PM                               User Key     Initials Effective Dates Name Provider Type Discipline     06/19/15 -  Reta Iqbal, PT Physical Therapist PT     06/19/15 -  Yakelin Alberts, PT Physical Therapist PT    PR 11/26/18 -  Freddie Ledezma, RN Registered Nurse Nurse     05/29/18 -  Maribeth Lynn, PT Physical Therapist PT                PT Recommendation and Plan  Anticipated Discharge Disposition (PT): inpatient rehabilitation facility(pending progress)  Planned Therapy Interventions (PT Eval): balance training, bed mobility training, gait training, home exercise program, patient/family education, strengthening, transfer training  Therapy Frequency (PT Clinical Impression): daily  Outcome Summary/Treatment Plan (PT)  Daily Summary of Progress (PT): progress toward functional goals is good  Anticipated Equipment Needs at Discharge (PT): (TBD)  Anticipated Discharge Disposition (PT): inpatient rehabilitation facility(pending progress)  Plan of Care Reviewed With: patient  Progress: improving  Outcome Summary: Pt more alert and cooperative. Pt able to practice sit <> stand t/f's from recliner facing bedrail x2  reps with CGA. pt performed seated therex. Pt limited by pain. Pt may be able to ambulate next session if pain has improved.  Outcome Measures     Row Name 01/13/19 1320 01/11/19 1452          How much help from another person do you currently need...    Turning from your back to your side while in flat bed without using bedrails?  2  -SJ  2  -LS     Moving from lying on back to sitting on the side of a flat bed without bedrails?  2  -SJ  2  -LS     Moving to and from a bed to a chair (including a wheelchair)?  2  -SJ  2  -LS     Standing up from a chair using your arms (e.g., wheelchair, bedside chair)?  3  -SJ  2  -LS     Climbing 3-5 steps with a railing?  1  -SJ  1  -LS     To walk in hospital room?  2  -SJ  1  -LS     AM-PAC 6 Clicks Score  12  -SJ  10  -LS        Functional Assessment    Outcome Measure Options  AM-PAC 6 Clicks Basic Mobility (PT)  -SJ  AM-PAC 6 Clicks Basic Mobility (PT)  -LS       User Key  (r) = Recorded By, (t) = Taken By, (c) = Cosigned By    Initials Name Provider Type     Reta Iqbal, PT Physical Therapist    LS Yakelin Alberts, PT Physical Therapist         Time Calculation:   PT Charges     Row Name 01/13/19 1358 01/13/19 1320          Time Calculation    Start Time  1320  -  --     PT Received On  01/13/19  [facility]  --     PT Goal Re-Cert Due Date  01/18/19  [facility]  --        Time Calculation- PT    Total Timed Code Minutes- PT  23 minute(s)  -  --        Timed Charges    34330 - PT Therapeutic Exercise Minutes  --  6  -     39549 - PT Therapeutic Activity Minutes  --  17  -SJ       User Key  (r) = Recorded By, (t) = Taken By, (c) = Cosigned By    Initials Name Provider Type     Reta Iqbal, PT Physical Therapist        Therapy Suggested Charges     Code   Minutes Charges    44282 (CPT®) Hc Pt Neuromusc Re Education Ea 15 Min      56008 (CPT®) Hc Pt Ther Proc Ea 15 Min 6 1    22806 (CPT®) Hc Gait Training Ea 15 Min      08604 (CPT®) Hc Pt Therapeutic Act Ea 15 Min 17 1     74642 (CPT®) Hc Pt Manual Therapy Ea 15 Min      30066 (CPT®) Hc Pt Iontophoresis Ea 15 Min      98961 (CPT®) Hc Pt Elec Stim Ea-Per 15 Min      56688 (CPT®) Hc Pt Ultrasound Ea 15 Min      71047 (CPT®) Hc Pt Self Care/Mgmt/Train Ea 15 Min      22734 (CPT®) Hc Pt Prosthetic (S) Train Initial Encounter, Each 15 Min      29018 (CPT®) Hc Pt Orthotic(S)/Prosthetic(S) Encounter, Each 15 Min      84472 (CPT®) Hc Orthotic(S) Mgmt/Train Initial Encounter, Each 15min      Total  23 2        Therapy Charges for Today     Code Description Service Date Service Provider Modifiers Qty    76202784434 HC PT THER PROC EA 15 MIN 1/13/2019 Reta Iqbal, PT GP 1    79880303761 HC PT THERAPEUTIC ACT EA 15 MIN 1/13/2019 Reta Iqbal, PT GP 1          PT G-Codes  Outcome Measure Options: AM-PAC 6 Clicks Basic Mobility (PT)  AM-PAC 6 Clicks Score: 12  Score: 6    Reta Iqbal PT  1/13/2019

## 2019-01-13 NOTE — PROGRESS NOTES
[facility] Progress Note        Antibiotics:  Anti-Infectives (From admission, onward)    Ordered     Dose/Rate Route Frequency Start Stop    01/07/19 0942  Linezolid (ZYVOX) 600 mg 300 mL     Ordering Provider:  Scot Higgins MD    600 mg  300 mL/hr over 60 Minutes Intravenous Every 12 Hours Scheduled 01/07/19 1100 01/17/19 0859    01/06/19 1306  meropenem (MERREM) 1 g/100 mL 0.9% NS VTB (mbp)     Scot Higgins MD reviewed the order on 01/11/19 0842.   Ordering Provider:  Scot Higgins MD    1 g  over 3 Hours Intravenous Every 8 Hours 01/06/19 1600 01/17/19 2359    01/05/19 1820  micafungin 100 mg/100 mL 0.9% NS IVPB (mbp)     Scot Higgins MD reviewed the order on 01/10/19 0719.   Ordering Provider:  Scot Higgins MD    100 mg  over 60 Minutes Intravenous Every 24 Hours 01/05/19 2000 01/17/19 0718    01/05/19 1859  meropenem (MERREM) 1 g/100 mL 0.9% NS VTB (mbp)     Ordering Provider:  Levi Bustos, RPH    1 g  over 30 Minutes Intravenous Once 01/05/19 1945 01/05/19 2039 01/05/19 1859  vancomycin 2250 mg/500 mL 0.9% NS IVPB ([facility])     Ordering Provider:  Levi Bustos RPH    2,250 mg Intravenous Once 01/05/19 1945 01/05/19 2009 01/01/19 0936  piperacillin-tazobactam (ZOSYN) 3.375 g in iso-osmotic dextrose 50 ml (premix)     Ordering Provider:  Miquel Brody MD    3.375 g  over 30 Minutes Intravenous Once 01/01/19 1030 01/01/19 1209    12/31/18 0632  ceFAZolin in dextrose (ANCEF) IVPB solution 2 g     Ordering Provider:  Miquel Brody MD    2 g  over 30 Minutes Intravenous Once 12/31/18 0634 12/31/18 0800    12/31/18 0632  metroNIDAZOLE (FLAGYL) IVPB 500 mg     Ordering Provider:  Miquel Brody MD    500 mg  over 60 Minutes Intravenous Once 12/31/18 0634 12/31/18 0830          CC: fatigue, abd pain    HPI:    Patient is a 68 y.o.  Yr old male with history of biliary tract obstruction, admitted to [facility] multiple times in the last  2 months including August 10 until August 16, underwent cholecystectomy with pathology suggesting chronic cholecystitis, had ERCP on August 14 with biliary stricture/proximal duct dilation and had stent placement.  He was readmitted August 21, 2018 until August 25, 2018 with acute sepsis, Klebsiella bacteremia, discharged with oral antibiotics.  Readmitted September 12, 2018 until September 17, 2018 with ESBL Escherichia coli bacteremia seen by infectious disease in [place] and treated with Invanz, duration of therapy unclear but approximately until September 24.  During that period of time, concern for malignancy mentioned in notes and referred to [facility] for further biopsy, done September 28.  Presented once again to [facility] emergency room September 30, 2018 with acute onset abdominal pain/nausea/vomiting.  Blood cultures positive again with  Klebsiella species and  Transferred to [facility]. 10/1/18 ERCP with evidence for obstruction/purulence and new stent placed; He was treated with rocephin/flagyl and last seen by us early October; after that, he had more definitive diagnosis of pancreatic cancer and treated with neoadjuvant chemotherapy and right chest port placement.  In addition he required TPN.  Family not aware of any other specific microbiologic diagnosis since October; he was admitted December 31 and underwent pancreaticoduodenectomy, wedge biopsy of liver tumor and portal vein resection/lateral repair with gastrojejunostomy tube placement.  Moved to ICU on January 5 with concern regarding increased confusion/Tachypnea, abnormal CT scan with pneumatosis/ileus and portal venous gas in addition to intra-abdominal fluid per description of radiology.  Chest x-ray January 6 with low lung volumes and increased markings at lung bases bilaterally but no focal parenchymal opacification per radiology.  Head CT no acute intracranial abnormality.    1/11 perc drain with  "1.8 liters fluid removed per radiology     1/13/19 seen early and sleepy;  Patient  sleepy/Confused and does not cooperate with a history or review of systems.  He answers yes/no to some simple questions but unclear reliability.  Nursing reports left arm PICC line placed January 6 and remains stable, chronic right port in the chest with no new redness/swelling or change there.  Indwelling Hassan catheter, G/J-tube, and ostomy bag over prior MADDY drain site; MADDY drain removed January 6.  No stool output since admission per nursing.  No rash.  On nasal cannula oxygen with no productive cough. LG fever at 100.9 last 24 hours     Patient does not cooperate with ROS otherwise      ROS:        1/13/19 as above, patient confused and not cooperative with review of systems          PE:   /89 (BP Location: Right arm, Patient Position: Lying)   Pulse 89   Temp 99.5 °F (37.5 °C) (Bladder)   Resp 16   Ht 182.9 cm (72\")   Wt 100 kg (220 lb 14.4 oz)   SpO2 95%   BMI 29.96 kg/m²       GENERAL: sleepy.  Nasal cannula oxygen  HEENT: Normocephalic, atraumatic.  PERRL. EOMI. No conjunctival injection. No icterus. Oropharynx clear without evidence of thrush or exudate. No evidence of peridontal disease.    NECK: Supple without nuchal rigidity. No mass.  LYMPH: No cervical, axillary or inguinal lymphadenopathy.  HEART: RRR; No murmur, rubs, gallops.   LUNGS: Diminished at bases bilaterally without wheezing, rales, rhonchi. Normal respiratory effort. Nonlabored. No dullness.  ABDOMEN: Soft, tender and distended. Positive bowel sounds managed. No rebound or guarding. NO mass or HSM.  Ostomy bag noted at right abdomen but no active drainage or fluid in bag.  Surgical site noted with no redness induration or warmth.  No mass bulge or fluctuance.  No crepitus or bulla.  Feeding tube site noted with no redness induration or warmth.  No mass bulge or fluctuance appreciated.  No crepitus or bulla.  No purulence or skin necrosis  EXT:  " No cyanosis, clubbing. No cord.  : Genitalia generally unremarkable.  With Hassan catheter.  MSK: FROM without joint effusions noted arms/legs.    SKIN: Warm and dry without cutaneous eruptions on Inspection/palpation.    NEURO: sleepy     Right chest port with no redness induration or warmth.  No crepitus or bulla.  No purulence     Left arm PICC line with no redness or purulence     No peripheral stigmata/phenomena of endocarditis        Laboratory Data    Results from last 7 days   Lab Units  01/13/19   0352  01/11/19   0416  01/10/19   0521   WBC 10*3/mm3  7.32  13.86*  16.22*   HEMOGLOBIN g/dL  7.6*  7.8*  8.6*   HEMATOCRIT %  23.8*  24.0*  26.4*   PLATELETS 10*3/mm3  389  342  311     Results from last 7 days   Lab Units  01/13/19   0352   SODIUM mmol/L  135   POTASSIUM mmol/L  4.0   CHLORIDE mmol/L  105   CO2 mmol/L  25.0   BUN mg/dL  24*   CREATININE mg/dL  0.88   GLUCOSE mg/dL  146*   CALCIUM mg/dL  8.2*     Results from last 7 days   Lab Units  01/13/19   0352   ALK PHOS U/L  177*   BILIRUBIN mg/dL  0.4   ALT (SGPT) U/L  41*   AST (SGOT) U/L  41*         Results from last 7 days   Lab Units  01/06/19   1406   CRP mg/dL  20.71*       Estimated Creatinine Clearance: 95.7 mL/min (by C-G formula based on SCr of 0.88 mg/dL).      Microbiology:      Radiology:  Imaging Results (last 72 hours)     Procedure Component Value Units Date/Time    XR Chest 1 View [[id number]] Collected:  01/06/19 0915     Updated:  01/07/19 1411    Narrative:          EXAMINATION: XR CHEST 1 VW - 1/6/2019     INDICATION: Z01.89-Encounter for other specified special examinations;  C25.9-Malignant neoplasm of pancreas, unspecified; Z74.09-Other reduced  mobility      COMPARISON: 01/05/2019     FINDINGS: Portable chest reveals low lung volumes. Deep line catheter on  the right with tip in the SVC. Mild increased markings at lung bases  bilaterally with degenerative changes seen within the spine.  No focal  parenchymal opacification  present.       Impression:       Low lung volumes with increased markings at the lung bases  bilaterally.     DICTATED:   1/6/2019  EDITED/ls :   1/6/2019      This report was finalized on 1/7/2019 2:09 PM by Dr. Tasha Guzmán MD.       CT Head Without Contrast [[id number]] Collected:  01/06/19 1803     Updated:  01/07/19 1410    Narrative:       EXAMINATION: CT HEAD WO CONTRAST - 1/6/2019     INDICATION:  Z01.89-Encounter for other specified special examinations;  C25.9-Malignant neoplasm of pancreas, unspecified; Z74.09-Other reduced  mobility. Mental status change.     TECHNIQUE: Multiple axial CT imaging is obtained of the head without the  administration of intravenous contrast.     The radiation dose reduction device was turned on for each scan per the  ALARA (As Low as Reasonably Achievable) protocol.     COMPARISON: 11/17/2018     FINDINGS: Atrophy of the brain with low-density area seen in the  periventricular and subcortical white matter suggesting chronic small  vessel ischemic change. No hemorrhage or hydrocephalus. No mass, mass  effect, or midline shift. No abnormal extra-axial fluid collections  identified. The bony structures reveal no evidence of osseous  abnormality. The visualized paranasal sinuses are clear. The mastoid air  cells are patent.       Impression:       Chronic changes seen within the brain with no acute  intracranial abnormality identified.     DICTATED:   1/6/2019  EDITED/ls :   1/6/2019         This report was finalized on 1/7/2019 2:08 PM by Dr. Tasha Guzmán MD.       XR Chest 1 View [[id number]] Collected:  01/05/19 1928     Updated:  01/06/19 1008    Narrative:          EXAMINATION: XR CHEST 1 VW - 1/5/2019     INDICATION:  Z01.89-Encounter for other specified special examinations;  C25.9-Malignant neoplasm of pancreas, unspecified; Z74.09-Other reduced  mobility      COMPARISON: 01/05/2019     FINDINGS: Portable chest reveals deep line catheter on the right  with  tip in the SVC. Mild increased markings at left lung base with small  left pleural effusion. Improvement seen in aeration of the lung bases in  the interval. Degenerative change is seen within the spine. Pulmonary  vascularity is within normal limits.           Impression:       Improvement seen in aeration of the lung bases in the  interval with only minimal right residual markings at the lung bases.     DICTATED:   1/5/2019  EDITED/ls :   1/5/2019      This report was finalized on 1/6/2019 10:06 AM by Dr. Tasha Guzmán MD.       CT Abdomen Pelvis Without Contrast [[id number]] Collected:  01/05/19 1645     Updated:  01/05/19 1655    Narrative:       EXAMINATION: CT ABDOMEN/PELVIS WO CONTRAST - 1/5/2019      INDICATION:  Z01.89-Encounter for other specified special examinations;  C25.9-Malignant neoplasm of pancreas, unspecified; Z74.09-Other reduced  mobility. Abdominal pain.     TECHNIQUE: Multiple axial CT imaging is obtained of the abdomen and  pelvis without the administration of oral or intravenous contrast.     The radiation dose reduction device was turned on for each scan per the  ALARA (As Low as Reasonably Achievable) protocol.     COMPARISON: NONE     FINDINGS: There are small bilateral pleural effusions. Atelectatic  change is seen in the lung bases bilaterally. Extraluminal air is  identified throughout the upper abdomen. There is fluid seen surrounding  the spleen as well as the liver. There is a small to  moderate size  hiatal hernia. There is gastric distention. There are postoperative  changes seen from Whipple procedure. There is a small collection of  fluid identified along the leftward aspect of the liver. There is some  free fluid identified near the sarbjit hepatis. There is a G-tube  identified in satisfactory position. Kidneys and adrenal glands within  normal limits. There is gaseous distention of the small bowel loops  suggesting a postoperative ileus. There is air identified  within the  mesenteric vessels. Findings are concerning for pneumatosis of several  small bowel loops centrally within the abdomen. There is fluid seen  scattered throughout the colon. Mild distention of the colon. There is  some fluid seen within the pelvis.     Pelvis: The pelvic portion of the gastrointestinal tract is within  normal limits. Fluid seen within the colon. The bladder reveals  air-fluid level likely from prior instrumentation. No pelvic adenopathy.  Bony structures reveal no evidence of osseous abnormality.       Impression:       There are small bilateral pleural effusions. There is a  fluid collection seen surrounding the liver and spleen containing air in  the fluid collection around the liver. There is air seen scattered  throughout the abdomen which may be postoperative. There is air  identified within the mesenteric vessels with findings suggesting  pneumatosis throughout scattered small bowel loops within the mid  abdomen. Fluid throughout the colon suggesting clinical presentation of  diarrhea. Gastrostomy tube is in satisfactory position.     DICTATED:   1/5/2019  EDITED/ls :   1/5/2019      This report was finalized on 1/5/2019 4:53 PM by Dr. Tasha Guzmán MD.               Impression:     --Acute postoperative leukocytosis and low-grade fever, approx , at risk for infection multiple sites including abdomen, lung, urine, IV lines, etc.  IV sites look benign at present and Blood cultures negative so far.  Urinalysis with negative leuk esterase/nitrites and UTI generally less likely at present.  Low lung volumes on chest x-ray not unexpected after abdominal surgery, currently no cough and while he is at risk for pneumonia, unable to confirm at present.  Abdomen remains primary concern with enterocolitis, postoperative fluid and on empiric antibiotics with these concerns in mind with Zyvox/Merrem and micafungin.  At risk for MDR organisms with prior history ESBL and multiple  hospitalizations, etc. WBC count trended down some 1/8-1/11. Abd CT 1/10 with fluid collection and  aspiration/drain 1/11 with enterococcus/GNR so far     --Acute postoperative enterocolitis.  Recent surgery as outlined above in the setting of pancreatic cancer, prior neoadjuvant chemotherapy and tube feeding.  Tube feedings stopped and TPN restarted.  Dr. Brody following closely to determine timing/option/threshold for further surgical intervention or serial imaging/percutaneous drainage etc.  I d/w Dr Brody    --acute postop fluid collection by CT 1/10;  Perc drain/aspirate planned 1/11 per nursing     --Pancreatic cancer with prior neoadjuvant chemotherapy, indwelling chronic port and recent surgery as outlined above.     --COPD     --Diabetes type 2     --Chronic anticoagulation with history A. fib and past antiarrhythmic therapy     --Hx ESBL    PLAN:      --IV Zyvox/Merrem and Mycamine     --Check/review labs cultures and scans     --History per nursing as well     --Discussed with microbiology     --Highly complex set of issues with high risk for further serious morbidity and other serious sequela           Scot Higgins MD  1/13/2019

## 2019-01-13 NOTE — PROGRESS NOTES
Pulmonary/Critical Care Follow-up     LOS: 13 days   Patient Care Team:  Adiel Denney MD as PCP - General  Loida Campbell APRN as Nurse Practitioner (Nurse Practitioner)    Chief Complaint / reason : Pancreatic cancer / medical management of post-operative conditions including diabetes, atrial fibrillation.        Subjective    68-year-old male with a history of biliary tract obstruction who underwent cholecystectomy last August with subsequent ERCP for biliary stricture with stent placement.  He subsequently had an FNA on September 28 which was suspicious for adenocarcinoma in the head of the pancreas.  He had a repeat ERCP on 10/1/2018 and a new stent was placed.  He has been treated multiple times for recurrent bacteremias associated with his biliary obstruction.  He was subsequently treated with neoadjuvant chemotherapy.    The patient underwent a Whipple procedure with wedge biopsy of liver tumor and portal vein resection/lateral repair with gastrojejunostomy tube placement by Dr. Brody on December 31, 2018.  He is transferred to the intensive care unit on January 5 with increasing confusion and tachypnea and a CT scan showing portal venous gas, pneumatosis/ileus and intra-abdominal fluid.  Findings of portal venous gas and pneumatosis resolved on subsequent imaging however patient had evidence of an abdominal abscess.    Patient had his abdominal abscess drain placed 1/11/2019 with 1.8 L out initially of purulent material.    Interval History:   Abdominal drain remains in place.  Patient does report some ongoing abdominal pain.  He continues to be more alert.  He is afebrile.  Having bowel movements.  No new complaints.    History taken from: Patient, staff.    PMH/FH/Social History were reviewed and updated appropriately in the electronic medical record.     Review of Systems:    Review of 14 systems was completed with positives and pertinent negatives noted in the subjective section.   All other systems reviewed and are negative.       Objective     Vital Signs  Temp:  [98.4 °F (36.9 °C)-99.9 °F (37.7 °C)] 99.3 °F (37.4 °C)  Heart Rate:  [80-97] 88  Resp:  [16-26] 18  BP: (127-159)/(77-99) 127/77  01/12 0701 - 01/13 0700  In: 4210.9 [P.O.:220; I.V.:1027.8]  Out: 6030 [Urine:5150; Drains:530]  Body mass index is 29.96 kg/m².     Physical Exam:     Constitutional:   Awake, cooperative, in no acute distress   Head:   Normocephalic, without obvious abnormality, atraumatic   Eyes:           Lids and lashes normal, conjunctivae and sclerae normal.  No icterus, no pallor, corneas clear, PER   ENMT:  Ears appear intact with no abnormalities noted     No oral lesions, no thrush, oral mucosa moist   Neck:  No adenopathy, supple, trachea midline, no thyromegaly, no JVD   Lungs/Resp:    Normal effort, symmetric chest rise, no crepitus, clear to      auscultation bilaterally, no chest wall tenderness                Heart/CV:   Regular rhythm and normal rate, normal S1 and S2, no            murmur   Abdomen/GI:    Positive bowel sounds, midline wound with staples intact, no masses, no organomegaly, soft, mildly tender, RUQ drain in place, non-distended   :    Deferred   Extremities/MSK:  No clubbing or cyanosis.  No edema.  Normal tone.    No deformities.    Pulses:  Pulses palpable and equal bilaterally   Skin:  No bleeding, bruising or rash   Heme/Lymph:  No cervical or supraclavicular adenopathy.   Neurologic:      Psychiatric:    Moves all extremities with no obvious focal motor deficit.  Cranial nerves 2 - 12 grossly intact, mild tremor.     Awake, normal affect.      Results Review:     I reviewed the patient's new clinical results.   Results from last 7 days   Lab Units  01/13/19   0352  01/12/19   0421  01/11/19   0416  01/10/19   0521  01/09/19   0417   SODIUM mmol/L  135  130*  132  129*  131*   POTASSIUM mmol/L  4.0  4.0  4.2  3.7  3.7   CHLORIDE mmol/L  105  100  101  101  102   CO2 mmol/L  25.0   26.0  24.0  23.0  22.0   BUN mg/dL  24*  27*  28*  27*  25*   CREATININE mg/dL  0.88  0.88  0.80  0.81  0.92   CALCIUM mg/dL  8.2*  7.7*  7.5*  7.9*  8.0*   BILIRUBIN mg/dL  0.4   --    --   0.8  0.8   ALK PHOS U/L  177*   --    --   223*  188*   ALT (SGPT) U/L  41*   --    --   45*  35   AST (SGOT) U/L  41*   --    --   58*  46*   GLUCOSE mg/dL  146*  183*  130*  208*  246*     Results from last 7 days   Lab Units  01/13/19   0352  01/11/19   0416  01/10/19   0521   WBC 10*3/mm3  7.32  13.86*  16.22*   HEMOGLOBIN g/dL  7.6*  7.8*  8.6*   HEMATOCRIT %  23.8*  24.0*  26.4*   PLATELETS 10*3/mm3  389  342  311     Results from last 7 days   Lab Units  01/10/19   1634   PH, ARTERIAL pH units  7.519*   PO2 ART mm Hg  76.8*   PCO2, ARTERIAL mm Hg  28.5   HCO3 ART mmol/L  23.2     Results from last 7 days   Lab Units  01/13/19   0352  01/12/19   0421  01/11/19   0416   MAGNESIUM mg/dL  1.8  2.2  1.8   PHOSPHORUS mg/dL  3.9  4.0  3.6   Abdominal fluid cultures from 1/11/2019 are growing enterococcus and gram-negative bacilli.        I reviewed the patient's new imaging including images and reports.  CXR 1/6/18:  IMPRESSION:  Low lung volumes with increased markings at the lung bases  Bilaterally.    CT Abd/pelvis 1/10/19:  IMPRESSION:  1. Very large, increasing subcapsular collection of the liver,  presumably enlarging biloma. Infected biloma cannot be excluded. Mildly  increased ascites compared to 1/5/2019 exam.   2. Previously noted small bowel pneumatosis has resolved. Small bowel  now appears practically normal.  3. Mild to moderate bilateral lower lobe atelectasis and very small  effusions.    Medication Review:     budesonide 0.5 mg Nebulization BID - RT   enoxaparin 1 mg/kg Subcutaneous Q12H   Fat Emulsion Fish Oil Based 200 mL Intravenous Q24H   flecainide 50 mg Oral Q12H   insulin regular 0-9 Units Subcutaneous Q6H   insulin regular 10 Units Subcutaneous Q6H   ipratropium-albuterol 3 mL Nebulization BID - RT    Linezolid 600 mg Intravenous Q12H   meropenem 1 g Intravenous Q8H   metoprolol tartrate 25 mg Oral Q12H   micafungin (MYCAMINE)  mg Intravenous Q24H   pantoprazole 40 mg Intravenous Q AM   Scopolamine 1 patch Transdermal Q72H   sodium chloride 10 mL Intravenous Q12H       Adult Central 2-in-1 TPN  Last Rate: 75 mL/hr at 01/12/19 1802   Adult Central 2-in-1 TPN     Pharmacy to Dose TPN         Assessment/Plan       Malignant neoplasm of head of pancreas (CMS/HCC)    Hyperlipidemia    COPD (chronic obstructive pulmonary disease) (CMS/HCC)    Essential hypertension    Gastroesophageal reflux disease without esophagitis    DM2 (diabetes mellitus, type 2) (CMS/HCC)    Paroxysmal atrial fibrillation    Chronic anticoagulation, Eliquis    Post-operative confusion and somnolence, likely due to oversedation    Atrial fibrillation with RVR (CMS/HCC)    Adult necrotizing enterocolitis (CMS/HCC)    Encephalopathy    69 YO with h/o DMII, CKD3, GERD, PAF on Eliquis, locally advanced pancreatic cancer with zulay-adjuvant chemotherapy admitted post Pancreaticoduodenectomy, wedge biopsy of liver, portal vein resection and lateral repair and GJ tube by Dr. Brody on 12/31/18.      Moved to ICU on 1/5/18 due to concerns of breathing difficulty, encephalopathy and findings of pneumatosis, portal venous gas and ileus on CT abdomen/pelvis and concern for sepsis.  Portal venous gas resolved on subsequent imaging.    Patient appears clinically improved after abdominal drain placement.    Findings of possible intra-abdominal fluid on follow up CT abd/pelvis.  CT guided drain placed 1/11/19.  Doing much better.    Plan:  1. Lovenox for anticoagulation for atrial fibrilation.  Eventually transition back to Eliquis when okay with surgery.   2. Antibiotics per ID.   3. Continue scheduled regular insulin.  Continue correction insulin.   4. Continue TPN.   5. Continue flecainide.   6.  Continue scheduled metoprolol.   7. Judicious use of  narcotics for pain management.    8. PT/OT.  9. Monitor and replace electrolytes.   10.  Management of abscess drain per surgery.       Gerardo Messina MD  01/13/19  12:37 PM    *. Please note that portions of this note were completed with a voice recognition program. Efforts were made to edit the dictations, but occasionally words are mistranscribed.

## 2019-01-13 NOTE — PROGRESS NOTES
Pharmacy Parenteral Nutrition Evaluation  Todd Phillips is a  68 y.o. male receiving TPN.     Indication: Necrotizing enterocolitis  Consulting Physician:  Dr Brody    Labs  Results from last 7 days   Lab Units  01/13/19   0352  01/12/19   0421  01/11/19   0416  01/10/19   0521  01/09/19   0417   SODIUM mmol/L  135  130*  132  129*  131*   POTASSIUM mmol/L  4.0  4.0  4.2  3.7  3.7   CHLORIDE mmol/L  105  100  101  101  102   CO2 mmol/L  25.0  26.0  24.0  23.0  22.0   BUN mg/dL  24*  27*  28*  27*  25*   CREATININE mg/dL  0.88  0.88  0.80  0.81  0.92   CALCIUM mg/dL  8.2*  7.7*  7.5*  7.9*  8.0*   BILIRUBIN mg/dL  0.4   --    --   0.8  0.8   ALK PHOS U/L  177*   --    --   223*  188*   ALT (SGPT) U/L  41*   --    --   45*  35   AST (SGOT) U/L  41*   --    --   58*  46*   GLUCOSE mg/dL  146*  183*  130*  208*  246*     Results from last 7 days   Lab Units  01/13/19 0352 01/12/19 0421 01/11/19 0416 01/06/19   1406   MAGNESIUM mg/dL  1.8  2.2  1.8   < >   --    PHOSPHORUS mg/dL  3.9  4.0  3.6   < >   --    PREALBUMIN mg/dL   --    --    --    --   <5.0*    < > = values in this interval not displayed.     Results from last 7 days   Lab Units  01/13/19   0352  01/11/19   0416  01/10/19   0521   WBC 10*3/mm3  7.32  13.86*  16.22*   HEMOGLOBIN g/dL  7.6*  7.8*  8.6*   HEMATOCRIT %  23.8*  24.0*  26.4*   PLATELETS 10*3/mm3  389  342  311     Triglycerides   Date Value Ref Range Status   01/07/2019 133 0 - 150 mg/dL Final     estimated creatinine clearance is 95.7 mL/min (by C-G formula based on SCr of 0.88 mg/dL).    Intake & Output (last 3 days)         01/10 0701 - 01/11 0700 01/11 0701 - 01/12 0700 01/12 0701 - 01/13 0700 01/13 0701 - 01/14 0700    P.O. 120  220 480    I.V. (mL/kg) 26.6 (0.3) 441.2 (4.4) 1027.8 (10.3) 374 (3.7)    Other 440 30 30     NG/GT 60 120 90     IV Piggyback 974.7 819 600     TPN 2060.2 1990.3 2243.1 240    Total Intake(mL/kg) 3681.5 (36.1) 3400.5 (34) 4210.9 (42.1) 1094 (10.9)     Urine (mL/kg/hr) 1455 (0.6) 3150 (1.3) 5150 (2.1) 250 (0.7)    Emesis/NG output 20  350     Drains  2120 530 50    Stool 0  0     Total Output 1475 5270 6030 300    Net +2206.5 -1869.5 -1819.1 +794            Stool Unmeasured Occurrence 2 x  3 x             Dietitian Recommendations  Current TPN Regimen Recommendation:  Dextrose 20% / Amino Acid 7% at goal rate of 60mL/hr.  20% SMOF Lipid Emulsion 200mL every 24 hours.    Assessment/Plan:  1. Continuing TPN for necrotizing enterocolitis. Macronutrients per dietary recommendation as listed above. Goal rate of  60 mL/hr as of 1/13 per RD.  2. Will continue with standard electrolytes and 1:1 acid base. Electrolyte replacements and sliding scale insulin are available. Per note- tolerating full liquids. Replaced magnesium 1/13 per protocol.  3. Pharmacy will continue to follow and adjust as indicated.     Jana Aguilar, PharmD, BCPS  1/13/2019  10:47 AM

## 2019-01-13 NOTE — PLAN OF CARE
Problem: Patient Care Overview  Goal: Plan of Care Review  Outcome: Ongoing (interventions implemented as appropriate)   01/13/19 7580   OTHER   Outcome Summary Pt more alert and cooperative. Pt able to practice sit <> stand t/f's from recliner facing bedrail x2 reps with CGA. pt performed seated therex. Pt limited by pain. Pt may be able to ambulate next session if pain has improved.   Coping/Psychosocial   Plan of Care Reviewed With patient   Plan of Care Review   Progress improving

## 2019-01-13 NOTE — PROGRESS NOTES
"Todd Phillips  1950  [id number]    Surgery Progress Note    Date of visit: 1/13/2019    Subjective: Complains of occasional abdominal pain  Has been off narcotics  Taking PO minimally with no complaints of nausea  few bowel movements    Objective:    /89 (BP Location: Right arm, Patient Position: Lying)   Pulse 89   Temp 98.8 °F (37.1 °C) (Bladder)   Resp 16   Ht 182.9 cm (72\")   Wt 100 kg (220 lb 14.4 oz)   SpO2 95%   BMI 29.96 kg/m²     Intake/Output Summary (Last 24 hours) at 1/13/2019 0852  Last data filed at 1/13/2019 0604  Gross per 24 hour   Intake 4180.86 ml   Output 6030 ml   Net -1849.14 ml         L: normal air entry    Abd: Nondistended and soft  Midline incision is intact and healing well with no drainage or erythema  Right upper quadrant drain with mildly thick serous fluid      LABS:    Results from last 7 days   Lab Units  01/13/19   0352   WBC 10*3/mm3  7.32   HEMOGLOBIN g/dL  7.6*   HEMATOCRIT %  23.8*   PLATELETS 10*3/mm3  389     Results from last 7 days   Lab Units  01/13/19   0352   SODIUM mmol/L  135   POTASSIUM mmol/L  4.0   CHLORIDE mmol/L  105   CO2 mmol/L  25.0   BUN mg/dL  24*   CREATININE mg/dL  0.88   CALCIUM mg/dL  8.2*   BILIRUBIN mg/dL  0.4   ALK PHOS U/L  177*   ALT (SGPT) U/L  41*   AST (SGOT) U/L  41*   GLUCOSE mg/dL  146*     Results from last 7 days   Lab Units  01/13/19   0352   SODIUM mmol/L  135   POTASSIUM mmol/L  4.0   CHLORIDE mmol/L  105   CO2 mmol/L  25.0   BUN mg/dL  24*   CREATININE mg/dL  0.88   GLUCOSE mg/dL  146*   CALCIUM mg/dL  8.2*     Lab Results   Lab Value Date/Time    LIPASE 19 12/06/2018 2305    LIPASE 23 11/17/2018 0026    LIPASE 22 10/30/2018 1454    LIPASE 29 10/17/2018 1556    LIPASE 24 09/30/2018 1709    LIPASE 25 09/12/2018 1450    LIPASE 26 09/11/2018 0302    LIPASE 33 08/21/2018 2306    LIPASE 34 08/13/2018 0649         Assessment/ Plan: Overall stable course status post pancreatic duodenectomy with portal vein resection secondary to " advanced pancreatic cancer  Adult NEC, appears to be stable  Continue with oral feeds  IV antibiotics per ID  Norco as needed    Problem List Items Addressed This Visit     None      Visit Diagnoses     Impaired functional mobility, balance, gait, and endurance    -  Primary    Laboratory test        Relevant Orders    ABORH 2ND SPECIMEN VERIFICATION (Completed)    Pancreatic cancer (CMS/HCC)        Relevant Orders    Tissue Pathology Exam (Completed)    OR Surgery Panel (Completed)    Blood Gas, Arterial    Impaired mobility and ADLs                Carroll Hernandez MD  1/13/2019  8:52 AM

## 2019-01-14 LAB
ALBUMIN SERPL-MCNC: 2.51 G/DL (ref 3.2–4.8)
ALP SERPL-CCNC: 161 U/L (ref 25–100)
ALT SERPL W P-5'-P-CCNC: 30 U/L (ref 7–40)
ANION GAP SERPL CALCULATED.3IONS-SCNC: 5 MMOL/L (ref 3–11)
AST SERPL-CCNC: 26 U/L (ref 0–33)
BILIRUB SERPL-MCNC: 0.3 MG/DL (ref 0.3–1.2)
BUN BLD-MCNC: 24 MG/DL (ref 9–23)
CALCIUM SPEC-SCNC: 8 MG/DL (ref 8.7–10.4)
CHLORIDE SERPL-SCNC: 101 MMOL/L (ref 99–109)
CHOLEST SERPL-MCNC: 51 MG/DL (ref 0–200)
CO2 SERPL-SCNC: 25 MMOL/L (ref 20–31)
CREAT BLD-MCNC: 0.84 MG/DL (ref 0.6–1.3)
GLUCOSE BLD-MCNC: 113 MG/DL (ref 70–100)
GLUCOSE BLDC GLUCOMTR-MCNC: 129 MG/DL (ref 70–130)
GLUCOSE BLDC GLUCOMTR-MCNC: 162 MG/DL (ref 70–130)
GLUCOSE BLDC GLUCOMTR-MCNC: 225 MG/DL (ref 70–130)
GLUCOSE BLDC GLUCOMTR-MCNC: 79 MG/DL (ref 70–130)
LAB AP CASE REPORT: NORMAL
MAGNESIUM SERPL-MCNC: 2 MG/DL (ref 1.3–2.7)
MAGNESIUM SERPL-MCNC: 2 MG/DL (ref 1.3–2.7)
PATH REPORT.FINAL DX SPEC: NORMAL
PHOSPHATE SERPL-MCNC: 3.9 MG/DL (ref 2.4–5.1)
PHOSPHATE SERPL-MCNC: 3.9 MG/DL (ref 2.4–5.1)
POTASSIUM BLD-SCNC: 4.1 MMOL/L (ref 3.5–5.5)
PROT SERPL-MCNC: 5.6 G/DL (ref 5.7–8.2)
SODIUM BLD-SCNC: 131 MMOL/L (ref 132–146)
TRIGL SERPL-MCNC: 71 MG/DL (ref 0–150)

## 2019-01-14 PROCEDURE — 83735 ASSAY OF MAGNESIUM: CPT

## 2019-01-14 PROCEDURE — 94640 AIRWAY INHALATION TREATMENT: CPT

## 2019-01-14 PROCEDURE — 84100 ASSAY OF PHOSPHORUS: CPT

## 2019-01-14 PROCEDURE — 82465 ASSAY BLD/SERUM CHOLESTEROL: CPT

## 2019-01-14 PROCEDURE — 25010000002 ONDANSETRON PER 1 MG: Performed by: SURGERY

## 2019-01-14 PROCEDURE — 84478 ASSAY OF TRIGLYCERIDES: CPT

## 2019-01-14 PROCEDURE — 25010000002 POTASSIUM CHLORIDE PER 2 MEQ OF POTASSIUM

## 2019-01-14 PROCEDURE — 97530 THERAPEUTIC ACTIVITIES: CPT

## 2019-01-14 PROCEDURE — 25010000002 CALCIUM GLUCONATE PER 10 ML

## 2019-01-14 PROCEDURE — 25010000002 ENOXAPARIN PER 10 MG: Performed by: INTERNAL MEDICINE

## 2019-01-14 PROCEDURE — 25010000002 LINEZOLID 600 MG/300ML SOLUTION: Performed by: INTERNAL MEDICINE

## 2019-01-14 PROCEDURE — 25010000002 MEROPENEM: Performed by: INTERNAL MEDICINE

## 2019-01-14 PROCEDURE — 97110 THERAPEUTIC EXERCISES: CPT

## 2019-01-14 PROCEDURE — 80053 COMPREHEN METABOLIC PANEL: CPT

## 2019-01-14 PROCEDURE — 82962 GLUCOSE BLOOD TEST: CPT

## 2019-01-14 PROCEDURE — 94799 UNLISTED PULMONARY SVC/PX: CPT

## 2019-01-14 PROCEDURE — 36415 COLL VENOUS BLD VENIPUNCTURE: CPT

## 2019-01-14 PROCEDURE — 99233 SBSQ HOSP IP/OBS HIGH 50: CPT | Performed by: INTERNAL MEDICINE

## 2019-01-14 PROCEDURE — 25010000002 HYDROMORPHONE PER 4 MG: Performed by: INTERNAL MEDICINE

## 2019-01-14 PROCEDURE — 25010000002 MAGNESIUM SULFATE PER 500 MG OF MAGNESIUM

## 2019-01-14 RX ADMIN — HYDROCODONE BITARTRATE AND ACETAMINOPHEN 2 TABLET: 5; 325 TABLET ORAL at 11:25

## 2019-01-14 RX ADMIN — BUDESONIDE 0.5 MG: 0.5 INHALANT RESPIRATORY (INHALATION) at 20:34

## 2019-01-14 RX ADMIN — INSULIN HUMAN 4 UNITS: 100 INJECTION, SOLUTION PARENTERAL at 12:47

## 2019-01-14 RX ADMIN — INSULIN HUMAN 2 UNITS: 100 INJECTION, SOLUTION PARENTERAL at 23:54

## 2019-01-14 RX ADMIN — LINEZOLID 600 MG: 600 INJECTION, SOLUTION INTRAVENOUS at 08:41

## 2019-01-14 RX ADMIN — HYDROCODONE BITARTRATE AND ACETAMINOPHEN 2 TABLET: 5; 325 TABLET ORAL at 23:53

## 2019-01-14 RX ADMIN — SODIUM CHLORIDE, PRESERVATIVE FREE 10 ML: 5 INJECTION INTRAVENOUS at 20:34

## 2019-01-14 RX ADMIN — METOPROLOL TARTRATE 25 MG: 25 TABLET ORAL at 08:43

## 2019-01-14 RX ADMIN — PANTOPRAZOLE SODIUM 40 MG: 40 INJECTION, POWDER, FOR SOLUTION INTRAVENOUS at 05:48

## 2019-01-14 RX ADMIN — CALCIUM GLUCONATE: 94 INJECTION, SOLUTION INTRAVENOUS at 17:27

## 2019-01-14 RX ADMIN — MEROPENEM 1 G: 1 INJECTION, POWDER, FOR SOLUTION INTRAVENOUS at 16:34

## 2019-01-14 RX ADMIN — MEROPENEM 1 G: 1 INJECTION, POWDER, FOR SOLUTION INTRAVENOUS at 23:52

## 2019-01-14 RX ADMIN — HYDROMORPHONE HYDROCHLORIDE 0.25 MG: 1 INJECTION, SOLUTION INTRAMUSCULAR; INTRAVENOUS; SUBCUTANEOUS at 14:33

## 2019-01-14 RX ADMIN — FLECAINIDE ACETATE 50 MG: 50 TABLET ORAL at 20:40

## 2019-01-14 RX ADMIN — ENOXAPARIN SODIUM 90 MG: 100 INJECTION SUBCUTANEOUS at 20:23

## 2019-01-14 RX ADMIN — HYDROMORPHONE HYDROCHLORIDE 0.25 MG: 1 INJECTION, SOLUTION INTRAMUSCULAR; INTRAVENOUS; SUBCUTANEOUS at 05:37

## 2019-01-14 RX ADMIN — METOPROLOL TARTRATE 25 MG: 25 TABLET ORAL at 20:33

## 2019-01-14 RX ADMIN — MICAFUNGIN SODIUM 100 MG: 20 INJECTION, POWDER, LYOPHILIZED, FOR SOLUTION INTRAVENOUS at 20:25

## 2019-01-14 RX ADMIN — LINEZOLID 600 MG: 600 INJECTION, SOLUTION INTRAVENOUS at 20:25

## 2019-01-14 RX ADMIN — SMOFLIPID 200 ML: 6; 6; 5; 3 INJECTION, EMULSION INTRAVENOUS at 17:26

## 2019-01-14 RX ADMIN — HYDROCODONE BITARTRATE AND ACETAMINOPHEN 2 TABLET: 5; 325 TABLET ORAL at 17:25

## 2019-01-14 RX ADMIN — IPRATROPIUM BROMIDE AND ALBUTEROL SULFATE 3 ML: 2.5; .5 SOLUTION RESPIRATORY (INHALATION) at 08:33

## 2019-01-14 RX ADMIN — HYDROCODONE BITARTRATE AND ACETAMINOPHEN 2 TABLET: 5; 325 TABLET ORAL at 03:59

## 2019-01-14 RX ADMIN — ENOXAPARIN SODIUM 90 MG: 100 INJECTION SUBCUTANEOUS at 08:41

## 2019-01-14 RX ADMIN — BUDESONIDE 0.5 MG: 0.5 INHALANT RESPIRATORY (INHALATION) at 08:33

## 2019-01-14 RX ADMIN — FLECAINIDE ACETATE 50 MG: 50 TABLET ORAL at 08:42

## 2019-01-14 RX ADMIN — IPRATROPIUM BROMIDE AND ALBUTEROL SULFATE 3 ML: 2.5; .5 SOLUTION RESPIRATORY (INHALATION) at 20:34

## 2019-01-14 RX ADMIN — MEROPENEM 1 G: 1 INJECTION, POWDER, FOR SOLUTION INTRAVENOUS at 08:41

## 2019-01-14 RX ADMIN — ONDANSETRON 4 MG: 2 INJECTION INTRAMUSCULAR; INTRAVENOUS at 11:25

## 2019-01-14 RX ADMIN — HYDROMORPHONE HYDROCHLORIDE 0.25 MG: 1 INJECTION, SOLUTION INTRAMUSCULAR; INTRAVENOUS; SUBCUTANEOUS at 20:20

## 2019-01-14 NOTE — PLAN OF CARE
Problem: Patient Care Overview  Goal: Plan of Care Review  Outcome: Ongoing (interventions implemented as appropriate)   01/14/19 1709   OTHER   Outcome Summary POD 14. Pt ambulating today in room. Pt with continued c/o of abdominal pain that resolves with PRN pain medication. Pt c/o Nausea once today after eating more than a liquid diet and with relief with PRN Odansetron. Pt continues to have a distended/rounded abdomen with tenderness to RUQ. Midline abdominal incision w/ no overt s/s of infection throughout the shift, MADDY to RUQ with around 200 thick milky drainage. Pt continues to receive TPN at 60ml/hr as well as lipids. Pt with a minor increase in PO intake today. Pt placed in Contact precautions d/t ESBL E. Coli and VRE (+), though there are no other changes to ID treatment plan per Ronaldo BELTRAN. Pt still with Hassan with approximately 1675 UOP. Pt scheduled to be transferred tomorrow via ambulance to [facility] at 1200. VSS, will continue to monitor.    Plan of Care Review   Progress improving     Goal: Individualization and Mutuality  Outcome: Ongoing (interventions implemented as appropriate)    Goal: Discharge Needs Assessment  Outcome: Ongoing (interventions implemented as appropriate)    Goal: Interprofessional Rounds/Family Conf  Outcome: Ongoing (interventions implemented as appropriate)

## 2019-01-14 NOTE — PROGRESS NOTES
Intensive Care Follow-up      LOS: 14 days     Mr. Todd Phillips, 68 y.o. male is followed for: Glycemic, Electrolyte, Respiratory, and Medical management     Subjective - Interval History     Awake and alert  Complaining of abdominal discomfort  Oxygenating well on room air  Remains on TPN; ate about 10% of prescribed diet    The patient's relevant past medical, surgical and social history were reviewed and updated in Epic as appropriate.     Objective     Infusions:    Adult Central 2-in-1 TPN  Last Rate: 60 mL/hr at 01/13/19 1838   Pharmacy to Dose TPN       Medications:    budesonide 0.5 mg Nebulization BID - RT   enoxaparin 1 mg/kg Subcutaneous Q12H   Fat Emulsion Fish Oil Based 200 mL Intravenous Q24H   flecainide 50 mg Oral Q12H   insulin regular 0-9 Units Subcutaneous Q6H   insulin regular 10 Units Subcutaneous Q6H   ipratropium-albuterol 3 mL Nebulization BID - RT   Linezolid 600 mg Intravenous Q12H   meropenem 1 g Intravenous Q8H   metoprolol tartrate 25 mg Oral Q12H   micafungin (MYCAMINE)  mg Intravenous Q24H   pantoprazole 40 mg Intravenous Q AM   Scopolamine 1 patch Transdermal Q72H   sodium chloride 10 mL Intravenous Q12H     Intake/Output       01/13/19 0700 - 01/14/19 0659 01/14/19 0700 - 01/15/19 0659    Intake (ml) 2889 --    Output (ml) 2600 450    Net (ml) 289 -450        Vital Sign Min/Max for last 24 hours  Temp  Min: 97.5 °F (36.4 °C)  Max: 99.5 °F (37.5 °C)   BP  Min: 98/67  Max: 150/88   Pulse  Min: 76  Max: 92   Resp  Min: 16  Max: 24   SpO2  Min: 93 %  Max: 100 %   No Data Recorded        Physical Exam:   GENERAL: Awake, no distress   HEENT: No adenopathy or thyromegaly   LUNGS: No wheezes or rhonchi   HEART: Regular rate and rhythm without murmurs   GI: Distended, soft.  Midline incision healing well.  Drain in place.  PEG site looks okay.  Mildly diffusely tender but soft   EXTREMITIES: Palpable pulses.  No clubbing or cyanosis   NEURO/PSYCH: Awake and alert.  Follows commands.  No  overt deficit    Results from last 7 days   Lab Units  01/13/19   0352  01/11/19   0416  01/10/19   0521   WBC 10*3/mm3  7.32  13.86*  16.22*   HEMOGLOBIN g/dL  7.6*  7.8*  8.6*   PLATELETS 10*3/mm3  389  342  311     Results from last 7 days   Lab Units  01/14/19   0430  01/13/19   0352  01/12/19   0421   SODIUM mmol/L  131*  135  130*   POTASSIUM mmol/L  4.1  4.0  4.0   CO2 mmol/L  25.0  25.0  26.0   BUN mg/dL  24*  24*  27*   CREATININE mg/dL  0.84  0.88  0.88   MAGNESIUM mg/dL  2.0  2.0  1.8  2.2   PHOSPHORUS mg/dL  3.9  3.9  3.9  4.0   GLUCOSE mg/dL  113*  146*  183*     Estimated Creatinine Clearance: 100.2 mL/min (by C-G formula based on SCr of 0.84 mg/dL).          Results from last 7 days   Lab Units  01/10/19   1634   PH, ARTERIAL pH units  7.519*   PCO2, ARTERIAL mm Hg  28.5   PO2 ART mm Hg  76.8*     Lab Results   Component Value Date    LACTATE 2.0 01/07/2019          Images: CT scan of the abdomen and pelvis from 1/11/2019 reviewed on PACS. Perihepatic abscess noted    I reviewed the patient's results and images.     Impression      Active Hospital Problems    Diagnosis   • **Malignant neoplasm of head of pancreas (CMS/HCC)     Added automatically from request for surgery [id number]     • Adult necrotizing enterocolitis (CMS/HCC)   • Encephalopathy   • Atrial fibrillation with RVR (CMS/HCC)   • Post-operative confusion and somnolence, likely due to oversedation   • Chronic anticoagulation, Eliquis   • Paroxysmal atrial fibrillation   • DM2 (diabetes mellitus, type 2) (CMS/HCC)   • Gastroesophageal reflux disease without esophagitis   • COPD (chronic obstructive pulmonary disease) (CMS/HCC)   • Hyperlipidemia   • Essential hypertension         Plan        Push mobilization  Continue TPN; PO intake still poor  Continue antimicrobial therapy per infectious disease    Transfer to the floor when okay with general surgery      Plan of care and goals reviewed with multidisciplinary team at daily rounds   I  discussed the patient's findings and my recommendations with patient, family and nursing staff   Level of Risk High due to:  severe exacerbation of chronic illness and illness with threat to life or bodily function    ZEYNEP Cabrera MD  Pulmonary and Critical Care Medicine

## 2019-01-14 NOTE — PLAN OF CARE
Problem: Patient Care Overview  Goal: Plan of Care Review  Outcome: Ongoing (interventions implemented as appropriate)   01/14/19 2911   OTHER   Outcome Summary Pt demo significantly improved independence and activity tolerance. Pt Min Ax2 for STS t/fs and to ambulate 8 ft w/ RW. Pt conts to be limited d/t generalized weakness and c/o abdominal pain, though demo excellent effort. Recommend cont skilled IPOT POC.    Coping/Psychosocial   Plan of Care Reviewed With patient   Plan of Care Review   Progress improving

## 2019-01-14 NOTE — THERAPY TREATMENT NOTE
Acute Care - Occupational Therapy Treatment Note  [facility]     Patient Name: Todd Phillips  : 1950  MRN: [id number]  Today's Date: 2019  Onset of Illness/Injury or Date of Surgery: 18  Date of Referral to OT: 19  Referring Physician: MD Mayuri    Admit Date: 2018       ICD-10-CM ICD-9-CM   1. Impaired functional mobility, balance, gait, and endurance Z74.09 V49.89   2. Laboratory test Z01.89 V72.60   3. Pancreatic cancer (CMS/HCC) C25.9 157.9   4. 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)   • DM2 (diabetes mellitus, type 2) (CMS/HCC)   • Paroxysmal atrial fibrillation   • Chronic anticoagulation, Eliquis   • Encounter for monitoring flecainide therapy   • Malignant neoplasm of head of pancreas (CMS/HCC)   • Bacteremia due to Escherichia coli   • Biliary obstruction   • Thrombocytopenia (CMS/HCC)   • Anemia due to chemotherapy   • Fever   • Post-operative confusion and somnolence, likely due to oversedation   • Atrial fibrillation with RVR (CMS/HCC)   • Adult necrotizing enterocolitis (CMS/HCC)   • Encephalopathy     Past Medical History:   Diagnosis Date   • Antral gastritis    • Asthma    • Atrial fibrillation (CMS/HCC)     treated with oral blood thinner   • Chest pain    • COPD (chronic obstructive pulmonary disease) (CMS/HCC)    • Coronary artery disease     no stents   • Diabetes mellitus (CMS/HCC)     type 2 - checks sugar 4 times per day    • Duodenitis    • Elevated cholesterol    • Emphysema of lung (CMS/HCC)    • Gallbladder disease     removed    • GERD (gastroesophageal reflux disease)    • Hard to  intubate     busted lip last time   • Hyperlipidemia    • Hypertension    • Jaundice    • Kidney stone    • Kidney stones     had lithotripsy and passed 36 kidney stones as well as had one surgically removed.   • Malignant neoplasm of head of pancreas (CMS/HCC) 9/5/2018   • Nausea    • Obstructive sleep apnea on CPAP     compliant with machine    • Palpitations    • Pneumonia 08/2018   • Reflux esophagitis    • Renal failure     stage 3 kidney disease   • Sepsis (CMS/HCC)      Past Surgical History:   Procedure Laterality Date   • BILE DUCT STENT PLACEMENT      [facility] 2 weeks ago    • CARDIAC CATHETERIZATION  11/01/1999   • CARDIOVASCULAR STRESS TEST  09/2012   • CHOLECYSTECTOMY N/A 8/10/2018    Procedure: CHOLECYSTECTOMY LAPAROSCOPIC;  Surgeon: Ronak Downs MD;  Location: [facility] OR;  Service: General   • COLONOSCOPY     • CYSTOSCOPY BLADDER STONE LITHOTRIPSY     • ECHO - CONVERTED  09/2012   • ERCP N/A 8/14/2018    Procedure: ENDOSCOPIC RETROGRADE CHOLANGIOPANCREATOGRAPHY WITH PAPILLOTOMY;  Surgeon: Scot Su MD;  Location:  [facility] OR;  Service: Gastroenterology   • ERCP N/A 10/1/2018    Procedure: ENDOSCOPIC RETROGRADE CHOLANGIOPANCREATOGRAPHY;  Surgeon: Jefry Luna MD;  Location:  [facility];  Service: Gastroenterology   • KIDNEY STONE SURGERY     • KIDNEY STONE SURGERY      open abdominal surgery   • PORTACATH PLACEMENT Right 10/23/2018    Procedure: INSERTION OF PORTACATH;  Surgeon: Ronak Downs MD;  Location:  [facility] OR;  Service: General   • TONSILLECTOMY     • UPPER GASTROINTESTINAL ENDOSCOPY  08/30/2012   • WHIPPLE PROCEDURE N/A 12/31/2018    Procedure: WHIPPLE PROCEDURE, BIOPSY OF LIVER, PORTAL VEIN RESECTION AND REPAIR, G/J TUBE PLACEMENT;  Surgeon: Miquel Brody MD;  Location:  [facility] OR;  Service: General       Therapy Treatment    Rehabilitation Treatment Summary     Row Name 01/14/19 0810             Treatment Time/Intention     Discipline  occupational therapist  -CL      Document Type  therapy note (daily note)  -CL      Subjective Information  complains of;pain  -CL      Patient Effort  excellent  -CL      Existing Precautions/Restrictions  fall;other (see comments)  (Significant)  MADDY drain, G/J tube, abdominal incision  -CL      Recorded by [CL] Martha Blackwood OT 01/14/19 0908      Row Name 01/14/19 0810             Vital Signs    Pre Systolic BP Rehab  145  -CL      Pre Treatment Diastolic BP  84  -CL      Pretreatment Heart Rate (beats/min)  81  -CL      Pre SpO2 (%)  96  -CL      O2 Delivery Pre Treatment  room air  -CL      Pre Patient Position  Supine  -CL      Intra Patient Position  Standing  -CL      Post Patient Position  Sitting  -CL      Recorded by [CL] Martha Blackwood OT 01/14/19 0908      Row Name 01/14/19 0810             Cognitive Assessment/Intervention- PT/OT    Affect/Mental Status (Cognitive)  flat/blunted affect  -CL      Orientation Status (Cognition)  oriented to;person;verbal cues/prompts needed for orientation;place;time  -CL      Follows Commands (Cognition)  follows one step commands;75-90% accuracy;verbal cues/prompting required;repetition of directions required;increased processing time needed  -CL      Cognitive Function (Cognitive)  safety deficit  -CL      Safety Deficit (Cognitive)  awareness of need for assistance;safety precautions awareness;mild deficit  -CL      Personal Safety Interventions  fall prevention program maintained;gait belt;nonskid shoes/slippers when out of bed  -CL      Recorded by [CL] Martha Blackwood OT 01/14/19 0911      Row Name 01/14/19 0810             Bed Mobility Assessment/Treatment    Bed Mobility Assessment/Treatment  supine-sit  -CL      Supine-Sit [place] (Bed Mobility)  moderate assist (50% patient effort);2 person assist;verbal cues  -CL      Assistive Device (Bed Mobility)  bed rails;draw sheet;head of bed elevated  -CL      Recorded by [CL] Martha Blackwood OT 01/14/19  0911      Row Name 01/14/19 0810             Functional Mobility    Functional Mobility- Ind. Level  minimum assist (75% patient effort);2 person assist required;verbal cues required  -CL      Functional Mobility- Device  rolling walker  -CL      Functional Mobility-Distance (Feet)  8  -CL      Functional Mobility- Comment  Pt w/ posterior lean, VCs for posture, followed closely w/ chair.   -CL      Recorded by [CL] Martha Blackwood, OT 01/14/19 0911      Row Name 01/14/19 0810             Transfer Assessment/Treatment    Transfer Assessment/Treatment  sit-stand transfer;stand-sit transfer;bed-chair transfer  -CL      Comment (Transfers)  Pt performed x2 reps. Pt stood on first rep and took 3-4 sides steps to the chair w/ BUE support. Pt then stood w/ RW w/ VCs for posture d/t posterior lean.   -CL      Recorded by [CL] Martha Blackwood, OT 01/14/19 0916      Row Name 01/14/19 0810             Bed-Chair Transfer    Bed-Chair [place] (Transfers)  moderate assist (50% patient effort);2 person assist;verbal cues  -CL      Recorded by [CL] Martha Blackwood, OT 01/14/19 0916      Row Name 01/14/19 0810             Sit-Stand Transfer    Sit-Stand [place] (Transfers)  minimum assist (75% patient effort);2 person assist;verbal cues  -CL      Assistive Device (Sit-Stand Transfers)  walker, front-wheeled  -CL      Recorded by [CL] Martha Blackwood, OT 01/14/19 0916      Row Name 01/14/19 0810             Stand-Sit Transfer    Stand-Sit [place] (Transfers)  minimum assist (75% patient effort);2 person assist;verbal cues  -CL      Assistive Device (Stand-Sit Transfers)  walker, front-wheeled  -CL      Recorded by [CL] Martha Blackwood OT 01/14/19 0916      Row Name 01/14/19 0810             Therapeutic Exercise    06730 - OT Therapeutic Exercise Minutes  4  -CL      14492 - OT Therapeutic Activity Minutes  19  -CL      Recorded by [CL] Martha Blackwood OT 01/14/19 0916      Row Name 01/14/19 0810             Upper Extremity Supine  Therapeutic Exercise    Performed, Supine Upper Extremity (Therapeutic Exercise)  -- Deferred, resp present  -CL      Recorded by [CL] Martha Blackwood OT 01/14/19 0916      Row Name 01/14/19 0810             Lower Extremity Seated Therapeutic Exercise    Performed, Seated Lower Extremity (Therapeutic Exercise)  hip flexion/extension;LAQ (long arc quad), knee extension;ankle dorsiflexion/plantarflexion  -CL      Exercise Type, Seated Lower Extremity (Therapeutic Exercise)  AROM (active range of motion)  -CL      Sets/Reps Detail, Seated Lower Extremity (Therapeutic Exercise)  1/10  -CL      Recorded by [CL] Martha Blackwood OT 01/14/19 0916      Row Name 01/14/19 0810             Static Sitting Balance    Level of [place] (Unsupported Sitting, Static Balance)  contact guard assist  -CL      Sitting Position (Unsupported Sitting, Static Balance)  sitting in chair;sitting on edge of bed  -CL      Time Able to Maintain Position (Unsupported Sitting, Static Balance)  more than 5 minutes  -CL      Recorded by [CL] Martha Blackwood OT 01/14/19 0916      Row Name 01/14/19 0810             Static Standing Balance    Level of [place] (Supported Standing, Static Balance)  minimal assist, 75% patient effort x2  -CL      Assistive Device Utilized (Supported Standing, Static Balance)  rolling walker  -CL      Recorded by [CL] Martha Blackwood OT 01/14/19 0916      Row Name 01/14/19 0810             Positioning and Restraints    Pre-Treatment Position  in bed  -CL      Post Treatment Position  chair  -CL      In Chair  notified nsg;reclined;call light within reach;encouraged to call for assist;exit alarm on;with family/caregiver;waffle cushion;on mechanical lift sling;legs elevated;heels elevated  -CL      Recorded by [CL] Martha Blackwood OT 01/14/19 0916      Row Name 01/14/19 0810             Pain Assessment    Additional Documentation  Pain Scale: Numbers Pre/Post-Treatment (Group)  -CL      Recorded by [CL] Martha Blackwood, OT  01/14/19 0916      Row Name 01/14/19 0810             Pain Scale: Numbers Pre/Post-Treatment    Pain Scale: Numbers, Pretreatment  4/10  -CL      Pain Scale: Numbers, Post-Treatment  4/10  -CL      Pain Location  abdomen  -CL      Pre/Post Treatment Pain Comment  Tolerated.   -CL      Pain Intervention(s)  Repositioned;Ambulation/increased activity  -CL      Recorded by [CL] Martha Blackwood OT 01/14/19 0916        User Key  (r) = Recorded By, (t) = Taken By, (c) = Cosigned By    Initials Name Effective Dates Discipline    CL Martha Blackwood, LORENA 04/03/18 -  OT        Wound upper;midline abdomen incision (Active)   Dressing Appearance open to air 1/14/2019  6:00 AM   Closure [place];Approximated;Open to air 1/14/2019  6:00 AM   Base clean;dry;pink 1/14/2019  4:00 AM   Periwound intact;dry 1/14/2019  6:00 AM   Periwound Temperature warm 1/14/2019  4:00 AM   Periwound Skin Turgor firm 1/14/2019  4:00 AM   Drainage Amount none 1/14/2019  6:00 AM   Dressing Care, Wound open to air 1/14/2019  4:00 AM   Wound Output (mL) 0 1/14/2019  4:00 AM       Occupational Therapy Education     Title: PT OT SLP Therapies (In Progress)     Topic: Occupational Therapy (In Progress)     Point: ADL training (In Progress)     Description: Instruct learner(s) on proper safety adaptation and remediation techniques during self care or transfers.   Instruct in proper use of assistive devices.    Learning Progress Summary           Patient Acceptance, E, NR by CL at 1/14/2019  9:16 AM    Comment:  Pt educated on appropriate safety precautions, t/f techniques, role of OT, HEP, and benefits of therapy.    Acceptance, E, NR,VU by DC at 1/11/2019  7:19 PM    Acceptance, E,H, NR by CL at 1/9/2019  3:46 PM    Comment:  Pt educated on appropriate safety precautions, PROM HEP, positioning, and appropriate progression of therapy based on level of performance.    Acceptance, E, NR by CL at 1/7/2019  3:58 PM    Comment:  Pt educated on appropriate safety  precautions, t/f techniques, role of OT, positioning, and benefits of therapy.    Acceptance, E,D,TB, VU,NR by TB at 1/4/2019  3:55 PM    Comment:  Education initiated for benefits of OOB activity/therapy, role of OT, transfer training, HEP and recommendation for rehab at d/c   Family Acceptance, E,H, NR by CL at 1/9/2019  3:46 PM    Comment:  Pt educated on appropriate safety precautions, PROM HEP, positioning, and appropriate progression of therapy based on level of performance.    Acceptance, E, NR by CL at 1/7/2019  3:58 PM    Comment:  Pt educated on appropriate safety precautions, t/f techniques, role of OT, positioning, and benefits of therapy.    Acceptance, E,D,TB, VU,NR by TB at 1/4/2019  3:55 PM    Comment:  Education initiated for benefits of OOB activity/therapy, role of OT, transfer training, HEP and recommendation for rehab at d/c                   Point: Home exercise program (In Progress)     Description: Instruct learner(s) on appropriate technique for monitoring, assisting and/or progressing therapeutic exercises/activities.    Learning Progress Summary           Patient Acceptance, E, NR by CL at 1/14/2019  9:16 AM    Comment:  Pt educated on appropriate safety precautions, t/f techniques, role of OT, HEP, and benefits of therapy.    Acceptance, E, NR,VU by DC at 1/11/2019  7:19 PM    Acceptance, E,H, NR by CL at 1/9/2019  3:46 PM    Comment:  Pt educated on appropriate safety precautions, PROM HEP, positioning, and appropriate progression of therapy based on level of performance.    Acceptance, E,D,TB, VU,NR by TB at 1/4/2019  3:55 PM    Comment:  Education initiated for benefits of OOB activity/therapy, role of OT, transfer training, HEP and recommendation for rehab at d/c   Family Acceptance, E,H, NR by CL at 1/9/2019  3:46 PM    Comment:  Pt educated on appropriate safety precautions, PROM HEP, positioning, and appropriate progression of therapy based on level of performance.    Acceptance,  E,D,TB, VU,NR by TB at 1/4/2019  3:55 PM    Comment:  Education initiated for benefits of OOB activity/therapy, role of OT, transfer training, HEP and recommendation for rehab at d/c                   Point: Precautions (In Progress)     Description: Instruct learner(s) on prescribed precautions during self-care and functional transfers.    Learning Progress Summary           Patient Acceptance, E, NR by CL at 1/14/2019  9:16 AM    Comment:  Pt educated on appropriate safety precautions, t/f techniques, role of OT, HEP, and benefits of therapy.    Acceptance, E, NR,VU by DC at 1/11/2019  7:19 PM    Acceptance, E,H, NR by CL at 1/9/2019  3:46 PM    Comment:  Pt educated on appropriate safety precautions, PROM HEP, positioning, and appropriate progression of therapy based on level of performance.    Acceptance, E, NR by CL at 1/7/2019  3:58 PM    Comment:  Pt educated on appropriate safety precautions, t/f techniques, role of OT, positioning, and benefits of therapy.   Family Acceptance, E,H, NR by CL at 1/9/2019  3:46 PM    Comment:  Pt educated on appropriate safety precautions, PROM HEP, positioning, and appropriate progression of therapy based on level of performance.    Acceptance, E, NR by CL at 1/7/2019  3:58 PM    Comment:  Pt educated on appropriate safety precautions, t/f techniques, role of OT, positioning, and benefits of therapy.                   Point: Body mechanics (In Progress)     Description: Instruct learner(s) on proper positioning and spine alignment during self-care, functional mobility activities and/or exercises.    Learning Progress Summary           Patient Acceptance, E, NR by CL at 1/14/2019  9:16 AM    Comment:  Pt educated on appropriate safety precautions, t/f techniques, role of OT, HEP, and benefits of therapy.    Acceptance, E, NR,VU by DC at 1/11/2019  7:19 PM    Acceptance, E, NR by CL at 1/7/2019  3:58 PM    Comment:  Pt educated on appropriate safety precautions, t/f techniques,  role of OT, positioning, and benefits of therapy.   Family Acceptance, E, NR by CL at 1/7/2019  3:58 PM    Comment:  Pt educated on appropriate safety precautions, t/f techniques, role of OT, positioning, and benefits of therapy.                               User Key     Initials Effective Dates Name Provider Type Discipline    TB 06/08/18 -  Corina Nelson, OT Occupational Therapist OT    CL 04/03/18 -  Martha Blackwood OT Occupational Therapist OT    DC 11/26/18 -  Freddie Ledezma, RN Registered Nurse Nurse                OT Recommendation and Plan  Outcome Summary/Treatment Plan (OT)  Anticipated Equipment Needs at Discharge (OT): (TBA further)  Anticipated Discharge Disposition (OT): skilled nursing facility  Therapy Frequency (OT Eval): daily  Plan of Care Review  Plan of Care Reviewed With: patient  Plan of Care Reviewed With: patient  Outcome Summary: Pt demo significantly improved independence and activity tolerance. Pt Min Ax2 for STS t/fs and to ambulate 8 ft w/ RW. Pt conts to be limited d/t generalized weakness and c/o abdominal pain, though demo excellent effort. Recommend cont skilled IPOT POC.   Outcome Measures     Row Name 01/14/19 0810 01/13/19 1320 01/11/19 1452       How much help from another person do you currently need...    Turning from your back to your side while in flat bed without using bedrails?  --  2  -SJ  2  -LS    Moving from lying on back to sitting on the side of a flat bed without bedrails?  --  2  -SJ  2  -LS    Moving to and from a bed to a chair (including a wheelchair)?  --  2  -SJ  2  -LS    Standing up from a chair using your arms (e.g., wheelchair, bedside chair)?  --  3  -SJ  2  -LS    Climbing 3-5 steps with a railing?  --  1  -SJ  1  -LS    To walk in hospital room?  --  2  -SJ  1  -LS    AM-PAC 6 Clicks Score  --  12  -SJ  10  -LS       How much help from another is currently needed...    Putting on and taking off regular lower body clothing?  1  -CL  --  --     Bathing (including washing, rinsing, and drying)  1  -CL  --  --    Toileting (which includes using toilet bed pan or urinal)  1  -CL  --  --    Putting on and taking off regular upper body clothing  2  -CL  --  --    Taking care of personal grooming (such as brushing teeth)  2  -CL  --  --    Eating meals  3  -CL  --  --    Score  10  -CL  --  --       Functional Assessment    Outcome Measure Options  AM-PAC 6 Clicks Daily Activity (OT)  -CL  AM-PAC 6 Clicks Basic Mobility (PT)  -SJ  AM-PAC 6 Clicks Basic Mobility (PT)  -LS      User Key  (r) = Recorded By, (t) = Taken By, (c) = Cosigned By    Initials Name Provider Type    Reta Chen, PT Physical Therapist    LS Yakelin Alberts, PT Physical Therapist    CL Martha Blackwood, OT Occupational Therapist           Time Calculation:   Time Calculation- OT     Row Name 01/14/19 0810             Time Calculation- OT    OT Start Time  0810  -CL      OT Received On  01/14/19  -CL      OT Goal Re-Cert Due Date  01/17/19  -CL         Timed Charges    61345 - OT Therapeutic Exercise Minutes  4  -CL      74919 - OT Therapeutic Activity Minutes  19  -CL        User Key  (r) = Recorded By, (t) = Taken By, (c) = Cosigned By    Initials Name Provider Type    CL Martha Blackwood OT Occupational Therapist           Therapy Suggested Charges     Code   Minutes Charges    79808 (CPT®) Hc Ot Neuromusc Re Education Ea 15 Min      07872 (CPT®) Hc Ot Ther Proc Ea 15 Min 4 1    10151 (CPT®) Hc Ot Therapeutic Act Ea 15 Min 19 1    37662 (CPT®) Hc Ot Manual Therapy Ea 15 Min      97895 (CPT®) Hc Ot Iontophoresis Ea 15 Min      88305 (CPT®) Hc Ot Elec Stim Ea-Per 15 Min      29959 (CPT®) Hc Ot Ultrasound Ea 15 Min      62326 (CPT®) Hc Ot Self Care/Mgmt/Train Ea 15 Min      Total  23 2        Therapy Charges for Today     Code Description Service Date Service Provider Modifiers Qty    57858611550 HC OT THER PROC EA 15 MIN 1/14/2019 Martha Blackwood OT GO 1    14943737411 HC OT THERAPEUTIC ACT EA  15 MIN 1/14/2019 Martha Blackwood, OT GO 1    [id number]  OT THER SUPP EA 15 MIN 1/14/2019 Martha Blackwood OT GO 2               Martha Blackwood OT  1/14/2019

## 2019-01-14 NOTE — PROGRESS NOTES
Continued Stay Note  [facility]     Patient Name: Todd Phillips  MRN: [id number]  Today's Date: 1/14/2019    Admit Date: 12/31/2018    Discharge Plan     Row Name 01/14/19 1023       Plan    Plan  Update    Patient/Family in Agreement with Plan  yes    Plan Comments  Per discussion in rounds [facility], I updated April at [facility].    April has a bed today Dr Rivas wants to keep patient another day.Pt will transfer to [facility] tomorrow. Nurse will call report  [phone] ext [phone]. They will get the transfer summary out of [facility]. [facility] will transport at noon. CMN is in front of chart. I will notify Mc NP. Patient and family aware.          Discharge Codes    No documentation.       Expected Discharge Date and Time     Expected Discharge Date Expected Discharge Time    Reji 15, 2019             Mee Palmer RN

## 2019-01-14 NOTE — PROGRESS NOTES
Clinical Nutrition     Nutrition Support Assessment  Reason for Visit:   MDR, Follow-up protocol, PN      Patient Name: Todd Phillips  YOB: 1950  MRN: [id number]  Date of Encounter: 01/14/19 12:19 PM  Admission date: 12/31/2018      Nutrition Assessment   Assessment       Hospital Problem List    Malignant neoplasm of head of pancreas (CMS/HCC)    Hyperlipidemia    COPD (chronic obstructive pulmonary disease) (CMS/HCC)    Essential hypertension    Gastroesophageal reflux disease without esophagitis    DM2 (diabetes mellitus, type 2) (CMS/[facility])    Paroxysmal atrial fibrillation    Chronic anticoagulation, Eliquis    Post-operative confusion and somnolence, likely due to oversedation    Atrial fibrillation with RVR (CMS/HCC)    Adult necrotizing enterocolitis (CMS/[facility])    Encephalopathy      PMH: He  has a past medical history of Antral gastritis, Asthma, Atrial fibrillation (CMS/HCC), Chest pain, COPD (chronic obstructive pulmonary disease) (CMS/HCC), Coronary artery disease, Diabetes mellitus (CMS/HCC), Duodenitis, Elevated cholesterol, Emphysema of lung (CMS/[facility]), Gallbladder disease, GERD (gastroesophageal reflux disease), Hard to intubate, Hyperlipidemia, Hypertension, Jaundice, Kidney stone, Kidney stones, Malignant neoplasm of head of pancreas (CMS/HCC) (9/5/2018), Nausea, Obstructive sleep apnea on CPAP, Palpitations, Pneumonia (08/2018), Reflux esophagitis, Renal failure, and Sepsis (CMS/[facility]).   PSxH: He  has a past surgical history that includes Upper gastrointestinal endoscopy (08/30/2012); Cardiac catheterization (11/01/1999); Kidney stone surgery; Echo - Converted (09/2012); Cardiovascular stress test (09/2012); cystoscopy bladder stone lithotripsy; Kidney stone surgery; Bile duct stent placement; Colonoscopy; Tonsillectomy; WHIPPLE PROCEDURE, BIOPSY OF LIVER, PORTAL VEIN RESECTION AND REPAIR, G/J TUBE PLACEMENT (N/A, 12/31/2018); INSERTION OF PORTACATH (Right, 10/23/2018);  ENDOSCOPIC RETROGRADE CHOLANGIOPANCREATOGRAPHY (N/A, 10/1/2018); ENDOSCOPIC ULTRASOUND (UPPER) (N/A, 9/28/2018); ENDOSCOPIC RETROGRADE CHOLANGIOPANCREATOGRAPHY WITH PAPILLOTOMY (N/A, 8/14/2018); and CHOLECYSTECTOMY LAPAROSCOPIC (N/A, 8/10/2018).       Reported/Observed/Food/Nutrition Related History:     Pt sitting up in chair, very frail, not feeling well, c/o abdominal pain    Per wife: pt sick to stomach, did not touch lunch    Per RN: pt ate ~10% of breakfast, had 75ml from abdominal drain, this shifr, 250ml last night      Labs reviewed     Results from last 7 days   Lab Units  01/14/19   0430  01/13/19   0352  01/12/19   0421   01/10/19   0521   GLUCOSE mg/dL  113*  146*  183*   < >  208*   BUN mg/dL  24*  24*  27*   < >  27*   CREATININE mg/dL  0.84  0.88  0.88   < >  0.81   SODIUM mmol/L  131*  135  130*   < >  129*   CHLORIDE mmol/L  101  105  100   < >  101   POTASSIUM mmol/L  4.1  4.0  4.0   < >  3.7   PHOSPHORUS mg/dL  3.9  3.9  3.9  4.0   < >  3.6   MAGNESIUM mg/dL  2.0  2.0  1.8  2.2   < >  2.1   ALT (SGPT) U/L  30  41*   --    --   45*    < > = values in this interval not displayed.     Results from last 7 days   Lab Units  01/14/19   0430  01/13/19   0352  01/10/19   0521  01/09/19   0417  01/08/19   0311   ALBUMIN g/dL  2.51*  2.53*  2.71*  2.73*  2.64*   IONIZED CALCIUM mmol/L   --    --    --   1.22  1.26   CHOLESTEROL mg/dL  51   --    --    --    --    TRIGLYCERIDES mg/dL  71   --    --    --    --        Results from last 7 days   Lab Units  01/14/19   1206  01/14/19   0521  01/13/19   2311  01/13/19   1745  01/13/19   1159  01/13/19   0555   GLUCOSE mg/dL  225*  129  119  99  208*  223*     Lab Results   Lab Value Date/Time    HGBA1C 6.10 (H) 01/01/2019 0542    HGBA1C 7.00 (H) 12/08/2018 1513         Medications reviewed   Pertinent: insulin, abx, protonix, scopalamine, zofran    Intake/Ouptut 24 hrs (7:00AM - 6:59 AM)     Intake & Output (last day)       01/13 0701 - 01/14 0700 01/14 0701 -  01/15 0700    P.O. 600 500    I.V. (mL/kg) 1374 (13.7)     Other 30     NG/GT      IV Piggyback  331         Total Intake(mL/kg) 2889 (28.9) 831 (8.3)    Urine (mL/kg/hr) 2050 (0.9) 550 (1)    Emesis/NG output      Drains 550 75    Stool      Wound 0     Total Output 2600 625    Net +289 +206                     GI: abdominal pain, nausea    SKIN: surgical site    Current Nutrition Prescription     PO: Adult Central 2-in-1 TPN  Adult Central 2-in-1 TPN  Diet Soft Texture; [employer]; GI Soft / [place]    D20%  AA7% @60ml/hr, 200ml SMOF lipids daily    PN Intake:790ml PN, 55% goal volume,  95ml lipids, 48% goal volume    TPN at goal volume will provide 1779kcal (86% kcal needs), 100g protein (73% protein needs)      Po Intake: 21% of 4 meals    Nutrition Diagnosis       Problem Altered GI function   Etiology Pancreatic CA/ NEC   Signs/Symptoms Nausea, Abdominal Pain, currently on TPN, po diet not established yet       Nutrition Intervention   1.  Follow treatment progress, Interview for preferences, Menu adjusted, Supplement provided    Boost GC 3x/day      Goal:   General: Nutrition support treatment  PO: Tolerate PO  EN/PN: Maintain PN      Monitoring/Evaluation:   I&O, PO intake, Pertinent labs, PN delivery/tolerance, Weight, Skin status, GI status, Symptoms      Will Continue to follow per protocol      Joanne Lamas RD  Time Spent: 30min

## 2019-01-14 NOTE — PROGRESS NOTES
[facility] Progress Note        Antibiotics:  Anti-Infectives (From admission, onward)    Ordered     Dose/Rate Route Frequency Start Stop    01/07/19 0942  Linezolid (ZYVOX) 600 mg 300 mL     Ordering Provider:  Scot Higgins MD    600 mg  300 mL/hr over 60 Minutes Intravenous Every 12 Hours Scheduled 01/07/19 1100 01/17/19 0859    01/06/19 1306  meropenem (MERREM) 1 g/100 mL 0.9% NS VTB (mbp)     Scot Higgins MD reviewed the order on 01/11/19 0842.   Ordering Provider:  Scot Higgins MD    1 g  over 3 Hours Intravenous Every 8 Hours 01/06/19 1600 01/17/19 2359    01/05/19 1820  micafungin 100 mg/100 mL 0.9% NS IVPB (mbp)     Scot Higgins MD reviewed the order on 01/10/19 0719.   Ordering Provider:  Scot Higgins MD    100 mg  over 60 Minutes Intravenous Every 24 Hours 01/05/19 2000 01/17/19 0718    01/05/19 1859  meropenem (MERREM) 1 g/100 mL 0.9% NS VTB (mbp)     Ordering Provider:  Levi Bustos, RPH    1 g  over 30 Minutes Intravenous Once 01/05/19 1945 01/05/19 2039 01/05/19 1859  vancomycin 2250 mg/500 mL 0.9% NS IVPB ([facility])     Ordering Provider:  Levi Bustos RPH    2,250 mg Intravenous Once 01/05/19 1945 01/05/19 2009 01/01/19 0936  piperacillin-tazobactam (ZOSYN) 3.375 g in iso-osmotic dextrose 50 ml (premix)     Ordering Provider:  Miquel Brody MD    3.375 g  over 30 Minutes Intravenous Once 01/01/19 1030 01/01/19 1209    12/31/18 0632  ceFAZolin in dextrose (ANCEF) IVPB solution 2 g     Ordering Provider:  Miquel Brody MD    2 g  over 30 Minutes Intravenous Once 12/31/18 0634 12/31/18 0800    12/31/18 0632  metroNIDAZOLE (FLAGYL) IVPB 500 mg     Ordering Provider:  Miquel Brody MD    500 mg  over 60 Minutes Intravenous Once 12/31/18 0634 12/31/18 0830          CC: fatigue, abd pain    HPI:    Patient is a 68 y.o.  Yr old male with history of biliary tract obstruction, admitted to [facility] multiple times in the last  2 months including August 10 until August 16, underwent cholecystectomy with pathology suggesting chronic cholecystitis, had ERCP on August 14 with biliary stricture/proximal duct dilation and had stent placement.  He was readmitted August 21, 2018 until August 25, 2018 with acute sepsis, Klebsiella bacteremia, discharged with oral antibiotics.  Readmitted September 12, 2018 until September 17, 2018 with ESBL Escherichia coli bacteremia seen by infectious disease in [place] and treated with Invanz, duration of therapy unclear but approximately until September 24.  During that period of time, concern for malignancy mentioned in notes and referred to [facility] for further biopsy, done September 28.  Presented once again to [facility] emergency room September 30, 2018 with acute onset abdominal pain/nausea/vomiting.  Blood cultures positive again with  Klebsiella species and  Transferred to [facility]. 10/1/18 ERCP with evidence for obstruction/purulence and new stent placed; He was treated with rocephin/flagyl and last seen by us early October; after that, he had more definitive diagnosis of pancreatic cancer and treated with neoadjuvant chemotherapy and right chest port placement.  In addition he required TPN.  Family not aware of any other specific microbiologic diagnosis since October; he was admitted December 31 and underwent pancreaticoduodenectomy, wedge biopsy of liver tumor and portal vein resection/lateral repair with gastrojejunostomy tube placement.  Moved to ICU on January 5 with concern regarding increased confusion/Tachypnea, abnormal CT scan with pneumatosis/ileus and portal venous gas in addition to intra-abdominal fluid per description of radiology.  Chest x-ray January 6 with low lung volumes and increased markings at lung bases bilaterally but no focal parenchymal opacification per radiology.  Head CT no acute intracranial abnormality.    1/11 perc drain with  "1.8 liters fluid removed per radiology     1/14/19 seen early and sleepy;  Patient  sleepy/Confused and does not cooperate with a history or review of systems.  He answers yes/no to some simple questions but unclear reliability.  Nursing reports left arm PICC line placed January 6 and remains stable, chronic right port in the chest with no new redness/swelling or change there.   No rash.  On nasal cannula oxygen with no productive cough. LG fever at 99 last 24 hours     Patient does not cooperate with ROS otherwise      ROS:        1/14/19 as above, patient confused and not cooperative with review of systems          PE:   /88 (BP Location: Right arm, Patient Position: Lying)   Pulse 80   Temp 98.6 °F (37 °C) (Bladder)   Resp 18   Ht 182.9 cm (72\")   Wt 100 kg (220 lb 14.4 oz)   SpO2 100%   BMI 29.96 kg/m²       GENERAL: sleepy.  RA  HEENT: Normocephalic, atraumatic.  PERRL. EOMI. No conjunctival injection. No icterus. Oropharynx clear without evidence of thrush or exudate. No evidence of peridontal disease.    NECK: Supple without nuchal rigidity. No mass.  LYMPH: No cervical, axillary or inguinal lymphadenopathy.  HEART: RRR; No murmur, rubs, gallops.   LUNGS: Diminished at bases bilaterally without wheezing, rales, rhonchi. Normal respiratory effort. Nonlabored. No dullness.  ABDOMEN: Soft, tender and distended. Positive bowel sounds managed. No rebound or guarding. NO mass or HSM.  Ostomy bag noted at right abdomen but no active drainage or fluid in bag.  Surgical site noted with no redness induration or warmth.  No mass bulge or fluctuance.  No crepitus or bulla.  Feeding tube site noted with no redness induration or warmth.  No mass bulge or fluctuance appreciated.  No crepitus or bulla.  No purulence or skin necrosis  EXT:  No cyanosis, clubbing. No cord.  : Genitalia generally unremarkable.  With Hassan catheter.  MSK: FROM without joint effusions noted arms/legs.    SKIN: Warm and dry without " cutaneous eruptions on Inspection/palpation.    NEURO: sleepy     Right chest port with no redness induration or warmth.  No crepitus or bulla.  No purulence     Left arm PICC line with no redness or purulence     No peripheral stigmata/phenomena of endocarditis        Laboratory Data    Results from last 7 days   Lab Units  01/13/19   0352  01/11/19   0416  01/10/19   0521   WBC 10*3/mm3  7.32  13.86*  16.22*   HEMOGLOBIN g/dL  7.6*  7.8*  8.6*   HEMATOCRIT %  23.8*  24.0*  26.4*   PLATELETS 10*3/mm3  389  342  311     Results from last 7 days   Lab Units  01/14/19   0430   SODIUM mmol/L  131*   POTASSIUM mmol/L  4.1   CHLORIDE mmol/L  101   CO2 mmol/L  25.0   BUN mg/dL  24*   CREATININE mg/dL  0.84   GLUCOSE mg/dL  113*   CALCIUM mg/dL  8.0*     Results from last 7 days   Lab Units  01/14/19   0430   ALK PHOS U/L  161*   BILIRUBIN mg/dL  0.3   ALT (SGPT) U/L  30   AST (SGOT) U/L  26               Estimated Creatinine Clearance: 100.2 mL/min (by C-G formula based on SCr of 0.84 mg/dL).      Microbiology:      Radiology:  Imaging Results (last 72 hours)     Procedure Component Value Units Date/Time    XR Chest 1 View [[id number]] Collected:  01/06/19 0915     Updated:  01/07/19 1411    Narrative:          EXAMINATION: XR CHEST 1 VW - 1/6/2019     INDICATION: Z01.89-Encounter for other specified special examinations;  C25.9-Malignant neoplasm of pancreas, unspecified; Z74.09-Other reduced  mobility      COMPARISON: 01/05/2019     FINDINGS: Portable chest reveals low lung volumes. Deep line catheter on  the right with tip in the SVC. Mild increased markings at lung bases  bilaterally with degenerative changes seen within the spine.  No focal  parenchymal opacification present.       Impression:       Low lung volumes with increased markings at the lung bases  bilaterally.     DICTATED:   1/6/2019  EDITED/ls :   1/6/2019      This report was finalized on 1/7/2019 2:09 PM by Dr. Tasha Guzmán MD.       CT Head  Without Contrast [[id number]] Collected:  01/06/19 1803     Updated:  01/07/19 1410    Narrative:       EXAMINATION: CT HEAD WO CONTRAST - 1/6/2019     INDICATION:  Z01.89-Encounter for other specified special examinations;  C25.9-Malignant neoplasm of pancreas, unspecified; Z74.09-Other reduced  mobility. Mental status change.     TECHNIQUE: Multiple axial CT imaging is obtained of the head without the  administration of intravenous contrast.     The radiation dose reduction device was turned on for each scan per the  ALARA (As Low as Reasonably Achievable) protocol.     COMPARISON: 11/17/2018     FINDINGS: Atrophy of the brain with low-density area seen in the  periventricular and subcortical white matter suggesting chronic small  vessel ischemic change. No hemorrhage or hydrocephalus. No mass, mass  effect, or midline shift. No abnormal extra-axial fluid collections  identified. The bony structures reveal no evidence of osseous  abnormality. The visualized paranasal sinuses are clear. The mastoid air  cells are patent.       Impression:       Chronic changes seen within the brain with no acute  intracranial abnormality identified.     DICTATED:   1/6/2019  EDITED/ls :   1/6/2019         This report was finalized on 1/7/2019 2:08 PM by Dr. Tasha Guzmán MD.       XR Chest 1 View [[id number]] Collected:  01/05/19 1928     Updated:  01/06/19 1008    Narrative:          EXAMINATION: XR CHEST 1 VW - 1/5/2019     INDICATION:  Z01.89-Encounter for other specified special examinations;  C25.9-Malignant neoplasm of pancreas, unspecified; Z74.09-Other reduced  mobility      COMPARISON: 01/05/2019     FINDINGS: Portable chest reveals deep line catheter on the right with  tip in the SVC. Mild increased markings at left lung base with small  left pleural effusion. Improvement seen in aeration of the lung bases in  the interval. Degenerative change is seen within the spine. Pulmonary  vascularity is within normal limits.            Impression:       Improvement seen in aeration of the lung bases in the  interval with only minimal right residual markings at the lung bases.     DICTATED:   1/5/2019  EDITED/ls :   1/5/2019      This report was finalized on 1/6/2019 10:06 AM by Dr. Tasha Guzmán MD.       CT Abdomen Pelvis Without Contrast [[id number]] Collected:  01/05/19 1645     Updated:  01/05/19 1655    Narrative:       EXAMINATION: CT ABDOMEN/PELVIS WO CONTRAST - 1/5/2019      INDICATION:  Z01.89-Encounter for other specified special examinations;  C25.9-Malignant neoplasm of pancreas, unspecified; Z74.09-Other reduced  mobility. Abdominal pain.     TECHNIQUE: Multiple axial CT imaging is obtained of the abdomen and  pelvis without the administration of oral or intravenous contrast.     The radiation dose reduction device was turned on for each scan per the  ALARA (As Low as Reasonably Achievable) protocol.     COMPARISON: NONE     FINDINGS: There are small bilateral pleural effusions. Atelectatic  change is seen in the lung bases bilaterally. Extraluminal air is  identified throughout the upper abdomen. There is fluid seen surrounding  the spleen as well as the liver. There is a small to  moderate size  hiatal hernia. There is gastric distention. There are postoperative  changes seen from Whipple procedure. There is a small collection of  fluid identified along the leftward aspect of the liver. There is some  free fluid identified near the sarbjit hepatis. There is a G-tube  identified in satisfactory position. Kidneys and adrenal glands within  normal limits. There is gaseous distention of the small bowel loops  suggesting a postoperative ileus. There is air identified within the  mesenteric vessels. Findings are concerning for pneumatosis of several  small bowel loops centrally within the abdomen. There is fluid seen  scattered throughout the colon. Mild distention of the colon. There is  some fluid seen within the pelvis.      Pelvis: The pelvic portion of the gastrointestinal tract is within  normal limits. Fluid seen within the colon. The bladder reveals  air-fluid level likely from prior instrumentation. No pelvic adenopathy.  Bony structures reveal no evidence of osseous abnormality.       Impression:       There are small bilateral pleural effusions. There is a  fluid collection seen surrounding the liver and spleen containing air in  the fluid collection around the liver. There is air seen scattered  throughout the abdomen which may be postoperative. There is air  identified within the mesenteric vessels with findings suggesting  pneumatosis throughout scattered small bowel loops within the mid  abdomen. Fluid throughout the colon suggesting clinical presentation of  diarrhea. Gastrostomy tube is in satisfactory position.     DICTATED:   1/5/2019  EDITED/ls :   1/5/2019      This report was finalized on 1/5/2019 4:53 PM by Dr. Tasha Guzmán MD.               Impression:     --Acute postoperative leukocytosis and low-grade fever, approx , at risk for infection multiple sites including abdomen, lung, urine, IV lines, etc.  IV sites look benign at present and Blood cultures negative so far.  Urinalysis with negative leuk esterase/nitrites and UTI generally less likely at present.  Low lung volumes on chest x-ray not unexpected after abdominal surgery, currently no cough and while he is at risk for pneumonia, unable to confirm at present.  Abdomen remains primary concern with enterocolitis, postoperative fluid and on empiric antibiotics with these concerns in mind with Zyvox/Merrem and micafungin.  At risk for MDR organisms with prior history ESBL and multiple hospitalizations, etc. WBC count trended down some 1/8-1/11. Abd CT 1/10 with fluid collection and  aspiration/drain 1/11 with enterococcus/GNR so far     --Acute postoperative enterocolitis.  Recent surgery as outlined above in the setting of pancreatic cancer,  prior neoadjuvant chemotherapy and tube feeding.  Tube feedings stopped and TPN restarted.  Dr. Brody following closely to determine timing/option/threshold for further surgical intervention or serial imaging/percutaneous drainage etc.  I d/w Dr Brody    --acute postop fluid collection by CT 1/10;  Perc drain/aspirate planned 1/11 per nursing     --Pancreatic cancer with prior neoadjuvant chemotherapy, indwelling chronic port and recent surgery as outlined above.     --COPD     --Diabetes type 2     --Chronic anticoagulation with history A. fib and past antiarrhythmic therapy     --Hx ESBL    PLAN:      --IV Zyvox/Merrem and Mycamine     --Check/review labs cultures and scans     --History per nursing as well     --Discussed with microbiology     --Highly complex set of issues with high risk for further serious morbidity and other serious sequela           Scot Higgins MD  1/14/2019

## 2019-01-14 NOTE — PROGRESS NOTES
"Patient Name:  Todd Phillips  YOB: 1950  [id number]    Surgery Progress Note    Date of visit: 1/14/2019    Subjective   Subjective: Struggled with pain control overnight and required narcotics, improved this AM.  Remains drowsy and has difficulty with mobilization.  Up to chair with lift yesterday.  Tolerating clear liquids.          Objective     Objective:     /87   Pulse 82   Temp 98.6 °F (37 °C) (Bladder)   Resp 18   Ht 182.9 cm (72\")   Wt 100 kg (220 lb 14.4 oz)   SpO2 95%   BMI 29.96 kg/m²     Intake/Output Summary (Last 24 hours) at 1/14/2019 0738  Last data filed at 1/14/2019 0600  Gross per 24 hour   Intake 2889 ml   Output 2600 ml   Net 289 ml     Gen:  Tired appearing but no distress  CV:  Rhythm  regular and rate regular  L:  Slightly coarse to auscultation bilaterally  Abd:  Non distended, soft, mild-moderate tenderness throughout without signs of perironitis   Ext:  No cyanosis, clubbing, edema    Labs that are back at this time have been reviewed.        Assessment/Plan     Assessment/ Plan:      68 man s/p Whipple 12/31/18, post operative course complicated by adult NEC    - continues to make slow progress.  - continue abx per ID, CLD  - aggressive physical therapy and pulmonary toilet as tolerated      Hospital Problem List     * (Principal) Malignant neoplasm of head of pancreas (CMS/[facility])    Overview Signed 9/5/2018  9:58 AM by Olya Vaughn     Added automatically from request for surgery [id number]         Hyperlipidemia (Chronic)        COPD (chronic obstructive pulmonary disease) (CMS/[facility]) (Chronic)        Essential hypertension (Chronic)        Gastroesophageal reflux disease without esophagitis (Chronic)    DM2 (diabetes mellitus, type 2) (CMS/[facility]) (Chronic)        Paroxysmal atrial fibrillation (Chronic)    Chronic anticoagulation, Eliquis (Chronic)    Post-operative confusion and somnolence, likely due to oversedation        Atrial fibrillation with RVR (CMS/[facility]) "    Adult necrotizing enterocolitis (CMS/HCC)    Encephalopathy              Nisha Ackerman MD  1/14/2019  7:38 AM

## 2019-01-14 NOTE — PLAN OF CARE
Problem: Patient Care Overview  Goal: Plan of Care Review  Outcome: Ongoing (interventions implemented as appropriate)      Problem: Sleep Pattern Disturbance (Adult)  Goal: Identify Related Risk Factors and Signs and Symptoms  Outcome: Ongoing (interventions implemented as appropriate)   01/14/19 0315   Sleep Pattern Disturbance (Adult)   Related Risk Factors (Sleep Pattern Disturbance) age extremes;disease related;hospital routine/care;medication effects;noise/lighting/odors;pain/discomfort;privacy limited;sleep partner lack;unfamiliar surroundings   Signs and Symptoms (Sleep Pattern Disturbance) difficulty performing routine tasks     Goal: Adequate Sleep/Rest  Outcome: Ongoing (interventions implemented as appropriate)   01/14/19 0315   Sleep Pattern Disturbance (Adult)   Adequate Sleep/Rest making progress toward outcome       Problem: Physiologic Impairment (Disordered Feeding and Eating Behaviors) (Adult)  Goal: Improved Physiological Symptoms (Disordered Feeding/Eating Behaviors)  Outcome: Ongoing (interventions implemented as appropriate)   01/14/19 0315   Improved Physiological Symptoms (Disordered Feeding/Eating Behaviors)   Improved Physiological Symptoms Action Step/Short Term Goal (STG) Established 01/14/19   Improved Physiological Symptoms Time Frame for Action Step (STG) 1 day   Improved Physiological Symptoms Action Step (STG) Outcome making progress toward outcome

## 2019-01-14 NOTE — PROGRESS NOTES
Pharmacy Parenteral Nutrition Evaluation  Todd Phillips is a  68 y.o. male receiving TPN.     Indication: Necrotizing enterocolitis  Consulting Physician:  Dr Brody    Labs  Results from last 7 days   Lab Units  01/14/19   0430  01/13/19   0352  01/12/19   0421   01/10/19   0521   SODIUM mmol/L  131*  135  130*   < >  129*   POTASSIUM mmol/L  4.1  4.0  4.0   < >  3.7   CHLORIDE mmol/L  101  105  100   < >  101   CO2 mmol/L  25.0  25.0  26.0   < >  23.0   BUN mg/dL  24*  24*  27*   < >  27*   CREATININE mg/dL  0.84  0.88  0.88   < >  0.81   CALCIUM mg/dL  8.0*  8.2*  7.7*   < >  7.9*   BILIRUBIN mg/dL  0.3  0.4   --    --   0.8   ALK PHOS U/L  161*  177*   --    --   223*   ALT (SGPT) U/L  30  41*   --    --   45*   AST (SGOT) U/L  26  41*   --    --   58*   GLUCOSE mg/dL  113*  146*  183*   < >  208*    < > = values in this interval not displayed.     Results from last 7 days   Lab Units  01/14/19 0430 01/13/19 0352 01/12/19 0421   MAGNESIUM mg/dL  2.0  2.0  1.8  2.2   PHOSPHORUS mg/dL  3.9  3.9  3.9  4.0     Results from last 7 days   Lab Units  01/13/19   0352  01/11/19   0416  01/10/19   0521   WBC 10*3/mm3  7.32  13.86*  16.22*   HEMOGLOBIN g/dL  7.6*  7.8*  8.6*   HEMATOCRIT %  23.8*  24.0*  26.4*   PLATELETS 10*3/mm3  389  342  311     Triglycerides   Date Value Ref Range Status   01/14/2019 71 0 - 150 mg/dL Final     estimated creatinine clearance is 100.2 mL/min (by C-G formula based on SCr of 0.84 mg/dL).    Intake & Output (last 3 days)         01/11 0701 - 01/12 0700 01/12 0701 - 01/13 0700 01/13 0701 - 01/14 0700 01/14 0701 - 01/15 0700    P.O.  220 600     I.V. (mL/kg) 441.2 (4.4) 1027.8 (10.3) 1374 (13.7)     Other 30 30 30     NG/ 90      IV Piggyback 819 600  331    TPN 1990.3 2243.1 885     Total Intake(mL/kg) 3400.5 (34) 4210.9 (42.1) 2889 (28.9) 331 (3.3)    Urine (mL/kg/hr) 3150 (1.3) 5150 (2.1) 2050 (0.9) 550 (1.4)    Emesis/NG output  350      Drains 2120 530 550 75    Stool   0      Wound   0     Total Output 5270 6030 2600 625    Net -1869.5 -1819.1 +289 -294            Stool Unmeasured Occurrence  3 x              Dietitian Recommendations  Current TPN Regimen Recommendation:  Dextrose 20% / Amino Acid 7% at goal rate of 60mL/hr.  20% SMOF Lipid Emulsion 200mL every 24 hours.    Assessment/Plan:  1. Continuing TPN .Macronutrients per dietary recommendation as listed above. Goal rate of  60 mL/hr as of 1/13 per RD.  2. Will continue with standard electrolytes and 1:1 acid base. Electrolyte replacements and sliding scale insulin are available.   3. Pharmacy will continue to follow and adjust as indicated.     Thank you,  Yuri Roy, PharmD, BCPS

## 2019-01-15 ENCOUNTER — HOSPITAL ENCOUNTER (OUTPATIENT)
Facility: HOSPITAL | Age: 69
Discharge: ANOTHER HOSPITAL | End: 2019-01-21
Attending: INTERNAL MEDICINE | Admitting: INTERNAL MEDICINE

## 2019-01-15 VITALS
HEART RATE: 96 BPM | OXYGEN SATURATION: 96 % | TEMPERATURE: 98.1 F | RESPIRATION RATE: 20 BRPM | DIASTOLIC BLOOD PRESSURE: 91 MMHG | HEIGHT: 72 IN | WEIGHT: 220.9 LBS | SYSTOLIC BLOOD PRESSURE: 144 MMHG | BODY MASS INDEX: 29.92 KG/M2

## 2019-01-15 PROBLEM — T81.43XA INTRA-ABDOMINAL ABSCESS POST-PROCEDURE: Status: ACTIVE | Noted: 2019-01-15

## 2019-01-15 LAB
ALBUMIN SERPL-MCNC: 2.8 G/DL (ref 3.4–4.8)
ALBUMIN/GLOB SERPL: 0.8 G/DL (ref 1.5–2.5)
ALP SERPL-CCNC: 172 U/L (ref 40–129)
ALT SERPL W P-5'-P-CCNC: 26 U/L (ref 10–44)
ANION GAP SERPL CALCULATED.3IONS-SCNC: 4 MMOL/L (ref 3–11)
ANION GAP SERPL CALCULATED.3IONS-SCNC: 4.9 MMOL/L (ref 3.6–11.2)
AST SERPL-CCNC: 29 U/L (ref 10–34)
BACTERIA FLD CULT: ABNORMAL
BACTERIA FLD CULT: ABNORMAL
BASOPHILS # BLD AUTO: 0.02 10*3/MM3 (ref 0–0.3)
BASOPHILS NFR BLD AUTO: 0.3 % (ref 0–2)
BILIRUB SERPL-MCNC: 0.4 MG/DL (ref 0.2–1.8)
BILIRUB UR QL STRIP: NEGATIVE
BUN BLD-MCNC: 24 MG/DL (ref 9–23)
BUN BLD-MCNC: 25 MG/DL (ref 7–21)
BUN/CREAT SERPL: 26.3 (ref 7–25)
BUN/CREAT SERPL: 27.6 (ref 7–25)
CALCIUM SPEC-SCNC: 8.3 MG/DL (ref 7.7–10)
CALCIUM SPEC-SCNC: 8.4 MG/DL (ref 8.7–10.4)
CHLORIDE SERPL-SCNC: 100 MMOL/L (ref 99–109)
CHLORIDE SERPL-SCNC: 102 MMOL/L (ref 99–112)
CLARITY UR: CLEAR
CO2 SERPL-SCNC: 25 MMOL/L (ref 20–31)
CO2 SERPL-SCNC: 26.1 MMOL/L (ref 24.3–31.9)
COLOR UR: YELLOW
CREAT BLD-MCNC: 0.87 MG/DL (ref 0.6–1.3)
CREAT BLD-MCNC: 0.95 MG/DL (ref 0.43–1.29)
DEPRECATED RDW RBC AUTO: 54.2 FL (ref 37–54)
EOSINOPHIL # BLD AUTO: 0.02 10*3/MM3 (ref 0–0.7)
EOSINOPHIL NFR BLD AUTO: 0.3 % (ref 0–7)
ERYTHROCYTE [DISTWIDTH] IN BLOOD BY AUTOMATED COUNT: 16.9 % (ref 11.5–14.5)
GFR SERPL CREATININE-BSD FRML MDRD: 78 ML/MIN/1.73
GFR SERPL CREATININE-BSD FRML MDRD: 87 ML/MIN/1.73
GLOBULIN UR ELPH-MCNC: 3.6 GM/DL
GLUCOSE BLD-MCNC: 124 MG/DL (ref 70–110)
GLUCOSE BLD-MCNC: 135 MG/DL (ref 70–100)
GLUCOSE BLDC GLUCOMTR-MCNC: 105 MG/DL (ref 70–130)
GLUCOSE BLDC GLUCOMTR-MCNC: 128 MG/DL (ref 70–130)
GLUCOSE BLDC GLUCOMTR-MCNC: 136 MG/DL (ref 70–130)
GLUCOSE UR STRIP-MCNC: NEGATIVE MG/DL
GRAM STN SPEC: ABNORMAL
GRAM STN SPEC: ABNORMAL
HCT VFR BLD AUTO: 25 % (ref 42–52)
HGB BLD-MCNC: 8 G/DL (ref 14–18)
HGB UR QL STRIP.AUTO: NEGATIVE
IMM GRANULOCYTES # BLD AUTO: 0.04 10*3/MM3 (ref 0–0.03)
IMM GRANULOCYTES NFR BLD AUTO: 0.5 % (ref 0–0.5)
KETONES UR QL STRIP: NEGATIVE
LEUKOCYTE ESTERASE UR QL STRIP.AUTO: NEGATIVE
LYMPHOCYTES # BLD AUTO: 1.14 10*3/MM3 (ref 1–3)
LYMPHOCYTES NFR BLD AUTO: 15.2 % (ref 16–46)
MAGNESIUM SERPL-MCNC: 1.9 MG/DL (ref 1.3–2.7)
MCH RBC QN AUTO: 29.4 PG (ref 27–33)
MCHC RBC AUTO-ENTMCNC: 32 G/DL (ref 33–37)
MCV RBC AUTO: 91.9 FL (ref 80–94)
MONOCYTES # BLD AUTO: 0.66 10*3/MM3 (ref 0.1–0.9)
MONOCYTES NFR BLD AUTO: 8.8 % (ref 0–12)
NEUTROPHILS # BLD AUTO: 5.64 10*3/MM3 (ref 1.4–6.5)
NEUTROPHILS NFR BLD AUTO: 74.9 % (ref 40–75)
NITRITE UR QL STRIP: NEGATIVE
OSMOLALITY SERPL CALC.SUM OF ELEC: 272.2 MOSM/KG (ref 273–305)
PH UR STRIP.AUTO: 6.5 [PH] (ref 5–8)
PHOSPHATE SERPL-MCNC: 4.2 MG/DL (ref 2.4–5.1)
PLATELET # BLD AUTO: 464 10*3/MM3 (ref 130–400)
PMV BLD AUTO: 9.2 FL (ref 6–10)
POTASSIUM BLD-SCNC: 4.1 MMOL/L (ref 3.5–5.5)
POTASSIUM BLD-SCNC: 4.4 MMOL/L (ref 3.5–5.3)
PROT SERPL-MCNC: 6.4 G/DL (ref 6–8)
PROT UR QL STRIP: NEGATIVE
RBC # BLD AUTO: 2.72 10*6/MM3 (ref 4.7–6.1)
SODIUM BLD-SCNC: 129 MMOL/L (ref 132–146)
SODIUM BLD-SCNC: 133 MMOL/L (ref 135–153)
SP GR UR STRIP: 1.02 (ref 1–1.03)
UROBILINOGEN UR QL STRIP: NORMAL
WBC NRBC COR # BLD: 7.52 10*3/MM3 (ref 4.5–12.5)

## 2019-01-15 PROCEDURE — 87070 CULTURE OTHR SPECIMN AEROBIC: CPT | Performed by: INTERNAL MEDICINE

## 2019-01-15 PROCEDURE — 82962 GLUCOSE BLOOD TEST: CPT

## 2019-01-15 PROCEDURE — 83735 ASSAY OF MAGNESIUM: CPT

## 2019-01-15 PROCEDURE — 87205 SMEAR GRAM STAIN: CPT | Performed by: INTERNAL MEDICINE

## 2019-01-15 PROCEDURE — 25010000002 LINEZOLID 600 MG/300ML SOLUTION: Performed by: INTERNAL MEDICINE

## 2019-01-15 PROCEDURE — 93010 ELECTROCARDIOGRAM REPORT: CPT | Performed by: INTERNAL MEDICINE

## 2019-01-15 PROCEDURE — 80053 COMPREHEN METABOLIC PANEL: CPT | Performed by: INTERNAL MEDICINE

## 2019-01-15 PROCEDURE — 93005 ELECTROCARDIOGRAM TRACING: CPT | Performed by: INTERNAL MEDICINE

## 2019-01-15 PROCEDURE — 25010000002 MEROPENEM: Performed by: INTERNAL MEDICINE

## 2019-01-15 PROCEDURE — 85025 COMPLETE CBC W/AUTO DIFF WBC: CPT | Performed by: INTERNAL MEDICINE

## 2019-01-15 PROCEDURE — 80048 BASIC METABOLIC PNL TOTAL CA: CPT

## 2019-01-15 PROCEDURE — 81003 URINALYSIS AUTO W/O SCOPE: CPT | Performed by: INTERNAL MEDICINE

## 2019-01-15 PROCEDURE — 84100 ASSAY OF PHOSPHORUS: CPT

## 2019-01-15 PROCEDURE — 87077 CULTURE AEROBIC IDENTIFY: CPT | Performed by: INTERNAL MEDICINE

## 2019-01-15 PROCEDURE — 94799 UNLISTED PULMONARY SVC/PX: CPT

## 2019-01-15 PROCEDURE — 84134 ASSAY OF PREALBUMIN: CPT | Performed by: INTERNAL MEDICINE

## 2019-01-15 PROCEDURE — 25010000002 HYDROMORPHONE PER 4 MG: Performed by: INTERNAL MEDICINE

## 2019-01-15 PROCEDURE — 25010000002 ONDANSETRON PER 1 MG: Performed by: SURGERY

## 2019-01-15 PROCEDURE — 36415 COLL VENOUS BLD VENIPUNCTURE: CPT

## 2019-01-15 PROCEDURE — 87186 SC STD MICRODIL/AGAR DIL: CPT | Performed by: INTERNAL MEDICINE

## 2019-01-15 PROCEDURE — 25010000002 ENOXAPARIN PER 10 MG: Performed by: INTERNAL MEDICINE

## 2019-01-15 RX ORDER — PANTOPRAZOLE SODIUM 40 MG/10ML
40 INJECTION, POWDER, LYOPHILIZED, FOR SOLUTION INTRAVENOUS
Status: ON HOLD
Start: 2019-01-15 | End: 2019-02-01

## 2019-01-15 RX ORDER — LINEZOLID 2 MG/ML
600 INJECTION, SOLUTION INTRAVENOUS EVERY 12 HOURS SCHEDULED
Qty: 5700 ML | Refills: 0 | Status: SHIPPED | OUTPATIENT
Start: 2019-01-15 | End: 2019-02-01 | Stop reason: HOSPADM

## 2019-01-15 RX ADMIN — HYDROCODONE BITARTRATE AND ACETAMINOPHEN 2 TABLET: 5; 325 TABLET ORAL at 11:57

## 2019-01-15 RX ADMIN — METOPROLOL TARTRATE 25 MG: 25 TABLET ORAL at 08:33

## 2019-01-15 RX ADMIN — ONDANSETRON 4 MG: 2 INJECTION INTRAMUSCULAR; INTRAVENOUS at 08:52

## 2019-01-15 RX ADMIN — MEROPENEM 1 G: 1 INJECTION, POWDER, FOR SOLUTION INTRAVENOUS at 08:34

## 2019-01-15 RX ADMIN — HYDROMORPHONE HYDROCHLORIDE 0.25 MG: 1 INJECTION, SOLUTION INTRAMUSCULAR; INTRAVENOUS; SUBCUTANEOUS at 02:13

## 2019-01-15 RX ADMIN — IPRATROPIUM BROMIDE AND ALBUTEROL SULFATE 3 ML: 2.5; .5 SOLUTION RESPIRATORY (INHALATION) at 12:00

## 2019-01-15 RX ADMIN — ENOXAPARIN SODIUM 90 MG: 100 INJECTION SUBCUTANEOUS at 08:33

## 2019-01-15 RX ADMIN — HYDROCODONE BITARTRATE AND ACETAMINOPHEN 2 TABLET: 5; 325 TABLET ORAL at 06:17

## 2019-01-15 RX ADMIN — HYDROMORPHONE HYDROCHLORIDE 0.25 MG: 1 INJECTION, SOLUTION INTRAMUSCULAR; INTRAVENOUS; SUBCUTANEOUS at 08:51

## 2019-01-15 RX ADMIN — BUDESONIDE 0.5 MG: 0.5 INHALANT RESPIRATORY (INHALATION) at 12:00

## 2019-01-15 RX ADMIN — PANTOPRAZOLE SODIUM 40 MG: 40 INJECTION, POWDER, FOR SOLUTION INTRAVENOUS at 06:17

## 2019-01-15 RX ADMIN — FLECAINIDE ACETATE 50 MG: 50 TABLET ORAL at 08:33

## 2019-01-15 RX ADMIN — HEPARIN 100 UNITS: 100 SYRINGE at 12:00

## 2019-01-15 RX ADMIN — LINEZOLID 600 MG: 600 INJECTION, SOLUTION INTRAVENOUS at 08:34

## 2019-01-15 RX ADMIN — SODIUM CHLORIDE, PRESERVATIVE FREE 10 ML: 5 INJECTION INTRAVENOUS at 08:34

## 2019-01-15 NOTE — PROGRESS NOTES
Adult Nutrition  Assessment/PES    Patient Name:  Todd Phillips  YOB: 1950  MRN: [id number]  Admit Date:  12/31/2018    Assessment Date:  1/15/2019    Comments:  PO intake not improving; pt to be transferred to [facility]. PN will need to be reassessed; recently decreased to stimulate po intake. PEG/J in place but his  surgeon, Dr. Brody, does not want pt fed via jejunostomy d/t adult necrotizing enterocolitis.    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 ; SOA, ileus, pneumatosis, portal vein gas     Identified At Risk by Screening Criteria  tube feeding or parenteral nutrition         Nutrition/Diet History     Row Name 01/15/19 1215          Nutrition/Diet History    Typical Food/Fluid Intake  RN reports pt did not eat breakfast,  diet increased to GI soft; he ate very little yesterday < 10 % of meals per report.  Plan to transfer to [facility].  Continues to have significant output from drain, + for VRE and ESBL ecoli     Factors Affecting Nutritional Intake  altered gastrointestinal function           Labs/Tests/Procedures/Meds     Row Name 01/15/19 1218          Labs/Procedures/Meds    Lab Results Reviewed  reviewed, pertinent     Lab Results Comments  improved glucose levels        Diagnostic Tests/Procedures    Diagnostic Test/Procedure Reviewed  reviewed, pertinent     Diagnostic Test/Procedures Comments  per MDR        Medications    Pertinent Medications Reviewed  reviewed, pertinent     Pertinent Medications Comments  per MDR         Physical Findings     Row Name 01/15/19 1224          Physical Findings    Overall Physical Appearance  -- pt and wife  "asleep did not disturb         Estimated/Assessed Needs     Row Name 01/15/19 1225          Calculation Measurements    Weight Used For Calculations  91.5 kg (201 lb 11.5 oz)        Estimated/Assessed Needs    Additional Documentation  KCAL/KG (Group);[place]-St. Jeor Equation (Group)        KCAL/KG    25 Kcal/Kg (kcal)  2287.5     30 Kcal/Kg (kcal)  2745     kcal/kg (Specify)  25        [place]-St. Jeor Equation    RMR ([place]-St. Jeor Equation)  1713 x 1.25 = 2057        Protein Requirements    Weight Used For Protein Calculations  91.5 kg (201 lb 11.5 oz)     Est Protein Requirement Amount (gms/kg)  1.5 gm protein w/ significant output from abscess drainage     Estimated Protein Requirements (gms/day)  137.25        Fluid Requirements    [place]-Cat Method (over 20 kg)  3330         Nutrition Prescription Ordered     Row Name 01/15/19 1226          Nutrition Prescription PO    Current PO Diet  Soft Texture     Texture  Whole foods     Fluid Consistency  Thin     Supplement  Boost Glucose Control (Glucerna Shake)     Supplement Frequency  3 times a day     Common Modifiers  GI Soft/[place]        Nutrition Prescription EN    Enteral Route  PEGJ in place - Dr. Brody ( surgeon)\" has no intention of restarting enteral feedings s/p adult necrotizing enterocolitis\"        Nutrition Prescription PN    PN Route  PICC     PN Goal Rate (mL/hr)  75 mL/hr     PN Goal Volume (mL)  1800 mL     Dextrose Concentration (%)  20 %     Amino Acid Concentration (%)  7 %     Lipid Concentration (%)  20%     Lipid Volume (mL)  200 mL     Lipid Frequency  Daily         Evaluation of Received Nutrient/Fluid Intake     Row Name 01/15/19 1225          Calculation Measurements    Weight Used For Calculations  91.5 kg (201 lb 11.5 oz)         Evaluation of Prescribed Nutrient/Fluid Intake     Row Name 01/15/19 1225          Calculation Measurements    Weight Used For Calculations  91.5 kg (201 lb 11.5 oz)     "         Problem/Interventions:  Problem 1     Row Name 01/15/19 1232          Nutrition Diagnoses Problem 1    Problem 1  Altered GI Function     Etiology (related to)  Medical Diagnosis     Gastrointestinal  Other (comment);Ileus s/p whipple, liver biopsy and portal vein resection/repair; PEG/J placement (12/31); infected ascites (1/11)     Signs/Symptoms (evidenced by)  Report of Mnimal PO Intake     Resolved?  Yes ileus resolved; pt allowed po; PN providing most of pt's nutrition         Problem 2     Row Name 01/15/19 1233          Nutrition Diagnoses Problem 2    Problem 2  Inadequate Intake/Infusion     Inadequate Intake Type  Parenteral     Macronutrient  Kcal;Protein     Etiology (related to)  MNT for Treatment/Condition     Signs/Symptoms (evidenced by)  PN Intake Delivery     Percent (%) of  PN goal  95 %               Intervention Goal     Row Name 01/15/19 1233          Intervention Goal    General  Meet nutritional needs for age/condition     PO  Establish PO;Tolerate PO;Increase intake     TF/PN  Adjust TF/PN         Nutrition Intervention     Row Name 01/15/19 1234          Nutrition Intervention    RD/Tech Action  Follow Tx progress;Care plan reviewd;Encourage intake;Adjusted supplement     Recommended/Ordered  PN;Supplement         Nutrition Prescription     Row Name 01/15/19 1234          Nutrition Prescription PO    PO Prescription  Discontinue supplement     Supplement  Boost Breeze     Supplement Frequency  3 times a day     New PO Prescription Ordered?  Yes RN reports pt not taking supplement; also has considerable fiber content        Other Orders    Other  PN solution and infusion rate will need to be reassessed by RD at [facility]; pt has had poor intake reported at < 10 % yesterday and none today; rate reduced to stimulate appetie but this did not help.         Education/Evaluation     Row Name 01/15/19 1237          Monitor/Evaluation    Monitor  Per protocol;I&O;Pertinent labs;TF  delivery/tolerance;Symptoms;PO intake           Electronically signed by:  Aster Coleman MS,RD,LD  01/15/19 12:38 PM

## 2019-01-15 NOTE — PLAN OF CARE
Problem: Patient Care Overview  Goal: Plan of Care Review  Outcome: Ongoing (interventions implemented as appropriate)      Problem: Nutritional Intake Impairment (Disordered Feeding and Eating Behaviors) (Adult)  Goal: Optimized Nutritional Intake (Disordered Feeding/Eating Behaviors)  Outcome: Ongoing (interventions implemented as appropriate)   01/15/19 0520   Optimized Nutritional Intake (Disordered Feeding/Eating Behaviors)   Optimized Nutritional Intake Time Frame for Action Step (STG) 1 day   Optimized Nutritional Intake Action Step (STG) Outcome making progress toward outcome       Problem: Family Process Interrupt/Ready Enhance (Adult,NICU,[place],Obstetrics,Pediatric)  Goal: Identify Related Risk Factors and Signs and Symptoms  Outcome: Outcome(s) achieved Date Met: 01/15/19   01/15/19 05   Family Process Interrupt/Ready Enhance (Adult,NICU,[place],Obstetrics,Pediatric)   Related Risk Factors (Family Process Interrupt/Enhance) family coping styles;family disorganization;knowledge deficit;power shift in family;psychosocial factor   Signs and Symptoms (Family Process Interrupt/Enhance) communication patterns change;role/task change     Goal: Effective Family Functioning/Relationships  Outcome: Ongoing (interventions implemented as appropriate)   01/15/19 0520   Family Process Interrupt/Ready Enhance (Adult,NICU,,Obstetrics,Pediatric)   Effective Family Functioning/Relationships making progress toward outcome

## 2019-01-15 NOTE — PROGRESS NOTES
[facility] Progress Note        Antibiotics:  Anti-Infectives (From admission, onward)    Ordered     Dose/Rate Route Frequency Start Stop    01/07/19 0942  Linezolid (ZYVOX) 600 mg 300 mL     Scot Higgins MD reviewed the order on 01/15/19 0716.   Ordering Provider:  Scot Higgins MD    600 mg  300 mL/hr over 60 Minutes Intravenous Every 12 Hours Scheduled 01/07/19 1100 01/25/19 0715    01/06/19 1306  meropenem (MERREM) 1 g/100 mL 0.9% NS VTB (mbp)     Scot Higgins MD reviewed the order on 01/15/19 0716.   Ordering Provider:  Scot Higgins MD    1 g  over 3 Hours Intravenous Every 8 Hours 01/06/19 1600 01/27/19 2359    01/05/19 1820  micafungin 100 mg/100 mL 0.9% NS IVPB (mbp)     Scot Higgins MD reviewed the order on 01/15/19 0716.   Ordering Provider:  Scot Higgins MD    100 mg  over 60 Minutes Intravenous Every 24 Hours 01/05/19 2000 01/22/19 0715    01/05/19 1859  meropenem (MERREM) 1 g/100 mL 0.9% NS VTB (mbp)     Ordering Provider:  Levi Bustos, RPH    1 g  over 30 Minutes Intravenous Once 01/05/19 1945 01/05/19 2039    01/05/19 1859  vancomycin 2250 mg/500 mL 0.9% NS IVPB ([facility])     Ordering Provider:  Levi Bustos RPH    2,250 mg Intravenous Once 01/05/19 1945 01/05/19 2009 01/01/19 0936  piperacillin-tazobactam (ZOSYN) 3.375 g in iso-osmotic dextrose 50 ml (premix)     Ordering Provider:  Miquel Brody MD    3.375 g  over 30 Minutes Intravenous Once 01/01/19 1030 01/01/19 1209    12/31/18 0632  ceFAZolin in dextrose (ANCEF) IVPB solution 2 g     Ordering Provider:  Miquel Brody MD    2 g  over 30 Minutes Intravenous Once 12/31/18 0634 12/31/18 0800    12/31/18 0632  metroNIDAZOLE (FLAGYL) IVPB 500 mg     Ordering Provider:  Miquel Brody MD    500 mg  over 60 Minutes Intravenous Once 12/31/18 0634 12/31/18 0830          CC: fatigue, abd pain    HPI:    Patient is a 68 y.o.  Yr old male with history of biliary tract  obstruction, admitted to [facility] multiple times in the last 2 months including August 10 until August 16, underwent cholecystectomy with pathology suggesting chronic cholecystitis, had ERCP on August 14 with biliary stricture/proximal duct dilation and had stent placement.  He was readmitted August 21, 2018 until August 25, 2018 with acute sepsis, Klebsiella bacteremia, discharged with oral antibiotics.  Readmitted September 12, 2018 until September 17, 2018 with ESBL Escherichia coli bacteremia seen by infectious disease in [place] and treated with Invanz, duration of therapy unclear but approximately until September 24.  During that period of time, concern for malignancy mentioned in notes and referred to [facility] for further biopsy, done September 28.  Presented once again to [facility] emergency room September 30, 2018 with acute onset abdominal pain/nausea/vomiting.  Blood cultures positive again with  Klebsiella species and  Transferred to [facility]. 10/1/18 ERCP with evidence for obstruction/purulence and new stent placed; He was treated with rocephin/flagyl and last seen by us early October; after that, he had more definitive diagnosis of pancreatic cancer and treated with neoadjuvant chemotherapy and right chest port placement.  In addition he required TPN.  Family not aware of any other specific microbiologic diagnosis since October; he was admitted December 31 and underwent pancreaticoduodenectomy, wedge biopsy of liver tumor and portal vein resection/lateral repair with gastrojejunostomy tube placement.  Moved to ICU on January 5 with concern regarding increased confusion/Tachypnea, abnormal CT scan with pneumatosis/ileus and portal venous gas in addition to intra-abdominal fluid per description of radiology.  Chest x-ray January 6 with low lung volumes and increased markings at lung bases bilaterally but no focal parenchymal opacification per  "radiology.  Head CT no acute intracranial abnormality.    1/11 perc drain with 1.8 liters fluid removed per radiology     1/15/19 seen early and sleepy;  Patient  sleepy/Confused and does not cooperate with a history or review of systems.  He answers yes/no to some simple questions but unclear reliability.  Nursing reports left arm PICC line placed January 6 and remains stable, chronic right port in the chest with no new redness/swelling or change there.   No rash.  On nasal cannula oxygen with no productive cough. LG fever at 99 last 24 hours     Patient does not cooperate with ROS otherwise      ROS:        1/15/19 as above, patient confused and not cooperative with review of systems          PE:   /83 (BP Location: Right arm, Patient Position: Lying)   Pulse 85   Temp 98.4 °F (36.9 °C) (Bladder)   Resp 18   Ht 182.9 cm (72\")   Wt 100 kg (220 lb 14.4 oz)   SpO2 95%   BMI 29.96 kg/m²       GENERAL: sleepy.  RA  HEENT: Normocephalic, atraumatic.  PERRL. EOMI. No conjunctival injection. No icterus. Oropharynx clear without evidence of thrush or exudate. No evidence of peridontal disease.    NECK: Supple without nuchal rigidity. No mass.  LYMPH: No cervical, axillary or inguinal lymphadenopathy.  HEART: RRR; No murmur, rubs, gallops.   LUNGS: Diminished at bases bilaterally without wheezing, rales, rhonchi. Normal respiratory effort. Nonlabored. No dullness.  ABDOMEN: Soft, tender and distended. Positive bowel sounds managed. No rebound or guarding. NO mass or HSM.  Ostomy bag noted at right abdomen but no active drainage or fluid in bag.  Surgical site noted with no redness induration or warmth.  No mass bulge or fluctuance.  No crepitus or bulla.  Feeding tube site noted with no redness induration or warmth.  No mass bulge or fluctuance appreciated.  No crepitus or bulla.  No purulence or skin necrosis  EXT:  No cyanosis, clubbing. No cord.  : Genitalia generally unremarkable.  With Hassan " catheter.  MSK: FROM without joint effusions noted arms/legs.    SKIN: Warm and dry without cutaneous eruptions on Inspection/palpation.    NEURO: sleepy     Right chest port with no redness induration or warmth.  No crepitus or bulla.  No purulence     Left arm PICC line with no redness or purulence     No peripheral stigmata/phenomena of endocarditis        Laboratory Data    Results from last 7 days   Lab Units  01/13/19   0352  01/11/19   0416  01/10/19   0521   WBC 10*3/mm3  7.32  13.86*  16.22*   HEMOGLOBIN g/dL  7.6*  7.8*  8.6*   HEMATOCRIT %  23.8*  24.0*  26.4*   PLATELETS 10*3/mm3  389  342  311     Results from last 7 days   Lab Units  01/14/19   0430   SODIUM mmol/L  131*   POTASSIUM mmol/L  4.1   CHLORIDE mmol/L  101   CO2 mmol/L  25.0   BUN mg/dL  24*   CREATININE mg/dL  0.84   GLUCOSE mg/dL  113*   CALCIUM mg/dL  8.0*     Results from last 7 days   Lab Units  01/14/19   0430   ALK PHOS U/L  161*   BILIRUBIN mg/dL  0.3   ALT (SGPT) U/L  30   AST (SGOT) U/L  26               Estimated Creatinine Clearance: 100.2 mL/min (by C-G formula based on SCr of 0.84 mg/dL).      Microbiology:      Radiology:  Imaging Results (last 72 hours)     Procedure Component Value Units Date/Time    XR Chest 1 View [[id number]] Collected:  01/06/19 0915     Updated:  01/07/19 1411    Narrative:          EXAMINATION: XR CHEST 1 VW - 1/6/2019     INDICATION: Z01.89-Encounter for other specified special examinations;  C25.9-Malignant neoplasm of pancreas, unspecified; Z74.09-Other reduced  mobility      COMPARISON: 01/05/2019     FINDINGS: Portable chest reveals low lung volumes. Deep line catheter on  the right with tip in the SVC. Mild increased markings at lung bases  bilaterally with degenerative changes seen within the spine.  No focal  parenchymal opacification present.       Impression:       Low lung volumes with increased markings at the lung bases  bilaterally.     DICTATED:   1/6/2019  EDITED/ls :   1/6/2019       This report was finalized on 1/7/2019 2:09 PM by Dr. Tasha Guzmán MD.       CT Head Without Contrast [[id number]] Collected:  01/06/19 1803     Updated:  01/07/19 1410    Narrative:       EXAMINATION: CT HEAD WO CONTRAST - 1/6/2019     INDICATION:  Z01.89-Encounter for other specified special examinations;  C25.9-Malignant neoplasm of pancreas, unspecified; Z74.09-Other reduced  mobility. Mental status change.     TECHNIQUE: Multiple axial CT imaging is obtained of the head without the  administration of intravenous contrast.     The radiation dose reduction device was turned on for each scan per the  ALARA (As Low as Reasonably Achievable) protocol.     COMPARISON: 11/17/2018     FINDINGS: Atrophy of the brain with low-density area seen in the  periventricular and subcortical white matter suggesting chronic small  vessel ischemic change. No hemorrhage or hydrocephalus. No mass, mass  effect, or midline shift. No abnormal extra-axial fluid collections  identified. The bony structures reveal no evidence of osseous  abnormality. The visualized paranasal sinuses are clear. The mastoid air  cells are patent.       Impression:       Chronic changes seen within the brain with no acute  intracranial abnormality identified.     DICTATED:   1/6/2019  EDITED/ls :   1/6/2019         This report was finalized on 1/7/2019 2:08 PM by Dr. Tasha Guzmán MD.       XR Chest 1 View [[id number]] Collected:  01/05/19 1928     Updated:  01/06/19 1008    Narrative:          EXAMINATION: XR CHEST 1 VW - 1/5/2019     INDICATION:  Z01.89-Encounter for other specified special examinations;  C25.9-Malignant neoplasm of pancreas, unspecified; Z74.09-Other reduced  mobility      COMPARISON: 01/05/2019     FINDINGS: Portable chest reveals deep line catheter on the right with  tip in the SVC. Mild increased markings at left lung base with small  left pleural effusion. Improvement seen in aeration of the lung bases in  the interval.  Degenerative change is seen within the spine. Pulmonary  vascularity is within normal limits.           Impression:       Improvement seen in aeration of the lung bases in the  interval with only minimal right residual markings at the lung bases.     DICTATED:   1/5/2019  EDITED/ls :   1/5/2019      This report was finalized on 1/6/2019 10:06 AM by Dr. Tasha Guzmán MD.       CT Abdomen Pelvis Without Contrast [[id number]] Collected:  01/05/19 1645     Updated:  01/05/19 1655    Narrative:       EXAMINATION: CT ABDOMEN/PELVIS WO CONTRAST - 1/5/2019      INDICATION:  Z01.89-Encounter for other specified special examinations;  C25.9-Malignant neoplasm of pancreas, unspecified; Z74.09-Other reduced  mobility. Abdominal pain.     TECHNIQUE: Multiple axial CT imaging is obtained of the abdomen and  pelvis without the administration of oral or intravenous contrast.     The radiation dose reduction device was turned on for each scan per the  ALARA (As Low as Reasonably Achievable) protocol.     COMPARISON: NONE     FINDINGS: There are small bilateral pleural effusions. Atelectatic  change is seen in the lung bases bilaterally. Extraluminal air is  identified throughout the upper abdomen. There is fluid seen surrounding  the spleen as well as the liver. There is a small to  moderate size  hiatal hernia. There is gastric distention. There are postoperative  changes seen from Whipple procedure. There is a small collection of  fluid identified along the leftward aspect of the liver. There is some  free fluid identified near the sarbjit hepatis. There is a G-tube  identified in satisfactory position. Kidneys and adrenal glands within  normal limits. There is gaseous distention of the small bowel loops  suggesting a postoperative ileus. There is air identified within the  mesenteric vessels. Findings are concerning for pneumatosis of several  small bowel loops centrally within the abdomen. There is fluid seen  scattered  throughout the colon. Mild distention of the colon. There is  some fluid seen within the pelvis.     Pelvis: The pelvic portion of the gastrointestinal tract is within  normal limits. Fluid seen within the colon. The bladder reveals  air-fluid level likely from prior instrumentation. No pelvic adenopathy.  Bony structures reveal no evidence of osseous abnormality.       Impression:       There are small bilateral pleural effusions. There is a  fluid collection seen surrounding the liver and spleen containing air in  the fluid collection around the liver. There is air seen scattered  throughout the abdomen which may be postoperative. There is air  identified within the mesenteric vessels with findings suggesting  pneumatosis throughout scattered small bowel loops within the mid  abdomen. Fluid throughout the colon suggesting clinical presentation of  diarrhea. Gastrostomy tube is in satisfactory position.     DICTATED:   1/5/2019  EDITED/ls :   1/5/2019      This report was finalized on 1/5/2019 4:53 PM by Dr. Tasha Guzmán MD.               Impression:     --Acute postoperative leukocytosis and low-grade fever, approx , at risk for infection multiple sites including abdomen, lung, urine, IV lines, etc.  IV sites look benign at present and Blood cultures negative so far.  Urinalysis with negative leuk esterase/nitrites and UTI generally less likely at present.  Low lung volumes on chest x-ray not unexpected after abdominal surgery, currently no cough and while he is at risk for pneumonia, unable to confirm at present.  Abdomen remains primary concern with enterocolitis, postoperative IA abscess and on empiric antibiotics with these concerns in mind with Zyvox/Merrem and micafungin.  Abd CT 1/10 with fluid collection and  aspiration/drain 1/11 with VRE/ESBL E Coli so far     --Acute postoperative enterocolitis.  Recent surgery as outlined above in the setting of pancreatic cancer, prior neoadjuvant  chemotherapy and tube feeding.  Tube feedings stopped and TPN restarted.  Dr. Brody following closely to determine timing/option/threshold for further surgical intervention or serial imaging/percutaneous drainage etc.  I d/w Dr Brody    --acute postop fluid collection by CT 1/10;  Perc drain/aspirate planned 1/11 per nursing     --Pancreatic cancer with prior neoadjuvant chemotherapy, indwelling chronic port and recent surgery as outlined above.     --COPD     --Diabetes type 2     --Chronic anticoagulation with history A. fib and past antiarrhythmic therapy     --Hx ESBL    PLAN:      --IV Zyvox/Merrem and Mycamine     --Check/review labs cultures and scans     --History per nursing as well     --Discussed with microbiology     --Highly complex set of issues with high risk for further serious morbidity and other serious sequela    --possible LTACH in [place] soon;  IF so, all care to be assumed by physicians there           Scot Higgins MD  1/15/2019

## 2019-01-15 NOTE — DISCHARGE SUMMARY
DISCHARGE SUMMARY     Admit date: 12/31/2018  Date of Discharge:  1/15/2019    Discharge Diagnoses  Active Hospital Problems    Diagnosis   • **Malignant neoplasm of head of pancreas (CMS/HCC)     Added automatically from request for surgery [id number]     • Intra-abdominal abscess (S/P Percutaneous drain)   • Adult necrotizing enterocolitis (CMS/HCC)   • Post-operative confusion and somnolence, likely due to oversedation   • Chronic anticoagulation, Eliquis   • Encephalopathy   • Atrial fibrillation with RVR (CMS/HCC)   • Paroxysmal atrial fibrillation   • DM2 (diabetes mellitus, type 2) (CMS/HCC)   • Gastroesophageal reflux disease without esophagitis   • COPD (chronic obstructive pulmonary disease) (CMS/HCC)   • Hyperlipidemia   • Essential hypertension       Past Surgical History:   Procedure Laterality Date   • BILE DUCT STENT PLACEMENT      [facility] ago    • CARDIAC CATHETERIZATION  11/01/1999   • CARDIOVASCULAR STRESS TEST  09/2012   • CHOLECYSTECTOMY N/A 8/10/2018    Procedure: CHOLECYSTECTOMY LAPAROSCOPIC;  Surgeon: Ronak Downs MD;  Location: [facility] OR;  Service: General   • COLONOSCOPY     • CYSTOSCOPY BLADDER STONE LITHOTRIPSY     • ECHO - CONVERTED  09/2012   • ERCP N/A 8/14/2018    Procedure: ENDOSCOPIC RETROGRADE CHOLANGIOPANCREATOGRAPHY WITH PAPILLOTOMY;  Surgeon: Scot Su MD;  Location: [facility] OR;  Service: Gastroenterology   • ERCP N/A 10/1/2018    Procedure: ENDOSCOPIC RETROGRADE CHOLANGIOPANCREATOGRAPHY;  Surgeon: Jefry Luna MD;  Location:  [facility];  Service: Gastroenterology   • KIDNEY STONE SURGERY     • KIDNEY STONE SURGERY      open abdominal surgery   • PORTACATH PLACEMENT Right 10/23/2018    Procedure: INSERTION OF PORTACATH;  Surgeon: Ronak Downs MD;  Location: [facility] OR;  Service: General   • TONSILLECTOMY     • UPPER GASTROINTESTINAL ENDOSCOPY  08/30/2012   • WHIPPLE PROCEDURE N/A 12/31/2018    Procedure: WHIPPLE  PROCEDURE, BIOPSY OF LIVER, PORTAL VEIN RESECTION AND REPAIR, G/J TUBE PLACEMENT;  Surgeon: Miquel Brody MD;  Location: [facility];  Service: General       History of Present Illness    Patient is a 68 y.o. male presented with: Malignant neoplasm of head of pancreas (CMS/HCC)    Patient is a 68 y.o.  Yr old male with history of biliary tract obstruction, admitted to [facility] multiple times in the last 2 months including August 10 until August 16, underwent cholecystectomy with pathology suggesting chronic cholecystitis, had ERCP on August 14 with biliary stricture/proximal duct dilation and had stent placement.    He was readmitted August 21, 2018 until August 25, 2018 with acute sepsis, Klebsiella bacteremia, discharged with oral antibiotics.  Readmitted September 12, 2018 until September 17, 2018 with ESBL Escherichia coli bacteremia seen by infectious disease in [place] and treated with Invanz, duration of therapy unclear but approximately until September 24.    During that period of time, concern for malignancy mentioned in notes and referred to [facility] for further biopsy, done September 28.  Presented once again to [facility] emergency room September 30, 2018 with acute onset abdominal pain/nausea/vomiting.  Blood cultures positive again with  Klebsiella species and  Transferred to [facility]. 10/1/18 ERCP with evidence for obstruction/purulence and new stent placed; He was treated with rocephin/flagyl and last seen by us early October; after that, he had more definitive diagnosis of pancreatic cancer and treated with neoadjuvant chemotherapy and right chest port placement.  In addition he required TPN.    He was admitted December 31 and underwent pancreaticoduodenectomy, wedge biopsy of liver tumor and portal vein resection/lateral repair with gastrojejunostomy tube placement.    Hospital Course    Post operatively he was initially managed on the  floor.    On January 5 he was moved to the ICU with concern regarding increased confusion/Tachypnea, abnormal CT scan with pneumatosis/ileus and portal venous gas in addition to intra-abdominal fluid per description of radiology.    Head CT no acute intracranial abnormality.    1/11 perc drain with 1.8 liters fluid removed per radiology     PICC line placed January 6    The patient had an unremarkable neurologic workup and his encephalopathy cleared throughout his hospital stay.    He received TPN for nutritional support throughout his hospital stay.  He was started on a diet as listed below but his intake was low and therefore he remained on TPN supplementation.    Antibiotics as prescribed by [facility] infectious disease.    Per Dr. Brody:    1) Abdominal staples can be removed today  2) Medications ONLY through g-tube  3) NOTHING per [facility]  4) Can retrial G-Tube feeding in one week if needed    Procedures Performed: Procedure(s):  WHIPPLE PROCEDURE, BIOPSY OF LIVER, PORTAL VEIN RESECTION AND REPAIR, G/J TUBE PLACEMENT     Consults: Intensivist, [facility] (Dr. Higgins)    Pertinent Test Results:   Results from last 7 days   Lab Units  01/13/19   0352   WBC 10*3/mm3  7.32   HEMOGLOBIN g/dL  7.6*   HEMATOCRIT %  23.8*   PLATELETS 10*3/mm3  389     Results from last 7 days   Lab Units  01/15/19   0827  01/14/19   0430  01/13/19   0352   SODIUM mmol/L  129*  131*  135   POTASSIUM mmol/L  4.1  4.1  4.0   CHLORIDE mmol/L  100  101  105   CO2 mmol/L  25.0  25.0  25.0   BUN mg/dL  24*  24*  24*   CREATININE mg/dL  0.87  0.84  0.88   GLUCOSE mg/dL  135*  113*  146*   CALCIUM mg/dL  8.4*  8.0*  8.2*   PHOSPHORUS mg/dL  4.2  3.9  3.9  3.9         Condition on Discharge:  Improved/stable    Discharge Disposition: Short Term Hospital (DC - External)       Discharge Medications           Instructions Start Date   apixaban 5 MG tablet tablet     One pill Every 12 Hours Scheduled      Linezolid 600  MG/300ML IVPB  Commonly known as:  ZYVOX   600 mg, Intravenous, Every 12 Hours Scheduled      meropenem  Commonly known as:  MERREM   1 g, Intravenous, Every 8 Hours      pantoprazole 40 MG injection  Commonly known as:  PROTONIX   40 mg, Intravenous, Every Morning Before Breakfast         LORazepam 0.5 MG tablet  Commonly known as:  ATIVAN  What changed:    · how much to take  · how to take this  · when to take this  · reasons to take this  · additional instructions   Take one to two tablets by mouth every 4 to 6 hours for nausea.         albuterol (2.5 MG/3ML) 0.083% nebulizer solution  Commonly known as:  PROVENTIL   2.5 mg, Nebulization, 2 Times Daily PRN      albuterol sulfate  (90 Base) MCG/ACT inhaler  Commonly known as:  PROVENTIL HFA;VENTOLIN HFA;PROAIR HFA   2 puffs, Inhalation, Every 4 Hours PRN      allopurinol 100 MG tablet  Commonly known as:  ZYLOPRIM   100 mg, Oral, Daily      cholecalciferol 1000 units tablet  Commonly known as:  VITAMIN D3   1,000 Units, Oral, Daily      colchicine 0.6 MG tablet   0.6 mg, Oral, Daily      dronabinol 5 MG capsule  Commonly known as:  MARINOL   5 mg, Oral, 2 Times Daily Before Meals      flecainide 50 MG tablet  Commonly known as:  TAMBOCOR   50 mg, Oral, Every 12 Hours Scheduled      FLUoxetine 20 MG capsule  Commonly known as:  PROzac   20 mg, Oral, 2 Times Daily      insulin lispro 100 UNIT/ML injection  Commonly known as:  humaLOG   Subcutaneous, 3 Times Daily Before Meals      linaclotide 145 MCG capsule capsule  Commonly known as:  LINZESS   145 mcg, Oral, Every Morning Before Breakfast      mometasone 220 MCG/INH inhaler  Commonly known as:  ASMANEX   2 puffs, Inhalation, Nightly      montelukast 10 MG tablet  Commonly known as:  SINGULAIR   10 mg, Oral, Daily      Morphine 30 MG 12 hr tablet  Commonly known as:  MS CONTIN   30 mg, Oral, Every 12 Hours      nitroglycerin 0.4 MG SL tablet  Commonly known as:  NITROSTAT   0.4 mg, Sublingual, As Needed       ondansetron 8 MG tablet  Commonly known as:  ZOFRAN   8 mg, Oral, Every 8 Hours PRN      oxyCODONE 5 MG immediate release tablet  Commonly known as:  ROXICODONE   5 mg, Oral, Every 4 Hours PRN      polyethylene glycol packet  Commonly known as:  MIRALAX   17 g, Oral, Daily PRN      promethazine 12.5 MG tablet  Commonly known as:  PHENERGAN   12.5 mg, Oral, Every 6 Hours PRN      tiotropium bromide-olodaterol 2.5-2.5 MCG/ACT aerosol solution inhaler  Commonly known as:  STIOLTO RESPIMAT   2 puffs, Inhalation, Daily             Discharge Diet: Adult Central 2-in-1 TPN  Diet Soft Texture; [employer]; GI Soft / [place]    Activity at Discharge: ad anthony    Follow-up Appointments  Future Appointments   Date Time Provider Department Center   1/30/2019  1:30 PM Gwen Alves MD [facility]   2/5/2019  2:30 PM Jefry Luna MD [facility] None   3/26/2019 10:40 AM Adiel Denney MD [facility] None   6/21/2019 10:15 AM Rj Perez MD [facility] None         Test Results Pending at Discharge   Order Current Status    Blood Gas, Arterial Collected (12/31/18 0908)    Bilirubin, Body Fluid - Body Fluid, Peritoneum In process    Fungus Culture - Body Fluid, Peritoneum In process    Anaerobic Culture - Body Fluid, Peritoneum Preliminary result           Code Status and Medical Interventions:   Ordered at: 12/31/18 1604     Level Of Support Discussed With:    Patient     Code Status:    CPR     Medical Interventions (Level of Support Prior to Arrest):    Full       Nayan Cabrera MD  01/15/19  1:25 PM    Discharge Time Spent: 45 Minutes

## 2019-01-16 ENCOUNTER — APPOINTMENT (OUTPATIENT)
Dept: GENERAL RADIOLOGY | Facility: HOSPITAL | Age: 69
End: 2019-01-16

## 2019-01-16 LAB
ALBUMIN SERPL-MCNC: 2.5 G/DL (ref 3.4–4.8)
ALBUMIN/GLOB SERPL: 0.8 G/DL (ref 1.5–2.5)
ALP SERPL-CCNC: 162 U/L (ref 40–129)
ALT SERPL W P-5'-P-CCNC: 25 U/L (ref 10–44)
ANION GAP SERPL CALCULATED.3IONS-SCNC: 6.7 MMOL/L (ref 3.6–11.2)
AST SERPL-CCNC: 27 U/L (ref 10–34)
BILIRUB SERPL-MCNC: 0.2 MG/DL
BILIRUB SERPL-MCNC: 0.3 MG/DL (ref 0.2–1.8)
BUN BLD-MCNC: 23 MG/DL (ref 7–21)
BUN/CREAT SERPL: 26.1 (ref 7–25)
CALCIUM SPEC-SCNC: 8 MG/DL (ref 7.7–10)
CHLORIDE SERPL-SCNC: 101 MMOL/L (ref 99–112)
CHOLEST SERPL-MCNC: 53 MG/DL (ref 0–200)
CO2 SERPL-SCNC: 23.3 MMOL/L (ref 24.3–31.9)
CREAT BLD-MCNC: 0.88 MG/DL (ref 0.43–1.29)
DEPRECATED RDW RBC AUTO: 54.1 FL (ref 37–54)
ERYTHROCYTE [DISTWIDTH] IN BLOOD BY AUTOMATED COUNT: 16.8 % (ref 11.5–14.5)
GFR SERPL CREATININE-BSD FRML MDRD: 86 ML/MIN/1.73
GLOBULIN UR ELPH-MCNC: 3.3 GM/DL
GLUCOSE BLD-MCNC: 132 MG/DL (ref 70–110)
GLUCOSE BLDC GLUCOMTR-MCNC: 154 MG/DL (ref 70–130)
HCT VFR BLD AUTO: 22.9 % (ref 42–52)
HDLC SERPL-MCNC: 20 MG/DL (ref 60–100)
HGB BLD-MCNC: 7.1 G/DL (ref 14–18)
LDLC SERPL CALC-MCNC: 16 MG/DL (ref 0–100)
LDLC/HDLC SERPL: 0.81 {RATIO}
MCH RBC QN AUTO: 28.5 PG (ref 27–33)
MCHC RBC AUTO-ENTMCNC: 31 G/DL (ref 33–37)
MCV RBC AUTO: 92 FL (ref 80–94)
OSMOLALITY SERPL CALC.SUM OF ELEC: 268.2 MOSM/KG (ref 273–305)
PLATELET # BLD AUTO: 381 10*3/MM3 (ref 130–400)
PMV BLD AUTO: 8.9 FL (ref 6–10)
POTASSIUM BLD-SCNC: 4.1 MMOL/L (ref 3.5–5.3)
PROT SERPL-MCNC: 5.8 G/DL (ref 6–8)
RBC # BLD AUTO: 2.49 10*6/MM3 (ref 4.7–6.1)
SODIUM BLD-SCNC: 131 MMOL/L (ref 135–153)
TRIGL SERPL-MCNC: 84 MG/DL (ref 0–150)
VLDLC SERPL-MCNC: 16.8 MG/DL
WBC NRBC COR # BLD: 6.75 10*3/MM3 (ref 4.5–12.5)

## 2019-01-16 PROCEDURE — 97116 GAIT TRAINING THERAPY: CPT

## 2019-01-16 PROCEDURE — 82962 GLUCOSE BLOOD TEST: CPT

## 2019-01-16 PROCEDURE — 97167 OT EVAL HIGH COMPLEX 60 MIN: CPT

## 2019-01-16 PROCEDURE — 71045 X-RAY EXAM CHEST 1 VIEW: CPT

## 2019-01-16 PROCEDURE — 71045 X-RAY EXAM CHEST 1 VIEW: CPT | Performed by: RADIOLOGY

## 2019-01-16 PROCEDURE — 80061 LIPID PANEL: CPT | Performed by: INTERNAL MEDICINE

## 2019-01-16 PROCEDURE — 97163 PT EVAL HIGH COMPLEX 45 MIN: CPT

## 2019-01-16 PROCEDURE — 85027 COMPLETE CBC AUTOMATED: CPT | Performed by: INTERNAL MEDICINE

## 2019-01-16 PROCEDURE — 80053 COMPREHEN METABOLIC PANEL: CPT | Performed by: INTERNAL MEDICINE

## 2019-01-17 LAB
ALBUMIN SERPL-MCNC: 2.7 G/DL (ref 3.4–4.8)
ALBUMIN/GLOB SERPL: 0.8 G/DL (ref 1.5–2.5)
ALP SERPL-CCNC: 154 U/L (ref 40–129)
ALT SERPL W P-5'-P-CCNC: 24 U/L (ref 10–44)
ANION GAP SERPL CALCULATED.3IONS-SCNC: 6.9 MMOL/L (ref 3.6–11.2)
AST SERPL-CCNC: 25 U/L (ref 10–34)
BACTERIA SPEC AEROBE CULT: NO GROWTH
BASOPHILS # BLD AUTO: 0.01 10*3/MM3 (ref 0–0.3)
BASOPHILS NFR BLD AUTO: 0.2 % (ref 0–2)
BILIRUB SERPL-MCNC: 0.3 MG/DL (ref 0.2–1.8)
BNP SERPL-MCNC: 47 PG/ML (ref 0–100)
BUN BLD-MCNC: 19 MG/DL (ref 7–21)
BUN/CREAT SERPL: 20.2 (ref 7–25)
CALCIUM SPEC-SCNC: 8.2 MG/DL (ref 7.7–10)
CHLORIDE SERPL-SCNC: 100 MMOL/L (ref 99–112)
CK MB SERPL-CCNC: 0.54 NG/ML (ref 0–5)
CK MB SERPL-RTO: 4.2 % (ref 0–3)
CK SERPL-CCNC: 13 U/L (ref 24–204)
CO2 SERPL-SCNC: 23.1 MMOL/L (ref 24.3–31.9)
CREAT BLD-MCNC: 0.94 MG/DL (ref 0.43–1.29)
CRP SERPL-MCNC: 4.94 MG/DL (ref 0–0.99)
CRP SERPL-MCNC: 5.01 MG/DL (ref 0–0.99)
DEPRECATED RDW RBC AUTO: 55.8 FL (ref 37–54)
EOSINOPHIL # BLD AUTO: 0.07 10*3/MM3 (ref 0–0.7)
EOSINOPHIL NFR BLD AUTO: 1.2 % (ref 0–7)
ERYTHROCYTE [DISTWIDTH] IN BLOOD BY AUTOMATED COUNT: 17.2 % (ref 11.5–14.5)
GFR SERPL CREATININE-BSD FRML MDRD: 79 ML/MIN/1.73
GLOBULIN UR ELPH-MCNC: 3.4 GM/DL
GLUCOSE BLD-MCNC: 129 MG/DL (ref 70–110)
GLUCOSE BLDC GLUCOMTR-MCNC: 156 MG/DL (ref 70–130)
GRAM STN SPEC: NORMAL
HCT VFR BLD AUTO: 22.4 % (ref 42–52)
HGB BLD-MCNC: 7 G/DL (ref 14–18)
IMM GRANULOCYTES # BLD AUTO: 0.04 10*3/MM3 (ref 0–0.03)
IMM GRANULOCYTES NFR BLD AUTO: 0.7 % (ref 0–0.5)
LYMPHOCYTES # BLD AUTO: 1.45 10*3/MM3 (ref 1–3)
LYMPHOCYTES NFR BLD AUTO: 25.1 % (ref 16–46)
MCH RBC QN AUTO: 29.3 PG (ref 27–33)
MCHC RBC AUTO-ENTMCNC: 31.3 G/DL (ref 33–37)
MCV RBC AUTO: 93.7 FL (ref 80–94)
MONOCYTES # BLD AUTO: 0.7 10*3/MM3 (ref 0.1–0.9)
MONOCYTES NFR BLD AUTO: 12.1 % (ref 0–12)
MYOGLOBIN SERPL-MCNC: 39 NG/ML (ref 0–109)
NEUTROPHILS # BLD AUTO: 3.5 10*3/MM3 (ref 1.4–6.5)
NEUTROPHILS NFR BLD AUTO: 60.7 % (ref 40–75)
OSMOLALITY SERPL CALC.SUM OF ELEC: 264.8 MOSM/KG (ref 273–305)
PLATELET # BLD AUTO: 373 10*3/MM3 (ref 130–400)
PMV BLD AUTO: 9 FL (ref 6–10)
POTASSIUM BLD-SCNC: 4.4 MMOL/L (ref 3.5–5.3)
PREALB SERPL-MCNC: 11 MG/DL (ref 10–36)
PROT SERPL-MCNC: 6.1 G/DL (ref 6–8)
RBC # BLD AUTO: 2.39 10*6/MM3 (ref 4.7–6.1)
SODIUM BLD-SCNC: 130 MMOL/L (ref 135–153)
TROPONIN I SERPL-MCNC: <0.006 NG/ML
WBC NRBC COR # BLD: 5.77 10*3/MM3 (ref 4.5–12.5)

## 2019-01-17 PROCEDURE — 82962 GLUCOSE BLOOD TEST: CPT

## 2019-01-17 PROCEDURE — 93010 ELECTROCARDIOGRAM REPORT: CPT | Performed by: INTERNAL MEDICINE

## 2019-01-17 PROCEDURE — 83880 ASSAY OF NATRIURETIC PEPTIDE: CPT | Performed by: NURSE PRACTITIONER

## 2019-01-17 PROCEDURE — 83874 ASSAY OF MYOGLOBIN: CPT | Performed by: NURSE PRACTITIONER

## 2019-01-17 PROCEDURE — 82550 ASSAY OF CK (CPK): CPT | Performed by: NURSE PRACTITIONER

## 2019-01-17 PROCEDURE — 80053 COMPREHEN METABOLIC PANEL: CPT | Performed by: PHYSICIAN ASSISTANT

## 2019-01-17 PROCEDURE — 84484 ASSAY OF TROPONIN QUANT: CPT | Performed by: NURSE PRACTITIONER

## 2019-01-17 PROCEDURE — 85025 COMPLETE CBC W/AUTO DIFF WBC: CPT | Performed by: PHYSICIAN ASSISTANT

## 2019-01-17 PROCEDURE — 86140 C-REACTIVE PROTEIN: CPT | Performed by: PHYSICIAN ASSISTANT

## 2019-01-17 PROCEDURE — 82553 CREATINE MB FRACTION: CPT | Performed by: NURSE PRACTITIONER

## 2019-01-17 PROCEDURE — 97530 THERAPEUTIC ACTIVITIES: CPT | Performed by: OCCUPATIONAL THERAPIST

## 2019-01-17 PROCEDURE — 93005 ELECTROCARDIOGRAM TRACING: CPT | Performed by: INTERNAL MEDICINE

## 2019-01-17 PROCEDURE — 86140 C-REACTIVE PROTEIN: CPT | Performed by: NURSE PRACTITIONER

## 2019-01-18 LAB
ABO GROUP BLD: NORMAL
ABO GROUP BLD: NORMAL
ALBUMIN SERPL-MCNC: 2.5 G/DL (ref 3.4–4.8)
ALBUMIN SERPL-MCNC: 2.6 G/DL (ref 3.4–4.8)
ALBUMIN/GLOB SERPL: 0.8 G/DL (ref 1.5–2.5)
ALBUMIN/GLOB SERPL: 0.8 G/DL (ref 1.5–2.5)
ALP SERPL-CCNC: 134 U/L (ref 40–129)
ALP SERPL-CCNC: 141 U/L (ref 40–129)
ALT SERPL W P-5'-P-CCNC: 22 U/L (ref 10–44)
ALT SERPL W P-5'-P-CCNC: 27 U/L (ref 10–44)
ANION GAP SERPL CALCULATED.3IONS-SCNC: 5.8 MMOL/L (ref 3.6–11.2)
ANION GAP SERPL CALCULATED.3IONS-SCNC: 6.5 MMOL/L (ref 3.6–11.2)
AST SERPL-CCNC: 27 U/L (ref 10–34)
AST SERPL-CCNC: 29 U/L (ref 10–34)
BACTERIA FLD CULT: ABNORMAL
BACTERIA SPEC ANAEROBE CULT: NORMAL
BASOPHILS # BLD AUTO: 0.01 10*3/MM3 (ref 0–0.3)
BASOPHILS # BLD AUTO: 0.01 10*3/MM3 (ref 0–0.3)
BASOPHILS NFR BLD AUTO: 0.2 % (ref 0–2)
BASOPHILS NFR BLD AUTO: 0.2 % (ref 0–2)
BILIRUB SERPL-MCNC: 0.2 MG/DL (ref 0.2–1.8)
BILIRUB SERPL-MCNC: 0.4 MG/DL (ref 0.2–1.8)
BLD GP AB SCN SERPL QL: NEGATIVE
BUN BLD-MCNC: 20 MG/DL (ref 7–21)
BUN BLD-MCNC: 21 MG/DL (ref 7–21)
BUN/CREAT SERPL: 23 (ref 7–25)
BUN/CREAT SERPL: 25.6 (ref 7–25)
CALCIUM SPEC-SCNC: 8 MG/DL (ref 7.7–10)
CALCIUM SPEC-SCNC: 8 MG/DL (ref 7.7–10)
CHLORIDE SERPL-SCNC: 105 MMOL/L (ref 99–112)
CHLORIDE SERPL-SCNC: 99 MMOL/L (ref 99–112)
CO2 SERPL-SCNC: 21.5 MMOL/L (ref 24.3–31.9)
CO2 SERPL-SCNC: 23.2 MMOL/L (ref 24.3–31.9)
CREAT BLD-MCNC: 0.82 MG/DL (ref 0.43–1.29)
CREAT BLD-MCNC: 0.87 MG/DL (ref 0.43–1.29)
CRP SERPL-MCNC: 4.54 MG/DL (ref 0–0.99)
CRP SERPL-MCNC: 4.97 MG/DL (ref 0–0.99)
DEPRECATED RDW RBC AUTO: 53.6 FL (ref 37–54)
DEPRECATED RDW RBC AUTO: 55.7 FL (ref 37–54)
EOSINOPHIL # BLD AUTO: 0.06 10*3/MM3 (ref 0–0.7)
EOSINOPHIL # BLD AUTO: 0.08 10*3/MM3 (ref 0–0.7)
EOSINOPHIL NFR BLD AUTO: 1.4 % (ref 0–7)
EOSINOPHIL NFR BLD AUTO: 1.5 % (ref 0–7)
ERYTHROCYTE [DISTWIDTH] IN BLOOD BY AUTOMATED COUNT: 16.7 % (ref 11.5–14.5)
ERYTHROCYTE [DISTWIDTH] IN BLOOD BY AUTOMATED COUNT: 16.9 % (ref 11.5–14.5)
GFR SERPL CREATININE-BSD FRML MDRD: 87 ML/MIN/1.73
GFR SERPL CREATININE-BSD FRML MDRD: 93 ML/MIN/1.73
GLOBULIN UR ELPH-MCNC: 3.2 GM/DL
GLOBULIN UR ELPH-MCNC: 3.4 GM/DL
GLUCOSE BLD-MCNC: 158 MG/DL (ref 70–110)
GLUCOSE BLD-MCNC: 197 MG/DL (ref 70–110)
GLUCOSE BLDC GLUCOMTR-MCNC: 134 MG/DL (ref 70–130)
GLUCOSE BLDC GLUCOMTR-MCNC: 153 MG/DL (ref 70–130)
GLUCOSE BLDC GLUCOMTR-MCNC: 161 MG/DL (ref 70–130)
GLUCOSE BLDC GLUCOMTR-MCNC: 181 MG/DL (ref 70–130)
GLUCOSE BLDC GLUCOMTR-MCNC: 181 MG/DL (ref 70–130)
GLUCOSE BLDC GLUCOMTR-MCNC: 194 MG/DL (ref 70–130)
GLUCOSE BLDC GLUCOMTR-MCNC: 198 MG/DL (ref 70–130)
GRAM STN SPEC: ABNORMAL
HCT VFR BLD AUTO: 20.9 % (ref 42–52)
HCT VFR BLD AUTO: 25.1 % (ref 42–52)
HGB BLD-MCNC: 6.9 G/DL (ref 14–18)
HGB BLD-MCNC: 8 G/DL (ref 14–18)
IMM GRANULOCYTES # BLD AUTO: 0.01 10*3/MM3 (ref 0–0.03)
IMM GRANULOCYTES # BLD AUTO: 0.02 10*3/MM3 (ref 0–0.03)
IMM GRANULOCYTES NFR BLD AUTO: 0.2 % (ref 0–0.5)
IMM GRANULOCYTES NFR BLD AUTO: 0.4 % (ref 0–0.5)
LYMPHOCYTES # BLD AUTO: 0.85 10*3/MM3 (ref 1–3)
LYMPHOCYTES # BLD AUTO: 1 10*3/MM3 (ref 1–3)
LYMPHOCYTES NFR BLD AUTO: 18.4 % (ref 16–46)
LYMPHOCYTES NFR BLD AUTO: 19.3 % (ref 16–46)
MAGNESIUM SERPL-MCNC: 1.5 MG/DL (ref 1.7–2.6)
MCH RBC QN AUTO: 29.5 PG (ref 27–33)
MCH RBC QN AUTO: 29.6 PG (ref 27–33)
MCHC RBC AUTO-ENTMCNC: 31.9 G/DL (ref 33–37)
MCHC RBC AUTO-ENTMCNC: 33 G/DL (ref 33–37)
MCV RBC AUTO: 89.7 FL (ref 80–94)
MCV RBC AUTO: 92.6 FL (ref 80–94)
MONOCYTES # BLD AUTO: 0.51 10*3/MM3 (ref 0.1–0.9)
MONOCYTES # BLD AUTO: 0.59 10*3/MM3 (ref 0.1–0.9)
MONOCYTES NFR BLD AUTO: 13.4 % (ref 0–12)
MONOCYTES NFR BLD AUTO: 9.4 % (ref 0–12)
NEUTROPHILS # BLD AUTO: 2.88 10*3/MM3 (ref 1.4–6.5)
NEUTROPHILS # BLD AUTO: 3.82 10*3/MM3 (ref 1.4–6.5)
NEUTROPHILS NFR BLD AUTO: 65.5 % (ref 40–75)
NEUTROPHILS NFR BLD AUTO: 70.1 % (ref 40–75)
OSMOLALITY SERPL CALC.SUM OF ELEC: 263.7 MOSM/KG (ref 273–305)
OSMOLALITY SERPL CALC.SUM OF ELEC: 274.2 MOSM/KG (ref 273–305)
PHOSPHATE SERPL-MCNC: 3.1 MG/DL (ref 2.7–4.5)
PLATELET # BLD AUTO: 295 10*3/MM3 (ref 130–400)
PLATELET # BLD AUTO: 314 10*3/MM3 (ref 130–400)
PMV BLD AUTO: 8.7 FL (ref 6–10)
PMV BLD AUTO: 8.8 FL (ref 6–10)
POTASSIUM BLD-SCNC: 4.2 MMOL/L (ref 3.5–5.3)
POTASSIUM BLD-SCNC: 4.4 MMOL/L (ref 3.5–5.3)
PROT SERPL-MCNC: 5.7 G/DL (ref 6–8)
PROT SERPL-MCNC: 6 G/DL (ref 6–8)
RBC # BLD AUTO: 2.33 10*6/MM3 (ref 4.7–6.1)
RBC # BLD AUTO: 2.71 10*6/MM3 (ref 4.7–6.1)
RH BLD: POSITIVE
RH BLD: POSITIVE
SODIUM BLD-SCNC: 127 MMOL/L (ref 135–153)
SODIUM BLD-SCNC: 134 MMOL/L (ref 135–153)
T&S EXPIRATION DATE: NORMAL
WBC NRBC COR # BLD: 4.4 10*3/MM3 (ref 4.5–12.5)
WBC NRBC COR # BLD: 5.44 10*3/MM3 (ref 4.5–12.5)

## 2019-01-18 PROCEDURE — 86140 C-REACTIVE PROTEIN: CPT | Performed by: PHYSICIAN ASSISTANT

## 2019-01-18 PROCEDURE — 84100 ASSAY OF PHOSPHORUS: CPT | Performed by: INTERNAL MEDICINE

## 2019-01-18 PROCEDURE — 86900 BLOOD TYPING SEROLOGIC ABO: CPT | Performed by: NURSE PRACTITIONER

## 2019-01-18 PROCEDURE — 83735 ASSAY OF MAGNESIUM: CPT | Performed by: INTERNAL MEDICINE

## 2019-01-18 PROCEDURE — 82962 GLUCOSE BLOOD TEST: CPT

## 2019-01-18 PROCEDURE — 86901 BLOOD TYPING SEROLOGIC RH(D): CPT

## 2019-01-18 PROCEDURE — 86900 BLOOD TYPING SEROLOGIC ABO: CPT

## 2019-01-18 PROCEDURE — 86923 COMPATIBILITY TEST ELECTRIC: CPT

## 2019-01-18 PROCEDURE — 80053 COMPREHEN METABOLIC PANEL: CPT | Performed by: PHYSICIAN ASSISTANT

## 2019-01-18 PROCEDURE — 86850 RBC ANTIBODY SCREEN: CPT | Performed by: NURSE PRACTITIONER

## 2019-01-18 PROCEDURE — 85025 COMPLETE CBC W/AUTO DIFF WBC: CPT | Performed by: PHYSICIAN ASSISTANT

## 2019-01-18 PROCEDURE — P9016 RBC LEUKOCYTES REDUCED: HCPCS

## 2019-01-18 PROCEDURE — 86901 BLOOD TYPING SEROLOGIC RH(D): CPT | Performed by: NURSE PRACTITIONER

## 2019-01-19 ENCOUNTER — APPOINTMENT (OUTPATIENT)
Dept: GENERAL RADIOLOGY | Facility: HOSPITAL | Age: 69
End: 2019-01-19

## 2019-01-19 ENCOUNTER — APPOINTMENT (OUTPATIENT)
Dept: CT IMAGING | Facility: HOSPITAL | Age: 69
End: 2019-01-19

## 2019-01-19 LAB
A-A DO2: 36.3 MMHG (ref 0–300)
ABO + RH BLD: NORMAL
ABO + RH BLD: NORMAL
AMMONIA BLD-SCNC: 24 UMOL/L (ref 16–60)
ANISOCYTOSIS BLD QL: NORMAL
ARTERIAL PATENCY WRIST A: ABNORMAL
ATMOSPHERIC PRESS: 723 MMHG
BASE EXCESS BLDA CALC-SCNC: -1.1 MMOL/L
BASOPHILS # BLD AUTO: 0.01 10*3/MM3 (ref 0–0.3)
BASOPHILS NFR BLD AUTO: 0.2 % (ref 0–2)
BDY SITE: ABNORMAL
BH BB BLOOD EXPIRATION DATE: NORMAL
BH BB BLOOD EXPIRATION DATE: NORMAL
BH BB BLOOD TYPE BARCODE: 5100
BH BB BLOOD TYPE BARCODE: 5100
BH BB DISPENSE STATUS: NORMAL
BH BB DISPENSE STATUS: NORMAL
BH BB PRODUCT CODE: NORMAL
BH BB PRODUCT CODE: NORMAL
BH BB UNIT NUMBER: NORMAL
BH BB UNIT NUMBER: NORMAL
BODY TEMPERATURE: 98.6 C
COHGB MFR BLD: 1.3 % (ref 0–5)
CRP SERPL-MCNC: 5.02 MG/DL (ref 0–0.99)
CRP SERPL-MCNC: 5.05 MG/DL (ref 0–0.99)
D-LACTATE SERPL-SCNC: 1 MMOL/L (ref 0.5–2)
DEPRECATED RDW RBC AUTO: 62.9 FL (ref 37–54)
EOSINOPHIL # BLD AUTO: 0.08 10*3/MM3 (ref 0–0.7)
EOSINOPHIL NFR BLD AUTO: 1.7 % (ref 0–7)
ERYTHROCYTE [DISTWIDTH] IN BLOOD BY AUTOMATED COUNT: 17.7 % (ref 11.5–14.5)
GLUCOSE BLDC GLUCOMTR-MCNC: 158 MG/DL (ref 70–130)
GLUCOSE BLDC GLUCOMTR-MCNC: 175 MG/DL (ref 70–130)
GLUCOSE BLDC GLUCOMTR-MCNC: 183 MG/DL (ref 70–130)
HCO3 BLDA-SCNC: 21.8 MMOL/L (ref 22–26)
HCT VFR BLD AUTO: 27.1 % (ref 42–52)
HCT VFR BLD CALC: 29 % (ref 42–52)
HGB BLD-MCNC: 7.7 G/DL (ref 14–18)
HGB BLDA-MCNC: 9.8 G/DL (ref 12–16)
HOROWITZ INDEX BLD+IHG-RTO: 21 %
HYPOCHROMIA BLD QL: NORMAL
IMM GRANULOCYTES # BLD AUTO: 0.02 10*3/MM3 (ref 0–0.03)
IMM GRANULOCYTES NFR BLD AUTO: 0.4 % (ref 0–0.5)
LYMPHOCYTES # BLD AUTO: 0.72 10*3/MM3 (ref 1–3)
LYMPHOCYTES NFR BLD AUTO: 15 % (ref 16–46)
MACROCYTES BLD QL SMEAR: NORMAL
MAGNESIUM SERPL-MCNC: 1.8 MG/DL (ref 1.7–2.6)
MCH RBC QN AUTO: 28.8 PG (ref 27–33)
MCHC RBC AUTO-ENTMCNC: 28.4 G/DL (ref 33–37)
MCV RBC AUTO: 101.5 FL (ref 80–94)
METHGB BLD QL: 0.4 % (ref 0–3)
MODALITY: ABNORMAL
MONOCYTES # BLD AUTO: 0.49 10*3/MM3 (ref 0.1–0.9)
MONOCYTES NFR BLD AUTO: 10.2 % (ref 0–12)
NEUTROPHILS # BLD AUTO: 3.48 10*3/MM3 (ref 1.4–6.5)
NEUTROPHILS NFR BLD AUTO: 72.5 % (ref 40–75)
OXYHGB MFR BLDV: 92.5 % (ref 85–100)
PCO2 BLDA: 29.8 MM HG (ref 35–45)
PH BLDA: 7.48 PH UNITS (ref 7.35–7.45)
PHOSPHATE SERPL-MCNC: 3.2 MG/DL (ref 2.7–4.5)
PLAT MORPH BLD: NORMAL
PLATELET # BLD AUTO: 261 10*3/MM3 (ref 130–400)
PMV BLD AUTO: 9.2 FL (ref 6–10)
PO2 BLDA: 70 MM HG (ref 80–100)
RBC # BLD AUTO: 2.67 10*6/MM3 (ref 4.7–6.1)
SAO2 % BLDCOA: 94.1 % (ref 90–100)
UNIT  ABO: NORMAL
UNIT  ABO: NORMAL
UNIT  RH: NORMAL
UNIT  RH: NORMAL
WBC NRBC COR # BLD: 4.8 10*3/MM3 (ref 4.5–12.5)

## 2019-01-19 PROCEDURE — 71045 X-RAY EXAM CHEST 1 VIEW: CPT

## 2019-01-19 PROCEDURE — 82375 ASSAY CARBOXYHB QUANT: CPT | Performed by: INTERNAL MEDICINE

## 2019-01-19 PROCEDURE — 83050 HGB METHEMOGLOBIN QUAN: CPT | Performed by: INTERNAL MEDICINE

## 2019-01-19 PROCEDURE — 71045 X-RAY EXAM CHEST 1 VIEW: CPT | Performed by: RADIOLOGY

## 2019-01-19 PROCEDURE — 83605 ASSAY OF LACTIC ACID: CPT | Performed by: INTERNAL MEDICINE

## 2019-01-19 PROCEDURE — 82962 GLUCOSE BLOOD TEST: CPT

## 2019-01-19 PROCEDURE — 82805 BLOOD GASES W/O2 SATURATION: CPT | Performed by: INTERNAL MEDICINE

## 2019-01-19 PROCEDURE — 85007 BL SMEAR W/DIFF WBC COUNT: CPT | Performed by: INTERNAL MEDICINE

## 2019-01-19 PROCEDURE — 70450 CT HEAD/BRAIN W/O DYE: CPT

## 2019-01-19 PROCEDURE — 93010 ELECTROCARDIOGRAM REPORT: CPT | Performed by: INTERNAL MEDICINE

## 2019-01-19 PROCEDURE — 85025 COMPLETE CBC W/AUTO DIFF WBC: CPT | Performed by: INTERNAL MEDICINE

## 2019-01-19 PROCEDURE — 86140 C-REACTIVE PROTEIN: CPT | Performed by: INTERNAL MEDICINE

## 2019-01-19 PROCEDURE — 36600 WITHDRAWAL OF ARTERIAL BLOOD: CPT | Performed by: INTERNAL MEDICINE

## 2019-01-19 PROCEDURE — 84100 ASSAY OF PHOSPHORUS: CPT | Performed by: INTERNAL MEDICINE

## 2019-01-19 PROCEDURE — 82140 ASSAY OF AMMONIA: CPT | Performed by: INTERNAL MEDICINE

## 2019-01-19 PROCEDURE — 93005 ELECTROCARDIOGRAM TRACING: CPT | Performed by: NURSE PRACTITIONER

## 2019-01-19 PROCEDURE — 83735 ASSAY OF MAGNESIUM: CPT | Performed by: INTERNAL MEDICINE

## 2019-01-19 PROCEDURE — 70450 CT HEAD/BRAIN W/O DYE: CPT | Performed by: RADIOLOGY

## 2019-01-19 PROCEDURE — 86140 C-REACTIVE PROTEIN: CPT | Performed by: PHYSICIAN ASSISTANT

## 2019-01-20 ENCOUNTER — APPOINTMENT (OUTPATIENT)
Dept: GENERAL RADIOLOGY | Facility: HOSPITAL | Age: 69
End: 2019-01-20

## 2019-01-20 ENCOUNTER — APPOINTMENT (OUTPATIENT)
Dept: CT IMAGING | Facility: HOSPITAL | Age: 69
End: 2019-01-20

## 2019-01-20 LAB
ALBUMIN SERPL-MCNC: 2.7 G/DL (ref 3.4–4.8)
ALBUMIN/GLOB SERPL: 0.8 G/DL (ref 1.5–2.5)
ALP SERPL-CCNC: 140 U/L (ref 40–129)
ALT SERPL W P-5'-P-CCNC: 23 U/L (ref 10–44)
ANION GAP SERPL CALCULATED.3IONS-SCNC: 5.2 MMOL/L (ref 3.6–11.2)
AST SERPL-CCNC: 28 U/L (ref 10–34)
BILIRUB SERPL-MCNC: 0.3 MG/DL (ref 0.2–1.8)
BILIRUB UR QL STRIP: NEGATIVE
BUN BLD-MCNC: 22 MG/DL (ref 7–21)
BUN/CREAT SERPL: 26.2 (ref 7–25)
CALCIUM SPEC-SCNC: 8.2 MG/DL (ref 7.7–10)
CHLORIDE SERPL-SCNC: 100 MMOL/L (ref 99–112)
CLARITY UR: CLEAR
CO2 SERPL-SCNC: 24.8 MMOL/L (ref 24.3–31.9)
COLOR UR: YELLOW
CREAT BLD-MCNC: 0.84 MG/DL (ref 0.43–1.29)
GFR SERPL CREATININE-BSD FRML MDRD: 90 ML/MIN/1.73
GLOBULIN UR ELPH-MCNC: 3.3 GM/DL
GLUCOSE BLD-MCNC: 89 MG/DL (ref 70–110)
GLUCOSE BLDC GLUCOMTR-MCNC: 159 MG/DL (ref 70–130)
GLUCOSE BLDC GLUCOMTR-MCNC: 168 MG/DL (ref 70–130)
GLUCOSE BLDC GLUCOMTR-MCNC: 170 MG/DL (ref 70–130)
GLUCOSE BLDC GLUCOMTR-MCNC: 181 MG/DL (ref 70–130)
GLUCOSE BLDC GLUCOMTR-MCNC: 196 MG/DL (ref 70–130)
GLUCOSE UR STRIP-MCNC: NEGATIVE MG/DL
HGB UR QL STRIP.AUTO: NEGATIVE
KETONES UR QL STRIP: NEGATIVE
LEUKOCYTE ESTERASE UR QL STRIP.AUTO: NEGATIVE
MAGNESIUM SERPL-MCNC: 1.9 MG/DL (ref 1.7–2.6)
NITRITE UR QL STRIP: NEGATIVE
OSMOLALITY SERPL CALC.SUM OF ELEC: 263.6 MOSM/KG (ref 273–305)
PH UR STRIP.AUTO: <=5 [PH] (ref 5–8)
PHOSPHATE SERPL-MCNC: 3.5 MG/DL (ref 2.7–4.5)
POTASSIUM BLD-SCNC: 4.6 MMOL/L (ref 3.5–5.3)
PROT SERPL-MCNC: 6 G/DL (ref 6–8)
PROT UR QL STRIP: NEGATIVE
SODIUM BLD-SCNC: 130 MMOL/L (ref 135–153)
SP GR UR STRIP: 1.02 (ref 1–1.03)
UROBILINOGEN UR QL STRIP: NORMAL

## 2019-01-20 PROCEDURE — 25010000002 IOPAMIDOL 61 % SOLUTION: Performed by: INTERNAL MEDICINE

## 2019-01-20 PROCEDURE — 84100 ASSAY OF PHOSPHORUS: CPT | Performed by: INTERNAL MEDICINE

## 2019-01-20 PROCEDURE — 74177 CT ABD & PELVIS W/CONTRAST: CPT | Performed by: RADIOLOGY

## 2019-01-20 PROCEDURE — 71045 X-RAY EXAM CHEST 1 VIEW: CPT

## 2019-01-20 PROCEDURE — 71260 CT THORAX DX C+: CPT

## 2019-01-20 PROCEDURE — 81003 URINALYSIS AUTO W/O SCOPE: CPT | Performed by: INTERNAL MEDICINE

## 2019-01-20 PROCEDURE — 71260 CT THORAX DX C+: CPT | Performed by: RADIOLOGY

## 2019-01-20 PROCEDURE — 74177 CT ABD & PELVIS W/CONTRAST: CPT

## 2019-01-20 PROCEDURE — 87040 BLOOD CULTURE FOR BACTERIA: CPT | Performed by: NURSE PRACTITIONER

## 2019-01-20 PROCEDURE — 82962 GLUCOSE BLOOD TEST: CPT

## 2019-01-20 PROCEDURE — 83735 ASSAY OF MAGNESIUM: CPT | Performed by: INTERNAL MEDICINE

## 2019-01-20 PROCEDURE — 71045 X-RAY EXAM CHEST 1 VIEW: CPT | Performed by: RADIOLOGY

## 2019-01-20 PROCEDURE — 80053 COMPREHEN METABOLIC PANEL: CPT | Performed by: PHYSICIAN ASSISTANT

## 2019-01-20 RX ADMIN — IOPAMIDOL 100 ML: 612 INJECTION, SOLUTION INTRAVENOUS at 12:45

## 2019-01-21 ENCOUNTER — HOSPITAL ENCOUNTER (INPATIENT)
Facility: HOSPITAL | Age: 69
LOS: 11 days | Discharge: HOME HEALTH | End: 2019-02-01
Attending: INTERNAL MEDICINE | Admitting: HOSPITALIST

## 2019-01-21 ENCOUNTER — DOCUMENTATION (OUTPATIENT)
Dept: OTHER | Facility: HOSPITAL | Age: 69
End: 2019-01-21

## 2019-01-21 DIAGNOSIS — R41.841 COGNITIVE COMMUNICATION DEFICIT: ICD-10-CM

## 2019-01-21 DIAGNOSIS — G93.40 ENCEPHALOPATHY: ICD-10-CM

## 2019-01-21 DIAGNOSIS — J44.9 CHRONIC OBSTRUCTIVE PULMONARY DISEASE, UNSPECIFIED COPD TYPE (HCC): Chronic | ICD-10-CM

## 2019-01-21 DIAGNOSIS — K55.30: ICD-10-CM

## 2019-01-21 DIAGNOSIS — M10.9 GOUT WITHOUT TOPHUS: ICD-10-CM

## 2019-01-21 DIAGNOSIS — C25.0 MALIGNANT NEOPLASM OF HEAD OF PANCREAS (HCC): ICD-10-CM

## 2019-01-21 DIAGNOSIS — T81.43XA INTRA-ABDOMINAL ABSCESS POST-PROCEDURE: ICD-10-CM

## 2019-01-21 DIAGNOSIS — R13.13 PHARYNGEAL DYSPHAGIA: Primary | ICD-10-CM

## 2019-01-21 DIAGNOSIS — Z74.09 IMPAIRED FUNCTIONAL MOBILITY, BALANCE, GAIT, AND ENDURANCE: ICD-10-CM

## 2019-01-21 PROBLEM — R41.82 ALTERED MENTAL STATUS, UNSPECIFIED: Status: ACTIVE | Noted: 2019-01-21

## 2019-01-21 LAB
ALBUMIN SERPL-MCNC: 2.4 G/DL (ref 3.4–4.8)
ALBUMIN SERPL-MCNC: 2.91 G/DL (ref 3.2–4.8)
ALBUMIN/GLOB SERPL: 0.6 G/DL (ref 1.5–2.5)
ALBUMIN/GLOB SERPL: 0.9 G/DL (ref 1.5–2.5)
ALP SERPL-CCNC: 154 U/L (ref 40–129)
ALP SERPL-CCNC: 172 U/L (ref 25–100)
ALT SERPL W P-5'-P-CCNC: 32 U/L (ref 10–44)
ALT SERPL W P-5'-P-CCNC: 34 U/L (ref 7–40)
ANION GAP SERPL CALCULATED.3IONS-SCNC: 4 MMOL/L (ref 3–11)
ANION GAP SERPL CALCULATED.3IONS-SCNC: 6 MMOL/L (ref 3.6–11.2)
AST SERPL-CCNC: 42 U/L (ref 0–33)
AST SERPL-CCNC: 49 U/L (ref 10–34)
BASOPHILS # BLD AUTO: 0.01 10*3/MM3 (ref 0–0.2)
BASOPHILS # BLD AUTO: 0.02 10*3/MM3 (ref 0–0.3)
BASOPHILS NFR BLD AUTO: 0.2 % (ref 0–1)
BASOPHILS NFR BLD AUTO: 0.4 % (ref 0–2)
BILIRUB SERPL-MCNC: 0.2 MG/DL (ref 0.2–1.8)
BILIRUB SERPL-MCNC: 0.4 MG/DL (ref 0.3–1.2)
BUN BLD-MCNC: 18 MG/DL (ref 7–21)
BUN BLD-MCNC: 20 MG/DL (ref 9–23)
BUN/CREAT SERPL: 21.7 (ref 7–25)
BUN/CREAT SERPL: 24.1 (ref 7–25)
CALCIUM SPEC-SCNC: 8.5 MG/DL (ref 7.7–10)
CALCIUM SPEC-SCNC: 8.5 MG/DL (ref 8.7–10.4)
CHLORIDE SERPL-SCNC: 103 MMOL/L (ref 99–112)
CHLORIDE SERPL-SCNC: 99 MMOL/L (ref 99–109)
CO2 SERPL-SCNC: 23 MMOL/L (ref 24.3–31.9)
CO2 SERPL-SCNC: 28 MMOL/L (ref 20–31)
CREAT BLD-MCNC: 0.83 MG/DL (ref 0.43–1.29)
CREAT BLD-MCNC: 0.83 MG/DL (ref 0.6–1.3)
CRP SERPL-MCNC: 5.04 MG/DL (ref 0–0.99)
DEPRECATED RDW RBC AUTO: 52.1 FL (ref 37–54)
DEPRECATED RDW RBC AUTO: 55.1 FL (ref 37–54)
EOSINOPHIL # BLD AUTO: 0.05 10*3/MM3 (ref 0–0.7)
EOSINOPHIL # BLD AUTO: 0.09 10*3/MM3 (ref 0–0.3)
EOSINOPHIL NFR BLD AUTO: 1 % (ref 0–7)
EOSINOPHIL NFR BLD AUTO: 1.6 % (ref 0–3)
ERYTHROCYTE [DISTWIDTH] IN BLOOD BY AUTOMATED COUNT: 16.1 % (ref 11.5–14.5)
ERYTHROCYTE [DISTWIDTH] IN BLOOD BY AUTOMATED COUNT: 16.4 % (ref 11.3–14.5)
GFR SERPL CREATININE-BSD FRML MDRD: 92 ML/MIN/1.73
GFR SERPL CREATININE-BSD FRML MDRD: 92 ML/MIN/1.73
GLOBULIN UR ELPH-MCNC: 3.3 GM/DL
GLOBULIN UR ELPH-MCNC: 3.7 GM/DL
GLUCOSE BLD-MCNC: 125 MG/DL (ref 70–100)
GLUCOSE BLD-MCNC: 172 MG/DL (ref 70–110)
GLUCOSE BLDC GLUCOMTR-MCNC: 118 MG/DL (ref 70–130)
GLUCOSE BLDC GLUCOMTR-MCNC: 184 MG/DL (ref 70–130)
HCT VFR BLD AUTO: 26.3 % (ref 42–52)
HCT VFR BLD AUTO: 28.2 % (ref 38.9–50.9)
HGB BLD-MCNC: 8.1 G/DL (ref 14–18)
HGB BLD-MCNC: 9 G/DL (ref 13.1–17.5)
IMM GRANULOCYTES # BLD AUTO: 0.01 10*3/MM3 (ref 0–0.03)
IMM GRANULOCYTES # BLD AUTO: 0.02 10*3/MM3 (ref 0–0.03)
IMM GRANULOCYTES NFR BLD AUTO: 0.2 % (ref 0–0.5)
IMM GRANULOCYTES NFR BLD AUTO: 0.4 % (ref 0–0.6)
INR PPP: 1.55 (ref 0.85–1.16)
LYMPHOCYTES # BLD AUTO: 0.93 10*3/MM3 (ref 0.6–4.8)
LYMPHOCYTES # BLD AUTO: 0.94 10*3/MM3 (ref 1–3)
LYMPHOCYTES NFR BLD AUTO: 16.3 % (ref 24–44)
LYMPHOCYTES NFR BLD AUTO: 18.4 % (ref 16–46)
MAGNESIUM SERPL-MCNC: 1.6 MG/DL (ref 1.3–2.7)
MAGNESIUM SERPL-MCNC: 1.7 MG/DL (ref 1.7–2.6)
MCH RBC QN AUTO: 28.4 PG (ref 27–33)
MCH RBC QN AUTO: 29 PG (ref 27–31)
MCHC RBC AUTO-ENTMCNC: 30.8 G/DL (ref 33–37)
MCHC RBC AUTO-ENTMCNC: 31.9 G/DL (ref 32–36)
MCV RBC AUTO: 91 FL (ref 80–99)
MCV RBC AUTO: 92.3 FL (ref 80–94)
MONOCYTES # BLD AUTO: 0.52 10*3/MM3 (ref 0.1–0.9)
MONOCYTES # BLD AUTO: 0.66 10*3/MM3 (ref 0–1)
MONOCYTES NFR BLD AUTO: 10.2 % (ref 0–12)
MONOCYTES NFR BLD AUTO: 11.6 % (ref 0–12)
NEUTROPHILS # BLD AUTO: 3.58 10*3/MM3 (ref 1.4–6.5)
NEUTROPHILS # BLD AUTO: 4.02 10*3/MM3 (ref 1.5–8.3)
NEUTROPHILS NFR BLD AUTO: 69.8 % (ref 40–75)
NEUTROPHILS NFR BLD AUTO: 70.3 % (ref 41–71)
OSMOLALITY SERPL CALC.SUM OF ELEC: 270.5 MOSM/KG (ref 273–305)
PHOSPHATE SERPL-MCNC: 3.2 MG/DL (ref 2.7–4.5)
PHOSPHATE SERPL-MCNC: 3.4 MG/DL (ref 2.4–5.1)
PLATELET # BLD AUTO: 314 10*3/MM3 (ref 130–400)
PLATELET # BLD AUTO: 328 10*3/MM3 (ref 150–450)
PMV BLD AUTO: 8.7 FL (ref 6–10)
PMV BLD AUTO: 8.8 FL (ref 6–12)
POTASSIUM BLD-SCNC: 4.3 MMOL/L (ref 3.5–5.5)
POTASSIUM BLD-SCNC: 4.4 MMOL/L (ref 3.5–5.3)
PROCALCITONIN SERPL-MCNC: 0.09 NG/ML
PROT SERPL-MCNC: 6.1 G/DL (ref 6–8)
PROT SERPL-MCNC: 6.2 G/DL (ref 5.7–8.2)
PROTHROMBIN TIME: 17.9 SECONDS (ref 11.2–14.3)
RBC # BLD AUTO: 2.85 10*6/MM3 (ref 4.7–6.1)
RBC # BLD AUTO: 3.1 10*6/MM3 (ref 4.2–5.76)
SODIUM BLD-SCNC: 131 MMOL/L (ref 132–146)
SODIUM BLD-SCNC: 132 MMOL/L (ref 135–153)
TRIGL SERPL-MCNC: 57 MG/DL (ref 0–150)
WBC NRBC COR # BLD: 5.12 10*3/MM3 (ref 4.5–12.5)
WBC NRBC COR # BLD: 5.71 10*3/MM3 (ref 3.5–10.8)

## 2019-01-21 PROCEDURE — 80053 COMPREHEN METABOLIC PANEL: CPT | Performed by: PHYSICIAN ASSISTANT

## 2019-01-21 PROCEDURE — 84100 ASSAY OF PHOSPHORUS: CPT | Performed by: HOSPITALIST

## 2019-01-21 PROCEDURE — 86140 C-REACTIVE PROTEIN: CPT | Performed by: PHYSICIAN ASSISTANT

## 2019-01-21 PROCEDURE — 85025 COMPLETE CBC W/AUTO DIFF WBC: CPT | Performed by: PHYSICIAN ASSISTANT

## 2019-01-21 PROCEDURE — 83735 ASSAY OF MAGNESIUM: CPT | Performed by: HOSPITALIST

## 2019-01-21 PROCEDURE — 85025 COMPLETE CBC W/AUTO DIFF WBC: CPT | Performed by: HOSPITALIST

## 2019-01-21 PROCEDURE — 82962 GLUCOSE BLOOD TEST: CPT

## 2019-01-21 PROCEDURE — 94799 UNLISTED PULMONARY SVC/PX: CPT

## 2019-01-21 PROCEDURE — A9270 NON-COVERED ITEM OR SERVICE: HCPCS | Performed by: HOSPITALIST

## 2019-01-21 PROCEDURE — 99223 1ST HOSP IP/OBS HIGH 75: CPT | Performed by: PSYCHIATRY & NEUROLOGY

## 2019-01-21 PROCEDURE — 25010000002 LINEZOLID 600 MG/300ML SOLUTION: Performed by: INTERNAL MEDICINE

## 2019-01-21 PROCEDURE — 80053 COMPREHEN METABOLIC PANEL: CPT | Performed by: HOSPITALIST

## 2019-01-21 PROCEDURE — 84145 PROCALCITONIN (PCT): CPT | Performed by: HOSPITALIST

## 2019-01-21 PROCEDURE — 63710000001 INSULIN LISPRO (HUMAN) PER 5 UNITS: Performed by: HOSPITALIST

## 2019-01-21 PROCEDURE — 84134 ASSAY OF PREALBUMIN: CPT | Performed by: PHYSICIAN ASSISTANT

## 2019-01-21 PROCEDURE — 25010000002 MEROPENEM: Performed by: INTERNAL MEDICINE

## 2019-01-21 PROCEDURE — 84478 ASSAY OF TRIGLYCERIDES: CPT | Performed by: PHYSICIAN ASSISTANT

## 2019-01-21 PROCEDURE — 84100 ASSAY OF PHOSPHORUS: CPT | Performed by: INTERNAL MEDICINE

## 2019-01-21 PROCEDURE — 83735 ASSAY OF MAGNESIUM: CPT | Performed by: INTERNAL MEDICINE

## 2019-01-21 PROCEDURE — 85610 PROTHROMBIN TIME: CPT | Performed by: HOSPITALIST

## 2019-01-21 PROCEDURE — 87040 BLOOD CULTURE FOR BACTERIA: CPT | Performed by: INTERNAL MEDICINE

## 2019-01-21 PROCEDURE — 99223 1ST HOSP IP/OBS HIGH 75: CPT | Performed by: HOSPITALIST

## 2019-01-21 RX ORDER — PROMETHAZINE HYDROCHLORIDE 25 MG/ML
6.25 INJECTION, SOLUTION INTRAMUSCULAR; INTRAVENOUS EVERY 6 HOURS PRN
Status: DISCONTINUED | OUTPATIENT
Start: 2019-01-21 | End: 2019-02-01 | Stop reason: HOSPADM

## 2019-01-21 RX ORDER — SODIUM CHLORIDE 0.9 % (FLUSH) 0.9 %
3-10 SYRINGE (ML) INJECTION AS NEEDED
Status: DISCONTINUED | OUTPATIENT
Start: 2019-01-21 | End: 2019-02-01 | Stop reason: HOSPADM

## 2019-01-21 RX ORDER — IPRATROPIUM BROMIDE AND ALBUTEROL SULFATE 2.5; .5 MG/3ML; MG/3ML
3 SOLUTION RESPIRATORY (INHALATION)
Status: DISCONTINUED | OUTPATIENT
Start: 2019-01-21 | End: 2019-01-24

## 2019-01-21 RX ORDER — NICOTINE POLACRILEX 4 MG
15 LOZENGE BUCCAL
Status: DISCONTINUED | OUTPATIENT
Start: 2019-01-21 | End: 2019-02-01 | Stop reason: HOSPADM

## 2019-01-21 RX ORDER — DEXTROSE MONOHYDRATE 25 G/50ML
25 INJECTION, SOLUTION INTRAVENOUS
Status: DISCONTINUED | OUTPATIENT
Start: 2019-01-21 | End: 2019-02-01 | Stop reason: HOSPADM

## 2019-01-21 RX ORDER — SODIUM CHLORIDE 0.9 % (FLUSH) 0.9 %
3 SYRINGE (ML) INJECTION EVERY 12 HOURS SCHEDULED
Status: DISCONTINUED | OUTPATIENT
Start: 2019-01-21 | End: 2019-02-01 | Stop reason: HOSPADM

## 2019-01-21 RX ORDER — LINEZOLID 2 MG/ML
600 INJECTION, SOLUTION INTRAVENOUS EVERY 12 HOURS
Status: DISCONTINUED | OUTPATIENT
Start: 2019-01-21 | End: 2019-01-26

## 2019-01-21 RX ORDER — FLECAINIDE ACETATE 50 MG/1
50 TABLET ORAL EVERY 12 HOURS SCHEDULED
Status: DISCONTINUED | OUTPATIENT
Start: 2019-01-21 | End: 2019-02-01 | Stop reason: HOSPADM

## 2019-01-21 RX ORDER — IPRATROPIUM BROMIDE AND ALBUTEROL SULFATE 2.5; .5 MG/3ML; MG/3ML
3 SOLUTION RESPIRATORY (INHALATION) EVERY 6 HOURS PRN
Status: DISCONTINUED | OUTPATIENT
Start: 2019-01-21 | End: 2019-02-01 | Stop reason: HOSPADM

## 2019-01-21 RX ADMIN — MEROPENEM 1 G: 1 INJECTION, POWDER, FOR SOLUTION INTRAVENOUS at 16:48

## 2019-01-21 RX ADMIN — MEROPENEM 1 G: 1 INJECTION, POWDER, FOR SOLUTION INTRAVENOUS at 22:28

## 2019-01-21 RX ADMIN — METOPROLOL TARTRATE 12.5 MG: 25 TABLET, FILM COATED ORAL at 22:42

## 2019-01-21 RX ADMIN — LINEZOLID 600 MG: 600 INJECTION, SOLUTION INTRAVENOUS at 16:37

## 2019-01-21 RX ADMIN — FLECAINIDE ACETATE 50 MG: 50 TABLET ORAL at 22:42

## 2019-01-21 RX ADMIN — IPRATROPIUM BROMIDE AND ALBUTEROL SULFATE 3 ML: 2.5; .5 SOLUTION RESPIRATORY (INHALATION) at 21:16

## 2019-01-21 RX ADMIN — SODIUM CHLORIDE, PRESERVATIVE FREE 3 ML: 5 INJECTION INTRAVENOUS at 22:29

## 2019-01-21 RX ADMIN — MICAFUNGIN SODIUM 100 MG: 20 INJECTION, POWDER, LYOPHILIZED, FOR SOLUTION INTRAVENOUS at 17:53

## 2019-01-21 NOTE — PROGRESS NOTES
Patient's spouse called to give me an update on patient. Patient is still at [facility] that is attached to hospital. Spouse was saying that patient went over to the hospital part for a scan and his abdominal drain got tangled on the railing and was pulled out. Spouse says that patient is now more confused and has fluid collecting around his liver. Spouse also said that the ID MD came in and was trying to get patient transferred to [facility]. I called [facility] to find out if patient was coming back to [facility] and explained what the spouse had said above. Naomi in [facility] Placement said that they did have a bed on either 6A or 6B. Naomi was talking with her Manager and then they would call [facility] back. I called spouse to let her know that she would be hearing something from [facility] soon about the transfer from [facility] to [place]. I asked spouse to please keep me updated. I will let Dr. Brody know that patient is being transferred back to [facility]. AG

## 2019-01-22 LAB
ALBUMIN SERPL-MCNC: 2.55 G/DL (ref 3.2–4.8)
ALBUMIN/GLOB SERPL: 0.9 G/DL (ref 1.5–2.5)
ALP SERPL-CCNC: 161 U/L (ref 25–100)
ALT SERPL W P-5'-P-CCNC: 30 U/L (ref 7–40)
ANION GAP SERPL CALCULATED.3IONS-SCNC: 5 MMOL/L (ref 3–11)
AST SERPL-CCNC: 33 U/L (ref 0–33)
BILIRUB SERPL-MCNC: 0.4 MG/DL (ref 0.3–1.2)
BILIRUB UR QL STRIP: NEGATIVE
BUN BLD-MCNC: 20 MG/DL (ref 9–23)
BUN/CREAT SERPL: 24.4 (ref 7–25)
CALCIUM SPEC-SCNC: 7.8 MG/DL (ref 8.7–10.4)
CHLORIDE SERPL-SCNC: 101 MMOL/L (ref 99–109)
CLARITY UR: CLEAR
CO2 SERPL-SCNC: 27 MMOL/L (ref 20–31)
COLOR UR: YELLOW
CREAT BLD-MCNC: 0.82 MG/DL (ref 0.6–1.3)
DEPRECATED RDW RBC AUTO: 53.4 FL (ref 37–54)
ERYTHROCYTE [DISTWIDTH] IN BLOOD BY AUTOMATED COUNT: 16.2 % (ref 11.3–14.5)
GFR SERPL CREATININE-BSD FRML MDRD: 93 ML/MIN/1.73
GLOBULIN UR ELPH-MCNC: 3 GM/DL
GLUCOSE BLD-MCNC: 127 MG/DL (ref 70–100)
GLUCOSE BLDC GLUCOMTR-MCNC: 100 MG/DL (ref 70–130)
GLUCOSE BLDC GLUCOMTR-MCNC: 115 MG/DL (ref 70–130)
GLUCOSE BLDC GLUCOMTR-MCNC: 116 MG/DL (ref 70–130)
GLUCOSE BLDC GLUCOMTR-MCNC: 141 MG/DL (ref 70–130)
GLUCOSE BLDC GLUCOMTR-MCNC: 141 MG/DL (ref 70–130)
GLUCOSE UR STRIP-MCNC: NEGATIVE MG/DL
HCT VFR BLD AUTO: 27.7 % (ref 38.9–50.9)
HGB BLD-MCNC: 8.9 G/DL (ref 13.1–17.5)
HGB UR QL STRIP.AUTO: NEGATIVE
KETONES UR QL STRIP: NEGATIVE
LEUKOCYTE ESTERASE UR QL STRIP.AUTO: NEGATIVE
MAGNESIUM SERPL-MCNC: 1.5 MG/DL (ref 1.3–2.7)
MCH RBC QN AUTO: 29 PG (ref 27–31)
MCHC RBC AUTO-ENTMCNC: 32.1 G/DL (ref 32–36)
MCV RBC AUTO: 90.2 FL (ref 80–99)
NITRITE UR QL STRIP: NEGATIVE
PH UR STRIP.AUTO: 7 [PH] (ref 5–8)
PLATELET # BLD AUTO: 326 10*3/MM3 (ref 150–450)
PMV BLD AUTO: 8.4 FL (ref 6–12)
POTASSIUM BLD-SCNC: 4.5 MMOL/L (ref 3.5–5.5)
PREALB SERPL-MCNC: 9 MG/DL (ref 10–36)
PROT SERPL-MCNC: 5.5 G/DL (ref 5.7–8.2)
PROT UR QL STRIP: NEGATIVE
RBC # BLD AUTO: 3.07 10*6/MM3 (ref 4.2–5.76)
SODIUM BLD-SCNC: 133 MMOL/L (ref 132–146)
SP GR UR STRIP: 1.01 (ref 1–1.03)
UROBILINOGEN UR QL STRIP: NORMAL
WBC NRBC COR # BLD: 5.1 10*3/MM3 (ref 3.5–10.8)

## 2019-01-22 PROCEDURE — 85027 COMPLETE CBC AUTOMATED: CPT | Performed by: INTERNAL MEDICINE

## 2019-01-22 PROCEDURE — 99231 SBSQ HOSP IP/OBS SF/LOW 25: CPT | Performed by: PSYCHIATRY & NEUROLOGY

## 2019-01-22 PROCEDURE — 80053 COMPREHEN METABOLIC PANEL: CPT | Performed by: INTERNAL MEDICINE

## 2019-01-22 PROCEDURE — 92523 SPEECH SOUND LANG COMPREHEN: CPT

## 2019-01-22 PROCEDURE — 25010000002 ENOXAPARIN PER 10 MG: Performed by: SURGERY

## 2019-01-22 PROCEDURE — 92610 EVALUATE SWALLOWING FUNCTION: CPT

## 2019-01-22 PROCEDURE — 25010000002 MEROPENEM: Performed by: INTERNAL MEDICINE

## 2019-01-22 PROCEDURE — 81003 URINALYSIS AUTO W/O SCOPE: CPT | Performed by: INTERNAL MEDICINE

## 2019-01-22 PROCEDURE — 86301 IMMUNOASSAY TUMOR CA 19-9: CPT | Performed by: SURGERY

## 2019-01-22 PROCEDURE — 94799 UNLISTED PULMONARY SVC/PX: CPT

## 2019-01-22 PROCEDURE — 25010000002 LINEZOLID 600 MG/300ML SOLUTION: Performed by: INTERNAL MEDICINE

## 2019-01-22 PROCEDURE — 82962 GLUCOSE BLOOD TEST: CPT

## 2019-01-22 PROCEDURE — 83735 ASSAY OF MAGNESIUM: CPT | Performed by: HOSPITALIST

## 2019-01-22 PROCEDURE — 99232 SBSQ HOSP IP/OBS MODERATE 35: CPT | Performed by: NURSE PRACTITIONER

## 2019-01-22 RX ORDER — LANSOPRAZOLE
30 KIT EVERY MORNING
Status: DISCONTINUED | OUTPATIENT
Start: 2019-01-23 | End: 2019-02-01 | Stop reason: HOSPADM

## 2019-01-22 RX ORDER — OXYCODONE HYDROCHLORIDE 5 MG/1
5 TABLET ORAL EVERY 4 HOURS PRN
Status: DISCONTINUED | OUTPATIENT
Start: 2019-01-22 | End: 2019-01-24

## 2019-01-22 RX ORDER — ALLOPURINOL 100 MG/1
100 TABLET ORAL DAILY
Status: DISCONTINUED | OUTPATIENT
Start: 2019-01-22 | End: 2019-02-01 | Stop reason: HOSPADM

## 2019-01-22 RX ORDER — MORPHINE SULFATE 2 MG/ML
1 INJECTION, SOLUTION INTRAMUSCULAR; INTRAVENOUS EVERY 4 HOURS PRN
Status: DISCONTINUED | OUTPATIENT
Start: 2019-01-22 | End: 2019-01-22

## 2019-01-22 RX ORDER — PANTOPRAZOLE SODIUM 40 MG/1
40 TABLET, DELAYED RELEASE ORAL
Status: DISCONTINUED | OUTPATIENT
Start: 2019-01-22 | End: 2019-01-22 | Stop reason: CLARIF

## 2019-01-22 RX ORDER — ACETAMINOPHEN 500 MG
500 TABLET ORAL EVERY 6 HOURS PRN
Status: DISCONTINUED | OUTPATIENT
Start: 2019-01-22 | End: 2019-02-01 | Stop reason: HOSPADM

## 2019-01-22 RX ADMIN — ENOXAPARIN SODIUM 90 MG: 100 INJECTION SUBCUTANEOUS at 20:38

## 2019-01-22 RX ADMIN — FLECAINIDE ACETATE 50 MG: 50 TABLET ORAL at 20:38

## 2019-01-22 RX ADMIN — PANTOPRAZOLE SODIUM 40 MG: 40 TABLET, DELAYED RELEASE ORAL at 12:29

## 2019-01-22 RX ADMIN — IPRATROPIUM BROMIDE AND ALBUTEROL SULFATE 3 ML: 2.5; .5 SOLUTION RESPIRATORY (INHALATION) at 06:52

## 2019-01-22 RX ADMIN — METOPROLOL TARTRATE 12.5 MG: 25 TABLET, FILM COATED ORAL at 10:19

## 2019-01-22 RX ADMIN — IPRATROPIUM BROMIDE AND ALBUTEROL SULFATE 3 ML: 2.5; .5 SOLUTION RESPIRATORY (INHALATION) at 15:55

## 2019-01-22 RX ADMIN — ACETAMINOPHEN 500 MG: 500 TABLET, FILM COATED ORAL at 17:06

## 2019-01-22 RX ADMIN — SODIUM CHLORIDE, PRESERVATIVE FREE 3 ML: 5 INJECTION INTRAVENOUS at 20:39

## 2019-01-22 RX ADMIN — ALLOPURINOL 100 MG: 100 TABLET ORAL at 10:19

## 2019-01-22 RX ADMIN — MICAFUNGIN SODIUM 100 MG: 20 INJECTION, POWDER, LYOPHILIZED, FOR SOLUTION INTRAVENOUS at 18:00

## 2019-01-22 RX ADMIN — LINEZOLID 600 MG: 600 INJECTION, SOLUTION INTRAVENOUS at 03:51

## 2019-01-22 RX ADMIN — ENOXAPARIN SODIUM 90 MG: 100 INJECTION SUBCUTANEOUS at 10:20

## 2019-01-22 RX ADMIN — FLECAINIDE ACETATE 50 MG: 50 TABLET ORAL at 10:19

## 2019-01-22 RX ADMIN — IPRATROPIUM BROMIDE AND ALBUTEROL SULFATE 3 ML: 2.5; .5 SOLUTION RESPIRATORY (INHALATION) at 20:58

## 2019-01-22 RX ADMIN — MEROPENEM 1 G: 1 INJECTION, POWDER, FOR SOLUTION INTRAVENOUS at 20:39

## 2019-01-22 RX ADMIN — METOPROLOL TARTRATE 12.5 MG: 25 TABLET, FILM COATED ORAL at 20:38

## 2019-01-22 RX ADMIN — LINEZOLID 600 MG: 600 INJECTION, SOLUTION INTRAVENOUS at 16:22

## 2019-01-22 RX ADMIN — MEROPENEM 1 G: 1 INJECTION, POWDER, FOR SOLUTION INTRAVENOUS at 14:23

## 2019-01-22 RX ADMIN — MEROPENEM 1 G: 1 INJECTION, POWDER, FOR SOLUTION INTRAVENOUS at 06:03

## 2019-01-22 RX ADMIN — SODIUM CHLORIDE, PRESERVATIVE FREE 3 ML: 5 INJECTION INTRAVENOUS at 10:20

## 2019-01-23 ENCOUNTER — MDT ASSESSMENT (OUTPATIENT)
Dept: ONCOLOGY | Facility: CLINIC | Age: 69
End: 2019-01-23

## 2019-01-23 ENCOUNTER — APPOINTMENT (OUTPATIENT)
Dept: GENERAL RADIOLOGY | Facility: HOSPITAL | Age: 69
End: 2019-01-23

## 2019-01-23 LAB
ALBUMIN SERPL-MCNC: 2.77 G/DL (ref 3.2–4.8)
ALBUMIN/GLOB SERPL: 0.9 G/DL (ref 1.5–2.5)
ALP SERPL-CCNC: 150 U/L (ref 25–100)
ALT SERPL W P-5'-P-CCNC: 26 U/L (ref 7–40)
ANION GAP SERPL CALCULATED.3IONS-SCNC: 7 MMOL/L (ref 3–11)
AST SERPL-CCNC: 26 U/L (ref 0–33)
BILIRUB SERPL-MCNC: 0.4 MG/DL (ref 0.3–1.2)
BUN BLD-MCNC: 19 MG/DL (ref 9–23)
BUN/CREAT SERPL: 21.3 (ref 7–25)
CALCIUM SPEC-SCNC: 8.1 MG/DL (ref 8.7–10.4)
CANCER AG19-9 SERPL-ACNC: 34 U/ML (ref 0–35)
CHLORIDE SERPL-SCNC: 98 MMOL/L (ref 99–109)
CO2 SERPL-SCNC: 26 MMOL/L (ref 20–31)
CREAT BLD-MCNC: 0.89 MG/DL (ref 0.6–1.3)
DEPRECATED RDW RBC AUTO: 52.6 FL (ref 37–54)
ERYTHROCYTE [DISTWIDTH] IN BLOOD BY AUTOMATED COUNT: 16 % (ref 11.3–14.5)
GFR SERPL CREATININE-BSD FRML MDRD: 85 ML/MIN/1.73
GLOBULIN UR ELPH-MCNC: 3.2 GM/DL
GLUCOSE BLD-MCNC: 179 MG/DL (ref 70–100)
GLUCOSE BLDC GLUCOMTR-MCNC: 131 MG/DL (ref 70–130)
GLUCOSE BLDC GLUCOMTR-MCNC: 133 MG/DL (ref 70–130)
GLUCOSE BLDC GLUCOMTR-MCNC: 153 MG/DL (ref 70–130)
GLUCOSE BLDC GLUCOMTR-MCNC: 168 MG/DL (ref 70–130)
HCT VFR BLD AUTO: 29.7 % (ref 38.9–50.9)
HGB BLD-MCNC: 9.7 G/DL (ref 13.1–17.5)
MAGNESIUM SERPL-MCNC: 1.4 MG/DL (ref 1.3–2.7)
MCH RBC QN AUTO: 29.2 PG (ref 27–31)
MCHC RBC AUTO-ENTMCNC: 32.7 G/DL (ref 32–36)
MCV RBC AUTO: 89.5 FL (ref 80–99)
PLATELET # BLD AUTO: 380 10*3/MM3 (ref 150–450)
PMV BLD AUTO: 8.9 FL (ref 6–12)
POTASSIUM BLD-SCNC: 3.4 MMOL/L (ref 3.5–5.5)
POTASSIUM BLD-SCNC: 3.6 MMOL/L (ref 3.5–5.5)
PROT SERPL-MCNC: 6 G/DL (ref 5.7–8.2)
RBC # BLD AUTO: 3.32 10*6/MM3 (ref 4.2–5.76)
SODIUM BLD-SCNC: 131 MMOL/L (ref 132–146)
WBC NRBC COR # BLD: 5.74 10*3/MM3 (ref 3.5–10.8)

## 2019-01-23 PROCEDURE — 25010000002 LINEZOLID 600 MG/300ML SOLUTION: Performed by: INTERNAL MEDICINE

## 2019-01-23 PROCEDURE — 25010000002 ENOXAPARIN PER 10 MG: Performed by: SURGERY

## 2019-01-23 PROCEDURE — 74230 X-RAY XM SWLNG FUNCJ C+: CPT

## 2019-01-23 PROCEDURE — 99233 SBSQ HOSP IP/OBS HIGH 50: CPT | Performed by: HOSPITALIST

## 2019-01-23 PROCEDURE — 85027 COMPLETE CBC AUTOMATED: CPT | Performed by: INTERNAL MEDICINE

## 2019-01-23 PROCEDURE — 80053 COMPREHEN METABOLIC PANEL: CPT | Performed by: INTERNAL MEDICINE

## 2019-01-23 PROCEDURE — 63710000001 INSULIN LISPRO (HUMAN) PER 5 UNITS: Performed by: HOSPITALIST

## 2019-01-23 PROCEDURE — 94799 UNLISTED PULMONARY SVC/PX: CPT

## 2019-01-23 PROCEDURE — 83735 ASSAY OF MAGNESIUM: CPT | Performed by: PHYSICIAN ASSISTANT

## 2019-01-23 PROCEDURE — 82962 GLUCOSE BLOOD TEST: CPT

## 2019-01-23 PROCEDURE — 25010000002 MAGNESIUM SULFATE 2 GM/50ML SOLUTION: Performed by: HOSPITALIST

## 2019-01-23 PROCEDURE — 84132 ASSAY OF SERUM POTASSIUM: CPT | Performed by: HOSPITALIST

## 2019-01-23 PROCEDURE — 25010000002 MEROPENEM: Performed by: INTERNAL MEDICINE

## 2019-01-23 PROCEDURE — 92611 MOTION FLUOROSCOPY/SWALLOW: CPT

## 2019-01-23 RX ORDER — POTASSIUM CHLORIDE 7.45 MG/ML
10 INJECTION INTRAVENOUS
Status: DISCONTINUED | OUTPATIENT
Start: 2019-01-23 | End: 2019-02-01 | Stop reason: HOSPADM

## 2019-01-23 RX ORDER — POTASSIUM CHLORIDE 750 MG/1
40 CAPSULE, EXTENDED RELEASE ORAL AS NEEDED
Status: DISCONTINUED | OUTPATIENT
Start: 2019-01-23 | End: 2019-02-01 | Stop reason: HOSPADM

## 2019-01-23 RX ORDER — MAGNESIUM SULFATE HEPTAHYDRATE 40 MG/ML
4 INJECTION, SOLUTION INTRAVENOUS AS NEEDED
Status: DISCONTINUED | OUTPATIENT
Start: 2019-01-23 | End: 2019-02-01 | Stop reason: HOSPADM

## 2019-01-23 RX ORDER — POTASSIUM CHLORIDE 1.5 G/1.77G
40 POWDER, FOR SOLUTION ORAL AS NEEDED
Status: DISCONTINUED | OUTPATIENT
Start: 2019-01-23 | End: 2019-02-01 | Stop reason: HOSPADM

## 2019-01-23 RX ORDER — TERAZOSIN 2 MG/1
2 CAPSULE ORAL NIGHTLY
Status: DISCONTINUED | OUTPATIENT
Start: 2019-01-23 | End: 2019-02-01 | Stop reason: HOSPADM

## 2019-01-23 RX ORDER — MAGNESIUM SULFATE HEPTAHYDRATE 40 MG/ML
2 INJECTION, SOLUTION INTRAVENOUS AS NEEDED
Status: DISCONTINUED | OUTPATIENT
Start: 2019-01-23 | End: 2019-02-01 | Stop reason: HOSPADM

## 2019-01-23 RX ADMIN — BARIUM SULFATE 20 ML: 400 PASTE ORAL at 14:12

## 2019-01-23 RX ADMIN — FLECAINIDE ACETATE 50 MG: 50 TABLET ORAL at 21:49

## 2019-01-23 RX ADMIN — OXYCODONE HYDROCHLORIDE 5 MG: 5 TABLET ORAL at 14:47

## 2019-01-23 RX ADMIN — POTASSIUM CHLORIDE 40 MEQ: 750 CAPSULE, EXTENDED RELEASE ORAL at 14:47

## 2019-01-23 RX ADMIN — MEROPENEM 1 G: 1 INJECTION, POWDER, FOR SOLUTION INTRAVENOUS at 21:47

## 2019-01-23 RX ADMIN — MEROPENEM 1 G: 1 INJECTION, POWDER, FOR SOLUTION INTRAVENOUS at 05:31

## 2019-01-23 RX ADMIN — INSULIN LISPRO 2 UNITS: 100 INJECTION, SOLUTION INTRAVENOUS; SUBCUTANEOUS at 11:27

## 2019-01-23 RX ADMIN — SODIUM CHLORIDE, PRESERVATIVE FREE 3 ML: 5 INJECTION INTRAVENOUS at 09:22

## 2019-01-23 RX ADMIN — METOPROLOL TARTRATE 12.5 MG: 25 TABLET, FILM COATED ORAL at 09:17

## 2019-01-23 RX ADMIN — OXYCODONE HYDROCHLORIDE 5 MG: 5 TABLET ORAL at 05:31

## 2019-01-23 RX ADMIN — POTASSIUM CHLORIDE 40 MEQ: 750 CAPSULE, EXTENDED RELEASE ORAL at 21:49

## 2019-01-23 RX ADMIN — OXYCODONE HYDROCHLORIDE 5 MG: 5 TABLET ORAL at 01:01

## 2019-01-23 RX ADMIN — BARIUM SULFATE 100 ML: 0.81 POWDER, FOR SUSPENSION ORAL at 14:12

## 2019-01-23 RX ADMIN — MEROPENEM 1 G: 1 INJECTION, POWDER, FOR SOLUTION INTRAVENOUS at 14:47

## 2019-01-23 RX ADMIN — LINEZOLID 600 MG: 600 INJECTION, SOLUTION INTRAVENOUS at 14:58

## 2019-01-23 RX ADMIN — TERAZOSIN HYDROCHLORIDE 2 MG: 2 CAPSULE ORAL at 21:48

## 2019-01-23 RX ADMIN — IPRATROPIUM BROMIDE AND ALBUTEROL SULFATE 3 ML: 2.5; .5 SOLUTION RESPIRATORY (INHALATION) at 07:41

## 2019-01-23 RX ADMIN — IPRATROPIUM BROMIDE AND ALBUTEROL SULFATE 3 ML: 2.5; .5 SOLUTION RESPIRATORY (INHALATION) at 11:51

## 2019-01-23 RX ADMIN — OXYCODONE HYDROCHLORIDE 5 MG: 5 TABLET ORAL at 09:18

## 2019-01-23 RX ADMIN — LINEZOLID 600 MG: 600 INJECTION, SOLUTION INTRAVENOUS at 03:26

## 2019-01-23 RX ADMIN — ACETAMINOPHEN 500 MG: 500 TABLET, FILM COATED ORAL at 03:26

## 2019-01-23 RX ADMIN — FLECAINIDE ACETATE 50 MG: 50 TABLET ORAL at 09:18

## 2019-01-23 RX ADMIN — POTASSIUM CHLORIDE 40 MEQ: 750 CAPSULE, EXTENDED RELEASE ORAL at 09:17

## 2019-01-23 RX ADMIN — LANSOPRAZOLE 30 MG: KIT at 05:31

## 2019-01-23 RX ADMIN — SODIUM CHLORIDE, PRESERVATIVE FREE 3 ML: 5 INJECTION INTRAVENOUS at 21:49

## 2019-01-23 RX ADMIN — OXYCODONE HYDROCHLORIDE 5 MG: 5 TABLET ORAL at 21:49

## 2019-01-23 RX ADMIN — IPRATROPIUM BROMIDE AND ALBUTEROL SULFATE 3 ML: 2.5; .5 SOLUTION RESPIRATORY (INHALATION) at 20:11

## 2019-01-23 RX ADMIN — MAGNESIUM SULFATE HEPTAHYDRATE 4 G: 40 INJECTION, SOLUTION INTRAVENOUS at 11:27

## 2019-01-23 RX ADMIN — INSULIN LISPRO 2 UNITS: 100 INJECTION, SOLUTION INTRAVENOUS; SUBCUTANEOUS at 17:05

## 2019-01-23 RX ADMIN — MAGNESIUM SULFATE HEPTAHYDRATE 2 G: 40 INJECTION, SOLUTION INTRAVENOUS at 09:18

## 2019-01-23 RX ADMIN — METOPROLOL TARTRATE 12.5 MG: 25 TABLET, FILM COATED ORAL at 21:48

## 2019-01-23 RX ADMIN — ALLOPURINOL 100 MG: 100 TABLET ORAL at 09:17

## 2019-01-23 RX ADMIN — ENOXAPARIN SODIUM 90 MG: 100 INJECTION SUBCUTANEOUS at 21:48

## 2019-01-23 RX ADMIN — ACETAMINOPHEN 500 MG: 500 TABLET, FILM COATED ORAL at 17:05

## 2019-01-23 RX ADMIN — MICAFUNGIN SODIUM 100 MG: 20 INJECTION, POWDER, LYOPHILIZED, FOR SOLUTION INTRAVENOUS at 17:05

## 2019-01-23 RX ADMIN — IPRATROPIUM BROMIDE AND ALBUTEROL SULFATE 3 ML: 2.5; .5 SOLUTION RESPIRATORY (INHALATION) at 16:25

## 2019-01-23 NOTE — PROGRESS NOTES
GASTROINTESTINAL TREATMENT SUMMARY    PRESENTER: Miquel Brody M.D.  DATE:  2019  SITE: Pancreas    Todd Phillips  :  1950  [address] [facility] [address]  Home phone: [phone]  Mobile phone: [phone]  Work phone: Work phone not available  MRN:  [id number]  #:           HPI: 68 YOWM who presented jaundice following a cholecystectomy for RUQ pain with fatty and greasy meals.       CA 19-9:  4000       REFERRING PROVIDER:   Ronak Downs M.D. (General Surgery) of [place], [place].       PLACE OF RESIDENCE:  [place], [place].   ([facility])       SOCIAL HISTORY:  The patient has never smoked.  He is  (three children) and retired.       FAMILY HISTORY:  Non-contributory.     PAST MEDICAL HISTORY:  AFib (chronic anti-coagulation), diabetes mellitus, COPD, coronary artery disease, duodenitis, chronic kidney disease, antral gastritis.    PAST SURGICAL HISTORY:   Cardiac catheterization, cholecystectomy, kidney stone extraction, screening colonoscopy (2018).    PRE-SURGICAL TREATMENT    Neoadjuvant Therapy recommended:  yes; Completed: no  Neoadjuvant Therapy regimen: doublet chemotherapy was attempted  Patient tolerance: poor.  The patient suffered from failure to thrive, feeding difficulty and recurrent cholangitis  Staging: Initially clinical stage II although markedly elevated CA 19-9 provided rationale for neoadjuvant approach    IMAGIN/05/19 ([facility]):  CT abdomen/pelvis - There are small bilateral pleural effusions. There is a fluid collection seen surrounding the liver and spleen containing air in the fluid collection around the liver. There is air seen scattered throughout the abdomen, which may be postoperative. There is air identified within the mesenteric vessels with findings suggesting pneumatosis throughout scattered small bowel loops within the mid-abdomen. Fluid throughout the colon suggesting clinical presentation of diarrhea.       01/10/19 ([facility]):  CT  abdomen/pelvis - Since the previous exam, the patient's subcapsular fluid  collection has significantly further enlarged, approximately 21 x 10 cm near the dome of the liver and 24 x 5.7 cm more inferiorly. Fluid collection extends around the medial margin of the left lobe as well.  There are multiple air bubbles along the margins of the collection and internal to it indicating possible multilocular or thick/viscous collection. There is considerable mass effect on the liver.    SURGICAL TREATMENT    Procedure/performed date:12/31/2018 ([facility])  Discharge date: 1/15/2019.  Complications: yes; If yes, complications included: necrotizing entercolitis form tube feeding, pancreatic fistula with abscess, Altered mental status  Readmissions: yes; if yes, indication was: failure to thrive.  Currently nearing ability to discharge home.    FINAL PATHOLOGY:  Final Diagnosis   1. LIVER, WEDGE BIOPSY:  Small bland ductular proliferation compatible with bile duct adenoma.   Negative for metastatic carcinoma.   2. OMENTUM:  Mild chronic inflammation and surface adhesions; negative for neoplasm.   3. HEPATIC ARTERY LYMPH NODE:  Sinus histiocytosis; single lymph node negative for metastatic carcinoma. (0/1)  4. SUBMITTED BILE DUCT MARGIN:  Margin with chronic inflammation; negative for adenocarcinoma.   5. AORTA-CAVAL LYMPH NODES:  Two lymph nodes negative for metastatic carcinoma. (0/2)  6. PANCREATODUODENECTOMY (WHIPPLE PROCEDURE);  Invasive moderately differentiated adenocarcinoma arising in head of pancreas and involving duodenum.  Gross tumor size 3.7 cm in greatest dimension.  Perineural and peripancreatic extension identified.   Soft tissue at superior mesenteric artery positive for neoplasm.  Posterior/uncinate margin involved by neoplasm.  Five of twelve peripancreatic lymph nodes positive for metastatic neoplasm. See Template.        PANCREAS EXOCRINE:  TYPE OF SPECIMEN/PROCEDURE: Whipple  procedure  SPECIMEN INTEGRITY: Intact  TUMOR SITE (HEAD, BODY, TAIL): Head  TUMOR SIZE (GREATEST DIMENSION): 3.7 cm greatest dimension  HISTOLOGIC TYPE: Adenocarcinoma  HISTOLOGIC stGstRstAstDstEst:st st1st TUMOR EXTENSION: Neoplasm involves duodenum and peripancreatic fat  SURGICAL MARGINS, IF INVOLVED, (SPECIFY WHERE): Involved  MARGINS EXAMINED: See below  PARENCHYMAL MARGIN: Not involved  UNCINATE: Involved  BILE DUCT: Not involved  DISTAL MARGIN: Not involved   PROXIMAL MARGIN: Not involved   OTHER MARGINS: SMA margin involved   CLOSEST MARGIN: Margins involved    SPECIFIC MARGIN: SMA and posterior/uncinate  INVADES CELIAC AXIS OR SMA: No  VASCULAR/LYMPHATIC INVASION: Present  PERINEURAL INVASION:  Present  REGIONAL LYMPH NODES: Submitted  NUMBER OF LYMPH NODES INVOLVED: 5  NUMBER OF LYMPH NODES EXAMINED: 15  EXTRACAPSULAR EXTENSION: Focally present  TREATMENT EFFECT: No known presurgical therapy  ADDITIONAL PATHOLOGIC FINDINGS: Chronic pancreatitis   OTHER STUDIES: Available upon request  AJCC PATHOLOGIC STAGE: (COMPLETED BY PATHOLOGIST BASED ONLY ON TISSUE FINDINGS, MORE EXTENSIVE DISEASE MAY NOT BE KNOW TO THE PATHOLOGIST)  pT=  2   pN= 2  AJCC PATHOLOGIC STAGE: III  JFJ/klb:mbc            Reviewed department report: yes  Reviewed department slides:  yes    FURTHER RECOMMENDATIONS    Further surgery indicated: No.    Further chemotherapy recommended: yes.  His functional status may preclude further therapy but in order to potentially avoid symptomatic local recurrence, consideration for chemoradiation could be considered based on his recovery  Radiation recommended: yes.  If recovery dictates  Eligibility for clinical trial: none available.  Referral to Genetic Counseling recommended: no.  Referral to Palliative Care recommended: no.  Social Work/Home Health recommended: yes.  The patient will have home health support for assistance with physical therapy and enteral therapy    Electronically signed by:  Tania  Sae  01/23/19  8:21 AM    Privileged and Confidential Patient Safety Work Product:  The information contained herein has been compiled as part of the [facility] Patient Safety Evaluation System and is deemed to be a Patient Safety Work Product and is privileged and confidential.  Disclaimer:  Multidisciplinary cancer conferences provide consultative services to formulate an effective treatment plan for patients and include physician representation from medical oncology, radiation oncology, radiology, surgery, and pathology.  AJCC or other appropriate staging is discussed, along with site-specific prognostic indicators and treatment planning used by evidence-based treatment guidelines.  Treatment plans which are discussed during conference may/may not necessarily be followed by the patient’s managing physician(s), as there may be other factors taken into consideration which impact the treatment plan.  The specific treatment plan is ultimately determined on an individual basis by the patient and the physician(s) involved in his/her care.

## 2019-01-24 ENCOUNTER — APPOINTMENT (OUTPATIENT)
Dept: CT IMAGING | Facility: HOSPITAL | Age: 69
End: 2019-01-24

## 2019-01-24 LAB
APTT PPP: 40.7 SECONDS (ref 24–37)
GLUCOSE BLDC GLUCOMTR-MCNC: 152 MG/DL (ref 70–130)
GLUCOSE BLDC GLUCOMTR-MCNC: 169 MG/DL (ref 70–130)
GLUCOSE BLDC GLUCOMTR-MCNC: 85 MG/DL (ref 70–130)
GLUCOSE BLDC GLUCOMTR-MCNC: 98 MG/DL (ref 70–130)
INR PPP: 1.35 (ref 0.85–1.16)
MAGNESIUM SERPL-MCNC: 1.8 MG/DL (ref 1.3–2.7)
POTASSIUM BLD-SCNC: 4.3 MMOL/L (ref 3.5–5.5)
PROTHROMBIN TIME: 16.1 SECONDS (ref 11.2–14.3)

## 2019-01-24 PROCEDURE — 82962 GLUCOSE BLOOD TEST: CPT

## 2019-01-24 PROCEDURE — 63710000001 INSULIN LISPRO (HUMAN) PER 5 UNITS: Performed by: HOSPITALIST

## 2019-01-24 PROCEDURE — 99233 SBSQ HOSP IP/OBS HIGH 50: CPT | Performed by: HOSPITALIST

## 2019-01-24 PROCEDURE — 85610 PROTHROMBIN TIME: CPT | Performed by: RADIOLOGY

## 2019-01-24 PROCEDURE — 84132 ASSAY OF SERUM POTASSIUM: CPT | Performed by: HOSPITALIST

## 2019-01-24 PROCEDURE — 25010000002 MEROPENEM: Performed by: INTERNAL MEDICINE

## 2019-01-24 PROCEDURE — 83735 ASSAY OF MAGNESIUM: CPT | Performed by: HOSPITALIST

## 2019-01-24 PROCEDURE — 25010000002 ENOXAPARIN PER 10 MG: Performed by: SURGERY

## 2019-01-24 PROCEDURE — 85730 THROMBOPLASTIN TIME PARTIAL: CPT | Performed by: RADIOLOGY

## 2019-01-24 PROCEDURE — 25010000002 LINEZOLID 600 MG/300ML SOLUTION: Performed by: INTERNAL MEDICINE

## 2019-01-24 PROCEDURE — 74176 CT ABD & PELVIS W/O CONTRAST: CPT

## 2019-01-24 RX ORDER — IPRATROPIUM BROMIDE AND ALBUTEROL SULFATE 2.5; .5 MG/3ML; MG/3ML
3 SOLUTION RESPIRATORY (INHALATION) 4 TIMES DAILY PRN
Status: DISCONTINUED | OUTPATIENT
Start: 2019-01-24 | End: 2019-02-01 | Stop reason: HOSPADM

## 2019-01-24 RX ORDER — OXYCODONE HYDROCHLORIDE 5 MG/1
5 TABLET ORAL EVERY 6 HOURS PRN
Status: DISCONTINUED | OUTPATIENT
Start: 2019-01-24 | End: 2019-02-01 | Stop reason: HOSPADM

## 2019-01-24 RX ADMIN — METOPROLOL TARTRATE 12.5 MG: 25 TABLET, FILM COATED ORAL at 20:39

## 2019-01-24 RX ADMIN — LANSOPRAZOLE 30 MG: KIT at 05:39

## 2019-01-24 RX ADMIN — OXYCODONE HYDROCHLORIDE 5 MG: 5 TABLET ORAL at 12:45

## 2019-01-24 RX ADMIN — ENOXAPARIN SODIUM 90 MG: 100 INJECTION SUBCUTANEOUS at 10:13

## 2019-01-24 RX ADMIN — INSULIN LISPRO 2 UNITS: 100 INJECTION, SOLUTION INTRAVENOUS; SUBCUTANEOUS at 10:15

## 2019-01-24 RX ADMIN — MICAFUNGIN SODIUM 100 MG: 20 INJECTION, POWDER, LYOPHILIZED, FOR SOLUTION INTRAVENOUS at 17:54

## 2019-01-24 RX ADMIN — MAGNESIUM SULFATE HEPTAHYDRATE 4 G: 40 INJECTION, SOLUTION INTRAVENOUS at 10:14

## 2019-01-24 RX ADMIN — ACETAMINOPHEN 500 MG: 500 TABLET, FILM COATED ORAL at 18:02

## 2019-01-24 RX ADMIN — POTASSIUM CHLORIDE 40 MEQ: 750 CAPSULE, EXTENDED RELEASE ORAL at 01:54

## 2019-01-24 RX ADMIN — INSULIN LISPRO 2 UNITS: 100 INJECTION, SOLUTION INTRAVENOUS; SUBCUTANEOUS at 17:57

## 2019-01-24 RX ADMIN — ACETAMINOPHEN 500 MG: 500 TABLET, FILM COATED ORAL at 10:14

## 2019-01-24 RX ADMIN — LINEZOLID 600 MG: 600 INJECTION, SOLUTION INTRAVENOUS at 16:14

## 2019-01-24 RX ADMIN — FLECAINIDE ACETATE 50 MG: 50 TABLET ORAL at 20:39

## 2019-01-24 RX ADMIN — OXYCODONE HYDROCHLORIDE 5 MG: 5 TABLET ORAL at 05:40

## 2019-01-24 RX ADMIN — ENOXAPARIN SODIUM 90 MG: 100 INJECTION SUBCUTANEOUS at 19:54

## 2019-01-24 RX ADMIN — SODIUM CHLORIDE, PRESERVATIVE FREE 10 ML: 5 INJECTION INTRAVENOUS at 10:14

## 2019-01-24 RX ADMIN — MEROPENEM 1 G: 1 INJECTION, POWDER, FOR SOLUTION INTRAVENOUS at 05:39

## 2019-01-24 RX ADMIN — MEROPENEM 1 G: 1 INJECTION, POWDER, FOR SOLUTION INTRAVENOUS at 13:57

## 2019-01-24 RX ADMIN — TERAZOSIN HYDROCHLORIDE 2 MG: 2 CAPSULE ORAL at 20:39

## 2019-01-24 RX ADMIN — FLECAINIDE ACETATE 50 MG: 50 TABLET ORAL at 10:13

## 2019-01-24 RX ADMIN — METOPROLOL TARTRATE 12.5 MG: 25 TABLET, FILM COATED ORAL at 10:11

## 2019-01-24 RX ADMIN — ALLOPURINOL 100 MG: 100 TABLET ORAL at 10:13

## 2019-01-24 RX ADMIN — LINEZOLID 600 MG: 600 INJECTION, SOLUTION INTRAVENOUS at 04:08

## 2019-01-25 ENCOUNTER — APPOINTMENT (OUTPATIENT)
Dept: CT IMAGING | Facility: HOSPITAL | Age: 69
End: 2019-01-25
Attending: SURGERY

## 2019-01-25 LAB
AMYLASE FLD-CCNC: >4500 U/L
BACTERIA SPEC AEROBE CULT: NORMAL
BACTERIA SPEC AEROBE CULT: NORMAL
GLUCOSE BLDC GLUCOMTR-MCNC: 129 MG/DL (ref 70–130)
GLUCOSE BLDC GLUCOMTR-MCNC: 131 MG/DL (ref 70–130)
GLUCOSE BLDC GLUCOMTR-MCNC: 137 MG/DL (ref 70–130)
GLUCOSE BLDC GLUCOMTR-MCNC: 157 MG/DL (ref 70–130)
MAGNESIUM SERPL-MCNC: 2 MG/DL (ref 1.3–2.7)

## 2019-01-25 PROCEDURE — 87070 CULTURE OTHR SPECIMN AEROBIC: CPT | Performed by: SURGERY

## 2019-01-25 PROCEDURE — 87106 FUNGI IDENTIFICATION YEAST: CPT | Performed by: SURGERY

## 2019-01-25 PROCEDURE — 25010000002 LINEZOLID 600 MG/300ML SOLUTION: Performed by: INTERNAL MEDICINE

## 2019-01-25 PROCEDURE — C1729 CATH, DRAINAGE: HCPCS

## 2019-01-25 PROCEDURE — 82150 ASSAY OF AMYLASE: CPT | Performed by: SURGERY

## 2019-01-25 PROCEDURE — 83735 ASSAY OF MAGNESIUM: CPT | Performed by: HOSPITALIST

## 2019-01-25 PROCEDURE — 87102 FUNGUS ISOLATION CULTURE: CPT | Performed by: SURGERY

## 2019-01-25 PROCEDURE — 82962 GLUCOSE BLOOD TEST: CPT

## 2019-01-25 PROCEDURE — 87077 CULTURE AEROBIC IDENTIFY: CPT | Performed by: SURGERY

## 2019-01-25 PROCEDURE — 25010000002 MEROPENEM: Performed by: INTERNAL MEDICINE

## 2019-01-25 PROCEDURE — 87015 SPECIMEN INFECT AGNT CONCNTJ: CPT | Performed by: SURGERY

## 2019-01-25 PROCEDURE — 87205 SMEAR GRAM STAIN: CPT | Performed by: SURGERY

## 2019-01-25 PROCEDURE — 99233 SBSQ HOSP IP/OBS HIGH 50: CPT | Performed by: HOSPITALIST

## 2019-01-25 PROCEDURE — 75989 ABSCESS DRAINAGE UNDER X-RAY: CPT

## 2019-01-25 PROCEDURE — 0W9F30Z DRAINAGE OF ABDOMINAL WALL WITH DRAINAGE DEVICE, PERCUTANEOUS APPROACH: ICD-10-PCS | Performed by: RADIOLOGY

## 2019-01-25 PROCEDURE — 87075 CULTR BACTERIA EXCEPT BLOOD: CPT | Performed by: SURGERY

## 2019-01-25 PROCEDURE — 87186 SC STD MICRODIL/AGAR DIL: CPT | Performed by: SURGERY

## 2019-01-25 RX ORDER — LIDOCAINE HYDROCHLORIDE 10 MG/ML
30 INJECTION, SOLUTION EPIDURAL; INFILTRATION; INTRACAUDAL; PERINEURAL ONCE
Status: DISCONTINUED | OUTPATIENT
Start: 2019-01-25 | End: 2019-02-01 | Stop reason: HOSPADM

## 2019-01-25 RX ADMIN — MEROPENEM 1 G: 1 INJECTION, POWDER, FOR SOLUTION INTRAVENOUS at 23:49

## 2019-01-25 RX ADMIN — ACETAMINOPHEN 500 MG: 500 TABLET, FILM COATED ORAL at 17:57

## 2019-01-25 RX ADMIN — METOPROLOL TARTRATE 12.5 MG: 25 TABLET, FILM COATED ORAL at 21:13

## 2019-01-25 RX ADMIN — LINEZOLID 600 MG: 600 INJECTION, SOLUTION INTRAVENOUS at 04:10

## 2019-01-25 RX ADMIN — MICAFUNGIN SODIUM 100 MG: 20 INJECTION, POWDER, LYOPHILIZED, FOR SOLUTION INTRAVENOUS at 17:57

## 2019-01-25 RX ADMIN — ACETAMINOPHEN 500 MG: 500 TABLET, FILM COATED ORAL at 08:13

## 2019-01-25 RX ADMIN — OXYCODONE HYDROCHLORIDE 5 MG: 5 TABLET ORAL at 12:56

## 2019-01-25 RX ADMIN — ALLOPURINOL 100 MG: 100 TABLET ORAL at 08:12

## 2019-01-25 RX ADMIN — FLECAINIDE ACETATE 50 MG: 50 TABLET ORAL at 08:11

## 2019-01-25 RX ADMIN — MEROPENEM 1 G: 1 INJECTION, POWDER, FOR SOLUTION INTRAVENOUS at 00:30

## 2019-01-25 RX ADMIN — MEROPENEM 1 G: 1 INJECTION, POWDER, FOR SOLUTION INTRAVENOUS at 06:31

## 2019-01-25 RX ADMIN — LINEZOLID 600 MG: 600 INJECTION, SOLUTION INTRAVENOUS at 16:45

## 2019-01-25 RX ADMIN — OXYCODONE HYDROCHLORIDE 5 MG: 5 TABLET ORAL at 21:13

## 2019-01-25 RX ADMIN — LANSOPRAZOLE 30 MG: KIT at 08:11

## 2019-01-25 RX ADMIN — METOPROLOL TARTRATE 12.5 MG: 25 TABLET, FILM COATED ORAL at 08:11

## 2019-01-25 RX ADMIN — APIXABAN 5 MG: 5 TABLET, FILM COATED ORAL at 21:13

## 2019-01-25 RX ADMIN — TERAZOSIN HYDROCHLORIDE 2 MG: 2 CAPSULE ORAL at 21:13

## 2019-01-25 RX ADMIN — FLECAINIDE ACETATE 50 MG: 50 TABLET ORAL at 21:13

## 2019-01-25 RX ADMIN — SODIUM CHLORIDE, PRESERVATIVE FREE 10 ML: 5 INJECTION INTRAVENOUS at 08:12

## 2019-01-25 RX ADMIN — MEROPENEM 1 G: 1 INJECTION, POWDER, FOR SOLUTION INTRAVENOUS at 16:45

## 2019-01-26 PROBLEM — G93.41 METABOLIC ENCEPHALOPATHY: Status: ACTIVE | Noted: 2019-01-21

## 2019-01-26 LAB
ALBUMIN SERPL-MCNC: 2.69 G/DL (ref 3.2–4.8)
ALBUMIN/GLOB SERPL: 0.9 G/DL (ref 1.5–2.5)
ALP SERPL-CCNC: 132 U/L (ref 25–100)
ALT SERPL W P-5'-P-CCNC: 17 U/L (ref 7–40)
ANION GAP SERPL CALCULATED.3IONS-SCNC: 5 MMOL/L (ref 3–11)
AST SERPL-CCNC: 20 U/L (ref 0–33)
BACTERIA SPEC AEROBE CULT: NORMAL
BACTERIA SPEC AEROBE CULT: NORMAL
BILIRUB SERPL-MCNC: 0.3 MG/DL (ref 0.3–1.2)
BNP SERPL-MCNC: 50 PG/ML (ref 0–100)
BUN BLD-MCNC: 20 MG/DL (ref 9–23)
BUN/CREAT SERPL: 22 (ref 7–25)
C DIFF TOX GENS STL QL NAA+PROBE: NOT DETECTED
CALCIUM SPEC-SCNC: 8 MG/DL (ref 8.7–10.4)
CHLORIDE SERPL-SCNC: 101 MMOL/L (ref 99–109)
CK SERPL-CCNC: 25 U/L (ref 26–174)
CO2 SERPL-SCNC: 25 MMOL/L (ref 20–31)
CREAT BLD-MCNC: 0.91 MG/DL (ref 0.6–1.3)
DEPRECATED RDW RBC AUTO: 54.5 FL (ref 37–54)
ERYTHROCYTE [DISTWIDTH] IN BLOOD BY AUTOMATED COUNT: 16.5 % (ref 11.3–14.5)
GFR SERPL CREATININE-BSD FRML MDRD: 82 ML/MIN/1.73
GLOBULIN UR ELPH-MCNC: 3 GM/DL
GLUCOSE BLD-MCNC: 156 MG/DL (ref 70–100)
GLUCOSE BLDC GLUCOMTR-MCNC: 107 MG/DL (ref 70–130)
GLUCOSE BLDC GLUCOMTR-MCNC: 164 MG/DL (ref 70–130)
GLUCOSE BLDC GLUCOMTR-MCNC: 184 MG/DL (ref 70–130)
GLUCOSE BLDC GLUCOMTR-MCNC: 191 MG/DL (ref 70–130)
HCT VFR BLD AUTO: 28.4 % (ref 38.9–50.9)
HGB BLD-MCNC: 9.2 G/DL (ref 13.1–17.5)
MAGNESIUM SERPL-MCNC: 1.8 MG/DL (ref 1.3–2.7)
MCH RBC QN AUTO: 29.2 PG (ref 27–31)
MCHC RBC AUTO-ENTMCNC: 32.4 G/DL (ref 32–36)
MCV RBC AUTO: 90.2 FL (ref 80–99)
PHOSPHATE SERPL-MCNC: 2.8 MG/DL (ref 2.4–5.1)
PLATELET # BLD AUTO: 303 10*3/MM3 (ref 150–450)
PMV BLD AUTO: 8.4 FL (ref 6–12)
POTASSIUM BLD-SCNC: 3.6 MMOL/L (ref 3.5–5.5)
PROT SERPL-MCNC: 5.7 G/DL (ref 5.7–8.2)
RBC # BLD AUTO: 3.15 10*6/MM3 (ref 4.2–5.76)
SODIUM BLD-SCNC: 131 MMOL/L (ref 132–146)
WBC NRBC COR # BLD: 3.72 10*3/MM3 (ref 3.5–10.8)

## 2019-01-26 PROCEDURE — 99232 SBSQ HOSP IP/OBS MODERATE 35: CPT | Performed by: INTERNAL MEDICINE

## 2019-01-26 PROCEDURE — 83735 ASSAY OF MAGNESIUM: CPT | Performed by: HOSPITALIST

## 2019-01-26 PROCEDURE — 82550 ASSAY OF CK (CPK): CPT | Performed by: INTERNAL MEDICINE

## 2019-01-26 PROCEDURE — 83880 ASSAY OF NATRIURETIC PEPTIDE: CPT | Performed by: HOSPITALIST

## 2019-01-26 PROCEDURE — 84100 ASSAY OF PHOSPHORUS: CPT | Performed by: HOSPITALIST

## 2019-01-26 PROCEDURE — 25010000002 MEROPENEM: Performed by: INTERNAL MEDICINE

## 2019-01-26 PROCEDURE — 25010000002 LINEZOLID 600 MG/300ML SOLUTION: Performed by: INTERNAL MEDICINE

## 2019-01-26 PROCEDURE — 87493 C DIFF AMPLIFIED PROBE: CPT | Performed by: INTERNAL MEDICINE

## 2019-01-26 PROCEDURE — 80053 COMPREHEN METABOLIC PANEL: CPT | Performed by: INTERNAL MEDICINE

## 2019-01-26 PROCEDURE — 82962 GLUCOSE BLOOD TEST: CPT

## 2019-01-26 PROCEDURE — 25010000002 DAPTOMYCIN PER 1 MG: Performed by: INTERNAL MEDICINE

## 2019-01-26 PROCEDURE — 85027 COMPLETE CBC AUTOMATED: CPT | Performed by: INTERNAL MEDICINE

## 2019-01-26 PROCEDURE — 94799 UNLISTED PULMONARY SVC/PX: CPT

## 2019-01-26 RX ORDER — GUAR GUM
1 PACKET (EA) ORAL 2 TIMES DAILY
Status: DISCONTINUED | OUTPATIENT
Start: 2019-01-26 | End: 2019-02-01 | Stop reason: HOSPADM

## 2019-01-26 RX ORDER — L.ACID,PARA/B.BIFIDUM/S.THERM 8B CELL
1 CAPSULE ORAL DAILY
Status: DISCONTINUED | OUTPATIENT
Start: 2019-01-26 | End: 2019-02-01 | Stop reason: HOSPADM

## 2019-01-26 RX ORDER — HYDROCODONE BITARTRATE AND ACETAMINOPHEN 5; 325 MG/1; MG/1
1 TABLET ORAL
Status: COMPLETED | OUTPATIENT
Start: 2019-01-26 | End: 2019-01-27

## 2019-01-26 RX ADMIN — APIXABAN 5 MG: 5 TABLET, FILM COATED ORAL at 21:16

## 2019-01-26 RX ADMIN — POTASSIUM CHLORIDE 40 MEQ: 750 CAPSULE, EXTENDED RELEASE ORAL at 12:10

## 2019-01-26 RX ADMIN — ACETAMINOPHEN 500 MG: 500 TABLET, FILM COATED ORAL at 18:37

## 2019-01-26 RX ADMIN — INSULIN LISPRO 2 UNITS: 100 INJECTION, SOLUTION INTRAVENOUS; SUBCUTANEOUS at 21:16

## 2019-01-26 RX ADMIN — INSULIN LISPRO 2 UNITS: 100 INJECTION, SOLUTION INTRAVENOUS; SUBCUTANEOUS at 08:58

## 2019-01-26 RX ADMIN — FLECAINIDE ACETATE 50 MG: 50 TABLET ORAL at 08:58

## 2019-01-26 RX ADMIN — LANSOPRAZOLE 30 MG: KIT at 09:00

## 2019-01-26 RX ADMIN — ALLOPURINOL 100 MG: 100 TABLET ORAL at 08:58

## 2019-01-26 RX ADMIN — Medication 1 PACKET: at 23:39

## 2019-01-26 RX ADMIN — MICAFUNGIN SODIUM 100 MG: 20 INJECTION, POWDER, LYOPHILIZED, FOR SOLUTION INTRAVENOUS at 17:26

## 2019-01-26 RX ADMIN — FLECAINIDE ACETATE 50 MG: 50 TABLET ORAL at 21:16

## 2019-01-26 RX ADMIN — POTASSIUM CHLORIDE 40 MEQ: 750 CAPSULE, EXTENDED RELEASE ORAL at 06:43

## 2019-01-26 RX ADMIN — MEROPENEM 1 G: 1 INJECTION, POWDER, FOR SOLUTION INTRAVENOUS at 21:16

## 2019-01-26 RX ADMIN — DAPTOMYCIN 550 MG: 500 INJECTION, POWDER, LYOPHILIZED, FOR SOLUTION INTRAVENOUS at 08:58

## 2019-01-26 RX ADMIN — INSULIN LISPRO 2 UNITS: 100 INJECTION, SOLUTION INTRAVENOUS; SUBCUTANEOUS at 17:26

## 2019-01-26 RX ADMIN — Medication 1 CAPSULE: at 17:26

## 2019-01-26 RX ADMIN — APIXABAN 5 MG: 5 TABLET, FILM COATED ORAL at 08:58

## 2019-01-26 RX ADMIN — LINEZOLID 600 MG: 600 INJECTION, SOLUTION INTRAVENOUS at 04:54

## 2019-01-26 RX ADMIN — IPRATROPIUM BROMIDE AND ALBUTEROL SULFATE 3 ML: 2.5; .5 SOLUTION RESPIRATORY (INHALATION) at 21:32

## 2019-01-26 RX ADMIN — METOPROLOL TARTRATE 12.5 MG: 25 TABLET, FILM COATED ORAL at 21:16

## 2019-01-26 RX ADMIN — METOPROLOL TARTRATE 12.5 MG: 25 TABLET, FILM COATED ORAL at 08:58

## 2019-01-26 RX ADMIN — MEROPENEM 1 G: 1 INJECTION, POWDER, FOR SOLUTION INTRAVENOUS at 14:37

## 2019-01-26 RX ADMIN — MAGNESIUM SULFATE HEPTAHYDRATE 4 G: 40 INJECTION, SOLUTION INTRAVENOUS at 12:15

## 2019-01-26 RX ADMIN — MEROPENEM 1 G: 1 INJECTION, POWDER, FOR SOLUTION INTRAVENOUS at 06:43

## 2019-01-26 RX ADMIN — OXYCODONE HYDROCHLORIDE 5 MG: 5 TABLET ORAL at 21:37

## 2019-01-26 RX ADMIN — ACETAMINOPHEN 500 MG: 500 TABLET, FILM COATED ORAL at 04:54

## 2019-01-26 RX ADMIN — TERAZOSIN HYDROCHLORIDE 2 MG: 2 CAPSULE ORAL at 21:16

## 2019-01-27 LAB
ANION GAP SERPL CALCULATED.3IONS-SCNC: 7 MMOL/L (ref 3–11)
BUN BLD-MCNC: 15 MG/DL (ref 9–23)
BUN/CREAT SERPL: 16.1 (ref 7–25)
CALCIUM SPEC-SCNC: 8.1 MG/DL (ref 8.7–10.4)
CHLORIDE SERPL-SCNC: 102 MMOL/L (ref 99–109)
CO2 SERPL-SCNC: 24 MMOL/L (ref 20–31)
CREAT BLD-MCNC: 0.93 MG/DL (ref 0.6–1.3)
DEPRECATED RDW RBC AUTO: 56.3 FL (ref 37–54)
ERYTHROCYTE [DISTWIDTH] IN BLOOD BY AUTOMATED COUNT: 16.8 % (ref 11.3–14.5)
GFR SERPL CREATININE-BSD FRML MDRD: 80 ML/MIN/1.73
GLUCOSE BLD-MCNC: 178 MG/DL (ref 70–100)
GLUCOSE BLDC GLUCOMTR-MCNC: 115 MG/DL (ref 70–130)
GLUCOSE BLDC GLUCOMTR-MCNC: 149 MG/DL (ref 70–130)
GLUCOSE BLDC GLUCOMTR-MCNC: 167 MG/DL (ref 70–130)
GLUCOSE BLDC GLUCOMTR-MCNC: 172 MG/DL (ref 70–130)
HCT VFR BLD AUTO: 28.4 % (ref 38.9–50.9)
HGB BLD-MCNC: 9.4 G/DL (ref 13.1–17.5)
MAGNESIUM SERPL-MCNC: 2 MG/DL (ref 1.3–2.7)
MCH RBC QN AUTO: 30.1 PG (ref 27–31)
MCHC RBC AUTO-ENTMCNC: 33.1 G/DL (ref 32–36)
MCV RBC AUTO: 91 FL (ref 80–99)
PLATELET # BLD AUTO: 292 10*3/MM3 (ref 150–450)
PMV BLD AUTO: 8.4 FL (ref 6–12)
POTASSIUM BLD-SCNC: 4.3 MMOL/L (ref 3.5–5.5)
RBC # BLD AUTO: 3.12 10*6/MM3 (ref 4.2–5.76)
SODIUM BLD-SCNC: 133 MMOL/L (ref 132–146)
WBC NRBC COR # BLD: 4.83 10*3/MM3 (ref 3.5–10.8)

## 2019-01-27 PROCEDURE — 80048 BASIC METABOLIC PNL TOTAL CA: CPT | Performed by: INTERNAL MEDICINE

## 2019-01-27 PROCEDURE — 97116 GAIT TRAINING THERAPY: CPT

## 2019-01-27 PROCEDURE — 25010000002 MEROPENEM: Performed by: INTERNAL MEDICINE

## 2019-01-27 PROCEDURE — 94799 UNLISTED PULMONARY SVC/PX: CPT

## 2019-01-27 PROCEDURE — 82962 GLUCOSE BLOOD TEST: CPT

## 2019-01-27 PROCEDURE — 97162 PT EVAL MOD COMPLEX 30 MIN: CPT

## 2019-01-27 PROCEDURE — 99232 SBSQ HOSP IP/OBS MODERATE 35: CPT | Performed by: INTERNAL MEDICINE

## 2019-01-27 PROCEDURE — 85027 COMPLETE CBC AUTOMATED: CPT | Performed by: INTERNAL MEDICINE

## 2019-01-27 PROCEDURE — 25010000002 DAPTOMYCIN PER 1 MG: Performed by: INTERNAL MEDICINE

## 2019-01-27 PROCEDURE — 83735 ASSAY OF MAGNESIUM: CPT | Performed by: INTERNAL MEDICINE

## 2019-01-27 RX ADMIN — INSULIN LISPRO 2 UNITS: 100 INJECTION, SOLUTION INTRAVENOUS; SUBCUTANEOUS at 08:50

## 2019-01-27 RX ADMIN — LANSOPRAZOLE 30 MG: KIT at 06:35

## 2019-01-27 RX ADMIN — MEROPENEM 1 G: 1 INJECTION, POWDER, FOR SOLUTION INTRAVENOUS at 23:33

## 2019-01-27 RX ADMIN — APIXABAN 5 MG: 5 TABLET, FILM COATED ORAL at 08:50

## 2019-01-27 RX ADMIN — Medication 1 CAPSULE: at 08:50

## 2019-01-27 RX ADMIN — METOPROLOL TARTRATE 12.5 MG: 25 TABLET, FILM COATED ORAL at 20:12

## 2019-01-27 RX ADMIN — DAPTOMYCIN 550 MG: 500 INJECTION, POWDER, LYOPHILIZED, FOR SOLUTION INTRAVENOUS at 08:50

## 2019-01-27 RX ADMIN — FLECAINIDE ACETATE 50 MG: 50 TABLET ORAL at 20:12

## 2019-01-27 RX ADMIN — Medication 1 PACKET: at 20:11

## 2019-01-27 RX ADMIN — MICAFUNGIN SODIUM 100 MG: 20 INJECTION, POWDER, LYOPHILIZED, FOR SOLUTION INTRAVENOUS at 17:43

## 2019-01-27 RX ADMIN — METOPROLOL TARTRATE 12.5 MG: 25 TABLET, FILM COATED ORAL at 08:50

## 2019-01-27 RX ADMIN — IPRATROPIUM BROMIDE AND ALBUTEROL SULFATE 3 ML: 2.5; .5 SOLUTION RESPIRATORY (INHALATION) at 21:41

## 2019-01-27 RX ADMIN — MEROPENEM 1 G: 1 INJECTION, POWDER, FOR SOLUTION INTRAVENOUS at 15:15

## 2019-01-27 RX ADMIN — ACETAMINOPHEN 500 MG: 500 TABLET, FILM COATED ORAL at 06:35

## 2019-01-27 RX ADMIN — HYDROCODONE BITARTRATE AND ACETAMINOPHEN 1 TABLET: 5; 325 TABLET ORAL at 05:12

## 2019-01-27 RX ADMIN — INSULIN LISPRO 2 UNITS: 100 INJECTION, SOLUTION INTRAVENOUS; SUBCUTANEOUS at 20:13

## 2019-01-27 RX ADMIN — FLECAINIDE ACETATE 50 MG: 50 TABLET ORAL at 08:50

## 2019-01-27 RX ADMIN — Medication 1 PACKET: at 12:14

## 2019-01-27 RX ADMIN — ALLOPURINOL 100 MG: 100 TABLET ORAL at 08:50

## 2019-01-27 RX ADMIN — APIXABAN 5 MG: 5 TABLET, FILM COATED ORAL at 20:11

## 2019-01-27 RX ADMIN — TERAZOSIN HYDROCHLORIDE 2 MG: 2 CAPSULE ORAL at 20:12

## 2019-01-27 RX ADMIN — MEROPENEM 1 G: 1 INJECTION, POWDER, FOR SOLUTION INTRAVENOUS at 06:35

## 2019-01-28 LAB
BACTERIA FLD CULT: ABNORMAL
GLUCOSE BLDC GLUCOMTR-MCNC: 125 MG/DL (ref 70–130)
GLUCOSE BLDC GLUCOMTR-MCNC: 138 MG/DL (ref 70–130)
GLUCOSE BLDC GLUCOMTR-MCNC: 144 MG/DL (ref 70–130)
GLUCOSE BLDC GLUCOMTR-MCNC: 169 MG/DL (ref 70–130)
GRAM STN SPEC: ABNORMAL
GRAM STN SPEC: ABNORMAL

## 2019-01-28 PROCEDURE — 63710000001 INSULIN LISPRO (HUMAN) PER 5 UNITS: Performed by: HOSPITALIST

## 2019-01-28 PROCEDURE — 94640 AIRWAY INHALATION TREATMENT: CPT

## 2019-01-28 PROCEDURE — 97110 THERAPEUTIC EXERCISES: CPT

## 2019-01-28 PROCEDURE — 82962 GLUCOSE BLOOD TEST: CPT

## 2019-01-28 PROCEDURE — 25010000002 DAPTOMYCIN PER 1 MG: Performed by: INTERNAL MEDICINE

## 2019-01-28 PROCEDURE — 99232 SBSQ HOSP IP/OBS MODERATE 35: CPT | Performed by: INTERNAL MEDICINE

## 2019-01-28 PROCEDURE — 25010000002 MEROPENEM: Performed by: INTERNAL MEDICINE

## 2019-01-28 PROCEDURE — 97116 GAIT TRAINING THERAPY: CPT

## 2019-01-28 RX ADMIN — FLECAINIDE ACETATE 50 MG: 50 TABLET ORAL at 08:59

## 2019-01-28 RX ADMIN — Medication 1 CAPSULE: at 08:59

## 2019-01-28 RX ADMIN — METOPROLOL TARTRATE 12.5 MG: 25 TABLET, FILM COATED ORAL at 21:10

## 2019-01-28 RX ADMIN — Medication 1 PACKET: at 21:16

## 2019-01-28 RX ADMIN — LANSOPRAZOLE 30 MG: KIT at 06:54

## 2019-01-28 RX ADMIN — ALLOPURINOL 100 MG: 100 TABLET ORAL at 08:59

## 2019-01-28 RX ADMIN — FLECAINIDE ACETATE 50 MG: 50 TABLET ORAL at 21:11

## 2019-01-28 RX ADMIN — MEROPENEM 1 G: 1 INJECTION, POWDER, FOR SOLUTION INTRAVENOUS at 21:12

## 2019-01-28 RX ADMIN — MICAFUNGIN SODIUM 100 MG: 20 INJECTION, POWDER, LYOPHILIZED, FOR SOLUTION INTRAVENOUS at 17:12

## 2019-01-28 RX ADMIN — TERAZOSIN HYDROCHLORIDE 2 MG: 2 CAPSULE ORAL at 21:11

## 2019-01-28 RX ADMIN — ACETAMINOPHEN 500 MG: 500 TABLET, FILM COATED ORAL at 00:02

## 2019-01-28 RX ADMIN — DAPTOMYCIN 550 MG: 500 INJECTION, POWDER, LYOPHILIZED, FOR SOLUTION INTRAVENOUS at 08:59

## 2019-01-28 RX ADMIN — MEROPENEM 1 G: 1 INJECTION, POWDER, FOR SOLUTION INTRAVENOUS at 06:54

## 2019-01-28 RX ADMIN — METOPROLOL TARTRATE 12.5 MG: 25 TABLET, FILM COATED ORAL at 08:59

## 2019-01-28 RX ADMIN — Medication 1 PACKET: at 09:00

## 2019-01-28 RX ADMIN — INSULIN LISPRO 2 UNITS: 100 INJECTION, SOLUTION INTRAVENOUS; SUBCUTANEOUS at 08:58

## 2019-01-28 RX ADMIN — MEROPENEM 1 G: 1 INJECTION, POWDER, FOR SOLUTION INTRAVENOUS at 14:12

## 2019-01-28 RX ADMIN — APIXABAN 5 MG: 5 TABLET, FILM COATED ORAL at 21:11

## 2019-01-28 RX ADMIN — APIXABAN 5 MG: 5 TABLET, FILM COATED ORAL at 08:59

## 2019-01-28 RX ADMIN — IPRATROPIUM BROMIDE AND ALBUTEROL SULFATE 3 ML: 2.5; .5 SOLUTION RESPIRATORY (INHALATION) at 11:04

## 2019-01-29 LAB
ANION GAP SERPL CALCULATED.3IONS-SCNC: 6 MMOL/L (ref 3–11)
BUN BLD-MCNC: 13 MG/DL (ref 9–23)
BUN/CREAT SERPL: 15.7 (ref 7–25)
CALCIUM SPEC-SCNC: 8.4 MG/DL (ref 8.7–10.4)
CHLORIDE SERPL-SCNC: 104 MMOL/L (ref 99–109)
CO2 SERPL-SCNC: 24 MMOL/L (ref 20–31)
CREAT BLD-MCNC: 0.83 MG/DL (ref 0.6–1.3)
DEPRECATED RDW RBC AUTO: 55.8 FL (ref 37–54)
ERYTHROCYTE [DISTWIDTH] IN BLOOD BY AUTOMATED COUNT: 16.7 % (ref 11.3–14.5)
GFR SERPL CREATININE-BSD FRML MDRD: 92 ML/MIN/1.73
GLUCOSE BLD-MCNC: 160 MG/DL (ref 70–100)
GLUCOSE BLDC GLUCOMTR-MCNC: 141 MG/DL (ref 70–130)
GLUCOSE BLDC GLUCOMTR-MCNC: 143 MG/DL (ref 70–130)
GLUCOSE BLDC GLUCOMTR-MCNC: 154 MG/DL (ref 70–130)
GLUCOSE BLDC GLUCOMTR-MCNC: 179 MG/DL (ref 70–130)
HCT VFR BLD AUTO: 31.5 % (ref 38.9–50.9)
HGB BLD-MCNC: 10.1 G/DL (ref 13.1–17.5)
MCH RBC QN AUTO: 29.3 PG (ref 27–31)
MCHC RBC AUTO-ENTMCNC: 32.1 G/DL (ref 32–36)
MCV RBC AUTO: 91.3 FL (ref 80–99)
PLATELET # BLD AUTO: 278 10*3/MM3 (ref 150–450)
PMV BLD AUTO: 9 FL (ref 6–12)
POTASSIUM BLD-SCNC: 4.2 MMOL/L (ref 3.5–5.5)
RBC # BLD AUTO: 3.45 10*6/MM3 (ref 4.2–5.76)
SODIUM BLD-SCNC: 134 MMOL/L (ref 132–146)
WBC NRBC COR # BLD: 6.69 10*3/MM3 (ref 3.5–10.8)

## 2019-01-29 PROCEDURE — 92526 ORAL FUNCTION THERAPY: CPT

## 2019-01-29 PROCEDURE — 92507 TX SP LANG VOICE COMM INDIV: CPT

## 2019-01-29 PROCEDURE — 25010000002 MEROPENEM: Performed by: INTERNAL MEDICINE

## 2019-01-29 PROCEDURE — 82962 GLUCOSE BLOOD TEST: CPT

## 2019-01-29 PROCEDURE — 25010000002 DAPTOMYCIN PER 1 MG: Performed by: INTERNAL MEDICINE

## 2019-01-29 PROCEDURE — 85027 COMPLETE CBC AUTOMATED: CPT | Performed by: INTERNAL MEDICINE

## 2019-01-29 PROCEDURE — 99233 SBSQ HOSP IP/OBS HIGH 50: CPT | Performed by: INTERNAL MEDICINE

## 2019-01-29 PROCEDURE — 80048 BASIC METABOLIC PNL TOTAL CA: CPT | Performed by: INTERNAL MEDICINE

## 2019-01-29 PROCEDURE — 63710000001 INSULIN LISPRO (HUMAN) PER 5 UNITS: Performed by: HOSPITALIST

## 2019-01-29 RX ORDER — FLUOXETINE HYDROCHLORIDE 20 MG/1
20 CAPSULE ORAL EVERY 12 HOURS SCHEDULED
Status: DISCONTINUED | OUTPATIENT
Start: 2019-01-29 | End: 2019-02-01 | Stop reason: HOSPADM

## 2019-01-29 RX ADMIN — METOPROLOL TARTRATE 12.5 MG: 25 TABLET, FILM COATED ORAL at 08:38

## 2019-01-29 RX ADMIN — TERAZOSIN HYDROCHLORIDE 2 MG: 2 CAPSULE ORAL at 22:11

## 2019-01-29 RX ADMIN — APIXABAN 5 MG: 5 TABLET, FILM COATED ORAL at 22:11

## 2019-01-29 RX ADMIN — MEROPENEM 1 G: 1 INJECTION, POWDER, FOR SOLUTION INTRAVENOUS at 13:41

## 2019-01-29 RX ADMIN — SODIUM CHLORIDE, PRESERVATIVE FREE 3 ML: 5 INJECTION INTRAVENOUS at 22:12

## 2019-01-29 RX ADMIN — MEROPENEM 1 G: 1 INJECTION, POWDER, FOR SOLUTION INTRAVENOUS at 06:23

## 2019-01-29 RX ADMIN — FLECAINIDE ACETATE 50 MG: 50 TABLET ORAL at 08:38

## 2019-01-29 RX ADMIN — INSULIN LISPRO 2 UNITS: 100 INJECTION, SOLUTION INTRAVENOUS; SUBCUTANEOUS at 08:38

## 2019-01-29 RX ADMIN — ALLOPURINOL 100 MG: 100 TABLET ORAL at 08:38

## 2019-01-29 RX ADMIN — MICAFUNGIN SODIUM 100 MG: 20 INJECTION, POWDER, LYOPHILIZED, FOR SOLUTION INTRAVENOUS at 17:31

## 2019-01-29 RX ADMIN — METOPROLOL TARTRATE 12.5 MG: 25 TABLET, FILM COATED ORAL at 22:11

## 2019-01-29 RX ADMIN — Medication 1 CAPSULE: at 08:38

## 2019-01-29 RX ADMIN — Medication 1 PACKET: at 22:12

## 2019-01-29 RX ADMIN — DAPTOMYCIN 550 MG: 500 INJECTION, POWDER, LYOPHILIZED, FOR SOLUTION INTRAVENOUS at 09:50

## 2019-01-29 RX ADMIN — LANSOPRAZOLE 30 MG: KIT at 06:23

## 2019-01-29 RX ADMIN — SODIUM CHLORIDE, PRESERVATIVE FREE 10 ML: 5 INJECTION INTRAVENOUS at 08:39

## 2019-01-29 RX ADMIN — Medication 1 PACKET: at 08:51

## 2019-01-29 RX ADMIN — MEROPENEM 1 G: 1 INJECTION, POWDER, FOR SOLUTION INTRAVENOUS at 22:11

## 2019-01-29 RX ADMIN — APIXABAN 5 MG: 5 TABLET, FILM COATED ORAL at 08:39

## 2019-01-29 RX ADMIN — FLECAINIDE ACETATE 50 MG: 50 TABLET ORAL at 22:10

## 2019-01-29 RX ADMIN — FLUOXETINE HYDROCHLORIDE 20 MG: 20 CAPSULE ORAL at 22:11

## 2019-01-30 LAB
AMYLASE FLD-CCNC: >4500 U/L
ANION GAP SERPL CALCULATED.3IONS-SCNC: 7 MMOL/L (ref 3–11)
BUN BLD-MCNC: 13 MG/DL (ref 9–23)
BUN/CREAT SERPL: 15.7 (ref 7–25)
CALCIUM SPEC-SCNC: 8.3 MG/DL (ref 8.7–10.4)
CHLORIDE SERPL-SCNC: 106 MMOL/L (ref 99–109)
CO2 SERPL-SCNC: 21 MMOL/L (ref 20–31)
CREAT BLD-MCNC: 0.83 MG/DL (ref 0.6–1.3)
DEPRECATED RDW RBC AUTO: 57.7 FL (ref 37–54)
ERYTHROCYTE [DISTWIDTH] IN BLOOD BY AUTOMATED COUNT: 16.9 % (ref 11.3–14.5)
GFR SERPL CREATININE-BSD FRML MDRD: 92 ML/MIN/1.73
GLUCOSE BLD-MCNC: 163 MG/DL (ref 70–100)
GLUCOSE BLDC GLUCOMTR-MCNC: 127 MG/DL (ref 70–130)
GLUCOSE BLDC GLUCOMTR-MCNC: 135 MG/DL (ref 70–130)
GLUCOSE BLDC GLUCOMTR-MCNC: 149 MG/DL (ref 70–130)
GLUCOSE BLDC GLUCOMTR-MCNC: 165 MG/DL (ref 70–130)
HCT VFR BLD AUTO: 32.3 % (ref 38.9–50.9)
HGB BLD-MCNC: 10.3 G/DL (ref 13.1–17.5)
MCH RBC QN AUTO: 29.6 PG (ref 27–31)
MCHC RBC AUTO-ENTMCNC: 31.9 G/DL (ref 32–36)
MCV RBC AUTO: 92.8 FL (ref 80–99)
PLATELET # BLD AUTO: 261 10*3/MM3 (ref 150–450)
PMV BLD AUTO: 8.6 FL (ref 6–12)
POTASSIUM BLD-SCNC: 4.1 MMOL/L (ref 3.5–5.5)
RBC # BLD AUTO: 3.48 10*6/MM3 (ref 4.2–5.76)
SODIUM BLD-SCNC: 134 MMOL/L (ref 132–146)
WBC NRBC COR # BLD: 7.15 10*3/MM3 (ref 3.5–10.8)

## 2019-01-30 PROCEDURE — 25010000002 ERTAPENEM PER 500 MG: Performed by: INTERNAL MEDICINE

## 2019-01-30 PROCEDURE — 97110 THERAPEUTIC EXERCISES: CPT

## 2019-01-30 PROCEDURE — 82150 ASSAY OF AMYLASE: CPT | Performed by: SURGERY

## 2019-01-30 PROCEDURE — 82962 GLUCOSE BLOOD TEST: CPT

## 2019-01-30 PROCEDURE — 85027 COMPLETE CBC AUTOMATED: CPT | Performed by: INTERNAL MEDICINE

## 2019-01-30 PROCEDURE — 97116 GAIT TRAINING THERAPY: CPT

## 2019-01-30 PROCEDURE — 25010000002 DAPTOMYCIN PER 1 MG: Performed by: INTERNAL MEDICINE

## 2019-01-30 PROCEDURE — 94799 UNLISTED PULMONARY SVC/PX: CPT

## 2019-01-30 PROCEDURE — 80048 BASIC METABOLIC PNL TOTAL CA: CPT | Performed by: INTERNAL MEDICINE

## 2019-01-30 PROCEDURE — 99233 SBSQ HOSP IP/OBS HIGH 50: CPT | Performed by: INTERNAL MEDICINE

## 2019-01-30 PROCEDURE — 25010000002 MEROPENEM: Performed by: INTERNAL MEDICINE

## 2019-01-30 RX ADMIN — ERTAPENEM SODIUM 1 G: 1 INJECTION, POWDER, LYOPHILIZED, FOR SOLUTION INTRAMUSCULAR; INTRAVENOUS at 10:50

## 2019-01-30 RX ADMIN — SODIUM CHLORIDE, PRESERVATIVE FREE 3 ML: 5 INJECTION INTRAVENOUS at 09:43

## 2019-01-30 RX ADMIN — INSULIN LISPRO 2 UNITS: 100 INJECTION, SOLUTION INTRAVENOUS; SUBCUTANEOUS at 09:58

## 2019-01-30 RX ADMIN — Medication 1 PACKET: at 20:55

## 2019-01-30 RX ADMIN — FLUOXETINE HYDROCHLORIDE 20 MG: 20 CAPSULE ORAL at 09:43

## 2019-01-30 RX ADMIN — Medication 1 PACKET: at 09:46

## 2019-01-30 RX ADMIN — Medication 5 MG: at 20:55

## 2019-01-30 RX ADMIN — TERAZOSIN HYDROCHLORIDE 2 MG: 2 CAPSULE ORAL at 20:55

## 2019-01-30 RX ADMIN — LANSOPRAZOLE 30 MG: KIT at 06:10

## 2019-01-30 RX ADMIN — ALLOPURINOL 100 MG: 100 TABLET ORAL at 09:43

## 2019-01-30 RX ADMIN — IPRATROPIUM BROMIDE AND ALBUTEROL SULFATE 3 ML: 2.5; .5 SOLUTION RESPIRATORY (INHALATION) at 20:14

## 2019-01-30 RX ADMIN — DAPTOMYCIN 550 MG: 500 INJECTION, POWDER, LYOPHILIZED, FOR SOLUTION INTRAVENOUS at 09:42

## 2019-01-30 RX ADMIN — FLUOXETINE HYDROCHLORIDE 20 MG: 20 CAPSULE ORAL at 20:55

## 2019-01-30 RX ADMIN — FLECAINIDE ACETATE 50 MG: 50 TABLET ORAL at 09:43

## 2019-01-30 RX ADMIN — METOPROLOL TARTRATE 12.5 MG: 25 TABLET, FILM COATED ORAL at 20:55

## 2019-01-30 RX ADMIN — MICAFUNGIN SODIUM 100 MG: 20 INJECTION, POWDER, LYOPHILIZED, FOR SOLUTION INTRAVENOUS at 17:56

## 2019-01-30 RX ADMIN — APIXABAN 5 MG: 5 TABLET, FILM COATED ORAL at 09:43

## 2019-01-30 RX ADMIN — SODIUM CHLORIDE, PRESERVATIVE FREE 3 ML: 5 INJECTION INTRAVENOUS at 20:55

## 2019-01-30 RX ADMIN — METOPROLOL TARTRATE 12.5 MG: 25 TABLET, FILM COATED ORAL at 09:43

## 2019-01-30 RX ADMIN — APIXABAN 5 MG: 5 TABLET, FILM COATED ORAL at 20:55

## 2019-01-30 RX ADMIN — Medication 1 CAPSULE: at 09:43

## 2019-01-30 RX ADMIN — OXYCODONE HYDROCHLORIDE 5 MG: 5 TABLET ORAL at 22:14

## 2019-01-30 RX ADMIN — MEROPENEM 1 G: 1 INJECTION, POWDER, FOR SOLUTION INTRAVENOUS at 06:10

## 2019-01-30 RX ADMIN — FLECAINIDE ACETATE 50 MG: 50 TABLET ORAL at 20:55

## 2019-01-31 LAB
FERRITIN SERPL-MCNC: 471 NG/ML (ref 22–322)
GLUCOSE BLDC GLUCOMTR-MCNC: 142 MG/DL (ref 70–130)
GLUCOSE BLDC GLUCOMTR-MCNC: 143 MG/DL (ref 70–130)
GLUCOSE BLDC GLUCOMTR-MCNC: 147 MG/DL (ref 70–130)
GLUCOSE BLDC GLUCOMTR-MCNC: 152 MG/DL (ref 70–130)
IRON 24H UR-MRATE: 12 MCG/DL (ref 50–175)
IRON SATN MFR SERPL: 6 % (ref 20–50)
TIBC SERPL-MCNC: 206 MCG/DL (ref 250–450)

## 2019-01-31 PROCEDURE — 83540 ASSAY OF IRON: CPT | Performed by: HOSPITALIST

## 2019-01-31 PROCEDURE — 94799 UNLISTED PULMONARY SVC/PX: CPT

## 2019-01-31 PROCEDURE — 25010000002 DAPTOMYCIN PER 1 MG: Performed by: INTERNAL MEDICINE

## 2019-01-31 PROCEDURE — 83550 IRON BINDING TEST: CPT | Performed by: HOSPITALIST

## 2019-01-31 PROCEDURE — 82962 GLUCOSE BLOOD TEST: CPT

## 2019-01-31 PROCEDURE — 82728 ASSAY OF FERRITIN: CPT | Performed by: HOSPITALIST

## 2019-01-31 PROCEDURE — 92526 ORAL FUNCTION THERAPY: CPT

## 2019-01-31 PROCEDURE — 25010000002 ERTAPENEM PER 500 MG: Performed by: INTERNAL MEDICINE

## 2019-01-31 PROCEDURE — 99232 SBSQ HOSP IP/OBS MODERATE 35: CPT | Performed by: HOSPITALIST

## 2019-01-31 RX ORDER — PRAMIPEXOLE DIHYDROCHLORIDE 0.25 MG/1
0.12 TABLET ORAL NIGHTLY PRN
Status: DISCONTINUED | OUTPATIENT
Start: 2019-01-31 | End: 2019-02-01 | Stop reason: HOSPADM

## 2019-01-31 RX ADMIN — ALLOPURINOL 100 MG: 100 TABLET ORAL at 09:13

## 2019-01-31 RX ADMIN — DAPTOMYCIN 550 MG: 500 INJECTION, POWDER, LYOPHILIZED, FOR SOLUTION INTRAVENOUS at 09:12

## 2019-01-31 RX ADMIN — ERTAPENEM SODIUM 1 G: 1 INJECTION, POWDER, LYOPHILIZED, FOR SOLUTION INTRAMUSCULAR; INTRAVENOUS at 09:12

## 2019-01-31 RX ADMIN — FLECAINIDE ACETATE 50 MG: 50 TABLET ORAL at 09:15

## 2019-01-31 RX ADMIN — TERAZOSIN HYDROCHLORIDE 2 MG: 2 CAPSULE ORAL at 21:48

## 2019-01-31 RX ADMIN — Medication 1 CAPSULE: at 09:13

## 2019-01-31 RX ADMIN — LANSOPRAZOLE 30 MG: KIT at 06:14

## 2019-01-31 RX ADMIN — METOPROLOL TARTRATE 12.5 MG: 25 TABLET, FILM COATED ORAL at 09:13

## 2019-01-31 RX ADMIN — Medication 5 MG: at 21:24

## 2019-01-31 RX ADMIN — FLUOXETINE HYDROCHLORIDE 20 MG: 20 CAPSULE ORAL at 21:25

## 2019-01-31 RX ADMIN — APIXABAN 5 MG: 5 TABLET, FILM COATED ORAL at 21:25

## 2019-01-31 RX ADMIN — APIXABAN 5 MG: 5 TABLET, FILM COATED ORAL at 09:13

## 2019-01-31 RX ADMIN — SODIUM CHLORIDE, PRESERVATIVE FREE 3 ML: 5 INJECTION INTRAVENOUS at 21:25

## 2019-01-31 RX ADMIN — MICAFUNGIN SODIUM 100 MG: 20 INJECTION, POWDER, LYOPHILIZED, FOR SOLUTION INTRAVENOUS at 18:38

## 2019-01-31 RX ADMIN — FLECAINIDE ACETATE 50 MG: 50 TABLET ORAL at 21:25

## 2019-01-31 RX ADMIN — INSULIN LISPRO 2 UNITS: 100 INJECTION, SOLUTION INTRAVENOUS; SUBCUTANEOUS at 09:13

## 2019-01-31 RX ADMIN — IPRATROPIUM BROMIDE AND ALBUTEROL SULFATE 3 ML: 2.5; .5 SOLUTION RESPIRATORY (INHALATION) at 13:38

## 2019-01-31 RX ADMIN — FLUOXETINE HYDROCHLORIDE 20 MG: 20 CAPSULE ORAL at 09:13

## 2019-01-31 RX ADMIN — METOPROLOL TARTRATE 12.5 MG: 25 TABLET, FILM COATED ORAL at 21:24

## 2019-02-01 VITALS
HEIGHT: 72 IN | RESPIRATION RATE: 16 BRPM | OXYGEN SATURATION: 97 % | HEART RATE: 96 BPM | SYSTOLIC BLOOD PRESSURE: 145 MMHG | WEIGHT: 202.2 LBS | DIASTOLIC BLOOD PRESSURE: 90 MMHG | TEMPERATURE: 98.2 F | BODY MASS INDEX: 27.39 KG/M2

## 2019-02-01 LAB
ALBUMIN SERPL-MCNC: 2.77 G/DL (ref 3.2–4.8)
ALBUMIN/GLOB SERPL: 1 G/DL (ref 1.5–2.5)
ALP SERPL-CCNC: 132 U/L (ref 25–100)
ALT SERPL W P-5'-P-CCNC: 25 U/L (ref 7–40)
ANION GAP SERPL CALCULATED.3IONS-SCNC: 2 MMOL/L (ref 3–11)
AST SERPL-CCNC: 28 U/L (ref 0–33)
BACTERIA SPEC ANAEROBE CULT: NORMAL
BILIRUB SERPL-MCNC: 0.4 MG/DL (ref 0.3–1.2)
BUN BLD-MCNC: 16 MG/DL (ref 9–23)
BUN/CREAT SERPL: 19.5 (ref 7–25)
CALCIUM SPEC-SCNC: 8.3 MG/DL (ref 8.7–10.4)
CHLORIDE SERPL-SCNC: 103 MMOL/L (ref 99–109)
CK SERPL-CCNC: 24 U/L (ref 26–174)
CO2 SERPL-SCNC: 29 MMOL/L (ref 20–31)
CREAT BLD-MCNC: 0.82 MG/DL (ref 0.6–1.3)
DEPRECATED RDW RBC AUTO: 57.2 FL (ref 37–54)
ERYTHROCYTE [DISTWIDTH] IN BLOOD BY AUTOMATED COUNT: 17.1 % (ref 11.3–14.5)
GFR SERPL CREATININE-BSD FRML MDRD: 93 ML/MIN/1.73
GLOBULIN UR ELPH-MCNC: 2.7 GM/DL
GLUCOSE BLD-MCNC: 132 MG/DL (ref 70–100)
GLUCOSE BLDC GLUCOMTR-MCNC: 148 MG/DL (ref 70–130)
GLUCOSE BLDC GLUCOMTR-MCNC: 154 MG/DL (ref 70–130)
HCT VFR BLD AUTO: 27.8 % (ref 38.9–50.9)
HGB BLD-MCNC: 9 G/DL (ref 13.1–17.5)
MAGNESIUM SERPL-MCNC: 1.8 MG/DL (ref 1.3–2.7)
MCH RBC QN AUTO: 29.6 PG (ref 27–31)
MCHC RBC AUTO-ENTMCNC: 32.4 G/DL (ref 32–36)
MCV RBC AUTO: 91.4 FL (ref 80–99)
PLATELET # BLD AUTO: 293 10*3/MM3 (ref 150–450)
PMV BLD AUTO: 8.8 FL (ref 6–12)
POTASSIUM BLD-SCNC: 4.1 MMOL/L (ref 3.5–5.5)
PROT SERPL-MCNC: 5.5 G/DL (ref 5.7–8.2)
RBC # BLD AUTO: 3.04 10*6/MM3 (ref 4.2–5.76)
SODIUM BLD-SCNC: 134 MMOL/L (ref 132–146)
WBC NRBC COR # BLD: 6.86 10*3/MM3 (ref 3.5–10.8)

## 2019-02-01 PROCEDURE — 85027 COMPLETE CBC AUTOMATED: CPT | Performed by: INTERNAL MEDICINE

## 2019-02-01 PROCEDURE — 92507 TX SP LANG VOICE COMM INDIV: CPT

## 2019-02-01 PROCEDURE — 82550 ASSAY OF CK (CPK): CPT | Performed by: INTERNAL MEDICINE

## 2019-02-01 PROCEDURE — 80053 COMPREHEN METABOLIC PANEL: CPT | Performed by: INTERNAL MEDICINE

## 2019-02-01 PROCEDURE — 99239 HOSP IP/OBS DSCHRG MGMT >30: CPT | Performed by: HOSPITALIST

## 2019-02-01 PROCEDURE — 25010000002 ERTAPENEM PER 500 MG: Performed by: INTERNAL MEDICINE

## 2019-02-01 PROCEDURE — 92526 ORAL FUNCTION THERAPY: CPT

## 2019-02-01 PROCEDURE — 82962 GLUCOSE BLOOD TEST: CPT

## 2019-02-01 PROCEDURE — 25010000002 DAPTOMYCIN PER 1 MG: Performed by: INTERNAL MEDICINE

## 2019-02-01 PROCEDURE — 83735 ASSAY OF MAGNESIUM: CPT

## 2019-02-01 RX ORDER — LANSOPRAZOLE 30 MG/1
30 CAPSULE, DELAYED RELEASE ORAL DAILY
COMMUNITY
End: 2019-03-26

## 2019-02-01 RX ORDER — L.ACID,PARA/B.BIFIDUM/S.THERM 8B CELL
1 CAPSULE ORAL DAILY
Qty: 30 CAPSULE | Refills: 0 | Status: SHIPPED | OUTPATIENT
Start: 2019-02-02 | End: 2019-09-05 | Stop reason: HOSPADM

## 2019-02-01 RX ORDER — TERAZOSIN 2 MG/1
2 CAPSULE ORAL NIGHTLY
Qty: 30 CAPSULE | Refills: 0 | Status: SHIPPED | OUTPATIENT
Start: 2019-02-01 | End: 2019-03-26

## 2019-02-01 RX ADMIN — FLUOXETINE HYDROCHLORIDE 20 MG: 20 CAPSULE ORAL at 09:16

## 2019-02-01 RX ADMIN — Medication 1 CAPSULE: at 09:16

## 2019-02-01 RX ADMIN — FLECAINIDE ACETATE 50 MG: 50 TABLET ORAL at 09:16

## 2019-02-01 RX ADMIN — MICAFUNGIN SODIUM 100 MG: 20 INJECTION, POWDER, LYOPHILIZED, FOR SOLUTION INTRAVENOUS at 12:07

## 2019-02-01 RX ADMIN — ERTAPENEM SODIUM 1 G: 1 INJECTION, POWDER, LYOPHILIZED, FOR SOLUTION INTRAMUSCULAR; INTRAVENOUS at 09:13

## 2019-02-01 RX ADMIN — DAPTOMYCIN 550 MG: 500 INJECTION, POWDER, LYOPHILIZED, FOR SOLUTION INTRAVENOUS at 09:13

## 2019-02-01 RX ADMIN — APIXABAN 5 MG: 5 TABLET, FILM COATED ORAL at 09:16

## 2019-02-01 RX ADMIN — METOPROLOL TARTRATE 12.5 MG: 25 TABLET, FILM COATED ORAL at 09:16

## 2019-02-01 RX ADMIN — LANSOPRAZOLE 30 MG: KIT at 06:22

## 2019-02-01 RX ADMIN — ALLOPURINOL 100 MG: 100 TABLET ORAL at 09:16

## 2019-02-01 NOTE — PROGRESS NOTES
Case Management Discharge Note    Final Note: Home with Aranza Nugent for home infusion and DME    Destination      No service has been selected for the patient.      Durable Medical Equipment - Selection Complete      Service Provider Request Status Selected Services Address Phone Number Fax Number    [facility] AMBROSE Selected Durable Medical Equipment [address] [place] [address] [phone]      Dialysis/Infusion      Service Provider Request Status Selected Services Address Phone Number Fax Number    ARANZA Murillo OLRNE AMBR[facility] [address] [facility] [address] [phone]       Tamara Luna RN 1/30/2019 1510    For IV daptomycin, Invanz, & mycamine                 Home Medical Care - Selection Complete      Service Provider Request Status Selected Services Address Phone Number Fax Number    [facility] Selected Home Health Services [address] [place] [address] [phone] --      Community Resources      No service has been selected for the patient.             Final Discharge Disposition Code: 06 - home with home health care

## 2019-02-01 NOTE — PROGRESS NOTES
"General Surgery Daily Progress Note    01/31/19    Todd Phillips  [id number]  1950    * No surgery found *    Reason for Evaluation: Locally advanced pancreatic cancer  Subjective:  8 well today.  Continued slow and steady improvement    Objective:  /92 (BP Location: Right arm, Patient Position: Lying)   Pulse 94   Temp 98.6 °F (37 °C) (Oral)   Resp 20   Ht 182.9 cm (72\")   Wt 91.7 kg (202 lb 3.2 oz)   SpO2 96%   BMI 27.42 kg/m²       INTAKE/OUTPUT      Intake/Output Summary (Last 24 hours) at 1/31/2019 2019  Last data filed at 1/31/2019 1838  Gross per 24 hour   Intake 250 ml   Output 160 ml   Net 90 ml       General Appearance: No acute distress  Eyes: Anicteric  Neck: Trachea midline   Cardiovascular:  RRR  Lungs:  Bilateral respirations unlabored   Abdomen:  Benign  Extremities:  No cyanosis or edema   Skin:  No jaundice  Neurologic: awake and conversant   Surgical Site: Serous drain output      Imaging Results (last 24 hours)     ** No results found for the last 24 hours. **          Labs:  Lab Results (last 24 hours)     Procedure Component Value Units Date/Time    POC Glucose Once [[id number]]  (Abnormal) Collected:  01/31/19 1633    Specimen:  Blood Updated:  01/31/19 1638     Glucose 142 mg/dL     Iron Profile [[id number]]  (Abnormal) Collected:  01/31/19 1342    Specimen:  Blood Updated:  01/31/19 1435     Iron 12 mcg/dL      TIBC 206 mcg/dL      Iron Saturation 6 %     Ferritin [[id number]]  (Abnormal) Collected:  01/31/19 1342    Specimen:  Blood Updated:  01/31/19 1435     Ferritin 471.00 ng/mL     POC Glucose Once [[id number]]  (Abnormal) Collected:  01/31/19 1121    Specimen:  Blood Updated:  01/31/19 1139     Glucose 147 mg/dL     POC Glucose Once [[id number]]  (Abnormal) Collected:  01/31/19 0735    Specimen:  Blood Updated:  01/31/19 0736     Glucose 152 mg/dL             Assessment:  Failure to thrive after Whipple's resection with associated intra-abdominal abscess.  Patient has had " some dramatic improvement over the past week.    Plan:   Completely agree with discharge tomorrow.  He will go home with his drain.  He requires drain teaching with recording daily outputs and stripping his drain 2-3 times a day as needed.  He can flush his G-tube with 30 mL of water 3 times a day as well.  His G-tube can be used for bolus enteral supplements if necessary.  Although he appears to be eating well and made better on his home diet.  He will return to see me a week from Wednesday    Miquel Brody MD - 1/31/2019, 8:19 PM

## 2019-02-01 NOTE — PROGRESS NOTES
[facility] Progress Note        Antibiotics:  Anti-Infectives (From admission, onward)    Ordered     Dose/Rate Route Frequency Start Stop    01/30/19 0841  ertapenem (INVanz) 1 g/100 mL 0.9% NS VTB (mbp)     Ordering Provider:  Scot Higgins MD    1 g  over 30 Minutes Intravenous Every 24 Hours 01/30/19 0945 02/09/19 0944    01/26/19 0740  DAPTOmycin (CUBICIN) 550 mg in sodium chloride 0.9 % 50 mL IVPB     Ordering Provider:  Scot Higgins MD    6 mg/kg × 91.7 kg  100 mL/hr over 30 Minutes Intravenous Every 24 Hours 01/26/19 0900 02/09/19 0859    01/21/19 1511  micafungin 100 mg/100 mL 0.9% NS IVPB (mbp)     Scot Higgins MD reviewed the order on 01/29/19 0719.   Ordering Provider:  Scot Higgins MD    100 mg  over 60 Minutes Intravenous Every 24 Hours 01/21/19 1700 02/08/19 0718    01/21/19 1511  meropenem (MERREM) 1 g/100 mL 0.9% NS VTB (mbp)     Ordering Provider:  Scot Higgins MD    1 g  over 30 Minutes Intravenous Once 01/21/19 1600 01/21/19 1718          CC: fatigue    HPI:    Patient is a 68 y.o.  Yr old male with history of biliary tract obstruction, admitted to [facility] multiple times in the last 2 months including August 10 until August 16, underwent cholecystectomy with pathology suggesting chronic cholecystitis, had ERCP on August 14 with biliary stricture/proximal duct dilation and had stent placement.  He was readmitted August 21, 2018 until August 25, 2018 with acute sepsis, Klebsiella bacteremia, discharged with oral antibiotics.  Readmitted September 12, 2018 until September 17, 2018 with ESBL Escherichia coli bacteremia seen by infectious disease in [place] and treated with Invanz, duration of therapy unclear but approximately until September 24.  During that period of time, concern for malignancy mentioned in notes and referred to [facility] for further biopsy, done September 28.  Presented once again to [facility] emergency room  September 30, 2018 with acute onset abdominal pain/nausea/vomiting.  Blood cultures positive again with  Klebsiella species and  Transferred to [facility]. 10/1/18 ERCP with evidence for obstruction/purulence and new stent placed; He was treated with rocephin/flagyl and last seen by us early October; after that, he had more definitive diagnosis of pancreatic cancer and treated with neoadjuvant chemotherapy and right chest port placement.  In addition he required TPN.  Family not aware of any other specific microbiologic diagnosis since October; he was admitted December 31 and underwent pancreaticoduodenectomy, wedge biopsy of liver tumor and portal vein resection/lateral repair with gastrojejunostomy tube placement.  Moved to ICU on January 5 with concern regarding increased confusion/Tachypnea, abnormal CT scan with pneumatosis/ileus and portal venous gas in addition to intra-abdominal fluid per description of radiology.  Chest x-ray January 6 with low lung volumes and increased markings at lung bases bilaterally but no focal parenchymal opacification per radiology.   1/11 perc drain with 1.8 liters fluid removed per radiology or with VRE/ESBL Escherichia coli.  He was moved to [facility] 1/15 and cared for by Dr. Faye; he apparently went down for head CT January 19 and family said abdominal drain inadvertently removed.  CT scan of the chest on January 20 with bibasilar effusions/consolidation and CT scan of the abdomen with a loculated perihepatic fluid collection containing air concerning for abscess per radiology.  Family requested transfer back to [facility] which was arranged January 21.    1/25 perc drainage     2/1/19 generally stronger and less abd pain per him;    pain dull, nonrad, right side, 1-2/10 and better with pain meds and better on abx with drain;  Wife reports no cough, no chest pain, no new abdominal distention or discomfort and she has noted urinary incontinence  "but no overt hematuria/pyuria.  No rash.  Left arm PICC line and right chest port with no new redness or purulence.  No diarrhea and  no GI bleeding per wife     Otherwise complete review of systems unable to obtain.      ROS:      2/1/19 per nursing, No f/c/s. No n/v/d. No rash. No new ADR to Abx.     Constitutional-- No Fever, chills or sweats.    Heent-- No new vision, hearing or throat complaints.  No epistaxis or oral sores.   CV-- No chest pain, palpitation or syncope  Resp-- No SOB/cough/Hemoptysis  GI- No nausea, vomiting, or diarrhea.    -- No dysuria, hematuria, or flank pain.  Denies hesitancy, urgency or flank pain.  Lymph- no swollen lymph nodes in neck/axilla or groin.   Heme- No active bruising or bleeding; no Hx of DVT or PE.  MS-- no swelling or pain in the bones or joints of arms/legs.  No new back pain.  Neuro-- No acute focal weakness or numbness in the arms or legs.  No seizures.    Otherwise complete review of systems unable to obtain.        PE:   /90 (BP Location: Right arm, Patient Position: Lying)   Pulse 96   Temp 98.2 °F (36.8 °C) (Oral)   Resp 16   Ht 182.9 cm (72\")   Wt 91.7 kg (202 lb 3.2 oz)   SpO2 97%   BMI 27.42 kg/m²       GENERAL: awake, in no acute distress.   HEENT: Normocephalic, atraumatic.  No conjunctival injection. No icterus. Oropharynx clear without evidence of thrush or exudate. No evidence of peridontal disease.    NECK: Supple without nuchal rigidity. No mass.  LYMPH: No cervical, axillary or inguinal lymphadenopathy.  HEART: RRR; No murmur, rubs, gallops.   LUNGS: diminished at bases with bilateral rales, no rhonchi or wheeze. Normal respiratory effort. Nonlabored. No dullness.  ABDOMEN: Soft, tender more on right, nondistended. Positive bowel sounds. No rebound or guarding. NO mass or HSM.  Right abdomen with some drainage from prior drain site.  No discrete mass bulge or fluctuance.  No crepitus or bulla.  No skin necrosis  EXT:  No cyanosis, clubbing " or edema. No cord.  : Genitalia generally unremarkable.  Without Hassan catheter.  MSK: FROM without joint effusions noted arms/legs.    SKIN: Warm and dry without cutaneous eruptions on Inspection/palpation.    NEURO: Not cooperative with motor/sensory exam     Abdominal incision is clean with no redness induration or warmth.  No mass bulge or fluctuance.  No crepitus or bulla.     Feeding tube noted with no redness induration or warmth.  No mass bulge or fluctuance.  No crepitus or bulla        Laboratory Data    Results from last 7 days   Lab Units 02/01/19  0524 01/30/19  0456 01/29/19  0458   WBC 10*3/mm3 6.86 7.15 6.69   HEMOGLOBIN g/dL 9.0* 10.3* 10.1*   HEMATOCRIT % 27.8* 32.3* 31.5*   PLATELETS 10*3/mm3 293 261 278     Results from last 7 days   Lab Units 02/01/19  0524   SODIUM mmol/L 134   POTASSIUM mmol/L 4.1   CHLORIDE mmol/L 103   CO2 mmol/L 29.0   BUN mg/dL 16   CREATININE mg/dL 0.82   GLUCOSE mg/dL 132*   CALCIUM mg/dL 8.3*     Results from last 7 days   Lab Units 02/01/19  0524   ALK PHOS U/L 132*   BILIRUBIN mg/dL 0.4   ALT (SGPT) U/L 25   AST (SGOT) U/L 28               Estimated Creatinine Clearance: 108.7 mL/min (by C-G formula based on SCr of 0.82 mg/dL).      Microbiology:      Radiology:  Imaging Results (last 72 hours)     ** No results found for the last 72 hours. **            Impression:     --Acute  postoperative IA abscess  with recent VRE/ESBL Escherichia coli and +yeast reoprted 1/22 per micro from January 11 aspirate and recent concern for enterocolitis.  Abd CT 1/10 with fluid collection and  aspiration/drain 1/11 with VRE/ESBL E Coli/C [place] as above; drain inadvertently removed January 19 while going to CT scan at outside hospital.  Repeat CT January 20 with fluid collection.  Dr. Brody following determine options/timing/threshold for further drainage.  I have d/w Dr Harrell;  CT  1/24 with improved but persistent collection;  Repeat perc drain 1/25 with GPC suggesting  GPC/enterococcus, despite recent zyvox so changed to daptomycin/merrem/myca 1/26 empirically (from zyvox/merrem/myca) and 1/29 culture confirmed VRE sensitive to daptomycin but not zyvox; 1/30 tapered to daptomycin/invanz/mycamine     --Acute postoperative enterocolitis.  Recent surgery as outlined above in the setting of pancreatic cancer, prior neoadjuvant chemotherapy and tube feeding.       --Acute encephalopathy.  Mental status has waxed/waned over the course of his hospitalization at [facility] and now while in [place].  Medicine to monitor and consider further neurologic workup at their discretion.     --Pancreatic cancer with prior neoadjuvant chemotherapy, indwelling chronic port and recent surgery as outlined above.     --COPD     --Diabetes type 2     --Chronic anticoagulation with history A. fib and past antiarrhythmic therapy     --Urinary incontinence.  U/A ordered with culture if indicated     --Surgical sites, lines and feeding tube without obvious secondary cellulitis at present     --Abnormal CT scan of chest at outside hospital with pleural effusions and description of consolidation with possible compressive atelectasis.  Certainly at risk for aspiration and HCAP, although currently no cough.  Monitor for possible evolution of pneumonia and consider further antibiotic adjustments depending on clinical course/study results etc.        PLAN:     --IV daptomycin/Invanz and Mycamine     --Check/review labs cultures and scans     --History per wife     --Discussed with microbiology     --Partial history per nursing staff    --d/w Dr Avila     --Highly complex set of issues with high risk for further serious morbidity and other serious sequela    --Dr Brody following     **home with daptomycin/Invanz/mycamine  if ok with all others; f/u with me next wed       Scot Higgins MD  2/1/2019

## 2019-02-01 NOTE — PLAN OF CARE
Problem: Patient Care Overview  Goal: Plan of Care Review  Outcome: Ongoing (interventions implemented as appropriate)   02/01/19 0354   Coping/Psychosocial   Plan of Care Reviewed With patient   Plan of Care Review   Progress improving   OTHER   Outcome Summary Pt up in the chair for part of the night, slept well, VSS, plans to D/C today       Problem: Fall Risk (Adult)  Goal: Absence of Fall  Outcome: Ongoing (interventions implemented as appropriate)      Problem: Infection, Risk/Actual (Adult)  Goal: Infection Prevention/Resolution  Outcome: Ongoing (interventions implemented as appropriate)      Problem: Confusion, Acute (Adult)  Goal: Cognitive/Functional Impairments Minimized  Outcome: Ongoing (interventions implemented as appropriate)    Goal: Safety  Outcome: Ongoing (interventions implemented as appropriate)      Problem: Skin Injury Risk (Adult)  Goal: Skin Health and Integrity  Outcome: Ongoing (interventions implemented as appropriate)

## 2019-02-01 NOTE — THERAPY TREATMENT NOTE
Acute Care - Speech Language Pathology   Swallow Progress Note [facility]     Patient Name: Todd Phillips  : 1950  MRN: [id number]  Today's Date: 2019  Onset of Illness/Injury or Date of Surgery: 19     Referring Physician: Dr Brewster      Admit Date: 2019    Visit Dx:      ICD-10-CM ICD-9-CM   1. Pharyngeal dysphagia R13.13 787.23   2. Cognitive communication deficit R41.841 799.52   3. Chronic obstructive pulmonary disease, unspecified COPD type (CMS/HCC) J44.9 496   4. Gout without tophus M10.9 274.9   5. Encephalopathy G93.40 348.30   6. Intra-abdominal abscess (S/P Percutaneous drain) T81.49XA 998.59     567.22   7. Impaired functional mobility, balance, gait, and endurance Z74.09 V49.89   8. Malignant neoplasm of head of pancreas (CMS/[facility]) C25.0 157.0   9. Adult necrotizing enterocolitis (CMS/HCC) K55.30 557.0     Patient Active Problem List   Diagnosis   • Hyperlipidemia   • COPD (chronic obstructive pulmonary disease) (CMS/HCC)   • Essential hypertension   • Gout without tophus   • Anxiety and depression   • Primary insomnia   • Gastroesophageal reflux disease without esophagitis   • Nonobstructive atherosclerosis of coronary artery   • CKD stage 3 secondary to diabetes (CMS/HCC)   • DM2 (diabetes mellitus, type 2) (CMS/HCC)   • Paroxysmal atrial fibrillation   • Chronic anticoagulation, Eliquis   • Encounter for monitoring flecainide therapy   • Malignant neoplasm of head of pancreas (CMS/HCC)   • Bacteremia due to Escherichia coli   • Biliary obstruction   • Thrombocytopenia (CMS/HCC)   • Anemia due to chemotherapy   • Fever   • Post-operative confusion and somnolence, likely due to oversedation   • Atrial fibrillation with RVR (CMS/HCC)   • Adult necrotizing enterocolitis (CMS/HCC)   • Encephalopathy   • Intra-abdominal abscess (S/P Percutaneous drain)   • Metabolic encephalopathy       Therapy Treatment  Rehabilitation Treatment Summary     Row Name 19 1120              Treatment Time/Intention    Discipline  speech language pathologist  -VO      Document Type  therapy note (daily note)  -VO      Subjective Information  no complaints  -VO      Mode of Treatment  speech-language pathology  -VO      Patient/Family Observations  Family present, preparing for d/c  -VO      Care Plan Review  care plan/treatment goals reviewed;risks/benefits reviewed;current/potential barriers reviewed;patient/other agree to care plan  -VO      Care Plan Review, Other Participant(s)  family  -VO      Therapy Frequency (Swallow)  5 days per week  -VO      Therapy Frequency (SLP SLC)  5 days per week  -VO      Patient Effort  good  -VO      Recorded by [VO] Radha Duncan MA,CCC-SLP 02/01/19 1151      Row Name 02/01/19 1120             Pain Scale: Numbers Pre/Post-Treatment    Pain Scale: Numbers, Pretreatment  0/10 - no pain  -VO      Pain Scale: Numbers, Post-Treatment  0/10 - no pain  -VO      Recorded by [VO] Radha Duncan MA,CCC-SLP 02/01/19 1151      Row Name                Wound 01/22/19 Right upper flank    Wound - Properties Group Date first assessed: 01/22/19 [CP] Present On Admission : yes [CP] Side: Right [KW] Orientation: upper [KW] Location: flank [KW] Additional Comments: right lateral chest surgical wound [CP] Recorded by:  [CP] Viktoria Magana APRN 01/22/19 1056 [KW] Naomi Cummins RN 01/21/19 1611    Row Name 02/01/19 1120             Outcome Summary/Treatment Plan (SLP)    Daily Summary of Progress (SLP)  progress toward functional goals as expected  -VO      Plan for Continued Treatment (SLP)  Pt able to explain and demonstrate strategies for swallowing. Completed exercises w/ minimal cues. Preparing for d/c. Answered complex yes/no questions and following multi step commands well.   -VO      Anticipated Dischage Disposition  home with home health;anticipate therapy at next level of care  -VO      Recorded by [VO] Radha Duncan MA,CCC-SLP 02/01/19 1151         User Key  (r) = Recorded By, (t) = Taken By, (c) = Cosigned By    Initials Name Effective Dates Discipline    CP Viktoria Magana APRN 11/05/18 -  Nurse    Naomi Ambrocio RN 06/16/16 -  Nurse    Radha Montoya MA,CCC-SLP 10/24/18 -  SLP          Outcome Summary  Outcome Summary/Treatment Plan (SLP)  Daily Summary of Progress (SLP): progress toward functional goals as expected (02/01/19 1120 : Radha Duncan MA,CCC-SLP)  Plan for Continued Treatment (SLP): Pt able to explain and demonstrate strategies for swallowing. Completed exercises w/ minimal cues. Preparing for d/c. Answered complex yes/no questions and following multi step commands well.  (02/01/19 1120 : Radha Duncan MA,CCC-SLP)  Anticipated Dischage Disposition: home with home health, anticipate therapy at next level of care (02/01/19 1120 : Radha Duncan MA,CCC-SLP)      SLP GOALS     Row Name 02/01/19 1100 01/31/19 1530          Oral Nutrition/Hydration Goal 1 (SLP)    Oral Nutrition/Hydration Goal 1, SLP  LTG: Pt will tolerate recommended diet w/ no overt s/sxs aspiration w/ 100% acc and no cues  -VO  LTG: Pt will tolerate recommended diet w/ no overt s/sxs aspiration w/ 100% acc and no cues  -MP     Time Frame (Oral Nutrition/Hydration Goal 1, SLP)  by discharge  -VO  by discharge  -MP     Progress/Outcomes (Oral Nutrition/Hydration Goal 1, SLP)  goal ongoing  -VO  goal revised this date  -MP        Pharyngeal Strengthening Exercise Goal 1 (SLP)    Activity (Pharyngeal Strengthening Goal 1, SLP)  increase closure at entrance to airway/closure of airway at glottis  -VO  increase closure at entrance to airway/closure of airway at glottis  -MP     Increase Closure at Entrance to Airway/Closure of Airway at Glottis  super-supraglottic swallow  -VO  super-supraglottic swallow  -MP     [place]/Accuracy (Pharyngeal Strengthening Goal 1, SLP)  independently (over 90% accuracy)  -VO  independently (over 90%  accuracy)  -MP     Time Frame (Pharyngeal Strengthening Goal 1, SLP)  short term goal (STG);by discharge  -VO  short term goal (STG);by discharge  -MP     Barriers (Pharyngeal Strengthening Goal 1, SLP)  Completed w/ 70% acc w/ min cues  -VO  Provided demonstration of exercise and handout to use for independent practice  -MP     Progress/Outcomes (Pharyngeal Strengthening Goal 1, SLP)  continuing progress toward goal  -VO  continuing progress toward goal  -MP        Swallow Management Recall Goal 1 (SLP)    Activity (Swallow Management Recall Goal 1, SLP)  independent recall of;recall of;safe diet/liquid level;compensatory swallow strategies/techniques;rationale for use of strategies/techniques  -VO  independent recall of;recall of;safe diet/liquid level;compensatory swallow strategies/techniques;rationale for use of strategies/techniques  -MP     [place]/Accuracy (Swallow Management Recall Goal 1, SLP)  independently (over 90% accuracy)  -VO  independently (over 90% accuracy)  -MP     Time Frame (Swallow Management Recall Goal 1, SLP)  short term goal (STG)  -VO  short term goal (STG)  -MP     Barriers (Swallow Management Recall Goal 1, SLP)  Able to independently recall and implement  -VO  Instructed pt on rationale for compensatory strategy. Demonstrated understanding  -MP     Progress/Outcomes (Swallow Management Recall Goal 1, SLP)  goal met  -VO  continuing progress toward goal  -MP        Swallow Compensatory Strategies Goal 1 (SLP)    Activity (Swallow Compensatory Strategies/Techniques Goal 1, SLP)  compensatory strategies;small straw sips  -VO  compensatory strategies;small straw sips  -MP     [place]/Accuracy (Swallow Compensatory Strategies/Techniques Goal 1, SLP)  independently (over 90% accuracy)  -VO  independently (over 90% accuracy)  -MP     Time Frame (Swallow Compensatory Strategies/Techniques Goal 1, SLP)  short term goal (STG)  -VO  short term goal (STG)  -MP     Progress/Outcomes  (Swallow Compensatory Strategies/Techniques Goal 1, SLP)  goal met  -VO  continuing progress toward goal  -MP        Comprehend Questions Goal 1 (SLP)    Improve Ability to Comprehend Questions Goal 1 (SLP)  complex yes/no questions;complex wh questions;simple general questions;80%;with minimal cues (75-90%)  -VO  complex yes/no questions;complex wh questions;simple general questions;80%;with minimal cues (75-90%)  -MP     Time Frame (Comprehend Questions Goal 1, SLP)  short term goal (STG);by discharge  -VO  short term goal (STG);by discharge  -MP     Progress (Ability to Comprehend Questions Goal 1, SLP)  70%;with minimal cues (75-90%)  -VO  --     Progress/Outcomes (Comprehend Questions Goal 1, SLP)  continuing progress toward goal  -VO  goal ongoing  -MP        Follow Directions Goal 2 (SLP)    Improve Ability to Follow Directions Goal 1 (SLP)  2 step commands;80%;with minimal cues (75-90%)  -VO  2 step commands;80%;with minimal cues (75-90%)  -MP     Time Frame (Follow Directions Goal 1, SLP)  short term goal (STG);by discharge  -VO  short term goal (STG);by discharge  -MP     Progress (Ability to Follow Directions Goal 1, SLP)  70%;with minimal cues (75-90%)  -VO  --     Progress/Outcomes (Follow Directions Goal 1, SLP)  continuing progress toward goal  -VO  goal ongoing  -MP        Attention Goal 1 (SLP)    Improve Attention by Goal 1 (SLP)  looking at speaker  -VO  looking at speaker;80%;with minimal cues (75-90%)  -MP     Time Frame (Attention Goal 1, SLP)  short term goal (STG)  -VO  short term goal (STG);by discharge  -MP     Progress (Attention Goal 1, SLP)  50%;independently (over 90% accuracy)  -VO  --     Progress/Outcomes (Attention Goal 1, SLP)  continuing progress toward goal  -VO  goal ongoing  -MP        Functional Problem Solving Skills Goal 1 (SLP)    Improve Problem Solving Through Goal 1 (SLP)  answer questions about ADL problems;determine solutions to simple ADL/safety problems;determine  solutions to complex problems;80%;with minimal cues (75-90%)  -VO  answer questions about ADL problems;determine solutions to simple ADL/safety problems;determine solutions to complex problems;80%;with minimal cues (75-90%)  -MP     Time Frame (Problem Solving Goal 1, SLP)  short term goal (STG);by discharge  -VO  short term goal (STG);by discharge  -MP     Progress/Outcomes (Problem Solving Goal 1, SLP)  goal ongoing  -VO  goal ongoing  -MP        Additional Goal 1 (SLP)    Additional Goal 1, SLP  LTG: Pt will improve cognitive-communication skills to actively participate in care w/ 100% accuracy w/o cues.   -VO  LTG: Pt will improve cognitive-communication skills to actively participate in care w/ 100% accuracy w/o cues.   -MP     Time Frame (Additional Goal 1, SLP)  by discharge  -VO  by discharge  -MP     Progress/Outcomes (Additional Goal 1, SLP)  goal ongoing  -VO  goal ongoing  -MP       User Key  (r) = Recorded By, (t) = Taken By, (c) = Cosigned By    Initials Name Provider Type    VO Radha Duncan MA,CCC-SLP Speech and Language Pathologist    Miguel Liu MS, CFY-SLP Speech and Language Pathologist          EDUCATION  The patient has been educated in the following areas:   Dysphagia (Swallowing Impairment) Oral Care/Hydration Modified Diet Instruction.    SLP Recommendation and Plan                       Anticipated Dischage Disposition: home with home health, anticipate therapy at next level of care     Therapy Frequency (Swallow): 5 days per week          Plan of Care Reviewed With: patient, family  Plan of Care Review  Plan of Care Reviewed With: patient, family  Daily Summary of Progress (SLP): progress toward functional goals as expected  Plan for Continued Treatment (SLP): Pt able to explain and demonstrate strategies for swallowing. Completed exercises w/ minimal cues. Preparing for d/c. Answered complex yes/no questions and following multi step commands well.            Time  Calculation:   Time Calculation- SLP     Row Name 02/01/19 1154             Time Calculation- SLP    SLP Start Time  1120  -VO      SLP Received On  02/01/19  -VO        User Key  (r) = Recorded By, (t) = Taken By, (c) = Cosigned By    Initials Name Provider Type    VO Radha Duncan MA,CCC-SLP Speech and Language Pathologist          Therapy Charges for Today     Code Description Service Date Service Provider Modifiers Qty    82582905409 HC ST TREATMENT SWALLOW 2 2/1/2019 Radha Duncan MA,CCC-SLP GN 1    45741500504 HC ST TREATMENT SPEECH 1 2/1/2019 Radha Duncan MA,CCC-SLP GN 1                 Radha Duncan MA,SAPNA-SLP  2/1/2019

## 2019-02-01 NOTE — THERAPY DISCHARGE NOTE
Acute Care - Physical Therapy Discharge Summary  [facility]       Patient Name: Todd Phillips  : 1950  MRN: [id number]    Today's Date: 2019  Onset of Illness/Injury or Date of Surgery: 19    Date of Referral to PT: 19  Referring Physician: Dr Brewster      Admit Date: 2019      PT Recommendation and Plan    Visit Dx:    ICD-10-CM ICD-9-CM   1. Pharyngeal dysphagia R13.13 787.23   2. Cognitive communication deficit R41.841 799.52   3. Chronic obstructive pulmonary disease, unspecified COPD type (CMS/[facility]) J44.9 496   4. Gout without tophus M10.9 274.9   5. Encephalopathy G93.40 348.30   6. Intra-abdominal abscess (S/P Percutaneous drain) T81.49XA 998.59     567.22   7. Impaired functional mobility, balance, gait, and endurance Z74.09 V49.89   8. Malignant neoplasm of head of pancreas (CMS/[facility]) C25.0 157.0   9. Adult necrotizing enterocolitis (CMS/HCC) K55.30 557.0       Outcome Measures     Row Name 19 0901             How much help from another person do you currently need...    Turning from your back to your side while in flat bed without using bedrails?  3  -MARC      Moving from lying on back to sitting on the side of a flat bed without bedrails?  3  -MARC      Moving to and from a bed to a chair (including a wheelchair)?  3  -MARC      Standing up from a chair using your arms (e.g., wheelchair, bedside chair)?  3  -MARC      Climbing 3-5 steps with a railing?  3  -MARC      To walk in hospital room?  3  -MARC      AM-PAC 6 Clicks Score  18  -MARC         Functional Assessment    Outcome Measure Options  AM-PAC 6 Clicks Basic Mobility (PT)  -MARC        User Key  (r) = Recorded By, (t) = Taken By, (c) = Cosigned By    Initials Name Provider Type    Margarita Bermudez, PT Physical Therapist            Therapy Suggested Charges     Code   Minutes Charges    70861 (CPT®) Hc Pt Neuromusc Re Education Ea 15 Min      34956 (CPT®) Hc Pt Ther Proc Ea 15 Min 9 1    55713 (CPT®) Hc Gait Training Ea 15 Min  8 1    23292 (CPT®) Hc Pt Therapeutic Act Ea 15 Min 6     74840 (CPT®) Hc Pt Manual Therapy Ea 15 Min      09342 (CPT®) Hc Pt Iontophoresis Ea 15 Min      15061 (CPT®) Hc Pt Elec Stim Ea-Per 15 Min      47830 (CPT®) Hc Pt Ultrasound Ea 15 Min      05136 (CPT®) Hc Pt Self Care/Mgmt/Train Ea 15 Min      51757 (CPT®) Hc Pt Prosthetic (S) Train Initial Encounter, Each 15 Min      31945 (CPT®) Hc Pt Orthotic(S)/Prosthetic(S) Encounter, Each 15 Min      18535 (CPT®) Hc Orthotic(S) Mgmt/Train Initial Encounter, Each 15min      Total  23 2          Rehab Goal Summary     Row Name 02/01/19 1440 02/01/19 1100          Physical Therapy Goals    Bed Mobility Goal Selection (PT)  bed mobility, PT goal 1  -VG  --     Transfer Goal Selection (PT)  transfer, PT goal 1  -VG  --     Gait Training Goal Selection (PT)  gait training, PT goal 1  -VG  --        Bed Mobility Goal 1 (PT)    Activity/Assistive Device (Bed Mobility Goal 1, PT)  bed mobility activities, all  -VG  --     [place] Level/Cues Needed (Bed Mobility Goal 1, PT)  independent  -VG  --     Time Frame (Bed Mobility Goal 1, PT)  10 days  -VG  --     Progress/Outcomes (Bed Mobility Goal 1, PT)  goal partially met;discharged from facility  -VG  --        Transfer Goal 1 (PT)    Activity/Assistive Device (Transfer Goal 1, PT)  sit-to-stand/stand-to-sit;bed-to-chair/chair-to-bed;walker, rolling  -VG  --     [place] Level/Cues Needed (Transfer Goal 1, PT)  standby assist  -VG  --     Time Frame (Transfer Goal 1, PT)  10 days  -VG  --     Progress/Outcome (Transfer Goal 1, PT)  goal partially met;discharged from facility  -VG  --        Gait Training Goal 1 (PT)    Activity/Assistive Device (Gait Training Goal 1, PT)  gait (walking locomotion);walker, rolling  -VG  --     [place] Level (Gait Training Goal 1, PT)  standby assist  -VG  --     Distance (Gait Goal 1, PT)  100  -VG  --     Time Frame (Gait Training Goal 1, PT)  10 days  -VG  --     Progress/Outcome  (Gait Training Goal 1, PT)  goal partially met;discharged from facility  -VG  --        Oral Nutrition/Hydration Goal 1 (SLP)    Oral Nutrition/Hydration Goal 1, SLP  --  LTG: Pt will tolerate recommended diet w/ no overt s/sxs aspiration w/ 100% acc and no cues  -VO     Time Frame (Oral Nutrition/Hydration Goal 1, SLP)  --  by discharge  -VO     Progress/Outcomes (Oral Nutrition/Hydration Goal 1, SLP)  --  goal ongoing  -VO        Pharyngeal Strengthening Exercise Goal 1 (SLP)    Activity (Pharyngeal Strengthening Goal 1, SLP)  --  increase closure at entrance to airway/closure of airway at glottis  -VO     Increase Closure at Entrance to Airway/Closure of Airway at Glottis  --  super-supraglottic swallow  -VO     [place]/Accuracy (Pharyngeal Strengthening Goal 1, SLP)  --  independently (over 90% accuracy)  -VO     Time Frame (Pharyngeal Strengthening Goal 1, SLP)  --  short term goal (STG);by discharge  -VO     Barriers (Pharyngeal Strengthening Goal 1, SLP)  --  Completed w/ 70% acc w/ min cues  -VO     Progress/Outcomes (Pharyngeal Strengthening Goal 1, SLP)  --  continuing progress toward goal  -VO        Swallow Management Recall Goal 1 (SLP)    Activity (Swallow Management Recall Goal 1, SLP)  --  independent recall of;recall of;safe diet/liquid level;compensatory swallow strategies/techniques;rationale for use of strategies/techniques  -VO     [place]/Accuracy (Swallow Management Recall Goal 1, SLP)  --  independently (over 90% accuracy)  -VO     Time Frame (Swallow Management Recall Goal 1, SLP)  --  short term goal (STG)  -VO     Barriers (Swallow Management Recall Goal 1, SLP)  --  Able to independently recall and implement  -VO     Progress/Outcomes (Swallow Management Recall Goal 1, SLP)  --  goal met  -VO        Swallow Compensatory Strategies Goal 1 (SLP)    Activity (Swallow Compensatory Strategies/Techniques Goal 1, SLP)  --  compensatory strategies;small straw sips  -VO      [place]/Accuracy (Swallow Compensatory Strategies/Techniques Goal 1, SLP)  --  independently (over 90% accuracy)  -VO     Time Frame (Swallow Compensatory Strategies/Techniques Goal 1, SLP)  --  short term goal (STG)  -VO     Progress/Outcomes (Swallow Compensatory Strategies/Techniques Goal 1, SLP)  --  goal met  -VO        Comprehend Questions Goal 1 (SLP)    Improve Ability to Comprehend Questions Goal 1 (SLP)  --  complex yes/no questions;complex wh questions;simple general questions;80%;with minimal cues (75-90%)  -VO     Time Frame (Comprehend Questions Goal 1, SLP)  --  short term goal (STG);by discharge  -VO     Progress (Ability to Comprehend Questions Goal 1, SLP)  --  70%;with minimal cues (75-90%)  -VO     Progress/Outcomes (Comprehend Questions Goal 1, SLP)  --  continuing progress toward goal  -VO        Follow Directions Goal 2 (SLP)    Improve Ability to Follow Directions Goal 1 (SLP)  --  2 step commands;80%;with minimal cues (75-90%)  -VO     Time Frame (Follow Directions Goal 1, SLP)  --  short term goal (STG);by discharge  -VO     Progress (Ability to Follow Directions Goal 1, SLP)  --  70%;with minimal cues (75-90%)  -VO     Progress/Outcomes (Follow Directions Goal 1, SLP)  --  continuing progress toward goal  -VO        Attention Goal 1 (SLP)    Improve Attention by Goal 1 (SLP)  --  looking at speaker  -VO     Time Frame (Attention Goal 1, SLP)  --  short term goal (STG)  -VO     Progress (Attention Goal 1, SLP)  --  50%;independently (over 90% accuracy)  -VO     Progress/Outcomes (Attention Goal 1, SLP)  --  continuing progress toward goal  -VO        Functional Problem Solving Skills Goal 1 (SLP)    Improve Problem Solving Through Goal 1 (SLP)  --  answer questions about ADL problems;determine solutions to simple ADL/safety problems;determine solutions to complex problems;80%;with minimal cues (75-90%)  -VO     Time Frame (Problem Solving Goal 1, SLP)  --  short term goal (STG);by  discharge  -VO     Progress/Outcomes (Problem Solving Goal 1, SLP)  --  goal ongoing  -VO        Additional Goal 1 (SLP)    Additional Goal 1, SLP  --  LTG: Pt will improve cognitive-communication skills to actively participate in care w/ 100% accuracy w/o cues.   -VO     Time Frame (Additional Goal 1, SLP)  --  by discharge  -VO     Progress/Outcomes (Additional Goal 1, SLP)  --  goal ongoing  -VO       User Key  (r) = Recorded By, (t) = Taken By, (c) = Cosigned By    Initials Name Provider Type Discipline    VO Radha Duncan MA,CCC-SLP Speech and Language Pathologist SLP    Maribeth Campa, PT Physical Therapist PT              PT Discharge Summary  Anticipated Discharge Disposition (PT): home with assist, home with home health  Reason for Discharge: Discharge from facility  Outcomes Achieved: Patient able to partially acheive established goals  Discharge Destination: Home with assist, Home with home health      Ciera Lynn, PT   2/1/2019

## 2019-02-01 NOTE — DISCHARGE SUMMARY
[facility] Medicine Services  DISCHARGE SUMMARY    Patient Name: Todd Phillips  : 1950  MRN: [id number]    Date of Admission: 2019  Date of Discharge:  19    Primary Care Physician: Adiel Denney MD  Consulting Surgeon: Dr. Miquel Brody  Consulting Infectious Disease Specialist: Dr. Scot Higgins       Consults     Date and Time Order Name Status Description    2019 1542 Inpatient Neurology Consult General Completed     2019 1523 Inpatient General Surgery Consult Completed     2019 1407 Inpatient Infectious Diseases Consult Completed     2019 0030 Inpatient Infectious Diseases Consult Completed           Hospital Course     Presenting Problem:   Altered mental status, unspecified [R41.82]    Active Hospital Problems    Diagnosis Date Noted   • **Metabolic encephalopathy [G93.41] 2019   • Intra-abdominal abscess (S/P Percutaneous drain) [T81.49XA] 01/15/2019   • Malignant neoplasm of head of pancreas (CMS/HCC) [C25.0] 2018   • CKD stage 3 secondary to diabetes (CMS/[facility]) [E11.22, N18.3] 10/22/2017   • DM2 (diabetes mellitus, type 2) (CMS/[facility]) [E11.9] 10/22/2017   • Paroxysmal atrial fibrillation [I48.91] 10/22/2017   • COPD (chronic obstructive pulmonary disease) (CMS/[facility]) [J44.9] 2016   • Essential hypertension [I10] 2016   • Hyperlipidemia [E78.5] 2016   • Gout without tophus [M10.9] 2016      Resolved Hospital Problems   No resolved problems to display.      Hospital Course:  Mr. Phillips is a 68 year-old M with a past medical hx of T2DM, CKD III, HTN, HLP, anxiety/depression, GERD, CAD, pancreatic cancer and paroxysmal atrial fibrillation on eliquis who was admitted on  from the [facility] with metabolic encephalopathy, in the setting of inadvertent removal of drain on  that had been placed on  for intra-abdominal abscess. Repeat drainage was done with cultures growing  multiple pathogens. This is in the setting of a recent Whipple on 12/31 with complicated post-op course and necrotizing enterocolitis.      The cause of patient's Metabolic encephalopathy was likely the acute infection as well as MS Contin which was discontinued.  His mental state and neurologic status normalized prior to discharge.    Patient's body fluid cultures have grown vancomycin-resistant enterococcus, Candida glabrata, and ESBL positive Escherichia coli.  A PICC line was placed in his left basilic vein and he'll be discharged on outpatient IV antibiotics with daptomycin, micafungin, and ertapenem per the direction and supervision of Dr. Scot Higgins.  Dr. Higgins will follow-up with the patient on February 6.  Dr. Higgins has coordinated antibiotic prescribing and management through home health at time of discharge.  As treatment of patient's intra-abdominal abscess,he underwent a CT-guided drain on 1/25.  A Dinh-Crowley drain remains in place and he'll be taught home care of this prior to discharge.  He will follow-up with Dr. Brody on 2/13 for consideration of removal of drain at that time.    Patient has been relying on nocturnal tube feeds through his gastrojejunostomy tube however, during this admission he was able to take in a considerable amount of nutritional supplements and oral nutrition and thus further tube feeding was discontinued at time of discharge.  I've asked the  to arrange for the dietitian with his home health company to follow-up and ensure that his nutrition is adequate.    During his hospitalization, patient did have issues with bilateral ulnar neuropathy, likely due to prolonged sedentary state and compression at elbows.  He also had bilateral lower extremity neuropathy with restless legs.  We discussed potential medical treatment and supportive treatment for these and he would like to monitor his symptoms upon return home and discuss further with his primary care  physician in follow-up.    With respect to the patient's pancreatic cancer,he has an upcoming outpatient follow-up with Dr. Mcdonald in [place].      Further details of medical care provided below in problem list format:     Metabolic Encephalopathy, resolved  - likely due to relapsed infection. MS-Contin also discontinued as it could have played role      RUQ Intra-abdominal Abscess, improved  Postoperative Necrotizing Enterocolitis   - Body Fluid cxs with VRE, Candida Glabrata, ESBL E. Coli   - Continue MADDY drain, s/p CT-guided drainage 1/25. Remove MADDY when output less than 30mL per 24 hr period  - Home on IV dapto/micafungin/invanz through LUE dual lumen PICC. Dr. Higgins to follow up next Wednesday, he will coordinate duration of abx therapy      Pancreatic Cancer  S/p Pancreaticoduodenectomy, Wedge Bx of Liver Tumor, Portal Vein Resection/Lateral Repair with GJ Tube Placement on 12/31 by Dr. Brody  - s/p neoadjuvant chemo, Port in place  - Appreciate Dr. Brody's assistance, he will follow up with pt on 2/13   - Follow up with medical oncologist in [place] upon dc      Bilateral Ulnar Neuropathy, probable   - compressive neuropathy due to prolonged illness/bedbound state is best explanation for his current symptoms. We discussed using pillows/supports to offload elbow at home     Bilateral Foot Neuropathy, ? Restless Legs Syndrome  - iron sufficient  - Pt to monitor symptoms at home and discuss pharmacotherapy with PCP     Acute on Chronic Anemia   - Iron sufficient, Hgb has been stable      Hyponatremia, resolved     Paroxysmal Atrial FIbrillation, on eliquis, flecainide, ChadsVasc 5  Type 2 DM, continue low dose SSI at discharge   CKD III, renal function has been stable with Cr 0.8 since admission  Hypoalbuminemia, nutritional supplements at dc    ELSY on CPAP, pt compliant with CPAP and will continue at discharge   Gout, on allopurinol  COPD without exacerbation      Discharge Follow Up Recommendations for  labs/diagnostics:  1. Follow up with PCP in 1 week.  2. Follow up with Dr. Brody on 2/13.  3. Follow up with Dr. Higgins on 2/6.   4. Pt should measure his drain output daily and keep a diary. The drain needs to be stripped 2-3 times daily and as needed.      Day of Discharge     HPI:   F/u intra-abdominal abscess    Pt resting comfortably in chair. Bilateral arm and leg neuropathy improved today. No fevers or chills. In good spirits, eating well, ready to go home. Wife and daughter-in-law at bedside.     Review of Systems  Otherwise ROS is negative except as mentioned in the HPI.    Vital Signs:   Temp:  [98.1 °F (36.7 °C)-98.6 °F (37 °C)] 98.2 °F (36.8 °C)  Heart Rate:  [82-96] 96  Resp:  [16-20] 16  BP: (145-155)/(89-92) 145/90     Physical Exam:  Constitutional: No acute distress, awake, alert  HENT: NCAT, mucous membranes moist  Respiratory: Clear to auscultation bilaterally, respiratory effort normal   Cardiovascular: RRR, no murmurs, rubs, or gallops, palpable pedal pulses bilaterally, LUE PICC line  Gastrointestinal: Positive bowel sounds, soft, nontender, nondistended, RUQ MADDY drain, left-sided GJ tube  Musculoskeletal: No bilateral ankle edema  Psychiatric: Appropriate affect, cooperative  Neurologic: Oriented x 3, strength symmetric in all extremities, Cranial Nerves grossly intact to confrontation, speech clear  Skin: No rashes      Pertinent  and/or Most Recent Results     Results from last 7 days   Lab Units 02/01/19  0524 01/30/19  0456 01/29/19  0458 01/27/19  0631 01/27/19  0521 01/26/19  0449   WBC 10*3/mm3 6.86 7.15 6.69  --  4.83 3.72   HEMOGLOBIN g/dL 9.0* 10.3* 10.1*  --  9.4* 9.2*   HEMATOCRIT % 27.8* 32.3* 31.5*  --  28.4* 28.4*   PLATELETS 10*3/mm3 293 261 278  --  292 303   SODIUM mmol/L 134 134 134 133  --  131*   POTASSIUM mmol/L 4.1 4.1 4.2 4.3  --  3.6   CHLORIDE mmol/L 103 106 104 102  --  101   CO2 mmol/L 29.0 21.0 24.0 24.0  --  25.0   BUN mg/dL 16 13 13 15  --  20   CREATININE  mg/dL 0.82 0.83 0.83 0.93  --  0.91   GLUCOSE mg/dL 132* 163* 160* 178*  --  156*   CALCIUM mg/dL 8.3* 8.3* 8.4* 8.1*  --  8.0*     Results from last 7 days   Lab Units 02/01/19  0524 01/26/19  0449   BILIRUBIN mg/dL 0.4 0.3   ALK PHOS U/L 132* 132*   ALT (SGPT) U/L 25 17   AST (SGOT) U/L 28 20           Invalid input(s): TG, LDLCALC, LDLREALC  Results from last 7 days   Lab Units 01/26/19  0449   BNP pg/mL 50.0       Brief Urine Lab Results  (Last result in the past 365 days)      Color   Clarity   Blood   Leuk Est   Nitrite   Protein   CREAT   Urine HCG        01/22/19 0416 Yellow Clear Negative Negative Negative Negative               Microbiology Results Abnormal     Procedure Component Value - Date/Time    Fungus Culture - Body Fluid, Peritoneum [[id number]]  (Abnormal) Collected:  01/25/19 1605    Lab Status:  Preliminary result Specimen:  Body Fluid from Peritoneum Updated:  01/30/19 1427     Fungus Culture Scant growth (1+) Candida glabrata    Body Fluid Culture - Body Fluid, Peritoneum [[id number]]  (Abnormal)  (Susceptibility) Collected:  01/25/19 1605    Lab Status:  Final result Specimen:  Body Fluid from Peritoneum Updated:  01/28/19 1347     BF Culture Heavy growth (4+) Enterococcus faecium, VRE     Comment:   Vancomycin Resistant Enterococcus species. Patient may be an isolation risk.        Gram Stain Occasional Gram positive cocci      Many (4+) WBCs per low power field    Narrative:       DRAINAGE PERITONEAL    Susceptibility      Enterococcus faecium, VRE     IRMA     Ampicillin Resistant     Daptomycin Susceptible     Gentamicin High Level Synergy Susceptible     Linezolid Resistant     Penicillin G Resistant     Streptomycin High Level Synergy Susceptible     Vancomycin Resistant                    Clostridium Difficile Toxin - Stool, Per Rectum [[id number]] Collected:  01/26/19 1924    Lab Status:  Final result Specimen:  Stool from Per Rectum Updated:  01/26/19 2114    Narrative:       The  following orders were created for panel order Clostridium Difficile Toxin - Stool, Per Rectum.  Procedure                               Abnormality         Status                     ---------                               -----------         ------                     Clostridium Difficile To...[[id number]]  Normal              Final result                 Please view results for these tests on the individual orders.    Clostridium Difficile Toxin, PCR - Stool, Per Rectum [[id number]]  (Normal) Collected:  01/26/19 1924    Lab Status:  Final result Specimen:  Stool from Per Rectum Updated:  01/26/19 2114     C. Difficile Toxins by PCR Not Detected    Narrative:       Performance characteristics of test not established for patients <2 years of age.  Negative for Toxigenic C. Difficile    Blood Culture - Blood, Hand, Right [[id number]] Collected:  01/21/19 1528    Lab Status:  Final result Specimen:  Blood from Hand, Right Updated:  01/26/19 1645     Blood Culture No growth at 5 days    Narrative:       Aerobic only    Blood Culture - Blood, Arm, Right [[id number]] Collected:  01/21/19 1526    Lab Status:  Final result Specimen:  Blood from Arm, Right Updated:  01/26/19 1645     Blood Culture No growth at 5 days    Narrative:       Aerobic only    Anaerobic Culture - Body Fluid, Peritoneum [[id number]] Collected:  01/25/19 1605    Lab Status:  Preliminary result Specimen:  Body Fluid from Peritoneum Updated:  01/26/19 1321     Culture No anaerobes isolated          Imaging Results (all)     Procedure Component Value Units Date/Time    CT Guided Abscess Drain Peritoneal [[id number]] Collected:  01/25/19 1725     Updated:  01/28/19 1023    Narrative:       EXAMINATION: CT-GUIDED ABSCESS DRAIN PERITONEAL - 1/25/2019      INDICATION: R13.13-Dysphagia, pharyngeal phase; R41.841-Cognitive  communication deficit; J44.9-Chronic obstructive pulmonary disease,  unspecified; M10.9-Gout, unspecified;  "G93.40-Encephalopathy,  unspecified; T81.49XA-Infection following a procedure, other surgical  site, initial encounter.     TECHNIQUE: Limited low-dose 5 mm axial images for guidance of right  upper quadrant abscess drain placement.     The radiation dose reduction device was turned on for each scan per the  ALARA (As Low as Reasonably Achievable) protocol.     COMPARISON: Abdominal CT scan 1/24/2018.     FINDINGS: By history, the patient's right upper quadrant drain was  inadvertently pulled out while the patient was at an outside facility.  The right subcapsular collection is much smaller than on the previous  study, appearing in 2 loculations which are possibly interconnected  tenuously, as seen on axial image #19 series #3 of the previous scan.      Informed written consent was obtained prior to beginning. I discussed  the procedure in detail with the patient including potential risks of  bleeding, infection and needle damage to lung liver.  Mr. Phillips  indicates understanding and agrees to proceed with CT-guided right upper  quadrant abscess drain placement.      The potential lateral route is very close to the pleural space.  Potential anterior route would allow for significant margin away from  pleural space and also the potential to \"fish\" the guidewire and  catheter into the dependent portion of the collection, placing the wire  at an oblique angle to the abscess.     The overlying skin was prepped and draped in sterile fashion and 1%  lidocaine infiltrated into the skin and subcutaneous tissues and down to  the abscess with a 1-1/2-inch 25-gauge needle. Subsequently, an 18-gauge  Chiba needle was gradually advanced up to the margin of the abscess and  careful puncture of the abscess was performed with return of thin  cloudy, slightly milky pinkish fluid. Position of the needle tip within  the abscess was confirmed and a short floppy-tip guidewire gradually  advanced into the collection and gently guided " along the right lateral  margin of the abscess under CT guidance. Position was confirmed in the  dependent portion of the abscess, the needle removed, and an 8.5 Dominican  nonlocking multi-sidehole catheter advanced over the guidewire about as  far as possible allowing sufficient slack for fixation.  The position  was confirmed  in the independent portion of abscess. Guidewire was  removed, a two-way stopcock was attached and approximately 200 cc of  thin dull pink slightly milky fluid was withdrawn. When no more fluid  could be obtained, the patient was rescanned showing all portions of the  abscess including the subdiaphragmatic portion to be  much smaller, with  small remaining collection. Catheter was attached to the skin with a  single 0 silk stitch and stay fix-type catheter attachment device, and a  MADDY bulb was attached, functioning normally at the procedure. There is no  evidence of hematoma or other complication. Mr. Phillips tolerated the  procedure very well.       Impression:       CT-guided 8.5 Dominican nonlocking catheter placement to right  upper quadrant abscess, with removal of 200 cc of thin slightly milky  pinkish-tinged fluid. Samples were sent for labs previously ordered.  Very small residual collection. No complications.     DICTATED:   1/25/2019  EDITED/ls :   1/25/2019         This report was finalized on 1/28/2019 10:21 AM by DR. Darrell Remy MD.       CT Abdomen Pelvis Without Contrast [[id number]] Collected:  01/24/19 1326     Updated:  01/24/19 1458    Narrative:       EXAMINATION: CT ABDOMEN AND PELVIS WO CONTRAST-01/24/2019:      INDICATION: Abdominal abscess; R13.13-Dysphagia, pharyngeal phase;  R41.841-Cognitive communication deficit.     TECHNIQUE:  Axial CT data of the abdomen and pelvis were obtained  helically without IV contrast.  Multiplanar reformatted images were  generated and reviewed.      The radiation dose reduction device was turned on for each scan per the  ALARA (As Low as  Reasonably Achievable) protocol.     COMPARISONS:  01/10/2019.     FINDINGS:  The percutaneous drain placed on 01/11/2019 and the  perihepatic abscess has been removed. There is persistent loculated  perihepatic fluid although this has markedly improved since prior to  drain placement; the collection did measure 8 x 22 x 9 cm before  drainage as one large collection and now is split into two possibly  separate smaller collections, the more posterior of the two measuring 2  x 10 x 2 cm and the more superior 4 x 6 x 3 cm (the collections may be  narrowly connected).     Percutaneous gastrojejunostomy is in place; status post Whipple. There  is a small amount of low density abdominopelvic free fluid. There is gas  in the nondependent portion of the urinary bladder, probably from recent  instrumentation. There is no evidence of bowel obstruction. Only a tiny  locule of free air persists in the mid abdomen deep to the midline  incision, improved. The kidneys, adrenal glands, pancreas and spleen are  unchanged.     There is mild body wall edema and subcutaneous gas from recent  medication injections. Small volume right and trace volume left pleural  effusions. There is near complete collapse of the right lower lobe but  there is only subsegmental atelectasis at the left lung base. No  pertinent osseous abnormality is seen.       Impression:       1. Marked improvement in the perihepatic subcapsular abscess. Drainage  catheter has been removed.  2. Unchanged low density free fluid. Unchanged small volume right and  trace left pleural effusions.     D:  01/24/2019  E:  01/24/2019        This report was finalized on 1/24/2019 2:56 PM by Nilay Andrews.       FL Video Swallow With Speech [[id number]] Collected:  01/23/19 1523     Updated:  01/23/19 1638    Narrative:       EXAMINATION: FL VIDEO SWALLOW W SPEECH-     INDICATION: DYSPHAGIA, OROPHARYNGEAL, HAS ATTRIBUTABLE CAUSE     TECHNIQUE: 42 seconds of fluoroscopic time was  used for this exam. 1  associated image was saved. The patient was evaluated in the seated  lateral position while taking a variety of consistencies of barium by  mouth under the direction of speech pathology.     COMPARISON: NONE     FINDINGS: There was penetration that cleared without aspiration with  sips of thin barium. There was no penetration and no aspiration with  pudding, or solid consistency barium.          Impression:       Fluoroscopy provided for a modified barium swallow. Please  see speech therapy report for full details and recommendations.         This report was finalized on 1/23/2019 4:36 PM by Dr. Jeff Garcia MD.                       Results for orders placed during the hospital encounter of 10/22/17   Adult Transthoracic Echo Complete W/ Cont if Necessary Per Protocol    Narrative · Left ventricular systolic function is normal. Estimated EF = 60%.  · The cardiac valves are anatomically and functionally normal.           Order Current Status    Anaerobic Culture - Body Fluid, Peritoneum Preliminary result    Fungus Culture - Body Fluid, Peritoneum Preliminary result        Discharge Details        Discharge Medications      New Medications      Instructions Start Date   apixaban 5 MG tablet tablet  Commonly known as:  ELIQUIS   5 mg, Oral, Every 12 Hours Scheduled      DAPTOmycin 550 mg in sodium chloride 0.9 % 50 mL   6 mg/kg, Intravenous, Every 24 Hours      ertapenem 1 gm/100ml solution IV  Commonly known as:  INVanz   1 g, Intravenous, Every 24 Hours      lactobacillus acidophilus capsule capsule   1 capsule, Oral, Daily      micafungin  Commonly known as:  MYCAMINE   100 mg, Intravenous, Every 24 Hours      terazosin 2 MG capsule  Commonly known as:  HYTRIN   2 mg, Oral, Nightly         Changes to Medications      Instructions Start Date   colchicine 0.6 MG tablet  What changed:    · when to take this  · reasons to take this   0.6 mg, Oral, Daily         Continue These Medications       Instructions Start Date   albuterol (2.5 MG/3ML) 0.083% nebulizer solution  Commonly known as:  PROVENTIL   2.5 mg, Nebulization, 2 Times Daily PRN      albuterol sulfate  (90 Base) MCG/ACT inhaler  Commonly known as:  PROVENTIL HFA;VENTOLIN HFA;PROAIR HFA   2 puffs, Inhalation, Every 4 Hours PRN      allopurinol 100 MG tablet  Commonly known as:  ZYLOPRIM   100 mg, Oral, Daily      cholecalciferol 1000 units tablet  Commonly known as:  VITAMIN D3   1,000 Units, Oral, Daily      dronabinol 5 MG capsule  Commonly known as:  MARINOL   5 mg, Oral, 2 Times Daily Before Meals      flecainide 50 MG tablet  Commonly known as:  TAMBOCOR   50 mg, Oral, Every 12 Hours Scheduled      FLUoxetine 20 MG capsule  Commonly known as:  PROzac   20 mg, Oral, 2 Times Daily      insulin lispro 100 UNIT/ML injection  Commonly known as:  humaLOG   Subcutaneous, 3 Times Daily Before Meals      lansoprazole 30 MG capsule  Commonly known as:  PREVACID   30 mg, Oral, Daily      linaclotide 145 MCG capsule capsule  Commonly known as:  LINZESS   145 mcg, Oral, Every Morning Before Breakfast      metoprolol tartrate 25 MG tablet  Commonly known as:  LOPRESSOR   12.5 mg, Oral, Every 12 Hours      mometasone 220 MCG/INH inhaler  Commonly known as:  ASMANEX   2 puffs, Inhalation, Nightly      montelukast 10 MG tablet  Commonly known as:  SINGULAIR   10 mg, Oral, Nightly      nitroglycerin 0.4 MG SL tablet  Commonly known as:  NITROSTAT   0.4 mg, Sublingual, As Needed      ondansetron 8 MG tablet  Commonly known as:  ZOFRAN   8 mg, Oral, Every 8 Hours PRN      oxyCODONE 5 MG immediate release tablet  Commonly known as:  ROXICODONE   5 mg, Oral, Every 4 Hours PRN      polyethylene glycol packet  Commonly known as:  MIRALAX   17 g, Oral, Daily PRN      promethazine 12.5 MG tablet  Commonly known as:  PHENERGAN   12.5 mg, Oral, Every 6 Hours PRN      tiotropium bromide-olodaterol 2.5-2.5 MCG/ACT aerosol solution inhaler  Commonly known as:   STIOLTO RESPIMAT   2 puffs, Inhalation, Daily         Stop These Medications    enoxaparin 100 MG/ML solution syringe  Commonly known as:  LOVENOX     Linezolid 600 MG/300ML IVPB  Commonly known as:  ZYVOX     LORazepam 0.5 MG tablet  Commonly known as:  ATIVAN     meropenem  Commonly known as:  MERREM     Morphine 30 MG 12 hr tablet  Commonly known as:  MS CONTIN            Allergies   Allergen Reactions   • Ativan [Lorazepam] Mental Status Change   • Chlorhexidine Hives and Rash   • Contrast Dye Other (See Comments)     Can't have due to kidney failure per family   • Fentanyl Confusion and Unknown (See Comments)     Patient family reports extreme symptoms with fentanyl. Including confusion, restlessness, irritability an heavy sedation.         Discharge Disposition:  Home-Health Care c    Discharge Diet:  Diet Order   Procedures   • Diet Soft Texture; Chopped; Thin; Cardiac, Consistent Carbohydrate         Discharge Activity:   Activity Instructions     Activity as Tolerated            CODE STATUS:    Code Status and Medical Interventions:   Ordered at: 01/21/19 9356     Level Of Support Discussed With:    Health Care Surrogate     Code Status:    CPR     Medical Interventions (Level of Support Prior to Arrest):    Full       Future Appointments   Date Time Provider Department Center   2/5/2019  1:00 PM Laura Jackman APRN [facility]   2/5/2019  2:30 PM Jefry Luna MD [facility] None   3/26/2019 10:40 AM Adiel Denney MD [facility] None   6/21/2019 10:15 AM Rj Perez MD [facility] None       Additional Instructions for the Follow-ups that You Need to Schedule     Ambulatory Referral to Home Health   As directed      Face to Face Visit Date:  1/30/2019    Follow-up Provider for Plan of Care?:  I treated the patient in an acute care facility and will not continue treatment after discharge.    Follow-up Provider:  PATRICIA BURRIS [[id number]]    Reason/Clinical  Findings:  pancreatic cancer    Describe mobility limitations that make leaving home difficult:  weakness; deconditioning    Nursing/Therapeutic Services Requested:  Skilled Nursing Physical Therapy Occupational Therapy    Skilled nursing orders:  PICC line care/instruction Wound care dressing/changes Infusion therapy    Instructions:  Monitor RUQ abdomen bulb suction placed 1/25/19; Drain will remain in until it is putting out 30 mL or less a day per Dr. Rivas    PT orders:  Other (eval & treat) Home safety assessment    Occupational orders:  Other (eval & treat) Home safety assessment         Discharge Follow-up with PCP   As directed       Currently Documented PCP:    Adiel Denney MD    PCP Phone Number:    [phone]     Follow Up Details:  in 1 week         Discharge Follow-up with Specialty: Dr. Higgins, on Wednesday 2/6   As directed      Specialty:  Dr. Higgins, on Wednesday 2/6         Discharge Follow-up with Specified Provider: Dr. Mcdonald, Oncologist, as able to reschedule   As directed      To:  Dr. Mcdonald, Oncologist, as able to reschedule         Discharge Follow-up with Specified Provider: Dr. Rivas, in 10 days, Wednesday 2/13   As directed      To:  Dr. Rivas, in 10 days, Wednesday 2/13               Time Spent on Discharge:  40 minutes    Electronically signed by Bhargav Avila MD, 02/01/19, 11:13 AM.

## 2019-02-01 NOTE — DISCHARGE INSTRUCTIONS
Pt should measure his drain output daily and keep a diary. The drain needs to be stripped 2-3 times daily and as needed.

## 2019-02-01 NOTE — PROGRESS NOTES
Continued Stay Note   [place]     Patient Name: Todd Phillips  MRN: [id number]  Today's Date: 2/1/2019    Admit Date: 1/21/2019    Discharge Plan     Row Name 02/01/19 1117       Plan    Plan Comments  Home Health has been arranged with [facility], [phone], home infusion has been arranged with [facility], [phone], and DME will be provided by [facility] as well, [phone].    Final Discharge Disposition Code  06 - home with home health care    Final Note  Home with [facility] for home infusion and DME        Discharge Codes    No documentation.       Expected Discharge Date and Time     Expected Discharge Date Expected Discharge Time    Feb 1, 2019             Marycruz Oquendo RN

## 2019-02-01 NOTE — PLAN OF CARE
Problem: Patient Care Overview  Goal: Plan of Care Review  Outcome: Ongoing (interventions implemented as appropriate)   02/01/19 1153   Coping/Psychosocial   Plan of Care Reviewed With patient;family   SLP treatment completed. Will continue to address dysphagia and communication deficits through treatment. Please see note for further details and recommendations.

## 2019-02-02 ENCOUNTER — READMISSION MANAGEMENT (OUTPATIENT)
Dept: CALL CENTER | Facility: HOSPITAL | Age: 69
End: 2019-02-02

## 2019-02-02 NOTE — OUTREACH NOTE
Prep Survey      Responses   Facility patient discharged from?  [facility]   Is patient eligible?  Yes   Discharge diagnosis  Altered mental status, unspecified, Metabolic encephalopathy,  Intra-abdominal abscess   pancreatic cancer   Does the patient have one of the following disease processes/diagnoses(primary or secondary)?  Other   Does the patient have Home health ordered?  Yes   What is the Home health agency?   [facility]   Is there a DME ordered?  No   Prep survey completed?  Yes          Daisy Pena RN

## 2019-02-04 ENCOUNTER — READMISSION MANAGEMENT (OUTPATIENT)
Dept: CALL CENTER | Facility: HOSPITAL | Age: 69
End: 2019-02-04

## 2019-02-04 NOTE — OUTREACH NOTE
Medical Week 1 Survey      Responses   Facility patient discharged from?  [facility]   Does the patient have one of the following disease processes/diagnoses(primary or secondary)?  Other   Is there a successful TCM telephone encounter documented?  No   Week 1 attempt successful?  Yes   Call start time  1103   Call end time  1111   General alerts for this patient  Pancreatic CA   Discharge diagnosis  Altered mental status, unspecified, Metabolic encephalopathy,  Intra-abdominal abscess   pancreatic cancer   Is patient permission given to speak with other caregiver?  Yes   List who call center can speak with  Emmy   Person spoke with today (if not patient) and relationship  Wife   Medication alerts for this patient  IV antibx   Meds reviewed with patient/caregiver?  Yes   Is the patient having any side effects they believe may be caused by any medication additions or changes?  No   Does the patient have all medications ordered at discharge?  Yes   Is the patient taking all medications as directed (includes completed medication regime)?  Yes   Medication comments  probiotics with IV antibx   Does the patient have a primary care provider?   Yes   Does the patient have an appointment with their PCP within 7 days of discharge?  Yes   Comments regarding PCP  Wife will change Hem/Oc appt as pt not feeling like going but will keep ID appt for Wednesday   Has the patient kept scheduled appointments due by today?  N/A   What is the Home health agency?   [facility]   Has home health visited the patient within 72 hours of discharge?  Yes   Home health comments   nurse visiting during call this am   What DME was ordered?  bed/ commade chair   Has all DME been delivered?  Yes   Psychosocial issues?  No   Did the patient receive a copy of their discharge instructions?  Yes   Nursing interventions  Reviewed instructions with patient   What is the patient's perception of their health status since discharge?   Improving   Is the patient/caregiver able to teach back signs and symptoms related to disease process for when to call PCP?  Yes   Is the patient/caregiver able to teach back signs and symptoms related to disease process for when to call 911?  Yes   Is the patient/caregiver able to teach back the hierarchy of who to call/visit for symptoms/problems? PCP, Specialist, Home health nurse, Urgent Care, ED, 911  Yes   Additional teach back comments  Wife states pt doing bewtter. Enc probiotics and f/u MD visits.   Week 1 call completed?  Yes          Varsha Wolfe, RN

## 2019-02-05 ENCOUNTER — DOCUMENTATION (OUTPATIENT)
Dept: ONCOLOGY | Facility: CLINIC | Age: 69
End: 2019-02-05

## 2019-02-05 ENCOUNTER — DOCUMENTATION (OUTPATIENT)
Dept: ONCOLOGY | Facility: HOSPITAL | Age: 69
End: 2019-02-05

## 2019-02-05 NOTE — PROGRESS NOTES
Patient's spouse called today to ask when Dr. Brody's appointment was on the 2/13/2019. I called Jana at [facility] and she put patient down at 1:45pm on that day. I called spouse back and let her know appointment time/date/location and she verbalized understanding.     Spouse continued to tell me about a horrible experience that patient had at [facility] [place]. Spouse very please with [facility]. Spouse says that patient is doing so much better. AG

## 2019-02-05 NOTE — PROGRESS NOTES
SS received in basket per Nneka Givens stating:    Darby with [facility] has called again this morning asking if its okay if they go ahead with physical therapy??(upper body strengthing ,mobility, etc.) I'm not sure where these orders are coming from so i'm not sure what to tell them?? Her call back number again is [phone] and she said you can also speak to Sabrina. We asked if these were our orders and she said our name was on them??   SS awaiting reply from nurse.    SS received in basket this date per Nneka Givens stating:    Patients [facility] nurse called said patient was sent home from [facility]. She said his daughter is going to be doing his IV infusions and she would do his Picc dressing changes and his port flushes once monthly if that was okay?? Im not sure if she needs orders to do this if someone don't care to call and talk to her. She was asking for permission to do this? Her name was Kin phone number [phone]. Thanks     SS contacted [facility] [phone] per April to discuss above notes.  Home health nurse April states that HH received orders from [facility] and [facility] states they are going to go ahead with orders received.  SS spoke with Tamara Morejon to make aware.  SS will follow.

## 2019-02-06 ENCOUNTER — LAB (OUTPATIENT)
Dept: LAB | Facility: HOSPITAL | Age: 69
End: 2019-02-06

## 2019-02-06 ENCOUNTER — TRANSCRIBE ORDERS (OUTPATIENT)
Dept: LAB | Facility: HOSPITAL | Age: 69
End: 2019-02-06

## 2019-02-06 DIAGNOSIS — K65.1 PERITONEAL ABSCESS (HCC): ICD-10-CM

## 2019-02-06 DIAGNOSIS — K65.1 PERITONEAL ABSCESS (HCC): Primary | ICD-10-CM

## 2019-02-06 DIAGNOSIS — C25.9 MALIGNANT NEOPLASM OF PANCREAS, UNSPECIFIED LOCATION OF MALIGNANCY (HCC): ICD-10-CM

## 2019-02-06 LAB
ALBUMIN SERPL-MCNC: 3.31 G/DL (ref 3.2–4.8)
ALBUMIN/GLOB SERPL: 1 G/DL (ref 1.5–2.5)
ALP SERPL-CCNC: 170 U/L (ref 25–100)
ALT SERPL W P-5'-P-CCNC: 29 U/L (ref 7–40)
ANION GAP SERPL CALCULATED.3IONS-SCNC: 8 MMOL/L (ref 3–11)
AST SERPL-CCNC: 36 U/L (ref 0–33)
BASOPHILS # BLD AUTO: 0.03 10*3/MM3 (ref 0–0.2)
BASOPHILS NFR BLD AUTO: 0.4 % (ref 0–1)
BILIRUB SERPL-MCNC: 0.3 MG/DL (ref 0.3–1.2)
BUN BLD-MCNC: 16 MG/DL (ref 9–23)
BUN/CREAT SERPL: 16.3 (ref 7–25)
CALCIUM SPEC-SCNC: 8.7 MG/DL (ref 8.7–10.4)
CHLORIDE SERPL-SCNC: 103 MMOL/L (ref 99–109)
CO2 SERPL-SCNC: 23 MMOL/L (ref 20–31)
CREAT BLD-MCNC: 0.98 MG/DL (ref 0.6–1.3)
DEPRECATED RDW RBC AUTO: 54.1 FL (ref 37–54)
EOSINOPHIL # BLD AUTO: 0.03 10*3/MM3 (ref 0–0.3)
EOSINOPHIL NFR BLD AUTO: 0.4 % (ref 0–3)
ERYTHROCYTE [DISTWIDTH] IN BLOOD BY AUTOMATED COUNT: 16.2 % (ref 11.3–14.5)
GFR SERPL CREATININE-BSD FRML MDRD: 76 ML/MIN/1.73
GLOBULIN UR ELPH-MCNC: 3.2 GM/DL
GLUCOSE BLD-MCNC: 131 MG/DL (ref 70–100)
HCT VFR BLD AUTO: 31.8 % (ref 38.9–50.9)
HGB BLD-MCNC: 10 G/DL (ref 13.1–17.5)
IMM GRANULOCYTES # BLD AUTO: 0.05 10*3/MM3 (ref 0–0.03)
IMM GRANULOCYTES NFR BLD AUTO: 0.6 % (ref 0–0.6)
LYMPHOCYTES # BLD AUTO: 2.34 10*3/MM3 (ref 0.6–4.8)
LYMPHOCYTES NFR BLD AUTO: 27.4 % (ref 24–44)
MCH RBC QN AUTO: 28.7 PG (ref 27–31)
MCHC RBC AUTO-ENTMCNC: 31.4 G/DL (ref 32–36)
MCV RBC AUTO: 91.4 FL (ref 80–99)
MONOCYTES # BLD AUTO: 0.74 10*3/MM3 (ref 0–1)
MONOCYTES NFR BLD AUTO: 8.7 % (ref 0–12)
NEUTROPHILS # BLD AUTO: 5.41 10*3/MM3 (ref 1.5–8.3)
NEUTROPHILS NFR BLD AUTO: 63.1 % (ref 41–71)
PLATELET # BLD AUTO: 383 10*3/MM3 (ref 150–450)
PMV BLD AUTO: 8.7 FL (ref 6–12)
POTASSIUM BLD-SCNC: 4.7 MMOL/L (ref 3.5–5.5)
PROT SERPL-MCNC: 6.5 G/DL (ref 5.7–8.2)
RBC # BLD AUTO: 3.48 10*6/MM3 (ref 4.2–5.76)
SODIUM BLD-SCNC: 134 MMOL/L (ref 132–146)
WBC NRBC COR # BLD: 8.55 10*3/MM3 (ref 3.5–10.8)

## 2019-02-06 PROCEDURE — 36415 COLL VENOUS BLD VENIPUNCTURE: CPT

## 2019-02-06 PROCEDURE — 80053 COMPREHEN METABOLIC PANEL: CPT

## 2019-02-06 PROCEDURE — 85025 COMPLETE CBC W/AUTO DIFF WBC: CPT

## 2019-02-08 ENCOUNTER — TELEPHONE (OUTPATIENT)
Dept: ONCOLOGY | Facility: HOSPITAL | Age: 69
End: 2019-02-08

## 2019-02-12 ENCOUNTER — READMISSION MANAGEMENT (OUTPATIENT)
Dept: CALL CENTER | Facility: HOSPITAL | Age: 69
End: 2019-02-12

## 2019-02-12 NOTE — OUTREACH NOTE
Medical Week 2 Survey      Responses   Facility patient discharged from?  [facility]   Does the patient have one of the following disease processes/diagnoses(primary or secondary)?  Other   Week 2 attempt successful?  Yes   Call start time  0848   General alerts for this patient  Pancreatic CA   Discharge diagnosis  Altered mental status, unspecified, Metabolic encephalopathy,  Intra-abdominal abscess   pancreatic cancer   Call end time  0903   Person spoke with today (if not patient) and relationship  Emmy, wife   Has the patient kept scheduled appointments due by today?  Yes   Comments  Sees MD tomorrow   What is the Home health agency?   [facility]   Comments  No TPN   What is the patient's perception of their health status since discharge?  Improving   Additional teach back comments  Wife says he is doing better. Eating a little better, taking nutritional supplements.   Week 2 Call Completed?  Yes          Kasie Steward RN

## 2019-02-13 ENCOUNTER — DOCUMENTATION (OUTPATIENT)
Dept: ONCOLOGY | Facility: CLINIC | Age: 69
End: 2019-02-13

## 2019-02-13 ENCOUNTER — HOSPITAL ENCOUNTER (OUTPATIENT)
Dept: ONCOLOGY | Facility: HOSPITAL | Age: 69
Setting detail: INFUSION SERIES
Discharge: HOME | End: 2019-02-13

## 2019-02-13 VITALS
SYSTOLIC BLOOD PRESSURE: 86 MMHG | HEIGHT: 72 IN | TEMPERATURE: 98 F | WEIGHT: 184 LBS | BODY MASS INDEX: 24.92 KG/M2 | RESPIRATION RATE: 16 BRPM | DIASTOLIC BLOOD PRESSURE: 54 MMHG | HEART RATE: 87 BPM

## 2019-02-13 DIAGNOSIS — C25.0 MALIGNANT NEOPLASM OF HEAD OF PANCREAS (HCC): Primary | ICD-10-CM

## 2019-02-13 PROCEDURE — 96360 HYDRATION IV INFUSION INIT: CPT

## 2019-02-13 RX ORDER — SODIUM CHLORIDE 9 MG/ML
1000 INJECTION, SOLUTION INTRAVENOUS ONCE
Status: COMPLETED | OUTPATIENT
Start: 2019-02-13 | End: 2019-02-13

## 2019-02-13 RX ORDER — SODIUM CHLORIDE 0.9 % (FLUSH) 0.9 %
10 SYRINGE (ML) INJECTION AS NEEDED
Status: CANCELLED | OUTPATIENT
Start: 2019-02-13

## 2019-02-13 RX ORDER — SODIUM CHLORIDE 0.9 % (FLUSH) 0.9 %
10 SYRINGE (ML) INJECTION AS NEEDED
Status: DISCONTINUED | OUTPATIENT
Start: 2019-02-13 | End: 2019-02-14 | Stop reason: HOSPADM

## 2019-02-13 RX ADMIN — SODIUM CHLORIDE 1000 ML/HR: 9 INJECTION, SOLUTION INTRAVENOUS at 14:55

## 2019-02-13 RX ADMIN — HEPARIN 500 UNITS: 100 SYRINGE at 16:13

## 2019-02-13 RX ADMIN — SODIUM CHLORIDE, PRESERVATIVE FREE 10 ML: 5 INJECTION INTRAVENOUS at 16:13

## 2019-02-14 ENCOUNTER — DOCUMENTATION (OUTPATIENT)
Dept: ONCOLOGY | Facility: CLINIC | Age: 69
End: 2019-02-14

## 2019-02-14 ENCOUNTER — TELEPHONE (OUTPATIENT)
Dept: ONCOLOGY | Facility: HOSPITAL | Age: 69
End: 2019-02-14

## 2019-02-14 NOTE — TELEPHONE ENCOUNTER
----- Message from Selin Joe sent at 2/14/2019  3:27 PM EST -----  Regarding: PATIENT CALL  Contact: [phone]  The patient's wife called stating that Todd was very weak and sick to his stomach.  She said [facility] suggested that his Potassium and/or Magnesium might be low and told Mrs. Phillips that if we called them with a verbal order before 4:30 today they would go to the house and draw the labs today.

## 2019-02-14 NOTE — PROGRESS NOTES
Patient followed up with Dr. Brody on this day. Patient's BP was a low, so Dr. Brody ordered patient to have a liter of NS over 1 hour in Outpatient Infusion. I called Yakelin, Charge Nurse, and she approved the order. Patient was taken down to [facility] Le[facility] RN. Order for IVF's given to spouse. AG

## 2019-02-14 NOTE — PROGRESS NOTES
Patient and family escorted to [facility] to get a shuttle to cancer center. Patient will get fluids. SC

## 2019-02-18 LAB — FUNGUS WND CULT: ABNORMAL

## 2019-02-20 ENCOUNTER — INFUSION (OUTPATIENT)
Dept: ONCOLOGY | Facility: HOSPITAL | Age: 69
End: 2019-02-20
Attending: INTERNAL MEDICINE

## 2019-02-20 ENCOUNTER — DOCUMENTATION (OUTPATIENT)
Dept: ONCOLOGY | Facility: HOSPITAL | Age: 69
End: 2019-02-20

## 2019-02-20 ENCOUNTER — LAB (OUTPATIENT)
Dept: ONCOLOGY | Facility: CLINIC | Age: 69
End: 2019-02-20

## 2019-02-20 ENCOUNTER — OFFICE VISIT (OUTPATIENT)
Dept: ONCOLOGY | Facility: CLINIC | Age: 69
End: 2019-02-20

## 2019-02-20 VITALS
TEMPERATURE: 98 F | BODY MASS INDEX: 24.43 KG/M2 | HEART RATE: 113 BPM | OXYGEN SATURATION: 99 % | RESPIRATION RATE: 18 BRPM | WEIGHT: 180.2 LBS | DIASTOLIC BLOOD PRESSURE: 64 MMHG | SYSTOLIC BLOOD PRESSURE: 109 MMHG

## 2019-02-20 VITALS
WEIGHT: 180.2 LBS | SYSTOLIC BLOOD PRESSURE: 109 MMHG | TEMPERATURE: 98 F | HEART RATE: 113 BPM | OXYGEN SATURATION: 99 % | RESPIRATION RATE: 18 BRPM | BODY MASS INDEX: 24.43 KG/M2 | DIASTOLIC BLOOD PRESSURE: 64 MMHG

## 2019-02-20 DIAGNOSIS — C25.0 MALIGNANT NEOPLASM OF HEAD OF PANCREAS (HCC): Primary | ICD-10-CM

## 2019-02-20 DIAGNOSIS — T81.43XA INTRA-ABDOMINAL ABSCESS POST-PROCEDURE: ICD-10-CM

## 2019-02-20 DIAGNOSIS — G89.3 NEOPLASM RELATED PAIN: ICD-10-CM

## 2019-02-20 DIAGNOSIS — J90 PLEURAL EFFUSION: ICD-10-CM

## 2019-02-20 DIAGNOSIS — E86.0 DEHYDRATION: ICD-10-CM

## 2019-02-20 DIAGNOSIS — R11.0 NAUSEA: ICD-10-CM

## 2019-02-20 DIAGNOSIS — C25.0 MALIGNANT NEOPLASM OF HEAD OF PANCREAS (HCC): ICD-10-CM

## 2019-02-20 LAB
ALBUMIN SERPL-MCNC: 3.4 G/DL (ref 3.4–4.8)
ALBUMIN/GLOB SERPL: 0.9 G/DL (ref 1.5–2.5)
ALP SERPL-CCNC: 143 U/L (ref 40–129)
ALT SERPL W P-5'-P-CCNC: 26 U/L (ref 10–44)
ANION GAP SERPL CALCULATED.3IONS-SCNC: 9.6 MMOL/L (ref 3.6–11.2)
AST SERPL-CCNC: 28 U/L (ref 10–34)
BASOPHILS # BLD AUTO: 0.02 10*3/MM3 (ref 0–0.3)
BASOPHILS NFR BLD AUTO: 0.2 % (ref 0–2)
BILIRUB SERPL-MCNC: 0.3 MG/DL (ref 0.2–1.8)
BUN BLD-MCNC: 17 MG/DL (ref 7–21)
BUN/CREAT SERPL: 15.7 (ref 7–25)
CALCIUM SPEC-SCNC: 9.2 MG/DL (ref 7.7–10)
CHLORIDE SERPL-SCNC: 104 MMOL/L (ref 99–112)
CO2 SERPL-SCNC: 24.4 MMOL/L (ref 24.3–31.9)
CREAT BLD-MCNC: 1.08 MG/DL (ref 0.43–1.29)
CRP SERPL-MCNC: 4.88 MG/DL (ref 0–0.99)
DEPRECATED RDW RBC AUTO: 48.5 FL (ref 37–54)
EOSINOPHIL # BLD AUTO: 0.02 10*3/MM3 (ref 0–0.7)
EOSINOPHIL NFR BLD AUTO: 0.2 % (ref 0–7)
ERYTHROCYTE [DISTWIDTH] IN BLOOD BY AUTOMATED COUNT: 15.7 % (ref 11.5–14.5)
FERRITIN SERPL-MCNC: 435 NG/ML (ref 21.9–321.7)
FOLATE SERPL-MCNC: 19.7 NG/ML (ref 5.4–20)
GFR SERPL CREATININE-BSD FRML MDRD: 68 ML/MIN/1.73
GLOBULIN UR ELPH-MCNC: 3.9 GM/DL
GLUCOSE BLD-MCNC: 185 MG/DL (ref 70–110)
HCT VFR BLD AUTO: 30.3 % (ref 42–52)
HGB BLD-MCNC: 9.5 G/DL (ref 14–18)
IMM GRANULOCYTES # BLD AUTO: 0.08 10*3/MM3 (ref 0–0.03)
IMM GRANULOCYTES NFR BLD AUTO: 0.8 % (ref 0–0.5)
IRON 24H UR-MRATE: 11 MCG/DL (ref 53–167)
IRON SATN MFR SERPL: 6 % (ref 20–50)
LYMPHOCYTES # BLD AUTO: 2.09 10*3/MM3 (ref 1–3)
LYMPHOCYTES NFR BLD AUTO: 20.4 % (ref 16–46)
MCH RBC QN AUTO: 27.1 PG (ref 27–33)
MCHC RBC AUTO-ENTMCNC: 31.4 G/DL (ref 33–37)
MCV RBC AUTO: 86.3 FL (ref 80–94)
MONOCYTES # BLD AUTO: 0.77 10*3/MM3 (ref 0.1–0.9)
MONOCYTES NFR BLD AUTO: 7.5 % (ref 0–12)
NEUTROPHILS # BLD AUTO: 7.26 10*3/MM3 (ref 1.4–6.5)
NEUTROPHILS NFR BLD AUTO: 70.9 % (ref 40–75)
OSMOLALITY SERPL CALC.SUM OF ELEC: 282 MOSM/KG (ref 273–305)
PLATELET # BLD AUTO: 413 10*3/MM3 (ref 130–400)
PMV BLD AUTO: 9.4 FL (ref 6–10)
POTASSIUM BLD-SCNC: 4.1 MMOL/L (ref 3.5–5.3)
PROT SERPL-MCNC: 7.3 G/DL (ref 6–8)
RBC # BLD AUTO: 3.51 10*6/MM3 (ref 4.7–6.1)
RETICS # AUTO: 0.03 10*6/MM3 (ref 0.02–0.13)
RETICS/RBC NFR AUTO: 0.98 % (ref 0.5–2)
SODIUM BLD-SCNC: 138 MMOL/L (ref 135–153)
TIBC SERPL-MCNC: 196 MCG/DL (ref 241–421)
VIT B12 BLD-MCNC: 1361 PG/ML (ref 211–911)
WBC NRBC COR # BLD: 10.24 10*3/MM3 (ref 4.5–12.5)

## 2019-02-20 PROCEDURE — 85045 AUTOMATED RETICULOCYTE COUNT: CPT | Performed by: INTERNAL MEDICINE

## 2019-02-20 PROCEDURE — 83540 ASSAY OF IRON: CPT | Performed by: INTERNAL MEDICINE

## 2019-02-20 PROCEDURE — 82746 ASSAY OF FOLIC ACID SERUM: CPT | Performed by: INTERNAL MEDICINE

## 2019-02-20 PROCEDURE — 96361 HYDRATE IV INFUSION ADD-ON: CPT

## 2019-02-20 PROCEDURE — 25010000002 ONDANSETRON PER 1 MG: Performed by: INTERNAL MEDICINE

## 2019-02-20 PROCEDURE — 86301 IMMUNOASSAY TUMOR CA 19-9: CPT | Performed by: INTERNAL MEDICINE

## 2019-02-20 PROCEDURE — 82728 ASSAY OF FERRITIN: CPT | Performed by: INTERNAL MEDICINE

## 2019-02-20 PROCEDURE — 96365 THER/PROPH/DIAG IV INF INIT: CPT

## 2019-02-20 PROCEDURE — 80053 COMPREHEN METABOLIC PANEL: CPT | Performed by: INTERNAL MEDICINE

## 2019-02-20 PROCEDURE — 96374 THER/PROPH/DIAG INJ IV PUSH: CPT

## 2019-02-20 PROCEDURE — 85025 COMPLETE CBC W/AUTO DIFF WBC: CPT | Performed by: INTERNAL MEDICINE

## 2019-02-20 PROCEDURE — 99214 OFFICE O/P EST MOD 30 MIN: CPT | Performed by: INTERNAL MEDICINE

## 2019-02-20 PROCEDURE — 86140 C-REACTIVE PROTEIN: CPT | Performed by: INTERNAL MEDICINE

## 2019-02-20 PROCEDURE — 82607 VITAMIN B-12: CPT | Performed by: INTERNAL MEDICINE

## 2019-02-20 PROCEDURE — 83550 IRON BINDING TEST: CPT | Performed by: INTERNAL MEDICINE

## 2019-02-20 RX ORDER — SODIUM CHLORIDE 0.9 % (FLUSH) 0.9 %
10 SYRINGE (ML) INJECTION AS NEEDED
Status: DISCONTINUED | OUTPATIENT
Start: 2019-02-20 | End: 2019-02-20 | Stop reason: HOSPADM

## 2019-02-20 RX ORDER — SODIUM CHLORIDE 0.9 % (FLUSH) 0.9 %
10 SYRINGE (ML) INJECTION AS NEEDED
Status: CANCELLED | OUTPATIENT
Start: 2019-02-20

## 2019-02-20 RX ORDER — SODIUM CHLORIDE 9 MG/ML
INJECTION, SOLUTION INTRAVENOUS
Status: COMPLETED
Start: 2019-02-20 | End: 2019-02-20

## 2019-02-20 RX ADMIN — SODIUM CHLORIDE 1000 ML: 9 INJECTION, SOLUTION INTRAVENOUS at 13:21

## 2019-02-20 RX ADMIN — SODIUM CHLORIDE 16 MG: 9 INJECTION, SOLUTION INTRAVENOUS at 13:27

## 2019-02-20 RX ADMIN — SODIUM CHLORIDE 1000 ML: 9 INJECTION, SOLUTION INTRAVENOUS at 14:23

## 2019-02-20 RX ADMIN — SODIUM CHLORIDE, PRESERVATIVE FREE 10 ML: 5 INJECTION INTRAVENOUS at 15:36

## 2019-02-20 RX ADMIN — HEPARIN SODIUM (PORCINE) LOCK FLUSH IV SOLN 100 UNIT/ML 500 UNITS: 100 SOLUTION at 15:36

## 2019-02-20 NOTE — PROGRESS NOTES
SS spoke with Dr. Alves this date.  MD request for SS to arrange IV fluids twice a week for patient until further notice.  SS contacted [facility] [phone] per Matilde to arrange IV Normal Saline fluids.  Fluids to be delivered to pt's home for start of service tomorrow and Monday.  SS contacted [facility] [phone] per Lorie/April to arrange for pt to receive IV fluids. SS will follow as needed.

## 2019-02-21 ENCOUNTER — READMISSION MANAGEMENT (OUTPATIENT)
Dept: CALL CENTER | Facility: HOSPITAL | Age: 69
End: 2019-02-21

## 2019-02-21 LAB — CANCER AG19-9 SERPL-ACNC: 85 U/ML (ref 0–35)

## 2019-02-21 NOTE — OUTREACH NOTE
Medical Week 4 Survey      Responses   Facility patient discharged from?  [facility]   Does the patient have one of the following disease processes/diagnoses(primary or secondary)?  Other   Week 4 attempt successful?  No          Varsha Wolfe RN

## 2019-02-22 ENCOUNTER — TELEPHONE (OUTPATIENT)
Dept: ONCOLOGY | Facility: HOSPITAL | Age: 69
End: 2019-02-22

## 2019-02-22 NOTE — TELEPHONE ENCOUNTER
----- Message from Gwen Alves MD sent at 2/21/2019  6:39 PM EST -----  Yes please  ----- Message -----  From: Henri Zepeda, RN  Sent: 2/21/2019   9:26 AM  To: Gwen Alves MD    Alright, so would you just want me to let her know that for the time being we would like to strengthen him and see where to go from there?  I guess that would be the best way to put it?     ----- Message -----  From: Gwen Alves MD  Sent: 2/20/2019   7:41 PM  To: Henri Zepeda RN    Right now, I dont' think he could really tolerate either.  Let's work to get him strengthened and then we can consider this further.  ----- Message -----  From: Henri Zepeda RN  Sent: 2/20/2019   3:23 PM  To: Gwen Alves MD    The patient's wife had a question about today's office visit with you. She wasn't sure if the patient would be getting just radiation or chemo or both, and if you thought he could tolerate chemo. She wanted to ask you personally but you were in an OV.

## 2019-02-22 NOTE — TELEPHONE ENCOUNTER
I have called and made the patient's wife aware that we are trying to get him strengthened up and then see where to go from there. She verbalized understanding and seemed pleased with this answer.

## 2019-02-26 ENCOUNTER — HOSPITAL ENCOUNTER (OUTPATIENT)
Dept: CT IMAGING | Facility: HOSPITAL | Age: 69
Discharge: HOME | End: 2019-02-26

## 2019-02-26 ENCOUNTER — HOSPITAL ENCOUNTER (OUTPATIENT)
Dept: CT IMAGING | Facility: HOSPITAL | Age: 69
Discharge: HOME | End: 2019-02-26
Admitting: INTERNAL MEDICINE

## 2019-02-26 DIAGNOSIS — C25.0 MALIGNANT NEOPLASM OF HEAD OF PANCREAS (HCC): ICD-10-CM

## 2019-02-26 DIAGNOSIS — T81.43XA INTRA-ABDOMINAL ABSCESS POST-PROCEDURE: ICD-10-CM

## 2019-02-26 DIAGNOSIS — J90 PLEURAL EFFUSION: ICD-10-CM

## 2019-02-26 PROCEDURE — 25010000002 IOPAMIDOL 61 % SOLUTION: Performed by: INTERNAL MEDICINE

## 2019-02-26 PROCEDURE — 74177 CT ABD & PELVIS W/CONTRAST: CPT

## 2019-02-26 PROCEDURE — 71260 CT THORAX DX C+: CPT

## 2019-02-26 PROCEDURE — 74177 CT ABD & PELVIS W/CONTRAST: CPT | Performed by: RADIOLOGY

## 2019-02-26 PROCEDURE — 71260 CT THORAX DX C+: CPT | Performed by: RADIOLOGY

## 2019-02-26 RX ADMIN — IOPAMIDOL 80 ML: 612 INJECTION, SOLUTION INTRAVENOUS at 10:39

## 2019-03-05 ENCOUNTER — OFFICE VISIT (OUTPATIENT)
Dept: ONCOLOGY | Facility: CLINIC | Age: 69
End: 2019-03-05

## 2019-03-05 ENCOUNTER — LAB (OUTPATIENT)
Dept: ONCOLOGY | Facility: CLINIC | Age: 69
End: 2019-03-05

## 2019-03-05 ENCOUNTER — INFUSION (OUTPATIENT)
Dept: ONCOLOGY | Facility: HOSPITAL | Age: 69
End: 2019-03-05
Attending: INTERNAL MEDICINE

## 2019-03-05 ENCOUNTER — DOCUMENTATION (OUTPATIENT)
Dept: ONCOLOGY | Facility: HOSPITAL | Age: 69
End: 2019-03-05

## 2019-03-05 VITALS
HEART RATE: 92 BPM | TEMPERATURE: 96.9 F | DIASTOLIC BLOOD PRESSURE: 64 MMHG | RESPIRATION RATE: 18 BRPM | SYSTOLIC BLOOD PRESSURE: 105 MMHG | OXYGEN SATURATION: 92 %

## 2019-03-05 VITALS
DIASTOLIC BLOOD PRESSURE: 64 MMHG | RESPIRATION RATE: 18 BRPM | TEMPERATURE: 96.9 F | OXYGEN SATURATION: 92 % | HEART RATE: 92 BPM | SYSTOLIC BLOOD PRESSURE: 105 MMHG

## 2019-03-05 DIAGNOSIS — D50.8 IRON DEFICIENCY ANEMIA SECONDARY TO INADEQUATE DIETARY IRON INTAKE: ICD-10-CM

## 2019-03-05 DIAGNOSIS — C25.0 MALIGNANT NEOPLASM OF HEAD OF PANCREAS (HCC): Primary | ICD-10-CM

## 2019-03-05 DIAGNOSIS — G89.3 NEOPLASM RELATED PAIN: ICD-10-CM

## 2019-03-05 DIAGNOSIS — R11.2 NAUSEA AND VOMITING, INTRACTABILITY OF VOMITING NOT SPECIFIED, UNSPECIFIED VOMITING TYPE: ICD-10-CM

## 2019-03-05 DIAGNOSIS — R97.8 ELEVATED CA 19-9 LEVEL: ICD-10-CM

## 2019-03-05 DIAGNOSIS — K90.9 MALABSORPTION OF IRON: ICD-10-CM

## 2019-03-05 LAB
ALBUMIN SERPL-MCNC: 3.7 G/DL (ref 3.4–4.8)
ALBUMIN/GLOB SERPL: 0.9 G/DL (ref 1.5–2.5)
ALP SERPL-CCNC: 128 U/L (ref 40–129)
ALT SERPL W P-5'-P-CCNC: 12 U/L (ref 10–44)
ANION GAP SERPL CALCULATED.3IONS-SCNC: 8.8 MMOL/L (ref 3.6–11.2)
AST SERPL-CCNC: 17 U/L (ref 10–34)
BASOPHILS # BLD AUTO: 0.02 10*3/MM3 (ref 0–0.3)
BASOPHILS NFR BLD AUTO: 0.2 % (ref 0–2)
BILIRUB SERPL-MCNC: 0.6 MG/DL (ref 0.2–1.8)
BUN BLD-MCNC: 20 MG/DL (ref 7–21)
BUN/CREAT SERPL: 17.1 (ref 7–25)
CALCIUM SPEC-SCNC: 9.4 MG/DL (ref 7.7–10)
CHLORIDE SERPL-SCNC: 104 MMOL/L (ref 99–112)
CO2 SERPL-SCNC: 22.2 MMOL/L (ref 24.3–31.9)
CREAT BLD-MCNC: 1.17 MG/DL (ref 0.43–1.29)
DEPRECATED RDW RBC AUTO: 48.7 FL (ref 37–54)
EOSINOPHIL # BLD AUTO: 0.04 10*3/MM3 (ref 0–0.7)
EOSINOPHIL NFR BLD AUTO: 0.4 % (ref 0–7)
ERYTHROCYTE [DISTWIDTH] IN BLOOD BY AUTOMATED COUNT: 16.2 % (ref 11.5–14.5)
FUNGUS WND CULT: ABNORMAL
GFR SERPL CREATININE-BSD FRML MDRD: 62 ML/MIN/1.73
GLOBULIN UR ELPH-MCNC: 4.3 GM/DL
GLUCOSE BLD-MCNC: 161 MG/DL (ref 70–110)
HCT VFR BLD AUTO: 31.3 % (ref 42–52)
HGB BLD-MCNC: 9.6 G/DL (ref 14–18)
IMM GRANULOCYTES # BLD AUTO: 0.04 10*3/MM3 (ref 0–0.03)
IMM GRANULOCYTES NFR BLD AUTO: 0.4 % (ref 0–0.5)
LYMPHOCYTES # BLD AUTO: 2 10*3/MM3 (ref 1–3)
LYMPHOCYTES NFR BLD AUTO: 20.7 % (ref 16–46)
MCH RBC QN AUTO: 25.9 PG (ref 27–33)
MCHC RBC AUTO-ENTMCNC: 30.7 G/DL (ref 33–37)
MCV RBC AUTO: 84.4 FL (ref 80–94)
MONOCYTES # BLD AUTO: 0.66 10*3/MM3 (ref 0.1–0.9)
MONOCYTES NFR BLD AUTO: 6.8 % (ref 0–12)
NEUTROPHILS # BLD AUTO: 6.88 10*3/MM3 (ref 1.4–6.5)
NEUTROPHILS NFR BLD AUTO: 71.5 % (ref 40–75)
OSMOLALITY SERPL CALC.SUM OF ELEC: 276.2 MOSM/KG (ref 273–305)
PLATELET # BLD AUTO: 476 10*3/MM3 (ref 130–400)
PMV BLD AUTO: 9.4 FL (ref 6–10)
POTASSIUM BLD-SCNC: 4.2 MMOL/L (ref 3.5–5.3)
PROT SERPL-MCNC: 8 G/DL (ref 6–8)
RBC # BLD AUTO: 3.71 10*6/MM3 (ref 4.7–6.1)
SODIUM BLD-SCNC: 135 MMOL/L (ref 135–153)
WBC NRBC COR # BLD: 9.64 10*3/MM3 (ref 4.5–12.5)

## 2019-03-05 PROCEDURE — 99214 OFFICE O/P EST MOD 30 MIN: CPT | Performed by: INTERNAL MEDICINE

## 2019-03-05 PROCEDURE — 85025 COMPLETE CBC W/AUTO DIFF WBC: CPT | Performed by: INTERNAL MEDICINE

## 2019-03-05 PROCEDURE — 36591 DRAW BLOOD OFF VENOUS DEVICE: CPT

## 2019-03-05 PROCEDURE — 80053 COMPREHEN METABOLIC PANEL: CPT | Performed by: INTERNAL MEDICINE

## 2019-03-05 RX ORDER — SODIUM CHLORIDE 0.9 % (FLUSH) 0.9 %
10 SYRINGE (ML) INJECTION AS NEEDED
Status: CANCELLED | OUTPATIENT
Start: 2019-03-05

## 2019-03-05 RX ORDER — SODIUM CHLORIDE 9 MG/ML
250 INJECTION, SOLUTION INTRAVENOUS ONCE
Status: CANCELLED | OUTPATIENT
Start: 2019-03-27

## 2019-03-05 RX ORDER — SODIUM CHLORIDE 9 MG/ML
250 INJECTION, SOLUTION INTRAVENOUS ONCE
Status: CANCELLED | OUTPATIENT
Start: 2019-04-03

## 2019-03-05 RX ORDER — SODIUM CHLORIDE 0.9 % (FLUSH) 0.9 %
10 SYRINGE (ML) INJECTION AS NEEDED
Status: DISCONTINUED | OUTPATIENT
Start: 2019-03-05 | End: 2019-03-05 | Stop reason: HOSPADM

## 2019-03-05 RX ADMIN — SODIUM CHLORIDE, PRESERVATIVE FREE 10 ML: 5 INJECTION INTRAVENOUS at 14:42

## 2019-03-05 RX ADMIN — HEPARIN SODIUM (PORCINE) LOCK FLUSH IV SOLN 100 UNIT/ML 500 UNITS: 100 SOLUTION at 14:42

## 2019-03-05 NOTE — PROGRESS NOTES
DATE : 03/05/19    DIAGNOSIS:   1.   Stage III moderately differentiated adenocarcinoma of the head of the pancreas with involvement of the duodenum ([id number]).    2.  Repeated episodes of bacteremia due to cholangitis -    3.  Refractory nausea/vomiting    CHIEF COMPLAINT:  Follow up of pancreatic cancer    TREATMENT:  1.       2.  On 12-31-18, Dr. Brody performed  Pancreaticoduodenectomy, wedge biopsy of the liver, portal vein resection and lateral repair and gastrojejunostomy tube placement    HISTORY OF PRESENT ILLNESS:   Todd Phillips is a very pleasant 68 y.o. male who is being seen today at the request of Dr. Miquel Brody for evaluation and treatment of pancreatic cancer.  Mr. Phillips initially developed symptoms in January of 2018. At that time he had pain in his upper abdomen which would radiate to his back.  In July, the pain intensified.  He was seen in the ER and also by Dr. Su.  He underwent RUQ U/S and then HIDA scan and it was determined he had a dysfunctional gallbladder.  He saw Dr. Downs and had cholecystectomy on 8-10-18.  Pathology showed chronic cholecystitis and an incidental benign lymph node.  He re-presented in the post-operative period with jaundice.  Dr. Su performed ERCP on8-14-18.  He was noted to have a 3 cm irregular tight stricture of the distal common bile duct with proximal biliary ductal dilatation.  Biliary papillotomy and stricture dilatation performed and brushings taken.  A 10 Fr x 5 cm biliary stent was placed.  Brushings were taken but were negative.  There was sl dilatation of the distal pancreatic duct without discrete stricture.   He was discharged with improving jaundice on 8-16.  On 8-21 he represented with sepsis due to Klebsiella pneumonia bacteremia and was admitted.  He then was referred to Dr. Brody for evaluation and treatment.  He has been set up for EUS with biopsy next Friday for what appears to be a primary pancreatic malignancy in the head of the  pancreas.  Dr. Brody has referred him for consideration of neoadjuvant therapy.    INTERVAL HISTORY:  Mr. Phillips is here today with his wife for follow up of pancreatic cancer. He is doing better since his last visit. He had feeding tube and drains removed over the last few weeks. He is still weak and fatigued, but feels he is improving. He is also still getting sick to his stomach and takes Zofran 2-3 times per day. He is eating well and drinking well - three meals/day as well as multiple snacks.  He also continues to take supplements 2x/day (down from 3x/day)l. He has been receiving IVF with home [facility]. Following meals, he experiences lower abdominal pain and nausea. His bowels are moving well.  He is overall well and without complaint.  He saw Dr. Brody who he says was pleased with his progress.      PAST MEDICAL HISTORY:  Past Medical History:   Diagnosis Date   • Antral gastritis    • Asthma    • Atrial fibrillation (CMS/HCC)     treated with oral blood thinner   • Chest pain    • COPD (chronic obstructive pulmonary disease) (CMS/HCC)    • Coronary artery disease     no stents   • Diabetes mellitus (CMS/HCC)     type 2 - checks sugar 4 times per day    • Duodenitis    • Elevated cholesterol    • Emphysema of lung (CMS/HCC)    • Gallbladder disease     removed    • GERD (gastroesophageal reflux disease)    • Hard to intubate     busted lip last time   • Hyperlipidemia    • Hypertension    • Jaundice    • Kidney stone    • Kidney stones     had lithotripsy and passed 36 kidney stones as well as had one surgically removed.   • Malignant neoplasm of head of pancreas (CMS/HCC) 9/5/2018   • Nausea    • Obstructive sleep apnea on CPAP     compliant with machine    • Palpitations    • Pneumonia 08/2018   • Reflux esophagitis    • Renal failure     stage 3 kidney disease   • Sepsis (CMS/HCC)        PAST SURGICAL HISTORY:  Past Surgical History:   Procedure Laterality Date   • BILE DUCT STENT PLACEMENT       [facility] 2 weeks ago    • CARDIAC CATHETERIZATION  11/01/1999   • CARDIOVASCULAR STRESS TEST  09/2012   • CHOLECYSTECTOMY N/A 8/10/2018    Procedure: CHOLECYSTECTOMY LAPAROSCOPIC;  Surgeon: Ronak Downs MD;  Location: [facility] OR;  Service: General   • COLONOSCOPY     • CYSTOSCOPY BLADDER STONE LITHOTRIPSY     • ECHO - CONVERTED  09/2012   • ERCP N/A 8/14/2018    Procedure: ENDOSCOPIC RETROGRADE CHOLANGIOPANCREATOGRAPHY WITH PAPILLOTOMY;  Surgeon: Scot Su MD;  Location:  [facility];  Service: Gastroenterology   • ERCP N/A 10/1/2018    Procedure: ENDOSCOPIC RETROGRADE CHOLANGIOPANCREATOGRAPHY;  Surgeon: Jefry Luna MD;  Location:  [facility];  Service: Gastroenterology   • KIDNEY STONE SURGERY     • KIDNEY STONE SURGERY      open abdominal surgery   • PORTACATH PLACEMENT Right 10/23/2018    Procedure: INSERTION OF PORTACATH;  Surgeon: Ronak Downs MD;  Location: [facility] OR;  Service: General   • TONSILLECTOMY     • UPPER GASTROINTESTINAL ENDOSCOPY  08/30/2012   • WHIPPLE PROCEDURE N/A 12/31/2018    Procedure: WHIPPLE PROCEDURE, BIOPSY OF LIVER, PORTAL VEIN RESECTION AND REPAIR, G/J TUBE PLACEMENT;  Surgeon: Miquel Brody MD;  Location:  [facility] OR;  Service: General       FAMILY HISTORY:  Family History   Problem Relation Age of Onset   • Heart attack Mother    • Heart disease Mother    • Stroke Mother    • Kidney disease Mother    • Heart failure Mother    • Lung disease Father    • Tuberculosis Father    • Diabetes Sister    • Heart attack Brother    • Heart failure Brother    • Heart disease Brother    • Diabetes Sister    • No Known Problems Brother    • No Known Problems Brother        SOCIAL HISTORY:  Social History     Socioeconomic History   • Marital status:      Spouse name: Not on file   • Number of children: Not on file   • Years of education: Not on file   • Highest education level: Not on file   Social Needs   • Financial resource  strain: Not on file   • Food insecurity - worry: Not on file   • Food insecurity - inability: Not on file   • Transportation needs - medical: Not on file   • Transportation needs - non-medical: Not on file   Occupational History   • Not on file   Tobacco Use   • Smoking status: Never Smoker   • Smokeless tobacco: Never Used   Substance and Sexual Activity   • Alcohol use: No   • Drug use: No   • Sexual activity: Defer     Partners: Female   Other Topics Concern   • Not on file   Social History Narrative    He is  and lives alone with his wife although they have 3 children together who all live close by. He is retired from the [employer] and was exposed to Agent Orange during [place]. He is a lifelong nonsmoker.         A DIL  of cancer.      REVIEW OF SYSTEMS:   A comprehensive 14 point review of systems was performed.  Significant findings as mentioned above.  All other systems reviewed and are negative.      MEDICATIONS:  The current medication list was reviewed in the EMR    Current Outpatient Medications:   •  albuterol (PROVENTIL HFA;VENTOLIN HFA) 108 (90 BASE) MCG/ACT inhaler, Inhale 2 puffs Every 4 (Four) Hours As Needed for Wheezing or Shortness of Air., Disp: , Rfl:   •  albuterol (PROVENTIL) (2.5 MG/3ML) 0.083% nebulizer solution, Take 2.5 mg by nebulization 2 (Two) Times a Day As Needed for Wheezing or Shortness of Air., Disp: , Rfl:   •  allopurinol (ZYLOPRIM) 100 MG tablet, Take 1 tablet by mouth Daily., Disp: 90 tablet, Rfl: 0  •  apixaban (ELIQUIS) 5 MG tablet tablet, Take 1 tablet by mouth Every 12 (Twelve) Hours., Disp: 60 tablet, Rfl:   •  cholecalciferol (VITAMIN D3) 1000 units tablet, Take 1,000 Units by mouth Daily., Disp: , Rfl:   •  colchicine 0.6 MG tablet, Take 1 tablet by mouth Daily. (Patient taking differently: Take 0.6 mg by mouth As Needed.), Disp: 90 tablet, Rfl: 2  •  flecainide (TAMBOCOR) 50 MG tablet, Take 1 tablet by mouth Every 12 (Twelve) Hours., Disp: 60 tablet, Rfl:  0  •  FLUoxetine (PROzac) 20 MG capsule, Take 20 mg by mouth 2 (Two) Times a Day., Disp: , Rfl:   •  insulin lispro (humaLOG) 100 UNIT/ML injection, Inject  under the skin into the appropriate area as directed 3 (Three) Times a Day Before Meals., Disp: , Rfl:   •  lactobacillus acidophilus (RISAQUAD) capsule capsule, Take 1 capsule by mouth Daily., Disp: 30 capsule, Rfl: 0  •  lansoprazole (PREVACID) 30 MG capsule, Take 30 mg by mouth Daily., Disp: , Rfl:   •  metoprolol tartrate (LOPRESSOR) 25 MG tablet, Take 12.5 mg by mouth Every 12 (Twelve) Hours., Disp: , Rfl:   •  mometasone (ASMANEX) 220 MCG/INH inhaler, Inhale 2 puffs Every Night., Disp: , Rfl:   •  montelukast (SINGULAIR) 10 MG tablet, Take 10 mg by mouth Every Night., Disp: , Rfl:   •  nitroglycerin (NITROSTAT) 0.4 MG SL tablet, Place 0.4 mg under the tongue as needed for chest pain., Disp: , Rfl:   •  ondansetron (ZOFRAN) 8 MG tablet, Take 1 tablet by mouth Every 8 (Eight) Hours As Needed for Nausea or Vomiting., Disp: 60 tablet, Rfl: 3  •  oxyCODONE (ROXICODONE) 5 MG immediate release tablet, Take 5 mg by mouth Every 4 (Four) Hours As Needed for Moderate Pain ., Disp: , Rfl:   •  polyethylene glycol (MIRALAX) packet, Take 17 g by mouth Daily As Needed., Disp: , Rfl:   •  promethazine (PHENERGAN) 12.5 MG tablet, Take 1 tablet by mouth Every 6 (Six) Hours As Needed for Nausea or Vomiting., Disp: 90 tablet, Rfl: 3  •  terazosin (HYTRIN) 2 MG capsule, Take 1 capsule by mouth Every Night., Disp: 30 capsule, Rfl: 0  •  Tiotropium Bromide-Olodaterol 2.5-2.5 MCG/ACT aerosol solution, Inhale 2 puffs Daily., Disp: , Rfl:   No current facility-administered medications for this visit.     Facility-Administered Medications Ordered in Other Visits:   •  heparin flush (porcine) 100 UNIT/ML injection 500 Units, 500 Units, Intravenous, PRN, Joselyn, Gwen B, MD, 500 Units at 03/05/19 1442  •  sodium chloride 0.9 % flush 10 mL, 10 mL, Intravenous, PRN, Gwen Avles,  MD, 10 mL at 03/05/19 1442  •  sodium chloride 0.9 % infusion  - ADS Override Pull, , , ,   •  sodium chloride 0.9 % infusion  - ADS Override Pull, , , ,     ALLERGIES:    Allergies   Allergen Reactions   • Ativan [Lorazepam] Mental Status Change   • Chlorhexidine Hives and Rash   • Contrast Dye Other (See Comments)     Can't have due to kidney failure per family   • Fentanyl Confusion and Unknown (See Comments)     Patient family reports extreme symptoms with fentanyl. Including confusion, restlessness, irritability an heavy sedation.       PHYSICAL EXAM:  Vitals:    03/05/19 1419   BP: 105/64   Pulse: 92   Resp: 18   Temp: 96.9 °F (36.1 °C)   SpO2: 92%   General:  Alert and oriented.  Appears well, more energetic.  HEENT:  Pupils are equal, round and reactive to light and accommodation, Extra-ocular movements full, Oropharyx clear, MMM  Neck:  No JVD, thyromegaly or lymphadenopathy  CV:  Regular rate/rhythm, no murmurs, rubs or gallops  Resp:  Lungs are clear to auscultation bilaterally  Abd:  Soft, somewhat tender to palpation bi over the epigastric and RUQ areas, non-distended, bowel sounds normal, no organomegaly or masses.  Surgical incisions well-healed.  Ext:  No clubbing, cyanosis or edema  Lymph:  No cervical, supraclavicular, axillary,adenopathy  Neuro: grossly non-focal exam    PATHOLOGY:  08-15-18         12-31-18 Whipple  Final Diagnosis   1. LIVER, WEDGE BIOPSY:  Small bland ductular proliferation compatible with bile duct adenoma.   Negative for metastatic carcinoma.   2. OMENTUM:  Mild chronic inflammation and surface adhesions; negative for neoplasm.   3. HEPATIC ARTERY LYMPH NODE:  Sinus histiocytosis; single lymph node negative for metastatic carcinoma. (0/1)  4. SUBMITTED BILE DUCT MARGIN:  Margin with chronic inflammation; negative for adenocarcinoma.   5. AORTA-CAVAL LYMPH NODES:  Two lymph nodes negative for metastatic carcinoma. (0/2)  6. PANCREATODUODENECTOMY (WHIPPLE PROCEDURE);  Invasive  moderately differentiated adenocarcinoma arising in head of pancreas and involving duodenum.  Gross tumor size 3.7 cm in greatest dimension.  Perineural and peripancreatic extension identified.   Soft tissue at superior mesenteric artery positive for neoplasm.  Posterior/uncinate margin involved by neoplasm.  Five of twelve peripancreatic lymph nodes positive for metastatic neoplasm. See Template.        PANCREAS EXOCRINE:  TYPE OF SPECIMEN/PROCEDURE: Whipple procedure  SPECIMEN INTEGRITY: Intact  TUMOR SITE (HEAD, BODY, TAIL): Head  TUMOR SIZE (GREATEST DIMENSION): 3.7 cm greatest dimension  HISTOLOGIC TYPE: Adenocarcinoma  HISTOLOGIC rdGrdRrdArdDrdErd:rd rd3rd TUMOR EXTENSION: Neoplasm involves duodenum and peripancreatic fat  SURGICAL MARGINS, IF INVOLVED, (SPECIFY WHERE): Involved  MARGINS EXAMINED: See below  PARENCHYMAL MARGIN: Not involved  UNCINATE: Involved  BILE DUCT: Not involved  DISTAL MARGIN: Not involved   PROXIMAL MARGIN: Not involved   OTHER MARGINS: SMA margin involved   CLOSEST MARGIN: Margins involved    SPECIFIC MARGIN: SMA and posterior/uncinate  INVADES CELIAC AXIS OR SMA: No  VASCULAR/LYMPHATIC INVASION: Present  PERINEURAL INVASION:  Present  REGIONAL LYMPH NODES: Submitted  NUMBER OF LYMPH NODES INVOLVED: 5  NUMBER OF LYMPH NODES EXAMINED: 15  EXTRACAPSULAR EXTENSION: Focally present  TREATMENT EFFECT: No known presurgical therapy  ADDITIONAL PATHOLOGIC FINDINGS: Chronic pancreatitis   OTHER STUDIES: Available upon request  AJCC PATHOLOGIC STAGE: (COMPLETED BY PATHOLOGIST BASED ONLY ON TISSUE FINDINGS, MORE EXTENSIVE DISEASE MAY NOT BE KNOW TO THE PATHOLOGIST)  pT=  2   pN= 2  AJCC PATHOLOGIC STAGE: III     1-11-19 Abdominal Fluid:  Final Diagnosis    ABDOMINAL FLUID:  Negative for malignancy.  Acute inflammation and necrotic debris.  No tumor seen.         ENDOSCOPY:  08-14-18 ERCP with papillotomy, balloon rotation of the biliary stricture, pathology brushings, ileum stent placement ([facility])  1.   Irregular 3 cm distal common bile duct stricture concerning for neoplastic disease. Biliary papillotomy, stricture dilatation by  through the scope balloon, cytology brushings performed and 10 [place]hi by 5 cm biliary stent placed.    2.  Dilated common hepatic duct to 15 mm when fully contrast filled.  Dilated intrahepatic biliary tree.    3.  No stones proximal to the stricture were identified.    4.  Slight dilatation of the distal pancreatic duct without a discrete stricture identified.    IMAGIN18 CT Abdomen Pelvis Stone Protocol   Findings  - LOWER THORAX: Clear. No effusions.  - LIVER: Homogeneous. No focal hepatic mass or ductal dilatation.  - GALLBLADDER: MINIMAL STRANDING AROUND REGION OF GALLBLADDER FOSSA  THAT MAY BE POSTSURGICAL.  - PANCREAS: Unremarkable. No mass or ductal dilatation.  - SPLEEN: Homogeneous. No splenomegaly.  - ADRENALS: No mass.  - KIDNEYS: No mass. No obstructive uropathy.  No evidence of urolithiasis.  - GI TRACT: SMALL HIATAL HERNIA.  - PERITONEUM: No free air. No free fluid or loculated fluid collections.  - MESENTERY: Unremarkable.  - LYMPH NODES: No lymphadenopathy.  - VASCULATURE: ATHEROSCLEROTIC VASCULAR CALCIFICATION.  - ABDOMINAL WALL: No focal hernia or mass.  - OTHER: None.  - BLADDER: No focal mass or significant wall thickening  - REPRODUCTIVE: Unremarkable as visualized.  - APPENDIX: Nondistended. No surrounding inflammation.  - BONES: No acute bony abnormality.     IMPRESSION:  - Minimal stranding around region of gallbladder fossa that may be postsurgical.     18 US Liver   FINDINGS:   - Visualized pancreas is unremarkable.   - The gallbladder is absent. No collection identified.   - The CBD measures [id number] mm    .  - The liver demonstrates normal echogenicity without focal lesion.    - No ascites demonstrated.        IMPRESSION:  - As above.    18 CT Abdomen Pelvis Stone Protocol   FINDINGS:   - Minimal bibasilar atelectasis.  - Hiatal  hernia.  - The liver is homogeneous. There is no evidence of focal hepatic mass.  - The spleen is homogeneous.  - Stent is noted traversing the common bile duct.  - 3 mm nodule in the right lung on image 8 of the axial series.  - There is no adrenal enlargement.  - The kidneys show no evidence of hydronephrosis or hydroureter. I do not see any distal ureteral stones.   - Otherwise I do not see any free fluid or walled off fluid collections.  - There is moderate to large volume stool in the colon.  - There is no evidence of mesenteric or retroperitoneal adenopathy.     IMPRESSION:  1. Tiny nodule in the right lung base.  2. Minimal bibasilar atelectasis.  3. Moderate to large volume stool in the colon.     Other findings as above.    08-22-18 CT Abdomen Pelvis With Contrast   FINDINGS:   - Tiny left basilar nodule measuring 4 mm on image 14 of the axial series.  - Minimal bibasilar atelectasis.  - The liver is homogeneous. There is no evidence of focal hepatic mass  - The spleen is homogeneous  - Indistinct appearance of the pancreatic head. A biliary stent is present.  - Small left adrenal nodule is present.  - The kidneys show no evidence of hydronephrosis or hydroureter. I do not see any distal ureteral stones.   - Otherwise I do not see any free fluid or walled off fluid collections.  - Large volume stool is present in the colon.     IMPRESSION:  1. Slightly enlarged, indistinct appearance of the pancreatic head.  - Underlying neoplasm certainly not excluded but no delineable mass is present. There is a biliary stent.    2. Tiny nodule in the left lung base.    3. Small left adrenal nodule.    4. Hiatal hernia.    5. Moderate to large volume stool in the colon.      09-11-18 CT Chest Without Contrast   FINDINGS:   - Minimal coronary artery calcifications present.  - No pericardial or pleural effusions.  - No parenchymal soft tissue nodules or masses. There are findings of COPD.     IMPRESSION:  - COPD.  - Hiatal  hernia.  - Minimal coronary artery calcification.       09-11-18 CT Abdomen Pelvis Without Contrast   FINDINGS:   - Lung bases show mild increased interstitial markings.  - There is a hiatal hernia.  - The liver is homogeneous. There is no evidence of focal hepatic mass.  - The spleen is homogeneous.  - Mildly indistinct appearance of the pancreas. There is a biliary stent present. I cannot exclude mild degree of pancreatitis..  - There is no adrenal enlargement.  - The kidneys show no evidence of hydronephrosis or hydroureter. I do not see any distal ureteral stones.   - Otherwise I do not see any free fluid or walled off fluid collections.  - Large volume stool is present in the colon.  - Urinary bladder wall is slightly thickened.  - There is no evidence of mesenteric or retroperitoneal adenopathy.    IMPRESSION:  1. Mildly indistinct appearance of the proximal pancreas.  2. Urinary bladder wall thickening.  3. Large volume stool in the colon.    09-14-18 CT Abdomen Without Contrast   FINDINGS:   - Again noted is very mild indistinct appearance of the pancreatic head. Stent is stable in position.  - The liver is stable and homogeneous.  - Spleen is homogeneous.  - No adrenal enlargement.  - Moderate to large volume stool in the colon.     IMPRESSION:  - No significant interval change since the previous exam.     09-21-18 NM Pet Skull Base To Mid Thigh   FINDINGS:      HEAD/NECK:  - No FDG hypermetabolic neck adenopathy.  - No FDG hypermetabolic masses.     CHEST:   - No FDG hypermetabolic thoracic adenopathy.  - No FDG hypermetabolic lung nodules or masses.  - Evaluation for tiny parenchymal nodules is somewhat limited on low dose CT secondary to respiratory motion.     ABDOMEN/PELVIS:   - There is hypermetabolic activity in the pancreatic head with a maximum SUV of 4.971.  - Physiologic FDG hypermetabolism seen throughout the GI tract.  - Physiologic FDG hypermetabolism seen throughout the mesentery,  retroperitoneum and pelvis.     BONES:   - There are scattered foci of FDG hypermetabolism most suggestive of degenerative change seen throughout the axial skeleton. If concern persist for metastatic lesions of the bone, HDP Bone scan is recommended for further evaluation.     IMPRESSION:  - Abnormal hypermetabolic activity corresponding to area of indistinctness in the pancreatic head. Maximum SUV is 4.97.    1-20-19 CT CAP with contrast:  Today's study shows mild coronary artery calcifications.     There are bilateral pleural effusions, right greater than left.     There is bibasilar consolidation. I cannot exclude pneumonia.     No parenchymal masses are seen.     IMPRESSION:  Bibasilar effusions and bibasilar consolidation.     There are bibasilar pleural effusions and there is bibasilar  consolidation.     Patient has a percutaneous gastric tube.     There is pneumoperitoneum. This may be from the percutaneous gastric  tube. The tube appears to remain intraluminal but left lateral aspect  could extend beyond the confines of the bowel.     There is a perihepatic fluid collection which contains air and is  concerning for perihepatic abscess.     The liver is homogeneous. There is no evidence of focal hepatic mass     The spleen is homogeneous     There is no peripancreatic stranding or pancreatic head mass.     There is no adrenal enlargement.     The kidneys show no evidence of hydronephrosis or hydroureter. I do not  see any distal ureteral stones.      Small volume ascites is present.     There is no bowel wall thickening or mesenteric stranding.             IMPRESSION:  :   1. Bibasilar effusions and bibasilar consolidation.  2. Loculated perihepatic fluid collection containing air. This is  concerning for an abscess.  3. Percutaneous gastric tube is present. The tip appears to remain  intraluminal. There is, however, single focus of pneumoperitoneum which  may be associated with gastric tube placement.  4.  Small volume ascites.    CT A/P without Contrast 1-24-19:  The percutaneous drain placed on 01/11/2019 and the  perihepatic abscess has been removed. There is persistent loculated  perihepatic fluid although this has markedly improved since prior to  drain placement; the collection did measure 8 x 22 x 9 cm before  drainage as one large collection and now is split into two possibly  separate smaller collections, the more posterior of the two measuring 2  x 10 x 2 cm and the more superior 4 x 6 x 3 cm (the collections may be  narrowly connected).     Percutaneous gastrojejunostomy is in place; status post Whipple. There  is a small amount of low density abdominopelvic free fluid. There is gas  in the nondependent portion of the urinary bladder, probably from recent  instrumentation. There is no evidence of bowel obstruction. Only a tiny  locule of free air persists in the mid abdomen deep to the midline  incision, improved. The kidneys, adrenal glands, pancreas and spleen are  unchanged.     There is mild body wall edema and subcutaneous gas from recent  medication injections. Small volume right and trace volume left pleural  effusions. There is near complete collapse of the right lower lobe but  there is only subsegmental atelectasis at the left lung base. No  pertinent osseous abnormality is seen.     IMPRESSION:  1. Marked improvement in the perihepatic subcapsular abscess. Drainage  catheter has been removed.  2. Unchanged low density free fluid. Unchanged small volume right and  trace left pleural effusions.    RECENT LABS:  Lab Results   Component Value Date    WBC 9.64 03/05/2019    HGB 9.6 (L) 03/05/2019    HCT 31.3 (L) 03/05/2019    MCV 84.4 03/05/2019    RDW 16.2 (H) 03/05/2019     (H) 03/05/2019    NEUTRORELPCT 71.5 03/05/2019    LYMPHORELPCT 20.7 03/05/2019    MONORELPCT 6.8 03/05/2019    EOSRELPCT 0.4 03/05/2019    BASORELPCT 0.2 03/05/2019    NEUTROABS 6.88 (H) 03/05/2019    LYMPHSABS 2.00  03/05/2019       Lab Results   Component Value Date     03/05/2019    K 4.2 03/05/2019    CO2 22.2 (L) 03/05/2019     03/05/2019    BUN 20 03/05/2019    CREATININE 1.17 03/05/2019    EGFRIFNONA 62 03/05/2019    EGFRIFAFRI  09/02/2016      Comment:      <15 Indicative of kidney failure.    GLUCOSE 161 (H) 03/05/2019    CALCIUM 9.4 03/05/2019    ALKPHOS 128 03/05/2019    AST 17 03/05/2019    ALT 12 03/05/2019    BILITOT 0.6 03/05/2019    ALBUMIN 3.70 03/05/2019    PROTEINTOT 8.0 03/05/2019    MG 1.8 02/01/2019    PHOS 2.8 01/26/2019     Lab Results   Component Value Date    FERRITIN 435.00 (H) 02/20/2019    IRON 11 (L) 02/20/2019    TIBC 196 (L) 02/20/2019    LABIRON 6 (L) 02/20/2019    [id number] 1,361 (H) 02/20/2019    FOLATE 19.70 02/20/2019       Lab Results   Component Value Date     85 (H) 02/20/2019     34 01/22/2019     104 (H) 01/07/2019     1349 (H) 12/19/2018     1089 (H) 12/10/2018     1576 (H) 11/13/2018     5149 (H) 10/19/2018     7602 (H) 09/25/2018     3636 (H) 09/07/2018     4032 (H) 08/15/2018     ASSESSMENT & PLAN:  Todd Phillips is a very pleasant 68 y.o. male with Stage III moderately differentiated adenocarcinoma of the head of the pancreas with involvement of the duodenum ([id number]).    1.  Pancreatic cancer:  -  Imaging has shown vague abnormality in the head of the pancreas which is hypermetabolic on PET-CT.  Biopsy was c/w pancreatic cancer and Ca19-9 is markedly elevated.    -  Biliary stent was exchanged for metal stent to relieve obstruction / cholangitis.   -Recommended neoadjuvant chemotherapy with Gemcitabine and Abraxane and he completed one cycle.  He became very weak and had to have TPN support.   -  He underwent surgical resection 12-31-18 and had Stage III disease ([id number]) moderately differentiated adenocarcinoma of the pancreas.  Tumor was 3.7 cm in maximal dimension and 5/15 associated LNs were involved.  Uncinate and  SMA margins were involved.    -  There is question about whether he should take further therapy but at this point he is still very weak / recovering.   -  Will continue to monitor and help him recover and continue with supportive care.  -  CT imaging showed no cause for concern.   has been up and down a bit.  Will need to follow this carefully.    2.  N/V/Dehydration:  -  Will continue IVF q MWF this week and then discontinue as his intake is improved.    -  Po intake is  Much better - eating meals 3x/day as well as snacks and 2 cans of suppplements/day.  -  Continue Zofran as needed and compazine as needed.    3.  3.  Generalized weakness / dehabilitation:    -  He will continue to work on nutrition.  -  Will continue with PT/OT.  Will give script for rollator walker because he has sometimes been falling and often needs to sit down quickly.       4.  Neoplasm related pain:  -  Continue Oxycodone as needed.      5.  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  He previously took oral iron but did not absorb it.  Will give Feraheme.    6.  Constipation: Continue Senna/ Colace ii BID with Miralax as needed.       7.   History of Atrial fibrillation:  Chronically anticoagulated on Eliquis.      8.  Prophylaxis:  Has had 2018 influenza vaccine.       9.  ACO Quality measures  - Todd Phillips does not use tobacco products.  - Current outpatient and discharge medications have been reconciled for the patient.  Reviewed by:Gwen Alves MD     This note was scribed for Gwen Alves MD by Tamara Morejon RN.    I, Gwen Alves MD, personally performed the services described in this documentation as scribed by the above named individual in my presence, and it is both accurate and complete.  03/05/2019        I spent 30 minutes with Todd Phillips today.  More than 50% of the time was spent in counseling / coordination of care for the above problems.      Electronically Signed by: Gwen  MD Joselyn        CC:   MD Miquel Quintana MD Aaron House, MD Michael Simons, MD

## 2019-03-06 DIAGNOSIS — C25.0 MALIGNANT NEOPLASM OF HEAD OF PANCREAS (HCC): Primary | ICD-10-CM

## 2019-03-08 ENCOUNTER — DOCUMENTATION (OUTPATIENT)
Dept: ONCOLOGY | Facility: HOSPITAL | Age: 69
End: 2019-03-08

## 2019-03-08 NOTE — PROGRESS NOTES
SS received a order to arrange rollator walker per Tamara Morejon.  SS spoke with pt's spouse Emmy this date who states that patient has a rolling walker that he received about five months ago when he was discharged from the hospital.  SS explained to spouse that insurance will not cover a new walker for five years.  Spouse states understanding and states that pt uses his walker good.  SS will follow.

## 2019-03-20 ENCOUNTER — HOSPITAL ENCOUNTER (OUTPATIENT)
Dept: GENERAL RADIOLOGY | Facility: HOSPITAL | Age: 69
Discharge: HOME | End: 2019-03-20

## 2019-03-20 ENCOUNTER — OFFICE VISIT (OUTPATIENT)
Dept: ONCOLOGY | Facility: CLINIC | Age: 69
End: 2019-03-20

## 2019-03-20 ENCOUNTER — INFUSION (OUTPATIENT)
Dept: ONCOLOGY | Facility: HOSPITAL | Age: 69
End: 2019-03-20
Attending: INTERNAL MEDICINE

## 2019-03-20 ENCOUNTER — HOSPITAL ENCOUNTER (OUTPATIENT)
Dept: GENERAL RADIOLOGY | Facility: HOSPITAL | Age: 69
Discharge: HOME | End: 2019-03-20
Admitting: INTERNAL MEDICINE

## 2019-03-20 VITALS
DIASTOLIC BLOOD PRESSURE: 66 MMHG | SYSTOLIC BLOOD PRESSURE: 95 MMHG | RESPIRATION RATE: 18 BRPM | HEART RATE: 75 BPM | OXYGEN SATURATION: 96 % | TEMPERATURE: 97 F

## 2019-03-20 VITALS
RESPIRATION RATE: 18 BRPM | HEART RATE: 75 BPM | SYSTOLIC BLOOD PRESSURE: 95 MMHG | TEMPERATURE: 97 F | OXYGEN SATURATION: 96 % | DIASTOLIC BLOOD PRESSURE: 66 MMHG

## 2019-03-20 DIAGNOSIS — C25.0 MALIGNANT NEOPLASM OF HEAD OF PANCREAS (HCC): ICD-10-CM

## 2019-03-20 DIAGNOSIS — R11.2 INTRACTABLE VOMITING WITH NAUSEA, UNSPECIFIED VOMITING TYPE: ICD-10-CM

## 2019-03-20 DIAGNOSIS — G89.3 NEOPLASM RELATED PAIN: Primary | ICD-10-CM

## 2019-03-20 DIAGNOSIS — C25.0 MALIGNANT NEOPLASM OF HEAD OF PANCREAS (HCC): Primary | ICD-10-CM

## 2019-03-20 DIAGNOSIS — E86.0 DEHYDRATION: ICD-10-CM

## 2019-03-20 LAB
ALBUMIN SERPL-MCNC: 3.55 G/DL (ref 3.5–5.2)
ALBUMIN/GLOB SERPL: 0.8 G/DL
ALP SERPL-CCNC: 122 U/L (ref 39–117)
ALT SERPL W P-5'-P-CCNC: 14 U/L (ref 1–41)
ANION GAP SERPL CALCULATED.3IONS-SCNC: 10.3 MMOL/L
AST SERPL-CCNC: 23 U/L (ref 1–40)
BASOPHILS # BLD AUTO: 0.03 10*3/MM3 (ref 0–0.2)
BASOPHILS NFR BLD AUTO: 0.4 % (ref 0–1.5)
BILIRUB SERPL-MCNC: 0.5 MG/DL (ref 0.2–1.2)
BUN BLD-MCNC: 21 MG/DL (ref 8–23)
BUN/CREAT SERPL: 15.1 (ref 7–25)
CALCIUM SPEC-SCNC: 9.7 MG/DL (ref 8.6–10.5)
CANCER AG19-9 SERPL-ACNC: 129.6 U/ML
CHLORIDE SERPL-SCNC: 103 MMOL/L (ref 98–107)
CO2 SERPL-SCNC: 24.7 MMOL/L (ref 22–29)
CREAT BLD-MCNC: 1.39 MG/DL (ref 0.76–1.27)
DEPRECATED RDW RBC AUTO: 53.9 FL (ref 37–54)
EOSINOPHIL # BLD AUTO: 0.05 10*3/MM3 (ref 0–0.4)
EOSINOPHIL NFR BLD AUTO: 0.7 % (ref 0.3–6.2)
ERYTHROCYTE [DISTWIDTH] IN BLOOD BY AUTOMATED COUNT: 17.6 % (ref 12.3–15.4)
GFR SERPL CREATININE-BSD FRML MDRD: 51 ML/MIN/1.73
GLOBULIN UR ELPH-MCNC: 4.7 GM/DL
GLUCOSE BLD-MCNC: 130 MG/DL (ref 65–99)
HCT VFR BLD AUTO: 33.4 % (ref 37.5–51)
HGB BLD-MCNC: 10.4 G/DL (ref 13–17.7)
IMM GRANULOCYTES # BLD AUTO: 0.04 10*3/MM3 (ref 0–0.05)
IMM GRANULOCYTES NFR BLD AUTO: 0.6 % (ref 0–0.5)
LYMPHOCYTES # BLD AUTO: 2.45 10*3/MM3 (ref 0.7–3.1)
LYMPHOCYTES NFR BLD AUTO: 34.5 % (ref 19.6–45.3)
MCH RBC QN AUTO: 26.1 PG (ref 26.6–33)
MCHC RBC AUTO-ENTMCNC: 31.1 G/DL (ref 31.5–35.7)
MCV RBC AUTO: 83.7 FL (ref 79–97)
MONOCYTES # BLD AUTO: 0.77 10*3/MM3 (ref 0.1–0.9)
MONOCYTES NFR BLD AUTO: 10.8 % (ref 5–12)
NEUTROPHILS # BLD AUTO: 3.76 10*3/MM3 (ref 1.4–7)
NEUTROPHILS NFR BLD AUTO: 53 % (ref 42.7–76)
PLATELET # BLD AUTO: 304 10*3/MM3 (ref 140–450)
PMV BLD AUTO: 9.7 FL (ref 6–12)
POTASSIUM BLD-SCNC: 4.1 MMOL/L (ref 3.5–5.2)
PROT SERPL-MCNC: 8.2 G/DL (ref 6–8.5)
RBC # BLD AUTO: 3.99 10*6/MM3 (ref 4.14–5.8)
SODIUM BLD-SCNC: 138 MMOL/L (ref 136–145)
WBC NRBC COR # BLD: 7.1 10*3/MM3 (ref 3.4–10.8)

## 2019-03-20 PROCEDURE — 71046 X-RAY EXAM CHEST 2 VIEWS: CPT | Performed by: RADIOLOGY

## 2019-03-20 PROCEDURE — 96360 HYDRATION IV INFUSION INIT: CPT

## 2019-03-20 PROCEDURE — 99214 OFFICE O/P EST MOD 30 MIN: CPT | Performed by: INTERNAL MEDICINE

## 2019-03-20 PROCEDURE — 74019 RADEX ABDOMEN 2 VIEWS: CPT | Performed by: RADIOLOGY

## 2019-03-20 PROCEDURE — 80053 COMPREHEN METABOLIC PANEL: CPT | Performed by: INTERNAL MEDICINE

## 2019-03-20 PROCEDURE — 71046 X-RAY EXAM CHEST 2 VIEWS: CPT

## 2019-03-20 PROCEDURE — 74018 RADEX ABDOMEN 1 VIEW: CPT

## 2019-03-20 PROCEDURE — 86301 IMMUNOASSAY TUMOR CA 19-9: CPT | Performed by: INTERNAL MEDICINE

## 2019-03-20 PROCEDURE — 85025 COMPLETE CBC W/AUTO DIFF WBC: CPT | Performed by: INTERNAL MEDICINE

## 2019-03-20 RX ORDER — SODIUM CHLORIDE 0.9 % (FLUSH) 0.9 %
10 SYRINGE (ML) INJECTION AS NEEDED
Status: CANCELLED | OUTPATIENT
Start: 2019-03-20

## 2019-03-20 RX ORDER — OXYCODONE HYDROCHLORIDE 5 MG/1
TABLET ORAL
Qty: 100 TABLET | Refills: 0 | Status: SHIPPED | OUTPATIENT
Start: 2019-03-20 | End: 2019-05-28 | Stop reason: SDUPTHER

## 2019-03-20 RX ORDER — SODIUM CHLORIDE 0.9 % (FLUSH) 0.9 %
10 SYRINGE (ML) INJECTION AS NEEDED
Status: DISCONTINUED | OUTPATIENT
Start: 2019-03-20 | End: 2019-03-20 | Stop reason: HOSPADM

## 2019-03-20 RX ADMIN — HEPARIN SODIUM (PORCINE) LOCK FLUSH IV SOLN 100 UNIT/ML 500 UNITS: 100 SOLUTION at 16:52

## 2019-03-20 RX ADMIN — SODIUM CHLORIDE 1000 ML: 9 INJECTION, SOLUTION INTRAVENOUS at 15:41

## 2019-03-20 RX ADMIN — SODIUM CHLORIDE, PRESERVATIVE FREE 10 ML: 5 INJECTION INTRAVENOUS at 16:52

## 2019-03-20 NOTE — PROGRESS NOTES
NAME: Todd Phillips    : 1950    DATE : 19    DIAGNOSIS:   1.   Stage III moderately differentiated adenocarcinoma of the head of the pancreas with involvement of the duodenum ([id number]).    2.  Repeated episodes of bacteremia due to cholangitis -    3.  Refractory nausea/vomiting    CHIEF COMPLAINT:  Follow up of pancreatic cancer    TREATMENT:  1.       2.  On 18, Dr. Brody performed  Pancreaticoduodenectomy, wedge biopsy of the liver, portal vein resection and lateral repair and gastrojejunostomy tube placement    HISTORY OF PRESENT ILLNESS:   Todd Phillips is a very pleasant 68 y.o. male who is being seen today at the request of Dr. Miquel Brody for evaluation and treatment of pancreatic cancer.  Mr. Phillips initially developed symptoms in 2018. At that time he had pain in his upper abdomen which would radiate to his back.  In July, the pain intensified.  He was seen in the ER and also by Dr. Su.  He underwent RUQ U/S and then HIDA scan and it was determined he had a dysfunctional gallbladder.  He saw Dr. Downs and had cholecystectomy on 8-10-18.  Pathology showed chronic cholecystitis and an incidental benign lymph node.  He re-presented in the post-operative period with jaundice.  Dr. Su performed ERCP on18.  He was noted to have a 3 cm irregular tight stricture of the distal common bile duct with proximal biliary ductal dilatation.  Biliary papillotomy and stricture dilatation performed and brushings taken.  A 10 Fr x 5 cm biliary stent was placed.  Brushings were taken but were negative.  There was sl dilatation of the distal pancreatic duct without discrete stricture.   He was discharged with improving jaundice on .  On  he represented with sepsis due to Klebsiella pneumonia bacteremia and was admitted.  He then was referred to Dr. Brody for evaluation and treatment.  He has been set up for EUS with biopsy next Friday for what appears to be a primary  pancreatic malignancy in the head of the pancreas.  Dr. Brody has referred him for consideration of neoadjuvant therapy.    INTERVAL HISTORY:  Mr. Phillips is here today with his wife for follow up of pancreatic cancer. Since his last visit, he has taken a turn backward.  He had been eating 3 meals/day as well as multiple snacks and BID supplements.  Sometimes he was even waking up for a midnight snack. Over the last 3 weeks or so, he has had difficulty with increasing nausea, vomiting and abdominal pain.  Particularly when he eats, he develops severe, 10/10 abdominal pain.  The pain is diffuse / non-specific.  Occasionally he develops nausea or pain independent of eating.  His wife notes that when she bathes him she has to be very, very gentle over his abdomen because minimal pressure causes him a lot of pain. He has been eating noodle soup, spinach cooked with eggs, butter milk and occasionally plain potato or corn.  These are the only foods he has been able to tolerate.  He can't tolerate the Ensure but sometimes can tolerate [place] Instant breakfast. His weight has been steady. Physically he is getting stronger.  Often able to walk without his walker.       PAST MEDICAL HISTORY:  Past Medical History:   Diagnosis Date   • Antral gastritis    • Asthma    • Atrial fibrillation (CMS/HCC)     treated with oral blood thinner   • Chest pain    • COPD (chronic obstructive pulmonary disease) (CMS/HCC)    • Coronary artery disease     no stents   • Diabetes mellitus (CMS/HCC)     type 2 - checks sugar 4 times per day    • Duodenitis    • Elevated cholesterol    • Emphysema of lung (CMS/HCC)    • Gallbladder disease     removed    • GERD (gastroesophageal reflux disease)    • Hard to intubate     busted lip last time   • Hyperlipidemia    • Hypertension    • Jaundice    • Kidney stone    • Kidney stones     had lithotripsy and passed 36 kidney stones as well as had one surgically removed.   • Malignant neoplasm of  head of pancreas (CMS/HCC) 9/5/2018   • Nausea    • Obstructive sleep apnea on CPAP     compliant with machine    • Palpitations    • Pneumonia 08/2018   • Reflux esophagitis    • Renal failure     stage 3 kidney disease   • Sepsis (CMS/HCC)        PAST SURGICAL HISTORY:  Past Surgical History:   Procedure Laterality Date   • BILE DUCT STENT PLACEMENT      [facility] 2 weeks ago    • CARDIAC CATHETERIZATION  11/01/1999   • CARDIOVASCULAR STRESS TEST  09/2012   • CHOLECYSTECTOMY N/A 8/10/2018    Procedure: CHOLECYSTECTOMY LAPAROSCOPIC;  Surgeon: Ronak Downs MD;  Location:  [facility];  Service: General   • COLONOSCOPY     • CYSTOSCOPY BLADDER STONE LITHOTRIPSY     • ECHO - CONVERTED  09/2012   • ERCP N/A 8/14/2018    Procedure: ENDOSCOPIC RETROGRADE CHOLANGIOPANCREATOGRAPHY WITH PAPILLOTOMY;  Surgeon: Scot Su MD;  Location:  [facility];  Service: Gastroenterology   • ERCP N/A 10/1/2018    Procedure: ENDOSCOPIC RETROGRADE CHOLANGIOPANCREATOGRAPHY;  Surgeon: Jefry Luna MD;  Location:  [facility];  Service: Gastroenterology   • KIDNEY STONE SURGERY     • KIDNEY STONE SURGERY      open abdominal surgery   • PORTACATH PLACEMENT Right 10/23/2018    Procedure: INSERTION OF PORTACATH;  Surgeon: Ronak Downs MD;  Location:  [facility];  Service: General   • TONSILLECTOMY     • UPPER GASTROINTESTINAL ENDOSCOPY  08/30/2012   • WHIPPLE PROCEDURE N/A 12/31/2018    Procedure: WHIPPLE PROCEDURE, BIOPSY OF LIVER, PORTAL VEIN RESECTION AND REPAIR, G/J TUBE PLACEMENT;  Surgeon: Miquel Brody MD;  Location:  [facility];  Service: General       FAMILY HISTORY:  Family History   Problem Relation Age of Onset   • Heart attack Mother    • Heart disease Mother    • Stroke Mother    • Kidney disease Mother    • Heart failure Mother    • Lung disease Father    • Tuberculosis Father    • Diabetes Sister    • Heart attack Brother    • Heart failure Brother    • Heart disease  Brother    • Diabetes Sister    • No Known Problems Brother    • No Known Problems Brother        SOCIAL HISTORY:  Social History     Socioeconomic History   • Marital status:      Spouse name: Not on file   • Number of children: Not on file   • Years of education: Not on file   • Highest education level: Not on file   Tobacco Use   • Smoking status: Never Smoker   • Smokeless tobacco: Never Used   Substance and Sexual Activity   • Alcohol use: No   • Drug use: No   • Sexual activity: Defer     Partners: Female   Social History Narrative    He is  and lives alone with his wife although they have 3 children together who all live close by. He is retired from the [employer] and was exposed to Agent Orange during . He is a lifelong nonsmoker.         A DIL  of cancer.      REVIEW OF SYSTEMS:   A comprehensive 14 point review of systems was performed.  Significant findings as mentioned above.  All other systems reviewed and are negative.      MEDICATIONS:  The current medication list was reviewed in the EMR    Current Outpatient Medications:   •  albuterol (PROVENTIL HFA;VENTOLIN HFA) 108 (90 BASE) MCG/ACT inhaler, Inhale 2 puffs Every 4 (Four) Hours As Needed for Wheezing or Shortness of Air., Disp: , Rfl:   •  albuterol (PROVENTIL) (2.5 MG/3ML) 0.083% nebulizer solution, Take 2.5 mg by nebulization 2 (Two) Times a Day As Needed for Wheezing or Shortness of Air., Disp: , Rfl:   •  allopurinol (ZYLOPRIM) 100 MG tablet, Take 1 tablet by mouth Daily., Disp: 90 tablet, Rfl: 0  •  apixaban (ELIQUIS) 5 MG tablet tablet, Take 1 tablet by mouth Every 12 (Twelve) Hours., Disp: 60 tablet, Rfl:   •  cholecalciferol (VITAMIN D3) 1000 units tablet, Take 1,000 Units by mouth Daily., Disp: , Rfl:   •  colchicine 0.6 MG tablet, Take 1 tablet by mouth Daily. (Patient taking differently: Take 0.6 mg by mouth As Needed.), Disp: 90 tablet, Rfl: 2  •  flecainide (TAMBOCOR) 50 MG tablet, Take 1 tablet by mouth Every  12 (Twelve) Hours., Disp: 60 tablet, Rfl: 0  •  FLUoxetine (PROzac) 20 MG capsule, Take 20 mg by mouth 2 (Two) Times a Day., Disp: , Rfl:   •  insulin lispro (humaLOG) 100 UNIT/ML injection, Inject  under the skin into the appropriate area as directed 3 (Three) Times a Day Before Meals., Disp: , Rfl:   •  lactobacillus acidophilus (RISAQUAD) capsule capsule, Take 1 capsule by mouth Daily., Disp: 30 capsule, Rfl: 0  •  lansoprazole (PREVACID) 30 MG capsule, Take 30 mg by mouth Daily., Disp: , Rfl:   •  metoprolol tartrate (LOPRESSOR) 25 MG tablet, Take 12.5 mg by mouth Every 12 (Twelve) Hours., Disp: , Rfl:   •  mometasone (ASMANEX) 220 MCG/INH inhaler, Inhale 2 puffs Every Night., Disp: , Rfl:   •  montelukast (SINGULAIR) 10 MG tablet, Take 10 mg by mouth Every Night., Disp: , Rfl:   •  nitroglycerin (NITROSTAT) 0.4 MG SL tablet, Place 0.4 mg under the tongue as needed for chest pain., Disp: , Rfl:   •  ondansetron (ZOFRAN) 8 MG tablet, Take 1 tablet by mouth Every 8 (Eight) Hours As Needed for Nausea or Vomiting., Disp: 60 tablet, Rfl: 3  •  oxyCODONE (ROXICODONE) 5 MG immediate release tablet, Take 5 mg by mouth Every 4 (Four) Hours As Needed for Moderate Pain ., Disp: , Rfl:   •  polyethylene glycol (MIRALAX) packet, Take 17 g by mouth Daily As Needed., Disp: , Rfl:   •  promethazine (PHENERGAN) 12.5 MG tablet, Take 1 tablet by mouth Every 6 (Six) Hours As Needed for Nausea or Vomiting., Disp: 90 tablet, Rfl: 3  •  terazosin (HYTRIN) 2 MG capsule, Take 1 capsule by mouth Every Night., Disp: 30 capsule, Rfl: 0  •  Tiotropium Bromide-Olodaterol 2.5-2.5 MCG/ACT aerosol solution, Inhale 2 puffs Daily., Disp: , Rfl:   No current facility-administered medications for this visit.     Facility-Administered Medications Ordered in Other Visits:   •  sodium chloride 0.9 % infusion  - ADS Override Pull, , , ,   •  sodium chloride 0.9 % infusion  - ADS Override Pull, , , ,     ALLERGIES:    Allergies   Allergen Reactions   •  Ativan [Lorazepam] Mental Status Change   • Chlorhexidine Hives and Rash   • Contrast Dye Other (See Comments)     Can't have due to kidney failure per family   • Fentanyl Confusion and Unknown (See Comments)     Patient family reports extreme symptoms with fentanyl. Including confusion, restlessness, irritability an heavy sedation.       PHYSICAL EXAM:  Vitals:    03/20/19 1353   BP: 95/66   Pulse: 75   Resp: 18   Temp: 97 °F (36.1 °C)   SpO2: 96%   General:  Alert and oriented.  Appears uncomfortable today.  HEENT:  Pupils are equal, round and reactive to light and accommodation, Extra-ocular movements full, Oropharyx clear, MM dry  Neck:  No JVD, thyromegaly or lymphadenopathy  CV:  Regular rate/rhythm, no murmurs, rubs or gallops  Resp:  Lungs are clear to auscultation bilaterally  Abd:  Soft, diffuse, non-specific TTP, non-distended, bowel sounds normal, no organomegaly or masses.  Surgical incisions well-healed.  Ext:  No clubbing, cyanosis or edema  Lymph:  No cervical, supraclavicular, axillary,adenopathy  Neuro: grossly non-focal exam    PATHOLOGY:  08-15-18         12-31-18 Whipple  Final Diagnosis   1. LIVER, WEDGE BIOPSY:  Small bland ductular proliferation compatible with bile duct adenoma.   Negative for metastatic carcinoma.   2. OMENTUM:  Mild chronic inflammation and surface adhesions; negative for neoplasm.   3. HEPATIC ARTERY LYMPH NODE:  Sinus histiocytosis; single lymph node negative for metastatic carcinoma. (0/1)  4. SUBMITTED BILE DUCT MARGIN:  Margin with chronic inflammation; negative for adenocarcinoma.   5. AORTA-CAVAL LYMPH NODES:  Two lymph nodes negative for metastatic carcinoma. (0/2)  6. PANCREATODUODENECTOMY (WHIPPLE PROCEDURE);  Invasive moderately differentiated adenocarcinoma arising in head of pancreas and involving duodenum.  Gross tumor size 3.7 cm in greatest dimension.  Perineural and peripancreatic extension identified.   Soft tissue at superior mesenteric artery positive  for neoplasm.  Posterior/uncinate margin involved by neoplasm.  Five of twelve peripancreatic lymph nodes positive for metastatic neoplasm. See Template.        PANCREAS EXOCRINE:  TYPE OF SPECIMEN/PROCEDURE: Whipple procedure  SPECIMEN INTEGRITY: Intact  TUMOR SITE (HEAD, BODY, TAIL): Head  TUMOR SIZE (GREATEST DIMENSION): 3.7 cm greatest dimension  HISTOLOGIC TYPE: Adenocarcinoma  HISTOLOGIC stGstRstAstDstEst:st st1st TUMOR EXTENSION: Neoplasm involves duodenum and peripancreatic fat  SURGICAL MARGINS, IF INVOLVED, (SPECIFY WHERE): Involved  MARGINS EXAMINED: See below  PARENCHYMAL MARGIN: Not involved  UNCINATE: Involved  BILE DUCT: Not involved  DISTAL MARGIN: Not involved   PROXIMAL MARGIN: Not involved   OTHER MARGINS: SMA margin involved   CLOSEST MARGIN: Margins involved    SPECIFIC MARGIN: SMA and posterior/uncinate  INVADES CELIAC AXIS OR SMA: No  VASCULAR/LYMPHATIC INVASION: Present  PERINEURAL INVASION:  Present  REGIONAL LYMPH NODES: Submitted  NUMBER OF LYMPH NODES INVOLVED: 5  NUMBER OF LYMPH NODES EXAMINED: 15  EXTRACAPSULAR EXTENSION: Focally present  TREATMENT EFFECT: No known presurgical therapy  ADDITIONAL PATHOLOGIC FINDINGS: Chronic pancreatitis   OTHER STUDIES: Available upon request  AJCC PATHOLOGIC STAGE: (COMPLETED BY PATHOLOGIST BASED ONLY ON TISSUE FINDINGS, MORE EXTENSIVE DISEASE MAY NOT BE KNOW TO THE PATHOLOGIST)  pT=  2   pN= 2  AJCC PATHOLOGIC STAGE: III     1-11-19 Abdominal Fluid:  Final Diagnosis    ABDOMINAL FLUID:  Negative for malignancy.  Acute inflammation and necrotic debris.  No tumor seen.         ENDOSCOPY:  08-14-18 ERCP with papillotomy, balloon rotation of the biliary stricture, pathology brushings, ileum stent placement ([place])  1.  Irregular 3 cm distal common bile duct stricture concerning for neoplastic disease. Biliary papillotomy, stricture dilatation by  through the scope balloon, cytology brushings performed and 10 [place] by 5 cm biliary stent placed.    2.  Dilated common  hepatic duct to 15 mm when fully contrast filled.  Dilated intrahepatic biliary tree.    3.  No stones proximal to the stricture were identified.    4.  Slight dilatation of the distal pancreatic duct without a discrete stricture identified.    IMAGIN18 CT Abdomen Pelvis Stone Protocol   Findings  - LOWER THORAX: Clear. No effusions.  - LIVER: Homogeneous. No focal hepatic mass or ductal dilatation.  - GALLBLADDER: MINIMAL STRANDING AROUND REGION OF GALLBLADDER FOSSA  THAT MAY BE POSTSURGICAL.  - PANCREAS: Unremarkable. No mass or ductal dilatation.  - SPLEEN: Homogeneous. No splenomegaly.  - ADRENALS: No mass.  - KIDNEYS: No mass. No obstructive uropathy.  No evidence of urolithiasis.  - GI TRACT: SMALL HIATAL HERNIA.  - PERITONEUM: No free air. No free fluid or loculated fluid collections.  - MESENTERY: Unremarkable.  - LYMPH NODES: No lymphadenopathy.  - VASCULATURE: ATHEROSCLEROTIC VASCULAR CALCIFICATION.  - ABDOMINAL WALL: No focal hernia or mass.  - OTHER: None.  - BLADDER: No focal mass or significant wall thickening  - REPRODUCTIVE: Unremarkable as visualized.  - APPENDIX: Nondistended. No surrounding inflammation.  - BONES: No acute bony abnormality.     IMPRESSION:  - Minimal stranding around region of gallbladder fossa that may be postsurgical.     18 US Liver   FINDINGS:   - Visualized pancreas is unremarkable.   - The gallbladder is absent. No collection identified.   - The CBD measures 10.17814209272 mm    .  - The liver demonstrates normal echogenicity without focal lesion.    - No ascites demonstrated.        IMPRESSION:  - As above.    18 CT Abdomen Pelvis Stone Protocol   FINDINGS:   - Minimal bibasilar atelectasis.  - Hiatal hernia.  - The liver is homogeneous. There is no evidence of focal hepatic mass.  - The spleen is homogeneous.  - Stent is noted traversing the common bile duct.  - 3 mm nodule in the right lung on image 8 of the axial series.  - There is no adrenal  enlargement.  - The kidneys show no evidence of hydronephrosis or hydroureter. I do not see any distal ureteral stones.   - Otherwise I do not see any free fluid or walled off fluid collections.  - There is moderate to large volume stool in the colon.  - There is no evidence of mesenteric or retroperitoneal adenopathy.     IMPRESSION:  1. Tiny nodule in the right lung base.  2. Minimal bibasilar atelectasis.  3. Moderate to large volume stool in the colon.     Other findings as above.    08-22-18 CT Abdomen Pelvis With Contrast   FINDINGS:   - Tiny left basilar nodule measuring 4 mm on image 14 of the axial series.  - Minimal bibasilar atelectasis.  - The liver is homogeneous. There is no evidence of focal hepatic mass  - The spleen is homogeneous  - Indistinct appearance of the pancreatic head. A biliary stent is present.  - Small left adrenal nodule is present.  - The kidneys show no evidence of hydronephrosis or hydroureter. I do not see any distal ureteral stones.   - Otherwise I do not see any free fluid or walled off fluid collections.  - Large volume stool is present in the colon.     IMPRESSION:  1. Slightly enlarged, indistinct appearance of the pancreatic head.  - Underlying neoplasm certainly not excluded but no delineable mass is present. There is a biliary stent.    2. Tiny nodule in the left lung base.    3. Small left adrenal nodule.    4. Hiatal hernia.    5. Moderate to large volume stool in the colon.      09-11-18 CT Chest Without Contrast   FINDINGS:   - Minimal coronary artery calcifications present.  - No pericardial or pleural effusions.  - No parenchymal soft tissue nodules or masses. There are findings of COPD.     IMPRESSION:  - COPD.  - Hiatal hernia.  - Minimal coronary artery calcification.       09-11-18 CT Abdomen Pelvis Without Contrast   FINDINGS:   - Lung bases show mild increased interstitial markings.  - There is a hiatal hernia.  - The liver is homogeneous. There is no evidence  of focal hepatic mass.  - The spleen is homogeneous.  - Mildly indistinct appearance of the pancreas. There is a biliary stent present. I cannot exclude mild degree of pancreatitis..  - There is no adrenal enlargement.  - The kidneys show no evidence of hydronephrosis or hydroureter. I do not see any distal ureteral stones.   - Otherwise I do not see any free fluid or walled off fluid collections.  - Large volume stool is present in the colon.  - Urinary bladder wall is slightly thickened.  - There is no evidence of mesenteric or retroperitoneal adenopathy.    IMPRESSION:  1. Mildly indistinct appearance of the proximal pancreas.  2. Urinary bladder wall thickening.  3. Large volume stool in the colon.    09-14-18 CT Abdomen Without Contrast   FINDINGS:   - Again noted is very mild indistinct appearance of the pancreatic head. Stent is stable in position.  - The liver is stable and homogeneous.  - Spleen is homogeneous.  - No adrenal enlargement.  - Moderate to large volume stool in the colon.     IMPRESSION:  - No significant interval change since the previous exam.     09-21-18 NM Pet Skull Base To Mid Thigh   FINDINGS:      HEAD/NECK:  - No FDG hypermetabolic neck adenopathy.  - No FDG hypermetabolic masses.     CHEST:   - No FDG hypermetabolic thoracic adenopathy.  - No FDG hypermetabolic lung nodules or masses.  - Evaluation for tiny parenchymal nodules is somewhat limited on low dose CT secondary to respiratory motion.     ABDOMEN/PELVIS:   - There is hypermetabolic activity in the pancreatic head with a maximum SUV of 4.971.  - Physiologic FDG hypermetabolism seen throughout the GI tract.  - Physiologic FDG hypermetabolism seen throughout the mesentery, retroperitoneum and pelvis.     BONES:   - There are scattered foci of FDG hypermetabolism most suggestive of degenerative change seen throughout the axial skeleton. If concern persist for metastatic lesions of the bone, HDP Bone scan is recommended for  further evaluation.     IMPRESSION:  - Abnormal hypermetabolic activity corresponding to area of indistinctness in the pancreatic head. Maximum SUV is 4.97.    1-20-19 CT CAP with contrast:  Today's study shows mild coronary artery calcifications.     There are bilateral pleural effusions, right greater than left.     There is bibasilar consolidation. I cannot exclude pneumonia.     No parenchymal masses are seen.     IMPRESSION:  Bibasilar effusions and bibasilar consolidation.     There are bibasilar pleural effusions and there is bibasilar  consolidation.     Patient has a percutaneous gastric tube.     There is pneumoperitoneum. This may be from the percutaneous gastric  tube. The tube appears to remain intraluminal but left lateral aspect  could extend beyond the confines of the bowel.     There is a perihepatic fluid collection which contains air and is  concerning for perihepatic abscess.     The liver is homogeneous. There is no evidence of focal hepatic mass     The spleen is homogeneous     There is no peripancreatic stranding or pancreatic head mass.     There is no adrenal enlargement.     The kidneys show no evidence of hydronephrosis or hydroureter. I do not  see any distal ureteral stones.      Small volume ascites is present.     There is no bowel wall thickening or mesenteric stranding.             IMPRESSION:  :   1. Bibasilar effusions and bibasilar consolidation.  2. Loculated perihepatic fluid collection containing air. This is  concerning for an abscess.  3. Percutaneous gastric tube is present. The tip appears to remain  intraluminal. There is, however, single focus of pneumoperitoneum which  may be associated with gastric tube placement.  4. Small volume ascites.    CT A/P without Contrast 1-24-19:  The percutaneous drain placed on 01/11/2019 and the  perihepatic abscess has been removed. There is persistent loculated  perihepatic fluid although this has markedly improved since prior  to  drain placement; the collection did measure 8 x 22 x 9 cm before  drainage as one large collection and now is split into two possibly  separate smaller collections, the more posterior of the two measuring 2  x 10 x 2 cm and the more superior 4 x 6 x 3 cm (the collections may be  narrowly connected).     Percutaneous gastrojejunostomy is in place; status post Whipple. There  is a small amount of low density abdominopelvic free fluid. There is gas  in the nondependent portion of the urinary bladder, probably from recent  instrumentation. There is no evidence of bowel obstruction. Only a tiny  locule of free air persists in the mid abdomen deep to the midline  incision, improved. The kidneys, adrenal glands, pancreas and spleen are  unchanged.     There is mild body wall edema and subcutaneous gas from recent  medication injections. Small volume right and trace volume left pleural  effusions. There is near complete collapse of the right lower lobe but  there is only subsegmental atelectasis at the left lung base. No  pertinent osseous abnormality is seen.     IMPRESSION:  1. Marked improvement in the perihepatic subcapsular abscess. Drainage  catheter has been removed.  2. Unchanged low density free fluid. Unchanged small volume right and  trace left pleural effusions.    CTCAP 02-26-19:  Findings  LUNGS: BIBASILAR LUNG FIBROTIC CHANGE.  HEART: Unremarkable.  PERICARDIUM: No effusion.  MEDIASTINUM: No masses. No enlarged lymph nodes.  No fluid collections.  PLEURA: TINY LEFT PLEURAL EFFUSION.  MAJOR AIRWAYS: Clear. No intrinsic mass.  VASCULATURE: No evidence of aneurysm.     VISUALIZED UPPER ABDOMEN:  LIVER: Homogeneous. No focal hepatic mass or ductal dilatation.  SPLEEN: Homogeneous. No splenomegaly.  ADRENALS: No mass.  KIDNEYS: No mass. No obstructive uropathy.  No evidence of urolithiasis.  GI TRACT: Non-dilated. No definite wall thickening  PERITONEUM: No free air. No free fluid or loculated fluid  collections.  ABDOMINAL WALL: No focal hernia or mass.       OTHER: None.     BONES: No acute bony abnormality.     IMPRESSION:  Now only tiny pleural effusions.    Findings  LOWER THORAX: Clear. No effusions.     ABDOMEN:  LIVER: SUBCAPSULE LIVER COLLECTION MEASURING ABOUT 4.8 CM THAT WAS ABOUT 7.2 CM.  GALLBLADDER: No dilation or stone identified.  PANCREAS: Unremarkable. No mass or ductal dilatation.  SPLEEN: Homogeneous. No splenomegaly.  ADRENALS: No mass.  KIDNEYS: No mass. No obstructive uropathy.  No evidence of urolithiasis.  GI TRACT: Non-dilated. No definite wall thickening.  PERITONEUM: TINY ASCITES.  MESENTERY: Unremarkable.  LYMPH NODES: No lymphadenopathy.  VASCULATURE: No evidence of aneurysm.  ABDOMINAL WALL: No focal hernia or mass.     OTHER: None.     PELVIS:  BLADDER: No focal mass or significant wall thickening  REPRODUCTIVE: Unremarkable as visualized.  APPENDIX: Nondistended. No surrounding inflammation.  BONES: No acute bony abnormality.     IMPRESSION:  Still tiny ascites. Decreased size of a subhepatic collection.    RECENT LABS:  Lab Results   Component Value Date    WBC 7.10 03/20/2019    HGB 10.4 (L) 03/20/2019    HCT 33.4 (L) 03/20/2019    MCV 83.7 03/20/2019    RDW 17.6 (H) 03/20/2019     03/20/2019    NEUTRORELPCT 53.0 03/20/2019    LYMPHORELPCT 34.5 03/20/2019    MONORELPCT 10.8 03/20/2019    EOSRELPCT 0.7 03/20/2019    BASORELPCT 0.4 03/20/2019    NEUTROABS 3.76 03/20/2019    LYMPHSABS 2.45 03/20/2019       Lab Results   Component Value Date     03/20/2019    K 4.1 03/20/2019    CO2 24.7 03/20/2019     03/20/2019    BUN 21 03/20/2019    CREATININE 1.39 (H) 03/20/2019    EGFRIFNONA 51 (L) 03/20/2019    EGFRIFAFRI  09/02/2016      Comment:      <15 Indicative of kidney failure.    GLUCOSE 130 (H) 03/20/2019    CALCIUM 9.7 03/20/2019    ALKPHOS 122 (H) 03/20/2019    AST 23 03/20/2019    ALT 14 03/20/2019    BILITOT 0.5 03/20/2019    ALBUMIN 3.55 03/20/2019     PROTEINTOT 8.2 03/20/2019    MG 1.8 02/01/2019    PHOS 2.8 01/26/2019     Lab Results   Component Value Date    FERRITIN 435.00 (H) 02/20/2019    IRON 11 (L) 02/20/2019    TIBC 196 (L) 02/20/2019    LABIRON 6 (L) 02/20/2019    BBHXCIJJ68 1,361 (H) 02/20/2019    FOLATE 19.70 02/20/2019       Lab Results   Component Value Date     85 (H) 02/20/2019     34 01/22/2019     104 (H) 01/07/2019     1349 (H) 12/19/2018     1089 (H) 12/10/2018     1576 (H) 11/13/2018     5149 (H) 10/19/2018     7602 (H) 09/25/2018     3636 (H) 09/07/2018     4032 (H) 08/15/2018     ASSESSMENT & PLAN:  Todd Phillips is a very pleasant 68 y.o. male with Stage III moderately differentiated adenocarcinoma of the head of the pancreas with involvement of the duodenum ([id number]).    1.  Pancreatic cancer:  -  Imaging has shown vague abnormality in the head of the pancreas which is hypermetabolic on PET-CT.  Biopsy was c/w pancreatic cancer and Ca19-9 was markedly elevated.    -  Biliary stent was exchanged for metal stent to relieve obstruction / cholangitis.   -Recommended neoadjuvant chemotherapy with Gemcitabine and Abraxane and he completed one cycle.  He became very weak and had to have TPN support.   -  He underwent surgical resection 12-31-18 and had Stage III disease ([id number]) moderately differentiated adenocarcinoma of the pancreas.  Tumor was 3.7 cm in maximal dimension and 5/15 associated LNs were involved.  Uncinate and SMA margins were involved.    -  There has been question about whether he should take further therapy (chemotherapy and / or radiation), but he has been so weak that he hasn't been able.  -  CT imaging showed no cause for concern.   has been up and down a bit but last value was up.  -  I am very concerned about his symptoms.  -  Will get AXR/CXR today and will order PET-CT to evaluate the source of his abdominal pain. If this is not revealing, may need to return him  "to Dr. Brody for further evaluation.  -  Repeat CA 19-9 level drawn today and is pending.    2.  N/V/Dehydration:  -  Will give NS 1L today in the office and ask home health to give 1L tomorrow and the next day.  -  I am concerned that po intake is falling off.    -  Continue Zofran 2-3 x/day.  NP ordered Reglan.  He felt it \"made him weaker\" but will try it again.      3. Generalized weakness / dehabilitation:    - Doing better in terms of physical strength and is steadier on his feet.     4.  Neoplasm related pain:  -   Will restart Oxycodone as needed.      5.  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  He previously took oral iron but did not absorb it.  Ordered Feraheme. Hb is sl improved.  Will complete Feraheme and repeat dosing    6.  Constipation: Continue Senna/ Colace ii BID with Miralax as needed.       7.   History of Atrial fibrillation:  Chronically anticoagulated on Eliquis.      8.  Prophylaxis:  Has had 2018 influenza vaccine.       9.  ACO Quality measures  - Todd Phillips does not use tobacco products.  - Current outpatient and discharge medications have been reconciled for the patient.  Reviewed by:Gwen Alves MD     This note was scribed for Gwen Alves MD by Tamara Morejon RN.    I, Gwen Alves MD, personally performed the services described in this documentation as scribed by the above named individual in my presence, and it is both accurate and complete.  03/20/2019        I spent 30 minutes with Todd Phillips today.  More than 50% of the time was spent in counseling / coordination of care for the above problems.      Electronically Signed by: Gwen Alves MD        CC:   MD Miquel Quintana MD Aaron House, MD Michael Simons, MD         "

## 2019-03-26 ENCOUNTER — OFFICE VISIT (OUTPATIENT)
Dept: CARDIOLOGY | Facility: CLINIC | Age: 69
End: 2019-03-26

## 2019-03-26 VITALS
RESPIRATION RATE: 16 BRPM | HEIGHT: 72 IN | OXYGEN SATURATION: 73 % | SYSTOLIC BLOOD PRESSURE: 120 MMHG | DIASTOLIC BLOOD PRESSURE: 80 MMHG | HEART RATE: 94 BPM | BODY MASS INDEX: 23.84 KG/M2 | WEIGHT: 176 LBS

## 2019-03-26 DIAGNOSIS — Z79.01 CHRONIC ANTICOAGULATION: Chronic | ICD-10-CM

## 2019-03-26 DIAGNOSIS — E78.2 MIXED HYPERLIPIDEMIA: Chronic | ICD-10-CM

## 2019-03-26 DIAGNOSIS — I10 ESSENTIAL HYPERTENSION: Chronic | ICD-10-CM

## 2019-03-26 DIAGNOSIS — I25.10 NONOBSTRUCTIVE ATHEROSCLEROSIS OF CORONARY ARTERY: ICD-10-CM

## 2019-03-26 DIAGNOSIS — I48.91 ATRIAL FIBRILLATION, UNSPECIFIED TYPE (HCC): Primary | Chronic | ICD-10-CM

## 2019-03-26 PROCEDURE — 93000 ELECTROCARDIOGRAM COMPLETE: CPT | Performed by: INTERNAL MEDICINE

## 2019-03-26 PROCEDURE — 99213 OFFICE O/P EST LOW 20 MIN: CPT | Performed by: INTERNAL MEDICINE

## 2019-03-26 RX ORDER — OMEPRAZOLE 40 MG/1
80 CAPSULE, DELAYED RELEASE ORAL EVERY MORNING
COMMUNITY

## 2019-03-26 NOTE — PROGRESS NOTES
subjective     Chief Complaint   Patient presents with   • Atrial Fibrillation     History of Present Illness    Patient is 68 years old white male with multiple chronic medical problems.  Patient has malignant neoplasm of the head of the pancreas.  He recently had a Whipple's procedure.  He is seeing Dr. Alves.  Patient gets markedly nauseated despite Zofran.  He has been receiving hyperalimentation and is feeling stronger.    Patient has paroxysmal atrial fibrillation.  He is maintaining sinus rhythm on flecainide therapy.  He is anticoagulated with Eliquis.  He is taking it 5 twice daily.  There are no drug side effects.  Heart rate is controlled with metoprolol tartrate 12.5 twice daily  At this time patient is not requiring any medications for hypertension or hyperlipidemia.  He also has hyperuricemia and gout he is taking allopurinol 100 mg daily.    Biggest problem at this time is nausea and lack of appetite.  He complains of being weak tired and fatigued.      Past Surgical History:   Procedure Laterality Date   • BILE DUCT STENT PLACEMENT      [facility] 2 weeks ago    • CARDIAC CATHETERIZATION  11/01/1999   • CARDIOVASCULAR STRESS TEST  09/2012   • CHOLECYSTECTOMY N/A 8/10/2018    Procedure: CHOLECYSTECTOMY LAPAROSCOPIC;  Surgeon: Ronak Downs MD;  Location: [facility] OR;  Service: General   • COLONOSCOPY     • CYSTOSCOPY BLADDER STONE LITHOTRIPSY     • ECHO - CONVERTED  09/2012   • ERCP N/A 8/14/2018    Procedure: ENDOSCOPIC RETROGRADE CHOLANGIOPANCREATOGRAPHY WITH PAPILLOTOMY;  Surgeon: Scot Su MD;  Location: [facility] OR;  Service: Gastroenterology   • ERCP N/A 10/1/2018    Procedure: ENDOSCOPIC RETROGRADE CHOLANGIOPANCREATOGRAPHY;  Surgeon: Jefry Luna MD;  Location: [facility] ENDOSCOPY;  Service: Gastroenterology   • KIDNEY STONE SURGERY     • KIDNEY STONE SURGERY      open abdominal surgery   • PORTACATH PLACEMENT Right 10/23/2018    [facility]  Surgeon: Ronak Downs MD;  Location: [facility] OR;  Service: General   • TONSILLECTOMY     • UPPER GASTROINTESTINAL ENDOSCOPY  08/30/2012   • WHIPPLE PROCEDURE N/A 12/31/2018    Procedure: WHIPPLE PROCEDURE, BIOPSY OF LIVER, PORTAL VEIN RESECTION AND REPAIR, G/J TUBE PLACEMENT;  Surgeon: Miquel Brody MD;  Location: [facility] OR;  Service: General     Family History   Problem Relation Age of Onset   • Heart attack Mother    • Heart disease Mother    • Stroke Mother    • Kidney disease Mother    • Heart failure Mother    • Lung disease Father    • Tuberculosis Father    • Diabetes Sister    • Heart attack Brother    • Heart failure Brother    • Heart disease Brother    • Diabetes Sister    • No Known Problems Brother    • No Known Problems Brother      Past Medical History:   Diagnosis Date   • Antral gastritis    • Asthma    • Atrial fibrillation (CMS/[facility])     treated with oral blood thinner   • Chest pain    • COPD (chronic obstructive pulmonary disease) (CMS/[facility])    • Coronary artery disease     no stents   • Diabetes mellitus (CMS/[facility])     type 2 - checks sugar 4 times per day    • Duodenitis    • Elevated cholesterol    • Emphysema of lung (CMS/[facility])    • Gallbladder disease     removed    • GERD (gastroesophageal reflux disease)    • Hard to intubate     busted lip last time   • Hyperlipidemia    • Hypertension    • Jaundice    • Kidney stone    • Kidney stones     had lithotripsy and passed 36 kidney stones as well as had one surgically removed.   • Malignant neoplasm of head of pancreas (CMS/[facility]) 9/5/2018   • Nausea    • Obstructive sleep apnea on CPAP     compliant with machine    • Palpitations    • Pneumonia 08/2018   • Reflux esophagitis    • Renal failure     stage 3 kidney disease   • Sepsis (CMS/[facility])      Patient Active Problem List   Diagnosis   • Hyperlipidemia   • COPD (chronic obstructive pulmonary disease) (CMS/[facility])   • Essential hypertension   • Gout without tophus   •  Anxiety and depression   • Primary insomnia   • Gastroesophageal reflux disease without esophagitis   • Nonobstructive atherosclerosis of coronary artery   • CKD stage 3 secondary to diabetes (CMS/HCC)   • DM2 (diabetes mellitus, type 2) (CMS/[facility])   • Paroxysmal atrial fibrillation   • Chronic anticoagulation, Eliquis   • Encounter for monitoring flecainide therapy   • Malignant neoplasm of head of pancreas (CMS/HCC)   • Bacteremia due to Escherichia coli   • Biliary obstruction   • Thrombocytopenia (CMS/HCC)   • Anemia due to chemotherapy   • Fever   • Post-operative confusion and somnolence, likely due to oversedation   • Atrial fibrillation with RVR (CMS/HCC)   • Adult necrotizing enterocolitis (CMS/HCC)   • Encephalopathy   • Intra-abdominal abscess (S/P Percutaneous drain)   • Metabolic encephalopathy   • Dehydration   • Nausea   • Iron deficiency anemia secondary to inadequate dietary iron intake   • Malabsorption of iron       Social History     Tobacco Use   • Smoking status: Never Smoker   • Smokeless tobacco: Never Used   Substance Use Topics   • Alcohol use: No   • Drug use: No       Allergies   Allergen Reactions   • Ativan [Lorazepam] Mental Status Change   • Chlorhexidine Hives and Rash   • Contrast Dye Other (See Comments)     Can't have due to kidney failure per family   • Fentanyl Confusion and Unknown (See Comments)     Patient family reports extreme symptoms with fentanyl. Including confusion, restlessness, irritability an heavy sedation.       Current Outpatient Medications on File Prior to Visit   Medication Sig   • albuterol (PROVENTIL HFA;VENTOLIN HFA) 108 (90 BASE) MCG/ACT inhaler Inhale 2 puffs Every 4 (Four) Hours As Needed for Wheezing or Shortness of Air.   • albuterol (PROVENTIL) (2.5 MG/3ML) 0.083% nebulizer solution Take 2.5 mg by nebulization 2 (Two) Times a Day As Needed for Wheezing or Shortness of Air.   • allopurinol (ZYLOPRIM) 100 MG tablet Take 1 tablet by mouth Daily.   •  apixaban (ELIQUIS) 5 MG tablet tablet Take 1 tablet by mouth Every 12 (Twelve) Hours.   • cholecalciferol (VITAMIN D3) 1000 units tablet Take 1,000 Units by mouth Daily.   • colchicine 0.6 MG tablet Take 1 tablet by mouth Daily. (Patient taking differently: Take 0.6 mg by mouth As Needed.)   • flecainide (TAMBOCOR) 50 MG tablet Take 1 tablet by mouth Every 12 (Twelve) Hours.   • FLUoxetine (PROzac) 20 MG capsule Take 20 mg by mouth 2 (Two) Times a Day.   • insulin lispro (humaLOG) 100 UNIT/ML injection Inject  under the skin into the appropriate area as directed 3 (Three) Times a Day Before Meals.   • lactobacillus acidophilus (RISAQUAD) capsule capsule Take 1 capsule by mouth Daily.   • metoprolol tartrate (LOPRESSOR) 25 MG tablet Take 12.5 mg by mouth Every 12 (Twelve) Hours.   • mometasone (ASMANEX) 220 MCG/INH inhaler Inhale 2 puffs Every Night.   • montelukast (SINGULAIR) 10 MG tablet Take 10 mg by mouth Every Night.   • nitroglycerin (NITROSTAT) 0.4 MG SL tablet Place 0.4 mg under the tongue as needed for chest pain.   • omeprazole (priLOSEC) 40 MG capsule Take 40 mg by mouth Daily.   • ondansetron (ZOFRAN) 8 MG tablet Take 1 tablet by mouth Every 8 (Eight) Hours As Needed for Nausea or Vomiting.   • oxyCODONE (ROXICODONE) 5 MG immediate release tablet Take 1-2 tabs every 4-6 hours as needed for pain   • pancrelipase, Lip-Prot-Amyl, (CREON) 36089 units capsule delayed-release particles capsule Take 12,000 units of lipase by mouth 3 (Three) Times a Day With Meals.   • polyethylene glycol (MIRALAX) packet Take 17 g by mouth Daily As Needed.   • promethazine (PHENERGAN) 12.5 MG tablet Take 1 tablet by mouth Every 6 (Six) Hours As Needed for Nausea or Vomiting.   • Tiotropium Bromide-Olodaterol 2.5-2.5 MCG/ACT aerosol solution Inhale 2 puffs Daily.   • [DISCONTINUED] lansoprazole (PREVACID) 30 MG capsule Take 30 mg by mouth Daily.   • [DISCONTINUED] terazosin (HYTRIN) 2 MG capsule Take 1 capsule by mouth Every  "Night.     Current Facility-Administered Medications on File Prior to Visit   Medication   • sodium chloride 0.9 % infusion  - ADS Override Pull   • sodium chloride 0.9 % infusion  - ADS Override Pull         The following portions of the patient's history were reviewed and updated as appropriate: allergies, current medications, past family history, past medical history, past social history, past surgical history and problem list.    Review of Systems   Constitution: Positive for decreased appetite and malaise/fatigue. Negative for chills and fever.   HENT: Negative.  Negative for congestion.    Eyes: Negative.    Cardiovascular: Negative.  Negative for chest pain, cyanosis, dyspnea on exertion, irregular heartbeat, leg swelling, near-syncope, orthopnea, palpitations, paroxysmal nocturnal dyspnea and syncope.   Respiratory: Negative.  Negative for shortness of breath.    Hematologic/Lymphatic: Negative.    Skin: Negative.    Musculoskeletal: Negative.    Gastrointestinal: Positive for nausea.   Genitourinary: Negative.    Neurological: Negative.  Negative for headaches.          Objective:     /80 (BP Location: Left arm)   Pulse 94   Resp 16   Ht 182.9 cm (72\")   Wt 79.8 kg (176 lb)   SpO2 (!) 73%   BMI 23.87 kg/m²   Physical Exam   Constitutional: He appears well-developed and well-nourished. No distress.   HENT:   Head: Normocephalic and atraumatic.   Mouth/Throat: Oropharynx is clear and moist. No oropharyngeal exudate.   Eyes: Conjunctivae and EOM are normal. Pupils are equal, round, and reactive to light. No scleral icterus.   Neck: Normal range of motion. Neck supple. No JVD present. No tracheal deviation present. No thyromegaly present.   Cardiovascular: Normal rate, regular rhythm, normal heart sounds and intact distal pulses. PMI is not displaced. Exam reveals no gallop, no friction rub and no decreased pulses.   No murmur heard.  Pulses:       Carotid pulses are 3+ on the right side, and 3+ on " the left side.       Radial pulses are 3+ on the right side, and 3+ on the left side.   Pulmonary/Chest: Effort normal and breath sounds normal. No respiratory distress. He has no wheezes. He has no rales. He exhibits no tenderness.   Abdominal: Soft. Bowel sounds are normal. He exhibits no distension, no abdominal bruit and no mass. There is no splenomegaly or hepatomegaly. There is no tenderness. There is no rebound and no guarding.   Musculoskeletal: Normal range of motion. He exhibits no edema, tenderness or deformity.   Lymphadenopathy:     He has no cervical adenopathy.   Neurological: He is alert. He has normal reflexes. No cranial nerve deficit. He exhibits normal muscle tone. Coordination normal.   Skin: Skin is warm and dry. No rash noted. He is not diaphoretic. No erythema.   Psychiatric: He has a normal mood and affect. His behavior is normal. Judgment and thought content normal.         Lab Review  Lab Results   Component Value Date     03/20/2019    K 4.1 03/20/2019     03/20/2019    BUN 21 03/20/2019    CREATININE 1.39 (H) 03/20/2019    GLUCOSE 130 (H) 03/20/2019    CALCIUM 9.7 03/20/2019    ALT 14 03/20/2019    ALKPHOS 122 (H) 03/20/2019    LABIL2 1.5 03/25/2016     Lab Results   Component Value Date    CKTOTAL 24 (L) 02/01/2019     Lab Results   Component Value Date    WBC 7.10 03/20/2019    HGB 10.4 (L) 03/20/2019    HCT 33.4 (L) 03/20/2019     03/20/2019     Lab Results   Component Value Date    INR 1.35 (H) 01/24/2019    INR 1.55 (H) 01/21/2019    INR 1.05 01/11/2019     Lab Results   Component Value Date    MG 1.8 02/01/2019     Lab Results   Component Value Date    PSA 1.450 07/11/2017    TSH 1.502 11/18/2018     Lab Results   Component Value Date    BNP 50.0 01/26/2019     Lab Results   Component Value Date    CHLPL 144 03/25/2016    CHOL 53 01/16/2019    TRIG 57 01/21/2019    HDL 20 (L) 01/16/2019    VLDL 16.8 01/16/2019    LDLHDL 0.81 01/16/2019     Lab Results   Component  Value Date    LDL 16 01/16/2019    LDL 52 11/18/2018         ECG 12 Lead  Date/Time: 3/26/2019 2:03 PM  Performed by: Adiel Denney MD  Authorized by: Adiel Denney MD   Comparison: compared with previous ECG from 1/19/2019  Similar to previous ECG  Comparison to previous ECG: Nonspecific ST changes in the anterior leads are no longer noted  Rhythm: sinus rhythm  Rate: normal  BPM: 89  Conduction: conduction normal  ST Segments: ST segments normal  T Waves: T waves normal  QRS axis: normal  Other: no other findings    Clinical impression: normal ECG               I personally viewed and interpreted the patient's LAB data         Assessment:     1. Atrial fibrillation, unspecified type (CMS/HCC)    2. Mixed hyperlipidemia    3. Essential hypertension    4. Nonobstructive atherosclerosis of coronary artery    5. Chronic anticoagulation, Eliquis          Plan:     Lab work reviewed and discussed with the patient    Lipid panel is normal  Hemoglobin is 10.4.  Sugar is 130 at this time.  Blood pressure is normal without medications.  Renal functions are abnormal but stable.  Patient was advised to try taking Zofran prior to eating because he gets severely nauseated and eating.  Overall I feel that patient is doing well  EKG does not show any acute changes and is maintaining sinus rhythm patient will continue Eliquis, flecainide, metoprolol.    No Follow-up on file.

## 2019-03-27 ENCOUNTER — INFUSION (OUTPATIENT)
Dept: ONCOLOGY | Facility: HOSPITAL | Age: 69
End: 2019-03-27
Attending: INTERNAL MEDICINE

## 2019-03-27 ENCOUNTER — DOCUMENTATION (OUTPATIENT)
Dept: ONCOLOGY | Facility: HOSPITAL | Age: 69
End: 2019-03-27

## 2019-03-27 ENCOUNTER — OFFICE VISIT (OUTPATIENT)
Dept: ONCOLOGY | Facility: CLINIC | Age: 69
End: 2019-03-27

## 2019-03-27 VITALS
RESPIRATION RATE: 18 BRPM | WEIGHT: 176.5 LBS | SYSTOLIC BLOOD PRESSURE: 79 MMHG | BODY MASS INDEX: 23.94 KG/M2 | RESPIRATION RATE: 18 BRPM | OXYGEN SATURATION: 97 % | TEMPERATURE: 97.9 F | OXYGEN SATURATION: 97 % | DIASTOLIC BLOOD PRESSURE: 55 MMHG | TEMPERATURE: 97.9 F | SYSTOLIC BLOOD PRESSURE: 79 MMHG | HEART RATE: 71 BPM | DIASTOLIC BLOOD PRESSURE: 55 MMHG | HEART RATE: 71 BPM | WEIGHT: 176.5 LBS | BODY MASS INDEX: 23.94 KG/M2

## 2019-03-27 DIAGNOSIS — E86.0 DEHYDRATION: Primary | ICD-10-CM

## 2019-03-27 DIAGNOSIS — K59.00 CONSTIPATION, UNSPECIFIED CONSTIPATION TYPE: ICD-10-CM

## 2019-03-27 DIAGNOSIS — K90.9 MALABSORPTION OF IRON: ICD-10-CM

## 2019-03-27 DIAGNOSIS — G89.3 NEOPLASM RELATED PAIN: ICD-10-CM

## 2019-03-27 DIAGNOSIS — R11.0 NAUSEA: ICD-10-CM

## 2019-03-27 DIAGNOSIS — C25.0 MALIGNANT NEOPLASM OF HEAD OF PANCREAS (HCC): ICD-10-CM

## 2019-03-27 DIAGNOSIS — D50.8 IRON DEFICIENCY ANEMIA SECONDARY TO INADEQUATE DIETARY IRON INTAKE: ICD-10-CM

## 2019-03-27 DIAGNOSIS — D64.9 ANEMIA, UNSPECIFIED TYPE: ICD-10-CM

## 2019-03-27 DIAGNOSIS — I95.9 HYPOTENSION, UNSPECIFIED HYPOTENSION TYPE: ICD-10-CM

## 2019-03-27 DIAGNOSIS — D50.9 IRON DEFICIENCY ANEMIA, UNSPECIFIED IRON DEFICIENCY ANEMIA TYPE: ICD-10-CM

## 2019-03-27 PROCEDURE — 96374 THER/PROPH/DIAG INJ IV PUSH: CPT

## 2019-03-27 PROCEDURE — 85025 COMPLETE CBC W/AUTO DIFF WBC: CPT | Performed by: INTERNAL MEDICINE

## 2019-03-27 PROCEDURE — 25010000002 FERUMOXYTOL 510 MG/17ML SOLUTION 510 MG VIAL: Performed by: INTERNAL MEDICINE

## 2019-03-27 PROCEDURE — 96361 HYDRATE IV INFUSION ADD-ON: CPT

## 2019-03-27 PROCEDURE — 99214 OFFICE O/P EST MOD 30 MIN: CPT | Performed by: NURSE PRACTITIONER

## 2019-03-27 PROCEDURE — 80053 COMPREHEN METABOLIC PANEL: CPT | Performed by: INTERNAL MEDICINE

## 2019-03-27 RX ORDER — ATENOLOL 25 MG/1
25 TABLET ORAL DAILY PRN
Status: ON HOLD | COMMUNITY
End: 2019-04-22

## 2019-03-27 RX ORDER — SODIUM CHLORIDE 0.9 % (FLUSH) 0.9 %
10 SYRINGE (ML) INJECTION AS NEEDED
Status: DISCONTINUED | OUTPATIENT
Start: 2019-03-27 | End: 2019-03-27 | Stop reason: HOSPADM

## 2019-03-27 RX ORDER — SODIUM CHLORIDE 0.9 % (FLUSH) 0.9 %
10 SYRINGE (ML) INJECTION AS NEEDED
Status: CANCELLED | OUTPATIENT
Start: 2019-04-03

## 2019-03-27 RX ADMIN — SODIUM CHLORIDE, PRESERVATIVE FREE 500 UNITS: 5 INJECTION INTRAVENOUS at 15:14

## 2019-03-27 RX ADMIN — SODIUM CHLORIDE 1000 ML: 9 INJECTION, SOLUTION INTRAVENOUS at 13:58

## 2019-03-27 RX ADMIN — FERUMOXYTOL 510 MG: 510 INJECTION INTRAVENOUS at 14:26

## 2019-03-27 RX ADMIN — SODIUM CHLORIDE, PRESERVATIVE FREE 10 ML: 5 INJECTION INTRAVENOUS at 15:14

## 2019-03-27 NOTE — PROGRESS NOTES
SS received in basket per:    From: Tamara Morejon RN   Sent: 3/5/2019   3:06 PM   To: Gwen Alves MD, Jackie Bill   Subject: home health ivf                            Can you please call this patient's home health and see if they will take an order from us to please discontinue IVF when he is finished with what he has at home. Dr. Brody's office started the IVF so I don't know if they will let us d/c them.     SS contacted [facility] [phone] per John to request for IV fluids to be discontinued.  SS will follow.

## 2019-03-27 NOTE — PROGRESS NOTES
SS spoke with Laura this date.  Laura request for SS to arrange IV fluids twice a week for patient until further notice.  SS contacted [facility] [phone] per John to arrange IV Normal Saline fluids.  Fluids to be delivered to pt's home for start of service tomorrow for twice weekly.  SS contacted [facility] [phone] per Aisha to arrange for pt to receive IV fluids. SS will follow as needed.

## 2019-03-29 ENCOUNTER — HOSPITAL ENCOUNTER (OUTPATIENT)
Dept: PET IMAGING | Facility: HOSPITAL | Age: 69
Discharge: HOME | End: 2019-03-29
Admitting: INTERNAL MEDICINE

## 2019-03-29 DIAGNOSIS — C25.0 MALIGNANT NEOPLASM OF HEAD OF PANCREAS (HCC): ICD-10-CM

## 2019-03-29 PROCEDURE — A9552 F18 FDG: HCPCS | Performed by: INTERNAL MEDICINE

## 2019-03-29 PROCEDURE — 0 FLUDEOXYGLUCOSE F18 SOLUTION: Performed by: INTERNAL MEDICINE

## 2019-03-29 PROCEDURE — 78815 PET IMAGE W/CT SKULL-THIGH: CPT | Performed by: RADIOLOGY

## 2019-03-29 PROCEDURE — 78815 PET IMAGE W/CT SKULL-THIGH: CPT

## 2019-03-29 RX ADMIN — FLUDEOXYGLUCOSE F18 1 DOSE: 300 INJECTION INTRAVENOUS at 09:37

## 2019-04-03 ENCOUNTER — OFFICE VISIT (OUTPATIENT)
Dept: ONCOLOGY | Facility: CLINIC | Age: 69
End: 2019-04-03

## 2019-04-03 ENCOUNTER — INFUSION (OUTPATIENT)
Dept: ONCOLOGY | Facility: HOSPITAL | Age: 69
End: 2019-04-03
Attending: INTERNAL MEDICINE

## 2019-04-03 ENCOUNTER — LAB (OUTPATIENT)
Dept: ONCOLOGY | Facility: CLINIC | Age: 69
End: 2019-04-03

## 2019-04-03 VITALS
HEART RATE: 97 BPM | WEIGHT: 180.3 LBS | SYSTOLIC BLOOD PRESSURE: 104 MMHG | DIASTOLIC BLOOD PRESSURE: 70 MMHG | RESPIRATION RATE: 18 BRPM | TEMPERATURE: 98 F | BODY MASS INDEX: 24.45 KG/M2 | OXYGEN SATURATION: 98 %

## 2019-04-03 VITALS
WEIGHT: 180.3 LBS | BODY MASS INDEX: 24.45 KG/M2 | OXYGEN SATURATION: 98 % | TEMPERATURE: 98 F | RESPIRATION RATE: 18 BRPM | SYSTOLIC BLOOD PRESSURE: 104 MMHG | DIASTOLIC BLOOD PRESSURE: 70 MMHG | HEART RATE: 97 BPM

## 2019-04-03 DIAGNOSIS — K90.9 MALABSORPTION OF IRON: ICD-10-CM

## 2019-04-03 DIAGNOSIS — C25.0 MALIGNANT NEOPLASM OF HEAD OF PANCREAS (HCC): ICD-10-CM

## 2019-04-03 DIAGNOSIS — D50.9 IRON DEFICIENCY ANEMIA, UNSPECIFIED IRON DEFICIENCY ANEMIA TYPE: ICD-10-CM

## 2019-04-03 DIAGNOSIS — R93.5 ABNORMAL CT OF THE ABDOMEN: ICD-10-CM

## 2019-04-03 DIAGNOSIS — G89.3 NEOPLASM RELATED PAIN: ICD-10-CM

## 2019-04-03 DIAGNOSIS — R97.8 ELEVATED CA 19-9 LEVEL: ICD-10-CM

## 2019-04-03 DIAGNOSIS — C25.0 MALIGNANT NEOPLASM OF HEAD OF PANCREAS (HCC): Primary | ICD-10-CM

## 2019-04-03 DIAGNOSIS — D50.8 IRON DEFICIENCY ANEMIA SECONDARY TO INADEQUATE DIETARY IRON INTAKE: Primary | ICD-10-CM

## 2019-04-03 LAB
ALBUMIN SERPL-MCNC: 3.19 G/DL (ref 3.5–5.2)
ALBUMIN/GLOB SERPL: 0.8 G/DL
ALP SERPL-CCNC: 100 U/L (ref 39–117)
ALT SERPL W P-5'-P-CCNC: 11 U/L (ref 1–41)
ANION GAP SERPL CALCULATED.3IONS-SCNC: 12.3 MMOL/L
AST SERPL-CCNC: 17 U/L (ref 1–40)
BASOPHILS # BLD AUTO: 0.03 10*3/MM3 (ref 0–0.2)
BASOPHILS NFR BLD AUTO: 0.5 % (ref 0–1.5)
BILIRUB SERPL-MCNC: 0.5 MG/DL (ref 0.2–1.2)
BUN BLD-MCNC: 21 MG/DL (ref 8–23)
BUN/CREAT SERPL: 15.3 (ref 7–25)
CALCIUM SPEC-SCNC: 9.4 MG/DL (ref 8.6–10.5)
CHLORIDE SERPL-SCNC: 105 MMOL/L (ref 98–107)
CO2 SERPL-SCNC: 21.7 MMOL/L (ref 22–29)
CREAT BLD-MCNC: 1.37 MG/DL (ref 0.76–1.27)
DEPRECATED RDW RBC AUTO: 60.4 FL (ref 37–54)
EOSINOPHIL # BLD AUTO: 0.06 10*3/MM3 (ref 0–0.4)
EOSINOPHIL NFR BLD AUTO: 1.1 % (ref 0.3–6.2)
ERYTHROCYTE [DISTWIDTH] IN BLOOD BY AUTOMATED COUNT: 20.4 % (ref 12.3–15.4)
GFR SERPL CREATININE-BSD FRML MDRD: 51 ML/MIN/1.73
GLOBULIN UR ELPH-MCNC: 4.2 GM/DL
GLUCOSE BLD-MCNC: 202 MG/DL (ref 65–99)
HCT VFR BLD AUTO: 29.5 % (ref 37.5–51)
HGB BLD-MCNC: 9.1 G/DL (ref 13–17.7)
IMM GRANULOCYTES # BLD AUTO: 0.01 10*3/MM3 (ref 0–0.05)
IMM GRANULOCYTES NFR BLD AUTO: 0.2 % (ref 0–0.5)
LYMPHOCYTES # BLD AUTO: 1.64 10*3/MM3 (ref 0.7–3.1)
LYMPHOCYTES NFR BLD AUTO: 29.4 % (ref 19.6–45.3)
MCH RBC QN AUTO: 26.1 PG (ref 26.6–33)
MCHC RBC AUTO-ENTMCNC: 30.8 G/DL (ref 31.5–35.7)
MCV RBC AUTO: 84.5 FL (ref 79–97)
MONOCYTES # BLD AUTO: 0.59 10*3/MM3 (ref 0.1–0.9)
MONOCYTES NFR BLD AUTO: 10.6 % (ref 5–12)
NEUTROPHILS # BLD AUTO: 3.25 10*3/MM3 (ref 1.4–7)
NEUTROPHILS NFR BLD AUTO: 58.2 % (ref 42.7–76)
PLATELET # BLD AUTO: 192 10*3/MM3 (ref 140–450)
PMV BLD AUTO: 9.8 FL (ref 6–12)
POTASSIUM BLD-SCNC: 4 MMOL/L (ref 3.5–5.2)
PROT SERPL-MCNC: 7.4 G/DL (ref 6–8.5)
RBC # BLD AUTO: 3.49 10*6/MM3 (ref 4.14–5.8)
SODIUM BLD-SCNC: 139 MMOL/L (ref 136–145)
WBC NRBC COR # BLD: 5.58 10*3/MM3 (ref 3.4–10.8)

## 2019-04-03 PROCEDURE — 99214 OFFICE O/P EST MOD 30 MIN: CPT | Performed by: INTERNAL MEDICINE

## 2019-04-03 PROCEDURE — 96374 THER/PROPH/DIAG INJ IV PUSH: CPT

## 2019-04-03 PROCEDURE — 80053 COMPREHEN METABOLIC PANEL: CPT | Performed by: NURSE PRACTITIONER

## 2019-04-03 PROCEDURE — 85025 COMPLETE CBC W/AUTO DIFF WBC: CPT | Performed by: NURSE PRACTITIONER

## 2019-04-03 PROCEDURE — 25010000002 FERUMOXYTOL 510 MG/17ML SOLUTION 510 MG VIAL: Performed by: INTERNAL MEDICINE

## 2019-04-03 RX ORDER — SODIUM CHLORIDE 9 MG/ML
250 INJECTION, SOLUTION INTRAVENOUS ONCE
Status: COMPLETED | OUTPATIENT
Start: 2019-04-03 | End: 2019-04-03

## 2019-04-03 RX ORDER — SODIUM CHLORIDE 0.9 % (FLUSH) 0.9 %
10 SYRINGE (ML) INJECTION AS NEEDED
Status: DISCONTINUED | OUTPATIENT
Start: 2019-04-03 | End: 2019-04-03 | Stop reason: HOSPADM

## 2019-04-03 RX ORDER — PROMETHAZINE HYDROCHLORIDE 25 MG/1
25 SUPPOSITORY RECTAL EVERY 6 HOURS PRN
Qty: 10 EACH | Refills: 0 | Status: ON HOLD | OUTPATIENT
Start: 2019-04-03 | End: 2019-08-06

## 2019-04-03 RX ORDER — PREDNISONE 10 MG/1
TABLET ORAL
Qty: 20 TABLET | Refills: 0 | Status: SHIPPED | OUTPATIENT
Start: 2019-04-03 | End: 2019-04-15 | Stop reason: SDUPTHER

## 2019-04-03 RX ORDER — DRONABINOL 2.5 MG/1
2.5 CAPSULE ORAL
Qty: 60 CAPSULE | Refills: 0 | Status: ON HOLD | OUTPATIENT
Start: 2019-04-03 | End: 2019-04-22

## 2019-04-03 RX ORDER — SODIUM CHLORIDE 0.9 % (FLUSH) 0.9 %
10 SYRINGE (ML) INJECTION AS NEEDED
Status: CANCELLED | OUTPATIENT
Start: 2019-04-08

## 2019-04-03 RX ADMIN — SODIUM CHLORIDE 250 ML: 9 INJECTION, SOLUTION INTRAVENOUS at 15:27

## 2019-04-03 RX ADMIN — FERUMOXYTOL 510 MG: 510 INJECTION INTRAVENOUS at 15:27

## 2019-04-03 RX ADMIN — HEPARIN SODIUM (PORCINE) LOCK FLUSH IV SOLN 100 UNIT/ML 500 UNITS: 100 SOLUTION at 16:13

## 2019-04-03 RX ADMIN — SODIUM CHLORIDE, PRESERVATIVE FREE 10 ML: 5 INJECTION INTRAVENOUS at 16:12

## 2019-04-03 NOTE — PROGRESS NOTES
NAME: Todd Phillips    : 1950    DATE : 19    DIAGNOSIS:   1.   Stage III moderately differentiated adenocarcinoma of the head of the pancreas with involvement of the duodenum ([id number]).    2.  Repeated episodes of bacteremia due to cholangitis -    3.  Refractory nausea/vomiting    CHIEF COMPLAINT:  Fatigue, weakness, nausea, vomiting, constipation, abdominal pain    TREATMENT:  1.       2.  On 18, Dr. Brody performed  Pancreaticoduodenectomy, wedge biopsy of the liver, portal vein resection and lateral repair and gastrojejunostomy tube placement    HISTORY OF PRESENT ILLNESS:   Todd Phillips is a very pleasant 68 y.o. male who is being seen today at the request of Dr. Miquel Brody for evaluation and treatment of pancreatic cancer.  Mr. Phillips initially developed symptoms in 2018. At that time he had pain in his upper abdomen which would radiate to his back.  In July, the pain intensified.  He was seen in the ER and also by Dr. Su.  He underwent RUQ U/S and then HIDA scan and it was determined he had a dysfunctional gallbladder.  He saw Dr. Downs and had cholecystectomy on 8-10-18.  Pathology showed chronic cholecystitis and an incidental benign lymph node.  He re-presented in the post-operative period with jaundice.  Dr. Su performed ERCP on18.  He was noted to have a 3 cm irregular tight stricture of the distal common bile duct with proximal biliary ductal dilatation.  Biliary papillotomy and stricture dilatation performed and brushings taken.  A 10 Fr x 5 cm biliary stent was placed.  Brushings were taken but were negative.  There was sl dilatation of the distal pancreatic duct without discrete stricture.   He was discharged with improving jaundice on .  On  he represented with sepsis due to Klebsiella pneumonia bacteremia and was admitted.  He then was referred to Dr. Brody for evaluation and treatment.  He has been set up for EUS with biopsy next  Friday for what appears to be a primary pancreatic malignancy in the head of the pancreas.  Dr. Brody has referred him for consideration of neoadjuvant therapy.    INTERVAL HISTORY:  Mr. Phillips is here today with his wife for follow up.  Since his last visit, he reports he has been about the same. He continues to struggle with nausea and vomiting. He says if tries to eat or drink, he will have abdominal pain. He has been taking zofran and phenergan prn. He says the phenergan seems to control his nausea better but this makes him very drowsy. He was advised to try reglan again but he has been afraid to do so as he believes this made him weaker.He says the IVF made him feel better last week. He reports ongoing fatigue and weakness but denies any worsening.  His wife says she called Dr. Su' office for follow up to see if there was anything else that could be done given his symptoms. He underwent gastric emptying study and was tested for SBBO as well. He was started on Xifaxan for the latter and this seems to have helped.  Dr. Su tells me the gastric emptying study did show bolus emptying of contrast after a delay with relief of symptoms with passing of the bolus suggestive of some obstruction.  Will get copy of scan results.      PAST MEDICAL HISTORY:  Past Medical History:   Diagnosis Date   • Antral gastritis    • Asthma    • Atrial fibrillation (CMS/HCC)     treated with oral blood thinner   • Chest pain    • COPD (chronic obstructive pulmonary disease) (CMS/HCC)    • Coronary artery disease     no stents   • Diabetes mellitus (CMS/HCC)     type 2 - checks sugar 4 times per day    • Duodenitis    • Elevated cholesterol    • Emphysema of lung (CMS/HCC)    • Gallbladder disease     removed    • GERD (gastroesophageal reflux disease)    • Hard to intubate     busted lip last time   • Hyperlipidemia    • Hypertension    • Jaundice    • Kidney stone    • Kidney stones     had lithotripsy and passed 36 kidney  stones as well as had one surgically removed.   • Malignant neoplasm of head of pancreas (CMS/HCC) 9/5/2018   • Nausea    • Obstructive sleep apnea on CPAP     compliant with machine    • Palpitations    • Pneumonia 08/2018   • Reflux esophagitis    • Renal failure     stage 3 kidney disease   • Sepsis (CMS/HCC)        PAST SURGICAL HISTORY:  Past Surgical History:   Procedure Laterality Date   • BILE DUCT STENT PLACEMENT      [facility] 2 weeks ago    • CARDIAC CATHETERIZATION  11/01/1999   • CARDIOVASCULAR STRESS TEST  09/2012   • CHOLECYSTECTOMY N/A 8/10/2018    Procedure: CHOLECYSTECTOMY LAPAROSCOPIC;  Surgeon: Ronak Downs MD;  Location: [facility] OR;  Service: General   • COLONOSCOPY     • CYSTOSCOPY BLADDER STONE LITHOTRIPSY     • ECHO - CONVERTED  09/2012   • ERCP N/A 8/14/2018    Procedure: ENDOSCOPIC RETROGRADE CHOLANGIOPANCREATOGRAPHY WITH PAPILLOTOMY;  Surgeon: Scot Su MD;  Location:  [facility] OR;  Service: Gastroenterology   • ERCP N/A 10/1/2018    Procedure: ENDOSCOPIC RETROGRADE CHOLANGIOPANCREATOGRAPHY;  Surgeon: Jefry Luna MD;  Location:  [facility];  Service: Gastroenterology   • KIDNEY STONE SURGERY     • KIDNEY STONE SURGERY      open abdominal surgery   • PORTACATH PLACEMENT Right 10/23/2018    Procedure: INSERTION OF PORTACATH;  Surgeon: Ronak Downs MD;  Location:  [facility] OR;  Service: General   • TONSILLECTOMY     • UPPER GASTROINTESTINAL ENDOSCOPY  08/30/2012   • WHIPPLE PROCEDURE N/A 12/31/2018    Procedure: WHIPPLE PROCEDURE, BIOPSY OF LIVER, PORTAL VEIN RESECTION AND REPAIR, G/J TUBE PLACEMENT;  Surgeon: Miquel Brody MD;  Location:  [facility] OR;  Service: General       FAMILY HISTORY:  Family History   Problem Relation Age of Onset   • Heart attack Mother    • Heart disease Mother    • Stroke Mother    • Kidney disease Mother    • Heart failure Mother    • Lung disease Father    • Tuberculosis Father    • Diabetes Sister    •  Heart attack Brother    • Heart failure Brother    • Heart disease Brother    • Diabetes Sister    • No Known Problems Brother    • No Known Problems Brother        SOCIAL HISTORY:  Social History     Socioeconomic History   • Marital status:      Spouse name: Not on file   • Number of children: Not on file   • Years of education: Not on file   • Highest education level: Not on file   Tobacco Use   • Smoking status: Never Smoker   • Smokeless tobacco: Never Used   Substance and Sexual Activity   • Alcohol use: No   • Drug use: No   • Sexual activity: Defer     Partners: Female   Social History Narrative    He is  and lives alone with his wife although they have 3 children together who all live close by. He is retired from the [employer] and was exposed to Agent Orange during [place]. He is a lifelong nonsmoker.         A DIL  of cancer.    REVIEW OF SYSTEMS:   A comprehensive 14 point review of systems was performed.  Significant findings as mentioned above.  All other systems reviewed and are negative.      MEDICATIONS:  The current medication list was reviewed in the EMR    Current Outpatient Medications:   •  albuterol (PROVENTIL HFA;VENTOLIN HFA) 108 (90 BASE) MCG/ACT inhaler, Inhale 2 puffs Every 4 (Four) Hours As Needed for Wheezing or Shortness of Air., Disp: , Rfl:   •  albuterol (PROVENTIL) (2.5 MG/3ML) 0.083% nebulizer solution, Take 2.5 mg by nebulization 2 (Two) Times a Day As Needed for Wheezing or Shortness of Air., Disp: , Rfl:   •  allopurinol (ZYLOPRIM) 100 MG tablet, Take 1 tablet by mouth Daily., Disp: 90 tablet, Rfl: 0  •  apixaban (ELIQUIS) 5 MG tablet tablet, Take 1 tablet by mouth Every 12 (Twelve) Hours., Disp: 60 tablet, Rfl:   •  atenolol (TENORMIN) 25 MG tablet, Take 25 mg by mouth Daily As Needed., Disp: , Rfl:   •  cholecalciferol (VITAMIN D3) 1000 units tablet, Take 1,000 Units by mouth Daily., Disp: , Rfl:   •  colchicine 0.6 MG tablet, Take 1 tablet by mouth Daily.  (Patient taking differently: Take 0.6 mg by mouth As Needed.), Disp: 90 tablet, Rfl: 2  •  FLUoxetine (PROzac) 20 MG capsule, Take 20 mg by mouth 2 (Two) Times a Day., Disp: , Rfl:   •  insulin lispro (humaLOG) 100 UNIT/ML injection, Inject  under the skin into the appropriate area as directed 3 (Three) Times a Day As Needed (before meals prn)., Disp: , Rfl:   •  lactobacillus acidophilus (RISAQUAD) capsule capsule, Take 1 capsule by mouth Daily., Disp: 30 capsule, Rfl: 0  •  mometasone (ASMANEX) 220 MCG/INH inhaler, Inhale 2 puffs Every Night., Disp: , Rfl:   •  montelukast (SINGULAIR) 10 MG tablet, Take 10 mg by mouth Every Night., Disp: , Rfl:   •  nitroglycerin (NITROSTAT) 0.4 MG SL tablet, Place 0.4 mg under the tongue as needed for chest pain., Disp: , Rfl:   •  omeprazole (priLOSEC) 40 MG capsule, Take 40 mg by mouth Daily., Disp: , Rfl:   •  ondansetron (ZOFRAN) 8 MG tablet, Take 1 tablet by mouth Every 8 (Eight) Hours As Needed for Nausea or Vomiting., Disp: 60 tablet, Rfl: 3  •  oxyCODONE (ROXICODONE) 5 MG immediate release tablet, Take 1-2 tabs every 4-6 hours as needed for pain, Disp: 100 tablet, Rfl: 0  •  pancrelipase, Lip-Prot-Amyl, (CREON) 11994 units capsule delayed-release particles capsule, Take 12,000 units of lipase by mouth 3 (Three) Times a Day With Meals., Disp: , Rfl:   •  polyethylene glycol (MIRALAX) packet, Take 17 g by mouth Daily As Needed., Disp: , Rfl:   •  promethazine (PHENERGAN) 12.5 MG tablet, Take 1 tablet by mouth Every 6 (Six) Hours As Needed for Nausea or Vomiting., Disp: 90 tablet, Rfl: 3  •  rifaximin (XIFAXAN) 550 MG tablet, Take 550 mg by mouth 2 (Two) Times a Day., Disp: , Rfl:   •  Tiotropium Bromide-Olodaterol 2.5-2.5 MCG/ACT aerosol solution, Inhale 2 puffs Daily., Disp: , Rfl:   No current facility-administered medications for this visit.     Facility-Administered Medications Ordered in Other Visits:   •  ferumoxytol (FERAHEME) 510 mg in sodium chloride 0.9 % 117 mL  IVPB, 510 mg, Intravenous, Once, Gwen Alves MD, Last Rate: 468 mL/hr at 04/03/19 1527, 510 mg at 04/03/19 1527  •  sodium chloride 0.9 % infusion  - ADS Override Pull, , , ,   •  sodium chloride 0.9 % infusion  - ADS Override Pull, , , ,     ALLERGIES:    Allergies   Allergen Reactions   • Ativan [Lorazepam] Mental Status Change   • Chlorhexidine Hives and Rash   • Contrast Dye Other (See Comments)     Can't have due to kidney failure per family   • Fentanyl Confusion and Unknown (See Comments)     Patient family reports extreme symptoms with fentanyl. Including confusion, restlessness, irritability an heavy sedation.     PHYSICAL EXAM:  Vitals:    04/03/19 1415   BP: 104/70   Pulse: 97   Resp: 18   Temp: 98 °F (36.7 °C)   SpO2: 98%   General:  Alert and oriented.  Appears fatigued but otherwise well.  HEENT:  Pupils are equal, round and reactive to light and accommodation, Extra-ocular movements full, Oropharyx clear, MMM  Neck:  No JVD, thyromegaly or lymphadenopathy  CV:  Regular rate/rhythm, no murmurs, rubs or gallops  Resp:  Lungs are clear to auscultation bilaterally  Abd:  Soft, diffuse non-specific tenderness with palpation, non-distended, bowel sounds intact, no organomegaly or masses.  Surgical incisions well-healed.  Ext:  No clubbing, cyanosis or edema  Lymph:  No cervical, supraclavicular, axillary,adenopathy  Neuro: grossly non-focal exam    PATHOLOGY:  08-15-18         12-31-18 Whipple  Final Diagnosis   1. LIVER, WEDGE BIOPSY:  Small bland ductular proliferation compatible with bile duct adenoma.   Negative for metastatic carcinoma.   2. OMENTUM:  Mild chronic inflammation and surface adhesions; negative for neoplasm.   3. HEPATIC ARTERY LYMPH NODE:  Sinus histiocytosis; single lymph node negative for metastatic carcinoma. (0/1)  4. SUBMITTED BILE DUCT MARGIN:  Margin with chronic inflammation; negative for adenocarcinoma.   5. AORTA-CAVAL LYMPH NODES:  Two lymph nodes negative for  metastatic carcinoma. (0/2)  6. PANCREATODUODENECTOMY (WHIPPLE PROCEDURE);  Invasive moderately differentiated adenocarcinoma arising in head of pancreas and involving duodenum.  Gross tumor size 3.7 cm in greatest dimension.  Perineural and peripancreatic extension identified.   Soft tissue at superior mesenteric artery positive for neoplasm.  Posterior/uncinate margin involved by neoplasm.  Five of twelve peripancreatic lymph nodes positive for metastatic neoplasm. See Template.        PANCREAS EXOCRINE:  TYPE OF SPECIMEN/PROCEDURE: Whipple procedure  SPECIMEN INTEGRITY: Intact  TUMOR SITE (HEAD, BODY, TAIL): Head  TUMOR SIZE (GREATEST DIMENSION): 3.7 cm greatest dimension  HISTOLOGIC TYPE: Adenocarcinoma  HISTOLOGIC stGstRstAstDstEst:st st1st TUMOR EXTENSION: Neoplasm involves duodenum and peripancreatic fat  SURGICAL MARGINS, IF INVOLVED, (SPECIFY WHERE): Involved  MARGINS EXAMINED: See below  PARENCHYMAL MARGIN: Not involved  UNCINATE: Involved  BILE DUCT: Not involved  DISTAL MARGIN: Not involved   PROXIMAL MARGIN: Not involved   OTHER MARGINS: SMA margin involved   CLOSEST MARGIN: Margins involved    SPECIFIC MARGIN: SMA and posterior/uncinate  INVADES CELIAC AXIS OR SMA: No  VASCULAR/LYMPHATIC INVASION: Present  PERINEURAL INVASION:  Present  REGIONAL LYMPH NODES: Submitted  NUMBER OF LYMPH NODES INVOLVED: 5  NUMBER OF LYMPH NODES EXAMINED: 15  EXTRACAPSULAR EXTENSION: Focally present  TREATMENT EFFECT: No known presurgical therapy  ADDITIONAL PATHOLOGIC FINDINGS: Chronic pancreatitis   OTHER STUDIES: Available upon request  AJCC PATHOLOGIC STAGE: (COMPLETED BY PATHOLOGIST BASED ONLY ON TISSUE FINDINGS, MORE EXTENSIVE DISEASE MAY NOT BE KNOW TO THE PATHOLOGIST)  pT=  2   pN= 2  AJCC PATHOLOGIC STAGE: III     1-11-19 Abdominal Fluid:  Final Diagnosis    ABDOMINAL FLUID:  Negative for malignancy.  Acute inflammation and necrotic debris.  No tumor seen.         ENDOSCOPY:  08-14-18 ERCP with papillotomy, balloon rotation of the  biliary stricture, pathology brushings, ileum stent placement (Georges)  1.  Irregular 3 cm distal common bile duct stricture concerning for neoplastic disease. Biliary papillotomy, stricture dilatation by  through the scope balloon, cytology brushings performed and 10 Nepali by 5 cm biliary stent placed.    2.  Dilated common hepatic duct to 15 mm when fully contrast filled.  Dilated intrahepatic biliary tree.    3.  No stones proximal to the stricture were identified.    4.  Slight dilatation of the distal pancreatic duct without a discrete stricture identified.    IMAGIN18 CT Abdomen Pelvis Stone Protocol   Findings  - LOWER THORAX: Clear. No effusions.  - LIVER: Homogeneous. No focal hepatic mass or ductal dilatation.  - GALLBLADDER: MINIMAL STRANDING AROUND REGION OF GALLBLADDER FOSSA  THAT MAY BE POSTSURGICAL.  - PANCREAS: Unremarkable. No mass or ductal dilatation.  - SPLEEN: Homogeneous. No splenomegaly.  - ADRENALS: No mass.  - KIDNEYS: No mass. No obstructive uropathy.  No evidence of urolithiasis.  - GI TRACT: SMALL HIATAL HERNIA.  - PERITONEUM: No free air. No free fluid or loculated fluid collections.  - MESENTERY: Unremarkable.  - LYMPH NODES: No lymphadenopathy.  - VASCULATURE: ATHEROSCLEROTIC VASCULAR CALCIFICATION.  - ABDOMINAL WALL: No focal hernia or mass.  - OTHER: None.  - BLADDER: No focal mass or significant wall thickening  - REPRODUCTIVE: Unremarkable as visualized.  - APPENDIX: Nondistended. No surrounding inflammation.  - BONES: No acute bony abnormality.     IMPRESSION:  - Minimal stranding around region of gallbladder fossa that may be postsurgical.     18 US Liver   FINDINGS:   - Visualized pancreas is unremarkable.   - The gallbladder is absent. No collection identified.   - The CBD measures 10.98500330645 mm    .  - The liver demonstrates normal echogenicity without focal lesion.    - No ascites demonstrated.        IMPRESSION:  - As above.    18 CT Abdomen Pelvis  Stone Protocol   FINDINGS:   - Minimal bibasilar atelectasis.  - Hiatal hernia.  - The liver is homogeneous. There is no evidence of focal hepatic mass.  - The spleen is homogeneous.  - Stent is noted traversing the common bile duct.  - 3 mm nodule in the right lung on image 8 of the axial series.  - There is no adrenal enlargement.  - The kidneys show no evidence of hydronephrosis or hydroureter. I do not see any distal ureteral stones.   - Otherwise I do not see any free fluid or walled off fluid collections.  - There is moderate to large volume stool in the colon.  - There is no evidence of mesenteric or retroperitoneal adenopathy.     IMPRESSION:  1. Tiny nodule in the right lung base.  2. Minimal bibasilar atelectasis.  3. Moderate to large volume stool in the colon.     Other findings as above.    08-22-18 CT Abdomen Pelvis With Contrast   FINDINGS:   - Tiny left basilar nodule measuring 4 mm on image 14 of the axial series.  - Minimal bibasilar atelectasis.  - The liver is homogeneous. There is no evidence of focal hepatic mass  - The spleen is homogeneous  - Indistinct appearance of the pancreatic head. A biliary stent is present.  - Small left adrenal nodule is present.  - The kidneys show no evidence of hydronephrosis or hydroureter. I do not see any distal ureteral stones.   - Otherwise I do not see any free fluid or walled off fluid collections.  - Large volume stool is present in the colon.     IMPRESSION:  1. Slightly enlarged, indistinct appearance of the pancreatic head.  - Underlying neoplasm certainly not excluded but no delineable mass is present. There is a biliary stent.    2. Tiny nodule in the left lung base.    3. Small left adrenal nodule.    4. Hiatal hernia.    5. Moderate to large volume stool in the colon.      09-11-18 CT Chest Without Contrast   FINDINGS:   - Minimal coronary artery calcifications present.  - No pericardial or pleural effusions.  - No parenchymal soft tissue nodules or  masses. There are findings of COPD.     IMPRESSION:  - COPD.  - Hiatal hernia.  - Minimal coronary artery calcification.       09-11-18 CT Abdomen Pelvis Without Contrast   FINDINGS:   - Lung bases show mild increased interstitial markings.  - There is a hiatal hernia.  - The liver is homogeneous. There is no evidence of focal hepatic mass.  - The spleen is homogeneous.  - Mildly indistinct appearance of the pancreas. There is a biliary stent present. I cannot exclude mild degree of pancreatitis..  - There is no adrenal enlargement.  - The kidneys show no evidence of hydronephrosis or hydroureter. I do not see any distal ureteral stones.   - Otherwise I do not see any free fluid or walled off fluid collections.  - Large volume stool is present in the colon.  - Urinary bladder wall is slightly thickened.  - There is no evidence of mesenteric or retroperitoneal adenopathy.    IMPRESSION:  1. Mildly indistinct appearance of the proximal pancreas.  2. Urinary bladder wall thickening.  3. Large volume stool in the colon.    09-14-18 CT Abdomen Without Contrast   FINDINGS:   - Again noted is very mild indistinct appearance of the pancreatic head. Stent is stable in position.  - The liver is stable and homogeneous.  - Spleen is homogeneous.  - No adrenal enlargement.  - Moderate to large volume stool in the colon.     IMPRESSION:  - No significant interval change since the previous exam.     09-21-18 NM Pet Skull Base To Mid Thigh   FINDINGS:      HEAD/NECK:  - No FDG hypermetabolic neck adenopathy.  - No FDG hypermetabolic masses.     CHEST:   - No FDG hypermetabolic thoracic adenopathy.  - No FDG hypermetabolic lung nodules or masses.  - Evaluation for tiny parenchymal nodules is somewhat limited on low dose CT secondary to respiratory motion.     ABDOMEN/PELVIS:   - There is hypermetabolic activity in the pancreatic head with a maximum SUV of 4.971.  - Physiologic FDG hypermetabolism seen throughout the GI tract.  -  Physiologic FDG hypermetabolism seen throughout the mesentery, retroperitoneum and pelvis.     BONES:   - There are scattered foci of FDG hypermetabolism most suggestive of degenerative change seen throughout the axial skeleton. If concern persist for metastatic lesions of the bone, HDP Bone scan is recommended for further evaluation.     IMPRESSION:  - Abnormal hypermetabolic activity corresponding to area of indistinctness in the pancreatic head. Maximum SUV is 4.97.    1-20-19 CT CAP with contrast:  Today's study shows mild coronary artery calcifications.     There are bilateral pleural effusions, right greater than left.     There is bibasilar consolidation. I cannot exclude pneumonia.     No parenchymal masses are seen.     IMPRESSION:  Bibasilar effusions and bibasilar consolidation.     There are bibasilar pleural effusions and there is bibasilar  consolidation.     Patient has a percutaneous gastric tube.     There is pneumoperitoneum. This may be from the percutaneous gastric  tube. The tube appears to remain intraluminal but left lateral aspect  could extend beyond the confines of the bowel.     There is a perihepatic fluid collection which contains air and is  concerning for perihepatic abscess.     The liver is homogeneous. There is no evidence of focal hepatic mass     The spleen is homogeneous     There is no peripancreatic stranding or pancreatic head mass.     There is no adrenal enlargement.     The kidneys show no evidence of hydronephrosis or hydroureter. I do not  see any distal ureteral stones.      Small volume ascites is present.     There is no bowel wall thickening or mesenteric stranding.             IMPRESSION:  :   1. Bibasilar effusions and bibasilar consolidation.  2. Loculated perihepatic fluid collection containing air. This is  concerning for an abscess.  3. Percutaneous gastric tube is present. The tip appears to remain  intraluminal. There is, however, single focus of  pneumoperitoneum which  may be associated with gastric tube placement.  4. Small volume ascites.    CT A/P without Contrast 1-24-19:  The percutaneous drain placed on 01/11/2019 and the  perihepatic abscess has been removed. There is persistent loculated  perihepatic fluid although this has markedly improved since prior to  drain placement; the collection did measure 8 x 22 x 9 cm before  drainage as one large collection and now is split into two possibly  separate smaller collections, the more posterior of the two measuring 2  x 10 x 2 cm and the more superior 4 x 6 x 3 cm (the collections may be  narrowly connected).     Percutaneous gastrojejunostomy is in place; status post Whipple. There  is a small amount of low density abdominopelvic free fluid. There is gas  in the nondependent portion of the urinary bladder, probably from recent  instrumentation. There is no evidence of bowel obstruction. Only a tiny  locule of free air persists in the mid abdomen deep to the midline  incision, improved. The kidneys, adrenal glands, pancreas and spleen are  unchanged.     There is mild body wall edema and subcutaneous gas from recent  medication injections. Small volume right and trace volume left pleural  effusions. There is near complete collapse of the right lower lobe but  there is only subsegmental atelectasis at the left lung base. No  pertinent osseous abnormality is seen.     IMPRESSION:  1. Marked improvement in the perihepatic subcapsular abscess. Drainage  catheter has been removed.  2. Unchanged low density free fluid. Unchanged small volume right and  trace left pleural effusions.    CTCAP 02-26-19:  Findings  LUNGS: BIBASILAR LUNG FIBROTIC CHANGE.  HEART: Unremarkable.  PERICARDIUM: No effusion.  MEDIASTINUM: No masses. No enlarged lymph nodes.  No fluid collections.  PLEURA: TINY LEFT PLEURAL EFFUSION.  MAJOR AIRWAYS: Clear. No intrinsic mass.  VASCULATURE: No evidence of aneurysm.     VISUALIZED UPPER  ABDOMEN:  LIVER: Homogeneous. No focal hepatic mass or ductal dilatation.  SPLEEN: Homogeneous. No splenomegaly.  ADRENALS: No mass.  KIDNEYS: No mass. No obstructive uropathy.  No evidence of urolithiasis.  GI TRACT: Non-dilated. No definite wall thickening  PERITONEUM: No free air. No free fluid or loculated fluid collections.  ABDOMINAL WALL: No focal hernia or mass.       OTHER: None.     BONES: No acute bony abnormality.     IMPRESSION:  Now only tiny pleural effusions.    Findings  LOWER THORAX: Clear. No effusions.     ABDOMEN:  LIVER: SUBCAPSULE LIVER COLLECTION MEASURING ABOUT 4.8 CM THAT WAS ABOUT 7.2 CM.  GALLBLADDER: No dilation or stone identified.  PANCREAS: Unremarkable. No mass or ductal dilatation.  SPLEEN: Homogeneous. No splenomegaly.  ADRENALS: No mass.  KIDNEYS: No mass. No obstructive uropathy.  No evidence of urolithiasis.  GI TRACT: Non-dilated. No definite wall thickening.  PERITONEUM: TINY ASCITES.  MESENTERY: Unremarkable.  LYMPH NODES: No lymphadenopathy.  VASCULATURE: No evidence of aneurysm.  ABDOMINAL WALL: No focal hernia or mass.     OTHER: None.     PELVIS:  BLADDER: No focal mass or significant wall thickening  REPRODUCTIVE: Unremarkable as visualized.  APPENDIX: Nondistended. No surrounding inflammation.  BONES: No acute bony abnormality.     IMPRESSION:  Still tiny ascites. Decreased size of a subhepatic collection.      PET/CT 03-29-19:  FINDINGS:      HEAD/NECK:  No FDG hypermetabolic neck adenopathy.  No FDG hypermetabolic masses.     CHEST:   No FDG hypermetabolic thoracic adenopathy.  No FDG hypermetabolic lung nodules or masses.  Evaluation for tiny parenchymal nodules is somewhat limited on low dose  CT secondary to respiratory motion.     ABDOMEN/PELVIS:   Physiologic FDG hypermetabolism seen throughout the solid abdominal  organs.  There is hypermetabolic activity along the anterior margin of the liver.  This appears to correspond to trace subcapsular fluid. The maximum  SUV  was 5.385. This is abnormal.  There is a tiny nodular density anterior to the right of midline in the  peritoneum on image 234 of the axial series. This shows maximum SUV of  3.057. Also hypermetabolic activity to the left of midline adjacent to  surgical clips in the anterior abdomen. This shows a maximum SUV of  4.206.     Physiologic FDG hypermetabolism seen throughout the mesentery,  retroperitoneum and pelvis.     BONES: There are scattered foci of FDG hypermetabolism most suggestive  of degenerative change seen throughout the axial skeleton. If concern  persist for metastatic lesions of the bone, HDP Bone scan is recommended  for further evaluation.     IMPRESSION:     1. Hypermetabolic activity along the anterior margin of the liver which  appears to correspond with a small amount of subcapsular fluid but  concerning for active disease.  2. Tiny nodular density or possibly trace fluid in the peritoneum to the  right of midline as described above.  3. Hypermetabolic activity possibly bowel activity in the lower abdomen  to the left of midline adjacent to the surgical clip on image 247 of the  axial series. All of these areas could represent residual or metastatic  disease.  RECENT LABS:  Lab Results   Component Value Date    WBC 5.58 04/03/2019    HGB 9.1 (L) 04/03/2019    HCT 29.5 (L) 04/03/2019    MCV 84.5 04/03/2019    RDW 20.4 (H) 04/03/2019     04/03/2019    NEUTRORELPCT 58.2 04/03/2019    LYMPHORELPCT 29.4 04/03/2019    MONORELPCT 10.6 04/03/2019    EOSRELPCT 1.1 04/03/2019    BASORELPCT 0.5 04/03/2019    NEUTROABS 3.25 04/03/2019    LYMPHSABS 1.64 04/03/2019       Lab Results   Component Value Date     04/03/2019    K 4.0 04/03/2019    CO2 21.7 (L) 04/03/2019     04/03/2019    BUN 21 04/03/2019    CREATININE 1.37 (H) 04/03/2019    EGFRIFNONA 51 (L) 04/03/2019    EGFRIFAFRI  09/02/2016      Comment:      <15 Indicative of kidney failure.    GLUCOSE 202 (H) 04/03/2019    CALCIUM  9.4 04/03/2019    ALKPHOS 100 04/03/2019    AST 17 04/03/2019    ALT 11 04/03/2019    BILITOT 0.5 04/03/2019    ALBUMIN 3.19 (L) 04/03/2019    PROTEINTOT 7.4 04/03/2019    MG 1.8 02/01/2019    PHOS 2.8 01/26/2019     Lab Results   Component Value Date    FERRITIN 435.00 (H) 02/20/2019    IRON 11 (L) 02/20/2019    TIBC 196 (L) 02/20/2019    LABIRON 6 (L) 02/20/2019    NQIHEPJH34 1,361 (H) 02/20/2019    FOLATE 19.70 02/20/2019       Lab Results   Component Value Date     129.6 (H) 03/20/2019     85 (H) 02/20/2019     34 01/22/2019     104 (H) 01/07/2019     1349 (H) 12/19/2018     1089 (H) 12/10/2018     1576 (H) 11/13/2018     5149 (H) 10/19/2018     7602 (H) 09/25/2018     3636 (H) 09/07/2018     4032 (H) 08/15/2018     ASSESSMENT & PLAN:  Todd Phillips is a very pleasant 68 y.o. male with Stage III moderately differentiated adenocarcinoma of the head of the pancreas with involvement of the duodenum ([id number]), now with evidence of anastomotic recurrence as well as involvement of the liver and rising .    1.  Pancreatic cancer:  -  Initially attempted neoadjuvant chemotherapy with Gemcitabine and Abraxane and he completed one cycle.  He became very weak and had to have TPN support.   -  He underwent surgical resection 12-31-18 and had Stage III disease ([id number]) moderately differentiated adenocarcinoma of the pancreas.  Tumor was 3.7 cm in maximal dimension and 5/15 associated LNs were involved.  Uncinate and SMA margins were involved.    -  There has been question about whether he should take further therapy (chemotherapy and / or radiation), but he had a very slow recovery and wasn't able to take this promptly after surgery.  -   is now rising, he has developed obstructive symptoms and he has PET posittivity at the small bowel anastomosis along with abnormal emptying study.  He also has an area of PET uptake along the anterior liver.  -  All of  this is concerning for recurrent disease.  -  I have recommended repeat trial of chemotherapy with Gemcitabine / Abraxane.  We discussed chemoradiation, but I am concerned he may not tolerate that well.  We also discussed palliative care /symptom management.  He wants to think it over but is leaning toward trial of chemotherapy.    2.  SBBO:  Dr. Su is treating with Xifaxan.      3.  N/V/Dehydration:  -  Doing a little better in this regard.  Recommended he take Reglan which is likely to be the most helpful in terms of helping with gastric emptying.  His appetite is strong, but could take Marinol to help with nausea.     4.  Neoplasm related pain:  -  Continue Oxycodone as needed (typically HS).      4.  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  He previously took oral iron but did not absorb it.  Ordered Feraheme which he is receiving today.    5.  Constipation:.Continue Senna/ Colace ii BID with Miralax as needed.       6.   History of Atrial fibrillation:  Chronically anticoagulated on Eliquis.      7.  Prophylaxis:  Has had 2018 influenza vaccine.      8.  ACO Quality measures  - Todd Phillips does not use tobacco products.  - Current outpatient and discharge medications have been reconciled for the patient.  Reviewed by:Gwen Alves MD    This note was scribed for Gwen Alves MD by Tamara Morejon RN.    I, Gwen Alves MD, personally performed the services described in this documentation as scribed by the above named individual in my presence, and it is both accurate and complete.  04/03/2019        I spent 30 minutes with Todd Phillips today.  More than 50% of the time was spent in counseling / coordination of care for the above problems.    Electronically Signed by: Gwen Alves MD      CC:   MD Miquel Quintana MD Aaron House, MD Michael Simons, MD

## 2019-04-04 ENCOUNTER — APPOINTMENT (OUTPATIENT)
Dept: ONCOLOGY | Facility: HOSPITAL | Age: 69
End: 2019-04-04
Attending: INTERNAL MEDICINE

## 2019-04-08 ENCOUNTER — DOCUMENTATION (OUTPATIENT)
Dept: ONCOLOGY | Facility: HOSPITAL | Age: 69
End: 2019-04-08

## 2019-04-08 ENCOUNTER — INFUSION (OUTPATIENT)
Dept: ONCOLOGY | Facility: HOSPITAL | Age: 69
End: 2019-04-08
Attending: INTERNAL MEDICINE

## 2019-04-08 VITALS
TEMPERATURE: 97.5 F | BODY MASS INDEX: 23.87 KG/M2 | HEART RATE: 93 BPM | OXYGEN SATURATION: 99 % | DIASTOLIC BLOOD PRESSURE: 64 MMHG | WEIGHT: 176 LBS | SYSTOLIC BLOOD PRESSURE: 93 MMHG | RESPIRATION RATE: 18 BRPM

## 2019-04-08 DIAGNOSIS — R11.0 NAUSEA: Primary | ICD-10-CM

## 2019-04-08 DIAGNOSIS — E86.0 DEHYDRATION: ICD-10-CM

## 2019-04-08 DIAGNOSIS — C25.0 MALIGNANT NEOPLASM OF HEAD OF PANCREAS (HCC): ICD-10-CM

## 2019-04-08 LAB
ALBUMIN SERPL-MCNC: 3.15 G/DL (ref 3.5–5.2)
ALBUMIN/GLOB SERPL: 0.9 G/DL
ALP SERPL-CCNC: 85 U/L (ref 39–117)
ALT SERPL W P-5'-P-CCNC: 18 U/L (ref 1–41)
ANION GAP SERPL CALCULATED.3IONS-SCNC: 12.3 MMOL/L
AST SERPL-CCNC: 31 U/L (ref 1–40)
BASOPHILS # BLD AUTO: 0.02 10*3/MM3 (ref 0–0.2)
BASOPHILS NFR BLD AUTO: 0.4 % (ref 0–1.5)
BILIRUB SERPL-MCNC: 0.4 MG/DL (ref 0.2–1.2)
BUN BLD-MCNC: 29 MG/DL (ref 8–23)
BUN/CREAT SERPL: 22.7 (ref 7–25)
CALCIUM SPEC-SCNC: 9 MG/DL (ref 8.6–10.5)
CHLORIDE SERPL-SCNC: 110 MMOL/L (ref 98–107)
CO2 SERPL-SCNC: 20.7 MMOL/L (ref 22–29)
CREAT BLD-MCNC: 1.28 MG/DL (ref 0.76–1.27)
DEPRECATED RDW RBC AUTO: 61.9 FL (ref 37–54)
EOSINOPHIL # BLD AUTO: 0.04 10*3/MM3 (ref 0–0.4)
EOSINOPHIL NFR BLD AUTO: 0.8 % (ref 0.3–6.2)
ERYTHROCYTE [DISTWIDTH] IN BLOOD BY AUTOMATED COUNT: 21.2 % (ref 12.3–15.4)
GFR SERPL CREATININE-BSD FRML MDRD: 56 ML/MIN/1.73
GLOBULIN UR ELPH-MCNC: 3.4 GM/DL
GLUCOSE BLD-MCNC: 218 MG/DL (ref 65–99)
HCT VFR BLD AUTO: 26.8 % (ref 37.5–51)
HGB BLD-MCNC: 8.2 G/DL (ref 13–17.7)
IMM GRANULOCYTES # BLD AUTO: 0.03 10*3/MM3 (ref 0–0.05)
IMM GRANULOCYTES NFR BLD AUTO: 0.6 % (ref 0–0.5)
LYMPHOCYTES # BLD AUTO: 1 10*3/MM3 (ref 0.7–3.1)
LYMPHOCYTES NFR BLD AUTO: 19.9 % (ref 19.6–45.3)
MCH RBC QN AUTO: 26.3 PG (ref 26.6–33)
MCHC RBC AUTO-ENTMCNC: 30.6 G/DL (ref 31.5–35.7)
MCV RBC AUTO: 85.9 FL (ref 79–97)
MONOCYTES # BLD AUTO: 0.24 10*3/MM3 (ref 0.1–0.9)
MONOCYTES NFR BLD AUTO: 4.8 % (ref 5–12)
NEUTROPHILS # BLD AUTO: 3.7 10*3/MM3 (ref 1.4–7)
NEUTROPHILS NFR BLD AUTO: 73.5 % (ref 42.7–76)
PLATELET # BLD AUTO: 229 10*3/MM3 (ref 140–450)
PMV BLD AUTO: 9.3 FL (ref 6–12)
POTASSIUM BLD-SCNC: 3.8 MMOL/L (ref 3.5–5.2)
PROT SERPL-MCNC: 6.5 G/DL (ref 6–8.5)
RBC # BLD AUTO: 3.12 10*6/MM3 (ref 4.14–5.8)
SODIUM BLD-SCNC: 143 MMOL/L (ref 136–145)
WBC NRBC COR # BLD: 5.03 10*3/MM3 (ref 3.4–10.8)

## 2019-04-08 PROCEDURE — 25010000002 FOSAPREPITANT PER 1 MG: Performed by: INTERNAL MEDICINE

## 2019-04-08 PROCEDURE — 96375 TX/PRO/DX INJ NEW DRUG ADDON: CPT

## 2019-04-08 PROCEDURE — 80053 COMPREHEN METABOLIC PANEL: CPT

## 2019-04-08 PROCEDURE — 25010000002 DEXAMETHASONE SODIUM PHOSPHATE 20 MG/5ML SOLUTION 5 ML VIAL: Performed by: INTERNAL MEDICINE

## 2019-04-08 PROCEDURE — 25010000002 GEMCITABINE HCL 2 GM/20ML SOLUTION 20 ML VIAL: Performed by: INTERNAL MEDICINE

## 2019-04-08 PROCEDURE — 25010000002 ONDANSETRON PER 1 MG: Performed by: INTERNAL MEDICINE

## 2019-04-08 PROCEDURE — 96417 CHEMO IV INFUS EACH ADDL SEQ: CPT

## 2019-04-08 PROCEDURE — 85025 COMPLETE CBC W/AUTO DIFF WBC: CPT

## 2019-04-08 PROCEDURE — 96413 CHEMO IV INFUSION 1 HR: CPT

## 2019-04-08 PROCEDURE — 96367 TX/PROPH/DG ADDL SEQ IV INF: CPT

## 2019-04-08 PROCEDURE — 25010000002 PACLITAXEL PROTEIN-BOUND PART PER 1 MG: Performed by: INTERNAL MEDICINE

## 2019-04-08 RX ORDER — SODIUM CHLORIDE 0.9 % (FLUSH) 0.9 %
10 SYRINGE (ML) INJECTION AS NEEDED
Status: DISCONTINUED | OUTPATIENT
Start: 2019-04-08 | End: 2019-04-08 | Stop reason: HOSPADM

## 2019-04-08 RX ORDER — SODIUM CHLORIDE 0.9 % (FLUSH) 0.9 %
10 SYRINGE (ML) INJECTION AS NEEDED
Status: CANCELLED | OUTPATIENT
Start: 2019-04-15

## 2019-04-08 RX ORDER — PROCHLORPERAZINE MALEATE 10 MG
10 TABLET ORAL EVERY 6 HOURS PRN
Qty: 60 TABLET | Refills: 3 | Status: SHIPPED | OUTPATIENT
Start: 2019-04-08

## 2019-04-08 RX ORDER — DEXAMETHASONE 4 MG/1
TABLET ORAL
Qty: 18 TABLET | Refills: 5 | Status: SHIPPED | OUTPATIENT
Start: 2019-04-08 | End: 2019-04-25 | Stop reason: HOSPADM

## 2019-04-08 RX ORDER — SODIUM CHLORIDE 9 MG/ML
1000 INJECTION, SOLUTION INTRAVENOUS ONCE
Status: COMPLETED | OUTPATIENT
Start: 2019-04-08 | End: 2019-04-08

## 2019-04-08 RX ORDER — PACLITAXEL 100 MG/20ML
250 INJECTION, POWDER, LYOPHILIZED, FOR SUSPENSION INTRAVENOUS ONCE
Status: COMPLETED | OUTPATIENT
Start: 2019-04-08 | End: 2019-04-08

## 2019-04-08 RX ADMIN — DEXAMETHASONE SODIUM PHOSPHATE 12 MG: 4 INJECTION, SOLUTION INTRAMUSCULAR; INTRAVENOUS at 10:12

## 2019-04-08 RX ADMIN — SODIUM CHLORIDE 8 MG: 9 INJECTION, SOLUTION INTRAVENOUS at 11:18

## 2019-04-08 RX ADMIN — PACLITAXEL 250 MG: 100 INJECTION, POWDER, LYOPHILIZED, FOR SUSPENSION INTRAVENOUS at 12:01

## 2019-04-08 RX ADMIN — SODIUM CHLORIDE 150 MG: 9 INJECTION, SOLUTION INTRAVENOUS at 10:29

## 2019-04-08 RX ADMIN — HEPARIN SODIUM (PORCINE) LOCK FLUSH IV SOLN 100 UNIT/ML 500 UNITS: 100 SOLUTION at 13:38

## 2019-04-08 RX ADMIN — SODIUM CHLORIDE, PRESERVATIVE FREE 10 ML: 5 INJECTION INTRAVENOUS at 13:38

## 2019-04-08 RX ADMIN — SODIUM CHLORIDE 1000 ML: 9 INJECTION, SOLUTION INTRAVENOUS at 10:11

## 2019-04-08 RX ADMIN — GEMCITABINE 2000 MG: 100 INJECTION, SOLUTION INTRAVENOUS at 12:43

## 2019-04-08 NOTE — PROGRESS NOTES
SS spoke with patient and spouse this date.  Pt starting new chemotherapy this date.  Pt and spouse states that patient is feeling a little down due to starting a new chemotherapy.  Spouse discussed son and his past history with spouse passing away at 32 with cancer.  Spouse states that granddaughter was 3 years old and is now 14 years old.  Pt and spouse appeared to enjoy talking with  and sharing their story.      Pt completed N[facility] Distress Thermometer and scored himself an 3.  Pt checked getting around, indigestion, pain, and sleep as problems.  SS will follow as needed.

## 2019-04-09 ENCOUNTER — TELEPHONE (OUTPATIENT)
Dept: ONCOLOGY | Facility: HOSPITAL | Age: 69
End: 2019-04-09

## 2019-04-09 NOTE — TELEPHONE ENCOUNTER
Called to check on patient after yesterday's treatment.  Patient's wife reports patient has had nausea but has not had any vomiting, she is giving him compazine every 6-8 hours.  Patient is eating and drinking well.

## 2019-04-11 ENCOUNTER — APPOINTMENT (OUTPATIENT)
Dept: NUCLEAR MEDICINE | Facility: HOSPITAL | Age: 69
End: 2019-04-11

## 2019-04-11 ENCOUNTER — APPOINTMENT (OUTPATIENT)
Dept: GENERAL RADIOLOGY | Facility: HOSPITAL | Age: 69
End: 2019-04-11

## 2019-04-11 ENCOUNTER — HOSPITAL ENCOUNTER (EMERGENCY)
Facility: HOSPITAL | Age: 69
Discharge: HOME | End: 2019-04-11
Attending: EMERGENCY MEDICINE | Admitting: EMERGENCY MEDICINE

## 2019-04-11 ENCOUNTER — APPOINTMENT (OUTPATIENT)
Dept: CT IMAGING | Facility: HOSPITAL | Age: 69
End: 2019-04-11

## 2019-04-11 VITALS
DIASTOLIC BLOOD PRESSURE: 83 MMHG | TEMPERATURE: 99.2 F | OXYGEN SATURATION: 100 % | WEIGHT: 186 LBS | HEART RATE: 96 BPM | RESPIRATION RATE: 18 BRPM | HEIGHT: 72 IN | BODY MASS INDEX: 25.19 KG/M2 | SYSTOLIC BLOOD PRESSURE: 129 MMHG

## 2019-04-11 DIAGNOSIS — C25.9 MALIGNANT NEOPLASM OF PANCREAS, UNSPECIFIED LOCATION OF MALIGNANCY (HCC): ICD-10-CM

## 2019-04-11 DIAGNOSIS — R07.9 CHEST PAIN, UNSPECIFIED TYPE: Primary | ICD-10-CM

## 2019-04-11 DIAGNOSIS — I95.9 TRANSIENT HYPOTENSION: ICD-10-CM

## 2019-04-11 LAB
ALBUMIN SERPL-MCNC: 2.91 G/DL (ref 3.5–5.2)
ALBUMIN/GLOB SERPL: 0.9 G/DL
ALP SERPL-CCNC: 85 U/L (ref 39–117)
ALT SERPL W P-5'-P-CCNC: 23 U/L (ref 1–41)
AMYLASE SERPL-CCNC: 31 U/L (ref 28–100)
ANION GAP SERPL CALCULATED.3IONS-SCNC: 10.2 MMOL/L
AST SERPL-CCNC: 25 U/L (ref 1–40)
BASOPHILS # BLD AUTO: 0 10*3/MM3 (ref 0–0.2)
BASOPHILS NFR BLD AUTO: 0 % (ref 0–1.5)
BILIRUB SERPL-MCNC: 1.2 MG/DL (ref 0.2–1.2)
BUN BLD-MCNC: 23 MG/DL (ref 8–23)
BUN/CREAT SERPL: 22.3 (ref 7–25)
CALCIUM SPEC-SCNC: 8.2 MG/DL (ref 8.6–10.5)
CHLORIDE SERPL-SCNC: 105 MMOL/L (ref 98–107)
CO2 SERPL-SCNC: 20.8 MMOL/L (ref 22–29)
CREAT BLD-MCNC: 1.03 MG/DL (ref 0.76–1.27)
DEPRECATED RDW RBC AUTO: 64.3 FL (ref 37–54)
EOSINOPHIL # BLD AUTO: 0.01 10*3/MM3 (ref 0–0.4)
EOSINOPHIL NFR BLD AUTO: 0.2 % (ref 0.3–6.2)
ERYTHROCYTE [DISTWIDTH] IN BLOOD BY AUTOMATED COUNT: 21.6 % (ref 12.3–15.4)
GFR SERPL CREATININE-BSD FRML MDRD: 71 ML/MIN/1.73
GLOBULIN UR ELPH-MCNC: 3.3 GM/DL
GLUCOSE BLD-MCNC: 176 MG/DL (ref 65–99)
HCT VFR BLD AUTO: 23.9 % (ref 37.5–51)
HGB BLD-MCNC: 7.6 G/DL (ref 13–17.7)
HOLD SPECIMEN: NORMAL
HOLD SPECIMEN: NORMAL
IMM GRANULOCYTES # BLD AUTO: 0.02 10*3/MM3 (ref 0–0.05)
IMM GRANULOCYTES NFR BLD AUTO: 0.3 % (ref 0–0.5)
LIPASE SERPL-CCNC: 9 U/L (ref 13–60)
LYMPHOCYTES # BLD AUTO: 0.74 10*3/MM3 (ref 0.7–3.1)
LYMPHOCYTES NFR BLD AUTO: 11.6 % (ref 19.6–45.3)
MCH RBC QN AUTO: 27.4 PG (ref 26.6–33)
MCHC RBC AUTO-ENTMCNC: 31.8 G/DL (ref 31.5–35.7)
MCV RBC AUTO: 86.3 FL (ref 79–97)
MONOCYTES # BLD AUTO: 0.03 10*3/MM3 (ref 0.1–0.9)
MONOCYTES NFR BLD AUTO: 0.5 % (ref 5–12)
NEUTROPHILS # BLD AUTO: 5.59 10*3/MM3 (ref 1.4–7)
NEUTROPHILS NFR BLD AUTO: 87.4 % (ref 42.7–76)
PLATELET # BLD AUTO: 180 10*3/MM3 (ref 140–450)
PMV BLD AUTO: 10.4 FL (ref 6–12)
POTASSIUM BLD-SCNC: 4 MMOL/L (ref 3.5–5.2)
PROT SERPL-MCNC: 6.2 G/DL (ref 6–8.5)
RBC # BLD AUTO: 2.77 10*6/MM3 (ref 4.14–5.8)
SODIUM BLD-SCNC: 136 MMOL/L (ref 136–145)
TROPONIN T SERPL-MCNC: <0.01 NG/ML (ref 0–0.03)
TROPONIN T SERPL-MCNC: <0.01 NG/ML (ref 0–0.03)
WBC NRBC COR # BLD: 6.39 10*3/MM3 (ref 3.4–10.8)
WHOLE BLOOD HOLD SPECIMEN: NORMAL
WHOLE BLOOD HOLD SPECIMEN: NORMAL

## 2019-04-11 PROCEDURE — 70450 CT HEAD/BRAIN W/O DYE: CPT

## 2019-04-11 PROCEDURE — 93010 ELECTROCARDIOGRAM REPORT: CPT | Performed by: INTERNAL MEDICINE

## 2019-04-11 PROCEDURE — 93005 ELECTROCARDIOGRAM TRACING: CPT

## 2019-04-11 PROCEDURE — 85025 COMPLETE CBC W/AUTO DIFF WBC: CPT | Performed by: EMERGENCY MEDICINE

## 2019-04-11 PROCEDURE — 83690 ASSAY OF LIPASE: CPT | Performed by: EMERGENCY MEDICINE

## 2019-04-11 PROCEDURE — 70450 CT HEAD/BRAIN W/O DYE: CPT | Performed by: RADIOLOGY

## 2019-04-11 PROCEDURE — 80053 COMPREHEN METABOLIC PANEL: CPT | Performed by: EMERGENCY MEDICINE

## 2019-04-11 PROCEDURE — 84484 ASSAY OF TROPONIN QUANT: CPT | Performed by: EMERGENCY MEDICINE

## 2019-04-11 PROCEDURE — 71045 X-RAY EXAM CHEST 1 VIEW: CPT

## 2019-04-11 PROCEDURE — 78582 LUNG VENTILAT&PERFUS IMAGING: CPT | Performed by: RADIOLOGY

## 2019-04-11 PROCEDURE — 82150 ASSAY OF AMYLASE: CPT | Performed by: EMERGENCY MEDICINE

## 2019-04-11 PROCEDURE — 0 TECHNETIUM ALBUMIN AGGREGATED: Performed by: EMERGENCY MEDICINE

## 2019-04-11 PROCEDURE — A9567 TECHNETIUM TC-99M AEROSOL: HCPCS | Performed by: EMERGENCY MEDICINE

## 2019-04-11 PROCEDURE — 78582 LUNG VENTILAT&PERFUS IMAGING: CPT

## 2019-04-11 PROCEDURE — A9540 TC99M MAA: HCPCS | Performed by: EMERGENCY MEDICINE

## 2019-04-11 PROCEDURE — 0 TECHNETIUM TC 99M PENTETATE INHALER: Performed by: EMERGENCY MEDICINE

## 2019-04-11 PROCEDURE — 99285 EMERGENCY DEPT VISIT HI MDM: CPT

## 2019-04-11 PROCEDURE — 93005 ELECTROCARDIOGRAM TRACING: CPT | Performed by: EMERGENCY MEDICINE

## 2019-04-11 PROCEDURE — 71045 X-RAY EXAM CHEST 1 VIEW: CPT | Performed by: RADIOLOGY

## 2019-04-11 RX ORDER — SODIUM CHLORIDE 0.9 % (FLUSH) 0.9 %
10 SYRINGE (ML) INJECTION AS NEEDED
Status: DISCONTINUED | OUTPATIENT
Start: 2019-04-11 | End: 2019-04-11 | Stop reason: HOSPADM

## 2019-04-11 RX ADMIN — Medication 1 DOSE: at 14:43

## 2019-04-11 RX ADMIN — SODIUM CHLORIDE 1000 ML: 9 INJECTION, SOLUTION INTRAVENOUS at 09:56

## 2019-04-11 RX ADMIN — Medication 1 DOSE: at 15:00

## 2019-04-11 NOTE — ED PROVIDER NOTES
Subjective   68-year-old white male presents with chest pain.  Patient has a history of pancreatic cancer status post Whipple procedure.  He recently had his first chemotherapy.  Today he began to have chest pain.  He also has a history of atrial fibrillation and felt that this was the cause of his pain.  His blood pressure was low at home in the 80s over 40s.  His wife was bringing him to the hospital and he is complaining of vision loss.  His blood pressure at that time was 90 systolic.  Since then his vision has improved, with the episode lasting only a few minutes.  He does not have any chest pain at this time.            Review of Systems   All other systems reviewed and are negative.      Past Medical History:   Diagnosis Date   • Antral gastritis    • Asthma    • Atrial fibrillation (CMS/HCC)     treated with oral blood thinner   • Chest pain    • COPD (chronic obstructive pulmonary disease) (CMS/HCC)    • Coronary artery disease     no stents   • Diabetes mellitus (CMS/HCC)     type 2 - checks sugar 4 times per day    • Duodenitis    • Elevated cholesterol    • Emphysema of lung (CMS/HCC)    • Gallbladder disease     removed    • GERD (gastroesophageal reflux disease)    • Hard to intubate     busted lip last time   • Hyperlipidemia    • Hypertension    • Jaundice    • Kidney stone    • Kidney stones     had lithotripsy and passed 36 kidney stones as well as had one surgically removed.   • Malignant neoplasm of head of pancreas (CMS/HCC) 9/5/2018   • Nausea    • Obstructive sleep apnea on CPAP     compliant with machine    • Palpitations    • Pneumonia 08/2018   • Reflux esophagitis    • Renal failure     stage 3 kidney disease   • Sepsis (CMS/HCC)        Allergies   Allergen Reactions   • Ativan [Lorazepam] Mental Status Change   • Chlorhexidine Hives and Rash   • Contrast Dye Other (See Comments)     Can't have due to kidney failure per family   • Fentanyl Confusion and Unknown (See Comments)     Patient  family reports extreme symptoms with fentanyl. Including confusion, restlessness, irritability an heavy sedation.       Past Surgical History:   Procedure Laterality Date   • BILE DUCT STENT PLACEMENT      [facility] 2 weeks ago    • CARDIAC CATHETERIZATION  11/01/1999   • CARDIOVASCULAR STRESS TEST  09/2012   • CHOLECYSTECTOMY N/A 8/10/2018    Procedure: CHOLECYSTECTOMY LAPAROSCOPIC;  Surgeon: Ronak Downs MD;  Location: [facility] OR;  Service: General   • COLONOSCOPY     • CYSTOSCOPY BLADDER STONE LITHOTRIPSY     • ECHO - CONVERTED  09/2012   • ERCP N/A 8/14/2018    Procedure: ENDOSCOPIC RETROGRADE CHOLANGIOPANCREATOGRAPHY WITH PAPILLOTOMY;  Surgeon: Scot Su MD;  Location: [facility] OR;  Service: Gastroenterology   • ERCP N/A 10/1/2018    Procedure: ENDOSCOPIC RETROGRADE CHOLANGIOPANCREATOGRAPHY;  Surgeon: Jefry Luna MD;  Location:  [facility];  Service: Gastroenterology   • KIDNEY STONE SURGERY     • KIDNEY STONE SURGERY      open abdominal surgery   • PORTACATH PLACEMENT Right 10/23/2018    Procedure: INSERTION OF PORTACATH;  Surgeon: Ronak Downs MD;  Location: [facility] OR;  Service: General   • TONSILLECTOMY     • UPPER GASTROINTESTINAL ENDOSCOPY  08/30/2012   • WHIPPLE PROCEDURE N/A 12/31/2018    Procedure: WHIPPLE PROCEDURE, BIOPSY OF LIVER, PORTAL VEIN RESECTION AND REPAIR, G/J TUBE PLACEMENT;  Surgeon: Miquel Brody MD;  Location: [facility] OR;  Service: General       Family History   Problem Relation Age of Onset   • Heart attack Mother    • Heart disease Mother    • Stroke Mother    • Kidney disease Mother    • Heart failure Mother    • Lung disease Father    • Tuberculosis Father    • Diabetes Sister    • Heart attack Brother    • Heart failure Brother    • Heart disease Brother    • Diabetes Sister    • No Known Problems Brother    • No Known Problems Brother        Social History     Socioeconomic History   • Marital status:       Spouse name: Not on file   • Number of children: Not on file   • Years of education: Not on file   • Highest education level: Not on file   Tobacco Use   • Smoking status: Never Smoker   • Smokeless tobacco: Never Used   Substance and Sexual Activity   • Alcohol use: No   • Drug use: No   • Sexual activity: Defer     Partners: Female   Social History Narrative    He is  and lives alone with his wife although they have 3 children together who all live close by. He is retired from the [employer] and was exposed to Agent Orange during [place]. He is a lifelong nonsmoker.            Objective   Physical Exam   Constitutional: He is oriented to person, place, and time. He appears well-developed and well-nourished.   HENT:   Head: Normocephalic and atraumatic.   Cardiovascular: Normal rate and regular rhythm. Exam reveals no gallop and no friction rub.   No murmur heard.  Pulmonary/Chest: Effort normal and breath sounds normal. He has no wheezes. He has no rhonchi. He has no rales.   Abdominal: Soft. Bowel sounds are normal. He exhibits no distension. There is no tenderness.   Healed midline surgical scar.   Musculoskeletal: Normal range of motion. He exhibits no edema.   Neurological: He is alert and oriented to person, place, and time.   Skin: Skin is warm and dry.   Psychiatric: He has a normal mood and affect.   Nursing note and vitals reviewed.      Procedures  Results for orders placed or performed during the hospital encounter of 04/11/19   Comprehensive Metabolic Panel   Result Value Ref Range    Glucose 176 (H) 65 - 99 mg/dL    BUN 23 8 - 23 mg/dL    Creatinine 1.03 0.76 - 1.27 mg/dL    Sodium 136 136 - 145 mmol/L    Potassium 4.0 3.5 - 5.2 mmol/L    Chloride 105 98 - 107 mmol/L    CO2 20.8 (L) 22.0 - 29.0 mmol/L    Calcium 8.2 (L) 8.6 - 10.5 mg/dL    Total Protein 6.2 6.0 - 8.5 g/dL    Albumin 2.91 (L) 3.50 - 5.20 g/dL    ALT (SGPT) 23 1 - 41 U/L    AST (SGOT) 25 1 - 40 U/L    Alkaline Phosphatase 85 39 -  117 U/L    Total Bilirubin 1.2 0.2 - 1.2 mg/dL    eGFR Non African Amer 71 >60 mL/min/1.73    Globulin 3.3 gm/dL    A/G Ratio 0.9 g/dL    BUN/Creatinine Ratio 22.3 7.0 - 25.0    Anion Gap 10.2 mmol/L   Troponin   Result Value Ref Range    Troponin T <0.010 0.000 - 0.030 ng/mL   Troponin   Result Value Ref Range    Troponin T <0.010 0.000 - 0.030 ng/mL   CBC Auto Differential   Result Value Ref Range    WBC 6.39 3.40 - 10.80 10*3/mm3    RBC 2.77 (L) 4.14 - 5.80 10*6/mm3    Hemoglobin 7.6 (L) 13.0 - 17.7 g/dL    Hematocrit 23.9 (L) 37.5 - 51.0 %    MCV 86.3 79.0 - 97.0 fL    MCH 27.4 26.6 - 33.0 pg    MCHC 31.8 31.5 - 35.7 g/dL    RDW 21.6 (H) 12.3 - 15.4 %    RDW-SD 64.3 (H) 37.0 - 54.0 fl    MPV 10.4 6.0 - 12.0 fL    Platelets 180 140 - 450 10*3/mm3    Neutrophil % 87.4 (H) 42.7 - 76.0 %    Lymphocyte % 11.6 (L) 19.6 - 45.3 %    Monocyte % 0.5 (L) 5.0 - 12.0 %    Eosinophil % 0.2 (L) 0.3 - 6.2 %    Basophil % 0.0 0.0 - 1.5 %    Immature Grans % 0.3 0.0 - 0.5 %    Neutrophils, Absolute 5.59 1.40 - 7.00 10*3/mm3    Lymphocytes, Absolute 0.74 0.70 - 3.10 10*3/mm3    Monocytes, Absolute 0.03 (L) 0.10 - 0.90 10*3/mm3    Eosinophils, Absolute 0.01 0.00 - 0.40 10*3/mm3    Basophils, Absolute 0.00 0.00 - 0.20 10*3/mm3    Immature Grans, Absolute 0.02 0.00 - 0.05 10*3/mm3   Lipase   Result Value Ref Range    Lipase 9 (L) 13 - 60 U/L   Amylase   Result Value Ref Range    Amylase 31 28 - 100 U/L   Light Blue Top   Result Value Ref Range    Extra Tube hold for add-on    Green Top (Gel)   Result Value Ref Range    Extra Tube Hold for add-ons.    Lavender Top   Result Value Ref Range    Extra Tube hold for add-on    Gold Top - SST   Result Value Ref Range    Extra Tube Hold for add-ons.      Xr Chest 1 View    Result Date: 4/11/2019  Narrative: XR CHEST 1 VW-  CLINICAL INDICATION: Chest Pain Triage Protocol   COMPARISON: 03/20/2019  TECHNIQUE: Single frontal view of the chest.  FINDINGS:  Minimal left basilar atelectasis The  cardiac silhouette is normal. The pulmonary vasculature is unremarkable. There is no evidence of an acute osseous abnormality. There are no suspicious-appearing parenchymal soft tissue nodules.       Impression: Minimal left basilar atelectasis  This report was finalized on 4/11/2019 9:47 AM by Dr. Ernesto Bell MD.      Ct Head Without Contrast Stroke Protocol    Result Date: 4/11/2019  Narrative: CT HEAD WO CONTRAST STROKE PROTOCOL-  CLINICAL INDICATION: Blindness and low vision   COMPARISON: 01/19/2019  TECHNIQUE: Axial images of the brain were obtained with out intravenous contrast.  Reformatted images were created in the sagittal and coronal planes.  DOSE:  Radiation dose reduction techniques were utilized per ALARA protocol. Automated exposure control was initiated through either or Linux Net[employer] or DoseRight software packages by  protocol.     FINDINGS: Today's study shows no mass, hemorrhage, or midline shift. The ventricles, cisterns, and sulci are unremarkable. There is no hydrocephalus. There is no evidence of acute ischemia. I do not see epidural or subdural hematoma. The gray-white differentiation is appropriate. The bone window setting images show no destructive calvarial lesion or acute calvarial fracture. The posterior fossa is unremarkable.       Impression: No evidence of an acute ischemic event  The results were relayed to the emergency department at 9:28 AM  This report was finalized on 4/11/2019 9:27 AM by Dr. Ernesto Bell MD.      Nm Pet Skull Base To Mid Thigh    Result Date: 3/29/2019  Narrative: NM PET SKULL BASE TO MID THIGH-  CLINICAL INDICATION: Pancreatic cancer s/p Whipple procedure with new symptoms abdominal pain n/v, unclear CT abdomen; C25.0-Malignant neoplasm of head of pancreas.   COMPARISON: CT abdomen and pelvis dated 02/26/2019.  TECHNIQUE: 15.25 mCi FDG intravenously at a blood glucose of 93 mg/dl, followed by an incubation period of 61 minutes. Routine whole torso PET  was performed. A low-dose CT was performed for attenuation correction and anatomic localization (only), without oral contrast. Standardized uptake value (SUV) was measured at selected sites. Unless otherwise specified, all SUVs refer to maximum value in the target area (mSUV) and all measurements refer to maximum axial dimensions or diameter. Measurements based on the low-dose CT are considered to be approximate.  FINDINGS:  HEAD/NECK: No FDG hypermetabolic neck adenopathy. No FDG hypermetabolic masses.  CHEST: No FDG hypermetabolic thoracic adenopathy. No FDG hypermetabolic lung nodules or masses. Evaluation for tiny parenchymal nodules is somewhat limited on low dose CT secondary to respiratory motion.  ABDOMEN/PELVIS: Physiologic FDG hypermetabolism seen throughout the solid abdominal organs. There is hypermetabolic activity along the anterior margin of the liver. This appears to correspond to trace subcapsular fluid. The maximum SUV was 5.385. This is abnormal. There is a tiny nodular density anterior to the right of midline in the peritoneum on image 234 of the axial series. This shows maximum SUV of 3.057. Also hypermetabolic activity to the left of midline adjacent to surgical clips in the anterior abdomen. This shows a maximum SUV of 4.206.  Physiologic FDG hypermetabolism seen throughout the mesentery, retroperitoneum and pelvis.  BONES: There are scattered foci of FDG hypermetabolism most suggestive of degenerative change seen throughout the axial skeleton. If concern persist for metastatic lesions of the bone, HDP Bone scan is recommended for further evaluation.      Impression:  1. Hypermetabolic activity along the anterior margin of the liver which appears to correspond with a small amount of subcapsular fluid but concerning for active disease. 2. Tiny nodular density or possibly trace fluid in the peritoneum to the right of midline as described above. 3. Hypermetabolic activity possibly bowel activity  in the lower abdomen to the left of midline adjacent to the surgical clip on image 247 of the axial series. All of these areas could represent residual or metastatic disease.  COMMENT: Please note that the low dose CT was performed to facilitate PET image reconstruction and anatomic localization and does not replace a diagnostic CT.  This report was finalized on 3/29/2019 2:13 PM by Dr. Ernesto Bell MD.      Xr Abdomen Kub    Result Date: 3/21/2019  Narrative: EXAMINATION: XR ABDOMEN KUB-  CLINICAL INDICATION: pain, nausea and vomiting; C25.0-Malignant neoplasm of head of pancreas   COMPARISON: None available.  FINDINGS: Two views of the abdomen show moderate stool in the colon.  There is scoliosis.  No evidence of bowel obstruction.      Impression: Moderate stool in the colon.  This report was finalized on 3/21/2019 10:30 AM by Dr. Ernesto Bell MD.      Xr Chest Pa & Lateral    Result Date: 3/21/2019  Narrative: XR CHEST PA AND LATERAL-  CLINICAL INDICATION: Shortness of breath; C25.0-Malignant neoplasm of head of pancreas.   COMPARISON: 01/20/2019.  TECHNIQUE: PowerPort tip in the superior vena cava.  FINDINGS: Scarring or atelectasis in the left lung base. Heart and mediastinal contours are unremarkable. No pneumothorax. Bony and soft tissue structures are unremarkable.       Impression: Significant interval improvement in aeration of the lungs.  This report was finalized on 3/21/2019 10:36 AM by Dr. Ernesto Bell MD.          ED Course  ED Course as of Apr 11 1611   Thu Apr 11, 2019   1320 Asymptomatic at this time.  He denies any chest pain or vision changes.  Workup remarkable for worsened anemia, consistent with his recent chemo.  Discussed with  who recommends CT PE protocol prior to discharge.  [BC]   1610 VQ scan low probability.  Will follow up with oncology tomorrow.  [BC]      ED Course User Index  [BC] Navin Carrillo MD                HEART Score (for prediction of 6-week risk of major  adverse cardiac event) reviewed and/or performed as part of the patient evaluation and treatment planning process.  The result associated with this review/performance is: 4       MDM  Number of Diagnoses or Management Options  Chest pain, unspecified type:   Malignant neoplasm of pancreas, unspecified location of malignancy (CMS/HCC):   Transient hypotension:      Amount and/or Complexity of Data Reviewed  Clinical lab tests: reviewed  Tests in the radiology section of CPT®: reviewed  Decide to obtain previous medical records or to obtain history from someone other than the patient: yes    Risk of Complications, Morbidity, and/or Mortality  Presenting problems: high  Diagnostic procedures: high  Management options: high          Final diagnoses:   Chest pain, unspecified type   Transient hypotension   Malignant neoplasm of pancreas, unspecified location of malignancy (CMS/HCC)            Navin Carrillo MD  04/11/19 1828

## 2019-04-11 NOTE — ED NOTES
Call nutrition for cardiac diet per Dr. Carrillo verbal order.     Nayan Metcalf, RN  04/11/19 5562

## 2019-04-12 ENCOUNTER — OFFICE VISIT (OUTPATIENT)
Dept: ONCOLOGY | Facility: CLINIC | Age: 69
End: 2019-04-12

## 2019-04-12 VITALS
TEMPERATURE: 98 F | HEART RATE: 98 BPM | DIASTOLIC BLOOD PRESSURE: 65 MMHG | BODY MASS INDEX: 23.68 KG/M2 | RESPIRATION RATE: 20 BRPM | WEIGHT: 174.6 LBS | SYSTOLIC BLOOD PRESSURE: 108 MMHG | OXYGEN SATURATION: 94 %

## 2019-04-12 DIAGNOSIS — E86.0 DEHYDRATION: ICD-10-CM

## 2019-04-12 DIAGNOSIS — C25.0 MALIGNANT NEOPLASM OF HEAD OF PANCREAS (HCC): Primary | ICD-10-CM

## 2019-04-12 DIAGNOSIS — G89.3 NEOPLASM RELATED PAIN: ICD-10-CM

## 2019-04-12 DIAGNOSIS — R93.5 ABNORMAL CT OF THE ABDOMEN: ICD-10-CM

## 2019-04-12 PROCEDURE — 99214 OFFICE O/P EST MOD 30 MIN: CPT | Performed by: INTERNAL MEDICINE

## 2019-04-12 NOTE — PROGRESS NOTES
NAME: Todd Phillips    : 1950    DATE : 19    DIAGNOSIS:   1.   Stage III moderately differentiated adenocarcinoma of the head of the pancreas with involvement of the duodenum ([id number]).    2.  Repeated episodes of bacteremia due to cholangitis -    3.  Refractory nausea/vomiting    CHIEF COMPLAINT:  Fatigue, weakness, nausea, vomiting, constipation, abdominal pain    TREATMENT:  1.       2.  On 18, Dr. Brody performed  Pancreaticoduodenectomy, wedge biopsy of the liver, portal vein resection and lateral repair and gastrojejunostomy tube placement    3.         HISTORY OF PRESENT ILLNESS:   Todd Phillips is a very pleasant 68 y.o. male who is being seen today at the request of Dr. Miquel Brody for evaluation and treatment of pancreatic cancer.  Mr. Phillips initially developed symptoms in 2018. At that time he had pain in his upper abdomen which would radiate to his back.  In July, the pain intensified.  He was seen in the ER and also by Dr. Su.  He underwent RUQ U/S and then HIDA scan and it was determined he had a dysfunctional gallbladder.  He saw Dr. Downs and had cholecystectomy on 8-10-18.  Pathology showed chronic cholecystitis and an incidental benign lymph node.  He re-presented in the post-operative period with jaundice.  Dr. Su performed ERCP on18.  He was noted to have a 3 cm irregular tight stricture of the distal common bile duct with proximal biliary ductal dilatation.  Biliary papillotomy and stricture dilatation performed and brushings taken.  A 10 Fr x 5 cm biliary stent was placed.  Brushings were taken but were negative.  There was sl dilatation of the distal pancreatic duct without discrete stricture.   He was discharged with improving jaundice on .  On  he represented with sepsis due to Klebsiella pneumonia bacteremia and was admitted.  He then was referred to Dr. Brody for evaluation and treatment.  He has been set up for EUS with biopsy  next Friday for what appears to be a primary pancreatic malignancy in the head of the pancreas.  Dr. Brody has referred him for consideration of neoadjuvant therapy.    INTERVAL HISTORY:  Mr. Phillips is here today with his wife for follow up of pancreatic cancer. He says he is doing much better today than he was yesterday. He was seen in the ED yesterday after experiencing hypotension, chest pain, blurry vision.  Evaluation including VQ scan negative.  ? transient Afib v. Hypovolemic hypotension.  He is feeling well today. He denies n/v/c/d, says his appetite is good but is still struggling to maintain hydration. He likes and drinks buttermilk often. He also denies pain today. He took one Oxycodone yesterday which controlled his pain. He continues to take Xifaxan and Eliquis and tolerating them both well. He denies any chest pain today. He received IV iron which went well. He is otherwise well and is without complaint.     PAST MEDICAL HISTORY:  Past Medical History:   Diagnosis Date   • Antral gastritis    • Asthma    • Atrial fibrillation (CMS/HCC)     treated with oral blood thinner   • Chest pain    • COPD (chronic obstructive pulmonary disease) (CMS/HCC)    • Coronary artery disease     no stents   • Diabetes mellitus (CMS/HCC)     type 2 - checks sugar 4 times per day    • Duodenitis    • Elevated cholesterol    • Emphysema of lung (CMS/HCC)    • Gallbladder disease     removed    • GERD (gastroesophageal reflux disease)    • Hard to intubate     busted lip last time   • Hyperlipidemia    • Hypertension    • Jaundice    • Kidney stone    • Kidney stones     had lithotripsy and passed 36 kidney stones as well as had one surgically removed.   • Malignant neoplasm of head of pancreas (CMS/HCC) 9/5/2018   • Nausea    • Obstructive sleep apnea on CPAP     compliant with machine    • Palpitations    • Pneumonia 08/2018   • Reflux esophagitis    • Renal failure     stage 3 kidney disease   • Sepsis (CMS/HCC)         PAST SURGICAL HISTORY:  Past Surgical History:   Procedure Laterality Date   • BILE DUCT STENT PLACEMENT      [facility] 2 weeks ago    • CARDIAC CATHETERIZATION  11/01/1999   • CARDIOVASCULAR STRESS TEST  09/2012   • CHOLECYSTECTOMY N/A 8/10/2018    Procedure: CHOLECYSTECTOMY LAPAROSCOPIC;  Surgeon: Ronak Downs MD;  Location: [facility] OR;  Service: General   • COLONOSCOPY     • CYSTOSCOPY BLADDER STONE LITHOTRIPSY     • ECHO - CONVERTED  09/2012   • ERCP N/A 8/14/2018    Procedure: ENDOSCOPIC RETROGRADE CHOLANGIOPANCREATOGRAPHY WITH PAPILLOTOMY;  Surgeon: Scot Su MD;  Location: [facility] OR;  Service: Gastroenterology   • ERCP N/A 10/1/2018    Procedure: ENDOSCOPIC RETROGRADE CHOLANGIOPANCREATOGRAPHY;  Surgeon: Jefry Luna MD;  Location:  [facility];  Service: Gastroenterology   • KIDNEY STONE SURGERY     • KIDNEY STONE SURGERY      open abdominal surgery   • PORTACATH PLACEMENT Right 10/23/2018    Procedure: INSERTION OF PORTACATH;  Surgeon: Ronak Downs MD;  Location: [facility] OR;  Service: General   • TONSILLECTOMY     • UPPER GASTROINTESTINAL ENDOSCOPY  08/30/2012   • WHIPPLE PROCEDURE N/A 12/31/2018    Procedure: WHIPPLE PROCEDURE, BIOPSY OF LIVER, PORTAL VEIN RESECTION AND REPAIR, G/J TUBE PLACEMENT;  Surgeon: Miquel Brody MD;  Location: [facility] OR;  Service: General       FAMILY HISTORY:  Family History   Problem Relation Age of Onset   • Heart attack Mother    • Heart disease Mother    • Stroke Mother    • Kidney disease Mother    • Heart failure Mother    • Lung disease Father    • Tuberculosis Father    • Diabetes Sister    • Heart attack Brother    • Heart failure Brother    • Heart disease Brother    • Diabetes Sister    • No Known Problems Brother    • No Known Problems Brother        SOCIAL HISTORY:  Social History     Socioeconomic History   • Marital status:      Spouse name: Not on file   • Number of children: Not on  file   • Years of education: Not on file   • Highest education level: Not on file   Tobacco Use   • Smoking status: Never Smoker   • Smokeless tobacco: Never Used   Substance and Sexual Activity   • Alcohol use: No   • Drug use: No   • Sexual activity: Defer     Partners: Female   Social History Narrative    He is  and lives alone with his wife although they have 3 children together who all live close by. He is retired from the [employer] and was exposed to Agent Orange during [place]. He is a lifelong nonsmoker.         A DIL  of cancer.    REVIEW OF SYSTEMS:   A comprehensive 14 point review of systems was performed.  Significant findings as mentioned above.  All other systems reviewed and are negative.      MEDICATIONS:  The current medication list was reviewed in the EMR    Current Outpatient Medications:   •  albuterol (PROVENTIL HFA;VENTOLIN HFA) 108 (90 BASE) MCG/ACT inhaler, Inhale 2 puffs Every 4 (Four) Hours As Needed for Wheezing or Shortness of Air., Disp: , Rfl:   •  albuterol (PROVENTIL) (2.5 MG/3ML) 0.083% nebulizer solution, Take 2.5 mg by nebulization 2 (Two) Times a Day As Needed for Wheezing or Shortness of Air., Disp: , Rfl:   •  allopurinol (ZYLOPRIM) 100 MG tablet, Take 1 tablet by mouth Daily., Disp: 90 tablet, Rfl: 0  •  apixaban (ELIQUIS) 5 MG tablet tablet, Take 1 tablet by mouth Every 12 (Twelve) Hours., Disp: 60 tablet, Rfl:   •  atenolol (TENORMIN) 25 MG tablet, Take 25 mg by mouth Daily As Needed., Disp: , Rfl:   •  cholecalciferol (VITAMIN D3) 1000 units tablet, Take 1,000 Units by mouth Daily., Disp: , Rfl:   •  colchicine 0.6 MG tablet, Take 1 tablet by mouth Daily. (Patient taking differently: Take 0.6 mg by mouth As Needed.), Disp: 90 tablet, Rfl: 2  •  dexamethasone (DECADRON) 4 MG tablet, Take 2 tablets by mouth in the morning on days 2, 3, and 4 after chemo., Disp: 18 tablet, Rfl: 5  •  dronabinol (MARINOL) 2.5 MG capsule, Take 1 capsule by mouth 2 (Two) Times a Day  Before Meals., Disp: 60 capsule, Rfl: 0  •  FLUoxetine (PROzac) 20 MG capsule, Take 20 mg by mouth 2 (Two) Times a Day., Disp: , Rfl:   •  granisetron (SANCUSO) 3.1 MG/24HR, Place 1 patch on the skin as directed by provider 1 (One) Time Per Week., Disp: 4 patch, Rfl: 1  •  insulin lispro (humaLOG) 100 UNIT/ML injection, Inject  under the skin into the appropriate area as directed 3 (Three) Times a Day As Needed (before meals prn)., Disp: , Rfl:   •  lactobacillus acidophilus (RISAQUAD) capsule capsule, Take 1 capsule by mouth Daily., Disp: 30 capsule, Rfl: 0  •  metoprolol tartrate (LOPRESSOR) 25 MG tablet, Take 25 mg by mouth Daily., Disp: , Rfl:   •  mometasone (ASMANEX) 220 MCG/INH inhaler, Inhale 2 puffs Every Night., Disp: , Rfl:   •  montelukast (SINGULAIR) 10 MG tablet, Take 10 mg by mouth Every Night., Disp: , Rfl:   •  nitroglycerin (NITROSTAT) 0.4 MG SL tablet, Place 0.4 mg under the tongue as needed for chest pain., Disp: , Rfl:   •  omeprazole (priLOSEC) 40 MG capsule, Take 40 mg by mouth Daily., Disp: , Rfl:   •  ondansetron (ZOFRAN) 8 MG tablet, Take 1 tablet by mouth Every 8 (Eight) Hours As Needed for Nausea or Vomiting., Disp: 60 tablet, Rfl: 3  •  oxyCODONE (ROXICODONE) 5 MG immediate release tablet, Take 1-2 tabs every 4-6 hours as needed for pain, Disp: 100 tablet, Rfl: 0  •  pancrelipase, Lip-Prot-Amyl, (CREON) 53657 units capsule delayed-release particles capsule, Take 12,000 units of lipase by mouth 3 (Three) Times a Day With Meals., Disp: , Rfl:   •  polyethylene glycol (MIRALAX) packet, Take 17 g by mouth Daily As Needed., Disp: , Rfl:   •  predniSONE (DELTASONE) 10 MG tablet, Take 4 tabs /d x 2d, 3 tabs/d x 2d, 2 tabs/d x 2d and 1 tab/d x 2d, Disp: 20 tablet, Rfl: 0  •  prochlorperazine (COMPAZINE) 10 MG tablet, Take 1 tablet by mouth Every 6 (Six) Hours As Needed for Nausea or Vomiting., Disp: 60 tablet, Rfl: 3  •  promethazine (PHENERGAN) 12.5 MG tablet, Take 1 tablet by mouth Every 6  (Six) Hours As Needed for Nausea or Vomiting., Disp: 90 tablet, Rfl: 3  •  promethazine (PHENERGAN) 25 MG suppository, Insert 1 suppository into the rectum Every 6 (Six) Hours As Needed for Nausea or Vomiting., Disp: 10 each, Rfl: 0  •  rifaximin (XIFAXAN) 550 MG tablet, Take 550 mg by mouth 2 (Two) Times a Day., Disp: , Rfl:   •  Tiotropium Bromide-Olodaterol 2.5-2.5 MCG/ACT aerosol solution, Inhale 2 puffs Daily., Disp: , Rfl:   No current facility-administered medications for this visit.     Facility-Administered Medications Ordered in Other Visits:   •  sodium chloride 0.9 % infusion  - ADS Override Pull, , , ,   •  sodium chloride 0.9 % infusion  - ADS Override Pull, , , ,     ALLERGIES:    Allergies   Allergen Reactions   • Ativan [Lorazepam] Mental Status Change   • Chlorhexidine Hives and Rash   • Contrast Dye Other (See Comments)     Can't have due to kidney failure per family   • Fentanyl Confusion and Unknown (See Comments)     Patient family reports extreme symptoms with fentanyl. Including confusion, restlessness, irritability an heavy sedation.     PHYSICAL EXAM:  Vitals:    04/12/19 1037   BP: 108/65   Pulse: 98   Resp: 20   Temp: 98 °F (36.7 °C)   SpO2: 94%   General:  Alert and oriented.  Appears fatigued but otherwise well.  HEENT:  Pupils are equal, round and reactive to light and accommodation, Extra-ocular movements full, Oropharyx clear, MMM  Neck:  No JVD, thyromegaly or lymphadenopathy  CV:  Regular rate/rhythm, no murmurs, rubs or gallops  Resp:  Lungs are clear to auscultation bilaterally  Abd:  Soft, diffuse non-specific tenderness with palpation, non-distended, bowel sounds intact, no organomegaly or masses.  Surgical incisions well-healed.  Ext:  No clubbing, cyanosis or edema  Lymph:  No cervical, supraclavicular, axillary,adenopathy  Neuro: grossly non-focal exam    PATHOLOGY:  08-15-18         12-31-18 Whipple  Final Diagnosis   1. LIVER, WEDGE BIOPSY:  Small bland ductular  proliferation compatible with bile duct adenoma.   Negative for metastatic carcinoma.   2. OMENTUM:  Mild chronic inflammation and surface adhesions; negative for neoplasm.   3. HEPATIC ARTERY LYMPH NODE:  Sinus histiocytosis; single lymph node negative for metastatic carcinoma. (0/1)  4. SUBMITTED BILE DUCT MARGIN:  Margin with chronic inflammation; negative for adenocarcinoma.   5. AORTA-CAVAL LYMPH NODES:  Two lymph nodes negative for metastatic carcinoma. (0/2)  6. PANCREATODUODENECTOMY (WHIPPLE PROCEDURE);  Invasive moderately differentiated adenocarcinoma arising in head of pancreas and involving duodenum.  Gross tumor size 3.7 cm in greatest dimension.  Perineural and peripancreatic extension identified.   Soft tissue at superior mesenteric artery positive for neoplasm.  Posterior/uncinate margin involved by neoplasm.  Five of twelve peripancreatic lymph nodes positive for metastatic neoplasm. See Template.        PANCREAS EXOCRINE:  TYPE OF SPECIMEN/PROCEDURE: Whipple procedure  SPECIMEN INTEGRITY: Intact  TUMOR SITE (HEAD, BODY, TAIL): Head  TUMOR SIZE (GREATEST DIMENSION): 3.7 cm greatest dimension  HISTOLOGIC TYPE: Adenocarcinoma  HISTOLOGIC rdGrdRrdArdDrdErd:rd rd3rd TUMOR EXTENSION: Neoplasm involves duodenum and peripancreatic fat  SURGICAL MARGINS, IF INVOLVED, (SPECIFY WHERE): Involved  MARGINS EXAMINED: See below  PARENCHYMAL MARGIN: Not involved  UNCINATE: Involved  BILE DUCT: Not involved  DISTAL MARGIN: Not involved   PROXIMAL MARGIN: Not involved   OTHER MARGINS: SMA margin involved   CLOSEST MARGIN: Margins involved    SPECIFIC MARGIN: SMA and posterior/uncinate  INVADES CELIAC AXIS OR SMA: No  VASCULAR/LYMPHATIC INVASION: Present  PERINEURAL INVASION:  Present  REGIONAL LYMPH NODES: Submitted  NUMBER OF LYMPH NODES INVOLVED: 5  NUMBER OF LYMPH NODES EXAMINED: 15  EXTRACAPSULAR EXTENSION: Focally present  TREATMENT EFFECT: No known presurgical therapy  ADDITIONAL PATHOLOGIC FINDINGS: Chronic pancreatitis    OTHER STUDIES: Available upon request  AJCC PATHOLOGIC STAGE: (COMPLETED BY PATHOLOGIST BASED ONLY ON TISSUE FINDINGS, MORE EXTENSIVE DISEASE MAY NOT BE KNOW TO THE PATHOLOGIST)  pT=  2   pN= 2  AJCC PATHOLOGIC STAGE: III     19 Abdominal Fluid:  Final Diagnosis    ABDOMINAL FLUID:  Negative for malignancy.  Acute inflammation and necrotic debris.  No tumor seen.         ENDOSCOPY:  18 ERCP with papillotomy, balloon rotation of the biliary stricture, pathology brushings, ileum stent placement (Georges)  1.  Irregular 3 cm distal common bile duct stricture concerning for neoplastic disease. Biliary papillotomy, stricture dilatation by  through the scope balloon, cytology brushings performed and 10 Azeri by 5 cm biliary stent placed.    2.  Dilated common hepatic duct to 15 mm when fully contrast filled.  Dilated intrahepatic biliary tree.    3.  No stones proximal to the stricture were identified.    4.  Slight dilatation of the distal pancreatic duct without a discrete stricture identified.    IMAGIN18 CT Abdomen Pelvis Stone Protocol   Findings  - LOWER THORAX: Clear. No effusions.  - LIVER: Homogeneous. No focal hepatic mass or ductal dilatation.  - GALLBLADDER: MINIMAL STRANDING AROUND REGION OF GALLBLADDER FOSSA  THAT MAY BE POSTSURGICAL.  - PANCREAS: Unremarkable. No mass or ductal dilatation.  - SPLEEN: Homogeneous. No splenomegaly.  - ADRENALS: No mass.  - KIDNEYS: No mass. No obstructive uropathy.  No evidence of urolithiasis.  - GI TRACT: SMALL HIATAL HERNIA.  - PERITONEUM: No free air. No free fluid or loculated fluid collections.  - MESENTERY: Unremarkable.  - LYMPH NODES: No lymphadenopathy.  - VASCULATURE: ATHEROSCLEROTIC VASCULAR CALCIFICATION.  - ABDOMINAL WALL: No focal hernia or mass.  - OTHER: None.  - BLADDER: No focal mass or significant wall thickening  - REPRODUCTIVE: Unremarkable as visualized.  - APPENDIX: Nondistended. No surrounding inflammation.  - BONES: No acute  bony abnormality.     IMPRESSION:  - Minimal stranding around region of gallbladder fossa that may be postsurgical.     08-13-18 US Liver   FINDINGS:   - Visualized pancreas is unremarkable.   - The gallbladder is absent. No collection identified.   - The CBD measures [id number] mm    .  - The liver demonstrates normal echogenicity without focal lesion.    - No ascites demonstrated.        IMPRESSION:  - As above.    08-22-18 CT Abdomen Pelvis Stone Protocol   FINDINGS:   - Minimal bibasilar atelectasis.  - Hiatal hernia.  - The liver is homogeneous. There is no evidence of focal hepatic mass.  - The spleen is homogeneous.  - Stent is noted traversing the common bile duct.  - 3 mm nodule in the right lung on image 8 of the axial series.  - There is no adrenal enlargement.  - The kidneys show no evidence of hydronephrosis or hydroureter. I do not see any distal ureteral stones.   - Otherwise I do not see any free fluid or walled off fluid collections.  - There is moderate to large volume stool in the colon.  - There is no evidence of mesenteric or retroperitoneal adenopathy.     IMPRESSION:  1. Tiny nodule in the right lung base.  2. Minimal bibasilar atelectasis.  3. Moderate to large volume stool in the colon.     Other findings as above.    08-22-18 CT Abdomen Pelvis With Contrast   FINDINGS:   - Tiny left basilar nodule measuring 4 mm on image 14 of the axial series.  - Minimal bibasilar atelectasis.  - The liver is homogeneous. There is no evidence of focal hepatic mass  - The spleen is homogeneous  - Indistinct appearance of the pancreatic head. A biliary stent is present.  - Small left adrenal nodule is present.  - The kidneys show no evidence of hydronephrosis or hydroureter. I do not see any distal ureteral stones.   - Otherwise I do not see any free fluid or walled off fluid collections.  - Large volume stool is present in the colon.     IMPRESSION:  1. Slightly enlarged, indistinct appearance of the  pancreatic head.  - Underlying neoplasm certainly not excluded but no delineable mass is present. There is a biliary stent.    2. Tiny nodule in the left lung base.    3. Small left adrenal nodule.    4. Hiatal hernia.    5. Moderate to large volume stool in the colon.      09-11-18 CT Chest Without Contrast   FINDINGS:   - Minimal coronary artery calcifications present.  - No pericardial or pleural effusions.  - No parenchymal soft tissue nodules or masses. There are findings of COPD.     IMPRESSION:  - COPD.  - Hiatal hernia.  - Minimal coronary artery calcification.       09-11-18 CT Abdomen Pelvis Without Contrast   FINDINGS:   - Lung bases show mild increased interstitial markings.  - There is a hiatal hernia.  - The liver is homogeneous. There is no evidence of focal hepatic mass.  - The spleen is homogeneous.  - Mildly indistinct appearance of the pancreas. There is a biliary stent present. I cannot exclude mild degree of pancreatitis..  - There is no adrenal enlargement.  - The kidneys show no evidence of hydronephrosis or hydroureter. I do not see any distal ureteral stones.   - Otherwise I do not see any free fluid or walled off fluid collections.  - Large volume stool is present in the colon.  - Urinary bladder wall is slightly thickened.  - There is no evidence of mesenteric or retroperitoneal adenopathy.    IMPRESSION:  1. Mildly indistinct appearance of the proximal pancreas.  2. Urinary bladder wall thickening.  3. Large volume stool in the colon.    09-14-18 CT Abdomen Without Contrast   FINDINGS:   - Again noted is very mild indistinct appearance of the pancreatic head. Stent is stable in position.  - The liver is stable and homogeneous.  - Spleen is homogeneous.  - No adrenal enlargement.  - Moderate to large volume stool in the colon.     IMPRESSION:  - No significant interval change since the previous exam.     09-21-18 NM Pet Skull Base To Mid Thigh   FINDINGS:      HEAD/NECK:  - No FDG  hypermetabolic neck adenopathy.  - No FDG hypermetabolic masses.     CHEST:   - No FDG hypermetabolic thoracic adenopathy.  - No FDG hypermetabolic lung nodules or masses.  - Evaluation for tiny parenchymal nodules is somewhat limited on low dose CT secondary to respiratory motion.     ABDOMEN/PELVIS:   - There is hypermetabolic activity in the pancreatic head with a maximum SUV of 4.971.  - Physiologic FDG hypermetabolism seen throughout the GI tract.  - Physiologic FDG hypermetabolism seen throughout the mesentery, retroperitoneum and pelvis.     BONES:   - There are scattered foci of FDG hypermetabolism most suggestive of degenerative change seen throughout the axial skeleton. If concern persist for metastatic lesions of the bone, HDP Bone scan is recommended for further evaluation.     IMPRESSION:  - Abnormal hypermetabolic activity corresponding to area of indistinctness in the pancreatic head. Maximum SUV is 4.97.    1-20-19 CT CAP with contrast:  Today's study shows mild coronary artery calcifications.     There are bilateral pleural effusions, right greater than left.     There is bibasilar consolidation. I cannot exclude pneumonia.     No parenchymal masses are seen.     IMPRESSION:  Bibasilar effusions and bibasilar consolidation.     There are bibasilar pleural effusions and there is bibasilar  consolidation.     Patient has a percutaneous gastric tube.     There is pneumoperitoneum. This may be from the percutaneous gastric  tube. The tube appears to remain intraluminal but left lateral aspect  could extend beyond the confines of the bowel.     There is a perihepatic fluid collection which contains air and is  concerning for perihepatic abscess.     The liver is homogeneous. There is no evidence of focal hepatic mass     The spleen is homogeneous     There is no peripancreatic stranding or pancreatic head mass.     There is no adrenal enlargement.     The kidneys show no evidence of hydronephrosis or  hydroureter. I do not  see any distal ureteral stones.      Small volume ascites is present.     There is no bowel wall thickening or mesenteric stranding.             IMPRESSION:  :   1. Bibasilar effusions and bibasilar consolidation.  2. Loculated perihepatic fluid collection containing air. This is  concerning for an abscess.  3. Percutaneous gastric tube is present. The tip appears to remain  intraluminal. There is, however, single focus of pneumoperitoneum which  may be associated with gastric tube placement.  4. Small volume ascites.    CT A/P without Contrast 1-24-19:  The percutaneous drain placed on 01/11/2019 and the  perihepatic abscess has been removed. There is persistent loculated  perihepatic fluid although this has markedly improved since prior to  drain placement; the collection did measure 8 x 22 x 9 cm before  drainage as one large collection and now is split into two possibly  separate smaller collections, the more posterior of the two measuring 2  x 10 x 2 cm and the more superior 4 x 6 x 3 cm (the collections may be  narrowly connected).     Percutaneous gastrojejunostomy is in place; status post Whipple. There  is a small amount of low density abdominopelvic free fluid. There is gas  in the nondependent portion of the urinary bladder, probably from recent  instrumentation. There is no evidence of bowel obstruction. Only a tiny  locule of free air persists in the mid abdomen deep to the midline  incision, improved. The kidneys, adrenal glands, pancreas and spleen are  unchanged.     There is mild body wall edema and subcutaneous gas from recent  medication injections. Small volume right and trace volume left pleural  effusions. There is near complete collapse of the right lower lobe but  there is only subsegmental atelectasis at the left lung base. No  pertinent osseous abnormality is seen.     IMPRESSION:  1. Marked improvement in the perihepatic subcapsular abscess. Drainage  catheter has  been removed.  2. Unchanged low density free fluid. Unchanged small volume right and  trace left pleural effusions.    CTCAP 02-26-19:  Findings  LUNGS: BIBASILAR LUNG FIBROTIC CHANGE.  HEART: Unremarkable.  PERICARDIUM: No effusion.  MEDIASTINUM: No masses. No enlarged lymph nodes.  No fluid collections.  PLEURA: TINY LEFT PLEURAL EFFUSION.  MAJOR AIRWAYS: Clear. No intrinsic mass.  VASCULATURE: No evidence of aneurysm.     VISUALIZED UPPER ABDOMEN:  LIVER: Homogeneous. No focal hepatic mass or ductal dilatation.  SPLEEN: Homogeneous. No splenomegaly.  ADRENALS: No mass.  KIDNEYS: No mass. No obstructive uropathy.  No evidence of urolithiasis.  GI TRACT: Non-dilated. No definite wall thickening  PERITONEUM: No free air. No free fluid or loculated fluid collections.  ABDOMINAL WALL: No focal hernia or mass.       OTHER: None.     BONES: No acute bony abnormality.     IMPRESSION:  Now only tiny pleural effusions.    Findings  LOWER THORAX: Clear. No effusions.     ABDOMEN:  LIVER: SUBCAPSULE LIVER COLLECTION MEASURING ABOUT 4.8 CM THAT WAS ABOUT 7.2 CM.  GALLBLADDER: No dilation or stone identified.  PANCREAS: Unremarkable. No mass or ductal dilatation.  SPLEEN: Homogeneous. No splenomegaly.  ADRENALS: No mass.  KIDNEYS: No mass. No obstructive uropathy.  No evidence of urolithiasis.  GI TRACT: Non-dilated. No definite wall thickening.  PERITONEUM: TINY ASCITES.  MESENTERY: Unremarkable.  LYMPH NODES: No lymphadenopathy.  VASCULATURE: No evidence of aneurysm.  ABDOMINAL WALL: No focal hernia or mass.     OTHER: None.     PELVIS:  BLADDER: No focal mass or significant wall thickening  REPRODUCTIVE: Unremarkable as visualized.  APPENDIX: Nondistended. No surrounding inflammation.  BONES: No acute bony abnormality.     IMPRESSION:  Still tiny ascites. Decreased size of a subhepatic collection.      PET/CT 03-29-19:  FINDINGS:      HEAD/NECK:  No FDG hypermetabolic neck adenopathy.  No FDG hypermetabolic masses.      CHEST:   No FDG hypermetabolic thoracic adenopathy.  No FDG hypermetabolic lung nodules or masses.  Evaluation for tiny parenchymal nodules is somewhat limited on low dose  CT secondary to respiratory motion.     ABDOMEN/PELVIS:   Physiologic FDG hypermetabolism seen throughout the solid abdominal  organs.  There is hypermetabolic activity along the anterior margin of the liver.  This appears to correspond to trace subcapsular fluid. The maximum SUV  was 5.385. This is abnormal.  There is a tiny nodular density anterior to the right of midline in the  peritoneum on image 234 of the axial series. This shows maximum SUV of  3.057. Also hypermetabolic activity to the left of midline adjacent to  surgical clips in the anterior abdomen. This shows a maximum SUV of  4.206.     Physiologic FDG hypermetabolism seen throughout the mesentery,  retroperitoneum and pelvis.     BONES: There are scattered foci of FDG hypermetabolism most suggestive  of degenerative change seen throughout the axial skeleton. If concern  persist for metastatic lesions of the bone, HDP Bone scan is recommended  for further evaluation.     IMPRESSION:     1. Hypermetabolic activity along the anterior margin of the liver which  appears to correspond with a small amount of subcapsular fluid but  concerning for active disease.  2. Tiny nodular density or possibly trace fluid in the peritoneum to the  right of midline as described above.  3. Hypermetabolic activity possibly bowel activity in the lower abdomen  to the left of midline adjacent to the surgical clip on image 247 of the  axial series. All of these areas could represent residual or metastatic  disease.  RECENT LABS:  Lab Results   Component Value Date    WBC 6.39 04/11/2019    HGB 7.6 (L) 04/11/2019    HCT 23.9 (L) 04/11/2019    MCV 86.3 04/11/2019    RDW 21.6 (H) 04/11/2019     04/11/2019    NEUTRORELPCT 87.4 (H) 04/11/2019    LYMPHORELPCT 11.6 (L) 04/11/2019    MONORELPCT 0.5 (L)  04/11/2019    EOSRELPCT 0.2 (L) 04/11/2019    BASORELPCT 0.0 04/11/2019    NEUTROABS 5.59 04/11/2019    LYMPHSABS 0.74 04/11/2019       Lab Results   Component Value Date     04/11/2019    K 4.0 04/11/2019    CO2 20.8 (L) 04/11/2019     04/11/2019    BUN 23 04/11/2019    CREATININE 1.03 04/11/2019    EGFRIFNONA 71 04/11/2019    EGFRIFAFRI  09/02/2016      Comment:      <15 Indicative of kidney failure.    GLUCOSE 176 (H) 04/11/2019    CALCIUM 8.2 (L) 04/11/2019    ALKPHOS 85 04/11/2019    AST 25 04/11/2019    ALT 23 04/11/2019    BILITOT 1.2 04/11/2019    ALBUMIN 2.91 (L) 04/11/2019    PROTEINTOT 6.2 04/11/2019    MG 1.8 02/01/2019    PHOS 2.8 01/26/2019     Lab Results   Component Value Date    FERRITIN 435.00 (H) 02/20/2019    IRON 11 (L) 02/20/2019    TIBC 196 (L) 02/20/2019    LABIRON 6 (L) 02/20/2019    PDPLMKGF80 1,361 (H) 02/20/2019    FOLATE 19.70 02/20/2019       Lab Results   Component Value Date     129.6 (H) 03/20/2019     85 (H) 02/20/2019     34 01/22/2019     104 (H) 01/07/2019     1349 (H) 12/19/2018     1089 (H) 12/10/2018     1576 (H) 11/13/2018     5149 (H) 10/19/2018     7602 (H) 09/25/2018     3636 (H) 09/07/2018     4032 (H) 08/15/2018     ASSESSMENT & PLAN:  Todd Phillips is a very pleasant 68 y.o. 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    1.  Pancreatic cancer:  -  Initially attempted neoadjuvant chemotherapy with Gemcitabine and Abraxane and he completed one cycle.  He became very weak and had to have TPN support.   -  He underwent surgical resection 12-31-18 and had Stage III disease ([id number]) moderately differentiated adenocarcinoma of the pancreas.  Tumor was 3.7 cm in maximal dimension and 5/15 associated LNs were involved.   Uncinate and SMA margins were involved.    -  There has been question about whether he should take further therapy (chemotherapy and / or radiation), but he had a very slow recovery and wasn't able to take this promptly after surgery.  -   is now rising, he has developed obstructive symptoms and he has PET posittivity at the small bowel anastomosis along with abnormal emptying study.  He also has an area of PET uptake along the anterior liver.  -  All of this is concerning for recurrent disease.  -  I recommended repeat trial of chemotherapy with Gemcitabine / Abraxane.  We discussed chemoradiation, but I was concerned he may not tolerate that well.    - He has received 1 full cycle of Gemzar/Abraxane and has started C2. He is tolerating them extremely well and his symptoms are much improved, addressed below. Will continue with treatment as planned.     2.  SBBO:    - Continues to take Xifaxan and is tolerating it well. He will continue to follow with Dr. Su.     3.  N/V/Dehydration:  -  Doing much better in this regard.  He denies any n/v today. He has Zofran, Compazine, Ativan, Phenergan and Sancuso patches at home should he need them. He understands how to take these.     4.  Neoplasm related pain:  - Continue Oxycodone PRN, typically once per day.     5.  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  He previously took oral iron but did not absorb it.    - He received Feraheme. Will recheck iron panel and ferritin in 4-6 weeks.     5.  Constipation:.  - He denies constipation today. He has Senna/Colace and Miralax should he need them.      6.   History of Atrial fibrillation:  Chronically anticoagulated on Eliquis.      7.  Prophylaxis:  Has had 2018 influenza vaccine.      8.  ACO Quality measures  - Todd Phillips does not use tobacco products.  -- Todd Phillips received 2018 flu vaccine.  - Current outpatient and discharge medications have been reconciled for the patient.  Reviewed  by:Gwen Alves MD    This note was scribed for Gwen Alves MD by Tamara Morejon RN.    I, Gwen Alves MD, personally performed the services described in this documentation as scribed by the above named individual in my presence, and it is both accurate and complete.  04/12/2019        I spent 25 minutes with Todd Phillpis today.  More than 50% of the time was spent in counseling / coordination of care for the above problems.    Electronically Signed by: Gwen Alves MD      CC:   MD Miquel Quintana MD Aaron House, MD Michael Simons, MD

## 2019-04-15 ENCOUNTER — OFFICE VISIT (OUTPATIENT)
Dept: ONCOLOGY | Facility: CLINIC | Age: 69
End: 2019-04-15

## 2019-04-15 ENCOUNTER — INFUSION (OUTPATIENT)
Dept: ONCOLOGY | Facility: HOSPITAL | Age: 69
End: 2019-04-15
Attending: INTERNAL MEDICINE

## 2019-04-15 VITALS
RESPIRATION RATE: 18 BRPM | SYSTOLIC BLOOD PRESSURE: 97 MMHG | OXYGEN SATURATION: 92 % | WEIGHT: 173.8 LBS | TEMPERATURE: 98.5 F | BODY MASS INDEX: 23.57 KG/M2 | DIASTOLIC BLOOD PRESSURE: 60 MMHG | OXYGEN SATURATION: 92 % | RESPIRATION RATE: 18 BRPM | WEIGHT: 173.8 LBS | SYSTOLIC BLOOD PRESSURE: 97 MMHG | HEART RATE: 88 BPM | DIASTOLIC BLOOD PRESSURE: 60 MMHG | HEART RATE: 88 BPM | TEMPERATURE: 98.5 F | BODY MASS INDEX: 23.57 KG/M2

## 2019-04-15 DIAGNOSIS — R11.0 NAUSEA: ICD-10-CM

## 2019-04-15 DIAGNOSIS — I48.91 ATRIAL FIBRILLATION, UNSPECIFIED TYPE (HCC): ICD-10-CM

## 2019-04-15 DIAGNOSIS — G89.3 NEOPLASM RELATED PAIN: ICD-10-CM

## 2019-04-15 DIAGNOSIS — C25.0 MALIGNANT NEOPLASM OF HEAD OF PANCREAS (HCC): Primary | ICD-10-CM

## 2019-04-15 DIAGNOSIS — K59.00 CONSTIPATION, UNSPECIFIED CONSTIPATION TYPE: ICD-10-CM

## 2019-04-15 DIAGNOSIS — E86.0 DEHYDRATION: ICD-10-CM

## 2019-04-15 DIAGNOSIS — K90.9 MALABSORPTION OF IRON: ICD-10-CM

## 2019-04-15 DIAGNOSIS — D50.9 IRON DEFICIENCY ANEMIA, UNSPECIFIED IRON DEFICIENCY ANEMIA TYPE: ICD-10-CM

## 2019-04-15 DIAGNOSIS — M10.9 ACUTE GOUT INVOLVING TOE OF RIGHT FOOT, UNSPECIFIED CAUSE: ICD-10-CM

## 2019-04-15 LAB
ALBUMIN SERPL-MCNC: 3.28 G/DL (ref 3.5–5.2)
ALBUMIN/GLOB SERPL: 1 G/DL
ALP SERPL-CCNC: 126 U/L (ref 39–117)
ALT SERPL W P-5'-P-CCNC: 27 U/L (ref 1–41)
ANION GAP SERPL CALCULATED.3IONS-SCNC: 13.4 MMOL/L
AST SERPL-CCNC: 20 U/L (ref 1–40)
BASOPHILS # BLD AUTO: 0.01 10*3/MM3 (ref 0–0.2)
BASOPHILS NFR BLD AUTO: 0.2 % (ref 0–1.5)
BILIRUB SERPL-MCNC: 0.4 MG/DL (ref 0.2–1.2)
BUN BLD-MCNC: 13 MG/DL (ref 8–23)
BUN/CREAT SERPL: 10 (ref 7–25)
CALCIUM SPEC-SCNC: 9.1 MG/DL (ref 8.6–10.5)
CHLORIDE SERPL-SCNC: 103 MMOL/L (ref 98–107)
CO2 SERPL-SCNC: 20.6 MMOL/L (ref 22–29)
CREAT BLD-MCNC: 1.3 MG/DL (ref 0.76–1.27)
DEPRECATED RDW RBC AUTO: 64.4 FL (ref 37–54)
EOSINOPHIL # BLD AUTO: 0.04 10*3/MM3 (ref 0–0.4)
EOSINOPHIL NFR BLD AUTO: 0.7 % (ref 0.3–6.2)
ERYTHROCYTE [DISTWIDTH] IN BLOOD BY AUTOMATED COUNT: 21.8 % (ref 12.3–15.4)
GFR SERPL CREATININE-BSD FRML MDRD: 55 ML/MIN/1.73
GLOBULIN UR ELPH-MCNC: 3.2 GM/DL
GLUCOSE BLD-MCNC: 183 MG/DL (ref 65–99)
HCT VFR BLD AUTO: 24.5 % (ref 37.5–51)
HGB BLD-MCNC: 7.7 G/DL (ref 13–17.7)
IMM GRANULOCYTES # BLD AUTO: 0.03 10*3/MM3 (ref 0–0.05)
IMM GRANULOCYTES NFR BLD AUTO: 0.5 % (ref 0–0.5)
LYMPHOCYTES # BLD AUTO: 1.29 10*3/MM3 (ref 0.7–3.1)
LYMPHOCYTES NFR BLD AUTO: 21.7 % (ref 19.6–45.3)
MCH RBC QN AUTO: 27 PG (ref 26.6–33)
MCHC RBC AUTO-ENTMCNC: 31.4 G/DL (ref 31.5–35.7)
MCV RBC AUTO: 86 FL (ref 79–97)
MONOCYTES # BLD AUTO: 0.34 10*3/MM3 (ref 0.1–0.9)
MONOCYTES NFR BLD AUTO: 5.7 % (ref 5–12)
NEUTROPHILS # BLD AUTO: 4.23 10*3/MM3 (ref 1.4–7)
NEUTROPHILS NFR BLD AUTO: 71.2 % (ref 42.7–76)
PLATELET # BLD AUTO: 150 10*3/MM3 (ref 140–450)
PMV BLD AUTO: 10.3 FL (ref 6–12)
POTASSIUM BLD-SCNC: 4.3 MMOL/L (ref 3.5–5.2)
PROT SERPL-MCNC: 6.5 G/DL (ref 6–8.5)
RBC # BLD AUTO: 2.85 10*6/MM3 (ref 4.14–5.8)
SODIUM BLD-SCNC: 137 MMOL/L (ref 136–145)
WBC NRBC COR # BLD: 5.94 10*3/MM3 (ref 3.4–10.8)

## 2019-04-15 PROCEDURE — 25010000002 GEMCITABINE HCL 2 GM/20ML SOLUTION 20 ML VIAL: Performed by: INTERNAL MEDICINE

## 2019-04-15 PROCEDURE — 96375 TX/PRO/DX INJ NEW DRUG ADDON: CPT

## 2019-04-15 PROCEDURE — 99214 OFFICE O/P EST MOD 30 MIN: CPT | Performed by: NURSE PRACTITIONER

## 2019-04-15 PROCEDURE — 85025 COMPLETE CBC W/AUTO DIFF WBC: CPT

## 2019-04-15 PROCEDURE — 25010000002 DEXAMETHASONE SODIUM PHOSPHATE 20 MG/5ML SOLUTION 5 ML VIAL: Performed by: INTERNAL MEDICINE

## 2019-04-15 PROCEDURE — 96367 TX/PROPH/DG ADDL SEQ IV INF: CPT

## 2019-04-15 PROCEDURE — 25010000002 PACLITAXEL PROTEIN-BOUND PART PER 1 MG: Performed by: INTERNAL MEDICINE

## 2019-04-15 PROCEDURE — 25010000002 FOSAPREPITANT PER 1 MG: Performed by: INTERNAL MEDICINE

## 2019-04-15 PROCEDURE — 96417 CHEMO IV INFUS EACH ADDL SEQ: CPT

## 2019-04-15 PROCEDURE — 80053 COMPREHEN METABOLIC PANEL: CPT

## 2019-04-15 PROCEDURE — 96413 CHEMO IV INFUSION 1 HR: CPT

## 2019-04-15 RX ORDER — PACLITAXEL 100 MG/20ML
250 INJECTION, POWDER, LYOPHILIZED, FOR SUSPENSION INTRAVENOUS ONCE
Status: COMPLETED | OUTPATIENT
Start: 2019-04-15 | End: 2019-04-15

## 2019-04-15 RX ORDER — SODIUM CHLORIDE 0.9 % (FLUSH) 0.9 %
10 SYRINGE (ML) INJECTION AS NEEDED
Status: DISCONTINUED | OUTPATIENT
Start: 2019-04-15 | End: 2019-04-15 | Stop reason: HOSPADM

## 2019-04-15 RX ORDER — SODIUM CHLORIDE 0.9 % (FLUSH) 0.9 %
10 SYRINGE (ML) INJECTION AS NEEDED
Status: CANCELLED | OUTPATIENT
Start: 2019-05-13

## 2019-04-15 RX ORDER — PREDNISONE 10 MG/1
TABLET ORAL
Qty: 20 TABLET | Refills: 0 | Status: ON HOLD | OUTPATIENT
Start: 2019-04-15 | End: 2019-04-22

## 2019-04-15 RX ADMIN — PACLITAXEL 250 MG: 100 INJECTION, POWDER, LYOPHILIZED, FOR SUSPENSION INTRAVENOUS at 12:41

## 2019-04-15 RX ADMIN — SODIUM CHLORIDE, PRESERVATIVE FREE 10 ML: 5 INJECTION INTRAVENOUS at 14:10

## 2019-04-15 RX ADMIN — SODIUM CHLORIDE 150 MG: 9 INJECTION, SOLUTION INTRAVENOUS at 11:50

## 2019-04-15 RX ADMIN — SODIUM CHLORIDE 1000 ML: 9 INJECTION, SOLUTION INTRAVENOUS at 11:50

## 2019-04-15 RX ADMIN — HEPARIN SODIUM (PORCINE) LOCK FLUSH IV SOLN 100 UNIT/ML 500 UNITS: 100 SOLUTION at 14:10

## 2019-04-15 RX ADMIN — DEXAMETHASONE SODIUM PHOSPHATE 12 MG: 4 INJECTION, SOLUTION INTRAMUSCULAR; INTRAVENOUS at 12:18

## 2019-04-15 RX ADMIN — GEMCITABINE 2000 MG: 100 INJECTION, SOLUTION INTRAVENOUS at 13:23

## 2019-04-15 NOTE — PROGRESS NOTES
NAME: Todd Phillips    : 1950    DATE : 04/15/19    DIAGNOSIS:   1.   Stage III moderately differentiated adenocarcinoma of the head of the pancreas with involvement of the duodenum ([id number]).    2.  Repeated episodes of bacteremia due to cholangitis -    3.  Refractory nausea/vomiting    CHIEF COMPLAINT:  Follow up of pancreatic cancer and toxicity check     TREATMENT:  1.       2.  On 18, Dr. Brody performed  Pancreaticoduodenectomy, wedge biopsy of the liver, portal vein resection and lateral repair and gastrojejunostomy tube placement    3.       HISTORY OF PRESENT ILLNESS:   Todd Phillips is a very pleasant 68 y.o. male who is being seen today at the request of Dr. Miquel Brody for evaluation and treatment of pancreatic cancer.  Mr. Phillips initially developed symptoms in 2018. At that time he had pain in his upper abdomen which would radiate to his back.  In July, the pain intensified.  He was seen in the ER and also by Dr. Su.  He underwent RUQ U/S and then HIDA scan and it was determined he had a dysfunctional gallbladder.  He saw Dr. Downs and had cholecystectomy on 8-10-18.  Pathology showed chronic cholecystitis and an incidental benign lymph node.  He re-presented in the post-operative period with jaundice.  Dr. Su performed ERCP on18.  He was noted to have a 3 cm irregular tight stricture of the distal common bile duct with proximal biliary ductal dilatation.  Biliary papillotomy and stricture dilatation performed and brushings taken.  A 10 Fr x 5 cm biliary stent was placed.  Brushings were taken but were negative.  There was sl dilatation of the distal pancreatic duct without discrete stricture.   He was discharged with improving jaundice on .  On  he represented with sepsis due to Klebsiella pneumonia bacteremia and was admitted.  He then was referred to Dr. Brody for evaluation and treatment.  He has been set up for EUS with biopsy next Friday for  what appears to be a primary pancreatic malignancy in the head of the pancreas.  Dr. Brody has referred him for consideration of neoadjuvant therapy.    INTERVAL HISTORY:  Mr. Phillips is here today for follow up of pancreatic cancer and toxicity check. He was seen in the ED late last week after experiencing hypotension, chest pain, blurry vision.  Evaluation including VQ scan negative.  ? transient Afib v. Hypovolemic hypotension. He followed up with Dr. Alves on Friday and was feeling much better. Overall, he reports feeling well today and is in good spirits. At present, he denies any nausea, diarrhea or constipation. He has a good appetite but struggles to maintain hydration. He says he drinks buttermilk often. He also started drinking Vitamin water which he has been tolerating well. He continues to receive IVF with home health twice weekly. He continues to take Xifaxan and Eliquis and tolerating them both well. His main complaint today is gout in his right great toe. He says this began a couple of weeks ago. He takes allopurinol 100 mg daily and was given prednisone taper which was helpful (now completed). He says his symptoms improved but have returned again and the pain in his toe kept him from sleeping well last night. Therefore, he took prn oxycodone which helped with his pain. He is otherwise well and is without complaint. He has received 1 full cycle of Gemzar/Abraxane and is here for cycle 2, day 8. He is tolerating this well without any significant SE.     PAST MEDICAL HISTORY:  Past Medical History:   Diagnosis Date   • Antral gastritis    • Asthma    • Atrial fibrillation (CMS/HCC)     treated with oral blood thinner   • Chest pain    • COPD (chronic obstructive pulmonary disease) (CMS/HCC)    • Coronary artery disease     no stents   • Diabetes mellitus (CMS/HCC)     type 2 - checks sugar 4 times per day    • Duodenitis    • Elevated cholesterol    • Emphysema of lung (CMS/HCC)    • Gallbladder  disease     removed    • GERD (gastroesophageal reflux disease)    • Hard to intubate     busted lip last time   • Hyperlipidemia    • Hypertension    • Jaundice    • Kidney stone    • Kidney stones     had lithotripsy and passed 36 kidney stones as well as had one surgically removed.   • Malignant neoplasm of head of pancreas (CMS/HCC) 9/5/2018   • Nausea    • Obstructive sleep apnea on CPAP     compliant with machine    • Palpitations    • Pneumonia 08/2018   • Reflux esophagitis    • Renal failure     stage 3 kidney disease   • Sepsis (CMS/HCC)        PAST SURGICAL HISTORY:  Past Surgical History:   Procedure Laterality Date   • BILE DUCT STENT PLACEMENT      [facility] 2 weeks ago    • CARDIAC CATHETERIZATION  11/01/1999   • CARDIOVASCULAR STRESS TEST  09/2012   • CHOLECYSTECTOMY N/A 8/10/2018    Procedure: CHOLECYSTECTOMY LAPAROSCOPIC;  Surgeon: Ronak Downs MD;  Location: [facility] OR;  Service: General   • COLONOSCOPY     • CYSTOSCOPY BLADDER STONE LITHOTRIPSY     • ECHO - CONVERTED  09/2012   • ERCP N/A 8/14/2018    Procedure: ENDOSCOPIC RETROGRADE CHOLANGIOPANCREATOGRAPHY WITH PAPILLOTOMY;  Surgeon: Scot Su MD;  Location:  [facility] OR;  Service: Gastroenterology   • ERCP N/A 10/1/2018    Procedure: ENDOSCOPIC RETROGRADE CHOLANGIOPANCREATOGRAPHY;  Surgeon: Jefry Luna MD;  Location:  [facility];  Service: Gastroenterology   • KIDNEY STONE SURGERY     • KIDNEY STONE SURGERY      open abdominal surgery   • PORTACATH PLACEMENT Right 10/23/2018    Procedure: INSERTION OF PORTACATH;  Surgeon: Ronak Downs MD;  Location:  [facility] OR;  Service: General   • TONSILLECTOMY     • UPPER GASTROINTESTINAL ENDOSCOPY  08/30/2012   • WHIPPLE PROCEDURE N/A 12/31/2018    Procedure: WHIPPLE PROCEDURE, BIOPSY OF LIVER, PORTAL VEIN RESECTION AND REPAIR, G/J TUBE PLACEMENT;  Surgeon: Miquel Brody MD;  Location:  [facility] OR;  Service: General       FAMILY  HISTORY:  Family History   Problem Relation Age of Onset   • Heart attack Mother    • Heart disease Mother    • Stroke Mother    • Kidney disease Mother    • Heart failure Mother    • Lung disease Father    • Tuberculosis Father    • Diabetes Sister    • Heart attack Brother    • Heart failure Brother    • Heart disease Brother    • Diabetes Sister    • No Known Problems Brother    • No Known Problems Brother        SOCIAL HISTORY:  Social History     Socioeconomic History   • Marital status:      Spouse name: Not on file   • Number of children: Not on file   • Years of education: Not on file   • Highest education level: Not on file   Tobacco Use   • Smoking status: Never Smoker   • Smokeless tobacco: Never Used   Substance and Sexual Activity   • Alcohol use: No   • Drug use: No   • Sexual activity: Defer     Partners: Female   Social History Narrative    He is  and lives alone with his wife although they have 3 children together who all live close by. He is retired from the [employer] and was exposed to Agent Orange during [place]. He is a lifelong nonsmoker.         A DIL  of cancer.    REVIEW OF SYSTEMS:   A comprehensive 14 point review of systems was performed.  Significant findings as mentioned above.  All other systems reviewed and are negative.      MEDICATIONS:  The current medication list was reviewed in the EMR    Current Outpatient Medications:   •  albuterol (PROVENTIL HFA;VENTOLIN HFA) 108 (90 BASE) MCG/ACT inhaler, Inhale 2 puffs Every 4 (Four) Hours As Needed for Wheezing or Shortness of Air., Disp: , Rfl:   •  albuterol (PROVENTIL) (2.5 MG/3ML) 0.083% nebulizer solution, Take 2.5 mg by nebulization 2 (Two) Times a Day As Needed for Wheezing or Shortness of Air., Disp: , Rfl:   •  allopurinol (ZYLOPRIM) 100 MG tablet, Take 1 tablet by mouth Daily., Disp: 90 tablet, Rfl: 0  •  apixaban (ELIQUIS) 5 MG tablet tablet, Take 1 tablet by mouth Every 12 (Twelve) Hours., Disp: 60 tablet,  Rfl:   •  atenolol (TENORMIN) 25 MG tablet, Take 25 mg by mouth Daily As Needed., Disp: , Rfl:   •  cholecalciferol (VITAMIN D3) 1000 units tablet, Take 1,000 Units by mouth Daily., Disp: , Rfl:   •  colchicine 0.6 MG tablet, Take 1 tablet by mouth Daily. (Patient taking differently: Take 0.6 mg by mouth As Needed.), Disp: 90 tablet, Rfl: 2  •  dexamethasone (DECADRON) 4 MG tablet, Take 2 tablets by mouth in the morning on days 2, 3, and 4 after chemo., Disp: 18 tablet, Rfl: 5  •  dronabinol (MARINOL) 2.5 MG capsule, Take 1 capsule by mouth 2 (Two) Times a Day Before Meals., Disp: 60 capsule, Rfl: 0  •  FLUoxetine (PROzac) 20 MG capsule, Take 20 mg by mouth 2 (Two) Times a Day., Disp: , Rfl:   •  granisetron (SANCUSO) 3.1 MG/24HR, Place 1 patch on the skin as directed by provider 1 (One) Time Per Week., Disp: 4 patch, Rfl: 1  •  insulin lispro (humaLOG) 100 UNIT/ML injection, Inject  under the skin into the appropriate area as directed 3 (Three) Times a Day As Needed (before meals prn)., Disp: , Rfl:   •  lactobacillus acidophilus (RISAQUAD) capsule capsule, Take 1 capsule by mouth Daily., Disp: 30 capsule, Rfl: 0  •  metoprolol tartrate (LOPRESSOR) 25 MG tablet, Take 25 mg by mouth Daily., Disp: , Rfl:   •  mometasone (ASMANEX) 220 MCG/INH inhaler, Inhale 2 puffs Every Night., Disp: , Rfl:   •  montelukast (SINGULAIR) 10 MG tablet, Take 10 mg by mouth Every Night., Disp: , Rfl:   •  nitroglycerin (NITROSTAT) 0.4 MG SL tablet, Place 0.4 mg under the tongue as needed for chest pain., Disp: , Rfl:   •  omeprazole (priLOSEC) 40 MG capsule, Take 40 mg by mouth Daily., Disp: , Rfl:   •  ondansetron (ZOFRAN) 8 MG tablet, Take 1 tablet by mouth Every 8 (Eight) Hours As Needed for Nausea or Vomiting., Disp: 60 tablet, Rfl: 3  •  oxyCODONE (ROXICODONE) 5 MG immediate release tablet, Take 1-2 tabs every 4-6 hours as needed for pain, Disp: 100 tablet, Rfl: 0  •  pancrelipase, Lip-Prot-Amyl, (CREON) 92619 units capsule  delayed-release particles capsule, Take 12,000 units of lipase by mouth 3 (Three) Times a Day With Meals., Disp: , Rfl:   •  polyethylene glycol (MIRALAX) packet, Take 17 g by mouth Daily As Needed., Disp: , Rfl:   •  predniSONE (DELTASONE) 10 MG tablet, Take 4 tabs /d x 2d, 3 tabs/d x 2d, 2 tabs/d x 2d and 1 tab/d x 2d, Disp: 20 tablet, Rfl: 0  •  prochlorperazine (COMPAZINE) 10 MG tablet, Take 1 tablet by mouth Every 6 (Six) Hours As Needed for Nausea or Vomiting., Disp: 60 tablet, Rfl: 3  •  promethazine (PHENERGAN) 12.5 MG tablet, Take 1 tablet by mouth Every 6 (Six) Hours As Needed for Nausea or Vomiting., Disp: 90 tablet, Rfl: 3  •  promethazine (PHENERGAN) 25 MG suppository, Insert 1 suppository into the rectum Every 6 (Six) Hours As Needed for Nausea or Vomiting., Disp: 10 each, Rfl: 0  •  rifaximin (XIFAXAN) 550 MG tablet, Take 550 mg by mouth 2 (Two) Times a Day., Disp: , Rfl:   •  Tiotropium Bromide-Olodaterol 2.5-2.5 MCG/ACT aerosol solution, Inhale 2 puffs Daily., Disp: , Rfl:   No current facility-administered medications for this visit.     Facility-Administered Medications Ordered in Other Visits:   •  sodium chloride 0.9 % infusion  - ADS Override Pull, , , ,   •  sodium chloride 0.9 % infusion  - ADS Override Pull, , , ,     ALLERGIES:    Allergies   Allergen Reactions   • Chlorhexidine Hives and Rash   • Contrast Dye Other (See Comments)     Can't have due to kidney failure per family   • Fentanyl Confusion and Unknown (See Comments)     Patient family reports extreme symptoms with fentanyl. Including confusion, restlessness, irritability an heavy sedation.     PHYSICAL EXAM:  Vitals:    04/15/19 1010   BP: 97/60   Pulse: 88   Resp: 18   Temp: 98.5 °F (36.9 °C)   SpO2: 92%   General:  Alert and oriented.  Appears well, in good spirits today.   HEENT:  Pupils are equal, round and reactive to light and accommodation, Extra-ocular movements full, Oropharyx clear, MMM  Neck:  No JVD, thyromegaly or  lymphadenopathy  CV:  Regular rate/rhythm, no murmurs, rubs or gallops  Resp:  Lungs are clear to auscultation bilaterally  Abd:  Soft, diffuse non-specific tenderness with palpation, non-distended, bowel sounds intact, no organomegaly or masses.  Surgical incisions well-healed.  Ext:  No clubbing, cyanosis or edema. +gout in right great toe, erythematous, warm and tender   Lymph:  No cervical, supraclavicular, axillary,adenopathy  Neuro: grossly non-focal exam    PATHOLOGY:  08-15-18         12-31-18 Whipple  Final Diagnosis   1. LIVER, WEDGE BIOPSY:  Small bland ductular proliferation compatible with bile duct adenoma.   Negative for metastatic carcinoma.   2. OMENTUM:  Mild chronic inflammation and surface adhesions; negative for neoplasm.   3. HEPATIC ARTERY LYMPH NODE:  Sinus histiocytosis; single lymph node negative for metastatic carcinoma. (0/1)  4. SUBMITTED BILE DUCT MARGIN:  Margin with chronic inflammation; negative for adenocarcinoma.   5. AORTA-CAVAL LYMPH NODES:  Two lymph nodes negative for metastatic carcinoma. (0/2)  6. PANCREATODUODENECTOMY (WHIPPLE PROCEDURE);  Invasive moderately differentiated adenocarcinoma arising in head of pancreas and involving duodenum.  Gross tumor size 3.7 cm in greatest dimension.  Perineural and peripancreatic extension identified.   Soft tissue at superior mesenteric artery positive for neoplasm.  Posterior/uncinate margin involved by neoplasm.  Five of twelve peripancreatic lymph nodes positive for metastatic neoplasm. See Template.        PANCREAS EXOCRINE:  TYPE OF SPECIMEN/PROCEDURE: Whipple procedure  SPECIMEN INTEGRITY: Intact  TUMOR SITE (HEAD, BODY, TAIL): Head  TUMOR SIZE (GREATEST DIMENSION): 3.7 cm greatest dimension  HISTOLOGIC TYPE: Adenocarcinoma  HISTOLOGIC stGstRstAstDstEst:st st1st TUMOR EXTENSION: Neoplasm involves duodenum and peripancreatic fat  SURGICAL MARGINS, IF INVOLVED, (SPECIFY WHERE): Involved  MARGINS EXAMINED: See below  PARENCHYMAL MARGIN: Not  involved  UNCINATE: Involved  BILE DUCT: Not involved  DISTAL MARGIN: Not involved   PROXIMAL MARGIN: Not involved   OTHER MARGINS: SMA margin involved   CLOSEST MARGIN: Margins involved    SPECIFIC MARGIN: SMA and posterior/uncinate  INVADES CELIAC AXIS OR SMA: No  VASCULAR/LYMPHATIC INVASION: Present  PERINEURAL INVASION:  Present  REGIONAL LYMPH NODES: Submitted  NUMBER OF LYMPH NODES INVOLVED: 5  NUMBER OF LYMPH NODES EXAMINED: 15  EXTRACAPSULAR EXTENSION: Focally present  TREATMENT EFFECT: No known presurgical therapy  ADDITIONAL PATHOLOGIC FINDINGS: Chronic pancreatitis   OTHER STUDIES: Available upon request  AJCC PATHOLOGIC STAGE: (COMPLETED BY PATHOLOGIST BASED ONLY ON TISSUE FINDINGS, MORE EXTENSIVE DISEASE MAY NOT BE KNOW TO THE PATHOLOGIST)  pT=  2   pN= 2  AJCC PATHOLOGIC STAGE: III     19 Abdominal Fluid:  Final Diagnosis    ABDOMINAL FLUID:  Negative for malignancy.  Acute inflammation and necrotic debris.  No tumor seen.         ENDOSCOPY:  18 ERCP with papillotomy, balloon rotation of the biliary stricture, pathology brushings, ileum stent placement (Georges)  1.  Irregular 3 cm distal common bile duct stricture concerning for neoplastic disease. Biliary papillotomy, stricture dilatation by  through the scope balloon, cytology brushings performed and 10 Japanese by 5 cm biliary stent placed.    2.  Dilated common hepatic duct to 15 mm when fully contrast filled.  Dilated intrahepatic biliary tree.    3.  No stones proximal to the stricture were identified.    4.  Slight dilatation of the distal pancreatic duct without a discrete stricture identified.    IMAGIN18 CT Abdomen Pelvis Stone Protocol   Findings  - LOWER THORAX: Clear. No effusions.  - LIVER: Homogeneous. No focal hepatic mass or ductal dilatation.  - GALLBLADDER: MINIMAL STRANDING AROUND REGION OF GALLBLADDER FOSSA  THAT MAY BE POSTSURGICAL.  - PANCREAS: Unremarkable. No mass or ductal dilatation.  - SPLEEN: Homogeneous.  No splenomegaly.  - ADRENALS: No mass.  - KIDNEYS: No mass. No obstructive uropathy.  No evidence of urolithiasis.  - GI TRACT: SMALL HIATAL HERNIA.  - PERITONEUM: No free air. No free fluid or loculated fluid collections.  - MESENTERY: Unremarkable.  - LYMPH NODES: No lymphadenopathy.  - VASCULATURE: ATHEROSCLEROTIC VASCULAR CALCIFICATION.  - ABDOMINAL WALL: No focal hernia or mass.  - OTHER: None.  - BLADDER: No focal mass or significant wall thickening  - REPRODUCTIVE: Unremarkable as visualized.  - APPENDIX: Nondistended. No surrounding inflammation.  - BONES: No acute bony abnormality.     IMPRESSION:  - Minimal stranding around region of gallbladder fossa that may be postsurgical.     08-13-18 US Liver   FINDINGS:   - Visualized pancreas is unremarkable.   - The gallbladder is absent. No collection identified.   - The CBD measures 10.98960918910 mm    .  - The liver demonstrates normal echogenicity without focal lesion.    - No ascites demonstrated.        IMPRESSION:  - As above.    08-22-18 CT Abdomen Pelvis Stone Protocol   FINDINGS:   - Minimal bibasilar atelectasis.  - Hiatal hernia.  - The liver is homogeneous. There is no evidence of focal hepatic mass.  - The spleen is homogeneous.  - Stent is noted traversing the common bile duct.  - 3 mm nodule in the right lung on image 8 of the axial series.  - There is no adrenal enlargement.  - The kidneys show no evidence of hydronephrosis or hydroureter. I do not see any distal ureteral stones.   - Otherwise I do not see any free fluid or walled off fluid collections.  - There is moderate to large volume stool in the colon.  - There is no evidence of mesenteric or retroperitoneal adenopathy.     IMPRESSION:  1. Tiny nodule in the right lung base.  2. Minimal bibasilar atelectasis.  3. Moderate to large volume stool in the colon.     Other findings as above.    08-22-18 CT Abdomen Pelvis With Contrast   FINDINGS:   - Tiny left basilar nodule measuring 4 mm on  image 14 of the axial series.  - Minimal bibasilar atelectasis.  - The liver is homogeneous. There is no evidence of focal hepatic mass  - The spleen is homogeneous  - Indistinct appearance of the pancreatic head. A biliary stent is present.  - Small left adrenal nodule is present.  - The kidneys show no evidence of hydronephrosis or hydroureter. I do not see any distal ureteral stones.   - Otherwise I do not see any free fluid or walled off fluid collections.  - Large volume stool is present in the colon.     IMPRESSION:  1. Slightly enlarged, indistinct appearance of the pancreatic head.  - Underlying neoplasm certainly not excluded but no delineable mass is present. There is a biliary stent.    2. Tiny nodule in the left lung base.    3. Small left adrenal nodule.    4. Hiatal hernia.    5. Moderate to large volume stool in the colon.      09-11-18 CT Chest Without Contrast   FINDINGS:   - Minimal coronary artery calcifications present.  - No pericardial or pleural effusions.  - No parenchymal soft tissue nodules or masses. There are findings of COPD.     IMPRESSION:  - COPD.  - Hiatal hernia.  - Minimal coronary artery calcification.       09-11-18 CT Abdomen Pelvis Without Contrast   FINDINGS:   - Lung bases show mild increased interstitial markings.  - There is a hiatal hernia.  - The liver is homogeneous. There is no evidence of focal hepatic mass.  - The spleen is homogeneous.  - Mildly indistinct appearance of the pancreas. There is a biliary stent present. I cannot exclude mild degree of pancreatitis..  - There is no adrenal enlargement.  - The kidneys show no evidence of hydronephrosis or hydroureter. I do not see any distal ureteral stones.   - Otherwise I do not see any free fluid or walled off fluid collections.  - Large volume stool is present in the colon.  - Urinary bladder wall is slightly thickened.  - There is no evidence of mesenteric or retroperitoneal adenopathy.    IMPRESSION:  1. Mildly  indistinct appearance of the proximal pancreas.  2. Urinary bladder wall thickening.  3. Large volume stool in the colon.    09-14-18 CT Abdomen Without Contrast   FINDINGS:   - Again noted is very mild indistinct appearance of the pancreatic head. Stent is stable in position.  - The liver is stable and homogeneous.  - Spleen is homogeneous.  - No adrenal enlargement.  - Moderate to large volume stool in the colon.     IMPRESSION:  - No significant interval change since the previous exam.     09-21-18 NM Pet Skull Base To Mid Thigh   FINDINGS:      HEAD/NECK:  - No FDG hypermetabolic neck adenopathy.  - No FDG hypermetabolic masses.     CHEST:   - No FDG hypermetabolic thoracic adenopathy.  - No FDG hypermetabolic lung nodules or masses.  - Evaluation for tiny parenchymal nodules is somewhat limited on low dose CT secondary to respiratory motion.     ABDOMEN/PELVIS:   - There is hypermetabolic activity in the pancreatic head with a maximum SUV of 4.971.  - Physiologic FDG hypermetabolism seen throughout the GI tract.  - Physiologic FDG hypermetabolism seen throughout the mesentery, retroperitoneum and pelvis.     BONES:   - There are scattered foci of FDG hypermetabolism most suggestive of degenerative change seen throughout the axial skeleton. If concern persist for metastatic lesions of the bone, HDP Bone scan is recommended for further evaluation.     IMPRESSION:  - Abnormal hypermetabolic activity corresponding to area of indistinctness in the pancreatic head. Maximum SUV is 4.97.    1-20-19 CT CAP with contrast:  Today's study shows mild coronary artery calcifications.     There are bilateral pleural effusions, right greater than left.     There is bibasilar consolidation. I cannot exclude pneumonia.     No parenchymal masses are seen.     IMPRESSION:  Bibasilar effusions and bibasilar consolidation.     There are bibasilar pleural effusions and there is bibasilar  consolidation.     Patient has a percutaneous  gastric tube.     There is pneumoperitoneum. This may be from the percutaneous gastric  tube. The tube appears to remain intraluminal but left lateral aspect  could extend beyond the confines of the bowel.     There is a perihepatic fluid collection which contains air and is  concerning for perihepatic abscess.     The liver is homogeneous. There is no evidence of focal hepatic mass     The spleen is homogeneous     There is no peripancreatic stranding or pancreatic head mass.     There is no adrenal enlargement.     The kidneys show no evidence of hydronephrosis or hydroureter. I do not  see any distal ureteral stones.      Small volume ascites is present.     There is no bowel wall thickening or mesenteric stranding.             IMPRESSION:  :   1. Bibasilar effusions and bibasilar consolidation.  2. Loculated perihepatic fluid collection containing air. This is  concerning for an abscess.  3. Percutaneous gastric tube is present. The tip appears to remain  intraluminal. There is, however, single focus of pneumoperitoneum which  may be associated with gastric tube placement.  4. Small volume ascites.    CT A/P without Contrast 1-24-19:  The percutaneous drain placed on 01/11/2019 and the  perihepatic abscess has been removed. There is persistent loculated  perihepatic fluid although this has markedly improved since prior to  drain placement; the collection did measure 8 x 22 x 9 cm before  drainage as one large collection and now is split into two possibly  separate smaller collections, the more posterior of the two measuring 2  x 10 x 2 cm and the more superior 4 x 6 x 3 cm (the collections may be  narrowly connected).     Percutaneous gastrojejunostomy is in place; status post Whipple. There  is a small amount of low density abdominopelvic free fluid. There is gas  in the nondependent portion of the urinary bladder, probably from recent  instrumentation. There is no evidence of bowel obstruction. Only a  tiny  locule of free air persists in the mid abdomen deep to the midline  incision, improved. The kidneys, adrenal glands, pancreas and spleen are  unchanged.     There is mild body wall edema and subcutaneous gas from recent  medication injections. Small volume right and trace volume left pleural  effusions. There is near complete collapse of the right lower lobe but  there is only subsegmental atelectasis at the left lung base. No  pertinent osseous abnormality is seen.     IMPRESSION:  1. Marked improvement in the perihepatic subcapsular abscess. Drainage  catheter has been removed.  2. Unchanged low density free fluid. Unchanged small volume right and  trace left pleural effusions.    CTCAP 02-26-19:  Findings  LUNGS: BIBASILAR LUNG FIBROTIC CHANGE.  HEART: Unremarkable.  PERICARDIUM: No effusion.  MEDIASTINUM: No masses. No enlarged lymph nodes.  No fluid collections.  PLEURA: TINY LEFT PLEURAL EFFUSION.  MAJOR AIRWAYS: Clear. No intrinsic mass.  VASCULATURE: No evidence of aneurysm.     VISUALIZED UPPER ABDOMEN:  LIVER: Homogeneous. No focal hepatic mass or ductal dilatation.  SPLEEN: Homogeneous. No splenomegaly.  ADRENALS: No mass.  KIDNEYS: No mass. No obstructive uropathy.  No evidence of urolithiasis.  GI TRACT: Non-dilated. No definite wall thickening  PERITONEUM: No free air. No free fluid or loculated fluid collections.  ABDOMINAL WALL: No focal hernia or mass.       OTHER: None.     BONES: No acute bony abnormality.     IMPRESSION:  Now only tiny pleural effusions.    Findings  LOWER THORAX: Clear. No effusions.     ABDOMEN:  LIVER: SUBCAPSULE LIVER COLLECTION MEASURING ABOUT 4.8 CM THAT WAS ABOUT 7.2 CM.  GALLBLADDER: No dilation or stone identified.  PANCREAS: Unremarkable. No mass or ductal dilatation.  SPLEEN: Homogeneous. No splenomegaly.  ADRENALS: No mass.  KIDNEYS: No mass. No obstructive uropathy.  No evidence of urolithiasis.  GI TRACT: Non-dilated. No definite wall thickening.  PERITONEUM:  TINY ASCITES.  MESENTERY: Unremarkable.  LYMPH NODES: No lymphadenopathy.  VASCULATURE: No evidence of aneurysm.  ABDOMINAL WALL: No focal hernia or mass.     OTHER: None.     PELVIS:  BLADDER: No focal mass or significant wall thickening  REPRODUCTIVE: Unremarkable as visualized.  APPENDIX: Nondistended. No surrounding inflammation.  BONES: No acute bony abnormality.     IMPRESSION:  Still tiny ascites. Decreased size of a subhepatic collection.      PET/CT 03-29-19:  FINDINGS:      HEAD/NECK:  No FDG hypermetabolic neck adenopathy.  No FDG hypermetabolic masses.     CHEST:   No FDG hypermetabolic thoracic adenopathy.  No FDG hypermetabolic lung nodules or masses.  Evaluation for tiny parenchymal nodules is somewhat limited on low dose  CT secondary to respiratory motion.     ABDOMEN/PELVIS:   Physiologic FDG hypermetabolism seen throughout the solid abdominal  organs.  There is hypermetabolic activity along the anterior margin of the liver.  This appears to correspond to trace subcapsular fluid. The maximum SUV  was 5.385. This is abnormal.  There is a tiny nodular density anterior to the right of midline in the  peritoneum on image 234 of the axial series. This shows maximum SUV of  3.057. Also hypermetabolic activity to the left of midline adjacent to  surgical clips in the anterior abdomen. This shows a maximum SUV of  4.206.     Physiologic FDG hypermetabolism seen throughout the mesentery,  retroperitoneum and pelvis.     BONES: There are scattered foci of FDG hypermetabolism most suggestive  of degenerative change seen throughout the axial skeleton. If concern  persist for metastatic lesions of the bone, HDP Bone scan is recommended  for further evaluation.     IMPRESSION:     1. Hypermetabolic activity along the anterior margin of the liver which  appears to correspond with a small amount of subcapsular fluid but  concerning for active disease.  2. Tiny nodular density or possibly trace fluid in the  peritoneum to the  right of midline as described above.  3. Hypermetabolic activity possibly bowel activity in the lower abdomen  to the left of midline adjacent to the surgical clip on image 247 of the  axial series. All of these areas could represent residual or metastatic  disease.  RECENT LABS:  Lab Results   Component Value Date    WBC 5.94 04/15/2019    HGB 7.7 (L) 04/15/2019    HCT 24.5 (L) 04/15/2019    MCV 86.0 04/15/2019    RDW 21.8 (H) 04/15/2019     04/15/2019    NEUTRORELPCT 71.2 04/15/2019    LYMPHORELPCT 21.7 04/15/2019    MONORELPCT 5.7 04/15/2019    EOSRELPCT 0.7 04/15/2019    BASORELPCT 0.2 04/15/2019    NEUTROABS 4.23 04/15/2019    LYMPHSABS 1.29 04/15/2019       Lab Results   Component Value Date     04/15/2019    K 4.3 04/15/2019    CO2 20.6 (L) 04/15/2019     04/15/2019    BUN 13 04/15/2019    CREATININE 1.30 (H) 04/15/2019    EGFRIFNONA 55 (L) 04/15/2019    EGFRIFAFRI  09/02/2016      Comment:      <15 Indicative of kidney failure.    GLUCOSE 183 (H) 04/15/2019    CALCIUM 9.1 04/15/2019    ALKPHOS 126 (H) 04/15/2019    AST 20 04/15/2019    ALT 27 04/15/2019    BILITOT 0.4 04/15/2019    ALBUMIN 3.28 (L) 04/15/2019    PROTEINTOT 6.5 04/15/2019    MG 1.8 02/01/2019    PHOS 2.8 01/26/2019     Lab Results   Component Value Date    FERRITIN 435.00 (H) 02/20/2019    IRON 11 (L) 02/20/2019    TIBC 196 (L) 02/20/2019    LABIRON 6 (L) 02/20/2019    SGKDIQKS01 1,361 (H) 02/20/2019    FOLATE 19.70 02/20/2019       Lab Results   Component Value Date     129.6 (H) 03/20/2019     85 (H) 02/20/2019     34 01/22/2019     104 (H) 01/07/2019     1349 (H) 12/19/2018     1089 (H) 12/10/2018     1576 (H) 11/13/2018     5149 (H) 10/19/2018     7602 (H) 09/25/2018     3636 (H) 09/07/2018     4032 (H) 08/15/2018     ASSESSMENT & PLAN:  Todd Phillips is a very pleasant 68 y.o. male with Stage III moderately differentiated adenocarcinoma of the  head of the pancreas with involvement of the duodenum ([id number]), now with evidence of anastomotic recurrence as well as involvement of the liver and rising .    1.  Pancreatic cancer:  -  Initially attempted neoadjuvant chemotherapy with Gemcitabine and Abraxane and he completed one cycle.  He became very weak and had to have TPN support.   -  He underwent surgical resection 12-31-18 and had Stage III disease ([id number]) moderately differentiated adenocarcinoma of the pancreas.  Tumor was 3.7 cm in maximal dimension and 5/15 associated LNs were involved.  Uncinate and SMA margins were involved.    -  There has been question about whether he should take further therapy (chemotherapy and / or radiation), but he had a very slow recovery and wasn't able to take this promptly after surgery.  -   is now rising, he has developed obstructive symptoms and he has PET positivity at the small bowel anastomosis along with abnormal emptying study.  He also has an area of PET uptake along the anterior liver.  -  All of this is concerning for recurrent disease.  -  Recommended repeat trial of chemotherapy with Gemcitabine / Abraxane.  We discussed chemoradiation, but was concerned he may not tolerate that well.    - He has received 1 full cycle of Gemzar/Abraxane and is here for cycle 2, day 8. He is tolerating this well without any significant SE. Will proceed with treatment today.   -He will follow up as scheduled for another symptom/toxicity check.     2.  SBBO:    - Continues to take Xifaxan and is tolerating it well. Being followed by Dr. Su.     3.  N/V/Dehydration:  -  Doing much better in this regard.  He denies any n/v today. He is receiving IVF with home health twice weekly. He knows to push oral hydration. Cr 1.30 today. Will give 1L of IVF with treatment today. He has Zofran, Compazine, Ativan, Phenergan and Sancuso patches at home should he need them. He understands how to take these. At present, he  reports using zofran and compazine prn. Will monitor.     4.  Neoplasm related pain:  - Continue Oxycodone PRN, typically once per day.     5.  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  He previously took oral iron but did not absorb it.    - He received Feraheme, completed on 4/3. Will recheck iron panel and ferritin in 4-6 weeks.     6.  Constipation:.  - Currently denies. Continue Senna/Colace and Miralax prn.      7.   History of Atrial fibrillation:  Chronically anticoagulated on Eliquis.      8. Gout of right great toe:   -He is taking Allopurinol 100 mg daily. He completed course of prednisone taper which was helpful but symptoms have worsened again. Will give another course of prednisone taper. Will continue Allopurinol 100 mg daily. Will monitor.     9.  Prophylaxis:  Has had 2018 influenza vaccine.      10.  ACO Quality measures  - Todd Phillips does not use tobacco products.  -- Todd Phillips received 2018 flu vaccine.  - Current outpatient and discharge medications have been reconciled for the patient.  Reviewed by: VIMAL Rolle      I spent 25 minutes with Todd Phillips today.  More than 50% of the time was spent in counseling / coordination of care for the above problems.    Electronically Signed by: VIMAL Rolle    CC:   MD Miquel Quintana MD Aaron House, MD Michael Simons, MD

## 2019-04-21 ENCOUNTER — HOSPITAL ENCOUNTER (INPATIENT)
Facility: HOSPITAL | Age: 69
LOS: 4 days | Discharge: HOME HEALTH | End: 2019-04-25
Attending: INTERNAL MEDICINE | Admitting: INTERNAL MEDICINE

## 2019-04-21 ENCOUNTER — HOSPITAL ENCOUNTER (EMERGENCY)
Facility: HOSPITAL | Age: 69
Discharge: ANOTHER HOSPITAL | End: 2019-04-21
Attending: EMERGENCY MEDICINE | Admitting: EMERGENCY MEDICINE

## 2019-04-21 ENCOUNTER — APPOINTMENT (OUTPATIENT)
Dept: GENERAL RADIOLOGY | Facility: HOSPITAL | Age: 69
End: 2019-04-21

## 2019-04-21 ENCOUNTER — APPOINTMENT (OUTPATIENT)
Dept: CT IMAGING | Facility: HOSPITAL | Age: 69
End: 2019-04-21

## 2019-04-21 VITALS
TEMPERATURE: 98.8 F | RESPIRATION RATE: 20 BRPM | HEART RATE: 84 BPM | OXYGEN SATURATION: 100 % | WEIGHT: 171 LBS | SYSTOLIC BLOOD PRESSURE: 102 MMHG | DIASTOLIC BLOOD PRESSURE: 64 MMHG | BODY MASS INDEX: 23.16 KG/M2 | HEIGHT: 72 IN

## 2019-04-21 DIAGNOSIS — D70.9 FEBRILE NEUTROPENIA (HCC): Primary | ICD-10-CM

## 2019-04-21 DIAGNOSIS — J18.9 PNEUMONIA OF BOTH LOWER LOBES DUE TO INFECTIOUS ORGANISM: ICD-10-CM

## 2019-04-21 DIAGNOSIS — A41.9 SEPSIS, DUE TO UNSPECIFIED ORGANISM: ICD-10-CM

## 2019-04-21 DIAGNOSIS — R50.81 FEBRILE NEUTROPENIA (HCC): Primary | ICD-10-CM

## 2019-04-21 DIAGNOSIS — C25.0 MALIGNANT NEOPLASM OF HEAD OF PANCREAS (HCC): Primary | ICD-10-CM

## 2019-04-21 DIAGNOSIS — T45.1X5A PANCYTOPENIA DUE TO ANTINEOPLASTIC CHEMOTHERAPY (HCC): ICD-10-CM

## 2019-04-21 DIAGNOSIS — Z85.07 HISTORY OF PANCREATIC CANCER: ICD-10-CM

## 2019-04-21 DIAGNOSIS — D61.810 PANCYTOPENIA DUE TO ANTINEOPLASTIC CHEMOTHERAPY (HCC): ICD-10-CM

## 2019-04-21 PROBLEM — R91.8 PULMONARY INFILTRATE: Status: ACTIVE | Noted: 2019-04-21

## 2019-04-21 LAB
ABO GROUP BLD: NORMAL
ABO GROUP BLD: NORMAL
ALBUMIN SERPL-MCNC: 2.6 G/DL (ref 3.5–5.2)
ALBUMIN SERPL-MCNC: 2.81 G/DL (ref 3.5–5.2)
ALBUMIN/GLOB SERPL: 0.9 G/DL
ALBUMIN/GLOB SERPL: 1 G/DL
ALP SERPL-CCNC: 112 U/L (ref 39–117)
ALP SERPL-CCNC: 117 U/L (ref 39–117)
ALT SERPL W P-5'-P-CCNC: 33 U/L (ref 1–41)
ALT SERPL W P-5'-P-CCNC: 39 U/L (ref 1–41)
AMYLASE SERPL-CCNC: 23 U/L (ref 28–100)
ANION GAP SERPL CALCULATED.3IONS-SCNC: 10 MMOL/L
ANION GAP SERPL CALCULATED.3IONS-SCNC: 12.7 MMOL/L
ANISOCYTOSIS BLD QL: ABNORMAL
AST SERPL-CCNC: 23 U/L (ref 1–40)
AST SERPL-CCNC: 27 U/L (ref 1–40)
BASOPHILS # BLD AUTO: 0.01 10*3/MM3 (ref 0–0.2)
BASOPHILS NFR BLD AUTO: 0.6 % (ref 0–1.5)
BILIRUB SERPL-MCNC: 0.3 MG/DL (ref 0.2–1.2)
BILIRUB SERPL-MCNC: 0.7 MG/DL (ref 0.2–1.2)
BILIRUB UR QL STRIP: NEGATIVE
BLD GP AB SCN SERPL QL: NEGATIVE
BLD GP AB SCN SERPL QL: NEGATIVE
BUN BLD-MCNC: 13 MG/DL (ref 8–23)
BUN BLD-MCNC: 17 MG/DL (ref 8–23)
BUN/CREAT SERPL: 13.1 (ref 7–25)
BUN/CREAT SERPL: 15 (ref 7–25)
CA-I SERPL ISE-MCNC: 1.26 MMOL/L (ref 1.12–1.32)
CALCIUM SPEC-SCNC: 8.1 MG/DL (ref 8.6–10.5)
CALCIUM SPEC-SCNC: 8.7 MG/DL (ref 8.6–10.5)
CHLORIDE SERPL-SCNC: 105 MMOL/L (ref 98–107)
CHLORIDE SERPL-SCNC: 105 MMOL/L (ref 98–107)
CLARITY UR: CLEAR
CO2 SERPL-SCNC: 19.3 MMOL/L (ref 22–29)
CO2 SERPL-SCNC: 20 MMOL/L (ref 22–29)
COLOR UR: YELLOW
CREAT BLD-MCNC: 0.99 MG/DL (ref 0.76–1.27)
CREAT BLD-MCNC: 1.13 MG/DL (ref 0.76–1.27)
CRP SERPL-MCNC: 1.6 MG/DL (ref 0–0.5)
D-LACTATE SERPL-SCNC: 1.3 MMOL/L (ref 0.5–2)
D-LACTATE SERPL-SCNC: 1.6 MMOL/L (ref 0.5–2)
DEPRECATED RDW RBC AUTO: 67.1 FL (ref 37–54)
DEPRECATED RDW RBC AUTO: 67.2 FL (ref 37–54)
ELLIPTOCYTES BLD QL SMEAR: ABNORMAL
EOSINOPHIL # BLD AUTO: 0.01 10*3/MM3 (ref 0–0.4)
EOSINOPHIL NFR BLD AUTO: 0.6 % (ref 0.3–6.2)
ERYTHROCYTE [DISTWIDTH] IN BLOOD BY AUTOMATED COUNT: 21.8 % (ref 12.3–15.4)
ERYTHROCYTE [DISTWIDTH] IN BLOOD BY AUTOMATED COUNT: 22.3 % (ref 12.3–15.4)
FLUAV AG NPH QL: NEGATIVE
FLUBV AG NPH QL IA: NEGATIVE
GFR SERPL CREATININE-BSD FRML MDRD: 64 ML/MIN/1.73
GFR SERPL CREATININE-BSD FRML MDRD: 75 ML/MIN/1.73
GLOBULIN UR ELPH-MCNC: 2.8 GM/DL
GLOBULIN UR ELPH-MCNC: 3 GM/DL
GLUCOSE BLD-MCNC: 148 MG/DL (ref 65–99)
GLUCOSE BLD-MCNC: 195 MG/DL (ref 65–99)
GLUCOSE BLDC GLUCOMTR-MCNC: 130 MG/DL (ref 70–130)
GLUCOSE BLDC GLUCOMTR-MCNC: 155 MG/DL (ref 70–130)
GLUCOSE UR STRIP-MCNC: NEGATIVE MG/DL
HBA1C MFR BLD: 5.8 % (ref 4.8–5.6)
HCT VFR BLD AUTO: 20.6 % (ref 37.5–51)
HCT VFR BLD AUTO: 22.6 % (ref 37.5–51)
HGB BLD-MCNC: 6.5 G/DL (ref 13–17.7)
HGB BLD-MCNC: 7.3 G/DL (ref 13–17.7)
HGB UR QL STRIP.AUTO: NEGATIVE
HOLD SPECIMEN: NORMAL
HOLD SPECIMEN: NORMAL
HYPOCHROMIA BLD QL: ABNORMAL
IMM GRANULOCYTES # BLD AUTO: 0.01 10*3/MM3 (ref 0–0.05)
IMM GRANULOCYTES NFR BLD AUTO: 0.6 % (ref 0–0.5)
INR PPP: 1.24 (ref 0.85–1.16)
INR PPP: 1.24 (ref 0.9–1.1)
KETONES UR QL STRIP: ABNORMAL
LEUKOCYTE ESTERASE UR QL STRIP.AUTO: NEGATIVE
LIPASE SERPL-CCNC: 6 U/L (ref 13–60)
LYMPHOCYTES # BLD AUTO: 0.86 10*3/MM3 (ref 0.7–3.1)
LYMPHOCYTES # BLD MANUAL: 0.69 10*3/MM3 (ref 0.7–3.1)
LYMPHOCYTES NFR BLD AUTO: 48.9 % (ref 19.6–45.3)
LYMPHOCYTES NFR BLD MANUAL: 3 % (ref 5–12)
LYMPHOCYTES NFR BLD MANUAL: 56 % (ref 19.6–45.3)
MAGNESIUM SERPL-MCNC: 1.3 MG/DL (ref 1.6–2.4)
MAGNESIUM SERPL-MCNC: 1.7 MG/DL (ref 1.6–2.4)
MCH RBC QN AUTO: 27.3 PG (ref 26.6–33)
MCH RBC QN AUTO: 27.3 PG (ref 26.6–33)
MCHC RBC AUTO-ENTMCNC: 31.6 G/DL (ref 31.5–35.7)
MCHC RBC AUTO-ENTMCNC: 32.3 G/DL (ref 31.5–35.7)
MCV RBC AUTO: 84.6 FL (ref 79–97)
MCV RBC AUTO: 86.6 FL (ref 79–97)
MONOCYTES # BLD AUTO: 0.04 10*3/MM3 (ref 0.1–0.9)
MONOCYTES # BLD AUTO: 0.11 10*3/MM3 (ref 0.1–0.9)
MONOCYTES NFR BLD AUTO: 6.3 % (ref 5–12)
NEUTROPHILS # BLD AUTO: 0.51 10*3/MM3 (ref 1.7–7)
NEUTROPHILS # BLD AUTO: 0.77 10*3/MM3 (ref 1.7–7)
NEUTROPHILS NFR BLD AUTO: 43.6 % (ref 42.7–76)
NEUTROPHILS NFR BLD MANUAL: 41 % (ref 42.7–76)
NITRITE UR QL STRIP: NEGATIVE
PH UR STRIP.AUTO: <=5 [PH] (ref 5–8)
PHOSPHATE SERPL-MCNC: 2.5 MG/DL (ref 2.5–4.5)
PHOSPHATE SERPL-MCNC: 2.5 MG/DL (ref 2.5–4.5)
PLATELET # BLD AUTO: 74 10*3/MM3 (ref 140–450)
PLATELET # BLD AUTO: 80 10*3/MM3 (ref 140–450)
PMV BLD AUTO: 9.1 FL (ref 6–12)
PMV BLD AUTO: 9.5 FL (ref 6–12)
POTASSIUM BLD-SCNC: 3.6 MMOL/L (ref 3.5–5.2)
POTASSIUM BLD-SCNC: 3.7 MMOL/L (ref 3.5–5.2)
PROCALCITONIN SERPL-MCNC: 0.27 NG/ML (ref 0.1–0.25)
PROT SERPL-MCNC: 5.6 G/DL (ref 6–8.5)
PROT SERPL-MCNC: 5.6 G/DL (ref 6–8.5)
PROT UR QL STRIP: NEGATIVE
PROTHROMBIN TIME: 15 SECONDS (ref 11.2–14.3)
PROTHROMBIN TIME: 15.9 SECONDS (ref 11–15.4)
RBC # BLD AUTO: 2.38 10*6/MM3 (ref 4.14–5.8)
RBC # BLD AUTO: 2.67 10*6/MM3 (ref 4.14–5.8)
RH BLD: POSITIVE
RH BLD: POSITIVE
SMALL PLATELETS BLD QL SMEAR: ABNORMAL
SODIUM BLD-SCNC: 135 MMOL/L (ref 136–145)
SODIUM BLD-SCNC: 137 MMOL/L (ref 136–145)
SP GR UR STRIP: 1.02 (ref 1–1.03)
T&S EXPIRATION DATE: NORMAL
T&S EXPIRATION DATE: NORMAL
TROPONIN T SERPL-MCNC: <0.01 NG/ML (ref 0–0.03)
TROPONIN T SERPL-MCNC: <0.01 NG/ML (ref 0–0.03)
UROBILINOGEN UR QL STRIP: ABNORMAL
WBC NRBC COR # BLD: 1.24 10*3/MM3 (ref 3.4–10.8)
WBC NRBC COR # BLD: 1.76 10*3/MM3 (ref 3.4–10.8)
WHOLE BLOOD HOLD SPECIMEN: NORMAL
WHOLE BLOOD HOLD SPECIMEN: NORMAL

## 2019-04-21 PROCEDURE — 96367 TX/PROPH/DG ADDL SEQ IV INF: CPT

## 2019-04-21 PROCEDURE — 82330 ASSAY OF CALCIUM: CPT | Performed by: NURSE PRACTITIONER

## 2019-04-21 PROCEDURE — 87804 INFLUENZA ASSAY W/OPTIC: CPT | Performed by: EMERGENCY MEDICINE

## 2019-04-21 PROCEDURE — 93010 ELECTROCARDIOGRAM REPORT: CPT | Performed by: INTERNAL MEDICINE

## 2019-04-21 PROCEDURE — 86900 BLOOD TYPING SEROLOGIC ABO: CPT | Performed by: NURSE PRACTITIONER

## 2019-04-21 PROCEDURE — 83735 ASSAY OF MAGNESIUM: CPT | Performed by: EMERGENCY MEDICINE

## 2019-04-21 PROCEDURE — 93005 ELECTROCARDIOGRAM TRACING: CPT | Performed by: NURSE PRACTITIONER

## 2019-04-21 PROCEDURE — 83605 ASSAY OF LACTIC ACID: CPT | Performed by: NURSE PRACTITIONER

## 2019-04-21 PROCEDURE — 25010000002 MORPHINE PER 10 MG: Performed by: EMERGENCY MEDICINE

## 2019-04-21 PROCEDURE — 86923 COMPATIBILITY TEST ELECTRIC: CPT

## 2019-04-21 PROCEDURE — 87040 BLOOD CULTURE FOR BACTERIA: CPT | Performed by: EMERGENCY MEDICINE

## 2019-04-21 PROCEDURE — 80053 COMPREHEN METABOLIC PANEL: CPT | Performed by: EMERGENCY MEDICINE

## 2019-04-21 PROCEDURE — 81003 URINALYSIS AUTO W/O SCOPE: CPT | Performed by: EMERGENCY MEDICINE

## 2019-04-21 PROCEDURE — 36430 TRANSFUSION BLD/BLD COMPNT: CPT

## 2019-04-21 PROCEDURE — 85610 PROTHROMBIN TIME: CPT | Performed by: EMERGENCY MEDICINE

## 2019-04-21 PROCEDURE — 85007 BL SMEAR W/DIFF WBC COUNT: CPT | Performed by: EMERGENCY MEDICINE

## 2019-04-21 PROCEDURE — 83735 ASSAY OF MAGNESIUM: CPT | Performed by: NURSE PRACTITIONER

## 2019-04-21 PROCEDURE — 82150 ASSAY OF AMYLASE: CPT | Performed by: EMERGENCY MEDICINE

## 2019-04-21 PROCEDURE — 96375 TX/PRO/DX INJ NEW DRUG ADDON: CPT

## 2019-04-21 PROCEDURE — 84484 ASSAY OF TROPONIN QUANT: CPT | Performed by: NURSE PRACTITIONER

## 2019-04-21 PROCEDURE — 84145 PROCALCITONIN (PCT): CPT | Performed by: NURSE PRACTITIONER

## 2019-04-21 PROCEDURE — 96366 THER/PROPH/DIAG IV INF ADDON: CPT

## 2019-04-21 PROCEDURE — 84100 ASSAY OF PHOSPHORUS: CPT | Performed by: NURSE PRACTITIONER

## 2019-04-21 PROCEDURE — 96365 THER/PROPH/DIAG IV INF INIT: CPT

## 2019-04-21 PROCEDURE — 25010000002 MAGNESIUM SULFATE PER 500 MG OF MAGNESIUM: Performed by: NURSE PRACTITIONER

## 2019-04-21 PROCEDURE — 86901 BLOOD TYPING SEROLOGIC RH(D): CPT | Performed by: PHYSICIAN ASSISTANT

## 2019-04-21 PROCEDURE — 85610 PROTHROMBIN TIME: CPT | Performed by: NURSE PRACTITIONER

## 2019-04-21 PROCEDURE — 25010000002 ONDANSETRON PER 1 MG: Performed by: EMERGENCY MEDICINE

## 2019-04-21 PROCEDURE — 36415 COLL VENOUS BLD VENIPUNCTURE: CPT

## 2019-04-21 PROCEDURE — 25010000002 BUTORPHANOL PER 1 MG: Performed by: EMERGENCY MEDICINE

## 2019-04-21 PROCEDURE — 74176 CT ABD & PELVIS W/O CONTRAST: CPT

## 2019-04-21 PROCEDURE — 83605 ASSAY OF LACTIC ACID: CPT | Performed by: EMERGENCY MEDICINE

## 2019-04-21 PROCEDURE — 71045 X-RAY EXAM CHEST 1 VIEW: CPT | Performed by: RADIOLOGY

## 2019-04-21 PROCEDURE — 85025 COMPLETE CBC W/AUTO DIFF WBC: CPT | Performed by: EMERGENCY MEDICINE

## 2019-04-21 PROCEDURE — 80053 COMPREHEN METABOLIC PANEL: CPT | Performed by: NURSE PRACTITIONER

## 2019-04-21 PROCEDURE — 71045 X-RAY EXAM CHEST 1 VIEW: CPT

## 2019-04-21 PROCEDURE — 99285 EMERGENCY DEPT VISIT HI MDM: CPT

## 2019-04-21 PROCEDURE — 83690 ASSAY OF LIPASE: CPT | Performed by: EMERGENCY MEDICINE

## 2019-04-21 PROCEDURE — 25010000002 PIPERACILLIN-TAZOBACTAM: Performed by: NURSE PRACTITIONER

## 2019-04-21 PROCEDURE — 84100 ASSAY OF PHOSPHORUS: CPT | Performed by: EMERGENCY MEDICINE

## 2019-04-21 PROCEDURE — 25010000002 VANCOMYCIN 5 G RECONSTITUTED SOLUTION 5,000 MG VIAL: Performed by: PHYSICIAN ASSISTANT

## 2019-04-21 PROCEDURE — 96376 TX/PRO/DX INJ SAME DRUG ADON: CPT

## 2019-04-21 PROCEDURE — P9040 RBC LEUKOREDUCED IRRADIATED: HCPCS

## 2019-04-21 PROCEDURE — 83036 HEMOGLOBIN GLYCOSYLATED A1C: CPT | Performed by: NURSE PRACTITIONER

## 2019-04-21 PROCEDURE — 82962 GLUCOSE BLOOD TEST: CPT

## 2019-04-21 PROCEDURE — 86140 C-REACTIVE PROTEIN: CPT | Performed by: EMERGENCY MEDICINE

## 2019-04-21 PROCEDURE — 86850 RBC ANTIBODY SCREEN: CPT | Performed by: PHYSICIAN ASSISTANT

## 2019-04-21 PROCEDURE — 25010000002 THIAMINE PER 100 MG: Performed by: NURSE PRACTITIONER

## 2019-04-21 PROCEDURE — 85025 COMPLETE CBC W/AUTO DIFF WBC: CPT | Performed by: NURSE PRACTITIONER

## 2019-04-21 PROCEDURE — 25010000002 PIPERACILLIN SOD-TAZOBACTAM PER 1 G: Performed by: INTERNAL MEDICINE

## 2019-04-21 PROCEDURE — 86900 BLOOD TYPING SEROLOGIC ABO: CPT

## 2019-04-21 PROCEDURE — 99221 1ST HOSP IP/OBS SF/LOW 40: CPT | Performed by: INTERNAL MEDICINE

## 2019-04-21 PROCEDURE — 74176 CT ABD & PELVIS W/O CONTRAST: CPT | Performed by: RADIOLOGY

## 2019-04-21 PROCEDURE — 86850 RBC ANTIBODY SCREEN: CPT | Performed by: NURSE PRACTITIONER

## 2019-04-21 PROCEDURE — 87040 BLOOD CULTURE FOR BACTERIA: CPT | Performed by: NURSE PRACTITIONER

## 2019-04-21 PROCEDURE — 93005 ELECTROCARDIOGRAM TRACING: CPT | Performed by: EMERGENCY MEDICINE

## 2019-04-21 PROCEDURE — 86901 BLOOD TYPING SEROLOGIC RH(D): CPT | Performed by: NURSE PRACTITIONER

## 2019-04-21 PROCEDURE — 86900 BLOOD TYPING SEROLOGIC ABO: CPT | Performed by: PHYSICIAN ASSISTANT

## 2019-04-21 PROCEDURE — 84484 ASSAY OF TROPONIN QUANT: CPT | Performed by: EMERGENCY MEDICINE

## 2019-04-21 RX ORDER — ONDANSETRON 2 MG/ML
4 INJECTION INTRAMUSCULAR; INTRAVENOUS ONCE
Status: COMPLETED | OUTPATIENT
Start: 2019-04-21 | End: 2019-04-21

## 2019-04-21 RX ORDER — METOCLOPRAMIDE 10 MG/1
10 TABLET ORAL
Status: ON HOLD | COMMUNITY
End: 2019-04-22

## 2019-04-21 RX ORDER — TERAZOSIN 2 MG/1
2 CAPSULE ORAL NIGHTLY
Status: ON HOLD | COMMUNITY
End: 2019-04-22

## 2019-04-21 RX ORDER — SODIUM CHLORIDE 0.9 % (FLUSH) 0.9 %
10 SYRINGE (ML) INJECTION AS NEEDED
Status: DISCONTINUED | OUTPATIENT
Start: 2019-04-21 | End: 2019-04-21 | Stop reason: HOSPADM

## 2019-04-21 RX ORDER — POTASSIUM CHLORIDE 1.5 G/1.77G
40 POWDER, FOR SOLUTION ORAL AS NEEDED
Status: DISCONTINUED | OUTPATIENT
Start: 2019-04-21 | End: 2019-04-25 | Stop reason: HOSPADM

## 2019-04-21 RX ORDER — MAGNESIUM SULFATE HEPTAHYDRATE 40 MG/ML
2 INJECTION, SOLUTION INTRAVENOUS AS NEEDED
Status: DISCONTINUED | OUTPATIENT
Start: 2019-04-21 | End: 2019-04-25 | Stop reason: HOSPADM

## 2019-04-21 RX ORDER — FLUOXETINE HYDROCHLORIDE 20 MG/1
20 CAPSULE ORAL DAILY
Status: DISCONTINUED | OUTPATIENT
Start: 2019-04-21 | End: 2019-04-21

## 2019-04-21 RX ORDER — ONDANSETRON 2 MG/ML
4 INJECTION INTRAMUSCULAR; INTRAVENOUS EVERY 6 HOURS PRN
Status: DISCONTINUED | OUTPATIENT
Start: 2019-04-21 | End: 2019-04-25 | Stop reason: HOSPADM

## 2019-04-21 RX ORDER — SODIUM CHLORIDE 0.9 % (FLUSH) 0.9 %
3 SYRINGE (ML) INJECTION EVERY 12 HOURS SCHEDULED
Status: DISCONTINUED | OUTPATIENT
Start: 2019-04-21 | End: 2019-04-25 | Stop reason: HOSPADM

## 2019-04-21 RX ORDER — ALLOPURINOL 100 MG/1
100 TABLET ORAL DAILY
Status: DISCONTINUED | OUTPATIENT
Start: 2019-04-21 | End: 2019-04-21

## 2019-04-21 RX ORDER — IPRATROPIUM BROMIDE AND ALBUTEROL SULFATE 2.5; .5 MG/3ML; MG/3ML
3 SOLUTION RESPIRATORY (INHALATION) EVERY 6 HOURS PRN
Status: DISCONTINUED | OUTPATIENT
Start: 2019-04-21 | End: 2019-04-21

## 2019-04-21 RX ORDER — FAMOTIDINE 10 MG/ML
20 INJECTION, SOLUTION INTRAVENOUS DAILY
Status: DISCONTINUED | OUTPATIENT
Start: 2019-04-21 | End: 2019-04-23

## 2019-04-21 RX ORDER — NICOTINE POLACRILEX 4 MG
15 LOZENGE BUCCAL
Status: DISCONTINUED | OUTPATIENT
Start: 2019-04-21 | End: 2019-04-21

## 2019-04-21 RX ORDER — ALLOPURINOL 100 MG/1
100 TABLET ORAL DAILY
Status: DISCONTINUED | OUTPATIENT
Start: 2019-04-22 | End: 2019-04-25 | Stop reason: HOSPADM

## 2019-04-21 RX ORDER — SODIUM CHLORIDE 0.9 % (FLUSH) 0.9 %
3-10 SYRINGE (ML) INJECTION AS NEEDED
Status: DISCONTINUED | OUTPATIENT
Start: 2019-04-21 | End: 2019-04-25 | Stop reason: HOSPADM

## 2019-04-21 RX ORDER — DOCUSATE SODIUM 100 MG/1
100 CAPSULE, LIQUID FILLED ORAL DAILY
Status: DISCONTINUED | OUTPATIENT
Start: 2019-04-21 | End: 2019-04-25 | Stop reason: HOSPADM

## 2019-04-21 RX ORDER — MAGNESIUM SULFATE HEPTAHYDRATE 40 MG/ML
4 INJECTION, SOLUTION INTRAVENOUS AS NEEDED
Status: DISCONTINUED | OUTPATIENT
Start: 2019-04-21 | End: 2019-04-25 | Stop reason: HOSPADM

## 2019-04-21 RX ORDER — POTASSIUM CHLORIDE 750 MG/1
40 CAPSULE, EXTENDED RELEASE ORAL AS NEEDED
Status: DISCONTINUED | OUTPATIENT
Start: 2019-04-21 | End: 2019-04-25 | Stop reason: HOSPADM

## 2019-04-21 RX ORDER — DEXTROSE MONOHYDRATE 25 G/50ML
25 INJECTION, SOLUTION INTRAVENOUS
Status: DISCONTINUED | OUTPATIENT
Start: 2019-04-21 | End: 2019-04-21

## 2019-04-21 RX ORDER — NICOTINE POLACRILEX 4 MG
15 LOZENGE BUCCAL
Status: DISCONTINUED | OUTPATIENT
Start: 2019-04-21 | End: 2019-04-25 | Stop reason: HOSPADM

## 2019-04-21 RX ORDER — SODIUM CHLORIDE, SODIUM LACTATE, POTASSIUM CHLORIDE, CALCIUM CHLORIDE 600; 310; 30; 20 MG/100ML; MG/100ML; MG/100ML; MG/100ML
100 INJECTION, SOLUTION INTRAVENOUS CONTINUOUS
Status: DISCONTINUED | OUTPATIENT
Start: 2019-04-21 | End: 2019-04-23

## 2019-04-21 RX ORDER — DEXTROSE MONOHYDRATE 25 G/50ML
25 INJECTION, SOLUTION INTRAVENOUS
Status: DISCONTINUED | OUTPATIENT
Start: 2019-04-21 | End: 2019-04-25 | Stop reason: HOSPADM

## 2019-04-21 RX ORDER — SODIUM CHLORIDE 9 MG/ML
INJECTION, SOLUTION INTRAVENOUS
Status: COMPLETED
Start: 2019-04-21 | End: 2019-04-21

## 2019-04-21 RX ORDER — IPRATROPIUM BROMIDE AND ALBUTEROL SULFATE 2.5; .5 MG/3ML; MG/3ML
3 SOLUTION RESPIRATORY (INHALATION) EVERY 4 HOURS PRN
Status: DISCONTINUED | OUTPATIENT
Start: 2019-04-21 | End: 2019-04-25 | Stop reason: HOSPADM

## 2019-04-21 RX ORDER — FLUOXETINE HYDROCHLORIDE 20 MG/1
20 CAPSULE ORAL DAILY
Status: DISCONTINUED | OUTPATIENT
Start: 2019-04-22 | End: 2019-04-25 | Stop reason: HOSPADM

## 2019-04-21 RX ORDER — FAMOTIDINE 10 MG/ML
20 INJECTION, SOLUTION INTRAVENOUS EVERY 12 HOURS SCHEDULED
Status: DISCONTINUED | OUTPATIENT
Start: 2019-04-21 | End: 2019-04-21

## 2019-04-21 RX ADMIN — THIAMINE HYDROCHLORIDE 1000 ML/HR: 100 INJECTION, SOLUTION INTRAMUSCULAR; INTRAVENOUS at 05:46

## 2019-04-21 RX ADMIN — SODIUM CHLORIDE: 9 INJECTION, SOLUTION INTRAVENOUS at 10:30

## 2019-04-21 RX ADMIN — MAGNESIUM SULFATE HEPTAHYDRATE 4 G: 40 INJECTION, SOLUTION INTRAVENOUS at 22:53

## 2019-04-21 RX ADMIN — PIPERACILLIN SODIUM,TAZOBACTAM SODIUM 3.38 G: 3; .375 INJECTION, POWDER, FOR SOLUTION INTRAVENOUS at 07:50

## 2019-04-21 RX ADMIN — TAZOBACTAM SODIUM AND PIPERACILLIN SODIUM 4.5 G: 500; 4 INJECTION, SOLUTION INTRAVENOUS at 18:16

## 2019-04-21 RX ADMIN — ONDANSETRON 4 MG: 2 INJECTION, SOLUTION INTRAMUSCULAR; INTRAVENOUS at 04:23

## 2019-04-21 RX ADMIN — BUTORPHANOL TARTRATE 1 MG: 2 INJECTION, SOLUTION INTRAMUSCULAR; INTRAVENOUS at 04:52

## 2019-04-21 RX ADMIN — POTASSIUM CHLORIDE 40 MEQ: 750 CAPSULE, EXTENDED RELEASE ORAL at 22:52

## 2019-04-21 RX ADMIN — SODIUM CHLORIDE, PRESERVATIVE FREE 3 ML: 5 INJECTION INTRAVENOUS at 21:11

## 2019-04-21 RX ADMIN — DOCUSATE SODIUM 100 MG: 100 CAPSULE, LIQUID FILLED ORAL at 18:16

## 2019-04-21 RX ADMIN — VANCOMYCIN HYDROCHLORIDE 1500 MG: 5 INJECTION, POWDER, LYOPHILIZED, FOR SOLUTION INTRAVENOUS at 08:40

## 2019-04-21 RX ADMIN — FAMOTIDINE 20 MG: 10 INJECTION, SOLUTION INTRAVENOUS at 18:16

## 2019-04-21 RX ADMIN — METRONIDAZOLE 500 MG: 500 INJECTION, SOLUTION INTRAVENOUS at 13:37

## 2019-04-21 RX ADMIN — MORPHINE SULFATE 4 MG: 4 INJECTION INTRAVENOUS at 04:23

## 2019-04-21 RX ADMIN — SODIUM CHLORIDE 1000 ML: 9 INJECTION, SOLUTION INTRAVENOUS at 04:22

## 2019-04-21 RX ADMIN — MORPHINE SULFATE 4 MG: 4 INJECTION INTRAVENOUS at 12:30

## 2019-04-21 RX ADMIN — SODIUM CHLORIDE, POTASSIUM CHLORIDE, SODIUM LACTATE AND CALCIUM CHLORIDE 100 ML/HR: 600; 310; 30; 20 INJECTION, SOLUTION INTRAVENOUS at 18:48

## 2019-04-22 ENCOUNTER — APPOINTMENT (OUTPATIENT)
Dept: CT IMAGING | Facility: HOSPITAL | Age: 69
End: 2019-04-22

## 2019-04-22 ENCOUNTER — APPOINTMENT (OUTPATIENT)
Dept: GENERAL RADIOLOGY | Facility: HOSPITAL | Age: 69
End: 2019-04-22

## 2019-04-22 LAB
ALBUMIN SERPL-MCNC: 2.6 G/DL (ref 3.5–5.2)
ALBUMIN/GLOB SERPL: 0.8 G/DL
ALP SERPL-CCNC: 113 U/L (ref 39–117)
ALT SERPL W P-5'-P-CCNC: 32 U/L (ref 1–41)
ANION GAP SERPL CALCULATED.3IONS-SCNC: 12 MMOL/L
AST SERPL-CCNC: 20 U/L (ref 1–40)
BASOPHILS # BLD AUTO: 0 10*3/MM3 (ref 0–0.2)
BASOPHILS NFR BLD AUTO: 0 % (ref 0–1.5)
BILIRUB SERPL-MCNC: 0.7 MG/DL (ref 0.2–1.2)
BILIRUB UR QL STRIP: NEGATIVE
BUN BLD-MCNC: 11 MG/DL (ref 8–23)
BUN/CREAT SERPL: 11.8 (ref 7–25)
CA-I SERPL ISE-MCNC: 1.24 MMOL/L (ref 1.12–1.32)
CALCIUM SPEC-SCNC: 8.6 MG/DL (ref 8.6–10.5)
CHLORIDE SERPL-SCNC: 106 MMOL/L (ref 98–107)
CLARITY UR: CLEAR
CO2 SERPL-SCNC: 18 MMOL/L (ref 22–29)
COLOR UR: YELLOW
CREAT BLD-MCNC: 0.93 MG/DL (ref 0.76–1.27)
DEPRECATED RDW RBC AUTO: 68.6 FL (ref 37–54)
EOSINOPHIL # BLD AUTO: 0.03 10*3/MM3 (ref 0–0.4)
EOSINOPHIL NFR BLD AUTO: 1.4 % (ref 0.3–6.2)
ERYTHROCYTE [DISTWIDTH] IN BLOOD BY AUTOMATED COUNT: 22 % (ref 12.3–15.4)
GFR SERPL CREATININE-BSD FRML MDRD: 80 ML/MIN/1.73
GLOBULIN UR ELPH-MCNC: 3.2 GM/DL
GLUCOSE BLD-MCNC: 127 MG/DL (ref 65–99)
GLUCOSE BLDC GLUCOMTR-MCNC: 117 MG/DL (ref 70–130)
GLUCOSE BLDC GLUCOMTR-MCNC: 121 MG/DL (ref 70–130)
GLUCOSE BLDC GLUCOMTR-MCNC: 122 MG/DL (ref 70–130)
GLUCOSE BLDC GLUCOMTR-MCNC: 140 MG/DL (ref 70–130)
GLUCOSE UR STRIP-MCNC: NEGATIVE MG/DL
HCT VFR BLD AUTO: 23.8 % (ref 37.5–51)
HGB BLD-MCNC: 7.7 G/DL (ref 13–17.7)
HGB UR QL STRIP.AUTO: NEGATIVE
IMM GRANULOCYTES # BLD AUTO: 0.01 10*3/MM3 (ref 0–0.05)
IMM GRANULOCYTES NFR BLD AUTO: 0.5 % (ref 0–0.5)
KETONES UR QL STRIP: NEGATIVE
LEUKOCYTE ESTERASE UR QL STRIP.AUTO: NEGATIVE
LYMPHOCYTES # BLD AUTO: 0.82 10*3/MM3 (ref 0.7–3.1)
LYMPHOCYTES NFR BLD AUTO: 38.7 % (ref 19.6–45.3)
MAGNESIUM SERPL-MCNC: 2.2 MG/DL (ref 1.6–2.4)
MCH RBC QN AUTO: 27.5 PG (ref 26.6–33)
MCHC RBC AUTO-ENTMCNC: 32.4 G/DL (ref 31.5–35.7)
MCV RBC AUTO: 85 FL (ref 79–97)
MONOCYTES # BLD AUTO: 0.13 10*3/MM3 (ref 0.1–0.9)
MONOCYTES NFR BLD AUTO: 6.1 % (ref 5–12)
NEUTROPHILS # BLD AUTO: 1.14 10*3/MM3 (ref 1.7–7)
NEUTROPHILS NFR BLD AUTO: 53.8 % (ref 42.7–76)
NITRITE UR QL STRIP: NEGATIVE
PH UR STRIP.AUTO: <=5 [PH] (ref 5–8)
PHOSPHATE SERPL-MCNC: 2.9 MG/DL (ref 2.5–4.5)
PLATELET # BLD AUTO: 74 10*3/MM3 (ref 140–450)
PMV BLD AUTO: 10 FL (ref 6–12)
POTASSIUM BLD-SCNC: 4.5 MMOL/L (ref 3.5–5.2)
PROT SERPL-MCNC: 5.8 G/DL (ref 6–8.5)
PROT UR QL STRIP: NEGATIVE
RBC # BLD AUTO: 2.8 10*6/MM3 (ref 4.14–5.8)
SODIUM BLD-SCNC: 136 MMOL/L (ref 136–145)
SP GR UR STRIP: 1.02 (ref 1–1.03)
UROBILINOGEN UR QL STRIP: NORMAL
WBC NRBC COR # BLD: 2.12 10*3/MM3 (ref 3.4–10.8)

## 2019-04-22 PROCEDURE — 93010 ELECTROCARDIOGRAM REPORT: CPT | Performed by: INTERNAL MEDICINE

## 2019-04-22 PROCEDURE — 87086 URINE CULTURE/COLONY COUNT: CPT | Performed by: NURSE PRACTITIONER

## 2019-04-22 PROCEDURE — 25010000002 METOCLOPRAMIDE PER 10 MG: Performed by: SURGERY

## 2019-04-22 PROCEDURE — 85025 COMPLETE CBC W/AUTO DIFF WBC: CPT | Performed by: NURSE PRACTITIONER

## 2019-04-22 PROCEDURE — 74176 CT ABD & PELVIS W/O CONTRAST: CPT

## 2019-04-22 PROCEDURE — 84100 ASSAY OF PHOSPHORUS: CPT | Performed by: NURSE PRACTITIONER

## 2019-04-22 PROCEDURE — 25010000002 VANCOMYCIN 10 G RECONSTITUTED SOLUTION

## 2019-04-22 PROCEDURE — 63710000001 PROMETHAZINE PER 12.5 MG: Performed by: NURSE PRACTITIONER

## 2019-04-22 PROCEDURE — 99222 1ST HOSP IP/OBS MODERATE 55: CPT | Performed by: INTERNAL MEDICINE

## 2019-04-22 PROCEDURE — 82330 ASSAY OF CALCIUM: CPT | Performed by: NURSE PRACTITIONER

## 2019-04-22 PROCEDURE — 25010000002 PIPERACILLIN SOD-TAZOBACTAM PER 1 G: Performed by: INTERNAL MEDICINE

## 2019-04-22 PROCEDURE — 81003 URINALYSIS AUTO W/O SCOPE: CPT | Performed by: NURSE PRACTITIONER

## 2019-04-22 PROCEDURE — 80053 COMPREHEN METABOLIC PANEL: CPT | Performed by: NURSE PRACTITIONER

## 2019-04-22 PROCEDURE — 71045 X-RAY EXAM CHEST 1 VIEW: CPT

## 2019-04-22 PROCEDURE — 99232 SBSQ HOSP IP/OBS MODERATE 35: CPT | Performed by: INTERNAL MEDICINE

## 2019-04-22 PROCEDURE — 83735 ASSAY OF MAGNESIUM: CPT | Performed by: INTERNAL MEDICINE

## 2019-04-22 PROCEDURE — 82962 GLUCOSE BLOOD TEST: CPT

## 2019-04-22 PROCEDURE — 93005 ELECTROCARDIOGRAM TRACING: CPT | Performed by: INTERNAL MEDICINE

## 2019-04-22 PROCEDURE — 25010000002 ONDANSETRON PER 1 MG: Performed by: INTERNAL MEDICINE

## 2019-04-22 RX ORDER — METOCLOPRAMIDE 10 MG/1
10 TABLET ORAL
Status: DISCONTINUED | OUTPATIENT
Start: 2019-04-22 | End: 2019-04-25 | Stop reason: HOSPADM

## 2019-04-22 RX ORDER — PROMETHAZINE HYDROCHLORIDE 12.5 MG/1
12.5 TABLET ORAL EVERY 6 HOURS PRN
Status: DISCONTINUED | OUTPATIENT
Start: 2019-04-22 | End: 2019-04-25 | Stop reason: HOSPADM

## 2019-04-22 RX ORDER — METOCLOPRAMIDE HYDROCHLORIDE 5 MG/ML
10 INJECTION INTRAMUSCULAR; INTRAVENOUS EVERY 6 HOURS
Status: DISCONTINUED | OUTPATIENT
Start: 2019-04-22 | End: 2019-04-22

## 2019-04-22 RX ORDER — FLECAINIDE ACETATE 50 MG/1
50 TABLET ORAL EVERY 12 HOURS
COMMUNITY

## 2019-04-22 RX ORDER — PROCHLORPERAZINE MALEATE 10 MG
10 TABLET ORAL EVERY 6 HOURS PRN
Status: DISCONTINUED | OUTPATIENT
Start: 2019-04-22 | End: 2019-04-25 | Stop reason: HOSPADM

## 2019-04-22 RX ADMIN — TAZOBACTAM SODIUM AND PIPERACILLIN SODIUM 4.5 G: 500; 4 INJECTION, SOLUTION INTRAVENOUS at 02:54

## 2019-04-22 RX ADMIN — PANCRELIPASE 12000 UNITS OF LIPASE: 30000; 6000; 19000 CAPSULE, DELAYED RELEASE PELLETS ORAL at 11:38

## 2019-04-22 RX ADMIN — PROMETHAZINE HYDROCHLORIDE 12.5 MG: 12.5 TABLET ORAL at 13:52

## 2019-04-22 RX ADMIN — SODIUM CHLORIDE, PRESERVATIVE FREE 3 ML: 5 INJECTION INTRAVENOUS at 20:30

## 2019-04-22 RX ADMIN — DOCUSATE SODIUM 100 MG: 100 CAPSULE, LIQUID FILLED ORAL at 08:05

## 2019-04-22 RX ADMIN — METOCLOPRAMIDE 10 MG: 5 INJECTION, SOLUTION INTRAMUSCULAR; INTRAVENOUS at 12:00

## 2019-04-22 RX ADMIN — TAZOBACTAM SODIUM AND PIPERACILLIN SODIUM 4.5 G: 500; 4 INJECTION, SOLUTION INTRAVENOUS at 17:47

## 2019-04-22 RX ADMIN — POLYETHYLENE GLYCOL 3350 17 G: 17 POWDER, FOR SOLUTION ORAL at 08:06

## 2019-04-22 RX ADMIN — FLUOXETINE HYDROCHLORIDE 20 MG: 20 CAPSULE ORAL at 08:05

## 2019-04-22 RX ADMIN — FAMOTIDINE 20 MG: 10 INJECTION, SOLUTION INTRAVENOUS at 08:06

## 2019-04-22 RX ADMIN — TAZOBACTAM SODIUM AND PIPERACILLIN SODIUM 4.5 G: 500; 4 INJECTION, SOLUTION INTRAVENOUS at 08:05

## 2019-04-22 RX ADMIN — ONDANSETRON 4 MG: 2 INJECTION INTRAMUSCULAR; INTRAVENOUS at 18:03

## 2019-04-22 RX ADMIN — ONDANSETRON 4 MG: 2 INJECTION INTRAMUSCULAR; INTRAVENOUS at 11:38

## 2019-04-22 RX ADMIN — METOCLOPRAMIDE 10 MG: 10 TABLET ORAL at 17:47

## 2019-04-22 RX ADMIN — SODIUM CHLORIDE, POTASSIUM CHLORIDE, SODIUM LACTATE AND CALCIUM CHLORIDE 100 ML/HR: 600; 310; 30; 20 INJECTION, SOLUTION INTRAVENOUS at 00:41

## 2019-04-22 RX ADMIN — ALLOPURINOL 100 MG: 100 TABLET ORAL at 08:05

## 2019-04-22 RX ADMIN — POTASSIUM CHLORIDE 40 MEQ: 750 CAPSULE, EXTENDED RELEASE ORAL at 02:54

## 2019-04-22 RX ADMIN — PANCRELIPASE 12000 UNITS OF LIPASE: 30000; 6000; 19000 CAPSULE, DELAYED RELEASE PELLETS ORAL at 17:47

## 2019-04-22 RX ADMIN — METOCLOPRAMIDE 10 MG: 10 TABLET ORAL at 20:25

## 2019-04-22 RX ADMIN — VANCOMYCIN HYDROCHLORIDE 1250 MG: 10 INJECTION, POWDER, LYOPHILIZED, FOR SOLUTION INTRAVENOUS at 08:05

## 2019-04-22 RX ADMIN — SODIUM CHLORIDE, POTASSIUM CHLORIDE, SODIUM LACTATE AND CALCIUM CHLORIDE 100 ML/HR: 600; 310; 30; 20 INJECTION, SOLUTION INTRAVENOUS at 20:26

## 2019-04-23 ENCOUNTER — APPOINTMENT (OUTPATIENT)
Dept: ONCOLOGY | Facility: HOSPITAL | Age: 69
End: 2019-04-23
Attending: INTERNAL MEDICINE

## 2019-04-23 LAB
ANION GAP SERPL CALCULATED.3IONS-SCNC: 10 MMOL/L
BACTERIA SPEC AEROBE CULT: NO GROWTH
BASOPHILS # BLD AUTO: 0.01 10*3/MM3 (ref 0–0.2)
BASOPHILS NFR BLD AUTO: 0.4 % (ref 0–1.5)
BUN BLD-MCNC: 9 MG/DL (ref 8–23)
BUN/CREAT SERPL: 7.8 (ref 7–25)
CALCIUM SPEC-SCNC: 8.5 MG/DL (ref 8.6–10.5)
CANCER AG19-9 SERPL-ACNC: 76.1 U/ML
CHLORIDE SERPL-SCNC: 109 MMOL/L (ref 98–107)
CO2 SERPL-SCNC: 24 MMOL/L (ref 22–29)
CREAT BLD-MCNC: 1.16 MG/DL (ref 0.76–1.27)
DEPRECATED RDW RBC AUTO: 68.2 FL (ref 37–54)
EOSINOPHIL # BLD AUTO: 0.07 10*3/MM3 (ref 0–0.4)
EOSINOPHIL NFR BLD AUTO: 3.1 % (ref 0.3–6.2)
ERYTHROCYTE [DISTWIDTH] IN BLOOD BY AUTOMATED COUNT: 22 % (ref 12.3–15.4)
GFR SERPL CREATININE-BSD FRML MDRD: 62 ML/MIN/1.73
GLUCOSE BLD-MCNC: 119 MG/DL (ref 65–99)
GLUCOSE BLDC GLUCOMTR-MCNC: 117 MG/DL (ref 70–130)
GLUCOSE BLDC GLUCOMTR-MCNC: 145 MG/DL (ref 70–130)
GLUCOSE BLDC GLUCOMTR-MCNC: 167 MG/DL (ref 70–130)
GLUCOSE BLDC GLUCOMTR-MCNC: 182 MG/DL (ref 70–130)
HCT VFR BLD AUTO: 25.6 % (ref 37.5–51)
HGB BLD-MCNC: 8.1 G/DL (ref 13–17.7)
IMM GRANULOCYTES # BLD AUTO: 0 10*3/MM3 (ref 0–0.05)
IMM GRANULOCYTES NFR BLD AUTO: 0 % (ref 0–0.5)
LYMPHOCYTES # BLD AUTO: 1.18 10*3/MM3 (ref 0.7–3.1)
LYMPHOCYTES NFR BLD AUTO: 52 % (ref 19.6–45.3)
MAGNESIUM SERPL-MCNC: 1.9 MG/DL (ref 1.6–2.4)
MCH RBC QN AUTO: 26.9 PG (ref 26.6–33)
MCHC RBC AUTO-ENTMCNC: 31.6 G/DL (ref 31.5–35.7)
MCV RBC AUTO: 85 FL (ref 79–97)
MONOCYTES # BLD AUTO: 0.22 10*3/MM3 (ref 0.1–0.9)
MONOCYTES NFR BLD AUTO: 9.7 % (ref 5–12)
NEUTROPHILS # BLD AUTO: 0.79 10*3/MM3 (ref 1.7–7)
NEUTROPHILS NFR BLD AUTO: 34.8 % (ref 42.7–76)
OVALOCYTES BLD QL SMEAR: NORMAL
PHOSPHATE SERPL-MCNC: 3.6 MG/DL (ref 2.5–4.5)
PLAT MORPH BLD: NORMAL
PLATELET # BLD AUTO: 87 10*3/MM3 (ref 140–450)
PMV BLD AUTO: 10.6 FL (ref 6–12)
POTASSIUM BLD-SCNC: 4.2 MMOL/L (ref 3.5–5.2)
RBC # BLD AUTO: 3.01 10*6/MM3 (ref 4.14–5.8)
SCHISTOCYTES BLD QL SMEAR: NORMAL
SODIUM BLD-SCNC: 143 MMOL/L (ref 136–145)
WBC MORPH BLD: NORMAL
WBC NRBC COR # BLD: 2.27 10*3/MM3 (ref 3.4–10.8)

## 2019-04-23 PROCEDURE — 85007 BL SMEAR W/DIFF WBC COUNT: CPT | Performed by: INTERNAL MEDICINE

## 2019-04-23 PROCEDURE — 82962 GLUCOSE BLOOD TEST: CPT

## 2019-04-23 PROCEDURE — 25010000002 ONDANSETRON PER 1 MG: Performed by: INTERNAL MEDICINE

## 2019-04-23 PROCEDURE — 99232 SBSQ HOSP IP/OBS MODERATE 35: CPT | Performed by: INTERNAL MEDICINE

## 2019-04-23 PROCEDURE — 84100 ASSAY OF PHOSPHORUS: CPT | Performed by: INTERNAL MEDICINE

## 2019-04-23 PROCEDURE — 25010000002 PIPERACILLIN SOD-TAZOBACTAM PER 1 G: Performed by: INTERNAL MEDICINE

## 2019-04-23 PROCEDURE — 83735 ASSAY OF MAGNESIUM: CPT | Performed by: INTERNAL MEDICINE

## 2019-04-23 PROCEDURE — 85025 COMPLETE CBC W/AUTO DIFF WBC: CPT | Performed by: INTERNAL MEDICINE

## 2019-04-23 PROCEDURE — 80048 BASIC METABOLIC PNL TOTAL CA: CPT | Performed by: INTERNAL MEDICINE

## 2019-04-23 PROCEDURE — 86301 IMMUNOASSAY TUMOR CA 19-9: CPT | Performed by: INTERNAL MEDICINE

## 2019-04-23 PROCEDURE — 63710000001 INSULIN LISPRO (HUMAN) PER 5 UNITS: Performed by: INTERNAL MEDICINE

## 2019-04-23 RX ORDER — FLECAINIDE ACETATE 50 MG/1
50 TABLET ORAL EVERY 12 HOURS SCHEDULED
Status: DISCONTINUED | OUTPATIENT
Start: 2019-04-23 | End: 2019-04-25 | Stop reason: HOSPADM

## 2019-04-23 RX ORDER — PANTOPRAZOLE SODIUM 40 MG/1
40 TABLET, DELAYED RELEASE ORAL
Status: DISCONTINUED | OUTPATIENT
Start: 2019-04-23 | End: 2019-04-25 | Stop reason: HOSPADM

## 2019-04-23 RX ADMIN — METOCLOPRAMIDE 10 MG: 10 TABLET ORAL at 20:54

## 2019-04-23 RX ADMIN — METOCLOPRAMIDE 10 MG: 10 TABLET ORAL at 08:48

## 2019-04-23 RX ADMIN — METOCLOPRAMIDE 10 MG: 10 TABLET ORAL at 12:39

## 2019-04-23 RX ADMIN — APIXABAN 5 MG: 5 TABLET, FILM COATED ORAL at 10:16

## 2019-04-23 RX ADMIN — FLUOXETINE HYDROCHLORIDE 20 MG: 20 CAPSULE ORAL at 08:52

## 2019-04-23 RX ADMIN — FLECAINIDE ACETATE 50 MG: 50 TABLET ORAL at 10:16

## 2019-04-23 RX ADMIN — SODIUM CHLORIDE, POTASSIUM CHLORIDE, SODIUM LACTATE AND CALCIUM CHLORIDE 100 ML/HR: 600; 310; 30; 20 INJECTION, SOLUTION INTRAVENOUS at 01:24

## 2019-04-23 RX ADMIN — PANCRELIPASE 12000 UNITS OF LIPASE: 30000; 6000; 19000 CAPSULE, DELAYED RELEASE PELLETS ORAL at 18:18

## 2019-04-23 RX ADMIN — METOCLOPRAMIDE 10 MG: 10 TABLET ORAL at 18:18

## 2019-04-23 RX ADMIN — TAZOBACTAM SODIUM AND PIPERACILLIN SODIUM 4.5 G: 500; 4 INJECTION, SOLUTION INTRAVENOUS at 18:18

## 2019-04-23 RX ADMIN — TAZOBACTAM SODIUM AND PIPERACILLIN SODIUM 4.5 G: 500; 4 INJECTION, SOLUTION INTRAVENOUS at 10:44

## 2019-04-23 RX ADMIN — PANCRELIPASE 12000 UNITS OF LIPASE: 30000; 6000; 19000 CAPSULE, DELAYED RELEASE PELLETS ORAL at 08:48

## 2019-04-23 RX ADMIN — ALLOPURINOL 100 MG: 100 TABLET ORAL at 08:48

## 2019-04-23 RX ADMIN — PANTOPRAZOLE SODIUM 40 MG: 40 TABLET, DELAYED RELEASE ORAL at 10:16

## 2019-04-23 RX ADMIN — ONDANSETRON 4 MG: 2 INJECTION INTRAMUSCULAR; INTRAVENOUS at 18:18

## 2019-04-23 RX ADMIN — PANCRELIPASE 12000 UNITS OF LIPASE: 30000; 6000; 19000 CAPSULE, DELAYED RELEASE PELLETS ORAL at 13:13

## 2019-04-23 RX ADMIN — INSULIN LISPRO 2 UNITS: 100 INJECTION, SOLUTION INTRAVENOUS; SUBCUTANEOUS at 18:19

## 2019-04-23 RX ADMIN — TAZOBACTAM SODIUM AND PIPERACILLIN SODIUM 4.5 G: 500; 4 INJECTION, SOLUTION INTRAVENOUS at 01:24

## 2019-04-23 RX ADMIN — FLECAINIDE ACETATE 50 MG: 50 TABLET ORAL at 20:54

## 2019-04-23 RX ADMIN — FAMOTIDINE 20 MG: 10 INJECTION, SOLUTION INTRAVENOUS at 08:48

## 2019-04-23 RX ADMIN — APIXABAN 5 MG: 5 TABLET, FILM COATED ORAL at 20:54

## 2019-04-23 RX ADMIN — MAGNESIUM SULFATE HEPTAHYDRATE 4 G: 40 INJECTION, SOLUTION INTRAVENOUS at 10:43

## 2019-04-24 PROBLEM — K52.9 ENTERITIS: Status: ACTIVE | Noted: 2019-04-24

## 2019-04-24 LAB
ANION GAP SERPL CALCULATED.3IONS-SCNC: 10 MMOL/L
BUN BLD-MCNC: 8 MG/DL (ref 8–23)
BUN/CREAT SERPL: 7 (ref 7–25)
CALCIUM SPEC-SCNC: 8.2 MG/DL (ref 8.6–10.5)
CHLORIDE SERPL-SCNC: 108 MMOL/L (ref 98–107)
CO2 SERPL-SCNC: 22 MMOL/L (ref 22–29)
CREAT BLD-MCNC: 1.14 MG/DL (ref 0.76–1.27)
DEPRECATED RDW RBC AUTO: 67.6 FL (ref 37–54)
ERYTHROCYTE [DISTWIDTH] IN BLOOD BY AUTOMATED COUNT: 21.8 % (ref 12.3–15.4)
GFR SERPL CREATININE-BSD FRML MDRD: 64 ML/MIN/1.73
GLUCOSE BLD-MCNC: 131 MG/DL (ref 65–99)
GLUCOSE BLDC GLUCOMTR-MCNC: 120 MG/DL (ref 70–130)
GLUCOSE BLDC GLUCOMTR-MCNC: 132 MG/DL (ref 70–130)
GLUCOSE BLDC GLUCOMTR-MCNC: 157 MG/DL (ref 70–130)
GLUCOSE BLDC GLUCOMTR-MCNC: 162 MG/DL (ref 70–130)
HCT VFR BLD AUTO: 24.1 % (ref 37.5–51)
HGB BLD-MCNC: 7.7 G/DL (ref 13–17.7)
MAGNESIUM SERPL-MCNC: 2 MG/DL (ref 1.6–2.4)
MCH RBC QN AUTO: 27 PG (ref 26.6–33)
MCHC RBC AUTO-ENTMCNC: 32 G/DL (ref 31.5–35.7)
MCV RBC AUTO: 84.6 FL (ref 79–97)
PHOSPHATE SERPL-MCNC: 3.3 MG/DL (ref 2.5–4.5)
PLATELET # BLD AUTO: 115 10*3/MM3 (ref 140–450)
PMV BLD AUTO: 10.4 FL (ref 6–12)
POTASSIUM BLD-SCNC: 3.5 MMOL/L (ref 3.5–5.2)
RBC # BLD AUTO: 2.85 10*6/MM3 (ref 4.14–5.8)
SODIUM BLD-SCNC: 140 MMOL/L (ref 136–145)
WBC NRBC COR # BLD: 2.21 10*3/MM3 (ref 3.4–10.8)
WBC STL QL MICRO: NORMAL

## 2019-04-24 PROCEDURE — 99233 SBSQ HOSP IP/OBS HIGH 50: CPT | Performed by: INTERNAL MEDICINE

## 2019-04-24 PROCEDURE — 25010000002 ONDANSETRON PER 1 MG: Performed by: INTERNAL MEDICINE

## 2019-04-24 PROCEDURE — 85027 COMPLETE CBC AUTOMATED: CPT | Performed by: INTERNAL MEDICINE

## 2019-04-24 PROCEDURE — 82962 GLUCOSE BLOOD TEST: CPT

## 2019-04-24 PROCEDURE — 83735 ASSAY OF MAGNESIUM: CPT | Performed by: INTERNAL MEDICINE

## 2019-04-24 PROCEDURE — 25010000002 PIPERACILLIN SOD-TAZOBACTAM PER 1 G: Performed by: INTERNAL MEDICINE

## 2019-04-24 PROCEDURE — 87205 SMEAR GRAM STAIN: CPT | Performed by: INTERNAL MEDICINE

## 2019-04-24 PROCEDURE — 84100 ASSAY OF PHOSPHORUS: CPT | Performed by: INTERNAL MEDICINE

## 2019-04-24 PROCEDURE — 80048 BASIC METABOLIC PNL TOTAL CA: CPT | Performed by: INTERNAL MEDICINE

## 2019-04-24 RX ORDER — L.ACID,PARA/B.BIFIDUM/S.THERM 8B CELL
1 CAPSULE ORAL DAILY
Status: DISCONTINUED | OUTPATIENT
Start: 2019-04-24 | End: 2019-04-25 | Stop reason: HOSPADM

## 2019-04-24 RX ADMIN — PANCRELIPASE 12000 UNITS OF LIPASE: 30000; 6000; 19000 CAPSULE, DELAYED RELEASE PELLETS ORAL at 11:38

## 2019-04-24 RX ADMIN — PANTOPRAZOLE SODIUM 40 MG: 40 TABLET, DELAYED RELEASE ORAL at 05:28

## 2019-04-24 RX ADMIN — SODIUM CHLORIDE, PRESERVATIVE FREE 3 ML: 5 INJECTION INTRAVENOUS at 20:16

## 2019-04-24 RX ADMIN — PANCRELIPASE 12000 UNITS OF LIPASE: 30000; 6000; 19000 CAPSULE, DELAYED RELEASE PELLETS ORAL at 08:59

## 2019-04-24 RX ADMIN — SODIUM CHLORIDE, PRESERVATIVE FREE 3 ML: 5 INJECTION INTRAVENOUS at 09:02

## 2019-04-24 RX ADMIN — FLUOXETINE HYDROCHLORIDE 20 MG: 20 CAPSULE ORAL at 09:00

## 2019-04-24 RX ADMIN — FLECAINIDE ACETATE 50 MG: 50 TABLET ORAL at 09:00

## 2019-04-24 RX ADMIN — FLECAINIDE ACETATE 50 MG: 50 TABLET ORAL at 20:15

## 2019-04-24 RX ADMIN — Medication 1 CAPSULE: at 15:03

## 2019-04-24 RX ADMIN — ONDANSETRON 4 MG: 2 INJECTION INTRAMUSCULAR; INTRAVENOUS at 08:59

## 2019-04-24 RX ADMIN — APIXABAN 5 MG: 5 TABLET, FILM COATED ORAL at 09:00

## 2019-04-24 RX ADMIN — POTASSIUM CHLORIDE 40 MEQ: 750 CAPSULE, EXTENDED RELEASE ORAL at 17:47

## 2019-04-24 RX ADMIN — ALLOPURINOL 100 MG: 100 TABLET ORAL at 09:00

## 2019-04-24 RX ADMIN — INSULIN LISPRO 2 UNITS: 100 INJECTION, SOLUTION INTRAVENOUS; SUBCUTANEOUS at 12:57

## 2019-04-24 RX ADMIN — APIXABAN 5 MG: 5 TABLET, FILM COATED ORAL at 20:15

## 2019-04-24 RX ADMIN — TAZOBACTAM SODIUM AND PIPERACILLIN SODIUM 4.5 G: 500; 4 INJECTION, SOLUTION INTRAVENOUS at 01:13

## 2019-04-24 RX ADMIN — POTASSIUM CHLORIDE 40 MEQ: 750 CAPSULE, EXTENDED RELEASE ORAL at 09:00

## 2019-04-24 RX ADMIN — METOCLOPRAMIDE 10 MG: 10 TABLET ORAL at 20:15

## 2019-04-24 RX ADMIN — PANCRELIPASE 12000 UNITS OF LIPASE: 30000; 6000; 19000 CAPSULE, DELAYED RELEASE PELLETS ORAL at 17:46

## 2019-04-24 RX ADMIN — TAZOBACTAM SODIUM AND PIPERACILLIN SODIUM 4.5 G: 500; 4 INJECTION, SOLUTION INTRAVENOUS at 11:38

## 2019-04-24 RX ADMIN — METOCLOPRAMIDE 10 MG: 10 TABLET ORAL at 08:59

## 2019-04-24 RX ADMIN — TAZOBACTAM SODIUM AND PIPERACILLIN SODIUM 4.5 G: 500; 4 INJECTION, SOLUTION INTRAVENOUS at 17:46

## 2019-04-24 RX ADMIN — METOCLOPRAMIDE 10 MG: 10 TABLET ORAL at 11:38

## 2019-04-24 RX ADMIN — METOCLOPRAMIDE 10 MG: 10 TABLET ORAL at 17:47

## 2019-04-25 VITALS
HEIGHT: 72 IN | DIASTOLIC BLOOD PRESSURE: 83 MMHG | SYSTOLIC BLOOD PRESSURE: 123 MMHG | WEIGHT: 171.2 LBS | OXYGEN SATURATION: 97 % | HEART RATE: 81 BPM | BODY MASS INDEX: 23.19 KG/M2 | TEMPERATURE: 98.2 F | RESPIRATION RATE: 18 BRPM

## 2019-04-25 PROBLEM — D70.9 FEBRILE NEUTROPENIA (HCC): Status: RESOLVED | Noted: 2019-04-21 | Resolved: 2019-04-25

## 2019-04-25 PROBLEM — R50.81 FEBRILE NEUTROPENIA (HCC): Status: RESOLVED | Noted: 2019-04-21 | Resolved: 2019-04-25

## 2019-04-25 LAB
ABO + RH BLD: NORMAL
ABO + RH BLD: NORMAL
ANION GAP SERPL CALCULATED.3IONS-SCNC: 12 MMOL/L
BASOPHILS # BLD AUTO: 0.02 10*3/MM3 (ref 0–0.2)
BASOPHILS NFR BLD AUTO: 0.8 % (ref 0–1.5)
BH BB BLOOD EXPIRATION DATE: NORMAL
BH BB BLOOD EXPIRATION DATE: NORMAL
BH BB BLOOD TYPE BARCODE: 5100
BH BB BLOOD TYPE BARCODE: 5100
BH BB DISPENSE STATUS: NORMAL
BH BB DISPENSE STATUS: NORMAL
BH BB PRODUCT CODE: NORMAL
BH BB PRODUCT CODE: NORMAL
BH BB UNIT NUMBER: NORMAL
BH BB UNIT NUMBER: NORMAL
BUN BLD-MCNC: 8 MG/DL (ref 8–23)
BUN/CREAT SERPL: 6.7 (ref 7–25)
CALCIUM SPEC-SCNC: 8.6 MG/DL (ref 8.6–10.5)
CHLORIDE SERPL-SCNC: 107 MMOL/L (ref 98–107)
CO2 SERPL-SCNC: 21 MMOL/L (ref 22–29)
CREAT BLD-MCNC: 1.19 MG/DL (ref 0.76–1.27)
DEPRECATED RDW RBC AUTO: 67.7 FL (ref 37–54)
EOSINOPHIL # BLD AUTO: 0.11 10*3/MM3 (ref 0–0.4)
EOSINOPHIL NFR BLD AUTO: 4.4 % (ref 0.3–6.2)
ERYTHROCYTE [DISTWIDTH] IN BLOOD BY AUTOMATED COUNT: 21.9 % (ref 12.3–15.4)
GFR SERPL CREATININE-BSD FRML MDRD: 60 ML/MIN/1.73
GLUCOSE BLD-MCNC: 135 MG/DL (ref 65–99)
GLUCOSE BLDC GLUCOMTR-MCNC: 129 MG/DL (ref 70–130)
GLUCOSE BLDC GLUCOMTR-MCNC: 133 MG/DL (ref 70–130)
HCT VFR BLD AUTO: 25.1 % (ref 37.5–51)
HGB BLD-MCNC: 8 G/DL (ref 13–17.7)
IMM GRANULOCYTES # BLD AUTO: 0.03 10*3/MM3 (ref 0–0.05)
IMM GRANULOCYTES NFR BLD AUTO: 1.2 % (ref 0–0.5)
LYMPHOCYTES # BLD AUTO: 1.46 10*3/MM3 (ref 0.7–3.1)
LYMPHOCYTES NFR BLD AUTO: 58.6 % (ref 19.6–45.3)
MCH RBC QN AUTO: 27 PG (ref 26.6–33)
MCHC RBC AUTO-ENTMCNC: 31.9 G/DL (ref 31.5–35.7)
MCV RBC AUTO: 84.8 FL (ref 79–97)
MONOCYTES # BLD AUTO: 0.34 10*3/MM3 (ref 0.1–0.9)
MONOCYTES NFR BLD AUTO: 13.7 % (ref 5–12)
NEUTROPHILS # BLD AUTO: 0.56 10*3/MM3 (ref 1.7–7)
NEUTROPHILS NFR BLD AUTO: 22.5 % (ref 42.7–76)
PLATELET # BLD AUTO: 129 10*3/MM3 (ref 140–450)
PMV BLD AUTO: 9.6 FL (ref 6–12)
POTASSIUM BLD-SCNC: 4 MMOL/L (ref 3.5–5.2)
RBC # BLD AUTO: 2.96 10*6/MM3 (ref 4.14–5.8)
SODIUM BLD-SCNC: 140 MMOL/L (ref 136–145)
UNIT  ABO: NORMAL
UNIT  ABO: NORMAL
UNIT  RH: NORMAL
UNIT  RH: NORMAL
WBC NRBC COR # BLD: 2.49 10*3/MM3 (ref 3.4–10.8)

## 2019-04-25 PROCEDURE — 25010000002 PIPERACILLIN SOD-TAZOBACTAM PER 1 G: Performed by: INTERNAL MEDICINE

## 2019-04-25 PROCEDURE — 99239 HOSP IP/OBS DSCHRG MGMT >30: CPT | Performed by: INTERNAL MEDICINE

## 2019-04-25 PROCEDURE — 82962 GLUCOSE BLOOD TEST: CPT

## 2019-04-25 PROCEDURE — 80048 BASIC METABOLIC PNL TOTAL CA: CPT | Performed by: INTERNAL MEDICINE

## 2019-04-25 PROCEDURE — 85025 COMPLETE CBC W/AUTO DIFF WBC: CPT | Performed by: INTERNAL MEDICINE

## 2019-04-25 RX ORDER — AMOXICILLIN AND CLAVULANATE POTASSIUM 500; 125 MG/1; MG/1
1 TABLET, FILM COATED ORAL
Qty: 6 TABLET | Refills: 0 | Status: SHIPPED | OUTPATIENT
Start: 2019-04-25 | End: 2019-06-05

## 2019-04-25 RX ADMIN — FLECAINIDE ACETATE 50 MG: 50 TABLET ORAL at 08:32

## 2019-04-25 RX ADMIN — ALLOPURINOL 100 MG: 100 TABLET ORAL at 08:33

## 2019-04-25 RX ADMIN — PANCRELIPASE 12000 UNITS OF LIPASE: 30000; 6000; 19000 CAPSULE, DELAYED RELEASE PELLETS ORAL at 12:10

## 2019-04-25 RX ADMIN — Medication 1 CAPSULE: at 08:32

## 2019-04-25 RX ADMIN — FLUOXETINE HYDROCHLORIDE 20 MG: 20 CAPSULE ORAL at 08:32

## 2019-04-25 RX ADMIN — PANTOPRAZOLE SODIUM 40 MG: 40 TABLET, DELAYED RELEASE ORAL at 05:17

## 2019-04-25 RX ADMIN — SODIUM CHLORIDE, PRESERVATIVE FREE 100 UNITS: 5 INJECTION INTRAVENOUS at 12:10

## 2019-04-25 RX ADMIN — SODIUM CHLORIDE, PRESERVATIVE FREE 3 ML: 5 INJECTION INTRAVENOUS at 08:34

## 2019-04-25 RX ADMIN — PANCRELIPASE 12000 UNITS OF LIPASE: 30000; 6000; 19000 CAPSULE, DELAYED RELEASE PELLETS ORAL at 08:32

## 2019-04-25 RX ADMIN — APIXABAN 5 MG: 5 TABLET, FILM COATED ORAL at 08:32

## 2019-04-25 RX ADMIN — TAZOBACTAM SODIUM AND PIPERACILLIN SODIUM 4.5 G: 500; 4 INJECTION, SOLUTION INTRAVENOUS at 01:49

## 2019-04-25 RX ADMIN — METOCLOPRAMIDE 10 MG: 10 TABLET ORAL at 08:32

## 2019-04-26 ENCOUNTER — READMISSION MANAGEMENT (OUTPATIENT)
Dept: CALL CENTER | Facility: HOSPITAL | Age: 69
End: 2019-04-26

## 2019-04-26 LAB
BACTERIA SPEC AEROBE CULT: NORMAL

## 2019-04-26 NOTE — OUTREACH NOTE
Prep Survey      Responses   Facility patient discharged from?  [facility]   Is patient eligible?  Yes   Discharge diagnosis  Febrile neutopenia   Does the patient have one of the following disease processes/diagnoses(primary or secondary)?  Other   Does the patient have Home health ordered?  Yes   What is the Home health agency?   [facility]    Is there a DME ordered?  No   Prep survey completed?  Yes          Nae Ibanez RN

## 2019-04-29 ENCOUNTER — READMISSION MANAGEMENT (OUTPATIENT)
Dept: CALL CENTER | Facility: HOSPITAL | Age: 69
End: 2019-04-29

## 2019-04-29 NOTE — OUTREACH NOTE
Medical Week 1 Survey      Responses   Facility patient discharged from?  [facility]   Does the patient have one of the following disease processes/diagnoses(primary or secondary)?  Other   Is there a successful TCM telephone encounter documented?  No   Week 1 attempt successful?  No   Unsuccessful attempts  Attempt 1          Judi Fritz RN

## 2019-05-01 ENCOUNTER — READMISSION MANAGEMENT (OUTPATIENT)
Dept: CALL CENTER | Facility: HOSPITAL | Age: 69
End: 2019-05-01

## 2019-05-01 NOTE — OUTREACH NOTE
Medical Week 1 Survey      Responses   Facility patient discharged from?  [facility]   Does the patient have one of the following disease processes/diagnoses(primary or secondary)?  Other   Is there a successful TCM telephone encounter documented?  No   Week 1 attempt successful?  No   Unsuccessful attempts  Attempt 2          Varsha Wolfe RN

## 2019-05-03 ENCOUNTER — READMISSION MANAGEMENT (OUTPATIENT)
Dept: CALL CENTER | Facility: HOSPITAL | Age: 69
End: 2019-05-03

## 2019-05-03 NOTE — OUTREACH NOTE
Medical Week 2 Survey      Responses   Facility patient discharged from?  [facility]   Does the patient have one of the following disease processes/diagnoses(primary or secondary)?  Other   Week 2 attempt successful?  Yes   Call start time  1505   Discharge diagnosis  Febrile neutopenia   Call end time  1507   Meds reviewed with patient/caregiver?  Yes   Is the patient having any side effects they believe may be caused by any medication additions or changes?  No   Does the patient have all medications ordered at discharge?  Yes   Is the patient taking all medications as directed (includes completed medication regime)?  Yes   Does the patient have a primary care provider?   Yes   Does the patient have an appointment with their PCP within 7 days of discharge?  Yes   Has the patient kept scheduled appointments due by today?  Yes   What is the Home health agency?   [facility]   Has home health visited the patient within 72 hours of discharge?  Yes   Psychosocial issues?  No   Did the patient receive a copy of their discharge instructions?  Yes   Nursing interventions  Reviewed instructions with patient   What is the patient's perception of their health status since discharge?  Improving   Is the patient/caregiver able to teach back signs and symptoms related to disease process for when to call PCP?  Yes   Is the patient/caregiver able to teach back signs and symptoms related to disease process for when to call 911?  Yes   Is the patient/caregiver able to teach back the hierarchy of who to call/visit for symptoms/problems? PCP, Specialist, Home health nurse, Urgent Care, ED, 911  Yes   Additional teach back comments  Says he was resting at time of call, no further fever.  He is doing some better.   Week 2 Call Completed?  Yes          Daisy Pena RN

## 2019-05-06 ENCOUNTER — OFFICE VISIT (OUTPATIENT)
Dept: CARDIOLOGY | Facility: CLINIC | Age: 69
End: 2019-05-06

## 2019-05-06 VITALS
BODY MASS INDEX: 23.3 KG/M2 | HEART RATE: 68 BPM | OXYGEN SATURATION: 99 % | HEIGHT: 72 IN | DIASTOLIC BLOOD PRESSURE: 74 MMHG | WEIGHT: 172 LBS | SYSTOLIC BLOOD PRESSURE: 122 MMHG

## 2019-05-06 DIAGNOSIS — I48.91 ATRIAL FIBRILLATION, UNSPECIFIED TYPE (HCC): Chronic | ICD-10-CM

## 2019-05-06 DIAGNOSIS — Z51.81 ENCOUNTER FOR MONITORING FLECAINIDE THERAPY: ICD-10-CM

## 2019-05-06 DIAGNOSIS — Z79.899 ENCOUNTER FOR MONITORING FLECAINIDE THERAPY: ICD-10-CM

## 2019-05-06 DIAGNOSIS — I25.10 NONOBSTRUCTIVE ATHEROSCLEROSIS OF CORONARY ARTERY: ICD-10-CM

## 2019-05-06 DIAGNOSIS — R07.9 CHEST PAIN IN ADULT: Primary | ICD-10-CM

## 2019-05-06 PROCEDURE — 99214 OFFICE O/P EST MOD 30 MIN: CPT | Performed by: INTERNAL MEDICINE

## 2019-05-06 PROCEDURE — 93000 ELECTROCARDIOGRAM COMPLETE: CPT | Performed by: INTERNAL MEDICINE

## 2019-05-06 NOTE — PROGRESS NOTES
subjective     Chief Complaint   Patient presents with   • Follow-up     Hospital Follow UP      History of Present Illness  Patient is 68 years old white male who has history of paroxysmal atrial fibrillation.  He is on anticoagulation therapy with Eliquis.  Patient was recently at [facility] due to bacterial enteritis and pancytopenia probably due to antineoplastic chemotherapy.  He was found to have minimal pulmonary infiltrate in the left lower lobe.  He was treated with antibiotics and is feeling much better.    Patient's wife says that he has some quick jabbing pains in the substernal area.  She is worried about his heart and gives him nitroglycerin off-and-on.  Patient denies any chest tightness or pressure sensation.    At this time patient is afebrile denies chest pain palpitations or shortness of breath.    Past Surgical History:   Procedure Laterality Date   • BILE DUCT STENT PLACEMENT      [facility] 2 weeks ago    • CARDIAC CATHETERIZATION  11/01/1999   • CARDIOVASCULAR STRESS TEST  09/2012   • CHOLECYSTECTOMY N/A 8/10/2018    Procedure: CHOLECYSTECTOMY LAPAROSCOPIC;  Surgeon: Ronak Downs MD;  Location: [facility] OR;  Service: General   • COLONOSCOPY     • CYSTOSCOPY BLADDER STONE LITHOTRIPSY     • ECHO - CONVERTED  09/2012   • ERCP N/A 8/14/2018    Procedure: ENDOSCOPIC RETROGRADE CHOLANGIOPANCREATOGRAPHY WITH PAPILLOTOMY;  Surgeon: Scot Su MD;  Location: [facility] OR;  Service: Gastroenterology   • ERCP N/A 10/1/2018    Procedure: ENDOSCOPIC RETROGRADE CHOLANGIOPANCREATOGRAPHY;  Surgeon: Jefry Luna MD;  Location: [facility] ENDOSCOPY;  Service: Gastroenterology   • KIDNEY STONE SURGERY     • KIDNEY STONE SURGERY      open abdominal surgery   • PORTACATH PLACEMENT Right 10/23/2018    Procedure: INSERTION OF PORTACATH;  Surgeon: Ronak Downs MD;  Location: [facility] OR;  Service: General   • TONSILLECTOMY     • UPPER GASTROINTESTINAL ENDOSCOPY   08/30/2012   • WHIPPLE PROCEDURE N/A 12/31/2018    Procedure: WHIPPLE PROCEDURE, BIOPSY OF LIVER, PORTAL VEIN RESECTION AND REPAIR, G/J TUBE PLACEMENT;  Surgeon: Miquel Brody MD;  Location: [facility];  Service: General     Family History   Problem Relation Age of Onset   • Heart attack Mother    • Heart disease Mother    • Stroke Mother    • Kidney disease Mother    • Heart failure Mother    • Lung disease Father    • Tuberculosis Father    • Diabetes Sister    • Heart attack Brother    • Heart failure Brother    • Heart disease Brother    • Diabetes Sister    • No Known Problems Brother    • No Known Problems Brother      Past Medical History:   Diagnosis Date   • Antral gastritis    • Asthma    • Atrial fibrillation (CMS/Formerly [facility])     treated with oral blood thinner   • Chest pain    • COPD (chronic obstructive pulmonary disease) (CMS/Formerly [facility])    • Coronary artery disease     no stents   • Diabetes mellitus (CMS/Formerly [facility])     type 2 - checks sugar 4 times per day    • Duodenitis    • Elevated cholesterol    • Emphysema of lung (CMS/Formerly [facility])    • Gallbladder disease     removed    • GERD (gastroesophageal reflux disease)    • Hard to intubate     busted lip last time   • Hyperlipidemia    • Hypertension    • Jaundice    • Kidney stone    • Kidney stones     had lithotripsy and passed 36 kidney stones as well as had one surgically removed.   • Malignant neoplasm of head of pancreas (CMS/Formerly [facility]) 9/5/2018   • Nausea    • Obstructive sleep apnea on CPAP     compliant with machine    • Palpitations    • Pneumonia 08/2018   • Reflux esophagitis    • Renal failure     stage 3 kidney disease   • Sepsis (CMS/Formerly [facility])      Patient Active Problem List   Diagnosis   • Hyperlipidemia   • Gout without tophus   • Anxiety and depression   • Primary insomnia   • Gastroesophageal reflux disease without esophagitis   • Nonobstructive atherosclerosis of coronary artery   • CKD stage 3 secondary to diabetes (CMS/Formerly [facility])   • DM2 (diabetes mellitus,  type 2) (CMS/HCC)   • Paroxysmal atrial fibrillation   • Chronic anticoagulation, Eliquis   • Encounter for monitoring flecainide therapy   • History of Malignant neoplasm of head of pancreas S/P [facility]ipple and chemotherapy (ongoing)  (CMS/HCC)   • Bacteremia due to Escherichia coli   • Biliary obstruction   • Thrombocytopenia (CMS/HCC)   • Anemia due to chemotherapy, chronic disease, and possible iron deficiency.  Transfuse 4/21/19   • Fever   • Post-operative confusion and somnolence, likely due to oversedation   • PAF on home Eliquis (CMS/HCC)   • Adult necrotizing enterocolitis (CMS/HCC)   • Encephalopathy   • Intra-abdominal abscess (S/P Percutaneous drain)   • Metabolic encephalopathy   • Dehydration   • Nausea   • Iron deficiency anemia secondary to inadequate dietary iron intake   • Malabsorption of iron   • Pancytopenia due to antineoplastic chemotherapy (CMS/HCC)   • Minimal pulmonary infiltrate left lower lobe.  Cannot rule out early pneumonia   • Possible bacterial enteritis seen on CT       Social History     Tobacco Use   • Smoking status: Never Smoker   • Smokeless tobacco: Never Used   Substance Use Topics   • Alcohol use: No   • Drug use: No       Allergies   Allergen Reactions   • Chlorhexidine Hives and Rash   • Contrast Dye Other (See Comments)     Can't have due to kidney failure per family   • Fentanyl Confusion and Unknown (See Comments)     Patient family reports extreme symptoms with fentanyl. Including confusion, restlessness, irritability an heavy sedation.       Current Outpatient Medications on File Prior to Visit   Medication Sig   • albuterol (PROVENTIL HFA;VENTOLIN HFA) 108 (90 BASE) MCG/ACT inhaler Inhale 2 puffs Every 4 (Four) Hours As Needed for Wheezing or Shortness of Air.   • albuterol (PROVENTIL) (2.5 MG/3ML) 0.083% nebulizer solution Take 2.5 mg by nebulization Every 6 (Six) Hours As Needed for Wheezing or Shortness of Air.   • allopurinol (ZYLOPRIM) 100 MG tablet Take 1 tablet  by mouth Daily.   • amoxicillin-clavulanate (AUGMENTIN) 500-125 MG per tablet Take 1 tablet by mouth Daily With Breakfast & Dinner.   • apixaban (ELIQUIS) 5 MG tablet tablet Take 1 tablet by mouth Every 12 (Twelve) Hours.   • cholecalciferol (VITAMIN D3) 1000 units tablet Take 1,000 Units by mouth Daily.   • flecainide (TAMBOCOR) 50 MG tablet Take 50 mg by mouth Every 12 (Twelve) Hours.   • FLUoxetine (PROzac) 20 MG capsule Take 20 mg by mouth 2 (Two) Times a Day.   • insulin lispro (humaLOG) 100 UNIT/ML injection Inject  under the skin into the appropriate area as directed 3 (Three) Times a Day As Needed (before meals prn (2 units if > 150-199, 4 units if > 199)).   • lactobacillus acidophilus (RISAQUAD) capsule capsule Take 1 capsule by mouth Daily.   • mometasone (ASMANEX) 220 MCG/INH inhaler Inhale 2 puffs Every Night.   • montelukast (SINGULAIR) 10 MG tablet Take 10 mg by mouth Every Night.   • nitroglycerin (NITROSTAT) 0.4 MG SL tablet Place 0.4 mg under the tongue as needed for chest pain.   • omeprazole (priLOSEC) 40 MG capsule Take 40 mg by mouth Daily.   • ondansetron (ZOFRAN) 8 MG tablet Take 1 tablet by mouth Every 8 (Eight) Hours As Needed for Nausea or Vomiting.   • oxyCODONE (ROXICODONE) 5 MG immediate release tablet Take 1-2 tabs every 4-6 hours as needed for pain   • pancrelipase, Lip-Prot-Amyl, (CREON) 50425 units capsule delayed-release particles capsule Take 12,000 units of lipase by mouth 3 (Three) Times a Day With Meals.   • polyethylene glycol (MIRALAX) packet Take 17 g by mouth Daily As Needed.   • prochlorperazine (COMPAZINE) 10 MG tablet Take 1 tablet by mouth Every 6 (Six) Hours As Needed for Nausea or Vomiting.   • promethazine (PHENERGAN) 12.5 MG tablet Take 1 tablet by mouth Every 6 (Six) Hours As Needed for Nausea or Vomiting.   • promethazine (PHENERGAN) 25 MG suppository Insert 1 suppository into the rectum Every 6 (Six) Hours As Needed for Nausea or Vomiting.   • rifaximin (XIFAXAN)  "550 MG tablet Take 550 mg by mouth 2 (Two) Times a Day.   • Tiotropium Bromide-Olodaterol 2.5-2.5 MCG/ACT aerosol solution Inhale 2 puffs Daily.     Current Facility-Administered Medications on File Prior to Visit   Medication   • sodium chloride 0.9 % infusion  - ADS Override Pull   • sodium chloride 0.9 % infusion  - ADS Override Pull         The following portions of the patient's history were reviewed and updated as appropriate: allergies, current medications, past family history, past medical history, past social history, past surgical history and problem list.    Review of Systems   Constitution: Positive for weakness and malaise/fatigue. Negative for chills and fever.   HENT: Negative.  Negative for congestion.    Eyes: Negative.    Cardiovascular: Positive for chest pain. Negative for cyanosis, dyspnea on exertion, irregular heartbeat, leg swelling, near-syncope, orthopnea, palpitations, paroxysmal nocturnal dyspnea and syncope.   Respiratory: Positive for shortness of breath. Negative for cough and wheezing.    Hematologic/Lymphatic: Negative.    Musculoskeletal: Negative.    Gastrointestinal: Negative.    Neurological: Negative for headaches.          Objective:     /74 (BP Location: Left arm, Patient Position: Sitting)   Pulse 68   Ht 182.9 cm (72\")   Wt 78 kg (172 lb)   SpO2 99%   BMI 23.33 kg/m²   Physical Exam   Constitutional: He appears well-developed and well-nourished. No distress.   HENT:   Head: Normocephalic and atraumatic.   Mouth/Throat: Oropharynx is clear and moist. No oropharyngeal exudate.   Eyes: Conjunctivae and EOM are normal. Pupils are equal, round, and reactive to light. No scleral icterus.   Neck: Normal range of motion. Neck supple. No JVD present. No tracheal deviation present. No thyromegaly present.   Cardiovascular: Normal rate, regular rhythm, normal heart sounds and intact distal pulses. PMI is not displaced. Exam reveals no gallop, no friction rub and no decreased " pulses.   No murmur heard.  Pulses:       Carotid pulses are 3+ on the right side, and 3+ on the left side.       Radial pulses are 3+ on the right side, and 3+ on the left side.   Pulmonary/Chest: Effort normal and breath sounds normal. No respiratory distress. He has no wheezes. He has no rales. He exhibits no tenderness.   Abdominal: Soft. Bowel sounds are normal. He exhibits no distension, no abdominal bruit and no mass. There is no splenomegaly or hepatomegaly. There is no tenderness. There is no rebound and no guarding.   Musculoskeletal: Normal range of motion. He exhibits no edema, tenderness or deformity.   Lymphadenopathy:     He has no cervical adenopathy.   Neurological: He is alert. He has normal reflexes. No cranial nerve deficit. He exhibits normal muscle tone. Coordination normal.   Skin: Skin is warm and dry. No rash noted. He is not diaphoretic. No erythema.   Psychiatric: He has a normal mood and affect. His behavior is normal. Judgment and thought content normal.         Lab Review  Lab Results   Component Value Date     04/25/2019    K 4.0 04/25/2019     04/25/2019    BUN 8 04/25/2019    CREATININE 1.19 04/25/2019    GLUCOSE 135 (H) 04/25/2019    CALCIUM 8.6 04/25/2019    ALT 32 04/22/2019    ALKPHOS 113 04/22/2019    LABIL2 1.5 03/25/2016     Lab Results   Component Value Date    CKTOTAL 24 (L) 02/01/2019     Lab Results   Component Value Date    WBC 2.49 (L) 04/25/2019    HGB 8.0 (L) 04/25/2019    HCT 25.1 (L) 04/25/2019     (L) 04/25/2019     Lab Results   Component Value Date    INR 1.24 (H) 04/21/2019    INR 1.24 (H) 04/21/2019    INR 1.35 (H) 01/24/2019     Lab Results   Component Value Date    MG 2.0 04/24/2019     Lab Results   Component Value Date    PSA 1.450 07/11/2017    TSH 1.502 11/18/2018     Lab Results   Component Value Date    BNP 50.0 01/26/2019     Lab Results   Component Value Date    CHLPL 144 03/25/2016    CHOL 53 01/16/2019    TRIG 57 01/21/2019    HDL  20 (L) 01/16/2019    VLDL 16.8 01/16/2019    LDLHDL 0.81 01/16/2019     Lab Results   Component Value Date    LDL 16 01/16/2019    LDL 52 11/18/2018         ECG 12 Lead  Date/Time: 5/6/2019 12:10 PM  Performed by: Adiel Denney MD  Authorized by: Adiel Denney MD   Comparison: compared with previous ECG from 4/22/2019  Similar to previous ECG  Rhythm: sinus rhythm  Rate: normal  BPM: 84  Conduction: conduction normal  ST Segments: ST segments normal  T Waves: T waves normal  QRS axis: normal  Other: no other findings    Clinical impression: normal ECG               I personally viewed and interpreted the patient's LAB data         Assessment:     1. Chest pain in adult    2. Nonobstructive atherosclerosis of coronary artery    3. Atrial fibrillation, unspecified type (CMS/HCC)    4. Encounter for monitoring flecainide therapy          Plan:     Chest pain is atypical.  EKG does not show any acute changes.  Coronary angiography in February 2014 did not show any significant disease    Stress and rest cardiac PET scan did not show any ischemia.    EKG also shows patient is in sinus rhythm.  He was advised to continue medication including Tambocor.  Follow-up scheduled      No Follow-up on file.

## 2019-05-08 ENCOUNTER — OFFICE VISIT (OUTPATIENT)
Dept: ONCOLOGY | Facility: CLINIC | Age: 69
End: 2019-05-08

## 2019-05-08 ENCOUNTER — LAB (OUTPATIENT)
Dept: ONCOLOGY | Facility: CLINIC | Age: 69
End: 2019-05-08

## 2019-05-08 VITALS
BODY MASS INDEX: 23.23 KG/M2 | WEIGHT: 171.3 LBS | RESPIRATION RATE: 18 BRPM | SYSTOLIC BLOOD PRESSURE: 113 MMHG | DIASTOLIC BLOOD PRESSURE: 73 MMHG | HEART RATE: 108 BPM | OXYGEN SATURATION: 98 % | TEMPERATURE: 98 F

## 2019-05-08 DIAGNOSIS — E86.0 DEHYDRATION: ICD-10-CM

## 2019-05-08 DIAGNOSIS — C25.0 MALIGNANT NEOPLASM OF HEAD OF PANCREAS (HCC): Primary | ICD-10-CM

## 2019-05-08 DIAGNOSIS — K90.9 MALABSORPTION OF IRON: ICD-10-CM

## 2019-05-08 DIAGNOSIS — R11.0 NAUSEA: ICD-10-CM

## 2019-05-08 DIAGNOSIS — D50.9 IRON DEFICIENCY ANEMIA, UNSPECIFIED IRON DEFICIENCY ANEMIA TYPE: ICD-10-CM

## 2019-05-08 DIAGNOSIS — G89.3 NEOPLASM RELATED PAIN: ICD-10-CM

## 2019-05-08 DIAGNOSIS — R97.8 ELEVATED CA 19-9 LEVEL: ICD-10-CM

## 2019-05-08 LAB
ALBUMIN SERPL-MCNC: 3.66 G/DL (ref 3.5–5.2)
ALBUMIN/GLOB SERPL: 1 G/DL
ALP SERPL-CCNC: 158 U/L (ref 39–117)
ALT SERPL W P-5'-P-CCNC: 25 U/L (ref 1–41)
ANION GAP SERPL CALCULATED.3IONS-SCNC: 13.3 MMOL/L
AST SERPL-CCNC: 32 U/L (ref 1–40)
BASOPHILS # BLD AUTO: 0.03 10*3/MM3 (ref 0–0.2)
BASOPHILS NFR BLD AUTO: 0.6 % (ref 0–1.5)
BILIRUB SERPL-MCNC: 0.3 MG/DL (ref 0.2–1.2)
BUN BLD-MCNC: 15 MG/DL (ref 8–23)
BUN/CREAT SERPL: 12.1 (ref 7–25)
CALCIUM SPEC-SCNC: 9.8 MG/DL (ref 8.6–10.5)
CHLORIDE SERPL-SCNC: 105 MMOL/L (ref 98–107)
CO2 SERPL-SCNC: 22.7 MMOL/L (ref 22–29)
CREAT BLD-MCNC: 1.24 MG/DL (ref 0.76–1.27)
DEPRECATED RDW RBC AUTO: 63.8 FL (ref 37–54)
EOSINOPHIL # BLD AUTO: 0.03 10*3/MM3 (ref 0–0.4)
EOSINOPHIL NFR BLD AUTO: 0.6 % (ref 0.3–6.2)
ERYTHROCYTE [DISTWIDTH] IN BLOOD BY AUTOMATED COUNT: 21 % (ref 12.3–15.4)
GFR SERPL CREATININE-BSD FRML MDRD: 58 ML/MIN/1.73
GLOBULIN UR ELPH-MCNC: 3.7 GM/DL
GLUCOSE BLD-MCNC: 205 MG/DL (ref 65–99)
HCT VFR BLD AUTO: 32.4 % (ref 37.5–51)
HGB BLD-MCNC: 10.3 G/DL (ref 13–17.7)
IMM GRANULOCYTES # BLD AUTO: 0.05 10*3/MM3 (ref 0–0.05)
IMM GRANULOCYTES NFR BLD AUTO: 1 % (ref 0–0.5)
LYMPHOCYTES # BLD AUTO: 1.58 10*3/MM3 (ref 0.7–3.1)
LYMPHOCYTES NFR BLD AUTO: 30.4 % (ref 19.6–45.3)
MCH RBC QN AUTO: 27.8 PG (ref 26.6–33)
MCHC RBC AUTO-ENTMCNC: 31.8 G/DL (ref 31.5–35.7)
MCV RBC AUTO: 87.6 FL (ref 79–97)
MONOCYTES # BLD AUTO: 0.55 10*3/MM3 (ref 0.1–0.9)
MONOCYTES NFR BLD AUTO: 10.6 % (ref 5–12)
NEUTROPHILS # BLD AUTO: 2.96 10*3/MM3 (ref 1.7–7)
NEUTROPHILS NFR BLD AUTO: 56.8 % (ref 42.7–76)
PLATELET # BLD AUTO: 268 10*3/MM3 (ref 140–450)
PMV BLD AUTO: 10.3 FL (ref 6–12)
POTASSIUM BLD-SCNC: 3.8 MMOL/L (ref 3.5–5.2)
PROT SERPL-MCNC: 7.4 G/DL (ref 6–8.5)
RBC # BLD AUTO: 3.7 10*6/MM3 (ref 4.14–5.8)
SODIUM BLD-SCNC: 141 MMOL/L (ref 136–145)
WBC NRBC COR # BLD: 5.2 10*3/MM3 (ref 3.4–10.8)

## 2019-05-08 PROCEDURE — 85025 COMPLETE CBC W/AUTO DIFF WBC: CPT | Performed by: INTERNAL MEDICINE

## 2019-05-08 PROCEDURE — 99214 OFFICE O/P EST MOD 30 MIN: CPT | Performed by: INTERNAL MEDICINE

## 2019-05-08 PROCEDURE — 36415 COLL VENOUS BLD VENIPUNCTURE: CPT

## 2019-05-08 PROCEDURE — 80053 COMPREHEN METABOLIC PANEL: CPT | Performed by: INTERNAL MEDICINE

## 2019-05-08 NOTE — PROGRESS NOTES
NAME: Todd Phillips    : 1950    DATE : 19    DIAGNOSIS:   1.   Stage III moderately differentiated adenocarcinoma of the head of the pancreas with involvement of the duodenum ([id number]).    2.  Repeated episodes of bacteremia due to cholangitis -    3.  Refractory nausea/vomiting    CHIEF COMPLAINT:  Follow up of pancreatic cancer and toxicity check     TREATMENT:  1.       2.  On 18, Dr. Brody performed  Pancreaticoduodenectomy, wedge biopsy of the liver, portal vein resection and lateral repair and gastrojejunostomy tube placement    3.       HISTORY OF PRESENT ILLNESS:   Todd Phillips is a very pleasant 68 y.o. male who is being seen today at the request of Dr. Miquel Brody for evaluation and treatment of pancreatic cancer.  Mr. Phillips initially developed symptoms in 2018. At that time he had pain in his upper abdomen which would radiate to his back.  In July, the pain intensified.  He was seen in the ER and also by Dr. Su.  He underwent RUQ U/S and then HIDA scan and it was determined he had a dysfunctional gallbladder.  He saw Dr. Downs and had cholecystectomy on 8-10-18.  Pathology showed chronic cholecystitis and an incidental benign lymph node.  He re-presented in the post-operative period with jaundice.  Dr. Su performed ERCP on18.  He was noted to have a 3 cm irregular tight stricture of the distal common bile duct with proximal biliary ductal dilatation.  Biliary papillotomy and stricture dilatation performed and brushings taken.  A 10 Fr x 5 cm biliary stent was placed.  Brushings were taken but were negative.  There was sl dilatation of the distal pancreatic duct without discrete stricture.   He was discharged with improving jaundice on .  On  he represented with sepsis due to Klebsiella pneumonia bacteremia and was admitted.  He then was referred to Dr. Brody for evaluation and treatment.  He has been set up for EUS with biopsy next Friday for  "what appears to be a primary pancreatic malignancy in the head of the pancreas.  Dr. Brody has referred him for consideration of neoadjuvant therapy.    INTERVAL HISTORY:  Mr. Phillips is here today with his son for follow up of pancreatic cancer.  Since his last visit, he was admitted to [facility] for febrile neutropenia with evidence of pneumonia and enteritis.  His blood counts rebounded and his symptoms resolved.  Unfortunately, he is feeling poorly.  He has a lot of difficulty with abdominal pain and nausea after eating.  This doesn't happen every time he eats, but most of the time (maybe 70%).  He says it happens with liquids as well, maybe more often than with solid food.  With this, he has continued to be able to eat well (he had 2 [employer] burgers and a soda for lunch today). He rarely takes pain medication even though he is obviously in pain because he \"doesn't want to get hooked on it.\"  He has had relatively frequent flares of his gout and his son asks about increasing his allopurinol dose.      PAST MEDICAL HISTORY:  Past Medical History:   Diagnosis Date   • Antral gastritis    • Asthma    • Atrial fibrillation (CMS/HCC)     treated with oral blood thinner   • Chest pain    • COPD (chronic obstructive pulmonary disease) (CMS/HCC)    • Coronary artery disease     no stents   • Diabetes mellitus (CMS/HCC)     type 2 - checks sugar 4 times per day    • Duodenitis    • Elevated cholesterol    • Emphysema of lung (CMS/HCC)    • Gallbladder disease     removed    • GERD (gastroesophageal reflux disease)    • Hard to intubate     busted lip last time   • Hyperlipidemia    • Hypertension    • Jaundice    • Kidney stone    • Kidney stones     had lithotripsy and passed 36 kidney stones as well as had one surgically removed.   • Malignant neoplasm of head of pancreas (CMS/HCC) 9/5/2018   • Nausea    • Obstructive sleep apnea on CPAP     compliant with machine    • Palpitations    • Pneumonia 08/2018   • Reflux " esophagitis    • Renal failure     stage 3 kidney disease   • Sepsis (CMS/HCC)        PAST SURGICAL HISTORY:  Past Surgical History:   Procedure Laterality Date   • BILE DUCT STENT PLACEMENT      [facility] 2 weeks ago    • CARDIAC CATHETERIZATION  11/01/1999   • CARDIOVASCULAR STRESS TEST  09/2012   • CHOLECYSTECTOMY N/A 8/10/2018    Procedure: CHOLECYSTECTOMY LAPAROSCOPIC;  Surgeon: Ronak Downs MD;  Location: [facility] OR;  Service: General   • COLONOSCOPY     • CYSTOSCOPY BLADDER STONE LITHOTRIPSY     • ECHO - CONVERTED  09/2012   • ERCP N/A 8/14/2018    Procedure: ENDOSCOPIC RETROGRADE CHOLANGIOPANCREATOGRAPHY WITH PAPILLOTOMY;  Surgeon: Scot Su MD;  Location:  [facility] OR;  Service: Gastroenterology   • ERCP N/A 10/1/2018    Procedure: ENDOSCOPIC RETROGRADE CHOLANGIOPANCREATOGRAPHY;  Surgeon: Jefry Luna MD;  Location:  [facility];  Service: Gastroenterology   • KIDNEY STONE SURGERY     • KIDNEY STONE SURGERY      open abdominal surgery   • PORTACATH PLACEMENT Right 10/23/2018    Procedure: INSERTION OF PORTACATH;  Surgeon: Ronak Downs MD;  Location: [facility] OR;  Service: General   • TONSILLECTOMY     • UPPER GASTROINTESTINAL ENDOSCOPY  08/30/2012   • WHIPPLE PROCEDURE N/A 12/31/2018    Procedure: WHIPPLE PROCEDURE, BIOPSY OF LIVER, PORTAL VEIN RESECTION AND REPAIR, G/J TUBE PLACEMENT;  Surgeon: Miquel Brody MD;  Location: [facility] OR;  Service: General       FAMILY HISTORY:  Family History   Problem Relation Age of Onset   • Heart attack Mother    • Heart disease Mother    • Stroke Mother    • Kidney disease Mother    • Heart failure Mother    • Lung disease Father    • Tuberculosis Father    • Diabetes Sister    • Heart attack Brother    • Heart failure Brother    • Heart disease Brother    • Diabetes Sister    • No Known Problems Brother    • No Known Problems Brother        SOCIAL HISTORY:  Social History     Socioeconomic History   • Marital  status:      Spouse name: Not on file   • Number of children: Not on file   • Years of education: Not on file   • Highest education level: Not on file   Tobacco Use   • Smoking status: Never Smoker   • Smokeless tobacco: Never Used   Substance and Sexual Activity   • Alcohol use: No   • Drug use: No   • Sexual activity: Defer     Partners: Female   Social History Narrative    He is  and lives alone with his wife although they have 3 children together who all live close by. He is retired from the [employer] and was exposed to Agent Orange during [place]. He is a lifelong nonsmoker.         A DIL  of cancer.    REVIEW OF SYSTEMS:   A comprehensive 14 point review of systems was performed.  Significant findings as mentioned above.  All other systems reviewed and are negative.      MEDICATIONS:  The current medication list was reviewed in the EMR    Current Outpatient Medications:   •  albuterol (PROVENTIL HFA;VENTOLIN HFA) 108 (90 BASE) MCG/ACT inhaler, Inhale 2 puffs Every 4 (Four) Hours As Needed for Wheezing or Shortness of Air., Disp: , Rfl:   •  albuterol (PROVENTIL) (2.5 MG/3ML) 0.083% nebulizer solution, Take 2.5 mg by nebulization Every 6 (Six) Hours As Needed for Wheezing or Shortness of Air., Disp: , Rfl:   •  allopurinol (ZYLOPRIM) 100 MG tablet, Take 1 tablet by mouth Daily., Disp: 90 tablet, Rfl: 0  •  amoxicillin-clavulanate (AUGMENTIN) 500-125 MG per tablet, Take 1 tablet by mouth Daily With Breakfast & Dinner., Disp: 6 tablet, Rfl: 0  •  apixaban (ELIQUIS) 5 MG tablet tablet, Take 1 tablet by mouth Every 12 (Twelve) Hours., Disp: 60 tablet, Rfl:   •  cholecalciferol (VITAMIN D3) 1000 units tablet, Take 1,000 Units by mouth Daily., Disp: , Rfl:   •  flecainide (TAMBOCOR) 50 MG tablet, Take 50 mg by mouth Every 12 (Twelve) Hours., Disp: , Rfl:   •  FLUoxetine (PROzac) 20 MG capsule, Take 20 mg by mouth 2 (Two) Times a Day., Disp: , Rfl:   •  insulin lispro (humaLOG) 100 UNIT/ML injection,  Inject  under the skin into the appropriate area as directed 3 (Three) Times a Day As Needed (before meals prn (2 units if > 150-199, 4 units if > 199))., Disp: , Rfl:   •  lactobacillus acidophilus (RISAQUAD) capsule capsule, Take 1 capsule by mouth Daily., Disp: 30 capsule, Rfl: 0  •  mometasone (ASMANEX) 220 MCG/INH inhaler, Inhale 2 puffs Every Night., Disp: , Rfl:   •  montelukast (SINGULAIR) 10 MG tablet, Take 10 mg by mouth Every Night., Disp: , Rfl:   •  nitroglycerin (NITROSTAT) 0.4 MG SL tablet, Place 0.4 mg under the tongue as needed for chest pain., Disp: , Rfl:   •  omeprazole (priLOSEC) 40 MG capsule, Take 40 mg by mouth Daily., Disp: , Rfl:   •  ondansetron (ZOFRAN) 8 MG tablet, Take 1 tablet by mouth Every 8 (Eight) Hours As Needed for Nausea or Vomiting., Disp: 60 tablet, Rfl: 3  •  oxyCODONE (ROXICODONE) 5 MG immediate release tablet, Take 1-2 tabs every 4-6 hours as needed for pain, Disp: 100 tablet, Rfl: 0  •  pancrelipase, Lip-Prot-Amyl, (CREON) 08262 units capsule delayed-release particles capsule, Take 12,000 units of lipase by mouth 3 (Three) Times a Day With Meals., Disp: , Rfl:   •  polyethylene glycol (MIRALAX) packet, Take 17 g by mouth Daily As Needed., Disp: , Rfl:   •  prochlorperazine (COMPAZINE) 10 MG tablet, Take 1 tablet by mouth Every 6 (Six) Hours As Needed for Nausea or Vomiting., Disp: 60 tablet, Rfl: 3  •  promethazine (PHENERGAN) 12.5 MG tablet, Take 1 tablet by mouth Every 6 (Six) Hours As Needed for Nausea or Vomiting., Disp: 90 tablet, Rfl: 3  •  promethazine (PHENERGAN) 25 MG suppository, Insert 1 suppository into the rectum Every 6 (Six) Hours As Needed for Nausea or Vomiting., Disp: 10 each, Rfl: 0  •  rifaximin (XIFAXAN) 550 MG tablet, Take 550 mg by mouth 2 (Two) Times a Day., Disp: , Rfl:   •  Tiotropium Bromide-Olodaterol 2.5-2.5 MCG/ACT aerosol solution, Inhale 2 puffs Daily., Disp: , Rfl:   No current facility-administered medications for this visit.      Facility-Administered Medications Ordered in Other Visits:   •  sodium chloride 0.9 % infusion  - ADS Override Pull, , , ,   •  sodium chloride 0.9 % infusion  - ADS Override Pull, , , ,     ALLERGIES:    Allergies   Allergen Reactions   • Chlorhexidine Hives and Rash   • Contrast Dye Other (See Comments)     Can't have due to kidney failure per family   • Fentanyl Confusion and Unknown (See Comments)     Patient family reports extreme symptoms with fentanyl. Including confusion, restlessness, irritability an heavy sedation.     PHYSICAL EXAM:  Vitals:    05/08/19 1425   BP: 113/73   Pulse: 108   Resp: 18   Temp: 98 °F (36.7 °C)   SpO2: 98%   General:  Alert and oriented.  Appears fatigued and uncomfortable today   HEENT:  Pupils are equal, round and reactive to light and accommodation, Extra-ocular movements full, Oropharyx clear, MM dry  Neck:  No JVD, thyromegaly or lymphadenopathy  CV:  Regular rate/rhythm, no murmurs, rubs or gallops  Resp:  Lungs are clear to auscultation bilaterally  Abd:  Soft, diffuse non-specific tenderness with palpation, non-distended, bowel sounds intact, no organomegaly or masses.  Surgical scars present.  Ext:  No clubbing, cyanosis or edema.    Lymph:  No cervical, supraclavicular, axillary,adenopathy  Neuro: grossly non-focal exam    PATHOLOGY:  08-15-18         12-31-18 Whipple  Final Diagnosis   1. LIVER, WEDGE BIOPSY:  Small bland ductular proliferation compatible with bile duct adenoma.   Negative for metastatic carcinoma.   2. OMENTUM:  Mild chronic inflammation and surface adhesions; negative for neoplasm.   3. HEPATIC ARTERY LYMPH NODE:  Sinus histiocytosis; single lymph node negative for metastatic carcinoma. (0/1)  4. SUBMITTED BILE DUCT MARGIN:  Margin with chronic inflammation; negative for adenocarcinoma.   5. AORTA-CAVAL LYMPH NODES:  Two lymph nodes negative for metastatic carcinoma. (0/2)  6. PANCREATODUODENECTOMY (WHIPPLE PROCEDURE);  Invasive moderately  differentiated adenocarcinoma arising in head of pancreas and involving duodenum.  Gross tumor size 3.7 cm in greatest dimension.  Perineural and peripancreatic extension identified.   Soft tissue at superior mesenteric artery positive for neoplasm.  Posterior/uncinate margin involved by neoplasm.  Five of twelve peripancreatic lymph nodes positive for metastatic neoplasm. See Template.        PANCREAS EXOCRINE:  TYPE OF SPECIMEN/PROCEDURE: Whipple procedure  SPECIMEN INTEGRITY: Intact  TUMOR SITE (HEAD, BODY, TAIL): Head  TUMOR SIZE (GREATEST DIMENSION): 3.7 cm greatest dimension  HISTOLOGIC TYPE: Adenocarcinoma  HISTOLOGIC rdGrdRrdArdDrdErd:rd rd3rd TUMOR EXTENSION: Neoplasm involves duodenum and peripancreatic fat  SURGICAL MARGINS, IF INVOLVED, (SPECIFY WHERE): Involved  MARGINS EXAMINED: See below  PARENCHYMAL MARGIN: Not involved  UNCINATE: Involved  BILE DUCT: Not involved  DISTAL MARGIN: Not involved   PROXIMAL MARGIN: Not involved   OTHER MARGINS: SMA margin involved   CLOSEST MARGIN: Margins involved    SPECIFIC MARGIN: SMA and posterior/uncinate  INVADES CELIAC AXIS OR SMA: No  VASCULAR/LYMPHATIC INVASION: Present  PERINEURAL INVASION:  Present  REGIONAL LYMPH NODES: Submitted  NUMBER OF LYMPH NODES INVOLVED: 5  NUMBER OF LYMPH NODES EXAMINED: 15  EXTRACAPSULAR EXTENSION: Focally present  TREATMENT EFFECT: No known presurgical therapy  ADDITIONAL PATHOLOGIC FINDINGS: Chronic pancreatitis   OTHER STUDIES: Available upon request  AJCC PATHOLOGIC STAGE: (COMPLETED BY PATHOLOGIST BASED ONLY ON TISSUE FINDINGS, MORE EXTENSIVE DISEASE MAY NOT BE KNOW TO THE PATHOLOGIST)  pT=  2   pN= 2  AJCC PATHOLOGIC STAGE: III     1-11-19 Abdominal Fluid:  Final Diagnosis    ABDOMINAL FLUID:  Negative for malignancy.  Acute inflammation and necrotic debris.  No tumor seen.         ENDOSCOPY:  08-14-18 ERCP with papillotomy, balloon rotation of the biliary stricture, pathology brushings, ileum stent placement ([place])  1.  Irregular 3 cm  distal common bile duct stricture concerning for neoplastic disease. Biliary papillotomy, stricture dilatation by  through the scope balloon, cytology brushings performed and 10 [place] by 5 cm biliary stent placed.    2.  Dilated common hepatic duct to 15 mm when fully contrast filled.  Dilated intrahepatic biliary tree.    3.  No stones proximal to the stricture were identified.    4.  Slight dilatation of the distal pancreatic duct without a discrete stricture identified.    IMAGIN18 CT Abdomen Pelvis Stone Protocol   Findings  - LOWER THORAX: Clear. No effusions.  - LIVER: Homogeneous. No focal hepatic mass or ductal dilatation.  - GALLBLADDER: MINIMAL STRANDING AROUND REGION OF GALLBLADDER FOSSA  THAT MAY BE POSTSURGICAL.  - PANCREAS: Unremarkable. No mass or ductal dilatation.  - SPLEEN: Homogeneous. No splenomegaly.  - ADRENALS: No mass.  - KIDNEYS: No mass. No obstructive uropathy.  No evidence of urolithiasis.  - GI TRACT: SMALL HIATAL HERNIA.  - PERITONEUM: No free air. No free fluid or loculated fluid collections.  - MESENTERY: Unremarkable.  - LYMPH NODES: No lymphadenopathy.  - VASCULATURE: ATHEROSCLEROTIC VASCULAR CALCIFICATION.  - ABDOMINAL WALL: No focal hernia or mass.  - OTHER: None.  - BLADDER: No focal mass or significant wall thickening  - REPRODUCTIVE: Unremarkable as visualized.  - APPENDIX: Nondistended. No surrounding inflammation.  - BONES: No acute bony abnormality.     IMPRESSION:  - Minimal stranding around region of gallbladder fossa that may be postsurgical.     18 US Liver   FINDINGS:   - Visualized pancreas is unremarkable.   - The gallbladder is absent. No collection identified.   - The CBD measures 10.18055774666 mm    .  - The liver demonstrates normal echogenicity without focal lesion.    - No ascites demonstrated.        IMPRESSION:  - As above.    18 CT Abdomen Pelvis Stone Protocol   FINDINGS:   - Minimal bibasilar atelectasis.  - Hiatal hernia.  - The  liver is homogeneous. There is no evidence of focal hepatic mass.  - The spleen is homogeneous.  - Stent is noted traversing the common bile duct.  - 3 mm nodule in the right lung on image 8 of the axial series.  - There is no adrenal enlargement.  - The kidneys show no evidence of hydronephrosis or hydroureter. I do not see any distal ureteral stones.   - Otherwise I do not see any free fluid or walled off fluid collections.  - There is moderate to large volume stool in the colon.  - There is no evidence of mesenteric or retroperitoneal adenopathy.     IMPRESSION:  1. Tiny nodule in the right lung base.  2. Minimal bibasilar atelectasis.  3. Moderate to large volume stool in the colon.     Other findings as above.    08-22-18 CT Abdomen Pelvis With Contrast   FINDINGS:   - Tiny left basilar nodule measuring 4 mm on image 14 of the axial series.  - Minimal bibasilar atelectasis.  - The liver is homogeneous. There is no evidence of focal hepatic mass  - The spleen is homogeneous  - Indistinct appearance of the pancreatic head. A biliary stent is present.  - Small left adrenal nodule is present.  - The kidneys show no evidence of hydronephrosis or hydroureter. I do not see any distal ureteral stones.   - Otherwise I do not see any free fluid or walled off fluid collections.  - Large volume stool is present in the colon.     IMPRESSION:  1. Slightly enlarged, indistinct appearance of the pancreatic head.  - Underlying neoplasm certainly not excluded but no delineable mass is present. There is a biliary stent.    2. Tiny nodule in the left lung base.    3. Small left adrenal nodule.    4. Hiatal hernia.    5. Moderate to large volume stool in the colon.      09-11-18 CT Chest Without Contrast   FINDINGS:   - Minimal coronary artery calcifications present.  - No pericardial or pleural effusions.  - No parenchymal soft tissue nodules or masses. There are findings of COPD.     IMPRESSION:  - COPD.  - Hiatal hernia.  -  Minimal coronary artery calcification.       09-11-18 CT Abdomen Pelvis Without Contrast   FINDINGS:   - Lung bases show mild increased interstitial markings.  - There is a hiatal hernia.  - The liver is homogeneous. There is no evidence of focal hepatic mass.  - The spleen is homogeneous.  - Mildly indistinct appearance of the pancreas. There is a biliary stent present. I cannot exclude mild degree of pancreatitis..  - There is no adrenal enlargement.  - The kidneys show no evidence of hydronephrosis or hydroureter. I do not see any distal ureteral stones.   - Otherwise I do not see any free fluid or walled off fluid collections.  - Large volume stool is present in the colon.  - Urinary bladder wall is slightly thickened.  - There is no evidence of mesenteric or retroperitoneal adenopathy.    IMPRESSION:  1. Mildly indistinct appearance of the proximal pancreas.  2. Urinary bladder wall thickening.  3. Large volume stool in the colon.    09-14-18 CT Abdomen Without Contrast   FINDINGS:   - Again noted is very mild indistinct appearance of the pancreatic head. Stent is stable in position.  - The liver is stable and homogeneous.  - Spleen is homogeneous.  - No adrenal enlargement.  - Moderate to large volume stool in the colon.     IMPRESSION:  - No significant interval change since the previous exam.     09-21-18 NM Pet Skull Base To Mid Thigh   FINDINGS:      HEAD/NECK:  - No FDG hypermetabolic neck adenopathy.  - No FDG hypermetabolic masses.     CHEST:   - No FDG hypermetabolic thoracic adenopathy.  - No FDG hypermetabolic lung nodules or masses.  - Evaluation for tiny parenchymal nodules is somewhat limited on low dose CT secondary to respiratory motion.     ABDOMEN/PELVIS:   - There is hypermetabolic activity in the pancreatic head with a maximum SUV of 4.971.  - Physiologic FDG hypermetabolism seen throughout the GI tract.  - Physiologic FDG hypermetabolism seen throughout the mesentery, retroperitoneum and  pelvis.     BONES:   - There are scattered foci of FDG hypermetabolism most suggestive of degenerative change seen throughout the axial skeleton. If concern persist for metastatic lesions of the bone, HDP Bone scan is recommended for further evaluation.     IMPRESSION:  - Abnormal hypermetabolic activity corresponding to area of indistinctness in the pancreatic head. Maximum SUV is 4.97.    1-20-19 CT CAP with contrast:  Today's study shows mild coronary artery calcifications.     There are bilateral pleural effusions, right greater than left.     There is bibasilar consolidation. I cannot exclude pneumonia.     No parenchymal masses are seen.     IMPRESSION:  Bibasilar effusions and bibasilar consolidation.     There are bibasilar pleural effusions and there is bibasilar  consolidation.     Patient has a percutaneous gastric tube.     There is pneumoperitoneum. This may be from the percutaneous gastric  tube. The tube appears to remain intraluminal but left lateral aspect  could extend beyond the confines of the bowel.     There is a perihepatic fluid collection which contains air and is  concerning for perihepatic abscess.     The liver is homogeneous. There is no evidence of focal hepatic mass     The spleen is homogeneous     There is no peripancreatic stranding or pancreatic head mass.     There is no adrenal enlargement.     The kidneys show no evidence of hydronephrosis or hydroureter. I do not  see any distal ureteral stones.      Small volume ascites is present.     There is no bowel wall thickening or mesenteric stranding.             IMPRESSION:  :   1. Bibasilar effusions and bibasilar consolidation.  2. Loculated perihepatic fluid collection containing air. This is  concerning for an abscess.  3. Percutaneous gastric tube is present. The tip appears to remain  intraluminal. There is, however, single focus of pneumoperitoneum which  may be associated with gastric tube placement.  4. Small volume  ascites.    CT A/P without Contrast 1-24-19:  The percutaneous drain placed on 01/11/2019 and the  perihepatic abscess has been removed. There is persistent loculated  perihepatic fluid although this has markedly improved since prior to  drain placement; the collection did measure 8 x 22 x 9 cm before  drainage as one large collection and now is split into two possibly  separate smaller collections, the more posterior of the two measuring 2  x 10 x 2 cm and the more superior 4 x 6 x 3 cm (the collections may be  narrowly connected).     Percutaneous gastrojejunostomy is in place; status post Whipple. There  is a small amount of low density abdominopelvic free fluid. There is gas  in the nondependent portion of the urinary bladder, probably from recent  instrumentation. There is no evidence of bowel obstruction. Only a tiny  locule of free air persists in the mid abdomen deep to the midline  incision, improved. The kidneys, adrenal glands, pancreas and spleen are  unchanged.     There is mild body wall edema and subcutaneous gas from recent  medication injections. Small volume right and trace volume left pleural  effusions. There is near complete collapse of the right lower lobe but  there is only subsegmental atelectasis at the left lung base. No  pertinent osseous abnormality is seen.     IMPRESSION:  1. Marked improvement in the perihepatic subcapsular abscess. Drainage  catheter has been removed.  2. Unchanged low density free fluid. Unchanged small volume right and  trace left pleural effusions.    CTCAP 02-26-19:  Findings  LUNGS: BIBASILAR LUNG FIBROTIC CHANGE.  HEART: Unremarkable.  PERICARDIUM: No effusion.  MEDIASTINUM: No masses. No enlarged lymph nodes.  No fluid collections.  PLEURA: TINY LEFT PLEURAL EFFUSION.  MAJOR AIRWAYS: Clear. No intrinsic mass.  VASCULATURE: No evidence of aneurysm.     VISUALIZED UPPER ABDOMEN:  LIVER: Homogeneous. No focal hepatic mass or ductal dilatation.  SPLEEN:  Homogeneous. No splenomegaly.  ADRENALS: No mass.  KIDNEYS: No mass. No obstructive uropathy.  No evidence of urolithiasis.  GI TRACT: Non-dilated. No definite wall thickening  PERITONEUM: No free air. No free fluid or loculated fluid collections.  ABDOMINAL WALL: No focal hernia or mass.       OTHER: None.     BONES: No acute bony abnormality.     IMPRESSION:  Now only tiny pleural effusions.    Findings  LOWER THORAX: Clear. No effusions.     ABDOMEN:  LIVER: SUBCAPSULE LIVER COLLECTION MEASURING ABOUT 4.8 CM THAT WAS ABOUT 7.2 CM.  GALLBLADDER: No dilation or stone identified.  PANCREAS: Unremarkable. No mass or ductal dilatation.  SPLEEN: Homogeneous. No splenomegaly.  ADRENALS: No mass.  KIDNEYS: No mass. No obstructive uropathy.  No evidence of urolithiasis.  GI TRACT: Non-dilated. No definite wall thickening.  PERITONEUM: TINY ASCITES.  MESENTERY: Unremarkable.  LYMPH NODES: No lymphadenopathy.  VASCULATURE: No evidence of aneurysm.  ABDOMINAL WALL: No focal hernia or mass.     OTHER: None.     PELVIS:  BLADDER: No focal mass or significant wall thickening  REPRODUCTIVE: Unremarkable as visualized.  APPENDIX: Nondistended. No surrounding inflammation.  BONES: No acute bony abnormality.     IMPRESSION:  Still tiny ascites. Decreased size of a subhepatic collection.      PET/CT 03-29-19:  FINDINGS:      HEAD/NECK:  No FDG hypermetabolic neck adenopathy.  No FDG hypermetabolic masses.     CHEST:   No FDG hypermetabolic thoracic adenopathy.  No FDG hypermetabolic lung nodules or masses.  Evaluation for tiny parenchymal nodules is somewhat limited on low dose  CT secondary to respiratory motion.     ABDOMEN/PELVIS:   Physiologic FDG hypermetabolism seen throughout the solid abdominal  organs.  There is hypermetabolic activity along the anterior margin of the liver.  This appears to correspond to trace subcapsular fluid. The maximum SUV  was 5.385. This is abnormal.  There is a tiny nodular density anterior to the  right of midline in the  peritoneum on image 234 of the axial series. This shows maximum SUV of  3.057. Also hypermetabolic activity to the left of midline adjacent to  surgical clips in the anterior abdomen. This shows a maximum SUV of  4.206.     Physiologic FDG hypermetabolism seen throughout the mesentery,  retroperitoneum and pelvis.     BONES: There are scattered foci of FDG hypermetabolism most suggestive  of degenerative change seen throughout the axial skeleton. If concern  persist for metastatic lesions of the bone, HDP Bone scan is recommended  for further evaluation.     IMPRESSION:     1. Hypermetabolic activity along the anterior margin of the liver which  appears to correspond with a small amount of subcapsular fluid but  concerning for active disease.  2. Tiny nodular density or possibly trace fluid in the peritoneum to the  right of midline as described above.  3. Hypermetabolic activity possibly bowel activity in the lower abdomen  to the left of midline adjacent to the surgical clip on image 247 of the  axial series. All of these areas could represent residual or metastatic  disease.  RECENT LABS:  Lab Results   Component Value Date    WBC 5.20 05/08/2019    HGB 10.3 (L) 05/08/2019    HCT 32.4 (L) 05/08/2019    MCV 87.6 05/08/2019    RDW 21.0 (H) 05/08/2019     05/08/2019    NEUTRORELPCT 56.8 05/08/2019    LYMPHORELPCT 30.4 05/08/2019    MONORELPCT 10.6 05/08/2019    EOSRELPCT 0.6 05/08/2019    BASORELPCT 0.6 05/08/2019    NEUTROABS 2.96 05/08/2019    LYMPHSABS 1.58 05/08/2019       Lab Results   Component Value Date     05/08/2019    K 3.8 05/08/2019    CO2 22.7 05/08/2019     05/08/2019    BUN 15 05/08/2019    CREATININE 1.24 05/08/2019    EGFRIFNONA 58 (L) 05/08/2019    EGFRIFAFRI  09/02/2016      Comment:      <15 Indicative of kidney failure.    GLUCOSE 205 (H) 05/08/2019    CALCIUM 9.8 05/08/2019    ALKPHOS 158 (H) 05/08/2019    AST 32 05/08/2019    ALT 25 05/08/2019     BILITOT 0.3 05/08/2019    ALBUMIN 3.66 05/08/2019    PROTEINTOT 7.4 05/08/2019    MG 2.0 04/24/2019    PHOS 3.3 04/24/2019     Lab Results   Component Value Date    FERRITIN 435.00 (H) 02/20/2019    IRON 11 (L) 02/20/2019    TIBC 196 (L) 02/20/2019    LABIRON 6 (L) 02/20/2019    KGORAPWG41 1,361 (H) 02/20/2019    FOLATE 19.70 02/20/2019       Lab Results   Component Value Date     76.1 (H) 04/23/2019     129.6 (H) 03/20/2019     85 (H) 02/20/2019     34 01/22/2019     104 (H) 01/07/2019     1349 (H) 12/19/2018     1089 (H) 12/10/2018     1576 (H) 11/13/2018     5149 (H) 10/19/2018     7602 (H) 09/25/2018     3636 (H) 09/07/2018     4032 (H) 08/15/2018     ASSESSMENT & PLAN:  Todd Phillips is a very pleasant 68 y.o. male with Stage III moderately differentiated adenocarcinoma of the head of the pancreas with involvement of the duodenum ([id number]), now with evidence of anastomotic recurrence as well as involvement of the liver and rising .    1.  Pancreatic cancer:  -  Initially attempted neoadjuvant chemotherapy with Gemcitabine and Abraxane and he completed one cycle.  He became very weak and had to have TPN support.   -  He underwent surgical resection 12-31-18 and had Stage III disease ([id number]) moderately differentiated adenocarcinoma of the pancreas.  Tumor was 3.7 cm in maximal dimension and 5/15 associated LNs were involved.  Uncinate and SMA margins were involved.    -  There has been question about whether he should take further therapy (chemotherapy and / or radiation), but he had a very slow recovery and wasn't able to take this promptly after surgery.  -   then started to rise, he developed obstructive symptoms and he has PET positivity at the small bowel anastomosis along with abnormal emptying study.  He also has an area of PET uptake along the anterior liver.  -  All of this is concerning for recurrent disease.  -  Recommended  repeat trial of chemotherapy with Gemcitabine / Abraxane.    -  He started Gemcitabine / Abraxane.  He initially seemed to tolerate it well and says the pain he experienced with eating lessened.  Unfortunately, he developed pancytopenia / febrile neutropenia.  Will plan to restart chemotherapy next week but will dose reduce.  Will eliminate D15 treatment, dose reduce D1,D8 by 20% and add growth factor.    2.  SBBO:    - Continues to take Xifaxan and is tolerating it well. Being followed by Dr. Su.     3.  N/V/Dehydration:  - He is continuing to struggle with this.  He is using Zofran TID and compazine as needed.  He has been getting 1L NS twice a week.    -  Will increase IVF to 3x/week to see if that will help with dehydration and will get his chemotherapy restarted as this seemed to be helping nausea/abdominal pain / GI motiility.     4.  Neoplasm related pain:  - Continue Oxycodone PRN.  Encouraged him to liberalize use to help control his symptoms.    5.  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  He previously took oral iron but did not absorb it.    - He received Feraheme, completed on 4/3. Will recheck iron panel and ferritin with his next visit.     6.  Constipation:.  - Currently denies. Continue Senna/Colace and Miralax prn.      7.   History of Atrial fibrillation:  Chronically anticoagulated on Eliquis.      8. Gout of right great toe:   -He is taking Allopurinol 100 mg daily. He completed course of prednisone taper which was helpful symptoms have resolved. Will work on hydration as above.  Will continue Allopurinol but increase dose to 200 mg daily. Will monitor.     9.  Prophylaxis:  Has had 2018 influenza vaccine.      10.  ACO Quality measures  - Todd Phillips does not use tobacco products.  -- Todd Phillips received 2018 flu vaccine.  - Current outpatient and discharge medications have been reconciled for the patient.  Reviewed by: Gwen Alves MD    11. Follow up: Will have him  see VIMAL Jackman when he returns to start treatment next week.  I will see him when he returns for D8 treatment.        This note was scribed for Gwen Alves MD by Tamara Morejon RN.    I, Gwen Alves MD, personally performed the services described in this documentation as scribed by the above named individual in my presence, and it is both accurate and complete.  05/08/2019     I spent 30 minutes with Todd Phillips today.  More than 50% of the time was spent in counseling / coordination of care for the above problems.    Electronically Signed by: Gwen Alves MD      CC:   MD Miquel Quintana MD Aaron House, MD Michael Simons, MD

## 2019-05-09 ENCOUNTER — DOCUMENTATION (OUTPATIENT)
Dept: ONCOLOGY | Facility: HOSPITAL | Age: 69
End: 2019-05-09

## 2019-05-09 NOTE — PROGRESS NOTES
Tamara Morejon, RN  Jackie Bill   Cc: Gwen Alves MD             This patient is already receiving IVF at home with [facility] on Monday and Friday but Dr. Alves wants him to have it MWF instead to keep him from getting dehydrated     SS contacted [facility] [phone] per Katherin Wednesday, May 8 to arrange IVF for MWF.  SS faxed [phone] face sheet, MD order, and dictation notes.  SS will follow.

## 2019-05-10 ENCOUNTER — READMISSION MANAGEMENT (OUTPATIENT)
Dept: CALL CENTER | Facility: HOSPITAL | Age: 69
End: 2019-05-10

## 2019-05-12 RX ORDER — PACLITAXEL 100 MG/20ML
100 INJECTION, POWDER, LYOPHILIZED, FOR SUSPENSION INTRAVENOUS ONCE
Status: CANCELLED | OUTPATIENT
Start: 2019-05-13

## 2019-05-12 RX ORDER — PACLITAXEL 100 MG/20ML
100 INJECTION, POWDER, LYOPHILIZED, FOR SUSPENSION INTRAVENOUS ONCE
Status: CANCELLED | OUTPATIENT
Start: 2019-05-20

## 2019-05-13 ENCOUNTER — LAB (OUTPATIENT)
Dept: ONCOLOGY | Facility: CLINIC | Age: 69
End: 2019-05-13

## 2019-05-13 ENCOUNTER — INFUSION (OUTPATIENT)
Dept: ONCOLOGY | Facility: HOSPITAL | Age: 69
End: 2019-05-13
Attending: INTERNAL MEDICINE

## 2019-05-13 VITALS
TEMPERATURE: 98.3 F | RESPIRATION RATE: 20 BRPM | SYSTOLIC BLOOD PRESSURE: 124 MMHG | DIASTOLIC BLOOD PRESSURE: 75 MMHG | OXYGEN SATURATION: 93 % | BODY MASS INDEX: 23.08 KG/M2 | HEART RATE: 74 BPM | WEIGHT: 170.2 LBS

## 2019-05-13 DIAGNOSIS — C25.0 MALIGNANT NEOPLASM OF HEAD OF PANCREAS (HCC): Primary | ICD-10-CM

## 2019-05-13 LAB
ALBUMIN SERPL-MCNC: 3.56 G/DL (ref 3.5–5.2)
ALBUMIN/GLOB SERPL: 1.1 G/DL
ALP SERPL-CCNC: 128 U/L (ref 39–117)
ALT SERPL W P-5'-P-CCNC: 19 U/L (ref 1–41)
ANION GAP SERPL CALCULATED.3IONS-SCNC: 11 MMOL/L
AST SERPL-CCNC: 24 U/L (ref 1–40)
BASOPHILS # BLD AUTO: 0.03 10*3/MM3 (ref 0–0.2)
BASOPHILS NFR BLD AUTO: 0.4 % (ref 0–1.5)
BILIRUB SERPL-MCNC: 0.4 MG/DL (ref 0.2–1.2)
BUN BLD-MCNC: 15 MG/DL (ref 8–23)
BUN/CREAT SERPL: 13.4 (ref 7–25)
CALCIUM SPEC-SCNC: 9.5 MG/DL (ref 8.6–10.5)
CHLORIDE SERPL-SCNC: 107 MMOL/L (ref 98–107)
CO2 SERPL-SCNC: 23 MMOL/L (ref 22–29)
CREAT BLD-MCNC: 1.12 MG/DL (ref 0.76–1.27)
DEPRECATED RDW RBC AUTO: 65.8 FL (ref 37–54)
EOSINOPHIL # BLD AUTO: 0.07 10*3/MM3 (ref 0–0.4)
EOSINOPHIL NFR BLD AUTO: 1 % (ref 0.3–6.2)
ERYTHROCYTE [DISTWIDTH] IN BLOOD BY AUTOMATED COUNT: 20.6 % (ref 12.3–15.4)
GFR SERPL CREATININE-BSD FRML MDRD: 65 ML/MIN/1.73
GLOBULIN UR ELPH-MCNC: 3.2 GM/DL
GLUCOSE BLD-MCNC: 184 MG/DL (ref 65–99)
HCT VFR BLD AUTO: 31 % (ref 37.5–51)
HGB BLD-MCNC: 9.9 G/DL (ref 13–17.7)
IMM GRANULOCYTES # BLD AUTO: 0.05 10*3/MM3 (ref 0–0.05)
IMM GRANULOCYTES NFR BLD AUTO: 0.7 % (ref 0–0.5)
LYMPHOCYTES # BLD AUTO: 1.15 10*3/MM3 (ref 0.7–3.1)
LYMPHOCYTES NFR BLD AUTO: 17.1 % (ref 19.6–45.3)
MCH RBC QN AUTO: 28 PG (ref 26.6–33)
MCHC RBC AUTO-ENTMCNC: 31.9 G/DL (ref 31.5–35.7)
MCV RBC AUTO: 87.6 FL (ref 79–97)
MONOCYTES # BLD AUTO: 0.6 10*3/MM3 (ref 0.1–0.9)
MONOCYTES NFR BLD AUTO: 8.9 % (ref 5–12)
NEUTROPHILS # BLD AUTO: 4.82 10*3/MM3 (ref 1.7–7)
NEUTROPHILS NFR BLD AUTO: 71.9 % (ref 42.7–76)
PLATELET # BLD AUTO: 163 10*3/MM3 (ref 140–450)
PMV BLD AUTO: 9.9 FL (ref 6–12)
POTASSIUM BLD-SCNC: 3.8 MMOL/L (ref 3.5–5.2)
PROT SERPL-MCNC: 6.8 G/DL (ref 6–8.5)
RBC # BLD AUTO: 3.54 10*6/MM3 (ref 4.14–5.8)
SODIUM BLD-SCNC: 141 MMOL/L (ref 136–145)
WBC NRBC COR # BLD: 6.72 10*3/MM3 (ref 3.4–10.8)

## 2019-05-13 PROCEDURE — 25010000002 PACLITAXEL PROTEIN-BOUND PART PER 1 MG: Performed by: INTERNAL MEDICINE

## 2019-05-13 PROCEDURE — 96367 TX/PROPH/DG ADDL SEQ IV INF: CPT

## 2019-05-13 PROCEDURE — 25010000002 FOSAPREPITANT PER 1 MG: Performed by: INTERNAL MEDICINE

## 2019-05-13 PROCEDURE — 96375 TX/PRO/DX INJ NEW DRUG ADDON: CPT

## 2019-05-13 PROCEDURE — 85025 COMPLETE CBC W/AUTO DIFF WBC: CPT | Performed by: INTERNAL MEDICINE

## 2019-05-13 PROCEDURE — 25010000002 GEMCITABINE HCL 2 GM/20ML SOLUTION 20 ML VIAL: Performed by: INTERNAL MEDICINE

## 2019-05-13 PROCEDURE — 96413 CHEMO IV INFUSION 1 HR: CPT

## 2019-05-13 PROCEDURE — 25010000002 DEXAMETHASONE SODIUM PHOSPHATE 20 MG/5ML SOLUTION 5 ML VIAL: Performed by: INTERNAL MEDICINE

## 2019-05-13 PROCEDURE — 80053 COMPREHEN METABOLIC PANEL: CPT | Performed by: INTERNAL MEDICINE

## 2019-05-13 PROCEDURE — 96417 CHEMO IV INFUS EACH ADDL SEQ: CPT

## 2019-05-13 RX ORDER — SODIUM CHLORIDE 9 MG/ML
250 INJECTION, SOLUTION INTRAVENOUS ONCE
Status: COMPLETED | OUTPATIENT
Start: 2019-05-13 | End: 2019-05-13

## 2019-05-13 RX ORDER — SODIUM CHLORIDE 0.9 % (FLUSH) 0.9 %
10 SYRINGE (ML) INJECTION AS NEEDED
Status: CANCELLED | OUTPATIENT
Start: 2019-05-20

## 2019-05-13 RX ORDER — PACLITAXEL 100 MG/20ML
200 INJECTION, POWDER, LYOPHILIZED, FOR SUSPENSION INTRAVENOUS ONCE
Status: COMPLETED | OUTPATIENT
Start: 2019-05-13 | End: 2019-05-13

## 2019-05-13 RX ADMIN — SODIUM CHLORIDE 250 ML: 9 INJECTION, SOLUTION INTRAVENOUS at 11:55

## 2019-05-13 RX ADMIN — GEMCITABINE 1600 MG: 100 INJECTION, SOLUTION INTRAVENOUS at 13:10

## 2019-05-13 RX ADMIN — PACLITAXEL 200 MG: 100 INJECTION, POWDER, LYOPHILIZED, FOR SUSPENSION INTRAVENOUS at 12:40

## 2019-05-13 RX ADMIN — SODIUM CHLORIDE 150 MG: 9 INJECTION, SOLUTION INTRAVENOUS at 12:10

## 2019-05-13 RX ADMIN — DEXAMETHASONE SODIUM PHOSPHATE 12 MG: 4 INJECTION, SOLUTION INTRAMUSCULAR; INTRAVENOUS at 11:55

## 2019-05-13 RX ADMIN — SODIUM CHLORIDE, PRESERVATIVE FREE 500 UNITS: 5 INJECTION INTRAVENOUS at 13:57

## 2019-05-17 ENCOUNTER — READMISSION MANAGEMENT (OUTPATIENT)
Dept: CALL CENTER | Facility: HOSPITAL | Age: 69
End: 2019-05-17

## 2019-05-17 NOTE — OUTREACH NOTE
Medical Week 4 Survey      Responses   Facility patient discharged from?  [facility]   Does the patient have one of the following disease processes/diagnoses(primary or secondary)?  Other   Week 4 attempt successful?  Yes   Call start time  0944   Call end time  0947   Discharge diagnosis  Febrile neutopenia   Meds reviewed with patient/caregiver?  Yes   Is the patient having any side effects they believe may be caused by any medication additions or changes?  No   Is the patient taking all medications as directed (includes completed medication regime)?  Yes   Medication comments  Completed antibiotics   Has the patient kept scheduled appointments due by today?  Yes   Is the patient still receiving Home Health Services?  Yes   Home health comments  [facility]   Psychosocial issues?  No   What is the patient's perception of their health status since discharge?  Improving   Is the patient/caregiver able to teach back the hierarchy of who to call/visit for symptoms/problems? PCP, Specialist, Home health nurse, Urgent Care, ED, 911  Yes   Week 4 Call Completed?  Yes   Would the patient like one additional call?  No   Wrap up additional comments  Aware of Nurse Call Center          Kasie Steward RN

## 2019-05-20 ENCOUNTER — INFUSION (OUTPATIENT)
Dept: ONCOLOGY | Facility: HOSPITAL | Age: 69
End: 2019-05-20
Attending: INTERNAL MEDICINE

## 2019-05-20 ENCOUNTER — LAB (OUTPATIENT)
Dept: ONCOLOGY | Facility: CLINIC | Age: 69
End: 2019-05-20

## 2019-05-20 VITALS
HEART RATE: 93 BPM | BODY MASS INDEX: 23.1 KG/M2 | SYSTOLIC BLOOD PRESSURE: 107 MMHG | DIASTOLIC BLOOD PRESSURE: 71 MMHG | RESPIRATION RATE: 18 BRPM | WEIGHT: 170.3 LBS | OXYGEN SATURATION: 96 % | TEMPERATURE: 98 F

## 2019-05-20 DIAGNOSIS — C25.0 MALIGNANT NEOPLASM OF HEAD OF PANCREAS (HCC): ICD-10-CM

## 2019-05-20 DIAGNOSIS — E86.0 DEHYDRATION: ICD-10-CM

## 2019-05-20 DIAGNOSIS — C25.0 MALIGNANT NEOPLASM OF HEAD OF PANCREAS (HCC): Primary | ICD-10-CM

## 2019-05-20 DIAGNOSIS — R11.0 NAUSEA: ICD-10-CM

## 2019-05-20 LAB
ALBUMIN SERPL-MCNC: 3.66 G/DL (ref 3.5–5.2)
ALBUMIN/GLOB SERPL: 1.2 G/DL
ALP SERPL-CCNC: 125 U/L (ref 39–117)
ALT SERPL W P-5'-P-CCNC: 33 U/L (ref 1–41)
ANION GAP SERPL CALCULATED.3IONS-SCNC: 12.7 MMOL/L
AST SERPL-CCNC: 39 U/L (ref 1–40)
BASOPHILS # BLD AUTO: 0 10*3/MM3 (ref 0–0.2)
BASOPHILS NFR BLD AUTO: 0 % (ref 0–1.5)
BILIRUB SERPL-MCNC: 0.2 MG/DL (ref 0.2–1.2)
BUN BLD-MCNC: 19 MG/DL (ref 8–23)
BUN/CREAT SERPL: 17.8 (ref 7–25)
CALCIUM SPEC-SCNC: 9.3 MG/DL (ref 8.6–10.5)
CHLORIDE SERPL-SCNC: 105 MMOL/L (ref 98–107)
CO2 SERPL-SCNC: 23.3 MMOL/L (ref 22–29)
CREAT BLD-MCNC: 1.07 MG/DL (ref 0.76–1.27)
DEPRECATED RDW RBC AUTO: 59.2 FL (ref 37–54)
EOSINOPHIL # BLD AUTO: 0.01 10*3/MM3 (ref 0–0.4)
EOSINOPHIL NFR BLD AUTO: 0.2 % (ref 0.3–6.2)
ERYTHROCYTE [DISTWIDTH] IN BLOOD BY AUTOMATED COUNT: 18.6 % (ref 12.3–15.4)
GFR SERPL CREATININE-BSD FRML MDRD: 68 ML/MIN/1.73
GLOBULIN UR ELPH-MCNC: 2.9 GM/DL
GLUCOSE BLD-MCNC: 206 MG/DL (ref 65–99)
HCT VFR BLD AUTO: 29.3 % (ref 37.5–51)
HGB BLD-MCNC: 9.1 G/DL (ref 13–17.7)
IMM GRANULOCYTES # BLD AUTO: 0.06 10*3/MM3 (ref 0–0.05)
IMM GRANULOCYTES NFR BLD AUTO: 1.4 % (ref 0–0.5)
LYMPHOCYTES # BLD AUTO: 1.1 10*3/MM3 (ref 0.7–3.1)
LYMPHOCYTES NFR BLD AUTO: 26.3 % (ref 19.6–45.3)
MCH RBC QN AUTO: 27.7 PG (ref 26.6–33)
MCHC RBC AUTO-ENTMCNC: 31.1 G/DL (ref 31.5–35.7)
MCV RBC AUTO: 89.3 FL (ref 79–97)
MONOCYTES # BLD AUTO: 0.23 10*3/MM3 (ref 0.1–0.9)
MONOCYTES NFR BLD AUTO: 5.5 % (ref 5–12)
NEUTROPHILS # BLD AUTO: 2.79 10*3/MM3 (ref 1.7–7)
NEUTROPHILS NFR BLD AUTO: 66.6 % (ref 42.7–76)
PLATELET # BLD AUTO: 122 10*3/MM3 (ref 140–450)
PMV BLD AUTO: 11.1 FL (ref 6–12)
POTASSIUM BLD-SCNC: 3.6 MMOL/L (ref 3.5–5.2)
PROT SERPL-MCNC: 6.6 G/DL (ref 6–8.5)
RBC # BLD AUTO: 3.28 10*6/MM3 (ref 4.14–5.8)
SODIUM BLD-SCNC: 141 MMOL/L (ref 136–145)
WBC NRBC COR # BLD: 4.19 10*3/MM3 (ref 3.4–10.8)

## 2019-05-20 PROCEDURE — 25010000002 FOSAPREPITANT PER 1 MG: Performed by: INTERNAL MEDICINE

## 2019-05-20 PROCEDURE — 96413 CHEMO IV INFUSION 1 HR: CPT

## 2019-05-20 PROCEDURE — 25010000002 GEMCITABINE HCL 2 GM/20ML SOLUTION 20 ML VIAL: Performed by: INTERNAL MEDICINE

## 2019-05-20 PROCEDURE — 85025 COMPLETE CBC W/AUTO DIFF WBC: CPT | Performed by: INTERNAL MEDICINE

## 2019-05-20 PROCEDURE — 25010000002 DEXAMETHASONE SODIUM PHOSPHATE 20 MG/5ML SOLUTION 5 ML VIAL: Performed by: INTERNAL MEDICINE

## 2019-05-20 PROCEDURE — 96417 CHEMO IV INFUS EACH ADDL SEQ: CPT

## 2019-05-20 PROCEDURE — 96367 TX/PROPH/DG ADDL SEQ IV INF: CPT

## 2019-05-20 PROCEDURE — 96372 THER/PROPH/DIAG INJ SC/IM: CPT

## 2019-05-20 PROCEDURE — 80053 COMPREHEN METABOLIC PANEL: CPT | Performed by: INTERNAL MEDICINE

## 2019-05-20 PROCEDURE — 96377 APPLICATON ON-BODY INJECTOR: CPT

## 2019-05-20 PROCEDURE — 96375 TX/PRO/DX INJ NEW DRUG ADDON: CPT

## 2019-05-20 PROCEDURE — 25010000002 PACLITAXEL PROTEIN-BOUND PART PER 1 MG: Performed by: INTERNAL MEDICINE

## 2019-05-20 PROCEDURE — 25010000002 PEGFILGRASTIM 6 MG/0.6ML PREFILLED SYRINGE KIT: Performed by: INTERNAL MEDICINE

## 2019-05-20 RX ORDER — PACLITAXEL 100 MG/20ML
200 INJECTION, POWDER, LYOPHILIZED, FOR SUSPENSION INTRAVENOUS ONCE
Status: COMPLETED | OUTPATIENT
Start: 2019-05-20 | End: 2019-05-20

## 2019-05-20 RX ORDER — SODIUM CHLORIDE 9 MG/ML
500 INJECTION, SOLUTION INTRAVENOUS ONCE
Status: COMPLETED | OUTPATIENT
Start: 2019-05-20 | End: 2019-05-20

## 2019-05-20 RX ORDER — SODIUM CHLORIDE 9 MG/ML
INJECTION, SOLUTION INTRAVENOUS
Status: COMPLETED
Start: 2019-05-20 | End: 2019-05-20

## 2019-05-20 RX ORDER — SODIUM CHLORIDE 0.9 % (FLUSH) 0.9 %
10 SYRINGE (ML) INJECTION AS NEEDED
Status: DISCONTINUED | OUTPATIENT
Start: 2019-05-20 | End: 2019-05-20 | Stop reason: HOSPADM

## 2019-05-20 RX ORDER — SODIUM CHLORIDE 0.9 % (FLUSH) 0.9 %
10 SYRINGE (ML) INJECTION AS NEEDED
Status: CANCELLED | OUTPATIENT
Start: 2019-06-10

## 2019-05-20 RX ADMIN — SODIUM CHLORIDE 500 ML: 9 INJECTION, SOLUTION INTRAVENOUS at 12:50

## 2019-05-20 RX ADMIN — PACLITAXEL 200 MG: 100 INJECTION, POWDER, LYOPHILIZED, FOR SUSPENSION INTRAVENOUS at 13:47

## 2019-05-20 RX ADMIN — DEXAMETHASONE SODIUM PHOSPHATE 12 MG: 4 INJECTION, SOLUTION INTRAMUSCULAR; INTRAVENOUS at 12:50

## 2019-05-20 RX ADMIN — SODIUM CHLORIDE, PRESERVATIVE FREE 500 UNITS: 5 INJECTION INTRAVENOUS at 15:25

## 2019-05-20 RX ADMIN — GEMCITABINE 1600 MG: 100 INJECTION, SOLUTION INTRAVENOUS at 14:32

## 2019-05-20 RX ADMIN — PEGFILGRASTIM 6 MG: KIT SUBCUTANEOUS at 15:22

## 2019-05-20 RX ADMIN — SODIUM CHLORIDE 150 MG: 9 INJECTION, SOLUTION INTRAVENOUS at 13:07

## 2019-05-20 RX ADMIN — SODIUM CHLORIDE, PRESERVATIVE FREE 10 ML: 5 INJECTION INTRAVENOUS at 15:24

## 2019-05-21 ENCOUNTER — TELEPHONE (OUTPATIENT)
Dept: ONCOLOGY | Facility: HOSPITAL | Age: 69
End: 2019-05-21

## 2019-05-22 NOTE — TELEPHONE ENCOUNTER
"Called to patient, to check on him after yesterday's Day 8 chemo treatment.  Spoke w/ pt's wife Emmy.  Per her, pt is doing well today, \"just weak & tired\".  Informed pt's wife, that after checking with Dr. Alves, there are further instructions for patient to know regarding treatment. 1) Pt is encouraged to take claritin OTC, 10mg daily, for the week after receiving neulasta OBI (there's is no conflict for pt to take claritin & singulair as he is already prescribed to take).  2) Pt requested possible additional dose reduction for chemo treatment, due to having increased fatigue & weakness after his Day 1 of previous cycle; Dr. Alves is ordering for pt to have an additional 10% reduction of gemzar & abraxane doses (for a total reduction of 30% of original dose).  3) Dr. Alves approved for pt to have next Day 1 of treatment to be pushed out to 6/10/19 at 10:30am, so that patient could attend son's wedding on 6/7.  Pt is encouraged to call onc clinic with any concerns or needs prior to that day.  All information/instruction reviewed with patient's wife.  Wife verbalized understanding, and is in agreement with plan of care.   "

## 2019-05-28 ENCOUNTER — LAB (OUTPATIENT)
Dept: ONCOLOGY | Facility: CLINIC | Age: 69
End: 2019-05-28

## 2019-05-28 ENCOUNTER — OFFICE VISIT (OUTPATIENT)
Dept: ONCOLOGY | Facility: CLINIC | Age: 69
End: 2019-05-28

## 2019-05-28 ENCOUNTER — APPOINTMENT (OUTPATIENT)
Dept: ONCOLOGY | Facility: HOSPITAL | Age: 69
End: 2019-05-28
Attending: INTERNAL MEDICINE

## 2019-05-28 VITALS
OXYGEN SATURATION: 94 % | TEMPERATURE: 97.9 F | BODY MASS INDEX: 22.87 KG/M2 | RESPIRATION RATE: 18 BRPM | SYSTOLIC BLOOD PRESSURE: 89 MMHG | WEIGHT: 168.6 LBS | HEART RATE: 88 BPM | DIASTOLIC BLOOD PRESSURE: 60 MMHG

## 2019-05-28 DIAGNOSIS — R52 UNCONTROLLED PAIN: ICD-10-CM

## 2019-05-28 DIAGNOSIS — C25.0 MALIGNANT NEOPLASM OF HEAD OF PANCREAS (HCC): ICD-10-CM

## 2019-05-28 DIAGNOSIS — M54.50 ACUTE RIGHT-SIDED LOW BACK PAIN WITHOUT SCIATICA: Primary | ICD-10-CM

## 2019-05-28 DIAGNOSIS — C25.0 MALIGNANT NEOPLASM OF HEAD OF PANCREAS (HCC): Primary | ICD-10-CM

## 2019-05-28 DIAGNOSIS — G89.3 NEOPLASM RELATED PAIN: ICD-10-CM

## 2019-05-28 LAB
ALBUMIN SERPL-MCNC: 3.44 G/DL (ref 3.5–5.2)
ALBUMIN/GLOB SERPL: 1.2 G/DL
ALP SERPL-CCNC: 192 U/L (ref 39–117)
ALT SERPL W P-5'-P-CCNC: 34 U/L (ref 1–41)
ANION GAP SERPL CALCULATED.3IONS-SCNC: 10.2 MMOL/L
ANISOCYTOSIS BLD QL: ABNORMAL
AST SERPL-CCNC: 22 U/L (ref 1–40)
BILIRUB SERPL-MCNC: 0.3 MG/DL (ref 0.2–1.2)
BILIRUB UR QL STRIP: NEGATIVE
BUN BLD-MCNC: 21 MG/DL (ref 8–23)
BUN/CREAT SERPL: 17.8 (ref 7–25)
CALCIUM SPEC-SCNC: 9.1 MG/DL (ref 8.6–10.5)
CHLORIDE SERPL-SCNC: 104 MMOL/L (ref 98–107)
CLARITY UR: CLEAR
CO2 SERPL-SCNC: 24.8 MMOL/L (ref 22–29)
COLOR UR: YELLOW
CREAT BLD-MCNC: 1.18 MG/DL (ref 0.76–1.27)
DEPRECATED RDW RBC AUTO: 59.7 FL (ref 37–54)
ERYTHROCYTE [DISTWIDTH] IN BLOOD BY AUTOMATED COUNT: 19.3 % (ref 12.3–15.4)
GFR SERPL CREATININE-BSD FRML MDRD: 61 ML/MIN/1.73
GLOBULIN UR ELPH-MCNC: 2.9 GM/DL
GLUCOSE BLD-MCNC: 182 MG/DL (ref 65–99)
GLUCOSE UR STRIP-MCNC: NEGATIVE MG/DL
HCT VFR BLD AUTO: 28.9 % (ref 37.5–51)
HGB BLD-MCNC: 8.9 G/DL (ref 13–17.7)
HGB UR QL STRIP.AUTO: NEGATIVE
HYPOCHROMIA BLD QL: ABNORMAL
KETONES UR QL STRIP: NEGATIVE
LEUKOCYTE ESTERASE UR QL STRIP.AUTO: NEGATIVE
LYMPHOCYTES # BLD MANUAL: 1.69 10*3/MM3 (ref 0.7–3.1)
LYMPHOCYTES NFR BLD MANUAL: 12 % (ref 19.6–45.3)
LYMPHOCYTES NFR BLD MANUAL: 4 % (ref 5–12)
MCH RBC QN AUTO: 27.9 PG (ref 26.6–33)
MCHC RBC AUTO-ENTMCNC: 30.8 G/DL (ref 31.5–35.7)
MCV RBC AUTO: 90.6 FL (ref 79–97)
METAMYELOCYTES NFR BLD MANUAL: 1 % (ref 0–0)
MONOCYTES # BLD AUTO: 0.56 10*3/MM3 (ref 0.1–0.9)
NEUTROPHILS # BLD AUTO: 11.7 10*3/MM3 (ref 1.7–7)
NEUTROPHILS NFR BLD MANUAL: 72 % (ref 42.7–76)
NEUTS BAND NFR BLD MANUAL: 11 % (ref 0–5)
NITRITE UR QL STRIP: NEGATIVE
OVALOCYTES BLD QL SMEAR: ABNORMAL
PH UR STRIP.AUTO: 5.5 [PH] (ref 5–8)
PLATELET # BLD AUTO: 81 10*3/MM3 (ref 140–450)
PMV BLD AUTO: 10.7 FL (ref 6–12)
POTASSIUM BLD-SCNC: 4 MMOL/L (ref 3.5–5.2)
PROT SERPL-MCNC: 6.3 G/DL (ref 6–8.5)
PROT UR QL STRIP: NEGATIVE
RBC # BLD AUTO: 3.19 10*6/MM3 (ref 4.14–5.8)
SCAN SLIDE: NORMAL
SMALL PLATELETS BLD QL SMEAR: ABNORMAL
SODIUM BLD-SCNC: 139 MMOL/L (ref 136–145)
SP GR UR STRIP: 1.02 (ref 1–1.03)
UROBILINOGEN UR QL STRIP: NORMAL
WBC NRBC COR # BLD: 14.1 10*3/MM3 (ref 3.4–10.8)

## 2019-05-28 PROCEDURE — 99214 OFFICE O/P EST MOD 30 MIN: CPT | Performed by: NURSE PRACTITIONER

## 2019-05-28 PROCEDURE — 81003 URINALYSIS AUTO W/O SCOPE: CPT | Performed by: INTERNAL MEDICINE

## 2019-05-28 PROCEDURE — 80053 COMPREHEN METABOLIC PANEL: CPT | Performed by: INTERNAL MEDICINE

## 2019-05-28 PROCEDURE — 85025 COMPLETE CBC W/AUTO DIFF WBC: CPT | Performed by: INTERNAL MEDICINE

## 2019-05-28 PROCEDURE — 85007 BL SMEAR W/DIFF WBC COUNT: CPT | Performed by: INTERNAL MEDICINE

## 2019-05-28 RX ORDER — OXYCODONE HYDROCHLORIDE 10 MG/1
TABLET ORAL
Qty: 100 TABLET | Refills: 0 | Status: SHIPPED | OUTPATIENT
Start: 2019-05-28 | End: 2019-07-01 | Stop reason: SDUPTHER

## 2019-05-28 NOTE — PROGRESS NOTES
DATE:  5/28/2019    DIAGNOSIS:  1.   Stage III moderately differentiated adenocarcinoma of the head of the pancreas with involvement of the duodenum ([id number]).    CHIEF COMPLAINT:  Worsening right lower back pain    Interval History:  Mr. Phillips presents today for a sick visit. He was recently restarted on treatment with with Gemcitabine/Abraxane with reduced dose of 20% and D15 was eliminated given difficulty with cytopenias. With treatment, he reports having increasing fatigue and weakness. Therefore, planned to reduce dose further to 30% with next treatment (scheduled for 6/10/19 so patient can attend his son's wedding). Last Friday, he reports developing right lower back pain which he describes as being constant, sharp and worsening over the past few days. He says he had to start taking oxycodone prn (1-2 tabs every 4 hours ) and this has not been controlling his pain. At present, he rates his pain as  7/10. He has also been taking Claritin BID to help with bony pain related to neulasta which he received on 5/20. He denies having any fevers/chills. Denies hematuria or dysuria. Denies any lower extremity weakness/numbness. He reports he has been eating and drinking well. He reports having mild nausea (controlled with zofran prn). He continues to receive IVF with home health. He denies any other complaints today.     PAST MEDICAL HISTORY:  Past Medical History:   Diagnosis Date   • Antral gastritis    • Asthma    • Atrial fibrillation (CMS/HCC)     treated with oral blood thinner   • Chest pain    • COPD (chronic obstructive pulmonary disease) (CMS/HCC)    • Coronary artery disease     no stents   • Diabetes mellitus (CMS/HCC)     type 2 - checks sugar 4 times per day    • Duodenitis    • Elevated cholesterol    • Emphysema of lung (CMS/HCC)    • Gallbladder disease     removed    • GERD (gastroesophageal reflux disease)    • Hard to intubate     busted lip last time   • Hyperlipidemia    • Hypertension    •  Jaundice    • Kidney stone    • Kidney stones     had lithotripsy and passed 36 kidney stones as well as had one surgically removed.   • Malignant neoplasm of head of pancreas (CMS/HCC) 9/5/2018   • Nausea    • Obstructive sleep apnea on CPAP     compliant with machine    • Palpitations    • Pneumonia 08/2018   • Reflux esophagitis    • Renal failure     stage 3 kidney disease   • Sepsis (CMS/HCC)        PAST SURGICAL HISTORY:  Past Surgical History:   Procedure Laterality Date   • BILE DUCT STENT PLACEMENT      [facility] 2 weeks ago    • CARDIAC CATHETERIZATION  11/01/1999   • CARDIOVASCULAR STRESS TEST  09/2012   • CHOLECYSTECTOMY N/A 8/10/2018    Procedure: CHOLECYSTECTOMY LAPAROSCOPIC;  Surgeon: Ronak Downs MD;  Location: [facility] OR;  Service: General   • COLONOSCOPY     • CYSTOSCOPY BLADDER STONE LITHOTRIPSY     • ECHO - CONVERTED  09/2012   • ERCP N/A 8/14/2018    Procedure: ENDOSCOPIC RETROGRADE CHOLANGIOPANCREATOGRAPHY WITH PAPILLOTOMY;  Surgeon: Scot Su MD;  Location:  [facility] OR;  Service: Gastroenterology   • ERCP N/A 10/1/2018    Procedure: ENDOSCOPIC RETROGRADE CHOLANGIOPANCREATOGRAPHY;  Surgeon: Jefry Luna MD;  Location:  [facility];  Service: Gastroenterology   • KIDNEY STONE SURGERY     • KIDNEY STONE SURGERY      open abdominal surgery   • PORTACATH PLACEMENT Right 10/23/2018    Procedure: INSERTION OF PORTACATH;  Surgeon: Ronak Downs MD;  Location: [facility] OR;  Service: General   • TONSILLECTOMY     • UPPER GASTROINTESTINAL ENDOSCOPY  08/30/2012   • WHIPPLE PROCEDURE N/A 12/31/2018    Procedure: WHIPPLE PROCEDURE, BIOPSY OF LIVER, PORTAL VEIN RESECTION AND REPAIR, G/J TUBE PLACEMENT;  Surgeon: Miquel Brody MD;  Location:  [facility] OR;  Service: General       SOCIAL HISTORY:  Social History     Socioeconomic History   • Marital status:      Spouse name: Not on file   • Number of children: Not on file   • Years of education:  Not on file   • Highest education level: Not on file   Tobacco Use   • Smoking status: Never Smoker   • Smokeless tobacco: Never Used   Substance and Sexual Activity   • Alcohol use: No   • Drug use: No   • Sexual activity: Defer     Partners: Female   Social History Narrative    He is  and lives alone with his wife although they have 3 children together who all live close by. He is retired from the [employer] and was exposed to Agent Orange during [place]. He is a lifelong nonsmoker.        FAMILY HISTORY:  Family History   Problem Relation Age of Onset   • Heart attack Mother    • Heart disease Mother    • Stroke Mother    • Kidney disease Mother    • Heart failure Mother    • Lung disease Father    • Tuberculosis Father    • Diabetes Sister    • Heart attack Brother    • Heart failure Brother    • Heart disease Brother    • Diabetes Sister    • No Known Problems Brother    • No Known Problems Brother          MEDICATIONS:  The current medication list was reviewed in the EMR    Current Outpatient Medications:   •  albuterol (PROVENTIL HFA;VENTOLIN HFA) 108 (90 BASE) MCG/ACT inhaler, Inhale 2 puffs Every 4 (Four) Hours As Needed for Wheezing or Shortness of Air., Disp: , Rfl:   •  albuterol (PROVENTIL) (2.5 MG/3ML) 0.083% nebulizer solution, Take 2.5 mg by nebulization Every 6 (Six) Hours As Needed for Wheezing or Shortness of Air., Disp: , Rfl:   •  allopurinol (ZYLOPRIM) 100 MG tablet, Take 1 tablet by mouth Daily., Disp: 90 tablet, Rfl: 0  •  amoxicillin-clavulanate (AUGMENTIN) 500-125 MG per tablet, Take 1 tablet by mouth Daily With Breakfast & Dinner., Disp: 6 tablet, Rfl: 0  •  apixaban (ELIQUIS) 5 MG tablet tablet, Take 1 tablet by mouth Every 12 (Twelve) Hours., Disp: 60 tablet, Rfl:   •  cholecalciferol (VITAMIN D3) 1000 units tablet, Take 1,000 Units by mouth Daily., Disp: , Rfl:   •  flecainide (TAMBOCOR) 50 MG tablet, Take 50 mg by mouth Every 12 (Twelve) Hours., Disp: , Rfl:   •  FLUoxetine  (PROzac) 20 MG capsule, Take 20 mg by mouth 2 (Two) Times a Day., Disp: , Rfl:   •  insulin lispro (humaLOG) 100 UNIT/ML injection, Inject  under the skin into the appropriate area as directed 3 (Three) Times a Day As Needed (before meals prn (2 units if > 150-199, 4 units if > 199))., Disp: , Rfl:   •  lactobacillus acidophilus (RISAQUAD) capsule capsule, Take 1 capsule by mouth Daily., Disp: 30 capsule, Rfl: 0  •  mometasone (ASMANEX) 220 MCG/INH inhaler, Inhale 2 puffs Every Night., Disp: , Rfl:   •  montelukast (SINGULAIR) 10 MG tablet, Take 10 mg by mouth Every Night., Disp: , Rfl:   •  nitroglycerin (NITROSTAT) 0.4 MG SL tablet, Place 0.4 mg under the tongue as needed for chest pain., Disp: , Rfl:   •  omeprazole (priLOSEC) 40 MG capsule, Take 40 mg by mouth Daily., Disp: , Rfl:   •  ondansetron (ZOFRAN) 8 MG tablet, Take 1 tablet by mouth Every 8 (Eight) Hours As Needed for Nausea or Vomiting., Disp: 60 tablet, Rfl: 3  •  oxyCODONE (ROXICODONE) 5 MG immediate release tablet, Take 1-2 tabs every 4-6 hours as needed for pain, Disp: 100 tablet, Rfl: 0  •  pancrelipase, Lip-Prot-Amyl, (CREON) 44433 units capsule delayed-release particles capsule, Take 12,000 units of lipase by mouth 3 (Three) Times a Day With Meals., Disp: , Rfl:   •  polyethylene glycol (MIRALAX) packet, Take 17 g by mouth Daily As Needed., Disp: , Rfl:   •  prochlorperazine (COMPAZINE) 10 MG tablet, Take 1 tablet by mouth Every 6 (Six) Hours As Needed for Nausea or Vomiting., Disp: 60 tablet, Rfl: 3  •  promethazine (PHENERGAN) 12.5 MG tablet, Take 1 tablet by mouth Every 6 (Six) Hours As Needed for Nausea or Vomiting., Disp: 90 tablet, Rfl: 3  •  promethazine (PHENERGAN) 25 MG suppository, Insert 1 suppository into the rectum Every 6 (Six) Hours As Needed for Nausea or Vomiting., Disp: 10 each, Rfl: 0  •  rifaximin (XIFAXAN) 550 MG tablet, Take 550 mg by mouth 2 (Two) Times a Day., Disp: , Rfl:   •  Tiotropium Bromide-Olodaterol 2.5-2.5  MCG/ACT aerosol solution, Inhale 2 puffs Daily., Disp: , Rfl:   No current facility-administered medications for this visit.     Facility-Administered Medications Ordered in Other Visits:   •  sodium chloride 0.9 % infusion  - ADS Override Pull, , , ,   •  sodium chloride 0.9 % infusion  - ADS Override Pull, , , ,     ALLERGIES:    Allergies   Allergen Reactions   • Chlorhexidine Hives and Rash   • Contrast Dye Other (See Comments)     Can't have due to kidney failure per family   • Fentanyl Confusion and Unknown (See Comments)     Patient family reports extreme symptoms with fentanyl. Including confusion, restlessness, irritability an heavy sedation.     REVIEW OF SYSTEMS:    A comprehensive 14 point review of systems was performed.  Significant findings as mentioned above.  All other systems reviewed and are negative.      Physical Exam   Vital Signs:   Vitals:    05/28/19 1241   BP: (!) 89/60   Pulse: 88   Resp: 18   Temp: 97.9 °F (36.6 °C)   SpO2: 94%    General:  Alert and oriented.  Appears fatigued and uncomfortable today. In no acute distress.   HEENT:  Pupils are equal, round and reactive to light and accommodation, Extra-ocular movements full, Oropharyx clear, mmm  Neck:  No JVD, thyromegaly or lymphadenopathy  CV:  Regular rate/rhythm, no murmurs, rubs or gallops  Resp:  Lungs are clear to auscultation bilaterally  Abd:  Soft, diffuse non-specific tenderness with palpation, non-distended, bowel sounds intact, no organomegaly or masses.  Surgical scars present.  Ext:  No clubbing, cyanosis or edema.    Lymph:  No cervical, supraclavicular, axillary,adenopathy  Neuro: grossly non-focal exam    RECENT LABS:  Lab Results   Component Value Date    WBC 14.10 (H) 05/28/2019    HGB 8.9 (L) 05/28/2019    HCT 28.9 (L) 05/28/2019    MCV 90.6 05/28/2019    RDW 19.3 (H) 05/28/2019    PLT 81 (L) 05/28/2019    NEUTRORELPCT 66.6 05/20/2019    LYMPHORELPCT 26.3 05/20/2019    MONORELPCT 5.5 05/20/2019    EOSRELPCT 0.2 (L)  05/20/2019    BASORELPCT 0.0 05/20/2019    NEUTROABS 11.70 (H) 05/28/2019    LYMPHSABS 1.10 05/20/2019       Lab Results   Component Value Date     05/28/2019    K 4.0 05/28/2019    CO2 24.8 05/28/2019     05/28/2019    BUN 21 05/28/2019    CREATININE 1.18 05/28/2019    EGFRIFNONA 61 05/28/2019    EGFRIFAFRI  09/02/2016      Comment:      <15 Indicative of kidney failure.    GLUCOSE 182 (H) 05/28/2019    CALCIUM 9.1 05/28/2019    ALKPHOS 192 (H) 05/28/2019    AST 22 05/28/2019    ALT 34 05/28/2019    BILITOT 0.3 05/28/2019    ALBUMIN 3.44 (L) 05/28/2019    PROTEINTOT 6.3 05/28/2019    MG 2.0 04/24/2019    PHOS 3.3 04/24/2019      Ref Range & Units 12:38    Color, UA Yellow, Straw Yellow     Appearance, UA Clear Clear     pH, UA 5.0 - 8.0 5.5     Specific Gravity, UA 1.005 - 1.030 1.020     Glucose, UA Negative Negative     Ketones, UA Negative Negative     Bilirubin, UA Negative Negative     Blood, UA Negative Negative     Protein, UA Negative Negative     Leuk Esterase, UA Negative Negative      Nitrite, UA Negative Negative     Urobilinogen, UA 0.2 - 1.0 E.U./dL 1.0 E.U./dL         Lab Results   Component Value Date    URICACID 7.5 (H) 10/16/2018       Lab Results   Component Value Date    FERRITIN 435.00 (H) 02/20/2019    IRON 11 (L) 02/20/2019    TIBC 196 (L) 02/20/2019    LABIRON 6 (L) 02/20/2019    LBGEZOTA62 1,361 (H) 02/20/2019    FOLATE 19.70 02/20/2019    RETICCTPCT 0.98 02/20/2019    RETIC 0.0344 02/20/2019      ASSESSMENT & PLAN:  Todd Phillips is a very pleasant 68 y.o. male with    1.  Pancreatic cancer:   -Please see most recent follow up note from 5/9/19 for full details.   -He was recently restarted on treatment with Gemcitabine/Abraxane with reduced dose of 20% and D15 was eliminated given difficulty with cytopenias. With treatment, he has had increased fatigue/weakness. Therefore, planned to reduce dose further to 30% with next treatment (scheduled for 6/10/19 so patient can attend his  son's wedding). He is being seen for a sick visit today (see below).     2. Uncontrolled pain (right lower back):  -U/A normal. CBC showing elevated WBC likely related to neulasta (received on 5/20). Afebrile. He has been taking oxycodone 5 mg (1-2 tabs every 4-6 hours) which is not controlling his pain. Patient says pain is constant, sharp and worsening in right lower back. Currently rates pain 7/10. Will increase oxycodone to 10 mg (1-2 tabs) every 4 -6 hours as needed for pain, Rx provided today. Will also obtain CT lumbar spine to further evaluate. Will follow up next week with results.       I spent 25 minutes with Todd Phillips today. More than 50% of the time was spent in counseling / coordination of care for the above problems.      Electronically Signed by: VIMAL Nielson ,  May 28, 2019 1:01 PM       CC:   Adiel Denney MD

## 2019-06-03 ENCOUNTER — APPOINTMENT (OUTPATIENT)
Dept: ONCOLOGY | Facility: HOSPITAL | Age: 69
End: 2019-06-03
Attending: INTERNAL MEDICINE

## 2019-06-03 ENCOUNTER — HOSPITAL ENCOUNTER (OUTPATIENT)
Dept: CT IMAGING | Facility: HOSPITAL | Age: 69
Discharge: HOME | End: 2019-06-03
Admitting: NURSE PRACTITIONER

## 2019-06-03 DIAGNOSIS — M54.50 ACUTE RIGHT-SIDED LOW BACK PAIN WITHOUT SCIATICA: ICD-10-CM

## 2019-06-03 PROCEDURE — 72131 CT LUMBAR SPINE W/O DYE: CPT

## 2019-06-03 PROCEDURE — 72131 CT LUMBAR SPINE W/O DYE: CPT | Performed by: RADIOLOGY

## 2019-06-05 ENCOUNTER — OFFICE VISIT (OUTPATIENT)
Dept: ONCOLOGY | Facility: CLINIC | Age: 69
End: 2019-06-05

## 2019-06-05 ENCOUNTER — LAB (OUTPATIENT)
Dept: ONCOLOGY | Facility: CLINIC | Age: 69
End: 2019-06-05

## 2019-06-05 VITALS
WEIGHT: 169.6 LBS | OXYGEN SATURATION: 97 % | HEART RATE: 93 BPM | BODY MASS INDEX: 23 KG/M2 | SYSTOLIC BLOOD PRESSURE: 84 MMHG | TEMPERATURE: 97.2 F | RESPIRATION RATE: 18 BRPM | DIASTOLIC BLOOD PRESSURE: 61 MMHG

## 2019-06-05 DIAGNOSIS — C25.0 MALIGNANT NEOPLASM OF HEAD OF PANCREAS (HCC): Primary | ICD-10-CM

## 2019-06-05 DIAGNOSIS — M54.50 BILATERAL LOW BACK PAIN WITHOUT SCIATICA, UNSPECIFIED CHRONICITY: ICD-10-CM

## 2019-06-05 LAB
ALBUMIN SERPL-MCNC: 3.39 G/DL (ref 3.5–5.2)
ALBUMIN/GLOB SERPL: 1 G/DL
ALP SERPL-CCNC: 171 U/L (ref 39–117)
ALT SERPL W P-5'-P-CCNC: 18 U/L (ref 1–41)
ANION GAP SERPL CALCULATED.3IONS-SCNC: 12.6 MMOL/L
ANISOCYTOSIS BLD QL: ABNORMAL
AST SERPL-CCNC: 18 U/L (ref 1–40)
BILIRUB SERPL-MCNC: 0.3 MG/DL (ref 0.2–1.2)
BUN BLD-MCNC: 16 MG/DL (ref 8–23)
BUN/CREAT SERPL: 13.9 (ref 7–25)
CALCIUM SPEC-SCNC: 9.6 MG/DL (ref 8.6–10.5)
CHLORIDE SERPL-SCNC: 104 MMOL/L (ref 98–107)
CO2 SERPL-SCNC: 24.4 MMOL/L (ref 22–29)
CREAT BLD-MCNC: 1.15 MG/DL (ref 0.76–1.27)
DACRYOCYTES BLD QL SMEAR: ABNORMAL
DEPRECATED RDW RBC AUTO: 62.3 FL (ref 37–54)
ERYTHROCYTE [DISTWIDTH] IN BLOOD BY AUTOMATED COUNT: 19.4 % (ref 12.3–15.4)
GFR SERPL CREATININE-BSD FRML MDRD: 63 ML/MIN/1.73
GIANT PLATELETS: ABNORMAL
GLOBULIN UR ELPH-MCNC: 3.3 GM/DL
GLUCOSE BLD-MCNC: 194 MG/DL (ref 65–99)
HCT VFR BLD AUTO: 31.4 % (ref 37.5–51)
HGB BLD-MCNC: 9.7 G/DL (ref 13–17.7)
LYMPHOCYTES # BLD MANUAL: 1.25 10*3/MM3 (ref 0.7–3.1)
LYMPHOCYTES NFR BLD MANUAL: 14 % (ref 19.6–45.3)
LYMPHOCYTES NFR BLD MANUAL: 4 % (ref 5–12)
MCH RBC QN AUTO: 28.6 PG (ref 26.6–33)
MCHC RBC AUTO-ENTMCNC: 30.9 G/DL (ref 31.5–35.7)
MCV RBC AUTO: 92.6 FL (ref 79–97)
METAMYELOCYTES NFR BLD MANUAL: 4 % (ref 0–0)
MONOCYTES # BLD AUTO: 0.36 10*3/MM3 (ref 0.1–0.9)
MYELOCYTES NFR BLD MANUAL: 1 % (ref 0–0)
NEUTROPHILS # BLD AUTO: 6.85 10*3/MM3 (ref 1.7–7)
NEUTROPHILS NFR BLD MANUAL: 66 % (ref 42.7–76)
NEUTS BAND NFR BLD MANUAL: 11 % (ref 0–5)
OVALOCYTES BLD QL SMEAR: ABNORMAL
PLATELET # BLD AUTO: 381 10*3/MM3 (ref 140–450)
PMV BLD AUTO: 8.9 FL (ref 6–12)
POTASSIUM BLD-SCNC: 4.1 MMOL/L (ref 3.5–5.2)
PROT SERPL-MCNC: 6.7 G/DL (ref 6–8.5)
RBC # BLD AUTO: 3.39 10*6/MM3 (ref 4.14–5.8)
SCAN SLIDE: NORMAL
SICKLE CELLS: ABNORMAL
SMALL PLATELETS BLD QL SMEAR: ADEQUATE
SODIUM BLD-SCNC: 141 MMOL/L (ref 136–145)
WBC NRBC COR # BLD: 8.9 10*3/MM3 (ref 3.4–10.8)

## 2019-06-05 PROCEDURE — 80053 COMPREHEN METABOLIC PANEL: CPT | Performed by: NURSE PRACTITIONER

## 2019-06-05 PROCEDURE — 99214 OFFICE O/P EST MOD 30 MIN: CPT | Performed by: NURSE PRACTITIONER

## 2019-06-05 PROCEDURE — 85007 BL SMEAR W/DIFF WBC COUNT: CPT | Performed by: NURSE PRACTITIONER

## 2019-06-05 PROCEDURE — 36415 COLL VENOUS BLD VENIPUNCTURE: CPT

## 2019-06-05 PROCEDURE — 85025 COMPLETE CBC W/AUTO DIFF WBC: CPT | Performed by: NURSE PRACTITIONER

## 2019-06-05 NOTE — PROGRESS NOTES
DATE:  6/5/2019    DIAGNOSIS:  1.   Stage III moderately differentiated adenocarcinoma of the head of the pancreas with involvement of the duodenum ([id number]).    CHIEF COMPLAINT:  Follow up of lower back pain    Interval History:  Mr. Phillips presents today for follow up of LBP. Last week, he was seen for an acute visit with complaints of worsening and uncontrolled right lower back pain. He had been taking Claritin BID to help with bony pain related to neulasta which he received on 5/20. We increased his pain medication to oxycodone 10 mg (1-2 tabs) every 4 hours as needed. He has been taking 1 tablet every 4 hours which has been better controlling his pain.  U/A was checked and normal. Also obtained CT of lumbar spine to further evaluate and he is here for results today. Since his last visit, he feels like his pain is better controlled as above. However, the pain has moved from the right side to his left lower back.   Denies any lower extremity weakness/numbness. He denies any other complaints today.     PAST MEDICAL HISTORY:  Past Medical History:   Diagnosis Date   • Antral gastritis    • Asthma    • Atrial fibrillation (CMS/HCC)     treated with oral blood thinner   • Chest pain    • COPD (chronic obstructive pulmonary disease) (CMS/HCC)    • Coronary artery disease     no stents   • Diabetes mellitus (CMS/HCC)     type 2 - checks sugar 4 times per day    • Duodenitis    • Elevated cholesterol    • Emphysema of lung (CMS/HCC)    • Gallbladder disease     removed    • GERD (gastroesophageal reflux disease)    • Hard to intubate     busted lip last time   • Hyperlipidemia    • Hypertension    • Jaundice    • Kidney stone    • Kidney stones     had lithotripsy and passed 36 kidney stones as well as had one surgically removed.   • Malignant neoplasm of head of pancreas (CMS/HCC) 9/5/2018   • Nausea    • Obstructive sleep apnea on CPAP     compliant with machine    • Palpitations    • Pneumonia 08/2018   • Reflux  esophagitis    • Renal failure     stage 3 kidney disease   • Sepsis (CMS/HCC)        PAST SURGICAL HISTORY:  Past Surgical History:   Procedure Laterality Date   • BILE DUCT STENT PLACEMENT      [facility] 2 weeks ago    • CARDIAC CATHETERIZATION  11/01/1999   • CARDIOVASCULAR STRESS TEST  09/2012   • CHOLECYSTECTOMY N/A 8/10/2018    Procedure: CHOLECYSTECTOMY LAPAROSCOPIC;  Surgeon: Ronak Downs MD;  Location: [facility] OR;  Service: General   • COLONOSCOPY     • CYSTOSCOPY BLADDER STONE LITHOTRIPSY     • ECHO - CONVERTED  09/2012   • ERCP N/A 8/14/2018    Procedure: ENDOSCOPIC RETROGRADE CHOLANGIOPANCREATOGRAPHY WITH PAPILLOTOMY;  Surgeon: Scot Su MD;  Location:  [facility] OR;  Service: Gastroenterology   • ERCP N/A 10/1/2018    Procedure: ENDOSCOPIC RETROGRADE CHOLANGIOPANCREATOGRAPHY;  Surgeon: Jefry Luna MD;  Location:  [facility];  Service: Gastroenterology   • KIDNEY STONE SURGERY     • KIDNEY STONE SURGERY      open abdominal surgery   • PORTACATH PLACEMENT Right 10/23/2018    Procedure: INSERTION OF PORTACATH;  Surgeon: Ronak Downs MD;  Location: [facility] OR;  Service: General   • TONSILLECTOMY     • UPPER GASTROINTESTINAL ENDOSCOPY  08/30/2012   • WHIPPLE PROCEDURE N/A 12/31/2018    Procedure: WHIPPLE PROCEDURE, BIOPSY OF LIVER, PORTAL VEIN RESECTION AND REPAIR, G/J TUBE PLACEMENT;  Surgeon: Miquel Brody MD;  Location: [facility] OR;  Service: General       SOCIAL HISTORY:  Social History     Socioeconomic History   • Marital status:      Spouse name: Not on file   • Number of children: Not on file   • Years of education: Not on file   • Highest education level: Not on file   Tobacco Use   • Smoking status: Never Smoker   • Smokeless tobacco: Never Used   Substance and Sexual Activity   • Alcohol use: No   • Drug use: No   • Sexual activity: Defer     Partners: Female   Social History Narrative    He is  and lives alone with his  wife although they have 3 children together who all live close by. He is retired from the [employer] and was exposed to Agent Orange during [place]. He is a lifelong nonsmoker.        FAMILY HISTORY:  Family History   Problem Relation Age of Onset   • Heart attack Mother    • Heart disease Mother    • Stroke Mother    • Kidney disease Mother    • Heart failure Mother    • Lung disease Father    • Tuberculosis Father    • Diabetes Sister    • Heart attack Brother    • Heart failure Brother    • Heart disease Brother    • Diabetes Sister    • No Known Problems Brother    • No Known Problems Brother      MEDICATIONS:  The current medication list was reviewed in the EMR    Current Outpatient Medications:   •  albuterol (PROVENTIL HFA;VENTOLIN HFA) 108 (90 BASE) MCG/ACT inhaler, Inhale 2 puffs Every 4 (Four) Hours As Needed for Wheezing or Shortness of Air., Disp: , Rfl:   •  albuterol (PROVENTIL) (2.5 MG/3ML) 0.083% nebulizer solution, Take 2.5 mg by nebulization Every 6 (Six) Hours As Needed for Wheezing or Shortness of Air., Disp: , Rfl:   •  allopurinol (ZYLOPRIM) 100 MG tablet, Take 1 tablet by mouth Daily., Disp: 90 tablet, Rfl: 0  •  apixaban (ELIQUIS) 5 MG tablet tablet, Take 1 tablet by mouth Every 12 (Twelve) Hours., Disp: 60 tablet, Rfl:   •  cholecalciferol (VITAMIN D3) 1000 units tablet, Take 1,000 Units by mouth Daily., Disp: , Rfl:   •  flecainide (TAMBOCOR) 50 MG tablet, Take 50 mg by mouth Every 12 (Twelve) Hours., Disp: , Rfl:   •  FLUoxetine (PROzac) 20 MG capsule, Take 20 mg by mouth 2 (Two) Times a Day., Disp: , Rfl:   •  insulin lispro (humaLOG) 100 UNIT/ML injection, Inject  under the skin into the appropriate area as directed 3 (Three) Times a Day As Needed (before meals prn (2 units if > 150-199, 4 units if > 199))., Disp: , Rfl:   •  lactobacillus acidophilus (RISAQUAD) capsule capsule, Take 1 capsule by mouth Daily., Disp: 30 capsule, Rfl: 0  •  mometasone (ASMANEX) 220 MCG/INH inhaler, Inhale 2  puffs Every Night., Disp: , Rfl:   •  montelukast (SINGULAIR) 10 MG tablet, Take 10 mg by mouth Every Night., Disp: , Rfl:   •  nitroglycerin (NITROSTAT) 0.4 MG SL tablet, Place 0.4 mg under the tongue as needed for chest pain., Disp: , Rfl:   •  omeprazole (priLOSEC) 40 MG capsule, Take 40 mg by mouth Daily., Disp: , Rfl:   •  ondansetron (ZOFRAN) 8 MG tablet, Take 1 tablet by mouth Every 8 (Eight) Hours As Needed for Nausea or Vomiting., Disp: 60 tablet, Rfl: 3  •  oxyCODONE (ROXICODONE) 10 MG tablet, Take 1-2 tabs every 4-6 hours as needed for pain, Disp: 100 tablet, Rfl: 0  •  pancrelipase, Lip-Prot-Amyl, (CREON) 19991 units capsule delayed-release particles capsule, Take 12,000 units of lipase by mouth 3 (Three) Times a Day With Meals., Disp: , Rfl:   •  polyethylene glycol (MIRALAX) packet, Take 17 g by mouth Daily As Needed., Disp: , Rfl:   •  prochlorperazine (COMPAZINE) 10 MG tablet, Take 1 tablet by mouth Every 6 (Six) Hours As Needed for Nausea or Vomiting., Disp: 60 tablet, Rfl: 3  •  promethazine (PHENERGAN) 12.5 MG tablet, Take 1 tablet by mouth Every 6 (Six) Hours As Needed for Nausea or Vomiting., Disp: 90 tablet, Rfl: 3  •  promethazine (PHENERGAN) 25 MG suppository, Insert 1 suppository into the rectum Every 6 (Six) Hours As Needed for Nausea or Vomiting., Disp: 10 each, Rfl: 0  •  rifaximin (XIFAXAN) 550 MG tablet, Take 550 mg by mouth 2 (Two) Times a Day., Disp: , Rfl:   •  Tiotropium Bromide-Olodaterol 2.5-2.5 MCG/ACT aerosol solution, Inhale 2 puffs Daily., Disp: , Rfl:   No current facility-administered medications for this visit.     Facility-Administered Medications Ordered in Other Visits:   •  sodium chloride 0.9 % infusion  - ADS Override Pull, , , ,   •  sodium chloride 0.9 % infusion  - ADS Override Pull, , , ,     ALLERGIES:    Allergies   Allergen Reactions   • Chlorhexidine Hives and Rash   • Contrast Dye Other (See Comments)     Can't have due to kidney failure per family   •  Fentanyl Confusion and Unknown (See Comments)     Patient family reports extreme symptoms with fentanyl. Including confusion, restlessness, irritability an heavy sedation.     REVIEW OF SYSTEMS:    A comprehensive 14 point review of systems was performed.  Significant findings as mentioned above.  All other systems reviewed and are negative.      Physical Exam   Vital Signs:   Vitals:    06/05/19 1119   BP: (!) 84/61   Pulse: 93   Resp: 18   Temp: 97.2 °F (36.2 °C)   SpO2: 97%   General:  Alert and oriented, in no acute distress.   HEENT:  Pupils are equal, round and reactive to light and accommodation, Extra-ocular movements full, Oropharyx clear, mmm  Neck:  No JVD, thyromegaly or lymphadenopathy  CV:  Regular rate/rhythm, no murmurs, rubs or gallops  Resp:  Lungs are clear to auscultation bilaterally  Abd:  Soft, diffuse non-specific tenderness with palpation, non-distended, bowel sounds intact, no organomegaly or masses.  Surgical scars present.  Ext:  No clubbing, cyanosis or edema.    Lymph:  No cervical, supraclavicular, axillary,adenopathy  Neuro: grossly non-focal exam    RECENT LABS:  Lab Results   Component Value Date    WBC 8.90 06/05/2019    HGB 9.7 (L) 06/05/2019    HCT 31.4 (L) 06/05/2019    MCV 92.6 06/05/2019    RDW 19.4 (H) 06/05/2019     06/05/2019    NEUTRORELPCT 66.6 05/20/2019    LYMPHORELPCT 26.3 05/20/2019    MONORELPCT 5.5 05/20/2019    EOSRELPCT 0.2 (L) 05/20/2019    BASORELPCT 0.0 05/20/2019    NEUTROABS 6.85 06/05/2019    LYMPHSABS 1.10 05/20/2019       Lab Results   Component Value Date     06/05/2019    K 4.1 06/05/2019    CO2 24.4 06/05/2019     06/05/2019    BUN 16 06/05/2019    CREATININE 1.15 06/05/2019    EGFRIFNONA 63 06/05/2019    EGFRIFAFRI  09/02/2016      Comment:      <15 Indicative of kidney failure.    GLUCOSE 194 (H) 06/05/2019    CALCIUM 9.6 06/05/2019    ALKPHOS 171 (H) 06/05/2019    AST 18 06/05/2019    ALT 18 06/05/2019    BILITOT 0.3 06/05/2019     ALBUMIN 3.39 (L) 06/05/2019    PROTEINTOT 6.7 06/05/2019    MG 2.0 04/24/2019    PHOS 3.3 04/24/2019      Ref Range & Units 12:38    Color, UA Yellow, Straw Yellow     Appearance, UA Clear Clear     pH, UA 5.0 - 8.0 5.5     Specific Gravity, UA 1.005 - 1.030 1.020     Glucose, UA Negative Negative     Ketones, UA Negative Negative     Bilirubin, UA Negative Negative     Blood, UA Negative Negative     Protein, UA Negative Negative     Leuk Esterase, UA Negative Negative      Nitrite, UA Negative Negative     Urobilinogen, UA 0.2 - 1.0 E.U./dL 1.0 E.U./dL       IMAGING:  Ct Lumbar Spine Without Contrast    Result Date: 6/4/2019  Persistent degenerative disc change in the lumbar spine, most prominent at L4-L5, not significantly changed radiographically from the earlier exam in 2017.  359.23 mGy.cm The radiation dose reduction device was utilized for each scan per the ALARA (as low as reasonably achievable) protocol.  This report was finalized on 6/4/2019 8:00 AM by Dr. Giuliano Cheung II, MD.      ASSESSMENT & PLAN:  Todd Phillips is a very pleasant 68 y.o. male with    1.  Pancreatic cancer:   -Please see most recent follow up note from 5/9/19 for full details.   -He was recently restarted on treatment with Gemcitabine/Abraxane with reduced dose of 20% and D15 was eliminated given difficulty with cytopenias. With treatment, he has had increased fatigue/weakness. Therefore, planned to reduce dose further to 30% with next treatment (scheduled for 6/10/19 so patient can attend his son's wedding). He is being seen today for follow up of issue #2. He will follow up with Dr. Alves after next treatment for another symptom/toxicity check.     2. LBP:  -He reports taking Claritin BID around treatment to help with bony pain related to neulasta which he received on 5/20. We increased his pain medication to oxycodone 10 mg (1-2 tabs) every 4 hours as needed. He has been taking 1 tablet every 4 hours which has been better  controlling his pain.  U/A was checked and normal. Also obtained CT of lumbar spine to further evaluate which showed persistent degenerative disc changes of lumbar spine, most prominent at L4-5 with no significant changed since previous exam in 2017.   -Discussed findings with patient and his wife. Advised patient to continue with oxycodone prn. His pain could have been exacerbated by Neulasta injection. He knows to continue with Claritin BID as well with next treatment. Will continue to monitor.     [facility] Quality measures  - Todd Phillips does not use tobacco products.  -Todd Phillips received 2018 flu vaccine.  - Current outpatient and discharge medications have been reconciled for the patient.  Reviewed by: VIMAL Rolle    I spent 25 minutes with Todd Phillips today. More than 50% of the time was spent in counseling / coordination of care for the above problems.      Electronically Signed by: VIMAL Nielson ,  June 5, 2019 12:21 PM       CC:   Adiel Denney MD

## 2019-06-09 DIAGNOSIS — C25.0 MALIGNANT NEOPLASM OF HEAD OF PANCREAS (HCC): ICD-10-CM

## 2019-06-09 RX ORDER — SODIUM CHLORIDE 9 MG/ML
250 INJECTION, SOLUTION INTRAVENOUS ONCE
Status: CANCELLED | OUTPATIENT
Start: 2019-06-10

## 2019-06-09 RX ORDER — PACLITAXEL 100 MG/20ML
87.5 INJECTION, POWDER, LYOPHILIZED, FOR SUSPENSION INTRAVENOUS ONCE
Status: CANCELLED | OUTPATIENT
Start: 2019-06-10

## 2019-06-09 RX ORDER — PACLITAXEL 100 MG/20ML
87.5 INJECTION, POWDER, LYOPHILIZED, FOR SUSPENSION INTRAVENOUS ONCE
Status: CANCELLED | OUTPATIENT
Start: 2019-06-17

## 2019-06-09 RX ORDER — SODIUM CHLORIDE 9 MG/ML
250 INJECTION, SOLUTION INTRAVENOUS ONCE
Status: CANCELLED | OUTPATIENT
Start: 2019-06-17

## 2019-06-10 ENCOUNTER — LAB (OUTPATIENT)
Dept: ONCOLOGY | Facility: CLINIC | Age: 69
End: 2019-06-10

## 2019-06-10 ENCOUNTER — INFUSION (OUTPATIENT)
Dept: ONCOLOGY | Facility: HOSPITAL | Age: 69
End: 2019-06-10
Attending: INTERNAL MEDICINE

## 2019-06-10 VITALS
TEMPERATURE: 98.2 F | RESPIRATION RATE: 18 BRPM | HEART RATE: 94 BPM | OXYGEN SATURATION: 97 % | SYSTOLIC BLOOD PRESSURE: 93 MMHG | BODY MASS INDEX: 22.65 KG/M2 | WEIGHT: 167 LBS | DIASTOLIC BLOOD PRESSURE: 62 MMHG

## 2019-06-10 DIAGNOSIS — C25.0 MALIGNANT NEOPLASM OF HEAD OF PANCREAS (HCC): ICD-10-CM

## 2019-06-10 DIAGNOSIS — C25.0 MALIGNANT NEOPLASM OF HEAD OF PANCREAS (HCC): Primary | ICD-10-CM

## 2019-06-10 DIAGNOSIS — E86.0 DEHYDRATION: ICD-10-CM

## 2019-06-10 LAB
ALBUMIN SERPL-MCNC: 3.58 G/DL (ref 3.5–5.2)
ALBUMIN/GLOB SERPL: 1 G/DL
ALP SERPL-CCNC: 170 U/L (ref 39–117)
ALT SERPL W P-5'-P-CCNC: 13 U/L (ref 1–41)
ANION GAP SERPL CALCULATED.3IONS-SCNC: 12.4 MMOL/L
ANISOCYTOSIS BLD QL: NORMAL
AST SERPL-CCNC: 18 U/L (ref 1–40)
BASOPHILS # BLD AUTO: 0.04 10*3/MM3 (ref 0–0.2)
BASOPHILS NFR BLD AUTO: 0.4 % (ref 0–1.5)
BILIRUB SERPL-MCNC: 0.3 MG/DL (ref 0.2–1.2)
BUN BLD-MCNC: 22 MG/DL (ref 8–23)
BUN/CREAT SERPL: 17.1 (ref 7–25)
CALCIUM SPEC-SCNC: 9.7 MG/DL (ref 8.6–10.5)
CHLORIDE SERPL-SCNC: 104 MMOL/L (ref 98–107)
CO2 SERPL-SCNC: 24.6 MMOL/L (ref 22–29)
CREAT BLD-MCNC: 1.29 MG/DL (ref 0.76–1.27)
DEPRECATED RDW RBC AUTO: 59.9 FL (ref 37–54)
EOSINOPHIL # BLD AUTO: 0.06 10*3/MM3 (ref 0–0.4)
EOSINOPHIL NFR BLD AUTO: 0.7 % (ref 0.3–6.2)
ERYTHROCYTE [DISTWIDTH] IN BLOOD BY AUTOMATED COUNT: 18.7 % (ref 12.3–15.4)
GFR SERPL CREATININE-BSD FRML MDRD: 55 ML/MIN/1.73
GLOBULIN UR ELPH-MCNC: 3.4 GM/DL
GLUCOSE BLD-MCNC: 168 MG/DL (ref 65–99)
HCT VFR BLD AUTO: 32.6 % (ref 37.5–51)
HGB BLD-MCNC: 10.1 G/DL (ref 13–17.7)
HYPOCHROMIA BLD QL: NORMAL
IMM GRANULOCYTES # BLD AUTO: 0.46 10*3/MM3 (ref 0–0.05)
IMM GRANULOCYTES NFR BLD AUTO: 5 % (ref 0–0.5)
LARGE PLATELETS: NORMAL
LYMPHOCYTES # BLD AUTO: 1.42 10*3/MM3 (ref 0.7–3.1)
LYMPHOCYTES NFR BLD AUTO: 15.5 % (ref 19.6–45.3)
MCH RBC QN AUTO: 28.9 PG (ref 26.6–33)
MCHC RBC AUTO-ENTMCNC: 31 G/DL (ref 31.5–35.7)
MCV RBC AUTO: 93.1 FL (ref 79–97)
MONOCYTES # BLD AUTO: 0.7 10*3/MM3 (ref 0.1–0.9)
MONOCYTES NFR BLD AUTO: 7.6 % (ref 5–12)
NEUTROPHILS # BLD AUTO: 6.51 10*3/MM3 (ref 1.7–7)
NEUTROPHILS NFR BLD AUTO: 70.8 % (ref 42.7–76)
OVALOCYTES BLD QL SMEAR: NORMAL
PLATELET # BLD AUTO: 308 10*3/MM3 (ref 140–450)
PMV BLD AUTO: 10 FL (ref 6–12)
POTASSIUM BLD-SCNC: 4 MMOL/L (ref 3.5–5.2)
PROT SERPL-MCNC: 7 G/DL (ref 6–8.5)
RBC # BLD AUTO: 3.5 10*6/MM3 (ref 4.14–5.8)
SODIUM BLD-SCNC: 141 MMOL/L (ref 136–145)
WBC NRBC COR # BLD: 9.19 10*3/MM3 (ref 3.4–10.8)

## 2019-06-10 PROCEDURE — 96417 CHEMO IV INFUS EACH ADDL SEQ: CPT

## 2019-06-10 PROCEDURE — 25010000002 DEXAMETHASONE SODIUM PHOSPHATE 20 MG/5ML SOLUTION 5 ML VIAL: Performed by: INTERNAL MEDICINE

## 2019-06-10 PROCEDURE — 85007 BL SMEAR W/DIFF WBC COUNT: CPT | Performed by: NURSE PRACTITIONER

## 2019-06-10 PROCEDURE — 96375 TX/PRO/DX INJ NEW DRUG ADDON: CPT

## 2019-06-10 PROCEDURE — 25010000002 GEMCITABINE HCL 2 GM/20ML SOLUTION 20 ML VIAL: Performed by: INTERNAL MEDICINE

## 2019-06-10 PROCEDURE — 25010000002 PACLITAXEL PROTEIN-BOUND PART PER 1 MG: Performed by: INTERNAL MEDICINE

## 2019-06-10 PROCEDURE — 85025 COMPLETE CBC W/AUTO DIFF WBC: CPT | Performed by: NURSE PRACTITIONER

## 2019-06-10 PROCEDURE — 96367 TX/PROPH/DG ADDL SEQ IV INF: CPT

## 2019-06-10 PROCEDURE — 25010000002 FOSAPREPITANT PER 1 MG: Performed by: INTERNAL MEDICINE

## 2019-06-10 PROCEDURE — 96413 CHEMO IV INFUSION 1 HR: CPT

## 2019-06-10 PROCEDURE — 80053 COMPREHEN METABOLIC PANEL: CPT | Performed by: NURSE PRACTITIONER

## 2019-06-10 RX ORDER — DEXAMETHASONE 4 MG/1
TABLET ORAL
Qty: 12 TABLET | Refills: 2 | Status: SHIPPED | OUTPATIENT
Start: 2019-06-10 | End: 2019-09-05 | Stop reason: HOSPADM

## 2019-06-10 RX ORDER — SODIUM CHLORIDE 0.9 % (FLUSH) 0.9 %
10 SYRINGE (ML) INJECTION AS NEEDED
Status: CANCELLED | OUTPATIENT
Start: 2019-06-17

## 2019-06-10 RX ORDER — SODIUM CHLORIDE 0.9 % (FLUSH) 0.9 %
10 SYRINGE (ML) INJECTION AS NEEDED
Status: DISCONTINUED | OUTPATIENT
Start: 2019-06-10 | End: 2019-06-10 | Stop reason: HOSPADM

## 2019-06-10 RX ORDER — SODIUM CHLORIDE 9 MG/ML
INJECTION, SOLUTION INTRAVENOUS
Status: COMPLETED
Start: 2019-06-10 | End: 2019-06-10

## 2019-06-10 RX ORDER — PACLITAXEL 100 MG/20ML
170 INJECTION, POWDER, LYOPHILIZED, FOR SUSPENSION INTRAVENOUS ONCE
Status: COMPLETED | OUTPATIENT
Start: 2019-06-10 | End: 2019-06-10

## 2019-06-10 RX ORDER — SODIUM CHLORIDE 9 MG/ML
1000 INJECTION, SOLUTION INTRAVENOUS ONCE
Status: COMPLETED | OUTPATIENT
Start: 2019-06-10 | End: 2019-06-10

## 2019-06-10 RX ADMIN — PACLITAXEL 170 MG: 100 INJECTION, POWDER, LYOPHILIZED, FOR SUSPENSION INTRAVENOUS at 13:19

## 2019-06-10 RX ADMIN — SODIUM CHLORIDE 150 MG: 9 INJECTION, SOLUTION INTRAVENOUS at 12:25

## 2019-06-10 RX ADMIN — DEXAMETHASONE SODIUM PHOSPHATE 12 MG: 4 INJECTION, SOLUTION INTRAMUSCULAR; INTRAVENOUS at 12:08

## 2019-06-10 RX ADMIN — SODIUM CHLORIDE 1000 ML: 9 INJECTION, SOLUTION INTRAVENOUS at 12:08

## 2019-06-10 RX ADMIN — SODIUM CHLORIDE, PRESERVATIVE FREE 500 UNITS: 5 INJECTION INTRAVENOUS at 15:00

## 2019-06-10 RX ADMIN — SODIUM CHLORIDE, PRESERVATIVE FREE 10 ML: 5 INJECTION INTRAVENOUS at 14:59

## 2019-06-10 RX ADMIN — GEMCITABINE 1400 MG: 100 INJECTION, SOLUTION INTRAVENOUS at 14:07

## 2019-06-17 ENCOUNTER — INFUSION (OUTPATIENT)
Dept: ONCOLOGY | Facility: HOSPITAL | Age: 69
End: 2019-06-17
Attending: INTERNAL MEDICINE

## 2019-06-17 ENCOUNTER — LAB (OUTPATIENT)
Dept: ONCOLOGY | Facility: CLINIC | Age: 69
End: 2019-06-17

## 2019-06-17 VITALS
WEIGHT: 163.8 LBS | OXYGEN SATURATION: 98 % | SYSTOLIC BLOOD PRESSURE: 95 MMHG | BODY MASS INDEX: 22.22 KG/M2 | TEMPERATURE: 98 F | HEART RATE: 101 BPM | DIASTOLIC BLOOD PRESSURE: 55 MMHG | RESPIRATION RATE: 20 BRPM

## 2019-06-17 DIAGNOSIS — C25.0 MALIGNANT NEOPLASM OF HEAD OF PANCREAS (HCC): ICD-10-CM

## 2019-06-17 DIAGNOSIS — E86.0 DEHYDRATION: Primary | ICD-10-CM

## 2019-06-17 LAB
ALBUMIN SERPL-MCNC: 3.5 G/DL (ref 3.5–5.2)
ALBUMIN/GLOB SERPL: 1.2 G/DL
ALP SERPL-CCNC: 111 U/L (ref 39–117)
ALT SERPL W P-5'-P-CCNC: 15 U/L (ref 1–41)
ANION GAP SERPL CALCULATED.3IONS-SCNC: 12.3 MMOL/L
AST SERPL-CCNC: 14 U/L (ref 1–40)
BASOPHILS # BLD AUTO: 0.01 10*3/MM3 (ref 0–0.2)
BASOPHILS NFR BLD AUTO: 0.2 % (ref 0–1.5)
BILIRUB SERPL-MCNC: 0.4 MG/DL (ref 0.2–1.2)
BUN BLD-MCNC: 22 MG/DL (ref 8–23)
BUN/CREAT SERPL: 19.1 (ref 7–25)
CALCIUM SPEC-SCNC: 9.4 MG/DL (ref 8.6–10.5)
CHLORIDE SERPL-SCNC: 104 MMOL/L (ref 98–107)
CO2 SERPL-SCNC: 22.7 MMOL/L (ref 22–29)
CREAT BLD-MCNC: 1.15 MG/DL (ref 0.76–1.27)
DEPRECATED RDW RBC AUTO: 52.5 FL (ref 37–54)
EOSINOPHIL # BLD AUTO: 0.01 10*3/MM3 (ref 0–0.4)
EOSINOPHIL NFR BLD AUTO: 0.2 % (ref 0.3–6.2)
ERYTHROCYTE [DISTWIDTH] IN BLOOD BY AUTOMATED COUNT: 16.4 % (ref 12.3–15.4)
GFR SERPL CREATININE-BSD FRML MDRD: 63 ML/MIN/1.73
GLOBULIN UR ELPH-MCNC: 2.9 GM/DL
GLUCOSE BLD-MCNC: 144 MG/DL (ref 65–99)
HCT VFR BLD AUTO: 26.8 % (ref 37.5–51)
HGB BLD-MCNC: 8.6 G/DL (ref 13–17.7)
IMM GRANULOCYTES # BLD AUTO: 0.05 10*3/MM3 (ref 0–0.05)
IMM GRANULOCYTES NFR BLD AUTO: 1 % (ref 0–0.5)
LYMPHOCYTES # BLD AUTO: 0.75 10*3/MM3 (ref 0.7–3.1)
LYMPHOCYTES NFR BLD AUTO: 14.4 % (ref 19.6–45.3)
MCH RBC QN AUTO: 29.7 PG (ref 26.6–33)
MCHC RBC AUTO-ENTMCNC: 32.1 G/DL (ref 31.5–35.7)
MCV RBC AUTO: 92.4 FL (ref 79–97)
MONOCYTES # BLD AUTO: 0.36 10*3/MM3 (ref 0.1–0.9)
MONOCYTES NFR BLD AUTO: 6.9 % (ref 5–12)
NEUTROPHILS # BLD AUTO: 4.02 10*3/MM3 (ref 1.7–7)
NEUTROPHILS NFR BLD AUTO: 77.3 % (ref 42.7–76)
PLATELET # BLD AUTO: 152 10*3/MM3 (ref 140–450)
PMV BLD AUTO: 9.7 FL (ref 6–12)
POTASSIUM BLD-SCNC: 4.1 MMOL/L (ref 3.5–5.2)
PROT SERPL-MCNC: 6.4 G/DL (ref 6–8.5)
RBC # BLD AUTO: 2.9 10*6/MM3 (ref 4.14–5.8)
SODIUM BLD-SCNC: 139 MMOL/L (ref 136–145)
WBC NRBC COR # BLD: 5.2 10*3/MM3 (ref 3.4–10.8)

## 2019-06-17 PROCEDURE — 96417 CHEMO IV INFUS EACH ADDL SEQ: CPT

## 2019-06-17 PROCEDURE — 25010000002 PEGFILGRASTIM 6 MG/0.6ML PREFILLED SYRINGE KIT: Performed by: INTERNAL MEDICINE

## 2019-06-17 PROCEDURE — 80053 COMPREHEN METABOLIC PANEL: CPT | Performed by: INTERNAL MEDICINE

## 2019-06-17 PROCEDURE — 25010000002 FOSAPREPITANT PER 1 MG: Performed by: INTERNAL MEDICINE

## 2019-06-17 PROCEDURE — 96413 CHEMO IV INFUSION 1 HR: CPT

## 2019-06-17 PROCEDURE — 25010000002 GEMCITABINE HCL 2 GM/20ML SOLUTION 20 ML VIAL: Performed by: INTERNAL MEDICINE

## 2019-06-17 PROCEDURE — 85025 COMPLETE CBC W/AUTO DIFF WBC: CPT | Performed by: INTERNAL MEDICINE

## 2019-06-17 PROCEDURE — 25010000002 PACLITAXEL PROTEIN-BOUND PART PER 1 MG: Performed by: INTERNAL MEDICINE

## 2019-06-17 PROCEDURE — 96367 TX/PROPH/DG ADDL SEQ IV INF: CPT

## 2019-06-17 PROCEDURE — 96377 APPLICATON ON-BODY INJECTOR: CPT

## 2019-06-17 PROCEDURE — 96375 TX/PRO/DX INJ NEW DRUG ADDON: CPT

## 2019-06-17 PROCEDURE — 25010000002 DEXAMETHASONE SODIUM PHOSPHATE 20 MG/5ML SOLUTION 5 ML VIAL: Performed by: INTERNAL MEDICINE

## 2019-06-17 RX ORDER — SODIUM CHLORIDE 9 MG/ML
INJECTION, SOLUTION INTRAVENOUS
Status: COMPLETED
Start: 2019-06-17 | End: 2019-06-17

## 2019-06-17 RX ORDER — SODIUM CHLORIDE 0.9 % (FLUSH) 0.9 %
10 SYRINGE (ML) INJECTION AS NEEDED
Status: CANCELLED | OUTPATIENT
Start: 2019-07-09

## 2019-06-17 RX ORDER — PACLITAXEL 100 MG/20ML
170 INJECTION, POWDER, LYOPHILIZED, FOR SUSPENSION INTRAVENOUS ONCE
Status: COMPLETED | OUTPATIENT
Start: 2019-06-17 | End: 2019-06-17

## 2019-06-17 RX ORDER — SODIUM CHLORIDE 0.9 % (FLUSH) 0.9 %
10 SYRINGE (ML) INJECTION AS NEEDED
Status: DISCONTINUED | OUTPATIENT
Start: 2019-06-17 | End: 2019-06-17 | Stop reason: HOSPADM

## 2019-06-17 RX ADMIN — GEMCITABINE 1400 MG: 100 INJECTION, SOLUTION INTRAVENOUS at 12:46

## 2019-06-17 RX ADMIN — DEXAMETHASONE SODIUM PHOSPHATE 12 MG: 4 INJECTION, SOLUTION INTRAMUSCULAR; INTRAVENOUS at 10:18

## 2019-06-17 RX ADMIN — SODIUM CHLORIDE 150 MG: 9 INJECTION, SOLUTION INTRAVENOUS at 10:34

## 2019-06-17 RX ADMIN — SODIUM CHLORIDE, PRESERVATIVE FREE 10 ML: 5 INJECTION INTRAVENOUS at 13:42

## 2019-06-17 RX ADMIN — PACLITAXEL 170 MG: 100 INJECTION, POWDER, LYOPHILIZED, FOR SUSPENSION INTRAVENOUS at 11:17

## 2019-06-17 RX ADMIN — PEGFILGRASTIM 6 MG: KIT SUBCUTANEOUS at 13:40

## 2019-06-17 RX ADMIN — HEPARIN SODIUM (PORCINE) LOCK FLUSH IV SOLN 100 UNIT/ML 500 UNITS: 100 SOLUTION at 13:42

## 2019-06-17 RX ADMIN — SODIUM CHLORIDE 1000 ML: 9 INJECTION, SOLUTION INTRAVENOUS at 10:16

## 2019-06-24 ENCOUNTER — LAB (OUTPATIENT)
Dept: ONCOLOGY | Facility: CLINIC | Age: 69
End: 2019-06-24

## 2019-06-24 ENCOUNTER — OFFICE VISIT (OUTPATIENT)
Dept: ONCOLOGY | Facility: CLINIC | Age: 69
End: 2019-06-24

## 2019-06-24 VITALS
TEMPERATURE: 97.6 F | HEART RATE: 88 BPM | SYSTOLIC BLOOD PRESSURE: 95 MMHG | RESPIRATION RATE: 18 BRPM | WEIGHT: 164 LBS | DIASTOLIC BLOOD PRESSURE: 63 MMHG | BODY MASS INDEX: 22.24 KG/M2 | OXYGEN SATURATION: 95 %

## 2019-06-24 DIAGNOSIS — C25.0 MALIGNANT NEOPLASM OF HEAD OF PANCREAS (HCC): Primary | ICD-10-CM

## 2019-06-24 DIAGNOSIS — M54.50 BILATERAL LOW BACK PAIN WITHOUT SCIATICA, UNSPECIFIED CHRONICITY: ICD-10-CM

## 2019-06-24 DIAGNOSIS — E86.0 DEHYDRATION: ICD-10-CM

## 2019-06-24 DIAGNOSIS — K90.9 MALABSORPTION OF IRON: ICD-10-CM

## 2019-06-24 DIAGNOSIS — D50.9 IRON DEFICIENCY ANEMIA, UNSPECIFIED IRON DEFICIENCY ANEMIA TYPE: ICD-10-CM

## 2019-06-24 DIAGNOSIS — G89.3 NEOPLASM RELATED PAIN: ICD-10-CM

## 2019-06-24 DIAGNOSIS — K59.00 CONSTIPATION, UNSPECIFIED CONSTIPATION TYPE: ICD-10-CM

## 2019-06-24 DIAGNOSIS — R11.0 NAUSEA: ICD-10-CM

## 2019-06-24 LAB
ALBUMIN SERPL-MCNC: 3.28 G/DL (ref 3.5–5.2)
ALBUMIN/GLOB SERPL: 1.1 G/DL
ALP SERPL-CCNC: 179 U/L (ref 39–117)
ALT SERPL W P-5'-P-CCNC: 15 U/L (ref 1–41)
ANION GAP SERPL CALCULATED.3IONS-SCNC: 11.8 MMOL/L
AST SERPL-CCNC: 14 U/L (ref 1–40)
BILIRUB SERPL-MCNC: 0.2 MG/DL (ref 0.2–1.2)
BUN BLD-MCNC: 17 MG/DL (ref 8–23)
BUN/CREAT SERPL: 13.7 (ref 7–25)
CALCIUM SPEC-SCNC: 9.2 MG/DL (ref 8.6–10.5)
CANCER AG19-9 SERPL-ACNC: 82 U/ML
CHLORIDE SERPL-SCNC: 105 MMOL/L (ref 98–107)
CO2 SERPL-SCNC: 24.2 MMOL/L (ref 22–29)
CREAT BLD-MCNC: 1.24 MG/DL (ref 0.76–1.27)
DEPRECATED RDW RBC AUTO: 54.9 FL (ref 37–54)
ERYTHROCYTE [DISTWIDTH] IN BLOOD BY AUTOMATED COUNT: 17 % (ref 12.3–15.4)
GFR SERPL CREATININE-BSD FRML MDRD: 57 ML/MIN/1.73
GLOBULIN UR ELPH-MCNC: 3 GM/DL
GLUCOSE BLD-MCNC: 199 MG/DL (ref 65–99)
HCT VFR BLD AUTO: 28.1 % (ref 37.5–51)
HGB BLD-MCNC: 9.1 G/DL (ref 13–17.7)
LYMPHOCYTES # BLD MANUAL: 1.58 10*3/MM3 (ref 0.7–3.1)
LYMPHOCYTES NFR BLD MANUAL: 10 % (ref 5–12)
LYMPHOCYTES NFR BLD MANUAL: 8 % (ref 19.6–45.3)
MCH RBC QN AUTO: 29.4 PG (ref 26.6–33)
MCHC RBC AUTO-ENTMCNC: 32.4 G/DL (ref 31.5–35.7)
MCV RBC AUTO: 90.6 FL (ref 79–97)
METAMYELOCYTES NFR BLD MANUAL: 5 % (ref 0–0)
MONOCYTES # BLD AUTO: 1.98 10*3/MM3 (ref 0.1–0.9)
MYELOCYTES NFR BLD MANUAL: 1 % (ref 0–0)
NEUTROPHILS # BLD AUTO: 15.05 10*3/MM3 (ref 1.7–7)
NEUTROPHILS NFR BLD MANUAL: 61 % (ref 42.7–76)
NEUTS BAND NFR BLD MANUAL: 15 % (ref 0–5)
NRBC SPEC MANUAL: 2 /100 WBC (ref 0–0.2)
PLAT MORPH BLD: NORMAL
PLATELET # BLD AUTO: 91 10*3/MM3 (ref 140–450)
PMV BLD AUTO: 10.2 FL (ref 6–12)
POIKILOCYTOSIS BLD QL SMEAR: ABNORMAL
POTASSIUM BLD-SCNC: 4.1 MMOL/L (ref 3.5–5.2)
PROT SERPL-MCNC: 6.3 G/DL (ref 6–8.5)
RBC # BLD AUTO: 3.1 10*6/MM3 (ref 4.14–5.8)
SCAN SLIDE: NORMAL
SCHISTOCYTES BLD QL SMEAR: ABNORMAL
SODIUM BLD-SCNC: 141 MMOL/L (ref 136–145)
WBC NRBC COR # BLD: 19.8 10*3/MM3 (ref 3.4–10.8)

## 2019-06-24 PROCEDURE — 99215 OFFICE O/P EST HI 40 MIN: CPT | Performed by: NURSE PRACTITIONER

## 2019-06-24 PROCEDURE — 85025 COMPLETE CBC W/AUTO DIFF WBC: CPT | Performed by: INTERNAL MEDICINE

## 2019-06-24 PROCEDURE — 36415 COLL VENOUS BLD VENIPUNCTURE: CPT

## 2019-06-24 PROCEDURE — 86301 IMMUNOASSAY TUMOR CA 19-9: CPT | Performed by: NURSE PRACTITIONER

## 2019-06-24 PROCEDURE — 85007 BL SMEAR W/DIFF WBC COUNT: CPT | Performed by: INTERNAL MEDICINE

## 2019-06-24 PROCEDURE — 80053 COMPREHEN METABOLIC PANEL: CPT | Performed by: INTERNAL MEDICINE

## 2019-06-24 NOTE — PROGRESS NOTES
NAME: Todd Phillips    : 1950    DATE : 19    DIAGNOSIS:   1.   Stage III moderately differentiated adenocarcinoma of the head of the pancreas with involvement of the duodenum ([id number]).    2.  Repeated episodes of bacteremia due to cholangitis -    3.  Refractory nausea/vomiting    CHIEF COMPLAINT:  Follow up of pancreatic cancer and toxicity check     TREATMENT:  1.       2.  On 18, Dr. Brody performed  Pancreaticoduodenectomy, wedge biopsy of the liver, portal vein resection and lateral repair and gastrojejunostomy tube placement    3.       HISTORY OF PRESENT ILLNESS:   Todd Phillips is a very pleasant 69 y.o. male who is being seen today at the request of Dr. Miquel Brody for evaluation and treatment of pancreatic cancer.  Mr. Phillips initially developed symptoms in 2018. At that time he had pain in his upper abdomen which would radiate to his back.  In July, the pain intensified.  He was seen in the ER and also by Dr. Su.  He underwent RUQ U/S and then HIDA scan and it was determined he had a dysfunctional gallbladder.  He saw Dr. Downs and had cholecystectomy on 8-10-18.  Pathology showed chronic cholecystitis and an incidental benign lymph node.  He re-presented in the post-operative period with jaundice.  Dr. Su performed ERCP on18.  He was noted to have a 3 cm irregular tight stricture of the distal common bile duct with proximal biliary ductal dilatation.  Biliary papillotomy and stricture dilatation performed and brushings taken.  A 10 Fr x 5 cm biliary stent was placed.  Brushings were taken but were negative.  There was sl dilatation of the distal pancreatic duct without discrete stricture.   He was discharged with improving jaundice on .  On  he represented with sepsis due to Klebsiella pneumonia bacteremia and was admitted.  He then was referred to Dr. Brody for evaluation and treatment.  He has been set up for EUS with biopsy next Friday for  what appears to be a primary pancreatic malignancy in the head of the pancreas.  Dr. Brody has referred him for consideration of neoadjuvant therapy.    INTERVAL HISTORY:  Mr. Phillips is here today with his wife for follow up of pancreatic cancer and toxicity check. Since his last visit, overall, he reports he has been doing well. He continues to receive treatment with Gemzar/Abraxane.Given difficulty with cytopenias, fatigue and weakness, treatment was reduced reduced further to 30% with C4. With dose reduction, he reports tolerating this much better with improvement in fatigue. Although he says he fell a couple days after his treatment as he says he will sometimes have weakness in lower extremities. He says he scraped his face and had some bleeding/bruising on his left forearm, otherwise he had no further injury and was evaluated by his  nurse. He has been receiving IVF three times/week which has been helpful. At present, he denies any nausea and has antiemetics if needed. He says he is eating and drinking well. He continues to have intermittent constipation controlled with stool softeners. He reports ongoing LBP which is more tolerable with pain medications. He is taking oxycodone 10 mg every 4 hours. He says the pain will increase around neulasta injection. He is taking Claritin BID as well. He denies any other complaints today.     PAST MEDICAL HISTORY:  Past Medical History:   Diagnosis Date   • Antral gastritis    • Asthma    • Atrial fibrillation (CMS/HCC)     treated with oral blood thinner   • Chest pain    • COPD (chronic obstructive pulmonary disease) (CMS/HCC)    • Coronary artery disease     no stents   • Diabetes mellitus (CMS/HCC)     type 2 - checks sugar 4 times per day    • Duodenitis    • Elevated cholesterol    • Emphysema of lung (CMS/HCC)    • Gallbladder disease     removed    • GERD (gastroesophageal reflux disease)    • Hard to intubate     busted lip last time   • Hyperlipidemia    •  Hypertension    • Jaundice    • Kidney stone    • Kidney stones     had lithotripsy and passed 36 kidney stones as well as had one surgically removed.   • Malignant neoplasm of head of pancreas (CMS/HCC) 9/5/2018   • Nausea    • Obstructive sleep apnea on CPAP     compliant with machine    • Palpitations    • Pneumonia 08/2018   • Reflux esophagitis    • Renal failure     stage 3 kidney disease   • Sepsis (CMS/HCC)        PAST SURGICAL HISTORY:  Past Surgical History:   Procedure Laterality Date   • BILE DUCT STENT PLACEMENT      [facility] 2 weeks ago    • CARDIAC CATHETERIZATION  11/01/1999   • CARDIOVASCULAR STRESS TEST  09/2012   • CHOLECYSTECTOMY N/A 8/10/2018    Procedure: CHOLECYSTECTOMY LAPAROSCOPIC;  Surgeon: Ronak Downs MD;  Location: [facility] OR;  Service: General   • COLONOSCOPY     • CYSTOSCOPY BLADDER STONE LITHOTRIPSY     • ECHO - CONVERTED  09/2012   • ERCP N/A 8/14/2018    Procedure: ENDOSCOPIC RETROGRADE CHOLANGIOPANCREATOGRAPHY WITH PAPILLOTOMY;  Surgeon: Scot Su MD;  Location:  [facility] OR;  Service: Gastroenterology   • ERCP N/A 10/1/2018    Procedure: ENDOSCOPIC RETROGRADE CHOLANGIOPANCREATOGRAPHY;  Surgeon: Jefry Luna MD;  Location:  [facility];  Service: Gastroenterology   • KIDNEY STONE SURGERY     • KIDNEY STONE SURGERY      open abdominal surgery   • PORTACATH PLACEMENT Right 10/23/2018    Procedure: INSERTION OF PORTACATH;  Surgeon: Ronak Downs MD;  Location:  [facility] OR;  Service: General   • TONSILLECTOMY     • UPPER GASTROINTESTINAL ENDOSCOPY  08/30/2012   • WHIPPLE PROCEDURE N/A 12/31/2018    Procedure: WHIPPLE PROCEDURE, BIOPSY OF LIVER, PORTAL VEIN RESECTION AND REPAIR, G/J TUBE PLACEMENT;  Surgeon: Miquel Brody MD;  Location:  [facility] OR;  Service: General       FAMILY HISTORY:  Family History   Problem Relation Age of Onset   • Heart attack Mother    • Heart disease Mother    • Stroke Mother    • Kidney disease  Mother    • Heart failure Mother    • Lung disease Father    • Tuberculosis Father    • Diabetes Sister    • Heart attack Brother    • Heart failure Brother    • Heart disease Brother    • Diabetes Sister    • No Known Problems Brother    • No Known Problems Brother        SOCIAL HISTORY:  Social History     Socioeconomic History   • Marital status:      Spouse name: Not on file   • Number of children: Not on file   • Years of education: Not on file   • Highest education level: Not on file   Tobacco Use   • Smoking status: Never Smoker   • Smokeless tobacco: Never Used   Substance and Sexual Activity   • Alcohol use: No   • Drug use: No   • Sexual activity: Defer     Partners: Female   Social History Narrative    He is  and lives alone with his wife although they have 3 children together who all live close by. He is retired from the [employer] and was exposed to Agent Orange during [place]. He is a lifelong nonsmoker.         A DIL  of cancer.    REVIEW OF SYSTEMS:   A comprehensive 14 point review of systems was performed.  Significant findings as mentioned above.  All other systems reviewed and are negative.      MEDICATIONS:  The current medication list was reviewed in the EMR    Current Outpatient Medications:   •  albuterol (PROVENTIL HFA;VENTOLIN HFA) 108 (90 BASE) MCG/ACT inhaler, Inhale 2 puffs Every 4 (Four) Hours As Needed for Wheezing or Shortness of Air., Disp: , Rfl:   •  albuterol (PROVENTIL) (2.5 MG/3ML) 0.083% nebulizer solution, Take 2.5 mg by nebulization Every 6 (Six) Hours As Needed for Wheezing or Shortness of Air., Disp: , Rfl:   •  allopurinol (ZYLOPRIM) 100 MG tablet, Take 1 tablet by mouth Daily., Disp: 90 tablet, Rfl: 0  •  apixaban (ELIQUIS) 5 MG tablet tablet, Take 1 tablet by mouth Every 12 (Twelve) Hours., Disp: 60 tablet, Rfl:   •  cholecalciferol (VITAMIN D3) 1000 units tablet, Take 1,000 Units by mouth Daily., Disp: , Rfl:   •  dexamethasone (DECADRON) 4 MG tablet,  Take 2 tablets by mouth in the morning on days 2, 3, and 4 after chemo., Disp: 12 tablet, Rfl: 2  •  flecainide (TAMBOCOR) 50 MG tablet, Take 50 mg by mouth Every 12 (Twelve) Hours., Disp: , Rfl:   •  FLUoxetine (PROzac) 20 MG capsule, Take 20 mg by mouth 2 (Two) Times a Day., Disp: , Rfl:   •  insulin lispro (humaLOG) 100 UNIT/ML injection, Inject  under the skin into the appropriate area as directed 3 (Three) Times a Day As Needed (before meals prn (2 units if > 150-199, 4 units if > 199))., Disp: , Rfl:   •  lactobacillus acidophilus (RISAQUAD) capsule capsule, Take 1 capsule by mouth Daily., Disp: 30 capsule, Rfl: 0  •  mometasone (ASMANEX) 220 MCG/INH inhaler, Inhale 2 puffs Every Night., Disp: , Rfl:   •  montelukast (SINGULAIR) 10 MG tablet, Take 10 mg by mouth Every Night., Disp: , Rfl:   •  nitroglycerin (NITROSTAT) 0.4 MG SL tablet, Place 0.4 mg under the tongue as needed for chest pain., Disp: , Rfl:   •  omeprazole (priLOSEC) 40 MG capsule, Take 40 mg by mouth Daily., Disp: , Rfl:   •  ondansetron (ZOFRAN) 8 MG tablet, Take 1 tablet by mouth Every 8 (Eight) Hours As Needed for Nausea or Vomiting., Disp: 60 tablet, Rfl: 3  •  oxyCODONE (ROXICODONE) 10 MG tablet, Take 1-2 tabs every 4-6 hours as needed for pain, Disp: 100 tablet, Rfl: 0  •  pancrelipase, Lip-Prot-Amyl, (CREON) 69693 units capsule delayed-release particles capsule, Take 12,000 units of lipase by mouth 3 (Three) Times a Day With Meals., Disp: , Rfl:   •  polyethylene glycol (MIRALAX) packet, Take 17 g by mouth Daily As Needed., Disp: , Rfl:   •  prochlorperazine (COMPAZINE) 10 MG tablet, Take 1 tablet by mouth Every 6 (Six) Hours As Needed for Nausea or Vomiting., Disp: 60 tablet, Rfl: 3  •  promethazine (PHENERGAN) 12.5 MG tablet, Take 1 tablet by mouth Every 6 (Six) Hours As Needed for Nausea or Vomiting., Disp: 90 tablet, Rfl: 3  •  promethazine (PHENERGAN) 25 MG suppository, Insert 1 suppository into the rectum Every 6 (Six) Hours As  Needed for Nausea or Vomiting., Disp: 10 each, Rfl: 0  •  rifaximin (XIFAXAN) 550 MG tablet, Take 550 mg by mouth 2 (Two) Times a Day., Disp: , Rfl:   •  Tiotropium Bromide-Olodaterol 2.5-2.5 MCG/ACT aerosol solution, Inhale 2 puffs Daily., Disp: , Rfl:   No current facility-administered medications for this visit.     Facility-Administered Medications Ordered in Other Visits:   •  sodium chloride 0.9 % infusion  - ADS Override Pull, , , ,   •  sodium chloride 0.9 % infusion  - ADS Override Pull, , , ,     ALLERGIES:    Allergies   Allergen Reactions   • Chlorhexidine Hives and Rash   • Contrast Dye Other (See Comments)     Can't have due to kidney failure per family   • Fentanyl Confusion and Unknown (See Comments)     Patient family reports extreme symptoms with fentanyl. Including confusion, restlessness, irritability an heavy sedation.     PHYSICAL EXAM:  Vitals:    06/24/19 1056   BP: 95/63   Pulse: 88   Resp: 18   Temp: 97.6 °F (36.4 °C)   SpO2: 95%   General:  Alert and oriented. Appears well today, in good spirits.   HEENT:  Pupils are equal, round and reactive to light and accommodation, Extra-ocular movements full, Oropharyx clear, MMM  Neck:  No JVD, thyromegaly or lymphadenopathy  CV:  Regular rate/rhythm, no murmurs, rubs or gallops  Resp:  Lungs are clear to auscultation bilaterally  Abd:  Soft, sl diffuse tenderness with palpation, non-distended, bowel sounds intact, no organomegaly or masses.  Surgical scars present.  Int: Bruising on left forearm along with skin tear with steristrip in place, CDI. Bruising/abrasion on left cheek.    Ext:  No clubbing, cyanosis or edema.    Lymph:  No cervical, supraclavicular, axillary,adenopathy  Neuro: grossly non-focal exam    PATHOLOGY:  08-15-18         12-31-18 Whipple  Final Diagnosis   1. LIVER, WEDGE BIOPSY:  Small bland ductular proliferation compatible with bile duct adenoma.   Negative for metastatic carcinoma.   2. OMENTUM:  Mild chronic inflammation  and surface adhesions; negative for neoplasm.   3. HEPATIC ARTERY LYMPH NODE:  Sinus histiocytosis; single lymph node negative for metastatic carcinoma. (0/1)  4. SUBMITTED BILE DUCT MARGIN:  Margin with chronic inflammation; negative for adenocarcinoma.   5. AORTA-CAVAL LYMPH NODES:  Two lymph nodes negative for metastatic carcinoma. (0/2)  6. PANCREATODUODENECTOMY (WHIPPLE PROCEDURE);  Invasive moderately differentiated adenocarcinoma arising in head of pancreas and involving duodenum.  Gross tumor size 3.7 cm in greatest dimension.  Perineural and peripancreatic extension identified.   Soft tissue at superior mesenteric artery positive for neoplasm.  Posterior/uncinate margin involved by neoplasm.  Five of twelve peripancreatic lymph nodes positive for metastatic neoplasm. See Template.        PANCREAS EXOCRINE:  TYPE OF SPECIMEN/PROCEDURE: Whipple procedure  SPECIMEN INTEGRITY: Intact  TUMOR SITE (HEAD, BODY, TAIL): Head  TUMOR SIZE (GREATEST DIMENSION): 3.7 cm greatest dimension  HISTOLOGIC TYPE: Adenocarcinoma  HISTOLOGIC stGstRstAstDstEst:st st1st TUMOR EXTENSION: Neoplasm involves duodenum and peripancreatic fat  SURGICAL MARGINS, IF INVOLVED, (SPECIFY WHERE): Involved  MARGINS EXAMINED: See below  PARENCHYMAL MARGIN: Not involved  UNCINATE: Involved  BILE DUCT: Not involved  DISTAL MARGIN: Not involved   PROXIMAL MARGIN: Not involved   OTHER MARGINS: SMA margin involved   CLOSEST MARGIN: Margins involved    SPECIFIC MARGIN: SMA and posterior/uncinate  INVADES CELIAC AXIS OR SMA: No  VASCULAR/LYMPHATIC INVASION: Present  PERINEURAL INVASION:  Present  REGIONAL LYMPH NODES: Submitted  NUMBER OF LYMPH NODES INVOLVED: 5  NUMBER OF LYMPH NODES EXAMINED: 15  EXTRACAPSULAR EXTENSION: Focally present  TREATMENT EFFECT: No known presurgical therapy  ADDITIONAL PATHOLOGIC FINDINGS: Chronic pancreatitis   OTHER STUDIES: Available upon request  AJCC PATHOLOGIC STAGE: (COMPLETED BY PATHOLOGIST BASED ONLY ON TISSUE FINDINGS, MORE  EXTENSIVE DISEASE MAY NOT BE KNOW TO THE PATHOLOGIST)  pT=  2   pN= 2  AJCC PATHOLOGIC STAGE: III     19 Abdominal Fluid:  Final Diagnosis    ABDOMINAL FLUID:  Negative for malignancy.  Acute inflammation and necrotic debris.  No tumor seen.         ENDOSCOPY:  18 ERCP with papillotomy, balloon rotation of the biliary stricture, pathology brushings, ileum stent placement (Georges)  1.  Irregular 3 cm distal common bile duct stricture concerning for neoplastic disease. Biliary papillotomy, stricture dilatation by  through the scope balloon, cytology brushings performed and 10 Yi by 5 cm biliary stent placed.    2.  Dilated common hepatic duct to 15 mm when fully contrast filled.  Dilated intrahepatic biliary tree.    3.  No stones proximal to the stricture were identified.    4.  Slight dilatation of the distal pancreatic duct without a discrete stricture identified.    IMAGIN18 CT Abdomen Pelvis Stone Protocol   Findings  - LOWER THORAX: Clear. No effusions.  - LIVER: Homogeneous. No focal hepatic mass or ductal dilatation.  - GALLBLADDER: MINIMAL STRANDING AROUND REGION OF GALLBLADDER FOSSA  THAT MAY BE POSTSURGICAL.  - PANCREAS: Unremarkable. No mass or ductal dilatation.  - SPLEEN: Homogeneous. No splenomegaly.  - ADRENALS: No mass.  - KIDNEYS: No mass. No obstructive uropathy.  No evidence of urolithiasis.  - GI TRACT: SMALL HIATAL HERNIA.  - PERITONEUM: No free air. No free fluid or loculated fluid collections.  - MESENTERY: Unremarkable.  - LYMPH NODES: No lymphadenopathy.  - VASCULATURE: ATHEROSCLEROTIC VASCULAR CALCIFICATION.  - ABDOMINAL WALL: No focal hernia or mass.  - OTHER: None.  - BLADDER: No focal mass or significant wall thickening  - REPRODUCTIVE: Unremarkable as visualized.  - APPENDIX: Nondistended. No surrounding inflammation.  - BONES: No acute bony abnormality.     IMPRESSION:  - Minimal stranding around region of gallbladder fossa that may be postsurgical.     18  US Liver   FINDINGS:   - Visualized pancreas is unremarkable.   - The gallbladder is absent. No collection identified.   - The CBD measures 10.25829329699 mm    .  - The liver demonstrates normal echogenicity without focal lesion.    - No ascites demonstrated.        IMPRESSION:  - As above.    08-22-18 CT Abdomen Pelvis Stone Protocol   FINDINGS:   - Minimal bibasilar atelectasis.  - Hiatal hernia.  - The liver is homogeneous. There is no evidence of focal hepatic mass.  - The spleen is homogeneous.  - Stent is noted traversing the common bile duct.  - 3 mm nodule in the right lung on image 8 of the axial series.  - There is no adrenal enlargement.  - The kidneys show no evidence of hydronephrosis or hydroureter. I do not see any distal ureteral stones.   - Otherwise I do not see any free fluid or walled off fluid collections.  - There is moderate to large volume stool in the colon.  - There is no evidence of mesenteric or retroperitoneal adenopathy.     IMPRESSION:  1. Tiny nodule in the right lung base.  2. Minimal bibasilar atelectasis.  3. Moderate to large volume stool in the colon.     Other findings as above.    08-22-18 CT Abdomen Pelvis With Contrast   FINDINGS:   - Tiny left basilar nodule measuring 4 mm on image 14 of the axial series.  - Minimal bibasilar atelectasis.  - The liver is homogeneous. There is no evidence of focal hepatic mass  - The spleen is homogeneous  - Indistinct appearance of the pancreatic head. A biliary stent is present.  - Small left adrenal nodule is present.  - The kidneys show no evidence of hydronephrosis or hydroureter. I do not see any distal ureteral stones.   - Otherwise I do not see any free fluid or walled off fluid collections.  - Large volume stool is present in the colon.     IMPRESSION:  1. Slightly enlarged, indistinct appearance of the pancreatic head.  - Underlying neoplasm certainly not excluded but no delineable mass is present. There is a biliary stent.    2.  Tiny nodule in the left lung base.    3. Small left adrenal nodule.    4. Hiatal hernia.    5. Moderate to large volume stool in the colon.      09-11-18 CT Chest Without Contrast   FINDINGS:   - Minimal coronary artery calcifications present.  - No pericardial or pleural effusions.  - No parenchymal soft tissue nodules or masses. There are findings of COPD.     IMPRESSION:  - COPD.  - Hiatal hernia.  - Minimal coronary artery calcification.       09-11-18 CT Abdomen Pelvis Without Contrast   FINDINGS:   - Lung bases show mild increased interstitial markings.  - There is a hiatal hernia.  - The liver is homogeneous. There is no evidence of focal hepatic mass.  - The spleen is homogeneous.  - Mildly indistinct appearance of the pancreas. There is a biliary stent present. I cannot exclude mild degree of pancreatitis..  - There is no adrenal enlargement.  - The kidneys show no evidence of hydronephrosis or hydroureter. I do not see any distal ureteral stones.   - Otherwise I do not see any free fluid or walled off fluid collections.  - Large volume stool is present in the colon.  - Urinary bladder wall is slightly thickened.  - There is no evidence of mesenteric or retroperitoneal adenopathy.    IMPRESSION:  1. Mildly indistinct appearance of the proximal pancreas.  2. Urinary bladder wall thickening.  3. Large volume stool in the colon.    09-14-18 CT Abdomen Without Contrast   FINDINGS:   - Again noted is very mild indistinct appearance of the pancreatic head. Stent is stable in position.  - The liver is stable and homogeneous.  - Spleen is homogeneous.  - No adrenal enlargement.  - Moderate to large volume stool in the colon.     IMPRESSION:  - No significant interval change since the previous exam.     09-21-18 NM Pet Skull Base To Mid Thigh   FINDINGS:      HEAD/NECK:  - No FDG hypermetabolic neck adenopathy.  - No FDG hypermetabolic masses.     CHEST:   - No FDG hypermetabolic thoracic adenopathy.  - No FDG  hypermetabolic lung nodules or masses.  - Evaluation for tiny parenchymal nodules is somewhat limited on low dose CT secondary to respiratory motion.     ABDOMEN/PELVIS:   - There is hypermetabolic activity in the pancreatic head with a maximum SUV of 4.971.  - Physiologic FDG hypermetabolism seen throughout the GI tract.  - Physiologic FDG hypermetabolism seen throughout the mesentery, retroperitoneum and pelvis.     BONES:   - There are scattered foci of FDG hypermetabolism most suggestive of degenerative change seen throughout the axial skeleton. If concern persist for metastatic lesions of the bone, HDP Bone scan is recommended for further evaluation.     IMPRESSION:  - Abnormal hypermetabolic activity corresponding to area of indistinctness in the pancreatic head. Maximum SUV is 4.97.    1-20-19 CT CAP with contrast:  Today's study shows mild coronary artery calcifications.     There are bilateral pleural effusions, right greater than left.     There is bibasilar consolidation. I cannot exclude pneumonia.     No parenchymal masses are seen.     IMPRESSION:  Bibasilar effusions and bibasilar consolidation.     There are bibasilar pleural effusions and there is bibasilar  consolidation.     Patient has a percutaneous gastric tube.     There is pneumoperitoneum. This may be from the percutaneous gastric  tube. The tube appears to remain intraluminal but left lateral aspect  could extend beyond the confines of the bowel.     There is a perihepatic fluid collection which contains air and is  concerning for perihepatic abscess.     The liver is homogeneous. There is no evidence of focal hepatic mass     The spleen is homogeneous     There is no peripancreatic stranding or pancreatic head mass.     There is no adrenal enlargement.     The kidneys show no evidence of hydronephrosis or hydroureter. I do not  see any distal ureteral stones.      Small volume ascites is present.     There is no bowel wall thickening or  mesenteric stranding.             IMPRESSION:  :   1. Bibasilar effusions and bibasilar consolidation.  2. Loculated perihepatic fluid collection containing air. This is  concerning for an abscess.  3. Percutaneous gastric tube is present. The tip appears to remain  intraluminal. There is, however, single focus of pneumoperitoneum which  may be associated with gastric tube placement.  4. Small volume ascites.    CT A/P without Contrast 1-24-19:  The percutaneous drain placed on 01/11/2019 and the  perihepatic abscess has been removed. There is persistent loculated  perihepatic fluid although this has markedly improved since prior to  drain placement; the collection did measure 8 x 22 x 9 cm before  drainage as one large collection and now is split into two possibly  separate smaller collections, the more posterior of the two measuring 2  x 10 x 2 cm and the more superior 4 x 6 x 3 cm (the collections may be  narrowly connected).     Percutaneous gastrojejunostomy is in place; status post Whipple. There  is a small amount of low density abdominopelvic free fluid. There is gas  in the nondependent portion of the urinary bladder, probably from recent  instrumentation. There is no evidence of bowel obstruction. Only a tiny  locule of free air persists in the mid abdomen deep to the midline  incision, improved. The kidneys, adrenal glands, pancreas and spleen are  unchanged.     There is mild body wall edema and subcutaneous gas from recent  medication injections. Small volume right and trace volume left pleural  effusions. There is near complete collapse of the right lower lobe but  there is only subsegmental atelectasis at the left lung base. No  pertinent osseous abnormality is seen.     IMPRESSION:  1. Marked improvement in the perihepatic subcapsular abscess. Drainage  catheter has been removed.  2. Unchanged low density free fluid. Unchanged small volume right and  trace left pleural effusions.    CTCAP  02-26-19:  Findings  LUNGS: BIBASILAR LUNG FIBROTIC CHANGE.  HEART: Unremarkable.  PERICARDIUM: No effusion.  MEDIASTINUM: No masses. No enlarged lymph nodes.  No fluid collections.  PLEURA: TINY LEFT PLEURAL EFFUSION.  MAJOR AIRWAYS: Clear. No intrinsic mass.  VASCULATURE: No evidence of aneurysm.     VISUALIZED UPPER ABDOMEN:  LIVER: Homogeneous. No focal hepatic mass or ductal dilatation.  SPLEEN: Homogeneous. No splenomegaly.  ADRENALS: No mass.  KIDNEYS: No mass. No obstructive uropathy.  No evidence of urolithiasis.  GI TRACT: Non-dilated. No definite wall thickening  PERITONEUM: No free air. No free fluid or loculated fluid collections.  ABDOMINAL WALL: No focal hernia or mass.       OTHER: None.     BONES: No acute bony abnormality.     IMPRESSION:  Now only tiny pleural effusions.    Findings  LOWER THORAX: Clear. No effusions.     ABDOMEN:  LIVER: SUBCAPSULE LIVER COLLECTION MEASURING ABOUT 4.8 CM THAT WAS ABOUT 7.2 CM.  GALLBLADDER: No dilation or stone identified.  PANCREAS: Unremarkable. No mass or ductal dilatation.  SPLEEN: Homogeneous. No splenomegaly.  ADRENALS: No mass.  KIDNEYS: No mass. No obstructive uropathy.  No evidence of urolithiasis.  GI TRACT: Non-dilated. No definite wall thickening.  PERITONEUM: TINY ASCITES.  MESENTERY: Unremarkable.  LYMPH NODES: No lymphadenopathy.  VASCULATURE: No evidence of aneurysm.  ABDOMINAL WALL: No focal hernia or mass.     OTHER: None.     PELVIS:  BLADDER: No focal mass or significant wall thickening  REPRODUCTIVE: Unremarkable as visualized.  APPENDIX: Nondistended. No surrounding inflammation.  BONES: No acute bony abnormality.     IMPRESSION:  Still tiny ascites. Decreased size of a subhepatic collection.      PET/CT 03-29-19:  FINDINGS:      HEAD/NECK:  No FDG hypermetabolic neck adenopathy.  No FDG hypermetabolic masses.     CHEST:   No FDG hypermetabolic thoracic adenopathy.  No FDG hypermetabolic lung nodules or masses.  Evaluation for tiny  parenchymal nodules is somewhat limited on low dose  CT secondary to respiratory motion.     ABDOMEN/PELVIS:   Physiologic FDG hypermetabolism seen throughout the solid abdominal  organs.  There is hypermetabolic activity along the anterior margin of the liver.  This appears to correspond to trace subcapsular fluid. The maximum SUV  was 5.385. This is abnormal.  There is a tiny nodular density anterior to the right of midline in the  peritoneum on image 234 of the axial series. This shows maximum SUV of  3.057. Also hypermetabolic activity to the left of midline adjacent to  surgical clips in the anterior abdomen. This shows a maximum SUV of  4.206.     Physiologic FDG hypermetabolism seen throughout the mesentery,  retroperitoneum and pelvis.     BONES: There are scattered foci of FDG hypermetabolism most suggestive  of degenerative change seen throughout the axial skeleton. If concern  persist for metastatic lesions of the bone, HDP Bone scan is recommended  for further evaluation.     IMPRESSION:     1. Hypermetabolic activity along the anterior margin of the liver which  appears to correspond with a small amount of subcapsular fluid but  concerning for active disease.  2. Tiny nodular density or possibly trace fluid in the peritoneum to the  right of midline as described above.  3. Hypermetabolic activity possibly bowel activity in the lower abdomen  to the left of midline adjacent to the surgical clip on image 247 of the  axial series. All of these areas could represent residual or metastatic  Disease.    Ct Lumbar Spine Without Contrast     Result Date: 6/4/2019  Persistent degenerative disc change in the lumbar spine, most prominent at L4-L5, not significantly changed radiographically from the earlier exam in 2017.  359.23 mGy.cm The radiation dose reduction device was utilized for each scan per the ALARA (as low as reasonably achievable) protocol.  This report was finalized on 6/4/2019 8:00 AM by Dr. Nobles  Jonatan RICHARDS MD.      RECENT LABS:  Lab Results   Component Value Date    WBC 19.80 (H) 06/24/2019    HGB 9.1 (L) 06/24/2019    HCT 28.1 (L) 06/24/2019    MCV 90.6 06/24/2019    RDW 17.0 (H) 06/24/2019    PLT 91 (L) 06/24/2019    NEUTRORELPCT 77.3 (H) 06/17/2019    LYMPHORELPCT 14.4 (L) 06/17/2019    MONORELPCT 6.9 06/17/2019    EOSRELPCT 0.2 (L) 06/17/2019    BASORELPCT 0.2 06/17/2019    NEUTROABS 15.05 (H) 06/24/2019    LYMPHSABS 0.75 06/17/2019       Lab Results   Component Value Date     06/24/2019    K 4.1 06/24/2019    CO2 24.2 06/24/2019     06/24/2019    BUN 17 06/24/2019    CREATININE 1.24 06/24/2019    EGFRIFNONA 57 (L) 06/24/2019    EGFRIFAFRI  09/02/2016      Comment:      <15 Indicative of kidney failure.    GLUCOSE 199 (H) 06/24/2019    CALCIUM 9.2 06/24/2019    ALKPHOS 179 (H) 06/24/2019    AST 14 06/24/2019    ALT 15 06/24/2019    BILITOT 0.2 06/24/2019    ALBUMIN 3.28 (L) 06/24/2019    PROTEINTOT 6.3 06/24/2019    MG 2.0 04/24/2019    PHOS 3.3 04/24/2019     Lab Results   Component Value Date    FERRITIN 435.00 (H) 02/20/2019    IRON 11 (L) 02/20/2019    TIBC 196 (L) 02/20/2019    LABIRON 6 (L) 02/20/2019    AFYYNHPN07 1,361 (H) 02/20/2019    FOLATE 19.70 02/20/2019       Lab Results   Component Value Date     76.1 (H) 04/23/2019     129.6 (H) 03/20/2019     85 (H) 02/20/2019     34 01/22/2019     104 (H) 01/07/2019     1349 (H) 12/19/2018     1089 (H) 12/10/2018     1576 (H) 11/13/2018     5149 (H) 10/19/2018     7602 (H) 09/25/2018     3636 (H) 09/07/2018     4032 (H) 08/15/2018     ASSESSMENT & PLAN:  Todd Phillips is a very pleasant 69 y.o. male with Stage III moderately differentiated adenocarcinoma of the head of the pancreas with involvement of the duodenum ([id number]), now with evidence of anastomotic recurrence as well as involvement of the liver and rising .    1.  Pancreatic cancer:  -  Initially attempted  neoadjuvant chemotherapy with Gemcitabine and Abraxane and he completed one cycle.  He became very weak and had to have TPN support.   -  He underwent surgical resection 12-31-18 and had Stage III disease ([id number]) moderately differentiated adenocarcinoma of the pancreas.  Tumor was 3.7 cm in maximal dimension and 5/15 associated LNs were involved.  Uncinate and SMA margins were involved.    -  There has been question about whether he should take further therapy (chemotherapy and / or radiation), but he had a very slow recovery and wasn't able to take this promptly after surgery.  -   then started to rise, he developed obstructive symptoms and he has PET positivity at the small bowel anastomosis along with abnormal emptying study.  He also has an area of PET uptake along the anterior liver.  -  All of this is concerning for recurrent disease.  -  Recommended repeat trial of chemotherapy with Gemcitabine / Abraxane.    -  He started Gemcitabine / Abraxane.  He initially seemed to tolerate it well but unfortunately, developed pancytopenia / febrile neutropenia. Once his acute symptoms resolved, he was restarted with reduced dose of 20% and D15 was eliminated. With treatment, he has had increased fatigue/weakness. Therefore, reduced dose further to 30% with C4 and he reports tolerating this much better. Will continue with current treatment and plan to follow up again after cycle 5 for another symptom/toxicity check. CA 19-9 from today pending.    2. LBP:  -Currently improved/tolerable. We increased his pain medication to oxycodone 10 mg (1-2 tabs) every 4 hours as needed. He has been taking 1 tablet every 4 hours which has been better controlling his pain. However, he has noticed increased pain around his neulasta injection. Encouraged him to liberalize use to help control his symptoms.  Continue Claritin BID around treatment to help with bony pain related to neulasta.   -U/A was previously checked and normal.  Also obtained CT of lumbar spine to further evaluate which showed persistent degenerative disc changes of lumbar spine, most prominent at L4-5 with no significant changed since previous exam in 2017.   -Will monitor.     3.  N/V/Dehydration:  - Currently doing better in this regard. He denies nausea at present. Continue Zofran TID around treatment and compazine as needed.     -  Continue IVF to 3x/week which has been helpful.   -Will monitor.     4.  Neoplasm related pain:  - Continue Oxycodone PRN as above.  Encouraged him to liberalize use to help control his symptoms.    5.  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  He previously took oral iron but did not absorb it.    - He received Feraheme. Will recheck iron panel and ferritin with his next visit.     6.  Constipation:  - Currently denies. Continue Senna/Colace and Miralax prn.      7.   History of Atrial fibrillation:  Chronically anticoagulated on Eliquis.      8.  Prophylaxis:  Has had 2018 influenza vaccine.      9.  ACO Quality measures  - Todd Phillips does not use tobacco products.  -- Todd Phillips received 2018 flu vaccine.  - Current outpatient and discharge medications have been reconciled for the patient.  Reviewed by: VIMAL Rolle    I spent 40 minutes with Todd Phillips today.  More than 50% of the time was spent in counseling / coordination of care for the above problems.    Electronically Signed by: VIMAL Rolle      CC:   MD Miquel Quintana MD Aaron House, MD Michael Simons, MD

## 2019-06-25 ENCOUNTER — OFFICE VISIT (OUTPATIENT)
Dept: CARDIOLOGY | Facility: CLINIC | Age: 69
End: 2019-06-25

## 2019-06-25 VITALS
HEART RATE: 90 BPM | SYSTOLIC BLOOD PRESSURE: 100 MMHG | HEIGHT: 72 IN | BODY MASS INDEX: 22.75 KG/M2 | OXYGEN SATURATION: 98 % | DIASTOLIC BLOOD PRESSURE: 58 MMHG | WEIGHT: 168 LBS

## 2019-06-25 DIAGNOSIS — I48.91 ATRIAL FIBRILLATION WITH RVR (HCC): ICD-10-CM

## 2019-06-25 DIAGNOSIS — I48.91 ATRIAL FIBRILLATION, UNSPECIFIED TYPE (HCC): Primary | Chronic | ICD-10-CM

## 2019-06-25 PROCEDURE — 99213 OFFICE O/P EST LOW 20 MIN: CPT | Performed by: INTERNAL MEDICINE

## 2019-06-25 NOTE — PROGRESS NOTES
subjective     Chief Complaint   Patient presents with   • Atrial Fibrillation     Follow up, Pt has no complaints     History of Present Illness  Patient is 69 years old white male who is here for follow-up.  Patient has history of paroxysmal atrial relation patient is anticoagulated with Eliquis.  He is maintaining sinus rhythm with Tambocor 50 mg twice a day  Hyperuricemia is being treated with Zyloprim 10 mg daily    Patient had history of pancreatic CA is post Whipple chemotherapy.  Syncope has been weak and tired.  Blood pressure has been low it is getting IV infusions at home.  He said he is feeling significantly better lately.    Past Surgical History:   Procedure Laterality Date   • BILE DUCT STENT PLACEMENT      [facility] 2 weeks ago    • CARDIAC CATHETERIZATION  11/01/1999   • CARDIOVASCULAR STRESS TEST  09/2012   • CHOLECYSTECTOMY N/A 8/10/2018    Procedure: CHOLECYSTECTOMY LAPAROSCOPIC;  Surgeon: Ronak Downs MD;  Location: [facility] OR;  Service: General   • COLONOSCOPY     • CYSTOSCOPY BLADDER STONE LITHOTRIPSY     • ECHO - CONVERTED  09/2012   • ERCP N/A 8/14/2018    Procedure: ENDOSCOPIC RETROGRADE CHOLANGIOPANCREATOGRAPHY WITH PAPILLOTOMY;  Surgeon: Scot Su MD;  Location: [facility] OR;  Service: Gastroenterology   • ERCP N/A 10/1/2018    Procedure: ENDOSCOPIC RETROGRADE CHOLANGIOPANCREATOGRAPHY;  Surgeon: Jefry Luna MD;  Location:  [facility];  Service: Gastroenterology   • KIDNEY STONE SURGERY     • KIDNEY STONE SURGERY      open abdominal surgery   • PORTACATH PLACEMENT Right 10/23/2018    Procedure: INSERTION OF PORTACATH;  Surgeon: Ronak Downs MD;  Location: [facility] OR;  Service: General   • TONSILLECTOMY     • UPPER GASTROINTESTINAL ENDOSCOPY  08/30/2012   • WHIPPLE PROCEDURE N/A 12/31/2018    Procedure: WHIPPLE PROCEDURE, BIOPSY OF LIVER, PORTAL VEIN RESECTION AND REPAIR, G/J TUBE PLACEMENT;  Surgeon: Miquel Brody MD;   Location: [facility] OR;  Service: General     Family History   Problem Relation Age of Onset   • Heart attack Mother    • Heart disease Mother    • Stroke Mother    • Kidney disease Mother    • Heart failure Mother    • Lung disease Father    • Tuberculosis Father    • Diabetes Sister    • Heart attack Brother    • Heart failure Brother    • Heart disease Brother    • Diabetes Sister    • No Known Problems Brother    • No Known Problems Brother      Past Medical History:   Diagnosis Date   • Antral gastritis    • Asthma    • Atrial fibrillation (CMS/[facility])     treated with oral blood thinner   • Chest pain    • COPD (chronic obstructive pulmonary disease) (CMS/[facility])    • Coronary artery disease     no stents   • Diabetes mellitus (CMS/[facility])     type 2 - checks sugar 4 times per day    • Duodenitis    • Elevated cholesterol    • Emphysema of lung (CMS/[facility])    • Gallbladder disease     removed    • GERD (gastroesophageal reflux disease)    • Hard to intubate     busted lip last time   • Hyperlipidemia    • Hypertension    • Jaundice    • Kidney stone    • Kidney stones     had lithotripsy and passed 36 kidney stones as well as had one surgically removed.   • Malignant neoplasm of head of pancreas (CMS/[facility]) 9/5/2018   • Nausea    • Obstructive sleep apnea on CPAP     compliant with machine    • Palpitations    • Pneumonia 08/2018   • Reflux esophagitis    • Renal failure     stage 3 kidney disease   • Sepsis (CMS/[facility])      Patient Active Problem List   Diagnosis   • Hyperlipidemia   • Gout without tophus   • Anxiety and depression   • Primary insomnia   • Gastroesophageal reflux disease without esophagitis   • Nonobstructive atherosclerosis of coronary artery   • CKD stage 3 secondary to diabetes (CMS/[facility])   • DM2 (diabetes mellitus, type 2) (CMS/[facility])   • Paroxysmal atrial fibrillation   • Chronic anticoagulation, Eliquis   • Encounter for monitoring flecainide therapy   • History of Malignant neoplasm of head of pancreas S/P  Whipple and chemotherapy (ongoing)  (CMS/[facility])   • Bacteremia due to Escherichia coli   • Biliary obstruction   • Thrombocytopenia (CMS/[facility])   • Anemia due to chemotherapy, chronic disease, and possible iron deficiency.  Transfuse 4/21/19   • Fever   • Post-operative confusion and somnolence, likely due to oversedation   • PAF on home Eliquis (CMS/[facility])   • Adult necrotizing enterocolitis (CMS/[facility])   • Encephalopathy   • Intra-abdominal abscess (S/P Percutaneous drain)   • Metabolic encephalopathy   • Dehydration   • Nausea   • Iron deficiency anemia secondary to inadequate dietary iron intake   • Malabsorption of iron   • Pancytopenia due to antineoplastic chemotherapy (CMS/[facility])   • Minimal pulmonary infiltrate left lower lobe.  Cannot rule out early pneumonia   • Possible bacterial enteritis seen on CT       Social History     Tobacco Use   • Smoking status: Never Smoker   • Smokeless tobacco: Never Used   Substance Use Topics   • Alcohol use: No   • Drug use: No       Allergies   Allergen Reactions   • Chlorhexidine Hives and Rash   • Contrast Dye Other (See Comments)     Can't have due to kidney failure per family   • Fentanyl Confusion and Unknown (See Comments)     Patient family reports extreme symptoms with fentanyl. Including confusion, restlessness, irritability an heavy sedation.       Current Outpatient Medications on File Prior to Visit   Medication Sig   • albuterol (PROVENTIL HFA;VENTOLIN HFA) 108 (90 BASE) MCG/ACT inhaler Inhale 2 puffs Every 4 (Four) Hours As Needed for Wheezing or Shortness of Air.   • albuterol (PROVENTIL) (2.5 MG/3ML) 0.083% nebulizer solution Take 2.5 mg by nebulization Every 6 (Six) Hours As Needed for Wheezing or Shortness of Air.   • allopurinol (ZYLOPRIM) 100 MG tablet Take 1 tablet by mouth Daily.   • apixaban (ELIQUIS) 5 MG tablet tablet Take 1 tablet by mouth Every 12 (Twelve) Hours.   • cholecalciferol (VITAMIN D3) 1000 units tablet Take 1,000 Units by mouth Daily.   •  dexamethasone (DECADRON) 4 MG tablet Take 2 tablets by mouth in the morning on days 2, 3, and 4 after chemo.   • flecainide (TAMBOCOR) 50 MG tablet Take 50 mg by mouth Every 12 (Twelve) Hours.   • FLUoxetine (PROzac) 20 MG capsule Take 20 mg by mouth 2 (Two) Times a Day.   • insulin lispro (humaLOG) 100 UNIT/ML injection Inject  under the skin into the appropriate area as directed 3 (Three) Times a Day As Needed (before meals prn (2 units if > 150-199, 4 units if > 199)).   • lactobacillus acidophilus (RISAQUAD) capsule capsule Take 1 capsule by mouth Daily.   • mometasone (ASMANEX) 220 MCG/INH inhaler Inhale 2 puffs Every Night.   • montelukast (SINGULAIR) 10 MG tablet Take 10 mg by mouth Every Night.   • nitroglycerin (NITROSTAT) 0.4 MG SL tablet Place 0.4 mg under the tongue as needed for chest pain.   • omeprazole (priLOSEC) 40 MG capsule Take 40 mg by mouth Daily.   • oxyCODONE (ROXICODONE) 10 MG tablet Take 1-2 tabs every 4-6 hours as needed for pain   • polyethylene glycol (MIRALAX) packet Take 17 g by mouth Daily As Needed.   • prochlorperazine (COMPAZINE) 10 MG tablet Take 1 tablet by mouth Every 6 (Six) Hours As Needed for Nausea or Vomiting.   • promethazine (PHENERGAN) 12.5 MG tablet Take 1 tablet by mouth Every 6 (Six) Hours As Needed for Nausea or Vomiting.   • promethazine (PHENERGAN) 25 MG suppository Insert 1 suppository into the rectum Every 6 (Six) Hours As Needed for Nausea or Vomiting.   • rifaximin (XIFAXAN) 550 MG tablet Take 550 mg by mouth 2 (Two) Times a Day.   • Tiotropium Bromide-Olodaterol 2.5-2.5 MCG/ACT aerosol solution Inhale 2 puffs Daily.   • ondansetron (ZOFRAN) 8 MG tablet Take 1 tablet by mouth Every 8 (Eight) Hours As Needed for Nausea or Vomiting.   • pancrelipase, Lip-Prot-Amyl, (CREON) 41696 units capsule delayed-release particles capsule Take 12,000 units of lipase by mouth 3 (Three) Times a Day With Meals.     Current Facility-Administered Medications on File Prior to Visit  "  Medication   • sodium chloride 0.9 % infusion  - ADS Override Pull   • sodium chloride 0.9 % infusion  - ADS Override Pull         The following portions of the patient's history were reviewed and updated as appropriate: allergies, current medications, past family history, past medical history, past social history, past surgical history and problem list.    Review of Systems   Constitution: Positive for weakness and malaise/fatigue.   HENT: Negative.  Negative for congestion.    Eyes: Negative.    Cardiovascular: Negative.  Negative for chest pain, cyanosis, dyspnea on exertion, irregular heartbeat, leg swelling, near-syncope, orthopnea, palpitations, paroxysmal nocturnal dyspnea and syncope.   Respiratory: Negative.  Negative for shortness of breath.    Hematologic/Lymphatic: Negative.    Musculoskeletal: Negative.    Gastrointestinal: Positive for nausea.   Neurological: Negative for headaches.          Objective:     /58 (BP Location: Left arm, Patient Position: Sitting, Cuff Size: Adult)   Pulse 90   Ht 182.9 cm (72\")   Wt 76.2 kg (168 lb)   SpO2 98%   BMI 22.78 kg/m²   Physical Exam   Constitutional: He appears well-developed and well-nourished. No distress.   HENT:   Head: Normocephalic and atraumatic.   Mouth/Throat: Oropharynx is clear and moist. No oropharyngeal exudate.   Eyes: Conjunctivae and EOM are normal. Pupils are equal, round, and reactive to light. No scleral icterus.   Neck: Normal range of motion. Neck supple. No JVD present. No tracheal deviation present. No thyromegaly present.   Cardiovascular: Normal rate, regular rhythm, normal heart sounds and intact distal pulses. PMI is not displaced. Exam reveals no gallop, no friction rub and no decreased pulses.   No murmur heard.  Pulses:       Carotid pulses are 3+ on the right side, and 3+ on the left side.       Radial pulses are 3+ on the right side, and 3+ on the left side.   Pulmonary/Chest: Effort normal and breath sounds normal. " No respiratory distress. He has no wheezes. He has no rales. He exhibits no tenderness.   Abdominal: Soft. Bowel sounds are normal. He exhibits no distension, no abdominal bruit and no mass. There is no splenomegaly or hepatomegaly. There is no tenderness. There is no rebound and no guarding.   Musculoskeletal: Normal range of motion. He exhibits no edema, tenderness or deformity.   Lymphadenopathy:     He has no cervical adenopathy.   Neurological: He is alert. He has normal reflexes. No cranial nerve deficit. He exhibits normal muscle tone. Coordination normal.   Skin: Skin is warm and dry. No rash noted. He is not diaphoretic. No erythema.   Psychiatric: He has a normal mood and affect. His behavior is normal. Judgment and thought content normal.         Lab Review  Lab Results   Component Value Date     06/24/2019    K 4.1 06/24/2019     06/24/2019    BUN 17 06/24/2019    CREATININE 1.24 06/24/2019    GLUCOSE 199 (H) 06/24/2019    CALCIUM 9.2 06/24/2019    ALT 15 06/24/2019    ALKPHOS 179 (H) 06/24/2019    LABIL2 1.5 03/25/2016     Lab Results   Component Value Date    CKTOTAL 24 (L) 02/01/2019     Lab Results   Component Value Date    WBC 19.80 (H) 06/24/2019    HGB 9.1 (L) 06/24/2019    HCT 28.1 (L) 06/24/2019    PLT 91 (L) 06/24/2019     Lab Results   Component Value Date    INR 1.24 (H) 04/21/2019    INR 1.24 (H) 04/21/2019    INR 1.35 (H) 01/24/2019     Lab Results   Component Value Date    MG 2.0 04/24/2019     Lab Results   Component Value Date    PSA 1.450 07/11/2017    TSH 1.502 11/18/2018     Lab Results   Component Value Date    BNP 50.0 01/26/2019     Lab Results   Component Value Date    CHLPL 144 03/25/2016    CHOL 53 01/16/2019    TRIG 57 01/21/2019    HDL 20 (L) 01/16/2019    VLDL 16.8 01/16/2019    LDLHDL 0.81 01/16/2019     Lab Results   Component Value Date    LDL 16 01/16/2019    LDL 52 11/18/2018       Procedures       I personally viewed and interpreted the patient's LAB data          Assessment:     1. Atrial fibrillation, unspecified type (CMS/HCC)    2. PAF on home Eliquis (CMS/[facility])          Plan:     Patient is doing very well from cardiac standpoint.  Lipid panel has been normal.  He does not require any therapy for hyperlipidemia.  Actually his lipids is quite low because of poor nutrition.  Blood pressure is also running low he is getting home IV infusion therapy.  Eliquis was continued for paroxysmal atrial fibrillation  Was also continued.  No change in therapy was made.  Follow-up scheduled        No Follow-up on file.

## 2019-07-01 ENCOUNTER — LAB (OUTPATIENT)
Dept: ONCOLOGY | Facility: CLINIC | Age: 69
End: 2019-07-01

## 2019-07-01 ENCOUNTER — INFUSION (OUTPATIENT)
Dept: ONCOLOGY | Facility: HOSPITAL | Age: 69
End: 2019-07-01
Attending: INTERNAL MEDICINE

## 2019-07-01 VITALS
OXYGEN SATURATION: 98 % | SYSTOLIC BLOOD PRESSURE: 93 MMHG | TEMPERATURE: 98.2 F | HEART RATE: 90 BPM | HEIGHT: 72 IN | BODY MASS INDEX: 21.81 KG/M2 | DIASTOLIC BLOOD PRESSURE: 59 MMHG | WEIGHT: 161 LBS | RESPIRATION RATE: 16 BRPM

## 2019-07-01 DIAGNOSIS — C25.0 MALIGNANT NEOPLASM OF HEAD OF PANCREAS (HCC): ICD-10-CM

## 2019-07-01 DIAGNOSIS — C25.0 MALIGNANT NEOPLASM OF HEAD OF PANCREAS (HCC): Primary | ICD-10-CM

## 2019-07-01 LAB
ALBUMIN SERPL-MCNC: 3.3 G/DL (ref 3.5–5.2)
ALBUMIN/GLOB SERPL: 1 G/DL
ALP SERPL-CCNC: 193 U/L (ref 39–117)
ALT SERPL W P-5'-P-CCNC: 15 U/L (ref 1–41)
ANION GAP SERPL CALCULATED.3IONS-SCNC: 11.6 MMOL/L (ref 5–15)
AST SERPL-CCNC: 17 U/L (ref 1–40)
BILIRUB SERPL-MCNC: 0.2 MG/DL (ref 0.2–1.2)
BUN BLD-MCNC: 19 MG/DL (ref 8–23)
BUN/CREAT SERPL: 15.4 (ref 7–25)
CALCIUM SPEC-SCNC: 9.3 MG/DL (ref 8.6–10.5)
CHLORIDE SERPL-SCNC: 103 MMOL/L (ref 98–107)
CO2 SERPL-SCNC: 25.4 MMOL/L (ref 22–29)
CREAT BLD-MCNC: 1.23 MG/DL (ref 0.76–1.27)
DEPRECATED RDW RBC AUTO: 54.1 FL (ref 37–54)
ERYTHROCYTE [DISTWIDTH] IN BLOOD BY AUTOMATED COUNT: 16.9 % (ref 12.3–15.4)
GFR SERPL CREATININE-BSD FRML MDRD: 58 ML/MIN/1.73
GLOBULIN UR ELPH-MCNC: 3.2 GM/DL
GLUCOSE BLD-MCNC: 176 MG/DL (ref 65–99)
HCT VFR BLD AUTO: 29.6 % (ref 37.5–51)
HGB BLD-MCNC: 9.3 G/DL (ref 13–17.7)
LYMPHOCYTES # BLD MANUAL: 1.52 10*3/MM3 (ref 0.7–3.1)
LYMPHOCYTES NFR BLD MANUAL: 14 % (ref 19.6–45.3)
LYMPHOCYTES NFR BLD MANUAL: 7 % (ref 5–12)
MCH RBC QN AUTO: 29.4 PG (ref 26.6–33)
MCHC RBC AUTO-ENTMCNC: 31.4 G/DL (ref 31.5–35.7)
MCV RBC AUTO: 93.7 FL (ref 79–97)
MONOCYTES # BLD AUTO: 0.76 10*3/MM3 (ref 0.1–0.9)
NEUTROPHILS # BLD AUTO: 8.59 10*3/MM3 (ref 1.7–7)
NEUTROPHILS NFR BLD MANUAL: 73 % (ref 42.7–76)
NEUTS BAND NFR BLD MANUAL: 6 % (ref 0–5)
PLAT MORPH BLD: NORMAL
PLATELET # BLD AUTO: 332 10*3/MM3 (ref 140–450)
PMV BLD AUTO: 9.2 FL (ref 6–12)
POIKILOCYTOSIS BLD QL SMEAR: ABNORMAL
POTASSIUM BLD-SCNC: 4 MMOL/L (ref 3.5–5.2)
PROT SERPL-MCNC: 6.5 G/DL (ref 6–8.5)
RBC # BLD AUTO: 3.16 10*6/MM3 (ref 4.14–5.8)
SCAN SLIDE: NORMAL
SCHISTOCYTES BLD QL SMEAR: ABNORMAL
SODIUM BLD-SCNC: 140 MMOL/L (ref 136–145)
WBC NRBC COR # BLD: 10.87 10*3/MM3 (ref 3.4–10.8)

## 2019-07-01 PROCEDURE — 85007 BL SMEAR W/DIFF WBC COUNT: CPT | Performed by: INTERNAL MEDICINE

## 2019-07-01 PROCEDURE — 25010000002 PACLITAXEL PROTEIN-BOUND PART PER 1 MG: Performed by: INTERNAL MEDICINE

## 2019-07-01 PROCEDURE — 80053 COMPREHEN METABOLIC PANEL: CPT | Performed by: INTERNAL MEDICINE

## 2019-07-01 PROCEDURE — 96417 CHEMO IV INFUS EACH ADDL SEQ: CPT

## 2019-07-01 PROCEDURE — 96367 TX/PROPH/DG ADDL SEQ IV INF: CPT

## 2019-07-01 PROCEDURE — 96375 TX/PRO/DX INJ NEW DRUG ADDON: CPT

## 2019-07-01 PROCEDURE — 25010000002 GEMCITABINE HCL 2 GM/20ML SOLUTION 20 ML VIAL: Performed by: INTERNAL MEDICINE

## 2019-07-01 PROCEDURE — 25010000002 FOSAPREPITANT PER 1 MG: Performed by: INTERNAL MEDICINE

## 2019-07-01 PROCEDURE — 25010000002 DEXAMETHASONE SODIUM PHOSPHATE 20 MG/5ML SOLUTION 5 ML VIAL: Performed by: INTERNAL MEDICINE

## 2019-07-01 PROCEDURE — 96368 THER/DIAG CONCURRENT INF: CPT

## 2019-07-01 PROCEDURE — 85025 COMPLETE CBC W/AUTO DIFF WBC: CPT | Performed by: INTERNAL MEDICINE

## 2019-07-01 PROCEDURE — 96413 CHEMO IV INFUSION 1 HR: CPT

## 2019-07-01 RX ORDER — SODIUM CHLORIDE 0.9 % (FLUSH) 0.9 %
10 SYRINGE (ML) INJECTION AS NEEDED
Status: CANCELLED | OUTPATIENT
Start: 2019-07-09

## 2019-07-01 RX ORDER — PACLITAXEL 100 MG/20ML
87.5 INJECTION, POWDER, LYOPHILIZED, FOR SUSPENSION INTRAVENOUS ONCE
Status: CANCELLED | OUTPATIENT
Start: 2019-07-01

## 2019-07-01 RX ORDER — PACLITAXEL 100 MG/20ML
170 INJECTION, POWDER, LYOPHILIZED, FOR SUSPENSION INTRAVENOUS ONCE
Status: COMPLETED | OUTPATIENT
Start: 2019-07-01 | End: 2019-07-01

## 2019-07-01 RX ORDER — SODIUM CHLORIDE 9 MG/ML
250 INJECTION, SOLUTION INTRAVENOUS ONCE
Status: CANCELLED | OUTPATIENT
Start: 2019-07-01

## 2019-07-01 RX ORDER — SODIUM CHLORIDE 9 MG/ML
250 INJECTION, SOLUTION INTRAVENOUS ONCE
Status: COMPLETED | OUTPATIENT
Start: 2019-07-01 | End: 2019-07-01

## 2019-07-01 RX ORDER — PACLITAXEL 100 MG/20ML
87.5 INJECTION, POWDER, LYOPHILIZED, FOR SUSPENSION INTRAVENOUS ONCE
Status: CANCELLED | OUTPATIENT
Start: 2019-07-09

## 2019-07-01 RX ORDER — SODIUM CHLORIDE 9 MG/ML
250 INJECTION, SOLUTION INTRAVENOUS ONCE
Status: CANCELLED | OUTPATIENT
Start: 2019-07-09

## 2019-07-01 RX ORDER — OXYCODONE HYDROCHLORIDE 10 MG/1
TABLET ORAL
Qty: 100 TABLET | Refills: 0 | Status: SHIPPED | OUTPATIENT
Start: 2019-07-01 | End: 2019-07-30 | Stop reason: SDUPTHER

## 2019-07-01 RX ADMIN — DEXAMETHASONE SODIUM PHOSPHATE 12 MG: 4 INJECTION, SOLUTION INTRAMUSCULAR; INTRAVENOUS at 10:00

## 2019-07-01 RX ADMIN — GEMCITABINE 1400 MG: 100 INJECTION, SOLUTION INTRAVENOUS at 12:30

## 2019-07-01 RX ADMIN — SODIUM CHLORIDE 250 ML: 9 INJECTION, SOLUTION INTRAVENOUS at 10:00

## 2019-07-01 RX ADMIN — PACLITAXEL 170 MG: 100 INJECTION, POWDER, LYOPHILIZED, FOR SUSPENSION INTRAVENOUS at 11:50

## 2019-07-01 RX ADMIN — SODIUM CHLORIDE, PRESERVATIVE FREE 500 UNITS: 5 INJECTION INTRAVENOUS at 13:15

## 2019-07-01 RX ADMIN — SODIUM CHLORIDE 150 MG: 9 INJECTION, SOLUTION INTRAVENOUS at 10:15

## 2019-07-08 ENCOUNTER — APPOINTMENT (OUTPATIENT)
Dept: ONCOLOGY | Facility: HOSPITAL | Age: 69
End: 2019-07-08
Attending: INTERNAL MEDICINE

## 2019-07-09 ENCOUNTER — APPOINTMENT (OUTPATIENT)
Dept: ONCOLOGY | Facility: HOSPITAL | Age: 69
End: 2019-07-09
Attending: INTERNAL MEDICINE

## 2019-07-09 ENCOUNTER — OFFICE VISIT (OUTPATIENT)
Dept: ONCOLOGY | Facility: CLINIC | Age: 69
End: 2019-07-09

## 2019-07-09 ENCOUNTER — LAB (OUTPATIENT)
Dept: ONCOLOGY | Facility: CLINIC | Age: 69
End: 2019-07-09

## 2019-07-09 ENCOUNTER — INFUSION (OUTPATIENT)
Dept: ONCOLOGY | Facility: HOSPITAL | Age: 69
End: 2019-07-09
Attending: INTERNAL MEDICINE

## 2019-07-09 VITALS
BODY MASS INDEX: 21.9 KG/M2 | WEIGHT: 161.5 LBS | TEMPERATURE: 98.6 F | SYSTOLIC BLOOD PRESSURE: 80 MMHG | OXYGEN SATURATION: 93 % | DIASTOLIC BLOOD PRESSURE: 54 MMHG | RESPIRATION RATE: 20 BRPM | HEART RATE: 99 BPM

## 2019-07-09 VITALS
DIASTOLIC BLOOD PRESSURE: 54 MMHG | RESPIRATION RATE: 20 BRPM | OXYGEN SATURATION: 93 % | HEART RATE: 99 BPM | WEIGHT: 161.5 LBS | SYSTOLIC BLOOD PRESSURE: 80 MMHG | BODY MASS INDEX: 21.9 KG/M2 | TEMPERATURE: 98.6 F

## 2019-07-09 DIAGNOSIS — C25.0 MALIGNANT NEOPLASM OF HEAD OF PANCREAS (HCC): Primary | ICD-10-CM

## 2019-07-09 DIAGNOSIS — R97.8 ELEVATED CA 19-9 LEVEL: ICD-10-CM

## 2019-07-09 DIAGNOSIS — C25.0 MALIGNANT NEOPLASM OF HEAD OF PANCREAS (HCC): ICD-10-CM

## 2019-07-09 DIAGNOSIS — R11.0 NAUSEA: ICD-10-CM

## 2019-07-09 DIAGNOSIS — D50.9 IRON DEFICIENCY ANEMIA, UNSPECIFIED IRON DEFICIENCY ANEMIA TYPE: ICD-10-CM

## 2019-07-09 DIAGNOSIS — N18.30 CKD (CHRONIC KIDNEY DISEASE), STAGE III (HCC): ICD-10-CM

## 2019-07-09 DIAGNOSIS — G89.3 NEOPLASM RELATED PAIN: ICD-10-CM

## 2019-07-09 LAB
ACANTHOCYTES BLD QL SMEAR: ABNORMAL
ALBUMIN SERPL-MCNC: 3.26 G/DL (ref 3.5–5.2)
ALBUMIN/GLOB SERPL: 1.1 G/DL
ALP SERPL-CCNC: 182 U/L (ref 39–117)
ALT SERPL W P-5'-P-CCNC: 29 U/L (ref 1–41)
ANION GAP SERPL CALCULATED.3IONS-SCNC: 10.2 MMOL/L (ref 5–15)
AST SERPL-CCNC: 22 U/L (ref 1–40)
BASOPHILS # BLD MANUAL: 0.07 10*3/MM3 (ref 0–0.2)
BASOPHILS NFR BLD AUTO: 1 % (ref 0–1.5)
BILIRUB SERPL-MCNC: 0.2 MG/DL (ref 0.2–1.2)
BILIRUB UR QL STRIP: NEGATIVE
BUN BLD-MCNC: 19 MG/DL (ref 8–23)
BUN/CREAT SERPL: 16.8 (ref 7–25)
CALCIUM SPEC-SCNC: 9.2 MG/DL (ref 8.6–10.5)
CHLORIDE SERPL-SCNC: 103 MMOL/L (ref 98–107)
CLARITY UR: CLEAR
CO2 SERPL-SCNC: 24.8 MMOL/L (ref 22–29)
COLOR UR: ABNORMAL
CREAT BLD-MCNC: 1.13 MG/DL (ref 0.76–1.27)
DACRYOCYTES BLD QL SMEAR: ABNORMAL
DEPRECATED RDW RBC AUTO: 51.9 FL (ref 37–54)
EOSINOPHIL # BLD MANUAL: 0.07 10*3/MM3 (ref 0–0.4)
EOSINOPHIL NFR BLD MANUAL: 1 % (ref 0.3–6.2)
ERYTHROCYTE [DISTWIDTH] IN BLOOD BY AUTOMATED COUNT: 16.2 % (ref 12.3–15.4)
FERRITIN SERPL-MCNC: 392.4 NG/ML (ref 30–400)
GFR SERPL CREATININE-BSD FRML MDRD: 64 ML/MIN/1.73
GLOBULIN UR ELPH-MCNC: 2.9 GM/DL
GLUCOSE BLD-MCNC: 160 MG/DL (ref 65–99)
GLUCOSE UR STRIP-MCNC: NEGATIVE MG/DL
HCT VFR BLD AUTO: 28.5 % (ref 37.5–51)
HGB BLD-MCNC: 9 G/DL (ref 13–17.7)
HGB UR QL STRIP.AUTO: NEGATIVE
HYPOCHROMIA BLD QL: ABNORMAL
IRON 24H UR-MRATE: 23 MCG/DL (ref 59–158)
IRON SATN MFR SERPL: 9 % (ref 20–50)
KETONES UR QL STRIP: NEGATIVE
LEUKOCYTE ESTERASE UR QL STRIP.AUTO: NEGATIVE
LYMPHOCYTES # BLD MANUAL: 1.54 10*3/MM3 (ref 0.7–3.1)
LYMPHOCYTES NFR BLD MANUAL: 2 % (ref 5–12)
LYMPHOCYTES NFR BLD MANUAL: 21 % (ref 19.6–45.3)
MCH RBC QN AUTO: 29.3 PG (ref 26.6–33)
MCHC RBC AUTO-ENTMCNC: 31.6 G/DL (ref 31.5–35.7)
MCV RBC AUTO: 92.8 FL (ref 79–97)
METAMYELOCYTES NFR BLD MANUAL: 5 % (ref 0–0)
MONOCYTES # BLD AUTO: 0.15 10*3/MM3 (ref 0.1–0.9)
MYELOCYTES NFR BLD MANUAL: 1 % (ref 0–0)
NEUTROPHILS # BLD AUTO: 5.05 10*3/MM3 (ref 1.7–7)
NEUTROPHILS NFR BLD MANUAL: 66 % (ref 42.7–76)
NEUTS BAND NFR BLD MANUAL: 3 % (ref 0–5)
NITRITE UR QL STRIP: NEGATIVE
OVALOCYTES BLD QL SMEAR: ABNORMAL
PH UR STRIP.AUTO: <=5 [PH] (ref 5–8)
PLATELET # BLD AUTO: 202 10*3/MM3 (ref 140–450)
PMV BLD AUTO: 9 FL (ref 6–12)
POIKILOCYTOSIS BLD QL SMEAR: ABNORMAL
POTASSIUM BLD-SCNC: 4.3 MMOL/L (ref 3.5–5.2)
PROT SERPL-MCNC: 6.2 G/DL (ref 6–8.5)
PROT UR QL STRIP: NEGATIVE
RBC # BLD AUTO: 3.07 10*6/MM3 (ref 4.14–5.8)
SCAN SLIDE: NORMAL
SCHISTOCYTES BLD QL SMEAR: ABNORMAL
SMALL PLATELETS BLD QL SMEAR: ADEQUATE
SODIUM BLD-SCNC: 138 MMOL/L (ref 136–145)
SP GR UR STRIP: 1.02 (ref 1–1.03)
TIBC SERPL-MCNC: 246 MCG/DL (ref 298–536)
TRANSFERRIN SERPL-MCNC: 165 MG/DL (ref 200–360)
UROBILINOGEN UR QL STRIP: ABNORMAL
WBC NRBC COR # BLD: 7.32 10*3/MM3 (ref 3.4–10.8)

## 2019-07-09 PROCEDURE — 99214 OFFICE O/P EST MOD 30 MIN: CPT | Performed by: INTERNAL MEDICINE

## 2019-07-09 PROCEDURE — 96377 APPLICATON ON-BODY INJECTOR: CPT

## 2019-07-09 PROCEDURE — 84466 ASSAY OF TRANSFERRIN: CPT | Performed by: INTERNAL MEDICINE

## 2019-07-09 PROCEDURE — 96375 TX/PRO/DX INJ NEW DRUG ADDON: CPT

## 2019-07-09 PROCEDURE — 80053 COMPREHEN METABOLIC PANEL: CPT | Performed by: INTERNAL MEDICINE

## 2019-07-09 PROCEDURE — 25010000002 PACLITAXEL PROTEIN-BOUND PART PER 1 MG: Performed by: INTERNAL MEDICINE

## 2019-07-09 PROCEDURE — 85007 BL SMEAR W/DIFF WBC COUNT: CPT | Performed by: INTERNAL MEDICINE

## 2019-07-09 PROCEDURE — 25010000002 DEXAMETHASONE SODIUM PHOSPHATE 20 MG/5ML SOLUTION 5 ML VIAL: Performed by: INTERNAL MEDICINE

## 2019-07-09 PROCEDURE — 96417 CHEMO IV INFUS EACH ADDL SEQ: CPT

## 2019-07-09 PROCEDURE — 25010000002 PEGFILGRASTIM 6 MG/0.6ML PREFILLED SYRINGE KIT: Performed by: INTERNAL MEDICINE

## 2019-07-09 PROCEDURE — 83540 ASSAY OF IRON: CPT | Performed by: INTERNAL MEDICINE

## 2019-07-09 PROCEDURE — 25010000002 GEMCITABINE HCL 2 GM/20ML SOLUTION 20 ML VIAL: Performed by: INTERNAL MEDICINE

## 2019-07-09 PROCEDURE — 82728 ASSAY OF FERRITIN: CPT | Performed by: INTERNAL MEDICINE

## 2019-07-09 PROCEDURE — 81003 URINALYSIS AUTO W/O SCOPE: CPT | Performed by: INTERNAL MEDICINE

## 2019-07-09 PROCEDURE — 96413 CHEMO IV INFUSION 1 HR: CPT

## 2019-07-09 PROCEDURE — 85025 COMPLETE CBC W/AUTO DIFF WBC: CPT | Performed by: INTERNAL MEDICINE

## 2019-07-09 PROCEDURE — 25010000002 FOSAPREPITANT PER 1 MG: Performed by: INTERNAL MEDICINE

## 2019-07-09 PROCEDURE — 96367 TX/PROPH/DG ADDL SEQ IV INF: CPT

## 2019-07-09 RX ORDER — SODIUM CHLORIDE 0.9 % (FLUSH) 0.9 %
10 SYRINGE (ML) INJECTION AS NEEDED
Status: DISCONTINUED | OUTPATIENT
Start: 2019-07-09 | End: 2019-07-09 | Stop reason: HOSPADM

## 2019-07-09 RX ORDER — SODIUM CHLORIDE 9 MG/ML
INJECTION, SOLUTION INTRAVENOUS
Status: DISCONTINUED
Start: 2019-07-09 | End: 2019-07-09 | Stop reason: HOSPADM

## 2019-07-09 RX ORDER — SODIUM CHLORIDE 9 MG/ML
500 INJECTION, SOLUTION INTRAVENOUS ONCE
Status: COMPLETED | OUTPATIENT
Start: 2019-07-09 | End: 2019-07-09

## 2019-07-09 RX ORDER — SODIUM CHLORIDE 0.9 % (FLUSH) 0.9 %
10 SYRINGE (ML) INJECTION AS NEEDED
Status: CANCELLED | OUTPATIENT
Start: 2019-07-09

## 2019-07-09 RX ORDER — PACLITAXEL 100 MG/20ML
170 INJECTION, POWDER, LYOPHILIZED, FOR SUSPENSION INTRAVENOUS ONCE
Status: COMPLETED | OUTPATIENT
Start: 2019-07-09 | End: 2019-07-09

## 2019-07-09 RX ADMIN — PACLITAXEL 170 MG: 100 INJECTION, POWDER, LYOPHILIZED, FOR SUSPENSION INTRAVENOUS at 13:02

## 2019-07-09 RX ADMIN — DEXAMETHASONE SODIUM PHOSPHATE 12 MG: 4 INJECTION, SOLUTION INTRAMUSCULAR; INTRAVENOUS at 12:00

## 2019-07-09 RX ADMIN — GEMCITABINE 1400 MG: 100 INJECTION, SOLUTION INTRAVENOUS at 13:42

## 2019-07-09 RX ADMIN — SODIUM CHLORIDE 500 ML/HR: 9 INJECTION, SOLUTION INTRAVENOUS at 12:00

## 2019-07-09 RX ADMIN — SODIUM CHLORIDE, PRESERVATIVE FREE 10 ML: 5 INJECTION INTRAVENOUS at 14:42

## 2019-07-09 RX ADMIN — SODIUM CHLORIDE, PRESERVATIVE FREE 500 UNITS: 5 INJECTION INTRAVENOUS at 14:42

## 2019-07-09 RX ADMIN — PEGFILGRASTIM 6 MG: KIT SUBCUTANEOUS at 14:42

## 2019-07-09 RX ADMIN — SODIUM CHLORIDE 150 MG: 9 INJECTION, SOLUTION INTRAVENOUS at 12:16

## 2019-07-09 NOTE — PROGRESS NOTES
NAME: Todd Phillips    : 1950    DATE : 19    DIAGNOSIS:   1.   Stage III moderately differentiated adenocarcinoma of the head of the pancreas with involvement of the duodenum ([id number]).    2.  Repeated episodes of bacteremia due to cholangitis -    3.  Refractory nausea/vomiting    CHIEF COMPLAINT:  Follow up of pancreatic cancer    TREATMENT:  1.       2.  On 18, Dr. Brody performed  Pancreaticoduodenectomy, wedge biopsy of the liver, portal vein resection and lateral repair and gastrojejunostomy tube placement    3.         HISTORY OF PRESENT ILLNESS:   Todd Phillips is a very pleasant 69 y.o. male who is being seen today at the request of Dr. Miquel Brody for evaluation and treatment of pancreatic cancer.  Mr. Phillips initially developed symptoms in 2018. At that time he had pain in his upper abdomen which would radiate to his back.  In July, the pain intensified.  He was seen in the ER and also by Dr. Su.  He underwent RUQ U/S and then HIDA scan and it was determined he had a dysfunctional gallbladder.  He saw Dr. Downs and had cholecystectomy on 8-10-18.  Pathology showed chronic cholecystitis and an incidental benign lymph node.  He re-presented in the post-operative period with jaundice.  Dr. Su performed ERCP on18.  He was noted to have a 3 cm irregular tight stricture of the distal common bile duct with proximal biliary ductal dilatation.  Biliary papillotomy and stricture dilatation performed and brushings taken.  A 10 Fr x 5 cm biliary stent was placed.  Brushings were taken but were negative.  There was sl dilatation of the distal pancreatic duct without discrete stricture.   He was discharged with improving jaundice on .  On  he represented with sepsis due to Klebsiella pneumonia bacteremia and was admitted.  He then was referred to Dr. Brody for evaluation and treatment.  He has been set up for EUS with biopsy next Friday for what appears to be  a primary pancreatic malignancy in the head of the pancreas.  Dr. Brody has referred him for consideration of neoadjuvant therapy.    INTERVAL HISTORY:  Mr. Phillips is here today with his wife for follow up of pancreatic cancer. He is feeling poorly today. He was doing a little better and was able to attend his son's wedding.  He reports that for the last week he has been feeling weak.  Over the last couple of weeks he has been spending most of his time in bed.  He continues to have a good appetite and is eating well but has had more nausea again.  He says the compazine helps his nausea most and he has had to take it about twice/day.  He sometimes gags but hasn't vomited.  He had an isolated subjective fever last week but this hasn't recurred. He complains of increased vague diffuse abdominal discomfort.  He has had to take pain medication about 3x/day.      PAST MEDICAL HISTORY:  Past Medical History:   Diagnosis Date   • Antral gastritis    • Asthma    • Atrial fibrillation (CMS/HCC)     treated with oral blood thinner   • Chest pain    • COPD (chronic obstructive pulmonary disease) (CMS/HCC)    • Coronary artery disease     no stents   • Diabetes mellitus (CMS/HCC)     type 2 - checks sugar 4 times per day    • Duodenitis    • Elevated cholesterol    • Emphysema of lung (CMS/HCC)    • Gallbladder disease     removed    • GERD (gastroesophageal reflux disease)    • Hard to intubate     busted lip last time   • Hyperlipidemia    • Hypertension    • Jaundice    • Kidney stone    • Kidney stones     had lithotripsy and passed 36 kidney stones as well as had one surgically removed.   • Malignant neoplasm of head of pancreas (CMS/HCC) 9/5/2018   • Nausea    • Obstructive sleep apnea on CPAP     compliant with machine    • Palpitations    • Pneumonia 08/2018   • Reflux esophagitis    • Renal failure     stage 3 kidney disease   • Sepsis (CMS/HCC)        PAST SURGICAL HISTORY:  Past Surgical History:   Procedure  Laterality Date   • BILE DUCT STENT PLACEMENT      [facility] 2 weeks ago    • CARDIAC CATHETERIZATION  11/01/1999   • CARDIOVASCULAR STRESS TEST  09/2012   • CHOLECYSTECTOMY N/A 8/10/2018    Procedure: CHOLECYSTECTOMY LAPAROSCOPIC;  Surgeon: Ronak Downs MD;  Location:  [facility];  Service: General   • COLONOSCOPY     • CYSTOSCOPY BLADDER STONE LITHOTRIPSY     • ECHO - CONVERTED  09/2012   • ERCP N/A 8/14/2018    Procedure: ENDOSCOPIC RETROGRADE CHOLANGIOPANCREATOGRAPHY WITH PAPILLOTOMY;  Surgeon: Scot Su MD;  Location:  [facility];  Service: Gastroenterology   • ERCP N/A 10/1/2018    Procedure: ENDOSCOPIC RETROGRADE CHOLANGIOPANCREATOGRAPHY;  Surgeon: Jefry Luna MD;  Location:  [facility];  Service: Gastroenterology   • KIDNEY STONE SURGERY     • KIDNEY STONE SURGERY      open abdominal surgery   • PORTACATH PLACEMENT Right 10/23/2018    Procedure: INSERTION OF PORTACATH;  Surgeon: Ronak Downs MD;  Location:  [facility];  Service: General   • TONSILLECTOMY     • UPPER GASTROINTESTINAL ENDOSCOPY  08/30/2012   • WHIPPLE PROCEDURE N/A 12/31/2018    Procedure: WHIPPLE PROCEDURE, BIOPSY OF LIVER, PORTAL VEIN RESECTION AND REPAIR, G/J TUBE PLACEMENT;  Surgeon: Miquel Brody MD;  Location:  [facility];  Service: General       FAMILY HISTORY:  Family History   Problem Relation Age of Onset   • Heart attack Mother    • Heart disease Mother    • Stroke Mother    • Kidney disease Mother    • Heart failure Mother    • Lung disease Father    • Tuberculosis Father    • Diabetes Sister    • Heart attack Brother    • Heart failure Brother    • Heart disease Brother    • Diabetes Sister    • No Known Problems Brother    • No Known Problems Brother        SOCIAL HISTORY:  Social History     Socioeconomic History   • Marital status:      Spouse name: Not on file   • Number of children: Not on file   • Years of education: Not on file   • Highest education  level: Not on file   Tobacco Use   • Smoking status: Never Smoker   • Smokeless tobacco: Never Used   Substance and Sexual Activity   • Alcohol use: No   • Drug use: No   • Sexual activity: Defer     Partners: Female   Social History Narrative    He is  and lives alone with his wife although they have 3 children together who all live close by. He is retired from the [employer] and was exposed to Agent Orange during [place]. He is a lifelong nonsmoker.         A DIL  of cancer.    REVIEW OF SYSTEMS:   A comprehensive 14 point review of systems was performed.  Significant findings as mentioned above.  All other systems reviewed and are negative.      MEDICATIONS:  The current medication list was reviewed in the EMR    Current Outpatient Medications:   •  albuterol (PROVENTIL HFA;VENTOLIN HFA) 108 (90 BASE) MCG/ACT inhaler, Inhale 2 puffs Every 4 (Four) Hours As Needed for Wheezing or Shortness of Air., Disp: , Rfl:   •  albuterol (PROVENTIL) (2.5 MG/3ML) 0.083% nebulizer solution, Take 2.5 mg by nebulization Every 6 (Six) Hours As Needed for Wheezing or Shortness of Air., Disp: , Rfl:   •  allopurinol (ZYLOPRIM) 100 MG tablet, Take 1 tablet by mouth Daily., Disp: 90 tablet, Rfl: 0  •  apixaban (ELIQUIS) 5 MG tablet tablet, Take 1 tablet by mouth Every 12 (Twelve) Hours., Disp: 60 tablet, Rfl:   •  cholecalciferol (VITAMIN D3) 1000 units tablet, Take 1,000 Units by mouth Daily., Disp: , Rfl:   •  dexamethasone (DECADRON) 4 MG tablet, Take 2 tablets by mouth in the morning on days 2, 3, and 4 after chemo., Disp: 12 tablet, Rfl: 2  •  flecainide (TAMBOCOR) 50 MG tablet, Take 50 mg by mouth Every 12 (Twelve) Hours., Disp: , Rfl:   •  FLUoxetine (PROzac) 20 MG capsule, Take 20 mg by mouth 2 (Two) Times a Day., Disp: , Rfl:   •  insulin lispro (humaLOG) 100 UNIT/ML injection, Inject  under the skin into the appropriate area as directed 3 (Three) Times a Day As Needed (before meals prn (2 units if > 150-199, 4  units if > 199))., Disp: , Rfl:   •  lactobacillus acidophilus (RISAQUAD) capsule capsule, Take 1 capsule by mouth Daily., Disp: 30 capsule, Rfl: 0  •  mometasone (ASMANEX) 220 MCG/INH inhaler, Inhale 2 puffs Every Night., Disp: , Rfl:   •  montelukast (SINGULAIR) 10 MG tablet, Take 10 mg by mouth Every Night., Disp: , Rfl:   •  nitroglycerin (NITROSTAT) 0.4 MG SL tablet, Place 0.4 mg under the tongue as needed for chest pain., Disp: , Rfl:   •  omeprazole (priLOSEC) 40 MG capsule, Take 40 mg by mouth Daily., Disp: , Rfl:   •  ondansetron (ZOFRAN) 8 MG tablet, Take 1 tablet by mouth Every 8 (Eight) Hours As Needed for Nausea or Vomiting., Disp: 60 tablet, Rfl: 3  •  oxyCODONE (ROXICODONE) 10 MG tablet, Take 1-2 tabs every 4-6 hours as needed for pain, Disp: 100 tablet, Rfl: 0  •  pancrelipase, Lip-Prot-Amyl, (CREON) 74360 units capsule delayed-release particles capsule, Take 12,000 units of lipase by mouth 3 (Three) Times a Day With Meals., Disp: , Rfl:   •  polyethylene glycol (MIRALAX) packet, Take 17 g by mouth Daily As Needed., Disp: , Rfl:   •  prochlorperazine (COMPAZINE) 10 MG tablet, Take 1 tablet by mouth Every 6 (Six) Hours As Needed for Nausea or Vomiting., Disp: 60 tablet, Rfl: 3  •  promethazine (PHENERGAN) 12.5 MG tablet, Take 1 tablet by mouth Every 6 (Six) Hours As Needed for Nausea or Vomiting., Disp: 90 tablet, Rfl: 3  •  promethazine (PHENERGAN) 25 MG suppository, Insert 1 suppository into the rectum Every 6 (Six) Hours As Needed for Nausea or Vomiting., Disp: 10 each, Rfl: 0  •  rifaximin (XIFAXAN) 550 MG tablet, Take 550 mg by mouth 2 (Two) Times a Day., Disp: , Rfl:   •  Tiotropium Bromide-Olodaterol 2.5-2.5 MCG/ACT aerosol solution, Inhale 2 puffs Daily., Disp: , Rfl:   No current facility-administered medications for this visit.     Facility-Administered Medications Ordered in Other Visits:   •  sodium chloride 0.9 % infusion  - ADS Override Pull, , , ,   •  sodium chloride 0.9 % infusion  -  ADS Override Pull, , , ,     ALLERGIES:    Allergies   Allergen Reactions   • Chlorhexidine Hives and Rash   • Contrast Dye Other (See Comments)     Can't have due to kidney failure per family   • Fentanyl Confusion and Unknown (See Comments)     Patient family reports extreme symptoms with fentanyl. Including confusion, restlessness, irritability an heavy sedation.     PHYSICAL EXAM:  Vitals:    07/09/19 1025   BP: (!) 80/54   Pulse: 99   Resp: 20   Temp: 98.6 °F (37 °C)   SpO2: 93%   General:  Alert and oriented. Appears fatigued today, chronically-ill but non-toxic appearing.   HEENT:  Pupils are equal, round and reactive to light and accommodation, Extra-ocular movements full, Oropharyx clear, MM sl dry  Neck:  No JVD, thyromegaly or lymphadenopathy  CV:  Regular rate/rhythm, no murmurs, rubs or gallops  Resp:  Lungs are clear to auscultation bilaterally  Abd:  Soft, sl diffuse tenderness with palpation, non-distended, bowel sounds intact, no organomegaly or masses.  Surgical scars present.  Int: Bruising on left forearm along with skin tear with steristrip in place, CDI. Bruising/abrasion on left cheek.    Ext:  No clubbing, cyanosis or edema.    Lymph:  No cervical, supraclavicular, axillary,adenopathy  Neuro: grossly non-focal exam    PATHOLOGY:  08-15-18         12-31-18 Whipple  Final Diagnosis   1. LIVER, WEDGE BIOPSY:  Small bland ductular proliferation compatible with bile duct adenoma.   Negative for metastatic carcinoma.   2. OMENTUM:  Mild chronic inflammation and surface adhesions; negative for neoplasm.   3. HEPATIC ARTERY LYMPH NODE:  Sinus histiocytosis; single lymph node negative for metastatic carcinoma. (0/1)  4. SUBMITTED BILE DUCT MARGIN:  Margin with chronic inflammation; negative for adenocarcinoma.   5. AORTA-CAVAL LYMPH NODES:  Two lymph nodes negative for metastatic carcinoma. (0/2)  6. PANCREATODUODENECTOMY (WHIPPLE PROCEDURE);  Invasive moderately differentiated adenocarcinoma arising  in head of pancreas and involving duodenum.  Gross tumor size 3.7 cm in greatest dimension.  Perineural and peripancreatic extension identified.   Soft tissue at superior mesenteric artery positive for neoplasm.  Posterior/uncinate margin involved by neoplasm.  Five of twelve peripancreatic lymph nodes positive for metastatic neoplasm. See Template.        PANCREAS EXOCRINE:  TYPE OF SPECIMEN/PROCEDURE: Whipple procedure  SPECIMEN INTEGRITY: Intact  TUMOR SITE (HEAD, BODY, TAIL): Head  TUMOR SIZE (GREATEST DIMENSION): 3.7 cm greatest dimension  HISTOLOGIC TYPE: Adenocarcinoma  HISTOLOGIC rdGrdRrdArdDrdErd:rd rd3rd TUMOR EXTENSION: Neoplasm involves duodenum and peripancreatic fat  SURGICAL MARGINS, IF INVOLVED, (SPECIFY WHERE): Involved  MARGINS EXAMINED: See below  PARENCHYMAL MARGIN: Not involved  UNCINATE: Involved  BILE DUCT: Not involved  DISTAL MARGIN: Not involved   PROXIMAL MARGIN: Not involved   OTHER MARGINS: SMA margin involved   CLOSEST MARGIN: Margins involved    SPECIFIC MARGIN: SMA and posterior/uncinate  INVADES CELIAC AXIS OR SMA: No  VASCULAR/LYMPHATIC INVASION: Present  PERINEURAL INVASION:  Present  REGIONAL LYMPH NODES: Submitted  NUMBER OF LYMPH NODES INVOLVED: 5  NUMBER OF LYMPH NODES EXAMINED: 15  EXTRACAPSULAR EXTENSION: Focally present  TREATMENT EFFECT: No known presurgical therapy  ADDITIONAL PATHOLOGIC FINDINGS: Chronic pancreatitis   OTHER STUDIES: Available upon request  AJCC PATHOLOGIC STAGE: (COMPLETED BY PATHOLOGIST BASED ONLY ON TISSUE FINDINGS, MORE EXTENSIVE DISEASE MAY NOT BE KNOW TO THE PATHOLOGIST)  pT=  2   pN= 2  AJCC PATHOLOGIC STAGE: III     1-11-19 Abdominal Fluid:  Final Diagnosis    ABDOMINAL FLUID:  Negative for malignancy.  Acute inflammation and necrotic debris.  No tumor seen.         ENDOSCOPY:  08-14-18 ERCP with papillotomy, balloon rotation of the biliary stricture, pathology brushings, ileum stent placement ([place])  1.  Irregular 3 cm distal common bile duct stricture  concerning for neoplastic disease. Biliary papillotomy, stricture dilatation by  through the scope balloon, cytology brushings performed and 10 [place] by 5 cm biliary stent placed.    2.  Dilated common hepatic duct to 15 mm when fully contrast filled.  Dilated intrahepatic biliary tree.    3.  No stones proximal to the stricture were identified.    4.  Slight dilatation of the distal pancreatic duct without a discrete stricture identified.    IMAGIN18 CT Abdomen Pelvis Stone Protocol   Findings  - LOWER THORAX: Clear. No effusions.  - LIVER: Homogeneous. No focal hepatic mass or ductal dilatation.  - GALLBLADDER: MINIMAL STRANDING AROUND REGION OF GALLBLADDER FOSSA  THAT MAY BE POSTSURGICAL.  - PANCREAS: Unremarkable. No mass or ductal dilatation.  - SPLEEN: Homogeneous. No splenomegaly.  - ADRENALS: No mass.  - KIDNEYS: No mass. No obstructive uropathy.  No evidence of urolithiasis.  - GI TRACT: SMALL HIATAL HERNIA.  - PERITONEUM: No free air. No free fluid or loculated fluid collections.  - MESENTERY: Unremarkable.  - LYMPH NODES: No lymphadenopathy.  - VASCULATURE: ATHEROSCLEROTIC VASCULAR CALCIFICATION.  - ABDOMINAL WALL: No focal hernia or mass.  - OTHER: None.  - BLADDER: No focal mass or significant wall thickening  - REPRODUCTIVE: Unremarkable as visualized.  - APPENDIX: Nondistended. No surrounding inflammation.  - BONES: No acute bony abnormality.     IMPRESSION:  - Minimal stranding around region of gallbladder fossa that may be postsurgical.     18 US Liver   FINDINGS:   - Visualized pancreas is unremarkable.   - The gallbladder is absent. No collection identified.   - The CBD measures 10.69088930993 mm    .  - The liver demonstrates normal echogenicity without focal lesion.    - No ascites demonstrated.        IMPRESSION:  - As above.    18 CT Abdomen Pelvis Stone Protocol   FINDINGS:   - Minimal bibasilar atelectasis.  - Hiatal hernia.  - The liver is homogeneous. There is no  evidence of focal hepatic mass.  - The spleen is homogeneous.  - Stent is noted traversing the common bile duct.  - 3 mm nodule in the right lung on image 8 of the axial series.  - There is no adrenal enlargement.  - The kidneys show no evidence of hydronephrosis or hydroureter. I do not see any distal ureteral stones.   - Otherwise I do not see any free fluid or walled off fluid collections.  - There is moderate to large volume stool in the colon.  - There is no evidence of mesenteric or retroperitoneal adenopathy.     IMPRESSION:  1. Tiny nodule in the right lung base.  2. Minimal bibasilar atelectasis.  3. Moderate to large volume stool in the colon.     Other findings as above.    08-22-18 CT Abdomen Pelvis With Contrast   FINDINGS:   - Tiny left basilar nodule measuring 4 mm on image 14 of the axial series.  - Minimal bibasilar atelectasis.  - The liver is homogeneous. There is no evidence of focal hepatic mass  - The spleen is homogeneous  - Indistinct appearance of the pancreatic head. A biliary stent is present.  - Small left adrenal nodule is present.  - The kidneys show no evidence of hydronephrosis or hydroureter. I do not see any distal ureteral stones.   - Otherwise I do not see any free fluid or walled off fluid collections.  - Large volume stool is present in the colon.     IMPRESSION:  1. Slightly enlarged, indistinct appearance of the pancreatic head.  - Underlying neoplasm certainly not excluded but no delineable mass is present. There is a biliary stent.    2. Tiny nodule in the left lung base.    3. Small left adrenal nodule.    4. Hiatal hernia.    5. Moderate to large volume stool in the colon.      09-11-18 CT Chest Without Contrast   FINDINGS:   - Minimal coronary artery calcifications present.  - No pericardial or pleural effusions.  - No parenchymal soft tissue nodules or masses. There are findings of COPD.     IMPRESSION:  - COPD.  - Hiatal hernia.  - Minimal coronary artery calcification.        09-11-18 CT Abdomen Pelvis Without Contrast   FINDINGS:   - Lung bases show mild increased interstitial markings.  - There is a hiatal hernia.  - The liver is homogeneous. There is no evidence of focal hepatic mass.  - The spleen is homogeneous.  - Mildly indistinct appearance of the pancreas. There is a biliary stent present. I cannot exclude mild degree of pancreatitis..  - There is no adrenal enlargement.  - The kidneys show no evidence of hydronephrosis or hydroureter. I do not see any distal ureteral stones.   - Otherwise I do not see any free fluid or walled off fluid collections.  - Large volume stool is present in the colon.  - Urinary bladder wall is slightly thickened.  - There is no evidence of mesenteric or retroperitoneal adenopathy.    IMPRESSION:  1. Mildly indistinct appearance of the proximal pancreas.  2. Urinary bladder wall thickening.  3. Large volume stool in the colon.    09-14-18 CT Abdomen Without Contrast   FINDINGS:   - Again noted is very mild indistinct appearance of the pancreatic head. Stent is stable in position.  - The liver is stable and homogeneous.  - Spleen is homogeneous.  - No adrenal enlargement.  - Moderate to large volume stool in the colon.     IMPRESSION:  - No significant interval change since the previous exam.     09-21-18 NM Pet Skull Base To Mid Thigh   FINDINGS:      HEAD/NECK:  - No FDG hypermetabolic neck adenopathy.  - No FDG hypermetabolic masses.     CHEST:   - No FDG hypermetabolic thoracic adenopathy.  - No FDG hypermetabolic lung nodules or masses.  - Evaluation for tiny parenchymal nodules is somewhat limited on low dose CT secondary to respiratory motion.     ABDOMEN/PELVIS:   - There is hypermetabolic activity in the pancreatic head with a maximum SUV of 4.971.  - Physiologic FDG hypermetabolism seen throughout the GI tract.  - Physiologic FDG hypermetabolism seen throughout the mesentery, retroperitoneum and pelvis.     BONES:   - There are  scattered foci of FDG hypermetabolism most suggestive of degenerative change seen throughout the axial skeleton. If concern persist for metastatic lesions of the bone, HDP Bone scan is recommended for further evaluation.     IMPRESSION:  - Abnormal hypermetabolic activity corresponding to area of indistinctness in the pancreatic head. Maximum SUV is 4.97.    1-20-19 CT CAP with contrast:  Today's study shows mild coronary artery calcifications.     There are bilateral pleural effusions, right greater than left.     There is bibasilar consolidation. I cannot exclude pneumonia.     No parenchymal masses are seen.     IMPRESSION:  Bibasilar effusions and bibasilar consolidation.     There are bibasilar pleural effusions and there is bibasilar  consolidation.     Patient has a percutaneous gastric tube.     There is pneumoperitoneum. This may be from the percutaneous gastric  tube. The tube appears to remain intraluminal but left lateral aspect  could extend beyond the confines of the bowel.     There is a perihepatic fluid collection which contains air and is  concerning for perihepatic abscess.     The liver is homogeneous. There is no evidence of focal hepatic mass     The spleen is homogeneous     There is no peripancreatic stranding or pancreatic head mass.     There is no adrenal enlargement.     The kidneys show no evidence of hydronephrosis or hydroureter. I do not  see any distal ureteral stones.      Small volume ascites is present.     There is no bowel wall thickening or mesenteric stranding.             IMPRESSION:  :   1. Bibasilar effusions and bibasilar consolidation.  2. Loculated perihepatic fluid collection containing air. This is  concerning for an abscess.  3. Percutaneous gastric tube is present. The tip appears to remain  intraluminal. There is, however, single focus of pneumoperitoneum which  may be associated with gastric tube placement.  4. Small volume ascites.    CT A/P without Contrast  1-24-19:  The percutaneous drain placed on 01/11/2019 and the  perihepatic abscess has been removed. There is persistent loculated  perihepatic fluid although this has markedly improved since prior to  drain placement; the collection did measure 8 x 22 x 9 cm before  drainage as one large collection and now is split into two possibly  separate smaller collections, the more posterior of the two measuring 2  x 10 x 2 cm and the more superior 4 x 6 x 3 cm (the collections may be  narrowly connected).     Percutaneous gastrojejunostomy is in place; status post Whipple. There  is a small amount of low density abdominopelvic free fluid. There is gas  in the nondependent portion of the urinary bladder, probably from recent  instrumentation. There is no evidence of bowel obstruction. Only a tiny  locule of free air persists in the mid abdomen deep to the midline  incision, improved. The kidneys, adrenal glands, pancreas and spleen are  unchanged.     There is mild body wall edema and subcutaneous gas from recent  medication injections. Small volume right and trace volume left pleural  effusions. There is near complete collapse of the right lower lobe but  there is only subsegmental atelectasis at the left lung base. No  pertinent osseous abnormality is seen.     IMPRESSION:  1. Marked improvement in the perihepatic subcapsular abscess. Drainage  catheter has been removed.  2. Unchanged low density free fluid. Unchanged small volume right and  trace left pleural effusions.    CTCAP 02-26-19:  Findings  LUNGS: BIBASILAR LUNG FIBROTIC CHANGE.  HEART: Unremarkable.  PERICARDIUM: No effusion.  MEDIASTINUM: No masses. No enlarged lymph nodes.  No fluid collections.  PLEURA: TINY LEFT PLEURAL EFFUSION.  MAJOR AIRWAYS: Clear. No intrinsic mass.  VASCULATURE: No evidence of aneurysm.     VISUALIZED UPPER ABDOMEN:  LIVER: Homogeneous. No focal hepatic mass or ductal dilatation.  SPLEEN: Homogeneous. No splenomegaly.  ADRENALS: No  mass.  KIDNEYS: No mass. No obstructive uropathy.  No evidence of urolithiasis.  GI TRACT: Non-dilated. No definite wall thickening  PERITONEUM: No free air. No free fluid or loculated fluid collections.  ABDOMINAL WALL: No focal hernia or mass.       OTHER: None.     BONES: No acute bony abnormality.     IMPRESSION:  Now only tiny pleural effusions.    Findings  LOWER THORAX: Clear. No effusions.     ABDOMEN:  LIVER: SUBCAPSULE LIVER COLLECTION MEASURING ABOUT 4.8 CM THAT WAS ABOUT 7.2 CM.  GALLBLADDER: No dilation or stone identified.  PANCREAS: Unremarkable. No mass or ductal dilatation.  SPLEEN: Homogeneous. No splenomegaly.  ADRENALS: No mass.  KIDNEYS: No mass. No obstructive uropathy.  No evidence of urolithiasis.  GI TRACT: Non-dilated. No definite wall thickening.  PERITONEUM: TINY ASCITES.  MESENTERY: Unremarkable.  LYMPH NODES: No lymphadenopathy.  VASCULATURE: No evidence of aneurysm.  ABDOMINAL WALL: No focal hernia or mass.     OTHER: None.     PELVIS:  BLADDER: No focal mass or significant wall thickening  REPRODUCTIVE: Unremarkable as visualized.  APPENDIX: Nondistended. No surrounding inflammation.  BONES: No acute bony abnormality.     IMPRESSION:  Still tiny ascites. Decreased size of a subhepatic collection.      PET/CT 03-29-19:  FINDINGS:      HEAD/NECK:  No FDG hypermetabolic neck adenopathy.  No FDG hypermetabolic masses.     CHEST:   No FDG hypermetabolic thoracic adenopathy.  No FDG hypermetabolic lung nodules or masses.  Evaluation for tiny parenchymal nodules is somewhat limited on low dose  CT secondary to respiratory motion.     ABDOMEN/PELVIS:   Physiologic FDG hypermetabolism seen throughout the solid abdominal  organs.  There is hypermetabolic activity along the anterior margin of the liver.  This appears to correspond to trace subcapsular fluid. The maximum SUV  was 5.385. This is abnormal.  There is a tiny nodular density anterior to the right of midline in the  peritoneum on image  234 of the axial series. This shows maximum SUV of  3.057. Also hypermetabolic activity to the left of midline adjacent to  surgical clips in the anterior abdomen. This shows a maximum SUV of  4.206.     Physiologic FDG hypermetabolism seen throughout the mesentery,  retroperitoneum and pelvis.     BONES: There are scattered foci of FDG hypermetabolism most suggestive  of degenerative change seen throughout the axial skeleton. If concern  persist for metastatic lesions of the bone, HDP Bone scan is recommended  for further evaluation.     IMPRESSION:     1. Hypermetabolic activity along the anterior margin of the liver which  appears to correspond with a small amount of subcapsular fluid but  concerning for active disease.  2. Tiny nodular density or possibly trace fluid in the peritoneum to the  right of midline as described above.  3. Hypermetabolic activity possibly bowel activity in the lower abdomen  to the left of midline adjacent to the surgical clip on image 247 of the  axial series. All of these areas could represent residual or metastatic  Disease.      CT L spine 06-03-19:  FINDINGS: Lumbar vertebral bodies are stable in height and alignment.  There continues to be degenerative disc change, most significant at  L4-L5 and L5-S1. There is gas formation in the nucleus of the disc at  L4-L5. The upper lumbar disc were better maintained in height. The facet  joints are appropriately aligned.     IMPRESSION:  Persistent degenerative disc change in the lumbar spine,  most prominent at L4-L5, not significantly changed radiographically from  the earlier exam in 2017.     RECENT LABS:  Lab Results   Component Value Date    WBC 7.32 07/09/2019    HGB 9.0 (L) 07/09/2019    HCT 28.5 (L) 07/09/2019    MCV 92.8 07/09/2019    RDW 16.2 (H) 07/09/2019     07/09/2019    NEUTRORELPCT 77.3 (H) 06/17/2019    LYMPHORELPCT 14.4 (L) 06/17/2019    MONORELPCT 6.9 06/17/2019    EOSRELPCT 0.2 (L) 06/17/2019    BASORELPCT 0.2  06/17/2019    NEUTROABS 5.05 07/09/2019    LYMPHSABS 0.75 06/17/2019       Lab Results   Component Value Date     07/09/2019    K 4.3 07/09/2019    CO2 24.8 07/09/2019     07/09/2019    BUN 19 07/09/2019    CREATININE 1.13 07/09/2019    EGFRIFNONA 64 07/09/2019    EGFRIFAFRI  09/02/2016      Comment:      <15 Indicative of kidney failure.    GLUCOSE 160 (H) 07/09/2019    CALCIUM 9.2 07/09/2019    ALKPHOS 182 (H) 07/09/2019    AST 22 07/09/2019    ALT 29 07/09/2019    BILITOT 0.2 07/09/2019    ALBUMIN 3.26 (L) 07/09/2019    PROTEINTOT 6.2 07/09/2019    MG 2.0 04/24/2019    PHOS 3.3 04/24/2019     Lab Results   Component Value Date    FERRITIN 392.40 07/09/2019    IRON 23 (L) 07/09/2019    TIBC 246 (L) 07/09/2019    LABIRON 9 (L) 07/09/2019    WVQDHWZP62 1,361 (H) 02/20/2019    FOLATE 19.70 02/20/2019       Lab Results   Component Value Date     82.0 (H) 06/24/2019     76.1 (H) 04/23/2019     129.6 (H) 03/20/2019     85 (H) 02/20/2019     34 01/22/2019     104 (H) 01/07/2019     1349 (H) 12/19/2018     1089 (H) 12/10/2018     1576 (H) 11/13/2018     5149 (H) 10/19/2018     7602 (H) 09/25/2018     3636 (H) 09/07/2018     4032 (H) 08/15/2018     ASSESSMENT & PLAN:  Todd Phillips is a very pleasant 69 y.o. male with Stage III moderately differentiated adenocarcinoma of the head of the pancreas with involvement of the duodenum ([id number]), now with evidence of anastomotic recurrence as well as involvement of the liver and rising .    1.  Pancreatic cancer:  -  Initially attempted neoadjuvant chemotherapy with Gemcitabine and Abraxane and he completed one cycle.  He became very weak and had to have TPN support.   -  He underwent surgical resection 12-31-18 and had Stage III disease ([id number]) moderately differentiated adenocarcinoma of the pancreas.  Tumor was 3.7 cm in maximal dimension and 5/15 associated LNs were involved.  Uncinate and SMA  margins were involved.    -  There has been question about whether he should take further therapy (chemotherapy and / or radiation), but he had a very slow recovery and wasn't able to take this promptly after surgery.  -   then started to rise, he developed obstructive symptoms and he has PET positivity at the small bowel anastomosis along with abnormal emptying study.  He also has an area of PET uptake along the anterior liver.  -  All of this is concerning for recurrent disease.  -  Recommended repeat trial of chemotherapy with Gemcitabine / Abraxane.    -  He started Gemcitabine / Abraxane.  He initially seemed to tolerate it well but unfortunately, developed pancytopenia / febrile neutropenia. Once his acute symptoms resolved, he was restarted with reduced dose of 20% and D15 was eliminated. With treatment, he has had increased fatigue/weakness. Therefore, reduced dose further to 30% with C4 and he reports tolerating this much better.   - Unfortunately, he is overall more fatigued, spending more time in bed, with worsening nausea, abdominal pain.  -  Will proceed with treatment today.  Will repeat  today and repeat CT CAP prior to next cycle of treatment.  -  Will also check UA/UC to make sure he doesn't have concurrent UTI.      2. LBP:  -Currently improved/tolerable. We increased his pain medication to oxycodone 10 mg (1-2 tabs) every 4 hours as needed. He has been taking 1 tablet every 4 hours which has been better controlling his pain. However, he has noticed increased pain around his neulasta injection. Encouraged him to liberalize use to help control his symptoms.  Continue Claritin BID around treatment to help with bony pain related to neulasta.   -U/A was previously checked and normal. Also obtained CT of lumbar spine to further evaluate which showed persistent degenerative disc changes of lumbar spine, most prominent at L4-5 with no significant changed since previous exam in 2017.   -Will  monitor.     3.  N/V/Dehydration:  - Currently doing somewhat worse in this regard. Continue Zofran TID around treatment and compazine as needed.     -  Continue IVF to 3x/week which has been helpful.  Will give an extra L of NS today.  -Will monitor.     4.  Neoplasm related pain:  - Continue Oxycodone PRN as above.  Encouraged him to liberalize use to help control his symptoms.    5.  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  He previously took oral iron but did not absorb it.    - He received Feraheme. Iron studies today most c/w AOCD.      6.  Constipation:  - Currently denies. Continue Senna/Colace and Miralax prn.      7.   History of Atrial fibrillation:  Chronically anticoagulated on Eliquis.      8.  Prophylaxis:  Has had 2018 influenza vaccine.      9.  ACO Quality measures  - Todd Phillips does not use tobacco products.  -- Todd Phillips received 2018 flu vaccine.  - Current outpatient and discharge medications have been reconciled for the patient.  Reviewed by: Gwen Alves MD     This note was scribed for Gwen Alves MD by Tamara Morejon RN.    I, Gwen Alves MD, personally performed the services described in this documentation as scribed by the above named individual in my presence, and it is both accurate and complete.  07/09/2019       I spent 30 minutes with Todd Phillips today.  More than 50% of the time was spent in counseling / coordination of care for the above problems.    Electronically Signed by: VIMAL Rolle      CC:   MD Miquel Quintana MD Aaron House, MD Michael Simons, MD

## 2019-07-17 ENCOUNTER — HOSPITAL ENCOUNTER (OUTPATIENT)
Dept: ONCOLOGY | Facility: HOSPITAL | Age: 69
Setting detail: INFUSION SERIES
Discharge: HOME | End: 2019-07-17

## 2019-07-17 ENCOUNTER — DOCUMENTATION (OUTPATIENT)
Dept: ONCOLOGY | Facility: CLINIC | Age: 69
End: 2019-07-17

## 2019-07-17 VITALS
TEMPERATURE: 98.2 F | WEIGHT: 161 LBS | RESPIRATION RATE: 16 BRPM | DIASTOLIC BLOOD PRESSURE: 58 MMHG | BODY MASS INDEX: 21.81 KG/M2 | HEART RATE: 93 BPM | HEIGHT: 72 IN | SYSTOLIC BLOOD PRESSURE: 91 MMHG

## 2019-07-17 DIAGNOSIS — E86.0 DEHYDRATION: Primary | ICD-10-CM

## 2019-07-17 DIAGNOSIS — C25.0 MALIGNANT NEOPLASM OF HEAD OF PANCREAS (HCC): ICD-10-CM

## 2019-07-17 PROCEDURE — 96360 HYDRATION IV INFUSION INIT: CPT

## 2019-07-17 PROCEDURE — 96361 HYDRATE IV INFUSION ADD-ON: CPT

## 2019-07-17 RX ORDER — SODIUM CHLORIDE 0.9 % (FLUSH) 0.9 %
10 SYRINGE (ML) INJECTION AS NEEDED
Status: CANCELLED | OUTPATIENT
Start: 2019-07-23

## 2019-07-17 RX ORDER — SODIUM CHLORIDE 9 MG/ML
999 INJECTION, SOLUTION INTRAVENOUS ONCE
Status: CANCELLED | OUTPATIENT
Start: 2019-07-17

## 2019-07-17 RX ORDER — SODIUM CHLORIDE 0.9 % (FLUSH) 0.9 %
10 SYRINGE (ML) INJECTION AS NEEDED
Status: DISCONTINUED | OUTPATIENT
Start: 2019-07-17 | End: 2019-07-18 | Stop reason: HOSPADM

## 2019-07-17 RX ORDER — SODIUM CHLORIDE 9 MG/ML
999 INJECTION, SOLUTION INTRAVENOUS ONCE
Status: COMPLETED | OUTPATIENT
Start: 2019-07-17 | End: 2019-07-17

## 2019-07-17 RX ADMIN — HEPARIN 500 UNITS: 100 SYRINGE at 12:44

## 2019-07-17 RX ADMIN — SODIUM CHLORIDE 999 ML/HR: 9 INJECTION, SOLUTION INTRAVENOUS at 11:30

## 2019-07-17 RX ADMIN — SODIUM CHLORIDE, PRESERVATIVE FREE 10 ML: 5 INJECTION INTRAVENOUS at 12:44

## 2019-07-18 ENCOUNTER — HOSPITAL ENCOUNTER (OUTPATIENT)
Dept: CT IMAGING | Facility: HOSPITAL | Age: 69
Discharge: HOME | End: 2019-07-18
Admitting: INTERNAL MEDICINE

## 2019-07-18 ENCOUNTER — HOSPITAL ENCOUNTER (OUTPATIENT)
Dept: CT IMAGING | Facility: HOSPITAL | Age: 69
Discharge: HOME | End: 2019-07-18

## 2019-07-18 DIAGNOSIS — C25.0 MALIGNANT NEOPLASM OF HEAD OF PANCREAS (HCC): ICD-10-CM

## 2019-07-18 PROCEDURE — 74177 CT ABD & PELVIS W/CONTRAST: CPT | Performed by: RADIOLOGY

## 2019-07-18 PROCEDURE — 0 IOVERSOL 68 % SOLUTION: Performed by: INTERNAL MEDICINE

## 2019-07-18 PROCEDURE — 71260 CT THORAX DX C+: CPT | Performed by: RADIOLOGY

## 2019-07-18 PROCEDURE — 74177 CT ABD & PELVIS W/CONTRAST: CPT

## 2019-07-18 PROCEDURE — 71260 CT THORAX DX C+: CPT

## 2019-07-18 RX ADMIN — IOVERSOL 100 ML: 678 INJECTION INTRA-ARTERIAL; INTRAVENOUS at 10:53

## 2019-07-21 DIAGNOSIS — C25.0 MALIGNANT NEOPLASM OF HEAD OF PANCREAS (HCC): ICD-10-CM

## 2019-07-21 RX ORDER — PACLITAXEL 100 MG/20ML
87.5 INJECTION, POWDER, LYOPHILIZED, FOR SUSPENSION INTRAVENOUS ONCE
Status: CANCELLED | OUTPATIENT
Start: 2019-07-23

## 2019-07-21 RX ORDER — SODIUM CHLORIDE 9 MG/ML
250 INJECTION, SOLUTION INTRAVENOUS ONCE
Status: CANCELLED | OUTPATIENT
Start: 2019-07-30

## 2019-07-21 RX ORDER — PACLITAXEL 100 MG/20ML
87.5 INJECTION, POWDER, LYOPHILIZED, FOR SUSPENSION INTRAVENOUS ONCE
Status: CANCELLED | OUTPATIENT
Start: 2019-07-30

## 2019-07-21 RX ORDER — SODIUM CHLORIDE 9 MG/ML
250 INJECTION, SOLUTION INTRAVENOUS ONCE
Status: CANCELLED | OUTPATIENT
Start: 2019-07-23

## 2019-07-22 RX ORDER — LEVOFLOXACIN 500 MG/1
500 TABLET, FILM COATED ORAL DAILY
Qty: 10 TABLET | Refills: 0 | Status: SHIPPED | OUTPATIENT
Start: 2019-07-22 | End: 2019-08-01

## 2019-07-22 RX ORDER — BENZONATATE 100 MG/1
200 CAPSULE ORAL 3 TIMES DAILY PRN
Qty: 90 CAPSULE | Refills: 2 | Status: ON HOLD | OUTPATIENT
Start: 2019-07-22 | End: 2019-08-06

## 2019-07-22 NOTE — PROGRESS NOTES
NAME: Todd Phillips    : 1950    DATE : 19    DIAGNOSIS:   1.   Stage III moderately differentiated adenocarcinoma of the head of the pancreas with involvement of the duodenum ([id number]).    2.  Repeated episodes of bacteremia due to cholangitis -    3.  Refractory nausea/vomiting    CHIEF COMPLAINT:  Follow up of pancreatic cancer    TREATMENT:  1.       2.  On 18, Dr. Brody performed  Pancreaticoduodenectomy, wedge biopsy of the liver, portal vein resection and lateral repair and gastrojejunostomy tube placement    3.           HISTORY OF PRESENT ILLNESS:   Todd Phillips is a very pleasant 69 y.o. male who is being seen today at the request of Dr. Miquel Brody for evaluation and treatment of pancreatic cancer.  Mr. Phillips initially developed symptoms in 2018. At that time he had pain in his upper abdomen which would radiate to his back.  In July, the pain intensified.  He was seen in the ER and also by Dr. Su.  He underwent RUQ U/S and then HIDA scan and it was determined he had a dysfunctional gallbladder.  He saw Dr. Downs and had cholecystectomy on 8-10-18.  Pathology showed chronic cholecystitis and an incidental benign lymph node.  He re-presented in the post-operative period with jaundice.  Dr. Su performed ERCP on18.  He was noted to have a 3 cm irregular tight stricture of the distal common bile duct with proximal biliary ductal dilatation.  Biliary papillotomy and stricture dilatation performed and brushings taken.  A 10 Fr x 5 cm biliary stent was placed.  Brushings were taken but were negative.  There was sl dilatation of the distal pancreatic duct without discrete stricture.   He was discharged with improving jaundice on .  On  he represented with sepsis due to Klebsiella pneumonia bacteremia and was admitted.  He then was referred to Dr. Brody for evaluation and treatment.  He has been set up for EUS with biopsy next Friday for what appears to  be a primary pancreatic malignancy in the head of the pancreas.  Dr. Brody has referred him for consideration of neoadjuvant therapy.    INTERVAL HISTORY:  Mr. Phillips is here today with his son for follow up of pancreatic cancer. He is feeling fairly well today. He reports cough and fever x 1 day. He started a course of Levaquin yesterday. He reports pain across his lower abdomen and lower back.  He is taking Oxycodone 10 mg 1-2 tabs every 4-5 hours for pain. He feels the pain is worse with Neulasta injections. He is taking Ibuprofen and Claritin as directed. He reports occasional constipation and uses suppositories and Linzess PRN. He is otherwise well and without complaint.       PAST MEDICAL HISTORY:  Past Medical History:   Diagnosis Date   • Antral gastritis    • Asthma    • Atrial fibrillation (CMS/HCC)     treated with oral blood thinner   • Chest pain    • COPD (chronic obstructive pulmonary disease) (CMS/HCC)    • Coronary artery disease     no stents   • Diabetes mellitus (CMS/HCC)     type 2 - checks sugar 4 times per day    • Duodenitis    • Elevated cholesterol    • Emphysema of lung (CMS/HCC)    • Gallbladder disease     removed    • GERD (gastroesophageal reflux disease)    • Hard to intubate     busted lip last time   • Hyperlipidemia    • Hypertension    • Jaundice    • Kidney stone    • Kidney stones     had lithotripsy and passed 36 kidney stones as well as had one surgically removed.   • Malignant neoplasm of head of pancreas (CMS/HCC) 9/5/2018   • Nausea    • Obstructive sleep apnea on CPAP     compliant with machine    • Palpitations    • Pneumonia 08/2018   • Reflux esophagitis    • Renal failure     stage 3 kidney disease   • Sepsis (CMS/HCC)        PAST SURGICAL HISTORY:  Past Surgical History:   Procedure Laterality Date   • BILE DUCT STENT PLACEMENT      [facility] 2 weeks ago    • CARDIAC CATHETERIZATION  11/01/1999   • CARDIOVASCULAR STRESS TEST  09/2012   •  CHOLECYSTECTOMY N/A 8/10/2018    Procedure: CHOLECYSTECTOMY LAPAROSCOPIC;  Surgeon: Ronak Downs MD;  Location:  [facility] OR;  Service: General   • COLONOSCOPY     • CYSTOSCOPY BLADDER STONE LITHOTRIPSY     • ECHO - CONVERTED  09/2012   • ERCP N/A 8/14/2018    Procedure: ENDOSCOPIC RETROGRADE CHOLANGIOPANCREATOGRAPHY WITH PAPILLOTOMY;  Surgeon: Scot Su MD;  Location:  [facility] OR;  Service: Gastroenterology   • ERCP N/A 10/1/2018    Procedure: ENDOSCOPIC RETROGRADE CHOLANGIOPANCREATOGRAPHY;  Surgeon: Jefry Luna MD;  Location:  [facility];  Service: Gastroenterology   • KIDNEY STONE SURGERY     • KIDNEY STONE SURGERY      open abdominal surgery   • PORTACATH PLACEMENT Right 10/23/2018    Procedure: INSERTION OF PORTACATH;  Surgeon: Ronak Downs MD;  [facility];  Service: General   • TONSILLECTOMY     • UPPER GASTROINTESTINAL ENDOSCOPY  08/30/2012   • WHIPPLE PROCEDURE N/A 12/31/2018    Procedure: WHIPPLE PROCEDURE, BIOPSY OF LIVER, PORTAL VEIN RESECTION AND REPAIR, G/J TUBE PLACEMENT;  Surgeon: Miquel Brody MD;  Location:  [facility] OR;  Service: General       FAMILY HISTORY:  Family History   Problem Relation Age of Onset   • Heart attack Mother    • Heart disease Mother    • Stroke Mother    • Kidney disease Mother    • Heart failure Mother    • Lung disease Father    • Tuberculosis Father    • Diabetes Sister    • Heart attack Brother    • Heart failure Brother    • Heart disease Brother    • Diabetes Sister    • No Known Problems Brother    • No Known Problems Brother        SOCIAL HISTORY:  Social History     Socioeconomic History   • Marital status:      Spouse name: Not on file   • Number of children: Not on file   • Years of education: Not on file   • Highest education level: Not on file   Tobacco Use   • Smoking status: Never Smoker   • Smokeless tobacco: Never Used   Substance and Sexual Activity   • Alcohol use: No   • Drug use: No   • Sexual  activity: Defer     Partners: Female   Social History Narrative    He is  and lives alone with his wife although they have 3 children together who all live close by. He is retired from the [employer] and was exposed to Agent Orange during [place]. He is a lifelong nonsmoker.         A DIL  of cancer.    REVIEW OF SYSTEMS:   A comprehensive 14 point review of systems was performed.  Significant findings as mentioned above.  All other systems reviewed and are negative.      MEDICATIONS:  The current medication list was reviewed in the EMR    Current Outpatient Medications:   •  albuterol (PROVENTIL HFA;VENTOLIN HFA) 108 (90 BASE) MCG/ACT inhaler, Inhale 2 puffs Every 4 (Four) Hours As Needed for Wheezing or Shortness of Air., Disp: , Rfl:   •  albuterol (PROVENTIL) (2.5 MG/3ML) 0.083% nebulizer solution, Take 2.5 mg by nebulization Every 6 (Six) Hours As Needed for Wheezing or Shortness of Air., Disp: , Rfl:   •  allopurinol (ZYLOPRIM) 100 MG tablet, Take 1 tablet by mouth Daily., Disp: 90 tablet, Rfl: 0  •  apixaban (ELIQUIS) 5 MG tablet tablet, Take 1 tablet by mouth Every 12 (Twelve) Hours., Disp: 60 tablet, Rfl:   •  benzonatate (TESSALON PERLES) 100 MG capsule, Take 2 capsules by mouth 3 (Three) Times a Day As Needed for Cough., Disp: 90 capsule, Rfl: 2  •  cholecalciferol (VITAMIN D3) 1000 units tablet, Take 1,000 Units by mouth Daily., Disp: , Rfl:   •  dexamethasone (DECADRON) 4 MG tablet, Take 2 tablets by mouth in the morning on days 2, 3, and 4 after chemo., Disp: 12 tablet, Rfl: 2  •  flecainide (TAMBOCOR) 50 MG tablet, Take 50 mg by mouth Every 12 (Twelve) Hours., Disp: , Rfl:   •  FLUoxetine (PROzac) 20 MG capsule, Take 20 mg by mouth 2 (Two) Times a Day., Disp: , Rfl:   •  insulin lispro (humaLOG) 100 UNIT/ML injection, Inject  under the skin into the appropriate area as directed 3 (Three) Times a Day As Needed (before meals prn (2 units if > 150-199, 4 units if > 199))., Disp: , Rfl:   •   lactobacillus acidophilus (RISAQUAD) capsule capsule, Take 1 capsule by mouth Daily., Disp: 30 capsule, Rfl: 0  •  levoFLOXacin (LEVAQUIN) 500 MG tablet, Take 1 tablet by mouth Daily for 10 days., Disp: 10 tablet, Rfl: 0  •  mometasone (ASMANEX) 220 MCG/INH inhaler, Inhale 2 puffs Every Night., Disp: , Rfl:   •  montelukast (SINGULAIR) 10 MG tablet, Take 10 mg by mouth Every Night., Disp: , Rfl:   •  nitroglycerin (NITROSTAT) 0.4 MG SL tablet, Place 0.4 mg under the tongue as needed for chest pain., Disp: , Rfl:   •  omeprazole (priLOSEC) 40 MG capsule, Take 40 mg by mouth Daily., Disp: , Rfl:   •  ondansetron (ZOFRAN) 8 MG tablet, Take 1 tablet by mouth Every 8 (Eight) Hours As Needed for Nausea or Vomiting., Disp: 60 tablet, Rfl: 3  •  oxyCODONE (ROXICODONE) 10 MG tablet, Take 1-2 tabs every 4-6 hours as needed for pain, Disp: 100 tablet, Rfl: 0  •  pancrelipase, Lip-Prot-Amyl, (CREON) 94736 units capsule delayed-release particles capsule, Take 12,000 units of lipase by mouth 3 (Three) Times a Day With Meals., Disp: , Rfl:   •  polyethylene glycol (MIRALAX) packet, Take 17 g by mouth Daily As Needed., Disp: , Rfl:   •  prochlorperazine (COMPAZINE) 10 MG tablet, Take 1 tablet by mouth Every 6 (Six) Hours As Needed for Nausea or Vomiting., Disp: 60 tablet, Rfl: 3  •  promethazine (PHENERGAN) 12.5 MG tablet, Take 1 tablet by mouth Every 6 (Six) Hours As Needed for Nausea or Vomiting., Disp: 90 tablet, Rfl: 3  •  promethazine (PHENERGAN) 25 MG suppository, Insert 1 suppository into the rectum Every 6 (Six) Hours As Needed for Nausea or Vomiting., Disp: 10 each, Rfl: 0  •  rifaximin (XIFAXAN) 550 MG tablet, Take 550 mg by mouth 2 (Two) Times a Day., Disp: , Rfl:   •  Tiotropium Bromide-Olodaterol 2.5-2.5 MCG/ACT aerosol solution, Inhale 2 puffs Daily., Disp: , Rfl:   No current facility-administered medications for this visit.     Facility-Administered Medications Ordered in Other Visits:   •  sodium chloride 0.9 %  infusion  - ADS Override Pull, , , ,   •  sodium chloride 0.9 % infusion  - ADS Override Pull, , , ,     ALLERGIES:    Allergies   Allergen Reactions   • Chlorhexidine Hives and Rash   • Contrast Dye Other (See Comments)     Can't have due to kidney failure per family   • Fentanyl Confusion and Unknown (See Comments)     Patient family reports extreme symptoms with fentanyl. Including confusion, restlessness, irritability an heavy sedation.     PHYSICAL EXAM:  Vitals:    07/23/19 1236   BP: 92/61   Pulse: 106   Resp: 16   Temp: 98.8 °F (37.1 °C)   SpO2: 90%     Pain Score    07/23/19 1236   PainSc:   3   General:  Alert and oriented. Appears fatigued today, chronically-ill appearing.   HEENT:  Pupils are equal, round and reactive to light and accommodation, Extra-ocular movements full, Oropharyx clear, MM sl dry  Neck:  No JVD, thyromegaly or lymphadenopathy  CV:  Regular rate/rhythm, no murmurs, rubs or gallops  Resp:  Lungs are clear to auscultation bilaterally  Abd:  Soft, sl diffuse tenderness with palpation, non-distended, bowel sounds intact, no organomegaly or masses.  Surgical scars present.   Ext:  No clubbing, cyanosis or edema.    Lymph:  No cervical, supraclavicular, axillary,adenopathy  Neuro: grossly non-focal exam    PATHOLOGY:  08-15-18         12-31-18 Whipple  Final Diagnosis   1. LIVER, WEDGE BIOPSY:  Small bland ductular proliferation compatible with bile duct adenoma.   Negative for metastatic carcinoma.   2. OMENTUM:  Mild chronic inflammation and surface adhesions; negative for neoplasm.   3. HEPATIC ARTERY LYMPH NODE:  Sinus histiocytosis; single lymph node negative for metastatic carcinoma. (0/1)  4. SUBMITTED BILE DUCT MARGIN:  Margin with chronic inflammation; negative for adenocarcinoma.   5. AORTA-CAVAL LYMPH NODES:  Two lymph nodes negative for metastatic carcinoma. (0/2)  6. PANCREATODUODENECTOMY (WHIPPLE PROCEDURE);  Invasive moderately differentiated adenocarcinoma arising in head of  pancreas and involving duodenum.  Gross tumor size 3.7 cm in greatest dimension.  Perineural and peripancreatic extension identified.   Soft tissue at superior mesenteric artery positive for neoplasm.  Posterior/uncinate margin involved by neoplasm.  Five of twelve peripancreatic lymph nodes positive for metastatic neoplasm. See Template.        PANCREAS EXOCRINE:  TYPE OF SPECIMEN/PROCEDURE: Whipple procedure  SPECIMEN INTEGRITY: Intact  TUMOR SITE (HEAD, BODY, TAIL): Head  TUMOR SIZE (GREATEST DIMENSION): 3.7 cm greatest dimension  HISTOLOGIC TYPE: Adenocarcinoma  HISTOLOGIC rdGrdRrdArdDrdErd:rd rd3rd TUMOR EXTENSION: Neoplasm involves duodenum and peripancreatic fat  SURGICAL MARGINS, IF INVOLVED, (SPECIFY WHERE): Involved  MARGINS EXAMINED: See below  PARENCHYMAL MARGIN: Not involved  UNCINATE: Involved  BILE DUCT: Not involved  DISTAL MARGIN: Not involved   PROXIMAL MARGIN: Not involved   OTHER MARGINS: SMA margin involved   CLOSEST MARGIN: Margins involved    SPECIFIC MARGIN: SMA and posterior/uncinate  INVADES CELIAC AXIS OR SMA: No  VASCULAR/LYMPHATIC INVASION: Present  PERINEURAL INVASION:  Present  REGIONAL LYMPH NODES: Submitted  NUMBER OF LYMPH NODES INVOLVED: 5  NUMBER OF LYMPH NODES EXAMINED: 15  EXTRACAPSULAR EXTENSION: Focally present  TREATMENT EFFECT: No known presurgical therapy  ADDITIONAL PATHOLOGIC FINDINGS: Chronic pancreatitis   OTHER STUDIES: Available upon request  AJCC PATHOLOGIC STAGE: (COMPLETED BY PATHOLOGIST BASED ONLY ON TISSUE FINDINGS, MORE EXTENSIVE DISEASE MAY NOT BE KNOW TO THE PATHOLOGIST)  pT=  2   pN= 2  AJCC PATHOLOGIC STAGE: III     1-11-19 Abdominal Fluid:  Final Diagnosis    ABDOMINAL FLUID:  Negative for malignancy.  Acute inflammation and necrotic debris.  No tumor seen.         ENDOSCOPY:  08-14-18 ERCP with papillotomy, balloon rotation of the biliary stricture, pathology brushings, ileum stent placement ([place])  1.  Irregular 3 cm distal common bile duct stricture concerning for  neoplastic disease. Biliary papillotomy, stricture dilatation by  through the scope balloon, cytology brushings performed and 10 Slovenian by 5 cm biliary stent placed.    2.  Dilated common hepatic duct to 15 mm when fully contrast filled.  Dilated intrahepatic biliary tree.    3.  No stones proximal to the stricture were identified.    4.  Slight dilatation of the distal pancreatic duct without a discrete stricture identified.    IMAGIN18 CT Abdomen Pelvis Stone Protocol   Findings  - LOWER THORAX: Clear. No effusions.  - LIVER: Homogeneous. No focal hepatic mass or ductal dilatation.  - GALLBLADDER: MINIMAL STRANDING AROUND REGION OF GALLBLADDER FOSSA  THAT MAY BE POSTSURGICAL.  - PANCREAS: Unremarkable. No mass or ductal dilatation.  - SPLEEN: Homogeneous. No splenomegaly.  - ADRENALS: No mass.  - KIDNEYS: No mass. No obstructive uropathy.  No evidence of urolithiasis.  - GI TRACT: SMALL HIATAL HERNIA.  - PERITONEUM: No free air. No free fluid or loculated fluid collections.  - MESENTERY: Unremarkable.  - LYMPH NODES: No lymphadenopathy.  - VASCULATURE: ATHEROSCLEROTIC VASCULAR CALCIFICATION.  - ABDOMINAL WALL: No focal hernia or mass.  - OTHER: None.  - BLADDER: No focal mass or significant wall thickening  - REPRODUCTIVE: Unremarkable as visualized.  - APPENDIX: Nondistended. No surrounding inflammation.  - BONES: No acute bony abnormality.     IMPRESSION:  - Minimal stranding around region of gallbladder fossa that may be postsurgical.     18 US Liver   FINDINGS:   - Visualized pancreas is unremarkable.   - The gallbladder is absent. No collection identified.   - The CBD measures 10.78488062132 mm    .  - The liver demonstrates normal echogenicity without focal lesion.    - No ascites demonstrated.        IMPRESSION:  - As above.    18 CT Abdomen Pelvis Stone Protocol   FINDINGS:   - Minimal bibasilar atelectasis.  - Hiatal hernia.  - The liver is homogeneous. There is no evidence of focal  hepatic mass.  - The spleen is homogeneous.  - Stent is noted traversing the common bile duct.  - 3 mm nodule in the right lung on image 8 of the axial series.  - There is no adrenal enlargement.  - The kidneys show no evidence of hydronephrosis or hydroureter. I do not see any distal ureteral stones.   - Otherwise I do not see any free fluid or walled off fluid collections.  - There is moderate to large volume stool in the colon.  - There is no evidence of mesenteric or retroperitoneal adenopathy.     IMPRESSION:  1. Tiny nodule in the right lung base.  2. Minimal bibasilar atelectasis.  3. Moderate to large volume stool in the colon.     Other findings as above.    08-22-18 CT Abdomen Pelvis With Contrast   FINDINGS:   - Tiny left basilar nodule measuring 4 mm on image 14 of the axial series.  - Minimal bibasilar atelectasis.  - The liver is homogeneous. There is no evidence of focal hepatic mass  - The spleen is homogeneous  - Indistinct appearance of the pancreatic head. A biliary stent is present.  - Small left adrenal nodule is present.  - The kidneys show no evidence of hydronephrosis or hydroureter. I do not see any distal ureteral stones.   - Otherwise I do not see any free fluid or walled off fluid collections.  - Large volume stool is present in the colon.     IMPRESSION:  1. Slightly enlarged, indistinct appearance of the pancreatic head.  - Underlying neoplasm certainly not excluded but no delineable mass is present. There is a biliary stent.    2. Tiny nodule in the left lung base.    3. Small left adrenal nodule.    4. Hiatal hernia.    5. Moderate to large volume stool in the colon.      09-11-18 CT Chest Without Contrast   FINDINGS:   - Minimal coronary artery calcifications present.  - No pericardial or pleural effusions.  - No parenchymal soft tissue nodules or masses. There are findings of COPD.     IMPRESSION:  - COPD.  - Hiatal hernia.  - Minimal coronary artery calcification.       09-11-18 CT  Abdomen Pelvis Without Contrast   FINDINGS:   - Lung bases show mild increased interstitial markings.  - There is a hiatal hernia.  - The liver is homogeneous. There is no evidence of focal hepatic mass.  - The spleen is homogeneous.  - Mildly indistinct appearance of the pancreas. There is a biliary stent present. I cannot exclude mild degree of pancreatitis..  - There is no adrenal enlargement.  - The kidneys show no evidence of hydronephrosis or hydroureter. I do not see any distal ureteral stones.   - Otherwise I do not see any free fluid or walled off fluid collections.  - Large volume stool is present in the colon.  - Urinary bladder wall is slightly thickened.  - There is no evidence of mesenteric or retroperitoneal adenopathy.    IMPRESSION:  1. Mildly indistinct appearance of the proximal pancreas.  2. Urinary bladder wall thickening.  3. Large volume stool in the colon.    09-14-18 CT Abdomen Without Contrast   FINDINGS:   - Again noted is very mild indistinct appearance of the pancreatic head. Stent is stable in position.  - The liver is stable and homogeneous.  - Spleen is homogeneous.  - No adrenal enlargement.  - Moderate to large volume stool in the colon.     IMPRESSION:  - No significant interval change since the previous exam.     09-21-18 NM Pet Skull Base To Mid Thigh   FINDINGS:      HEAD/NECK:  - No FDG hypermetabolic neck adenopathy.  - No FDG hypermetabolic masses.     CHEST:   - No FDG hypermetabolic thoracic adenopathy.  - No FDG hypermetabolic lung nodules or masses.  - Evaluation for tiny parenchymal nodules is somewhat limited on low dose CT secondary to respiratory motion.     ABDOMEN/PELVIS:   - There is hypermetabolic activity in the pancreatic head with a maximum SUV of 4.971.  - Physiologic FDG hypermetabolism seen throughout the GI tract.  - Physiologic FDG hypermetabolism seen throughout the mesentery, retroperitoneum and pelvis.     BONES:   - There are scattered foci of FDG  hypermetabolism most suggestive of degenerative change seen throughout the axial skeleton. If concern persist for metastatic lesions of the bone, HDP Bone scan is recommended for further evaluation.     IMPRESSION:  - Abnormal hypermetabolic activity corresponding to area of indistinctness in the pancreatic head. Maximum SUV is 4.97.    1-20-19 CT CAP with contrast:  Today's study shows mild coronary artery calcifications.     There are bilateral pleural effusions, right greater than left.     There is bibasilar consolidation. I cannot exclude pneumonia.     No parenchymal masses are seen.     IMPRESSION:  Bibasilar effusions and bibasilar consolidation.     There are bibasilar pleural effusions and there is bibasilar  consolidation.     Patient has a percutaneous gastric tube.     There is pneumoperitoneum. This may be from the percutaneous gastric  tube. The tube appears to remain intraluminal but left lateral aspect  could extend beyond the confines of the bowel.     There is a perihepatic fluid collection which contains air and is  concerning for perihepatic abscess.     The liver is homogeneous. There is no evidence of focal hepatic mass     The spleen is homogeneous     There is no peripancreatic stranding or pancreatic head mass.     There is no adrenal enlargement.     The kidneys show no evidence of hydronephrosis or hydroureter. I do not  see any distal ureteral stones.      Small volume ascites is present.     There is no bowel wall thickening or mesenteric stranding.     IMPRESSION:  1. Bibasilar effusions and bibasilar consolidation.  2. Loculated perihepatic fluid collection containing air. This is  concerning for an abscess.  3. Percutaneous gastric tube is present. The tip appears to remain  intraluminal. There is, however, single focus of pneumoperitoneum which  may be associated with gastric tube placement.  4. Small volume ascites.    CT A/P without Contrast 1-24-19:  The percutaneous drain placed  on 01/11/2019 and the  perihepatic abscess has been removed. There is persistent loculated  perihepatic fluid although this has markedly improved since prior to  drain placement; the collection did measure 8 x 22 x 9 cm before  drainage as one large collection and now is split into two possibly  separate smaller collections, the more posterior of the two measuring 2  x 10 x 2 cm and the more superior 4 x 6 x 3 cm (the collections may be  narrowly connected).     Percutaneous gastrojejunostomy is in place; status post Whipple. There  is a small amount of low density abdominopelvic free fluid. There is gas  in the nondependent portion of the urinary bladder, probably from recent  instrumentation. There is no evidence of bowel obstruction. Only a tiny  locule of free air persists in the mid abdomen deep to the midline  incision, improved. The kidneys, adrenal glands, pancreas and spleen are  unchanged.     There is mild body wall edema and subcutaneous gas from recent  medication injections. Small volume right and trace volume left pleural  effusions. There is near complete collapse of the right lower lobe but  there is only subsegmental atelectasis at the left lung base. No  pertinent osseous abnormality is seen.     IMPRESSION:  1. Marked improvement in the perihepatic subcapsular abscess. Drainage  catheter has been removed.  2. Unchanged low density free fluid. Unchanged small volume right and  trace left pleural effusions.    CTCAP 02-26-19:  Findings  LUNGS: BIBASILAR LUNG FIBROTIC CHANGE.  HEART: Unremarkable.  PERICARDIUM: No effusion.  MEDIASTINUM: No masses. No enlarged lymph nodes.  No fluid collections.  PLEURA: TINY LEFT PLEURAL EFFUSION.  MAJOR AIRWAYS: Clear. No intrinsic mass.  VASCULATURE: No evidence of aneurysm.     VISUALIZED UPPER ABDOMEN:  LIVER: Homogeneous. No focal hepatic mass or ductal dilatation.  SPLEEN: Homogeneous. No splenomegaly.  ADRENALS: No mass.  KIDNEYS: No mass. No obstructive  uropathy.  No evidence of urolithiasis.  GI TRACT: Non-dilated. No definite wall thickening  PERITONEUM: No free air. No free fluid or loculated fluid collections.  ABDOMINAL WALL: No focal hernia or mass.       OTHER: None.     BONES: No acute bony abnormality.     IMPRESSION:  Now only tiny pleural effusions.    Findings  LOWER THORAX: Clear. No effusions.     ABDOMEN:  LIVER: SUBCAPSULE LIVER COLLECTION MEASURING ABOUT 4.8 CM THAT WAS ABOUT 7.2 CM.  GALLBLADDER: No dilation or stone identified.  PANCREAS: Unremarkable. No mass or ductal dilatation.  SPLEEN: Homogeneous. No splenomegaly.  ADRENALS: No mass.  KIDNEYS: No mass. No obstructive uropathy.  No evidence of urolithiasis.  GI TRACT: Non-dilated. No definite wall thickening.  PERITONEUM: TINY ASCITES.  MESENTERY: Unremarkable.  LYMPH NODES: No lymphadenopathy.  VASCULATURE: No evidence of aneurysm.  ABDOMINAL WALL: No focal hernia or mass.     OTHER: None.     PELVIS:  BLADDER: No focal mass or significant wall thickening  REPRODUCTIVE: Unremarkable as visualized.  APPENDIX: Nondistended. No surrounding inflammation.  BONES: No acute bony abnormality.     IMPRESSION:  Still tiny ascites. Decreased size of a subhepatic collection.      PET/CT 03-29-19:  FINDINGS:      HEAD/NECK:  No FDG hypermetabolic neck adenopathy.  No FDG hypermetabolic masses.     CHEST:   No FDG hypermetabolic thoracic adenopathy.  No FDG hypermetabolic lung nodules or masses.  Evaluation for tiny parenchymal nodules is somewhat limited on low dose  CT secondary to respiratory motion.     ABDOMEN/PELVIS:   Physiologic FDG hypermetabolism seen throughout the solid abdominal  organs.  There is hypermetabolic activity along the anterior margin of the liver.  This appears to correspond to trace subcapsular fluid. The maximum SUV  was 5.385. This is abnormal.  There is a tiny nodular density anterior to the right of midline in the  peritoneum on image 234 of the axial series. This shows  maximum SUV of  3.057. Also hypermetabolic activity to the left of midline adjacent to  surgical clips in the anterior abdomen. This shows a maximum SUV of  4.206.     Physiologic FDG hypermetabolism seen throughout the mesentery,  retroperitoneum and pelvis.     BONES: There are scattered foci of FDG hypermetabolism most suggestive  of degenerative change seen throughout the axial skeleton. If concern  persist for metastatic lesions of the bone, HDP Bone scan is recommended  for further evaluation.     IMPRESSION:     1. Hypermetabolic activity along the anterior margin of the liver which  appears to correspond with a small amount of subcapsular fluid but  concerning for active disease.  2. Tiny nodular density or possibly trace fluid in the peritoneum to the  right of midline as described above.  3. Hypermetabolic activity possibly bowel activity in the lower abdomen  to the left of midline adjacent to the surgical clip on image 247 of the  axial series. All of these areas could represent residual or metastatic  Disease.      CT L spine 06-03-19:  FINDINGS: Lumbar vertebral bodies are stable in height and alignment.  There continues to be degenerative disc change, most significant at  L4-L5 and L5-S1. There is gas formation in the nucleus of the disc at  L4-L5. The upper lumbar disc were better maintained in height. The facet  joints are appropriately aligned.     IMPRESSION:  Persistent degenerative disc change in the lumbar spine,  most prominent at L4-L5, not significantly changed radiographically from  the earlier exam in 2017.     CTCAP 07-18-19  Findings  LUNGS: PATCHY RIGHT UPPER LOBE OPACITY MAY REPRESENT PNEUMONIA.  HEART: Unremarkable.  PERICARDIUM: No effusion.  MEDIASTINUM: No masses. No enlarged lymph nodes.  No fluid collections.  PLEURA: No pleural effusion. No pleural mass or abnormal calcification.  MAJOR AIRWAYS: Clear. No intrinsic mass.  VASCULATURE: No evidence of aneurysm.     VISUALIZED  UPPER ABDOMEN:  LIVER: Homogeneous. No focal hepatic mass or ductal dilatation.  SPLEEN: Homogeneous. No splenomegaly.  ADRENALS: No mass.  KIDNEYS: No mass. No obstructive uropathy.  No evidence of  urolithiasis.  GI TRACT: Non-dilated. No definite wall thickening.  PERITONEUM: No free air. No free fluid or loculated fluid  collections.  ABDOMINAL WALL: No focal hernia or mass.     OTHER: None.     BONES: No acute bony abnormality.     IMPRESSION:  Patchy right upper lobe opacity may represent pneumonia.    Findings  LOWER THORAX: Clear. No effusions.     ABDOMEN:  LIVER: Homogeneous. No focal hepatic mass or ductal dilatation.  GALLBLADDER: No dilation or stone identified.  PANCREAS: CHANGES OF PANCREAS WHIPPLE PROCEDURE ARE AGAIN NOTED.  SPLEEN: Homogeneous. No splenomegaly.  ADRENALS: No mass.  KIDNEYS: No mass. No obstructive uropathy.  No evidence of  urolithiasis.  GI TRACT: Non-dilated. No definite wall thickening.  PERITONEUM: No free air. No free fluid or loculated fluid  collections.  MESENTERY: Unremarkable.  LYMPH NODES: No lymphadenopathy.  VASCULATURE: No evidence of aneurysm.  ABDOMINAL WALL: No focal hernia or mass.    OTHER: None.     PELVIS:  BLADDER: No focal mass or significant wall thickening  REPRODUCTIVE: Unremarkable as visualized.  APPENDIX: Nondistended. No surrounding inflammation.     BONES: No acute bony abnormality.     IMPRESSION:  Stable appearance of the abdomen.    RECENT LABS:  Lab Results   Component Value Date    WBC 11.26 (H) 07/23/2019    HGB 8.9 (L) 07/23/2019    HCT 28.5 (L) 07/23/2019    MCV 91.9 07/23/2019    RDW 16.8 (H) 07/23/2019     07/23/2019    NEUTRORELPCT 79.3 (H) 07/23/2019    LYMPHORELPCT 8.8 (L) 07/23/2019    MONORELPCT 8.3 07/23/2019    EOSRELPCT 0.8 07/23/2019    BASORELPCT 0.4 07/23/2019    NEUTROABS 8.93 (H) 07/23/2019    LYMPHSABS 0.99 07/23/2019       Lab Results   Component Value Date     07/23/2019    K 3.9 07/23/2019    CO2 24.2 07/23/2019      07/23/2019    BUN 15 07/23/2019    CREATININE 1.18 07/23/2019    EGFRIFNONA 61 07/23/2019    EGFRIFAFRI  09/02/2016      Comment:      <15 Indicative of kidney failure.    GLUCOSE 176 (H) 07/23/2019    CALCIUM 9.2 07/23/2019    ALKPHOS 261 (H) 07/23/2019    AST 17 07/23/2019    ALT 17 07/23/2019    BILITOT 0.3 07/23/2019    ALBUMIN 3.20 (L) 07/23/2019    PROTEINTOT 6.5 07/23/2019    MG 2.0 04/24/2019    PHOS 3.3 04/24/2019     Lab Results   Component Value Date    FERRITIN 392.40 07/09/2019    IRON 23 (L) 07/09/2019    TIBC 246 (L) 07/09/2019    LABIRON 9 (L) 07/09/2019    IXXNJVEL67 1,361 (H) 02/20/2019    FOLATE 19.70 02/20/2019       Lab Results   Component Value Date     82.0 (H) 06/24/2019     76.1 (H) 04/23/2019     129.6 (H) 03/20/2019     85 (H) 02/20/2019     34 01/22/2019     104 (H) 01/07/2019     1349 (H) 12/19/2018     1089 (H) 12/10/2018     1576 (H) 11/13/2018     5149 (H) 10/19/2018     7602 (H) 09/25/2018     3636 (H) 09/07/2018     4032 (H) 08/15/2018     ASSESSMENT & PLAN:  Todd Phillips is a very pleasant 69 y.o. male with Stage III moderately differentiated adenocarcinoma of the head of the pancreas with involvement of the duodenum ([id number]), now with evidence of anastomotic recurrence as well as involvement of the liver and rising .    1.  Pancreatic cancer:  -  Initially attempted neoadjuvant chemotherapy with Gemcitabine and Abraxane and he completed one cycle.  He became very weak and had to have TPN support.   -  He underwent surgical resection 12-31-18 and had Stage III disease ([id number]) moderately differentiated adenocarcinoma of the pancreas.  Tumor was 3.7 cm in maximal dimension and 5/15 associated LNs were involved.  Uncinate and SMA margins were involved.    -  There has been question about whether he should take further therapy (chemotherapy and / or radiation), but he had a very slow recovery and  wasn't able to take this promptly after surgery.  -   then started to rise, he developed obstructive symptoms and he has PET positivity at the small bowel anastomosis along with abnormal emptying study.  He also has an area of PET uptake along the anterior liver.  -  All of this is concerning for recurrent disease.  -  Recommended repeat trial of chemotherapy with Gemcitabine / Abraxane.    -  He started Gemcitabine / Abraxane.  He initially seemed to tolerate it well but unfortunately, developed pancytopenia / febrile neutropenia. Once his acute symptoms resolved, he was restarted with reduced dose of 20% and D15 was eliminated. With treatment, he has had increased fatigue/weakness. Therefore, reduced dose further to 30% with C4 and he reports tolerating this much better.   - Will give 2 more cycles of treatment and then plan for post-treatment PET (much of his initial disease was not visible on CT).  At that point we may consider radiation if indicated.     2. LBP:  -Continue oxycodone 10 mg (1-2 tabs) every 4 hours as needed.  Continue Claritin & Ibuprofen around treatment to help with bony pain related to neulasta.   -  CT of lumbar spine to further evaluate which showed persistent degenerative disc changes of lumbar spine, most prominent at L4-5 with no significant changed since previous exam in 2017.   -Will monitor.     3.  N/V/Dehydration:  - Currently doing somewhat worse in this regard. Continue Zofran TID around treatment and compazine as needed.     -  Continue IVF to 3x/week which has been helpful.     4. 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  He previously took oral iron but did not absorb it.    - He received Feraheme.  Recent iron studies today most c/w AOCD.      5.  Constipation:  - Currently denies. Continue Senna/Colace and Miralax prn.      6.   History of Atrial fibrillation:  Chronically anticoagulated on Eliquis.      7.   ACO Quality measures  - Todd Phillips does  not use tobacco products.  -- Todd Phillips received 2018 flu vaccine.  - Current outpatient and discharge medications have been reconciled for the patient.  Reviewed by: Gwen Alves MD     8. Follow up:   - will plan to complete 8 cycles of treatment and repeat PET/CT. I will see him back in follow up after this with labwork including CBCD, CMP, and CA 19-9 prior.     This note was scribed for Gwen Alves MD by Tamara Morejon RN.    I, Gwen Alves MD, personally performed the services described in this documentation as scribed by the above named individual in my presence, and it is both accurate and complete.  07/23/2019       I spent 25 minutes with Todd Phillips today.  More than 50% of the time was spent in counseling / coordination of care for the above problems.    Electronically Signed by: Gwen Alves MD       CC:   MD Miquel Quintana MD Aaron House, MD Michael Simons, MD

## 2019-07-23 ENCOUNTER — OFFICE VISIT (OUTPATIENT)
Dept: ONCOLOGY | Facility: CLINIC | Age: 69
End: 2019-07-23

## 2019-07-23 ENCOUNTER — LAB (OUTPATIENT)
Dept: ONCOLOGY | Facility: CLINIC | Age: 69
End: 2019-07-23

## 2019-07-23 ENCOUNTER — INFUSION (OUTPATIENT)
Dept: ONCOLOGY | Facility: HOSPITAL | Age: 69
End: 2019-07-23
Attending: INTERNAL MEDICINE

## 2019-07-23 VITALS
BODY MASS INDEX: 21.46 KG/M2 | WEIGHT: 158.2 LBS | OXYGEN SATURATION: 90 % | SYSTOLIC BLOOD PRESSURE: 92 MMHG | RESPIRATION RATE: 16 BRPM | DIASTOLIC BLOOD PRESSURE: 61 MMHG | HEART RATE: 106 BPM | TEMPERATURE: 98.8 F

## 2019-07-23 VITALS
OXYGEN SATURATION: 90 % | DIASTOLIC BLOOD PRESSURE: 61 MMHG | TEMPERATURE: 98.8 F | BODY MASS INDEX: 21.46 KG/M2 | SYSTOLIC BLOOD PRESSURE: 92 MMHG | RESPIRATION RATE: 16 BRPM | WEIGHT: 158.2 LBS | HEART RATE: 106 BPM

## 2019-07-23 DIAGNOSIS — C25.0 MALIGNANT NEOPLASM OF HEAD OF PANCREAS (HCC): Primary | ICD-10-CM

## 2019-07-23 DIAGNOSIS — C25.0 MALIGNANT NEOPLASM OF HEAD OF PANCREAS (HCC): ICD-10-CM

## 2019-07-23 DIAGNOSIS — R97.8 ELEVATED CA 19-9 LEVEL: ICD-10-CM

## 2019-07-23 DIAGNOSIS — G89.3 NEOPLASM RELATED PAIN: ICD-10-CM

## 2019-07-23 DIAGNOSIS — D50.9 IRON DEFICIENCY ANEMIA, UNSPECIFIED IRON DEFICIENCY ANEMIA TYPE: ICD-10-CM

## 2019-07-23 LAB
ACANTHOCYTES BLD QL SMEAR: NORMAL
ALBUMIN SERPL-MCNC: 3.2 G/DL (ref 3.5–5.2)
ALBUMIN/GLOB SERPL: 1 G/DL
ALP SERPL-CCNC: 261 U/L (ref 39–117)
ALT SERPL W P-5'-P-CCNC: 17 U/L (ref 1–41)
ANION GAP SERPL CALCULATED.3IONS-SCNC: 11.8 MMOL/L (ref 5–15)
ANISOCYTOSIS BLD QL: NORMAL
AST SERPL-CCNC: 17 U/L (ref 1–40)
BASOPHILS # BLD AUTO: 0.05 10*3/MM3 (ref 0–0.2)
BASOPHILS NFR BLD AUTO: 0.4 % (ref 0–1.5)
BILIRUB SERPL-MCNC: 0.3 MG/DL (ref 0.2–1.2)
BUN BLD-MCNC: 15 MG/DL (ref 8–23)
BUN/CREAT SERPL: 12.7 (ref 7–25)
CALCIUM SPEC-SCNC: 9.2 MG/DL (ref 8.6–10.5)
CHLORIDE SERPL-SCNC: 101 MMOL/L (ref 98–107)
CO2 SERPL-SCNC: 24.2 MMOL/L (ref 22–29)
CREAT BLD-MCNC: 1.18 MG/DL (ref 0.76–1.27)
DEPRECATED RDW RBC AUTO: 53.8 FL (ref 37–54)
EOSINOPHIL # BLD AUTO: 0.09 10*3/MM3 (ref 0–0.4)
EOSINOPHIL NFR BLD AUTO: 0.8 % (ref 0.3–6.2)
ERYTHROCYTE [DISTWIDTH] IN BLOOD BY AUTOMATED COUNT: 16.8 % (ref 12.3–15.4)
GFR SERPL CREATININE-BSD FRML MDRD: 61 ML/MIN/1.73
GLOBULIN UR ELPH-MCNC: 3.3 GM/DL
GLUCOSE BLD-MCNC: 176 MG/DL (ref 65–99)
HCT VFR BLD AUTO: 28.5 % (ref 37.5–51)
HGB BLD-MCNC: 8.9 G/DL (ref 13–17.7)
HYPOCHROMIA BLD QL: NORMAL
IMM GRANULOCYTES # BLD AUTO: 0.27 10*3/MM3 (ref 0–0.05)
IMM GRANULOCYTES NFR BLD AUTO: 2.4 % (ref 0–0.5)
LARGE PLATELETS: NORMAL
LYMPHOCYTES # BLD AUTO: 0.99 10*3/MM3 (ref 0.7–3.1)
LYMPHOCYTES NFR BLD AUTO: 8.8 % (ref 19.6–45.3)
MCH RBC QN AUTO: 28.7 PG (ref 26.6–33)
MCHC RBC AUTO-ENTMCNC: 31.2 G/DL (ref 31.5–35.7)
MCV RBC AUTO: 91.9 FL (ref 79–97)
MONOCYTES # BLD AUTO: 0.93 10*3/MM3 (ref 0.1–0.9)
MONOCYTES NFR BLD AUTO: 8.3 % (ref 5–12)
NEUTROPHILS # BLD AUTO: 8.93 10*3/MM3 (ref 1.7–7)
NEUTROPHILS NFR BLD AUTO: 79.3 % (ref 42.7–76)
OVALOCYTES BLD QL SMEAR: NORMAL
PLATELET # BLD AUTO: 435 10*3/MM3 (ref 140–450)
PMV BLD AUTO: 9.4 FL (ref 6–12)
POTASSIUM BLD-SCNC: 3.9 MMOL/L (ref 3.5–5.2)
PROT SERPL-MCNC: 6.5 G/DL (ref 6–8.5)
RBC # BLD AUTO: 3.1 10*6/MM3 (ref 4.14–5.8)
SMALL PLATELETS BLD QL SMEAR: NORMAL
SODIUM BLD-SCNC: 137 MMOL/L (ref 136–145)
WBC NRBC COR # BLD: 11.26 10*3/MM3 (ref 3.4–10.8)

## 2019-07-23 PROCEDURE — 99214 OFFICE O/P EST MOD 30 MIN: CPT | Performed by: INTERNAL MEDICINE

## 2019-07-23 PROCEDURE — 80053 COMPREHEN METABOLIC PANEL: CPT | Performed by: INTERNAL MEDICINE

## 2019-07-23 PROCEDURE — 96417 CHEMO IV INFUS EACH ADDL SEQ: CPT

## 2019-07-23 PROCEDURE — 85025 COMPLETE CBC W/AUTO DIFF WBC: CPT | Performed by: INTERNAL MEDICINE

## 2019-07-23 PROCEDURE — 25010000002 FOSAPREPITANT PER 1 MG: Performed by: INTERNAL MEDICINE

## 2019-07-23 PROCEDURE — 25010000002 GEMCITABINE HCL 2 GM/20ML SOLUTION 20 ML VIAL: Performed by: INTERNAL MEDICINE

## 2019-07-23 PROCEDURE — 96367 TX/PROPH/DG ADDL SEQ IV INF: CPT

## 2019-07-23 PROCEDURE — 25010000002 PACLITAXEL PROTEIN-BOUND PART PER 1 MG: Performed by: INTERNAL MEDICINE

## 2019-07-23 PROCEDURE — 96413 CHEMO IV INFUSION 1 HR: CPT

## 2019-07-23 PROCEDURE — 85007 BL SMEAR W/DIFF WBC COUNT: CPT | Performed by: INTERNAL MEDICINE

## 2019-07-23 PROCEDURE — 96375 TX/PRO/DX INJ NEW DRUG ADDON: CPT

## 2019-07-23 PROCEDURE — 25010000002 DEXAMETHASONE SODIUM PHOSPHATE 20 MG/5ML SOLUTION 5 ML VIAL: Performed by: INTERNAL MEDICINE

## 2019-07-23 RX ORDER — OXYCODONE HCL 10 MG/1
20 TABLET, FILM COATED, EXTENDED RELEASE ORAL EVERY 12 HOURS SCHEDULED
Qty: 120 TABLET | Refills: 0 | Status: SHIPPED | OUTPATIENT
Start: 2019-07-23 | End: 2019-09-26 | Stop reason: SDUPTHER

## 2019-07-23 RX ORDER — SODIUM CHLORIDE 0.9 % (FLUSH) 0.9 %
10 SYRINGE (ML) INJECTION AS NEEDED
Status: DISCONTINUED | OUTPATIENT
Start: 2019-07-23 | End: 2019-07-23 | Stop reason: HOSPADM

## 2019-07-23 RX ORDER — PACLITAXEL 100 MG/20ML
170 INJECTION, POWDER, LYOPHILIZED, FOR SUSPENSION INTRAVENOUS ONCE
Status: COMPLETED | OUTPATIENT
Start: 2019-07-23 | End: 2019-07-23

## 2019-07-23 RX ORDER — SODIUM CHLORIDE 0.9 % (FLUSH) 0.9 %
10 SYRINGE (ML) INJECTION AS NEEDED
Status: CANCELLED | OUTPATIENT
Start: 2019-07-30

## 2019-07-23 RX ORDER — AMOXICILLIN 250 MG
2 CAPSULE ORAL 2 TIMES DAILY
Qty: 120 TABLET | Refills: 5 | Status: SHIPPED | OUTPATIENT
Start: 2019-07-23

## 2019-07-23 RX ORDER — SODIUM CHLORIDE 9 MG/ML
250 INJECTION, SOLUTION INTRAVENOUS ONCE
Status: COMPLETED | OUTPATIENT
Start: 2019-07-23 | End: 2019-07-23

## 2019-07-23 RX ORDER — OXYCODONE HCL 10 MG/1
20 TABLET, FILM COATED, EXTENDED RELEASE ORAL EVERY 12 HOURS SCHEDULED
Qty: 120 TABLET | Refills: 0 | Status: SHIPPED | OUTPATIENT
Start: 2019-07-23 | End: 2019-07-23

## 2019-07-23 RX ADMIN — PACLITAXEL 170 MG: 100 INJECTION, POWDER, LYOPHILIZED, FOR SUSPENSION INTRAVENOUS at 15:02

## 2019-07-23 RX ADMIN — DEXAMETHASONE SODIUM PHOSPHATE 12 MG: 4 INJECTION, SOLUTION INTRAMUSCULAR; INTRAVENOUS at 14:06

## 2019-07-23 RX ADMIN — SODIUM CHLORIDE 250 ML: 9 INJECTION, SOLUTION INTRAVENOUS at 14:05

## 2019-07-23 RX ADMIN — SODIUM CHLORIDE 150 MG: 9 INJECTION, SOLUTION INTRAVENOUS at 14:24

## 2019-07-23 RX ADMIN — SODIUM CHLORIDE, PRESERVATIVE FREE 500 UNITS: 5 INJECTION INTRAVENOUS at 16:23

## 2019-07-23 RX ADMIN — SODIUM CHLORIDE, PRESERVATIVE FREE 10 ML: 5 INJECTION INTRAVENOUS at 16:23

## 2019-07-23 RX ADMIN — GEMCITABINE 1400 MG: 100 INJECTION, SOLUTION INTRAVENOUS at 15:36

## 2019-07-30 ENCOUNTER — INFUSION (OUTPATIENT)
Dept: ONCOLOGY | Facility: HOSPITAL | Age: 69
End: 2019-07-30
Attending: INTERNAL MEDICINE

## 2019-07-30 VITALS
BODY MASS INDEX: 21.27 KG/M2 | TEMPERATURE: 97.4 F | HEART RATE: 82 BPM | WEIGHT: 156.8 LBS | SYSTOLIC BLOOD PRESSURE: 133 MMHG | RESPIRATION RATE: 18 BRPM | DIASTOLIC BLOOD PRESSURE: 73 MMHG | OXYGEN SATURATION: 98 %

## 2019-07-30 DIAGNOSIS — E86.0 DEHYDRATION: Primary | ICD-10-CM

## 2019-07-30 DIAGNOSIS — C25.0 MALIGNANT NEOPLASM OF HEAD OF PANCREAS (HCC): ICD-10-CM

## 2019-07-30 LAB
ALBUMIN SERPL-MCNC: 2.84 G/DL (ref 3.5–5.2)
ALBUMIN/GLOB SERPL: 0.9 G/DL
ALP SERPL-CCNC: 184 U/L (ref 39–117)
ALT SERPL W P-5'-P-CCNC: 20 U/L (ref 1–41)
ANION GAP SERPL CALCULATED.3IONS-SCNC: 11.1 MMOL/L (ref 5–15)
AST SERPL-CCNC: 20 U/L (ref 1–40)
BASOPHILS # BLD AUTO: 0.01 10*3/MM3 (ref 0–0.2)
BASOPHILS NFR BLD AUTO: 0.1 % (ref 0–1.5)
BILIRUB SERPL-MCNC: 0.3 MG/DL (ref 0.2–1.2)
BUN BLD-MCNC: 21 MG/DL (ref 8–23)
BUN/CREAT SERPL: 19.1 (ref 7–25)
CALCIUM SPEC-SCNC: 8.7 MG/DL (ref 8.6–10.5)
CHLORIDE SERPL-SCNC: 103 MMOL/L (ref 98–107)
CO2 SERPL-SCNC: 23.9 MMOL/L (ref 22–29)
CREAT BLD-MCNC: 1.1 MG/DL (ref 0.76–1.27)
DEPRECATED RDW RBC AUTO: 53.3 FL (ref 37–54)
EOSINOPHIL # BLD AUTO: 0.1 10*3/MM3 (ref 0–0.4)
EOSINOPHIL NFR BLD AUTO: 1.4 % (ref 0.3–6.2)
ERYTHROCYTE [DISTWIDTH] IN BLOOD BY AUTOMATED COUNT: 16.7 % (ref 12.3–15.4)
GFR SERPL CREATININE-BSD FRML MDRD: 66 ML/MIN/1.73
GLOBULIN UR ELPH-MCNC: 3.1 GM/DL
GLUCOSE BLD-MCNC: 174 MG/DL (ref 65–99)
HCT VFR BLD AUTO: 28.3 % (ref 37.5–51)
HGB BLD-MCNC: 8.7 G/DL (ref 13–17.7)
IMM GRANULOCYTES # BLD AUTO: 0.12 10*3/MM3 (ref 0–0.05)
IMM GRANULOCYTES NFR BLD AUTO: 1.7 % (ref 0–0.5)
LYMPHOCYTES # BLD AUTO: 0.69 10*3/MM3 (ref 0.7–3.1)
LYMPHOCYTES NFR BLD AUTO: 9.7 % (ref 19.6–45.3)
MCH RBC QN AUTO: 28.1 PG (ref 26.6–33)
MCHC RBC AUTO-ENTMCNC: 30.7 G/DL (ref 31.5–35.7)
MCV RBC AUTO: 91.3 FL (ref 79–97)
MONOCYTES # BLD AUTO: 0.37 10*3/MM3 (ref 0.1–0.9)
MONOCYTES NFR BLD AUTO: 5.2 % (ref 5–12)
NEUTROPHILS # BLD AUTO: 5.86 10*3/MM3 (ref 1.7–7)
NEUTROPHILS NFR BLD AUTO: 81.9 % (ref 42.7–76)
PLATELET # BLD AUTO: 228 10*3/MM3 (ref 140–450)
PMV BLD AUTO: 9.6 FL (ref 6–12)
POTASSIUM BLD-SCNC: 4.2 MMOL/L (ref 3.5–5.2)
PROT SERPL-MCNC: 5.9 G/DL (ref 6–8.5)
RBC # BLD AUTO: 3.1 10*6/MM3 (ref 4.14–5.8)
SODIUM BLD-SCNC: 138 MMOL/L (ref 136–145)
WBC NRBC COR # BLD: 7.15 10*3/MM3 (ref 3.4–10.8)

## 2019-07-30 PROCEDURE — 96377 APPLICATON ON-BODY INJECTOR: CPT

## 2019-07-30 PROCEDURE — 96367 TX/PROPH/DG ADDL SEQ IV INF: CPT

## 2019-07-30 PROCEDURE — 96417 CHEMO IV INFUS EACH ADDL SEQ: CPT

## 2019-07-30 PROCEDURE — 96372 THER/PROPH/DIAG INJ SC/IM: CPT

## 2019-07-30 PROCEDURE — 80053 COMPREHEN METABOLIC PANEL: CPT

## 2019-07-30 PROCEDURE — 96375 TX/PRO/DX INJ NEW DRUG ADDON: CPT

## 2019-07-30 PROCEDURE — 96413 CHEMO IV INFUSION 1 HR: CPT

## 2019-07-30 PROCEDURE — 25010000002 PACLITAXEL PROTEIN-BOUND PART PER 1 MG: Performed by: INTERNAL MEDICINE

## 2019-07-30 PROCEDURE — 85025 COMPLETE CBC W/AUTO DIFF WBC: CPT

## 2019-07-30 PROCEDURE — 25010000002 PEGFILGRASTIM 6 MG/0.6ML PREFILLED SYRINGE KIT: Performed by: INTERNAL MEDICINE

## 2019-07-30 PROCEDURE — 96361 HYDRATE IV INFUSION ADD-ON: CPT

## 2019-07-30 PROCEDURE — 86301 IMMUNOASSAY TUMOR CA 19-9: CPT

## 2019-07-30 PROCEDURE — 25010000002 FOSAPREPITANT PER 1 MG: Performed by: INTERNAL MEDICINE

## 2019-07-30 PROCEDURE — 25010000002 DEXAMETHASONE SODIUM PHOSPHATE 20 MG/5ML SOLUTION 5 ML VIAL: Performed by: INTERNAL MEDICINE

## 2019-07-30 PROCEDURE — 25010000002 GEMCITABINE HCL 2 GM/20ML SOLUTION 20 ML VIAL: Performed by: INTERNAL MEDICINE

## 2019-07-30 RX ORDER — PACLITAXEL 100 MG/20ML
170 INJECTION, POWDER, LYOPHILIZED, FOR SUSPENSION INTRAVENOUS ONCE
Status: COMPLETED | OUTPATIENT
Start: 2019-07-30 | End: 2019-07-30

## 2019-07-30 RX ORDER — OXYCODONE HYDROCHLORIDE 10 MG/1
TABLET ORAL
Qty: 100 TABLET | Refills: 0 | Status: SHIPPED | OUTPATIENT
Start: 2019-07-30 | End: 2019-09-26 | Stop reason: SDUPTHER

## 2019-07-30 RX ORDER — SODIUM CHLORIDE 0.9 % (FLUSH) 0.9 %
10 SYRINGE (ML) INJECTION AS NEEDED
OUTPATIENT
Start: 2019-07-30

## 2019-07-30 RX ORDER — SODIUM CHLORIDE 9 MG/ML
INJECTION, SOLUTION INTRAVENOUS
Status: COMPLETED
Start: 2019-07-30 | End: 2019-07-30

## 2019-07-30 RX ORDER — HEPARIN SODIUM (PORCINE) LOCK FLUSH IV SOLN 100 UNIT/ML 100 UNIT/ML
300 SOLUTION INTRAVENOUS ONCE
OUTPATIENT
Start: 2019-07-30

## 2019-07-30 RX ORDER — HEPARIN SODIUM (PORCINE) LOCK FLUSH IV SOLN 100 UNIT/ML 100 UNIT/ML
500 SOLUTION INTRAVENOUS AS NEEDED
OUTPATIENT
Start: 2019-07-30

## 2019-07-30 RX ORDER — SODIUM CHLORIDE 0.9 % (FLUSH) 0.9 %
10 SYRINGE (ML) INJECTION AS NEEDED
Status: DISCONTINUED | OUTPATIENT
Start: 2019-07-30 | End: 2019-07-30 | Stop reason: HOSPADM

## 2019-07-30 RX ORDER — SODIUM CHLORIDE 9 MG/ML
250 INJECTION, SOLUTION INTRAVENOUS ONCE
Status: COMPLETED | OUTPATIENT
Start: 2019-07-30 | End: 2019-07-30

## 2019-07-30 RX ADMIN — PACLITAXEL 170 MG: 100 INJECTION, POWDER, LYOPHILIZED, FOR SUSPENSION INTRAVENOUS at 14:52

## 2019-07-30 RX ADMIN — SODIUM CHLORIDE, PRESERVATIVE FREE 500 UNITS: 5 INJECTION INTRAVENOUS at 16:25

## 2019-07-30 RX ADMIN — SODIUM CHLORIDE 1000 ML: 9 INJECTION, SOLUTION INTRAVENOUS at 13:16

## 2019-07-30 RX ADMIN — SODIUM CHLORIDE, PRESERVATIVE FREE 10 ML: 5 INJECTION INTRAVENOUS at 16:24

## 2019-07-30 RX ADMIN — DEXAMETHASONE SODIUM PHOSPHATE 12 MG: 4 INJECTION, SOLUTION INTRAMUSCULAR; INTRAVENOUS at 14:02

## 2019-07-30 RX ADMIN — GEMCITABINE 1400 MG: 100 INJECTION, SOLUTION INTRAVENOUS at 15:37

## 2019-07-30 RX ADMIN — PEGFILGRASTIM 6 MG: KIT SUBCUTANEOUS at 16:23

## 2019-07-30 RX ADMIN — SODIUM CHLORIDE 250 ML: 9 INJECTION, SOLUTION INTRAVENOUS at 14:16

## 2019-07-30 RX ADMIN — SODIUM CHLORIDE 150 MG: 9 INJECTION, SOLUTION INTRAVENOUS at 14:18

## 2019-07-31 LAB — CANCER AG19-9 SERPL-ACNC: 82.9 U/ML

## 2019-08-02 DIAGNOSIS — C25.0 MALIGNANT NEOPLASM OF HEAD OF PANCREAS (HCC): ICD-10-CM

## 2019-08-02 RX ORDER — PACLITAXEL 100 MG/20ML
87.5 INJECTION, POWDER, LYOPHILIZED, FOR SUSPENSION INTRAVENOUS ONCE
OUTPATIENT
Start: 2019-08-13

## 2019-08-02 RX ORDER — SODIUM CHLORIDE 9 MG/ML
250 INJECTION, SOLUTION INTRAVENOUS ONCE
OUTPATIENT
Start: 2019-08-20

## 2019-08-02 RX ORDER — PACLITAXEL 100 MG/20ML
87.5 INJECTION, POWDER, LYOPHILIZED, FOR SUSPENSION INTRAVENOUS ONCE
OUTPATIENT
Start: 2019-08-20

## 2019-08-02 RX ORDER — SODIUM CHLORIDE 9 MG/ML
250 INJECTION, SOLUTION INTRAVENOUS ONCE
OUTPATIENT
Start: 2019-08-13

## 2019-08-06 ENCOUNTER — APPOINTMENT (OUTPATIENT)
Dept: GENERAL RADIOLOGY | Facility: HOSPITAL | Age: 69
End: 2019-08-06

## 2019-08-06 ENCOUNTER — HOSPITAL ENCOUNTER (INPATIENT)
Facility: HOSPITAL | Age: 69
LOS: 30 days | Discharge: HOME | End: 2019-09-05
Attending: FAMILY MEDICINE | Admitting: INTERNAL MEDICINE

## 2019-08-06 ENCOUNTER — APPOINTMENT (OUTPATIENT)
Dept: CT IMAGING | Facility: HOSPITAL | Age: 69
End: 2019-08-06

## 2019-08-06 ENCOUNTER — TELEPHONE (OUTPATIENT)
Dept: ONCOLOGY | Facility: HOSPITAL | Age: 69
End: 2019-08-06

## 2019-08-06 DIAGNOSIS — A41.9 SEPSIS, DUE TO UNSPECIFIED ORGANISM: Primary | ICD-10-CM

## 2019-08-06 DIAGNOSIS — I48.91 ATRIAL FIBRILLATION WITH RVR (HCC): ICD-10-CM

## 2019-08-06 DIAGNOSIS — C25.9 MALIGNANT NEOPLASM OF PANCREAS, UNSPECIFIED LOCATION OF MALIGNANCY (HCC): ICD-10-CM

## 2019-08-06 DIAGNOSIS — Z79.01 CHRONIC ANTICOAGULATION: Chronic | ICD-10-CM

## 2019-08-06 DIAGNOSIS — E83.42 HYPOMAGNESEMIA: ICD-10-CM

## 2019-08-06 DIAGNOSIS — E87.1 DEHYDRATION WITH HYPONATREMIA: ICD-10-CM

## 2019-08-06 DIAGNOSIS — E86.0 DEHYDRATION WITH HYPONATREMIA: ICD-10-CM

## 2019-08-06 LAB
ALBUMIN SERPL-MCNC: 2.81 G/DL (ref 3.5–5.2)
ALBUMIN/GLOB SERPL: 0.9 G/DL
ALP SERPL-CCNC: 224 U/L (ref 39–117)
ALT SERPL W P-5'-P-CCNC: 15 U/L (ref 1–41)
AMYLASE SERPL-CCNC: 27 U/L (ref 28–100)
ANION GAP SERPL CALCULATED.3IONS-SCNC: 11.4 MMOL/L (ref 5–15)
ANISOCYTOSIS BLD QL: ABNORMAL
APTT PPP: 59.6 SECONDS (ref 23.8–36.1)
AST SERPL-CCNC: 14 U/L (ref 1–40)
BILIRUB SERPL-MCNC: 0.4 MG/DL (ref 0.2–1.2)
BILIRUB UR QL STRIP: NEGATIVE
BUN BLD-MCNC: 13 MG/DL (ref 8–23)
BUN/CREAT SERPL: 11.9 (ref 7–25)
CALCIUM SPEC-SCNC: 8.6 MG/DL (ref 8.6–10.5)
CHLORIDE SERPL-SCNC: 97 MMOL/L (ref 98–107)
CLARITY UR: CLEAR
CO2 SERPL-SCNC: 24.6 MMOL/L (ref 22–29)
COLOR UR: YELLOW
CREAT BLD-MCNC: 1.09 MG/DL (ref 0.76–1.27)
CRP SERPL-MCNC: 5.63 MG/DL (ref 0–0.5)
D-LACTATE SERPL-SCNC: 1.4 MMOL/L (ref 0.5–2)
DEPRECATED RDW RBC AUTO: 52.7 FL (ref 37–54)
ERYTHROCYTE [DISTWIDTH] IN BLOOD BY AUTOMATED COUNT: 16.7 % (ref 12.3–15.4)
GFR SERPL CREATININE-BSD FRML MDRD: 67 ML/MIN/1.73
GLOBULIN UR ELPH-MCNC: 3 GM/DL
GLUCOSE BLD-MCNC: 178 MG/DL (ref 65–99)
GLUCOSE BLDC GLUCOMTR-MCNC: 145 MG/DL (ref 70–130)
GLUCOSE BLDC GLUCOMTR-MCNC: 181 MG/DL (ref 70–130)
GLUCOSE UR STRIP-MCNC: NEGATIVE MG/DL
HBA1C MFR BLD: 7.2 % (ref 4.8–5.6)
HCT VFR BLD AUTO: 26.1 % (ref 37.5–51)
HGB BLD-MCNC: 8.2 G/DL (ref 13–17.7)
HGB UR QL STRIP.AUTO: NEGATIVE
INR PPP: 1.29 (ref 0.9–1.1)
KETONES UR QL STRIP: NEGATIVE
LEUKOCYTE ESTERASE UR QL STRIP.AUTO: NEGATIVE
LIPASE SERPL-CCNC: 4 U/L (ref 13–60)
LYMPHOCYTES # BLD MANUAL: 1.16 10*3/MM3 (ref 0.7–3.1)
LYMPHOCYTES NFR BLD MANUAL: 5 % (ref 5–12)
LYMPHOCYTES NFR BLD MANUAL: 6 % (ref 19.6–45.3)
MAGNESIUM SERPL-MCNC: 1.4 MG/DL (ref 1.6–2.4)
MCH RBC QN AUTO: 28.4 PG (ref 26.6–33)
MCHC RBC AUTO-ENTMCNC: 31.4 G/DL (ref 31.5–35.7)
MCV RBC AUTO: 90.3 FL (ref 79–97)
METAMYELOCYTES NFR BLD MANUAL: 4 % (ref 0–0)
MONOCYTES # BLD AUTO: 0.96 10*3/MM3 (ref 0.1–0.9)
MYELOCYTES NFR BLD MANUAL: 2 % (ref 0–0)
NEUTROPHILS # BLD AUTO: 16 10*3/MM3 (ref 1.7–7)
NEUTROPHILS NFR BLD MANUAL: 72 % (ref 42.7–76)
NEUTS BAND NFR BLD MANUAL: 11 % (ref 0–5)
NITRITE UR QL STRIP: NEGATIVE
PH UR STRIP.AUTO: 6.5 [PH] (ref 5–8)
PLAT MORPH BLD: NORMAL
PLATELET # BLD AUTO: 131 10*3/MM3 (ref 140–450)
PMV BLD AUTO: 10.2 FL (ref 6–12)
POIKILOCYTOSIS BLD QL SMEAR: ABNORMAL
POTASSIUM BLD-SCNC: 4.2 MMOL/L (ref 3.5–5.2)
PROT SERPL-MCNC: 5.8 G/DL (ref 6–8.5)
PROT UR QL STRIP: NEGATIVE
PROTHROMBIN TIME: 16.8 SECONDS (ref 11–15.4)
RBC # BLD AUTO: 2.89 10*6/MM3 (ref 4.14–5.8)
SCAN SLIDE: NORMAL
SODIUM BLD-SCNC: 133 MMOL/L (ref 136–145)
SP GR UR STRIP: 1.01 (ref 1–1.03)
TROPONIN T SERPL-MCNC: <0.01 NG/ML (ref 0–0.03)
TROPONIN T SERPL-MCNC: <0.01 NG/ML (ref 0–0.03)
UROBILINOGEN UR QL STRIP: NORMAL
WBC NRBC COR # BLD: 19.28 10*3/MM3 (ref 3.4–10.8)

## 2019-08-06 PROCEDURE — 71045 X-RAY EXAM CHEST 1 VIEW: CPT | Performed by: RADIOLOGY

## 2019-08-06 PROCEDURE — 85025 COMPLETE CBC W/AUTO DIFF WBC: CPT | Performed by: PHYSICIAN ASSISTANT

## 2019-08-06 PROCEDURE — 25010000002 ERTAPENEM 1 GM/100ML SOLUTION: Performed by: INTERNAL MEDICINE

## 2019-08-06 PROCEDURE — 86677 HELICOBACTER PYLORI ANTIBODY: CPT | Performed by: NURSE PRACTITIONER

## 2019-08-06 PROCEDURE — 83036 HEMOGLOBIN GLYCOSYLATED A1C: CPT | Performed by: NURSE PRACTITIONER

## 2019-08-06 PROCEDURE — 86140 C-REACTIVE PROTEIN: CPT | Performed by: PHYSICIAN ASSISTANT

## 2019-08-06 PROCEDURE — 85007 BL SMEAR W/DIFF WBC COUNT: CPT | Performed by: PHYSICIAN ASSISTANT

## 2019-08-06 PROCEDURE — 25010000002 PIPERACILLIN-TAZOBACTAM: Performed by: PHYSICIAN ASSISTANT

## 2019-08-06 PROCEDURE — 82962 GLUCOSE BLOOD TEST: CPT

## 2019-08-06 PROCEDURE — 25010000002 VANCOMYCIN 5 G RECONSTITUTED SOLUTION 5,000 MG VIAL: Performed by: PHYSICIAN ASSISTANT

## 2019-08-06 PROCEDURE — 84484 ASSAY OF TROPONIN QUANT: CPT | Performed by: NURSE PRACTITIONER

## 2019-08-06 PROCEDURE — 25010000002 ONDANSETRON PER 1 MG: Performed by: PHYSICIAN ASSISTANT

## 2019-08-06 PROCEDURE — 80053 COMPREHEN METABOLIC PANEL: CPT | Performed by: PHYSICIAN ASSISTANT

## 2019-08-06 PROCEDURE — 71250 CT THORAX DX C-: CPT | Performed by: RADIOLOGY

## 2019-08-06 PROCEDURE — 81003 URINALYSIS AUTO W/O SCOPE: CPT | Performed by: PHYSICIAN ASSISTANT

## 2019-08-06 PROCEDURE — 25010000002 MAGNESIUM SULFATE IN D5W 1G/100ML (PREMIX) 1-5 GM/100ML-% SOLUTION: Performed by: PHYSICIAN ASSISTANT

## 2019-08-06 PROCEDURE — 71250 CT THORAX DX C-: CPT

## 2019-08-06 PROCEDURE — 83605 ASSAY OF LACTIC ACID: CPT | Performed by: PHYSICIAN ASSISTANT

## 2019-08-06 PROCEDURE — 87086 URINE CULTURE/COLONY COUNT: CPT | Performed by: PHYSICIAN ASSISTANT

## 2019-08-06 PROCEDURE — 99223 1ST HOSP IP/OBS HIGH 75: CPT | Performed by: HOSPITALIST

## 2019-08-06 PROCEDURE — 25010000002 MAGNESIUM SULFATE 2 GM/50ML SOLUTION: Performed by: INTERNAL MEDICINE

## 2019-08-06 PROCEDURE — 93005 ELECTROCARDIOGRAM TRACING: CPT | Performed by: FAMILY MEDICINE

## 2019-08-06 PROCEDURE — 25010000002 MORPHINE PER 10 MG: Performed by: HOSPITALIST

## 2019-08-06 PROCEDURE — 87040 BLOOD CULTURE FOR BACTERIA: CPT | Performed by: PHYSICIAN ASSISTANT

## 2019-08-06 PROCEDURE — 85610 PROTHROMBIN TIME: CPT | Performed by: PHYSICIAN ASSISTANT

## 2019-08-06 PROCEDURE — 71045 X-RAY EXAM CHEST 1 VIEW: CPT

## 2019-08-06 PROCEDURE — 74176 CT ABD & PELVIS W/O CONTRAST: CPT

## 2019-08-06 PROCEDURE — 84484 ASSAY OF TROPONIN QUANT: CPT | Performed by: PHYSICIAN ASSISTANT

## 2019-08-06 PROCEDURE — 99284 EMERGENCY DEPT VISIT MOD MDM: CPT

## 2019-08-06 PROCEDURE — 25010000002 MORPHINE PER 10 MG: Performed by: FAMILY MEDICINE

## 2019-08-06 PROCEDURE — 25010000002 ONDANSETRON PER 1 MG: Performed by: FAMILY MEDICINE

## 2019-08-06 PROCEDURE — 83735 ASSAY OF MAGNESIUM: CPT | Performed by: PHYSICIAN ASSISTANT

## 2019-08-06 PROCEDURE — 74176 CT ABD & PELVIS W/O CONTRAST: CPT | Performed by: RADIOLOGY

## 2019-08-06 PROCEDURE — 82150 ASSAY OF AMYLASE: CPT | Performed by: PHYSICIAN ASSISTANT

## 2019-08-06 PROCEDURE — 83690 ASSAY OF LIPASE: CPT | Performed by: PHYSICIAN ASSISTANT

## 2019-08-06 PROCEDURE — 85730 THROMBOPLASTIN TIME PARTIAL: CPT | Performed by: PHYSICIAN ASSISTANT

## 2019-08-06 RX ORDER — MAGNESIUM SULFATE HEPTAHYDRATE 40 MG/ML
2 INJECTION, SOLUTION INTRAVENOUS
Status: COMPLETED | OUTPATIENT
Start: 2019-08-06 | End: 2019-08-07

## 2019-08-06 RX ORDER — LORATADINE 10 MG/1
10 TABLET ORAL 2 TIMES DAILY
Status: ON HOLD | COMMUNITY
End: 2019-09-05 | Stop reason: SDUPTHER

## 2019-08-06 RX ORDER — SODIUM CHLORIDE 9 MG/ML
125 INJECTION, SOLUTION INTRAVENOUS CONTINUOUS
Status: DISCONTINUED | OUTPATIENT
Start: 2019-08-06 | End: 2019-08-06

## 2019-08-06 RX ORDER — PANTOPRAZOLE SODIUM 40 MG/10ML
40 INJECTION, POWDER, LYOPHILIZED, FOR SOLUTION INTRAVENOUS
Status: DISCONTINUED | OUTPATIENT
Start: 2019-08-06 | End: 2019-08-06

## 2019-08-06 RX ORDER — SODIUM CHLORIDE 9 MG/ML
100 INJECTION, SOLUTION INTRAVENOUS CONTINUOUS
Status: DISCONTINUED | OUTPATIENT
Start: 2019-08-06 | End: 2019-08-11

## 2019-08-06 RX ORDER — SODIUM CHLORIDE 0.9 % (FLUSH) 0.9 %
3-10 SYRINGE (ML) INJECTION AS NEEDED
Status: DISCONTINUED | OUTPATIENT
Start: 2019-08-06 | End: 2019-08-19 | Stop reason: SDUPTHER

## 2019-08-06 RX ORDER — ALBUTEROL SULFATE 2.5 MG/3ML
2.5 SOLUTION RESPIRATORY (INHALATION) EVERY 6 HOURS PRN
Status: CANCELLED | OUTPATIENT
Start: 2019-08-06

## 2019-08-06 RX ORDER — ERTAPENEM 1 G/1
1 INJECTION, POWDER, LYOPHILIZED, FOR SOLUTION INTRAMUSCULAR; INTRAVENOUS NIGHTLY
Status: DISCONTINUED | OUTPATIENT
Start: 2019-08-06 | End: 2019-08-06 | Stop reason: ALTCHOICE

## 2019-08-06 RX ORDER — SODIUM CHLORIDE 9 MG/ML
INJECTION, SOLUTION INTRAVENOUS
Status: COMPLETED
Start: 2019-08-06 | End: 2019-08-06

## 2019-08-06 RX ORDER — ONDANSETRON 2 MG/ML
INJECTION INTRAMUSCULAR; INTRAVENOUS
Status: DISPENSED
Start: 2019-08-06 | End: 2019-08-07

## 2019-08-06 RX ORDER — ALLOPURINOL 100 MG/1
100 TABLET ORAL 2 TIMES DAILY
Status: DISCONTINUED | OUTPATIENT
Start: 2019-08-06 | End: 2019-09-05 | Stop reason: HOSPADM

## 2019-08-06 RX ORDER — OMEGA-3S/DHA/EPA/FISH OIL/D3 300MG-1000
1000 CAPSULE ORAL DAILY
Status: DISCONTINUED | OUTPATIENT
Start: 2019-08-07 | End: 2019-09-05 | Stop reason: HOSPADM

## 2019-08-06 RX ORDER — MONTELUKAST SODIUM 10 MG/1
10 TABLET ORAL NIGHTLY
Status: DISCONTINUED | OUTPATIENT
Start: 2019-08-06 | End: 2019-09-05 | Stop reason: HOSPADM

## 2019-08-06 RX ORDER — ONDANSETRON 4 MG/1
8 TABLET, FILM COATED ORAL EVERY 8 HOURS PRN
Status: DISCONTINUED | OUTPATIENT
Start: 2019-08-06 | End: 2019-08-09

## 2019-08-06 RX ORDER — SODIUM CHLORIDE 0.9 % (FLUSH) 0.9 %
10 SYRINGE (ML) INJECTION AS NEEDED
Status: DISCONTINUED | OUTPATIENT
Start: 2019-08-06 | End: 2019-08-06

## 2019-08-06 RX ORDER — BUDESONIDE 0.5 MG/2ML
0.5 INHALANT ORAL
Status: DISCONTINUED | OUTPATIENT
Start: 2019-08-06 | End: 2019-09-05 | Stop reason: HOSPADM

## 2019-08-06 RX ORDER — MORPHINE SULFATE 2 MG/ML
2 INJECTION, SOLUTION INTRAMUSCULAR; INTRAVENOUS EVERY 4 HOURS PRN
Status: DISCONTINUED | OUTPATIENT
Start: 2019-08-06 | End: 2019-08-09

## 2019-08-06 RX ORDER — ALBUTEROL SULFATE 2.5 MG/3ML
2.5 SOLUTION RESPIRATORY (INHALATION) EVERY 4 HOURS PRN
Status: DISCONTINUED | OUTPATIENT
Start: 2019-08-06 | End: 2019-08-14

## 2019-08-06 RX ORDER — PROCHLORPERAZINE MALEATE 10 MG
10 TABLET ORAL EVERY 6 HOURS PRN
Status: DISCONTINUED | OUTPATIENT
Start: 2019-08-06 | End: 2019-09-03

## 2019-08-06 RX ORDER — MAGNESIUM SULFATE 1 G/100ML
1 INJECTION INTRAVENOUS ONCE
Status: COMPLETED | OUTPATIENT
Start: 2019-08-06 | End: 2019-08-06

## 2019-08-06 RX ORDER — ONDANSETRON 2 MG/ML
4 INJECTION INTRAMUSCULAR; INTRAVENOUS ONCE
Status: COMPLETED | OUTPATIENT
Start: 2019-08-06 | End: 2019-08-06

## 2019-08-06 RX ORDER — OXYCODONE HYDROCHLORIDE 5 MG/1
10 TABLET ORAL EVERY 6 HOURS PRN
Status: DISCONTINUED | OUTPATIENT
Start: 2019-08-06 | End: 2019-08-28

## 2019-08-06 RX ORDER — PANTOPRAZOLE SODIUM 40 MG/1
40 TABLET, DELAYED RELEASE ORAL EVERY MORNING
Status: DISCONTINUED | OUTPATIENT
Start: 2019-08-07 | End: 2019-09-05 | Stop reason: HOSPADM

## 2019-08-06 RX ORDER — DEXTROSE MONOHYDRATE 25 G/50ML
25 INJECTION, SOLUTION INTRAVENOUS
Status: DISCONTINUED | OUTPATIENT
Start: 2019-08-06 | End: 2019-09-05 | Stop reason: HOSPADM

## 2019-08-06 RX ORDER — FLECAINIDE ACETATE 50 MG/1
50 TABLET ORAL EVERY 12 HOURS
Status: DISCONTINUED | OUTPATIENT
Start: 2019-08-06 | End: 2019-09-05 | Stop reason: HOSPADM

## 2019-08-06 RX ORDER — MAGNESIUM SULFATE HEPTAHYDRATE 40 MG/ML
2 INJECTION, SOLUTION INTRAVENOUS AS NEEDED
Status: DISCONTINUED | OUTPATIENT
Start: 2019-08-06 | End: 2019-09-05 | Stop reason: HOSPADM

## 2019-08-06 RX ORDER — NITROGLYCERIN 0.4 MG/1
0.4 TABLET SUBLINGUAL AS NEEDED
Status: DISCONTINUED | OUTPATIENT
Start: 2019-08-06 | End: 2019-09-05 | Stop reason: HOSPADM

## 2019-08-06 RX ORDER — NICOTINE POLACRILEX 4 MG
15 LOZENGE BUCCAL
Status: DISCONTINUED | OUTPATIENT
Start: 2019-08-06 | End: 2019-09-05 | Stop reason: HOSPADM

## 2019-08-06 RX ORDER — SODIUM CHLORIDE 0.9 % (FLUSH) 0.9 %
10 SYRINGE (ML) INJECTION AS NEEDED
Status: DISCONTINUED | OUTPATIENT
Start: 2019-08-06 | End: 2019-08-19 | Stop reason: SDUPTHER

## 2019-08-06 RX ORDER — MAGNESIUM SULFATE HEPTAHYDRATE 40 MG/ML
4 INJECTION, SOLUTION INTRAVENOUS AS NEEDED
Status: DISCONTINUED | OUTPATIENT
Start: 2019-08-06 | End: 2019-09-05 | Stop reason: HOSPADM

## 2019-08-06 RX ORDER — FLUOXETINE HYDROCHLORIDE 20 MG/1
40 CAPSULE ORAL EVERY MORNING
Status: DISCONTINUED | OUTPATIENT
Start: 2019-08-07 | End: 2019-09-05 | Stop reason: HOSPADM

## 2019-08-06 RX ORDER — MORPHINE SULFATE 2 MG/ML
2 INJECTION, SOLUTION INTRAMUSCULAR; INTRAVENOUS ONCE
Status: COMPLETED | OUTPATIENT
Start: 2019-08-06 | End: 2019-08-06

## 2019-08-06 RX ORDER — SODIUM CHLORIDE 0.9 % (FLUSH) 0.9 %
3 SYRINGE (ML) INJECTION EVERY 12 HOURS SCHEDULED
Status: DISCONTINUED | OUTPATIENT
Start: 2019-08-06 | End: 2019-08-19 | Stop reason: SDUPTHER

## 2019-08-06 RX ORDER — AMOXICILLIN 250 MG
2 CAPSULE ORAL 2 TIMES DAILY
Status: DISCONTINUED | OUTPATIENT
Start: 2019-08-06 | End: 2019-08-14

## 2019-08-06 RX ORDER — OXYCODONE HCL 20 MG/1
20 TABLET, FILM COATED, EXTENDED RELEASE ORAL EVERY 12 HOURS SCHEDULED
Status: DISCONTINUED | OUTPATIENT
Start: 2019-08-06 | End: 2019-09-05 | Stop reason: HOSPADM

## 2019-08-06 RX ORDER — CETIRIZINE HYDROCHLORIDE 10 MG/1
5 TABLET ORAL DAILY
Status: DISCONTINUED | OUTPATIENT
Start: 2019-08-07 | End: 2019-09-05 | Stop reason: HOSPADM

## 2019-08-06 RX ORDER — L.ACID,PARA/B.BIFIDUM/S.THERM 8B CELL
1 CAPSULE ORAL DAILY
Status: DISCONTINUED | OUTPATIENT
Start: 2019-08-07 | End: 2019-08-07 | Stop reason: ALTCHOICE

## 2019-08-06 RX ADMIN — SENNOSIDES, DOCUSATE SODIUM 2 TABLET: 50; 8.6 TABLET, FILM COATED ORAL at 21:44

## 2019-08-06 RX ADMIN — MONTELUKAST SODIUM 10 MG: 10 TABLET, COATED ORAL at 21:44

## 2019-08-06 RX ADMIN — PANTOPRAZOLE SODIUM 40 MG: 40 INJECTION, POWDER, FOR SOLUTION INTRAVENOUS at 20:23

## 2019-08-06 RX ADMIN — ALLOPURINOL 100 MG: 100 TABLET ORAL at 21:44

## 2019-08-06 RX ADMIN — PIPERACILLIN AND TAZOBACTAM 4.5 G: 4; .5 INJECTION, POWDER, LYOPHILIZED, FOR SOLUTION INTRAVENOUS; PARENTERAL at 12:19

## 2019-08-06 RX ADMIN — MORPHINE SULFATE 2 MG: 2 INJECTION, SOLUTION INTRAMUSCULAR; INTRAVENOUS at 14:22

## 2019-08-06 RX ADMIN — SODIUM CHLORIDE, PRESERVATIVE FREE 3 ML: 5 INJECTION INTRAVENOUS at 21:45

## 2019-08-06 RX ADMIN — SODIUM CHLORIDE 125 ML/HR: 9 INJECTION, SOLUTION INTRAVENOUS at 18:45

## 2019-08-06 RX ADMIN — APIXABAN 5 MG: 5 TABLET, FILM COATED ORAL at 21:45

## 2019-08-06 RX ADMIN — MORPHINE SULFATE 2 MG: 2 INJECTION, SOLUTION INTRAMUSCULAR; INTRAVENOUS at 21:35

## 2019-08-06 RX ADMIN — ERTAPENEM SODIUM 1 G: 1 INJECTION, POWDER, LYOPHILIZED, FOR SOLUTION INTRAMUSCULAR; INTRAVENOUS at 21:40

## 2019-08-06 RX ADMIN — MAGNESIUM SULFATE IN DEXTROSE 1 G: 10 INJECTION, SOLUTION INTRAVENOUS at 14:32

## 2019-08-06 RX ADMIN — ONDANSETRON 4 MG: 2 INJECTION, SOLUTION INTRAMUSCULAR; INTRAVENOUS at 17:28

## 2019-08-06 RX ADMIN — SODIUM CHLORIDE 2190 ML: 9 INJECTION, SOLUTION INTRAVENOUS at 12:08

## 2019-08-06 RX ADMIN — ONDANSETRON 4 MG: 2 INJECTION, SOLUTION INTRAMUSCULAR; INTRAVENOUS at 14:21

## 2019-08-06 RX ADMIN — VANCOMYCIN HYDROCHLORIDE 1500 MG: 5 INJECTION, POWDER, LYOPHILIZED, FOR SOLUTION INTRAVENOUS at 15:28

## 2019-08-06 RX ADMIN — SODIUM CHLORIDE 125 ML/HR: 9 INJECTION, SOLUTION INTRAVENOUS at 16:35

## 2019-08-06 RX ADMIN — FLECAINIDE ACETATE 50 MG: 50 TABLET ORAL at 21:44

## 2019-08-06 RX ADMIN — MAGNESIUM SULFATE IN WATER 2 G: 40 INJECTION, SOLUTION INTRAVENOUS at 21:41

## 2019-08-06 NOTE — TELEPHONE ENCOUNTER
----- Message from Nneka Damon sent at 8/6/2019  8:53 AM EDT -----  Regarding: FEVER  Contact: [phone]  Mrs. Phillips called stating that Todd has been running a fever on and off since Saturday. This morning it is up to 101. Please call Mrs. Phillips back. Thank you

## 2019-08-06 NOTE — TELEPHONE ENCOUNTER
I have discussed this with Dr. Encarnacion who wanted pt to come in to be seen in an old style acute visit with labs and fluids. When I called Nneka Damon to determine a time for the patient to come in, she said that the wife had called back to tell her she was taking the patient to the ER. Dr. Encarnacion made aware.

## 2019-08-07 ENCOUNTER — APPOINTMENT (OUTPATIENT)
Dept: CARDIOLOGY | Facility: HOSPITAL | Age: 69
End: 2019-08-07

## 2019-08-07 LAB
ALBUMIN SERPL-MCNC: 2.34 G/DL (ref 3.5–5.2)
ALBUMIN/GLOB SERPL: 0.8 G/DL
ALP SERPL-CCNC: 194 U/L (ref 39–117)
ALT SERPL W P-5'-P-CCNC: 11 U/L (ref 1–41)
ANION GAP SERPL CALCULATED.3IONS-SCNC: 10 MMOL/L (ref 5–15)
ANISOCYTOSIS BLD QL: ABNORMAL
AST SERPL-CCNC: 16 U/L (ref 1–40)
BACTERIA SPEC AEROBE CULT: NO GROWTH
BH CV ECHO MEAS - BSA(HAYCOCK): 1.9 M^2
BH CV ECHO MEAS - BSA: 1.9 M^2
BH CV ECHO MEAS - BZI_BMI: 21.3 KILOGRAMS/M^2
BH CV ECHO MEAS - BZI_METRIC_HEIGHT: 182.9 CM
BH CV ECHO MEAS - BZI_METRIC_WEIGHT: 71.2 KG
BILIRUB SERPL-MCNC: 0.3 MG/DL (ref 0.2–1.2)
BUN BLD-MCNC: 11 MG/DL (ref 8–23)
BUN/CREAT SERPL: 9.7 (ref 7–25)
CALCIUM SPEC-SCNC: 8.2 MG/DL (ref 8.6–10.5)
CHLORIDE SERPL-SCNC: 100 MMOL/L (ref 98–107)
CO2 SERPL-SCNC: 22 MMOL/L (ref 22–29)
CREAT BLD-MCNC: 1.13 MG/DL (ref 0.76–1.27)
CRP SERPL-MCNC: 5.69 MG/DL (ref 0–0.5)
DEPRECATED RDW RBC AUTO: 55.8 FL (ref 37–54)
ERYTHROCYTE [DISTWIDTH] IN BLOOD BY AUTOMATED COUNT: 17.3 % (ref 12.3–15.4)
GFR SERPL CREATININE-BSD FRML MDRD: 64 ML/MIN/1.73
GLOBULIN UR ELPH-MCNC: 2.9 GM/DL
GLUCOSE BLD-MCNC: 122 MG/DL (ref 65–99)
GLUCOSE BLDC GLUCOMTR-MCNC: 126 MG/DL (ref 70–130)
GLUCOSE BLDC GLUCOMTR-MCNC: 138 MG/DL (ref 70–130)
GLUCOSE BLDC GLUCOMTR-MCNC: 146 MG/DL (ref 70–130)
GLUCOSE BLDC GLUCOMTR-MCNC: 147 MG/DL (ref 70–130)
HCT VFR BLD AUTO: 23.9 % (ref 37.5–51)
HGB BLD-MCNC: 7.6 G/DL (ref 13–17.7)
HYPOCHROMIA BLD QL: ABNORMAL
L PNEUMO1 AG UR QL IA: NEGATIVE
LYMPHOCYTES # BLD MANUAL: 2.08 10*3/MM3 (ref 0.7–3.1)
LYMPHOCYTES NFR BLD MANUAL: 1 % (ref 5–12)
LYMPHOCYTES NFR BLD MANUAL: 10 % (ref 19.6–45.3)
M PNEUMO IGM SER QL: NEGATIVE
MAGNESIUM SERPL-MCNC: 2.2 MG/DL (ref 1.6–2.4)
MAXIMAL PREDICTED HEART RATE: 149 BPM
MCH RBC QN AUTO: 28.6 PG (ref 26.6–33)
MCHC RBC AUTO-ENTMCNC: 31.8 G/DL (ref 31.5–35.7)
MCV RBC AUTO: 89.8 FL (ref 79–97)
MONOCYTES # BLD AUTO: 0.21 10*3/MM3 (ref 0.1–0.9)
NEUTROPHILS # BLD AUTO: 18.55 10*3/MM3 (ref 1.7–7)
NEUTROPHILS NFR BLD MANUAL: 86 % (ref 42.7–76)
NEUTS BAND NFR BLD MANUAL: 3 % (ref 0–5)
PLAT MORPH BLD: NORMAL
PLATELET # BLD AUTO: 120 10*3/MM3 (ref 140–450)
PMV BLD AUTO: 10.3 FL (ref 6–12)
POTASSIUM BLD-SCNC: 4 MMOL/L (ref 3.5–5.2)
PROT SERPL-MCNC: 5.2 G/DL (ref 6–8.5)
RBC # BLD AUTO: 2.66 10*6/MM3 (ref 4.14–5.8)
SCAN SLIDE: NORMAL
SODIUM BLD-SCNC: 132 MMOL/L (ref 136–145)
STRESS TARGET HR: 127 BPM
TROPONIN T SERPL-MCNC: <0.01 NG/ML (ref 0–0.03)
TROPONIN T SERPL-MCNC: <0.01 NG/ML (ref 0–0.03)
WBC NRBC COR # BLD: 20.84 10*3/MM3 (ref 3.4–10.8)

## 2019-08-07 PROCEDURE — 84484 ASSAY OF TROPONIN QUANT: CPT | Performed by: INTERNAL MEDICINE

## 2019-08-07 PROCEDURE — 25010000002 ERTAPENEM 1 GM/100ML SOLUTION: Performed by: INTERNAL MEDICINE

## 2019-08-07 PROCEDURE — 25010000002 MORPHINE PER 10 MG: Performed by: HOSPITALIST

## 2019-08-07 PROCEDURE — 82962 GLUCOSE BLOOD TEST: CPT

## 2019-08-07 PROCEDURE — 86140 C-REACTIVE PROTEIN: CPT | Performed by: INTERNAL MEDICINE

## 2019-08-07 PROCEDURE — 87899 AGENT NOS ASSAY W/OPTIC: CPT | Performed by: NURSE PRACTITIONER

## 2019-08-07 PROCEDURE — 86701 HIV-1ANTIBODY: CPT | Performed by: NURSE PRACTITIONER

## 2019-08-07 PROCEDURE — 94640 AIRWAY INHALATION TREATMENT: CPT

## 2019-08-07 PROCEDURE — 25010000002 VANCOMYCIN 5 G RECONSTITUTED SOLUTION 5,000 MG VIAL: Performed by: INTERNAL MEDICINE

## 2019-08-07 PROCEDURE — 94799 UNLISTED PULMONARY SVC/PX: CPT

## 2019-08-07 PROCEDURE — 99222 1ST HOSP IP/OBS MODERATE 55: CPT | Performed by: NURSE PRACTITIONER

## 2019-08-07 PROCEDURE — 83735 ASSAY OF MAGNESIUM: CPT | Performed by: INTERNAL MEDICINE

## 2019-08-07 PROCEDURE — 80053 COMPREHEN METABOLIC PANEL: CPT | Performed by: INTERNAL MEDICINE

## 2019-08-07 PROCEDURE — 85025 COMPLETE CBC W/AUTO DIFF WBC: CPT | Performed by: INTERNAL MEDICINE

## 2019-08-07 PROCEDURE — 99232 SBSQ HOSP IP/OBS MODERATE 35: CPT | Performed by: INTERNAL MEDICINE

## 2019-08-07 PROCEDURE — 86738 MYCOPLASMA ANTIBODY: CPT | Performed by: NURSE PRACTITIONER

## 2019-08-07 PROCEDURE — 93306 TTE W/DOPPLER COMPLETE: CPT

## 2019-08-07 PROCEDURE — 25010000002 MAGNESIUM SULFATE 2 GM/50ML SOLUTION: Performed by: INTERNAL MEDICINE

## 2019-08-07 PROCEDURE — 86702 HIV-2 ANTIBODY: CPT | Performed by: NURSE PRACTITIONER

## 2019-08-07 PROCEDURE — 85007 BL SMEAR W/DIFF WBC COUNT: CPT | Performed by: INTERNAL MEDICINE

## 2019-08-07 RX ADMIN — ALLOPURINOL 100 MG: 100 TABLET ORAL at 21:01

## 2019-08-07 RX ADMIN — BUDESONIDE 0.5 MG: 0.5 INHALANT RESPIRATORY (INHALATION) at 00:50

## 2019-08-07 RX ADMIN — ERTAPENEM SODIUM 1 G: 1 INJECTION, POWDER, LYOPHILIZED, FOR SOLUTION INTRAMUSCULAR; INTRAVENOUS at 21:00

## 2019-08-07 RX ADMIN — SODIUM CHLORIDE, PRESERVATIVE FREE 3 ML: 5 INJECTION INTRAVENOUS at 09:26

## 2019-08-07 RX ADMIN — Medication 1 CAPSULE: at 09:25

## 2019-08-07 RX ADMIN — ONDANSETRON 8 MG: 4 TABLET, FILM COATED ORAL at 17:05

## 2019-08-07 RX ADMIN — SODIUM CHLORIDE, PRESERVATIVE FREE 10 ML: 5 INJECTION INTRAVENOUS at 21:03

## 2019-08-07 RX ADMIN — SENNOSIDES, DOCUSATE SODIUM 2 TABLET: 50; 8.6 TABLET, FILM COATED ORAL at 21:01

## 2019-08-07 RX ADMIN — CETIRIZINE HYDROCHLORIDE 5 MG: 10 TABLET, FILM COATED ORAL at 09:26

## 2019-08-07 RX ADMIN — CHOLECALCIFEROL TAB 10 MCG (400 UNIT) 1000 UNITS: 10 TAB at 09:25

## 2019-08-07 RX ADMIN — OXYCODONE HYDROCHLORIDE 10 MG: 5 TABLET ORAL at 17:05

## 2019-08-07 RX ADMIN — OXYCODONE HYDROCHLORIDE 20 MG: 20 TABLET, FILM COATED, EXTENDED RELEASE ORAL at 21:02

## 2019-08-07 RX ADMIN — APIXABAN 5 MG: 5 TABLET, FILM COATED ORAL at 09:25

## 2019-08-07 RX ADMIN — PANTOPRAZOLE SODIUM 40 MG: 40 TABLET, DELAYED RELEASE ORAL at 09:25

## 2019-08-07 RX ADMIN — FLUOXETINE 40 MG: 20 CAPSULE ORAL at 09:25

## 2019-08-07 RX ADMIN — BUDESONIDE 0.5 MG: 0.5 INHALANT RESPIRATORY (INHALATION) at 06:42

## 2019-08-07 RX ADMIN — ALLOPURINOL 100 MG: 100 TABLET ORAL at 09:25

## 2019-08-07 RX ADMIN — SENNOSIDES, DOCUSATE SODIUM 2 TABLET: 50; 8.6 TABLET, FILM COATED ORAL at 09:25

## 2019-08-07 RX ADMIN — VANCOMYCIN HYDROCHLORIDE 750 MG: 5 INJECTION, POWDER, LYOPHILIZED, FOR SOLUTION INTRAVENOUS at 04:16

## 2019-08-07 RX ADMIN — MONTELUKAST SODIUM 10 MG: 10 TABLET, COATED ORAL at 21:01

## 2019-08-07 RX ADMIN — MAGNESIUM SULFATE IN WATER 2 G: 40 INJECTION, SOLUTION INTRAVENOUS at 02:10

## 2019-08-07 RX ADMIN — OXYCODONE HYDROCHLORIDE 20 MG: 20 TABLET, FILM COATED, EXTENDED RELEASE ORAL at 00:20

## 2019-08-07 RX ADMIN — MAGNESIUM SULFATE IN WATER 2 G: 40 INJECTION, SOLUTION INTRAVENOUS at 00:19

## 2019-08-07 RX ADMIN — DOXYCYCLINE 100 MG: 100 INJECTION, POWDER, LYOPHILIZED, FOR SOLUTION INTRAVENOUS at 14:09

## 2019-08-07 RX ADMIN — FLECAINIDE ACETATE 50 MG: 50 TABLET ORAL at 09:26

## 2019-08-07 RX ADMIN — APIXABAN 5 MG: 5 TABLET, FILM COATED ORAL at 21:01

## 2019-08-07 RX ADMIN — SODIUM CHLORIDE 100 ML/HR: 9 INJECTION, SOLUTION INTRAVENOUS at 14:14

## 2019-08-07 RX ADMIN — MORPHINE SULFATE 2 MG: 2 INJECTION, SOLUTION INTRAMUSCULAR; INTRAVENOUS at 17:38

## 2019-08-07 RX ADMIN — BUDESONIDE 0.5 MG: 0.5 INHALANT RESPIRATORY (INHALATION) at 19:32

## 2019-08-07 RX ADMIN — FLECAINIDE ACETATE 50 MG: 50 TABLET ORAL at 21:01

## 2019-08-07 RX ADMIN — SODIUM CHLORIDE 100 ML/HR: 9 INJECTION, SOLUTION INTRAVENOUS at 05:38

## 2019-08-07 RX ADMIN — OXYCODONE HYDROCHLORIDE 20 MG: 20 TABLET, FILM COATED, EXTENDED RELEASE ORAL at 09:25

## 2019-08-08 LAB
ALBUMIN SERPL-MCNC: 2.19 G/DL (ref 3.5–5.2)
ALBUMIN/GLOB SERPL: 0.8 G/DL
ALP SERPL-CCNC: 176 U/L (ref 39–117)
ALT SERPL W P-5'-P-CCNC: 9 U/L (ref 1–41)
ANION GAP SERPL CALCULATED.3IONS-SCNC: 10.2 MMOL/L (ref 5–15)
AST SERPL-CCNC: 9 U/L (ref 1–40)
B PERT DNA SPEC QL NAA+PROBE: NOT DETECTED
BASOPHILS # BLD AUTO: 0.03 10*3/MM3 (ref 0–0.2)
BASOPHILS NFR BLD AUTO: 0.2 % (ref 0–1.5)
BILIRUB SERPL-MCNC: 0.3 MG/DL (ref 0.2–1.2)
BUN BLD-MCNC: 11 MG/DL (ref 8–23)
BUN/CREAT SERPL: 9.6 (ref 7–25)
C PNEUM DNA NPH QL NAA+NON-PROBE: NOT DETECTED
CALCIUM SPEC-SCNC: 8.2 MG/DL (ref 8.6–10.5)
CHLORIDE SERPL-SCNC: 99 MMOL/L (ref 98–107)
CO2 SERPL-SCNC: 22.8 MMOL/L (ref 22–29)
CREAT BLD-MCNC: 1.15 MG/DL (ref 0.76–1.27)
CRP SERPL-MCNC: 6.59 MG/DL (ref 0–0.5)
DEPRECATED RDW RBC AUTO: 56.1 FL (ref 37–54)
EOSINOPHIL # BLD AUTO: 0.37 10*3/MM3 (ref 0–0.4)
EOSINOPHIL NFR BLD AUTO: 2 % (ref 0.3–6.2)
ERYTHROCYTE [DISTWIDTH] IN BLOOD BY AUTOMATED COUNT: 17.4 % (ref 12.3–15.4)
FLUAV H1 2009 PAND RNA NPH QL NAA+PROBE: NOT DETECTED
FLUAV H1 HA GENE NPH QL NAA+PROBE: NOT DETECTED
FLUAV H3 RNA NPH QL NAA+PROBE: NOT DETECTED
FLUAV SUBTYP SPEC NAA+PROBE: NOT DETECTED
FLUBV RNA ISLT QL NAA+PROBE: NOT DETECTED
GFR SERPL CREATININE-BSD FRML MDRD: 63 ML/MIN/1.73
GLOBULIN UR ELPH-MCNC: 2.9 GM/DL
GLUCOSE BLD-MCNC: 132 MG/DL (ref 65–99)
GLUCOSE BLDC GLUCOMTR-MCNC: 107 MG/DL (ref 70–130)
GLUCOSE BLDC GLUCOMTR-MCNC: 120 MG/DL (ref 70–130)
GLUCOSE BLDC GLUCOMTR-MCNC: 144 MG/DL (ref 70–130)
HADV DNA SPEC NAA+PROBE: NOT DETECTED
HCOV 229E RNA SPEC QL NAA+PROBE: NOT DETECTED
HCOV HKU1 RNA SPEC QL NAA+PROBE: NOT DETECTED
HCOV NL63 RNA SPEC QL NAA+PROBE: NOT DETECTED
HCOV OC43 RNA SPEC QL NAA+PROBE: NOT DETECTED
HCT VFR BLD AUTO: 22.9 % (ref 37.5–51)
HCT VFR BLD AUTO: 24.5 % (ref 37.5–51)
HGB BLD-MCNC: 7.3 G/DL (ref 13–17.7)
HGB BLD-MCNC: 7.6 G/DL (ref 13–17.7)
HIV 1 & 2 AB SERPLBLD IA.RAPID: NEGATIVE
HIV 2 AB SERPLBLD QL IA.RAPID: NEGATIVE
HIV1 AB SERPLBLD QL IA.RAPID: NEGATIVE
HMPV RNA NPH QL NAA+NON-PROBE: NOT DETECTED
HPIV1 RNA SPEC QL NAA+PROBE: NOT DETECTED
HPIV2 RNA SPEC QL NAA+PROBE: NOT DETECTED
HPIV3 RNA NPH QL NAA+PROBE: NOT DETECTED
HPIV4 P GENE NPH QL NAA+PROBE: NOT DETECTED
IMM GRANULOCYTES # BLD AUTO: 0.29 10*3/MM3 (ref 0–0.05)
IMM GRANULOCYTES NFR BLD AUTO: 1.6 % (ref 0–0.5)
LYMPHOCYTES # BLD AUTO: 1.03 10*3/MM3 (ref 0.7–3.1)
LYMPHOCYTES NFR BLD AUTO: 5.7 % (ref 19.6–45.3)
M PNEUMO IGG SER IA-ACNC: NOT DETECTED
MCH RBC QN AUTO: 28.3 PG (ref 26.6–33)
MCHC RBC AUTO-ENTMCNC: 31.9 G/DL (ref 31.5–35.7)
MCV RBC AUTO: 88.8 FL (ref 79–97)
MONOCYTES # BLD AUTO: 1.23 10*3/MM3 (ref 0.1–0.9)
MONOCYTES NFR BLD AUTO: 6.8 % (ref 5–12)
NEUTROPHILS # BLD AUTO: 15.2 10*3/MM3 (ref 1.7–7)
NEUTROPHILS NFR BLD AUTO: 83.7 % (ref 42.7–76)
NRBC BLD AUTO-RTO: 0 /100 WBC (ref 0–0.2)
PLATELET # BLD AUTO: 118 10*3/MM3 (ref 140–450)
PMV BLD AUTO: 9.6 FL (ref 6–12)
POTASSIUM BLD-SCNC: 4 MMOL/L (ref 3.5–5.2)
PROT SERPL-MCNC: 5.1 G/DL (ref 6–8.5)
RBC # BLD AUTO: 2.58 10*6/MM3 (ref 4.14–5.8)
RHINOVIRUS RNA SPEC NAA+PROBE: NOT DETECTED
RSV RNA NPH QL NAA+NON-PROBE: NOT DETECTED
SODIUM BLD-SCNC: 132 MMOL/L (ref 136–145)
WBC NRBC COR # BLD: 18.15 10*3/MM3 (ref 3.4–10.8)

## 2019-08-08 PROCEDURE — 25010000002 MORPHINE PER 10 MG: Performed by: HOSPITALIST

## 2019-08-08 PROCEDURE — 0099U HC BIOFIRE FILMARRAY RESP PANEL 1: CPT | Performed by: INTERNAL MEDICINE

## 2019-08-08 PROCEDURE — 94799 UNLISTED PULMONARY SVC/PX: CPT

## 2019-08-08 PROCEDURE — 86140 C-REACTIVE PROTEIN: CPT | Performed by: INTERNAL MEDICINE

## 2019-08-08 PROCEDURE — 25010000002 ERTAPENEM 1 GM/100ML SOLUTION: Performed by: INTERNAL MEDICINE

## 2019-08-08 PROCEDURE — 80053 COMPREHEN METABOLIC PANEL: CPT | Performed by: INTERNAL MEDICINE

## 2019-08-08 PROCEDURE — 99233 SBSQ HOSP IP/OBS HIGH 50: CPT | Performed by: INTERNAL MEDICINE

## 2019-08-08 PROCEDURE — 85018 HEMOGLOBIN: CPT | Performed by: INTERNAL MEDICINE

## 2019-08-08 PROCEDURE — 82962 GLUCOSE BLOOD TEST: CPT

## 2019-08-08 PROCEDURE — 85025 COMPLETE CBC W/AUTO DIFF WBC: CPT | Performed by: INTERNAL MEDICINE

## 2019-08-08 PROCEDURE — 85014 HEMATOCRIT: CPT | Performed by: INTERNAL MEDICINE

## 2019-08-08 RX ADMIN — OXYCODONE HYDROCHLORIDE 20 MG: 20 TABLET, FILM COATED, EXTENDED RELEASE ORAL at 21:49

## 2019-08-08 RX ADMIN — MORPHINE SULFATE 2 MG: 2 INJECTION, SOLUTION INTRAMUSCULAR; INTRAVENOUS at 11:04

## 2019-08-08 RX ADMIN — FLUOXETINE 40 MG: 20 CAPSULE ORAL at 07:20

## 2019-08-08 RX ADMIN — ALLOPURINOL 100 MG: 100 TABLET ORAL at 09:31

## 2019-08-08 RX ADMIN — APIXABAN 5 MG: 5 TABLET, FILM COATED ORAL at 09:31

## 2019-08-08 RX ADMIN — BUDESONIDE 0.5 MG: 0.5 INHALANT RESPIRATORY (INHALATION) at 19:05

## 2019-08-08 RX ADMIN — ONDANSETRON 8 MG: 4 TABLET, FILM COATED ORAL at 16:31

## 2019-08-08 RX ADMIN — SODIUM CHLORIDE, PRESERVATIVE FREE 10 ML: 5 INJECTION INTRAVENOUS at 19:33

## 2019-08-08 RX ADMIN — FLECAINIDE ACETATE 50 MG: 50 TABLET ORAL at 09:31

## 2019-08-08 RX ADMIN — CHOLECALCIFEROL TAB 10 MCG (400 UNIT) 1000 UNITS: 10 TAB at 09:31

## 2019-08-08 RX ADMIN — PANTOPRAZOLE SODIUM 40 MG: 40 TABLET, DELAYED RELEASE ORAL at 07:20

## 2019-08-08 RX ADMIN — DOXYCYCLINE 100 MG: 100 INJECTION, POWDER, LYOPHILIZED, FOR SOLUTION INTRAVENOUS at 03:03

## 2019-08-08 RX ADMIN — MORPHINE SULFATE 2 MG: 2 INJECTION, SOLUTION INTRAMUSCULAR; INTRAVENOUS at 19:32

## 2019-08-08 RX ADMIN — SENNOSIDES, DOCUSATE SODIUM 2 TABLET: 50; 8.6 TABLET, FILM COATED ORAL at 09:31

## 2019-08-08 RX ADMIN — SODIUM CHLORIDE 100 ML/HR: 9 INJECTION, SOLUTION INTRAVENOUS at 15:33

## 2019-08-08 RX ADMIN — OXYCODONE HYDROCHLORIDE 10 MG: 5 TABLET ORAL at 16:25

## 2019-08-08 RX ADMIN — ERTAPENEM SODIUM 1 G: 1 INJECTION, POWDER, LYOPHILIZED, FOR SOLUTION INTRAMUSCULAR; INTRAVENOUS at 22:02

## 2019-08-08 RX ADMIN — MONTELUKAST SODIUM 10 MG: 10 TABLET, COATED ORAL at 21:50

## 2019-08-08 RX ADMIN — ALLOPURINOL 100 MG: 100 TABLET ORAL at 22:03

## 2019-08-08 RX ADMIN — SODIUM CHLORIDE, PRESERVATIVE FREE 3 ML: 5 INJECTION INTRAVENOUS at 09:32

## 2019-08-08 RX ADMIN — SODIUM CHLORIDE, PRESERVATIVE FREE 3 ML: 5 INJECTION INTRAVENOUS at 21:52

## 2019-08-08 RX ADMIN — OXYCODONE HYDROCHLORIDE 20 MG: 20 TABLET, FILM COATED, EXTENDED RELEASE ORAL at 08:05

## 2019-08-08 RX ADMIN — SENNOSIDES, DOCUSATE SODIUM 2 TABLET: 50; 8.6 TABLET, FILM COATED ORAL at 21:50

## 2019-08-08 RX ADMIN — BUDESONIDE 0.5 MG: 0.5 INHALANT RESPIRATORY (INHALATION) at 07:06

## 2019-08-08 RX ADMIN — APIXABAN 5 MG: 5 TABLET, FILM COATED ORAL at 21:50

## 2019-08-08 RX ADMIN — ONDANSETRON 8 MG: 4 TABLET, FILM COATED ORAL at 08:05

## 2019-08-08 RX ADMIN — FLECAINIDE ACETATE 50 MG: 50 TABLET ORAL at 21:50

## 2019-08-08 RX ADMIN — DOXYCYCLINE 100 MG: 100 INJECTION, POWDER, LYOPHILIZED, FOR SOLUTION INTRAVENOUS at 14:35

## 2019-08-08 RX ADMIN — SODIUM CHLORIDE 100 ML/HR: 9 INJECTION, SOLUTION INTRAVENOUS at 04:19

## 2019-08-08 RX ADMIN — OXYCODONE HYDROCHLORIDE 10 MG: 5 TABLET ORAL at 09:31

## 2019-08-08 RX ADMIN — CETIRIZINE HYDROCHLORIDE 5 MG: 10 TABLET, FILM COATED ORAL at 09:32

## 2019-08-09 ENCOUNTER — APPOINTMENT (OUTPATIENT)
Dept: CT IMAGING | Facility: HOSPITAL | Age: 69
End: 2019-08-09

## 2019-08-09 ENCOUNTER — APPOINTMENT (OUTPATIENT)
Dept: GENERAL RADIOLOGY | Facility: HOSPITAL | Age: 69
End: 2019-08-09

## 2019-08-09 LAB
ALBUMIN SERPL-MCNC: 2.03 G/DL (ref 3.5–5.2)
ALBUMIN/GLOB SERPL: 0.7 G/DL
ALP SERPL-CCNC: 161 U/L (ref 39–117)
ALT SERPL W P-5'-P-CCNC: 8 U/L (ref 1–41)
ANION GAP SERPL CALCULATED.3IONS-SCNC: 10.5 MMOL/L (ref 5–15)
AST SERPL-CCNC: 12 U/L (ref 1–40)
BASOPHILS # BLD AUTO: 0.06 10*3/MM3 (ref 0–0.2)
BASOPHILS NFR BLD AUTO: 0.4 % (ref 0–1.5)
BILIRUB SERPL-MCNC: 0.3 MG/DL (ref 0.2–1.2)
BUN BLD-MCNC: 10 MG/DL (ref 8–23)
BUN/CREAT SERPL: 9.4 (ref 7–25)
CALCIUM SPEC-SCNC: 8.2 MG/DL (ref 8.6–10.5)
CHLORIDE SERPL-SCNC: 93 MMOL/L (ref 98–107)
CO2 SERPL-SCNC: 22.5 MMOL/L (ref 22–29)
CREAT BLD-MCNC: 1.06 MG/DL (ref 0.76–1.27)
CRP SERPL-MCNC: 6.52 MG/DL (ref 0–0.5)
DEPRECATED RDW RBC AUTO: 52.4 FL (ref 37–54)
EOSINOPHIL # BLD AUTO: 0.41 10*3/MM3 (ref 0–0.4)
EOSINOPHIL NFR BLD AUTO: 3 % (ref 0.3–6.2)
ERYTHROCYTE [DISTWIDTH] IN BLOOD BY AUTOMATED COUNT: 16.7 % (ref 12.3–15.4)
GFR SERPL CREATININE-BSD FRML MDRD: 69 ML/MIN/1.73
GLOBULIN UR ELPH-MCNC: 3.1 GM/DL
GLUCOSE BLD-MCNC: 108 MG/DL (ref 65–99)
GLUCOSE BLDC GLUCOMTR-MCNC: 121 MG/DL (ref 70–130)
GLUCOSE BLDC GLUCOMTR-MCNC: 138 MG/DL (ref 70–130)
GLUCOSE BLDC GLUCOMTR-MCNC: 156 MG/DL (ref 70–130)
GLUCOSE BLDC GLUCOMTR-MCNC: 168 MG/DL (ref 70–130)
H PYLORI IGA SER IA-ACNC: <9 UNITS (ref 0–8.9)
H PYLORI IGG SER IA-ACNC: 0.33 INDEX VALUE (ref 0–0.79)
H PYLORI IGM SER-ACNC: <9 UNITS (ref 0–8.9)
HCT VFR BLD AUTO: 24.3 % (ref 37.5–51)
HGB BLD-MCNC: 7.4 G/DL (ref 13–17.7)
IMM GRANULOCYTES # BLD AUTO: 0.18 10*3/MM3 (ref 0–0.05)
IMM GRANULOCYTES NFR BLD AUTO: 1.3 % (ref 0–0.5)
LYMPHOCYTES # BLD AUTO: 1.18 10*3/MM3 (ref 0.7–3.1)
LYMPHOCYTES NFR BLD AUTO: 8.8 % (ref 19.6–45.3)
MCH RBC QN AUTO: 27.2 PG (ref 26.6–33)
MCHC RBC AUTO-ENTMCNC: 30.5 G/DL (ref 31.5–35.7)
MCV RBC AUTO: 89.3 FL (ref 79–97)
MONOCYTES # BLD AUTO: 0.98 10*3/MM3 (ref 0.1–0.9)
MONOCYTES NFR BLD AUTO: 7.3 % (ref 5–12)
NEUTROPHILS # BLD AUTO: 10.64 10*3/MM3 (ref 1.7–7)
NEUTROPHILS NFR BLD AUTO: 79.2 % (ref 42.7–76)
PLATELET # BLD AUTO: 169 10*3/MM3 (ref 140–450)
PMV BLD AUTO: 9.7 FL (ref 6–12)
POTASSIUM BLD-SCNC: 4 MMOL/L (ref 3.5–5.2)
PROT SERPL-MCNC: 5.1 G/DL (ref 6–8.5)
RBC # BLD AUTO: 2.72 10*6/MM3 (ref 4.14–5.8)
SODIUM BLD-SCNC: 126 MMOL/L (ref 136–145)
WBC NRBC COR # BLD: 13.45 10*3/MM3 (ref 3.4–10.8)

## 2019-08-09 PROCEDURE — 63710000001 INSULIN ASPART PER 5 UNITS: Performed by: NURSE PRACTITIONER

## 2019-08-09 PROCEDURE — 74150 CT ABDOMEN W/O CONTRAST: CPT

## 2019-08-09 PROCEDURE — 63710000001 PROCHLORPERAZINE MALEATE PER 10 MG: Performed by: HOSPITALIST

## 2019-08-09 PROCEDURE — 94799 UNLISTED PULMONARY SVC/PX: CPT

## 2019-08-09 PROCEDURE — 25010000002 VANCOMYCIN 5 G RECONSTITUTED SOLUTION 5,000 MG VIAL: Performed by: NURSE PRACTITIONER

## 2019-08-09 PROCEDURE — 74150 CT ABDOMEN W/O CONTRAST: CPT | Performed by: RADIOLOGY

## 2019-08-09 PROCEDURE — 25010000003 MICAFUNGIN SODIUM 100 MG RECONSTITUTED SOLUTION 1 EACH VIAL: Performed by: NURSE PRACTITIONER

## 2019-08-09 PROCEDURE — 99232 SBSQ HOSP IP/OBS MODERATE 35: CPT | Performed by: INTERNAL MEDICINE

## 2019-08-09 PROCEDURE — 25010000002 MEROPENEM: Performed by: NURSE PRACTITIONER

## 2019-08-09 PROCEDURE — 85025 COMPLETE CBC W/AUTO DIFF WBC: CPT | Performed by: INTERNAL MEDICINE

## 2019-08-09 PROCEDURE — 71045 X-RAY EXAM CHEST 1 VIEW: CPT | Performed by: RADIOLOGY

## 2019-08-09 PROCEDURE — 99233 SBSQ HOSP IP/OBS HIGH 50: CPT | Performed by: NURSE PRACTITIONER

## 2019-08-09 PROCEDURE — 82962 GLUCOSE BLOOD TEST: CPT

## 2019-08-09 PROCEDURE — 71045 X-RAY EXAM CHEST 1 VIEW: CPT

## 2019-08-09 PROCEDURE — 80053 COMPREHEN METABOLIC PANEL: CPT | Performed by: INTERNAL MEDICINE

## 2019-08-09 PROCEDURE — G0108 DIAB MANAGE TRN  PER INDIV: HCPCS

## 2019-08-09 PROCEDURE — 86140 C-REACTIVE PROTEIN: CPT | Performed by: NURSE PRACTITIONER

## 2019-08-09 PROCEDURE — 25010000002 MORPHINE PER 10 MG: Performed by: INTERNAL MEDICINE

## 2019-08-09 PROCEDURE — 25010000002 MORPHINE PER 10 MG: Performed by: HOSPITALIST

## 2019-08-09 RX ORDER — LACTULOSE 10 G/15ML
10 SOLUTION ORAL ONCE
Status: COMPLETED | OUTPATIENT
Start: 2019-08-09 | End: 2019-08-09

## 2019-08-09 RX ORDER — MORPHINE SULFATE 2 MG/ML
2 INJECTION, SOLUTION INTRAMUSCULAR; INTRAVENOUS
Status: DISCONTINUED | OUTPATIENT
Start: 2019-08-09 | End: 2019-08-28

## 2019-08-09 RX ORDER — ACETAMINOPHEN 325 MG/1
650 TABLET ORAL EVERY 6 HOURS PRN
Status: DISCONTINUED | OUTPATIENT
Start: 2019-08-09 | End: 2019-09-05 | Stop reason: HOSPADM

## 2019-08-09 RX ORDER — ONDANSETRON 4 MG/1
8 TABLET, FILM COATED ORAL EVERY 8 HOURS
Status: DISCONTINUED | OUTPATIENT
Start: 2019-08-09 | End: 2019-08-13

## 2019-08-09 RX ADMIN — SODIUM CHLORIDE 100 ML/HR: 9 INJECTION, SOLUTION INTRAVENOUS at 16:23

## 2019-08-09 RX ADMIN — MORPHINE SULFATE 2 MG: 2 INJECTION, SOLUTION INTRAMUSCULAR; INTRAVENOUS at 11:57

## 2019-08-09 RX ADMIN — INSULIN ASPART 2 UNITS: 100 INJECTION, SOLUTION INTRAVENOUS; SUBCUTANEOUS at 13:02

## 2019-08-09 RX ADMIN — CHOLECALCIFEROL TAB 10 MCG (400 UNIT) 1000 UNITS: 10 TAB at 09:08

## 2019-08-09 RX ADMIN — ONDANSETRON 8 MG: 4 TABLET, FILM COATED ORAL at 17:19

## 2019-08-09 RX ADMIN — ALLOPURINOL 100 MG: 100 TABLET ORAL at 22:22

## 2019-08-09 RX ADMIN — OXYCODONE HYDROCHLORIDE 20 MG: 20 TABLET, FILM COATED, EXTENDED RELEASE ORAL at 08:29

## 2019-08-09 RX ADMIN — FLECAINIDE ACETATE 50 MG: 50 TABLET ORAL at 22:22

## 2019-08-09 RX ADMIN — INSULIN ASPART 2 UNITS: 100 INJECTION, SOLUTION INTRAVENOUS; SUBCUTANEOUS at 22:23

## 2019-08-09 RX ADMIN — SODIUM CHLORIDE, PRESERVATIVE FREE 10 ML: 5 INJECTION INTRAVENOUS at 00:38

## 2019-08-09 RX ADMIN — FLECAINIDE ACETATE 50 MG: 50 TABLET ORAL at 09:08

## 2019-08-09 RX ADMIN — APIXABAN 5 MG: 5 TABLET, FILM COATED ORAL at 22:22

## 2019-08-09 RX ADMIN — LACTULOSE 10 G: 20 SOLUTION ORAL at 11:55

## 2019-08-09 RX ADMIN — BUDESONIDE 0.5 MG: 0.5 INHALANT RESPIRATORY (INHALATION) at 06:50

## 2019-08-09 RX ADMIN — POLYETHYLENE GLYCOL (3350) 17 G: 17 POWDER, FOR SOLUTION ORAL at 11:55

## 2019-08-09 RX ADMIN — MICAFUNGIN SODIUM 100 MG: 20 INJECTION, POWDER, LYOPHILIZED, FOR SOLUTION INTRAVENOUS at 15:10

## 2019-08-09 RX ADMIN — MORPHINE SULFATE 2 MG: 2 INJECTION, SOLUTION INTRAMUSCULAR; INTRAVENOUS at 00:38

## 2019-08-09 RX ADMIN — VANCOMYCIN HYDROCHLORIDE 750 MG: 5 INJECTION, POWDER, LYOPHILIZED, FOR SOLUTION INTRAVENOUS at 15:18

## 2019-08-09 RX ADMIN — ALBUTEROL SULFATE 2.5 MG: 2.5 SOLUTION RESPIRATORY (INHALATION) at 10:25

## 2019-08-09 RX ADMIN — ALBUTEROL SULFATE 2.5 MG: 2.5 SOLUTION RESPIRATORY (INHALATION) at 22:30

## 2019-08-09 RX ADMIN — MONTELUKAST SODIUM 10 MG: 10 TABLET, COATED ORAL at 22:21

## 2019-08-09 RX ADMIN — SODIUM CHLORIDE, PRESERVATIVE FREE 3 ML: 5 INJECTION INTRAVENOUS at 22:22

## 2019-08-09 RX ADMIN — PANTOPRAZOLE SODIUM 40 MG: 40 TABLET, DELAYED RELEASE ORAL at 06:38

## 2019-08-09 RX ADMIN — MORPHINE SULFATE 2 MG: 2 INJECTION, SOLUTION INTRAMUSCULAR; INTRAVENOUS at 17:28

## 2019-08-09 RX ADMIN — FLUOXETINE 40 MG: 20 CAPSULE ORAL at 06:38

## 2019-08-09 RX ADMIN — SODIUM CHLORIDE, PRESERVATIVE FREE 3 ML: 5 INJECTION INTRAVENOUS at 09:11

## 2019-08-09 RX ADMIN — MEROPENEM 1 G: 1 INJECTION, POWDER, FOR SOLUTION INTRAVENOUS at 13:48

## 2019-08-09 RX ADMIN — APIXABAN 5 MG: 5 TABLET, FILM COATED ORAL at 09:09

## 2019-08-09 RX ADMIN — SENNOSIDES, DOCUSATE SODIUM 2 TABLET: 50; 8.6 TABLET, FILM COATED ORAL at 22:21

## 2019-08-09 RX ADMIN — PROCHLORPERAZINE MALEATE 10 MG: 10 TABLET ORAL at 15:09

## 2019-08-09 RX ADMIN — ALBUTEROL SULFATE 2.5 MG: 2.5 SOLUTION RESPIRATORY (INHALATION) at 18:39

## 2019-08-09 RX ADMIN — CETIRIZINE HYDROCHLORIDE 5 MG: 10 TABLET, FILM COATED ORAL at 09:07

## 2019-08-09 RX ADMIN — MEROPENEM 1 G: 1 INJECTION, POWDER, FOR SOLUTION INTRAVENOUS at 19:01

## 2019-08-09 RX ADMIN — ACETAMINOPHEN 650 MG: 325 TABLET ORAL at 22:22

## 2019-08-09 RX ADMIN — BUDESONIDE 0.5 MG: 0.5 INHALANT RESPIRATORY (INHALATION) at 18:39

## 2019-08-09 RX ADMIN — SENNOSIDES, DOCUSATE SODIUM 2 TABLET: 50; 8.6 TABLET, FILM COATED ORAL at 09:07

## 2019-08-09 RX ADMIN — ALLOPURINOL 100 MG: 100 TABLET ORAL at 09:07

## 2019-08-09 RX ADMIN — SODIUM CHLORIDE 100 ML/HR: 9 INJECTION, SOLUTION INTRAVENOUS at 04:06

## 2019-08-09 RX ADMIN — ONDANSETRON 8 MG: 4 TABLET, FILM COATED ORAL at 08:53

## 2019-08-09 RX ADMIN — OXYCODONE HYDROCHLORIDE 20 MG: 20 TABLET, FILM COATED, EXTENDED RELEASE ORAL at 22:21

## 2019-08-09 RX ADMIN — MORPHINE SULFATE 2 MG: 2 INJECTION, SOLUTION INTRAMUSCULAR; INTRAVENOUS at 08:54

## 2019-08-09 RX ADMIN — DOXYCYCLINE 100 MG: 100 INJECTION, POWDER, LYOPHILIZED, FOR SOLUTION INTRAVENOUS at 04:05

## 2019-08-10 LAB
ALBUMIN SERPL-MCNC: 2.21 G/DL (ref 3.5–5.2)
ALBUMIN/GLOB SERPL: 0.7 G/DL
ALP SERPL-CCNC: 154 U/L (ref 39–117)
ALT SERPL W P-5'-P-CCNC: 7 U/L (ref 1–41)
ANION GAP SERPL CALCULATED.3IONS-SCNC: 9.9 MMOL/L (ref 5–15)
AST SERPL-CCNC: 14 U/L (ref 1–40)
BASOPHILS # BLD AUTO: 0.06 10*3/MM3 (ref 0–0.2)
BASOPHILS NFR BLD AUTO: 0.4 % (ref 0–1.5)
BILIRUB SERPL-MCNC: 0.3 MG/DL (ref 0.2–1.2)
BUN BLD-MCNC: 9 MG/DL (ref 8–23)
BUN/CREAT SERPL: 9.8 (ref 7–25)
CALCIUM SPEC-SCNC: 8.5 MG/DL (ref 8.6–10.5)
CHLORIDE SERPL-SCNC: 100 MMOL/L (ref 98–107)
CO2 SERPL-SCNC: 23.1 MMOL/L (ref 22–29)
CREAT BLD-MCNC: 0.92 MG/DL (ref 0.76–1.27)
CRP SERPL-MCNC: 6.62 MG/DL (ref 0–0.5)
DEPRECATED RDW RBC AUTO: 55.2 FL (ref 37–54)
EOSINOPHIL # BLD AUTO: 0.25 10*3/MM3 (ref 0–0.4)
EOSINOPHIL NFR BLD AUTO: 1.9 % (ref 0.3–6.2)
ERYTHROCYTE [DISTWIDTH] IN BLOOD BY AUTOMATED COUNT: 17.2 % (ref 12.3–15.4)
GFR SERPL CREATININE-BSD FRML MDRD: 81 ML/MIN/1.73
GLOBULIN UR ELPH-MCNC: 3 GM/DL
GLUCOSE BLD-MCNC: 139 MG/DL (ref 65–99)
GLUCOSE BLDC GLUCOMTR-MCNC: 121 MG/DL (ref 70–130)
GLUCOSE BLDC GLUCOMTR-MCNC: 165 MG/DL (ref 70–130)
GLUCOSE BLDC GLUCOMTR-MCNC: 241 MG/DL (ref 70–130)
HCT VFR BLD AUTO: 24.4 % (ref 37.5–51)
HGB BLD-MCNC: 7.7 G/DL (ref 13–17.7)
IMM GRANULOCYTES # BLD AUTO: 0.2 10*3/MM3 (ref 0–0.05)
IMM GRANULOCYTES NFR BLD AUTO: 1.5 % (ref 0–0.5)
LYMPHOCYTES # BLD AUTO: 1.07 10*3/MM3 (ref 0.7–3.1)
LYMPHOCYTES NFR BLD AUTO: 8 % (ref 19.6–45.3)
MAGNESIUM SERPL-MCNC: 1.6 MG/DL (ref 1.6–2.4)
MCH RBC QN AUTO: 27.6 PG (ref 26.6–33)
MCHC RBC AUTO-ENTMCNC: 31.6 G/DL (ref 31.5–35.7)
MCV RBC AUTO: 87.5 FL (ref 79–97)
MONOCYTES # BLD AUTO: 0.96 10*3/MM3 (ref 0.1–0.9)
MONOCYTES NFR BLD AUTO: 7.1 % (ref 5–12)
NEUTROPHILS # BLD AUTO: 10.89 10*3/MM3 (ref 1.7–7)
NEUTROPHILS NFR BLD AUTO: 81.1 % (ref 42.7–76)
NRBC BLD AUTO-RTO: 0 /100 WBC (ref 0–0.2)
PLATELET # BLD AUTO: 227 10*3/MM3 (ref 140–450)
PMV BLD AUTO: 10.2 FL (ref 6–12)
POTASSIUM BLD-SCNC: 3.9 MMOL/L (ref 3.5–5.2)
PROT SERPL-MCNC: 5.2 G/DL (ref 6–8.5)
RBC # BLD AUTO: 2.79 10*6/MM3 (ref 4.14–5.8)
SODIUM BLD-SCNC: 133 MMOL/L (ref 136–145)
WBC NRBC COR # BLD: 13.43 10*3/MM3 (ref 3.4–10.8)

## 2019-08-10 PROCEDURE — 25010000002 MORPHINE PER 10 MG: Performed by: INTERNAL MEDICINE

## 2019-08-10 PROCEDURE — 25010000002 MEROPENEM: Performed by: NURSE PRACTITIONER

## 2019-08-10 PROCEDURE — 63710000001 INSULIN ASPART PER 5 UNITS: Performed by: NURSE PRACTITIONER

## 2019-08-10 PROCEDURE — 94799 UNLISTED PULMONARY SVC/PX: CPT

## 2019-08-10 PROCEDURE — 86140 C-REACTIVE PROTEIN: CPT | Performed by: NURSE PRACTITIONER

## 2019-08-10 PROCEDURE — 85025 COMPLETE CBC W/AUTO DIFF WBC: CPT | Performed by: INTERNAL MEDICINE

## 2019-08-10 PROCEDURE — 25010000003 MICAFUNGIN SODIUM 100 MG RECONSTITUTED SOLUTION 1 EACH VIAL: Performed by: NURSE PRACTITIONER

## 2019-08-10 PROCEDURE — 25010000002 VANCOMYCIN 5 G RECONSTITUTED SOLUTION 5,000 MG VIAL: Performed by: NURSE PRACTITIONER

## 2019-08-10 PROCEDURE — 82962 GLUCOSE BLOOD TEST: CPT

## 2019-08-10 PROCEDURE — 80053 COMPREHEN METABOLIC PANEL: CPT | Performed by: INTERNAL MEDICINE

## 2019-08-10 PROCEDURE — 83735 ASSAY OF MAGNESIUM: CPT | Performed by: INTERNAL MEDICINE

## 2019-08-10 PROCEDURE — 99233 SBSQ HOSP IP/OBS HIGH 50: CPT | Performed by: INTERNAL MEDICINE

## 2019-08-10 PROCEDURE — 63710000001 PROCHLORPERAZINE MALEATE PER 10 MG: Performed by: HOSPITALIST

## 2019-08-10 RX ORDER — MAGNESIUM SULFATE HEPTAHYDRATE 40 MG/ML
4 INJECTION, SOLUTION INTRAVENOUS ONCE
Status: COMPLETED | OUTPATIENT
Start: 2019-08-10 | End: 2019-08-10

## 2019-08-10 RX ADMIN — ALBUTEROL SULFATE 2.5 MG: 2.5 SOLUTION RESPIRATORY (INHALATION) at 14:22

## 2019-08-10 RX ADMIN — APIXABAN 5 MG: 5 TABLET, FILM COATED ORAL at 21:23

## 2019-08-10 RX ADMIN — INSULIN ASPART 2 UNITS: 100 INJECTION, SOLUTION INTRAVENOUS; SUBCUTANEOUS at 17:06

## 2019-08-10 RX ADMIN — PROCHLORPERAZINE MALEATE 10 MG: 10 TABLET ORAL at 21:24

## 2019-08-10 RX ADMIN — SODIUM CHLORIDE, PRESERVATIVE FREE 3 ML: 5 INJECTION INTRAVENOUS at 21:24

## 2019-08-10 RX ADMIN — ALLOPURINOL 100 MG: 100 TABLET ORAL at 08:59

## 2019-08-10 RX ADMIN — FLECAINIDE ACETATE 50 MG: 50 TABLET ORAL at 21:23

## 2019-08-10 RX ADMIN — OXYCODONE HYDROCHLORIDE 20 MG: 20 TABLET, FILM COATED, EXTENDED RELEASE ORAL at 08:58

## 2019-08-10 RX ADMIN — MEROPENEM 1 G: 1 INJECTION, POWDER, FOR SOLUTION INTRAVENOUS at 21:24

## 2019-08-10 RX ADMIN — FLUOXETINE 40 MG: 20 CAPSULE ORAL at 06:19

## 2019-08-10 RX ADMIN — CETIRIZINE HYDROCHLORIDE 5 MG: 10 TABLET, FILM COATED ORAL at 08:58

## 2019-08-10 RX ADMIN — ONDANSETRON 8 MG: 4 TABLET, FILM COATED ORAL at 00:46

## 2019-08-10 RX ADMIN — APIXABAN 5 MG: 5 TABLET, FILM COATED ORAL at 08:58

## 2019-08-10 RX ADMIN — SODIUM CHLORIDE 100 ML/HR: 9 INJECTION, SOLUTION INTRAVENOUS at 08:59

## 2019-08-10 RX ADMIN — BUDESONIDE 0.5 MG: 0.5 INHALANT RESPIRATORY (INHALATION) at 06:57

## 2019-08-10 RX ADMIN — SENNOSIDES, DOCUSATE SODIUM 2 TABLET: 50; 8.6 TABLET, FILM COATED ORAL at 08:59

## 2019-08-10 RX ADMIN — MONTELUKAST SODIUM 10 MG: 10 TABLET, COATED ORAL at 21:23

## 2019-08-10 RX ADMIN — MICAFUNGIN SODIUM 100 MG: 20 INJECTION, POWDER, LYOPHILIZED, FOR SOLUTION INTRAVENOUS at 13:43

## 2019-08-10 RX ADMIN — OXYCODONE HYDROCHLORIDE 10 MG: 5 TABLET ORAL at 00:46

## 2019-08-10 RX ADMIN — CHOLECALCIFEROL TAB 10 MCG (400 UNIT) 1000 UNITS: 10 TAB at 08:58

## 2019-08-10 RX ADMIN — MORPHINE SULFATE 2 MG: 2 INJECTION, SOLUTION INTRAMUSCULAR; INTRAVENOUS at 19:21

## 2019-08-10 RX ADMIN — PANTOPRAZOLE SODIUM 40 MG: 40 TABLET, DELAYED RELEASE ORAL at 06:19

## 2019-08-10 RX ADMIN — ALBUTEROL SULFATE 2.5 MG: 2.5 SOLUTION RESPIRATORY (INHALATION) at 10:47

## 2019-08-10 RX ADMIN — ALBUTEROL SULFATE 2.5 MG: 2.5 SOLUTION RESPIRATORY (INHALATION) at 02:30

## 2019-08-10 RX ADMIN — MORPHINE SULFATE 2 MG: 2 INJECTION, SOLUTION INTRAMUSCULAR; INTRAVENOUS at 17:06

## 2019-08-10 RX ADMIN — ONDANSETRON 8 MG: 4 TABLET, FILM COATED ORAL at 08:59

## 2019-08-10 RX ADMIN — SODIUM CHLORIDE, PRESERVATIVE FREE 3 ML: 5 INJECTION INTRAVENOUS at 08:59

## 2019-08-10 RX ADMIN — ALLOPURINOL 100 MG: 100 TABLET ORAL at 21:24

## 2019-08-10 RX ADMIN — VANCOMYCIN HYDROCHLORIDE 750 MG: 5 INJECTION, POWDER, LYOPHILIZED, FOR SOLUTION INTRAVENOUS at 15:08

## 2019-08-10 RX ADMIN — MEROPENEM 1 G: 1 INJECTION, POWDER, FOR SOLUTION INTRAVENOUS at 03:40

## 2019-08-10 RX ADMIN — FLECAINIDE ACETATE 50 MG: 50 TABLET ORAL at 09:00

## 2019-08-10 RX ADMIN — INSULIN ASPART 3 UNITS: 100 INJECTION, SOLUTION INTRAVENOUS; SUBCUTANEOUS at 13:43

## 2019-08-10 RX ADMIN — OXYCODONE HYDROCHLORIDE 20 MG: 20 TABLET, FILM COATED, EXTENDED RELEASE ORAL at 21:24

## 2019-08-10 RX ADMIN — ALBUTEROL SULFATE 2.5 MG: 2.5 SOLUTION RESPIRATORY (INHALATION) at 06:56

## 2019-08-10 RX ADMIN — MEROPENEM 1 G: 1 INJECTION, POWDER, FOR SOLUTION INTRAVENOUS at 11:20

## 2019-08-10 RX ADMIN — POLYETHYLENE GLYCOL (3350) 17 G: 17 POWDER, FOR SOLUTION ORAL at 08:58

## 2019-08-10 RX ADMIN — VANCOMYCIN HYDROCHLORIDE 750 MG: 5 INJECTION, POWDER, LYOPHILIZED, FOR SOLUTION INTRAVENOUS at 02:00

## 2019-08-10 RX ADMIN — MAGNESIUM SULFATE HEPTAHYDRATE 4 G: 40 INJECTION, SOLUTION INTRAVENOUS at 11:20

## 2019-08-10 RX ADMIN — ONDANSETRON 8 MG: 4 TABLET, FILM COATED ORAL at 17:06

## 2019-08-10 RX ADMIN — SENNOSIDES, DOCUSATE SODIUM 2 TABLET: 50; 8.6 TABLET, FILM COATED ORAL at 21:23

## 2019-08-11 LAB
ALBUMIN SERPL-MCNC: 2.16 G/DL (ref 3.5–5.2)
ALBUMIN/GLOB SERPL: 0.7 G/DL
ALP SERPL-CCNC: 145 U/L (ref 39–117)
ALT SERPL W P-5'-P-CCNC: 8 U/L (ref 1–41)
ANION GAP SERPL CALCULATED.3IONS-SCNC: 10.2 MMOL/L (ref 5–15)
ANISOCYTOSIS BLD QL: ABNORMAL
AST SERPL-CCNC: 15 U/L (ref 1–40)
BACTERIA SPEC AEROBE CULT: NORMAL
BACTERIA SPEC AEROBE CULT: NORMAL
BASOPHILS # BLD MANUAL: 0.25 10*3/MM3 (ref 0–0.2)
BASOPHILS NFR BLD AUTO: 2 % (ref 0–1.5)
BILIRUB SERPL-MCNC: 0.4 MG/DL (ref 0.2–1.2)
BUN BLD-MCNC: 8 MG/DL (ref 8–23)
BUN/CREAT SERPL: 7.8 (ref 7–25)
C3 FRG RBC-MCNC: ABNORMAL
CALCIUM SPEC-SCNC: 8.4 MG/DL (ref 8.6–10.5)
CHLORIDE SERPL-SCNC: 97 MMOL/L (ref 98–107)
CO2 SERPL-SCNC: 23.8 MMOL/L (ref 22–29)
CREAT BLD-MCNC: 1.03 MG/DL (ref 0.76–1.27)
CRP SERPL-MCNC: 7.41 MG/DL (ref 0–0.5)
DACRYOCYTES BLD QL SMEAR: ABNORMAL
DEPRECATED RDW RBC AUTO: 53.6 FL (ref 37–54)
EOSINOPHIL # BLD MANUAL: 0.13 10*3/MM3 (ref 0–0.4)
EOSINOPHIL NFR BLD MANUAL: 1 % (ref 0.3–6.2)
ERYTHROCYTE [DISTWIDTH] IN BLOOD BY AUTOMATED COUNT: 17.2 % (ref 12.3–15.4)
GFR SERPL CREATININE-BSD FRML MDRD: 71 ML/MIN/1.73
GLOBULIN UR ELPH-MCNC: 2.9 GM/DL
GLUCOSE BLD-MCNC: 123 MG/DL (ref 65–99)
GLUCOSE BLDC GLUCOMTR-MCNC: 108 MG/DL (ref 70–130)
GLUCOSE BLDC GLUCOMTR-MCNC: 133 MG/DL (ref 70–130)
GLUCOSE BLDC GLUCOMTR-MCNC: 171 MG/DL (ref 70–130)
GLUCOSE BLDC GLUCOMTR-MCNC: 193 MG/DL (ref 70–130)
HCT VFR BLD AUTO: 24.1 % (ref 37.5–51)
HGB BLD-MCNC: 7.5 G/DL (ref 13–17.7)
HYPOCHROMIA BLD QL: ABNORMAL
LYMPHOCYTES # BLD MANUAL: 1 10*3/MM3 (ref 0.7–3.1)
LYMPHOCYTES NFR BLD MANUAL: 5 % (ref 5–12)
LYMPHOCYTES NFR BLD MANUAL: 8 % (ref 19.6–45.3)
MAGNESIUM SERPL-MCNC: 1.8 MG/DL (ref 1.6–2.4)
MCH RBC QN AUTO: 27.8 PG (ref 26.6–33)
MCHC RBC AUTO-ENTMCNC: 31.1 G/DL (ref 31.5–35.7)
MCV RBC AUTO: 89.3 FL (ref 79–97)
MONOCYTES # BLD AUTO: 0.63 10*3/MM3 (ref 0.1–0.9)
NEUTROPHILS # BLD AUTO: 10.52 10*3/MM3 (ref 1.7–7)
NEUTROPHILS NFR BLD MANUAL: 84 % (ref 42.7–76)
OVALOCYTES BLD QL SMEAR: ABNORMAL
PHOSPHATE SERPL-MCNC: 2.5 MG/DL (ref 2.5–4.5)
PLAT MORPH BLD: NORMAL
PLATELET # BLD AUTO: 330 10*3/MM3 (ref 140–450)
PMV BLD AUTO: 9.7 FL (ref 6–12)
POTASSIUM BLD-SCNC: 4.1 MMOL/L (ref 3.5–5.2)
PROT SERPL-MCNC: 5.1 G/DL (ref 6–8.5)
RBC # BLD AUTO: 2.7 10*6/MM3 (ref 4.14–5.8)
SCAN SLIDE: NORMAL
SODIUM BLD-SCNC: 131 MMOL/L (ref 136–145)
WBC NRBC COR # BLD: 12.52 10*3/MM3 (ref 3.4–10.8)

## 2019-08-11 PROCEDURE — 80053 COMPREHEN METABOLIC PANEL: CPT | Performed by: INTERNAL MEDICINE

## 2019-08-11 PROCEDURE — 99232 SBSQ HOSP IP/OBS MODERATE 35: CPT | Performed by: INTERNAL MEDICINE

## 2019-08-11 PROCEDURE — 63710000001 INSULIN ASPART PER 5 UNITS: Performed by: NURSE PRACTITIONER

## 2019-08-11 PROCEDURE — 82962 GLUCOSE BLOOD TEST: CPT

## 2019-08-11 PROCEDURE — 94799 UNLISTED PULMONARY SVC/PX: CPT

## 2019-08-11 PROCEDURE — 86140 C-REACTIVE PROTEIN: CPT | Performed by: NURSE PRACTITIONER

## 2019-08-11 PROCEDURE — 25010000002 MEROPENEM: Performed by: NURSE PRACTITIONER

## 2019-08-11 PROCEDURE — 85025 COMPLETE CBC W/AUTO DIFF WBC: CPT | Performed by: INTERNAL MEDICINE

## 2019-08-11 PROCEDURE — 85007 BL SMEAR W/DIFF WBC COUNT: CPT | Performed by: INTERNAL MEDICINE

## 2019-08-11 PROCEDURE — 83735 ASSAY OF MAGNESIUM: CPT | Performed by: INTERNAL MEDICINE

## 2019-08-11 PROCEDURE — 25010000002 VANCOMYCIN 5 G RECONSTITUTED SOLUTION 5,000 MG VIAL: Performed by: NURSE PRACTITIONER

## 2019-08-11 PROCEDURE — 25010000002 MORPHINE PER 10 MG: Performed by: INTERNAL MEDICINE

## 2019-08-11 PROCEDURE — 84100 ASSAY OF PHOSPHORUS: CPT | Performed by: INTERNAL MEDICINE

## 2019-08-11 RX ADMIN — BUDESONIDE 0.5 MG: 0.5 INHALANT RESPIRATORY (INHALATION) at 07:53

## 2019-08-11 RX ADMIN — ALBUTEROL SULFATE 2.5 MG: 2.5 SOLUTION RESPIRATORY (INHALATION) at 19:46

## 2019-08-11 RX ADMIN — ALBUTEROL SULFATE 2.5 MG: 2.5 SOLUTION RESPIRATORY (INHALATION) at 14:55

## 2019-08-11 RX ADMIN — SENNOSIDES, DOCUSATE SODIUM 2 TABLET: 50; 8.6 TABLET, FILM COATED ORAL at 23:52

## 2019-08-11 RX ADMIN — MORPHINE SULFATE 2 MG: 2 INJECTION, SOLUTION INTRAMUSCULAR; INTRAVENOUS at 20:49

## 2019-08-11 RX ADMIN — FLECAINIDE ACETATE 50 MG: 50 TABLET ORAL at 08:50

## 2019-08-11 RX ADMIN — OXYCODONE HYDROCHLORIDE 20 MG: 20 TABLET, FILM COATED, EXTENDED RELEASE ORAL at 08:30

## 2019-08-11 RX ADMIN — OXYCODONE HYDROCHLORIDE 20 MG: 20 TABLET, FILM COATED, EXTENDED RELEASE ORAL at 22:03

## 2019-08-11 RX ADMIN — ONDANSETRON 8 MG: 4 TABLET, FILM COATED ORAL at 17:04

## 2019-08-11 RX ADMIN — INSULIN ASPART 2 UNITS: 100 INJECTION, SOLUTION INTRAVENOUS; SUBCUTANEOUS at 12:59

## 2019-08-11 RX ADMIN — ALBUTEROL SULFATE 2.5 MG: 2.5 SOLUTION RESPIRATORY (INHALATION) at 03:52

## 2019-08-11 RX ADMIN — ALBUTEROL SULFATE 2.5 MG: 2.5 SOLUTION RESPIRATORY (INHALATION) at 10:21

## 2019-08-11 RX ADMIN — ALLOPURINOL 100 MG: 100 TABLET ORAL at 08:31

## 2019-08-11 RX ADMIN — MEROPENEM 1 G: 1 INJECTION, POWDER, FOR SOLUTION INTRAVENOUS at 12:59

## 2019-08-11 RX ADMIN — APIXABAN 5 MG: 5 TABLET, FILM COATED ORAL at 21:11

## 2019-08-11 RX ADMIN — OXYCODONE HYDROCHLORIDE 10 MG: 5 TABLET ORAL at 18:40

## 2019-08-11 RX ADMIN — POLYETHYLENE GLYCOL (3350) 17 G: 17 POWDER, FOR SOLUTION ORAL at 08:31

## 2019-08-11 RX ADMIN — CETIRIZINE HYDROCHLORIDE 5 MG: 10 TABLET, FILM COATED ORAL at 08:31

## 2019-08-11 RX ADMIN — MORPHINE SULFATE 2 MG: 2 INJECTION, SOLUTION INTRAMUSCULAR; INTRAVENOUS at 08:49

## 2019-08-11 RX ADMIN — VANCOMYCIN HYDROCHLORIDE 750 MG: 5 INJECTION, POWDER, LYOPHILIZED, FOR SOLUTION INTRAVENOUS at 01:46

## 2019-08-11 RX ADMIN — MEROPENEM 1 G: 1 INJECTION, POWDER, FOR SOLUTION INTRAVENOUS at 18:28

## 2019-08-11 RX ADMIN — FLUOXETINE 40 MG: 20 CAPSULE ORAL at 05:45

## 2019-08-11 RX ADMIN — SENNOSIDES, DOCUSATE SODIUM 2 TABLET: 50; 8.6 TABLET, FILM COATED ORAL at 08:31

## 2019-08-11 RX ADMIN — MEROPENEM 1 G: 1 INJECTION, POWDER, FOR SOLUTION INTRAVENOUS at 03:09

## 2019-08-11 RX ADMIN — MONTELUKAST SODIUM 10 MG: 10 TABLET, COATED ORAL at 21:11

## 2019-08-11 RX ADMIN — SODIUM CHLORIDE, PRESERVATIVE FREE 3 ML: 5 INJECTION INTRAVENOUS at 08:32

## 2019-08-11 RX ADMIN — ALBUTEROL SULFATE 2.5 MG: 2.5 SOLUTION RESPIRATORY (INHALATION) at 07:53

## 2019-08-11 RX ADMIN — BUDESONIDE 0.5 MG: 0.5 INHALANT RESPIRATORY (INHALATION) at 19:46

## 2019-08-11 RX ADMIN — APIXABAN 5 MG: 5 TABLET, FILM COATED ORAL at 08:30

## 2019-08-11 RX ADMIN — INSULIN ASPART 2 UNITS: 100 INJECTION, SOLUTION INTRAVENOUS; SUBCUTANEOUS at 18:29

## 2019-08-11 RX ADMIN — FLECAINIDE ACETATE 50 MG: 50 TABLET ORAL at 21:11

## 2019-08-11 RX ADMIN — PANTOPRAZOLE SODIUM 40 MG: 40 TABLET, DELAYED RELEASE ORAL at 05:45

## 2019-08-11 RX ADMIN — SODIUM CHLORIDE 100 ML/HR: 9 INJECTION, SOLUTION INTRAVENOUS at 03:09

## 2019-08-11 RX ADMIN — ONDANSETRON 8 MG: 4 TABLET, FILM COATED ORAL at 23:52

## 2019-08-11 RX ADMIN — ALLOPURINOL 100 MG: 100 TABLET ORAL at 21:11

## 2019-08-11 RX ADMIN — CHOLECALCIFEROL TAB 10 MCG (400 UNIT) 1000 UNITS: 10 TAB at 08:30

## 2019-08-11 RX ADMIN — SODIUM CHLORIDE, PRESERVATIVE FREE 3 ML: 5 INJECTION INTRAVENOUS at 22:04

## 2019-08-11 RX ADMIN — ONDANSETRON 8 MG: 4 TABLET, FILM COATED ORAL at 01:46

## 2019-08-11 RX ADMIN — ONDANSETRON 8 MG: 4 TABLET, FILM COATED ORAL at 08:30

## 2019-08-12 LAB
ALBUMIN SERPL-MCNC: 2.04 G/DL (ref 3.5–5.2)
ALBUMIN/GLOB SERPL: 0.6 G/DL
ALP SERPL-CCNC: 141 U/L (ref 39–117)
ALT SERPL W P-5'-P-CCNC: 8 U/L (ref 1–41)
AMMONIA BLD-SCNC: 23 UMOL/L (ref 16–60)
ANION GAP SERPL CALCULATED.3IONS-SCNC: 11.5 MMOL/L (ref 5–15)
AST SERPL-CCNC: 24 U/L (ref 1–40)
BASOPHILS # BLD AUTO: 0.04 10*3/MM3 (ref 0–0.2)
BASOPHILS NFR BLD AUTO: 0.3 % (ref 0–1.5)
BILIRUB SERPL-MCNC: 0.4 MG/DL (ref 0.2–1.2)
BILIRUB UR QL STRIP: NEGATIVE
BUN BLD-MCNC: 9 MG/DL (ref 8–23)
BUN/CREAT SERPL: 8 (ref 7–25)
CALCIUM SPEC-SCNC: 8.4 MG/DL (ref 8.6–10.5)
CHLORIDE SERPL-SCNC: 95 MMOL/L (ref 98–107)
CLARITY UR: CLEAR
CO2 SERPL-SCNC: 23.5 MMOL/L (ref 22–29)
COLOR UR: YELLOW
CREAT BLD-MCNC: 1.13 MG/DL (ref 0.76–1.27)
CRP SERPL-MCNC: 7.42 MG/DL (ref 0–0.5)
D-LACTATE SERPL-SCNC: 1.4 MMOL/L (ref 0.5–2)
DEPRECATED RDW RBC AUTO: 53.5 FL (ref 37–54)
EOSINOPHIL # BLD AUTO: 0.41 10*3/MM3 (ref 0–0.4)
EOSINOPHIL NFR BLD AUTO: 3.1 % (ref 0.3–6.2)
ERYTHROCYTE [DISTWIDTH] IN BLOOD BY AUTOMATED COUNT: 17.1 % (ref 12.3–15.4)
GFR SERPL CREATININE-BSD FRML MDRD: 64 ML/MIN/1.73
GLOBULIN UR ELPH-MCNC: 3.2 GM/DL
GLUCOSE BLD-MCNC: 129 MG/DL (ref 65–99)
GLUCOSE BLDC GLUCOMTR-MCNC: 123 MG/DL (ref 70–130)
GLUCOSE BLDC GLUCOMTR-MCNC: 134 MG/DL (ref 70–130)
GLUCOSE BLDC GLUCOMTR-MCNC: 137 MG/DL (ref 70–130)
GLUCOSE BLDC GLUCOMTR-MCNC: 178 MG/DL (ref 70–130)
GLUCOSE UR STRIP-MCNC: NEGATIVE MG/DL
HCT VFR BLD AUTO: 23.9 % (ref 37.5–51)
HGB BLD-MCNC: 7.3 G/DL (ref 13–17.7)
HGB UR QL STRIP.AUTO: NEGATIVE
IMM GRANULOCYTES # BLD AUTO: 0.25 10*3/MM3 (ref 0–0.05)
IMM GRANULOCYTES NFR BLD AUTO: 1.9 % (ref 0–0.5)
KETONES UR QL STRIP: NEGATIVE
LEUKOCYTE ESTERASE UR QL STRIP.AUTO: NEGATIVE
LYMPHOCYTES # BLD AUTO: 1.25 10*3/MM3 (ref 0.7–3.1)
LYMPHOCYTES NFR BLD AUTO: 9.4 % (ref 19.6–45.3)
MAGNESIUM SERPL-MCNC: 1.6 MG/DL (ref 1.6–2.4)
MCH RBC QN AUTO: 27.1 PG (ref 26.6–33)
MCHC RBC AUTO-ENTMCNC: 30.5 G/DL (ref 31.5–35.7)
MCV RBC AUTO: 88.8 FL (ref 79–97)
MONOCYTES # BLD AUTO: 1.2 10*3/MM3 (ref 0.1–0.9)
MONOCYTES NFR BLD AUTO: 9 % (ref 5–12)
NEUTROPHILS # BLD AUTO: 10.17 10*3/MM3 (ref 1.7–7)
NEUTROPHILS NFR BLD AUTO: 76.3 % (ref 42.7–76)
NITRITE UR QL STRIP: NEGATIVE
PH UR STRIP.AUTO: 5.5 [PH] (ref 5–8)
PHOSPHATE SERPL-MCNC: 2.3 MG/DL (ref 2.5–4.5)
PLATELET # BLD AUTO: 396 10*3/MM3 (ref 140–450)
PMV BLD AUTO: 9.6 FL (ref 6–12)
POTASSIUM BLD-SCNC: 4.4 MMOL/L (ref 3.5–5.2)
PROT SERPL-MCNC: 5.2 G/DL (ref 6–8.5)
PROT UR QL STRIP: NEGATIVE
RBC # BLD AUTO: 2.69 10*6/MM3 (ref 4.14–5.8)
SODIUM BLD-SCNC: 130 MMOL/L (ref 136–145)
SP GR UR STRIP: 1.01 (ref 1–1.03)
UROBILINOGEN UR QL STRIP: NORMAL
WBC NRBC COR # BLD: 13.32 10*3/MM3 (ref 3.4–10.8)

## 2019-08-12 PROCEDURE — 83605 ASSAY OF LACTIC ACID: CPT | Performed by: INTERNAL MEDICINE

## 2019-08-12 PROCEDURE — 86140 C-REACTIVE PROTEIN: CPT | Performed by: NURSE PRACTITIONER

## 2019-08-12 PROCEDURE — 82140 ASSAY OF AMMONIA: CPT | Performed by: INTERNAL MEDICINE

## 2019-08-12 PROCEDURE — 85025 COMPLETE CBC W/AUTO DIFF WBC: CPT | Performed by: INTERNAL MEDICINE

## 2019-08-12 PROCEDURE — 82962 GLUCOSE BLOOD TEST: CPT

## 2019-08-12 PROCEDURE — 83735 ASSAY OF MAGNESIUM: CPT | Performed by: INTERNAL MEDICINE

## 2019-08-12 PROCEDURE — 80053 COMPREHEN METABOLIC PANEL: CPT | Performed by: INTERNAL MEDICINE

## 2019-08-12 PROCEDURE — 81003 URINALYSIS AUTO W/O SCOPE: CPT | Performed by: INTERNAL MEDICINE

## 2019-08-12 PROCEDURE — 99233 SBSQ HOSP IP/OBS HIGH 50: CPT | Performed by: INTERNAL MEDICINE

## 2019-08-12 PROCEDURE — 25010000002 MEROPENEM: Performed by: NURSE PRACTITIONER

## 2019-08-12 PROCEDURE — 87040 BLOOD CULTURE FOR BACTERIA: CPT | Performed by: INTERNAL MEDICINE

## 2019-08-12 PROCEDURE — 25010000002 MORPHINE PER 10 MG: Performed by: INTERNAL MEDICINE

## 2019-08-12 PROCEDURE — 63710000001 INSULIN ASPART PER 5 UNITS: Performed by: NURSE PRACTITIONER

## 2019-08-12 PROCEDURE — 94799 UNLISTED PULMONARY SVC/PX: CPT

## 2019-08-12 PROCEDURE — 84100 ASSAY OF PHOSPHORUS: CPT | Performed by: INTERNAL MEDICINE

## 2019-08-12 RX ORDER — DIPHENHYDRAMINE HCL 25 MG
25 CAPSULE ORAL EVERY 6 HOURS PRN
Status: DISCONTINUED | OUTPATIENT
Start: 2019-08-12 | End: 2019-09-05 | Stop reason: HOSPADM

## 2019-08-12 RX ORDER — MAGNESIUM SULFATE HEPTAHYDRATE 40 MG/ML
4 INJECTION, SOLUTION INTRAVENOUS ONCE
Status: COMPLETED | OUTPATIENT
Start: 2019-08-12 | End: 2019-08-12

## 2019-08-12 RX ORDER — ACETAMINOPHEN 325 MG/1
650 TABLET ORAL EVERY 6 HOURS PRN
Status: DISCONTINUED | OUTPATIENT
Start: 2019-08-12 | End: 2019-09-05 | Stop reason: HOSPADM

## 2019-08-12 RX ORDER — IPRATROPIUM BROMIDE AND ALBUTEROL SULFATE 2.5; .5 MG/3ML; MG/3ML
3 SOLUTION RESPIRATORY (INHALATION) EVERY 6 HOURS PRN
Status: DISCONTINUED | OUTPATIENT
Start: 2019-08-12 | End: 2019-08-14

## 2019-08-12 RX ORDER — SODIUM CHLORIDE 9 MG/ML
30 INJECTION, SOLUTION INTRAVENOUS CONTINUOUS
Status: DISCONTINUED | OUTPATIENT
Start: 2019-08-12 | End: 2019-08-19

## 2019-08-12 RX ADMIN — MONTELUKAST SODIUM 10 MG: 10 TABLET, COATED ORAL at 21:22

## 2019-08-12 RX ADMIN — SODIUM CHLORIDE, PRESERVATIVE FREE 3 ML: 5 INJECTION INTRAVENOUS at 08:51

## 2019-08-12 RX ADMIN — INSULIN ASPART 2 UNITS: 100 INJECTION, SOLUTION INTRAVENOUS; SUBCUTANEOUS at 11:48

## 2019-08-12 RX ADMIN — OXYCODONE HYDROCHLORIDE 20 MG: 20 TABLET, FILM COATED, EXTENDED RELEASE ORAL at 08:51

## 2019-08-12 RX ADMIN — PANTOPRAZOLE SODIUM 40 MG: 40 TABLET, DELAYED RELEASE ORAL at 06:24

## 2019-08-12 RX ADMIN — SODIUM CHLORIDE 100 ML/HR: 9 INJECTION, SOLUTION INTRAVENOUS at 18:10

## 2019-08-12 RX ADMIN — MEROPENEM 1 G: 1 INJECTION, POWDER, FOR SOLUTION INTRAVENOUS at 18:06

## 2019-08-12 RX ADMIN — MORPHINE SULFATE 2 MG: 2 INJECTION, SOLUTION INTRAMUSCULAR; INTRAVENOUS at 08:51

## 2019-08-12 RX ADMIN — ALBUTEROL SULFATE 2.5 MG: 2.5 SOLUTION RESPIRATORY (INHALATION) at 10:44

## 2019-08-12 RX ADMIN — ALBUTEROL SULFATE 2.5 MG: 2.5 SOLUTION RESPIRATORY (INHALATION) at 02:58

## 2019-08-12 RX ADMIN — ALLOPURINOL 100 MG: 100 TABLET ORAL at 21:22

## 2019-08-12 RX ADMIN — MEROPENEM 1 G: 1 INJECTION, POWDER, FOR SOLUTION INTRAVENOUS at 11:39

## 2019-08-12 RX ADMIN — ALLOPURINOL 100 MG: 100 TABLET ORAL at 11:33

## 2019-08-12 RX ADMIN — BUDESONIDE 0.5 MG: 0.5 INHALANT RESPIRATORY (INHALATION) at 07:21

## 2019-08-12 RX ADMIN — ONDANSETRON 8 MG: 4 TABLET, FILM COATED ORAL at 08:51

## 2019-08-12 RX ADMIN — CETIRIZINE HYDROCHLORIDE 5 MG: 10 TABLET, FILM COATED ORAL at 11:34

## 2019-08-12 RX ADMIN — SENNOSIDES, DOCUSATE SODIUM 2 TABLET: 50; 8.6 TABLET, FILM COATED ORAL at 11:33

## 2019-08-12 RX ADMIN — MEROPENEM 1 G: 1 INJECTION, POWDER, FOR SOLUTION INTRAVENOUS at 03:27

## 2019-08-12 RX ADMIN — ALBUTEROL SULFATE 2.5 MG: 2.5 SOLUTION RESPIRATORY (INHALATION) at 22:37

## 2019-08-12 RX ADMIN — POLYETHYLENE GLYCOL (3350) 17 G: 17 POWDER, FOR SOLUTION ORAL at 11:31

## 2019-08-12 RX ADMIN — APIXABAN 5 MG: 5 TABLET, FILM COATED ORAL at 11:33

## 2019-08-12 RX ADMIN — OXYCODONE HYDROCHLORIDE 20 MG: 20 TABLET, FILM COATED, EXTENDED RELEASE ORAL at 21:21

## 2019-08-12 RX ADMIN — FLECAINIDE ACETATE 50 MG: 50 TABLET ORAL at 21:22

## 2019-08-12 RX ADMIN — FLUOXETINE 40 MG: 20 CAPSULE ORAL at 06:24

## 2019-08-12 RX ADMIN — ONDANSETRON 8 MG: 4 TABLET, FILM COATED ORAL at 17:10

## 2019-08-12 RX ADMIN — BUDESONIDE 0.5 MG: 0.5 INHALANT RESPIRATORY (INHALATION) at 19:49

## 2019-08-12 RX ADMIN — SENNOSIDES, DOCUSATE SODIUM 2 TABLET: 50; 8.6 TABLET, FILM COATED ORAL at 21:22

## 2019-08-12 RX ADMIN — CHOLECALCIFEROL TAB 10 MCG (400 UNIT) 1000 UNITS: 10 TAB at 11:32

## 2019-08-12 RX ADMIN — APIXABAN 5 MG: 5 TABLET, FILM COATED ORAL at 21:22

## 2019-08-12 RX ADMIN — MAGNESIUM SULFATE HEPTAHYDRATE 4 G: 40 INJECTION, SOLUTION INTRAVENOUS at 06:52

## 2019-08-12 RX ADMIN — MORPHINE SULFATE 2 MG: 2 INJECTION, SOLUTION INTRAMUSCULAR; INTRAVENOUS at 18:31

## 2019-08-12 RX ADMIN — ACETAMINOPHEN 650 MG: 325 TABLET ORAL at 17:10

## 2019-08-12 RX ADMIN — ACETAMINOPHEN 650 MG: 325 TABLET ORAL at 03:26

## 2019-08-12 RX ADMIN — ALBUTEROL SULFATE 2.5 MG: 2.5 SOLUTION RESPIRATORY (INHALATION) at 19:49

## 2019-08-12 RX ADMIN — OXYCODONE HYDROCHLORIDE 10 MG: 5 TABLET ORAL at 15:08

## 2019-08-12 RX ADMIN — ALBUTEROL SULFATE 2.5 MG: 2.5 SOLUTION RESPIRATORY (INHALATION) at 14:35

## 2019-08-12 RX ADMIN — FLECAINIDE ACETATE 50 MG: 50 TABLET ORAL at 11:34

## 2019-08-12 RX ADMIN — ALBUTEROL SULFATE 2.5 MG: 2.5 SOLUTION RESPIRATORY (INHALATION) at 07:20

## 2019-08-13 ENCOUNTER — APPOINTMENT (OUTPATIENT)
Dept: CT IMAGING | Facility: HOSPITAL | Age: 69
End: 2019-08-13

## 2019-08-13 ENCOUNTER — APPOINTMENT (OUTPATIENT)
Dept: ONCOLOGY | Facility: HOSPITAL | Age: 69
End: 2019-08-13
Attending: INTERNAL MEDICINE

## 2019-08-13 LAB
ALBUMIN SERPL-MCNC: 2.19 G/DL (ref 3.5–5.2)
ALBUMIN/GLOB SERPL: 0.7 G/DL
ALP SERPL-CCNC: 135 U/L (ref 39–117)
ALT SERPL W P-5'-P-CCNC: 7 U/L (ref 1–41)
ANION GAP SERPL CALCULATED.3IONS-SCNC: 13.3 MMOL/L (ref 5–15)
ANISOCYTOSIS BLD QL: ABNORMAL
AST SERPL-CCNC: 21 U/L (ref 1–40)
BASOPHILS # BLD MANUAL: 0.13 10*3/MM3 (ref 0–0.2)
BASOPHILS NFR BLD AUTO: 1 % (ref 0–1.5)
BILIRUB SERPL-MCNC: 0.3 MG/DL (ref 0.2–1.2)
BUN BLD-MCNC: 10 MG/DL (ref 8–23)
BUN/CREAT SERPL: 9.6 (ref 7–25)
C3 FRG RBC-MCNC: ABNORMAL
CALCIUM SPEC-SCNC: 8.7 MG/DL (ref 8.6–10.5)
CHLORIDE SERPL-SCNC: 100 MMOL/L (ref 98–107)
CO2 SERPL-SCNC: 24.7 MMOL/L (ref 22–29)
CREAT BLD-MCNC: 1.04 MG/DL (ref 0.76–1.27)
CRP SERPL-MCNC: 7.95 MG/DL (ref 0–0.5)
DEPRECATED RDW RBC AUTO: 53.8 FL (ref 37–54)
EOSINOPHIL # BLD MANUAL: 0.63 10*3/MM3 (ref 0–0.4)
EOSINOPHIL NFR BLD MANUAL: 5 % (ref 0.3–6.2)
ERYTHROCYTE [DISTWIDTH] IN BLOOD BY AUTOMATED COUNT: 17.2 % (ref 12.3–15.4)
GFR SERPL CREATININE-BSD FRML MDRD: 70 ML/MIN/1.73
GLOBULIN UR ELPH-MCNC: 3 GM/DL
GLUCOSE BLD-MCNC: 107 MG/DL (ref 65–99)
GLUCOSE BLDC GLUCOMTR-MCNC: 107 MG/DL (ref 70–130)
GLUCOSE BLDC GLUCOMTR-MCNC: 121 MG/DL (ref 70–130)
GLUCOSE BLDC GLUCOMTR-MCNC: 146 MG/DL (ref 70–130)
GLUCOSE BLDC GLUCOMTR-MCNC: 161 MG/DL (ref 70–130)
HCT VFR BLD AUTO: 24.9 % (ref 37.5–51)
HGB BLD-MCNC: 7.6 G/DL (ref 13–17.7)
HYPOCHROMIA BLD QL: ABNORMAL
LYMPHOCYTES # BLD MANUAL: 0.5 10*3/MM3 (ref 0.7–3.1)
LYMPHOCYTES NFR BLD MANUAL: 12 % (ref 5–12)
LYMPHOCYTES NFR BLD MANUAL: 4 % (ref 19.6–45.3)
MAGNESIUM SERPL-MCNC: 2 MG/DL (ref 1.6–2.4)
MCH RBC QN AUTO: 27.2 PG (ref 26.6–33)
MCHC RBC AUTO-ENTMCNC: 30.5 G/DL (ref 31.5–35.7)
MCV RBC AUTO: 89.2 FL (ref 79–97)
METAMYELOCYTES NFR BLD MANUAL: 2 % (ref 0–0)
MONOCYTES # BLD AUTO: 1.5 10*3/MM3 (ref 0.1–0.9)
MYELOCYTES NFR BLD MANUAL: 1 % (ref 0–0)
NEUTROPHILS # BLD AUTO: 9.38 10*3/MM3 (ref 1.7–7)
NEUTROPHILS NFR BLD MANUAL: 73 % (ref 42.7–76)
NEUTS BAND NFR BLD MANUAL: 2 % (ref 0–5)
OVALOCYTES BLD QL SMEAR: ABNORMAL
PLATELET # BLD AUTO: 502 10*3/MM3 (ref 140–450)
PMV BLD AUTO: 9.4 FL (ref 6–12)
POTASSIUM BLD-SCNC: 4 MMOL/L (ref 3.5–5.2)
PROT SERPL-MCNC: 5.2 G/DL (ref 6–8.5)
RBC # BLD AUTO: 2.79 10*6/MM3 (ref 4.14–5.8)
SCAN SLIDE: NORMAL
SMALL PLATELETS BLD QL SMEAR: ABNORMAL
SODIUM BLD-SCNC: 138 MMOL/L (ref 136–145)
WBC NRBC COR # BLD: 12.5 10*3/MM3 (ref 3.4–10.8)

## 2019-08-13 PROCEDURE — 80053 COMPREHEN METABOLIC PANEL: CPT | Performed by: INTERNAL MEDICINE

## 2019-08-13 PROCEDURE — 25010000002 FUROSEMIDE PER 20 MG: Performed by: INTERNAL MEDICINE

## 2019-08-13 PROCEDURE — 99233 SBSQ HOSP IP/OBS HIGH 50: CPT | Performed by: INTERNAL MEDICINE

## 2019-08-13 PROCEDURE — 63710000001 INSULIN ASPART PER 5 UNITS: Performed by: NURSE PRACTITIONER

## 2019-08-13 PROCEDURE — 85007 BL SMEAR W/DIFF WBC COUNT: CPT | Performed by: INTERNAL MEDICINE

## 2019-08-13 PROCEDURE — 70470 CT HEAD/BRAIN W/O & W/DYE: CPT

## 2019-08-13 PROCEDURE — 63710000001 DIPHENHYDRAMINE PER 50 MG: Performed by: INTERNAL MEDICINE

## 2019-08-13 PROCEDURE — 94799 UNLISTED PULMONARY SVC/PX: CPT

## 2019-08-13 PROCEDURE — 86140 C-REACTIVE PROTEIN: CPT | Performed by: NURSE PRACTITIONER

## 2019-08-13 PROCEDURE — 25010000003 MICAFUNGIN SODIUM 100 MG RECONSTITUTED SOLUTION 1 EACH VIAL: Performed by: NURSE PRACTITIONER

## 2019-08-13 PROCEDURE — 85025 COMPLETE CBC W/AUTO DIFF WBC: CPT | Performed by: INTERNAL MEDICINE

## 2019-08-13 PROCEDURE — 0 IOVERSOL 68 % SOLUTION: Performed by: INTERNAL MEDICINE

## 2019-08-13 PROCEDURE — 71260 CT THORAX DX C+: CPT

## 2019-08-13 PROCEDURE — 83735 ASSAY OF MAGNESIUM: CPT | Performed by: INTERNAL MEDICINE

## 2019-08-13 PROCEDURE — 25010000002 MEROPENEM: Performed by: NURSE PRACTITIONER

## 2019-08-13 PROCEDURE — 82962 GLUCOSE BLOOD TEST: CPT

## 2019-08-13 RX ORDER — ONDANSETRON 4 MG/1
8 TABLET, FILM COATED ORAL EVERY 8 HOURS PRN
Status: DISCONTINUED | OUTPATIENT
Start: 2019-08-13 | End: 2019-09-05 | Stop reason: HOSPADM

## 2019-08-13 RX ORDER — FUROSEMIDE 10 MG/ML
20 INJECTION INTRAMUSCULAR; INTRAVENOUS ONCE
Status: COMPLETED | OUTPATIENT
Start: 2019-08-13 | End: 2019-08-13

## 2019-08-13 RX ADMIN — APIXABAN 5 MG: 5 TABLET, FILM COATED ORAL at 09:28

## 2019-08-13 RX ADMIN — ALBUTEROL SULFATE 2.5 MG: 2.5 SOLUTION RESPIRATORY (INHALATION) at 02:15

## 2019-08-13 RX ADMIN — MEROPENEM 1 G: 1 INJECTION, POWDER, FOR SOLUTION INTRAVENOUS at 18:01

## 2019-08-13 RX ADMIN — ALBUTEROL SULFATE 2.5 MG: 2.5 SOLUTION RESPIRATORY (INHALATION) at 10:16

## 2019-08-13 RX ADMIN — SODIUM CHLORIDE 100 ML/HR: 9 INJECTION, SOLUTION INTRAVENOUS at 05:27

## 2019-08-13 RX ADMIN — MONTELUKAST SODIUM 10 MG: 10 TABLET, COATED ORAL at 20:19

## 2019-08-13 RX ADMIN — FLECAINIDE ACETATE 50 MG: 50 TABLET ORAL at 20:19

## 2019-08-13 RX ADMIN — ALBUTEROL SULFATE 2.5 MG: 2.5 SOLUTION RESPIRATORY (INHALATION) at 14:25

## 2019-08-13 RX ADMIN — MICAFUNGIN SODIUM 100 MG: 20 INJECTION, POWDER, LYOPHILIZED, FOR SOLUTION INTRAVENOUS at 14:58

## 2019-08-13 RX ADMIN — ACETAMINOPHEN 650 MG: 325 TABLET ORAL at 01:25

## 2019-08-13 RX ADMIN — SODIUM CHLORIDE, PRESERVATIVE FREE 3 ML: 5 INJECTION INTRAVENOUS at 20:20

## 2019-08-13 RX ADMIN — OXYCODONE HYDROCHLORIDE 20 MG: 20 TABLET, FILM COATED, EXTENDED RELEASE ORAL at 20:19

## 2019-08-13 RX ADMIN — DIPHENHYDRAMINE HYDROCHLORIDE 25 MG: 25 CAPSULE ORAL at 01:25

## 2019-08-13 RX ADMIN — ALLOPURINOL 100 MG: 100 TABLET ORAL at 20:19

## 2019-08-13 RX ADMIN — PANTOPRAZOLE SODIUM 40 MG: 40 TABLET, DELAYED RELEASE ORAL at 05:28

## 2019-08-13 RX ADMIN — FUROSEMIDE 20 MG: 10 INJECTION, SOLUTION INTRAMUSCULAR; INTRAVENOUS at 10:57

## 2019-08-13 RX ADMIN — ALLOPURINOL 100 MG: 100 TABLET ORAL at 09:27

## 2019-08-13 RX ADMIN — IPRATROPIUM BROMIDE AND ALBUTEROL SULFATE 3 ML: .5; 3 SOLUTION RESPIRATORY (INHALATION) at 18:57

## 2019-08-13 RX ADMIN — INSULIN ASPART 2 UNITS: 100 INJECTION, SOLUTION INTRAVENOUS; SUBCUTANEOUS at 11:54

## 2019-08-13 RX ADMIN — CETIRIZINE HYDROCHLORIDE 5 MG: 10 TABLET, FILM COATED ORAL at 09:28

## 2019-08-13 RX ADMIN — OXYCODONE HYDROCHLORIDE 20 MG: 20 TABLET, FILM COATED, EXTENDED RELEASE ORAL at 09:27

## 2019-08-13 RX ADMIN — ALBUTEROL SULFATE 2.5 MG: 2.5 SOLUTION RESPIRATORY (INHALATION) at 06:33

## 2019-08-13 RX ADMIN — OXYCODONE HYDROCHLORIDE 10 MG: 5 TABLET ORAL at 18:08

## 2019-08-13 RX ADMIN — CHOLECALCIFEROL TAB 10 MCG (400 UNIT) 1000 UNITS: 10 TAB at 09:28

## 2019-08-13 RX ADMIN — BUDESONIDE 0.5 MG: 0.5 INHALANT RESPIRATORY (INHALATION) at 06:33

## 2019-08-13 RX ADMIN — OXYCODONE HYDROCHLORIDE 10 MG: 5 TABLET ORAL at 10:56

## 2019-08-13 RX ADMIN — FLECAINIDE ACETATE 50 MG: 50 TABLET ORAL at 09:28

## 2019-08-13 RX ADMIN — MEROPENEM 1 G: 1 INJECTION, POWDER, FOR SOLUTION INTRAVENOUS at 10:56

## 2019-08-13 RX ADMIN — SODIUM CHLORIDE, PRESERVATIVE FREE 3 ML: 5 INJECTION INTRAVENOUS at 09:28

## 2019-08-13 RX ADMIN — APIXABAN 5 MG: 5 TABLET, FILM COATED ORAL at 20:19

## 2019-08-13 RX ADMIN — MEROPENEM 1 G: 1 INJECTION, POWDER, FOR SOLUTION INTRAVENOUS at 03:51

## 2019-08-13 RX ADMIN — ONDANSETRON 8 MG: 4 TABLET, FILM COATED ORAL at 09:27

## 2019-08-13 RX ADMIN — SODIUM CHLORIDE 50 ML/HR: 9 INJECTION, SOLUTION INTRAVENOUS at 18:01

## 2019-08-13 RX ADMIN — ONDANSETRON 8 MG: 4 TABLET, FILM COATED ORAL at 02:01

## 2019-08-13 RX ADMIN — BUDESONIDE 0.5 MG: 0.5 INHALANT RESPIRATORY (INHALATION) at 18:57

## 2019-08-13 RX ADMIN — IOVERSOL 95 ML: 678 INJECTION INTRA-ARTERIAL; INTRAVENOUS at 02:00

## 2019-08-13 RX ADMIN — FLUOXETINE 40 MG: 20 CAPSULE ORAL at 05:28

## 2019-08-13 RX ADMIN — ONDANSETRON 8 MG: 4 TABLET, FILM COATED ORAL at 18:08

## 2019-08-14 LAB
ALBUMIN SERPL-MCNC: 2.08 G/DL (ref 3.5–5.2)
ALBUMIN/GLOB SERPL: 0.7 G/DL
ALP SERPL-CCNC: 127 U/L (ref 39–117)
ALT SERPL W P-5'-P-CCNC: 6 U/L (ref 1–41)
ANION GAP SERPL CALCULATED.3IONS-SCNC: 10.4 MMOL/L (ref 5–15)
ANISOCYTOSIS BLD QL: ABNORMAL
AST SERPL-CCNC: 22 U/L (ref 1–40)
BILIRUB SERPL-MCNC: 0.3 MG/DL (ref 0.2–1.2)
BUN BLD-MCNC: 9 MG/DL (ref 8–23)
BUN/CREAT SERPL: 9.1 (ref 7–25)
C3 FRG RBC-MCNC: ABNORMAL
CALCIUM SPEC-SCNC: 8.8 MG/DL (ref 8.6–10.5)
CHLORIDE SERPL-SCNC: 100 MMOL/L (ref 98–107)
CO2 SERPL-SCNC: 25.6 MMOL/L (ref 22–29)
CREAT BLD-MCNC: 0.99 MG/DL (ref 0.76–1.27)
CRP SERPL-MCNC: 7.34 MG/DL (ref 0–0.5)
DEPRECATED RDW RBC AUTO: 54.4 FL (ref 37–54)
EOSINOPHIL # BLD MANUAL: 0.33 10*3/MM3 (ref 0–0.4)
EOSINOPHIL NFR BLD MANUAL: 3 % (ref 0.3–6.2)
ERYTHROCYTE [DISTWIDTH] IN BLOOD BY AUTOMATED COUNT: 17.5 % (ref 12.3–15.4)
GFR SERPL CREATININE-BSD FRML MDRD: 75 ML/MIN/1.73
GLOBULIN UR ELPH-MCNC: 3.1 GM/DL
GLUCOSE BLD-MCNC: 111 MG/DL (ref 65–99)
GLUCOSE BLDC GLUCOMTR-MCNC: 110 MG/DL (ref 70–130)
GLUCOSE BLDC GLUCOMTR-MCNC: 111 MG/DL (ref 70–130)
GLUCOSE BLDC GLUCOMTR-MCNC: 116 MG/DL (ref 70–130)
GLUCOSE BLDC GLUCOMTR-MCNC: 170 MG/DL (ref 70–130)
HCT VFR BLD AUTO: 25 % (ref 37.5–51)
HGB BLD-MCNC: 7.7 G/DL (ref 13–17.7)
HYPOCHROMIA BLD QL: ABNORMAL
LYMPHOCYTES # BLD MANUAL: 0.88 10*3/MM3 (ref 0.7–3.1)
LYMPHOCYTES NFR BLD MANUAL: 5 % (ref 5–12)
LYMPHOCYTES NFR BLD MANUAL: 8 % (ref 19.6–45.3)
MCH RBC QN AUTO: 27.3 PG (ref 26.6–33)
MCHC RBC AUTO-ENTMCNC: 30.8 G/DL (ref 31.5–35.7)
MCV RBC AUTO: 88.7 FL (ref 79–97)
METAMYELOCYTES NFR BLD MANUAL: 3 % (ref 0–0)
MONOCYTES # BLD AUTO: 0.55 10*3/MM3 (ref 0.1–0.9)
MYELOCYTES NFR BLD MANUAL: 1 % (ref 0–0)
NEUTROPHILS # BLD AUTO: 8.83 10*3/MM3 (ref 1.7–7)
NEUTROPHILS NFR BLD MANUAL: 77 % (ref 42.7–76)
NEUTS BAND NFR BLD MANUAL: 3 % (ref 0–5)
NRBC SPEC MANUAL: 1 /100 WBC (ref 0–0.2)
OVALOCYTES BLD QL SMEAR: ABNORMAL
PLATELET # BLD AUTO: 550 10*3/MM3 (ref 140–450)
PMV BLD AUTO: 9.4 FL (ref 6–12)
POTASSIUM BLD-SCNC: 3.9 MMOL/L (ref 3.5–5.2)
PROT SERPL-MCNC: 5.2 G/DL (ref 6–8.5)
RBC # BLD AUTO: 2.82 10*6/MM3 (ref 4.14–5.8)
SCAN SLIDE: NORMAL
SMALL PLATELETS BLD QL SMEAR: ABNORMAL
SODIUM BLD-SCNC: 136 MMOL/L (ref 136–145)
WBC NRBC COR # BLD: 11.04 10*3/MM3 (ref 3.4–10.8)

## 2019-08-14 PROCEDURE — 93010 ELECTROCARDIOGRAM REPORT: CPT | Performed by: INTERNAL MEDICINE

## 2019-08-14 PROCEDURE — 86140 C-REACTIVE PROTEIN: CPT | Performed by: NURSE PRACTITIONER

## 2019-08-14 PROCEDURE — 63710000001 INSULIN ASPART PER 5 UNITS: Performed by: NURSE PRACTITIONER

## 2019-08-14 PROCEDURE — 93005 ELECTROCARDIOGRAM TRACING: CPT | Performed by: PHYSICIAN ASSISTANT

## 2019-08-14 PROCEDURE — 82962 GLUCOSE BLOOD TEST: CPT

## 2019-08-14 PROCEDURE — 94799 UNLISTED PULMONARY SVC/PX: CPT

## 2019-08-14 PROCEDURE — 63710000001 PROCHLORPERAZINE MALEATE PER 10 MG: Performed by: HOSPITALIST

## 2019-08-14 PROCEDURE — 25010000002 MORPHINE PER 10 MG: Performed by: INTERNAL MEDICINE

## 2019-08-14 PROCEDURE — 85007 BL SMEAR W/DIFF WBC COUNT: CPT | Performed by: INTERNAL MEDICINE

## 2019-08-14 PROCEDURE — 97530 THERAPEUTIC ACTIVITIES: CPT

## 2019-08-14 PROCEDURE — 97163 PT EVAL HIGH COMPLEX 45 MIN: CPT

## 2019-08-14 PROCEDURE — 85025 COMPLETE CBC W/AUTO DIFF WBC: CPT | Performed by: INTERNAL MEDICINE

## 2019-08-14 PROCEDURE — 25010000002 MEROPENEM: Performed by: NURSE PRACTITIONER

## 2019-08-14 PROCEDURE — 97116 GAIT TRAINING THERAPY: CPT

## 2019-08-14 PROCEDURE — 80053 COMPREHEN METABOLIC PANEL: CPT | Performed by: INTERNAL MEDICINE

## 2019-08-14 PROCEDURE — 25010000002 FUROSEMIDE PER 20 MG: Performed by: INTERNAL MEDICINE

## 2019-08-14 PROCEDURE — 25010000003 MICAFUNGIN SODIUM 100 MG RECONSTITUTED SOLUTION 1 EACH VIAL: Performed by: NURSE PRACTITIONER

## 2019-08-14 PROCEDURE — 99233 SBSQ HOSP IP/OBS HIGH 50: CPT | Performed by: INTERNAL MEDICINE

## 2019-08-14 RX ORDER — FUROSEMIDE 10 MG/ML
20 INJECTION INTRAMUSCULAR; INTRAVENOUS ONCE
Status: COMPLETED | OUTPATIENT
Start: 2019-08-14 | End: 2019-08-14

## 2019-08-14 RX ORDER — AMOXICILLIN 250 MG
2 CAPSULE ORAL 2 TIMES DAILY PRN
Status: DISCONTINUED | OUTPATIENT
Start: 2019-08-14 | End: 2019-08-18

## 2019-08-14 RX ADMIN — IPRATROPIUM BROMIDE AND ALBUTEROL SULFATE 3 ML: .5; 3 SOLUTION RESPIRATORY (INHALATION) at 07:36

## 2019-08-14 RX ADMIN — ONDANSETRON 8 MG: 4 TABLET, FILM COATED ORAL at 21:36

## 2019-08-14 RX ADMIN — OXYCODONE HYDROCHLORIDE 20 MG: 20 TABLET, FILM COATED, EXTENDED RELEASE ORAL at 21:36

## 2019-08-14 RX ADMIN — PANTOPRAZOLE SODIUM 40 MG: 40 TABLET, DELAYED RELEASE ORAL at 05:50

## 2019-08-14 RX ADMIN — ALLOPURINOL 100 MG: 100 TABLET ORAL at 21:36

## 2019-08-14 RX ADMIN — SODIUM CHLORIDE 50 ML/HR: 9 INJECTION, SOLUTION INTRAVENOUS at 15:13

## 2019-08-14 RX ADMIN — BUDESONIDE 0.5 MG: 0.5 INHALANT RESPIRATORY (INHALATION) at 07:37

## 2019-08-14 RX ADMIN — FLUOXETINE 40 MG: 20 CAPSULE ORAL at 05:50

## 2019-08-14 RX ADMIN — IPRATROPIUM BROMIDE AND ALBUTEROL SULFATE 3 ML: .5; 3 SOLUTION RESPIRATORY (INHALATION) at 01:04

## 2019-08-14 RX ADMIN — MICAFUNGIN SODIUM 100 MG: 20 INJECTION, POWDER, LYOPHILIZED, FOR SOLUTION INTRAVENOUS at 15:13

## 2019-08-14 RX ADMIN — SODIUM CHLORIDE, PRESERVATIVE FREE 3 ML: 5 INJECTION INTRAVENOUS at 08:28

## 2019-08-14 RX ADMIN — FLECAINIDE ACETATE 50 MG: 50 TABLET ORAL at 21:36

## 2019-08-14 RX ADMIN — MEROPENEM 1 G: 1 INJECTION, POWDER, FOR SOLUTION INTRAVENOUS at 11:51

## 2019-08-14 RX ADMIN — IPRATROPIUM BROMIDE 0.5 MG: 0.5 SOLUTION RESPIRATORY (INHALATION) at 13:48

## 2019-08-14 RX ADMIN — FLECAINIDE ACETATE 50 MG: 50 TABLET ORAL at 08:29

## 2019-08-14 RX ADMIN — OXYCODONE HYDROCHLORIDE 20 MG: 20 TABLET, FILM COATED, EXTENDED RELEASE ORAL at 08:26

## 2019-08-14 RX ADMIN — MONTELUKAST SODIUM 10 MG: 10 TABLET, COATED ORAL at 21:36

## 2019-08-14 RX ADMIN — APIXABAN 5 MG: 5 TABLET, FILM COATED ORAL at 21:36

## 2019-08-14 RX ADMIN — MEROPENEM 1 G: 1 INJECTION, POWDER, FOR SOLUTION INTRAVENOUS at 03:30

## 2019-08-14 RX ADMIN — PROCHLORPERAZINE MALEATE 10 MG: 10 TABLET ORAL at 08:26

## 2019-08-14 RX ADMIN — MEROPENEM 1 G: 1 INJECTION, POWDER, FOR SOLUTION INTRAVENOUS at 18:11

## 2019-08-14 RX ADMIN — ALLOPURINOL 100 MG: 100 TABLET ORAL at 08:27

## 2019-08-14 RX ADMIN — INSULIN ASPART 2 UNITS: 100 INJECTION, SOLUTION INTRAVENOUS; SUBCUTANEOUS at 17:45

## 2019-08-14 RX ADMIN — BUDESONIDE 0.5 MG: 0.5 INHALANT RESPIRATORY (INHALATION) at 19:16

## 2019-08-14 RX ADMIN — APIXABAN 5 MG: 5 TABLET, FILM COATED ORAL at 08:27

## 2019-08-14 RX ADMIN — SODIUM CHLORIDE, PRESERVATIVE FREE 3 ML: 5 INJECTION INTRAVENOUS at 21:37

## 2019-08-14 RX ADMIN — MORPHINE SULFATE 2 MG: 2 INJECTION, SOLUTION INTRAMUSCULAR; INTRAVENOUS at 06:51

## 2019-08-14 RX ADMIN — FUROSEMIDE 20 MG: 10 INJECTION, SOLUTION INTRAVENOUS at 11:51

## 2019-08-14 RX ADMIN — CETIRIZINE HYDROCHLORIDE 5 MG: 10 TABLET, FILM COATED ORAL at 08:27

## 2019-08-14 RX ADMIN — ONDANSETRON 8 MG: 4 TABLET, FILM COATED ORAL at 06:51

## 2019-08-14 RX ADMIN — CHOLECALCIFEROL TAB 10 MCG (400 UNIT) 1000 UNITS: 10 TAB at 08:27

## 2019-08-14 RX ADMIN — IPRATROPIUM BROMIDE 0.5 MG: 0.5 SOLUTION RESPIRATORY (INHALATION) at 19:15

## 2019-08-15 LAB
ALBUMIN SERPL-MCNC: 2.25 G/DL (ref 3.5–5.2)
ALBUMIN/GLOB SERPL: 0.7 G/DL
ALP SERPL-CCNC: 133 U/L (ref 39–117)
ALT SERPL W P-5'-P-CCNC: 6 U/L (ref 1–41)
ANION GAP SERPL CALCULATED.3IONS-SCNC: 10.2 MMOL/L (ref 5–15)
ANISOCYTOSIS BLD QL: ABNORMAL
AST SERPL-CCNC: 23 U/L (ref 1–40)
BASOPHILS # BLD MANUAL: 0.12 10*3/MM3 (ref 0–0.2)
BASOPHILS NFR BLD AUTO: 1 % (ref 0–1.5)
BILIRUB SERPL-MCNC: 0.3 MG/DL (ref 0.2–1.2)
BUN BLD-MCNC: 10 MG/DL (ref 8–23)
BUN/CREAT SERPL: 9.3 (ref 7–25)
CALCIUM SPEC-SCNC: 9.3 MG/DL (ref 8.6–10.5)
CHLORIDE SERPL-SCNC: 99 MMOL/L (ref 98–107)
CO2 SERPL-SCNC: 28.8 MMOL/L (ref 22–29)
CREAT BLD-MCNC: 1.07 MG/DL (ref 0.76–1.27)
CRP SERPL-MCNC: 5.19 MG/DL (ref 0–0.5)
DACRYOCYTES BLD QL SMEAR: ABNORMAL
DEPRECATED RDW RBC AUTO: 55.4 FL (ref 37–54)
EOSINOPHIL # BLD MANUAL: 0.23 10*3/MM3 (ref 0–0.4)
EOSINOPHIL NFR BLD MANUAL: 2 % (ref 0.3–6.2)
ERYTHROCYTE [DISTWIDTH] IN BLOOD BY AUTOMATED COUNT: 17.9 % (ref 12.3–15.4)
FERRITIN SERPL-MCNC: 540.4 NG/ML (ref 30–400)
GFR SERPL CREATININE-BSD FRML MDRD: 68 ML/MIN/1.73
GLOBULIN UR ELPH-MCNC: 3.2 GM/DL
GLUCOSE BLD-MCNC: 107 MG/DL (ref 65–99)
GLUCOSE BLDC GLUCOMTR-MCNC: 112 MG/DL (ref 70–130)
GLUCOSE BLDC GLUCOMTR-MCNC: 130 MG/DL (ref 70–130)
GLUCOSE BLDC GLUCOMTR-MCNC: 155 MG/DL (ref 70–130)
GLUCOSE BLDC GLUCOMTR-MCNC: 197 MG/DL (ref 70–130)
HCT VFR BLD AUTO: 25.2 % (ref 37.5–51)
HGB BLD-MCNC: 7.9 G/DL (ref 13–17.7)
HYPOCHROMIA BLD QL: ABNORMAL
IRON 24H UR-MRATE: 15 MCG/DL (ref 59–158)
IRON SATN MFR SERPL: 12 % (ref 20–50)
LYMPHOCYTES # BLD MANUAL: 1.05 10*3/MM3 (ref 0.7–3.1)
LYMPHOCYTES NFR BLD MANUAL: 7 % (ref 5–12)
LYMPHOCYTES NFR BLD MANUAL: 9 % (ref 19.6–45.3)
MAGNESIUM SERPL-MCNC: 1.6 MG/DL (ref 1.6–2.4)
MCH RBC QN AUTO: 27.1 PG (ref 26.6–33)
MCHC RBC AUTO-ENTMCNC: 31.3 G/DL (ref 31.5–35.7)
MCV RBC AUTO: 86.6 FL (ref 79–97)
METAMYELOCYTES NFR BLD MANUAL: 2 % (ref 0–0)
MONOCYTES # BLD AUTO: 0.82 10*3/MM3 (ref 0.1–0.9)
MYELOCYTES NFR BLD MANUAL: 2 % (ref 0–0)
NEUTROPHILS # BLD AUTO: 8.98 10*3/MM3 (ref 1.7–7)
NEUTROPHILS NFR BLD MANUAL: 71 % (ref 42.7–76)
NEUTS BAND NFR BLD MANUAL: 6 % (ref 0–5)
OVALOCYTES BLD QL SMEAR: ABNORMAL
PLATELET # BLD AUTO: 530 10*3/MM3 (ref 140–450)
PMV BLD AUTO: 9.6 FL (ref 6–12)
POLYCHROMASIA BLD QL SMEAR: ABNORMAL
POTASSIUM BLD-SCNC: 4.1 MMOL/L (ref 3.5–5.2)
PROT SERPL-MCNC: 5.4 G/DL (ref 6–8.5)
RBC # BLD AUTO: 2.91 10*6/MM3 (ref 4.14–5.8)
SCAN SLIDE: NORMAL
SCHISTOCYTES BLD QL SMEAR: ABNORMAL
SMALL PLATELETS BLD QL SMEAR: ABNORMAL
SODIUM BLD-SCNC: 138 MMOL/L (ref 136–145)
TIBC SERPL-MCNC: 125 MCG/DL (ref 298–536)
TRANSFERRIN SERPL-MCNC: 84 MG/DL (ref 200–360)
WBC NRBC COR # BLD: 11.66 10*3/MM3 (ref 3.4–10.8)

## 2019-08-15 PROCEDURE — 82728 ASSAY OF FERRITIN: CPT | Performed by: INTERNAL MEDICINE

## 2019-08-15 PROCEDURE — 25010000002 MORPHINE PER 10 MG: Performed by: INTERNAL MEDICINE

## 2019-08-15 PROCEDURE — 25010000002 MEROPENEM: Performed by: NURSE PRACTITIONER

## 2019-08-15 PROCEDURE — 94799 UNLISTED PULMONARY SVC/PX: CPT

## 2019-08-15 PROCEDURE — 63710000001 INSULIN ASPART PER 5 UNITS: Performed by: NURSE PRACTITIONER

## 2019-08-15 PROCEDURE — 97110 THERAPEUTIC EXERCISES: CPT

## 2019-08-15 PROCEDURE — 99233 SBSQ HOSP IP/OBS HIGH 50: CPT | Performed by: INTERNAL MEDICINE

## 2019-08-15 PROCEDURE — 85007 BL SMEAR W/DIFF WBC COUNT: CPT | Performed by: INTERNAL MEDICINE

## 2019-08-15 PROCEDURE — 83540 ASSAY OF IRON: CPT | Performed by: INTERNAL MEDICINE

## 2019-08-15 PROCEDURE — 80053 COMPREHEN METABOLIC PANEL: CPT | Performed by: INTERNAL MEDICINE

## 2019-08-15 PROCEDURE — 25010000002 FUROSEMIDE PER 20 MG: Performed by: INTERNAL MEDICINE

## 2019-08-15 PROCEDURE — 97116 GAIT TRAINING THERAPY: CPT

## 2019-08-15 PROCEDURE — 83735 ASSAY OF MAGNESIUM: CPT | Performed by: INTERNAL MEDICINE

## 2019-08-15 PROCEDURE — 86140 C-REACTIVE PROTEIN: CPT | Performed by: NURSE PRACTITIONER

## 2019-08-15 PROCEDURE — 25010000003 MICAFUNGIN SODIUM 100 MG RECONSTITUTED SOLUTION 1 EACH VIAL: Performed by: NURSE PRACTITIONER

## 2019-08-15 PROCEDURE — 82962 GLUCOSE BLOOD TEST: CPT

## 2019-08-15 PROCEDURE — 84466 ASSAY OF TRANSFERRIN: CPT | Performed by: INTERNAL MEDICINE

## 2019-08-15 PROCEDURE — 99232 SBSQ HOSP IP/OBS MODERATE 35: CPT | Performed by: INTERNAL MEDICINE

## 2019-08-15 PROCEDURE — 85025 COMPLETE CBC W/AUTO DIFF WBC: CPT | Performed by: INTERNAL MEDICINE

## 2019-08-15 RX ORDER — MAGNESIUM SULFATE HEPTAHYDRATE 40 MG/ML
4 INJECTION, SOLUTION INTRAVENOUS ONCE
Status: COMPLETED | OUTPATIENT
Start: 2019-08-15 | End: 2019-08-15

## 2019-08-15 RX ORDER — FUROSEMIDE 10 MG/ML
40 INJECTION INTRAMUSCULAR; INTRAVENOUS ONCE
Status: COMPLETED | OUTPATIENT
Start: 2019-08-15 | End: 2019-08-15

## 2019-08-15 RX ADMIN — BUDESONIDE 0.5 MG: 0.5 INHALANT RESPIRATORY (INHALATION) at 07:40

## 2019-08-15 RX ADMIN — FUROSEMIDE 40 MG: 10 INJECTION, SOLUTION INTRAMUSCULAR; INTRAVENOUS at 12:15

## 2019-08-15 RX ADMIN — BUDESONIDE 0.5 MG: 0.5 INHALANT RESPIRATORY (INHALATION) at 18:48

## 2019-08-15 RX ADMIN — SODIUM CHLORIDE, PRESERVATIVE FREE 3 ML: 5 INJECTION INTRAVENOUS at 08:58

## 2019-08-15 RX ADMIN — CHOLECALCIFEROL TAB 10 MCG (400 UNIT) 1000 UNITS: 10 TAB at 08:58

## 2019-08-15 RX ADMIN — MEROPENEM 1 G: 1 INJECTION, POWDER, FOR SOLUTION INTRAVENOUS at 03:21

## 2019-08-15 RX ADMIN — SODIUM CHLORIDE, PRESERVATIVE FREE 3 ML: 5 INJECTION INTRAVENOUS at 21:43

## 2019-08-15 RX ADMIN — IPRATROPIUM BROMIDE 0.5 MG: 0.5 SOLUTION RESPIRATORY (INHALATION) at 18:48

## 2019-08-15 RX ADMIN — INSULIN ASPART 2 UNITS: 100 INJECTION, SOLUTION INTRAVENOUS; SUBCUTANEOUS at 16:52

## 2019-08-15 RX ADMIN — FLECAINIDE ACETATE 50 MG: 50 TABLET ORAL at 21:42

## 2019-08-15 RX ADMIN — FLUOXETINE 40 MG: 20 CAPSULE ORAL at 05:55

## 2019-08-15 RX ADMIN — APIXABAN 5 MG: 5 TABLET, FILM COATED ORAL at 21:42

## 2019-08-15 RX ADMIN — ONDANSETRON 8 MG: 4 TABLET, FILM COATED ORAL at 05:55

## 2019-08-15 RX ADMIN — PANTOPRAZOLE SODIUM 40 MG: 40 TABLET, DELAYED RELEASE ORAL at 05:55

## 2019-08-15 RX ADMIN — MEROPENEM 1 G: 1 INJECTION, POWDER, FOR SOLUTION INTRAVENOUS at 18:03

## 2019-08-15 RX ADMIN — MICAFUNGIN SODIUM 100 MG: 20 INJECTION, POWDER, LYOPHILIZED, FOR SOLUTION INTRAVENOUS at 12:15

## 2019-08-15 RX ADMIN — MONTELUKAST SODIUM 10 MG: 10 TABLET, COATED ORAL at 21:42

## 2019-08-15 RX ADMIN — MAGNESIUM SULFATE HEPTAHYDRATE 4 G: 40 INJECTION, SOLUTION INTRAVENOUS at 08:57

## 2019-08-15 RX ADMIN — ALLOPURINOL 100 MG: 100 TABLET ORAL at 21:42

## 2019-08-15 RX ADMIN — IPRATROPIUM BROMIDE 0.5 MG: 0.5 SOLUTION RESPIRATORY (INHALATION) at 13:32

## 2019-08-15 RX ADMIN — APIXABAN 5 MG: 5 TABLET, FILM COATED ORAL at 08:58

## 2019-08-15 RX ADMIN — OXYCODONE HYDROCHLORIDE 20 MG: 20 TABLET, FILM COATED, EXTENDED RELEASE ORAL at 08:59

## 2019-08-15 RX ADMIN — CETIRIZINE HYDROCHLORIDE 5 MG: 10 TABLET, FILM COATED ORAL at 08:58

## 2019-08-15 RX ADMIN — ONDANSETRON 8 MG: 4 TABLET, FILM COATED ORAL at 21:42

## 2019-08-15 RX ADMIN — ALLOPURINOL 100 MG: 100 TABLET ORAL at 08:58

## 2019-08-15 RX ADMIN — MORPHINE SULFATE 2 MG: 2 INJECTION, SOLUTION INTRAMUSCULAR; INTRAVENOUS at 05:55

## 2019-08-15 RX ADMIN — OXYCODONE HYDROCHLORIDE 20 MG: 20 TABLET, FILM COATED, EXTENDED RELEASE ORAL at 21:43

## 2019-08-15 RX ADMIN — FLECAINIDE ACETATE 50 MG: 50 TABLET ORAL at 08:58

## 2019-08-16 LAB
ALBUMIN SERPL-MCNC: 2.29 G/DL (ref 3.5–5.2)
ALBUMIN/GLOB SERPL: 0.7 G/DL
ALP SERPL-CCNC: 130 U/L (ref 39–117)
ALT SERPL W P-5'-P-CCNC: 7 U/L (ref 1–41)
ANION GAP SERPL CALCULATED.3IONS-SCNC: 10.7 MMOL/L (ref 5–15)
ANISOCYTOSIS BLD QL: ABNORMAL
AST SERPL-CCNC: 23 U/L (ref 1–40)
BILIRUB SERPL-MCNC: 0.3 MG/DL (ref 0.2–1.2)
BUN BLD-MCNC: 13 MG/DL (ref 8–23)
BUN/CREAT SERPL: 11.5 (ref 7–25)
CALCIUM SPEC-SCNC: 9.2 MG/DL (ref 8.6–10.5)
CHLORIDE SERPL-SCNC: 98 MMOL/L (ref 98–107)
CO2 SERPL-SCNC: 28.3 MMOL/L (ref 22–29)
CREAT BLD-MCNC: 1.13 MG/DL (ref 0.76–1.27)
CRP SERPL-MCNC: 4.15 MG/DL (ref 0–0.5)
DACRYOCYTES BLD QL SMEAR: ABNORMAL
DEPRECATED RDW RBC AUTO: 56.3 FL (ref 37–54)
EOSINOPHIL # BLD MANUAL: 0.61 10*3/MM3 (ref 0–0.4)
EOSINOPHIL NFR BLD MANUAL: 5 % (ref 0.3–6.2)
ERYTHROCYTE [DISTWIDTH] IN BLOOD BY AUTOMATED COUNT: 18 % (ref 12.3–15.4)
GFR SERPL CREATININE-BSD FRML MDRD: 64 ML/MIN/1.73
GLOBULIN UR ELPH-MCNC: 3.2 GM/DL
GLUCOSE BLD-MCNC: 127 MG/DL (ref 65–99)
GLUCOSE BLDC GLUCOMTR-MCNC: 113 MG/DL (ref 70–130)
GLUCOSE BLDC GLUCOMTR-MCNC: 151 MG/DL (ref 70–130)
GLUCOSE BLDC GLUCOMTR-MCNC: 159 MG/DL (ref 70–130)
GLUCOSE BLDC GLUCOMTR-MCNC: 188 MG/DL (ref 70–130)
HCT VFR BLD AUTO: 26.4 % (ref 37.5–51)
HGB BLD-MCNC: 7.9 G/DL (ref 13–17.7)
HYPOCHROMIA BLD QL: ABNORMAL
LYMPHOCYTES # BLD MANUAL: 1.7 10*3/MM3 (ref 0.7–3.1)
LYMPHOCYTES NFR BLD MANUAL: 14 % (ref 19.6–45.3)
LYMPHOCYTES NFR BLD MANUAL: 5 % (ref 5–12)
MAGNESIUM SERPL-MCNC: 2.3 MG/DL (ref 1.6–2.4)
MCH RBC QN AUTO: 26.7 PG (ref 26.6–33)
MCHC RBC AUTO-ENTMCNC: 29.9 G/DL (ref 31.5–35.7)
MCV RBC AUTO: 89.2 FL (ref 79–97)
METAMYELOCYTES NFR BLD MANUAL: 1 % (ref 0–0)
MONOCYTES # BLD AUTO: 0.61 10*3/MM3 (ref 0.1–0.9)
NEUTROPHILS # BLD AUTO: 9.1 10*3/MM3 (ref 1.7–7)
NEUTROPHILS NFR BLD MANUAL: 73 % (ref 42.7–76)
NEUTS BAND NFR BLD MANUAL: 2 % (ref 0–5)
OVALOCYTES BLD QL SMEAR: ABNORMAL
PLATELET # BLD AUTO: 606 10*3/MM3 (ref 140–450)
PMV BLD AUTO: 9 FL (ref 6–12)
POLYCHROMASIA BLD QL SMEAR: ABNORMAL
POTASSIUM BLD-SCNC: 4.1 MMOL/L (ref 3.5–5.2)
PROT SERPL-MCNC: 5.5 G/DL (ref 6–8.5)
RBC # BLD AUTO: 2.96 10*6/MM3 (ref 4.14–5.8)
SCAN SLIDE: NORMAL
SMALL PLATELETS BLD QL SMEAR: ABNORMAL
SODIUM BLD-SCNC: 137 MMOL/L (ref 136–145)
WBC NRBC COR # BLD: 12.13 10*3/MM3 (ref 3.4–10.8)

## 2019-08-16 PROCEDURE — 86140 C-REACTIVE PROTEIN: CPT | Performed by: NURSE PRACTITIONER

## 2019-08-16 PROCEDURE — 25010000002 MEROPENEM: Performed by: NURSE PRACTITIONER

## 2019-08-16 PROCEDURE — 25010000002 FUROSEMIDE PER 20 MG: Performed by: INTERNAL MEDICINE

## 2019-08-16 PROCEDURE — 85025 COMPLETE CBC W/AUTO DIFF WBC: CPT | Performed by: INTERNAL MEDICINE

## 2019-08-16 PROCEDURE — 80053 COMPREHEN METABOLIC PANEL: CPT | Performed by: INTERNAL MEDICINE

## 2019-08-16 PROCEDURE — 94799 UNLISTED PULMONARY SVC/PX: CPT

## 2019-08-16 PROCEDURE — 85007 BL SMEAR W/DIFF WBC COUNT: CPT | Performed by: INTERNAL MEDICINE

## 2019-08-16 PROCEDURE — 97530 THERAPEUTIC ACTIVITIES: CPT | Performed by: PHYSICAL THERAPIST

## 2019-08-16 PROCEDURE — 82962 GLUCOSE BLOOD TEST: CPT

## 2019-08-16 PROCEDURE — 63710000001 PROCHLORPERAZINE MALEATE PER 10 MG: Performed by: HOSPITALIST

## 2019-08-16 PROCEDURE — 99233 SBSQ HOSP IP/OBS HIGH 50: CPT | Performed by: INTERNAL MEDICINE

## 2019-08-16 PROCEDURE — 63710000001 INSULIN ASPART PER 5 UNITS: Performed by: NURSE PRACTITIONER

## 2019-08-16 PROCEDURE — 83735 ASSAY OF MAGNESIUM: CPT | Performed by: INTERNAL MEDICINE

## 2019-08-16 PROCEDURE — 25010000002 MORPHINE PER 10 MG: Performed by: INTERNAL MEDICINE

## 2019-08-16 PROCEDURE — 63710000001 DRONABINOL PER 2.5 MG: Performed by: INTERNAL MEDICINE

## 2019-08-16 RX ORDER — FUROSEMIDE 10 MG/ML
40 INJECTION INTRAMUSCULAR; INTRAVENOUS ONCE
Status: COMPLETED | OUTPATIENT
Start: 2019-08-16 | End: 2019-08-16

## 2019-08-16 RX ORDER — AMOXICILLIN AND CLAVULANATE POTASSIUM 500; 125 MG/1; MG/1
1 TABLET, FILM COATED ORAL EVERY 12 HOURS SCHEDULED
Status: COMPLETED | OUTPATIENT
Start: 2019-08-16 | End: 2019-08-20

## 2019-08-16 RX ORDER — DRONABINOL 2.5 MG/1
2.5 CAPSULE ORAL
Status: DISCONTINUED | OUTPATIENT
Start: 2019-08-16 | End: 2019-08-22

## 2019-08-16 RX ORDER — ONDANSETRON 4 MG/1
8 TABLET, FILM COATED ORAL
Status: DISCONTINUED | OUTPATIENT
Start: 2019-08-16 | End: 2019-09-05 | Stop reason: HOSPADM

## 2019-08-16 RX ADMIN — IPRATROPIUM BROMIDE 0.5 MG: 0.5 SOLUTION RESPIRATORY (INHALATION) at 12:47

## 2019-08-16 RX ADMIN — PANTOPRAZOLE SODIUM 40 MG: 40 TABLET, DELAYED RELEASE ORAL at 05:20

## 2019-08-16 RX ADMIN — CETIRIZINE HYDROCHLORIDE 5 MG: 10 TABLET, FILM COATED ORAL at 09:02

## 2019-08-16 RX ADMIN — DRONABINOL 2.5 MG: 2.5 CAPSULE ORAL at 17:22

## 2019-08-16 RX ADMIN — APIXABAN 5 MG: 5 TABLET, FILM COATED ORAL at 20:35

## 2019-08-16 RX ADMIN — PROCHLORPERAZINE MALEATE 10 MG: 10 TABLET ORAL at 09:54

## 2019-08-16 RX ADMIN — SODIUM CHLORIDE, PRESERVATIVE FREE 3 ML: 5 INJECTION INTRAVENOUS at 09:02

## 2019-08-16 RX ADMIN — ONDANSETRON 8 MG: 4 TABLET, FILM COATED ORAL at 05:20

## 2019-08-16 RX ADMIN — FUROSEMIDE 40 MG: 10 INJECTION, SOLUTION INTRAMUSCULAR; INTRAVENOUS at 12:46

## 2019-08-16 RX ADMIN — AMOXICILLIN AND CLAVULANATE POTASSIUM 500 MG: 500; 125 TABLET, FILM COATED ORAL at 12:45

## 2019-08-16 RX ADMIN — ONDANSETRON 8 MG: 4 TABLET, FILM COATED ORAL at 17:23

## 2019-08-16 RX ADMIN — ONDANSETRON 8 MG: 4 TABLET, FILM COATED ORAL at 12:45

## 2019-08-16 RX ADMIN — OXYCODONE HYDROCHLORIDE 20 MG: 20 TABLET, FILM COATED, EXTENDED RELEASE ORAL at 20:34

## 2019-08-16 RX ADMIN — INSULIN ASPART 2 UNITS: 100 INJECTION, SOLUTION INTRAVENOUS; SUBCUTANEOUS at 20:35

## 2019-08-16 RX ADMIN — DRONABINOL 2.5 MG: 2.5 CAPSULE ORAL at 12:46

## 2019-08-16 RX ADMIN — ALLOPURINOL 100 MG: 100 TABLET ORAL at 09:02

## 2019-08-16 RX ADMIN — FLECAINIDE ACETATE 50 MG: 50 TABLET ORAL at 09:02

## 2019-08-16 RX ADMIN — MEROPENEM 1 G: 1 INJECTION, POWDER, FOR SOLUTION INTRAVENOUS at 03:55

## 2019-08-16 RX ADMIN — MONTELUKAST SODIUM 10 MG: 10 TABLET, COATED ORAL at 20:35

## 2019-08-16 RX ADMIN — ALLOPURINOL 100 MG: 100 TABLET ORAL at 20:35

## 2019-08-16 RX ADMIN — SODIUM CHLORIDE, PRESERVATIVE FREE 3 ML: 5 INJECTION INTRAVENOUS at 20:35

## 2019-08-16 RX ADMIN — CHOLECALCIFEROL TAB 10 MCG (400 UNIT) 1000 UNITS: 10 TAB at 09:02

## 2019-08-16 RX ADMIN — IPRATROPIUM BROMIDE 0.5 MG: 0.5 SOLUTION RESPIRATORY (INHALATION) at 19:13

## 2019-08-16 RX ADMIN — FLUOXETINE 40 MG: 20 CAPSULE ORAL at 05:20

## 2019-08-16 RX ADMIN — BUDESONIDE 0.5 MG: 0.5 INHALANT RESPIRATORY (INHALATION) at 06:36

## 2019-08-16 RX ADMIN — AMOXICILLIN AND CLAVULANATE POTASSIUM 500 MG: 500; 125 TABLET, FILM COATED ORAL at 20:35

## 2019-08-16 RX ADMIN — MORPHINE SULFATE 2 MG: 2 INJECTION, SOLUTION INTRAMUSCULAR; INTRAVENOUS at 05:20

## 2019-08-16 RX ADMIN — APIXABAN 5 MG: 5 TABLET, FILM COATED ORAL at 09:02

## 2019-08-16 RX ADMIN — BUDESONIDE 0.5 MG: 0.5 INHALANT RESPIRATORY (INHALATION) at 19:13

## 2019-08-16 RX ADMIN — OXYCODONE HYDROCHLORIDE 20 MG: 20 TABLET, FILM COATED, EXTENDED RELEASE ORAL at 09:03

## 2019-08-16 RX ADMIN — IPRATROPIUM BROMIDE 0.5 MG: 0.5 SOLUTION RESPIRATORY (INHALATION) at 06:35

## 2019-08-17 LAB
ALBUMIN SERPL-MCNC: 2.4 G/DL (ref 3.5–5.2)
ALBUMIN/GLOB SERPL: 0.7 G/DL
ALP SERPL-CCNC: 136 U/L (ref 39–117)
ALT SERPL W P-5'-P-CCNC: 6 U/L (ref 1–41)
ANION GAP SERPL CALCULATED.3IONS-SCNC: 9.8 MMOL/L (ref 5–15)
ANISOCYTOSIS BLD QL: ABNORMAL
AST SERPL-CCNC: 23 U/L (ref 1–40)
BACTERIA SPEC AEROBE CULT: NORMAL
BACTERIA SPEC AEROBE CULT: NORMAL
BILIRUB SERPL-MCNC: 0.3 MG/DL (ref 0.2–1.2)
BUN BLD-MCNC: 16 MG/DL (ref 8–23)
BUN/CREAT SERPL: 11.8 (ref 7–25)
C3 FRG RBC-MCNC: ABNORMAL
CALCIUM SPEC-SCNC: 9.8 MG/DL (ref 8.6–10.5)
CHLORIDE SERPL-SCNC: 96 MMOL/L (ref 98–107)
CO2 SERPL-SCNC: 28.2 MMOL/L (ref 22–29)
CREAT BLD-MCNC: 1.36 MG/DL (ref 0.76–1.27)
CRP SERPL-MCNC: 2.92 MG/DL (ref 0–0.5)
DACRYOCYTES BLD QL SMEAR: ABNORMAL
DEPRECATED RDW RBC AUTO: 56.4 FL (ref 37–54)
EOSINOPHIL # BLD MANUAL: 0.27 10*3/MM3 (ref 0–0.4)
EOSINOPHIL NFR BLD MANUAL: 2 % (ref 0.3–6.2)
ERYTHROCYTE [DISTWIDTH] IN BLOOD BY AUTOMATED COUNT: 18 % (ref 12.3–15.4)
GFR SERPL CREATININE-BSD FRML MDRD: 52 ML/MIN/1.73
GLOBULIN UR ELPH-MCNC: 3.6 GM/DL
GLUCOSE BLD-MCNC: 107 MG/DL (ref 65–99)
GLUCOSE BLDC GLUCOMTR-MCNC: 100 MG/DL (ref 70–130)
GLUCOSE BLDC GLUCOMTR-MCNC: 144 MG/DL (ref 70–130)
GLUCOSE BLDC GLUCOMTR-MCNC: 146 MG/DL (ref 70–130)
GLUCOSE BLDC GLUCOMTR-MCNC: 181 MG/DL (ref 70–130)
HCT VFR BLD AUTO: 29 % (ref 37.5–51)
HGB BLD-MCNC: 8.5 G/DL (ref 13–17.7)
HYPOCHROMIA BLD QL: ABNORMAL
LYMPHOCYTES # BLD MANUAL: 2.18 10*3/MM3 (ref 0.7–3.1)
LYMPHOCYTES NFR BLD MANUAL: 16 % (ref 19.6–45.3)
LYMPHOCYTES NFR BLD MANUAL: 4 % (ref 5–12)
MCH RBC QN AUTO: 26.4 PG (ref 26.6–33)
MCHC RBC AUTO-ENTMCNC: 29.3 G/DL (ref 31.5–35.7)
MCV RBC AUTO: 90.1 FL (ref 79–97)
METAMYELOCYTES NFR BLD MANUAL: 6 % (ref 0–0)
MONOCYTES # BLD AUTO: 0.55 10*3/MM3 (ref 0.1–0.9)
MYELOCYTES NFR BLD MANUAL: 1 % (ref 0–0)
NEUTROPHILS # BLD AUTO: 9.69 10*3/MM3 (ref 1.7–7)
NEUTROPHILS NFR BLD MANUAL: 71 % (ref 42.7–76)
OVALOCYTES BLD QL SMEAR: ABNORMAL
PLATELET # BLD AUTO: 662 10*3/MM3 (ref 140–450)
PMV BLD AUTO: 9.2 FL (ref 6–12)
POTASSIUM BLD-SCNC: 4.3 MMOL/L (ref 3.5–5.2)
PROT SERPL-MCNC: 6 G/DL (ref 6–8.5)
RBC # BLD AUTO: 3.22 10*6/MM3 (ref 4.14–5.8)
SCAN SLIDE: NORMAL
SMALL PLATELETS BLD QL SMEAR: ABNORMAL
SODIUM BLD-SCNC: 134 MMOL/L (ref 136–145)
WBC NRBC COR # BLD: 13.65 10*3/MM3 (ref 3.4–10.8)

## 2019-08-17 PROCEDURE — 85025 COMPLETE CBC W/AUTO DIFF WBC: CPT | Performed by: INTERNAL MEDICINE

## 2019-08-17 PROCEDURE — 99232 SBSQ HOSP IP/OBS MODERATE 35: CPT | Performed by: INTERNAL MEDICINE

## 2019-08-17 PROCEDURE — 63710000001 DRONABINOL PER 2.5 MG: Performed by: INTERNAL MEDICINE

## 2019-08-17 PROCEDURE — 63710000001 INSULIN ASPART PER 5 UNITS: Performed by: NURSE PRACTITIONER

## 2019-08-17 PROCEDURE — 80053 COMPREHEN METABOLIC PANEL: CPT | Performed by: INTERNAL MEDICINE

## 2019-08-17 PROCEDURE — 94799 UNLISTED PULMONARY SVC/PX: CPT

## 2019-08-17 PROCEDURE — 86140 C-REACTIVE PROTEIN: CPT | Performed by: NURSE PRACTITIONER

## 2019-08-17 PROCEDURE — 85007 BL SMEAR W/DIFF WBC COUNT: CPT | Performed by: INTERNAL MEDICINE

## 2019-08-17 PROCEDURE — 82962 GLUCOSE BLOOD TEST: CPT

## 2019-08-17 PROCEDURE — 93005 ELECTROCARDIOGRAM TRACING: CPT | Performed by: INTERNAL MEDICINE

## 2019-08-17 RX ADMIN — IPRATROPIUM BROMIDE 0.5 MG: 0.5 SOLUTION RESPIRATORY (INHALATION) at 06:32

## 2019-08-17 RX ADMIN — MONTELUKAST SODIUM 10 MG: 10 TABLET, COATED ORAL at 20:28

## 2019-08-17 RX ADMIN — APIXABAN 5 MG: 5 TABLET, FILM COATED ORAL at 20:28

## 2019-08-17 RX ADMIN — SODIUM CHLORIDE 30 ML/HR: 9 INJECTION, SOLUTION INTRAVENOUS at 16:26

## 2019-08-17 RX ADMIN — AMOXICILLIN AND CLAVULANATE POTASSIUM 500 MG: 500; 125 TABLET, FILM COATED ORAL at 08:05

## 2019-08-17 RX ADMIN — FLECAINIDE ACETATE 50 MG: 50 TABLET ORAL at 20:28

## 2019-08-17 RX ADMIN — CHOLECALCIFEROL TAB 10 MCG (400 UNIT) 1000 UNITS: 10 TAB at 08:05

## 2019-08-17 RX ADMIN — CETIRIZINE HYDROCHLORIDE 5 MG: 10 TABLET, FILM COATED ORAL at 08:05

## 2019-08-17 RX ADMIN — ALLOPURINOL 100 MG: 100 TABLET ORAL at 20:28

## 2019-08-17 RX ADMIN — SODIUM CHLORIDE, PRESERVATIVE FREE 3 ML: 5 INJECTION INTRAVENOUS at 08:05

## 2019-08-17 RX ADMIN — ONDANSETRON 8 MG: 4 TABLET, FILM COATED ORAL at 16:54

## 2019-08-17 RX ADMIN — OXYCODONE HYDROCHLORIDE 20 MG: 20 TABLET, FILM COATED, EXTENDED RELEASE ORAL at 08:04

## 2019-08-17 RX ADMIN — OXYCODONE HYDROCHLORIDE 20 MG: 20 TABLET, FILM COATED, EXTENDED RELEASE ORAL at 20:27

## 2019-08-17 RX ADMIN — BUDESONIDE 0.5 MG: 0.5 INHALANT RESPIRATORY (INHALATION) at 06:33

## 2019-08-17 RX ADMIN — FLECAINIDE ACETATE 50 MG: 50 TABLET ORAL at 08:06

## 2019-08-17 RX ADMIN — INSULIN ASPART 2 UNITS: 100 INJECTION, SOLUTION INTRAVENOUS; SUBCUTANEOUS at 20:26

## 2019-08-17 RX ADMIN — ONDANSETRON 8 MG: 4 TABLET, FILM COATED ORAL at 12:18

## 2019-08-17 RX ADMIN — SODIUM CHLORIDE, PRESERVATIVE FREE 3 ML: 5 INJECTION INTRAVENOUS at 20:28

## 2019-08-17 RX ADMIN — BUDESONIDE 0.5 MG: 0.5 INHALANT RESPIRATORY (INHALATION) at 18:36

## 2019-08-17 RX ADMIN — DRONABINOL 2.5 MG: 2.5 CAPSULE ORAL at 08:04

## 2019-08-17 RX ADMIN — PANTOPRAZOLE SODIUM 40 MG: 40 TABLET, DELAYED RELEASE ORAL at 05:20

## 2019-08-17 RX ADMIN — ONDANSETRON 8 MG: 4 TABLET, FILM COATED ORAL at 08:05

## 2019-08-17 RX ADMIN — IPRATROPIUM BROMIDE 0.5 MG: 0.5 SOLUTION RESPIRATORY (INHALATION) at 18:36

## 2019-08-17 RX ADMIN — DRONABINOL 2.5 MG: 2.5 CAPSULE ORAL at 16:54

## 2019-08-17 RX ADMIN — FLUOXETINE 40 MG: 20 CAPSULE ORAL at 05:20

## 2019-08-17 RX ADMIN — AMOXICILLIN AND CLAVULANATE POTASSIUM 500 MG: 500; 125 TABLET, FILM COATED ORAL at 20:28

## 2019-08-17 RX ADMIN — ALLOPURINOL 100 MG: 100 TABLET ORAL at 08:05

## 2019-08-17 RX ADMIN — APIXABAN 5 MG: 5 TABLET, FILM COATED ORAL at 08:04

## 2019-08-18 LAB
ALBUMIN SERPL-MCNC: 2.24 G/DL (ref 3.5–5.2)
ALBUMIN/GLOB SERPL: 0.6 G/DL
ALP SERPL-CCNC: 122 U/L (ref 39–117)
ALT SERPL W P-5'-P-CCNC: 6 U/L (ref 1–41)
ANION GAP SERPL CALCULATED.3IONS-SCNC: 11 MMOL/L (ref 5–15)
ANISOCYTOSIS BLD QL: ABNORMAL
AST SERPL-CCNC: 20 U/L (ref 1–40)
BASOPHILS # BLD MANUAL: 0.11 10*3/MM3 (ref 0–0.2)
BASOPHILS NFR BLD AUTO: 1 % (ref 0–1.5)
BILIRUB SERPL-MCNC: 0.2 MG/DL (ref 0.2–1.2)
BUN BLD-MCNC: 18 MG/DL (ref 8–23)
BUN/CREAT SERPL: 13 (ref 7–25)
CALCIUM SPEC-SCNC: 9.6 MG/DL (ref 8.6–10.5)
CHLORIDE SERPL-SCNC: 101 MMOL/L (ref 98–107)
CO2 SERPL-SCNC: 27 MMOL/L (ref 22–29)
CREAT BLD-MCNC: 1.38 MG/DL (ref 0.76–1.27)
CRP SERPL-MCNC: 2.22 MG/DL (ref 0–0.5)
DACRYOCYTES BLD QL SMEAR: ABNORMAL
DEPRECATED RDW RBC AUTO: 56.3 FL (ref 37–54)
EOSINOPHIL # BLD MANUAL: 0.22 10*3/MM3 (ref 0–0.4)
EOSINOPHIL NFR BLD MANUAL: 2 % (ref 0.3–6.2)
ERYTHROCYTE [DISTWIDTH] IN BLOOD BY AUTOMATED COUNT: 17.9 % (ref 12.3–15.4)
GFR SERPL CREATININE-BSD FRML MDRD: 51 ML/MIN/1.73
GLOBULIN UR ELPH-MCNC: 3.5 GM/DL
GLUCOSE BLD-MCNC: 109 MG/DL (ref 65–99)
GLUCOSE BLDC GLUCOMTR-MCNC: 106 MG/DL (ref 70–130)
GLUCOSE BLDC GLUCOMTR-MCNC: 115 MG/DL (ref 70–130)
GLUCOSE BLDC GLUCOMTR-MCNC: 150 MG/DL (ref 70–130)
GLUCOSE BLDC GLUCOMTR-MCNC: 152 MG/DL (ref 70–130)
HCT VFR BLD AUTO: 26.1 % (ref 37.5–51)
HGB BLD-MCNC: 7.9 G/DL (ref 13–17.7)
HYPOCHROMIA BLD QL: ABNORMAL
LYMPHOCYTES # BLD MANUAL: 1.65 10*3/MM3 (ref 0.7–3.1)
LYMPHOCYTES NFR BLD MANUAL: 15 % (ref 19.6–45.3)
LYMPHOCYTES NFR BLD MANUAL: 2 % (ref 5–12)
MCH RBC QN AUTO: 27.2 PG (ref 26.6–33)
MCHC RBC AUTO-ENTMCNC: 30.3 G/DL (ref 31.5–35.7)
MCV RBC AUTO: 90 FL (ref 79–97)
METAMYELOCYTES NFR BLD MANUAL: 5 % (ref 0–0)
MONOCYTES # BLD AUTO: 0.22 10*3/MM3 (ref 0.1–0.9)
NEUTROPHILS # BLD AUTO: 8.24 10*3/MM3 (ref 1.7–7)
NEUTROPHILS NFR BLD MANUAL: 71 % (ref 42.7–76)
NEUTS BAND NFR BLD MANUAL: 4 % (ref 0–5)
OVALOCYTES BLD QL SMEAR: ABNORMAL
PLATELET # BLD AUTO: 568 10*3/MM3 (ref 140–450)
PMV BLD AUTO: 9.5 FL (ref 6–12)
POTASSIUM BLD-SCNC: 4.4 MMOL/L (ref 3.5–5.2)
PROT SERPL-MCNC: 5.7 G/DL (ref 6–8.5)
RBC # BLD AUTO: 2.9 10*6/MM3 (ref 4.14–5.8)
SCAN SLIDE: NORMAL
SMALL PLATELETS BLD QL SMEAR: ABNORMAL
SODIUM BLD-SCNC: 139 MMOL/L (ref 136–145)
WBC NRBC COR # BLD: 10.99 10*3/MM3 (ref 3.4–10.8)

## 2019-08-18 PROCEDURE — 63710000001 DRONABINOL PER 2.5 MG: Performed by: INTERNAL MEDICINE

## 2019-08-18 PROCEDURE — 86140 C-REACTIVE PROTEIN: CPT | Performed by: NURSE PRACTITIONER

## 2019-08-18 PROCEDURE — 82962 GLUCOSE BLOOD TEST: CPT

## 2019-08-18 PROCEDURE — 85007 BL SMEAR W/DIFF WBC COUNT: CPT | Performed by: INTERNAL MEDICINE

## 2019-08-18 PROCEDURE — 94799 UNLISTED PULMONARY SVC/PX: CPT

## 2019-08-18 PROCEDURE — 85025 COMPLETE CBC W/AUTO DIFF WBC: CPT | Performed by: INTERNAL MEDICINE

## 2019-08-18 PROCEDURE — 80053 COMPREHEN METABOLIC PANEL: CPT | Performed by: INTERNAL MEDICINE

## 2019-08-18 PROCEDURE — 63710000001 INSULIN ASPART PER 5 UNITS: Performed by: NURSE PRACTITIONER

## 2019-08-18 PROCEDURE — 99233 SBSQ HOSP IP/OBS HIGH 50: CPT | Performed by: INTERNAL MEDICINE

## 2019-08-18 RX ORDER — AMOXICILLIN 250 MG
2 CAPSULE ORAL NIGHTLY
Status: DISCONTINUED | OUTPATIENT
Start: 2019-08-18 | End: 2019-09-05 | Stop reason: HOSPADM

## 2019-08-18 RX ADMIN — IPRATROPIUM BROMIDE 0.5 MG: 0.5 SOLUTION RESPIRATORY (INHALATION) at 13:04

## 2019-08-18 RX ADMIN — SODIUM CHLORIDE 30 ML/HR: 9 INJECTION, SOLUTION INTRAVENOUS at 16:46

## 2019-08-18 RX ADMIN — SODIUM CHLORIDE, PRESERVATIVE FREE 3 ML: 5 INJECTION INTRAVENOUS at 08:19

## 2019-08-18 RX ADMIN — ONDANSETRON 8 MG: 4 TABLET, FILM COATED ORAL at 08:18

## 2019-08-18 RX ADMIN — INSULIN ASPART 2 UNITS: 100 INJECTION, SOLUTION INTRAVENOUS; SUBCUTANEOUS at 22:04

## 2019-08-18 RX ADMIN — AMOXICILLIN AND CLAVULANATE POTASSIUM 500 MG: 500; 125 TABLET, FILM COATED ORAL at 21:56

## 2019-08-18 RX ADMIN — IPRATROPIUM BROMIDE 0.5 MG: 0.5 SOLUTION RESPIRATORY (INHALATION) at 18:58

## 2019-08-18 RX ADMIN — SODIUM CHLORIDE 30 ML/HR: 9 INJECTION, SOLUTION INTRAVENOUS at 16:48

## 2019-08-18 RX ADMIN — PANTOPRAZOLE SODIUM 40 MG: 40 TABLET, DELAYED RELEASE ORAL at 05:32

## 2019-08-18 RX ADMIN — IPRATROPIUM BROMIDE 0.5 MG: 0.5 SOLUTION RESPIRATORY (INHALATION) at 07:07

## 2019-08-18 RX ADMIN — SODIUM CHLORIDE, PRESERVATIVE FREE 3 ML: 5 INJECTION INTRAVENOUS at 22:06

## 2019-08-18 RX ADMIN — MONTELUKAST SODIUM 10 MG: 10 TABLET, COATED ORAL at 21:57

## 2019-08-18 RX ADMIN — FLECAINIDE ACETATE 50 MG: 50 TABLET ORAL at 21:57

## 2019-08-18 RX ADMIN — CETIRIZINE HYDROCHLORIDE 5 MG: 10 TABLET, FILM COATED ORAL at 08:18

## 2019-08-18 RX ADMIN — AMOXICILLIN AND CLAVULANATE POTASSIUM 500 MG: 500; 125 TABLET, FILM COATED ORAL at 08:18

## 2019-08-18 RX ADMIN — ONDANSETRON 8 MG: 4 TABLET, FILM COATED ORAL at 11:53

## 2019-08-18 RX ADMIN — FLUOXETINE 40 MG: 20 CAPSULE ORAL at 05:32

## 2019-08-18 RX ADMIN — FLECAINIDE ACETATE 50 MG: 50 TABLET ORAL at 08:19

## 2019-08-18 RX ADMIN — APIXABAN 5 MG: 5 TABLET, FILM COATED ORAL at 21:57

## 2019-08-18 RX ADMIN — ALLOPURINOL 100 MG: 100 TABLET ORAL at 21:57

## 2019-08-18 RX ADMIN — CHOLECALCIFEROL TAB 10 MCG (400 UNIT) 1000 UNITS: 10 TAB at 08:16

## 2019-08-18 RX ADMIN — OXYCODONE HYDROCHLORIDE 20 MG: 20 TABLET, FILM COATED, EXTENDED RELEASE ORAL at 21:56

## 2019-08-18 RX ADMIN — ONDANSETRON 8 MG: 4 TABLET, FILM COATED ORAL at 16:45

## 2019-08-18 RX ADMIN — APIXABAN 5 MG: 5 TABLET, FILM COATED ORAL at 08:16

## 2019-08-18 RX ADMIN — DRONABINOL 2.5 MG: 2.5 CAPSULE ORAL at 08:18

## 2019-08-18 RX ADMIN — SENNOSIDES, DOCUSATE SODIUM 2 TABLET: 50; 8.6 TABLET, FILM COATED ORAL at 21:57

## 2019-08-18 RX ADMIN — DRONABINOL 2.5 MG: 2.5 CAPSULE ORAL at 16:45

## 2019-08-18 RX ADMIN — OXYCODONE HYDROCHLORIDE 20 MG: 20 TABLET, FILM COATED, EXTENDED RELEASE ORAL at 08:19

## 2019-08-18 RX ADMIN — ALLOPURINOL 100 MG: 100 TABLET ORAL at 08:18

## 2019-08-18 RX ADMIN — BUDESONIDE 0.5 MG: 0.5 INHALANT RESPIRATORY (INHALATION) at 18:58

## 2019-08-18 RX ADMIN — BUDESONIDE 0.5 MG: 0.5 INHALANT RESPIRATORY (INHALATION) at 07:17

## 2019-08-19 ENCOUNTER — APPOINTMENT (OUTPATIENT)
Dept: GENERAL RADIOLOGY | Facility: HOSPITAL | Age: 69
End: 2019-08-19

## 2019-08-19 LAB
A-A DO2: 132.7 MMHG (ref 0–300)
ALBUMIN SERPL-MCNC: 2.68 G/DL (ref 3.5–5.2)
ALBUMIN/GLOB SERPL: 0.7 G/DL
ALP SERPL-CCNC: 145 U/L (ref 39–117)
ALT SERPL W P-5'-P-CCNC: 9 U/L (ref 1–41)
ANION GAP SERPL CALCULATED.3IONS-SCNC: 12.2 MMOL/L (ref 5–15)
ANISOCYTOSIS BLD QL: ABNORMAL
ARTERIAL PATENCY WRIST A: POSITIVE
AST SERPL-CCNC: 27 U/L (ref 1–40)
ATMOSPHERIC PRESS: 727 MMHG
BASE EXCESS BLDA CALC-SCNC: 4.3 MMOL/L
BASOPHILS # BLD MANUAL: 0.14 10*3/MM3 (ref 0–0.2)
BASOPHILS NFR BLD AUTO: 1 % (ref 0–1.5)
BDY SITE: ABNORMAL
BILIRUB SERPL-MCNC: 0.3 MG/DL (ref 0.2–1.2)
BODY TEMPERATURE: 98.6 C
BUN BLD-MCNC: 17 MG/DL (ref 8–23)
BUN/CREAT SERPL: 14 (ref 7–25)
CALCIUM SPEC-SCNC: 9.8 MG/DL (ref 8.6–10.5)
CHLORIDE SERPL-SCNC: 101 MMOL/L (ref 98–107)
CO2 SERPL-SCNC: 27.8 MMOL/L (ref 22–29)
COHGB MFR BLD: 1 % (ref 0–5)
CREAT BLD-MCNC: 1.21 MG/DL (ref 0.76–1.27)
CRP SERPL-MCNC: 1.79 MG/DL (ref 0–0.5)
DACRYOCYTES BLD QL SMEAR: ABNORMAL
DEPRECATED RDW RBC AUTO: 56.6 FL (ref 37–54)
EOSINOPHIL # BLD MANUAL: 0.27 10*3/MM3 (ref 0–0.4)
EOSINOPHIL NFR BLD MANUAL: 2 % (ref 0.3–6.2)
ERYTHROCYTE [DISTWIDTH] IN BLOOD BY AUTOMATED COUNT: 18 % (ref 12.3–15.4)
GFR SERPL CREATININE-BSD FRML MDRD: 59 ML/MIN/1.73
GLOBULIN UR ELPH-MCNC: 3.6 GM/DL
GLUCOSE BLD-MCNC: 115 MG/DL (ref 65–99)
GLUCOSE BLDC GLUCOMTR-MCNC: 109 MG/DL (ref 70–130)
GLUCOSE BLDC GLUCOMTR-MCNC: 121 MG/DL (ref 70–130)
GLUCOSE BLDC GLUCOMTR-MCNC: 187 MG/DL (ref 70–130)
GLUCOSE BLDC GLUCOMTR-MCNC: 217 MG/DL (ref 70–130)
HCO3 BLDA-SCNC: 28.7 MMOL/L (ref 22–26)
HCT VFR BLD AUTO: 28.6 % (ref 37.5–51)
HCT VFR BLD CALC: 28 % (ref 42–52)
HGB BLD-MCNC: 8.5 G/DL (ref 13–17.7)
HGB BLDA-MCNC: 9.5 G/DL (ref 12–16)
HOROWITZ INDEX BLD+IHG-RTO: 36 %
HYPOCHROMIA BLD QL: ABNORMAL
LYMPHOCYTES # BLD MANUAL: 1.62 10*3/MM3 (ref 0.7–3.1)
LYMPHOCYTES NFR BLD MANUAL: 12 % (ref 19.6–45.3)
LYMPHOCYTES NFR BLD MANUAL: 3 % (ref 5–12)
MCH RBC QN AUTO: 26.6 PG (ref 26.6–33)
MCHC RBC AUTO-ENTMCNC: 29.7 G/DL (ref 31.5–35.7)
MCV RBC AUTO: 89.7 FL (ref 79–97)
METHGB BLD QL: 0.1 % (ref 0–3)
MODALITY: ABNORMAL
MONOCYTES # BLD AUTO: 0.41 10*3/MM3 (ref 0.1–0.9)
MYELOCYTES NFR BLD MANUAL: 4 % (ref 0–0)
NEUTROPHILS # BLD AUTO: 10.55 10*3/MM3 (ref 1.7–7)
NEUTROPHILS NFR BLD MANUAL: 76 % (ref 42.7–76)
NEUTS BAND NFR BLD MANUAL: 2 % (ref 0–5)
OVALOCYTES BLD QL SMEAR: ABNORMAL
OXYHGB MFR BLDV: 89.6 % (ref 85–100)
PCO2 BLDA: 42.5 MM HG (ref 35–45)
PH BLDA: 7.45 PH UNITS (ref 7.35–7.45)
PLATELET # BLD AUTO: 581 10*3/MM3 (ref 140–450)
PMV BLD AUTO: 9 FL (ref 6–12)
PO2 BLDA: 62.8 MM HG (ref 80–100)
POTASSIUM BLD-SCNC: 4 MMOL/L (ref 3.5–5.2)
PROT SERPL-MCNC: 6.3 G/DL (ref 6–8.5)
RBC # BLD AUTO: 3.19 10*6/MM3 (ref 4.14–5.8)
SAO2 % BLDCOA: 90.6 % (ref 90–100)
SCAN SLIDE: NORMAL
SCHISTOCYTES BLD QL SMEAR: ABNORMAL
SMALL PLATELETS BLD QL SMEAR: ABNORMAL
SODIUM BLD-SCNC: 141 MMOL/L (ref 136–145)
WBC NRBC COR # BLD: 13.53 10*3/MM3 (ref 3.4–10.8)

## 2019-08-19 PROCEDURE — 80053 COMPREHEN METABOLIC PANEL: CPT | Performed by: INTERNAL MEDICINE

## 2019-08-19 PROCEDURE — 94799 UNLISTED PULMONARY SVC/PX: CPT

## 2019-08-19 PROCEDURE — 93010 ELECTROCARDIOGRAM REPORT: CPT | Performed by: INTERNAL MEDICINE

## 2019-08-19 PROCEDURE — 82805 BLOOD GASES W/O2 SATURATION: CPT | Performed by: INTERNAL MEDICINE

## 2019-08-19 PROCEDURE — 71045 X-RAY EXAM CHEST 1 VIEW: CPT

## 2019-08-19 PROCEDURE — 82962 GLUCOSE BLOOD TEST: CPT

## 2019-08-19 PROCEDURE — 99223 1ST HOSP IP/OBS HIGH 75: CPT | Performed by: INTERNAL MEDICINE

## 2019-08-19 PROCEDURE — 82375 ASSAY CARBOXYHB QUANT: CPT | Performed by: INTERNAL MEDICINE

## 2019-08-19 PROCEDURE — 71045 X-RAY EXAM CHEST 1 VIEW: CPT | Performed by: RADIOLOGY

## 2019-08-19 PROCEDURE — 99232 SBSQ HOSP IP/OBS MODERATE 35: CPT | Performed by: INTERNAL MEDICINE

## 2019-08-19 PROCEDURE — 63710000001 INSULIN ASPART PER 5 UNITS: Performed by: NURSE PRACTITIONER

## 2019-08-19 PROCEDURE — 83050 HGB METHEMOGLOBIN QUAN: CPT | Performed by: INTERNAL MEDICINE

## 2019-08-19 PROCEDURE — 85007 BL SMEAR W/DIFF WBC COUNT: CPT | Performed by: INTERNAL MEDICINE

## 2019-08-19 PROCEDURE — 85025 COMPLETE CBC W/AUTO DIFF WBC: CPT | Performed by: INTERNAL MEDICINE

## 2019-08-19 PROCEDURE — 36600 WITHDRAWAL OF ARTERIAL BLOOD: CPT | Performed by: INTERNAL MEDICINE

## 2019-08-19 PROCEDURE — 93005 ELECTROCARDIOGRAM TRACING: CPT | Performed by: INTERNAL MEDICINE

## 2019-08-19 PROCEDURE — 63710000001 DRONABINOL PER 2.5 MG: Performed by: INTERNAL MEDICINE

## 2019-08-19 PROCEDURE — 86140 C-REACTIVE PROTEIN: CPT | Performed by: NURSE PRACTITIONER

## 2019-08-19 RX ORDER — IPRATROPIUM BROMIDE AND ALBUTEROL SULFATE 2.5; .5 MG/3ML; MG/3ML
3 SOLUTION RESPIRATORY (INHALATION) EVERY 6 HOURS PRN
Status: DISCONTINUED | OUTPATIENT
Start: 2019-08-19 | End: 2019-08-29

## 2019-08-19 RX ORDER — SODIUM CHLORIDE 0.9 % (FLUSH) 0.9 %
10 SYRINGE (ML) INJECTION EVERY 12 HOURS SCHEDULED
Status: DISCONTINUED | OUTPATIENT
Start: 2019-08-19 | End: 2019-09-05 | Stop reason: HOSPADM

## 2019-08-19 RX ORDER — SODIUM CHLORIDE 0.9 % (FLUSH) 0.9 %
10 SYRINGE (ML) INJECTION AS NEEDED
Status: DISCONTINUED | OUTPATIENT
Start: 2019-08-19 | End: 2019-09-05 | Stop reason: HOSPADM

## 2019-08-19 RX ORDER — SODIUM CHLORIDE 0.9 % (FLUSH) 0.9 %
20 SYRINGE (ML) INJECTION AS NEEDED
Status: DISCONTINUED | OUTPATIENT
Start: 2019-08-19 | End: 2019-09-05 | Stop reason: HOSPADM

## 2019-08-19 RX ADMIN — SODIUM CHLORIDE, PRESERVATIVE FREE 3 ML: 5 INJECTION INTRAVENOUS at 08:09

## 2019-08-19 RX ADMIN — APIXABAN 5 MG: 5 TABLET, FILM COATED ORAL at 21:18

## 2019-08-19 RX ADMIN — ONDANSETRON 8 MG: 4 TABLET, FILM COATED ORAL at 08:09

## 2019-08-19 RX ADMIN — OXYCODONE HYDROCHLORIDE 20 MG: 20 TABLET, FILM COATED, EXTENDED RELEASE ORAL at 08:13

## 2019-08-19 RX ADMIN — IPRATROPIUM BROMIDE 0.5 MG: 0.5 SOLUTION RESPIRATORY (INHALATION) at 13:14

## 2019-08-19 RX ADMIN — MONTELUKAST SODIUM 10 MG: 10 TABLET, COATED ORAL at 21:18

## 2019-08-19 RX ADMIN — IPRATROPIUM BROMIDE 0.5 MG: 0.5 SOLUTION RESPIRATORY (INHALATION) at 00:12

## 2019-08-19 RX ADMIN — PANTOPRAZOLE SODIUM 40 MG: 40 TABLET, DELAYED RELEASE ORAL at 06:49

## 2019-08-19 RX ADMIN — IPRATROPIUM BROMIDE AND ALBUTEROL SULFATE 3 ML: .5; 3 SOLUTION RESPIRATORY (INHALATION) at 19:29

## 2019-08-19 RX ADMIN — BUDESONIDE 0.5 MG: 0.5 INHALANT RESPIRATORY (INHALATION) at 06:45

## 2019-08-19 RX ADMIN — BUDESONIDE 0.5 MG: 0.5 INHALANT RESPIRATORY (INHALATION) at 19:29

## 2019-08-19 RX ADMIN — AMOXICILLIN AND CLAVULANATE POTASSIUM 500 MG: 500; 125 TABLET, FILM COATED ORAL at 21:18

## 2019-08-19 RX ADMIN — DRONABINOL 2.5 MG: 2.5 CAPSULE ORAL at 08:08

## 2019-08-19 RX ADMIN — FLUOXETINE 40 MG: 20 CAPSULE ORAL at 06:49

## 2019-08-19 RX ADMIN — AMOXICILLIN AND CLAVULANATE POTASSIUM 500 MG: 500; 125 TABLET, FILM COATED ORAL at 08:08

## 2019-08-19 RX ADMIN — ONDANSETRON 8 MG: 4 TABLET, FILM COATED ORAL at 16:51

## 2019-08-19 RX ADMIN — DRONABINOL 2.5 MG: 2.5 CAPSULE ORAL at 16:51

## 2019-08-19 RX ADMIN — SODIUM CHLORIDE 30 ML/HR: 9 INJECTION, SOLUTION INTRAVENOUS at 01:01

## 2019-08-19 RX ADMIN — ALLOPURINOL 100 MG: 100 TABLET ORAL at 21:18

## 2019-08-19 RX ADMIN — APIXABAN 5 MG: 5 TABLET, FILM COATED ORAL at 08:09

## 2019-08-19 RX ADMIN — SODIUM CHLORIDE, PRESERVATIVE FREE 10 ML: 5 INJECTION INTRAVENOUS at 21:19

## 2019-08-19 RX ADMIN — FLECAINIDE ACETATE 50 MG: 50 TABLET ORAL at 21:18

## 2019-08-19 RX ADMIN — CHOLECALCIFEROL TAB 10 MCG (400 UNIT) 1000 UNITS: 10 TAB at 08:08

## 2019-08-19 RX ADMIN — INSULIN ASPART 3 UNITS: 100 INJECTION, SOLUTION INTRAVENOUS; SUBCUTANEOUS at 21:18

## 2019-08-19 RX ADMIN — ONDANSETRON 8 MG: 4 TABLET, FILM COATED ORAL at 11:10

## 2019-08-19 RX ADMIN — FLECAINIDE ACETATE 50 MG: 50 TABLET ORAL at 08:13

## 2019-08-19 RX ADMIN — SENNOSIDES, DOCUSATE SODIUM 2 TABLET: 50; 8.6 TABLET, FILM COATED ORAL at 21:18

## 2019-08-19 RX ADMIN — IPRATROPIUM BROMIDE 0.5 MG: 0.5 SOLUTION RESPIRATORY (INHALATION) at 06:45

## 2019-08-19 RX ADMIN — ALLOPURINOL 100 MG: 100 TABLET ORAL at 08:08

## 2019-08-19 RX ADMIN — OXYCODONE HYDROCHLORIDE 20 MG: 20 TABLET, FILM COATED, EXTENDED RELEASE ORAL at 21:24

## 2019-08-19 RX ADMIN — CETIRIZINE HYDROCHLORIDE 5 MG: 10 TABLET, FILM COATED ORAL at 08:09

## 2019-08-20 ENCOUNTER — APPOINTMENT (OUTPATIENT)
Dept: CT IMAGING | Facility: HOSPITAL | Age: 69
End: 2019-08-20

## 2019-08-20 ENCOUNTER — APPOINTMENT (OUTPATIENT)
Dept: ONCOLOGY | Facility: HOSPITAL | Age: 69
End: 2019-08-20
Attending: INTERNAL MEDICINE

## 2019-08-20 LAB
ALBUMIN SERPL-MCNC: 2.59 G/DL (ref 3.5–5.2)
ALBUMIN/GLOB SERPL: 0.8 G/DL
ALP SERPL-CCNC: 129 U/L (ref 39–117)
ALT SERPL W P-5'-P-CCNC: 8 U/L (ref 1–41)
ANION GAP SERPL CALCULATED.3IONS-SCNC: 7.1 MMOL/L (ref 5–15)
ANISOCYTOSIS BLD QL: ABNORMAL
AST SERPL-CCNC: 19 U/L (ref 1–40)
BILIRUB SERPL-MCNC: 0.3 MG/DL (ref 0.2–1.2)
BUN BLD-MCNC: 16 MG/DL (ref 8–23)
BUN/CREAT SERPL: 14.7 (ref 7–25)
CALCIUM SPEC-SCNC: 9.4 MG/DL (ref 8.6–10.5)
CANCER AG19-9 SERPL-ACNC: 119.6 U/ML
CHLORIDE SERPL-SCNC: 102 MMOL/L (ref 98–107)
CO2 SERPL-SCNC: 27.9 MMOL/L (ref 22–29)
CREAT BLD-MCNC: 1.09 MG/DL (ref 0.76–1.27)
CRP SERPL-MCNC: 1.73 MG/DL (ref 0–0.5)
DACRYOCYTES BLD QL SMEAR: ABNORMAL
DEPRECATED RDW RBC AUTO: 57.1 FL (ref 37–54)
EOSINOPHIL # BLD MANUAL: 0.21 10*3/MM3 (ref 0–0.4)
EOSINOPHIL NFR BLD MANUAL: 2 % (ref 0.3–6.2)
ERYTHROCYTE [DISTWIDTH] IN BLOOD BY AUTOMATED COUNT: 18 % (ref 12.3–15.4)
GFR SERPL CREATININE-BSD FRML MDRD: 67 ML/MIN/1.73
GLOBULIN UR ELPH-MCNC: 3.4 GM/DL
GLUCOSE BLD-MCNC: 128 MG/DL (ref 65–99)
GLUCOSE BLDC GLUCOMTR-MCNC: 123 MG/DL (ref 70–130)
GLUCOSE BLDC GLUCOMTR-MCNC: 129 MG/DL (ref 70–130)
GLUCOSE BLDC GLUCOMTR-MCNC: 159 MG/DL (ref 70–130)
GLUCOSE BLDC GLUCOMTR-MCNC: 190 MG/DL (ref 70–130)
HCT VFR BLD AUTO: 25.9 % (ref 37.5–51)
HGB BLD-MCNC: 7.7 G/DL (ref 13–17.7)
HYPOCHROMIA BLD QL: ABNORMAL
LYMPHOCYTES # BLD MANUAL: 1.92 10*3/MM3 (ref 0.7–3.1)
LYMPHOCYTES NFR BLD MANUAL: 18 % (ref 19.6–45.3)
LYMPHOCYTES NFR BLD MANUAL: 6 % (ref 5–12)
MCH RBC QN AUTO: 26.7 PG (ref 26.6–33)
MCHC RBC AUTO-ENTMCNC: 29.7 G/DL (ref 31.5–35.7)
MCV RBC AUTO: 89.9 FL (ref 79–97)
METAMYELOCYTES NFR BLD MANUAL: 3 % (ref 0–0)
MONOCYTES # BLD AUTO: 0.64 10*3/MM3 (ref 0.1–0.9)
MYELOCYTES NFR BLD MANUAL: 2 % (ref 0–0)
NEUTROPHILS # BLD AUTO: 7.35 10*3/MM3 (ref 1.7–7)
NEUTROPHILS NFR BLD MANUAL: 68 % (ref 42.7–76)
NEUTS BAND NFR BLD MANUAL: 1 % (ref 0–5)
NT-PROBNP SERPL-MCNC: 201.8 PG/ML (ref 5–900)
OVALOCYTES BLD QL SMEAR: ABNORMAL
PLATELET # BLD AUTO: 485 10*3/MM3 (ref 140–450)
PMV BLD AUTO: 9.2 FL (ref 6–12)
POTASSIUM BLD-SCNC: 3.7 MMOL/L (ref 3.5–5.2)
PROT SERPL-MCNC: 6 G/DL (ref 6–8.5)
RBC # BLD AUTO: 2.88 10*6/MM3 (ref 4.14–5.8)
SCAN SLIDE: NORMAL
SMALL PLATELETS BLD QL SMEAR: ABNORMAL
SODIUM BLD-SCNC: 137 MMOL/L (ref 136–145)
WBC NRBC COR # BLD: 10.65 10*3/MM3 (ref 3.4–10.8)

## 2019-08-20 PROCEDURE — 71250 CT THORAX DX C-: CPT

## 2019-08-20 PROCEDURE — 83880 ASSAY OF NATRIURETIC PEPTIDE: CPT | Performed by: INTERNAL MEDICINE

## 2019-08-20 PROCEDURE — 85025 COMPLETE CBC W/AUTO DIFF WBC: CPT | Performed by: INTERNAL MEDICINE

## 2019-08-20 PROCEDURE — 86301 IMMUNOASSAY TUMOR CA 19-9: CPT | Performed by: INTERNAL MEDICINE

## 2019-08-20 PROCEDURE — 99232 SBSQ HOSP IP/OBS MODERATE 35: CPT | Performed by: INTERNAL MEDICINE

## 2019-08-20 PROCEDURE — 80053 COMPREHEN METABOLIC PANEL: CPT | Performed by: INTERNAL MEDICINE

## 2019-08-20 PROCEDURE — 85007 BL SMEAR W/DIFF WBC COUNT: CPT | Performed by: INTERNAL MEDICINE

## 2019-08-20 PROCEDURE — 86140 C-REACTIVE PROTEIN: CPT | Performed by: NURSE PRACTITIONER

## 2019-08-20 PROCEDURE — 71250 CT THORAX DX C-: CPT | Performed by: RADIOLOGY

## 2019-08-20 PROCEDURE — 63710000001 INSULIN ASPART PER 5 UNITS: Performed by: NURSE PRACTITIONER

## 2019-08-20 PROCEDURE — 99233 SBSQ HOSP IP/OBS HIGH 50: CPT | Performed by: NURSE PRACTITIONER

## 2019-08-20 PROCEDURE — 63710000001 DRONABINOL PER 2.5 MG: Performed by: INTERNAL MEDICINE

## 2019-08-20 PROCEDURE — 94799 UNLISTED PULMONARY SVC/PX: CPT

## 2019-08-20 PROCEDURE — 82962 GLUCOSE BLOOD TEST: CPT

## 2019-08-20 RX ADMIN — SENNOSIDES, DOCUSATE SODIUM 2 TABLET: 50; 8.6 TABLET, FILM COATED ORAL at 21:09

## 2019-08-20 RX ADMIN — POLYETHYLENE GLYCOL (3350) 17 G: 17 POWDER, FOR SOLUTION ORAL at 16:33

## 2019-08-20 RX ADMIN — BUDESONIDE 0.5 MG: 0.5 INHALANT RESPIRATORY (INHALATION) at 06:58

## 2019-08-20 RX ADMIN — ONDANSETRON 8 MG: 4 TABLET, FILM COATED ORAL at 16:33

## 2019-08-20 RX ADMIN — AMOXICILLIN AND CLAVULANATE POTASSIUM 500 MG: 500; 125 TABLET, FILM COATED ORAL at 08:36

## 2019-08-20 RX ADMIN — IPRATROPIUM BROMIDE AND ALBUTEROL SULFATE 3 ML: .5; 3 SOLUTION RESPIRATORY (INHALATION) at 13:40

## 2019-08-20 RX ADMIN — IPRATROPIUM BROMIDE AND ALBUTEROL SULFATE 3 ML: .5; 3 SOLUTION RESPIRATORY (INHALATION) at 01:49

## 2019-08-20 RX ADMIN — ALLOPURINOL 100 MG: 100 TABLET ORAL at 08:36

## 2019-08-20 RX ADMIN — DRONABINOL 2.5 MG: 2.5 CAPSULE ORAL at 16:33

## 2019-08-20 RX ADMIN — IPRATROPIUM BROMIDE AND ALBUTEROL SULFATE 3 ML: .5; 3 SOLUTION RESPIRATORY (INHALATION) at 20:30

## 2019-08-20 RX ADMIN — INSULIN ASPART 2 UNITS: 100 INJECTION, SOLUTION INTRAVENOUS; SUBCUTANEOUS at 17:02

## 2019-08-20 RX ADMIN — APIXABAN 5 MG: 5 TABLET, FILM COATED ORAL at 08:35

## 2019-08-20 RX ADMIN — FLECAINIDE ACETATE 50 MG: 50 TABLET ORAL at 08:35

## 2019-08-20 RX ADMIN — IPRATROPIUM BROMIDE AND ALBUTEROL SULFATE 3 ML: .5; 3 SOLUTION RESPIRATORY (INHALATION) at 06:57

## 2019-08-20 RX ADMIN — PANTOPRAZOLE SODIUM 40 MG: 40 TABLET, DELAYED RELEASE ORAL at 06:36

## 2019-08-20 RX ADMIN — SODIUM CHLORIDE, PRESERVATIVE FREE 10 ML: 5 INJECTION INTRAVENOUS at 08:36

## 2019-08-20 RX ADMIN — ONDANSETRON 8 MG: 4 TABLET, FILM COATED ORAL at 06:36

## 2019-08-20 RX ADMIN — SODIUM CHLORIDE, PRESERVATIVE FREE 10 ML: 5 INJECTION INTRAVENOUS at 21:10

## 2019-08-20 RX ADMIN — OXYCODONE HYDROCHLORIDE 20 MG: 20 TABLET, FILM COATED, EXTENDED RELEASE ORAL at 08:39

## 2019-08-20 RX ADMIN — DRONABINOL 2.5 MG: 2.5 CAPSULE ORAL at 08:35

## 2019-08-20 RX ADMIN — APIXABAN 5 MG: 5 TABLET, FILM COATED ORAL at 21:09

## 2019-08-20 RX ADMIN — FLECAINIDE ACETATE 50 MG: 50 TABLET ORAL at 21:09

## 2019-08-20 RX ADMIN — BUDESONIDE 0.5 MG: 0.5 INHALANT RESPIRATORY (INHALATION) at 20:30

## 2019-08-20 RX ADMIN — ALLOPURINOL 100 MG: 100 TABLET ORAL at 21:09

## 2019-08-20 RX ADMIN — CETIRIZINE HYDROCHLORIDE 5 MG: 10 TABLET, FILM COATED ORAL at 08:36

## 2019-08-20 RX ADMIN — AMOXICILLIN AND CLAVULANATE POTASSIUM 500 MG: 500; 125 TABLET, FILM COATED ORAL at 21:09

## 2019-08-20 RX ADMIN — CHOLECALCIFEROL TAB 10 MCG (400 UNIT) 1000 UNITS: 10 TAB at 08:35

## 2019-08-20 RX ADMIN — MONTELUKAST SODIUM 10 MG: 10 TABLET, COATED ORAL at 21:09

## 2019-08-20 RX ADMIN — OXYCODONE HYDROCHLORIDE 20 MG: 20 TABLET, FILM COATED, EXTENDED RELEASE ORAL at 21:10

## 2019-08-20 RX ADMIN — ONDANSETRON 8 MG: 4 TABLET, FILM COATED ORAL at 10:50

## 2019-08-20 RX ADMIN — FLUOXETINE 40 MG: 20 CAPSULE ORAL at 06:36

## 2019-08-21 LAB
ANION GAP SERPL CALCULATED.3IONS-SCNC: 8.9 MMOL/L (ref 5–15)
ANISOCYTOSIS BLD QL: ABNORMAL
BUN BLD-MCNC: 13 MG/DL (ref 8–23)
BUN/CREAT SERPL: 12.5 (ref 7–25)
CALCIUM SPEC-SCNC: 9.4 MG/DL (ref 8.6–10.5)
CHLORIDE SERPL-SCNC: 102 MMOL/L (ref 98–107)
CO2 SERPL-SCNC: 25.1 MMOL/L (ref 22–29)
CREAT BLD-MCNC: 1.04 MG/DL (ref 0.76–1.27)
CRP SERPL-MCNC: 1.68 MG/DL (ref 0–0.5)
DACRYOCYTES BLD QL SMEAR: ABNORMAL
DEPRECATED RDW RBC AUTO: 56.2 FL (ref 37–54)
EOSINOPHIL # BLD MANUAL: 0.28 10*3/MM3 (ref 0–0.4)
EOSINOPHIL NFR BLD MANUAL: 3 % (ref 0.3–6.2)
ERYTHROCYTE [DISTWIDTH] IN BLOOD BY AUTOMATED COUNT: 18 % (ref 12.3–15.4)
GFR SERPL CREATININE-BSD FRML MDRD: 70 ML/MIN/1.73
GLUCOSE BLD-MCNC: 138 MG/DL (ref 65–99)
GLUCOSE BLDC GLUCOMTR-MCNC: 121 MG/DL (ref 70–130)
GLUCOSE BLDC GLUCOMTR-MCNC: 136 MG/DL (ref 70–130)
GLUCOSE BLDC GLUCOMTR-MCNC: 160 MG/DL (ref 70–130)
GLUCOSE BLDC GLUCOMTR-MCNC: 165 MG/DL (ref 70–130)
HCT VFR BLD AUTO: 25.8 % (ref 37.5–51)
HGB BLD-MCNC: 7.6 G/DL (ref 13–17.7)
HYPOCHROMIA BLD QL: ABNORMAL
LYMPHOCYTES # BLD MANUAL: 0.76 10*3/MM3 (ref 0.7–3.1)
LYMPHOCYTES NFR BLD MANUAL: 8 % (ref 19.6–45.3)
LYMPHOCYTES NFR BLD MANUAL: 9 % (ref 5–12)
MCH RBC QN AUTO: 26.4 PG (ref 26.6–33)
MCHC RBC AUTO-ENTMCNC: 29.5 G/DL (ref 31.5–35.7)
MCV RBC AUTO: 89.6 FL (ref 79–97)
METAMYELOCYTES NFR BLD MANUAL: 3 % (ref 0–0)
MONOCYTES # BLD AUTO: 0.85 10*3/MM3 (ref 0.1–0.9)
MYELOCYTES NFR BLD MANUAL: 1 % (ref 0–0)
NEUTROPHILS # BLD AUTO: 7.19 10*3/MM3 (ref 1.7–7)
NEUTROPHILS NFR BLD MANUAL: 76 % (ref 42.7–76)
OVALOCYTES BLD QL SMEAR: ABNORMAL
PHOSPHATE SERPL-MCNC: 3.8 MG/DL (ref 2.5–4.5)
PLAT MORPH BLD: NORMAL
PLATELET # BLD AUTO: 416 10*3/MM3 (ref 140–450)
PMV BLD AUTO: 9.4 FL (ref 6–12)
POTASSIUM BLD-SCNC: 4.1 MMOL/L (ref 3.5–5.2)
RBC # BLD AUTO: 2.88 10*6/MM3 (ref 4.14–5.8)
SCAN SLIDE: NORMAL
SODIUM BLD-SCNC: 136 MMOL/L (ref 136–145)
WBC NRBC COR # BLD: 9.46 10*3/MM3 (ref 3.4–10.8)

## 2019-08-21 PROCEDURE — 94799 UNLISTED PULMONARY SVC/PX: CPT

## 2019-08-21 PROCEDURE — 63710000001 DIPHENHYDRAMINE PER 50 MG: Performed by: INTERNAL MEDICINE

## 2019-08-21 PROCEDURE — 80048 BASIC METABOLIC PNL TOTAL CA: CPT | Performed by: INTERNAL MEDICINE

## 2019-08-21 PROCEDURE — 84100 ASSAY OF PHOSPHORUS: CPT | Performed by: INTERNAL MEDICINE

## 2019-08-21 PROCEDURE — 86140 C-REACTIVE PROTEIN: CPT | Performed by: NURSE PRACTITIONER

## 2019-08-21 PROCEDURE — 82962 GLUCOSE BLOOD TEST: CPT

## 2019-08-21 PROCEDURE — 63710000001 PROCHLORPERAZINE MALEATE PER 10 MG: Performed by: HOSPITALIST

## 2019-08-21 PROCEDURE — 99232 SBSQ HOSP IP/OBS MODERATE 35: CPT | Performed by: INTERNAL MEDICINE

## 2019-08-21 PROCEDURE — 85025 COMPLETE CBC W/AUTO DIFF WBC: CPT | Performed by: INTERNAL MEDICINE

## 2019-08-21 PROCEDURE — 63710000001 DRONABINOL PER 2.5 MG: Performed by: INTERNAL MEDICINE

## 2019-08-21 PROCEDURE — 63710000001 INSULIN ASPART PER 5 UNITS: Performed by: NURSE PRACTITIONER

## 2019-08-21 PROCEDURE — 85007 BL SMEAR W/DIFF WBC COUNT: CPT | Performed by: INTERNAL MEDICINE

## 2019-08-21 RX ORDER — LACTULOSE 10 G/15ML
20 SOLUTION ORAL ONCE
Status: COMPLETED | OUTPATIENT
Start: 2019-08-21 | End: 2019-08-21

## 2019-08-21 RX ADMIN — OXYCODONE HYDROCHLORIDE 20 MG: 20 TABLET, FILM COATED, EXTENDED RELEASE ORAL at 08:04

## 2019-08-21 RX ADMIN — PANTOPRAZOLE SODIUM 40 MG: 40 TABLET, DELAYED RELEASE ORAL at 05:02

## 2019-08-21 RX ADMIN — SODIUM CHLORIDE, PRESERVATIVE FREE 10 ML: 5 INJECTION INTRAVENOUS at 08:05

## 2019-08-21 RX ADMIN — IPRATROPIUM BROMIDE AND ALBUTEROL SULFATE 3 ML: .5; 3 SOLUTION RESPIRATORY (INHALATION) at 06:59

## 2019-08-21 RX ADMIN — OXYCODONE HYDROCHLORIDE 20 MG: 20 TABLET, FILM COATED, EXTENDED RELEASE ORAL at 20:15

## 2019-08-21 RX ADMIN — LACTULOSE 20 G: 20 SOLUTION ORAL at 17:30

## 2019-08-21 RX ADMIN — CETIRIZINE HYDROCHLORIDE 5 MG: 10 TABLET, FILM COATED ORAL at 08:04

## 2019-08-21 RX ADMIN — FLECAINIDE ACETATE 50 MG: 50 TABLET ORAL at 08:04

## 2019-08-21 RX ADMIN — DRONABINOL 2.5 MG: 2.5 CAPSULE ORAL at 17:06

## 2019-08-21 RX ADMIN — DRONABINOL 2.5 MG: 2.5 CAPSULE ORAL at 08:03

## 2019-08-21 RX ADMIN — ALLOPURINOL 100 MG: 100 TABLET ORAL at 20:05

## 2019-08-21 RX ADMIN — FLUOXETINE 40 MG: 20 CAPSULE ORAL at 05:01

## 2019-08-21 RX ADMIN — APIXABAN 5 MG: 5 TABLET, FILM COATED ORAL at 20:05

## 2019-08-21 RX ADMIN — IPRATROPIUM BROMIDE AND ALBUTEROL SULFATE 3 ML: .5; 3 SOLUTION RESPIRATORY (INHALATION) at 19:47

## 2019-08-21 RX ADMIN — ALLOPURINOL 100 MG: 100 TABLET ORAL at 08:04

## 2019-08-21 RX ADMIN — MONTELUKAST SODIUM 10 MG: 10 TABLET, COATED ORAL at 20:05

## 2019-08-21 RX ADMIN — APIXABAN 5 MG: 5 TABLET, FILM COATED ORAL at 08:04

## 2019-08-21 RX ADMIN — FLECAINIDE ACETATE 50 MG: 50 TABLET ORAL at 20:05

## 2019-08-21 RX ADMIN — ONDANSETRON 8 MG: 4 TABLET, FILM COATED ORAL at 05:02

## 2019-08-21 RX ADMIN — SENNOSIDES, DOCUSATE SODIUM 2 TABLET: 50; 8.6 TABLET, FILM COATED ORAL at 20:05

## 2019-08-21 RX ADMIN — BUDESONIDE 0.5 MG: 0.5 INHALANT RESPIRATORY (INHALATION) at 06:59

## 2019-08-21 RX ADMIN — ONDANSETRON 8 MG: 4 TABLET, FILM COATED ORAL at 20:04

## 2019-08-21 RX ADMIN — DIPHENHYDRAMINE HYDROCHLORIDE 25 MG: 25 CAPSULE ORAL at 20:15

## 2019-08-21 RX ADMIN — POLYETHYLENE GLYCOL (3350) 17 G: 17 POWDER, FOR SOLUTION ORAL at 08:04

## 2019-08-21 RX ADMIN — CHOLECALCIFEROL TAB 10 MCG (400 UNIT) 1000 UNITS: 10 TAB at 08:04

## 2019-08-21 RX ADMIN — PROCHLORPERAZINE MALEATE 10 MG: 10 TABLET ORAL at 08:09

## 2019-08-21 RX ADMIN — INSULIN ASPART 2 UNITS: 100 INJECTION, SOLUTION INTRAVENOUS; SUBCUTANEOUS at 20:15

## 2019-08-21 RX ADMIN — IPRATROPIUM BROMIDE AND ALBUTEROL SULFATE 3 ML: .5; 3 SOLUTION RESPIRATORY (INHALATION) at 12:47

## 2019-08-21 RX ADMIN — ONDANSETRON 8 MG: 4 TABLET, FILM COATED ORAL at 16:54

## 2019-08-21 RX ADMIN — SODIUM CHLORIDE, PRESERVATIVE FREE 10 ML: 5 INJECTION INTRAVENOUS at 20:17

## 2019-08-21 RX ADMIN — BUDESONIDE 0.5 MG: 0.5 INHALANT RESPIRATORY (INHALATION) at 19:47

## 2019-08-21 RX ADMIN — ONDANSETRON 8 MG: 4 TABLET, FILM COATED ORAL at 10:51

## 2019-08-22 LAB
ANION GAP SERPL CALCULATED.3IONS-SCNC: 9.2 MMOL/L (ref 5–15)
ANISOCYTOSIS BLD QL: ABNORMAL
BUN BLD-MCNC: 11 MG/DL (ref 8–23)
BUN/CREAT SERPL: 10.9 (ref 7–25)
CALCIUM SPEC-SCNC: 9.5 MG/DL (ref 8.6–10.5)
CHLORIDE SERPL-SCNC: 100 MMOL/L (ref 98–107)
CO2 SERPL-SCNC: 27.8 MMOL/L (ref 22–29)
CREAT BLD-MCNC: 1.01 MG/DL (ref 0.76–1.27)
CRP SERPL-MCNC: 1.98 MG/DL (ref 0–0.5)
DEPRECATED RDW RBC AUTO: 57.3 FL (ref 37–54)
EOSINOPHIL # BLD MANUAL: 0.17 10*3/MM3 (ref 0–0.4)
EOSINOPHIL NFR BLD MANUAL: 2 % (ref 0.3–6.2)
ERYTHROCYTE [DISTWIDTH] IN BLOOD BY AUTOMATED COUNT: 18.1 % (ref 12.3–15.4)
GFR SERPL CREATININE-BSD FRML MDRD: 73 ML/MIN/1.73
GLUCOSE BLD-MCNC: 113 MG/DL (ref 65–99)
GLUCOSE BLDC GLUCOMTR-MCNC: 127 MG/DL (ref 70–130)
GLUCOSE BLDC GLUCOMTR-MCNC: 139 MG/DL (ref 70–130)
GLUCOSE BLDC GLUCOMTR-MCNC: 153 MG/DL (ref 70–130)
GLUCOSE BLDC GLUCOMTR-MCNC: 194 MG/DL (ref 70–130)
HCT VFR BLD AUTO: 24.8 % (ref 37.5–51)
HGB BLD-MCNC: 7.6 G/DL (ref 13–17.7)
HYPOCHROMIA BLD QL: ABNORMAL
LYMPHOCYTES # BLD MANUAL: 1.37 10*3/MM3 (ref 0.7–3.1)
LYMPHOCYTES NFR BLD MANUAL: 16 % (ref 19.6–45.3)
LYMPHOCYTES NFR BLD MANUAL: 9 % (ref 5–12)
MCH RBC QN AUTO: 26.6 PG (ref 26.6–33)
MCHC RBC AUTO-ENTMCNC: 30.6 G/DL (ref 31.5–35.7)
MCV RBC AUTO: 86.7 FL (ref 79–97)
MONOCYTES # BLD AUTO: 0.77 10*3/MM3 (ref 0.1–0.9)
NEUTROPHILS # BLD AUTO: 6.23 10*3/MM3 (ref 1.7–7)
NEUTROPHILS NFR BLD MANUAL: 68 % (ref 42.7–76)
NEUTS BAND NFR BLD MANUAL: 5 % (ref 0–5)
OVALOCYTES BLD QL SMEAR: ABNORMAL
PLAT MORPH BLD: NORMAL
PLATELET # BLD AUTO: 399 10*3/MM3 (ref 140–450)
PMV BLD AUTO: 10 FL (ref 6–12)
POTASSIUM BLD-SCNC: 3.8 MMOL/L (ref 3.5–5.2)
POTASSIUM BLD-SCNC: 4.8 MMOL/L (ref 3.5–5.2)
RBC # BLD AUTO: 2.86 10*6/MM3 (ref 4.14–5.8)
SCAN SLIDE: NORMAL
SODIUM BLD-SCNC: 137 MMOL/L (ref 136–145)
TROPONIN T SERPL-MCNC: <0.01 NG/ML (ref 0–0.03)
WBC NRBC COR # BLD: 8.54 10*3/MM3 (ref 3.4–10.8)

## 2019-08-22 PROCEDURE — 93010 ELECTROCARDIOGRAM REPORT: CPT | Performed by: INTERNAL MEDICINE

## 2019-08-22 PROCEDURE — 63710000001 DRONABINOL PER 2.5 MG: Performed by: INTERNAL MEDICINE

## 2019-08-22 PROCEDURE — 25010000003 POTASSIUM CHLORIDE 10 MEQ/100ML SOLUTION: Performed by: INTERNAL MEDICINE

## 2019-08-22 PROCEDURE — 94799 UNLISTED PULMONARY SVC/PX: CPT

## 2019-08-22 PROCEDURE — 99232 SBSQ HOSP IP/OBS MODERATE 35: CPT | Performed by: INTERNAL MEDICINE

## 2019-08-22 PROCEDURE — 86140 C-REACTIVE PROTEIN: CPT | Performed by: NURSE PRACTITIONER

## 2019-08-22 PROCEDURE — 93005 ELECTROCARDIOGRAM TRACING: CPT | Performed by: INTERNAL MEDICINE

## 2019-08-22 PROCEDURE — 97530 THERAPEUTIC ACTIVITIES: CPT

## 2019-08-22 PROCEDURE — 85007 BL SMEAR W/DIFF WBC COUNT: CPT | Performed by: INTERNAL MEDICINE

## 2019-08-22 PROCEDURE — 99233 SBSQ HOSP IP/OBS HIGH 50: CPT | Performed by: INTERNAL MEDICINE

## 2019-08-22 PROCEDURE — 97116 GAIT TRAINING THERAPY: CPT

## 2019-08-22 PROCEDURE — 84484 ASSAY OF TROPONIN QUANT: CPT | Performed by: INTERNAL MEDICINE

## 2019-08-22 PROCEDURE — 84132 ASSAY OF SERUM POTASSIUM: CPT | Performed by: INTERNAL MEDICINE

## 2019-08-22 PROCEDURE — 82962 GLUCOSE BLOOD TEST: CPT

## 2019-08-22 PROCEDURE — 63710000001 PROCHLORPERAZINE MALEATE PER 10 MG: Performed by: HOSPITALIST

## 2019-08-22 PROCEDURE — 80048 BASIC METABOLIC PNL TOTAL CA: CPT | Performed by: INTERNAL MEDICINE

## 2019-08-22 PROCEDURE — 85025 COMPLETE CBC W/AUTO DIFF WBC: CPT | Performed by: INTERNAL MEDICINE

## 2019-08-22 PROCEDURE — 63710000001 INSULIN ASPART PER 5 UNITS: Performed by: NURSE PRACTITIONER

## 2019-08-22 RX ORDER — BUMETANIDE 0.25 MG/ML
1 INJECTION INTRAMUSCULAR; INTRAVENOUS ONCE
Status: DISCONTINUED | OUTPATIENT
Start: 2019-08-22 | End: 2019-08-22

## 2019-08-22 RX ORDER — POTASSIUM CHLORIDE 7.45 MG/ML
10 INJECTION INTRAVENOUS
Status: COMPLETED | OUTPATIENT
Start: 2019-08-22 | End: 2019-08-22

## 2019-08-22 RX ORDER — LACTULOSE 10 G/15ML
20 SOLUTION ORAL ONCE
Status: COMPLETED | OUTPATIENT
Start: 2019-08-22 | End: 2019-08-22

## 2019-08-22 RX ORDER — BISACODYL 10 MG
10 SUPPOSITORY, RECTAL RECTAL ONCE
Status: COMPLETED | OUTPATIENT
Start: 2019-08-22 | End: 2019-08-22

## 2019-08-22 RX ADMIN — FLECAINIDE ACETATE 50 MG: 50 TABLET ORAL at 08:41

## 2019-08-22 RX ADMIN — BUDESONIDE 0.5 MG: 0.5 INHALANT RESPIRATORY (INHALATION) at 19:49

## 2019-08-22 RX ADMIN — LACTULOSE 20 G: 20 SOLUTION ORAL at 15:31

## 2019-08-22 RX ADMIN — ONDANSETRON 8 MG: 4 TABLET, FILM COATED ORAL at 08:37

## 2019-08-22 RX ADMIN — PANTOPRAZOLE SODIUM 40 MG: 40 TABLET, DELAYED RELEASE ORAL at 08:36

## 2019-08-22 RX ADMIN — OXYCODONE HYDROCHLORIDE 20 MG: 20 TABLET, FILM COATED, EXTENDED RELEASE ORAL at 09:13

## 2019-08-22 RX ADMIN — IPRATROPIUM BROMIDE AND ALBUTEROL SULFATE 3 ML: .5; 3 SOLUTION RESPIRATORY (INHALATION) at 19:49

## 2019-08-22 RX ADMIN — SENNOSIDES, DOCUSATE SODIUM 2 TABLET: 50; 8.6 TABLET, FILM COATED ORAL at 20:38

## 2019-08-22 RX ADMIN — APIXABAN 5 MG: 5 TABLET, FILM COATED ORAL at 08:38

## 2019-08-22 RX ADMIN — SODIUM CHLORIDE, PRESERVATIVE FREE 10 ML: 5 INJECTION INTRAVENOUS at 20:40

## 2019-08-22 RX ADMIN — PROCHLORPERAZINE MALEATE 10 MG: 10 TABLET ORAL at 10:19

## 2019-08-22 RX ADMIN — ONDANSETRON 8 MG: 4 TABLET, FILM COATED ORAL at 16:59

## 2019-08-22 RX ADMIN — CETIRIZINE HYDROCHLORIDE 5 MG: 10 TABLET, FILM COATED ORAL at 08:37

## 2019-08-22 RX ADMIN — BISACODYL 10 MG RECTAL SUPPOSITORY 10 MG: at 15:31

## 2019-08-22 RX ADMIN — ALLOPURINOL 100 MG: 100 TABLET ORAL at 08:38

## 2019-08-22 RX ADMIN — INSULIN ASPART 2 UNITS: 100 INJECTION, SOLUTION INTRAVENOUS; SUBCUTANEOUS at 16:59

## 2019-08-22 RX ADMIN — CHOLECALCIFEROL TAB 10 MCG (400 UNIT) 1000 UNITS: 10 TAB at 08:37

## 2019-08-22 RX ADMIN — APIXABAN 5 MG: 5 TABLET, FILM COATED ORAL at 20:38

## 2019-08-22 RX ADMIN — POTASSIUM CHLORIDE 10 MEQ: 10 INJECTION, SOLUTION INTRAVENOUS at 15:31

## 2019-08-22 RX ADMIN — IPRATROPIUM BROMIDE AND ALBUTEROL SULFATE 3 ML: .5; 3 SOLUTION RESPIRATORY (INHALATION) at 01:04

## 2019-08-22 RX ADMIN — BUDESONIDE 0.5 MG: 0.5 INHALANT RESPIRATORY (INHALATION) at 07:03

## 2019-08-22 RX ADMIN — IPRATROPIUM BROMIDE AND ALBUTEROL SULFATE 3 ML: .5; 3 SOLUTION RESPIRATORY (INHALATION) at 11:42

## 2019-08-22 RX ADMIN — ONDANSETRON 8 MG: 4 TABLET, FILM COATED ORAL at 12:22

## 2019-08-22 RX ADMIN — SODIUM CHLORIDE, PRESERVATIVE FREE 10 ML: 5 INJECTION INTRAVENOUS at 08:42

## 2019-08-22 RX ADMIN — POTASSIUM CHLORIDE 10 MEQ: 10 INJECTION, SOLUTION INTRAVENOUS at 17:00

## 2019-08-22 RX ADMIN — FLUOXETINE 40 MG: 20 CAPSULE ORAL at 08:38

## 2019-08-22 RX ADMIN — DRONABINOL 2.5 MG: 2.5 CAPSULE ORAL at 08:36

## 2019-08-22 RX ADMIN — ALLOPURINOL 100 MG: 100 TABLET ORAL at 20:38

## 2019-08-22 RX ADMIN — MONTELUKAST SODIUM 10 MG: 10 TABLET, COATED ORAL at 20:38

## 2019-08-22 RX ADMIN — FLECAINIDE ACETATE 50 MG: 50 TABLET ORAL at 20:40

## 2019-08-22 RX ADMIN — OXYCODONE HYDROCHLORIDE 20 MG: 20 TABLET, FILM COATED, EXTENDED RELEASE ORAL at 20:38

## 2019-08-23 ENCOUNTER — APPOINTMENT (OUTPATIENT)
Dept: GENERAL RADIOLOGY | Facility: HOSPITAL | Age: 69
End: 2019-08-23

## 2019-08-23 LAB
ALBUMIN SERPL-MCNC: 2.6 G/DL (ref 3.5–5.2)
ALBUMIN/GLOB SERPL: 0.7 G/DL
ALP SERPL-CCNC: 130 U/L (ref 39–117)
ALT SERPL W P-5'-P-CCNC: 9 U/L (ref 1–41)
ANION GAP SERPL CALCULATED.3IONS-SCNC: 12.7 MMOL/L (ref 5–15)
ANISOCYTOSIS BLD QL: ABNORMAL
AST SERPL-CCNC: 21 U/L (ref 1–40)
BILIRUB SERPL-MCNC: 0.4 MG/DL (ref 0.2–1.2)
BUN BLD-MCNC: 11 MG/DL (ref 8–23)
BUN/CREAT SERPL: 10.7 (ref 7–25)
CALCIUM SPEC-SCNC: 9.7 MG/DL (ref 8.6–10.5)
CHLORIDE SERPL-SCNC: 98 MMOL/L (ref 98–107)
CO2 SERPL-SCNC: 25.3 MMOL/L (ref 22–29)
CREAT BLD-MCNC: 1.03 MG/DL (ref 0.76–1.27)
CRP SERPL-MCNC: 1.47 MG/DL (ref 0–0.5)
DEPRECATED RDW RBC AUTO: 58.2 FL (ref 37–54)
ERYTHROCYTE [DISTWIDTH] IN BLOOD BY AUTOMATED COUNT: 18.4 % (ref 12.3–15.4)
GFR SERPL CREATININE-BSD FRML MDRD: 71 ML/MIN/1.73
GLOBULIN UR ELPH-MCNC: 4 GM/DL
GLUCOSE BLD-MCNC: 143 MG/DL (ref 65–99)
GLUCOSE BLDC GLUCOMTR-MCNC: 104 MG/DL (ref 70–130)
GLUCOSE BLDC GLUCOMTR-MCNC: 122 MG/DL (ref 70–130)
GLUCOSE BLDC GLUCOMTR-MCNC: 141 MG/DL (ref 70–130)
GLUCOSE BLDC GLUCOMTR-MCNC: 169 MG/DL (ref 70–130)
HCT VFR BLD AUTO: 29.3 % (ref 37.5–51)
HGB BLD-MCNC: 8.6 G/DL (ref 13–17.7)
LYMPHOCYTES # BLD MANUAL: 1.78 10*3/MM3 (ref 0.7–3.1)
LYMPHOCYTES NFR BLD MANUAL: 11 % (ref 19.6–45.3)
LYMPHOCYTES NFR BLD MANUAL: 4 % (ref 5–12)
MCH RBC QN AUTO: 26.4 PG (ref 26.6–33)
MCHC RBC AUTO-ENTMCNC: 29.4 G/DL (ref 31.5–35.7)
MCV RBC AUTO: 89.9 FL (ref 79–97)
MONOCYTES # BLD AUTO: 0.65 10*3/MM3 (ref 0.1–0.9)
MYELOCYTES NFR BLD MANUAL: 2 % (ref 0–0)
NEUTROPHILS # BLD AUTO: 13.46 10*3/MM3 (ref 1.7–7)
NEUTROPHILS NFR BLD MANUAL: 81 % (ref 42.7–76)
NEUTS BAND NFR BLD MANUAL: 2 % (ref 0–5)
NT-PROBNP SERPL-MCNC: 153.7 PG/ML (ref 5–900)
OVALOCYTES BLD QL SMEAR: ABNORMAL
PLATELET # BLD AUTO: 483 10*3/MM3 (ref 140–450)
PMV BLD AUTO: 10 FL (ref 6–12)
POTASSIUM BLD-SCNC: 4.6 MMOL/L (ref 3.5–5.2)
PROT SERPL-MCNC: 6.6 G/DL (ref 6–8.5)
RBC # BLD AUTO: 3.26 10*6/MM3 (ref 4.14–5.8)
SCAN SLIDE: NORMAL
SCHISTOCYTES BLD QL SMEAR: ABNORMAL
SMALL PLATELETS BLD QL SMEAR: ABNORMAL
SODIUM BLD-SCNC: 136 MMOL/L (ref 136–145)
WBC NRBC COR # BLD: 16.22 10*3/MM3 (ref 3.4–10.8)

## 2019-08-23 PROCEDURE — 71045 X-RAY EXAM CHEST 1 VIEW: CPT

## 2019-08-23 PROCEDURE — 83880 ASSAY OF NATRIURETIC PEPTIDE: CPT | Performed by: INTERNAL MEDICINE

## 2019-08-23 PROCEDURE — 86140 C-REACTIVE PROTEIN: CPT | Performed by: NURSE PRACTITIONER

## 2019-08-23 PROCEDURE — 94799 UNLISTED PULMONARY SVC/PX: CPT

## 2019-08-23 PROCEDURE — 82962 GLUCOSE BLOOD TEST: CPT

## 2019-08-23 PROCEDURE — 63710000001 INSULIN ASPART PER 5 UNITS: Performed by: NURSE PRACTITIONER

## 2019-08-23 PROCEDURE — 80053 COMPREHEN METABOLIC PANEL: CPT | Performed by: INTERNAL MEDICINE

## 2019-08-23 PROCEDURE — 63710000001 PROCHLORPERAZINE MALEATE PER 10 MG: Performed by: HOSPITALIST

## 2019-08-23 PROCEDURE — 25010000002 MORPHINE PER 10 MG: Performed by: INTERNAL MEDICINE

## 2019-08-23 PROCEDURE — 85025 COMPLETE CBC W/AUTO DIFF WBC: CPT | Performed by: INTERNAL MEDICINE

## 2019-08-23 PROCEDURE — 85007 BL SMEAR W/DIFF WBC COUNT: CPT | Performed by: INTERNAL MEDICINE

## 2019-08-23 PROCEDURE — 99232 SBSQ HOSP IP/OBS MODERATE 35: CPT | Performed by: INTERNAL MEDICINE

## 2019-08-23 PROCEDURE — 71045 X-RAY EXAM CHEST 1 VIEW: CPT | Performed by: RADIOLOGY

## 2019-08-23 RX ORDER — BUMETANIDE 0.25 MG/ML
0.5 INJECTION INTRAMUSCULAR; INTRAVENOUS ONCE
Status: DISCONTINUED | OUTPATIENT
Start: 2019-08-23 | End: 2019-08-24

## 2019-08-23 RX ORDER — TAMSULOSIN HYDROCHLORIDE 0.4 MG/1
0.4 CAPSULE ORAL DAILY
Status: DISCONTINUED | OUTPATIENT
Start: 2019-08-23 | End: 2019-09-05 | Stop reason: HOSPADM

## 2019-08-23 RX ADMIN — ONDANSETRON 8 MG: 4 TABLET, FILM COATED ORAL at 11:13

## 2019-08-23 RX ADMIN — IPRATROPIUM BROMIDE AND ALBUTEROL SULFATE 3 ML: .5; 3 SOLUTION RESPIRATORY (INHALATION) at 13:14

## 2019-08-23 RX ADMIN — INSULIN ASPART 2 UNITS: 100 INJECTION, SOLUTION INTRAVENOUS; SUBCUTANEOUS at 12:51

## 2019-08-23 RX ADMIN — OXYCODONE HYDROCHLORIDE 10 MG: 5 TABLET ORAL at 18:40

## 2019-08-23 RX ADMIN — POLYETHYLENE GLYCOL (3350) 17 G: 17 POWDER, FOR SOLUTION ORAL at 08:51

## 2019-08-23 RX ADMIN — APIXABAN 5 MG: 5 TABLET, FILM COATED ORAL at 08:50

## 2019-08-23 RX ADMIN — TAMSULOSIN HYDROCHLORIDE 0.4 MG: 0.4 CAPSULE ORAL at 21:16

## 2019-08-23 RX ADMIN — ONDANSETRON 8 MG: 4 TABLET, FILM COATED ORAL at 16:47

## 2019-08-23 RX ADMIN — CETIRIZINE HYDROCHLORIDE 5 MG: 10 TABLET, FILM COATED ORAL at 08:51

## 2019-08-23 RX ADMIN — PANTOPRAZOLE SODIUM 40 MG: 40 TABLET, DELAYED RELEASE ORAL at 06:09

## 2019-08-23 RX ADMIN — BUDESONIDE 0.5 MG: 0.5 INHALANT RESPIRATORY (INHALATION) at 19:03

## 2019-08-23 RX ADMIN — SENNOSIDES, DOCUSATE SODIUM 2 TABLET: 50; 8.6 TABLET, FILM COATED ORAL at 21:16

## 2019-08-23 RX ADMIN — PROCHLORPERAZINE MALEATE 10 MG: 10 TABLET ORAL at 18:40

## 2019-08-23 RX ADMIN — SODIUM CHLORIDE, PRESERVATIVE FREE 10 ML: 5 INJECTION INTRAVENOUS at 21:16

## 2019-08-23 RX ADMIN — IPRATROPIUM BROMIDE AND ALBUTEROL SULFATE 3 ML: .5; 3 SOLUTION RESPIRATORY (INHALATION) at 07:47

## 2019-08-23 RX ADMIN — PROCHLORPERAZINE MALEATE 10 MG: 10 TABLET ORAL at 08:48

## 2019-08-23 RX ADMIN — BUDESONIDE 0.5 MG: 0.5 INHALANT RESPIRATORY (INHALATION) at 07:47

## 2019-08-23 RX ADMIN — ONDANSETRON 8 MG: 4 TABLET, FILM COATED ORAL at 06:10

## 2019-08-23 RX ADMIN — ALLOPURINOL 100 MG: 100 TABLET ORAL at 08:50

## 2019-08-23 RX ADMIN — ALLOPURINOL 100 MG: 100 TABLET ORAL at 21:15

## 2019-08-23 RX ADMIN — FLECAINIDE ACETATE 50 MG: 50 TABLET ORAL at 08:51

## 2019-08-23 RX ADMIN — APIXABAN 5 MG: 5 TABLET, FILM COATED ORAL at 21:15

## 2019-08-23 RX ADMIN — FLECAINIDE ACETATE 50 MG: 50 TABLET ORAL at 21:15

## 2019-08-23 RX ADMIN — OXYCODONE HYDROCHLORIDE 20 MG: 20 TABLET, FILM COATED, EXTENDED RELEASE ORAL at 21:20

## 2019-08-23 RX ADMIN — SODIUM CHLORIDE, PRESERVATIVE FREE 10 ML: 5 INJECTION INTRAVENOUS at 08:56

## 2019-08-23 RX ADMIN — CHOLECALCIFEROL TAB 10 MCG (400 UNIT) 1000 UNITS: 10 TAB at 08:51

## 2019-08-23 RX ADMIN — OXYCODONE HYDROCHLORIDE 20 MG: 20 TABLET, FILM COATED, EXTENDED RELEASE ORAL at 08:52

## 2019-08-23 RX ADMIN — FLUOXETINE 40 MG: 20 CAPSULE ORAL at 06:09

## 2019-08-23 RX ADMIN — MONTELUKAST SODIUM 10 MG: 10 TABLET, COATED ORAL at 21:16

## 2019-08-23 RX ADMIN — MORPHINE SULFATE 2 MG: 2 INJECTION, SOLUTION INTRAMUSCULAR; INTRAVENOUS at 06:17

## 2019-08-23 RX ADMIN — IPRATROPIUM BROMIDE AND ALBUTEROL SULFATE 3 ML: .5; 3 SOLUTION RESPIRATORY (INHALATION) at 19:03

## 2019-08-24 LAB
ALBUMIN SERPL-MCNC: 2.72 G/DL (ref 3.5–5.2)
ALBUMIN/GLOB SERPL: 0.7 G/DL
ALP SERPL-CCNC: 122 U/L (ref 39–117)
ALT SERPL W P-5'-P-CCNC: 7 U/L (ref 1–41)
ANION GAP SERPL CALCULATED.3IONS-SCNC: 11.4 MMOL/L (ref 5–15)
AST SERPL-CCNC: 17 U/L (ref 1–40)
BASOPHILS # BLD AUTO: 0.09 10*3/MM3 (ref 0–0.2)
BASOPHILS NFR BLD AUTO: 1 % (ref 0–1.5)
BILIRUB SERPL-MCNC: 0.4 MG/DL (ref 0.2–1.2)
BUN BLD-MCNC: 14 MG/DL (ref 8–23)
BUN/CREAT SERPL: 11.6 (ref 7–25)
CALCIUM SPEC-SCNC: 9.5 MG/DL (ref 8.6–10.5)
CHLORIDE SERPL-SCNC: 99 MMOL/L (ref 98–107)
CO2 SERPL-SCNC: 26.6 MMOL/L (ref 22–29)
CORTIS SERPL-MCNC: 18.98 MCG/DL
CORTIS SERPL-MCNC: 27.72 MCG/DL
CORTIS SERPL-MCNC: 37.25 MCG/DL
CREAT BLD-MCNC: 1.21 MG/DL (ref 0.76–1.27)
CRP SERPL-MCNC: 1.69 MG/DL (ref 0–0.5)
D-LACTATE SERPL-SCNC: 1.3 MMOL/L (ref 0.5–2)
DEPRECATED RDW RBC AUTO: 59.6 FL (ref 37–54)
EOSINOPHIL # BLD AUTO: 0.25 10*3/MM3 (ref 0–0.4)
EOSINOPHIL NFR BLD AUTO: 2.9 % (ref 0.3–6.2)
ERYTHROCYTE [DISTWIDTH] IN BLOOD BY AUTOMATED COUNT: 18.7 % (ref 12.3–15.4)
GFR SERPL CREATININE-BSD FRML MDRD: 59 ML/MIN/1.73
GLOBULIN UR ELPH-MCNC: 3.9 GM/DL
GLUCOSE BLD-MCNC: 119 MG/DL (ref 65–99)
GLUCOSE BLDC GLUCOMTR-MCNC: 115 MG/DL (ref 70–130)
GLUCOSE BLDC GLUCOMTR-MCNC: 133 MG/DL (ref 70–130)
GLUCOSE BLDC GLUCOMTR-MCNC: 161 MG/DL (ref 70–130)
GLUCOSE BLDC GLUCOMTR-MCNC: 221 MG/DL (ref 70–130)
HCT VFR BLD AUTO: 28.1 % (ref 37.5–51)
HGB BLD-MCNC: 8.3 G/DL (ref 13–17.7)
IMM GRANULOCYTES # BLD AUTO: 0.26 10*3/MM3 (ref 0–0.05)
IMM GRANULOCYTES NFR BLD AUTO: 3 % (ref 0–0.5)
LYMPHOCYTES # BLD AUTO: 1.47 10*3/MM3 (ref 0.7–3.1)
LYMPHOCYTES NFR BLD AUTO: 16.9 % (ref 19.6–45.3)
MCH RBC QN AUTO: 26.8 PG (ref 26.6–33)
MCHC RBC AUTO-ENTMCNC: 29.5 G/DL (ref 31.5–35.7)
MCV RBC AUTO: 90.6 FL (ref 79–97)
MONOCYTES # BLD AUTO: 0.89 10*3/MM3 (ref 0.1–0.9)
MONOCYTES NFR BLD AUTO: 10.2 % (ref 5–12)
NEUTROPHILS # BLD AUTO: 5.73 10*3/MM3 (ref 1.7–7)
NEUTROPHILS NFR BLD AUTO: 66 % (ref 42.7–76)
PHOSPHATE SERPL-MCNC: 4.6 MG/DL (ref 2.5–4.5)
PLATELET # BLD AUTO: 388 10*3/MM3 (ref 140–450)
PMV BLD AUTO: 9.7 FL (ref 6–12)
POTASSIUM BLD-SCNC: 4.4 MMOL/L (ref 3.5–5.2)
PROT SERPL-MCNC: 6.6 G/DL (ref 6–8.5)
RBC # BLD AUTO: 3.1 10*6/MM3 (ref 4.14–5.8)
SODIUM BLD-SCNC: 137 MMOL/L (ref 136–145)
WBC NRBC COR # BLD: 8.69 10*3/MM3 (ref 3.4–10.8)

## 2019-08-24 PROCEDURE — 25010000002 COSYNTROPIN PER 0.25 MG: Performed by: INTERNAL MEDICINE

## 2019-08-24 PROCEDURE — 82962 GLUCOSE BLOOD TEST: CPT

## 2019-08-24 PROCEDURE — 63710000001 PROCHLORPERAZINE MALEATE PER 10 MG: Performed by: HOSPITALIST

## 2019-08-24 PROCEDURE — 82533 TOTAL CORTISOL: CPT | Performed by: INTERNAL MEDICINE

## 2019-08-24 PROCEDURE — 80053 COMPREHEN METABOLIC PANEL: CPT | Performed by: INTERNAL MEDICINE

## 2019-08-24 PROCEDURE — 83605 ASSAY OF LACTIC ACID: CPT | Performed by: INTERNAL MEDICINE

## 2019-08-24 PROCEDURE — 94799 UNLISTED PULMONARY SVC/PX: CPT

## 2019-08-24 PROCEDURE — 63710000001 INSULIN ASPART PER 5 UNITS: Performed by: NURSE PRACTITIONER

## 2019-08-24 PROCEDURE — 99233 SBSQ HOSP IP/OBS HIGH 50: CPT | Performed by: INTERNAL MEDICINE

## 2019-08-24 PROCEDURE — 84100 ASSAY OF PHOSPHORUS: CPT | Performed by: INTERNAL MEDICINE

## 2019-08-24 PROCEDURE — 85025 COMPLETE CBC W/AUTO DIFF WBC: CPT | Performed by: INTERNAL MEDICINE

## 2019-08-24 PROCEDURE — 86140 C-REACTIVE PROTEIN: CPT | Performed by: NURSE PRACTITIONER

## 2019-08-24 RX ORDER — COSYNTROPIN 0.25 MG/ML
0.25 INJECTION, POWDER, FOR SOLUTION INTRAMUSCULAR; INTRAVENOUS ONCE
Status: COMPLETED | OUTPATIENT
Start: 2019-08-24 | End: 2019-08-24

## 2019-08-24 RX ADMIN — ALLOPURINOL 100 MG: 100 TABLET ORAL at 08:54

## 2019-08-24 RX ADMIN — SODIUM CHLORIDE, PRESERVATIVE FREE 10 ML: 5 INJECTION INTRAVENOUS at 09:20

## 2019-08-24 RX ADMIN — BUDESONIDE 0.5 MG: 0.5 INHALANT RESPIRATORY (INHALATION) at 07:59

## 2019-08-24 RX ADMIN — ONDANSETRON 8 MG: 4 TABLET, FILM COATED ORAL at 07:46

## 2019-08-24 RX ADMIN — SODIUM CHLORIDE, PRESERVATIVE FREE 10 ML: 5 INJECTION INTRAVENOUS at 20:49

## 2019-08-24 RX ADMIN — COSYNTROPIN 0.25 MG: 0.25 INJECTION, POWDER, LYOPHILIZED, FOR SOLUTION INTRAMUSCULAR; INTRAVENOUS at 11:06

## 2019-08-24 RX ADMIN — CHOLECALCIFEROL TAB 10 MCG (400 UNIT) 1000 UNITS: 10 TAB at 08:49

## 2019-08-24 RX ADMIN — POLYETHYLENE GLYCOL (3350) 17 G: 17 POWDER, FOR SOLUTION ORAL at 08:56

## 2019-08-24 RX ADMIN — PANTOPRAZOLE SODIUM 40 MG: 40 TABLET, DELAYED RELEASE ORAL at 06:00

## 2019-08-24 RX ADMIN — PROCHLORPERAZINE MALEATE 10 MG: 10 TABLET ORAL at 15:29

## 2019-08-24 RX ADMIN — IPRATROPIUM BROMIDE AND ALBUTEROL SULFATE 3 ML: .5; 3 SOLUTION RESPIRATORY (INHALATION) at 07:59

## 2019-08-24 RX ADMIN — ONDANSETRON 8 MG: 4 TABLET, FILM COATED ORAL at 17:09

## 2019-08-24 RX ADMIN — OXYCODONE HYDROCHLORIDE 10 MG: 5 TABLET ORAL at 15:22

## 2019-08-24 RX ADMIN — ALLOPURINOL 100 MG: 100 TABLET ORAL at 20:49

## 2019-08-24 RX ADMIN — OXYCODONE HYDROCHLORIDE 20 MG: 20 TABLET, FILM COATED, EXTENDED RELEASE ORAL at 21:10

## 2019-08-24 RX ADMIN — INSULIN ASPART 2 UNITS: 100 INJECTION, SOLUTION INTRAVENOUS; SUBCUTANEOUS at 21:09

## 2019-08-24 RX ADMIN — TAMSULOSIN HYDROCHLORIDE 0.4 MG: 0.4 CAPSULE ORAL at 08:53

## 2019-08-24 RX ADMIN — MONTELUKAST SODIUM 10 MG: 10 TABLET, COATED ORAL at 20:49

## 2019-08-24 RX ADMIN — ONDANSETRON 8 MG: 4 TABLET, FILM COATED ORAL at 11:14

## 2019-08-24 RX ADMIN — BUDESONIDE 0.5 MG: 0.5 INHALANT RESPIRATORY (INHALATION) at 19:19

## 2019-08-24 RX ADMIN — FLECAINIDE ACETATE 50 MG: 50 TABLET ORAL at 15:07

## 2019-08-24 RX ADMIN — FLECAINIDE ACETATE 50 MG: 50 TABLET ORAL at 20:49

## 2019-08-24 RX ADMIN — APIXABAN 5 MG: 5 TABLET, FILM COATED ORAL at 20:49

## 2019-08-24 RX ADMIN — CETIRIZINE HYDROCHLORIDE 5 MG: 10 TABLET, FILM COATED ORAL at 08:53

## 2019-08-24 RX ADMIN — SENNOSIDES, DOCUSATE SODIUM 2 TABLET: 50; 8.6 TABLET, FILM COATED ORAL at 20:49

## 2019-08-24 RX ADMIN — INSULIN ASPART 3 UNITS: 100 INJECTION, SOLUTION INTRAVENOUS; SUBCUTANEOUS at 17:09

## 2019-08-24 RX ADMIN — OXYCODONE HYDROCHLORIDE 20 MG: 20 TABLET, FILM COATED, EXTENDED RELEASE ORAL at 09:16

## 2019-08-24 RX ADMIN — FLUOXETINE 40 MG: 20 CAPSULE ORAL at 06:00

## 2019-08-24 RX ADMIN — APIXABAN 5 MG: 5 TABLET, FILM COATED ORAL at 08:54

## 2019-08-24 RX ADMIN — IPRATROPIUM BROMIDE AND ALBUTEROL SULFATE 3 ML: .5; 3 SOLUTION RESPIRATORY (INHALATION) at 19:19

## 2019-08-25 ENCOUNTER — APPOINTMENT (OUTPATIENT)
Dept: GENERAL RADIOLOGY | Facility: HOSPITAL | Age: 69
End: 2019-08-25

## 2019-08-25 LAB
ALBUMIN SERPL-MCNC: 2.79 G/DL (ref 3.5–5.2)
ALBUMIN/GLOB SERPL: 0.8 G/DL
ALP SERPL-CCNC: 115 U/L (ref 39–117)
ALT SERPL W P-5'-P-CCNC: 6 U/L (ref 1–41)
ANION GAP SERPL CALCULATED.3IONS-SCNC: 10.3 MMOL/L (ref 5–15)
AST SERPL-CCNC: 17 U/L (ref 1–40)
BASOPHILS # BLD AUTO: 0.05 10*3/MM3 (ref 0–0.2)
BASOPHILS NFR BLD AUTO: 0.6 % (ref 0–1.5)
BILIRUB SERPL-MCNC: 0.3 MG/DL (ref 0.2–1.2)
BUN BLD-MCNC: 16 MG/DL (ref 8–23)
BUN/CREAT SERPL: 13.6 (ref 7–25)
CALCIUM SPEC-SCNC: 9.2 MG/DL (ref 8.6–10.5)
CHLORIDE SERPL-SCNC: 98 MMOL/L (ref 98–107)
CO2 SERPL-SCNC: 27.7 MMOL/L (ref 22–29)
CREAT BLD-MCNC: 1.18 MG/DL (ref 0.76–1.27)
CRP SERPL-MCNC: 1.24 MG/DL (ref 0–0.5)
DEPRECATED RDW RBC AUTO: 57.3 FL (ref 37–54)
EOSINOPHIL # BLD AUTO: 0.07 10*3/MM3 (ref 0–0.4)
EOSINOPHIL NFR BLD AUTO: 0.8 % (ref 0.3–6.2)
ERYTHROCYTE [DISTWIDTH] IN BLOOD BY AUTOMATED COUNT: 18.3 % (ref 12.3–15.4)
GFR SERPL CREATININE-BSD FRML MDRD: 61 ML/MIN/1.73
GLOBULIN UR ELPH-MCNC: 3.5 GM/DL
GLUCOSE BLD-MCNC: 135 MG/DL (ref 65–99)
GLUCOSE BLDC GLUCOMTR-MCNC: 116 MG/DL (ref 70–130)
GLUCOSE BLDC GLUCOMTR-MCNC: 132 MG/DL (ref 70–130)
GLUCOSE BLDC GLUCOMTR-MCNC: 151 MG/DL (ref 70–130)
GLUCOSE BLDC GLUCOMTR-MCNC: 160 MG/DL (ref 70–130)
GLUCOSE BLDC GLUCOMTR-MCNC: 238 MG/DL (ref 70–130)
HCT VFR BLD AUTO: 26.1 % (ref 37.5–51)
HGB BLD-MCNC: 7.8 G/DL (ref 13–17.7)
IMM GRANULOCYTES # BLD AUTO: 0.16 10*3/MM3 (ref 0–0.05)
IMM GRANULOCYTES NFR BLD AUTO: 1.8 % (ref 0–0.5)
LYMPHOCYTES # BLD AUTO: 1.34 10*3/MM3 (ref 0.7–3.1)
LYMPHOCYTES NFR BLD AUTO: 14.9 % (ref 19.6–45.3)
MAGNESIUM SERPL-MCNC: 1.6 MG/DL (ref 1.6–2.4)
MCH RBC QN AUTO: 26.7 PG (ref 26.6–33)
MCHC RBC AUTO-ENTMCNC: 29.9 G/DL (ref 31.5–35.7)
MCV RBC AUTO: 89.4 FL (ref 79–97)
MONOCYTES # BLD AUTO: 0.94 10*3/MM3 (ref 0.1–0.9)
MONOCYTES NFR BLD AUTO: 10.5 % (ref 5–12)
NEUTROPHILS # BLD AUTO: 6.43 10*3/MM3 (ref 1.7–7)
NEUTROPHILS NFR BLD AUTO: 71.4 % (ref 42.7–76)
PHOSPHATE SERPL-MCNC: 4.3 MG/DL (ref 2.5–4.5)
PLATELET # BLD AUTO: 389 10*3/MM3 (ref 140–450)
PMV BLD AUTO: 10.3 FL (ref 6–12)
POTASSIUM BLD-SCNC: 4 MMOL/L (ref 3.5–5.2)
PROT SERPL-MCNC: 6.3 G/DL (ref 6–8.5)
RBC # BLD AUTO: 2.92 10*6/MM3 (ref 4.14–5.8)
SODIUM BLD-SCNC: 136 MMOL/L (ref 136–145)
WBC NRBC COR # BLD: 8.99 10*3/MM3 (ref 3.4–10.8)

## 2019-08-25 PROCEDURE — 80053 COMPREHEN METABOLIC PANEL: CPT | Performed by: INTERNAL MEDICINE

## 2019-08-25 PROCEDURE — 83735 ASSAY OF MAGNESIUM: CPT | Performed by: INTERNAL MEDICINE

## 2019-08-25 PROCEDURE — 82962 GLUCOSE BLOOD TEST: CPT

## 2019-08-25 PROCEDURE — 71045 X-RAY EXAM CHEST 1 VIEW: CPT | Performed by: RADIOLOGY

## 2019-08-25 PROCEDURE — 71045 X-RAY EXAM CHEST 1 VIEW: CPT

## 2019-08-25 PROCEDURE — 94799 UNLISTED PULMONARY SVC/PX: CPT

## 2019-08-25 PROCEDURE — 99233 SBSQ HOSP IP/OBS HIGH 50: CPT | Performed by: INTERNAL MEDICINE

## 2019-08-25 PROCEDURE — 63710000001 INSULIN ASPART PER 5 UNITS: Performed by: NURSE PRACTITIONER

## 2019-08-25 PROCEDURE — 86140 C-REACTIVE PROTEIN: CPT | Performed by: NURSE PRACTITIONER

## 2019-08-25 PROCEDURE — 63710000001 PROCHLORPERAZINE MALEATE PER 10 MG: Performed by: HOSPITALIST

## 2019-08-25 PROCEDURE — 25010000002 FUROSEMIDE PER 20 MG: Performed by: INTERNAL MEDICINE

## 2019-08-25 PROCEDURE — 85025 COMPLETE CBC W/AUTO DIFF WBC: CPT | Performed by: INTERNAL MEDICINE

## 2019-08-25 PROCEDURE — 84100 ASSAY OF PHOSPHORUS: CPT | Performed by: INTERNAL MEDICINE

## 2019-08-25 RX ORDER — MAGNESIUM SULFATE HEPTAHYDRATE 40 MG/ML
4 INJECTION, SOLUTION INTRAVENOUS ONCE
Status: COMPLETED | OUTPATIENT
Start: 2019-08-25 | End: 2019-08-25

## 2019-08-25 RX ORDER — MIDODRINE HYDROCHLORIDE 2.5 MG/1
5 TABLET ORAL
Status: DISCONTINUED | OUTPATIENT
Start: 2019-08-25 | End: 2019-09-05 | Stop reason: HOSPADM

## 2019-08-25 RX ORDER — MIDODRINE HYDROCHLORIDE 2.5 MG/1
5 TABLET ORAL
Status: DISCONTINUED | OUTPATIENT
Start: 2019-08-25 | End: 2019-08-25

## 2019-08-25 RX ORDER — FUROSEMIDE 10 MG/ML
20 INJECTION INTRAMUSCULAR; INTRAVENOUS ONCE
Status: COMPLETED | OUTPATIENT
Start: 2019-08-25 | End: 2019-08-25

## 2019-08-25 RX ORDER — MIDODRINE HYDROCHLORIDE 2.5 MG/1
2.5 TABLET ORAL
Status: DISCONTINUED | OUTPATIENT
Start: 2019-08-25 | End: 2019-08-25

## 2019-08-25 RX ADMIN — PROCHLORPERAZINE MALEATE 10 MG: 10 TABLET ORAL at 11:10

## 2019-08-25 RX ADMIN — ALLOPURINOL 100 MG: 100 TABLET ORAL at 08:49

## 2019-08-25 RX ADMIN — BUDESONIDE 0.5 MG: 0.5 INHALANT RESPIRATORY (INHALATION) at 18:49

## 2019-08-25 RX ADMIN — CHOLECALCIFEROL TAB 10 MCG (400 UNIT) 1000 UNITS: 10 TAB at 08:49

## 2019-08-25 RX ADMIN — IPRATROPIUM BROMIDE AND ALBUTEROL SULFATE 3 ML: .5; 3 SOLUTION RESPIRATORY (INHALATION) at 07:58

## 2019-08-25 RX ADMIN — IPRATROPIUM BROMIDE AND ALBUTEROL SULFATE 3 ML: .5; 3 SOLUTION RESPIRATORY (INHALATION) at 12:52

## 2019-08-25 RX ADMIN — OXYCODONE HYDROCHLORIDE 20 MG: 20 TABLET, FILM COATED, EXTENDED RELEASE ORAL at 20:40

## 2019-08-25 RX ADMIN — FLECAINIDE ACETATE 50 MG: 50 TABLET ORAL at 08:49

## 2019-08-25 RX ADMIN — ALLOPURINOL 100 MG: 100 TABLET ORAL at 20:39

## 2019-08-25 RX ADMIN — FLUOXETINE 40 MG: 20 CAPSULE ORAL at 06:07

## 2019-08-25 RX ADMIN — IPRATROPIUM BROMIDE AND ALBUTEROL SULFATE 3 ML: .5; 3 SOLUTION RESPIRATORY (INHALATION) at 18:48

## 2019-08-25 RX ADMIN — PROCHLORPERAZINE MALEATE 10 MG: 10 TABLET ORAL at 17:59

## 2019-08-25 RX ADMIN — SODIUM CHLORIDE 500 ML: 9 INJECTION, SOLUTION INTRAVENOUS at 10:07

## 2019-08-25 RX ADMIN — TAMSULOSIN HYDROCHLORIDE 0.4 MG: 0.4 CAPSULE ORAL at 08:49

## 2019-08-25 RX ADMIN — POLYETHYLENE GLYCOL (3350) 17 G: 17 POWDER, FOR SOLUTION ORAL at 08:52

## 2019-08-25 RX ADMIN — APIXABAN 5 MG: 5 TABLET, FILM COATED ORAL at 20:40

## 2019-08-25 RX ADMIN — SODIUM CHLORIDE, PRESERVATIVE FREE 10 ML: 5 INJECTION INTRAVENOUS at 08:48

## 2019-08-25 RX ADMIN — FLECAINIDE ACETATE 50 MG: 50 TABLET ORAL at 20:40

## 2019-08-25 RX ADMIN — APIXABAN 5 MG: 5 TABLET, FILM COATED ORAL at 08:50

## 2019-08-25 RX ADMIN — ONDANSETRON 8 MG: 4 TABLET, FILM COATED ORAL at 16:52

## 2019-08-25 RX ADMIN — CETIRIZINE HYDROCHLORIDE 5 MG: 10 TABLET, FILM COATED ORAL at 08:49

## 2019-08-25 RX ADMIN — ONDANSETRON 8 MG: 4 TABLET, FILM COATED ORAL at 11:40

## 2019-08-25 RX ADMIN — BUDESONIDE 0.5 MG: 0.5 INHALANT RESPIRATORY (INHALATION) at 08:09

## 2019-08-25 RX ADMIN — FUROSEMIDE 20 MG: 10 INJECTION, SOLUTION INTRAVENOUS at 15:54

## 2019-08-25 RX ADMIN — ONDANSETRON 8 MG: 4 TABLET, FILM COATED ORAL at 07:55

## 2019-08-25 RX ADMIN — INSULIN ASPART 2 UNITS: 100 INJECTION, SOLUTION INTRAVENOUS; SUBCUTANEOUS at 11:40

## 2019-08-25 RX ADMIN — SENNOSIDES, DOCUSATE SODIUM 2 TABLET: 50; 8.6 TABLET, FILM COATED ORAL at 20:39

## 2019-08-25 RX ADMIN — SODIUM CHLORIDE, PRESERVATIVE FREE 10 ML: 5 INJECTION INTRAVENOUS at 20:41

## 2019-08-25 RX ADMIN — MIDODRINE HYDROCHLORIDE 5 MG: 2.5 TABLET ORAL at 16:52

## 2019-08-25 RX ADMIN — MIDODRINE HYDROCHLORIDE 2.5 MG: 2.5 TABLET ORAL at 13:12

## 2019-08-25 RX ADMIN — MAGNESIUM SULFATE HEPTAHYDRATE 4 G: 40 INJECTION, SOLUTION INTRAVENOUS at 08:57

## 2019-08-25 RX ADMIN — INSULIN ASPART 3 UNITS: 100 INJECTION, SOLUTION INTRAVENOUS; SUBCUTANEOUS at 16:52

## 2019-08-25 RX ADMIN — MONTELUKAST SODIUM 10 MG: 10 TABLET, COATED ORAL at 20:39

## 2019-08-25 RX ADMIN — PANTOPRAZOLE SODIUM 40 MG: 40 TABLET, DELAYED RELEASE ORAL at 06:07

## 2019-08-25 RX ADMIN — OXYCODONE HYDROCHLORIDE 20 MG: 20 TABLET, FILM COATED, EXTENDED RELEASE ORAL at 08:49

## 2019-08-26 ENCOUNTER — APPOINTMENT (OUTPATIENT)
Dept: GENERAL RADIOLOGY | Facility: HOSPITAL | Age: 69
End: 2019-08-26

## 2019-08-26 LAB
ALBUMIN SERPL-MCNC: 2.75 G/DL (ref 3.5–5.2)
ANION GAP SERPL CALCULATED.3IONS-SCNC: 11.8 MMOL/L (ref 5–15)
BUN BLD-MCNC: 12 MG/DL (ref 8–23)
BUN/CREAT SERPL: 11.3 (ref 7–25)
CALCIUM SPEC-SCNC: 8.8 MG/DL (ref 8.6–10.5)
CHLORIDE SERPL-SCNC: 101 MMOL/L (ref 98–107)
CO2 SERPL-SCNC: 27.2 MMOL/L (ref 22–29)
CREAT BLD-MCNC: 1.06 MG/DL (ref 0.76–1.27)
CRP SERPL-MCNC: 0.77 MG/DL (ref 0–0.5)
DEPRECATED RDW RBC AUTO: 58.5 FL (ref 37–54)
ERYTHROCYTE [DISTWIDTH] IN BLOOD BY AUTOMATED COUNT: 18.6 % (ref 12.3–15.4)
GFR SERPL CREATININE-BSD FRML MDRD: 69 ML/MIN/1.73
GLUCOSE BLD-MCNC: 122 MG/DL (ref 65–99)
GLUCOSE BLDC GLUCOMTR-MCNC: 134 MG/DL (ref 70–130)
GLUCOSE BLDC GLUCOMTR-MCNC: 148 MG/DL (ref 70–130)
GLUCOSE BLDC GLUCOMTR-MCNC: 161 MG/DL (ref 70–130)
GLUCOSE BLDC GLUCOMTR-MCNC: 191 MG/DL (ref 70–130)
HCT VFR BLD AUTO: 25.2 % (ref 37.5–51)
HGB BLD-MCNC: 8.1 G/DL (ref 13–17.7)
MAGNESIUM SERPL-MCNC: 1.8 MG/DL (ref 1.6–2.4)
MCH RBC QN AUTO: 27.9 PG (ref 26.6–33)
MCHC RBC AUTO-ENTMCNC: 32.1 G/DL (ref 31.5–35.7)
MCV RBC AUTO: 86.9 FL (ref 79–97)
PHOSPHATE SERPL-MCNC: 3.8 MG/DL (ref 2.5–4.5)
PLATELET # BLD AUTO: 367 10*3/MM3 (ref 140–450)
PMV BLD AUTO: 9.8 FL (ref 6–12)
POTASSIUM BLD-SCNC: 3.9 MMOL/L (ref 3.5–5.2)
RBC # BLD AUTO: 2.9 10*6/MM3 (ref 4.14–5.8)
SODIUM BLD-SCNC: 140 MMOL/L (ref 136–145)
WBC NRBC COR # BLD: 7.47 10*3/MM3 (ref 3.4–10.8)

## 2019-08-26 PROCEDURE — 63710000001 DRONABINOL PER 2.5 MG: Performed by: NURSE PRACTITIONER

## 2019-08-26 PROCEDURE — 99232 SBSQ HOSP IP/OBS MODERATE 35: CPT | Performed by: INTERNAL MEDICINE

## 2019-08-26 PROCEDURE — 85027 COMPLETE CBC AUTOMATED: CPT | Performed by: INTERNAL MEDICINE

## 2019-08-26 PROCEDURE — 94799 UNLISTED PULMONARY SVC/PX: CPT

## 2019-08-26 PROCEDURE — 86140 C-REACTIVE PROTEIN: CPT | Performed by: NURSE PRACTITIONER

## 2019-08-26 PROCEDURE — 80069 RENAL FUNCTION PANEL: CPT | Performed by: INTERNAL MEDICINE

## 2019-08-26 PROCEDURE — 63710000001 PROCHLORPERAZINE MALEATE PER 10 MG: Performed by: HOSPITALIST

## 2019-08-26 PROCEDURE — 82962 GLUCOSE BLOOD TEST: CPT

## 2019-08-26 PROCEDURE — 63710000001 INSULIN ASPART PER 5 UNITS: Performed by: NURSE PRACTITIONER

## 2019-08-26 PROCEDURE — 71045 X-RAY EXAM CHEST 1 VIEW: CPT

## 2019-08-26 PROCEDURE — 99233 SBSQ HOSP IP/OBS HIGH 50: CPT | Performed by: NURSE PRACTITIONER

## 2019-08-26 PROCEDURE — 83735 ASSAY OF MAGNESIUM: CPT | Performed by: INTERNAL MEDICINE

## 2019-08-26 RX ORDER — DRONABINOL 2.5 MG/1
2.5 CAPSULE ORAL 2 TIMES DAILY
Status: DISCONTINUED | OUTPATIENT
Start: 2019-08-26 | End: 2019-09-01

## 2019-08-26 RX ORDER — MAGNESIUM SULFATE HEPTAHYDRATE 40 MG/ML
4 INJECTION, SOLUTION INTRAVENOUS ONCE
Status: COMPLETED | OUTPATIENT
Start: 2019-08-26 | End: 2019-08-26

## 2019-08-26 RX ADMIN — BUDESONIDE 0.5 MG: 0.5 INHALANT RESPIRATORY (INHALATION) at 06:57

## 2019-08-26 RX ADMIN — BUDESONIDE 0.5 MG: 0.5 INHALANT RESPIRATORY (INHALATION) at 18:26

## 2019-08-26 RX ADMIN — MAGNESIUM SULFATE HEPTAHYDRATE 4 G: 40 INJECTION, SOLUTION INTRAVENOUS at 08:24

## 2019-08-26 RX ADMIN — CHOLECALCIFEROL TAB 10 MCG (400 UNIT) 1000 UNITS: 10 TAB at 08:26

## 2019-08-26 RX ADMIN — INSULIN ASPART 2 UNITS: 100 INJECTION, SOLUTION INTRAVENOUS; SUBCUTANEOUS at 20:47

## 2019-08-26 RX ADMIN — INSULIN ASPART 2 UNITS: 100 INJECTION, SOLUTION INTRAVENOUS; SUBCUTANEOUS at 11:25

## 2019-08-26 RX ADMIN — FLUOXETINE 40 MG: 20 CAPSULE ORAL at 06:17

## 2019-08-26 RX ADMIN — POLYETHYLENE GLYCOL (3350) 17 G: 17 POWDER, FOR SOLUTION ORAL at 08:25

## 2019-08-26 RX ADMIN — MIDODRINE HYDROCHLORIDE 5 MG: 2.5 TABLET ORAL at 08:26

## 2019-08-26 RX ADMIN — PANTOPRAZOLE SODIUM 40 MG: 40 TABLET, DELAYED RELEASE ORAL at 06:17

## 2019-08-26 RX ADMIN — PROCHLORPERAZINE MALEATE 10 MG: 10 TABLET ORAL at 08:29

## 2019-08-26 RX ADMIN — OXYCODONE HYDROCHLORIDE 20 MG: 20 TABLET, FILM COATED, EXTENDED RELEASE ORAL at 08:24

## 2019-08-26 RX ADMIN — IPRATROPIUM BROMIDE AND ALBUTEROL SULFATE 3 ML: .5; 3 SOLUTION RESPIRATORY (INHALATION) at 18:27

## 2019-08-26 RX ADMIN — SENNOSIDES, DOCUSATE SODIUM 2 TABLET: 50; 8.6 TABLET, FILM COATED ORAL at 20:29

## 2019-08-26 RX ADMIN — MIDODRINE HYDROCHLORIDE 5 MG: 2.5 TABLET ORAL at 11:25

## 2019-08-26 RX ADMIN — MIDODRINE HYDROCHLORIDE 5 MG: 2.5 TABLET ORAL at 17:09

## 2019-08-26 RX ADMIN — CETIRIZINE HYDROCHLORIDE 5 MG: 10 TABLET, FILM COATED ORAL at 08:25

## 2019-08-26 RX ADMIN — ALLOPURINOL 100 MG: 100 TABLET ORAL at 20:28

## 2019-08-26 RX ADMIN — IPRATROPIUM BROMIDE AND ALBUTEROL SULFATE 3 ML: .5; 3 SOLUTION RESPIRATORY (INHALATION) at 06:49

## 2019-08-26 RX ADMIN — SODIUM CHLORIDE, PRESERVATIVE FREE 10 ML: 5 INJECTION INTRAVENOUS at 20:48

## 2019-08-26 RX ADMIN — APIXABAN 5 MG: 5 TABLET, FILM COATED ORAL at 20:28

## 2019-08-26 RX ADMIN — OXYCODONE HYDROCHLORIDE 20 MG: 20 TABLET, FILM COATED, EXTENDED RELEASE ORAL at 20:28

## 2019-08-26 RX ADMIN — APIXABAN 5 MG: 5 TABLET, FILM COATED ORAL at 08:26

## 2019-08-26 RX ADMIN — ONDANSETRON 8 MG: 4 TABLET, FILM COATED ORAL at 11:25

## 2019-08-26 RX ADMIN — FLECAINIDE ACETATE 50 MG: 50 TABLET ORAL at 20:28

## 2019-08-26 RX ADMIN — MONTELUKAST SODIUM 10 MG: 10 TABLET, COATED ORAL at 20:28

## 2019-08-26 RX ADMIN — DRONABINOL 2.5 MG: 2.5 CAPSULE ORAL at 20:30

## 2019-08-26 RX ADMIN — TAMSULOSIN HYDROCHLORIDE 0.4 MG: 0.4 CAPSULE ORAL at 08:26

## 2019-08-26 RX ADMIN — FLECAINIDE ACETATE 50 MG: 50 TABLET ORAL at 08:27

## 2019-08-26 RX ADMIN — ONDANSETRON 8 MG: 4 TABLET, FILM COATED ORAL at 17:09

## 2019-08-26 RX ADMIN — SODIUM CHLORIDE, PRESERVATIVE FREE 10 ML: 5 INJECTION INTRAVENOUS at 08:24

## 2019-08-26 RX ADMIN — ONDANSETRON 8 MG: 4 TABLET, FILM COATED ORAL at 06:17

## 2019-08-26 RX ADMIN — ALLOPURINOL 100 MG: 100 TABLET ORAL at 08:26

## 2019-08-27 ENCOUNTER — APPOINTMENT (OUTPATIENT)
Dept: CT IMAGING | Facility: HOSPITAL | Age: 69
End: 2019-08-27

## 2019-08-27 LAB
BASOPHILS # BLD AUTO: 0.05 10*3/MM3 (ref 0–0.2)
BASOPHILS NFR BLD AUTO: 0.8 % (ref 0–1.5)
CK MB SERPL-CCNC: 1.11 NG/ML
CK SERPL-CCNC: 23 U/L (ref 20–200)
CRP SERPL-MCNC: 0.63 MG/DL (ref 0–0.5)
DEPRECATED RDW RBC AUTO: 59.2 FL (ref 37–54)
EOSINOPHIL # BLD AUTO: 0.21 10*3/MM3 (ref 0–0.4)
EOSINOPHIL NFR BLD AUTO: 3.3 % (ref 0.3–6.2)
ERYTHROCYTE [DISTWIDTH] IN BLOOD BY AUTOMATED COUNT: 18.7 % (ref 12.3–15.4)
GLUCOSE BLDC GLUCOMTR-MCNC: 112 MG/DL (ref 70–130)
GLUCOSE BLDC GLUCOMTR-MCNC: 120 MG/DL (ref 70–130)
GLUCOSE BLDC GLUCOMTR-MCNC: 127 MG/DL (ref 70–130)
GLUCOSE BLDC GLUCOMTR-MCNC: 172 MG/DL (ref 70–130)
HCT VFR BLD AUTO: 28 % (ref 37.5–51)
HGB BLD-MCNC: 8.3 G/DL (ref 13–17.7)
IMM GRANULOCYTES # BLD AUTO: 0.11 10*3/MM3 (ref 0–0.05)
IMM GRANULOCYTES NFR BLD AUTO: 1.7 % (ref 0–0.5)
LYMPHOCYTES # BLD AUTO: 1.18 10*3/MM3 (ref 0.7–3.1)
LYMPHOCYTES NFR BLD AUTO: 18.8 % (ref 19.6–45.3)
MAGNESIUM SERPL-MCNC: 2.7 MG/DL (ref 1.6–2.4)
MCH RBC QN AUTO: 26.7 PG (ref 26.6–33)
MCHC RBC AUTO-ENTMCNC: 29.6 G/DL (ref 31.5–35.7)
MCV RBC AUTO: 90 FL (ref 79–97)
MONOCYTES # BLD AUTO: 0.72 10*3/MM3 (ref 0.1–0.9)
MONOCYTES NFR BLD AUTO: 11.4 % (ref 5–12)
MYOGLOBIN SERPL-MCNC: 23.1 NG/ML (ref 28–72)
NEUTROPHILS # BLD AUTO: 4.02 10*3/MM3 (ref 1.7–7)
NEUTROPHILS NFR BLD AUTO: 64 % (ref 42.7–76)
PLATELET # BLD AUTO: 369 10*3/MM3 (ref 140–450)
PMV BLD AUTO: 10.3 FL (ref 6–12)
RBC # BLD AUTO: 3.11 10*6/MM3 (ref 4.14–5.8)
TROPONIN T SERPL-MCNC: <0.01 NG/ML (ref 0–0.03)
WBC NRBC COR # BLD: 6.29 10*3/MM3 (ref 3.4–10.8)

## 2019-08-27 PROCEDURE — 94799 UNLISTED PULMONARY SVC/PX: CPT

## 2019-08-27 PROCEDURE — 71250 CT THORAX DX C-: CPT | Performed by: RADIOLOGY

## 2019-08-27 PROCEDURE — 82553 CREATINE MB FRACTION: CPT | Performed by: INTERNAL MEDICINE

## 2019-08-27 PROCEDURE — 82550 ASSAY OF CK (CPK): CPT | Performed by: INTERNAL MEDICINE

## 2019-08-27 PROCEDURE — 25010000002 MORPHINE PER 10 MG: Performed by: INTERNAL MEDICINE

## 2019-08-27 PROCEDURE — 85025 COMPLETE CBC W/AUTO DIFF WBC: CPT | Performed by: INTERNAL MEDICINE

## 2019-08-27 PROCEDURE — 82962 GLUCOSE BLOOD TEST: CPT

## 2019-08-27 PROCEDURE — 97530 THERAPEUTIC ACTIVITIES: CPT

## 2019-08-27 PROCEDURE — 71250 CT THORAX DX C-: CPT

## 2019-08-27 PROCEDURE — 97110 THERAPEUTIC EXERCISES: CPT

## 2019-08-27 PROCEDURE — 86140 C-REACTIVE PROTEIN: CPT | Performed by: NURSE PRACTITIONER

## 2019-08-27 PROCEDURE — 63710000001 PROCHLORPERAZINE MALEATE PER 10 MG: Performed by: HOSPITALIST

## 2019-08-27 PROCEDURE — 99232 SBSQ HOSP IP/OBS MODERATE 35: CPT | Performed by: INTERNAL MEDICINE

## 2019-08-27 PROCEDURE — 93005 ELECTROCARDIOGRAM TRACING: CPT | Performed by: INTERNAL MEDICINE

## 2019-08-27 PROCEDURE — 83735 ASSAY OF MAGNESIUM: CPT | Performed by: INTERNAL MEDICINE

## 2019-08-27 PROCEDURE — 63710000001 DRONABINOL PER 2.5 MG: Performed by: NURSE PRACTITIONER

## 2019-08-27 PROCEDURE — 97116 GAIT TRAINING THERAPY: CPT

## 2019-08-27 PROCEDURE — 83874 ASSAY OF MYOGLOBIN: CPT | Performed by: INTERNAL MEDICINE

## 2019-08-27 PROCEDURE — 84484 ASSAY OF TROPONIN QUANT: CPT | Performed by: INTERNAL MEDICINE

## 2019-08-27 PROCEDURE — 93010 ELECTROCARDIOGRAM REPORT: CPT | Performed by: INTERNAL MEDICINE

## 2019-08-27 PROCEDURE — 63710000001 INSULIN ASPART PER 5 UNITS: Performed by: NURSE PRACTITIONER

## 2019-08-27 RX ADMIN — MIDODRINE HYDROCHLORIDE 5 MG: 2.5 TABLET ORAL at 16:54

## 2019-08-27 RX ADMIN — OXYCODONE HYDROCHLORIDE 20 MG: 20 TABLET, FILM COATED, EXTENDED RELEASE ORAL at 21:16

## 2019-08-27 RX ADMIN — ALLOPURINOL 100 MG: 100 TABLET ORAL at 21:15

## 2019-08-27 RX ADMIN — MORPHINE SULFATE 2 MG: 2 INJECTION, SOLUTION INTRAMUSCULAR; INTRAVENOUS at 20:07

## 2019-08-27 RX ADMIN — FLECAINIDE ACETATE 50 MG: 50 TABLET ORAL at 09:37

## 2019-08-27 RX ADMIN — FLUOXETINE 40 MG: 20 CAPSULE ORAL at 06:25

## 2019-08-27 RX ADMIN — APIXABAN 5 MG: 5 TABLET, FILM COATED ORAL at 21:16

## 2019-08-27 RX ADMIN — ALLOPURINOL 100 MG: 100 TABLET ORAL at 09:37

## 2019-08-27 RX ADMIN — ONDANSETRON 8 MG: 4 TABLET, FILM COATED ORAL at 11:25

## 2019-08-27 RX ADMIN — INSULIN ASPART 2 UNITS: 100 INJECTION, SOLUTION INTRAVENOUS; SUBCUTANEOUS at 16:54

## 2019-08-27 RX ADMIN — DRONABINOL 2.5 MG: 2.5 CAPSULE ORAL at 09:55

## 2019-08-27 RX ADMIN — OXYCODONE HYDROCHLORIDE 20 MG: 20 TABLET, FILM COATED, EXTENDED RELEASE ORAL at 09:36

## 2019-08-27 RX ADMIN — IPRATROPIUM BROMIDE AND ALBUTEROL SULFATE 3 ML: .5; 3 SOLUTION RESPIRATORY (INHALATION) at 06:52

## 2019-08-27 RX ADMIN — MONTELUKAST SODIUM 10 MG: 10 TABLET, COATED ORAL at 21:15

## 2019-08-27 RX ADMIN — ONDANSETRON 8 MG: 4 TABLET, FILM COATED ORAL at 16:54

## 2019-08-27 RX ADMIN — SODIUM CHLORIDE, PRESERVATIVE FREE 10 ML: 5 INJECTION INTRAVENOUS at 21:19

## 2019-08-27 RX ADMIN — PROCHLORPERAZINE MALEATE 10 MG: 10 TABLET ORAL at 09:39

## 2019-08-27 RX ADMIN — APIXABAN 5 MG: 5 TABLET, FILM COATED ORAL at 09:39

## 2019-08-27 RX ADMIN — IPRATROPIUM BROMIDE AND ALBUTEROL SULFATE 3 ML: .5; 3 SOLUTION RESPIRATORY (INHALATION) at 15:40

## 2019-08-27 RX ADMIN — BUDESONIDE 0.5 MG: 0.5 INHALANT RESPIRATORY (INHALATION) at 06:52

## 2019-08-27 RX ADMIN — SODIUM CHLORIDE, PRESERVATIVE FREE 10 ML: 5 INJECTION INTRAVENOUS at 09:38

## 2019-08-27 RX ADMIN — MIDODRINE HYDROCHLORIDE 5 MG: 2.5 TABLET ORAL at 11:25

## 2019-08-27 RX ADMIN — MIDODRINE HYDROCHLORIDE 5 MG: 2.5 TABLET ORAL at 09:36

## 2019-08-27 RX ADMIN — CHOLECALCIFEROL TAB 10 MCG (400 UNIT) 1000 UNITS: 10 TAB at 09:37

## 2019-08-27 RX ADMIN — FLECAINIDE ACETATE 50 MG: 50 TABLET ORAL at 21:16

## 2019-08-27 RX ADMIN — DRONABINOL 2.5 MG: 2.5 CAPSULE ORAL at 21:26

## 2019-08-27 RX ADMIN — TAMSULOSIN HYDROCHLORIDE 0.4 MG: 0.4 CAPSULE ORAL at 09:37

## 2019-08-27 RX ADMIN — SENNOSIDES, DOCUSATE SODIUM 2 TABLET: 50; 8.6 TABLET, FILM COATED ORAL at 21:15

## 2019-08-27 RX ADMIN — CETIRIZINE HYDROCHLORIDE 5 MG: 10 TABLET, FILM COATED ORAL at 09:37

## 2019-08-27 RX ADMIN — PROCHLORPERAZINE MALEATE 10 MG: 10 TABLET ORAL at 21:16

## 2019-08-27 RX ADMIN — ONDANSETRON 8 MG: 4 TABLET, FILM COATED ORAL at 06:25

## 2019-08-27 RX ADMIN — BUDESONIDE 0.5 MG: 0.5 INHALANT RESPIRATORY (INHALATION) at 19:24

## 2019-08-27 RX ADMIN — PANTOPRAZOLE SODIUM 40 MG: 40 TABLET, DELAYED RELEASE ORAL at 06:25

## 2019-08-28 ENCOUNTER — APPOINTMENT (OUTPATIENT)
Dept: GENERAL RADIOLOGY | Facility: HOSPITAL | Age: 69
End: 2019-08-28

## 2019-08-28 LAB
ANION GAP SERPL CALCULATED.3IONS-SCNC: 8.4 MMOL/L (ref 5–15)
BASOPHILS # BLD AUTO: 0.05 10*3/MM3 (ref 0–0.2)
BASOPHILS NFR BLD AUTO: 0.7 % (ref 0–1.5)
BUN BLD-MCNC: 12 MG/DL (ref 8–23)
BUN/CREAT SERPL: 12 (ref 7–25)
CALCIUM SPEC-SCNC: 9.2 MG/DL (ref 8.6–10.5)
CHLORIDE SERPL-SCNC: 104 MMOL/L (ref 98–107)
CO2 SERPL-SCNC: 26.6 MMOL/L (ref 22–29)
CREAT BLD-MCNC: 1 MG/DL (ref 0.76–1.27)
CRP SERPL-MCNC: 0.47 MG/DL (ref 0–0.5)
DEPRECATED RDW RBC AUTO: 60.7 FL (ref 37–54)
EOSINOPHIL # BLD AUTO: 0.23 10*3/MM3 (ref 0–0.4)
EOSINOPHIL NFR BLD AUTO: 3.2 % (ref 0.3–6.2)
ERYTHROCYTE [DISTWIDTH] IN BLOOD BY AUTOMATED COUNT: 18.8 % (ref 12.3–15.4)
GFR SERPL CREATININE-BSD FRML MDRD: 74 ML/MIN/1.73
GLUCOSE BLD-MCNC: 112 MG/DL (ref 65–99)
GLUCOSE BLDC GLUCOMTR-MCNC: 118 MG/DL (ref 70–130)
GLUCOSE BLDC GLUCOMTR-MCNC: 133 MG/DL (ref 70–130)
GLUCOSE BLDC GLUCOMTR-MCNC: 145 MG/DL (ref 70–130)
GLUCOSE BLDC GLUCOMTR-MCNC: 161 MG/DL (ref 70–130)
HCT VFR BLD AUTO: 27.9 % (ref 37.5–51)
HGB BLD-MCNC: 8.2 G/DL (ref 13–17.7)
IMM GRANULOCYTES # BLD AUTO: 0.08 10*3/MM3 (ref 0–0.05)
IMM GRANULOCYTES NFR BLD AUTO: 1.1 % (ref 0–0.5)
LYMPHOCYTES # BLD AUTO: 0.93 10*3/MM3 (ref 0.7–3.1)
LYMPHOCYTES NFR BLD AUTO: 13 % (ref 19.6–45.3)
MCH RBC QN AUTO: 27 PG (ref 26.6–33)
MCHC RBC AUTO-ENTMCNC: 29.4 G/DL (ref 31.5–35.7)
MCV RBC AUTO: 91.8 FL (ref 79–97)
MONOCYTES # BLD AUTO: 0.78 10*3/MM3 (ref 0.1–0.9)
MONOCYTES NFR BLD AUTO: 10.9 % (ref 5–12)
NEUTROPHILS # BLD AUTO: 5.11 10*3/MM3 (ref 1.7–7)
NEUTROPHILS NFR BLD AUTO: 71.1 % (ref 42.7–76)
PLATELET # BLD AUTO: 319 10*3/MM3 (ref 140–450)
PMV BLD AUTO: 9.6 FL (ref 6–12)
POTASSIUM BLD-SCNC: 4.2 MMOL/L (ref 3.5–5.2)
RBC # BLD AUTO: 3.04 10*6/MM3 (ref 4.14–5.8)
SODIUM BLD-SCNC: 139 MMOL/L (ref 136–145)
WBC NRBC COR # BLD: 7.18 10*3/MM3 (ref 3.4–10.8)

## 2019-08-28 PROCEDURE — 99232 SBSQ HOSP IP/OBS MODERATE 35: CPT | Performed by: INTERNAL MEDICINE

## 2019-08-28 PROCEDURE — 86140 C-REACTIVE PROTEIN: CPT | Performed by: NURSE PRACTITIONER

## 2019-08-28 PROCEDURE — 94799 UNLISTED PULMONARY SVC/PX: CPT

## 2019-08-28 PROCEDURE — 25010000002 VANCOMYCIN 5 G RECONSTITUTED SOLUTION 5,000 MG VIAL: Performed by: INTERNAL MEDICINE

## 2019-08-28 PROCEDURE — 71045 X-RAY EXAM CHEST 1 VIEW: CPT

## 2019-08-28 PROCEDURE — 99233 SBSQ HOSP IP/OBS HIGH 50: CPT | Performed by: INTERNAL MEDICINE

## 2019-08-28 PROCEDURE — 80048 BASIC METABOLIC PNL TOTAL CA: CPT | Performed by: INTERNAL MEDICINE

## 2019-08-28 PROCEDURE — 25010000002 MORPHINE PER 10 MG: Performed by: INTERNAL MEDICINE

## 2019-08-28 PROCEDURE — 82962 GLUCOSE BLOOD TEST: CPT

## 2019-08-28 PROCEDURE — 25010000002 CEFEPIME 2 G/NS 100 ML SOLUTION: Performed by: INTERNAL MEDICINE

## 2019-08-28 PROCEDURE — 85025 COMPLETE CBC W/AUTO DIFF WBC: CPT | Performed by: INTERNAL MEDICINE

## 2019-08-28 PROCEDURE — 71045 X-RAY EXAM CHEST 1 VIEW: CPT | Performed by: RADIOLOGY

## 2019-08-28 PROCEDURE — 97530 THERAPEUTIC ACTIVITIES: CPT

## 2019-08-28 PROCEDURE — 63710000001 INSULIN ASPART PER 5 UNITS: Performed by: NURSE PRACTITIONER

## 2019-08-28 PROCEDURE — 63710000001 DRONABINOL PER 2.5 MG: Performed by: NURSE PRACTITIONER

## 2019-08-28 RX ORDER — OXYCODONE HYDROCHLORIDE 5 MG/1
10 TABLET ORAL EVERY 6 HOURS SCHEDULED
Status: DISCONTINUED | OUTPATIENT
Start: 2019-08-28 | End: 2019-09-03

## 2019-08-28 RX ORDER — MORPHINE SULFATE 2 MG/ML
2 INJECTION, SOLUTION INTRAMUSCULAR; INTRAVENOUS
Status: DISCONTINUED | OUTPATIENT
Start: 2019-08-28 | End: 2019-09-05 | Stop reason: HOSPADM

## 2019-08-28 RX ADMIN — ONDANSETRON 8 MG: 4 TABLET, FILM COATED ORAL at 06:39

## 2019-08-28 RX ADMIN — MORPHINE SULFATE 2 MG: 2 INJECTION, SOLUTION INTRAMUSCULAR; INTRAVENOUS at 09:27

## 2019-08-28 RX ADMIN — CEFEPIME 2 G: 2 INJECTION, POWDER, FOR SOLUTION INTRAVENOUS at 13:45

## 2019-08-28 RX ADMIN — OXYCODONE HYDROCHLORIDE 10 MG: 5 TABLET ORAL at 18:23

## 2019-08-28 RX ADMIN — ONDANSETRON 8 MG: 4 TABLET, FILM COATED ORAL at 16:30

## 2019-08-28 RX ADMIN — MIDODRINE HYDROCHLORIDE 5 MG: 2.5 TABLET ORAL at 11:38

## 2019-08-28 RX ADMIN — INSULIN ASPART 2 UNITS: 100 INJECTION, SOLUTION INTRAVENOUS; SUBCUTANEOUS at 11:38

## 2019-08-28 RX ADMIN — TAMSULOSIN HYDROCHLORIDE 0.4 MG: 0.4 CAPSULE ORAL at 09:32

## 2019-08-28 RX ADMIN — OXYCODONE HYDROCHLORIDE 20 MG: 20 TABLET, FILM COATED, EXTENDED RELEASE ORAL at 21:35

## 2019-08-28 RX ADMIN — ALLOPURINOL 100 MG: 100 TABLET ORAL at 09:32

## 2019-08-28 RX ADMIN — IPRATROPIUM BROMIDE AND ALBUTEROL SULFATE 3 ML: .5; 3 SOLUTION RESPIRATORY (INHALATION) at 07:16

## 2019-08-28 RX ADMIN — DRONABINOL 2.5 MG: 2.5 CAPSULE ORAL at 22:00

## 2019-08-28 RX ADMIN — FLUOXETINE 40 MG: 20 CAPSULE ORAL at 06:38

## 2019-08-28 RX ADMIN — APIXABAN 5 MG: 5 TABLET, FILM COATED ORAL at 09:33

## 2019-08-28 RX ADMIN — CETIRIZINE HYDROCHLORIDE 5 MG: 10 TABLET, FILM COATED ORAL at 09:32

## 2019-08-28 RX ADMIN — BUDESONIDE 0.5 MG: 0.5 INHALANT RESPIRATORY (INHALATION) at 18:46

## 2019-08-28 RX ADMIN — MONTELUKAST SODIUM 10 MG: 10 TABLET, COATED ORAL at 22:00

## 2019-08-28 RX ADMIN — MORPHINE SULFATE 2 MG: 2 INJECTION, SOLUTION INTRAMUSCULAR; INTRAVENOUS at 11:37

## 2019-08-28 RX ADMIN — VANCOMYCIN HYDROCHLORIDE 1500 MG: 5 INJECTION, POWDER, LYOPHILIZED, FOR SOLUTION INTRAVENOUS at 14:34

## 2019-08-28 RX ADMIN — SODIUM CHLORIDE, PRESERVATIVE FREE 10 ML: 5 INJECTION INTRAVENOUS at 09:31

## 2019-08-28 RX ADMIN — OXYCODONE HYDROCHLORIDE 20 MG: 20 TABLET, FILM COATED, EXTENDED RELEASE ORAL at 09:28

## 2019-08-28 RX ADMIN — DRONABINOL 2.5 MG: 2.5 CAPSULE ORAL at 09:31

## 2019-08-28 RX ADMIN — MORPHINE SULFATE 2 MG: 2 INJECTION, SOLUTION INTRAMUSCULAR; INTRAVENOUS at 22:38

## 2019-08-28 RX ADMIN — CEFEPIME 2 G: 2 INJECTION, POWDER, FOR SOLUTION INTRAVENOUS at 22:01

## 2019-08-28 RX ADMIN — POLYETHYLENE GLYCOL (3350) 17 G: 17 POWDER, FOR SOLUTION ORAL at 09:33

## 2019-08-28 RX ADMIN — SENNOSIDES, DOCUSATE SODIUM 2 TABLET: 50; 8.6 TABLET, FILM COATED ORAL at 22:01

## 2019-08-28 RX ADMIN — PANTOPRAZOLE SODIUM 40 MG: 40 TABLET, DELAYED RELEASE ORAL at 06:39

## 2019-08-28 RX ADMIN — IPRATROPIUM BROMIDE AND ALBUTEROL SULFATE 3 ML: .5; 3 SOLUTION RESPIRATORY (INHALATION) at 18:46

## 2019-08-28 RX ADMIN — IPRATROPIUM BROMIDE AND ALBUTEROL SULFATE 3 ML: .5; 3 SOLUTION RESPIRATORY (INHALATION) at 13:59

## 2019-08-28 RX ADMIN — ALLOPURINOL 100 MG: 100 TABLET ORAL at 22:01

## 2019-08-28 RX ADMIN — MORPHINE SULFATE 2 MG: 2 INJECTION, SOLUTION INTRAMUSCULAR; INTRAVENOUS at 06:55

## 2019-08-28 RX ADMIN — MIDODRINE HYDROCHLORIDE 5 MG: 2.5 TABLET ORAL at 06:36

## 2019-08-28 RX ADMIN — BUDESONIDE 0.5 MG: 0.5 INHALANT RESPIRATORY (INHALATION) at 07:16

## 2019-08-28 RX ADMIN — CHOLECALCIFEROL TAB 10 MCG (400 UNIT) 1000 UNITS: 10 TAB at 09:32

## 2019-08-28 RX ADMIN — APIXABAN 5 MG: 5 TABLET, FILM COATED ORAL at 22:01

## 2019-08-28 RX ADMIN — ONDANSETRON 8 MG: 4 TABLET, FILM COATED ORAL at 11:38

## 2019-08-28 RX ADMIN — FLECAINIDE ACETATE 50 MG: 50 TABLET ORAL at 22:01

## 2019-08-28 RX ADMIN — FLECAINIDE ACETATE 50 MG: 50 TABLET ORAL at 09:32

## 2019-08-28 RX ADMIN — MIDODRINE HYDROCHLORIDE 5 MG: 2.5 TABLET ORAL at 16:30

## 2019-08-28 RX ADMIN — MORPHINE SULFATE 2 MG: 2 INJECTION, SOLUTION INTRAMUSCULAR; INTRAVENOUS at 16:36

## 2019-08-29 LAB
ANION GAP SERPL CALCULATED.3IONS-SCNC: 7.5 MMOL/L (ref 5–15)
BASOPHILS # BLD AUTO: 0.04 10*3/MM3 (ref 0–0.2)
BASOPHILS NFR BLD AUTO: 0.5 % (ref 0–1.5)
BUN BLD-MCNC: 13 MG/DL (ref 8–23)
BUN/CREAT SERPL: 13 (ref 7–25)
CALCIUM SPEC-SCNC: 8.8 MG/DL (ref 8.6–10.5)
CHLORIDE SERPL-SCNC: 101 MMOL/L (ref 98–107)
CO2 SERPL-SCNC: 25.5 MMOL/L (ref 22–29)
CREAT BLD-MCNC: 1 MG/DL (ref 0.76–1.27)
CRP SERPL-MCNC: 1.88 MG/DL (ref 0–0.5)
DEPRECATED RDW RBC AUTO: 60.1 FL (ref 37–54)
EOSINOPHIL # BLD AUTO: 0.19 10*3/MM3 (ref 0–0.4)
EOSINOPHIL NFR BLD AUTO: 2.6 % (ref 0.3–6.2)
ERYTHROCYTE [DISTWIDTH] IN BLOOD BY AUTOMATED COUNT: 18.7 % (ref 12.3–15.4)
GFR SERPL CREATININE-BSD FRML MDRD: 74 ML/MIN/1.73
GLUCOSE BLD-MCNC: 128 MG/DL (ref 65–99)
GLUCOSE BLDC GLUCOMTR-MCNC: 120 MG/DL (ref 70–130)
GLUCOSE BLDC GLUCOMTR-MCNC: 138 MG/DL (ref 70–130)
GLUCOSE BLDC GLUCOMTR-MCNC: 159 MG/DL (ref 70–130)
GLUCOSE BLDC GLUCOMTR-MCNC: 189 MG/DL (ref 70–130)
HCT VFR BLD AUTO: 25.8 % (ref 37.5–51)
HGB BLD-MCNC: 7.6 G/DL (ref 13–17.7)
IMM GRANULOCYTES # BLD AUTO: 0.08 10*3/MM3 (ref 0–0.05)
IMM GRANULOCYTES NFR BLD AUTO: 1.1 % (ref 0–0.5)
LYMPHOCYTES # BLD AUTO: 0.84 10*3/MM3 (ref 0.7–3.1)
LYMPHOCYTES NFR BLD AUTO: 11.4 % (ref 19.6–45.3)
MCH RBC QN AUTO: 26.9 PG (ref 26.6–33)
MCHC RBC AUTO-ENTMCNC: 29.5 G/DL (ref 31.5–35.7)
MCV RBC AUTO: 91.2 FL (ref 79–97)
MONOCYTES # BLD AUTO: 1 10*3/MM3 (ref 0.1–0.9)
MONOCYTES NFR BLD AUTO: 13.5 % (ref 5–12)
NEUTROPHILS # BLD AUTO: 5.25 10*3/MM3 (ref 1.7–7)
NEUTROPHILS NFR BLD AUTO: 70.9 % (ref 42.7–76)
PLATELET # BLD AUTO: 308 10*3/MM3 (ref 140–450)
PMV BLD AUTO: 10.4 FL (ref 6–12)
POTASSIUM BLD-SCNC: 4.1 MMOL/L (ref 3.5–5.2)
RBC # BLD AUTO: 2.83 10*6/MM3 (ref 4.14–5.8)
SODIUM BLD-SCNC: 134 MMOL/L (ref 136–145)
WBC NRBC COR # BLD: 7.4 10*3/MM3 (ref 3.4–10.8)

## 2019-08-29 PROCEDURE — 94799 UNLISTED PULMONARY SVC/PX: CPT

## 2019-08-29 PROCEDURE — 25010000002 VANCOMYCIN 5 G RECONSTITUTED SOLUTION 5,000 MG VIAL: Performed by: INTERNAL MEDICINE

## 2019-08-29 PROCEDURE — 86140 C-REACTIVE PROTEIN: CPT | Performed by: NURSE PRACTITIONER

## 2019-08-29 PROCEDURE — 63710000001 PROCHLORPERAZINE MALEATE PER 10 MG: Performed by: HOSPITALIST

## 2019-08-29 PROCEDURE — 80048 BASIC METABOLIC PNL TOTAL CA: CPT | Performed by: INTERNAL MEDICINE

## 2019-08-29 PROCEDURE — P9041 ALBUMIN (HUMAN),5%, 50ML: HCPCS | Performed by: INTERNAL MEDICINE

## 2019-08-29 PROCEDURE — 25010000002 CEFEPIME 2 G/NS 100 ML SOLUTION: Performed by: INTERNAL MEDICINE

## 2019-08-29 PROCEDURE — 25010000002 ALBUMIN HUMAN 5% PER 50 ML: Performed by: INTERNAL MEDICINE

## 2019-08-29 PROCEDURE — 87040 BLOOD CULTURE FOR BACTERIA: CPT | Performed by: INTERNAL MEDICINE

## 2019-08-29 PROCEDURE — 25010000002 MORPHINE PER 10 MG: Performed by: INTERNAL MEDICINE

## 2019-08-29 PROCEDURE — 82962 GLUCOSE BLOOD TEST: CPT

## 2019-08-29 PROCEDURE — 63710000001 DRONABINOL PER 2.5 MG: Performed by: NURSE PRACTITIONER

## 2019-08-29 PROCEDURE — 25010000002 FUROSEMIDE PER 20 MG: Performed by: INTERNAL MEDICINE

## 2019-08-29 PROCEDURE — 63710000001 INSULIN ASPART PER 5 UNITS: Performed by: NURSE PRACTITIONER

## 2019-08-29 PROCEDURE — 85025 COMPLETE CBC W/AUTO DIFF WBC: CPT | Performed by: INTERNAL MEDICINE

## 2019-08-29 PROCEDURE — 99233 SBSQ HOSP IP/OBS HIGH 50: CPT | Performed by: INTERNAL MEDICINE

## 2019-08-29 RX ORDER — IPRATROPIUM BROMIDE AND ALBUTEROL SULFATE 2.5; .5 MG/3ML; MG/3ML
3 SOLUTION RESPIRATORY (INHALATION)
Status: DISCONTINUED | OUTPATIENT
Start: 2019-08-29 | End: 2019-09-05 | Stop reason: HOSPADM

## 2019-08-29 RX ORDER — FUROSEMIDE 10 MG/ML
40 INJECTION INTRAMUSCULAR; INTRAVENOUS ONCE
Status: COMPLETED | OUTPATIENT
Start: 2019-08-29 | End: 2019-08-29

## 2019-08-29 RX ORDER — ALBUMIN, HUMAN INJ 5% 5 %
12.5 SOLUTION INTRAVENOUS ONCE
Status: COMPLETED | OUTPATIENT
Start: 2019-08-29 | End: 2019-08-29

## 2019-08-29 RX ADMIN — CETIRIZINE HYDROCHLORIDE 5 MG: 10 TABLET, FILM COATED ORAL at 08:46

## 2019-08-29 RX ADMIN — CEFEPIME 2 G: 2 INJECTION, POWDER, FOR SOLUTION INTRAVENOUS at 20:23

## 2019-08-29 RX ADMIN — ALBUMIN HUMAN 12.5 G: 0.05 INJECTION, SOLUTION INTRAVENOUS at 20:18

## 2019-08-29 RX ADMIN — IPRATROPIUM BROMIDE AND ALBUTEROL SULFATE 3 ML: .5; 3 SOLUTION RESPIRATORY (INHALATION) at 05:25

## 2019-08-29 RX ADMIN — PROCHLORPERAZINE MALEATE 10 MG: 10 TABLET ORAL at 20:29

## 2019-08-29 RX ADMIN — FLECAINIDE ACETATE 50 MG: 50 TABLET ORAL at 20:21

## 2019-08-29 RX ADMIN — ONDANSETRON 8 MG: 4 TABLET, FILM COATED ORAL at 17:43

## 2019-08-29 RX ADMIN — CEFEPIME 2 G: 2 INJECTION, POWDER, FOR SOLUTION INTRAVENOUS at 04:10

## 2019-08-29 RX ADMIN — INSULIN ASPART 2 UNITS: 100 INJECTION, SOLUTION INTRAVENOUS; SUBCUTANEOUS at 11:52

## 2019-08-29 RX ADMIN — OXYCODONE HYDROCHLORIDE 10 MG: 5 TABLET ORAL at 05:11

## 2019-08-29 RX ADMIN — SODIUM CHLORIDE, PRESERVATIVE FREE 10 ML: 5 INJECTION INTRAVENOUS at 08:48

## 2019-08-29 RX ADMIN — MORPHINE SULFATE 2 MG: 2 INJECTION, SOLUTION INTRAMUSCULAR; INTRAVENOUS at 04:10

## 2019-08-29 RX ADMIN — OXYCODONE HYDROCHLORIDE 10 MG: 5 TABLET ORAL at 11:51

## 2019-08-29 RX ADMIN — BUDESONIDE 0.5 MG: 0.5 INHALANT RESPIRATORY (INHALATION) at 19:09

## 2019-08-29 RX ADMIN — TAMSULOSIN HYDROCHLORIDE 0.4 MG: 0.4 CAPSULE ORAL at 08:46

## 2019-08-29 RX ADMIN — MIDODRINE HYDROCHLORIDE 5 MG: 2.5 TABLET ORAL at 17:43

## 2019-08-29 RX ADMIN — CHOLECALCIFEROL TAB 10 MCG (400 UNIT) 1000 UNITS: 10 TAB at 08:46

## 2019-08-29 RX ADMIN — ONDANSETRON 8 MG: 4 TABLET, FILM COATED ORAL at 11:51

## 2019-08-29 RX ADMIN — ONDANSETRON 8 MG: 4 TABLET, FILM COATED ORAL at 09:32

## 2019-08-29 RX ADMIN — APIXABAN 5 MG: 5 TABLET, FILM COATED ORAL at 20:21

## 2019-08-29 RX ADMIN — ONDANSETRON 8 MG: 4 TABLET, FILM COATED ORAL at 05:11

## 2019-08-29 RX ADMIN — VANCOMYCIN HYDROCHLORIDE 1000 MG: 5 INJECTION, POWDER, LYOPHILIZED, FOR SOLUTION INTRAVENOUS at 16:10

## 2019-08-29 RX ADMIN — PANTOPRAZOLE SODIUM 40 MG: 40 TABLET, DELAYED RELEASE ORAL at 05:12

## 2019-08-29 RX ADMIN — OXYCODONE HYDROCHLORIDE 20 MG: 20 TABLET, FILM COATED, EXTENDED RELEASE ORAL at 08:46

## 2019-08-29 RX ADMIN — POLYETHYLENE GLYCOL (3350) 17 G: 17 POWDER, FOR SOLUTION ORAL at 08:47

## 2019-08-29 RX ADMIN — OXYCODONE HYDROCHLORIDE 10 MG: 5 TABLET ORAL at 00:28

## 2019-08-29 RX ADMIN — DRONABINOL 2.5 MG: 2.5 CAPSULE ORAL at 20:20

## 2019-08-29 RX ADMIN — SENNOSIDES, DOCUSATE SODIUM 2 TABLET: 50; 8.6 TABLET, FILM COATED ORAL at 20:21

## 2019-08-29 RX ADMIN — VANCOMYCIN HYDROCHLORIDE 1000 MG: 5 INJECTION, POWDER, LYOPHILIZED, FOR SOLUTION INTRAVENOUS at 02:23

## 2019-08-29 RX ADMIN — MORPHINE SULFATE 2 MG: 2 INJECTION, SOLUTION INTRAMUSCULAR; INTRAVENOUS at 16:10

## 2019-08-29 RX ADMIN — CEFEPIME 2 G: 2 INJECTION, POWDER, FOR SOLUTION INTRAVENOUS at 11:51

## 2019-08-29 RX ADMIN — SODIUM CHLORIDE, PRESERVATIVE FREE 10 ML: 5 INJECTION INTRAVENOUS at 20:20

## 2019-08-29 RX ADMIN — MIDODRINE HYDROCHLORIDE 5 MG: 2.5 TABLET ORAL at 11:51

## 2019-08-29 RX ADMIN — APIXABAN 5 MG: 5 TABLET, FILM COATED ORAL at 08:46

## 2019-08-29 RX ADMIN — FLUOXETINE 40 MG: 20 CAPSULE ORAL at 05:11

## 2019-08-29 RX ADMIN — ALLOPURINOL 100 MG: 100 TABLET ORAL at 20:21

## 2019-08-29 RX ADMIN — MONTELUKAST SODIUM 10 MG: 10 TABLET, COATED ORAL at 20:21

## 2019-08-29 RX ADMIN — OXYCODONE HYDROCHLORIDE 20 MG: 20 TABLET, FILM COATED, EXTENDED RELEASE ORAL at 20:20

## 2019-08-29 RX ADMIN — MIDODRINE HYDROCHLORIDE 5 MG: 2.5 TABLET ORAL at 05:12

## 2019-08-29 RX ADMIN — OXYCODONE HYDROCHLORIDE 10 MG: 5 TABLET ORAL at 17:43

## 2019-08-29 RX ADMIN — FUROSEMIDE 40 MG: 10 INJECTION, SOLUTION INTRAMUSCULAR; INTRAVENOUS at 20:20

## 2019-08-29 RX ADMIN — IPRATROPIUM BROMIDE AND ALBUTEROL SULFATE 3 ML: .5; 3 SOLUTION RESPIRATORY (INHALATION) at 17:35

## 2019-08-29 RX ADMIN — BUDESONIDE 0.5 MG: 0.5 INHALANT RESPIRATORY (INHALATION) at 07:50

## 2019-08-29 RX ADMIN — DRONABINOL 2.5 MG: 2.5 CAPSULE ORAL at 08:47

## 2019-08-29 RX ADMIN — FLECAINIDE ACETATE 50 MG: 50 TABLET ORAL at 08:46

## 2019-08-29 RX ADMIN — ALLOPURINOL 100 MG: 100 TABLET ORAL at 08:47

## 2019-08-30 LAB
ANION GAP SERPL CALCULATED.3IONS-SCNC: 11.1 MMOL/L (ref 5–15)
BASOPHILS # BLD AUTO: 0.03 10*3/MM3 (ref 0–0.2)
BASOPHILS NFR BLD AUTO: 0.4 % (ref 0–1.5)
BUN BLD-MCNC: 13 MG/DL (ref 8–23)
BUN/CREAT SERPL: 11.6 (ref 7–25)
CALCIUM SPEC-SCNC: 8.9 MG/DL (ref 8.6–10.5)
CHLORIDE SERPL-SCNC: 99 MMOL/L (ref 98–107)
CO2 SERPL-SCNC: 25.9 MMOL/L (ref 22–29)
CREAT BLD-MCNC: 1.12 MG/DL (ref 0.76–1.27)
CRP SERPL-MCNC: 2.23 MG/DL (ref 0–0.5)
DEPRECATED RDW RBC AUTO: 58.2 FL (ref 37–54)
EOSINOPHIL # BLD AUTO: 0.24 10*3/MM3 (ref 0–0.4)
EOSINOPHIL NFR BLD AUTO: 3.2 % (ref 0.3–6.2)
ERYTHROCYTE [DISTWIDTH] IN BLOOD BY AUTOMATED COUNT: 18.2 % (ref 12.3–15.4)
GFR SERPL CREATININE-BSD FRML MDRD: 65 ML/MIN/1.73
GLUCOSE BLD-MCNC: 128 MG/DL (ref 65–99)
GLUCOSE BLDC GLUCOMTR-MCNC: 128 MG/DL (ref 70–130)
GLUCOSE BLDC GLUCOMTR-MCNC: 131 MG/DL (ref 70–130)
GLUCOSE BLDC GLUCOMTR-MCNC: 179 MG/DL (ref 70–130)
GLUCOSE BLDC GLUCOMTR-MCNC: 180 MG/DL (ref 70–130)
HCT VFR BLD AUTO: 26.1 % (ref 37.5–51)
HGB BLD-MCNC: 7.7 G/DL (ref 13–17.7)
IMM GRANULOCYTES # BLD AUTO: 0.07 10*3/MM3 (ref 0–0.05)
IMM GRANULOCYTES NFR BLD AUTO: 0.9 % (ref 0–0.5)
LYMPHOCYTES # BLD AUTO: 0.92 10*3/MM3 (ref 0.7–3.1)
LYMPHOCYTES NFR BLD AUTO: 12.3 % (ref 19.6–45.3)
MCH RBC QN AUTO: 26.6 PG (ref 26.6–33)
MCHC RBC AUTO-ENTMCNC: 29.5 G/DL (ref 31.5–35.7)
MCV RBC AUTO: 90.3 FL (ref 79–97)
MONOCYTES # BLD AUTO: 0.92 10*3/MM3 (ref 0.1–0.9)
MONOCYTES NFR BLD AUTO: 12.3 % (ref 5–12)
NEUTROPHILS # BLD AUTO: 5.32 10*3/MM3 (ref 1.7–7)
NEUTROPHILS NFR BLD AUTO: 70.9 % (ref 42.7–76)
PLATELET # BLD AUTO: 266 10*3/MM3 (ref 140–450)
PMV BLD AUTO: 10.3 FL (ref 6–12)
POTASSIUM BLD-SCNC: 3.9 MMOL/L (ref 3.5–5.2)
RBC # BLD AUTO: 2.89 10*6/MM3 (ref 4.14–5.8)
SODIUM BLD-SCNC: 136 MMOL/L (ref 136–145)
VANCOMYCIN TROUGH SERPL-MCNC: 21 MCG/ML (ref 5–20)
WBC NRBC COR # BLD: 7.5 10*3/MM3 (ref 3.4–10.8)

## 2019-08-30 PROCEDURE — 97530 THERAPEUTIC ACTIVITIES: CPT

## 2019-08-30 PROCEDURE — 63710000001 INSULIN ASPART PER 5 UNITS: Performed by: NURSE PRACTITIONER

## 2019-08-30 PROCEDURE — 94799 UNLISTED PULMONARY SVC/PX: CPT

## 2019-08-30 PROCEDURE — 80202 ASSAY OF VANCOMYCIN: CPT | Performed by: INTERNAL MEDICINE

## 2019-08-30 PROCEDURE — 63710000001 DRONABINOL PER 2.5 MG: Performed by: NURSE PRACTITIONER

## 2019-08-30 PROCEDURE — 97116 GAIT TRAINING THERAPY: CPT

## 2019-08-30 PROCEDURE — 86140 C-REACTIVE PROTEIN: CPT | Performed by: NURSE PRACTITIONER

## 2019-08-30 PROCEDURE — 25010000002 VANCOMYCIN 5 G RECONSTITUTED SOLUTION 5,000 MG VIAL: Performed by: INTERNAL MEDICINE

## 2019-08-30 PROCEDURE — 25010000002 FUROSEMIDE PER 20 MG: Performed by: INTERNAL MEDICINE

## 2019-08-30 PROCEDURE — 82962 GLUCOSE BLOOD TEST: CPT

## 2019-08-30 PROCEDURE — P9041 ALBUMIN (HUMAN),5%, 50ML: HCPCS | Performed by: INTERNAL MEDICINE

## 2019-08-30 PROCEDURE — 80048 BASIC METABOLIC PNL TOTAL CA: CPT | Performed by: INTERNAL MEDICINE

## 2019-08-30 PROCEDURE — 85025 COMPLETE CBC W/AUTO DIFF WBC: CPT | Performed by: INTERNAL MEDICINE

## 2019-08-30 PROCEDURE — 25010000002 CEFEPIME 2 G/NS 100 ML SOLUTION: Performed by: INTERNAL MEDICINE

## 2019-08-30 PROCEDURE — 25010000002 ALBUMIN HUMAN 5% PER 50 ML: Performed by: INTERNAL MEDICINE

## 2019-08-30 PROCEDURE — 99232 SBSQ HOSP IP/OBS MODERATE 35: CPT | Performed by: INTERNAL MEDICINE

## 2019-08-30 RX ORDER — FUROSEMIDE 10 MG/ML
40 INJECTION INTRAMUSCULAR; INTRAVENOUS ONCE
Status: COMPLETED | OUTPATIENT
Start: 2019-08-30 | End: 2019-08-30

## 2019-08-30 RX ORDER — ALBUMIN, HUMAN INJ 5% 5 %
12.5 SOLUTION INTRAVENOUS ONCE
Status: COMPLETED | OUTPATIENT
Start: 2019-08-30 | End: 2019-08-30

## 2019-08-30 RX ADMIN — PANTOPRAZOLE SODIUM 40 MG: 40 TABLET, DELAYED RELEASE ORAL at 06:06

## 2019-08-30 RX ADMIN — BUDESONIDE 0.5 MG: 0.5 INHALANT RESPIRATORY (INHALATION) at 07:06

## 2019-08-30 RX ADMIN — ALLOPURINOL 100 MG: 100 TABLET ORAL at 21:50

## 2019-08-30 RX ADMIN — APIXABAN 5 MG: 5 TABLET, FILM COATED ORAL at 21:50

## 2019-08-30 RX ADMIN — POLYETHYLENE GLYCOL (3350) 17 G: 17 POWDER, FOR SOLUTION ORAL at 08:37

## 2019-08-30 RX ADMIN — FLECAINIDE ACETATE 50 MG: 50 TABLET ORAL at 21:50

## 2019-08-30 RX ADMIN — MIDODRINE HYDROCHLORIDE 5 MG: 2.5 TABLET ORAL at 17:21

## 2019-08-30 RX ADMIN — ONDANSETRON 8 MG: 4 TABLET, FILM COATED ORAL at 17:21

## 2019-08-30 RX ADMIN — FLECAINIDE ACETATE 50 MG: 50 TABLET ORAL at 11:30

## 2019-08-30 RX ADMIN — VANCOMYCIN HYDROCHLORIDE 1000 MG: 5 INJECTION, POWDER, LYOPHILIZED, FOR SOLUTION INTRAVENOUS at 01:20

## 2019-08-30 RX ADMIN — ALBUMIN HUMAN 12.5 G: 0.05 INJECTION, SOLUTION INTRAVENOUS at 14:22

## 2019-08-30 RX ADMIN — FLUOXETINE 40 MG: 20 CAPSULE ORAL at 06:06

## 2019-08-30 RX ADMIN — INSULIN ASPART 2 UNITS: 100 INJECTION, SOLUTION INTRAVENOUS; SUBCUTANEOUS at 17:21

## 2019-08-30 RX ADMIN — VANCOMYCIN HYDROCHLORIDE 1000 MG: 5 INJECTION, POWDER, LYOPHILIZED, FOR SOLUTION INTRAVENOUS at 17:21

## 2019-08-30 RX ADMIN — OXYCODONE HYDROCHLORIDE 10 MG: 5 TABLET ORAL at 00:59

## 2019-08-30 RX ADMIN — MIDODRINE HYDROCHLORIDE 5 MG: 2.5 TABLET ORAL at 06:05

## 2019-08-30 RX ADMIN — SODIUM CHLORIDE, PRESERVATIVE FREE 10 ML: 5 INJECTION INTRAVENOUS at 08:37

## 2019-08-30 RX ADMIN — IPRATROPIUM BROMIDE AND ALBUTEROL SULFATE 3 ML: .5; 3 SOLUTION RESPIRATORY (INHALATION) at 19:55

## 2019-08-30 RX ADMIN — OXYCODONE HYDROCHLORIDE 10 MG: 5 TABLET ORAL at 06:05

## 2019-08-30 RX ADMIN — OXYCODONE HYDROCHLORIDE 20 MG: 20 TABLET, FILM COATED, EXTENDED RELEASE ORAL at 21:50

## 2019-08-30 RX ADMIN — ONDANSETRON 8 MG: 4 TABLET, FILM COATED ORAL at 06:06

## 2019-08-30 RX ADMIN — IPRATROPIUM BROMIDE AND ALBUTEROL SULFATE 3 ML: .5; 3 SOLUTION RESPIRATORY (INHALATION) at 01:14

## 2019-08-30 RX ADMIN — OXYCODONE HYDROCHLORIDE 10 MG: 5 TABLET ORAL at 11:30

## 2019-08-30 RX ADMIN — CETIRIZINE HYDROCHLORIDE 5 MG: 10 TABLET, FILM COATED ORAL at 08:37

## 2019-08-30 RX ADMIN — OXYCODONE HYDROCHLORIDE 10 MG: 5 TABLET ORAL at 17:21

## 2019-08-30 RX ADMIN — DRONABINOL 2.5 MG: 2.5 CAPSULE ORAL at 21:50

## 2019-08-30 RX ADMIN — FUROSEMIDE 40 MG: 10 INJECTION, SOLUTION INTRAMUSCULAR; INTRAVENOUS at 14:22

## 2019-08-30 RX ADMIN — IPRATROPIUM BROMIDE AND ALBUTEROL SULFATE 3 ML: .5; 3 SOLUTION RESPIRATORY (INHALATION) at 07:00

## 2019-08-30 RX ADMIN — CEFEPIME 2 G: 2 INJECTION, POWDER, FOR SOLUTION INTRAVENOUS at 03:10

## 2019-08-30 RX ADMIN — TAMSULOSIN HYDROCHLORIDE 0.4 MG: 0.4 CAPSULE ORAL at 08:36

## 2019-08-30 RX ADMIN — CEFEPIME 2 G: 2 INJECTION, POWDER, FOR SOLUTION INTRAVENOUS at 11:29

## 2019-08-30 RX ADMIN — DRONABINOL 2.5 MG: 2.5 CAPSULE ORAL at 08:36

## 2019-08-30 RX ADMIN — BUDESONIDE 0.5 MG: 0.5 INHALANT RESPIRATORY (INHALATION) at 19:55

## 2019-08-30 RX ADMIN — SENNOSIDES, DOCUSATE SODIUM 2 TABLET: 50; 8.6 TABLET, FILM COATED ORAL at 21:50

## 2019-08-30 RX ADMIN — CHOLECALCIFEROL TAB 10 MCG (400 UNIT) 1000 UNITS: 10 TAB at 08:37

## 2019-08-30 RX ADMIN — OXYCODONE HYDROCHLORIDE 20 MG: 20 TABLET, FILM COATED, EXTENDED RELEASE ORAL at 08:36

## 2019-08-30 RX ADMIN — MIDODRINE HYDROCHLORIDE 5 MG: 2.5 TABLET ORAL at 11:30

## 2019-08-30 RX ADMIN — ONDANSETRON 8 MG: 4 TABLET, FILM COATED ORAL at 11:30

## 2019-08-30 RX ADMIN — ALLOPURINOL 100 MG: 100 TABLET ORAL at 08:37

## 2019-08-30 RX ADMIN — MONTELUKAST SODIUM 10 MG: 10 TABLET, COATED ORAL at 21:50

## 2019-08-30 RX ADMIN — INSULIN ASPART 2 UNITS: 100 INJECTION, SOLUTION INTRAVENOUS; SUBCUTANEOUS at 21:55

## 2019-08-30 RX ADMIN — SODIUM CHLORIDE, PRESERVATIVE FREE 10 ML: 5 INJECTION INTRAVENOUS at 21:50

## 2019-08-30 RX ADMIN — CEFEPIME 2 G: 2 INJECTION, POWDER, FOR SOLUTION INTRAVENOUS at 21:49

## 2019-08-30 RX ADMIN — APIXABAN 5 MG: 5 TABLET, FILM COATED ORAL at 08:37

## 2019-08-31 ENCOUNTER — APPOINTMENT (OUTPATIENT)
Dept: GENERAL RADIOLOGY | Facility: HOSPITAL | Age: 69
End: 2019-08-31

## 2019-08-31 LAB
ANION GAP SERPL CALCULATED.3IONS-SCNC: 11.3 MMOL/L (ref 5–15)
BASOPHILS # BLD AUTO: 0.04 10*3/MM3 (ref 0–0.2)
BASOPHILS NFR BLD AUTO: 0.6 % (ref 0–1.5)
BUN BLD-MCNC: 15 MG/DL (ref 8–23)
BUN/CREAT SERPL: 13.6 (ref 7–25)
CALCIUM SPEC-SCNC: 9.4 MG/DL (ref 8.6–10.5)
CHLORIDE SERPL-SCNC: 99 MMOL/L (ref 98–107)
CO2 SERPL-SCNC: 26.7 MMOL/L (ref 22–29)
CREAT BLD-MCNC: 1.1 MG/DL (ref 0.76–1.27)
CRP SERPL-MCNC: 1.31 MG/DL (ref 0–0.5)
DEPRECATED RDW RBC AUTO: 59.4 FL (ref 37–54)
EOSINOPHIL # BLD AUTO: 0.41 10*3/MM3 (ref 0–0.4)
EOSINOPHIL NFR BLD AUTO: 5.8 % (ref 0.3–6.2)
ERYTHROCYTE [DISTWIDTH] IN BLOOD BY AUTOMATED COUNT: 18.5 % (ref 12.3–15.4)
GFR SERPL CREATININE-BSD FRML MDRD: 66 ML/MIN/1.73
GLUCOSE BLD-MCNC: 101 MG/DL (ref 65–99)
GLUCOSE BLDC GLUCOMTR-MCNC: 105 MG/DL (ref 70–130)
GLUCOSE BLDC GLUCOMTR-MCNC: 131 MG/DL (ref 70–130)
GLUCOSE BLDC GLUCOMTR-MCNC: 138 MG/DL (ref 70–130)
GLUCOSE BLDC GLUCOMTR-MCNC: 161 MG/DL (ref 70–130)
GLUCOSE BLDC GLUCOMTR-MCNC: 183 MG/DL (ref 70–130)
HCT VFR BLD AUTO: 27.5 % (ref 37.5–51)
HGB BLD-MCNC: 8.5 G/DL (ref 13–17.7)
IMM GRANULOCYTES # BLD AUTO: 0.09 10*3/MM3 (ref 0–0.05)
IMM GRANULOCYTES NFR BLD AUTO: 1.3 % (ref 0–0.5)
LYMPHOCYTES # BLD AUTO: 1.32 10*3/MM3 (ref 0.7–3.1)
LYMPHOCYTES NFR BLD AUTO: 18.8 % (ref 19.6–45.3)
MCH RBC QN AUTO: 27.2 PG (ref 26.6–33)
MCHC RBC AUTO-ENTMCNC: 30.9 G/DL (ref 31.5–35.7)
MCV RBC AUTO: 87.9 FL (ref 79–97)
MONOCYTES # BLD AUTO: 1.11 10*3/MM3 (ref 0.1–0.9)
MONOCYTES NFR BLD AUTO: 15.8 % (ref 5–12)
NEUTROPHILS # BLD AUTO: 4.06 10*3/MM3 (ref 1.7–7)
NEUTROPHILS NFR BLD AUTO: 57.7 % (ref 42.7–76)
PLATELET # BLD AUTO: 277 10*3/MM3 (ref 140–450)
PMV BLD AUTO: 10.5 FL (ref 6–12)
POTASSIUM BLD-SCNC: 4 MMOL/L (ref 3.5–5.2)
RBC # BLD AUTO: 3.13 10*6/MM3 (ref 4.14–5.8)
SODIUM BLD-SCNC: 137 MMOL/L (ref 136–145)
WBC NRBC COR # BLD: 7.03 10*3/MM3 (ref 3.4–10.8)

## 2019-08-31 PROCEDURE — P9041 ALBUMIN (HUMAN),5%, 50ML: HCPCS | Performed by: INTERNAL MEDICINE

## 2019-08-31 PROCEDURE — 99232 SBSQ HOSP IP/OBS MODERATE 35: CPT | Performed by: INTERNAL MEDICINE

## 2019-08-31 PROCEDURE — 71045 X-RAY EXAM CHEST 1 VIEW: CPT

## 2019-08-31 PROCEDURE — 80048 BASIC METABOLIC PNL TOTAL CA: CPT | Performed by: INTERNAL MEDICINE

## 2019-08-31 PROCEDURE — 94799 UNLISTED PULMONARY SVC/PX: CPT

## 2019-08-31 PROCEDURE — 25010000002 CEFEPIME 2 G/NS 100 ML SOLUTION: Performed by: INTERNAL MEDICINE

## 2019-08-31 PROCEDURE — 63710000001 INSULIN ASPART PER 5 UNITS: Performed by: NURSE PRACTITIONER

## 2019-08-31 PROCEDURE — 63710000001 DRONABINOL PER 2.5 MG: Performed by: NURSE PRACTITIONER

## 2019-08-31 PROCEDURE — 82962 GLUCOSE BLOOD TEST: CPT

## 2019-08-31 PROCEDURE — 25010000002 VANCOMYCIN 5 G RECONSTITUTED SOLUTION 5,000 MG VIAL: Performed by: INTERNAL MEDICINE

## 2019-08-31 PROCEDURE — 25010000002 ALBUMIN HUMAN 5% PER 50 ML: Performed by: INTERNAL MEDICINE

## 2019-08-31 PROCEDURE — 85025 COMPLETE CBC W/AUTO DIFF WBC: CPT | Performed by: INTERNAL MEDICINE

## 2019-08-31 PROCEDURE — 86140 C-REACTIVE PROTEIN: CPT | Performed by: NURSE PRACTITIONER

## 2019-08-31 PROCEDURE — 25010000002 FUROSEMIDE PER 20 MG: Performed by: INTERNAL MEDICINE

## 2019-08-31 PROCEDURE — 63710000001 PROCHLORPERAZINE MALEATE PER 10 MG: Performed by: HOSPITALIST

## 2019-08-31 RX ORDER — FUROSEMIDE 10 MG/ML
40 INJECTION INTRAMUSCULAR; INTRAVENOUS ONCE
Status: COMPLETED | OUTPATIENT
Start: 2019-08-31 | End: 2019-08-31

## 2019-08-31 RX ORDER — ALBUMIN, HUMAN INJ 5% 5 %
12.5 SOLUTION INTRAVENOUS ONCE
Status: COMPLETED | OUTPATIENT
Start: 2019-08-31 | End: 2019-08-31

## 2019-08-31 RX ADMIN — OXYCODONE HYDROCHLORIDE 10 MG: 5 TABLET ORAL at 05:22

## 2019-08-31 RX ADMIN — OXYCODONE HYDROCHLORIDE 20 MG: 20 TABLET, FILM COATED, EXTENDED RELEASE ORAL at 09:02

## 2019-08-31 RX ADMIN — ALLOPURINOL 100 MG: 100 TABLET ORAL at 09:02

## 2019-08-31 RX ADMIN — FLECAINIDE ACETATE 50 MG: 50 TABLET ORAL at 21:00

## 2019-08-31 RX ADMIN — IPRATROPIUM BROMIDE AND ALBUTEROL SULFATE 3 ML: .5; 3 SOLUTION RESPIRATORY (INHALATION) at 07:02

## 2019-08-31 RX ADMIN — APIXABAN 5 MG: 5 TABLET, FILM COATED ORAL at 09:02

## 2019-08-31 RX ADMIN — APIXABAN 5 MG: 5 TABLET, FILM COATED ORAL at 21:00

## 2019-08-31 RX ADMIN — PROCHLORPERAZINE MALEATE 10 MG: 10 TABLET ORAL at 18:32

## 2019-08-31 RX ADMIN — CEFEPIME 2 G: 2 INJECTION, POWDER, FOR SOLUTION INTRAVENOUS at 05:21

## 2019-08-31 RX ADMIN — CEFEPIME 2 G: 2 INJECTION, POWDER, FOR SOLUTION INTRAVENOUS at 12:27

## 2019-08-31 RX ADMIN — MIDODRINE HYDROCHLORIDE 5 MG: 2.5 TABLET ORAL at 12:27

## 2019-08-31 RX ADMIN — FUROSEMIDE 40 MG: 10 INJECTION, SOLUTION INTRAMUSCULAR; INTRAVENOUS at 17:20

## 2019-08-31 RX ADMIN — BUDESONIDE 0.5 MG: 0.5 INHALANT RESPIRATORY (INHALATION) at 18:31

## 2019-08-31 RX ADMIN — IPRATROPIUM BROMIDE AND ALBUTEROL SULFATE 3 ML: .5; 3 SOLUTION RESPIRATORY (INHALATION) at 18:31

## 2019-08-31 RX ADMIN — OXYCODONE HYDROCHLORIDE 10 MG: 5 TABLET ORAL at 17:20

## 2019-08-31 RX ADMIN — CETIRIZINE HYDROCHLORIDE 5 MG: 10 TABLET, FILM COATED ORAL at 09:02

## 2019-08-31 RX ADMIN — ALLOPURINOL 100 MG: 100 TABLET ORAL at 21:00

## 2019-08-31 RX ADMIN — MIDODRINE HYDROCHLORIDE 5 MG: 2.5 TABLET ORAL at 09:04

## 2019-08-31 RX ADMIN — CHOLECALCIFEROL TAB 10 MCG (400 UNIT) 1000 UNITS: 10 TAB at 09:02

## 2019-08-31 RX ADMIN — INSULIN ASPART 2 UNITS: 100 INJECTION, SOLUTION INTRAVENOUS; SUBCUTANEOUS at 21:00

## 2019-08-31 RX ADMIN — SODIUM CHLORIDE, PRESERVATIVE FREE 10 ML: 5 INJECTION INTRAVENOUS at 21:00

## 2019-08-31 RX ADMIN — CEFEPIME 2 G: 2 INJECTION, POWDER, FOR SOLUTION INTRAVENOUS at 21:00

## 2019-08-31 RX ADMIN — SODIUM CHLORIDE, PRESERVATIVE FREE 10 ML: 5 INJECTION INTRAVENOUS at 09:02

## 2019-08-31 RX ADMIN — ONDANSETRON 8 MG: 4 TABLET, FILM COATED ORAL at 05:22

## 2019-08-31 RX ADMIN — FLUOXETINE 40 MG: 20 CAPSULE ORAL at 05:22

## 2019-08-31 RX ADMIN — POLYETHYLENE GLYCOL (3350) 17 G: 17 POWDER, FOR SOLUTION ORAL at 09:04

## 2019-08-31 RX ADMIN — ONDANSETRON 8 MG: 4 TABLET, FILM COATED ORAL at 17:20

## 2019-08-31 RX ADMIN — MONTELUKAST SODIUM 10 MG: 10 TABLET, COATED ORAL at 21:00

## 2019-08-31 RX ADMIN — MIDODRINE HYDROCHLORIDE 5 MG: 2.5 TABLET ORAL at 17:20

## 2019-08-31 RX ADMIN — OXYCODONE HYDROCHLORIDE 20 MG: 20 TABLET, FILM COATED, EXTENDED RELEASE ORAL at 20:59

## 2019-08-31 RX ADMIN — DRONABINOL 2.5 MG: 2.5 CAPSULE ORAL at 09:02

## 2019-08-31 RX ADMIN — ALBUMIN HUMAN 12.5 G: 0.05 INJECTION, SOLUTION INTRAVENOUS at 17:20

## 2019-08-31 RX ADMIN — OXYCODONE HYDROCHLORIDE 10 MG: 5 TABLET ORAL at 00:50

## 2019-08-31 RX ADMIN — VANCOMYCIN HYDROCHLORIDE 1000 MG: 5 INJECTION, POWDER, LYOPHILIZED, FOR SOLUTION INTRAVENOUS at 12:27

## 2019-08-31 RX ADMIN — ONDANSETRON 8 MG: 4 TABLET, FILM COATED ORAL at 12:27

## 2019-08-31 RX ADMIN — DRONABINOL 2.5 MG: 2.5 CAPSULE ORAL at 20:59

## 2019-08-31 RX ADMIN — IPRATROPIUM BROMIDE AND ALBUTEROL SULFATE 3 ML: .5; 3 SOLUTION RESPIRATORY (INHALATION) at 13:15

## 2019-08-31 RX ADMIN — SENNOSIDES, DOCUSATE SODIUM 2 TABLET: 50; 8.6 TABLET, FILM COATED ORAL at 21:00

## 2019-08-31 RX ADMIN — BUDESONIDE 0.5 MG: 0.5 INHALANT RESPIRATORY (INHALATION) at 07:02

## 2019-08-31 RX ADMIN — FLECAINIDE ACETATE 50 MG: 50 TABLET ORAL at 09:02

## 2019-08-31 RX ADMIN — TAMSULOSIN HYDROCHLORIDE 0.4 MG: 0.4 CAPSULE ORAL at 09:02

## 2019-08-31 RX ADMIN — PANTOPRAZOLE SODIUM 40 MG: 40 TABLET, DELAYED RELEASE ORAL at 05:23

## 2019-09-01 ENCOUNTER — APPOINTMENT (OUTPATIENT)
Dept: CT IMAGING | Facility: HOSPITAL | Age: 69
End: 2019-09-01

## 2019-09-01 LAB
ANION GAP SERPL CALCULATED.3IONS-SCNC: 12.6 MMOL/L (ref 5–15)
BASOPHILS # BLD AUTO: 0.05 10*3/MM3 (ref 0–0.2)
BASOPHILS NFR BLD AUTO: 0.7 % (ref 0–1.5)
BUN BLD-MCNC: 17 MG/DL (ref 8–23)
BUN/CREAT SERPL: 13.5 (ref 7–25)
CALCIUM SPEC-SCNC: 9.4 MG/DL (ref 8.6–10.5)
CHLORIDE SERPL-SCNC: 100 MMOL/L (ref 98–107)
CO2 SERPL-SCNC: 26.4 MMOL/L (ref 22–29)
CREAT BLD-MCNC: 1.26 MG/DL (ref 0.76–1.27)
CRP SERPL-MCNC: 0.88 MG/DL (ref 0–0.5)
DEPRECATED RDW RBC AUTO: 57.9 FL (ref 37–54)
EOSINOPHIL # BLD AUTO: 0.33 10*3/MM3 (ref 0–0.4)
EOSINOPHIL NFR BLD AUTO: 4.5 % (ref 0.3–6.2)
ERYTHROCYTE [DISTWIDTH] IN BLOOD BY AUTOMATED COUNT: 18 % (ref 12.3–15.4)
GFR SERPL CREATININE-BSD FRML MDRD: 56 ML/MIN/1.73
GLUCOSE BLD-MCNC: 122 MG/DL (ref 65–99)
GLUCOSE BLDC GLUCOMTR-MCNC: 120 MG/DL (ref 70–130)
GLUCOSE BLDC GLUCOMTR-MCNC: 131 MG/DL (ref 70–130)
GLUCOSE BLDC GLUCOMTR-MCNC: 139 MG/DL (ref 70–130)
GLUCOSE BLDC GLUCOMTR-MCNC: 144 MG/DL (ref 70–130)
HCT VFR BLD AUTO: 27.7 % (ref 37.5–51)
HGB BLD-MCNC: 8.2 G/DL (ref 13–17.7)
IMM GRANULOCYTES # BLD AUTO: 0.06 10*3/MM3 (ref 0–0.05)
IMM GRANULOCYTES NFR BLD AUTO: 0.8 % (ref 0–0.5)
LYMPHOCYTES # BLD AUTO: 1.38 10*3/MM3 (ref 0.7–3.1)
LYMPHOCYTES NFR BLD AUTO: 18.7 % (ref 19.6–45.3)
MCH RBC QN AUTO: 26.9 PG (ref 26.6–33)
MCHC RBC AUTO-ENTMCNC: 29.6 G/DL (ref 31.5–35.7)
MCV RBC AUTO: 90.8 FL (ref 79–97)
MONOCYTES # BLD AUTO: 0.95 10*3/MM3 (ref 0.1–0.9)
MONOCYTES NFR BLD AUTO: 12.9 % (ref 5–12)
NEUTROPHILS # BLD AUTO: 4.6 10*3/MM3 (ref 1.7–7)
NEUTROPHILS NFR BLD AUTO: 62.4 % (ref 42.7–76)
PLATELET # BLD AUTO: 261 10*3/MM3 (ref 140–450)
PMV BLD AUTO: 10.7 FL (ref 6–12)
POTASSIUM BLD-SCNC: 3.8 MMOL/L (ref 3.5–5.2)
RBC # BLD AUTO: 3.05 10*6/MM3 (ref 4.14–5.8)
SODIUM BLD-SCNC: 139 MMOL/L (ref 136–145)
VANCOMYCIN TROUGH SERPL-MCNC: 20.9 MCG/ML (ref 5–20)
WBC NRBC COR # BLD: 7.37 10*3/MM3 (ref 3.4–10.8)

## 2019-09-01 PROCEDURE — 99232 SBSQ HOSP IP/OBS MODERATE 35: CPT | Performed by: INTERNAL MEDICINE

## 2019-09-01 PROCEDURE — 71250 CT THORAX DX C-: CPT

## 2019-09-01 PROCEDURE — 94799 UNLISTED PULMONARY SVC/PX: CPT

## 2019-09-01 PROCEDURE — 80048 BASIC METABOLIC PNL TOTAL CA: CPT | Performed by: INTERNAL MEDICINE

## 2019-09-01 PROCEDURE — 25010000002 CEFEPIME 2 G/NS 100 ML SOLUTION: Performed by: INTERNAL MEDICINE

## 2019-09-01 PROCEDURE — 86140 C-REACTIVE PROTEIN: CPT | Performed by: NURSE PRACTITIONER

## 2019-09-01 PROCEDURE — 25010000002 VANCOMYCIN 5 G RECONSTITUTED SOLUTION 5,000 MG VIAL: Performed by: INTERNAL MEDICINE

## 2019-09-01 PROCEDURE — 85025 COMPLETE CBC W/AUTO DIFF WBC: CPT | Performed by: INTERNAL MEDICINE

## 2019-09-01 PROCEDURE — 80202 ASSAY OF VANCOMYCIN: CPT | Performed by: INTERNAL MEDICINE

## 2019-09-01 PROCEDURE — 82962 GLUCOSE BLOOD TEST: CPT

## 2019-09-01 PROCEDURE — 63710000001 DRONABINOL PER 2.5 MG: Performed by: NURSE PRACTITIONER

## 2019-09-01 RX ORDER — DRONABINOL 2.5 MG/1
5 CAPSULE ORAL 2 TIMES DAILY
Status: DISCONTINUED | OUTPATIENT
Start: 2019-09-02 | End: 2019-09-03

## 2019-09-01 RX ADMIN — IPRATROPIUM BROMIDE AND ALBUTEROL SULFATE 3 ML: .5; 3 SOLUTION RESPIRATORY (INHALATION) at 00:24

## 2019-09-01 RX ADMIN — BUDESONIDE 0.5 MG: 0.5 INHALANT RESPIRATORY (INHALATION) at 19:31

## 2019-09-01 RX ADMIN — MIDODRINE HYDROCHLORIDE 5 MG: 2.5 TABLET ORAL at 17:22

## 2019-09-01 RX ADMIN — SODIUM CHLORIDE, PRESERVATIVE FREE 10 ML: 5 INJECTION INTRAVENOUS at 08:30

## 2019-09-01 RX ADMIN — PANTOPRAZOLE SODIUM 40 MG: 40 TABLET, DELAYED RELEASE ORAL at 05:59

## 2019-09-01 RX ADMIN — BUDESONIDE 0.5 MG: 0.5 INHALANT RESPIRATORY (INHALATION) at 06:50

## 2019-09-01 RX ADMIN — ONDANSETRON 8 MG: 4 TABLET, FILM COATED ORAL at 05:59

## 2019-09-01 RX ADMIN — ALLOPURINOL 100 MG: 100 TABLET ORAL at 08:31

## 2019-09-01 RX ADMIN — MIDODRINE HYDROCHLORIDE 5 MG: 2.5 TABLET ORAL at 12:01

## 2019-09-01 RX ADMIN — APIXABAN 5 MG: 5 TABLET, FILM COATED ORAL at 21:29

## 2019-09-01 RX ADMIN — SENNOSIDES, DOCUSATE SODIUM 2 TABLET: 50; 8.6 TABLET, FILM COATED ORAL at 21:28

## 2019-09-01 RX ADMIN — ONDANSETRON 8 MG: 4 TABLET, FILM COATED ORAL at 12:02

## 2019-09-01 RX ADMIN — CHOLECALCIFEROL TAB 10 MCG (400 UNIT) 1000 UNITS: 10 TAB at 08:30

## 2019-09-01 RX ADMIN — ONDANSETRON 8 MG: 4 TABLET, FILM COATED ORAL at 17:22

## 2019-09-01 RX ADMIN — IPRATROPIUM BROMIDE AND ALBUTEROL SULFATE 3 ML: .5; 3 SOLUTION RESPIRATORY (INHALATION) at 13:04

## 2019-09-01 RX ADMIN — FLUOXETINE 40 MG: 20 CAPSULE ORAL at 05:59

## 2019-09-01 RX ADMIN — OXYCODONE HYDROCHLORIDE 10 MG: 5 TABLET ORAL at 05:58

## 2019-09-01 RX ADMIN — CEFEPIME 2 G: 2 INJECTION, POWDER, FOR SOLUTION INTRAVENOUS at 12:03

## 2019-09-01 RX ADMIN — MONTELUKAST SODIUM 10 MG: 10 TABLET, COATED ORAL at 21:28

## 2019-09-01 RX ADMIN — CETIRIZINE HYDROCHLORIDE 5 MG: 10 TABLET, FILM COATED ORAL at 08:31

## 2019-09-01 RX ADMIN — OXYCODONE HYDROCHLORIDE 10 MG: 5 TABLET ORAL at 00:55

## 2019-09-01 RX ADMIN — OXYCODONE HYDROCHLORIDE 20 MG: 20 TABLET, FILM COATED, EXTENDED RELEASE ORAL at 21:28

## 2019-09-01 RX ADMIN — SODIUM CHLORIDE, PRESERVATIVE FREE 10 ML: 5 INJECTION INTRAVENOUS at 21:29

## 2019-09-01 RX ADMIN — FLECAINIDE ACETATE 50 MG: 50 TABLET ORAL at 08:33

## 2019-09-01 RX ADMIN — IPRATROPIUM BROMIDE AND ALBUTEROL SULFATE 3 ML: .5; 3 SOLUTION RESPIRATORY (INHALATION) at 06:50

## 2019-09-01 RX ADMIN — ACETAMINOPHEN 650 MG: 325 TABLET ORAL at 16:11

## 2019-09-01 RX ADMIN — OXYCODONE HYDROCHLORIDE 10 MG: 5 TABLET ORAL at 17:22

## 2019-09-01 RX ADMIN — MIDODRINE HYDROCHLORIDE 5 MG: 2.5 TABLET ORAL at 08:31

## 2019-09-01 RX ADMIN — VANCOMYCIN HYDROCHLORIDE 1000 MG: 5 INJECTION, POWDER, LYOPHILIZED, FOR SOLUTION INTRAVENOUS at 05:59

## 2019-09-01 RX ADMIN — DRONABINOL 2.5 MG: 2.5 CAPSULE ORAL at 08:30

## 2019-09-01 RX ADMIN — IPRATROPIUM BROMIDE AND ALBUTEROL SULFATE 3 ML: .5; 3 SOLUTION RESPIRATORY (INHALATION) at 19:31

## 2019-09-01 RX ADMIN — CEFEPIME 2 G: 2 INJECTION, POWDER, FOR SOLUTION INTRAVENOUS at 21:29

## 2019-09-01 RX ADMIN — APIXABAN 5 MG: 5 TABLET, FILM COATED ORAL at 08:31

## 2019-09-01 RX ADMIN — CEFEPIME 2 G: 2 INJECTION, POWDER, FOR SOLUTION INTRAVENOUS at 04:50

## 2019-09-01 RX ADMIN — ALLOPURINOL 100 MG: 100 TABLET ORAL at 21:28

## 2019-09-01 RX ADMIN — FLECAINIDE ACETATE 50 MG: 50 TABLET ORAL at 21:28

## 2019-09-01 RX ADMIN — TAMSULOSIN HYDROCHLORIDE 0.4 MG: 0.4 CAPSULE ORAL at 08:31

## 2019-09-01 RX ADMIN — DRONABINOL 2.5 MG: 2.5 CAPSULE ORAL at 21:28

## 2019-09-01 RX ADMIN — POLYETHYLENE GLYCOL (3350) 17 G: 17 POWDER, FOR SOLUTION ORAL at 08:30

## 2019-09-01 RX ADMIN — OXYCODONE HYDROCHLORIDE 10 MG: 5 TABLET ORAL at 12:09

## 2019-09-01 NOTE — NURSING NOTE
After sitting patient up on the side of the bed per his request to use urinal, orthostatic bps obtained in lying & sitting position. Patient had repeat episode of decreased oxygen saturations & syncope. Orthostatic Hypotension noted, see flowsheets. Dr. Reddy called & notified of vitals/repeat syncopic episode, etc. Stated she would review patient's chart.

## 2019-09-01 NOTE — NURSING NOTE
"Patient's son came out to nurses's station, notified lead RN that patient was up at bedside to use urinal and suddenly started shaking, had his eyes \"roll back in his head\", & collapsed on the bed. Family had patient back in bed with help of floor staff upon my entrance to room after rapid response had been called. Upon pulling patient up in bed he is arousing, answering questions appropriately, Vitals taken & WNL at this time. NORBERT Sherwood at bedside notified of episode, assessing patient. Informed family and patient to always call out from now on for the help of staff when patient is getting up.   "

## 2019-09-01 NOTE — PLAN OF CARE
Problem: Pain, Chronic (Adult)  Goal: Identify Related Risk Factors and Signs and Symptoms  Outcome: Ongoing (interventions implemented as appropriate)   08/25/19 1625   Pain, Chronic (Adult)   Related Risk Factors (Chronic Pain) disease process   Signs and Symptoms (Chronic Pain) verbalization of pain/discomfort for a prolonged time period     Goal: Acceptable Pain/Comfort Level and Functional Ability  Outcome: Ongoing (interventions implemented as appropriate)      Problem: Patient Care Overview  Goal: Plan of Care Review  Outcome: Ongoing (interventions implemented as appropriate)   08/30/19 0008 08/31/19 2100   Coping/Psychosocial   Plan of Care Reviewed With --  patient   Plan of Care Review   Progress improving --      Goal: Individualization and Mutuality  Outcome: Ongoing (interventions implemented as appropriate)    Goal: Discharge Needs Assessment  Outcome: Ongoing (interventions implemented as appropriate)    Goal: Interprofessional Rounds/Family Conf  Outcome: Ongoing (interventions implemented as appropriate)      Problem: Fall Risk (Adult)  Goal: Identify Related Risk Factors and Signs and Symptoms  Outcome: Ongoing (interventions implemented as appropriate)   08/25/19 1625   Fall Risk (Adult)   Related Risk Factors (Fall Risk) environment unfamiliar;fatigue/slow reaction;gait/mobility problems   Signs and Symptoms (Fall Risk) presence of risk factors     Goal: Absence of Fall  Outcome: Ongoing (interventions implemented as appropriate)      Problem: Skin Injury Risk (Adult)  Goal: Identify Related Risk Factors and Signs and Symptoms  Outcome: Ongoing (interventions implemented as appropriate)   08/25/19 1625   Skin Injury Risk (Adult)   Related Risk Factors (Skin Injury Risk) mobility impaired     Goal: Skin Health and Integrity  Outcome: Ongoing (interventions implemented as appropriate)      Problem: Mobility, Physical Impaired (Adult)  Goal: Identify Related Risk Factors and Signs and  Symptoms  Outcome: Ongoing (interventions implemented as appropriate)   08/25/19 1687   Mobility, Physical Impaired (Adult)   Related Risk Factors (Physical Mobility, Impaired) activity intolerance;disease process;pain/discomfort   Signs and Symptoms (Physical Mobility Impaired) unsafe transfers/ambulation     Goal: Enhanced Mobility Skills  Outcome: Ongoing (interventions implemented as appropriate)    Goal: Enhanced Functional Ability  Outcome: Ongoing (interventions implemented as appropriate)

## 2019-09-01 NOTE — PLAN OF CARE
Problem: Pain, Chronic (Adult)  Goal: Acceptable Pain/Comfort Level and Functional Ability  Outcome: Ongoing (interventions implemented as appropriate)      Problem: Fall Risk (Adult)  Goal: Absence of Fall  Outcome: Ongoing (interventions implemented as appropriate)      Problem: Skin Injury Risk (Adult)  Goal: Skin Health and Integrity  Outcome: Ongoing (interventions implemented as appropriate)      Problem: Mobility, Physical Impaired (Adult)  Goal: Enhanced Mobility Skills  Outcome: Ongoing (interventions implemented as appropriate)

## 2019-09-01 NOTE — PROGRESS NOTES
[facility] HOSPITALIST PROGRESS NOTE     Patient Identification:  Name:  Todd Phillips  Age:  69 y.o.  Sex:  male  :  1950  MRN:  [id number]  Visit Number:  [id number]  Primary Care Provider:  Adiel Denney MD    Length of stay:  26    Chief complaint:  69 y.o. old male admitted with Chest Pain and Fever          Subjective:    Patient seen with nursing staff.  Family present at bedside.  Patient states that he is not feeling much better today.  Patient continues to have poor appetite and continues to have nausea.  Family states that patient sat up earlier today but became dizzy and had an episode where he had presyncope with eyes rolling up and some twitching which lasted for few seconds.  Patient did not have loss of bowel or bladder control.       Current Hospital Meds:    allopurinol 100 mg Oral BID   apixaban 5 mg Oral Q12H   budesonide 0.5 mg Nebulization BID - RT   cefepime 2 g Intravenous Q8H   cetirizine 5 mg Oral Daily   cholecalciferol 1,000 Units Oral Daily   dronabinol 2.5 mg Oral BID   flecainide 50 mg Oral Q12H   FLUoxetine 40 mg Oral QAM   insulin aspart 0-7 Units Subcutaneous 4x Daily AC & at Bedtime   ipratropium-albuterol 3 mL Nebulization Q6H - RT   midodrine 5 mg Oral TID AC   montelukast 10 mg Oral Nightly   ondansetron 8 mg Oral TID AC   oxyCODONE 10 mg Oral Q6H   oxyCODONE 20 mg Oral Q12H   pantoprazole 40 mg Oral QAM   polyethylene glycol 17 g Oral Daily   senna-docusate 2 tablet Oral Nightly   sodium chloride 10 mL Intravenous Q12H   tamsulosin 0.4 mg Oral Daily   vancomycin 1,000 mg Intravenous Q18H        ----------------------------------------------------------------------------------------------------------------------  Vital Signs:  Temp:  [97.5 °F (36.4 °C)-98.1 °F (36.7 °C)] 98.1 °F (36.7 °C)  Heart Rate:  [] 87  Resp:  [18-20] 18  BP: ()/(44-74) 113/74      19  1842 19  0518 19  0503   Weight: 73.8 kg (162 lb 11.2 oz)  71.6 kg (157 lb 14.4 oz) 73.5 kg (162 lb 1.6 oz)     Body mass index is 21.98 kg/m².    Intake/Output Summary (Last 24 hours) at 9/1/2019 1229  Last data filed at 9/1/2019 1210  Gross per 24 hour   Intake 320 ml   Output 1450 ml   Net -1130 ml     Diet Regular; Consistent Carbohydrate  ----------------------------------------------------------------------------------------------------------------------  Physical exam:  Constitutional: Elderly male, appears ill, lying comfortably in bed..     HENT:  Head:  Normocephalic and atraumatic.  Mouth:  Moist mucous membranes.    Eyes:  Conjunctivae and EOM are normal.  Pupils are equal, round, and reactive to light.   Neck:  Neck supple.  No JVD present.    Cardiovascular:  Regular rate and rhythm. S1+S2. No murmur, rubs or gallops.   Pulmonary/Chest: Bilateral fine inspiratory crackles up to mid zone, which seems to be unchanged since yesterday.  Abdominal:  Soft. Non-tender. No viscera palpable.  Bowel sounds audible.   Musculoskeletal: No deformity or joint swelling.   Peripheral vascular: Bilateral dorsalis pedis palpable. No edema.   Neurological:  Alert and oriented to person, place, and time.  Cranial nerves grossly intact.  Generalized weakness but no focal neurological deficit.  Skin:  Skin is warm and dry. No rash noted. No pallor.     ----------------------------------------------------------------------------------------------------------------------  Tele:    ----------------------------------------------------------------------------------------------------------------------  Results from last 7 days   Lab Units 08/27/19  1014   CK TOTAL U/L 23   CKMB ng/mL 1.11   TROPONIN T ng/mL <0.010   MYOGLOBIN ng/mL 23.1*     Results from last 7 days   Lab Units 09/01/19  0441 08/31/19  0558 08/30/19  0522   CRP mg/dL 0.88* 1.31* 2.23*   WBC 10*3/mm3 7.37 7.03 7.50   HEMOGLOBIN g/dL 8.2* 8.5* 7.7*   HEMATOCRIT % 27.7* 27.5* 26.1*   MCV fL 90.8 87.9 90.3   MCHC g/dL 29.6*  30.9* 29.5*   PLATELETS 10*3/mm3 261 140 378         Results from last 7 days   Lab Units 09/01/19  0441 08/31/19  0558 08/30/19  0522  08/27/19  0456 08/26/19  0602   SODIUM mmol/L 139 137 136   < >  --  140   POTASSIUM mmol/L 3.8 4.0 3.9   < >  --  3.9   MAGNESIUM mg/dL  --   --   --   --  2.7* 1.8   CHLORIDE mmol/L 100 99 99   < >  --  101   CO2 mmol/L 26.4 26.7 25.9   < >  --  27.2   BUN mg/dL 17 15 13   < >  --  12   CREATININE mg/dL 1.26 1.10 1.12   < >  --  1.06   EGFR IF NONAFRICN AM mL/min/1.73 56* 66 65   < >  --  69   CALCIUM mg/dL 9.4 9.4 8.9   < >  --  8.8   GLUCOSE mg/dL 122* 101* 128*   < >  --  122*   ALBUMIN g/dL  --   --   --   --   --  2.75*    < > = values in this interval not displayed.   Estimated Creatinine Clearance: 55.9 mL/min (by C-G formula based on SCr of 1.26 mg/dL).    No results found for: AMMONIA                    I have personally looked at the labs and they are summarized above.  ----------------------------------------------------------------------------------------------------------------------  Imaging Results (last 24 hours)     ** No results found for the last 24 hours. **        ----------------------------------------------------------------------------------------------------------------------  Assessment and Plan:      -Pancreatic cancer  Status post pancreatoduodenectomy on chemotherapy with gemcitabine/Abraxane.  Patient started having elevation of CA 19-9 and was started on chemotherapy with gemcitabine and Abraxane.  Patient initially tolerated well but then developed pancytopenia/febrile neutropenia and dose was reduced.  Last chemotherapy was about 4 weeks ago.  He is found to have creasing levels of CA-19-9.  Hematology oncology following.    -Hypotension  Blood pressure is improved.  Co sytroponin test was normal.  Continue midodrine.    -Paroxysmal atrial fibrillation  Patient continues to have controlled rate and maintaining sinus rhythm.  Continue flecainide  and continue Eliquis for stroke prophylaxis.      -Healthcare associated pneumonia  Patient had completed course of antibiotics.  Patient had negative blood cultures.  CT scan showed persistent infiltrates and after discussion with patient's family, oncology and Dr. Oneil, antibiotics restarted and will continue.  W BC continues to remain within normal limits and has been within normal limits.  CRP is improving and is down to 0.88 from 1.3 yesterday.   Repeat blood cultures pending.  Repeat chest x-ray showed no change from a previous chest x-ray.    Repeat CT scan of the chest showed not much change in bilateral infiltrates.  Discussed with the family.  Will request Dr. Oneil to review the CT scan.    -Acute hypoxemic respiratory failure  Continues to have hypoxemia requiring supplemental oxygen via nasal cannula @ 2 L/min which needs to be increased to 4 L/min with the minimal activity.  Chest x-ray showed diffuse ill-defined reticular nodular pulmonary infiltrates greatest in mid and lower lungs, unchanged.  Concerned that patient may have lymphangitic spread causing infiltrates and hypoxemia.    CT scan of the chest on 8/27/2019 showed persistent bilateral infiltrates again concerning for lymphangitic spread, other differentials include volume overload and persistent pneumonia.    -Spontaneous pneumomediastinum  Unclear etiology, may be due to retching or persistent cough.  Continue supportive treatment with rest and avoiding the maneuvers that increase her pulmonary pressure.  Appreciate help of Dr. Oneil.  CT shows some improvement pneumomediastinum.    -Acute decompensation of chronic diastolic CHF  Pt had diuresed well again yesterday with albumin and lasix. Fluid balance in last 24 hours is -800 ml, net fluid balance is -4500 mL.  Creatinine has gone up to 1.26 and I would hold any further diuresis at this time.    -AMS  Pt had a brief episode of twitching. Will get CT head given his pancreatic cancer  and concern for any mets.     -Intractable nausea and vomiting  Unchanged since yesterday.  Continue Zofran.    -Anorexia  Patient continues to have poor appetite.  Continue Marinol.     - Prophylaxis  DVT: Eliquis  GI: PPI.     The patient is high risk due to: Hypoxemia, pancreatic cancer, CHF.   Patient was wanting to go to [facility] for a second opinion.  I had called [facility] and discussed with hospitalist on call who discussed with pulmonologist.  Pulmonologist reviewed patient's imaging and stated that he cannot add anything to the management at this time.  I informed the family and offered to call [facility] or any other facility.  Patient wanted to continue antibiotics and stay here at this time.    I discussed the patients findings and my recommendations with patient, family and nursing staff.    Kelly Dumont MD  09/01/19  12:29 PM

## 2019-09-02 ENCOUNTER — APPOINTMENT (OUTPATIENT)
Dept: CT IMAGING | Facility: HOSPITAL | Age: 69
End: 2019-09-02

## 2019-09-02 LAB
ANION GAP SERPL CALCULATED.3IONS-SCNC: 10.5 MMOL/L (ref 5–15)
BASOPHILS # BLD AUTO: 0.04 10*3/MM3 (ref 0–0.2)
BASOPHILS NFR BLD AUTO: 0.6 % (ref 0–1.5)
BUN BLD-MCNC: 17 MG/DL (ref 8–23)
BUN/CREAT SERPL: 15.7 (ref 7–25)
CALCIUM SPEC-SCNC: 9.3 MG/DL (ref 8.6–10.5)
CHLORIDE SERPL-SCNC: 102 MMOL/L (ref 98–107)
CO2 SERPL-SCNC: 25.5 MMOL/L (ref 22–29)
CREAT BLD-MCNC: 1.08 MG/DL (ref 0.76–1.27)
CRP SERPL-MCNC: 0.76 MG/DL (ref 0–0.5)
DEPRECATED RDW RBC AUTO: 57.1 FL (ref 37–54)
EOSINOPHIL # BLD AUTO: 0.41 10*3/MM3 (ref 0–0.4)
EOSINOPHIL NFR BLD AUTO: 6.4 % (ref 0.3–6.2)
ERYTHROCYTE [DISTWIDTH] IN BLOOD BY AUTOMATED COUNT: 18 % (ref 12.3–15.4)
GFR SERPL CREATININE-BSD FRML MDRD: 67 ML/MIN/1.73
GLUCOSE BLD-MCNC: 123 MG/DL (ref 65–99)
GLUCOSE BLDC GLUCOMTR-MCNC: 113 MG/DL (ref 70–130)
GLUCOSE BLDC GLUCOMTR-MCNC: 126 MG/DL (ref 70–130)
GLUCOSE BLDC GLUCOMTR-MCNC: 128 MG/DL (ref 70–130)
GLUCOSE BLDC GLUCOMTR-MCNC: 153 MG/DL (ref 70–130)
HCT VFR BLD AUTO: 26.8 % (ref 37.5–51)
HGB BLD-MCNC: 7.9 G/DL (ref 13–17.7)
IMM GRANULOCYTES # BLD AUTO: 0.08 10*3/MM3 (ref 0–0.05)
IMM GRANULOCYTES NFR BLD AUTO: 1.3 % (ref 0–0.5)
LYMPHOCYTES # BLD AUTO: 1.14 10*3/MM3 (ref 0.7–3.1)
LYMPHOCYTES NFR BLD AUTO: 17.8 % (ref 19.6–45.3)
MCH RBC QN AUTO: 26.8 PG (ref 26.6–33)
MCHC RBC AUTO-ENTMCNC: 29.5 G/DL (ref 31.5–35.7)
MCV RBC AUTO: 90.8 FL (ref 79–97)
MONOCYTES # BLD AUTO: 0.82 10*3/MM3 (ref 0.1–0.9)
MONOCYTES NFR BLD AUTO: 12.8 % (ref 5–12)
NEUTROPHILS # BLD AUTO: 3.9 10*3/MM3 (ref 1.7–7)
NEUTROPHILS NFR BLD AUTO: 61.1 % (ref 42.7–76)
PLATELET # BLD AUTO: 223 10*3/MM3 (ref 140–450)
PMV BLD AUTO: 9.7 FL (ref 6–12)
POTASSIUM BLD-SCNC: 3.8 MMOL/L (ref 3.5–5.2)
RBC # BLD AUTO: 2.95 10*6/MM3 (ref 4.14–5.8)
SODIUM BLD-SCNC: 138 MMOL/L (ref 136–145)
WBC NRBC COR # BLD: 6.39 10*3/MM3 (ref 3.4–10.8)

## 2019-09-02 PROCEDURE — 99233 SBSQ HOSP IP/OBS HIGH 50: CPT | Performed by: INTERNAL MEDICINE

## 2019-09-02 PROCEDURE — 25010000002 CEFEPIME 2 G/NS 100 ML SOLUTION: Performed by: INTERNAL MEDICINE

## 2019-09-02 PROCEDURE — 25010000002 VANCOMYCIN 5 G RECONSTITUTED SOLUTION 5,000 MG VIAL: Performed by: INTERNAL MEDICINE

## 2019-09-02 PROCEDURE — 86301 IMMUNOASSAY TUMOR CA 19-9: CPT | Performed by: INTERNAL MEDICINE

## 2019-09-02 PROCEDURE — 82962 GLUCOSE BLOOD TEST: CPT

## 2019-09-02 PROCEDURE — 85025 COMPLETE CBC W/AUTO DIFF WBC: CPT | Performed by: INTERNAL MEDICINE

## 2019-09-02 PROCEDURE — 80048 BASIC METABOLIC PNL TOTAL CA: CPT | Performed by: INTERNAL MEDICINE

## 2019-09-02 PROCEDURE — 70450 CT HEAD/BRAIN W/O DYE: CPT

## 2019-09-02 PROCEDURE — 94640 AIRWAY INHALATION TREATMENT: CPT

## 2019-09-02 PROCEDURE — 94799 UNLISTED PULMONARY SVC/PX: CPT

## 2019-09-02 PROCEDURE — 63710000001 DRONABINOL PER 2.5 MG: Performed by: INTERNAL MEDICINE

## 2019-09-02 PROCEDURE — 86140 C-REACTIVE PROTEIN: CPT | Performed by: NURSE PRACTITIONER

## 2019-09-02 RX ORDER — LACTULOSE 10 G/15ML
20 SOLUTION ORAL DAILY
Status: DISCONTINUED | OUTPATIENT
Start: 2019-09-02 | End: 2019-09-05 | Stop reason: HOSPADM

## 2019-09-02 RX ORDER — BISACODYL 10 MG
10 SUPPOSITORY, RECTAL RECTAL DAILY
Status: DISCONTINUED | OUTPATIENT
Start: 2019-09-02 | End: 2019-09-02

## 2019-09-02 RX ORDER — BISACODYL 10 MG
10 SUPPOSITORY, RECTAL RECTAL ONCE
Status: COMPLETED | OUTPATIENT
Start: 2019-09-02 | End: 2019-09-02

## 2019-09-02 RX ADMIN — ONDANSETRON 8 MG: 4 TABLET, FILM COATED ORAL at 05:49

## 2019-09-02 RX ADMIN — CETIRIZINE HYDROCHLORIDE 5 MG: 10 TABLET, FILM COATED ORAL at 09:04

## 2019-09-02 RX ADMIN — DRONABINOL 5 MG: 2.5 CAPSULE ORAL at 09:04

## 2019-09-02 RX ADMIN — CEFEPIME 2 G: 2 INJECTION, POWDER, FOR SOLUTION INTRAVENOUS at 05:49

## 2019-09-02 RX ADMIN — OXYCODONE HYDROCHLORIDE 10 MG: 5 TABLET ORAL at 11:33

## 2019-09-02 RX ADMIN — APIXABAN 5 MG: 5 TABLET, FILM COATED ORAL at 20:00

## 2019-09-02 RX ADMIN — OXYCODONE HYDROCHLORIDE 20 MG: 20 TABLET, FILM COATED, EXTENDED RELEASE ORAL at 20:13

## 2019-09-02 RX ADMIN — ONDANSETRON 8 MG: 4 TABLET, FILM COATED ORAL at 17:32

## 2019-09-02 RX ADMIN — FLECAINIDE ACETATE 50 MG: 50 TABLET ORAL at 20:00

## 2019-09-02 RX ADMIN — FLECAINIDE ACETATE 50 MG: 50 TABLET ORAL at 09:04

## 2019-09-02 RX ADMIN — VANCOMYCIN HYDROCHLORIDE 1000 MG: 5 INJECTION, POWDER, LYOPHILIZED, FOR SOLUTION INTRAVENOUS at 09:05

## 2019-09-02 RX ADMIN — CEFEPIME 2 G: 2 INJECTION, POWDER, FOR SOLUTION INTRAVENOUS at 20:00

## 2019-09-02 RX ADMIN — ONDANSETRON 8 MG: 4 TABLET, FILM COATED ORAL at 11:33

## 2019-09-02 RX ADMIN — BUDESONIDE 0.5 MG: 0.5 INHALANT RESPIRATORY (INHALATION) at 06:57

## 2019-09-02 RX ADMIN — Medication 10 MG: at 17:31

## 2019-09-02 RX ADMIN — OXYCODONE HYDROCHLORIDE 10 MG: 5 TABLET ORAL at 05:48

## 2019-09-02 RX ADMIN — SENNOSIDES, DOCUSATE SODIUM 2 TABLET: 50; 8.6 TABLET, FILM COATED ORAL at 20:00

## 2019-09-02 RX ADMIN — PANTOPRAZOLE SODIUM 40 MG: 40 TABLET, DELAYED RELEASE ORAL at 05:49

## 2019-09-02 RX ADMIN — TAMSULOSIN HYDROCHLORIDE 0.4 MG: 0.4 CAPSULE ORAL at 09:05

## 2019-09-02 RX ADMIN — IPRATROPIUM BROMIDE AND ALBUTEROL SULFATE 3 ML: .5; 3 SOLUTION RESPIRATORY (INHALATION) at 12:15

## 2019-09-02 RX ADMIN — APIXABAN 5 MG: 5 TABLET, FILM COATED ORAL at 09:05

## 2019-09-02 RX ADMIN — OXYCODONE HYDROCHLORIDE 10 MG: 5 TABLET ORAL at 17:32

## 2019-09-02 RX ADMIN — DRONABINOL 5 MG: 2.5 CAPSULE ORAL at 20:00

## 2019-09-02 RX ADMIN — MIDODRINE HYDROCHLORIDE 5 MG: 2.5 TABLET ORAL at 09:04

## 2019-09-02 RX ADMIN — POLYETHYLENE GLYCOL (3350) 17 G: 17 POWDER, FOR SOLUTION ORAL at 09:05

## 2019-09-02 RX ADMIN — CHOLECALCIFEROL TAB 10 MCG (400 UNIT) 1000 UNITS: 10 TAB at 09:05

## 2019-09-02 RX ADMIN — ALLOPURINOL 100 MG: 100 TABLET ORAL at 20:00

## 2019-09-02 RX ADMIN — OXYCODONE HYDROCHLORIDE 10 MG: 5 TABLET ORAL at 01:08

## 2019-09-02 RX ADMIN — IPRATROPIUM BROMIDE AND ALBUTEROL SULFATE 3 ML: .5; 3 SOLUTION RESPIRATORY (INHALATION) at 19:48

## 2019-09-02 RX ADMIN — OXYCODONE HYDROCHLORIDE 20 MG: 20 TABLET, FILM COATED, EXTENDED RELEASE ORAL at 09:04

## 2019-09-02 RX ADMIN — ACETAMINOPHEN 650 MG: 325 TABLET ORAL at 11:30

## 2019-09-02 RX ADMIN — CEFEPIME 2 G: 2 INJECTION, POWDER, FOR SOLUTION INTRAVENOUS at 11:33

## 2019-09-02 RX ADMIN — FLUOXETINE 40 MG: 20 CAPSULE ORAL at 05:49

## 2019-09-02 RX ADMIN — MIDODRINE HYDROCHLORIDE 5 MG: 2.5 TABLET ORAL at 17:32

## 2019-09-02 RX ADMIN — ALLOPURINOL 100 MG: 100 TABLET ORAL at 09:04

## 2019-09-02 RX ADMIN — MONTELUKAST SODIUM 10 MG: 10 TABLET, COATED ORAL at 20:00

## 2019-09-02 RX ADMIN — BUDESONIDE 0.5 MG: 0.5 INHALANT RESPIRATORY (INHALATION) at 19:48

## 2019-09-02 RX ADMIN — MIDODRINE HYDROCHLORIDE 5 MG: 2.5 TABLET ORAL at 11:33

## 2019-09-02 RX ADMIN — IPRATROPIUM BROMIDE AND ALBUTEROL SULFATE 3 ML: .5; 3 SOLUTION RESPIRATORY (INHALATION) at 06:57

## 2019-09-02 RX ADMIN — LACTULOSE 20 G: 10 SOLUTION ORAL at 17:31

## 2019-09-02 NOTE — PROGRESS NOTES
Pharmacokinetics Service Note:    Patient is continuing day 6 of vancomycin. A 16.5 hr post infusion trough came back today at 20.9 mg/L. Based on the level and the timing of the level, will adjusted the vancomycin dosing to 1 g every 24 hours. Will continue to monitor renal function and will obtain a trough once steady state is reached.     Thank you   Miguel Frias   Pharmacy Resident

## 2019-09-02 NOTE — PLAN OF CARE
Problem: Pain, Chronic (Adult)  Goal: Identify Related Risk Factors and Signs and Symptoms  Outcome: Ongoing (interventions implemented as appropriate)    Goal: Acceptable Pain/Comfort Level and Functional Ability  Outcome: Ongoing (interventions implemented as appropriate)      Problem: Patient Care Overview  Goal: Plan of Care Review  Outcome: Ongoing (interventions implemented as appropriate)   08/30/19 0008 09/02/19 0904   Coping/Psychosocial   Plan of Care Reviewed With --  patient;family   Plan of Care Review   Progress improving --        Problem: Fall Risk (Adult)  Goal: Identify Related Risk Factors and Signs and Symptoms  Outcome: Ongoing (interventions implemented as appropriate)    Goal: Absence of Fall  Outcome: Ongoing (interventions implemented as appropriate)      Problem: Skin Injury Risk (Adult)  Goal: Identify Related Risk Factors and Signs and Symptoms  Outcome: Ongoing (interventions implemented as appropriate)    Goal: Skin Health and Integrity  Outcome: Ongoing (interventions implemented as appropriate)      Problem: Mobility, Physical Impaired (Adult)  Goal: Identify Related Risk Factors and Signs and Symptoms  Outcome: Ongoing (interventions implemented as appropriate)    Goal: Enhanced Mobility Skills  Outcome: Ongoing (interventions implemented as appropriate)    Goal: Enhanced Functional Ability  Outcome: Ongoing (interventions implemented as appropriate)

## 2019-09-02 NOTE — PROGRESS NOTES
Assisted By: Madalyn BEE, daughter, wife    CC: Follow-up on interstitial lung disease and pancreatic cancer    Interview History/HPI: Patient's appetite continues to be marginal, intermittent nausea, he states his pain in his abdominal area is overall marginally controlled narcotic analgesia currently prescribed, intensity waxes and wanes but it is constant pain.  He states he has not had a bowel movement in 3 to 4 days.  Abdominal pain is in context to stage IV metastatic pancreatic cancer.        Vitals:    09/02/19 1225   BP:    Pulse: 86   Resp: 18   Temp:    SpO2:        Intake/Output Summary (Last 24 hours) at 9/2/2019 1424  Last data filed at 9/2/2019 0300  Gross per 24 hour   Intake 300 ml   Output 150 ml   Net 150 ml       EXAM: Patient is emaciated appearing.  He overall appears to be chronically ill although in no acute distress.  Lungs have bilateral breath sounds some crackles at the bases posterior and lateral heart regular rate and rhythm without murmur port site on the right looks good abdomen is soft some diffuse tenderness occasional bowel sounds    Tele: Sinus, reviewed    LABS:   Lab Results (last 48 hours)     Procedure Component Value Units Date/Time    POC Glucose Once [[id number]]  (Normal) Collected:  09/02/19 1136    Specimen:  Blood Updated:  09/02/19 1202     Glucose 126 mg/dL     POC Glucose Once [[id number]]  (Normal) Collected:  09/02/19 0707    Specimen:  Blood Updated:  09/02/19 0714     Glucose 113 mg/dL     C-reactive Protein [[id number]]  (Abnormal) Collected:  09/02/19 0017    Specimen:  Blood Updated:  09/02/19 0030     C-Reactive Protein 0.76 mg/dL     Basic Metabolic Panel [[id number]]  (Abnormal) Collected:  09/02/19 0017    Specimen:  Blood Updated:  09/02/19 0030     Glucose 123 mg/dL      BUN 17 mg/dL      Creatinine 1.08 mg/dL      Sodium 138 mmol/L      Potassium 3.8 mmol/L      Chloride 102 mmol/L      CO2 25.5 mmol/L      Calcium 9.3 mg/dL      eGFR Non  Amer 67  mL/min/1.73      BUN/Creatinine Ratio 15.7     Anion Gap 10.5 mmol/L     Narrative:       GFR Normal >60  Chronic Kidney Disease <60  Kidney Failure <15    CBC & Differential [[id number]] Collected:  09/02/19 0017    Specimen:  Blood Updated:  09/02/19 0021    Narrative:       The following orders were created for panel order CBC & Differential.  Procedure                               Abnormality         Status                     ---------                               -----------         ------                     CBC Auto Differential[[id number]]        Abnormal            Final result                 Please view results for these tests on the individual orders.    CBC Auto Differential [[id number]]  (Abnormal) Collected:  09/02/19 0017    Specimen:  Blood Updated:  09/02/19 0021     WBC 6.39 10*3/mm3      RBC 2.95 10*6/mm3      Hemoglobin 7.9 g/dL      Hematocrit 26.8 %      MCV 90.8 fL      MCH 26.8 pg      MCHC 29.5 g/dL      RDW 18.0 %      RDW-SD 57.1 fl      MPV 9.7 fL      Platelets 223 10*3/mm3      Neutrophil % 61.1 %      Lymphocyte % 17.8 %      Monocyte % 12.8 %      Eosinophil % 6.4 %      Basophil % 0.6 %      Immature Grans % 1.3 %      Neutrophils, Absolute 3.90 10*3/mm3      Lymphocytes, Absolute 1.14 10*3/mm3      Monocytes, Absolute 0.82 10*3/mm3      Eosinophils, Absolute 0.41 10*3/mm3      Basophils, Absolute 0.04 10*3/mm3      Immature Grans, Absolute 0.08 10*3/mm3     Vancomycin, Trough [[id number]]  (Abnormal) Collected:  09/01/19 2315    Specimen:  Blood Updated:  09/01/19 2355     Vancomycin Trough 20.90 mcg/mL     POC Glucose Once [[id number]]  (Abnormal) Collected:  09/01/19 2127    Specimen:  Blood Updated:  09/01/19 2152     Glucose 131 mg/dL     Blood Culture - Blood, Arm, Right [[id number]] Collected:  08/29/19 2022    Specimen:  Blood from Arm, Right Updated:  09/01/19 2045     Blood Culture No growth at 3 days    Blood Culture - Blood, Hand, Right [[id number]] Collected:  08/29/19 2023     Specimen:  Blood from Hand, Right Updated:  09/01/19 2045     Blood Culture No growth at 3 days    POC Glucose Once [[id number]]  (Abnormal) Collected:  09/01/19 1614    Specimen:  Blood Updated:  09/01/19 1623     Glucose 144 mg/dL     POC Glucose Once [[id number]]  (Abnormal) Collected:  09/01/19 1117    Specimen:  Blood Updated:  09/01/19 1125     Glucose 139 mg/dL     POC Glucose Once [[id number]]  (Normal) Collected:  09/01/19 0715    Specimen:  Blood Updated:  09/01/19 0722     Glucose 120 mg/dL     C-reactive Protein [[id number]]  (Abnormal) Collected:  09/01/19 0441    Specimen:  Blood Updated:  09/01/19 0651     C-Reactive Protein 0.88 mg/dL     Basic Metabolic Panel [[id number]]  (Abnormal) Collected:  09/01/19 0441    Specimen:  Blood Updated:  09/01/19 0651     Glucose 122 mg/dL      BUN 17 mg/dL      Creatinine 1.26 mg/dL      Sodium 139 mmol/L      Potassium 3.8 mmol/L      Chloride 100 mmol/L      CO2 26.4 mmol/L      Calcium 9.4 mg/dL      eGFR Non African Amer 56 mL/min/1.73      BUN/Creatinine Ratio 13.5     Anion Gap 12.6 mmol/L     Narrative:       GFR Normal >60  Chronic Kidney Disease <60  Kidney Failure <15    CBC & Differential [[id number]] Collected:  09/01/19 0441    Specimen:  Blood Updated:  09/01/19 0621    Narrative:       The following orders were created for panel order CBC & Differential.  Procedure                               Abnormality         Status                     ---------                               -----------         ------                     CBC Auto Differential[[id number]]        Abnormal            Final result                 Please view results for these tests on the individual orders.    CBC Auto Differential [[id number]]  (Abnormal) Collected:  09/01/19 0441    Specimen:  Blood Updated:  09/01/19 0621     WBC 7.37 10*3/mm3      RBC 3.05 10*6/mm3      Hemoglobin 8.2 g/dL      Hematocrit 27.7 %      MCV 90.8 fL      MCH 26.9 pg      MCHC 29.6 g/dL      RDW 18.0 %       RDW-SD 57.9 fl      MPV 10.7 fL      Platelets 261 10*3/mm3      Neutrophil % 62.4 %      Lymphocyte % 18.7 %      Monocyte % 12.9 %      Eosinophil % 4.5 %      Basophil % 0.7 %      Immature Grans % 0.8 %      Neutrophils, Absolute 4.60 10*3/mm3      Lymphocytes, Absolute 1.38 10*3/mm3      Monocytes, Absolute 0.95 10*3/mm3      Eosinophils, Absolute 0.33 10*3/mm3      Basophils, Absolute 0.05 10*3/mm3      Immature Grans, Absolute 0.06 10*3/mm3     POC Glucose Once [[id number]]  (Abnormal) Collected:  08/31/19 2122    Specimen:  Blood Updated:  08/31/19 2136     Glucose 161 mg/dL     POC Glucose Once [[id number]]  (Abnormal) Collected:  08/31/19 2047    Specimen:  Blood Updated:  08/31/19 2053     Glucose 183 mg/dL     POC Glucose Once [[id number]]  (Abnormal) Collected:  08/31/19 1619    Specimen:  Blood Updated:  08/31/19 1638     Glucose 138 mg/dL           Radiology:  Imaging Results (last 72 hours)     Procedure Component Value Units Date/Time    CT Head Without Contrast [[id number]] Collected:  09/02/19 1014     Updated:  09/02/19 1016    Narrative:       CT Head WO    HISTORY:   69-year-old male inpatient with a history of syncopal episode and fainting. Sepsis. Hypomagnesemia. Hyponatremia.    TECHNIQUE:   Axial unenhanced head CT. Radiation dose reduction techniques included automated exposure control or exposure modulation based on body size. Count of known CT and cardiac nuc med studies performed in previous 12 months: 28.     Time of scan: 0942 hours    COMPARISON:   8/13/2019    FINDINGS:   No intracranial hemorrhage, mass, or infarct. No hydrocephalus or extra-axial fluid collection. There are senescent changes, including volume loss and nonspecific white matter change, but no acute abnormality is seen. The skull base, calvarium, and  extracranial soft tissues are normal.      Impression:       Senescent changes without acute abnormality. No significant change..          Signer Name: Henri Holguin  MD   Signed: 9/2/2019 10:14 AM   Workstation Name: [email or web address]    [facility]    CT Chest Without Contrast [[id number]] Collected:  09/01/19 1554     Updated:  09/01/19 1556    Narrative:       CT Chest WO    INDICATION:   69-year-old male with shortness of air today. Pneumonia.    TECHNIQUE:   CT of the thorax without IV contrast. Coronal and sagittal reconstructions were obtained.  Radiation dose reduction techniques included automated exposure control or exposure modulation based on body size. Count of known CT and cardiac nuc med studies  performed in previous 12 months: 37.     COMPARISON:   CT chest 8/27/2019.  CT chest 12/19/2018.    FINDINGS:  Thoracic aorta normal in course and caliber. Heart size is upper limits. No pericardial effusion. Pneumomediastinum is not significantly changed from the prior examination. The esophagus is fluid-filled.    No pleural effusions. No pneumothorax. No significant interval improvement in patchy basilar predominant infiltrates throughout both lungs. Cannot exclude an underlying component of interstitial lung disease/fibrosis. Follow-up to resolution recommended.     Visualized upper abdomen is unremarkable. Suspected punctate bilateral nonobstructing intrarenal calculi. Chronic appearing compression fractures in the lower thoracic spine. No other acute osseous abnormality.      Impression:         1. Pneumomediastinum, unchanged from the prior examination 8/27/2019.  2. No interval improvement in patchy basilar predominant infiltrates throughout both lungs. Cannot exclude an underlying component of interstitial lung disease/fibrosis. Follow-up to resolution recommended.    Signer Name: Joel Suazo MD   Signed: 9/1/2019 3:54 PM   [facility]: [email or web address]    [facility]    XR Chest 1 View [[id number]] Collected:  08/31/19 1026     Updated:  08/31/19 1028    Narrative:       CR Chest 1 Vw    INDICATION:   Respiratory  failure and pneumonia. Sepsis in a 69-year-old male.     COMPARISON:    8/28/2019    FINDINGS:  Single portable AP view(s) of the chest.    Right-sided Mediport catheter unchanged.    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 Background interstitial fibrosis. More confluent opacities in the peripheral mid and lower lung zones bilaterally do not appear appreciably changed. Minimal blunting of  the CP angles bilaterally is stable. No pneumothorax.       Impression:       No significant interval change from 8/28/2019.    Signer Name: Henri Holguin MD   Signed: 8/31/2019 10:26 AM   Workstation Name: [email or web address]    [facility] [place]            Results for orders placed during the hospital encounter of 08/06/19   Adult Transthoracic Echo Complete W/ Cont if Necessary Per Protocol    Narrative · Technically limited and difficult study due to poor echo window. Only   apical and subcostal views are obtained...  · Left ventricular systolic function is normal.  · Unable to estimate LVEF and to evaluate wall motion as endocardial   border is not distinctly seen.  · Left ventricular diastolic dysfunction.  · Normal cardiac chamber dimensions.  · The aortic valve is not well visualized.  · The mitral valve is normal in structure. No mitral valve regurgitation   is present. No significant mitral valve stenosis is present.  · The tricuspid valve is normal. No evidence of tricuspid valve stenosis   is present. No tricuspid valve regurgitation is present.  · The pulmonic valve is not well visualized.  · There is no evidence of pericardial effusion.            Assessment/Plan:   Respiratory failure with ongoing interstitial lung disease.  Patient's inflammatory markers are normal, BNP is normal although noted low albumin, he has been intolerant of further diuresis due to low blood pressure and near syncope.  He has been on antibiotics but this is not seem to alter the course.   His CA 19-9 is been rising, I am repeating this to follow the trend.  I am very concerned this may be lymphangitic spread.  Patient does not want to go with hospice but may consider going to oral antibiotics and home with home health, will discuss this further with  and oncology tomorrow.  Per Dr. Dumont's note, he did review this case with hospitalist at [facility] who reviewed this with her pulmonologist and did not have anything further to add.  I explained that certainly we can transfer him to [facility] but most likely they would not have anything else to add as well, the only way to get a definitive diagnosis would be either transbronchial or open lung biopsy.  This would be of some risk and if this is lymphangitic spread this would not alter the course of his therapy.  Of interest patient has not lost weight since coming in the hospital at least by the weight records    Pancreatic cancer, as above, apparently PET scan has been scheduled for Friday, trying to get the patient home so he can undergo this.  This may at least help answer some questions to the extent of his cancer.  I am very guarded whether the patient will ever be able to get to the point of further chemotherapy, his appetite is poor functional status poor.  His pain continues.    History of paroxysmal atrial fibrillation, on Eliquis for stroke prevention    Possibly consider home tomorrow after discussion with oncology and  and will use home p.o. antibiotics and outpatient follow-up

## 2019-09-02 NOTE — PLAN OF CARE
Problem: Pain, Chronic (Adult)  Goal: Identify Related Risk Factors and Signs and Symptoms  Outcome: Ongoing (interventions implemented as appropriate)   08/25/19 1625   Pain, Chronic (Adult)   Related Risk Factors (Chronic Pain) disease process   Signs and Symptoms (Chronic Pain) verbalization of pain/discomfort for a prolonged time period     Goal: Acceptable Pain/Comfort Level and Functional Ability  Outcome: Ongoing (interventions implemented as appropriate)      Problem: Patient Care Overview  Goal: Plan of Care Review  Outcome: Ongoing (interventions implemented as appropriate)   08/30/19 0008 09/01/19 2100   Coping/Psychosocial   Plan of Care Reviewed With --  patient   Plan of Care Review   Progress improving --      Goal: Individualization and Mutuality  Outcome: Ongoing (interventions implemented as appropriate)    Goal: Discharge Needs Assessment  Outcome: Ongoing (interventions implemented as appropriate)    Goal: Interprofessional Rounds/Family Conf  Outcome: Ongoing (interventions implemented as appropriate)      Problem: Fall Risk (Adult)  Goal: Identify Related Risk Factors and Signs and Symptoms  Outcome: Ongoing (interventions implemented as appropriate)   08/25/19 1625   Fall Risk (Adult)   Related Risk Factors (Fall Risk) environment unfamiliar;fatigue/slow reaction;gait/mobility problems   Signs and Symptoms (Fall Risk) presence of risk factors     Goal: Absence of Fall  Outcome: Ongoing (interventions implemented as appropriate)      Problem: Skin Injury Risk (Adult)  Goal: Identify Related Risk Factors and Signs and Symptoms  Outcome: Ongoing (interventions implemented as appropriate)   08/25/19 1625   Skin Injury Risk (Adult)   Related Risk Factors (Skin Injury Risk) mobility impaired     Goal: Skin Health and Integrity  Outcome: Ongoing (interventions implemented as appropriate)      Problem: Mobility, Physical Impaired (Adult)  Goal: Identify Related Risk Factors and Signs and  Symptoms  Outcome: Ongoing (interventions implemented as appropriate)   08/25/19 5825   Mobility, Physical Impaired (Adult)   Related Risk Factors (Physical Mobility, Impaired) activity intolerance;disease process;pain/discomfort   Signs and Symptoms (Physical Mobility Impaired) unsafe transfers/ambulation     Goal: Enhanced Mobility Skills  Outcome: Ongoing (interventions implemented as appropriate)    Goal: Enhanced Functional Ability  Outcome: Ongoing (interventions implemented as appropriate)

## 2019-09-03 LAB
ALBUMIN SERPL-MCNC: 3.13 G/DL (ref 3.5–5.2)
ALBUMIN/GLOB SERPL: 0.9 G/DL
ALP SERPL-CCNC: 119 U/L (ref 39–117)
ALT SERPL W P-5'-P-CCNC: 9 U/L (ref 1–41)
ANION GAP SERPL CALCULATED.3IONS-SCNC: 9.8 MMOL/L (ref 5–15)
AST SERPL-CCNC: 16 U/L (ref 1–40)
BACTERIA SPEC AEROBE CULT: NORMAL
BACTERIA SPEC AEROBE CULT: NORMAL
BASOPHILS # BLD AUTO: 0.06 10*3/MM3 (ref 0–0.2)
BASOPHILS NFR BLD AUTO: 0.8 % (ref 0–1.5)
BILIRUB SERPL-MCNC: 0.3 MG/DL (ref 0.2–1.2)
BUN BLD-MCNC: 16 MG/DL (ref 8–23)
BUN/CREAT SERPL: 14.3 (ref 7–25)
CALCIUM SPEC-SCNC: 9.7 MG/DL (ref 8.6–10.5)
CANCER AG19-9 SERPL-ACNC: 134.3 U/ML
CHLORIDE SERPL-SCNC: 104 MMOL/L (ref 98–107)
CO2 SERPL-SCNC: 24.2 MMOL/L (ref 22–29)
CREAT BLD-MCNC: 1.12 MG/DL (ref 0.76–1.27)
CRP SERPL-MCNC: 0.44 MG/DL (ref 0–0.5)
DEPRECATED RDW RBC AUTO: 57.1 FL (ref 37–54)
EOSINOPHIL # BLD AUTO: 0.38 10*3/MM3 (ref 0–0.4)
EOSINOPHIL NFR BLD AUTO: 5 % (ref 0.3–6.2)
ERYTHROCYTE [DISTWIDTH] IN BLOOD BY AUTOMATED COUNT: 17.9 % (ref 12.3–15.4)
GFR SERPL CREATININE-BSD FRML MDRD: 65 ML/MIN/1.73
GLOBULIN UR ELPH-MCNC: 3.7 GM/DL
GLUCOSE BLD-MCNC: 121 MG/DL (ref 65–99)
GLUCOSE BLDC GLUCOMTR-MCNC: 118 MG/DL (ref 70–130)
GLUCOSE BLDC GLUCOMTR-MCNC: 119 MG/DL (ref 70–130)
GLUCOSE BLDC GLUCOMTR-MCNC: 124 MG/DL (ref 70–130)
GLUCOSE BLDC GLUCOMTR-MCNC: 125 MG/DL (ref 70–130)
HCT VFR BLD AUTO: 27.2 % (ref 37.5–51)
HGB BLD-MCNC: 7.9 G/DL (ref 13–17.7)
IMM GRANULOCYTES # BLD AUTO: 0.06 10*3/MM3 (ref 0–0.05)
IMM GRANULOCYTES NFR BLD AUTO: 0.8 % (ref 0–0.5)
LYMPHOCYTES # BLD AUTO: 1.35 10*3/MM3 (ref 0.7–3.1)
LYMPHOCYTES NFR BLD AUTO: 17.8 % (ref 19.6–45.3)
MCH RBC QN AUTO: 26.4 PG (ref 26.6–33)
MCHC RBC AUTO-ENTMCNC: 29 G/DL (ref 31.5–35.7)
MCV RBC AUTO: 91 FL (ref 79–97)
MONOCYTES # BLD AUTO: 0.75 10*3/MM3 (ref 0.1–0.9)
MONOCYTES NFR BLD AUTO: 9.9 % (ref 5–12)
NEUTROPHILS # BLD AUTO: 4.97 10*3/MM3 (ref 1.7–7)
NEUTROPHILS NFR BLD AUTO: 65.7 % (ref 42.7–76)
PLATELET # BLD AUTO: 180 10*3/MM3 (ref 140–450)
PMV BLD AUTO: 9.8 FL (ref 6–12)
POTASSIUM BLD-SCNC: 4.2 MMOL/L (ref 3.5–5.2)
PROT SERPL-MCNC: 6.8 G/DL (ref 6–8.5)
RBC # BLD AUTO: 2.99 10*6/MM3 (ref 4.14–5.8)
SODIUM BLD-SCNC: 138 MMOL/L (ref 136–145)
WBC NRBC COR # BLD: 7.57 10*3/MM3 (ref 3.4–10.8)

## 2019-09-03 PROCEDURE — 97530 THERAPEUTIC ACTIVITIES: CPT

## 2019-09-03 PROCEDURE — 63710000001 PROCHLORPERAZINE MALEATE PER 10 MG: Performed by: INTERNAL MEDICINE

## 2019-09-03 PROCEDURE — 97110 THERAPEUTIC EXERCISES: CPT

## 2019-09-03 PROCEDURE — 86140 C-REACTIVE PROTEIN: CPT | Performed by: NURSE PRACTITIONER

## 2019-09-03 PROCEDURE — 94799 UNLISTED PULMONARY SVC/PX: CPT

## 2019-09-03 PROCEDURE — 99233 SBSQ HOSP IP/OBS HIGH 50: CPT | Performed by: INTERNAL MEDICINE

## 2019-09-03 PROCEDURE — 25010000002 VANCOMYCIN 5 G RECONSTITUTED SOLUTION 5,000 MG VIAL: Performed by: INTERNAL MEDICINE

## 2019-09-03 PROCEDURE — 63710000001 DRONABINOL PER 2.5 MG: Performed by: INTERNAL MEDICINE

## 2019-09-03 PROCEDURE — 80053 COMPREHEN METABOLIC PANEL: CPT | Performed by: INTERNAL MEDICINE

## 2019-09-03 PROCEDURE — 85025 COMPLETE CBC W/AUTO DIFF WBC: CPT | Performed by: INTERNAL MEDICINE

## 2019-09-03 PROCEDURE — 99232 SBSQ HOSP IP/OBS MODERATE 35: CPT | Performed by: INTERNAL MEDICINE

## 2019-09-03 PROCEDURE — 82962 GLUCOSE BLOOD TEST: CPT

## 2019-09-03 PROCEDURE — 25010000002 CEFEPIME 2 G/NS 100 ML SOLUTION: Performed by: INTERNAL MEDICINE

## 2019-09-03 RX ORDER — LACTULOSE 10 G/15ML
30 SOLUTION ORAL ONCE
Status: DISCONTINUED | OUTPATIENT
Start: 2019-09-03 | End: 2019-09-05 | Stop reason: HOSPADM

## 2019-09-03 RX ORDER — PROCHLORPERAZINE MALEATE 10 MG
10 TABLET ORAL EVERY 6 HOURS
Status: COMPLETED | OUTPATIENT
Start: 2019-09-03 | End: 2019-09-03

## 2019-09-03 RX ORDER — OXYCODONE HYDROCHLORIDE 5 MG/1
5 TABLET ORAL EVERY 6 HOURS SCHEDULED
Status: DISCONTINUED | OUTPATIENT
Start: 2019-09-04 | End: 2019-09-04

## 2019-09-03 RX ADMIN — ONDANSETRON 8 MG: 4 TABLET, FILM COATED ORAL at 12:24

## 2019-09-03 RX ADMIN — MIDODRINE HYDROCHLORIDE 5 MG: 2.5 TABLET ORAL at 12:24

## 2019-09-03 RX ADMIN — CHOLECALCIFEROL TAB 10 MCG (400 UNIT) 1000 UNITS: 10 TAB at 09:59

## 2019-09-03 RX ADMIN — IPRATROPIUM BROMIDE AND ALBUTEROL SULFATE 3 ML: .5; 3 SOLUTION RESPIRATORY (INHALATION) at 00:25

## 2019-09-03 RX ADMIN — SENNOSIDES, DOCUSATE SODIUM 2 TABLET: 50; 8.6 TABLET, FILM COATED ORAL at 20:11

## 2019-09-03 RX ADMIN — FLECAINIDE ACETATE 50 MG: 50 TABLET ORAL at 20:11

## 2019-09-03 RX ADMIN — FLUOXETINE 40 MG: 20 CAPSULE ORAL at 05:02

## 2019-09-03 RX ADMIN — BUDESONIDE 0.5 MG: 0.5 INHALANT RESPIRATORY (INHALATION) at 19:14

## 2019-09-03 RX ADMIN — IPRATROPIUM BROMIDE AND ALBUTEROL SULFATE 3 ML: .5; 3 SOLUTION RESPIRATORY (INHALATION) at 19:14

## 2019-09-03 RX ADMIN — FLECAINIDE ACETATE 50 MG: 50 TABLET ORAL at 09:59

## 2019-09-03 RX ADMIN — ALLOPURINOL 100 MG: 100 TABLET ORAL at 09:58

## 2019-09-03 RX ADMIN — IPRATROPIUM BROMIDE AND ALBUTEROL SULFATE 3 ML: .5; 3 SOLUTION RESPIRATORY (INHALATION) at 13:28

## 2019-09-03 RX ADMIN — SODIUM CHLORIDE, PRESERVATIVE FREE 10 ML: 5 INJECTION INTRAVENOUS at 10:01

## 2019-09-03 RX ADMIN — LACTULOSE 20 G: 10 SOLUTION ORAL at 09:54

## 2019-09-03 RX ADMIN — ONDANSETRON 8 MG: 4 TABLET, FILM COATED ORAL at 17:08

## 2019-09-03 RX ADMIN — APIXABAN 5 MG: 5 TABLET, FILM COATED ORAL at 09:58

## 2019-09-03 RX ADMIN — IPRATROPIUM BROMIDE AND ALBUTEROL SULFATE 3 ML: .5; 3 SOLUTION RESPIRATORY (INHALATION) at 06:45

## 2019-09-03 RX ADMIN — CEFEPIME 2 G: 2 INJECTION, POWDER, FOR SOLUTION INTRAVENOUS at 20:11

## 2019-09-03 RX ADMIN — PROCHLORPERAZINE MALEATE 10 MG: 10 TABLET ORAL at 17:08

## 2019-09-03 RX ADMIN — PANTOPRAZOLE SODIUM 40 MG: 40 TABLET, DELAYED RELEASE ORAL at 05:02

## 2019-09-03 RX ADMIN — MIDODRINE HYDROCHLORIDE 5 MG: 2.5 TABLET ORAL at 17:08

## 2019-09-03 RX ADMIN — CEFEPIME 2 G: 2 INJECTION, POWDER, FOR SOLUTION INTRAVENOUS at 12:24

## 2019-09-03 RX ADMIN — OXYCODONE HYDROCHLORIDE 20 MG: 20 TABLET, FILM COATED, EXTENDED RELEASE ORAL at 20:20

## 2019-09-03 RX ADMIN — POLYETHYLENE GLYCOL (3350) 17 G: 17 POWDER, FOR SOLUTION ORAL at 09:54

## 2019-09-03 RX ADMIN — OXYCODONE HYDROCHLORIDE 10 MG: 5 TABLET ORAL at 17:12

## 2019-09-03 RX ADMIN — MIDODRINE HYDROCHLORIDE 5 MG: 2.5 TABLET ORAL at 09:57

## 2019-09-03 RX ADMIN — ALLOPURINOL 100 MG: 100 TABLET ORAL at 20:11

## 2019-09-03 RX ADMIN — OXYCODONE HYDROCHLORIDE 20 MG: 20 TABLET, FILM COATED, EXTENDED RELEASE ORAL at 09:55

## 2019-09-03 RX ADMIN — TAMSULOSIN HYDROCHLORIDE 0.4 MG: 0.4 CAPSULE ORAL at 09:58

## 2019-09-03 RX ADMIN — ONDANSETRON 8 MG: 4 TABLET, FILM COATED ORAL at 05:03

## 2019-09-03 RX ADMIN — BUDESONIDE 0.5 MG: 0.5 INHALANT RESPIRATORY (INHALATION) at 06:45

## 2019-09-03 RX ADMIN — CEFEPIME 2 G: 2 INJECTION, POWDER, FOR SOLUTION INTRAVENOUS at 03:19

## 2019-09-03 RX ADMIN — OXYCODONE HYDROCHLORIDE 10 MG: 5 TABLET ORAL at 00:30

## 2019-09-03 RX ADMIN — MONTELUKAST SODIUM 10 MG: 10 TABLET, COATED ORAL at 20:11

## 2019-09-03 RX ADMIN — VANCOMYCIN HYDROCHLORIDE 1000 MG: 5 INJECTION, POWDER, LYOPHILIZED, FOR SOLUTION INTRAVENOUS at 10:04

## 2019-09-03 RX ADMIN — CETIRIZINE HYDROCHLORIDE 5 MG: 10 TABLET, FILM COATED ORAL at 09:57

## 2019-09-03 RX ADMIN — APIXABAN 5 MG: 5 TABLET, FILM COATED ORAL at 20:11

## 2019-09-03 RX ADMIN — DRONABINOL 5 MG: 2.5 CAPSULE ORAL at 09:55

## 2019-09-03 RX ADMIN — PROCHLORPERAZINE MALEATE 10 MG: 10 TABLET ORAL at 12:24

## 2019-09-03 RX ADMIN — OXYCODONE HYDROCHLORIDE 10 MG: 5 TABLET ORAL at 05:02

## 2019-09-03 NOTE — PROGRESS NOTES
Todd Phillips  [id number]  1950  9/3/2019    Referring Provider:   Reta Harris APRN     Reason for Followup:   Pancreatic Cancer        Patient Care Team:  Adiel Denney MD as PCP - General  JoselynGwen bradford MD as PCP - Claims Attributed  Loida Campbell APRN as Nurse Practitioner (Nurse Practitioner)    Chief Complaint:  Dyspnea    Subjective:   Mr. Phillips continues to have ongoing dyspnea without improvement. He notes that he does get more short of breath on exertion. He notes ongoing pain but son notes that this had been controlled. He also notes ongoing nausea. Son that he did eat better yesterday.         Allergies:    Chlorhexidine; Contrast dye; and Fentanyl    Outpatient Medications:  Medications Prior to Admission   Medication Sig Dispense Refill Last Dose   • albuterol (PROVENTIL HFA;VENTOLIN HFA) 108 (90 BASE) MCG/ACT inhaler Inhale 2 puffs Every 4 (Four) Hours As Needed for Wheezing or Shortness of Air.   Past Month at Unknown time   • albuterol (PROVENTIL) (2.5 MG/3ML) 0.083% nebulizer solution Take 2.5 mg by nebulization Every 6 (Six) Hours As Needed for Wheezing or Shortness of Air.   Past Week at Unknown time   • allopurinol (ZYLOPRIM) 100 MG tablet Take 1 tablet by mouth Daily. (Patient taking differently: Take 100 mg by mouth 2 (Two) Times a Day.) 90 tablet 0 8/6/2019 at 0800   • apixaban (ELIQUIS) 5 MG tablet tablet Take 1 tablet by mouth Every 12 (Twelve) Hours. 60 tablet  8/6/2019 at 0800   • cholecalciferol (VITAMIN D3) 1000 units tablet Take 1,000 Units by mouth Daily.   8/5/2019 at Unknown time   • flecainide (TAMBOCOR) 50 MG tablet Take 50 mg by mouth Every 12 (Twelve) Hours.   8/6/2019 at 0800   • FLUoxetine (PROzac) 20 MG capsule Take 40 mg by mouth Every Morning.   8/6/2019 at 0800   • insulin lispro (humaLOG) 100 UNIT/ML injection Inject 2-6 Units under the skin into the appropriate area as directed 3 (Three) Times a Day As Needed (before meals prn (2  units if  150-199, 4 units if > 199)).   8/5/2019 at Unknown time   • lactobacillus acidophilus (RISAQUAD) capsule capsule Take 1 capsule by mouth Daily. 30 capsule 0 8/6/2019 at 0800   • loratadine (CLARITIN) 10 MG tablet Take 10 mg by mouth 2 (Two) Times a Day.   8/6/2019 at Unknown time   • mometasone (ASMANEX) 220 MCG/INH inhaler Inhale 2 puffs Every Night.   8/5/2019 at Unknown time   • montelukast (SINGULAIR) 10 MG tablet Take 10 mg by mouth Every Night.   8/5/2019 at Unknown time   • nitroglycerin (NITROSTAT) 0.4 MG SL tablet Place 0.4 mg under the tongue as needed for chest pain.   Past Week at Unknown time   • omeprazole (priLOSEC) 40 MG capsule Take 80 mg by mouth Every Morning.   8/6/2019 at 0800   • ondansetron (ZOFRAN) 8 MG tablet Take 1 tablet by mouth Every 8 (Eight) Hours As Needed for Nausea or Vomiting. 60 tablet 3 Past Month at Unknown time   • oxyCODONE (oxyCONTIN) 10 MG 12 hr tablet Take 2 tablets by mouth Every 12 (Twelve) Hours. 120 tablet 0 Past Week at Unknown time   • oxyCODONE (ROXICODONE) 10 MG tablet Take 1-2 tabs every 4-6 hours as needed for pain (Patient taking differently: Take 5-10 mg by mouth Every 4 (Four) Hours As Needed. Take 1-2 tabs every 4-6 hours as needed for pain) 100 tablet 0 8/6/2019 at 0800   • prochlorperazine (COMPAZINE) 10 MG tablet Take 1 tablet by mouth Every 6 (Six) Hours As Needed for Nausea or Vomiting. 60 tablet 3 8/6/2019 at 0800   • senna-docusate (SENNA-S) 8.6-50 MG per tablet Take 2 tablets by mouth 2 (Two) Times a Day. 120 tablet 5 8/6/2019 at 0800   • dexamethasone (DECADRON) 4 MG tablet Take 2 tablets by mouth in the morning on days 2, 3, and 4 after chemo. 12 tablet 2 8/2/2019       Inpatient Medications:  Scheduled Meds:    allopurinol 100 mg Oral BID   apixaban 5 mg Oral Q12H   budesonide 0.5 mg Nebulization BID - RT   cefepime 2 g Intravenous Q8H   cetirizine 5 mg Oral Daily   cholecalciferol 1,000 Units Oral Daily   dronabinol 5 mg Oral BID    flecainide 50 mg Oral Q12H   FLUoxetine 40 mg Oral QAM   insulin aspart 0-7 Units Subcutaneous 4x Daily AC & at Bedtime   ipratropium-albuterol 3 mL Nebulization Q6H - RT   lactulose 20 g Oral Daily   lactulose 30 g Oral Once   midodrine 5 mg Oral TID AC   montelukast 10 mg Oral Nightly   ondansetron 8 mg Oral TID AC   oxyCODONE 10 mg Oral Q6H   oxyCODONE 20 mg Oral Q12H   pantoprazole 40 mg Oral QAM   polyethylene glycol 17 g Oral Daily   prochlorperazine 10 mg Oral Q6H   senna-docusate 2 tablet Oral Nightly   sodium chloride 10 mL Intravenous Q12H   tamsulosin 0.4 mg Oral Daily   vancomycin 1,000 mg Intravenous Q24H       Continuous Infusions:       PRN Meds:  •  acetaminophen  •  acetaminophen  •  dextrose  •  dextrose  •  diphenhydrAMINE  •  glucagon (human recombinant)  •  heparin flush (porcine)  •  magnesium sulfate **OR** magnesium sulfate **OR** magnesium sulfate  •  Morphine  •  nitroglycerin  •  ondansetron  •  sodium chloride  •  sodium chloride      Review of Systems:  A comprehensive 14 point review of systems was performed.  Significant findings as mentioned above.  All other systems reviewed and are negative.        Physical Exam:  Vital Signs: These were reviewed and listed as per patient’s electronic medical chart  Vitals:    09/03/19 0645   BP:    Pulse: 84   Resp: 18   Temp:    SpO2: 99%     General: Awake, alert and oriented, in no distress, chronically ill appearing, thin  HEENT: Head is atraumatic, normocephalic, extraocular movements full, oropharynx clear, no scleral icterus, pink dry mucous membranes, nasal canula in place 3L  Neck: no jvd or masses  Cardiovascular: regular rate and rhythm without murmurs, rubs or gallops  Pulmonary: non-labored, coarse breath sounds  Abdomen: soft, non-tender, non-distended, normal active bowel sounds present  Extremities: No clubbing, cyanosis or edema  Neurologic: Mental status as above, alert, awake and answers questions approprietly  Skin: warm, dry,  intact, right PAC       Labs / Studies:  Lab Results (last 72 hours)     Procedure Component Value Units Date/Time    POC Glucose Once [[id number]]  (Normal) Collected:  09/03/19 0700    Specimen:  Blood Updated:  09/03/19 0707     Glucose 118 mg/dL     C-reactive Protein [[id number]]  (Normal) Collected:  09/03/19 0315    Specimen:  Blood Updated:  09/03/19 0432     C-Reactive Protein 0.44 mg/dL     Comprehensive Metabolic Panel [[id number]]  (Abnormal) Collected:  09/03/19 0315    Specimen:  Blood Updated:  09/03/19 0432     Glucose 121 mg/dL      BUN 16 mg/dL      Creatinine 1.12 mg/dL      Sodium 138 mmol/L      Potassium 4.2 mmol/L      Chloride 104 mmol/L      CO2 24.2 mmol/L      Calcium 9.7 mg/dL      Total Protein 6.8 g/dL      Albumin 3.13 g/dL      ALT (SGPT) 9 U/L      AST (SGOT) 16 U/L      Alkaline Phosphatase 119 U/L      Total Bilirubin 0.3 mg/dL      eGFR Non African Amer 65 mL/min/1.73      Globulin 3.7 gm/dL      A/G Ratio 0.9 g/dL      BUN/Creatinine Ratio 14.3     Anion Gap 9.8 mmol/L     Narrative:       GFR Normal >60  Chronic Kidney Disease <60  Kidney Failure <15    CBC & Differential [[id number]] Collected:  09/03/19 0315    Specimen:  Blood Updated:  09/03/19 0405    Narrative:       The following orders were created for panel order CBC & Differential.  Procedure                               Abnormality         Status                     ---------                               -----------         ------                     CBC Auto Differential[[id number]]        Abnormal            Final result                 Please view results for these tests on the individual orders.    CBC Auto Differential [[id number]]  (Abnormal) Collected:  09/03/19 0315    Specimen:  Blood Updated:  09/03/19 0405     WBC 7.57 10*3/mm3      RBC 2.99 10*6/mm3      Hemoglobin 7.9 g/dL      Hematocrit 27.2 %      MCV 91.0 fL      MCH 26.4 pg      MCHC 29.0 g/dL      RDW 17.9 %      RDW-SD 57.1 fl      MPV 9.8 fL       Platelets 180 10*3/mm3      Neutrophil % 65.7 %      Lymphocyte % 17.8 %      Monocyte % 9.9 %      Eosinophil % 5.0 %      Basophil % 0.8 %      Immature Grans % 0.8 %      Neutrophils, Absolute 4.97 10*3/mm3      Lymphocytes, Absolute 1.35 10*3/mm3      Monocytes, Absolute 0.75 10*3/mm3      Eosinophils, Absolute 0.38 10*3/mm3      Basophils, Absolute 0.06 10*3/mm3      Immature Grans, Absolute 0.06 10*3/mm3     Blood Culture - Blood, Arm, Right [[id number]] Collected:  08/29/19 2022    Specimen:  Blood from Arm, Right Updated:  09/02/19 2042     Blood Culture No growth at 4 days    Blood Culture - Blood, Hand, Right [[id number]] Collected:  08/29/19 2023    Specimen:  Blood from Hand, Right Updated:  09/02/19 2042     Blood Culture No growth at 4 days    POC Glucose Once [[id number]]  (Normal) Collected:  09/02/19 1959    Specimen:  Blood Updated:  09/02/19 2005     Glucose 128 mg/dL     Cancer Antigen 19-9 [[id number]] Collected:  09/02/19 1634    Specimen:  Blood Updated:  09/02/19 1725    POC Glucose Once [[id number]]  (Abnormal) Collected:  09/02/19 1652    Specimen:  Blood Updated:  09/02/19 1724     Glucose 153 mg/dL     POC Glucose Once [[id number]]  (Normal) Collected:  09/02/19 1136    Specimen:  Blood Updated:  09/02/19 1202     Glucose 126 mg/dL     POC Glucose Once [[id number]]  (Normal) Collected:  09/02/19 0707    Specimen:  Blood Updated:  09/02/19 0714     Glucose 113 mg/dL     C-reactive Protein [[id number]]  (Abnormal) Collected:  09/02/19 0017    Specimen:  Blood Updated:  09/02/19 0030     C-Reactive Protein 0.76 mg/dL     Basic Metabolic Panel [[id number]]  (Abnormal) Collected:  09/02/19 0017    Specimen:  Blood Updated:  09/02/19 0030     Glucose 123 mg/dL      BUN 17 mg/dL      Creatinine 1.08 mg/dL      Sodium 138 mmol/L      Potassium 3.8 mmol/L      Chloride 102 mmol/L      CO2 25.5 mmol/L      Calcium 9.3 mg/dL      eGFR Non African Amer 67 mL/min/1.73      BUN/Creatinine Ratio 15.7     Anion  Gap 10.5 mmol/L     Narrative:       GFR Normal >60  Chronic Kidney Disease <60  Kidney Failure <15    CBC & Differential [[id number]] Collected:  09/02/19 0017    Specimen:  Blood Updated:  09/02/19 0021    Narrative:       The following orders were created for panel order CBC & Differential.  Procedure                               Abnormality         Status                     ---------                               -----------         ------                     CBC Auto Differential[[id number]]        Abnormal            Final result                 Please view results for these tests on the individual orders.    CBC Auto Differential [[id number]]  (Abnormal) Collected:  09/02/19 0017    Specimen:  Blood Updated:  09/02/19 0021     WBC 6.39 10*3/mm3      RBC 2.95 10*6/mm3      Hemoglobin 7.9 g/dL      Hematocrit 26.8 %      MCV 90.8 fL      MCH 26.8 pg      MCHC 29.5 g/dL      RDW 18.0 %      RDW-SD 57.1 fl      MPV 9.7 fL      Platelets 223 10*3/mm3      Neutrophil % 61.1 %      Lymphocyte % 17.8 %      Monocyte % 12.8 %      Eosinophil % 6.4 %      Basophil % 0.6 %      Immature Grans % 1.3 %      Neutrophils, Absolute 3.90 10*3/mm3      Lymphocytes, Absolute 1.14 10*3/mm3      Monocytes, Absolute 0.82 10*3/mm3      Eosinophils, Absolute 0.41 10*3/mm3      Basophils, Absolute 0.04 10*3/mm3      Immature Grans, Absolute 0.08 10*3/mm3     Vancomycin, Trough [[id number]]  (Abnormal) Collected:  09/01/19 2315    Specimen:  Blood Updated:  09/01/19 2355     Vancomycin Trough 20.90 mcg/mL     POC Glucose Once [[id number]]  (Abnormal) Collected:  09/01/19 2127    Specimen:  Blood Updated:  09/01/19 2152     Glucose 131 mg/dL     POC Glucose Once [[id number]]  (Abnormal) Collected:  09/01/19 1614    Specimen:  Blood Updated:  09/01/19 1623     Glucose 144 mg/dL     POC Glucose Once [[id number]]  (Abnormal) Collected:  09/01/19 1117    Specimen:  Blood Updated:  09/01/19 1125     Glucose 139 mg/dL     POC Glucose Once  [[id number]]  (Normal) Collected:  09/01/19 0715    Specimen:  Blood Updated:  09/01/19 0722     Glucose 120 mg/dL     C-reactive Protein [[id number]]  (Abnormal) Collected:  09/01/19 0441    Specimen:  Blood Updated:  09/01/19 0651     C-Reactive Protein 0.88 mg/dL     Basic Metabolic Panel [[id number]]  (Abnormal) Collected:  09/01/19 0441    Specimen:  Blood Updated:  09/01/19 0651     Glucose 122 mg/dL      BUN 17 mg/dL      Creatinine 1.26 mg/dL      Sodium 139 mmol/L      Potassium 3.8 mmol/L      Chloride 100 mmol/L      CO2 26.4 mmol/L      Calcium 9.4 mg/dL      eGFR Non African Amer 56 mL/min/1.73      BUN/Creatinine Ratio 13.5     Anion Gap 12.6 mmol/L     Narrative:       GFR Normal >60  Chronic Kidney Disease <60  Kidney Failure <15    CBC & Differential [[id number]] Collected:  09/01/19 0441    Specimen:  Blood Updated:  09/01/19 0621    Narrative:       The following orders were created for panel order CBC & Differential.  Procedure                               Abnormality         Status                     ---------                               -----------         ------                     CBC Auto Differential[[id number]]        Abnormal            Final result                 Please view results for these tests on the individual orders.    CBC Auto Differential [[id number]]  (Abnormal) Collected:  09/01/19 0441    Specimen:  Blood Updated:  09/01/19 0621     WBC 7.37 10*3/mm3      RBC 3.05 10*6/mm3      Hemoglobin 8.2 g/dL      Hematocrit 27.7 %      MCV 90.8 fL      MCH 26.9 pg      MCHC 29.6 g/dL      RDW 18.0 %      RDW-SD 57.9 fl      MPV 10.7 fL      Platelets 261 10*3/mm3      Neutrophil % 62.4 %      Lymphocyte % 18.7 %      Monocyte % 12.9 %      Eosinophil % 4.5 %      Basophil % 0.7 %      Immature Grans % 0.8 %      Neutrophils, Absolute 4.60 10*3/mm3      Lymphocytes, Absolute 1.38 10*3/mm3      Monocytes, Absolute 0.95 10*3/mm3      Eosinophils, Absolute 0.33 10*3/mm3      Basophils,  Absolute 0.05 10*3/mm3      Immature Grans, Absolute 0.06 10*3/mm3     POC Glucose Once [[id number]]  (Abnormal) Collected:  08/31/19 2122    Specimen:  Blood Updated:  08/31/19 2136     Glucose 161 mg/dL     POC Glucose Once [[id number]]  (Abnormal) Collected:  08/31/19 2047    Specimen:  Blood Updated:  08/31/19 2053     Glucose 183 mg/dL     POC Glucose Once [[id number]]  (Abnormal) Collected:  08/31/19 1619    Specimen:  Blood Updated:  08/31/19 1638     Glucose 138 mg/dL     POC Glucose Once [[id number]]  (Abnormal) Collected:  08/31/19 1128    Specimen:  Blood Updated:  08/31/19 1137     Glucose 131 mg/dL             Imaging Results (last 72 hours)     Procedure Component Value Units Date/Time    CT Head Without Contrast [[id number]] Collected:  09/02/19 1014     Updated:  09/02/19 1016    Narrative:       CT Head WO    HISTORY:   69-year-old male inpatient with a history of syncopal episode and fainting. Sepsis. Hypomagnesemia. Hyponatremia.    TECHNIQUE:   Axial unenhanced head CT. Radiation dose reduction techniques included automated exposure control or exposure modulation based on body size. Count of known CT and cardiac nuc med studies performed in previous 12 months: 28.     Time of scan: 0942 hours    COMPARISON:   8/13/2019    FINDINGS:   No intracranial hemorrhage, mass, or infarct. No hydrocephalus or extra-axial fluid collection. There are senescent changes, including volume loss and nonspecific white matter change, but no acute abnormality is seen. The skull base, calvarium, and  extracranial soft tissues are normal.      Impression:       Senescent changes without acute abnormality. No significant change..          Signer Name: Henri Holguin MD   Signed: 9/2/2019 10:14 AM   Workstation [facility]: [email or web address]    [facility]    CT Chest Without Contrast [[id number]] Collected:  09/01/19 1554     Updated:  09/01/19 1556    Narrative:       CT Chest WO    INDICATION:   69-year-old male  with shortness of air today. Pneumonia.    TECHNIQUE:   CT of the thorax without IV contrast. Coronal and sagittal reconstructions were obtained.  Radiation dose reduction techniques included automated exposure control or exposure modulation based on body size. Count of known CT and cardiac nuc med studies  performed in previous 12 months: 37.     COMPARISON:   CT chest 8/27/2019.  CT chest 12/19/2018.    FINDINGS:  Thoracic aorta normal in course and caliber. Heart size is upper limits. No pericardial effusion. Pneumomediastinum is not significantly changed from the prior examination. The esophagus is fluid-filled.    No pleural effusions. No pneumothorax. No significant interval improvement in patchy basilar predominant infiltrates throughout both lungs. Cannot exclude an underlying component of interstitial lung disease/fibrosis. Follow-up to resolution recommended.     Visualized upper abdomen is unremarkable. Suspected punctate bilateral nonobstructing intrarenal calculi. Chronic appearing compression fractures in the lower thoracic spine. No other acute osseous abnormality.      Impression:         1. Pneumomediastinum, unchanged from the prior examination 8/27/2019.  2. No interval improvement in patchy basilar predominant infiltrates throughout both lungs. Cannot exclude an underlying component of interstitial lung disease/fibrosis. Follow-up to resolution recommended.    Signer Name: Joel Suazo MD   Signed: 9/1/2019 3:54 PM   [email or web address]    [facility]    XR Chest 1 View [[id number]] Collected:  08/31/19 1026     Updated:  08/31/19 1028    Narrative:       CR Chest 1 Vw    INDICATION:   Respiratory failure and pneumonia. Sepsis in a 69-year-old male.     COMPARISON:    8/28/2019    FINDINGS:  Single portable AP view(s) of the chest.    Right-sided Mediport catheter unchanged.    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  Background interstitial fibrosis. More confluent opacities in the peripheral mid and lower lung zones bilaterally do not appear appreciably changed. Minimal blunting of  the CP angles bilaterally is stable. No pneumothorax.       Impression:       No significant interval change from 8/28/2019.    Signer Name: Henri Holguin MD   Signed: 8/31/2019 10:26 AM   Workstation Name: [email or web address]-    [facility] [place]                Assessment & Plan:  Todd Phillips is a 69 y.o. year-old male presenting with:    1. Metastatic pancreatic cancer  - Most recent CA 19-9 has been increasing (most recently drawn CA 19-9 is pending). He does have a PET scan scheduled this week however patient is currently hospitalized   - Patient notes that he may possibly be discharged with oral antibiotics pending pulmonary evaluation  - Dr. Alves previously has had a long discussion with them in regards to hospice and the palliative care team has followed with the patient however ultimately have have declined hospice at present and wanted to continue with antibiotics to see if he would improve. Most recent CT scan is stable without improvement.       2. Neoplastic related pain  - Son at bedside notes that this has been controlled with scheduled pain medications. Patient was encouraged on prn usage should he have pain. If he continues t    3. Constipation  - Patient has Mirlax, Senokot and Lactulose ordered. I ordered another dose of lactulose this am. Can increase his scheduled medications if he continues to have pain however at present appears to be comfortable.    4. Nausea/Poor appetite  - Agree with increase in Marinol and son notes that he did eat better yesterday  - He does have scheduled zofran and given his ongoing complaints of nausea will also schedule compazine for the next two days      We appreciate the assistance of the hospitalist team in caring for Mr. Phillips. Will continue to follow with you. Please do not  hesitate to call with any questions / concerns.      Faiza Toro MD  09/03/19  9:59 AM

## 2019-09-03 NOTE — PLAN OF CARE
Problem: Pain, Chronic (Adult)  Goal: Identify Related Risk Factors and Signs and Symptoms  Outcome: Ongoing (interventions implemented as appropriate)   08/25/19 1625   Pain, Chronic (Adult)   Related Risk Factors (Chronic Pain) disease process   Signs and Symptoms (Chronic Pain) verbalization of pain/discomfort for a prolonged time period     Goal: Acceptable Pain/Comfort Level and Functional Ability  Outcome: Ongoing (interventions implemented as appropriate)   09/03/19 1126   Pain, Chronic (Adult)   Acceptable Pain/Comfort Level and Functional Ability making progress toward outcome       Problem: Fall Risk (Adult)  Goal: Identify Related Risk Factors and Signs and Symptoms  Outcome: Ongoing (interventions implemented as appropriate)   08/25/19 1625   Fall Risk (Adult)   Related Risk Factors (Fall Risk) environment unfamiliar;fatigue/slow reaction;gait/mobility problems   Signs and Symptoms (Fall Risk) presence of risk factors     Goal: Absence of Fall  Outcome: Ongoing (interventions implemented as appropriate)   09/03/19 1126   Fall Risk (Adult)   Absence of Fall making progress toward outcome       Problem: Skin Injury Risk (Adult)  Goal: Identify Related Risk Factors and Signs and Symptoms  Outcome: Ongoing (interventions implemented as appropriate)   08/25/19 1625   Skin Injury Risk (Adult)   Related Risk Factors (Skin Injury Risk) mobility impaired     Goal: Skin Health and Integrity  Outcome: Ongoing (interventions implemented as appropriate)   09/03/19 1126   Skin Injury Risk (Adult)   Skin Health and Integrity making progress toward outcome       Problem: Mobility, Physical Impaired (Adult)  Goal: Enhanced Functional Ability  Outcome: Ongoing (interventions implemented as appropriate)   09/03/19 1126   Mobility, Physical Impaired (Adult)   Enhanced Functional Ability making progress toward outcome

## 2019-09-03 NOTE — PROGRESS NOTES
Antimicrobial Length of Therapy:    Day 7 of 7 cefepime  Day 7 of 7 vancomycin IV    Thank you.  Cinthia Nettles, Pharm.D.  9/3/2019  10:32 AM

## 2019-09-03 NOTE — PROGRESS NOTES
Discharge Planning Assessment   Jameel     Patient Name: Todd Phillips  MRN: [id number]  Today's Date: 9/3/2019    Admit Date: 8/6/2019        Discharge Plan     Row Name 09/03/19 1607       Plan    Plan Pt was discussed in [facility] today. Dr. Rao discussed hospice services with pt's family on this date. SS to follow and assist as needed with discharge planning.         MARTIN Cronin

## 2019-09-03 NOTE — PROGRESS NOTES
Assisted By: Salome BEE    CC: F/U Pancreatic cancer    Interview History/HPI: Due to patient's drowsiness he cannot give a history, family reports that he ate a little bit this a.m., he ate fairly well for supper last night.  He really cannot tell me about his pain, he will open his eyes but does not really volunteer to answer questions.      Vitals:    09/03/19 1711   BP: 106/58   Pulse: 97   Resp:    Temp:    SpO2:        Intake/Output Summary (Last 24 hours) at 9/3/2019 1818  Last data filed at 9/3/2019 1421  Gross per 24 hour   Intake 340 ml   Output 1075 ml   Net -735 ml       EXAM: Respiratory rate is 12, temperature is 98.1, saturation on 2 L 96%.  He is somnolent, pupils are equal he appears chronically ill, lungs have bilateral breath sounds with some crackles laterally at the bases heart regular rate and rhythm without murmur rub or gallop, abdomen is soft no definite tenderness is elicited, trace edema still squeezes both hands      Tele: Sinus rhythm, reviewed    LABS:   Lab Results (last 48 hours)     Procedure Component Value Units Date/Time    POC Glucose Once [[id number]]  (Normal) Collected:  09/03/19 1703    Specimen:  Blood Updated:  09/03/19 1709     Glucose 119 mg/dL     Cancer Antigen 19-9 [[id number]]  (Abnormal) Collected:  09/02/19 1634    Specimen:  Blood Updated:  09/03/19 1515     CA 19-9 134.3 U/mL     POC Glucose Once [[id number]]  (Normal) Collected:  09/03/19 1223    Specimen:  Blood Updated:  09/03/19 1230     Glucose 124 mg/dL     POC Glucose Once [[id number]]  (Normal) Collected:  09/03/19 0700    Specimen:  Blood Updated:  09/03/19 0707     Glucose 118 mg/dL     C-reactive Protein [[id number]]  (Normal) Collected:  09/03/19 0315    Specimen:  Blood Updated:  09/03/19 0432     C-Reactive Protein 0.44 mg/dL     Comprehensive Metabolic Panel [[id number]]  (Abnormal) Collected:  09/03/19 0315    Specimen:  Blood Updated:  09/03/19 0432     Glucose 121 mg/dL      BUN 16 mg/dL       Creatinine 1.12 mg/dL      Sodium 138 mmol/L      Potassium 4.2 mmol/L      Chloride 104 mmol/L      CO2 24.2 mmol/L      Calcium 9.7 mg/dL      Total Protein 6.8 g/dL      Albumin 3.13 g/dL      ALT (SGPT) 9 U/L      AST (SGOT) 16 U/L      Alkaline Phosphatase 119 U/L      Total Bilirubin 0.3 mg/dL      eGFR Non African Amer 65 mL/min/1.73      Globulin 3.7 gm/dL      A/G Ratio 0.9 g/dL      BUN/Creatinine Ratio 14.3     Anion Gap 9.8 mmol/L     Narrative:       GFR Normal >60  Chronic Kidney Disease <60  Kidney Failure <15    CBC & Differential [[id number]] Collected:  09/03/19 0315    Specimen:  Blood Updated:  09/03/19 0405    Narrative:       The following orders were created for panel order CBC & Differential.  Procedure                               Abnormality         Status                     ---------                               -----------         ------                     CBC Auto Differential[[id number]]        Abnormal            Final result                 Please view results for these tests on the individual orders.    CBC Auto Differential [[id number]]  (Abnormal) Collected:  09/03/19 0315    Specimen:  Blood Updated:  09/03/19 0405     WBC 7.57 10*3/mm3      RBC 2.99 10*6/mm3      Hemoglobin 7.9 g/dL      Hematocrit 27.2 %      MCV 91.0 fL      MCH 26.4 pg      MCHC 29.0 g/dL      RDW 17.9 %      RDW-SD 57.1 fl      MPV 9.8 fL      Platelets 180 10*3/mm3      Neutrophil % 65.7 %      Lymphocyte % 17.8 %      Monocyte % 9.9 %      Eosinophil % 5.0 %      Basophil % 0.8 %      Immature Grans % 0.8 %      Neutrophils, Absolute 4.97 10*3/mm3      Lymphocytes, Absolute 1.35 10*3/mm3      Monocytes, Absolute 0.75 10*3/mm3      Eosinophils, Absolute 0.38 10*3/mm3      Basophils, Absolute 0.06 10*3/mm3      Immature Grans, Absolute 0.06 10*3/mm3     Blood Culture - Blood, Arm, Right [[id number]] Collected:  08/29/19 2022    Specimen:  Blood from Arm, Right Updated:  09/02/19 2042     Blood Culture No  growth at 4 days    Blood Culture - Blood, Hand, Right [[id number]] Collected:  08/29/19 2023    Specimen:  Blood from Hand, Right Updated:  09/02/19 2042     Blood Culture No growth at 4 days    POC Glucose Once [[id number]]  (Normal) Collected:  09/02/19 1959    Specimen:  Blood Updated:  09/02/19 2005     Glucose 128 mg/dL     POC Glucose Once [[id number]]  (Abnormal) Collected:  09/02/19 1652    Specimen:  Blood Updated:  09/02/19 1724     Glucose 153 mg/dL     POC Glucose Once [[id number]]  (Normal) Collected:  09/02/19 1136    Specimen:  Blood Updated:  09/02/19 1202     Glucose 126 mg/dL     POC Glucose Once [[id number]]  (Normal) Collected:  09/02/19 0707    Specimen:  Blood Updated:  09/02/19 0714     Glucose 113 mg/dL     C-reactive Protein [[id number]]  (Abnormal) Collected:  09/02/19 0017    Specimen:  Blood Updated:  09/02/19 0030     C-Reactive Protein 0.76 mg/dL     Basic Metabolic Panel [[id number]]  (Abnormal) Collected:  09/02/19 0017    Specimen:  Blood Updated:  09/02/19 0030     Glucose 123 mg/dL      BUN 17 mg/dL      Creatinine 1.08 mg/dL      Sodium 138 mmol/L      Potassium 3.8 mmol/L      Chloride 102 mmol/L      CO2 25.5 mmol/L      Calcium 9.3 mg/dL      eGFR Non African Amer 67 mL/min/1.73      BUN/Creatinine Ratio 15.7     Anion Gap 10.5 mmol/L     Narrative:       GFR Normal >60  Chronic Kidney Disease <60  Kidney Failure <15    CBC & Differential [[id number]] Collected:  09/02/19 0017    Specimen:  Blood Updated:  09/02/19 0021    Narrative:       The following orders were created for panel order CBC & Differential.  Procedure                               Abnormality         Status                     ---------                               -----------         ------                     CBC Auto Differential[[id number]]        Abnormal            Final result                 Please view results for these tests on the individual orders.    CBC Auto Differential [[id number]]  (Abnormal)  Collected:  09/02/19 0017    Specimen:  Blood Updated:  09/02/19 0021     WBC 6.39 10*3/mm3      RBC 2.95 10*6/mm3      Hemoglobin 7.9 g/dL      Hematocrit 26.8 %      MCV 90.8 fL      MCH 26.8 pg      MCHC 29.5 g/dL      RDW 18.0 %      RDW-SD 57.1 fl      MPV 9.7 fL      Platelets 223 10*3/mm3      Neutrophil % 61.1 %      Lymphocyte % 17.8 %      Monocyte % 12.8 %      Eosinophil % 6.4 %      Basophil % 0.6 %      Immature Grans % 1.3 %      Neutrophils, Absolute 3.90 10*3/mm3      Lymphocytes, Absolute 1.14 10*3/mm3      Monocytes, Absolute 0.82 10*3/mm3      Eosinophils, Absolute 0.41 10*3/mm3      Basophils, Absolute 0.04 10*3/mm3      Immature Grans, Absolute 0.08 10*3/mm3     Vancomycin, Trough [[id number]]  (Abnormal) Collected:  09/01/19 2315    Specimen:  Blood Updated:  09/01/19 2355     Vancomycin Trough 20.90 mcg/mL     POC Glucose Once [[id number]]  (Abnormal) Collected:  09/01/19 2127    Specimen:  Blood Updated:  09/01/19 2152     Glucose 131 mg/dL           Radiology:  Imaging Results (last 72 hours)     Procedure Component Value Units Date/Time    CT Head Without Contrast [[id number]] Collected:  09/02/19 1014     Updated:  09/02/19 1016    Narrative:       CT Head WO    HISTORY:   69-year-old male inpatient with a history of syncopal episode and fainting. Sepsis. Hypomagnesemia. Hyponatremia.    TECHNIQUE:   Axial unenhanced head CT. Radiation dose reduction techniques included automated exposure control or exposure modulation based on body size. Count of known CT and cardiac nuc med studies performed in previous 12 months: 28.     Time of scan: 0942 hours    COMPARISON:   8/13/2019    FINDINGS:   No intracranial hemorrhage, mass, or infarct. No hydrocephalus or extra-axial fluid collection. There are senescent changes, including volume loss and nonspecific white matter change, but no acute abnormality is seen. The skull base, calvarium, and  extracranial soft tissues are normal.      Impression:        Senescent changes without acute abnormality. No significant change..          Signer Name: Henri Holguin MD   Signed: 9/2/2019 10:14 AM   Workstation Name: [email or web address]    [facility]    CT Chest Without Contrast [[id number]] Collected:  09/01/19 1554     Updated:  09/01/19 1556    Narrative:       CT Chest WO    INDICATION:   69-year-old male with shortness of air today. Pneumonia.    TECHNIQUE:   CT of the thorax without IV contrast. Coronal and sagittal reconstructions were obtained.  Radiation dose reduction techniques included automated exposure control or exposure modulation based on body size. Count of known CT and cardiac nuc med studies  performed in previous 12 months: 37.     COMPARISON:   CT chest 8/27/2019.  CT chest 12/19/2018.    FINDINGS:  Thoracic aorta normal in course and caliber. Heart size is upper limits. No pericardial effusion. Pneumomediastinum is not significantly changed from the prior examination. The esophagus is fluid-filled.    No pleural effusions. No pneumothorax. No significant interval improvement in patchy basilar predominant infiltrates throughout both lungs. Cannot exclude an underlying component of interstitial lung disease/fibrosis. Follow-up to resolution recommended.     Visualized upper abdomen is unremarkable. Suspected punctate bilateral nonobstructing intrarenal calculi. Chronic appearing compression fractures in the lower thoracic spine. No other acute osseous abnormality.      Impression:         1. Pneumomediastinum, unchanged from the prior examination 8/27/2019.  2. No interval improvement in patchy basilar predominant infiltrates throughout both lungs. Cannot exclude an underlying component of interstitial lung disease/fibrosis. Follow-up to resolution recommended.    Signer Name: Joel Suazo MD   Signed: 9/1/2019 3:54 PM   Workstation Name: [facility]    [facility]            Results for orders placed during  the hospital encounter of 08/06/19   Adult Transthoracic Echo Complete W/ Cont if Necessary Per Protocol    Narrative · Technically limited and difficult study due to poor echo window. Only   apical and subcostal views are obtained...  · Left ventricular systolic function is normal.  · Unable to estimate LVEF and to evaluate wall motion as endocardial   border is not distinctly seen.  · Left ventricular diastolic dysfunction.  · Normal cardiac chamber dimensions.  · The aortic valve is not well visualized.  · The mitral valve is normal in structure. No mitral valve regurgitation   is present. No significant mitral valve stenosis is present.  · The tricuspid valve is normal. No evidence of tricuspid valve stenosis   is present. No tricuspid valve regurgitation is present.  · The pulmonic valve is not well visualized.  · There is no evidence of pericardial effusion.            Assessment/Plan:   Metastatic pancreatic cancer.  Patient was really too drowsy to participate in the conversation.  I have explained to the patient's family that this may be a direct side effect of the Marinol.  It does not appear to have increased his appetite therefore this is being stopped, I am also decreasing his oxycodone.  I have asked this to be held for any sedation because the patient's family would like to have the patient participate in any discussions about hospice.  I have explained to them that the CA 19-9 continues to rise.  With his weakened condition I do not feel he will be able to get to chemotherapy any further.  Patient is desaturating when he stands up even with oxygen.  He still has evidence of an interstitial pattern on his imaging and his CRP is now normal, he is completed his 7 days of repeat antibiotic therapy with no improvement.  Patient's attempted diuresis failed as he became near syncopal versus syncopal and his BNP was normal.  I am very concerned about lymphangitic spread of his cancer.  I have discussed this  case also with oncology  as well as palliative care.  The son who is respiratory therapist was inquiring about a Ventimask when he goes home.  I have explained that unless he is under hospice I would not feel comfortable with this.  I have explained that if we want to get him a home that hospice is probably the best route to go.  His prognosis is very poor.  I have explained to the patient's wife that I do not think he will be able to get to the PET scan on Friday even if he is home.  She was understanding of this and had suspected this already.    Respiratory failure, discussion as above    History of paroxysmal atrial fibrillation, on Eliquis    Constipation, continue MiraLAX and lactulose

## 2019-09-03 NOTE — THERAPY RE-EVALUATION
Acute Care - Physical Therapy Re-Evaluation   Jameel     Patient Name: Todd Phillips  : 1950  MRN: [id number]  Today's Date: 9/3/2019   Onset of Illness/Injury or Date of Surgery: 19  Date of Referral to PT: 19  Referring Physician: Dr. Harley      Admit Date: 2019    Visit Dx:     ICD-10-CM ICD-9-CM   1. Sepsis, due to unspecified organism (CMS/HCC) A41.9 038.9     995.91   2. Dehydration with hyponatremia E87.1 276.1   3. 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  Transfuse 19   • Fever   • Post-operative confusion and somnolence, likely due to oversedation   • PAF on home Eliquis (CMS/HCC)   • Adult necrotizing enterocolitis (CMS/HCC)   • Encephalopathy   • Intra-abdominal abscess (S/P Percutaneous drain)   • Metabolic encephalopathy   • Dehydration   • Nausea   • Iron deficiency anemia secondary to inadequate dietary iron intake   • Malabsorption of iron   • Pancytopenia due to antineoplastic chemotherapy (CMS/HCC)   • Minimal pulmonary infiltrate left lower lobe.  Cannot rule out early pneumonia   • Possible bacterial enteritis seen on CT   • Sepsis (CMS/HCC)     Past Medical History:   Diagnosis Date   • Antral gastritis    • Asthma    • Atrial fibrillation (CMS/HCC)      treated with oral blood thinner   • Chest pain    • COPD (chronic obstructive pulmonary disease) (CMS/HCC)    • Coronary artery disease     no stents   • Diabetes mellitus (CMS/HCC)     type 2 - checks sugar 4 times per day    • Duodenitis    • Elevated cholesterol    • Emphysema of lung (CMS/HCC)    • Gallbladder disease     removed    • GERD (gastroesophageal reflux disease)    • Hard to intubate     busted lip last time   • Hyperlipidemia    • Hypertension    • Jaundice    • Kidney stone    • Kidney stones     had lithotripsy and passed 36 kidney stones as well as had one surgically removed.   • Malignant neoplasm of head of pancreas (CMS/HCC) 9/5/2018   • Nausea    • Obstructive sleep apnea on CPAP     compliant with machine    • Palpitations    • Pneumonia 08/2018   • Reflux esophagitis    • Renal failure     stage 3 kidney disease   • Sepsis (CMS/HCC)      Past Surgical History:   Procedure Laterality Date   • BILE DUCT STENT PLACEMENT      [facility] 2 weeks ago    • CARDIAC CATHETERIZATION  11/01/1999   • CARDIOVASCULAR STRESS TEST  09/2012   • CHOLECYSTECTOMY N/A 8/10/2018    Procedure: CHOLECYSTECTOMY LAPAROSCOPIC;  Surgeon: Ronak Downs MD;  Location: [facility] OR;  Service: General   • COLONOSCOPY     • CYSTOSCOPY BLADDER STONE LITHOTRIPSY     • ECHO - CONVERTED  09/2012   • ERCP N/A 8/14/2018    Procedure: ENDOSCOPIC RETROGRADE CHOLANGIOPANCREATOGRAPHY WITH PAPILLOTOMY;  Surgeon: Scot Su MD;  Location: [facility] OR;  Service: Gastroenterology   • ERCP N/A 10/1/2018    Procedure: ENDOSCOPIC RETROGRADE CHOLANGIOPANCREATOGRAPHY;  Surgeon: Jefry Luna MD;  Location: [facility]placed by [facility] ENDOSCOPY;  Service: Gastroenterology   • KIDNEY STONE SURGERY     • KIDNEY STONE SURGERY      open abdominal surgery   • PORTACATH PLACEMENT Right 10/23/2018    Procedure: INSERTION OF PORTACATH;  Surgeon: Ronak Downs MD;  Location: [facility] OR;  Service: General   • TONSILLECTOMY     •  UPPER GASTROINTESTINAL ENDOSCOPY  08/30/2012   • WHIPPLE PROCEDURE N/A 12/31/2018    Procedure: WHIPPLE PROCEDURE, BIOPSY OF LIVER, PORTAL VEIN RESECTION AND REPAIR, G/J TUBE PLACEMENT;  Surgeon: Miquel Brody MD;  Location: [facility];  Service: General        PT ASSESSMENT (last 12 hours)      Physical Therapy Evaluation    No documentation.       Physical Therapy Education     Title: PT OT SLP Therapies (In Progress)     Topic: Physical Therapy (In Progress)     Point: Mobility training (In Progress)     Learning Progress Summary           Patient Acceptance, E, VU by BC at 9/3/2019  2:38 PM    Acceptance, E, NR by LM at 9/3/2019 11:26 AM    Acceptance, E, NR by TT at 9/2/2019  2:54 PM    Acceptance, E,TB, VU by RF at 8/28/2019  3:02 PM    Acceptance, E, VU by LM at 8/28/2019 10:20 AM    Acceptance, E,D, VU,NR by LL at 8/27/2019  4:11 PM    Acceptance, E, VU by CL at 8/24/2019  2:27 PM    Acceptance, E,TB, VU by LJ at 8/23/2019 10:53 AM    Acceptance, E,TB, VU by BC1 at 8/22/2019 11:58 PM    Acceptance, E, VU by BC at 8/22/2019 11:36 AM    Acceptance, E, VU by BC at 8/22/2019 11:36 AM    Acceptance, E,TB, VU by LJ at 8/22/2019 10:58 AM    Acceptance, E,TB, VU by LJ at 8/18/2019 10:29 AM    Acceptance, E,TB, VU by LJ at 8/16/2019 12:22 PM    Acceptance, E,TB, VU by AD at 8/16/2019 10:16 AM    Acceptance, D, DU by RT at 8/15/2019  3:29 PM    Acceptance, E, NR by BC at 8/14/2019  4:19 PM   Family Acceptance, E, NR by TT at 9/2/2019  2:54 PM    Acceptance, E, VU by CL at 8/24/2019  2:27 PM                   Point: Home exercise program (In Progress)     Learning Progress Summary           Patient Acceptance, E, VU by BC at 9/3/2019  2:38 PM    Acceptance, E, NR by LM at 9/3/2019 11:26 AM    Acceptance, E, NR by TT at 9/2/2019  2:54 PM    Acceptance, E,TB, VU by RF at 8/28/2019  3:02 PM    Acceptance, E, VU by LM at 8/28/2019 10:20 AM    Acceptance, E,D, VU,NR by LL at 8/27/2019  4:11 PM    Acceptance, E, VU  by CL at 8/24/2019  2:27 PM    Acceptance, E,TB, VU by LJ at 8/23/2019 10:53 AM    Acceptance, E,TB, VU by BC1 at 8/22/2019 11:58 PM    Acceptance, E, VU by BC at 8/22/2019 11:36 AM    Acceptance, E, VU by BC at 8/22/2019 11:36 AM    Acceptance, E,TB, VU by LJ at 8/22/2019 10:58 AM    Acceptance, E,TB, VU by LJ at 8/18/2019 10:29 AM    Acceptance, E,TB, VU by LJ at 8/16/2019 12:22 PM    Acceptance, E,TB, VU by AD at 8/16/2019 10:16 AM    Acceptance, D, DU by RT at 8/15/2019  3:29 PM    Acceptance, E, NR by BC at 8/14/2019  4:19 PM   Family Acceptance, E, NR by TT at 9/2/2019  2:54 PM    Acceptance, E, VU by CL at 8/24/2019  2:27 PM                   Point: Body mechanics (In Progress)     Learning Progress Summary           Patient Acceptance, E, VU by BC at 9/3/2019  2:38 PM    Acceptance, E, NR by LM at 9/3/2019 11:26 AM    Acceptance, E, NR by TT at 9/2/2019  2:54 PM    Acceptance, E,TB, VU by RF at 8/28/2019  3:02 PM    Acceptance, E, VU by LM at 8/28/2019 10:20 AM    Acceptance, E,D, VU,NR by LL at 8/27/2019  4:11 PM    Acceptance, E, VU by CL at 8/24/2019  2:27 PM    Acceptance, E,TB, VU by LJ at 8/23/2019 10:53 AM    Acceptance, E,TB, VU by BC1 at 8/22/2019 11:58 PM    Acceptance, E, VU by BC at 8/22/2019 11:36 AM    Acceptance, E, VU by BC at 8/22/2019 11:36 AM    Acceptance, E,TB, VU by LJ at 8/22/2019 10:58 AM    Acceptance, E,TB, VU by LJ at 8/18/2019 10:29 AM    Acceptance, E,TB, VU by LJ at 8/16/2019 12:22 PM    Acceptance, E,TB, VU by AD at 8/16/2019 10:16 AM    Acceptance, D, DU by RT at 8/15/2019  3:29 PM    Acceptance, E, NR by BC at 8/14/2019  4:19 PM   Family Acceptance, E, NR by TT at 9/2/2019  2:54 PM    Acceptance, E, VU by CL at 8/24/2019  2:27 PM                   Point: Precautions (In Progress)     Learning Progress Summary           Patient Acceptance, E, VU by BC at 9/3/2019  2:38 PM    Acceptance, E, NR by LM at 9/3/2019 11:26 AM    Acceptance, E, NR by TT at 9/2/2019  2:54 PM     Acceptance, E,TB, VU by RF at 8/28/2019  3:02 PM    Acceptance, E, VU by LM at 8/28/2019 10:20 AM    Acceptance, E,D, VU,NR by LL at 8/27/2019  4:11 PM    Acceptance, E, VU by CL at 8/24/2019  2:27 PM    Acceptance, E,TB, VU by LJ at 8/23/2019 10:53 AM    Acceptance, E,TB, VU by BC1 at 8/22/2019 11:58 PM    Acceptance, E, VU by BC at 8/22/2019 11:36 AM    Acceptance, E, VU by BC at 8/22/2019 11:36 AM    Acceptance, E,TB, VU by LJ at 8/22/2019 10:58 AM    Acceptance, E,TB, VU by LJ at 8/18/2019 10:29 AM    Acceptance, E,TB, VU by LJ at 8/16/2019 12:22 PM    Acceptance, E,TB, VU by AD at 8/16/2019 10:16 AM    Acceptance, D, DU by RT at 8/15/2019  3:29 PM    Acceptance, E, NR by BC at 8/14/2019  4:19 PM   Family Acceptance, E, NR by TT at 9/2/2019  2:54 PM    Acceptance, E, VU by CL at 8/24/2019  2:27 PM                               User Key     Initials Effective Dates Name Provider Type Discipline    CL 06/16/16 -  Yeny Lopez, RN Registered Nurse Nurse    AD 04/03/18 - 09/02/19 Dalton, Ashley Claudene, PT Physical Therapist PT    TT 06/08/18 -  Freya Swanson, RN Registered Nurse Nurse    BC 03/14/16 -  Jeniffer Guadarrama, PT Physical Therapist PT    LL 05/02/16 -  Maribeth Dacosta, EDWARD Physical Therapy Assistant PT    RT 04/03/18 -  Faisal Marcos, PT Physical Therapist PT    LJ 04/30/18 -  Darlyn Roberson, RN Registered Nurse Nurse    RF 03/07/18 -  Krystal Oviedo, PTA Physical Therapy Assistant PT    LM 08/30/18 -  Salome Zurita, RN Registered Nurse Nurse    [facility] 11/14/18 -  Judah Daniels RN Registered Nurse Nurse              PT Recommendation and Plan  Planned Therapy Interventions (PT Eval): balance training, bed mobility training, gait training, home exercise program, patient/family education, strengthening, transfer training  Therapy Frequency (PT Clinical Impression): (3-5x/week)  Plan of Care Reviewed With: patient  Progress: no change  Outcome Measures     Row Name 09/03/19 1400              How much help from another person do you currently need...    Turning from your back to your side while in flat bed without using bedrails?  2  -BC      Moving from lying on back to sitting on the side of a flat bed without bedrails?  2  -BC      Moving to and from a bed to a chair (including a wheelchair)?  2  -BC      Standing up from a chair using your arms (e.g., wheelchair, bedside chair)?  2  -BC      Climbing 3-5 steps with a railing?  1  -BC      To walk in hospital room?  2  -BC      AM-PAC 6 Clicks Score (PT)  11  -BC         Functional Assessment    Outcome Measure Options  AM-PAC 6 Clicks Basic Mobility (PT)  -BC        User Key  (r) = Recorded By, (t) = Taken By, (c) = Cosigned By    Initials Name Provider Type    Jeniffer Huang, PT Physical Therapist         Time Calculation:   PT Charges     Row Name 09/03/19 1444             Time Calculation    PT Received On  09/03/19  -BC         Time Calculation- PT    Total Timed Code Minutes- PT  60 minute(s)  -BC         Timed Charges    94013 - PT Therapeutic Exercise Minutes  15  -BC      27695 - PT Therapeutic Activity Minutes  15  -BC        User Key  (r) = Recorded By, (t) = Taken By, (c) = Cosigned By    Initials Name Provider Type    Jeniffer Huang, PT Physical Therapist        Therapy Charges for Today     Code Description Service Date Service Provider Modifiers Qty    73101487200  PT THER PROC EA 15 MIN 9/3/2019 Jeniffer Guadarrama, PT GP 1    14523545733 HC PT THERAPEUTIC ACT EA 15 MIN 9/3/2019 Jeniffer Guadarrama, PT GP 1          PT G-Codes  Outcome Measure Options: AM-PAC 6 Clicks Basic Mobility (PT)  AM-PAC 6 Clicks Score (PT): 11      Jeniffer Guadarrama PT  9/3/2019

## 2019-09-04 ENCOUNTER — APPOINTMENT (OUTPATIENT)
Dept: GENERAL RADIOLOGY | Facility: HOSPITAL | Age: 69
End: 2019-09-04

## 2019-09-04 LAB
A-A DO2: 178.1 MMHG (ref 0–300)
ANION GAP SERPL CALCULATED.3IONS-SCNC: 12.9 MMOL/L (ref 5–15)
ARTERIAL PATENCY WRIST A: POSITIVE
ATMOSPHERIC PRESS: 729 MMHG
BASE EXCESS BLDA CALC-SCNC: -2.7 MMOL/L
BASOPHILS # BLD AUTO: 0.04 10*3/MM3 (ref 0–0.2)
BASOPHILS NFR BLD AUTO: 0.5 % (ref 0–1.5)
BDY SITE: ABNORMAL
BUN BLD-MCNC: 19 MG/DL (ref 8–23)
BUN/CREAT SERPL: 15.3 (ref 7–25)
CALCIUM SPEC-SCNC: 10.1 MG/DL (ref 8.6–10.5)
CHLORIDE SERPL-SCNC: 103 MMOL/L (ref 98–107)
CO2 SERPL-SCNC: 22.1 MMOL/L (ref 22–29)
COHGB MFR BLD: 1.4 % (ref 0–5)
CREAT BLD-MCNC: 1.24 MG/DL (ref 0.76–1.27)
CRP SERPL-MCNC: 0.38 MG/DL (ref 0–0.5)
DEPRECATED RDW RBC AUTO: 55.9 FL (ref 37–54)
EOSINOPHIL # BLD AUTO: 0.46 10*3/MM3 (ref 0–0.4)
EOSINOPHIL NFR BLD AUTO: 6.2 % (ref 0.3–6.2)
ERYTHROCYTE [DISTWIDTH] IN BLOOD BY AUTOMATED COUNT: 17.7 % (ref 12.3–15.4)
GAS FLOW AIRWAY: 5 LPM
GFR SERPL CREATININE-BSD FRML MDRD: 57 ML/MIN/1.73
GLUCOSE BLD-MCNC: 135 MG/DL (ref 65–99)
GLUCOSE BLDC GLUCOMTR-MCNC: 110 MG/DL (ref 70–130)
GLUCOSE BLDC GLUCOMTR-MCNC: 134 MG/DL (ref 70–130)
GLUCOSE BLDC GLUCOMTR-MCNC: 141 MG/DL (ref 70–130)
GLUCOSE BLDC GLUCOMTR-MCNC: 145 MG/DL (ref 70–130)
HCO3 BLDA-SCNC: 20.7 MMOL/L (ref 22–26)
HCT VFR BLD AUTO: 28.4 % (ref 37.5–51)
HCT VFR BLD CALC: 30 % (ref 42–52)
HGB BLD-MCNC: 8.5 G/DL (ref 13–17.7)
HGB BLDA-MCNC: 10.1 G/DL (ref 12–16)
HOROWITZ INDEX BLD+IHG-RTO: 40 %
IMM GRANULOCYTES # BLD AUTO: 0.04 10*3/MM3 (ref 0–0.05)
IMM GRANULOCYTES NFR BLD AUTO: 0.5 % (ref 0–0.5)
LYMPHOCYTES # BLD AUTO: 1.87 10*3/MM3 (ref 0.7–3.1)
LYMPHOCYTES NFR BLD AUTO: 25.2 % (ref 19.6–45.3)
MCH RBC QN AUTO: 26.8 PG (ref 26.6–33)
MCHC RBC AUTO-ENTMCNC: 29.9 G/DL (ref 31.5–35.7)
MCV RBC AUTO: 89.6 FL (ref 79–97)
METHGB BLD QL: 0.1 % (ref 0–3)
MODALITY: ABNORMAL
MONOCYTES # BLD AUTO: 0.71 10*3/MM3 (ref 0.1–0.9)
MONOCYTES NFR BLD AUTO: 9.6 % (ref 5–12)
NEUTROPHILS # BLD AUTO: 4.3 10*3/MM3 (ref 1.7–7)
NEUTROPHILS NFR BLD AUTO: 58 % (ref 42.7–76)
NT-PROBNP SERPL-MCNC: 159.3 PG/ML (ref 5–900)
OXYHGB MFR BLDV: 88.2 % (ref 85–100)
PCO2 BLDA: 31.1 MM HG (ref 35–45)
PH BLDA: 7.44 PH UNITS (ref 7.35–7.45)
PLATELET # BLD AUTO: 207 10*3/MM3 (ref 140–450)
PMV BLD AUTO: 10.4 FL (ref 6–12)
PO2 BLDA: 58.9 MM HG (ref 80–100)
POTASSIUM BLD-SCNC: 4.1 MMOL/L (ref 3.5–5.2)
RBC # BLD AUTO: 3.17 10*6/MM3 (ref 4.14–5.8)
SAO2 % BLDCOA: 89.5 % (ref 90–100)
SODIUM BLD-SCNC: 138 MMOL/L (ref 136–145)
WBC NRBC COR # BLD: 7.42 10*3/MM3 (ref 3.4–10.8)

## 2019-09-04 PROCEDURE — 83050 HGB METHEMOGLOBIN QUAN: CPT | Performed by: HOSPITALIST

## 2019-09-04 PROCEDURE — 94799 UNLISTED PULMONARY SVC/PX: CPT

## 2019-09-04 PROCEDURE — 86140 C-REACTIVE PROTEIN: CPT | Performed by: NURSE PRACTITIONER

## 2019-09-04 PROCEDURE — 82375 ASSAY CARBOXYHB QUANT: CPT | Performed by: HOSPITALIST

## 2019-09-04 PROCEDURE — 85025 COMPLETE CBC W/AUTO DIFF WBC: CPT | Performed by: INTERNAL MEDICINE

## 2019-09-04 PROCEDURE — 71045 X-RAY EXAM CHEST 1 VIEW: CPT

## 2019-09-04 PROCEDURE — 80048 BASIC METABOLIC PNL TOTAL CA: CPT | Performed by: INTERNAL MEDICINE

## 2019-09-04 PROCEDURE — 36600 WITHDRAWAL OF ARTERIAL BLOOD: CPT | Performed by: HOSPITALIST

## 2019-09-04 PROCEDURE — 99233 SBSQ HOSP IP/OBS HIGH 50: CPT | Performed by: INTERNAL MEDICINE

## 2019-09-04 PROCEDURE — 99232 SBSQ HOSP IP/OBS MODERATE 35: CPT | Performed by: INTERNAL MEDICINE

## 2019-09-04 PROCEDURE — 82805 BLOOD GASES W/O2 SATURATION: CPT | Performed by: HOSPITALIST

## 2019-09-04 PROCEDURE — 83880 ASSAY OF NATRIURETIC PEPTIDE: CPT | Performed by: INTERNAL MEDICINE

## 2019-09-04 PROCEDURE — 25010000002 CEFEPIME 2 G/NS 100 ML SOLUTION: Performed by: INTERNAL MEDICINE

## 2019-09-04 PROCEDURE — 82962 GLUCOSE BLOOD TEST: CPT

## 2019-09-04 RX ORDER — OXYCODONE HYDROCHLORIDE 5 MG/1
10 TABLET ORAL EVERY 6 HOURS SCHEDULED
Status: DISCONTINUED | OUTPATIENT
Start: 2019-09-05 | End: 2019-09-05 | Stop reason: HOSPADM

## 2019-09-04 RX ORDER — BISACODYL 10 MG
10 SUPPOSITORY, RECTAL RECTAL ONCE
Status: COMPLETED | OUTPATIENT
Start: 2019-09-04 | End: 2019-09-04

## 2019-09-04 RX ADMIN — ONDANSETRON 8 MG: 4 TABLET, FILM COATED ORAL at 12:11

## 2019-09-04 RX ADMIN — APIXABAN 5 MG: 5 TABLET, FILM COATED ORAL at 19:13

## 2019-09-04 RX ADMIN — BUDESONIDE 0.5 MG: 0.5 INHALANT RESPIRATORY (INHALATION) at 19:45

## 2019-09-04 RX ADMIN — IPRATROPIUM BROMIDE AND ALBUTEROL SULFATE 3 ML: .5; 3 SOLUTION RESPIRATORY (INHALATION) at 19:45

## 2019-09-04 RX ADMIN — SENNOSIDES, DOCUSATE SODIUM 2 TABLET: 50; 8.6 TABLET, FILM COATED ORAL at 19:13

## 2019-09-04 RX ADMIN — SODIUM CHLORIDE, PRESERVATIVE FREE 10 ML: 5 INJECTION INTRAVENOUS at 08:32

## 2019-09-04 RX ADMIN — MONTELUKAST SODIUM 10 MG: 10 TABLET, COATED ORAL at 19:13

## 2019-09-04 RX ADMIN — ALLOPURINOL 100 MG: 100 TABLET ORAL at 08:31

## 2019-09-04 RX ADMIN — IPRATROPIUM BROMIDE AND ALBUTEROL SULFATE 3 ML: .5; 3 SOLUTION RESPIRATORY (INHALATION) at 06:36

## 2019-09-04 RX ADMIN — APIXABAN 5 MG: 5 TABLET, FILM COATED ORAL at 08:31

## 2019-09-04 RX ADMIN — ONDANSETRON 8 MG: 4 TABLET, FILM COATED ORAL at 05:26

## 2019-09-04 RX ADMIN — OXYCODONE HYDROCHLORIDE 5 MG: 5 TABLET ORAL at 05:32

## 2019-09-04 RX ADMIN — MIDODRINE HYDROCHLORIDE 5 MG: 2.5 TABLET ORAL at 08:31

## 2019-09-04 RX ADMIN — POLYETHYLENE GLYCOL (3350) 17 G: 17 POWDER, FOR SOLUTION ORAL at 08:28

## 2019-09-04 RX ADMIN — MIDODRINE HYDROCHLORIDE 5 MG: 2.5 TABLET ORAL at 16:54

## 2019-09-04 RX ADMIN — FLUOXETINE 40 MG: 20 CAPSULE ORAL at 05:26

## 2019-09-04 RX ADMIN — ONDANSETRON 8 MG: 4 TABLET, FILM COATED ORAL at 16:54

## 2019-09-04 RX ADMIN — MIDODRINE HYDROCHLORIDE 5 MG: 2.5 TABLET ORAL at 12:11

## 2019-09-04 RX ADMIN — IPRATROPIUM BROMIDE AND ALBUTEROL SULFATE 3 ML: .5; 3 SOLUTION RESPIRATORY (INHALATION) at 13:44

## 2019-09-04 RX ADMIN — Medication 10 MG: at 19:13

## 2019-09-04 RX ADMIN — CHOLECALCIFEROL TAB 10 MCG (400 UNIT) 1000 UNITS: 10 TAB at 08:31

## 2019-09-04 RX ADMIN — CETIRIZINE HYDROCHLORIDE 5 MG: 10 TABLET, FILM COATED ORAL at 08:31

## 2019-09-04 RX ADMIN — OXYCODONE HYDROCHLORIDE 20 MG: 20 TABLET, FILM COATED, EXTENDED RELEASE ORAL at 08:28

## 2019-09-04 RX ADMIN — OXYCODONE HYDROCHLORIDE 5 MG: 5 TABLET ORAL at 12:11

## 2019-09-04 RX ADMIN — OXYCODONE HYDROCHLORIDE 5 MG: 5 TABLET ORAL at 17:22

## 2019-09-04 RX ADMIN — ONDANSETRON 8 MG: 4 TABLET, FILM COATED ORAL at 19:13

## 2019-09-04 RX ADMIN — CEFEPIME 2 G: 2 INJECTION, POWDER, FOR SOLUTION INTRAVENOUS at 12:11

## 2019-09-04 RX ADMIN — FLECAINIDE ACETATE 50 MG: 50 TABLET ORAL at 08:34

## 2019-09-04 RX ADMIN — FLECAINIDE ACETATE 50 MG: 50 TABLET ORAL at 19:12

## 2019-09-04 RX ADMIN — CEFEPIME 2 G: 2 INJECTION, POWDER, FOR SOLUTION INTRAVENOUS at 03:05

## 2019-09-04 RX ADMIN — BUDESONIDE 0.5 MG: 0.5 INHALANT RESPIRATORY (INHALATION) at 06:37

## 2019-09-04 RX ADMIN — PANTOPRAZOLE SODIUM 40 MG: 40 TABLET, DELAYED RELEASE ORAL at 05:26

## 2019-09-04 RX ADMIN — IPRATROPIUM BROMIDE AND ALBUTEROL SULFATE 3 ML: .5; 3 SOLUTION RESPIRATORY (INHALATION) at 03:03

## 2019-09-04 RX ADMIN — TAMSULOSIN HYDROCHLORIDE 0.4 MG: 0.4 CAPSULE ORAL at 08:31

## 2019-09-04 RX ADMIN — ALLOPURINOL 100 MG: 100 TABLET ORAL at 19:13

## 2019-09-04 NOTE — PROGRESS NOTES
"Palliative Care Progress Note    Date of Admission: 8/6/2019    Code Status:   Current Code Status     Date Active Code Status Order ID Comments User Context       8/6/2019 17:09 CPR [id number]  Aaron Rao MD ED       Questions for Current Code Status     Question Answer Comment    Code Status CPR     Medical Interventions (Level of Support Prior to Arrest) Full         Advance Directive: MOST form on file   Surrogate decision maker: WifeEmmy    Subjective:  Patient awake and talking with family some. All three children and wife at bedside.  They have been discussing options as far as Hospice.  Patient states he is having abdominal pain.  Rates an 8/10 aching pain.  Wife states he had a bad night.     Reviewed current scheduled and prn medications for route, type, dose, and frequency.     •  acetaminophen  •  acetaminophen  •  dextrose  •  dextrose  •  diphenhydrAMINE  •  glucagon (human recombinant)  •  heparin flush (porcine)  •  magnesium sulfate **OR** magnesium sulfate **OR** magnesium sulfate  •  Morphine  •  nitroglycerin  •  ondansetron  •  sodium chloride  •  sodium chloride    Objective:  PPS 30 /92 (BP Location: Left arm, Patient Position: Lying) Comment: reported vitals to RN Salome  Pulse 93   Temp 97.8 °F (36.6 °C) (Oral)   Resp 20   Ht 182.9 cm (72\")   Wt 73.5 kg (162 lb 1.6 oz)   SpO2 99%   BMI 21.98 kg/m²    Intake & Output (last day)       09/03 0701 - 09/04 0700 09/04 0701 - 09/05 0700    P.O. 160     Total Intake(mL/kg) 160 (2.2)     Urine (mL/kg/hr) 475 (0.3)     Stool      Total Output 475     Net -315           Urine Unmeasured Occurrence 2 x     Stool Unmeasured Occurrence 0 x         Lab Results (last 24 hours)     Procedure Component Value Units Date/Time    POC Glucose Once [[id number]]  (Normal) Collected:  09/04/19 1110    Specimen:  Blood Updated:  09/04/19 1117     Glucose 110 mg/dL     POC Glucose Once [[id number]]  (Abnormal) Collected:  09/04/19 0708    Specimen:  " Blood Updated:  09/04/19 0715     Glucose 141 mg/dL     Blood Gas, Arterial [[id number]]  (Abnormal) Collected:  09/04/19 0552    Specimen:  Arterial Blood Updated:  09/04/19 0607     Site Arterial: left radial     Dameon's Test Positive     pH, Arterial 7.441 pH units      pCO2, Arterial 31.1 mm Hg      pO2, Arterial 58.9 mm Hg      HCO3, Arterial 20.7 mmol/L      Base Excess, Arterial -2.7 mmol/L      O2 Saturation, Arterial 89.5 %      Hemoglobin, Blood Gas 10.1 g/dL      Hematocrit, Blood Gas 30.0 %      Oxyhemoglobin 88.2 %      Methemoglobin 0.10 %      Carboxyhemoglobin 1.4 %      A-a Gradiant 178.1 mmHg      Barometric Pressure for Blood Gas 729 mmHg      Modality Cannula - Nasal     FIO2 40 %      Flow Rate 5 lpm     C-reactive Protein [[id number]]  (Normal) Collected:  09/04/19 0328    Specimen:  Blood Updated:  09/04/19 0445     C-Reactive Protein 0.38 mg/dL     Basic Metabolic Panel [[id number]]  (Abnormal) Collected:  09/04/19 0328    Specimen:  Blood Updated:  09/04/19 0445     Glucose 135 mg/dL      BUN 19 mg/dL      Creatinine 1.24 mg/dL      Sodium 138 mmol/L      Potassium 4.1 mmol/L      Chloride 103 mmol/L      CO2 22.1 mmol/L      Calcium 10.1 mg/dL      eGFR Non African Amer 57 mL/min/1.73      BUN/Creatinine Ratio 15.3     Anion Gap 12.9 mmol/L     Narrative:       GFR Normal >60  Chronic Kidney Disease <60  Kidney Failure <15    BNP [[id number]]  (Normal) Collected:  09/04/19 0328    Specimen:  Blood Updated:  09/04/19 0443     proBNP 159.3 pg/mL     Narrative:       Among patients with dyspnea, NT-proBNP is highly sensitive for the detection of acute congestive heart failure. In addition NT-proBNP of <300 pg/ml effectively rules out acute congestive heart failure with 99% negative predictive value.    CBC & Differential [[id number]] Collected:  09/04/19 0328    Specimen:  Blood Updated:  09/04/19 0411    Narrative:       The following orders were created for panel order CBC &  Differential.  Procedure                               Abnormality         Status                     ---------                               -----------         ------                     CBC Auto Differential[[id number]]        Abnormal            Final result                 Please view results for these tests on the individual orders.    CBC Auto Differential [[id number]]  (Abnormal) Collected:  09/04/19 0328    Specimen:  Blood Updated:  09/04/19 0411     WBC 7.42 10*3/mm3      RBC 3.17 10*6/mm3      Hemoglobin 8.5 g/dL      Hematocrit 28.4 %      MCV 89.6 fL      MCH 26.8 pg      MCHC 29.9 g/dL      RDW 17.7 %      RDW-SD 55.9 fl      MPV 10.4 fL      Platelets 207 10*3/mm3      Neutrophil % 58.0 %      Lymphocyte % 25.2 %      Monocyte % 9.6 %      Eosinophil % 6.2 %      Basophil % 0.5 %      Immature Grans % 0.5 %      Neutrophils, Absolute 4.30 10*3/mm3      Lymphocytes, Absolute 1.87 10*3/mm3      Monocytes, Absolute 0.71 10*3/mm3      Eosinophils, Absolute 0.46 10*3/mm3      Basophils, Absolute 0.04 10*3/mm3      Immature Grans, Absolute 0.04 10*3/mm3     Blood Culture - Blood, Arm, Right [[id number]] Collected:  08/29/19 2022    Specimen:  Blood from Arm, Right Updated:  09/03/19 2040     Blood Culture No growth at 5 days    Blood Culture - Blood, Hand, Right [[id number]] Collected:  08/29/19 2023    Specimen:  Blood from Hand, Right Updated:  09/03/19 2040     Blood Culture No growth at 5 days    POC Glucose Once [[id number]]  (Normal) Collected:  09/03/19 2010    Specimen:  Blood Updated:  09/03/19 2017     Glucose 125 mg/dL     POC Glucose Once [[id number]]  (Normal) Collected:  09/03/19 1703    Specimen:  Blood Updated:  09/03/19 1709     Glucose 119 mg/dL     Cancer Antigen 19-9 [[id number]]  (Abnormal) Collected:  09/02/19 1634    Specimen:  Blood Updated:  09/03/19 1515     CA 19-9 134.3 U/mL         Imaging Results (last 24 hours)     Procedure Component Value Units Date/Time    XR Chest AP [[id number]]  Collected:  09/04/19 0634     Updated:  09/04/19 0636    Narrative:       CR Chest 1 Vw    INDICATION:   Respiratory failure and sepsis     COMPARISON:    8/31/2019    FINDINGS:  Single portable AP view(s) of the chest.    Support lines and tubes are unchanged.    The cardiomediastinal silhouette is stable. Coarse interstitial fibrotic changes bilaterally. No pleural fluid. No pneumothorax.       Impression:       Coarse interstitial fibrotic changes bilaterally which show modest improvement when compared to study of 8/31/2019.    Signer Name: Gerald Suazo MD   Signed: 9/4/2019 6:34 AM   Workstation Name: [id number]    [facility]          Physical Exam:  Gen: chronically ill appearing, pale  Skin: warm, dry  Eyes: FREDDIE, conjunctiva clear and moist, edema noted under bilateral eyes.   HEENT: oral cavity pale and moist, trachea midline, neck soft, no adenopathy.  Resp/thorax: even effort, non labored, diminished, shallow  CV: RRR   ABD: soft, bowel sounds hypoactive  Ext: no edema, no redness  Neuro: alert, interactive, weak  Psych: appropriate conversation and mood     Reviewed labs and diagnostic results.   Lab Results   Component Value Date    HGBA1C 7.20 (H) 08/06/2019     Results from last 7 days   Lab Units 09/04/19  0328   WBC 10*3/mm3 7.42   HEMOGLOBIN g/dL 8.5*   HEMATOCRIT % 28.4*   PLATELETS 10*3/mm3 207     Results from last 7 days   Lab Units 09/04/19  0328 09/03/19  0315   SODIUM mmol/L 138 138   POTASSIUM mmol/L 4.1 4.2   CHLORIDE mmol/L 103 104   CO2 mmol/L 22.1 24.2   BUN mg/dL 19 16   CREATININE mg/dL 1.24 1.12   CALCIUM mg/dL 10.1 9.7   BILIRUBIN mg/dL  --  0.3   ALK PHOS U/L  --  119*   ALT (SGPT) U/L  --  9   AST (SGOT) U/L  --  16   GLUCOSE mg/dL 135* 121*       Impression: 69 y.o. male metastatic pancreatic cancer     Plan:     1.  Goals of Care   - Call from Nursing this AM to speak with patient and family again regarding hospice.  They are well aware of hospice as  "an option.  All three children are in the medical field, including a APRN, RT, and counselor.  [facility] is the hospice that serves their area at home.  The family has spoken to [facility] and requested palliative care program to be initiated at discharge from hospital until after PET scan on Friday and then they will make the decision on transitioning from palliative to Hospice if they feel that is more appropriate.  They feel patient has fought so hard and that he wants to know the result of the PET scan before making any decision regarding hospice.  Patient is agreeable that this is his wishes.  Family states they have hospital bed at home, would need home oxygen and portable oxygen, and they would like EMS transportation to the PET scan.  I have relayed these things to the team of medical working with patient, including Salome the RN today, Mercedes the SS, and Dr. Rao.      2.  Abdominal pain   - Patient was well controlled on his oral regimen of oxycodone 10mg every 6 hours scheduled along with oxycontin 20mg BID.  I would suggest starting this back, as patient complains of his pain being an \"8/10\"  Wife reports he had a bad night with pain.  And that he has vomited because of his pain.  She understands that meds were cut back by primary for sedation so patient can be apart of hospice and discharge talks.  Marinol was stopped.  Likely sedating med.  I would recommend oral regimen to resume as stated above.  And I would recommend this for his discharge meds.      3.  Anxiety/fear   - Lots of support for family and patient.  This is mentally and physically a rough time and the patient is fearful and anxious about what the next steps are.      4.  Hypoxia   - Requesting O2 at home as here in hospital.  I have relayed to case management the need for home and portable O2 per family wishes.     5.  Weakness/fatigue   - Patient continues to decline physically.  He is weak and appears weak. Discussed care " for home and family is confident that they can care for patient.  They have all the equipment they need currently.      We appreciate the opportunity to assist in GOC discussions and will be here for family.     Time: 30 minutes   > 50% time spent in counseling and education concerning current orders for symptom management with patient, family, Salome- ROHIT, Mercedes JOHNSON, Dr. Maira Ignacio, APRN  [phone]  09/04/19  2:01 PM

## 2019-09-04 NOTE — PLAN OF CARE
Problem: Pain, Chronic (Adult)  Goal: Acceptable Pain/Comfort Level and Functional Ability  Outcome: Ongoing (interventions implemented as appropriate)   09/04/19 1010   Pain, Chronic (Adult)   Acceptable Pain/Comfort Level and Functional Ability making progress toward outcome       Problem: Fall Risk (Adult)  Goal: Absence of Fall  Outcome: Ongoing (interventions implemented as appropriate)   09/04/19 1010   Fall Risk (Adult)   Absence of Fall making progress toward outcome       Problem: Skin Injury Risk (Adult)  Goal: Identify Related Risk Factors and Signs and Symptoms  Outcome: Ongoing (interventions implemented as appropriate)   08/25/19 1625   Skin Injury Risk (Adult)   Related Risk Factors (Skin Injury Risk) mobility impaired     Goal: Skin Health and Integrity  Outcome: Ongoing (interventions implemented as appropriate)   09/04/19 1010   Skin Injury Risk (Adult)   Skin Health and Integrity making progress toward outcome       Problem: Mobility, Physical Impaired (Adult)  Goal: Enhanced Mobility Skills  Outcome: Ongoing (interventions implemented as appropriate)   09/04/19 1010   Mobility, Physical Impaired (Adult)   Enhanced Mobility Skills making progress toward outcome     Goal: Enhanced Functional Ability  Outcome: Ongoing (interventions implemented as appropriate)   09/04/19 1010   Mobility, Physical Impaired (Adult)   Enhanced Functional Ability making progress toward outcome

## 2019-09-04 NOTE — PROGRESS NOTES
Assisted By: Salome BEE and multiple family members    CC: Follow-up on metastatic pancreas cancer with    Interview History/HPI: Patient apparently had some event through the night where he was increasing hypoxic and had increased pain.  He also has constipation, he had refuses lactulose but son would like him to have a suppository.  This morning he states he really did not eat much, he is more awake states he does not believe very good he is having some pain in his abdominal area.  No emesis but continues to be nauseated.      Vitals:    09/04/19 1523   BP: 137/89   Pulse: 99   Resp: 20   Temp: 97.8 °F (36.6 °C)   SpO2: 98%       Intake/Output Summary (Last 24 hours) at 9/4/2019 1744  Last data filed at 9/4/2019 1523  Gross per 24 hour   Intake 280 ml   Output 300 ml   Net -20 ml       EXAM: He is now back on 2 L with adequate saturations.  He appears chronically ill but in no acute distress his lungs have bilateral breath sounds diminished bases faint crackle at the left base laterally heart regular rate and rhythm without murmur rub or gallop, abdomen is soft benign but diffuse tenderness occasional bowel sound heard no significant edema.    Tele: Sinus, reviewed    LABS:   Lab Results (last 48 hours)     Procedure Component Value Units Date/Time    POC Glucose Once [[id number]]  (Abnormal) Collected:  09/04/19 1613    Specimen:  Blood Updated:  09/04/19 1619     Glucose 134 mg/dL     POC Glucose Once [[id number]]  (Normal) Collected:  09/04/19 1110    Specimen:  Blood Updated:  09/04/19 1117     Glucose 110 mg/dL     POC Glucose Once [[id number]]  (Abnormal) Collected:  09/04/19 0708    Specimen:  Blood Updated:  09/04/19 0715     Glucose 141 mg/dL     Blood Gas, Arterial [[id number]]  (Abnormal) Collected:  09/04/19 0552    Specimen:  Arterial Blood Updated:  09/04/19 0607     Site Arterial: left radial     Dameon's Test Positive     pH, Arterial 7.441 pH units      pCO2, Arterial 31.1 mm Hg      pO2, Arterial  58.9 mm Hg      HCO3, Arterial 20.7 mmol/L      Base Excess, Arterial -2.7 mmol/L      O2 Saturation, Arterial 89.5 %      Hemoglobin, Blood Gas 10.1 g/dL      Hematocrit, Blood Gas 30.0 %      Oxyhemoglobin 88.2 %      Methemoglobin 0.10 %      Carboxyhemoglobin 1.4 %      A-a Gradiant 178.1 mmHg      Barometric Pressure for Blood Gas 729 mmHg      Modality Cannula - Nasal     FIO2 40 %      Flow Rate 5 lpm     C-reactive Protein [[id number]]  (Normal) Collected:  09/04/19 0328    Specimen:  Blood Updated:  09/04/19 0445     C-Reactive Protein 0.38 mg/dL     Basic Metabolic Panel [[id number]]  (Abnormal) Collected:  09/04/19 0328    Specimen:  Blood Updated:  09/04/19 0445     Glucose 135 mg/dL      BUN 19 mg/dL      Creatinine 1.24 mg/dL      Sodium 138 mmol/L      Potassium 4.1 mmol/L      Chloride 103 mmol/L      CO2 22.1 mmol/L      Calcium 10.1 mg/dL      eGFR Non African Amer 57 mL/min/1.73      BUN/Creatinine Ratio 15.3     Anion Gap 12.9 mmol/L     Narrative:       GFR Normal >60  Chronic Kidney Disease <60  Kidney Failure <15    BNP [[id number]]  (Normal) Collected:  09/04/19 0328    Specimen:  Blood Updated:  09/04/19 0443     proBNP 159.3 pg/mL     Narrative:       Among patients with dyspnea, NT-proBNP is highly sensitive for the detection of acute congestive heart failure. In addition NT-proBNP of <300 pg/ml effectively rules out acute congestive heart failure with 99% negative predictive value.    CBC & Differential [[id number]] Collected:  09/04/19 0328    Specimen:  Blood Updated:  09/04/19 0411    Narrative:       The following orders were created for panel order CBC & Differential.  Procedure                               Abnormality         Status                     ---------                               -----------         ------                     CBC Auto Differential[[id number]]        Abnormal            Final result                 Please view results for these tests on the individual orders.     CBC Auto Differential [[id number]]  (Abnormal) Collected:  09/04/19 0328    Specimen:  Blood Updated:  09/04/19 0411     WBC 7.42 10*3/mm3      RBC 3.17 10*6/mm3      Hemoglobin 8.5 g/dL      Hematocrit 28.4 %      MCV 89.6 fL      MCH 26.8 pg      MCHC 29.9 g/dL      RDW 17.7 %      RDW-SD 55.9 fl      MPV 10.4 fL      Platelets 207 10*3/mm3      Neutrophil % 58.0 %      Lymphocyte % 25.2 %      Monocyte % 9.6 %      Eosinophil % 6.2 %      Basophil % 0.5 %      Immature Grans % 0.5 %      Neutrophils, Absolute 4.30 10*3/mm3      Lymphocytes, Absolute 1.87 10*3/mm3      Monocytes, Absolute 0.71 10*3/mm3      Eosinophils, Absolute 0.46 10*3/mm3      Basophils, Absolute 0.04 10*3/mm3      Immature Grans, Absolute 0.04 10*3/mm3     Blood Culture - Blood, Arm, Right [[id number]] Collected:  08/29/19 2022    Specimen:  Blood from Arm, Right Updated:  09/03/19 2040     Blood Culture No growth at 5 days    Blood Culture - Blood, Hand, Right [[id number]] Collected:  08/29/19 2023    Specimen:  Blood from Hand, Right Updated:  09/03/19 2040     Blood Culture No growth at 5 days    POC Glucose Once [[id number]]  (Normal) Collected:  09/03/19 2010    Specimen:  Blood Updated:  09/03/19 2017     Glucose 125 mg/dL     POC Glucose Once [[id number]]  (Normal) Collected:  09/03/19 1703    Specimen:  Blood Updated:  09/03/19 1709     Glucose 119 mg/dL     Cancer Antigen 19-9 [[id number]]  (Abnormal) Collected:  09/02/19 1634    Specimen:  Blood Updated:  09/03/19 1515     CA 19-9 134.3 U/mL     POC Glucose Once [[id number]]  (Normal) Collected:  09/03/19 1223    Specimen:  Blood Updated:  09/03/19 1230     Glucose 124 mg/dL     POC Glucose Once [[id number]]  (Normal) Collected:  09/03/19 0700    Specimen:  Blood Updated:  09/03/19 0707     Glucose 118 mg/dL     C-reactive Protein [[id number]]  (Normal) Collected:  09/03/19 0315    Specimen:  Blood Updated:  09/03/19 0432     C-Reactive Protein 0.44 mg/dL     Comprehensive Metabolic Panel  [[id number]]  (Abnormal) Collected:  09/03/19 0315    Specimen:  Blood Updated:  09/03/19 0432     Glucose 121 mg/dL      BUN 16 mg/dL      Creatinine 1.12 mg/dL      Sodium 138 mmol/L      Potassium 4.2 mmol/L      Chloride 104 mmol/L      CO2 24.2 mmol/L      Calcium 9.7 mg/dL      Total Protein 6.8 g/dL      Albumin 3.13 g/dL      ALT (SGPT) 9 U/L      AST (SGOT) 16 U/L      Alkaline Phosphatase 119 U/L      Total Bilirubin 0.3 mg/dL      eGFR Non African Amer 65 mL/min/1.73      Globulin 3.7 gm/dL      A/G Ratio 0.9 g/dL      BUN/Creatinine Ratio 14.3     Anion Gap 9.8 mmol/L     Narrative:       GFR Normal >60  Chronic Kidney Disease <60  Kidney Failure <15    CBC & Differential [[id number]] Collected:  09/03/19 0315    Specimen:  Blood Updated:  09/03/19 0405    Narrative:       The following orders were created for panel order CBC & Differential.  Procedure                               Abnormality         Status                     ---------                               -----------         ------                     CBC Auto Differential[[id number]]        Abnormal            Final result                 Please view results for these tests on the individual orders.    CBC Auto Differential [[id number]]  (Abnormal) Collected:  09/03/19 0315    Specimen:  Blood Updated:  09/03/19 0405     WBC 7.57 10*3/mm3      RBC 2.99 10*6/mm3      Hemoglobin 7.9 g/dL      Hematocrit 27.2 %      MCV 91.0 fL      MCH 26.4 pg      MCHC 29.0 g/dL      RDW 17.9 %      RDW-SD 57.1 fl      MPV 9.8 fL      Platelets 180 10*3/mm3      Neutrophil % 65.7 %      Lymphocyte % 17.8 %      Monocyte % 9.9 %      Eosinophil % 5.0 %      Basophil % 0.8 %      Immature Grans % 0.8 %      Neutrophils, Absolute 4.97 10*3/mm3      Lymphocytes, Absolute 1.35 10*3/mm3      Monocytes, Absolute 0.75 10*3/mm3      Eosinophils, Absolute 0.38 10*3/mm3      Basophils, Absolute 0.06 10*3/mm3      Immature Grans, Absolute 0.06 10*3/mm3     POC Glucose Once  [[id number]]  (Normal) Collected:  09/02/19 1959    Specimen:  Blood Updated:  09/02/19 2005     Glucose 128 mg/dL           Radiology:  Imaging Results (last 72 hours)     Procedure Component Value Units Date/Time    XR Chest AP [[id number]] Collected:  09/04/19 0634     Updated:  09/04/19 0636    Narrative:       CR Chest 1 Vw    INDICATION:   Respiratory failure and sepsis     COMPARISON:    8/31/2019    FINDINGS:  Single portable AP view(s) of the chest.    Support lines and tubes are unchanged.    The cardiomediastinal silhouette is stable. Coarse interstitial fibrotic changes bilaterally. No pleural fluid. No pneumothorax.       Impression:       Coarse interstitial fibrotic changes bilaterally which show modest improvement when compared to study of 8/31/2019.    Signer Name: Gerald Suazo MD   Signed: 9/4/2019 6:34 AM   Workstation Name: [id number]    [facility]    CT Head Without Contrast [[id number]] Collected:  09/02/19 1014     Updated:  09/02/19 1016    Narrative:       CT Head WO    HISTORY:   69-year-old male inpatient with a history of syncopal episode and fainting. Sepsis. Hypomagnesemia. Hyponatremia.    TECHNIQUE:   Axial unenhanced head CT. Radiation dose reduction techniques included automated exposure control or exposure modulation based on body size. Count of known CT and cardiac nuc med studies performed in previous 12 months: 28.     Time of scan: 0942 hours    COMPARISON:   8/13/2019    FINDINGS:   No intracranial hemorrhage, mass, or infarct. No hydrocephalus or extra-axial fluid collection. There are senescent changes, including volume loss and nonspecific white matter change, but no acute abnormality is seen. The skull base, calvarium, and  extracranial soft tissues are normal.      Impression:       Senescent changes without acute abnormality. No significant change..          Signer Name: Henri Holguin MD   Signed: 9/2/2019 10:14 AM   Workstation Name:  [email or web address]    [facility]            Results for orders placed during the hospital encounter of 08/06/19   Adult Transthoracic Echo Complete W/ Cont if Necessary Per Protocol    Narrative · Technically limited and difficult study due to poor echo window. Only   apical and subcostal views are obtained...  · Left ventricular systolic function is normal.  · Unable to estimate LVEF and to evaluate wall motion as endocardial   border is not distinctly seen.  · Left ventricular diastolic dysfunction.  · Normal cardiac chamber dimensions.  · The aortic valve is not well visualized.  · The mitral valve is normal in structure. No mitral valve regurgitation   is present. No significant mitral valve stenosis is present.  · The tricuspid valve is normal. No evidence of tricuspid valve stenosis   is present. No tricuspid valve regurgitation is present.  · The pulmonic valve is not well visualized.  · There is no evidence of pericardial effusion.        Chest x-ray image reviewed, still diffuse interstitial disease    Discussed with Dr Alves    Assessment/Plan:   Metastatic pancreas cancer, patient is more awake, he would like to go home but they would like to see palliative care team and Dr. Alves, I have again explained that there is a little else to offer medically, the patient himself seems to want to get the CAT scan/PET scan on Friday that is been scheduled as an outpatient.  Patient desaturates with minimal movement secondary to this interstitial disease and I am not hopeful that the patient will be able to get to the PET scan however they would like to try, oncology discussed with him as well and they told them the same.  This being the case we will plan to discharge tomorrow with palliative care.  He will need supplemental oxygen.  At their request his oxycodone was increased again to 10 mg.  I have given a Dulcolax suppository, prognosis remains poor.  For the interstitial disease we have  tried multiple rounds of antibiotics here we have attempted diuresis.  His CA 19-9 is rising, I am very suspicious of lymphangitic spread.  His CRP is low and his proBNP remains normal as well.  I do not think he will get strong enough for more chemotherapy and I am in agreement with palliative care, I am also recommending hospice but they are considering this.    History of atrial fibrillation, maintaining sinus on Tambocor, on Eliquis for stroke prevention    Hypotension, improved with midodrine

## 2019-09-04 NOTE — PROGRESS NOTES
Discharge Planning Assessment  [facility]     Patient Name: Todd Phillips  MRN: [id number]  Today's Date: 9/4/2019    Admit Date: 8/6/2019      Discharge Plan     Row Name 09/04/19 1415       Plan    Plan SS spoke to pt's spouse and son on this date. Pt's spouse and son want to discuss Palliative Care services with [facility]. SS contacted [facility] [phone] per Iris and provided contact information for pt's spouse. SS received call from admission nurse, Troy who states pt's family is requesting pt to have scheduled PET scan on Friday, 9/6. [facility] Palliative care agreeable to admit pt, however will be unable to admit for one week to 10 days. [facility] Palliative Care will follow pt at home with home health services. SS spoke to Palliative Care per Mónica and April who states pt's family is requesting a statement from the  stating it is medically necessary for him to be transported via EMS to PET scan appointment. SS notified Dr. Rao and he states plans to discuss this with Dr. Comer. SS spoke to pt's spouse and son who states wanting home oxygen and portable oxygen from [facility] ([phone]). Pt's spouse and son are agreeable to home health services and [facility] Palliative Care. Pt's spouse and son are aware that [facility] Palliative Care will not be able to admit pt for one week to 10 days. SS to follow.         MARTIN Cronin

## 2019-09-04 NOTE — PROGRESS NOTES
Date:  09/04/19    CC: Shortness of breath    Subjective:  Todd Phillips is seen this afternoon in follow-up of pancreatic cancer and shortness of breath.  Unfortunately despite aggressive therapy with antimicrobials (including antifungal therapy), diuretics (which have recently induced hypotension) and aggressive nutritional and supportive care, Mr. Phillips's condition has continued to decline.  His pulmonary infiltrates are unchanged.  CRP is low.  No evidence of fluid overload.  He has some shortness of breath at rest but desaturates with minimal activity.  Further antibiotics / diuretics have not helped this.  He and has family have considered palliative care and hospice care.  He has decided that he is interested in palliative care at home but he doesn't want to enroll in hospice because it is very important to him to have PET-CT done.  We discussed this further.  I explained that the PET probably wouldn't change anything that we do because he is too weak to receive further chemotherapy whether it shows disease progression or even regression.   I explained that I thought it would be difficult for him to get to the test to do this given how short of breath he gets with minimal activity. Nonetheless, Mr. Phillips says he feels strongly that he wants to try to do this because it is important to him.  He complains of constipation and asks for a suppository which helps him at home.  Miralax and Lactulose haven't provided results (though he declined Lactulose because of nausea this AM).  His son was concerned about fecal impaction but the patient declines enema saying suppository has always helped him in the past.         Objective:  Medications:  Scheduled Meds:    allopurinol 100 mg Oral BID   apixaban 5 mg Oral Q12H   bisacodyl 10 mg Rectal Once   budesonide 0.5 mg Nebulization BID - RT   cetirizine 5 mg Oral Daily   cholecalciferol 1,000 Units Oral Daily   flecainide 50 mg Oral Q12H   FLUoxetine 40 mg Oral QAM    insulin aspart 0-7 Units Subcutaneous 4x Daily AC & at Bedtime   ipratropium-albuterol 3 mL Nebulization Q6H - RT   lactulose 20 g Oral Daily   lactulose 30 g Oral Once   midodrine 5 mg Oral TID AC   montelukast 10 mg Oral Nightly   ondansetron 8 mg Oral TID AC   [START ON 9/5/2019] oxyCODONE 10 mg Oral Q6H   oxyCODONE 20 mg Oral Q12H   pantoprazole 40 mg Oral QAM   polyethylene glycol 17 g Oral Daily   senna-docusate 2 tablet Oral Nightly   sodium chloride 10 mL Intravenous Q12H   tamsulosin 0.4 mg Oral Daily     Continuous Infusions:   PRN Meds:.•  acetaminophen  •  acetaminophen  •  dextrose  •  dextrose  •  diphenhydrAMINE  •  glucagon (human recombinant)  •  heparin flush (porcine)  •  magnesium sulfate **OR** magnesium sulfate **OR** magnesium sulfate  •  Morphine  •  nitroglycerin  •  ondansetron  •  sodium chloride  •  sodium chloride    Physical Exam:  Vital Signs     Patient Vitals for the past 24 hrs:   BP Temp Temp src Pulse Resp SpO2   09/04/19 1523 137/89 97.8 °F (36.6 °C) Oral 99 20 98 %   09/04/19 1351 -- -- -- 93 20 99 %   09/04/19 1344 -- -- -- 94 20 98 %   09/04/19 1135 132/92 -- -- 107 24 91 %   09/04/19 0646 -- -- -- 101 20 --   09/04/19 0636 -- -- -- 105 22 96 %   09/04/19 0622 129/85 97.8 °F (36.6 °C) Oral 102 20 95 %   09/04/19 0559 -- -- -- -- -- 94 %   09/04/19 0555 -- -- -- -- -- 93 %   09/04/19 0543 -- -- -- -- -- (!) 86 %   09/04/19 0310 -- -- -- 99 24 94 %   09/04/19 0303 -- -- -- 109 24 (!) 84 %   09/04/19 0300 125/83 98.3 °F (36.8 °C) Oral 87 20 --   09/04/19 0057 -- -- -- -- -- 97 %   09/03/19 2300 118/75 97.8 °F (36.6 °C) Oral 81 20 --   09/03/19 1929 -- -- -- 88 20 99 %   09/03/19 1914 -- -- -- 85 20 97 %       General:  Awake, alert and oriented, appears unwell, fatigued  HEENT:  Pupils are equal, round and reactive to light and accommodation, Extra-ocular movements full, oropharynx clear   Neck:  No JVD, thyromegaly or lymphadenopathy  CV:  Regular rate and rhythm, no murmurs,  rubs or gallops  Resp:  Breath sounds are somewhat diminished but generally clear with maybe a few fine crackles at the L>R bases.   No rhonchi or wheezes  Abd:  Soft, non-specific diffuse abdominal tenderness, non-distended, bowel sounds present, no organomegaly  Ext:  No clubbing, cyanosis or edema  Lymph:  No cervical, supraclavicular, axillary, inguinal or femoral adenopathy  Neuro:  MS as above, CN II-XII intact, grossly non-focal exam    Labs / Studies:    Lab Results   Component Value Date    WBC 7.42 09/04/2019    HGB 8.5 (L) 09/04/2019    HCT 28.4 (L) 09/04/2019    MCV 89.6 09/04/2019    RDW 17.7 (H) 09/04/2019     09/04/2019    NEUTRORELPCT 58.0 09/04/2019    LYMPHORELPCT 25.2 09/04/2019    MONORELPCT 9.6 09/04/2019    EOSRELPCT 6.2 09/04/2019    BASORELPCT 0.5 09/04/2019    NEUTROABS 4.30 09/04/2019    LYMPHSABS 1.87 09/04/2019       Lab Results   Component Value Date     09/04/2019    K 4.1 09/04/2019    CO2 22.1 09/04/2019     09/04/2019    BUN 19 09/04/2019    CREATININE 1.24 09/04/2019    EGFRIFNONA 57 (L) 09/04/2019    EGFRIFAFRI  09/02/2016      Comment:      <15 Indicative of kidney failure.    GLUCOSE 135 (H) 09/04/2019    CALCIUM 10.1 09/04/2019    ALKPHOS 119 (H) 09/03/2019    AST 16 09/03/2019    ALT 9 09/03/2019    BILITOT 0.3 09/03/2019    ALBUMIN 3.13 (L) 09/03/2019    PROTEINTOT 6.8 09/03/2019    MG 2.7 (H) 08/27/2019    PHOS 3.8 08/26/2019     Lab Results   Component Value Date    CRP 0.38 09/04/2019    CRP 0.44 09/03/2019    CRP 0.76 (H) 09/02/2019    CRP 0.88 (H) 09/01/2019    CRP 1.31 (H) 08/31/2019    CRP 2.23 (H) 08/30/2019    CRP 1.88 (H) 08/29/2019    CRP 0.47 08/28/2019    CRP 0.63 (H) 08/27/2019    CRP 0.77 (H) 08/26/2019    CRP 1.24 (H) 08/25/2019    CRP 1.69 (H) 08/24/2019    CRP 1.47 (H) 08/23/2019    CRP 1.98 (H) 08/22/2019    CRP 1.68 (H) 08/21/2019       Lab Results   Component Value Date     134.3 (H) 09/02/2019     119.6 (H) 08/20/2019      82.9 (H) 07/30/2019     82.0 (H) 06/24/2019     76.1 (H) 04/23/2019     129.6 (H) 03/20/2019     85 (H) 02/20/2019     34 01/22/2019     104 (H) 01/07/2019     1349 (H) 12/19/2018     1089 (H) 12/10/2018     1576 (H) 11/13/2018     5149 (H) 10/19/2018     7602 (H) 09/25/2018     3636 (H) 09/07/2018     CT Chest 8-27-19  LUNGS: BILATERAL LOWER LOBE AND MINIMAL UPPER LOBE AIRSPACE DISEASE  CONSISTENT WITH PNEUMONIA.     HEART: Unremarkable.     PERICARDIUM: No effusion.     MEDIASTINUM: MEDIASTINAL AIR FROM UNCERTAIN ORIGIN.     PLEURA: No pleural effusion. No pleural mass or abnormal calcification.     MAJOR AIRWAYS: Clear. No intrinsic mass.     VASCULATURE: No evidence of aneurysm.     VISUALIZED UPPER ABDOMEN:        LIVER: Homogeneous. No focal hepatic mass or ductal dilatation.        SPLEEN: Homogeneous. No splenomegaly.        ADRENALS: No mass.        KIDNEYS: No mass. No obstructive uropathy.  No evidence of  urolithiasis.        GI TRACT: Non-dilated. No definite wall thickening.        PERITONEUM: No free air. No free fluid or loculated fluid  collections.           ABDOMINAL WALL: No focal hernia or mass.           OTHER: None.     BONES: No acute bony abnormality.     IMPRESSION:  Impression:  Mediastinal air from uncertain origin. Bilateral lower lobe and minimal  upper lobe airspace disease consistent with pneumonia.    CXR 9-4-19: Coarse interstitial fibrotic changes bilaterally which show modest improvement when compared to study of 8/31/2019.    CTChest 9-2-19  1. Pneumomediastinum, unchanged from the prior examination 8/27/2019.  2. No interval improvement in patchy basilar predominant infiltrates throughout both lungs. Cannot exclude an underlying component of interstitial lung disease/fibrosis. Follow-up to resolution recommended.    Assessment & Plan:  Todd Phillips is a 69 y.o. year-old male with now metastatic  pancreatic cancer  admitted with pneumonia now with worsening shortness of breath, increased pulmonary congestion, anorexia, hypoalbuminemia.    1.  Metastatic pancreatic cancer:  -  I fear that this is the underlying problem that is driving Mr. Phlilips's problems.  -  His  level is increasing despite his treatment which would support this idea.  -  Despite very aggressive inpatient care treating infection, fluid overload, hypoalbuminemia, poor nutrition, etc, his condition has still continued to decline.    -  Given his overall declining condition, I have recommended they consider taking him home with home hospice. We discussed this at length with he and his wife and children previously and did so again today.  He would prefer to go home with palliative care (which his son has discussed with [facility]).  He would like to get PET-CT as originally planned prior to his hospitalization next Friday and will then consider hospice transition.       2.  Constipation:  Persists despite Miralax and Lactulose (with variable compliance with the latter).  He is passing gas and has had reduced po intake.  He would like to try a suppository which has helped him in the past.    3.  Hypoalbuminemia with poor appetite:  Marinol discontinued because of significant altered mental status.  He is eating as best he can.    4.  Prognosis:  Mr. Phillips and his family understand that his prognosis is poor.  I had a long converstation with he, his wife, his 2 sons and his daughter.  I answered all their questions to the best of my ability and they were satisfied with our plan.      Discussed with Dr. Rao.    Appreciate very much the hospitalist team and care of this patient.  We will continue to follow with you.  Please do not hesitate to call with any questions or concerns.    Gwen Alves MD  09/04/19  7:03 PM

## 2019-09-05 VITALS
OXYGEN SATURATION: 96 % | HEART RATE: 99 BPM | WEIGHT: 162.1 LBS | RESPIRATION RATE: 17 BRPM | TEMPERATURE: 97.9 F | BODY MASS INDEX: 21.96 KG/M2 | HEIGHT: 72 IN | SYSTOLIC BLOOD PRESSURE: 127 MMHG | DIASTOLIC BLOOD PRESSURE: 82 MMHG

## 2019-09-05 LAB
ANION GAP SERPL CALCULATED.3IONS-SCNC: 10.7 MMOL/L (ref 5–15)
BASOPHILS # BLD AUTO: 0.04 10*3/MM3 (ref 0–0.2)
BASOPHILS NFR BLD AUTO: 0.5 % (ref 0–1.5)
BUN BLD-MCNC: 19 MG/DL (ref 8–23)
BUN/CREAT SERPL: 15.8 (ref 7–25)
CALCIUM SPEC-SCNC: 10.1 MG/DL (ref 8.6–10.5)
CHLORIDE SERPL-SCNC: 101 MMOL/L (ref 98–107)
CO2 SERPL-SCNC: 24.3 MMOL/L (ref 22–29)
CREAT BLD-MCNC: 1.2 MG/DL (ref 0.76–1.27)
CRP SERPL-MCNC: 0.66 MG/DL (ref 0–0.5)
DEPRECATED RDW RBC AUTO: 55.2 FL (ref 37–54)
EOSINOPHIL # BLD AUTO: 0.19 10*3/MM3 (ref 0–0.4)
EOSINOPHIL NFR BLD AUTO: 2.3 % (ref 0.3–6.2)
ERYTHROCYTE [DISTWIDTH] IN BLOOD BY AUTOMATED COUNT: 17.5 % (ref 12.3–15.4)
GFR SERPL CREATININE-BSD FRML MDRD: 60 ML/MIN/1.73
GLUCOSE BLD-MCNC: 130 MG/DL (ref 65–99)
GLUCOSE BLDC GLUCOMTR-MCNC: 121 MG/DL (ref 70–130)
GLUCOSE BLDC GLUCOMTR-MCNC: 122 MG/DL (ref 70–130)
HCT VFR BLD AUTO: 28.7 % (ref 37.5–51)
HGB BLD-MCNC: 8.7 G/DL (ref 13–17.7)
IMM GRANULOCYTES # BLD AUTO: 0.05 10*3/MM3 (ref 0–0.05)
IMM GRANULOCYTES NFR BLD AUTO: 0.6 % (ref 0–0.5)
LYMPHOCYTES # BLD AUTO: 1.17 10*3/MM3 (ref 0.7–3.1)
LYMPHOCYTES NFR BLD AUTO: 14.1 % (ref 19.6–45.3)
MCH RBC QN AUTO: 26.9 PG (ref 26.6–33)
MCHC RBC AUTO-ENTMCNC: 30.3 G/DL (ref 31.5–35.7)
MCV RBC AUTO: 88.9 FL (ref 79–97)
MONOCYTES # BLD AUTO: 0.71 10*3/MM3 (ref 0.1–0.9)
MONOCYTES NFR BLD AUTO: 8.5 % (ref 5–12)
NEUTROPHILS # BLD AUTO: 6.16 10*3/MM3 (ref 1.7–7)
NEUTROPHILS NFR BLD AUTO: 74 % (ref 42.7–76)
PLATELET # BLD AUTO: 212 10*3/MM3 (ref 140–450)
PMV BLD AUTO: 10.8 FL (ref 6–12)
POTASSIUM BLD-SCNC: 4.2 MMOL/L (ref 3.5–5.2)
RBC # BLD AUTO: 3.23 10*6/MM3 (ref 4.14–5.8)
SODIUM BLD-SCNC: 136 MMOL/L (ref 136–145)
WBC NRBC COR # BLD: 8.32 10*3/MM3 (ref 3.4–10.8)

## 2019-09-05 PROCEDURE — 94799 UNLISTED PULMONARY SVC/PX: CPT

## 2019-09-05 PROCEDURE — 97530 THERAPEUTIC ACTIVITIES: CPT

## 2019-09-05 PROCEDURE — 99239 HOSP IP/OBS DSCHRG MGMT >30: CPT | Performed by: INTERNAL MEDICINE

## 2019-09-05 PROCEDURE — 82962 GLUCOSE BLOOD TEST: CPT

## 2019-09-05 PROCEDURE — 80048 BASIC METABOLIC PNL TOTAL CA: CPT | Performed by: INTERNAL MEDICINE

## 2019-09-05 PROCEDURE — 85025 COMPLETE CBC W/AUTO DIFF WBC: CPT | Performed by: INTERNAL MEDICINE

## 2019-09-05 PROCEDURE — 86140 C-REACTIVE PROTEIN: CPT | Performed by: NURSE PRACTITIONER

## 2019-09-05 RX ORDER — LORATADINE 10 MG/1
10 TABLET ORAL DAILY
Start: 2019-09-05

## 2019-09-05 RX ORDER — TAMSULOSIN HYDROCHLORIDE 0.4 MG/1
0.4 CAPSULE ORAL DAILY
Qty: 30 CAPSULE | Refills: 0 | Status: SHIPPED | OUTPATIENT
Start: 2019-09-06

## 2019-09-05 RX ORDER — MIDODRINE HYDROCHLORIDE 10 MG/1
5 TABLET ORAL
Qty: 45 TABLET | Refills: 0 | Status: SHIPPED | OUTPATIENT
Start: 2019-09-05

## 2019-09-05 RX ADMIN — IPRATROPIUM BROMIDE AND ALBUTEROL SULFATE 3 ML: .5; 3 SOLUTION RESPIRATORY (INHALATION) at 07:17

## 2019-09-05 RX ADMIN — ONDANSETRON 8 MG: 4 TABLET, FILM COATED ORAL at 16:53

## 2019-09-05 RX ADMIN — ONDANSETRON 8 MG: 4 TABLET, FILM COATED ORAL at 10:07

## 2019-09-05 RX ADMIN — FLECAINIDE ACETATE 50 MG: 50 TABLET ORAL at 08:53

## 2019-09-05 RX ADMIN — ALLOPURINOL 100 MG: 100 TABLET ORAL at 08:53

## 2019-09-05 RX ADMIN — OXYCODONE HYDROCHLORIDE 20 MG: 20 TABLET, FILM COATED, EXTENDED RELEASE ORAL at 08:53

## 2019-09-05 RX ADMIN — OXYCODONE HYDROCHLORIDE 10 MG: 5 TABLET ORAL at 04:57

## 2019-09-05 RX ADMIN — BUDESONIDE 0.5 MG: 0.5 INHALANT RESPIRATORY (INHALATION) at 07:18

## 2019-09-05 RX ADMIN — ONDANSETRON 8 MG: 4 TABLET, FILM COATED ORAL at 11:34

## 2019-09-05 RX ADMIN — OXYCODONE HYDROCHLORIDE 10 MG: 5 TABLET ORAL at 16:52

## 2019-09-05 RX ADMIN — MIDODRINE HYDROCHLORIDE 5 MG: 2.5 TABLET ORAL at 16:53

## 2019-09-05 RX ADMIN — FLUOXETINE 40 MG: 20 CAPSULE ORAL at 04:57

## 2019-09-05 RX ADMIN — IPRATROPIUM BROMIDE AND ALBUTEROL SULFATE 3 ML: .5; 3 SOLUTION RESPIRATORY (INHALATION) at 00:40

## 2019-09-05 RX ADMIN — TAMSULOSIN HYDROCHLORIDE 0.4 MG: 0.4 CAPSULE ORAL at 08:53

## 2019-09-05 RX ADMIN — PANTOPRAZOLE SODIUM 40 MG: 40 TABLET, DELAYED RELEASE ORAL at 04:57

## 2019-09-05 RX ADMIN — OXYCODONE HYDROCHLORIDE 10 MG: 5 TABLET ORAL at 00:10

## 2019-09-05 RX ADMIN — CHOLECALCIFEROL TAB 10 MCG (400 UNIT) 1000 UNITS: 10 TAB at 08:53

## 2019-09-05 RX ADMIN — CETIRIZINE HYDROCHLORIDE 5 MG: 10 TABLET, FILM COATED ORAL at 08:53

## 2019-09-05 RX ADMIN — IPRATROPIUM BROMIDE AND ALBUTEROL SULFATE 3 ML: .5; 3 SOLUTION RESPIRATORY (INHALATION) at 12:52

## 2019-09-05 RX ADMIN — LACTULOSE 20 G: 10 SOLUTION ORAL at 08:53

## 2019-09-05 RX ADMIN — ONDANSETRON 8 MG: 4 TABLET, FILM COATED ORAL at 04:57

## 2019-09-05 RX ADMIN — POLYETHYLENE GLYCOL (3350) 17 G: 17 POWDER, FOR SOLUTION ORAL at 08:53

## 2019-09-05 RX ADMIN — MIDODRINE HYDROCHLORIDE 5 MG: 2.5 TABLET ORAL at 08:53

## 2019-09-05 RX ADMIN — OXYCODONE HYDROCHLORIDE 10 MG: 5 TABLET ORAL at 11:35

## 2019-09-05 RX ADMIN — APIXABAN 5 MG: 5 TABLET, FILM COATED ORAL at 08:53

## 2019-09-05 RX ADMIN — MIDODRINE HYDROCHLORIDE 5 MG: 2.5 TABLET ORAL at 11:35

## 2019-09-05 NOTE — DISCHARGE PLACEMENT REQUEST
"Jose Ya (69 y.o. Male)     Date of Birth Social Security Number Address Home Phone MRN    1950  [id number]  [place] [address] [phone] [id number]    Hinduism Marital Status          Other        Admission Date Admission Type Admitting Provider Attending Provider Department, Room/Bed    8/6/19 Emergency Aaron Rao MD Heinss, Karl F, MD [address], 3338/1P    Discharge Date Discharge Disposition Discharge Destination         Home or Self Care              Attending Provider:  Aaron Rao MD    Allergies:  Chlorhexidine, Contrast Dye, Fentanyl    Isolation:  None   Infection:  None   Code Status:  CPR    Ht:  182.9 cm (72\")   Wt:  73.5 kg (162 lb 1.6 oz)    Admission Cmt:  None   Principal Problem:  None                Active Insurance as of 8/6/2019     Primary Coverage     Payor Plan Insurance Group Employer/Plan Group    MEDICARE MEDICARE A & B      Payor Plan Address Payor Plan Phone Number Payor Plan Fax Number Effective Dates    [address] [phone]  4/1/2005 - None Entered    [facility] [id number]       Subscriber Name Subscriber Birth Date Member ID       JOSE YA 1950 [id number]           Secondary Coverage     Payor Plan Insurance Group Employer/Plan Group    [employer] - ASA [id number]     Payor Plan Address Payor Plan Phone Number Payor Plan Fax Number Effective Dates    [address]   6/16/2016 - None Entered    [facility] [id number]       Subscriber Name Subscriber Birth Date Member ID       JOSE YA 1950 [id number]                 Emergency Contacts      (Rel.) Home Phone Work Phone Mobile Phone    Emmy Ya (Spouse) [phone] -- [phone]    Carlos Ya (Son) -- -- [phone]    Richard Ya (Son) -- -- [phone]        [address] [address]  Dept. Phone:  [phone]  Dept. Fax:   Date Ordered: Sep 5, 2019         Patient:  " "Todd Phillips MRN:  [id number]   [address]  [place] [address] :  1950  SSN:    Phone: [phone] Sex:  M     Weight: 73.5 kg (162 lb 1.6 oz)         Ht Readings from Last 1 Encounters:   19 182.9 cm (72\")         Home Oxygen Therapy          (Order ID: [id number])    Diagnosis:  Malignant neoplasm of pancreas, unspecified location of malignancy (CMS/HCC) (C25.9 [ICD-10-CM] 157.9 [ICD-9-CM])   Quantity:  1  Comments: O2 to go to 4L with mobilization     Delivery Modality: Nasal Cannula  Liters Per Minute: 2  Duration: Continuous  Equipment:  Oxygen Concentrator &  &  Portable Gaseous Oxygen System & Portable Oxygen Contents Gaseous &  Conserving Regulator  The face to face evaluation was performed on: 2019  Length of Need (99 Months = Lifetime): 99 Months = Lifetime        Authorizing Provider's Phone: [phone]   Authorizing Provider:Aaron Rao MD  Authorizing Provider's NPI: [id number]  Order Entered By: Aaron Rao MD 2019 12:00 PM     Electronically signed by: Aaron Rao MD 2019 12:00 PM               History & Physical      Nickolas Donis MD at 2019  5:13 PM            [facility] Medicine Services  History & Physical          Patient Identification:  Name:  Todd Phillips  Age:  69 y.o.  Sex:  male  :  1950  MRN:  [id number]   Visit Number:  [id number]  Primary Care Physician:  Adiel Denney MD    I have seen the patient in conjunction with VIMAL Pond and I agree with the following statements:     Subjective     Chief complaint: fever    History of presenting illness:    Mr. Phillips is a 69 year old male patient with metastatic pancreatic cancer who presented to [facility] ED on 2019. He states he was running a fever over the weekend around 100. His wife states last night it latoya to 100.8 and was up to 101.8 by this am. His most recent chemo was last Tuesday. He denies " "any cough, vomiting, no diarrhea, denies sore throat, no upper respiratory symptoms. He denies any urinary symptoms. He did a round of Levaquin around the first of last week, his wife states Dr. Alves thought he had a \"touch of PNA at that time\". He has had chills and diaphoresis. He reports worsening epigastric abdominal pain over the weekend as well, despite his oncologist increasing his pain medication regimen. His last hospital admission was April 21-25 2019 at [facility] for possible bacterial enteritis seen on CT.     His work up in the ED showed initial troponin T of <0.010, glucose 178, 133, potassium 4.2, chloride 97, creatinine 1.09, alkaline phosphatase 224, BUN 13, total protein 5.8, albumin 2.1, normal LFTs, CRP 5.63, INR 1.29, WBC 19.28, H&H 8.2 and 26.1, platelet count 131.  CT of the abdomen pelvis without contrast showed multiple level compression of the vertebral bodies that are probably chronic, no masses, free air or free fluid, no wall thickening of the GI tract.  CT of the chest showed a 5 mm left upper lobe nodule, and a right lower lobe nodule measuring almost 1 cm, also noted to have improved aeration of the bases but persistent prominent interstitial markings and some groundglass attenuation, per radiology reading.     His medical history is significant for Pancreatic Cancer. He is currently undergoing chemo therapy, he did have a whipple done on December 31 2018 at [facility]. He also had his GB removed on August 10 2018. He denies any hx of heart stents, no strokes or blood clots. He does have atrial fibrillation on chronic oral anticoagulation. He denies any use of tobacco or ETOH. He is a former strip .       ---------------------------------------------------------------------------------------------------------------------   Review of Systems   Constitutional: Positive for activity change, appetite change, chills, diaphoresis and fever. Negative for fatigue.   HENT: Negative " for congestion, postnasal drip, rhinorrhea, sinus pressure, sinus pain and sore throat.    Eyes: Negative for photophobia, pain, discharge, redness, itching and visual disturbance.   Respiratory: Negative for cough, shortness of breath and wheezing.    Cardiovascular: Negative for chest pain and leg swelling.   Gastrointestinal: Positive for abdominal pain (epigastric, worsening over the weekend) and nausea. Negative for abdominal distention, blood in stool, constipation, diarrhea and vomiting.             Endocrine: Negative for cold intolerance, heat intolerance, polydipsia, polyphagia and polyuria.   Genitourinary: Negative for difficulty urinating, dysuria, flank pain and hematuria.   Musculoskeletal: Negative for arthralgias, back pain, joint swelling, myalgias, neck pain and neck stiffness.   Skin: Negative for color change, pallor, rash and wound.   Allergic/Immunologic: Positive for immunocompromised state. Negative for environmental allergies and food allergies.   Neurological: Positive for weakness (generalized). Negative for dizziness, tremors, seizures, syncope, light-headedness, numbness and headaches.   Hematological: Negative for adenopathy. Does not bruise/bleed easily.   Psychiatric/Behavioral: Negative for agitation, behavioral problems and confusion.      ---------------------------------------------------------------------------------------------------------------------   Past Medical History:   Diagnosis Date   • Antral gastritis    • Asthma    • Atrial fibrillation (CMS/[facility])     treated with oral blood thinner   • Chest pain    • COPD (chronic obstructive pulmonary disease) (CMS/[facility])    • Coronary artery disease     no stents   • Diabetes mellitus (CMS/[facility])     type 2 - checks sugar 4 times per day    • Duodenitis    • Elevated cholesterol    • Emphysema of lung (CMS/[facility])    • Gallbladder disease     removed    • GERD (gastroesophageal reflux disease)    • Hard to intubate     busted lip last  time   • Hyperlipidemia    • Hypertension    • Jaundice    • Kidney stone    • Kidney stones     had lithotripsy and passed 36 kidney stones as well as had one surgically removed.   • Malignant neoplasm of head of pancreas (CMS/HCC) 9/5/2018   • Nausea    • Obstructive sleep apnea on CPAP     compliant with machine    • Palpitations    • Pneumonia 08/2018   • Reflux esophagitis    • Renal failure     stage 3 kidney disease   • Sepsis (CMS/HCC)      Past Surgical History:   Procedure Laterality Date   • BILE DUCT STENT PLACEMENT      [facility] 2 weeks ago    • CARDIAC CATHETERIZATION  11/01/1999   • CARDIOVASCULAR STRESS TEST  09/2012   • CHOLECYSTECTOMY N/A 8/10/2018    Procedure: CHOLECYSTECTOMY LAPAROSCOPIC;  Surgeon: Ronak Downs MD;  Location: [facility] OR;  Service: General   • COLONOSCOPY     • CYSTOSCOPY BLADDER STONE LITHOTRIPSY     • ECHO - CONVERTED  09/2012   • ERCP N/A 8/14/2018    Procedure: ENDOSCOPIC RETROGRADE CHOLANGIOPANCREATOGRAPHY WITH PAPILLOTOMY;  Surgeon: Scot Su MD;  Location:  [facility] OR;  Service: Gastroenterology   • ERCP N/A 10/1/2018    Procedure: ENDOSCOPIC RETROGRADE CHOLANGIOPANCREATOGRAPHY;  Surgeon: Jefry Luna MD;  Location:  [facility];  Service: Gastroenterology   • KIDNEY STONE SURGERY     • KIDNEY STONE SURGERY      open abdominal surgery   • PORTACATH PLACEMENT Right 10/23/2018    Procedure: INSERTION OF PORTACATH;  Surgeon: Ronak Downs MD;  Location:  [facility] OR;  Service: General   • TONSILLECTOMY     • UPPER GASTROINTESTINAL ENDOSCOPY  08/30/2012   • WHIPPLE PROCEDURE N/A 12/31/2018    Procedure: WHIPPLE PROCEDURE, BIOPSY OF LIVER, PORTAL VEIN RESECTION AND REPAIR, G/J TUBE PLACEMENT;  Surgeon: Miquel Brody MD;  Location:  [facility] OR;  Service: General     Family History   Problem Relation Age of Onset   • Heart attack Mother    • Heart disease Mother    • Stroke Mother    • Kidney disease Mother    • Heart  failure Mother    • Lung disease Father    • Tuberculosis Father    • Diabetes Sister    • Heart attack Brother    • Heart failure Brother    • Heart disease Brother    • Diabetes Sister    • No Known Problems Brother    • No Known Problems Brother      Social History     Socioeconomic History   • Marital status:      Spouse name: Not on file   • Number of children: Not on file   • Years of education: Not on file   • Highest education level: Not on file   Tobacco Use   • Smoking status: Never Smoker   • Smokeless tobacco: Never Used   Substance and Sexual Activity   • Alcohol use: No   • Drug use: No   • Sexual activity: Defer     Partners: Female   Social History Narrative    He is  and lives alone with his wife although they have 3 children together who all live close by. He is retired from [employer] and was exposed to Agent Orange during [place]. He is a lifelong nonsmoker.      ---------------------------------------------------------------------------------------------------------------------   Allergies:  Chlorhexidine; Contrast dye; and Fentanyl  ---------------------------------------------------------------------------------------------------------------------     Medications below are reported home medications pulling from within the system; at this time, these medications have not been reconciled unless otherwise specified and are in the verification process for further verifcation as current home medications.    Prior to Admission Medications     Prescriptions Last Dose Informant Patient Reported? Taking?    albuterol (PROVENTIL HFA;VENTOLIN HFA) 108 (90 BASE) MCG/ACT inhaler  Spouse/Significant Other Yes No    Inhale 2 puffs Every 4 (Four) Hours As Needed for Wheezing or Shortness of Air.    albuterol (PROVENTIL) (2.5 MG/3ML) 0.083% nebulizer solution  Spouse/Significant Other Yes No    Take 2.5 mg by nebulization Every 6 (Six) Hours As Needed for Wheezing or Shortness of Air.     allopurinol (ZYLOPRIM) 100 MG tablet   No No    Take 1 tablet by mouth Daily.    apixaban (ELIQUIS) 5 MG tablet tablet   No No    Take 1 tablet by mouth Every 12 (Twelve) Hours.    benzonatate (TESSALON PERLES) 100 MG capsule   No No    Take 2 capsules by mouth 3 (Three) Times a Day As Needed for Cough.    cholecalciferol (VITAMIN D3) 1000 units tablet  Spouse/Significant Other Yes No    Take 1,000 Units by mouth Daily.    dexamethasone (DECADRON) 4 MG tablet   No No    Take 2 tablets by mouth in the morning on days 2, 3, and 4 after chemo.    flecainide (TAMBOCOR) 50 MG tablet  Spouse/Significant Other Yes No    Take 50 mg by mouth Every 12 (Twelve) Hours.    FLUoxetine (PROzac) 20 MG capsule  Spouse/Significant Other Yes No    Take 20 mg by mouth 2 (Two) Times a Day.    insulin lispro (humaLOG) 100 UNIT/ML injection   Yes No    Inject  under the skin into the appropriate area as directed 3 (Three) Times a Day As Needed (before meals prn (2 units if > 150-199, 4 units if > 199)).    lactobacillus acidophilus (RISAQUAD) capsule capsule   No No    Take 1 capsule by mouth Daily.    mometasone (ASMANEX) 220 MCG/INH inhaler  Spouse/Significant Other Yes No    Inhale 2 puffs Every Night.    montelukast (SINGULAIR) 10 MG tablet  Spouse/Significant Other Yes No    Take 10 mg by mouth Every Night.    nitroglycerin (NITROSTAT) 0.4 MG SL tablet  Spouse/Significant Other Yes No    Place 0.4 mg under the tongue as needed for chest pain.    omeprazole (priLOSEC) 40 MG capsule   Yes No    Take 40 mg by mouth Daily.    ondansetron (ZOFRAN) 8 MG tablet   No No    Take 1 tablet by mouth Every 8 (Eight) Hours As Needed for Nausea or Vomiting.    oxyCODONE (oxyCONTIN) 10 MG 12 hr tablet   No No    Take 2 tablets by mouth Every 12 (Twelve) Hours.    oxyCODONE (ROXICODONE) 10 MG tablet   No No    Take 1-2 tabs every 4-6 hours as needed for pain    pancrelipase, Lip-Prot-Amyl, (CREON) 61305 units capsule delayed-release particles capsule    Yes No    Take 12,000 units of lipase by mouth 3 (Three) Times a Day With Meals.    polyethylene glycol (MIRALAX) packet  Spouse/Significant Other Yes No    Take 17 g by mouth Daily As Needed.    prochlorperazine (COMPAZINE) 10 MG tablet   No No    Take 1 tablet by mouth Every 6 (Six) Hours As Needed for Nausea or Vomiting.    promethazine (PHENERGAN) 12.5 MG tablet   No No    Take 1 tablet by mouth Every 6 (Six) Hours As Needed for Nausea or Vomiting.    promethazine (PHENERGAN) 25 MG suppository   No No    Insert 1 suppository into the rectum Every 6 (Six) Hours As Needed for Nausea or Vomiting.    rifaximin (XIFAXAN) 550 MG tablet  Self Yes No    Take 550 mg by mouth 2 (Two) Times a Day.    senna-docusate (SENNA-S) 8.6-50 MG per tablet   No No    Take 2 tablets by mouth 2 (Two) Times a Day.    Tiotropium Bromide-Olodaterol 2.5-2.5 MCG/ACT aerosol solution  Spouse/Significant Other Yes No    Inhale 2 puffs Daily.        Hospital Scheduled Meds:       sodium chloride 125 mL/hr     ---------------------------------------------------------------------------------------------------------------------   Objective       Vital Signs:  Temp:  [98.9 °F (37.2 °C)-99.4 °F (37.4 °C)] 98.9 °F (37.2 °C)  Heart Rate:  [] 88  Resp:  [18] 18  BP: ()/(65-88) 120/76      08/06/19  1130   Weight: 73 kg (161 lb)     Body mass index is 21.84 kg/m².  ---------------------------------------------------------------------------------------------------------------------       Physical Exam    Physical Exam:  Constitutional: Elderly gentleman, chronically ill appearing, no acute distress   HENT:  Head: Normocephalic and atraumatic.  Mouth:  Moist mucous membranes.    Eyes:  Conjunctivae and EOM are normal.  Pupils are equal, round, and reactive to light.  No scleral icterus.  Neck:  Neck supple.  No JVD present.    Cardiovascular:  Normal rate, regular rhythm, with no murmur.  Pulmonary/Chest:  No respiratory distress, no  wheezes, no crackles, with normal breath sounds and good air movement. Port-a-cath noted right upper chest wall.   Abdominal:  Soft.  Bowel sounds are present.  No distension. Tenderness to palpation epigastric region with mild guarding.  Musculoskeletal:  No edema, no tenderness, and no deformity.  No red or swollen joints anywhere.    Neurological:  Alert and oriented to person, place, and time.  No cranial nerve deficit.  No tongue deviation.  No facial droop.  No slurred speech. No gross focal deficits appreciated.  Skin:  Skin is warm and dry.  No rash noted.  No pallor.   Psychiatric:  Normal mood and affect.  Behavior is normal.  Judgment and thought content normal.   Peripheral vascular:  No edema and strong pulses on all 4 extremities.  ---------------------------------------------------------------------------------------------------------------------  EKG:  Sinus tachycardia, , QTc 451, no overt ST changes appreciated.        ---------------------------------------------------------------------------------------------------------------------   Results from last 7 days   Lab Units 08/06/19  1211 08/06/19  1205   CRP mg/dL 5.63*  --    LACTATE mmol/L  --  1.4   WBC 10*3/mm3 19.28*  --    HEMOGLOBIN g/dL 8.2*  --    HEMATOCRIT % 26.1*  --    MCV fL 90.3  --    MCHC g/dL 31.4*  --    PLATELETS 10*3/mm3 131*  --    INR  1.29*  --          Results from last 7 days   Lab Units 08/06/19  1211   SODIUM mmol/L 133*   POTASSIUM mmol/L 4.2   MAGNESIUM mg/dL 1.4*   CHLORIDE mmol/L 97*   CO2 mmol/L 24.6   BUN mg/dL 13   CREATININE mg/dL 1.09   EGFR IF NONAFRICN AM mL/min/1.73 67   CALCIUM mg/dL 8.6   GLUCOSE mg/dL 178*   ALBUMIN g/dL 2.81*   BILIRUBIN mg/dL 0.4   ALK PHOS U/L 224*   AST (SGOT) U/L 14   ALT (SGPT) U/L 15   Estimated Creatinine Clearance: 64.2 mL/min (by C-G formula based on SCr of 1.09 mg/dL).  No results found for: AMMONIA  Results from last 7 days   Lab Units 08/06/19  1211   TROPONIN T ng/mL  <0.010         Lab Results   Component Value Date    HGBA1C 5.80 (H) 04/21/2019     Lab Results   Component Value Date    TSH 1.502 11/18/2018    FREET4 1.14 10/30/2018     No results found for: PREGTESTUR, PREGSERUM, HCG, HCGQUANT  Pain Management Panel     Pain Management Panel Latest Ref Rng & Units 10/16/2018 9/12/2018    CREATININE UR mg/dL 208.8 -    AMPHETAMINES SCREEN, URINE Negative - Negative    BARBITURATES SCREEN Negative - Negative    BENZODIAZEPINE SCREEN, URINE Negative - Negative    BUPRENORPHINEUR Negative - Negative    COCAINE SCREEN, URINE Negative - Negative    METHADONE SCREEN, URINE Negative - Negative                        ---------------------------------------------------------------------------------------------------------------------  Imaging Results (last 7 days)     Procedure Component Value Units Date/Time    CT Abdomen Pelvis Without Contrast [[id number]] Collected:  08/06/19 1334     Updated:  08/06/19 1338    Narrative:          CT ABDOMEN PELVIS WO CONTRAST-      TECHNIQUE: Multiple axial CT images were obtained from lung bases  through pubic symphysis without administration of IV contrast.  Reformatted images in the coronal and/or sagittal plane(s) were  generated from the axial data set to facilitate diagnostic accuracy  and/or surgical planning.  Oral Contrast:NONE.     Radiation dose reduction techniques were utilized per ALARA protocol.  Automated exposure control was initiated through either or CareDoStayzilla or  DoseRight software packages by  protocol.             Clinical information  Sepsis      Comparison  NONE.     Findings  LOWER THORAX: Clear. No effusions.     ABDOMEN:        LIVER: Homogeneous. No focal hepatic mass or ductal dilatation.        GALLBLADDER: No dilation or stone identified.        PANCREAS: Unremarkable. No mass or ductal dilatation.        SPLEEN: Homogeneous. No splenomegaly.        ADRENALS: No mass.        KIDNEYS: No mass. No obstructive  uropathy.  No evidence of  urolithiasis.        GI TRACT: Non-dilated. No definite wall thickening.        PERITONEUM: No free air. No free fluid or loculated fluid  collections.        MESENTERY: Unremarkable.        LYMPH NODES: No lymphadenopathy.        VASCULATURE: No evidence of aneurysm.        ABDOMINAL WALL: No focal hernia or mass.           OTHER: None.     PELVIS:        BLADDER: No focal mass or significant wall thickening        REPRODUCTIVE: Unremarkable as visualized.        APPENDIX: Nondistended. No surrounding inflammation.     BONES: MULTIPLE LEVEL COMPRESSION OF THE VERTEBRAL BODIES THAT ARE  PROBABLY CHRONIC.       Impression:       Impression:  1. Unremarkable exam.  No source for the patient symptoms.  2. Other incidental/non-acute findings as above.     This report was finalized on 8/6/2019 1:35 PM by Dr. Helio Gleason MD.       CT Chest Without Contrast [[id number]] Collected:  08/06/19 1332     Updated:  08/06/19 1336    Narrative:       CT CHEST WO CONTRAST-      TECHNIQUE: Multiple axial CT images were obtained from lung apex through  upper abdomen without administration of IV contrast. Reformatted images  in the coronal and/or sagittal plane(s) were generated from the axial  data set to facilitate diagnostic accuracy and/or surgical planning.  Oral Contrast:NONE.     Radiation dose reduction techniques were utilized per ALARA protocol.  Automated exposure control was initiated through either or CareDoCloudkick or  DoseRigInvenias software packages by  protocol.             Clinical information  Sepsis      Comparison  Comparison CT performed on 07/18/2019.     Findings  LUNGS: 5 MM LEFT UPPER LOBE PULMONARY NODULE. SUBPLEURAL PULMONARY  NODULE RIGHT LOWER LOBE MEASURING NEARLY 1 CM AND APPEARING GROSSLY  STABLE. THE LUNGS ARE VERY EMPHYSEMATOUS.     HEART: Unremarkable.     PERICARDIUM: No effusion.     MEDIASTINUM: No masses. No enlarged lymph nodes.  No fluid collections.     PLEURA: No  pleural effusion. No pleural mass or abnormal calcification.     MAJOR AIRWAYS: Clear. No intrinsic mass.     VASCULATURE: No evidence of aneurysm.     VISUALIZED UPPER ABDOMEN:        LIVER: Homogeneous. No focal hepatic mass or ductal dilatation.        SPLEEN: Homogeneous. No splenomegaly.        ADRENALS: No mass.        KIDNEYS: No mass. No obstructive uropathy.  No evidence of  urolithiasis.        GI TRACT: Non-dilated. No definite wall thickening.        PERITONEUM: No free air. No free fluid or loculated fluid  collections.           ABDOMINAL WALL: No focal hernia or mass.           OTHER: None.     BONES: CHRONIC APPEARING COMPRESSION DEFORMITY OF T12 VERTEBRAL BODY.       Impression:       Impression:  Some improved aeration of the bases but with persistent prominent  interstitial markings and some groundglass attenuation.     This report was finalized on 8/6/2019 1:34 PM by Dr. Helio Gleason MD.       XR Chest 1 View [[id number]] Collected:  08/06/19 1306     Updated:  08/06/19 1309    Narrative:       EXAMINATION:  XR CHEST 1 VW-      CLINICAL INDICATION:     chest pain, fever, hx of pancreatic cancer, on  chemo     TECHNIQUE:  XR CHEST 1 VW-       COMPARISON: 04/21/2019      FINDINGS:   Right Port-A-Cath with tip in SVC. Patchy opacities in the bases again  noted.   Heart size is stable.   No pneumothorax.   No pleural effusion.   Bony and soft tissue structures are unremarkable.          Impression:       Stable radiographic appearance of the chest.     This report was finalized on 8/6/2019 1:07 PM by Dr. Helio Gleason MD.             Cultures: blood and urine cultures collected in the ED     ---------------------------------------------------------------------------------------------------------------------  Assessment / Plan       Assessment and Plan:     -Severe sepsis without significant source of infection (leukocytosis, Hypotension that responded to IV fluid bolus, tachycardia, elevated CRP,  fever): no significant source of infection at this time, but given history of pancreatic cancer and worsening epigastric pain, intra-abdominal source is high on differential. Granted, patient received steroids immediately following chemo, as well as neulasta, which could both be contributing to rise in WBC. CRP could simply be up from underlying malignancy, and fever could be tumor-related as well. Nevertheless, patient started on Invanz and vancomycin, infectious disease consulted, await blood and urine culture preliminary reports, repeat labs in the a.m.    -Hypomagnesemia: magnesium is 1.4, replacement protocol ordered.     -Normocytic Anemia: H/H is 8.2/26.1, at baseline, monitor.     -Mild thrombocytopenia:PLT count 131, continue to monitor    -pulmonary nodules, 5 mm, and 1cm: will need further evaluation and monitoring by hem/onc given his current dx of pancreatic cancer. No nodules noted on previous scans.      -Pancreatic Cancer, metastatic: currently receiving chemo and followed by Dr. Alves, hem/onc consulted.     -Paroxysmal Atrial Fibrillation on chronic oral anticoagulation:on eliquis for stroke prevention, rate controlled, resume meds including eliquis and flecainide. QTc acceptable at 451.     -CKD stage III: Creatinine is 1.09 at baseline, continue to monitor, gently hydrate given reports of nausea and poor PO intake, repeat labs in the a.m.    -COPD: Not felt to be in acute exacerbation, monitor respiratory status, continue home inhalers/nebs.    -DM Type 2, insulin dependent: A1c ordered, hypoglycemia protocol initiated, accu checks ac/hs and prn, diabetic diet, low dose sliding scale insulin ordered.     -Epigastric Discomfort: with nausea radiating into the chest and occurs intermittently over the last 3-4 days, trend troponin, h.pylori ordered. No acute abnormality noted on CT abdomen or chest. Continue home protonix. Continue home pain medication regimen with IV morphine available for  breakthrough.    Activity: Up with assistance  Precautions: Falls  DVT prophylaxis: anticoagulated on eliquis  GI prophylaxis: Protonix 40 IV daily    Code status: Full    Plan of care discussed with patient, his wife at bedside, and his RN Freya who was present during my interview and exam on the PCU.    Patient is high risk for the following reasons: Severe sepsis without significant etiology at this time, metastatic pancreatic cancer    VIMAL Cuellar  08/06/19  5:13 PM  ---------------------------------------------------------------------------------------------------------------------   * I have seen the patient in conjunction with VIMAL Pond, and have amended her note to reflect my own findings, assessment and plan.    Electronically signed by Nickolas Donis MD at 8/6/2019  8:57 PM       ICU Vital Signs     Row Name 09/05/19 1415 09/05/19 1300 09/05/19 1252 09/05/19 1105 09/05/19 0728       Vitals    Temp  --  --  --  97.9 °F (36.6 °C)  --    Temp src  --  --  --  Oral  --    Pulse  --  106  96  97  95    Heart Rate Source  --  --  --  Monitor  --    Resp  --  18  18  18  18    Resp Rate Source  --  --  --  Visual  --    BP  --  --  --  131/88  --    BP Location  --  --  --  Left arm  --    BP Method  --  --  --  Automatic  --    Patient Position  --  --  --  Lying  --       Oxygen Therapy    SpO2  85 %  (Abnormal)   98 %  97 %  98 %  99 %    Device (Oxygen Therapy)  room air  nasal cannula;humidified  nasal cannula;humidified  nasal cannula  --    Flow (L/min)  --  3  3  --  --    Row Name 09/05/19 0717 09/05/19 0638 09/05/19 0300 09/05/19 0054 09/05/19 0040       Vitals    Temp  --  97.5 °F (36.4 °C)  97.9 °F (36.6 °C)  --  --    Temp src  --  Oral  Oral  --  --    Pulse  86  83  97  93  93    Heart Rate Source  --  Monitor  Monitor  Monitor  Monitor    Resp  18  16  14  18  18    Resp Rate Source  --  Visual  Visual  Visual  Visual    BP  --  132/77  129/79  --  --    BP  Location  --  Left arm  Left arm  --  --    BP Method  --  Automatic  Automatic  --  --    Patient Position  --  Lying  Lying  --  --       Oxygen Therapy    SpO2  99 %  99 %  97 %  98 %  96 %    Pulse Oximetry Type  --  --  Continuous  Continuous  Continuous    Device (Oxygen Therapy)  nasal cannula  nasal cannula  nasal cannula  nasal cannula;humidified  nasal cannula;humidified    Flow (L/min)  3  --  3  3  3    Row Name 09/04/19 2300 09/04/19 1956 09/04/19 1955 09/04/19 1945 09/04/19 1900       Vitals    Temp  98.2 °F (36.8 °C)  --  --  --  97.6 °F (36.4 °C)    Temp src  Oral  --  --  --  Oral    Pulse  89  95  --  101  97    Heart Rate Source  Monitor  Monitor  --  Monitor  Monitor    Resp  18  20  --  20  16    Resp Rate Source  Visual  Visual  --  Visual  Visual    BP  132/80  --  --  --  129/82    BP Location  Left arm  --  --  --  Left arm    BP Method  Automatic  --  --  --  Automatic    Patient Position  Lying  --  --  --  Lying       Oxygen Therapy    SpO2  98 %  95 %  --  94 %  98 %    Pulse Oximetry Type  Continuous  Continuous  --  Continuous  Continuous    Device (Oxygen Therapy)  nasal cannula  nasal cannula;humidified  nasal cannula  nasal cannula;humidified  nasal cannula    Flow (L/min)  3  3  3  3  3    Row Name 09/04/19 1523 09/04/19 1351 09/04/19 1344 09/04/19 1135 09/04/19 0830       Vitals    Temp  97.8 °F (36.6 °C)  --  --  --  --    Temp src  Oral  --  --  --  --    Pulse  99  93  94  107  --    Heart Rate Source  Monitor  --  --  Monitor  --    Resp  20  20  20  24  --    Resp Rate Source  Visual  --  --  Visual  --    BP  137/89  --  --  132/92 reported vitals to ROHIT Mcmahon  --    BP Location  Left arm  --  --  Left arm  --    BP Method  Automatic  --  --  Automatic  --    Patient Position  Lying  --  --  Lying  --       Oxygen Therapy    SpO2  98 %  99 %  98 %  91 %  --    Pulse Oximetry Type  --  --  --  Continuous  --    Device (Oxygen Therapy)  nasal cannula  --  nasal cannula  nasal  cannula  nasal cannula;humidified    Flow (L/min)  --  --  3  --  3    Row Name 09/04/19 0646 09/04/19 0636 09/04/19 0622 09/04/19 0559 09/04/19 0555       Vitals    Temp  --  --  97.8 °F (36.6 °C)  --  --    Temp src  --  --  Oral  --  --    Pulse  101  105  102  --  --    Heart Rate Source  --  --  Monitor  --  --    Resp  20  22  20  --  --    Resp Rate Source  --  --  Visual  --  --    BP  --  --  129/85  --  --    BP Location  --  --  Left arm  --  --    BP Method  --  --  Automatic  --  --    Patient Position  --  --  Lying  --  --       Oxygen Therapy    SpO2  --  96 %  95 %  94 %  93 %    Pulse Oximetry Type  --  --  Continuous  Continuous  Continuous    Device (Oxygen Therapy)  --  humidified;nasal cannula  nasal cannula  nasal cannula;humidified  nasal cannula;humidified    Flow (L/min)  --  3  --  3 decreased per titrate order; RN aware  4 decreased per titrate order; RN aware    Row Name 09/04/19 0543 09/04/19 0310 09/04/19 0303 09/04/19 0300 09/04/19 0057       Vitals    Temp  --  --  --  98.3 °F (36.8 °C)  --    Temp src  --  --  --  Oral  --    Pulse  --  99  109  87  --    Heart Rate Source  --  Monitor  Monitor  Monitor  --    Resp  --  24  24  20  --    Resp Rate Source  --  Visual  Visual  Visual  --    BP  --  --  --  125/83  --    BP Location  --  --  --  Left arm  --    BP Method  --  --  --  Automatic  --    Patient Position  --  --  --  Lying  --       Oxygen Therapy    SpO2  86 %  (Abnormal)   94 %  84 %  (Abnormal)  pt woke up and was trying to get out of bed causing low SAT  --  97 %    Pulse Oximetry Type  Continuous  --  Continuous  --  Continuous    Device (Oxygen Therapy)  nasal cannula;humidified  --  nasal cannula;humidified  --  nasal cannula    Flow (L/min)  5 increased per titrate order; RN aware  --  3  --  3    Row Name 09/03/19 2300 09/03/19 1930 09/03/19 1929 09/03/19 1914 09/03/19 1856       Vitals    Temp  97.8 °F (36.6 °C)  --  --  --  97.5 °F (36.4 °C)    Temp src  Oral   --  --  --  Oral    Pulse  81  --  88  85  89    Heart Rate Source  Monitor  --  Monitor  Monitor  Monitor    Resp  20  --  20  20  20    Resp Rate Source  Visual  --  Visual  Visual  Visual    BP  118/75  --  --  --  108/68    BP Location  Left arm  --  --  --  Left arm    BP Method  Automatic  --  --  --  Automatic    Patient Position  Lying  --  --  --  Lying       Oxygen Therapy    SpO2  --  --  99 %  97 %  97 %    Pulse Oximetry Type  --  --  --  Continuous  --    Device (Oxygen Therapy)  --  nasal cannula  --  nasal cannula;humidified  --    Flow (L/min)  --  3  --  3  --    Row Name 09/03/19 1711 09/03/19 1421 09/03/19 1328 09/03/19 1132 09/03/19 1130       Vitals    Temp  --  98.1 °F (36.7 °C)  --  --  --    Temp src  --  Oral  --  --  --    Pulse  97  98  96  --  --    Heart Rate Source  Monitor  Monitor  --  --  --    Resp  --  12  18  --  --    Resp Rate Source  --  Visual  --  --  --    BP  106/58  91/61  --  --  --    BP Location  Left arm  Left arm  --  --  --    BP Method  Automatic  Automatic  --  --  --    Patient Position  Lying  Lying  --  --  --       Oxygen Therapy    SpO2  --  --  96 %  --  --    Pulse Oximetry Type  --  --  Continuous  --  --    Device (Oxygen Therapy)  --  --  nasal cannula  nasal cannula  nasal cannula    Flow (L/min)  --  --  3  2  2    Oxygen Concentration (%)  --  --  --  90  82 ambulating bedside    Row Name 09/03/19 1054 09/03/19 0955 09/03/19 0645 09/03/19 0628 09/03/19 0300       Vitals    Temp  97.7 °F (36.5 °C)  --  --  97.7 °F (36.5 °C)  97.6 °F (36.4 °C)    Temp src  Oral  --  --  Oral  Oral    Pulse  83  --  84  88  88    Heart Rate Source  Monitor  --  --  Monitor  Monitor    Resp  16  --  18  20  18    Resp Rate Source  Visual  --  --  Visual  Visual    BP  115/69  --  --  114/70  102/65    BP Location  Left arm  --  --  Left arm  Left arm    BP Method  Automatic  --  --  Automatic  Automatic    Patient Position  Lying  --  --  Lying  Lying       Oxygen Therapy     SpO2  97 %  --  99 %  99 %  98 %    Pulse Oximetry Type  --  --  Continuous  Continuous  --    Device (Oxygen Therapy)  nasal cannula  nasal cannula;humidified  nasal cannula  nasal cannula  --    Flow (L/min)  --  2  2  --  --    Oximetry Probe Site Changed  --  --  --  Yes  --    Row Name 09/03/19 0033 09/03/19 0025 09/02/19 2300 09/02/19 2007 09/1948       Vitals    Temp  --  --  98.4 °F (36.9 °C)  --  --    Temp src  --  --  Oral  --  --    Pulse  96  92  93  96  90    Heart Rate Source  Monitor  Monitor  Monitor  Monitor  Monitor    Resp  18  18  18  18  18    Resp Rate Source  Visual  Visual  Visual  Visual  Visual    BP  --  --  107/72  --  --    BP Location  --  --  Left arm  --  --    BP Method  --  --  Automatic  --  --    Patient Position  --  --  Lying  --  --       Oxygen Therapy    SpO2  99 %  99 %  99 %  99 %  99 %    Pulse Oximetry Type  --  Continuous  --  --  Continuous    Device (Oxygen Therapy)  --  nasal cannula;humidified  --  --  nasal cannula    Flow (L/min)  --  3  --  --  3    Row Name 09/02/19 1945 09/02/19 1900 09/02/19 1535             Vitals    Temp  --  97.9 °F (36.6 °C)  97.8 °F (36.6 °C)      Temp src  --  Oral  Oral      Pulse  --  92  86      Heart Rate Source  --  Monitor  Monitor      Resp  --  18  18      Resp Rate Source  --  Visual  Visual      BP  --  105/71  108/71      BP Location  --  Left arm  Left arm      BP Method  --  Automatic  Automatic      Patient Position  --  Lying  Lying         Oxygen Therapy    SpO2  --  97 %  98 %      Pulse Oximetry Type  --  --  Continuous      Device (Oxygen Therapy)  nasal cannula  --  nasal cannula      Flow (L/min)  3  --  --             Physician Progress Notes (most recent note)      Gwen Alves MD at 9/4/2019  7:03 PM          Date:  09/04/19    CC: Shortness of breath    Subjective:  Todd Phillips is seen this afternoon in follow-up of pancreatic cancer and shortness of breath.  Unfortunately despite aggressive  therapy with antimicrobials (including antifungal therapy), diuretics (which have recently induced hypotension) and aggressive nutritional and supportive care, Mr. Phillips's condition has continued to decline.  His pulmonary infiltrates are unchanged.  CRP is low.  No evidence of fluid overload.  He has some shortness of breath at rest but desaturates with minimal activity.  Further antibiotics / diuretics have not helped this.  He and has family have considered palliative care and hospice care.  He has decided that he is interested in palliative care at home but he doesn't want to enroll in hospice because it is very important to him to have PET-CT done.  We discussed this further.  I explained that the PET probably wouldn't change anything that we do because he is too weak to receive further chemotherapy whether it shows disease progression or even regression.   I explained that I thought it would be difficult for him to get to the test to do this given how short of breath he gets with minimal activity. Nonetheless, Mr. Phillips says he feels strongly that he wants to try to do this because it is important to him.  He complains of constipation and asks for a suppository which helps him at home.  Miralax and Lactulose haven't provided results (though he declined Lactulose because of nausea this AM).  His son was concerned about fecal impaction but the patient declines enema saying suppository has always helped him in the past.         Objective:  Medications:  Scheduled Meds:    allopurinol 100 mg Oral BID   apixaban 5 mg Oral Q12H   bisacodyl 10 mg Rectal Once   budesonide 0.5 mg Nebulization BID - RT   cetirizine 5 mg Oral Daily   cholecalciferol 1,000 Units Oral Daily   flecainide 50 mg Oral Q12H   FLUoxetine 40 mg Oral QAM   insulin aspart 0-7 Units Subcutaneous 4x Daily AC & at Bedtime   ipratropium-albuterol 3 mL Nebulization Q6H - RT   lactulose 20 g Oral Daily   lactulose 30 g Oral Once   midodrine 5 mg Oral  TID AC   montelukast 10 mg Oral Nightly   ondansetron 8 mg Oral TID AC   [START ON 9/5/2019] oxyCODONE 10 mg Oral Q6H   oxyCODONE 20 mg Oral Q12H   pantoprazole 40 mg Oral QAM   polyethylene glycol 17 g Oral Daily   senna-docusate 2 tablet Oral Nightly   sodium chloride 10 mL Intravenous Q12H   tamsulosin 0.4 mg Oral Daily     Continuous Infusions:   PRN Meds:.•  acetaminophen  •  acetaminophen  •  dextrose  •  dextrose  •  diphenhydrAMINE  •  glucagon (human recombinant)  •  heparin flush (porcine)  •  magnesium sulfate **OR** magnesium sulfate **OR** magnesium sulfate  •  Morphine  •  nitroglycerin  •  ondansetron  •  sodium chloride  •  sodium chloride    Physical Exam:  Vital Signs     Patient Vitals for the past 24 hrs:   BP Temp Temp src Pulse Resp SpO2   09/04/19 1523 137/89 97.8 °F (36.6 °C) Oral 99 20 98 %   09/04/19 1351 -- -- -- 93 20 99 %   09/04/19 1344 -- -- -- 94 20 98 %   09/04/19 1135 132/92 -- -- 107 24 91 %   09/04/19 0646 -- -- -- 101 20 --   09/04/19 0636 -- -- -- 105 22 96 %   09/04/19 0622 129/85 97.8 °F (36.6 °C) Oral 102 20 95 %   09/04/19 0559 -- -- -- -- -- 94 %   09/04/19 0555 -- -- -- -- -- 93 %   09/04/19 0543 -- -- -- -- -- (!) 86 %   09/04/19 0310 -- -- -- 99 24 94 %   09/04/19 0303 -- -- -- 109 24 (!) 84 %   09/04/19 0300 125/83 98.3 °F (36.8 °C) Oral 87 20 --   09/04/19 0057 -- -- -- -- -- 97 %   09/03/19 2300 118/75 97.8 °F (36.6 °C) Oral 81 20 --   09/03/19 1929 -- -- -- 88 20 99 %   09/03/19 1914 -- -- -- 85 20 97 %       General:  Awake, alert and oriented, appears unwell, fatigued  HEENT:  Pupils are equal, round and reactive to light and accommodation, Extra-ocular movements full, oropharynx clear   Neck:  No JVD, thyromegaly or lymphadenopathy  CV:  Regular rate and rhythm, no murmurs, rubs or gallops  Resp:  Breath sounds are somewhat diminished but generally clear with maybe a few fine crackles at the L>R bases.   No rhonchi or wheezes  Abd:  Soft, non-specific diffuse  abdominal tenderness, non-distended, bowel sounds present, no organomegaly  Ext:  No clubbing, cyanosis or edema  Lymph:  No cervical, supraclavicular, axillary, inguinal or femoral adenopathy  Neuro:  MS as above, CN II-XII intact, grossly non-focal exam    Labs / Studies:    Lab Results   Component Value Date    WBC 7.42 09/04/2019    HGB 8.5 (L) 09/04/2019    HCT 28.4 (L) 09/04/2019    MCV 89.6 09/04/2019    RDW 17.7 (H) 09/04/2019     09/04/2019    NEUTRORELPCT 58.0 09/04/2019    LYMPHORELPCT 25.2 09/04/2019    MONORELPCT 9.6 09/04/2019    EOSRELPCT 6.2 09/04/2019    BASORELPCT 0.5 09/04/2019    NEUTROABS 4.30 09/04/2019    LYMPHSABS 1.87 09/04/2019       Lab Results   Component Value Date     09/04/2019    K 4.1 09/04/2019    CO2 22.1 09/04/2019     09/04/2019    BUN 19 09/04/2019    CREATININE 1.24 09/04/2019    EGFRIFNONA 57 (L) 09/04/2019    EGFRIFAFRI  09/02/2016      Comment:      <15 Indicative of kidney failure.    GLUCOSE 135 (H) 09/04/2019    CALCIUM 10.1 09/04/2019    ALKPHOS 119 (H) 09/03/2019    AST 16 09/03/2019    ALT 9 09/03/2019    BILITOT 0.3 09/03/2019    ALBUMIN 3.13 (L) 09/03/2019    PROTEINTOT 6.8 09/03/2019    MG 2.7 (H) 08/27/2019    PHOS 3.8 08/26/2019     Lab Results   Component Value Date    CRP 0.38 09/04/2019    CRP 0.44 09/03/2019    CRP 0.76 (H) 09/02/2019    CRP 0.88 (H) 09/01/2019    CRP 1.31 (H) 08/31/2019    CRP 2.23 (H) 08/30/2019    CRP 1.88 (H) 08/29/2019    CRP 0.47 08/28/2019    CRP 0.63 (H) 08/27/2019    CRP 0.77 (H) 08/26/2019    CRP 1.24 (H) 08/25/2019    CRP 1.69 (H) 08/24/2019    CRP 1.47 (H) 08/23/2019    CRP 1.98 (H) 08/22/2019    CRP 1.68 (H) 08/21/2019       Lab Results   Component Value Date     134.3 (H) 09/02/2019     119.6 (H) 08/20/2019     82.9 (H) 07/30/2019     82.0 (H) 06/24/2019     76.1 (H) 04/23/2019     129.6 (H) 03/20/2019     85 (H) 02/20/2019     34 01/22/2019     104 (H) 01/07/2019      1349 (H) 12/19/2018     1089 (H) 12/10/2018     1576 (H) 11/13/2018     5149 (H) 10/19/2018     7602 (H) 09/25/2018     3636 (H) 09/07/2018     CT Chest 8-27-19  LUNGS: BILATERAL LOWER LOBE AND MINIMAL UPPER LOBE AIRSPACE DISEASE  CONSISTENT WITH PNEUMONIA.     HEART: Unremarkable.     PERICARDIUM: No effusion.     MEDIASTINUM: MEDIASTINAL AIR FROM UNCERTAIN ORIGIN.     PLEURA: No pleural effusion. No pleural mass or abnormal calcification.     MAJOR AIRWAYS: Clear. No intrinsic mass.     VASCULATURE: No evidence of aneurysm.     VISUALIZED UPPER ABDOMEN:        LIVER: Homogeneous. No focal hepatic mass or ductal dilatation.        SPLEEN: Homogeneous. No splenomegaly.        ADRENALS: No mass.        KIDNEYS: No mass. No obstructive uropathy.  No evidence of  urolithiasis.        GI TRACT: Non-dilated. No definite wall thickening.        PERITONEUM: No free air. No free fluid or loculated fluid  collections.           ABDOMINAL WALL: No focal hernia or mass.           OTHER: None.     BONES: No acute bony abnormality.     IMPRESSION:  Impression:  Mediastinal air from uncertain origin. Bilateral lower lobe and minimal  upper lobe airspace disease consistent with pneumonia.    CXR 9-4-19: Coarse interstitial fibrotic changes bilaterally which show modest improvement when compared to study of 8/31/2019.    CTChest 9-2-19  1. Pneumomediastinum, unchanged from the prior examination 8/27/2019.  2. No interval improvement in patchy basilar predominant infiltrates throughout both lungs. Cannot exclude an underlying component of interstitial lung disease/fibrosis. Follow-up to resolution recommended.    Assessment & Plan:  Todd Phillips is a 69 y.o. year-old male with now metastatic  pancreatic cancer admitted with pneumonia now with worsening shortness of breath, increased pulmonary congestion, anorexia, hypoalbuminemia.    1.  Metastatic pancreatic cancer:  -  I fear that this is the  underlying problem that is driving Mr. Phillips's problems.  -  His  level is increasing despite his treatment which would support this idea.  -  Despite very aggressive inpatient care treating infection, fluid overload, hypoalbuminemia, poor nutrition, etc, his condition has still continued to decline.    -  Given his overall declining condition, I have recommended they consider taking him home with home hospice. We discussed this at length with he and his wife and children previously and did so again today.  He would prefer to go home with palliative care (which his son has discussed with [facility] services).  He would like to get PET-CT as originally planned prior to his hospitalization next Friday and will then consider hospice transition.       2.  Constipation:  Persists despite Miralax and Lactulose (with variable compliance with the latter).  He is passing gas and has had reduced po intake.  He would like to try a suppository which has helped him in the past.    3.  Hypoalbuminemia with poor appetite:  Marinol discontinued because of significant altered mental status.  He is eating as best he can.    4.  Prognosis:  Mr. Phillips and his family understand that his prognosis is poor.  I had a long converstation with he, his wife, his 2 sons and his daughter.  I answered all their questions to the best of my ability and they were satisfied with our plan.      Discussed with Dr. Rao.    Appreciate very much the hospitalist team and care of this patient.  We will continue to follow with you.  Please do not hesitate to call with any questions or concerns.    Gwen Alves MD  09/04/19  7:03 PM        Electronically signed by Gwen Alves MD at 9/4/2019  7:16 PM

## 2019-09-05 NOTE — PROGRESS NOTES
Discharge Planning Assessment   Jameel     Patient Name: Todd Phillips  MRN: [id number]  Today's Date: 9/5/2019    Admit Date: 8/6/2019      Discharge Plan     Row Name 09/05/19 1459       Plan    Final Discharge Disposition Code  01 - home or self-care    Final Note Pt is being discharged home today. SS received orders for oxygen, home health services and palliative care. SS spoke to pt and family who states pt was already receiving [facility] prior to admission. Pt's family is requesting oxygen to be arranged with [facility]. SS contacted [facility] per Aisha and they state being unable to accept pt back due to being unable to provide the level of care that pt requires. SS notified Dr. Rao. SS notified pt's family and they voice understanding. SS contacted [facility] per Troy with referral and faxed referral including order for Palliative Care to [facility]. Pt's family continue to be in agreement with being discharged home today. Pt's spouse spoke to [facility] and they will admit him on 9/12. [facility]. [facility] Yakelin and provided update. [facility] will f[facility]u with [facility] at home. SS contacted [facility] per Jeniffer to arrange oxygen. SS faxed information including order to [facility]. [facility] to deliver oxygen to pt's home today. Pt's son has been in contact with [facility] today. SS completed EMS PCS form and will contact EMS when pt is ready for transport. Nurse will call report to [facility]. SS to follow.     15:30 SS contacted [facility] per Sourav to arrange transport. No other needs identified.              Home Medical Care      Service Provider Request Status Selected Services Address Phone Number Fax Number    [facility] Selected  Home Hospice [address] [place] [address] [place] [address] [phone]    [facility] N/A [address] [facility] [address] [phone] --     MARTIN Cronin

## 2019-09-05 NOTE — THERAPY TREATMENT NOTE
Acute Care - Physical Therapy Treatment Note   [place]     Patient Name: Todd Phillips  : 1950  MRN: [id number]  Today's Date: 2019  Onset of Illness/Injury or Date of Surgery: 19  Date of Referral to PT: 19  Referring Physician: Dr. Harley    Admit Date: 2019    Visit Dx:    ICD-10-CM ICD-9-CM   1. Sepsis, due to unspecified organism (CMS/Formerly [facility]) A41.9 038.9     995.91   2. Dehydration with hyponatremia E87.1 276.1   3. Hypomagnesemia E83.42 275.2   4. Malignant neoplasm of pancreas, unspecified location of malignancy (CMS/Formerly [facility]) C25.9 157.9   5. Chronic anticoagulation, Eliquis Z79.01 V58.61   6. PAF on home Eliquis (CMS/Formerly [facility]) I48.91 427.31     Patient Active Problem List   Diagnosis   • Hyperlipidemia   • Gout without tophus   • Anxiety and depression   • Primary insomnia   • Gastroesophageal reflux disease without esophagitis   • Nonobstructive atherosclerosis of coronary artery   • CKD stage 3 secondary to diabetes (CMS/[facility]ly [facility])   • DM2 (diabetes mellitus, type 2) (CMS/Formerly [facility])   • Paroxysmal atrial fibrillation   • Chronic anticoagulation, Eliquis   • Encounter for monitoring flecainide therapy   • History of Malignant neoplasm of head of pancreas S/P Whipple and chemotherapy (ongoing)  (CMS/Formerly [facility])   • Bacteremia due to Escherichia coli   • Biliary obstruction   • Thrombocytopenia (CMS/[facility])   • Anemia due to chemotherapy, chronic disease, and possible iron deficiency.  Transfuse 19   • Fever   • Post-operative confusion and somnolence, likely due to oversedation   • PAF on home Eliquis (CMS/[facility]ly [facility])   • Adult necrotizing enterocolitis (CMS/HCC)   • Encephalopathy   • Intra-abdominal abscess (S/P Percutaneous drain)   • Metabolic encephalopathy   • Dehydration   • Nausea   • Iron deficiency anemia secondary to inadequate dietary iron intake   • Malabsorption of iron   • Pancytopenia due to antineoplastic chemotherapy (CMS/HCC)   • Minimal pulmonary infiltrate left lower lobe.  Cannot rule out early  pneumonia   • Possible bacterial enteritis seen on CT   • Sepsis (CMS/HCC)       Therapy Treatment    Rehabilitation Treatment Summary     Row Name 09/05/19 1510             Treatment Time/Intention    Discipline  physical therapist  -AG      Document Type  therapy note (daily note)  -AG      Subjective Information  complains of;weakness;dyspnea  -AG      Mode of Treatment  physical therapy  -AG      Patient/Family Observations  pt supine; family present. 2L O2 nc in place.  Pt. cooperative and agreeable to participate to tolerance.   -AG      Care Plan Review  care plan/treatment goals reviewed;risks/benefits reviewed  -AG      Care Plan Review, Other Participant(s)  son  -AG      Patient Effort  adequate  -AG      Patient Response to Treatment  pt. unable to tolerate prolonged standing sufficiently to ambulate; rapid O2 desaturation occurs.   -AG      Recorded by [AG] Aletha Metcalf, PT 09/05/19 1518      Row Name 09/05/19 1510             Vital Signs    Intratreatment Heart Rate (beats/min)  107  -AG      Pre SpO2 (%)  94  -AG      O2 Delivery Pre Treatment  supplemental O2  -AG      Intra SpO2 (%)  80  -AG      O2 Delivery Intra Treatment  supplemental O2  -AG      Post SpO2 (%)  98  -AG      O2 Delivery Post Treatment  supplemental O2  -AG      Recorded by [AG] Aletha Metcalf, PT 09/05/19 1518      Row Name 09/05/19 1510             Cognitive Assessment/Intervention- PT/OT    Orientation Status (Cognition)  oriented x 3  -AG      Follows Commands (Cognition)  WFL  -AG      Recorded by [AG] Aletha Metcalf, PT 09/05/19 1518      Row Name 09/05/19 1510             Safety Issues, Functional Mobility    Impairments Affecting Function (Mobility)  balance;endurance/activity tolerance;shortness of breath;strength  -AG      Recorded by [AG] Aletha Metcalf, PT 09/05/19 1521      Row Name 09/05/19 1510             Mobility Assessment/Intervention    Left Lower Extremity (Weight-bearing Status)  weight-bearing as tolerated  (WBAT)  -AG      Right Lower Extremity (Weight-bearing Status)  weight-bearing as tolerated (WBAT)  -AG      Recorded by [AG] Aletha Metcalf, PT 09/05/19 1518      Row Name 09/05/19 1510             Bed Mobility Assessment/Treatment    Bed Mobility Assessment/Treatment  scooting/bridging;supine-sit;sit-supine  -AG      Supine-Sit [place] (Bed Mobility)  supervision  -AG      Sit-Supine [place] (Bed Mobility)  supervision  -AG      Bed Mobility, Safety Issues  decreased use of arms for pushing/pulling;decreased use of legs for bridging/pushing  -AG      Assistive Device (Bed Mobility)  bed rails  -AG      Recorded by [AG] Aletha Metcalf, PT 09/05/19 1518      Row Name 09/05/19 1510             Transfer Assessment/Treatment    Transfer Assessment/Treatment  sit-stand transfer;stand-sit transfer  -AG      Recorded by [AG] Aletha Metcalf, PT 09/05/19 1518      Row Name 09/05/19 1510             Sit-Stand Transfer    Sit-Stand [place] (Transfers)  verbal cues;minimum assist (75% patient effort)  -AG      Assistive Device (Sit-Stand Transfers)  walker, front-wheeled  -AG      Recorded by [AG] Aletha Metcalf, PT 09/05/19 1518      Row Name 09/05/19 1510             Stand-Sit Transfer    Stand-Sit [place] (Transfers)  verbal cues;minimum assist (75% patient effort)  -AG      Assistive Device (Stand-Sit Transfers)  walker, front-wheeled  -AG      Recorded by [AG] Aletha Metcalf, PT 09/05/19 1518      Row Name 09/05/19 1510             Gait/Stairs Assessment/Training    [place] Level (Gait)  verbal cues;minimum assist (75% patient effort)  -AG      Assistive Device (Gait)  walker, front-wheeled  -AG      Distance in Feet (Gait)  4 L sidesteps to head of bed  -AG      Comment (Gait/Stairs)  pt. able to perform 3-4 trials of sit<>stand at bedside w/ RW; however, rapid O2 desaturation and fatigue prevented ambulation.   -AG2      Recorded by [AG] Aletha Metcalf, PT 09/05/19 1524  [AG2] Aletha Metcalf, PT  09/05/19 1518      Row Name 09/05/19 1510             Motor Skills Assessment/Interventions    Additional Documentation  Balance (Group);Balance Interventions (Group)  -AG      Recorded by [AG] Aletha Metcalf, PT 09/05/19 1518      Row Name 09/05/19 1510             Balance    Balance  static sitting balance;static standing balance  -AG      Recorded by [AG] Aletha Metcalf, PT 09/05/19 1518      Row Name 09/05/19 1510             Static Sitting Balance    Level of [place] (Unsupported Sitting, Static Balance)  standby assist  -AG      Sitting Position (Unsupported Sitting, Static Balance)  sitting on edge of bed  -AG      Time Able to Maintain Position (Unsupported Sitting, Static Balance)  more than 5 minutes  -AG      Recorded by [AG] Aletha Metcalf, PT 09/05/19 1518      Row Name 09/05/19 1510             Static Standing Balance    Level of [place] (Supported Standing, Static Balance)  contact guard assist;minimal assist, 75% patient effort  -AG      Time Able to Maintain Position (Supported Standing, Static Balance)  30 to 45 seconds  -AG      Assistive Device Utilized (Supported Standing, Static Balance)  walker, rolling  -AG      Recorded by [AG] Aletha Metcalf, PT 09/05/19 1518      Row Name 09/05/19 1510             Positioning and Restraints    Pre-Treatment Position  in bed  -AG      Post Treatment Position  bed  -AG      In Bed  notified nsg;side lying left;call light within reach;encouraged to call for assist;with family/caregiver;side rails up x3  -AG      Recorded by [AG] Aletha Metcalf, PT 09/05/19 1518      Row Name 09/05/19 1510             Pain Assessment    Additional Documentation  Pain Scale: FACES Pre/Post-Treatment (Group)  -AG      Recorded by [AG] Aletha Metcalf, PT 09/05/19 1518      Row Name 09/05/19 1510             Pain Scale: FACES Pre/Post-Treatment    Pain: FACES Scale, Pretreatment  0-->no hurt  -AG      Pain: FACES Scale, Post-Treatment  0-->no hurt  -AG      Recorded by  [AG] DixonAletha, PT 09/05/19 1518      Row Name 09/05/19 1510             Coping    Observed Emotional State  calm;cooperative  -AG      Verbalized Emotional State  acceptance  -AG      Recorded by [AG] DixonAletha Jaison, PT 09/05/19 1518      Row Name 09/05/19 1510             Plan of Care Review    Plan of Care Reviewed With  patient;son  -AG      Recorded by [AG] DixonAletha Jaison, PT 09/05/19 1518      Row Name 09/05/19 1510             Outcome Summary/Treatment Plan (PT)    Daily Summary of Progress (PT)  progress toward functional goals is gradual  -AG      Barriers to Overall Progress (PT)  improved functional bed mobility and transfers observed, however,  rapid O2 desaturation and limited standing endurance prevents progress with gait training.   -AG      Plan for Continued Treatment (PT)  continue POC  -AG      Recorded by [AG] DixonAletha Jaison, PT 09/05/19 1521        User Key  (r) = Recorded By, (t) = Taken By, (c) = Cosigned By    Initials Name Effective Dates Discipline    AG Aletha Metcalf, PT 04/03/18 -  PT               Rehab Goal Summary     Row Name 09/05/19 1500             Bed Mobility Goal 1 (PT)    Time Frame (Bed Mobility Goal 1, PT)  -- goal met  -AG         Transfer Goal 1 (PT)    Time Frame (Transfer Goal 1, PT)  -- goal met  -AG         Gait Training Goal 1 (PT)    Time Frame (Gait Training Goal 1, PT)  -- goal ongoing  -AG        User Key  (r) = Recorded By, (t) = Taken By, (c) = Cosigned By    Initials Name Provider Type Discipline     Aletha Metcalf, PT Physical Therapist PT          Physical Therapy Education     Title: PT OT SLP Therapies (Done)     Topic: Physical Therapy (Done)     Point: Mobility training (Done)     Learning Progress Summary           Patient Acceptance, E,D, VU,NR by AG at 9/5/2019  3:18 PM    Acceptance, E, VU by LM at 9/4/2019 10:09 AM    Acceptance, E, VU by BC at 9/3/2019  2:38 PM    Acceptance, E, NR by LM at 9/3/2019 11:26 AM    Acceptance, E, NR by TT at  9/2/2019  2:54 PM    Acceptance, E,TB, VU by RF at 8/28/2019  3:02 PM    Acceptance, E, VU by LM at 8/28/2019 10:20 AM    Acceptance, E,D, VU,NR by LL at 8/27/2019  4:11 PM    Acceptance, E, VU by CL at 8/24/2019  2:27 PM    Acceptance, E,TB, VU by LJ at 8/23/2019 10:53 AM    Acceptance, E,TB, VU by BC1 at 8/22/2019 11:58 PM    Acceptance, E, VU by BC at 8/22/2019 11:36 AM    Acceptance, E, VU by BC at 8/22/2019 11:36 AM    Acceptance, E,TB, VU by LJ at 8/22/2019 10:58 AM    Acceptance, E,TB, VU by LJ at 8/18/2019 10:29 AM    Acceptance, E,TB, VU by LJ at 8/16/2019 12:22 PM    Acceptance, E,TB, VU by AD at 8/16/2019 10:16 AM    Acceptance, D, DU by RT at 8/15/2019  3:29 PM    Acceptance, E, NR by BC at 8/14/2019  4:19 PM   Family Acceptance, E,D, VU,NR by AG at 9/5/2019  3:18 PM    Acceptance, E, VU by LM at 9/4/2019 10:09 AM    Acceptance, E, NR by TT at 9/2/2019  2:54 PM    Acceptance, E, VU by CL at 8/24/2019  2:27 PM                   Point: Home exercise program (Done)     Learning Progress Summary           Patient Acceptance, E,D, VU,NR by AG at 9/5/2019  3:18 PM    Acceptance, E, VU by LM at 9/4/2019 10:09 AM    Acceptance, E, VU by BC at 9/3/2019  2:38 PM    Acceptance, E, NR by LM at 9/3/2019 11:26 AM    Acceptance, E, NR by TT at 9/2/2019  2:54 PM    Acceptance, E,TB, VU by RF at 8/28/2019  3:02 PM    Acceptance, E, VU by LM at 8/28/2019 10:20 AM    Acceptance, E,D, VU,NR by LL at 8/27/2019  4:11 PM    Acceptance, E, VU by CL at 8/24/2019  2:27 PM    Acceptance, E,TB, VU by LJ at 8/23/2019 10:53 AM    Acceptance, E,TB, VU by BC1 at 8/22/2019 11:58 PM    Acceptance, E, VU by BC at 8/22/2019 11:36 AM    Acceptance, E, VU by BC at 8/22/2019 11:36 AM    Acceptance, E,TB, VU by LJ at 8/22/2019 10:58 AM    Acceptance, E,TB, VU by LJ at 8/18/2019 10:29 AM    Acceptance, E,TB, VU by LJ at 8/16/2019 12:22 PM    Acceptance, E,TB, VU by AD at 8/16/2019 10:16 AM    Acceptance, D, DU by RT at 8/15/2019  3:29 PM     Acceptance, E, NR by BC at 8/14/2019  4:19 PM   Family Acceptance, E,D, VU,NR by AG at 9/5/2019  3:18 PM    Acceptance, E, VU by LM at 9/4/2019 10:09 AM    Acceptance, E, NR by TT at 9/2/2019  2:54 PM    Acceptance, E, VU by CL at 8/24/2019  2:27 PM                   Point: Body mechanics (Done)     Learning Progress Summary           Patient Acceptance, E,D, VU,NR by AG at 9/5/2019  3:18 PM    Acceptance, E, VU by LM at 9/4/2019 10:09 AM    Acceptance, E, VU by BC at 9/3/2019  2:38 PM    Acceptance, E, NR by LM at 9/3/2019 11:26 AM    Acceptance, E, NR by TT at 9/2/2019  2:54 PM    Acceptance, E,TB, VU by RF at 8/28/2019  3:02 PM    Acceptance, E, VU by LM at 8/28/2019 10:20 AM    Acceptance, E,D, VU,NR by LL at 8/27/2019  4:11 PM    Acceptance, E, VU by CL at 8/24/2019  2:27 PM    Acceptance, E,TB, VU by LJ at 8/23/2019 10:53 AM    Acceptance, E,TB, VU by BC1 at 8/22/2019 11:58 PM    Acceptance, E, VU by BC at 8/22/2019 11:36 AM    Acceptance, E, VU by BC at 8/22/2019 11:36 AM    Acceptance, E,TB, VU by LJ at 8/22/2019 10:58 AM    Acceptance, E,TB, VU by LJ at 8/18/2019 10:29 AM    Acceptance, E,TB, VU by LJ at 8/16/2019 12:22 PM    Acceptance, E,TB, VU by AD at 8/16/2019 10:16 AM    Acceptance, D, DU by RT at 8/15/2019  3:29 PM    Acceptance, E, NR by BC at 8/14/2019  4:19 PM   Family Acceptance, E,D, VU,NR by AG at 9/5/2019  3:18 PM    Acceptance, E, VU by LM at 9/4/2019 10:09 AM    Acceptance, E, NR by TT at 9/2/2019  2:54 PM    Acceptance, E, VU by CL at 8/24/2019  2:27 PM                   Point: Precautions (Done)     Learning Progress Summary           Patient Acceptance, E,D, VU,NR by AG at 9/5/2019  3:18 PM    Acceptance, E, VU by LM at 9/4/2019 10:09 AM    Acceptance, E, VU by BC at 9/3/2019  2:38 PM    Acceptance, E, NR by LM at 9/3/2019 11:26 AM    Acceptance, E, NR by TT at 9/2/2019  2:54 PM    Acceptance, E,TB, VU by RF at 8/28/2019  3:02 PM    Acceptance, E, VU by LM at 8/28/2019 10:20 AM     Acceptance, E,D, VU,NR by LL at 8/27/2019  4:11 PM    Acceptance, E, VU by CL at 8/24/2019  2:27 PM    Acceptance, E,TB, VU by LJ at 8/23/2019 10:53 AM    Acceptance, E,TB, VU by BC1 at 8/22/2019 11:58 PM    Acceptance, E, VU by BC at 8/22/2019 11:36 AM    Acceptance, E, VU by BC at 8/22/2019 11:36 AM    Acceptance, E,TB, VU by LJ at 8/22/2019 10:58 AM    Acceptance, E,TB, VU by LJ at 8/18/2019 10:29 AM    Acceptance, E,TB, VU by LJ at 8/16/2019 12:22 PM    Acceptance, E,TB, VU by AD at 8/16/2019 10:16 AM    Acceptance, D, DU by RT at 8/15/2019  3:29 PM    Acceptance, E, NR by BC at 8/14/2019  4:19 PM   Family Acceptance, E,D, VU,NR by AG at 9/5/2019  3:18 PM    Acceptance, E, VU by LM at 9/4/2019 10:09 AM    Acceptance, E, NR by TT at 9/2/2019  2:54 PM    Acceptance, E, VU by CL at 8/24/2019  2:27 PM                               User Key     Initials Effective Dates Name Provider Type Discipline    CL 06/16/16 -  Yeny Lopez, RN Registered Nurse Nurse    AD 04/03/18 - 09/02/19 Dalton, Ashley Claudene, PT Physical Therapist PT    TT 06/08/18 -  Freya Swanson, RN Registered Nurse Nurse    BC 03/14/16 -  Jeniffer Guadarrama, PT Physical Therapist PT    AG 04/03/18 -  Aletha Metcalf, PT Physical Therapist PT    LL 05/02/16 -  Maribeth Dacosta, PTA Physical Therapy Assistant PT    RT 04/03/18 -  Faisal Marcos, PT Physical Therapist PT    LJ 04/30/18 -  Darlyn Roberson, RN Registered Nurse Nurse    RF 03/07/18 -  Krystal Oviedo, PTA Physical Therapy Assistant PT    LM 08/30/18 -  Salome Zurita, RN Registered Nurse Nurse    [facility] 11/14/18 -  Judah Daniels RN Registered Nurse Nurse                PT Recommendation and Plan     Outcome Summary/Treatment Plan (PT)  Daily Summary of Progress (PT): progress toward functional goals is gradual  Barriers to Overall Progress (PT): improved functional bed mobility and transfers observed, however,  rapid O2 desaturation and limited standing endurance prevents  progress with gait training.   Plan for Continued Treatment (PT): continue POC  Plan of Care Reviewed With: patient, son  Outcome Measures     Row Name 09/03/19 1400             How much help from another person do you currently need...    Turning from your back to your side while in flat bed without using bedrails?  2  -BC      Moving from lying on back to sitting on the side of a flat bed without bedrails?  2  -BC      Moving to and from a bed to a chair (including a wheelchair)?  2  -BC      Standing up from a chair using your arms (e.g., wheelchair, bedside chair)?  2  -BC      Climbing 3-5 steps with a railing?  1  -BC      To walk in hospital room?  2  -BC      AM-PAC 6 Clicks Score (PT)  11  -BC         Functional Assessment    Outcome Measure Options  AM-PAC 6 Clicks Basic Mobility (PT)  -BC        User Key  (r) = Recorded By, (t) = Taken By, (c) = Cosigned By    Initials Name Provider Type    BC Jeniffer Guadarrama, PT Physical Therapist         Time Calculation:   PT Charges     Row Name 09/05/19 1523 09/05/19 1521          Time Calculation    PT Received On  --  09/05/19  -     PT Goal Re-Cert Due Date  09/17/19  -  --        Time Calculation- PT    TCU Minutes- PT  --  23 min  -AG       User Key  (r) = Recorded By, (t) = Taken By, (c) = Cosigned By    Initials Name Provider Type     Aletha Metcalf, PT Physical Therapist        Therapy Charges for Today     Code Description Service Date Service Provider Modifiers Qty    [id number]  PT THERAPEUTIC ACT EA 15 MIN 9/5/2019 Aletha Metcalf, PT GP 2          PT G-Codes  Outcome Measure Options: AM-PAC 6 Clicks Basic Mobility (PT)  AM-PAC 6 Clicks Score (PT): 11    Aletha Metcalf, PT  9/5/2019

## 2019-09-05 NOTE — DISCHARGE INSTR - APPOINTMENTS
Palliative Care services of [facility] to provide home services.  You can reach the office at [phone].

## 2019-09-05 NOTE — PLAN OF CARE
Problem: Pain, Chronic (Adult)  Goal: Identify Related Risk Factors and Signs and Symptoms  Outcome: Ongoing (interventions implemented as appropriate)    Goal: Acceptable Pain/Comfort Level and Functional Ability  Outcome: Ongoing (interventions implemented as appropriate)      Problem: Patient Care Overview  Goal: Plan of Care Review  Outcome: Ongoing (interventions implemented as appropriate)   09/05/19 3723   Coping/Psychosocial   Plan of Care Reviewed With patient   Plan of Care Review   Progress no change       Problem: Fall Risk (Adult)  Goal: Identify Related Risk Factors and Signs and Symptoms  Outcome: Ongoing (interventions implemented as appropriate)    Goal: Absence of Fall  Outcome: Ongoing (interventions implemented as appropriate)      Problem: Skin Injury Risk (Adult)  Goal: Identify Related Risk Factors and Signs and Symptoms  Outcome: Ongoing (interventions implemented as appropriate)    Goal: Skin Health and Integrity  Outcome: Ongoing (interventions implemented as appropriate)      Problem: Mobility, Physical Impaired (Adult)  Goal: Identify Related Risk Factors and Signs and Symptoms  Outcome: Ongoing (interventions implemented as appropriate)    Goal: Enhanced Mobility Skills  Outcome: Ongoing (interventions implemented as appropriate)    Goal: Enhanced Functional Ability  Outcome: Ongoing (interventions implemented as appropriate)

## 2019-09-05 NOTE — DISCHARGE SUMMARY
Date of admission: 8/6/2019  Date of discharge: 9/5/2019    Principal diagnosis: Stage IV metastatic pancreas cancer with associated interstitial lung disease concerning for lymphangitic spread causing hypoxic respiratory failure  Secondary diagnosis:  Sepsis secondary to pneumonia  Hypomagnesemia  Diabetes  Persistent nausea  Intractable pain related to his pancreatic cancer  Chronic anemia  Hyponatremia  Constipation  Urinary hesitancy improved with Flomax  History of paroxysmal atrial fibrillation on chronic anticoagulation  Progressive functional decline    Consultants:  Dr. Faye infectious disease   pulmonology  /[facility]  Palliative care    Procedures:  CT head without significant findings  Multiple CT chest showing probable interstitial lung disease  CT abdomen without    Exam: Assisted by, Freya BEE, wife and son also present.  Patient appears chronically ill, his saturations are adequate on 2 L with any movement he desaturates even on oxygen, room air saturation documented by nursing 85%.  Lungs have bilateral breath sounds diminished at the bases occasional crackle at the right base laterally heart regular rate and rhythm abdomen soft some diffuse tenderness without peritoneal signs.  Vital signs: 127/82, 17, 99, 97.9    Hospital course: Please note this has been a very long hospitalization complicated, please see chart for full details, this is a summary.  Patient was admitted with SIRS, he is undergoing chemotherapy for his pancreatic cancer.  Of note he has metastatic pancreas cancer and each course of his chemotherapy has had to be de-escalated due to intolerance.  Patient was found to have probable left pneumonia after he was hydrated.  Infectious disease guided antibiotics.  Patient continued through the stay to have an interstitial pattern on imaging.  This did not respond to antibiotics.  His BNP was normal, interstial pattern did not really respond to diuresis  and patient was really intolerant to diuresis but because of hypotension.  Patient had multiple rounds of antibiotics.  Patient was seen by pulmonology, CRP normalized but patient continued not to improve from an imaging standpoint and continued to desaturate with minimal exertion.  He continued to have significant discomfort in his abdomen and continued to have persistent nausea.  His appetite was marginal.  His CA 19-9 showed evidence of worsening trend.  Initially the patient was good to be transferred last week to [facility] however [facility] hospitalist and pulmonologist reviewed these images and history and did not think they had anything else to add.  With patient not improving despite antibiotics and attempts at diuresis and evidence of ongoing worsening of the pancreatic cancer by markers, I explained as did other members of the health team that medically there is unfortunately little else to offer.  Patient do not feel because of his weakness will be able to undergo any further chemotherapy.  We did recommend hospice however they were somewhat hesitant, they are going to get palliative care out of [facility], he is going to be sent home with oxygen.  He is to use 2 L while lying down but up just 4 L with mobilization.  His son is respiratory therapist.  Prognosis is poor.  Family is aware.  Again see chart for full details of this visit.  Pulmonology consider bronchoscopy but this was a very high risk and patient was high risk to end up on the ventilator which would have been very difficult wean.    Follow-up oncology in 2 weeks    Home health declined the patient    Diet: As tolerated    Medications:  Albuterol home dose  Allopurinol 100 mg daily  Eliquis 5 mg twice daily  Cholecalciferol 1000 units daily  Tambocor 50 mg twice daily  Prozac 40 mg daily  Sliding scale insulin  Claritin 10 mg a day  Midodrine 10 mg 1/2 tablet 3 times a day  Asmanex 2 puffs every night  Singulair 10 mg every  night  Nitroglycerin 0.4 mg as needed  Omeprazole 40 mg tablet, 80 mg every morning  Zofran as needed  Oxycodone extended release 10 mg tablets, 2 tablets every 12 hours  Oxycodone short-acting 10 mg tablet 1-2 tabs every 4-6 hours as needed pain  Compazine as needed  Senna 2 tablets twice daily  Flomax 0.4 mg a day    Disposition: Home    45 min discharge

## 2019-09-06 ENCOUNTER — APPOINTMENT (OUTPATIENT)
Dept: PET IMAGING | Facility: HOSPITAL | Age: 69
End: 2019-09-06

## 2019-09-06 ENCOUNTER — READMISSION MANAGEMENT (OUTPATIENT)
Dept: CALL CENTER | Facility: HOSPITAL | Age: 69
End: 2019-09-06

## 2019-09-06 NOTE — OUTREACH NOTE
Prep Survey      Responses   Facility patient discharged from?  [facility]   Is patient eligible?  Yes   Discharge diagnosis  Stage IV metastatic pancreas cancer with assoc. interstitial lung disease and hypoxic resp. failure, sepsis d/t pneumonia, hypomagensemia, diabetes, persisitent nausea, intractable pain, chronic anemia, hyponatremia, constipation, urinary hesitancy, hx PAF on chronic anticoag., progressive functional decline   Does the patient have one of the following disease processes/diagnoses(primary or secondary)?  COPD/Pneumonia   Does the patient have Home health ordered?  Yes   What is the Home health agency?   Palliative Care order sent to [facility]   Is there a DME ordered?  Yes   What DME was ordered?  [facility] for home O2   Comments regarding appointments  See AVS   Prep survey completed?  Yes          Tresa Cuellar RN

## 2019-09-07 ENCOUNTER — READMISSION MANAGEMENT (OUTPATIENT)
Dept: CALL CENTER | Facility: HOSPITAL | Age: 69
End: 2019-09-07

## 2019-09-07 NOTE — OUTREACH NOTE
COPD/PN Week 1 Survey      Responses   Facility patient discharged from?  [facility]   Does the patient have one of the following disease processes/diagnoses(primary or secondary)?  COPD/Pneumonia   Is there a successful TCM telephone encounter documented?  No   Was the primary reason for admission:  Pneumonia   Week 1 attempt successful?  No   Unsuccessful attempts  Attempt 1          Judi Fritz, RN

## 2019-09-08 ENCOUNTER — READMISSION MANAGEMENT (OUTPATIENT)
Dept: CALL CENTER | Facility: HOSPITAL | Age: 69
End: 2019-09-08

## 2019-09-08 NOTE — OUTREACH NOTE
COPD/PN Week 1 Survey      Responses   Facility patient discharged from?  [facility]   Does the patient have one of the following disease processes/diagnoses(primary or secondary)?  COPD/Pneumonia   Is there a successful TCM telephone encounter documented?  No   Was the primary reason for admission:  Pneumonia   Week 1 attempt successful?  No   Unsuccessful attempts  Attempt 2          Judi Fritz, RN

## 2019-09-13 ENCOUNTER — HOSPITAL ENCOUNTER (OUTPATIENT)
Dept: PET IMAGING | Facility: HOSPITAL | Age: 69
Discharge: HOME | End: 2019-09-13
Admitting: INTERNAL MEDICINE

## 2019-09-13 ENCOUNTER — READMISSION MANAGEMENT (OUTPATIENT)
Dept: CALL CENTER | Facility: HOSPITAL | Age: 69
End: 2019-09-13

## 2019-09-13 DIAGNOSIS — C25.0 MALIGNANT NEOPLASM OF HEAD OF PANCREAS (HCC): ICD-10-CM

## 2019-09-13 PROCEDURE — 78815 PET IMAGE W/CT SKULL-THIGH: CPT

## 2019-09-13 PROCEDURE — A9552 F18 FDG: HCPCS | Performed by: INTERNAL MEDICINE

## 2019-09-13 PROCEDURE — 0 FLUDEOXYGLUCOSE F18 SOLUTION: Performed by: INTERNAL MEDICINE

## 2019-09-13 PROCEDURE — 78815 PET IMAGE W/CT SKULL-THIGH: CPT | Performed by: RADIOLOGY

## 2019-09-13 RX ADMIN — FLUDEOXYGLUCOSE F18 1 DOSE: 300 INJECTION INTRAVENOUS at 10:11

## 2019-09-13 NOTE — OUTREACH NOTE
COPD/PN Week 2 Survey      Responses   Facility patient discharged from?  [facility]   Does the patient have one of the following disease processes/diagnoses(primary or secondary)?  COPD/Pneumonia   Was the primary reason for admission:  Pneumonia   Week 2 attempt successful?  No   Unsuccessful attempts  Attempt 1          Judi Fritz, RN

## 2019-09-16 ENCOUNTER — READMISSION MANAGEMENT (OUTPATIENT)
Dept: CALL CENTER | Facility: HOSPITAL | Age: 69
End: 2019-09-16

## 2019-09-16 NOTE — OUTREACH NOTE
COPD/PN Week 2 Survey      Responses   Facility patient discharged from?  [facility]   Does the patient have one of the following disease processes/diagnoses(primary or secondary)?  COPD/Pneumonia   Was the primary reason for admission:  Pneumonia   Week 2 attempt successful?  No   Unsuccessful attempts  Attempt 2          Tresa Mejía RN

## 2019-09-20 ENCOUNTER — READMISSION MANAGEMENT (OUTPATIENT)
Dept: CALL CENTER | Facility: HOSPITAL | Age: 69
End: 2019-09-20

## 2019-09-20 NOTE — OUTREACH NOTE
COPD/PN Week 3 Survey      Responses   Facility patient discharged from?  [facility]   Does the patient have one of the following disease processes/diagnoses(primary or secondary)?  COPD/Pneumonia   Was the primary reason for admission:  Pneumonia   Week 3 attempt successful?  No   Unsuccessful attempts  Attempt 1          Daisy Pena RN

## 2019-09-23 ENCOUNTER — READMISSION MANAGEMENT (OUTPATIENT)
Dept: CALL CENTER | Facility: HOSPITAL | Age: 69
End: 2019-09-23

## 2019-09-23 NOTE — OUTREACH NOTE
COPD/PN Week 3 Survey      Responses   Facility patient discharged from?  [facility]   Does the patient have one of the following disease processes/diagnoses(primary or secondary)?  COPD/Pneumonia   Was the primary reason for admission:  Pneumonia   Week 3 attempt successful?  Yes   Call start time  1235   Call end time  1238   Discharge diagnosis  Stage IV metastatic pancreas cancer with assoc. interstitial lung disease and hypoxic resp. failure, sepsis d/t pneumonia, hypomagensemia, diabetes, persisitent nausea, intractable pain, chronic anemia, hyponatremia, constipation, urinary hesitancy, hx PAF on chronic anticoag., progressive functional decline   Is patient permission given to speak with other caregiver?  Yes   Person spoke with today (if not patient) and relationship  Emmytheo Corleys reviewed with patient/caregiver?  Yes   Is the patient having any side effects they believe may be caused by any medication additions or changes?  No   Does the patient have all medications ordered at discharge?  Yes   Is the patient taking all medications as directed (includes completed medication regime)?  Yes   Does the patient have a primary care provider?   Yes   Does the patient have an appointment with their PCP or pulmonologist within 7 days of discharge?  Yes   Has the patient kept scheduled appointments due by today?  Yes   What is the Home health agency?   Palliative Care order sent to [facility]   What DME was ordered?  [facility] for home O2   Psychosocial issues?  No   Did the patient receive a copy of their discharge instructions?  Yes   Nursing interventions  Reviewed instructions with patient   What is the patient's perception of their health status since discharge?  Improving   Nursing Interventions  Nurse provided patient education   Are the patient's immunizations up to date?   Yes   Nursing interventions  Educated on importance of maintaining up to date immunizations as advised by provider    Is the patient/caregiver able to teach back the hierarchy of who to call/visit for symptoms/problems? PCP, Specialist, Home health nurse, Urgent Care, ED, 911  Yes   Additional teach back comments  She says he is doing better, not as much coughing.  Still with O2, but doing better, appetite improved.   Is the patient/caregiver able to teach back signs and symptoms of worsening condition:  Fever/chills, Shortness of breath, Chest pain   Is the patient/caregiver able to teach back importance of completing antibiotic course of treatment?  Yes   Week 3 call completed?  Yes          Daisy Pena RN

## 2019-09-26 RX ORDER — OXYCODONE HCL 10 MG/1
20 TABLET, FILM COATED, EXTENDED RELEASE ORAL EVERY 12 HOURS SCHEDULED
Qty: 120 TABLET | Refills: 0 | Status: SHIPPED | OUTPATIENT
Start: 2019-09-26

## 2019-09-26 RX ORDER — OXYCODONE HYDROCHLORIDE 10 MG/1
TABLET ORAL
Qty: 150 TABLET | Refills: 0 | Status: SHIPPED | OUTPATIENT
Start: 2019-09-26

## 2019-10-02 ENCOUNTER — READMISSION MANAGEMENT (OUTPATIENT)
Dept: CALL CENTER | Facility: HOSPITAL | Age: 69
End: 2019-10-02

## 2019-10-02 NOTE — OUTREACH NOTE
COPD/PN Week 4 Survey      Responses   Facility patient discharged from?  [facility]   Does the patient have one of the following disease processes/diagnoses(primary or secondary)?  COPD/Pneumonia   Was the primary reason for admission:  Pneumonia   Week 4 attempt successful?  Yes   Call start time  1511   Call end time  1512   Discharge diagnosis  Stage IV metastatic pancreas cancer with assoc. interstitial lung disease and hypoxic resp. failure, sepsis d/t pneumonia, hypomagensemia, diabetes, persisitent nausea, intractable pain, chronic anemia, hyponatremia, constipation, urinary hesitancy, hx PAF on chronic anticoag., progressive functional decline   Is patient permission given to speak with other caregiver?  Yes   Person spoke with today (if not patient) and relationship  Emmy Spouse    Meds reviewed with patient/caregiver?  Yes   Is the patient having any side effects they believe may be caused by any medication additions or changes?  No   Is the patient taking all medications as directed (includes completed medication regime)?  Yes   Has the patient kept scheduled appointments due by today?  Yes   Comments  Seen MD.   Is the patient still receiving Home Health Services?  N/A   Psychosocial issues?  No   What is the patient's perception of their health status since discharge?  Improving   Nursing Interventions  Nurse provided patient education   Are the patient's immunizations up to date?   Yes   Is the patient/caregiver able to teach back the hierarchy of who to call/visit for symptoms/problems? PCP, Specialist, Home health nurse, Urgent Care, ED, 911  Yes   Is the patient/caregiver able to teach back signs and symptoms of worsening condition:  Fever/chills, Shortness of breath, Chest pain   Is the patient/caregiver able to teach back importance of completing antibiotic course of treatment?  Yes   Week 4 call completed?  Yes   Would the patient like one additional call?  No   Graduated  Yes   Wrap up additional  comments  Bad connection. Lost contact with spouse. He is doing well.          Marco Antonio Barker RN

## 2019-10-17 LAB
CYTO UR: NORMAL
LAB AP CASE REPORT: NORMAL
LAB AP CLINICAL INFORMATION: NORMAL
Lab: NORMAL
PATH REPORT.ADDENDUM SPEC: NORMAL
PATH REPORT.FINAL DX SPEC: NORMAL
PATH REPORT.GROSS SPEC: NORMAL

## 2020-04-21 NOTE — OUTREACH NOTE
Medical Week 3 Survey      Responses   Facility patient discharged from?  [facility]   Does the patient have one of the following disease processes/diagnoses(primary or secondary)?  Other   Week 3 attempt successful?  Yes   Call start time  1223   Call end time  1226   Discharge diagnosis  Febrile neutopenia   Meds reviewed with patient/caregiver?  Yes   Is the patient having any side effects they believe may be caused by any medication additions or changes?  No   Does the patient have all medications ordered at discharge?  Yes   Prescription comments  Alaperinal increased from 100 to 200mg   Is the patient taking all medications as directed (includes completed medication regime)?  Yes   Does the patient have a primary care provider?   Yes   Comments regarding PCP  Pt saw Dr. Mcdonald 05/09/19   Has the patient kept scheduled appointments due by today?  Yes   What is the Home health agency?   [facility]  vincent to visit   Psychosocial issues?  No   Did the patient receive a copy of their discharge instructions?  Yes   Nursing interventions  Reviewed instructions with patient   What is the patient's perception of their health status since discharge?  Improving   Is the patient/caregiver able to teach back signs and symptoms related to disease process for when to call PCP?  Yes   Is the patient/caregiver able to teach back signs and symptoms related to disease process for when to call 911?  Yes   Is the patient/caregiver able to teach back the hierarchy of who to call/visit for symptoms/problems? PCP, Specialist, Home health nurse, Urgent Care, ED, 911  Yes   Additional teach back comments  Pt states he is doing good and denies questions at this time.   Week 3 Call Completed?  Yes          Nae Ibanez RN  
no fever  no eye discharge  no cp  no shortness of breath  no vomiting  no rash  no new weakness arm/leg  no oppositional behavior  no nuchal rigidity  no laceration  +back pain

## 2020-09-11 NOTE — PLAN OF CARE
Problem: Patient Care Overview  Goal: Plan of Care Review  Outcome: Ongoing (interventions implemented as appropriate)   09/03/19 6078   Coping/Psychosocial   Plan of Care Reviewed With patient   Plan of Care Review   Progress no change          Referring Physician (Optional): Aruna

## 2021-02-16 NOTE — TELEPHONE ENCOUNTER
Received phone call from Mateo (PTA at [facility]). She states they are required to report any patient fall so she wanted to make Dr Alves aware that Mr Phillips had a fall in the bathroom resulting in a small skin tear to his left arm, above the elbow. Area was bandaged by his wife and no other injuries were noted.    no cold intolerance/no heat intolerance

## 2025-03-03 NOTE — DISCHARGE PLACEMENT REQUEST
"Jose Ya (69 y.o. Male)     Date of Birth Social Security Number Address Home Phone MRN    1950  [id number]  [place] [address] [phone] [id number]    Yarsanism Marital Status          Other        Admission Date Admission Type Admitting Provider Attending Provider Department, Room/Bed    8/6/19 Emergency Aaron Rao MD Heinss, Karl F, MD [address], 3338/1P    Discharge Date Discharge Disposition Discharge Destination         Home or Self Care              Attending Provider:  Aaron Rao MD    Allergies:  Chlorhexidine, Contrast Dye, Fentanyl    Isolation:  None   Infection:  None   Code Status:  CPR    Ht:  182.9 cm (72\")   Wt:  73.5 kg (162 lb 1.6 oz)    Admission Cmt:  None   Principal Problem:  None                Active Insurance as of 8/6/2019     Primary Coverage     Payor Plan Insurance Group Employer/Plan Group    MEDICARE MEDICARE A & B      Payor Plan Address Payor Plan Phone Number Payor Plan Fax Number Effective Dates    [address] [phone]  4/1/2005 - None Entered    [facility] [id number]       Subscriber Name Subscriber Birth Date Member ID       JOSE YA 1950 [id number]           Secondary Coverage     Payor Plan Insurance Group Employer/Plan Group    [employer] COMMERCIAL GEHA - ASA [id number]     Payor Plan Address Payor Plan Phone Number Payor Plan Fax Number Effective Dates    [address]   6/16/2016 - None Entered    [facility] [id number]       Subscriber Name Subscriber Birth Date Member ID       JOSE YA 1950 [id number]                 Emergency Contacts      (Rel.) Home Phone Work Phone Mobile Phone    Emmy Ya (Spouse) [phone] -- [phone]    Carlos Ya (Son) -- -- [phone]    Richard Ya (Son) -- -- [phone]        [address] [address]  Phone:  [phone]  Fax:          Patient:     Jose Ya MRN:  [id number]   [id number]  [place] [address] :  1950  SSN:    Phone: [phone] Sex:  M      INSURANCE PAYOR PLAN GROUP # SUBSCRIBER ID   Primary:  Secondary:    MEDICARE  [employer]ET[employer] COMMERCIAL [id number]  [id number]    [id number]  [id number]   Admitting Diagnosis: Sepsis, due to unspecified organism (CMS/HCC) [A41.9]  Order Date:  Sep 5, 2019         Discharge Instructions       (Order ID: [id number])     Diagnosis:         Priority:  Routine Expected Date:   Expiration Date:        Interval:   Count:    Comments: [facility] [place] palliative care consult        Specimen Type:   Specimen Source:   Specimen Taken Date:   Specimen Taken Time:                   Authorizing Provider:Aaron Rao MD  Authorizing Provider's NPI: [id number]  Order Entered By: Aaron Rao MD 2019 12:02 PM     Electronically signed by: Aaron Rao MD 2019 12:02 PM             History & Physical      GagandeepNickolas partida MD at 2019  5:13 PM            [facility] Medicine Services  History & Physical          Patient Identification:  Name:  Todd Phillips  Age:  69 y.o.  Sex:  male  :  1950  MRN:  [id number]   Visit Number:  [id number]  Primary Care Physician:  Adiel Denney MD    I have seen the patient in conjunction with VIMAL Pond and I agree with the following statements:     Subjective     Chief complaint: fever    History of presenting illness:    Mr. Phillips is a 69 year old male patient with metastatic pancreatic cancer who presented to [facility] ED on 2019. He states he was running a fever over the weekend around 100. His wife states last night it latoya to 100.8 and was up to 101.8 by this am. His most recent chemo was last Tuesday. He denies any cough, vomiting, no diarrhea, denies sore throat, no upper respiratory symptoms. He denies any urinary symptoms. He did a round of Levaquin around the first of last week, his wife states Dr. Alves thought he had a  "\"touch of PNA at that time\". He has had chills and diaphoresis. He reports worsening epigastric abdominal pain over the weekend as well, despite his oncologist increasing his pain medication regimen. His last hospital admission was April 21-25 2019 at [facility] for possible bacterial enteritis seen on CT.     His work up in the ED showed initial troponin T of <0.010, glucose 178, 133, potassium 4.2, chloride 97, creatinine 1.09, alkaline phosphatase 224, BUN 13, total protein 5.8, albumin 2.1, normal LFTs, CRP 5.63, INR 1.29, WBC 19.28, H&H 8.2 and 26.1, platelet count 131.  CT of the abdomen pelvis without contrast showed multiple level compression of the vertebral bodies that are probably chronic, no masses, free air or free fluid, no wall thickening of the GI tract.  CT of the chest showed a 5 mm left upper lobe nodule, and a right lower lobe nodule measuring almost 1 cm, also noted to have improved aeration of the bases but persistent prominent interstitial markings and some groundglass attenuation, per radiology reading.     His medical history is significant for Pancreatic Cancer. He is currently undergoing chemo therapy, he did have a whipple done on December 31 2018 at [facility]. He also had his GB removed on August 10 2018. He denies any hx of heart stents, no strokes or blood clots. He does have atrial fibrillation on chronic oral anticoagulation. He denies any use of tobacco or ETOH. He is a former strip .       ---------------------------------------------------------------------------------------------------------------------   Review of Systems   Constitutional: Positive for activity change, appetite change, chills, diaphoresis and fever. Negative for fatigue.   HENT: Negative for congestion, postnasal drip, rhinorrhea, sinus pressure, sinus pain and sore throat.    Eyes: Negative for photophobia, pain, discharge, redness, itching and visual disturbance.   Respiratory: Negative for cough, " shortness of breath and wheezing.    Cardiovascular: Negative for chest pain and leg swelling.   Gastrointestinal: Positive for abdominal pain (epigastric, worsening over the weekend) and nausea. Negative for abdominal distention, blood in stool, constipation, diarrhea and vomiting.             Endocrine: Negative for cold intolerance, heat intolerance, polydipsia, polyphagia and polyuria.   Genitourinary: Negative for difficulty urinating, dysuria, flank pain and hematuria.   Musculoskeletal: Negative for arthralgias, back pain, joint swelling, myalgias, neck pain and neck stiffness.   Skin: Negative for color change, pallor, rash and wound.   Allergic/Immunologic: Positive for immunocompromised state. Negative for environmental allergies and food allergies.   Neurological: Positive for weakness (generalized). Negative for dizziness, tremors, seizures, syncope, light-headedness, numbness and headaches.   Hematological: Negative for adenopathy. Does not bruise/bleed easily.   Psychiatric/Behavioral: Negative for agitation, behavioral problems and confusion.      ---------------------------------------------------------------------------------------------------------------------   Past Medical History:   Diagnosis Date   • Antral gastritis    • Asthma    • Atrial fibrillation (CMS/HCC)     treated with oral blood thinner   • Chest pain    • COPD (chronic obstructive pulmonary disease) (CMS/[facility])    • Coronary artery disease     no stents   • Diabetes mellitus (CMS/[facility])     type 2 - checks sugar 4 times per day    • Duodenitis    • Elevated cholesterol    • Emphysema of lung (CMS/[facility])    • Gallbladder disease     removed    • GERD (gastroesophageal reflux disease)    • Hard to intubate     busted lip last time   • Hyperlipidemia    • Hypertension    • Jaundice    • Kidney stone    • Kidney stones     had lithotripsy and passed 36 kidney stones as well as had one surgically removed.   • Malignant neoplasm of head of  [FreeTextEntry1] : Well appearing female is here for gyn exam; reg menses, no complaints. , uses condoms for contraception, states she is not interested in future pregnancy, but is happy with condom use only. She denies any acute gyn complaints. She reports mother had hx of "hyperplasia" she thinks uterine around age 35, but is unsure of details, recommend patient see if she can get precise family history in the event she may qualify for hereditary genetic testing to r/o genetic predisposition to cancer.  - PAP done - Healthy diet and routine exercise recommended.  - Self breast awareness recommended - Safe sex/condom use advised  RTO in 1 year for routine gynecological exam and PRN pancreas (CMS/HCC) 9/5/2018   • Nausea    • Obstructive sleep apnea on CPAP     compliant with machine    • Palpitations    • Pneumonia 08/2018   • Reflux esophagitis    • Renal failure     stage 3 kidney disease   • Sepsis (CMS/HCC)      Past Surgical History:   Procedure Laterality Date   • BILE DUCT STENT PLACEMENT      [facility] 2 weeks ago    • CARDIAC CATHETERIZATION  11/01/1999   • CARDIOVASCULAR STRESS TEST  09/2012   • CHOLECYSTECTOMY N/A 8/10/2018    Procedure: CHOLECYSTECTOMY LAPAROSCOPIC;  Surgeon: Ronak Downs MD;  Location:  [facility];  Service: General   • COLONOSCOPY     • CYSTOSCOPY BLADDER STONE LITHOTRIPSY     • ECHO - CONVERTED  09/2012   • ERCP N/A 8/14/2018    Procedure: ENDOSCOPIC RETROGRADE CHOLANGIOPANCREATOGRAPHY WITH PAPILLOTOMY;  Surgeon: Scot Su MD;  Location:  [facility];  Service: Gastroenterology   • ERCP N/A 10/1/2018    Procedure: ENDOSCOPIC RETROGRADE CHOLANGIOPANCREATOGRAPHY;  Surgeon: Jefry Luna MD;  Location:  [facility];  Service: Gastroenterology   • KIDNEY STONE SURGERY     • KIDNEY STONE SURGERY      open abdominal surgery   • PORTACATH PLACEMENT Right 10/23/2018    Procedure: INSERTION OF PORTACATH;  Surgeon: Ronak Downs MD;  Location:  [facility];  Service: General   • TONSILLECTOMY     • UPPER GASTROINTESTINAL ENDOSCOPY  08/30/2012   • WHIPPLE PROCEDURE N/A 12/31/2018    Procedure: WHIPPLE PROCEDURE, BIOPSY OF LIVER, PORTAL VEIN RESECTION AND REPAIR, G/J TUBE PLACEMENT;  Surgeon: Miquel Brody MD;  Location:  [facility];  Service: General     Family History   Problem Relation Age of Onset   • Heart attack Mother    • Heart disease Mother    • Stroke Mother    • Kidney disease Mother    • Heart failure Mother    • Lung disease Father    • Tuberculosis Father    • Diabetes Sister    • Heart attack Brother    • Heart failure Brother    • Heart disease Brother    • Diabetes Sister    • No Known Problems  Brother    • No Known Problems Brother      Social History     Socioeconomic History   • Marital status:      Spouse name: Not on file   • Number of children: Not on file   • Years of education: Not on file   • Highest education level: Not on file   Tobacco Use   • Smoking status: Never Smoker   • Smokeless tobacco: Never Used   Substance and Sexual Activity   • Alcohol use: No   • Drug use: No   • Sexual activity: Defer     Partners: Female   Social History Narrative    He is  and lives alone with his wife although they have 3 children together who all live close by. He is retired from the [employer] and was exposed to Agent Orange during [place]. He is a lifelong nonsmoker.      ---------------------------------------------------------------------------------------------------------------------   Allergies:  Chlorhexidine; Contrast dye; and Fentanyl  ---------------------------------------------------------------------------------------------------------------------     Medications below are reported home medications pulling from within the system; at this time, these medications have not been reconciled unless otherwise specified and are in the verification process for further verifcation as current home medications.    Prior to Admission Medications     Prescriptions Last Dose Informant Patient Reported? Taking?    albuterol (PROVENTIL HFA;VENTOLIN HFA) 108 (90 BASE) MCG/ACT inhaler  Spouse/Significant Other Yes No    Inhale 2 puffs Every 4 (Four) Hours As Needed for Wheezing or Shortness of Air.    albuterol (PROVENTIL) (2.5 MG/3ML) 0.083% nebulizer solution  Spouse/Significant Other Yes No    Take 2.5 mg by nebulization Every 6 (Six) Hours As Needed for Wheezing or Shortness of Air.    allopurinol (ZYLOPRIM) 100 MG tablet   No No    Take 1 tablet by mouth Daily.    apixaban (ELIQUIS) 5 MG tablet tablet   No No    Take 1 tablet by mouth Every 12 (Twelve) Hours.    benzonatate (TESSALON PERLES) 100  MG capsule   No No    Take 2 capsules by mouth 3 (Three) Times a Day As Needed for Cough.    cholecalciferol (VITAMIN D3) 1000 units tablet  Spouse/Significant Other Yes No    Take 1,000 Units by mouth Daily.    dexamethasone (DECADRON) 4 MG tablet   No No    Take 2 tablets by mouth in the morning on days 2, 3, and 4 after chemo.    flecainide (TAMBOCOR) 50 MG tablet  Spouse/Significant Other Yes No    Take 50 mg by mouth Every 12 (Twelve) Hours.    FLUoxetine (PROzac) 20 MG capsule  Spouse/Significant Other Yes No    Take 20 mg by mouth 2 (Two) Times a Day.    insulin lispro (humaLOG) 100 UNIT/ML injection   Yes No    Inject  under the skin into the appropriate area as directed 3 (Three) Times a Day As Needed (before meals prn (2 units if > 150-199, 4 units if > 199)).    lactobacillus acidophilus (RISAQUAD) capsule capsule   No No    Take 1 capsule by mouth Daily.    mometasone (ASMANEX) 220 MCG/INH inhaler  Spouse/Significant Other Yes No    Inhale 2 puffs Every Night.    montelukast (SINGULAIR) 10 MG tablet  Spouse/Significant Other Yes No    Take 10 mg by mouth Every Night.    nitroglycerin (NITROSTAT) 0.4 MG SL tablet  Spouse/Significant Other Yes No    Place 0.4 mg under the tongue as needed for chest pain.    omeprazole (priLOSEC) 40 MG capsule   Yes No    Take 40 mg by mouth Daily.    ondansetron (ZOFRAN) 8 MG tablet   No No    Take 1 tablet by mouth Every 8 (Eight) Hours As Needed for Nausea or Vomiting.    oxyCODONE (oxyCONTIN) 10 MG 12 hr tablet   No No    Take 2 tablets by mouth Every 12 (Twelve) Hours.    oxyCODONE (ROXICODONE) 10 MG tablet   No No    Take 1-2 tabs every 4-6 hours as needed for pain    pancrelipase, Lip-Prot-Amyl, (CREON) 90111 units capsule delayed-release particles capsule   Yes No    Take 12,000 units of lipase by mouth 3 (Three) Times a Day With Meals.    polyethylene glycol (MIRALAX) packet  Spouse/Significant Other Yes No    Take 17 g by mouth Daily As Needed.    prochlorperazine  (COMPAZINE) 10 MG tablet   No No    Take 1 tablet by mouth Every 6 (Six) Hours As Needed for Nausea or Vomiting.    promethazine (PHENERGAN) 12.5 MG tablet   No No    Take 1 tablet by mouth Every 6 (Six) Hours As Needed for Nausea or Vomiting.    promethazine (PHENERGAN) 25 MG suppository   No No    Insert 1 suppository into the rectum Every 6 (Six) Hours As Needed for Nausea or Vomiting.    rifaximin (XIFAXAN) 550 MG tablet  Self Yes No    Take 550 mg by mouth 2 (Two) Times a Day.    senna-docusate (SENNA-S) 8.6-50 MG per tablet   No No    Take 2 tablets by mouth 2 (Two) Times a Day.    Tiotropium Bromide-Olodaterol 2.5-2.5 MCG/ACT aerosol solution  Spouse/Significant Other Yes No    Inhale 2 puffs Daily.        Hospital Scheduled Meds:       sodium chloride 125 mL/hr     ---------------------------------------------------------------------------------------------------------------------   Objective       Vital Signs:  Temp:  [98.9 °F (37.2 °C)-99.4 °F (37.4 °C)] 98.9 °F (37.2 °C)  Heart Rate:  [] 88  Resp:  [18] 18  BP: ()/(65-88) 120/76      08/06/19  1130   Weight: 73 kg (161 lb)     Body mass index is 21.84 kg/m².  ---------------------------------------------------------------------------------------------------------------------       Physical Exam    Physical Exam:  Constitutional: Elderly gentleman, chronically ill appearing, no acute distress   HENT:  Head: Normocephalic and atraumatic.  Mouth:  Moist mucous membranes.    Eyes:  Conjunctivae and EOM are normal.  Pupils are equal, round, and reactive to light.  No scleral icterus.  Neck:  Neck supple.  No JVD present.    Cardiovascular:  Normal rate, regular rhythm, with no murmur.  Pulmonary/Chest:  No respiratory distress, no wheezes, no crackles, with normal breath sounds and good air movement. Port-a-cath noted right upper chest wall.   Abdominal:  Soft.  Bowel sounds are present.  No distension. Tenderness to palpation epigastric region  with mild guarding.  Musculoskeletal:  No edema, no tenderness, and no deformity.  No red or swollen joints anywhere.    Neurological:  Alert and oriented to person, place, and time.  No cranial nerve deficit.  No tongue deviation.  No facial droop.  No slurred speech. No gross focal deficits appreciated.  Skin:  Skin is warm and dry.  No rash noted.  No pallor.   Psychiatric:  Normal mood and affect.  Behavior is normal.  Judgment and thought content normal.   Peripheral vascular:  No edema and strong pulses on all 4 extremities.  ---------------------------------------------------------------------------------------------------------------------  EKG:  Sinus tachycardia, , QTc 451, no overt ST changes appreciated.        ---------------------------------------------------------------------------------------------------------------------   Results from last 7 days   Lab Units 08/06/19  1211 08/06/19  1205   CRP mg/dL 5.63*  --    LACTATE mmol/L  --  1.4   WBC 10*3/mm3 19.28*  --    HEMOGLOBIN g/dL 8.2*  --    HEMATOCRIT % 26.1*  --    MCV fL 90.3  --    MCHC g/dL 31.4*  --    PLATELETS 10*3/mm3 131*  --    INR  1.29*  --          Results from last 7 days   Lab Units 08/06/19  1211   SODIUM mmol/L 133*   POTASSIUM mmol/L 4.2   MAGNESIUM mg/dL 1.4*   CHLORIDE mmol/L 97*   CO2 mmol/L 24.6   BUN mg/dL 13   CREATININE mg/dL 1.09   EGFR IF NONAFRICN AM mL/min/1.73 67   CALCIUM mg/dL 8.6   GLUCOSE mg/dL 178*   ALBUMIN g/dL 2.81*   BILIRUBIN mg/dL 0.4   ALK PHOS U/L 224*   AST (SGOT) U/L 14   ALT (SGPT) U/L 15   Estimated Creatinine Clearance: 64.2 mL/min (by C-G formula based on SCr of 1.09 mg/dL).  No results found for: AMMONIA  Results from last 7 days   Lab Units 08/06/19  1211   TROPONIN T ng/mL <0.010         Lab Results   Component Value Date    HGBA1C 5.80 (H) 04/21/2019     Lab Results   Component Value Date    TSH 1.502 11/18/2018    FREET4 1.14 10/30/2018     No results found for: PREGTESTUR, PREGSERUM,  HCG, HCGQUANT  Pain Management Panel     Pain Management Panel Latest Ref Rng & Units 10/16/2018 9/12/2018    CREATININE UR mg/dL 208.8 -    AMPHETAMINES SCREEN, URINE Negative - Negative    BARBITURATES SCREEN Negative - Negative    BENZODIAZEPINE SCREEN, URINE Negative - Negative    BUPRENORPHINEUR Negative - Negative    COCAINE SCREEN, URINE Negative - Negative    METHADONE SCREEN, URINE Negative - Negative                        ---------------------------------------------------------------------------------------------------------------------  Imaging Results (last 7 days)     Procedure Component Value Units Date/Time    CT Abdomen Pelvis Without Contrast [[id number]] Collected:  08/06/19 1334     Updated:  08/06/19 1338    Narrative:          CT ABDOMEN PELVIS WO CONTRAST-      TECHNIQUE: Multiple axial CT images were obtained from lung bases  through pubic symphysis without administration of IV contrast.  Reformatted images in the coronal and/or sagittal plane(s) were  generated from the axial data set to facilitate diagnostic accuracy  and/or surgical planning.  Oral Contrast:NONE.     Radiation dose reduction techniques were utilized per ALARA protocol.  Automated exposure control was initiated through either or [employer]Meet[employer] or  D[employer] software packages by  protocol.             Clinical information  Sepsis      Comparison  NONE.     Findings  LOWER THORAX: Clear. No effusions.     ABDOMEN:        LIVER: Homogeneous. No focal hepatic mass or ductal dilatation.        GALLBLADDER: No dilation or stone identified.        PANCREAS: Unremarkable. No mass or ductal dilatation.        SPLEEN: Homogeneous. No splenomegaly.        ADRENALS: No mass.        KIDNEYS: No mass. No obstructive uropathy.  No evidence of  urolithiasis.        GI TRACT: Non-dilated. No definite wall thickening.        PERITONEUM: No free air. No free fluid or loculated fluid  collections.        MESENTERY: Unremarkable.         LYMPH NODES: No lymphadenopathy.        VASCULATURE: No evidence of aneurysm.        ABDOMINAL WALL: No focal hernia or mass.           OTHER: None.     PELVIS:        BLADDER: No focal mass or significant wall thickening        REPRODUCTIVE: Unremarkable as visualized.        APPENDIX: Nondistended. No surrounding inflammation.     BONES: MULTIPLE LEVEL COMPRESSION OF THE VERTEBRAL BODIES THAT ARE  PROBABLY CHRONIC.       Impression:       Impression:  1. Unremarkable exam.  No source for the patient symptoms.  2. Other incidental/non-acute findings as above.     This report was finalized on 8/6/2019 1:35 PM by Dr. Helio Gleason MD.       CT Chest Without Contrast [[id number]] Collected:  08/06/19 1332     Updated:  08/06/19 1336    Narrative:       CT CHEST WO CONTRAST-      TECHNIQUE: Multiple axial CT images were obtained from lung apex through  upper abdomen without administration of IV contrast. Reformatted images  in the coronal and/or sagittal plane(s) were generated from the axial  data set to facilitate diagnostic accuracy and/or surgical planning.  Oral Contrast:NONE.     Radiation dose reduction techniques were utilized per ALARA protocol.  Automated exposure control was initiated through either or Gogo or  DoseRight software packages by  protocol.             Clinical information  Sepsis      Comparison  Comparison CT performed on 07/18/2019.     Findings  LUNGS: 5 MM LEFT UPPER LOBE PULMONARY NODULE. SUBPLEURAL PULMONARY  NODULE RIGHT LOWER LOBE MEASURING NEARLY 1 CM AND APPEARING GROSSLY  STABLE. THE LUNGS ARE VERY EMPHYSEMATOUS.     HEART: Unremarkable.     PERICARDIUM: No effusion.     MEDIASTINUM: No masses. No enlarged lymph nodes.  No fluid collections.     PLEURA: No pleural effusion. No pleural mass or abnormal calcification.     MAJOR AIRWAYS: Clear. No intrinsic mass.     VASCULATURE: No evidence of aneurysm.     VISUALIZED UPPER ABDOMEN:        LIVER: Homogeneous. No focal  hepatic mass or ductal dilatation.        SPLEEN: Homogeneous. No splenomegaly.        ADRENALS: No mass.        KIDNEYS: No mass. No obstructive uropathy.  No evidence of  urolithiasis.        GI TRACT: Non-dilated. No definite wall thickening.        PERITONEUM: No free air. No free fluid or loculated fluid  collections.           ABDOMINAL WALL: No focal hernia or mass.           OTHER: None.     BONES: CHRONIC APPEARING COMPRESSION DEFORMITY OF T12 VERTEBRAL BODY.       Impression:       Impression:  Some improved aeration of the bases but with persistent prominent  interstitial markings and some groundglass attenuation.     This report was finalized on 8/6/2019 1:34 PM by Dr. Helio Gleason MD.       XR Chest 1 View [[id number]] Collected:  08/06/19 1306     Updated:  08/06/19 1309    Narrative:       EXAMINATION:  XR CHEST 1 VW-      CLINICAL INDICATION:     chest pain, fever, hx of pancreatic cancer, on  chemo     TECHNIQUE:  XR CHEST 1 VW-       COMPARISON: 04/21/2019      FINDINGS:   Right Port-A-Cath with tip in SVC. Patchy opacities in the bases again  noted.   Heart size is stable.   No pneumothorax.   No pleural effusion.   Bony and soft tissue structures are unremarkable.          Impression:       Stable radiographic appearance of the chest.     This report was finalized on 8/6/2019 1:07 PM by Dr. Helio Gleason MD.             Cultures: blood and urine cultures collected in the ED     ---------------------------------------------------------------------------------------------------------------------  Assessment / Plan       Assessment and Plan:     -Severe sepsis without significant source of infection (leukocytosis, Hypotension that responded to IV fluid bolus, tachycardia, elevated CRP, fever): no significant source of infection at this time, but given history of pancreatic cancer and worsening epigastric pain, intra-abdominal source is high on differential. Granted, patient received steroids  immediately following chemo, as well as neulasta, which could both be contributing to rise in WBC. CRP could simply be up from underlying malignancy, and fever could be tumor-related as well. Nevertheless, patient started on Invanz and vancomycin, infectious disease consulted, await blood and urine culture preliminary reports, repeat labs in the a.m.    -Hypomagnesemia: magnesium is 1.4, replacement protocol ordered.     -Normocytic Anemia: H/H is 8.2/26.1, at baseline, monitor.     -Mild thrombocytopenia:PLT count 131, continue to monitor    -pulmonary nodules, 5 mm, and 1cm: will need further evaluation and monitoring by hem/onc given his current dx of pancreatic cancer. No nodules noted on previous scans.      -Pancreatic Cancer, metastatic: currently receiving chemo and followed by Dr. Alves, hem/onc consulted.     -Paroxysmal Atrial Fibrillation on chronic oral anticoagulation:on eliquis for stroke prevention, rate controlled, resume meds including eliquis and flecainide. QTc acceptable at 451.     -CKD stage III: Creatinine is 1.09 at baseline, continue to monitor, gently hydrate given reports of nausea and poor PO intake, repeat labs in the a.m.    -COPD: Not felt to be in acute exacerbation, monitor respiratory status, continue home inhalers/nebs.    -DM Type 2, insulin dependent: A1c ordered, hypoglycemia protocol initiated, accu checks ac/hs and prn, diabetic diet, low dose sliding scale insulin ordered.     -Epigastric Discomfort: with nausea radiating into the chest and occurs intermittently over the last 3-4 days, trend troponin, h.pylori ordered. No acute abnormality noted on CT abdomen or chest. Continue home protonix. Continue home pain medication regimen with IV morphine available for breakthrough.    Activity: Up with assistance  Precautions: Falls  DVT prophylaxis: anticoagulated on eliquis  GI prophylaxis: Protonix 40 IV daily    Code status: Full    Plan of care discussed with patient, his wife  at bedside, and his RN Freya who was present during my interview and exam on the PCU.    Patient is high risk for the following reasons: Severe sepsis without significant etiology at this time, metastatic pancreatic cancer    VIMAL Cuellar  08/06/19  5:13 PM  ---------------------------------------------------------------------------------------------------------------------   * I have seen the patient in conjunction with VIMAL Pond, and have amended her note to reflect my own findings, assessment and plan.    Electronically signed by Nickolas Donis MD at 8/6/2019  8:57 PM       ICU Vital Signs     Row Name 09/05/19 0728 09/05/19 0717 09/05/19 0638 09/05/19 0300 09/05/19 0054       Vitals    Temp  --  --  97.5 °F (36.4 °C)  97.9 °F (36.6 °C)  --    Temp src  --  --  Oral  Oral  --    Pulse  95  86  83  97  93    Heart Rate Source  --  --  Monitor  Monitor  Monitor    Resp  18  18  16  14  18    Resp Rate Source  --  --  Visual  Visual  Visual    BP  --  --  132/77  129/79  --    BP Location  --  --  Left arm  Left arm  --    BP Method  --  --  Automatic  Automatic  --    Patient Position  --  --  Lying  Lying  --       Oxygen Therapy    SpO2  99 %  99 %  99 %  97 %  98 %    Pulse Oximetry Type  --  --  --  Continuous  Continuous    Device (Oxygen Therapy)  --  nasal cannula  nasal cannula  nasal cannula  nasal cannula;humidified    Flow (L/min)  --  3  --  3  3    Row Name 09/05/19 0040 09/04/19 2300 09/04/19 1956 09/04/19 1955 09/04/19 1945       Vitals    Temp  --  98.2 °F (36.8 °C)  --  --  --    Temp src  --  Oral  --  --  --    Pulse  93  89  95  --  101    Heart Rate Source  Monitor  Monitor  Monitor  --  Monitor    Resp  18  18  20  --  20    Resp Rate Source  Visual  Visual  Visual  --  Visual    BP  --  132/80  --  --  --    BP Location  --  Left arm  --  --  --    BP Method  --  Automatic  --  --  --    Patient Position  --  Lying  --  --  --       Oxygen Therapy    SpO2   96 %  98 %  95 %  --  94 %    Pulse Oximetry Type  Continuous  Continuous  Continuous  --  Continuous    Device (Oxygen Therapy)  nasal cannula;humidified  nasal cannula  nasal cannula;humidified  nasal cannula  nasal cannula;humidified    Flow (L/min)  3  3  3  3  3    Row Name 09/04/19 1900 09/04/19 1523 09/04/19 1351 09/04/19 1344          Vitals    Temp  97.6 °F (36.4 °C)  97.8 °F (36.6 °C)  --  --     Temp src  Oral  Oral  --  --     Pulse  97  99  93  94     Heart Rate Source  Monitor  Monitor  --  --     Resp  16  20  20  20     Resp Rate Source  Visual  Visual  --  --     BP  129/82  137/89  --  --     BP Location  Left arm  Left arm  --  --     BP Method  Automatic  Automatic  --  --     Patient Position  Lying  Lying  --  --        Oxygen Therapy    SpO2  98 %  98 %  99 %  98 %     Pulse Oximetry Type  Continuous  --  --  --     Device (Oxygen Therapy)  nasal cannula  nasal cannula  --  nasal cannula     Flow (L/min)  3  --  --  3         Clinic-Administered Medications       Dose Frequency Start End    sodium chloride 0.9 % infusion  - ADS Override Pull   10/23/2018     Notes to Pharmacy: Created by cabinet override    sodium chloride 0.9 % infusion  - ADS Override Pull   10/30/2018     Notes to Pharmacy: Created by cabinet override      Hospital Medications (active)       Dose Frequency Start End    acetaminophen (TYLENOL) tablet 650 mg 650 mg Every 6 Hours PRN 8/9/2019     Sig - Route: Take 2 tablets by mouth Every 6 (Six) Hours As Needed for Mild Pain . - Oral    acetaminophen (TYLENOL) tablet 650 mg 650 mg Every 6 Hours PRN 8/12/2019     Sig - Route: Take 2 tablets by mouth Every 6 (Six) Hours As Needed for Mild Pain  (allergy to contrast). - Oral    allopurinol (ZYLOPRIM) tablet 100 mg 100 mg 2 Times Daily 8/6/2019     Sig - Route: Take 1 tablet by mouth 2 (Two) Times a Day. - Oral    apixaban (ELIQUIS) tablet 5 mg 5 mg Every 12 Hours Scheduled 8/6/2019     Sig - Route: Take 1 tablet by mouth Every  12 (Twelve) Hours. - Oral    bisacodyl (DULCOLAX) suppository 10 mg 10 mg Once 9/4/2019 9/4/2019    Sig - Route: Insert 1 suppository into the rectum 1 (One) Time. - Rectal    budesonide (PULMICORT) nebulizer solution 0.5 mg 0.5 mg 2 Times Daily - RT 8/6/2019     Sig - Route: Take 2 mL by nebulization 2 (Two) Times a Day. - Nebulization    cefepime (MAXIPIME) 2 g/100 mL 0.9% NS (mbp) 2 g Every 8 Hours 8/28/2019 9/4/2019    Sig - Route: Infuse 100 mL into a venous catheter Every 8 (Eight) Hours. - Intravenous    cetirizine (zyrTEC) tablet 5 mg 5 mg Daily 8/7/2019     Sig - Route: Take 0.5 tablets by mouth Daily. - Oral    cholecalciferol (VITAMIN D3) tablet 1,000 Units 1,000 Units Daily 8/7/2019     Sig - Route: Take 2.5 tablets by mouth Daily. - Oral    dextrose (D50W) 25 g/ 50mL Intravenous Solution 25 g 25 g Every 15 Minutes PRN 8/6/2019     Sig - Route: Infuse 50 mL into a venous catheter Every 15 (Fifteen) Minutes As Needed for Low Blood Sugar (Blood Sugar Less Than 70). - Intravenous    dextrose (GLUTOSE) oral gel 15 g 15 g Every 15 Minutes PRN 8/6/2019     Sig - Route: Take 15 application by mouth Every 15 (Fifteen) Minutes As Needed for Low Blood Sugar (Blood sugar less than 70). - Oral    diphenhydrAMINE (BENADRYL) capsule 25 mg 25 mg Every 6 Hours PRN 8/12/2019     Sig - Route: Take 1 capsule by mouth Every 6 (Six) Hours As Needed for Itching or Allergies. - Oral    flecainide (TAMBOCOR) tablet 50 mg 50 mg Every 12 Hours 8/6/2019     Sig - Route: Take 1 tablet by mouth Every 12 (Twelve) Hours. - Oral    FLUoxetine (PROzac) capsule 40 mg 40 mg Every Morning 8/7/2019     Sig - Route: Take 2 capsules by mouth Every Morning. - Oral    glucagon (human recombinant) (GLUCAGEN DIAGNOSTIC) injection 1 mg 1 mg As Needed 8/6/2019     Sig - Route: Inject 1 mg under the skin into the appropriate area as directed As Needed for Low Blood Sugar (Blood Glucose Less Than 70). - Subcutaneous    heparin flush (porcine) 100  "UNIT/ML injection 500 Units 5 mL As Needed 8/19/2019     Sig - Route: Infuse 5 mL into a venous catheter As Needed for Line Care (Prior to De-Accessing Port). - Intravenous    insulin aspart (novoLOG) injection 0-7 Units 0-7 Units 4 Times Daily Before Meals & Nightly 8/6/2019     Sig - Route: Inject 0-7 Units under the skin into the appropriate area as directed 4 (Four) Times a Day Before Meals & at Bedtime. - Subcutaneous    ipratropium-albuterol (DUO-NEB) nebulizer solution 3 mL 3 mL Every 6 Hours - RT 8/29/2019     Sig - Route: Take 3 mL by nebulization Every 6 (Six) Hours. - Nebulization    lactulose (CHRONULAC) 10 GM/15ML solution 20 g 20 g Daily 9/2/2019     Sig - Route: Take 30 mL by mouth Daily. - Oral    lactulose (CHRONULAC) 10 GM/15ML solution 30 g 30 g Once 9/3/2019     Sig - Route: Take 45 mL by mouth 1 (One) Time. - Oral    Magnesium Sulfate 2 gram / 50mL Infusion (GIVE X 3 BAGS TO EQUAL 6GM TOTAL DOSE) - Mg 1.1 - 1.5 mg/dl 2 g As Needed 8/6/2019     Sig - Route: Infuse 50 mL into a venous catheter As Needed (See Administration Instructions). - Intravenous    Linked Group 1:  \"Or\" Linked Group Details        Magnesium Sulfate 2 gram Bolus, followed by 8 gram infusion (total Mg dose 10 grams)- Mg less than or equal to 1mg/dL 2 g As Needed 8/6/2019     Sig - Route: Infuse 50 mL into a venous catheter As Needed (See Administration Instructions). - Intravenous    Linked Group 1:  \"Or\" Linked Group Details        Magnesium Sulfate 4 gram infusion- Mg 1.6-1.9 mg/dL 4 g As Needed 8/6/2019     Sig - Route: Infuse 100 mL into a venous catheter As Needed (See Administration Instructions). - Intravenous    Linked Group 1:  \"Or\" Linked Group Details        midodrine (PROAMATINE) tablet 5 mg 5 mg 3 Times Daily Before Meals 8/25/2019     Sig - Route: Take 2 tablets by mouth 3 (Three) Times a Day Before Meals. - Oral    montelukast (SINGULAIR) tablet 10 mg 10 mg Nightly 8/6/2019     Sig - Route: Take 1 tablet by " mouth Every Night. - Oral    morphine injection 2 mg 2 mg Every 1 Hour PRN 8/28/2019     Sig - Route: Infuse 1 mL into a venous catheter Every 1 (One) Hour As Needed for Severe Pain  (for breakthrough pain; hold for oversedation, SBP<100). - Intravenous    nitroglycerin (NITROSTAT) SL tablet 0.4 mg 0.4 mg As Needed 8/6/2019     Sig - Route: Place 1 tablet under the tongue As Needed for Chest Pain. - Sublingual    ondansetron (ZOFRAN) tablet 8 mg 8 mg Every 8 Hours PRN 8/13/2019     Sig - Route: Take 2 tablets by mouth Every 8 (Eight) Hours As Needed for Nausea or Vomiting. - Oral    ondansetron (ZOFRAN) tablet 8 mg 8 mg 3 Times Daily Before Meals 8/16/2019     Sig - Route: Take 2 tablets by mouth 3 (Three) Times a Day Before Meals. - Oral    oxyCODONE (ROXICODONE) immediate release tablet 10 mg 10 mg Every 6 Hours Scheduled 9/5/2019 9/7/2019    Sig - Route: Take 2 tablets by mouth Every 6 (Six) Hours. - Oral    oxyCODONE ER (oxyCONTIN) 12 hr tablet 20 mg 20 mg Every 12 Hours Scheduled 8/6/2019     Sig - Route: Take 1 tablet by mouth Every 12 (Twelve) Hours. - Oral    pantoprazole (PROTONIX) EC tablet 40 mg 40 mg Every Morning 8/7/2019     Sig - Route: Take 1 tablet by mouth Every Morning. - Oral    polyethylene glycol 3350 powder (packet) 17 g Daily 8/20/2019     Sig - Route: Take 17 g by mouth Daily. - Oral    senna-docusate (PERICOLACE) 8.6-50 MG per tablet 2 tablet 2 tablet Nightly 8/18/2019     Sig - Route: Take 2 tablets by mouth Every Night. - Oral    sodium chloride 0.9 % flush 10 mL 10 mL Every 12 Hours Scheduled 8/19/2019     Sig - Route: Infuse 10 mL into a venous catheter Every 12 (Twelve) Hours. - Intravenous    sodium chloride 0.9 % flush 10 mL 10 mL As Needed 8/19/2019     Sig - Route: Infuse 10 mL into a venous catheter As Needed for Line Care (After Medication Administration & Prior to De-Accessing Port). - Intravenous    sodium chloride 0.9 % flush 20 mL 20 mL As Needed 8/19/2019     Sig - Route:  Infuse 20 mL into a venous catheter As Needed for Line Care (After Blood Draws or Blood Product Administration). - Intravenous    tamsulosin (FLOMAX) 24 hr capsule 0.4 mg 0.4 mg Daily 8/23/2019     Sig - Route: Take 1 capsule by mouth Daily. - Oral    oxyCODONE (ROXICODONE) immediate release tablet 5 mg (Discontinued) 5 mg Every 6 Hours Scheduled 9/4/2019 9/4/2019    Sig - Route: Take 1 tablet by mouth Every 6 (Six) Hours. - Oral            Lab Results (last 24 hours)     Procedure Component Value Units Date/Time    POC Glucose Once [[id number]]  (Normal) Collected:  09/05/19 1130    Specimen:  Blood Updated:  09/05/19 1206     Glucose 121 mg/dL     POC Glucose Once [[id number]]  (Normal) Collected:  09/05/19 0714    Specimen:  Blood Updated:  09/05/19 0722     Glucose 122 mg/dL     Basic Metabolic Panel [[id number]]  (Abnormal) Collected:  09/05/19 0529    Specimen:  Blood Updated:  09/05/19 0614     Glucose 130 mg/dL      BUN 19 mg/dL      Creatinine 1.20 mg/dL      Sodium 136 mmol/L      Potassium 4.2 mmol/L      Chloride 101 mmol/L      CO2 24.3 mmol/L      Calcium 10.1 mg/dL      eGFR Non African Amer 60 mL/min/1.73      BUN/Creatinine Ratio 15.8     Anion Gap 10.7 mmol/L     Narrative:       GFR Normal >60  Chronic Kidney Disease <60  Kidney Failure <15    C-reactive Protein [[id number]]  (Abnormal) Collected:  09/05/19 0529    Specimen:  Blood Updated:  09/05/19 0614     C-Reactive Protein 0.66 mg/dL     CBC & Differential [[id number]] Collected:  09/05/19 0529    Specimen:  Blood Updated:  09/05/19 0549    Narrative:       The following orders were created for panel order CBC & Differential.  Procedure                               Abnormality         Status                     ---------                               -----------         ------                     CBC Auto Differential[[id number]]        Abnormal            Final result                 Please view results for these tests on the individual orders.     CBC Auto Differential [[id number]]  (Abnormal) Collected:  09/05/19 0529    Specimen:  Blood Updated:  09/05/19 0549     WBC 8.32 10*3/mm3      RBC 3.23 10*6/mm3      Hemoglobin 8.7 g/dL      Hematocrit 28.7 %      MCV 88.9 fL      MCH 26.9 pg      MCHC 30.3 g/dL      RDW 17.5 %      RDW-SD 55.2 fl      MPV 10.8 fL      Platelets 212 10*3/mm3      Neutrophil % 74.0 %      Lymphocyte % 14.1 %      Monocyte % 8.5 %      Eosinophil % 2.3 %      Basophil % 0.5 %      Immature Grans % 0.6 %      Neutrophils, Absolute 6.16 10*3/mm3      Lymphocytes, Absolute 1.17 10*3/mm3      Monocytes, Absolute 0.71 10*3/mm3      Eosinophils, Absolute 0.19 10*3/mm3      Basophils, Absolute 0.04 10*3/mm3      Immature Grans, Absolute 0.05 10*3/mm3     POC Glucose Once [[id number]]  (Abnormal) Collected:  09/04/19 1919    Specimen:  Blood Updated:  09/04/19 1925     Glucose 145 mg/dL     POC Glucose Once [[id number]]  (Abnormal) Collected:  09/04/19 1613    Specimen:  Blood Updated:  09/04/19 1619     Glucose 134 mg/dL         Imaging Results (last 24 hours)     ** No results found for the last 24 hours. **             Physician Progress Notes (most recent note)      Gwen Alves MD at 9/4/2019  7:03 PM          Date:  09/04/19    CC: Shortness of breath    Subjective:  Todd Phillips is seen this afternoon in follow-up of pancreatic cancer and shortness of breath.  Unfortunately despite aggressive therapy with antimicrobials (including antifungal therapy), diuretics (which have recently induced hypotension) and aggressive nutritional and supportive care, Mr. Phillips's condition has continued to decline.  His pulmonary infiltrates are unchanged.  CRP is low.  No evidence of fluid overload.  He has some shortness of breath at rest but desaturates with minimal activity.  Further antibiotics / diuretics have not helped this.  He and has family have considered palliative care and hospice care.  He has decided that he is interested in  palliative care at home but he doesn't want to enroll in hospice because it is very important to him to have PET-CT done.  We discussed this further.  I explained that the PET probably wouldn't change anything that we do because he is too weak to receive further chemotherapy whether it shows disease progression or even regression.   I explained that I thought it would be difficult for him to get to the test to do this given how short of breath he gets with minimal activity. Nonetheless, Mr. Phillips says he feels strongly that he wants to try to do this because it is important to him.  He complains of constipation and asks for a suppository which helps him at home.  Miralax and Lactulose haven't provided results (though he declined Lactulose because of nausea this AM).  His son was concerned about fecal impaction but the patient declines enema saying suppository has always helped him in the past.         Objective:  Medications:  Scheduled Meds:    allopurinol 100 mg Oral BID   apixaban 5 mg Oral Q12H   bisacodyl 10 mg Rectal Once   budesonide 0.5 mg Nebulization BID - RT   cetirizine 5 mg Oral Daily   cholecalciferol 1,000 Units Oral Daily   flecainide 50 mg Oral Q12H   FLUoxetine 40 mg Oral QAM   insulin aspart 0-7 Units Subcutaneous 4x Daily AC & at Bedtime   ipratropium-albuterol 3 mL Nebulization Q6H - RT   lactulose 20 g Oral Daily   lactulose 30 g Oral Once   midodrine 5 mg Oral TID AC   montelukast 10 mg Oral Nightly   ondansetron 8 mg Oral TID AC   [START ON 9/5/2019] oxyCODONE 10 mg Oral Q6H   oxyCODONE 20 mg Oral Q12H   pantoprazole 40 mg Oral QAM   polyethylene glycol 17 g Oral Daily   senna-docusate 2 tablet Oral Nightly   sodium chloride 10 mL Intravenous Q12H   tamsulosin 0.4 mg Oral Daily     Continuous Infusions:   PRN Meds:.•  acetaminophen  •  acetaminophen  •  dextrose  •  dextrose  •  diphenhydrAMINE  •  glucagon (human recombinant)  •  heparin flush (porcine)  •  magnesium sulfate **OR**  magnesium sulfate **OR** magnesium sulfate  •  Morphine  •  nitroglycerin  •  ondansetron  •  sodium chloride  •  sodium chloride    Physical Exam:  Vital Signs     Patient Vitals for the past 24 hrs:   BP Temp Temp src Pulse Resp SpO2   09/04/19 1523 137/89 97.8 °F (36.6 °C) Oral 99 20 98 %   09/04/19 1351 -- -- -- 93 20 99 %   09/04/19 1344 -- -- -- 94 20 98 %   09/04/19 1135 132/92 -- -- 107 24 91 %   09/04/19 0646 -- -- -- 101 20 --   09/04/19 0636 -- -- -- 105 22 96 %   09/04/19 0622 129/85 97.8 °F (36.6 °C) Oral 102 20 95 %   09/04/19 0559 -- -- -- -- -- 94 %   09/04/19 0555 -- -- -- -- -- 93 %   09/04/19 0543 -- -- -- -- -- (!) 86 %   09/04/19 0310 -- -- -- 99 24 94 %   09/04/19 0303 -- -- -- 109 24 (!) 84 %   09/04/19 0300 125/83 98.3 °F (36.8 °C) Oral 87 20 --   09/04/19 0057 -- -- -- -- -- 97 %   09/03/19 2300 118/75 97.8 °F (36.6 °C) Oral 81 20 --   09/03/19 1929 -- -- -- 88 20 99 %   09/03/19 1914 -- -- -- 85 20 97 %       General:  Awake, alert and oriented, appears unwell, fatigued  HEENT:  Pupils are equal, round and reactive to light and accommodation, Extra-ocular movements full, oropharynx clear   Neck:  No JVD, thyromegaly or lymphadenopathy  CV:  Regular rate and rhythm, no murmurs, rubs or gallops  Resp:  Breath sounds are somewhat diminished but generally clear with maybe a few fine crackles at the L>R bases.   No rhonchi or wheezes  Abd:  Soft, non-specific diffuse abdominal tenderness, non-distended, bowel sounds present, no organomegaly  Ext:  No clubbing, cyanosis or edema  Lymph:  No cervical, supraclavicular, axillary, inguinal or femoral adenopathy  Neuro:  MS as above, CN II-XII intact, grossly non-focal exam    Labs / Studies:    Lab Results   Component Value Date    WBC 7.42 09/04/2019    HGB 8.5 (L) 09/04/2019    HCT 28.4 (L) 09/04/2019    MCV 89.6 09/04/2019    RDW 17.7 (H) 09/04/2019     09/04/2019    NEUTRORELPCT 58.0 09/04/2019    LYMPHORELPCT 25.2 09/04/2019    MONORELPCT  9.6 09/04/2019    EOSRELPCT 6.2 09/04/2019    BASORELPCT 0.5 09/04/2019    NEUTROABS 4.30 09/04/2019    LYMPHSABS 1.87 09/04/2019       Lab Results   Component Value Date     09/04/2019    K 4.1 09/04/2019    CO2 22.1 09/04/2019     09/04/2019    BUN 19 09/04/2019    CREATININE 1.24 09/04/2019    EGFRIFNONA 57 (L) 09/04/2019    EGFRIFAFRI  09/02/2016      Comment:      <15 Indicative of kidney failure.    GLUCOSE 135 (H) 09/04/2019    CALCIUM 10.1 09/04/2019    ALKPHOS 119 (H) 09/03/2019    AST 16 09/03/2019    ALT 9 09/03/2019    BILITOT 0.3 09/03/2019    ALBUMIN 3.13 (L) 09/03/2019    PROTEINTOT 6.8 09/03/2019    MG 2.7 (H) 08/27/2019    PHOS 3.8 08/26/2019     Lab Results   Component Value Date    CRP 0.38 09/04/2019    CRP 0.44 09/03/2019    CRP 0.76 (H) 09/02/2019    CRP 0.88 (H) 09/01/2019    CRP 1.31 (H) 08/31/2019    CRP 2.23 (H) 08/30/2019    CRP 1.88 (H) 08/29/2019    CRP 0.47 08/28/2019    CRP 0.63 (H) 08/27/2019    CRP 0.77 (H) 08/26/2019    CRP 1.24 (H) 08/25/2019    CRP 1.69 (H) 08/24/2019    CRP 1.47 (H) 08/23/2019    CRP 1.98 (H) 08/22/2019    CRP 1.68 (H) 08/21/2019       Lab Results   Component Value Date     134.3 (H) 09/02/2019     119.6 (H) 08/20/2019     82.9 (H) 07/30/2019     82.0 (H) 06/24/2019     76.1 (H) 04/23/2019     129.6 (H) 03/20/2019     85 (H) 02/20/2019     34 01/22/2019     104 (H) 01/07/2019     1349 (H) 12/19/2018     1089 (H) 12/10/2018     1576 (H) 11/13/2018     5149 (H) 10/19/2018     7602 (H) 09/25/2018     3636 (H) 09/07/2018     CT Chest 8-27-19  LUNGS: BILATERAL LOWER LOBE AND MINIMAL UPPER LOBE AIRSPACE DISEASE  CONSISTENT WITH PNEUMONIA.     HEART: Unremarkable.     PERICARDIUM: No effusion.     MEDIASTINUM: MEDIASTINAL AIR FROM UNCERTAIN ORIGIN.     PLEURA: No pleural effusion. No pleural mass or abnormal calcification.     MAJOR AIRWAYS: Clear. No intrinsic mass.     VASCULATURE:  No evidence of aneurysm.     VISUALIZED UPPER ABDOMEN:        LIVER: Homogeneous. No focal hepatic mass or ductal dilatation.        SPLEEN: Homogeneous. No splenomegaly.        ADRENALS: No mass.        KIDNEYS: No mass. No obstructive uropathy.  No evidence of  urolithiasis.        GI TRACT: Non-dilated. No definite wall thickening.        PERITONEUM: No free air. No free fluid or loculated fluid  collections.           ABDOMINAL WALL: No focal hernia or mass.           OTHER: None.     BONES: No acute bony abnormality.     IMPRESSION:  Impression:  Mediastinal air from uncertain origin. Bilateral lower lobe and minimal  upper lobe airspace disease consistent with pneumonia.    CXR 9-4-19: Coarse interstitial fibrotic changes bilaterally which show modest improvement when compared to study of 8/31/2019.    CTChest 9-2-19  1. Pneumomediastinum, unchanged from the prior examination 8/27/2019.  2. No interval improvement in patchy basilar predominant infiltrates throughout both lungs. Cannot exclude an underlying component of interstitial lung disease/fibrosis. Follow-up to resolution recommended.    Assessment & Plan:  Todd Phillips is a 69 y.o. year-old male with now metastatic  pancreatic cancer admitted with pneumonia now with worsening shortness of breath, increased pulmonary congestion, anorexia, hypoalbuminemia.    1.  Metastatic pancreatic cancer:  -  I fear that this is the underlying problem that is driving Mr. Phillips's problems.  -  His  level is increasing despite his treatment which would support this idea.  -  Despite very aggressive inpatient care treating infection, fluid overload, hypoalbuminemia, poor nutrition, etc, his condition has still continued to decline.    -  Given his overall declining condition, I have recommended they consider taking him home with home hospice. We discussed this at length with he and his wife and children previously and did so again today.  He would prefer to go  home with palliative care (which his son has discussed with [facility] services).  He would like to get PET-CT as originally planned prior to his hospitalization next Friday and will then consider hospice transition.       2.  Constipation:  Persists despite Miralax and Lactulose (with variable compliance with the latter).  He is passing gas and has had reduced po intake.  He would like to try a suppository which has helped him in the past.    3.  Hypoalbuminemia with poor appetite:  Marinol discontinued because of significant altered mental status.  He is eating as best he can.    4.  Prognosis:  Mr. Phillips and his family understand that his prognosis is poor.  I had a long converstation with he, his wife, his 2 sons and his daughter.  I answered all their questions to the best of my ability and they were satisfied with our plan.      Discussed with Dr. Rao.    Appreciate very much the hospitalist team and care of this patient.  We will continue to follow with you.  Please do not hesitate to call with any questions or concerns.    Gwen Alves MD  09/04/19  7:03 PM        Electronically signed by Gwen Alves MD at 9/4/2019  7:16 PM          Consult Notes (most recent note)      Angelita Ignacio APRN at 8/28/2019 12:50 PM      Consult Orders    1. Inpatient Palliative Care MD Consult [[id number]] ordered by Gwen Alves MD at 08/28/19 0958                Adiel Denney MD  Consulting physician: Dr. Gwen Alves  Reason for referral Pain Management and Hospice discussion  Chief Complaint   Patient presents with   • Chest Pain   • Fever       HPI: 69 year old male patient presented to [facility] ED on 8/6 with complaints of fever over the weekend and increasing epigastric abdominal pain.  He has history of metastatic pancreatic cancer with a whipple being completed in Dec 2018 and his last chemotherapy was the Tuesday prior to admission.  He was worked up in ED and  admitted with Sepsis 2/2 PNA.  However, despite treatment here in the hospital since that time, he has not significantly improved as one should have seen by now.  Oncologist, Dr. Alves has spoken with family regarding fluid in the lung base as possible metastatic and recommends that at his current weakness and illness- to consider going home with hospice.  We have been consulted to help clarify goals, help them understand role of hospice and when it is beneficial, and then to address pain management.     Subjective:   Patient resting with eyes closed when we entered the room.  Roused easily with light touch and call of his name.  He is surrounded by his daughter, son, and wife.  I asked him about his pain- he tells me he is hurting in his stomach- mid abdomen, and rates his pain a 7/10 currently.  He tells me the BEST his pain has ever gotten is about a 2.5.  I explained we are shooting for  2.5 then.     Advance care planning discussed: Yes  Code Status:   Current Code Status     Date Active Code Status Order ID Comments User Context       8/6/2019 17:09 CPR [id number]  Aaron Rao MD ED       Questions for Current Code Status     Question Answer Comment    Code Status CPR     Medical Interventions (Level of Support Prior to Arrest) Full         Advance Directive: MOST form on file and UTD  Surrogate decision maker: Wife   Past Medical History:   Diagnosis Date   • Antral gastritis    • Asthma    • Atrial fibrillation (CMS/HCC)     treated with oral blood thinner   • Chest pain    • COPD (chronic obstructive pulmonary disease) (CMS/HCC)    • Coronary artery disease     no stents   • Diabetes mellitus (CMS/HCC)     type 2 - checks sugar 4 times per day    • Duodenitis    • Elevated cholesterol    • Emphysema of lung (CMS/HCC)    • Gallbladder disease     removed    • GERD (gastroesophageal reflux disease)    • Hard to intubate     busted lip last time   • Hyperlipidemia    • Hypertension    • Jaundice    •  Kidney stone    • Kidney stones     had lithotripsy and passed 36 kidney stones as well as had one surgically removed.   • Malignant neoplasm of head of pancreas (CMS/HCC) 9/5/2018   • Nausea    • Obstructive sleep apnea on CPAP     compliant with machine    • Palpitations    • Pneumonia 08/2018   • Reflux esophagitis    • Renal failure     stage 3 kidney disease   • Sepsis (CMS/HCC)      Past Surgical History:   Procedure Laterality Date   • BILE DUCT STENT PLACEMENT      [facility] ago    • CARDIAC CATHETERIZATION  11/01/1999   • CARDIOVASCULAR STRESS TEST  09/2012   • CHOLECYSTECTOMY N/A 8/10/2018    Procedure: CHOLECYSTECTOMY LAPAROSCOPIC;  Surgeon: Ronak Downs MD;  Location: [facility] OR;  Service: General   • COLONOSCOPY     • CYSTOSCOPY BLADDER STONE LITHOTRIPSY     • ECHO - CONVERTED  09/2012   • ERCP N/A 8/14/2018    Procedure: ENDOSCOPIC RETROGRADE CHOLANGIOPANCREATOGRAPHY WITH PAPILLOTOMY;  Surgeon: Scot Su MD;  Location:  [facility] OR;  Service: Gastroenterology   • ERCP N/A 10/1/2018    Procedure: ENDOSCOPIC RETROGRADE CHOLANGIOPANCREATOGRAPHY;  Surgeon: Jefry Luna MD;  Location:  [facility];  Service: Gastroenterology   • KIDNEY STONE SURGERY     • KIDNEY STONE SURGERY      open abdominal surgery   • PORTACATH PLACEMENT Right 10/23/2018    Procedure: INSERTION OF PORTACATH;  Surgeon: Ronak Downs MD;  Location: [facility] OR;  Service: General   • TONSILLECTOMY     • UPPER GASTROINTESTINAL ENDOSCOPY  08/30/2012   • WHIPPLE PROCEDURE N/A 12/31/2018    Procedure: WHIPPLE PROCEDURE, BIOPSY OF LIVER, PORTAL VEIN RESECTION AND REPAIR, G/J TUBE PLACEMENT;  Surgeon: Miquel Brody MD;  Location:  [facility] OR;  Service: General       Reviewed current scheduled and prn medications for route, type, dose and frequency.    Current Facility-Administered Medications   Medication Dose Route Frequency Provider Last Rate Last Dose   • acetaminophen  (TYLENOL) tablet 650 mg  650 mg Oral Q6H PRN Nickolas Donis MD   650 mg at 08/12/19 1710   • acetaminophen (TYLENOL) tablet 650 mg  650 mg Oral Q6H PRN Troy Reddy MD   650 mg at 08/13/19 0125   • allopurinol (ZYLOPRIM) tablet 100 mg  100 mg Oral BID Nickolas Donis MD   100 mg at 08/28/19 0932   • apixaban (ELIQUIS) tablet 5 mg  5 mg Oral Q12H Nickolas Donis MD   5 mg at 08/28/19 0933   • budesonide (PULMICORT) nebulizer solution 0.5 mg  0.5 mg Nebulization BID - RT Nickolas Donis MD   0.5 mg at 08/28/19 0716   • cefepime (MAXIPIME) 2 g/100 mL 0.9% NS (mbp)  2 g Intravenous Once Kelly Dumont  mL/hr at 08/28/19 1345 2 g at 08/28/19 1345   • cefepime (MAXIPIME) 2 g/100 mL 0.9% NS (mbp)  2 g Intravenous Q8H Kelly Dumont MD       • cetirizine (zyrTEC) tablet 5 mg  5 mg Oral Daily Nickolas Donis MD   5 mg at 08/28/19 0932   • cholecalciferol (VITAMIN D3) tablet 1,000 Units  1,000 Units Oral Daily Nickolas Donis MD   1,000 Units at 08/28/19 0932   • dextrose (D50W) 25 g/ 50mL Intravenous Solution 25 g  25 g Intravenous Q15 Min PRN Reta Harris APRN       • dextrose (GLUTOSE) oral gel 15 g  15 g Oral Q15 Min PRN Reta Harris APRN       • diphenhydrAMINE (BENADRYL) capsule 25 mg  25 mg Oral Q6H PRN Troy Reddy MD   25 mg at 08/21/19 2015   • dronabinol (MARINOL) capsule 2.5 mg  2.5 mg Oral BID Laura Jackman APRN   2.5 mg at 08/28/19 0931   • flecainide (TAMBOCOR) tablet 50 mg  50 mg Oral Q12H Nickolas Donis MD   50 mg at 08/28/19 0932   • FLUoxetine (PROzac) capsule 40 mg  40 mg Oral QAM Nickolas Donis MD   40 mg at 08/28/19 0638   • glucagon (human recombinant) (GLUCAGEN DIAGNOSTIC) injection 1 mg  1 mg Subcutaneous PRN Reta Harris APRN       • heparin flush (porcine) 100 UNIT/ML injection 500 Units  5 mL Intravenous PRN Aaron Rao MD       • insulin aspart (novoLOG) injection 0-7  Units  0-7 Units Subcutaneous 4x Daily AC & at Bedtime Reta Harris APRN   2 Units at 08/28/19 1138   • ipratropium-albuterol (DUO-NEB) nebulizer solution 3 mL  3 mL Nebulization Q6H PRN Aaron Rao MD   3 mL at 08/28/19 0716   • Magnesium Sulfate 2 gram Bolus, followed by 8 gram infusion (total Mg dose 10 grams)- Mg less than or equal to 1mg/dL  2 g Intravenous PRN Reta Harris APRN        Or   • Magnesium Sulfate 2 gram / 50mL Infusion (GIVE X 3 BAGS TO EQUAL 6GM TOTAL DOSE) - Mg 1.1 - 1.5 mg/dl  2 g Intravenous PRN Reta Harris APRN        Or   • Magnesium Sulfate 4 gram infusion- Mg 1.6-1.9 mg/dL  4 g Intravenous PRN Reta Harris APRN       • midodrine (PROAMATINE) tablet 5 mg  5 mg Oral TID AC Luis Manuel Meraz DO   5 mg at 08/28/19 1138   • montelukast (SINGULAIR) tablet 10 mg  10 mg Oral Nightly Nickolas Donis MD   10 mg at 08/27/19 2115   • morphine injection 2 mg  2 mg Intravenous Q1H PRN Gwen Alves MD   2 mg at 08/28/19 1137   • nitroglycerin (NITROSTAT) SL tablet 0.4 mg  0.4 mg Sublingual PRN Nickolas Donis MD       • ondansetron (ZOFRAN) tablet 8 mg  8 mg Oral Q8H PRN Faiza Toro MD   8 mg at 08/21/19 2004   • ondansetron (ZOFRAN) tablet 8 mg  8 mg Oral TID AC Princess Harley DO   8 mg at 08/28/19 1138   • oxyCODONE (ROXICODONE) immediate release tablet 10 mg  10 mg Oral Q6H PRN Nickolas Donis MD   10 mg at 08/24/19 1522   • oxyCODONE ER (oxyCONTIN) 12 hr tablet 20 mg  20 mg Oral Q12H Nickolas Donis MD   20 mg at 08/28/19 0928   • pantoprazole (PROTONIX) EC tablet 40 mg  40 mg Oral QAM Nickolas Donis MD   40 mg at 08/28/19 0639   • polyethylene glycol 3350 powder (packet)  17 g Oral Daily Aaron Roa MD   17 g at 08/28/19 0933   • prochlorperazine (COMPAZINE) tablet 10 mg  10 mg Oral Q6H PRN Nickolas Donis MD   10 mg at 08/27/19 2110   • senna-docusate (PERICOLACE) 8.6-50 MG per tablet 2  tablet  2 tablet Oral Nightly Princess Harley, DO   2 tablet at 08/27/19 2115   • sodium chloride 0.9 % flush 10 mL  10 mL Intravenous Q12H Aaron Rao MD   10 mL at 08/28/19 0931   • sodium chloride 0.9 % flush 10 mL  10 mL Intravenous PRN Aaron Rao MD       • sodium chloride 0.9 % flush 20 mL  20 mL Intravenous PRN Aaron Rao MD       • tamsulosin (FLOMAX) 24 hr capsule 0.4 mg  0.4 mg Oral Daily Aaron Rao MD   0.4 mg at 08/28/19 0932   • [START ON 8/29/2019] vancomycin (VANCOCIN) 1,000 mg in sodium chloride 0.9 % 250 mL IVPB  1,000 mg Intravenous Q12H Kelly Dumont MD       • vancomycin (VANCOCIN) 1,500 mg in sodium chloride 0.9 % 500 mL IVPB  1,500 mg Intravenous Once Kelly Dumont MD         Facility-Administered Medications Ordered in Other Encounters   Medication Dose Route Frequency Provider Last Rate Last Dose   • sodium chloride 0.9 % infusion  - ADS Override Pull            • sodium chloride 0.9 % infusion  - ADS Override Pull                 •  acetaminophen  •  acetaminophen  •  dextrose  •  dextrose  •  diphenhydrAMINE  •  glucagon (human recombinant)  •  heparin flush (porcine)  •  ipratropium-albuterol  •  magnesium sulfate **OR** magnesium sulfate **OR** magnesium sulfate  •  Morphine  •  nitroglycerin  •  ondansetron  •  oxyCODONE  •  prochlorperazine  •  sodium chloride  •  sodium chloride  Allergies   Allergen Reactions   • Chlorhexidine Hives and Rash   • Contrast Dye Other (See Comments)     Can't have due to kidney failure per family   • Fentanyl Confusion and Unknown (See Comments)     Patient family reports extreme symptoms with fentanyl. Including confusion, restlessness, irritability an heavy sedation.     Family History   Problem Relation Age of Onset   • Heart attack Mother    • Heart disease Mother    • Stroke Mother    • Kidney disease Mother    • Heart failure Mother    • Lung disease Father    • Tuberculosis Father    • Diabetes Sister    •  "Heart attack Brother    • Heart failure Brother    • Heart disease Brother    • Diabetes Sister    • No Known Problems Brother    • No Known Problems Brother      Social History     Socioeconomic History   • Marital status:      Spouse name: Not on file   • Number of children: Not on file   • Years of education: Not on file   • Highest education level: Not on file   Tobacco Use   • Smoking status: Never Smoker   • Smokeless tobacco: Never Used   Substance and Sexual Activity   • Alcohol use: No   • Drug use: No   • Sexual activity: Defer     Partners: Female   Social History Narrative    He is  and lives alone with his wife although they have 3 children together who all live close by. He is retired from the [employer] and was exposed to Agent Orange during [place]. He is a lifelong nonsmoker.        Review of Systems - +/- per HPI and symptom review.      PPS: 30  BP 91/56 (BP Location: Left arm, Patient Position: Lying)   Pulse 96   Temp 97.7 °F (36.5 °C) (Oral)   Resp 20   Ht 182.9 cm (72\")   Wt 73.5 kg (162 lb 1.6 oz)   SpO2 98%   BMI 21.98 kg/m²    73.5 kg (162 lb 1.6 oz) Body mass index is 21.98 kg/m².  Intake & Output (last day)       08/27 0701 - 08/28 0700 08/28 0701 - 08/29 0700    P.O. 480     Total Intake(mL/kg) 480 (6.5)     Urine (mL/kg/hr) 350 (0.2) 400 (0.8)    Stool 0     Total Output 350 400    Net +130 -400          Stool Unmeasured Occurrence 0 x           Physical Exam:  General Appearance: chronically ill appearing, weak   Head: Normocephalic without obvious abnormality, atraumatic  Eyes: Conjunctivae and sclerae normal, no icterus, FREDDIE  Throat: No oral lesions, no thrush, oral mucosa moist  Neck: No adenopathy, supple trachea, midline  Lungs: even, non-labored, scattered rhonchi bases, diminished- clear upper airway   Heart: Irregular regular   Abdomen: soft, hypoactive bowel sounds, tender left quadrants and epigastric area  Extremities: Moves all extremities, no redness, " no cyanosis, no edema, no bilateral feet sores or wounds  Pulses: Distal pulses palpable and equal bilaterally  Skin: Warm and dry, no bleeding, pale  Neurological: Alert, interactive, appropriate conversation,no myoclonus, equal hand  and strength, able to lift lower extremities off bed     Reviewed labs and diagnostic results.  Lab Results   Component Value Date    HGBA1C 7.20 (H) 08/06/2019     Results from last 7 days   Lab Units 08/28/19  0421   WBC 10*3/mm3 7.18   HEMOGLOBIN g/dL 8.2*   HEMATOCRIT % 27.9*   PLATELETS 10*3/mm3 319     Results from last 7 days   Lab Units 08/28/19  0421  08/25/19  0441   SODIUM mmol/L 139   < > 136   POTASSIUM mmol/L 4.2   < > 4.0   CHLORIDE mmol/L 104   < > 98   CO2 mmol/L 26.6   < > 27.7   BUN mg/dL 12   < > 16   CREATININE mg/dL 1.00   < > 1.18   CALCIUM mg/dL 9.2   < > 9.2   BILIRUBIN mg/dL  --   --  0.3   ALK PHOS U/L  --   --  115   ALT (SGPT) U/L  --   --  6   AST (SGOT) U/L  --   --  17   GLUCOSE mg/dL 112*   < > 135*    < > = values in this interval not displayed.     Results from last 7 days   Lab Units 08/28/19  0421   SODIUM mmol/L 139   POTASSIUM mmol/L 4.2   CHLORIDE mmol/L 104   CO2 mmol/L 26.6   BUN mg/dL 12   CREATININE mg/dL 1.00   GLUCOSE mg/dL 112*   CALCIUM mg/dL 9.2     Imaging Results (last 72 hours)     Procedure Component Value Units Date/Time    XR Chest 1 View [[id number]] Collected:  08/28/19 0903     Updated:  08/28/19 0905    Narrative:       EXAMINATION: XR CHEST 1 VW-      CLINICAL INDICATION:     hypoxemia; A41.9-Sepsis, unspecified organism;  E87.4-Rcpx-thhakwidqt and hyponatremia; E83.42-Hypomagnesemia     TECHNIQUE: Single AP view of chest.      COMPARISON: 08/26/2019      FINDINGS:   There is bibasilar atelectasis.   Bibasilar airspace and peripheral patchy airspace opacity may represent  chronic fibrosis but with some superimposed infectious etiology.  Right Port-A-Cath with tip in SVC.   Heart size is stable.  No pneumothorax.   Tiny  bilateral pleural effusions persist.        Impression:       Bibasilar airspace and peripheral patchy airspace opacity  may represent chronic fibrosis but with some superimposed infectious  etiology.     This report was finalized on 8/28/2019 9:03 AM by Dr. Helio Gleason MD.       CT Chest Without Contrast [[id number]] Collected:  08/27/19 0937     Updated:  08/27/19 0940    Narrative:       CT CHEST WO CONTRAST-      TECHNIQUE: Multiple axial CT images were obtained from lung apex through  upper abdomen without administration of IV contrast. Reformatted images  in the coronal and/or sagittal plane(s) were generated from the axial  data set to facilitate diagnostic accuracy and/or surgical planning.  Oral Contrast:NONE.     Radiation dose reduction techniques were utilized per ALARA protocol.  Automated exposure control was initiated through either or Connect or  DoseRight software packages by  protocol.          601.95 mGy.cm  Clinical information  Pneumonia complicated / unresolved      Comparison  NONE.     Findings  LUNGS: BILATERAL LOWER LOBE AND MINIMAL UPPER LOBE AIRSPACE DISEASE  CONSISTENT WITH PNEUMONIA.     HEART: Unremarkable.     PERICARDIUM: No effusion.     MEDIASTINUM: MEDIASTINAL AIR FROM UNCERTAIN ORIGIN.     PLEURA: No pleural effusion. No pleural mass or abnormal calcification.     MAJOR AIRWAYS: Clear. No intrinsic mass.     VASCULATURE: No evidence of aneurysm.     VISUALIZED UPPER ABDOMEN:        LIVER: Homogeneous. No focal hepatic mass or ductal dilatation.        SPLEEN: Homogeneous. No splenomegaly.        ADRENALS: No mass.        KIDNEYS: No mass. No obstructive uropathy.  No evidence of  urolithiasis.        GI TRACT: Non-dilated. No definite wall thickening.        PERITONEUM: No free air. No free fluid or loculated fluid  collections.           ABDOMINAL WALL: No focal hernia or mass.           OTHER: None.     BONES: No acute bony abnormality.       Impression:        Impression:  Mediastinal air from uncertain origin. Bilateral lower lobe and minimal  upper lobe airspace disease consistent with pneumonia.     This report was finalized on 8/27/2019 9:38 AM by Dr. Helio Gleason MD.       XR Chest 1 View [[id number]] Collected:  08/26/19 0731     Updated:  08/26/19 0733    Narrative:       CHEST X-RAY, 8/26/2019      HISTORY:    69-year-old male hospital inpatient with sepsis, pneumonia, dyspnea. Follow-up cardiopulmonary status. Past history of Whipple procedure for pancreatic cancer.    TECHNIQUE:  AP portable chest x-ray.    COMPARISON:  *  Chest x-ray, 8/25/2019 and 8/23/2019.  *  CT chest, 8/19/2019.    FINDINGS:  Diffuse ill-defined reticulonodular infiltrate, greatest in the mid and lower lungs, is unchanged. No dense segmental airspace consolidation or pleural effusion. Stable mild cardiomegaly. Right IJ central line in good position.      Impression:       Stable portable chest x-ray, unchanged since yesterday. Diffuse reticulonodular pulmonary infiltrate.    Signer Name: Maury Vital MD   Signed: 8/26/2019 7:31 AM   [facility]station [facility]: [facility]    [facility]        Impression: 69 y.o. male with severe sepsis 2/2 PNA with increasing pulmonary congestion, metastatic pancreatic cancer.     Plan:     1.  Goals of Care   - Spoke with patient and wife along with daughter and 1 son at bedside this afternoon.  We discussed conversation with Dr. Alves today and what they understood.  They are awaiting Dr. Alves to come back this evening to have discussion with his children.  Wife states that she understood they felt he was not improving and likely due to metastatic spread.  We attempted to discuss options with patient and family. He is from [place] and therefore, [facility] is who serves that area and we have discussed this hospice service and what hospice does for families and patients- to this family.  They were tearful.   "Patient wants to await Dr. Alves and let them \"think on things\"  We have provided a pen and paper for them to jot down questions they may have for us in the morning.  They were appreciative.    2.  Decreased Appetite/PO intake    - Wife asking about taking home \"IV fluids\" for him to get daily since he does not want to eat and drink.  Wife tells me that she has utilized HH agency and they have had IVFs for him in the home for when he needs it.  Daughter is an NP and she aids in hooking up IV and stopping fluids.  I have discussed hospice mindset and that typically this is not in the careplan for hospice.  However, Jimbo EstradaBrighton would be best to answer this.  I also discussed how fluid overload may occur because he cannot process the fluids.  We discussed how this could add to SOB, smothering.  She verbalizes understanding.      3.  Dyspnea/Abd pain   - Patient rates pain a \"7/10\" abdominal pain.  He has not been using IV morphine as much as he can.  It was increased this AM.  We have been asked to assist.  He has scheduled oxycontin BID and he has PRN oxy IR but this has not been used.  IN adjusting for preparation for home, I have scheduled oxy IR every 6 hours and discontinued PRN dose.  He can still have morphine PRN for severe BTP.  We will monitor progress and make adjustments as needed.  Daughter states he is fearful of going home in pain.  We will closely work with patient to assist with pain needs.  Please do not hesitate to contact us throughout night, via our on call provider should patient require something else.     4.  Sepsis    - Has been treated with antibiotics.  I/D has been on the case.  Wife again, is asking for IV antibiotics at home.  Again, idea of continued antibiotics via IV would not be part of the hospice careplan usually.  I have tried helping them understand difference in hospice vs. HH and comfort care vs. Aggressive therapies/treatments       Angelita Yue Ignacio, " VIMAL  [phone]  19  1:57 PM      Time:90 minutes spent reviewing medical and medication records, assessing and examining patient, discussing with patient, family and nursing staff, answering questions, formulating a plan and documentation of care. > 50% time spent face to face       Electronically signed by Angelita Ignacio APRN at 2019 11:58 PM       Nutrition Notes (most recent note)     No notes of this type exist for this encounter.           Physical Therapy Notes (most recent note)      Jeniffer Guadarrama, PT at 9/3/2019  2:45 PM  Version 1 of 1         Acute Care - Physical Therapy Re-Evaluation   Jameel     Patient Name: Todd Phillips  : 1950  MRN: [id number]  Today's Date: 9/3/2019   Onset of Illness/Injury or Date of Surgery: 19  Date of Referral to PT: 19  Referring Physician: Dr. Harley      Admit Date: 2019    Visit Dx:     ICD-10-CM ICD-9-CM   1. Sepsis, due to unspecified organism (CMS/[facility]) A41.9 038.9     995.91   2. Dehydration with hyponatremia E87.1 276.1   3. Hypomagnesemia E83.42 275.2     Patient Active Problem List   Diagnosis   • Hyperlipidemia   • Gout without tophus   • Anxiety and depression   • Primary insomnia   • Gastroesophageal reflux disease without esophagitis   • Nonobstructive atherosclerosis of coronary artery   • CKD stage 3 secondary to diabetes (CMS/[facility])   • DM2 (diabetes mellitus, type 2) (CMS/[facility])   • Paroxysmal atrial fibrillation   • Chronic anticoagulation, Eliquis   • Encounter for monitoring flecainide therapy   • History of Malignant neoplasm of head of pancreas S/P Whipple and chemotherapy (ongoing)  (CMS/HCC)   • Bacteremia due to Escherichia coli   • Biliary obstruction   • Thrombocytopenia (CMS/HCC)   • Anemia due to chemotherapy, chronic disease, and possible iron deficiency.  Transfuse 19   • Fever   • Post-operative confusion and somnolence, likely due to oversedation   • PAF on home Eliquis (CMS/[facility])   • Adult  necrotizing enterocolitis (CMS/HCC)   • Encephalopathy   • Intra-abdominal abscess (S/P Percutaneous drain)   • Metabolic encephalopathy   • Dehydration   • Nausea   • Iron deficiency anemia secondary to inadequate dietary iron intake   • Malabsorption of iron   • Pancytopenia due to antineoplastic chemotherapy (CMS/HCC)   • Minimal pulmonary infiltrate left lower lobe.  Cannot rule out early pneumonia   • Possible bacterial enteritis seen on CT   • Sepsis (CMS/HCC)     Past Medical History:   Diagnosis Date   • Antral gastritis    • Asthma    • Atrial fibrillation (CMS/HCC)     treated with oral blood thinner   • Chest pain    • COPD (chronic obstructive pulmonary disease) (CMS/HCC)    • Coronary artery disease     no stents   • Diabetes mellitus (CMS/HCC)     type 2 - checks sugar 4 times per day    • Duodenitis    • Elevated cholesterol    • Emphysema of lung (CMS/HCC)    • Gallbladder disease     removed    • GERD (gastroesophageal reflux disease)    • Hard to intubate     busted lip last time   • Hyperlipidemia    • Hypertension    • Jaundice    • Kidney stone    • Kidney stones     had lithotripsy and passed 36 kidney stones as well as had one surgically removed.   • Malignant neoplasm of head of pancreas (CMS/HCC) 9/5/2018   • Nausea    • Obstructive sleep apnea on CPAP     compliant with machine    • Palpitations    • Pneumonia 08/2018   • Reflux esophagitis    • Renal failure     stage 3 kidney disease   • Sepsis (CMS/HCC)      Past Surgical History:   Procedure Laterality Date   • BILE DUCT STENT PLACEMENT      [facility]    • CARDIAC CATHETERIZATION  11/01/1999   • CARDIOVASCULAR STRESS TEST  09/2012   • CHOLECYSTECTOMY N/A 8/10/2018    Procedure: CHOLECYSTECTOMY LAPAROSCOPIC;  Surgeon: Ronak Downs MD;  Location: [facility];  Service: General   • COLONOSCOPY     • CYSTOSCOPY BLADDER STONE LITHOTRIPSY     • ECHO - CONVERTED  09/2012   • ERCP N/A 8/14/2018    Procedure:  ENDOSCOPIC RETROGRADE CHOLANGIOPANCREATOGRAPHY WITH PAPILLOTOMY;  Surgeon: Scot Su MD;  Location:  [facility];  Service: Gastroenterology   • ERCP N/A 10/1/2018    Procedure: ENDOSCOPIC RETROGRADE CHOLANGIOPANCREATOGRAPHY;  Surgeon: Jefry Luna MD;  Location:  [facility];  Service: Gastroenterology   • KIDNEY STONE SURGERY     • KIDNEY STONE SURGERY      open abdominal surgery   • PORTACATH PLACEMENT Right 10/23/2018    Procedure: INSERTION OF PORTACATH;  Surgeon: Ronak Downs MD;  Location:  [facility];  Service: General   • TONSILLECTOMY     • UPPER GASTROINTESTINAL ENDOSCOPY  08/30/2012   • WHIPPLE PROCEDURE N/A 12/31/2018    Procedure: WHIPPLE PROCEDURE, BIOPSY OF LIVER, PORTAL VEIN RESECTION AND REPAIR, G/J TUBE PLACEMENT;  Surgeon: Miquel Brody MD;  Location:  [facility];  Service: General        PT ASSESSMENT (last 12 hours)      Physical Therapy Evaluation    No documentation.       Physical Therapy Education     Title: PT OT SLP Therapies (In Progress)     Topic: Physical Therapy (In Progress)     Point: Mobility training (In Progress)     Learning Progress Summary           Patient Acceptance, E, VU by BC at 9/3/2019  2:38 PM    Acceptance, E, NR by LM at 9/3/2019 11:26 AM    Acceptance, E, NR by TT at 9/2/2019  2:54 PM    Acceptance, E,TB, VU by RF at 8/28/2019  3:02 PM    Acceptance, E, VU by LM at 8/28/2019 10:20 AM    Acceptance, E,D, VU,NR by LL at 8/27/2019  4:11 PM    Acceptance, E, VU by CL at 8/24/2019  2:27 PM    Acceptance, E,TB, VU by LJ at 8/23/2019 10:53 AM    Acceptance, E,TB, VU by BC1 at 8/22/2019 11:58 PM    Acceptance, E, VU by BC at 8/22/2019 11:36 AM    Acceptance, E, VU by BC at 8/22/2019 11:36 AM    Acceptance, E,TB, VU by LJ at 8/22/2019 10:58 AM    Acceptance, E,TB, VU by LJ at 8/18/2019 10:29 AM    Acceptance, E,TB, VU by LJ at 8/16/2019 12:22 PM    Acceptance, E,TB, VU by AD at 8/16/2019 10:16 AM    Kasia, REENA ROMO by RT at 8/15/2019   3:29 PM    Acceptance, E, NR by BC at 8/14/2019  4:19 PM   Family Acceptance, E, NR by TT at 9/2/2019  2:54 PM    Acceptance, E, VU by CL at 8/24/2019  2:27 PM                   Point: Home exercise program (In Progress)     Learning Progress Summary           Patient Acceptance, E, VU by BC at 9/3/2019  2:38 PM    Acceptance, E, NR by LM at 9/3/2019 11:26 AM    Acceptance, E, NR by TT at 9/2/2019  2:54 PM    Acceptance, E,TB, VU by RF at 8/28/2019  3:02 PM    Acceptance, E, VU by LM at 8/28/2019 10:20 AM    Acceptance, E,D, VU,NR by LL at 8/27/2019  4:11 PM    Acceptance, E, VU by CL at 8/24/2019  2:27 PM    Acceptance, E,TB, VU by LJ at 8/23/2019 10:53 AM    Acceptance, E,TB, VU by BC1 at 8/22/2019 11:58 PM    Acceptance, E, VU by BC at 8/22/2019 11:36 AM    Acceptance, E, VU by BC at 8/22/2019 11:36 AM    Acceptance, E,TB, VU by LJ at 8/22/2019 10:58 AM    Acceptance, E,TB, VU by LJ at 8/18/2019 10:29 AM    Acceptance, E,TB, VU by LJ at 8/16/2019 12:22 PM    Acceptance, E,TB, VU by AD at 8/16/2019 10:16 AM    Acceptance, D, DU by RT at 8/15/2019  3:29 PM    Acceptance, E, NR by BC at 8/14/2019  4:19 PM   Family Acceptance, E, NR by TT at 9/2/2019  2:54 PM    Acceptance, E, VU by CL at 8/24/2019  2:27 PM                   Point: Body mechanics (In Progress)     Learning Progress Summary           Patient Acceptance, E, VU by BC at 9/3/2019  2:38 PM    Acceptance, E, NR by LM at 9/3/2019 11:26 AM    Acceptance, E, NR by TT at 9/2/2019  2:54 PM    Acceptance, E,TB, VU by RF at 8/28/2019  3:02 PM    Acceptance, E, VU by LM at 8/28/2019 10:20 AM    Acceptance, E,D, VU,NR by LL at 8/27/2019  4:11 PM    Acceptance, E, VU by CL at 8/24/2019  2:27 PM    Acceptance, E,TB, VU by LJ at 8/23/2019 10:53 AM    Acceptance, E,TB, VU by BC1 at 8/22/2019 11:58 PM    Acceptance, E, VU by BC at 8/22/2019 11:36 AM    Acceptance, E, VU by BC at 8/22/2019 11:36 AM    Acceptance, E,TB, VU by LJ at 8/22/2019 10:58 AM    Acceptance, E,TB,  VU by LJ at 8/18/2019 10:29 AM    Acceptance, E,TB, VU by LJ at 8/16/2019 12:22 PM    Acceptance, E,TB, VU by AD at 8/16/2019 10:16 AM    Acceptance, D, DU by RT at 8/15/2019  3:29 PM    Acceptance, E, NR by BC at 8/14/2019  4:19 PM   Family Acceptance, E, NR by TT at 9/2/2019  2:54 PM    Acceptance, E, VU by CL at 8/24/2019  2:27 PM                   Point: Precautions (In Progress)     Learning Progress Summary           Patient Acceptance, E, VU by BC at 9/3/2019  2:38 PM    Acceptance, E, NR by LM at 9/3/2019 11:26 AM    Acceptance, E, NR by TT at 9/2/2019  2:54 PM    Acceptance, E,TB, VU by RF at 8/28/2019  3:02 PM    Acceptance, E, VU by LM at 8/28/2019 10:20 AM    Acceptance, E,D, VU,NR by LL at 8/27/2019  4:11 PM    Acceptance, E, VU by CL at 8/24/2019  2:27 PM    Acceptance, E,TB, VU by LJ at 8/23/2019 10:53 AM    Acceptance, E,TB, VU by BC1 at 8/22/2019 11:58 PM    Acceptance, E, VU by BC at 8/22/2019 11:36 AM    Acceptance, E, VU by BC at 8/22/2019 11:36 AM    Acceptance, E,TB, VU by LJ at 8/22/2019 10:58 AM    Acceptance, E,TB, VU by LJ at 8/18/2019 10:29 AM    Acceptance, E,TB, VU by LJ at 8/16/2019 12:22 PM    Acceptance, E,TB, VU by AD at 8/16/2019 10:16 AM    Acceptance, D, DU by RT at 8/15/2019  3:29 PM    Acceptance, E, NR by BC at 8/14/2019  4:19 PM   Family Acceptance, E, NR by TT at 9/2/2019  2:54 PM    Acceptance, E, VU by CL at 8/24/2019  2:27 PM                               User Key     Initials Effective Dates Name Provider Type Discipline    CL 06/16/16 -  Yeny Lopez, RN Registered Nurse Nurse    AD 04/03/18 - 09/02/19 Dalton, Ashley Claudene, PT Physical Therapist PT    TT 06/08/18 -  Freya Swanson, RN Registered Nurse Nurse    BC 03/14/16 -  Jeniffer Guadarrama, PT Physical Therapist PT    LL 05/02/16 -  Maribeth Dacosta, EDWARD Physical Therapy Assistant PT    RT 04/03/18 -  Faisal Marcos, PT Physical Therapist PT    LJ 04/30/18 -  Darlyn Roberson, RN Registered Nurse Nurse     RF 03/07/18 -  Krystal Oviedo PTA Physical Therapy Assistant PT    LM 08/30/18 -  Slaome Zurita RN Registered Nurse Nurse    BC1 11/14/18 -  Judah Daniels, RN Registered Nurse Nurse              PT Recommendation and Plan  Planned Therapy Interventions (PT Eval): balance training, bed mobility training, gait training, home exercise program, patient/family education, strengthening, transfer training  Therapy Frequency (PT Clinical Impression): (3-5x/week)  Plan of Care Reviewed With: patient  Progress: no change  Outcome Measures     Row Name 09/03/19 1400             How much help from another person do you currently need...    Turning from your back to your side while in flat bed without using bedrails?  2  -BC      Moving from lying on back to sitting on the side of a flat bed without bedrails?  2  -BC      Moving to and from a bed to a chair (including a wheelchair)?  2  -BC      Standing up from a chair using your arms (e.g., wheelchair, bedside chair)?  2  -BC      Climbing 3-5 steps with a railing?  1  -BC      To walk in hospital room?  2  -BC      AM-PAC 6 Clicks Score (PT)  11  -BC         Functional Assessment    Outcome Measure Options  AM-PAC 6 Clicks Basic Mobility (PT)  -BC        User Key  (r) = Recorded By, (t) = Taken By, (c) = Cosigned By    Initials Name Provider Type    Jeniffer Huang PT Physical Therapist         Time Calculation:   PT Charges     Row Name 09/03/19 1444             Time Calculation    PT Received On  09/03/19  -BC         Time Calculation- PT    Total Timed Code Minutes- PT  60 minute(s)  -BC         Timed Charges    20284 - PT Therapeutic Exercise Minutes  15  -BC      41430 - PT Therapeutic Activity Minutes  15  -BC        User Key  (r) = Recorded By, (t) = Taken By, (c) = Cosigned By    Initials Name Provider Type    Jeniffer Huang PT Physical Therapist        Therapy Charges for Today     Code Description Service Date Service Provider Modifiers  Qty    32122436496  PT THER PROC EA 15 MIN 9/3/2019 Jeniffer Guadarrama, PT GP 1    [id number]  PT THERAPEUTIC ACT EA 15 MIN 9/3/2019 Jeniffer Guadarrama, PT GP 1          PT G-Codes  Outcome Measure Options: AM-PAC 6 Clicks Basic Mobility (PT)  AM-PAC 6 Clicks Score (PT): 11      Jeniffer Guadarrama, PT  9/3/2019          Electronically signed by Jeniffer Guadarrama PT at 9/3/2019  2:49 PM       Occupational Therapy Notes (most recent note)     No notes of this type exist for this encounter.        Speech Language Pathology Notes (most recent note)     No notes of this type exist for this encounter.        Respiratory Therapy Notes (most recent note)     No notes of this type exist for this encounter.        ADL Documentation (most recent)      Most Recent Value   Presence of Pain  complains of pain/discomfort   Transferring  3 - assistive equipment and person   Toileting  3 - assistive equipment and person   Bathing  2 - assistive person   Dressing  2 - assistive person   Eating  2 - assistive person   Communication  0 - understands/communicates without difficulty   Swallowing  0 - swallows foods/liquids without difficulty   Equipment Currently Used at Home  walker, rolling, hospital bed, bipap/cpap          Discharge Summary     No notes of this type exist for this encounter.        Discharge Order (From admission, onward)    Start     Ordered    09/05/19 1150  Discharge patient  Once     Comments:  Ambulance ride home   Expected Discharge Date:  09/05/19    Discharge Disposition:  Home or Self Care    Physician of Record for Attribution - Please select from Treatment Team:  TRENT ADAM [[id number]]    Review needed by CMO to determine Physician of Record:  No       Question Answer Comment   Physician of Record for Attribution - Please select from Treatment Team TRENT ADAM    Review needed by CMO to determine Physician of Record No        09/05/19 1200

## (undated) DEVICE — HOLDER: Brand: DEROYAL

## (undated) DEVICE — CANNULA,OXY,ADULT,SUPERSOFT,W/7'TUB,UC: Brand: MEDLINE

## (undated) DEVICE — RETRIEVAL BALLOON CATHETER: Brand: EXTRACTOR™ PRO RX

## (undated) DEVICE — ENDOCUT SCISSOR TIP, DISPOSABLE: Brand: RENEW

## (undated) DEVICE — INSUFFLATION NEEDLE TO ESTABLISH PNEUMOPERITONEUM.: Brand: INSUFFLATION NEEDLE

## (undated) DEVICE — GW JAG STR .035IN 450CM

## (undated) DEVICE — UNIVERSAL STAPLER: Brand: ENDO GIA ULTRA

## (undated) DEVICE — COR LAP CHOLE: Brand: MEDLINE INDUSTRIES, INC.

## (undated) DEVICE — SUT PDS 1 CTX 36IN Z371T

## (undated) DEVICE — BIPOLAR SEALER 23-301-1 AQM MBS: Brand: AQUAMANTYS™

## (undated) DEVICE — ENDOPOUCH RETRIEVER SPECIMEN RETRIEVAL BAGS: Brand: ENDOPOUCH RETRIEVER

## (undated) DEVICE — 3M™ IOBAN™ 2 ANTIMICROBIAL INCISE DRAPE 6650EZ: Brand: IOBAN™ 2

## (undated) DEVICE — SPNG GZ WOVN 4X4IN 12PLY 10/BX STRL

## (undated) DEVICE — WIREGUIDED RETRIEVAL BASKET: Brand: TRAPEZOID RX

## (undated) DEVICE — SKIN AFFIX SURG ADHESIVE 72/CS 0.55ML: Brand: MEDLINE

## (undated) DEVICE — GOWN,PREVENTION PLUS,XXLARGE,STERILE: Brand: MEDLINE

## (undated) DEVICE — MEDI-VAC NON-CONDUCTIVE SUCTION TUBING: Brand: CARDINAL HEALTH

## (undated) DEVICE — SUT PDS 3/0 SH 27IN CLR PDP416H

## (undated) DEVICE — BOOT SUT XRAY DETECT STD YEL/BLU CA/50

## (undated) DEVICE — PAD GRND REM POLYHESIVE A/ DISP

## (undated) DEVICE — [HIGH FLOW INSUFFLATOR,  DO NOT USE IF PACKAGE IS DAMAGED,  KEEP DRY,  KEEP AWAY FROM SUNLIGHT,  PROTECT FROM HEAT AND RADIOACTIVE SOURCES.]: Brand: PNEUMOSURE

## (undated) DEVICE — DEV LK WIREGUIDE FUSN OLYMP SCP

## (undated) DEVICE — COVER,LIGHT HANDLE,FLX,1/PK: Brand: MEDLINE INDUSTRIES, INC.

## (undated) DEVICE — BALLOON DILATATION CATHETER: Brand: HURRICANE™ RX

## (undated) DEVICE — DEV OPN LIGASURE CRV 180D 36MM 13.5CM  1P/U

## (undated) DEVICE — ULTRACLEAN ACCESSORY ELECTRODE 4" (10.16 CM) COATED BLADE: Brand: ULTRACLEAN

## (undated) DEVICE — SUT ETHLN 2/0 PS 18IN 585H

## (undated) DEVICE — DRAINBAG,ANTI-REFLUX TOWER,L/F,2000ML,LL: Brand: MEDLINE

## (undated) DEVICE — SUT VIC 0 UR6 27IN VCP603H

## (undated) DEVICE — NDL HYPO ECLPS SFTY 18G 1 1/2IN

## (undated) DEVICE — ENCORE® LATEX MICRO SIZE 8, STERILE LATEX POWDER-FREE SURGICAL GLOVE: Brand: ENCORE

## (undated) DEVICE — GLV SURG BIOGEL LTX PF 7 1/2

## (undated) DEVICE — SAFESECURE,SECUREMENT,FOLEY CATH,STERILE: Brand: MEDLINE

## (undated) DEVICE — APPL CHLORAPREP W/TINT 26ML ORNG

## (undated) DEVICE — PK BASIC 70

## (undated) DEVICE — KT CVR ULTRASND PROB PULL UP LXF 5X8

## (undated) DEVICE — BLAKE SILICONE DRAIN, 19 FR ROUND, HUBLESS WITH 1/4" BENDABLE TROCAR: Brand: BLAKE

## (undated) DEVICE — SPHINCTEROTOME: Brand: HYDRATOME RX 44

## (undated) DEVICE — SUT SILK 2/0 SH 30IN K833H

## (undated) DEVICE — SUT SILK 3/0 SH CR8 30IN C017D

## (undated) DEVICE — SYRINGE 10CC LUER LOCK: Brand: CARDINAL HEALTH

## (undated) DEVICE — GLV SURG SENSICARE MICRO PF LF 7 STRL

## (undated) DEVICE — ENCORE® LATEX MICRO SIZE 7.5, STERILE LATEX POWDER-FREE SURGICAL GLOVE: Brand: ENCORE

## (undated) DEVICE — SUT MNCRYL PLS ANTIB UD 4/0 PS2 18IN

## (undated) DEVICE — GW JAG STR .025IN 450CM

## (undated) DEVICE — SOL LR 1000ML

## (undated) DEVICE — SINGLE PORT MANIFOLD: Brand: NEPTUNE 2

## (undated) DEVICE — LARYNG GLIDESCOPE LOPRO S4 TI SPECTR 1/PU

## (undated) DEVICE — Device

## (undated) DEVICE — TUBING, SUCTION, 1/4" X 20', STRAIGHT: Brand: MEDLINE INDUSTRIES, INC.

## (undated) DEVICE — GLV SURG SENSICARE MICRO PF LF 8 STRL

## (undated) DEVICE — NDL BIOP ECHOTIP ULTRA ULTRASND 22G

## (undated) DEVICE — DISH PETRI 3.5IN MD STRL LF

## (undated) DEVICE — MARYLAND JAW OPEN SEALER/DIVIDER COATED 23CM: Brand: LIGASURE

## (undated) DEVICE — SUT PROLN 3/0 8832H

## (undated) DEVICE — SUT SILK 2/0 TIES 18IN A185H

## (undated) DEVICE — 4-PORT MANIFOLD: Brand: NEPTUNE 2

## (undated) DEVICE — DRAPE,T,LAPARO,TRANS,STERILE: Brand: MEDLINE

## (undated) DEVICE — TRIPLE LUMEN ERCP CANNULA: Brand: TANDEM XL

## (undated) DEVICE — SUT SILK 3/0 TIES 18IN A184H

## (undated) DEVICE — NDL HYPO ECLPS SFTY 22G 1 1/2IN

## (undated) DEVICE — SUT PROLN 5/0 RB1 D/A 36IN 8556H

## (undated) DEVICE — SUT VIC 0/0 UR6 27IN DYED J603H

## (undated) DEVICE — INTENDED TO BE USED TO OCCLUDE, RETRACT AND IDENTIFY ARTERIES, VEINS, TENDONS AND NERVES IN SURGICAL PROCEDURES: Brand: STERION®  VESSEL LOOP

## (undated) DEVICE — SUT VIC 3/0 SH 27IN J416H

## (undated) DEVICE — ADAPT ST INFUS ADMIN SYR 70IN

## (undated) DEVICE — SUT MAXON PDS RB 1  DBL ARM 5 TO 0 30IN

## (undated) DEVICE — SYR LUERLOK 30CC

## (undated) DEVICE — ENDOPATH XCEL BLADELESS TROCARS WITH STABILITY SLEEVES: Brand: ENDOPATH XCEL

## (undated) DEVICE — SUT MNCRYL 4/0 PS2 18 IN

## (undated) DEVICE — INTENDED FOR TISSUE SEPARATION, AND OTHER PROCEDURES THAT REQUIRE A SHARP SURGICAL BLADE TO PUNCTURE OR CUT.: Brand: BARD-PARKER ® STAINLESS STEEL BLADES

## (undated) DEVICE — DBD-DRAPE,LAP,CHOLE,W/TROUGHS,STERILE: Brand: MEDLINE

## (undated) DEVICE — 2, DISPOSABLE SUCTION/IRRIGATOR WITH DISPOSABLE TIP: Brand: STRYKEFLOW

## (undated) DEVICE — CLTH CLENS READYCLEANSE PERI CARE PK/5

## (undated) DEVICE — INFLATION DEVICE: Brand: ENCORE 26

## (undated) DEVICE — MEDI-VAC YANKAUER SUCTION HANDLE W/BULBOUS TIP: Brand: CARDINAL HEALTH

## (undated) DEVICE — AIRWY SZ11

## (undated) DEVICE — TB FEED TRANSGASTRIC

## (undated) DEVICE — Device: Brand: DEFENDO AIR/WATER/SUCTION AND BIOPSY VALVE

## (undated) DEVICE — ENDOPATH XCEL UNIVERSAL TROCAR STABLILITY SLEEVES: Brand: ENDOPATH XCEL

## (undated) DEVICE — LEX BASIC NO DRAPE: Brand: MEDLINE INDUSTRIES, INC.

## (undated) DEVICE — SUT PROLN 4/0 RB1 D/A 36IN 8557H

## (undated) DEVICE — AIRWY 90MM NO9

## (undated) DEVICE — THE BITE BLOCK MAXI, LATEX FREE STRAP IS USED TO PROTECT THE ENDOSCOPE INSERTION TUBE FROM BEING BITTEN BY THE PATIENT.

## (undated) DEVICE — PDS II VLT 0 107CM AG ST3: Brand: ENDOLOOP

## (undated) DEVICE — WIREGUIDED CYTOLOGY BRUSH: Brand: RX CYTOLOGY BRUSH

## (undated) DEVICE — INTENDED FOR TISSUE SEPARATION, AND OTHER PROCEDURES THAT REQUIRE A SHARP SURGICAL BLADE TO PUNCTURE OR CUT.: Brand: BARD-PARKER ® CARBON RIB-BACK BLADES

## (undated) DEVICE — DRP C/ARM W/BAND W/CLIPS 41X74IN

## (undated) DEVICE — POOLE SUCTION INSTRUMENT WITH REMOVABLE SHEATH: Brand: POOLE

## (undated) DEVICE — SPHINCTEROTOME: Brand: DREAMTOME™ RX 44

## (undated) DEVICE — SPNG LAP PREWSH SFTPK 18X18IN STRL PK/5

## (undated) DEVICE — TP UMB COTN 30IN U11T

## (undated) DEVICE — SUT SILK 3/0 SH CR8 18IN C013D

## (undated) DEVICE — ST EXT IV SMARTSITE 2PC M LL 2.8ML 20IN

## (undated) DEVICE — JACKSON-PRATT 100CC BULB RESERVOIR: Brand: CARDINAL HEALTH

## (undated) DEVICE — POWERLOC 19G X 1 INCH WITH Y-SITE: Brand: PORT ACCESS NEEDLE

## (undated) DEVICE — CONN IV MAXPLUS NDLESS ACC

## (undated) DEVICE — Device: Brand: MEDEX

## (undated) DEVICE — DRSNG SURESITE WNDW 4X4.5